# Patient Record
Sex: FEMALE | Race: WHITE | Employment: UNEMPLOYED | URBAN - METROPOLITAN AREA
[De-identification: names, ages, dates, MRNs, and addresses within clinical notes are randomized per-mention and may not be internally consistent; named-entity substitution may affect disease eponyms.]

---

## 2017-11-15 ENCOUNTER — HOSPITAL ENCOUNTER (OUTPATIENT)
Dept: GENERAL RADIOLOGY | Age: 57
Discharge: HOME | End: 2017-11-15
Payer: SUBSIDIZED

## 2017-11-15 DIAGNOSIS — M25.569 PAIN IN KNEE: ICD-10-CM

## 2017-11-15 DIAGNOSIS — M25.561 KNEE PAIN, RIGHT: ICD-10-CM

## 2017-11-15 PROCEDURE — 73562 X-RAY EXAM OF KNEE 3: CPT

## 2018-07-16 ENCOUNTER — HOSPITAL ENCOUNTER (OUTPATIENT)
Dept: LAB | Age: 58
Discharge: HOME | End: 2018-07-16

## 2018-07-16 PROCEDURE — 80053 COMPREHEN METABOLIC PANEL: CPT | Performed by: NURSE PRACTITIONER

## 2018-07-16 PROCEDURE — 80061 LIPID PANEL: CPT | Performed by: NURSE PRACTITIONER

## 2018-07-16 PROCEDURE — 85025 COMPLETE CBC W/AUTO DIFF WBC: CPT | Performed by: NURSE PRACTITIONER

## 2018-07-16 PROCEDURE — 83036 HEMOGLOBIN GLYCOSYLATED A1C: CPT | Performed by: NURSE PRACTITIONER

## 2018-07-17 LAB
ALBUMIN SERPL-MCNC: 3.3 G/DL (ref 3.5–5)
ALBUMIN/GLOB SERPL: 0.8 {RATIO} (ref 1.1–2.2)
ALP SERPL-CCNC: 82 U/L (ref 45–117)
ALT SERPL-CCNC: 38 U/L (ref 12–78)
ANION GAP SERPL CALC-SCNC: 10 MMOL/L (ref 5–15)
AST SERPL-CCNC: 60 U/L (ref 15–37)
BASOPHILS # BLD: 0 K/UL (ref 0–0.1)
BASOPHILS NFR BLD: 0 % (ref 0–1)
BILIRUB SERPL-MCNC: 0.6 MG/DL (ref 0.2–1)
BUN SERPL-MCNC: 15 MG/DL (ref 6–20)
BUN/CREAT SERPL: 15 (ref 12–20)
CALCIUM SERPL-MCNC: 9.2 MG/DL (ref 8.5–10.1)
CHLORIDE SERPL-SCNC: 103 MMOL/L (ref 97–108)
CHOLEST SERPL-MCNC: 186 MG/DL
CO2 SERPL-SCNC: 29 MMOL/L (ref 21–32)
CREAT SERPL-MCNC: 0.99 MG/DL (ref 0.55–1.02)
DIFFERENTIAL METHOD BLD: ABNORMAL
EOSINOPHIL # BLD: 0.2 K/UL (ref 0–0.4)
EOSINOPHIL NFR BLD: 3 % (ref 0–7)
ERYTHROCYTE [DISTWIDTH] IN BLOOD BY AUTOMATED COUNT: 13.5 % (ref 11.5–14.5)
EST. AVERAGE GLUCOSE BLD GHB EST-MCNC: 128 MG/DL
GLOBULIN SER CALC-MCNC: 4.1 G/DL (ref 2–4)
GLUCOSE SERPL-MCNC: 101 MG/DL (ref 65–100)
HBA1C MFR BLD: 6.1 % (ref 4.2–6.3)
HCT VFR BLD AUTO: 50.3 % (ref 35–47)
HDLC SERPL-MCNC: 37 MG/DL
HDLC SERPL: 5 {RATIO} (ref 0–5)
HGB BLD-MCNC: 15.3 G/DL (ref 11.5–16)
IMM GRANULOCYTES # BLD: 0 K/UL (ref 0–0.04)
IMM GRANULOCYTES NFR BLD AUTO: 0 % (ref 0–0.5)
LDLC SERPL CALC-MCNC: 100.4 MG/DL (ref 0–100)
LIPID PROFILE,FLP: ABNORMAL
LYMPHOCYTES # BLD: 1.7 K/UL (ref 0.8–3.5)
LYMPHOCYTES NFR BLD: 21 % (ref 12–49)
MCH RBC QN AUTO: 31.4 PG (ref 26–34)
MCHC RBC AUTO-ENTMCNC: 30.4 G/DL (ref 30–36.5)
MCV RBC AUTO: 103.1 FL (ref 80–99)
MONOCYTES # BLD: 0.4 K/UL (ref 0–1)
MONOCYTES NFR BLD: 6 % (ref 5–13)
NEUTS SEG # BLD: 5.6 K/UL (ref 1.8–8)
NEUTS SEG NFR BLD: 71 % (ref 32–75)
NRBC # BLD: 0 K/UL (ref 0–0.01)
NRBC BLD-RTO: 0 PER 100 WBC
PLATELET # BLD AUTO: 178 K/UL (ref 150–400)
POTASSIUM SERPL-SCNC: 5.1 MMOL/L (ref 3.5–5.1)
PROT SERPL-MCNC: 7.4 G/DL (ref 6.4–8.2)
RBC # BLD AUTO: 4.88 M/UL (ref 3.8–5.2)
SODIUM SERPL-SCNC: 142 MMOL/L (ref 136–145)
TRIGL SERPL-MCNC: 243 MG/DL (ref ?–150)
VLDLC SERPL CALC-MCNC: 48.6 MG/DL
WBC # BLD AUTO: 8 K/UL (ref 3.6–11)

## 2018-07-31 ENCOUNTER — HOSPITAL ENCOUNTER (OUTPATIENT)
Dept: LAB | Age: 58
Discharge: HOME | End: 2018-07-31

## 2018-07-31 PROCEDURE — 82607 VITAMIN B-12: CPT | Performed by: NURSE PRACTITIONER

## 2018-08-01 LAB
FOLATE SERPL-MCNC: 14.1 NG/ML (ref 5–21)
VIT B12 SERPL-MCNC: 294 PG/ML (ref 193–986)

## 2019-01-30 ENCOUNTER — HOSPITAL ENCOUNTER (EMERGENCY)
Age: 59
Discharge: HOME | End: 2019-01-30
Attending: EMERGENCY MEDICINE | Admitting: EMERGENCY MEDICINE
Payer: MEDICARE

## 2019-01-30 ENCOUNTER — APPOINTMENT (OUTPATIENT)
Dept: GENERAL RADIOLOGY | Age: 59
End: 2019-01-30
Attending: PHYSICIAN ASSISTANT
Payer: MEDICARE

## 2019-01-30 VITALS
WEIGHT: 293 LBS | RESPIRATION RATE: 18 BRPM | HEART RATE: 82 BPM | TEMPERATURE: 99.1 F | BODY MASS INDEX: 50.02 KG/M2 | OXYGEN SATURATION: 95 % | SYSTOLIC BLOOD PRESSURE: 155 MMHG | DIASTOLIC BLOOD PRESSURE: 82 MMHG | HEIGHT: 64 IN

## 2019-01-30 DIAGNOSIS — R03.0 ELEVATED BLOOD PRESSURE READING: ICD-10-CM

## 2019-01-30 DIAGNOSIS — S80.02XA CONTUSION OF LEFT KNEE, INITIAL ENCOUNTER: ICD-10-CM

## 2019-01-30 DIAGNOSIS — S80.212A ABRASION OF KNEE, BILATERAL: Primary | ICD-10-CM

## 2019-01-30 DIAGNOSIS — S80.211A ABRASION OF KNEE, BILATERAL: Primary | ICD-10-CM

## 2019-01-30 DIAGNOSIS — S80.01XA CONTUSION OF RIGHT KNEE, INITIAL ENCOUNTER: ICD-10-CM
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
## 2019-01-30 PROCEDURE — 73562 X-RAY EXAM OF KNEE 3: CPT

## 2019-01-30 PROCEDURE — 99284 EMERGENCY DEPT VISIT MOD MDM: CPT

## 2019-01-30 PROCEDURE — 74011250637 HC RX REV CODE- 250/637: Performed by: PHYSICIAN ASSISTANT

## 2019-01-30 RX ORDER — GUAIFENESIN 100 MG/5ML
81 LIQUID (ML) ORAL DAILY
Status: ON HOLD | COMMUNITY
End: 2020-07-27

## 2019-01-30 RX ORDER — NAPROXEN 250 MG/1
500 TABLET ORAL
Status: COMPLETED | OUTPATIENT
Start: 2019-01-30 | End: 2019-01-30

## 2019-01-30 RX ORDER — ATORVASTATIN CALCIUM 10 MG/1
10 TABLET, FILM COATED ORAL
Status: ON HOLD | COMMUNITY
End: 2022-05-17 | Stop reason: SDUPTHER

## 2019-01-30 RX ORDER — NAPROXEN 500 MG/1
500 TABLET ORAL
Qty: 20 TAB | Refills: 0 | Status: SHIPPED | OUTPATIENT
Start: 2019-01-30 | End: 2019-12-27

## 2019-01-30 RX ADMIN — NAPROXEN 500 MG: 250 TABLET ORAL at 09:45

## 2019-01-30 NOTE — DISCHARGE INSTRUCTIONS
Patient Education        Scrapes (Abrasions): Care Instructions  Your Care Instructions  Scrapes (abrasions) are wounds where your skin has been rubbed or torn off. Most scrapes do not go deep into the skin, but some may remove several layers of skin. Scrapes usually don't bleed much, but they may ooze pinkish fluid. Scrapes on the head or face may appear worse than they are. They may bleed a lot because of the good blood supply to this area. Most scrapes heal well and may not need a bandage. They usually heal within 3 to 7 days. A large, deep scrape may take 1 to 2 weeks or longer to heal. A scab may form on some scrapes. Follow-up care is a key part of your treatment and safety. Be sure to make and go to all appointments, and call your doctor if you are having problems. It's also a good idea to know your test results and keep a list of the medicines you take. How can you care for yourself at home? · If your doctor told you how to care for your wound, follow your doctor's instructions. If you did not get instructions, follow this general advice:  ? Wash the scrape with clean water 2 times a day. Don't use hydrogen peroxide or alcohol, which can slow healing. ? You may cover the scrape with a thin layer of petroleum jelly, such as Vaseline, and a nonstick bandage. ? Apply more petroleum jelly and replace the bandage as needed. · Prop up the injured area on a pillow anytime you sit or lie down during the next 3 days. Try to keep it above the level of your heart. This will help reduce swelling. · Be safe with medicines. Take pain medicines exactly as directed. ? If the doctor gave you a prescription medicine for pain, take it as prescribed. ? If you are not taking a prescription pain medicine, ask your doctor if you can take an over-the-counter medicine. When should you call for help?   Call your doctor now or seek immediate medical care if:    · You have signs of infection, such as:  ? Increased pain, swelling, warmth, or redness around the scrape. ? Red streaks leading from the scrape. ? Pus draining from the scrape. ? A fever.     · The scrape starts to bleed, and blood soaks through the bandage. Oozing small amounts of blood is normal.    Watch closely for changes in your health, and be sure to contact your doctor if the scrape is not getting better each day. Where can you learn more? Go to http://trina-jordan.info/. Enter A374 in the search box to learn more about \"Scrapes (Abrasions): Care Instructions. \"  Current as of: September 23, 2018  Content Version: 11.9  © 9944-2286 [employer]. Care instructions adapted under license by [employer] (which disclaims liability or warranty for this information). If you have questions about a medical condition or this instruction, always ask your healthcare professional. [place] [address] any warranty or liability for your use of this information. Patient Education        Bruises: Care Instructions  Your Care Instructions    Bruises occur when small blood vessels under the skin tear or rupture, most often from a twist, bump, or fall. Blood leaks into tissues under the skin and causes a black-and-blue spot that often turns colors, including purplish black, reddish blue, or yellowish green, as the bruise heals. Bruises hurt, but most are not serious and will go away on their own within 2 to 4 weeks. Sometimes, gravity causes them to spread down the body. A leg bruise usually will take longer to heal than a bruise on the face or arms. Follow-up care is a key part of your treatment and safety. Be sure to make and go to all appointments, and call your doctor if you are having problems. It's also a good idea to know your test results and keep a list of the medicines you take. How can you care for yourself at home? · Take pain medicines exactly as directed.   ? If the doctor gave you a prescription medicine for pain, take it as prescribed. ? If you are not taking a prescription pain medicine, ask your doctor if you can take an over-the-counter medicine. · Put ice or a cold pack on the area for 10 to 20 minutes at a time. Put a thin cloth between the ice and your skin. · If you can, prop up the bruised area on pillows as much as possible for the next few days. Try to keep the bruise above the level of your heart. When should you call for help? Call your doctor now or seek immediate medical care if:    · You have signs of infection, such as:  ? Increased pain, swelling, warmth, or redness. ? Red streaks leading from the bruise. ? Pus draining from the bruise. ? A fever.     · You have a bruise on your leg and signs of a blood clot, such as:  ? Increasing redness and swelling along with warmth, tenderness, and pain in the bruised area. ? Pain in your calf, back of the knee, thigh, or groin. ? Redness and swelling in your leg or groin.     · Your pain gets worse.    Watch closely for changes in your health, and be sure to contact your doctor if:    · You do not get better as expected. Where can you learn more? Go to http://trina-jordan.info/. Enter [phone] in the search box to learn more about \"Bruises: Care Instructions. \"  Current as of: September 23, 2018  Content Version: 11.9  © 2848-6009 [employer]. Care instructions adapted under license by [employer] (which disclaims liability or warranty for this information). If you have questions about a medical condition or this instruction, always ask your healthcare professional. [place] [address] any warranty or liability for your use of this information. Patient Education        Knee Pain or Injury: Care Instructions  Your Care Instructions    Injuries are a common cause of knee problems. Sudden (acute) injuries may be caused by a direct blow to the knee.  They can also be caused by abnormal twisting, bending, or falling on the knee. Pain, bruising, or swelling may be severe, and may start within minutes of the injury. Overuse is another cause of knee pain. Other causes are climbing stairs, kneeling, and other activities that use the knee. Everyday wear and tear, especially as you get older, also can cause knee pain. Rest, along with home treatment, often relieves pain and allows your knee to heal. If you have a serious knee injury, you may need tests and treatment. Follow-up care is a key part of your treatment and safety. Be sure to make and go to all appointments, and call your doctor if you are having problems. It's also a good idea to know your test results and keep a list of the medicines you take. How can you care for yourself at home? · Be safe with medicines. Read and follow all instructions on the label. ? If the doctor gave you a prescription medicine for pain, take it as prescribed. ? If you are not taking a prescription pain medicine, ask your doctor if you can take an over-the-counter medicine. · Rest and protect your knee. Take a break from any activity that may cause pain. · Put ice or a cold pack on your knee for 10 to 20 minutes at a time. Put a thin cloth between the ice and your skin. · Prop up a sore knee on a pillow when you ice it or anytime you sit or lie down for the next 3 days. Try to keep it above the level of your heart. This will help reduce swelling. · If your knee is not swollen, you can put moist heat, a heating pad, or a warm cloth on your knee. · If your doctor recommends an elastic bandage, sleeve, or other type of support for your knee, wear it as directed. · Follow your doctor's instructions about how much weight you can put on your leg. Use a cane, crutches, or a walker as instructed. · Follow your doctor's instructions about activity during your healing process. If you can do mild exercise, slowly increase your activity.   · Reach and stay at a healthy weight. Extra weight can strain the joints, especially the knees and hips, and make the pain worse. Losing even a few pounds may help. When should you call for help? Call 911 anytime you think you may need emergency care. For example, call if:    · You have symptoms of a blood clot in your lung (called a pulmonary embolism). These may include:  ? Sudden chest pain. ? Trouble breathing. ? Coughing up blood.    Call your doctor now or seek immediate medical care if:    · You have severe or increasing pain.     · Your leg or foot turns cold or changes color.     · You cannot stand or put weight on your knee.     · Your knee looks twisted or bent out of shape.     · You cannot move your knee.     · You have signs of infection, such as:  ? Increased pain, swelling, warmth, or redness. ? Red streaks leading from the knee. ? Pus draining from a place on your knee. ? A fever.     · You have signs of a blood clot in your leg (called a deep vein thrombosis), such as:  ? Pain in your calf, back of the knee, thigh, or groin. ? Redness and swelling in your leg or groin.    Watch closely for changes in your health, and be sure to contact your doctor if:    · You have tingling, weakness, or numbness in your knee.     · You have any new symptoms, such as swelling.     · You have bruises from a knee injury that last longer than 2 weeks.     · You do not get better as expected. Where can you learn more? Go to http://trina-jordan.info/. Enter K195 in the search box to learn more about \"Knee Pain or Injury: Care Instructions. \"  Current as of: September 23, 2018  Content Version: 11.9  © 6933-1483 [employer]. Care instructions adapted under license by [employer] (which disclaims liability or warranty for this information).  If you have questions about a medical condition or this instruction, always ask your healthcare professional. [place] any warranty or liability for your use of this information. Patient Education        Elevated Blood Pressure: Care Instructions  Your Care Instructions    Blood pressure is a measure of how hard the blood pushes against the walls of your arteries. It's normal for blood pressure to go up and down throughout the day. But if it stays up over time, you have high blood pressure. Two numbers tell you your blood pressure. The first number is the systolic pressure. It shows how hard the blood pushes when your heart is pumping. The second number is the diastolic pressure. It shows how hard the blood pushes between heartbeats, when your heart is relaxed and filling with blood. An ideal blood pressure in adults is less than 120/80 (say \"120 over 80\"). High blood pressure is 140/90 or higher. You have high blood pressure if your top number is 140 or higher or your bottom number is 90 or higher, or both. The main test for high blood pressure is simple, fast, and painless. To diagnose high blood pressure, your doctor will test your blood pressure at different times. After testing your blood pressure, your doctor may ask you to test it again when you are home. If you are diagnosed with high blood pressure, you can work with your doctor to make a long-term plan to manage it. Follow-up care is a key part of your treatment and safety. Be sure to make and go to all appointments, and call your doctor if you are having problems. It's also a good idea to know your test results and keep a list of the medicines you take. How can you care for yourself at home? · Do not smoke. Smoking increases your risk for heart attack and stroke. If you need help quitting, talk to your doctor about stop-smoking programs and medicines. These can increase your chances of quitting for good. · Stay at a healthy weight. · Try to limit how much sodium you eat to less than 2,300 milligrams (mg) a day.  Your doctor may ask you to try to eat less than 1,500 mg a day. · Be physically active. Get at least 30 minutes of exercise on most days of the week. Walking is a good choice. You also may want to do other activities, such as running, swimming, cycling, or playing tennis or team sports. · Avoid or limit alcohol. Talk to your doctor about whether you can drink any alcohol. · Eat plenty of fruits, vegetables, and low-fat dairy products. Eat less saturated and total fats. · Learn how to check your blood pressure at home. When should you call for help? Call your doctor now or seek immediate medical care if:  ? · Your blood pressure is much higher than normal (such as 180/110 or higher). ? · You think high blood pressure is causing symptoms such as:  ¨ Severe headache. ¨ Blurry vision. ? Watch closely for changes in your health, and be sure to contact your doctor if:  ? · You do not get better as expected. Where can you learn more? Go to http://trina-jordan.info/. Enter G255 in the search box to learn more about \"Elevated Blood Pressure: Care Instructions. \"  Current as of: September 21, 2016  Content Version: 11.4  © 6004-9907 [employer]. Care instructions adapted under license by [employer] (which disclaims liability or warranty for this information). If you have questions about a medical condition or this instruction, always ask your healthcare professional. [address] any warranty or liability for your use of this information.

## 2019-01-30 NOTE — ED PROVIDER NOTES
EMERGENCY DEPARTMENT HISTORY AND PHYSICAL EXAM 
 
 
Date: 1/30/2019 Patient Name: Ishan Lizarraga History of Presenting Illness Chief Complaint Patient presents with  Knee Pain  
  pt arrives by EMS with reports of bilateral knee pain, left worse than right, reports she fell this morning, reports she fell when toilet seat broke  Fall History Provided By: Patient HPI: Ishan Lizarraga, 60 y.o. female with PMHx significant for DM, HTN, high cholesterol, presents by EMS to the ED with cc of new onset moderate bilateral knee pain status post falling when a a toilet seat broke. The injury occurred this morning just prior to arrival in the ED. She notes having chronic L knee pain and that she is scheduled for an orthopedics appointment at [facility] tomorrow. Pt states that her pain is exacerbated with movement and palpation. She denies any head trauma or LOC during incident. Pt specifically denies any HA, SOB, CP, abdominal pain, nausea, vomiting, fevers, chills, cough, urinary sxs or diarrhea. There was no treatment PTA to the ED. There are no other complaints, changes, or physical findings at this time. Social Hx: Tobacco (-), EtOH (-), Illicit drug use (-) PCP: Mily Cadena NP Ortho: [address] No current facility-administered medications on file prior to encounter. Current Outpatient Medications on File Prior to Encounter Medication Sig Dispense Refill  aspirin 81 mg chewable tablet Take 81 mg by mouth daily.  atorvastatin (LIPITOR) 10 mg tablet Take  by mouth daily.  furosemide (LASIX) 20 mg tablet Take 20 mg by mouth daily.  metoprolol (LOPRESSOR) 25 mg tablet Take 25 mg by mouth two (2) times a day. Past History Past Medical History: 
Past Medical History:  
Diagnosis Date  Diabetes ([facility] [address]  Hypertension  Ill-defined condition   
 high cholesterol  Ill-defined condition   
 tachycardia  Other and unspecified symptoms and signs involving general sensations and perceptions   
 ruptured disc Past Surgical History: 
History reviewed. No pertinent surgical history. Family History: 
History reviewed. No pertinent family history. Social History: 
Social History Tobacco Use  Smoking status: Former Smoker Substance Use Topics  Alcohol use: No  
 Drug use: No  
 
 
Allergies: Allergies Allergen Reactions  Other Plant, Animal, Environmental Rash Ivory soap caused rash on hands. Review of Systems Review of Systems Constitutional: Negative for chills, diaphoresis and fever. HENT: Negative for congestion, ear pain, rhinorrhea and sore throat. Respiratory: Negative for cough and shortness of breath. Cardiovascular: Negative for chest pain. Gastrointestinal: Negative for abdominal pain, constipation, diarrhea, nausea and vomiting. Genitourinary: Negative for difficulty urinating, dysuria, frequency and hematuria. Musculoskeletal: Positive for arthralgias (BL knee). Negative for myalgias. Neurological: Negative for headaches. All other systems reviewed and are negative. Physical Exam  
Physical Exam  
Constitutional: She is oriented to person, place, and time. She appears well-developed and well-nourished. No distress. Obese 60 y.o.  female HENT:  
Head: Normocephalic and atraumatic. Eyes: Conjunctivae are normal. Right eye exhibits no discharge. Left eye exhibits no discharge. Neck: Normal range of motion. Neck supple. Cardiovascular: Normal rate, regular rhythm, normal heart sounds and intact distal pulses. No murmur heard. Pulmonary/Chest: Effort normal and breath sounds normal. No respiratory distress. Musculoskeletal:  
Bilateral knees with superficial abrasions present. Mild, diffuse tenderness to palpation of bilateral knees without point tenderness.  FROM without crepitus or laxity. Distal NV intact. Cap refill < 2 sec. Neurological: She is alert and oriented to person, place, and time. Skin: Skin is warm and dry. She is not diaphoretic. Psychiatric: She has a normal mood and affect. Her behavior is normal.  
Nursing note and vitals reviewed. Diagnostic Study Results Labs - None Radiologic Studies -  
XR KNEE RT 3 V Final Result IMPRESSION: Small effusion. Mild degenerative changes. No acute osseous  
abnormality. XR KNEE LT 3 V Final Result IMPRESSION: Small joint effusion. Significant degenerative changes. No acute  
osseous abnormality. Medical Decision Making I am the first provider for this patient. I reviewed the vital signs, available nursing notes, past medical history, past surgical history, family history and social history. Vital Signs-Reviewed the patient's vital signs. Patient Vitals for the past 12 hrs: 
 Temp Pulse Resp BP SpO2  
01/30/19 0913 99.1 °F (37.3 °C) 85 18 (!) 151/120 95 % Records Reviewed: Nursing Notes Provider Notes (Medical Decision Making): DDx: fracture, sprain, strain, contusion, abrasion, DJD 
 
ED Course:  
Initial assessment performed. The patients presenting problems have been discussed, and they are in agreement with the care plan formulated and outlined with them. I have encouraged them to ask questions as they arise throughout their visit. 10:27 AM 
Discussed with patient at length the need for blood pressure re-evaluation and management with primary care. Discussed the long term sequelae for elevated blood pressure including blindness, CVA, MI, and renal failure/ dialysis. Pt agrees to follow up as directed. [place], [place] Critical Care Time: 0 Disposition: 
DISCHARGE NOTE: 
10:28 AM 
The pt is ready for discharge.  The pt's signs, symptoms, diagnosis, and discharge instructions have been discussed and pt has conveyed their understanding. The pt is to follow up as recommended or return to ER should their symptoms worsen. Plan has been discussed and pt is in agreement. PLAN: 
1. Current Discharge Medication List  
  
START taking these medications Details  
naproxen (NAPROSYN) 500 mg tablet Take 1 Tab by mouth every twelve (12) hours as needed for Pain. Qty: 20 Tab, Refills: 0 CONTINUE these medications which have NOT CHANGED Details  
aspirin 81 mg chewable tablet Take 81 mg by mouth daily. atorvastatin (LIPITOR) 10 mg tablet Take  by mouth daily. furosemide (LASIX) 20 mg tablet Take 20 mg by mouth daily. metoprolol (LOPRESSOR) 25 mg tablet Take 25 mg by mouth two (2) times a day. 2.  
Follow-up Information Follow up With Specialties Details Why Contact Info Acacia Guillory NP Nurse Practitioner  As needed [address] [place] [facility] 
[phone] [address]  In 1 day as scheduled tomorrow Jagdish Avalos [address] [id number] [phone] Return to ED if worse Diagnosis Clinical Impression: 1. Abrasion of knee, bilateral   
2. Contusion of left knee, initial encounter 3. Contusion of right knee, initial encounter 4. Fall, initial encounter 5. Elevated blood pressure reading 8:06 AM 
I was personally available for consultation in the emergency department. I have reviewed the chart and agree with the documentation recorded by the [facility], including the assessment, treatment plan, and disposition. Caio Amos MD 
 
 
Attestations: This note is prepared by Yaya Urias, acting as a Scribe for Damon Payne PA-C. Damon Payne PA-C: The scribe's documentation has been prepared under my direction and personally reviewed by me in its entirety. I confirm that the notes above accurately reflects all work, treatment, procedures, and medical decision making performed by me. This note will not be viewable in [address]

## 2019-12-27 ENCOUNTER — HOSPITAL ENCOUNTER (EMERGENCY)
Age: 59
Discharge: HOME | End: 2019-12-27
Attending: EMERGENCY MEDICINE
Payer: MEDICARE

## 2019-12-27 VITALS
SYSTOLIC BLOOD PRESSURE: 125 MMHG | WEIGHT: 293 LBS | OXYGEN SATURATION: 99 % | HEIGHT: 63 IN | TEMPERATURE: 97.8 F | RESPIRATION RATE: 18 BRPM | HEART RATE: 90 BPM | BODY MASS INDEX: 51.91 KG/M2 | DIASTOLIC BLOOD PRESSURE: 60 MMHG

## 2019-12-27 DIAGNOSIS — R04.0 EPISTAXIS: Primary | ICD-10-CM

## 2019-12-27 PROCEDURE — 75810000284 HC CNTRL NASAL HEMORHRAGE SIMPLE

## 2019-12-27 PROCEDURE — 99284 EMERGENCY DEPT VISIT MOD MDM: CPT

## 2019-12-27 RX ORDER — LIDOCAINE HYDROCHLORIDE 20 MG/ML
10 SOLUTION OROPHARYNGEAL
Status: DISCONTINUED | OUTPATIENT
Start: 2019-12-27 | End: 2019-12-27

## 2019-12-27 RX ORDER — OXYMETAZOLINE HCL 0.05 %
2 SPRAY, NON-AEROSOL (ML) NASAL
Status: DISCONTINUED | OUTPATIENT
Start: 2019-12-27 | End: 2019-12-27

## 2019-12-28 NOTE — DISCHARGE INSTRUCTIONS
Patient Education        Nosebleeds: Care Instructions  Your Care Instructions    Nosebleeds are common, especially if you have colds or allergies. Many things can cause a nosebleed. Some nosebleeds stop on their own with pressure. Others need packing. Some get cauterized (sealed). If you have gauze or other packing materials in your nose, you will need to follow up with your doctor to have the packing removed. You may need more treatment if you get nosebleeds a lot. The doctor has checked you carefully, but problems can develop later. If you notice any problems or new symptoms, get medical treatment right away. Follow-up care is a key part of your treatment and safety. Be sure to make and go to all appointments, and call your doctor if you are having problems. It's also a good idea to know your test results and keep a list of the medicines you take. How can you care for yourself at home? · If you get another nosebleed:  ? Sit up and tilt your head slightly forward. This keeps blood from going down your throat. ? Use your thumb and index finger to pinch your nose shut for 10 minutes. Use a clock. Do not check to see if the bleeding has stopped before the 10 minutes are up. If the bleeding has not stopped, pinch your nose shut for another 10 minutes. ? When the bleeding has stopped, try not to pick, rub, or blow your nose for 12 hours. Avoiding these things helps keep your nose from bleeding again. · If your doctor prescribed antibiotics, take them as directed. Do not stop taking them just because you feel better. You need to take the full course of antibiotics. To prevent nosebleeds  · Do not blow your nose too hard. · Try not to lift or strain after a nosebleed. · Raise your head on a pillow while you sleep. · Put a thin layer of a saline- or water-based nasal gel, such as NasoGel, inside your nose. Put it on the septum, which divides your nostrils.  This will prevent dryness that can cause nosebleeds. · Use a vaporizer or humidifier to add moisture to your bedroom. Follow the directions for cleaning the machine. · Do not use aspirin, ibuprofen (Advil, Motrin), or naproxen (Aleve) for 36 to 48 hours after a nosebleed unless your doctor tells you to. You can use acetaminophen (Tylenol) for pain relief. · Talk to your doctor about stopping any other medicines you are taking. Some medicines may make you more likely to get a nosebleed. · Do not use cold medicines or nasal sprays without first talking to your doctor. They can make your nose dry. When should you call for help? Call 911 anytime you think you may need emergency care. For example, call if:    · You passed out (lost consciousness).    Call your doctor now or seek immediate medical care if:    · You get another nosebleed and your nose is still bleeding after you have applied pressure 3 times for 10 minutes each time (30 minutes total).     · There is a lot of blood running down the back of your throat even after you pinch your nose and tilt your head forward.     · You have a fever.     · You have sinus pain.    Watch closely for changes in your health, and be sure to contact your doctor if:    · You get nosebleeds often, even if they stop.     · You do not get better as expected. Where can you learn more? Go to http://trina-jordan.info/. Enter S156 in the search box to learn more about \"Nosebleeds: Care Instructions. \"  Current as of: June 26, 2019  Content Version: 12.2  © 2124-8170 [employer]. Care instructions adapted under license by [employer] (which disclaims liability or warranty for this information). If you have questions about a medical condition or this instruction, always ask your healthcare professional. [address] any warranty or liability for your use of this information.

## 2019-12-28 NOTE — ED PROVIDER NOTES
EMERGENCY DEPARTMENT HISTORY AND PHYSICAL EXAM      Date: 12/27/2019  Patient Name: Marcus Patel    History of Presenting Illness     Chief Complaint   Patient presents with    Epistaxis     pt arrived with EMS, having nosebleed x 20 mins       History Provided By: Patient    HPI: Marcus Patel, 60 y.o. female presents by EMS to the ED with cc of a nose bleed that started bleeding this evening. Notes that it is her left nare that is bleeding. She denies recent nasal trauma. She notes she has had recurring nose bleeds over that past month. She denies pain. There are no other complaints, changes, or physical findings at this time. Social Hx: Tobacco (denies), EtOH (denies), Illicit drug use (denies)     PCP: José Antonio Mistry NP    No current facility-administered medications on file prior to encounter. Current Outpatient Medications on File Prior to Encounter   Medication Sig Dispense Refill    aspirin 81 mg chewable tablet Take 81 mg by mouth daily.  atorvastatin (LIPITOR) 10 mg tablet Take  by mouth daily.  metoprolol (LOPRESSOR) 25 mg tablet Take 25 mg by mouth two (2) times a day.  [DISCONTINUED] naproxen (NAPROSYN) 500 mg tablet Take 1 Tab by mouth every twelve (12) hours as needed for Pain. 20 Tab 0    furosemide (LASIX) 20 mg tablet Take 20 mg by mouth daily. Past History     Past Medical History:  Past Medical History:   Diagnosis Date    Diabetes ([address]     Hypertension     Ill-defined condition     high cholesterol    Ill-defined condition     tachycardia    Other and unspecified symptoms and signs involving general sensations and perceptions     ruptured disc       Past Surgical History:  No past surgical history on file. Family History:  No family history on file. Social History:  Social History     Tobacco Use    Smoking status: Former Smoker   Substance Use Topics    Alcohol use: No    Drug use: No       Allergies:   Allergies   Allergen Reactions    Other Plant, Animal, Environmental Rash     Ivory soap caused rash on hands. Review of Systems   Review of Systems   Constitutional: Negative for chills, diaphoresis and fever. HENT: Positive for nosebleeds. Negative for congestion, ear pain, rhinorrhea and sore throat. Respiratory: Negative for cough and shortness of breath. Cardiovascular: Negative for chest pain. Gastrointestinal: Negative for abdominal pain, constipation, diarrhea, nausea and vomiting. Genitourinary: Negative for difficulty urinating, dysuria, frequency and hematuria. Musculoskeletal: Negative for arthralgias and myalgias. Neurological: Negative for headaches. All other systems reviewed and are negative. Physical Exam   Physical Exam  Vitals signs and nursing note reviewed. Constitutional:       General: She is not in acute distress. Appearance: She is well-developed. She is obese. She is not diaphoretic. Comments: 60 y.o.  female with elevated BMI   HENT:      Head: Normocephalic and atraumatic. Eyes:      General:         Right eye: No discharge. Left eye: No discharge. Conjunctiva/sclera: Conjunctivae normal.   Neck:      Musculoskeletal: Normal range of motion and neck supple. Cardiovascular:      Rate and Rhythm: Normal rate and regular rhythm. Heart sounds: Normal heart sounds. No murmur. Pulmonary:      Effort: Pulmonary effort is normal. No respiratory distress. Breath sounds: Normal breath sounds. Skin:     General: Skin is warm and dry. Neurological:      Mental Status: She is alert and oriented to person, place, and time. Psychiatric:         Behavior: Behavior normal.         Diagnostic Study Results     Labs - none    Radiologic Studies - none    Medical Decision Making   I am the first provider for this patient.     I reviewed the vital signs, available nursing notes, past medical history, past surgical history, family history and social history. Vital Signs-Reviewed the patient's vital signs. Patient Vitals for the past 12 hrs:   Temp Pulse Resp BP SpO2   12/27/19 2129  90 18 125/60 99 %   12/27/19 2034 97.8 °F (36.6 °C) 84 20 141/76 96 %     Records Reviewed: Nursing Notes and Old Medical Records    Provider Notes (Medical Decision Making):   Recurring nosebleeds without other complaint, pain, injury, or medical symptom. Will pack nose and have patient follow-up with ENT or PCP. ED Course:   Initial assessment performed. The patients presenting problems have been discussed, and they are in agreement with the care plan formulated and outlined with them. I have encouraged them to ask questions as they arise throughout their visit. Procedure Note - Epistaxis Management:   9:05 PM  Performed by: COREEN Nguyen . Complexity: Simple  The patient underwent nasal pack placement with RhinoRocket in the left nare(s). Hemostasis was achieved after placement. Estimated blood loss: < 5 mL  The procedure took 1-15 minutes, and pt tolerated well.     9:29 PM  The patient has been re-evaluated. She is no longer bleeding. Will leave merocel in place and have patient follow up with PCP or ENT on Monday for removal.          Critical Care Time: None    Disposition:  DISCHARGE NOTE:  9:29 PM  The pt is ready for discharge. The pt's signs, symptoms, diagnosis, and discharge instructions have been discussed and pt has conveyed their understanding. The pt is to follow up as recommended or return to ER should their symptoms worsen. Plan has been discussed and pt is in agreement. PLAN:  1. Current Discharge Medication List        2.    Follow-up Information     Follow up With Specialties Details Why Contact Info    Michi Suarez NP Nurse Practitioner In 3 days  [address] [facility]      Priscilla Murray MD Otolaryngology In 3 days  [address]  [facility]  [phone] Return to ED if worse     Diagnosis     Clinical Impression:   1. Epistaxis          Please note that this dictation was completed with Yasuu, the computer voice recognition software. Quite often unanticipated grammatical, syntax, homophones, and other interpretive errors are inadvertently transcribed by the computer software. Please disregards these errors. Please excuse any errors that have escaped final proofreading. This note will not be viewable in [address]

## 2020-07-23 ENCOUNTER — HOSPITAL ENCOUNTER (INPATIENT)
Age: 60
LOS: 14 days | Discharge: NURSING, REHAB OR LONG-TERM CARE | DRG: 207 | End: 2020-08-07
Attending: EMERGENCY MEDICINE | Admitting: INTERNAL MEDICINE
Payer: MEDICARE

## 2020-07-23 DIAGNOSIS — J96.01 ACUTE RESPIRATORY FAILURE WITH HYPOXIA (HCC): Primary | ICD-10-CM

## 2020-07-23 DIAGNOSIS — I50.33 ACUTE ON CHRONIC DIASTOLIC CHF (CONGESTIVE HEART FAILURE) (HCC): ICD-10-CM

## 2020-07-23 DIAGNOSIS — Z20.822 SUSPECTED COVID-19 VIRUS INFECTION: ICD-10-CM

## 2020-07-23 DIAGNOSIS — R06.03 RESPIRATORY DISTRESS: ICD-10-CM

## 2020-07-23 DIAGNOSIS — J81.0 ACUTE PULMONARY EDEMA (HCC): ICD-10-CM

## 2020-07-23 DIAGNOSIS — J18.9 ATYPICAL PNEUMONIA: ICD-10-CM

## 2020-07-23 PROCEDURE — 93005 ELECTROCARDIOGRAM TRACING: CPT

## 2020-07-23 PROCEDURE — 99285 EMERGENCY DEPT VISIT HI MDM: CPT

## 2020-07-24 ENCOUNTER — APPOINTMENT (OUTPATIENT)
Dept: GENERAL RADIOLOGY | Age: 60
DRG: 207 | End: 2020-07-24
Attending: ANESTHESIOLOGY
Payer: MEDICARE

## 2020-07-24 ENCOUNTER — APPOINTMENT (OUTPATIENT)
Dept: GENERAL RADIOLOGY | Age: 60
DRG: 207 | End: 2020-07-24
Attending: EMERGENCY MEDICINE
Payer: MEDICARE

## 2020-07-24 PROBLEM — Z20.822 COVID-19 RULED OUT: Status: ACTIVE | Noted: 2020-07-24

## 2020-07-24 LAB
ALBUMIN SERPL-MCNC: 3.1 G/DL (ref 3.5–5)
ALBUMIN/GLOB SERPL: 0.6 {RATIO} (ref 1.1–2.2)
ALP SERPL-CCNC: 88 U/L (ref 45–117)
ALT SERPL-CCNC: 25 U/L (ref 12–78)
ANION GAP SERPL CALC-SCNC: 2 MMOL/L (ref 5–15)
ARTERIAL PATENCY WRIST A: YES
ARTERIAL PATENCY WRIST A: YES
AST SERPL-CCNC: 25 U/L (ref 15–37)
B PERT DNA SPEC QL NAA+PROBE: NOT DETECTED
BASOPHILS # BLD: 0 K/UL (ref 0–0.1)
BASOPHILS NFR BLD: 0 % (ref 0–1)
BDY SITE: ABNORMAL
BDY SITE: ABNORMAL
BILIRUB SERPL-MCNC: 0.7 MG/DL (ref 0.2–1)
BNP SERPL-MCNC: 2987 PG/ML
BORDETELLA PARAPERTUSSIS PCR, BORPAR: NOT DETECTED
BUN SERPL-MCNC: 25 MG/DL (ref 6–20)
BUN/CREAT SERPL: 25 (ref 12–20)
C PNEUM DNA SPEC QL NAA+PROBE: NOT DETECTED
CA-I BLD-SCNC: 1.26 MMOL/L (ref 1.12–1.32)
CA-I BLD-SCNC: 1.34 MMOL/L (ref 1.12–1.32)
CALCIUM SERPL-MCNC: 8.8 MG/DL (ref 8.5–10.1)
CHLORIDE SERPL-SCNC: 103 MMOL/L (ref 97–108)
CK SERPL-CCNC: 39 U/L (ref 26–192)
CO2 SERPL-SCNC: 36 MMOL/L (ref 21–32)
CREAT SERPL-MCNC: 0.99 MG/DL (ref 0.55–1.02)
D DIMER PPP FEU-MCNC: 1.58 MG/L FEU (ref 0–0.65)
DIFFERENTIAL METHOD BLD: ABNORMAL
EOSINOPHIL # BLD: 0.2 K/UL (ref 0–0.4)
EOSINOPHIL NFR BLD: 1 % (ref 0–7)
ERYTHROCYTE [DISTWIDTH] IN BLOOD BY AUTOMATED COUNT: 15.2 % (ref 11.5–14.5)
FERRITIN SERPL-MCNC: 44 NG/ML (ref 26–388)
FIBRINOGEN PPP-MCNC: 414 MG/DL (ref 200–475)
FLUAV H1 2009 PAND RNA SPEC QL NAA+PROBE: NOT DETECTED
FLUAV H1 RNA SPEC QL NAA+PROBE: NOT DETECTED
FLUAV H3 RNA SPEC QL NAA+PROBE: NOT DETECTED
FLUAV SUBTYP SPEC NAA+PROBE: NOT DETECTED
FLUBV RNA SPEC QL NAA+PROBE: NOT DETECTED
GAS FLOW.O2 O2 DELIVERY SYS: ABNORMAL L/MIN
GAS FLOW.O2 O2 DELIVERY SYS: ABNORMAL L/MIN
GAS FLOW.O2 SETTING OXYMISER: 15 L/M
GLOBULIN SER CALC-MCNC: 4.8 G/DL (ref 2–4)
GLUCOSE BLD STRIP.AUTO-MCNC: 144 MG/DL (ref 65–100)
GLUCOSE SERPL-MCNC: 145 MG/DL (ref 65–100)
HADV DNA SPEC QL NAA+PROBE: NOT DETECTED
HCOV 229E RNA SPEC QL NAA+PROBE: NOT DETECTED
HCOV HKU1 RNA SPEC QL NAA+PROBE: NOT DETECTED
HCOV NL63 RNA SPEC QL NAA+PROBE: NOT DETECTED
HCOV OC43 RNA SPEC QL NAA+PROBE: NOT DETECTED
HCT VFR BLD AUTO: 51.3 % (ref 35–47)
HGB BLD-MCNC: 15 G/DL (ref 11.5–16)
HMPV RNA SPEC QL NAA+PROBE: NOT DETECTED
HPIV1 RNA SPEC QL NAA+PROBE: NOT DETECTED
HPIV2 RNA SPEC QL NAA+PROBE: NOT DETECTED
HPIV3 RNA SPEC QL NAA+PROBE: NOT DETECTED
HPIV4 RNA SPEC QL NAA+PROBE: NOT DETECTED
IMM GRANULOCYTES # BLD AUTO: 0.1 K/UL (ref 0–0.04)
IMM GRANULOCYTES NFR BLD AUTO: 1 % (ref 0–0.5)
LACTATE SERPL-SCNC: 1.5 MMOL/L (ref 0.4–2)
LDH SERPL L TO P-CCNC: 269 U/L (ref 81–246)
LYMPHOCYTES # BLD: 1.7 K/UL (ref 0.8–3.5)
LYMPHOCYTES NFR BLD: 14 % (ref 12–49)
M PNEUMO DNA SPEC QL NAA+PROBE: NOT DETECTED
MAGNESIUM SERPL-MCNC: 2.7 MG/DL (ref 1.6–2.4)
MCH RBC QN AUTO: 31.4 PG (ref 26–34)
MCHC RBC AUTO-ENTMCNC: 29.2 G/DL (ref 30–36.5)
MCV RBC AUTO: 107.5 FL (ref 80–99)
MONOCYTES # BLD: 0.8 K/UL (ref 0–1)
MONOCYTES NFR BLD: 6 % (ref 5–13)
NEUTS SEG # BLD: 9.3 K/UL (ref 1.8–8)
NEUTS SEG NFR BLD: 78 % (ref 32–75)
NRBC # BLD: 0 K/UL (ref 0–0.01)
NRBC BLD-RTO: 0 PER 100 WBC
O2/TOTAL GAS SETTING VFR VENT: 60 %
PCO2 BLD: >90 MMHG (ref 35–45)
PCO2 BLD: >90 MMHG (ref 35–45)
PEEP RESPIRATORY: 5 CMH2O
PH BLD: 7.17 [PH] (ref 7.35–7.45)
PH BLD: 7.2 [PH] (ref 7.35–7.45)
PIP ISTAT,IPIP: 26
PLATELET # BLD AUTO: 210 K/UL (ref 150–400)
PMV BLD AUTO: 12.6 FL (ref 8.9–12.9)
PO2 BLD: 83 MMHG (ref 80–100)
PO2 BLD: 88 MMHG (ref 80–100)
POTASSIUM SERPL-SCNC: 5.1 MMOL/L (ref 3.5–5.1)
PROCALCITONIN SERPL-MCNC: <0.05 NG/ML
PROT SERPL-MCNC: 7.9 G/DL (ref 6.4–8.2)
RBC # BLD AUTO: 4.77 M/UL (ref 3.8–5.2)
RSV RNA SPEC QL NAA+PROBE: NOT DETECTED
RV+EV RNA SPEC QL NAA+PROBE: NOT DETECTED
SARS-COV-2, COV2: NOT DETECTED
SERVICE CMNT-IMP: ABNORMAL
SODIUM SERPL-SCNC: 141 MMOL/L (ref 136–145)
SOURCE, COVRS: NORMAL
SPECIMEN SOURCE, FCOV2M: NORMAL
SPECIMEN TYPE: ABNORMAL
SPECIMEN TYPE: ABNORMAL
TOTAL RESP. RATE, ITRR: 14
TOTAL RESP. RATE, ITRR: 16
TROPONIN I SERPL-MCNC: <0.05 NG/ML
WBC # BLD AUTO: 12 K/UL (ref 3.6–11)

## 2020-07-24 PROCEDURE — 85025 COMPLETE CBC W/AUTO DIFF WBC: CPT

## 2020-07-24 PROCEDURE — 36556 INSERT NON-TUNNEL CV CATH: CPT

## 2020-07-24 PROCEDURE — 85384 FIBRINOGEN ACTIVITY: CPT

## 2020-07-24 PROCEDURE — 74011250636 HC RX REV CODE- 250/636

## 2020-07-24 PROCEDURE — 65610000006 HC RM INTENSIVE CARE

## 2020-07-24 PROCEDURE — 80053 COMPREHEN METABOLIC PANEL: CPT

## 2020-07-24 PROCEDURE — 82962 GLUCOSE BLOOD TEST: CPT

## 2020-07-24 PROCEDURE — 83880 ASSAY OF NATRIURETIC PEPTIDE: CPT

## 2020-07-24 PROCEDURE — 82550 ASSAY OF CK (CPK): CPT

## 2020-07-24 PROCEDURE — 5A1955Z RESPIRATORY VENTILATION, GREATER THAN 96 CONSECUTIVE HOURS: ICD-10-PCS | Performed by: ANESTHESIOLOGY

## 2020-07-24 PROCEDURE — 82728 ASSAY OF FERRITIN: CPT

## 2020-07-24 PROCEDURE — 74011250636 HC RX REV CODE- 250/636: Performed by: EMERGENCY MEDICINE

## 2020-07-24 PROCEDURE — 94660 CPAP INITIATION&MGMT: CPT

## 2020-07-24 PROCEDURE — 77030008683 HC TU ET CUF COVD -A

## 2020-07-24 PROCEDURE — 71045 X-RAY EXAM CHEST 1 VIEW: CPT

## 2020-07-24 PROCEDURE — 02HV33Z INSERTION OF INFUSION DEVICE INTO SUPERIOR VENA CAVA, PERCUTANEOUS APPROACH: ICD-10-PCS | Performed by: ANESTHESIOLOGY

## 2020-07-24 PROCEDURE — 84145 PROCALCITONIN (PCT): CPT

## 2020-07-24 PROCEDURE — 74011250637 HC RX REV CODE- 250/637: Performed by: INTERNAL MEDICINE

## 2020-07-24 PROCEDURE — 87899 AGENT NOS ASSAY W/OPTIC: CPT

## 2020-07-24 PROCEDURE — 83615 LACTATE (LD) (LDH) ENZYME: CPT

## 2020-07-24 PROCEDURE — 74011250636 HC RX REV CODE- 250/636: Performed by: INTERNAL MEDICINE

## 2020-07-24 PROCEDURE — 36415 COLL VENOUS BLD VENIPUNCTURE: CPT

## 2020-07-24 PROCEDURE — 31500 INSERT EMERGENCY AIRWAY: CPT

## 2020-07-24 PROCEDURE — 94002 VENT MGMT INPAT INIT DAY: CPT

## 2020-07-24 PROCEDURE — 0100U RESPIRATORY PANEL,PCR,NASOPHARYNGEAL: CPT

## 2020-07-24 PROCEDURE — 83605 ASSAY OF LACTIC ACID: CPT

## 2020-07-24 PROCEDURE — 77010033678 HC OXYGEN DAILY

## 2020-07-24 PROCEDURE — 84484 ASSAY OF TROPONIN QUANT: CPT

## 2020-07-24 PROCEDURE — 36600 WITHDRAWAL OF ARTERIAL BLOOD: CPT

## 2020-07-24 PROCEDURE — 0BH17EZ INSERTION OF ENDOTRACHEAL AIRWAY INTO TRACHEA, VIA NATURAL OR ARTIFICIAL OPENING: ICD-10-PCS | Performed by: ANESTHESIOLOGY

## 2020-07-24 PROCEDURE — 85379 FIBRIN DEGRADATION QUANT: CPT

## 2020-07-24 PROCEDURE — 83735 ASSAY OF MAGNESIUM: CPT

## 2020-07-24 PROCEDURE — C1751 CATH, INF, PER/CENT/MIDLINE: HCPCS

## 2020-07-24 PROCEDURE — 87635 SARS-COV-2 COVID-19 AMP PRB: CPT

## 2020-07-24 PROCEDURE — 87449 NOS EACH ORGANISM AG IA: CPT

## 2020-07-24 PROCEDURE — 82803 BLOOD GASES ANY COMBINATION: CPT

## 2020-07-24 RX ORDER — GUAIFENESIN 100 MG/5ML
81 LIQUID (ML) ORAL DAILY
Status: DISCONTINUED | OUTPATIENT
Start: 2020-07-24 | End: 2020-08-07 | Stop reason: HOSPADM

## 2020-07-24 RX ORDER — HYDRALAZINE HYDROCHLORIDE 20 MG/ML
20 INJECTION INTRAMUSCULAR; INTRAVENOUS
Status: DISCONTINUED | OUTPATIENT
Start: 2020-07-24 | End: 2020-08-07 | Stop reason: HOSPADM

## 2020-07-24 RX ORDER — NYSTATIN 100000 [USP'U]/G
POWDER TOPICAL 2 TIMES DAILY
Status: DISCONTINUED | OUTPATIENT
Start: 2020-07-24 | End: 2020-07-28 | Stop reason: ALTCHOICE

## 2020-07-24 RX ORDER — NOREPINEPHRINE BITARTRATE/D5W 8 MG/250ML
.5-16 PLASTIC BAG, INJECTION (ML) INTRAVENOUS
Status: DISCONTINUED | OUTPATIENT
Start: 2020-07-24 | End: 2020-07-28

## 2020-07-24 RX ORDER — PROPOFOL 10 MG/ML
200 INJECTION, EMULSION INTRAVENOUS
Status: COMPLETED | OUTPATIENT
Start: 2020-07-25 | End: 2020-07-24

## 2020-07-24 RX ORDER — FUROSEMIDE 10 MG/ML
40 INJECTION INTRAMUSCULAR; INTRAVENOUS ONCE
Status: COMPLETED | OUTPATIENT
Start: 2020-07-24 | End: 2020-07-24

## 2020-07-24 RX ORDER — ACETAMINOPHEN 650 MG/1
650 SUPPOSITORY RECTAL
Status: DISCONTINUED | OUTPATIENT
Start: 2020-07-24 | End: 2020-08-07 | Stop reason: HOSPADM

## 2020-07-24 RX ORDER — ONDANSETRON 2 MG/ML
4 INJECTION INTRAMUSCULAR; INTRAVENOUS
Status: DISCONTINUED | OUTPATIENT
Start: 2020-07-24 | End: 2020-08-07 | Stop reason: HOSPADM

## 2020-07-24 RX ORDER — SODIUM CHLORIDE 0.9 % (FLUSH) 0.9 %
5-40 SYRINGE (ML) INJECTION EVERY 8 HOURS
Status: DISCONTINUED | OUTPATIENT
Start: 2020-07-24 | End: 2020-08-07 | Stop reason: HOSPADM

## 2020-07-24 RX ORDER — PROPOFOL 10 MG/ML
0-50 VIAL (ML) INTRAVENOUS
Status: DISCONTINUED | OUTPATIENT
Start: 2020-07-25 | End: 2020-07-29

## 2020-07-24 RX ORDER — ACETAMINOPHEN 325 MG/1
650 TABLET ORAL
Status: DISCONTINUED | OUTPATIENT
Start: 2020-07-24 | End: 2020-08-07 | Stop reason: HOSPADM

## 2020-07-24 RX ORDER — FUROSEMIDE 10 MG/ML
40 INJECTION INTRAMUSCULAR; INTRAVENOUS 2 TIMES DAILY
Status: DISCONTINUED | OUTPATIENT
Start: 2020-07-24 | End: 2020-07-25

## 2020-07-24 RX ORDER — ENOXAPARIN SODIUM 100 MG/ML
40 INJECTION SUBCUTANEOUS DAILY
Status: DISCONTINUED | OUTPATIENT
Start: 2020-07-24 | End: 2020-08-01

## 2020-07-24 RX ORDER — PROPOFOL 10 MG/ML
INJECTION, EMULSION INTRAVENOUS
Status: COMPLETED
Start: 2020-07-24 | End: 2020-07-24

## 2020-07-24 RX ORDER — POLYETHYLENE GLYCOL 3350 17 G/17G
17 POWDER, FOR SOLUTION ORAL DAILY PRN
Status: DISCONTINUED | OUTPATIENT
Start: 2020-07-24 | End: 2020-08-07 | Stop reason: HOSPADM

## 2020-07-24 RX ORDER — METOPROLOL TARTRATE 25 MG/1
25 TABLET, FILM COATED ORAL 2 TIMES DAILY
Status: DISCONTINUED | OUTPATIENT
Start: 2020-07-24 | End: 2020-08-07 | Stop reason: HOSPADM

## 2020-07-24 RX ORDER — ATORVASTATIN CALCIUM 10 MG/1
10 TABLET, FILM COATED ORAL DAILY
Status: DISCONTINUED | OUTPATIENT
Start: 2020-07-24 | End: 2020-08-07 | Stop reason: HOSPADM

## 2020-07-24 RX ORDER — SODIUM CHLORIDE 0.9 % (FLUSH) 0.9 %
5-40 SYRINGE (ML) INJECTION AS NEEDED
Status: DISCONTINUED | OUTPATIENT
Start: 2020-07-24 | End: 2020-08-07 | Stop reason: HOSPADM

## 2020-07-24 RX ADMIN — ENOXAPARIN SODIUM 40 MG: 30 INJECTION SUBCUTANEOUS at 09:26

## 2020-07-24 RX ADMIN — ASPIRIN 81 MG CHEWABLE TABLET 81 MG: 81 TABLET CHEWABLE at 09:27

## 2020-07-24 RX ADMIN — Medication 10 ML: at 18:53

## 2020-07-24 RX ADMIN — PROPOFOL 100 MG: 10 INJECTION, EMULSION INTRAVENOUS at 23:00

## 2020-07-24 RX ADMIN — FUROSEMIDE 40 MG: 10 INJECTION, SOLUTION INTRAMUSCULAR; INTRAVENOUS at 09:27

## 2020-07-24 RX ADMIN — FUROSEMIDE 40 MG: 10 INJECTION, SOLUTION INTRAMUSCULAR; INTRAVENOUS at 18:53

## 2020-07-24 RX ADMIN — METOPROLOL TARTRATE 25 MG: 25 TABLET, FILM COATED ORAL at 09:27

## 2020-07-24 RX ADMIN — Medication 10 ML: at 14:00

## 2020-07-24 RX ADMIN — NYSTATIN: 100000 POWDER TOPICAL at 11:32

## 2020-07-24 RX ADMIN — Medication 40 ML: at 22:00

## 2020-07-24 RX ADMIN — Medication 10 ML: at 09:34

## 2020-07-24 RX ADMIN — ATORVASTATIN CALCIUM 10 MG: 20 TABLET, FILM COATED ORAL at 09:27

## 2020-07-24 RX ADMIN — FUROSEMIDE 40 MG: 10 INJECTION, SOLUTION INTRAMUSCULAR; INTRAVENOUS at 03:04

## 2020-07-24 NOTE — ED NOTES
TRANSFER - OUT REPORT:    Verbal report given to Rowena(name) on Angelo Mery  being transferred to PCU(unit) for routine progression of care       Report consisted of patients Situation, Background, Assessment and   Recommendations(SBAR). Information from the following report(s) SBAR, Kardex, ED Summary, [facility] and Recent Results was reviewed with the receiving nurse. Lines:   Peripheral IV 07/24/20 Left Hand (Active)   Site Assessment Clean, dry, & intact 07/24/20 0018   Phlebitis Assessment 0 07/24/20 0018   Infiltration Assessment 0 07/24/20 0018   Dressing Status Clean, dry, & intact 07/24/20 0018   Dressing Type 4 X 4;Transparent 07/24/20 0018   Hub Color/Line Status Green;Flushed;Patent 07/24/20 0018   Action Taken Blood drawn 07/24/20 0018   Alcohol Cap Used Yes 07/24/20 0018        Opportunity for questions and clarification was provided.       Patient transported with:   Monitor  O2 @ 4 liters  Registered Nurse

## 2020-07-24 NOTE — PROGRESS NOTES
RAPID RESPONSE TEAM    Rounded on patient due to rapid response - unresponsive. Discussed with primary RN, [address] Called for unresponsive pt. Upon arrival pt intermittently and minimally responsive to painful stimuli. Pt not speaking but intermittently following commands. Pt was mildly hypoxic. L pupil 3mm brisk responsiveness, R pupil 3mm sluggish response. Dr. Elsworth Peabody arrived at bedside, MD declined code S workup. Ordered ABG. CO2 >90. Orders received to txfr to CCU for bipap. Repeat ABG at 1900. If no improvement in LOC, CCU RN to notify hospitalist for futher evaluation and possible CT. Assisted in txfr to CCU. Berto Garnett RN updated on plan of care. Visit Vitals  /48   Pulse 82   Temp 98.6 °F (37 °C)   Resp 26   SpO2 93%          Please call with any questions or concerns.      Thony Peraza

## 2020-07-24 NOTE — H&P
[facility] tele[facility]     Called for admission   This is a 60year old female here for swelling of the legs , shortness pf breath , hypoxia   No clinical signs of pneumonia   R/O COVID 19     ADMIT to inpatient   DVT prohp  Oxygen as needed   Await testing

## 2020-07-24 NOTE — PROGRESS NOTES
0700: Verbal shift change report given to [facility] (oncoming nurse) by Deysi Lozano (offgoing nurse). Report included the following information SBAR, Kardex, ED Summary, Intake/Output, MAR, Recent Results, Med Rec Status and Cardiac Rhythm NSR with hx of tachy. Patient has skin breakdown on all of her skin folds and buttocks that has moisture. Patient denies pain. Messaged MD and nystatin has been ordered. Patient states she is scared to walk and \"doesn't really walk\" at home. Messaged MD for PT consult. PT consult ordered. At approximately 33 64 74, patient becoming unresponsive, slow to arouse, not focusing, and oxygen saturation dropping to 88 on 6L. Rapid Response called. 1730: TRANSFER - OUT REPORT:    Verbal report given to Hilary Sanford (name) on Thorntonlazarus Elise  being transferred to CCU (unit) for change in patient condition(hypercapnic)       Report consisted of patients Situation, Background, Assessment and   Recommendations(SBAR). Information from the following report(s) SBAR, Kardex, ED Summary, Procedure Summary, Intake/Output, MAR, Recent Results, Med Rec Status and Cardiac Rhythm NSR was reviewed with the receiving nurse. Lines:   Peripheral IV 07/24/20 Left Hand (Active)   Site Assessment Clean, dry, & intact 07/24/20 1200   Phlebitis Assessment 0 07/24/20 1200   Infiltration Assessment 0 07/24/20 1200   Dressing Status Clean, dry, & intact 07/24/20 1200   Dressing Type Transparent 07/24/20 1200   Hub Color/Line Status Green;Flushed 07/24/20 1200   Action Taken Blood drawn 07/24/20 0539   Alcohol Cap Used Yes 07/24/20 1200        Opportunity for questions and clarification was provided.       Patient transported with:   Monitor  O2 @ 15 liters

## 2020-07-24 NOTE — PROGRESS NOTES
26: TRANSFER - IN REPORT:    Verbal report received from Danette Cortes [facility] (name) on Vic Mckeon  being received from ED(unit) for routine progression of care      Report consisted of patients Situation, Background, Assessment and   Recommendations(SBAR). Information from the following report(s) SBAR, MAR and Cardiac Rhythm NSR was reviewed with the receiving nurse. Opportunity for questions and clarification was provided. Assessment completed upon patients arrival to unit and care assumed. 0445: Pt arrived on unit care assumed. Assessment: Pt had soiled the bed. Pt has what looks to be yeast and skin breakdown on :   Armpits   Under breast   Pannus    inner thighs   Bottom  AND  Inner butt cheeks   Behind knee in skin folds     Dead skin caked on feet , Unable to remove all, pt complained of soreness. Pt is weak and could not help us with moving her around in bed.  Pt states that she does not move around at home and mostly stays in bed.       0700: Shift report given to Mychal Mayfield [address] [place] [address]

## 2020-07-24 NOTE — PROGRESS NOTES
1730 TRANSFER - IN REPORT:    Verbal report received from Dianne(name) on Essence Alvarez  being received from PCU(unit) for routine progression of care      Report consisted of patients Situation, Background, Assessment and   Recommendations(SBAR). Information from the following report(s) SBAR, Kardex, ED Summary, Procedure Summary, Intake/Output, MAR, Recent Results and Cardiac Rhythm NSR was reviewed with the receiving nurse. Opportunity for questions and clarification was provided. Assessment completed upon patients arrival to unit and care assumed. 1735 pt received from [facility], transferred to bed with 6 person transfer. Pt placed on Bipap 20/10. Pt is responsive to voice, crackles bibasilar, pitting edema to BLE, non pitting BUE, gross yeast rash to all skin folds and feet. CHG bath complete,Primary Nurse Derrick Hatch RN and Arthur Celeste RN performed a dual skin assessment on this patient Impairment noted- see wound doc flow sheet. Gaston score is 12  1825 bipap changed to 26/5 by RT per Ludinla/Jordana recommendation. 1930 Bedside and Verbal shift change report given to [facility] (oncoming nurse) by Claude Gambler with [facility] (offgoing nurse). Report included the following information SBAR, Kardex, Procedure Summary, Intake/Output, MAR, Recent Results and Cardiac Rhythm NSR. RT and Kat aware of ABG's due and stat head CT if no improvement noted. Pt is slightly more alert, able to nod head and follow simple commands.

## 2020-07-24 NOTE — PROGRESS NOTES
Physical Therapy    Orders received, chart reviewed and patient is currently under investigation for COVID-19. In attempts to have only essential personnel enter the room and conserve PPE, we will confer with nursing and/or the referring provider to determine the most appropriate timing of our therapy intervention. Until this time, we will follow the patient peripherally to support nursing staff on an appropriate care plan. Discussed with RN, patient is non ambulatory at home and stand pivots to [facility]. Recommend use of bed pan for safety and will continue to follow for eval pending results  of Covid testing. Thank you for your assistance.

## 2020-07-24 NOTE — PROGRESS NOTES
Hospitalist    RAT call, less responsive in late afternoon  Very slow to arouse,falls asleep quickly, no longer verbal  Required increased O2  I ordered Stat ABG 7.17, pCO2 > 90, pO2 88s on 15 liters high flow  Transferred to ICU    Patient Vitals for the past 24 hrs:   Temp Pulse Resp BP SpO2   07/24/20 1800  80 22 94/69 91 %   07/24/20 1746  82 26 121/48 93 %   07/24/20 1734 98.6 °F (37 °C) 83 16 121/48 94 %   07/24/20 1715     93 %   07/24/20 1710  83 28 93/42 91 %   07/24/20 1705  83 (!) 37 (!) 97/37 93 %   07/24/20 1701  83 27 (!) 94/29 91 %   07/24/20 1700  81 24 110/59 91 %   07/24/20 1656  82 22 116/45 91 %   07/24/20 1648 98.2 °F (36.8 °C) 82 24 116/45 92 %   07/24/20 1600  77  112/60 (!) 89 %   07/24/20 1540  79   95 %   07/24/20 1500 98 °F (36.7 °C) 80 22 136/67 92 %   07/24/20 1200 98 °F (36.7 °C) 77 22 131/64 95 %   07/24/20 0926 98 °F (36.7 °C) 80 22 133/59 100 %     Lethargic, hard to arouse, then quickly falls asleep  Lungs crackles at bases  RRR, Nl S1/S2  1+ edema    Impression:  Acute respiratory failure with hypoxia and hypercarbia    Acute left CHF    Metabolic encephalopathy due to hypercarbia    SARS-CoV-2 rule out      Plan:  Ordered BIPAP, repeat ABG in 1 hour  Transfer to ICU, if does not improve quickly, will need intubation  Care d/w Dr Alyssa Ann by phone  Continued Diuresis  Hold antibiotics with procalcitonin < 0.05, no SIRS criteria  If MS not improved on BIPAP, will check head CT stat  Spoke with daughter about history, current events, ICU transfer and guarded prognosis  Full code    I have provided 35  minutes of critical care time. During this entire length of time I was immediately available to the patient.      The reason for providing this level of medical care was due to a critical illness that impaired one or more vital organ systems, such that there was a high probability of imminent or life threatening deterioration in the patient's condition.  This care involved high complexity decision making which includes reviewing the patient's past medical records, current laboratory results, and actual Xray films in order to assess, support vital system function, and to treat this degree of vital organ system failure, and to prevent further life threatening deterioration of the patients condition.      Tracy Duke MD

## 2020-07-24 NOTE — PROGRESS NOTES
Orders received, chart reviewed and patient is currently under investigation for COVID-19. In attempts to have only essential personnel enter the room and conserve PPE, we will confer with nursing and/or the referring provider to determine the most appropriate timing of our therapy intervention. Until this time, we will follow the patient peripherally to support nursing staff on an appropriate care plan. Thank you for your assistance.        Mya OT

## 2020-07-24 NOTE — ED PROVIDER NOTES
EMERGENCY DEPARTMENT HISTORY AND PHYSICAL EXAM      Please note that this dictation was completed with [employer], the computer voice recognition software. Quite often unanticipated grammatical, syntax, homophones, and other interpretive errors are inadvertently transcribed by the computer software. Please disregard these errors and any errors that have escaped final proofreading. Thank you. Date: 7/23/2020  Patient Name: Pili Andino  Patient Age and Sex: 60 y.o. female    History of Presenting Illness     Chief Complaint   Patient presents with    Shortness of Breath     patient states that she has been feeling short of breath for 2 days. She states that she was having chest pain earlier today but denies pain now. History Provided By: Patient and EMS    HPI: Pili Andino, 60 y.o. female with past medical history as documented below presents to the ED with c/o of 2 days of shortness of breath with associated lower extremity edema. Patient does have a history of hypertension, diabetes and remote history of asthma. Patient states that she has been quite short of breath even at rest for the past 2 days. She has also noticed a dry cough during this period. She denies any sick contacts or exposure to COVID-19. Per EMS, vital signs notable for profound hypoxia 80% on room air, placed on nonrebreather with improved oxygenations. Patient also noticed onset of diffuse chest tightness onset about 4 hours ago currently resolved without intervention. She denies any previous history of CHF, liver disease or kidney disease. She does not take any diuretics. Denies any prolonged travel, history of DVT or PE, although she admits that she doesn't move much due to her weight. Of note, does not wear oxygen at baseline. Pt denies any other alleviating or exacerbating factors.  Additionally, pt specifically denies any recent fever, chills, headache, nausea, vomiting, abdominal pain, lightheadedness, dizziness, numbness, weakness, heart palpitations, urinary sxs, diarrhea, constipation, melena, hematochezia. There are no other complaints, changes or physical findings at this time. PCP: Hernan Villafuerte NP    Past History   Past Medical History:  Past Medical History:   Diagnosis Date    Diabetes ([facility] [address]     Hypertension     Ill-defined condition     high cholesterol    Ill-defined condition     tachycardia    Other and unspecified symptoms and signs involving general sensations and perceptions     ruptured disc       Past Surgical History:  Denies    Family History:  Denies    Social History:  Social History     Tobacco Use    Smoking status: Former Smoker   Substance Use Topics    Alcohol use: No    Drug use: No       Allergies: Allergies   Allergen Reactions    Other Plant, Animal, Environmental Rash     Ivory soap caused rash on hands. Current Medications:  No current facility-administered medications on file prior to encounter. Current Outpatient Medications on File Prior to Encounter   Medication Sig Dispense Refill    aspirin 81 mg chewable tablet Take 81 mg by mouth daily.  atorvastatin (LIPITOR) 10 mg tablet Take  by mouth daily.  furosemide (LASIX) 20 mg tablet Take 20 mg by mouth daily.  metoprolol (LOPRESSOR) 25 mg tablet Take 25 mg by mouth two (2) times a day. Review of Systems   Review of Systems   Constitutional: Positive for activity change. Negative for chills and fever. HENT: Negative. Negative for congestion, facial swelling, rhinorrhea, sore throat, trouble swallowing and voice change. Eyes: Negative. Respiratory: Positive for cough, chest tightness and shortness of breath. Negative for apnea and wheezing. Cardiovascular: Positive for chest pain and leg swelling. Negative for palpitations. Gastrointestinal: Negative. Negative for abdominal distention, abdominal pain, blood in stool, constipation, diarrhea, nausea and vomiting. Endocrine: Negative. Negative for cold intolerance, heat intolerance and polyuria. Genitourinary: Negative. Negative for difficulty urinating, dysuria, flank pain, frequency, hematuria and urgency. Musculoskeletal: Negative. Negative for arthralgias, back pain, myalgias, neck pain and neck stiffness. Skin: Negative. Negative for color change and rash. Neurological: Negative. Negative for dizziness, syncope, facial asymmetry, speech difficulty, weakness, light-headedness, numbness and headaches. Hematological: Negative. Does not bruise/bleed easily. Psychiatric/Behavioral: Negative. Negative for confusion and self-injury. The patient is not nervous/anxious. Physical Exam   Physical Exam  Vitals signs and nursing note reviewed. Constitutional:       General: She is in acute distress. Appearance: She is well-developed. She is ill-appearing, toxic-appearing and diaphoretic. Comments: Obese  female sitting upright in bed in moderate respiratory distress   HENT:      Head: Normocephalic and atraumatic. Mouth/Throat:      Pharynx: No oropharyngeal exudate. Eyes:      Conjunctiva/sclera: Conjunctivae normal.      Pupils: Pupils are equal, round, and reactive to light. Neck:      Musculoskeletal: Normal range of motion. Vascular: JVD present. Cardiovascular:      Rate and Rhythm: Normal rate and regular rhythm. Heart sounds: Normal heart sounds. No murmur. No friction rub. No gallop. Pulmonary:      Effort: Tachypnea, accessory muscle usage and respiratory distress present. Breath sounds: Wheezing present. No rales. Comments: Patient initially on nonrebreather, weaned off to 4 L nasal cannula with saturations 90%. Chest:      Chest wall: No tenderness. Abdominal:      General: Bowel sounds are normal. There is no distension. Palpations: Abdomen is soft. There is no mass. Tenderness: There is no abdominal tenderness.  There is no guarding or rebound. Musculoskeletal: Normal range of motion. General: No tenderness or deformity. Right lower leg: Edema present. Left lower leg: Edema present. Skin:     General: Skin is warm. Findings: No rash. Neurological:      Mental Status: She is alert and oriented to person, place, and time. Cranial Nerves: No cranial nerve deficit. Motor: No abnormal muscle tone.       Coordination: Coordination normal.      Deep Tendon Reflexes: Reflexes normal.         Diagnostic Study Results     Labs -  Recent Results (from the past 24 hour(s))   EKG, 12 LEAD, INITIAL    Collection Time: 07/23/20 11:59 PM   Result Value Ref Range    Ventricular Rate 142 BPM    Atrial Rate 150 BPM    P-R Interval 156 ms    QRS Duration 68 ms    Q-T Interval 386 ms    QTC Calculation (Bezet) 593 ms    Calculated P Axis 68 degrees    Calculated R Axis 125 degrees    Calculated T Axis 83 degrees    Diagnosis       Undetermined rhythm  Left posterior fascicular block  Possible Inferior infarct , age undetermined  Cannot rule out Anterior infarct , age undetermined  When compared with ECG of 16-FEB-2010 09:11,  Current undetermined rhythm precludes rhythm comparison, needs review  Left posterior fascicular block is now present  Borderline criteria for Inferior infarct are now present  Nonspecific T wave abnormality now evident in Inferior leads     CBC WITH AUTOMATED DIFF    Collection Time: 07/24/20 12:16 AM   Result Value Ref Range    WBC 12.0 (H) 3.6 - 11.0 K/uL    RBC 4.77 3.80 - 5.20 M/uL    HGB 15.0 11.5 - 16.0 g/dL    HCT 51.3 (H) 35.0 - 47.0 %    .5 (H) 80.0 - 99.0 FL    MCH 31.4 26.0 - 34.0 PG    MCHC 29.2 (L) 30.0 - 36.5 g/dL    RDW 15.2 (H) 11.5 - 14.5 %    PLATELET 930 165 - 871 K/uL    MPV 12.6 8.9 - 12.9 FL    NRBC 0.0 0  WBC    ABSOLUTE NRBC 0.00 0.00 - 0.01 K/uL    NEUTROPHILS 78 (H) 32 - 75 %    LYMPHOCYTES 14 12 - 49 %    MONOCYTES 6 5 - 13 %    EOSINOPHILS 1 0 - 7 % BASOPHILS 0 0 - 1 %    IMMATURE GRANULOCYTES 1 (H) 0.0 - 0.5 %    ABS. NEUTROPHILS 9.3 (H) 1.8 - 8.0 K/UL    ABS. LYMPHOCYTES 1.7 0.8 - 3.5 K/UL    ABS. MONOCYTES 0.8 0.0 - 1.0 K/UL    ABS. EOSINOPHILS 0.2 0.0 - 0.4 K/UL    ABS. BASOPHILS 0.0 0.0 - 0.1 K/UL    ABS. IMM. GRANS. 0.1 (H) 0.00 - 0.04 K/UL    DF AUTOMATED     METABOLIC PANEL, COMPREHENSIVE    Collection Time: 07/24/20 12:16 AM   Result Value Ref Range    Sodium 141 136 - 145 mmol/L    Potassium 5.1 3.5 - 5.1 mmol/L    Chloride 103 97 - 108 mmol/L    CO2 36 (H) 21 - 32 mmol/L    Anion gap 2 (L) 5 - 15 mmol/L    Glucose 145 (H) 65 - 100 mg/dL    BUN 25 (H) 6 - 20 MG/DL    Creatinine 0.99 0.55 - 1.02 MG/DL    BUN/Creatinine ratio 25 (H) 12 - 20      GFR est AA >60 >60 ml/min/1.73m2    GFR est non-AA 57 (L) >60 ml/min/1.73m2    Calcium 8.8 8.5 - 10.1 MG/DL    Bilirubin, total 0.7 0.2 - 1.0 MG/DL    ALT (SGPT) 25 12 - 78 U/L    AST (SGOT) 25 15 - 37 U/L    Alk.  phosphatase 88 45 - 117 U/L    Protein, total 7.9 6.4 - 8.2 g/dL    Albumin 3.1 (L) 3.5 - 5.0 g/dL    Globulin 4.8 (H) 2.0 - 4.0 g/dL    A-G Ratio 0.6 (L) 1.1 - 2.2     NT-PRO BNP    Collection Time: 07/24/20 12:16 AM   Result Value Ref Range    NT pro-BNP 2,987 (H) <125 PG/ML   TROPONIN I    Collection Time: 07/24/20 12:16 AM   Result Value Ref Range    Troponin-I, Qt. <0.05 <0.05 ng/mL   CK W/ REFLX CKMB    Collection Time: 07/24/20 12:16 AM   Result Value Ref Range    CK 39 26 - 192 U/L   MAGNESIUM    Collection Time: 07/24/20 12:16 AM   Result Value Ref Range    Magnesium 2.7 (H) 1.6 - 2.4 mg/dL   LACTIC ACID    Collection Time: 07/24/20  2:56 AM   Result Value Ref Range    Lactic acid 1.5 0.4 - 2.0 MMOL/L   SARS-COV-2    Collection Time: 07/24/20  2:56 AM   Result Value Ref Range    Specimen source Nasopharyngeal      SARS-CoV-2 PENDING     SARS-CoV-2 PENDING     Specimen source Nasopharyngeal      COVID-19 rapid test PENDING     COVID-19 PENDING NEG   LD    Collection Time: 07/24/20  2:56 AM Result Value Ref Range     (H) 81 - 246 U/L   D DIMER    Collection Time: 07/24/20  2:56 AM   Result Value Ref Range    D-dimer 1.58 (H) 0.00 - 0.65 mg/L FEU   FIBRINOGEN    Collection Time: 07/24/20  2:56 AM   Result Value Ref Range    Fibrinogen 414 200 - 475 mg/dL   PROCALCITONIN    Collection Time: 07/24/20  2:56 AM   Result Value Ref Range    Procalcitonin <0.05 ng/mL       Radiologic Studies -   XR CHEST PORT   Final Result   IMPRESSION: Findings most compatible with diffuse increase in pulmonary edema   with pulmonary vascular congestion versus severe infectious infiltrate. Exam   compromised by poor penetration. CT Results  (Last 48 hours)    None        CXR Results  (Last 48 hours)               07/24/20 0048  XR CHEST PORT Final result    Impression:  IMPRESSION: Findings most compatible with diffuse increase in pulmonary edema   with pulmonary vascular congestion versus severe infectious infiltrate. Exam   compromised by poor penetration. Narrative:  EXAM: XR CHEST PORT       INDICATION: sob       COMPARISON: Chest x-ray 4/19/2009. FINDINGS: A portable AP radiograph of the chest was obtained at 00:37 hours. The   patient is on a cardiac monitor. The examination is compromised by poor   penetration of patient habitus. There is an appearance of airspace infiltrate in   the perihilar lungs with pulmonary vascular congestion. No pneumothorax or   pleural effusion. The cardiac and mediastinal contours and pulmonary vascularity   are normal.  The bones and soft tissues are grossly within normal limits. Medical Decision Making   I am the first provider for this patient. I reviewed the vital signs, available nursing notes, past medical history, past surgical history, family history and social history. Vital Signs-Reviewed the patient's vital signs.   Patient Vitals for the past 24 hrs:   Temp Pulse Resp BP SpO2   07/24/20 0307 97.8 °F (36.6 °C)       07/24/20 0304  81  149/67    07/24/20 0245  80 30 149/67 (!) 88 %   07/24/20 0230  82 26 154/81 91 %   07/24/20 0215  80 30 153/87 92 %   07/24/20 0200  85 28 141/71 90 %   07/24/20 0145  82 21 138/74 90 %   07/24/20 0130  79 (!) 34 145/72 91 %   07/24/20 0045  83 22 126/74 91 %   07/24/20 0040  80 29  (!) 89 %   07/24/20 0039  78 26  90 %   07/24/20 0038  80 22  90 %   07/24/20 0037  81 (!) 33  90 %   07/24/20 0004     97 %   07/23/20 2358 98.2 °F (36.8 °C) 79 24 167/90 97 %   07/23/20 2356 98.2 °F (36.8 °C) 79 26 167/90 96 %   07/23/20 2350     93 %   07/23/20 2349     (!) 89 %   07/23/20 2348     (!) 77 %       Pulse Oximetry Analysis - 77% on RA    Cardiac Monitor:   Rate: 79 bpm  Rhythm: Normal Sinus Rhythm      ED EKG interpretation:  Rhythm: sinus tachycardia; and regular . Rate (approx.): 142; Axis: normal; P wave: normal; QRS interval: normal ; ST/T wave: normal; Other findings: Left posterior fascicular blockLeft posterior fascicular block. This EKG was interpreted by Samuel Franco M.D. Records Reviewed: Nursing Notes, Old Medical Records, Previous electrocardiograms, Previous Radiology Studies and Previous Laboratory Studies    Provider Notes (Medical Decision Making):   Patient presents with acute dyspnea, significant hypoxia. DDx: asthma, copd, pna, pulmonary edema, acute bronchitis, ACS, ptx, pna. Will obtain EKG, labs, CXR, provide O2 as needed for hypoxia, treat symptomatically and reassess. Will continue to monitor closely in ED. ED Course:   Initial assessment performed. The patients presenting problems have been discussed, and they are in agreement with the care plan formulated and outlined with them. I have encouraged them to ask questions as they arise throughout their visit. HYPERTENSION COUNSELING  For 10 minutes, education was provided to the patient today regarding their hypertension.  Patient is made aware of their elevated blood pressure and is instructed to follow up this week with their Primary Care for a recheck. Patient is counseled regarding consequences of chronic, uncontrolled hypertension including kidney disease, heart disease, stroke or even death. Patient states their understanding and agrees to follow up this week. Additionally, during their visit, I discussed sodium restriction, maintaining ideal body weight and regular exercise program as physiologic means to achieve blood pressure control. The patient will strive towards this. I reviewed our electronic medical record system for any past medical records that were available that may contribute to the patient's current condition, the nursing notes and vital signs from today's visit. Zuri Brandt MD    ED Orders Placed :  Orders Placed This Encounter    MECHANICAL PROPHYLAXIS IS CONTRAINDICATED Other, please document Already on Anticoagulation    RESPIRATORY PANEL,PCR,NASOPHARYNGEAL    LEGIONELLA PNEUMOPHILA AG, URINE    S. PNEUMO AG, UR/CSF    XR CHEST PORT    CBC WITH AUTOMATED DIFF    METABOLIC PANEL, COMPREHENSIVE    NT-PRO BNP    TROPONIN I    CK W/REFLEX CKMB    MAGNESIUM    LACTIC ACID    SARS-COV-2    LD    D DIMER    FERRITIN    FIBRINOGEN    PROCALCITONIN    DIET CARDIAC Regular    NOTIFY PROVIDER: SPECIFY Notify provider on pt's arrival to floor ONE TIME STAT    BEDREST, COMPLETE    INTAKE AND OUTPUT    VITAL SIGNS PER UNIT ROUTINE    VITAL SIGNS    UP AD PRADIP    FULL CODE    DROPLET PLUS    EKG 12 LEAD INITIAL    INSERT PERIPHERAL IV ONE TIME STAT    furosemide (LASIX) injection 40 mg    sodium chloride (NS) flush 5-40 mL    sodium chloride (NS) flush 5-40 mL    OR Linked Order Group     acetaminophen (TYLENOL) tablet 650 mg     acetaminophen (TYLENOL) suppository 650 mg    polyethylene glycol (MIRALAX) packet 17 g    enoxaparin (LOVENOX) injection 40 mg    ondansetron (ZOFRAN) injection 4 mg    IP CONSULT TO HOSPITALIST    INITIAL PHYSICIAN ORDER: INPATIENT Medical; 9. Other (further clarification in H&P documentation)     ED Medications Administered:  Medications   sodium chloride (NS) flush 5-40 mL (20 mL IntraVENous Given 7/27/20 2117)   sodium chloride (NS) flush 5-40 mL (40 mL IntraVENous Given 7/27/20 0837)   acetaminophen (TYLENOL) tablet 650 mg (650 mg Oral Given 7/27/20 2038)     Or   acetaminophen (TYLENOL) suppository 650 mg ( Rectal See Alternative 7/27/20 2038)   polyethylene glycol (MIRALAX) packet 17 g (has no administration in time range)   enoxaparin (LOVENOX) injection 40 mg ( SubCUTAneous Automatically Held 8/7/20 0900)   ondansetron (ZOFRAN) injection 4 mg (has no administration in time range)   aspirin chewable tablet 81 mg (81 mg Oral Given 7/27/20 0905)   metoprolol tartrate (LOPRESSOR) tablet 25 mg ( Oral Automatically Held 8/7/20 1800)   atorvastatin (LIPITOR) tablet 10 mg ( Oral Automatically Held 8/7/20 0900)   nystatin (MYCOSTATIN) 100,000 unit/gram powder ( Topical Given 7/27/20 1800)   hydrALAZINE (APRESOLINE) 20 mg/mL injection 20 mg (has no administration in time range)   NOREPINephrine (LEVOPHED) 8 mg in 5% dextrose 250mL (32 mcg/mL) infusion (0 mcg/min IntraVENous Transfusion Held 7/24/20 2300)   propofol (DIPRIVAN) 10 mg/mL infusion (0 mcg/kg/min × 136.1 kg IntraVENous Stopped 7/27/20 0745)   fentaNYL (PF) 1,500 mcg/30 mL (50 mcg/mL) infusion (100 mcg/hr IntraVENous New Bag 7/27/20 1223)   insulin lispro (HUMALOG) injection (0 Units SubCUTAneous Held 7/28/20 0000)   glucose chewable tablet 16 g (has no administration in time range)   dextrose (D50W) injection syrg 12.5-25 g (25 g IntraVENous Given 7/25/20 1302)   glucagon (GLUCAGEN) injection 1 mg (has no administration in time range)   dexmedeTOMidine (PRECEDEX) 400 mcg in 0.9% sodium chloride 100 mL infusion (0.3 mcg/kg/hr × 150 kg IntraVENous Rate Verify 7/27/20 2000)   dextrose 5% infusion (50 mL/hr IntraVENous Rate Verify 7/27/20 2000)   alcohol 62% (NOZIN) nasal  1 Ampule (1 Ampule Topical Given 7/27/20 2031)   chlorhexidine (ORAL CARE KIT) 0.12 % mouthwash 15 mL (15 mL Oral Given 7/27/20 2031)   famotidine (PF) (PEPCID) 20 mg in 0.9% sodium chloride 10 mL injection (20 mg IntraVENous Given 7/27/20 2031)   cefepime (MAXIPIME) 2 g in 0.9% sodium chloride (MBP/ADV) 100 mL (2 g IntraVENous New Bag 7/27/20 2333)   balsam peru-castor oiL (VENELEX) ointment ( Topical Given 7/27/20 1759)   furosemide (LASIX) injection 40 mg (40 mg IntraVENous Given 7/24/20 0304)   propofoL (DIPRIVAN) 10 mg/mL injection 200 mg (100 mg IntraVENous Given 7/24/20 2300)   potassium bicarb-citric acid (EFFER-K) tablet 40 mEq (40 mEq Oral Given 7/26/20 1205)   perflutren lipid microspheres (DEFINITY) contrast injection 2 mL (2 mL IntraVENous Given 7/27/20 0931)   potassium bicarb-citric acid (EFFER-K) tablet 40 mEq (40 mEq Oral Given 7/27/20 1823)   acetaZOLAMIDE (DIAMOX) 500 mg in sterile water (preservative free) 5 mL injection (500 mg IntraVENous Given 7/27/20 1352)         Progress Note:  Given concerns for COVID-19 infection in this patient, I spent extra time to ensure proper and full PPE was used for the initial assessment and for subsequent patient encounters for updates and reassessments. This was done to help combat the transmission of the virus to myself and other patients and staff in the emergency department. Progress Note:  ABG reviewed, pH 7.17, PCO > 90, significant hypercarbia, may need BIPAP    Progress Note:  COVID-19 test sent, RR 33, sats 77% on RA, pt remains on 6L NC, continuing oxygen therapy    Progress Note:  BNP elevated at 2,987, will diurese with IV Lasix    Progress Note:  Procalcitonin < 0.05, less likely infectious, will need ECHO, diuresis and oxygen therapy    Progress Note:  Pt reassessed; reports some improvement of shortness of breath, remains on oxygen therapy, will admit    Progress Note:  I have just re-evaluated the patient.  Patient reports improvement of sx's. I have reviewed Her vital signs and determined there is currently no worsening in their condition or physical exam. Results have been reviewed with them and their questions have been answered. We will continue to review further results as they come available. Consult Note:  Clary Gongora MD spoke with Dr. Camron Pires  Specialty: Hospitalist  Discussed pt's hx, disposition, and available diagnostic and imaging results. Reviewed care plans. Agree with management and plan thus far. Consultant will evaluate pt for admission. CRITICAL CARE NOTE :  IMPENDING DETERIORATION -Airway, Respiratory, Cardiovascular, Metabolic and Renal  ASSOCIATED RISK FACTORS - Hypotension, Shock, Hypoxia, Dysrhythmia, Metabolic changes and Dehydration  MANAGEMENT- Bedside Assessment and Supervision of Care  INTERPRETATION -  Xrays, CT Scan, Blood Gases, ECG, Blood Pressure and Cardiac Output Measures   INTERVENTIONS - hemodynamic mngmt and Metobolic interventions, oxygen therapy, diuresis for acute CHF  CASE REVIEW - Hospitalist, Nursing and Family  TREATMENT RESPONSE -Improved  PERFORMED BY - Self    NOTES   :  I personally spent 75 minutes of critical care time. This is time spent at this critically ill patient's bedside actively involved in patient care as well as the coordination of care and discussions with the patient's family. This includes time involved in lab review, consultations with specialist, family decision-making, bedside attention and documentation. During this entire length of time I was immediately available to the patient. This does not include any procedural time which has been billed separately. Critical Care: The reason for providing this level of medical care for this critically-ill patient was due to a critical illness that impaired one or more vital organ systems, such that there was a high probability of imminent or life-threatening deterioration in the patient's condition.  This care involved the highest level of preparedness to intervene urgently. This care involved high complexity decision making to assess, manipulate, and support vital system functions, to treat this degree of vital organ system failure, and to prevent further life threatening deterioration of the patients condition requiring frequent assessments and interventions. Jason Greco MD    Disposition: Admit  Patient is being admitted to the hospital. The results of their tests and reasons for their admission have been discussed with them and/or available family. I have discussed the case with the admitting specialist and expressed my high concern for decompensation if patient is discharged from the ED. The patient and/or available family convey agreement and understanding for the need to be admitted and for their admission diagnosis. Consultation has been made with the inpatient physician specialist for hospitalization. Diagnosis   Clinical Impression:   1. Acute respiratory failure with hypoxia ([address]    2. Respiratory distress    3. Acute pulmonary edema (HCC)    4. Suspected COVID-19 virus infection    5. Acute on chronic diastolic CHF (congestive heart failure) ([address]    6. Atypical pneumonia      Attestation:  Bevelyn Boxer, MD, am the attending of record for this patient. I personally performed the services described in this documentation on this date, 7/23/2020 for patient, Red Flores. I have reviewed the chart and verified that the record is accurate and complete. This note will not be viewable in [address]

## 2020-07-24 NOTE — H&P
Hospitalist Admission Note    NAME:  Grayson Reddy   :   1960   MRN:   [id number]     Date of admit: 2020    PCP: Mina Delacruz NP    Assessment/Plan:     Acute respiratory failure with hypoxia POA RR 33, CHOPRA, O2 sats 77% room air  Probable acute on chronic diastolic CHF POA  IGPOR-43 rule out  Baseline does not wear home o2, admits to chronic CHOPRA  Past few days worsening CHOPRA and SOB at rest  Positive LE edema and orthopnea  No fevers, positive dry cough  CXR with bilateral interstitial edema   pBNP 2,987  procalcitonin < 0.05  Currently on 4 liters  Differential; Acute left CHF, atypical pneumonia   Bacterial pneumonia unlikely  SARS-CoV-2 testing is pending, isolation  IV lasix  ASA, B-blocker  Serial cardiac enzymes  Hold antibiotics  Echo  Wean o2 as tolerated    Essential HTN POA  Hyperlipidemia POA  Continue metoprolol  Continue lipitor  PRN labetalol  Check lipid panel    DM type 2 POA  Not currently on treatment  POC, SSI  Check HgBa1c    Remote tobacco use quit 40 years ago    Impaired mobility POA   Not walking much at home    Fungal rash in trunk skin folds POA  Topical nystatin    Given the patient's current clinical presentation, I have a high level of concern for decompensation if discharged from the emergency department. My assessment of this patient's clinical condition and my plan of care is as noted above.     DVT prophylaxis with lovenox    Code status: full code  NOK: daughter    History     CHIEF COMPLAINT: \"I couldn't breath\" past few days    HISTORY OF PRESENT ILLNESS:    77-year-old white female    Baseline does not wear home oxygen   Denies prior history of CHF, CAD or MI  Admits to chronic dyspnea on exertion at baseline  Does not walk much due to \"torn cartilage\" in my right knee   Family assists her ambulating around the house    Past several days increasing shortness of breath, any minor activity she gets very winded  Initially with exertion now even at rest  Shortness of breath worsens laying down, better sitting up  Associated dry cough  No fevers, sore throat, myalgias  Unknown if she has had any weight gain  Positive lower extremity edema  Mild substernal \"chest tightness\" when short of breath, radiates to left arm  Due to progressive CHOPRA, she came to ED    ED  RR to 33, O2 sats 77% RA  CXR with bilateral interstitial edema   pBNP 2,987  procalcitonin < 0.05  Currently on 4 liters  SARS-CoV-2 pending  IV lasix  We were called to admit the patient    Past Medical History:   Diagnosis Date    Diabetes ([facility] [address]     Hypertension     Ill-defined condition     high cholesterol    Ill-defined condition     tachycardia    Other and unspecified symptoms and signs involving general sensations and perceptions     ruptured disc        Social History     Tobacco Use    Smoking status: Former Smoker, quit 40 years ago   Substance Use Topics    Alcohol use: No    Lives with daughter, sister, GD    Family history:  1 daughter, low back problems  Father  with esophageal cancer  Mother  of CAD in early 66's    Allergies   Allergen Reactions    Other Plant, Animal, Environmental Rash     Ivory soap caused rash on hands. Prior to Admission medications    Medication Sig Start Date End Date Taking? Authorizing Provider   aspirin 81 mg chewable tablet Take 81 mg by mouth daily. Russ Cano MD   atorvastatin (LIPITOR) 10 mg tablet Take  by mouth daily. Russ Cano MD   furosemide (LASIX) 20 mg tablet Take 20 mg by mouth daily. Russ Cano MD   metoprolol (LOPRESSOR) 25 mg tablet Take 25 mg by mouth two (2) times a day.     Russ Cano MD       Review of symptoms:     POSITIVE= Bold  Negative = not bold  General:  fever, chills, sweats, generalized weakness, weight loss  Eyes:    blurred vision, eye pain, double vision  ENT:    Coryza, sore throat, trouble swallowing  Respiratory:   cough, sputum, SOB  Cardiology:   chest pain, orthopnea, PND, edema  Gastrointestinal:  abdominal pain , N/V, diarrhea, constipation, melena or BRBPR  Genitourinary:  Urgency, dysuria, hematuria  Muskuloskeletal :  Joint redness, swelling or acute joint pain, myalgias  Hematology:  easy bruising, nose or gum bleeding  Dermatological: rash, ulceration  Endocrine:   Polyuria or polydipsia, heat or hold intolerance  Neurological:  Headache, focal motor or sensory changes     Speech difficulties, memory loss  Psychological: depression, agitation      Objective:   VITALS:    Patient Vitals for the past 24 hrs:   Temp Pulse Resp BP SpO2   20 0539 97.6 °F (36.4 °C) 80 25 127/57 99 %   20 0307 97.8 °F (36.6 °C)       20 0304  81  149/67    20 0245  80 30 149/67 (!) 88 %   20 0230  82 26 154/81 91 %   20 0215  80 30 153/87 92 %   20 0200  85 28 141/71 90 %   20 0145  82 21 138/74 90 %   20 0130  79 (!) 34 145/72 91 %   20 0045  83 22 126/74 91 %   20 0040  80 29  (!) 89 %   20 0039  78 26  90 %   20 0038  80 22  90 %   20 0037  81 (!) 33  90 %   20 0004     97 %   20 2358 98.2 °F (36.8 °C) 79 24 167/90 97 %   20 2356 98.2 °F (36.8 °C) 79 26 167/90 96 %   20 2350     93 %   20 2349     (!) 89 %   20 2348     (!) 77 %     Temp (24hrs), Av °F (36.7 °C), Min:97.6 °F (36.4 °C), Max:98.2 °F (36.8 °C)    O2 Flow Rate (L/min): 4 l/min O2 Device: Nasal cannula    Wt Readings from Last 12 Encounters:   19 136.1 kg (300 lb)   19 136.1 kg (300 lb)   11/13/15 134.7 kg (297 lb)   03/17/15 140.1 kg (308 lb 13.8 oz)         PHYSICAL EXAM:   General:    Alert, cooperative in no distress     HEENT: Normocephalic, atraumatic    PERRL, Sclera no icterus    Nasal mucosa without masses or discharge  Hearing intact to voice    Oropharynx without erythema or exudate  Pink MM  Neck:  No meningismus, trachea midline, no carotid bruits Thyroid not enlarged, no nodules or tenderness  Lungs:   Few crackles at bases bilaterally. No wheezing    No accessory muscle use or retractions. Heart:   Regular rate and rhythm,  no murmur or gallop. 1+ LE edema    Decreased pedal pulses bilaterally  Abdomen:   Soft, non-tender. Not distended. Bowel sounds normal.     No masses, No Hepatosplenomegaly, No Rebound or guarding  Lymph nodes: No cervical or inguinal MONICA  Musculoskeletal:  No Joint swelling, erythema, warmth. No Cyanosis or clubbing  Skin:      Chronic venous stasis changes.       Not Jaundiced   No nodules or thickening    Capillary refill normal  Neurologic: Alert and oriented X 3, follows commands     Cranial nerves 2 to 12 intact    Symmetric motor strength bilaterally       LAB DATA REVIEWED:    Recent Results (from the past 12 hour(s))   EKG, 12 LEAD, INITIAL    Collection Time: 07/23/20 11:59 PM   Result Value Ref Range    Ventricular Rate 142 BPM    Atrial Rate 150 BPM    P-R Interval 156 ms    QRS Duration 68 ms    Q-T Interval 386 ms    QTC Calculation (Bezet) 593 ms    Calculated P Axis 68 degrees    Calculated R Axis 125 degrees    Calculated T Axis 83 degrees    Diagnosis       Undetermined rhythm  Left posterior fascicular block  Possible Inferior infarct , age undetermined  Cannot rule out Anterior infarct , age undetermined  When compared with ECG of 16-FEB-2010 09:11,  Current undetermined rhythm precludes rhythm comparison, needs review  Left posterior fascicular block is now present  Borderline criteria for Inferior infarct are now present  Nonspecific T wave abnormality now evident in Inferior leads     CBC WITH AUTOMATED DIFF    Collection Time: 07/24/20 12:16 AM   Result Value Ref Range    WBC 12.0 (H) 3.6 - 11.0 K/uL    RBC 4.77 3.80 - 5.20 M/uL    HGB 15.0 11.5 - 16.0 g/dL    HCT 51.3 (H) 35.0 - 47.0 %    .5 (H) 80.0 - 99.0 FL    MCH 31.4 26.0 - 34.0 PG    MCHC 29.2 (L) 30.0 - 36.5 g/dL    RDW 15.2 (H) 11.5 - 14.5 %    PLATELET 808 225 - 425 K/uL    MPV 12.6 8.9 - 12.9 FL    NRBC 0.0 0  WBC    ABSOLUTE NRBC 0.00 0.00 - 0.01 K/uL    NEUTROPHILS 78 (H) 32 - 75 %    LYMPHOCYTES 14 12 - 49 %    MONOCYTES 6 5 - 13 %    EOSINOPHILS 1 0 - 7 %    BASOPHILS 0 0 - 1 %    IMMATURE GRANULOCYTES 1 (H) 0.0 - 0.5 %    ABS. NEUTROPHILS 9.3 (H) 1.8 - 8.0 K/UL    ABS. LYMPHOCYTES 1.7 0.8 - 3.5 K/UL    ABS. MONOCYTES 0.8 0.0 - 1.0 K/UL    ABS. EOSINOPHILS 0.2 0.0 - 0.4 K/UL    ABS. BASOPHILS 0.0 0.0 - 0.1 K/UL    ABS. IMM. GRANS. 0.1 (H) 0.00 - 0.04 K/UL    DF AUTOMATED     METABOLIC PANEL, COMPREHENSIVE    Collection Time: 07/24/20 12:16 AM   Result Value Ref Range    Sodium 141 136 - 145 mmol/L    Potassium 5.1 3.5 - 5.1 mmol/L    Chloride 103 97 - 108 mmol/L    CO2 36 (H) 21 - 32 mmol/L    Anion gap 2 (L) 5 - 15 mmol/L    Glucose 145 (H) 65 - 100 mg/dL    BUN 25 (H) 6 - 20 MG/DL    Creatinine 0.99 0.55 - 1.02 MG/DL    BUN/Creatinine ratio 25 (H) 12 - 20      GFR est AA >60 >60 ml/min/1.73m2    GFR est non-AA 57 (L) >60 ml/min/1.73m2    Calcium 8.8 8.5 - 10.1 MG/DL    Bilirubin, total 0.7 0.2 - 1.0 MG/DL    ALT (SGPT) 25 12 - 78 U/L    AST (SGOT) 25 15 - 37 U/L    Alk.  phosphatase 88 45 - 117 U/L    Protein, total 7.9 6.4 - 8.2 g/dL    Albumin 3.1 (L) 3.5 - 5.0 g/dL    Globulin 4.8 (H) 2.0 - 4.0 g/dL    A-G Ratio 0.6 (L) 1.1 - 2.2     NT-PRO BNP    Collection Time: 07/24/20 12:16 AM   Result Value Ref Range    NT pro-BNP 2,987 (H) <125 PG/ML   TROPONIN I    Collection Time: 07/24/20 12:16 AM   Result Value Ref Range    Troponin-I, Qt. <0.05 <0.05 ng/mL   CK W/ REFLX CKMB    Collection Time: 07/24/20 12:16 AM   Result Value Ref Range    CK 39 26 - 192 U/L   MAGNESIUM    Collection Time: 07/24/20 12:16 AM   Result Value Ref Range    Magnesium 2.7 (H) 1.6 - 2.4 mg/dL   LACTIC ACID    Collection Time: 07/24/20  2:56 AM   Result Value Ref Range    Lactic acid 1.5 0.4 - 2.0 MMOL/L   SARS-COV-2    Collection Time: 07/24/20  2:56 AM   Result Value Ref Range    Specimen source Nasopharyngeal      SARS-CoV-2 PENDING     SARS-CoV-2 PENDING     Specimen source Nasopharyngeal      COVID-19 rapid test PENDING     COVID-19 PENDING NEG   LD    Collection Time: 07/24/20  2:56 AM   Result Value Ref Range     (H) 81 - 246 U/L   D DIMER    Collection Time: 07/24/20  2:56 AM   Result Value Ref Range    D-dimer 1.58 (H) 0.00 - 0.65 mg/L FEU   FERRITIN    Collection Time: 07/24/20  2:56 AM   Result Value Ref Range    Ferritin 44 26 - 388 NG/ML   FIBRINOGEN    Collection Time: 07/24/20  2:56 AM   Result Value Ref Range    Fibrinogen 414 200 - 475 mg/dL   PROCALCITONIN    Collection Time: 07/24/20  2:56 AM   Result Value Ref Range    Procalcitonin <0.05 ng/mL       CXR read by radiology and reviewed by myself results:  FINDINGS: A portable AP radiograph of the chest was obtained at 00:37 hours. The  patient is on a cardiac monitor. The examination is compromised by poor  penetration of patient habitus. There is an appearance of airspace infiltrate in  the perihilar lungs with pulmonary vascular congestion. No pneumothorax or  pleural effusion. The cardiac and mediastinal contours and pulmonary vascularity  are normal.  The bones and soft tissues are grossly within normal limits. IMPRESSION: Findings most compatible with diffuse increase in pulmonary edema  with pulmonary vascular congestion versus severe infectious infiltrate. Exam  compromised by poor penetration. I saw the patient personally, took a history and did a complete physical exam at the bedside. I performed complex decision making in coming up with a diagnostic and treatment plan for the patient. I reviewed the patient's past medical records, current laboratory and radiology results, and actual Xray films/EKG. I have also discussed this case with the involved ED physician.     Care Plan discussed with:    Patient, ED Doc    Risk of deterioration:  High    Total Time Coordinating Admission:  65 minutes    Total Critical Care Time:         Annabella Rosa MD

## 2020-07-25 ENCOUNTER — APPOINTMENT (OUTPATIENT)
Dept: GENERAL RADIOLOGY | Age: 60
DRG: 207 | End: 2020-07-25
Attending: INTERNAL MEDICINE
Payer: MEDICARE

## 2020-07-25 LAB
ANION GAP SERPL CALC-SCNC: 5 MMOL/L (ref 5–15)
ARTERIAL PATENCY WRIST A: YES
ATRIAL RATE: 150 BPM
BASE EXCESS BLD CALC-SCNC: 15 MMOL/L
BASOPHILS # BLD: 0 K/UL (ref 0–0.1)
BASOPHILS NFR BLD: 0 % (ref 0–1)
BDY SITE: ABNORMAL
BUN SERPL-MCNC: 27 MG/DL (ref 6–20)
BUN/CREAT SERPL: 26 (ref 12–20)
CA-I BLD-SCNC: 1.27 MMOL/L (ref 1.12–1.32)
CALCIUM SERPL-MCNC: 9.1 MG/DL (ref 8.5–10.1)
CALCULATED P AXIS, ECG09: 68 DEGREES
CALCULATED R AXIS, ECG10: 125 DEGREES
CALCULATED T AXIS, ECG11: 83 DEGREES
CHLORIDE SERPL-SCNC: 100 MMOL/L (ref 97–108)
CO2 SERPL-SCNC: 38 MMOL/L (ref 21–32)
CREAT SERPL-MCNC: 1.05 MG/DL (ref 0.55–1.02)
CRP SERPL-MCNC: 2.74 MG/DL (ref 0–0.6)
D DIMER PPP FEU-MCNC: 0.84 MG/L FEU (ref 0–0.65)
DIAGNOSIS, 93000: NORMAL
DIFFERENTIAL METHOD BLD: ABNORMAL
EOSINOPHIL # BLD: 0 K/UL (ref 0–0.4)
EOSINOPHIL NFR BLD: 0 % (ref 0–7)
ERYTHROCYTE [DISTWIDTH] IN BLOOD BY AUTOMATED COUNT: 15.1 % (ref 11.5–14.5)
FIBRINOGEN PPP-MCNC: 403 MG/DL (ref 200–475)
FLUID CULTURE, SPNG2: NORMAL
GAS FLOW.O2 O2 DELIVERY SYS: ABNORMAL L/MIN
GAS FLOW.O2 SETTING OXYMISER: 14 BPM
GLUCOSE BLD STRIP.AUTO-MCNC: 128 MG/DL (ref 65–100)
GLUCOSE BLD STRIP.AUTO-MCNC: 130 MG/DL (ref 65–100)
GLUCOSE BLD STRIP.AUTO-MCNC: 134 MG/DL (ref 65–100)
GLUCOSE BLD STRIP.AUTO-MCNC: 61 MG/DL (ref 65–100)
GLUCOSE SERPL-MCNC: 83 MG/DL (ref 65–100)
HCO3 BLD-SCNC: 38.9 MMOL/L (ref 22–26)
HCT VFR BLD AUTO: 45.2 % (ref 35–47)
HGB BLD-MCNC: 13.5 G/DL (ref 11.5–16)
IMM GRANULOCYTES # BLD AUTO: 0.1 K/UL (ref 0–0.04)
IMM GRANULOCYTES NFR BLD AUTO: 1 % (ref 0–0.5)
L PNEUMO1 AG UR QL IA: NEGATIVE
LYMPHOCYTES # BLD: 0.9 K/UL (ref 0.8–3.5)
LYMPHOCYTES NFR BLD: 9 % (ref 12–49)
MCH RBC QN AUTO: 31.3 PG (ref 26–34)
MCHC RBC AUTO-ENTMCNC: 29.9 G/DL (ref 30–36.5)
MCV RBC AUTO: 104.6 FL (ref 80–99)
MONOCYTES # BLD: 0.6 K/UL (ref 0–1)
MONOCYTES NFR BLD: 7 % (ref 5–13)
NEUTS SEG # BLD: 7.7 K/UL (ref 1.8–8)
NEUTS SEG NFR BLD: 83 % (ref 32–75)
NRBC # BLD: 0 K/UL (ref 0–0.01)
NRBC BLD-RTO: 0 PER 100 WBC
O2/TOTAL GAS SETTING VFR VENT: 60 %
ORGANISM ID, SPNG3: NORMAL
P-R INTERVAL, ECG05: 156 MS
PCO2 BLD: 55 MMHG (ref 35–45)
PEEP RESPIRATORY: 6 CMH2O
PH BLD: 7.46 [PH] (ref 7.35–7.45)
PLATELET # BLD AUTO: 142 K/UL (ref 150–400)
PLEASE NOTE, SPNG4: NORMAL
PMV BLD AUTO: 12.5 FL (ref 8.9–12.9)
PO2 BLD: 92 MMHG (ref 80–100)
POTASSIUM SERPL-SCNC: 3.9 MMOL/L (ref 3.5–5.1)
Q-T INTERVAL, ECG07: 386 MS
QRS DURATION, ECG06: 68 MS
QTC CALCULATION (BEZET), ECG08: 593 MS
RBC # BLD AUTO: 4.32 M/UL (ref 3.8–5.2)
S PNEUM AG SPEC QL LA: NEGATIVE
SAO2 % BLD: 97 % (ref 92–97)
SERVICE CMNT-IMP: ABNORMAL
SODIUM SERPL-SCNC: 143 MMOL/L (ref 136–145)
SPECIMEN SOURCE: NORMAL
SPECIMEN SOURCE: NORMAL
SPECIMEN TYPE: ABNORMAL
SPECIMEN, SPNG1: NORMAL
TOTAL RESP. RATE, ITRR: 14
TROPONIN I SERPL-MCNC: <0.05 NG/ML
VENTILATION MODE VENT: ABNORMAL
VENTRICULAR RATE, ECG03: 142 BPM
VT SETTING VENT: 480 ML
WBC # BLD AUTO: 9.3 K/UL (ref 3.6–11)

## 2020-07-25 PROCEDURE — 71045 X-RAY EXAM CHEST 1 VIEW: CPT

## 2020-07-25 PROCEDURE — 74018 RADEX ABDOMEN 1 VIEW: CPT

## 2020-07-25 PROCEDURE — 74011000258 HC RX REV CODE- 258: Performed by: INTERNAL MEDICINE

## 2020-07-25 PROCEDURE — 36415 COLL VENOUS BLD VENIPUNCTURE: CPT

## 2020-07-25 PROCEDURE — 87635 SARS-COV-2 COVID-19 AMP PRB: CPT

## 2020-07-25 PROCEDURE — 74011000250 HC RX REV CODE- 250: Performed by: INTERNAL MEDICINE

## 2020-07-25 PROCEDURE — 65610000006 HC RM INTENSIVE CARE

## 2020-07-25 PROCEDURE — 94003 VENT MGMT INPAT SUBQ DAY: CPT

## 2020-07-25 PROCEDURE — 85379 FIBRIN DEGRADATION QUANT: CPT

## 2020-07-25 PROCEDURE — 36600 WITHDRAWAL OF ARTERIAL BLOOD: CPT

## 2020-07-25 PROCEDURE — 74011250636 HC RX REV CODE- 250/636: Performed by: INTERNAL MEDICINE

## 2020-07-25 PROCEDURE — 85025 COMPLETE CBC W/AUTO DIFF WBC: CPT

## 2020-07-25 PROCEDURE — 74011250637 HC RX REV CODE- 250/637: Performed by: INTERNAL MEDICINE

## 2020-07-25 PROCEDURE — 74011250636 HC RX REV CODE- 250/636: Performed by: ANESTHESIOLOGY

## 2020-07-25 PROCEDURE — 82962 GLUCOSE BLOOD TEST: CPT

## 2020-07-25 PROCEDURE — 86140 C-REACTIVE PROTEIN: CPT

## 2020-07-25 PROCEDURE — 85384 FIBRINOGEN ACTIVITY: CPT

## 2020-07-25 PROCEDURE — 82803 BLOOD GASES ANY COMBINATION: CPT

## 2020-07-25 PROCEDURE — 84484 ASSAY OF TROPONIN QUANT: CPT

## 2020-07-25 PROCEDURE — 80048 BASIC METABOLIC PNL TOTAL CA: CPT

## 2020-07-25 RX ORDER — FUROSEMIDE 10 MG/ML
60 INJECTION INTRAMUSCULAR; INTRAVENOUS 2 TIMES DAILY
Status: DISCONTINUED | OUTPATIENT
Start: 2020-07-25 | End: 2020-07-26

## 2020-07-25 RX ORDER — DEXTROSE 50 % IN WATER (D50W) INTRAVENOUS SYRINGE
12.5-25 AS NEEDED
Status: DISCONTINUED | OUTPATIENT
Start: 2020-07-25 | End: 2020-08-07 | Stop reason: HOSPADM

## 2020-07-25 RX ORDER — DEXTROSE MONOHYDRATE 50 MG/ML
50 INJECTION, SOLUTION INTRAVENOUS CONTINUOUS
Status: DISCONTINUED | OUTPATIENT
Start: 2020-07-25 | End: 2020-07-29

## 2020-07-25 RX ORDER — MAGNESIUM SULFATE 100 %
4 CRYSTALS MISCELLANEOUS AS NEEDED
Status: DISCONTINUED | OUTPATIENT
Start: 2020-07-25 | End: 2020-08-07 | Stop reason: HOSPADM

## 2020-07-25 RX ORDER — INSULIN LISPRO 100 [IU]/ML
INJECTION, SOLUTION INTRAVENOUS; SUBCUTANEOUS EVERY 6 HOURS
Status: DISCONTINUED | OUTPATIENT
Start: 2020-07-25 | End: 2020-07-30

## 2020-07-25 RX ADMIN — PROPOFOL 50 MCG/KG/MIN: 10 INJECTION, EMULSION INTRAVENOUS at 00:51

## 2020-07-25 RX ADMIN — PROPOFOL 50 MCG/KG/MIN: 10 INJECTION, EMULSION INTRAVENOUS at 04:00

## 2020-07-25 RX ADMIN — Medication 200 MCG/HR: at 10:37

## 2020-07-25 RX ADMIN — Medication 200 MCG/HR: at 17:33

## 2020-07-25 RX ADMIN — DEXTROSE MONOHYDRATE 25 G: 25 INJECTION, SOLUTION INTRAVENOUS at 13:02

## 2020-07-25 RX ADMIN — FUROSEMIDE 60 MG: 10 INJECTION, SOLUTION INTRAMUSCULAR; INTRAVENOUS at 17:33

## 2020-07-25 RX ADMIN — PROPOFOL 50 MCG/KG/MIN: 10 INJECTION, EMULSION INTRAVENOUS at 09:23

## 2020-07-25 RX ADMIN — PROPOFOL 50 MCG/KG/MIN: 10 INJECTION, EMULSION INTRAVENOUS at 06:29

## 2020-07-25 RX ADMIN — Medication 50 MCG/HR: at 01:00

## 2020-07-25 RX ADMIN — ASPIRIN 81 MG CHEWABLE TABLET 81 MG: 81 TABLET CHEWABLE at 09:09

## 2020-07-25 RX ADMIN — Medication 10 ML: at 13:02

## 2020-07-25 RX ADMIN — SODIUM CHLORIDE 0.6 MCG/KG/HR: 9 INJECTION, SOLUTION INTRAVENOUS at 21:51

## 2020-07-25 RX ADMIN — SODIUM CHLORIDE 0.6 MCG/KG/HR: 9 INJECTION, SOLUTION INTRAVENOUS at 17:17

## 2020-07-25 RX ADMIN — ENOXAPARIN SODIUM 40 MG: 30 INJECTION SUBCUTANEOUS at 09:09

## 2020-07-25 RX ADMIN — NYSTATIN: 100000 POWDER TOPICAL at 09:23

## 2020-07-25 RX ADMIN — Medication 10 ML: at 21:52

## 2020-07-25 RX ADMIN — DEXTROSE MONOHYDRATE 50 ML/HR: 5 INJECTION, SOLUTION INTRAVENOUS at 14:09

## 2020-07-25 RX ADMIN — NYSTATIN: 100000 POWDER TOPICAL at 17:29

## 2020-07-25 RX ADMIN — SODIUM CHLORIDE 0.4 MCG/KG/HR: 9 INJECTION, SOLUTION INTRAVENOUS at 12:06

## 2020-07-25 NOTE — PROGRESS NOTES
Hospitalist Progress Note    NAME: Essence Alvarez   :  1960   MRN:  [id number]     Admit date: 2020    Today's date: 20    PCP: MOISE Knight M.D. Cell [phone]      Assessment / Plan:  Acute respiratory failure with hypoxia and hypercarbia POA  Probable acute on chronic left CHF POA  COVID-19 rule out, initial test negative  Metabolic encephalopathy due to hypercarbia   Underlying JOSLYN or OHS POA with Body mass index is 53.15 kg/m². Baseline does not wear home o2, admits to chronic CHOPRA  Past few days worsening CHOPRA and SOB at rest  Positive LE edema and orthopnea  No fevers, positive dry cough  HCO3 elevated at admit 36, suspect some chronic CO2 retention at baseline  CXR with bilateral interstitial edema   pBNP 2,987  procalcitonin < 0.05  Differential; Acute left CHF, atypical pneumonia              Bacterial pneumonia unlikely               ?  All superimposed on underlying JOSLYN or OHS  SARS-CoV-2 test negative, repeat pending  More lethargic evening , transferred to ICU  ABG 7.17, pCO2 > 90, pO2 88 on 15 liters  BIPAP then intubated last night  More awake after intubation, now sedated on propafol and fentanyl  IV lasix, - 1100 cc yesterday, increase to 60 mg  ASA, B-blocker  Serial cardiac enzymes negative for MI  No wheezing, held steroids and nebs  Hold antibiotics with no SIRS criteria, procalcitonin < 0.05  Check repeat SARS-CoV-2  Echo  Pulm consult     Essential HTN POA  Hyperlipidemia POA  Holding metoprolol and lipitor with intubation, borderline low BP  PRN labetalol  Check lipid panel     DM type 2 POA  Not currently on treatment  POC, SSI  Check HgBa1c     Remote tobacco use quit 40 years ago     Impaired mobility POA due to DJD knees  Not walking much at home, limited by knee pain     Fungal rash in trunk skin folds POA  Topical nystatin    Morbid obesity POA Body mass index is 53.15 kg/m².     Given the patient's current clinical presentation, I have a high level of concern for decompensation if discharged from the emergency department. My assessment of this patient's clinical condition and my plan of care is as noted above.     DVT prophylaxis with lovenox     Code status: full code  NOK: daughter     Subjective:     Chief Complaint / Reason for Physician Visit f/u acute respiratory failure  Intubated, sedated, not able to provide history  Progressive lethargy last PM, increased pCO2 and acidotic on ABG  Transferred to ICU, BIPAP started with no improvement, intubated last PM  Now sedated on propafol and fentanyl  NS reports she will arouse and follow commands when off sedation  Spoke with Daughter Arthur       She notes mom \"snores a bit\"      Increased daytime somnulence over the past month or so    Discussed with RN events overnight. Review of Systems:  Symptom Y/N Comments  Symptom Y/N Comments   Fever/Chills    Chest Pain     Poor Appetite    Edema     Cough    Abdominal Pain     Sputum    Joint Pain     SOB/CHOPRA    Headache     Nausea/vomit    Tolerating PT/OT     Diarrhea    Tolerating Diet     Constipation    Other       Could NOT obtain due to: Intubated, sedated     Objective:     VITALS:   Last 24hrs VS reviewed since prior progress note.  Most recent are:  Patient Vitals for the past 24 hrs:   Temp Pulse Resp BP SpO2   07/25/20 0700  64 14 (!) 85/43 97 %   07/25/20 0600 98 °F (36.7 °C) 65 14 91/42 98 %   07/25/20 0500  68 14 90/40 98 %   07/25/20 0405   14  96 %   07/25/20 0400  71 14 (!) 96/36 96 %   07/25/20 0300  70 14 122/48 96 %   07/25/20 0200 97.9 °F (36.6 °C) 67 14 93/49 97 %   07/25/20 0100  66 14 (!) 137/39 98 %   07/25/20 0000  69 17 (!) 134/97 95 %   07/24/20 2300  74 12 104/89 94 %   07/24/20 2233   20  100 %   07/24/20 2200  76 15 107/48 100 %   07/24/20 2100  73 16 115/61 100 %   07/24/20 2000 98.6 °F (37 °C) 71 16 103/51 100 %   07/24/20 1900  76 16 108/49 100 %   07/24/20 1830  79 16 100/46 93 %   07/24/20 1800  80 22 94/69 91 %   07/24/20 1746  82 26 121/48 93 %   07/24/20 1734 98.6 °F (37 °C) 83 16 121/48 94 %   07/24/20 1715     93 %   07/24/20 1710  83 28 93/42 91 %   07/24/20 1705  83 (!) 37 (!) 97/37 93 %   07/24/20 1701  83 27 (!) 94/29 91 %   07/24/20 1700  81 24 110/59 91 %   07/24/20 1656  82 22 116/45 91 %   07/24/20 1648 98.2 °F (36.8 °C) 82 24 116/45 92 %   07/24/20 1600  77  112/60 (!) 89 %   07/24/20 1540  79   95 %   07/24/20 1500 98 °F (36.7 °C) 80 22 136/67 92 %   07/24/20 1200 98 °F (36.7 °C) 77 22 131/64 95 %   07/24/20 0926 98 °F (36.7 °C) 80 22 133/59 100 %       Intake/Output Summary (Last 24 hours) at 7/25/2020 0801  Last data filed at 7/25/2020 0700  Gross per 24 hour   Intake 777.96 ml   Output 1845 ml   Net -1067.04 ml        Wt Readings from Last 12 Encounters:   07/24/20 136.1 kg (300 lb 0.7 oz)   12/27/19 136.1 kg (300 lb)   01/30/19 136.1 kg (300 lb)   11/13/15 134.7 kg (297 lb)   03/17/15 140.1 kg (308 lb 13.8 oz)       PHYSICAL EXAM:  General: WD, WN. Intubated, sedated  EENT:  PERRL. Anicteric sclerae. ETT in place  Resp:  Crackles at bases bilaterally, no wheezing. No accessory muscle use  CV:  Regular  rhythm,  1+ edema  GI:  Soft, Non distended, Non tender. +Bowel sounds, no rebound  LN:  No cervical or inguinal MONICA  Neurologic:  Intubate, sedated  Psych:    Not anxious nor agitated  Skin:  No rashes.   No jaundice    Reviewed most current lab test results and cultures  YES  Reviewed most current radiology test results   YES  Review and summation of old records today    NO  Reviewed patient's current orders and MAR    YES  PMH/SH reviewed - no change compared to H&P  ________________________________________________________________________  Care Plan discussed with:    Comments   Patient x    Family  x Daughter Arthur   RN x    Care Manager     Consultant                        Multidiciplinary team rounds were held today with , nursing, pharmacist and clinical coordinator. Patient's plan of care was discussed; medications were reviewed and discharge planning was addressed. ________________________________________________________________________      Comments   >50% of visit spent in counseling and coordination of care     ________________________________________________________________________  Kay Hidalgo MD     Procedures: see electronic medical records for all procedures/Xrays and details which were not copied into this note but were reviewed prior to creation of Plan. LABS:  I reviewed today's most current labs and imaging studies.   Pertinent labs include:  Recent Labs     07/25/20  0545 07/24/20  0016   WBC 9.3 12.0*   HGB 13.5 15.0   HCT 45.2 51.3*   * 210     Recent Labs     07/25/20  0545 07/24/20  0016    141   K 3.9 5.1    103   CO2 38* 36*   GLU 83 145*   BUN 27* 25*   CREA 1.05* 0.99   CA 9.1 8.8   MG  --  2.7*   ALB  --  3.1*   TBILI  --  0.7   ALT  --  25

## 2020-07-25 NOTE — PROGRESS NOTES
1900: Report received from Craig Padilla RN. Pt just arrived to the unit at 1700 HRS after a rapid response from [address]. Pt is on BiPAP currently. 2000: Shift assessment complete as noted. 2020: ABG drawn: pH: 7.204, PCO2: > 90, PO2: 83, iCa: 1.34.     2145: Paging [facility]. Dr. Armand Anthony.    2200: Dr Klaudia Ball advised to intubate her w/ [facility] settings 14/480/5/100% and repeat BG Advised to use Prop for sedation and can have Levo, keep MAP over 65.    2215: Dr Hallie Pennington at the bedside. 2222: 100 mg Propofol given    2223: Pt tubed, 24 at the lip. Good color change on ETCO2 and good breath sounds. Restraints initiated. 2236: Central line placed, R IJ. Ultrasound guided. Good blood return all 4 lumens. 2330: Petersen placed, immediately pt had output of 575cc, yellow urine. Pt had purewick earlier, output from that total was 300 cc.     2345: OGT dropped, air bolus heard. Gastric juices aspirated w/ piston. 0018: Paging [facility]. Dr Concepcion Seed: Dr Susie Louie called back advising order Fentanyl gtt for pt sedation. 0100: Fentanyl gtt started at 50 mcg    0145: RRT at bedside to do post intubation ABG. ABG was increasingly better.      0600: Labs drawn    0730: Report given to Diya Vásquez RN.       CVL:     White: NS [place] Cross: Clamped  Blue: Prop/Fent  Radha Grief: Clamped

## 2020-07-25 NOTE — CONSULTS
Pulmonology Consult - [facility]    Subjective:   Consult Note: 7/25/2020 12:07 PM    This patient has been seen and evaluated at the request of Dr. Belen Chen for respiratory failure and ventilator dependence. Patient is a 60 y.o. female   Presents with days history of severe progressive shortness of breath   Unable to give history intubated and sedated  Had elevated BNP and cxr suggesting pulmonary edema  Had progressive respiratory insufficiency and CO2 retention with hypersomnolence requiring intubation and transfer to ICU  Has history of tobacco -details unknown  Also history of asthma  CXR with cardiomegaly and pulmonary edema  Hypotensive on ventilator and sedation     Past Medical History:   Diagnosis Date    Diabetes ([facility] [address]     Hypertension     Ill-defined condition     high cholesterol    Ill-defined condition     tachycardia    Other and unspecified symptoms and signs involving general sensations and perceptions     ruptured disc    asthma   No past surgical history on file. Prior to Admission medications    Medication Sig Start Date End Date Taking? Authorizing Provider   aspirin 81 mg chewable tablet Take 81 mg by mouth daily. Russ Cano MD   atorvastatin (LIPITOR) 10 mg tablet Take  by mouth daily. Russ Cano MD   furosemide (LASIX) 20 mg tablet Take 20 mg by mouth daily. Russ Cano MD   metoprolol (LOPRESSOR) 25 mg tablet Take 25 mg by mouth two (2) times a day. Russ Cano MD     Allergies   Allergen Reactions    Other Plant, Animal, Environmental Rash     Ivory soap caused rash on hands. Social History     Tobacco Use    Smoking status: Former Smoker   Substance Use Topics    Alcohol use: No      No family history on file.      Current Facility-Administered Medications   Medication Dose Route Frequency    fentaNYL (PF) 1,500 mcg/30 mL (50 mcg/mL) infusion  0-200 mcg/hr IntraVENous CONTINUOUS    insulin lispro (HUMALOG) injection   SubCUTAneous Q6H  glucose chewable tablet 16 g  4 Tab Oral PRN    dextrose (D50W) injection syrg 12.5-25 g  12.5-25 g IntraVENous PRN    glucagon (GLUCAGEN) injection 1 mg  1 mg IntraMUSCular PRN    furosemide (LASIX) injection 60 mg  60 mg IntraVENous BID    dexmedeTOMidine (PRECEDEX) 400 mcg in 0.9% sodium chloride 100 mL infusion  0.2-1.4 mcg/kg/hr IntraVENous TITRATE    sodium chloride (NS) flush 5-40 mL  5-40 mL IntraVENous Q8H    sodium chloride (NS) flush 5-40 mL  5-40 mL IntraVENous PRN    acetaminophen (TYLENOL) tablet 650 mg  650 mg Oral Q6H PRN    Or    acetaminophen (TYLENOL) suppository 650 mg  650 mg Rectal Q6H PRN    polyethylene glycol (MIRALAX) packet 17 g  17 g Oral DAILY PRN    enoxaparin (LOVENOX) injection 40 mg  40 mg SubCUTAneous DAILY    ondansetron (ZOFRAN) injection 4 mg  4 mg IntraVENous Q6H PRN    aspirin chewable tablet 81 mg  81 mg Oral DAILY    [Held by provider] metoprolol tartrate (LOPRESSOR) tablet 25 mg  25 mg Oral BID    [Held by provider] atorvastatin (LIPITOR) tablet 10 mg  10 mg Oral DAILY    nystatin (MYCOSTATIN) 100,000 unit/gram powder   Topical BID    hydrALAZINE (APRESOLINE) 20 mg/mL injection 20 mg  20 mg IntraVENous Q6H PRN    NOREPINephrine (LEVOPHED) 8 mg in 5% dextrose 250mL (32 mcg/mL) infusion  0.5-16 mcg/min IntraVENous TITRATE    propofol (DIPRIVAN) 10 mg/mL infusion  0-50 mcg/kg/min IntraVENous TITRATE       Review of Systems:  Constitutional HEENT Cardiovascular Pulmonary Gastrointestinal Genitourinary Musculoskeletal Integument Neurologic Endocrinologic Hematologic ROS unobtainable    Objective:   Vital Signs:    Visit Vitals  BP (!) 88/44   Pulse 64   Temp 98.4 °F (36.9 °C)   Resp 14   Ht 5' 3\" (1.6 m)   Wt 150 kg (330 lb 11 oz)   SpO2 97%   BMI 58.58 kg/m²       O2 Device: Endotracheal tube, Ventilator   O2 Flow Rate (L/min): 15 l/min   Temp (24hrs), Av.2 °F (36.8 °C), Min:97.9 °F (36.6 °C), Max:98.6 °F (37 °C)       Intake/Output:   Last shift: 07/25 0701 - 07/25 1900  In: 147.2 [I.V.:147.2]  Out: -   Last 3 shifts: 07/23 1901 - 07/25 0700  In: 729 [P.O.:450; I.V.:328]  Out: 1845 [Urine:1845]    Intake/Output Summary (Last 24 hours) at 7/25/2020 1207  Last data filed at 7/25/2020 1030  Gross per 24 hour   Intake 475.19 ml   Output 1845 ml   Net -1369.81 ml       Physical Exam:   General:  Sedated in retraints   Head:  Normocephalic, atraumatic without obvious abnormality   Eyes:  Sclera non injected Conjunctivae. PERRL,    Nose: Nares normal. Septum midline. Mucosa normal. No drainage or sinus tenderness. Mouth: Moist mucus membranes ETT               Neck: Supple, symmetrical, trachea midline, no adenopathy   Back:   Not examined   Lungs:   No acessory muscle use, wheeze, rhonchi, rales       Heart:  Regular rate and rhythm, S1, S2 normal, no murmur, click, rub or gallop. Abdomen:   Soft, non-tender. obese No masses, tenderness, guarding rebound No organomegaly. Extremities: no cyanosis  ++ edema  -clubbing       Skin: Warm dry. Fungal type infection involving LE/toes   Lymph nodes: Cervical, supraclavicular, and axillary nodes normal.   Neurologic: Sedated on ventilator       Data Review     Recent Results (from the past 24 hour(s))   GLUCOSE, POC    Collection Time: 07/24/20  4:56 PM   Result Value Ref Range    Glucose (POC) 144 (H) 65 - 100 mg/dL    Performed by Ramón Gonzalez RN    POC EG7    Collection Time: 07/24/20  5:15 PM   Result Value Ref Range    Calcium, ionized (POC) 1.26 1.12 - 1.32 mmol/L    pH (POC) 7.17 (LL) 7.35 - 7.45      pCO2 (POC) >90.0 (H) 35.0 - 45.0 MMHG    pO2 (POC) 88 80 - 100 MMHG    Site LEFT RADIAL      Device: High Flow Nasal Cannula      Flow rate (POC) 15 L/M    Allens test (POC) YES      Specimen type (POC) ARTERIAL      Total resp.  rate 14     POC EG7    Collection Time: 07/24/20  8:18 PM   Result Value Ref Range    Calcium, ionized (POC) 1.34 (H) 1.12 - 1.32 mmol/L    FIO2 (POC) 60 %    pH (POC) 7.20 (LL) 7.35 - 7.45      pCO2 (POC) >90.0 (H) 35.0 - 45.0 MMHG    pO2 (POC) 83 80 - 100 MMHG    Site RIGHT RADIAL      Device: BIPAP      PEEP/CPAP (POC) 5 cmH2O    PIP (POC) 26      Allens test (POC) YES      Specimen type (POC) ARTERIAL      Total resp. rate 16     POC EG7    Collection Time: 07/25/20  1:51 AM   Result Value Ref Range    Calcium, ionized (POC) 1.27 1.12 - 1.32 mmol/L    FIO2 (POC) 60 %    pH (POC) 7.46 (H) 7.35 - 7.45      pCO2 (POC) 55.0 (H) 35.0 - 45.0 MMHG    pO2 (POC) 92 80 - 100 MMHG    HCO3 (POC) 38.9 (H) 22 - 26 MMOL/L    Base excess (POC) 15 mmol/L    sO2 (POC) 97 92 - 97 %    Site RIGHT RADIAL      Device: VENT      Mode ASSIST CONTROL      Tidal volume 480 ml    Set Rate 14 bpm    PEEP/CPAP (POC) 6 cmH2O    Allens test (POC) YES      Specimen type (POC) ARTERIAL      Total resp. rate 14     D DIMER    Collection Time: 07/25/20  5:45 AM   Result Value Ref Range    D-dimer 0.84 (H) 0.00 - 0.65 mg/L FEU   TROPONIN I    Collection Time: 07/25/20  5:45 AM   Result Value Ref Range    Troponin-I, Qt. <0.05 <0.05 ng/mL   CBC WITH AUTOMATED DIFF    Collection Time: 07/25/20  5:45 AM   Result Value Ref Range    WBC 9.3 3.6 - 11.0 K/uL    RBC 4.32 3.80 - 5.20 M/uL    HGB 13.5 11.5 - 16.0 g/dL    HCT 45.2 35.0 - 47.0 %    .6 (H) 80.0 - 99.0 FL    MCH 31.3 26.0 - 34.0 PG    MCHC 29.9 (L) 30.0 - 36.5 g/dL    RDW 15.1 (H) 11.5 - 14.5 %    PLATELET 243 (L) 153 - 400 K/uL    MPV 12.5 8.9 - 12.9 FL    NRBC 0.0 0  WBC    ABSOLUTE NRBC 0.00 0.00 - 0.01 K/uL    NEUTROPHILS 83 (H) 32 - 75 %    LYMPHOCYTES 9 (L) 12 - 49 %    MONOCYTES 7 5 - 13 %    EOSINOPHILS 0 0 - 7 %    BASOPHILS 0 0 - 1 %    IMMATURE GRANULOCYTES 1 (H) 0.0 - 0.5 %    ABS. NEUTROPHILS 7.7 1.8 - 8.0 K/UL    ABS. LYMPHOCYTES 0.9 0.8 - 3.5 K/UL    ABS. MONOCYTES 0.6 0.0 - 1.0 K/UL    ABS. EOSINOPHILS 0.0 0.0 - 0.4 K/UL    ABS. BASOPHILS 0.0 0.0 - 0.1 K/UL    ABS. IMM.  GRANS. 0.1 (H) 0.00 - 0.04 K/UL    DF AUTOMATED     METABOLIC PANEL, BASIC Collection Time: 07/25/20  5:45 AM   Result Value Ref Range    Sodium 143 136 - 145 mmol/L    Potassium 3.9 3.5 - 5.1 mmol/L    Chloride 100 97 - 108 mmol/L    CO2 38 (H) 21 - 32 mmol/L    Anion gap 5 5 - 15 mmol/L    Glucose 83 65 - 100 mg/dL    BUN 27 (H) 6 - 20 MG/DL    Creatinine 1.05 (H) 0.55 - 1.02 MG/DL    BUN/Creatinine ratio 26 (H) 12 - 20      GFR est AA >60 >60 ml/min/1.73m2    GFR est non-AA 53 (L) >60 ml/min/1.73m2    Calcium 9.1 8.5 - 10.1 MG/DL   C REACTIVE PROTEIN, QT    Collection Time: 07/25/20  5:45 AM   Result Value Ref Range    C-Reactive protein 2.74 (H) 0.00 - 0.60 mg/dL   FIBRINOGEN    Collection Time: 07/25/20  5:45 AM   Result Value Ref Range    Fibrinogen 403 200 - 475 mg/dL   SARS-COV-2    Collection Time: 07/25/20  9:43 AM   Result Value Ref Range    Specimen source Nasopharyngeal      SARS-CoV-2 PENDING     Specimen source Nasopharyngeal         Chest X-Ray:reviewed ETT CM pul,onary edema pattern    Assessment:   Active Problems:    COVID-19 ruled out (7/24/2020)        ·     Recommendations:     1. Acute respiratory failure  2. CHF ? Systolic/diastolic? 3. Hypotension ? Sedation related  4. Ventilator dependence  5. obesity  6. High suspicion JOSLYN  7. Hypercapnea  8. H/o tobacco   9.  Asthma hx        Full ventilator support  Diuretics as tolerated  Echo-may need definity if poor acoustic window  Follow up CXR  Vasopressors to MAP ~60-65  DVT ppx  ICU protocols  I/O  Outpatient PFT  Outpatient sleep study    Polina Leavitt MD

## 2020-07-25 NOTE — PROGRESS NOTES
Central Line Procedure Note    Indication: Inadequate venous access    . Patient positioned in Trendelenburg. 7-Step Sterility Protocol followed. (cap, mask sterile gown, sterile gloves, large sterile sheet, hand hygiene, 2% chlorhexidine for cutaneous antisepsis)  5 mL 1% Lidocaine placed at insertion site. Right internal jugular cannulated x 1 attempt(s) utilizing the Seldinger technique. Catheter secured & Biopatch applied. Sterile Tegaderm placed. CXR pending.     Care turned over to covering Attending MD.

## 2020-07-25 NOTE — PROGRESS NOTES
Received patient from night nurse. Patient vented and sedated, soft BP, sedation max'ed due to patient gumming at ETT and not pulling volumes and breathing over vent. Plan to wean sedation, titrate O2, hold lasix for until BP improves. Spoke with Dr. Bony Ling, ordered precedex for sedation.

## 2020-07-25 NOTE — PROGRESS NOTES
07/25/20 0543   ABCDEF Bundle   SBT Safety Screen Passed No   SBT Screen Reason for Failure FiO2 > 50%  (Pt intubated this PM)

## 2020-07-25 NOTE — PROGRESS NOTES
Intubation Note    Called to bedside secondary to  impending respiratory failure. Patient pre-oxygenated with 100% oxygen. Propofol 100 mg IV    glidescope x 1    7.5 ETT taped and secured at 22 cm at the teeth.    + Bilateral BS, + Chest rise, + ETCO2    CXR pending.     Care turned over to covering Attending MD.

## 2020-07-26 LAB
ALBUMIN SERPL-MCNC: 2.5 G/DL (ref 3.5–5)
ALBUMIN/GLOB SERPL: 0.6 {RATIO} (ref 1.1–2.2)
ALP SERPL-CCNC: 73 U/L (ref 45–117)
ALT SERPL-CCNC: 17 U/L (ref 12–78)
ANION GAP SERPL CALC-SCNC: 3 MMOL/L (ref 5–15)
APTT PPP: 27 SEC (ref 22.1–32)
AST SERPL-CCNC: 19 U/L (ref 15–37)
BASOPHILS # BLD: 0 K/UL (ref 0–0.1)
BASOPHILS NFR BLD: 0 % (ref 0–1)
BILIRUB SERPL-MCNC: 1.8 MG/DL (ref 0.2–1)
BUN SERPL-MCNC: 27 MG/DL (ref 6–20)
BUN/CREAT SERPL: 25 (ref 12–20)
CALCIUM SERPL-MCNC: 9.1 MG/DL (ref 8.5–10.1)
CHLORIDE SERPL-SCNC: 96 MMOL/L (ref 97–108)
CHOLEST SERPL-MCNC: 127 MG/DL
CO2 SERPL-SCNC: 42 MMOL/L (ref 21–32)
COMMENT, HOLDF: NORMAL
CREAT SERPL-MCNC: 1.1 MG/DL (ref 0.55–1.02)
D DIMER PPP FEU-MCNC: 0.84 MG/L FEU (ref 0–0.65)
DIFFERENTIAL METHOD BLD: ABNORMAL
EOSINOPHIL # BLD: 0.1 K/UL (ref 0–0.4)
EOSINOPHIL NFR BLD: 2 % (ref 0–7)
ERYTHROCYTE [DISTWIDTH] IN BLOOD BY AUTOMATED COUNT: 15.4 % (ref 11.5–14.5)
EST. AVERAGE GLUCOSE BLD GHB EST-MCNC: 126 MG/DL
FIBRINOGEN PPP-MCNC: 471 MG/DL (ref 200–475)
GLOBULIN SER CALC-MCNC: 4.5 G/DL (ref 2–4)
GLUCOSE BLD STRIP.AUTO-MCNC: 113 MG/DL (ref 65–100)
GLUCOSE BLD STRIP.AUTO-MCNC: 122 MG/DL (ref 65–100)
GLUCOSE BLD STRIP.AUTO-MCNC: 128 MG/DL (ref 65–100)
GLUCOSE BLD STRIP.AUTO-MCNC: 147 MG/DL (ref 65–100)
GLUCOSE BLD STRIP.AUTO-MCNC: 98 MG/DL (ref 65–100)
GLUCOSE SERPL-MCNC: 133 MG/DL (ref 65–100)
HBA1C MFR BLD: 6 % (ref 4–5.6)
HCT VFR BLD AUTO: 46.4 % (ref 35–47)
HDLC SERPL-MCNC: 41 MG/DL
HDLC SERPL: 3.1 {RATIO} (ref 0–5)
HGB BLD-MCNC: 14.1 G/DL (ref 11.5–16)
IMM GRANULOCYTES # BLD AUTO: 0 K/UL (ref 0–0.04)
IMM GRANULOCYTES NFR BLD AUTO: 1 % (ref 0–0.5)
INR PPP: 1.1 (ref 0.9–1.1)
LDLC SERPL CALC-MCNC: 55 MG/DL (ref 0–100)
LIPID PROFILE,FLP: ABNORMAL
LYMPHOCYTES # BLD: 0.9 K/UL (ref 0.8–3.5)
LYMPHOCYTES NFR BLD: 12 % (ref 12–49)
MCH RBC QN AUTO: 30.9 PG (ref 26–34)
MCHC RBC AUTO-ENTMCNC: 30.4 G/DL (ref 30–36.5)
MCV RBC AUTO: 101.5 FL (ref 80–99)
MONOCYTES # BLD: 0.4 K/UL (ref 0–1)
MONOCYTES NFR BLD: 5 % (ref 5–13)
NEUTS SEG # BLD: 6.5 K/UL (ref 1.8–8)
NEUTS SEG NFR BLD: 80 % (ref 32–75)
NRBC # BLD: 0 K/UL (ref 0–0.01)
NRBC BLD-RTO: 0 PER 100 WBC
PLATELET # BLD AUTO: 120 K/UL (ref 150–400)
PMV BLD AUTO: 11.8 FL (ref 8.9–12.9)
POTASSIUM SERPL-SCNC: 3.3 MMOL/L (ref 3.5–5.1)
PROT SERPL-MCNC: 7 G/DL (ref 6.4–8.2)
PROTHROMBIN TIME: 11.6 SEC (ref 9–11.1)
RBC # BLD AUTO: 4.57 M/UL (ref 3.8–5.2)
SAMPLES BEING HELD,HOLD: NORMAL
SARS-COV-2, COV2: NOT DETECTED
SERVICE CMNT-IMP: ABNORMAL
SERVICE CMNT-IMP: NORMAL
SODIUM SERPL-SCNC: 141 MMOL/L (ref 136–145)
SOURCE, COVRS: NORMAL
SPECIMEN SOURCE, FCOV2M: NORMAL
THERAPEUTIC RANGE,PTTT: NORMAL SECS (ref 58–77)
TRIGL SERPL-MCNC: 155 MG/DL (ref ?–150)
VLDLC SERPL CALC-MCNC: 31 MG/DL
WBC # BLD AUTO: 8 K/UL (ref 3.6–11)

## 2020-07-26 PROCEDURE — 74011250637 HC RX REV CODE- 250/637: Performed by: INTERNAL MEDICINE

## 2020-07-26 PROCEDURE — 83036 HEMOGLOBIN GLYCOSYLATED A1C: CPT

## 2020-07-26 PROCEDURE — 80053 COMPREHEN METABOLIC PANEL: CPT

## 2020-07-26 PROCEDURE — 85730 THROMBOPLASTIN TIME PARTIAL: CPT

## 2020-07-26 PROCEDURE — 85610 PROTHROMBIN TIME: CPT

## 2020-07-26 PROCEDURE — 74011000258 HC RX REV CODE- 258: Performed by: INTERNAL MEDICINE

## 2020-07-26 PROCEDURE — 65610000006 HC RM INTENSIVE CARE

## 2020-07-26 PROCEDURE — 80061 LIPID PANEL: CPT

## 2020-07-26 PROCEDURE — 85384 FIBRINOGEN ACTIVITY: CPT

## 2020-07-26 PROCEDURE — 74011636637 HC RX REV CODE- 636/637: Performed by: INTERNAL MEDICINE

## 2020-07-26 PROCEDURE — 94003 VENT MGMT INPAT SUBQ DAY: CPT

## 2020-07-26 PROCEDURE — 85379 FIBRIN DEGRADATION QUANT: CPT

## 2020-07-26 PROCEDURE — 36415 COLL VENOUS BLD VENIPUNCTURE: CPT

## 2020-07-26 PROCEDURE — 85025 COMPLETE CBC W/AUTO DIFF WBC: CPT

## 2020-07-26 PROCEDURE — 74011250636 HC RX REV CODE- 250/636: Performed by: INTERNAL MEDICINE

## 2020-07-26 PROCEDURE — 82962 GLUCOSE BLOOD TEST: CPT

## 2020-07-26 PROCEDURE — 74011000250 HC RX REV CODE- 250: Performed by: INTERNAL MEDICINE

## 2020-07-26 PROCEDURE — 74011250636 HC RX REV CODE- 250/636: Performed by: ANESTHESIOLOGY

## 2020-07-26 RX ORDER — FUROSEMIDE 10 MG/ML
60 INJECTION INTRAMUSCULAR; INTRAVENOUS DAILY
Status: DISCONTINUED | OUTPATIENT
Start: 2020-07-27 | End: 2020-07-27

## 2020-07-26 RX ADMIN — INSULIN LISPRO 2 UNITS: 100 INJECTION, SOLUTION INTRAVENOUS; SUBCUTANEOUS at 00:05

## 2020-07-26 RX ADMIN — SODIUM CHLORIDE 0.5 MCG/KG/HR: 9 INJECTION, SOLUTION INTRAVENOUS at 04:00

## 2020-07-26 RX ADMIN — Medication 10 ML: at 22:13

## 2020-07-26 RX ADMIN — ASPIRIN 81 MG CHEWABLE TABLET 81 MG: 81 TABLET CHEWABLE at 08:50

## 2020-07-26 RX ADMIN — PROPOFOL 30 MCG/KG/MIN: 10 INJECTION, EMULSION INTRAVENOUS at 04:00

## 2020-07-26 RX ADMIN — PROPOFOL 20 MCG/KG/MIN: 10 INJECTION, EMULSION INTRAVENOUS at 13:34

## 2020-07-26 RX ADMIN — Medication 10 ML: at 14:00

## 2020-07-26 RX ADMIN — Medication 10 ML: at 05:38

## 2020-07-26 RX ADMIN — POTASSIUM BICARBONATE 40 MEQ: 782 TABLET, EFFERVESCENT ORAL at 12:05

## 2020-07-26 RX ADMIN — Medication 100 MCG/HR: at 20:44

## 2020-07-26 RX ADMIN — FUROSEMIDE 60 MG: 10 INJECTION, SOLUTION INTRAMUSCULAR; INTRAVENOUS at 08:50

## 2020-07-26 RX ADMIN — ENOXAPARIN SODIUM 40 MG: 30 INJECTION SUBCUTANEOUS at 08:49

## 2020-07-26 RX ADMIN — NYSTATIN: 100000 POWDER TOPICAL at 08:50

## 2020-07-26 RX ADMIN — DEXTROSE MONOHYDRATE 50 ML/HR: 5 INJECTION, SOLUTION INTRAVENOUS at 08:49

## 2020-07-26 RX ADMIN — Medication 1 AMPULE: at 20:15

## 2020-07-26 RX ADMIN — Medication 10 ML: at 13:35

## 2020-07-26 RX ADMIN — PROPOFOL 20 MCG/KG/MIN: 10 INJECTION, EMULSION INTRAVENOUS at 20:10

## 2020-07-26 RX ADMIN — NYSTATIN: 100000 POWDER TOPICAL at 18:00

## 2020-07-26 RX ADMIN — SODIUM CHLORIDE 0.2 MCG/KG/HR: 9 INJECTION, SOLUTION INTRAVENOUS at 22:13

## 2020-07-26 RX ADMIN — Medication 100 MCG/HR: at 01:38

## 2020-07-26 NOTE — PROGRESS NOTES
07/26/20 0639   ABCDEF Bundle   SBT Safety Screen Passed Yes   SBT Trial Passed Yes   Weaning Parameters   Spontaneous Breathing Trial Complete Yes   Resp Rate Observed 25   Ve 5.9      RSBI 76

## 2020-07-26 NOTE — PROGRESS NOTES
Hospitalist Progress Note    NAME: Bryon Ayala   :  1960   MRN:  [id number]     Admit date: 2020    Today's date: 20    PCP: MOISE Montgomery M.D. Cell [phone]    Assessment / Plan:  Acute respiratory failure with hypoxia and hypercarbia POA  Probable acute on chronic left CHF POA  COVID-19 rule out POA initial test negative  Metabolic encephalopathy POA due to hypercarbia   Underlying JOSLYN or OHS POA with Body mass index is 58.58 kg/m². Baseline does not wear home o2, admits to chronic CHOPRA  Past few days worsening CHOPRA and SOB at rest  Positive LE edema and orthopnea  No fevers, positive dry cough  HCO3 elevated at admit 36, suspect some chronic CO2 retention at baseline  CXR with bilateral interstitial edema   pBNP 2,987  procalcitonin < 0.05  Differential; Acute left CHF, atypical pneumonia              Bacterial pneumonia unlikely               ?  All superimposed on underlying JOSLYN or OHS  SARS-CoV-2 test negative, repeat pending  More lethargic evening , transferred to ICU  ABG 7.17, pCO2 > 90, pO2 88 on 15 liters  BIPAP then intubated last night  More awake after intubation, now sedated on propafol and fentanyl  IV lasix, - 4000, HCO3 rising, reduce lasix to daily  ASA, B-blocker  Serial cardiac enzymes negative for MI  No wheezing, held steroids and nebs  Hold antibiotics with no SIRS criteria, procalcitonin < 0.05  Check repeat SARS-CoV-2  Echo  Pulm consult, failed SBT trial today    Thrombocytopenia  PLTs dropped from 210,000 to 120,000  Only after 2 doses of lovenox, unlikely POP  Recheck in AM  Normal coags, check fibrinogen     Essential HTN POA  Hyperlipidemia POA  Holding metoprolol and lipitor with intubation, borderline low BP  PRN labetalol  LDL 55,      DM type 2 POA  Not currently on treatment  POC, SSI  HgBa1c 6.0%     Remote tobacco use quit 40 years ago     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     Given the patient's current clinical presentation, I have a high level of concern for decompensation if discharged from the emergency department. My assessment of this patient's clinical condition and my plan of care is as noted above.     DVT prophylaxis with lovenox     Code status: full code  NOK: daughter     Subjective:     Chief Complaint / Reason for Physician Visit f/u acute respiratory failure  Intubated, sedated, not able to provide history  Failed SBT today  No other issues  Platelets dropping    Review of Systems:  Symptom Y/N Comments  Symptom Y/N Comments   Fever/Chills    Chest Pain     Poor Appetite    Edema     Cough    Abdominal Pain     Sputum    Joint Pain     SOB/CHOPRA    Headache     Nausea/vomit    Tolerating PT/OT     Diarrhea    Tolerating Diet     Constipation    Other       Could NOT obtain due to: Intubated, sedated     Objective:     VITALS:   Last 24hrs VS reviewed since prior progress note.  Most recent are:  Patient Vitals for the past 24 hrs:   Temp Pulse Resp BP SpO2   07/26/20 1140  68 12  96 %   07/26/20 1100  70 10 94/66 95 %   07/26/20 1000  77 12 97/42 94 %   07/26/20 0949  88 30  93 %   07/26/20 0900  87 26 122/65 93 %   07/26/20 0802  88 25  93 %   07/26/20 0800 99.2 °F (37.3 °C) 89 24 117/55 93 %   07/26/20 0700  89 15 110/48 92 %   07/26/20 0600  (!) 56 14 101/55 96 %   07/26/20 0500  (!) 59 14 94/49 96 %   07/26/20 0452  (!) 59 14  96 %   07/26/20 0400 98.1 °F (36.7 °C) (!) 57 14 117/53 95 %   07/26/20 0300  (!) 58 19 113/59 93 %   07/26/20 0200  (!) 57 16 113/59 95 %   07/26/20 0100  (!) 57 14 124/63 95 %   07/26/20 0000 98.2 °F (36.8 °C) (!) 57 18 123/57 95 %   07/25/20 2342  (!) 56 14  94 %   07/25/20 2300  (!) 57 14 124/60 94 %   07/25/20 2200  (!) 57 14 119/60 95 %   07/25/20 2100  (!) 56 14 118/62 95 %   07/25/20 2038  (!) 55 14  95 %   07/25/20 2000 98.8 °F (37.1 °C) (!) 57 17 112/56 95 %   07/25/20 1900  (!) 57 14 108/55 95 %   07/25/20 1800  (!) 56 14 111/59 95 %   07/25/20 1700  (!) 58 14 113/61 95 %   07/25/20 1600 97.6 °F (36.4 °C) (!) 58 15 112/56 95 %   07/25/20 1554  (!) 57 14  95 %       Intake/Output Summary (Last 24 hours) at 7/26/2020 1327  Last data filed at 7/26/2020 1205  Gross per 24 hour   Intake 1912.96 ml   Output 4795 ml   Net -2882.04 ml        Wt Readings from Last 12 Encounters:   07/25/20 150 kg (330 lb 11 oz)   12/27/19 136.1 kg (300 lb)   01/30/19 136.1 kg (300 lb)   11/13/15 134.7 kg (297 lb)   03/17/15 140.1 kg (308 lb 13.8 oz)       PHYSICAL EXAM:  General: WD, WN. Intubated, sedated  EENT:  PERRL. Anicteric sclerae. ETT in place  Resp:  Crackles at bases bilaterally, no wheezing. No accessory muscle use  CV:  Regular  rhythm,  1+ edema  GI:  Soft, Non distended, Non tender. +Bowel sounds, no rebound  LN:  No cervical or inguinal MONICA  Neurologic:  Intubate, sedated  Psych:    Not anxious nor agitated  Skin:  No rashes. No jaundice    Reviewed most current lab test results and cultures  YES  Reviewed most current radiology test results   YES  Review and summation of old records today    NO  Reviewed patient's current orders and MAR    YES  PMH/SH reviewed - no change compared to H&P  ________________________________________________________________________  Care Plan discussed with:    Comments   Patient x    Family      RN x    Care Manager     Consultant                        Multidiciplinary team rounds were held today with , nursing, pharmacist and clinical coordinator. Patient's plan of care was discussed; medications were reviewed and discharge planning was addressed.      ________________________________________________________________________      Comments   >50% of visit spent in counseling and coordination of care ________________________________________________________________________  Flower Stewart MD     Procedures: see electronic medical records for all procedures/Xrays and details which were not copied into this note but were reviewed prior to creation of Plan. LABS:  I reviewed today's most current labs and imaging studies.   Pertinent labs include:  Recent Labs     07/26/20  0534 07/25/20  0545 07/24/20  0016   WBC 8.0 9.3 12.0*   HGB 14.1 13.5 15.0   HCT 46.4 45.2 51.3*   * 142* 210     Recent Labs     07/26/20  0534 07/25/20  0545 07/24/20  0016    143 141   K 3.3* 3.9 5.1   CL 96* 100 103   CO2 42* 38* 36*   * 83 145*   BUN 27* 27* 25*   CREA 1.10* 1.05* 0.99   CA 9.1 9.1 8.8   MG  --   --  2.7*   ALB 2.5*  --  3.1*   TBILI 1.8*  --  0.7   ALT 17  --  25   INR 1.1  --   --

## 2020-07-26 NOTE — PROGRESS NOTES
PULMONARY/Critical Care/SLEEP MEDICINE  [facility]  Name: Sumanth Jaime   : 1960   MRN: [id number]   Date: 2020 9:33 AM       []      The patient is critically ill on      [x]      Mechanical ventilation []      Pressors   []      BiPAP []           Alert   Rapid shallow breathing on PSV 8/PP6                Vital Signs:    Visit Vitals  /55   Pulse 88   Temp 98.1 °F (36.7 °C)   Resp 25   Ht 5' 3\" (1.6 m)   Wt 150 kg (330 lb 11 oz)   SpO2 93%   BMI 58.58 kg/m²       O2 Device: Endotracheal tube, Ventilator   O2 Flow Rate (L/min): 15 l/min   Temp (24hrs), Av.1 °F (36.7 °C), Min:97.6 °F (36.4 °C), Max:98.8 °F (37.1 °C)       Intake/Output:   Last shift:      No intake/output data recorded.   Last 3 shifts: 1901 -  0700  In: 1951.6 [I.V.:1921.6]  Out: 5715 [Urine:5715]    Intake/Output Summary (Last 24 hours) at 2020 0933  Last data filed at 2020 0700  Gross per 24 hour   Intake 1534.05 ml   Output 3870 ml   Net -2335.95 ml     Hemodynamics:   PAP:     Wedge:     CVP:      CO:     CI:     SVR:     PVR:        Ventilator Settings:  Ventilator Mode: Spontaneous  Respiratory Rate  Resp Rate Observed: 25  Back-Up Rate: 14  Insp Flow (l/min): 60 l/min  I:E Ratio: 1:3.88  Ventilator Volumes  Vt Set (ml): 480 ml  Vt Exhaled (Machine Breath) (ml): 463 ml  Vt Spont (ml): 321 ml  Ve Observed (l/min): 8.03 l/min  Ventilator Pressures  Pressure Support (cm H2O): 6 cm H2O  PIP Observed (cm H2O): 14 cm H2O  Plateau Pressure (cm H2O): 27 cm H2O  MAP (cm H2O): 8.6  PEEP/VENT (cm H2O): 6 cm H20  Auto PEEP Observed (cm H2O): 0 cm H2O    Physical Exam:    General: [x]      Intubated/sedated []      No acute distress []          []      Ill appearing []       []            HEENT: [x]     ETT []      PERRL []     NGT     []      Anicteric Mucosa:[]      moist   []      dry []            Resp: [x]      Wheeze []      Clear to ascultation bilaterally []      Accessory muscle use    []      Rales []      Chest tube:  []      Air leak []          []      Ronchi []      Crepitus []         []      Coarse bilateral ventilator breath sounds      CV: [x]      Regular []      Tachycardia []          []      Irregular []      Bradycardic []          []      Murmur []      S1 []          []     RUB []    S2 []            GI: [x]      Soft  []      NG Tube Bowel sounds: []      present []      absent    []      Firm []      PEG []    Tender      []      Distended  []            : []      Petersen []      Hematuria []          []      Clear urine []       []            MSK:    []      SCDs []      Edema []   clubbing       []      No deformities []     Cyanosis  []            Skin: []      Warm [x]      Dry  []   Mottled       []      Cool []      Moist []          []     Lesions []      Diaphoretic []          []      Rash  []      Cyanosis []            N-psych: []      Sedated  []      Agitated []     Moves all extremities     [x]      Alert  Follows commands:   [x]      Yes  []      No []     oriented       Devices: []      PA Catheter []      Tracheostomy []          []      Central Line []        []        Chest tube:  Air leak? []        Y/[]        N    []          []      PICC []       []            DATA:   Current Facility-Administered Medications   Medication Dose Route Frequency    fentaNYL (PF) 1,500 mcg/30 mL (50 mcg/mL) infusion  0-200 mcg/hr IntraVENous CONTINUOUS    insulin lispro (HUMALOG) injection   SubCUTAneous Q6H    glucose chewable tablet 16 g  4 Tab Oral PRN    dextrose (D50W) injection syrg 12.5-25 g  12.5-25 g IntraVENous PRN    glucagon (GLUCAGEN) injection 1 mg  1 mg IntraMUSCular PRN    furosemide (LASIX) injection 60 mg  60 mg IntraVENous BID    dexmedeTOMidine (PRECEDEX) 400 mcg in 0.9% sodium chloride 100 mL infusion  0.2-1.4 mcg/kg/hr IntraVENous TITRATE    dextrose 5% infusion  50 mL/hr IntraVENous CONTINUOUS    sodium chloride (NS) flush 5-40 mL 5-40 mL IntraVENous Q8H    sodium chloride (NS) flush 5-40 mL  5-40 mL IntraVENous PRN    acetaminophen (TYLENOL) tablet 650 mg  650 mg Oral Q6H PRN    Or    acetaminophen (TYLENOL) suppository 650 mg  650 mg Rectal Q6H PRN    polyethylene glycol (MIRALAX) packet 17 g  17 g Oral DAILY PRN    enoxaparin (LOVENOX) injection 40 mg  40 mg SubCUTAneous DAILY    ondansetron (ZOFRAN) injection 4 mg  4 mg IntraVENous Q6H PRN    aspirin chewable tablet 81 mg  81 mg Oral DAILY    [Held by provider] metoprolol tartrate (LOPRESSOR) tablet 25 mg  25 mg Oral BID    [Held by provider] atorvastatin (LIPITOR) tablet 10 mg  10 mg Oral DAILY    nystatin (MYCOSTATIN) 100,000 unit/gram powder   Topical BID    hydrALAZINE (APRESOLINE) 20 mg/mL injection 20 mg  20 mg IntraVENous Q6H PRN    NOREPINephrine (LEVOPHED) 8 mg in 5% dextrose 250mL (32 mcg/mL) infusion  0.5-16 mcg/min IntraVENous TITRATE    propofol (DIPRIVAN) 10 mg/mL infusion  0-50 mcg/kg/min IntraVENous TITRATE       Telemetry: []        Sinus []        A-flutter []        Paced    []        A-fib []        Multiple PVCs                  Labs:  Recent Labs     07/26/20  0534 07/25/20  0545 07/24/20  0016   WBC 8.0 9.3 12.0*   HGB 14.1 13.5 15.0   HCT 46.4 45.2 51.3*   * 142* 210     Recent Labs     07/26/20  0534 07/25/20  0545 07/24/20  0016    143 141   K 3.3* 3.9 5.1   CL 96* 100 103   CO2 42* 38* 36*   * 83 145*   BUN 27* 27* 25*   CREA 1.10* 1.05* 0.99   CA 9.1 9.1 8.8   MG  --   --  2.7*   ALB 2.5*  --  3.1*   TBILI 1.8*  --  0.7   ALT 17  --  25   INR 1.1  --   --      No results for input(s): PH, PCO2, PO2, HCO3, FIO2 in the last 72 hours.     Imaging:  [x]      I have personally reviewed the patients radiographs     []      No change from prior, tubes and lines in adequate position  []      Improved   []      Worsening     IMPRESSION:     The patient is: []       acutely ill Risk of deterioration: []       moderate    [] critically ill  []       high     Active Problems:    COVID-19 ruled out (7/24/2020)      1. Acute respiratory failure  2. CHF ? Systolic/diastolic? 3. Hypotension ? Sedation related  4. Ventilator dependence  5. obesity  6. High suspicion JOSLYN  7. Hypercapnea  8. H/o tobacco   9.  Asthma hx           Full ventilator support-not ready to extubate  Diuretics as tolerated  Echo-may need definity if poor acoustic window  Follow up CXR  Vasopressors to MAP ~60-65  DVT ppx  ICU protocols  I/O  Outpatient PFT  Outpatient sleep study       ·   []      Total critical care time exclusive of procedures       minutes  Trace Ponce MD

## 2020-07-26 NOTE — PROGRESS NOTES
Problem: Falls - Risk of  Goal: *Absence of Falls  Description: [facility] and appropriate interventions in the flowsheet. Outcome: Progressing Towards Goal  Note: Fall Risk Interventions:  Mobility Interventions: Assess mobility with egress test, Communicate number of staff needed for ambulation/transfer, OT consult for ADLs, PT Consult for mobility concerns, PT Consult for assist device competence, Strengthening exercises (ROM-active/passive), Utilize walker, cane, or other assistive device, Utilize gait belt for transfers/ambulation    Mentation Interventions: Adequate sleep, hydration, pain control, Evaluate medications/consider consulting pharmacy, Increase mobility, More frequent rounding, Reorient patient, Room close to nurse's station, Toileting rounds, Update white board    Medication Interventions: Evaluate medications/consider consulting pharmacy    Elimination Interventions: Call light in reach, Toileting schedule/hourly rounds              Problem: Patient Education: Go to Patient Education Activity  Goal: Patient/Family Education  Outcome: Progressing Towards Goal     Problem: Pressure Injury - Risk of  Goal: *Prevention of pressure injury  Description: Document Gaston Scale and appropriate interventions in the flowsheet. Outcome: Progressing Towards Goal  Note: Pressure Injury Interventions:  Sensory Interventions: Assess changes in LOC, Check visual cues for pain, Float heels, Keep linens dry and wrinkle-free, Minimize linen layers, Pad between skin to skin, Pressure redistribution bed/mattress (bed type), Turn and reposition approx.  every two hours (pillows and wedges if needed)    Moisture Interventions: Absorbent underpads, Internal/External urinary devices, Minimize layers    Activity Interventions: Pressure redistribution bed/mattress(bed type)    Mobility Interventions: Float heels, HOB 30 degrees or less, Pressure redistribution bed/mattress (bed type), Turn and reposition approx.  every two hours(pillow and wedges)    Nutrition Interventions: Discuss nutritional consult with provider    Friction and Shear Interventions: Apply protective barrier, creams and emollients, HOB 30 degrees or less, Minimize layers, Transferring/repositioning devices                Problem: Patient Education: Go to Patient Education Activity  Goal: Patient/Family Education  Outcome: Progressing Towards Goal     Problem: Breathing Pattern - Ineffective  Goal: *Absence of hypoxia  Outcome: Progressing Towards Goal  Goal: *Use of effective breathing techniques  Outcome: Progressing Towards Goal     Problem: Ventilator Management  Goal: *Adequate oxygenation and ventilation  Outcome: Progressing Towards Goal  Goal: *Patient maintains clear airway/free of aspiration  Outcome: Progressing Towards Goal  Goal: *Absence of infection signs and symptoms  Outcome: Progressing Towards Goal  Goal: *Normal spontaneous ventilation  Outcome: Progressing Towards Goal     Problem: Patient Education: Go to Patient Education Activity  Goal: Patient/Family Education  Outcome: Progressing Towards Goal     Problem: Non-Violent Restraints  Goal: *Removal from restraints as soon as assessed to be safe  Outcome: Progressing Towards Goal  Goal: *No harm/injury to patient while restraints in use  Outcome: Progressing Towards Goal  Goal: *Patient's dignity will be maintained  Outcome: Progressing Towards Goal  Goal: *Patient Specific Goal (EDIT GOAL, INSERT TEXT)  Outcome: Progressing Towards Goal  Goal: Non-violent Restaints:Standard Interventions  Outcome: Progressing Towards Goal  Goal: Non-violent Restraints:Patient Interventions  Outcome: Progressing Towards Goal  Goal: Patient/Family Education  Outcome: Progressing Towards Goal

## 2020-07-26 NOTE — PROGRESS NOTES
0730: Report received from Jennifer Hdz, [facility]. Pt. currently on a SAT/SBT and doing well. Propofol, fentanyl, and precedx paused at this time. 0800: Pt. Assessed. Pt. Intubated and slightly sedated at this time. Pt. Moves all extremities. Follows all commands. Pt. Has a positive gag and cough. Pt. In restraints for patient safety. Pupils equal and reactive. Breath sounds coarse, wheezing present. Pt. Has large amounts of blood tinged sputum from inline suction. Lavage required to clear ETT. Vent setting unchanged from previous shifts. AC Rate 14, PEEP 6, Oxygen 50%. Bowel sounds hypoactive. Pt. NPO a this time. Pulses all palpable. Blood pressure stable. Slight Swelling to extremities. Flaky skin to legs and feet. Shaan BLE's. Pt.'s feet are red, swollen, and skin is flaking/sloughing off. Pt. Has a skin tear and purple area to her BL buttocks and gluteal crack. Pt. Also has massive discoloration and excoriation to all folds. Some folds open and weeping. Powder applied. Will turn patient Q2 hours and float feet and arms. Pt. Has an ETT, OGT, Petersen, Right IJ CVC, and PIV. D5% infusing  At 50 ml/hr. 1000: Pt. Placed back on a rate per Dr. Tomas Lemus. Pt. Failed her SBT for a RR in the 40's continuously. Per Dr. Tomas Lemus give patient 36 Meq of potassium chloride now PO. Propofol now restarted at 30 mcgs, Fentanyl restarted at 100 mcgs. Keeping precedex off for now. 1205: Pt. Reassessed. No real changes. Pt. Resting comfortably. Pt. Slightly hypotensive. Weaning propofol to 20 mcgs. Will start Levophed if needed. 1334: Pt. Is awake pulling on her restraints. She denies pain/dyspnea. Precedex now started at 0.2 mcgs for adequate sedation. BP 97/56.     1400: Pt.'s second COVID test came back negative. Spoke to Dr. Tuan Goodwin regarding this. Received verbal order to D/C all isolation. 1600: Pt. Reassessed. Pt. Now hypotensive again. Responds very easily to voice and touch.  Will start a very low dose Levophed for adequate BP for adequate sedation. No other changes to assessment. 1800: Pt. Resting comfortably. Levophed on standby for now. Hypotension improved, SBP greater than 90.     1900: Report given to Guru Lucio.

## 2020-07-27 ENCOUNTER — APPOINTMENT (OUTPATIENT)
Dept: GENERAL RADIOLOGY | Age: 60
DRG: 207 | End: 2020-07-27
Attending: INTERNAL MEDICINE
Payer: MEDICARE

## 2020-07-27 ENCOUNTER — APPOINTMENT (OUTPATIENT)
Dept: NON INVASIVE DIAGNOSTICS | Age: 60
DRG: 207 | End: 2020-07-27
Attending: INTERNAL MEDICINE
Payer: MEDICARE

## 2020-07-27 LAB
ALBUMIN SERPL-MCNC: 2.3 G/DL (ref 3.5–5)
ALBUMIN/GLOB SERPL: 0.5 {RATIO} (ref 1.1–2.2)
ALP SERPL-CCNC: 71 U/L (ref 45–117)
ALT SERPL-CCNC: 16 U/L (ref 12–78)
ANION GAP SERPL CALC-SCNC: 4 MMOL/L (ref 5–15)
AST SERPL-CCNC: 23 U/L (ref 15–37)
BASOPHILS # BLD: 0 K/UL (ref 0–0.1)
BASOPHILS NFR BLD: 0 % (ref 0–1)
BILIRUB SERPL-MCNC: 2.2 MG/DL (ref 0.2–1)
BUN SERPL-MCNC: 25 MG/DL (ref 6–20)
BUN/CREAT SERPL: 24 (ref 12–20)
CALCIUM SERPL-MCNC: 8.5 MG/DL (ref 8.5–10.1)
CHLORIDE SERPL-SCNC: 93 MMOL/L (ref 97–108)
CO2 SERPL-SCNC: 41 MMOL/L (ref 21–32)
CREAT SERPL-MCNC: 1.03 MG/DL (ref 0.55–1.02)
D DIMER PPP FEU-MCNC: 0.7 MG/L FEU (ref 0–0.65)
DIFFERENTIAL METHOD BLD: ABNORMAL
ECHO AO ROOT DIAM: 2.74 CM
ECHO AV CUSP MM: 1.95 CM
ECHO LA MAJOR AXIS: 3.45 CM
ECHO LA MINOR AXIS: 1.5 CM
ECHO LA TO AORTIC ROOT RATIO: 1.01
ECHO LA TO AORTIC ROOT RATIO: 1.01
ECHO LV E' LATERAL VELOCITY: 5.03 CM/S
ECHO LV E' SEPTAL VELOCITY: 4.37 CM/S
ECHO LV EDV A2C: 110.18 ML
ECHO LV EDV A4C: 74.89 ML
ECHO LV EDV BP: 90.25 ML (ref 56–104)
ECHO LV EDV INDEX A4C: 32.6 ML/M2
ECHO LV EDV INDEX BP: 39.3 ML/M2
ECHO LV EDV NDEX A2C: 48 ML/M2
ECHO LV EJECTION FRACTION A2C: 45 PERCENT
ECHO LV EJECTION FRACTION A4C: 35 PERCENT
ECHO LV EJECTION FRACTION BIPLANE: 37.9 PERCENT (ref 55–100)
ECHO LV ESV A2C: 60.82 ML
ECHO LV ESV A4C: 48.78 ML
ECHO LV ESV BP: 56.04 ML (ref 19–49)
ECHO LV ESV INDEX A2C: 26.5 ML/M2
ECHO LV ESV INDEX A4C: 21.2 ML/M2
ECHO LV ESV INDEX BP: 24.4 ML/M2
ECHO LV INTERNAL DIMENSION DIASTOLIC: 4.11 CM (ref 3.9–5.3)
ECHO LV INTERNAL DIMENSION SYSTOLIC: 2.86 CM
ECHO LV IVSD: 1.1 CM (ref 0.6–0.9)
ECHO LV IVSS: 1.66 CM
ECHO LV MASS 2D: 179.9 G (ref 67–162)
ECHO LV MASS INDEX 2D: 78.3 G/M2 (ref 43–95)
ECHO LV POSTERIOR WALL DIASTOLIC: 1.37 CM (ref 0.6–0.9)
ECHO LV POSTERIOR WALL SYSTOLIC: 1.6 CM
ECHO LVOT DIAM: 2.13 CM
ECHO MV A VELOCITY: 53.66 CM/S
ECHO MV E DECELERATION TIME (DT): 0.11 S
ECHO MV E VELOCITY: 52.81 CM/S
ECHO MV E/A RATIO: 0.98
ECHO MV E/E' LATERAL: 10.5
ECHO MV E/E' RATIO (AVERAGED): 11.29
ECHO MV E/E' SEPTAL: 12.08
ECHO PV MAX VELOCITY: 91 CM/S
ECHO PV PEAK INSTANTANEOUS GRADIENT SYSTOLIC: 3.31 MMHG
ECHO PV REGURGITANT END DIASTOLIC MAX VELOCITY: 94.05 CM/S
ECHO RV INTERNAL DIMENSION: 3.57 CM
EOSINOPHIL # BLD: 0.2 K/UL (ref 0–0.4)
EOSINOPHIL NFR BLD: 2 % (ref 0–7)
ERYTHROCYTE [DISTWIDTH] IN BLOOD BY AUTOMATED COUNT: 15.4 % (ref 11.5–14.5)
FIBRINOGEN PPP-MCNC: 513 MG/DL (ref 200–475)
GLOBULIN SER CALC-MCNC: 4.5 G/DL (ref 2–4)
GLUCOSE BLD STRIP.AUTO-MCNC: 116 MG/DL (ref 65–100)
GLUCOSE BLD STRIP.AUTO-MCNC: 119 MG/DL (ref 65–100)
GLUCOSE BLD STRIP.AUTO-MCNC: 119 MG/DL (ref 65–100)
GLUCOSE BLD STRIP.AUTO-MCNC: 131 MG/DL (ref 65–100)
GLUCOSE SERPL-MCNC: 93 MG/DL (ref 65–100)
HCT VFR BLD AUTO: 44.2 % (ref 35–47)
HGB BLD-MCNC: 13.6 G/DL (ref 11.5–16)
IMM GRANULOCYTES # BLD AUTO: 0 K/UL (ref 0–0.04)
IMM GRANULOCYTES NFR BLD AUTO: 0 % (ref 0–0.5)
LACTATE SERPL-SCNC: 1.5 MMOL/L (ref 0.4–2)
LYMPHOCYTES # BLD: 1.5 K/UL (ref 0.8–3.5)
LYMPHOCYTES NFR BLD: 17 % (ref 12–49)
MCH RBC QN AUTO: 30.8 PG (ref 26–34)
MCHC RBC AUTO-ENTMCNC: 30.8 G/DL (ref 30–36.5)
MCV RBC AUTO: 100.2 FL (ref 80–99)
MONOCYTES # BLD: 0.5 K/UL (ref 0–1)
MONOCYTES NFR BLD: 5 % (ref 5–13)
NEUTS SEG # BLD: 6.9 K/UL (ref 1.8–8)
NEUTS SEG NFR BLD: 76 % (ref 32–75)
NRBC # BLD: 0 K/UL (ref 0–0.01)
NRBC BLD-RTO: 0 PER 100 WBC
PLATELET # BLD AUTO: 128 K/UL (ref 150–400)
PMV BLD AUTO: 12 FL (ref 8.9–12.9)
POTASSIUM SERPL-SCNC: 3.2 MMOL/L (ref 3.5–5.1)
PROCALCITONIN SERPL-MCNC: 0.29 NG/ML
PROT SERPL-MCNC: 6.8 G/DL (ref 6.4–8.2)
RBC # BLD AUTO: 4.41 M/UL (ref 3.8–5.2)
SERVICE CMNT-IMP: ABNORMAL
SODIUM SERPL-SCNC: 138 MMOL/L (ref 136–145)
WBC # BLD AUTO: 9.2 K/UL (ref 3.6–11)

## 2020-07-27 PROCEDURE — 74011250637 HC RX REV CODE- 250/637: Performed by: INTERNAL MEDICINE

## 2020-07-27 PROCEDURE — 74011000250 HC RX REV CODE- 250: Performed by: INTERNAL MEDICINE

## 2020-07-27 PROCEDURE — 97530 THERAPEUTIC ACTIVITIES: CPT

## 2020-07-27 PROCEDURE — 97165 OT EVAL LOW COMPLEX 30 MIN: CPT

## 2020-07-27 PROCEDURE — 85379 FIBRIN DEGRADATION QUANT: CPT

## 2020-07-27 PROCEDURE — 83605 ASSAY OF LACTIC ACID: CPT

## 2020-07-27 PROCEDURE — C8929 TTE W OR WO FOL WCON,DOPPLER: HCPCS

## 2020-07-27 PROCEDURE — 77030013797 HC KT TRNSDUC PRSSR EDWD -A

## 2020-07-27 PROCEDURE — 85384 FIBRINOGEN ACTIVITY: CPT

## 2020-07-27 PROCEDURE — 87077 CULTURE AEROBIC IDENTIFY: CPT

## 2020-07-27 PROCEDURE — 74011000258 HC RX REV CODE- 258: Performed by: INTERNAL MEDICINE

## 2020-07-27 PROCEDURE — 84145 PROCALCITONIN (PCT): CPT

## 2020-07-27 PROCEDURE — 74011250636 HC RX REV CODE- 250/636: Performed by: INTERNAL MEDICINE

## 2020-07-27 PROCEDURE — 85025 COMPLETE CBC W/AUTO DIFF WBC: CPT

## 2020-07-27 PROCEDURE — 87040 BLOOD CULTURE FOR BACTERIA: CPT

## 2020-07-27 PROCEDURE — 71045 X-RAY EXAM CHEST 1 VIEW: CPT

## 2020-07-27 PROCEDURE — 82962 GLUCOSE BLOOD TEST: CPT

## 2020-07-27 PROCEDURE — 77030040361 HC SLV COMPR DVT MDII -B

## 2020-07-27 PROCEDURE — 97535 SELF CARE MNGMENT TRAINING: CPT

## 2020-07-27 PROCEDURE — 97162 PT EVAL MOD COMPLEX 30 MIN: CPT

## 2020-07-27 PROCEDURE — 87186 SC STD MICRODIL/AGAR DIL: CPT

## 2020-07-27 PROCEDURE — 94003 VENT MGMT INPAT SUBQ DAY: CPT

## 2020-07-27 PROCEDURE — 80053 COMPREHEN METABOLIC PANEL: CPT

## 2020-07-27 PROCEDURE — 36415 COLL VENOUS BLD VENIPUNCTURE: CPT

## 2020-07-27 PROCEDURE — 65610000006 HC RM INTENSIVE CARE

## 2020-07-27 PROCEDURE — 87070 CULTURE OTHR SPECIMN AEROBIC: CPT

## 2020-07-27 RX ORDER — CHLORHEXIDINE GLUCONATE 0.12 MG/ML
15 RINSE ORAL EVERY 12 HOURS
Status: DISCONTINUED | OUTPATIENT
Start: 2020-07-27 | End: 2020-08-01

## 2020-07-27 RX ORDER — BALSAM PERU/CASTOR OIL
OINTMENT (GRAM) TOPICAL EVERY 12 HOURS
Status: DISCONTINUED | OUTPATIENT
Start: 2020-07-27 | End: 2020-08-07 | Stop reason: HOSPADM

## 2020-07-27 RX ADMIN — DEXTROSE MONOHYDRATE 50 ML/HR: 5 INJECTION, SOLUTION INTRAVENOUS at 06:02

## 2020-07-27 RX ADMIN — Medication 100 MCG/HR: at 12:23

## 2020-07-27 RX ADMIN — CEFEPIME HYDROCHLORIDE 2 G: 2 INJECTION, POWDER, FOR SOLUTION INTRAVENOUS at 12:30

## 2020-07-27 RX ADMIN — FAMOTIDINE 20 MG: 10 INJECTION, SOLUTION INTRAVENOUS at 20:31

## 2020-07-27 RX ADMIN — POTASSIUM BICARBONATE 40 MEQ: 782 TABLET, EFFERVESCENT ORAL at 11:15

## 2020-07-27 RX ADMIN — Medication 1 AMPULE: at 20:31

## 2020-07-27 RX ADMIN — ACETAMINOPHEN 650 MG: 325 TABLET ORAL at 20:38

## 2020-07-27 RX ADMIN — Medication 20 ML: at 21:17

## 2020-07-27 RX ADMIN — CHLORHEXIDINE GLUCONATE 15 ML: 0.12 RINSE ORAL at 20:31

## 2020-07-27 RX ADMIN — PERFLUTREN 2 ML: 6.52 INJECTION, SUSPENSION INTRAVENOUS at 09:31

## 2020-07-27 RX ADMIN — Medication 10 ML: at 06:00

## 2020-07-27 RX ADMIN — WATER 500 MG: 1 INJECTION INTRAMUSCULAR; INTRAVENOUS; SUBCUTANEOUS at 13:52

## 2020-07-27 RX ADMIN — Medication 40 ML: at 08:37

## 2020-07-27 RX ADMIN — Medication 40 ML: at 13:52

## 2020-07-27 RX ADMIN — NYSTATIN: 100000 POWDER TOPICAL at 18:00

## 2020-07-27 RX ADMIN — SODIUM CHLORIDE 0.4 MCG/KG/HR: 9 INJECTION, SOLUTION INTRAVENOUS at 10:36

## 2020-07-27 RX ADMIN — POTASSIUM BICARBONATE 40 MEQ: 782 TABLET, EFFERVESCENT ORAL at 18:23

## 2020-07-27 RX ADMIN — Medication 1 AMPULE: at 09:05

## 2020-07-27 RX ADMIN — NYSTATIN: 100000 POWDER TOPICAL at 09:06

## 2020-07-27 RX ADMIN — FAMOTIDINE 20 MG: 10 INJECTION, SOLUTION INTRAVENOUS at 11:16

## 2020-07-27 RX ADMIN — CHLORHEXIDINE GLUCONATE 15 ML: 0.12 RINSE ORAL at 09:54

## 2020-07-27 RX ADMIN — ASPIRIN 81 MG CHEWABLE TABLET 81 MG: 81 TABLET CHEWABLE at 09:05

## 2020-07-27 RX ADMIN — SODIUM CHLORIDE 0.3 MCG/KG/HR: 9 INJECTION, SOLUTION INTRAVENOUS at 17:59

## 2020-07-27 RX ADMIN — CEFEPIME HYDROCHLORIDE 2 G: 2 INJECTION, POWDER, FOR SOLUTION INTRAVENOUS at 23:33

## 2020-07-27 RX ADMIN — CASTOR OIL AND BALSAM, PERU: 788; 87 OINTMENT TOPICAL at 17:59

## 2020-07-27 NOTE — PROGRESS NOTES
Problem: Self Care Deficits Care Plan (Adult)  Goal: *Acute Goals and Plan of Care (Insert Text)  Description:   FUNCTIONAL STATUS PRIOR TO ADMISSION: Mod I to Setup A with performing basic ADLs using SPC and leaning on furniture PRN. Sister sets up bucket for Pt to sponge bathe while seated. Mod I with all aspects of toileting. Mostly walks barefoot in her home and doesn't wear socks. Says she can don/doff underpants w/o assist. Does not drive. Admits to x1 fall in past year. HOME SUPPORT: Lives with daughter and sister who provide Setup A with some ADLs and manage all IADLs. Occupational Therapy Goals  Initiated 7/27/2020  1. Patient will perform sponge bathing seated with Setup Assist within 7 day(s). 2.  Patient will perform upper body dressing with independence within 7 day(s). 3.  Patient will perform lower body dressing with Min A within 7 day(s). 4.  Patient will perform toilet transfers with CGA using least restrictive device within 7 day(s). 5.  Patient will perform all aspects of toileting with CGA within 7 day(s). Outcome: Not Met   OCCUPATIONAL THERAPY EVALUATION  Patient: Grayson Reddy (11 y.o. female)  Date: 7/27/2020  Primary Diagnosis: COVID-19 ruled out [Z03.818]        Precautions:    Fall Risk    ASSESSMENT  Based on the objective data described below, the patient presents with poor endurance, GW, decreased functional mobility, and decreased static standing balance following episode of acute respiratory failure with hypoxia. O2 sats remained stable in upper 90s on 50% FiO2. Pt was cleared for EOB mobility by RN and MD. Pt tolerated sitting unsupported at EOB for approximately 5 minutes to wash her face and receive assist with washing backside of her UB. Pt completed x3 attempts to stand, then quickly fatigued and required Max Assist x2 to return supine d/t fatigue.  Pt will benefit from acute skilled acute OT to maximize her safety, endurance and independence with performing seated/standing aspects of her ADL routine at [facility]. Recommend SNF rehab, pending progress. Current Level of Function Impacting Discharge (ADLs/self-care): Mod Assist x2 taking a few steps to [facility]    Feeding: Total assistance(unable, vented)  Oral Facial Hygiene/Grooming: Minimum assistance(d/t multiple lines, yet otherwise Setup A)  Bathing: Maximum assistance  Upper Body Dressing: Moderate assistance(d/t multiple lines/leads; otherwise needs assist w/ backside)  Lower Body Dressing: Total assistance  Toileting: Total assistance    Functional Outcome Measure: The patient scored Total: 30/100 on the Barthel Index outcome measure which is indicative of 70% impaired ability to care for basic self needs/dependency on others; inferred 70% dependency on others for instrumental ADLs. Other factors to consider for discharge: Respiratory status     Patient will benefit from skilled therapy intervention to address the above noted impairments. PLAN :  Recommendations and Planned Interventions: self care training, functional mobility training, balance training, therapeutic activities, endurance activities, patient education and home safety training    Frequency/Duration: Patient will be followed by occupational therapy 4 times a week to address goals.     Recommendation for discharge: (in order for the patient to meet his/her long term goals)  Therapy up to 5 days/week in SNF setting    This discharge recommendation:  Has been made in collaboration with the attending provider and/or case management    IF patient discharges home will need the following DME: TBD based on progress       SUBJECTIVE:   Patient stated I'm alright\" (Pt mouthed)    OBJECTIVE DATA SUMMARY:   HISTORY:   Past Medical History:   Diagnosis Date    Diabetes ([facility] [address]     Hypertension     Ill-defined condition     high cholesterol    Ill-defined condition     tachycardia    Other and unspecified symptoms and signs involving general sensations and perceptions     ruptured disc   No past surgical history on file. Expanded or extensive additional review of patient history:     Home Situation  Home Environment: Private residence  # Steps to Enter: 4  Rails to Enter: Yes  Hand Rails : Bilateral  One/Two Story Residence: One story  Living Alone: No  Support Systems: Child(dejuan), Family member(s)  Patient Expects to be Discharged to[de-identified] Unknown  Current DME Used/Available at Home: Cane, straight, Walker, rolling  Tub or Shower Type: Tub/Shower combination(sponge bathes )    Hand dominance: Right    EXAMINATION OF PERFORMANCE DEFICITS:  Cognitive/Behavioral Status:                      Skin: Intact    Edema: Significant edema BLEs    Hearing: Auditory  Auditory Impairment: None    Vision/Perceptual:                           Acuity: Within Defined Limits         Range of Motion:  AROM: Generally decreased, functional  PROM: Within functional limits                      Strength:  Strength: Generally decreased, functional                Coordination:  Coordination: Within functional limits  Fine Motor Skills-Upper: Left Intact; Right Intact    Gross Motor Skills-Upper: Left Intact; Right Intact    Tone & Sensation:  Tone: Normal  Sensation: Intact                      Balance:  Sitting: Impaired; Without support  Sitting - Static: Good (unsupported)  Sitting - Dynamic: Fair (occasional)  Standing: Impaired; With support  Standing - Static: Constant support;Poor  Standing - Dynamic : Poor    Functional Mobility and Transfers for ADLs:  Bed Mobility:  Rolling: Contact guard assistance  Supine to Sit: Minimum assistance  Sit to Supine: Maximum assistance;Assist x2  Scooting: Maximum assistance;Assist x2    Transfers:  Sit to Stand: Moderate assistance;Assist x2  Stand to Sit: Moderate assistance;Assist x2  Bed to Chair: (unable, vented )    ADL Assessment:  Feeding:  Total assistance(unable, vented)    Oral Facial Hygiene/Grooming: Minimum assistance(d/t multiple lines, yet otherwise Setup A)    Bathing: Maximum assistance    Upper Body Dressing: Moderate assistance(d/t multiple lines/leads; otherwise needs assist w/ backside)    Lower Body Dressing: Total assistance    Toileting: Total assistance       ADL Intervention and task modifications:       Grooming  Washing Face: Set-up; Supervision(d/t multiple lines)    Lower Body Dressing Assistance  Socks: Total assistance (dependent)  Position Performed: Supine    Functional Measure:  Barthel Index:    Bathin  Bladder: 0(irwin)  Bowels: 10  Groomin  Dressin  Feedin  Mobility: 0  Stairs: 0  Toilet Use: 0  Transfer (Bed to Chair and Back): 5  Total: 30/100        The Barthel ADL Index: Guidelines  1. The index should be used as a record of what a patient does, not as a record of what a patient could do. 2. The main aim is to establish degree of independence from any help, physical or verbal, however minor and for whatever reason. 3. The need for supervision renders the patient not independent. 4. A patient's performance should be established using the best available evidence. Asking the patient, friends/relatives and nurses are the usual sources, but direct observation and common sense are also important. However direct testing is not needed. 5. Usually the patient's performance over the preceding 24-48 hours is important, but occasionally longer periods will be relevant. 6. Middle categories imply that the patient supplies over 50 per cent of the effort. 7. Use of aids to be independent is allowed. Yamila Perry, Barthel, D.W. (2730). Functional evaluation: the Barthel Index. [address] [facility] (14)2. MITCH Driscoll, rBisa Del Cid., Maxine Diez., Jeri, [address] [place] [address] (). Measuring the change indisability after inpatient rehabilitation; comparison of the responsiveness of the Barthel Index and Functional [place] Measure.  Journal of Neurology, Neurosurgery, and Psychiatry, 66(4), 238-883. ROMÁN Milton, JAME Figueroa, & Dianna Ruelas M.A. (2004.) Assessment of post-stroke quality of life in cost-effectiveness studies: The usefulness of the Barthel Index and the EuroQoL-5D. Quality of Life Research, 15, 485-18         Occupational Therapy Evaluation Charge Determination   History Examination Decision-Making   LOW Complexity : Brief history review  LOW Complexity : 1-3 performance deficits relating to physical, cognitive , or psychosocial skils that result in activity limitations and / or participation restrictions  LOW Complexity : No comorbidities that affect functional and no verbal or physical assistance needed to complete eval tasks       Based on the above components, the patient evaluation is determined to be of the following complexity level: LOW   Pain Rating:  None reported     Activity Tolerance:   Poor  Please refer to the flowsheet for vital signs taken during this treatment. After treatment patient left in no apparent distress:    Supine in bed, Heels elevated for pressure relief, Call bell within reach, Caregiver / family present and Side rails x 3    COMMUNICATION/EDUCATION:   The patients plan of care was discussed with: Physical therapist, Registered nurse and Patient. Home safety education was provided and the patient/caregiver indicated understanding., Patient/family have participated as able in goal setting and plan of care. and Patient/family agree to work toward stated goals and plan of care.     Thank you for this referral.  Prabha Solis OTR/L  Time Calculation: 34 mins

## 2020-07-27 NOTE — PROGRESS NOTES
Care Management:    RAMO: Rehab versus home with DTR and HH. Patient remains in ICU on vent support and not ready to liberate yet. PTA she lives with her DTR her takes care of her. She does not walk according to DTR and transfer from bed to wheelchair. She has a walker, BSC, Cane , wheelchair and walker     Chart reviewed and we will cont to follow . Once extubated PT and OT have been consulted to work with her.      Latisha Ken RN ACM [id number]

## 2020-07-27 NOTE — PROGRESS NOTES
Problem: Mobility Impaired (Adult and Pediatric)  Goal: *Acute Goals and Plan of Care (Insert Text)  Description: FUNCTIONAL STATUS PRIOR TO ADMISSION: Patient was modified independent using a single point cane for functional mobility. Patient reports she sponge bathes. Denies any falls. HOME SUPPORT PRIOR TO ADMISSION: The patient lived with her daughter and family but did not require assist.     Physical Therapy Goals  Initiated 7/27/2020  1. Patient will move from supine to sit and sit to supine , scoot up and down, and roll side to side in bed with modified independence within 7 day(s). 2.  Patient will transfer from bed to chair and chair to bed with minimal assistance/contact guard assist using the least restrictive device within 7 day(s). 3.  Patient will perform sit to stand with minimal assistance/contact guard assist within 7 day(s). 4.  Patient will ambulate with moderate assistance  for 10 feet with the least restrictive device within 7 day(s). Outcome: Not Met   PHYSICAL THERAPY EVALUATION  Patient: Yanna Lr (1 y.o. female)  Date: 7/27/2020  Primary Diagnosis: COVID-19 ruled out [Z03.818]        Precautions:        ASSESSMENT  Based on the objective data described below, the patient presents with decreased strength, decreased functional mobility, impaired balance, decreased endurance, and difficulty standing following admission for respiratory failure. Patient is functioning below her baseline. Patient is intubated on 50% FiO2 although cleared by nursing and intensivist for therapy. She was able to mouth around the ETT as well as answer yes/no questions appropriately. Patient required min A for bed mobility, max  A x 2 to return to supine. She tolerated sitting EOB x 10 minutes with supervision. She demonstrated difficulty standing from the bed, leaning posteriorly despite verbal and visual cues. Performed x 3 stands with mod A x 2 and HHA.  On third attempt, patient had increased difficulty, barely clearing bed surface. She was not safe to attempt getting to the chair this date due to weakness and vent. VSS on vent throughout session, O2 sats at 97%. Current Level of Function Impacting Discharge (mobility/balance): mod-max A x 2     Functional Outcome Measure: The patient scored 30/100 on the Barthel outcome measure which is indicative of high functional impairment. Other factors to consider for discharge: vent support, fall risk, decreased endurance     Patient will benefit from skilled therapy intervention to address the above noted impairments. PLAN :  Recommendations and Planned Interventions: bed mobility training, transfer training, therapeutic exercises, patient and family training/education, and therapeutic activities      Frequency/Duration: Patient will be followed by physical therapy:  4 times a week to address goals. Recommendation for discharge: (in order for the patient to meet his/her long term goals)  Therapy up to 5 days/week in SNF setting    This discharge recommendation:  Has not yet been discussed the attending provider and/or case management    IF patient discharges home will need the following DME: to be determined (TBD)         SUBJECTIVE:   Patient stated I'm okay.  (mouthing around the vent and giving thumbs up). OBJECTIVE DATA SUMMARY:   HISTORY:    Past Medical History:   Diagnosis Date    Diabetes ([facility] [address]     Hypertension     Ill-defined condition     high cholesterol    Ill-defined condition     tachycardia    Other and unspecified symptoms and signs involving general sensations and perceptions     ruptured disc   No past surgical history on file.     Personal factors and/or comorbidities impacting plan of care: vent support, fall risk, endurance    Home Situation  Home Environment: Private residence  # Steps to Enter: 4  Rails to Enter: Yes  Hand Rails : Bilateral  One/Two Story Residence: One story  Living Alone: No  Support Systems: Child(dejuan), Family member(s)  Patient Expects to be Discharged to[de-identified] Unknown  Current DME Used/Available at Home: Cane, straight, Walker, rolling  Tub or Shower Type: Tub/Shower combination(sponge bathes )    EXAMINATION/PRESENTATION/DECISION MAKING:   Critical Behavior:  Neurologic State: Eyes open to stimulus, Eyes open spontaneously, Eyes open to voice, Pharmacologically induced (comment)(Intubated and slightly sedated. )  Orientation Level: Unable to verbalize  Cognition: Follows commands, Decreased attention/concentration, Impaired decision making     Hearing: Auditory  Auditory Impairment: None  Skin:    Edema:   Range Of Motion:  AROM: Generally decreased, functional           PROM: Within functional limits           Strength:    Strength: Generally decreased, functional                    Tone & Sensation:   Tone: Normal              Sensation: Intact               Coordination:  Coordination: Within functional limits  Vision:      Functional Mobility:  Bed Mobility:  Rolling: Contact guard assistance  Supine to Sit: Minimum assistance  Sit to Supine: Maximum assistance;Assist x2  Scooting: Maximum assistance;Assist x2  Transfers:  Sit to Stand: Moderate assistance;Assist x2  Stand to Sit: Moderate assistance;Assist x2        Bed to Chair: (unable, vented )              Balance:   Sitting: Impaired; Without support  Sitting - Static: Good (unsupported)  Sitting - Dynamic: Fair (occasional)  Standing: Impaired; With support  Standing - Static: Constant support;Poor  Standing - Dynamic : Poor  Ambulation/Gait Training:                                          Functional Measure:  Barthel Index:    Bathin  Bladder: 0  Bowels: 10  Groomin  Dressin  Feedin  Mobility: 0  Stairs: 0  Toilet Use: 0  Transfer (Bed to Chair and Back): 5  Total: 30/100       The Barthel ADL Index: Guidelines  1.  The index should be used as a record of what a patient does, not as a record of what a patient could do.  2. The main aim is to establish degree of independence from any help, physical or verbal, however minor and for whatever reason. 3. The need for supervision renders the patient not independent. 4. A patient's performance should be established using the best available evidence. Asking the patient, friends/relatives and nurses are the usual sources, but direct observation and common sense are also important. However direct testing is not needed. 5. Usually the patient's performance over the preceding 24-48 hours is important, but occasionally longer periods will be relevant. 6. Middle categories imply that the patient supplies over 50 per cent of the effort. 7. Use of aids to be independent is allowed. Renford Brittle., Barthel, DEliazarW. (7002). Functional evaluation: the Barthel Index. [address] [facility] (14)2. Juan Ramon Walton maurice MITCH Pruett, Samia Bazan., Jacque Houser., [place], [address] (1999). Measuring the change indisability after inpatient rehabilitation; comparison of the responsiveness of the Barthel Index and Functional [place] Measure. Journal of Neurology, Neurosurgery, and Psychiatry, 66(4), 434-988. Svitlana Cruz, N.J.A, JAME Figueroa, & Jovanni Wills, MNATASHA. (2004.) Assessment of post-stroke quality of life in cost-effectiveness studies: The usefulness of the Barthel Index and the EuroQoL-5D.  Quality of Life Research, 15, 269-79            Physical Therapy Evaluation Charge Determination   History Examination Presentation Decision-Making   MEDIUM  Complexity : 1-2 comorbidities / personal factors will impact the outcome/ POC  MEDIUM Complexity : 3 Standardized tests and measures addressing body structure, function, activity limitation and / or participation in recreation  MEDIUM Complexity : Evolving with changing characteristics  Other outcome measures Barthel   MEDIUM      Based on the above components, the patient evaluation is determined to be of the following complexity level: MEDIUM      Activity Tolerance:   Fair  Please refer to the flowsheet for vital signs taken during this treatment. After treatment patient left in no apparent distress:   Supine in bed, Heels elevated for pressure relief, Call bell within reach, and Side rails x 3    COMMUNICATION/EDUCATION:   The patients plan of care was discussed with: Occupational therapist, Registered nurse, and Case management. Fall prevention education was provided and the patient/caregiver indicated understanding., Patient/family have participated as able in goal setting and plan of care. , and Patient/family agree to work toward stated goals and plan of care.     Thank you for this referral.  Warner James, PT, DPT   Time Calculation: 35 mins

## 2020-07-27 NOTE — PROGRESS NOTES
Pharmacy Automatic Renal Dosing Protocol - Antimicrobials    Indication for Antimicrobials: sepsis     Current Regimen of Each Antimicrobial:  Cefepime 2g IV q12h (Start Date ; Day # 1)    Previous Antimicrobial Therapy:    Significant Cultures:   COVID negative x 2   resp panel - negative (final)   resp panel (pending)   paired bcx (pending)    Radiology / Imaging results: (X-ray, CT scan or MRI):     Paralysis, amputations, malnutrition: none noted    Labs:  Recent Labs     20  0442 20  0534 20  0545   CREA 1.03* 1.10* 1.05*   BUN * * *   WBC 9.2 8.0 9.3     Temp (24hrs), Av.4 °F (37.4 °C), Min:99.1 °F (37.3 °C), Max:100 °F (37.8 °C)    Creatinine Clearance (mL/min) or Dialysis: 48    Impression/Plan:   Per protocol, will adjust cefepime 2g IV q8h to Cefepime 2g IV q12h due to CrCl  CMP ordered for AM  Antimicrobial stop date TBD     Pharmacy will follow daily and adjust medications as appropriate for renal function and/or serum levels. Thank you,  Christine Escobar, PHARMD    Recommended duration of therapy  [email or web address]/Pharmacy/Clinical%20Companion/[email or web address]. docx    Renal Dosing  [email or web address]/[place]/[facility]/Pharmacy/Clinical%20Companion/Renal%[id number]. pdf

## 2020-07-27 NOTE — PROGRESS NOTES
PULMONARY/Critical Care/SLEEP MEDICINE  [facility] [address] [facility]  Name: Deanna Suh   : 1960   MRN: [id number]   Date: 2020 9:33 AM       []      The patient is critically ill on      [x]      Mechanical ventilation []      Pressors   []      BiPAP []                   1. Acute respiratory failure  2. CHF ? Systolic/diastolic? 3. Hypotension ? Sedation related  4. Ventilator dependence  5. obesity  6. High suspicion JOSLYN  7. Hypercapnea  8. H/o tobacco   9. Asthma hx           Full ventilator support-not ready to extubate  Diuretics as tolerated  Daily SAt, SBt  brochial hygine  Culture  Sputum  Empiric abx  Echo-may need definity if poor acoustic window  Follow up CXR  Vasopressors to MAP ~60-65  DVT ppx  ICU protocols  I/O  Outpatient PFT  Outpatient sleep study        thick secretions. Alert on vent. Alkalotic. Failed SBT this weeekkend              Alert .  Rapid shallow breathing on PSV 8/PP6             Vital Signs:    Visit Vitals  /63   Pulse 65   Temp 99.2 °F (37.3 °C)   Resp 20   Ht 5' 3\" (1.6 m)   Wt 136.1 kg (300 lb 0.7 oz)   SpO2 95%   BMI 53.15 kg/m²       O2 Device: Ventilator   O2 Flow Rate (L/min): 15 l/min   Temp (24hrs), Av.2 °F (37.3 °C), Min:99.1 °F (37.3 °C), Max:99.2 °F (37.3 °C)       Intake/Output:   Last shift:       07 - 1900  In: 740.6 [I.V.:500.6]  Out: 580 [Urine:530]  Last 3 shifts:  190 -  0700  In: 3241.7 [I.V.:2691.7]  Out: 6492 [Urine:6225]    Intake/Output Summary (Last 24 hours) at 2020 1348  Last data filed at 2020 1300  Gross per 24 hour   Intake 2160.72 ml   Output 2035 ml   Net 125.72 ml     Hemodynamics:   PAP:     Wedge:     CVP:  CVP (mmHg): 6 mmHg (20 1300)   CO:     CI:     SVR:     PVR:        Ventilator Settings:  Ventilator Mode: Assist control, Volume control  Respiratory Rate  Resp Rate Observed: 35  Back-Up Rate: 12  Insp Flow (l/min): 60 l/min  I:E Ratio: 1:4.7  Ventilator Volumes  Vt Set (ml): 480 ml  Vt Exhaled (Machine Breath) (ml): 514 ml  Vt Spont (ml): 321 ml  Ve Observed (l/min): 10.3 l/min  Ventilator Pressures  PC Set: 480  Pressure Support (cm H2O): 8 cm H2O  PIP Observed (cm H2O): 44 cm H2O  Plateau Pressure (cm H2O): 29 cm H2O  MAP (cm H2O): 16  PEEP/VENT (cm H2O): 6 cm H20  Auto PEEP Observed (cm H2O): 0 cm H2O    Physical Exam:    General: [x]      Intubated/sedated []      No acute distress []          []      Ill appearing []       []            HEENT: [x]     ETT []      PERRL []     NGT     []      Anicteric Mucosa:[]      moist   []      dry []            Resp: [x]      Wheeze []      Clear to ascultation bilaterally []      Accessory muscle use    []      Rales []      Chest tube:  []      Air leak []          []      Ronchi []      Crepitus []         []      Coarse bilateral ventilator breath sounds      CV: [x]      Regular []      Tachycardia []          []      Irregular []      Bradycardic []          []      Murmur []      S1 []          []     RUB []    S2 []            GI: [x]      Soft  []      NG Tube Bowel sounds: []      present []      absent    []      Firm []      PEG []    Tender      []      Distended  []            : []      Petersen []      Hematuria []          []      Clear urine []       []            MSK:    []      SCDs []      Edema []   clubbing       []      No deformities []     Cyanosis  []            Skin: []      Warm [x]      Dry  []   Mottled       []      Cool []      Moist []          []     Lesions []      Diaphoretic []          []      Rash  []      Cyanosis []            N-psych: []      Sedated  []      Agitated []     Moves all extremities     [x]      Alert  Follows commands:   [x]      Yes  []      No []     oriented       Devices: []      PA Catheter []      Tracheostomy []          []      Central Line []        []        Chest tube:  Air leak? []        Y/[]        N    []          []      PICC []       [] DATA:   Current Facility-Administered Medications   Medication Dose Route Frequency    chlorhexidine (ORAL CARE KIT) 0.12 % mouthwash 15 mL  15 mL Oral Q12H    potassium bicarb-citric acid (EFFER-K) tablet 40 mEq  40 mEq Oral BID    famotidine (PF) (PEPCID) 20 mg in 0.9% sodium chloride 10 mL injection  20 mg IntraVENous Q12H    acetaZOLAMIDE (DIAMOX) 500 mg in sterile water (preservative free) 5 mL injection  500 mg IntraVENous ONCE    cefepime (MAXIPIME) 2 g in 0.9% sodium chloride (MBP/ADV) 100 mL  2 g IntraVENous Q12H    alcohol 62% (NOZIN) nasal  1 Ampule  1 Ampule Topical Q12H    fentaNYL (PF) 1,500 mcg/30 mL (50 mcg/mL) infusion  0-200 mcg/hr IntraVENous CONTINUOUS    insulin lispro (HUMALOG) injection   SubCUTAneous Q6H    glucose chewable tablet 16 g  4 Tab Oral PRN    dextrose (D50W) injection syrg 12.5-25 g  12.5-25 g IntraVENous PRN    glucagon (GLUCAGEN) injection 1 mg  1 mg IntraMUSCular PRN    dexmedeTOMidine (PRECEDEX) 400 mcg in 0.9% sodium chloride 100 mL infusion  0.2-1.4 mcg/kg/hr IntraVENous TITRATE    dextrose 5% infusion  50 mL/hr IntraVENous CONTINUOUS    sodium chloride (NS) flush 5-40 mL  5-40 mL IntraVENous Q8H    sodium chloride (NS) flush 5-40 mL  5-40 mL IntraVENous PRN    acetaminophen (TYLENOL) tablet 650 mg  650 mg Oral Q6H PRN    Or    acetaminophen (TYLENOL) suppository 650 mg  650 mg Rectal Q6H PRN    polyethylene glycol (MIRALAX) packet 17 g  17 g Oral DAILY PRN    [Held by provider] enoxaparin (LOVENOX) injection 40 mg  40 mg SubCUTAneous DAILY    ondansetron (ZOFRAN) injection 4 mg  4 mg IntraVENous Q6H PRN    aspirin chewable tablet 81 mg  81 mg Oral DAILY    [Held by provider] metoprolol tartrate (LOPRESSOR) tablet 25 mg  25 mg Oral BID    [Held by provider] atorvastatin (LIPITOR) tablet 10 mg  10 mg Oral DAILY    nystatin (MYCOSTATIN) 100,000 unit/gram powder   Topical BID    hydrALAZINE (APRESOLINE) 20 mg/mL injection 20 mg  20 mg IntraVENous Q6H PRN    NOREPINephrine (LEVOPHED) 8 mg in 5% dextrose 250mL (32 mcg/mL) infusion  0.5-16 mcg/min IntraVENous TITRATE    propofol (DIPRIVAN) 10 mg/mL infusion  0-50 mcg/kg/min IntraVENous TITRATE       Telemetry: []        Sinus []        A-flutter []        Paced    []        A-fib []        Multiple PVCs                  Labs:  Recent Labs     07/27/20 0442 07/26/20  0534 07/25/20  0545   WBC 9.2 8.0 9.3   HGB 13.6 14.1 13.5   HCT 44.2 46.4 45.2   * 120* 142*     Recent Labs     07/27/20 0442 07/26/20  0534 07/25/20  0545    141 143   K 3.2* 3.3* 3.9   CL 93* 96* 100   CO2 41* 42* 38*   GLU 93 133* 83   BUN 25* 27* 27*   CREA 1.03* 1.10* 1.05*   CA 8.5 9.1 9.1   ALB 2.3* 2.5*  --    TBILI 2.2* 1.8*  --    ALT 16 17  --    INR  --  1.1  --      No results for input(s): PH, PCO2, PO2, HCO3, FIO2 in the last 72 hours.     Imaging:  [x]      I have personally reviewed the patients radiographs     []      No change from prior, tubes and lines in adequate position  []      Improved   []      Worsening     IMPRESSION:     The patient is: []       acutely ill Risk of deterioration: []       moderate    []       critically ill  []       high     Active Problems:    COVID-19 ruled out (7/24/2020)          ·   []      Total critical care time exclusive of procedures   35    minutes  Skylar Shaw MD

## 2020-07-27 NOTE — PROGRESS NOTES
Interdisciplinary team rounds were held  7/27/2020 with the following team members:Care Management, Diabetes Treatment Specialist, Nursing, Nutrition, Pharmacy, Physical Therapy, Physician and Clinical Coordinator. Plan of care discussed. Goal: See MD orders and progress notes for further  interventions and desired outcomes.

## 2020-07-27 NOTE — PROGRESS NOTES
Pharmacy Medication Reconciliation     The patient was not interviewed about her medication history. The history was obtained from her daughter, Arthur. Allergy Update: Other plant, animal, environmental    Recommendations/Findings: The following amendments were made to the [facility] [address] [facility]:   1) Additions:     2) Deletions: aspirin    3) Changes:       Pertinent Findings: Patient has not been taking her Lasix for several months per daughter. Patient has difficulty getting up to use the restroom.    -Clarified PTA med list with Arthur, daughter. PTA medication list was corrected to the following:     Prior to Admission Medications   Prescriptions Last Dose Informant Taking?   atorvastatin (LIPITOR) 10 mg tablet  Child Yes   Sig: Take 10 mg by mouth daily. furosemide (LASIX) 20 mg tablet Not Taking at Unknown time Child No   Sig: Take 20 mg by mouth daily. metoprolol (LOPRESSOR) 25 mg tablet  Child Yes   Sig: Take 25 mg by mouth two (2) times a day.       Facility-Administered Medications: None          Thank you,  Forest Mack, PHARMD

## 2020-07-27 NOTE — PROGRESS NOTES
0745 - Bedside verbal report received from off going shift. 0800 - Assessment complete, see summary for details. Eyes open spontaneously, follows all commands. Nods head appropriately. Nods head yes to Verbal contract agreeing  to keep hands away from tubes/wires. Bilateral wrist restraints removed. Call bell within reach. Suctioned for large amount thick tan secretions. Alert and intermittently restless, increased Precedex gtt as needed to maintain RASS = 0. Dr. Isaac Oshea at bedside, updated on condition, orders noted. Re-positioned for comfort. Will continue to closely monitor. 4380 - Dr. Isaac Oshea aware of CVP = 6-9, verbal order noted to hold Lasix, further orders pending. 0411 - Dr. Marshall berumen at bedside for interdisciplinary rounds, updated on condition, orders noted. 1200 - Assessment complete, see summary for details. No changes noted from previous assessment. 1600 - Resting quietly,  Re-positioned for comfort with call bell within reach. Assessment complete, see summary for details, no changes noted. 1900 - Bedside verbal report given to oncoming shift.

## 2020-07-27 NOTE — PROGRESS NOTES
07/27/20 0551   ABCDEF Bundle   SBT Trial Reason for Failure RSBI>105; Low tidal volume; Oxygen saturation < 88%; Respiratory rate > 35

## 2020-07-28 LAB
ALBUMIN SERPL-MCNC: 2.1 G/DL (ref 3.5–5)
ALBUMIN/GLOB SERPL: 0.5 {RATIO} (ref 1.1–2.2)
ALP SERPL-CCNC: 73 U/L (ref 45–117)
ALT SERPL-CCNC: 17 U/L (ref 12–78)
ANION GAP SERPL CALC-SCNC: 6 MMOL/L (ref 5–15)
ARTERIAL PATENCY WRIST A: YES
ARTERIAL PATENCY WRIST A: YES
AST SERPL-CCNC: 25 U/L (ref 15–37)
BASE EXCESS BLD CALC-SCNC: 13 MMOL/L
BASE EXCESS BLD CALC-SCNC: 5 MMOL/L
BASOPHILS # BLD: 0 K/UL (ref 0–0.1)
BASOPHILS NFR BLD: 0 % (ref 0–1)
BDY SITE: ABNORMAL
BDY SITE: ABNORMAL
BILIRUB SERPL-MCNC: 2.4 MG/DL (ref 0.2–1)
BUN SERPL-MCNC: 22 MG/DL (ref 6–20)
BUN/CREAT SERPL: 24 (ref 12–20)
CA-I BLD-SCNC: 1.17 MMOL/L (ref 1.12–1.32)
CA-I BLD-SCNC: 1.23 MMOL/L (ref 1.12–1.32)
CALCIUM SERPL-MCNC: 8.7 MG/DL (ref 8.5–10.1)
CHLORIDE SERPL-SCNC: 95 MMOL/L (ref 97–108)
CO2 SERPL-SCNC: 33 MMOL/L (ref 21–32)
CREAT SERPL-MCNC: 0.93 MG/DL (ref 0.55–1.02)
D DIMER PPP FEU-MCNC: 1.1 MG/L FEU (ref 0–0.65)
DIFFERENTIAL METHOD BLD: ABNORMAL
EOSINOPHIL # BLD: 0.2 K/UL (ref 0–0.4)
EOSINOPHIL NFR BLD: 2 % (ref 0–7)
ERYTHROCYTE [DISTWIDTH] IN BLOOD BY AUTOMATED COUNT: 15.1 % (ref 11.5–14.5)
FIBRINOGEN PPP-MCNC: 659 MG/DL (ref 200–475)
GAS FLOW.O2 O2 DELIVERY SYS: ABNORMAL L/MIN
GAS FLOW.O2 O2 DELIVERY SYS: ABNORMAL L/MIN
GAS FLOW.O2 SETTING OXYMISER: 12 BPM
GLOBULIN SER CALC-MCNC: 4.5 G/DL (ref 2–4)
GLUCOSE BLD STRIP.AUTO-MCNC: 108 MG/DL (ref 65–100)
GLUCOSE BLD STRIP.AUTO-MCNC: 110 MG/DL (ref 65–100)
GLUCOSE BLD STRIP.AUTO-MCNC: 111 MG/DL (ref 65–100)
GLUCOSE BLD STRIP.AUTO-MCNC: 114 MG/DL (ref 65–100)
GLUCOSE SERPL-MCNC: 117 MG/DL (ref 65–100)
HCO3 BLD-SCNC: 31.3 MMOL/L (ref 22–26)
HCO3 BLD-SCNC: 37.1 MMOL/L (ref 22–26)
HCT VFR BLD AUTO: 43.1 % (ref 35–47)
HGB BLD-MCNC: 13.3 G/DL (ref 11.5–16)
IMM GRANULOCYTES # BLD AUTO: 0.1 K/UL (ref 0–0.04)
IMM GRANULOCYTES NFR BLD AUTO: 1 % (ref 0–0.5)
LYMPHOCYTES # BLD: 1.4 K/UL (ref 0.8–3.5)
LYMPHOCYTES NFR BLD: 15 % (ref 12–49)
MAGNESIUM SERPL-MCNC: 2.1 MG/DL (ref 1.6–2.4)
MCH RBC QN AUTO: 30.9 PG (ref 26–34)
MCHC RBC AUTO-ENTMCNC: 30.9 G/DL (ref 30–36.5)
MCV RBC AUTO: 100.2 FL (ref 80–99)
MONOCYTES # BLD: 0.6 K/UL (ref 0–1)
MONOCYTES NFR BLD: 6 % (ref 5–13)
NEUTS SEG # BLD: 7.2 K/UL (ref 1.8–8)
NEUTS SEG NFR BLD: 76 % (ref 32–75)
NRBC # BLD: 0 K/UL (ref 0–0.01)
NRBC BLD-RTO: 0 PER 100 WBC
O2/TOTAL GAS SETTING VFR VENT: 40 %
O2/TOTAL GAS SETTING VFR VENT: 50 %
PCO2 BLD: 57.2 MMHG (ref 35–45)
PCO2 BLD: 61.6 MMHG (ref 35–45)
PEEP RESPIRATORY: 6 CMH2O
PEEP RESPIRATORY: 6 CMH2O
PH BLD: 7.31 [PH] (ref 7.35–7.45)
PH BLD: 7.42 [PH] (ref 7.35–7.45)
PHOSPHATE SERPL-MCNC: 3.2 MG/DL (ref 2.6–4.7)
PLATELET # BLD AUTO: 130 K/UL (ref 150–400)
PMV BLD AUTO: 12.7 FL (ref 8.9–12.9)
PO2 BLD: 64 MMHG (ref 80–100)
PO2 BLD: 95 MMHG (ref 80–100)
POTASSIUM SERPL-SCNC: 3.3 MMOL/L (ref 3.5–5.1)
PRESSURE SUPPORT SETTING VENT: 6 CMH2O
PROT SERPL-MCNC: 6.6 G/DL (ref 6.4–8.2)
RBC # BLD AUTO: 4.3 M/UL (ref 3.8–5.2)
SAO2 % BLD: 89 % (ref 92–97)
SAO2 % BLD: 97 % (ref 92–97)
SERVICE CMNT-IMP: ABNORMAL
SODIUM SERPL-SCNC: 134 MMOL/L (ref 136–145)
SPECIMEN TYPE: ABNORMAL
SPECIMEN TYPE: ABNORMAL
TOTAL RESP. RATE, ITRR: 12
TOTAL RESP. RATE, ITRR: 32
VENTILATION MODE VENT: ABNORMAL
VENTILATION MODE VENT: ABNORMAL
VT SETTING VENT: 480 ML
WBC # BLD AUTO: 9.5 K/UL (ref 3.6–11)

## 2020-07-28 PROCEDURE — 74011000258 HC RX REV CODE- 258: Performed by: INTERNAL MEDICINE

## 2020-07-28 PROCEDURE — 74011250637 HC RX REV CODE- 250/637: Performed by: INTERNAL MEDICINE

## 2020-07-28 PROCEDURE — 74011250636 HC RX REV CODE- 250/636: Performed by: INTERNAL MEDICINE

## 2020-07-28 PROCEDURE — 85025 COMPLETE CBC W/AUTO DIFF WBC: CPT

## 2020-07-28 PROCEDURE — 80053 COMPREHEN METABOLIC PANEL: CPT

## 2020-07-28 PROCEDURE — 83735 ASSAY OF MAGNESIUM: CPT

## 2020-07-28 PROCEDURE — 94003 VENT MGMT INPAT SUBQ DAY: CPT

## 2020-07-28 PROCEDURE — 82803 BLOOD GASES ANY COMBINATION: CPT

## 2020-07-28 PROCEDURE — 74011000250 HC RX REV CODE- 250: Performed by: INTERNAL MEDICINE

## 2020-07-28 PROCEDURE — 85384 FIBRINOGEN ACTIVITY: CPT

## 2020-07-28 PROCEDURE — 85379 FIBRIN DEGRADATION QUANT: CPT

## 2020-07-28 PROCEDURE — 84100 ASSAY OF PHOSPHORUS: CPT

## 2020-07-28 PROCEDURE — 36415 COLL VENOUS BLD VENIPUNCTURE: CPT

## 2020-07-28 PROCEDURE — 65610000006 HC RM INTENSIVE CARE

## 2020-07-28 PROCEDURE — 82962 GLUCOSE BLOOD TEST: CPT

## 2020-07-28 PROCEDURE — 36600 WITHDRAWAL OF ARTERIAL BLOOD: CPT

## 2020-07-28 RX ORDER — MICONAZOLE NITRATE 20 MG/G
CREAM TOPICAL 2 TIMES DAILY
Status: DISCONTINUED | OUTPATIENT
Start: 2020-07-28 | End: 2020-08-05

## 2020-07-28 RX ORDER — CHLORPHENIRAMINE MALEATE 4 MG
TABLET ORAL 2 TIMES DAILY
Status: DISCONTINUED | OUTPATIENT
Start: 2020-07-28 | End: 2020-08-05

## 2020-07-28 RX ORDER — VANCOMYCIN 2 GRAM/500 ML IN 0.9 % SODIUM CHLORIDE INTRAVENOUS
2000 ONCE
Status: COMPLETED | OUTPATIENT
Start: 2020-07-28 | End: 2020-07-28

## 2020-07-28 RX ORDER — DEXMEDETOMIDINE HYDROCHLORIDE 4 UG/ML
.2-1.4 INJECTION, SOLUTION INTRAVENOUS
Status: DISCONTINUED | OUTPATIENT
Start: 2020-07-28 | End: 2020-07-28

## 2020-07-28 RX ORDER — VANCOMYCIN 1.75 GRAM/500 ML IN 0.9 % SODIUM CHLORIDE INTRAVENOUS
1750
Status: DISCONTINUED | OUTPATIENT
Start: 2020-07-29 | End: 2020-07-29

## 2020-07-28 RX ORDER — POTASSIUM CHLORIDE 29.8 MG/ML
20 INJECTION INTRAVENOUS
Status: COMPLETED | OUTPATIENT
Start: 2020-07-28 | End: 2020-07-28

## 2020-07-28 RX ORDER — DEXMEDETOMIDINE HYDROCHLORIDE 4 UG/ML
.2-1.4 INJECTION, SOLUTION INTRAVENOUS
Status: DISCONTINUED | OUTPATIENT
Start: 2020-07-28 | End: 2020-07-29

## 2020-07-28 RX ADMIN — CASTOR OIL AND BALSAM, PERU: 788; 87 OINTMENT TOPICAL at 06:28

## 2020-07-28 RX ADMIN — FAMOTIDINE 20 MG: 10 INJECTION, SOLUTION INTRAVENOUS at 09:20

## 2020-07-28 RX ADMIN — CEFEPIME HYDROCHLORIDE 2 G: 2 INJECTION, POWDER, FOR SOLUTION INTRAVENOUS at 23:30

## 2020-07-28 RX ADMIN — Medication 1 AMPULE: at 21:18

## 2020-07-28 RX ADMIN — MICONAZOLE NITRATE: 20 CREAM TOPICAL at 18:20

## 2020-07-28 RX ADMIN — DEXTROSE MONOHYDRATE 50 ML/HR: 5 INJECTION, SOLUTION INTRAVENOUS at 03:14

## 2020-07-28 RX ADMIN — POTASSIUM CHLORIDE 20 MEQ: 29.8 INJECTION, SOLUTION INTRAVENOUS at 11:55

## 2020-07-28 RX ADMIN — POTASSIUM CHLORIDE 20 MEQ: 29.8 INJECTION, SOLUTION INTRAVENOUS at 10:51

## 2020-07-28 RX ADMIN — Medication 10 ML: at 06:29

## 2020-07-28 RX ADMIN — Medication 40 ML: at 21:17

## 2020-07-28 RX ADMIN — Medication 40 ML: at 14:06

## 2020-07-28 RX ADMIN — VANCOMYCIN HYDROCHLORIDE 2000 MG: 10 INJECTION, POWDER, LYOPHILIZED, FOR SOLUTION INTRAVENOUS at 14:13

## 2020-07-28 RX ADMIN — CLOTRIMAZOLE: 10 CREAM TOPICAL at 18:20

## 2020-07-28 RX ADMIN — CHLORHEXIDINE GLUCONATE 15 ML: 0.12 RINSE ORAL at 21:18

## 2020-07-28 RX ADMIN — Medication 100 MCG/HR: at 04:00

## 2020-07-28 RX ADMIN — CHLORHEXIDINE GLUCONATE 15 ML: 0.12 RINSE ORAL at 09:20

## 2020-07-28 RX ADMIN — Medication 1 AMPULE: at 09:20

## 2020-07-28 RX ADMIN — FAMOTIDINE 20 MG: 10 INJECTION, SOLUTION INTRAVENOUS at 21:17

## 2020-07-28 RX ADMIN — PHENOL 1 SPRAY: 1.5 LIQUID ORAL at 19:35

## 2020-07-28 RX ADMIN — POTASSIUM CHLORIDE 20 MEQ: 29.8 INJECTION, SOLUTION INTRAVENOUS at 14:06

## 2020-07-28 RX ADMIN — DEXMEDETOMIDINE HYDROCHLORIDE 0.4 MCG/KG/HR: 100 INJECTION, SOLUTION, CONCENTRATE INTRAVENOUS at 19:05

## 2020-07-28 RX ADMIN — POTASSIUM CHLORIDE 20 MEQ: 29.8 INJECTION, SOLUTION INTRAVENOUS at 12:59

## 2020-07-28 RX ADMIN — ACETAMINOPHEN 650 MG: 325 TABLET ORAL at 03:12

## 2020-07-28 RX ADMIN — ASPIRIN 81 MG CHEWABLE TABLET 81 MG: 81 TABLET CHEWABLE at 09:21

## 2020-07-28 RX ADMIN — SODIUM CHLORIDE 0.4 MCG/KG/HR: 9 INJECTION, SOLUTION INTRAVENOUS at 12:36

## 2020-07-28 RX ADMIN — SODIUM CHLORIDE 0.4 MCG/KG/HR: 900 INJECTION, SOLUTION INTRAVENOUS at 14:05

## 2020-07-28 RX ADMIN — CASTOR OIL AND BALSAM, PERU: 788; 87 OINTMENT TOPICAL at 17:51

## 2020-07-28 RX ADMIN — ACETAZOLAMIDE SODIUM 500 MG: 500 INJECTION, POWDER, LYOPHILIZED, FOR SOLUTION INTRAVENOUS at 14:10

## 2020-07-28 RX ADMIN — CEFEPIME HYDROCHLORIDE 2 G: 2 INJECTION, POWDER, FOR SOLUTION INTRAVENOUS at 11:54

## 2020-07-28 RX ADMIN — SODIUM CHLORIDE 0.4 MCG/KG/HR: 900 INJECTION, SOLUTION INTRAVENOUS at 18:42

## 2020-07-28 RX ADMIN — SODIUM CHLORIDE 0.3 MCG/KG/HR: 9 INJECTION, SOLUTION INTRAVENOUS at 03:23

## 2020-07-28 RX ADMIN — NYSTATIN: 100000 POWDER TOPICAL at 09:21

## 2020-07-28 RX ADMIN — CLOTRIMAZOLE: 10 CREAM TOPICAL at 14:12

## 2020-07-28 NOTE — PROGRESS NOTES
0730 - Bedside verbal report received from off going shift. 0800 - Assessment complete, see summary for details. Eyes open spontaneously, watching TV, follows all commands. Denies discomfort/SOB/dizziness. Re-positioned for comfort, call bell within reach. Mouths words and nods head appropriately. Suctioned for small/moderate amount thick tan/cream color secretions. Sao2 94-98%, vent settings verified. No acute distress noted. Will continue to closely monitor. 8482 - Dr. Sadiq Judge at bedside, updated on condition, orders pending. 4568 - Dr. Radha berumen at bedside for interdisciplinary rounds, updated on condition, orders noted. 1200 - Assessment complete, no changes noted from previous assessment. Re-positioned for comfort with call bell within reach. Will continue to closely monitor. 1550 - RT at bedside, placed on Spontaneous trial per Dr. Sadiq Judge.    36 - Dr. Sadiq Judge at bedside, updated on condition, aware of condition and trial failure per RT. Will continue Spontaneous trial for now, obtain ABG at 2000 and rest on IMV setting with 10 PS overnight. RT aware of verbal orders. Will continue to closely monitor. 1805 - Sa02 sustained <90 with increasing lethargy noted, RT at bedside, updated on condition, will obtain ABG per Dr. Saucedo Nurse - Dr. Sadiq Judge at bedside, updated on condition, aware of ABG results, no new orders noted. Will place on IMV setting overnight and repeat SBT in AM..      1900 - Bedside verbal report given to oncoming shift.

## 2020-07-28 NOTE — CARDIO/PULMONARY
Cardiac Rehab Note: chart review Pt is a 59 yo admitted with acute respiratory failure, probable acute on chronic diastolic CHF. CHF BUNDLE   
 
EF 55%  on 7/27/20 per echo Smoking history assessed. Patient is a former smoker. Smoking Cessation Program link has not been added to the AVS. Current inpatient diet: DIET NPO \"Living with Heart Failure\" book with daily weight calendar and s/s of heart failure magnet will be provided to Yana Pugh when pt is awake, alert and medically stable. Patient was not available/able to meet at this time. Pt currently intubated. CP Rehab will  follow up during this admission when appropriate.

## 2020-07-28 NOTE — PROGRESS NOTES
Hospitalist Progress Note    NAME: Konstantin Reese   :  1960   MRN:  [id number]     Admit date: 2020    Today's date: 20    PCP: MOISE Gu M.D. Cell [phone]    Assessment / Plan:  Acute respiratory failure with hypoxia and hypercarbia POA  Probable acute on chronic diastolic CHF POA LVEF 44-93%  ? Acute bronchitis POA   Underlying JOSLYN or OHS POA with Body mass index is 58.58 kg/m².   Metabolic encephalopathy POA due to hypercarbia   COVID-19 rule out POA testing negative x 2  Baseline does not wear home o2, admits to chronic CHOPRA  Past few days worsening CHOPRA and SOB at rest  Positive LE edema and orthopnea  No fevers, positive dry cough  HCO3 elevated at admit 36, suspect some chronic CO2 retention at baseline  CXR with bilateral interstitial edema   pBNP 2,987  procalcitonin < 0.05  Differential; Acute left CHF, atypical pneumonia    Pulm consult, failed SBT trial today, awake and interactive, following commands on vent  No clear pneumonia, pulm added antibiotics, sputum culture showed heavy staph     Thrombocytopenia  PLTs dropped from 210,000 to 120,000, improved at [id number]   unlikely POP  Recheck in AM  Normal coags, fibrinogen     Essential HTN POA  Hyperlipidemia POA  Holding metoprolol and lipitor with intubation, borderline low BP  PRN labetalol  LDL 55,      DM type 2 POA  Not currently on treatment  POC, SSI  HgBa1c 6.0%     Remote tobacco use quit 40 years ago     Impaired mobility POA due to DJD knees  Not walking much at home, limited by knee pain     Fungal rash in trunk skin folds POA  Topical nystatin    Morbid obesity POA Body mass index is 53.15 kg/m².      DVT prophylaxis with lovenox     Code status: full code  NOK: daughter     Subjective:     Chief Complaint / Reason for Physician Visit f/u acute respiratory failure  Awake , still intubated   Review of Systems:  Symptom Y/N Comments  Symptom Y/N Comments Fever/Chills    Chest Pain     Poor Appetite    Edema     Cough    Abdominal Pain     Sputum    Joint Pain     SOB/CHOPRA    Headache     Nausea/vomit    Tolerating PT/OT     Diarrhea    Tolerating Diet     Constipation    Other       Could NOT obtain due to: Intubated     Objective:     VITALS:   Last 24hrs VS reviewed since prior progress note. Most recent are:  Patient Vitals for the past 24 hrs:   Temp Pulse Resp BP SpO2   07/28/20 1300  62 15 99/53 95 %   07/28/20 1200 98.4 °F (36.9 °C) 62 12 118/61 98 %   07/28/20 1139  61 12  98 %   07/28/20 1100  60 13 91/52 97 %   07/28/20 1000  60 12 94/46 98 %   07/28/20 0900  64 12 90/47 94 %   07/28/20 0800 98.9 °F (37.2 °C) 63 15 92/63 96 %   07/28/20 0755  62 12  97 %   07/28/20 0700  64 13 94/48 98 %   07/28/20 0600  63 13 96/48 96 %   07/28/20 0500  63 12 95/45 96 %   07/28/20 0400  64 17 91/44 95 %   07/28/20 0331  61 12  96 %   07/28/20 0300 100 °F (37.8 °C) 63 12 98/51 96 %   07/28/20 0200  63 10 99/48 95 %   07/28/20 0100  63 12 104/50 95 %   07/28/20 0000 (!) 100.5 °F (38.1 °C) 62 15 104/48 95 %   07/27/20 2330  62 16 99/46 95 %   07/27/20 2300  61 14 92/46 97 %   07/27/20 2200  63 12 90/44 96 %   07/27/20 2100  65 13 90/45 95 %   07/27/20 2000 100.2 °F (37.9 °C) 63 14 99/54 98 %   07/27/20 1923  64 12  98 %   07/27/20 1900  64 20 96/59 98 %   07/27/20 1800  63 14 95/53 98 %   07/27/20 1700  63 12 93/51 98 %   07/27/20 1600 99.1 °F (37.3 °C) 64 12 90/48 98 %   07/27/20 1532  64 12  98 %   07/27/20 1500  65 12 98/53 98 %       Intake/Output Summary (Last 24 hours) at 7/28/2020 1410  Last data filed at 7/28/2020 1300  Gross per 24 hour   Intake 1900.79 ml   Output 2350 ml   Net -449.21 ml        Wt Readings from Last 12 Encounters:   07/27/20 136.1 kg (300 lb 0.7 oz)   12/27/19 136.1 kg (300 lb)   01/30/19 136.1 kg (300 lb)   11/13/15 134.7 kg (297 lb)   03/17/15 140.1 kg (308 lb 13.8 oz)       PHYSICAL EXAM:  General: WD, WN. Intubated, alert  EENT:  PERRL. Anicteric sclerae. ETT in place  Resp:  Crackles at bases bilaterally, no wheezing. No accessory muscle use  CV:  Regular  rhythm,  1+ edema  GI:  Soft, Non distended, Non tender. +Bowel sounds, no rebound  LN:  No cervical or inguinal MONICA  Neurologic:  Intubated, alert and following commands  Psych:    Not anxious nor agitated  Skin:  No rashes. No jaundice    Reviewed most current lab test results and cultures  YES  Reviewed most current radiology test results   YES  Review and summation of old records today    NO  Reviewed patient's current orders and MAR    YES  PMH/SH reviewed - no change compared to H&P  ________________________________________________________________________  Care Plan discussed with:    Comments   Patient x    Family      RN x    Care Manager     Consultant                        Multidiciplinary team rounds were held today with , nursing, pharmacist and clinical coordinator. Patient's plan of care was discussed; medications were reviewed and discharge planning was addressed. ________________________________________________________________________      Comments   >50% of visit spent in counseling and coordination of care x    ________________________________________________________________________  Jori Roy MD     Procedures: see electronic medical records for all procedures/Xrays and details which were not copied into this note but were reviewed prior to creation of Plan. LABS:  I reviewed today's most current labs and imaging studies.   Pertinent labs include:  Recent Labs     07/28/20  0317 07/27/20  0442 07/26/20  0534   WBC 9.5 9.2 8.0   HGB 13.3 13.6 14.1   HCT 43.1 44.2 46.4   * 128* 120*     Recent Labs     07/28/20  0317 07/27/20  0442 07/26/20  0534   * 138 141   K 3.3* 3.2* 3.3*   CL 95* 93* 96*   CO2 33* 41* 42*   * 93 133*   BUN 22* 25* 27*   CREA 0.93 1.03* 1.10*   CA 8.7 8.5 9.1   MG 2.1  -- --    PHOS 3.2  --   --    ALB 2.1* 2.3* 2.5*   TBILI 2.4* 2.2* 1.8*   ALT 17 16 17   INR  --   --  1.1

## 2020-07-28 NOTE — PROGRESS NOTES
Comprehensive Nutrition Assessment    Type and Reason for Visit: Initial, NPO/clear liquid    Nutrition Recommendations/Plan:   If unable to extubate, recommend initiation of TF via OGT:    Would start TwoCal HN @ 10mL/h, advance rate 10mL q 8h as tolerated to Goal Rate of 30mL/h + Prosource TID + 50mL H2O flush q 4h (provides 1620kcals/105gPro/157gCHO/804mL)     Nutrition Assessment:   Pt admitted as COVID R/O.  PMH: HTN, DM. Chart reviewed, case discussed during CCU rounds. Pt is intubated and sedated with precedex. MST not filled out. Pt sleeping at time of attempted visit. No family in the room to speak with. NPO day 5. Discussed possibly starting TF today, Dr. Pari Juares would like to hold off as she is close to extubation. Provided TF recommendations above in case she remains intubated. She is being diuresed. TF at goal rate would meet 75% kcal and 100% protein needs, appropriate given BMI >50. Malnutrition Assessment:  Malnutrition Status:    UTD 2' lack of interview, but unlikely. Estimated Daily Nutrient Needs:  Energy (kcal):  2163 (MSJ 1900)  Protein (g):   (1.5-2gPro/kg IBW)       Fluid (ml/day):  1500    Nutrition Related Findings:  diamox, cefepime, pepcid, humalog, KCl; Drips: precedex. Edema: +2-generalized.   No BM yet      Wounds:    None       Current Nutrition Therapies:   DIET NPO Except Meds    Anthropometric Measures:  · Height:  5' 3\" (160 cm)  · Current Body Wt:  136.1 kg (300 lb 0.7 oz)   · Ideal Body Wt:  115:  260.9 %   · BMI Categories:  Obese class 3 (BMI 40.0 or greater)       Nutrition Diagnosis:   · Inadequate protein-energy intake related to other (specify)(intubation) as evidenced by NPO or clear liquid status due to medical condition      Nutrition Interventions:   Food and/or Nutrient Delivery: Start tube feeding  Nutrition Education and Counseling: No recommendations at this time  Coordination of Nutrition Care: No recommendation at this time    Goals:  Pt will advance to PO diet vs start on TF in 2-3 days. Nutrition Monitoring and Evaluation:   Physical Signs/Symptoms Outcomes: Biochemical data, Fluid status or edema, Weight, Hemodynamic status, GI status    Discharge Planning:     Too soon to determine     Electronically signed by Umu Haley RD, [address] on 7/28/2020 at 1:36 PM    Contact: [phone]

## 2020-07-28 NOTE — WOUND CARE
Wound Care Consult for this patient's skin folds and her feet / ankles where there is extensive fungal skin growth. Pt. Is quite unkept and lacks hygiene on her body and lower legs. Most likely she cannot reach those areas to bath and will need assistance with devices (OT). The folds in the pannus and under the breasts is red and with mild yeast dermatitis. Staff are currently using the Nystatin powder but the Secura Antifungal Cream would have better coverage on the skin. The feet need an antifungal cream like Lotrimin (Clotrimizole). Both of these were ordered today. The feet will have to be cleansed well prior to the application of the antifungal cream.  
The buttocks and gluteal cleft area have several issues with purple discoloration on the skin with excoriated areas on the buttocks. Right now the skin is blanchable and staff are turning her off of the back and only from right to left. Recommended orders written for care.   
Cammie Taylor RN, BSN, [employer]

## 2020-07-28 NOTE — PROGRESS NOTES
Spiritual Care Assessment/Progress Note  [facility]      NAME: Deanna Suh      MRN: [id number]  AGE: 60 y.o. SEX: female  [place] Affiliation: [place]   Language: English     7/28/2020     Total Time (in minutes): 11     Spiritual Assessment begun in [facility] CRITICAL CARE 2 through conversation with:         []Patient        [] Family    [] Friend(s)        Reason for Consult: Initial/Spiritual assessment, critical care     Spiritual beliefs: (Please include comment if needed)     [] Identifies with a rosalina tradition:         [] Supported by a rosalina community:            [] Claims no spiritual orientation:           [] Seeking spiritual identity:                [] Adheres to an individual form of spirituality:           [x] Not able to assess:                           Identified resources for coping:      [] Prayer                               [] Music                  [] Guided Imagery     [] Family/friends                 [] Pet visits     [] Devotional reading                         [x] Unknown     [] Other:                                                Interventions offered during this visit: (See comments for more details)    Patient Interventions: Initial visit           Plan of Care:     [x] Support spiritual and/or cultural needs    [] Support AMD and/or advance care planning process      [] Support grieving process   [] Coordinate Rites and/or Rituals    [] Coordination with community clergy   [] No spiritual needs identified at this time   [] Detailed Plan of Care below (See Comments)  [] Make referral to Music Therapy  [] Make referral to Pet Therapy     [] Make referral to Addiction services  [] Make referral to [facility]  [] Make referral to Spiritual Care Partner  [] No future visits requested        [x] Follow up visits as needed     Comments:   Attempted Initial Spiritual Assessment in CCU. Unable to assess patients needs.  Patient is currently intubated; appeared to be sleeping. No family present at this time. Chaplains will follow as able and/or as needed.          Corinne Parody, MPS, [facility], Staff [address] [place] [address] Paging Service  [phone]

## 2020-07-28 NOTE — INTERDISCIPLINARY ROUNDS
Interdisciplinary team rounds were held 7/28/2020 with the following team members:Care Management, Diabetes Treatment Specialist, Nursing, Nutrition, Pharmacy, Physical Therapy, Physician, Respiratory Therapy and Clinical Coordinator. Plan of care discussed. See clinical pathway and/or care plan for interventions and desired outcomes.

## 2020-07-28 NOTE — PROGRESS NOTES
PULMONARY/Critical Care/SLEEP MEDICINE  [facility]  Name: Popeye Rea   : 1960   MRN: [id number]   Date: 2020 9:33 AM       []      The patient is critically ill on      [x]      Mechanical ventilation []      Pressors   []      BiPAP []         Problems    1. Acute respiratory failure with hypercarbia, hypoxia  2. Alkalosis from diuretics  3. Staph bronchitis vs early bronchopneumonia  4. CHF ? Systolic/diastolic? 5. Hypotension ? Sedation related  6. Ventilator dependence  7. obesity  8. High suspicion JOSLYN  9. Hypercapnea  10. H/o tobacco   11. Asthma hx         Plan:    Correct alkalosis  Full ventilator support-close to extubation  Diuretics as tolerated  Daily SAt, SBt  brochial hygine  Culture  Sputum  Empiric abx  DVT ppx  ICU protocols  I/O  Outpatient PFT  Outpatient sleep study      failed SBT due to apnea. culture probable staph       thick secretions. Alert on vent. Alkalotic. Failed SBT this weeekkend           Alert .  Rapid shallow breathing on PSV PP6             Vital Signs:    Visit Vitals  BP 99/53   Pulse 62   Temp 98.4 °F (36.9 °C)   Resp 15   Ht 5' 3\" (1.6 m)   Wt 136.1 kg (300 lb 0.7 oz)   SpO2 95%   BMI 53.15 kg/m²       O2 Device: Endotracheal tube, Ventilator   O2 Flow Rate (L/min): 15 l/min   Temp (24hrs), Av.5 °F (37.5 °C), Min:98.4 °F (36.9 °C), Max:100.5 °F (38.1 °C)       Intake/Output:   Last shift:      701 - 1900  In: 490.9 [I.V.:460.9]  Out: 345 [Urine:345]  Last 3 shifts: 1901 -  07  In: 3127.1 [I.V.:2667.1]  Out: 1480 [Urine:3165]    Intake/Output Summary (Last 24 hours) at 2020 1306  Last data filed at 2020 1300  Gross per 24 hour   Intake 1967.79 ml   Output 2475 ml   Net -507.21 ml     Hemodynamics:   PAP:     Wedge:     CVP:  CVP (mmHg): 7 mmHg (20 1300)   CO:     CI:     SVR:     PVR:        Ventilator Settings:  Ventilator Mode: Assist control, Volume control  Respiratory Rate  Resp Rate Observed: 35  Back-Up Rate: 12  Insp Flow (l/min): 60 l/min  I:E Ratio: 1:4.68  Ventilator Volumes  Vt Set (ml): 480 ml  Vt Exhaled (Machine Breath) (ml): 507 ml  Vt Spont (ml): 321 ml  Ve Observed (l/min): 6.07 l/min  Ventilator Pressures  PC Set: 480  Pressure Support (cm H2O): 6 cm H2O  PIP Observed (cm H2O): 30 cm H2O  Plateau Pressure (cm H2O): 26 cm H2O  MAP (cm H2O): 11  PEEP/VENT (cm H2O): 6 cm H20  Auto PEEP Observed (cm H2O): 0 cm H2O    Physical Exam:    General: [x]      Intubated/sedated []      No acute distress []          []      Ill appearing []       []            HEENT: [x]     ETT []      PERRL []     NGT     []      Anicteric Mucosa:[]      moist   []      dry []            Resp: [x]      Wheeze []      Clear to ascultation bilaterally []      Accessory muscle use    []      Rales []      Chest tube:  []      Air leak []          []      Ronchi []      Crepitus []         []      Coarse bilateral ventilator breath sounds      CV: [x]      Regular []      Tachycardia []          []      Irregular []      Bradycardic []          []      Murmur []      S1 []          []     RUB []    S2 []            GI: [x]      Soft  []      NG Tube Bowel sounds: []      present []      absent    []      Firm []      PEG []    Tender      []      Distended  []            : []      Petersen []      Hematuria []          []      Clear urine []       []            MSK:    []      SCDs []      Edema []   clubbing       []      No deformities []     Cyanosis  []            Skin: []      Warm [x]      Dry  []   Mottled       []      Cool []      Moist []          []     Lesions []      Diaphoretic []          []      Rash  []      Cyanosis []            N-psych: []      Sedated  []      Agitated []     Moves all extremities     [x]      Alert  Follows commands:   [x]      Yes  []      No []     oriented       Devices: []      PA Catheter []      Tracheostomy []          []      Central Line []        [] Chest tube:  Air leak? []        Y/[]        N    []          []      PICC []       []            DATA:   Current Facility-Administered Medications   Medication Dose Route Frequency    potassium chloride 20 mEq in 50 ml IVPB  20 mEq IntraVENous Q1H    acetaZOLAMIDE (DIAMOX) 500 mg in sterile water (preservative free) 5 mL injection  500 mg IntraVENous ONCE    clotrimazole (LOTRIMIN) 1 % cream   Topical BID    miconazole (SECURA) 2 % extra thick cream   Topical BID    chlorhexidine (ORAL CARE KIT) 0.12 % mouthwash 15 mL  15 mL Oral Q12H    famotidine (PF) (PEPCID) 20 mg in 0.9% sodium chloride 10 mL injection  20 mg IntraVENous Q12H    cefepime (MAXIPIME) 2 g in 0.9% sodium chloride (MBP/ADV) 100 mL  2 g IntraVENous Q12H    balsam peru-castor oiL (VENELEX) ointment   Topical Q12H    alcohol 62% (NOZIN) nasal  1 Ampule  1 Ampule Topical Q12H    fentaNYL (PF) 1,500 mcg/30 mL (50 mcg/mL) infusion  0-200 mcg/hr IntraVENous CONTINUOUS    insulin lispro (HUMALOG) injection   SubCUTAneous Q6H    glucose chewable tablet 16 g  4 Tab Oral PRN    dextrose (D50W) injection syrg 12.5-25 g  12.5-25 g IntraVENous PRN    glucagon (GLUCAGEN) injection 1 mg  1 mg IntraMUSCular PRN    dexmedeTOMidine (PRECEDEX) 400 mcg in 0.9% sodium chloride 100 mL infusion  0.2-1.4 mcg/kg/hr IntraVENous TITRATE    dextrose 5% infusion  50 mL/hr IntraVENous CONTINUOUS    sodium chloride (NS) flush 5-40 mL  5-40 mL IntraVENous Q8H    sodium chloride (NS) flush 5-40 mL  5-40 mL IntraVENous PRN    acetaminophen (TYLENOL) tablet 650 mg  650 mg Oral Q6H PRN    Or    acetaminophen (TYLENOL) suppository 650 mg  650 mg Rectal Q6H PRN    polyethylene glycol (MIRALAX) packet 17 g  17 g Oral DAILY PRN    [Held by provider] enoxaparin (LOVENOX) injection 40 mg  40 mg SubCUTAneous DAILY    ondansetron (ZOFRAN) injection 4 mg  4 mg IntraVENous Q6H PRN    aspirin chewable tablet 81 mg  81 mg Oral DAILY    [Held by provider] metoprolol tartrate (LOPRESSOR) tablet 25 mg  25 mg Oral BID    [Held by provider] atorvastatin (LIPITOR) tablet 10 mg  10 mg Oral DAILY    hydrALAZINE (APRESOLINE) 20 mg/mL injection 20 mg  20 mg IntraVENous Q6H PRN    propofol (DIPRIVAN) 10 mg/mL infusion  0-50 mcg/kg/min IntraVENous TITRATE       Telemetry: []        Sinus []        A-flutter []        Paced    []        A-fib []        Multiple PVCs                  Labs:  Recent Labs     07/28/20 0317 07/27/20 0442 07/26/20  0534   WBC 9.5 9.2 8.0   HGB 13.3 13.6 14.1   HCT 43.1 44.2 46.4   * 128* 120*     Recent Labs     07/28/20 0317 07/27/20 0442 07/26/20  0534   * 138 141   K 3.3* 3.2* 3.3*   CL 95* 93* 96*   CO2 33* 41* 42*   * 93 133*   BUN 22* 25* 27*   CREA 0.93 1.03* 1.10*   CA 8.7 8.5 9.1   MG 2.1  --   --    PHOS 3.2  --   --    ALB 2.1* 2.3* 2.5*   TBILI 2.4* 2.2* 1.8*   ALT 17 16 17   INR  --   --  1.1     No results for input(s): PH, PCO2, PO2, HCO3, FIO2 in the last 72 hours.     Imaging:  [x]      I have personally reviewed the patients radiographs     []      No change from prior, tubes and lines in adequate position  []      Improved   []      Worsening     IMPRESSION:     The patient is: []       acutely ill Risk of deterioration: []       moderate    []       critically ill  []       high     Active Problems:    COVID-19 ruled out (7/24/2020)          ·   []      Total critical care time exclusive of procedures   35    minutes  Lam Nicole MD

## 2020-07-28 NOTE — PROGRESS NOTES
Pharmacy Automatic Renal Dosing Protocol - Antimicrobials    Indication for Antimicrobials: sepsis, CAP    Current Regimen of Each Antimicrobial:  Cefepime 2g IV q12h (Start Date ; Day # 2)  Vancomycin - pharmacy to dose; started ; day 1     Previous Antimicrobial Therapy:  None    Significant Cultures:   COVID negative x 2   resp panel - negative (final)   resp panel - staph aureus - pending   paired bcx (pending)    Radiology / Imaging results: (X-ray, CT scan or MRI):    chest XR  IMPRESSION: Mild pulmonary edema which is improved. Small bilateral pleural  effusions slightly increased in the right with some improvement on the left. Paralysis, amputations, malnutrition: none noted    Labs:  Recent Labs     20  0317 20  0442 20  0534   CREA 0.93 1.03* 1.10*   BUN 22* 25* 27*   WBC 9.5 9.2 8.0     Temp (24hrs), Av.5 °F (37.5 °C), Min:98.4 °F (36.9 °C), Max:100.5 °F (38.1 °C)    Creatinine Clearance (mL/min) or Dialysis: 53.2 mL/min (IBW)     Impression/Plan:   Scr improved  Vancomycin 2000 mg loading dose then 1750 mg q18h  Anticipated trough 15.2  Continue current dose of cefepime; appropriate for indication/renal function   Antimicrobial stop date TBD     Pharmacy will follow daily and adjust medications as appropriate for renal function and/or serum levels.     Thank you,  HIRAL Merrill

## 2020-07-29 LAB
ALBUMIN SERPL-MCNC: 2 G/DL (ref 3.5–5)
ALBUMIN/GLOB SERPL: 0.4 {RATIO} (ref 1.1–2.2)
ALP SERPL-CCNC: 68 U/L (ref 45–117)
ALT SERPL-CCNC: 18 U/L (ref 12–78)
ANION GAP SERPL CALC-SCNC: 3 MMOL/L (ref 5–15)
ARTERIAL PATENCY WRIST A: YES
ARTERIAL PATENCY WRIST A: YES
AST SERPL-CCNC: 22 U/L (ref 15–37)
BACTERIA SPEC CULT: ABNORMAL
BACTERIA SPEC CULT: ABNORMAL
BASE EXCESS BLD CALC-SCNC: 10 MMOL/L
BASE EXCESS BLD CALC-SCNC: 4 MMOL/L
BASOPHILS # BLD: 0 K/UL (ref 0–0.1)
BASOPHILS NFR BLD: 0 % (ref 0–1)
BDY SITE: ABNORMAL
BDY SITE: ABNORMAL
BILIRUB SERPL-MCNC: 1.7 MG/DL (ref 0.2–1)
BUN SERPL-MCNC: 22 MG/DL (ref 6–20)
BUN/CREAT SERPL: 28 (ref 12–20)
CA-I BLD-SCNC: 1.19 MMOL/L (ref 1.12–1.32)
CA-I BLD-SCNC: 1.26 MMOL/L (ref 1.12–1.32)
CALCIUM SERPL-MCNC: 8.5 MG/DL (ref 8.5–10.1)
CHLORIDE SERPL-SCNC: 98 MMOL/L (ref 97–108)
CO2 SERPL-SCNC: 32 MMOL/L (ref 21–32)
CREAT SERPL-MCNC: 0.8 MG/DL (ref 0.55–1.02)
D DIMER PPP FEU-MCNC: 1.31 MG/L FEU (ref 0–0.65)
DIFFERENTIAL METHOD BLD: ABNORMAL
EOSINOPHIL # BLD: 0.2 K/UL (ref 0–0.4)
EOSINOPHIL NFR BLD: 2 % (ref 0–7)
ERYTHROCYTE [DISTWIDTH] IN BLOOD BY AUTOMATED COUNT: 14.5 % (ref 11.5–14.5)
FIBRINOGEN PPP-MCNC: 769 MG/DL (ref 200–475)
GAS FLOW.O2 O2 DELIVERY SYS: ABNORMAL L/MIN
GAS FLOW.O2 O2 DELIVERY SYS: ABNORMAL L/MIN
GAS FLOW.O2 SETTING OXYMISER: 6 BPM
GLOBULIN SER CALC-MCNC: 4.8 G/DL (ref 2–4)
GLUCOSE BLD STRIP.AUTO-MCNC: 103 MG/DL (ref 65–100)
GLUCOSE BLD STRIP.AUTO-MCNC: 111 MG/DL (ref 65–100)
GLUCOSE BLD STRIP.AUTO-MCNC: 88 MG/DL (ref 65–100)
GLUCOSE BLD STRIP.AUTO-MCNC: 97 MG/DL (ref 65–100)
GLUCOSE SERPL-MCNC: 121 MG/DL (ref 65–100)
GRAM STN SPEC: ABNORMAL
HCO3 BLD-SCNC: 30.5 MMOL/L (ref 22–26)
HCO3 BLD-SCNC: 35.4 MMOL/L (ref 22–26)
HCT VFR BLD AUTO: 42.4 % (ref 35–47)
HGB BLD-MCNC: 12.8 G/DL (ref 11.5–16)
IMM GRANULOCYTES # BLD AUTO: 0.1 K/UL (ref 0–0.04)
IMM GRANULOCYTES NFR BLD AUTO: 1 % (ref 0–0.5)
LYMPHOCYTES # BLD: 0.8 K/UL (ref 0.8–3.5)
LYMPHOCYTES NFR BLD: 10 % (ref 12–49)
MCH RBC QN AUTO: 30.8 PG (ref 26–34)
MCHC RBC AUTO-ENTMCNC: 30.2 G/DL (ref 30–36.5)
MCV RBC AUTO: 102.2 FL (ref 80–99)
MONOCYTES # BLD: 0.7 K/UL (ref 0–1)
MONOCYTES NFR BLD: 8 % (ref 5–13)
NEUTS SEG # BLD: 6.9 K/UL (ref 1.8–8)
NEUTS SEG NFR BLD: 79 % (ref 32–75)
NRBC # BLD: 0 K/UL (ref 0–0.01)
NRBC BLD-RTO: 0 PER 100 WBC
O2/TOTAL GAS SETTING VFR VENT: 50 %
O2/TOTAL GAS SETTING VFR VENT: 50 %
PCO2 BLD: 63.3 MMHG (ref 35–45)
PCO2 BLD: 65.9 MMHG (ref 35–45)
PEEP RESPIRATORY: 6 CMH2O
PEEP RESPIRATORY: 6 CMH2O
PH BLD: 7.29 [PH] (ref 7.35–7.45)
PH BLD: 7.34 [PH] (ref 7.35–7.45)
PLATELET # BLD AUTO: 128 K/UL (ref 150–400)
PMV BLD AUTO: 12.6 FL (ref 8.9–12.9)
PO2 BLD: 136 MMHG (ref 80–100)
PO2 BLD: 77 MMHG (ref 80–100)
POTASSIUM SERPL-SCNC: 3.9 MMOL/L (ref 3.5–5.1)
PRESSURE SUPPORT SETTING VENT: 10 CMH2O
PRESSURE SUPPORT SETTING VENT: 6 CMH2O
PROT SERPL-MCNC: 6.8 G/DL (ref 6.4–8.2)
RBC # BLD AUTO: 4.15 M/UL (ref 3.8–5.2)
SAO2 % BLD: 94 % (ref 92–97)
SAO2 % BLD: 99 % (ref 92–97)
SERVICE CMNT-IMP: ABNORMAL
SERVICE CMNT-IMP: NORMAL
SERVICE CMNT-IMP: NORMAL
SODIUM SERPL-SCNC: 133 MMOL/L (ref 136–145)
SPECIMEN TYPE: ABNORMAL
SPECIMEN TYPE: ABNORMAL
TOTAL RESP. RATE, ITRR: 22
VENTILATION MODE VENT: ABNORMAL
VENTILATION MODE VENT: ABNORMAL
VT SETTING VENT: 480 ML
WBC # BLD AUTO: 8.7 K/UL (ref 3.6–11)

## 2020-07-29 PROCEDURE — 74011250636 HC RX REV CODE- 250/636: Performed by: INTERNAL MEDICINE

## 2020-07-29 PROCEDURE — 74011250637 HC RX REV CODE- 250/637: Performed by: INTERNAL MEDICINE

## 2020-07-29 PROCEDURE — 36600 WITHDRAWAL OF ARTERIAL BLOOD: CPT

## 2020-07-29 PROCEDURE — 94003 VENT MGMT INPAT SUBQ DAY: CPT

## 2020-07-29 PROCEDURE — 74011000250 HC RX REV CODE- 250: Performed by: INTERNAL MEDICINE

## 2020-07-29 PROCEDURE — 65610000006 HC RM INTENSIVE CARE

## 2020-07-29 PROCEDURE — 82962 GLUCOSE BLOOD TEST: CPT

## 2020-07-29 PROCEDURE — 36415 COLL VENOUS BLD VENIPUNCTURE: CPT

## 2020-07-29 PROCEDURE — 85384 FIBRINOGEN ACTIVITY: CPT

## 2020-07-29 PROCEDURE — 82803 BLOOD GASES ANY COMBINATION: CPT

## 2020-07-29 PROCEDURE — 85025 COMPLETE CBC W/AUTO DIFF WBC: CPT

## 2020-07-29 PROCEDURE — 74011000258 HC RX REV CODE- 258: Performed by: INTERNAL MEDICINE

## 2020-07-29 PROCEDURE — 80053 COMPREHEN METABOLIC PANEL: CPT

## 2020-07-29 PROCEDURE — 85379 FIBRIN DEGRADATION QUANT: CPT

## 2020-07-29 RX ORDER — FUROSEMIDE 10 MG/ML
40 INJECTION INTRAMUSCULAR; INTRAVENOUS ONCE
Status: COMPLETED | OUTPATIENT
Start: 2020-07-29 | End: 2020-07-29

## 2020-07-29 RX ORDER — POTASSIUM CHLORIDE 29.8 MG/ML
20 INJECTION INTRAVENOUS
Status: COMPLETED | OUTPATIENT
Start: 2020-07-29 | End: 2020-07-29

## 2020-07-29 RX ORDER — BUMETANIDE 0.25 MG/ML
1 INJECTION INTRAMUSCULAR; INTRAVENOUS ONCE
Status: COMPLETED | OUTPATIENT
Start: 2020-07-29 | End: 2020-07-29

## 2020-07-29 RX ORDER — DEXTROSE MONOHYDRATE AND SODIUM CHLORIDE 5; .45 G/100ML; G/100ML
25 INJECTION, SOLUTION INTRAVENOUS CONTINUOUS
Status: DISCONTINUED | OUTPATIENT
Start: 2020-07-29 | End: 2020-08-03

## 2020-07-29 RX ORDER — LANOLIN ALCOHOL/MO/W.PET/CERES
6 CREAM (GRAM) TOPICAL ONCE
Status: COMPLETED | OUTPATIENT
Start: 2020-07-29 | End: 2020-07-29

## 2020-07-29 RX ADMIN — Medication 10 ML: at 13:55

## 2020-07-29 RX ADMIN — CASTOR OIL AND BALSAM, PERU: 788; 87 OINTMENT TOPICAL at 04:05

## 2020-07-29 RX ADMIN — MICONAZOLE NITRATE: 20 CREAM TOPICAL at 17:26

## 2020-07-29 RX ADMIN — CLOTRIMAZOLE: 10 CREAM TOPICAL at 08:10

## 2020-07-29 RX ADMIN — Medication 1 AMPULE: at 08:03

## 2020-07-29 RX ADMIN — Medication 10 ML: at 07:05

## 2020-07-29 RX ADMIN — CASTOR OIL AND BALSAM, PERU: 788; 87 OINTMENT TOPICAL at 17:26

## 2020-07-29 RX ADMIN — VANCOMYCIN HYDROCHLORIDE 1750 MG: 10 INJECTION, POWDER, LYOPHILIZED, FOR SOLUTION INTRAVENOUS at 05:59

## 2020-07-29 RX ADMIN — DEXTROSE MONOHYDRATE 50 ML/HR: 5 INJECTION, SOLUTION INTRAVENOUS at 03:52

## 2020-07-29 RX ADMIN — CEFEPIME HYDROCHLORIDE 2 G: 2 INJECTION, POWDER, FOR SOLUTION INTRAVENOUS at 12:10

## 2020-07-29 RX ADMIN — FAMOTIDINE 20 MG: 10 INJECTION, SOLUTION INTRAVENOUS at 21:26

## 2020-07-29 RX ADMIN — POTASSIUM CHLORIDE 20 MEQ: 29.8 INJECTION, SOLUTION INTRAVENOUS at 10:20

## 2020-07-29 RX ADMIN — FUROSEMIDE 40 MG: 10 INJECTION, SOLUTION INTRAMUSCULAR; INTRAVENOUS at 17:27

## 2020-07-29 RX ADMIN — Medication 1 AMPULE: at 21:27

## 2020-07-29 RX ADMIN — POTASSIUM CHLORIDE 20 MEQ: 29.8 INJECTION, SOLUTION INTRAVENOUS at 09:19

## 2020-07-29 RX ADMIN — CEFEPIME HYDROCHLORIDE 2 G: 2 INJECTION, POWDER, FOR SOLUTION INTRAVENOUS at 23:29

## 2020-07-29 RX ADMIN — CHLORHEXIDINE GLUCONATE 15 ML: 0.12 RINSE ORAL at 21:27

## 2020-07-29 RX ADMIN — FAMOTIDINE 20 MG: 10 INJECTION, SOLUTION INTRAVENOUS at 08:06

## 2020-07-29 RX ADMIN — CLOTRIMAZOLE: 10 CREAM TOPICAL at 17:26

## 2020-07-29 RX ADMIN — Medication 40 ML: at 21:28

## 2020-07-29 RX ADMIN — DEXTROSE MONOHYDRATE AND SODIUM CHLORIDE 50 ML/HR: 5; .45 INJECTION, SOLUTION INTRAVENOUS at 10:23

## 2020-07-29 RX ADMIN — DEXMEDETOMIDINE HYDROCHLORIDE 0.4 MCG/KG/HR: 100 INJECTION, SOLUTION, CONCENTRATE INTRAVENOUS at 02:21

## 2020-07-29 RX ADMIN — ASPIRIN 81 MG CHEWABLE TABLET 81 MG: 81 TABLET CHEWABLE at 08:09

## 2020-07-29 RX ADMIN — ACETAMINOPHEN 650 MG: 325 TABLET ORAL at 19:43

## 2020-07-29 RX ADMIN — MICONAZOLE NITRATE: 20 CREAM TOPICAL at 08:10

## 2020-07-29 RX ADMIN — MELATONIN 6 MG: at 21:27

## 2020-07-29 RX ADMIN — BUMETANIDE 1 MG: 0.25 INJECTION INTRAMUSCULAR; INTRAVENOUS at 09:21

## 2020-07-29 RX ADMIN — ONDANSETRON 4 MG: 2 INJECTION INTRAMUSCULAR; INTRAVENOUS at 23:41

## 2020-07-29 RX ADMIN — CHLORHEXIDINE GLUCONATE 15 ML: 0.12 RINSE ORAL at 08:06

## 2020-07-29 NOTE — ROUTINE PROCESS
Failed SBT >  More acidotic > low tidal volume on SIMV PS of 10, Max Temp 100 axillary. Scant tan secretion at am, soft BP, adequate UOP, Bedside and Verbal shift change report given to [facility], RN (oncoming nurse) by Ting Huffman RN (offgoing nurse). Report included the following information SBAR, Kardex, Intake/Output, MAR, Accordion, Recent Results, Med Rec Status and Cardiac Rhythm NSR.

## 2020-07-29 NOTE — PROGRESS NOTES
Placed patient on Spont. Breathing Trial Per Dr Felicitas Otero verbal order. PS10, PEEP 6, Fio2 50. Patient RSBI >120. Placed Patient back on SIMV 6, , PS 10, PEEP 6, FiO2 50%.

## 2020-07-29 NOTE — PROGRESS NOTES
Placed patient back on Spontaneous breathing trial 6ps/6peep/50% Fio2 per Dr. Ling Castillo verbal order.  aware of RSBI >130.

## 2020-07-29 NOTE — PROGRESS NOTES
PULMONARY/Critical Care/SLEEP MEDICINE  [facility]  Name: Snow Mckeon   : 1960   MRN: [id number]   Date: 2020 9:33 AM       []      The patient is critically ill on      [x]      Mechanical ventilation []      Pressors   []      BiPAP []         Problems    1. Acute respiratory failure with hypercarbia, hypoxia  2. Alkalosis from diuretics  3. Staph bronchitis vs early bronchopneumonia  4. CHF ? Systolic/diastolic? 5. Hypotension ? Sedation related  6. Ventilator dependence  7. obesity  8. High suspicion JOSLYN  9. Hypercapnea  10. H/o tobacco   11. Asthma hx         Plan:      Try SBt again. Will consider extubation to BIPAP in AM  Correct alkalosis  Diuresis  Full ventilator support-close to extubation  Diuretics as tolerated  Daily SAt, SBt  brochial hygine  Culture  Sputum  Empiric abx  DVT ppx  ICU protocols  I/O  Outpatient PFT  Outpatient sleep study     tires easily and becomes sleepy. Failed SBt due to same behavior. High RSBI. Never had sleep study      failed SBT due to apnea. culture probable staph       thick secretions. Alert on vent. Alkalotic. Failed SBT this weeekkend           Alert .  Rapid shallow breathing on PSV PP             Vital Signs:    Visit Vitals  /63   Pulse 84   Temp 99.1 °F (37.3 °C)   Resp 22   Ht 5' 3\" (1.6 m)   Wt 150.1 kg (330 lb 14.6 oz)   SpO2 94%   BMI 58.62 kg/m²       O2 Device: Ventilator   O2 Flow Rate (L/min): 15 l/min   Temp (24hrs), Av.4 °F (37.4 °C), Min:99 °F (37.2 °C), Max:100 °F (37.8 °C)       Intake/Output:   Last shift:      701 - 1900  In: 645.8 [I.V.:595.8]  Out: 2760 [Urine:2635]  Last 3 shifts: 1901 -  0700  In: 3934.4 [I.V.:3554.4]  Out: 8647 [Urine:2505]    Intake/Output Summary (Last 24 hours) at 2020 1445  Last data filed at 2020 1400  Gross per 24 hour   Intake 2930.71 ml   Output 4155 ml   Net -1224.29 ml     Hemodynamics:   PAP:     Wedge:     CVP: CVP (mmHg): 15 mmHg (07/29/20 1400)   CO:     CI:     SVR:     PVR:        Ventilator Settings:  Ventilator Mode: Spontaneous  Respiratory Rate  Resp Rate Observed: 29  Back-Up Rate: 6  Insp Flow (l/min): 60 l/min  I:E Ratio: 1:4.8  Ventilator Volumes  Vt Set (ml): 480 ml  Vt Exhaled (Machine Breath) (ml): 409 ml  Vt Spont (ml): 457 ml  Ve Observed (l/min): 5.83 l/min  Ventilator Pressures  PC Set: 480  Pressure Support (cm H2O): 6 cm H2O  PIP Observed (cm H2O): 27 cm H2O  Plateau Pressure (cm H2O): 21 cm H2O  MAP (cm H2O): 13  PEEP/VENT (cm H2O): 6 cm H20  Auto PEEP Observed (cm H2O): 0 cm H2O    Physical Exam:    General: [x]      Intubated/sedated []      No acute distress []          []      Ill appearing []       []            HEENT: [x]     ETT []      PERRL []     NGT     []      Anicteric Mucosa:[]      moist   []      dry []            Resp: [x]      Wheeze []      Clear to ascultation bilaterally []      Accessory muscle use    []      Rales []      Chest tube:  []      Air leak []          []      Ronchi []      Crepitus []         []      Coarse bilateral ventilator breath sounds      CV: [x]      Regular []      Tachycardia []          []      Irregular []      Bradycardic []          []      Murmur []      S1 []          []     RUB []    S2 []            GI: [x]      Soft  []      NG Tube Bowel sounds: []      present []      absent    []      Firm []      PEG []    Tender      []      Distended  []            : []      Petersen []      Hematuria []          []      Clear urine []       []            MSK:    []      SCDs []      Edema []   clubbing       []      No deformities []     Cyanosis  []            Skin: []      Warm [x]      Dry  []   Mottled       []      Cool []      Moist []          []     Lesions []      Diaphoretic []          []      Rash  []      Cyanosis []            N-psych: []      Sedated  []      Agitated []     Moves all extremities     [x]      Alert  Follows commands:   [x] Yes  []      No []     oriented       Devices: []      PA Catheter []      Tracheostomy []          []      Central Line []        []        Chest tube:  Air leak? []        Y/[]        N    []          []      PICC []       []            DATA:   Current Facility-Administered Medications   Medication Dose Route Frequency    dextrose 5 % - 0.45% NaCl infusion  50 mL/hr IntraVENous CONTINUOUS    clotrimazole (LOTRIMIN) 1 % cream   Topical BID    miconazole (SECURA) 2 % extra thick cream   Topical BID    vancomycin (VANCOCIN) 1750 mg in  ml infusion  1,750 mg IntraVENous Q18H    phenol throat spray (CHLORASEPTIC) 1 Spray  1 Spray Oral PRN    chlorhexidine (ORAL CARE KIT) 0.12 % mouthwash 15 mL  15 mL Oral Q12H    famotidine (PF) (PEPCID) 20 mg in 0.9% sodium chloride 10 mL injection  20 mg IntraVENous Q12H    cefepime (MAXIPIME) 2 g in 0.9% sodium chloride (MBP/ADV) 100 mL  2 g IntraVENous Q12H    balsam peru-castor oiL (VENELEX) ointment   Topical Q12H    alcohol 62% (NOZIN) nasal  1 Ampule  1 Ampule Topical Q12H    insulin lispro (HUMALOG) injection   SubCUTAneous Q6H    glucose chewable tablet 16 g  4 Tab Oral PRN    dextrose (D50W) injection syrg 12.5-25 g  12.5-25 g IntraVENous PRN    glucagon (GLUCAGEN) injection 1 mg  1 mg IntraMUSCular PRN    sodium chloride (NS) flush 5-40 mL  5-40 mL IntraVENous Q8H    sodium chloride (NS) flush 5-40 mL  5-40 mL IntraVENous PRN    acetaminophen (TYLENOL) tablet 650 mg  650 mg Oral Q6H PRN    Or    acetaminophen (TYLENOL) suppository 650 mg  650 mg Rectal Q6H PRN    polyethylene glycol (MIRALAX) packet 17 g  17 g Oral DAILY PRN    [Held by provider] enoxaparin (LOVENOX) injection 40 mg  40 mg SubCUTAneous DAILY    ondansetron (ZOFRAN) injection 4 mg  4 mg IntraVENous Q6H PRN    aspirin chewable tablet 81 mg  81 mg Oral DAILY    [Held by provider] metoprolol tartrate (LOPRESSOR) tablet 25 mg  25 mg Oral BID    [Held by provider] atorvastatin (LIPITOR) tablet 10 mg  10 mg Oral DAILY    hydrALAZINE (APRESOLINE) 20 mg/mL injection 20 mg  20 mg IntraVENous Q6H PRN       Telemetry: []        Sinus []        A-flutter []        Paced    []        A-fib []        Multiple PVCs                  Labs:  Recent Labs     07/29/20 0358 07/28/20 0317 07/27/20  0442   WBC 8.7 9.5 9.2   HGB 12.8 13.3 13.6   HCT 42.4 43.1 44.2   * 130* 128*     Recent Labs     07/29/20 0358 07/28/20 0317 07/27/20  0442   * 134* 138   K 3.9 3.3* 3.2*   CL 98 95* 93*   CO2 32 33* 41*   * 117* 93   BUN 22* 22* 25*   CREA 0.80 0.93 1.03*   CA 8.5 8.7 8.5   MG  --  2.1  --    PHOS  --  3.2  --    ALB 2.0* 2.1* 2.3*   TBILI 1.7* 2.4* 2.2*   ALT 18 17 16     No results for input(s): PH, PCO2, PO2, HCO3, FIO2 in the last 72 hours.     Imaging:  [x]      I have personally reviewed the patients radiographs     []      No change from prior, tubes and lines in adequate position  []      Improved   []      Worsening     IMPRESSION:     The patient is: []       acutely ill Risk of deterioration: []       moderate    []       critically ill  []       high     Active Problems:    COVID-19 ruled out (7/24/2020)          ·   []      Total critical care time exclusive of procedures   35    minutes  Sindy Remy MD

## 2020-07-29 NOTE — PROGRESS NOTES
07/29/20 0547 07/29/20 0601   ABCDEF Bundle   SBT Safety Screen Passed Yes  --    SBT Trial Passed Yes  --    Weaning Parameters   Spontaneous Breathing Trial Complete  --  Yes   Resp Rate Observed 30 29   Ve 5.1 5.8    201   RSBI 89 90

## 2020-07-29 NOTE — PROGRESS NOTES
OT Note:    Chart reviewed and spoke with RN. Pt currently on SBT, therefore therapy will follow up with Pt for Occupational Therapy tomorrow.     [facility] [place], OTR/L

## 2020-07-29 NOTE — PROGRESS NOTES
0700: Bedside and Verbal shift change report given to Sophia (oncoming nurse) by Joya Cushing (offgoing nurse). Report included the following information SBAR, Kardex, Intake/Output, MAR, Recent Results, Cardiac Rhythm NSR, Alarm Parameters  and Quality Measures. 1000: Rouds completed, see orders. 1200: Pt reassessed, see flowsheets. 1500: Pt remains in CPAP mode on vent. Per MD Ramila Gomez pt is to remain on these settings until 8pm when a ABG will be recheked. If all goes well pt should be extubated in the AM    1600: Pt reassessed, see flowsheets    1700: Pt bathed    1900: Bedside and Verbal shift change report given to Neo (oncoming nurse) by Sophia (offgoing nurse). Report included the following information SBAR, Kardex, Intake/Output, MAR, Recent Results, Cardiac Rhythm NSR, Alarm Parameters  and Procedure Verification.

## 2020-07-29 NOTE — PROGRESS NOTES
Hospitalist Progress Note    NAME: Petey Ordonez   :  1960   MRN:  [id number]     Admit date: 2020    Today's date: 20    PCP: MOISE Small M.D. Cell [phone]    Assessment / Plan:  Acute respiratory failure with hypoxia and hypercarbia POA  Probable acute on chronic diastolic CHF POA LVEF 41-76%  ? Acute bronchitis POA   Underlying JOSLYN or OHS POA with Body mass index is 58.58 kg/m².   Metabolic encephalopathy POA due to hypercarbia   COVID-19 rule out POA testing negative x 2  Baseline does not wear home o2, admits to chronic CHOPRA  Past few days worsening CHOPRA and SOB at rest  Positive LE edema and orthopnea  No fevers, positive dry cough  HCO3 elevated at admit 36, suspect some chronic CO2 retention at baseline  CXR with bilateral interstitial edema   pBNP 2,987  procalcitonin < 0.05  Differential; Acute left CHF, atypical pneumonia    Pulm consult, on SBT trial , doing better   awake and interactive, following commands on vent  No clear pneumonia, pulm added antibiotics, sputum culture showed MSSA   Will DC vanco and c/w cefipime     Thrombocytopenia  PLTs dropped from 210,000 to 120,000, improved at [id number]   unlikely POP  Recheck in AM  Normal coags, fibrinogen     Essential HTN POA  Hyperlipidemia POA  Holding metoprolol and lipitor with intubation, borderline low BP  PRN labetalol  LDL 55,      DM type 2 POA  Not currently on treatment  POC, SSI  HgBa1c 6.0%     Remote tobacco use quit 40 years ago     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      DVT prophylaxis with lovenox     Code status: full code  NOK: daughter     Subjective:     Chief Complaint / Reason for Physician Visit f/u acute respiratory failure  Awake , still intubated   Review of Systems:  Symptom Y/N Comments  Symptom Y/N Comments   Fever/Chills    Chest Pain     Poor Appetite    Edema     Cough    Abdominal Pain     Sputum    Joint Pain     SOB/CHOPRA    Headache     Nausea/vomit    Tolerating PT/OT     Diarrhea    Tolerating Diet     Constipation    Other       Could NOT obtain due to: Intubated     Objective:     VITALS:   Last 24hrs VS reviewed since prior progress note.  Most recent are:  Patient Vitals for the past 24 hrs:   Temp Pulse Resp BP SpO2   07/29/20 1200 99.1 °F (37.3 °C) 77 29 103/56    07/29/20 1122  69 19  99 %   07/29/20 1118  70      07/29/20 1100  68 23 106/52 97 %   07/29/20 1000  64 20 108/55 97 %   07/29/20 0900  65 19 100/48 97 %   07/29/20 0800 99 °F (37.2 °C) 63 22 95/54 97 %   07/29/20 0750  64 (!) 33  97 %   07/29/20 0700  65 20 98/41 95 %   07/29/20 0600  66 26 101/49 94 %   07/29/20 0500  65 20 117/51 97 %   07/29/20 0400 99.9 °F (37.7 °C) 64 14 107/51 97 %   07/29/20 0336  65 16  97 %   07/29/20 0300  65 16 106/53 97 %   07/29/20 0230 99.6 °F (37.6 °C) 65 23  96 %   07/29/20 0200  65 26 103/52 97 %   07/29/20 0100  66 19 103/54 96 %   07/29/20 0000 100 °F (37.8 °C) 67 25 101/52 95 %   07/28/20 2330  67 24  96 %   07/28/20 2319  70 25  98 %   07/28/20 2300  70 18 95/45 95 %   07/28/20 2200  66 19 99/50 98 %   07/28/20 2100  67 18 101/50 98 %   07/28/20 2000  70 19 97/57 97 %   07/28/20 1949  69 20  97 %   07/28/20 1930 99.5 °F (37.5 °C) 69 22 94/49 97 %   07/28/20 1900  70 24 94/49 97 %   07/28/20 1800  72 20 (!) 108/35 96 %   07/28/20 1700  69 26 109/53 92 %   07/28/20 1600 99 °F (37.2 °C) 67 27 105/58 93 %   07/28/20 1546  63 29  95 %   07/28/20 1500  65 12 104/49 97 %   07/28/20 1400  65 14 (!) 121/92 96 %   07/28/20 1300  62 15 99/53 95 %       Intake/Output Summary (Last 24 hours) at 7/29/2020 1238  Last data filed at 7/29/2020 1200  Gross per 24 hour   Intake 2579.88 ml   Output 3795 ml   Net -1215.12 ml        Wt Readings from Last 12 Encounters:   07/28/20 150.1 kg (330 lb 14.6 oz)   12/27/19 136.1 kg (300 lb)   01/30/19 136.1 kg (300 lb)   11/13/15 134.7 kg (297 lb)   03/17/15 140.1 kg (308 lb 13.8 oz)       PHYSICAL EXAM:  General: WD, WN. Intubated, alert  EENT:  PERRL. Anicteric sclerae. ETT in place  Resp:  Crackles at bases bilaterally, no wheezing. No accessory muscle use  CV:  Regular  rhythm,  1+ edema  GI:  Soft, Non distended, Non tender. +Bowel sounds, no rebound  LN:  No cervical or inguinal MONICA  Neurologic:  Intubated, alert and following commands  Psych:    Not anxious nor agitated  Skin:  No rashes. No jaundice    Reviewed most current lab test results and cultures  YES  Reviewed most current radiology test results   YES  Review and summation of old records today    NO  Reviewed patient's current orders and MAR    YES  PMH/ reviewed - no change compared to H&P  ________________________________________________________________________  Care Plan discussed with:    Comments   Patient x    Family      RN x    Care Manager     Consultant                        Multidiciplinary team rounds were held today with , nursing, pharmacist and clinical coordinator. Patient's plan of care was discussed; medications were reviewed and discharge planning was addressed. ________________________________________________________________________      Comments   >50% of visit spent in counseling and coordination of care x    ________________________________________________________________________  Shayna Nichole MD     Procedures: see electronic medical records for all procedures/Xrays and details which were not copied into this note but were reviewed prior to creation of Plan. LABS:  I reviewed today's most current labs and imaging studies.   Pertinent labs include:  Recent Labs     07/29/20  0358 07/28/20  0317 07/27/20  0442   WBC 8.7 9.5 9.2   HGB 12.8 13.3 13.6   HCT 42.4 43.1 44.2   * 130* 128*     Recent Labs 07/29/20  0358 07/28/20  0317 07/27/20  0442   * 134* 138   K 3.9 3.3* 3.2*   CL 98 95* 93*   CO2 32 33* 41*   * 117* 93   BUN 22* 22* 25*   CREA 0.80 0.93 1.03*   CA 8.5 8.7 8.5   MG  --  2.1  --    PHOS  --  3.2  --    ALB 2.0* 2.1* 2.3*   TBILI 1.7* 2.4* 2.2*   ALT 18 17 16

## 2020-07-29 NOTE — PROGRESS NOTES
Chart reviewed and discussed with RN. Pt on SBT and will hold PT at this time for pt to focus on that and not over exert. Will follow back tomorrow.

## 2020-07-30 ENCOUNTER — APPOINTMENT (OUTPATIENT)
Dept: GENERAL RADIOLOGY | Age: 60
DRG: 207 | End: 2020-07-30
Attending: INTERNAL MEDICINE
Payer: MEDICARE

## 2020-07-30 LAB
ALBUMIN SERPL-MCNC: 2.3 G/DL (ref 3.5–5)
ALBUMIN/GLOB SERPL: 0.5 {RATIO} (ref 1.1–2.2)
ALP SERPL-CCNC: 80 U/L (ref 45–117)
ALT SERPL-CCNC: 22 U/L (ref 12–78)
ANION GAP SERPL CALC-SCNC: 7 MMOL/L (ref 5–15)
AST SERPL-CCNC: 28 U/L (ref 15–37)
ATRIAL RATE: 147 BPM
BASOPHILS # BLD: 0 K/UL (ref 0–0.1)
BASOPHILS NFR BLD: 0 % (ref 0–1)
BILIRUB SERPL-MCNC: 1.7 MG/DL (ref 0.2–1)
BUN SERPL-MCNC: 20 MG/DL (ref 6–20)
BUN/CREAT SERPL: 25 (ref 12–20)
CALCIUM SERPL-MCNC: 8.8 MG/DL (ref 8.5–10.1)
CALCULATED R AXIS, ECG10: 66 DEGREES
CALCULATED T AXIS, ECG11: -9 DEGREES
CHLORIDE SERPL-SCNC: 95 MMOL/L (ref 97–108)
CO2 SERPL-SCNC: 32 MMOL/L (ref 21–32)
CREAT SERPL-MCNC: 0.79 MG/DL (ref 0.55–1.02)
D DIMER PPP FEU-MCNC: 1.64 MG/L FEU (ref 0–0.65)
DIAGNOSIS, 93000: NORMAL
DIFFERENTIAL METHOD BLD: ABNORMAL
EOSINOPHIL # BLD: 0.1 K/UL (ref 0–0.4)
EOSINOPHIL NFR BLD: 1 % (ref 0–7)
ERYTHROCYTE [DISTWIDTH] IN BLOOD BY AUTOMATED COUNT: 14 % (ref 11.5–14.5)
FIBRINOGEN PPP-MCNC: 633 MG/DL (ref 200–475)
GLOBULIN SER CALC-MCNC: 4.9 G/DL (ref 2–4)
GLUCOSE BLD STRIP.AUTO-MCNC: 102 MG/DL (ref 65–100)
GLUCOSE BLD STRIP.AUTO-MCNC: 127 MG/DL (ref 65–100)
GLUCOSE BLD STRIP.AUTO-MCNC: 97 MG/DL (ref 65–100)
GLUCOSE SERPL-MCNC: 107 MG/DL (ref 65–100)
HCT VFR BLD AUTO: 43.8 % (ref 35–47)
HGB BLD-MCNC: 13.6 G/DL (ref 11.5–16)
IMM GRANULOCYTES # BLD AUTO: 0 K/UL (ref 0–0.04)
IMM GRANULOCYTES NFR BLD AUTO: 0 % (ref 0–0.5)
LYMPHOCYTES # BLD: 0.6 K/UL (ref 0.8–3.5)
LYMPHOCYTES NFR BLD: 6 % (ref 12–49)
MAGNESIUM SERPL-MCNC: 2 MG/DL (ref 1.6–2.4)
MCH RBC QN AUTO: 31.4 PG (ref 26–34)
MCHC RBC AUTO-ENTMCNC: 31.1 G/DL (ref 30–36.5)
MCV RBC AUTO: 101.2 FL (ref 80–99)
MONOCYTES # BLD: 0.8 K/UL (ref 0–1)
MONOCYTES NFR BLD: 8 % (ref 5–13)
NEUTS SEG # BLD: 8.7 K/UL (ref 1.8–8)
NEUTS SEG NFR BLD: 84 % (ref 32–75)
NRBC # BLD: 0 K/UL (ref 0–0.01)
NRBC BLD-RTO: 0 PER 100 WBC
PLATELET # BLD AUTO: 150 K/UL (ref 150–400)
PMV BLD AUTO: 13 FL (ref 8.9–12.9)
POTASSIUM SERPL-SCNC: 3.7 MMOL/L (ref 3.5–5.1)
PROT SERPL-MCNC: 7.2 G/DL (ref 6.4–8.2)
Q-T INTERVAL, ECG07: 272 MS
QRS DURATION, ECG06: 68 MS
QTC CALCULATION (BEZET), ECG08: 435 MS
RBC # BLD AUTO: 4.33 M/UL (ref 3.8–5.2)
SERVICE CMNT-IMP: ABNORMAL
SERVICE CMNT-IMP: ABNORMAL
SERVICE CMNT-IMP: NORMAL
SODIUM SERPL-SCNC: 134 MMOL/L (ref 136–145)
TROPONIN I SERPL-MCNC: <0.05 NG/ML
VENTRICULAR RATE, ECG03: 154 BPM
WBC # BLD AUTO: 10.3 K/UL (ref 3.6–11)

## 2020-07-30 PROCEDURE — 65610000006 HC RM INTENSIVE CARE

## 2020-07-30 PROCEDURE — 74011000258 HC RX REV CODE- 258: Performed by: INTERNAL MEDICINE

## 2020-07-30 PROCEDURE — 97530 THERAPEUTIC ACTIVITIES: CPT

## 2020-07-30 PROCEDURE — 83735 ASSAY OF MAGNESIUM: CPT

## 2020-07-30 PROCEDURE — 74011250637 HC RX REV CODE- 250/637: Performed by: INTERNAL MEDICINE

## 2020-07-30 PROCEDURE — 84484 ASSAY OF TROPONIN QUANT: CPT

## 2020-07-30 PROCEDURE — 74011000250 HC RX REV CODE- 250: Performed by: INTERNAL MEDICINE

## 2020-07-30 PROCEDURE — 85379 FIBRIN DEGRADATION QUANT: CPT

## 2020-07-30 PROCEDURE — 93005 ELECTROCARDIOGRAM TRACING: CPT

## 2020-07-30 PROCEDURE — 82962 GLUCOSE BLOOD TEST: CPT

## 2020-07-30 PROCEDURE — 86022 PLATELET ANTIBODIES: CPT

## 2020-07-30 PROCEDURE — 94660 CPAP INITIATION&MGMT: CPT

## 2020-07-30 PROCEDURE — 80053 COMPREHEN METABOLIC PANEL: CPT

## 2020-07-30 PROCEDURE — 85025 COMPLETE CBC W/AUTO DIFF WBC: CPT

## 2020-07-30 PROCEDURE — 74011250636 HC RX REV CODE- 250/636: Performed by: INTERNAL MEDICINE

## 2020-07-30 PROCEDURE — 94003 VENT MGMT INPAT SUBQ DAY: CPT

## 2020-07-30 PROCEDURE — 36415 COLL VENOUS BLD VENIPUNCTURE: CPT

## 2020-07-30 PROCEDURE — 74011250636 HC RX REV CODE- 250/636: Performed by: FAMILY MEDICINE

## 2020-07-30 PROCEDURE — 85384 FIBRINOGEN ACTIVITY: CPT

## 2020-07-30 PROCEDURE — 71045 X-RAY EXAM CHEST 1 VIEW: CPT

## 2020-07-30 PROCEDURE — 97535 SELF CARE MNGMENT TRAINING: CPT

## 2020-07-30 RX ORDER — AMIODARONE HCL/D5W 450 MG/250
0.5 PLASTIC BAG, INJECTION (ML) INTRAVENOUS
Status: DISCONTINUED | OUTPATIENT
Start: 2020-07-30 | End: 2020-07-31

## 2020-07-30 RX ORDER — POTASSIUM CHLORIDE 29.8 MG/ML
20 INJECTION INTRAVENOUS
Status: COMPLETED | OUTPATIENT
Start: 2020-07-30 | End: 2020-07-30

## 2020-07-30 RX ORDER — MORPHINE SULFATE 2 MG/ML
1 INJECTION, SOLUTION INTRAMUSCULAR; INTRAVENOUS ONCE
Status: COMPLETED | OUTPATIENT
Start: 2020-07-30 | End: 2020-07-30

## 2020-07-30 RX ORDER — INSULIN LISPRO 100 [IU]/ML
INJECTION, SOLUTION INTRAVENOUS; SUBCUTANEOUS
Status: DISCONTINUED | OUTPATIENT
Start: 2020-07-31 | End: 2020-08-07 | Stop reason: HOSPADM

## 2020-07-30 RX ADMIN — CHLORHEXIDINE GLUCONATE 15 ML: 0.12 RINSE ORAL at 21:25

## 2020-07-30 RX ADMIN — CASTOR OIL AND BALSAM, PERU: 788; 87 OINTMENT TOPICAL at 05:17

## 2020-07-30 RX ADMIN — ACETAMINOPHEN 650 MG: 325 TABLET ORAL at 00:40

## 2020-07-30 RX ADMIN — CASTOR OIL AND BALSAM, PERU: 788; 87 OINTMENT TOPICAL at 16:46

## 2020-07-30 RX ADMIN — MICONAZOLE NITRATE: 20 CREAM TOPICAL at 17:09

## 2020-07-30 RX ADMIN — POTASSIUM CHLORIDE 20 MEQ: 29.8 INJECTION, SOLUTION INTRAVENOUS at 09:48

## 2020-07-30 RX ADMIN — AMIODARONE HYDROCHLORIDE 0.5 MG/MIN: 50 INJECTION, SOLUTION INTRAVENOUS at 08:02

## 2020-07-30 RX ADMIN — MICONAZOLE NITRATE: 20 CREAM TOPICAL at 08:13

## 2020-07-30 RX ADMIN — FAMOTIDINE 20 MG: 10 INJECTION, SOLUTION INTRAVENOUS at 21:26

## 2020-07-30 RX ADMIN — Medication 10 ML: at 13:51

## 2020-07-30 RX ADMIN — CHLORHEXIDINE GLUCONATE 15 ML: 0.12 RINSE ORAL at 08:12

## 2020-07-30 RX ADMIN — MORPHINE SULFATE 1 MG: 2 INJECTION, SOLUTION INTRAMUSCULAR; INTRAVENOUS at 06:43

## 2020-07-30 RX ADMIN — CLOTRIMAZOLE: 10 CREAM TOPICAL at 17:09

## 2020-07-30 RX ADMIN — AMIODARONE HYDROCHLORIDE 1 MG/MIN: 50 INJECTION, SOLUTION INTRAVENOUS at 01:55

## 2020-07-30 RX ADMIN — PHENOL 1 SPRAY: 1.5 LIQUID ORAL at 08:11

## 2020-07-30 RX ADMIN — Medication 10 ML: at 05:17

## 2020-07-30 RX ADMIN — ASPIRIN 81 MG CHEWABLE TABLET 81 MG: 81 TABLET CHEWABLE at 08:17

## 2020-07-30 RX ADMIN — Medication 1 AMPULE: at 21:25

## 2020-07-30 RX ADMIN — SODIUM CHLORIDE 250 ML: 900 INJECTION, SOLUTION INTRAVENOUS at 00:12

## 2020-07-30 RX ADMIN — WATER 2 G: 1 INJECTION INTRAMUSCULAR; INTRAVENOUS; SUBCUTANEOUS at 13:52

## 2020-07-30 RX ADMIN — POTASSIUM CHLORIDE 20 MEQ: 29.8 INJECTION, SOLUTION INTRAVENOUS at 10:42

## 2020-07-30 RX ADMIN — WATER 2 G: 1 INJECTION INTRAMUSCULAR; INTRAVENOUS; SUBCUTANEOUS at 21:26

## 2020-07-30 RX ADMIN — FAMOTIDINE 20 MG: 10 INJECTION, SOLUTION INTRAVENOUS at 08:13

## 2020-07-30 RX ADMIN — Medication 10 ML: at 21:25

## 2020-07-30 RX ADMIN — Medication 1 AMPULE: at 08:19

## 2020-07-30 RX ADMIN — AMIODARONE HYDROCHLORIDE 150 MG: 1.5 INJECTION, SOLUTION INTRAVENOUS at 01:31

## 2020-07-30 RX ADMIN — CLOTRIMAZOLE: 10 CREAM TOPICAL at 08:13

## 2020-07-30 RX ADMIN — PHENOL 1 SPRAY: 1.5 LIQUID ORAL at 05:22

## 2020-07-30 NOTE — PROGRESS NOTES
[id number]  During turning patient, she started to gag and vomited small green- yellowish, OGT opened to suction, small amount came out, then has SVT, Sinus Tachycardia with PACs, slight soft BP > Zofran 4 mg IV PRN given, patient fee; better and denies CP, palpitation, BP  88/59 mmHg, on call updated,  ml Bolus, CBC, BMP and Mg ordered, to follow up. 0117  Still rhythm about 130-155 > patient denies pain N&V, EKG > Atrial fibrillation with rapid ventricular response Low voltage QRS, Nonspecific ST and T wave abnormality > on call paged. Lab back ok, on call advised to notified in house to assess the patient & check Trop I, in house Dr updated > ordered Amiodarone 150 mg IV bolus and start Amiodarone drip according to protocol,       0620  Patient still A Fib RVR > -150, BP stable, complained from hip pain sore 8/10, in house updated, ordered received to give morphine 1 mg IV once,     0700  Patient sleeping, still A Fib RVR, BP stable, No SBT for now,   Bedside and Verbal shift change report given to Darlyn Osborne RN (oncoming nurse) by Shanelle Huff RN (offgoing nurse). Report included the following information SBAR, Kardex, Intake/Output, MAR, Accordion, Recent Results, Med Rec Status and Cardiac Rhythm A Fib RVR.

## 2020-07-30 NOTE — PROGRESS NOTES
Comprehensive Nutrition Assessment    Type and Reason for Visit: Reassess    Nutrition Recommendations/Plan:   Advance to Diabetic diet as medically able  If unable to advance to PO diet in 24h, recommend placing NGT and initiation of TF:   Would start TwoCal HN @ 15mL/h, advance rate 10mL q 8h as tolerated to Goal Rate of 35mL/h + Prosource BID + 50mL H2O flush q 4h (provides 1800kcals/100gPro/184gCHO/888mL)     Nutrition Assessment:   Chart reviewed, case discussed during CCU rounds. Pt extubated earlier today, needs re-estimated. NPO day 7 today. Now on BiPAP. Will need a swallow eval prior to diet advancement. If unable to advance to PO diet, would recommend NGT placement and starting TF. Provided TF recommendations above. Still no BM, but pt has been NPO since admission. Will monitor need for bowel regimen. Malnutrition Assessment:  Malnutrition Status:    UTD, pt intubated at time of visit. Estimated Daily Nutrient Needs:  Energy (kcal):  1780 (MSJ 1900 x 1.2 -500)  Protein (g):   (1.5-2gPro/kg IBW)       Fluid (ml/day):  1500    Nutrition Related Findings:  Meds: pepcid, humalog, KCl; Drips: precedex. Edema: +2-generalized. No BM yet      Wounds:    None       Current Nutrition Therapies:   DIET NPO Except Meds    Anthropometric Measures:  · Height:  5' 3\" (160 cm)  · Current Body Wt:  141.8 kg (312 lb 9.8 oz)   · Ideal Body Wt:  115:  260.9 %   · BMI Categories:  Obese class 3 (BMI 40.0 or greater)       Nutrition Diagnosis:   · Inadequate protein-energy intake related to other (specify)(intubation) as evidenced by NPO or clear liquid status due to medical condition      Nutrition Interventions:   Food and/or Nutrient Delivery: Start oral diet, Start tube feeding  Nutrition Education and Counseling: No recommendations at this time  Coordination of Nutrition Care: No recommendation at this time    Goals:  Pt will advance to PO diet vs start on TF in 1-4 days.        Nutrition Monitoring and Evaluation:   Physical Signs/Symptoms Outcomes: Biochemical data, Fluid status or edema, Weight, Hemodynamic status, GI status    Discharge Planning:     Too soon to determine     Electronically signed by Anna Mireles RD, [address]  on 7/30/2020 at 1:56 PM    Contact: [phone]

## 2020-07-30 NOTE — PROGRESS NOTES
0700: Bedside and Verbal shift change report given to Sophia (oncoming nurse) by Jessenia Campo (offgoing nurse). Report included the following information SBAR, Kardex, Intake/Output, MAR, Recent Results, Cardiac Rhythm A fib, Alarm Parameters  and Quality Measures. 0820;MD Melinda Rosen to bedside. While MD at bedside, pt converted to NSR.    1000: Interdisciplinary rounds completed, see orders    [phone]: Pt extubated to 2L NC. Pt tolerated extubation well, sat 95% on 2L. Plan to draw ABG in 1hr.    1200: pt rreassessed, see flowsheets    1230: Pt transitioned to BiPap for a nap    1430: PT/OT at bedside, pt on NC to work with them    1600: Pt reassessed, see flowsheets    1700: Pt bathed with this RN and tech    1900: Bedside and Verbal shift change report given to Kalyn Fay (oncoming nurse) by Sophia (offgoing nurse). Report included the following information SBAR, Kardex, Procedure Summary, Intake/Output, Recent Results, Cardiac Rhythm ST, Alarm Parameters  and Quality Measures.

## 2020-07-30 NOTE — PROGRESS NOTES
Problem: Self Care Deficits Care Plan (Adult)  Goal: *Acute Goals and Plan of Care (Insert Text)  Description:   FUNCTIONAL STATUS PRIOR TO ADMISSION: Mod I to Setup A with performing basic ADLs using SPC and leaning on furniture PRN. Sister sets up bucket for Pt to sponge bathe while seated. Mod I with all aspects of toileting. Mostly walks barefoot in her home and doesn't wear socks. Says she can don/doff underpants w/o assist. Does not drive. Admits to x1 fall in past year. HOME SUPPORT: Lives with daughter and sister who provide Setup A with some ADLs and manage all IADLs. Occupational Therapy Goals  Initiated 7/27/2020  1. Patient will perform sponge bathing seated with Setup Assist within 7 day(s). 2.  Patient will perform upper body dressing with independence within 7 day(s). 3.  Patient will perform lower body dressing with Min A within 7 day(s). 4.  Patient will perform toilet transfers with CGA using least restrictive device within 7 day(s). 5.  Patient will perform all aspects of toileting with CGA within 7 day(s). Outcome: Progressing Towards Goal   OCCUPATIONAL THERAPY TREATMENT  Patient: Vic Mckeon (60 y.o. female)  Date: 7/30/2020  Diagnosis: COVID-19 ruled out [Z03.818]   <principal problem not specified>       Precautions:    Chart, occupational therapy assessment, plan of care, and goals were reviewed. ASSESSMENT  Patient continues with skilled OT services and is slowly progressing towards goals. Pt sat EOB for approximately 10 minutes while washing her face and during x3 attempts to stand at [facility]. O2 saturation level remained stable in 90s on 6 L/min via NC. Pt did not exhibit SOB throughout task performance or attempts to stand. Instructed Pt to perform BUE/BLE AROM exercises in bed and to assist with rolling side<>side to maximize strength and endurance in between therapy sessions given prolonged bedrest, with Pt verbalizing understanding.  Continue to recommend SNF rehab. Current Level of Function Impacting Discharge (ADLs): Setup to Total A with ADLs; Max Assist x2 with attempts to stand at [facility]    Other factors to consider for discharge: Respiratory status; Extubated this date         PLAN :  Patient continues to benefit from skilled intervention to address the above impairments. Continue treatment per established plan of care. to address goals. Recommend with staff: Regular position changes to minimize risk for skin breakdown; Bed in chair position if able for all meals     Recommend next OT session: Transfers; Seated grooming/UB bathing or dressing task    Recommendation for discharge: (in order for the patient to meet his/her long term goals)  Therapy up to 5 days/week in SNF setting    This discharge recommendation:  Has been made in collaboration with the attending provider and/or case management    IF patient discharges home will need the following DME: AE: long handled bathing and AE: long handled dressing       SUBJECTIVE:   Patient stated I'm just nosy! \"    OBJECTIVE DATA SUMMARY:   Cognitive/Behavioral Status:  Neurologic State: Alert  Orientation Level: Oriented to person;Oriented to place;Oriented to time  Cognition: Follows commands;Decreased attention/concentration; Impulsive             Functional Mobility and Transfers for ADLs:  Bed Mobility:  Rolling: Minimum assistance  Supine to Sit: Moderate assistance;Assist x2  Sit to Supine: Maximum assistance; Total assistance;Assist x2    Transfers:  Sit to Stand: Maximum assistance;Assist x2          Balance:  Sitting: Intact    ADL Intervention:     Grooming  Washing Face: Set-up(sitting EOB)    Lower Body Dressing Assistance  Socks: Total assistance (dependent)  Position Performed: Supine    Pain:  None reported    Activity Tolerance:   Fair  Please refer to the flowsheet for vital signs taken during this treatment.     After treatment patient left in no apparent distress:   Supine in bed and Call bell within reach    COMMUNICATION/COLLABORATION:   The patients plan of care was discussed with: Physical therapist, Registered nurse, and Patient .      Erika Rodarte, OTR/L  Time Calculation: 31 mins

## 2020-07-30 NOTE — PROGRESS NOTES
Problem: Mobility Impaired (Adult and Pediatric)  Goal: *Acute Goals and Plan of Care (Insert Text)  Description: FUNCTIONAL STATUS PRIOR TO ADMISSION: Patient was modified independent using a single point cane for functional mobility. Patient reports she sponge bathes. Denies any falls. HOME SUPPORT PRIOR TO ADMISSION: The patient lived with her daughter and family but did not require assist.     Physical Therapy Goals  Initiated 7/27/2020  1. Patient will move from supine to sit and sit to supine , scoot up and down, and roll side to side in bed with modified independence within 7 day(s). 2.  Patient will transfer from bed to chair and chair to bed with minimal assistance/contact guard assist using the least restrictive device within 7 day(s). 3.  Patient will perform sit to stand with minimal assistance/contact guard assist within 7 day(s). 4.  Patient will ambulate with moderate assistance  for 10 feet with the least restrictive device within 7 day(s). Outcome: Progressing Towards Goal   PHYSICAL THERAPY TREATMENT  Patient: Sumanth Jaime (22 y.o. female)  Date: 7/30/2020  Diagnosis: COVID-19 ruled out [Z03.818]   <principal problem not specified>       Precautions:    Chart, physical therapy assessment, plan of care and goals were reviewed. ASSESSMENT  Patient continues with skilled PT services and is progressing towards goals. Pt was received in supine on 6L and cleared by nursing to mobilize. She was able to come to the EOB and tolerated sitting for ~ 15 minutes. Attempted to stand 3x with RW, unable to clear bottom. She was returned to supine at the end of the session. Scooted up in the bed. VSS during session. HR increased to 120s. Current Level of Function Impacting Discharge (mobility/balance): max A x 2    Other factors to consider for discharge:          PLAN :  Patient continues to benefit from skilled intervention to address the above impairments.   Continue treatment per established plan of care. to address goals. Recommendation for discharge: (in order for the patient to meet his/her long term goals)  Therapy up to 5 days/week in SNF setting    This discharge recommendation:  Has not yet been discussed the attending provider and/or case management    IF patient discharges home will need the following DME: to be determined (TBD)       SUBJECTIVE:   Patient stated why can't I stand.     OBJECTIVE DATA SUMMARY:   Critical Behavior:  Neurologic State: Alert  Orientation Level: Unable to verbalize  Cognition: Appropriate for age attention/concentration, Follows commands     Functional Mobility Training:  Bed Mobility:  Rolling: Minimum assistance  Supine to Sit: Moderate assistance;Assist x2  Sit to Supine: Maximum assistance; Total assistance;Assist x2           Transfers:  Sit to Stand: Maximum assistance;Assist x2  Stand to Sit: Maximum assistance;Assist x2                             Balance:  Sitting: Intact    Activity Tolerance:   Fair  Please refer to the flowsheet for vital signs taken during this treatment. After treatment patient left in no apparent distress:   Supine in bed and Call bell within reach    COMMUNICATION/COLLABORATION:   The patients plan of care was discussed with: Occupational therapist and Registered nurse.      Fazal Llanos PT, DPT   Time Calculation: 31 mins

## 2020-07-30 NOTE — PROGRESS NOTES
Pharmacy Automatic Renal Dosing Protocol - Antimicrobials    Indication for Antimicrobials: sepsis, CAP    Current Regimen of Each Antimicrobial:  Cefazolin 2gm IV q8h; started ; day 1 (day 4 of therapy)    Previous Antimicrobial Therapy:  Cefepime 2g IV q12h (Start Date ; Day # 3)  Vancomycin 1750 mg q18h; started ; day 2    Significant Cultures:   COVID negative x 2   resp panel - negative (final)   resp panel - MSSA, final   paired blood cx: NGTD, pending     Radiology / Imaging results: (X-ray, CT scan or MRI):    chest XR  IMPRESSION: Mild pulmonary edema which is improved. Small bilateral pleural  effusions slightly increased in the right with some improvement on the left. Paralysis, amputations, malnutrition: none noted    Labs:  Recent Labs     20  0014 20  0358 20  0317   CREA 0.79 0.80 0.93   BUN 20 22* 22*   WBC 10.3 8.7 9.5     Temp (24hrs), Av.5 °F (37.5 °C), Min:98.5 °F (36.9 °C), Max:100.8 °F (38.2 °C)    Creatinine Clearance (mL/min) or Dialysis: 62.6 mL/min (IBW)     Impression/Plan:   Scr stable  Continue current dose of cefazolin for MSSA   Antimicrobial stop date      Pharmacy will follow daily and adjust medications as appropriate for renal function and/or serum levels.     Thank you,  HIRAL Massey

## 2020-07-30 NOTE — PROGRESS NOTES
PULMONARY/Critical Care/SLEEP MEDICINE  [facility]  Name: Doyle Forte   : 1960   MRN: [id number]   Date: 2020 9:33 AM       []      The patient is critically ill on      [x]      Mechanical ventilation []      Pressors   []      BiPAP []         Problems    1. Acute respiratory failure with hypercarbia, hypoxia  2. Afib with RVR now on IV amiodarone  3. Staph bronchitis vs early bronchopneumonia  4. CHF ? Systolic/diastolic? Good diuresis  5. Hypotension ? Sedation related  6. Ventilator dependence  7. obesity  8. High suspicion JOSLYN  9. Hypercapnea  10. H/o tobacco   11. Asthma hx         Plan: Will consider extubation to BIPAP   IV amiodarone  Prn Diuretics as tolerated  bronchial hygine  Culture  Sputum  Empiric abx  DVT ppx  ICU protocols  I/O  Outpatient PFT  Outpatient sleep study     AFRVR this AM. On vent. No chest pain. No fever. Good UOP after diuresis     tires easily and becomes sleepy. Failed SBt due to same behavior. High RSBI. Never had sleep study      failed SBT due to apnea. culture probable staph       thick secretions. Alert on vent. Alkalotic. Failed SBT this weeekkend           Alert .  Rapid shallow breathing on PSV              Vital Signs:    Visit Vitals  /56 (BP 1 Location: Left arm, BP Patient Position: At rest)   Pulse 88   Temp 98.8 °F (37.1 °C)   Resp 19   Ht 5' 3\" (1.6 m)   Wt 141.8 kg (312 lb 9.8 oz)   SpO2 100%   BMI 55.38 kg/m²       O2 Device: Ventilator   O2 Flow Rate (L/min): 15 l/min   Temp (24hrs), Av.6 °F (37.6 °C), Min:98.8 °F (37.1 °C), Max:100.8 °F (38.2 °C)       Intake/Output:   Last shift:      701 - 1900  In: 75 [I.V.:25]  Out: 145 [Urine:70]  Last 3 shifts: 1901 -  0700  In: 3425.7 [I.V.:3035.7]  Out: 7080 [Urine:6380]    Intake/Output Summary (Last 24 hours) at 2020 0903  Last data filed at 2020 0800  Gross per 24 hour   Intake 2005.83 ml   Output 6090 ml   Net -4084.17 ml     Hemodynamics:   PAP:     Wedge:     CVP:  CVP (mmHg): 12 mmHg (07/30/20 0823)   CO:     CI:     SVR:     PVR:        Ventilator Settings:  Ventilator Mode: SIMV  Respiratory Rate  Resp Rate Observed: 29  Back-Up Rate: 6  Insp Flow (l/min): 60 l/min  I:E Ratio: 1:5.5  Ventilator Volumes  Vt Set (ml): 480 ml  Vt Exhaled (Machine Breath) (ml): 367 ml  Vt Spont (ml): 321 ml  Ve Observed (l/min): 7.33 l/min  Ventilator Pressures  PC Set: 6  Pressure Support (cm H2O): 10 cm H2O  PIP Observed (cm H2O): 20 cm H2O  Plateau Pressure (cm H2O): 26 cm H2O  MAP (cm H2O): 11  PEEP/VENT (cm H2O): 6 cm H20  Auto PEEP Observed (cm H2O): 0 cm H2O    Physical Exam:    General: [x]      Intubated/sedated []      No acute distress []          []      Ill appearing []       []            HEENT: [x]     ETT []      PERRL []     NGT     []      Anicteric Mucosa:[]      moist   []      dry []            Resp: [x]      Wheeze []      Clear to ascultation bilaterally []      Accessory muscle use    []      Rales []      Chest tube:  []      Air leak []          []      Ronchi []      Crepitus []         []      Coarse bilateral ventilator breath sounds      CV: [x]      Regular []      Tachycardia []          []      Irregular []      Bradycardic []          []      Murmur []      S1 []          []     RUB []    S2 []            GI: [x]      Soft  []      NG Tube Bowel sounds: []      present []      absent    []      Firm []      PEG []    Tender      []      Distended  []            : []      Petersen []      Hematuria []          []      Clear urine []       []            MSK:    []      SCDs []      Edema []   clubbing       []      No deformities []     Cyanosis  []            Skin: []      Warm [x]      Dry  []   Mottled       []      Cool []      Moist []          []     Lesions []      Diaphoretic []          []      Rash  []      Cyanosis []            N-psych: []      Sedated  []      Agitated []     Moves all extremities     [x]      Alert  Follows commands:   [x]      Yes  []      No []     oriented       Devices: []      PA Catheter []      Tracheostomy []          []      Central Line []        []        Chest tube:  Air leak? []        Y/[]        N    []          []      PICC []       []            DATA:   Current Facility-Administered Medications   Medication Dose Route Frequency    amiodarone (CORDARONE) 900 mg in dextrose 5% 250 ml infusion  0.5 mg/min IntraVENous TITRATE    dextrose 5 % - 0.45% NaCl infusion  25 mL/hr IntraVENous CONTINUOUS    clotrimazole (LOTRIMIN) 1 % cream   Topical BID    miconazole (SECURA) 2 % extra thick cream   Topical BID    phenol throat spray (CHLORASEPTIC) 1 Spray  1 Spray Oral PRN    chlorhexidine (ORAL CARE KIT) 0.12 % mouthwash 15 mL  15 mL Oral Q12H    famotidine (PF) (PEPCID) 20 mg in 0.9% sodium chloride 10 mL injection  20 mg IntraVENous Q12H    cefepime (MAXIPIME) 2 g in 0.9% sodium chloride (MBP/ADV) 100 mL  2 g IntraVENous Q12H    balsam peru-castor oiL (VENELEX) ointment   Topical Q12H    alcohol 62% (NOZIN) nasal  1 Ampule  1 Ampule Topical Q12H    insulin lispro (HUMALOG) injection   SubCUTAneous Q6H    glucose chewable tablet 16 g  4 Tab Oral PRN    dextrose (D50W) injection syrg 12.5-25 g  12.5-25 g IntraVENous PRN    glucagon (GLUCAGEN) injection 1 mg  1 mg IntraMUSCular PRN    sodium chloride (NS) flush 5-40 mL  5-40 mL IntraVENous Q8H    sodium chloride (NS) flush 5-40 mL  5-40 mL IntraVENous PRN    acetaminophen (TYLENOL) tablet 650 mg  650 mg Oral Q6H PRN    Or    acetaminophen (TYLENOL) suppository 650 mg  650 mg Rectal Q6H PRN    polyethylene glycol (MIRALAX) packet 17 g  17 g Oral DAILY PRN    [Held by provider] enoxaparin (LOVENOX) injection 40 mg  40 mg SubCUTAneous DAILY    ondansetron (ZOFRAN) injection 4 mg  4 mg IntraVENous Q6H PRN    aspirin chewable tablet 81 mg  81 mg Oral DAILY    [Held by provider] metoprolol tartrate (LOPRESSOR) tablet 25 mg  25 mg Oral BID    [Held by provider] atorvastatin (LIPITOR) tablet 10 mg  10 mg Oral DAILY    hydrALAZINE (APRESOLINE) 20 mg/mL injection 20 mg  20 mg IntraVENous Q6H PRN       Telemetry: []        Sinus []        A-flutter []        Paced    []        A-fib []        Multiple PVCs                  Labs:  Recent Labs     07/30/20  0014 07/29/20 0358 07/28/20 0317   WBC 10.3 8.7 9.5   HGB 13.6 12.8 13.3   HCT 43.8 42.4 43.1    128* 130*     Recent Labs     07/30/20  0014 07/29/20 0358 07/28/20 0317   * 133* 134*   K 3.7 3.9 3.3*   CL 95* 98 95*   CO2 32 32 33*   * 121* 117*   BUN 20 22* 22*   CREA 0.79 0.80 0.93   CA 8.8 8.5 8.7   MG 2.0  --  2.1   PHOS  --   --  3.2   ALB 2.3* 2.0* 2.1*   TBILI 1.7* 1.7* 2.4*   ALT 22 18 17     No results for input(s): PH, PCO2, PO2, HCO3, FIO2 in the last 72 hours.     Imaging:  [x]      I have personally reviewed the patients radiographs     []      No change from prior, tubes and lines in adequate position  []      Improved   []      Worsening     IMPRESSION:     The patient is: []       acutely ill Risk of deterioration: []       moderate    []       critically ill  []       high     Active Problems:    COVID-19 ruled out (7/24/2020)          ·   []      Total critical care time exclusive of procedures   35    minutes  Shi Sena MD

## 2020-07-30 NOTE — PROGRESS NOTES
07/30/20 0429   ABCDEF Bundle   SBT Safety Screen Passed Yes   SBT Trial Passed No   SBT Trial Reason for Failure   (per rn )

## 2020-07-30 NOTE — PROGRESS NOTES
[phone]  CCU on call paged for below Vital signs & ABGs result, order received for rest her overnight back to PS of 10, IP 6, Rate 6 > to give Melatonin 6 mg PO now, if she did not sleep to order Benadryl 25 mg IV once, lab for tomorrow CXR, CBC, BMP, Mg, SBT at am ABGs then if ok extubate and BiPAP PRN, for VS Temp down to 99.8 & HR 90s after tylenol 2 tab PRN, Tidal volume 175 for 1-2 breathes then avarge of 225-350 ml, RR 17-26. Visit Vitals  /47   Pulse 100   Temp (!) 100.8 °F (38.2 °C)   Resp 18   Ht 5' 3\" (1.6 m)   Wt 150.1 kg (330 lb 14.6 oz)   SpO2 97%   BMI 58.62 kg/m²     Results for Shameka Farfan (MRN [id number]) as of 7/29/2020 20:24   Ref. Range 7/29/2020 20:12   pH (POC) Latest Ref Range: 7.35 - 7.45   7.34 (L)   pCO2 (POC) Latest Ref Range: 35.0 - 45.0 MMHG 65.9 (H)   pO2 (POC) Latest Ref Range: 80 - 100 MMHG 77 (L)   HCO3 (POC) Latest Ref Range: 22 - 26 MMOL/L 35.4 (H)   sO2 (POC) Latest Ref Range: 92 - 97 % 94   Base excess (POC) Latest Units: mmol/L 10   FIO2 (POC) Latest Units: % 50   Specimen type (POC) Latest Units:   ARTERIAL   Site Latest Units:   LEFT RADIAL   Device: Latest Units:   VENT   Total resp.  rate Latest Units:   22   Mode Latest Units:   CPAP/SPON   PEEP/CPAP (POC) Latest Units: cmH2O 6   Pressure support Latest Units: cmH2O 6   Allens test (POC) Latest Units:   YES

## 2020-07-30 NOTE — PROGRESS NOTES
Hospitalist Progress Note    NAME: Debbi Whitehead   :  1960   MRN:  [id number]     Admit date: 2020    Today's date: 20    PCP: MOISE Mckinnon M.D. Cell [phone]    Assessment / Plan:  Afib with RVR   Started on amio drip   Resume BB once cleared to take PO   Acute respiratory failure with hypoxia and hypercarbia POA s/p intubation   Probable acute on chronic diastolic CHF POA LVEF 29-89%  ? Acute bronchitis POA   Underlying JOSLYN or OHS POA with Body mass index is 58.58 kg/m². Metabolic encephalopathy POA due to hypercarbia   COVID-19 rule out POA testing negative x 2  Baseline does not wear home o2, admits to chronic CHOPRA  Past few days worsening CHOPRA and SOB at rest  Positive LE edema and orthopnea  No fevers, positive dry cough  HCO3 elevated at admit 36, suspect some chronic CO2 retention at baseline  CXR with bilateral interstitial edema   pBNP 2,987  procalcitonin < 0.05  Differential; Acute left CHF, atypical pneumonia    extubated today   No clear pneumonia, pulm added antibiotics, sputum culture showed MSSA   Will DC vanco and s/p cefipime , now on cefazolin     Thrombocytopenia resolved   PLTs dropped from 210,000 to 120,000, improved at 121513   unlikely POP  platelets 081  Normal coags, fibrinogen     Essential HTN POA  Hyperlipidemia POA  Resume  metoprolol and lipitor once cleared to take PO  PRN labetalol  LDL 55,      DM type 2 POA  Not currently on treatment  POC, SSI  HgBa1c 6.0%     Remote tobacco use quit 40 years ago     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      DVT prophylaxis with lovenox     Code status: full code  NOK: daughter     Subjective:     Chief Complaint / Reason for Physician Visit f/u acute respiratory failure  FU CHF/pneumonia .  Extubated today ,saturating well on NC   Review of Systems:  Symptom Y/N Comments  Symptom Y/N Comments   Fever/Chills n   Chest Pain n    Poor Appetite    Edema     Cough n   Abdominal Pain n    Sputum n   Joint Pain     SOB/CHOPRA y   Headache     Nausea/vomit n   Tolerating PT/OT     Diarrhea    Tolerating Diet y    Constipation    Other       Could NOT obtain due to:      Objective:     VITALS:   Last 24hrs VS reviewed since prior progress note.  Most recent are:  Patient Vitals for the past 24 hrs:   Temp Pulse Resp BP SpO2   07/30/20 0823  88 19  100 %   07/30/20 0809  (!) 120 19  98 %   07/30/20 0800 98.8 °F (37.1 °C) (!) 143 20 120/56 99 %   07/30/20 0700  (!) 140 23 108/51 99 %   07/30/20 0600  (!) 145 21 124/56 96 %   07/30/20 0500  (!) 138 17 119/63 97 %   07/30/20 0429  (!) 132 21  97 %   07/30/20 0400 99 °F (37.2 °C) (!) 138 21 113/71 96 %   07/30/20 0300  (!) 136 21 115/56 96 %   07/30/20 0200  (!) 130 21 101/47 96 %   07/30/20 0100 99.8 °F (37.7 °C) (!) 147 24 111/53 97 %   07/30/20 0007  (!) 145 23 100/51 95 %   07/30/20 0000  (!) 144 26 (!) 88/59 95 %   07/29/20 2344  (!) 144 27 (!) 126/33 94 %   07/29/20 2330 100.4 °F (38 °C) 85 22 112/48 96 %   07/29/20 2317  86 19  94 %   07/29/20 2300  74 17 107/49 95 %   07/29/20 2200  75 12 105/50 95 %   07/29/20 2100  98 17 117/48 98 %   07/29/20 2030 99.8 °F (37.7 °C) 100 18  97 %   07/29/20 2000  (!) 102 19 109/47 96 %   07/29/20 1930 (!) 100.8 °F (38.2 °C) 99 21 105/54 95 %   07/29/20 1920  (!) 101 17  96 %   07/29/20 1900  100 23 118/46 95 %   07/29/20 1800  95 19 131/90 97 %   07/29/20 1700  93 20 126/52 95 %   07/29/20 1600 98.8 °F (37.1 °C) 87 22 127/64 94 %   07/29/20 1554  87 28  94 %   07/29/20 1400  84 22 130/63 94 %   07/29/20 1300  77 20 104/56 97 %   07/29/20 1200 99.1 °F (37.3 °C) 77 29 103/56    07/29/20 1122  69 19  99 %   07/29/20 1118  70      07/29/20 1100  68 23 106/52 97 %   07/29/20 1000  64 20 108/55 97 % 07/29/20 0900  65 19 100/48 97 %       Intake/Output Summary (Last 24 hours) at 7/30/2020 0836  Last data filed at 7/30/2020 0800  Gross per 24 hour   Intake 2005.83 ml   Output 6090 ml   Net -4084.17 ml        Wt Readings from Last 12 Encounters:   07/29/20 141.8 kg (312 lb 9.8 oz)   12/27/19 136.1 kg (300 lb)   01/30/19 136.1 kg (300 lb)   11/13/15 134.7 kg (297 lb)   03/17/15 140.1 kg (308 lb 13.8 oz)       PHYSICAL EXAM:  General: WD, WN.extubated , alert  EENT:  PERRL. Anicteric sclerae. ETT in place  Resp:  Crackles at bases bilaterally, no wheezing. No accessory muscle use  CV:  Regular  rhythm,  1+ edema  GI:  Soft, Non distended, Non tender. +Bowel sounds, no rebound  LN:  No cervical or inguinal MONICA  Neurologic:  AAOx4 , no focal deficit   Psych:    Not anxious nor agitated  Skin:  No rashes. No jaundice    Reviewed most current lab test results and cultures  YES  Reviewed most current radiology test results   YES  Review and summation of old records today    NO  Reviewed patient's current orders and MAR    YES  PMH/SH reviewed - no change compared to H&P  ________________________________________________________________________  Care Plan discussed with:    Comments   Patient x    Family      RN x    Care Manager     Consultant                        Multidiciplinary team rounds were held today with , nursing, pharmacist and clinical coordinator. Patient's plan of care was discussed; medications were reviewed and discharge planning was addressed. ________________________________________________________________________      Comments   >50% of visit spent in counseling and coordination of care x    ________________________________________________________________________  Gray Muniz MD     Procedures: see electronic medical records for all procedures/Xrays and details which were not copied into this note but were reviewed prior to creation of Plan.       LABS:  I reviewed today's most current labs and imaging studies.   Pertinent labs include:  Recent Labs     07/30/20  0014 07/29/20 0358 07/28/20 0317   WBC 10.3 8.7 9.5   HGB 13.6 12.8 13.3   HCT 43.8 42.4 43.1    128* 130*     Recent Labs     07/30/20 0014 07/29/20 0358 07/28/20 0317   * 133* 134*   K 3.7 3.9 3.3*   CL 95* 98 95*   CO2 32 32 33*   * 121* 117*   BUN 20 22* 22*   CREA 0.79 0.80 0.93   CA 8.8 8.5 8.7   MG 2.0  --  2.1   PHOS  --   --  3.2   ALB 2.3* 2.0* 2.1*   TBILI 1.7* 1.7* 2.4*   ALT 22 18 17

## 2020-07-30 NOTE — INTERDISCIPLINARY ROUNDS
Critical care interdisciplinary rounds held on 07/30/2020. Following members present, Pharmacy, Diabetes Treatment, Case Management, Respiratory Therapy, Physical Therapy and Nutrition. Led by Trisha Alvarez. Vanessa Rodriguez RN and Dr. Holley Watkins. Plan of care discussed. See clinical pathway for plan of care and interventions and desired outcomes.

## 2020-07-31 LAB
ALBUMIN SERPL-MCNC: 2.5 G/DL (ref 3.5–5)
ALBUMIN/GLOB SERPL: 0.5 {RATIO} (ref 1.1–2.2)
ALP SERPL-CCNC: 88 U/L (ref 45–117)
ALT SERPL-CCNC: 23 U/L (ref 12–78)
ANION GAP SERPL CALC-SCNC: 3 MMOL/L (ref 5–15)
ARTERIAL PATENCY WRIST A: YES
AST SERPL-CCNC: 26 U/L (ref 15–37)
BASE EXCESS BLD CALC-SCNC: 9 MMOL/L
BDY SITE: ABNORMAL
BILIRUB SERPL-MCNC: 0.9 MG/DL (ref 0.2–1)
BUN SERPL-MCNC: 26 MG/DL (ref 6–20)
BUN/CREAT SERPL: 28 (ref 12–20)
CA-I BLD-SCNC: 1.3 MMOL/L (ref 1.12–1.32)
CALCIUM SERPL-MCNC: 9.5 MG/DL (ref 8.5–10.1)
CHLORIDE SERPL-SCNC: 95 MMOL/L (ref 97–108)
CO2 SERPL-SCNC: 36 MMOL/L (ref 21–32)
CREAT SERPL-MCNC: 0.93 MG/DL (ref 0.55–1.02)
D DIMER PPP FEU-MCNC: 1.25 MG/L FEU (ref 0–0.65)
FIBRINOGEN PPP-MCNC: 566 MG/DL (ref 200–475)
GAS FLOW.O2 O2 DELIVERY SYS: ABNORMAL L/MIN
GAS FLOW.O2 SETTING OXYMISER: 4 L/M
GLOBULIN SER CALC-MCNC: 5.5 G/DL (ref 2–4)
GLUCOSE BLD STRIP.AUTO-MCNC: 117 MG/DL (ref 65–100)
GLUCOSE BLD STRIP.AUTO-MCNC: 143 MG/DL (ref 65–100)
GLUCOSE BLD STRIP.AUTO-MCNC: 153 MG/DL (ref 65–100)
GLUCOSE SERPL-MCNC: 105 MG/DL (ref 65–100)
HCO3 BLD-SCNC: 36.7 MMOL/L (ref 22–26)
MAGNESIUM SERPL-MCNC: 2.4 MG/DL (ref 1.6–2.4)
PCO2 BLD: 85.7 MMHG (ref 35–45)
PF4 HEPARIN CMPLX AB SER-ACNC: 0.14 OD (ref 0–0.4)
PH BLD: 7.24 [PH] (ref 7.35–7.45)
PHOSPHATE SERPL-MCNC: 2.5 MG/DL (ref 2.6–4.7)
PO2 BLD: 65 MMHG (ref 80–100)
POTASSIUM SERPL-SCNC: 4.5 MMOL/L (ref 3.5–5.1)
PROT SERPL-MCNC: 8 G/DL (ref 6.4–8.2)
SAO2 % BLD: 87 % (ref 92–97)
SERVICE CMNT-IMP: ABNORMAL
SODIUM SERPL-SCNC: 134 MMOL/L (ref 136–145)
SPECIMEN TYPE: ABNORMAL
TOTAL RESP. RATE, ITRR: 14

## 2020-07-31 PROCEDURE — 83735 ASSAY OF MAGNESIUM: CPT

## 2020-07-31 PROCEDURE — 82962 GLUCOSE BLOOD TEST: CPT

## 2020-07-31 PROCEDURE — 36415 COLL VENOUS BLD VENIPUNCTURE: CPT

## 2020-07-31 PROCEDURE — 82803 BLOOD GASES ANY COMBINATION: CPT

## 2020-07-31 PROCEDURE — 80053 COMPREHEN METABOLIC PANEL: CPT

## 2020-07-31 PROCEDURE — 74011000250 HC RX REV CODE- 250: Performed by: INTERNAL MEDICINE

## 2020-07-31 PROCEDURE — 85384 FIBRINOGEN ACTIVITY: CPT

## 2020-07-31 PROCEDURE — 94660 CPAP INITIATION&MGMT: CPT

## 2020-07-31 PROCEDURE — 74011250637 HC RX REV CODE- 250/637: Performed by: INTERNAL MEDICINE

## 2020-07-31 PROCEDURE — 36600 WITHDRAWAL OF ARTERIAL BLOOD: CPT

## 2020-07-31 PROCEDURE — 65610000006 HC RM INTENSIVE CARE

## 2020-07-31 PROCEDURE — 74011250636 HC RX REV CODE- 250/636: Performed by: INTERNAL MEDICINE

## 2020-07-31 PROCEDURE — 74011000258 HC RX REV CODE- 258: Performed by: INTERNAL MEDICINE

## 2020-07-31 PROCEDURE — 74011636637 HC RX REV CODE- 636/637: Performed by: INTERNAL MEDICINE

## 2020-07-31 PROCEDURE — 85379 FIBRIN DEGRADATION QUANT: CPT

## 2020-07-31 PROCEDURE — 84100 ASSAY OF PHOSPHORUS: CPT

## 2020-07-31 RX ORDER — METOPROLOL TARTRATE 50 MG/1
50 TABLET ORAL 2 TIMES DAILY
Status: DISCONTINUED | OUTPATIENT
Start: 2020-07-31 | End: 2020-08-01

## 2020-07-31 RX ADMIN — WATER 2 G: 1 INJECTION INTRAMUSCULAR; INTRAVENOUS; SUBCUTANEOUS at 06:03

## 2020-07-31 RX ADMIN — SODIUM CHLORIDE 250 ML: 900 INJECTION, SOLUTION INTRAVENOUS at 22:28

## 2020-07-31 RX ADMIN — CASTOR OIL AND BALSAM, PERU: 788; 87 OINTMENT TOPICAL at 06:03

## 2020-07-31 RX ADMIN — CASTOR OIL AND BALSAM, PERU: 788; 87 OINTMENT TOPICAL at 17:27

## 2020-07-31 RX ADMIN — DEXTROSE MONOHYDRATE AND SODIUM CHLORIDE 25 ML/HR: 5; .45 INJECTION, SOLUTION INTRAVENOUS at 06:03

## 2020-07-31 RX ADMIN — Medication 10 ML: at 06:03

## 2020-07-31 RX ADMIN — CLOTRIMAZOLE: 10 CREAM TOPICAL at 17:27

## 2020-07-31 RX ADMIN — Medication 1 AMPULE: at 08:28

## 2020-07-31 RX ADMIN — CHLORHEXIDINE GLUCONATE 15 ML: 0.12 RINSE ORAL at 20:07

## 2020-07-31 RX ADMIN — MICONAZOLE NITRATE: 20 CREAM TOPICAL at 08:33

## 2020-07-31 RX ADMIN — Medication 10 ML: at 21:26

## 2020-07-31 RX ADMIN — WATER 2 G: 1 INJECTION INTRAMUSCULAR; INTRAVENOUS; SUBCUTANEOUS at 13:47

## 2020-07-31 RX ADMIN — METOPROLOL TARTRATE 50 MG: 50 TABLET, FILM COATED ORAL at 17:28

## 2020-07-31 RX ADMIN — CLOTRIMAZOLE: 10 CREAM TOPICAL at 08:33

## 2020-07-31 RX ADMIN — MICONAZOLE NITRATE: 20 CREAM TOPICAL at 17:27

## 2020-07-31 RX ADMIN — CHLORHEXIDINE GLUCONATE 15 ML: 0.12 RINSE ORAL at 08:34

## 2020-07-31 RX ADMIN — Medication 10 ML: at 13:47

## 2020-07-31 RX ADMIN — FAMOTIDINE 20 MG: 10 INJECTION, SOLUTION INTRAVENOUS at 08:30

## 2020-07-31 RX ADMIN — INSULIN LISPRO 2 UNITS: 100 INJECTION, SOLUTION INTRAVENOUS; SUBCUTANEOUS at 17:25

## 2020-07-31 RX ADMIN — ASPIRIN 81 MG CHEWABLE TABLET 81 MG: 81 TABLET CHEWABLE at 08:33

## 2020-07-31 RX ADMIN — WATER 2 G: 1 INJECTION INTRAMUSCULAR; INTRAVENOUS; SUBCUTANEOUS at 21:20

## 2020-07-31 RX ADMIN — METOPROLOL TARTRATE 50 MG: 50 TABLET, FILM COATED ORAL at 11:17

## 2020-07-31 RX ADMIN — Medication 1 AMPULE: at 20:07

## 2020-07-31 NOTE — PROGRESS NOTES
PULMONARY/Critical Care/SLEEP MEDICINE  [facility]  Name: Lorene Morris   : 1960   MRN: [id number]   Date: 2020 9:33 AM       [x]      The patient is critically ill on      []      Mechanical ventilation []      Pressors   [x]      BiPAP []         Problems    1. Acute respiratory failure with hypercarbia, hypoxia- she is NIV dependent and at high risk of deterioration if she stops using it; extubated  and wore BIPAP all night  2. Afib with RVR now on IV amiodarone  3. Staph bronchitis vs early bronchopneumonia  4. CHF ? Systolic/diastolic? Good diuresis  5. Hypotension ? Sedation related  6. Ventilator dependence  7. obesity  8. High suspicion JOSLYN  9. Hypercapnea  10. H/o tobacco   11. Asthma hx         Plan:    Keep in CCU for nowBIPAP most of day and all night to avoid reintubation  If reintubated will need trach  Will need NIV for home use and inpt Pulmonary rehab  might try her on AVAPS mode while here and follow ABG on the settings needed at home  Keep in CCU  Resume metoprolol from home then d.c IV amiodarone tonight  Prn Diuretics as tolerated  bronchial hygine  Culture  Sputum  Empiric abx  DVT ppx  Pt/ OT  Outpatient PFT  Outpatient sleep study      ·   [x]      Total critical care time exclusive of procedures   35    minutes     BIPAP all night after extubation yesterday; ABg off BIPAP- PCO2 now> 80. Sleepy all day and worse off BIPAP. Sinus tachycardia. Hungry     AFRVR this AM. On vent. No chest pain. No fever. Good UOP after diuresis     tires easily and becomes sleepy. Failed SBt due to same behavior. High RSBI. Never had sleep study      failed SBT due to apnea. culture probable staph       thick secretions. Alert on vent. Alkalotic. Failed SBT this weeekkend           Alert .  Rapid shallow breathing on PSV PP6             Vital Signs:    Visit Vitals  BP (!) 141/28   Pulse (!) 105   Temp 98.4 °F (36.9 °C)   Resp 21   Ht 5' 3\" (1.6 m)   Wt 143.5 kg (316 lb 5.8 oz)   SpO2 95%   BMI 56.04 kg/m²       O2 Device: BIPAP   O2 Flow Rate (L/min): 4 l/min   Temp (24hrs), Av.5 °F (36.9 °C), Min:97.6 °F (36.4 °C), Max:99.1 °F (37.3 °C)       Intake/Output:   Last shift:       07 - 1900  In: 203.3 [P.O.:120;  I.V.:83.3]  Out: 205 [Urine:205]  Last 3 shifts: 1901 -  0700  In: 2674 [P.O.:480; I.V.:1954]  Out: 2320 [Urine:2020]    Intake/Output Summary (Last 24 hours) at 2020 1153  Last data filed at 2020 1100  Gross per 24 hour   Intake 1508.92 ml   Output 950 ml   Net 558.92 ml     Hemodynamics:   PAP:     Wedge:     CVP:  CVP (mmHg): 59 mmHg (20 1000)   CO:     CI:     SVR:     PVR:        Ventilator Settings:  Ventilator Mode: Spontaneous  Respiratory Rate  Resp Rate Observed: 29  Back-Up Rate: 6  Insp Flow (l/min): 60 l/min  I:E Ratio: 1:5.5  Ventilator Volumes  Vt Set (ml): 480 ml  Vt Exhaled (Machine Breath) (ml): 367 ml  Vt Spont (ml): 358 ml  Ve Observed (l/min): 7.5 l/min  Ventilator Pressures  PC Set: 6  Pressure Support (cm H2O): 10 cm H2O  PIP Observed (cm H2O): 20 cm H2O  Plateau Pressure (cm H2O): 26 cm H2O  MAP (cm H2O): 11  PEEP/VENT (cm H2O): 6 cm H20  Auto PEEP Observed (cm H2O): 0 cm H2O    Physical Exam:    General: [x]      Intubated/sedated []      No acute distress []          []      Ill appearing []       []            HEENT: [x]     ETT []      PERRL []     NGT     []      Anicteric Mucosa:[]      moist   []      dry []            Resp: [x]      Wheeze []      Clear to ascultation bilaterally []      Accessory muscle use    []      Rales []      Chest tube:  []      Air leak []          []      Ronchi []      Crepitus []         []      Coarse bilateral ventilator breath sounds      CV: [x]      Regular []      Tachycardia []          []      Irregular []      Bradycardic []          []      Murmur []      S1 []          []     RUB []    S2 []            GI: [x]      Soft  []      NG Tube Bowel sounds: []      present []      absent    []      Firm []      PEG []    Tender      []      Distended  []            : []      Petersen []      Hematuria []          []      Clear urine []       []            MSK:    []      SCDs []      Edema []   clubbing       []      No deformities []     Cyanosis  []            Skin: []      Warm [x]      Dry  []   Mottled       []      Cool []      Moist []          []     Lesions []      Diaphoretic []          []      Rash  []      Cyanosis []            N-psych: []      Sedated  []      Agitated []     Moves all extremities     [x]      Alert  Follows commands:   [x]      Yes  []      No []     oriented       Devices: []      PA Catheter []      Tracheostomy []          []      Central Line []        []        Chest tube:  Air leak? []        Y/[]        N    []          []      PICC []       []            DATA:   Current Facility-Administered Medications   Medication Dose Route Frequency    metoprolol tartrate (LOPRESSOR) tablet 50 mg  50 mg Oral BID    amiodarone (CORDARONE) 900 mg in dextrose 5% 250 ml infusion  0.5 mg/min IntraVENous TITRATE    ceFAZolin (ANCEF) 2 g in sterile water (preservative free) 20 mL IV syringe  2 g IntraVENous Q8H    insulin lispro (HUMALOG) injection   SubCUTAneous AC&HS    dextrose 5 % - 0.45% NaCl infusion  25 mL/hr IntraVENous CONTINUOUS    clotrimazole (LOTRIMIN) 1 % cream   Topical BID    miconazole (SECURA) 2 % extra thick cream   Topical BID    phenol throat spray (CHLORASEPTIC) 1 Spray  1 Spray Oral PRN    chlorhexidine (ORAL CARE KIT) 0.12 % mouthwash 15 mL  15 mL Oral Q12H    balsam peru-castor oiL (VENELEX) ointment   Topical Q12H    alcohol 62% (NOZIN) nasal  1 Ampule  1 Ampule Topical Q12H    glucose chewable tablet 16 g  4 Tab Oral PRN    dextrose (D50W) injection syrg 12.5-25 g  12.5-25 g IntraVENous PRN    glucagon (GLUCAGEN) injection 1 mg  1 mg IntraMUSCular PRN    sodium chloride (NS) flush 5-40 mL  5-40 mL IntraVENous Q8H    sodium chloride (NS) flush 5-40 mL  5-40 mL IntraVENous PRN    acetaminophen (TYLENOL) tablet 650 mg  650 mg Oral Q6H PRN    Or    acetaminophen (TYLENOL) suppository 650 mg  650 mg Rectal Q6H PRN    polyethylene glycol (MIRALAX) packet 17 g  17 g Oral DAILY PRN    [Held by provider] enoxaparin (LOVENOX) injection 40 mg  40 mg SubCUTAneous DAILY    ondansetron (ZOFRAN) injection 4 mg  4 mg IntraVENous Q6H PRN    aspirin chewable tablet 81 mg  81 mg Oral DAILY    [Held by provider] metoprolol tartrate (LOPRESSOR) tablet 25 mg  25 mg Oral BID    [Held by provider] atorvastatin (LIPITOR) tablet 10 mg  10 mg Oral DAILY    hydrALAZINE (APRESOLINE) 20 mg/mL injection 20 mg  20 mg IntraVENous Q6H PRN       Telemetry: []        Sinus []        A-flutter []        Paced    []        A-fib []        Multiple PVCs                  Labs:  Recent Labs     07/30/20  0014 07/29/20  0358   WBC 10.3 8.7   HGB 13.6 12.8   HCT 43.8 42.4    128*     Recent Labs     07/31/20  0543 07/30/20  0014 07/29/20  0358   * 134* 133*   K 4.5 3.7 3.9   CL 95* 95* 98   CO2 36* 32 32   * 107* 121*   BUN 26* 20 22*   CREA 0.93 0.79 0.80   CA 9.5 8.8 8.5   MG 2.4 2.0  --    PHOS 2.5*  --   --    ALB 2.5* 2.3* 2.0*   TBILI 0.9 1.7* 1.7*   ALT 23 22 18     No results for input(s): PH, PCO2, PO2, HCO3, FIO2 in the last 72 hours.     Imaging:  [x]      I have personally reviewed the patients radiographs     []      No change from prior, tubes and lines in adequate position  []      Improved   []      Worsening     IMPRESSION:     The patient is: [x]       acutely ill Risk of deterioration: []       moderate    [x]       critically ill  [x]       high     Active Problems:    COVID-19 ruled out (7/24/2020)          Neela Sol MD

## 2020-07-31 NOTE — PROGRESS NOTES
OT Note:    Chart reviewed. Noted Pt on BIPAP all night following extubation yesterday and retaining CO2 per interdisciplinary rounds. Pt also tachycardic. Attempted to see Pt for gentle, bed-level TherEx and Pt declined feeling up to this today. Will defer and follow back on Monday.      Thank you,    Brodie Burch, OTR/L

## 2020-07-31 NOTE — INTERDISCIPLINARY ROUNDS
Interdisciplinary team rounds were held 7/31/2020 with the following team members:Care Management, Diabetes Treatment Specialist, Nursing, Nutrition, Pharmacy, Physical Therapy, Physician and Clinical Coordinator. Plan of care discussed. See clinical pathway and/or care plan for interventions and desired outcomes.

## 2020-07-31 NOTE — PROGRESS NOTES
0700: Bedside and Verbal shift change report [facility]) by Ghassan Kenny (offgoing nurse). Report included the following information SBAR, Kardex, Intake/Output, MAR, Recent Results, Cardiac Rhythm ST/NSR, Alarm Parameters  and Quality Measures. 0900: ABG drawn, CO elevated, pt to be placed back on Bipap    1000: Pt bathed with RN and PCT    1200: Pt reassessed, see flowsheets    1440: MD Mariscal Agent at bedside    1600: pt reassessed, see flowsheets. 1900: Verbal shift change report given to [facility] (oncoming nurse) by Sophia (offgoing nurse). Report included the following information SBAR, Kardex, Intake/Output, MAR, Recent Results, Cardiac Rhythm NSR, Alarm Parameters  and Quality Measures.

## 2020-07-31 NOTE — PROGRESS NOTES
Hospitalist Progress Note    NAME: Pili Andino   :  1960   MRN:  [id number]     Admit date: 2020    Today's date: 20    PCP: MOISE Mg M.D. Cell [phone]    Assessment / Plan:  Afib with RVR   Started on amio drip   Resume BB once cleared to take PO   Acute respiratory failure with hypoxia and hypercarbia POA s/p intubation   Probable acute on chronic diastolic CHF POA LVEF 09-03%  ? Acute bronchitis POA   Underlying JOSLYN or OHS POA with Body mass index is 58.58 kg/m².   Metabolic encephalopathy POA due to hypercarbia   COVID-19 rule out POA testing negative x 2  Baseline does not wear home o2, admits to chronic CHOPRA  Past few days worsening CHOPRA and SOB at rest  Positive LE edema and orthopnea  No fevers, positive dry cough  HCO3 elevated at admit 36, suspect some chronic CO2 retention at baseline  CXR with bilateral interstitial edema   pBNP 2,987  procalcitonin < 0.05  Differential; Acute left CHF, atypical pneumonia    extubated today   No clear pneumonia, pulm added antibiotics, sputum culture showed MSSA   Will DC vanco and s/p cefipime , now on cefazolin   Still requiring BIPAP    Thrombocytopenia resolved   PLTs dropped from 210,000 to 120,000, improved at [id number]   unlikely POP  platelets 199  Normal coags, fibrinogen     Essential HTN POA  Hyperlipidemia POA  Resume  metoprolol and lipitor once cleared to take PO  PRN labetalol  LDL 55,      DM type 2 POA  Not currently on treatment  POC, SSI  HgBa1c 6.0%     Remote tobacco use quit 40 years ago     Impaired mobility POA due to DJD knees  Not walking much at home, limited by knee pain     Fungal rash in trunk skin folds POA  Topical nystatin    Morbid obesity POA Body mass index is 56.04 kg/m².      DVT prophylaxis with lovenox     Code status: full code  NOK: daughter     Subjective:     Chief Complaint / Reason for Physician Visit f/u acute respiratory failure  FU CHF/pneumonia . on BIPAP   Review of Systems:  Symptom Y/N Comments  Symptom Y/N Comments   Fever/Chills    Chest Pain     Poor Appetite    Edema     Cough    Abdominal Pain     Sputum    Joint Pain     SOB/CHOPRA y   Headache     Nausea/vomit    Tolerating PT/OT     Diarrhea    Tolerating Diet  npo   Constipation    Other       Could NOT obtain due to: BIPAP      Objective:     VITALS:   Last 24hrs VS reviewed since prior progress note.  Most recent are:  Patient Vitals for the past 24 hrs:   Temp Pulse Resp BP SpO2   07/31/20 1300  93 25 132/48 96 %   07/31/20 1205     96 %   07/31/20 1200 99 °F (37.2 °C) 99 20 152/51 96 %   07/31/20 1100  (!) 102 24 98/71 96 %   07/31/20 1000  (!) 105 21 (!) 141/28    07/31/20 0905     95 %   07/31/20 0900  (!) 108 28 151/63 97 %   07/31/20 0800 98.4 °F (36.9 °C) (!) 105 24 132/61 96 %   07/31/20 0700  (!) 106 23 91/72 96 %   07/31/20 0600  (!) 101 21 131/52 100 %   07/31/20 0500  100 26 133/51 95 %   07/31/20 0400 98.3 °F (36.8 °C) 100 21 132/53 99 %   07/31/20 0300  98 21 150/45 97 %   07/31/20 0255     (!) 85 %   07/31/20 0200  99 23 126/53 99 %   07/31/20 0100  100 23 108/49 93 %   07/31/20 0000 97.6 °F (36.4 °C) (!) 102 21 110/62 95 %   07/30/20 2320     95 %   07/30/20 2300  (!) 103 17 (!) 115/92 97 %   07/30/20 2200  (!) 102 24 123/46 95 %   07/30/20 2100  (!) 104 19 134/44 94 %   07/30/20 2000 99.1 °F (37.3 °C) (!) 108 19 149/74 95 %   07/30/20 1943     93 %   07/30/20 1900  (!) 119 25  90 %   07/30/20 1830  (!) 122 21 152/49 90 %   07/30/20 1800  (!) 115 23  93 %   07/30/20 1700  (!) 112 26 134/42 91 %   07/30/20 1600 98.9 °F (37.2 °C) (!) 117 23 132/52 91 %   07/30/20 1500  (!) 114 24 137/59    07/30/20 1400  (!) 115 24 132/59 95 %       Intake/Output Summary (Last 24 hours) at 7/31/2020 1350  Last data filed at 7/31/2020 1300  Gross per 24 hour   Intake 1458.92 ml   Output 955 ml   Net 503.92 ml        Wt Readings from Last 12 Encounters:   07/31/20 143.5 kg (316 lb 5.8 oz)   12/27/19 136.1 kg (300 lb)   01/30/19 136.1 kg (300 lb)   11/13/15 134.7 kg (297 lb)   03/17/15 140.1 kg (308 lb 13.8 oz)       PHYSICAL EXAM:  General: WD, WN.extubated , alert  EENT:  PERRL. Anicteric sclerae. ETT in place  Resp:  Crackles at bases bilaterally, no wheezing. No accessory muscle use  CV:  Regular  rhythm,  1+ edema  GI:  Soft, Non distended, Non tender. +Bowel sounds, no rebound  LN:  No cervical or inguinal MONICA  Neurologic:  AAOx4 , no focal deficit   Psych:    Not anxious nor agitated  Skin:  No rashes. No jaundice    Reviewed most current lab test results and cultures  YES  Reviewed most current radiology test results   YES  Review and summation of old records today    NO  Reviewed patient's current orders and MAR    YES  PMH/ reviewed - no change compared to H&P  ________________________________________________________________________  Care Plan discussed with:    Comments   Patient x    Family      RN x    Care Manager     Consultant                        Multidiciplinary team rounds were held today with , nursing, pharmacist and clinical coordinator. Patient's plan of care was discussed; medications were reviewed and discharge planning was addressed. ________________________________________________________________________      Comments   >50% of visit spent in counseling and coordination of care x    ________________________________________________________________________  Chalmer Massing, MD     Procedures: see electronic medical records for all procedures/Xrays and details which were not copied into this note but were reviewed prior to creation of Plan. LABS:  I reviewed today's most current labs and imaging studies.   Pertinent labs include:  Recent Labs     07/30/20  0014 07/29/20  0358   WBC 10.3 8.7   HGB 13.6 12.8   HCT 43.8 42.4    128*     Recent Labs     07/31/20  0543 07/30/20  0014 07/29/20  0358 * 134* 133*   K 4.5 3.7 3.9   CL 95* 95* 98   CO2 36* 32 32   * 107* 121*   BUN 26* 20 22*   CREA 0.93 0.79 0.80   CA 9.5 8.8 8.5   MG 2.4 2.0  --    PHOS 2.5*  --   --    ALB 2.5* 2.3* 2.0*   TBILI 0.9 1.7* 1.7*   ALT 23 22 18

## 2020-07-31 NOTE — PROGRESS NOTES
Chart reviewed. Pt currently on BIPAP and PCO2 >80. Attempted to see pt for gentle exercise. Pt very drowsy and shaking her head no when asked if she wanted to work with therapy. Defer session and will continue to follow.

## 2020-07-31 NOTE — PROGRESS NOTES
Problem: Falls - Risk of  Goal: *Absence of Falls  Description: Document Benji White Fall Risk and appropriate interventions in the flowsheet. Outcome: Progressing Towards Goal  Note: Fall Risk Interventions:  Mobility Interventions: Assess mobility with egress test, OT consult for ADLs, Patient to call before getting OOB, PT Consult for mobility concerns, PT Consult for assist device competence, Strengthening exercises (ROM-active/passive), Utilize walker, cane, or other assistive device, Utilize gait belt for transfers/ambulation    Mentation Interventions: Adequate sleep, hydration, pain control, Evaluate medications/consider consulting pharmacy, Gait belt with transfers/ambulation, Increase mobility, More frequent rounding, Reorient patient, Toileting rounds, Update white board    Medication Interventions: Bed/chair exit alarm, Evaluate medications/consider consulting pharmacy, Patient to call before getting OOB, Teach patient to arise slowly, Utilize gait belt for transfers/ambulation    Elimination Interventions: Call light in reach, Patient to call for help with toileting needs, Toileting schedule/hourly rounds, Toilet paper/wipes in reach              Problem: Patient Education: Go to Patient Education Activity  Goal: Patient/Family Education  Outcome: Progressing Towards Goal     Problem: Pressure Injury - Risk of  Goal: *Prevention of pressure injury  Description: Document Gaston Scale and appropriate interventions in the flowsheet. Outcome: Progressing Towards Goal  Note: Pressure Injury Interventions:  Sensory Interventions: Assess changes in LOC, Avoid rigorous massage over bony prominences, Check visual cues for pain, Keep linens dry and wrinkle-free, Discuss PT/OT consult with provider, Float heels, Minimize linen layers, Maintain/enhance activity level, Monitor skin under medical devices, Pressure redistribution bed/mattress (bed type), Turn and reposition approx.  every two hours (pillows and wedges if needed)    Moisture Interventions: Absorbent underpads, Assess need for specialty bed, Check for incontinence Q2 hours and as needed, Internal/External urinary devices, Maintain skin hydration (lotion/cream), Minimize layers    Activity Interventions: Assess need for specialty bed, Pressure redistribution bed/mattress(bed type)    Mobility Interventions: Assess need for specialty bed, Float heels, Pressure redistribution bed/mattress (bed type), Turn and reposition approx.  every two hours(pillow and wedges)    Nutrition Interventions: Discuss nutritional consult with provider    Friction and Shear Interventions: Apply protective barrier, creams and emollients, Feet elevated on foot rest, Foam dressings/transparent film/skin sealants, Lift sheet, Lift team/patient mobility team, Minimize layers                Problem: Patient Education: Go to Patient Education Activity  Goal: Patient/Family Education  Outcome: Progressing Towards Goal     Problem: Breathing Pattern - Ineffective  Goal: *Absence of hypoxia  Outcome: Progressing Towards Goal  Goal: *Use of effective breathing techniques  Outcome: Progressing Towards Goal     Problem: Patient Education: Go to Patient Education Activity  Goal: Patient/Family Education  Outcome: Progressing Towards Goal     Problem: Non-Violent Restraints  Goal: *Removal from restraints as soon as assessed to be safe  Outcome: Progressing Towards Goal  Goal: *No harm/injury to patient while restraints in use  Outcome: Progressing Towards Goal  Goal: *Patient's dignity will be maintained  Outcome: Progressing Towards Goal  Goal: *Patient Specific Goal (EDIT GOAL, INSERT TEXT)  Outcome: Progressing Towards Goal  Goal: Non-violent Restaints:Standard Interventions  Outcome: Progressing Towards Goal  Goal: Non-violent Restraints:Patient Interventions  Outcome: Progressing Towards Goal  Goal: Patient/Family Education  Outcome: Progressing Towards Goal     Problem: Patient Education: Go to Patient Education Activity  Goal: Patient/Family Education  Outcome: Progressing Towards Goal     Problem: Patient Education: Go to Patient Education Activity  Goal: Patient/Family Education  Outcome: Progressing Towards Goal

## 2020-08-01 LAB
ARTERIAL PATENCY WRIST A: YES
BACTERIA SPEC CULT: NORMAL
BASE EXCESS BLD CALC-SCNC: 11 MMOL/L
BDY SITE: ABNORMAL
CA-I BLD-SCNC: 1.37 MMOL/L (ref 1.12–1.32)
D DIMER PPP FEU-MCNC: 1.28 MG/L FEU (ref 0–0.65)
FIBRINOGEN PPP-MCNC: 457 MG/DL (ref 200–475)
GAS FLOW.O2 O2 DELIVERY SYS: ABNORMAL L/MIN
GLUCOSE BLD STRIP.AUTO-MCNC: 119 MG/DL (ref 65–100)
GLUCOSE BLD STRIP.AUTO-MCNC: 141 MG/DL (ref 65–100)
GLUCOSE BLD STRIP.AUTO-MCNC: 144 MG/DL (ref 65–100)
GLUCOSE BLD STRIP.AUTO-MCNC: 161 MG/DL (ref 65–100)
HCO3 BLD-SCNC: 37.3 MMOL/L (ref 22–26)
O2/TOTAL GAS SETTING VFR VENT: 40 %
PCO2 BLD: 79 MMHG (ref 35–45)
PEEP RESPIRATORY: 8 CMH2O
PH BLD: 7.28 [PH] (ref 7.35–7.45)
PIP ISTAT,IPIP: 14
PO2 BLD: 82 MMHG (ref 80–100)
SAO2 % BLD: 94 % (ref 92–97)
SERVICE CMNT-IMP: ABNORMAL
SERVICE CMNT-IMP: NORMAL
SPECIMEN TYPE: ABNORMAL
TOTAL RESP. RATE, ITRR: 25

## 2020-08-01 PROCEDURE — 85379 FIBRIN DEGRADATION QUANT: CPT

## 2020-08-01 PROCEDURE — 74011250636 HC RX REV CODE- 250/636: Performed by: INTERNAL MEDICINE

## 2020-08-01 PROCEDURE — 82803 BLOOD GASES ANY COMBINATION: CPT

## 2020-08-01 PROCEDURE — 74011000258 HC RX REV CODE- 258: Performed by: INTERNAL MEDICINE

## 2020-08-01 PROCEDURE — 65610000006 HC RM INTENSIVE CARE

## 2020-08-01 PROCEDURE — 77010033678 HC OXYGEN DAILY

## 2020-08-01 PROCEDURE — 74011250637 HC RX REV CODE- 250/637: Performed by: INTERNAL MEDICINE

## 2020-08-01 PROCEDURE — 94660 CPAP INITIATION&MGMT: CPT

## 2020-08-01 PROCEDURE — 85384 FIBRINOGEN ACTIVITY: CPT

## 2020-08-01 PROCEDURE — 74011636637 HC RX REV CODE- 636/637: Performed by: INTERNAL MEDICINE

## 2020-08-01 PROCEDURE — 36600 WITHDRAWAL OF ARTERIAL BLOOD: CPT

## 2020-08-01 PROCEDURE — 74011000250 HC RX REV CODE- 250: Performed by: INTERNAL MEDICINE

## 2020-08-01 PROCEDURE — 82962 GLUCOSE BLOOD TEST: CPT

## 2020-08-01 PROCEDURE — 36415 COLL VENOUS BLD VENIPUNCTURE: CPT

## 2020-08-01 RX ORDER — BUMETANIDE 0.25 MG/ML
1 INJECTION INTRAMUSCULAR; INTRAVENOUS
Status: COMPLETED | OUTPATIENT
Start: 2020-08-01 | End: 2020-08-01

## 2020-08-01 RX ORDER — METOPROLOL TARTRATE 25 MG/1
25 TABLET, FILM COATED ORAL 2 TIMES DAILY
Status: DISCONTINUED | OUTPATIENT
Start: 2020-08-01 | End: 2020-08-01

## 2020-08-01 RX ORDER — ENOXAPARIN SODIUM 100 MG/ML
40 INJECTION SUBCUTANEOUS EVERY 12 HOURS
Status: DISCONTINUED | OUTPATIENT
Start: 2020-08-01 | End: 2020-08-07 | Stop reason: HOSPADM

## 2020-08-01 RX ORDER — LEVOFLOXACIN 5 MG/ML
750 INJECTION, SOLUTION INTRAVENOUS EVERY 24 HOURS
Status: COMPLETED | OUTPATIENT
Start: 2020-08-02 | End: 2020-08-03

## 2020-08-01 RX ADMIN — MICONAZOLE NITRATE: 20 CREAM TOPICAL at 09:00

## 2020-08-01 RX ADMIN — Medication 1 AMPULE: at 20:52

## 2020-08-01 RX ADMIN — CLOTRIMAZOLE: 10 CREAM TOPICAL at 09:00

## 2020-08-01 RX ADMIN — INSULIN LISPRO 2 UNITS: 100 INJECTION, SOLUTION INTRAVENOUS; SUBCUTANEOUS at 16:15

## 2020-08-01 RX ADMIN — Medication 10 ML: at 05:19

## 2020-08-01 RX ADMIN — ENOXAPARIN SODIUM 40 MG: 40 INJECTION, SOLUTION INTRAVENOUS; SUBCUTANEOUS at 11:46

## 2020-08-01 RX ADMIN — CASTOR OIL AND BALSAM, PERU: 788; 87 OINTMENT TOPICAL at 04:33

## 2020-08-01 RX ADMIN — METHYLPREDNISOLONE SODIUM SUCCINATE 20 MG: 40 INJECTION, POWDER, FOR SOLUTION INTRAMUSCULAR; INTRAVENOUS at 18:40

## 2020-08-01 RX ADMIN — CASTOR OIL AND BALSAM, PERU: 788; 87 OINTMENT TOPICAL at 17:35

## 2020-08-01 RX ADMIN — CHLORHEXIDINE GLUCONATE 15 ML: 0.12 RINSE ORAL at 09:01

## 2020-08-01 RX ADMIN — INSULIN LISPRO 2 UNITS: 100 INJECTION, SOLUTION INTRAVENOUS; SUBCUTANEOUS at 11:23

## 2020-08-01 RX ADMIN — METOPROLOL TARTRATE 25 MG: 25 TABLET, FILM COATED ORAL at 18:28

## 2020-08-01 RX ADMIN — DEXTROSE MONOHYDRATE AND SODIUM CHLORIDE 25 ML/HR: 5; .45 INJECTION, SOLUTION INTRAVENOUS at 16:20

## 2020-08-01 RX ADMIN — Medication 1 AMPULE: at 09:37

## 2020-08-01 RX ADMIN — Medication 10 ML: at 13:37

## 2020-08-01 RX ADMIN — MICONAZOLE NITRATE: 20 CREAM TOPICAL at 17:34

## 2020-08-01 RX ADMIN — Medication 10 ML: at 21:57

## 2020-08-01 RX ADMIN — ASPIRIN 81 MG CHEWABLE TABLET 81 MG: 81 TABLET CHEWABLE at 09:41

## 2020-08-01 RX ADMIN — CLOTRIMAZOLE: 10 CREAM TOPICAL at 17:36

## 2020-08-01 RX ADMIN — BUMETANIDE 1 MG: 0.25 INJECTION INTRAMUSCULAR; INTRAVENOUS at 13:34

## 2020-08-01 RX ADMIN — WATER 2 G: 1 INJECTION INTRAMUSCULAR; INTRAVENOUS; SUBCUTANEOUS at 05:19

## 2020-08-01 RX ADMIN — WATER 2 G: 1 INJECTION INTRAMUSCULAR; INTRAVENOUS; SUBCUTANEOUS at 13:36

## 2020-08-01 NOTE — PROGRESS NOTES
PULMONARY/Critical Care/SLEEP MEDICINE  [facility]  Name: Snow Mckeon   : 1960   MRN: [id number]   Date: 2020 9:33 AM       [x]      The patient is critically ill on      []      Mechanical ventilation []      Pressors   [x]      BiPAP []         Problems    1. Acute respiratory failure with hypercarbia, hypoxia- she is NIV dependent and at high risk of deterioration if she stops using it; extubated  and wore BIPAP all night,  No blood gas checked today, she is awake and alert  2. Afib with RVR now on IV amiodarone,  Was placed on Lopressor, mean arterial pressure has been a little low  3. Staph bronchitis vs early bronchopneumonia  4. CHF ? Systolic/diastolic? Good diuresis  5. Hypotension ? Sedation related  6. Ventilator dependence  7. obesity  8. High suspicion JOSLYN  9. Hypercapnea  10. H/o tobacco   11. Asthma hx         Plan:    Wean BiPAP during the day, continue at night  Wean oxygen as tolerated  Diuretics as tolerated  Decrease dose of metoprolol and hold for systolic blood pressure less than 90  Prn Diuretics as tolerated  bronchial hygine  Culture  Sputum  Empiric abx  DVT ppx  Pt/ OT  Outpatient PFT  Outpatient sleep study   critically ill at risk for further decline. Discussed with RN. See orders for details      ·   [x]      Total critical care time exclusive of procedures   35    minutes      Patient was seen and examined earlier today. Was noted to be off BiPAP when seen. Tolerating nasal cannula, reported shortness of breath was slightly better. Not acutely bronchospastic. RN as noticed lower maps on higher dose of metoprolol with the dose of which was adjusted. No blood gas has been done today but she is alert and awake and most likely has chronic respiratory acidosis related to obesity hypoventilation syndrome and underlying COPD     BIPAP all night after extubation yesterday; ABg off BIPAP- PCO2 now> 80. Sleepy all day and worse off BIPAP. Sinus tachycardia. Hungry     AFRVR this AM. On vent. No chest pain. No fever. Good UOP after diuresis     tires easily and becomes sleepy. Failed SBt due to same behavior. High RSBI. Never had sleep study      failed SBT due to apnea. culture probable staph       thick secretions. Alert on vent. Alkalotic. Failed SBT this weeekkend           Alert . Rapid shallow breathing on PSV              Vital Signs:    Visit Vitals  /54   Pulse 83   Temp 98.9 °F (37.2 °C)   Resp 24   Ht 5' 3\" (1.6 m)   Wt 143.5 kg (316 lb 5.8 oz)   SpO2 96%   BMI 56.04 kg/m²       O2 Device: Nasal cannula   O2 Flow Rate (L/min): 4 l/min   Temp (24hrs), Av.4 °F (36.9 °C), Min:97.6 °F (36.4 °C), Max:98.9 °F (37.2 °C)       Intake/Output:   Last shift:      701 - 1900  In: 598 [P.O.:260; I.V.:195]  Out: 225 [Urine:225]  Last 3 shifts: 1901 -  0700  In: 1997.7 [P.O.:600;  I.V.:1397.7]  Out: 1145 [Urine:1145]    Intake/Output Summary (Last 24 hours) at 2020 1410  Last data filed at 2020 1400  Gross per 24 hour   Intake 1220 ml   Output 615 ml   Net 605 ml     Hemodynamics:   PAP:     Wedge:     CVP:  CVP (mmHg): 26 mmHg (20 1300)   CO:     CI:     SVR:     PVR:        Ventilator Settings:  Ventilator Mode: Spontaneous  Respiratory Rate  Resp Rate Observed: 29  Back-Up Rate: 6  Insp Flow (l/min): 60 l/min  I:E Ratio: 1:5.5  Ventilator Volumes  Vt Set (ml): 480 ml  Vt Exhaled (Machine Breath) (ml): 367 ml  Vt Spont (ml): 285 ml  Ve Observed (l/min): 9.5 l/min  Ventilator Pressures  PC Set: 6  Pressure Support (cm H2O): 10 cm H2O  PIP Observed (cm H2O): 20 cm H2O  Plateau Pressure (cm H2O): 26 cm H2O  MAP (cm H2O): 11  PEEP/VENT (cm H2O): 6 cm H20  Auto PEEP Observed (cm H2O): 0 cm H2O    Physical Exam:    General: [x]      Intubated/sedated []      No acute distress []          []      Ill appearing []       []            HEENT: [x]     ETT []      PERRL []     NGT     [] Anicteric Mucosa:[]      moist   []      dry []            Resp: [x]      Wheeze []      Clear to ascultation bilaterally []      Accessory muscle use    []      Rales []      Chest tube:  []      Air leak []          []      Ronchi []      Crepitus []         []      Coarse bilateral ventilator breath sounds      CV: [x]      Regular []      Tachycardia []          []      Irregular []      Bradycardic []          []      Murmur []      S1 []          []     RUB []    S2 []            GI: [x]      Soft  []      NG Tube Bowel sounds: []      present []      absent    []      Firm []      PEG []    Tender      []      Distended  []            : []      Petersen []      Hematuria []          []      Clear urine []       []            MSK:    []      SCDs []      Edema []   clubbing       []      No deformities []     Cyanosis  []            Skin: []      Warm [x]      Dry  []   Mottled       []      Cool []      Moist []          []     Lesions []      Diaphoretic []          []      Rash  []      Cyanosis []            N-psych: []      Sedated  []      Agitated []     Moves all extremities     [x]      Alert  Follows commands:   [x]      Yes  []      No []     oriented       Devices: []      PA Catheter []      Tracheostomy []          []      Central Line []        []        Chest tube:  Air leak? []        Y/[]        N    []          []      PICC []       []            DATA:   Current Facility-Administered Medications   Medication Dose Route Frequency    metoprolol tartrate (LOPRESSOR) tablet 25 mg  25 mg Oral BID    enoxaparin (LOVENOX) injection 40 mg  40 mg SubCUTAneous Q12H    ceFAZolin (ANCEF) 2 g in sterile water (preservative free) 20 mL IV syringe  2 g IntraVENous Q8H    insulin lispro (HUMALOG) injection   SubCUTAneous AC&HS    dextrose 5 % - 0.45% NaCl infusion  25 mL/hr IntraVENous CONTINUOUS    clotrimazole (LOTRIMIN) 1 % cream   Topical BID    miconazole (SECURA) 2 % extra thick cream   Topical BID    phenol throat spray (CHLORASEPTIC) 1 Spray  1 Spray Oral PRN    chlorhexidine (ORAL CARE KIT) 0.12 % mouthwash 15 mL  15 mL Oral Q12H    balsam peru-castor oiL (VENELEX) ointment   Topical Q12H    alcohol 62% (NOZIN) nasal  1 Ampule  1 Ampule Topical Q12H    glucose chewable tablet 16 g  4 Tab Oral PRN    dextrose (D50W) injection syrg 12.5-25 g  12.5-25 g IntraVENous PRN    glucagon (GLUCAGEN) injection 1 mg  1 mg IntraMUSCular PRN    sodium chloride (NS) flush 5-40 mL  5-40 mL IntraVENous Q8H    sodium chloride (NS) flush 5-40 mL  5-40 mL IntraVENous PRN    acetaminophen (TYLENOL) tablet 650 mg  650 mg Oral Q6H PRN    Or    acetaminophen (TYLENOL) suppository 650 mg  650 mg Rectal Q6H PRN    polyethylene glycol (MIRALAX) packet 17 g  17 g Oral DAILY PRN    ondansetron (ZOFRAN) injection 4 mg  4 mg IntraVENous Q6H PRN    aspirin chewable tablet 81 mg  81 mg Oral DAILY    [Held by provider] metoprolol tartrate (LOPRESSOR) tablet 25 mg  25 mg Oral BID    [Held by provider] atorvastatin (LIPITOR) tablet 10 mg  10 mg Oral DAILY    hydrALAZINE (APRESOLINE) 20 mg/mL injection 20 mg  20 mg IntraVENous Q6H PRN       Telemetry: []        Sinus []        A-flutter []        Paced    []        A-fib []        Multiple PVCs                  Labs:  Recent Labs     07/30/20  0014   WBC 10.3   HGB 13.6   HCT 43.8        Recent Labs     07/31/20  0543 07/30/20  0014   * 134*   K 4.5 3.7   CL 95* 95*   CO2 36* 32   * 107*   BUN 26* 20   CREA 0.93 0.79   CA 9.5 8.8   MG 2.4 2.0   PHOS 2.5*  --    ALB 2.5* 2.3*   TBILI 0.9 1.7*   ALT 23 22     No results for input(s): PH, PCO2, PO2, HCO3, FIO2 in the last 72 hours.     Imaging:  [x]      I have personally reviewed the patients radiographs     []      No change from prior, tubes and lines in adequate position  []      Improved   []      Worsening     IMPRESSION:     The patient is: [x]       acutely ill Risk of deterioration: [] moderate    [x]       critically ill  [x]       high     Active Problems:    COVID-19 ruled out (7/24/2020)          Leigha Rojo MD

## 2020-08-01 NOTE — PROGRESS NOTES
Hospitalist Progress Note    NAME: Vic Mckeon   :  1960   MRN:  [id number]     Admit date: 2020    Today's date: 20    PCP: MOISE Traore M.D. Cell [phone]    Assessment / Plan:  Acute respiratory failure with hypoxia and hypercarbia POA s/p intubation   Probable acute on chronic diastolic CHF POA LVEF 09-31%  ? Acute bronchitis POA   Underlying JOSLYN or OHS POA with Body mass index is 58.58 kg/m².   Metabolic encephalopathy POA due to hypercarbia   COVID-19 rule out POA testing negative x 2  Baseline does not wear home o2, admits to chronic CHOPRA  Past few days worsening CHOPRA and SOB at rest  Positive LE edema and orthopnea  No fevers, positive dry cough  HCO3 elevated at admit 36, suspect some chronic CO2 retention at baseline  CXR with bilateral interstitial edema   pBNP 2,987  procalcitonin < 0.05  Differential; Acute left CHF, atypical pneumonia    extubated   No clear pneumonia, pulm added antibiotics, sputum culture showed MSSA   Will DC vanco and s/p cefipime , now on cefazolin   S/p BIPap, now on NC , prn BIPAP or at bedtime       Afib with RVR   Started on amio drip , off the amio drip   Now on BB     Thrombocytopenia resolved   PLTs dropped from 210,000 to 120,000, improved to normal   Resume lovenox    unlikely POP  platelets 159  Normal coags, fibrinogen     Essential HTN POA  Hyperlipidemia POA  Resume  metoprolol and lipitor PRN labetalol  LDL 55,      DM type 2 POA  Not currently on treatment  POC, SSI  HgBa1c 6.0%     Remote tobacco use quit 40 years ago     Impaired mobility POA due to DJD knees  Not walking much at home, limited by knee pain     Fungal rash in trunk skin folds POA  Topical nystatin    Morbid obesity POA Body mass index is 56.04 kg/m².      DVT prophylaxis with lovenox     Code status: full code  NOK: daughter     Subjective:     Chief Complaint / Reason for Physician Visit f/u acute respiratory failure  FU CHF/pneumonia  Comfortable on nasal canula , off BIPAP   Review of Systems:  Symptom Y/N Comments  Symptom Y/N Comments   Fever/Chills n   Chest Pain n    Poor Appetite    Edema     Cough n   Abdominal Pain n    Sputum    Joint Pain     SOB/CHOPRA y   Headache     Nausea/vomit n   Tolerating PT/OT     Diarrhea    Tolerating Diet y    Constipation    Other       Could NOT obtain due to:      Objective:     VITALS:   Last 24hrs VS reviewed since prior progress note.  Most recent are:  Patient Vitals for the past 24 hrs:   Temp Pulse Resp BP SpO2   08/01/20 1000  79 24 122/60 99 %   08/01/20 0900  80 19 108/88 100 %   08/01/20 0800 97.6 °F (36.4 °C) 74 23 110/66 99 %   08/01/20 0723     99 %   08/01/20 0700  73 21  100 %   08/01/20 0600  75  124/77 97 %   08/01/20 0500  71 24 114/53 100 %   08/01/20 0417     100 %   08/01/20 0400 98.2 °F (36.8 °C) 69 25 109/40 100 %   08/01/20 0300  71 17 131/62 98 %   08/01/20 0200  72 24 107/49 96 %   08/01/20 0100  71 23 (!) 106/91 96 %   08/01/20 0045     96 %   08/01/20 0000 98.4 °F (36.9 °C) 70 24 101/51 93 %   07/31/20 2300  70 25 105/53 92 %   07/31/20 2200  74 25 103/42 93 %   07/31/20 2108     96 %   07/31/20 2100  75 22 101/60 94 %   07/31/20 2000 98.5 °F (36.9 °C) 71 20 101/53 97 %   07/31/20 1800  80 25 114/58 97 %   07/31/20 1700  76 23 106/66 96 %   07/31/20 1600 98.7 °F (37.1 °C) 78 23 106/52 96 %   07/31/20 1507     95 %   07/31/20 1500  81 23 115/58 95 %   07/31/20 1400  87 24 133/53 96 %   07/31/20 1300  93 25 132/48 96 %   07/31/20 1205     96 %   07/31/20 1200 99 °F (37.2 °C) 99 20 152/51 96 %       Intake/Output Summary (Last 24 hours) at 8/1/2020 1112  Last data filed at 8/1/2020 1000  Gross per 24 hour   Intake 949.8 ml   Output 615 ml   Net 334.8 ml        Wt Readings from Last 12 Encounters:   07/31/20 143.5 kg (316 lb 5.8 oz)   12/27/19 136.1 kg (300 lb)   01/30/19 136.1 kg (300 lb)   11/13/15 134.7 kg (297 lb)   03/17/15 140.1 kg (308 lb 13.8 oz)       PHYSICAL EXAM:  General: WD, WN.extubated , alert, on nasal canula   EENT:  PERRL. Anicteric sclerae. ETT in place  Resp:  Crackles at bases bilaterally, no wheezing. No accessory muscle use  CV:  Regular  rhythm,  1+ edema  GI:  Soft, Non distended, Non tender. +Bowel sounds, no rebound  LN:  No cervical or inguinal MONICA  Neurologic:  AAOx4 , no focal deficit   Psych:    Not anxious nor agitated  Skin:  No rashes. No jaundice    Reviewed most current lab test results and cultures  YES  Reviewed most current radiology test results   YES  Review and summation of old records today    NO  Reviewed patient's current orders and MAR    YES  PMH/SH reviewed - no change compared to H&P  ________________________________________________________________________  Care Plan discussed with:    Comments   Patient x    Family      RN x    Care Manager     Consultant                        Multidiciplinary team rounds were held today with , nursing, pharmacist and clinical coordinator. Patient's plan of care was discussed; medications were reviewed and discharge planning was addressed. ________________________________________________________________________      Comments   >50% of visit spent in counseling and coordination of care x    ________________________________________________________________________  Troy Martinez MD     Procedures: see electronic medical records for all procedures/Xrays and details which were not copied into this note but were reviewed prior to creation of Plan. LABS:  I reviewed today's most current labs and imaging studies.   Pertinent labs include:  Recent Labs     07/30/20  0014   WBC 10.3   HGB 13.6   HCT 43.8        Recent Labs     07/31/20  0543 07/30/20  0014   * 134*   K 4.5 3.7   CL 95* 95*   CO2 36* 32   * 107*   BUN 26* 20   CREA 0.93 0.79   CA 9.5 8.8   MG 2.4 2.0   PHOS 2.5*  --    ALB 2.5* 2.3*   TBILI 0.9 1.7*   ALT 23 22

## 2020-08-01 NOTE — PROGRESS NOTES
1900 Bedside and Verbal shift change report given to MARIE Angulo (oncoming nurse) by Sonja Tucker (offgoing nurse). Report included the following information SBAR, Kardex, ED Summary, Intake/Output, MAR, Recent Results and Cardiac Rhythm NSR.     2000 shift assessment complete, see flowsheet for details. Pt is alert and oriented but takes a while to give the correct answer. Some gazing off.     2055 Pt placed on Bipap 14/8/4/40%    0000 reassessment complete, Pt refusing to talk will only mouth words but nodding correctly to questions. 0200 central line dressing changed, it was falling off due to patient moving and itching. 0430 reassessment complete, pt talking a little more. Tolerating Bipap. Labs drawn and sent. 0700 Bedside and Verbal shift change report given to MARIE Chamorro (oncoming nurse) by Terrance Lui (offgoing nurse). Report included the following information SBAR, Kardex, ED Summary, Intake/Output, MAR, Recent Results and Cardiac Rhythm NSR.

## 2020-08-01 NOTE — PROGRESS NOTES
Bipap removed and patient placed on 4 LPM nasal cannula. Tolerating nasal cannula well. O2 sat 98%. 9606 Dr. Mathieu Gastelum in to see patient. 1200 Patient urine output less than 30cc/hour over past 2 hours. Dr. Erin Del Rio notified. 1334 Bumex 1mg IV given. 1654 Patient with dark red bumpy rash on lateral side of left breast, back rash redder at this time, and rash noted on left and right thighs. Dr. Sang Anderson paged. 46 Dr. Sang Anderson called back and updated on patients rash. Order received for solumedrol 20mg IV and antibiotics changed. 1900 No changes. Report to Dion Padilla RN.

## 2020-08-02 LAB
ALBUMIN SERPL-MCNC: 2.7 G/DL (ref 3.5–5)
ALBUMIN/GLOB SERPL: 0.6 {RATIO} (ref 1.1–2.2)
ALP SERPL-CCNC: 81 U/L (ref 45–117)
ALT SERPL-CCNC: 16 U/L (ref 12–78)
ANION GAP SERPL CALC-SCNC: ABNORMAL MMOL/L (ref 5–15)
ARTERIAL PATENCY WRIST A: YES
AST SERPL-CCNC: 35 U/L (ref 15–37)
BASE EXCESS BLD CALC-SCNC: 13 MMOL/L
BASOPHILS # BLD: 0 K/UL (ref 0–0.1)
BASOPHILS NFR BLD: 0 % (ref 0–1)
BDY SITE: ABNORMAL
BILIRUB SERPL-MCNC: 0.6 MG/DL (ref 0.2–1)
BUN SERPL-MCNC: 35 MG/DL (ref 6–20)
BUN/CREAT SERPL: 39 (ref 12–20)
CA-I BLD-SCNC: 1.31 MMOL/L (ref 1.12–1.32)
CALCIUM SERPL-MCNC: 9.4 MG/DL (ref 8.5–10.1)
CHLORIDE SERPL-SCNC: 96 MMOL/L (ref 97–108)
CO2 SERPL-SCNC: 39 MMOL/L (ref 21–32)
CREAT SERPL-MCNC: 0.9 MG/DL (ref 0.55–1.02)
D DIMER PPP FEU-MCNC: 1.27 MG/L FEU (ref 0–0.65)
DIFFERENTIAL METHOD BLD: ABNORMAL
EOSINOPHIL # BLD: 0 K/UL (ref 0–0.4)
EOSINOPHIL NFR BLD: 1 % (ref 0–7)
ERYTHROCYTE [DISTWIDTH] IN BLOOD BY AUTOMATED COUNT: 13.9 % (ref 11.5–14.5)
FIBRINOGEN PPP-MCNC: 403 MG/DL (ref 200–475)
GAS FLOW.O2 O2 DELIVERY SYS: ABNORMAL L/MIN
GLOBULIN SER CALC-MCNC: 4.9 G/DL (ref 2–4)
GLUCOSE BLD STRIP.AUTO-MCNC: 109 MG/DL (ref 65–100)
GLUCOSE BLD STRIP.AUTO-MCNC: 128 MG/DL (ref 65–100)
GLUCOSE BLD STRIP.AUTO-MCNC: 141 MG/DL (ref 65–100)
GLUCOSE BLD STRIP.AUTO-MCNC: 141 MG/DL (ref 65–100)
GLUCOSE SERPL-MCNC: 159 MG/DL (ref 65–100)
HCO3 BLD-SCNC: 39.7 MMOL/L (ref 22–26)
HCT VFR BLD AUTO: 42.9 % (ref 35–47)
HGB BLD-MCNC: 12.6 G/DL (ref 11.5–16)
IMM GRANULOCYTES # BLD AUTO: 0.1 K/UL (ref 0–0.04)
IMM GRANULOCYTES NFR BLD AUTO: 1 % (ref 0–0.5)
LYMPHOCYTES # BLD: 0.5 K/UL (ref 0.8–3.5)
LYMPHOCYTES NFR BLD: 7 % (ref 12–49)
MAGNESIUM SERPL-MCNC: 2.6 MG/DL (ref 1.6–2.4)
MCH RBC QN AUTO: 31 PG (ref 26–34)
MCHC RBC AUTO-ENTMCNC: 29.4 G/DL (ref 30–36.5)
MCV RBC AUTO: 105.7 FL (ref 80–99)
MONOCYTES # BLD: 0.3 K/UL (ref 0–1)
MONOCYTES NFR BLD: 4 % (ref 5–13)
NEUTS SEG # BLD: 6.4 K/UL (ref 1.8–8)
NEUTS SEG NFR BLD: 87 % (ref 32–75)
NRBC # BLD: 0 K/UL (ref 0–0.01)
NRBC BLD-RTO: 0 PER 100 WBC
O2/TOTAL GAS SETTING VFR VENT: 40 %
PCO2 BLD: 83.6 MMHG (ref 35–45)
PEEP RESPIRATORY: 8 CMH2O
PH BLD: 7.29 [PH] (ref 7.35–7.45)
PHOSPHATE SERPL-MCNC: 1.6 MG/DL (ref 2.6–4.7)
PIP ISTAT,IPIP: 14
PLATELET # BLD AUTO: 152 K/UL (ref 150–400)
PMV BLD AUTO: 12.2 FL (ref 8.9–12.9)
PO2 BLD: 104 MMHG (ref 80–100)
POTASSIUM SERPL-SCNC: 4.9 MMOL/L (ref 3.5–5.1)
PROT SERPL-MCNC: 7.6 G/DL (ref 6.4–8.2)
RBC # BLD AUTO: 4.06 M/UL (ref 3.8–5.2)
SAO2 % BLD: 97 % (ref 92–97)
SERVICE CMNT-IMP: ABNORMAL
SODIUM SERPL-SCNC: 134 MMOL/L (ref 136–145)
SPECIMEN TYPE: ABNORMAL
TOTAL RESP. RATE, ITRR: 25
WBC # BLD AUTO: 7.4 K/UL (ref 3.6–11)

## 2020-08-02 PROCEDURE — 74011250636 HC RX REV CODE- 250/636: Performed by: INTERNAL MEDICINE

## 2020-08-02 PROCEDURE — 65610000006 HC RM INTENSIVE CARE

## 2020-08-02 PROCEDURE — 83735 ASSAY OF MAGNESIUM: CPT

## 2020-08-02 PROCEDURE — 74011250637 HC RX REV CODE- 250/637: Performed by: INTERNAL MEDICINE

## 2020-08-02 PROCEDURE — 36415 COLL VENOUS BLD VENIPUNCTURE: CPT

## 2020-08-02 PROCEDURE — 80053 COMPREHEN METABOLIC PANEL: CPT

## 2020-08-02 PROCEDURE — 77010033678 HC OXYGEN DAILY

## 2020-08-02 PROCEDURE — 82962 GLUCOSE BLOOD TEST: CPT

## 2020-08-02 PROCEDURE — 84100 ASSAY OF PHOSPHORUS: CPT

## 2020-08-02 PROCEDURE — 85025 COMPLETE CBC W/AUTO DIFF WBC: CPT

## 2020-08-02 PROCEDURE — 85384 FIBRINOGEN ACTIVITY: CPT

## 2020-08-02 PROCEDURE — 74011636637 HC RX REV CODE- 636/637: Performed by: INTERNAL MEDICINE

## 2020-08-02 PROCEDURE — 85379 FIBRIN DEGRADATION QUANT: CPT

## 2020-08-02 PROCEDURE — 94660 CPAP INITIATION&MGMT: CPT

## 2020-08-02 PROCEDURE — 36600 WITHDRAWAL OF ARTERIAL BLOOD: CPT

## 2020-08-02 PROCEDURE — 82803 BLOOD GASES ANY COMBINATION: CPT

## 2020-08-02 RX ORDER — DIPHENHYDRAMINE HCL 25 MG
25 CAPSULE ORAL
Status: DISCONTINUED | OUTPATIENT
Start: 2020-08-02 | End: 2020-08-07 | Stop reason: HOSPADM

## 2020-08-02 RX ORDER — SODIUM,POTASSIUM PHOSPHATES 280-250MG
2 POWDER IN PACKET (EA) ORAL
Status: COMPLETED | OUTPATIENT
Start: 2020-08-02 | End: 2020-08-02

## 2020-08-02 RX ADMIN — ENOXAPARIN SODIUM 40 MG: 40 INJECTION, SOLUTION INTRAVENOUS; SUBCUTANEOUS at 00:20

## 2020-08-02 RX ADMIN — DIPHENHYDRAMINE HYDROCHLORIDE, ZINC ACETATE: 2; .1 CREAM TOPICAL at 18:46

## 2020-08-02 RX ADMIN — Medication 1 AMPULE: at 09:37

## 2020-08-02 RX ADMIN — CASTOR OIL AND BALSAM, PERU: 788; 87 OINTMENT TOPICAL at 17:09

## 2020-08-02 RX ADMIN — METOPROLOL TARTRATE 25 MG: 25 TABLET, FILM COATED ORAL at 12:18

## 2020-08-02 RX ADMIN — LEVOFLOXACIN 750 MG: 5 INJECTION, SOLUTION INTRAVENOUS at 07:37

## 2020-08-02 RX ADMIN — POTASSIUM & SODIUM PHOSPHATES POWDER PACK 280-160-250 MG 2 PACKET: 280-160-250 PACK at 09:49

## 2020-08-02 RX ADMIN — ENOXAPARIN SODIUM 40 MG: 40 INJECTION, SOLUTION INTRAVENOUS; SUBCUTANEOUS at 12:28

## 2020-08-02 RX ADMIN — MICONAZOLE NITRATE: 20 CREAM TOPICAL at 17:00

## 2020-08-02 RX ADMIN — ATORVASTATIN CALCIUM 10 MG: 20 TABLET, FILM COATED ORAL at 09:54

## 2020-08-02 RX ADMIN — INSULIN LISPRO 2 UNITS: 100 INJECTION, SOLUTION INTRAVENOUS; SUBCUTANEOUS at 16:55

## 2020-08-02 RX ADMIN — METOPROLOL TARTRATE 25 MG: 25 TABLET, FILM COATED ORAL at 17:57

## 2020-08-02 RX ADMIN — ENOXAPARIN SODIUM 40 MG: 40 INJECTION, SOLUTION INTRAVENOUS; SUBCUTANEOUS at 23:42

## 2020-08-02 RX ADMIN — Medication 10 ML: at 15:29

## 2020-08-02 RX ADMIN — CASTOR OIL AND BALSAM, PERU: 788; 87 OINTMENT TOPICAL at 06:19

## 2020-08-02 RX ADMIN — DIPHENHYDRAMINE HYDROCHLORIDE, ZINC ACETATE: 2; .1 CREAM TOPICAL at 21:06

## 2020-08-02 RX ADMIN — ASPIRIN 81 MG CHEWABLE TABLET 81 MG: 81 TABLET CHEWABLE at 09:49

## 2020-08-02 RX ADMIN — CLOTRIMAZOLE: 10 CREAM TOPICAL at 18:48

## 2020-08-02 RX ADMIN — Medication 10 ML: at 21:06

## 2020-08-02 RX ADMIN — CLOTRIMAZOLE: 10 CREAM TOPICAL at 09:38

## 2020-08-02 RX ADMIN — Medication 1 AMPULE: at 20:53

## 2020-08-02 RX ADMIN — MICONAZOLE NITRATE: 20 CREAM TOPICAL at 09:39

## 2020-08-02 RX ADMIN — Medication 10 ML: at 06:22

## 2020-08-02 NOTE — PROGRESS NOTES
PULMONARY/Critical Care/SLEEP MEDICINE  [facility]  Name: Konstantin Reese   : 1960   MRN: [id number]   Date: 2020 9:33 AM       [x]      The patient is critically ill on      []      Mechanical ventilation []      Pressors   []      BiPAP []         Problems    1. Acute respiratory failure with hypercarbia, hypoxia- she is NIV dependent and at high risk of deterioration if she stops using it; extubated  and wore BIPAP all night,  No blood gas checked today, she is awake and alert  2. Afib with RVR now on IV amiodarone,  Was placed on Lopressor, mean arterial pressure has been a little low  3. Staph bronchitis vs early bronchopneumonia  4. CHF ? Systolic/diastolic? Good diuresis  5. Hypotension ? Sedation related  6. Ventilator dependence  7. obesity  8. High suspicion JOSLYN  9. Hypercapnea  10. H/o tobacco   11. Asthma hx         Plan:    Wean BiPAP during the day, continue at night, on NC oxygen at present. Will replete the Phos  Wean oxygen as tolerated  Diuretics as tolerated  Decrease dose of metoprolol and hold for systolic blood pressure less than 90  Prn Diuretics as tolerated  bronchial hygine  Culture  Sputum  Empiric abx  DVT ppx  Pt/ OT  Outpatient PFT  Outpatient sleep study   critically ill at risk for further decline. Discussed with RN. See orders for details      ·     20: NO new issues. Was eating breakfast when seen. ON NC. Conversant.   Patient was seen and examined earlier today. Was noted to be off BiPAP when seen. Tolerating nasal cannula, reported shortness of breath was slightly better. Not acutely bronchospastic. RN as noticed lower maps on higher dose of metoprolol with the dose of which was adjusted.  No blood gas has been done today but she is alert and awake and most likely has chronic respiratory acidosis related to obesity hypoventilation syndrome and underlying COPD     BIPAP all night after extubation yesterday; ABg off BIPAP- PCO2 now> 80. Sleepy all day and worse off BIPAP. Sinus tachycardia. Hungry     AFRVR this AM. On vent. No chest pain. No fever. Good UOP after diuresis     tires easily and becomes sleepy. Failed SBt due to same behavior. High RSBI. Never had sleep study      failed SBT due to apnea. culture probable staph       thick secretions. Alert on vent. Alkalotic. Failed SBT this weeekkend           Alert . Rapid shallow breathing on PSV              Vital Signs:    Visit Vitals  BP 99/41   Pulse 73   Temp 98.4 °F (36.9 °C)   Resp 25   Ht 5' 3\" (1.6 m)   Wt 147.1 kg (324 lb 4.8 oz)   SpO2 96%   BMI 57.45 kg/m²       O2 Device: BIPAP   O2 Flow Rate (L/min): 4 l/min   Temp (24hrs), Av.2 °F (36.8 °C), Min:97.6 °F (36.4 °C), Max:98.9 °F (37.2 °C)       Intake/Output:   Last shift:      No intake/output data recorded.   Last 3 shifts:  190 -  0700  In: 1495 [P.O.:260; I.V.:1235]  Out: 1900 [Urine:1900]    Intake/Output Summary (Last 24 hours) at 2020 0831  Last data filed at 2020 0700  Gross per 24 hour   Intake 855 ml   Output 1545 ml   Net -690 ml     Hemodynamics:   PAP:     Wedge:     CVP:  CVP (mmHg): 14 mmHg (20 0700)   CO:     CI:     SVR:     PVR:        Ventilator Settings:  Ventilator Mode: Spontaneous  Respiratory Rate  Resp Rate Observed: 29  Back-Up Rate: 6  Insp Flow (l/min): 60 l/min  I:E Ratio: 1:5.5  Ventilator Volumes  Vt Set (ml): 480 ml  Vt Exhaled (Machine Breath) (ml): 367 ml  Vt Spont (ml): 252 ml  Ve Observed (l/min): 6.4 l/min  Ventilator Pressures  PC Set: 6  Pressure Support (cm H2O): 10 cm H2O  PIP Observed (cm H2O): 20 cm H2O  Plateau Pressure (cm H2O): 26 cm H2O  MAP (cm H2O): 11  PEEP/VENT (cm H2O): 6 cm H20  Auto PEEP Observed (cm H2O): 0 cm H2O    Physical Exam:    General: []      Intubated/sedated [x]      No acute distress []          []      Ill appearing []       []            HEENT: []     ETT []      PERRL []     NGT     [] Anicteric Mucosa:[]      moist   []      dry []            Resp: []      Wheeze [x]      Clear to ascultation bilaterally []      Accessory muscle use    []      Rales []      Chest tube:  []      Air leak []          []      Ronchi []      Crepitus []         []      Coarse bilateral ventilator breath sounds      CV: [x]      Regular []      Tachycardia []          []      Irregular []      Bradycardic []          []      Murmur []      S1 []          []     RUB []    S2 []            GI: [x]      Soft  []      NG Tube Bowel sounds: []      present []      absent    []      Firm []      PEG []    Tender      []      Distended  []            : []      Petersen []      Hematuria []          []      Clear urine []       []            MSK:    []      SCDs []      Edema []   clubbing       []      No deformities []     Cyanosis  []            Skin: []      Warm [x]      Dry  []   Mottled       []      Cool []      Moist []          []     Lesions []      Diaphoretic []          []      Rash  []      Cyanosis []            N-psych: []      Sedated  []      Agitated []     Moves all extremities     [x]      Alert  Follows commands:   [x]      Yes  []      No []     oriented       Devices: []      PA Catheter []      Tracheostomy []          [x]      Central Line []        []        Chest tube:  Air leak? []        Y/[]        N    []          []      PICC []       []            DATA:   Current Facility-Administered Medications   Medication Dose Route Frequency    diphenhydrAMINE (BENADRYL) capsule 25 mg  25 mg Oral Q6H PRN    enoxaparin (LOVENOX) injection 40 mg  40 mg SubCUTAneous Q12H    levoFLOXacin (LEVAQUIN) 750 mg in D5W IVPB  750 mg IntraVENous Q24H    insulin lispro (HUMALOG) injection   SubCUTAneous AC&HS    dextrose 5 % - 0.45% NaCl infusion  25 mL/hr IntraVENous CONTINUOUS    clotrimazole (LOTRIMIN) 1 % cream   Topical BID    miconazole (SECURA) 2 % extra thick cream   Topical BID    phenol throat spray (CHLORASEPTIC) 1 Spray  1 Spray Oral PRN    balsam peru-castor oiL (VENELEX) ointment   Topical Q12H    alcohol 62% (NOZIN) nasal  1 Ampule  1 Ampule Topical Q12H    glucose chewable tablet 16 g  4 Tab Oral PRN    dextrose (D50W) injection syrg 12.5-25 g  12.5-25 g IntraVENous PRN    glucagon (GLUCAGEN) injection 1 mg  1 mg IntraMUSCular PRN    sodium chloride (NS) flush 5-40 mL  5-40 mL IntraVENous Q8H    sodium chloride (NS) flush 5-40 mL  5-40 mL IntraVENous PRN    acetaminophen (TYLENOL) tablet 650 mg  650 mg Oral Q6H PRN    Or    acetaminophen (TYLENOL) suppository 650 mg  650 mg Rectal Q6H PRN    polyethylene glycol (MIRALAX) packet 17 g  17 g Oral DAILY PRN    ondansetron (ZOFRAN) injection 4 mg  4 mg IntraVENous Q6H PRN    aspirin chewable tablet 81 mg  81 mg Oral DAILY    metoprolol tartrate (LOPRESSOR) tablet 25 mg  25 mg Oral BID    atorvastatin (LIPITOR) tablet 10 mg  10 mg Oral DAILY    hydrALAZINE (APRESOLINE) 20 mg/mL injection 20 mg  20 mg IntraVENous Q6H PRN       Telemetry: []        Sinus []        A-flutter []        Paced    []        A-fib []        Multiple PVCs                  Labs:  Recent Labs     08/02/20  0429   WBC 7.4   HGB 12.6   HCT 42.9        Recent Labs     08/02/20  0429 07/31/20  0543   * 134*   K 4.9 4.5   CL 96* 95*   CO2 39* 36*   * 105*   BUN 35* 26*   CREA 0.90 0.93   CA 9.4 9.5   MG 2.6* 2.4   PHOS 1.6* 2.5*   ALB 2.7* 2.5*   TBILI 0.6 0.9   ALT 16 23     No results for input(s): PH, PCO2, PO2, HCO3, FIO2 in the last 72 hours.     Imaging:  [x]      I have personally reviewed the patients radiographs     []      No change from prior, tubes and lines in adequate position  []      Improved   []      Worsening     IMPRESSION:     The patient is: [x]       acutely ill Risk of deterioration: []       moderate    []       critically ill  []       high     Active Problems:    COVID-19 ruled out (7/24/2020)          Haroon Rivera MD

## 2020-08-02 NOTE — PROGRESS NOTES
Hospitalist Progress Note    NAME: Vic Mckeon   :  1960   MRN:  [id number]     Admit date: 2020    Today's date: 20    PCP: MOISE Traore M.D. Cell [phone]    Assessment / Plan:  Acute respiratory failure with hypoxia and hypercarbia POA s/p intubation   Probable acute on chronic diastolic CHF POA LVEF 06-18%  ? Acute bronchitis POA   Underlying JOSLYN or OHS POA with Body mass index is 58.58 kg/m². Metabolic encephalopathy POA due to hypercarbia   COVID-19 rule out POA testing negative x 2  Baseline does not wear home o2, admits to chronic CHOPRA  Past few days worsening CHOPRA and SOB at rest  Positive LE edema and orthopnea  No fevers, positive dry cough  HCO3 elevated at admit 36, suspect some chronic CO2 retention at baseline  CXR with bilateral interstitial edema   pBNP 2,987  procalcitonin < 0.05  Differential; Acute left CHF, atypical pneumonia    extubated   No clear pneumonia, pulm added antibiotics, sputum culture showed MSSA   Will DC vanco and s/p cefipime , switched to cefazolin . Cefazolin DC as pt was noticed tohave urticaria . Pt had gotten 6days of IV abx so far , will  Continue one more day and stop  S/p BIPap, now on NC , prn BIPAP and  at bedtime        Urticarial rash , allergic reaction?   Diffuse itchy urticareal rash since abx switched to cefazolin   Will DC keflex , switch to levaquin   Prn benadryl   Will update allergy list     Afib with RVR   Started on amio drip , off the amio drip   Now on BB     Thrombocytopenia resolved   PLTs dropped from 210,000 to 120,000, improved to normal   Resume lovenox    unlikely POP  platelets 251  Normal coags, fibrinogen     Essential HTN POA  Hyperlipidemia POA  Resume  metoprolol and lipitor PRN labetalol  LDL 55,      DM type 2 POA  Not currently on treatment  POC, SSI  HgBa1c 6.0%     Remote tobacco use quit 40 years ago     Impaired mobility POA due to DJD knees  Not walking much at home, limited by knee pain     Fungal rash in trunk skin folds POA  Topical nystatin    Morbid obesity POA Body mass index is 57.45 kg/m².      DVT prophylaxis with lovenox     Code status: full code  NOK: daughter     Subjective:     Chief Complaint / Reason for Physician Visit f/u acute respiratory failure  FU CHF/pneumonia  , c/o urticareal rash     Review of Systems:  Symptom Y/N Comments  Symptom Y/N Comments   Fever/Chills n   Chest Pain n    Poor Appetite    Edema     Cough n   Abdominal Pain n    Sputum    Joint Pain     SOB/CHOPRA y   Headache     Nausea/vomit n   Tolerating PT/OT     Diarrhea    Tolerating Diet y    Constipation    Other       Could NOT obtain due to:      Objective:     VITALS:   Last 24hrs VS reviewed since prior progress note.  Most recent are:  Patient Vitals for the past 24 hrs:   Temp Pulse Resp BP SpO2   08/02/20 1400  75 22 133/64 99 %   08/02/20 1300  82 22 125/52 94 %   08/02/20 1200 98.6 °F (37 °C) 80 20 119/87 98 %   08/02/20 1100  81 20 122/52 98 %   08/02/20 1000  85 20  99 %   08/02/20 0900  80 21 116/67 100 %   08/02/20 0800 97.8 °F (36.6 °C) 72 21 105/48 97 %   08/02/20 0700  73 25 99/41 96 %   08/02/20 0600  75  121/54 96 %   08/02/20 0500  73 27 115/60 96 %   08/02/20 0430 98.4 °F (36.9 °C) 73 23  97 %   08/02/20 0400  74  100/77 97 %   08/02/20 0347     97 %   08/02/20 0300  72 23 133/65 97 %   08/02/20 0200  74 22 120/56 96 %   08/02/20 0139  73 21 115/40 97 %   08/02/20 0100  73 26 (!) 132/112    08/02/20 0035     98 %   08/02/20 0015 97.9 °F (36.6 °C) 76 29  99 %   08/02/20 0000  78 23 155/88 (!) 88 %   08/01/20 2300  72 24 136/70 97 %   08/01/20 2200  74 24 136/63 98 %   08/01/20 2125     96 %   08/01/20 2100  77 22 139/63 96 %   08/01/20 2017     97 %   08/01/20 2000 97.6 °F (36.4 °C) 76 21 143/87 96 %   08/01/20 1900  80 23 143/63 100 %   08/01/20 1828  83  138/56    08/01/20 1800  82 20 138/56 98 % 08/01/20 1700  83 22 133/55    08/01/20 1600 98.2 °F (36.8 °C) 90 25 147/73 96 %   08/01/20 1500  84 26 135/68 96 %       Intake/Output Summary (Last 24 hours) at 8/2/2020 1437  Last data filed at 8/2/2020 1200  Gross per 24 hour   Intake 625 ml   Output 1605 ml   Net -980 ml        Wt Readings from Last 12 Encounters:   08/02/20 147.1 kg (324 lb 4.8 oz)   12/27/19 136.1 kg (300 lb)   01/30/19 136.1 kg (300 lb)   11/13/15 134.7 kg (297 lb)   03/17/15 140.1 kg (308 lb 13.8 oz)       PHYSICAL EXAM:  General: WD, WN.extubated , alert, on nasal canula   EENT:  PERRL. Anicteric sclerae. ETT in place  Resp:  Crackles at bases bilaterally, no wheezing. No accessory muscle use  CV:  Regular  rhythm,  1+ edema  GI:  Soft, Non distended, Non tender. +Bowel sounds, no rebound  LN:  No cervical or inguinal MONICA  Neurologic:  AAOx4 , no focal deficit   Psych:    Not anxious nor agitated  Skin:  Rashes +++    Reviewed most current lab test results and cultures  YES  Reviewed most current radiology test results   YES  Review and summation of old records today    NO  Reviewed patient's current orders and MAR    YES  PMH/ reviewed - no change compared to H&P  ________________________________________________________________________  Care Plan discussed with:    Comments   Patient x    Family      RN x    Care Manager     Consultant                        Multidiciplinary team rounds were held today with , nursing, pharmacist and clinical coordinator. Patient's plan of care was discussed; medications were reviewed and discharge planning was addressed.      ________________________________________________________________________      Comments   >50% of visit spent in counseling and coordination of care x    ________________________________________________________________________  Janusz Davies MD     Procedures: see electronic medical records for all procedures/Xrays and details which were not copied into this note but were reviewed prior to creation of Plan. LABS:  I reviewed today's most current labs and imaging studies.   Pertinent labs include:  Recent Labs     08/02/20 0429   WBC 7.4   HGB 12.6   HCT 42.9        Recent Labs     08/02/20 0429 07/31/20  0543   * 134*   K 4.9 4.5   CL 96* 95*   CO2 39* 36*   * 105*   BUN 35* 26*   CREA 0.90 0.93   CA 9.4 9.5   MG 2.6* 2.4   PHOS 1.6* 2.5*   ALB 2.7* 2.5*   TBILI 0.6 0.9   ALT 16 23

## 2020-08-02 NOTE — PROGRESS NOTES
Dr. Milton Teixeira in to see patient. 0830 Patient off of bipap and placed on nasal cannula at 4 LPM.   1200 Patient tolerating nasal cannula. O2 sats 98%, VSS.  1500 Dr. Liliam Morales in to see patient. 1900 No changes. Report to [email or web address].

## 2020-08-03 ENCOUNTER — APPOINTMENT (OUTPATIENT)
Dept: CT IMAGING | Age: 60
DRG: 207 | End: 2020-08-03
Attending: HOSPITALIST
Payer: MEDICARE

## 2020-08-03 ENCOUNTER — APPOINTMENT (OUTPATIENT)
Dept: GENERAL RADIOLOGY | Age: 60
DRG: 207 | End: 2020-08-03
Attending: INTERNAL MEDICINE
Payer: MEDICARE

## 2020-08-03 LAB
ANION GAP SERPL CALC-SCNC: ABNORMAL MMOL/L (ref 5–15)
ARTERIAL PATENCY WRIST A: YES
BASE EXCESS BLD CALC-SCNC: 15 MMOL/L
BDY SITE: ABNORMAL
BUN SERPL-MCNC: 28 MG/DL (ref 6–20)
BUN/CREAT SERPL: 39 (ref 12–20)
CA-I BLD-SCNC: 1.32 MMOL/L (ref 1.12–1.32)
CALCIUM SERPL-MCNC: 9.1 MG/DL (ref 8.5–10.1)
CHLORIDE SERPL-SCNC: 96 MMOL/L (ref 97–108)
CO2 SERPL-SCNC: 42 MMOL/L (ref 21–32)
CREAT SERPL-MCNC: 0.72 MG/DL (ref 0.55–1.02)
D DIMER PPP FEU-MCNC: 1.14 MG/L FEU (ref 0–0.65)
ERYTHROCYTE [DISTWIDTH] IN BLOOD BY AUTOMATED COUNT: 13.6 % (ref 11.5–14.5)
FIBRINOGEN PPP-MCNC: 337 MG/DL (ref 200–475)
GAS FLOW.O2 O2 DELIVERY SYS: ABNORMAL L/MIN
GAS FLOW.O2 SETTING OXYMISER: 2 L/M
GLUCOSE BLD STRIP.AUTO-MCNC: 107 MG/DL (ref 65–100)
GLUCOSE BLD STRIP.AUTO-MCNC: 108 MG/DL (ref 65–100)
GLUCOSE BLD STRIP.AUTO-MCNC: 118 MG/DL (ref 65–100)
GLUCOSE BLD STRIP.AUTO-MCNC: 85 MG/DL (ref 65–100)
GLUCOSE SERPL-MCNC: 118 MG/DL (ref 65–100)
HCO3 BLD-SCNC: 41 MMOL/L (ref 22–26)
HCT VFR BLD AUTO: 43.4 % (ref 35–47)
HGB BLD-MCNC: 12.4 G/DL (ref 11.5–16)
MCH RBC QN AUTO: 30.7 PG (ref 26–34)
MCHC RBC AUTO-ENTMCNC: 28.6 G/DL (ref 30–36.5)
MCV RBC AUTO: 107.4 FL (ref 80–99)
NRBC # BLD: 0 K/UL (ref 0–0.01)
NRBC BLD-RTO: 0 PER 100 WBC
PCO2 BLD: 77.5 MMHG (ref 35–45)
PH BLD: 7.33 [PH] (ref 7.35–7.45)
PLATELET # BLD AUTO: 157 K/UL (ref 150–400)
PMV BLD AUTO: 12.4 FL (ref 8.9–12.9)
PO2 BLD: 62 MMHG (ref 80–100)
POTASSIUM SERPL-SCNC: 4.5 MMOL/L (ref 3.5–5.1)
RBC # BLD AUTO: 4.04 M/UL (ref 3.8–5.2)
SAO2 % BLD: 88 % (ref 92–97)
SERVICE CMNT-IMP: ABNORMAL
SERVICE CMNT-IMP: NORMAL
SODIUM SERPL-SCNC: 134 MMOL/L (ref 136–145)
SPECIMEN TYPE: ABNORMAL
TOTAL RESP. RATE, ITRR: 20
WBC # BLD AUTO: 8.8 K/UL (ref 3.6–11)

## 2020-08-03 PROCEDURE — 65660000000 HC RM CCU STEPDOWN

## 2020-08-03 PROCEDURE — 74011250637 HC RX REV CODE- 250/637: Performed by: INTERNAL MEDICINE

## 2020-08-03 PROCEDURE — 85379 FIBRIN DEGRADATION QUANT: CPT

## 2020-08-03 PROCEDURE — 97535 SELF CARE MNGMENT TRAINING: CPT

## 2020-08-03 PROCEDURE — 70496 CT ANGIOGRAPHY HEAD: CPT

## 2020-08-03 PROCEDURE — 74011000250 HC RX REV CODE- 250: Performed by: INTERNAL MEDICINE

## 2020-08-03 PROCEDURE — 70450 CT HEAD/BRAIN W/O DYE: CPT

## 2020-08-03 PROCEDURE — 71045 X-RAY EXAM CHEST 1 VIEW: CPT

## 2020-08-03 PROCEDURE — 82962 GLUCOSE BLOOD TEST: CPT

## 2020-08-03 PROCEDURE — 97530 THERAPEUTIC ACTIVITIES: CPT

## 2020-08-03 PROCEDURE — 74011250636 HC RX REV CODE- 250/636: Performed by: INTERNAL MEDICINE

## 2020-08-03 PROCEDURE — 85384 FIBRINOGEN ACTIVITY: CPT

## 2020-08-03 PROCEDURE — 82803 BLOOD GASES ANY COMBINATION: CPT

## 2020-08-03 PROCEDURE — 80048 BASIC METABOLIC PNL TOTAL CA: CPT

## 2020-08-03 PROCEDURE — 77010033678 HC OXYGEN DAILY

## 2020-08-03 PROCEDURE — 36600 WITHDRAWAL OF ARTERIAL BLOOD: CPT

## 2020-08-03 PROCEDURE — 85027 COMPLETE CBC AUTOMATED: CPT

## 2020-08-03 PROCEDURE — 74011636320 HC RX REV CODE- 636/320: Performed by: INTERNAL MEDICINE

## 2020-08-03 PROCEDURE — 94660 CPAP INITIATION&MGMT: CPT

## 2020-08-03 PROCEDURE — 36415 COLL VENOUS BLD VENIPUNCTURE: CPT

## 2020-08-03 RX ORDER — SODIUM CHLORIDE 0.9 % (FLUSH) 0.9 %
10 SYRINGE (ML) INJECTION
Status: COMPLETED | OUTPATIENT
Start: 2020-08-03 | End: 2020-08-03

## 2020-08-03 RX ADMIN — LEVOFLOXACIN 750 MG: 5 INJECTION, SOLUTION INTRAVENOUS at 08:43

## 2020-08-03 RX ADMIN — MICONAZOLE NITRATE: 20 CREAM TOPICAL at 17:08

## 2020-08-03 RX ADMIN — Medication 10 ML: at 05:09

## 2020-08-03 RX ADMIN — Medication 1 AMPULE: at 08:42

## 2020-08-03 RX ADMIN — DIPHENHYDRAMINE HYDROCHLORIDE, ZINC ACETATE: 2; .1 CREAM TOPICAL at 17:06

## 2020-08-03 RX ADMIN — ENOXAPARIN SODIUM 40 MG: 40 INJECTION, SOLUTION INTRAVENOUS; SUBCUTANEOUS at 14:10

## 2020-08-03 RX ADMIN — ATORVASTATIN CALCIUM 10 MG: 20 TABLET, FILM COATED ORAL at 08:42

## 2020-08-03 RX ADMIN — CLOTRIMAZOLE: 10 CREAM TOPICAL at 17:27

## 2020-08-03 RX ADMIN — Medication 10 ML: at 15:41

## 2020-08-03 RX ADMIN — ASPIRIN 81 MG CHEWABLE TABLET 81 MG: 81 TABLET CHEWABLE at 08:42

## 2020-08-03 RX ADMIN — CASTOR OIL AND BALSAM, PERU: 788; 87 OINTMENT TOPICAL at 05:09

## 2020-08-03 RX ADMIN — METOPROLOL TARTRATE 25 MG: 25 TABLET, FILM COATED ORAL at 17:31

## 2020-08-03 RX ADMIN — CLOTRIMAZOLE: 10 CREAM TOPICAL at 08:48

## 2020-08-03 RX ADMIN — IOPAMIDOL 100 ML: 755 INJECTION, SOLUTION INTRAVENOUS at 23:27

## 2020-08-03 RX ADMIN — Medication 10 ML: at 23:27

## 2020-08-03 RX ADMIN — ACETAZOLAMIDE SODIUM 500 MG: 500 INJECTION, POWDER, LYOPHILIZED, FOR SOLUTION INTRAVENOUS at 15:35

## 2020-08-03 RX ADMIN — DIPHENHYDRAMINE HYDROCHLORIDE, ZINC ACETATE: 2; .1 CREAM TOPICAL at 08:48

## 2020-08-03 RX ADMIN — MICONAZOLE NITRATE: 20 CREAM TOPICAL at 08:48

## 2020-08-03 RX ADMIN — METOPROLOL TARTRATE 25 MG: 25 TABLET, FILM COATED ORAL at 08:42

## 2020-08-03 RX ADMIN — CASTOR OIL AND BALSAM, PERU: 788; 87 OINTMENT TOPICAL at 17:05

## 2020-08-03 NOTE — PROGRESS NOTES
RAMO PLAN:  Home w/ vj & HH vs SNF when medically stable. Extubated on 7/30 - now using 2 lpm daytime/bipap at night. Need to watch for possible home 02 and/or NIV needed at dc. PT/OT attempting to eval on Fri - pt too drowsy so they will continue following. Pt lives w/ vj who does not work outside home. Pt was only transferring bed to chair pta. She has a RW, BSC,, cane, WC. Suspect SNF may be recommended unless vj feels she can handle 24/7 care w/ HH. Pt has Transfer orders to floor for routine progression of care. Will likely need transport at NC.   [id number] [facility] Porsche, MSW

## 2020-08-03 NOTE — PROGRESS NOTES
0700: Bedside shift change report received from Mary Sanchez, [address] [place] [address] (offgoing nurse). Report included the following information SBAR, Kardex, ED Summary, Procedure Summary, Intake/Output, MAR and Recent Results.     0800: Shift report completed, see flowsheets    1020: Transfer ordered received, new room assignment [id number]

## 2020-08-03 NOTE — PROGRESS NOTES
Pharmacy Automatic Renal Dosing Protocol - Antimicrobials    Indication for Antimicrobials: sepsis, CAP    Current Regimen of Each Antimicrobial:  Levofloxacin 750 mg IV q24h; started , day 3 (day 8 CAP tx)    Previous Antimicrobial Therapy:  Cefepime 2g IV q12h (Start Date -)  Vancomycin 1750 mg q18h; started -  Cefazolin 2gm IV q8h; started -; day 3 (day 6 of therapy)    Significant Cultures:   COVID negative x 2   resp panel - negative (final)   resp panel - MSSA, final   paired blood cx: NGTD, pending     Radiology / Imaging results: (X-ray, CT scan or MRI):    chest XR  IMPRESSION: Mild pulmonary edema which is improved. Small bilateral pleural  effusions slightly increased in the right with some improvement on the left. Paralysis, amputations, malnutrition: none noted    Labs:  Recent Labs     20  0441 20  0429   CREA 0.72 0.90   BUN 28* 35*   WBC 8.8 7.4     Temp (24hrs), Av.2 °F (36.8 °C), Min:97.4 °F (36.3 °C), Max:98.6 °F (37 °C)    Creatinine Clearance (mL/min) or Dialysis: 68.7 mL/min (IBW)     Impression/Plan:   Scr stable  Continue current dose of LVQ  Antimicrobial stop date      Pharmacy will follow daily and adjust medications as appropriate for renal function and/or serum levels.     Thank you,  HIRAL Boyce

## 2020-08-03 NOTE — PROGRESS NOTES
TRANSFER - IN REPORT:    Verbal report received from Lelo Strauss RN(name) on Josefina Cerrato  being received from CCU(unit) for routine progression of care      Report consisted of patients Situation, Background, Assessment and   Recommendations(SBAR). Information from the following report(s) SBAR, Kardex, ED Summary, Procedure Summary, Intake/Output, MAR, Accordion, Recent Results, Med Rec Status and Cardiac Rhythm NSR was reviewed with the receiving nurse. Opportunity for questions and clarification was provided. Assessment completed upon patients arrival to unit and care assumed.

## 2020-08-03 NOTE — PROGRESS NOTES
Problem: Self Care Deficits Care Plan (Adult)  Goal: *Acute Goals and Plan of Care (Insert Text)  Description:   FUNCTIONAL STATUS PRIOR TO ADMISSION: Mod I to Setup A with performing basic ADLs using SPC and leaning on furniture PRN. Sister sets up bucket for Pt to sponge bathe while seated. Mod I with all aspects of toileting. Mostly walks barefoot in her home and doesn't wear socks. Says she can don/doff underpants w/o assist. Does not drive. Admits to x1 fall in past year. HOME SUPPORT: Lives with daughter and sister who provide Setup A with some ADLs and manage all IADLs. Occupational Therapy Goals    Initiated 8/3/2020 for 7 Day Weekly Re-Assessment  1. Patient will perform grooming task seated with Setup Assist within 7 day(s). 2.  Patient will perform upper body bathing with Mod A within 7 day(s). 3.  Patient will perform upper body dressing with Mod A within 7 days. 4.  Patient will perform lower body dressing with Mod A within 7 day(s). 5.  Patient will perform BSC transfers with Min A using least restrictive device and 3 or fewer verbal cues within 7 day(s). 6.  Patient will perform front kimmy care with Min A for thoroughness within 7 day(s). Initiated 7/27/2020  1. Patient will perform sponge bathing seated with Setup Assist within 7 day(s). Not met; Modified  2. Patient will perform upper body dressing with independence within 7 day(s). Not met; Modified  3. Patient will perform lower body dressing with Min A within 7 day(s). Not met; Modified  4. Patient will perform toilet transfers with CGA using least restrictive device within 7 day(s). Not met; Modified  5. Patient will perform all aspects of toileting with CGA within 7 day(s). Not met;  Modified      Outcome: Not Progressing Towards Goal   OCCUPATIONAL THERAPY TREATMENT/WEEKLY RE-EVALUATION  Patient: Jeremías Godwin (86 y.o. female)  Date: 8/3/2020  Diagnosis: COVID-19 ruled out [Z03.818]   <principal problem not specified>       Precautions:    Chart, occupational therapy assessment, plan of care, and goals were reviewed. ASSESSMENT  Based on the objective data described below, Pt is not making progress toward the above listed OT goals. Pt exhibits increased deficits with impaired processing, decreased initiation, and significantly decreased functional mobility this date, now performing ADLs at a Mod to Total Assist level. Pt was received supine on 1 L/min O2 via NC on the phone, yet no one was on the other line. Pt exhibited flat affect and was minimally verbal throughout session, appearing childlike at times stating \"Don't want to\" and shaking head side<>side indicating she did not want to attempt mobilization. She washed her face sitting EOB with multiple verbal/visual/tactile cues for initiation. After several attempts to stand and readjust linens, Pt was returned supine with Total Assist x2. Pt will need SNF rehab at D/C. Pt would benefit from pressure relieving mattress d/t body habitus, decreased functional mobility, and impaired cognition. RN made aware. Current Level of Function Impacting Discharge (ADLs): Mod to Total Assist with ADLs; Total Assist x2 with bed mobility d/t impaired processing    Other factors to consider for discharge: Fall risk; Currently requires Total Assist x2 for bed mobility          PLAN :  Goals have been updated based on progression since last assessment. Patient continues to benefit from skilled intervention to address the above impairments. Continue to follow patient 4 times a week to address goals. Recommend with staff: Pt in need of pressure relieving mattress (RN made aware); Regular position changes to minimize risk for skin breakdown.      Recommend next OT session: Grooming task seated EOB targeting improved processing/initiation; C transfer trial    Recommendation for discharge: (in order for the patient to meet his/her long term goals)  Therapy up to 5 days/week in SNF setting    This discharge recommendation:  Has been made in collaboration with the attending provider and/or case management    Equipment recommendations for successful discharge (if) home: TBD pending further progress       SUBJECTIVE:   Patient stated I'm on the phone\" (no one on line, dial tone present) and \"Don't want to\" (re: attempt to stand at [address] [place] [address])    OBJECTIVE DATA SUMMARY:   Cognitive/Behavioral Status:  Neurologic State: Alert;Confused  Orientation Level: Oriented to person;Oriented to place; Disoriented to situation;Disoriented to time  Cognition: Impaired decision making;Decreased attention/concentration;Decreased command following     Perseveration: No perseveration noted  Safety/Judgement: Fall prevention    Functional Mobility and Transfers for ADLs:  Bed Mobility:  Rolling: Assist x2;Total assistance  Supine to Sit: Total assistance;Assist x2  Sit to Supine: Total assistance;Assist x2  Scooting: Total assistance;Assist x2    Transfers:  Sit to Stand: (did not occur)     Balance:  Sitting: Intact  Sitting - Static: Good (unsupported)  Sitting - Dynamic: Good (unsupported)  Standing: (pt refused and unable due to poor processing)    ADL Intervention:       Grooming  Position Performed: Seated edge of bed  Washing Face: Minimum assistance(sitting EOB; x3 verbal/visual cues for task initiation)  Lower Body Dressing Assistance  Socks: Total assistance (dependent)  Position Performed: Supine    Cognitive Retraining  Safety/Judgement: Fall prevention    Pain:  Pt with ongoing wincing yet did not verbalize or point to location of pain despite multiple attempts by therapist/RN. Activity Tolerance:   Poor  Please refer to the flowsheet for vital signs taken during this treatment.     After treatment patient left in no apparent distress:   Supine in bed, Patient positioned in L sidelying for pressure relief, Call bell within reach, and Side rails x 3    COMMUNICATION/COLLABORATION:   The patients plan of care was discussed with: Physical Therapist, Registered Nurse, and Patient    CHARLES Hoang/L  Time Calculation: 37 mins

## 2020-08-03 NOTE — PROGRESS NOTES
PULMONARY/Critical Care/SLEEP MEDICINE  [facility]  Name: Sumanth Jaime   : 1960   MRN: [id number]   Date: 8/3/2020 9:33 AM       []      The patient is on      []      Mechanical ventilation []      Pressors   [x]      BiPAP []         Problems      Assessment:    1. Acute respiratory failure with hypercarbia, hypoxia-   2. Afib with RVR now on Lopressor  3. Staph bronchitis vs early bronchopneumonia  4. CHF ? Systolic/diastolic? Good diuresis  5. Hypotension ? Sedation related  6. Ventilator dependence  7. obesity  8. High suspicion JOSLYN  9. Hypercapnea  10. H/o tobacco   11. Asthma hx         Plan:    BiPAP to continue at night, on NC oxygen at present. Wean oxygen as tolerated  Diuretics as tolerated  Prn Diuretics as tolerated  bronchial hygine  Empiric abx  DVT ppx  Pt/ OT  Outpatient PFT  Outpatient sleep study        No acute events overnight  No acute distress  On bipap at night only             Vital Signs:    Visit Vitals  /72   Pulse 72   Temp 98.3 °F (36.8 °C)   Resp 20   Ht 5' 3\" (1.6 m)   Wt 147.1 kg (324 lb 4.8 oz)   SpO2 99%   BMI 57.45 kg/m²       O2 Device: BIPAP   O2 Flow Rate (L/min): 2 l/min   Temp (24hrs), Av.3 °F (36.8 °C), Min:98.2 °F (36.8 °C), Max:98.6 °F (37 °C)       Intake/Output:   Last shift:      No intake/output data recorded.   Last 3 shifts:  1901 -  0700  In: 1390 [P.O.:340; I.V.:1050]  Out: 1580 [Urine:1580]    Intake/Output Summary (Last 24 hours) at 8/3/2020 0806  Last data filed at 8/3/2020 0700  Gross per 24 hour   Intake 915 ml   Output 1040 ml   Net -125 ml     Hemodynamics:   PAP:     Wedge:     CVP:  CVP (mmHg): 15 mmHg (20 1400)   CO:     CI:     SVR:     PVR:          Physical Exam:    General: []      Intubated/sedated [x]      No acute distress []          []      Ill appearing []       []            HEENT: []     ETT [x]      PERRL []     NGT     []      Anicteric Mucosa:[]      moist   []      dry [] Resp: []      Wheeze [x]      Clear to ascultation bilaterally []      Accessory muscle use    []      Rales []      Chest tube:  []      Air leak []          []      Ronchi []      Crepitus []         []      Coarse bilateral ventilator breath sounds      CV: [x]      Regular []      Tachycardia []          []      Irregular []      Bradycardic []          []      Murmur []      S1 []          []     RUB []    S2 []            GI: [x]      Soft  []      NG Tube Bowel sounds: []      present []      absent    []      Firm []      PEG []    Tender      []      Distended  []            : []      Petersen []      Hematuria []          [x]      Clear urine []       []            MSK:    []      SCDs []      Edema []   clubbing       [x]      No deformities []     Cyanosis  []            Skin: []      Warm [x]      Dry  []   Mottled       []      Cool []      Moist []          []     Lesions []      Diaphoretic []          []      Rash  []      Cyanosis []            N-psych: []      Sedated  []      Agitated []     Moves all extremities     [x]      Alert  Follows commands:   [x]      Yes  []      No []     oriented       Devices: []      PA Catheter []      Tracheostomy []          [x]      Central Line []        []        Chest tube:  Air leak? []        Y/[]        N    []          []      PICC []       []            DATA:   Current Facility-Administered Medications   Medication Dose Route Frequency    diphenhydrAMINE (BENADRYL) capsule 25 mg  25 mg Oral Q6H PRN    diphenhydrAMINE-zinc acetate 2%-0.1% (BENADRYL) cream   Topical TID    enoxaparin (LOVENOX) injection 40 mg  40 mg SubCUTAneous Q12H    levoFLOXacin (LEVAQUIN) 750 mg in D5W IVPB  750 mg IntraVENous Q24H    insulin lispro (HUMALOG) injection   SubCUTAneous AC&HS    dextrose 5 % - 0.45% NaCl infusion  25 mL/hr IntraVENous CONTINUOUS    clotrimazole (LOTRIMIN) 1 % cream   Topical BID    miconazole (SECURA) 2 % extra thick cream   Topical BID  phenol throat spray (CHLORASEPTIC) 1 Spray  1 Spray Oral PRN    balsam peru-castor oiL (VENELEX) ointment   Topical Q12H    alcohol 62% (NOZIN) nasal  1 Ampule  1 Ampule Topical Q12H    glucose chewable tablet 16 g  4 Tab Oral PRN    dextrose (D50W) injection syrg 12.5-25 g  12.5-25 g IntraVENous PRN    glucagon (GLUCAGEN) injection 1 mg  1 mg IntraMUSCular PRN    sodium chloride (NS) flush 5-40 mL  5-40 mL IntraVENous Q8H    sodium chloride (NS) flush 5-40 mL  5-40 mL IntraVENous PRN    acetaminophen (TYLENOL) tablet 650 mg  650 mg Oral Q6H PRN    Or    acetaminophen (TYLENOL) suppository 650 mg  650 mg Rectal Q6H PRN    polyethylene glycol (MIRALAX) packet 17 g  17 g Oral DAILY PRN    ondansetron (ZOFRAN) injection 4 mg  4 mg IntraVENous Q6H PRN    aspirin chewable tablet 81 mg  81 mg Oral DAILY    metoprolol tartrate (LOPRESSOR) tablet 25 mg  25 mg Oral BID    atorvastatin (LIPITOR) tablet 10 mg  10 mg Oral DAILY    hydrALAZINE (APRESOLINE) 20 mg/mL injection 20 mg  20 mg IntraVENous Q6H PRN       Telemetry: []        Sinus []        A-flutter []        Paced    []        A-fib []        Multiple PVCs                  Labs:  Recent Labs     08/03/20  0441 08/02/20  0429   WBC 8.8 7.4   HGB 12.4 12.6   HCT 43.4 42.9    152     Recent Labs     08/03/20  0441 08/02/20  0429   * 134*   K 4.5 4.9   CL 96* 96*   CO2 42* 39*   * 159*   BUN 28* 35*   CREA 0.72 0.90   CA 9.1 9.4   MG  --  2.6*   PHOS  --  1.6*   ALB  --  2.7*   TBILI  --  0.6   ALT  --  16     No results for input(s): PH, PCO2, PO2, HCO3, FIO2 in the last 72 hours.     Imaging:  []      I have personally reviewed the patients radiographs     []      No change from prior, tubes and lines in adequate position  []      Improved   []      Worsening     IMPRESSION:     The patient is: [x]       acutely ill Risk of deterioration: []       moderate    []       critically ill  []       high           [facility] German Riedel, MD

## 2020-08-03 NOTE — PROGRESS NOTES
OT reported concern about increase in pt AMS and is slower to respond to commands compared to yesterdays encounter. OT recommended ordering a specialty bed. [phone] seven served Dr. Louise Jackson for abg order on pt r/t their continuing confusion and slower responses to commands, pt alert to self and birthday. [address] w/ respiratory after abg collection with no critical results to report. Will continue to monitor pt and their AMS.      1630 Ordered [facility] bed confirmation [id number]

## 2020-08-03 NOTE — PROGRESS NOTES
MEWS 3 d/t increased RR. Patient transferred from CCU, BiPap at night - will continue to monitor closely. Will discuss with MD, charge nurse aware. 1400  Paged Dr. Eugenio Vazquez to discuss patients RR and LOC. Patient is A&O x 2 (name, , place - not situation, year,or president)  Patient has delayed responses and will stare off and not answer. Nurse asked patient if she understood what was being said but felt like she was unable to answer, she shook her head yes. RR ranging from 32-34 per min with shallow breaths. O2 saturations 94-97% on 2 L NC. Dr. Eugenio Vazquez instructed nurse to administer diamox and order ABG's and she would come and assess. Dr. Eugenio Vazquez assessed patient and discontinued ABG's d/t patient condition based on her assessment. Instructed to utilize PRN bipap if needed and adjust O2 as needed.

## 2020-08-03 NOTE — PROGRESS NOTES
Comprehensive Nutrition Assessment    Type and Reason for Visit: Reassess    Nutrition Recommendations/Plan:   Continue Diabetic diet as tolerated  Please give prn miralax, no BM since PTA    Nutrition Assessment:   Chart reviewed, case discussed during CCU rounds. Pt off the floor at time of attempted visit. Plans to move out of CCU today. She was advanced to solid diet last week. Appetite fairly good per RN flowsheet's. Labs reviewed, phos low yesterday. Likely needs to be rechecked. Noted still no BM, miralax prn available but not given yet. Patient Vitals for the past 72 hrs:   % Diet Eaten   08/02/20 1330 75 %   08/02/20 0900 50 %   08/01/20 0900 90 %        Malnutrition Assessment:  Malnutrition Status:     Not likely. Estimated Daily Nutrient Needs:  Energy (kcal):  1780 (MSJ 1900 x 1.2 -500)  Protein (g):   (1.5-2gPro/kg IBW)       Fluid (ml/day):  1500    Nutrition Related Findings:  Meds: humalog, levaquin, D5, Gera[employer] by Brooke, prn miralax. Edema: +2-generalized. BM PTA. Wounds:    None       Current Nutrition Therapies:   DIET DIABETIC CONSISTENT CARB Regular    Anthropometric Measures:  · Height:  5' 3\" (160 cm)  · Current Body Wt:  147.1 kg (324 lb 4.8 oz)    · Ideal Body Wt:   :  260.9 %   · BMI Category:  Obese class 3 (BMI 40.0 or greater)       Nutrition Diagnosis:   · Inadequate protein-energy intake related to other (specify)(intubation) as evidenced by NPO or clear liquid status due to medical condition  Previous dx resolved. Nutrition Interventions:   Food and/or Nutrient Delivery: Continue current diet  Nutrition Education and Counseling: No recommendations at this time  Coordination of Nutrition Care: No recommendation at this time    Goals:  Pt will consume >75% of meals with a BM in 3-5 days.        Nutrition Monitoring and Evaluation:   Food/Nutrient Intake Outcomes: Diet advancement/tolerance  Physical Signs/Symptoms Outcomes: Biochemical data, Fluid status or edema, Weight, GI status    Discharge Planning:    Continue current diet     Electronically signed by Victorino Engel RD, [address]  on 8/3/2020 at 1:57 PM    Contact: [phone]

## 2020-08-03 NOTE — PROGRESS NOTES
Problem: Mobility Impaired (Adult and Pediatric)  Goal: *Acute Goals and Plan of Care (Insert Text)  Description: FUNCTIONAL STATUS PRIOR TO ADMISSION: Patient was modified independent using a single point cane for functional mobility. Patient reports she sponge bathes. Denies any falls. HOME SUPPORT PRIOR TO ADMISSION: The patient lived with her daughter and family but did not require assist.     Physical Therapy Goals  Initiated 7/27/2020  1. Patient will move from supine to sit and sit to supine , scoot up and down, and roll side to side in bed with modified independence within 7 day(s). 2.  Patient will transfer from bed to chair and chair to bed with minimal assistance/contact guard assist using the least restrictive device within 7 day(s). 3.  Patient will perform sit to stand with minimal assistance/contact guard assist within 7 day(s). 4.  Patient will ambulate with moderate assistance  for 10 feet with the least restrictive device within 7 day(s). Revised 8/3/2020  1. Patient will move from supine to sit and sit to supine , scoot up and down, and roll side to side in bed with modA within 7 day(s). 2.  Patient will transfer from bed to chair and chair to bed with maxA using the least restrictive device within 7 day(s). 3.  Patient will perform sit to stand with maxA within 7 day(s). 4.  Patient will ambulate with maximum assistance  for 10 feet with the least restrictive device within 7 day(s). Outcome: Not Progressing Towards Goal  PHYSICAL THERAPY TREATMENT: WEEKLY REASSESSMENT  Patient: Essence Alvarez (54 y.o. female)  Date: 8/3/2020  Primary Diagnosis: [id number] ruled out [Z03.818]        Precautions: Fall         ASSESSMENT  Patient continues with skilled PT services and is not progressing towards goals. Pt was agreeable to PT services and received in supine on 1 L O2 in NAD. Pt presents with decreased strength, processing speed, and initiation of movement.  Transitioned supine > sit maxA x2 and c/o unspecified pain. Pt sat unsupported CGA on EOB for 20 min but refused to transition sit > stand w RW despite extensive verbal cueing. Pt was unable to initiate scooting on EOB or transfer sit > supine without extensive coaxing. Eventually Pt transferred sit > supine x2 and requested nurse to help with hand placement and cueing. Pt scooted to [facility] total assistance x4 with bed placed in Trendelenburg. While in Trendelenburg, noted discoloration of patient's face and O2 saturation decreased to 88% which required additional repositioning to recover back to 92%. Patient will continue to benefit from skilled therapy to improve activity tolerance and independence. Patient's progression toward goals since last assessment: No improvement noted and goals were adjusted as listed above. Current Level of Function Impacting Discharge (mobility/balance): Max/total assist    Functional Outcome Measure: The patient scored 5/100 on the Barthel outcome measure which is indicative of 95% impairment of functional status. Other factors to consider for discharge: Possible CO2 retention? PLAN :  Goals have been updated based on progression since last assessment. Patient continues to benefit from skilled intervention to address the above impairments. Recommendations and Planned Interventions: bed mobility training, transfer training, gait training, therapeutic exercises, patient and family training/education, and therapeutic activities      Frequency/Duration: Patient will be followed by physical therapy:  4 times a week to address goals.     Recommendation for discharge: (in order for the patient to meet his/her long term goals)  Therapy up to 5 days/week in SNF setting    This discharge recommendation:  Has not yet been discussed the attending provider and/or case management    IF patient discharges home will need the following DME: to be determined (TBD)         SUBJECTIVE:   Patient stated I'm talking to my daughter (no one on the other line).     OBJECTIVE DATA SUMMARY:   HISTORY:    Past Medical History:   Diagnosis Date    Diabetes ([facility] [place] [address]     Hypertension     Ill-defined condition     high cholesterol    Ill-defined condition     tachycardia    Other and unspecified symptoms and signs involving general sensations and perceptions     ruptured disc   No past surgical history on file. Personal factors and/or comorbidities impacting plan of care: HTN, DM, High cholesterol     Home Situation  Home Environment: Private residence  # Steps to Enter: 4  Rails to Enter: Yes  Hand Rails : Bilateral  One/Two Story Residence: One story  Living Alone: No  Support Systems: Child(dejuan), Family member(s)  Patient Expects to be Discharged to[de-identified] Unknown  Current DME Used/Available at Home: Cane, straight, Walker, rolling  Tub or Shower Type: Tub/Shower combination(sponge bathes )    EXAMINATION/PRESENTATION/DECISION MAKING:   Critical Behavior:  Neurologic State: Confused, Eyes open spontaneously  Orientation Level: Oriented to person, Disoriented to place, Disoriented to situation, Disoriented to time  Cognition: Follows commands     Hearing: Auditory  Auditory Impairment: None  Skin:  Skin breakdown under pannus and rash across back   Edema: Mild in feet   Range Of Motion:                          Strength: Tone & Sensation:                                  Coordination:     Vision:      Functional Mobility:  Bed Mobility:  Rolling: Assist x2;Total assistance  Supine to Sit: Total assistance;Assist x2  Sit to Supine: Total assistance;Assist x2  Scooting:  Total assistance;Assist x2  Transfers:                             Balance:   Sitting: Intact  Sitting - Static: Good (unsupported)  Sitting - Dynamic: Good (unsupported)  Standing: (pt refused and unable due to poor processing)        Functional Measure:  Barthel Index:    Bathin  Bladder: 0  Bowels: 0  Groomin  Dressing: 0  Feedin  Mobility: 0  Stairs: 0  Toilet Use: 0  Transfer (Bed to Chair and Back): 0  Total: 5/100       The Barthel ADL Index: Guidelines  1. The index should be used as a record of what a patient does, not as a record of what a patient could do. 2. The main aim is to establish degree of independence from any help, physical or verbal, however minor and for whatever reason. 3. The need for supervision renders the patient not independent. 4. A patient's performance should be established using the best available evidence. Asking the patient, friends/relatives and nurses are the usual sources, but direct observation and common sense are also important. However direct testing is not needed. 5. Usually the patient's performance over the preceding 24-48 hours is important, but occasionally longer periods will be relevant. 6. Middle categories imply that the patient supplies over 50 per cent of the effort. 7. Use of aids to be independent is allowed. Suzie Landis., Barthel, D.W. (7734). Functional evaluation: the Barthel Index. [facility] (14)2. Mark Culver maurice MITCH Pruett, Jailene Hernadez., [facility] [place], [address] [place] [address] (). Measuring the change indisability after inpatient rehabilitation; comparison of the responsiveness of the Barthel Index and Functional Garita Measure. Journal of Neurology, Neurosurgery, and Psychiatry, 66(4), 738-760. ROMÁN Jensen, JAME Figueroa, & Zac Emerson, M.A. (2004.) Assessment of post-stroke quality of life in cost-effectiveness studies: The usefulness of the Barthel Index and the EuroQoL-5D. Quality of Life Research, 13, 427-43          Pain Rating:  Pt grimaced frequently but could not verbalize where pain was located. Activity Tolerance:   Poor  Please refer to the flowsheet for vital signs taken during this treatment.     After treatment patient left in no apparent distress:   Supine in bed, Call bell within reach, and Side rails x 3    COMMUNICATION/EDUCATION:   The patients plan of care was discussed with: Occupational therapist. And RN    Patient is unable to participate in goal setting and plan of care.     Thank you for this referral.  Carole Cason, PT, DPT   Time Calculation: 38 mins

## 2020-08-03 NOTE — PROGRESS NOTES
Hospitalist Progress Note    NAME: Doyle Forte   :  1960   MRN:  [id number]     Admit date: 2020    Today's date: 20    PCP: MOISE Rodriguez M.D. Cell [phone]    Assessment / Plan:  Acute respiratory failure with hypoxia and hypercarbia POA s/p intubation   Probable acute on chronic diastolic CHF POA LVEF 87-95%  ? Acute bronchitis POA   Underlying JOSLYN or OHS POA with Body mass index is 58.58 kg/m². Metabolic encephalopathy POA due to hypercarbia   COVID-19 rule out POA testing negative x 2  metabolic alkalosis , maybe contraction alkalosis   Baseline does not wear home o2, admits to chronic CHOPRA  Past few days worsening CHOPRA and SOB at rest  Positive LE edema and orthopnea  No fevers, positive dry cough  HCO3 elevated at admit 36, suspect some chronic CO2 retention at baseline  CXR with bilateral interstitial edema   pBNP 2,987  procalcitonin < 0.05  Differential; Acute left CHF, atypical pneumonia    extubated   No clear pneumonia, pulm added antibiotics, sputum culture showed MSSA   Will DC vanco and s/p cefipime , switched to cefazolin . Cefazolin DC as pt was noticed to have urticaria .switched to lvq . Pt had gotten 6days of IV abx so far , will  Continue one more day of levaquin and stop  S/p continuous BIPap, now on NC , prn BIPAP and  at bedtime   Prn lasix . Repeat chest xray   Transferred out of icu    a dose of diamox ordered   Pt found confused , although awake   Will get abg stat: repeat ABG showed PH 7.33 PCO2 77 bicarb 41     Urticarial rash , allergic reaction?   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      DVT prophylaxis with lovenox     Code status: full code  NOK: daughter     Subjective:     Chief Complaint / Reason for Physician Visit f/u acute respiratory failure  FU CHF/pneumonia  ,sharon a little bit SOB     Review of Systems:  Symptom Y/N Comments  Symptom Y/N Comments   Fever/Chills n   Chest Pain n    Poor Appetite    Edema     Cough n   Abdominal Pain n    Sputum    Joint Pain     SOB/CHOPRA y   Headache     Nausea/vomit n   Tolerating PT/OT     Diarrhea    Tolerating Diet y    Constipation    Other       Could NOT obtain due to:      Objective:     VITALS:   Last 24hrs VS reviewed since prior progress note.  Most recent are:  Patient Vitals for the past 24 hrs:   Temp Pulse Resp BP SpO2   08/03/20 1238 98 °F (36.7 °C) 76 (!) 32 117/63 95 %   08/03/20 0800 97.4 °F (36.3 °C) 74 20 130/67 91 %   08/03/20 0700  72  151/72 99 %   08/03/20 0600  73  128/65 100 %   08/03/20 0500  71  125/65 100 %   08/03/20 0400 98.3 °F (36.8 °C) 73 20 131/66 100 %   08/03/20 0340     100 %   08/03/20 0300  72  137/68 98 %   08/03/20 0200  72  128/64 98 %   08/03/20 0100  72  123/66 99 %   08/03/20 0000  74 18 152/76 99 %   08/02/20 2345 98.3 °F (36.8 °C)       08/02/20 2300  74  150/90 98 %   08/02/20 2218     90 %   08/02/20 2200  77 20 130/62 95 %   08/02/20 2100  76  125/65 92 %   08/02/20 2000 98.2 °F (36.8 °C) 74 20 121/61 97 %   08/02/20 1944     100 %   08/02/20 1900  73 20 122/64 93 %   08/02/20 1800  78 20 135/63 99 %   08/02/20 1757  78  131/78  08/02/20 1700  78 22 117/64 91 %   08/02/20 1600 98.2 °F (36.8 °C) 77 22 98/63 98 %   08/02/20 1500  77 20 96/63 100 %       Intake/Output Summary (Last 24 hours) at 8/3/2020 1406  Last data filed at 8/3/2020 0800  Gross per 24 hour   Intake 450 ml   Output 855 ml   Net -405 ml        Wt Readings from Last 12 Encounters:   08/02/20 147.1 kg (324 lb 4.8 oz)   12/27/19 136.1 kg (300 lb)   01/30/19 136.1 kg (300 lb)   11/13/15 134.7 kg (297 lb)   03/17/15 140.1 kg (308 lb 13.8 oz)       PHYSICAL EXAM:  General: WD, WN.extubated , alert, on nasal canula   EENT:  PERRL. Anicteric sclerae. ETT in place  Resp:  Crackles at bases bilaterally, no wheezing. No accessory muscle use  CV:  Regular  rhythm,  1+ edema  GI:  Soft, Non distended, Non tender. +Bowel sounds, no rebound  LN:  No cervical or inguinal MONICA  Neurologic:  AAOx4 , no focal deficit   Psych:    Not anxious nor agitated  Skin:  Rashes +++    Reviewed most current lab test results and cultures  YES  Reviewed most current radiology test results   YES  Review and summation of old records today    NO  Reviewed patient's current orders and MAR    YES  PMH/ reviewed - no change compared to H&P  ________________________________________________________________________  Care Plan discussed with:    Comments   Patient x    Family      RN x    Care Manager     Consultant                        Multidiciplinary team rounds were held today with , nursing, pharmacist and clinical coordinator. Patient's plan of care was discussed; medications were reviewed and discharge planning was addressed.      ________________________________________________________________________      Comments   >50% of visit spent in counseling and coordination of care x    ________________________________________________________________________  Dorina Signs, MD     Procedures: see electronic medical records for all procedures/Xrays and details which were not copied into this note but were reviewed prior to creation of Plan. LABS:  I reviewed today's most current labs and imaging studies.   Pertinent labs include:  Recent Labs     08/03/20 0441 08/02/20 0429   WBC 8.8 7.4   HGB 12.4 12.6   HCT 43.4 42.9    152     Recent Labs     08/03/20 0441 08/02/20 0429   * 134*   K 4.5 4.9   CL 96* 96*   CO2 42* 39*   * 159*   BUN 28* 35*   CREA 0.72 0.90   CA 9.1 9.4   MG  --  2.6*   PHOS  --  1.6*   ALB  --  2.7*   TBILI  --  0.6   ALT  --  16

## 2020-08-03 NOTE — PROGRESS NOTES
Right IJ central line removed with patient in bed. Pressure held for 5 minutes without incidence. Sterile dressing applied. Patient tolerate well.

## 2020-08-04 LAB
ANION GAP SERPL CALC-SCNC: ABNORMAL MMOL/L (ref 5–15)
BUN SERPL-MCNC: 23 MG/DL (ref 6–20)
BUN/CREAT SERPL: 34 (ref 12–20)
CALCIUM SERPL-MCNC: 8.8 MG/DL (ref 8.5–10.1)
CHLORIDE SERPL-SCNC: 99 MMOL/L (ref 97–108)
CO2 SERPL-SCNC: 40 MMOL/L (ref 21–32)
CREAT SERPL-MCNC: 0.67 MG/DL (ref 0.55–1.02)
D DIMER PPP FEU-MCNC: 1.04 MG/L FEU (ref 0–0.65)
ERYTHROCYTE [DISTWIDTH] IN BLOOD BY AUTOMATED COUNT: 14 % (ref 11.5–14.5)
FIBRINOGEN PPP-MCNC: 310 MG/DL (ref 200–475)
GLUCOSE BLD STRIP.AUTO-MCNC: 122 MG/DL (ref 65–100)
GLUCOSE BLD STRIP.AUTO-MCNC: 129 MG/DL (ref 65–100)
GLUCOSE BLD STRIP.AUTO-MCNC: 136 MG/DL (ref 65–100)
GLUCOSE BLD STRIP.AUTO-MCNC: 91 MG/DL (ref 65–100)
GLUCOSE SERPL-MCNC: 107 MG/DL (ref 65–100)
HCT VFR BLD AUTO: 43.1 % (ref 35–47)
HGB BLD-MCNC: 12.6 G/DL (ref 11.5–16)
MCH RBC QN AUTO: 31.3 PG (ref 26–34)
MCHC RBC AUTO-ENTMCNC: 29.2 G/DL (ref 30–36.5)
MCV RBC AUTO: 107.2 FL (ref 80–99)
NRBC # BLD: 0 K/UL (ref 0–0.01)
NRBC BLD-RTO: 0 PER 100 WBC
PLATELET # BLD AUTO: 141 K/UL (ref 150–400)
PMV BLD AUTO: 12.2 FL (ref 8.9–12.9)
POTASSIUM SERPL-SCNC: 4.4 MMOL/L (ref 3.5–5.1)
RBC # BLD AUTO: 4.02 M/UL (ref 3.8–5.2)
SERVICE CMNT-IMP: ABNORMAL
SERVICE CMNT-IMP: NORMAL
SODIUM SERPL-SCNC: 137 MMOL/L (ref 136–145)
WBC # BLD AUTO: 7.5 K/UL (ref 3.6–11)

## 2020-08-04 PROCEDURE — 85384 FIBRINOGEN ACTIVITY: CPT

## 2020-08-04 PROCEDURE — 74011250636 HC RX REV CODE- 250/636: Performed by: INTERNAL MEDICINE

## 2020-08-04 PROCEDURE — 36415 COLL VENOUS BLD VENIPUNCTURE: CPT

## 2020-08-04 PROCEDURE — 74011250637 HC RX REV CODE- 250/637: Performed by: INTERNAL MEDICINE

## 2020-08-04 PROCEDURE — 97535 SELF CARE MNGMENT TRAINING: CPT | Performed by: OCCUPATIONAL THERAPIST

## 2020-08-04 PROCEDURE — 85027 COMPLETE CBC AUTOMATED: CPT

## 2020-08-04 PROCEDURE — 94660 CPAP INITIATION&MGMT: CPT

## 2020-08-04 PROCEDURE — 97530 THERAPEUTIC ACTIVITIES: CPT

## 2020-08-04 PROCEDURE — 82962 GLUCOSE BLOOD TEST: CPT

## 2020-08-04 PROCEDURE — 97530 THERAPEUTIC ACTIVITIES: CPT | Performed by: OCCUPATIONAL THERAPIST

## 2020-08-04 PROCEDURE — 97110 THERAPEUTIC EXERCISES: CPT

## 2020-08-04 PROCEDURE — 80048 BASIC METABOLIC PNL TOTAL CA: CPT

## 2020-08-04 PROCEDURE — 77010033678 HC OXYGEN DAILY

## 2020-08-04 PROCEDURE — 65660000000 HC RM CCU STEPDOWN

## 2020-08-04 PROCEDURE — 85379 FIBRIN DEGRADATION QUANT: CPT

## 2020-08-04 RX ORDER — FUROSEMIDE 40 MG/1
40 TABLET ORAL DAILY
Status: DISCONTINUED | OUTPATIENT
Start: 2020-08-05 | End: 2020-08-07 | Stop reason: HOSPADM

## 2020-08-04 RX ADMIN — Medication 10 ML: at 00:58

## 2020-08-04 RX ADMIN — Medication 10 ML: at 13:51

## 2020-08-04 RX ADMIN — Medication 1 AMPULE: at 21:00

## 2020-08-04 RX ADMIN — Medication 1 AMPULE: at 00:59

## 2020-08-04 RX ADMIN — Medication 10 ML: at 05:50

## 2020-08-04 RX ADMIN — ENOXAPARIN SODIUM 40 MG: 40 INJECTION, SOLUTION INTRAVENOUS; SUBCUTANEOUS at 00:57

## 2020-08-04 RX ADMIN — MICONAZOLE NITRATE: 20 CREAM TOPICAL at 17:27

## 2020-08-04 RX ADMIN — DIPHENHYDRAMINE HYDROCHLORIDE 25 MG: 25 CAPSULE ORAL at 21:08

## 2020-08-04 RX ADMIN — ATORVASTATIN CALCIUM 10 MG: 20 TABLET, FILM COATED ORAL at 08:57

## 2020-08-04 RX ADMIN — METOPROLOL TARTRATE 25 MG: 25 TABLET, FILM COATED ORAL at 17:24

## 2020-08-04 RX ADMIN — DIPHENHYDRAMINE HYDROCHLORIDE, ZINC ACETATE: 2; .1 CREAM TOPICAL at 01:00

## 2020-08-04 RX ADMIN — ACETAMINOPHEN 650 MG: 325 TABLET ORAL at 17:30

## 2020-08-04 RX ADMIN — CLOTRIMAZOLE: 10 CREAM TOPICAL at 17:28

## 2020-08-04 RX ADMIN — METOPROLOL TARTRATE 25 MG: 25 TABLET, FILM COATED ORAL at 08:57

## 2020-08-04 RX ADMIN — CASTOR OIL AND BALSAM, PERU: 788; 87 OINTMENT TOPICAL at 05:51

## 2020-08-04 RX ADMIN — Medication 10 ML: at 21:02

## 2020-08-04 RX ADMIN — ASPIRIN 81 MG CHEWABLE TABLET 81 MG: 81 TABLET CHEWABLE at 08:57

## 2020-08-04 RX ADMIN — CASTOR OIL AND BALSAM, PERU: 788; 87 OINTMENT TOPICAL at 17:26

## 2020-08-04 RX ADMIN — ENOXAPARIN SODIUM 40 MG: 40 INJECTION, SOLUTION INTRAVENOUS; SUBCUTANEOUS at 12:32

## 2020-08-04 NOTE — PROGRESS NOTES
Problem: Self Care Deficits Care Plan (Adult)  Goal: *Acute Goals and Plan of Care (Insert Text)  Description:   FUNCTIONAL STATUS PRIOR TO ADMISSION: Mod I to Setup A with performing basic ADLs using SPC and leaning on furniture PRN. Sister sets up bucket for Pt to sponge bathe while seated. Mod I with all aspects of toileting. Mostly walks barefoot in her home and doesn't wear socks. Says she can don/doff underpants w/o assist. Does not drive. Admits to x1 fall in past year. HOME SUPPORT: Lives with daughter and sister who provide Setup A with some ADLs and manage all IADLs. Occupational Therapy Goals    Initiated 8/3/2020 for 7 Day Weekly Re-Assessment  1. Patient will perform grooming task seated with Setup Assist within 7 day(s). 2.  Patient will perform upper body bathing with Mod A within 7 day(s). 3.  Patient will perform upper body dressing with Mod A within 7 days. 4.  Patient will perform lower body dressing with Mod A within 7 day(s). 5.  Patient will perform BSC transfers with Min A using least restrictive device and 3 or fewer verbal cues within 7 day(s). 6.  Patient will perform front kimmy care with Min A for thoroughness within 7 day(s). Initiated 7/27/2020  1. Patient will perform sponge bathing seated with Setup Assist within 7 day(s). Not met; Modified  2. Patient will perform upper body dressing with independence within 7 day(s). Not met; Modified  3. Patient will perform lower body dressing with Min A within 7 day(s). Not met; Modified  4. Patient will perform toilet transfers with CGA using least restrictive device within 7 day(s). Not met; Modified  5. Patient will perform all aspects of toileting with CGA within 7 day(s). Not met;  Modified           Outcome: Progressing Towards Goal   OCCUPATIONAL THERAPY TREATMENT  Patient: Konstantin Reese (60 y.o. female)  Date: 8/4/2020  Diagnosis: COVID-19 ruled out [Z03.818]   <principal problem not specified> Precautions:    Chart, occupational therapy assessment, plan of care, and goals were reviewed. ASSESSMENT  Patient continues with skilled OT services and is progressing towards goals. Pt performed bed mobility with max A X2, and was able to sit EOB. The air mattress bed is difficult for her to move on and sit EOB. Pt was generally alert, she performed seated grooming tasks as well as activities to increase trunk control. Pt will benefit from tristan lift to extra wide recliner chair where she will be safer in attempting to perform sit to stand. Pain continues in her L LE as well as significant weakness. Will need rehab at discharge     Current Level of Function Impacting Discharge (ADLs): maximal assistance X2    Other factors to consider for discharge:          PLAN :  Patient continues to benefit from skilled intervention to address the above impairments. Continue treatment per established plan of care. to address goals. Recommend with staff: encourage upright at bed level and participate in adls. Recommend next OT session: tristan to chair to practice standing for adls. Recommendation for discharge: (in order for the patient to meet his/her long term goals)  To be determined: will likely need rehab    This discharge recommendation:  Has not yet been discussed the attending provider and/or case management    IF patient discharges home will need the following DME: tbd       SUBJECTIVE:   Patient stated It's a mess.   (has matted hair when attempting to comb)    OBJECTIVE DATA SUMMARY:   Cognitive/Behavioral Status:  Neurologic State: Confused(periodic confusion)  stares off at times but easily redirected  Orientation Level: Oriented to person;Disoriented to time;Disoriented to situation;Disoriented to place  Cognition: Memory loss; Impaired decision making             Functional Mobility and Transfers for ADLs:  Bed Mobility:  Rolling: Maximum assistance;Assist x2  Supine to Sit: Maximum assistance;Assist x2  Sit to Supine: Total assistance  Scooting:  Total assistance    Transfers:             Balance:  Sitting: Intact  Sitting - Static: Good (unsupported)  Sitting - Dynamic: Good (unsupported)    ADL Intervention:     Pt was able to sit EOB and perform grooming tasks                                     Therapeutic Exercises:   Worked on BUE/trunk control seated EOB    Pain:  LLE > RLE    Activity Tolerance:   Fair  VSS  P    After treatment patient left in no apparent distress:   Supine in bed, Call bell within reach, Bed / chair alarm activated, Side rails x 3, and nursing notified    COMMUNICATION/COLLABORATION:   The patients plan of care was discussed with: Physical therapist.     CHARLES Ortega/L  Time Calculation: 46 mins

## 2020-08-04 NOTE — PROGRESS NOTES
Problem: Mobility Impaired (Adult and Pediatric)  Goal: *Acute Goals and Plan of Care (Insert Text)  Description: FUNCTIONAL STATUS PRIOR TO ADMISSION: Patient was modified independent using a single point cane for functional mobility. Patient reports she sponge bathes. Denies any falls. HOME SUPPORT PRIOR TO ADMISSION: The patient lived with her daughter and family but did not require assist.     Physical Therapy Goals  Initiated 7/27/2020  1. Patient will move from supine to sit and sit to supine , scoot up and down, and roll side to side in bed with modified independence within 7 day(s). 2.  Patient will transfer from bed to chair and chair to bed with minimal assistance/contact guard assist using the least restrictive device within 7 day(s). 3.  Patient will perform sit to stand with minimal assistance/contact guard assist within 7 day(s). 4.  Patient will ambulate with moderate assistance  for 10 feet with the least restrictive device within 7 day(s). Revised 8/3/2020  1. Patient will move from supine to sit and sit to supine , scoot up and down, and roll side to side in bed with modA within 7 day(s). 2.  Patient will transfer from bed to chair and chair to bed with maxA using the least restrictive device within 7 day(s). 3.  Patient will perform sit to stand with maxA within 7 day(s). 4.  Patient will ambulate with maximum assistance  for 10 feet with the least restrictive device within 7 day(s). Outcome: Not Progressing Towards Goal   PHYSICAL THERAPY TREATMENT  Patient: Doyle Forte (57 y.o. female)  Date: 8/4/2020  Diagnosis: COVID-19 ruled out [Z03.818]   <principal problem not specified>       Precautions:    Chart, physical therapy assessment, plan of care and goals were reviewed. ASSESSMENT  Patient continues with skilled PT services and is not progressing towards goals.  Patient received in bed and agreeable to participate, follows simple commands but was slow to respond at times during session. Patient continues to require max to total assist x 2 for bed mobility, but was able to sit EOB without support and worked on UE and core exercises, is able to move right LE antigravity but complains of pain in left. Patient unable to scoot and not safe to attempt transfer to stand today from airbed. Discussed with RN use of Soheila lift to transfer to chair and plan to work on standing from chair during tomorrow's session. Patient left in supine with call bell in reach and VSS. Patient would benefit from rehab in SNF post discharge. Current Level of Function Impacting Discharge (mobility/balance): max to total assist x 2    Other factors to consider for discharge: complicated hosp stay, high falls risk, poor activity tolerance         PLAN :  Patient continues to benefit from skilled intervention to address the above impairments. Continue treatment per established plan of care. to address goals. Recommendation for discharge: (in order for the patient to meet his/her long term goals)  Therapy up to 5 days/week in SNF setting    This discharge recommendation:  Has been made in collaboration with the attending provider and/or case management    IF patient discharges home will need the following DME: to be determined (TBD)       SUBJECTIVE:   Patient stated Sometimes I zone out.     OBJECTIVE DATA SUMMARY:   Critical Behavior:  Neurologic State: Alert, Confused(periodic)  Orientation Level: Disoriented to person, Disoriented to place, Disoriented to time  Cognition: Follows commands  Safety/Judgement: Fall prevention  Functional Mobility Training:  Bed Mobility:  Rolling: Maximum assistance;Assist x2  Supine to Sit: Maximum assistance;Assist x2  Sit to Supine: Total assistance  Scooting:  Total assistance     Interventions: Safety awareness training;Verbal cues  Transfers:                                   Balance:  Sitting: Intact  Sitting - Static: Good (unsupported)  Sitting - Dynamic: Good (unsupported)  Ambulation/Gait Training:                                                        Stairs: Therapeutic Exercises:   LAQ right only, ankle pumps, reaching, head turns, weightshifts, trunk rotations  Pain Rating:      Activity Tolerance:   Poor  Please refer to the flowsheet for vital signs taken during this treatment. After treatment patient left in no apparent distress:   Supine in bed, Call bell within reach, and Side rails x 3    COMMUNICATION/COLLABORATION:   The patients plan of care was discussed with: Occupational therapist and Registered nurse.      Stephenie Hannah PT   Time Calculation: 52 mins

## 2020-08-04 NOTE — PROGRESS NOTES
Problem: Falls - Risk of  Goal: *Absence of Falls  Description: Document Selina Cagles Fall Risk and appropriate interventions in the flowsheet.   Outcome: Progressing Towards Goal  Note: Fall Risk Interventions:  Mobility Interventions: Bed/chair exit alarm, Communicate number of staff needed for ambulation/transfer, OT consult for ADLs, Patient to call before getting OOB, PT Consult for mobility concerns, PT Consult for assist device competence, Strengthening exercises (ROM-active/passive), Utilize walker, cane, or other assistive device, Utilize gait belt for transfers/ambulation    Mentation Interventions: Bed/chair exit alarm, Adequate sleep, hydration, pain control, Door open when patient unattended, Evaluate medications/consider consulting pharmacy, Increase mobility, More frequent rounding, Reorient patient, Room close to nurse's station, Self-releasing belt, Toileting rounds, Update white board, Gait belt with transfers/ambulation    Medication Interventions: Bed/chair exit alarm, Evaluate medications/consider consulting pharmacy, Patient to call before getting OOB, Teach patient to arise slowly, Utilize gait belt for transfers/ambulation    Elimination Interventions: Bed/chair exit alarm, Call light in reach, Elevated toilet seat, Patient to call for help with toileting needs, Stay With Me (per policy), Toilet paper/wipes in reach, Toileting schedule/hourly rounds

## 2020-08-04 NOTE — PROGRESS NOTES
PULMONARY/Critical Care/SLEEP MEDICINE  [facility]  Name: Mabel Blood   : 1960   MRN: [id number]   Date: 2020 9:33 AM       []      The patient is on      []      Mechanical ventilation []      Pressors   []      BiPAP []         Problems  Pt is on 3 liters nc. Sat 97 % and sleeping comfortably     Assessment:    1. Acute respiratory failure with hypercarbia, hypoxia-   2. Afib with RVR now on Lopressor and rhythm today  Is nsr  3. Staph bronchitis vs early bronchopneumonia  4. CHF ? Systolic/diastolic? Good diuresis  5. Hypotension ? Sedation related-resolved   6. Ventilator dependence- extubated and on  Nc.    7. obesity  8. High suspicion JOSLYN  9. Hypercapnea  10. H/o tobacco   11. Asthma hx         Plan:    BiPAP to continue at night, on NC oxygen at present. Wean as able   Wean oxygen as tolerated  Diuretics as tolerated  Prn Diuretics as tolerated  bronchial hygine  Empiric abx  DVT ppx  Pt/ OT  Outpatient PFT  Outpatient sleep study  Will ask office to call her for fu   No acute distress  On bipap at night only- she cannot recall if  She wore it last pm .   Par will sign  Off at this time. Please call if needed. Was on lasix before admit at 20 mg a day - consider restart at 40 mg a day and follow lytes              Vital Signs:    Visit Vitals  /75 (BP 1 Location: Left arm, BP Patient Position: At rest)   Pulse 73   Temp 97.5 °F (36.4 °C)   Resp 18   Ht 5' 3\" (1.6 m)   Wt 146.7 kg (323 lb 8 oz)   SpO2 97%   BMI 57.31 kg/m²       O2 Device: Nasal cannula   O2 Flow Rate (L/min): 3 l/min   Temp (24hrs), Av.8 °F (36.6 °C), Min:97.1 °F (36.2 °C), Max:98.1 °F (36.7 °C)       Intake/Output:   Last shift:      701 - 1900  In: 480 [P.O.:480]  Out: -   Last 3 shifts: 1901 -  0700  In: 545 [P.O.:220;  I.V.:325]  Out: 965 [Urine:965]    Intake/Output Summary (Last 24 hours) at 2020 1326  Last data filed at 2020 0805  Gross per 24 hour Intake 700 ml   Output 500 ml   Net 200 ml     Hemodynamics:   PAP:     Wedge:     CVP:  CVP (mmHg): 15 mmHg (08/02/20 1400)   CO:     CI:     SVR:     PVR:          Physical Exam:    General: []      Intubated/sedated [x]      No acute distress []          []      Ill appearing []       obese            HEENT: []     ETT [x]      PERRL []     NGT     []      Anicteric Mucosa:[]      moist   [x]      dry [x]   Edentulous and m3         Resp: []      Wheeze [x]      Clear to ascultation bilaterally []      Accessory muscle use    []      Rales []      Chest tube:  []      Air leak []          []      Ronchi []      Crepitus []         []      Coarse bilateral ventilator breath sounds      CV: [x]      Regular []      Tachycardia []          []      Irregular []      Bradycardic []          []      Murmur []      S1 []          []     RUB []    S2 []            GI: [x]      Soft  []      NG Tube Bowel sounds: []      present []      absent    []      Firm []      PEG []    Tender      []      Distended  []            : []      Petersen []      Hematuria []          [x]      Clear urine []       []            MSK:    []      SCDs [x]      Edema []   clubbing       [x]      No deformities []     Cyanosis  []            Skin: []      Warm [x]      Dry  []   Mottled       []      Cool []      Moist []          []     Lesions []      Diaphoretic []          []      Rash  []      Cyanosis []            N-psych: []      Sedated  []      Agitated []     Moves all extremities     [x]      Alert  Follows commands:   [x]      Yes  []      No []     oriented       Devices: []      PA Catheter []      Tracheostomy []          []      Central Line []        []        Chest tube:  Air leak? []        Y/[]        N    []          []      PICC []       []            DATA:   Current Facility-Administered Medications   Medication Dose Route Frequency    diphenhydrAMINE (BENADRYL) capsule 25 mg  25 mg Oral Q6H PRN    enoxaparin (LOVENOX) injection 40 mg  40 mg SubCUTAneous Q12H    insulin lispro (HUMALOG) injection   SubCUTAneous AC&HS    clotrimazole (LOTRIMIN) 1 % cream   Topical BID    miconazole (SECURA) 2 % extra thick cream   Topical BID    phenol throat spray (CHLORASEPTIC) 1 Spray  1 Spray Oral PRN    balsam peru-castor oiL (VENELEX) ointment   Topical Q12H    alcohol 62% (NOZIN) nasal  1 Ampule  1 Ampule Topical Q12H    glucose chewable tablet 16 g  4 Tab Oral PRN    dextrose (D50W) injection syrg 12.5-25 g  12.5-25 g IntraVENous PRN    glucagon (GLUCAGEN) injection 1 mg  1 mg IntraMUSCular PRN    sodium chloride (NS) flush 5-40 mL  5-40 mL IntraVENous Q8H    sodium chloride (NS) flush 5-40 mL  5-40 mL IntraVENous PRN    acetaminophen (TYLENOL) tablet 650 mg  650 mg Oral Q6H PRN    Or    acetaminophen (TYLENOL) suppository 650 mg  650 mg Rectal Q6H PRN    polyethylene glycol (MIRALAX) packet 17 g  17 g Oral DAILY PRN    ondansetron (ZOFRAN) injection 4 mg  4 mg IntraVENous Q6H PRN    aspirin chewable tablet 81 mg  81 mg Oral DAILY    metoprolol tartrate (LOPRESSOR) tablet 25 mg  25 mg Oral BID    atorvastatin (LIPITOR) tablet 10 mg  10 mg Oral DAILY    hydrALAZINE (APRESOLINE) 20 mg/mL injection 20 mg  20 mg IntraVENous Q6H PRN       Telemetry: [x]        Sinus []        A-flutter []        Paced    []        A-fib []        Multiple PVCs                  Labs:  Recent Labs     08/04/20 0329 08/03/20 0441 08/02/20 0429   WBC 7.5 8.8 7.4   HGB 12.6 12.4 12.6   HCT 43.1 43.4 42.9   * 157 152     Recent Labs     08/04/20 0329 08/03/20 0441 08/02/20 0429    134* 134*   K 4.4 4.5 4.9   CL 99 96* 96*   CO2 40* 42* 39*   * 118* 159*   BUN 23* 28* 35*   CREA 0.67 0.72 0.90   CA 8.8 9.1 9.4   MG  --   --  2.6*   PHOS  --   --  1.6*   ALB  --   --  2.7*   TBILI  --   --  0.6   ALT  --   --  16     No results for input(s): PH, PCO2, PO2, HCO3, FIO2 in the last 72 hours.     Imaging:  [] I have personally reviewed the patients radiographs     []      No change from prior, tubes and lines in adequate position  []      Improved   []      Worsening     IMPRESSION:     The patient is: [x]       acutely ill Risk of deterioration: []       moderate    []       critically ill  []       high     cxr reports seen - unable to view image  Due to computer upgrade  And computers no longer have pacs option     Katie Crowder MD

## 2020-08-04 NOTE — PROGRESS NOTES
Problem: Falls - Risk of  Goal: *Absence of Falls  Description: Document Analilia Mcrae Fall Risk and appropriate interventions in the flowsheet. Outcome: Progressing Towards Goal  Note: Fall Risk Interventions:  Mobility Interventions: Bed/chair exit alarm, Communicate number of staff needed for ambulation/transfer    Mentation Interventions: Adequate sleep, hydration, pain control, Bed/chair exit alarm, Door open when patient unattended    Medication Interventions: Bed/chair exit alarm    Elimination Interventions: Bed/chair exit alarm, Call light in reach, Toileting schedule/hourly rounds              Problem: Pressure Injury - Risk of  Goal: *Prevention of pressure injury  Description: Document Gaston Scale and appropriate interventions in the flowsheet.   Outcome: Progressing Towards Goal  Note: Pressure Injury Interventions:  Sensory Interventions: Assess changes in LOC    Moisture Interventions: Absorbent underpads, Apply protective barrier, creams and emollients, Check for incontinence Q2 hours and as needed, Internal/External urinary devices    Activity Interventions: Pressure redistribution bed/mattress(bed type)    Mobility Interventions: HOB 30 degrees or less, Pressure redistribution bed/mattress (bed type)    Nutrition Interventions: Document food/fluid/supplement intake    Friction and Shear Interventions: Apply protective barrier, creams and emollients, HOB 30 degrees or less, Minimize layers                Problem: Breathing Pattern - Ineffective  Goal: *Absence of hypoxia  Outcome: Progressing Towards Goal

## 2020-08-04 NOTE — ROUTINE PROCESS
RAPID RESPONSE TEAM-follow up Rounded on patient due to transfer from CCU; initially admitted for hypoxic/hypercarbic respiratory failure requiring intubation, HF exacerbation, and metabolic encephalopathy. Had increasing AMS today and patient was slower to respond to commands; ABG subsequently ordered. 1602 Arterial Blood Gas result:  pO2 62; pCO2 77.5; pH 7.33;  HCO3 41. No CT of head ordered. Patient currently able to follow commands, but does not answer other questions. PRN Bipap order; patient to wear Bipap at night. 2225: Spoke to Marcela Kulkarni MD regarding patient's inability to verbalize/answer questions. CT/CTA with contrast ordered. López Gannon Rater Patient Vitals for the past 12 hrs: 
 Temp Pulse Resp BP SpO2  
08/03/20 1844 98.1 °F (36.7 °C) 77 20 101/51 92 % 08/03/20 1757 98.1 °F (36.7 °C) 82 20 116/60 93 % 08/03/20 1658  82 20 130/63 95 % 08/03/20 1500     94 % 08/03/20 1448 98 °F (36.7 °C) 81 20 120/63 94 % 08/03/20 1300     94 % 08/03/20 1238 98 °F (36.7 °C) 76 (!) 32 117/63 95 % CTA results: 
 
CT Results  (Last 48 hours) 08/03/20 2321  CTA HEAD NECK W CONT Final result Impression:  IMPRESSION:  
   
No acute intracranial process No major vessel occlusion. No aneurysm or dissection. Frank Kunal Narrative:  EXAM: CTA HEAD NECK W CONT Clinical history: Speech changes not answering questions INDICATION: aphasia/ not answering questions COMPARISON: None. CONTRAST: 100 mL of Isovue-370. TECHNIQUE:  Unenhanced  images were obtained to localize the volume for  
acquisition. Multislice helical axial CT angiography was performed from the  
aortic arch to the top of the head during uneventful rapid bolus intravenous  
contrast administration. Coronal and sagittal reformations and 3D post  
processing was performed.   CT dose reduction was achieved through use of a  
 standardized protocol tailored for this examination and automatic exposure  
control for dose modulation. FINDINGS:  
   
CTA NECK There is conventional three vessel arch anatomy. Atherosclerotic change of the  
thoracic aorta. Coronary mass nodule. No pneumothorax. .  
   
% of right carotid artery stenosis: 0  
% of left carotid artery stenosis: Less than 50 NASCET method was utilized for calculating stenosis. Left vertebral artery is larger than the right. .  The cervical soft tissues are  
unremarkable. There are degenerative changes of the cervical spine. CTA HEAD Left vertebral artery is larger than the right. A 2 segments are within normal  
limits. There are A1 segments bilaterally. M1 segments are patent. 2 segments  
demonstrate symmetric arborization. . The basilar artery and its branches are  
normal. The internal carotid, anterior cerebral, and middle cerebral arteries  
are patent. There is no flow-limiting intracranial stenosis. There is no  
aneurysm. There are no sizable posterior communicating arteries. 08/03/20 2321  CT HEAD WO CONT Final result Impression:  IMPRESSION:   
No acute intracranial process. Narrative:  EXAM: CT HEAD WO CONT INDICATION: aphasia/ not answering questions COMPARISON: None. CONTRAST: None. TECHNIQUE: Unenhanced CT of the head was performed using 5 mm images. Brain and  
bone windows were generated. Coronal and sagittal reformats. CT dose reduction  
was achieved through use of a standardized protocol tailored for this  
examination and automatic exposure control for dose modulation. FINDINGS:  
The ventricles and sulci are normal in size, shape and configuration. . There is  
no significant white matter disease. There is no intracranial hemorrhage,  
extra-axial collection, or mass effect. The basilar cisterns are open. No CT  
evidence of acute infarct. The bone windows demonstrate no abnormalities. The visualized portions of the  
paranasal sinuses and mastoid air cells are clear.

## 2020-08-04 NOTE — PROGRESS NOTES
Hospitalist Progress Note    NAME: Anton Benítez   :  1960   MRN:  [id number]     Admit date: 2020    Today's date: 20    PCP: Ja Shaw NP    Assessment / Plan:  Acute respiratory failure with hypoxia and hypercarbia POA s/p intubation   Probable acute on chronic diastolic CHF POA LVEF 70-61%  ? Acute bronchitis POA   Underlying JOSLYN or OHS POA with Body mass index is 58.58 kg/m². Metabolic encephalopathy POA due to hypercarbia   COVID-19 rule out POA testing negative x 2  metabolic alkalosis , maybe contraction alkalosis   Baseline does not wear home o2, admits to chronic CHOPRA  Past few days worsening CHOPRA and SOB at rest  Positive LE edema and orthopnea  No fevers, positive dry cough  HCO3 elevated at admit 36, suspect some chronic CO2 retention at baseline  CXR with bilateral interstitial edema   pBNP 2,987  procalcitonin < 0.05  Differential; Acute left CHF, atypical pneumonia    extubated   No clear pneumonia, pulm added antibiotics, sputum culture showed MSSA   Will DC vanco and s/p cefipime , switched to cefazolin . Cefazolin DC as pt was noticed to have urticaria .switched to lvq . Pt had gotten 6days of IV abx so far , will  Continue one more day of levaquin and stop  S/p continuous BIPap, now on NC , prn BIPAP and  at bedtime   Prn lasix . Repeat chest xray   Transferred out of icu    a dose of diamox ordered   Pt found confused , although awake   Will get abg stat: repeat ABG showed PH 7.33 PCO2 77 bicarb 41     8/4:  CTA Head and Head CT was ordered overnight for change in mental status. Change was likely related to pCO2 retention. Pt was on bipap overnight and seems to be doing well this morning. Currently on 3L NC. Pulmonary evals on going. Urticarial rash , allergic reaction?   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      DVT prophylaxis with lovenox     Code status: full code  NOK: daughter     Subjective:     Chief Complaint / Reason for Physician Visit f/u acute respiratory failure  Denies any issues this morning. Review of Systems:  Symptom Y/N Comments  Symptom Y/N Comments   Fever/Chills n   Chest Pain n    Poor Appetite    Edema     Cough n   Abdominal Pain n    Sputum    Joint Pain     SOB/CHOPRA y   Headache     Nausea/vomit n   Tolerating PT/OT     Diarrhea    Tolerating Diet y    Constipation    Other       Could NOT obtain due to:      Objective:     VITALS:   Last 24hrs VS reviewed since prior progress note.  Most recent are:  Patient Vitals for the past 24 hrs:   Temp Pulse Resp BP SpO2   08/04/20 0725 97.5 °F (36.4 °C) 73 18 108/75 97 %   08/04/20 0415     94 %   08/04/20 0235 97.1 °F (36.2 °C) 73 20 108/57 95 %   08/04/20 0116     97 %   08/04/20 0002 97.7 °F (36.5 °C) 76 18 98/49 93 %   08/03/20 1844 98.1 °F (36.7 °C) 77 20 101/51 92 %   08/03/20 1757 98.1 °F (36.7 °C) 82 20 116/60 93 %   08/03/20 1658  82 20 130/63 95 %   08/03/20 1500     94 %   08/03/20 1448 98 °F (36.7 °C) 81 20 120/63 94 %   08/03/20 1300     94 %   08/03/20 1238 98 °F (36.7 °C) 76 (!) 32 117/63 95 %       Intake/Output Summary (Last 24 hours) at 8/4/2020 0988  Last data filed at 8/4/2020 1712  Gross per 24 hour   Intake 220 ml   Output 500 ml   Net -280 ml        Wt Readings from Last 12 Encounters:   08/04/20 323 lb 8 oz (146.7 kg)   12/27/19 300 lb (136.1 kg)   01/30/19 300 lb (136.1 kg)   11/13/15 297 lb (134.7 kg)   03/17/15 308 lb 13.8 oz (140.1 kg)       PHYSICAL EXAM:  General: Alert, cooperative, NAD  EENT:  EOMI  Resp:  Crackles at bases bilaterally, no wheezing, on 3L NC  CV:  Regular  rhythm,  1+ edema  GI:  Soft, Non distended, Non tender. +Bowel sounds, no rebound  LN:  No cervical or inguinal MONICA  Neurologic:  AAOx4 , no focal deficit   Psych:    Not anxious nor agitated  Skin:  Rashes +++    Reviewed most current lab test results and cultures  YES  Reviewed most current radiology test results   YES  Review and summation of old records today    NO  Reviewed patient's current orders and MAR    YES  PMH/ reviewed - no change compared to H&P  ________________________________________________________________________  Care Plan discussed with:    Comments   Patient x    Family      RN x    Care Manager     Consultant                        Multidiciplinary team rounds were held today with , nursing, pharmacist and clinical coordinator. Patient's plan of care was discussed; medications were reviewed and discharge planning was addressed. ________________________________________________________________________  Total Time Spent: 25 mins    Comments   >50% of visit spent in counseling and coordination of care x    ________________________________________________________________________  Marco Archibald MD     Procedures: see electronic medical records for all procedures/Xrays and details which were not copied into this note but were reviewed prior to creation of Plan. LABS:  I reviewed today's most current labs and imaging studies.   Pertinent labs include:  Recent Labs     08/04/20  0329 08/03/20  0441 08/02/20  0429   WBC 7.5 8.8 7.4   HGB 12.6 12.4 12.6   HCT 43.1 43.4 42.9   PLT 141* 157 152     Recent Labs     08/04/20  0329 08/03/20  0441 08/02/20  0429    134* 134*   K 4.4 4.5 4.9   CL 99 96* 96*   CO2 40* 42* 39*   * 118* 159*   BUN 23* 28* 35*   CREA 0.67 0.72 0.90   CA 8.8 9.1 9.4   MG  --   --  2.6*   PHOS  --   --  1.6*   ALB  --   --  2.7*   TBILI  --   --  0.6   ALT  --   --  16

## 2020-08-04 NOTE — PROGRESS NOTES
RAPID RESPONSE TEAM - follow up    Rounded on patient due to recent transfer from CCU. Discussed with primary RN Stephanie Syed. No acute concerns, VSS, MEWS 1. Patient Vitals for the past 4 hrs:   Temp Pulse Resp BP SpO2   08/04/20 1429 98.4 °F (36.9 °C) 74 22 123/67 93 %         No RRT interventions indicated at this time. Please call with any questions or concerns.      Thony Peraza

## 2020-08-05 LAB
ANION GAP SERPL CALC-SCNC: ABNORMAL MMOL/L (ref 5–15)
BUN SERPL-MCNC: 23 MG/DL (ref 6–20)
BUN/CREAT SERPL: 28 (ref 12–20)
CALCIUM SERPL-MCNC: 8.8 MG/DL (ref 8.5–10.1)
CHLORIDE SERPL-SCNC: 98 MMOL/L (ref 97–108)
CO2 SERPL-SCNC: 40 MMOL/L (ref 21–32)
CREAT SERPL-MCNC: 0.83 MG/DL (ref 0.55–1.02)
D DIMER PPP FEU-MCNC: 1.37 MG/L FEU (ref 0–0.65)
FIBRINOGEN PPP-MCNC: 277 MG/DL (ref 200–475)
GLUCOSE BLD STRIP.AUTO-MCNC: 106 MG/DL (ref 65–100)
GLUCOSE BLD STRIP.AUTO-MCNC: 124 MG/DL (ref 65–100)
GLUCOSE BLD STRIP.AUTO-MCNC: 71 MG/DL (ref 65–100)
GLUCOSE BLD STRIP.AUTO-MCNC: 77 MG/DL (ref 65–100)
GLUCOSE SERPL-MCNC: 94 MG/DL (ref 65–100)
POTASSIUM SERPL-SCNC: 4.4 MMOL/L (ref 3.5–5.1)
SERVICE CMNT-IMP: ABNORMAL
SERVICE CMNT-IMP: ABNORMAL
SERVICE CMNT-IMP: NORMAL
SERVICE CMNT-IMP: NORMAL
SODIUM SERPL-SCNC: 136 MMOL/L (ref 136–145)

## 2020-08-05 PROCEDURE — 74011250636 HC RX REV CODE- 250/636: Performed by: INTERNAL MEDICINE

## 2020-08-05 PROCEDURE — 97110 THERAPEUTIC EXERCISES: CPT

## 2020-08-05 PROCEDURE — 87635 SARS-COV-2 COVID-19 AMP PRB: CPT

## 2020-08-05 PROCEDURE — 85379 FIBRIN DEGRADATION QUANT: CPT

## 2020-08-05 PROCEDURE — 65660000000 HC RM CCU STEPDOWN

## 2020-08-05 PROCEDURE — 94660 CPAP INITIATION&MGMT: CPT

## 2020-08-05 PROCEDURE — 36415 COLL VENOUS BLD VENIPUNCTURE: CPT

## 2020-08-05 PROCEDURE — 74011250637 HC RX REV CODE- 250/637: Performed by: INTERNAL MEDICINE

## 2020-08-05 PROCEDURE — 82962 GLUCOSE BLOOD TEST: CPT

## 2020-08-05 PROCEDURE — 85384 FIBRINOGEN ACTIVITY: CPT

## 2020-08-05 PROCEDURE — 80048 BASIC METABOLIC PNL TOTAL CA: CPT

## 2020-08-05 PROCEDURE — 97530 THERAPEUTIC ACTIVITIES: CPT

## 2020-08-05 PROCEDURE — 77010033678 HC OXYGEN DAILY

## 2020-08-05 RX ORDER — MICONAZOLE NITRATE 20 MG/G
CREAM TOPICAL 2 TIMES DAILY
Status: DISCONTINUED | OUTPATIENT
Start: 2020-08-05 | End: 2020-08-07 | Stop reason: HOSPADM

## 2020-08-05 RX ORDER — CHLORPHENIRAMINE MALEATE 4 MG
TABLET ORAL 2 TIMES DAILY
Status: DISCONTINUED | OUTPATIENT
Start: 2020-08-05 | End: 2020-08-07 | Stop reason: HOSPADM

## 2020-08-05 RX ADMIN — Medication: at 21:22

## 2020-08-05 RX ADMIN — ENOXAPARIN SODIUM 40 MG: 40 INJECTION, SOLUTION INTRAVENOUS; SUBCUTANEOUS at 12:01

## 2020-08-05 RX ADMIN — MICONAZOLE NITRATE: 20 CREAM TOPICAL at 21:23

## 2020-08-05 RX ADMIN — ENOXAPARIN SODIUM 40 MG: 40 INJECTION, SOLUTION INTRAVENOUS; SUBCUTANEOUS at 00:19

## 2020-08-05 RX ADMIN — Medication 10 ML: at 06:18

## 2020-08-05 RX ADMIN — Medication 10 ML: at 21:37

## 2020-08-05 RX ADMIN — CLOTRIMAZOLE: 10 CREAM TOPICAL at 09:23

## 2020-08-05 RX ADMIN — CASTOR OIL AND BALSAM, PERU: 788; 87 OINTMENT TOPICAL at 18:06

## 2020-08-05 RX ADMIN — Medication 10 ML: at 16:31

## 2020-08-05 RX ADMIN — ASPIRIN 81 MG CHEWABLE TABLET 81 MG: 81 TABLET CHEWABLE at 09:20

## 2020-08-05 RX ADMIN — CASTOR OIL AND BALSAM, PERU: 788; 87 OINTMENT TOPICAL at 05:00

## 2020-08-05 RX ADMIN — FUROSEMIDE 40 MG: 40 TABLET ORAL at 09:20

## 2020-08-05 RX ADMIN — ATORVASTATIN CALCIUM 10 MG: 20 TABLET, FILM COATED ORAL at 09:20

## 2020-08-05 RX ADMIN — MICONAZOLE NITRATE: 20 CREAM TOPICAL at 09:21

## 2020-08-05 RX ADMIN — METOPROLOL TARTRATE 25 MG: 25 TABLET, FILM COATED ORAL at 09:20

## 2020-08-05 RX ADMIN — METOPROLOL TARTRATE 25 MG: 25 TABLET, FILM COATED ORAL at 18:06

## 2020-08-05 NOTE — ROUTINE PROCESS
[facility] nurse) by Maria Ines Barbosa (offgoing nurse). Report included the following information SBAR, Procedure Summary, Recent Results and Cardiac Rhythm NSR.

## 2020-08-05 NOTE — PROGRESS NOTES
0700: Bedside shift change report given to MARIE Akhtar (oncoming nurse) by Temo Hines RN (offgoing nurse). Report included the following information SBAR, Intake/Output, MAR, Recent Results and Cardiac Rhythm NSR.     1030: Dr. Nidia Molina would like to reswab pt for COVID for placement at SNF.

## 2020-08-05 NOTE — PROGRESS NOTES
TELE-HOSPITALIST CROSS COVER NOTE:    Visit Vitals  BP 95/59 (BP 1 Location: Left arm, BP Patient Position: At rest)   Pulse 72   Temp 97.5 °F (36.4 °C)   Resp 18   Ht 5' 3\" (1.6 m)   Wt 148.3 kg (327 lb)   SpO2 100%   BMI 57.93 kg/m²         D/W RN; medical records reviewed; VS from this AM reviewed; MAP > 65; continue to monitor.       Dafne Bedoya

## 2020-08-05 NOTE — PROGRESS NOTES
Problem: Falls - Risk of  Goal: *Absence of Falls  Description: Document Shameka Dejesus Fall Risk and appropriate interventions in the flowsheet. Outcome: Progressing Towards Goal  Note: Fall Risk Interventions:  Mobility Interventions: Assess mobility with egress test, Bed/chair exit alarm, OT consult for ADLs, PT Consult for mobility concerns, PT Consult for assist device competence, Strengthening exercises (ROM-active/passive), Utilize walker, cane, or other assistive device, Patient to call before getting OOB    Mentation Interventions: Adequate sleep, hydration, pain control, Bed/chair exit alarm, Door open when patient unattended, Increase mobility, Reorient patient, More frequent rounding, Room close to nurse's station, Toileting rounds, Update white board    Medication Interventions: Bed/chair exit alarm, Patient to call before getting OOB, Utilize gait belt for transfers/ambulation, Teach patient to arise slowly    Elimination Interventions: Bed/chair exit alarm, Call light in reach, Patient to call for help with toileting needs, Toilet paper/wipes in reach, Toileting schedule/hourly rounds              Problem: Patient Education: Go to Patient Education Activity  Goal: Patient/Family Education  Outcome: Progressing Towards Goal     Problem: Pressure Injury - Risk of  Goal: *Prevention of pressure injury  Description: Document Gaston Scale and appropriate interventions in the flowsheet. Outcome: Progressing Towards Goal  Note: Pressure Injury Interventions:  Sensory Interventions: Assess changes in LOC, Keep linens dry and wrinkle-free, Maintain/enhance activity level, Minimize linen layers, Pressure redistribution bed/mattress (bed type), Sit a 90-degree angle/use footstool if needed, Turn and reposition approx.  every two hours (pillows and wedges if needed), Use 30-degree side-lying position    Moisture Interventions: Absorbent underpads, Apply protective barrier, creams and emollients, Check for incontinence Q2 hours and as needed, Internal/External urinary devices, Minimize layers, Offer toileting Q_hr, Moisture barrier    Activity Interventions: Pressure redistribution bed/mattress(bed type), Increase time out of bed, PT/OT evaluation    Mobility Interventions: HOB 30 degrees or less, PT/OT evaluation, Pressure redistribution bed/mattress (bed type), Turn and reposition approx.  every two hours(pillow and wedges)    Nutrition Interventions: Document food/fluid/supplement intake    Friction and Shear Interventions: Apply protective barrier, creams and emollients, Foam dressings/transparent film/skin sealants, HOB 30 degrees or less, Lift team/patient mobility team, Minimize layers, Sit at 90-degree angle                Problem: Patient Education: Go to Patient Education Activity  Goal: Patient/Family Education  Outcome: Progressing Towards Goal     Problem: Breathing Pattern - Ineffective  Goal: *Absence of hypoxia  Outcome: Progressing Towards Goal  Goal: *Use of effective breathing techniques  Outcome: Progressing Towards Goal

## 2020-08-05 NOTE — PROGRESS NOTES
Problem: Mobility Impaired (Adult and Pediatric)  Goal: *Acute Goals and Plan of Care (Insert Text)  Description: FUNCTIONAL STATUS PRIOR TO ADMISSION: Patient was modified independent using a single point cane for functional mobility. Patient reports she sponge bathes. Denies any falls. HOME SUPPORT PRIOR TO ADMISSION: The patient lived with her daughter and family but did not require assist.     Physical Therapy Goals  Initiated 7/27/2020  1. Patient will move from supine to sit and sit to supine , scoot up and down, and roll side to side in bed with modified independence within 7 day(s). 2.  Patient will transfer from bed to chair and chair to bed with minimal assistance/contact guard assist using the least restrictive device within 7 day(s). 3.  Patient will perform sit to stand with minimal assistance/contact guard assist within 7 day(s). 4.  Patient will ambulate with moderate assistance  for 10 feet with the least restrictive device within 7 day(s). Revised 8/3/2020  1. Patient will move from supine to sit and sit to supine , scoot up and down, and roll side to side in bed with modA within 7 day(s). 2.  Patient will transfer from bed to chair and chair to bed with maxA using the least restrictive device within 7 day(s). 3.  Patient will perform sit to stand with maxA within 7 day(s). 4.  Patient will ambulate with maximum assistance  for 10 feet with the least restrictive device within 7 day(s). Outcome: Progressing Towards Goal   PHYSICAL THERAPY TREATMENT  Patient: Josefina Cerrato (73 y.o. female)  Date: 8/5/2020  Diagnosis: COVID-19 ruled out [Z03.818]   <principal problem not specified>       Precautions:    Chart, physical therapy assessment, plan of care and goals were reviewed. ASSESSMENT  Patient continues with skilled PT services and is progressing towards goals. Pt presents with decreased strength and endurance.  Pt performed bed mobility at max A x2 with cueing for sequencing. Pt able to sit EOB unsupported and perform exercises. Will try to tristan pt to chair next session to work on sit to stand tranfers to improve strength. Current Level of Function Impacting Discharge (mobility/balance): bed mobility at max Ax2     Other factors to consider for discharge: decreased strength and endurance. PLAN :  Patient continues to benefit from skilled intervention to address the above impairments. Continue treatment per established plan of care. to address goals. Recommendation for discharge: (in order for the patient to meet his/her long term goals)  Therapy up to 5 days/week in SNF setting    This discharge recommendation:  Has been made in collaboration with the attending provider and/or case management    IF patient discharges home will need the following DME: to be determined (TBD)       SUBJECTIVE:   Patient stated I feel a little better today.     OBJECTIVE DATA SUMMARY:   Critical Behavior:  Neurologic State: Alert  Orientation Level: Oriented X4  Cognition: Decreased attention/concentration, Decreased command following, Memory loss  Safety/Judgement: Fall prevention  Functional Mobility Training:  Bed Mobility:  Rolling: Maximum assistance;Assist x2  Supine to Sit: Maximum assistance;Assist x2  Sit to Supine: Maximum assistance;Assist x2  Scooting: Maximum assistance;Assist x2        Balance:  Sitting: Intact  Sitting - Static: Good (unsupported)  Sitting - Dynamic: Good (unsupported)    Therapeutic Exercises:   Seated  LAQ x5  March x5  Shoulder flexion x10  Shoulder abduction x10  Shoulder retraction x5  Lateral trunk flexion x5  Pain Rating:  Pt reported pain in LLE     Activity Tolerance:   Fair and desaturates with exertion and requires oxygen  Please refer to the flowsheet for vital signs taken during this treatment.     After treatment patient left in no apparent distress:   Supine in bed, Call bell within reach and Side rails x 3    COMMUNICATION/COLLABORATION:   The patients plan of care was discussed with: Registered nurse.      Emeka Gonzalez PTA   Time Calculation: 29 mins

## 2020-08-05 NOTE — PROGRESS NOTES
Occupational Therapy  Medical record reviewed. Pt was sleeping upon arrival, but awoke to voice. Pt declines OT at this time reporting, \"I don't feel very good right now\" stating that she had the nurse increase her O2. Will defer at this time and continue to follow.

## 2020-08-05 NOTE — PROGRESS NOTES
RAMO: David Major    10:25am- CM met with pt at bedside to discuss d/c plan. CM informed pt that daughter selected  [facility] as first choice and [facility] as second choice. Medicare pt has received, reviewed, and signed 2nd IM letter informing them of their right to appeal the discharge. Signed copy has been placed on pt bedside chart. 10:15am-CM called pt's daughter Felisha Hatch to discuss d/c plan. CM informed pt's daughter that pt wanted CM to discuss SNF facilities with her. CM reviewed with pt's daughter the SNF list. Pt's daughter selected  [facility] as first choice and [facility] as second choice. Pt's daughter stated that pt's plan would be to return home after SNF. Pt's daughter provided verbal consent for [facility] document. [facility] document was placed in pt's bedside chart. Referral sent to [facility] and [facility] via [facility]    10:00am- CM met with pt at bedside to discuss d/c plan. CM discussed with pt about SNF. CM provided pt with SNF list. Pt instructed CM to call her daughter to discuss placement at SNF. CM will continue to follow patient for discharge planning needs and arrange for services as deemed necessary.     Melanie Rowley [address] [place], [place] [address] [facility]  [phone]

## 2020-08-05 NOTE — PROGRESS NOTES
Hospitalist Progress Note    NAME: Pili Andino   :  1960   MRN:  [id number]     Admit date: 2020    Today's date: 20    PCP: Desirae Munoz NP    Assessment / Plan:  Acute respiratory failure with hypoxia and hypercarbia POA s/p intubation   Probable acute on chronic diastolic CHF POA LVEF 27-53%  ? Acute bronchitis POA   Underlying JOSLYN or OHS POA with Body mass index is 58.58 kg/m². Metabolic encephalopathy POA due to hypercarbia   COVID-19 rule out POA testing negative x 2  metabolic alkalosis , maybe contraction alkalosis   Baseline does not wear home o2, admits to chronic CHOPRA  Past few days worsening CHOPRA and SOB at rest  Positive LE edema and orthopnea  No fevers, positive dry cough  HCO3 elevated at admit 36, suspect some chronic CO2 retention at baseline  CXR with bilateral interstitial edema   pBNP 2,987  procalcitonin < 0.05  Differential; Acute left CHF, atypical pneumonia    extubated   No clear pneumonia, pulm added antibiotics, sputum culture showed MSSA   Will DC vanco and s/p cefipime , switched to cefazolin . Cefazolin DC as pt was noticed to have urticaria .switched to lvq . Pt had gotten 6days of IV abx so far , will  Continue one more day of levaquin and stop  S/p continuous BIPap, now on NC , prn BIPAP and  at bedtime   Prn lasix . Repeat chest xray   Transferred out of icu    a dose of diamox ordered   Pt found confused , although awake   Will get abg stat: repeat ABG showed PH 7.33 PCO2 77 bicarb 41     :  CTA Head and Head CT was ordered overnight for change in mental status. Change was likely related to pCO2 retention. Pt was on bipap overnight and seems to be doing well this morning. Currently on 3L NC. Pulmonary evals on going. :  Plan for discharge to SNF later this week. Continue bipap at night, wean O2 as tolerated  Pulmonary eval appreciated  Lasix dose restarted    Urticarial rash , allergic reaction?   Diffuse itchy urticareal rash since abx switched to cefazolin   Will DC keflex , switch to levaquin   Prn benadryl   Will update allergy list     Afib with RVR - now rate controlled and SR  Started on amio drip , off the amio drip   Now on BB     Thrombocytopenia resolved   PLTs dropped from 210,000 to 120,000, improved to normal   Resume lovenox    unlikely POP  platelets 144  Normal coags, fibrinogen     Essential HTN POA  Hyperlipidemia POA  Resume  metoprolol and lipitor PRN labetalol  LDL 55,      DM type 2 POA  Not currently on treatment  POC, SSI  HgBa1c 6.0%     Remote tobacco use quit 40 years ago     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      DVT prophylaxis with lovenox     Code status: full code  NOK: daughter     Subjective:     Chief Complaint / Reason for Physician Visit f/u acute respiratory failure  Denies any issues this morning. Review of Systems:  Symptom Y/N Comments  Symptom Y/N Comments   Fever/Chills n   Chest Pain n    Poor Appetite    Edema     Cough n   Abdominal Pain n    Sputum    Joint Pain     SOB/CHOPRA y   Headache     Nausea/vomit n   Tolerating PT/OT     Diarrhea    Tolerating Diet y    Constipation    Other       Could NOT obtain due to:      Objective:     VITALS:   Last 24hrs VS reviewed since prior progress note.  Most recent are:  Patient Vitals for the past 24 hrs:   Temp Pulse Resp BP SpO2   08/05/20 0651 97.7 °F (36.5 °C) 72 18 114/54 99 %   08/05/20 0227 97.5 °F (36.4 °C) 72 18 95/59 100 %   08/04/20 2303 98.1 °F (36.7 °C) 69 18 101/60 96 %   08/04/20 1845 98.2 °F (36.8 °C) 72 18 106/49 97 %   08/04/20 1429 98.4 °F (36.9 °C) 74 22 123/67 93 %   08/04/20 1058 98 °F (36.7 °C) 74 (!) 32 109/60 98 %       Intake/Output Summary (Last 24 hours) at 8/5/2020 1002  Last data filed at 8/4/2020 2101  Gross per 24 hour   Intake 480 ml Output 350 ml   Net 130 ml        Wt Readings from Last 12 Encounters:   08/04/20 327 lb (148.3 kg)   12/27/19 300 lb (136.1 kg)   01/30/19 300 lb (136.1 kg)   11/13/15 297 lb (134.7 kg)   03/17/15 308 lb 13.8 oz (140.1 kg)       PHYSICAL EXAM:  General: Alert, cooperative, NAD  EENT:  EOMI  Resp:  Crackles at bases bilaterally, no wheezing, on 3L NC  CV:  Regular  rhythm,  1+ edema  GI:  Soft, Non distended, Non tender. +Bowel sounds, no rebound  LN:  No cervical or inguinal MONICA  Neurologic:  AAOx4 , no focal deficit   Psych:    Not anxious nor agitated  Skin:  Rashes +++    Reviewed most current lab test results and cultures  YES  Reviewed most current radiology test results   YES  Review and summation of old records today    NO  Reviewed patient's current orders and MAR    YES  PMH/SH reviewed - no change compared to H&P  ________________________________________________________________________  Care Plan discussed with:    Comments   Patient x    Family      RN x    Care Manager     Consultant                        Multidiciplinary team rounds were held today with , nursing, pharmacist and clinical coordinator. Patient's plan of care was discussed; medications were reviewed and discharge planning was addressed. ________________________________________________________________________  Total Time Spent: 25 mins    Comments   >50% of visit spent in counseling and coordination of care x    ________________________________________________________________________  Kendal Majano MD     Procedures: see electronic medical records for all procedures/Xrays and details which were not copied into this note but were reviewed prior to creation of Plan. LABS:  I reviewed today's most current labs and imaging studies.   Pertinent labs include:  Recent Labs     08/04/20  0329 08/03/20  0441   WBC 7.5 8.8   HGB 12.6 12.4   HCT 43.1 43.4   * 157     Recent Labs     08/05/20  0306 08/04/20  0329 08/03/20  0441    137 134*   K 4.4 4.4 4.5   CL 98 99 96*   CO2 40* 40* 42*   GLU 94 107* 118*   BUN 23* 23* 28*   CREA 0.83 0.67 0.72   CA 8.8 8.8 9.1

## 2020-08-06 LAB
ANION GAP SERPL CALC-SCNC: 1 MMOL/L (ref 5–15)
BUN SERPL-MCNC: 23 MG/DL (ref 6–20)
BUN/CREAT SERPL: 25 (ref 12–20)
CALCIUM SERPL-MCNC: 9.2 MG/DL (ref 8.5–10.1)
CHLORIDE SERPL-SCNC: 98 MMOL/L (ref 97–108)
CO2 SERPL-SCNC: 39 MMOL/L (ref 21–32)
CREAT SERPL-MCNC: 0.92 MG/DL (ref 0.55–1.02)
D DIMER PPP FEU-MCNC: 1.32 MG/L FEU (ref 0–0.65)
FIBRINOGEN PPP-MCNC: 345 MG/DL (ref 200–475)
GLUCOSE BLD STRIP.AUTO-MCNC: 109 MG/DL (ref 65–100)
GLUCOSE BLD STRIP.AUTO-MCNC: 158 MG/DL (ref 65–100)
GLUCOSE BLD STRIP.AUTO-MCNC: 159 MG/DL (ref 65–100)
GLUCOSE BLD STRIP.AUTO-MCNC: 71 MG/DL (ref 65–100)
GLUCOSE SERPL-MCNC: 88 MG/DL (ref 65–100)
HEALTH STATUS, XMCV2T: NORMAL
POTASSIUM SERPL-SCNC: 4.3 MMOL/L (ref 3.5–5.1)
SARS-COV-2, COV2: NOT DETECTED
SERVICE CMNT-IMP: ABNORMAL
SERVICE CMNT-IMP: NORMAL
SODIUM SERPL-SCNC: 138 MMOL/L (ref 136–145)
SOURCE, COVRS: NORMAL
SPECIMEN SOURCE, FCOV2M: NORMAL
SPECIMEN TYPE, XMCV1T: NORMAL

## 2020-08-06 PROCEDURE — 36415 COLL VENOUS BLD VENIPUNCTURE: CPT

## 2020-08-06 PROCEDURE — 77010033678 HC OXYGEN DAILY

## 2020-08-06 PROCEDURE — 74011250637 HC RX REV CODE- 250/637: Performed by: INTERNAL MEDICINE

## 2020-08-06 PROCEDURE — 80048 BASIC METABOLIC PNL TOTAL CA: CPT

## 2020-08-06 PROCEDURE — 97110 THERAPEUTIC EXERCISES: CPT

## 2020-08-06 PROCEDURE — 97530 THERAPEUTIC ACTIVITIES: CPT

## 2020-08-06 PROCEDURE — 94660 CPAP INITIATION&MGMT: CPT

## 2020-08-06 PROCEDURE — 65660000000 HC RM CCU STEPDOWN

## 2020-08-06 PROCEDURE — 74011250636 HC RX REV CODE- 250/636: Performed by: INTERNAL MEDICINE

## 2020-08-06 PROCEDURE — 85379 FIBRIN DEGRADATION QUANT: CPT

## 2020-08-06 PROCEDURE — 82962 GLUCOSE BLOOD TEST: CPT

## 2020-08-06 PROCEDURE — 97530 THERAPEUTIC ACTIVITIES: CPT | Performed by: OCCUPATIONAL THERAPIST

## 2020-08-06 PROCEDURE — 85384 FIBRINOGEN ACTIVITY: CPT

## 2020-08-06 RX ADMIN — CASTOR OIL AND BALSAM, PERU: 788; 87 OINTMENT TOPICAL at 05:00

## 2020-08-06 RX ADMIN — FUROSEMIDE 40 MG: 40 TABLET ORAL at 10:56

## 2020-08-06 RX ADMIN — MICONAZOLE NITRATE: 20 CREAM TOPICAL at 10:59

## 2020-08-06 RX ADMIN — ASPIRIN 81 MG CHEWABLE TABLET 81 MG: 81 TABLET CHEWABLE at 10:55

## 2020-08-06 RX ADMIN — Medication: at 10:59

## 2020-08-06 RX ADMIN — METOPROLOL TARTRATE 25 MG: 25 TABLET, FILM COATED ORAL at 18:53

## 2020-08-06 RX ADMIN — ATORVASTATIN CALCIUM 10 MG: 20 TABLET, FILM COATED ORAL at 10:56

## 2020-08-06 RX ADMIN — Medication 10 ML: at 05:13

## 2020-08-06 RX ADMIN — METOPROLOL TARTRATE 25 MG: 25 TABLET, FILM COATED ORAL at 10:56

## 2020-08-06 RX ADMIN — ENOXAPARIN SODIUM 40 MG: 40 INJECTION, SOLUTION INTRAVENOUS; SUBCUTANEOUS at 00:00

## 2020-08-06 RX ADMIN — Medication: at 22:31

## 2020-08-06 RX ADMIN — MICONAZOLE NITRATE: 20 CREAM TOPICAL at 22:32

## 2020-08-06 RX ADMIN — CASTOR OIL AND BALSAM, PERU: 788; 87 OINTMENT TOPICAL at 17:24

## 2020-08-06 RX ADMIN — ENOXAPARIN SODIUM 40 MG: 40 INJECTION, SOLUTION INTRAVENOUS; SUBCUTANEOUS at 12:58

## 2020-08-06 RX ADMIN — Medication 5 ML: at 14:00

## 2020-08-06 RX ADMIN — Medication 10 ML: at 22:30

## 2020-08-06 NOTE — PROGRESS NOTES
Hospitalist Progress Note    NAME: Maikel Flores   :  1960   MRN:  [id number]     Admit date: 2020    Today's date: 20    PCP: Noemi Frazier NP    Assessment / Plan:  Acute respiratory failure with hypoxia and hypercarbia POA s/p intubation   Probable acute on chronic diastolic CHF POA LVEF 88-54%  ? Acute bronchitis POA   Underlying JOSLYN or OHS POA with Body mass index is 58.58 kg/m². Metabolic encephalopathy POA due to hypercarbia   COVID-19 rule out POA testing negative x 2  metabolic alkalosis , maybe contraction alkalosis   Baseline does not wear home o2, admits to chronic CHOPRA  Past few days worsening CHORPA and SOB at rest  Positive LE edema and orthopnea  No fevers, positive dry cough  HCO3 elevated at admit 36, suspect some chronic CO2 retention at baseline  CXR with bilateral interstitial edema   pBNP 2,987  procalcitonin < 0.05  Differential; Acute left CHF, atypical pneumonia    extubated   No clear pneumonia, pulm added antibiotics, sputum culture showed MSSA   Will DC vanco and s/p cefipime , switched to cefazolin . Cefazolin DC as pt was noticed to have urticaria .switched to lvq . Pt had gotten 6days of IV abx so far , will  Continue one more day of levaquin and stop  S/p continuous BIPap, now on NC , prn BIPAP and  at bedtime   Prn lasix . Repeat chest xray   Transferred out of icu    a dose of diamox ordered   Pt found confused , although awake   Will get abg stat: repeat ABG showed PH 7.33 PCO2 77 bicarb 41     :  CTA Head and Head CT was ordered overnight for change in mental status. Change was likely related to pCO2 retention. Pt was on bipap overnight and seems to be doing well this morning. Currently on 3L NC. Pulmonary evals on going. :  Plan for discharge to SNF later this week.   Continue bipap at night, wean O2 as tolerated  Pulmonary eval appreciated  Lasix dose restarted    :  Continue bipap at night, wean O2 as tolerated  GYN follow up at discharge  Hopeful to DC to SNF tomorrow  Continue diuresis    Urticarial rash , allergic reaction? - improved  Diffuse itchy urticareal rash since abx switched to cefazolin   Will DC keflex , switch to levaquin   Prn benadryl   Will update allergy list     Afib with RVR - now rate controlled and SR  Started on amio drip , off the amio drip   Now on BB     Thrombocytopenia resolved   PLTs dropped from 210,000 to 120,000, improved to normal   Resume lovenox    unlikely POP  platelets 202  Normal coags, fibrinogen     Essential HTN POA  Hyperlipidemia POA  Resume  metoprolol and lipitor PRN labetalol  LDL 55,      DM type 2 POA  Not currently on treatment  POC, SSI  HgBa1c 6.0%     Remote tobacco use quit 40 years ago     Impaired mobility POA due to DJD knees  Not walking much at home, limited by knee pain     Fungal rash in trunk skin folds POA  Topical nystatin    Morbid obesity POA Body mass index is 58.53 kg/m².      DVT prophylaxis with lovenox     Code status: full code  NOK: daughter     Subjective:     Chief Complaint / Reason for Physician Visit f/u acute respiratory failure  Denies any issues this morning. Review of Systems:  Symptom Y/N Comments  Symptom Y/N Comments   Fever/Chills n   Chest Pain n    Poor Appetite    Edema     Cough n   Abdominal Pain n    Sputum    Joint Pain     SOB/CHOPRA y   Headache     Nausea/vomit n   Tolerating PT/OT     Diarrhea    Tolerating Diet y    Constipation    Other       Could NOT obtain due to:      Objective:     VITALS:   Last 24hrs VS reviewed since prior progress note.  Most recent are:  Patient Vitals for the past 24 hrs:   Temp Pulse Resp BP SpO2   08/06/20 0800     100 %   08/06/20 0737 98.4 °F (36.9 °C) 71 24 126/52 100 %   08/06/20 0331 98.5 °F (36.9 °C) 69 22 126/66 99 %   08/06/20 0247     96 %   08/05/20 2256 98.6 °F (37 °C) 70 23 109/64 98 %   08/05/20 2235     96 %   08/05/20 2018 98.5 °F (36.9 °C) 72 22 114/59 96 %   08/05/20 1433 98.7 °F (37.1 °C) 78 18 121/54 90 %   08/05/20 1116 97.8 °F (36.6 °C) 71 18 117/83 93 %       Intake/Output Summary (Last 24 hours) at 8/6/2020 1030  Last data filed at 8/5/2020 2018  Gross per 24 hour   Intake 598 ml   Output 1300 ml   Net -702 ml        Wt Readings from Last 12 Encounters:   08/06/20 330 lb 6.4 oz (149.9 kg)   12/27/19 300 lb (136.1 kg)   01/30/19 300 lb (136.1 kg)   11/13/15 297 lb (134.7 kg)   03/17/15 308 lb 13.8 oz (140.1 kg)       PHYSICAL EXAM:  General: Alert, cooperative, NAD  EENT:  EOMI  Resp:  Crackles at bases bilaterally, no wheezing, on 3L NC  CV:  Regular  rhythm,  1+ edema  GI:  Soft, Non distended, Non tender. +Bowel sounds, no rebound  LN:  No cervical or inguinal MONICA  Neurologic:  AAOx4 , no focal deficit   Psych:   Not anxious nor agitated  Skin:  Rashes improved    Reviewed most current lab test results and cultures  YES  Reviewed most current radiology test results   YES  Review and summation of old records today    NO  Reviewed patient's current orders and MAR    YES  PMH/ reviewed - no change compared to H&P  ________________________________________________________________________  Care Plan discussed with:    Comments   Patient x    Family      RN x    Care Manager     Consultant                        Multidiciplinary team rounds were held today with , nursing, pharmacist and clinical coordinator. Patient's plan of care was discussed; medications were reviewed and discharge planning was addressed. ________________________________________________________________________  Total Time Spent: 25 mins    Comments   >50% of visit spent in counseling and coordination of care x    ________________________________________________________________________  Lazaro Strauss MD     Procedures: see electronic medical records for all procedures/Xrays and details which were not copied into this note but were reviewed prior to creation of Plan. LABS:  I reviewed today's most current labs and imaging studies.   Pertinent labs include:  Recent Labs     08/04/20  0329   WBC 7.5   HGB 12.6   HCT 43.1   *     Recent Labs     08/06/20  0335 08/05/20  0306 08/04/20  0329    136 137   K 4.3 4.4 4.4   CL 98 98 99   CO2 39* 40* 40*   GLU 88 94 107*   BUN 23* 23* 23*   CREA 0.92 0.83 0.67   CA 9.2 8.8 8.8

## 2020-08-06 NOTE — PROGRESS NOTES
[address] END OF SHIFT REPORT  1930: Received report from [address] [facility] Tilly, RN. Assumed care of patient     Bedside shift change report GIVEN TO Fazal Walker RN. Report included the following information SBAR. SIGNIFICANT CHANGES DURING SHIFT:        CONCERNS TO ADDRESS WITH MD:          [address] NURSING NOTE   Admission Date 7/23/2020   Admission Diagnosis COVID-19 ruled out [Z03.818]   Consults IP CONSULT TO HOSPITALIST  IP CONSULT TO INTENSIVIST      Cardiac Monitoring [x] Yes [] No      Purposeful Hourly Rounding [x] Yes    Anna Score Total Score: 4   Anna score 3 or > [x] Bed Alarm [] Avasys [] 1:1 sitter [] Patient refused (Signed refusal form in chart)   Gaston Score Gaston Score: 13   Gaston score 14 or < [] PMT consult [] Wound Care consult    [x]  Specialty bed  [] Nutrition consult      Influenza Vaccine Received Flu Vaccine for Current Season (usually Sept-March): Not Flu Season           Oxygen needs? [] Room air Oxygen @  []1L    []2L    [x]3L   []4L    []5L   []6L via  NC   Chronic home O2 use?  [] Yes [x] No  Perform O2 challenge test and document in progress note using smart[employer]Kodak Alarise (.Homeoxygen)      Last bowel movement Last Bowel Movement Date: 08/03/20      Urinary Catheter [REMOVED] Urinary Catheter 07/24/20 Petersen-Indications for Use: Accurate measurement of urinary output     External Female Catheter 08/04/20-Urine Output (mL): 150 ml  [REMOVED] External Female Catheter 07/24/20-Urine Output (mL): 300 ml  [REMOVED] Urinary Catheter 07/24/20 Petersen-Urine Output (mL): 60 ml(bag emptied)     LDAs               Peripheral IV 08/03/20 Right Forearm (Active)   Site Assessment Clean, dry, & intact 08/05/20 2018   Phlebitis Assessment 0 08/05/20 2018   Infiltration Assessment 0 08/05/20 2018   Dressing Status Clean, dry, & intact 08/05/20 2018   Dressing Type Transparent;Tape 08/05/20 2018   Hub Color/Line Status Pink;Flushed 08/05/20 2018   Action Taken Open ports on tubing capped 08/05/20 0703   Alcohol Cap Used Yes 08/05/20 1433          External Female Catheter 08/04/20 (Active)   Site Assessment Clean, dry, & intact 08/05/20 2018   Repositioned Yes 08/05/20 2018   Perineal Care Yes 08/05/20 2018   Wick Changed No 08/05/20 2018   Suction Canister/Tubing Changed No 08/05/20 2018   Urine Output (mL) 150 ml 08/04/20 2101                   Readmission Risk Assessment Tool Score Low Risk            8       Total Score        3 Patient Length of Stay (>5 days = 3)    5 Pt. Coverage (Medicare=5 , Medicaid, or Self-Pay=4)        Criteria that do not apply:    Has Seen PCP in Last 6 Months (Yes=3, No=0)    . Living with Significant Other. Assisted Living. LTAC. SNF.  or   Rehab    IP Visits Last 12 Months (1-3=4, 4=9, >4=11)    Charlson Comorbidity Score (Age + Comorbid Conditions)       Expected Length of Stay 12d 0h   Actual Length of Stay 13

## 2020-08-06 NOTE — PROGRESS NOTES
Problem: Self Care Deficits Care Plan (Adult)  Goal: *Acute Goals and Plan of Care (Insert Text)  Description:   FUNCTIONAL STATUS PRIOR TO ADMISSION: Mod I to Setup A with performing basic ADLs using SPC and leaning on furniture PRN. Sister sets up bucket for Pt to sponge bathe while seated. Mod I with all aspects of toileting. Mostly walks barefoot in her home and doesn't wear socks. Says she can don/doff underpants w/o assist. Does not drive. Admits to x1 fall in past year. HOME SUPPORT: Lives with daughter and sister who provide Setup A with some ADLs and manage all IADLs. Occupational Therapy Goals    Initiated 8/3/2020 for 7 Day Weekly Re-Assessment  1. Patient will perform grooming task seated with Setup Assist within 7 day(s). 2.  Patient will perform upper body bathing with Mod A within 7 day(s). 3.  Patient will perform upper body dressing with Mod A within 7 days. 4.  Patient will perform lower body dressing with Mod A within 7 day(s). 5.  Patient will perform BSC transfers with Min A using least restrictive device and 3 or fewer verbal cues within 7 day(s). 6.  Patient will perform front kimmy care with Min A for thoroughness within 7 day(s). Initiated 7/27/2020  1. Patient will perform sponge bathing seated with Setup Assist within 7 day(s). Not met; Modified  2. Patient will perform upper body dressing with independence within 7 day(s). Not met; Modified  3. Patient will perform lower body dressing with Min A within 7 day(s). Not met; Modified  4. Patient will perform toilet transfers with CGA using least restrictive device within 7 day(s). Not met; Modified  5. Patient will perform all aspects of toileting with CGA within 7 day(s). Not met;  Modified           Outcome: Progressing Towards Goal   OCCUPATIONAL THERAPY TREATMENT  Patient: Aleksey Deleon (1 y.o. female)  Date: 8/6/2020  Diagnosis: COVID-19 ruled out [Z03.818]   <principal problem not specified> Precautions:  fall, pain  Chart, occupational therapy assessment, plan of care, and goals were reviewed. ASSESSMENT  Patient continues with skilled OT services and is slowly progressing towards goals. Pt participated in functional mobility. Pt mobility team used lift to transfer pt bed to chair and back. While seated in chair pt had her feet on the floor and able to put some weight into her feet. Practiced weight shifting , scooting and arm chair push ups (3) to her tolerance. Pt pulled up on RW to initiate standing, however, unable to raise her hips/clear her buttocks from the chair, but demonstrated good effort. Pt c/o pain in her back and around to her hips which limits her tolerance for mobility. Pt will need rehab at discharge. Current Level of Function Impacting Discharge (ADLs): maximal assistance / tristan lift for transfer    Other factors to consider for discharge: complex medical condition         PLAN :  Patient continues to benefit from skilled intervention to address the above impairments. Continue treatment per established plan of care. to address goals. Recommend with staff: tristan lift to chair throughout the day    Recommend next OT session: continue UE strengthening/standing trials    Recommendation for discharge: (in order for the patient to meet his/her long term goals)  Therapy up to 5 days/week in SNF setting    This discharge recommendation:  Has not yet been discussed the attending provider and/or case management    IF patient discharges home will need the following DME: tbd--would need tristan lift, hospital bed       SUBJECTIVE:   Patient stated it hurts right here.     OBJECTIVE DATA SUMMARY:   Cognitive/Behavioral Status:  Neurologic State: Alert;Drowsy  Orientation Level: Oriented to person;Oriented to place;Oriented to situation;Disoriented to time  Cognition: Decreased attention/concentration             Functional Mobility and Transfers for ADLs:  Bed Mobility: Total assistance    Transfers: Total assistance - tristan lift          Balance:   Sitting- good    ADL Intervention:     Pt set up for cmbing her hair in sitting using BUE to work on tangles at back of head                                     Therapeutic Exercises:   Weight shifting  Armchair push ups--3 attempts  Scooting individ hips forward and back    Pain:  C/o pain in R side/back--mainly with standing attempts    Activity Tolerance:   Poor, requires rest breaks  Please refer to the flowsheet for vital signs taken during this treatment.     After treatment patient left in no apparent distress:   Sitting in chair and Call bell within reach--mobility team called to use lift to return pt to bed at her request    COMMUNICATION/COLLABORATION:   The patients plan of care was discussed with: Physical therapist, Registered nurse, Certified nursing assistant/patient care technician and mobility team.     López Tello OTR/L  Time Calculation: 31 mins

## 2020-08-06 NOTE — PROGRESS NOTES
Problem: Falls - Risk of  Goal: *Absence of Falls  Description: Document [facility] Fall Risk and appropriate interventions in the flowsheet. Outcome: Progressing Towards Goal  Note: Fall Risk Interventions:  Mobility Interventions: Bed/chair exit alarm, OT consult for ADLs, Patient to call before getting OOB, PT Consult for mobility concerns, PT Consult for assist device competence    Mentation Interventions: Bed/chair exit alarm, Adequate sleep, hydration, pain control    Medication Interventions: Bed/chair exit alarm, Patient to call before getting OOB, Teach patient to arise slowly    Elimination Interventions: Bed/chair exit alarm, Call light in reach, Stay With Me (per policy), Toilet paper/wipes in reach, Toileting schedule/hourly rounds              Problem: Pressure Injury - Risk of  Goal: *Prevention of pressure injury  Description: Document Gaston Scale and appropriate interventions in the flowsheet.   Outcome: Progressing Towards Goal  Note: Pressure Injury Interventions:  Sensory Interventions: Assess changes in LOC, Assess need for specialty bed    Moisture Interventions: Absorbent underpads, Internal/External urinary devices, Check for incontinence Q2 hours and as needed, Maintain skin hydration (lotion/cream), Minimize layers, Assess need for specialty bed    Activity Interventions: Assess need for specialty bed, Chair cushion, Increase time out of bed, PT/OT evaluation, Trapeze to reposition, Pressure redistribution bed/mattress(bed type)    Mobility Interventions: Assess need for specialty bed, Float heels, Pressure redistribution bed/mattress (bed type), PT/OT evaluation    Nutrition Interventions: Document food/fluid/supplement intake    Friction and Shear Interventions: Apply protective barrier, creams and emollients, Minimize layers, Foam dressings/transparent film/skin sealants

## 2020-08-06 NOTE — PROGRESS NOTES
[facility] END OF SHIFT REPORT      Bedside shift change report GIVEN TO MARIE Chin. Report included the following information SBAR. SIGNIFICANT CHANGES DURING SHIFT:        CONCERNS TO ADDRESS WITH MD:          [facility] NURSING NOTE   Admission Date 7/23/2020   Admission Diagnosis COVID-19 ruled out [Z03.818]   Consults IP CONSULT TO HOSPITALIST  IP CONSULT TO INTENSIVIST      Cardiac Monitoring [x] Yes [] No      Purposeful Hourly Rounding [x] Yes    Anna Score Total Score: 4   Anna score 3 or > [] Bed Alarm [] Avasys [] 1:1 sitter [] Patient refused (Signed refusal form in chart)   Gaston Score Gaston Score: 13   Gaston score 14 or < [] PMT consult [] Wound Care consult    [x]  Specialty bed  [] Nutrition consult      Influenza Vaccine Received Flu Vaccine for Current Season (usually Sept-March): Not Flu Season           Oxygen needs? [] Room air Oxygen @  []1L    []2L    [x]3L   []4L    []5L   []6L via  NC   Chronic home O2 use?  [] Yes [x] No  Perform O2 challenge test and document in progress note using smartphrase (.Homeoxygen)      Last bowel movement Last Bowel Movement Date: 08/03/20      Urinary Catheter [REMOVED] Urinary Catheter 07/24/20 Petersen-Indications for Use: Accurate measurement of urinary output     External Female Catheter 08/04/20-Urine Output (mL): 100 ml  [REMOVED] External Female Catheter 07/24/20-Urine Output (mL): 300 ml  [REMOVED] Urinary Catheter 07/24/20 Petersen-Urine Output (mL): 60 ml(bag emptied)     LDAs               Peripheral IV 08/03/20 Right Forearm (Active)   Site Assessment Clean, dry, & intact 08/06/20 1400   Phlebitis Assessment 1 08/06/20 1400   Infiltration Assessment 0 08/06/20 1400   Dressing Status Clean, dry, & intact 08/06/20 1400   Dressing Type Tape;Transparent 08/06/20 1400   Hub Color/Line Status Pink;Flushed 08/06/20 1400   Action Taken Open ports on tubing capped 08/06/20 1400   Alcohol Cap Used Yes 08/06/20 1400          External Female Catheter 08/04/20 (Active) Site Assessment Clean, dry, & intact 08/06/20 1400   Repositioned Yes 08/06/20 1400   Perineal Care Yes 08/06/20 1400   Wick Changed No 08/06/20 1400   Suction Canister/Tubing Changed No 08/06/20 1400   Urine Output (mL) 100 ml 08/06/20 1400                   Readmission Risk Assessment Tool Score Low Risk            8       Total Score        3 Patient Length of Stay (>5 days = 3)    5 Pt. Coverage (Medicare=5 , Medicaid, or Self-Pay=4)        Criteria that do not apply:    Has Seen PCP in Last 6 Months (Yes=3, No=0)    . Living with Significant Other. Assisted Living. LTAC. SNF.  or   Rehab    IP Visits Last 12 Months (1-3=4, 4=9, >4=11)    Charlson Comorbidity Score (Age + Comorbid Conditions)       Expected Length of Stay 12d 0h   Actual Length of Stay 13

## 2020-08-06 NOTE — PROGRESS NOTES
Problem: Falls - Risk of  Goal: *Absence of Falls  Description: Document [facility] Fall Risk and appropriate interventions in the flowsheet. Outcome: Progressing Towards Goal  Note: Fall Risk Interventions:  Mobility Interventions: Bed/chair exit alarm    Mentation Interventions: Bed/chair exit alarm    Medication Interventions: Bed/chair exit alarm    Elimination Interventions: Bed/chair exit alarm              Problem: Patient Education: Go to Patient Education Activity  Goal: Patient/Family Education  Outcome: Progressing Towards Goal     Problem: Pressure Injury - Risk of  Goal: *Prevention of pressure injury  Description: Document Gaston Scale and appropriate interventions in the flowsheet.   Outcome: Progressing Towards Goal  Note: Pressure Injury Interventions:  Sensory Interventions: Assess changes in LOC    Moisture Interventions: Absorbent underpads    Activity Interventions: Assess need for specialty bed    Mobility Interventions: Assess need for specialty bed    Nutrition Interventions: Document food/fluid/supplement intake    Friction and Shear Interventions: Apply protective barrier, creams and emollients

## 2020-08-06 NOTE — PROGRESS NOTES
Problem: Mobility Impaired (Adult and Pediatric)  Goal: *Acute Goals and Plan of Care (Insert Text)  Description: FUNCTIONAL STATUS PRIOR TO ADMISSION: Patient was modified independent using a single point cane for functional mobility. Patient reports she sponge bathes. Denies any falls. HOME SUPPORT PRIOR TO ADMISSION: The patient lived with her daughter and family but did not require assist.     Physical Therapy Goals  Initiated 7/27/2020  1. Patient will move from supine to sit and sit to supine , scoot up and down, and roll side to side in bed with modified independence within 7 day(s). 2.  Patient will transfer from bed to chair and chair to bed with minimal assistance/contact guard assist using the least restrictive device within 7 day(s). 3.  Patient will perform sit to stand with minimal assistance/contact guard assist within 7 day(s). 4.  Patient will ambulate with moderate assistance  for 10 feet with the least restrictive device within 7 day(s). Revised 8/3/2020  1. Patient will move from supine to sit and sit to supine , scoot up and down, and roll side to side in bed with modA within 7 day(s). 2.  Patient will transfer from bed to chair and chair to bed with maxA using the least restrictive device within 7 day(s). 3.  Patient will perform sit to stand with maxA within 7 day(s). 4.  Patient will ambulate with maximum assistance  for 10 feet with the least restrictive device within 7 day(s). 8/6/2020 1522 by David Amin PT  Outcome: Not Progressing Towards Goal   PHYSICAL THERAPY TREATMENT  Patient: Josie Rock (65 y.o. female)  Date: 8/6/2020  Diagnosis: COVID-19 ruled out [Z03.818]   <principal problem not specified>       Precautions:    Chart, physical therapy assessment, plan of care and goals were reviewed. ASSESSMENT  Patient continues with skilled PT services and is not progressing towards goals.  Pt received sitting in bedside chair, working with OT.  Lift team transferred pt to bedside chair via tristan lift. Pt tolerated sitting in bedside chair approx 2 hours, before requesting to go back to bed. Session focused on sit>stand trials, LE exercises, and pt scooting in the bedside chair. Standing trials done with pt pulling on a stabilized RW  to initiate standing, however, unsuccessful with raising her hips/clearing her buttocks. Pt did demonstrate a good effort and noted weightbearing through BUE/BLE. Elieser Winchesternieves Pt c/o pain in her back and around to her hips which limits her tolerance for mobility. Pt with improved ability to scoot back in the chair, but required mod a x 2 at times     Current Level of Function Impacting Discharge (mobility/balance): total A    Other factors to consider for discharge: total A, unable to stand         PLAN :  Patient continues to benefit from skilled intervention to address the above impairments. Continue treatment per established plan of care. to address goals. Recommendation for discharge: (in order for the patient to meet his/her long term goals)  Therapy up to 5 days/week in SNF setting    This discharge recommendation:  Has been made in collaboration with the attending provider and/or case management    IF patient discharges home will need the following DME: bedside commode, hospital bed, mechanical lift, portable oxygen, and wheelchair       SUBJECTIVE:   Patient stated i'll try to pull up on the walker.     OBJECTIVE DATA SUMMARY:   Critical Behavior:  Neurologic State: Alert, Drowsy  Orientation Level: Oriented to person, Oriented to place, Oriented to situation, Disoriented to time  Cognition: Decreased attention/concentration  Safety/Judgement: Fall prevention  Functional Mobility Training:  Bed Mobility:   Deferred, pt seated in bedside chair on arrival                 Transfers:  Sit to Stand: (unable)                                Balance:  Sitting: Intact  Sitting - Static: Good (unsupported)    Therapeutic Exercises:   Seated: laq, toe raises, heel raises    Pain Rating:  Back, right lower trunk, right groin. Pt didn't quantify    Activity Tolerance:   Fair  Please refer to the flowsheet for vital signs taken during this treatment. After treatment patient left in no apparent distress:   Sitting in chair and Call bell within reach    COMMUNICATION/COLLABORATION:   The patients plan of care was discussed with: Occupational therapist and Registered nurse.      Chauncy Sandifer, PT   Time Calculation: 23 mins

## 2020-08-06 NOTE — PROGRESS NOTES
[address] is to go go [facility] for SNF possibly tomorrow, 8/7/2020  - SNF requires negative COVID test 48 hours prior to DC. COVID test was done yesterday 8/5/20 and is pending results at this time. -[facility] and [facility] will be able to take her tomorrow as long as COVID test comes back negative.   -Pt and family in agreement for transition to [facility] once stable. CM issued Second IM letter today. Medicare pt has received, reviewed, and signed 2nd IM letter informing them of their right to appeal the discharge. Signed copy has been placed on pt bedside chart. [facility] is on will call via [place]. CM notified to make Outpatient OB/GYN appt for Pt d/t Vaginal Bleeding. CM called [facility] [place]: [phone] with Dr. Wu Roberto on 8/10/20 at 2:45 PM.    CM called Francois PETERSON: [phone] and reviewed tentative DC plan and let her know about OB/GYN appt. CM noted that Pt has Medicare A and B only. CM asked Pt the Med Assist Questionaire and sent her an email with the response. Hoping that Med Assist can Screen her and let us know if UAI needs to be done for Pt. Pt may need LTC as she is tristan transfer or 2-3 person assist at this time. CM will continue to monitor discharge plan.      Mychal Martin

## 2020-08-06 NOTE — PROGRESS NOTES
1000: RN noted vaginal bleeding, bright red blood. Pt reports she is 10+ years post menopausal and that this is acute. Pt made no complaint of pain. MD notified. MD requested GYN referral for post-discharge.

## 2020-08-07 VITALS
TEMPERATURE: 98.2 F | HEIGHT: 63 IN | SYSTOLIC BLOOD PRESSURE: 131 MMHG | RESPIRATION RATE: 26 BRPM | OXYGEN SATURATION: 98 % | DIASTOLIC BLOOD PRESSURE: 61 MMHG | BODY MASS INDEX: 51.91 KG/M2 | HEART RATE: 80 BPM | WEIGHT: 293 LBS

## 2020-08-07 LAB
D DIMER PPP FEU-MCNC: 1.81 MG/L FEU (ref 0–0.65)
FIBRINOGEN PPP-MCNC: 414 MG/DL (ref 200–475)
GLUCOSE BLD STRIP.AUTO-MCNC: 140 MG/DL (ref 65–100)
GLUCOSE BLD STRIP.AUTO-MCNC: 74 MG/DL (ref 65–100)
SERVICE CMNT-IMP: ABNORMAL
SERVICE CMNT-IMP: NORMAL

## 2020-08-07 PROCEDURE — 82962 GLUCOSE BLOOD TEST: CPT

## 2020-08-07 PROCEDURE — 74011250637 HC RX REV CODE- 250/637: Performed by: INTERNAL MEDICINE

## 2020-08-07 PROCEDURE — 85384 FIBRINOGEN ACTIVITY: CPT

## 2020-08-07 PROCEDURE — 85379 FIBRIN DEGRADATION QUANT: CPT

## 2020-08-07 PROCEDURE — 74011250637 HC RX REV CODE- 250/637: Performed by: GENERAL ACUTE CARE HOSPITAL

## 2020-08-07 PROCEDURE — 36415 COLL VENOUS BLD VENIPUNCTURE: CPT

## 2020-08-07 PROCEDURE — 74011250636 HC RX REV CODE- 250/636: Performed by: INTERNAL MEDICINE

## 2020-08-07 PROCEDURE — 94660 CPAP INITIATION&MGMT: CPT

## 2020-08-07 RX ORDER — FUROSEMIDE 40 MG/1
40 TABLET ORAL DAILY
Qty: 30 TAB | Refills: 0 | Status: ON HOLD | OUTPATIENT
Start: 2020-08-07 | End: 2022-06-17 | Stop reason: SDUPTHER

## 2020-08-07 RX ADMIN — MICONAZOLE NITRATE: 20 CREAM TOPICAL at 08:13

## 2020-08-07 RX ADMIN — METOPROLOL TARTRATE 25 MG: 25 TABLET, FILM COATED ORAL at 08:13

## 2020-08-07 RX ADMIN — ASPIRIN 81 MG CHEWABLE TABLET 81 MG: 81 TABLET CHEWABLE at 08:13

## 2020-08-07 RX ADMIN — ATORVASTATIN CALCIUM 10 MG: 20 TABLET, FILM COATED ORAL at 08:13

## 2020-08-07 RX ADMIN — Medication: at 08:13

## 2020-08-07 RX ADMIN — CASTOR OIL AND BALSAM, PERU: 788; 87 OINTMENT TOPICAL at 05:00

## 2020-08-07 RX ADMIN — FUROSEMIDE 40 MG: 40 TABLET ORAL at 08:13

## 2020-08-07 RX ADMIN — Medication 10 ML: at 05:56

## 2020-08-07 RX ADMIN — ENOXAPARIN SODIUM 40 MG: 40 INJECTION, SOLUTION INTRAVENOUS; SUBCUTANEOUS at 00:29

## 2020-08-07 NOTE — PROGRESS NOTES
Transition of Care Plan to SNF/Rehab    SNF/Rehab Transition:  Patient has been accepted to [facility] and meets criteria for admission. Patient will transported by [facility] and expected to leave at 12:00P. Communication to Patient/Family:  Contacted  patient dtr via phone and they are agreeable to the transition plan. Communication to SNF/Rehab:  Bedside RN, Trey Rodriguez, has been notified to update the transition plan to the facility and call report (phone number [phone]). Discharge information has been updated on the AVS.     Discharge instructions to be fax'd to facility at [phone]. Nursing Please include all hard scripts for controlled substances, med rec and dc summary, and AVS in packet. Reviewed and confirmed with facility, Angelina Wynn regarding her follow up appt and Thao Simon regarding discharge time, can manage the patient care needs for the following:     Joie Jimenez with (X) only those applicable:    Medication:  []  Medications will be available at the facility  []  IV Antibiotics  []  Controlled Substance - hard copy to be sent with patient   []  Weekly Labs   Documents:  [] Hard RX  [] MAR  [] Kardex  [] AVS  []Transfer Summary  []Discharge   Equipment:  []  CPAP/BiPAP  []  Wound Vacuum  []  Petersen or Urinary Device  []  PICC/Central Line  []  Nebulizer  []  Ventilator   Treatment:  []Isolation (for MRSA, VRE, etc.)  []Surgical Drain Management  []Tracheostomy Care  []Dressing Changes  []Dialysis with transportation and chair time  []PEG Care  [x]Oxygen  []Daily Weights for Heart Failure   Dietary:  []Any diet limitations  []Tube Feedings   []Total Parenteral Management (TPN)   Eligible for Medicaid Long Term Services and Supports  Yes:  [] Eligible for medical assistance or will become eligible within 180 days and UAI completed. [x] Provider/Patient and/or support system has requested screening. MedAssist contacted to screen pt for Medicaid. Made aware of pt discharge.   [] UAI copy provided to patient or responsible party. [] UAI unavailable at discharge will send once processed to SNF provider. [] UAI unavailable at discharged mailed to patient  No:   [] Private pay and is not financially eligible for Medicaid within the next 180 days. [] Reside out-of-state. [] A residents of a state owned/operated facility that is licensed  by [address] and [facility] or [facility]  [] Enrollment in [facility] hospice services  []  Medical [facility]  [] Patient /Family declines to have screening completed or provide financial information for screening     Financial Resources:  Medicaid    [] Initiated and application pending   [] Full coverage     Advanced Care Plan:  []Surrogate Decision Maker of Care  []POA  []Communicated Code Status Full Code    Other     Care Management Interventions  PCP Verified by CM: Yes  Transition of Care Consult (CM Consult): Discharge Planning  Discharge Durable Medical Equipment: (Patient has bedside commode. She has walker , wheelchair and cane at home. )  Physical Therapy Consult: Yes  Occupational Therapy Consult: Yes  Current Support Network:  Other(Her daughter lives with her. )  Confirm Follow Up Transport: Family  Discharge Location  Discharge Placement: Skilled nursing facility    DOROTHY Castro  Ext [phone]

## 2020-08-07 NOTE — PROGRESS NOTES
Problem: Falls - Risk of  Goal: *Absence of Falls  Description: Document Ash Somers Fall Risk and appropriate interventions in the flowsheet. Outcome: Progressing Towards Goal  Note: Fall Risk Interventions:  Mobility Interventions: Bed/chair exit alarm, Assess mobility with egress test    Mentation Interventions: Adequate sleep, hydration, pain control, Bed/chair exit alarm    Medication Interventions: Bed/chair exit alarm, Patient to call before getting OOB, Teach patient to arise slowly    Elimination Interventions: Call light in reach, Bed/chair exit alarm              Problem: Pressure Injury - Risk of  Goal: *Prevention of pressure injury  Description: Document Gaston Scale and appropriate interventions in the flowsheet.   Outcome: Progressing Towards Goal  Note: Pressure Injury Interventions:  Sensory Interventions: Assess changes in LOC, Assess need for specialty bed    Moisture Interventions: Absorbent underpads, Apply protective barrier, creams and emollients    Activity Interventions: Increase time out of bed    Mobility Interventions: Assess need for specialty bed, Chair cushion    Nutrition Interventions: Document food/fluid/supplement intake, Offer support with meals,snacks and hydration    Friction and Shear Interventions: Feet elevated on foot rest                Problem: Breathing Pattern - Ineffective  Goal: *Absence of hypoxia  Outcome: Progressing Towards Goal  Goal: *Use of effective breathing techniques  Outcome: Progressing Towards Goal

## 2020-08-07 NOTE — PROGRESS NOTES
Problem: Falls - Risk of  Goal: *Absence of Falls  Description: Document Analilia Mcrae Fall Risk and appropriate interventions in the flowsheet.   Outcome: Progressing Towards Goal  Note: Fall Risk Interventions:  Mobility Interventions: Bed/chair exit alarm    Mentation Interventions: Adequate sleep, hydration, pain control    Medication Interventions: Bed/chair exit alarm    Elimination Interventions: Call light in reach              Problem: Patient Education: Go to Patient Education Activity  Goal: Patient/Family Education  Outcome: Progressing Towards Goal

## 2020-08-07 NOTE — PROGRESS NOTES
[address] END OF SHIFT REPORT      Bedside shift change report GIVEN TO Sleena Mancia RN. Report included the following information SBAR and Kardex. SIGNIFICANT CHANGES DURING SHIFT:  None. CONCERNS TO ADDRESS WITH MD:  None. [address] NURSING NOTE   Admission Date 7/23/2020   Admission Diagnosis COVID-19 ruled out [Z03.818]   Consults IP CONSULT TO HOSPITALIST  IP CONSULT TO INTENSIVIST      Cardiac Monitoring [x] Yes [] No      Purposeful Hourly Rounding [x] Yes    Anna Score Total Score: 4   Anna score 3 or > [x] Bed Alarm [] Avasys [] 1:1 sitter [] Patient refused (Signed refusal form in chart)   Gaston Score Gaston Score: 13   Gaston score 14 or < [] PMT consult [] Wound Care consult    []  Specialty bed  [] Nutrition consult      Influenza Vaccine Received Flu Vaccine for Current Season (usually Sept-March): Not Flu Season           Oxygen needs? [] Room air Oxygen @  []1L    []2L    [x]3L   []4L    []5L   []6L via  NC   Chronic home O2 use?  [] Yes [x] No  Perform O2 challenge test and document in progress note using smartphrase (.Homeoxygen)      Last bowel movement Last Bowel Movement Date: 08/03/20      Urinary Catheter [REMOVED] Urinary Catheter 07/24/20 Petersen-Indications for Use: Accurate measurement of urinary output     External Female Catheter 08/04/20-Urine Output (mL): 100 ml  [REMOVED] External Female Catheter 07/24/20-Urine Output (mL): 300 ml  [REMOVED] Urinary Catheter 07/24/20 Petersen-Urine Output (mL): 60 ml(bag emptied)     LDAs               Peripheral IV 08/03/20 Right Forearm (Active)   Site Assessment Clean, dry, & intact 08/07/20 0300   Phlebitis Assessment 0 08/07/20 0300   Infiltration Assessment 0 08/07/20 0300   Dressing Status Clean, dry, & intact 08/07/20 0300   Dressing Type Transparent;Tape 08/07/20 0300   Hub Color/Line Status Flushed;Pink 08/07/20 0300   Action Taken Open ports on tubing capped 08/06/20 1400   Alcohol Cap Used Yes 08/06/20 1400          External Female Catheter 08/04/20 (Active)   Site Assessment Clean, dry, & intact 08/07/20 0300   Repositioned Yes 08/07/20 0300   Perineal Care Yes 08/07/20 0300   Wick Changed Yes 08/07/20 0300   Suction Canister/Tubing Changed No 08/07/20 0300   Urine Output (mL) 100 ml 08/06/20 1400                   Readmission Risk Assessment Tool Score Low Risk            8       Total Score        3 Patient Length of Stay (>5 days = 3)    5 Pt. Coverage (Medicare=5 , Medicaid, or Self-Pay=4)        Criteria that do not apply:    Has Seen PCP in Last 6 Months (Yes=3, No=0)    . Living with Significant Other. Assisted Living. LTAC. SNF.  or   Rehab    IP Visits Last 12 Months (1-3=4, 4=9, >4=11)    Charlson Comorbidity Score (Age + Comorbid Conditions)       Expected Length of Stay 12d 0h   Actual Length of Stay 14

## 2020-08-07 NOTE — PROGRESS NOTES
DISCHARGE SUMMARY FROM NURSE    The patient is stable for discharge. I have reviewed the discharge instructions with the patient. The patient verbalized understanding. All questions were fully answered. The patient verbalized no complaints. Hard scripts and medication handouts were given and reviewed with the patient. Appropriate educational materials and medication side effects teaching were provided also provided. Cardiac monitor and IV line(s) were removed. The following personal items collected during your admission were returned to the patient/family  Home medications:   Dental Appliance: Dental Appliances: None  Vision: Visual Aid: Glasses  Hearing Aid:    Jewelry: Jewelry: None  Clothing: Clothing: None  Other Valuables: Other Valuables: None  Valuables sent to safe:      OR _________________________________________________________________________________________    There were no personal belongings, valuables or home medications left at patient's bedside,  or safe.

## 2020-08-07 NOTE — DISCHARGE SUMMARY
Hospitalist Discharge Summary     Patient ID:  Essence Alvarez  [id number]  60 y.o.  1960 7/23/2020    PCP on record: Huber Amaro NP    Admit date: 7/23/2020  Discharge date and time: 8/7/2020    DISCHARGE DIAGNOSIS:  See below    CONSULTATIONS:  IP CONSULT TO HOSPITALIST  IP CONSULT TO INTENSIVIST    Excerpted HPI from H&P of Niya Rodriguez MD:  Baseline does not wear home oxygen   Denies prior history of CHF, CAD or MI  Admits to chronic dyspnea on exertion at baseline  Does not walk much due to \"torn cartilage\" in my right knee              Family assists her ambulating around the house  ______________________________________________________________________  DISCHARGE SUMMARY/HOSPITAL COURSE:  for full details see H&P, daily progress notes, labs, consult notes. Acute respiratory failure with hypoxia and hypercarbia POA s/p intubation   Probable acute on chronic diastolic CHF POA LVEF 20-00%  ? Acute bronchitis POA   Underlying JOSLYN or OHS POA with Body mass index is 58.58 kg/m². Metabolic encephalopathy POA due to hypercarbia 7/24  COVID-19 rule out POA testing negative x 2  metabolic alkalosis , maybe contraction alkalosis   Baseline does not wear home o2, admits to chronic CHOPRA  Past few days worsening CHOPRA and SOB at rest  Positive LE edema and orthopnea  No fevers, positive dry cough  HCO3 elevated at admit 36, suspect some chronic CO2 retention at baseline  CXR with bilateral interstitial edema   pBNP 2,987  procalcitonin < 0.05  Differential; Acute left CHF, atypical pneumonia    extubated   No clear pneumonia, pulm added antibiotics, sputum culture showed MSSA   Will DC vanco and s/p cefipime , switched to cefazolin . Cefazolin DC as pt was noticed to have urticaria .switched to lvq . Pt had gotten 6days of IV abx so far , will  Continue one more day of levaquin and stop  S/p continuous BIPap, now on NC , prn BIPAP and  at bedtime   Prn lasix .  Repeat chest xray Transferred out of icu    a dose of diamox ordered   Pt found confused , although awake   Will get abg stat: repeat ABG showed PH 7.33 PCO2 77 bicarb 41      8/4:  CTA Head and Head CT was ordered overnight for change in mental status. Change was likely related to pCO2 retention. Pt was on bipap overnight and seems to be doing well this morning. Currently on 3L NC. Pulmonary evals on going.     8/5:  Plan for discharge to SNF later this week. Continue bipap at night, wean O2 as tolerated  Pulmonary eval appreciated  Lasix dose restarted     8/6:  Continue bipap at night, wean O2 as tolerated  GYN follow up at discharge  Hopeful to DC to SNF tomorrow  Continue diuresis     8/7:  Pt stable for discharge to SNF. Continue O2 - wean as per SNF protocol. Bipap overnight. GYN follow up on discharge    Urticarial rash , allergic reaction? - improved  Diffuse itchy urticareal rash since abx switched to cefazolin   Will DC keflex , switch to levaquin   Prn benadryl   Will update allergy list      Afib with RVR - now rate controlled and SR  Initially on Amio drip which was weaned off  Now on BB   Will discharge on Eliquis given elevated CHADSVASC score   Cardiology follow up    Thrombocytopenia resolved   Essential HTN POA  Hyperlipidemia POA  Resume metoprolol and lipitor  LDL 55,      DM type 2 POA  Not currently on treatment  POC, SSI  HgBa1c 6.0%     Remote tobacco use quit 40 years ago  Impaired mobility POA due to DJD knees  PT/OT per SNF routine     Fungal rash in trunk skin folds POA  Topical nystatin  _______________________________________________________________________  Patient seen and examined by me on discharge day. Pertinent Findings:  Gen:    Not in distress  Chest: Clear lungs, on 3L NC  CVS:   Regular rhythm.   No edema  Abd:  Soft, not distended, not tender  Neuro:  Alert, oriented x3  _______________________________________________________________________  DISCHARGE MEDICATIONS:   Current Discharge Medication List      START taking these medications    Details   apixaban (Eliquis) 5 mg tablet Take 1 Tab by mouth two (2) times a day. Qty: 60 Tab, Refills: 0         CONTINUE these medications which have CHANGED    Details   furosemide (LASIX) 40 mg tablet Take 1 Tab by mouth daily. Qty: 30 Tab, Refills: 0         CONTINUE these medications which have NOT CHANGED    Details   atorvastatin (LIPITOR) 10 mg tablet Take 10 mg by mouth daily. metoprolol (LOPRESSOR) 25 mg tablet Take 25 mg by mouth two (2) times a day. STOP taking these medications       aspirin 81 mg chewable tablet Comments:   Reason for Stopping:                 Patient Follow Up Instructions: Activity: Activity as tolerated  Diet: Resume previous diet  Wound Care: None needed    Follow-up Information     Follow up With Specialties Details Why Claudine DAVIDSON [facility]  this is your rehab provider. [address] [place] [address] [address]   [facility] [address] [address]  [phone]    Refugio Machado MD Obstetrics & Gynecology, Gynecology, Obstetrics Go on 8/10/2020 arrive at 2:30 PM for 2:45 PM appt with OB/GYN d/t vaginal bleeding.  Please bring insurance card and photo ID.   [address] [address]  [phone]      Antwon Tripathi NP Nurse Practitioner   [address]  [address]  [phone]          ________________________________________________________________    Risk of deterioration: Moderate    Condition at Discharge:  Stable  __________________________________________________________________    Disposition  SNF/LTC    ____________________________________________________________________    Code Status: Full Code  ___________________________________________________________________      Total time in minutes spent coordinating this discharge (includes going over instructions, follow-up, prescriptions, and preparing report for sign off to her PCP) :  35 minutes    Signed:  Maureen Saez MD

## 2020-08-09 ENCOUNTER — PATIENT OUTREACH (OUTPATIENT)
Dept: CASE MANAGEMENT | Age: 60
End: 2020-08-09

## 2020-08-09 NOTE — PROGRESS NOTES
Community Care Team medical review documentation for patient in [facility]     Patient was discharged from [facility] on 8/7/20 to [address] [facility] (SNF). Information included in this progress note has been provided to SNF. Discharge Diagnosis: COVID-19 Rule Out, Acute Respiratory Failure with intubation. CHF LVEF 50-55%, JOSLYN, Afib, DM  PCP : Raulito Poe NP  ACP:  Full Code   Medication Changes at Discharge:   Start: Elaquis 5 mg bid   Change: Lasix 40 mg daily   Stop: ASA 81 mg daily   Diet: DM/Cardiac   Follow up Appointment Recommendations:   OBGYN Juna Hamman on 8/10/2020 (arrive at 230 bring insurance card) - for vaginal bleeding  PCP - Remedios Linares NP - Needs appointment  Post SNF d/c for EDD[facility]TERFIELD follow up    Patient Active Problem List    Diagnosis Date Noted    COVID-19 ruled out 07/24/2020     PTA patient lives in [place] home iwth daughter who cooks and assist with ADL's. patient does not walk. Transfers from bed to wheelchair. Has BSC, cane, walker and wheelchair. Community Care Team will follow up weekly with David Pedrito until discharge. Medications were not reconciled and general patient assessment was not completed during this [facility] outreach.       Sarmad Saunders RN

## 2020-08-14 ENCOUNTER — APPOINTMENT (OUTPATIENT)
Dept: GENERAL RADIOLOGY | Age: 60
End: 2020-08-14
Attending: EMERGENCY MEDICINE
Payer: MEDICARE

## 2020-08-14 ENCOUNTER — APPOINTMENT (OUTPATIENT)
Dept: CT IMAGING | Age: 60
End: 2020-08-14
Attending: EMERGENCY MEDICINE
Payer: MEDICARE

## 2020-08-14 ENCOUNTER — HOSPITAL ENCOUNTER (EMERGENCY)
Age: 60
Discharge: NURSING, REHAB OR LONG-TERM CARE | End: 2020-08-14
Attending: EMERGENCY MEDICINE | Admitting: EMERGENCY MEDICINE
Payer: MEDICARE

## 2020-08-14 VITALS
HEART RATE: 81 BPM | SYSTOLIC BLOOD PRESSURE: 119 MMHG | DIASTOLIC BLOOD PRESSURE: 54 MMHG | BODY MASS INDEX: 51.91 KG/M2 | HEIGHT: 63 IN | WEIGHT: 293 LBS | OXYGEN SATURATION: 94 % | TEMPERATURE: 98.3 F | RESPIRATION RATE: 27 BRPM

## 2020-08-14 DIAGNOSIS — R06.02 SOB (SHORTNESS OF BREATH): ICD-10-CM

## 2020-08-14 DIAGNOSIS — J18.9 ATYPICAL PNEUMONIA: Primary | ICD-10-CM

## 2020-08-14 DIAGNOSIS — R07.89 ATYPICAL CHEST PAIN: ICD-10-CM

## 2020-08-14 LAB
ALBUMIN SERPL-MCNC: 2.4 G/DL (ref 3.5–5)
ALBUMIN/GLOB SERPL: 0.5 {RATIO} (ref 1.1–2.2)
ALP SERPL-CCNC: 103 U/L (ref 45–117)
ALT SERPL-CCNC: 30 U/L (ref 12–78)
ANION GAP SERPL CALC-SCNC: ABNORMAL MMOL/L (ref 5–15)
ARTERIAL PATENCY WRIST A: YES
AST SERPL-CCNC: 52 U/L (ref 15–37)
ATRIAL RATE: 77 BPM
BASE EXCESS BLD CALC-SCNC: 21 MMOL/L
BASOPHILS # BLD: 0 K/UL (ref 0–0.1)
BASOPHILS NFR BLD: 1 % (ref 0–1)
BDY SITE: ABNORMAL
BILIRUB SERPL-MCNC: 0.8 MG/DL (ref 0.2–1)
BUN SERPL-MCNC: 13 MG/DL (ref 6–20)
BUN/CREAT SERPL: 15 (ref 12–20)
CA-I BLD-SCNC: 1.24 MMOL/L (ref 1.12–1.32)
CALCIUM SERPL-MCNC: 9 MG/DL (ref 8.5–10.1)
CALCULATED R AXIS, ECG10: -32 DEGREES
CALCULATED T AXIS, ECG11: 9 DEGREES
CHLORIDE SERPL-SCNC: 98 MMOL/L (ref 97–108)
CO2 SERPL-SCNC: >45 MMOL/L (ref 21–32)
CREAT SERPL-MCNC: 0.87 MG/DL (ref 0.55–1.02)
DIAGNOSIS, 93000: NORMAL
DIFFERENTIAL METHOD BLD: ABNORMAL
EOSINOPHIL # BLD: 0.7 K/UL (ref 0–0.4)
EOSINOPHIL NFR BLD: 10 % (ref 0–7)
ERYTHROCYTE [DISTWIDTH] IN BLOOD BY AUTOMATED COUNT: 14.6 % (ref 11.5–14.5)
GAS FLOW.O2 O2 DELIVERY SYS: ABNORMAL L/MIN
GAS FLOW.O2 SETTING OXYMISER: 2 L/M
GLOBULIN SER CALC-MCNC: 4.7 G/DL (ref 2–4)
GLUCOSE SERPL-MCNC: 119 MG/DL (ref 65–100)
HCO3 BLD-SCNC: 46.6 MMOL/L (ref 22–26)
HCT VFR BLD AUTO: 44.4 % (ref 35–47)
HGB BLD-MCNC: 12.9 G/DL (ref 11.5–16)
IMM GRANULOCYTES # BLD AUTO: 0 K/UL (ref 0–0.04)
IMM GRANULOCYTES NFR BLD AUTO: 0 % (ref 0–0.5)
LYMPHOCYTES # BLD: 1.6 K/UL (ref 0.8–3.5)
LYMPHOCYTES NFR BLD: 25 % (ref 12–49)
MCH RBC QN AUTO: 30.7 PG (ref 26–34)
MCHC RBC AUTO-ENTMCNC: 29.1 G/DL (ref 30–36.5)
MCV RBC AUTO: 105.7 FL (ref 80–99)
MONOCYTES # BLD: 0.4 K/UL (ref 0–1)
MONOCYTES NFR BLD: 7 % (ref 5–13)
NEUTS SEG # BLD: 3.6 K/UL (ref 1.8–8)
NEUTS SEG NFR BLD: 58 % (ref 32–75)
NRBC # BLD: 0 K/UL (ref 0–0.01)
NRBC BLD-RTO: 0 PER 100 WBC
P-R INTERVAL, ECG05: 156 MS
PCO2 BLD: 88.5 MMHG (ref 35–45)
PH BLD: 7.33 [PH] (ref 7.35–7.45)
PLATELET # BLD AUTO: 126 K/UL (ref 150–400)
PO2 BLD: 108 MMHG (ref 80–100)
POTASSIUM SERPL-SCNC: 4.5 MMOL/L (ref 3.5–5.1)
PROT SERPL-MCNC: 7.1 G/DL (ref 6.4–8.2)
Q-T INTERVAL, ECG07: 400 MS
QRS DURATION, ECG06: 80 MS
QTC CALCULATION (BEZET), ECG08: 452 MS
RBC # BLD AUTO: 4.2 M/UL (ref 3.8–5.2)
SAO2 % BLD: 97 % (ref 92–97)
SODIUM SERPL-SCNC: 143 MMOL/L (ref 136–145)
SPECIMEN TYPE: ABNORMAL
TOTAL RESP. RATE, ITRR: 35
TROPONIN I SERPL-MCNC: <0.05 NG/ML
TROPONIN I SERPL-MCNC: <0.05 NG/ML
VENTRICULAR RATE, ECG03: 77 BPM
WBC # BLD AUTO: 6.3 K/UL (ref 3.6–11)

## 2020-08-14 PROCEDURE — 93005 ELECTROCARDIOGRAM TRACING: CPT

## 2020-08-14 PROCEDURE — 96374 THER/PROPH/DIAG INJ IV PUSH: CPT

## 2020-08-14 PROCEDURE — 71275 CT ANGIOGRAPHY CHEST: CPT

## 2020-08-14 PROCEDURE — 82803 BLOOD GASES ANY COMBINATION: CPT

## 2020-08-14 PROCEDURE — 74011636320 HC RX REV CODE- 636/320: Performed by: EMERGENCY MEDICINE

## 2020-08-14 PROCEDURE — 74011250636 HC RX REV CODE- 250/636: Performed by: EMERGENCY MEDICINE

## 2020-08-14 PROCEDURE — 36600 WITHDRAWAL OF ARTERIAL BLOOD: CPT

## 2020-08-14 PROCEDURE — 84484 ASSAY OF TROPONIN QUANT: CPT

## 2020-08-14 PROCEDURE — 80053 COMPREHEN METABOLIC PANEL: CPT

## 2020-08-14 PROCEDURE — 71045 X-RAY EXAM CHEST 1 VIEW: CPT

## 2020-08-14 PROCEDURE — 99285 EMERGENCY DEPT VISIT HI MDM: CPT

## 2020-08-14 PROCEDURE — 77010033678 HC OXYGEN DAILY

## 2020-08-14 PROCEDURE — 85025 COMPLETE CBC W/AUTO DIFF WBC: CPT

## 2020-08-14 PROCEDURE — 36415 COLL VENOUS BLD VENIPUNCTURE: CPT

## 2020-08-14 RX ORDER — NITROGLYCERIN 0.4 MG/1
0.4 TABLET SUBLINGUAL
COMMUNITY
End: 2020-11-06

## 2020-08-14 RX ORDER — NITROGLYCERIN 0.6 MG/1
0.6 TABLET SUBLINGUAL
COMMUNITY
End: 2020-08-14 | Stop reason: CLARIF

## 2020-08-14 RX ORDER — ACETAMINOPHEN 325 MG/1
650 TABLET ORAL
COMMUNITY

## 2020-08-14 RX ORDER — AZITHROMYCIN 250 MG/1
TABLET, FILM COATED ORAL
Qty: 6 TAB | Refills: 0 | Status: SHIPPED | OUTPATIENT
Start: 2020-08-14 | End: 2020-11-04

## 2020-08-14 RX ORDER — FUROSEMIDE 10 MG/ML
40 INJECTION INTRAMUSCULAR; INTRAVENOUS
Status: COMPLETED | OUTPATIENT
Start: 2020-08-14 | End: 2020-08-14

## 2020-08-14 RX ORDER — SODIUM CHLORIDE 0.9 % (FLUSH) 0.9 %
10 SYRINGE (ML) INJECTION
Status: COMPLETED | OUTPATIENT
Start: 2020-08-14 | End: 2020-08-14

## 2020-08-14 RX ADMIN — FUROSEMIDE 40 MG: 10 INJECTION, SOLUTION INTRAMUSCULAR; INTRAVENOUS at 18:24

## 2020-08-14 RX ADMIN — IOPAMIDOL 100 ML: 755 INJECTION, SOLUTION INTRAVENOUS at 15:42

## 2020-08-14 RX ADMIN — Medication 10 ML: at 15:42

## 2020-08-14 NOTE — DISCHARGE INSTRUCTIONS
Thank you for visiting our emergency department today. We all do hope that we were able to assist you in your emergent needs today. Please read over your discharge instructions as these contain pertinent information to help you in the healing process. These instructions include a list of prescriptions you were given today. Follow-up information is also noted on your discharge papers. There are attached instructions and information pertaining to the reason why you were seen in the emergency department today. These discharge instructions may not be for exactly why you were here, but may be the closest available instructions that we have. These include important advice for things that you can do at home to feel better, and reasons to return to the emergency department. The evaluation and treatment you received in the emergency department is not always definitive care. If follow-up with your primary care doctor or specialist was recommended, it is important that you make these appointments for follow-up care. You may need further testing, procedures, and/or medications to help you feel better. Further tests may be required that are not available in the emergency department. Failure to make these follow-up appointments may jeopardize your health. The emergency department is here for emergent stabilization and evaluation of life and limb threatening illness and/or injuries. Further care through a specialist or primary care doctor may be required to assist in your healing and complete your treatment and/or evaluation. We may not always be able to make a diagnosis in the emergency department, or things may change that will alter your diagnosis. Our primary goal is to ensure that nothing serious is occurring and that you are stable to continue your treatment and evaluation at home as an outpatient.   Of course, if things change, and you feel worse, you are always encouraged to return to the emergency department for re-evaluation. Lab Results Today:  Recent Results (from the past 8 hour(s))   CBC WITH AUTOMATED DIFF    Collection Time: 08/14/20 12:26 PM   Result Value Ref Range    WBC 6.3 3.6 - 11.0 K/uL    RBC 4.20 3.80 - 5.20 M/uL    HGB 12.9 11.5 - 16.0 g/dL    HCT 44.4 35.0 - 47.0 %    .7 (H) 80.0 - 99.0 FL    MCH 30.7 26.0 - 34.0 PG    MCHC 29.1 (L) 30.0 - 36.5 g/dL    RDW 14.6 (H) 11.5 - 14.5 %    PLATELET 931 (L) 737 - 400 K/uL    NRBC 0.0 0  WBC    ABSOLUTE NRBC 0.00 0.00 - 0.01 K/uL    NEUTROPHILS 58 32 - 75 %    LYMPHOCYTES 25 12 - 49 %    MONOCYTES 7 5 - 13 %    EOSINOPHILS 10 (H) 0 - 7 %    BASOPHILS 1 0 - 1 %    IMMATURE GRANULOCYTES 0 0.0 - 0.5 %    ABS. NEUTROPHILS 3.6 1.8 - 8.0 K/UL    ABS. LYMPHOCYTES 1.6 0.8 - 3.5 K/UL    ABS. MONOCYTES 0.4 0.0 - 1.0 K/UL    ABS. EOSINOPHILS 0.7 (H) 0.0 - 0.4 K/UL    ABS. BASOPHILS 0.0 0.0 - 0.1 K/UL    ABS. IMM. GRANS. 0.0 0.00 - 0.04 K/UL    DF AUTOMATED     METABOLIC PANEL, COMPREHENSIVE    Collection Time: 08/14/20 12:26 PM   Result Value Ref Range    Sodium 143 136 - 145 mmol/L    Potassium 4.5 3.5 - 5.1 mmol/L    Chloride 98 97 - 108 mmol/L    CO2 >45 (HH) 21 - 32 mmol/L    Anion gap Cannot be calculated 5 - 15 mmol/L    Glucose 119 (H) 65 - 100 mg/dL    BUN 13 6 - 20 MG/DL    Creatinine 0.87 0.55 - 1.02 MG/DL    BUN/Creatinine ratio 15 12 - 20      GFR est AA >60 >60 ml/min/1.73m2    GFR est non-AA >60 >60 ml/min/1.73m2    Calcium 9.0 8.5 - 10.1 MG/DL    Bilirubin, total 0.8 0.2 - 1.0 MG/DL    ALT (SGPT) 30 12 - 78 U/L    AST (SGOT) 52 (H) 15 - 37 U/L    Alk.  phosphatase 103 45 - 117 U/L    Protein, total 7.1 6.4 - 8.2 g/dL    Albumin 2.4 (L) 3.5 - 5.0 g/dL    Globulin 4.7 (H) 2.0 - 4.0 g/dL    A-G Ratio 0.5 (L) 1.1 - 2.2     TROPONIN I    Collection Time: 08/14/20 12:26 PM   Result Value Ref Range    Troponin-I, Qt. <0.05 <0.05 ng/mL   EKG, 12 LEAD, INITIAL    Collection Time: 08/14/20 12:34 PM   Result Value Ref Range    Ventricular Rate 77 BPM    Atrial Rate 77 BPM    P-R Interval 156 ms    QRS Duration 80 ms    Q-T Interval 400 ms    QTC Calculation (Bezet) 452 ms    Calculated R Axis -32 degrees    Calculated T Axis 9 degrees    Diagnosis       Sinus rhythm with premature atrial complexes  Left axis deviation    Confirmed by Jonathon Jones ([id number]) on 8/14/2020 4:53:19 PM     POC EG7    Collection Time: 08/14/20  2:07 PM   Result Value Ref Range    Calcium, ionized (POC) 1.24 1.12 - 1.32 mmol/L    pH (POC) 7.33 (L) 7.35 - 7.45      pCO2 (POC) 88.5 (H) 35.0 - 45.0 MMHG    pO2 (POC) 108 (H) 80 - 100 MMHG    HCO3 (POC) 46.6 (H) 22 - 26 MMOL/L    Base excess (POC) 21 mmol/L    sO2 (POC) 97 92 - 97 %    Site RIGHT RADIAL      Device: NASAL CANNULA      Flow rate (POC) 2 L/M    Allens test (POC) YES      Specimen type (POC) ARTERIAL      Total resp. rate 35     TROPONIN I    Collection Time: 08/14/20  2:24 PM   Result Value Ref Range    Troponin-I, Qt. <0.05 <0.05 ng/mL        Radiology Results Today:  Cta Chest W Or W Wo Cont    Result Date: 8/14/2020  IMPRESSION: 1. No definite pulmonary emboli identified. 2. There are mild areas of atelectasis/scarring in the lungs bilaterally with slight pleural thickening lung bases. 3. There are areas of groundglass opacity in the lungs most consistent with mosaic attenuation pattern which could represent small airways disease although atypical infection may have a similar appearance. [facility] Chest Port    Result Date: 8/14/2020  IMPRESSION: 1. There are vague scattered areas of opacity in the lungs right greater than left which are nonspecific and could atypical infection or atypical edema. Cardiomegaly appears stable.

## 2020-08-14 NOTE — ED TRIAGE NOTES
Pt in with ED from [address] [facility] c/o chest pain since 1000. Pt was given 3 nitros at facility and took pain level from 10/10 down to 2/10 according to EMS. Pt is 70% o2 on ra and tested negative for COVID on Wednesday but does have a history of respiratory failure. Pt monitored X3 and AOX4.

## 2020-08-14 NOTE — ED PROVIDER NOTES
EMERGENCY DEPARTMENT HISTORY AND PHYSICAL EXAM      Date: 8/14/2020  Patient Name: Valeri Gregory    History of Presenting Illness     No chief complaint on file. History Provided By: Patient and EMS    HPI: Valeri Gregory, 60 y.o. female  presents to the ED with cc of chest pain shortness of breath. Patient was sent from [facility]. Around 1030 this morning patient states she had an acute onset of chest pain. She also has shortness of breath. She denies any other symptoms such as a productive cough, nausea, vomiting, or diarrhea. No abdominal pain. She had a temperature of 99.3 prior to transfer. She has not had any subjective fevers or chills. No URI symptoms. She has chronic swelling in her legs but nothing worse than usual.  Patient is chronically on 3 L of oxygen. EMS states her oxygen saturations were in the 70s and this was on room air. She is currently on her 3 L without hypoxia in the emergency department. Patient was given 3 nitroglycerin prior to arrival which she states did help her chest pain. She however is still short of breath. Past History     Past Medical History:  Past Medical History:   Diagnosis Date    Diabetes ([address]     Hypertension     Ill-defined condition     high cholesterol    Ill-defined condition     tachycardia    Other and unspecified symptoms and signs involving general sensations and perceptions     ruptured disc       Past Surgical History:  No past surgical history on file. Medications:  No current facility-administered medications on file prior to encounter. Current Outpatient Medications on File Prior to Encounter   Medication Sig Dispense Refill    multivitamin, tx-iron-ca-min (THERA-M w/ IRON) 9 mg iron-400 mcg tab tablet Take 1 Tab by mouth daily.  acetaminophen (TYLENOL) 325 mg tablet Take 650 mg by mouth every six (6) hours as needed for Pain.       nitroglycerin (Nitrostat) 0.4 mg SL tablet 0.4 mg by SubLINGual route every five (5) minutes as needed for Chest Pain. Up to 3 doses.  furosemide (LASIX) 40 mg tablet Take 1 Tab by mouth daily. 30 Tab 0    apixaban (Eliquis) 5 mg tablet Take 1 Tab by mouth two (2) times a day. 60 Tab 0    atorvastatin (LIPITOR) 10 mg tablet Take 10 mg by mouth nightly.  metoprolol (LOPRESSOR) 25 mg tablet Take 25 mg by mouth two (2) times a day. Family History:  No family history on file. Social History:  Social History     Tobacco Use    Smoking status: Former Smoker   Substance Use Topics    Alcohol use: No    Drug use: No       Allergies: Allergies   Allergen Reactions    Cefazolin Rash    Other Plant, Animal, Environmental Rash     Ivory soap caused rash on hands. All the above components of the past  history are auto-populated from the electronic record. They have been reviewed and the patient has been interviewed for any pertinent past history that pertains to the patient's chief complaint and reason for visit. Not all pre-populated components may be accurate at the time this note was generated. Review of Systems   Review of Systems   Constitutional: Negative for chills and fever. HENT: Negative for congestion, ear pain, rhinorrhea, sore throat and trouble swallowing. Eyes: Negative for visual disturbance. Respiratory: Positive for shortness of breath. Negative for cough and chest tightness. Cardiovascular: Positive for chest pain. Negative for palpitations. Gastrointestinal: Negative for abdominal pain, blood in stool, constipation, diarrhea, nausea and vomiting. Genitourinary: Negative for decreased urine volume, difficulty urinating, dysuria and frequency. Musculoskeletal: Negative for back pain and neck pain. Skin: Negative for color change and rash. Neurological: Negative for dizziness, weakness, light-headedness and headaches. Physical Exam   Physical Exam  Vitals signs and nursing note reviewed.    Constitutional: General: She is not in acute distress. Appearance: She is well-developed. She is obese. She is not ill-appearing. Eyes:      Conjunctiva/sclera: Conjunctivae normal.   Neck:      Musculoskeletal: Neck supple. Cardiovascular:      Rate and Rhythm: Normal rate and regular rhythm. Pulmonary:      Effort: Pulmonary effort is normal. No accessory muscle usage or respiratory distress. Breath sounds: Normal breath sounds. Abdominal:      General: There is no distension. Palpations: Abdomen is soft. Tenderness: There is no abdominal tenderness. Musculoskeletal:      Right lower leg: Edema present. Left lower leg: Edema present. Lymphadenopathy:      Cervical: No cervical adenopathy. Skin:     General: Skin is warm and dry. Neurological:      Mental Status: She is alert and oriented to person, place, and time. Cranial Nerves: No cranial nerve deficit. Sensory: No sensory deficit. Diagnostic Study Results     Labs -     Recent Results (from the past 24 hour(s))   CBC WITH AUTOMATED DIFF    Collection Time: 08/14/20 12:26 PM   Result Value Ref Range    WBC 6.3 3.6 - 11.0 K/uL    RBC 4.20 3.80 - 5.20 M/uL    HGB 12.9 11.5 - 16.0 g/dL    HCT 44.4 35.0 - 47.0 %    .7 (H) 80.0 - 99.0 FL    MCH 30.7 26.0 - 34.0 PG    MCHC 29.1 (L) 30.0 - 36.5 g/dL    RDW 14.6 (H) 11.5 - 14.5 %    PLATELET 580 (L) 458 - 400 K/uL    NRBC 0.0 0  WBC    ABSOLUTE NRBC 0.00 0.00 - 0.01 K/uL    NEUTROPHILS 58 32 - 75 %    LYMPHOCYTES 25 12 - 49 %    MONOCYTES 7 5 - 13 %    EOSINOPHILS 10 (H) 0 - 7 %    BASOPHILS 1 0 - 1 %    IMMATURE GRANULOCYTES 0 0.0 - 0.5 %    ABS. NEUTROPHILS 3.6 1.8 - 8.0 K/UL    ABS. LYMPHOCYTES 1.6 0.8 - 3.5 K/UL    ABS. MONOCYTES 0.4 0.0 - 1.0 K/UL    ABS. EOSINOPHILS 0.7 (H) 0.0 - 0.4 K/UL    ABS. BASOPHILS 0.0 0.0 - 0.1 K/UL    ABS. IMM.  GRANS. 0.0 0.00 - 0.04 K/UL    DF AUTOMATED     METABOLIC PANEL, COMPREHENSIVE    Collection Time: 08/14/20 12:26 PM Result Value Ref Range    Sodium 143 136 - 145 mmol/L    Potassium 4.5 3.5 - 5.1 mmol/L    Chloride 98 97 - 108 mmol/L    CO2 >45 (HH) 21 - 32 mmol/L    Anion gap Cannot be calculated 5 - 15 mmol/L    Glucose 119 (H) 65 - 100 mg/dL    BUN 13 6 - 20 MG/DL    Creatinine 0.87 0.55 - 1.02 MG/DL    BUN/Creatinine ratio 15 12 - 20      GFR est AA >60 >60 ml/min/1.73m2    GFR est non-AA >60 >60 ml/min/1.73m2    Calcium 9.0 8.5 - 10.1 MG/DL    Bilirubin, total 0.8 0.2 - 1.0 MG/DL    ALT (SGPT) 30 12 - 78 U/L    AST (SGOT) 52 (H) 15 - 37 U/L    Alk. phosphatase 103 45 - 117 U/L    Protein, total 7.1 6.4 - 8.2 g/dL    Albumin 2.4 (L) 3.5 - 5.0 g/dL    Globulin 4.7 (H) 2.0 - 4.0 g/dL    A-G Ratio 0.5 (L) 1.1 - 2.2     TROPONIN I    Collection Time: 08/14/20 12:26 PM   Result Value Ref Range    Troponin-I, Qt. <0.05 <0.05 ng/mL   EKG, 12 LEAD, INITIAL    Collection Time: 08/14/20 12:34 PM   Result Value Ref Range    Ventricular Rate 77 BPM    Atrial Rate 77 BPM    P-R Interval 156 ms    QRS Duration 80 ms    Q-T Interval 400 ms    QTC Calculation (Bezet) 452 ms    Calculated R Axis -32 degrees    Calculated T Axis 9 degrees    Diagnosis       Sinus rhythm with premature atrial complexes  Left axis deviation    Confirmed by Rosita Sears ([id number]) on 8/14/2020 4:53:19 PM     POC EG7    Collection Time: 08/14/20  2:07 PM   Result Value Ref Range    Calcium, ionized (POC) 1.24 1.12 - 1.32 mmol/L    pH (POC) 7.33 (L) 7.35 - 7.45      pCO2 (POC) 88.5 (H) 35.0 - 45.0 MMHG    pO2 (POC) 108 (H) 80 - 100 MMHG    HCO3 (POC) 46.6 (H) 22 - 26 MMOL/L    Base excess (POC) 21 mmol/L    sO2 (POC) 97 92 - 97 %    Site RIGHT RADIAL      Device: NASAL CANNULA      Flow rate (POC) 2 L/M    Allens test (POC) YES      Specimen type (POC) ARTERIAL      Total resp.  rate 35     TROPONIN I    Collection Time: 08/14/20  2:24 PM   Result Value Ref Range    Troponin-I, Qt. <0.05 <0.05 ng/mL       Radiologic Studies -   XR CHEST PORT   Final Result IMPRESSION:    1. There are vague scattered areas of opacity in the lungs right greater than   left which are nonspecific and could atypical infection or atypical edema. Cardiomegaly appears stable. CTA CHEST W OR W WO CONT   Final Result   IMPRESSION:    1. No definite pulmonary emboli identified. 2. There are mild areas of atelectasis/scarring in the lungs bilaterally with   slight pleural thickening lung bases. 3. There are areas of groundglass opacity in the lungs most consistent with   mosaic attenuation pattern which could represent small airways disease although   atypical infection may have a similar appearance. .           CT Results  (Last 48 hours)               08/14/20 1541  CTA CHEST W OR W WO CONT Final result    Impression:  IMPRESSION:    1. No definite pulmonary emboli identified. 2. There are mild areas of atelectasis/scarring in the lungs bilaterally with   slight pleural thickening lung bases. 3. There are areas of groundglass opacity in the lungs most consistent with   mosaic attenuation pattern which could represent small airways disease although   atypical infection may have a similar appearance. .           Narrative:  INDICATION:   chest pain, dyspnea       COMPARISON: None. TECHNIQUE:    Precontrast  images were obtained to localize the volume for acquisition. Multislice helical CT arteriography was performed from the diaphragm to the   thoracic inlet during uneventful rapid bolus intravenous administration of 100   mL of Isovue-370. Lung and soft tissue windows were generated. Post processing   was performed and coronal reformatted images were also generated. 3 D imaging   was performed. CT dose reduction was achieved through use of a standardized   protocol tailored for this examination and automatic exposure control for dose   modulation. FINDINGS:   There are mild bilateral areas of atelectasis/scarring in the lungs.        There also areas of groundglass opacity with a mosaic attenuation pattern could   represent small airways disease versus infectious/inflammatory process. No definite pulmonary emboli are identified. There is mild cardiomegaly   There is no mediastinal or hilar adenopathy or mass. The aorta enhances normally   without evidence of aneurysm or dissection. The visualized portions of the upper abdominal organs are normal.               CXR Results  (Last 48 hours)               08/14/20 1640  XR CHEST PORT Final result    Impression:  IMPRESSION:    1. There are vague scattered areas of opacity in the lungs right greater than   left which are nonspecific and could atypical infection or atypical edema. Cardiomegaly appears stable. Narrative:  EXAM:  XR CHEST PORT       INDICATION:  dyspnea       COMPARISON:  None. FINDINGS: A portable AP radiograph of the chest was obtained at 1614 hours. The   patient is on a cardiac monitor. There are scattered areas of pulmonary opacity   right greater than left. There is mild left basilar atelectasis. .  Cardiomegaly   is stable. .  Bony structures are unchanged. Medical Decision Making     I reviewed the vital signs, available nursing notes, past medical history, past surgical history, family history and social history. Vital Signs-Reviewed the patient's vital signs. Patient Vitals for the past 24 hrs:   Temp Pulse Resp BP SpO2   08/14/20 1415  75 (!) 32 127/64 99 %   08/14/20 1348 98.3 °F (36.8 °C) 73      08/14/20 1345  75 21 135/56 97 %   08/14/20 1315  74 18 130/54 93 %   08/14/20 1245  74 (!) 36 124/65 98 %   08/14/20 1230  91 29 127/61 99 %   08/14/20 1217     (!) 72 %   08/14/20 1216 98.3 °F (36.8 °C) 73 20 135/56 96 %         Records Reviewed: Nursing notes for today's visit have been reviewed. I have also reviewed most recent medical records pertinent to today's complaints, if available in our medical record system.   I have also reviewed all labs and imaging results from previous results in comparison to results obtained today. If an EKG was obtained today, it has been compared to previous EKGs, if available. If arriving via EMS, the EMS report has been reviewed if made available to us within the patient's time in the emergency department. Provider Notes (Medical Decision Making):   Patient presents from nursing home for shortness of breath and acute onset chest pain. EKG is nonischemic and 2 sets of cardiac enzymes were negative. CTA imaging of the chest was obtained to rule out pulmonary embolism considering the patient's recent hospitalization. This did not show any pulmonary embolism but there is atypical pattern consistent with an atypical pneumonia or edema. Patient is not hypoxic on her home oxygen requirement. I will give her a dose of IV Lasix to diurese her. She did have a very low-grade fever prior to arrival.  Given recent admission to the hospital will also cover her for an atypical pneumonia. She did have COVID-19 testing performed yesterday that was negative reportedly. Currently I do believe it is okay for her to go back to her nursing facility. May return if worse. Follow-up with the facility primary care doctor in 2 days. ED Course:   Initial assessment performed. The patients presenting problems have been discussed, and they are in agreement with the care plan formulated and outlined with them. I have encouraged them to ask questions as they arise throughout their visit.          Orders Placed This Encounter    XR CHEST PORT    CTA CHEST W OR W WO CONT    CBC WITH AUTOMATED DIFF    METABOLIC PANEL, COMPREHENSIVE    TROPONIN I    BLOOD GAS, ARTERIAL    TROPONIN I    POC EG7    EKG NOTEWRITER(ASAP ONLY)    OXIMETRY, SPOT CHECK    OXYGEN CANNULA Liters per minute: 2; Indications for O2 therapy: HYPOXIA CONTINUOUS STAT    EKG 12 LEAD INITIAL    INSERT PERIPHERAL IV ONE TIME STAT    multivitamin, tx-iron-ca-min (THERA-M w/ IRON) 9 mg iron-400 mcg tab tablet    DISCONTD: nitroglycerin (NITROQUICK) 0.6 mg SL tablet    acetaminophen (TYLENOL) 325 mg tablet    nitroglycerin (Nitrostat) 0.4 mg SL tablet    iopamidoL (ISOVUE-370) 76 % injection 100 mL    sodium chloride (NS) flush 10 mL    azithromycin (Zithromax Z-Andrés) 250 mg tablet    furosemide (LASIX) injection 40 mg       EKG    Date/Time: 8/14/2020 12:34 PM  Performed by: Donte Cr MD  Authorized by: Donte Cr MD     ECG reviewed by ED Physician in the absence of a cardiologist: yes    Rate:     ECG rate:  77    ECG rate assessment: normal    Rhythm:     Rhythm: sinus rhythm    Ectopy:     Ectopy: PAC    QRS:     QRS axis:  Left  Conduction:     Conduction: normal    ST segments:     ST segments:  Normal  T waves:     T waves: non-specific            Critical Care Time:   0    Disposition:  Discharge    The patient's emergency department evaluation is now complete. I have reviewed all labs, imaging, and pertinent information. I have discussed all results with the patient and/or family. Based on our evaluation today I do believe that the patient is safe to be discharged home. The patient has been provided with at home instructions that are pertinent to their complaint today, although these may not be specific to the exact diagnosis. I have reviewed the patient's home medications and attempted to reconcile if not already done so by pharmacy or nursing staff. I have discussed all new prescriptions with the patient. The patient has been encouraged to follow-up with primary care doctor and/or specialist, and these have been discussed with the patient. The patient has been advised that they may return to the emergency department if they have any worsening symptoms and or new symptoms that are of concern to them.   Verbal discharge instructions may have also been provided to the patient that may not be specifically contained in the written discharge instructions. The patient has been given opportunity to ask questions prior to discharge. PLAN:  1. Current Discharge Medication List      START taking these medications    Details   azithromycin (Zithromax Z-Andrés) 250 mg tablet Z-Andrés use as directed  Qty: 6 Tab, Refills: 0    Associated Diagnoses: Atypical pneumonia           2. Follow-up Information     Follow up With Specialties Details Why Contact Info    Cassie Frances MD Internal Medicine Schedule an appointment as soon as possible for a visit in 2 days  [address] [place] [address]   [address] [address]  [place] [address]  [phone]          Return to ED if worse     Diagnosis     Clinical Impression:   1. Atypical pneumonia    2. Atypical chest pain    3. SOB (shortness of breath)            This note will not be viewable in MyChart.

## 2020-08-14 NOTE — PROGRESS NOTES
Pharmacy Clarification of Prior to Admission Medication Regimen     The patient was not interviewed regarding clarification of the prior to admission medication regimen. Patient came from [facility], obtained medication history through Order Summary Report from facility. Information Obtained From: Transfer Papers    Pertinent Pharmacy Findings:  Updated patients preferred outpatient pharmacy to: N/A, Patient currently at [facility]    PTA medication list was corrected to the following:     Prior to Admission Medications   Prescriptions Last Dose Informant Taking?   acetaminophen (TYLENOL) 325 mg tablet  Transfer Papers Yes   Sig: Take 650 mg by mouth every six (6) hours as needed for Pain. apixaban (Eliquis) 5 mg tablet 2020 at Unknown time Transfer Papers Yes   Sig: Take 1 Tab by mouth two (2) times a day. atorvastatin (LIPITOR) 10 mg tablet 2020 at Unknown time Transfer Papers Yes   Sig: Take 10 mg by mouth nightly. furosemide (LASIX) 40 mg tablet 2020 at Unknown time Transfer Papers Yes   Sig: Take 1 Tab by mouth daily. metoprolol (LOPRESSOR) 25 mg tablet 2020 at Unknown time Transfer Papers Yes   Sig: Take 25 mg by mouth two (2) times a day. multivitamin, tx-iron-ca-min (THERA-M w/ IRON) 9 mg iron-400 mcg tab tablet 2020 at Unknown time Transfer Papers Yes   Sig: Take 1 Tab by mouth daily. nitroglycerin (Nitrostat) 0.4 mg SL tablet  Transfer Papers Yes   Si.4 mg by SubLINGual route every five (5) minutes as needed for Chest Pain. Up to 3 doses.       Facility-Administered Medications: None          Thank you,  Rosita SCHMIDT Do, CPhT  Medication History Technician

## 2020-08-27 ENCOUNTER — PATIENT OUTREACH (OUTPATIENT)
Dept: CASE MANAGEMENT | Age: 60
End: 2020-08-27

## 2020-08-27 ENCOUNTER — HOME HEALTH ADMISSION (OUTPATIENT)
Dept: HOME HEALTH SERVICES | Facility: HOME HEALTH | Age: 60
End: 2020-08-27
Payer: MEDICARE

## 2020-08-27 NOTE — PROGRESS NOTES
Community Care Team Documentation for Patient in [facility]  Subsequent Follow up     Patient remains at [address] ([facility]). See previous [facility] Team notes. Spoke with SNF team. Bed mobility CG assist, Transfers min - mod assist with sliding board, unable to amb, UB min assist, LB max assist, toileting max assist     Medications were not reconciled and general patient assessment was not completed during this skilled nursing facility outreach.      Vinicius Harry RN  [phone]

## 2020-08-28 ENCOUNTER — HOME CARE VISIT (OUTPATIENT)
Dept: HOME HEALTH SERVICES | Facility: HOME HEALTH | Age: 60
End: 2020-08-28

## 2020-09-01 ENCOUNTER — HOME CARE VISIT (OUTPATIENT)
Dept: SCHEDULING | Facility: HOME HEALTH | Age: 60
End: 2020-09-01
Payer: MEDICARE

## 2020-09-01 VITALS
HEART RATE: 86 BPM | RESPIRATION RATE: 18 BRPM | DIASTOLIC BLOOD PRESSURE: 78 MMHG | WEIGHT: 293 LBS | BODY MASS INDEX: 51.91 KG/M2 | SYSTOLIC BLOOD PRESSURE: 142 MMHG | HEIGHT: 63 IN | TEMPERATURE: 98.2 F | OXYGEN SATURATION: 93 %

## 2020-09-01 PROCEDURE — 400013 HH SOC

## 2020-09-01 PROCEDURE — G0299 HHS/HOSPICE OF RN EA 15 MIN: HCPCS

## 2020-09-01 PROCEDURE — 3331090001 HH PPS REVENUE CREDIT

## 2020-09-01 PROCEDURE — 3331090002 HH PPS REVENUE DEBIT

## 2020-09-02 ENCOUNTER — HOME CARE VISIT (OUTPATIENT)
Dept: SCHEDULING | Facility: HOME HEALTH | Age: 60
End: 2020-09-02
Payer: MEDICARE

## 2020-09-02 ENCOUNTER — PATIENT OUTREACH (OUTPATIENT)
Dept: CASE MANAGEMENT | Age: 60
End: 2020-09-02

## 2020-09-02 PROCEDURE — 3331090002 HH PPS REVENUE DEBIT

## 2020-09-02 PROCEDURE — G0151 HHCP-SERV OF PT,EA 15 MIN: HCPCS

## 2020-09-02 PROCEDURE — 3331090001 HH PPS REVENUE CREDIT

## 2020-09-03 ENCOUNTER — HOME CARE VISIT (OUTPATIENT)
Dept: HOME HEALTH SERVICES | Facility: HOME HEALTH | Age: 60
End: 2020-09-03
Payer: MEDICARE

## 2020-09-03 ENCOUNTER — HOME CARE VISIT (OUTPATIENT)
Dept: SCHEDULING | Facility: HOME HEALTH | Age: 60
End: 2020-09-03
Payer: MEDICARE

## 2020-09-03 VITALS
SYSTOLIC BLOOD PRESSURE: 145 MMHG | HEART RATE: 77 BPM | DIASTOLIC BLOOD PRESSURE: 82 MMHG | TEMPERATURE: 98 F | OXYGEN SATURATION: 94 %

## 2020-09-03 PROCEDURE — 3331090001 HH PPS REVENUE CREDIT

## 2020-09-03 PROCEDURE — 3331090002 HH PPS REVENUE DEBIT

## 2020-09-03 PROCEDURE — G0152 HHCP-SERV OF OT,EA 15 MIN: HCPCS

## 2020-09-04 ENCOUNTER — HOME CARE VISIT (OUTPATIENT)
Dept: SCHEDULING | Facility: HOME HEALTH | Age: 60
End: 2020-09-04
Payer: MEDICARE

## 2020-09-04 PROCEDURE — 3331090002 HH PPS REVENUE DEBIT

## 2020-09-04 PROCEDURE — 3331090001 HH PPS REVENUE CREDIT

## 2020-09-04 PROCEDURE — G0299 HHS/HOSPICE OF RN EA 15 MIN: HCPCS

## 2020-09-05 PROCEDURE — 3331090002 HH PPS REVENUE DEBIT

## 2020-09-05 PROCEDURE — 3331090001 HH PPS REVENUE CREDIT

## 2020-09-06 VITALS
WEIGHT: 293 LBS | HEART RATE: 94 BPM | RESPIRATION RATE: 16 BRPM | OXYGEN SATURATION: 94 % | TEMPERATURE: 98.1 F | SYSTOLIC BLOOD PRESSURE: 158 MMHG | DIASTOLIC BLOOD PRESSURE: 82 MMHG | BODY MASS INDEX: 51.91 KG/M2 | HEIGHT: 63 IN

## 2020-09-06 PROCEDURE — 3331090002 HH PPS REVENUE DEBIT

## 2020-09-06 PROCEDURE — 3331090001 HH PPS REVENUE CREDIT

## 2020-09-07 PROCEDURE — 3331090002 HH PPS REVENUE DEBIT

## 2020-09-07 PROCEDURE — 3331090001 HH PPS REVENUE CREDIT

## 2020-09-08 ENCOUNTER — HOME CARE VISIT (OUTPATIENT)
Dept: SCHEDULING | Facility: HOME HEALTH | Age: 60
End: 2020-09-08
Payer: MEDICARE

## 2020-09-08 PROCEDURE — 3331090001 HH PPS REVENUE CREDIT

## 2020-09-08 PROCEDURE — G0300 HHS/HOSPICE OF LPN EA 15 MIN: HCPCS

## 2020-09-08 PROCEDURE — 3331090002 HH PPS REVENUE DEBIT

## 2020-09-09 ENCOUNTER — HOME CARE VISIT (OUTPATIENT)
Dept: SCHEDULING | Facility: HOME HEALTH | Age: 60
End: 2020-09-09
Payer: MEDICARE

## 2020-09-09 VITALS
DIASTOLIC BLOOD PRESSURE: 80 MMHG | HEART RATE: 102 BPM | TEMPERATURE: 97.1 F | OXYGEN SATURATION: 96 % | RESPIRATION RATE: 20 BRPM | SYSTOLIC BLOOD PRESSURE: 140 MMHG

## 2020-09-09 PROCEDURE — 3331090001 HH PPS REVENUE CREDIT

## 2020-09-09 PROCEDURE — 3331090002 HH PPS REVENUE DEBIT

## 2020-09-10 ENCOUNTER — HOME CARE VISIT (OUTPATIENT)
Dept: SCHEDULING | Facility: HOME HEALTH | Age: 60
End: 2020-09-10
Payer: MEDICARE

## 2020-09-10 VITALS
HEART RATE: 80 BPM | TEMPERATURE: 97 F | OXYGEN SATURATION: 95 % | RESPIRATION RATE: 14 BRPM | DIASTOLIC BLOOD PRESSURE: 64 MMHG | SYSTOLIC BLOOD PRESSURE: 128 MMHG

## 2020-09-10 PROCEDURE — G0300 HHS/HOSPICE OF LPN EA 15 MIN: HCPCS

## 2020-09-10 PROCEDURE — 3331090002 HH PPS REVENUE DEBIT

## 2020-09-10 PROCEDURE — 3331090001 HH PPS REVENUE CREDIT

## 2020-09-11 ENCOUNTER — HOME CARE VISIT (OUTPATIENT)
Dept: SCHEDULING | Facility: HOME HEALTH | Age: 60
End: 2020-09-11
Payer: MEDICARE

## 2020-09-11 PROCEDURE — 3331090001 HH PPS REVENUE CREDIT

## 2020-09-11 PROCEDURE — 3331090002 HH PPS REVENUE DEBIT

## 2020-09-11 NOTE — PROGRESS NOTES
Late chart  Community Care Team Documentation for Patient in [facility]  Discharge Note    Patient has been discharged from [address] ([facility]). See previous [facility] Team notes. PCP : Edson Mayers, NP      Spoke with SNF team. Patient discharged to home on 8/28 due to copay. Discharged with [facility]. Community Care Team will sign off at this time. Medications were not reconciled and general patient assessment was not completed during this skilled nursing facility outreach.      Jeronimo Naik RN

## 2020-09-12 PROCEDURE — 3331090002 HH PPS REVENUE DEBIT

## 2020-09-12 PROCEDURE — 3331090001 HH PPS REVENUE CREDIT

## 2020-09-13 PROCEDURE — 3331090001 HH PPS REVENUE CREDIT

## 2020-09-13 PROCEDURE — 3331090002 HH PPS REVENUE DEBIT

## 2020-09-14 ENCOUNTER — HOME CARE VISIT (OUTPATIENT)
Dept: SCHEDULING | Facility: HOME HEALTH | Age: 60
End: 2020-09-14
Payer: MEDICARE

## 2020-09-14 PROCEDURE — 3331090001 HH PPS REVENUE CREDIT

## 2020-09-14 PROCEDURE — 3331090002 HH PPS REVENUE DEBIT

## 2020-09-14 PROCEDURE — G0151 HHCP-SERV OF PT,EA 15 MIN: HCPCS

## 2020-09-15 ENCOUNTER — HOME CARE VISIT (OUTPATIENT)
Dept: SCHEDULING | Facility: HOME HEALTH | Age: 60
End: 2020-09-15
Payer: MEDICARE

## 2020-09-15 PROCEDURE — 3331090002 HH PPS REVENUE DEBIT

## 2020-09-15 PROCEDURE — G0300 HHS/HOSPICE OF LPN EA 15 MIN: HCPCS

## 2020-09-15 PROCEDURE — 3331090001 HH PPS REVENUE CREDIT

## 2020-09-16 VITALS
TEMPERATURE: 97.3 F | DIASTOLIC BLOOD PRESSURE: 78 MMHG | OXYGEN SATURATION: 96 % | SYSTOLIC BLOOD PRESSURE: 124 MMHG | HEART RATE: 84 BPM

## 2020-09-16 PROCEDURE — 3331090001 HH PPS REVENUE CREDIT

## 2020-09-16 PROCEDURE — 3331090002 HH PPS REVENUE DEBIT

## 2020-09-17 PROCEDURE — 3331090001 HH PPS REVENUE CREDIT

## 2020-09-17 PROCEDURE — 3331090002 HH PPS REVENUE DEBIT

## 2020-09-18 PROCEDURE — 3331090002 HH PPS REVENUE DEBIT

## 2020-09-18 PROCEDURE — 3331090001 HH PPS REVENUE CREDIT

## 2020-09-19 PROCEDURE — 3331090002 HH PPS REVENUE DEBIT

## 2020-09-19 PROCEDURE — 3331090001 HH PPS REVENUE CREDIT

## 2020-09-20 VITALS
SYSTOLIC BLOOD PRESSURE: 110 MMHG | TEMPERATURE: 97.9 F | WEIGHT: 293 LBS | HEART RATE: 68 BPM | BODY MASS INDEX: 54.74 KG/M2 | RESPIRATION RATE: 19 BRPM | OXYGEN SATURATION: 94 % | DIASTOLIC BLOOD PRESSURE: 68 MMHG

## 2020-09-20 PROCEDURE — 3331090002 HH PPS REVENUE DEBIT

## 2020-09-20 PROCEDURE — 3331090001 HH PPS REVENUE CREDIT

## 2020-09-21 ENCOUNTER — HOME CARE VISIT (OUTPATIENT)
Dept: SCHEDULING | Facility: HOME HEALTH | Age: 60
End: 2020-09-21
Payer: MEDICARE

## 2020-09-21 VITALS
DIASTOLIC BLOOD PRESSURE: 62 MMHG | SYSTOLIC BLOOD PRESSURE: 108 MMHG | HEART RATE: 84 BPM | OXYGEN SATURATION: 93 % | RESPIRATION RATE: 18 BRPM | TEMPERATURE: 97.6 F

## 2020-09-21 PROCEDURE — 3331090001 HH PPS REVENUE CREDIT

## 2020-09-21 PROCEDURE — 3331090002 HH PPS REVENUE DEBIT

## 2020-09-21 PROCEDURE — G0299 HHS/HOSPICE OF RN EA 15 MIN: HCPCS

## 2020-09-22 ENCOUNTER — HOME CARE VISIT (OUTPATIENT)
Dept: HOME HEALTH SERVICES | Facility: HOME HEALTH | Age: 60
End: 2020-09-22
Payer: MEDICARE

## 2020-09-22 PROCEDURE — 3331090002 HH PPS REVENUE DEBIT

## 2020-09-22 PROCEDURE — 3331090001 HH PPS REVENUE CREDIT

## 2020-09-23 PROCEDURE — 3331090001 HH PPS REVENUE CREDIT

## 2020-09-23 PROCEDURE — 3331090002 HH PPS REVENUE DEBIT

## 2020-09-24 PROCEDURE — 3331090002 HH PPS REVENUE DEBIT

## 2020-09-24 PROCEDURE — 3331090001 HH PPS REVENUE CREDIT

## 2020-09-25 PROCEDURE — 3331090001 HH PPS REVENUE CREDIT

## 2020-09-25 PROCEDURE — 3331090002 HH PPS REVENUE DEBIT

## 2020-09-26 PROCEDURE — 3331090002 HH PPS REVENUE DEBIT

## 2020-09-26 PROCEDURE — 3331090001 HH PPS REVENUE CREDIT

## 2020-09-27 PROCEDURE — 3331090001 HH PPS REVENUE CREDIT

## 2020-09-27 PROCEDURE — 3331090002 HH PPS REVENUE DEBIT

## 2020-09-28 PROCEDURE — 3331090002 HH PPS REVENUE DEBIT

## 2020-09-28 PROCEDURE — 3331090001 HH PPS REVENUE CREDIT

## 2020-09-29 ENCOUNTER — HOME CARE VISIT (OUTPATIENT)
Dept: SCHEDULING | Facility: HOME HEALTH | Age: 60
End: 2020-09-29
Payer: MEDICARE

## 2020-09-29 PROCEDURE — 3331090001 HH PPS REVENUE CREDIT

## 2020-09-29 PROCEDURE — 3331090002 HH PPS REVENUE DEBIT

## 2020-09-29 PROCEDURE — G0299 HHS/HOSPICE OF RN EA 15 MIN: HCPCS

## 2020-09-30 VITALS
SYSTOLIC BLOOD PRESSURE: 128 MMHG | HEART RATE: 86 BPM | OXYGEN SATURATION: 94 % | TEMPERATURE: 98 F | DIASTOLIC BLOOD PRESSURE: 80 MMHG | RESPIRATION RATE: 20 BRPM

## 2020-09-30 PROCEDURE — 3331090002 HH PPS REVENUE DEBIT

## 2020-09-30 PROCEDURE — 3331090001 HH PPS REVENUE CREDIT

## 2020-10-01 PROCEDURE — 3331090001 HH PPS REVENUE CREDIT

## 2020-10-01 PROCEDURE — 3331090002 HH PPS REVENUE DEBIT

## 2020-10-02 PROCEDURE — 3331090002 HH PPS REVENUE DEBIT

## 2020-10-02 PROCEDURE — 3331090001 HH PPS REVENUE CREDIT

## 2020-10-03 PROCEDURE — 3331090002 HH PPS REVENUE DEBIT

## 2020-10-03 PROCEDURE — 3331090001 HH PPS REVENUE CREDIT

## 2020-10-04 PROCEDURE — 3331090001 HH PPS REVENUE CREDIT

## 2020-10-04 PROCEDURE — 3331090002 HH PPS REVENUE DEBIT

## 2020-10-05 PROCEDURE — 3331090001 HH PPS REVENUE CREDIT

## 2020-10-05 PROCEDURE — 3331090002 HH PPS REVENUE DEBIT

## 2020-10-06 ENCOUNTER — HOME CARE VISIT (OUTPATIENT)
Dept: SCHEDULING | Facility: HOME HEALTH | Age: 60
End: 2020-10-06
Payer: MEDICARE

## 2020-10-06 VITALS
TEMPERATURE: 97.8 F | DIASTOLIC BLOOD PRESSURE: 80 MMHG | HEART RATE: 88 BPM | OXYGEN SATURATION: 98 % | SYSTOLIC BLOOD PRESSURE: 120 MMHG

## 2020-10-06 PROCEDURE — 3331090002 HH PPS REVENUE DEBIT

## 2020-10-06 PROCEDURE — 3331090001 HH PPS REVENUE CREDIT

## 2020-10-06 PROCEDURE — G0300 HHS/HOSPICE OF LPN EA 15 MIN: HCPCS

## 2020-10-06 PROCEDURE — 400013 HH SOC

## 2020-10-07 PROCEDURE — 3331090002 HH PPS REVENUE DEBIT

## 2020-10-07 PROCEDURE — 3331090001 HH PPS REVENUE CREDIT

## 2020-10-08 PROCEDURE — 3331090002 HH PPS REVENUE DEBIT

## 2020-10-08 PROCEDURE — 3331090001 HH PPS REVENUE CREDIT

## 2020-10-09 PROCEDURE — 3331090002 HH PPS REVENUE DEBIT

## 2020-10-09 PROCEDURE — 3331090001 HH PPS REVENUE CREDIT

## 2020-10-10 PROCEDURE — 3331090001 HH PPS REVENUE CREDIT

## 2020-10-10 PROCEDURE — 3331090002 HH PPS REVENUE DEBIT

## 2020-10-11 PROCEDURE — 3331090002 HH PPS REVENUE DEBIT

## 2020-10-11 PROCEDURE — 3331090001 HH PPS REVENUE CREDIT

## 2020-10-12 PROCEDURE — 3331090002 HH PPS REVENUE DEBIT

## 2020-10-12 PROCEDURE — 3331090001 HH PPS REVENUE CREDIT

## 2020-10-13 ENCOUNTER — HOME CARE VISIT (OUTPATIENT)
Dept: SCHEDULING | Facility: HOME HEALTH | Age: 60
End: 2020-10-13
Payer: MEDICARE

## 2020-10-13 VITALS
DIASTOLIC BLOOD PRESSURE: 80 MMHG | TEMPERATURE: 97.8 F | OXYGEN SATURATION: 95 % | SYSTOLIC BLOOD PRESSURE: 130 MMHG | HEART RATE: 81 BPM

## 2020-10-13 PROCEDURE — G0300 HHS/HOSPICE OF LPN EA 15 MIN: HCPCS

## 2020-10-13 PROCEDURE — 3331090002 HH PPS REVENUE DEBIT

## 2020-10-13 PROCEDURE — 3331090001 HH PPS REVENUE CREDIT

## 2020-10-14 PROCEDURE — 3331090001 HH PPS REVENUE CREDIT

## 2020-10-14 PROCEDURE — 3331090002 HH PPS REVENUE DEBIT

## 2020-10-15 PROCEDURE — 3331090001 HH PPS REVENUE CREDIT

## 2020-10-15 PROCEDURE — 3331090002 HH PPS REVENUE DEBIT

## 2020-10-16 PROCEDURE — 3331090002 HH PPS REVENUE DEBIT

## 2020-10-16 PROCEDURE — 3331090001 HH PPS REVENUE CREDIT

## 2020-10-17 PROCEDURE — 3331090002 HH PPS REVENUE DEBIT

## 2020-10-17 PROCEDURE — 3331090001 HH PPS REVENUE CREDIT

## 2020-10-18 PROCEDURE — 3331090001 HH PPS REVENUE CREDIT

## 2020-10-18 PROCEDURE — 3331090002 HH PPS REVENUE DEBIT

## 2020-10-19 PROCEDURE — 3331090001 HH PPS REVENUE CREDIT

## 2020-10-19 PROCEDURE — 3331090002 HH PPS REVENUE DEBIT

## 2020-10-20 ENCOUNTER — HOME CARE VISIT (OUTPATIENT)
Dept: SCHEDULING | Facility: HOME HEALTH | Age: 60
End: 2020-10-20
Payer: MEDICARE

## 2020-10-20 VITALS
OXYGEN SATURATION: 95 % | HEART RATE: 82 BPM | DIASTOLIC BLOOD PRESSURE: 68 MMHG | SYSTOLIC BLOOD PRESSURE: 118 MMHG | TEMPERATURE: 97.5 F | RESPIRATION RATE: 16 BRPM

## 2020-10-20 PROCEDURE — 3331090001 HH PPS REVENUE CREDIT

## 2020-10-20 PROCEDURE — 3331090002 HH PPS REVENUE DEBIT

## 2020-10-20 PROCEDURE — G0299 HHS/HOSPICE OF RN EA 15 MIN: HCPCS

## 2020-10-31 ENCOUNTER — APPOINTMENT (OUTPATIENT)
Dept: GENERAL RADIOLOGY | Age: 60
DRG: 189 | End: 2020-10-31
Attending: EMERGENCY MEDICINE
Payer: MEDICARE

## 2020-10-31 ENCOUNTER — HOSPITAL ENCOUNTER (INPATIENT)
Age: 60
LOS: 3 days | Discharge: HOME | DRG: 189 | End: 2020-11-04
Attending: EMERGENCY MEDICINE | Admitting: INTERNAL MEDICINE
Payer: MEDICARE

## 2020-10-31 DIAGNOSIS — R94.39 ABNORMAL STRESS TEST: ICD-10-CM

## 2020-10-31 DIAGNOSIS — J96.12 ACUTE ON CHRONIC RESPIRATORY ACIDOSIS (HCC): ICD-10-CM

## 2020-10-31 DIAGNOSIS — J96.02 ACUTE RESPIRATORY FAILURE WITH HYPERCAPNIA (HCC): ICD-10-CM

## 2020-10-31 DIAGNOSIS — R41.82 ALTERED MENTAL STATUS, UNSPECIFIED ALTERED MENTAL STATUS TYPE: Primary | ICD-10-CM

## 2020-10-31 DIAGNOSIS — J96.02 ACUTE ON CHRONIC RESPIRATORY ACIDOSIS (HCC): ICD-10-CM

## 2020-10-31 DIAGNOSIS — I48.0 PAROXYSMAL ATRIAL FIBRILLATION (HCC): ICD-10-CM

## 2020-10-31 LAB
ALBUMIN SERPL-MCNC: 2.7 G/DL (ref 3.5–5)
ALBUMIN/GLOB SERPL: 0.6 {RATIO} (ref 1.1–2.2)
ALP SERPL-CCNC: 90 U/L (ref 45–117)
ALT SERPL-CCNC: 27 U/L (ref 12–78)
ANION GAP SERPL CALC-SCNC: ABNORMAL MMOL/L (ref 5–15)
AST SERPL-CCNC: 43 U/L (ref 15–37)
BASOPHILS # BLD: 0 K/UL (ref 0–0.1)
BASOPHILS NFR BLD: 0 % (ref 0–1)
BILIRUB SERPL-MCNC: 0.7 MG/DL (ref 0.2–1)
BUN SERPL-MCNC: 20 MG/DL (ref 6–20)
BUN/CREAT SERPL: 38 (ref 12–20)
CALCIUM SERPL-MCNC: 9.1 MG/DL (ref 8.5–10.1)
CHLORIDE SERPL-SCNC: 92 MMOL/L (ref 97–108)
CO2 SERPL-SCNC: >45 MMOL/L (ref 21–32)
COMMENT, HOLDF: NORMAL
CREAT SERPL-MCNC: 0.52 MG/DL (ref 0.55–1.02)
DIFFERENTIAL METHOD BLD: ABNORMAL
EOSINOPHIL # BLD: 0.2 K/UL (ref 0–0.4)
EOSINOPHIL NFR BLD: 2 % (ref 0–7)
ERYTHROCYTE [DISTWIDTH] IN BLOOD BY AUTOMATED COUNT: 13.9 % (ref 11.5–14.5)
GLOBULIN SER CALC-MCNC: 4.7 G/DL (ref 2–4)
GLUCOSE SERPL-MCNC: 133 MG/DL (ref 65–100)
HCT VFR BLD AUTO: 45.6 % (ref 35–47)
HGB BLD-MCNC: 13 G/DL (ref 11.5–16)
IMM GRANULOCYTES # BLD AUTO: 0.1 K/UL (ref 0–0.04)
IMM GRANULOCYTES NFR BLD AUTO: 1 % (ref 0–0.5)
LYMPHOCYTES # BLD: 1.6 K/UL (ref 0.8–3.5)
LYMPHOCYTES NFR BLD: 17 % (ref 12–49)
MCH RBC QN AUTO: 31.4 PG (ref 26–34)
MCHC RBC AUTO-ENTMCNC: 28.5 G/DL (ref 30–36.5)
MCV RBC AUTO: 110.1 FL (ref 80–99)
MONOCYTES # BLD: 0.7 K/UL (ref 0–1)
MONOCYTES NFR BLD: 7 % (ref 5–13)
NEUTS SEG # BLD: 6.7 K/UL (ref 1.8–8)
NEUTS SEG NFR BLD: 73 % (ref 32–75)
NRBC # BLD: 0 K/UL (ref 0–0.01)
NRBC BLD-RTO: 0 PER 100 WBC
PLATELET # BLD AUTO: 139 K/UL (ref 150–400)
PMV BLD AUTO: 12.8 FL (ref 8.9–12.9)
POTASSIUM SERPL-SCNC: 4.3 MMOL/L (ref 3.5–5.1)
PROT SERPL-MCNC: 7.4 G/DL (ref 6.4–8.2)
RBC # BLD AUTO: 4.14 M/UL (ref 3.8–5.2)
RBC MORPH BLD: ABNORMAL
SAMPLES BEING HELD,HOLD: NORMAL
SODIUM SERPL-SCNC: 139 MMOL/L (ref 136–145)
WBC # BLD AUTO: 9.3 K/UL (ref 3.6–11)

## 2020-10-31 PROCEDURE — 83880 ASSAY OF NATRIURETIC PEPTIDE: CPT

## 2020-10-31 PROCEDURE — 71045 X-RAY EXAM CHEST 1 VIEW: CPT

## 2020-10-31 PROCEDURE — 83735 ASSAY OF MAGNESIUM: CPT

## 2020-10-31 PROCEDURE — 96374 THER/PROPH/DIAG INJ IV PUSH: CPT

## 2020-10-31 PROCEDURE — 36415 COLL VENOUS BLD VENIPUNCTURE: CPT

## 2020-10-31 PROCEDURE — 94660 CPAP INITIATION&MGMT: CPT

## 2020-10-31 PROCEDURE — 84484 ASSAY OF TROPONIN QUANT: CPT

## 2020-10-31 PROCEDURE — 80053 COMPREHEN METABOLIC PANEL: CPT

## 2020-10-31 PROCEDURE — 36600 WITHDRAWAL OF ARTERIAL BLOOD: CPT

## 2020-10-31 PROCEDURE — 99285 EMERGENCY DEPT VISIT HI MDM: CPT

## 2020-10-31 PROCEDURE — 94640 AIRWAY INHALATION TREATMENT: CPT

## 2020-10-31 PROCEDURE — 85025 COMPLETE CBC W/AUTO DIFF WBC: CPT

## 2020-10-31 PROCEDURE — 82803 BLOOD GASES ANY COMBINATION: CPT

## 2020-10-31 NOTE — Clinical Note
TRANSFER - OUT REPORT:     Verbal report given to: MARIE JEFFERS. Report consisted of patient's Situation, Background, Assessment and   Recommendations(SBAR). Opportunity for questions and clarification was provided. Patient transported with a Registered Nurse and [address] [place] [address] / [facility] [employer]. Patient transported to: [facility].    TO [facility] VIA [facility]

## 2020-10-31 NOTE — Clinical Note
Aspirin Registry Question:   Aspirin was discussed with physician prior to the procedure. Aspirin was not given prior to the procedure.  PT RECEIVED LOVENOX

## 2020-11-01 PROBLEM — J96.02 ACUTE RESPIRATORY FAILURE WITH HYPERCAPNIA (HCC): Status: ACTIVE | Noted: 2020-11-01

## 2020-11-01 LAB
ANION GAP SERPL CALC-SCNC: ABNORMAL MMOL/L (ref 5–15)
APPEARANCE UR: CLEAR
ARTERIAL PATENCY WRIST A: YES
ATRIAL RATE: 78 BPM
BACTERIA URNS QL MICRO: NEGATIVE /HPF
BASE EXCESS BLD CALC-SCNC: >30 MMOL/L
BDY SITE: ABNORMAL
BILIRUB UR QL: NEGATIVE
BNP SERPL-MCNC: 1082 PG/ML
BUN SERPL-MCNC: 18 MG/DL (ref 6–20)
BUN/CREAT SERPL: 39 (ref 12–20)
CA-I BLD-SCNC: 1.18 MMOL/L (ref 1.12–1.32)
CALCIUM SERPL-MCNC: 9.6 MG/DL (ref 8.5–10.1)
CALCULATED P AXIS, ECG09: 52 DEGREES
CALCULATED R AXIS, ECG10: 56 DEGREES
CALCULATED T AXIS, ECG11: 47 DEGREES
CHLORIDE SERPL-SCNC: 86 MMOL/L (ref 97–108)
CK MB CFR SERPL CALC: ABNORMAL % (ref 0–2.5)
CK MB SERPL-MCNC: <1 NG/ML (ref 5–25)
CK SERPL-CCNC: 24 U/L (ref 26–192)
CO2 SERPL-SCNC: >45 MMOL/L (ref 21–32)
COLOR UR: ABNORMAL
CREAT SERPL-MCNC: 0.46 MG/DL (ref 0.55–1.02)
DIAGNOSIS, 93000: NORMAL
EPITH CASTS URNS QL MICRO: ABNORMAL /LPF
ERYTHROCYTE [DISTWIDTH] IN BLOOD BY AUTOMATED COUNT: 13.5 % (ref 11.5–14.5)
GAS FLOW.O2 O2 DELIVERY SYS: ABNORMAL L/MIN
GLUCOSE SERPL-MCNC: 100 MG/DL (ref 65–100)
GLUCOSE UR STRIP.AUTO-MCNC: NEGATIVE MG/DL
HCO3 BLD-SCNC: 55.4 MMOL/L (ref 22–26)
HCT VFR BLD AUTO: 42 % (ref 35–47)
HGB BLD-MCNC: 11.9 G/DL (ref 11.5–16)
HGB UR QL STRIP: NEGATIVE
KETONES UR QL STRIP.AUTO: NEGATIVE MG/DL
LEUKOCYTE ESTERASE UR QL STRIP.AUTO: NEGATIVE
MAGNESIUM SERPL-MCNC: 2.2 MG/DL (ref 1.6–2.4)
MCH RBC QN AUTO: 31.3 PG (ref 26–34)
MCHC RBC AUTO-ENTMCNC: 28.3 G/DL (ref 30–36.5)
MCV RBC AUTO: 110.5 FL (ref 80–99)
NITRITE UR QL STRIP.AUTO: POSITIVE
NRBC # BLD: 0 K/UL (ref 0–0.01)
NRBC BLD-RTO: 0 PER 100 WBC
O2/TOTAL GAS SETTING VFR VENT: 40 %
P-R INTERVAL, ECG05: 148 MS
PCO2 BLD: 76 MMHG (ref 35–45)
PEEP RESPIRATORY: 6 CMH2O
PH BLD: 7.47 [PH] (ref 7.35–7.45)
PH UR STRIP: 5.5 [PH] (ref 5–8)
PIP ISTAT,IPIP: 16
PLATELET # BLD AUTO: 130 K/UL (ref 150–400)
PMV BLD AUTO: 12.6 FL (ref 8.9–12.9)
PO2 BLD: 75 MMHG (ref 80–100)
POTASSIUM SERPL-SCNC: 3.9 MMOL/L (ref 3.5–5.1)
PROT UR STRIP-MCNC: NEGATIVE MG/DL
Q-T INTERVAL, ECG07: 400 MS
QRS DURATION, ECG06: 82 MS
QTC CALCULATION (BEZET), ECG08: 456 MS
RBC # BLD AUTO: 3.8 M/UL (ref 3.8–5.2)
RBC #/AREA URNS HPF: ABNORMAL /HPF (ref 0–5)
SAO2 % BLD: 95 % (ref 92–97)
SODIUM SERPL-SCNC: 141 MMOL/L (ref 136–145)
SP GR UR REFRACTOMETRY: 1.02 (ref 1–1.03)
SPECIMEN TYPE: ABNORMAL
TOTAL RESP. RATE, ITRR: 28
TROPONIN I SERPL-MCNC: <0.05 NG/ML
TROPONIN I SERPL-MCNC: <0.05 NG/ML
UA: UC IF INDICATED,UAUC: ABNORMAL
UROBILINOGEN UR QL STRIP.AUTO: 1 EU/DL (ref 0.2–1)
VENTRICULAR RATE, ECG03: 78 BPM
WBC # BLD AUTO: 7.6 K/UL (ref 3.6–11)
WBC URNS QL MICRO: ABNORMAL /HPF (ref 0–4)

## 2020-11-01 PROCEDURE — 36415 COLL VENOUS BLD VENIPUNCTURE: CPT

## 2020-11-01 PROCEDURE — 74011250637 HC RX REV CODE- 250/637: Performed by: INTERNAL MEDICINE

## 2020-11-01 PROCEDURE — 77030029684 HC NEB SM VOL KT MONA -A

## 2020-11-01 PROCEDURE — 74011250636 HC RX REV CODE- 250/636: Performed by: INTERNAL MEDICINE

## 2020-11-01 PROCEDURE — 5A09557 ASSISTANCE WITH RESPIRATORY VENTILATION, GREATER THAN 96 CONSECUTIVE HOURS, CONTINUOUS POSITIVE AIRWAY PRESSURE: ICD-10-PCS | Performed by: INTERNAL MEDICINE

## 2020-11-01 PROCEDURE — 65660000001 HC RM ICU INTERMED STEPDOWN

## 2020-11-01 PROCEDURE — 74011000250 HC RX REV CODE- 250: Performed by: NURSE PRACTITIONER

## 2020-11-01 PROCEDURE — 82803 BLOOD GASES ANY COMBINATION: CPT

## 2020-11-01 PROCEDURE — 99222 1ST HOSP IP/OBS MODERATE 55: CPT | Performed by: INTERNAL MEDICINE

## 2020-11-01 PROCEDURE — 74011250636 HC RX REV CODE- 250/636: Performed by: EMERGENCY MEDICINE

## 2020-11-01 PROCEDURE — 77030038269 HC DRN EXT URIN PURWCK BARD -A

## 2020-11-01 PROCEDURE — 94640 AIRWAY INHALATION TREATMENT: CPT

## 2020-11-01 PROCEDURE — 93005 ELECTROCARDIOGRAM TRACING: CPT

## 2020-11-01 PROCEDURE — 81001 URINALYSIS AUTO W/SCOPE: CPT

## 2020-11-01 PROCEDURE — 74011000258 HC RX REV CODE- 258: Performed by: INTERNAL MEDICINE

## 2020-11-01 PROCEDURE — 77010033678 HC OXYGEN DAILY

## 2020-11-01 PROCEDURE — 94660 CPAP INITIATION&MGMT: CPT

## 2020-11-01 PROCEDURE — 80048 BASIC METABOLIC PNL TOTAL CA: CPT

## 2020-11-01 PROCEDURE — 85027 COMPLETE CBC AUTOMATED: CPT

## 2020-11-01 PROCEDURE — 2709999900 HC NON-CHARGEABLE SUPPLY

## 2020-11-01 PROCEDURE — 36600 WITHDRAWAL OF ARTERIAL BLOOD: CPT

## 2020-11-01 PROCEDURE — 74011000250 HC RX REV CODE- 250: Performed by: INTERNAL MEDICINE

## 2020-11-01 PROCEDURE — 82550 ASSAY OF CK (CPK): CPT

## 2020-11-01 RX ORDER — ENOXAPARIN SODIUM 150 MG/ML
1 INJECTION SUBCUTANEOUS EVERY 12 HOURS
Status: DISCONTINUED | OUTPATIENT
Start: 2020-11-01 | End: 2020-11-02

## 2020-11-01 RX ORDER — BALSAM PERU/CASTOR OIL
OINTMENT (GRAM) TOPICAL 2 TIMES DAILY
Status: DISCONTINUED | OUTPATIENT
Start: 2020-11-01 | End: 2020-11-04 | Stop reason: HOSPADM

## 2020-11-01 RX ORDER — DILTIAZEM HYDROCHLORIDE 5 MG/ML
5 INJECTION INTRAVENOUS ONCE
Status: DISCONTINUED | OUTPATIENT
Start: 2020-11-01 | End: 2020-11-01

## 2020-11-01 RX ORDER — PREDNISONE 20 MG/1
60 TABLET ORAL
Status: DISCONTINUED | OUTPATIENT
Start: 2020-11-01 | End: 2020-11-01

## 2020-11-01 RX ORDER — ACETAMINOPHEN 650 MG/1
650 SUPPOSITORY RECTAL
Status: DISCONTINUED | OUTPATIENT
Start: 2020-11-01 | End: 2020-11-04 | Stop reason: HOSPADM

## 2020-11-01 RX ORDER — LEVOFLOXACIN 5 MG/ML
500 INJECTION, SOLUTION INTRAVENOUS EVERY 24 HOURS
Status: DISCONTINUED | OUTPATIENT
Start: 2020-11-01 | End: 2020-11-01

## 2020-11-01 RX ORDER — POLYETHYLENE GLYCOL 3350 17 G/17G
17 POWDER, FOR SOLUTION ORAL DAILY PRN
Status: DISCONTINUED | OUTPATIENT
Start: 2020-11-01 | End: 2020-11-04 | Stop reason: HOSPADM

## 2020-11-01 RX ORDER — FUROSEMIDE 40 MG/1
40 TABLET ORAL DAILY
Status: DISCONTINUED | OUTPATIENT
Start: 2020-11-01 | End: 2020-11-01

## 2020-11-01 RX ORDER — ONDANSETRON 2 MG/ML
4 INJECTION INTRAMUSCULAR; INTRAVENOUS
Status: DISCONTINUED | OUTPATIENT
Start: 2020-11-01 | End: 2020-11-04 | Stop reason: HOSPADM

## 2020-11-01 RX ORDER — DILTIAZEM HYDROCHLORIDE 5 MG/ML
5 INJECTION INTRAVENOUS ONCE
Status: DISCONTINUED | OUTPATIENT
Start: 2020-11-01 | End: 2020-11-01 | Stop reason: SDUPTHER

## 2020-11-01 RX ORDER — ACETAMINOPHEN 325 MG/1
650 TABLET ORAL
Status: DISCONTINUED | OUTPATIENT
Start: 2020-11-01 | End: 2020-11-02 | Stop reason: SDUPTHER

## 2020-11-01 RX ORDER — DIGOXIN 0.25 MG/ML
125 INJECTION INTRAMUSCULAR; INTRAVENOUS
Status: COMPLETED | OUTPATIENT
Start: 2020-11-01 | End: 2020-11-01

## 2020-11-01 RX ORDER — ATORVASTATIN CALCIUM 10 MG/1
10 TABLET, FILM COATED ORAL
Status: DISCONTINUED | OUTPATIENT
Start: 2020-11-01 | End: 2020-11-04 | Stop reason: HOSPADM

## 2020-11-01 RX ORDER — ACETAMINOPHEN 325 MG/1
650 TABLET ORAL
Status: DISCONTINUED | OUTPATIENT
Start: 2020-11-01 | End: 2020-11-04 | Stop reason: HOSPADM

## 2020-11-01 RX ORDER — IPRATROPIUM BROMIDE AND ALBUTEROL SULFATE 2.5; .5 MG/3ML; MG/3ML
3 SOLUTION RESPIRATORY (INHALATION)
Status: DISCONTINUED | OUTPATIENT
Start: 2020-11-01 | End: 2020-11-04 | Stop reason: HOSPADM

## 2020-11-01 RX ORDER — FUROSEMIDE 10 MG/ML
40 INJECTION INTRAMUSCULAR; INTRAVENOUS 2 TIMES DAILY
Status: DISCONTINUED | OUTPATIENT
Start: 2020-11-01 | End: 2020-11-03

## 2020-11-01 RX ORDER — METOPROLOL TARTRATE 25 MG/1
25 TABLET, FILM COATED ORAL 2 TIMES DAILY
Status: DISCONTINUED | OUTPATIENT
Start: 2020-11-01 | End: 2020-11-04 | Stop reason: HOSPADM

## 2020-11-01 RX ORDER — BUDESONIDE 0.25 MG/2ML
250 INHALANT ORAL
Status: DISCONTINUED | OUTPATIENT
Start: 2020-11-01 | End: 2020-11-01

## 2020-11-01 RX ORDER — SODIUM CHLORIDE 0.9 % (FLUSH) 0.9 %
5-40 SYRINGE (ML) INJECTION AS NEEDED
Status: DISCONTINUED | OUTPATIENT
Start: 2020-11-01 | End: 2020-11-04 | Stop reason: HOSPADM

## 2020-11-01 RX ORDER — FUROSEMIDE 10 MG/ML
40 INJECTION INTRAMUSCULAR; INTRAVENOUS
Status: COMPLETED | OUTPATIENT
Start: 2020-11-01 | End: 2020-11-01

## 2020-11-01 RX ORDER — DILTIAZEM HYDROCHLORIDE 120 MG/1
120 CAPSULE, COATED, EXTENDED RELEASE ORAL DAILY
Status: DISCONTINUED | OUTPATIENT
Start: 2020-11-01 | End: 2020-11-04 | Stop reason: HOSPADM

## 2020-11-01 RX ORDER — DILTIAZEM HYDROCHLORIDE 5 MG/ML
5 INJECTION INTRAVENOUS ONCE
Status: COMPLETED | OUTPATIENT
Start: 2020-11-01 | End: 2020-11-01

## 2020-11-01 RX ORDER — NITROGLYCERIN 0.4 MG/1
0.4 TABLET SUBLINGUAL
Status: DISCONTINUED | OUTPATIENT
Start: 2020-11-01 | End: 2020-11-04 | Stop reason: HOSPADM

## 2020-11-01 RX ORDER — ARFORMOTEROL TARTRATE 15 UG/2ML
15 SOLUTION RESPIRATORY (INHALATION)
Status: DISCONTINUED | OUTPATIENT
Start: 2020-11-01 | End: 2020-11-01

## 2020-11-01 RX ORDER — SODIUM CHLORIDE 0.9 % (FLUSH) 0.9 %
5-40 SYRINGE (ML) INJECTION EVERY 8 HOURS
Status: DISCONTINUED | OUTPATIENT
Start: 2020-11-01 | End: 2020-11-04 | Stop reason: HOSPADM

## 2020-11-01 RX ORDER — MICONAZOLE NITRATE 20 MG/G
CREAM TOPICAL 2 TIMES DAILY
Status: DISCONTINUED | OUTPATIENT
Start: 2020-11-01 | End: 2020-11-04 | Stop reason: HOSPADM

## 2020-11-01 RX ORDER — PROMETHAZINE HYDROCHLORIDE 25 MG/1
12.5 TABLET ORAL
Status: DISCONTINUED | OUTPATIENT
Start: 2020-11-01 | End: 2020-11-04 | Stop reason: HOSPADM

## 2020-11-01 RX ADMIN — Medication 10 ML: at 05:06

## 2020-11-01 RX ADMIN — AMIODARONE HYDROCHLORIDE 150 MG: 1.5 INJECTION, SOLUTION INTRAVENOUS at 08:13

## 2020-11-01 RX ADMIN — FUROSEMIDE 40 MG: 10 INJECTION, SOLUTION INTRAMUSCULAR; INTRAVENOUS at 00:37

## 2020-11-01 RX ADMIN — Medication 10 ML: at 14:22

## 2020-11-01 RX ADMIN — DILTIAZEM HYDROCHLORIDE 5 MG: 5 INJECTION INTRAVENOUS at 06:45

## 2020-11-01 RX ADMIN — DIGOXIN 125 MCG: 0.25 INJECTION INTRAMUSCULAR; INTRAVENOUS at 07:18

## 2020-11-01 RX ADMIN — ARFORMOTEROL TARTRATE 15 MCG: 15 SOLUTION RESPIRATORY (INHALATION) at 07:45

## 2020-11-01 RX ADMIN — ENOXAPARIN SODIUM 140 MG: 150 INJECTION SUBCUTANEOUS at 10:49

## 2020-11-01 RX ADMIN — METOPROLOL TARTRATE 25 MG: 25 TABLET, FILM COATED ORAL at 10:49

## 2020-11-01 RX ADMIN — ENOXAPARIN SODIUM 140 MG: 150 INJECTION SUBCUTANEOUS at 21:06

## 2020-11-01 RX ADMIN — IPRATROPIUM BROMIDE AND ALBUTEROL SULFATE 3 ML: .5; 3 SOLUTION RESPIRATORY (INHALATION) at 19:48

## 2020-11-01 RX ADMIN — DILTIAZEM HYDROCHLORIDE 5 MG: 5 INJECTION INTRAVENOUS at 06:19

## 2020-11-01 RX ADMIN — Medication 10 ML: at 21:06

## 2020-11-01 RX ADMIN — DILTIAZEM HYDROCHLORIDE 120 MG: 120 CAPSULE, COATED, EXTENDED RELEASE ORAL at 14:18

## 2020-11-01 RX ADMIN — IPRATROPIUM BROMIDE AND ALBUTEROL SULFATE 3 ML: .5; 3 SOLUTION RESPIRATORY (INHALATION) at 11:38

## 2020-11-01 RX ADMIN — METOPROLOL TARTRATE 25 MG: 25 TABLET, FILM COATED ORAL at 18:13

## 2020-11-01 RX ADMIN — IPRATROPIUM BROMIDE AND ALBUTEROL SULFATE 3 ML: .5; 3 SOLUTION RESPIRATORY (INHALATION) at 03:14

## 2020-11-01 RX ADMIN — FUROSEMIDE 40 MG: 10 INJECTION, SOLUTION INTRAMUSCULAR; INTRAVENOUS at 10:49

## 2020-11-01 RX ADMIN — MICONAZOLE NITRATE: 20 CREAM TOPICAL at 19:53

## 2020-11-01 RX ADMIN — FUROSEMIDE 40 MG: 10 INJECTION, SOLUTION INTRAMUSCULAR; INTRAVENOUS at 18:13

## 2020-11-01 RX ADMIN — ATORVASTATIN CALCIUM 10 MG: 10 TABLET, FILM COATED ORAL at 21:06

## 2020-11-01 RX ADMIN — CASTOR OIL AND BALSAM, PERU: 788; 87 OINTMENT TOPICAL at 19:54

## 2020-11-01 RX ADMIN — BUDESONIDE 250 MCG: 0.25 INHALANT RESPIRATORY (INHALATION) at 07:45

## 2020-11-01 RX ADMIN — IPRATROPIUM BROMIDE AND ALBUTEROL SULFATE 3 ML: .5; 3 SOLUTION RESPIRATORY (INHALATION) at 23:46

## 2020-11-01 RX ADMIN — IPRATROPIUM BROMIDE AND ALBUTEROL SULFATE 3 ML: .5; 3 SOLUTION RESPIRATORY (INHALATION) at 15:06

## 2020-11-01 RX ADMIN — AMIODARONE HYDROCHLORIDE 1 MG/MIN: 50 INJECTION, SOLUTION INTRAVENOUS at 08:26

## 2020-11-01 NOTE — PROGRESS NOTES
ADULT PROTOCOL: JET AEROSOL ASSESSMENT    Patient  Norman Early     63 y.o.   female     11/1/2020  11:57 AM    Breath Sounds Pre Procedure: Right Breath Sounds: Diminished                               Left Breath Sounds: Diminished    Breath Sounds Post Procedure: Right Breath Sounds: Diminished                                 Left Breath Sounds: Diminished    Breathing pattern: Pre procedure Breathing Pattern: Regular          Post procedure Breathing Pattern: Regular    Heart Rate: Pre procedure Pulse: 81           Post procedure Pulse: 84    Resp Rate: Pre procedure Respirations: 16           Post procedure Respirations: 16            Cough: Pre procedure Cough: Non-productive               Post procedure Cough: Non-productive      Oxygen: O2 Device: Nasal cannula   Flow rate (L/min) 3 lpm, Home O2 3 lpm     Changed: NO    SpO2: Pre procedure SpO2: 94 %   with oxygen              Post procedure SpO2: 95 %  with oxygen    Nebulizer Therapy: Current medications Aerosolized Medications: DuoNeb      Changed: NO        Problem List:   Patient Active Problem List   Diagnosis Code    COVID-19 ruled out Z03.818    Acute respiratory failure with hypercapnia ([facility] [address] J96.02       Respiratory Therapist: Iman Lynch RT

## 2020-11-01 NOTE — PROGRESS NOTES
Hospitalist Progress Note    NAME: Dionne Cerrato   :  1960   MRN:  [id number]       Assessment / Plan:  Acute metabolic encephalopathy  Acute on chronic hypoxic and hypercapnic RF, POA  Acute COPD exacerbation  CXR with no acute process. Stable cardiac silhouette enlargement with central pulmonary vascular congestion. probnp 1K  Mentation improved after pt placed on bipap. PCO2 on admission 88.5  Start empiric IV abx, prednisone, nebs  Bipap, repeat ABG, wean as tolerated. Lasix as tolerated, parameters placed. Consult pulmonary to help with bipap    Atrial fib with RVR  HTN  RRT called for CP at 6:30AM  Pt received cardizem and dig. BP no win ~100. She remains in afib RVR with HR 150s  Will start amiodarone gtt  Hold BB/antihypertensives  Echo in 2020 showed EF 50-55%. Mild concentric hypertrophy  She was on eliquis outpt but unable to afford and took herself off  Will start lovenox for now  Cardiology consultation    Hyperlipidemia  Cont' statin    T2DM  Currently not on meds. SSI for now    Morbid obesity / Body mass index is 54.91 kg/m². Code status: Full  Prophylaxis: Lovenox  Recommended Disposition: TBD     Subjective:     Chief Complaint / Reason for Physician Visit  Pt seen, on bipap, follow commands, conversant. Discussed with RN events overnight. Review of Systems:  Symptom Y/N Comments  Symptom Y/N Comments   Fever/Chills n   Chest Pain n    Poor Appetite    Edema     Cough    Abdominal Pain     Sputum    Joint Pain     SOB/CHOPRA n   Pruritis/Rash     Nausea/vomit    Tolerating PT/OT     Diarrhea    Tolerating Diet     Constipation    Other       Could NOT obtain due to:      Objective:     VITALS:   Last 24hrs VS reviewed since prior progress note.  Most recent are:  Patient Vitals for the past 24 hrs:   Temp Pulse Resp BP SpO2   20 0746     96 %   20 0737  (!) 140  (!) 108/58    20 0718  (!) 148  123/69    20 0701  (!) 145  109/73 95 %   11/01/20 0700  (!) 142  109/73 93 %   11/01/20 0658  (!) 127   (!) 86 %   11/01/20 0656  (!) 125   93 %   11/01/20 0655    128/74    11/01/20 0653  (!) 136      11/01/20 0650  (!) 128  98/63    11/01/20 0645  (!) 138  (!) 129/59    11/01/20 0637  (!) 133  (!) 111/54    11/01/20 0635  (!) 139  (!) 111/54 94 %   11/01/20 0632  (!) 139  (!) 129/59    11/01/20 0630    (!) 129/59    11/01/20 0626 99.5 °F (37.5 °C) (!) 129 22 130/71 98 %   11/01/20 0624  (!) 163  (!) 128/112 98 %   11/01/20 0619  (!) 150  116/60    11/01/20 0610  (!) 166  116/82 100 %   11/01/20 0607    (!) 111/57    11/01/20 0600  (!) 159  121/66 98 %   11/01/20 0554  (!) 156  (!) 107/57 97 %   11/01/20 0552  (!) 154   100 %   11/01/20 0430 99.3 °F (37.4 °C) 83 24 (!) 115/50 100 %   11/01/20 0314     94 %   11/01/20 0215 98.4 °F (36.9 °C) 79 19 116/68 99 %   11/01/20 0200  82 26 (!) 118/98 93 %   11/01/20 0145  81 21 (!) 128/55 92 %   11/01/20 0100 98.2 °F (36.8 °C) 81 25 (!) 140/57 96 %   11/01/20 0045  79 19 (!) 136/56 97 %   11/01/20 0030  77 24 (!) 144/68 100 %   11/01/20 0015  75 29 (!) 131/53 99 %   10/31/20 2353     98 %   10/31/20 2330  87 22 (!) 149/64 100 %   10/31/20 2315 98 °F (36.7 °C) 83 27 (!) 148/66 100 %   10/31/20 2300  85 26 (!) 140/63 98 %   10/31/20 2245  88 24 (!) 144/68 100 %   10/31/20 2230  83 22 (!) 149/58 96 %   10/31/20 2215  83 20 (!) 165/60 100 %   10/31/20 2200  84 19 (!) 185/151 100 %   10/31/20 2145  84 22 (!) 141/65 92 %   10/31/20 2130  83 26 (!) 147/66 99 %   10/31/20 2115  84 26 (!) 151/62 98 %   10/31/20 2100  86 22 (!) 142/65 98 %   10/31/20 2035 98.3 °F (36.8 °C) 82 22 (!) 122/50 99 %     No intake or output data in the 24 hours ending 11/01/20 0751     PHYSICAL EXAM:  General: WD, WN. Alert, cooperative, no acute distress    EENT:  EOMI. Anicteric sclerae. MMM  Resp:  CTA bilaterally, no wheezing or rales.   No accessory muscle use  CV:  irregular  rhythm,  tachycardic. No edema  GI:  Soft, Non distended, Non tender.  +Bowel sounds  Neurologic:  Alert and oriented X 3, normal speech  Psych:   Not anxious nor agitated  Skin:  No rashes. No jaundice    Reviewed most current lab test results and cultures  YES  Reviewed most current radiology test results   YES  Review and summation of old records today    NO  Reviewed patient's current orders and MAR    YES  PMH/SH reviewed - no change compared to H&P  ________________________________________________________________________  Care Plan discussed with:    Comments   Patient x    Family      RN x    Care Manager     Consultant                        Multidiciplinary team rounds were held today with , nursing, pharmacist and clinical coordinator. Patient's plan of care was discussed; medications were reviewed and discharge planning was addressed. ________________________________________________________________________  Total NON critical care TIME:    Minutes    Total CRITICAL CARE TIME Spent: 39  Minutes non procedure based      Comments   >50% of visit spent in counseling and coordination of care     ________________________________________________________________________  Andre Camarillo MD     Procedures: see electronic medical records for all procedures/Xrays and details which were not copied into this note but were reviewed prior to creation of Plan. LABS:  I reviewed today's most current labs and imaging studies.   Pertinent labs include:  Recent Labs     11/01/20  0433 10/31/20  2138   WBC 7.6 9.3   HGB 11.9 13.0   HCT 42.0 45.6   * 139*     Recent Labs     11/01/20  0433 10/31/20  2240    139   K 3.9 4.3   CL 86* 92*   CO2 >45* >45*    133*   BUN 18 20   CREA 0.46* 0.52*   CA 9.6 9.1   MG  --  2.2   ALB  --  2.7*   TBILI  --  0.7   ALT  --  27       Signed: Andre Camarillo MD

## 2020-11-01 NOTE — CONSULTS
[facility]  Pulmonary, Critical Care, and Sleep Medicine    Initial Patient Consult    Name: Mis Hidalgo MRN: [id number]   : 1960 Hospital: [facility]   Date: 2020        IMPRESSION:   · Acute on chronic hypoxemic, hypercapnic respiratory failure - better after treatment with bipap   · Probable JOSLYN/OHV   · CHF exacerbation   · Afib   · History of tobacco abuse, possible COPD but unknown FEV1, no wheezing on exam   · Morbid obesity   · Noncompliance   · Debility       RECOMMENDATIONS:   · Bipap whenever she is asleep but okay to stay off it now   · Agree with diuresis   · Afib per cards   · I don't think she is having a COPD exacerbation so I have stopped and abx, prednisone, budesonide, and brovana. Okay to stay on duo-nebs for now. I think it would be helpful to get simple spirometry while she is as upright as possible while she is here which can be done later this week. · We can see her in outpatient follow up via telemedicine since she can't come and see us in person. We can arrange to see her in pulm and sleep clinics after d/c. Subjective: This patient has been seen and evaluated at the request of Dr. Sumaya Mansfield for hypercapnia. Patient is a 63 y.o. female with a history of chronic hypoxemic, hypercapnic respiratory failure, afib, CHF, DM2, and HTN who was admitted with AMS. She says she has had increased dyspnea and cough lately. She has no purulent sputum, fevers, chills, or sweats. She is supposed to be on Eliquis but stopped taking it because she could not afford it. She has bipap at home but doesn't use it because she says no one ever told her she had to use it. She is bedbound due to knee pain; her daughter takes care of her. She smoked for 40 years but doesn't carry the diagnosis of COPD. We have seen her here in the ICU but never in the office. Again, she is bedbound. She wears 3L O2 at baseline.      On admission ABG was 7.19/>90/114. She was placed on bipap and has had good improvement in her ABG and mentation. Past Medical History:   Diagnosis Date    [facility] [address] [facility]     Diabetes ([facility] [address]     Hypertension     Ill-defined condition     high cholesterol    Ill-defined condition     tachycardia    Other and unspecified symptoms and signs involving general sensations and perceptions     ruptured disc      History reviewed. No pertinent surgical history. Prior to Admission medications    Medication Sig Start Date End Date Taking? Authorizing Provider   OXYGEN-AIR DELIVERY SYSTEMS 3 L/min by Nasal route continuous. 8/31/20  Yes Adela Joaquin NP   furosemide (LASIX) 40 mg tablet Take 1 Tab by mouth daily. 8/7/20  Yes Luis Antonio Armstrong MD   atorvastatin (LIPITOR) 10 mg tablet Take 10 mg by mouth nightly. Yes Other, MD Russ   metoprolol (LOPRESSOR) 25 mg tablet Take 25 mg by mouth two (2) times a day. Yes Other, MD Russ   bismuth subsalicylate (PEPTO-BISMOL MAXIMUM STRENGTH) 525 mg/15 mL susp Take 30 mL by mouth as needed for Diarrhea. Provider, Historical   multivitamin, tx-iron-ca-min (THERA-M w/ IRON) 9 mg iron-400 mcg tab tablet Take 1 Tab by mouth daily. Provider, Historical   acetaminophen (TYLENOL) 325 mg tablet Take 650 mg by mouth every six (6) hours as needed for Pain. Provider, Historical   nitroglycerin (Nitrostat) 0.4 mg SL tablet 0.4 mg by SubLINGual route every five (5) minutes as needed for Chest Pain. Up to 3 doses. Provider, Historical   azithromycin (Zithromax Z-Andrés) 250 mg tablet Z-Andrés use as directed 8/14/20   Karely Hogue MD   apixaban (Eliquis) 5 mg tablet Take 1 Tab by mouth two (2) times a day. 8/7/20   Luis Antonio Armstrong MD     Allergies   Allergen Reactions    Cefazolin Rash    Other Plant, Animal, Environmental Rash     Ivory soap caused rash on hands.       Social History     Tobacco Use    Smoking status: Former Smoker    Smokeless tobacco: Never Used Substance Use Topics    Alcohol use: No      History reviewed. No pertinent family history. Current Facility-Administered Medications   Medication Dose Route Frequency    atorvastatin (LIPITOR) tablet 10 mg  10 mg Oral QHS    metoprolol tartrate (LOPRESSOR) tablet 25 mg  25 mg Oral BID    sodium chloride (NS) flush 5-40 mL  5-40 mL IntraVENous Q8H    albuterol-ipratropium (DUO-NEB) 2.5 MG-0.5 MG/3 ML  3 mL Nebulization Q4H RT    furosemide (LASIX) injection 40 mg  40 mg IntraVENous BID    amiodarone (CORDARONE) 375 mg/250 mL D5W infusion  0.5-1 mg/min IntraVENous TITRATE    enoxaparin (LOVENOX) injection ++ PARTIAL SYRINGE ++ 140 mg  1 mg/kg SubCUTAneous Q12H       Review of Systems:  Gen: +fatigue, -fevers/chills/sweats   HENT: -voice change   Eyes: -discharge   Resp: +dyspnea, +cough  CV: +CP, +dyspnea, +edema   GI: -abdominal pain/n/v/d  : -dysuria   MS: +joint pain   Skin: no changes   Neuro: +mental status changes   Psych: +AMS     Objective:   Vital Signs:    Visit Vitals  BP (!) 115/57   Pulse 83   Temp 99.5 °F (37.5 °C)   Resp 22   Ht 5' 3\" (1.6 m)   Wt 140.6 kg (310 lb)   SpO2 96%   BMI 54.91 kg/m²       O2 Device: BIPAP   O2 Flow Rate (L/min): 3 l/min(baseline)   Temp (24hrs), Av.6 °F (37 °C), Min:98 °F (36.7 °C), Max:99.5 °F (37.5 °C)       Intake/Output:   Last shift:      No intake/output data recorded. Last 3 shifts: No intake/output data recorded. No intake or output data in the 24 hours ending 20 1026   Physical Exam:   General:  Alert, cooperative, no distress, appears stated age. Obese. Head:  Normocephalic, without obvious abnormality, atraumatic. Eyes:  Conjunctivae/corneas clear. PERRL, EOMs intact. Nose: Nares normal. Septum midline. Mucosa normal. No drainage or sinus tenderness.    Throat: Lips, mucosa, and tongue normal. Teeth and gums normal.   Neck: Supple, symmetrical, trachea midline, no adenopathy, thyroid: no enlargment/tenderness/nodules, no carotid bruit and no JVD. Back:   Symmetric, no curvature. ROM normal.   Lungs:   Clear to auscultation bilaterally. Anterior exam only as patient cannot sit up. Chest wall:  No tenderness or deformity. Heart:  Irregular rate and rhythm     Abdomen:   Soft, non-tender. Bowel sounds normal. No masses,  No organomegaly. Extremities: Extremities atraumatic, no cyanosis. +Edema. Pulses: 2+ and symmetric all extremities. Skin: Skin color, texture, turgor normal. No rashes or lesions   Lymph nodes: Cervical, supraclavicular, and axillary nodes normal.   Neurologic: Grossly nonfocal     Data review:     Recent Results (from the past 24 hour(s))   CBC WITH AUTOMATED DIFF    Collection Time: 10/31/20  9:38 PM   Result Value Ref Range    WBC 9.3 3.6 - 11.0 K/uL    RBC 4.14 3.80 - 5.20 M/uL    HGB 13.0 11.5 - 16.0 g/dL    HCT 45.6 35.0 - 47.0 %    .1 (H) 80.0 - 99.0 FL    MCH 31.4 26.0 - 34.0 PG    MCHC 28.5 (L) 30.0 - 36.5 g/dL    RDW 13.9 11.5 - 14.5 %    PLATELET 983 (L) 487 - 400 K/uL    MPV 12.8 8.9 - 12.9 FL    NRBC 0.0 0  WBC    ABSOLUTE NRBC 0.00 0.00 - 0.01 K/uL    NEUTROPHILS 73 32 - 75 %    LYMPHOCYTES 17 12 - 49 %    MONOCYTES 7 5 - 13 %    EOSINOPHILS 2 0 - 7 %    BASOPHILS 0 0 - 1 %    IMMATURE GRANULOCYTES 1 (H) 0.0 - 0.5 %    ABS. NEUTROPHILS 6.7 1.8 - 8.0 K/UL    ABS. LYMPHOCYTES 1.6 0.8 - 3.5 K/UL    ABS. MONOCYTES 0.7 0.0 - 1.0 K/UL    ABS. EOSINOPHILS 0.2 0.0 - 0.4 K/UL    ABS. BASOPHILS 0.0 0.0 - 0.1 K/UL    ABS. IMM.  GRANS. 0.1 (H) 0.00 - 0.04 K/UL    DF SMEAR SCANNED      RBC COMMENTS MACROCYTOSIS  1+        RBC COMMENTS STOMATOCYTES  1+        RBC COMMENTS HYPOCHROMIA  PRESENT        RBC COMMENTS BASOPHILIC STIPPLING  PRESENT       SAMPLES BEING HELD    Collection Time: 10/31/20  9:38 PM   Result Value Ref Range    SAMPLES BEING HELD 1BLU     COMMENT        Add-on orders for these samples will be processed based on acceptable specimen integrity and analyte stability, which may vary by analyte. METABOLIC PANEL, COMPREHENSIVE    Collection Time: 10/31/20 10:40 PM   Result Value Ref Range    Sodium 139 136 - 145 mmol/L    Potassium 4.3 3.5 - 5.1 mmol/L    Chloride 92 (L) 97 - 108 mmol/L    CO2 >45 (HH) 21 - 32 mmol/L    Anion gap Cannot be calculated 5 - 15 mmol/L    Glucose 133 (H) 65 - 100 mg/dL    BUN 20 6 - 20 MG/DL    Creatinine 0.52 (L) 0.55 - 1.02 MG/DL    BUN/Creatinine ratio 38 (H) 12 - 20      GFR est AA >60 >60 ml/min/1.73m2    GFR est non-AA >60 >60 ml/min/1.73m2    Calcium 9.1 8.5 - 10.1 MG/DL    Bilirubin, total 0.7 0.2 - 1.0 MG/DL    ALT (SGPT) 27 12 - 78 U/L    AST (SGOT) 43 (H) 15 - 37 U/L    Alk.  phosphatase 90 45 - 117 U/L    Protein, total 7.4 6.4 - 8.2 g/dL    Albumin 2.7 (L) 3.5 - 5.0 g/dL    Globulin 4.7 (H) 2.0 - 4.0 g/dL    A-G Ratio 0.6 (L) 1.1 - 2.2     TROPONIN I    Collection Time: 10/31/20 10:40 PM   Result Value Ref Range    Troponin-I, Qt. <0.05 <0.05 ng/mL   NT-PRO BNP    Collection Time: 10/31/20 10:40 PM   Result Value Ref Range    NT pro-BNP 1,082 (H) <125 PG/ML   MAGNESIUM    Collection Time: 10/31/20 10:40 PM   Result Value Ref Range    Magnesium 2.2 1.6 - 2.4 mg/dL   EKG, 12 LEAD, INITIAL    Collection Time: 11/01/20 12:15 AM   Result Value Ref Range    Ventricular Rate 78 BPM    Atrial Rate 78 BPM    P-R Interval 148 ms    QRS Duration 82 ms    Q-T Interval 400 ms    QTC Calculation (Bezet) 456 ms    Calculated P Axis 52 degrees    Calculated R Axis 56 degrees    Calculated T Axis 47 degrees    Diagnosis       Normal sinus rhythm  Normal ECG  When compared with ECG of 14-AUG-2020 12:34,  premature atrial complexes are no longer present  T wave inversion no longer evident in Inferior leads     URINALYSIS W/ REFLEX CULTURE    Collection Time: 11/01/20 12:24 AM    Specimen: Urine   Result Value Ref Range    Color YELLOW/STRAW      Appearance CLEAR CLEAR      Specific gravity 1.016 1.003 - 1.030      pH (UA) 5.5 5.0 - 8.0      Protein Negative NEG mg/dL Glucose Negative NEG mg/dL    Ketone Negative NEG mg/dL    Bilirubin Negative NEG      Blood Negative NEG      Urobilinogen 1.0 0.2 - 1.0 EU/dL    Nitrites Positive (A) NEG      Leukocyte Esterase Negative NEG      WBC 0-4 0 - 4 /hpf    RBC 0-5 0 - 5 /hpf    Epithelial cells FEW FEW /lpf    Bacteria Negative NEG /hpf    UA:UC IF INDICATED CULTURE NOT INDICATED BY UA RESULT CNI     METABOLIC PANEL, BASIC    Collection Time: 11/01/20  4:33 AM   Result Value Ref Range    Sodium 141 136 - 145 mmol/L    Potassium 3.9 3.5 - 5.1 mmol/L    Chloride 86 (L) 97 - 108 mmol/L    CO2 >45 (HH) 21 - 32 mmol/L    Anion gap Cannot be calculated 5 - 15 mmol/L    Glucose 100 65 - 100 mg/dL    BUN 18 6 - 20 MG/DL    Creatinine 0.46 (L) 0.55 - 1.02 MG/DL    BUN/Creatinine ratio 39 (H) 12 - 20      GFR est AA >60 >60 ml/min/1.73m2    GFR est non-AA >60 >60 ml/min/1.73m2    Calcium 9.6 8.5 - 10.1 MG/DL   CBC W/O DIFF    Collection Time: 11/01/20  4:33 AM   Result Value Ref Range    WBC 7.6 3.6 - 11.0 K/uL    RBC 3.80 3.80 - 5.20 M/uL    HGB 11.9 11.5 - 16.0 g/dL    HCT 42.0 35.0 - 47.0 %    .5 (H) 80.0 - 99.0 FL    MCH 31.3 26.0 - 34.0 PG    MCHC 28.3 (L) 30.0 - 36.5 g/dL    RDW 13.5 11.5 - 14.5 %    PLATELET 820 (L) 305 - 400 K/uL    MPV 12.6 8.9 - 12.9 FL    NRBC 0.0 0  WBC    ABSOLUTE NRBC 0.00 0.00 - 0.01 K/uL   CK W/ CKMB & INDEX    Collection Time: 11/01/20  4:33 AM   Result Value Ref Range    CK - MB <1.0 <3.6 NG/ML    CK-MB Index Cannot be calculated 0.0 - 2.5      CK 24 (L) 26 - 192 U/L   TROPONIN I    Collection Time: 11/01/20  4:33 AM   Result Value Ref Range    Troponin-I, Qt. <0.05 <0.05 ng/mL   POC EG7    Collection Time: 11/01/20  9:57 AM   Result Value Ref Range    Calcium, ionized (POC) 1.18 1.12 - 1.32 mmol/L    FIO2 (POC) 40 %    pH (POC) 7.47 (H) 7.35 - 7.45      pCO2 (POC) 76.0 (H) 35.0 - 45.0 MMHG    pO2 (POC) 75 (L) 80 - 100 MMHG    HCO3 (POC) 55.4 (H) 22 - 26 MMOL/L    Base excess (POC) >30 mmol/L sO2 (POC) 95 92 - 97 %    Site RIGHT RADIAL      Device: BIPAP      PEEP/CPAP (POC) 6 cmH2O    PIP (POC) 16      Allens test (POC) YES      Specimen type (POC) ARTERIAL      Total resp.  rate 28         Imaging:  I have personally reviewed the patients radiographs and have reviewed the reports:  Central pulmonary vascular congestion         COREEN Douglas

## 2020-11-01 NOTE — ED PROVIDER NOTES
EMERGENCY DEPARTMENT HISTORY AND PHYSICAL EXAM      Date: 10/31/2020  Patient Name: Yareli Jones    History of Presenting Illness     Chief Complaint   Patient presents with    Leg Pain    Altered mental status       History Provided By: EMS    HPI: Yareli Jones, 63 y.o. female presents to the ED with cc of AMS. Per EMS they were called to the patient's home by the patient's daughter secondary to altered mental status. They had awoken the patient this evening to check on her and at that time the patient did not recognize her daughter or her granddaughter which concerned the family. Per EMS they also state the patient had been complaining of left knee pain. And patient has a history of chronic knee pain and at the time the patient arrived to the emergency department she was not having any pain or discomfort. Patient is bedridden secondary to chronic knee issues and is not a candidate for knee replacement. She does have a history of heart failure and it was noted the patient had generalized edema to entire body which she states is also at baseline for her. There are no reports of fever, cough, or cold symptoms. She denies any chest pain or shortness of breath time she arrived to the emergency department she is on 3 L by nasal cannula at home. She denies any abdominal pain, nausea, vomiting or diarrhea. He denies any urinary symptoms. There are no other complaints, changes, or physical findings at this time. PCP: Payton Barros NP    No current facility-administered medications on file prior to encounter. Current Outpatient Medications on File Prior to Encounter   Medication Sig Dispense Refill    OXYGEN-AIR DELIVERY SYSTEMS 3 L/min by Nasal route continuous.  bismuth subsalicylate (PEPTO-BISMOL MAXIMUM STRENGTH) 525 mg/15 mL susp Take 30 mL by mouth as needed for Diarrhea.       multivitamin, tx-iron-ca-min (THERA-M w/ IRON) 9 mg iron-400 mcg tab tablet Take 1 Tab by mouth daily.  acetaminophen (TYLENOL) 325 mg tablet Take 650 mg by mouth every six (6) hours as needed for Pain.  nitroglycerin (Nitrostat) 0.4 mg SL tablet 0.4 mg by SubLINGual route every five (5) minutes as needed for Chest Pain. Up to 3 doses.  azithromycin (Zithromax Z-Andrés) 250 mg tablet Z-Andrés use as directed 6 Tab 0    furosemide (LASIX) 40 mg tablet Take 1 Tab by mouth daily. 30 Tab 0    apixaban (Eliquis) 5 mg tablet Take 1 Tab by mouth two (2) times a day. 60 Tab 0    atorvastatin (LIPITOR) 10 mg tablet Take 10 mg by mouth nightly.  metoprolol (LOPRESSOR) 25 mg tablet Take 25 mg by mouth two (2) times a day. Past History     Past Medical History:  Past Medical History:   Diagnosis Date    [facility]     [facility]     Hypertension     Ill-defined condition     high cholesterol    Ill-defined condition     tachycardia    Other and unspecified symptoms and signs involving general sensations and perceptions     ruptured disc       Past Surgical History:  History reviewed. No pertinent surgical history. Family History:  History reviewed. No pertinent family history. Social History:  Social History     Tobacco Use    Smoking status: Former Smoker    Smokeless tobacco: Never Used   Substance Use Topics    Alcohol use: No    Drug use: No       Allergies: Allergies   Allergen Reactions    Cefazolin Rash    Other Plant, Animal, Environmental Rash     Ivory soap caused rash on hands. Review of Systems   Review of Systems   Constitutional: Negative. Negative for appetite change, chills, fatigue and fever. HENT: Negative. Negative for congestion, rhinorrhea, sinus pressure and sore throat. Eyes: Negative. Respiratory: Negative. Negative for cough, choking, chest tightness, shortness of breath and wheezing. Cardiovascular: Positive for leg swelling (Anasarca- chronic). Negative for chest pain and palpitations. Gastrointestinal: Negative for abdominal pain, constipation, diarrhea, nausea and vomiting. Endocrine: Negative. Genitourinary: Negative. Negative for difficulty urinating, dysuria, flank pain and urgency. Musculoskeletal:        Left knee pain- chronic, no pain currently      Skin: Negative. Neurological: Negative. Negative for dizziness, speech difficulty, weakness, light-headedness, numbness and headaches. Psychiatric/Behavioral: Positive for confusion. All other systems reviewed and are negative. Physical Exam   Physical Exam  Vitals signs and nursing note reviewed. Constitutional:       General: She is not in acute distress. Appearance: She is well-developed. She is not diaphoretic. Comments: Morbidly obese, somnolent, falling asleep often        HENT:      Head: Normocephalic and atraumatic. Mouth/Throat:      Mouth: Mucous membranes are moist.      Pharynx: No oropharyngeal exudate. Eyes:      Extraocular Movements: Extraocular movements intact. Conjunctiva/sclera: Conjunctivae normal.      Pupils: Pupils are equal, round, and reactive to light. Neck:      Musculoskeletal: Normal range of motion and neck supple. Vascular: No JVD. Trachea: No tracheal deviation. Cardiovascular:      Rate and Rhythm: Normal rate and regular rhythm. Heart sounds: Normal heart sounds. No murmur. Pulmonary:      Effort: Pulmonary effort is normal. No respiratory distress. Breath sounds: Normal breath sounds. No stridor. No wheezing or rales. Comments: Diminished in bases, limited by body habitus    Abdominal:      General: There is no distension. Palpations: Abdomen is soft. Tenderness: There is no abdominal tenderness. There is no guarding or rebound. Musculoskeletal: Normal range of motion. General: No tenderness. Comments: Bilateral upper and lower extremity     Skin:     General: Skin is warm and dry.       Capillary Refill: Capillary refill takes less than 2 seconds. Neurological:      Mental Status: She is alert and oriented to person, place, and time. Cranial Nerves: No cranial nerve deficit. Comments: No gross motor or sensory deficits    Psychiatric:         Behavior: Behavior normal.      Comments: Flat affect           Diagnostic Study Results     Labs -     Recent Results (from the past 12 hour(s))   CBC WITH AUTOMATED DIFF    Collection Time: 10/31/20  9:38 PM   Result Value Ref Range    WBC 9.3 3.6 - 11.0 K/uL    RBC 4.14 3.80 - 5.20 M/uL    HGB 13.0 11.5 - 16.0 g/dL    HCT 45.6 35.0 - 47.0 %    .1 (H) 80.0 - 99.0 FL    MCH 31.4 26.0 - 34.0 PG    MCHC 28.5 (L) 30.0 - 36.5 g/dL    RDW 13.9 11.5 - 14.5 %    PLATELET 731 (L) 337 - 400 K/uL    MPV 12.8 8.9 - 12.9 FL    NRBC 0.0 0  WBC    ABSOLUTE NRBC 0.00 0.00 - 0.01 K/uL    NEUTROPHILS 73 32 - 75 %    LYMPHOCYTES 17 12 - 49 %    MONOCYTES 7 5 - 13 %    EOSINOPHILS 2 0 - 7 %    BASOPHILS 0 0 - 1 %    IMMATURE GRANULOCYTES 1 (H) 0.0 - 0.5 %    ABS. NEUTROPHILS 6.7 1.8 - 8.0 K/UL    ABS. LYMPHOCYTES 1.6 0.8 - 3.5 K/UL    ABS. MONOCYTES 0.7 0.0 - 1.0 K/UL    ABS. EOSINOPHILS 0.2 0.0 - 0.4 K/UL    ABS. BASOPHILS 0.0 0.0 - 0.1 K/UL    ABS. IMM. GRANS. 0.1 (H) 0.00 - 0.04 K/UL    DF SMEAR SCANNED      RBC COMMENTS MACROCYTOSIS  1+        RBC COMMENTS STOMATOCYTES  1+        RBC COMMENTS HYPOCHROMIA  PRESENT        RBC COMMENTS BASOPHILIC STIPPLING  PRESENT       SAMPLES BEING HELD    Collection Time: 10/31/20  9:38 PM   Result Value Ref Range    SAMPLES BEING HELD 1BLU     COMMENT        Add-on orders for these samples will be processed based on acceptable specimen integrity and analyte stability, which may vary by analyte.    METABOLIC PANEL, COMPREHENSIVE    Collection Time: 10/31/20 10:40 PM   Result Value Ref Range    Sodium 139 136 - 145 mmol/L    Potassium 4.3 3.5 - 5.1 mmol/L    Chloride 92 (L) 97 - 108 mmol/L    CO2 >45 (HH) 21 - 32 mmol/L    Anion gap Cannot be calculated 5 - 15 mmol/L    Glucose 133 (H) 65 - 100 mg/dL    BUN 20 6 - 20 MG/DL    Creatinine 0.52 (L) 0.55 - 1.02 MG/DL    BUN/Creatinine ratio 38 (H) 12 - 20      GFR est AA >60 >60 ml/min/1.73m2    GFR est non-AA >60 >60 ml/min/1.73m2    Calcium 9.1 8.5 - 10.1 MG/DL    Bilirubin, total 0.7 0.2 - 1.0 MG/DL    ALT (SGPT) 27 12 - 78 U/L    AST (SGOT) 43 (H) 15 - 37 U/L    Alk. phosphatase 90 45 - 117 U/L    Protein, total 7.4 6.4 - 8.2 g/dL    Albumin 2.7 (L) 3.5 - 5.0 g/dL    Globulin 4.7 (H) 2.0 - 4.0 g/dL    A-G Ratio 0.6 (L) 1.1 - 2.2         Radiologic Studies -   XR CHEST PORT   Final Result   IMPRESSION:    No acute process. Stable cardiac silhouette enlargement with central pulmonary   vascular congestion. CT Results  (Last 48 hours)    None        CXR Results  (Last 48 hours)    None          Medical Decision Making   I am the first provider for this patient. I reviewed the vital signs, available nursing notes, past medical history, past surgical history, family history and social history. Vital Signs-Reviewed the patient's vital signs.   Patient Vitals for the past 12 hrs:   Temp Pulse Resp BP SpO2   10/31/20 2315 98 °F (36.7 °C) 83 27 (!) 148/66 100 %   10/31/20 2300  85 26 (!) 140/63 98 %   10/31/20 2245  88 24 (!) 144/68 100 %   10/31/20 2230  83 22 (!) 149/58 96 %   10/31/20 2215  83 20 (!) 165/60 100 %   10/31/20 2200  84 19 (!) 185/151 100 %   10/31/20 2145  84 22 (!) 141/65 92 %   10/31/20 2130  83 26 (!) 147/66 99 %   10/31/20 2115  84 26 (!) 151/62 98 %   10/31/20 2100  86 22 (!) 142/65 98 %   10/31/20 2035 98.3 °F (36.8 °C) 82 22 (!) 122/50 99 %       EKG interpretation: (Preliminary)  NSR, rate 76, normal axis/pr/qrs, no acute ST changes, Armando Glance, DO    Records Reviewed: Nursing Notes, Old Medical Records, Previous electrocardiograms, Ambulance Run Sheet, Previous Radiology Studies and Previous Laboratory Studies    Provider Notes (Medical Decision Making):   DDx- Transient alteration of awareness, electrolyte abnormality, UTI, pneumonia, Hypercarbia    ED Course:   Initial assessment performed. The patients presenting problems have been discussed, and they are in agreement with the care plan formulated and outlined with them. I have encouraged them to ask questions as they arise throughout their visit. 11:50 PM  Given confusion, metabolic panel initially ordered to evaluate for electrolyte abnormality. Pt CO2> 45, will order ABG to evaluate for Compensated or uncompensated. ABG shows resp acidosis pCO2> 90, will order BiPAP.       0100 AM  Case discussed with Dr. Yara Poole. He will evaluate and admit. 105 AM   Pt looking better, more awake and alert. Tolerating BiPAP well. Critical Care Time:   CRITICAL CARE NOTE :    11:49 PM    IMPENDING DETERIORATION -Respiratory and CNS  ASSOCIATED RISK FACTORS - Metabolic changes and CNS Decompensation  MANAGEMENT- Bedside Assessment and Supervision of Care  INTERPRETATION -  Xrays, Blood Gases, ECG, Blood Pressure, Cardiac Output Measures  and labs  INTERVENTIONS - BiPAP  CASE REVIEW - Hospitalist and Nursing  TREATMENT RESPONSE -Stable  PERFORMED BY - Self    NOTES   :  I have spent 40 minutes of critical care time involved in lab review, consultations with specialist, family decision- making, bedside attention and documentation. This time excludes time spent in any separate billed procedures. During this entire length of time I was immediately available to the patient . Cheryle Fisher DO      Disposition:  Admit      Diagnosis     Clinical Impression:   1. Altered mental status, unspecified altered mental status type    2. Acute respiratory failure with hypercapnia (HCC)    3. Acute on chronic respiratory acidosis        Attestations:    Cheryle Fisher DO    Please note that this dictation was completed with [employer], the computer voice recognition software.   Quite often unanticipated grammatical, syntax, homophones, and other interpretive errors are inadvertently transcribed by the computer software. Please disregard these errors. Please excuse any errors that have escaped final proofreading. Thank you.

## 2020-11-01 NOTE — PROGRESS NOTES
Received notification from bedside RN about patient with regards to: new onset SVT -160  VS: , /50, RR 24, O2 100% BIPAP  Intervention given: STAT EKG  Rapid Response Team Note    Called by RN at 6:43 AM to see patient for CP, sustained SVT stat. Upon entering the room the patient was complaining of chest discomfort, denies associated nausea, diaphoresis and feeling clammy. Events as described by RN caring for patient noted started to have HR elevation aroun 0552. Visit Vitals  BP (!) 111/54   Pulse (!) 133   Temp 99.5 °F (37.5 °C)   Resp 22   Ht 5' 3\" (1.6 m)   Wt 140.6 kg (310 lb)   SpO2 94%   BMI 54.91 kg/m²       Gen: responsive, in no acute distress  HEENT: WNL  Chest:  symmetrical  Abd: soft, non tender  Neuro: AAOx3  Ext: RUIZ    Pertinent Recent Labs and Xrays:  Recent Labs     11/01/20  0433 10/31/20  2138   WBC 7.6 9.3   HGB 11.9 13.0   HCT 42.0 45.6   * 139*     Recent Labs     11/01/20  0433 10/31/20  2240    139   K 3.9 4.3   CL 86* 92*   CO2 >45* >45*   BUN 18 20   CREA 0.46* 0.52*    133*   CA 9.6 9.1   MG  --  2.2     No results for input(s): INR, INREXT in the last 72 hours. No results for input(s): PH, PCO2, PO2 in the last 72 hours. No results for input(s): PHI, PO2I, PCO2I in the last 72 hours.   Recent Labs     10/31/20  2240   TROIQ <0.05     Lab Results   Component Value Date/Time    Glucose (POC) 140 (H) 08/07/2020 10:54 AM    Glucose (POC) 74 08/07/2020 06:33 AM    Glucose (POC) 158 (H) 08/06/2020 08:36 PM    Glucose (POC) 159 (H) 08/06/2020 04:32 PM    Glucose (POC) 109 (H) 08/06/2020 11:03 AM           A/P:  Chest pain  SVT  - EKG  - cardiac enzyme, troponin  - Cardizem 5 mg x2  - Nitro paste 1 inch x1 (held for low BP)  - Digoxin 125 mcg  - will defer to AM provider for need for cardiology consultation        Gisela Corona NP  Critical care time unrelated to prior notes: 40

## 2020-11-01 NOTE — PROGRESS NOTES
0730 Bedside shift change report given to [address] (oncoming nurse) by Marlon Radford (offgoing nurse). Report included the following information SBAR, Kardex, ED Summary, Intake/Output, MAR, Recent Results and Cardiac Rhythm Rapid A. Fib since 0600.     1218 Spoke with Pt's daughter Arthur and gave her an update on Pt's status.

## 2020-11-01 NOTE — PROGRESS NOTES
0: Primary Nurse Meena Talbert RN and Anayeli Jolley RN performed a dual skin assessment on this patient Impairment noted- see wound doc flow sheet  Gaston score is 11      *Fissure to gluteal cleft with surrounding skin bright blanchable redness and areas of purple in color that are non blanchable. Bilateral heels red and slow to mau. Excoriation underneath both breasts and abdominal folds. Dry/Flaky skin, bilateral elbows blanchable redness    0427: Wound care consult placed    0500: pt states she wants her daughter to be notified of her admission but states that her daughter and the pt. Both had their phones taken \"by the people who give us food stamps. \" and pt. Is unaware of daughter or sisters phone number    0515: Notified pts. Daughter Arthur of pts. Admission and room number. Daughter stated her phone number will be changing since her phone is being disconnected and will call nursing station with updated phone number. 0556: HR increased to 155-160bm and sustaining. Perfect serve Yovana, NP    0600: Orders for stat EKG. During EKG pt complaining of 7/10 \"burning in chest\". RRT Chest pain called. 1212: Orders for IV cardizem 5mg push stat from Zeeshan Mayer, NP.     9708: Verbal orders to give another 5mg IV Push cardizem    0645: 2nd dose of IV Push cardizem given. 0700: Bedside shift change report given to Dwayne Shukla [address] (oncoming nurse) by Radha Golden (offgoing nurse). Report included the following information SBAR, Kardex, Procedure Summary, Intake/Output, MAR, Recent Results and Cardiac Rhythm Afib RVR.

## 2020-11-01 NOTE — PROGRESS NOTES
0310: TRANSFER - IN REPORT:    Verbal report received from MARIE Giraldo(name) on Miriam Marie  being received from ED (unit) for routine progression of care      Report consisted of patients Situation, Background, Assessment and   Recommendations(SBAR). Information from the following report(s) SBAR, Kardex, Procedure Summary, Intake/Output, MAR, Recent Results and Cardiac Rhythm NSR was reviewed with the receiving nurse. Opportunity for questions and clarification was provided. Assessment completed upon patients arrival to unit and care assumed.

## 2020-11-01 NOTE — PROGRESS NOTES
Pt examined, not wheezing  Quit smoking 40 years ago  Needs diuresis  Needs out pt JOSLYN/[facility] w/u  Stephania Birmingham MD

## 2020-11-01 NOTE — CONSULTS
Cardiology Consult Note       Date of  Admission: 10/31/2020  8:21 PM     Admission type:Emergency     Subjective: The patient is a 63years old woman with past medical history of substance apnea, COPD, diastolic heart failure, obesity, diabetes mellitus type 2, paroxysmal A. Fib. She presented to the emergency department by EMS after they were called by the patient's daughter due to change in mentation. noted to be in afib with RVR. Placed on amiodarone drip. Now she is sinus rhythm. She denies any previous cardiac history. Echo 7/20 showed normal function. She denies any chest pain, palpitations. Was seen by pulmonary today, they do not feel she has COPD. Patient Active Problem List    Diagnosis Date Noted    Acute respiratory failure with hypercapnia ([address] 11/01/2020    COVID-19 ruled out 07/24/2020      Baljinder Brooks NP  Past Medical History:   Diagnosis Date    CHF (congestive heart failure) ([address]     Diabetes ([facility] [address]     Hypertension     Ill-defined condition     high cholesterol    Ill-defined condition     tachycardia    Other and unspecified symptoms and signs involving general sensations and perceptions     ruptured disc      History reviewed. No pertinent surgical history. Allergies   Allergen Reactions    Cefazolin Rash    Other Plant, Animal, Environmental Rash     Ivory soap caused rash on hands. History reviewed. No pertinent family history.    Current Facility-Administered Medications   Medication Dose Route Frequency    acetaminophen (TYLENOL) tablet 650 mg  650 mg Oral Q6H PRN    atorvastatin (LIPITOR) tablet 10 mg  10 mg Oral QHS    metoprolol tartrate (LOPRESSOR) tablet 25 mg  25 mg Oral BID    nitroglycerin (NITROSTAT) tablet 0.4 mg  0.4 mg SubLINGual Q5MIN PRN    sodium chloride (NS) flush 5-40 mL  5-40 mL IntraVENous Q8H    sodium chloride (NS) flush 5-40 mL  5-40 mL IntraVENous PRN    acetaminophen (TYLENOL) tablet 650 mg  650 mg Oral Q6H PRN    Or    acetaminophen (TYLENOL) suppository 650 mg  650 mg Rectal Q6H PRN    polyethylene glycol (MIRALAX) packet 17 g  17 g Oral DAILY PRN    promethazine (PHENERGAN) tablet 12.5 mg  12.5 mg Oral Q6H PRN    Or    ondansetron (ZOFRAN) injection 4 mg  4 mg IntraVENous Q6H PRN    albuterol-ipratropium (DUO-NEB) 2.5 MG-0.5 MG/3 ML  3 mL Nebulization Q4H RT    furosemide (LASIX) injection 40 mg  40 mg IntraVENous BID    amiodarone (CORDARONE) 375 mg/250 mL D5W infusion  0.5-1 mg/min IntraVENous TITRATE    enoxaparin (LOVENOX) injection ++ PARTIAL SYRINGE ++ 140 mg  1 mg/kg SubCUTAneous Q12H         Review of Symptoms:  A comprehensive review of systems was negative except for that written in the HPI. Physical Exam    Visit Vitals  BP (!) 119/43   Pulse 90   Temp 98.8 °F (37.1 °C)   Resp 20   Ht 5' 3\" (1.6 m)   Wt 310 lb (140.6 kg)   SpO2 94%   BMI 54.91 kg/m²     General Appearance: Morbidly obese, alert and oriented x 3, and individual in no acute distress. Ears/Nose/Mouth/Throat:   Hearing grossly normal.         Neck: Supple. Chest:   Lungs clear to auscultation bilaterally. Cardiovascular:  Regular rate and rhythm, S1, S2 normal, no murmur. Abdomen:   Soft, non-tender, bowel sounds are active. Extremities: Trace edema bilaterally. Skin: Warm and dry.                Cardiographics    Telemetry: normal sinus rhythm  ECG:  atrial fibrillation, rate 120  Echocardiogram: Not done    Labs:   Recent Results (from the past 24 hour(s))   CBC WITH AUTOMATED DIFF    Collection Time: 10/31/20  9:38 PM   Result Value Ref Range    WBC 9.3 3.6 - 11.0 K/uL    RBC 4.14 3.80 - 5.20 M/uL    HGB 13.0 11.5 - 16.0 g/dL    HCT 45.6 35.0 - 47.0 %    .1 (H) 80.0 - 99.0 FL    MCH 31.4 26.0 - 34.0 PG    MCHC 28.5 (L) 30.0 - 36.5 g/dL    RDW 13.9 11.5 - 14.5 %    PLATELET 851 (L) 283 - 400 K/uL    MPV 12.8 8.9 - 12.9 FL    NRBC 0.0 0  WBC    ABSOLUTE NRBC 0.00 0.00 - 0.01 K/uL    NEUTROPHILS 73 32 - 75 % LYMPHOCYTES 17 12 - 49 %    MONOCYTES 7 5 - 13 %    EOSINOPHILS 2 0 - 7 %    BASOPHILS 0 0 - 1 %    IMMATURE GRANULOCYTES 1 (H) 0.0 - 0.5 %    ABS. NEUTROPHILS 6.7 1.8 - 8.0 K/UL    ABS. LYMPHOCYTES 1.6 0.8 - 3.5 K/UL    ABS. MONOCYTES 0.7 0.0 - 1.0 K/UL    ABS. EOSINOPHILS 0.2 0.0 - 0.4 K/UL    ABS. BASOPHILS 0.0 0.0 - 0.1 K/UL    ABS. IMM. GRANS. 0.1 (H) 0.00 - 0.04 K/UL    DF SMEAR SCANNED      RBC COMMENTS MACROCYTOSIS  1+        RBC COMMENTS STOMATOCYTES  1+        RBC COMMENTS HYPOCHROMIA  PRESENT        RBC COMMENTS BASOPHILIC STIPPLING  PRESENT       SAMPLES BEING HELD    Collection Time: 10/31/20  9:38 PM   Result Value Ref Range    SAMPLES BEING HELD 1BLU     COMMENT        Add-on orders for these samples will be processed based on acceptable specimen integrity and analyte stability, which may vary by analyte. METABOLIC PANEL, COMPREHENSIVE    Collection Time: 10/31/20 10:40 PM   Result Value Ref Range    Sodium 139 136 - 145 mmol/L    Potassium 4.3 3.5 - 5.1 mmol/L    Chloride 92 (L) 97 - 108 mmol/L    CO2 >45 (HH) 21 - 32 mmol/L    Anion gap Cannot be calculated 5 - 15 mmol/L    Glucose 133 (H) 65 - 100 mg/dL    BUN 20 6 - 20 MG/DL    Creatinine 0.52 (L) 0.55 - 1.02 MG/DL    BUN/Creatinine ratio 38 (H) 12 - 20      GFR est AA >60 >60 ml/min/1.73m2    GFR est non-AA >60 >60 ml/min/1.73m2    Calcium 9.1 8.5 - 10.1 MG/DL    Bilirubin, total 0.7 0.2 - 1.0 MG/DL    ALT (SGPT) 27 12 - 78 U/L    AST (SGOT) 43 (H) 15 - 37 U/L    Alk.  phosphatase 90 45 - 117 U/L    Protein, total 7.4 6.4 - 8.2 g/dL    Albumin 2.7 (L) 3.5 - 5.0 g/dL    Globulin 4.7 (H) 2.0 - 4.0 g/dL    A-G Ratio 0.6 (L) 1.1 - 2.2     TROPONIN I    Collection Time: 10/31/20 10:40 PM   Result Value Ref Range    Troponin-I, Qt. <0.05 <0.05 ng/mL   NT-PRO BNP    Collection Time: 10/31/20 10:40 PM   Result Value Ref Range    NT pro-BNP 1,082 (H) <125 PG/ML   MAGNESIUM    Collection Time: 10/31/20 10:40 PM   Result Value Ref Range    Magnesium 2.2 1.6 - 2.4 mg/dL   EKG, 12 LEAD, INITIAL    Collection Time: 11/01/20 12:15 AM   Result Value Ref Range    Ventricular Rate 78 BPM    Atrial Rate 78 BPM    P-R Interval 148 ms    QRS Duration 82 ms    Q-T Interval 400 ms    QTC Calculation (Bezet) 456 ms    Calculated P Axis 52 degrees    Calculated R Axis 56 degrees    Calculated T Axis 47 degrees    Diagnosis       Normal sinus rhythm  Normal ECG  When compared with ECG of 14-AUG-2020 12:34,  premature atrial complexes are no longer present  T wave inversion no longer evident in Inferior leads     URINALYSIS W/ REFLEX CULTURE    Collection Time: 11/01/20 12:24 AM    Specimen: Urine   Result Value Ref Range    Color YELLOW/STRAW      Appearance CLEAR CLEAR      Specific gravity 1.016 1.003 - 1.030      pH (UA) 5.5 5.0 - 8.0      Protein Negative NEG mg/dL    Glucose Negative NEG mg/dL    Ketone Negative NEG mg/dL    Bilirubin Negative NEG      Blood Negative NEG      Urobilinogen 1.0 0.2 - 1.0 EU/dL    Nitrites Positive (A) NEG      Leukocyte Esterase Negative NEG      WBC 0-4 0 - 4 /hpf    RBC 0-5 0 - 5 /hpf    Epithelial cells FEW FEW /lpf    Bacteria Negative NEG /hpf    UA:UC IF INDICATED CULTURE NOT INDICATED BY UA RESULT CNI     METABOLIC PANEL, BASIC    Collection Time: 11/01/20  4:33 AM   Result Value Ref Range    Sodium 141 136 - 145 mmol/L    Potassium 3.9 3.5 - 5.1 mmol/L    Chloride 86 (L) 97 - 108 mmol/L    CO2 >45 (HH) 21 - 32 mmol/L    Anion gap Cannot be calculated 5 - 15 mmol/L    Glucose 100 65 - 100 mg/dL    BUN 18 6 - 20 MG/DL    Creatinine 0.46 (L) 0.55 - 1.02 MG/DL    BUN/Creatinine ratio 39 (H) 12 - 20      GFR est AA >60 >60 ml/min/1.73m2    GFR est non-AA >60 >60 ml/min/1.73m2    Calcium 9.6 8.5 - 10.1 MG/DL   CBC W/O DIFF    Collection Time: 11/01/20  4:33 AM   Result Value Ref Range    WBC 7.6 3.6 - 11.0 K/uL    RBC 3.80 3.80 - 5.20 M/uL    HGB 11.9 11.5 - 16.0 g/dL    HCT 42.0 35.0 - 47.0 %    .5 (H) 80.0 - 99.0 FL    MCH 31.3 26.0 - 34.0 PG    MCHC 28.3 (L) 30.0 - 36.5 g/dL    RDW 13.5 11.5 - 14.5 %    PLATELET 254 (L) 202 - 400 K/uL    MPV 12.6 8.9 - 12.9 FL    NRBC 0.0 0  WBC    ABSOLUTE NRBC 0.00 0.00 - 0.01 K/uL   CK W/ CKMB & INDEX    Collection Time: 11/01/20  4:33 AM   Result Value Ref Range    CK - MB <1.0 <3.6 NG/ML    CK-MB Index Cannot be calculated 0.0 - 2.5      CK 24 (L) 26 - 192 U/L   TROPONIN I    Collection Time: 11/01/20  4:33 AM   Result Value Ref Range    Troponin-I, Qt. <0.05 <0.05 ng/mL   POC EG7    Collection Time: 11/01/20  9:57 AM   Result Value Ref Range    Calcium, ionized (POC) 1.18 1.12 - 1.32 mmol/L    FIO2 (POC) 40 %    pH (POC) 7.47 (H) 7.35 - 7.45      pCO2 (POC) 76.0 (H) 35.0 - 45.0 MMHG    pO2 (POC) 75 (L) 80 - 100 MMHG    HCO3 (POC) 55.4 (H) 22 - 26 MMOL/L    Base excess (POC) >30 mmol/L    sO2 (POC) 95 92 - 97 %    Site RIGHT RADIAL      Device: BIPAP      PEEP/CPAP (POC) 6 cmH2O    PIP (POC) 16      Allens test (POC) YES      Specimen type (POC) ARTERIAL      Total resp. rate 28          Assessment:     Assessment:         Hospital Problems  Never Reviewed          Codes Class Noted POA    Acute respiratory failure with hypercapnia ([facility] [address] ICD-10-CM: J96.02  ICD-9-CM: 518.81  11/1/2020 Unknown               Plan:     Atrial fibrillation- paroxysmal. Probably precipitated by underlying respiratory failure. Discontinue IV amiodarone. Start her on oral cardizem. Continue metoprolol. Consider stress test tomorrow. Has significant risk factors for CAD. Thank you for the consult. Will follow.     Charity Jarquin MD

## 2020-11-01 NOTE — H&P
Hospitalist Admission Note    NAME: Kike Kinney   :  1960   MRN:  [id number]     Date/Time:  2020 1:55 AM    Patient PCP: Courtney German NP  ______________________________________________________________________  Given the patient's current clinical presentation, I have a high level of concern for decompensation if discharged from the emergency department. Complex decision making was performed, which includes reviewing the patient's available past medical records, laboratory results, and x-ray films. My assessment of this patient's clinical condition and my plan of care is as follows.     Assessment / Plan:    Acute encephalopathy secondary to hypercapnia, POA  Acute respiratory failure with hypercapnia, POA  Likely secondary to combination of COPD, diastolic heart failure, obstructive sleep apnea, obesity hypoventilation syndrome  Chronically on oxygen 3 to 4 L via nasal cannula at home  Required BiPAP here  Mentation improved after placed on BiPAP  DuoNeb, budesonidearformoterol, prednisone  Change Lasix to IV  Mirant  JAILENE's  Most recent echo 2020 EF with 48 to 55%  Chest x-ray with pulmonary vascular congestion but no infiltrates evident    Paroxysmal A. fib  Rate controlled  Continue Eliquis    Diabetes mellitus type 2  Most recent A1c 6%  Not on any medication  Lifestyle modification    Impaired mobility, POA  Secondary to chronic DJD of the knees and morbid obesity    Morbid obesity  Counseling was provided about weight loss    Code Status: Full code    DVT Prophylaxis: Anticoagulated with Eliquis  GI Prophylaxis: not indicated    Baseline: Chronically bedridden due to above      Subjective:   CHIEF COMPLAINT: Change in mentation    HISTORY OF PRESENT ILLNESS:       The patient is a 63years old woman with past medical history of substance apnea, COPD, diastolic heart failure, obesity, diabetes mellitus type 2, paroxysmal A. fib presented emergency department by EMS after they were called by the patient's daughter due to change in mentation. No family available at bedside, history was obtained from EMR and ED physician. Was reported that the family was trying to waking up the patient however she was not responding to them. They called the EMS. When I saw the patient she was alert oriented x4 reported that she can tomorrow happen but she has not been having any fevers, chills, cough, nausea, vomiting, diarrhea, chest pain, abdominal pain or any other symptoms. In the ED, patient was found to have CO2 more than 45 on BMP for which she was placed on BiPAP and her symptoms resolved. We were asked to admit for work up and evaluation of the above problems. Past Medical History:   Diagnosis Date    [facility]estive [facility] failure) ([facility] [address]     Diabetes ([facility] [address]     Hypertension     Ill-defined condition     high cholesterol    Ill-defined condition     tachycardia    Other and unspecified symptoms and signs involving general sensations and perceptions     ruptured disc        History reviewed. No pertinent surgical history. Social History     Tobacco Use    Smoking status: Former Smoker    Smokeless tobacco: Never Used   Substance Use Topics    Alcohol use: No        History reviewed. No pertinent family history. Allergies   Allergen Reactions    Cefazolin Rash    Other Plant, Animal, Environmental Rash     Ivory soap caused rash on hands. Prior to Admission medications    Medication Sig Start Date End Date Taking? Authorizing Provider   OXYGEN-AIR DELIVERY SYSTEMS 3 L/min by Nasal route continuous. 8/31/20   Tico Johnson, NP   bismuth subsalicylate (PEPTO-BISMOL MAXIMUM STRENGTH) 525 mg/15 mL susp Take 30 mL by mouth as needed for Diarrhea. Provider, Historical   multivitamin, tx-iron-ca-min (THERA-M w/ IRON) 9 mg iron-400 mcg tab tablet Take 1 Tab by mouth daily.     Provider, Historical   acetaminophen (TYLENOL) 325 mg tablet Take 650 mg by mouth every six (6) hours as needed for Pain. Provider, Historical   nitroglycerin (Nitrostat) 0.4 mg SL tablet 0.4 mg by SubLINGual route every five (5) minutes as needed for Chest Pain. Up to 3 doses. Provider, Historical   azithromycin (Zithromax Z-Andrés) 250 mg tablet Z-Andrés use as directed 8/14/20   Daphne Spring MD   furosemide (LASIX) 40 mg tablet Take 1 Tab by mouth daily. 8/7/20   Mikel Garrett MD   apixaban (Eliquis) 5 mg tablet Take 1 Tab by mouth two (2) times a day. 8/7/20   Mikel Garrett MD   atorvastatin (LIPITOR) 10 mg tablet Take 10 mg by mouth nightly. Russ Cano MD   metoprolol (LOPRESSOR) 25 mg tablet Take 25 mg by mouth two (2) times a day. Other, MD Russ       REVIEW OF SYSTEMS:     I am not able to complete the review of systems because:    The patient is intubated and sedated    The patient has altered mental status due to his acute medical problems    The patient has baseline aphasia from prior stroke(s)    The patient has baseline dementia and is not reliable historian    The patient is in acute medical distress and unable to provide information           Total of 12 systems reviewed as follows:       POSITIVE= underlined text  Negative = text not underlined  General:  fever, chills, sweats, generalized weakness, weight loss/gain,      loss of appetite   Eyes:    blurred vision, eye pain, loss of vision, double vision  ENT:    rhinorrhea, pharyngitis   Respiratory:   cough, sputum production, SOB, CHOPRA, wheezing, pleuritic pain   Cardiology:   chest pain, palpitations, orthopnea, PND, edema, syncope   Gastrointestinal:  abdominal pain , N/V, diarrhea, dysphagia, constipation, bleeding   Genitourinary:  frequency, urgency, dysuria, hematuria, incontinence   Muskuloskeletal :  arthralgia, myalgia, back pain  Hematology:  easy bruising, nose or gum bleeding, lymphadenopathy   Dermatological: rash, ulceration, pruritis, color change / jaundice  Endocrine:   hot flashes or polydipsia   Neurological:  headache, dizziness, confusion, focal weakness, paresthesia,     Speech difficulties, memory loss, gait difficulty  Psychological: Feelings of anxiety, depression, agitation    Objective:   VITALS:    Visit Vitals  BP (!) 140/57   Pulse 81   Temp 98.2 °F (36.8 °C)   Resp 25   Ht 5' 3\" (1.6 m)   Wt 140.6 kg (310 lb)   SpO2 96%   BMI 54.91 kg/m²       PHYSICAL EXAM:    General:    Alert, cooperative, no distress, appears stated age. HEENT: Atraumatic, anicteric sclerae, pink conjunctivae     No oral ulcers, mucosa moist, throat clear, dentition fair  Neck:  Supple, symmetrical,  thyroid: non tender  Lungs:   Clear to auscultation bilaterally. No Wheezing or Rhonchi. No rales. Chest wall:  No tenderness  No Accessory muscle use. Heart:   Regular  rhythm,  No  murmur   No edema  Abdomen:   Soft, non-tender. Not distended. Bowel sounds normal  Extremities: No cyanosis. No clubbing,      Skin turgor normal, Capillary refill normal, Radial dial pulse 2+  Skin:     Not pale. Not Jaundiced  No rashes   Psych:  Good insight. Not depressed. Not anxious or agitated. Neurologic: EOMs intact. No facial asymmetry. No aphasia or slurred speech. Symmetrical strength, Sensation grossly intact.  Alert and oriented X 4.     _______________________________________________________________________  Care Plan discussed with:    Comments   Patient x    Family      RN x    Care Manager                    Consultant:      _______________________________________________________________________  Expected  Disposition:   Home with Family    HH/PT/OT/RN    SNF/LTC x   JUAN M    ________________________________________________________________________  TOTAL TIME:  54 Minutes    Critical Care Provided     Minutes non procedure based      Comments    x Reviewed previous records   >50% of visit spent in counseling and coordination of care x Discussion with patient and/or family and questions answered       ________________________________________________________________________  Signed: Aria Arnold MD    Procedures: see electronic medical records for all procedures/Xrays and details which were not copied into this note but were reviewed prior to creation of Plan. LAB DATA REVIEWED:    Recent Results (from the past 24 hour(s))   CBC WITH AUTOMATED DIFF    Collection Time: 10/31/20  9:38 PM   Result Value Ref Range    WBC 9.3 3.6 - 11.0 K/uL    RBC 4.14 3.80 - 5.20 M/uL    HGB 13.0 11.5 - 16.0 g/dL    HCT 45.6 35.0 - 47.0 %    .1 (H) 80.0 - 99.0 FL    MCH 31.4 26.0 - 34.0 PG    MCHC 28.5 (L) 30.0 - 36.5 g/dL    RDW 13.9 11.5 - 14.5 %    PLATELET 781 (L) 091 - 400 K/uL    MPV 12.8 8.9 - 12.9 FL    NRBC 0.0 0  WBC    ABSOLUTE NRBC 0.00 0.00 - 0.01 K/uL    NEUTROPHILS 73 32 - 75 %    LYMPHOCYTES 17 12 - 49 %    MONOCYTES 7 5 - 13 %    EOSINOPHILS 2 0 - 7 %    BASOPHILS 0 0 - 1 %    IMMATURE GRANULOCYTES 1 (H) 0.0 - 0.5 %    ABS. NEUTROPHILS 6.7 1.8 - 8.0 K/UL    ABS. LYMPHOCYTES 1.6 0.8 - 3.5 K/UL    ABS. MONOCYTES 0.7 0.0 - 1.0 K/UL    ABS. EOSINOPHILS 0.2 0.0 - 0.4 K/UL    ABS. BASOPHILS 0.0 0.0 - 0.1 K/UL    ABS. IMM. GRANS. 0.1 (H) 0.00 - 0.04 K/UL    DF SMEAR SCANNED      RBC COMMENTS MACROCYTOSIS  1+        RBC COMMENTS STOMATOCYTES  1+        RBC COMMENTS HYPOCHROMIA  PRESENT        RBC COMMENTS BASOPHILIC STIPPLING  PRESENT       SAMPLES BEING HELD    Collection Time: 10/31/20  9:38 PM   Result Value Ref Range    SAMPLES BEING HELD 1BLU     COMMENT        Add-on orders for these samples will be processed based on acceptable specimen integrity and analyte stability, which may vary by analyte.    METABOLIC PANEL, COMPREHENSIVE    Collection Time: 10/31/20 10:40 PM   Result Value Ref Range    Sodium 139 136 - 145 mmol/L    Potassium 4.3 3.5 - 5.1 mmol/L    Chloride 92 (L) 97 - 108 mmol/L    CO2 >45 (HH) 21 - 32 mmol/L    Anion gap Cannot be calculated 5 - 15 mmol/L    Glucose 133 (H) 65 - 100 mg/dL    BUN 20 6 - 20 MG/DL    Creatinine 0.52 (L) 0.55 - 1.02 MG/DL    BUN/Creatinine ratio 38 (H) 12 - 20      GFR est AA >60 >60 ml/min/1.73m2    GFR est non-AA >60 >60 ml/min/1.73m2    Calcium 9.1 8.5 - 10.1 MG/DL    Bilirubin, total 0.7 0.2 - 1.0 MG/DL    ALT (SGPT) 27 12 - 78 U/L    AST (SGOT) 43 (H) 15 - 37 U/L    Alk.  phosphatase 90 45 - 117 U/L    Protein, total 7.4 6.4 - 8.2 g/dL    Albumin 2.7 (L) 3.5 - 5.0 g/dL    Globulin 4.7 (H) 2.0 - 4.0 g/dL    A-G Ratio 0.6 (L) 1.1 - 2.2     TROPONIN I    Collection Time: 10/31/20 10:40 PM   Result Value Ref Range    Troponin-I, Qt. <0.05 <0.05 ng/mL   NT-PRO BNP    Collection Time: 10/31/20 10:40 PM   Result Value Ref Range    NT pro-BNP 1,082 (H) <125 PG/ML   MAGNESIUM    Collection Time: 10/31/20 10:40 PM   Result Value Ref Range    Magnesium 2.2 1.6 - 2.4 mg/dL   EKG, 12 LEAD, INITIAL    Collection Time: 11/01/20 12:15 AM   Result Value Ref Range    Ventricular Rate 78 BPM    Atrial Rate 78 BPM    P-R Interval 148 ms    QRS Duration 82 ms    Q-T Interval 400 ms    QTC Calculation (Bezet) 456 ms    Calculated P Axis 52 degrees    Calculated R Axis 56 degrees    Calculated T Axis 47 degrees    Diagnosis       Normal sinus rhythm  Normal ECG  When compared with ECG of 14-AUG-2020 12:34,  premature atrial complexes are no longer present  T wave inversion no longer evident in Inferior leads     URINALYSIS W/ REFLEX CULTURE    Collection Time: 11/01/20  1:24 AM    Specimen: Urine   Result Value Ref Range    Color YELLOW/STRAW      Appearance CLEAR CLEAR      Specific gravity 1.016 1.003 - 1.030      pH (UA) 5.5 5.0 - 8.0      Protein Negative NEG mg/dL    Glucose Negative NEG mg/dL    Ketone Negative NEG mg/dL    Bilirubin Negative NEG      Blood Negative NEG      Urobilinogen 1.0 0.2 - 1.0 EU/dL    Nitrites Positive (A) NEG      Leukocyte Esterase Negative NEG      WBC PENDING /hpf    RBC PENDING /hpf    Epithelial cells PENDING /lpf    Bacteria PENDING /hpf    UA:UC IF INDICATED PENDING

## 2020-11-01 NOTE — ED NOTES
Assumed care of pt from EMS. Pt CC of left leg pain starting today. Pt is bedridden and can't move her legs since her last admission a few months ago. Pt daughter woke her up tonight and pt did not recognize her or her granddaughter. Confusion lasted about 5 mins per EMS. Pt wears 3L NC at baseline. Pt 99% on 3L. Pt on monitor x 3. VSS. Call bell in reach. Will continue to monitor.

## 2020-11-01 NOTE — ED NOTES
TRANSFER - OUT REPORT:    Verbal report given to Gerry(name) on Rashad Gearing  being transferred to [facility](unit) for routine progression of care       Report consisted of patients Situation, Background, Assessment and   Recommendations(SBAR). Information from the following report(s) SBAR, ED Summary, [facility] and Recent Results was reviewed with the receiving nurse. Lines:   Peripheral IV 10/31/20 Right;Mid;Anterior Forearm (Active)   Site Assessment Clean, dry, & intact 10/31/20 2139   Phlebitis Assessment 0 10/31/20 2139   Infiltration Assessment 0 10/31/20 2139   Dressing Status Clean, dry, & intact 10/31/20 2139   Dressing Type Transparent 10/31/20 2139   Hub Color/Line Status Pink;Flushed 10/31/20 2139   Action Taken Blood drawn 10/31/20 2139        Opportunity for questions and clarification was provided.       Patient transported with:   RN   RT

## 2020-11-01 NOTE — PROGRESS NOTES
Specialty bed ordered from [id number] [place] [address] Ave, DTI and fissure on buttocks and upper back  Ordered Envision on [facility] bed frame  Confirmation # [id number]

## 2020-11-02 ENCOUNTER — APPOINTMENT (OUTPATIENT)
Dept: NON INVASIVE DIAGNOSTICS | Age: 60
DRG: 189 | End: 2020-11-02
Attending: INTERNAL MEDICINE
Payer: MEDICARE

## 2020-11-02 ENCOUNTER — APPOINTMENT (OUTPATIENT)
Dept: NUCLEAR MEDICINE | Age: 60
DRG: 189 | End: 2020-11-02
Attending: INTERNAL MEDICINE
Payer: MEDICARE

## 2020-11-02 LAB
ANION GAP SERPL CALC-SCNC: ABNORMAL MMOL/L (ref 5–15)
ARTERIAL PATENCY WRIST A: YES
BASOPHILS # BLD: 0 K/UL (ref 0–0.1)
BASOPHILS NFR BLD: 0 % (ref 0–1)
BDY SITE: ABNORMAL
BUN SERPL-MCNC: 18 MG/DL (ref 6–20)
BUN/CREAT SERPL: 29 (ref 12–20)
CA-I BLD-SCNC: 1.28 MMOL/L (ref 1.12–1.32)
CALCIUM SERPL-MCNC: 9.3 MG/DL (ref 8.5–10.1)
CHLORIDE SERPL-SCNC: 86 MMOL/L (ref 97–108)
CO2 SERPL-SCNC: >45 MMOL/L (ref 21–32)
CREAT SERPL-MCNC: 0.62 MG/DL (ref 0.55–1.02)
DIFFERENTIAL METHOD BLD: ABNORMAL
EOSINOPHIL # BLD: 0.2 K/UL (ref 0–0.4)
EOSINOPHIL NFR BLD: 3 % (ref 0–7)
ERYTHROCYTE [DISTWIDTH] IN BLOOD BY AUTOMATED COUNT: 14.3 % (ref 11.5–14.5)
GAS FLOW.O2 O2 DELIVERY SYS: ABNORMAL L/MIN
GLUCOSE SERPL-MCNC: 102 MG/DL (ref 65–100)
HCT VFR BLD AUTO: 41.4 % (ref 35–47)
HGB BLD-MCNC: 12.3 G/DL (ref 11.5–16)
IMM GRANULOCYTES # BLD AUTO: 0 K/UL (ref 0–0.04)
IMM GRANULOCYTES NFR BLD AUTO: 0 % (ref 0–0.5)
LYMPHOCYTES # BLD: 1.7 K/UL (ref 0.8–3.5)
LYMPHOCYTES NFR BLD: 24 % (ref 12–49)
MCH RBC QN AUTO: 31.5 PG (ref 26–34)
MCHC RBC AUTO-ENTMCNC: 29.7 G/DL (ref 30–36.5)
MCV RBC AUTO: 105.9 FL (ref 80–99)
MONOCYTES # BLD: 0.4 K/UL (ref 0–1)
MONOCYTES NFR BLD: 6 % (ref 5–13)
NEUTS SEG # BLD: 4.9 K/UL (ref 1.8–8)
NEUTS SEG NFR BLD: 67 % (ref 32–75)
NRBC # BLD: 0 K/UL (ref 0–0.01)
NRBC BLD-RTO: 0 PER 100 WBC
PCO2 BLD: >90 MMHG (ref 35–45)
PH BLD: 7.19 [PH] (ref 7.35–7.45)
PLATELET # BLD AUTO: 131 K/UL (ref 150–400)
PMV BLD AUTO: 12.1 FL (ref 8.9–12.9)
PO2 BLD: 114 MMHG (ref 80–100)
POTASSIUM SERPL-SCNC: 3.5 MMOL/L (ref 3.5–5.1)
RBC # BLD AUTO: 3.91 M/UL (ref 3.8–5.2)
SODIUM SERPL-SCNC: 136 MMOL/L (ref 136–145)
SPECIMEN TYPE: ABNORMAL
WBC # BLD AUTO: 7.3 K/UL (ref 3.6–11)

## 2020-11-02 PROCEDURE — A9500 TC99M SESTAMIBI: HCPCS

## 2020-11-02 PROCEDURE — 94640 AIRWAY INHALATION TREATMENT: CPT

## 2020-11-02 PROCEDURE — 74011250637 HC RX REV CODE- 250/637: Performed by: INTERNAL MEDICINE

## 2020-11-02 PROCEDURE — 78452 HT MUSCLE IMAGE SPECT MULT: CPT

## 2020-11-02 PROCEDURE — 94660 CPAP INITIATION&MGMT: CPT

## 2020-11-02 PROCEDURE — 77010033678 HC OXYGEN DAILY

## 2020-11-02 PROCEDURE — 74011000250 HC RX REV CODE- 250: Performed by: INTERNAL MEDICINE

## 2020-11-02 PROCEDURE — 99232 SBSQ HOSP IP/OBS MODERATE 35: CPT | Performed by: INTERNAL MEDICINE

## 2020-11-02 PROCEDURE — 74011250636 HC RX REV CODE- 250/636: Performed by: INTERNAL MEDICINE

## 2020-11-02 PROCEDURE — 65660000001 HC RM ICU INTERMED STEPDOWN

## 2020-11-02 PROCEDURE — 85025 COMPLETE CBC W/AUTO DIFF WBC: CPT

## 2020-11-02 PROCEDURE — 36415 COLL VENOUS BLD VENIPUNCTURE: CPT

## 2020-11-02 PROCEDURE — 80048 BASIC METABOLIC PNL TOTAL CA: CPT

## 2020-11-02 PROCEDURE — 77030038269 HC DRN EXT URIN PURWCK BARD -A

## 2020-11-02 RX ORDER — SODIUM CHLORIDE 0.9 % (FLUSH) 0.9 %
10 SYRINGE (ML) INJECTION AS NEEDED
Status: DISCONTINUED | OUTPATIENT
Start: 2020-11-02 | End: 2020-11-04 | Stop reason: HOSPADM

## 2020-11-02 RX ORDER — ENOXAPARIN SODIUM 150 MG/ML
135 INJECTION SUBCUTANEOUS EVERY 12 HOURS
Status: DISCONTINUED | OUTPATIENT
Start: 2020-11-02 | End: 2020-11-04 | Stop reason: HOSPADM

## 2020-11-02 RX ADMIN — DILTIAZEM HYDROCHLORIDE 120 MG: 120 CAPSULE, COATED, EXTENDED RELEASE ORAL at 15:18

## 2020-11-02 RX ADMIN — Medication 10 ML: at 11:45

## 2020-11-02 RX ADMIN — MICONAZOLE NITRATE: 20 CREAM TOPICAL at 17:48

## 2020-11-02 RX ADMIN — FUROSEMIDE 40 MG: 10 INJECTION, SOLUTION INTRAMUSCULAR; INTRAVENOUS at 15:18

## 2020-11-02 RX ADMIN — METOPROLOL TARTRATE 25 MG: 25 TABLET, FILM COATED ORAL at 15:19

## 2020-11-02 RX ADMIN — IPRATROPIUM BROMIDE AND ALBUTEROL SULFATE 3 ML: .5; 3 SOLUTION RESPIRATORY (INHALATION) at 19:17

## 2020-11-02 RX ADMIN — CASTOR OIL AND BALSAM, PERU: 788; 87 OINTMENT TOPICAL at 17:48

## 2020-11-02 RX ADMIN — IPRATROPIUM BROMIDE AND ALBUTEROL SULFATE 3 ML: .5; 3 SOLUTION RESPIRATORY (INHALATION) at 07:19

## 2020-11-02 RX ADMIN — Medication 10 ML: at 05:44

## 2020-11-02 RX ADMIN — IPRATROPIUM BROMIDE AND ALBUTEROL SULFATE 3 ML: .5; 3 SOLUTION RESPIRATORY (INHALATION) at 04:12

## 2020-11-02 RX ADMIN — CASTOR OIL AND BALSAM, PERU: 788; 87 OINTMENT TOPICAL at 07:30

## 2020-11-02 RX ADMIN — Medication 10 ML: at 21:10

## 2020-11-02 RX ADMIN — ATORVASTATIN CALCIUM 10 MG: 10 TABLET, FILM COATED ORAL at 21:10

## 2020-11-02 RX ADMIN — IPRATROPIUM BROMIDE AND ALBUTEROL SULFATE 3 ML: .5; 3 SOLUTION RESPIRATORY (INHALATION) at 15:09

## 2020-11-02 RX ADMIN — Medication 10 ML: at 15:19

## 2020-11-02 RX ADMIN — REGADENOSON 0.4 MG: 0.08 INJECTION, SOLUTION INTRAVENOUS at 11:45

## 2020-11-02 RX ADMIN — MICONAZOLE NITRATE: 20 CREAM TOPICAL at 07:30

## 2020-11-02 RX ADMIN — IPRATROPIUM BROMIDE AND ALBUTEROL SULFATE 3 ML: .5; 3 SOLUTION RESPIRATORY (INHALATION) at 23:05

## 2020-11-02 RX ADMIN — ENOXAPARIN SODIUM 135 MG: 150 INJECTION SUBCUTANEOUS at 15:33

## 2020-11-02 NOTE — PROGRESS NOTES
Pt will have Resting cardiac study tomorrow morning to complete the Nuc Med cardiac test. There is no prep. Please consult ordering physician regarding diet.

## 2020-11-02 NOTE — PROGRESS NOTES
Pharmacy - Enoxaparin (Lovenox®) Monitoring      Indication: ACS     Current Dose: Enoxaparin 140 mg subcutaneously every 12 hours    Creatinine Clearance (mL/min): >30    Current Weight: 140.6 Kg    Labs:  Recent Labs     11/02/20  0126 11/01/20  0433 10/31/20  2240 10/31/20  2138   CREA 0.62 0.46* 0.52*  --    HGB 12.3 11.9  --  13.0   * 130*  --  139*     Wt Readings from Last 1 Encounters:   11/01/20 142 kg (313 lb 0.9 oz)     Ht Readings from Last 1 Encounters:   10/31/20 160 cm (63\")       Impression/Plan:   Will change to 135 mg every 12 hours for weight 128-142 kg per dosing protocol. Thanks,  Lyndsey Pruett, PHARMD      [email or web address]%[email or web address]. [email or web address]

## 2020-11-02 NOTE — PROGRESS NOTES
Pt stressed with Adal Rosas for [id number] cardiac study. Pt to have scan after 1:00pm today. Please consult ordering physician regarding diet.

## 2020-11-02 NOTE — WOUND CARE
Wound care consult for DTI and fissure int he gluteal cleft and medial buttocks area that was present on admission 10-. Patient also has chronic yeast dermatitis that she struggles with. Last time she was here the Secura Antifungal cream was used for her and we are using it again. Assessment of the gluteal cleft:  DTI on the left side of the medial buttocks and fissure in the gluteal cleft. The skin is open but needs more air. Patient is morbidly obese and she has limited mobility with knee pain that is also chronic and incontinence. See photo below:  
 
 
 
Treatment today: The Purewick was reapplied and the wet chux pulled out from under her. Patient turned to the right side for at least one hour off of the sacrum. The coccyx area / gluteal cleft was treated with the Venelex ointment. Plan: Off load the sacrum - no time on her back - only on the left or right side and the pure wick turned on to manage the incontinence. Venelex for the DTI and Secura antifungal cream for the yeast dermatitis in the skin folds.   
Carmel Snell RN, BSN, [employer]

## 2020-11-02 NOTE — PROGRESS NOTES
Reason for Admission:   AMS//respiratory failure                   RUR Score:          16           Plan for utilizing home health:      Was using [facility] in September    PCP: First and Last name:  Damion Bates   Name of Practice: [facility]   Are you a current patient: Yes/No: yes   Approximate date of last visit: unsure   Can you participate in a virtual visit with your PCP:   Not at present time                    Current Advanced Directive/Advance Care Plan: does not have one in [address] [place] [address]  Pt presently with AMS. Transition of Care Plan:                    -need to clarify most recent PCP sheridan't  -need to contact dtr  -need to clarify name of oxygen company  -may benefit from Palliative Care consult    Care Management Interventions  PCP Verified by CM:  Yes  Transition of Care Consult (CM Consult): Discharge Planning  MyChart Signup: No  Discharge Durable Medical Equipment: No  Physical Therapy Consult: No  Occupational Therapy Consult: No  Speech Therapy Consult: No  Current Support Network: Family Lives Nearby, Lives with Caregiver  Confirm Follow Up Transport: Family  Discharge Location  Discharge Placement: Home

## 2020-11-02 NOTE — PROGRESS NOTES
[address] END OF SHIFT REPORT      Bedside shift change report GIVEN TO MARIE Anderson. Report included the following information SBAR, Kardex, ED Summary, Intake/Output, MAR, Recent Results and Cardiac Rhythm NSR.       SIGNIFICANT CHANGES DURING SHIFT:  Pt was originally in A.Fib. Wound care consulted for tomorrow. CONCERNS TO ADDRESS WITH MD:          [address] NURSING NOTE   Admission Date 10/31/2020   Admission Diagnosis Acute respiratory failure with hypercapnia ([facility] [address] [J96.02]   Consults IP CONSULT TO HOSPITALIST  IP CONSULT TO CARDIOLOGY  IP CONSULT TO PULMONOLOGY      Cardiac Monitoring [x] Yes [] No      Purposeful Hourly Rounding [x] Yes    Anna Score Total Score: 2   Anna score 3 or > [x] Bed Alarm [] Avasys [] 1:1 sitter [] Patient refused (Signed refusal form in chart)   Gaston Score Gaston Score: 12   Gaston score 14 or < [] PMT consult [x] Wound Care consult    [x]  Specialty bed  [] Nutrition consult      Influenza Vaccine             Oxygen needs? [] Room air Oxygen @  []1L    []2L    []3L   []4L    []5L   []6L via  NC   Chronic home O2 use?  [] Yes [] No  Perform O2 challenge test and document in progress note using [employer] (.Homeoxygen)      Last bowel movement Last Bowel Movement Date: 10/31/20      Urinary Catheter       External Female Catheter 11/01/20-Urine Output (mL): 1000 ml     LDAs               Peripheral IV 11/01/20 Anterior;Proximal;Right Forearm (Active)   Site Assessment Clean, dry, & intact 11/01/20 1813   Phlebitis Assessment 0 11/01/20 1813   Infiltration Assessment 0 11/01/20 1813   Dressing Status Clean, dry, & intact 11/01/20 1813   Dressing Type Transparent 11/01/20 1813   Hub Color/Line Status Blue;Flushed 11/01/20 1813          External Female Catheter 11/01/20 (Active)   Site Assessment Clean, dry, & intact 11/01/20 1300   Repositioned Yes 11/01/20 1300   Perineal Care Yes 11/01/20 1300   Wick Changed Yes 11/01/20 1300   Suction Canister/Tubing Changed No 11/01/20 1300   Urine Output (mL) 1000 ml 11/01/20 1200                   Readmission Risk Assessment Tool Score Low Risk            9       Total Score        4 IP Visits Last 12 Months (1-3=4, 4=9, >4=11)    5 Pt. Coverage (Medicare=5 , Medicaid, or Self-Pay=4)        Criteria that do not apply:    Has Seen PCP in Last 6 Months (Yes=3, No=0)    . Living with Significant Other. Assisted Living. LTAC. SNF.  or   Rehab    Patient Length of Stay (>5 days = 3)    Charlson Comorbidity Score (Age + Comorbid Conditions)       Expected Length of Stay - - -   Actual Length of Stay 0

## 2020-11-02 NOTE — PROGRESS NOTES
Hospitalist Progress Note    NAME: Mauricio Krishna   :  1960   MRN:  [id number]       Assessment / Plan:  Acute metabolic encephalopathy  Acute on chronic hypoxic and hypercapnic RF, POA  Acute COPD exacerbation  CXR with no acute process. Stable cardiac silhouette enlargement with central pulmonary vascular congestion. probnp 1K  Mentation improved after pt placed on bipap. PCO2 on admission 88.5. She was weaned off bipap. She was empiric IV abx, prednisone but was discontinued by pulm. Will cont' nebs   IV Lasix, monitor lytes, cr  Appreciate pulmonary's recs     Atrial fib with RVR  HTN  RRT called for CP   Pt started on amiodarone gtt d/t borderline low bp. She converted to NSR. Cardiology evaluated, transitioned to PO cardizem and cont' PTA BB  Echo in 2020 showed EF 50-55%. Mild concentric hypertrophy  She was on eliquis outpt but unable to afford and took herself off  Cont'  lovenox for now  Cardiology considering stress test.  Appreciate cardiology's recs. Hyperlipidemia  Cont' statin    T2DM  Currently not on meds. SSI for now    Morbid obesity / Body mass index is 55.45 kg/m². Code status: Full  Prophylaxis: Lovenox  Recommended Disposition: TBD.  bedbound at baseline. Lives with daughter     Subjective:     Chief Complaint / Reason for Physician Visit  Pt seen, no new complaint. Discussed with RN events overnight. Review of Systems:  Symptom Y/N Comments  Symptom Y/N Comments   Fever/Chills n   Chest Pain n    Poor Appetite    Edema     Cough    Abdominal Pain     Sputum    Joint Pain     SOB/CHOPRA n   Pruritis/Rash     Nausea/vomit    Tolerating PT/OT     Diarrhea    Tolerating Diet     Constipation    Other       Could NOT obtain due to:      Objective:     VITALS:   Last 24hrs VS reviewed since prior progress note.  Most recent are:  Patient Vitals for the past 24 hrs:   Temp Pulse Resp BP SpO2   20 0638 98.7 °F (37.1 °C) 64 20 (!) 112/56 100 % 11/02/20 0414     99 %   11/02/20 0413     99 %   11/02/20 0254 98.1 °F (36.7 °C) 68 20 (!) 145/69 99 %   11/02/20 0250     100 %   11/01/20 2348     100 %   11/01/20 2347     100 %   11/01/20 2337    (!) 119/57    11/01/20 2327 98.9 °F (37.2 °C) 71 20 (!) 125/101 100 %   11/01/20 1949     94 %   11/01/20 1847 98.5 °F (36.9 °C) 89 20 (!) 121/55 91 %   11/01/20 1813  (!) 101  (!) 114/96    11/01/20 1506     93 %   11/01/20 1443 98.9 °F (37.2 °C) 80 20 (!) 120/54 93 %   11/01/20 1418  81  (!) 130/47    11/01/20 1145    (!) 113/58    11/01/20 1139     94 %   11/01/20 1130  87  (!) 96/55 94 %   11/01/20 1123  87  (!) 132/50 93 %   11/01/20 1049  90  (!) 119/43    11/01/20 1045    (!) 116/51    11/01/20 1034 98.8 °F (37.1 °C) 88 20 (!) 118/51 94 %   11/01/20 1015     95 %   11/01/20 1000  90  (!) 119/48 97 %   11/01/20 0945  86  (!) 109/45 98 %       Intake/Output Summary (Last 24 hours) at 11/2/2020 0454  Last data filed at 11/2/2020 0601  Gross per 24 hour   Intake 360 ml   Output 2650 ml   Net -2290 ml        PHYSICAL EXAM:  General: WD, WN. Alert, cooperative, no acute distress    EENT:  EOMI. Anicteric sclerae. MMM  Resp:  CTA bilaterally, no wheezing or rales. No accessory muscle use  CV:  irregular  rhythm,  tachycardic. No edema  GI:  Soft, Non distended, Non tender.  +Bowel sounds  Neurologic:  Alert and oriented X 3, normal speech  Psych:   Not anxious nor agitated  Skin:  No rashes.   No jaundice    Reviewed most current lab test results and cultures  YES  Reviewed most current radiology test results   YES  Review and summation of old records today    NO  Reviewed patient's current orders and MAR    YES  PMH/SH reviewed - no change compared to H&P  ________________________________________________________________________  Care Plan discussed with:    Comments   Patient x    Family      RN x    Care Manager     Consultant Multidiciplinary team rounds were held today with , nursing, pharmacist and clinical coordinator. Patient's plan of care was discussed; medications were reviewed and discharge planning was addressed. ________________________________________________________________________  Total NON critical care TIME:   35  Minutes    Total CRITICAL CARE TIME Spent:  Minutes non procedure based      Comments   >50% of visit spent in counseling and coordination of care     ________________________________________________________________________  Ting Chow MD     Procedures: see electronic medical records for all procedures/Xrays and details which were not copied into this note but were reviewed prior to creation of Plan. LABS:  I reviewed today's most current labs and imaging studies.   Pertinent labs include:  Recent Labs     11/02/20 0126 11/01/20  0433 10/31/20  2138   WBC 7.3 7.6 9.3   HGB 12.3 11.9 13.0   HCT 41.4 42.0 45.6   * 130* 139*     Recent Labs     11/02/20 0126 11/01/20  0433 10/31/20  2240    141 139   K 3.5 3.9 4.3   CL 86* 86* 92*   CO2 >45* >45* >45*   * 100 133*   BUN 18 18 20   CREA 0.62 0.46* 0.52*   CA 9.3 9.6 9.1   MG  --   --  2.2   ALB  --   --  2.7*   TBILI  --   --  0.7   ALT  --   --  27       Signed: Ting Chow MD

## 2020-11-02 NOTE — PROGRESS NOTES
[facility]  Pulmonary, Critical Care, and Sleep Medicine    Patient Consult    Name: Dionne Cerrato MRN: [id number]   : 1960 Hospital: [facility]   Date: 2020        IMPRESSION:   · Acute on chronic hypoxemic, hypercapnic respiratory failure - better after treatment with bipap   · Probable JOSLYN/OHV   · CHF exacerbation   · Afib   · History of tobacco abuse, possible COPD but unknown FEV1, no wheezing on exam   · Morbid obesity   · Noncompliance   · Debility       RECOMMENDATIONS:   · Bipap whenever she is asleep at night and during day as needed. · Agree with diuresis   · Afib per cards   · COPD seems near baseline. on duo-nebs for now. · We can see her in outpatient follow up via telemedicine since she can't come and see us in person. We can arrange to see her in pulm and sleep clinics after d/c. Subjective:     Pt seen this pm. Sleepy when seen. NO HPI or ROS is obtainable. No acute changes noted. ON NC oxygen when seen. This patient has been seen and evaluated at the request of Dr. Carmelita Fabry for hypercapnia. Patient is a 63 y.o. female with a history of chronic hypoxemic, hypercapnic respiratory failure, afib, CHF, DM2, and HTN who was admitted with AMS. She says she has had increased dyspnea and cough lately. She has no purulent sputum, fevers, chills, or sweats. She is supposed to be on Eliquis but stopped taking it because she could not afford it. She has bipap at home but doesn't use it because she says no one ever told her she had to use it. She is bedbound due to knee pain; her daughter takes care of her. She smoked for 40 years but doesn't carry the diagnosis of COPD. We have seen her here in the ICU but never in the office. Again, she is bedbound. She wears 3L O2 at baseline. On admission ABG was 7.19/>90/114. She was placed on bipap and has had good improvement in her ABG and mentation.       Past Medical History: Diagnosis Date    CHF (congestive heart failure) (HCC)     Diabetes ([facility] [address]     Hypertension     Ill-defined condition     high cholesterol    Ill-defined condition     tachycardia    Other and unspecified symptoms and signs involving general sensations and perceptions     ruptured disc      History reviewed. No pertinent surgical history. Prior to Admission medications    Medication Sig Start Date End Date Taking? Authorizing Provider   OXYGEN-AIR DELIVERY SYSTEMS 3 L/min by Nasal route continuous. 8/31/20  Yes Theresa Joaquin NP   furosemide (LASIX) 40 mg tablet Take 1 Tab by mouth daily. 8/7/20  Yes Miracle Hernández MD   atorvastatin (LIPITOR) 10 mg tablet Take 10 mg by mouth nightly. Yes Rachael, MD Russ   metoprolol (LOPRESSOR) 25 mg tablet Take 25 mg by mouth two (2) times a day. Yes Other, MD Russ   bismuth subsalicylate (PEPTO-BISMOL MAXIMUM STRENGTH) 525 mg/15 mL susp Take 30 mL by mouth as needed for Diarrhea. Provider, Historical   multivitamin, tx-iron-ca-min (THERA-M w/ IRON) 9 mg iron-400 mcg tab tablet Take 1 Tab by mouth daily. Provider, Historical   acetaminophen (TYLENOL) 325 mg tablet Take 650 mg by mouth every six (6) hours as needed for Pain. Provider, Historical   nitroglycerin (Nitrostat) 0.4 mg SL tablet 0.4 mg by SubLINGual route every five (5) minutes as needed for Chest Pain. Up to 3 doses. Provider, Historical   azithromycin (Zithromax Z-Andrés) 250 mg tablet Z-Andrés use as directed 8/14/20   Bryon Peters MD   apixaban (Eliquis) 5 mg tablet Take 1 Tab by mouth two (2) times a day. 8/7/20   Miracle Hernández MD     Allergies   Allergen Reactions    Cefazolin Rash    Other Plant, Animal, Environmental Rash     Ivory soap caused rash on hands. Social History     Tobacco Use    Smoking status: Former Smoker    Smokeless tobacco: Never Used   Substance Use Topics    Alcohol use: No      History reviewed. No pertinent family history.      Current Facility-Administered Medications   Medication Dose Route Frequency    enoxaparin (LOVENOX) injection 135 mg ++partial syringe++  135 mg SubCUTAneous Q12H    atorvastatin (LIPITOR) tablet 10 mg  10 mg Oral QHS    metoprolol tartrate (LOPRESSOR) tablet 25 mg  25 mg Oral BID    sodium chloride (NS) flush 5-40 mL  5-40 mL IntraVENous Q8H    albuterol-ipratropium (DUO-NEB) 2.5 MG-0.5 MG/3 ML  3 mL Nebulization Q4H RT    furosemide (LASIX) injection 40 mg  40 mg IntraVENous BID    dilTIAZem ER (CARDIZEM CD) capsule 120 mg  120 mg Oral DAILY    miconazole (SECURA) 2 % extra thick cream   Topical BID    balsam peru-castor oiL (VENELEX) ointment   Topical BID       Review of Systems:  Gen: +fatigue, -fevers/chills/sweats   HENT: -voice change   Eyes: -discharge   Resp: +dyspnea, +cough  CV: +CP, +dyspnea, +edema   GI: -abdominal pain/n/v/d  : -dysuria   MS: +joint pain   Skin: no changes   Neuro: +mental status changes   Psych: +AMS     Objective:   Vital Signs:    Visit Vitals  BP (!) 112/46   Pulse 84   Temp 98 °F (36.7 °C)   Resp 20   Ht 5' 3\" (1.6 m)   Wt 142 kg (313 lb 0.9 oz)   SpO2 93%   BMI 55.45 kg/m²       O2 Device: Nasal cannula   O2 Flow Rate (L/min): 3 l/min   Temp (24hrs), Av.4 °F (36.9 °C), Min:98 °F (36.7 °C), Max:98.9 °F (37.2 °C)       Intake/Output:   Last shift:      No intake/output data recorded. Last 3 shifts: 10/31 1901 -  0700  In: 360 [P.O.:360]  Out: 2650 [Urine:2650]    Intake/Output Summary (Last 24 hours) at 2020 1635  Last data filed at 2020 0601  Gross per 24 hour   Intake 120 ml   Output 1650 ml   Net -1530 ml      Physical Exam:   General:  Sleeping when seen. no distress, appears stated age. Obese. Head:  Normocephalic, without obvious abnormality, atraumatic. Eyes:  Conjunctivae/corneas clear. PERRL, EOMs intact. Nose: Nares normal. Septum midline. Mucosa normal. No drainage or sinus tenderness.    Throat: Lips, mucosa, and tongue normal. Teeth and gums normal.   Neck: Supple, symmetrical, trachea midline, no adenopathy, thyroid: no enlargment/tenderness/nodules, no carotid bruit and no JVD. Back:   Symmetric, no curvature. ROM normal.   Lungs:   Clear to auscultation bilaterally. Anterior exam only as patient cannot sit up. Chest wall:  No tenderness or deformity. Heart:  Irregular rate and rhythm     Abdomen:   Soft, non-tender. Bowel sounds normal. No masses,  No organomegaly. Obese. Extremities: Extremities atraumatic, no cyanosis. +Edema. Pulses: 2+ and symmetric all extremities. Skin: Skin color, texture, turgor normal. No rashes or lesions   Lymph nodes: Cervical, supraclavicular, and axillary nodes normal.   Neurologic: Grossly nonfocal, motor is intact. Psych: no overt anxiety or depression when seen.       Data review:     Recent Results (from the past 24 hour(s))   METABOLIC PANEL, BASIC    Collection Time: 11/02/20  1:26 AM   Result Value Ref Range    Sodium 136 136 - 145 mmol/L    Potassium 3.5 3.5 - 5.1 mmol/L    Chloride 86 (L) 97 - 108 mmol/L    CO2 >45 (HH) 21 - 32 mmol/L    Anion gap Cannot be calculated 5 - 15 mmol/L    Glucose 102 (H) 65 - 100 mg/dL    BUN 18 6 - 20 MG/DL    Creatinine 0.62 0.55 - 1.02 MG/DL    BUN/Creatinine ratio 29 (H) 12 - 20      GFR est AA >60 >60 ml/min/1.73m2    GFR est non-AA >60 >60 ml/min/1.73m2    Calcium 9.3 8.5 - 10.1 MG/DL   CBC WITH AUTOMATED DIFF    Collection Time: 11/02/20  1:26 AM   Result Value Ref Range    WBC 7.3 3.6 - 11.0 K/uL    RBC 3.91 3.80 - 5.20 M/uL    HGB 12.3 11.5 - 16.0 g/dL    HCT 41.4 35.0 - 47.0 %    .9 (H) 80.0 - 99.0 FL    MCH 31.5 26.0 - 34.0 PG    MCHC 29.7 (L) 30.0 - 36.5 g/dL    RDW 14.3 11.5 - 14.5 %    PLATELET 481 (L) 220 - 400 K/uL    MPV 12.1 8.9 - 12.9 FL    NRBC 0.0 0  WBC    ABSOLUTE NRBC 0.00 0.00 - 0.01 K/uL    NEUTROPHILS 67 32 - 75 %    LYMPHOCYTES 24 12 - 49 %    MONOCYTES 6 5 - 13 %    EOSINOPHILS 3 0 - 7 %    BASOPHILS 0 0 - 1 % IMMATURE GRANULOCYTES 0 0.0 - 0.5 %    ABS. NEUTROPHILS 4.9 1.8 - 8.0 K/UL    ABS. LYMPHOCYTES 1.7 0.8 - 3.5 K/UL    ABS. MONOCYTES 0.4 0.0 - 1.0 K/UL    ABS. EOSINOPHILS 0.2 0.0 - 0.4 K/UL    ABS. BASOPHILS 0.0 0.0 - 0.1 K/UL    ABS. IMM.  GRANS. 0.0 0.00 - 0.04 K/UL    DF AUTOMATED     NUCLEAR CARDIAC STRESS TEST    Collection Time: 11/02/20 11:42 AM   Result Value Ref Range    Target  bpm    Exercise duration time 00:01:57     Stress Base Systolic  mmHg    Stress Base Diastolic BP 53 mmHg    Post peak  BPM    Baseline HR 80 BPM    Estimated workload 1.0 METS    Baseline  mmHg    O2 sat rest 97 %    Percent HR 67 %    O2 sat peak 100 %    Stress Base Diastolic BP 53 mmHg    Stress Rate Pressure Product 14,628 BPM*mmHg       Imaging:  I have personally reviewed the patients radiographs and have reviewed the reports:  Central pulmonary vascular congestion         Char Live MD

## 2020-11-02 NOTE — PROGRESS NOTES
0700: [facility] END OF SHIFT REPORT      Bedside shift change report GIVEN TO Xavier Steiner RN. Report included the following information SBAR, Kardex, Intake/Output, MAR, Recent Results and Cardiac Rhythm NSR.       SIGNIFICANT CHANGES DURING SHIFT:        CONCERNS TO ADDRESS WITH MD:          [address] NURSING NOTE   Admission Date 10/31/2020   Admission Diagnosis Acute respiratory failure with hypercapnia ([address] [J96.02]   Consults IP CONSULT TO HOSPITALIST  IP CONSULT TO CARDIOLOGY  IP CONSULT TO PULMONOLOGY      Cardiac Monitoring [x] Yes [] No      Purposeful Hourly Rounding [x] Yes    Anna Score Total Score: 2   Anna score 3 or > [] Bed Alarm [] Avasys [] 1:1 sitter [] Patient refused (Signed refusal form in chart)   Gaston Score Gaston Score: 12   Gaston score 14 or < [] PMT consult [x] Wound Care consult    [x]  Specialty bed  [] Nutrition consult      Influenza Vaccine             Oxygen needs? [] Room air Oxygen @  []1L    []2L    []3L   []4L    []5L   []6L via  NC   Chronic home O2 use?  [] Yes [x] No  Perform O2 challenge test and document in progress note using [email or web address] (.Homeoxygen)      Last bowel movement Last Bowel Movement Date: 10/31/20      Urinary Catheter       External Female Catheter 11/01/20-Urine Output (mL): 300 ml     LDAs               Peripheral IV 11/01/20 Anterior;Proximal;Right Forearm (Active)   Site Assessment Clean, dry, & intact 11/02/20 0414   Phlebitis Assessment 0 11/02/20 0414   Infiltration Assessment 0 11/02/20 0414   Dressing Status Clean, dry, & intact 11/02/20 0414   Dressing Type Tape;Transparent 11/02/20 0414   Hub Color/Line Status Blue 11/02/20 0414          External Female Catheter 11/01/20 (Active)   Site Assessment Clean, dry, & intact 11/01/20 1300   Repositioned Yes 11/01/20 1300   Perineal Care Yes 11/01/20 1300   Wick Changed Yes 11/01/20 1300   Suction Canister/Tubing Changed No 11/01/20 1300   Urine Output (mL) 300 ml 11/02/20 0601                   Readmission Risk Assessment Tool Score Low Risk            9       Total Score        4 IP Visits Last 12 Months (1-3=4, 4=9, >4=11)    5 Pt. Coverage (Medicare=5 , Medicaid, or Self-Pay=4)        Criteria that do not apply:    Has Seen PCP in Last 6 Months (Yes=3, No=0)    . Living with Significant Other. Assisted Living. LTAC. SNF.  or   Rehab    Patient Length of Stay (>5 days = 3)    Charlson Comorbidity Score (Age + Comorbid Conditions)       Expected Length of Stay - - -   Actual Length of Stay 1

## 2020-11-02 NOTE — PROGRESS NOTES
0730 Bedside shift change report given to [address] (oncoming nurse) by Domingo Koehler RN (offgoing nurse).  Report included the following information SBAR, Kardex, ED Summary, Intake/Output, MAR, Recent Results and Cardiac Rhythm NSR.     1220 Pt returned to floor from first part of stress test.    1355  Pt back on floor form stress test.

## 2020-11-02 NOTE — PROGRESS NOTES
Problem: Falls - Risk of  Goal: *Absence of Falls  Description: Document Beny Mendoza Fall Risk and appropriate interventions in the flowsheet.   Outcome: Progressing Towards Goal  Note: Fall Risk Interventions:       Mentation Interventions: Adequate sleep, hydration, pain control, More frequent rounding    Medication Interventions: Bed/chair exit alarm, Evaluate medications/consider consulting pharmacy    Elimination Interventions: Call light in reach, Patient to call for help with toileting needs, Toileting schedule/hourly rounds

## 2020-11-03 ENCOUNTER — HOSPITAL ENCOUNTER (OUTPATIENT)
Dept: NUCLEAR MEDICINE | Age: 60
Discharge: HOME | DRG: 189 | End: 2020-11-03
Attending: INTERNAL MEDICINE
Payer: MEDICARE

## 2020-11-03 LAB
GLUCOSE BLD STRIP.AUTO-MCNC: 125 MG/DL (ref 65–100)
SERVICE CMNT-IMP: ABNORMAL
STRESS BASELINE DIAS BP: 53 MMHG
STRESS BASELINE HR: 80 BPM
STRESS BASELINE SYS BP: 138 MMHG
STRESS ESTIMATED WORKLOAD: 1 METS
STRESS EXERCISE DUR MIN: NORMAL
STRESS O2 SAT PEAK: 100 %
STRESS O2 SAT REST: 97 %
STRESS PEAK DIAS BP: 53 MMHG
STRESS PEAK SYS BP: 138 MMHG
STRESS PERCENT HR ACHIEVED: 67 %
STRESS POST PEAK HR: 106 BPM
STRESS RATE PRESSURE PRODUCT: NORMAL BPM*MMHG
STRESS TARGET HR: 159 BPM

## 2020-11-03 PROCEDURE — 93458 L HRT ARTERY/VENTRICLE ANGIO: CPT | Performed by: INTERNAL MEDICINE

## 2020-11-03 PROCEDURE — 94640 AIRWAY INHALATION TREATMENT: CPT

## 2020-11-03 PROCEDURE — 74011000250 HC RX REV CODE- 250: Performed by: INTERNAL MEDICINE

## 2020-11-03 PROCEDURE — 77030008543 HC TBNG MON PRSS MRTM -A: Performed by: INTERNAL MEDICINE

## 2020-11-03 PROCEDURE — 77030019569 HC BND COMPR RAD TERU -B: Performed by: INTERNAL MEDICINE

## 2020-11-03 PROCEDURE — 74011250637 HC RX REV CODE- 250/637: Performed by: NURSE PRACTITIONER

## 2020-11-03 PROCEDURE — 74011000636 HC RX REV CODE- 636: Performed by: INTERNAL MEDICINE

## 2020-11-03 PROCEDURE — 82962 GLUCOSE BLOOD TEST: CPT

## 2020-11-03 PROCEDURE — 65660000000 HC RM CCU STEPDOWN

## 2020-11-03 PROCEDURE — 74011250636 HC RX REV CODE- 250/636: Performed by: INTERNAL MEDICINE

## 2020-11-03 PROCEDURE — 77030038269 HC DRN EXT URIN PURWCK BARD -A

## 2020-11-03 PROCEDURE — 2709999900 HC NON-CHARGEABLE SUPPLY

## 2020-11-03 PROCEDURE — 77030004549 HC CATH ANGI DX PRF MRTM -A: Performed by: INTERNAL MEDICINE

## 2020-11-03 PROCEDURE — 74011250637 HC RX REV CODE- 250/637: Performed by: INTERNAL MEDICINE

## 2020-11-03 PROCEDURE — 74011250637 HC RX REV CODE- 250/637: Performed by: STUDENT IN AN ORGANIZED HEALTH CARE EDUCATION/TRAINING PROGRAM

## 2020-11-03 PROCEDURE — C1894 INTRO/SHEATH, NON-LASER: HCPCS | Performed by: INTERNAL MEDICINE

## 2020-11-03 PROCEDURE — B2111ZZ FLUOROSCOPY OF MULTIPLE CORONARY ARTERIES USING LOW OSMOLAR CONTRAST: ICD-10-PCS | Performed by: INTERNAL MEDICINE

## 2020-11-03 PROCEDURE — 99152 MOD SED SAME PHYS/QHP 5/>YRS: CPT | Performed by: INTERNAL MEDICINE

## 2020-11-03 PROCEDURE — 74011000250 HC RX REV CODE- 250: Performed by: STUDENT IN AN ORGANIZED HEALTH CARE EDUCATION/TRAINING PROGRAM

## 2020-11-03 PROCEDURE — 4A023N7 MEASUREMENT OF CARDIAC SAMPLING AND PRESSURE, LEFT HEART, PERCUTANEOUS APPROACH: ICD-10-PCS | Performed by: INTERNAL MEDICINE

## 2020-11-03 PROCEDURE — 99232 SBSQ HOSP IP/OBS MODERATE 35: CPT | Performed by: INTERNAL MEDICINE

## 2020-11-03 PROCEDURE — B2151ZZ FLUOROSCOPY OF LEFT HEART USING LOW OSMOLAR CONTRAST: ICD-10-PCS | Performed by: INTERNAL MEDICINE

## 2020-11-03 PROCEDURE — C1769 GUIDE WIRE: HCPCS | Performed by: INTERNAL MEDICINE

## 2020-11-03 PROCEDURE — 77030015766: Performed by: INTERNAL MEDICINE

## 2020-11-03 PROCEDURE — 77010033678 HC OXYGEN DAILY

## 2020-11-03 PROCEDURE — 94660 CPAP INITIATION&MGMT: CPT

## 2020-11-03 PROCEDURE — 77030010221 HC SPLNT WR POS TELE -B: Performed by: INTERNAL MEDICINE

## 2020-11-03 PROCEDURE — 99153 MOD SED SAME PHYS/QHP EA: CPT | Performed by: INTERNAL MEDICINE

## 2020-11-03 RX ORDER — VERAPAMIL HYDROCHLORIDE 2.5 MG/ML
INJECTION, SOLUTION INTRAVENOUS AS NEEDED
Status: DISCONTINUED | OUTPATIENT
Start: 2020-11-03 | End: 2020-11-03 | Stop reason: HOSPADM

## 2020-11-03 RX ORDER — HEPARIN SODIUM 1000 [USP'U]/ML
INJECTION, SOLUTION INTRAVENOUS; SUBCUTANEOUS AS NEEDED
Status: DISCONTINUED | OUTPATIENT
Start: 2020-11-03 | End: 2020-11-03 | Stop reason: HOSPADM

## 2020-11-03 RX ORDER — SODIUM CHLORIDE 0.9 % (FLUSH) 0.9 %
5-40 SYRINGE (ML) INJECTION AS NEEDED
Status: DISCONTINUED | OUTPATIENT
Start: 2020-11-03 | End: 2020-11-04 | Stop reason: HOSPADM

## 2020-11-03 RX ORDER — NALOXONE HYDROCHLORIDE 0.4 MG/ML
0.4 INJECTION, SOLUTION INTRAMUSCULAR; INTRAVENOUS; SUBCUTANEOUS AS NEEDED
Status: DISCONTINUED | OUTPATIENT
Start: 2020-11-03 | End: 2020-11-04 | Stop reason: HOSPADM

## 2020-11-03 RX ORDER — FUROSEMIDE 40 MG/1
40 TABLET ORAL DAILY
Status: DISCONTINUED | OUTPATIENT
Start: 2020-11-03 | End: 2020-11-04 | Stop reason: HOSPADM

## 2020-11-03 RX ORDER — ACETAMINOPHEN 325 MG/1
650 TABLET ORAL
Status: DISCONTINUED | OUTPATIENT
Start: 2020-11-03 | End: 2020-11-04 | Stop reason: HOSPADM

## 2020-11-03 RX ORDER — FUROSEMIDE 40 MG/1
40 TABLET ORAL DAILY
Status: DISCONTINUED | OUTPATIENT
Start: 2020-11-04 | End: 2020-11-03

## 2020-11-03 RX ORDER — LIDOCAINE HYDROCHLORIDE 10 MG/ML
INJECTION, SOLUTION EPIDURAL; INFILTRATION; INTRACAUDAL; PERINEURAL AS NEEDED
Status: DISCONTINUED | OUTPATIENT
Start: 2020-11-03 | End: 2020-11-03 | Stop reason: HOSPADM

## 2020-11-03 RX ORDER — BUDESONIDE 0.5 MG/2ML
500 INHALANT ORAL
Status: DISCONTINUED | OUTPATIENT
Start: 2020-11-03 | End: 2020-11-04 | Stop reason: HOSPADM

## 2020-11-03 RX ORDER — GUAIFENESIN 100 MG/5ML
81 LIQUID (ML) ORAL DAILY
Status: DISCONTINUED | OUTPATIENT
Start: 2020-11-04 | End: 2020-11-04 | Stop reason: HOSPADM

## 2020-11-03 RX ORDER — MIDAZOLAM HYDROCHLORIDE 1 MG/ML
INJECTION, SOLUTION INTRAMUSCULAR; INTRAVENOUS AS NEEDED
Status: DISCONTINUED | OUTPATIENT
Start: 2020-11-03 | End: 2020-11-03 | Stop reason: HOSPADM

## 2020-11-03 RX ORDER — HEPARIN SODIUM 200 [USP'U]/100ML
INJECTION, SOLUTION INTRAVENOUS
Status: COMPLETED | OUTPATIENT
Start: 2020-11-03 | End: 2020-11-03

## 2020-11-03 RX ORDER — GUAIFENESIN 100 MG/5ML
81 LIQUID (ML) ORAL DAILY
Status: DISCONTINUED | OUTPATIENT
Start: 2020-11-03 | End: 2020-11-03

## 2020-11-03 RX ORDER — FENTANYL CITRATE 50 UG/ML
INJECTION, SOLUTION INTRAMUSCULAR; INTRAVENOUS AS NEEDED
Status: DISCONTINUED | OUTPATIENT
Start: 2020-11-03 | End: 2020-11-03 | Stop reason: HOSPADM

## 2020-11-03 RX ORDER — SODIUM CHLORIDE 9 MG/ML
100 INJECTION, SOLUTION INTRAVENOUS CONTINUOUS
Status: DISCONTINUED | OUTPATIENT
Start: 2020-11-03 | End: 2020-11-04 | Stop reason: HOSPADM

## 2020-11-03 RX ORDER — MONTELUKAST SODIUM 10 MG/1
10 TABLET ORAL
Status: DISCONTINUED | OUTPATIENT
Start: 2020-11-03 | End: 2020-11-04 | Stop reason: HOSPADM

## 2020-11-03 RX ORDER — SODIUM CHLORIDE 0.9 % (FLUSH) 0.9 %
5-40 SYRINGE (ML) INJECTION EVERY 8 HOURS
Status: DISCONTINUED | OUTPATIENT
Start: 2020-11-03 | End: 2020-11-04 | Stop reason: HOSPADM

## 2020-11-03 RX ADMIN — METOPROLOL TARTRATE 25 MG: 25 TABLET, FILM COATED ORAL at 17:56

## 2020-11-03 RX ADMIN — IPRATROPIUM BROMIDE AND ALBUTEROL SULFATE 3 ML: .5; 3 SOLUTION RESPIRATORY (INHALATION) at 07:33

## 2020-11-03 RX ADMIN — Medication 10 ML: at 04:45

## 2020-11-03 RX ADMIN — FUROSEMIDE 40 MG: 40 TABLET ORAL at 10:28

## 2020-11-03 RX ADMIN — ATORVASTATIN CALCIUM 10 MG: 10 TABLET, FILM COATED ORAL at 21:42

## 2020-11-03 RX ADMIN — MICONAZOLE NITRATE: 20 CREAM TOPICAL at 10:35

## 2020-11-03 RX ADMIN — Medication 10 ML: at 18:01

## 2020-11-03 RX ADMIN — MONTELUKAST 10 MG: 10 TABLET, FILM COATED ORAL at 21:42

## 2020-11-03 RX ADMIN — IPRATROPIUM BROMIDE AND ALBUTEROL SULFATE 3 ML: .5; 3 SOLUTION RESPIRATORY (INHALATION) at 03:21

## 2020-11-03 RX ADMIN — CASTOR OIL AND BALSAM, PERU: 788; 87 OINTMENT TOPICAL at 18:00

## 2020-11-03 RX ADMIN — BUDESONIDE 500 MCG: 0.5 INHALANT RESPIRATORY (INHALATION) at 08:44

## 2020-11-03 RX ADMIN — METOPROLOL TARTRATE 25 MG: 25 TABLET, FILM COATED ORAL at 10:30

## 2020-11-03 RX ADMIN — ENOXAPARIN SODIUM 135 MG: 150 INJECTION SUBCUTANEOUS at 03:02

## 2020-11-03 RX ADMIN — SODIUM CHLORIDE 100 ML/HR: 900 INJECTION, SOLUTION INTRAVENOUS at 18:00

## 2020-11-03 RX ADMIN — ACETAMINOPHEN 650 MG: 325 TABLET ORAL at 23:13

## 2020-11-03 RX ADMIN — DILTIAZEM HYDROCHLORIDE 120 MG: 120 CAPSULE, COATED, EXTENDED RELEASE ORAL at 10:34

## 2020-11-03 RX ADMIN — Medication 10 ML: at 21:43

## 2020-11-03 RX ADMIN — IPRATROPIUM BROMIDE AND ALBUTEROL SULFATE 3 ML: .5; 3 SOLUTION RESPIRATORY (INHALATION) at 19:52

## 2020-11-03 RX ADMIN — CASTOR OIL AND BALSAM, PERU: 788; 87 OINTMENT TOPICAL at 10:36

## 2020-11-03 NOTE — PROGRESS NOTES
Hospitalist Progress Note    NAME: Jose Maria Barnes   :  1960   MRN:  [id number]   Room Number:  0331/87  @ [facility]       Interim Hospital Summary: 63 y.o. female whom presented on 10/31/2020 with      Assessment / Plan:  Anticipated discharge date : 11/3  Anticipated disposition : Home with [facility]   Barriers to discharge : Cath      Acute metabolic encephalopathy, POA, resolved  Acute on chronic hypoxic and hypercapnic RF, POA, improving  Acute COPD exacerbation POA, improving  CXR with no acute process. Stable cardiac silhouette enlargement with central pulmonary vascular congestion. probnp 1K  Encephalopathy resolved with treatment of acute hypercapnia. PCO2 on admission 88.5. She was weaned off bipap. She was empiric IV abx, prednisone but was discontinued by pulm    - continue nebs  - Switch to oral furosemide  - Continue supplemental oxygen     Atrial fib with RVR  HTN  RRT called for CP   Pt started on amiodarone gtt d/t borderline low bp. She converted to NSR. Cardiology evaluated, transitioned to PO cardizem and cont' PTA BB  Echo in 2020 showed EF 50-55%. Mild concentric hypertrophy      -Cont'  lovenox for now. Per CM, NOAC and enoxaparin will be cost prohibitive due to out of pocket expense, warfarin not ideal due to concern with compliance, frequent lab monitoring. Discussed with Dr. Yoandy Guerin. Although, AC would be ideal, it is  acceptable to continue with aspirin 81 mg daily given above mentioned issues with AC.   -Stress test  showing questionable inferior ischemia. Cardiac cath today.        Hyperlipidemia  Cont' statin     T2DM  Currently not on meds. SSI for now     Morbid obesity / Body mass index is 55.45 kg/m².     Code status: Full  Prophylaxis: Lovenox  Recommended Disposition: TBD.  bedbound at baseline. Lives with daughter     Subjective:     Chief Complaint / Reason for Physician Visit  \"I feel alright\".   Discussed with RN events overnight. Review of Systems:  No fevers, chills, appetite change, cough, sputum production, shortness of breath, dyspnea on exertion, nausea, vomitting, diarrhea, constipation, chest pain, leg edema, abdominal pain, joint pain, rash, itching. Tolerating PT/OT. Tolerating diet. Objective:     VITALS:   Last 24hrs VS reviewed since prior progress note. Most recent are:  Patient Vitals for the past 24 hrs:   Temp Pulse Resp BP SpO2   11/03/20 1224 98.2 °F (36.8 °C) 72 20 (!) 118/59 96 %   11/03/20 1030    (!) 116/47    11/03/20 0851     97 %   11/03/20 0733     98 %   11/03/20 0633     95 %   11/03/20 0632 98.3 °F (36.8 °C) 75 20 (!) 119/51 (!) 82 %   11/03/20 0321     96 %   11/03/20 0241 98.4 °F (36.9 °C) 68 20 (!) 114/47 97 %   11/02/20 2305     97 %   11/02/20 2241 99 °F (37.2 °C) 76 20 (!) 100/41    11/02/20 2222     95 %   11/02/20 1928 98.9 °F (37.2 °C) 82 20 (!) 109/53 93 %   11/02/20 1918     93 %   11/02/20 1745  82 20 109/64 93 %   11/02/20 1515  84  (!) 112/46    11/02/20 1352  84 20 (!) 110/53 93 %       Intake/Output Summary (Last 24 hours) at 11/3/2020 1327  Last data filed at 11/3/2020 5288  Gross per 24 hour   Intake 120 ml   Output 1850 ml   Net -1730 ml        PHYSICAL EXAM:  General:  Alert, cooperative, no acute distress    EENT:  EOMI. Anicteric sclerae. MMM  Resp:  CTA bilaterally, no wheezing or rales. No accessory muscle use  CV:  Regular  rhythm,  normal S1/S2, no murmurs rubs gallops, No edema  GI:  Soft, Non distended, Non tender. +Bowel sounds  Neurologic:  Alert and oriented X 3, normal speech,   Psych:   Good insight. Not anxious nor agitated  Skin:  No rashes.   No jaundice    Reviewed most current lab test results and cultures  YES  Reviewed most current radiology test results   YES  Review and summation of old records today    NO  Reviewed patient's current orders and MAR    YES  PMH/SH reviewed - no change compared to H&P  ________________________________________________________________________  Care Plan discussed with:    Comments   Patient x    Family      RN x    Care Manager x    Consultant  x                     x Multidiciplinary team rounds were held today with , nursing, pharmacist and clinical coordinator. Patient's plan of care was discussed; medications were reviewed and discharge planning was addressed. ________________________________________________________________________  Total NON critical care TIME:  35  Minutes    Total CRITICAL CARE TIME Spent:   Minutes non procedure based      Comments   >50% of visit spent in counseling and coordination of care x    ________________________________________________________________________  Cristiano You MD     Procedures: see electronic medical records for all procedures/Xrays and details which were not copied into this note but were reviewed prior to creation of Plan. LABS:  I reviewed today's most current labs and imaging studies.   Pertinent labs include:  Recent Labs     11/02/20  0126 11/01/20  0433 10/31/20  2138   WBC 7.3 7.6 9.3   HGB 12.3 11.9 13.0   HCT 41.4 42.0 45.6   * 130* 139*     Recent Labs     11/02/20  0126 11/01/20  0433 10/31/20  2240    141 139   K 3.5 3.9 4.3   CL 86* 86* 92*   CO2 >45* >45* >45*   * 100 133*   BUN 18 18 20   CREA 0.62 0.46* 0.52*   CA 9.3 9.6 9.1   MG  --   --  2.2   ALB  --   --  2.7*   TBILI  --   --  0.7   ALT  --   --  27       Signed: Cristiano You MD

## 2020-11-03 NOTE — PROGRESS NOTES
Stress test revealed possible, small inferior wall ischemia. Discussed with patient observation v/s cath. She notes chest tightness, pressure in chest at rest for the last month. Wants to proceed with cath/PCI. Discussed with staff, Dr. Lupe Huggins.

## 2020-11-03 NOTE — PROGRESS NOTES
0700: Bedside shift change report given to Buddy RN (oncoming nurse) by Tiff Manrique RN (offgoing nurse). Report included the following information SBAR and Kardex.

## 2020-11-03 NOTE — PROGRESS NOTES
Problem: Falls - Risk of  Goal: *Absence of Falls  Description: Document Parth Le Fall Risk and appropriate interventions in the flowsheet.   Outcome: Progressing Towards Goal  Note: Fall Risk Interventions:       Mentation Interventions: Adequate sleep, hydration, pain control, More frequent rounding    Medication Interventions: Evaluate medications/consider consulting pharmacy    Elimination Interventions: Call light in reach, Patient to call for help with toileting needs, Toileting schedule/hourly rounds

## 2020-11-03 NOTE — PROGRESS NOTES
Update called to Victoriano Reardon RN. Information given to include vital signs, site assessment, pain, and orientation. Opportunity given for questions and clarification. Pt transported to [place] [id number].

## 2020-11-03 NOTE — PROGRESS NOTES
Cardiology Progress Note      11/2/2020 9:10 PM    Admit Date: 10/31/2020    Admit Diagnosis: Acute respiratory failure with hypercapnia (HCC) [J96.02]      Subjective: Lincoln Crawford is maintaining sinus rhythm. Breathing better.     Visit Vitals  BP (!) 109/53 (BP 1 Location: Left arm, BP Patient Position: At rest)   Pulse 82   Temp 98.9 °F (37.2 °C)   Resp 20   Ht 5' 3\" (1.6 m)   Wt 313 lb 0.9 oz (142 kg)   SpO2 93%   BMI 55.45 kg/m²       Current Facility-Administered Medications   Medication Dose Route Frequency    enoxaparin (LOVENOX) injection 135 mg ++partial syringe++  135 mg SubCUTAneous Q12H    sodium chloride (NS) flush 10 mL  10 mL IntraVENous PRN    atorvastatin (LIPITOR) tablet 10 mg  10 mg Oral QHS    metoprolol tartrate (LOPRESSOR) tablet 25 mg  25 mg Oral BID    nitroglycerin (NITROSTAT) tablet 0.4 mg  0.4 mg SubLINGual Q5MIN PRN    sodium chloride (NS) flush 5-40 mL  5-40 mL IntraVENous Q8H    sodium chloride (NS) flush 5-40 mL  5-40 mL IntraVENous PRN    acetaminophen (TYLENOL) tablet 650 mg  650 mg Oral Q6H PRN    Or    acetaminophen (TYLENOL) suppository 650 mg  650 mg Rectal Q6H PRN    polyethylene glycol (MIRALAX) packet 17 g  17 g Oral DAILY PRN    promethazine (PHENERGAN) tablet 12.5 mg  12.5 mg Oral Q6H PRN    Or    ondansetron (ZOFRAN) injection 4 mg  4 mg IntraVENous Q6H PRN    albuterol-ipratropium (DUO-NEB) 2.5 MG-0.5 MG/3 ML  3 mL Nebulization Q4H RT    furosemide (LASIX) injection 40 mg  40 mg IntraVENous BID    dilTIAZem ER (CARDIZEM CD) capsule 120 mg  120 mg Oral DAILY    miconazole (SECURA) 2 % extra thick cream   Topical BID    balsam peru-castor oiL (VENELEX) ointment   Topical BID         Objective:      Physical Exam:  Visit Vitals  BP (!) 109/53 (BP 1 Location: Left arm, BP Patient Position: At rest)   Pulse 82   Temp 98.9 °F (37.2 °C)   Resp 20   Ht 5' 3\" (1.6 m)   Wt 313 lb 0.9 oz (142 kg)   SpO2 93%   BMI 55.45 kg/m²     General Appearance: Well developed, well nourished,alert and oriented x 3, and individual in no acute distress. Ears/Nose/Mouth/Throat:   Hearing grossly normal.         Neck: Supple. Chest:   Lungs clear to auscultation bilaterally. Cardiovascular:  Regular rate and rhythm, S1, S2 normal, no murmur. Abdomen:   Soft, non-tender, bowel sounds are active. Extremities: No edema bilaterally. Skin: Warm and dry. Data Review:   Labs:    Recent Results (from the past 24 hour(s))   METABOLIC PANEL, BASIC    Collection Time: 11/02/20  1:26 AM   Result Value Ref Range    Sodium 136 136 - 145 mmol/L    Potassium 3.5 3.5 - 5.1 mmol/L    Chloride 86 (L) 97 - 108 mmol/L    CO2 >45 (HH) 21 - 32 mmol/L    Anion gap Cannot be calculated 5 - 15 mmol/L    Glucose 102 (H) 65 - 100 mg/dL    BUN 18 6 - 20 MG/DL    Creatinine 0.62 0.55 - 1.02 MG/DL    BUN/Creatinine ratio 29 (H) 12 - 20      GFR est AA >60 >60 ml/min/1.73m2    GFR est non-AA >60 >60 ml/min/1.73m2    Calcium 9.3 8.5 - 10.1 MG/DL   CBC WITH AUTOMATED DIFF    Collection Time: 11/02/20  1:26 AM   Result Value Ref Range    WBC 7.3 3.6 - 11.0 K/uL    RBC 3.91 3.80 - 5.20 M/uL    HGB 12.3 11.5 - 16.0 g/dL    HCT 41.4 35.0 - 47.0 %    .9 (H) 80.0 - 99.0 FL    MCH 31.5 26.0 - 34.0 PG    MCHC 29.7 (L) 30.0 - 36.5 g/dL    RDW 14.3 11.5 - 14.5 %    PLATELET 645 (L) 640 - 400 K/uL    MPV 12.1 8.9 - 12.9 FL    NRBC 0.0 0  WBC    ABSOLUTE NRBC 0.00 0.00 - 0.01 K/uL    NEUTROPHILS 67 32 - 75 %    LYMPHOCYTES 24 12 - 49 %    MONOCYTES 6 5 - 13 %    EOSINOPHILS 3 0 - 7 %    BASOPHILS 0 0 - 1 %    IMMATURE GRANULOCYTES 0 0.0 - 0.5 %    ABS. NEUTROPHILS 4.9 1.8 - 8.0 K/UL    ABS. LYMPHOCYTES 1.7 0.8 - 3.5 K/UL    ABS. MONOCYTES 0.4 0.0 - 1.0 K/UL    ABS. EOSINOPHILS 0.2 0.0 - 0.4 K/UL    ABS. BASOPHILS 0.0 0.0 - 0.1 K/UL    ABS. IMM.  GRANS. 0.0 0.00 - 0.04 K/UL    DF AUTOMATED     NUCLEAR CARDIAC STRESS TEST    Collection Time: 11/02/20 11:42 AM   Result Value Ref Range Target  bpm    Exercise duration time 00:01:57     Stress Base Systolic  mmHg    Stress Base Diastolic BP 53 mmHg    Post peak  BPM    Baseline HR 80 BPM    Estimated workload 1.0 METS    Baseline  mmHg    O2 sat rest 97 %    Percent HR 67 %    O2 sat peak 100 %    Stress Base Diastolic BP 53 mmHg    Stress Rate Pressure Product 14,628 BPM*mmHg       Telemetry: normal sinus rhythm      Assessment:     Hospital Problems  Never Reviewed          Codes Class Noted POA    [facility] ICD-10-CM: J96.02  ICD-9-CM: 518.81  11/1/2020 Unknown              Plan:     Continue oral cardizem, beta blocker. She has discontinued DOAC in the past due to cost.    Stress test today.     Destiny Cummins MD

## 2020-11-03 NOTE — PROGRESS NOTES
Cardiology Progress Note      11/3/2020 9:25 AM    Admit Date: 10/31/2020    Admit Diagnosis: Acute respiratory failure with hypercapnia (HCC) [J96.02]      Subjective: Yareli Jones is maintaining sinus rhythm. Breathing better. Stress test pending, to be completed today.     Visit Vitals  BP (!) 119/51   Pulse 75   Temp 98.3 °F (36.8 °C)   Resp 20   Ht 5' 3\" (1.6 m)   Wt 313 lb (142 kg) Comment: Patient bed was not zeroed when switched to specialty bed   SpO2 97%   BMI 55.45 kg/m²       Current Facility-Administered Medications   Medication Dose Route Frequency    montelukast (SINGULAIR) tablet 10 mg  10 mg Oral QHS    budesonide (PULMICORT) 500 mcg/2 ml nebulizer suspension  500 mcg Nebulization BID RT    enoxaparin (LOVENOX) injection 135 mg ++partial syringe++  135 mg SubCUTAneous Q12H    sodium chloride (NS) flush 10 mL  10 mL IntraVENous PRN    atorvastatin (LIPITOR) tablet 10 mg  10 mg Oral QHS    metoprolol tartrate (LOPRESSOR) tablet 25 mg  25 mg Oral BID    nitroglycerin (NITROSTAT) tablet 0.4 mg  0.4 mg SubLINGual Q5MIN PRN    sodium chloride (NS) flush 5-40 mL  5-40 mL IntraVENous Q8H    sodium chloride (NS) flush 5-40 mL  5-40 mL IntraVENous PRN    acetaminophen (TYLENOL) tablet 650 mg  650 mg Oral Q6H PRN    Or    acetaminophen (TYLENOL) suppository 650 mg  650 mg Rectal Q6H PRN    polyethylene glycol (MIRALAX) packet 17 g  17 g Oral DAILY PRN    promethazine (PHENERGAN) tablet 12.5 mg  12.5 mg Oral Q6H PRN    Or    ondansetron (ZOFRAN) injection 4 mg  4 mg IntraVENous Q6H PRN    albuterol-ipratropium (DUO-NEB) 2.5 MG-0.5 MG/3 ML  3 mL Nebulization Q4H RT    furosemide (LASIX) injection 40 mg  40 mg IntraVENous BID    dilTIAZem ER (CARDIZEM CD) capsule 120 mg  120 mg Oral DAILY    miconazole (SECURA) 2 % extra thick cream   Topical BID    balsam peru-castor oiL (VENELEX) ointment   Topical BID         Objective:      Physical Exam:  Visit Vitals  BP (!) 119/51   Pulse 75   Temp 98.3 °F (36.8 °C)   Resp 20   Ht 5' 3\" (1.6 m)   Wt 313 lb (142 kg) Comment: Patient bed was not zeroed when switched to specialty bed   SpO2 97%   BMI 55.45 kg/m²     General Appearance:  Well developed, well nourished,alert and oriented x 3, and individual in no acute distress. Ears/Nose/Mouth/Throat:   Hearing grossly normal.         Neck: Supple. Chest:   Lungs clear to auscultation bilaterally. Cardiovascular:  Regular rate and rhythm, S1, S2 normal, no murmur. Abdomen:   Soft, non-tender, bowel sounds are active. Extremities: No edema bilaterally. Skin: Warm and dry. Data Review:   Labs:    Recent Results (from the past 24 hour(s))   NUCLEAR CARDIAC STRESS TEST    Collection Time: 11/02/20 11:42 AM   Result Value Ref Range    Target  bpm    Exercise duration time 00:01:57     Stress Base Systolic  mmHg    Stress Base Diastolic BP 53 mmHg    Post peak  BPM    Baseline HR 80 BPM    Estimated workload 1.0 METS    Baseline  mmHg    O2 sat rest 97 %    Percent HR 67 %    O2 sat peak 100 %    Stress Base Diastolic BP 53 mmHg    Stress Rate Pressure Product 14,628 BPM*mmHg       Telemetry: normal sinus rhythm      Assessment:     Hospital Problems  Never Reviewed          Codes Class Noted POA    [facility] ICD-10-CM: J96.02  ICD-9-CM: 518.81  11/1/2020 Unknown              Plan:     Continue current therapy. Can go home from cardiac stand point if stress test negative. Keep NPO for now.     Marcelo Bhandari MD

## 2020-11-03 NOTE — PROGRESS NOTES
0700: [facility] END OF SHIFT REPORT      Bedside shift change report GIVEN TO MARIE Goodwin. Report included the following information SBAR, Kardex, Intake/Output, MAR, Recent Results and Cardiac Rhythm NSR.       SIGNIFICANT CHANGES DURING SHIFT:        CONCERNS TO ADDRESS WITH MD:          [facility] NURSING NOTE   Admission Date 10/31/2020   Admission Diagnosis Acute respiratory failure with hypercapnia ([address] [J96.02]   Consults IP CONSULT TO HOSPITALIST  IP CONSULT TO CARDIOLOGY  IP CONSULT TO PULMONOLOGY      Cardiac Monitoring [x] Yes [] No      Purposeful Hourly Rounding [x] Yes    Anna Score Total Score: 2   Anna score 3 or > [] Bed Alarm [] Avasys [] 1:1 sitter [] Patient refused (Signed refusal form in chart)   Gaston Score Gaston Score: 12   Gaston score 14 or < [] PMT consult [] Wound Care consult    []  Specialty bed  [] Nutrition consult      Influenza Vaccine             Oxygen needs? [] Room air Oxygen @  []1L    []2L    [x]3L   []4L    []5L   []6L via  NC   Chronic home O2 use?  [x] Yes [] No  Perform O2 challenge test and document in progress note using [employer] (.Homeoxygen)      Last bowel movement Last Bowel Movement Date: 11/02/20      Urinary Catheter       External Female Catheter 11/01/20-Urine Output (mL): 450 ml     LDAs               Peripheral IV 11/01/20 Anterior;Proximal;Right Forearm (Active)   Site Assessment Clean, dry, & intact 11/03/20 0314   Phlebitis Assessment 0 11/03/20 0314   Infiltration Assessment 0 11/03/20 0314   Dressing Status Clean, dry, & intact 11/03/20 0314   Dressing Type Tape;Transparent 11/03/20 0314   Hub Color/Line Status Blue 11/03/20 0314          External Female Catheter 11/01/20 (Active)   Site Assessment Clean, dry, & intact 11/02/20 1745   Repositioned Yes 11/02/20 1745   Perineal Care Yes 11/02/20 1745   Wick Changed Yes 11/02/20 1745   Suction Canister/Tubing Changed No 11/02/20 1745   Urine Output (mL) 450 ml 11/02/20 2354                   Readmission Risk Assessment Tool Score Low Risk            9       Total Score        4 IP Visits Last 12 Months (1-3=4, 4=9, >4=11)    5 Pt. Coverage (Medicare=5 , Medicaid, or Self-Pay=4)        Criteria that do not apply:    Has Seen PCP in Last 6 Months (Yes=3, No=0)    . Living with Significant Other. Assisted Living. LTAC. SNF.  or   Rehab    Patient Length of Stay (>5 days = 3)    Charlson Comorbidity Score (Age + Comorbid Conditions)       Expected Length of Stay - - -   Actual Length of Stay 2

## 2020-11-03 NOTE — PROGRESS NOTES
[facility] END OF SHIFT REPORT      Bedside shift change report GIVEN TO MARIE Anderson. Report included the following information SBAR. SIGNIFICANT CHANGES DURING SHIFT:    CONCERNS TO ADDRESS WITH MD:          [facility] NURSING NOTE   Admission Date 10/31/2020   Admission Diagnosis Acute respiratory failure with hypercapnia ([facility] [J96.02]   Consults IP CONSULT TO HOSPITALIST  IP CONSULT TO CARDIOLOGY  IP CONSULT TO PULMONOLOGY      Cardiac Monitoring [x] Yes [] No      Purposeful Hourly Rounding [x] Yes    Anna Score Total Score: 2   Anna score 3 or > [x] Bed Alarm [] Avasys [] 1:1 sitter [] Patient refused (Signed refusal form in chart)   Gaston Score Gaston Score: 12   Gaston score 14 or < [] PMT consult [] Wound Care consult    []  Specialty bed  [] Nutrition consult      Influenza Vaccine             Oxygen needs? [] Room air Oxygen @  []1L    []2L    [x]3L   []4L    []5L   []6L via  NC   Chronic home O2 use?  [] Yes [] No  Perform O2 challenge test and document in progress note using [employer] (.Homeoxygen)      Last bowel movement Last Bowel Movement Date: 11/01/20      Urinary Catheter       External Female Catheter 11/01/20-Urine Output (mL): 300 ml     LDAs               Peripheral IV 11/01/20 Anterior;Proximal;Right Forearm (Active)   Site Assessment Clean, dry, & intact 11/02/20 0414   Phlebitis Assessment 0 11/02/20 0414   Infiltration Assessment 0 11/02/20 0414   Dressing Status Clean, dry, & intact 11/02/20 0414   Dressing Type Tape;Transparent 11/02/20 0414   Hub Color/Line Status Blue 11/02/20 0414          External Female Catheter 11/01/20 (Active)   Site Assessment Clean, dry, & intact 11/01/20 1300   Repositioned Yes 11/01/20 1300   Perineal Care Yes 11/01/20 1300   Wick Changed Yes 11/01/20 1300   Suction Canister/Tubing Changed No 11/01/20 1300   Urine Output (mL) 300 ml 11/02/20 0601                   Readmission Risk Assessment Tool Score Low Risk            9       Total Score        4 IP Visits Last 12 Months (1-3=4, 4=9, >4=11)    5 Pt. Coverage (Medicare=5 , Medicaid, or Self-Pay=4)        Criteria that do not apply:    Has Seen PCP in Last 6 Months (Yes=3, No=0)    . Living with Significant Other. Assisted Living. LTAC. SNF.  or   Rehab    Patient Length of Stay (>5 days = 3)    Charlson Comorbidity Score (Age + Comorbid Conditions)       Expected Length of Stay - - -   Actual Length of Stay 1

## 2020-11-04 ENCOUNTER — HOME HEALTH ADMISSION (OUTPATIENT)
Dept: HOME HEALTH SERVICES | Facility: HOME HEALTH | Age: 60
End: 2020-11-04
Payer: MEDICARE

## 2020-11-04 VITALS
OXYGEN SATURATION: 93 % | BODY MASS INDEX: 51.91 KG/M2 | RESPIRATION RATE: 18 BRPM | DIASTOLIC BLOOD PRESSURE: 47 MMHG | WEIGHT: 293 LBS | SYSTOLIC BLOOD PRESSURE: 100 MMHG | HEART RATE: 89 BPM | TEMPERATURE: 99.1 F | HEIGHT: 63 IN

## 2020-11-04 PROBLEM — I48.91 ATRIAL FIBRILLATION (HCC): Status: ACTIVE | Noted: 2020-11-04

## 2020-11-04 PROBLEM — Z98.890 S/P CARDIAC CATH: Status: ACTIVE | Noted: 2020-11-04

## 2020-11-04 PROBLEM — J96.02 ACUTE RESPIRATORY FAILURE WITH HYPERCAPNIA (HCC): Status: RESOLVED | Noted: 2020-11-01 | Resolved: 2020-11-04

## 2020-11-04 LAB
ALBUMIN SERPL-MCNC: 2.5 G/DL (ref 3.5–5)
ALBUMIN/GLOB SERPL: 0.6 {RATIO} (ref 1.1–2.2)
ALP SERPL-CCNC: 81 U/L (ref 45–117)
ALT SERPL-CCNC: 28 U/L (ref 12–78)
ANION GAP SERPL CALC-SCNC: 2 MMOL/L (ref 5–15)
AST SERPL-CCNC: 57 U/L (ref 15–37)
BASOPHILS # BLD: 0 K/UL (ref 0–0.1)
BASOPHILS NFR BLD: 0 % (ref 0–1)
BILIRUB DIRECT SERPL-MCNC: 0.2 MG/DL (ref 0–0.2)
BILIRUB SERPL-MCNC: 0.8 MG/DL (ref 0.2–1)
BUN SERPL-MCNC: 16 MG/DL (ref 6–20)
BUN/CREAT SERPL: 29 (ref 12–20)
CALCIUM SERPL-MCNC: 8.7 MG/DL (ref 8.5–10.1)
CHLORIDE SERPL-SCNC: 94 MMOL/L (ref 97–108)
CO2 SERPL-SCNC: 43 MMOL/L (ref 21–32)
CREAT SERPL-MCNC: 0.55 MG/DL (ref 0.55–1.02)
DIFFERENTIAL METHOD BLD: ABNORMAL
EOSINOPHIL # BLD: 0.3 K/UL (ref 0–0.4)
EOSINOPHIL NFR BLD: 4 % (ref 0–7)
ERYTHROCYTE [DISTWIDTH] IN BLOOD BY AUTOMATED COUNT: 14.3 % (ref 11.5–14.5)
GLOBULIN SER CALC-MCNC: 4.3 G/DL (ref 2–4)
GLUCOSE BLD STRIP.AUTO-MCNC: 120 MG/DL (ref 65–100)
GLUCOSE BLD STRIP.AUTO-MCNC: 89 MG/DL (ref 65–100)
GLUCOSE SERPL-MCNC: 104 MG/DL (ref 65–100)
HCT VFR BLD AUTO: 39.5 % (ref 35–47)
HGB BLD-MCNC: 11.7 G/DL (ref 11.5–16)
IMM GRANULOCYTES # BLD AUTO: 0 K/UL (ref 0–0.04)
IMM GRANULOCYTES NFR BLD AUTO: 0 % (ref 0–0.5)
LYMPHOCYTES # BLD: 1.1 K/UL (ref 0.8–3.5)
LYMPHOCYTES NFR BLD: 16 % (ref 12–49)
MAGNESIUM SERPL-MCNC: 2.1 MG/DL (ref 1.6–2.4)
MCH RBC QN AUTO: 31 PG (ref 26–34)
MCHC RBC AUTO-ENTMCNC: 29.6 G/DL (ref 30–36.5)
MCV RBC AUTO: 104.5 FL (ref 80–99)
MONOCYTES # BLD: 0.5 K/UL (ref 0–1)
MONOCYTES NFR BLD: 7 % (ref 5–13)
NEUTS SEG # BLD: 5.1 K/UL (ref 1.8–8)
NEUTS SEG NFR BLD: 73 % (ref 32–75)
NRBC # BLD: 0 K/UL (ref 0–0.01)
NRBC BLD-RTO: 0 PER 100 WBC
PHOSPHATE SERPL-MCNC: 3.7 MG/DL (ref 2.6–4.7)
PLATELET # BLD AUTO: 122 K/UL (ref 150–400)
PMV BLD AUTO: 13 FL (ref 8.9–12.9)
POTASSIUM SERPL-SCNC: 3.9 MMOL/L (ref 3.5–5.1)
PROT SERPL-MCNC: 6.8 G/DL (ref 6.4–8.2)
RBC # BLD AUTO: 3.78 M/UL (ref 3.8–5.2)
SERVICE CMNT-IMP: ABNORMAL
SERVICE CMNT-IMP: NORMAL
SODIUM SERPL-SCNC: 139 MMOL/L (ref 136–145)
WBC # BLD AUTO: 7.1 K/UL (ref 3.6–11)

## 2020-11-04 PROCEDURE — 94640 AIRWAY INHALATION TREATMENT: CPT

## 2020-11-04 PROCEDURE — 74011250637 HC RX REV CODE- 250/637: Performed by: INTERNAL MEDICINE

## 2020-11-04 PROCEDURE — 74011250637 HC RX REV CODE- 250/637: Performed by: STUDENT IN AN ORGANIZED HEALTH CARE EDUCATION/TRAINING PROGRAM

## 2020-11-04 PROCEDURE — 85025 COMPLETE CBC W/AUTO DIFF WBC: CPT

## 2020-11-04 PROCEDURE — 80076 HEPATIC FUNCTION PANEL: CPT

## 2020-11-04 PROCEDURE — 77010033678 HC OXYGEN DAILY

## 2020-11-04 PROCEDURE — 99232 SBSQ HOSP IP/OBS MODERATE 35: CPT | Performed by: INTERNAL MEDICINE

## 2020-11-04 PROCEDURE — 74011000250 HC RX REV CODE- 250: Performed by: STUDENT IN AN ORGANIZED HEALTH CARE EDUCATION/TRAINING PROGRAM

## 2020-11-04 PROCEDURE — 74011000250 HC RX REV CODE- 250: Performed by: INTERNAL MEDICINE

## 2020-11-04 PROCEDURE — 84100 ASSAY OF PHOSPHORUS: CPT

## 2020-11-04 PROCEDURE — 82962 GLUCOSE BLOOD TEST: CPT

## 2020-11-04 PROCEDURE — 74011250636 HC RX REV CODE- 250/636: Performed by: INTERNAL MEDICINE

## 2020-11-04 PROCEDURE — 83735 ASSAY OF MAGNESIUM: CPT

## 2020-11-04 PROCEDURE — 80048 BASIC METABOLIC PNL TOTAL CA: CPT

## 2020-11-04 PROCEDURE — 36415 COLL VENOUS BLD VENIPUNCTURE: CPT

## 2020-11-04 RX ORDER — GUAIFENESIN 100 MG/5ML
81 LIQUID (ML) ORAL DAILY
Qty: 30 TAB | Refills: 0 | Status: ON HOLD | OUTPATIENT
Start: 2020-11-05 | End: 2022-04-21

## 2020-11-04 RX ORDER — DILTIAZEM HYDROCHLORIDE 120 MG/1
120 CAPSULE, COATED, EXTENDED RELEASE ORAL DAILY
Qty: 30 CAP | Refills: 0 | Status: SHIPPED | OUTPATIENT
Start: 2020-11-05 | End: 2020-11-24

## 2020-11-04 RX ADMIN — IPRATROPIUM BROMIDE AND ALBUTEROL SULFATE 3 ML: .5; 3 SOLUTION RESPIRATORY (INHALATION) at 07:41

## 2020-11-04 RX ADMIN — IPRATROPIUM BROMIDE AND ALBUTEROL SULFATE 3 ML: .5; 3 SOLUTION RESPIRATORY (INHALATION) at 11:49

## 2020-11-04 RX ADMIN — DILTIAZEM HYDROCHLORIDE 120 MG: 120 CAPSULE, COATED, EXTENDED RELEASE ORAL at 09:17

## 2020-11-04 RX ADMIN — ASPIRIN 81 MG CHEWABLE TABLET 81 MG: 81 TABLET CHEWABLE at 09:16

## 2020-11-04 RX ADMIN — IPRATROPIUM BROMIDE AND ALBUTEROL SULFATE 3 ML: .5; 3 SOLUTION RESPIRATORY (INHALATION) at 05:09

## 2020-11-04 RX ADMIN — BUDESONIDE 500 MCG: 0.5 INHALANT RESPIRATORY (INHALATION) at 07:41

## 2020-11-04 RX ADMIN — ENOXAPARIN SODIUM 135 MG: 150 INJECTION SUBCUTANEOUS at 03:52

## 2020-11-04 RX ADMIN — Medication 10 ML: at 09:27

## 2020-11-04 RX ADMIN — Medication 10 ML: at 03:52

## 2020-11-04 RX ADMIN — IPRATROPIUM BROMIDE AND ALBUTEROL SULFATE 3 ML: .5; 3 SOLUTION RESPIRATORY (INHALATION) at 00:32

## 2020-11-04 RX ADMIN — MICONAZOLE NITRATE: 20 CREAM TOPICAL at 07:30

## 2020-11-04 RX ADMIN — CASTOR OIL AND BALSAM, PERU: 788; 87 OINTMENT TOPICAL at 07:30

## 2020-11-04 RX ADMIN — FUROSEMIDE 40 MG: 40 TABLET ORAL at 09:17

## 2020-11-04 NOTE — PROGRESS NOTES
ADULT PROTOCOL: JET AEROSOL ASSESSMENT    Patient  Pablito Rosales     63 y.o.   female     11/4/2020  5:14 AM    Breath Sounds Pre Procedure: Right Breath Sounds: Diminished                               Left Breath Sounds: Diminished    Breath Sounds Post Procedure: Right Breath Sounds: Diminished                                 Left Breath Sounds: Diminished    Breathing pattern: Pre procedure Breathing Pattern: Regular          Post procedure Breathing Pattern: Regular    Heart Rate: Pre procedure Pulse: 71           Post procedure Pulse: 80    Resp Rate: Pre procedure Respirations: 20           Post procedure Respirations: 20    Oxygen: O2 Device: Nasal cannula   Flow rate (L/min) 4     Changed: NO    SpO2: Pre procedure SpO2: 95 %   with oxygen              Post procedure SpO2: 95 %  with oxygen    Nebulizer Therapy: Current medications Aerosolized Medications: DuoNeb Q4, Pulmicort BID      Changed: NO    Smoking History:  Former Smoker  Problem List:   Patient Active Problem List   Diagnosis Code    COVID-19 ruled out Z03.818    [facility] J96.02       Respiratory Therapist: Jenifer Dawkins

## 2020-11-04 NOTE — PROGRESS NOTES
Problem: Falls - Risk of  Goal: *Absence of Falls  Description: Document Winston Watterson Fall Risk and appropriate interventions in the flowsheet.   Outcome: Progressing Towards Goal  Note: Fall Risk Interventions:       Mentation Interventions: Adequate sleep, hydration, pain control    Medication Interventions: Evaluate medications/consider consulting pharmacy    Elimination Interventions: Call light in reach

## 2020-11-04 NOTE — PROGRESS NOTES
Problem: Falls - Risk of  Goal: *Absence of Falls  Description: Document Nomi Mariscal Fall Risk and appropriate interventions in the flowsheet.   Outcome: Progressing Towards Goal  Note: Fall Risk Interventions:       Mentation Interventions: Adequate sleep, hydration, pain control    Medication Interventions: Evaluate medications/consider consulting pharmacy    Elimination Interventions: Call light in reach

## 2020-11-04 NOTE — ROUTINE PROCESS
Pt discharged per MD order. Pt has all personal belongings and discharge instructions. Discharge instructions gone over with pt. Pt aware of both her cardiology appointment and her follow-up tele-medicine appointment tomorrow. Pt does not have any further questions regarding discharge. Pt d/c'd via [facility]. Pt's IV discontinued as pt is being discharged. Catheter tip intact, no redness or swelling noted at insertion site. Band-aid applied.

## 2020-11-04 NOTE — PROGRESS NOTES
[address], [place], [address] [facility]  [phone]      Cardiology Progress Note      11/4/2020 9:42 AM    Admit Date: 10/31/2020    Admit Diagnosis:   Acute respiratory failure with hypercapnia (HCC) [J96.02]    Subjective: Kike Kinney has no c/o CP, SOB, remains in SR.   S/p normal cardiac cath 11/3/2020    Visit Vitals  BP (!) 114/45   Pulse 88   Temp 97.6 °F (36.4 °C)   Resp 20   Ht 5' 3\" (1.6 m)   Wt 313 lb (142 kg) Comment: Patient bed was not zeroed when switched to specialty bed   SpO2 100%   BMI 55.45 kg/m²       Current Facility-Administered Medications   Medication Dose Route Frequency    montelukast (SINGULAIR) tablet 10 mg  10 mg Oral QHS    budesonide (PULMICORT) 500 mcg/2 ml nebulizer suspension  500 mcg Nebulization BID RT    furosemide (LASIX) tablet 40 mg  40 mg Oral DAILY    0.9% sodium chloride infusion  100 mL/hr IntraVENous CONTINUOUS    aspirin chewable tablet 81 mg  81 mg Oral DAILY    sodium chloride (NS) flush 5-40 mL  5-40 mL IntraVENous Q8H    sodium chloride (NS) flush 5-40 mL  5-40 mL IntraVENous PRN    acetaminophen (TYLENOL) tablet 650 mg  650 mg Oral Q4H PRN    naloxone (NARCAN) injection 0.4 mg  0.4 mg IntraVENous PRN    enoxaparin (LOVENOX) injection 135 mg ++partial syringe++  135 mg SubCUTAneous Q12H    sodium chloride (NS) flush 10 mL  10 mL IntraVENous PRN    atorvastatin (LIPITOR) tablet 10 mg  10 mg Oral QHS    metoprolol tartrate (LOPRESSOR) tablet 25 mg  25 mg Oral BID    nitroglycerin (NITROSTAT) tablet 0.4 mg  0.4 mg SubLINGual Q5MIN PRN    sodium chloride (NS) flush 5-40 mL  5-40 mL IntraVENous Q8H    sodium chloride (NS) flush 5-40 mL  5-40 mL IntraVENous PRN    acetaminophen (TYLENOL) tablet 650 mg  650 mg Oral Q6H PRN    Or    acetaminophen (TYLENOL) suppository 650 mg  650 mg Rectal Q6H PRN    polyethylene glycol (MIRALAX) packet 17 g  17 g Oral DAILY PRN    promethazine (PHENERGAN) tablet 12.5 mg  12.5 mg Oral Q6H PRN    Or    ondansetron (ZOFRAN) injection 4 mg  4 mg IntraVENous Q6H PRN    albuterol-ipratropium (DUO-NEB) 2.5 MG-0.5 MG/3 ML  3 mL Nebulization Q4H RT    dilTIAZem ER (CARDIZEM CD) capsule 120 mg  120 mg Oral DAILY    miconazole (SECURA) 2 % extra thick cream   Topical BID    balsam peru-castor oiL (VENELEX) ointment   Topical BID       Objective:      Physical Exam:  General Appearance:  obese  female in no acute distress  Chest:   diminished  Cardiovascular:  Regular rate and rhythm, no murmur. Abdomen:   Soft, non-tender, bowel sounds are active. Extremities: trace edema; right radial site D/I  Skin:  Warm and dry. Data Review:   Recent Labs     11/04/20 0345 11/02/20 0126   WBC 7.1 7.3   HGB 11.7 12.3   HCT 39.5 41.4   * 131*     Recent Labs     11/04/20 0345 11/02/20 0126    136   K 3.9 3.5   CL 94* 86*   CO2 43* >45*   * 102*   BUN 16 18   CREA 0.55 0.62   CA 8.7 9.3   MG 2.1  --    PHOS 3.7  --    ALB 2.5*  --    TBILI 0.8  --    ALT 28  --        No results for input(s): TROIQ, CPK, CKMB in the last 72 hours. Intake/Output Summary (Last 24 hours) at 11/4/2020 0948  Last data filed at 11/4/2020 7911  Gross per 24 hour   Intake 1370 ml   Output 1750 ml   Net -380 ml        Telemetry: SR     Assessment:     Principal Problem:    Acute respiratory failure with hypercapnia ([address] (11/1/2020)    Active Problems:    Atrial fibrillation ([address] (11/4/2020)      S/P cardiac cath (11/4/2020)      Overview: 11/3/2020 nonobstructive disease        Plan:     Paroxysmal Atrial fibrillation:  Remains in SR  Likely r/t respiratory failure which is improving. Continue on Dilt 120mg SR and metoprolol 25mg BID. Continues on ASA alone as patient is not a candidate for NOAC or [address] [place] [address]. She cannot afford NOAC and with her noncompliance with medications and f/u, hesitant to start coumadin.    Cardiac cath normal, no CAD    Shira Leal [facility]  Cardiology      [facility]    11/4/2020         Patient seen, examined by me personally. Plan discussed as detailed. Agree with note as outlined by  NP. I confirm findings in history and physical exam. No additional findings noted. Agree with plan as outlined above. F/u as needed.     Enedelia Tapia MD

## 2020-11-04 NOTE — DISCHARGE SUMMARY
Hospitalist Discharge Summary     Patient ID:  Ed Velasquez  [id number]  63 y.o.  1960  10/31/2020    PCP on record: Elian Escamilla NP    Admit date: 10/31/2020  Discharge date and time: 11/4/2020    DISCHARGE DIAGNOSIS:  Acute metabolic encephalopathy, POA, resolved  Acute on chronic hypoxic and hypercapnic RF, POA, improving  Acute COPD exacerbation POA, improving     Atrial fib with RVR    CONSULTATIONS:  IP CONSULT TO HOSPITALIST  IP CONSULT TO CARDIOLOGY  IP CONSULT TO PULMONOLOGY    Excerpted HPI from H&P of Gildardo Lopez MD:    The patient is a 63years old woman with past medical history of substance apnea, COPD, diastolic heart failure, obesity, diabetes mellitus type 2, paroxysmal A. fib presented emergency department by EMS after they were called by the patient's daughter due to change in mentation. No family available at bedside, history was obtained from EMR and ED physician. Was reported that the family was trying to waking up the patient however she was not responding to them. They called the EMS. When I saw the patient she was alert oriented x4 reported that she can tomorrow happen but she has not been having any fevers, chills, cough, nausea, vomiting, diarrhea, chest pain, abdominal pain or any other symptoms. ______________________________________________________________________  DISCHARGE SUMMARY/HOSPITAL COURSE:  for full details see H&P, daily progress notes, labs, consult notes. Acute metabolic encephalopathy, POA, resolved  Acute on chronic hypoxic and hypercapnic RF, POA, improving  Acute COPD exacerbation POA, improving  CXR with no acute process. Stable cardiac silhouette enlargement with central pulmonary vascular congestion. probnp 1K  Encephalopathy resolved with treatment of acute hypercapnia.  PCO2 on admission 88. 5.  She was weaned off bipap.  She was empiric IV abx, prednisone but was discontinued by pulm     - continue nebs  - furosemide  - Continue supplemental oxygen     Atrial fib with RVR  HTN  RRT called for CP 11/1  Pt started on amiodarone gtt d/t borderline low bp. Georgena Courser converted to NSR.  Cardiology evaluated, transitioned to PO cardizem and cont' PTA BB  Echo in July 2020 showed EF 50-55%.  Mild concentric hypertrophy        -Per CM, NOAC and enoxaparin will be cost prohibitive due to out of pocket expense, warfarin not ideal due to concern with compliance, frequent lab monitoring. Dr Jose Enrique Alba Discussed with Dr. Alyce Pedro. \"Although, AC would be ideal, it is  acceptable to continue with aspirin 81 mg daily given above mentioned issues with AC\". Will dc on ASA 81 mg daily   -Stress test 11/2 showing questionable inferior ischemia. Cardiac cath 1/04 with no CAD.         _______________________________________________________________________  Patient seen and examined by me on discharge day. Pertinent Findings:  Gen:    Not in distress  Chest: Clear lungs  CVS:   Regular rhythm. No edema  Abd:  Soft, not distended, not tender  Neuro:  Alert,   _______________________________________________________________________  DISCHARGE MEDICATIONS:   Current Discharge Medication List      START taking these medications    Details   aspirin 81 mg chewable tablet Take 1 Tab by mouth daily. Qty: 30 Tab, Refills: 0      dilTIAZem ER (CARDIZEM CD) 120 mg capsule Take 1 Cap by mouth daily. Qty: 30 Cap, Refills: 0         CONTINUE these medications which have NOT CHANGED    Details   OXYGEN-AIR DELIVERY SYSTEMS 3 L/min by Nasal route continuous. furosemide (LASIX) 40 mg tablet Take 1 Tab by mouth daily. Qty: 30 Tab, Refills: 0      atorvastatin (LIPITOR) 10 mg tablet Take 10 mg by mouth nightly. metoprolol (LOPRESSOR) 25 mg tablet Take 25 mg by mouth two (2) times a day.       bismuth subsalicylate (PEPTO-BISMOL MAXIMUM STRENGTH) 525 mg/15 mL susp Take 30 mL by mouth as needed for Diarrhea.      multivitamin, tx-iron-ca-min (THERA-M w/ IRON) 9 mg iron-400 mcg tab tablet Take 1 Tab by mouth daily. acetaminophen (TYLENOL) 325 mg tablet Take 650 mg by mouth every six (6) hours as needed for Pain. nitroglycerin (Nitrostat) 0.4 mg SL tablet 0.4 mg by SubLINGual route every five (5) minutes as needed for Chest Pain. Up to 3 doses. STOP taking these medications       azithromycin (Zithromax Z-Andrés) 250 mg tablet Comments:   Reason for Stopping:         apixaban (Eliquis) 5 mg tablet Comments:   Reason for Stopping:                 Patient Follow Up Instructions:    Activity: Activity as tolerated  Diet: Resume previous diet  Wound Care: None needed      Follow-up Information     Follow up With Specialties Details Why Contact Info    Elian Escamilla NP Nurse Practitioner On 11/5/2020 For Telephone hospital appointment at 3:00PM [address] [place] [address]  [phone]      Ricardo Feng NP Nurse Practitioner On 11/18/2020 at [place] [phone]          ________________________________________________________________    Risk of deterioration: Low    Condition at Discharge:  Stable  __________________________________________________________________    Disposition  Home with family, no needs    ____________________________________________________________________    Code Status: Full Code  ___________________________________________________________________      Total time in minutes spent coordinating this discharge (includes going over instructions, follow-up, prescriptions, and preparing report for sign off to her PCP) :  >30 minutes    Signed:  Gildardo Lopez MD

## 2020-11-04 NOTE — PROGRESS NOTES
2:05PM:  Patient has not been picked up by [facility]; [facility] called by CM and according to [facility], transport was cancelled by [facility] Luna, unknown to CM. Nurses were asked and they were not aware of anyone cancelling the transport; transport still needed; rescheduled by [facility] for 5PM;  Nurse Fito Posadas, patient and daughter Lesley Cushing notified. Patient is ready for discharge from care manager viewpoint    [address] today with [facility]; referral made via [facility] and orders entered for [facility], PT, OT;  Awaiting acceptance by [facility]    Daughter Lesley Cushing [phone] advised of discharge and someone will be at the house when patient arrives;    Transportation will be provided by [facility] for 12:15-12:30 pm  today; explained transport needs to [place] [facility] at [facility]:  Patient is on oxygen; No ramp and there are 4 steps outside and 1 step inside for patient to be transported; patient is bed bound and her wheelchair does not fit between doorways to her bedroom; [facility] staff will use special stretcher and bring wheelchair with them to assist with transport. Preferred Rx is [facility] at [facility] and daughter stated that she will find ride to  patient's medications.     2nd IM letter was given, signed and placed on chart 11/3/20    Follow appointments on AVS.    Victorino Tucker, [address] [facility].  Ext [phone]

## 2020-11-04 NOTE — ROUTINE PROCESS
Pt had an uneventful night. Used Bi-PAP for a few hours while sleeping. O2@ 3 lpm/ nc used per home usage. Change of shift report given to Lonnie. MARIE. She will assume care of pt.

## 2020-11-06 ENCOUNTER — HOME CARE VISIT (OUTPATIENT)
Dept: SCHEDULING | Facility: HOME HEALTH | Age: 60
End: 2020-11-06
Payer: MEDICARE

## 2020-11-06 VITALS
TEMPERATURE: 98 F | OXYGEN SATURATION: 94 % | DIASTOLIC BLOOD PRESSURE: 64 MMHG | RESPIRATION RATE: 28 BRPM | SYSTOLIC BLOOD PRESSURE: 110 MMHG | HEART RATE: 78 BPM

## 2020-11-06 PROCEDURE — 3331090001 HH PPS REVENUE CREDIT

## 2020-11-06 PROCEDURE — G0151 HHCP-SERV OF PT,EA 15 MIN: HCPCS

## 2020-11-06 PROCEDURE — G0299 HHS/HOSPICE OF RN EA 15 MIN: HCPCS

## 2020-11-06 PROCEDURE — 400013 HH SOC

## 2020-11-06 PROCEDURE — A6252 ABSORPT DRG >16 <=48 W/O BDR: HCPCS

## 2020-11-06 PROCEDURE — 3331090002 HH PPS REVENUE DEBIT

## 2020-11-06 PROCEDURE — A6250 SKIN SEAL PROTECT MOISTURIZR: HCPCS

## 2020-11-07 VITALS
DIASTOLIC BLOOD PRESSURE: 76 MMHG | RESPIRATION RATE: 20 BRPM | SYSTOLIC BLOOD PRESSURE: 112 MMHG | OXYGEN SATURATION: 92 % | TEMPERATURE: 97.7 F | HEART RATE: 88 BPM

## 2020-11-07 PROCEDURE — 3331090002 HH PPS REVENUE DEBIT

## 2020-11-07 PROCEDURE — 3331090001 HH PPS REVENUE CREDIT

## 2020-11-08 PROCEDURE — 3331090001 HH PPS REVENUE CREDIT

## 2020-11-08 PROCEDURE — 3331090002 HH PPS REVENUE DEBIT

## 2020-11-09 ENCOUNTER — HOME CARE VISIT (OUTPATIENT)
Dept: SCHEDULING | Facility: HOME HEALTH | Age: 60
End: 2020-11-09
Payer: MEDICARE

## 2020-11-09 VITALS
HEART RATE: 74 BPM | DIASTOLIC BLOOD PRESSURE: 62 MMHG | SYSTOLIC BLOOD PRESSURE: 130 MMHG | OXYGEN SATURATION: 95 % | TEMPERATURE: 98.9 F

## 2020-11-09 PROCEDURE — G0300 HHS/HOSPICE OF LPN EA 15 MIN: HCPCS

## 2020-11-09 PROCEDURE — 3331090002 HH PPS REVENUE DEBIT

## 2020-11-09 PROCEDURE — 3331090001 HH PPS REVENUE CREDIT

## 2020-11-10 ENCOUNTER — HOME CARE VISIT (OUTPATIENT)
Dept: SCHEDULING | Facility: HOME HEALTH | Age: 60
End: 2020-11-10
Payer: MEDICARE

## 2020-11-10 VITALS
TEMPERATURE: 97.8 F | OXYGEN SATURATION: 95 % | SYSTOLIC BLOOD PRESSURE: 112 MMHG | HEART RATE: 74 BPM | DIASTOLIC BLOOD PRESSURE: 64 MMHG

## 2020-11-10 PROCEDURE — 3331090001 HH PPS REVENUE CREDIT

## 2020-11-10 PROCEDURE — G0152 HHCP-SERV OF OT,EA 15 MIN: HCPCS

## 2020-11-10 PROCEDURE — 3331090002 HH PPS REVENUE DEBIT

## 2020-11-11 ENCOUNTER — HOME CARE VISIT (OUTPATIENT)
Dept: SCHEDULING | Facility: HOME HEALTH | Age: 60
End: 2020-11-11
Payer: MEDICARE

## 2020-11-11 VITALS — OXYGEN SATURATION: 90 % | HEART RATE: 90 BPM | TEMPERATURE: 97.3 F

## 2020-11-11 VITALS
DIASTOLIC BLOOD PRESSURE: 72 MMHG | OXYGEN SATURATION: 93 % | TEMPERATURE: 97.2 F | HEART RATE: 82 BPM | RESPIRATION RATE: 18 BRPM | SYSTOLIC BLOOD PRESSURE: 130 MMHG

## 2020-11-11 PROCEDURE — 3331090002 HH PPS REVENUE DEBIT

## 2020-11-11 PROCEDURE — 3331090001 HH PPS REVENUE CREDIT

## 2020-11-11 PROCEDURE — G0299 HHS/HOSPICE OF RN EA 15 MIN: HCPCS

## 2020-11-11 PROCEDURE — G0151 HHCP-SERV OF PT,EA 15 MIN: HCPCS

## 2020-11-12 PROCEDURE — 3331090001 HH PPS REVENUE CREDIT

## 2020-11-12 PROCEDURE — 3331090002 HH PPS REVENUE DEBIT

## 2020-11-13 ENCOUNTER — HOME CARE VISIT (OUTPATIENT)
Dept: SCHEDULING | Facility: HOME HEALTH | Age: 60
End: 2020-11-13
Payer: MEDICARE

## 2020-11-13 ENCOUNTER — HOME CARE VISIT (OUTPATIENT)
Dept: HOME HEALTH SERVICES | Facility: HOME HEALTH | Age: 60
End: 2020-11-13
Payer: MEDICARE

## 2020-11-13 VITALS
SYSTOLIC BLOOD PRESSURE: 110 MMHG | OXYGEN SATURATION: 98 % | DIASTOLIC BLOOD PRESSURE: 50 MMHG | TEMPERATURE: 97.3 F | RESPIRATION RATE: 18 BRPM | HEART RATE: 87 BPM

## 2020-11-13 PROCEDURE — G0299 HHS/HOSPICE OF RN EA 15 MIN: HCPCS

## 2020-11-13 PROCEDURE — 3331090002 HH PPS REVENUE DEBIT

## 2020-11-13 PROCEDURE — 3331090001 HH PPS REVENUE CREDIT

## 2020-11-14 PROCEDURE — 3331090002 HH PPS REVENUE DEBIT

## 2020-11-14 PROCEDURE — 3331090001 HH PPS REVENUE CREDIT

## 2020-11-15 PROBLEM — E78.2 MIXED HYPERLIPIDEMIA: Status: ACTIVE | Noted: 2020-11-15

## 2020-11-15 PROBLEM — E66.01 MORBID OBESITY (HCC): Status: ACTIVE | Noted: 2020-11-15

## 2020-11-15 PROCEDURE — 3331090002 HH PPS REVENUE DEBIT

## 2020-11-15 PROCEDURE — 3331090001 HH PPS REVENUE CREDIT

## 2020-11-16 ENCOUNTER — HOME CARE VISIT (OUTPATIENT)
Dept: SCHEDULING | Facility: HOME HEALTH | Age: 60
End: 2020-11-16
Payer: MEDICARE

## 2020-11-16 ENCOUNTER — HOME CARE VISIT (OUTPATIENT)
Dept: HOME HEALTH SERVICES | Facility: HOME HEALTH | Age: 60
End: 2020-11-16
Payer: MEDICARE

## 2020-11-16 VITALS
DIASTOLIC BLOOD PRESSURE: 52 MMHG | TEMPERATURE: 97.3 F | HEART RATE: 86 BPM | OXYGEN SATURATION: 91 % | SYSTOLIC BLOOD PRESSURE: 100 MMHG | RESPIRATION RATE: 16 BRPM

## 2020-11-16 PROCEDURE — 3331090001 HH PPS REVENUE CREDIT

## 2020-11-16 PROCEDURE — 3331090002 HH PPS REVENUE DEBIT

## 2020-11-16 PROCEDURE — G0299 HHS/HOSPICE OF RN EA 15 MIN: HCPCS

## 2020-11-17 PROCEDURE — 3331090001 HH PPS REVENUE CREDIT

## 2020-11-17 PROCEDURE — 3331090002 HH PPS REVENUE DEBIT

## 2020-11-18 ENCOUNTER — APPOINTMENT (OUTPATIENT)
Dept: GENERAL RADIOLOGY | Age: 60
DRG: 273 | End: 2020-11-18
Attending: STUDENT IN AN ORGANIZED HEALTH CARE EDUCATION/TRAINING PROGRAM
Payer: MEDICARE

## 2020-11-18 ENCOUNTER — HOSPITAL ENCOUNTER (INPATIENT)
Age: 60
LOS: 6 days | Discharge: HOME | DRG: 273 | End: 2020-11-24
Attending: STUDENT IN AN ORGANIZED HEALTH CARE EDUCATION/TRAINING PROGRAM | Admitting: INTERNAL MEDICINE
Payer: MEDICARE

## 2020-11-18 DIAGNOSIS — I48.0 PAROXYSMAL ATRIAL FIBRILLATION (HCC): ICD-10-CM

## 2020-11-18 DIAGNOSIS — J96.22 ACUTE ON CHRONIC RESPIRATORY FAILURE WITH HYPERCAPNIA (HCC): Primary | ICD-10-CM

## 2020-11-18 DIAGNOSIS — J96.22 ACUTE ON CHRONIC RESPIRATORY FAILURE WITH HYPOXIA AND HYPERCAPNIA (HCC): ICD-10-CM

## 2020-11-18 DIAGNOSIS — J96.21 ACUTE ON CHRONIC RESPIRATORY FAILURE WITH HYPOXIA AND HYPERCAPNIA (HCC): ICD-10-CM

## 2020-11-18 DIAGNOSIS — G47.33 OSA (OBSTRUCTIVE SLEEP APNEA): ICD-10-CM

## 2020-11-18 DIAGNOSIS — I48.92 ATRIAL FLUTTER, UNSPECIFIED TYPE (HCC): ICD-10-CM

## 2020-11-18 DIAGNOSIS — E78.2 MIXED HYPERLIPIDEMIA: Chronic | ICD-10-CM

## 2020-11-18 DIAGNOSIS — I48.92 ATRIAL FLUTTER WITH RAPID VENTRICULAR RESPONSE (HCC): ICD-10-CM

## 2020-11-18 DIAGNOSIS — E66.01 MORBID OBESITY (HCC): ICD-10-CM

## 2020-11-18 PROBLEM — J96.00 ACUTE RESPIRATORY FAILURE (HCC): Status: ACTIVE | Noted: 2020-11-18

## 2020-11-18 LAB
ALBUMIN SERPL-MCNC: 2.9 G/DL (ref 3.5–5)
ALBUMIN/GLOB SERPL: 0.5 {RATIO} (ref 1.1–2.2)
ALP SERPL-CCNC: 141 U/L (ref 45–117)
ALT SERPL-CCNC: 40 U/L (ref 12–78)
ANION GAP SERPL CALC-SCNC: ABNORMAL MMOL/L (ref 5–15)
ARTERIAL PATENCY WRIST A: ABNORMAL
ARTERIAL PATENCY WRIST A: YES
AST SERPL-CCNC: 63 U/L (ref 15–37)
ATRIAL RATE: 69 BPM
B PERT DNA SPEC QL NAA+PROBE: NOT DETECTED
BASOPHILS # BLD: 0 K/UL (ref 0–0.1)
BASOPHILS NFR BLD: 0 % (ref 0–1)
BDY SITE: ABNORMAL
BDY SITE: ABNORMAL
BILIRUB SERPL-MCNC: 0.5 MG/DL (ref 0.2–1)
BNP SERPL-MCNC: 966 PG/ML
BORDETELLA PARAPERTUSSIS PCR, BORPAR: NOT DETECTED
BUN SERPL-MCNC: 22 MG/DL (ref 6–20)
BUN/CREAT SERPL: 39 (ref 12–20)
C PNEUM DNA SPEC QL NAA+PROBE: NOT DETECTED
CA-I BLD-SCNC: 1.25 MMOL/L (ref 1.12–1.32)
CA-I BLD-SCNC: 1.31 MMOL/L (ref 1.12–1.32)
CALCIUM SERPL-MCNC: 10 MG/DL (ref 8.5–10.1)
CALCULATED P AXIS, ECG09: 62 DEGREES
CALCULATED R AXIS, ECG10: 69 DEGREES
CALCULATED T AXIS, ECG11: 49 DEGREES
CHLORIDE SERPL-SCNC: 89 MMOL/L (ref 97–108)
CO2 SERPL-SCNC: >45 MMOL/L (ref 21–32)
COVID-19 RAPID TEST, COVR: NOT DETECTED
CREAT SERPL-MCNC: 0.57 MG/DL (ref 0.55–1.02)
CRP SERPL-MCNC: 0.43 MG/DL (ref 0–0.6)
D DIMER PPP FEU-MCNC: 1.48 MG/L FEU (ref 0–0.65)
DIAGNOSIS, 93000: NORMAL
DIFFERENTIAL METHOD BLD: ABNORMAL
EOSINOPHIL # BLD: 0.2 K/UL (ref 0–0.4)
EOSINOPHIL NFR BLD: 2 % (ref 0–7)
ERYTHROCYTE [DISTWIDTH] IN BLOOD BY AUTOMATED COUNT: 13.4 % (ref 11.5–14.5)
FERRITIN SERPL-MCNC: 159 NG/ML (ref 26–388)
FIBRINOGEN PPP-MCNC: 581 MG/DL (ref 200–475)
FLUAV AG NPH QL IA: NEGATIVE
FLUAV H1 2009 PAND RNA SPEC QL NAA+PROBE: NOT DETECTED
FLUAV H1 RNA SPEC QL NAA+PROBE: NOT DETECTED
FLUAV H3 RNA SPEC QL NAA+PROBE: NOT DETECTED
FLUAV SUBTYP SPEC NAA+PROBE: NOT DETECTED
FLUBV AG NOSE QL IA: NEGATIVE
FLUBV RNA SPEC QL NAA+PROBE: NOT DETECTED
GAS FLOW.O2 O2 DELIVERY SYS: ABNORMAL L/MIN
GAS FLOW.O2 O2 DELIVERY SYS: ABNORMAL L/MIN
GAS FLOW.O2 SETTING OXYMISER: 6 L/M
GLOBULIN SER CALC-MCNC: 5.8 G/DL (ref 2–4)
GLUCOSE BLD STRIP.AUTO-MCNC: 107 MG/DL (ref 65–100)
GLUCOSE SERPL-MCNC: 140 MG/DL (ref 65–100)
HADV DNA SPEC QL NAA+PROBE: NOT DETECTED
HCOV 229E RNA SPEC QL NAA+PROBE: NOT DETECTED
HCOV HKU1 RNA SPEC QL NAA+PROBE: NOT DETECTED
HCOV NL63 RNA SPEC QL NAA+PROBE: NOT DETECTED
HCOV OC43 RNA SPEC QL NAA+PROBE: NOT DETECTED
HCT VFR BLD AUTO: 49.3 % (ref 35–47)
HEALTH STATUS, XMCV2T: NORMAL
HGB BLD-MCNC: 13.9 G/DL (ref 11.5–16)
HMPV RNA SPEC QL NAA+PROBE: NOT DETECTED
HPIV1 RNA SPEC QL NAA+PROBE: NOT DETECTED
HPIV2 RNA SPEC QL NAA+PROBE: NOT DETECTED
HPIV3 RNA SPEC QL NAA+PROBE: NOT DETECTED
HPIV4 RNA SPEC QL NAA+PROBE: NOT DETECTED
IMM GRANULOCYTES # BLD AUTO: 0.1 K/UL (ref 0–0.04)
IMM GRANULOCYTES NFR BLD AUTO: 1 % (ref 0–0.5)
INR PPP: 1 (ref 0.9–1.1)
LACTATE SERPL-SCNC: 1.3 MMOL/L (ref 0.4–2)
LDH SERPL L TO P-CCNC: 200 U/L (ref 81–246)
LYMPHOCYTES # BLD: 0.9 K/UL (ref 0.8–3.5)
LYMPHOCYTES NFR BLD: 11 % (ref 12–49)
M PNEUMO DNA SPEC QL NAA+PROBE: NOT DETECTED
MAGNESIUM SERPL-MCNC: 2.4 MG/DL (ref 1.6–2.4)
MCH RBC QN AUTO: 31.7 PG (ref 26–34)
MCHC RBC AUTO-ENTMCNC: 28.2 G/DL (ref 30–36.5)
MCV RBC AUTO: 112.6 FL (ref 80–99)
MONOCYTES # BLD: 0.5 K/UL (ref 0–1)
MONOCYTES NFR BLD: 6 % (ref 5–13)
NEUTS SEG # BLD: 6.7 K/UL (ref 1.8–8)
NEUTS SEG NFR BLD: 80 % (ref 32–75)
NRBC # BLD: 0 K/UL (ref 0–0.01)
NRBC BLD-RTO: 0 PER 100 WBC
O2/TOTAL GAS SETTING VFR VENT: 50 %
P-R INTERVAL, ECG05: 152 MS
PCO2 BLD: >90 MMHG (ref 35–45)
PCO2 BLD: >90 MMHG (ref 35–45)
PEEP RESPIRATORY: 6 CMH2O
PH BLD: 7.21 [PH] (ref 7.35–7.45)
PH BLD: 7.23 [PH] (ref 7.35–7.45)
PIP ISTAT,IPIP: 20
PLATELET # BLD AUTO: 157 K/UL (ref 150–400)
PMV BLD AUTO: 12.7 FL (ref 8.9–12.9)
PO2 BLD: 40 MMHG (ref 80–100)
PO2 BLD: 74 MMHG (ref 80–100)
POTASSIUM SERPL-SCNC: 4.8 MMOL/L (ref 3.5–5.1)
PROCALCITONIN SERPL-MCNC: 0.06 NG/ML
PROT SERPL-MCNC: 8.7 G/DL (ref 6.4–8.2)
PROTHROMBIN TIME: 10.9 SEC (ref 9–11.1)
Q-T INTERVAL, ECG07: 412 MS
QRS DURATION, ECG06: 80 MS
QTC CALCULATION (BEZET), ECG08: 441 MS
RBC # BLD AUTO: 4.38 M/UL (ref 3.8–5.2)
RBC MORPH BLD: ABNORMAL
RBC MORPH BLD: ABNORMAL
RSV RNA SPEC QL NAA+PROBE: NOT DETECTED
RV+EV RNA SPEC QL NAA+PROBE: NOT DETECTED
SERVICE CMNT-IMP: ABNORMAL
SODIUM SERPL-SCNC: 141 MMOL/L (ref 136–145)
SOURCE, COVRS: NORMAL
SPECIMEN SOURCE, FCOV2M: NORMAL
SPECIMEN TYPE, XMCV1T: NORMAL
SPECIMEN TYPE: ABNORMAL
SPECIMEN TYPE: ABNORMAL
TOTAL RESP. RATE, ITRR: 20
TOTAL RESP. RATE, ITRR: 21
TROPONIN I SERPL-MCNC: <0.05 NG/ML
VENTRICULAR RATE, ECG03: 69 BPM
WBC # BLD AUTO: 8.4 K/UL (ref 3.6–11)

## 2020-11-18 PROCEDURE — 85025 COMPLETE CBC W/AUTO DIFF WBC: CPT

## 2020-11-18 PROCEDURE — 85384 FIBRINOGEN ACTIVITY: CPT

## 2020-11-18 PROCEDURE — 65660000000 HC RM CCU STEPDOWN

## 2020-11-18 PROCEDURE — 36600 WITHDRAWAL OF ARTERIAL BLOOD: CPT

## 2020-11-18 PROCEDURE — 74011636637 HC RX REV CODE- 636/637: Performed by: INTERNAL MEDICINE

## 2020-11-18 PROCEDURE — 93005 ELECTROCARDIOGRAM TRACING: CPT

## 2020-11-18 PROCEDURE — 96365 THER/PROPH/DIAG IV INF INIT: CPT

## 2020-11-18 PROCEDURE — 83735 ASSAY OF MAGNESIUM: CPT

## 2020-11-18 PROCEDURE — 99285 EMERGENCY DEPT VISIT HI MDM: CPT

## 2020-11-18 PROCEDURE — 96375 TX/PRO/DX INJ NEW DRUG ADDON: CPT

## 2020-11-18 PROCEDURE — 71045 X-RAY EXAM CHEST 1 VIEW: CPT

## 2020-11-18 PROCEDURE — 3331090002 HH PPS REVENUE DEBIT

## 2020-11-18 PROCEDURE — 3331090001 HH PPS REVENUE CREDIT

## 2020-11-18 PROCEDURE — 82803 BLOOD GASES ANY COMBINATION: CPT

## 2020-11-18 PROCEDURE — 87804 INFLUENZA ASSAY W/OPTIC: CPT

## 2020-11-18 PROCEDURE — 77030013038 HC MSK CPAP FISP -A

## 2020-11-18 PROCEDURE — 80053 COMPREHEN METABOLIC PANEL: CPT

## 2020-11-18 PROCEDURE — 74011000250 HC RX REV CODE- 250: Performed by: INTERNAL MEDICINE

## 2020-11-18 PROCEDURE — 87635 SARS-COV-2 COVID-19 AMP PRB: CPT

## 2020-11-18 PROCEDURE — 77030012793 HC CIRC VNTLTR FISP -B

## 2020-11-18 PROCEDURE — 83605 ASSAY OF LACTIC ACID: CPT

## 2020-11-18 PROCEDURE — 74011250637 HC RX REV CODE- 250/637: Performed by: INTERNAL MEDICINE

## 2020-11-18 PROCEDURE — 2709999900 HC NON-CHARGEABLE SUPPLY

## 2020-11-18 PROCEDURE — 86140 C-REACTIVE PROTEIN: CPT

## 2020-11-18 PROCEDURE — 85610 PROTHROMBIN TIME: CPT

## 2020-11-18 PROCEDURE — 83615 LACTATE (LD) (LDH) ENZYME: CPT

## 2020-11-18 PROCEDURE — 96366 THER/PROPH/DIAG IV INF ADDON: CPT

## 2020-11-18 PROCEDURE — 0100U RESPIRATORY PANEL,PCR,NASOPHARYNGEAL: CPT

## 2020-11-18 PROCEDURE — 74011250636 HC RX REV CODE- 250/636: Performed by: INTERNAL MEDICINE

## 2020-11-18 PROCEDURE — 83880 ASSAY OF NATRIURETIC PEPTIDE: CPT

## 2020-11-18 PROCEDURE — 84145 PROCALCITONIN (PCT): CPT

## 2020-11-18 PROCEDURE — 82728 ASSAY OF FERRITIN: CPT

## 2020-11-18 PROCEDURE — 85379 FIBRIN DEGRADATION QUANT: CPT

## 2020-11-18 PROCEDURE — 94660 CPAP INITIATION&MGMT: CPT

## 2020-11-18 PROCEDURE — 94640 AIRWAY INHALATION TREATMENT: CPT

## 2020-11-18 PROCEDURE — 84484 ASSAY OF TROPONIN QUANT: CPT

## 2020-11-18 PROCEDURE — 87040 BLOOD CULTURE FOR BACTERIA: CPT

## 2020-11-18 PROCEDURE — 36415 COLL VENOUS BLD VENIPUNCTURE: CPT

## 2020-11-18 PROCEDURE — 82962 GLUCOSE BLOOD TEST: CPT

## 2020-11-18 RX ORDER — ACETAMINOPHEN 325 MG/1
650 TABLET ORAL
Status: DISCONTINUED | OUTPATIENT
Start: 2020-11-18 | End: 2020-11-24 | Stop reason: HOSPADM

## 2020-11-18 RX ORDER — IPRATROPIUM BROMIDE AND ALBUTEROL SULFATE 2.5; .5 MG/3ML; MG/3ML
3 SOLUTION RESPIRATORY (INHALATION)
Status: DISCONTINUED | OUTPATIENT
Start: 2020-11-18 | End: 2020-11-18

## 2020-11-18 RX ORDER — ONDANSETRON 2 MG/ML
4 INJECTION INTRAMUSCULAR; INTRAVENOUS
Status: DISCONTINUED | OUTPATIENT
Start: 2020-11-18 | End: 2020-11-24 | Stop reason: HOSPADM

## 2020-11-18 RX ORDER — FUROSEMIDE 40 MG/1
40 TABLET ORAL DAILY
Status: DISCONTINUED | OUTPATIENT
Start: 2020-11-19 | End: 2020-11-24 | Stop reason: HOSPADM

## 2020-11-18 RX ORDER — FUROSEMIDE 10 MG/ML
40 INJECTION INTRAMUSCULAR; INTRAVENOUS DAILY
Status: DISCONTINUED | OUTPATIENT
Start: 2020-11-19 | End: 2020-11-21

## 2020-11-18 RX ORDER — DEXTROSE 50 % IN WATER (D50W) INTRAVENOUS SYRINGE
12.5-25 AS NEEDED
Status: DISCONTINUED | OUTPATIENT
Start: 2020-11-18 | End: 2020-11-24 | Stop reason: HOSPADM

## 2020-11-18 RX ORDER — GUAIFENESIN 100 MG/5ML
81 LIQUID (ML) ORAL DAILY
Status: DISCONTINUED | OUTPATIENT
Start: 2020-11-19 | End: 2020-11-24 | Stop reason: HOSPADM

## 2020-11-18 RX ORDER — MAGNESIUM SULFATE 100 %
4 CRYSTALS MISCELLANEOUS AS NEEDED
Status: DISCONTINUED | OUTPATIENT
Start: 2020-11-18 | End: 2020-11-24 | Stop reason: HOSPADM

## 2020-11-18 RX ORDER — METOPROLOL TARTRATE 25 MG/1
25 TABLET, FILM COATED ORAL 2 TIMES DAILY
Status: DISCONTINUED | OUTPATIENT
Start: 2020-11-18 | End: 2020-11-24 | Stop reason: HOSPADM

## 2020-11-18 RX ORDER — SODIUM CHLORIDE 0.9 % (FLUSH) 0.9 %
5-40 SYRINGE (ML) INJECTION EVERY 8 HOURS
Status: DISCONTINUED | OUTPATIENT
Start: 2020-11-18 | End: 2020-11-24 | Stop reason: HOSPADM

## 2020-11-18 RX ORDER — DILTIAZEM HYDROCHLORIDE 120 MG/1
120 CAPSULE, COATED, EXTENDED RELEASE ORAL DAILY
Status: DISCONTINUED | OUTPATIENT
Start: 2020-11-19 | End: 2020-11-19

## 2020-11-18 RX ORDER — ENOXAPARIN SODIUM 100 MG/ML
40 INJECTION SUBCUTANEOUS EVERY 12 HOURS
Status: DISCONTINUED | OUTPATIENT
Start: 2020-11-18 | End: 2020-11-20

## 2020-11-18 RX ORDER — PROMETHAZINE HYDROCHLORIDE 25 MG/1
12.5 TABLET ORAL
Status: DISCONTINUED | OUTPATIENT
Start: 2020-11-18 | End: 2020-11-24 | Stop reason: HOSPADM

## 2020-11-18 RX ORDER — SODIUM CHLORIDE 0.9 % (FLUSH) 0.9 %
5-40 SYRINGE (ML) INJECTION AS NEEDED
Status: DISCONTINUED | OUTPATIENT
Start: 2020-11-18 | End: 2020-11-24 | Stop reason: HOSPADM

## 2020-11-18 RX ORDER — ACETAMINOPHEN 650 MG/1
650 SUPPOSITORY RECTAL
Status: DISCONTINUED | OUTPATIENT
Start: 2020-11-18 | End: 2020-11-24 | Stop reason: HOSPADM

## 2020-11-18 RX ORDER — LEVOFLOXACIN 5 MG/ML
750 INJECTION, SOLUTION INTRAVENOUS EVERY 24 HOURS
Status: COMPLETED | OUTPATIENT
Start: 2020-11-18 | End: 2020-11-22

## 2020-11-18 RX ORDER — ATORVASTATIN CALCIUM 10 MG/1
10 TABLET, FILM COATED ORAL
Status: DISCONTINUED | OUTPATIENT
Start: 2020-11-18 | End: 2020-11-24 | Stop reason: HOSPADM

## 2020-11-18 RX ORDER — POLYETHYLENE GLYCOL 3350 17 G/17G
17 POWDER, FOR SOLUTION ORAL DAILY PRN
Status: DISCONTINUED | OUTPATIENT
Start: 2020-11-18 | End: 2020-11-24 | Stop reason: HOSPADM

## 2020-11-18 RX ORDER — METOPROLOL TARTRATE 5 MG/5ML
5 INJECTION INTRAVENOUS
Status: DISCONTINUED | OUTPATIENT
Start: 2020-11-18 | End: 2020-11-24 | Stop reason: HOSPADM

## 2020-11-18 RX ORDER — INSULIN LISPRO 100 [IU]/ML
INJECTION, SOLUTION INTRAVENOUS; SUBCUTANEOUS EVERY 6 HOURS
Status: DISCONTINUED | OUTPATIENT
Start: 2020-11-18 | End: 2020-11-20

## 2020-11-18 RX ORDER — IPRATROPIUM BROMIDE AND ALBUTEROL SULFATE 2.5; .5 MG/3ML; MG/3ML
3 SOLUTION RESPIRATORY (INHALATION)
Status: DISCONTINUED | OUTPATIENT
Start: 2020-11-18 | End: 2020-11-20

## 2020-11-18 RX ADMIN — METHYLPREDNISOLONE SODIUM SUCCINATE 40 MG: 40 INJECTION, POWDER, FOR SOLUTION INTRAMUSCULAR; INTRAVENOUS at 23:30

## 2020-11-18 RX ADMIN — METHYLPREDNISOLONE SODIUM SUCCINATE 40 MG: 40 INJECTION, POWDER, FOR SOLUTION INTRAMUSCULAR; INTRAVENOUS at 19:12

## 2020-11-18 RX ADMIN — Medication 1 G: at 23:33

## 2020-11-18 RX ADMIN — INSULIN LISPRO 2 UNITS: 100 INJECTION, SOLUTION INTRAVENOUS; SUBCUTANEOUS at 19:20

## 2020-11-18 RX ADMIN — Medication 10 ML: at 21:36

## 2020-11-18 RX ADMIN — IPRATROPIUM BROMIDE AND ALBUTEROL SULFATE 3 ML: .5; 3 SOLUTION RESPIRATORY (INHALATION) at 20:18

## 2020-11-18 RX ADMIN — LEVOFLOXACIN 750 MG: 5 INJECTION, SOLUTION INTRAVENOUS at 19:06

## 2020-11-18 RX ADMIN — ENOXAPARIN SODIUM 40 MG: 40 INJECTION SUBCUTANEOUS at 21:34

## 2020-11-18 RX ADMIN — ACETAZOLAMIDE SODIUM 500 MG: 500 INJECTION, POWDER, LYOPHILIZED, FOR SOLUTION INTRAVENOUS at 22:10

## 2020-11-18 NOTE — PROGRESS NOTES
Venous blood gas drawn at 1416 on Non rebreather  Ph 7.213  CO2 >90.0  PO2 40  Ionized calcium 1.25          Arterial blood gas drawn at 1639 on Bipap 20/10 rate 20 FiO2 50%  Ph 7.231  CO2 >90  PO2 74  Ionized calcium 1.31

## 2020-11-18 NOTE — Clinical Note
Sheath #1: sheath exchanged for INTRODUCER ANGELINA DENNISON 0.038 IN 3 MM 8 FRX60 CM DIL FAST-CATH. Hemostasis achieved.  Sheath aspirated and flushed

## 2020-11-18 NOTE — Clinical Note
TRANSFER - IN REPORT:     Verbal report received from: [facility] RN. Report consisted of patient's Situation, Background, Assessment and   Recommendations(SBAR). Opportunity for questions and clarification was provided. Assessment completed upon patient's arrival to unit and care assumed. Patient transported with a Registered Nurse.

## 2020-11-18 NOTE — H&P
Hospitalist Admission Note    NAME: Cassius Mullen   :  1960   MRN:  [id number]     Date/Time:  2020 4:29 PM    Patient PCP: Catarina Samson NP  ______________________________________________________________________  Given the patient's current clinical presentation, I have a high level of concern for decompensation if discharged from the emergency department. Complex decision making was performed, which includes reviewing the patient's available past medical records, laboratory results, and x-ray films. My assessment of this patient's clinical condition and my plan of care is as follows. Assessment / Plan:  Acute encephalopathy secondary to hypercapnia, POA  Acute respiratory failure with hypoxia and hypercapnia, POA  COPD exacerbation/obesity hypoventilationobstructive sleep apnea  Respiratory acidosis with metabolic alkalosis POA  Likely secondary to combination of COPD, diastolic heart failure, obstructive sleep apnea, obesity hypoventilation syndrome  -pH is 7.23, PCO2 is greater than 90  Chronically on oxygen 3 to 4 L via nasal cannula at home  Most recently discharged 2 weeks ago with similar complaints  Placed on BiPAP in the ED, continue with BiPAP  Started on DuoNeb, I will start IV Solu-Medrol 40 every 6  Hold Lasix, will add Diamox IV x2 due to metabolic alkalosis  Daily weights  JAILENE's  Most recent echo 2020 EF with 50 to 55%  Chest x-ray shows interstitial edema versus infiltrates  -Calcitonin level is low  -Rapid COVID-19 is negative, will check PCR  -Check respiratory panel and influenza  -Empirically start Levaquin     Paroxysmal A.  Fib  -Currently in sinus rhythm  -Hold metoprolol and Cardizem as patient is extremely lethargic  -As needed IV Lopressor for heart rate greater than 110  Not on anticoagulation as an outpatient due to compliance issues  -Continue aspirin     Diabetes mellitus type 2  Most recent A1c 6%  Not on any medication  Use sliding scale insulin while on steroids     Impaired mobility, POA  Secondary to chronic DJD of the knees and morbid obesity     Morbid obesity  Due to excess caloric intake     Code Status: Full code     DVT Prophylaxis: Anticoagulated with Eliquis  GI Prophylaxis: not indicated     Baseline: Chronically bedridden due to above          Subjective:   CHIEF COMPLAINT: Altered mental status    HISTORY OF PRESENT ILLNESS:     Suleiman Penny is a 63 y.o.  female with past medical history significant for morbid obesity, COPD, obstructive sleep apnea, paroxysmal atrial fibrillation and diet-controlled diabetes who was recently discharged 2 weeks ago when she was admitted for respiratory failure with hypercapnia and CO2 narcosis. Patient was noted to have rapid atrial fibrillation for which she was started on Cardizem. She has not started on anticoagulation due to noncompliance. She presents with the complaints of altered mental status. Patient herself is on BiPAP and is extremely lethargic, she responds to tactile stimulus. She is unable to provide any history. History is obtained from EMS records and ED physician. According to EMS records EMS was called as patient was noted to be confused and altered mental status. She was noticed to be hypoxic when EMS arrived with oxygen saturation 80% on room air and was placed on nonrebreather. She is brought in the ED where blood gas showed PCO2 greater than 90. Chest x-ray showed bibasilar infiltrates. We were asked to admit for work up and evaluation of the above problems.      Past Medical History:   Diagnosis Date    Atrial fibrillation ([facility] [address] 11/4/2020    CHF (congestive heart failure) (HCC)     Diabetes ([facility] [address]     Hypertension     Ill-defined condition     high cholesterol    Ill-defined condition     tachycardia    Other and unspecified symptoms and signs involving general sensations and perceptions     ruptured disc    S/P cardiac cath 11/4/2020    11/3/2020 nonobstructive disease        No past surgical history on file. Social History     Tobacco Use    Smoking status: Former Smoker    Smokeless tobacco: Never Used   Substance Use Topics    Alcohol use: No        No family history on file. Unable to obtain  Allergies   Allergen Reactions    Cefazolin Rash    Other Plant, Animal, Environmental Rash     Ivory soap caused rash on hands. Prior to Admission medications    Medication Sig Start Date End Date Taking? Authorizing Provider   menthol-zinc oxide (Moisture Barrier Ointment) 0.44-20.6 % oint Apply 1 Each to affected area three (3) times daily. thin layer moisture associated lesion    Provider, Historical   aspirin 81 mg chewable tablet Take 1 Tab by mouth daily. 11/5/20   Nile Good MD   dilTIAZem ER (CARDIZEM CD) 120 mg capsule Take 1 Cap by mouth daily. 11/5/20   Nile Good MD   OXYGEN-AIR DELIVERY SYSTEMS 3 L/min by Nasal route continuous. 8/31/20   Billy Christy NP   bismuth subsalicylate (PEPTO-BISMOL MAXIMUM STRENGTH) 525 mg/15 mL susp Take 30 mL by mouth daily as needed for Diarrhea or Nausea. Provider, Historical   acetaminophen (TYLENOL) 325 mg tablet Take 650 mg by mouth every six (6) hours as needed for Pain. Provider, Historical   furosemide (LASIX) 40 mg tablet Take 1 Tab by mouth daily. 8/7/20   Kermit Schneider MD   atorvastatin (LIPITOR) 10 mg tablet Take 10 mg by mouth nightly. Russ Cano MD   metoprolol (LOPRESSOR) 25 mg tablet Take 25 mg by mouth two (2) times a day. Russ Cano MD       REVIEW OF SYSTEMS:     I am not able to complete the review of systems because:    The patient is intubated and sedated   x The patient has altered mental status due to his acute medical problems    The patient has baseline aphasia from prior stroke(s)    The patient has baseline dementia and is not reliable historian    The patient is in acute medical distress and unable to provide information           Total of 12 systems reviewed as follows:       POSITIVE= underlined text  Negative = text not underlined  General:  fever, chills, sweats, generalized weakness, weight loss/gain,      loss of appetite   Eyes:    blurred vision, eye pain, loss of vision, double vision  ENT:    rhinorrhea, pharyngitis   Respiratory:   cough, sputum production, SOB, CHOPRA, wheezing, pleuritic pain   Cardiology:   chest pain, palpitations, orthopnea, PND, edema, syncope   Gastrointestinal:  abdominal pain , N/V, diarrhea, dysphagia, constipation, bleeding   Genitourinary:  frequency, urgency, dysuria, hematuria, incontinence   Muskuloskeletal :  arthralgia, myalgia, back pain  Hematology:  easy bruising, nose or gum bleeding, lymphadenopathy   Dermatological: rash, ulceration, pruritis, color change / jaundice  Endocrine:   hot flashes or polydipsia   Neurological:  headache, dizziness, confusion, focal weakness, paresthesia,     Speech difficulties, memory loss, gait difficulty  Psychological: Feelings of anxiety, depression, agitation    Objective:   VITALS:    Visit Vitals  /63   Pulse 69   Temp 97.9 °F (36.6 °C)   Resp 22   Ht 5' 3\" (1.6 m)   Wt 139.4 kg (307 lb 5.1 oz)   SpO2 92%   BMI 54.44 kg/m²       PHYSICAL EXAM:    General:    Patient is extremely lethargic, responds to repeated tactile stimulus by opening her eyes. Patient is on BiPAP     HEENT: Atraumatic, anicteric sclerae, pink conjunctivae     No oral ulcers, mucosa moist, throat clear, dentition fair  Neck:  Supple, symmetrical,  thyroid: non tender  Lungs:   Extremely poor air entry in all lung fields. No Wheezing or Rhonchi. No rales. Chest wall:  No tenderness  No Accessory muscle use. Heart:   Regular  rhythm,  No  murmur   No edema  Abdomen:   Soft, non-tender. Not distended. Bowel sounds normal  Extremities: No cyanosis. No clubbing,      Skin turgor normal, Capillary refill normal, Radial dial pulse 2+  Skin:     Not pale.   Not Jaundiced  No rashes   Neurologic: Reports are reactive to light, responds to repeated tactile stimulus by opening her eyes. No facial asymmetry noticed    _______________________________________________________________________  Care Plan discussed with:    Comments   Patient     Family      RN x    Care Manager                    Consultant:      _______________________________________________________________________  Expected  Disposition:   Home with Family x   HH/PT/OT/RN    SNF/LTC    JUAN M    ________________________________________________________________________  TOTAL TIME:  61 Minutes    Critical Care Provided     Minutes non procedure based      Comments    x Reviewed previous records   >50% of visit spent in counseling and coordination of care  Discussion with patient and/or family and questions answered       ________________________________________________________________________  Signed: Helena Alexander MD    Procedures: see electronic medical records for all procedures/Xrays and details which were not copied into this note but were reviewed prior to creation of Plan. LAB DATA REVIEWED:    Recent Results (from the past 24 hour(s))   CBC WITH AUTOMATED DIFF    Collection Time: 11/18/20 12:13 PM   Result Value Ref Range    WBC 8.4 3.6 - 11.0 K/uL    RBC 4.38 3.80 - 5.20 M/uL    HGB 13.9 11.5 - 16.0 g/dL    HCT 49.3 (H) 35.0 - 47.0 %    .6 (H) 80.0 - 99.0 FL    MCH 31.7 26.0 - 34.0 PG    MCHC 28.2 (L) 30.0 - 36.5 g/dL    RDW 13.4 11.5 - 14.5 %    PLATELET 505 931 - 347 K/uL    MPV 12.7 8.9 - 12.9 FL    NRBC 0.0 0  WBC    ABSOLUTE NRBC 0.00 0.00 - 0.01 K/uL    NEUTROPHILS 80 (H) 32 - 75 %    LYMPHOCYTES 11 (L) 12 - 49 %    MONOCYTES 6 5 - 13 %    EOSINOPHILS 2 0 - 7 %    BASOPHILS 0 0 - 1 %    IMMATURE GRANULOCYTES 1 (H) 0.0 - 0.5 %    ABS. NEUTROPHILS 6.7 1.8 - 8.0 K/UL    ABS. LYMPHOCYTES 0.9 0.8 - 3.5 K/UL    ABS. MONOCYTES 0.5 0.0 - 1.0 K/UL    ABS.  EOSINOPHILS 0.2 0.0 - 0.4 K/UL ABS. BASOPHILS 0.0 0.0 - 0.1 K/UL    ABS. IMM. GRANS. 0.1 (H) 0.00 - 0.04 K/UL    DF SMEAR SCANNED      RBC COMMENTS STOMATOCYTES  1+        RBC COMMENTS MACROCYTOSIS  1+       METABOLIC PANEL, COMPREHENSIVE    Collection Time: 11/18/20 12:13 PM   Result Value Ref Range    Sodium 141 136 - 145 mmol/L    Potassium 4.8 3.5 - 5.1 mmol/L    Chloride 89 (L) 97 - 108 mmol/L    CO2 >45 (HH) 21 - 32 mmol/L    Anion gap Cannot be calculated 5 - 15 mmol/L    Glucose 140 (H) 65 - 100 mg/dL    BUN 22 (H) 6 - 20 MG/DL    Creatinine 0.57 0.55 - 1.02 MG/DL    BUN/Creatinine ratio 39 (H) 12 - 20      GFR est AA >60 >60 ml/min/1.73m2    GFR est non-AA >60 >60 ml/min/1.73m2    Calcium 10.0 8.5 - 10.1 MG/DL    Bilirubin, total 0.5 0.2 - 1.0 MG/DL    ALT (SGPT) 40 12 - 78 U/L    AST (SGOT) 63 (H) 15 - 37 U/L    Alk.  phosphatase 141 (H) 45 - 117 U/L    Protein, total 8.7 (H) 6.4 - 8.2 g/dL    Albumin 2.9 (L) 3.5 - 5.0 g/dL    Globulin 5.8 (H) 2.0 - 4.0 g/dL    A-G Ratio 0.5 (L) 1.1 - 2.2     TROPONIN I    Collection Time: 11/18/20 12:13 PM   Result Value Ref Range    Troponin-I, Qt. <0.05 <0.05 ng/mL   EKG, 12 LEAD, INITIAL    Collection Time: 11/18/20 12:16 PM   Result Value Ref Range    Ventricular Rate 69 BPM    Atrial Rate 69 BPM    P-R Interval 152 ms    QRS Duration 80 ms    Q-T Interval 412 ms    QTC Calculation (Bezet) 441 ms    Calculated P Axis 62 degrees    Calculated R Axis 69 degrees    Calculated T Axis 49 degrees    Diagnosis       Normal sinus rhythm  Inferior infarct (cited on or before 18-NOV-2020)     Confirmed by Claretha Dubin M.D. ([id number]) on 11/18/2020 12:58:27 PM     MAGNESIUM    Collection Time: 11/18/20 12:36 PM   Result Value Ref Range    Magnesium 2.4 1.6 - 2.4 mg/dL   PROTHROMBIN TIME + INR    Collection Time: 11/18/20 12:36 PM   Result Value Ref Range    INR 1.0 0.9 - 1.1      Prothrombin time 10.9 9.0 - 11.1 sec   LACTIC ACID    Collection Time: 11/18/20 12:36 PM   Result Value Ref Range    Lactic acid 1.3 0.4 - 2.0 MMOL/L   NT-PRO BNP    Collection Time: 11/18/20 12:36 PM   Result Value Ref Range    NT pro- (H) <125 PG/ML   PROCALCITONIN    Collection Time: 11/18/20 12:36 PM   Result Value Ref Range    Procalcitonin 0.06 ng/mL   LD    Collection Time: 11/18/20 12:36 PM   Result Value Ref Range     81 - 246 U/L   D DIMER    Collection Time: 11/18/20 12:36 PM   Result Value Ref Range    D-dimer 1.48 (H) 0.00 - 0.65 mg/L FEU   FIBRINOGEN    Collection Time: 11/18/20 12:36 PM   Result Value Ref Range    Fibrinogen 581 (H) 200 - 475 mg/dL   SARS-COV-2    Collection Time: 11/18/20  3:04 PM   Result Value Ref Range    Specimen source Nasopharyngeal      Specimen source Nasopharyngeal      COVID-19 rapid test Not detected NOTD      Specimen type NP Swab      Health status Symptomatic Testing

## 2020-11-18 NOTE — ED TRIAGE NOTES
Pt came from home by EMS. Pts family called EMS due to breathing issues. EMS stated that PT was at 80% on room air. Pt placed on non-re breather mask at 10L. Pts complains of right knee pain.   PT  by EMS

## 2020-11-18 NOTE — ED PROVIDER NOTES
EMERGENCY DEPARTMENT HISTORY AND PHYSICAL EXAM      Date: 11/18/2020  Patient Name: Jose Maria Barnes    History of Presenting Illness     Chief Complaint   Patient presents with    Shortness of Breath    Altered mental status    Knee Pain     Pt states right knee pain         HPI: Jose Maria Barnes, 63 y.o. female presents to the ED with cc of altered mental status. Patient reports feeling short of breath. She otherwise denies any pain or complaints. Per her family, she has been more altered than usual the last several days. She denies any chest pain, denies cough or fever. Denies any abdominal pain, vomiting or diarrhea. Patient seems slightly confused, she is alert, however history is difficult to obtain as she only intermittently answers questions. There are no other complaints, changes, or physical findings at this time. PCP: Geneva Gill NP    No current facility-administered medications on file prior to encounter. Current Outpatient Medications on File Prior to Encounter   Medication Sig Dispense Refill    menthol-zinc oxide (Moisture Barrier Ointment) 0.44-20.6 % oint Apply 1 Each to affected area three (3) times daily. thin layer moisture associated lesion      aspirin 81 mg chewable tablet Take 1 Tab by mouth daily. 30 Tab 0    dilTIAZem ER (CARDIZEM CD) 120 mg capsule Take 1 Cap by mouth daily. 30 Cap 0    OXYGEN-AIR DELIVERY SYSTEMS 3 L/min by Nasal route continuous.  bismuth subsalicylate (PEPTO-BISMOL MAXIMUM STRENGTH) 525 mg/15 mL susp Take 30 mL by mouth daily as needed for Diarrhea or Nausea.  acetaminophen (TYLENOL) 325 mg tablet Take 650 mg by mouth every six (6) hours as needed for Pain.  furosemide (LASIX) 40 mg tablet Take 1 Tab by mouth daily. 30 Tab 0    atorvastatin (LIPITOR) 10 mg tablet Take 10 mg by mouth nightly.  metoprolol (LOPRESSOR) 25 mg tablet Take 25 mg by mouth two (2) times a day.          Past History     Past Medical History:  Past Medical History:   Diagnosis Date    Atrial fibrillation ([facility] [address] 11/4/2020    CHF (congestive heart failure) (HCC)     Diabetes ([facility] [address]     Hypertension     Ill-defined condition     high cholesterol    Ill-defined condition     tachycardia    Other and unspecified symptoms and signs involving general sensations and perceptions     ruptured disc    S/P cardiac cath 11/4/2020    11/3/2020 nonobstructive disease       Past Surgical History:  No past surgical history on file. Family History:  No family history on file. Social History:  Social History     Tobacco Use    Smoking status: Former Smoker    Smokeless tobacco: Never Used   Substance Use Topics    Alcohol use: No    Drug use: No       Allergies: Allergies   Allergen Reactions    Cefazolin Rash    Other Plant, Animal, Environmental Rash     Ivory soap caused rash on hands. Review of Systems   no fever  No eye pain  No ear pain  Reports shortness of breath  no chest pain  no abdominal pain  no dysuria  No arm pain  No rash  No lymphadenopathy  No weight loss    Physical Exam   Physical Exam  Constitutional:       Appearance: She is obese. Comments: Respirations nonlabored on nonrebreather   HENT:      Head: Normocephalic. Eyes:      Extraocular Movements: Extraocular movements intact. Neck:      Musculoskeletal: Neck supple. Cardiovascular:      Rate and Rhythm: Normal rate and regular rhythm. Pulmonary:      Comments: Breath sounds difficult to auscultate due to patient body habitus, she becomes mildly tachypneic on minimal exertion, no obvious wheezing  Abdominal:      Palpations: Abdomen is soft. Tenderness: There is no abdominal tenderness. Neurological:      Mental Status: She is alert.       Comments: Patient answers simple questions appropriately, however then becomes slightly sleepy, easily arousable, moves all extremities         Diagnostic Study Results     Labs -     Recent Results (from the past 24 hour(s))   CBC WITH AUTOMATED DIFF    Collection Time: 11/18/20 12:13 PM   Result Value Ref Range    WBC 8.4 3.6 - 11.0 K/uL    RBC 4.38 3.80 - 5.20 M/uL    HGB 13.9 11.5 - 16.0 g/dL    HCT 49.3 (H) 35.0 - 47.0 %    .6 (H) 80.0 - 99.0 FL    MCH 31.7 26.0 - 34.0 PG    MCHC 28.2 (L) 30.0 - 36.5 g/dL    RDW 13.4 11.5 - 14.5 %    PLATELET 353 899 - 911 K/uL    MPV 12.7 8.9 - 12.9 FL    NRBC 0.0 0  WBC    ABSOLUTE NRBC 0.00 0.00 - 0.01 K/uL    NEUTROPHILS 80 (H) 32 - 75 %    LYMPHOCYTES 11 (L) 12 - 49 %    MONOCYTES 6 5 - 13 %    EOSINOPHILS 2 0 - 7 %    BASOPHILS 0 0 - 1 %    IMMATURE GRANULOCYTES 1 (H) 0.0 - 0.5 %    ABS. NEUTROPHILS 6.7 1.8 - 8.0 K/UL    ABS. LYMPHOCYTES 0.9 0.8 - 3.5 K/UL    ABS. MONOCYTES 0.5 0.0 - 1.0 K/UL    ABS. EOSINOPHILS 0.2 0.0 - 0.4 K/UL    ABS. BASOPHILS 0.0 0.0 - 0.1 K/UL    ABS. IMM. GRANS. 0.1 (H) 0.00 - 0.04 K/UL    DF SMEAR SCANNED      RBC COMMENTS STOMATOCYTES  1+        RBC COMMENTS MACROCYTOSIS  1+       METABOLIC PANEL, COMPREHENSIVE    Collection Time: 11/18/20 12:13 PM   Result Value Ref Range    Sodium 141 136 - 145 mmol/L    Potassium 4.8 3.5 - 5.1 mmol/L    Chloride 89 (L) 97 - 108 mmol/L    CO2 >45 (HH) 21 - 32 mmol/L    Anion gap Cannot be calculated 5 - 15 mmol/L    Glucose 140 (H) 65 - 100 mg/dL    BUN 22 (H) 6 - 20 MG/DL    Creatinine 0.57 0.55 - 1.02 MG/DL    BUN/Creatinine ratio 39 (H) 12 - 20      GFR est AA >60 >60 ml/min/1.73m2    GFR est non-AA >60 >60 ml/min/1.73m2    Calcium 10.0 8.5 - 10.1 MG/DL    Bilirubin, total 0.5 0.2 - 1.0 MG/DL    ALT (SGPT) 40 12 - 78 U/L    AST (SGOT) 63 (H) 15 - 37 U/L    Alk.  phosphatase 141 (H) 45 - 117 U/L    Protein, total 8.7 (H) 6.4 - 8.2 g/dL    Albumin 2.9 (L) 3.5 - 5.0 g/dL    Globulin 5.8 (H) 2.0 - 4.0 g/dL    A-G Ratio 0.5 (L) 1.1 - 2.2     TROPONIN I    Collection Time: 11/18/20 12:13 PM   Result Value Ref Range    Troponin-I, Qt. <0.05 <0.05 ng/mL   EKG, 12 LEAD, INITIAL    Collection Time: 11/18/20 12:16 PM   Result Value Ref Range    Ventricular Rate 69 BPM    Atrial Rate 69 BPM    P-R Interval 152 ms    QRS Duration 80 ms    Q-T Interval 412 ms    QTC Calculation (Bezet) 441 ms    Calculated P Axis 62 degrees    Calculated R Axis 69 degrees    Calculated T Axis 49 degrees    Diagnosis       Normal sinus rhythm  Inferior infarct (cited on or before 18-NOV-2020)     Confirmed by Porter Condon M.D. ([id number]) on 11/18/2020 12:58:27 PM     MAGNESIUM    Collection Time: 11/18/20 12:36 PM   Result Value Ref Range    Magnesium 2.4 1.6 - 2.4 mg/dL   PROTHROMBIN TIME + INR    Collection Time: 11/18/20 12:36 PM   Result Value Ref Range    INR 1.0 0.9 - 1.1      Prothrombin time 10.9 9.0 - 11.1 sec   LACTIC ACID    Collection Time: 11/18/20 12:36 PM   Result Value Ref Range    Lactic acid 1.3 0.4 - 2.0 MMOL/L   NT-PRO BNP    Collection Time: 11/18/20 12:36 PM   Result Value Ref Range    NT pro- (H) <125 PG/ML   PROCALCITONIN    Collection Time: 11/18/20 12:36 PM   Result Value Ref Range    Procalcitonin 0.06 ng/mL   LD    Collection Time: 11/18/20 12:36 PM   Result Value Ref Range     81 - 246 U/L   D DIMER    Collection Time: 11/18/20 12:36 PM   Result Value Ref Range    D-dimer 1.48 (H) 0.00 - 0.65 mg/L FEU   FIBRINOGEN    Collection Time: 11/18/20 12:36 PM   Result Value Ref Range    Fibrinogen 581 (H) 200 - 475 mg/dL   SARS-COV-2    Collection Time: 11/18/20  3:04 PM   Result Value Ref Range    Specimen source Nasopharyngeal      Specimen source Nasopharyngeal      COVID-19 rapid test Not detected NOTD      Specimen type NP Swab      Health status Symptomatic Testing         Radiologic Studies -   XR CHEST PORT   Final Result   IMPRESSION: There is increasing interstitial and vague airspace opacities in the   lungs may represent edema versus atypical pneumonia. .. Cardiomegaly appears   stable allowing for differences in technique. .              CT Results  (Last 48 hours)    None        CXR Results (Last 48 hours)               11/18/20 1355  XR CHEST PORT Final result    Impression:  IMPRESSION: There is increasing interstitial and vague airspace opacities in the   lungs may represent edema versus atypical pneumonia. .. Cardiomegaly appears   stable allowing for differences in technique. .               Narrative:  EXAM:  XR CHEST PORT       INDICATION:  sob       COMPARISON:  2020       FINDINGS: A portable AP radiograph of the chest was obtained at 1345 hours. Patient is rotated to the left. The patient is on a cardiac monitor. There is   increasing interstitial and vague pulmonary opacities in the mid to lower lung   zones may represent edema but is nonspecific. .  Cardiomegaly is stable. .  There   are degenerative changes in the thoracic spine. Medical Decision Making   I am the first provider for this patient. I reviewed the vital signs, available nursing notes, past medical history, past surgical history, family history and social history. Vital Signs-Reviewed the patient's vital signs. Patient Vitals for the past 24 hrs:   Temp Pulse Resp BP SpO2   11/18/20 1500  69 22 134/63 92 %   11/18/20 1438     93 %   11/18/20 1430  73 23 139/67 91 %   11/18/20 1415  76   93 %   11/18/20 1400  74 24 (!) 144/64 96 %   11/18/20 1330  70 24 130/60 95 %   11/18/20 1300  72 (!) 32 (!) 139/56 99 %   11/18/20 1230  (!) 130 19 123/65 99 %   11/18/20 1212 97.9 °F (36.6 °C) 69 16 (!) 118/54 97 %         Provider Notes (Medical Decision Making):   69-year-old female presenting with cough and altered mental status. Concern for hypercapnia, pneumonia, CHF, Covid, electrolyte/metabolic abnormalities. She denies any chest pain currently, however is a poor historian, differential includes ACS. ED Course:     Initial assessment performed. The patients presenting problems have been discussed, and they are in agreement with the care plan formulated and outlined with them.   I have encouraged them to ask questions as they arise throughout their visit. EKG is performed at 12: 16, shows normal sinus rhythm at a rate of 69, , QRS 80, QTc 441, upright axis, no ST segment elevation or depression concerning for ACS, is interpreted as normal sinus rhythm. CBC is negative for leukocytosis or anemia, basic metabolic panel shows CO2 greater than 45, normal renal function, VBG shows significantly elevated CO2 as well as associated acidosis. She will be initiated on BiPAP. She tolerates this well. Saturations remain good. Troponin is negative. Chest x-ray shows bilateral infiltrates concerning for pneumonia versus pulmonary edema. Covid will be tested. She will be admitted. Critical Care Time:         Disposition:  Admit    PLAN:  1. Current Discharge Medication List        2.    Follow-up Information    None       Return to ED if worse     Diagnosis     Clinical Impression: Acute hypercapnic respiratory failure

## 2020-11-18 NOTE — Clinical Note
TRANSFER - OUT REPORT:     Verbal report given to: [facility]. Report consisted of patient's Situation, Background, Assessment and   Recommendations(SBAR). Opportunity for questions and clarification was provided. Patient transported with a Registered Nurse, Monitor and Oxygen. Oxygen used for patient = nasal cannula, @ 2 - 6 Liters. Patient transported to: [address]    transported with CRNA and EP Lab staff

## 2020-11-18 NOTE — Clinical Note
TRANSFER - OUT REPORT:     Verbal report given to: IVCU staff. Report consisted of patient's Situation, Background, Assessment and   Recommendations(SBAR). Opportunity for questions and clarification was provided. Patient transported to: IVCU bed 2162.

## 2020-11-19 LAB
BASOPHILS # BLD: 0 K/UL (ref 0–0.1)
BASOPHILS NFR BLD: 0 % (ref 0–1)
D DIMER PPP FEU-MCNC: 0.97 MG/L FEU (ref 0–0.65)
DIFFERENTIAL METHOD BLD: ABNORMAL
EOSINOPHIL # BLD: 0 K/UL (ref 0–0.4)
EOSINOPHIL NFR BLD: 0 % (ref 0–7)
ERYTHROCYTE [DISTWIDTH] IN BLOOD BY AUTOMATED COUNT: 13.2 % (ref 11.5–14.5)
FIBRINOGEN PPP-MCNC: 506 MG/DL (ref 200–475)
GLUCOSE BLD STRIP.AUTO-MCNC: 124 MG/DL (ref 65–100)
GLUCOSE BLD STRIP.AUTO-MCNC: 129 MG/DL (ref 65–100)
GLUCOSE BLD STRIP.AUTO-MCNC: 131 MG/DL (ref 65–100)
GLUCOSE BLD STRIP.AUTO-MCNC: 135 MG/DL (ref 65–100)
GLUCOSE BLD STRIP.AUTO-MCNC: 146 MG/DL (ref 65–100)
HCT VFR BLD AUTO: 44.2 % (ref 35–47)
HGB BLD-MCNC: 12.8 G/DL (ref 11.5–16)
IMM GRANULOCYTES # BLD AUTO: 0 K/UL (ref 0–0.04)
IMM GRANULOCYTES NFR BLD AUTO: 0 % (ref 0–0.5)
INR PPP: 1.1 (ref 0.9–1.1)
LYMPHOCYTES # BLD: 0.5 K/UL (ref 0.8–3.5)
LYMPHOCYTES NFR BLD: 6 % (ref 12–49)
MCH RBC QN AUTO: 31.8 PG (ref 26–34)
MCHC RBC AUTO-ENTMCNC: 29 G/DL (ref 30–36.5)
MCV RBC AUTO: 109.7 FL (ref 80–99)
MONOCYTES # BLD: 0.1 K/UL (ref 0–1)
MONOCYTES NFR BLD: 1 % (ref 5–13)
NEUTS SEG # BLD: 7 K/UL (ref 1.8–8)
NEUTS SEG NFR BLD: 93 % (ref 32–75)
NRBC # BLD: 0 K/UL (ref 0–0.01)
NRBC BLD-RTO: 0 PER 100 WBC
PLATELET # BLD AUTO: 116 K/UL (ref 150–400)
PMV BLD AUTO: 13.1 FL (ref 8.9–12.9)
PROTHROMBIN TIME: 11.7 SEC (ref 9–11.1)
RBC # BLD AUTO: 4.03 M/UL (ref 3.8–5.2)
RBC MORPH BLD: ABNORMAL
RBC MORPH BLD: ABNORMAL
SERVICE CMNT-IMP: ABNORMAL
WBC # BLD AUTO: 7.6 K/UL (ref 3.6–11)

## 2020-11-19 PROCEDURE — 94640 AIRWAY INHALATION TREATMENT: CPT

## 2020-11-19 PROCEDURE — 74011250636 HC RX REV CODE- 250/636: Performed by: NURSE PRACTITIONER

## 2020-11-19 PROCEDURE — 65660000000 HC RM CCU STEPDOWN

## 2020-11-19 PROCEDURE — 74011250637 HC RX REV CODE- 250/637: Performed by: NURSE PRACTITIONER

## 2020-11-19 PROCEDURE — 94660 CPAP INITIATION&MGMT: CPT

## 2020-11-19 PROCEDURE — 74011250637 HC RX REV CODE- 250/637: Performed by: INTERNAL MEDICINE

## 2020-11-19 PROCEDURE — 85379 FIBRIN DEGRADATION QUANT: CPT

## 2020-11-19 PROCEDURE — 74011000250 HC RX REV CODE- 250: Performed by: INTERNAL MEDICINE

## 2020-11-19 PROCEDURE — 3331090002 HH PPS REVENUE DEBIT

## 2020-11-19 PROCEDURE — 82962 GLUCOSE BLOOD TEST: CPT

## 2020-11-19 PROCEDURE — 3331090001 HH PPS REVENUE CREDIT

## 2020-11-19 PROCEDURE — 74011000250 HC RX REV CODE- 250: Performed by: NURSE PRACTITIONER

## 2020-11-19 PROCEDURE — 36415 COLL VENOUS BLD VENIPUNCTURE: CPT

## 2020-11-19 PROCEDURE — 85025 COMPLETE CBC W/AUTO DIFF WBC: CPT

## 2020-11-19 PROCEDURE — 85610 PROTHROMBIN TIME: CPT

## 2020-11-19 PROCEDURE — 85384 FIBRINOGEN ACTIVITY: CPT

## 2020-11-19 PROCEDURE — 74011250636 HC RX REV CODE- 250/636: Performed by: INTERNAL MEDICINE

## 2020-11-19 RX ORDER — MICONAZOLE NITRATE 20 MG/G
CREAM TOPICAL 2 TIMES DAILY
Status: DISCONTINUED | OUTPATIENT
Start: 2020-11-19 | End: 2020-11-24 | Stop reason: HOSPADM

## 2020-11-19 RX ORDER — METOPROLOL TARTRATE 5 MG/5ML
5 INJECTION INTRAVENOUS
Status: COMPLETED | OUTPATIENT
Start: 2020-11-19 | End: 2020-11-19

## 2020-11-19 RX ORDER — DILTIAZEM HYDROCHLORIDE 60 MG/1
60 TABLET, FILM COATED ORAL
Status: DISCONTINUED | OUTPATIENT
Start: 2020-11-19 | End: 2020-11-20

## 2020-11-19 RX ADMIN — METHYLPREDNISOLONE SODIUM SUCCINATE 40 MG: 40 INJECTION, POWDER, FOR SOLUTION INTRAMUSCULAR; INTRAVENOUS at 13:35

## 2020-11-19 RX ADMIN — ENOXAPARIN SODIUM 40 MG: 40 INJECTION SUBCUTANEOUS at 08:42

## 2020-11-19 RX ADMIN — Medication 10 ML: at 22:36

## 2020-11-19 RX ADMIN — MICONAZOLE NITRATE: 20 CREAM TOPICAL at 22:35

## 2020-11-19 RX ADMIN — METHYLPREDNISOLONE SODIUM SUCCINATE 40 MG: 40 INJECTION, POWDER, FOR SOLUTION INTRAMUSCULAR; INTRAVENOUS at 17:38

## 2020-11-19 RX ADMIN — Medication 1 G: at 22:36

## 2020-11-19 RX ADMIN — METHYLPREDNISOLONE SODIUM SUCCINATE 40 MG: 40 INJECTION, POWDER, FOR SOLUTION INTRAMUSCULAR; INTRAVENOUS at 05:35

## 2020-11-19 RX ADMIN — Medication 1 G: at 17:41

## 2020-11-19 RX ADMIN — SODIUM CHLORIDE 250 ML: 900 INJECTION, SOLUTION INTRAVENOUS at 23:22

## 2020-11-19 RX ADMIN — Medication 1 G: at 08:43

## 2020-11-19 RX ADMIN — AMIODARONE HYDROCHLORIDE 150 MG: 1.5 INJECTION, SOLUTION INTRAVENOUS at 23:40

## 2020-11-19 RX ADMIN — IPRATROPIUM BROMIDE AND ALBUTEROL SULFATE 3 ML: .5; 3 SOLUTION RESPIRATORY (INHALATION) at 08:17

## 2020-11-19 RX ADMIN — Medication 10 ML: at 05:46

## 2020-11-19 RX ADMIN — LEVOFLOXACIN 750 MG: 5 INJECTION, SOLUTION INTRAVENOUS at 18:53

## 2020-11-19 RX ADMIN — IPRATROPIUM BROMIDE AND ALBUTEROL SULFATE 3 ML: .5; 3 SOLUTION RESPIRATORY (INHALATION) at 01:53

## 2020-11-19 RX ADMIN — METOPROLOL TARTRATE 5 MG: 5 INJECTION INTRAVENOUS at 20:37

## 2020-11-19 RX ADMIN — METHYLPREDNISOLONE SODIUM SUCCINATE 40 MG: 40 INJECTION, POWDER, FOR SOLUTION INTRAMUSCULAR; INTRAVENOUS at 23:24

## 2020-11-19 RX ADMIN — METOPROLOL TARTRATE 5 MG: 5 INJECTION INTRAVENOUS at 20:29

## 2020-11-19 RX ADMIN — DILTIAZEM HYDROCHLORIDE 60 MG: 60 TABLET, FILM COATED ORAL at 22:34

## 2020-11-19 RX ADMIN — ENOXAPARIN SODIUM 40 MG: 40 INJECTION SUBCUTANEOUS at 20:39

## 2020-11-19 NOTE — INTERDISCIPLINARY ROUNDS
Interdisciplinary team rounds were held 11/19/2020 with the following team members:Care Management, Nursing, Pharmacy, Physician and Clinical Coordinator. Plan of care discussed. See clinical pathway and/or care plan for interventions and desired outcomes. Patient off BiPAP this AM and on 4L NC. Nursing to continue to wean O2 to patients baseline of 3L as tolerated. Covid 19 results pending. PT/OT consult for discharge disposition.

## 2020-11-19 NOTE — ED NOTES
TRANSFER - OUT REPORT:    Verbal report given to Ewa Rodriguez RN(name) on Mis Hidalgo  being transferred to 2249 PCU(unit) for routine progression of care       Report consisted of patients Situation, Background, Assessment and   Recommendations(SBAR). Information from the following report(s) ED Summary was reviewed with the receiving nurse. Opportunity for questions and clarification was provided.       Patient transported with:   Monitor  O2 @ 15 liters  Registered Nurse and RT

## 2020-11-19 NOTE — ED NOTES
Bedside and Verbal shift change report given to Shravan Hurd (oncoming nurse) by Gage Fried (offgoing nurse). Report included the following information SBAR, ED Summary and MAR.

## 2020-11-19 NOTE — WOUND CARE
Wound care Nurse Consult: consult for POA \"stage II pressure injury\". Patient is a 64 y/o CF admitted for acute respratory failure. Isolation for Covid 19 r/o. Past Medical History:  
Diagnosis Date  Atrial fibrillation ([facility] [address] 11/4/2020  CHF (congestive heart failure) ([facility] [address]  Diabetes ([facility] [address]  Hypertension  Ill-defined condition   
 high cholesterol  Ill-defined condition   
 tachycardia  Other and unspecified symptoms and signs involving general sensations and perceptions   
 ruptured disc  S/P cardiac cath 11/4/2020  
 11/3/2020 nonobstructive disease Patient is 5'3\", 307 lbs. Incontinent of urine - Purewick in place. Patient has a Unstageable PI to left buttock POA. It is small, aprox 1.5 x 1 cm's, 100 % wet eschar surrounded by blanchable erythema and some denuded skin. Skin folds have yeast type rash also. Recommend[de-identified] 
 
Left buttock wound and kimmy-wound skin: cleanse gently with soap and water, pat skin dry and apply Secura anti-fungal zinc cream to skin and wound.  
 
Jaylene Orozco RN, [employer]

## 2020-11-19 NOTE — PROGRESS NOTES
0800: assumed care of patient. Patient resting in bed comfortably. No IV access. 1015: PICC team called.

## 2020-11-19 NOTE — CONSULTS
[facility]  Pulmonary, Critical Care, and Sleep Medicine    Initial Patient Consult    Name: Rachel Hernandez MRN: [id number]   : 1960 Hospital: [facility]   Date: 2020           IMPRESSION:   · Abnormal CXR: may be pulmonary edema or atypical pneumonia. · Acute on chronic hypoxemic (3-4 l at baseline), hypercapnic respiratory failure - better after treatment with bipap. ABG on 20: 7.21/>90/40. on bipap last pm: 7.23/>90/74/50 on bipap. · Rapid Covid test was negative in ER. · Encephalopathy (for several days prior to admission. Now better. · Suspected COPD exacerbation. History of tobacco abuse, possible COPD but unknown FEV1,   · Probable JOSLYN/OHV   · CHF exacerbation   · Afib -paroxysmal. Not anticoagulated to to medical compliance issues. · Morbid obesity   · Noncompliance   · Hyperglycemia due to steroid use. · Debility -severe DJD of knees. · Daughter: Michael Christianson: [address]; [phone]. · Full Code.        RECOMMENDATIONS:   · Will check BNP and Procalcitonin in am.   · Bipap whenever she is asleep at night and during day as needed. · Can follow ABGs as needed and will recheck in am.   · Can try and get bedside PFTs prior to discharge. · Agree with diuresis, on diamox for now. · Afib per cards Dilt for rate control. · Duonebs  · Glycemic control. · On empiric levofloxacin  · ON Enoxaparin  · Will follow along with your for now. · We can see her in outpatient follow up via telemedicine since she can't come and see us in person. We can arrange to see her in pulm and sleep clinics after d/c. Subjective:     Seen about 8am.    This patient has been seen and evaluated at the request of Dr. Roark Klinefelter for above. Patient is a 63 y.o. female who presented to ER for evaluation of worsening dyspnea. She is well know to myself. When seen this am. She was off the bipap. Seems to be conversant and comfortable.  Had been discharged about 2 weeks ago with similar complaints. Denies chest pain, no back pain, no leg pain. NO headaches. Not able to get additional hx from pt. Does report ongoing right knee pain. Per EHR: 11/18: admitted through ER. Family called EMS due to pt have respiratory difficulty. Pt was on RA with pox of 80%. She was placed on 10L NRB and transported to ER. Was also complaining or right knee pain. Has really severe knee pain per her report at baseline. Past Medical History:   Diagnosis Date    Atrial fibrillation ([facility] [address] 11/4/2020    CHF (congestive heart failure) (HCC)     Diabetes ([facility] [address]     Hypertension     Ill-defined condition     high cholesterol    Ill-defined condition     tachycardia    Other and unspecified symptoms and signs involving general sensations and perceptions     ruptured disc    S/P cardiac cath 11/4/2020    11/3/2020 nonobstructive disease      No past surgical history on file. Prior to Admission medications    Medication Sig Start Date End Date Taking? Authorizing Provider   menthol-zinc oxide (Moisture Barrier Ointment) 0.44-20.6 % oint Apply 1 Each to affected area three (3) times daily. thin layer moisture associated lesion    Provider, Historical   aspirin 81 mg chewable tablet Take 1 Tab by mouth daily. 11/5/20   Edwin Rojas MD   dilTIAZem ER (CARDIZEM CD) 120 mg capsule Take 1 Cap by mouth daily. 11/5/20   Edwin Rojas MD   OXYGEN-AIR DELIVERY SYSTEMS 3 L/min by Nasal route continuous. 8/31/20   Hussain Bingham, NP   bismuth subsalicylate (PEPTO-BISMOL MAXIMUM STRENGTH) 525 mg/15 mL susp Take 30 mL by mouth daily as needed for Diarrhea or Nausea. Provider, Historical   acetaminophen (TYLENOL) 325 mg tablet Take 650 mg by mouth every six (6) hours as needed for Pain. Provider, Historical   furosemide (LASIX) 40 mg tablet Take 1 Tab by mouth daily. 8/7/20   Lacie Hernandez MD   atorvastatin (LIPITOR) 10 mg tablet Take 10 mg by mouth nightly. Russ Cano MD   metoprolol (LOPRESSOR) 25 mg tablet Take 25 mg by mouth two (2) times a day. Russ Cano MD     Allergies   Allergen Reactions    Cefazolin Rash    Other Plant, Animal, Environmental Rash     Ivory soap caused rash on hands. Social History     Tobacco Use    Smoking status: Former Smoker    Smokeless tobacco: Never Used   Substance Use Topics    Alcohol use: No      No family history on file. Current Facility-Administered Medications   Medication Dose Route Frequency    [Held by provider] aspirin chewable tablet 81 mg  81 mg Oral DAILY    [Held by provider] atorvastatin (LIPITOR) tablet 10 mg  10 mg Oral QHS    [Held by provider] dilTIAZem ER (CARDIZEM CD) capsule 120 mg  120 mg Oral DAILY    [Held by provider] furosemide (LASIX) tablet 40 mg  40 mg Oral DAILY    zinc oxide-cod liver oil (DESITIN) 40 % paste 1 g  1 g Topical TID    [Held by provider] metoprolol tartrate (LOPRESSOR) tablet 25 mg  25 mg Oral BID    sodium chloride (NS) flush 5-40 mL  5-40 mL IntraVENous Q8H    enoxaparin (LOVENOX) injection 40 mg  40 mg SubCUTAneous Q12H    methylPREDNISolone (PF) (SOLU-MEDROL) injection 40 mg  40 mg IntraVENous Q6H    [Held by provider] furosemide (LASIX) injection 40 mg  40 mg IntraVENous DAILY    levoFLOXacin (LEVAQUIN) 750 mg in D5W IVPB  750 mg IntraVENous Q24H    acetaZOLAMIDE (DIAMOX) 500 mg in sterile water (preservative free) 5 mL injection  500 mg IntraVENous Q24H    insulin lispro (HUMALOG) injection   SubCUTAneous Q6H    albuterol-ipratropium (DUO-NEB) 2.5 MG-0.5 MG/3 ML  3 mL Nebulization Q6H RT       Review of Systems:  A comprehensive review of systems was negative. Except as noted above in the HPI.      Objective:   Vital Signs:    Visit Vitals  BP (!) 96/37   Pulse 95   Temp 99 °F (37.2 °C)   Resp (!) 35   Ht 5' 3\" (1.6 m)   Wt 139.4 kg (307 lb 5.1 oz)   SpO2 91%   BMI 54.44 kg/m²       O2 Device: Nasal cannula   O2 Flow Rate (L/min): 4 l/min   Temp (24hrs), Av °F (37.2 °C), Min:98.7 °F (37.1 °C), Max:99.3 °F (37.4 °C)       Intake/Output:   Last shift:      701 - 1900  In: -   Out: 400 [Urine:400]  Last 3 shifts: 1901 - 700  In: -   Out: 1300 [Urine:1300]    Intake/Output Summary (Last 24 hours) at 2020 1430  Last data filed at 2020 1145  Gross per 24 hour   Intake    Output 1700 ml   Net -1700 ml      Physical Exam:   General:  Alert, cooperative, no distress, appears stated age. Seems unkempt. Dirty fingernails. Head:  Normocephalic, without obvious abnormality, atraumatic. Eyes:  Conjunctivae/corneas clear. PERRL, EOMs intact. Nose: Nares normal. Septum midline. Mucosa normal. No drainage or sinus tenderness. Throat: Lips, mucosa, and tongue normal. Teeth and gums normal.   Neck: Supple, symmetrical, trachea midline, no adenopathy, thyroid: no enlargment/tenderness/nodules, no carotid bruit and no JVD. Back:   Symmetric, no curvature. ROM normal.   Lungs:   Clear to auscultation bilaterally. Chest wall:  No tenderness or deformity. Heart:  Regular rate and rhythm, S1, S2 normal, no murmur, click, rub or gallop. Abdomen:   Soft, non-tender. Bowel sounds normal. No masses,  No organomegaly. Extremities: Extremities normal, atraumatic, no cyanosis. Mild redness on dorsum of right foot. Pulses: 2+ and symmetric all extremities. Skin: Skin color, texture, turgor normal. No rashes or lesions   Lymph nodes: Cervical, supraclavicular, and axillary nodes normal.   Neurologic: Grossly nonfocal, motor seems intact.       Data review:     Recent Results (from the past 24 hour(s))   SARS-COV-2    Collection Time: 20  3:04 PM   Result Value Ref Range    Specimen source Nasopharyngeal      Specimen source Nasopharyngeal      COVID-19 rapid test Not detected NOTD      Specimen type NP Swab      Health status Symptomatic Testing     SARS-COV-2    Collection Time: 20  4:28 PM   Result Value Ref Range    Specimen source Nasopharyngeal      SARS-CoV-2 PENDING     SARS-CoV-2 PENDING     Specimen source Nasopharyngeal      COVID-19 rapid test PENDING     Specimen type NP Swab      Health status PENDING     COVID-19 PENDING    POC EG7    Collection Time: 11/18/20  4:39 PM   Result Value Ref Range    Calcium, ionized (POC) 1.31 1.12 - 1.32 mmol/L    FIO2 (POC) 50 %    pH (POC) 7.23 (LL) 7.35 - 7.45      pCO2 (POC) >90.0 (H) 35.0 - 45.0 MMHG    pO2 (POC) 74 (L) 80 - 100 MMHG    Site RIGHT RADIAL      Device: BIPAP      PEEP/CPAP (POC) 6 cmH2O    PIP (POC) 20      Allens test (POC) YES      Specimen type (POC) ARTERIAL      Total resp.  rate 21     GLUCOSE, POC    Collection Time: 11/18/20  7:04 PM   Result Value Ref Range    Glucose (POC) 146 (H) 65 - 100 mg/dL    Performed by Cristiana Santiago RN ([facility])    CULTURE, BLOOD    Collection Time: 11/18/20  7:48 PM    Specimen: Blood   Result Value Ref Range    Special Requests: NO SPECIAL REQUESTS      Culture result: NO GROWTH AFTER 11 HOURS     CULTURE, BLOOD    Collection Time: 11/18/20  7:59 PM    Specimen: Blood   Result Value Ref Range    Special Requests: NO SPECIAL REQUESTS      Culture result: NO GROWTH AFTER 11 HOURS     INFLUENZA A+B VIRAL AGS    Collection Time: 11/18/20  8:37 PM   Result Value Ref Range    Influenza A Antigen Negative NEG      Influenza B Antigen Negative NEG     RESPIRATORY PANEL,PCR,NASOPHARYNGEAL    Collection Time: 11/18/20  8:38 PM    Specimen: Nasopharyngeal   Result Value Ref Range    Adenovirus Not detected NOTD      Coronavirus 229E Not detected NOTD      Coronavirus HKU1 Not detected NOTD      Coronavirus CVNL63 Not detected NOTD      Coronavirus OC43 Not detected NOTD      Metapneumovirus Not detected NOTD      Rhinovirus and Enterovirus Not detected NOTD      Influenza A Not detected NOTD      Influenza A, subtype H1 Not detected NOTD      Influenza A, subtype H3 Not detected NOTD      INFLUENZA A H1N1 PCR Not detected NOTD Influenza B Not detected NOTD      Parainfluenza 1 Not detected NOTD      Parainfluenza 2 Not detected NOTD      Parainfluenza 3 Not detected NOTD      Parainfluenza virus 4 Not detected NOTD      RSV by PCR Not detected NOTD      B. parapertussis, PCR Not detected NOTD      Bordetella pertussis - PCR Not detected NOTD      Chlamydophila pneumoniae DNA, QL, PCR Not detected NOTD      Mycoplasma pneumoniae DNA, QL, PCR Not detected NOTD     GLUCOSE, POC    Collection Time: 11/18/20 11:07 PM   Result Value Ref Range    Glucose (POC) 107 (H) 65 - 100 mg/dL    Performed by Deborha Cabot RN    PROTHROMBIN TIME + INR    Collection Time: 11/19/20  1:07 AM   Result Value Ref Range    INR 1.1 0.9 - 1.1      Prothrombin time 11.7 (H) 9.0 - 11.1 sec   D DIMER    Collection Time: 11/19/20  1:07 AM   Result Value Ref Range    D-dimer 0.97 (H) 0.00 - 0.65 mg/L FEU   CBC WITH AUTOMATED DIFF    Collection Time: 11/19/20  1:07 AM   Result Value Ref Range    WBC 7.6 3.6 - 11.0 K/uL    RBC 4.03 3.80 - 5.20 M/uL    HGB 12.8 11.5 - 16.0 g/dL    HCT 44.2 35.0 - 47.0 %    .7 (H) 80.0 - 99.0 FL    MCH 31.8 26.0 - 34.0 PG    MCHC 29.0 (L) 30.0 - 36.5 g/dL    RDW 13.2 11.5 - 14.5 %    PLATELET 969 (L) 003 - 400 K/uL    MPV 13.1 (H) 8.9 - 12.9 FL    NRBC 0.0 0  WBC    ABSOLUTE NRBC 0.00 0.00 - 0.01 K/uL    NEUTROPHILS 93 (H) 32 - 75 %    LYMPHOCYTES 6 (L) 12 - 49 %    MONOCYTES 1 (L) 5 - 13 %    EOSINOPHILS 0 0 - 7 %    BASOPHILS 0 0 - 1 %    IMMATURE GRANULOCYTES 0 0.0 - 0.5 %    ABS. NEUTROPHILS 7.0 1.8 - 8.0 K/UL    ABS. LYMPHOCYTES 0.5 (L) 0.8 - 3.5 K/UL    ABS. MONOCYTES 0.1 0.0 - 1.0 K/UL    ABS. EOSINOPHILS 0.0 0.0 - 0.4 K/UL    ABS. BASOPHILS 0.0 0.0 - 0.1 K/UL    ABS. IMM.  GRANS. 0.0 0.00 - 0.04 K/UL    DF SMEAR SCANNED      RBC COMMENTS MACROCYTOSIS  1+        RBC COMMENTS STOMATOCYTES  1+       FIBRINOGEN    Collection Time: 11/19/20  1:07 AM   Result Value Ref Range    Fibrinogen 506 (H) 200 - 475 mg/dL   GLUCOSE, POC    Collection Time: 11/19/20  7:05 AM   Result Value Ref Range    Glucose (POC) 129 (H) 65 - 100 mg/dL    Performed by Reji Monique RN    GLUCOSE, POC    Collection Time: 11/19/20 12:53 PM   Result Value Ref Range    Glucose (POC) 135 (H) 65 - 100 mg/dL    Performed by Damian Moe        Imaging:  I have personally reviewed the patients radiographs and have reviewed the reports:  11-18-20: CXR: IMPRESSION: There is increasing interstitial and vague airspace opacities in the  lungs may represent edema versus atypical pneumonia. .. Cardiomegaly appears  stable allowing for differences in technique. Roman Stein MD

## 2020-11-19 NOTE — PROGRESS NOTES
TRANSFER - IN REPORT:    Verbal report received from Frances Burton RN(name) on [facility]  being received from ED(unit) for routine progression of care      Report consisted of patients Situation, Background, Assessment and   Recommendations(SBAR). Information from the following report(s) SBAR, Kardex and Cardiac Rhythm NSR was reviewed with the receiving nurse. Opportunity for questions and clarification was provided. Assessment completed upon patients arrival to unit and care assumed.

## 2020-11-19 NOTE — PROGRESS NOTES
0630: Lab called to report that pt's sample for BMP hemolyzed again for the third time.  Pt is a difficult stick Will refer to PICC team.     0730: Report given to Bernard Ely

## 2020-11-19 NOTE — PROGRESS NOTES
Reason for Readmission:   Patient came in for sob and she was more altered than usual per family. She is a readmit, was here November 2nd for ams and respiratory failure. RUR Score/Risk Level:  22%        PCP: First and Last name:  Katelynn Flores     Name of Practice: [facility] [place]     Are you a current patient: Yes/No: Yes     Approximate date of last visit: Nov 5,2020     Can you participate in a virtual visit with your PCP:  Yes with help from daughter. Is a Care Conference indicated: Discussed with md.      Did you attend your follow up appointment (s): If not, why not: Patient had virtual visit on Nov 5,2020           Resources/supports as identified by patient/family:   Patient has supportive daughter. Top Challenges facing patient (as identified by patient/family and CM): Finances/Medication cost?  Patient does not have problems obtaining medication. Transportation   Per daughter states patient has not been going out to appointments because she is unable to ambulate. Informed daughter patient is showing she has medicaid as secondary and she will follow up with medicaid to see if she has transportation benefit in her policy. She has the number. Daughter did not know patient had medicaid. Support system or lack thereof? She has supportive daughter. Living arrangements? She lives with her daughter. Self-care/ADLs/Cognition? Patient is able to feed herself. The daughter takes care of her and does her adl's. Per daughter she does not work. Patient wears oxygen at home 3 liter continuously per daughter. The oxygen company is [employer]. Current Advanced Directive/Advance Care Plan:  Patient does not have ACP. Patient has altered mental status at this time.                Plan for utilizing home health:  She has used [facility] in the past.               Transition of Care Plan:    Based on readmission, the patient's previous Plan of Care   has been evaluated and/or modified. The current Transition of Care Plan is:  Patient will be followed by pt/ot consults and daughter is agreeable to services if needed. Amira Thao RN BSN CRM        [phone]

## 2020-11-19 NOTE — ED NOTES
Pt received resting on stretcher and has tremors and repeatedly stating she is sorry and that she is not usually like this. . Pt reassured that she has no need to be sorry about anything. Antibiotics infusing.

## 2020-11-19 NOTE — PROGRESS NOTES
Hospitalist Progress Note    NAME: Harvey Howard   :  1960   MRN:  [id number]     Interim Hospital Summary: 63 y.o. female whom presented on 2020 with      Assessment / Plan:    Acute on chronic hypoxic and hypercapnic respiratory failure  Acute metabolic encephalopathy, resolved  Suspect COPD exacerbation (unknown FEV1)  Hx Tobacco abuse  -CXR There is increasing interstitial and vague airspace opacities in the lungs may represent edema versus atypical pneumonia. .. Cardiomegaly appears stable allowing for differences in technique  -SARS COV2 rapid NEG, PCR pending  -baseline 3-4L oxgyen  -mental status improved with BIPAP support - use at HS prn  -continue antibiotics. PCT 0.06  -continue IV steroids  -diamox prn  -appreciate pulmonary input. Try get bedside PFTs prior to discharge. Ideally should get outpatient sleep study but patient does not walk or get out of there house. PAF  -not on anticoagulation as an outpatient due to compliance issues, cont ASA  -continue holding cardizem and metoprolol due to soft BP. Re eval in AM    DM2  -SSI prn    40 or above Morbid obesity / Body mass index is 54.44 kg/m². Code status: Full  Prophylaxis: Lovenox  Recommended Disposition: Home w/Family       Subjective:     Chief Complaint / Reason for Physician Visit  Follow up of AMS, COPD, OHS, Afib and encephalopathy  Chart reviewed in detail. Discussed with RN events overnight. Off BIPAP since this AM.   Alert    Review of Systems:  Symptom Y/N Comments  Symptom Y/N Comments   Fever/Chills    Chest Pain     Poor Appetite    Edema     Cough    Abdominal Pain     Sputum    Joint Pain     SOB/CHOPRA   improved  Pruritis/Rash     Nausea/vomit    Tolerating PT/OT     Diarrhea    Tolerating Diet     Constipation    Other       Could NOT obtain due to:      PO intake: No data found.   Objective:     VITALS:   Last 24hrs VS reviewed since prior progress note. Most recent are:  Patient Vitals for the past 24 hrs:   Temp Pulse Resp BP SpO2   11/19/20 1400  95 (!) 35  91 %   11/19/20 1300  88 (!) 32  91 %   11/19/20 1200  90 (!) 34 (!) 96/37 93 %   11/19/20 1100  88 27  92 %   11/19/20 1000  84 28  93 %   11/19/20 0900  92 21  93 %   11/19/20 0817     100 %   11/19/20 0800 99 °F (37.2 °C) 98 (!) 32 (!) 135/50 96 %   11/19/20 0400 98.7 °F (37.1 °C) 84 27 (!) 124/40 98 %   11/19/20 0313     96 %   11/19/20 0154     95 %   11/19/20 0100 99.3 °F (37.4 °C) 85 28 (!) 121/100 96 %   11/19/20 0000  89 23  (!) 87 %   11/18/20 2310     95 %   11/18/20 2114 99.1 °F (37.3 °C) 80 18 (!) 139/53 99 %   11/18/20 2019     98 %   11/18/20 2000  81 21 (!) 133/109 98 %   11/18/20 1900  77 22 (!) 121/91    11/18/20 1830  70 20 (!) 115/58 100 %   11/18/20 1800  70 19 122/60 99 %   11/18/20 1700  71 19 (!) 114/56 99 %   11/18/20 1630  67 30 (!) 151/73 90 %   11/18/20 1600  70 21 137/63 95 %   11/18/20 1530  71 23 (!) 148/76 96 %   11/18/20 1500  69 22 134/63 92 %       Intake/Output Summary (Last 24 hours) at 11/19/2020 1451  Last data filed at 11/19/2020 1145  Gross per 24 hour   Intake    Output 1700 ml   Net -1700 ml        I had a face to face encounter, and independently examined this patient on 11/19/2020, as outlined below:  PHYSICAL EXAM:  General: WD, WN. Alert, cooperative, no acute distress    EENT:  EOMI. Anicteric sclerae. MMM  Resp:  Diminished breath sounds. No accessory muscle use  CV:  Regular  rhythm,  No edema  GI:  Soft, Non distended, Non tender. +Bowel sounds  Neurologic:  Alert and oriented X 3, normal speech,   Psych:   Good insight. Not anxious nor agitated  Skin:  No rashes.   No jaundice    Reviewed most current lab test results and cultures  YES  Reviewed most current radiology test results   YES  Review and summation of old records today    NO  Reviewed patient's current orders and MAR    YES  PMH/SH reviewed - no change compared to H&P  ________________________________________________________________________  Care Plan discussed with:    Comments   Patient x    Family      RN x    Care Manager     Consultant                        Multidiciplinary team rounds were held today with , nursing, pharmacist and clinical coordinator. Patient's plan of care was discussed; medications were reviewed and discharge planning was addressed. ________________________________________________________________________  Total NON critical care TIME:  35   Minutes    Total CRITICAL CARE TIME Spent:   Minutes non procedure based      Comments   >50% of visit spent in counseling and coordination of care x     This includes time during multidisciplinary rounds if indicated above   ________________________________________________________________________  Jackson Celaya MD     Procedures: see electronic medical records for all procedures/Xrays and details which were not copied into this note but were reviewed prior to creation of Plan. LABS:  I reviewed today's most current labs and imaging studies.   Pertinent labs include:  Recent Labs     11/19/20 0107 11/18/20  1213   WBC 7.6 8.4   HGB 12.8 13.9   HCT 44.2 49.3*   * 157     Recent Labs     11/19/20 0107 11/18/20  1236 11/18/20  1213   NA  --   --  141   K  --   --  4.8   CL  --   --  89*   CO2  --   --  >45*   GLU  --   --  140*   BUN  --   --  22*   CREA  --   --  0.57   CA  --   --  10.0   MG  --  2.4  --    ALB  --   --  2.9*   TBILI  --   --  0.5   ALT  --   --  40   INR 1.1 1.0  --

## 2020-11-20 ENCOUNTER — APPOINTMENT (OUTPATIENT)
Dept: GENERAL RADIOLOGY | Age: 60
DRG: 273 | End: 2020-11-20
Attending: INTERNAL MEDICINE
Payer: MEDICARE

## 2020-11-20 ENCOUNTER — HOME CARE VISIT (OUTPATIENT)
Dept: HOME HEALTH SERVICES | Facility: HOME HEALTH | Age: 60
End: 2020-11-20
Payer: MEDICARE

## 2020-11-20 PROBLEM — I48.91 ATRIAL FIBRILLATION (HCC): Chronic | Status: ACTIVE | Noted: 2020-11-04

## 2020-11-20 PROBLEM — J96.21 ACUTE ON CHRONIC RESPIRATORY FAILURE WITH HYPOXIA AND HYPERCAPNIA (HCC): Status: ACTIVE | Noted: 2020-11-20

## 2020-11-20 PROBLEM — I48.3 TYPICAL ATRIAL FLUTTER (HCC): Status: ACTIVE | Noted: 2020-11-20

## 2020-11-20 PROBLEM — J96.22 ACUTE ON CHRONIC RESPIRATORY FAILURE WITH HYPOXIA AND HYPERCAPNIA (HCC): Status: ACTIVE | Noted: 2020-11-20

## 2020-11-20 PROBLEM — I48.92 ATRIAL FLUTTER WITH RAPID VENTRICULAR RESPONSE (HCC): Status: ACTIVE | Noted: 2020-11-20

## 2020-11-20 PROBLEM — E78.2 MIXED HYPERLIPIDEMIA: Chronic | Status: ACTIVE | Noted: 2020-11-15

## 2020-11-20 PROBLEM — I48.91 ATRIAL FIBRILLATION (HCC): Chronic | Status: RESOLVED | Noted: 2020-11-04 | Resolved: 2020-11-20

## 2020-11-20 PROBLEM — G47.33 OSA (OBSTRUCTIVE SLEEP APNEA): Status: ACTIVE | Noted: 2020-11-20

## 2020-11-20 LAB
ALBUMIN SERPL-MCNC: 2.6 G/DL (ref 3.5–5)
ALBUMIN/GLOB SERPL: 0.5 {RATIO} (ref 1.1–2.2)
ALP SERPL-CCNC: 121 U/L (ref 45–117)
ALT SERPL-CCNC: 33 U/L (ref 12–78)
ANION GAP SERPL CALC-SCNC: 2 MMOL/L (ref 5–15)
AST SERPL-CCNC: 54 U/L (ref 15–37)
BASOPHILS # BLD: 0 K/UL (ref 0–0.1)
BASOPHILS NFR BLD: 0 % (ref 0–1)
BILIRUB SERPL-MCNC: 0.6 MG/DL (ref 0.2–1)
BNP SERPL-MCNC: 586 PG/ML
BUN SERPL-MCNC: 30 MG/DL (ref 6–20)
BUN/CREAT SERPL: 44 (ref 12–20)
CALCIUM SERPL-MCNC: 9.8 MG/DL (ref 8.5–10.1)
CHLORIDE SERPL-SCNC: 94 MMOL/L (ref 97–108)
CO2 SERPL-SCNC: 40 MMOL/L (ref 21–32)
COMMENT, HOLDF: NORMAL
CREAT SERPL-MCNC: 0.68 MG/DL (ref 0.55–1.02)
D DIMER PPP FEU-MCNC: 0.66 MG/L FEU (ref 0–0.65)
DIFFERENTIAL METHOD BLD: ABNORMAL
EOSINOPHIL # BLD: 0 K/UL (ref 0–0.4)
EOSINOPHIL NFR BLD: 0 % (ref 0–7)
ERYTHROCYTE [DISTWIDTH] IN BLOOD BY AUTOMATED COUNT: 13.4 % (ref 11.5–14.5)
FIBRINOGEN PPP-MCNC: 429 MG/DL (ref 200–475)
GLOBULIN SER CALC-MCNC: 5.4 G/DL (ref 2–4)
GLUCOSE BLD STRIP.AUTO-MCNC: 173 MG/DL (ref 65–100)
GLUCOSE BLD STRIP.AUTO-MCNC: 177 MG/DL (ref 65–100)
GLUCOSE BLD STRIP.AUTO-MCNC: 177 MG/DL (ref 65–100)
GLUCOSE BLD STRIP.AUTO-MCNC: 212 MG/DL (ref 65–100)
GLUCOSE BLD STRIP.AUTO-MCNC: 222 MG/DL (ref 65–100)
GLUCOSE SERPL-MCNC: 136 MG/DL (ref 65–100)
HCT VFR BLD AUTO: 41.2 % (ref 35–47)
HEALTH STATUS, XMCV2T: NORMAL
HGB BLD-MCNC: 12.8 G/DL (ref 11.5–16)
IMM GRANULOCYTES # BLD AUTO: 0 K/UL (ref 0–0.04)
IMM GRANULOCYTES NFR BLD AUTO: 0 % (ref 0–0.5)
INR PPP: 1.2 (ref 0.9–1.1)
LYMPHOCYTES # BLD: 0.5 K/UL (ref 0.8–3.5)
LYMPHOCYTES NFR BLD: 8 % (ref 12–49)
MAGNESIUM SERPL-MCNC: 2.2 MG/DL (ref 1.6–2.4)
MCH RBC QN AUTO: 31.6 PG (ref 26–34)
MCHC RBC AUTO-ENTMCNC: 31.1 G/DL (ref 30–36.5)
MCV RBC AUTO: 101.7 FL (ref 80–99)
MONOCYTES # BLD: 0.3 K/UL (ref 0–1)
MONOCYTES NFR BLD: 4 % (ref 5–13)
NEUTS SEG # BLD: 5.7 K/UL (ref 1.8–8)
NEUTS SEG NFR BLD: 88 % (ref 32–75)
NRBC # BLD: 0 K/UL (ref 0–0.01)
NRBC BLD-RTO: 0 PER 100 WBC
PHOSPHATE SERPL-MCNC: 2.6 MG/DL (ref 2.6–4.7)
PLATELET # BLD AUTO: 130 K/UL (ref 150–400)
PLATELET COMMENTS,PCOM: ABNORMAL
PMV BLD AUTO: 12.5 FL (ref 8.9–12.9)
POTASSIUM SERPL-SCNC: 3.8 MMOL/L (ref 3.5–5.1)
PROCALCITONIN SERPL-MCNC: 0.05 NG/ML
PROT SERPL-MCNC: 8 G/DL (ref 6.4–8.2)
PROTHROMBIN TIME: 12.1 SEC (ref 9–11.1)
RBC # BLD AUTO: 4.05 M/UL (ref 3.8–5.2)
RBC MORPH BLD: ABNORMAL
SAMPLES BEING HELD,HOLD: NORMAL
SARS-COV-2, COV2: NOT DETECTED
SERVICE CMNT-IMP: ABNORMAL
SODIUM SERPL-SCNC: 136 MMOL/L (ref 136–145)
SOURCE, COVRS: NORMAL
SPECIMEN SOURCE, FCOV2M: NORMAL
SPECIMEN TYPE, XMCV1T: NORMAL
WBC # BLD AUTO: 6.5 K/UL (ref 3.6–11)

## 2020-11-20 PROCEDURE — 74011000250 HC RX REV CODE- 250: Performed by: INTERNAL MEDICINE

## 2020-11-20 PROCEDURE — 74011250636 HC RX REV CODE- 250/636: Performed by: NURSE PRACTITIONER

## 2020-11-20 PROCEDURE — 3331090002 HH PPS REVENUE DEBIT

## 2020-11-20 PROCEDURE — 82962 GLUCOSE BLOOD TEST: CPT

## 2020-11-20 PROCEDURE — 85379 FIBRIN DEGRADATION QUANT: CPT

## 2020-11-20 PROCEDURE — 85025 COMPLETE CBC W/AUTO DIFF WBC: CPT

## 2020-11-20 PROCEDURE — 74011000250 HC RX REV CODE- 250: Performed by: NURSE PRACTITIONER

## 2020-11-20 PROCEDURE — 74011250636 HC RX REV CODE- 250/636: Performed by: INTERNAL MEDICINE

## 2020-11-20 PROCEDURE — 83735 ASSAY OF MAGNESIUM: CPT

## 2020-11-20 PROCEDURE — 84100 ASSAY OF PHOSPHORUS: CPT

## 2020-11-20 PROCEDURE — 74011636637 HC RX REV CODE- 636/637: Performed by: INTERNAL MEDICINE

## 2020-11-20 PROCEDURE — 80053 COMPREHEN METABOLIC PANEL: CPT

## 2020-11-20 PROCEDURE — 71045 X-RAY EXAM CHEST 1 VIEW: CPT

## 2020-11-20 PROCEDURE — 3331090001 HH PPS REVENUE CREDIT

## 2020-11-20 PROCEDURE — 85384 FIBRINOGEN ACTIVITY: CPT

## 2020-11-20 PROCEDURE — 99223 1ST HOSP IP/OBS HIGH 75: CPT | Performed by: INTERNAL MEDICINE

## 2020-11-20 PROCEDURE — 74011250637 HC RX REV CODE- 250/637: Performed by: INTERNAL MEDICINE

## 2020-11-20 PROCEDURE — 85610 PROTHROMBIN TIME: CPT

## 2020-11-20 PROCEDURE — 83880 ASSAY OF NATRIURETIC PEPTIDE: CPT

## 2020-11-20 PROCEDURE — 74011000258 HC RX REV CODE- 258: Performed by: NURSE PRACTITIONER

## 2020-11-20 PROCEDURE — 84145 PROCALCITONIN (PCT): CPT

## 2020-11-20 PROCEDURE — 36415 COLL VENOUS BLD VENIPUNCTURE: CPT

## 2020-11-20 PROCEDURE — 94640 AIRWAY INHALATION TREATMENT: CPT

## 2020-11-20 PROCEDURE — 94660 CPAP INITIATION&MGMT: CPT

## 2020-11-20 PROCEDURE — 77010033678 HC OXYGEN DAILY

## 2020-11-20 PROCEDURE — 65660000000 HC RM CCU STEPDOWN

## 2020-11-20 RX ORDER — INSULIN LISPRO 100 [IU]/ML
INJECTION, SOLUTION INTRAVENOUS; SUBCUTANEOUS
Status: DISCONTINUED | OUTPATIENT
Start: 2020-11-20 | End: 2020-11-24 | Stop reason: HOSPADM

## 2020-11-20 RX ORDER — IPRATROPIUM BROMIDE AND ALBUTEROL SULFATE 2.5; .5 MG/3ML; MG/3ML
3 SOLUTION RESPIRATORY (INHALATION)
Status: DISCONTINUED | OUTPATIENT
Start: 2020-11-20 | End: 2020-11-24 | Stop reason: HOSPADM

## 2020-11-20 RX ORDER — DILTIAZEM HYDROCHLORIDE 5 MG/ML
5 INJECTION INTRAVENOUS ONCE
Status: COMPLETED | OUTPATIENT
Start: 2020-11-20 | End: 2020-11-20

## 2020-11-20 RX ORDER — METOPROLOL TARTRATE 5 MG/5ML
2.5 INJECTION INTRAVENOUS
Status: DISPENSED | OUTPATIENT
Start: 2020-11-20 | End: 2020-11-20

## 2020-11-20 RX ORDER — GUAIFENESIN 100 MG/5ML
81 LIQUID (ML) ORAL DAILY
Status: CANCELLED | OUTPATIENT
Start: 2020-11-21

## 2020-11-20 RX ADMIN — LEVOFLOXACIN 750 MG: 5 INJECTION, SOLUTION INTRAVENOUS at 18:30

## 2020-11-20 RX ADMIN — Medication 10 ML: at 05:25

## 2020-11-20 RX ADMIN — METHYLPREDNISOLONE SODIUM SUCCINATE 40 MG: 40 INJECTION, POWDER, FOR SOLUTION INTRAMUSCULAR; INTRAVENOUS at 23:56

## 2020-11-20 RX ADMIN — ENOXAPARIN SODIUM 40 MG: 40 INJECTION SUBCUTANEOUS at 11:28

## 2020-11-20 RX ADMIN — DEXTROSE MONOHYDRATE 5 MG/HR: 50 INJECTION, SOLUTION INTRAVENOUS at 05:04

## 2020-11-20 RX ADMIN — Medication 1 G: at 22:49

## 2020-11-20 RX ADMIN — METOPROLOL TARTRATE 5 MG: 5 INJECTION INTRAVENOUS at 23:59

## 2020-11-20 RX ADMIN — METHYLPREDNISOLONE SODIUM SUCCINATE 40 MG: 40 INJECTION, POWDER, FOR SOLUTION INTRAMUSCULAR; INTRAVENOUS at 11:33

## 2020-11-20 RX ADMIN — Medication 1 G: at 16:23

## 2020-11-20 RX ADMIN — INSULIN LISPRO 2 UNITS: 100 INJECTION, SOLUTION INTRAVENOUS; SUBCUTANEOUS at 18:30

## 2020-11-20 RX ADMIN — AMIODARONE HYDROCHLORIDE 150 MG: 1.5 INJECTION, SOLUTION INTRAVENOUS at 11:38

## 2020-11-20 RX ADMIN — DEXTROSE MONOHYDRATE 10 MG/HR: 50 INJECTION, SOLUTION INTRAVENOUS at 07:56

## 2020-11-20 RX ADMIN — Medication 10 ML: at 14:26

## 2020-11-20 RX ADMIN — AMIODARONE HYDROCHLORIDE 150 MG: 1.5 INJECTION, SOLUTION INTRAVENOUS at 01:10

## 2020-11-20 RX ADMIN — AMIODARONE HYDROCHLORIDE 1 MG/MIN: 50 INJECTION, SOLUTION INTRAVENOUS at 12:04

## 2020-11-20 RX ADMIN — MICONAZOLE NITRATE: 20 CREAM TOPICAL at 11:28

## 2020-11-20 RX ADMIN — DEXTROSE MONOHYDRATE 15 MG/HR: 50 INJECTION, SOLUTION INTRAVENOUS at 22:45

## 2020-11-20 RX ADMIN — IPRATROPIUM BROMIDE AND ALBUTEROL SULFATE 3 ML: .5; 3 SOLUTION RESPIRATORY (INHALATION) at 01:05

## 2020-11-20 RX ADMIN — METHYLPREDNISOLONE SODIUM SUCCINATE 40 MG: 40 INJECTION, POWDER, FOR SOLUTION INTRAMUSCULAR; INTRAVENOUS at 05:24

## 2020-11-20 RX ADMIN — METHYLPREDNISOLONE SODIUM SUCCINATE 40 MG: 40 INJECTION, POWDER, FOR SOLUTION INTRAMUSCULAR; INTRAVENOUS at 18:31

## 2020-11-20 RX ADMIN — INSULIN LISPRO 2 UNITS: 100 INJECTION, SOLUTION INTRAVENOUS; SUBCUTANEOUS at 22:48

## 2020-11-20 RX ADMIN — Medication 10 ML: at 21:31

## 2020-11-20 RX ADMIN — RIVAROXABAN 20 MG: 20 TABLET, FILM COATED ORAL at 18:31

## 2020-11-20 RX ADMIN — INSULIN LISPRO 2 UNITS: 100 INJECTION, SOLUTION INTRAVENOUS; SUBCUTANEOUS at 05:24

## 2020-11-20 RX ADMIN — METOPROLOL TARTRATE 2.5 MG: 5 INJECTION INTRAVENOUS at 14:26

## 2020-11-20 RX ADMIN — DILTIAZEM HYDROCHLORIDE 5 MG: 5 INJECTION INTRAVENOUS at 03:38

## 2020-11-20 RX ADMIN — METOPROLOL TARTRATE 2.5 MG: 5 INJECTION INTRAVENOUS at 11:21

## 2020-11-20 RX ADMIN — DEXTROSE MONOHYDRATE 10 MG/HR: 50 INJECTION, SOLUTION INTRAVENOUS at 16:23

## 2020-11-20 RX ADMIN — DILTIAZEM HYDROCHLORIDE 5 MG: 5 INJECTION INTRAVENOUS at 02:50

## 2020-11-20 RX ADMIN — Medication 1 G: at 11:28

## 2020-11-20 RX ADMIN — MICONAZOLE NITRATE: 20 CREAM TOPICAL at 18:33

## 2020-11-20 NOTE — PROGRESS NOTES
Bedside shift change report given to Mychal Morillo (oncoming nurse) by Servando Casey (offgoing nurse). Report included the following information SBAR, Kardex, ED Summary, Intake/Output, MAR, Recent Results and Cardiac Rhythm NSR/Sinus Tach. 2020: Pt's new onset of . RR 35. /60. No complaints of chest pain, just heart burn. EKG showing Aflutter. Will call Rapid Response. 2026: NP Puralesiaor in the room. Ordered Metoprolol 5mg IV once. 2029: Metoprolol IV given. 2035: Pt .     2036: NP Purveyor verbal ordered another dose of metoprolol 5mg IV once. 2037: Metoprolol IV given. 2040: Pt . Rhythm in afib.     2259: Metoprolol IV 10mg and Diltiazem PO given already but HR continues to be elevated at high 150s-160s. Current BP is 99/59 MAP 69. Will contact MD.     2307: NaCl 250ml bolus and Amiodorone bolus ordered. 2322: NaCl 250ml bolus given. 2340: Amiodarone bolus given. 0032: Updated MD on patient after giving amio bolus and NaCl bolus of 250ml. HR continues to be high. Current  /56 MAP 67    0052: Amiodarone bolus ordered. 0110: Amiodarone bolus given. 0231: Updated MD on patient after Amio bolus given. Bolus ended on 0122. Current . /72. MAP 81.    0237: Diltiazem IV ordered. 0250: Diltiazem IV given. 0335: NP Purveyor rounded on pt. Verbal order for diltiazem IV ordered. 7580: Diltiazem IV given. 2569: NP Purveyor ordered diltiazem infusion 5mg/hr. Awaiting pharmacy to send drug. 0451: [facility] on update where the dilt gtt is, but was sent on a different pyxis. Will send another. 7042: Dilt gtt started at 5mg/hr. 8234: NP Nancyor changed order Dilt gtt to titratable. Dilt gtt titrated to 7.5.     0700: Bedside shift change report given to [address] (oncoming nurse) by Shirline Los RN (offgoing nurse).  Report included the following information SBAR, Kardex, ED Summary, Intake/Output, MAR, Recent Results and Cardiac Rhythm SV Tach/Sinus Tach. End of Shift Note    Bedside shift change report given to [address] [place] [address] (oncoming nurse) by Shaun Hurley RN (offgoing nurse). Report included the following information SBAR, Kardex, ED Summary, Intake/Output, MAR, Recent Results and Cardiac Rhythm SV Tach/Sinus Tach    Shift worked:  Night   Shift summary and any significant changes:     Pt rapid Afib 150s. Medication intervention (see above) continues to be SV Tach 150s on Dilaudid gtt now. Covid - x2     Concerns for physician to address:  Cardio consult  HR controlled   Zone phone for oncoming shift:        Activity:  Activity Level: Bed Rest  Number times ambulated in hallways past shift: 0  Number of times OOB to chair past shift: 0    Cardiac:   Cardiac Monitoring: Yes      Cardiac Rhythm: Supraventricular tachycardia    Access:   Current line(s): PIV     Genitourinary:   Urinary status: incontinent and external catheter    Respiratory:   O2 Device: BIPAP  Chronic home O2 use?: YES  Incentive spirometer at bedside: N/A     GI:  Last Bowel Movement Date: (pta)  Current diet:  DIET DIABETIC CONSISTENT CARB Regular  Passing flatus: YES  Tolerating current diet: YES       Pain Management:   Patient states pain is manageable on current regimen: YES    Skin:  Gaston Score: 14  Interventions: speciality bed, increase time out of bed, PT/OT consult and internal/external urinary devices    Patient Safety:  Fall Score:  Total Score: 2  Interventions: bed/chair alarm, gripper socks and pt to call before getting OOB  High Fall Risk: Yes    Length of Stay:  Expected LOS: 3d 14h  Actual LOS: [facility] ([place], [place]), RN

## 2020-11-20 NOTE — PROGRESS NOTES
Received message from patient's nurse Jose Luisew me that patient has received a second bolus of amiodarone 150 mg which ended at 01 22. Current heart rate 140. /72. MAP 81. Asking if I would like to do any other intervention. Discussion / orders:    Ordered diltiazem 5 mg x 1 bolus  Will repeat x 1   will start cardizem drip to titrate if needed  Continue to monitor heart rate, rhythm, and blood pressure. Please note that this note was dictated using Dragon computer voice recognition software. Quite often unanticipated grammatical, syntax, homophones, and other interpretive errors are inadvertently transcribed by the computer software. Please disregard these errors. Please excuse any errors that have escaped final proofreading.

## 2020-11-20 NOTE — PROGRESS NOTES
Occupational Therapy  Orders received and performed extensive chart review. Pt has been bed bound and cared for by her family (ads and IADLs). She was recently admitted here and discharged home on 11/4/2020. Pt  received Home Health OT Evaluation and was at her baseline so no services were indicated. At that time, pt could feed herself and participated in anterior upper body bathing once set up by her family at bed level. Per medical record, pt had HH PT Evaluation/only a few  sessions then fired the [facility] PT. She was otherwise dependent for adls and IADLs and mobility. This date, pt has had increased HR prompting a rapid response and has HR currently in the 150s. Nursing reports that pt has been feeding herself and is able to reach for needed items. Pt appears to be at her baseline and no OT Evaluation is indicated at this time. Will sign off as pt is at her functional baseline.   Thank you

## 2020-11-20 NOTE — INTERDISCIPLINARY ROUNDS
Interdisciplinary team rounds were held 11/20/2020 with the following team members:Care Management, Nursing, Pharmacy, Physician and Clinical Coordinator. Plan of care discussed. See clinical pathway and/or care plan for interventions and desired outcomes. Covid 19 results are negative. Patient to transfer to step-down. Nursing to follow up with Dr. Charity Serrano regarding palliative consult.

## 2020-11-20 NOTE — PROGRESS NOTES
Comprehensive Nutrition Assessment    Type and Reason for Visit: Initial, Positive nutrition screen    Nutrition Recommendations/Plan:   · Continue Consistent Carb Diet for BG management. · Please document % meals and supplements consumed in flowsheet I/O's under intake. Nutrition Assessment:     11/20: Chart reviewed; med noted for acute resp failure, COPD; hx of JOSLYN, morbid obesity, DM. No acute nutrition needs identified at this time. Estimated Daily Nutrient Needs:  Energy (kcal): 1212 (BMR 1933 x 1. 2AF) - 500 kcals; Weight Used for Energy Requirements: Current  Protein (g): 111 (0.8 g/kg bw); Weight Used for Protein Requirements: Current  Fluid (ml/day): 1800 ml/day; Method Used for Fluid Requirements: 1 ml/kcal    Nutrition Related Findings:  Meds: prednisone, levaquin; -212; edema: 1+/2+      Wounds:      left buttocks, unstageable      Current Nutrition Therapies:  DIET DIABETIC CONSISTENT CARB Regular  DIET NPO    Anthropometric Measures:  · Height:  5' 3\" (160 cm)  · Current Body Wt:  139.9 kg (308 lb 6.8 oz)   · Ideal Body Wt:  115 lbs:  268.2 %   · BMI Category:  Obese class 3 (BMI 40.0 or greater)       Nutrition Diagnosis:   · Overweight/obesity related to (excessive energy intake) as evidenced by (morbid obesity 54.7)    Nutrition Interventions:   Food and/or Nutrient Delivery: Continue current diet  Nutrition Education and Counseling: No recommendations at this time  Coordination of Nutrition Care: No recommendation at this time    Goals:  PO intake >50% of meals next 5-7 days       Nutrition Monitoring and Evaluation:   Food/Nutrient Intake Outcomes: Food and nutrient intake  Physical Signs/Symptoms Outcomes: Biochemical data, Weight, Skin    Discharge Planning:    Continue current diet     Electronically signed by Leon Huffman RD on 11/20/2020 at 2:44 PM

## 2020-11-20 NOTE — PROGRESS NOTES
Rapid Response Team Note    Called by RN at 2026 to see patient for tachycardia stat. Upon entering the room the patient is noted to be tachy and in aflutter -160    Patient has no real complaints and denies chest pain however states she has more of an indigestion    EKG done stat and shows aflutter    Patient's nurse reports that patient had been on metoprolol and Cardizem however this had been stopped. Sara Zuniga is a 63 y.o. WHITE OR  female with past medical history significant for morbid obesity, atrial fibrillation, sleep apnea, COPD, diabetes was admitted for acute encephalopathy and acute respiratory failure with COPD exacerbation causing respiratory acidosis along with metabolic Alkalosis. She Had Been Started on BiPAP and Her Status Improved and This Was Changed to Use As Needed at Bedtime. It Is Noted That Cardizem and Metoprolol were held due to Soft BP. Allergies   Allergen Reactions    Cefazolin Rash    Other Plant, Animal, Environmental Rash     Ivory soap caused rash on hands.           Current Facility-Administered Medications:     miconazole (SECURA) 2 % extra thick cream, , Topical, BID, Edelmira Clark MD    [Held by provider] aspirin chewable tablet 81 mg, 81 mg, Oral, DAILY, Dami Simpson MD    [Held by provider] atorvastatin (LIPITOR) tablet 10 mg, 10 mg, Oral, QHS, Dami Simpson MD    [Held by provider] dilTIAZem ER (CARDIZEM CD) capsule 120 mg, 120 mg, Oral, DAILY, Dami Simpson MD    [Held by provider] furosemide (LASIX) tablet 40 mg, 40 mg, Oral, DAILY, Trace Cox MD    zinc oxide-cod liver oil (DESITIN) 40 % paste 1 g, 1 g, Topical, TID, Dami Simpson MD, 1 g at 11/19/20 1741    [Held by provider] metoprolol tartrate (LOPRESSOR) tablet 25 mg, 25 mg, Oral, BID, Dami Simpson MD    sodium chloride (NS) flush 5-40 mL, 5-40 mL, IntraVENous, Q8H, Trace Cox MD, 10 mL at 11/19/20 0546    sodium chloride (NS) flush 5-40 mL, 5-40 mL, IntraVENous, PRN, Magdiel Caballero MD    acetaminophen (TYLENOL) tablet 650 mg, 650 mg, Oral, Q6H PRN **OR** acetaminophen (TYLENOL) suppository 650 mg, 650 mg, Rectal, Q6H PRN, Magdiel Caballero MD    polyethylene glycol (MIRALAX) packet 17 g, 17 g, Oral, DAILY PRN, Magdiel Caballero MD    promethazine (PHENERGAN) tablet 12.5 mg, 12.5 mg, Oral, Q6H PRN **OR** ondansetron (ZOFRAN) injection 4 mg, 4 mg, IntraVENous, Q6H PRN, Magdiel Caballero MD    enoxaparin (LOVENOX) injection 40 mg, 40 mg, SubCUTAneous, Q12H, Magdiel Caballero MD, 40 mg at 11/19/20 2039    methylPREDNISolone (PF) (SOLU-MEDROL) injection 40 mg, 40 mg, IntraVENous, Q6H, Magdiel Caballero MD, 40 mg at 11/19/20 1738    [Held by provider] furosemide (LASIX) injection 40 mg, 40 mg, IntraVENous, DAILY, Magdiel Caballero MD    metoprolol (LOPRESSOR) injection 5 mg, 5 mg, IntraVENous, Q6H PRN, Magdiel Caballero MD, 5 mg at 11/19/20 2029    levoFLOXacin (LEVAQUIN) 750 mg in D5W IVPB, 750 mg, IntraVENous, Q24H, Magdiel Caballero MD, Last Rate: 100 mL/hr at 11/19/20 1853, 750 mg at 11/19/20 1853    acetaZOLAMIDE (DIAMOX) 500 mg in sterile water (preservative free) 5 mL injection, 500 mg, IntraVENous, Q24H, Karely BARRIENTOS MD, 500 mg at 11/18/20 2210    insulin lispro (HUMALOG) injection, , SubCUTAneous, Q6H, Magdiel Caballero MD, Stopped at 11/19/20 0000    glucose chewable tablet 16 g, 4 Tab, Oral, PRN, Magdiel Caballero MD    dextrose (D50W) injection syrg 12.5-25 g, 12.5-25 g, IntraVENous, PRN, Magdiel Caballero MD    glucagon (GLUCAGEN) injection 1 mg, 1 mg, IntraMUSCular, PRN, Magdiel Caballero MD    albuterol-ipratropium (DUO-NEB) 2.5 MG-0.5 MG/3 ML, 3 mL, Nebulization, Q6H RT, Magdiel Caballero MD, Stopped at 11/19/20 1400       Visit Vitals  BP (!) 118/51 (BP 1 Location: Right arm, BP Patient Position: At rest)   Pulse 95   Temp 99.2 °F (37.3 °C)   Resp 28   Ht 5' 3\" (1.6 m)   Wt 139.4 kg (307 lb 5.1 oz)   SpO2 97%   BMI 54.44 kg/m²          Review of Systems   Respiratory: Positive for shortness of breath (mild). Negative for chest tightness. Cardiovascular: Negative for chest pain and palpitations. Gastrointestinal: Negative for abdominal pain. C/o indigestion          Physical Exam  Cardiovascular:      Rate and Rhythm: Tachycardia present. Rhythm irregular. Pulmonary:      Effort: Pulmonary effort is normal.   Abdominal:      Palpations: Abdomen is soft. Neurological:      Mental Status: She is alert. Pertinent Recent Labs and Xrays:    Recent Labs     11/19/20  0107 11/18/20  1213   WBC 7.6 8.4   HGB 12.8 13.9   HCT 44.2 49.3*   * 157     Recent Labs     11/18/20  1236 11/18/20  1213   NA  --  141   K  --  4.8   CL  --  89*   CO2  --  >45*   BUN  --  22*   CREA  --  0.57   GLU  --  140*   CA  --  10.0   MG 2.4  --      Recent Labs     11/19/20  0107 11/18/20  1236   INR 1.1 1.0     No results for input(s): PH, PCO2, PO2 in the last 72 hours. Recent Labs     11/18/20  1639 11/18/20  1416   PHI 7.23* 7.21*   PO2I 74* 40*   PCO2I >90.0* >90.0*     Recent Labs     11/18/20  1213   TROIQ <0.05     Lab Results   Component Value Date/Time    Glucose (POC) 124 (H) 11/19/2020 05:37 PM    Glucose (POC) 135 (H) 11/19/2020 12:53 PM    Glucose (POC) 129 (H) 11/19/2020 07:05 AM    Glucose (POC) 107 (H) 11/18/2020 11:07 PM    Glucose (POC) 146 (H) 11/18/2020 07:04 PM          Recent Imaging studies(If Any)    CXR Results  (Last 48 hours)               11/18/20 1355  XR CHEST PORT Final result    Impression:  IMPRESSION: There is increasing interstitial and vague airspace opacities in the   lungs may represent edema versus atypical pneumonia. .. Cardiomegaly appears   stable allowing for differences in technique. .               Narrative:  EXAM:  XR CHEST PORT       INDICATION:  sob       COMPARISON:  2020       FINDINGS: A portable AP radiograph of the chest was obtained at 1345 hours. Patient is rotated to the left. The patient is on a cardiac monitor. There is   increasing interstitial and vague pulmonary opacities in the mid to lower lung   zones may represent edema but is nonspecific. .  Cardiomegaly is stable. .  There   are degenerative changes in the thoracic spine. Echo Results  (Last 48 hours)    None           CT Results  (Last 48 hours)    None           EKG RESULTS     Procedure Component Value Units Date/Time    EKG, 12 LEAD, INITIAL [[id number]]     Order Status:  Sent     EKG 12 LEAD INITIAL [[id number]] Collected:  11/18/20 1216    Order Status:  Completed Updated:  11/18/20 1258     Ventricular Rate 69 BPM      Atrial Rate 69 BPM      P-R Interval 152 ms      QRS Duration 80 ms      Q-T Interval 412 ms      QTC Calculation (Bezet) 441 ms      Calculated P Axis 62 degrees      Calculated R Axis 69 degrees      Calculated T Axis 49 degrees      Diagnosis --     Normal sinus rhythm  Inferior infarct (cited on or before 18-NOV-2020)     Confirmed by Quan Girard M.D. ([id number]) on 11/18/2020 12:58:27 PM      EKG, 12 LEAD, INITIAL [[id number]]     Order Status:  Sent     EKG, 12 LEAD, INITIAL [[id number]]     Order Status:  Sent     EKG, 12 LEAD, INITIAL [[id number]] Collected:  11/01/20 0015    Order Status:  Completed Updated:  11/01/20 1307     Ventricular Rate 78 BPM      Atrial Rate 78 BPM      P-R Interval 148 ms      QRS Duration 82 ms      Q-T Interval 400 ms      QTC Calculation (Bezet) 456 ms      Calculated P Axis 52 degrees      Calculated R Axis 56 degrees      Calculated T Axis 47 degrees      Diagnosis --     Normal sinus rhythm  Normal ECG  When compared with ECG of 14-AUG-2020 12:34,  premature atrial complexes are no longer present  T wave inversion no longer evident in Inferior leads  Confirmed by Celeste Dewitt ([id number]) on 11/1/2020 1:07:34 PM             07/23/20   ECHO ADULT COMPLETE 07/27/2020 7/27/2020    Narrative · LV: Normal cavity size and systolic function (ejection fraction normal).    Mild concentric hypertrophy. Estimated left ventricular ejection fraction   is 50 - 55%. Visually measured ejection fraction. Signed by: Cristian Heart, DO            Recent Microbiology Data(If Any)  .   All Micro Results     Procedure Component Value Units Date/Time    CULTURE, BLOOD #1 [[id number]] Collected:  11/1960    Order Status:  Completed Specimen:  Blood Updated:  11/19/20 0717     Special Requests: NO SPECIAL REQUESTS        Culture result: NO GROWTH AFTER 11 HOURS       CULTURE, BLOOD #2 [[id number]] Collected:  11/18/20 1948    Order Status:  Completed Specimen:  Blood Updated:  11/19/20 0717     Special Requests: NO SPECIAL REQUESTS        Culture result: NO GROWTH AFTER 11 HOURS       RESPIRATORY PANEL,PCR,NASOPHARYNGEAL [[id number]] Collected:  11/18/20 2038    Order Status:  Completed Specimen:  Nasopharyngeal Updated:  11/18/20 2342     Adenovirus Not detected        Coronavirus 229E Not detected        Coronavirus HKU1 Not detected        Coronavirus CVNL63 Not detected        Coronavirus OC43 Not detected        Metapneumovirus Not detected        Rhinovirus and Enterovirus Not detected        Influenza A Not detected        Influenza A, subtype H1 Not detected        Influenza A, subtype H3 Not detected        INFLUENZA A H1N1 PCR Not detected        Influenza B Not detected        Parainfluenza 1 Not detected        Parainfluenza 2 Not detected        Parainfluenza 3 Not detected        Parainfluenza virus 4 Not detected        RSV by PCR Not detected        B. parapertussis, PCR Not detected        Bordetella pertussis - PCR Not detected        Chlamydophila pneumoniae DNA, QL, PCR Not detected        Mycoplasma pneumoniae DNA, QL, PCR Not detected                LAB DATA REVIEWED:    Recent Results (from the past 24 hour(s))   GLUCOSE, POC    Collection Time: 11/18/20 11:07 PM   Result Value Ref Range    Glucose (POC) 107 (H) 65 - 100 mg/dL    Performed by Mitchel Steinberg RN    PROTHROMBIN TIME + INR    Collection Time: 11/19/20  1:07 AM   Result Value Ref Range    INR 1.1 0.9 - 1.1      Prothrombin time 11.7 (H) 9.0 - 11.1 sec   D DIMER    Collection Time: 11/19/20  1:07 AM   Result Value Ref Range    D-dimer 0.97 (H) 0.00 - 0.65 mg/L FEU   CBC WITH AUTOMATED DIFF    Collection Time: 11/19/20  1:07 AM   Result Value Ref Range    WBC 7.6 3.6 - 11.0 K/uL    RBC 4.03 3.80 - 5.20 M/uL    HGB 12.8 11.5 - 16.0 g/dL    HCT 44.2 35.0 - 47.0 %    .7 (H) 80.0 - 99.0 FL    MCH 31.8 26.0 - 34.0 PG    MCHC 29.0 (L) 30.0 - 36.5 g/dL    RDW 13.2 11.5 - 14.5 %    PLATELET 774 (L) 486 - 400 K/uL    MPV 13.1 (H) 8.9 - 12.9 FL    NRBC 0.0 0  WBC    ABSOLUTE NRBC 0.00 0.00 - 0.01 K/uL    NEUTROPHILS 93 (H) 32 - 75 %    LYMPHOCYTES 6 (L) 12 - 49 %    MONOCYTES 1 (L) 5 - 13 %    EOSINOPHILS 0 0 - 7 %    BASOPHILS 0 0 - 1 %    IMMATURE GRANULOCYTES 0 0.0 - 0.5 %    ABS. NEUTROPHILS 7.0 1.8 - 8.0 K/UL    ABS. LYMPHOCYTES 0.5 (L) 0.8 - 3.5 K/UL    ABS. MONOCYTES 0.1 0.0 - 1.0 K/UL    ABS. EOSINOPHILS 0.0 0.0 - 0.4 K/UL    ABS. BASOPHILS 0.0 0.0 - 0.1 K/UL    ABS. IMM.  GRANS. 0.0 0.00 - 0.04 K/UL    DF SMEAR SCANNED      RBC COMMENTS MACROCYTOSIS  1+        RBC COMMENTS STOMATOCYTES  1+       FIBRINOGEN    Collection Time: 11/19/20  1:07 AM   Result Value Ref Range    Fibrinogen 506 (H) 200 - 475 mg/dL   GLUCOSE, POC    Collection Time: 11/19/20  7:05 AM   Result Value Ref Range    Glucose (POC) 129 (H) 65 - 100 mg/dL    Performed by Mahendra Stewart RN    GLUCOSE, POC    Collection Time: 11/19/20 12:53 PM   Result Value Ref Range    Glucose (POC) 135 (H) 65 - 100 mg/dL    Performed by Dian Moody, POC    Collection Time: 11/19/20  5:37 PM   Result Value Ref Range    Glucose (POC) 124 (H) 65 - 100 mg/dL    Performed by Brayden Camacho             Assessment, action, and plan    A-flutter rate uncontrolled    · Received dose of metoprolol IV 5 mg which helped heart rate some however blood pressure too soft for a second dose. · Had been on Cardizem  mg daily which has been on hold  · Restarted Cardizem however IR form at 60 mg 3 times daily before meals. Patient received dose of Cardizem around 2200. Received message from patient's nurse around 2300 that patient is heart rate is now again back to 150s to 160s with blood pressure 99/59 MAP 69    I evaluated patient in person again and she is in no acute distress. · Ordered amiodarone 150 mg bolus  · Ordered 250 mL bolus normal saline  · Cardiology consult  · Echo           Signed by:  Wanda Martin, KILEY, ACNP-BC    Critical care time unrelated to prior notes: 50 minutes    Please note that this note was dictated using Dragon computer voice recognition software. Quite often unanticipated grammatical, syntax, homophones, and other interpretive errors are inadvertently transcribed by the computer software. Please disregard these errors. Please excuse any errors that have escaped final proofreading.

## 2020-11-20 NOTE — CONSULTS
[facility]  Pulmonary, Critical Care, and Sleep Medicine    Initial Patient Consult    Name: Jose Maria Barnes MRN: [id number]   : 1960 Hospital: [facility]   Date: 2020           IMPRESSION:   · SVT: heart rate of 150. ? Aflutter or fib. ON Dilt drip, but still with increased HR. · Abnormal CXR: may be pulmonary edema or atypical pneumonia. · Acute on chronic hypoxemic (3-4 l at baseline), hypercapnic respiratory failure - better after treatment with bipap. ABG on 20: 7.21/>90/40. on bipap last pm: 7.23/>90/74/50 on bipap. · Rapid Covid test was negative in ER. · Encephalopathy (for several days prior to admission. Now better. · Suspected COPD exacerbation. History of tobacco abuse, possible COPD but unknown FEV1,   · Probable JOSLYN/OHV   · CHF exacerbation   · Afib -paroxysmal. Not anticoagulated to to medical compliance issues. · Morbid obesity   · Noncompliance   · Hyperglycemia due to steroid use. · Debility -severe DJD of knees. · [facility]; [phone]. · Full Code.        RECOMMENDATIONS:   · For Cardiology eval for Afib with RVR. · Will check BNP and Procalcitonin in am.   · Bipap whenever she is asleep at night and during day as needed. · Can follow ABGs as needed and will recheck in am.   · Can try and get bedside PFTs prior to discharge. · Agree with diuresis, on diamox for now. · Afib per cards Dilt for rate control. · Duonebs  · Glycemic control. · On empiric levofloxacin  · ON Enoxaparin  · Will follow along with your for now. · We can see her in outpatient follow up via telemedicine since she can't come and see us in person. We can arrange to see her in pulm and sleep clinics after d/c. Subjective:     Seen this am.   She feels that she is more short of breath this am.  NO chest pain, no back pain, no GERD.    Has been with an increased Heart rate since last pm.     This patient has been seen and evaluated at the request of Dr. Oumou Hennessy for above. Patient is a 63 y.o. female who presented to ER for evaluation of worsening dyspnea. She is well know to myself. When seen this am. She was off the bipap. Seems to be conversant and comfortable. Had been discharged about 2 weeks ago with similar complaints. Denies chest pain, no back pain, no leg pain. NO headaches. Not able to get additional hx from pt. Does report ongoing right knee pain. Per EHR: 11/18: admitted through ER. Family called EMS due to pt have respiratory difficulty. Pt was on RA with pox of 80%. She was placed on 10L NRB and transported to ER. Was also complaining or right knee pain. Has really severe knee pain per her report at baseline. Past Medical History:   Diagnosis Date    Atrial fibrillation ([facility] 11/4/2020    CH[facility]congestive [facility] failure[facility] ([facility])     Diabetes ([facility]     Hypertension     Ill-defined condition     high cholesterol    Ill-defined condition     tachycardia    Other and unspecified symptoms and signs involving general sensations and perceptions     ruptured disc    S/P cardiac cath 11/4/2020    11/3/2020 nonobstructive disease      No past surgical history on file. Prior to Admission medications    Medication Sig Start Date End Date Taking? Authorizing Provider   menthol-zinc oxide (Moisture Barrier Ointment) 0.44-20.6 % oint Apply 1 Each to affected area three (3) times daily. thin layer moisture associated lesion    Provider, Historical   aspirin 81 mg chewable tablet Take 1 Tab by mouth daily. 11/5/20   Jocelyne Ibrahim MD   dilTIAZem ER (CARDIZEM CD) 120 mg capsule Take 1 Cap by mouth daily. 11/5/20   Jocelyne Ibrahim MD   OXYGEN-AIR DELIVERY SYSTEMS 3 L/min by Nasal route continuous. 8/31/20   Carmine Melvin NP   bismuth subsalicylate (PEPTO-BISMOL MAXIMUM STRENGTH) 525 mg/15 mL susp Take 30 mL by mouth daily as needed for Diarrhea or Nausea.     Provider, Historical acetaminophen (TYLENOL) 325 mg tablet Take 650 mg by mouth every six (6) hours as needed for Pain. Provider, Historical   furosemide (LASIX) 40 mg tablet Take 1 Tab by mouth daily. 8/7/20   Diamond Ford MD   atorvastatin (LIPITOR) 10 mg tablet Take 10 mg by mouth nightly. Russ Cano MD   metoprolol (LOPRESSOR) 25 mg tablet Take 25 mg by mouth two (2) times a day. Russ Cano MD     Allergies   Allergen Reactions    Cefazolin Rash    Other Plant, Animal, Environmental Rash     Ivory soap caused rash on hands. Social History     Tobacco Use    Smoking status: Former Smoker    Smokeless tobacco: Never Used   Substance Use Topics    Alcohol use: No      No family history on file.      Current Facility-Administered Medications   Medication Dose Route Frequency    dilTIAZem (CARDIZEM) 100 mg in dextrose 5% (MBP/ADV) 100 mL infusion  0-15 mg/hr IntraVENous TITRATE    metoprolol (LOPRESSOR) injection 2.5 mg  2.5 mg IntraVENous Q1H    amiodarone (CORDARONE) 375 mg/250 mL D5W infusion  1 mg/min IntraVENous CONTINUOUS    Followed by   [address] [place] [address] amiodarone (CORDARONE) 375 mg/250 mL D5W infusion  0.5 mg/min IntraVENous CONTINUOUS    miconazole (SECURA) 2 % extra thick cream   Topical BID    aspirin chewable tablet 81 mg  81 mg Oral DAILY    [Held by provider] atorvastatin (LIPITOR) tablet 10 mg  10 mg Oral QHS    [Held by provider] furosemide (LASIX) tablet 40 mg  40 mg Oral DAILY    zinc oxide-cod liver oil (DESITIN) 40 % paste 1 g  1 g Topical TID    [Held by provider] metoprolol tartrate (LOPRESSOR) tablet 25 mg  25 mg Oral BID    sodium chloride (NS) flush 5-40 mL  5-40 mL IntraVENous Q8H    enoxaparin (LOVENOX) injection 40 mg  40 mg SubCUTAneous Q12H    methylPREDNISolone (PF) (SOLU-MEDROL) injection 40 mg  40 mg IntraVENous Q6H    [Held by provider] furosemide (LASIX) injection 40 mg  40 mg IntraVENous DAILY    levoFLOXacin (LEVAQUIN) 750 mg in D5W IVPB  750 mg IntraVENous Q24H    acetaZOLAMIDE (DIAMOX) 500 mg in sterile water (preservative free) 5 mL injection  500 mg IntraVENous Q24H    insulin lispro (HUMALOG) injection   SubCUTAneous Q6H    albuterol-ipratropium (DUO-NEB) 2.5 MG-0.5 MG/3 ML  3 mL Nebulization Q6H RT       Review of Systems:  A comprehensive review of systems was negative. Except as noted above in the HPI. Objective:   Vital Signs:    Visit Vitals  /71 (BP 1 Location: Right arm)   Pulse (!) 160   Temp 98.6 °F (37 °C)   Resp (!) 38   Ht 5' 3\" (1.6 m)   Wt 139.9 kg (308 lb 8 oz)   SpO2 90%   BMI 54.65 kg/m²       O2 Device: Nasal cannula   O2 Flow Rate (L/min): 4 l/min   Temp (24hrs), Av.4 °F (36.9 °C), Min:97.5 °F (36.4 °C), Max:99.2 °F (37.3 °C)       Intake/Output:   Last shift:      No intake/output data recorded. Last 3 shifts:  1901 -  0700  In: 650 [I.V.:650]  Out: 2500 [Urine:2500]    Intake/Output Summary (Last 24 hours) at 2020 1139  Last data filed at 2020 0330  Gross per 24 hour   Intake 650 ml   Output 1200 ml   Net -550 ml      Physical Exam:   General:  Alert, cooperative, no distress, appears stated age. Seems unkempt. Dirty fingernails. HR in the 150s. Head:  Normocephalic, without obvious abnormality, atraumatic. Eyes:  Conjunctivae/corneas clear. PERRL, EOMs intact. Nose: Nares normal. Septum midline. Mucosa normal. No drainage or sinus tenderness. Throat: Lips, mucosa, and tongue normal. Teeth and gums normal.   Neck: Supple, symmetrical, trachea midline, no adenopathy, thyroid: no enlargment/tenderness/nodules, no carotid bruit and no JVD. Back:   Symmetric, no curvature. ROM normal.   Lungs:   Clear to auscultation bilaterally. Chest wall:  No tenderness or deformity. Heart:  Regular rate and rhythm, S1, S2 normal, no murmur, click, rub or gallop. Abdomen:   Soft, non-tender. Bowel sounds normal. No masses,  No organomegaly. Extremities: Extremities normal, atraumatic, no cyanosis.  Mild redness on dorsum of right foot. Pulses: 2+ and symmetric all extremities. Skin: Skin color, texture, turgor normal. No rashes or lesions   Lymph nodes: Cervical, supraclavicular, and axillary nodes normal.   Neurologic: Grossly nonfocal, motor seems intact. Data review:     Recent Results (from the past 24 hour(s))   GLUCOSE, POC    Collection Time: 11/19/20 12:53 PM   Result Value Ref Range    Glucose (POC) 135 (H) 65 - 100 mg/dL    Performed by [id number], POC    Collection Time: 11/19/20  5:37 PM   Result Value Ref Range    Glucose (POC) 124 (H) 65 - 100 mg/dL    Performed by [address], POC    Collection Time: 11/19/20 11:34 PM   Result Value Ref Range    Glucose (POC) 131 (H) 65 - 100 mg/dL    Performed by Glenn Trevizo \"Yaya\" (TRV RN)    PROTHROMBIN TIME + INR    Collection Time: 11/20/20 12:00 AM   Result Value Ref Range    INR 1.2 (H) 0.9 - 1.1      Prothrombin time 12.1 (H) 9.0 - 11.1 sec   D DIMER    Collection Time: 11/20/20 12:00 AM   Result Value Ref Range    D-dimer 0.66 (H) 0.00 - 0.65 mg/L FEU   CBC WITH AUTOMATED DIFF    Collection Time: 11/20/20 12:00 AM   Result Value Ref Range    WBC 6.5 3.6 - 11.0 K/uL    RBC 4.05 3.80 - 5.20 M/uL    HGB 12.8 11.5 - 16.0 g/dL    HCT 41.2 35.0 - 47.0 %    .7 (H) 80.0 - 99.0 FL    MCH 31.6 26.0 - 34.0 PG    MCHC 31.1 30.0 - 36.5 g/dL    RDW 13.4 11.5 - 14.5 %    PLATELET 423 (L) 133 - 400 K/uL    MPV 12.5 8.9 - 12.9 FL    NRBC 0.0 0  WBC    ABSOLUTE NRBC 0.00 0.00 - 0.01 K/uL    NEUTROPHILS 88 (H) 32 - 75 %    LYMPHOCYTES 8 (L) 12 - 49 %    MONOCYTES 4 (L) 5 - 13 %    EOSINOPHILS 0 0 - 7 %    BASOPHILS 0 0 - 1 %    IMMATURE GRANULOCYTES 0 0.0 - 0.5 %    ABS. NEUTROPHILS 5.7 1.8 - 8.0 K/UL    ABS. LYMPHOCYTES 0.5 (L) 0.8 - 3.5 K/UL    ABS. MONOCYTES 0.3 0.0 - 1.0 K/UL    ABS. EOSINOPHILS 0.0 0.0 - 0.4 K/UL    ABS. BASOPHILS 0.0 0.0 - 0.1 K/UL    ABS. IMM.  GRANS. 0.0 0.00 - 0.04 K/UL    DF SMEAR SCANNED PLATELET COMMENTS Large Platelets      RBC COMMENTS BASOPHILIC STIPPLING  PRESENT       METABOLIC PANEL, COMPREHENSIVE    Collection Time: 11/20/20 12:00 AM   Result Value Ref Range    Sodium 136 136 - 145 mmol/L    Potassium 3.8 3.5 - 5.1 mmol/L    Chloride 94 (L) 97 - 108 mmol/L    CO2 40 (H) 21 - 32 mmol/L    Anion gap 2 (L) 5 - 15 mmol/L    Glucose 136 (H) 65 - 100 mg/dL    BUN 30 (H) 6 - 20 MG/DL    Creatinine 0.68 0.55 - 1.02 MG/DL    BUN/Creatinine ratio 44 (H) 12 - 20      GFR est AA >60 >60 ml/min/1.73m2    GFR est non-AA >60 >60 ml/min/1.73m2    Calcium 9.8 8.5 - 10.1 MG/DL    Bilirubin, total 0.6 0.2 - 1.0 MG/DL    ALT (SGPT) 33 12 - 78 U/L    AST (SGOT) 54 (H) 15 - 37 U/L    Alk. phosphatase 121 (H) 45 - 117 U/L    Protein, total 8.0 6.4 - 8.2 g/dL    Albumin 2.6 (L) 3.5 - 5.0 g/dL    Globulin 5.4 (H) 2.0 - 4.0 g/dL    A-G Ratio 0.5 (L) 1.1 - 2.2     MAGNESIUM    Collection Time: 11/20/20 12:00 AM   Result Value Ref Range    Magnesium 2.2 1.6 - 2.4 mg/dL   PHOSPHORUS    Collection Time: 11/20/20 12:00 AM   Result Value Ref Range    Phosphorus 2.6 2.6 - 4.7 MG/DL   PROCALCITONIN    Collection Time: 11/20/20 12:00 AM   Result Value Ref Range    Procalcitonin 0.05 ng/mL   NT-PRO BNP    Collection Time: 11/20/20 12:00 AM   Result Value Ref Range    NT pro- (H) <125 PG/ML   FIBRINOGEN    Collection Time: 11/20/20 12:00 AM   Result Value Ref Range    Fibrinogen 429 200 - 475 mg/dL   SAMPLES BEING HELD    Collection Time: 11/20/20 12:00 AM   Result Value Ref Range    SAMPLES BEING HELD PST LV     COMMENT        Add-on orders for these samples will be processed based on acceptable specimen integrity and analyte stability, which may vary by analyte.    GLUCOSE, POC    Collection Time: 11/20/20  5:19 AM   Result Value Ref Range    Glucose (POC) 173 (H) 65 - 100 mg/dL    Performed by Jayesh Griffin \"Yaya\" (TIESHA RN)    GLUCOSE, POC    Collection Time: 11/20/20  8:52 AM   Result Value Ref Range    Glucose (POC) 177 (H) 65 - 100 mg/dL    Performed by Colette Renee (CON)        Imaging:  I have personally reviewed the patients radiographs and have reviewed the reports:  11-18-20: CXR: IMPRESSION: There is increasing interstitial and vague airspace opacities in the  lungs may represent edema versus atypical pneumonia. .. Cardiomegaly appears  stable allowing for differences in technique. Benjamin Chaudhary MD

## 2020-11-20 NOTE — PROGRESS NOTES
11/20/20 0745   Vital Signs   Temp 97.5 °F (36.4 °C)   Temp Source Oral   Pulse (Heart Rate) (!) 153   Heart Rate Source Monitor   Cardiac Rhythm SVT   Resp Rate 22   O2 Sat (%) 99 %   Level of Consciousness Alert   BP 99/61   MAP (Monitor) 71   BP 1 Method Automatic   BP 1 Location Right arm   BP Patient Position Head of bed elevated (Comment degrees)   MEWS Score 6   Alarms Set and Audible Cardiac alarms   Box Number hardwire   Electrodes Replaced No   Pain 1   Pain Scale 1 Numeric (0 - 10)   Pain Intensity 1 0   Patient Stated Pain Goal 0   Oxygen Therapy   O2 Device BIPAP   FIO2 (%) 40 %   Patient Observation   Special Appliances/Equipment Bi-pap   Repositioned Head of bed elevated (degrees)   MEW =6. Doc Anne notified awaiting new orders, Titrated up on Dilt drip, see MAR

## 2020-11-20 NOTE — PROGRESS NOTES
0747.Bedside and Verbal shift change report given to Magdaleno (oncoming nurse) by Sukhdev (offgoing nurse). Report included the following information SBAR, Kardex and MAR.    0756 Titrated dilt drip from 7.5mg/hr to 10mg/hr     0800 Dr.Tan recinos regarding patients HR remaining in the 150's. No orders placed. 0830 Consult to cardiology placed. 0845 STAT consult to cardiology placed. 1130 Orders placed cardiology NP regarding patients HR.     1138 Amiodarone 150mg bolus given. 1204 Amio drip 1mg/min started. 1833 Amio drip titrated to 0.5mg/min    1930 . TRANSFER - OUT REPORT:    Verbal report given to [facility] (name) on Natalie Braga  being transferred to PCU (unit) for routine progression of care       Report consisted of patients Situation, Background, Assessment and   Recommendations(SBAR). Information from the following report(s) SBAR, Kardex and MAR was reviewed with the receiving nurse. Lines:   Peripheral IV 11/19/20 Anterior; Left Hand (Active)   Site Assessment Clean, dry, & intact 11/20/20 0745   Phlebitis Assessment 0 11/20/20 0745   Infiltration Assessment 0 11/20/20 0745   Dressing Status Clean, dry, & intact 11/20/20 0745   Dressing Type Transparent 11/20/20 0745   Hub Color/Line Status Flushed;Blue 11/20/20 0745   Action Taken Open ports on tubing capped 11/19/20 1800   Alcohol Cap Used Yes 11/20/20 0745       Peripheral IV 11/20/20 Anterior;Right Forearm (Active)   Site Assessment Clean, dry, & intact 11/20/20 0745   Phlebitis Assessment 0 11/20/20 0745   Infiltration Assessment 0 11/20/20 0745   Dressing Status Clean, dry, & intact 11/20/20 0745   Dressing Type Transparent 11/20/20 0745   Hub Color/Line Status Pink; Infusing 11/20/20 0745   Alcohol Cap Used Yes 11/20/20 0330        Opportunity for questions and clarification was provided.       Patient transported with:   O2 @ 4 liters   1 RN  2 PCTs

## 2020-11-20 NOTE — PROGRESS NOTES
Physical Therapy    Order received and chart reviewed. Noted in chart that pt has been bedridden and unable to leave her house since her discharge from this hospital 8/7/2020. Pt discharged home and received only 2 HHPT visit and then declined further services. HHPT notes state pt is noncompliant and continues to be bedridden, requiring maximal assistance of 2 people to roll in bed. Pt recently discharged from [facility] 11/4/2020 and didn't receive acute PT services. Pt discharged home with HHPT services and again only received 2 HHPT visits and then declined further services. Pt remains bedridden and unable to sit or stand. Pt not appropriate for acute PT services at this time. Pt may be most appropriate in a LTC facility if family is not able to care for her at home. Will sign off at this time.

## 2020-11-20 NOTE — PROGRESS NOTES
11/20/20 1100   Vital Signs   Temp 98.6 °F (37 °C)   Temp Source Oral   Pulse (Heart Rate) (!) 160   Heart Rate Source Monitor   Resp Rate (!) 38   O2 Sat (%) 90 %   Level of Consciousness Alert   /71   MAP (Monitor) 78   BP 1 Method Automatic   BP 1 Location Right arm   MEWS Score 6   Alarms Set and Audible Cardiac alarms   Box Number hardwire   Electrodes Replaced No   Pain 1   Pain Scale 1 Numeric (0 - 10)   Pain Intensity 1 0   Patient Stated Pain Goal 0   Oxygen Therapy   Pulse via Oximetry 158 beats per minute   O2 Device Nasal cannula   O2 Flow Rate (L/min) 4 l/min   Patient Observation   Special Appliances/Equipment Bi-pap   mew =6. Due to continued afib RVR Charis Simpson NP at bedside

## 2020-11-20 NOTE — PROGRESS NOTES
Hospitalist Progress Note    NAME: Yamini Segura   :  1960   MRN:  [id number]     Interim Hospital Summary: 63 y.o. female whom presented on 2020 with      Assessment / Plan:    Acute on chronic hypoxic and hypercapnic respiratory failure  Acute metabolic encephalopathy, resolved  Suspect COPD exacerbation (unknown FEV1)  Hx Tobacco abuse  -CXR There is increasing interstitial and vague airspace opacities in the lungs may represent edema versus atypical pneumonia. .. Cardiomegaly appears stable allowing for differences in technique  -SARS COV2 rapid NEG, PCR NEG  -baseline 3-4L oxgyen  -mental status improved with BIPAP support - use at HS prn  -continue antibiotics. PCT 0.06  -continue IV steroids  -diamox prn. Serum C02 has come down  -appreciate pulmonary input. Try get bedside PFTs prior to discharge. Ideally should get outpatient sleep study but patient does not walk or get out of there house.    -can transfer out of covid unit    Afib RVR  -not on anticoagulation as an outpatient due to compliance issues, cont ASA  -now on cardizem infusion with addition of amiodarone   -cardiology planning YANNA/AFL ablation on Monday    DM2  -SSI prn    40 or above Morbid obesity / Body mass index is 54.65 kg/m². Code status: Full  Prophylaxis: Lovenox  Recommended Disposition: Home w/Family       Subjective:     Chief Complaint / Reason for Physician Visit  Follow up of AMS, COPD, OHS, Afib and encephalopathy  Chart reviewed in detail. Discussed with RN events overnight.    Off BIPAP since this AM.   Alert    Review of Systems:  Symptom Y/N Comments  Symptom Y/N Comments   Fever/Chills    Chest Pain     Poor Appetite    Edema     Cough    Abdominal Pain     Sputum    Joint Pain     SOB/CHOPRA   improved  Pruritis/Rash     Nausea/vomit    Tolerating PT/OT     Diarrhea    Tolerating Diet     Constipation    Other       Could NOT obtain due to:      PO intake: No data found. Objective:     VITALS:   Last 24hrs VS reviewed since prior progress note. Most recent are:  Patient Vitals for the past 24 hrs:   Temp Pulse Resp BP SpO2   11/20/20 1426 (P) 98.6 °F (37 °C) (!) 117 (!) (P) 37 129/68    11/20/20 1419  (!) 119 19 129/68    11/20/20 1330   28 107/61    11/20/20 1315  82 (!) 41 103/73    11/20/20 1200  96 23 (!) 129/94 93 %   11/20/20 1145  (!) 104 (!) 36 (!) 126/101 93 %   11/20/20 1100 98.6 °F (37 °C) (!) 160 (!) 38 110/71 90 %   11/20/20 0900     92 %   11/20/20 0830  (!) 152 24 (!) 108/53 91 %   11/20/20 0815  (!) 150 23 111/69 96 %   11/20/20 0745 97.5 °F (36.4 °C) (!) 153 22 99/61 99 %   11/20/20 0330 98.5 °F (36.9 °C) (!) 146 21 113/67 100 %   11/20/20 0327     98 %   11/20/20 0105     98 %   11/20/20 0015 97.9 °F (36.6 °C) (!) 146 27 101/61 100 %   11/19/20 2319     95 %   11/19/20 2234  (!) 155  (!) 108/57    11/19/20 1940 98.4 °F (36.9 °C) 100 (!) 32 (!) 123/58 98 %   11/19/20 1911     97 %   11/19/20 1800 99.2 °F (37.3 °C) 95 28 (!) 118/51 95 %   11/19/20 1600  88 29 114/61 95 %       Intake/Output Summary (Last 24 hours) at 11/20/2020 1453  Last data filed at 11/20/2020 0330  Gross per 24 hour   Intake 650 ml   Output 800 ml   Net -150 ml        I had a face to face encounter, and independently examined this patient on 11/20/2020, as outlined below:  PHYSICAL EXAM:  General: WD, WN. Alert, cooperative, no acute distress    EENT:  EOMI. Anicteric sclerae. MMM  Resp:  Diminished breath sounds. No accessory muscle use  CV:  Regular  rhythm,  No edema  GI:  Soft, Non distended, Non tender. +Bowel sounds  Neurologic:  Alert and oriented X 3, normal speech,   Psych:   Good insight. Not anxious nor agitated  Skin:  No rashes.   No jaundice    Reviewed most current lab test results and cultures  YES  Reviewed most current radiology test results   YES  Review and summation of old records today    NO  Reviewed patient's current orders and MAR    YES  PMH/SH reviewed - no change compared to H&P  ________________________________________________________________________  Care Plan discussed with:    Comments   Patient x    Family      RN x    Care Manager     Consultant                        Multidiciplinary team rounds were held today with , nursing, pharmacist and clinical coordinator. Patient's plan of care was discussed; medications were reviewed and discharge planning was addressed. ________________________________________________________________________  Total NON critical care TIME:  35   Minutes    Total CRITICAL CARE TIME Spent:   Minutes non procedure based      Comments   >50% of visit spent in counseling and coordination of care x     This includes time during multidisciplinary rounds if indicated above   ________________________________________________________________________  Geofm MD You     Procedures: see electronic medical records for all procedures/Xrays and details which were not copied into this note but were reviewed prior to creation of Plan. LABS:  I reviewed today's most current labs and imaging studies.   Pertinent labs include:  Recent Labs     11/20/20  0000 11/19/20  0107 11/18/20  1213   WBC 6.5 7.6 8.4   HGB 12.8 12.8 13.9   HCT 41.2 44.2 49.3*   * 116* 157     Recent Labs     11/20/20  0000 11/19/20  0107 11/18/20  1236 11/18/20  1213     --   --  141   K 3.8  --   --  4.8   CL 94*  --   --  89*   CO2 40*  --   --  >45*   *  --   --  140*   BUN 30*  --   --  22*   CREA 0.68  --   --  0.57   CA 9.8  --   --  10.0   MG 2.2  --  2.4  --    PHOS 2.6  --   --   --    ALB 2.6*  --   --  2.9*   TBILI 0.6  --   --  0.5   ALT 33  --   --  40   INR 1.2* 1.1 1.0  --

## 2020-11-20 NOTE — CONSULTS
[address] [facility]     Date of  Admission: 11/18/2020 12:00 PM     Admission type:Emergency    Consult for: aflutter with RVR  Consult by:hospitalist     Subjective: Arpita Ross is a 63 y.o. female admitted for Acute respiratory failure ([address] [J96.00]. Admitted with acute respiratory distress, hypoxia, hypercapnea. Recent cardiac cath on 11/3/2020 with nonobstructive disease. On admission in , last evening converted to aflutter with RVR, rapid response called. She received IV BB 10mg, amio bolus and started on IV dilt at 10mg. Minimal slowing. This AM rate up to 160sbpm.  Patient c/o only of mild SOB, can feel palpitations, denies CP, dizziness/lightheadedness. The patient is bed bound, does not get out of bed. PMH:  PAF, chronic hypercapneia, hypoxia, untreated JOSLYN, HTN,     Previous treatment/evaluation includes echocardiogram and cardiac catheterization .  Cardiac risk factors: smoking/ tobacco exposure, family history, dyslipidemia, obesity, sedentary life style, hypertension, stress, post-menopausal.      Patient Active Problem List    Diagnosis Date Noted    Atrial flutter with rapid ventricular response ([address] 11/20/2020    JOSLYN (obstructive sleep apnea) 11/20/2020    Acute on chronic respiratory failure with hypoxia and hypercapnia ([address] 11/20/2020    Acute respiratory failure ([address] 11/18/2020    Mixed hyperlipidemia 11/15/2020    Morbid obesity ([address] 11/15/2020    S/P cardiac cath 11/04/2020    COVID-19 ruled out 07/24/2020      Oxana Srivastava NP  Past Medical History:   Diagnosis Date    Acute on chronic respiratory failure with hypoxia and hypercapnia ([address] 11/20/2020    Atrial fibrillation ([address] 11/4/2020    CHF (congestive heart failure) ([address]     Diabetes ([address]     Hypertension     Ill-defined condition     high cholesterol    Ill-defined condition     tachycardia    JOSLYN (obstructive sleep apnea) 11/20/2020    Other and unspecified symptoms and signs involving general sensations and perceptions     ruptured disc    S/P cardiac cath 11/4/2020    11/3/2020 nonobstructive disease      Social History     Socioeconomic History    Marital status: SINGLE     Spouse name: Not on file    Number of children: Not on file    Years of education: Not on file    Highest education level: Not on file   Tobacco Use    Smoking status: Former Smoker    Smokeless tobacco: Never Used   Substance and Sexual Activity    Alcohol use: No    Drug use: No     Allergies   Allergen Reactions    Cefazolin Rash    Other Plant, Animal, Environmental Rash     Ivory soap caused rash on hands. No family history on file.    Current Facility-Administered Medications   Medication Dose Route Frequency    dilTIAZem (CARDIZEM) 100 mg in dextrose 5% (MBP/ADV) 100 mL infusion  0-15 mg/hr IntraVENous TITRATE    metoprolol (LOPRESSOR) injection 2.5 mg  2.5 mg IntraVENous Q1H    amiodarone (CORDARONE) 375 mg/250 mL D5W infusion  1 mg/min IntraVENous CONTINUOUS    Followed by   Mae amiodarone (CORDARONE) 375 mg/250 mL D5W infusion  0.5 mg/min IntraVENous CONTINUOUS    miconazole (SECURA) 2 % extra thick cream   Topical BID    aspirin chewable tablet 81 mg  81 mg Oral DAILY    [Held by provider] atorvastatin (LIPITOR) tablet 10 mg  10 mg Oral QHS    [Held by provider] furosemide (LASIX) tablet 40 mg  40 mg Oral DAILY    zinc oxide-cod liver oil (DESITIN) 40 % paste 1 g  1 g Topical TID    [Held by provider] metoprolol tartrate (LOPRESSOR) tablet 25 mg  25 mg Oral BID    sodium chloride (NS) flush 5-40 mL  5-40 mL IntraVENous Q8H    sodium chloride (NS) flush 5-40 mL  5-40 mL IntraVENous PRN    acetaminophen (TYLENOL) tablet 650 mg  650 mg Oral Q6H PRN    Or    acetaminophen (TYLENOL) suppository 650 mg  650 mg Rectal Q6H PRN    polyethylene glycol (MIRALAX) packet 17 g  17 g Oral DAILY PRN    promethazine (PHENERGAN) tablet 12.5 mg  12.5 mg Oral Q6H PRN    Or    ondansetron (ZOFRAN) injection 4 mg  4 mg IntraVENous Q6H PRN    enoxaparin (LOVENOX) injection 40 mg  40 mg SubCUTAneous Q12H    methylPREDNISolone (PF) (SOLU-MEDROL) injection 40 mg  40 mg IntraVENous Q6H    [Held by provider] furosemide (LASIX) injection 40 mg  40 mg IntraVENous DAILY    metoprolol (LOPRESSOR) injection 5 mg  5 mg IntraVENous Q6H PRN    levoFLOXacin (LEVAQUIN) 750 mg in D5W IVPB  750 mg IntraVENous Q24H    acetaZOLAMIDE (DIAMOX) 500 mg in sterile water (preservative free) 5 mL injection  500 mg IntraVENous Q24H    insulin lispro (HUMALOG) injection   SubCUTAneous Q6H    glucose chewable tablet 16 g  4 Tab Oral PRN    dextrose (D50W) injection syrg 12.5-25 g  12.5-25 g IntraVENous PRN    glucagon (GLUCAGEN) injection 1 mg  1 mg IntraMUSCular PRN    albuterol-ipratropium (DUO-NEB) 2.5 MG-0.5 MG/3 ML  3 mL Nebulization Q6H RT        Review of Symptoms: with patient and review of chart  11 systems reviewed, negative other than as stated in the HPI        Objective:      Visit Vitals  BP (!) 129/94   Pulse 96   Temp 98.6 °F (37 °C)   Resp 23   Ht 5' 3\" (1.6 m)   Wt 308 lb 8 oz (139.9 kg)   SpO2 93%   BMI 54.65 kg/m²       Physical:   General: morbidly obese, disheveled  female in no acute distress  Heart: irr, irr, +m no S3/JVD, no carotid bruits   Lungs: diminished  Abdomen: Soft, +BS, NTND   Extremities: LE rachele +DP/PT, no edema   Neurologic: Grossly normal    Data Review:   Recent Labs     11/20/20  0000 11/19/20  0107 11/18/20  1213   WBC 6.5 7.6 8.4   HGB 12.8 12.8 13.9   HCT 41.2 44.2 49.3*   * 116* 157     Recent Labs     11/20/20  0000 11/19/20  0107 11/18/20  1236 11/18/20  1213     --   --  141   K 3.8  --   --  4.8   CL 94*  --   --  89*   CO2 40*  --   --  >45*   *  --   --  140*   BUN 30*  --   --  22*   CREA 0.68  --   --  0.57   CA 9.8  --   --  10.0   MG 2.2  --  2.4  --    PHOS 2.6  --   --   --    ALB 2.6*  --   --  2.9*   TBILI 0.6  --   --  0.5   ALT 33  --   --  40   INR 1.2* 1.1 1.0  --        Recent Labs     20  1213   TROIQ <0.05     Results for Phi Hopkins (MRN [id number]) as of 2020 12:09   Ref. Range 2020 16:39   pH (POC) Latest Ref Range: 7.35 - 7.45   7.23 (LL)   pCO2 (POC) Latest Ref Range: 35.0 - 45.0 MMHG >90.0 (H)   pO2 (POC) Latest Ref Range: 80 - 100 MMHG 74 (L)   FIO2 (POC) Latest Units: % 50   Specimen type (POC) Latest Units:   ARTERIAL   Site Latest Units:   RIGHT RADIAL   Device: Latest Units:   BIPAP   Total resp. rate Latest Units:   21   PIP (POC) Latest Units:   20   PEEP/CPAP (POC) Latest Units: cmH2O 6   Allens test (POC) Latest Units:   YES         Intake/Output Summary (Last 24 hours) at 2020 1218  Last data filed at 2020 0330  Gross per 24 hour   Intake 650 ml   Output 800 ml   Net -150 ml        Cardiographics    Telemetry: aflutter with RVR  EC2020 SR    Echocardiogram:   · LV: Normal cavity size and systolic function (ejection fraction normal). Mild concentric hypertrophy. Estimated left ventricular ejection fraction is 50 - 55%. Visually measured ejection fraction. CXRAY: Improved aeration with decreasing interstitial and airspace opacities       Assessment:       Principal Problem:    Acute on chronic respiratory failure with hypoxia and hypercapnia (HCC) (2020)    Active Problems:    Mixed hyperlipidemia (11/15/2020)      Morbid obesity ([address] (11/15/2020)      Acute respiratory failure (HCC) (2020)      Atrial flutter with rapid ventricular response ([address] (2020)      JOSLYN (obstructive sleep apnea) (2020)         Plan:     Beauty Gearing with RVR and PAF:  Uncontrolled on IV Dilt 10, giving IV metoprolol 2.5mg Q1H x 3, start IV amio with bolus then infusion  Dr. Prachi Pérez consulted for aflutter ablation   Continue on Lovenox. CHADS2 vasc scores: 2. Patient was started on Eliquis in Aug 2020, but stopped as she could not afford.    Warfarin was considered, but did not feel patient was a good candidate for warfarin due to questionable compliance, and frequent monitoring. Continues on ASA. Acute on chronic hypoxia and hypercapneia  Noncompliance with CPAP, does not want and can not afford  Other options? May be contributing to her arrhythmias as well.      Thank you for consulting [facility]        had been on NOAC but stopped as she could not afford, and  Jessy Ganser, NP

## 2020-11-20 NOTE — CONSULTS
Full note to follow. A flutter with RVR despite IV dilt.   BP doesn't allow much additional.  Rec ablation; will discuss with EP

## 2020-11-20 NOTE — CONSULTS
[address], [address] [facility]  [phone]        Date of  Admission: 11/18/2020 12:00 PM     Admission type:Emergency    Consult for: Acute respiratory failure ([address] [J96.00]  Consult by: Dr Kiana Mcclendon NP     Subjective: Mauricio Krishna is a 63 y.o. female admitted for Acute respiratory failure ([address] [J96.00]. She has pmhx of JOSLYN, COPD, diastolic heart failure, obesity, diabetes mellitus type 2, paroxysmal A. Fib and normal coronaries per cath this month. Pt presented to ED with AMS in sinus, going in to AFL RVR. Her rate is currently improving with IV dilt. Patient complains of  dyspnea, syncope, lower extremity edema. Previous treatment/evaluation includes echocardiogram and cardiac catheterization .      Patient Active Problem List    Diagnosis Date Noted    Typical atrial flutter ([address] 11/20/2020    Acute respiratory failure ([address] 11/18/2020    Mixed hyperlipidemia 11/15/2020    Morbid obesity ([address] 11/15/2020    S/P cardiac cath 11/04/2020    COVID-19 ruled out 07/24/2020      Tico Johnson NP  Past Medical History:   Diagnosis Date    Atrial fibrillation ([address] 11/4/2020    CHF (congestive heart failure) ([address]     Diabetes ([facility]     Hypertension     Ill-defined condition     high cholesterol    Ill-defined condition     tachycardia    Other and unspecified symptoms and signs involving general sensations and perceptions     ruptured disc    S/P cardiac cath 11/4/2020    11/3/2020 nonobstructive disease      Social History     Socioeconomic History    Marital status: SINGLE     Spouse name: Not on file    Number of children: Not on file    Years of education: Not on file    Highest education level: Not on file   Tobacco Use    Smoking status: Former Smoker    Smokeless tobacco: Never Used   Substance and Sexual Activity    Alcohol use: No    Drug use: No     Allergies   Allergen Reactions    Cefazolin Rash    Other Plant, Animal, Environmental Rash     Ivory soap caused rash on hands. No family history on file.    Current Facility-Administered Medications   Medication Dose Route Frequency    dilTIAZem (CARDIZEM) 100 mg in dextrose 5% (MBP/ADV) 100 mL infusion  0-15 mg/hr IntraVENous TITRATE    metoprolol (LOPRESSOR) injection 2.5 mg  2.5 mg IntraVENous Q1H    amiodarone (CORDARONE) 375 mg/250 mL D5W infusion  1 mg/min IntraVENous CONTINUOUS    Followed by    [facility] amiodarone (CORDARONE) 375 mg/250 mL D5W infusion  0.5 mg/min IntraVENous CONTINUOUS    miconazole (SECURA) 2 % extra thick cream   Topical BID    aspirin chewable tablet 81 mg  81 mg Oral DAILY    [Held by provider] atorvastatin (LIPITOR) tablet 10 mg  10 mg Oral QHS    [Held by provider] furosemide (LASIX) tablet 40 mg  40 mg Oral DAILY    zinc oxide-cod liver oil (DESITIN) 40 % paste 1 g  1 g Topical TID    [Held by provider] metoprolol tartrate (LOPRESSOR) tablet 25 mg  25 mg Oral BID    sodium chloride (NS) flush 5-40 mL  5-40 mL IntraVENous Q8H    sodium chloride (NS) flush 5-40 mL  5-40 mL IntraVENous PRN    acetaminophen (TYLENOL) tablet 650 mg  650 mg Oral Q6H PRN    Or    acetaminophen (TYLENOL) suppository 650 mg  650 mg Rectal Q6H PRN    polyethylene glycol (MIRALAX) packet 17 g  17 g Oral DAILY PRN    promethazine (PHENERGAN) tablet 12.5 mg  12.5 mg Oral Q6H PRN    Or    ondansetron (ZOFRAN) injection 4 mg  4 mg IntraVENous Q6H PRN    enoxaparin (LOVENOX) injection 40 mg  40 mg SubCUTAneous Q12H    methylPREDNISolone (PF) (SOLU-MEDROL) injection 40 mg  40 mg IntraVENous Q6H    [Held by provider] furosemide (LASIX) injection 40 mg  40 mg IntraVENous DAILY    metoprolol (LOPRESSOR) injection 5 mg  5 mg IntraVENous Q6H PRN    levoFLOXacin (LEVAQUIN) 750 mg in D5W IVPB  750 mg IntraVENous Q24H    acetaZOLAMIDE (DIAMOX) 500 mg in sterile water (preservative free) 5 mL injection  500 mg IntraVENous Q24H    insulin lispro (HUMALOG) injection SubCUTAneous Q6H    glucose chewable tablet 16 g  4 Tab Oral PRN    dextrose (D50W) injection syrg 12.5-25 g  12.5-25 g IntraVENous PRN    glucagon (GLUCAGEN) injection 1 mg  1 mg IntraMUSCular PRN    albuterol-ipratropium (DUO-NEB) 2.5 MG-0.5 MG/3 ML  3 mL Nebulization Q6H RT         Review of Symptoms:  Constitutional: negative  Eyes: negative  Ears, nose, mouth, throat, and face: negative  Respiratory: dyspnea  Cardiovascular: edema    Genitourinary:negative  Musculoskeletal:negative  Neurological: negative  Endocrine: negative     Subjective:      Visit Vitals  BP (!) 129/94   Pulse 96   Temp 98.6 °F (37 °C)   Resp 23   Ht 5' 3\" (1.6 m)   Wt 308 lb 8 oz (139.9 kg)   SpO2 93%   BMI 54.65 kg/m²       Physical:    General: WD, WN. Alert, cooperative, obese  Heart: irreg irreg, S1 WNL and S2 WNL.  No S3 or S4, no m/S3/JVD, no carotid bruits   Lungs: clear   Abdomen: Soft, +BS, NTND   Extremities: LE rachele +DP/PT, + edema   Neurologic: Grossly normal    Data Review:   Recent Labs     11/20/20  0000 11/19/20  0107 11/18/20  1213   WBC 6.5 7.6 8.4   HGB 12.8 12.8 13.9   HCT 41.2 44.2 49.3*   * 116* 157     Recent Labs     11/20/20  0000 11/19/20  0107 11/18/20  1236 11/18/20  1213     --   --  141   K 3.8  --   --  4.8   CL 94*  --   --  89*   CO2 40*  --   --  >45*   *  --   --  140*   BUN 30*  --   --  22*   CREA 0.68  --   --  0.57   CA 9.8  --   --  10.0   MG 2.2  --  2.4  --    PHOS 2.6  --   --   --    ALB 2.6*  --   --  2.9*   TBILI 0.6  --   --  0.5   ALT 33  --   --  40   INR 1.2* 1.1 1.0  --        Recent Labs     11/18/20  1213   TROIQ <0.05         Intake/Output Summary (Last 24 hours) at 11/20/2020 1216  Last data filed at 11/20/2020 0330  Gross per 24 hour   Intake 650 ml   Output 800 ml   Net -150 ml        Cardiographics    Telemetry:   -130s, down to 90s at this time    ECG:   SR 69    Echocardiogram:   · LV: Normal cavity size and systolic function (ejection fraction normal). Mild concentric hypertrophy. Estimated left ventricular ejection fraction is 50 - 55%. Visually measured ejection fraction. [facility]: 11/3/2020  · Non obstructive CAD. · Normal LVEF. Assessment:          Active Problems:    Mixed hyperlipidemia (11/15/2020)      Acute respiratory failure ([facility] (11/18/2020)      Typical atrial flutter ([facility] (11/20/2020)         Plan:     Priya Lobato is a pleasant 63year old female with HTN, DM, AF/AFL with normal coronaries and Acute on chronic hypoxemic (3-4 l at baseline), hypercapnic respiratory failure. She is currently in rate controlled AFL. Continue with IV dilt x 24 hours. If rate controlled, may change to PO this weekend. Would benefit from maintenance of sinus rhythm. She is a candidate for TINA/AFL ablation. I discussed the risks/benefits/alternatives of the procedure with the patient. Risks include (but are not limited to) bleeding, infection, cva/mi/death. The patient understands and would like to proceed on  Monday. Thank you for this interesting consultation. Danielle Joseph ANP    Patient seen and examined by me with nurse practitioner. I personally performed all components of the history, physical, and medical decision making and agree with the assessment and plan with minor modifications as noted. Pt in afl. Rate is better controlled now. Cont dilt and oac. Candidate for tina/afl abl on Monday. I discussed the risks/benefits/alternatives of the procedure with the patient. Risks include (but are not limited to) bleeding, heart block, infection, cva/mi/tamponade/death. The patient understands and agrees to proceed. Thank you for this interesting consultation.       Desirae Thomas MD, [facility]

## 2020-11-21 LAB
ANION GAP SERPL CALC-SCNC: 2 MMOL/L (ref 5–15)
BASOPHILS # BLD: 0 K/UL (ref 0–0.1)
BASOPHILS NFR BLD: 0 % (ref 0–1)
BUN SERPL-MCNC: 39 MG/DL (ref 6–20)
BUN/CREAT SERPL: 65 (ref 12–20)
CALCIUM SERPL-MCNC: 9.7 MG/DL (ref 8.5–10.1)
CHLORIDE SERPL-SCNC: 95 MMOL/L (ref 97–108)
CO2 SERPL-SCNC: 38 MMOL/L (ref 21–32)
CREAT SERPL-MCNC: 0.6 MG/DL (ref 0.55–1.02)
D DIMER PPP FEU-MCNC: 0.63 MG/L FEU (ref 0–0.65)
DIFFERENTIAL METHOD BLD: ABNORMAL
EOSINOPHIL # BLD: 0 K/UL (ref 0–0.4)
EOSINOPHIL NFR BLD: 0 % (ref 0–7)
ERYTHROCYTE [DISTWIDTH] IN BLOOD BY AUTOMATED COUNT: 13.2 % (ref 11.5–14.5)
FIBRINOGEN PPP-MCNC: 368 MG/DL (ref 200–475)
GLUCOSE BLD STRIP.AUTO-MCNC: 117 MG/DL (ref 65–100)
GLUCOSE BLD STRIP.AUTO-MCNC: 181 MG/DL (ref 65–100)
GLUCOSE BLD STRIP.AUTO-MCNC: 214 MG/DL (ref 65–100)
GLUCOSE BLD STRIP.AUTO-MCNC: 228 MG/DL (ref 65–100)
GLUCOSE SERPL-MCNC: 163 MG/DL (ref 65–100)
HCT VFR BLD AUTO: 43.5 % (ref 35–47)
HGB BLD-MCNC: 13.4 G/DL (ref 11.5–16)
IMM GRANULOCYTES # BLD AUTO: 0.1 K/UL (ref 0–0.04)
IMM GRANULOCYTES NFR BLD AUTO: 1 % (ref 0–0.5)
INR PPP: 1.3 (ref 0.9–1.1)
LYMPHOCYTES # BLD: 0.6 K/UL (ref 0.8–3.5)
LYMPHOCYTES NFR BLD: 8 % (ref 12–49)
MCH RBC QN AUTO: 30.9 PG (ref 26–34)
MCHC RBC AUTO-ENTMCNC: 30.8 G/DL (ref 30–36.5)
MCV RBC AUTO: 100.5 FL (ref 80–99)
MONOCYTES # BLD: 0.3 K/UL (ref 0–1)
MONOCYTES NFR BLD: 4 % (ref 5–13)
NEUTS SEG # BLD: 6.7 K/UL (ref 1.8–8)
NEUTS SEG NFR BLD: 88 % (ref 32–75)
NRBC # BLD: 0 K/UL (ref 0–0.01)
NRBC BLD-RTO: 0 PER 100 WBC
PLATELET # BLD AUTO: 133 K/UL (ref 150–400)
PMV BLD AUTO: 12.6 FL (ref 8.9–12.9)
POTASSIUM SERPL-SCNC: 3.7 MMOL/L (ref 3.5–5.1)
PROTHROMBIN TIME: 13.5 SEC (ref 9–11.1)
RBC # BLD AUTO: 4.33 M/UL (ref 3.8–5.2)
SERVICE CMNT-IMP: ABNORMAL
SODIUM SERPL-SCNC: 135 MMOL/L (ref 136–145)
WBC # BLD AUTO: 7.6 K/UL (ref 3.6–11)

## 2020-11-21 PROCEDURE — 3331090002 HH PPS REVENUE DEBIT

## 2020-11-21 PROCEDURE — 74011250637 HC RX REV CODE- 250/637: Performed by: INTERNAL MEDICINE

## 2020-11-21 PROCEDURE — 74011250636 HC RX REV CODE- 250/636: Performed by: INTERNAL MEDICINE

## 2020-11-21 PROCEDURE — 65660000000 HC RM CCU STEPDOWN

## 2020-11-21 PROCEDURE — 85384 FIBRINOGEN ACTIVITY: CPT

## 2020-11-21 PROCEDURE — 77010033678 HC OXYGEN DAILY

## 2020-11-21 PROCEDURE — 3331090001 HH PPS REVENUE CREDIT

## 2020-11-21 PROCEDURE — 74011000258 HC RX REV CODE- 258: Performed by: NURSE PRACTITIONER

## 2020-11-21 PROCEDURE — 74011636637 HC RX REV CODE- 636/637: Performed by: INTERNAL MEDICINE

## 2020-11-21 PROCEDURE — 36415 COLL VENOUS BLD VENIPUNCTURE: CPT

## 2020-11-21 PROCEDURE — 85610 PROTHROMBIN TIME: CPT

## 2020-11-21 PROCEDURE — 74011000250 HC RX REV CODE- 250: Performed by: NURSE PRACTITIONER

## 2020-11-21 PROCEDURE — 85025 COMPLETE CBC W/AUTO DIFF WBC: CPT

## 2020-11-21 PROCEDURE — 85379 FIBRIN DEGRADATION QUANT: CPT

## 2020-11-21 PROCEDURE — 82962 GLUCOSE BLOOD TEST: CPT

## 2020-11-21 PROCEDURE — 80048 BASIC METABOLIC PNL TOTAL CA: CPT

## 2020-11-21 PROCEDURE — 74011250636 HC RX REV CODE- 250/636: Performed by: NURSE PRACTITIONER

## 2020-11-21 PROCEDURE — 99233 SBSQ HOSP IP/OBS HIGH 50: CPT | Performed by: INTERNAL MEDICINE

## 2020-11-21 RX ORDER — FUROSEMIDE 10 MG/ML
40 INJECTION INTRAMUSCULAR; INTRAVENOUS DAILY
Status: DISCONTINUED | OUTPATIENT
Start: 2020-11-21 | End: 2020-11-22

## 2020-11-21 RX ORDER — AMIODARONE HYDROCHLORIDE 200 MG/1
400 TABLET ORAL 3 TIMES DAILY
Status: DISPENSED | OUTPATIENT
Start: 2020-11-21 | End: 2020-11-24

## 2020-11-21 RX ORDER — DILTIAZEM HYDROCHLORIDE 30 MG/1
60 TABLET, FILM COATED ORAL
Status: DISCONTINUED | OUTPATIENT
Start: 2020-11-21 | End: 2020-11-24

## 2020-11-21 RX ADMIN — Medication 1 G: at 17:22

## 2020-11-21 RX ADMIN — MICONAZOLE NITRATE: 20 CREAM TOPICAL at 09:16

## 2020-11-21 RX ADMIN — DILTIAZEM HYDROCHLORIDE 60 MG: 60 TABLET, FILM COATED ORAL at 13:35

## 2020-11-21 RX ADMIN — DEXTROSE MONOHYDRATE 15 MG/HR: 50 INJECTION, SOLUTION INTRAVENOUS at 12:37

## 2020-11-21 RX ADMIN — INSULIN LISPRO 3 UNITS: 100 INJECTION, SOLUTION INTRAVENOUS; SUBCUTANEOUS at 17:22

## 2020-11-21 RX ADMIN — Medication 1 G: at 09:16

## 2020-11-21 RX ADMIN — DILTIAZEM HYDROCHLORIDE 60 MG: 60 TABLET, FILM COATED ORAL at 21:24

## 2020-11-21 RX ADMIN — AMIODARONE HYDROCHLORIDE 400 MG: 200 TABLET ORAL at 13:35

## 2020-11-21 RX ADMIN — MICONAZOLE NITRATE: 20 CREAM TOPICAL at 17:22

## 2020-11-21 RX ADMIN — INSULIN LISPRO 3 UNITS: 100 INJECTION, SOLUTION INTRAVENOUS; SUBCUTANEOUS at 13:29

## 2020-11-21 RX ADMIN — METHYLPREDNISOLONE SODIUM SUCCINATE 40 MG: 40 INJECTION, POWDER, FOR SOLUTION INTRAMUSCULAR; INTRAVENOUS at 21:24

## 2020-11-21 RX ADMIN — AMIODARONE HYDROCHLORIDE 400 MG: 200 TABLET ORAL at 17:22

## 2020-11-21 RX ADMIN — LEVOFLOXACIN 750 MG: 5 INJECTION, SOLUTION INTRAVENOUS at 17:19

## 2020-11-21 RX ADMIN — ATORVASTATIN CALCIUM 10 MG: 10 TABLET, FILM COATED ORAL at 21:24

## 2020-11-21 RX ADMIN — DEXTROSE MONOHYDRATE 15 MG/HR: 50 INJECTION, SOLUTION INTRAVENOUS at 05:19

## 2020-11-21 RX ADMIN — DILTIAZEM HYDROCHLORIDE 60 MG: 60 TABLET, FILM COATED ORAL at 17:22

## 2020-11-21 RX ADMIN — Medication 1 G: at 21:25

## 2020-11-21 RX ADMIN — AMIODARONE HYDROCHLORIDE 400 MG: 200 TABLET ORAL at 21:24

## 2020-11-21 RX ADMIN — RIVAROXABAN 20 MG: 20 TABLET, FILM COATED ORAL at 17:22

## 2020-11-21 RX ADMIN — AMIODARONE HYDROCHLORIDE 0.5 MG/MIN: 50 INJECTION, SOLUTION INTRAVENOUS at 07:22

## 2020-11-21 RX ADMIN — INSULIN LISPRO 2 UNITS: 100 INJECTION, SOLUTION INTRAVENOUS; SUBCUTANEOUS at 09:13

## 2020-11-21 RX ADMIN — Medication 10 ML: at 22:00

## 2020-11-21 RX ADMIN — FUROSEMIDE 40 MG: 10 INJECTION, SOLUTION INTRAMUSCULAR; INTRAVENOUS at 11:17

## 2020-11-21 RX ADMIN — ASPIRIN 81 MG CHEWABLE TABLET 81 MG: 81 TABLET CHEWABLE at 09:19

## 2020-11-21 RX ADMIN — Medication 10 ML: at 05:23

## 2020-11-21 RX ADMIN — METHYLPREDNISOLONE SODIUM SUCCINATE 40 MG: 40 INJECTION, POWDER, FOR SOLUTION INTRAMUSCULAR; INTRAVENOUS at 05:23

## 2020-11-21 NOTE — PROGRESS NOTES
Problem: Falls - Risk of  Goal: *Absence of Falls  Description: Document Rahel Sears Fall Risk and appropriate interventions in the flowsheet. Outcome: Progressing Towards Goal  Note: Fall Risk Interventions:       Mentation Interventions: Bed/chair exit alarm, Adequate sleep, hydration, pain control    Medication Interventions: Bed/chair exit alarm    Elimination Interventions: Call light in reach    History of Falls Interventions: Bed/chair exit alarm, Room close to nurse's station         Problem: Pressure Injury - Risk of  Goal: *Prevention of pressure injury  Description: Document Gaston Scale and appropriate interventions in the flowsheet.   Outcome: Progressing Towards Goal  Note: Pressure Injury Interventions:  Sensory Interventions: Assess changes in LOC, Assess need for specialty bed    Moisture Interventions: Absorbent underpads, Apply protective barrier, creams and emollients    Activity Interventions: Assess need for specialty bed, Increase time out of bed    Mobility Interventions: Assess need for specialty bed, HOB 30 degrees or less    Nutrition Interventions: Document food/fluid/supplement intake    Friction and Shear Interventions: Apply protective barrier, creams and emollients, HOB 30 degrees or less

## 2020-11-21 NOTE — ROUTINE PROCESS
0100: 
Bedside and verbal shift change report given to MARIE Crooks (oncoming nurse) by Zeenat Quinteros RN (offgoing nurse). Report included the following information SBAR, Kardex, Intake/Output, MAR, Recent Results, Med Rec Status, Alarm Parameters  and Quality Measures. 0219: 
Shift assessment and VS done. Patient is resting in bed with no sign of distress; continue to monitor. Bedside and verbal shift change report given to MARIE mena (oncoming nurse) by Alexandra Butler RN (offgoing nurse). Report included the following information SBAR, Kardex, Intake/Output, MAR, Recent Results, Med Rec Status, Alarm Parameters  and Quality Measures.

## 2020-11-21 NOTE — PROGRESS NOTES
11/21/2020 12:44 PM    Admit Date: 11/18/2020    Admit Diagnosis:   Acute respiratory failure ([facility] [J96.00]    Subjective: Biju Ramachandran denies chest pain or shortness of breath. Remains in NSR.     Current Facility-Administered Medications   Medication Dose Route Frequency    methylPREDNISolone (PF) (SOLU-MEDROL) injection 40 mg  40 mg IntraVENous Q12H    furosemide (LASIX) injection 40 mg  40 mg IntraVENous DAILY    dilTIAZem (CARDIZEM) 100 mg in dextrose 5% (MBP/ADV) 100 mL infusion  0-15 mg/hr IntraVENous TITRATE    amiodarone (CORDARONE) 375 mg/250 mL D5W infusion  0.5 mg/min IntraVENous CONTINUOUS    rivaroxaban (XARELTO) tablet 20 mg  20 mg Oral DAILY WITH DINNER    insulin lispro (HUMALOG) injection   SubCUTAneous AC&HS    albuterol-ipratropium (DUO-NEB) 2.5 MG-0.5 MG/3 ML  3 mL Nebulization Q6H PRN    miconazole (SECURA) 2 % extra thick cream   Topical BID    aspirin chewable tablet 81 mg  81 mg Oral DAILY    atorvastatin (LIPITOR) tablet 10 mg  10 mg Oral QHS    [Held by provider] furosemide (LASIX) tablet 40 mg  40 mg Oral DAILY    zinc oxide-cod liver oil (DESITIN) 40 % paste 1 g  1 g Topical TID    [Held by provider] metoprolol tartrate (LOPRESSOR) tablet 25 mg  25 mg Oral BID    sodium chloride (NS) flush 5-40 mL  5-40 mL IntraVENous Q8H    sodium chloride (NS) flush 5-40 mL  5-40 mL IntraVENous PRN    acetaminophen (TYLENOL) tablet 650 mg  650 mg Oral Q6H PRN    Or    acetaminophen (TYLENOL) suppository 650 mg  650 mg Rectal Q6H PRN    polyethylene glycol (MIRALAX) packet 17 g  17 g Oral DAILY PRN    promethazine (PHENERGAN) tablet 12.5 mg  12.5 mg Oral Q6H PRN    Or    ondansetron (ZOFRAN) injection 4 mg  4 mg IntraVENous Q6H PRN    metoprolol (LOPRESSOR) injection 5 mg  5 mg IntraVENous Q6H PRN    levoFLOXacin (LEVAQUIN) 750 mg in D5W IVPB  750 mg IntraVENous Q24H    glucose chewable tablet 16 g  4 Tab Oral PRN    dextrose (D50W) injection syrg 12.5-25 g 12.5-25 g IntraVENous PRN    glucagon (GLUCAGEN) injection 1 mg  1 mg IntraMUSCular PRN         Objective:      Physical Exam:    Visit Vitals  BP (!) 100/46   Pulse 80   Temp 98.3 °F (36.8 °C)   Resp 20   Ht 5' 3\" (1.6 m)   Wt 307 lb 4.8 oz (139.4 kg)   SpO2 96%   BMI 54.44 kg/m²     Gen:  NAD  Mental Status - Alert. General Appearance - Not in acute distress. Chest and Lung Exam   Inspection: Accessory muscles - No use of accessory muscles in breathing. Auscultation:   Breath sounds: - Normal.   Cardiovascular   Inspection: Jugular vein - Bilateral - Inspection Normal.   Palpation/Percussion:   Apical Impulse: - Normal.   Auscultation: Rhythm - Regular. Heart Sounds - S1 WNL and S2 WNL. No S3 or S4. Murmurs & Other Heart Sounds: Auscultation of the heart reveals - No Murmurs. Peripheral Vascular   Upper Extremity: Inspection - Bilateral - No Cyanotic nailbeds or Digital clubbing. Lower Extremity:   Palpation: Edema - Bilateral - No edema. Abdomen:   Soft, non-tender, bowel sounds are active. Neuro: A&O times 3, CN and motor grossly WNL    Data Review:   Recent Labs     11/21/20  0333 11/20/20  0000 11/19/20  0107   WBC 7.6 6.5 7.6   HGB 13.4 12.8 12.8   HCT 43.5 41.2 44.2   * 130* 116*     Recent Labs     11/21/20  0333 11/20/20  0000 11/19/20  0107   * 136  --    K 3.7 3.8  --    CL 95* 94*  --    CO2 38* 40*  --    * 136*  --    BUN 39* 30*  --    CREA 0.60 0.68  --    CA 9.7 9.8  --    MG  --  2.2  --    PHOS  --  2.6  --    ALB  --  2.6*  --    TBILI  --  0.6  --    ALT  --  33  --    INR 1.3* 1.2* 1.1       No results for input(s): TROIQ, CPK, CKMB in the last 72 hours.       Intake/Output Summary (Last 24 hours) at 11/21/2020 1247  Last data filed at 11/20/2020 1848  Gross per 24 hour   Intake 630 ml   Output 750 ml   Net -120 ml        Telemetry: NSR      Assessment:     Principal Problem:    Acute on chronic respiratory failure with hypoxia and hypercapnia (HCC) (11/20/2020)    Active Problems:    Mixed hyperlipidemia (11/15/2020)      Morbid obesity ([facility] (11/15/2020)      Acute respiratory failure ([facility] (11/18/2020)      Atrial flutter with rapid ventricular response [facility] (11/20/2020)      JOSLYN (obstructive sleep apnea) (11/20/2020)        Plan:     Orvil Salaam (likely some atrial fibrillation as well) with RVR and PAF:  Back in NSR on IV diltiazem and IV amiodarone  Convert both medications to p.o. today, 11/21/2020  Dr. [facility] consulted for aflutter ablationplan for Monday  Continue on Xarelto  CHADS2 vasc score: 2    Acute on chronic hypoxia and hypercapneia  Noncompliance with CPAP, does not want and can not afford  Other options? May be contributing to her arrhythmias as well.

## 2020-11-21 NOTE — PROGRESS NOTES
Bedside shift change report given to Hari Rizzo RN (oncoming nurse) by  Jose Miguel palacios. Report included the following information SBAR, Kardex, Intake/Output, MAR, Accordion, Recent Results, Med Rec Status, Cardiac Rhythm A-Flutter, Alarm Parameters , Pre Procedure Checklist, Procedure Verification, Quality Measures and Dual Neuro Assessment.

## 2020-11-21 NOTE — PROGRESS NOTES
Bedside shift change report given to Jordan Moran RN (oncoming nurse) by Marcela Ortiz (offgoing nurse). Report included the following information SBAR, Kardex, Intake/Output, MAR, Accordion, Recent Results, Med Rec Status, Cardiac Rhythm A-Flutter, Alarm Parameters , Pre Procedure Checklist, Procedure Verification, Quality Measures and Dual Neuro Assessment.

## 2020-11-21 NOTE — PROGRESS NOTES
Hospitalist Progress Note    NAME: Brenda Ng   :  1960   MRN:  [id number]     Interim Hospital Summary: 63 y.o. female whom presented on 2020 with      Assessment / Plan:    Acute on chronic hypoxic and hypercapnic respiratory failure  Acute metabolic encephalopathy, resolved  Suspect COPD exacerbation (unknown FEV1)  Hx Tobacco abuse  -CXR There is increasing interstitial and vague airspace opacities in the lungs may represent edema versus atypical pneumonia. .. Cardiomegaly appears stable allowing for differences in technique  -SARS COV2 rapid NEG, PCR NEG  -baseline 3-4L oxgyen  -mental status improved with BIPAP support - use at HS prn  -continue antibiotics. PCT 0.06  -taper steroids.  -diamox prn. Serum C02 has come down. Restart lasix daily  -appreciate pulmonary input. Try get bedside PFTs prior to discharge. Ideally should get outpatient sleep study but patient does not walk or get out of there house.    -can transfer out of covid unit    Afib RVR  -not on anticoagulation as an outpatient due to compliance issues, cont ASA  -now on cardizem infusion with addition of amiodarone   -cardiology planning YANNA/AFL ablation on Monday    DM2  -SSI prn    40 or above Morbid obesity / Body mass index is 54.44 kg/m². Code status: Full  Prophylaxis: Lovenox  Recommended Disposition: Home w/Family       Subjective:     Chief Complaint / Reason for Physician Visit  Follow up of AMS, COPD, OHS, Afib and encephalopathy  Chart reviewed in detail. Discussed with RN events overnight.    Leg swelling about same    Review of Systems:  Symptom Y/N Comments  Symptom Y/N Comments   Fever/Chills    Chest Pain     Poor Appetite    Edema y    Cough    Abdominal Pain     Sputum    Joint Pain     SOB/CHOPRA   improved  Pruritis/Rash     Nausea/vomit    Tolerating PT/OT     Diarrhea    Tolerating Diet     Constipation    Other       Could NOT obtain due to: PO intake:   Patient Vitals for the past 72 hrs:   % Diet Eaten   11/20/20 1445 100 %   11/20/20 1315 100 %   11/20/20 1100 100 %     Objective:     VITALS:   Last 24hrs VS reviewed since prior progress note. Most recent are:  Patient Vitals for the past 24 hrs:   Temp Pulse Resp BP SpO2   11/21/20 0400  98      11/21/20 0219 97.6 °F (36.4 °C) (!) 101 26 123/66 96 %   11/20/20 2359  (!) 120  (!) 136/59    11/20/20 2300  (!) 113  118/60 97 %   11/20/20 2245 98.2 °F (36.8 °C) (!) 109 26 132/67 97 %   11/20/20 2230  83  (!) 126/54 96 %   11/20/20 2224     97 %   11/20/20 2216  (!) 102  (!) 147/127 91 %   11/20/20 2202  (!) 128  116/79 95 %   11/20/20 2145  (!) 120  124/82 96 %   11/20/20 2130  (!) 102 (!) 31 (!) 108/58 94 %   11/20/20 2115  81 (!) 39 (!) 115/98 96 %   11/20/20 2100  81 (!) 36 112/78 96 %   11/20/20 2045  80 (!) 40 112/80 96 %   11/20/20 2030  (!) 127 24 (!) 126/56 95 %   11/20/20 2015  (!) 112 24 117/65 95 %   11/20/20 2006  (!) 157   93 %   11/20/20 2005    102/62 96 %   11/20/20 2004 98.1 °F (36.7 °C) (!) 106 25 102/62 96 %   11/20/20 1948  (!) 120 30     11/20/20 1449  81 21  95 %   11/20/20 1448  81 22  96 %   11/20/20 1445  82 (!) 36 (!) 118/97    11/20/20 1426 98.6 °F (37 °C) (!) 117 (!) 37 129/68    11/20/20 1419  (!) 119 19 129/68    11/20/20 1330   28 107/61    11/20/20 1315  82 (!) 41 103/73    11/20/20 1200  96 23 (!) 129/94 93 %   11/20/20 1145  (!) 104 (!) 36 (!) 126/101 93 %   11/20/20 1100 98.6 °F (37 °C) (!) 160 (!) 38 110/71 90 %       Intake/Output Summary (Last 24 hours) at 11/21/2020 5525  Last data filed at 11/20/2020 7425  Gross per 24 hour   Intake 870 ml   Output 750 ml   Net 120 ml        I had a face to face encounter, and independently examined this patient on 11/21/2020, as outlined below:  PHYSICAL EXAM:  General: WD, WN. Alert, cooperative, no acute distress    EENT:  EOMI. Anicteric sclerae.  MMM  Resp:  Diminished breath sounds. No accessory muscle use  CV:  Irregular  rhythm,  (+) edema  GI:  Soft, Non distended, Non tender. +Bowel sounds  Neurologic:  Alert and oriented X 3, normal speech,   Psych:   Good insight. Not anxious nor agitated  Skin:  No rashes. No jaundice    Reviewed most current lab test results and cultures  YES  Reviewed most current radiology test results   YES  Review and summation of old records today    NO  Reviewed patient's current orders and MAR    YES  PMH/SH reviewed - no change compared to H&P  ________________________________________________________________________  Care Plan discussed with:    Comments   Patient x    Family      RN x    Care Manager     Consultant                        Multidiciplinary team rounds were held today with , nursing, pharmacist and clinical coordinator. Patient's plan of care was discussed; medications were reviewed and discharge planning was addressed. ________________________________________________________________________  Total NON critical care TIME:  25   Minutes    Total CRITICAL CARE TIME Spent:   Minutes non procedure based      Comments   >50% of visit spent in counseling and coordination of care      This includes time during multidisciplinary rounds if indicated above   ________________________________________________________________________  Tee Pham MD     Procedures: see electronic medical records for all procedures/Xrays and details which were not copied into this note but were reviewed prior to creation of Plan. LABS:  I reviewed today's most current labs and imaging studies.   Pertinent labs include:  Recent Labs     11/21/20  0333 11/20/20  0000 11/19/20  0107   WBC 7.6 6.5 7.6   HGB 13.4 12.8 12.8   HCT 43.5 41.2 44.2   * 130* 116*     Recent Labs     11/21/20  0333 11/20/20  0000 11/19/20  0107 11/18/20  1236  11/18/20  1213   * 136  --   --   --  141   K 3.7 3.8  --   --   --  4.8   CL 95* 94*  --   -- --  89*   CO2 38* 40*  --   --   --  >45*   * 136*  --   --   --  140*   BUN 39* 30*  --   --   --  22*   CREA 0.60 0.68  --   --   --  0.57   CA 9.7 9.8  --   --   --  10.0   MG  --  2.2  --  2.4  --   --    PHOS  --  2.6  --   --   --   --    ALB  --  2.6*  --   --   --  2.9*   TBILI  --  0.6  --   --   --  0.5   ALT  --  33  --   --   --  40   INR 1.3* 1.2* 1.1 1.0   < >  --     < > = values in this interval not displayed.

## 2020-11-22 LAB
ANION GAP SERPL CALC-SCNC: 2 MMOL/L (ref 5–15)
BUN SERPL-MCNC: 48 MG/DL (ref 6–20)
BUN/CREAT SERPL: 72 (ref 12–20)
CALCIUM SERPL-MCNC: 9.7 MG/DL (ref 8.5–10.1)
CHLORIDE SERPL-SCNC: 94 MMOL/L (ref 97–108)
CO2 SERPL-SCNC: 42 MMOL/L (ref 21–32)
CREAT SERPL-MCNC: 0.67 MG/DL (ref 0.55–1.02)
D DIMER PPP FEU-MCNC: 0.64 MG/L FEU (ref 0–0.65)
FIBRINOGEN PPP-MCNC: 281 MG/DL (ref 200–475)
GLUCOSE BLD STRIP.AUTO-MCNC: 144 MG/DL (ref 65–100)
GLUCOSE BLD STRIP.AUTO-MCNC: 164 MG/DL (ref 65–100)
GLUCOSE BLD STRIP.AUTO-MCNC: 185 MG/DL (ref 65–100)
GLUCOSE BLD STRIP.AUTO-MCNC: 187 MG/DL (ref 65–100)
GLUCOSE SERPL-MCNC: 215 MG/DL (ref 65–100)
INR PPP: 1.4 (ref 0.9–1.1)
MAGNESIUM SERPL-MCNC: 2.5 MG/DL (ref 1.6–2.4)
POTASSIUM SERPL-SCNC: 3.8 MMOL/L (ref 3.5–5.1)
PROTHROMBIN TIME: 14.9 SEC (ref 9–11.1)
SERVICE CMNT-IMP: ABNORMAL
SODIUM SERPL-SCNC: 138 MMOL/L (ref 136–145)

## 2020-11-22 PROCEDURE — 2709999900 HC NON-CHARGEABLE SUPPLY

## 2020-11-22 PROCEDURE — 99233 SBSQ HOSP IP/OBS HIGH 50: CPT | Performed by: INTERNAL MEDICINE

## 2020-11-22 PROCEDURE — 74011250637 HC RX REV CODE- 250/637: Performed by: INTERNAL MEDICINE

## 2020-11-22 PROCEDURE — 82962 GLUCOSE BLOOD TEST: CPT

## 2020-11-22 PROCEDURE — 83735 ASSAY OF MAGNESIUM: CPT

## 2020-11-22 PROCEDURE — 3331090002 HH PPS REVENUE DEBIT

## 2020-11-22 PROCEDURE — 74011250636 HC RX REV CODE- 250/636: Performed by: INTERNAL MEDICINE

## 2020-11-22 PROCEDURE — 36415 COLL VENOUS BLD VENIPUNCTURE: CPT

## 2020-11-22 PROCEDURE — 85384 FIBRINOGEN ACTIVITY: CPT

## 2020-11-22 PROCEDURE — 94660 CPAP INITIATION&MGMT: CPT

## 2020-11-22 PROCEDURE — 65660000000 HC RM CCU STEPDOWN

## 2020-11-22 PROCEDURE — 85610 PROTHROMBIN TIME: CPT

## 2020-11-22 PROCEDURE — 3331090001 HH PPS REVENUE CREDIT

## 2020-11-22 PROCEDURE — 80048 BASIC METABOLIC PNL TOTAL CA: CPT

## 2020-11-22 PROCEDURE — 74011636637 HC RX REV CODE- 636/637: Performed by: INTERNAL MEDICINE

## 2020-11-22 PROCEDURE — 85379 FIBRIN DEGRADATION QUANT: CPT

## 2020-11-22 RX ORDER — ACETAZOLAMIDE 250 MG/1
250 TABLET ORAL ONCE
Status: COMPLETED | OUTPATIENT
Start: 2020-11-22 | End: 2020-11-22

## 2020-11-22 RX ADMIN — Medication 10 ML: at 06:09

## 2020-11-22 RX ADMIN — Medication 10 ML: at 13:04

## 2020-11-22 RX ADMIN — INSULIN LISPRO 2 UNITS: 100 INJECTION, SOLUTION INTRAVENOUS; SUBCUTANEOUS at 17:05

## 2020-11-22 RX ADMIN — ASPIRIN 81 MG CHEWABLE TABLET 81 MG: 81 TABLET CHEWABLE at 08:57

## 2020-11-22 RX ADMIN — ATORVASTATIN CALCIUM 10 MG: 10 TABLET, FILM COATED ORAL at 21:29

## 2020-11-22 RX ADMIN — MICONAZOLE NITRATE: 20 CREAM TOPICAL at 17:05

## 2020-11-22 RX ADMIN — INSULIN LISPRO 2 UNITS: 100 INJECTION, SOLUTION INTRAVENOUS; SUBCUTANEOUS at 12:18

## 2020-11-22 RX ADMIN — Medication 1 G: at 08:59

## 2020-11-22 RX ADMIN — Medication 1 G: at 21:30

## 2020-11-22 RX ADMIN — METHYLPREDNISOLONE SODIUM SUCCINATE 40 MG: 40 INJECTION, POWDER, FOR SOLUTION INTRAMUSCULAR; INTRAVENOUS at 08:58

## 2020-11-22 RX ADMIN — AMIODARONE HYDROCHLORIDE 400 MG: 200 TABLET ORAL at 21:30

## 2020-11-22 RX ADMIN — DILTIAZEM HYDROCHLORIDE 60 MG: 60 TABLET, FILM COATED ORAL at 17:05

## 2020-11-22 RX ADMIN — AMIODARONE HYDROCHLORIDE 400 MG: 200 TABLET ORAL at 17:04

## 2020-11-22 RX ADMIN — ACETAZOLAMIDE 250 MG: 250 TABLET ORAL at 12:18

## 2020-11-22 RX ADMIN — DILTIAZEM HYDROCHLORIDE 60 MG: 60 TABLET, FILM COATED ORAL at 12:18

## 2020-11-22 RX ADMIN — Medication 1 G: at 17:05

## 2020-11-22 RX ADMIN — Medication 10 ML: at 22:00

## 2020-11-22 RX ADMIN — FUROSEMIDE 40 MG: 10 INJECTION, SOLUTION INTRAMUSCULAR; INTRAVENOUS at 08:58

## 2020-11-22 RX ADMIN — DILTIAZEM HYDROCHLORIDE 60 MG: 60 TABLET, FILM COATED ORAL at 21:30

## 2020-11-22 RX ADMIN — AMIODARONE HYDROCHLORIDE 400 MG: 200 TABLET ORAL at 08:58

## 2020-11-22 RX ADMIN — MICONAZOLE NITRATE: 20 CREAM TOPICAL at 08:59

## 2020-11-22 RX ADMIN — INSULIN LISPRO 2 UNITS: 100 INJECTION, SOLUTION INTRAVENOUS; SUBCUTANEOUS at 08:58

## 2020-11-22 RX ADMIN — LEVOFLOXACIN 750 MG: 5 INJECTION, SOLUTION INTRAVENOUS at 17:05

## 2020-11-22 RX ADMIN — RIVAROXABAN 20 MG: 20 TABLET, FILM COATED ORAL at 17:04

## 2020-11-22 RX ADMIN — DILTIAZEM HYDROCHLORIDE 60 MG: 60 TABLET, FILM COATED ORAL at 08:57

## 2020-11-22 NOTE — PROGRESS NOTES
0700: Bedside shift change report given to Nichelle Andre RN (oncoming nurse) by Dustin Longoria RN (offgoing nurse). Report included the following information SBAR, Kardex, Intake/Output and MAR.    0700: Patient in bed, resting quietly. Call bell in reach, will monitor. 0700: Sallie Abebe will be documenting on patient. 0900: Dr. Vipin Field at bedside assessing patient, will continue current treatment, no new orders placed. 1330: TRANSFER - OUT REPORT:    Verbal report given to Gunjan Geetharamin Irish RN(name) on Dionne Cerrato  being transferred to IVCU(unit) for routine progression of care       Report consisted of patients Situation, Background, Assessment and   Recommendations(SBAR). Information from the following report(s) SBAR, Kardex, Intake/Output, MAR and Recent Results was reviewed with the receiving nurse. Lines:   Peripheral IV 11/19/20 Anterior; Left Hand (Active)   Site Assessment Clean, dry, & intact 11/22/20 1223   Phlebitis Assessment 0 11/22/20 1223   Infiltration Assessment 0 11/22/20 1223   Dressing Status Clean, dry, & intact 11/22/20 1223   Dressing Type Tape;Transparent 11/22/20 1223   Hub Color/Line Status Capped 11/22/20 0747   Action Taken Open ports on tubing capped 11/21/20 2343   Alcohol Cap Used Yes 11/22/20 1223       Peripheral IV 11/21/20 Anterior;Right Forearm (Active)   Site Assessment Clean, dry, & intact 11/22/20 1223   Phlebitis Assessment 0 11/22/20 1223   Infiltration Assessment 0 11/22/20 1223   Dressing Status Clean, dry, & intact 11/22/20 1223   Dressing Type Tape;Transparent 11/22/20 1223   Hub Color/Line Status Patent; Flushed 11/22/20 1223   Action Taken Open ports on tubing capped 11/21/20 2343   Alcohol Cap Used Yes 11/22/20 1223        Opportunity for questions and clarification was provided.       Patient transported with:   O2 @ 4 liters  Registered Nurse

## 2020-11-22 NOTE — PROGRESS NOTES
11/22/2020 12:44 PM    Admit Date: 11/18/2020    Admit Diagnosis:   Acute respiratory failure ([facility] [J96.00]    Subjective: Jose Maria Barnes denies chest pain or shortness of breath. Remains in NSR.     Current Facility-Administered Medications   Medication Dose Route Frequency    [START ON 11/23/2020] predniSONE (DELTASONE) tablet 30 mg  30 mg Oral DAILY WITH BREAKFAST    amiodarone (CORDARONE) tablet 400 mg  400 mg Oral TID    dilTIAZem IR (CARDIZEM) tablet 60 mg  60 mg Oral AC&HS    rivaroxaban (XARELTO) tablet 20 mg  20 mg Oral DAILY WITH DINNER    insulin lispro (HUMALOG) injection   SubCUTAneous AC&HS    albuterol-ipratropium (DUO-NEB) 2.5 MG-0.5 MG/3 ML  3 mL Nebulization Q6H PRN    miconazole (SECURA) 2 % extra thick cream   Topical BID    aspirin chewable tablet 81 mg  81 mg Oral DAILY    atorvastatin (LIPITOR) tablet 10 mg  10 mg Oral QHS    [Held by provider] furosemide (LASIX) tablet 40 mg  40 mg Oral DAILY    zinc oxide-cod liver oil (DESITIN) 40 % paste 1 g  1 g Topical TID    [Held by provider] metoprolol tartrate (LOPRESSOR) tablet 25 mg  25 mg Oral BID    sodium chloride (NS) flush 5-40 mL  5-40 mL IntraVENous Q8H    sodium chloride (NS) flush 5-40 mL  5-40 mL IntraVENous PRN    acetaminophen (TYLENOL) tablet 650 mg  650 mg Oral Q6H PRN    Or    acetaminophen (TYLENOL) suppository 650 mg  650 mg Rectal Q6H PRN    polyethylene glycol (MIRALAX) packet 17 g  17 g Oral DAILY PRN    promethazine (PHENERGAN) tablet 12.5 mg  12.5 mg Oral Q6H PRN    Or    ondansetron (ZOFRAN) injection 4 mg  4 mg IntraVENous Q6H PRN    metoprolol (LOPRESSOR) injection 5 mg  5 mg IntraVENous Q6H PRN    levoFLOXacin (LEVAQUIN) 750 mg in D5W IVPB  750 mg IntraVENous Q24H    glucose chewable tablet 16 g  4 Tab Oral PRN    dextrose (D50W) injection syrg 12.5-25 g  12.5-25 g IntraVENous PRN    glucagon (GLUCAGEN) injection 1 mg  1 mg IntraMUSCular PRN         Objective:      Physical Exam: NURSING DISCHARGE NOTE    Discharged Home via Wheelchair. Accompanied by Friend  Belongings Taken by patient/family. Reviewed all discharge instructions with patient and friend, they verbalized understanding. norco rx given to patient/friend.    Visit Vitals  /63 (BP 1 Location: Right arm, BP Patient Position: At rest)   Pulse 82   Temp 98.5 °F (36.9 °C)   Resp 18   Ht 5' 3\" (1.6 m)   Wt 308 lb 12.8 oz (140.1 kg)   SpO2 96%   BMI 54.70 kg/m²     Gen:  NAD  Mental Status - Alert. General Appearance - Not in acute distress. Chest and Lung Exam   Inspection: Accessory muscles - No use of accessory muscles in breathing. Auscultation:   Breath sounds: - Normal.   Cardiovascular   Inspection: Jugular vein - Bilateral - Inspection Normal.   Palpation/Percussion:   Apical Impulse: - Normal.   Auscultation: Rhythm - Regular. Heart Sounds - S1 WNL and S2 WNL. No S3 or S4. Murmurs & Other Heart Sounds: Auscultation of the heart reveals - No Murmurs. Peripheral Vascular   Upper Extremity: Inspection - Bilateral - No Cyanotic nailbeds or Digital clubbing. Lower Extremity:   Palpation: Edema - Bilateral - No edema. Abdomen:   Soft, non-tender, bowel sounds are active. Neuro: A&O times 3, CN and motor grossly WNL    Data Review:   Recent Labs     11/21/20  0333 11/20/20  0000   WBC 7.6 6.5   HGB 13.4 12.8   HCT 43.5 41.2   * 130*     Recent Labs     11/22/20  0438 11/21/20  0333 11/20/20  0000    135* 136   K 3.8 3.7 3.8   CL 94* 95* 94*   CO2 42* 38* 40*   * 163* 136*   BUN 48* 39* 30*   CREA 0.67 0.60 0.68   CA 9.7 9.7 9.8   MG 2.5*  --  2.2   PHOS  --   --  2.6   ALB  --   --  2.6*   TBILI  --   --  0.6   ALT  --   --  33   INR 1.4* 1.3* 1.2*       No results for input(s): TROIQ, CPK, CKMB in the last 72 hours.       Intake/Output Summary (Last 24 hours) at 11/22/2020 1352  Last data filed at 11/22/2020 1223  Gross per 24 hour   Intake 1927.82 ml   Output    Net 1927.82 ml        Telemetry: NSR      Assessment:     Principal Problem:    Acute on chronic respiratory failure with hypoxia and hypercapnia (HCC) (11/20/2020)    Active Problems:    Mixed hyperlipidemia (11/15/2020)      Morbid obesity ([address] (11/15/2020)      Acute respiratory failure ([facility] (11/18/2020)      Atrial flutter with rapid ventricular response ([facility] (11/20/2020)      JOSLYN (obstructive sleep apnea) (11/20/2020)        Plan:     Alaina Brandon (likely some atrial fibrillation as well) with RVR and PAF:  Back in NSR converted to PO diltiazem and amiodarone 11/21/2020  Dr. Aparna Machuca consulted for Yong Good for Monday  Continue on Xarelto  CHADS2 vasc score: 2    Acute on chronic hypoxia and hypercapneia  Noncompliance with CPAP, does not want and can not afford  Other options? May be contributing to her arrhythmias as well.

## 2020-11-22 NOTE — PROGRESS NOTES
2300: shift report given by Sidney Ramirez RN   Pt and VSS, no complaints at this time, will continue to monitor     0700: End of Shift Note    Bedside shift change report given to Lelo Quiñonez and Kennedi Gracia RN  (oncoming nurse) by Summer Deleon (offgoing nurse). Report included the following information SBAR, Kardex, MAR and Cardiac Rhythm NSR    Shift worked:  2048-3402   Shift summary and any significant changes:     No changes        Concerns for physician to address:  None at this time    Zone phone for oncoming shift:   [id number]     Activity:  Activity Level: Bed Rest  Number times ambulated in hallways past shift: 0  Number of times OOB to chair past shift: 0    Cardiac:   Cardiac Monitoring: Yes      Cardiac Rhythm: Normal sinus rhythm    Access:   Current line(s): PIV     Genitourinary:   Urinary status: voiding    Respiratory:   O2 Device: Room air  Chronic home O2 use?: YES  Incentive spirometer at bedside: NO     GI:  Last Bowel Movement Date: 11/17/20  Current diet:  DIET DIABETIC CONSISTENT CARB Regular  DIET NPO  Passing flatus: YES  Tolerating current diet: YES  % Diet Eaten: 100 %    Pain Management:   Patient states pain is manageable on current regimen: YES    Skin:  Gaston Score: 15  Interventions: turn team and PT/OT consult    Patient Safety:  Fall Score:  Total Score: 2  Interventions: bed/chair alarm and gripper socks  High Fall Risk: Yes    Length of Stay:  Expected LOS: 3d 14h  Actual LOS: [facility]

## 2020-11-22 NOTE — PROGRESS NOTES
Hospitalist Progress Note    NAME: Reny Perez   :  1960   MRN:  [id number]     Interim Hospital Summary: 63 y.o. female whom presented on 2020 with      Assessment / Plan:    Acute on chronic hypoxic and hypercapnic respiratory failure  Acute metabolic encephalopathy, resolved  Suspect COPD exacerbation (unknown FEV1)  Hx Tobacco abuse  -CXR There is increasing interstitial and vague airspace opacities in the lungs may represent edema versus atypical pneumonia. .. Cardiomegaly appears stable allowing for differences in technique  -SARS COV2 rapid NEG, PCR NEG  -baseline 3-4L oxgyen  -mental status improved with BIPAP support - use at HS prn  -completed course levaquin   -taper steroids.  -diamox prn. Serum C02 has come down.    -appreciate pulmonary input. Try get bedside PFTs prior to discharge. Ideally should get outpatient sleep study but patient does not walk or get out of there house. Afib RVR  -not on anticoagulation as an outpatient due to compliance issues, cont ASA  -now on cardizem infusion with addition of amiodarone   -cardiology planning YANNA/AFL ablation on Monday    DM2  -SSI prn    40 or above Morbid obesity / Body mass index is 54.7 kg/m². Code status: Full  Prophylaxis: Lovenox  Recommended Disposition: Home w/Family       Subjective:     Chief Complaint / Reason for Physician Visit  Follow up of AMS, COPD, OHS, Afib and encephalopathy  Chart reviewed in detail. Discussed with RN events overnight.    Leg swelling about same    Review of Systems:  Symptom Y/N Comments  Symptom Y/N Comments   Fever/Chills    Chest Pain     Poor Appetite    Edema y    Cough    Abdominal Pain     Sputum    Joint Pain     SOB/CHOPRA   improved  Pruritis/Rash     Nausea/vomit    Tolerating PT/OT     Diarrhea    Tolerating Diet     Constipation    Other       Could NOT obtain due to:      PO intake:   Patient Vitals for the past 72 hrs:   % Diet Eaten   11/22/20 0912 100 %   11/20/20 1445 100 %   11/20/20 1315 100 %   11/20/20 1100 100 %     Objective:     VITALS:   Last 24hrs VS reviewed since prior progress note. Most recent are:  Patient Vitals for the past 24 hrs:   Temp Pulse Resp BP SpO2   11/22/20 1029 98.6 °F (37 °C) 80 18 (!) 121/56 94 %   11/22/20 0707 98 °F (36.7 °C) 83  128/62 93 %   11/22/20 0425 98.6 °F (37 °C) 78 20 121/64 95 %   11/22/20 0200  74  111/61 90 %   11/21/20 2343 98.8 °F (37.1 °C) 83 20 116/63 94 %   11/21/20 1952 97.9 °F (36.6 °C) 83 20 (!) 123/45 95 %   11/21/20 1600  83      11/21/20 1556 98 °F (36.7 °C) 85 16 (!) 146/57 96 %   11/21/20 1400  79  (!) 135/49 96 %   11/21/20 1300  97  105/74    11/21/20 1254 97.9 °F (36.6 °C) 88 20 (!) 113/53 98 %   11/21/20 1200  79  (!) 161/84 100 %       Intake/Output Summary (Last 24 hours) at 11/22/2020 1125  Last data filed at 11/22/2020 2706  Gross per 24 hour   Intake 1727.82 ml   Output    Net 1727.82 ml        I had a face to face encounter, and independently examined this patient on 11/22/2020, as outlined below:  PHYSICAL EXAM:  General: WD, WN. Alert, cooperative, no acute distress    EENT:  EOMI. Anicteric sclerae. MMM  Resp:  Diminished breath sounds. No accessory muscle use  CV:  Irregular  rhythm,  (+) edema  GI:  Soft, Non distended, Non tender. +Bowel sounds  Neurologic:  Alert and oriented X 3, normal speech,   Psych:   Good insight. Not anxious nor agitated  Skin:  No rashes.   No jaundice    Reviewed most current lab test results and cultures  YES  Reviewed most current radiology test results   YES  Review and summation of old records today    NO  Reviewed patient's current orders and MAR    YES  PMH/SH reviewed - no change compared to H&P  ________________________________________________________________________  Care Plan discussed with:    Comments   Patient x    Family      RN x    Care Manager     Consultant                        Multidiciplinary team rounds were held today with , nursing, pharmacist and clinical coordinator. Patient's plan of care was discussed; medications were reviewed and discharge planning was addressed. ________________________________________________________________________  Total NON critical care TIME:  25   Minutes    Total CRITICAL CARE TIME Spent:   Minutes non procedure based      Comments   >50% of visit spent in counseling and coordination of care      This includes time during multidisciplinary rounds if indicated above   ________________________________________________________________________  Corby Grijalva MD     Procedures: see electronic medical records for all procedures/Xrays and details which were not copied into this note but were reviewed prior to creation of Plan. LABS:  I reviewed today's most current labs and imaging studies.   Pertinent labs include:  Recent Labs     11/21/20  0333 11/20/20  0000   WBC 7.6 6.5   HGB 13.4 12.8   HCT 43.5 41.2   * 130*     Recent Labs     11/22/20  0438 11/21/20  0333 11/20/20  0000    135* 136   K 3.8 3.7 3.8   CL 94* 95* 94*   CO2 42* 38* 40*   * 163* 136*   BUN 48* 39* 30*   CREA 0.67 0.60 0.68   CA 9.7 9.7 9.8   MG 2.5*  --  2.2   PHOS  --   --  2.6   ALB  --   --  2.6*   TBILI  --   --  0.6   ALT  --   --  33   INR 1.4* 1.3* 1.2*

## 2020-11-23 ENCOUNTER — ANESTHESIA (OUTPATIENT)
Dept: CARDIAC CATH/INVASIVE PROCEDURES | Age: 60
DRG: 273 | End: 2020-11-23
Payer: MEDICARE

## 2020-11-23 ENCOUNTER — ANESTHESIA EVENT (OUTPATIENT)
Dept: CARDIAC CATH/INVASIVE PROCEDURES | Age: 60
DRG: 273 | End: 2020-11-23
Payer: MEDICARE

## 2020-11-23 ENCOUNTER — APPOINTMENT (OUTPATIENT)
Dept: CARDIAC CATH/INVASIVE PROCEDURES | Age: 60
DRG: 273 | End: 2020-11-23
Attending: INTERNAL MEDICINE
Payer: MEDICARE

## 2020-11-23 LAB
ANION GAP SERPL CALC-SCNC: 1 MMOL/L (ref 5–15)
BUN SERPL-MCNC: 37 MG/DL (ref 6–20)
BUN/CREAT SERPL: 61 (ref 12–20)
CALCIUM SERPL-MCNC: 9.7 MG/DL (ref 8.5–10.1)
CHLORIDE SERPL-SCNC: 94 MMOL/L (ref 97–108)
CO2 SERPL-SCNC: 42 MMOL/L (ref 21–32)
COMMENT, HOLDF: NORMAL
CREAT SERPL-MCNC: 0.61 MG/DL (ref 0.55–1.02)
D DIMER PPP FEU-MCNC: 0.72 MG/L FEU (ref 0–0.65)
ERYTHROCYTE [DISTWIDTH] IN BLOOD BY AUTOMATED COUNT: 13.2 % (ref 11.5–14.5)
FIBRINOGEN PPP-MCNC: 260 MG/DL (ref 200–475)
GLUCOSE BLD STRIP.AUTO-MCNC: 116 MG/DL (ref 65–100)
GLUCOSE BLD STRIP.AUTO-MCNC: 131 MG/DL (ref 65–100)
GLUCOSE BLD STRIP.AUTO-MCNC: 137 MG/DL (ref 65–100)
GLUCOSE BLD STRIP.AUTO-MCNC: 200 MG/DL (ref 65–100)
GLUCOSE BLD STRIP.AUTO-MCNC: 96 MG/DL (ref 65–100)
GLUCOSE SERPL-MCNC: 155 MG/DL (ref 65–100)
HCT VFR BLD AUTO: 42.8 % (ref 35–47)
HGB BLD-MCNC: 13.3 G/DL (ref 11.5–16)
INR PPP: 1.4 (ref 0.9–1.1)
MCH RBC QN AUTO: 31.5 PG (ref 26–34)
MCHC RBC AUTO-ENTMCNC: 31.1 G/DL (ref 30–36.5)
MCV RBC AUTO: 101.4 FL (ref 80–99)
NRBC # BLD: 0 K/UL (ref 0–0.01)
NRBC BLD-RTO: 0 PER 100 WBC
PLATELET # BLD AUTO: 105 K/UL (ref 150–400)
POTASSIUM SERPL-SCNC: 3.6 MMOL/L (ref 3.5–5.1)
PROTHROMBIN TIME: 14.8 SEC (ref 9–11.1)
RBC # BLD AUTO: 4.22 M/UL (ref 3.8–5.2)
SAMPLES BEING HELD,HOLD: NORMAL
SERVICE CMNT-IMP: ABNORMAL
SERVICE CMNT-IMP: NORMAL
SODIUM SERPL-SCNC: 137 MMOL/L (ref 136–145)
WBC # BLD AUTO: 7.7 K/UL (ref 3.6–11)

## 2020-11-23 PROCEDURE — 74011250636 HC RX REV CODE- 250/636: Performed by: INTERNAL MEDICINE

## 2020-11-23 PROCEDURE — 74011250637 HC RX REV CODE- 250/637: Performed by: INTERNAL MEDICINE

## 2020-11-23 PROCEDURE — 74011250636 HC RX REV CODE- 250/636: Performed by: NURSE ANESTHETIST, CERTIFIED REGISTERED

## 2020-11-23 PROCEDURE — 36415 COLL VENOUS BLD VENIPUNCTURE: CPT

## 2020-11-23 PROCEDURE — 80048 BASIC METABOLIC PNL TOTAL CA: CPT

## 2020-11-23 PROCEDURE — C1894 INTRO/SHEATH, NON-LASER: HCPCS | Performed by: INTERNAL MEDICINE

## 2020-11-23 PROCEDURE — 74011000250 HC RX REV CODE- 250: Performed by: INTERNAL MEDICINE

## 2020-11-23 PROCEDURE — 77030029065 HC DRSG HEMO QCLOT ZMED -B: Performed by: INTERNAL MEDICINE

## 2020-11-23 PROCEDURE — 4A0234Z MEASUREMENT OF CARDIAC ELECTRICAL ACTIVITY, PERCUTANEOUS APPROACH: ICD-10-PCS | Performed by: INTERNAL MEDICINE

## 2020-11-23 PROCEDURE — 3331090001 HH PPS REVENUE CREDIT

## 2020-11-23 PROCEDURE — 77010033678 HC OXYGEN DAILY

## 2020-11-23 PROCEDURE — 93621 COMP EP EVL L PAC&REC C SINS: CPT | Performed by: INTERNAL MEDICINE

## 2020-11-23 PROCEDURE — 93613 INTRACARDIAC EPHYS 3D MAPG: CPT

## 2020-11-23 PROCEDURE — 74011000258 HC RX REV CODE- 258: Performed by: NURSE ANESTHETIST, CERTIFIED REGISTERED

## 2020-11-23 PROCEDURE — 85379 FIBRIN DEGRADATION QUANT: CPT

## 2020-11-23 PROCEDURE — 02583ZZ DESTRUCTION OF CONDUCTION MECHANISM, PERCUTANEOUS APPROACH: ICD-10-PCS | Performed by: INTERNAL MEDICINE

## 2020-11-23 PROCEDURE — 93655 ICAR CATH ABLTJ DSCRT ARRHYT: CPT | Performed by: INTERNAL MEDICINE

## 2020-11-23 PROCEDURE — 85384 FIBRINOGEN ACTIVITY: CPT

## 2020-11-23 PROCEDURE — 77030018729 HC ELECTRD DEFIB PAD CARD -B: Performed by: INTERNAL MEDICINE

## 2020-11-23 PROCEDURE — 74011250637 HC RX REV CODE- 250/637: Performed by: NURSE PRACTITIONER

## 2020-11-23 PROCEDURE — 99232 SBSQ HOSP IP/OBS MODERATE 35: CPT | Performed by: INTERNAL MEDICINE

## 2020-11-23 PROCEDURE — C1732 CATH, EP, DIAG/ABL, 3D/VECT: HCPCS | Performed by: INTERNAL MEDICINE

## 2020-11-23 PROCEDURE — 77030010880 HC CBL EP SUPRME STJU -C: Performed by: INTERNAL MEDICINE

## 2020-11-23 PROCEDURE — 93623 PRGRMD STIMJ&PACG IV RX NFS: CPT | Performed by: INTERNAL MEDICINE

## 2020-11-23 PROCEDURE — 93653 COMPRE EP EVAL TX SVT: CPT | Performed by: INTERNAL MEDICINE

## 2020-11-23 PROCEDURE — 77030027107 HC PTCH EXT REF CARTO3 J&J -F: Performed by: INTERNAL MEDICINE

## 2020-11-23 PROCEDURE — 4A023FZ MEASUREMENT OF CARDIAC RHYTHM, PERCUTANEOUS APPROACH: ICD-10-PCS | Performed by: INTERNAL MEDICINE

## 2020-11-23 PROCEDURE — 85610 PROTHROMBIN TIME: CPT

## 2020-11-23 PROCEDURE — 76060000034 HC ANESTHESIA 1.5 TO 2 HR: Performed by: INTERNAL MEDICINE

## 2020-11-23 PROCEDURE — 93613 INTRACARDIAC EPHYS 3D MAPG: CPT | Performed by: INTERNAL MEDICINE

## 2020-11-23 PROCEDURE — 85027 COMPLETE CBC AUTOMATED: CPT

## 2020-11-23 PROCEDURE — 82962 GLUCOSE BLOOD TEST: CPT

## 2020-11-23 PROCEDURE — C1730 CATH, EP, 19 OR FEW ELECT: HCPCS | Performed by: INTERNAL MEDICINE

## 2020-11-23 PROCEDURE — 94660 CPAP INITIATION&MGMT: CPT

## 2020-11-23 PROCEDURE — 02K83ZZ MAP CONDUCTION MECHANISM, PERCUTANEOUS APPROACH: ICD-10-PCS | Performed by: INTERNAL MEDICINE

## 2020-11-23 PROCEDURE — 74011636637 HC RX REV CODE- 636/637: Performed by: INTERNAL MEDICINE

## 2020-11-23 PROCEDURE — 5A09457 ASSISTANCE WITH RESPIRATORY VENTILATION, 24-96 CONSECUTIVE HOURS, CONTINUOUS POSITIVE AIRWAY PRESSURE: ICD-10-PCS | Performed by: INTERNAL MEDICINE

## 2020-11-23 PROCEDURE — 65660000000 HC RM CCU STEPDOWN

## 2020-11-23 PROCEDURE — 3331090002 HH PPS REVENUE DEBIT

## 2020-11-23 RX ORDER — SODIUM CHLORIDE 0.9 % (FLUSH) 0.9 %
5-40 SYRINGE (ML) INJECTION AS NEEDED
Status: CANCELLED | OUTPATIENT
Start: 2020-11-23

## 2020-11-23 RX ORDER — HEPARIN SODIUM 200 [USP'U]/100ML
INJECTION, SOLUTION INTRAVENOUS
Status: DISCONTINUED | OUTPATIENT
Start: 2020-11-23 | End: 2020-11-23 | Stop reason: HOSPADM

## 2020-11-23 RX ORDER — HYDROMORPHONE HYDROCHLORIDE 1 MG/ML
0.5 INJECTION, SOLUTION INTRAMUSCULAR; INTRAVENOUS; SUBCUTANEOUS
Status: DISCONTINUED | OUTPATIENT
Start: 2020-11-23 | End: 2020-11-23 | Stop reason: HOSPADM

## 2020-11-23 RX ORDER — ACETAMINOPHEN 325 MG/1
650 TABLET ORAL ONCE
Status: CANCELLED | OUTPATIENT
Start: 2020-11-23 | End: 2020-11-23

## 2020-11-23 RX ORDER — LIDOCAINE HYDROCHLORIDE 10 MG/ML
0.1 INJECTION, SOLUTION EPIDURAL; INFILTRATION; INTRACAUDAL; PERINEURAL AS NEEDED
Status: CANCELLED | OUTPATIENT
Start: 2020-11-23

## 2020-11-23 RX ORDER — DIPHENHYDRAMINE HYDROCHLORIDE 50 MG/ML
12.5 INJECTION, SOLUTION INTRAMUSCULAR; INTRAVENOUS AS NEEDED
Status: DISCONTINUED | OUTPATIENT
Start: 2020-11-23 | End: 2020-11-23 | Stop reason: HOSPADM

## 2020-11-23 RX ORDER — PROPOFOL 10 MG/ML
INJECTION, EMULSION INTRAVENOUS
Status: DISCONTINUED | OUTPATIENT
Start: 2020-11-23 | End: 2020-11-23 | Stop reason: HOSPADM

## 2020-11-23 RX ORDER — AMOXICILLIN 250 MG
1 CAPSULE ORAL 2 TIMES DAILY
Status: DISCONTINUED | OUTPATIENT
Start: 2020-11-23 | End: 2020-11-24 | Stop reason: HOSPADM

## 2020-11-23 RX ORDER — MIDAZOLAM HYDROCHLORIDE 1 MG/ML
INJECTION, SOLUTION INTRAMUSCULAR; INTRAVENOUS AS NEEDED
Status: DISCONTINUED | OUTPATIENT
Start: 2020-11-23 | End: 2020-11-23 | Stop reason: HOSPADM

## 2020-11-23 RX ORDER — SODIUM CHLORIDE, SODIUM LACTATE, POTASSIUM CHLORIDE, CALCIUM CHLORIDE 600; 310; 30; 20 MG/100ML; MG/100ML; MG/100ML; MG/100ML
100 INJECTION, SOLUTION INTRAVENOUS CONTINUOUS
Status: DISCONTINUED | OUTPATIENT
Start: 2020-11-23 | End: 2020-11-23 | Stop reason: HOSPADM

## 2020-11-23 RX ORDER — PROPOFOL 10 MG/ML
INJECTION, EMULSION INTRAVENOUS AS NEEDED
Status: DISCONTINUED | OUTPATIENT
Start: 2020-11-23 | End: 2020-11-23 | Stop reason: HOSPADM

## 2020-11-23 RX ORDER — FENTANYL CITRATE 50 UG/ML
25 INJECTION, SOLUTION INTRAMUSCULAR; INTRAVENOUS
Status: DISCONTINUED | OUTPATIENT
Start: 2020-11-23 | End: 2020-11-23 | Stop reason: HOSPADM

## 2020-11-23 RX ORDER — POLYETHYLENE GLYCOL 3350 17 G/17G
17 POWDER, FOR SOLUTION ORAL DAILY
Status: DISCONTINUED | OUTPATIENT
Start: 2020-11-23 | End: 2020-11-24 | Stop reason: HOSPADM

## 2020-11-23 RX ORDER — FENTANYL CITRATE 50 UG/ML
50 INJECTION, SOLUTION INTRAMUSCULAR; INTRAVENOUS AS NEEDED
Status: CANCELLED | OUTPATIENT
Start: 2020-11-23

## 2020-11-23 RX ORDER — SODIUM CHLORIDE 0.9 % (FLUSH) 0.9 %
5-40 SYRINGE (ML) INJECTION EVERY 8 HOURS
Status: CANCELLED | OUTPATIENT
Start: 2020-11-23

## 2020-11-23 RX ORDER — SODIUM CHLORIDE 9 MG/ML
25 INJECTION, SOLUTION INTRAVENOUS CONTINUOUS
Status: CANCELLED | OUTPATIENT
Start: 2020-11-23 | End: 2020-11-24

## 2020-11-23 RX ORDER — SODIUM CHLORIDE 0.9 % (FLUSH) 0.9 %
5-40 SYRINGE (ML) INJECTION AS NEEDED
Status: DISCONTINUED | OUTPATIENT
Start: 2020-11-23 | End: 2020-11-23 | Stop reason: HOSPADM

## 2020-11-23 RX ORDER — MIDAZOLAM HYDROCHLORIDE 1 MG/ML
1 INJECTION, SOLUTION INTRAMUSCULAR; INTRAVENOUS AS NEEDED
Status: CANCELLED | OUTPATIENT
Start: 2020-11-23

## 2020-11-23 RX ORDER — SODIUM CHLORIDE, SODIUM LACTATE, POTASSIUM CHLORIDE, CALCIUM CHLORIDE 600; 310; 30; 20 MG/100ML; MG/100ML; MG/100ML; MG/100ML
100 INJECTION, SOLUTION INTRAVENOUS CONTINUOUS
Status: CANCELLED | OUTPATIENT
Start: 2020-11-23 | End: 2020-11-24

## 2020-11-23 RX ORDER — SODIUM CHLORIDE 0.9 % (FLUSH) 0.9 %
5-40 SYRINGE (ML) INJECTION EVERY 8 HOURS
Status: DISCONTINUED | OUTPATIENT
Start: 2020-11-23 | End: 2020-11-23 | Stop reason: HOSPADM

## 2020-11-23 RX ORDER — SODIUM CHLORIDE 9 MG/ML
INJECTION, SOLUTION INTRAVENOUS
Status: DISCONTINUED | OUTPATIENT
Start: 2020-11-23 | End: 2020-11-23 | Stop reason: HOSPADM

## 2020-11-23 RX ORDER — LIDOCAINE HYDROCHLORIDE 10 MG/ML
INJECTION INFILTRATION; PERINEURAL AS NEEDED
Status: DISCONTINUED | OUTPATIENT
Start: 2020-11-23 | End: 2020-11-23 | Stop reason: HOSPADM

## 2020-11-23 RX ORDER — ROPIVACAINE HYDROCHLORIDE 5 MG/ML
30 INJECTION, SOLUTION EPIDURAL; INFILTRATION; PERINEURAL AS NEEDED
Status: CANCELLED | OUTPATIENT
Start: 2020-11-23

## 2020-11-23 RX ORDER — MIDAZOLAM HYDROCHLORIDE 1 MG/ML
0.5 INJECTION, SOLUTION INTRAMUSCULAR; INTRAVENOUS
Status: DISCONTINUED | OUTPATIENT
Start: 2020-11-23 | End: 2020-11-23 | Stop reason: HOSPADM

## 2020-11-23 RX ORDER — MORPHINE SULFATE 10 MG/ML
2 INJECTION, SOLUTION INTRAMUSCULAR; INTRAVENOUS
Status: DISCONTINUED | OUTPATIENT
Start: 2020-11-23 | End: 2020-11-23 | Stop reason: HOSPADM

## 2020-11-23 RX ORDER — ONDANSETRON 2 MG/ML
4 INJECTION INTRAMUSCULAR; INTRAVENOUS AS NEEDED
Status: DISCONTINUED | OUTPATIENT
Start: 2020-11-23 | End: 2020-11-23 | Stop reason: HOSPADM

## 2020-11-23 RX ADMIN — DILTIAZEM HYDROCHLORIDE 60 MG: 60 TABLET, FILM COATED ORAL at 15:09

## 2020-11-23 RX ADMIN — POLYETHYLENE GLYCOL 3350 17 G: 17 POWDER, FOR SOLUTION ORAL at 15:09

## 2020-11-23 RX ADMIN — MICONAZOLE NITRATE: 20 CREAM TOPICAL at 17:25

## 2020-11-23 RX ADMIN — AMIODARONE HYDROCHLORIDE 400 MG: 200 TABLET ORAL at 21:37

## 2020-11-23 RX ADMIN — MICONAZOLE NITRATE: 20 CREAM TOPICAL at 09:45

## 2020-11-23 RX ADMIN — Medication 1 G: at 17:25

## 2020-11-23 RX ADMIN — INSULIN LISPRO 2 UNITS: 100 INJECTION, SOLUTION INTRAVENOUS; SUBCUTANEOUS at 21:36

## 2020-11-23 RX ADMIN — DILTIAZEM HYDROCHLORIDE 60 MG: 60 TABLET, FILM COATED ORAL at 21:36

## 2020-11-23 RX ADMIN — Medication 10 ML: at 22:00

## 2020-11-23 RX ADMIN — PROPOFOL 40 MG: 10 INJECTION, EMULSION INTRAVENOUS at 13:13

## 2020-11-23 RX ADMIN — DOCUSATE SODIUM 50MG AND SENNOSIDES 8.6MG 1 TABLET: 8.6; 5 TABLET, FILM COATED ORAL at 15:09

## 2020-11-23 RX ADMIN — PREDNISONE 30 MG: 20 TABLET ORAL at 15:18

## 2020-11-23 RX ADMIN — Medication 10 ML: at 06:00

## 2020-11-23 RX ADMIN — Medication 1 G: at 09:46

## 2020-11-23 RX ADMIN — MIDAZOLAM 2 MG: 1 INJECTION INTRAMUSCULAR; INTRAVENOUS at 12:05

## 2020-11-23 RX ADMIN — Medication 1 G: at 22:00

## 2020-11-23 RX ADMIN — PROPOFOL 50 MG: 10 INJECTION, EMULSION INTRAVENOUS at 12:00

## 2020-11-23 RX ADMIN — RIVAROXABAN 20 MG: 20 TABLET, FILM COATED ORAL at 17:25

## 2020-11-23 RX ADMIN — SODIUM CHLORIDE: 9 INJECTION, SOLUTION INTRAVENOUS at 11:44

## 2020-11-23 RX ADMIN — AMIODARONE HYDROCHLORIDE 400 MG: 200 TABLET ORAL at 15:11

## 2020-11-23 RX ADMIN — PROPOFOL 75 MCG/KG/MIN: 10 INJECTION, EMULSION INTRAVENOUS at 12:00

## 2020-11-23 RX ADMIN — ATORVASTATIN CALCIUM 10 MG: 10 TABLET, FILM COATED ORAL at 21:36

## 2020-11-23 NOTE — PROGRESS NOTES
Problem: Falls - Risk of  Goal: *Absence of Falls  Description: Document Oleksandr Going Fall Risk and appropriate interventions in the flowsheet. Outcome: Progressing Towards Goal  Note: Fall Risk Interventions:       Mentation Interventions:  Toileting rounds    Medication Interventions: Utilize gait belt for transfers/ambulation    Elimination Interventions: Call light in reach    History of Falls Interventions: Utilize gait belt for transfer/ambulation

## 2020-11-23 NOTE — ANESTHESIA POSTPROCEDURE EVALUATION
Procedure(s):  ABLATION A-FLUTTER  Ablation Svt/Vt Add On  Ep 3d Mapping  Lt Atrial Pace & Record During Ep Study  Drug Stimulation. MAC    Anesthesia Post Evaluation        Patient location during evaluation: PACU  Note status: Adequate. Level of consciousness: responsive to verbal stimuli and sleepy but conscious  Pain management: satisfactory to patient  Airway patency: patent  Anesthetic complications: no  Cardiovascular status: acceptable  Respiratory status: acceptable  Hydration status: acceptable  Comments: +Post-Anesthesia Evaluation and Assessment    Patient: Siria Garcia MRN: [id number]  SSN: [id number]   YOB: 1960  Age: 63 y.o. Sex: female      Cardiovascular Function/Vital Signs    /66   Pulse 97   Temp 36.6 °C (97.9 °F)   Resp 20   Ht 5' 3\" (1.6 m)   Wt 138.1 kg (304 lb 8 oz)   SpO2 95%   BMI 53.94 kg/m²     Patient is status post Procedure(s):  ABLATION A-FLUTTER  Ablation Svt/Vt Add On  Ep 3d Mapping  Lt Atrial Pace & Record During Ep Study  Drug Stimulation. Nausea/Vomiting: Controlled. Postoperative hydration reviewed and adequate. Pain:  Pain Scale 1: Numeric (0 - 10) (11/23/20 1430)  Pain Intensity 1: 0 (11/23/20 1430)   Managed. Neurological Status: At baseline. Mental Status and Level of Consciousness: Arousable. Pulmonary Status:   O2 Device: Nasal cannula (11/23/20 1326)   Adequate oxygenation and airway patent. Complications related to anesthesia: None    Post-anesthesia assessment completed. No concerns. Signed By: Rach Chau MD    11/23/2020  Post anesthesia nausea and vomiting:  controlled      INITIAL Post-op Vital signs:   Vitals Value Taken Time   /66 11/23/2020  4:00 PM   Temp     Pulse 93 11/23/2020  4:06 PM   Resp     SpO2 92 % 11/23/2020  4:06 PM   Vitals shown include unvalidated device data.

## 2020-11-23 NOTE — DISCHARGE INSTRUCTIONS
[address] [place] [address]  [phone]        [address] [place] [address] DISCHARGE INSTRUCTIONS    Patient ID:  Reny Perez  [id number]  23 y.o.  1960    Admit Date: 11/18/2020    Discharge Date: 11/23/2020     Admitting Physician: Michael Napier MD     Discharge Physician: Michael Napier MD    Admission Diagnoses:   Acute respiratory failure [facility]) [J96.00]    Discharge Diagnoses:   Principal Problem:    Acute on chronic respiratory failure with hypoxia and hypercapnia ([facility] (11/20/2020)    Active Problems:    Mixed hyperlipidemia (11/15/2020)      Morbid obesity ([facility] (11/15/2020)      Acute respiratory failure ([facility] [address] (11/18/2020)      Atrial flutter with rapid ventricular response ([facility] [address] (11/20/2020)      JOSLYN (obstructive sleep apnea) (11/20/2020)        Discharge Condition: Good    Cardiology Procedures this Admission:  Atrial flutter. Disposition: home    Reference discharge instructions provided by nursing for diet and activity. Follow-up with Dr Karl Trinidad or his Nurse Practitioners in 1-2 weeks. Call [phone] to make an appointment. Signed:  RITA Harrell  11/23/2020  1:05 PM    S/P ATRIAL FLUTTER ABLATION DISCHARGE INSTRUCTIONS    It is normal to feel tired the first couple days. Take it easy and follow the physicians instructions. CHECK THE CATHETER INSERTION SITE DAILY:  You may shower 24 hours after the procedure, remove the bandage during showering. Wash with soap and water and pat dry. Gentle cleaning of the site with soap and water is sufficient, cover with a dry clean dressing or bandage. Do not apply creams or powders to the area. Do not sit in a bathtub or pool of water for 7 days or until wound has completely healed. Temporary bruising and discomfort is normal and may last a few weeks. You may have a  formation of a small lump at the site which may last up to 6 weeks.     CALL THE PHYSICIAN:  If the site becomes red, swollen or feels warm to the touch  If there is bleeding or drainage or if there is unusual pain at the groin or down the leg. If there is any bleeding, lie down, apply pressure or have someone apply pressure with a clean cloth until the bleeding stops. If the bleeding continues, call 911 to be transported to the hospital.  DO NOT DRIVE YOURSELF, [address]. Activity:      For the first 24-48 hours or as instructed by the physician:  No lifting, pushing or pulling over 5 pounds and no straining the insertion site. Do not life grocery bags or the garbage can, do not run the vacuum  or  for 7 days. Start with short walks as in the hospital and gradually increase as tolerated each day. It is recommended to walk 30 minutes 5-7 days per week. Follow your physicians instructions on activity. Avoid walking outside in extremes of heat or cold. Walk inside when it is cold and windy or hot and humid. Things to keep in mind:  No driving for at least 5 days or as designated by your physician. Limit the number of times you go up and down the stairs  Take rests and pace yourself with activity. Be careful and do not strain with bowel movements. Medications: Take all medications as prescribed  Call your physician if you have any questions  Keep an updated list of your medications with you at all times and give a list to your physician and pharmacist    Signs and Symptoms:  Be cautious of symptoms of angina or recurrent symptoms such as chest discomfort, unusual shortness of breath or fatigue, palpitations. After Care: Follow up with your physician as instructed. Follow a heart healthy diet with proper portion control, daily stress management, daily exercise, blood pressure and cholesterol control , and smoking cessation.

## 2020-11-23 NOTE — ANESTHESIA PREPROCEDURE EVALUATION
Relevant Problems   No relevant active problems       Anesthetic History   No history of anesthetic complications            Review of Systems / Medical History  Patient summary reviewed, nursing notes reviewed and pertinent labs reviewed    Pulmonary  Within defined limits      Sleep apnea           Neuro/Psych   Within defined limits           Cardiovascular  Within defined limits  Hypertension      CHF  Dysrhythmias       Exercise tolerance: <4 METS  Comments:  The left ventricular size is normal. The left ventricular systolic function is normal. LV systolic pressure is normal.    GI/Hepatic/Renal  Within defined limits              Endo/Other  Within defined limits  Diabetes    Morbid obesity and arthritis     Other Findings            Physical Exam    Airway  Mallampati: III  TM Distance: 4 - 6 cm  Neck ROM: normal range of motion, short neck   Mouth opening: Normal     Cardiovascular    Rhythm: irregular           Dental    Dentition: Edentulous     Pulmonary  Breath sounds clear to auscultation               Abdominal  GI exam deferred       Other Findings            Anesthetic Plan    ASA: 3  Anesthesia type: MAC          Induction: Intravenous  Anesthetic plan and risks discussed with: Patient

## 2020-11-23 NOTE — PROGRESS NOTES
Cardiac Electrophysiology Progress Note            [address] [facility], [facility] [address] [facility]  [phone]    11/23/2020 8:49 AM    Admit Date: 11/18/2020    Admit Diagnosis: Acute respiratory failure ([address] [J96.00]    Subjective: Yareli Jones   denies chest pain, chest pressure/discomfort, dyspnea. She is resting comfortably in bed awaiting her procedure.      Visit Vitals  BP (!) 124/56 (BP 1 Location: Right arm, BP Patient Position: At rest)   Pulse 77   Temp 98.2 °F (36.8 °C)   Resp 18   Ht 5' 3\" (1.6 m)   Wt 304 lb 8 oz (138.1 kg)   SpO2 (!) 87%   BMI 53.94 kg/m²     Current Facility-Administered Medications   Medication Dose Route Frequency    predniSONE (DELTASONE) tablet 30 mg  30 mg Oral DAILY WITH BREAKFAST    amiodarone (CORDARONE) tablet 400 mg  400 mg Oral TID    dilTIAZem IR (CARDIZEM) tablet 60 mg  60 mg Oral AC&HS    insulin lispro (HUMALOG) injection   SubCUTAneous AC&HS    albuterol-ipratropium (DUO-NEB) 2.5 MG-0.5 MG/3 ML  3 mL Nebulization Q6H PRN    miconazole (SECURA) 2 % extra thick cream   Topical BID    aspirin chewable tablet 81 mg  81 mg Oral DAILY    atorvastatin (LIPITOR) tablet 10 mg  10 mg Oral QHS    [Held by provider] furosemide (LASIX) tablet 40 mg  40 mg Oral DAILY    zinc oxide-cod liver oil (DESITIN) 40 % paste 1 g  1 g Topical TID    [Held by provider] metoprolol tartrate (LOPRESSOR) tablet 25 mg  25 mg Oral BID    sodium chloride (NS) flush 5-40 mL  5-40 mL IntraVENous Q8H    sodium chloride (NS) flush 5-40 mL  5-40 mL IntraVENous PRN    acetaminophen (TYLENOL) tablet 650 mg  650 mg Oral Q6H PRN    Or    acetaminophen (TYLENOL) suppository 650 mg  650 mg Rectal Q6H PRN    polyethylene glycol (MIRALAX) packet 17 g  17 g Oral DAILY PRN    promethazine (PHENERGAN) tablet 12.5 mg  12.5 mg Oral Q6H PRN    Or    ondansetron (ZOFRAN) injection 4 mg  4 mg IntraVENous Q6H PRN    metoprolol (LOPRESSOR) injection 5 mg  5 mg IntraVENous Q6H PRN    glucose chewable tablet 16 g  4 Tab Oral PRN    dextrose (D50W) injection syrg 12.5-25 g  12.5-25 g IntraVENous PRN    glucagon (GLUCAGEN) injection 1 mg  1 mg IntraMUSCular PRN         Objective:      Visit Vitals  BP (!) 124/56 (BP 1 Location: Right arm, BP Patient Position: At rest)   Pulse 77   Temp 98.2 °F (36.8 °C)   Resp 18   Ht 5' 3\" (1.6 m)   Wt 304 lb 8 oz (138.1 kg)   SpO2 (!) 87%   BMI 53.94 kg/m²       Physical Exam:  Abdomen: soft, non-tender  Extremities: extremities normal, + edema. Heart: regular rate and rhythm  Lungs: clear to auscultation bilaterally  Pulses: 2+ and symmetric    Data Review:   Labs:    Recent Labs     11/23/20 0325 11/21/20  0333   WBC 7.7 7.6   HGB 13.3 13.4   HCT 42.8 43.5   * 133*     Recent Labs     11/23/20  0447 11/23/20 0325 11/22/20  0438 11/21/20  0333     --  138 135*   K 3.6  --  3.8 3.7   CL 94*  --  94* 95*   CO2 42*  --  42* 38*   *  --  215* 163*   BUN 37*  --  48* 39*   CREA 0.61  --  0.67 0.60   CA 9.7  --  9.7 9.7   MG  --   --  2.5*  --    INR  --  1.4* 1.4* 1.3*       No results for input(s): TROIQ, CPK, CKMB in the last 72 hours. Intake/Output Summary (Last 24 hours) at 11/23/2020 0849  Last data filed at 11/22/2020 1223  Gross per 24 hour   Intake 440 ml   Output    Net 440 ml        Telemetry: Sinus 77, pacs    Assessment:     Principal Problem:    Acute on chronic respiratory failure with hypoxia and hypercapnia ([facility] [address] (11/20/2020)    Active Problems:    Mixed hyperlipidemia (11/15/2020)      Morbid obesity ([facility] [address] (11/15/2020)      Acute respiratory failure ([facility] [address] (11/18/2020)      Atrial flutter with rapid ventricular response ([facility] [address] (11/20/2020)      JOSLYN (obstructive sleep apnea) (11/20/2020)        Plan:     Jalyn Gong is currently in sinus on amiodarone and dilt, PO and OAC. CHADSVASC 2. We discussed ablation as planned for this morning. I discussed the risks/benefits/alternatives of the procedure with the patient. Risks include (but are not limited to) bleeding, heart block, infection, cva/mi/tamponade/death. The patient understands and agrees to proceed. Bakari Daniels, RITA Riddle MD, [facility]  11/23/2020  8:49 AM    Patient seen and examined by me with nurse practitioner. I personally performed all components of the history, physical, and medical decision making and agree with the assessment and plan with minor modifications as noted. In sinus on amio and dilt. Will proceed with afl ablation this am. Pt understands and agrees to proceed. Cont med rx for copd.     Marylin Riddle MD, Nay Aggarwal

## 2020-11-23 NOTE — PROGRESS NOTES
1900-bedside report received from off going [address] and care resumed for MRS DALAL,introductions made, whiteboard updated, pt appears to be resting in bed at this time, denies pain, pt cleaned and repositioned in bed, will continue to monitor and act accordingly  2300-CHG bath completed, linen changed  0330-CHG bath completed  0400-pt requested to come off bipap, placed on 4L NC  0730-bedside report given to oncoming shift RN Minor Hashimoto, all questions answered

## 2020-11-23 NOTE — PROGRESS NOTES
Cardiology Progress Note      11/23/2020 10:42 AM    Admit Date: 11/18/2020    Admit Diagnosis: Acute respiratory failure (HCC) [J96.00]      Subjective: Shruthi Keys has no complaints. Chart reviewed, EP recommendations noted. She is in sinus rhythm.     Visit Vitals  BP (!) 124/56 (BP 1 Location: Right arm, BP Patient Position: At rest)   Pulse 77   Temp 98.2 °F (36.8 °C)   Resp 18   Ht 5' 3\" (1.6 m)   Wt 304 lb 8 oz (138.1 kg)   SpO2 (!) 87%   BMI 53.94 kg/m²       Current Facility-Administered Medications   Medication Dose Route Frequency    polyethylene glycol (MIRALAX) packet 17 g  17 g Oral DAILY    senna-docusate (PERICOLACE) 8.6-50 mg per tablet 1 Tab  1 Tab Oral BID    predniSONE (DELTASONE) tablet 30 mg  30 mg Oral DAILY WITH BREAKFAST    amiodarone (CORDARONE) tablet 400 mg  400 mg Oral TID    dilTIAZem IR (CARDIZEM) tablet 60 mg  60 mg Oral AC&HS    insulin lispro (HUMALOG) injection   SubCUTAneous AC&HS    albuterol-ipratropium (DUO-NEB) 2.5 MG-0.5 MG/3 ML  3 mL Nebulization Q6H PRN    miconazole (SECURA) 2 % extra thick cream   Topical BID    aspirin chewable tablet 81 mg  81 mg Oral DAILY    atorvastatin (LIPITOR) tablet 10 mg  10 mg Oral QHS    [Held by provider] furosemide (LASIX) tablet 40 mg  40 mg Oral DAILY    zinc oxide-cod liver oil (DESITIN) 40 % paste 1 g  1 g Topical TID    [Held by provider] metoprolol tartrate (LOPRESSOR) tablet 25 mg  25 mg Oral BID    sodium chloride (NS) flush 5-40 mL  5-40 mL IntraVENous Q8H    sodium chloride (NS) flush 5-40 mL  5-40 mL IntraVENous PRN    acetaminophen (TYLENOL) tablet 650 mg  650 mg Oral Q6H PRN    Or    acetaminophen (TYLENOL) suppository 650 mg  650 mg Rectal Q6H PRN    polyethylene glycol (MIRALAX) packet 17 g  17 g Oral DAILY PRN    promethazine (PHENERGAN) tablet 12.5 mg  12.5 mg Oral Q6H PRN    Or    ondansetron (ZOFRAN) injection 4 mg  4 mg IntraVENous Q6H PRN    metoprolol (LOPRESSOR) injection 5 mg  5 mg IntraVENous Q6H PRN    glucose chewable tablet 16 g  4 Tab Oral PRN    dextrose (D50W) injection syrg 12.5-25 g  12.5-25 g IntraVENous PRN    glucagon (GLUCAGEN) injection 1 mg  1 mg IntraMUSCular PRN         Objective:      Physical Exam:  Visit Vitals  BP (!) 145/54   Pulse 75   Temp 98 °F (36.7 °C)   Resp 18   Ht 5' 3\" (1.6 m)   Wt 304 lb 8 oz (138.1 kg)   SpO2 95%   BMI 53.94 kg/m²     General Appearance:  Well developed, well nourished,alert and oriented x 3, and individual in no acute distress. Ears/Nose/Mouth/Throat:   Hearing grossly normal.         Neck: Supple. Chest:   Lungs clear to auscultation bilaterally. Cardiovascular:  Regular rate and rhythm, S1, S2 normal, no murmur. Abdomen:   Soft, non-tender, bowel sounds are active. Extremities: No edema bilaterally. Skin: Warm and dry.                Data Review:   Labs:    Recent Results (from the past 24 hour(s))   GLUCOSE, POC    Collection Time: 11/22/20 11:36 AM   Result Value Ref Range    Glucose (POC) 187 (H) 65 - 100 mg/dL    Performed by Dylon Moreira    GLUCOSE, POC    Collection Time: 11/22/20  4:40 PM   Result Value Ref Range    Glucose (POC) 164 (H) 65 - 100 mg/dL    Performed by Franco Nino RN    GLUCOSE, POC    Collection Time: 11/22/20  9:33 PM   Result Value Ref Range    Glucose (POC) 144 (H) 65 - 100 mg/dL    Performed by Surya Gomez RN    PROTHROMBIN TIME + INR    Collection Time: 11/23/20  3:25 AM   Result Value Ref Range    INR 1.4 (H) 0.9 - 1.1      Prothrombin time 14.8 (H) 9.0 - 11.1 sec   D DIMER    Collection Time: 11/23/20  3:25 AM   Result Value Ref Range    D-dimer 0.72 (H) 0.00 - 0.65 mg/L FEU   CBC W/O DIFF    Collection Time: 11/23/20  3:25 AM   Result Value Ref Range    WBC 7.7 3.6 - 11.0 K/uL    RBC 4.22 3.80 - 5.20 M/uL    HGB 13.3 11.5 - 16.0 g/dL    HCT 42.8 35.0 - 47.0 %    .4 (H) 80.0 - 99.0 FL    MCH 31.5 26.0 - 34.0 PG    MCHC 31.1 30.0 - 36.5 g/dL    RDW 13.2 11.5 - 14.5 %    PLATELET 105 (L) 150 - 400 K/uL    NRBC 0.0 0  WBC    ABSOLUTE NRBC 0.00 0.00 - 0.01 K/uL   FIBRINOGEN    Collection Time: 11/23/20  3:25 AM   Result Value Ref Range    Fibrinogen 260 200 - 475 mg/dL   SAMPLES BEING HELD    Collection Time: 11/23/20  3:25 AM   Result Value Ref Range    SAMPLES BEING HELD 1BLU     COMMENT        Add-on orders for these samples will be processed based on acceptable specimen integrity and analyte stability, which may vary by analyte.    METABOLIC PANEL, BASIC    Collection Time: 11/23/20  4:47 AM   Result Value Ref Range    Sodium 137 136 - 145 mmol/L    Potassium 3.6 3.5 - 5.1 mmol/L    Chloride 94 (L) 97 - 108 mmol/L    CO2 42 (HH) 21 - 32 mmol/L    Anion gap 1 (L) 5 - 15 mmol/L    Glucose 155 (H) 65 - 100 mg/dL    BUN 37 (H) 6 - 20 MG/DL    Creatinine 0.61 0.55 - 1.02 MG/DL    BUN/Creatinine ratio 61 (H) 12 - 20      GFR est AA >60 >60 ml/min/1.73m2    GFR est non-AA >60 >60 ml/min/1.73m2    Calcium 9.7 8.5 - 10.1 MG/DL   GLUCOSE, POC    Collection Time: 11/23/20  7:18 AM   Result Value Ref Range    Glucose (POC) 131 (H) 65 - 100 mg/dL    Performed by [place] RN    GLUCOSE, POC    Collection Time: 11/23/20  7:50 AM   Result Value Ref Range    Glucose (POC) 137 (H) 65 - 100 mg/dL    Performed by Beverley Reddy        Telemetry: normal sinus rhythm      Assessment:     Hospital Problems  Date Reviewed: 11/20/2020          Codes Class Noted POA    Atrial flutter with rapid ventricular response ([facility] [address] ICD-10-CM: I48.92  ICD-9-CM: 427.32  11/20/2020 Unknown        JOSLYN (obstructive sleep apnea) ICD-10-CM: L61.18  ICD-9-CM: 327.23  11/20/2020 Unknown        * (Principal) Acute on chronic respiratory failure with hypoxia and hypercapnia (HCC) ICD-10-CM: J96.21, J96.22  ICD-9-CM: 518.84, 786.09, 799.02  11/20/2020 Unknown        Acute respiratory failure (HCC) ICD-10-CM: J96.00  ICD-9-CM: 518.81  11/18/2020 Unknown        Mixed hyperlipidemia (Chronic) ICD-10-CM: L52.1  ICD-9-CM: 272.2  11/15/2020 Yes        Morbid obesity ([facility] [address] ICD-10-CM: E66.01  ICD-9-CM: 278.01  11/15/2020 Yes              Plan: For ablation today. Medical therapy based on procedure results.     Haresh Leyva MD

## 2020-11-23 NOTE — PROGRESS NOTES
RAMO Plan  -2nd IM letter needed before discharge  -BLS Transport needed at d/c      CM reviewed patient chart, Patient scheduled to have ablation procedure today. Patient will need BLS transport at discharge. Patient is on oxygen; No ramp at home and there are 4 steps outside and 1 step inside for patient to be transported; patient is bed bound and her wheelchair does not fit between doorways to her bedroom.     CINDY BurgessN, RN, [address]

## 2020-11-23 NOTE — PROGRESS NOTES
Hospitalist Progress Note    NAME: Rashad Fernandes   :  1960   MRN:  [id number]     Interim Hospital Summary: 63 y.o. female whom presented on 2020 with      Assessment / Plan:    Acute on chronic hypoxic and hypercapnic respiratory failure  Acute metabolic encephalopathy, resolved  Suspect COPD exacerbation (unknown FEV1)  Hx Tobacco abuse  -CXR There is increasing interstitial and vague airspace opacities in the lungs may represent edema versus atypical pneumonia. Cardiomegaly appears stable allowing for differences in technique  -SARS COV2 rapid NEG, PCR NEG  -baseline 3-4L oxgyen  -mental status improved with BIPAP support - use at HS prn  -completed course levaquin   -taper steroids.  -diamox prn. Serum C02 has come down.    -appreciate pulmonary input. Recommend PFT and try get outpatient sleep study but patient does not walk or get out of there house. Afib RVR  -not on anticoagulation as an outpatient due to compliance issues, cont ASA  -now on cardizem infusion with addition of amiodarone   -cardiology planning YANNA/AFL ablation today. DM2  -SSI prn    Constipation  -miralax and pericolace    40 or above Morbid obesity / Body mass index is 53.94 kg/m². Code status: Full  Prophylaxis: Lovenox  Recommended Disposition: Home w/Family       Subjective:     Chief Complaint / Reason for Physician Visit  Follow up of AMS, COPD, OHS, Afib and encephalopathy  Chart reviewed in detail. Discussed with RN events overnight.    Leg swelling about same    Review of Systems:  Symptom Y/N Comments  Symptom Y/N Comments   Fever/Chills    Chest Pain     Poor Appetite    Edema y    Cough    Abdominal Pain     Sputum    Joint Pain     SOB/CHOPRA   improved  Pruritis/Rash     Nausea/vomit    Tolerating PT/OT     Diarrhea    Tolerating Diet     Constipation    Other       Could NOT obtain due to:      PO intake:   Patient Vitals for the past 72 hrs: % Diet Eaten   11/22/20 1223 100 %   11/22/20 0912 100 %   11/20/20 1445 100 %   11/20/20 1315 100 %   11/20/20 1100 100 %     Objective:     VITALS:   Last 24hrs VS reviewed since prior progress note. Most recent are:  Patient Vitals for the past 24 hrs:   Temp Pulse Resp BP SpO2   11/23/20 0720 98.2 °F (36.8 °C) 77 18 (!) 124/56 (!) 87 %   11/23/20 0341 97.7 °F (36.5 °C) 67 18 (!) 120/45 94 %   11/23/20 0302     93 %   11/22/20 2312     96 %   11/22/20 2217 98.2 °F (36.8 °C) 72 20 (!) 112/53 90 %   11/22/20 1918 98.2 °F (36.8 °C) 82 18 101/84 98 %   11/22/20 1628 98.6 °F (37 °C) 76 18 123/87 94 %   11/22/20 1347 98.5 °F (36.9 °C) 82 18 133/63 96 %   11/22/20 1029 98.6 °F (37 °C) 80 18 (!) 121/56 94 %       Intake/Output Summary (Last 24 hours) at 11/23/2020 0931  Last data filed at 11/22/2020 1223  Gross per 24 hour   Intake 200 ml   Output    Net 200 ml        I had a face to face encounter, and independently examined this patient on 11/23/2020, as outlined below:  PHYSICAL EXAM:  General: WD, WN. Alert, cooperative, no acute distress    EENT:  EOMI. Anicteric sclerae. MMM  Resp:  Diminished breath sounds. No accessory muscle use  CV:  Irregular  rhythm,  (+) edema  GI:  Soft, Non distended, Non tender. +Bowel sounds  Neurologic:  Alert and oriented X 3, normal speech,   Psych:   Good insight. Not anxious nor agitated  Skin:  No rashes.   No jaundice    Reviewed most current lab test results and cultures  YES  Reviewed most current radiology test results   YES  Review and summation of old records today    NO  Reviewed patient's current orders and MAR    YES  PMH/SH reviewed - no change compared to H&P  ________________________________________________________________________  Care Plan discussed with:    Comments   Patient x    Family      RN x    Care Manager     Consultant                        Multidiciplinary team rounds were held today with , nursing, pharmacist and clinical coordinator. Patient's plan of care was discussed; medications were reviewed and discharge planning was addressed. ________________________________________________________________________  Total NON critical care TIME:  25   Minutes    Total CRITICAL CARE TIME Spent:   Minutes non procedure based      Comments   >50% of visit spent in counseling and coordination of care      This includes time during multidisciplinary rounds if indicated above   ________________________________________________________________________  Yusra Mcgrath MD     Procedures: see electronic medical records for all procedures/Xrays and details which were not copied into this note but were reviewed prior to creation of Plan. LABS:  I reviewed today's most current labs and imaging studies.   Pertinent labs include:  Recent Labs     11/23/20  0325 11/21/20  0333   WBC 7.7 7.6   HGB 13.3 13.4   HCT 42.8 43.5   * 133*     Recent Labs     11/23/20  0447 11/23/20  0325 11/22/20  0438 11/21/20  0333     --  138 135*   K 3.6  --  3.8 3.7   CL 94*  --  94* 95*   CO2 42*  --  42* 38*   *  --  215* 163*   BUN 37*  --  48* 39*   CREA 0.61  --  0.67 0.60   CA 9.7  --  9.7 9.7   MG  --   --  2.5*  --    INR  --  1.4* 1.4* 1.3* Mart-1 - Positive Histology Text: MART-1 staining demonstrates areas of higher density and clustering of melanocytes with Pagetoid spread upwards within the epidermis. The surgical margins are positive for tumor cells.

## 2020-11-23 NOTE — PROGRESS NOTES
Problem: Chronic Obstructive Pulmonary Disease (COPD)  Goal: *Oxygen saturation during activity within specified parameters  Outcome: Progressing Towards Goal  Goal: *Able to remain out of bed as prescribed  Outcome: Progressing Towards Goal  Goal: *Absence of hypoxia  Outcome: Progressing Towards Goal  Goal: *Optimize nutritional status  Outcome: Progressing Towards Goal     Problem: Patient Education: Go to Patient Education Activity  Goal: Patient/Family Education  Outcome: Progressing Towards Goal     Problem: Breathing Pattern - Ineffective  Goal: *Absence of hypoxia  Outcome: Progressing Towards Goal  Goal: *Use of effective breathing techniques  Outcome: Progressing Towards Goal     Problem: Patient Education: Go to Patient Education Activity  Goal: Patient/Family Education  Outcome: Progressing Towards Goal     Problem: Falls - Risk of  Goal: *Absence of Falls  Description: Document Anna Fall Risk and appropriate interventions in the flowsheet. Outcome: Progressing Towards Goal  Note: Fall Risk Interventions:       Mentation Interventions: Toileting rounds    Medication Interventions: Utilize gait belt for transfers/ambulation    Elimination Interventions: Call light in reach    History of Falls Interventions: Utilize gait belt for transfer/ambulation         Problem: Patient Education: Go to Patient Education Activity  Goal: Patient/Family Education  Outcome: Progressing Towards Goal     Problem: Pressure Injury - Risk of  Goal: *Prevention of pressure injury  Description: Document Gaston Scale and appropriate interventions in the flowsheet.   Outcome: Progressing Towards Goal  Note: Pressure Injury Interventions:  Sensory Interventions: Minimize linen layers    Moisture Interventions: Check for incontinence Q2 hours and as needed    Activity Interventions: Assess need for specialty bed, PT/OT evaluation    Mobility Interventions: HOB 30 degrees or less    Nutrition Interventions: Document food/fluid/supplement intake    Friction and Shear Interventions: HOB 30 degrees or less                Problem: Patient Education: Go to Patient Education Activity  Goal: Patient/Family Education  Outcome: Progressing Towards Goal

## 2020-11-23 NOTE — CONSULTS
[facility]  Pulmonary, Critical Care, and Sleep Medicine    Patient Consult    Name: Miriam Marie MRN: [id number]   : 1960 Hospital: [facility]   Date: 2020           IMPRESSION:   · SVT: heart rate of 150. ? Aflutter or fib. ON Dilt drip, but still with increased HR. For ablation today per Dr. Demetri Olson.   · Abnormal CXR: may be pulmonary edema or atypical pneumonia. · Acute on chronic hypoxemic (3-4 l at baseline), hypercapnic respiratory failure - better after treatment with bipap. Now seems to be back near baseline. · Rapid Covid test was negative in ER. · Encephalopathy (for several days prior to admission. Now better. · Suspected COPD exacerbation. History of tobacco abuse, possible COPD but unknown FEV1,   · Probable JOSLYN/OHV   · CHF exacerbation   · Afib -paroxysmal. Not anticoagulated to to medical compliance issues. · Morbid obesity   · Noncompliance   · Hyperglycemia due to steroid use. · Debility -severe DJD of knees. · Daughter: Trey Cheng: [facility]; [phone]. · Full Code.        RECOMMENDATIONS:   · For Cardiology eval for Afib with RVR. For afib ablation. · Bipap whenever she is asleep at night. · Can follow ABGs as needed and will recheck in am.   · Can try and get bedside PFTs prior to discharge. · Agree with diuresis, on diamox for now. · Afib per cards Dilt for rate control. · Duonebs  · Glycemic control. · On empiric levofloxacin  · ON Enoxaparin  · Will follow along with your for now. · We can see her in outpatient follow up via telemedicine since she can't come and see us in person. We can arrange to see her in pulm and sleep clinics after d/c. Subjective:     Seen this am.   Feels pretty good this am. Dry mouth. For afib ablation today. NO chest pain, no back pain, no GERD.    Has been with an increased Heart rate since last pm.     This patient has been seen and evaluated at the request of  Javid Moreno for above. Patient is a 63 y.o. female who presented to ER for evaluation of worsening dyspnea. She is well know to myself. When seen this am. She was off the bipap. Seems to be conversant and comfortable. Had been discharged about 2 weeks ago with similar complaints. Denies chest pain, no back pain, no leg pain. NO headaches. Not able to get additional hx from pt. Does report ongoing right knee pain. Per EHR: 11/18: admitted through ER. Family called EMS due to pt have respiratory difficulty. Pt was on RA with pox of 80%. She was placed on 10L NRB and transported to ER. Was also complaining or right knee pain. Has really severe knee pain per her report at baseline. Past Medical History:   Diagnosis Date    Acute on chronic respiratory failure with hypoxia and hypercapnia (HCC) 11/20/2020    Atrial fibrillation ([facility] 11/4/2020    [facility]congestive [facility] failure) [facility]     Diabetes ([facility]     Hypertension     Ill-defined condition     high cholesterol    Ill-defined condition     tachycardia    JOSLYN (obstructive sleep apnea) 11/20/2020    Other and unspecified symptoms and signs involving general sensations and perceptions     ruptured disc    S/P cardiac cath 11/4/2020    11/3/2020 nonobstructive disease      No past surgical history on file. Prior to Admission medications    Medication Sig Start Date End Date Taking? Authorizing Provider   aspirin 81 mg chewable tablet Take 1 Tab by mouth daily. 11/5/20  Yes Marlene Drake MD   OXYGEN-AIR DELIVERY SYSTEMS 3 L/min by Nasal route continuous. 8/31/20  Yes Velma Joaquin NP   atorvastatin (LIPITOR) 10 mg tablet Take 10 mg by mouth nightly. Yes Other, MD Russ   metoprolol (LOPRESSOR) 25 mg tablet Take 25 mg by mouth two (2) times a day. Yes Other, MD Russ   menthol-zinc oxide (Moisture Barrier Ointment) 0.44-20.6 % oint Apply 1 Each to affected area three (3) times daily.  thin layer moisture associated lesion Provider, Historical   dilTIAZem ER (CARDIZEM CD) 120 mg capsule Take 1 Cap by mouth daily. 11/5/20   Sherwin Henry MD   bismuth subsalicylate (PEPTO-BISMOL MAXIMUM STRENGTH) 525 mg/15 mL susp Take 30 mL by mouth daily as needed for Diarrhea or Nausea. Provider, Historical   acetaminophen (TYLENOL) 325 mg tablet Take 650 mg by mouth every six (6) hours as needed for Pain. Provider, Historical   furosemide (LASIX) 40 mg tablet Take 1 Tab by mouth daily. 8/7/20   Diamond Ford MD     Allergies   Allergen Reactions    Cefazolin Rash    Other Plant, Animal, Environmental Rash     Ivory soap caused rash on hands. Social History     Tobacco Use    Smoking status: Former Smoker    Smokeless tobacco: Never Used   Substance Use Topics    Alcohol use: No      No family history on file. Current Facility-Administered Medications   Medication Dose Route Frequency    predniSONE (DELTASONE) tablet 30 mg  30 mg Oral DAILY WITH BREAKFAST    amiodarone (CORDARONE) tablet 400 mg  400 mg Oral TID    dilTIAZem IR (CARDIZEM) tablet 60 mg  60 mg Oral AC&HS    insulin lispro (HUMALOG) injection   SubCUTAneous AC&HS    miconazole (SECURA) 2 % extra thick cream   Topical BID    aspirin chewable tablet 81 mg  81 mg Oral DAILY    atorvastatin (LIPITOR) tablet 10 mg  10 mg Oral QHS    [Held by provider] furosemide (LASIX) tablet 40 mg  40 mg Oral DAILY    zinc oxide-cod liver oil (DESITIN) 40 % paste 1 g  1 g Topical TID    [Held by provider] metoprolol tartrate (LOPRESSOR) tablet 25 mg  25 mg Oral BID    sodium chloride (NS) flush 5-40 mL  5-40 mL IntraVENous Q8H       Review of Systems:  A comprehensive review of systems was negative. Except as noted above in the HPI.      Objective:   Vital Signs:    Visit Vitals  BP (!) 124/56 (BP 1 Location: Right arm, BP Patient Position: At rest)   Pulse 77   Temp 98.2 °F (36.8 °C)   Resp 18   Ht 5' 3\" (1.6 m)   Wt 138.1 kg (304 lb 8 oz)   SpO2 (!) 87%   BMI 53.94 kg/m² O2 Device: Nasal cannula   O2 Flow Rate (L/min): 4 l/min   Temp (24hrs), Av.3 °F (36.8 °C), Min:97.7 °F (36.5 °C), Max:98.6 °F (37 °C)       Intake/Output:   Last shift:      No intake/output data recorded. Last 3 shifts:  1901 -  0700  In: 1927.8 [P.O.:440; I.V.:1487.8]  Out: -     Intake/Output Summary (Last 24 hours) at 2020 0840  Last data filed at 2020 1223  Gross per 24 hour   Intake 440 ml   Output    Net 440 ml      Physical Exam:   General:  Alert, cooperative, no distress, appears stated age. Seems unkempt. Dirty fingernails. ON NC oxygen. Head:  Normocephalic, without obvious abnormality, atraumatic. Eyes:  Conjunctivae/corneas clear. PERRL, EOMs intact. Nose: Nares normal. Septum midline. Mucosa normal. No drainage or sinus tenderness. Throat: Lips, mucosa, and tongue normal. Teeth and gums normal.   Neck: Supple, symmetrical, trachea midline, no adenopathy, thyroid: no enlargment/tenderness/nodules, no carotid bruit and no JVD. Back:   Symmetric, no curvature. ROM normal.   Lungs:   Clear to auscultation bilaterally. Comfortable. Chest wall:  No tenderness or deformity. Heart:  Regular rate and rhythm, S1, S2 normal, no murmur, click, rub or gallop. Abdomen:   Soft, non-tender. Bowel sounds normal. No masses,  No organomegaly. Extremities: Extremities normal, atraumatic, no cyanosis. Mild redness on dorsum of right foot. Pulses: 2+ and symmetric all extremities. Skin: Skin color, texture, turgor normal. No rashes or lesions   Lymph nodes: Cervical, supraclavicular, and axillary nodes normal.   Neurologic: Grossly nonfocal, motor seems intact.       Data review:     Recent Results (from the past 24 hour(s))   GLUCOSE, POC    Collection Time: 20 11:36 AM   Result Value Ref Range    Glucose (POC) 187 (H) 65 - 100 mg/dL    Performed by Taran Hewitt, POC    Collection Time: 20  4:40 PM   Result Value Ref Range    Glucose (POC) 164 (H) 65 - 100 mg/dL    Performed by Flakito Piedra RN    GLUCOSE, POC    Collection Time: 11/22/20  9:33 PM   Result Value Ref Range    Glucose (POC) 144 (H) 65 - 100 mg/dL    Performed by Edwin Suh RN    PROTHROMBIN TIME + INR    Collection Time: 11/23/20  3:25 AM   Result Value Ref Range    INR 1.4 (H) 0.9 - 1.1      Prothrombin time 14.8 (H) 9.0 - 11.1 sec   D DIMER    Collection Time: 11/23/20  3:25 AM   Result Value Ref Range    D-dimer 0.72 (H) 0.00 - 0.65 mg/L FEU   CBC W/O DIFF    Collection Time: 11/23/20  3:25 AM   Result Value Ref Range    WBC 7.7 3.6 - 11.0 K/uL    RBC 4.22 3.80 - 5.20 M/uL    HGB 13.3 11.5 - 16.0 g/dL    HCT 42.8 35.0 - 47.0 %    .4 (H) 80.0 - 99.0 FL    MCH 31.5 26.0 - 34.0 PG    MCHC 31.1 30.0 - 36.5 g/dL    RDW 13.2 11.5 - 14.5 %    PLATELET 837 (L) 790 - 400 K/uL    NRBC 0.0 0  WBC    ABSOLUTE NRBC 0.00 0.00 - 0.01 K/uL   FIBRINOGEN    Collection Time: 11/23/20  3:25 AM   Result Value Ref Range    Fibrinogen 260 200 - 475 mg/dL   SAMPLES BEING HELD    Collection Time: 11/23/20  3:25 AM   Result Value Ref Range    SAMPLES BEING HELD 1BLU     COMMENT        Add-on orders for these samples will be processed based on acceptable specimen integrity and analyte stability, which may vary by analyte.    METABOLIC PANEL, BASIC    Collection Time: 11/23/20  4:47 AM   Result Value Ref Range    Sodium 137 136 - 145 mmol/L    Potassium 3.6 3.5 - 5.1 mmol/L    Chloride 94 (L) 97 - 108 mmol/L    CO2 42 (HH) 21 - 32 mmol/L    Anion gap 1 (L) 5 - 15 mmol/L    Glucose 155 (H) 65 - 100 mg/dL    BUN 37 (H) 6 - 20 MG/DL    Creatinine 0.61 0.55 - 1.02 MG/DL    BUN/Creatinine ratio 61 (H) 12 - 20      GFR est AA >60 >60 ml/min/1.73m2    GFR est non-AA >60 >60 ml/min/1.73m2    Calcium 9.7 8.5 - 10.1 MG/DL   GLUCOSE, POC    Collection Time: 11/23/20  7:18 AM   Result Value Ref Range    Glucose (POC) 131 (H) 65 - 100 mg/dL    Performed by Edwin Suh RN    GLUCOSE, POC Collection Time: 11/23/20  7:50 AM   Result Value Ref Range    Glucose (POC) 137 (H) 65 - 100 mg/dL    Performed by Nolberto Galloway        Imaging:  I have personally reviewed the patients radiographs and have reviewed the reports:  11-18-20: CXR: IMPRESSION: There is increasing interstitial and vague airspace opacities in the  lungs may represent edema versus atypical pneumonia. .. Cardiomegaly appears  stable allowing for differences in technique. Deborah Agrawal MD

## 2020-11-23 NOTE — PROGRESS NOTES
Pt had AF during the case and failed cardioversion times 4. Finally, successfully cardiovert to sinus with 360J biphasic synchronized shock. Chances of AF in her are high - mod-sev LAE with supermorbid obesity and JOSLYN    Cont amio and oac. If she AF recurs, will need to consider av node/ppm if HR cannot be controlled    Thank you for allowing me to participate in this patients care.     Camilo Pena MD, Nay Aggarwal

## 2020-11-24 VITALS
WEIGHT: 293 LBS | DIASTOLIC BLOOD PRESSURE: 74 MMHG | SYSTOLIC BLOOD PRESSURE: 110 MMHG | HEART RATE: 93 BPM | TEMPERATURE: 98.4 F | OXYGEN SATURATION: 94 % | BODY MASS INDEX: 51.91 KG/M2 | HEIGHT: 63 IN | RESPIRATION RATE: 20 BRPM

## 2020-11-24 LAB
BACTERIA SPEC CULT: NORMAL
BACTERIA SPEC CULT: NORMAL
D DIMER PPP FEU-MCNC: 0.8 MG/L FEU (ref 0–0.65)
FIBRINOGEN PPP-MCNC: 215 MG/DL (ref 200–475)
GLUCOSE BLD STRIP.AUTO-MCNC: 107 MG/DL (ref 65–100)
GLUCOSE BLD STRIP.AUTO-MCNC: 137 MG/DL (ref 65–100)
INR PPP: 1.4 (ref 0.9–1.1)
PROTHROMBIN TIME: 14.6 SEC (ref 9–11.1)
SERVICE CMNT-IMP: ABNORMAL
SERVICE CMNT-IMP: ABNORMAL
SERVICE CMNT-IMP: NORMAL
SERVICE CMNT-IMP: NORMAL

## 2020-11-24 PROCEDURE — 3331090002 HH PPS REVENUE DEBIT

## 2020-11-24 PROCEDURE — 77010033678 HC OXYGEN DAILY

## 2020-11-24 PROCEDURE — 82962 GLUCOSE BLOOD TEST: CPT

## 2020-11-24 PROCEDURE — 99232 SBSQ HOSP IP/OBS MODERATE 35: CPT | Performed by: INTERNAL MEDICINE

## 2020-11-24 PROCEDURE — 94640 AIRWAY INHALATION TREATMENT: CPT

## 2020-11-24 PROCEDURE — 5A2204Z RESTORATION OF CARDIAC RHYTHM, SINGLE: ICD-10-PCS | Performed by: INTERNAL MEDICINE

## 2020-11-24 PROCEDURE — 74011250637 HC RX REV CODE- 250/637: Performed by: INTERNAL MEDICINE

## 2020-11-24 PROCEDURE — 85610 PROTHROMBIN TIME: CPT

## 2020-11-24 PROCEDURE — 3331090001 HH PPS REVENUE CREDIT

## 2020-11-24 PROCEDURE — 85384 FIBRINOGEN ACTIVITY: CPT

## 2020-11-24 PROCEDURE — 36415 COLL VENOUS BLD VENIPUNCTURE: CPT

## 2020-11-24 PROCEDURE — 74011636637 HC RX REV CODE- 636/637: Performed by: INTERNAL MEDICINE

## 2020-11-24 PROCEDURE — 85379 FIBRIN DEGRADATION QUANT: CPT

## 2020-11-24 PROCEDURE — 74011000250 HC RX REV CODE- 250: Performed by: INTERNAL MEDICINE

## 2020-11-24 RX ORDER — POLYETHYLENE GLYCOL 3350 17 G/17G
17 POWDER, FOR SOLUTION ORAL DAILY
Qty: 30 PACKET | Refills: 0 | Status: SHIPPED | OUTPATIENT
Start: 2020-11-25 | End: 2020-12-25

## 2020-11-24 RX ORDER — AMIODARONE HYDROCHLORIDE 200 MG/1
200 TABLET ORAL DAILY
Status: DISCONTINUED | OUTPATIENT
Start: 2020-11-24 | End: 2020-11-24 | Stop reason: HOSPADM

## 2020-11-24 RX ORDER — AMIODARONE HYDROCHLORIDE 200 MG/1
200 TABLET ORAL DAILY
Qty: 30 TAB | Refills: 1 | Status: SHIPPED | OUTPATIENT
Start: 2020-11-25 | End: 2021-01-24

## 2020-11-24 RX ORDER — MAG HYDROX/ALUMINUM HYD/SIMETH 200-200-20
30 SUSPENSION, ORAL (FINAL DOSE FORM) ORAL
Status: DISCONTINUED | OUTPATIENT
Start: 2020-11-24 | End: 2020-11-24 | Stop reason: HOSPADM

## 2020-11-24 RX ORDER — PREDNISONE 10 MG/1
TABLET ORAL
Qty: 20 TAB | Refills: 0 | Status: SHIPPED | OUTPATIENT
Start: 2020-11-24 | End: 2020-12-08

## 2020-11-24 RX ADMIN — ASPIRIN 81 MG CHEWABLE TABLET 81 MG: 81 TABLET CHEWABLE at 08:29

## 2020-11-24 RX ADMIN — IPRATROPIUM BROMIDE AND ALBUTEROL SULFATE 3 ML: .5; 3 SOLUTION RESPIRATORY (INHALATION) at 09:47

## 2020-11-24 RX ADMIN — ACETAMINOPHEN 650 MG: 325 TABLET ORAL at 11:09

## 2020-11-24 RX ADMIN — ALUMINUM HYDROXIDE, MAGNESIUM HYDROXIDE, AND SIMETHICONE 30 ML: 200; 200; 20 SUSPENSION ORAL at 12:32

## 2020-11-24 RX ADMIN — Medication 1 G: at 08:37

## 2020-11-24 RX ADMIN — POLYETHYLENE GLYCOL 3350 17 G: 17 POWDER, FOR SOLUTION ORAL at 08:29

## 2020-11-24 RX ADMIN — Medication 10 ML: at 06:00

## 2020-11-24 RX ADMIN — DOCUSATE SODIUM 50MG AND SENNOSIDES 8.6MG 1 TABLET: 8.6; 5 TABLET, FILM COATED ORAL at 08:29

## 2020-11-24 RX ADMIN — AMIODARONE HYDROCHLORIDE 200 MG: 200 TABLET ORAL at 11:09

## 2020-11-24 RX ADMIN — PREDNISONE 30 MG: 20 TABLET ORAL at 08:29

## 2020-11-24 RX ADMIN — MICONAZOLE NITRATE: 20 CREAM TOPICAL at 08:37

## 2020-11-24 RX ADMIN — DILTIAZEM HYDROCHLORIDE 60 MG: 60 TABLET, FILM COATED ORAL at 08:29

## 2020-11-24 NOTE — PROGRESS NOTES
1228 Pt in Afib 100-120s, asymptomatic. BP WNL. Murtaza Ann NP made aware. Instructed to watch pt closely.    1402 Converted to NSR 84, resting. 1530 Discussed discharge instructions with patient. All questions answered. R groin ablation site CDI, no hematoma. Pulses palpable. VSS, NSR. IV removed. [phone] Escorted out via stretcher, discharged home via [facility].

## 2020-11-24 NOTE — PROGRESS NOTES
Cardiac Electrophysiology Progress Note            [address] [facility], [place], [address] [facility]  [phone]    11/24/2020 8:46 AM    Admit Date: 11/18/2020    Admit Diagnosis: Acute respiratory failure ([address] [J96.00]    Subjective: Yareli Jones   denies chest pain, chest pressure/discomfort, dyspnea, palpitations.     Visit Vitals  /62   Pulse 85   Temp 97.8 °F (36.6 °C)   Resp 20   Ht 5' 3\" (1.6 m)   Wt 304 lb 8 oz (138.1 kg)   SpO2 100%   BMI 53.94 kg/m²     Current Facility-Administered Medications   Medication Dose Route Frequency    polyethylene glycol (MIRALAX) packet 17 g  17 g Oral DAILY    senna-docusate (PERICOLACE) 8.6-50 mg per tablet 1 Tab  1 Tab Oral BID    rivaroxaban (XARELTO) tablet 20 mg  20 mg Oral DAILY WITH DINNER    predniSONE (DELTASONE) tablet 30 mg  30 mg Oral DAILY WITH BREAKFAST    amiodarone (CORDARONE) tablet 400 mg  400 mg Oral TID    dilTIAZem IR (CARDIZEM) tablet 60 mg  60 mg Oral AC&HS    insulin lispro (HUMALOG) injection   SubCUTAneous AC&HS    albuterol-ipratropium (DUO-NEB) 2.5 MG-0.5 MG/3 ML  3 mL Nebulization Q6H PRN    miconazole (SECURA) 2 % extra thick cream   Topical BID    aspirin chewable tablet 81 mg  81 mg Oral DAILY    atorvastatin (LIPITOR) tablet 10 mg  10 mg Oral QHS    [Held by provider] furosemide (LASIX) tablet 40 mg  40 mg Oral DAILY    zinc oxide-cod liver oil (DESITIN) 40 % paste 1 g  1 g Topical TID    [Held by provider] metoprolol tartrate (LOPRESSOR) tablet 25 mg  25 mg Oral BID    sodium chloride (NS) flush 5-40 mL  5-40 mL IntraVENous Q8H    sodium chloride (NS) flush 5-40 mL  5-40 mL IntraVENous PRN    acetaminophen (TYLENOL) tablet 650 mg  650 mg Oral Q6H PRN    Or    acetaminophen (TYLENOL) suppository 650 mg  650 mg Rectal Q6H PRN    polyethylene glycol (MIRALAX) packet 17 g  17 g Oral DAILY PRN    promethazine (PHENERGAN) tablet 12.5 mg  12.5 mg Oral Q6H PRN    Or    ondansetron (ZOFRAN) injection 4 mg  4 mg IntraVENous Q6H PRN    metoprolol (LOPRESSOR) injection 5 mg  5 mg IntraVENous Q6H PRN    glucose chewable tablet 16 g  4 Tab Oral PRN    dextrose (D50W) injection syrg 12.5-25 g  12.5-25 g IntraVENous PRN    glucagon (GLUCAGEN) injection 1 mg  1 mg IntraMUSCular PRN         Objective:      Visit Vitals  /62   Pulse 85   Temp 97.8 °F (36.6 °C)   Resp 20   Ht 5' 3\" (1.6 m)   Wt 304 lb 8 oz (138.1 kg)   SpO2 100%   BMI 53.94 kg/m²       Physical Exam:  Abdomen: soft, non-tender  Extremities: extremities normal  Heart: regular rate and rhythm  Lungs: exp wheezing  Pulses: 2+ and symmetric    Data Review:   Labs:    Recent Labs     11/23/20  0325   WBC 7.7   HGB 13.3   HCT 42.8   *     Recent Labs     11/24/20  0259 11/23/20  0447 11/23/20  0325 11/22/20  0438   NA  --  137  --  138   K  --  3.6  --  3.8   CL  --  94*  --  94*   CO2  --  42*  --  42*   GLU  --  155*  --  215*   BUN  --  37*  --  48*   CREA  --  0.61  --  0.67   CA  --  9.7  --  9.7   MG  --   --   --  2.5*   INR 1.4*  --  1.4* 1.4*       No results for input(s): TROIQ, CPK, CKMB in the last 72 hours. Intake/Output Summary (Last 24 hours) at 11/24/2020 0846  Last data filed at 11/24/2020 0200  Gross per 24 hour   Intake 410 ml   Output 1100 ml   Net -690 ml        Telemetry: SR    Assessment:     Principal Problem:    Acute on chronic respiratory failure with hypoxia and hypercapnia ([facility] (11/20/2020)    Active Problems:    Mixed hyperlipidemia (11/15/2020)      Morbid obesity ([facility] (11/15/2020)      Acute respiratory failure ([facility] [facility] (11/18/2020)      Atrial flutter with rapid ventricular response ([facility] (11/20/2020)      JOSLYN (obstructive sleep apnea) (11/20/2020)        Plan:     Arpita Ross is s/p Counter clockwise isthmus dependent atrial flutter with successful RFA of the CTI with confirmed bi-directional isthmus block. Atrial pacing, on isopreterenol, induced AVNRT.  Successful RFA of the slow pathway, Refractory AF despite numerous cardioversion. 11/24/2020. She remains in sinus overnight, right groin dressing dry and intact. [id number] Kaylin Dia for d/c home on [address] from [facility], RITA Cardenas MD, [facility]  11/24/2020  8:46 AM      Patient seen and examined by me with nurse practitioner. I personally performed all components of the history, physical, and medical decision making and agree with the assessment and plan with minor modifications as noted. In sinus sp afl abl. Cont amio and oac for af. Cont med rx for dory. Will sign off.  Please call with ?s    Hawa Cardenas MD, [facility]

## 2020-11-24 NOTE — DISCHARGE SUMMARY
Hospitalist Discharge Summary     Patient ID:  Natalya Hardwick  [id number]  63 y.o.  1960    PCP on record: Carmine Melvin NP    Admit date: 11/18/2020  Discharge date and time: 11/24/2020      DISCHARGE DIAGNOSIS:    Acute on chronic hypoxic and hypercapnic respiratory failure, resolved  Acute metabolic encephalopathy, resolved  Suspect COPD exacerbation (unknown FEV1)  Hx Tobacco abuse  Refractory Afib with RVR  DM2  Constipation  40 or above Morbid obesity / Body mass index is 53.94 kg/m². CONSULTATIONS:  IP CONSULT TO HOSPITALIST  IP CONSULT TO PULMONOLOGY  IP CONSULT TO CARDIOLOGY    Excerpted HPI from H&P of Cristela Gregory MD:  CHIEF COMPLAINT: Altered mental status     HISTORY OF PRESENT ILLNESS:     Lexie Liz is a 63 y.o.  female with past medical history significant for morbid obesity, COPD, obstructive sleep apnea, paroxysmal atrial fibrillation and diet-controlled diabetes who was recently discharged 2 weeks ago when she was admitted for respiratory failure with hypercapnia and CO2 narcosis. Patient was noted to have rapid atrial fibrillation for which she was started on Cardizem. She has not started on anticoagulation due to noncompliance. She presents with the complaints of altered mental status. Patient herself is on BiPAP and is extremely lethargic, she responds to tactile stimulus. She is unable to provide any history. History is obtained from EMS records and ED physician. According to EMS records EMS was called as patient was noted to be confused and altered mental status. She was noticed to be hypoxic when EMS arrived with oxygen saturation 80% on room air and was placed on nonrebreather. She is brought in the ED where blood gas showed PCO2 greater than 90. Chest x-ray showed bibasilar infiltrates.     ______________________________________________________________________  DISCHARGE SUMMARY/HOSPITAL COURSE:  for full details see H&P, daily progress notes, labs, consult notes. Acute on chronic hypoxic and hypercapnic respiratory failure  Acute metabolic encephalopathy, resolved  Suspect COPD exacerbation (unknown FEV1)  Hx Tobacco abuse  -CXR There is increasing interstitial and vague airspace opacities in the lungs may represent edema versus atypical pneumonia. Cardiomegaly appears stable allowing for differences in technique  -SARS COV2 rapid NEG, PCR NEG  -baseline 3-4L oxgyen  -mental status improved with BIPAP support - tapered off  -completed course levaquin 11/22  -tapering steroids.  -diamox used prn. Serum C02 has come down.    -appreciate pulmonary input. Recommend PFT and try get outpatient sleep study but patient does not walk or get out of there house.       Refractory AF with RVR  -not on anticoagulation as an outpatient due to compliance issues, cont ASA  -now on cardizem infusion with addition of amiodarone   -s/p RFA of slow pathway   -started on xarelto and amiodarone.  -follow up with cardiology      _______________________________________________________________________  Patient seen and examined by me on discharge day.    _______________________________________________________________________  DISCHARGE MEDICATIONS:   Current Discharge Medication List      START taking these medications    Details   amiodarone (CORDARONE) 200 mg tablet Take 1 Tab by mouth daily for 60 days. Qty: 30 Tab, Refills: 1      polyethylene glycol (MIRALAX) 17 gram packet Take 1 Packet by mouth daily for 30 days. Qty: 30 Packet, Refills: 0      predniSONE (DELTASONE) 10 mg tablet 4 tabs daily for 2 days; 3 tabs daily for 2 days; 2 tabs daily for 2 days ;1 tab   daily for 2 days  Qty: 20 Tab, Refills: 0      rivaroxaban (XARELTO) 20 mg tab tablet Take 1 Tab by mouth daily (with dinner) for 60 days.   Qty: 30 Tab, Refills: 1         CONTINUE these medications which have NOT CHANGED    Details   aspirin 81 mg chewable tablet Take 1 Tab by mouth daily.  Qty: 30 Tab, Refills: 0      OXYGEN-AIR DELIVERY SYSTEMS 3 L/min by Nasal route continuous. atorvastatin (LIPITOR) 10 mg tablet Take 10 mg by mouth nightly. metoprolol (LOPRESSOR) 25 mg tablet Take 25 mg by mouth two (2) times a day. menthol-zinc oxide (Moisture Barrier Ointment) 0.44-20.6 % oint Apply 1 Each to affected area three (3) times daily. thin layer moisture associated lesion      acetaminophen (TYLENOL) 325 mg tablet Take 650 mg by mouth every six (6) hours as needed for Pain. furosemide (LASIX) 40 mg tablet Take 1 Tab by mouth daily. Qty: 30 Tab, Refills: 0         STOP taking these medications       dilTIAZem ER (CARDIZEM CD) 120 mg capsule Comments:   Reason for Stopping:         bismuth subsalicylate (PEPTO-BISMOL MAXIMUM STRENGTH) 525 mg/15 mL susp Comments:   Reason for Stopping:               My Recommended Diet, Activity, Wound Care, and follow-up labs are listed in the patient's Discharge Insturctions which I have personally completed and reviewed.   Risk of deterioration: Low    Condition at Discharge:  Stable  _____________________________________________________________________    Disposition  Home with family, no needs  ____________________________________________________________________    Care Plan discussed with:   Patient, Family, RN, Care Manager, Consultant    ____________________________________________________________________    Code Status: Full Code  ____________________________________________________________________      Condition at Discharge:  Stable  _____________________________________________________________________  Follow up with:   PCP : Ramez Washington NP  Follow-up Information     Follow up With Specialties Details Why Contact Info    Ramez Washington NP Nurse Practitioner In 1 week  [address] [facility]      Kaatlina Olivas MD Pulmonary Disease In 2 weeks for virtual visit  [address] [facility] [address]       Viktor Benitez MD Cardiology, Cardio Vascular Surgery, Internal Medicine In 2 weeks  [address] [facility]  [phone]                Total time in minutes spent coordinating this discharge (includes going over instructions, follow-up, prescriptions, and preparing report for sign off to her PCP) :  35 minutes    Signed:  Tara Weber MD

## 2020-11-24 NOTE — PROGRESS NOTES
Cardiology Progress Note      11/24/2020 8:26 AM    Admit Date: 11/18/2020    Admit Diagnosis: Acute respiratory failure ([facility] [address] [J96.00]      Subjective: Valentino Quail has no complaints. In sinus rhythm s/p DCC. Per EP cardioverted. afib ablation not done.     Visit Vitals  /62   Pulse 86   Temp 97.8 °F (36.6 °C)   Resp 20   Ht 5' 3\" (1.6 m)   Wt 304 lb 8 oz (138.1 kg)   SpO2 100%   BMI 53.94 kg/m²       Current Facility-Administered Medications   Medication Dose Route Frequency    polyethylene glycol (MIRALAX) packet 17 g  17 g Oral DAILY    senna-docusate (PERICOLACE) 8.6-50 mg per tablet 1 Tab  1 Tab Oral BID    rivaroxaban (XARELTO) tablet 20 mg  20 mg Oral DAILY WITH DINNER    predniSONE (DELTASONE) tablet 30 mg  30 mg Oral DAILY WITH BREAKFAST    amiodarone (CORDARONE) tablet 400 mg  400 mg Oral TID    dilTIAZem IR (CARDIZEM) tablet 60 mg  60 mg Oral AC&HS    insulin lispro (HUMALOG) injection   SubCUTAneous AC&HS    albuterol-ipratropium (DUO-NEB) 2.5 MG-0.5 MG/3 ML  3 mL Nebulization Q6H PRN    miconazole (SECURA) 2 % extra thick cream   Topical BID    aspirin chewable tablet 81 mg  81 mg Oral DAILY    atorvastatin (LIPITOR) tablet 10 mg  10 mg Oral QHS    [Held by provider] furosemide (LASIX) tablet 40 mg  40 mg Oral DAILY    zinc oxide-cod liver oil (DESITIN) 40 % paste 1 g  1 g Topical TID    [Held by provider] metoprolol tartrate (LOPRESSOR) tablet 25 mg  25 mg Oral BID    sodium chloride (NS) flush 5-40 mL  5-40 mL IntraVENous Q8H    sodium chloride (NS) flush 5-40 mL  5-40 mL IntraVENous PRN    acetaminophen (TYLENOL) tablet 650 mg  650 mg Oral Q6H PRN    Or    acetaminophen (TYLENOL) suppository 650 mg  650 mg Rectal Q6H PRN    polyethylene glycol (MIRALAX) packet 17 g  17 g Oral DAILY PRN    promethazine (PHENERGAN) tablet 12.5 mg  12.5 mg Oral Q6H PRN    Or    ondansetron (ZOFRAN) injection 4 mg  4 mg IntraVENous Q6H PRN    metoprolol (LOPRESSOR) injection 5 mg  5 mg IntraVENous Q6H PRN    glucose chewable tablet 16 g  4 Tab Oral PRN    dextrose (D50W) injection syrg 12.5-25 g  12.5-25 g IntraVENous PRN    glucagon (GLUCAGEN) injection 1 mg  1 mg IntraMUSCular PRN         Objective:      Physical Exam:  Visit Vitals  /62   Pulse 85   Temp 97.8 °F (36.6 °C)   Resp 20   Ht 5' 3\" (1.6 m)   Wt 304 lb 8 oz (138.1 kg)   SpO2 100%   BMI 53.94 kg/m²     General Appearance:  Well developed, well nourished,alert and oriented x 3, and individual in no acute distress. Ears/Nose/Mouth/Throat:   Hearing grossly normal.         Neck: Supple. Chest:   Lungs clear to auscultation bilaterally. Cardiovascular:  Regular rate and rhythm, S1, S2 normal, no murmur. Abdomen:   Soft, non-tender, bowel sounds are active. Extremities: No edema bilaterally. Skin: Warm and dry.                Data Review:   Labs:    Recent Results (from the past 24 hour(s))   GLUCOSE, POC    Collection Time: 11/23/20 11:28 AM   Result Value Ref Range    Glucose (POC) 116 (H) 65 - 100 mg/dL    Performed by Tamika Guo    GLUCOSE, POC    Collection Time: 11/23/20  3:13 PM   Result Value Ref Range    Glucose (POC) 96 65 - 100 mg/dL    Performed by [address] [place] [address], POC    Collection Time: 11/23/20  9:28 PM   Result Value Ref Range    Glucose (POC) 200 (H) 65 - 100 mg/dL    Performed by Angely Mota RN    PROTHROMBIN TIME + INR    Collection Time: 11/24/20  2:59 AM   Result Value Ref Range    INR 1.4 (H) 0.9 - 1.1      Prothrombin time 14.6 (H) 9.0 - 11.1 sec   FIBRINOGEN    Collection Time: 11/24/20  2:59 AM   Result Value Ref Range    Fibrinogen 215 200 - 475 mg/dL   D DIMER    Collection Time: 11/24/20  2:59 AM   Result Value Ref Range    D-dimer 0.80 (H) 0.00 - 0.65 mg/L FEU   GLUCOSE, POC    Collection Time: 11/24/20  7:30 AM   Result Value Ref Range    Glucose (POC) 107 (H) 65 - 100 mg/dL    Performed by Nidhi Lopez RN        Telemetry: normal sinus rhythm      Assessment:     Hospital Problems  Date Reviewed: 11/20/2020          Codes Class Noted POA    Atrial flutter with rapid ventricular response ([facility] ICD-10-CM: I48.92  ICD-9-CM: 427.32  11/20/2020 Unknown        JOSLYN (obstructive sleep apnea) ICD-10-CM: G47.33  ICD-9-CM: 327.23  11/20/2020 Unknown        * (Principal) Acute on chronic respiratory failure with hypoxia and hypercapnia (HCC) ICD-10-CM: J96.21, J96.22  ICD-9-CM: 518.84, 786.09, 799.02  11/20/2020 Unknown        Acute respiratory failure ([facility] [address] ICD-10-CM: J96.00  ICD-9-CM: 518.81  11/18/2020 Unknown        Mixed hyperlipidemia (Chronic) ICD-10-CM: E78.2  ICD-9-CM: 272.2  11/15/2020 Yes        Morbid obesity ([facility] ICD-10-CM: E66.01  ICD-9-CM: 278.01  11/15/2020 Yes              Plan:     Start amiodarone per EP recomendation. Continue OAC, metoprolol. D/c cardizem. F/u 2-3 weeks.     Ainsley Jewell MD

## 2020-11-24 NOTE — PROGRESS NOTES
1700 Bedside shift change report given to Dona Braga RN (oncoming nurse) by Samantha Head RN (offgoing nurse). Report included the following information SBAR, MAR, Recent Results and Cardiac Rhythm NSR. Patient resting in bed without any complaints. R groin ablation site CDI, no hematoma. Pulses palpable. VSS. BLE edematous. Denies pain, SOB, or dizziness. 1900 Bedside shift change report given to Anna Michael RN (oncoming nurse) by Dona Braga RN (offgoing nurse). Report included the following information SBAR, Kardex, Procedure Summary, Intake/Output, MAR, Recent Results and Cardiac Rhythm NSR.

## 2020-11-24 NOTE — PROGRESS NOTES
RAMO Plan  -BL[facility] transport [facility] set up for 4:00pm  Today  -Follow up appointments made and placed on AVS  -2nd IM Given    Patient cleared from CM standpoint. [facility] scheduled for 4:00 pm today. Patient daughter Bethany Casey informed of patient discharge. Follow up appointments made and placed on patient AVS    Medicare pt has received, reviewed, and signed 2nd IM letter informing them of their right to appeal the discharge. Signed copy has been placed on pt bedside chart.     Kevin Holland, CINDYN, RN, [facility]

## 2020-11-24 NOTE — PROGRESS NOTES
Problem: Chronic Obstructive Pulmonary Disease (COPD)  Goal: *Oxygen saturation during activity within specified parameters  Outcome: Progressing Towards Goal  Goal: *Able to remain out of bed as prescribed  Outcome: Progressing Towards Goal  Goal: *Absence of hypoxia  Outcome: Progressing Towards Goal  Goal: *Optimize nutritional status  Outcome: Progressing Towards Goal     Problem: Patient Education: Go to Patient Education Activity  Goal: Patient/Family Education  Outcome: Progressing Towards Goal     Problem: Breathing Pattern - Ineffective  Goal: *Absence of hypoxia  Outcome: Progressing Towards Goal  Goal: *Use of effective breathing techniques  Outcome: Progressing Towards Goal     Problem: Patient Education: Go to Patient Education Activity  Goal: Patient/Family Education  Outcome: Progressing Towards Goal     Problem: Falls - Risk of  Goal: *Absence of Falls  Description: Document Anna Fall Risk and appropriate interventions in the flowsheet. Outcome: Progressing Towards Goal  Note: Fall Risk Interventions:       Mentation Interventions:  Toileting rounds    Medication Interventions: Patient to call before getting OOB    Elimination Interventions: Call light in reach    History of Falls Interventions: Room close to nurse's station

## 2020-11-24 NOTE — PROGRESS NOTES
[facility]  Pulmonary, Critical Care, and Sleep Medicine    Name: Mauricio Krishna MRN: [id number]   : 1960 Hospital: [address]   Date: 2020           IMPRESSION:   · SVT s/p cardioversion by Dr. Irene Magallanes.   · Abnormal CXR: may be pulmonary edema or atypical pneumonia. · Acute on chronic hypoxemic (3-4 l at baseline), hypercapnic respiratory failure - better after treatment with bipap. Now seems to be back near baseline. · Rapid Covid test was negative in ER. · Encephalopathy (for several days prior to admission. Now better. · Suspected COPD exacerbation. History of tobacco abuse, possible COPD but unknown FEV1,   · Probable JOSLYN/OHV   · CHF exacerbation   · Afib -paroxysmal. Not anticoagulated to to medical compliance issues. · Morbid obesity   · Noncompliance   · Hyperglycemia due to steroid use. · Debility -severe DJD of knees. · Daughter: Lacho Lópezr: [facility]; [phone]. · Full Code.        RECOMMENDATIONS:   · O2 at baseline  · Bronchodilators  · On enoxaparin  · We can see her in outpatient follow up via telemedicine since she can't come and see us in person. We can arrange to see her in pulm and sleep clinics after discharge. Home JOSLYN therapy can not be arranged without a sleep study. Will have the office contact her to arrange. Subjective:     Cardioverted yesterday. In NSR. No new events. Resting comfortably. This patient has been seen and evaluated at the request of Dr. Javid Moreno for above. Patient is a 63 y.o. female who presented to ER for evaluation of worsening dyspnea. She is well know to myself. When seen this am. She was off the bipap. Seems to be conversant and comfortable. Had been discharged about 2 weeks ago with similar complaints. Denies chest pain, no back pain, no leg pain. NO headaches. Not able to get additional hx from pt. Does report ongoing right knee pain.          Per EHR: : admitted through ER. Family called EMS due to pt have respiratory difficulty. Pt was on RA with pox of 80%. She was placed on 10L NRB and transported to ER. Was also complaining or right knee pain. Has really severe knee pain per her report at baseline. Past Medical History:   Diagnosis Date    Acute on chronic respiratory failure with hypoxia and hypercapnia ([facility]) 11/20/2020    Atrial fibrillation ([facility] [address] 11/4/2020    CHF (congestive heart failure) ([facility])     Diabetes ([facility] [address]     Hypertension     Ill-defined condition     high cholesterol    Ill-defined condition     tachycardia    JOSLYN (obstructive sleep apnea) 11/20/2020    Other and unspecified symptoms and signs involving general sensations and perceptions     ruptured disc    S/P cardiac cath 11/4/2020    11/3/2020 nonobstructive disease      No past surgical history on file. Prior to Admission medications    Medication Sig Start Date End Date Taking? Authorizing Provider   aspirin 81 mg chewable tablet Take 1 Tab by mouth daily. 11/5/20  Yes Mj Pham MD   OXYGEN-AIR DELIVERY SYSTEMS 3 L/min by Nasal route continuous. 8/31/20  Yes Pasha Joaquin NP   atorvastatin (LIPITOR) 10 mg tablet Take 10 mg by mouth nightly. Yes Other, MD Russ   metoprolol (LOPRESSOR) 25 mg tablet Take 25 mg by mouth two (2) times a day. Yes Other, MD Russ   menthol-zinc oxide (Moisture Barrier Ointment) 0.44-20.6 % oint Apply 1 Each to affected area three (3) times daily. thin layer moisture associated lesion    Provider, Historical   dilTIAZem ER (CARDIZEM CD) 120 mg capsule Take 1 Cap by mouth daily. 11/5/20   Mj Pham MD   bismuth subsalicylate (PEPTO-BISMOL MAXIMUM STRENGTH) 525 mg/15 mL susp Take 30 mL by mouth daily as needed for Diarrhea or Nausea. Provider, Historical   acetaminophen (TYLENOL) 325 mg tablet Take 650 mg by mouth every six (6) hours as needed for Pain.     Provider, Historical   furosemide (LASIX) 40 mg tablet Take 1 Tab by mouth daily. 20   [place], MD     Allergies   Allergen Reactions    Cefazolin Rash    Other Plant, Animal, Environmental Rash     Ivory soap caused rash on hands. Social History     Tobacco Use    Smoking status: Former Smoker    Smokeless tobacco: Never Used   Substance Use Topics    Alcohol use: No      No family history on file. Current Facility-Administered Medications   Medication Dose Route Frequency    polyethylene glycol (MIRALAX) packet 17 g  17 g Oral DAILY    senna-docusate (PERICOLACE) 8.6-50 mg per tablet 1 Tab  1 Tab Oral BID    rivaroxaban (XARELTO) tablet 20 mg  20 mg Oral DAILY WITH DINNER    predniSONE (DELTASONE) tablet 30 mg  30 mg Oral DAILY WITH BREAKFAST    amiodarone (CORDARONE) tablet 400 mg  400 mg Oral TID    dilTIAZem IR (CARDIZEM) tablet 60 mg  60 mg Oral AC&HS    insulin lispro (HUMALOG) injection   SubCUTAneous AC&HS    miconazole (SECURA) 2 % extra thick cream   Topical BID    aspirin chewable tablet 81 mg  81 mg Oral DAILY    atorvastatin (LIPITOR) tablet 10 mg  10 mg Oral QHS    [Held by provider] furosemide (LASIX) tablet 40 mg  40 mg Oral DAILY    zinc oxide-cod liver oil (DESITIN) 40 % paste 1 g  1 g Topical TID    [Held by provider] metoprolol tartrate (LOPRESSOR) tablet 25 mg  25 mg Oral BID    sodium chloride (NS) flush 5-40 mL  5-40 mL IntraVENous Q8H       Review of Systems:  A comprehensive review of systems was negative. Except as noted above in the HPI. Objective:   Vital Signs:    Visit Vitals  /62   Pulse 85   Temp 97.8 °F (36.6 °C)   Resp 20   Ht 5' 3\" (1.6 m)   Wt 138.1 kg (304 lb 8 oz)   SpO2 100%   BMI 53.94 kg/m²       O2 Device: Nasal cannula, Humidifier   O2 Flow Rate (L/min): 3 l/min   Temp (24hrs), Av.9 °F (36.6 °C), Min:97 °F (36.1 °C), Max:98.8 °F (37.1 °C)       Intake/Output:   Last shift:      No intake/output data recorded.   Last 3 shifts:  1901 -  0700  In: 410 [P.O.:60; I.V.:350]  Out: 1100 [Urine:1100]    Intake/Output Summary (Last 24 hours) at 11/24/2020 0852  Last data filed at 11/24/2020 0200  Gross per 24 hour   Intake 410 ml   Output 1100 ml   Net -690 ml      Physical Exam:   General:  Alert, cooperative, no distress, appears stated age. Seems unkempt. Dirty fingernails. ON NC oxygen. Head:  Normocephalic, without obvious abnormality, atraumatic. Eyes:  Conjunctivae/corneas clear. PERRL, EOMs intact. Nose: Nares normal. Septum midline. Mucosa normal. No drainage or sinus tenderness. Throat: Lips, mucosa, and tongue normal. Teeth and gums normal.   Neck: Supple, symmetrical, trachea midline, no adenopathy, thyroid: no enlargment/tenderness/nodules, no carotid bruit and no JVD. Back:   Symmetric, no curvature. ROM normal.   Lungs:   Clear to auscultation bilaterally. Comfortable. Chest wall:  No tenderness or deformity. Heart:  Regular rate and rhythm, S1, S2 normal, no murmur, click, rub or gallop. Abdomen:   Soft, non-tender. Bowel sounds normal. No masses,  No organomegaly. Extremities: Extremities normal, atraumatic, no cyanosis. Mild redness on dorsum of right foot. Pulses: 2+ and symmetric all extremities. Skin: Skin color, texture, turgor normal. No rashes or lesions   Lymph nodes: Cervical, supraclavicular, and axillary nodes normal.   Neurologic: Grossly nonfocal, motor seems intact.       Data review:     Recent Results (from the past 24 hour(s))   GLUCOSE, POC    Collection Time: 11/23/20 11:28 AM   Result Value Ref Range    Glucose (POC) 116 (H) 65 - 100 mg/dL    Performed by Adilia Gould    GLUCOSE, POC    Collection Time: 11/23/20  3:13 PM   Result Value Ref Range    Glucose (POC) 96 65 - 100 mg/dL    Performed by Adilia Gould    GLUCOSE, POC    Collection Time: 11/23/20  9:28 PM   Result Value Ref Range    Glucose (POC) 200 (H) 65 - 100 mg/dL    Performed by Jeoffrey Blizzard RN    PROTHROMBIN TIME + INR    Collection Time: 11/24/20  2:59 AM Result Value Ref Range    INR 1.4 (H) 0.9 - 1.1      Prothrombin time 14.6 (H) 9.0 - 11.1 sec   FIBRINOGEN    Collection Time: 11/24/20  2:59 AM   Result Value Ref Range    Fibrinogen 215 200 - 475 mg/dL   D DIMER    Collection Time: 11/24/20  2:59 AM   Result Value Ref Range    D-dimer 0.80 (H) 0.00 - 0.65 mg/L FEU   GLUCOSE, POC    Collection Time: 11/24/20  7:30 AM   Result Value Ref Range    Glucose (POC) 107 (H) 65 - 100 mg/dL    Performed by Rosaura Bryant RN        Imaging:  I have personally reviewed the patients radiographs and have reviewed the reports:  11-18-20: CXR: IMPRESSION: There is increasing interstitial and vague airspace opacities in the  lungs may represent edema versus atypical pneumonia. .. Cardiomegaly appears  stable allowing for differences in technique. Roge Sterling MD

## 2020-11-24 NOTE — PROGRESS NOTES
1900-bedside report received from off going MARIE Gonzalez, introductions made, whiteboard updated, pt resting in bed at this time, denies pain, tolerating 4L NC, all needs met at this time, will continue to monitor and act accordingly  0000-pt continues to rest at this time, tolerating BI-PAP machine  0330-all needs met at this time, will monitor  0700-bedside report given to oncoming RN Servando Gonzalez, SBAR reviewed, all questios answered

## 2020-11-25 PROCEDURE — 3331090002 HH PPS REVENUE DEBIT

## 2020-11-25 PROCEDURE — 3331090001 HH PPS REVENUE CREDIT

## 2020-11-26 PROCEDURE — 3331090001 HH PPS REVENUE CREDIT

## 2020-11-26 PROCEDURE — 3331090002 HH PPS REVENUE DEBIT

## 2020-11-27 PROCEDURE — 3331090001 HH PPS REVENUE CREDIT

## 2020-11-27 PROCEDURE — 3331090002 HH PPS REVENUE DEBIT

## 2020-11-28 PROCEDURE — 3331090001 HH PPS REVENUE CREDIT

## 2020-11-28 PROCEDURE — 3331090002 HH PPS REVENUE DEBIT

## 2020-11-29 PROCEDURE — 3331090001 HH PPS REVENUE CREDIT

## 2020-11-29 PROCEDURE — 3331090002 HH PPS REVENUE DEBIT

## 2020-11-30 PROCEDURE — 3331090002 HH PPS REVENUE DEBIT

## 2020-11-30 PROCEDURE — 3331090001 HH PPS REVENUE CREDIT

## 2020-12-01 PROCEDURE — 3331090001 HH PPS REVENUE CREDIT

## 2020-12-01 PROCEDURE — 3331090002 HH PPS REVENUE DEBIT

## 2020-12-02 ENCOUNTER — HOSPITAL ENCOUNTER (INPATIENT)
Age: 60
LOS: 6 days | Discharge: HOME HEALTH | DRG: 190 | End: 2020-12-08
Attending: EMERGENCY MEDICINE | Admitting: INTERNAL MEDICINE
Payer: MEDICARE

## 2020-12-02 ENCOUNTER — APPOINTMENT (OUTPATIENT)
Dept: CT IMAGING | Age: 60
DRG: 190 | End: 2020-12-02
Attending: EMERGENCY MEDICINE
Payer: MEDICARE

## 2020-12-02 ENCOUNTER — APPOINTMENT (OUTPATIENT)
Dept: GENERAL RADIOLOGY | Age: 60
DRG: 190 | End: 2020-12-02
Attending: EMERGENCY MEDICINE
Payer: MEDICARE

## 2020-12-02 DIAGNOSIS — R06.02 SOB (SHORTNESS OF BREATH): ICD-10-CM

## 2020-12-02 DIAGNOSIS — R53.81 PHYSICAL DECONDITIONING: ICD-10-CM

## 2020-12-02 DIAGNOSIS — J96.21 ACUTE ON CHRONIC RESPIRATORY FAILURE WITH HYPOXIA AND HYPERCAPNIA (HCC): ICD-10-CM

## 2020-12-02 DIAGNOSIS — J96.01 ACUTE RESPIRATORY FAILURE WITH HYPOXIA (HCC): Primary | ICD-10-CM

## 2020-12-02 DIAGNOSIS — J96.12 CHRONIC HYPERCAPNIC RESPIRATORY FAILURE (HCC): ICD-10-CM

## 2020-12-02 DIAGNOSIS — Z71.89 COUNSELING REGARDING ADVANCE CARE PLANNING AND GOALS OF CARE: ICD-10-CM

## 2020-12-02 DIAGNOSIS — E66.2 PICKWICKIAN SYNDROME (HCC): ICD-10-CM

## 2020-12-02 DIAGNOSIS — J96.22 ACUTE ON CHRONIC RESPIRATORY FAILURE WITH HYPOXIA AND HYPERCAPNIA (HCC): ICD-10-CM

## 2020-12-02 LAB
ALBUMIN SERPL-MCNC: 2.8 G/DL (ref 3.5–5)
ALBUMIN/GLOB SERPL: 0.6 {RATIO} (ref 1.1–2.2)
ALP SERPL-CCNC: 139 U/L (ref 45–117)
ALT SERPL-CCNC: 61 U/L (ref 12–78)
ANION GAP SERPL CALC-SCNC: ABNORMAL MMOL/L (ref 5–15)
APPEARANCE UR: ABNORMAL
AST SERPL-CCNC: 59 U/L (ref 15–37)
ATRIAL RATE: 72 BPM
BACTERIA URNS QL MICRO: NEGATIVE /HPF
BASOPHILS # BLD: 0 K/UL (ref 0–0.1)
BASOPHILS NFR BLD: 0 % (ref 0–1)
BILIRUB SERPL-MCNC: 0.8 MG/DL (ref 0.2–1)
BILIRUB UR QL: NEGATIVE
BNP SERPL-MCNC: 846 PG/ML
BUN SERPL-MCNC: 24 MG/DL (ref 6–20)
BUN/CREAT SERPL: 36 (ref 12–20)
CALCIUM SERPL-MCNC: 9.2 MG/DL (ref 8.5–10.1)
CALCULATED P AXIS, ECG09: 48 DEGREES
CALCULATED R AXIS, ECG10: 83 DEGREES
CALCULATED T AXIS, ECG11: 52 DEGREES
CHLORIDE SERPL-SCNC: 89 MMOL/L (ref 97–108)
CO2 SERPL-SCNC: >45 MMOL/L (ref 21–32)
COLOR UR: ABNORMAL
COVID-19 RAPID TEST, COVR: NOT DETECTED
CREAT SERPL-MCNC: 0.66 MG/DL (ref 0.55–1.02)
DIAGNOSIS, 93000: NORMAL
DIFFERENTIAL METHOD BLD: ABNORMAL
EOSINOPHIL # BLD: 0.1 K/UL (ref 0–0.4)
EOSINOPHIL NFR BLD: 1 % (ref 0–7)
EPITH CASTS URNS QL MICRO: ABNORMAL /LPF
ERYTHROCYTE [DISTWIDTH] IN BLOOD BY AUTOMATED COUNT: 13.6 % (ref 11.5–14.5)
GLOBULIN SER CALC-MCNC: 4.5 G/DL (ref 2–4)
GLUCOSE SERPL-MCNC: 163 MG/DL (ref 65–100)
GLUCOSE UR STRIP.AUTO-MCNC: NEGATIVE MG/DL
HCT VFR BLD AUTO: 46.2 % (ref 35–47)
HEALTH STATUS, XMCV2T: NORMAL
HGB BLD-MCNC: 13 G/DL (ref 11.5–16)
HGB UR QL STRIP: NEGATIVE
HYALINE CASTS URNS QL MICRO: ABNORMAL /LPF (ref 0–5)
IMM GRANULOCYTES # BLD AUTO: 0.1 K/UL (ref 0–0.04)
IMM GRANULOCYTES NFR BLD AUTO: 1 % (ref 0–0.5)
KETONES UR QL STRIP.AUTO: NEGATIVE MG/DL
LEUKOCYTE ESTERASE UR QL STRIP.AUTO: NEGATIVE
LYMPHOCYTES # BLD: 0.6 K/UL (ref 0.8–3.5)
LYMPHOCYTES NFR BLD: 5 % (ref 12–49)
MCH RBC QN AUTO: 31.2 PG (ref 26–34)
MCHC RBC AUTO-ENTMCNC: 28.1 G/DL (ref 30–36.5)
MCV RBC AUTO: 110.8 FL (ref 80–99)
MONOCYTES # BLD: 0.4 K/UL (ref 0–1)
MONOCYTES NFR BLD: 3 % (ref 5–13)
NEUTS SEG # BLD: 11 K/UL (ref 1.8–8)
NEUTS SEG NFR BLD: 90 % (ref 32–75)
NITRITE UR QL STRIP.AUTO: NEGATIVE
NRBC # BLD: 0 K/UL (ref 0–0.01)
NRBC BLD-RTO: 0 PER 100 WBC
P-R INTERVAL, ECG05: 132 MS
PH UR STRIP: 7.5 [PH] (ref 5–8)
PLATELET # BLD AUTO: 140 K/UL (ref 150–400)
PMV BLD AUTO: 12.2 FL (ref 8.9–12.9)
POTASSIUM SERPL-SCNC: 4.9 MMOL/L (ref 3.5–5.1)
PROT SERPL-MCNC: 7.3 G/DL (ref 6.4–8.2)
PROT UR STRIP-MCNC: NEGATIVE MG/DL
Q-T INTERVAL, ECG07: 402 MS
QRS DURATION, ECG06: 78 MS
QTC CALCULATION (BEZET), ECG08: 440 MS
RBC # BLD AUTO: 4.17 M/UL (ref 3.8–5.2)
RBC #/AREA URNS HPF: ABNORMAL /HPF (ref 0–5)
RBC MORPH BLD: ABNORMAL
RBC MORPH BLD: ABNORMAL
SODIUM SERPL-SCNC: 139 MMOL/L (ref 136–145)
SOURCE, COVRS: NORMAL
SP GR UR REFRACTOMETRY: 1.01 (ref 1–1.03)
SPECIMEN SOURCE, FCOV2M: NORMAL
SPECIMEN TYPE, XMCV1T: NORMAL
TROPONIN I SERPL-MCNC: <0.05 NG/ML
UA: UC IF INDICATED,UAUC: ABNORMAL
UROBILINOGEN UR QL STRIP.AUTO: 1 EU/DL (ref 0.2–1)
VENTRICULAR RATE, ECG03: 72 BPM
WBC # BLD AUTO: 12.2 K/UL (ref 3.6–11)
WBC MORPH BLD: ABNORMAL
WBC URNS QL MICRO: ABNORMAL /HPF (ref 0–4)

## 2020-12-02 PROCEDURE — 85025 COMPLETE CBC W/AUTO DIFF WBC: CPT

## 2020-12-02 PROCEDURE — 99285 EMERGENCY DEPT VISIT HI MDM: CPT

## 2020-12-02 PROCEDURE — 2709999900 HC NON-CHARGEABLE SUPPLY

## 2020-12-02 PROCEDURE — 71275 CT ANGIOGRAPHY CHEST: CPT

## 2020-12-02 PROCEDURE — 94660 CPAP INITIATION&MGMT: CPT

## 2020-12-02 PROCEDURE — 36600 WITHDRAWAL OF ARTERIAL BLOOD: CPT

## 2020-12-02 PROCEDURE — 71045 X-RAY EXAM CHEST 1 VIEW: CPT

## 2020-12-02 PROCEDURE — 93005 ELECTROCARDIOGRAM TRACING: CPT

## 2020-12-02 PROCEDURE — 77010033678 HC OXYGEN DAILY

## 2020-12-02 PROCEDURE — 80053 COMPREHEN METABOLIC PANEL: CPT

## 2020-12-02 PROCEDURE — 5A09357 ASSISTANCE WITH RESPIRATORY VENTILATION, LESS THAN 24 CONSECUTIVE HOURS, CONTINUOUS POSITIVE AIRWAY PRESSURE: ICD-10-PCS | Performed by: EMERGENCY MEDICINE

## 2020-12-02 PROCEDURE — 74011250636 HC RX REV CODE- 250/636: Performed by: INTERNAL MEDICINE

## 2020-12-02 PROCEDURE — 83880 ASSAY OF NATRIURETIC PEPTIDE: CPT

## 2020-12-02 PROCEDURE — 81001 URINALYSIS AUTO W/SCOPE: CPT

## 2020-12-02 PROCEDURE — 84484 ASSAY OF TROPONIN QUANT: CPT

## 2020-12-02 PROCEDURE — 87635 SARS-COV-2 COVID-19 AMP PRB: CPT

## 2020-12-02 PROCEDURE — 77030013038 HC MSK CPAP FISP -A

## 2020-12-02 PROCEDURE — 3331090001 HH PPS REVENUE CREDIT

## 2020-12-02 PROCEDURE — 3331090002 HH PPS REVENUE DEBIT

## 2020-12-02 PROCEDURE — 74011000636 HC RX REV CODE- 636: Performed by: EMERGENCY MEDICINE

## 2020-12-02 PROCEDURE — 65660000000 HC RM CCU STEPDOWN

## 2020-12-02 PROCEDURE — 36415 COLL VENOUS BLD VENIPUNCTURE: CPT

## 2020-12-02 RX ORDER — SODIUM CHLORIDE 0.9 % (FLUSH) 0.9 %
5-40 SYRINGE (ML) INJECTION AS NEEDED
Status: DISCONTINUED | OUTPATIENT
Start: 2020-12-02 | End: 2020-12-03 | Stop reason: SDUPTHER

## 2020-12-02 RX ORDER — METOPROLOL TARTRATE 25 MG/1
25 TABLET, FILM COATED ORAL 2 TIMES DAILY
Status: DISCONTINUED | OUTPATIENT
Start: 2020-12-02 | End: 2020-12-08 | Stop reason: HOSPADM

## 2020-12-02 RX ORDER — SODIUM CHLORIDE 0.9 % (FLUSH) 0.9 %
5-40 SYRINGE (ML) INJECTION EVERY 8 HOURS
Status: DISCONTINUED | OUTPATIENT
Start: 2020-12-02 | End: 2020-12-03 | Stop reason: SDUPTHER

## 2020-12-02 RX ORDER — SODIUM CHLORIDE 0.9 % (FLUSH) 0.9 %
5-40 SYRINGE (ML) INJECTION EVERY 8 HOURS
Status: DISCONTINUED | OUTPATIENT
Start: 2020-12-02 | End: 2020-12-08 | Stop reason: HOSPADM

## 2020-12-02 RX ORDER — GUAIFENESIN 100 MG/5ML
81 LIQUID (ML) ORAL DAILY
Status: DISCONTINUED | OUTPATIENT
Start: 2020-12-03 | End: 2020-12-08 | Stop reason: HOSPADM

## 2020-12-02 RX ORDER — ATORVASTATIN CALCIUM 10 MG/1
10 TABLET, FILM COATED ORAL
Status: DISCONTINUED | OUTPATIENT
Start: 2020-12-02 | End: 2020-12-08 | Stop reason: HOSPADM

## 2020-12-02 RX ORDER — SODIUM CHLORIDE 0.9 % (FLUSH) 0.9 %
5-40 SYRINGE (ML) INJECTION AS NEEDED
Status: DISCONTINUED | OUTPATIENT
Start: 2020-12-02 | End: 2020-12-08 | Stop reason: HOSPADM

## 2020-12-02 RX ORDER — IPRATROPIUM BROMIDE AND ALBUTEROL SULFATE 2.5; .5 MG/3ML; MG/3ML
3 SOLUTION RESPIRATORY (INHALATION)
Status: DISCONTINUED | OUTPATIENT
Start: 2020-12-02 | End: 2020-12-08 | Stop reason: HOSPADM

## 2020-12-02 RX ORDER — AMIODARONE HYDROCHLORIDE 200 MG/1
200 TABLET ORAL DAILY
Status: DISCONTINUED | OUTPATIENT
Start: 2020-12-03 | End: 2020-12-08 | Stop reason: HOSPADM

## 2020-12-02 RX ORDER — FUROSEMIDE 40 MG/1
40 TABLET ORAL DAILY
Status: DISCONTINUED | OUTPATIENT
Start: 2020-12-03 | End: 2020-12-08 | Stop reason: HOSPADM

## 2020-12-02 RX ADMIN — IOPAMIDOL 100 ML: 755 INJECTION, SOLUTION INTRAVENOUS at 20:23

## 2020-12-02 RX ADMIN — METHYLPREDNISOLONE SODIUM SUCCINATE 40 MG: 40 INJECTION, POWDER, FOR SOLUTION INTRAMUSCULAR; INTRAVENOUS at 22:36

## 2020-12-02 NOTE — ED TRIAGE NOTES
Pt presents via EMS, EMS states family called d/t pt change in LOC. Pt currently A&O x4 w/ no complaints. Pt uses 3L NC at baseline, when pt was moved from stretcher to ED bed pt O2 dropped to 55. Pt put on 15L via non-rebreathe and set up. Doctor at bedside.  Pt was able to go back to 3L NC  `

## 2020-12-02 NOTE — ED NOTES
Patients o2 in the mid 70's on 5L NC. Dr. Homero Parr aware and April from [facility] is coming to put patient on bipap.

## 2020-12-02 NOTE — ED NOTES
EMS stated they reached out to there star program to contact the family about getting extra support and help in home. When pt arrived she stated she had been setting in a dirty breif for over 3 hour because she did not want to bother her family.  Pt peritoneal area upon cleaning pt pubic hair was crusted over with possibly urine and yeast. Provider made aware of pt condition

## 2020-12-02 NOTE — ED PROVIDER NOTES
EMERGENCY DEPARTMENT HISTORY AND PHYSICAL EXAM      Date: 12/2/2020  Patient Name: Rashad Fernandes  Patient Age and Sex: 63 y.o. female     History of Presenting Illness     Chief Complaint   Patient presents with    Altered mental status    Shortness of Breath       History Provided By: Patient, EMS    HPI: Rashad Fernandes is a 56-year-old female with a history of atrial fibrillation, COPD, congestive heart failure, presenting for confusion. According to EMS patient's family found patient to be very confused today and slightly altered from her baseline. When EMS got there, they noticed her laying flat in the bed, on her 3 L nasal cannula. They had a hard time getting a saturation on her. However she was alert, answering all questions appropriately and was oriented x4 for them. They did notice that she was covered in stool and there was concern that family was not taking good care of her. Patient here denies any chest pain, abdominal pain, nausea, vomiting, fevers. She does state that she has been having shortness of breath over the past 3 months but has had some worsening shortness of breath over the past 3 days. Is on BiPAP at night    There are no other complaints, changes, or physical findings at this time. PCP: Brandon Nevarez NP    No current facility-administered medications on file prior to encounter. Current Outpatient Medications on File Prior to Encounter   Medication Sig Dispense Refill    amiodarone (CORDARONE) 200 mg tablet Take 1 Tab by mouth daily for 60 days. 30 Tab 1    polyethylene glycol (MIRALAX) 17 gram packet Take 1 Packet by mouth daily for 30 days. 30 Packet 0    predniSONE (DELTASONE) 10 mg tablet 4 tabs daily for 2 days; 3 tabs daily for 2 days; 2 tabs daily for 2 days ;1 tab   daily for 2 days 20 Tab 0    rivaroxaban (XARELTO) 20 mg tab tablet Take 1 Tab by mouth daily (with dinner) for 60 days.  30 Tab 1    menthol-zinc oxide (Moisture Barrier Ointment) 0.44-20.6 % oint Apply 1 Each to affected area three (3) times daily. thin layer moisture associated lesion      aspirin 81 mg chewable tablet Take 1 Tab by mouth daily. 30 Tab 0    OXYGEN-AIR DELIVERY SYSTEMS 3 L/min by Nasal route continuous.  acetaminophen (TYLENOL) 325 mg tablet Take 650 mg by mouth every six (6) hours as needed for Pain.  furosemide (LASIX) 40 mg tablet Take 1 Tab by mouth daily. 30 Tab 0    atorvastatin (LIPITOR) 10 mg tablet Take 10 mg by mouth nightly.  metoprolol (LOPRESSOR) 25 mg tablet Take 25 mg by mouth two (2) times a day.          Past History     Past Medical History:  Past Medical History:   Diagnosis Date    Acute on chronic respiratory failure with hypoxia and hypercapnia ([facility] 11/20/2020    Atrial fibrillation ([facility] 11/4/2020    CHF (congestive heart failure) ([facility]     Diabetes ([facility]     Hypertension     Ill-defined condition     high cholesterol    Ill-defined condition     tachycardia    JOSLYN (obstructive sleep apnea) 11/20/2020    Other and unspecified symptoms and signs involving general sensations and perceptions     ruptured disc    S/P cardiac cath 11/4/2020    11/3/2020 nonobstructive disease       Past Surgical History:  Past Surgical History:   Procedure Laterality Date    CO COMPRE ELECTROPHYSIOL XM W/LEFT ATRIAL PACNG/REC N/A 11/23/2020    Lt Atrial Pace & Record During Ep Study performed by Eva Salazar MD at [facility] CARDIAC CATH LAB    CO EPHYS EVAL W/ABLATION SUPRAVENT ARRHYTHMIA N/A 11/23/2020    ABLATION A-FLUTTER performed by Eva Salazar MD at [facility] CARDIAC CATH LAB   [facility] ADD ON N/A 11/23/2020    Ablation Svt/Vt Add On performed by Eva Salazar MD at [facility] CARDIAC CATH LAB    CO INTRACARDIAC ELECTROPHYSIOLOGIC 3D MAPPING N/A 11/23/2020    Ep 3d Mapping performed by Eva Salazar MD at [facility] CARDIAC CATH LAB    STIM/PACING HEART POST IV DRUG INFU N/A 11/23/2020    Drug Stimulation performed by Nicholas Perales MD at [facility] CARDIAC CATH LAB       Family History:  No family history on file. Social History:  Social History     Tobacco Use    Smoking status: Former Smoker    Smokeless tobacco: Never Used   Substance Use Topics    Alcohol use: No    Drug use: No       Allergies: Allergies   Allergen Reactions    Cefazolin Rash    Other Plant, Animal, Environmental Rash     Ivory soap caused rash on hands. Review of Systems   Review of Systems   Constitutional: Negative for chills, fatigue and fever. Respiratory: Positive for shortness of breath. Negative for cough. Cardiovascular: Negative for chest pain and leg swelling. Gastrointestinal: Positive for diarrhea. Negative for abdominal pain, constipation, nausea and vomiting. Genitourinary: Negative for dysuria, frequency and hematuria. Neurological: Negative for weakness and numbness. Psychiatric/Behavioral: Positive for confusion. All other systems reviewed and are negative. Physical Exam   Physical Exam  Vitals signs and nursing note reviewed. Constitutional:       Appearance: She is well-developed. She is obese. Comments: Morbidly obese female   HENT:      Head: Normocephalic and atraumatic. Nose: Nose normal.      Mouth/Throat:      Mouth: Mucous membranes are moist.   Eyes:      Extraocular Movements: Extraocular movements intact. Conjunctiva/sclera: Conjunctivae normal.   Neck:      Musculoskeletal: Normal range of motion and neck supple. Cardiovascular:      Rate and Rhythm: Normal rate and regular rhythm. Pulmonary:      Effort: Pulmonary effort is normal. No respiratory distress. Breath sounds: Normal breath sounds. Comments: When I walked into the room, patient was at 30 degrees so I set her up to 90 degrees. Patient was placed on nonrebreather because saturations initially were reading 55% though there was not a great waveform.   Once there was a good waveform it slowly crept up to 88% and then jumped up to 100% on nonrebreather. After her being on nonrebreather for a while, she was transferred to 6 L nasal cannula, then 4 L and then 3 L nasal cannula. All the while, patient was alert and answering my questions appropriately. She did not look like she was in any respiratory distress. On lung exam she has decreased breath sounds bilaterally. Abdominal:      General: There is no distension. Palpations: Abdomen is soft. Tenderness: There is no abdominal tenderness. Musculoskeletal: Normal range of motion. Skin:     General: Skin is warm and dry. Neurological:      General: No focal deficit present. Mental Status: She is alert and oriented to person, place, and time. Mental status is at baseline. Comments: Patient is aware that she is at [facility], she thought it was November however it is the very beginning of December so I will not fault her for this, she also knows the current president and day.    Psychiatric:         Mood and Affect: Mood normal.          Diagnostic Study Results     Labs -     Recent Results (from the past 12 hour(s))   EKG, 12 LEAD, INITIAL    Collection Time: 12/02/20  4:16 PM   Result Value Ref Range    Ventricular Rate 72 BPM    Atrial Rate 72 BPM    P-R Interval 132 ms    QRS Duration 78 ms    Q-T Interval 402 ms    QTC Calculation (Bezet) 440 ms    Calculated P Axis 48 degrees    Calculated R Axis 83 degrees    Calculated T Axis 52 degrees    Diagnosis       Normal sinus rhythm  Inferior Q waves of questionable significance  When compared with ECG of 18-NOV-2020 12:16,  No significant change was found  Confirmed by Saray Reina ([id number]) on 12/2/2020 6:06:45 PM     CBC WITH AUTOMATED DIFF    Collection Time: 12/02/20  4:34 PM   Result Value Ref Range    WBC 12.2 (H) 3.6 - 11.0 K/uL    RBC 4.17 3.80 - 5.20 M/uL    HGB 13.0 11.5 - 16.0 g/dL    HCT 46.2 35.0 - 47.0 %    .8 (H) 80.0 - 99.0 FL    MCH 31.2 26.0 - 34.0 PG    MCHC 28.1 (L) 30.0 - 36.5 g/dL    RDW 13.6 11.5 - 14.5 %    PLATELET 753 (L) 319 - 400 K/uL    MPV 12.2 8.9 - 12.9 FL    NRBC 0.0 0  WBC    ABSOLUTE NRBC 0.00 0.00 - 0.01 K/uL    NEUTROPHILS 90 (H) 32 - 75 %    LYMPHOCYTES 5 (L) 12 - 49 %    MONOCYTES 3 (L) 5 - 13 %    EOSINOPHILS 1 0 - 7 %    BASOPHILS 0 0 - 1 %    IMMATURE GRANULOCYTES 1 (H) 0.0 - 0.5 %    ABS. NEUTROPHILS 11.0 (H) 1.8 - 8.0 K/UL    ABS. LYMPHOCYTES 0.6 (L) 0.8 - 3.5 K/UL    ABS. MONOCYTES 0.4 0.0 - 1.0 K/UL    ABS. EOSINOPHILS 0.1 0.0 - 0.4 K/UL    ABS. BASOPHILS 0.0 0.0 - 0.1 K/UL    ABS. IMM. GRANS. 0.1 (H) 0.00 - 0.04 K/UL    DF SMEAR SCANNED      RBC COMMENTS STOMATOCYTES  PRESENT        RBC COMMENTS BASOPHILIC STIPPLING  PRESENT        WBC COMMENTS VACUOLATED POLYS     METABOLIC PANEL, COMPREHENSIVE    Collection Time: 12/02/20  4:34 PM   Result Value Ref Range    Sodium 139 136 - 145 mmol/L    Potassium 4.9 3.5 - 5.1 mmol/L    Chloride 89 (L) 97 - 108 mmol/L    CO2 >45 (HH) 21 - 32 mmol/L    Anion gap Cannot be calculated 5 - 15 mmol/L    Glucose 163 (H) 65 - 100 mg/dL    BUN 24 (H) 6 - 20 MG/DL    Creatinine 0.66 0.55 - 1.02 MG/DL    BUN/Creatinine ratio 36 (H) 12 - 20      GFR est AA >60 >60 ml/min/1.73m2    GFR est non-AA >60 >60 ml/min/1.73m2    Calcium 9.2 8.5 - 10.1 MG/DL    Bilirubin, total 0.8 0.2 - 1.0 MG/DL    ALT (SGPT) 61 12 - 78 U/L    AST (SGOT) 59 (H) 15 - 37 U/L    Alk.  phosphatase 139 (H) 45 - 117 U/L    Protein, total 7.3 6.4 - 8.2 g/dL    Albumin 2.8 (L) 3.5 - 5.0 g/dL    Globulin 4.5 (H) 2.0 - 4.0 g/dL    A-G Ratio 0.6 (L) 1.1 - 2.2     TROPONIN I    Collection Time: 12/02/20  4:34 PM   Result Value Ref Range    Troponin-I, Qt. <0.05 <0.05 ng/mL   NT-PRO BNP    Collection Time: 12/02/20  4:34 PM   Result Value Ref Range    NT pro- (H) <125 PG/ML   URINALYSIS W/ REFLEX CULTURE    Collection Time: 12/02/20  5:24 PM    Specimen: Urine   Result Value Ref Range    Color YELLOW/STRAW      Appearance CLOUDY (A) CLEAR      Specific gravity 1.013 1.003 - 1.030      pH (UA) 7.5 5.0 - 8.0      Protein Negative NEG mg/dL    Glucose Negative NEG mg/dL    Ketone Negative NEG mg/dL    Bilirubin Negative NEG      Blood Negative NEG      Urobilinogen 1.0 0.2 - 1.0 EU/dL    Nitrites Negative NEG      Leukocyte Esterase Negative NEG      WBC 0-4 0 - 4 /hpf    RBC 0-5 0 - 5 /hpf    Epithelial cells FEW FEW /lpf    Bacteria Negative NEG /hpf    UA:UC IF INDICATED CULTURE NOT INDICATED BY UA RESULT CNI      Hyaline cast 2-5 0 - 5 /lpf       Radiologic Studies -   XR CHEST PORT   Final Result   IMPRESSION:   Perihilar interstitial prominence with stable mild parenchymal opacification   suggesting edema or small airways disease. Bibasilar atelectasis. .  . CTA CHEST W OR W WO CONT    (Results Pending)     CT Results  (Last 48 hours)    None        CXR Results  (Last 48 hours)               12/02/20 1738  XR CHEST PORT Final result    Impression:  IMPRESSION:   Perihilar interstitial prominence with stable mild parenchymal opacification   suggesting edema or small airways disease. Bibasilar atelectasis. .  . Narrative:  INDICATION:  SOB        EXAM: Chest single view. COMPARISON: 11/28/2020 chest x-ray, 8/14/2020 chest CT. FINDINGS: A single frontal view of the chest at 1725 hours shows significant   persistent perihilar interstitial opacities with possible small bilateral   pleural effusions, although effusions are difficult to confirm. . Bibasilar   atelectasis. .  The heart, mediastinum and pulmonary vasculature are stable with   mild enlargement . The bony thorax is unremarkable for age. .                   Medical Decision Making   I am the first provider for this patient. I reviewed the vital signs, available nursing notes, past medical history, past surgical history, family history and social history. Vital Signs-Reviewed the patient's vital signs.   Patient Vitals for the past 12 hrs: Temp Pulse Resp BP SpO2   12/02/20 1822     96 %   12/02/20 1812  74 24  100 %   12/02/20 1803  76 27  (!) 75 %   12/02/20 1745  73 27 (!) 119/48 90 %   12/02/20 1736     95 %   12/02/20 1730  68 27 113/62 94 %   12/02/20 1635  79 29  95 %   12/02/20 1612 98.1 °F (36.7 °C) 72 23 104/66 (!) 88 %       Records Reviewed: Nursing Notes and Old Medical Records    Provider Notes (Medical Decision Making):   Patient presenting confusion with some complaints of shortness of breath and cough. I have a high suspicion that patient had a hypoxic/hypercarbic event at home given that she was laying flat in bed, possibly napping without her BiPAP machine. Here she is alert and oriented and answering questions appropriately. Differential includes pneumonia, acute bronchitis, her COPD exacerbation, CHF exacerbation, less likely ACS or PE. Less likely UTI given no symptoms. Will get ABG, chest x-ray, EKG, lab work. ED Course:   Initial assessment performed. The patients presenting problems have been discussed, and they are in agreement with the care plan formulated and outlined with them. I have encouraged them to ask questions as they arise throughout their visit. ED Course as of Dec 02 1825   Wed Dec 02, 2020   1628 Had long discussion with RT. Patient is supposed to be on BiPAP at night but is not always compliant with it. We think that given that patient was found laying down she was likely sleeping and was not on BiPAP so likely had an hypoxic event due to this. Patient saturations right now on her 3 L nasal cannula is 94 to 95%. ABG shows a pH of 7.36 however her CO2 is elevated greater than 90 with a normal O2. Patient does have chronic hypercapnia. Given that patient is feeling better, is alert, answering all my questions appropriately, and has normal saturations right now on her home O2, will get a repeat ABG in an hour and a half to make sure no changes.     [JS]   8904 When patient was laid down to change her clothes as well as get a urine catheter, her saturations dropped from 90s to 72% with a good waveform. She continued to be alert and answering questions. It is likely her positioning with laying down that is causing her to desaturate, become hypoxic and hypercapnic and altered. I told her this and we will have to tell the family this. Waiting on urinalysis and repeat ABG. Chest x-ray pending. [JS]   9813 Patient was doing well with saturations in the 90s but then she got moved to another room and now sitting up, saturations are 75% on her 3 L. We will plan to put her on BiPAP, and bring her into the hospital.  Patient does state that she feels more short of breath now. [JS]      ED Course User Index  [JS] Sd Arciniega MD     Critical Care Time:   CRITICAL CARE NOTE :    4:29 PM    IMPENDING DETERIORATION -Airway, Respiratory and Cardiovascular  ASSOCIATED RISK FACTORS - Hypotension, Shock and Hypoxia  MANAGEMENT- Bedside Assessment and Supervision of Care  INTERPRETATION -  Xrays, Blood Gases, ECG, Blood Pressure and Cardiac Output Measures   INTERVENTIONS - hemodynamic mngmt and vent mngmt  CASE REVIEW - Hospitalist and Nursing  TREATMENT RESPONSE -Stable  PERFORMED BY - Self    NOTES   :    I have spent 50 minutes of critical care time involved in lab review, consultations with specialist, family decision- making, bedside attention and documentation. During this entire length of time I was immediately available to the patient . Disposition:    Admission Note:  Patient is being admitted to the hospital by Dr. Jenny Patton, Service: Hospitalist.  The results of their tests and reasons for their admission have been discussed with them and available family. They convey agreement and understanding for the need to be admitted and for their admission diagnosis. Diagnosis     Clinical Impression:   1. Acute respiratory failure with hypoxia ([address]    2.  Pickwickian syndrome ([address] 3. Chronic hypercapnic respiratory failure (HCC)        Attestations:  Jackie Issa M.D. Please note that this dictation was completed with [employer], the computer voice recognition software. Quite often unanticipated grammatical, syntax, homophones, and other interpretive errors are inadvertently transcribed by the computer software. Please disregard these errors. Please excuse any errors that have escaped final proofreading. Thank you.

## 2020-12-03 LAB
ANION GAP SERPL CALC-SCNC: ABNORMAL MMOL/L (ref 5–15)
ARTERIAL PATENCY WRIST A: YES
BASE EXCESS BLD CALC-SCNC: 29 MMOL/L
BDY SITE: ABNORMAL
BUN SERPL-MCNC: 24 MG/DL (ref 6–20)
BUN/CREAT SERPL: 50 (ref 12–20)
CA-I BLD-SCNC: 1.22 MMOL/L (ref 1.12–1.32)
CA-I BLD-SCNC: 1.23 MMOL/L (ref 1.12–1.32)
CA-I BLD-SCNC: 1.33 MMOL/L (ref 1.12–1.32)
CALCIUM SERPL-MCNC: 8.8 MG/DL (ref 8.5–10.1)
CHLORIDE SERPL-SCNC: 93 MMOL/L (ref 97–108)
CO2 SERPL-SCNC: >45 MMOL/L (ref 21–32)
CREAT SERPL-MCNC: 0.48 MG/DL (ref 0.55–1.02)
ERYTHROCYTE [DISTWIDTH] IN BLOOD BY AUTOMATED COUNT: 13.6 % (ref 11.5–14.5)
GAS FLOW.O2 O2 DELIVERY SYS: ABNORMAL L/MIN
GAS FLOW.O2 SETTING OXYMISER: 3 L/M
GAS FLOW.O2 SETTING OXYMISER: 3 L/M
GAS FLOW.O2 SETTING OXYMISER: 4 L/M
GLUCOSE BLD STRIP.AUTO-MCNC: 121 MG/DL (ref 65–100)
GLUCOSE BLD STRIP.AUTO-MCNC: 125 MG/DL (ref 65–100)
GLUCOSE BLD STRIP.AUTO-MCNC: 234 MG/DL (ref 65–100)
GLUCOSE SERPL-MCNC: 140 MG/DL (ref 65–100)
HCO3 BLD-SCNC: 53.6 MMOL/L (ref 22–26)
HCT VFR BLD AUTO: 42.4 % (ref 35–47)
HGB BLD-MCNC: 12.2 G/DL (ref 11.5–16)
MCH RBC QN AUTO: 31.2 PG (ref 26–34)
MCHC RBC AUTO-ENTMCNC: 28.8 G/DL (ref 30–36.5)
MCV RBC AUTO: 108.4 FL (ref 80–99)
NRBC # BLD: 0 K/UL (ref 0–0.01)
NRBC BLD-RTO: 0 PER 100 WBC
PCO2 BLD: 87.7 MMHG (ref 35–45)
PCO2 BLD: >90 MMHG (ref 35–45)
PCO2 BLD: >90 MMHG (ref 35–45)
PH BLD: 7.35 [PH] (ref 7.35–7.45)
PH BLD: 7.37 [PH] (ref 7.35–7.45)
PH BLD: 7.39 [PH] (ref 7.35–7.45)
PLATELET # BLD AUTO: 113 K/UL (ref 150–400)
PMV BLD AUTO: 12.1 FL (ref 8.9–12.9)
PO2 BLD: 43 MMHG (ref 80–100)
PO2 BLD: 43 MMHG (ref 80–100)
PO2 BLD: 68 MMHG (ref 80–100)
POTASSIUM SERPL-SCNC: 4.3 MMOL/L (ref 3.5–5.1)
RBC # BLD AUTO: 3.91 M/UL (ref 3.8–5.2)
SAO2 % BLD: 91 % (ref 92–97)
SERVICE CMNT-IMP: ABNORMAL
SODIUM SERPL-SCNC: 140 MMOL/L (ref 136–145)
SPECIMEN TYPE: ABNORMAL
TOTAL RESP. RATE, ITRR: 19
TOTAL RESP. RATE, ITRR: 22
TOTAL RESP. RATE, ITRR: 32
WBC # BLD AUTO: 9.1 K/UL (ref 3.6–11)

## 2020-12-03 PROCEDURE — 77010033678 HC OXYGEN DAILY

## 2020-12-03 PROCEDURE — 82962 GLUCOSE BLOOD TEST: CPT

## 2020-12-03 PROCEDURE — 36600 WITHDRAWAL OF ARTERIAL BLOOD: CPT

## 2020-12-03 PROCEDURE — 94640 AIRWAY INHALATION TREATMENT: CPT

## 2020-12-03 PROCEDURE — 94660 CPAP INITIATION&MGMT: CPT

## 2020-12-03 PROCEDURE — 80048 BASIC METABOLIC PNL TOTAL CA: CPT

## 2020-12-03 PROCEDURE — 3331090002 HH PPS REVENUE DEBIT

## 2020-12-03 PROCEDURE — 74011250637 HC RX REV CODE- 250/637: Performed by: NURSE PRACTITIONER

## 2020-12-03 PROCEDURE — 74011000250 HC RX REV CODE- 250: Performed by: INTERNAL MEDICINE

## 2020-12-03 PROCEDURE — 74011250637 HC RX REV CODE- 250/637: Performed by: HOSPITALIST

## 2020-12-03 PROCEDURE — 85027 COMPLETE CBC AUTOMATED: CPT

## 2020-12-03 PROCEDURE — 74011250637 HC RX REV CODE- 250/637: Performed by: INTERNAL MEDICINE

## 2020-12-03 PROCEDURE — 74011250636 HC RX REV CODE- 250/636: Performed by: INTERNAL MEDICINE

## 2020-12-03 PROCEDURE — 3331090001 HH PPS REVENUE CREDIT

## 2020-12-03 PROCEDURE — 82803 BLOOD GASES ANY COMBINATION: CPT

## 2020-12-03 PROCEDURE — 74011636637 HC RX REV CODE- 636/637: Performed by: INTERNAL MEDICINE

## 2020-12-03 PROCEDURE — 65660000000 HC RM CCU STEPDOWN

## 2020-12-03 PROCEDURE — 36415 COLL VENOUS BLD VENIPUNCTURE: CPT

## 2020-12-03 PROCEDURE — 74011000250 HC RX REV CODE- 250: Performed by: HOSPITALIST

## 2020-12-03 RX ORDER — IPRATROPIUM BROMIDE AND ALBUTEROL SULFATE 2.5; .5 MG/3ML; MG/3ML
3 SOLUTION RESPIRATORY (INHALATION)
Status: DISCONTINUED | OUTPATIENT
Start: 2020-12-03 | End: 2020-12-04

## 2020-12-03 RX ORDER — INSULIN LISPRO 100 [IU]/ML
INJECTION, SOLUTION INTRAVENOUS; SUBCUTANEOUS
Status: DISCONTINUED | OUTPATIENT
Start: 2020-12-03 | End: 2020-12-08 | Stop reason: HOSPADM

## 2020-12-03 RX ORDER — ACETAMINOPHEN 325 MG/1
650 TABLET ORAL
Status: DISCONTINUED | OUTPATIENT
Start: 2020-12-03 | End: 2020-12-08 | Stop reason: HOSPADM

## 2020-12-03 RX ORDER — MAGNESIUM SULFATE 100 %
4 CRYSTALS MISCELLANEOUS AS NEEDED
Status: DISCONTINUED | OUTPATIENT
Start: 2020-12-03 | End: 2020-12-08 | Stop reason: HOSPADM

## 2020-12-03 RX ORDER — BUDESONIDE 0.5 MG/2ML
500 INHALANT ORAL
Status: DISCONTINUED | OUTPATIENT
Start: 2020-12-03 | End: 2020-12-08 | Stop reason: HOSPADM

## 2020-12-03 RX ORDER — DEXTROSE 50 % IN WATER (D50W) INTRAVENOUS SYRINGE
25-50 AS NEEDED
Status: DISCONTINUED | OUTPATIENT
Start: 2020-12-03 | End: 2020-12-08 | Stop reason: HOSPADM

## 2020-12-03 RX ORDER — AZITHROMYCIN 250 MG/1
500 TABLET, FILM COATED ORAL DAILY
Status: COMPLETED | OUTPATIENT
Start: 2020-12-03 | End: 2020-12-07

## 2020-12-03 RX ADMIN — Medication 1 AMPULE: at 10:06

## 2020-12-03 RX ADMIN — Medication 10 ML: at 21:54

## 2020-12-03 RX ADMIN — FUROSEMIDE 40 MG: 40 TABLET ORAL at 10:05

## 2020-12-03 RX ADMIN — IPRATROPIUM BROMIDE AND ALBUTEROL SULFATE 3 ML: .5; 3 SOLUTION RESPIRATORY (INHALATION) at 11:57

## 2020-12-03 RX ADMIN — ATORVASTATIN CALCIUM 10 MG: 10 TABLET, FILM COATED ORAL at 21:54

## 2020-12-03 RX ADMIN — IPRATROPIUM BROMIDE AND ALBUTEROL SULFATE 3 ML: .5; 3 SOLUTION RESPIRATORY (INHALATION) at 19:15

## 2020-12-03 RX ADMIN — METHYLPREDNISOLONE SODIUM SUCCINATE 40 MG: 40 INJECTION, POWDER, FOR SOLUTION INTRAMUSCULAR; INTRAVENOUS at 06:04

## 2020-12-03 RX ADMIN — BUDESONIDE 500 MCG: 0.5 INHALANT RESPIRATORY (INHALATION) at 19:15

## 2020-12-03 RX ADMIN — METOPROLOL TARTRATE 25 MG: 25 TABLET, FILM COATED ORAL at 17:10

## 2020-12-03 RX ADMIN — AMIODARONE HYDROCHLORIDE 200 MG: 200 TABLET ORAL at 10:06

## 2020-12-03 RX ADMIN — ASPIRIN 81 MG CHEWABLE TABLET 81 MG: 81 TABLET CHEWABLE at 10:05

## 2020-12-03 RX ADMIN — INSULIN LISPRO 3 UNITS: 100 INJECTION, SOLUTION INTRAVENOUS; SUBCUTANEOUS at 12:11

## 2020-12-03 RX ADMIN — AZITHROMYCIN 500 MG: 250 TABLET, FILM COATED ORAL at 15:04

## 2020-12-03 RX ADMIN — Medication 1 AMPULE: at 21:54

## 2020-12-03 RX ADMIN — ACETAMINOPHEN 650 MG: 325 TABLET ORAL at 05:57

## 2020-12-03 RX ADMIN — Medication 10 ML: at 15:05

## 2020-12-03 RX ADMIN — Medication 10 ML: at 05:58

## 2020-12-03 RX ADMIN — METOPROLOL TARTRATE 25 MG: 25 TABLET, FILM COATED ORAL at 10:05

## 2020-12-03 RX ADMIN — IPRATROPIUM BROMIDE AND ALBUTEROL SULFATE 3 ML: .5; 3 SOLUTION RESPIRATORY (INHALATION) at 08:57

## 2020-12-03 RX ADMIN — RIVAROXABAN 20 MG: 20 TABLET, FILM COATED ORAL at 17:10

## 2020-12-03 RX ADMIN — IPRATROPIUM BROMIDE AND ALBUTEROL SULFATE 3 ML: .5; 3 SOLUTION RESPIRATORY (INHALATION) at 15:31

## 2020-12-03 RX ADMIN — ACETAMINOPHEN 650 MG: 325 TABLET ORAL at 12:11

## 2020-12-03 NOTE — PROGRESS NOTES
0700: Bedside shift change report received from [address] (offgoing nurse). Report included the following information SBAR, Kardex, ED Summary, Procedure Summary, Intake/Output, MAR and Recent Results. 0800: Shift assessment completed, see flowsheets     1200: Reassessment completed, see flowsheets    1600: Reassessment completed, see flowsheets     1900: Bedside shift change report given to Ginny Mckeon RN (oncoming nurse) by Pantera Torres RN (offgoing nurse). Report included the following information SBAR, Kardex, ED Summary, Procedure Summary, Intake/Output, MAR and Recent Results.

## 2020-12-03 NOTE — CONSULTS
Pulmonology Intensive Care Unit Initial Assessment    Subjective:        Subjective:     Critical Care Initial Evaluation Note: 12/3/2020 12:56 PM      Ms. Semaj Oliveira is 64 yo CF with morbid obesity, HTN, DM2, suspected JOSLYN and COPD. She is admitted with acute on chronic hypoxemic and hypercapnic respiratory failure. Pulmonary consult is requested for co management. She has about 60+ pack yrs of smoking history. She has been admitted twice in the last month with hypercapnic respiratory failure. She is not on any maintenance inhalers at home for COPD. She is on 3 lit of oxygen by NC. She has not had any formal diagnosis of COPD or PFTs. She has been following up on [facility] in the past. In first wk of November when she was admitted, she also had new A fib with RVR, AC was not prescribed due to affordability issues and non compliance. She has also been bed bound for the last couple of months due to her knee pains and also due to respiratory status. She was started on BiPAP support overnight due to hypercapnia and co2 narcosis. Her mental status has significantly improved, she is now alert and oriented x3, denies active chest pain, N, V, abd pain, recent URI symptoms, change in bowel or urinary habits.      Past Medical History:   Diagnosis Date    Acute on chronic respiratory failure with hypoxia and hypercapnia (HCC) 11/20/2020    Atrial fibrillation ([facility] [address] 11/4/2020    CHF (congestive heart failure) ([facility] [address]     Diabetes ([facility] [address]     Hypertension     Ill-defined condition     high cholesterol    Ill-defined condition     tachycardia    JOSLYN (obstructive sleep apnea) 11/20/2020    Other and unspecified symptoms and signs involving general sensations and perceptions     ruptured disc    S/P cardiac cath 11/4/2020    11/3/2020 nonobstructive disease      Past Surgical History:   Procedure Laterality Date    AK COMPRE ELECTROPHYSIOL XM W/LEFT ATRIAL PACNG/REC N/A 11/23/2020    Lt Atrial Pace & Record During Ep Study performed by Emery Pedro MD at [facility] C[facility]ATH [facility]    AR EPHYS EVAL W/ABLATION SUPRAVENT ARRHYTHMIA N/A 11/23/2020    ABLATION A-FLUTTER performed by Emery Pedro MD at [facility] CARDIAC CATH LAB    AR ICAR CATHETER ABLATION ARRHYTHMIA ADD ON N/A 11/23/2020    Ablation Svt/Vt Add On performed by Emery Pedro MD at [facility] C[facility] CATH [facility]    AR INTRACARDIAC ELECTROPHYSIOLOGIC 3D MAPPING N/A 11/23/2020    Ep 3d Mapping performed by Emery Pedro MD at [facility] CARDIAC CATH LAB    STIM/PACING HEART POST IV DRUG INFU N/A 11/23/2020    Drug Stimulation performed by Emery Pedro MD at [facility] CARDIAC CATH LAB      Prior to Admission medications    Medication Sig Start Date End Date Taking? Authorizing Provider   aspirin 81 mg chewable tablet Take 1 Tab by mouth daily. 11/5/20  Yes Zane Lazar MD   acetaminophen (TYLENOL) 325 mg tablet Take 650 mg by mouth every six (6) hours as needed for Pain. Yes Provider, Historical   amiodarone (CORDARONE) 200 mg tablet Take 1 Tab by mouth daily for 60 days. 11/25/20 1/24/21  Darvin Mederos MD   polyethylene glycol (MIRALAX) 17 gram packet Take 1 Packet by mouth daily for 30 days. 11/25/20 12/25/20  Darvin Mederos MD   predniSONE (DELTASONE) 10 mg tablet 4 tabs daily for 2 days; 3 tabs daily for 2 days; 2 tabs daily for 2 days ;1 tab   daily for 2 days 11/24/20   Darvin Mederos MD   rivaroxaban (XARELTO) 20 mg tab tablet Take 1 Tab by mouth daily (with dinner) for 60 days. 11/24/20 1/23/21  Darvin Mederos MD   menthol-zinc oxide (Moisture Barrier Ointment) 0.44-20.6 % oint Apply 1 Each to affected area three (3) times daily. thin layer moisture associated lesion    Provider, Historical   OXYGEN-AIR DELIVERY SYSTEMS 3 L/min by Nasal route continuous. 8/31/20   Glenn Gonzalez NP   furosemide (LASIX) 40 mg tablet Take 1 Tab by mouth daily. 8/7/20   Miracle Hernández MD   atorvastatin (LIPITOR) 10 mg tablet Take 10 mg by mouth nightly. Other, MD Russ   metoprolol (LOPRESSOR) 25 mg tablet Take 25 mg by mouth two (2) times a day. Rachael, MD Russ     Allergies   Allergen Reactions    Cefazolin Rash    Other Plant, Animal, Environmental Rash     Ivory soap caused rash on hands. Social History     Tobacco Use    Smoking status: Former Smoker    Smokeless tobacco: Never Used   Substance Use Topics    Alcohol use: No      No family history on file. Review of Systems   All other systems reviewed and are negative. Objective:     Vital signs reviewed. No intake/output data recorded. No intake/output data recorded. Physical Exam  Constitutional:       General: She is not in acute distress. HENT:      Nose: No congestion or rhinorrhea. Mouth/Throat:      Mouth: Mucous membranes are moist.   Eyes:      General:         Right eye: No discharge. Left eye: No discharge. Extraocular Movements: Extraocular movements intact. Conjunctiva/sclera: Conjunctivae normal.   Neck:      Musculoskeletal: Neck supple. No neck rigidity or muscular tenderness. Cardiovascular:      Rate and Rhythm: Normal rate and regular rhythm. Pulses: Normal pulses. Heart sounds: Normal heart sounds. No murmur. No friction rub. Pulmonary:      Effort: Pulmonary effort is normal. No respiratory distress. Breath sounds: No stridor. Wheezing present. Abdominal:      General: There is no distension. Palpations: Abdomen is soft. Tenderness: There is no abdominal tenderness. Musculoskeletal:      Comments: 1+edema   Skin:     General: Skin is warm. Coloration: Skin is not jaundiced or pale. Neurological:      Mental Status: She is alert.    Psychiatric:         Mood and Affect: Mood normal.         Behavior: Behavior normal.         Data Review:     Recent Results (from the past 24 hour(s))   EKG, 12 LEAD, INITIAL    Collection Time: 12/02/20  4:16 PM   Result Value Ref Range    Ventricular Rate 72 BPM Atrial Rate 72 BPM    P-R Interval 132 ms    QRS Duration 78 ms    Q-T Interval 402 ms    QTC Calculation (Bezet) 440 ms    Calculated P Axis 48 degrees    Calculated R Axis 83 degrees    Calculated T Axis 52 degrees    Diagnosis       Normal sinus rhythm  Inferior Q waves of questionable significance  When compared with ECG of 18-NOV-2020 12:16,  No significant change was found  Confirmed by Power Ibrahim ([id number]) on 12/2/2020 6:06:45 PM     POC EG7    Collection Time: 12/02/20  4:24 PM   Result Value Ref Range    Calcium, ionized (POC) 1.22 1.12 - 1.32 mmol/L    pH (POC) 7.37 7.35 - 7.45      pCO2 (POC) >90.0 (H) 35.0 - 45.0 MMHG    pO2 (POC) 43 (LL) 80 - 100 MMHG    Site RIGHT RADIAL      Device: NASAL CANNULA      Flow rate (POC) 3 L/M    Allens test (POC) YES      Specimen type (POC) ARTERIAL      Total resp. rate 32     CBC WITH AUTOMATED DIFF    Collection Time: 12/02/20  4:34 PM   Result Value Ref Range    WBC 12.2 (H) 3.6 - 11.0 K/uL    RBC 4.17 3.80 - 5.20 M/uL    HGB 13.0 11.5 - 16.0 g/dL    HCT 46.2 35.0 - 47.0 %    .8 (H) 80.0 - 99.0 FL    MCH 31.2 26.0 - 34.0 PG    MCHC 28.1 (L) 30.0 - 36.5 g/dL    RDW 13.6 11.5 - 14.5 %    PLATELET 787 (L) 498 - 400 K/uL    MPV 12.2 8.9 - 12.9 FL    NRBC 0.0 0  WBC    ABSOLUTE NRBC 0.00 0.00 - 0.01 K/uL    NEUTROPHILS 90 (H) 32 - 75 %    LYMPHOCYTES 5 (L) 12 - 49 %    MONOCYTES 3 (L) 5 - 13 %    EOSINOPHILS 1 0 - 7 %    BASOPHILS 0 0 - 1 %    IMMATURE GRANULOCYTES 1 (H) 0.0 - 0.5 %    ABS. NEUTROPHILS 11.0 (H) 1.8 - 8.0 K/UL    ABS. LYMPHOCYTES 0.6 (L) 0.8 - 3.5 K/UL    ABS. MONOCYTES 0.4 0.0 - 1.0 K/UL    ABS. EOSINOPHILS 0.1 0.0 - 0.4 K/UL    ABS. BASOPHILS 0.0 0.0 - 0.1 K/UL    ABS. IMM.  GRANS. 0.1 (H) 0.00 - 0.04 K/UL    DF SMEAR SCANNED      RBC COMMENTS STOMATOCYTES  PRESENT        RBC COMMENTS BASOPHILIC STIPPLING  PRESENT        WBC COMMENTS VACUOLATED POLYS     METABOLIC PANEL, COMPREHENSIVE    Collection Time: 12/02/20  4:34 PM   Result Value Ref Range    Sodium 139 136 - 145 mmol/L    Potassium 4.9 3.5 - 5.1 mmol/L    Chloride 89 (L) 97 - 108 mmol/L    CO2 >45 (HH) 21 - 32 mmol/L    Anion gap Cannot be calculated 5 - 15 mmol/L    Glucose 163 (H) 65 - 100 mg/dL    BUN 24 (H) 6 - 20 MG/DL    Creatinine 0.66 0.55 - 1.02 MG/DL    BUN/Creatinine ratio 36 (H) 12 - 20      GFR est AA >60 >60 ml/min/1.73m2    GFR est non-AA >60 >60 ml/min/1.73m2    Calcium 9.2 8.5 - 10.1 MG/DL    Bilirubin, total 0.8 0.2 - 1.0 MG/DL    ALT (SGPT) 61 12 - 78 U/L    AST (SGOT) 59 (H) 15 - 37 U/L    Alk.  phosphatase 139 (H) 45 - 117 U/L    Protein, total 7.3 6.4 - 8.2 g/dL    Albumin 2.8 (L) 3.5 - 5.0 g/dL    Globulin 4.5 (H) 2.0 - 4.0 g/dL    A-G Ratio 0.6 (L) 1.1 - 2.2     TROPONIN I    Collection Time: 12/02/20  4:34 PM   Result Value Ref Range    Troponin-I, Qt. <0.05 <0.05 ng/mL   NT-PRO BNP    Collection Time: 12/02/20  4:34 PM   Result Value Ref Range    NT pro- (H) <125 PG/ML   URINALYSIS W/ REFLEX CULTURE    Collection Time: 12/02/20  5:24 PM    Specimen: Urine   Result Value Ref Range    Color YELLOW/STRAW      Appearance CLOUDY (A) CLEAR      Specific gravity 1.013 1.003 - 1.030      pH (UA) 7.5 5.0 - 8.0      Protein Negative NEG mg/dL    Glucose Negative NEG mg/dL    Ketone Negative NEG mg/dL    Bilirubin Negative NEG      Blood Negative NEG      Urobilinogen 1.0 0.2 - 1.0 EU/dL    Nitrites Negative NEG      Leukocyte Esterase Negative NEG      WBC 0-4 0 - 4 /hpf    RBC 0-5 0 - 5 /hpf    Epithelial cells FEW FEW /lpf    Bacteria Negative NEG /hpf    UA:UC IF INDICATED CULTURE NOT INDICATED BY UA RESULT CNI      Hyaline cast 2-5 0 - 5 /lpf   POC EG7    Collection Time: 12/02/20  5:50 PM   Result Value Ref Range    Calcium, ionized (POC) 1.23 1.12 - 1.32 mmol/L    pH (POC) 7.35 7.35 - 7.45      pCO2 (POC) >90.0 (H) 35.0 - 45.0 MMHG    pO2 (POC) 43 (LL) 80 - 100 MMHG    Site RIGHT RADIAL      Device: NASAL CANNULA      Flow rate (POC) 3 L/M    Allens test (POC) YES Specimen type (POC) ARTERIAL      Total resp. rate 22     SARS-COV-2    Collection Time: 12/02/20  9:40 PM   Result Value Ref Range    Specimen source Nasopharyngeal      Specimen source Nasopharyngeal      COVID-19 rapid test Not detected NOTD      Specimen type NP Swab      Health status Symptomatic Testing     METABOLIC PANEL, BASIC    Collection Time: 12/03/20  5:59 AM   Result Value Ref Range    Sodium 140 136 - 145 mmol/L    Potassium 4.3 3.5 - 5.1 mmol/L    Chloride 93 (L) 97 - 108 mmol/L    CO2 >45 (HH) 21 - 32 mmol/L    Anion gap Cannot be calculated 5 - 15 mmol/L    Glucose 140 (H) 65 - 100 mg/dL    BUN 24 (H) 6 - 20 MG/DL    Creatinine 0.48 (L) 0.55 - 1.02 MG/DL    BUN/Creatinine ratio 50 (H) 12 - 20      GFR est AA >60 >60 ml/min/1.73m2    GFR est non-AA >60 >60 ml/min/1.73m2    Calcium 8.8 8.5 - 10.1 MG/DL   CBC W/O DIFF    Collection Time: 12/03/20  5:59 AM   Result Value Ref Range    WBC 9.1 3.6 - 11.0 K/uL    RBC 3.91 3.80 - 5.20 M/uL    HGB 12.2 11.5 - 16.0 g/dL    HCT 42.4 35.0 - 47.0 %    .4 (H) 80.0 - 99.0 FL    MCH 31.2 26.0 - 34.0 PG    MCHC 28.8 (L) 30.0 - 36.5 g/dL    RDW 13.6 11.5 - 14.5 %    PLATELET 582 (L) 162 - 400 K/uL    MPV 12.1 8.9 - 12.9 FL    NRBC 0.0 0  WBC    ABSOLUTE NRBC 0.00 0.00 - 0.01 K/uL   POC EG7    Collection Time: 12/03/20  9:07 AM   Result Value Ref Range    Calcium, ionized (POC) 1.33 (H) 1.12 - 1.32 mmol/L    pH (POC) 7.39 7.35 - 7.45      pCO2 (POC) 87.7 (H) 35.0 - 45.0 MMHG    pO2 (POC) 68 (L) 80 - 100 MMHG    HCO3 (POC) 53.6 (H) 22 - 26 MMOL/L    Base excess (POC) 29 mmol/L    sO2 (POC) 91 (L) 92 - 97 %    Site LEFT RADIAL      Device: NASAL CANNULA      Flow rate (POC) 4 L/M    Allens test (POC) YES      Specimen type (POC) ARTERIAL      Total resp.  rate 19     GLUCOSE, POC    Collection Time: 12/03/20 12:03 PM   Result Value Ref Range    Glucose (POC) 234 (H) 65 - 100 mg/dL    Performed by Vivienne Yip        Assessment:     -Acute on chronic hypoxemic and hypercapnic respiratory failure. Sec to COPD exacerbation due to lack of outpatient maintenance treatment and coexistence of suspected JOSLYN/OHS. -suspected JOSLYN/OHS. -suspected COPD with acute exacerbation (no formal PFTs on chart). -Abnormal CT chest. Bibasilar atelectasis and mosaic attenuation.  -Ex smoker.  -Morbid obesity.  -DM2.  -HTN. -Non compliance.  -Poor functional status. Plan:     -Continue scheduled duoneb Q6hrs.  -Continue BiPAP PRN during the day and continuous at night.  -Start azithromycin.  -start pulmicort.  -Agree with solumedrol but lower the dose to 60mg daily for total of 5-7 days. -start incentive spirometry and flutter valve therapy.  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  -She is not on any home inhaler. It is very important that she gets started on maintenance inhalers including LAMA/LABA and ICS. She may not be able to afford inhalers, will consult  to help with resources.  -consider pulmonary rehab.  -Continue xarelto for A fib.  -she can be transferred out of stepdown floor to tele monitoring floor. Prophylaxis:  DVT Protocol Active: On xarelto.

## 2020-12-03 NOTE — H&P
Hospitalist Admission Note    NAME: Paulette Garcia   :  1960   MRN:  [id number]     Date/Time:  2020 8:26 PM    Patient PCP: Jonathon Murrell NP  ______________________________________________________________________  Given the patient's current clinical presentation, I have a high level of concern for decompensation if discharged from the emergency department. Complex decision making was performed, which includes reviewing the patient's available past medical records, laboratory results, and x-ray films. My assessment of this patient's clinical condition and my plan of care is as follows. Assessment / Plan:  Acute on chronic hypoxic and hypercapnic respiratory failure  Acute metabolic encephalopathy  Suspected COPD exacerbation with acute on chronic respiratory failure with hypoxia and hypercapnia  Probable JOSLYN/OHS  Remote tobacco use  -Admit to stepdown  -Continue BiPAP. Patient on 2 L NC during the day at baseline. Given PCO2 greater than 90 on admission we will continue BiPAP this evening and tonight. In attempt to wean back to her NC O2 tomorrow  -IV methylprednisolone  -Has recent negative Covid testing. No known sick contacts but has been in hospital recently. Will repeat Covid swab  -pulmonary consult. Patient was planned for follow-up after last admission with virtual visit due to her difficulty transporting to medical appointments. Ultimately, need to find a way to set her up for PFTs and sleep study  -PRN nebs    CTA chest:  1. No CT evidence for central pulmonary embolus at this time . 2. Stable patchy diffuse mosaic attenuation of lung parenchyma with bibasilar  atelectasis since 4 months ago, probably more suggestive of chronic small  airways lung disease, although atypical infection may present in a similar  fashion. 3. Borderline size of the main pulmonary outflow tract. Correlate for pulmonary  hypertension    Refractory A.  Fib  -Continue amiodarone and Xarelto  -Multiple prior cardioversion procedures  -EKG NSR HR 72 on admission  -considered high risk for recurrent Afib given  LAE with supermorbid obesity    DM2  -does not appear to be on medications, A1C 6.0 2020  -since she will be on steroids, will add sliding scale, consider NPH if she becomes hyperglycemic    Morbid obesity  Impaired mobility  -reportedly mostly bedbound at home  -given her comorbidities, including suspected underlying COPD/OHS/supermorbid obesity rehab is not currently a realistic consideration. Will need to focus on optimizing her respiratory status for now    Code Status: Full  Palliative care consult to discuss code status, goals of care    Surrogate Decision Maker: Trey Cheng, daughter      DVT Prophylaxis: Xarelto  GI Prophylaxis: not indicated    Baseline: Lives at home, mostly bedbound recently per chart notes      Subjective:   CHIEF COMPLAINT: Confusion, SOB    HISTORY OF PRESENT ILLNESS:     Oralia Gonzalez is a 63 y.o.  female with past medical history of chronic hypoxic and hypercapnic respiratory failure with suspected COPD, probable JOSLYN and obesity hypoventilation syndrome, morbid obesity, refractory A. fib, CHF, DM2, HTN, and HLD who is brought to the ED by EMS after being found confused at home, reportedly speaking to  family members. Patient was found to be on 3 L NC, which is baseline, and maintaining saturations. Per chart notes, O2 saturations dropped into the 50s when patient was transferred from the EMS stretcher to hospital bed. Of note, patient was just discharged from [address] [place] [address] 2020 with slurred presentation. She did undergo cardioversion but no A. fib ablation during last hospitalization. Patient was also discharged from here 2020 with similar complaints and seen again in the ED 2020 also with SOB and altered mental status. She currently denies chest pain, nausea, vomiting, or diaphoresis.  She denies fevers or chills. We were asked to admit for work up and evaluation of the above problems. Past Medical History:   Diagnosis Date    Acute on chronic respiratory failure with hypoxia and hypercapnia ([facility]) 11/20/2020    Atrial fibrillation ([facility] 11/4/2020    CHF (congestive heart failure) ([facility])     [facility]     Hypertension     Ill-defined condition     high cholesterol    Ill-defined condition     tachycardia    JOSLYN (obstructive sleep apnea) 11/20/2020    Other and unspecified symptoms and signs involving general sensations and perceptions     ruptured disc    S/P cardiac cath 11/4/2020    11/3/2020 nonobstructive disease        Past Surgical History:   Procedure Laterality Date    HI COMPRE ELECTROPHYSIOL XM W/LEFT ATRIAL PACNG/REC N/A 11/23/2020    Lt Atrial Pace & Record During Ep Study performed by Mukul Olmedo MD at \[facility]"" CARDIAC CATH LAB    HI EPHYS EVAL W/ABLATION SUPRAVENT ARRHYTHMIA N/A 11/23/2020    ABLATION A-FLUTTER performed by Mukul Olmedo MD at \[facility]"" CARDIAC CATH LAB    HI ICAR CATHETER ABLATION ARRHYTHMIA ADD ON N/A 11/23/2020    Ablation Svt/Vt Add On performed by Mukul Olmedo MD at \[facility]\"" CARDIAC CATH LAB    HI INTRACARDIAC ELECTROPHYSIOLOGIC 3D MAPPING N/A 11/23/2020    Ep 3d Mapping performed by Mukul Olmedo MD at \[facility]\"" CARDIAC CATH LAB    STIM/PACING HEART POST IV DRUG INFU N/A 11/23/2020    Drug Stimulation performed by Mukul Olmedo MD at \[facility]\"" CARDIAC CATH LAB       Social History     Tobacco Use    Smoking status: Former Smoker    Smokeless tobacco: Never Used   Substance Use Topics    Alcohol use: No        No family history on file. Unable to obtain  Allergies   Allergen Reactions    Cefazolin Rash    Other Plant, Animal, Environmental Rash     Ivory soap caused rash on hands. Prior to Admission medications    Medication Sig Start Date End Date Taking?  Authorizing Provider   amiodarone (CORDARONE) 200 mg tablet Take 1 Tab by mouth daily for 60 days. 11/25/20 1/24/21  Omar Bahena MD   polyethylene glycol (MIRALAX) 17 gram packet Take 1 Packet by mouth daily for 30 days. 11/25/20 12/25/20  Omar Bahena MD   predniSONE (DELTASONE) 10 mg tablet 4 tabs daily for 2 days; 3 tabs daily for 2 days; 2 tabs daily for 2 days ;1 tab   daily for 2 days 11/24/20   Omar Bahena MD   rivaroxaban (XARELTO) 20 mg tab tablet Take 1 Tab by mouth daily (with dinner) for 60 days. 11/24/20 1/23/21  Omar Bahena MD   menthol-zinc oxide (Moisture Barrier Ointment) 0.44-20.6 % oint Apply 1 Each to affected area three (3) times daily. thin layer moisture associated lesion    Provider, Historical   aspirin 81 mg chewable tablet Take 1 Tab by mouth daily. 11/5/20   Krishna Mandel MD   OXYGEN-AIR DELIVERY SYSTEMS 3 L/min by Nasal route continuous. 8/31/20   Bobby Napier NP   acetaminophen (TYLENOL) 325 mg tablet Take 650 mg by mouth every six (6) hours as needed for Pain. Provider, Historical   furosemide (LASIX) 40 mg tablet Take 1 Tab by mouth daily. 8/7/20   Jose Raul Valladares MD   atorvastatin (LIPITOR) 10 mg tablet Take 10 mg by mouth nightly. Russ Cano MD   metoprolol (LOPRESSOR) 25 mg tablet Take 25 mg by mouth two (2) times a day. Russ Cano MD       REVIEW OF SYSTEMS:     I am not able to complete the review of systems because:    The patient is intubated and sedated    The patient has altered mental status due to his acute medical problems    The patient has baseline aphasia from prior stroke(s)    The patient has baseline dementia and is not reliable historian    The patient is in acute medical distress and unable to provide information           Total of 12 systems reviewed as follows:       POSITIVE= underlined text  Negative = text not underlined  General:  fever, chills, sweats, generalized weakness, weight loss/gain,      loss of appetite   Eyes:    blurred vision, eye pain, loss of vision, double vision  ENT:    rhinorrhea, pharyngitis   Respiratory:   cough, sputum production, SOB, CHOPRA, wheezing, pleuritic pain   Cardiology:   chest pain, palpitations, orthopnea, PND, edema, syncope   Gastrointestinal:  abdominal pain , N/V, diarrhea, dysphagia, constipation, bleeding   Genitourinary:  frequency, urgency, dysuria, hematuria, incontinence   Muskuloskeletal :  arthralgia, myalgia, back pain  Hematology:  easy bruising, nose or gum bleeding, lymphadenopathy   Dermatological: rash, ulceration, pruritis, color change / jaundice  Endocrine:   hot flashes or polydipsia   Neurological:  headache, dizziness, confusion, focal weakness, paresthesia,     Speech difficulties, memory loss, gait difficulty  Psychological: Feelings of anxiety, depression, agitation    Objective:   VITALS:    Visit Vitals  BP (!) 162/87   Pulse 78   Temp 98.3 °F (36.8 °C)   Resp 26   Ht 5' 3\" (1.6 m)   Wt 138 kg (304 lb 3.8 oz)   SpO2 97%   BMI 53.89 kg/m²       PHYSICAL EXAM:    General:    Somnolent, arousable, on bipap, no distress     HEENT: Atraumatic, anicteric sclerae, pink conjunctivae  Lungs:   Distant breath sounds, no rhonchi, rales appreciated. Chest wall:  No tenderness  No Accessory muscle use. Heart:   Regular  rhythm,  No  murmur   No edema  Abdomen:   Soft, non-tender. Not distended. Bowel sounds normal  Extremities: No cyanosis. No clubbing,      Skin turgor normal, Capillary refill normal, Radial dial pulse 2+  Skin:     Not pale. Not Jaundiced  No rashes   Psych:  deferred  Neurologic: EOMs intact. No facial asymmetry. No aphasia or slurred speech. Symmetrical strength, Sensation grossly intact.  Alert and oriented X 4.     _______________________________________________________________________  Care Plan discussed with:    Comments   Patient x    Family      RN x    Care Manager                    Consultant:  racquel COTA MD   _______________________________________________________________________  Expected  Disposition:   Home with Family HH/PT/OT/RN x   SNF/LTC x   JUAN M    ________________________________________________________________________  TOTAL TIME:  75 Minutes    Critical Care Provided     Minutes non procedure based      Comments     Reviewed previous records   >50% of visit spent in counseling and coordination of care  Discussion with patient and/or family and questions answered       ________________________________________________________________________  Signed: Alie Collins DO    Procedures: see electronic medical records for all procedures/Xrays and details which were not copied into this note but were reviewed prior to creation of Plan. LAB DATA REVIEWED:    Recent Results (from the past 24 hour(s))   EKG, 12 LEAD, INITIAL    Collection Time: 12/02/20  4:16 PM   Result Value Ref Range    Ventricular Rate 72 BPM    Atrial Rate 72 BPM    P-R Interval 132 ms    QRS Duration 78 ms    Q-T Interval 402 ms    QTC Calculation (Bezet) 440 ms    Calculated P Axis 48 degrees    Calculated R Axis 83 degrees    Calculated T Axis 52 degrees    Diagnosis       Normal sinus rhythm  Inferior Q waves of questionable significance  When compared with ECG of 18-NOV-2020 12:16,  No significant change was found  Confirmed by Shruthi Vides ([id number]) on 12/2/2020 6:06:45 PM     CBC WITH AUTOMATED DIFF    Collection Time: 12/02/20  4:34 PM   Result Value Ref Range    WBC 12.2 (H) 3.6 - 11.0 K/uL    RBC 4.17 3.80 - 5.20 M/uL    HGB 13.0 11.5 - 16.0 g/dL    HCT 46.2 35.0 - 47.0 %    .8 (H) 80.0 - 99.0 FL    MCH 31.2 26.0 - 34.0 PG    MCHC 28.1 (L) 30.0 - 36.5 g/dL    RDW 13.6 11.5 - 14.5 %    PLATELET 638 (L) 047 - 400 K/uL    MPV 12.2 8.9 - 12.9 FL    NRBC 0.0 0  WBC    ABSOLUTE NRBC 0.00 0.00 - 0.01 K/uL    NEUTROPHILS 90 (H) 32 - 75 %    LYMPHOCYTES 5 (L) 12 - 49 %    MONOCYTES 3 (L) 5 - 13 %    EOSINOPHILS 1 0 - 7 %    BASOPHILS 0 0 - 1 %    IMMATURE GRANULOCYTES 1 (H) 0.0 - 0.5 %    ABS.  NEUTROPHILS 11.0 (H) 1.8 - 8.0 K/UL ABS. LYMPHOCYTES 0.6 (L) 0.8 - 3.5 K/UL    ABS. MONOCYTES 0.4 0.0 - 1.0 K/UL    ABS. EOSINOPHILS 0.1 0.0 - 0.4 K/UL    ABS. BASOPHILS 0.0 0.0 - 0.1 K/UL    ABS. IMM. GRANS. 0.1 (H) 0.00 - 0.04 K/UL    DF SMEAR SCANNED      RBC COMMENTS STOMATOCYTES  PRESENT        RBC COMMENTS BASOPHILIC STIPPLING  PRESENT        WBC COMMENTS VACUOLATED POLYS     METABOLIC PANEL, COMPREHENSIVE    Collection Time: 12/02/20  4:34 PM   Result Value Ref Range    Sodium 139 136 - 145 mmol/L    Potassium 4.9 3.5 - 5.1 mmol/L    Chloride 89 (L) 97 - 108 mmol/L    CO2 >45 (HH) 21 - 32 mmol/L    Anion gap Cannot be calculated 5 - 15 mmol/L    Glucose 163 (H) 65 - 100 mg/dL    BUN 24 (H) 6 - 20 MG/DL    Creatinine 0.66 0.55 - 1.02 MG/DL    BUN/Creatinine ratio 36 (H) 12 - 20      GFR est AA >60 >60 ml/min/1.73m2    GFR est non-AA >60 >60 ml/min/1.73m2    Calcium 9.2 8.5 - 10.1 MG/DL    Bilirubin, total 0.8 0.2 - 1.0 MG/DL    ALT (SGPT) 61 12 - 78 U/L    AST (SGOT) 59 (H) 15 - 37 U/L    Alk.  phosphatase 139 (H) 45 - 117 U/L    Protein, total 7.3 6.4 - 8.2 g/dL    Albumin 2.8 (L) 3.5 - 5.0 g/dL    Globulin 4.5 (H) 2.0 - 4.0 g/dL    A-G Ratio 0.6 (L) 1.1 - 2.2     TROPONIN I    Collection Time: 12/02/20  4:34 PM   Result Value Ref Range    Troponin-I, Qt. <0.05 <0.05 ng/mL   NT-PRO BNP    Collection Time: 12/02/20  4:34 PM   Result Value Ref Range    NT pro- (H) <125 PG/ML   URINALYSIS W/ REFLEX CULTURE    Collection Time: 12/02/20  5:24 PM    Specimen: Urine   Result Value Ref Range    Color YELLOW/STRAW      Appearance CLOUDY (A) CLEAR      Specific gravity 1.013 1.003 - 1.030      pH (UA) 7.5 5.0 - 8.0      Protein Negative NEG mg/dL    Glucose Negative NEG mg/dL    Ketone Negative NEG mg/dL    Bilirubin Negative NEG      Blood Negative NEG      Urobilinogen 1.0 0.2 - 1.0 EU/dL    Nitrites Negative NEG      Leukocyte Esterase Negative NEG      WBC 0-4 0 - 4 /hpf    RBC 0-5 0 - 5 /hpf    Epithelial cells FEW FEW /lpf    Bacteria Negative NEG /hpf    UA:UC IF INDICATED CULTURE NOT INDICATED BY UA RESULT CNI      Hyaline cast 2-5 0 - 5 /lpf

## 2020-12-03 NOTE — PROGRESS NOTES
Spiritual Care Assessment/Progress Note  [facility]      NAME: Radha Arizmendi      MRN: [id number]  AGE: 63 y.o.  SEX: female  Christian Affiliation: Iqra Cagle   Language: English     12/3/2020     Total Time (in minutes): 25     Spiritual Assessment begun in [facility] CRITICAL CARE 3 through conversation with:         [x]Patient        [] Family    [] Friend(s)        Reason for Consult: Palliative Care, Initial/Spiritual Assessment     Spiritual beliefs: (Please include comment if needed)     [x] Identifies with a rosalina tradition:         [] Supported by a rosalina community:            [] Claims no spiritual orientation:           [] Seeking spiritual identity:                [] Adheres to an individual form of spirituality:           [] Not able to assess:                           Identified resources for coping:      [x] Prayer                               [] Music                  [] Guided Imagery     [x] Family/friends                 [] Pet visits     [] Devotional reading                         [] Unknown     [] Other:                                            Interventions offered during this visit: (See comments for more details)    Patient Interventions: Affirmation of emotions/emotional suffering, Affirmation of rosalina, Catharsis/review of pertinent events in supportive environment, Iconic (affirming the presence of God/Higher Power), Normalization of emotional/spiritual concerns, Prayer (actual)           Plan of Care:     [] Support spiritual and/or cultural needs    [] Support AMD and/or advance care planning process      [] Support grieving process   [] Coordinate Rites and/or Rituals    [] Coordination with community clergy   [] No spiritual needs identified at this time   [] Detailed Plan of Care below (See Comments)  [] Make referral to Music Therapy  [] Make referral to Pet Therapy     [] Make referral to Addiction services  [] Make referral to [facility]  [] Make referral to Spiritual Care Partner  [] No future visits requested        [x] Follow up visits as needed     Visited pt for initial spiritual assessment. Pt has seven siblings, three of whom have . She lives with her sister, daughter and granddaughter. She spoke of her ongoing concerns about her medical conditions and her health going forward. Offered presence and listened to her concerns. Prayer was given at her request. She expressed no additional spritual needs at this time. Advised nurse to contact [facility] for any further referrals.     Chaplain Gutierrez MDiv, MS, [facility]  [phone]

## 2020-12-03 NOTE — CONSULTS
PALLIATIVE MEDICINE      Consult received, will see pt tomorrow. Thank you for including Palliative Medicine in this patient's care.    KEVAN Rain

## 2020-12-03 NOTE — ED NOTES
Bedside and Verbal shift change report given to Shawnee (oncoming nurse) by Sakshi Kamara (offgoing nurse). Report included the following information SBAR, ED Summary, MAR and Recent Results.

## 2020-12-03 NOTE — INTERDISCIPLINARY ROUNDS
Critical care interdisciplinary rounds held on 12/3/2020. Following members present, Pharmacy, Diabetes Treatment, Case Management, Respiratory Therapy, Physical Therapy, Palliative Care and Nutrition. Led by Hollie Decker RN and Dr. Lexie Draper. Plan of care discussed. See clinical pathway for plan of care and interventions and desired outcomes.

## 2020-12-03 NOTE — PROGRESS NOTES
Hospitalist Progress Note    NAME: Kelsea Warner   :  1960   MRN:  [id number]     Admit date: 2020    Today's date: 20    PCP: MOISE Jackson M.D. Cell [phone]      Assessment / Plan:  Acute on chronic hypoxic and hypercapnic respiratory failure POA  Acute metabolic encephalopathy POA  Suspected COPD exacerbation POA  Probable JOSLYN/OHS POA  Remote tobacco use  -Currently stepdown hold in ICU  -BIPAP overnight, now on NC O2  - No ABG at admit, ICU team will check ABG now  -Not on CPAP at home, uses NC O2  - Recent COVID tests negative , repeat rapid rest negative  CTA chest:      1. No CT evidence for central pulmonary embolus at this time . 2. Stable patchy diffuse mosaic attenuation of lung parenchyma with bibasilar           atelectasis since 4 months ago, probably more suggestive of chronic small           airways lung disease, although atypical infection may present in a similar           fashion. 3. Borderline size of the main pulmonary outflow tract. Correlate for pulmonary              hypertension  - Scheduled and PRN nebs  - IV steroids, wean to PO  - Pulmonary consult  - PT consult as tolerated    Paroxysmal Atrial Fib POA  - Admit EKG with NSR  - Continue amiodarone and Xarelto  - Multiple prior cardioversion procedures  - Monitor on tele    DM type 2 POA ?  - Does not appear to be on medications, A1C 6.0 2020  - Added SSI and BS checks as on IV steroids  - Check Hgba1c  - Diabetic diet    Morbid obesity POA Body mass index is 52.37 kg/m². Impaired mobility  - Reportedly mostly bedbound at home  - PT consult    Code Status: Full  Palliative care consult to discuss code status, goals of care    Surrogate Decision Maker: Frankey Redbird, daughter    DVT Prophylaxis: Xarelto  GI Prophylaxis: not indicated    Baseline: Lives at home, mostly bedbound recently per chart notes    Body mass index is 52.37 kg/m². Subjective:     Chief Complaint / Reason for Physician Visit  \"my breathing is not good\". Discussed with RN events overnight. Seen in ICU, on 4 liters NC  Alert, talking, complains of SOB, Mild cough  No CP, abdominal pain, N/V, diarrhea, loss of taste or smell, HA , sore throat    Review of Systems:  Symptom Y/N Comments  Symptom Y/N Comments   Fever/Chills n   Chest Pain n    Poor Appetite    Edema     Cough y   Abdominal Pain n    Sputum    Joint Pain     SOB/CHOPRA y   Headache     Nausea/vomit n   Tolerating PT/OT     Diarrhea n   Tolerating Diet y    Constipation    Other       Could NOT obtain due to:      Objective:     VITALS:   Last 24hrs VS reviewed since prior progress note.  Most recent are:  Patient Vitals for the past 24 hrs:   Temp Pulse Resp BP SpO2   12/03/20 0800 97 °F (36.1 °C) 82 30 105/88 99 %   12/03/20 0730  79 30 (!) 125/48 96 %   12/03/20 0700  76 27 (!) 119/37 95 %   12/03/20 0600  72 19 (!) 133/47 100 %   12/03/20 0530  68 17 (!) 120/50 100 %   12/03/20 0500  70 18 (!) 115/45 100 %   12/03/20 0430  80 15 130/68 98 %   12/03/20 0400  76 18 122/84 98 %   12/03/20 0330  72 25 (!) 127/48 96 %   12/03/20 0313     96 %   12/03/20 0300  70 23 (!) 120/50 97 %   12/03/20 0230  73 17 (!) 134/47 100 %   12/03/20 0200  76 18 (!) 142/55 100 %   12/03/20 0130  73 22 (!) 140/53 96 %   12/03/20 0100  76 26 (!) 130/48 95 %   12/03/20 0030  75 22 (!) 125/46 95 %   12/03/20 0000  70 17 104/67 100 %   12/02/20 2345 97.9 °F (36.6 °C) 68 16 (!) 127/41 100 %   12/02/20 2330  72 16  100 %   12/02/20 2315  72 (!) 7  100 %   12/02/20 2303     98 %   12/02/20 2215  73 22 (!) 122/59 99 %   12/02/20 2145  78 20 (!) 126/56 100 %   12/02/20 2045  77 21 (!) 152/71 95 %   12/02/20 1930 98.3 °F (36.8 °C) 78 26 (!) 162/87 97 %   12/02/20 1919     96 %   12/02/20 1822     96 %   12/02/20 1812  74 24  100 %   12/02/20 1803  76 27  (!) 75 %   12/02/20 1745  73 27 (!) 119/48 90 % 12/02/20 1736     95 %   12/02/20 1730  68 27 113/62 94 %   12/02/20 1635  79 29  95 %   12/02/20 1612 98.1 °F (36.7 °C) 72 23 104/66 (!) 88 %     No intake or output data in the 24 hours ending 12/03/20 0814     Wt Readings from Last 12 Encounters:   12/02/20 134.1 kg (295 lb 10.2 oz)   11/23/20 138.1 kg (304 lb 8 oz)   11/03/20 142 kg (313 lb)   09/15/20 140.2 kg (309 lb)   09/04/20 140.2 kg (309 lb)   09/01/20 140.2 kg (309 lb)   08/14/20 142 kg (313 lb)   08/07/20 149.1 kg (328 lb 12.8 oz)   12/27/19 136.1 kg (300 lb)   01/30/19 136.1 kg (300 lb)   11/13/15 134.7 kg (297 lb)   03/17/15 140.1 kg (308 lb 13.8 oz)       PHYSICAL EXAM:    I had a face to face encounter and independently examined this patient on 12/03/20 as outlined below:    General: WD, WN. Alert, cooperative, no acute distress    EENT:  PERRL. Anicteric sclerae. MMM  Resp:  Decreased BS bilaterally, no wheezing. No accessory muscle use  CV:  Regular  rhythm,  No edema  GI:  Soft, Non distended, Non tender. +Bowel sounds, no rebound  LN:  No cervical or inguinal MONICA  Neurologic:  Alert, normal speech, non focal motor exam  Psych:   Not anxious nor agitated  Skin:  No rashes. No jaundice    Reviewed most current lab test results and cultures  YES  Reviewed most current radiology test results   YES  Review and summation of old records today    NO  Reviewed patient's current orders and MAR    YES  PMH/SH reviewed - no change compared to H&P  ________________________________________________________________________  Care Plan discussed with:    Comments   Patient x    Family      RN x    Care Manager     Consultant  x ICU team and Dr Dago Dutta team rounds were held today with , nursing, pharmacist and clinical coordinator. Patient's plan of care was discussed; medications were reviewed and discharge planning was addressed. ________________________________________________________________________      Comments   >50% of visit spent in counseling and coordination of care     ________________________________________________________________________  Wesly Singh MD     Procedures: see electronic medical records for all procedures/Xrays and details which were not copied into this note but were reviewed prior to creation of Plan. LABS:  I reviewed today's most current labs and imaging studies.   Pertinent labs include:  Recent Labs     12/03/20  0559 12/02/20  1634   WBC 9.1 12.2*   HGB 12.2 13.0   HCT 42.4 46.2   * 140*     Recent Labs     12/03/20  0559 12/02/20  1634    139   K 4.3 4.9   CL 93* 89*   CO2 >45* >45*   * 163*   BUN 24* 24*   CREA 0.48* 0.66   CA 8.8 9.2   ALB  --  2.8*   TBILI  --  0.8   ALT  --  61

## 2020-12-03 NOTE — ED NOTES
Bedside shift change report given to Jamari Chavira RN (oncoming nurse) by MARIE Santizo (offgoing nurse). Report included the following information SBAR, Kardex, ED Summary, MAR and Recent Results.       8:01 PM: Delay in completing CTA due to pt being difficult stick. 8:22 PM: Pt transported with RN and RT to CT at this time      8: 30: Hospitalist reports that pt is NPO and to hold PO medication at this time. He also reports he would like to do rapid covid testing but not PCR. 10:45 PM: TRANSFER - OUT REPORT:    Verbal report given to Sharee Mak RN(name) on Jose Maria Barnes  being transferred to CCU(unit) for routine progression of care       Report consisted of patients Situation, Background, Assessment and   Recommendations(SBAR). Information from the following report(s) SBAR, Kardex, ED Summary, [facility] and Recent Results was reviewed with the receiving nurse. Lines:   Peripheral IV 12/02/20 Left Antecubital (Active)   Site Assessment Clean, dry, & intact 12/02/20 1812   Phlebitis Assessment 0 12/02/20 1812   Infiltration Assessment 0 12/02/20 1812   Dressing Status Clean, dry, & intact 12/02/20 1812   Dressing Type Transparent 12/02/20 1812   Hub Color/Line Status Pink;Flushed 12/02/20 1812   Action Taken Blood drawn 12/02/20 1812        Opportunity for questions and clarification was provided.       Patient transported with:   Registered Nurse

## 2020-12-04 PROBLEM — R06.02 SOB (SHORTNESS OF BREATH): Status: ACTIVE | Noted: 2020-12-04

## 2020-12-04 PROBLEM — Z71.89 COUNSELING REGARDING ADVANCE CARE PLANNING AND GOALS OF CARE: Status: ACTIVE | Noted: 2020-12-04

## 2020-12-04 PROBLEM — R53.81 PHYSICAL DECONDITIONING: Status: ACTIVE | Noted: 2020-12-04

## 2020-12-04 LAB
EST. AVERAGE GLUCOSE BLD GHB EST-MCNC: 111 MG/DL
GLUCOSE BLD STRIP.AUTO-MCNC: 172 MG/DL (ref 65–100)
GLUCOSE BLD STRIP.AUTO-MCNC: 195 MG/DL (ref 65–100)
GLUCOSE BLD STRIP.AUTO-MCNC: 258 MG/DL (ref 65–100)
GLUCOSE BLD STRIP.AUTO-MCNC: 82 MG/DL (ref 65–100)
GLUCOSE BLD STRIP.AUTO-MCNC: 85 MG/DL (ref 65–100)
HBA1C MFR BLD: 5.5 % (ref 4–5.6)
SERVICE CMNT-IMP: ABNORMAL
SERVICE CMNT-IMP: NORMAL
SERVICE CMNT-IMP: NORMAL

## 2020-12-04 PROCEDURE — 94640 AIRWAY INHALATION TREATMENT: CPT

## 2020-12-04 PROCEDURE — 74011250637 HC RX REV CODE- 250/637: Performed by: NURSE PRACTITIONER

## 2020-12-04 PROCEDURE — 82962 GLUCOSE BLOOD TEST: CPT

## 2020-12-04 PROCEDURE — 99223 1ST HOSP IP/OBS HIGH 75: CPT | Performed by: NURSE PRACTITIONER

## 2020-12-04 PROCEDURE — 74011636637 HC RX REV CODE- 636/637: Performed by: INTERNAL MEDICINE

## 2020-12-04 PROCEDURE — 65660000000 HC RM CCU STEPDOWN

## 2020-12-04 PROCEDURE — 77010033678 HC OXYGEN DAILY

## 2020-12-04 PROCEDURE — 74011000250 HC RX REV CODE- 250: Performed by: INTERNAL MEDICINE

## 2020-12-04 PROCEDURE — 74011250637 HC RX REV CODE- 250/637: Performed by: HOSPITALIST

## 2020-12-04 PROCEDURE — 74011000250 HC RX REV CODE- 250: Performed by: HOSPITALIST

## 2020-12-04 PROCEDURE — 36415 COLL VENOUS BLD VENIPUNCTURE: CPT

## 2020-12-04 PROCEDURE — 74011250637 HC RX REV CODE- 250/637: Performed by: INTERNAL MEDICINE

## 2020-12-04 PROCEDURE — 3331090002 HH PPS REVENUE DEBIT

## 2020-12-04 PROCEDURE — 74011250636 HC RX REV CODE- 250/636: Performed by: HOSPITALIST

## 2020-12-04 PROCEDURE — 83036 HEMOGLOBIN GLYCOSYLATED A1C: CPT

## 2020-12-04 PROCEDURE — 3331090001 HH PPS REVENUE CREDIT

## 2020-12-04 RX ORDER — HYDRALAZINE HYDROCHLORIDE 20 MG/ML
10 INJECTION INTRAMUSCULAR; INTRAVENOUS
Status: DISCONTINUED | OUTPATIENT
Start: 2020-12-04 | End: 2020-12-08 | Stop reason: HOSPADM

## 2020-12-04 RX ORDER — IPRATROPIUM BROMIDE AND ALBUTEROL SULFATE 2.5; .5 MG/3ML; MG/3ML
3 SOLUTION RESPIRATORY (INHALATION)
Status: DISCONTINUED | OUTPATIENT
Start: 2020-12-04 | End: 2020-12-08 | Stop reason: HOSPADM

## 2020-12-04 RX ORDER — PREDNISONE 20 MG/1
40 TABLET ORAL
Status: DISCONTINUED | OUTPATIENT
Start: 2020-12-05 | End: 2020-12-07

## 2020-12-04 RX ORDER — MICONAZOLE NITRATE 20 MG/G
CREAM TOPICAL 2 TIMES DAILY
Status: DISCONTINUED | OUTPATIENT
Start: 2020-12-04 | End: 2020-12-08 | Stop reason: HOSPADM

## 2020-12-04 RX ORDER — HYDRALAZINE HYDROCHLORIDE 20 MG/ML
20 INJECTION INTRAMUSCULAR; INTRAVENOUS ONCE
Status: DISCONTINUED | OUTPATIENT
Start: 2020-12-04 | End: 2020-12-04

## 2020-12-04 RX ORDER — BALSAM PERU/CASTOR OIL
OINTMENT (GRAM) TOPICAL 2 TIMES DAILY
Status: DISCONTINUED | OUTPATIENT
Start: 2020-12-04 | End: 2020-12-08 | Stop reason: HOSPADM

## 2020-12-04 RX ADMIN — AZITHROMYCIN 500 MG: 250 TABLET, FILM COATED ORAL at 09:55

## 2020-12-04 RX ADMIN — ACETAMINOPHEN 650 MG: 325 TABLET ORAL at 09:55

## 2020-12-04 RX ADMIN — RIVAROXABAN 20 MG: 20 TABLET, FILM COATED ORAL at 16:55

## 2020-12-04 RX ADMIN — INSULIN LISPRO 2 UNITS: 100 INJECTION, SOLUTION INTRAVENOUS; SUBCUTANEOUS at 12:40

## 2020-12-04 RX ADMIN — METHYLPREDNISOLONE SODIUM SUCCINATE 60 MG: 125 INJECTION, POWDER, FOR SOLUTION INTRAMUSCULAR; INTRAVENOUS at 09:56

## 2020-12-04 RX ADMIN — AMIODARONE HYDROCHLORIDE 200 MG: 200 TABLET ORAL at 09:55

## 2020-12-04 RX ADMIN — INSULIN LISPRO 5 UNITS: 100 INJECTION, SOLUTION INTRAVENOUS; SUBCUTANEOUS at 16:55

## 2020-12-04 RX ADMIN — ASPIRIN 81 MG CHEWABLE TABLET 81 MG: 81 TABLET CHEWABLE at 09:55

## 2020-12-04 RX ADMIN — Medication 20 ML: at 14:00

## 2020-12-04 RX ADMIN — MICONAZOLE NITRATE: 20 CREAM TOPICAL at 14:00

## 2020-12-04 RX ADMIN — Medication 10 ML: at 09:56

## 2020-12-04 RX ADMIN — ACETAMINOPHEN 650 MG: 325 TABLET ORAL at 23:56

## 2020-12-04 RX ADMIN — METOPROLOL TARTRATE 25 MG: 25 TABLET, FILM COATED ORAL at 18:03

## 2020-12-04 RX ADMIN — IPRATROPIUM BROMIDE AND ALBUTEROL SULFATE 3 ML: .5; 3 SOLUTION RESPIRATORY (INHALATION) at 13:13

## 2020-12-04 RX ADMIN — IPRATROPIUM BROMIDE AND ALBUTEROL SULFATE 3 ML: .5; 3 SOLUTION RESPIRATORY (INHALATION) at 20:55

## 2020-12-04 RX ADMIN — ATORVASTATIN CALCIUM 10 MG: 10 TABLET, FILM COATED ORAL at 20:35

## 2020-12-04 RX ADMIN — CASTOR OIL AND BALSAM, PERU: 788; 87 OINTMENT TOPICAL at 18:05

## 2020-12-04 RX ADMIN — METOPROLOL TARTRATE 25 MG: 25 TABLET, FILM COATED ORAL at 09:55

## 2020-12-04 RX ADMIN — CASTOR OIL AND BALSAM, PERU: 788; 87 OINTMENT TOPICAL at 09:56

## 2020-12-04 RX ADMIN — IPRATROPIUM BROMIDE AND ALBUTEROL SULFATE 3 ML: .5; 3 SOLUTION RESPIRATORY (INHALATION) at 15:05

## 2020-12-04 RX ADMIN — MICONAZOLE NITRATE: 20 CREAM TOPICAL at 18:07

## 2020-12-04 RX ADMIN — BUDESONIDE 500 MCG: 0.5 INHALANT RESPIRATORY (INHALATION) at 08:29

## 2020-12-04 RX ADMIN — FUROSEMIDE 40 MG: 40 TABLET ORAL at 09:55

## 2020-12-04 RX ADMIN — Medication 1 AMPULE: at 09:56

## 2020-12-04 RX ADMIN — BUDESONIDE 500 MCG: 0.5 INHALANT RESPIRATORY (INHALATION) at 20:55

## 2020-12-04 RX ADMIN — Medication 20 ML: at 07:54

## 2020-12-04 RX ADMIN — IPRATROPIUM BROMIDE AND ALBUTEROL SULFATE 3 ML: .5; 3 SOLUTION RESPIRATORY (INHALATION) at 08:29

## 2020-12-04 NOTE — WOUND CARE
Wound Care consult for the gluteal cleft and sacrum / buttocks wounds that were present on admission. Patient was seen by me on 11-2-20 for the initial wound that was also POA at that time. Pt. Was discharged home 11-24-20 and was then readmitted for hypoxia on 12-2-20. Patient stated that her daughter was taking care of the wound and patient was staying off of it at home. She is being turned / off- loaded here in the CCU where she is currently admitted. Assessment of the sacrum / buttocks today: the DTI wound is   Resolving with some scarring. Now dry and closed but still  Discolored. Patient is still incontinent. Comparison photo from 11-2-20 below:     Media Information  11-2-20    Document Information     Mobile Media Capture    Gluteal cleft and medial buttocks DTI    11/02/2020 12:12    Attached To: Hospital Encounter on 10/31/20    Source Information     Plan / orders today are related to the skin condition as it presents now as a dry skin surface. We will treat with Venelex to keep the skin supple and protected from moisture with continued healing. Patient understands that she will need to move from side to side in order to prevent further skin injury. She also understands that her heels need to remain off of the bed with floating.    Patient also has some yeast dermatitis that we will treat with Secura Antifungal cream.   Carlie Huffman RN, BSN, [facility]

## 2020-12-04 NOTE — PROGRESS NOTES
0730 Bedside and Verbal shift change report given to Latha Peraza (oncoming nurse) by Sandra Edouard (offgoing nurse). Report included the following information SBAR, Kardex, Procedure Summary, Intake/Output, MAR, Recent Results and Cardiac Rhythm NSR.   0800 pt assessed per flowsheet. Able to reduce O2 from 5 l/m to 3 l/m via NC while maintaining sats of 97%. Pt c/o pain 8/10 bilat knees, chronic pain. Requesting Tylenol for pain when she gets her breakfast and is able to take her am meds. Pt repositioned as milagros for comfort. 1000 pain controled with Tylenol. 1100 IDR complete with Dr Sana Green and team, plan transfer to PCU today when bed available. 1200 pt reassessed per flowsheet. [address] from wound care in to assess pt. Recommends venelex to sacrum and heels, Secura to skin folds (under breasts, pannus and groin). This has been applied per request.  0180 TRANSFER - IN REPORT:    Verbal report received from Michelle(name) on Natalie Braga  being received from [facility](unit) for routine progression of care      Report consisted of patients Situation, Background, Assessment and   Recommendations(SBAR). Information from the following report(s) SBAR, Kardex, Procedure Summary, Intake/Output, MAR, Recent Results and Cardiac Rhythm NSR was reviewed with the receiving nurse. Opportunity for questions and clarification was provided. Assessment completed upon patients arrival to unit and care assumed. 88 Beatrice Felder NP in to speak with pt, updates provided to chart. Pt transferred to 2268 via bed.  Pt states that she would like to call her family and notify them of updates

## 2020-12-04 NOTE — PROGRESS NOTES
1553 .TRANSFER - IN REPORT:    Verbal report received from Jian Asp (name) on Paulette Garcia  being received from CCU(unit) for routine progression of care      Report consisted of patients Situation, Background, Assessment and   Recommendations(SBAR). Information from the following report(s) SBAR, Kardex and MAR was reviewed with the receiving nurse. Opportunity for questions and clarification was provided. Assessment completed upon patients arrival to unit and care assumed. Kamila Hensley .Primary Nurse Elizabeth Dela Cruz and Ramonita Slater RN performed a dual skin assessment on this patient Impairment noted- see wound doc flow sheet  Gaston score is 15    . Kamila Hensley End of Shift Note    Bedside shift change report given to Micheal Fan (oncoming nurse) by Elizabeth Dela Cruz (offgoing nurse). Report included the following information SBAR, Kardex and MAR    Shift worked:  [id number]     Shift summary and any significant changes:     None     Concerns for physician to address:  None     Zone phone for oncoming shift:   [phone]       Activity:  Activity Level: Bed Rest  Number times ambulated in hallways past shift: 0  Number of times OOB to chair past shift: 0    Cardiac:   Cardiac Monitoring: Yes      Cardiac Rhythm: Normal sinus rhythm    Access:   Current line(s): PIV     Genitourinary:   Urinary status: external catheter    Respiratory:   O2 Device: Nasal cannula  Chronic home O2 use?: YES  Incentive spirometer at bedside: NO     GI:     Current diet:  DIET DIABETIC CONSISTENT CARB Regular  Passing flatus: YES  Tolerating current diet: YES       Pain Management:   Patient states pain is manageable on current regimen: YES    Skin:  Gaston Score: 16  Interventions: turn team    Patient Safety:  Fall Score:  Total Score: 3  Interventions: bed/chair alarm  High Fall Risk: Yes    Length of Stay:  Expected LOS: 3d 14h  Actual LOS: 2      Elizabeth Dela Cruz

## 2020-12-04 NOTE — PROGRESS NOTES
Received message from patient's nurse Nadja Dennis stating : This is a PCU overflow patient, here for COPD exacerbation. The patient's SBP has been steadily increasing throughout the last few hours & she does not have anything ordered in the [facility]. Most recent /68. Is there anything you would want to give her, or should I just continue to monitor her pressure? Discussion / orders:    Hydralazine 10 mg iv q6h prn sbp>160 or dbp>100         Please note that this note was dictated using Dragon computer voice recognition software. Quite often unanticipated grammatical, syntax, homophones, and other interpretive errors are inadvertently transcribed by the computer software. Please disregard these errors. Please excuse any errors that have escaped final proofreading.

## 2020-12-04 NOTE — PROGRESS NOTES
1900- Bedside change of shift report received from [facility] (offgoing nurse). 2000- Assessment complete; see flowsheets for details. Patient is A&Ox4. Nickel size skin tear present on left buttock. Moisture related skin breakdown underneath right and left breast and under pannus. Redness & moisture related skin breakdown present on right & left groin. All areas of skin breakdown cleansed with soap & water and zinc paste applied. Will consult wound care. 0000- Patient's SBP has been steadily increasing over the last few hours, with the most recent /68. At this time, there are no PRN orders for hypertensive episodes. On call NP notified and new orders placed for PRN hydralazine.

## 2020-12-04 NOTE — INTERDISCIPLINARY ROUNDS
Interdisciplinary team rounds were held 12/4/2020 with the following team members:Care Management, Diabetes Treatment Specialist, Nursing, Nutrition, Pharmacy, Physical Therapy, Physician and Respiratory Therapy. Plan of care discussed. See clinical pathway and/or care plan for interventions and desired outcomes.

## 2020-12-04 NOTE — CONSULTS
Palliative Medicine Consult  Miguel A: [phone] ([phone])    Patient Name: Barron Goff  YOB: 1960    Date of Initial Consult: 12/4/2020  Reason for Consult: goals of care  Requesting Provider: Misha Del Real MD  Primary Care Physician: Juanita Frazier NP     SUMMARY:   Barron Goff is a 63 y.o. with a past history of atrial fibrillation, COPD, congestive heart failure, who was admitted on 12/2/2020 from home with a diagnosis of acute on chronic respiratory failure with hypoxia and hypercapnia. Current medical issues leading to Palliative Medicine involvement include: frequent admissions (monthly x3), bed bound. Psychosocial: lives at home with sister, daughter and granddaughter. 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 Has been bed bound x 1-2 months due to knee pain. SHE DOES NOT HAVE A BIPAP AT HOME. PALLIATIVE DIAGNOSES:   1. SOB  2. Confusion   3. Diarrhea   4. Morbid obesity   5. Pickwickian syndrome (obesity hypoventilation syndrome)  6. Goals of care  7. Physical debility/deconditioning      PLAN:   1. Prior to visit, I completed an extensive review of patient's medical records, including medical documentation, vital signs, MARs, and results of various labs and other diagnostics. I also spoke with patient's nurse Lilian Nicolas and pulmonologist/intensivist Dr. Trisha Cerrato. 2. Met with pt, obtained history, discussed her current medical condition and goals of care (see acp note). 3. Goals are clear, pt wants to continue to receive full restorative care, full code, intubation, feeding tube. She does not want to be kept alive on machines if no hopes of recovery. She is an organ donor. Will sign off. Please re-consult if Palliative can be of further service.    4. Initial consult note routed to primary continuity provider and/or primary health care team members  5. Communicated plan of care with: Palliative IDTMaribel 192 Team     GOALS OF CARE / TREATMENT PREFERENCES:     GOALS OF CARE:  Patient/Health Care Proxy Stated Goals: Prolong life    TREATMENT PREFERENCES:   Code Status: Full Code    Advance Care Planning:  [x] The [facility] Interdisciplinary Team has updated the ACP Navigator with [place] and Patient Capacity      Primary Decision Maker (Active): Lucy Bingham - Child - [phone]    Secondary Decision Maker: Samara Cristina - Sister - [phone]  Advance Care Planning 12/4/2020   Patient's Healthcare Decision Maker is: Named in scanned ACP document   Confirm Advance Directive Yes, on file   Does the patient have other document types MOST/MOLST/POST/POLST     Medical Interventions: Full interventions       Artificially Administered Nutrition: Feeding tube long-term, if indicated     Other:    As far as possible, the palliative care team has discussed with patient / health care proxy about goals of care / treatment preferences for patient. HISTORY:     History obtained from: chart, patient    CHIEF COMPLAINT: confusion    HPI/SUBJECTIVE:    The patient is:   [x] Verbal and participatory  [] Non-participatory due to:     12/2: BIBA for evaluation of confusion: family found pt very confused today and slightly altered from her baseline. Upon arrival EMS noticed pt lying flat in bed on her 3L NC. They had difficulty finding an 02 sat, however, pt was alert and answering all questions appropriately, oriented x 4. Pt was covered in stool. Pt reports feeling SOB. She is on Bipap at home. CXR: Perihilar interstitial prominence with stable mild parenchymal opacification suggesting edema or small airways disease. Bibasilar atelectasis. .  . Chest CT: 1. No CT evidence for central pulmonary embolus at this time.    2. Stable patchy diffuse mosaic attenuation of lung parenchyma with bibasilar atelectasis since 4 months ago, probably more suggestive of chronic small airways lung disease, although atypical infection may present in a similar fashion. 3. Borderline size of the main pulmonary outflow tract. Correlate for pulmonary hypertension. 12/4: pt reports mild SOB which she is at baseline. No pain now, however, if she tries to bear weight her knees hurt severely. Clinical Pain Assessment (nonverbal scale for severity on nonverbal patients):   Clinical Pain Assessment  Severity: 0     Activity (Movement): Lying quietly, normal position    Duration: for how long has pt been experiencing pain (e.g., 2 days, 1 month, years)  Frequency: how often pain is an issue (e.g., several times per day, once every few days, constant)     FUNCTIONAL ASSESSMENT:     Palliative Performance Scale (PPS):  PPS: 20       PSYCHOSOCIAL/SPIRITUAL SCREENING:     Palliative IDT has assessed this patient for cultural preferences / practices and a referral made as appropriate to needs (Cultural Services, Patient Advocacy, Ethics, etc.)    Any spiritual / Latter-day concerns:  [] Yes /  [x] No    Caregiver Burnout:  [] Yes /  [x] No /  [] No Caregiver Present      Anticipatory grief assessment:   [x] Normal  / [] Maladaptive       ESAS Anxiety: Anxiety: 0    ESAS Depression: Depression: 0        REVIEW OF SYSTEMS:     Positive and pertinent negative findings in ROS are noted above in HPI. The following systems were [x] reviewed / [] unable to be reviewed as noted in HPI  Other findings are noted below. Systems: constitutional, ears/nose/mouth/throat, respiratory, gastrointestinal, genitourinary, musculoskeletal, integumentary, neurologic, psychiatric, endocrine. Positive findings noted below.   Modified ESAS Completed by: provider   Fatigue: 7 Drowsiness: 0   Depression: 0 Pain: 0   Anxiety: 0 Nausea: 0   Anorexia: 0 Dyspnea: 2     Constipation: No              PHYSICAL EXAM:     From RN flowsheet:  Wt Readings from Last 3 Encounters:   12/04/20 292 lb 1.8 oz (132.5 kg)   11/23/20 304 lb 8 oz (138.1 kg)   11/03/20 313 lb (142 kg)     Blood pressure (!) 129/41, pulse 78, temperature 98.8 °F (37.1 °C), resp. rate 29, height 5' 3\" (1.6 m), weight 292 lb 1.8 oz (132.5 kg), SpO2 94 %, not currently breastfeeding.     Pain Scale 1: Numeric (0 - 10)  Pain Intensity 1: 0     Pain Location 1: Knee  Pain Orientation 1: Left, Right  Pain Description 1: Aching  Pain Intervention(s) 1: Medication (see MAR)  Last bowel movement, if known:     Constitutional: awake, alert, oriented, NAD, pleasant and cooperative  Eyes: pupils equal, anicteric  ENMT: no nasal discharge, moist mucous membranes  Cardiovascular: regular rhythm, distal pulses intact  Respiratory: breathing not labored, symmetric  Gastrointestinal: soft non-tender, +bowel sounds  Musculoskeletal: no deformity, no tenderness to palpation  Skin: warm, dry  Neurologic: following commands, moving all extremities  Psychiatric: full affect, no hallucinations          HISTORY:     Active Problems:    Acute on chronic respiratory failure with hypoxia and hypercapnia (HCC) (11/20/2020)      Past Medical History:   Diagnosis Date    Acute on chronic respiratory failure with hypoxia and hypercapnia (HCC) 11/20/2020    Atrial fibrillation ([facility] 11/4/2020    CHF (congestive heart failure) ([facility]     Diabetes ([facility]     Hypertension     Ill-defined condition     high cholesterol    Ill-defined condition     tachycardia    JOSLYN (obstructive sleep apnea) 11/20/2020    Other and unspecified symptoms and signs involving general sensations and perceptions     ruptured disc    S/P cardiac cath 11/4/2020    11/3/2020 nonobstructive disease      Past Surgical History:   Procedure Laterality Date    WA COMPRE ELECTROPHYSIOL XM W/LEFT ATRIAL PACNG/REC N/A 11/23/2020    Lt Atrial Pace & Record During Ep Study performed by Kianna Quiroz MD at [facility] CARD[facility]AC CATH LAB    WA AROLDO ALEGRE W/ABLATION SUPRAVENT ARRHYTHMIA N/A 11/23/2020    ABLATION A-FLUTTER performed by Toño Hilton MD at \[facility]"" CARDIAC CATH LAB    ME ICAR CATHETER ABLATION ARRHYTHMIA ADD ON N/A 11/23/2020    Ablation Svt/Vt Add On performed by Toño Hilton MD at [facility] CARDIAC CATH LAB    ME INTRACARDIAC ELECTROPHYSIOLOGIC 3D MAPPING N/A 11/23/2020    Ep 3d Mapping performed by Toño Hilton MD at [facility] CARDIAC CATH LAB    STIM/PACING HEART POST IV DRUG INFU N/A 11/23/2020    Drug Stimulation performed by Toño Hilton MD at [facility] CARDIAC CATH LAB      No family history on file. History reviewed, no pertinent family history. Social History     Tobacco Use    Smoking status: Former Smoker    Smokeless tobacco: Never Used   Substance Use Topics    Alcohol use: No     Allergies   Allergen Reactions    Cefazolin Rash    Other Plant, Animal, Environmental Rash     Ivory soap caused rash on hands.       Current Facility-Administered Medications   Medication Dose Route Frequency    hydrALAZINE (APRESOLINE) 20 mg/mL injection 10 mg  10 mg IntraVENous Q6H PRN    balsam peru-castor oiL (VENELEX) ointment   Topical BID    miconazole (SECURA) 2 % extra thick cream   Topical BID    acetaminophen (TYLENOL) tablet 650 mg  650 mg Oral Q6H PRN    albuterol-ipratropium (DUO-NEB) 2.5 MG-0.5 MG/3 ML  3 mL Nebulization QID RT    glucose chewable tablet 16 g  4 Tab Oral PRN    dextrose (D50W) injection syrg 12.5-25 g  25-50 mL IntraVENous PRN    glucagon (GLUCAGEN) injection 1 mg  1 mg IntraMUSCular PRN    insulin lispro (HUMALOG) injection   SubCUTAneous AC&HS    alcohol 62% (NOZIN) nasal  1 Ampule  1 Ampule Topical Q12H    azithromycin (ZITHROMAX) tablet 500 mg  500 mg Oral DAILY    methylPREDNISolone (PF) (SOLU-MEDROL) injection 60 mg  60 mg IntraVENous DAILY    budesonide (PULMICORT) 500 mcg/2 ml nebulizer suspension  500 mcg Nebulization BID RT    sodium chloride (NS) flush 5-40 mL  5-40 mL IntraVENous Q8H    sodium chloride (NS) flush 5-40 mL  5-40 mL IntraVENous PRN    amiodarone (CORDARONE) tablet 200 mg  200 mg Oral DAILY    aspirin chewable tablet 81 mg  81 mg Oral DAILY    atorvastatin (LIPITOR) tablet 10 mg  10 mg Oral QHS    furosemide (LASIX) tablet 40 mg  40 mg Oral DAILY    rivaroxaban (XARELTO) tablet 20 mg  20 mg Oral DAILY WITH DINNER    metoprolol tartrate (LOPRESSOR) tablet 25 mg  25 mg Oral BID    albuterol-ipratropium (DUO-NEB) 2.5 MG-0.5 MG/3 ML  3 mL Nebulization Q4H PRN          LAB AND IMAGING FINDINGS:     Lab Results   Component Value Date/Time    WBC 9.1 12/03/2020 05:59 AM    HGB 12.2 12/03/2020 05:59 AM    PLATELET 523 (L) 21/70/7899 05:59 AM     Lab Results   Component Value Date/Time    Sodium 140 12/03/2020 05:59 AM    Potassium 4.3 12/03/2020 05:59 AM    Chloride 93 (L) 12/03/2020 05:59 AM    CO2 >45 (HH) 12/03/2020 05:59 AM    BUN 24 (H) 12/03/2020 05:59 AM    Creatinine 0.48 (L) 12/03/2020 05:59 AM    Calcium 8.8 12/03/2020 05:59 AM    Magnesium 2.5 (H) 11/22/2020 04:38 AM    Phosphorus 2.6 11/20/2020 12:00 AM      Lab Results   Component Value Date/Time    Alk.  phosphatase 139 (H) 12/02/2020 04:34 PM    Protein, total 7.3 12/02/2020 04:34 PM    Albumin 2.8 (L) 12/02/2020 04:34 PM    Globulin 4.5 (H) 12/02/2020 04:34 PM     Lab Results   Component Value Date/Time    INR 1.4 (H) 11/24/2020 02:59 AM    Prothrombin time 14.6 (H) 11/24/2020 02:59 AM    aPTT 27.0 07/26/2020 05:34 AM      Lab Results   Component Value Date/Time    Ferritin 159 11/18/2020 12:36 PM      No results found for: PH, PCO2, PO2  No components found for: [place]   Lab Results   Component Value Date/Time    CK 24 (L) 11/01/2020 04:33 AM    CK - MB <1.0 11/01/2020 04:33 AM                Total time:   Counseling / coordination time, spent as noted above:   > 50% counseling / coordination?:     Prolonged service was provided for  []30 min   []75 min in face to face time in the presence of the patient, spent as noted above.  Time Start:   Time End:   Note: this can only be billed with 31218 (initial) or 78166 (follow up). If multiple start / stop times, list each separately.

## 2020-12-04 NOTE — ACP (ADVANCE CARE PLANNING)
Advance Care Planning     Advance Care Planning (ACP) Physician/NP/PA Conversation      Date of Conversation: 12/2/2020  Conducted with: Patient with Decision Making Capacity    Healthcare Decision Maker:     Primary Decision Maker (Active): Kianna Cadet - George - [phone]    Secondary Decision Maker: Devika Smith - Sister - [phone]      Today we documented Decision Maker(s) consistent with ACP documents on file. Care Preferences:    Hospitalization: \"If your health worsens and it becomes clear that your chance of recovery is unlikely, what would be your preference regarding hospitalization? \"  The patient would prefer hospitalization. Ventilation: \"If you were unable to breathe on your own and your chance of recovery was unlikely, what would be your preference about the use of a ventilator (breathing machine) if it was available to you? \"   The patient would desire the use of a ventilator. \"I do not want to be kept alive on machines if there is no hope of recovery. \"    Resuscitation: \"In the event your heart stopped as a result of an underlying serious health condition, would you want attempts to be made to restart your heart, or would you prefer a natural death? \"   Yes, attempt to resuscitate.     Additional topics discussed: benefit/burden of treatment options, artificial nutrition, ventilation preferences, hospitalization preferences, resuscitation preferences and end of life care preferences (vegetative state/imminent death)    Conversation Outcomes / Follow-Up Plan:   ACP complete - no further action today  Reviewed DNR/DNI and patient elects Full Code (Attempt Resuscitation) and is an organ donor    Length of Voluntary ACP Conversation in minutes:  30 minutes    Gil Sam NP

## 2020-12-05 LAB
ALBUMIN SERPL-MCNC: 2.7 G/DL (ref 3.5–5)
ALBUMIN/GLOB SERPL: 0.8 {RATIO} (ref 1.1–2.2)
ALP SERPL-CCNC: 106 U/L (ref 45–117)
ALT SERPL-CCNC: 52 U/L (ref 12–78)
ANION GAP SERPL CALC-SCNC: ABNORMAL MMOL/L (ref 5–15)
AST SERPL-CCNC: 51 U/L (ref 15–37)
BASOPHILS # BLD: 0 K/UL (ref 0–0.1)
BASOPHILS NFR BLD: 0 % (ref 0–1)
BILIRUB SERPL-MCNC: 1 MG/DL (ref 0.2–1)
BUN SERPL-MCNC: 30 MG/DL (ref 6–20)
BUN/CREAT SERPL: 60 (ref 12–20)
CALCIUM SERPL-MCNC: 8.8 MG/DL (ref 8.5–10.1)
CHLORIDE SERPL-SCNC: 94 MMOL/L (ref 97–108)
CO2 SERPL-SCNC: 45 MMOL/L (ref 21–32)
CREAT SERPL-MCNC: 0.5 MG/DL (ref 0.55–1.02)
DIFFERENTIAL METHOD BLD: ABNORMAL
EOSINOPHIL # BLD: 0 K/UL (ref 0–0.4)
EOSINOPHIL NFR BLD: 0 % (ref 0–7)
ERYTHROCYTE [DISTWIDTH] IN BLOOD BY AUTOMATED COUNT: 13.6 % (ref 11.5–14.5)
GLOBULIN SER CALC-MCNC: 3.2 G/DL (ref 2–4)
GLUCOSE BLD STRIP.AUTO-MCNC: 109 MG/DL (ref 65–100)
GLUCOSE BLD STRIP.AUTO-MCNC: 112 MG/DL (ref 65–100)
GLUCOSE BLD STRIP.AUTO-MCNC: 161 MG/DL (ref 65–100)
GLUCOSE BLD STRIP.AUTO-MCNC: 189 MG/DL (ref 65–100)
GLUCOSE SERPL-MCNC: 106 MG/DL (ref 65–100)
HCT VFR BLD AUTO: 36.4 % (ref 35–47)
HGB BLD-MCNC: 11 G/DL (ref 11.5–16)
IMM GRANULOCYTES # BLD AUTO: 0.1 K/UL (ref 0–0.04)
IMM GRANULOCYTES NFR BLD AUTO: 1 % (ref 0–0.5)
LYMPHOCYTES # BLD: 0.5 K/UL (ref 0.8–3.5)
LYMPHOCYTES NFR BLD: 6 % (ref 12–49)
MCH RBC QN AUTO: 31 PG (ref 26–34)
MCHC RBC AUTO-ENTMCNC: 30.2 G/DL (ref 30–36.5)
MCV RBC AUTO: 102.5 FL (ref 80–99)
MONOCYTES # BLD: 0.5 K/UL (ref 0–1)
MONOCYTES NFR BLD: 5 % (ref 5–13)
NEUTS SEG # BLD: 7.9 K/UL (ref 1.8–8)
NEUTS SEG NFR BLD: 88 % (ref 32–75)
NRBC # BLD: 0 K/UL (ref 0–0.01)
NRBC BLD-RTO: 0 PER 100 WBC
PLATELET # BLD AUTO: 102 K/UL (ref 150–400)
PMV BLD AUTO: 12.8 FL (ref 8.9–12.9)
POTASSIUM SERPL-SCNC: 4 MMOL/L (ref 3.5–5.1)
PROT SERPL-MCNC: 5.9 G/DL (ref 6.4–8.2)
RBC # BLD AUTO: 3.55 M/UL (ref 3.8–5.2)
RBC MORPH BLD: ABNORMAL
RBC MORPH BLD: ABNORMAL
SERVICE CMNT-IMP: ABNORMAL
SODIUM SERPL-SCNC: 138 MMOL/L (ref 136–145)
WBC # BLD AUTO: 9 K/UL (ref 3.6–11)

## 2020-12-05 PROCEDURE — 74011250637 HC RX REV CODE- 250/637: Performed by: INTERNAL MEDICINE

## 2020-12-05 PROCEDURE — 77010033678 HC OXYGEN DAILY

## 2020-12-05 PROCEDURE — 74011250637 HC RX REV CODE- 250/637: Performed by: NURSE PRACTITIONER

## 2020-12-05 PROCEDURE — 82962 GLUCOSE BLOOD TEST: CPT

## 2020-12-05 PROCEDURE — 94640 AIRWAY INHALATION TREATMENT: CPT

## 2020-12-05 PROCEDURE — 3331090002 HH PPS REVENUE DEBIT

## 2020-12-05 PROCEDURE — 36415 COLL VENOUS BLD VENIPUNCTURE: CPT

## 2020-12-05 PROCEDURE — 74011000250 HC RX REV CODE- 250: Performed by: INTERNAL MEDICINE

## 2020-12-05 PROCEDURE — 3331090001 HH PPS REVENUE CREDIT

## 2020-12-05 PROCEDURE — 74011636637 HC RX REV CODE- 636/637: Performed by: INTERNAL MEDICINE

## 2020-12-05 PROCEDURE — 65660000000 HC RM CCU STEPDOWN

## 2020-12-05 PROCEDURE — 80053 COMPREHEN METABOLIC PANEL: CPT

## 2020-12-05 PROCEDURE — 74011250637 HC RX REV CODE- 250/637: Performed by: HOSPITALIST

## 2020-12-05 PROCEDURE — 74011000250 HC RX REV CODE- 250: Performed by: HOSPITALIST

## 2020-12-05 PROCEDURE — 85025 COMPLETE CBC W/AUTO DIFF WBC: CPT

## 2020-12-05 RX ADMIN — AZITHROMYCIN 500 MG: 250 TABLET, FILM COATED ORAL at 09:33

## 2020-12-05 RX ADMIN — ATORVASTATIN CALCIUM 10 MG: 10 TABLET, FILM COATED ORAL at 21:28

## 2020-12-05 RX ADMIN — MICONAZOLE NITRATE: 20 CREAM TOPICAL at 18:00

## 2020-12-05 RX ADMIN — IPRATROPIUM BROMIDE AND ALBUTEROL SULFATE 3 ML: .5; 3 SOLUTION RESPIRATORY (INHALATION) at 20:02

## 2020-12-05 RX ADMIN — BUDESONIDE 500 MCG: 0.5 INHALANT RESPIRATORY (INHALATION) at 07:29

## 2020-12-05 RX ADMIN — METOPROLOL TARTRATE 25 MG: 25 TABLET, FILM COATED ORAL at 17:40

## 2020-12-05 RX ADMIN — HUMAN INSULIN 16 UNITS: 100 INJECTION, SUSPENSION SUBCUTANEOUS at 10:41

## 2020-12-05 RX ADMIN — IPRATROPIUM BROMIDE AND ALBUTEROL SULFATE 3 ML: .5; 3 SOLUTION RESPIRATORY (INHALATION) at 14:05

## 2020-12-05 RX ADMIN — BUDESONIDE 500 MCG: 0.5 INHALANT RESPIRATORY (INHALATION) at 20:02

## 2020-12-05 RX ADMIN — CASTOR OIL AND BALSAM, PERU: 788; 87 OINTMENT TOPICAL at 17:41

## 2020-12-05 RX ADMIN — AMIODARONE HYDROCHLORIDE 200 MG: 200 TABLET ORAL at 09:33

## 2020-12-05 RX ADMIN — MICONAZOLE NITRATE: 20 CREAM TOPICAL at 09:00

## 2020-12-05 RX ADMIN — ASPIRIN 81 MG CHEWABLE TABLET 81 MG: 81 TABLET CHEWABLE at 09:33

## 2020-12-05 RX ADMIN — METOPROLOL TARTRATE 25 MG: 25 TABLET, FILM COATED ORAL at 09:33

## 2020-12-05 RX ADMIN — RIVAROXABAN 20 MG: 20 TABLET, FILM COATED ORAL at 17:40

## 2020-12-05 RX ADMIN — FUROSEMIDE 40 MG: 40 TABLET ORAL at 09:34

## 2020-12-05 RX ADMIN — Medication 1 AMPULE: at 21:28

## 2020-12-05 RX ADMIN — CASTOR OIL AND BALSAM, PERU: 788; 87 OINTMENT TOPICAL at 09:34

## 2020-12-05 RX ADMIN — ACETAMINOPHEN 650 MG: 325 TABLET ORAL at 03:52

## 2020-12-05 RX ADMIN — Medication 10 ML: at 06:00

## 2020-12-05 RX ADMIN — IPRATROPIUM BROMIDE AND ALBUTEROL SULFATE 3 ML: .5; 3 SOLUTION RESPIRATORY (INHALATION) at 07:30

## 2020-12-05 RX ADMIN — PREDNISONE 40 MG: 20 TABLET ORAL at 09:33

## 2020-12-05 RX ADMIN — Medication 10 ML: at 21:37

## 2020-12-05 RX ADMIN — Medication 1 AMPULE: at 09:00

## 2020-12-05 RX ADMIN — Medication 10 ML: at 14:00

## 2020-12-05 NOTE — PROGRESS NOTES
Problem: Breathing Pattern - Ineffective  Goal: *Absence of hypoxia  Outcome: Progressing Towards Goal  Goal: *Use of effective breathing techniques  Outcome: Progressing Towards Goal     Problem: Pressure Injury - Risk of  Goal: *Prevention of pressure injury  Description: Document Gaston Scale and appropriate interventions in the flowsheet. Outcome: Progressing Towards Goal  Note: Pressure Injury Interventions:  Sensory Interventions: Assess changes in LOC    Moisture Interventions: Maintain skin hydration (lotion/cream), Internal/External urinary devices    Activity Interventions: Increase time out of bed    Mobility Interventions: Assess need for specialty bed, Float heels, HOB 30 degrees or less, Pressure redistribution bed/mattress (bed type), PT/OT evaluation    Nutrition Interventions: Offer support with meals,snacks and hydration    Friction and Shear Interventions: Apply protective barrier, creams and emollients                Problem: Falls - Risk of  Goal: *Absence of Falls  Description: Document Anna Fall Risk and appropriate interventions in the flowsheet.   Outcome: Progressing Towards Goal  Note: Fall Risk Interventions:  Mobility Interventions: Bed/chair exit alarm         Medication Interventions: Bed/chair exit alarm    Elimination Interventions: Bed/chair exit alarm, Call light in reach    History of Falls Interventions: Bed/chair exit alarm

## 2020-12-05 NOTE — PROGRESS NOTES
Problem: Breathing Pattern - Ineffective  Goal: *Absence of hypoxia  Outcome: Progressing Towards Goal     Problem: Pressure Injury - Risk of  Goal: *Prevention of pressure injury  Description: Document Gaston Scale and appropriate interventions in the flowsheet. Outcome: Progressing Towards Goal  Note: Pressure Injury Interventions:  Sensory Interventions: Assess changes in LOC    Moisture Interventions: Maintain skin hydration (lotion/cream), Internal/External urinary devices    Activity Interventions: Increase time out of bed    Mobility Interventions: Assess need for specialty bed, Float heels, HOB 30 degrees or less, Pressure redistribution bed/mattress (bed type), PT/OT evaluation    Nutrition Interventions: Offer support with meals,snacks and hydration    Friction and Shear Interventions: Apply protective barrier, creams and emollients                Problem: Falls - Risk of  Goal: *Absence of Falls  Description: Document Anna Fall Risk and appropriate interventions in the flowsheet.   Outcome: Progressing Towards Goal  Note: Fall Risk Interventions:  Mobility Interventions: Bed/chair exit alarm         Medication Interventions: Bed/chair exit alarm    Elimination Interventions: Bed/chair exit alarm, Call light in reach    History of Falls Interventions: Bed/chair exit alarm

## 2020-12-05 NOTE — PROGRESS NOTES
Physical Therapy  Referral received and chart reviewed. Patient is bedbound at home with daughter providing care/assistance. Patient reports her wheelchair cannot fit into her room so she is unable to transfer out of bed. She reports declining rehab placement and home health therapy as it will not do any good until after she has need surgery. She states that her daughter can provide all the needed care at home and no additional services are needed. Acute therapy is not indicated due to baseline dependency. PT will sign off.    Hilton Ross, PT, DPT

## 2020-12-05 NOTE — PROGRESS NOTES
Comprehensive Nutrition Assessment    Type and Reason for Visit: Initial, Wound    Nutrition Recommendations/Plan:  Continue CCD  Add gelatein daily    Nutrition Assessment:     Patient medically noted for acute on chronic respiratory failure, acute encephalopathy, and COPD. PMH AFIB, CHF, and DM. Referral received for PI. Patient reports a good appetite and eating well. Reports issues with chewing certain meats due to not having teeth. Offered to adjust her diet but patient declined; she is utilizing menu to order foods per her preference/tolerance. Will add gelatein for extra 20g protein daily. Encouraged intake of meals. Estimated Daily Nutrient Needs:  Energy (kcal): 2035 (BMR 1904 x 1. 2AF -250 kcal);  Weight Used for Energy Requirements: Current  Protein (g): 110g (0.8 g/kg bw); Weight Used for Protein Requirements: Current  Fluid (ml/day): 1800 mL; Method Used for Fluid Requirements: 1 ml/kcal    Nutrition Related Findings:       2+ edema  12/1 BM  Medications: Atorvastatin, Lasix, Humalog, NPH, Prednisone     Wounds:    Stage II (per flowsheets) vs Deep tissue injury (per wound care note)     Current Nutrition Therapies:  DIET DIABETIC CONSISTENT CARB Regular    Anthropometric Measures:  · Height:  5' 3\" (160 cm)  · Current Body Wt:  136.9 kg (301 lb 13 oz)    · BMI Category:  Obese class 3 (BMI 40.0 or greater)       Nutrition Diagnosis:   · Increased nutrient needs related to (wound healing, body habitus) as evidenced by (PI, BMI >50)    Nutrition Interventions:   Food and/or Nutrient Delivery: Continue current diet, Start oral nutrition supplement  Nutrition Education and Counseling: No recommendations at this time  Coordination of Nutrition Care: No recommendation at this time    Goals:  PO intake >75% of mary/supplement next 5-7 days       Nutrition Monitoring and Evaluation:   Behavioral-Environmental Outcomes: None identified  Food/Nutrient Intake Outcomes: Food and nutrient intake, Supplement intake  Physical Signs/Symptoms Outcomes: Biochemical data, Weight, Skin, Chewing or swallowing    Discharge Planning:    Continue current diet     Electronically signed by Denisse Umana RD on 12/5/2020 at 11:25 AM    Contact: ext [phone]

## 2020-12-05 NOTE — PROGRESS NOTES
Hospitalist Progress Note    NAME: Rachel Hernandez   :  1960   MRN:  [id number]     Admit date: 2020    Today's date: 20    PCP: MOISE Hua M.D. Cell [phone]      Assessment / Plan:  Acute on chronic hypoxic and hypercapnic respiratory failure POA  Acute metabolic encephalopathy POA  Suspected COPD exacerbation POA  Probable JOSLYN/OHS POA  Remote tobacco use  - now on NC O2, not wearing BIPAP much  - ABG 4 liters 7.39, pCO2 88, pO2 68  -Not on CPAP at home, uses NC O2  - Recent COVID tests negative , repeat rapid rest negative  CTA chest:      1. No CT evidence for central pulmonary embolus at this time . 2. Stable patchy diffuse mosaic attenuation of lung parenchyma with bibasilar           atelectasis since 4 months ago, probably more suggestive of chronic small           airways lung disease, although atypical infection may present in a similar           fashion. 3. Borderline size of the main pulmonary outflow tract. Correlate for pulmonary              hypertension  - Scheduled and PRN nebs  - IV steroids, wean to PO  - Pulmonary consult  - PT consult as tolerated    Paroxysmal Atrial Fib POA  - Admit EKG with NSR  - Continue amiodarone and Xarelto  - Multiple prior cardioversion procedures  - Monitor on tele    DM type 2 with hyperglycemia on steroids POA   - Does not appear to be on medications, A1C 5.5  - SSI and BS checks as on IV steroids  - NPH with prednisone doses  - Diabetic diet    Morbid obesity POA Body mass index is 51.74 kg/m². Impaired mobility  - Reportedly mostly bedbound at home  - PT consult    Code Status: Full  Palliative care consult to discuss code status, goals of care    Surrogate Decision Maker: Michael Christianson, daughter    DVT Prophylaxis: Xarelto  GI Prophylaxis: not indicated    Baseline: Lives at home, mostly bedbound recently per chart notes    Body mass index is 51.74 kg/m². Subjective:     Chief Complaint / Reason for Physician Visit  \"my breathing is not good\". Discussed with RN events overnight. Seen in ICU, on 4 liters NC, off BIPAP  Mildly SOB    No CP, abdominal pain, N/V, diarrhea, loss of taste or smell, HA , sore throat    Review of Systems:  Symptom Y/N Comments  Symptom Y/N Comments   Fever/Chills n   Chest Pain n    Poor Appetite    Edema     Cough y   Abdominal Pain n    Sputum    Joint Pain     SOB/CHOPRA y   Headache     Nausea/vomit n   Tolerating PT/OT     Diarrhea n   Tolerating Diet y    Constipation    Other       Could NOT obtain due to:      Objective:     VITALS:   Last 24hrs VS reviewed since prior progress note.  Most recent are:  Patient Vitals for the past 24 hrs:   Temp Pulse Resp BP SpO2   12/04/20 1553 98.8 °F (37.1 °C) 78 29 (!) 129/41 94 %   12/04/20 1505     92 %   12/04/20 1500  76 24 (!) 117/48 91 %   12/04/20 1430  77 22 (!) 113/43 90 %   12/04/20 1400  77 27 (!) 116/40 (!) 88 %   12/04/20 1330  75 27 116/89 90 %   12/04/20 1314     90 %   12/04/20 1300  78 27 (!) 111/49 (!) 89 %   12/04/20 1230  78 25 (!) 118/46 90 %   12/04/20 1200 98.2 °F (36.8 °C) 77 27 (!) 116/43 92 %   12/04/20 1100  84 30 (!) 119/42 (!) 89 %   12/04/20 1030  87 30 (!) 123/42 (!) 89 %   12/04/20 1000  85 25 (!) 114/37 91 %   12/04/20 0930  82 (!) 33 (!) 123/42 (!) 89 %   12/04/20 0900  83 22 (!) 135/16 90 %   12/04/20 0830  73 28 (!) 111/43 94 %   12/04/20 0829     96 %   12/04/20 0800 97.9 °F (36.6 °C) 72 23 (!) 108/54 96 %   12/04/20 0745  71 29  97 %   12/04/20 0730   18 (!) 113/46 98 %   12/04/20 0715  72 30  96 %   12/04/20 0700  70 23 (!) 117/48 96 %   12/04/20 0600  71 25 (!) 113/47 98 %   12/04/20 0500  73 19 (!) 105/50 (!) 86 %   12/04/20 0400 97.9 °F (36.6 °C) 69 26 (!) 116/46 99 %   12/04/20 0330  72 (!) 31 (!) 116/48 96 %   12/04/20 0300  75 21 (!) 127/48 96 %   12/04/20 0200  78 26 (!) 135/99 99 %   12/04/20 0049     98 % 12/03/20 2330  75 24 (!) 152/68 99 %   12/03/20 2300  67 26 (!) 147/63 99 %   12/03/20 2230  65 28 (!) 143/60 99 %   12/03/20 2130  71 30 (!) 138/58 100 %   12/03/20 2000 98.4 °F (36.9 °C) 70 30 (!) 130/59 97 %   12/03/20 1916     97 %       Intake/Output Summary (Last 24 hours) at 12/4/2020 1909  Last data filed at 12/4/2020 1400  Gross per 24 hour   Intake 240 ml   Output 1200 ml   Net -960 ml        Wt Readings from Last 12 Encounters:   12/04/20 132.5 kg (292 lb 1.8 oz)   11/23/20 138.1 kg (304 lb 8 oz)   11/03/20 142 kg (313 lb)   09/15/20 140.2 kg (309 lb)   09/04/20 140.2 kg (309 lb)   09/01/20 140.2 kg (309 lb)   08/14/20 142 kg (313 lb)   08/07/20 149.1 kg (328 lb 12.8 oz)   12/27/19 136.1 kg (300 lb)   01/30/19 136.1 kg (300 lb)   11/13/15 134.7 kg (297 lb)   03/17/15 140.1 kg (308 lb 13.8 oz)       PHYSICAL EXAM:    I had a face to face encounter and independently examined this patient on 12/04/20 as outlined below:    General: WD, WN. Alert, cooperative, no acute distress    EENT:  PERRL. Anicteric sclerae. MMM  Resp:  Decreased BS bilaterally, no wheezing. No accessory muscle use  CV:  Regular  rhythm,  No edema  GI:  Soft, Non distended, Non tender. +Bowel sounds, no rebound  LN:  No cervical or inguinal MONICA  Neurologic:  Alert, normal speech, non focal motor exam  Psych:   Not anxious nor agitated  Skin:  No rashes. No jaundice    Reviewed most current lab test results and cultures  YES  Reviewed most current radiology test results   YES  Review and summation of old records today    NO  Reviewed patient's current orders and MAR    YES  PMH/SH reviewed - no change compared to H&P  ________________________________________________________________________  Care Plan discussed with:    Comments   Patient x    Family      RN x    Care Manager     Consultant                        Multidiciplinary team rounds were held today with , nursing, pharmacist and clinical coordinator. Patient's plan of care was discussed; medications were reviewed and discharge planning was addressed. ________________________________________________________________________      Comments   >50% of visit spent in counseling and coordination of care     ________________________________________________________________________  Adilson Padron MD     Procedures: see electronic medical records for all procedures/Xrays and details which were not copied into this note but were reviewed prior to creation of Plan. LABS:  I reviewed today's most current labs and imaging studies.   Pertinent labs include:  Recent Labs     12/03/20  0559 12/02/20  1634   WBC 9.1 12.2*   HGB 12.2 13.0   HCT 42.4 46.2   * 140*     Recent Labs     12/03/20  0559 12/02/20  1634    139   K 4.3 4.9   CL 93* 89*   CO2 >45* >45*   * 163*   BUN 24* 24*   CREA 0.48* 0.66   CA 8.8 9.2   ALB  --  2.8*   TBILI  --  0.8   ALT  --  61

## 2020-12-05 NOTE — PROGRESS NOTES
Hospitalist Progress Note    NAME: Siria Garcia   :  1960   MRN:  [id number]     Admit date: 2020    Today's date: 20    PCP: MOISE Aleman M.D. Cell [phone]      Assessment / Plan:  Acute on chronic hypoxic and hypercapnic respiratory failure POA  Acute metabolic encephalopathy POA  Suspected COPD exacerbation POA  Probable JOSLYN/OHS POA  Remote tobacco use  - now on 4 NC O2, did not wear BIPAP  - ABG 4 liters 7.39, pCO2 88, pO2 68  -Not on CPAP at home, uses NC O2  - Recent COVID tests negative , repeat rapid rest negative  CTA chest:      1. No CT evidence for central pulmonary embolus at this time . 2. Stable patchy diffuse mosaic attenuation of lung parenchyma with bibasilar           atelectasis since 4 months ago, probably more suggestive of chronic small           airways lung disease, although atypical infection may present in a similar           fashion. 3. Borderline size of the main pulmonary outflow tract. Correlate for pulmonary hypertension  - Scheduled and PRN nebs  - IV steroids, wean to PO  - Pulmonary consult  - Baseline non ambulatory due to osteoarthritis    Paroxysmal Atrial Fib POA  - Admit EKG with NSR  - Continue amiodarone and Xarelto  - Multiple prior cardioversion procedures    DM type 2 with hyperglycemia on steroids POA   - Does not appear to be on medications, A1C 5.5  - SSI and BS checks as on IV steroids  - NPH with prednisone doses  - Diabetic diet    Morbid obesity POA Body mass index is 53.46 kg/m². Impaired mobility  - Reportedly mostly bedbound at home  - PT consult    Code Status: Full  Palliative care consult to discuss code status, goals of care    Surrogate Decision Maker: Millicent Dumont, daughter    DVT Prophylaxis: Xarelto  GI Prophylaxis: not indicated    Baseline: Lives at home, mostly bedbound recently per chart notes    Body mass index is 53.46 kg/m². Subjective:     Chief Complaint / Reason for Physician Visit  \"my breathing is not good\". Discussed with RN events overnight. Seen in ICU, on 4 liters NC, off BIPAP  Mildly SOB  No CP, abdominal pain, N/V, diarrhea, loss of taste or smell, HA , sore throat    Review of Systems:  Symptom Y/N Comments  Symptom Y/N Comments   Fever/Chills n   Chest Pain n    Poor Appetite    Edema     Cough y   Abdominal Pain n    Sputum    Joint Pain     SOB/CHOPRA y   Headache     Nausea/vomit n   Tolerating PT/OT     Diarrhea n   Tolerating Diet y    Constipation    Other       Could NOT obtain due to:      Objective:     VITALS:   Last 24hrs VS reviewed since prior progress note.  Most recent are:  Patient Vitals for the past 24 hrs:   Temp Pulse Resp BP SpO2   12/05/20 1025 97.9 °F (36.6 °C) 86 24 (!) 112/52 93 %   12/05/20 0939 98.1 °F (36.7 °C) 87 22 (!) 123/55 92 %   12/05/20 0730     95 %   12/05/20 0307 98.2 °F (36.8 °C) 73 19 (!) 108/47 95 %   12/04/20 2333 98.4 °F (36.9 °C)  21 (!) 101/46 95 %   12/04/20 2059     97 %   12/04/20 1953 98.6 °F (37 °C) 76 20 (!) 134/58 98 %   12/04/20 1553 98.8 °F (37.1 °C) 78 29 (!) 129/41 94 %   12/04/20 1505     92 %   12/04/20 1500  76 24 (!) 117/48 91 %   12/04/20 1430  77 22 (!) 113/43 90 %   12/04/20 1400  77 27 (!) 116/40 (!) 88 %   12/04/20 1330  75 27 116/89 90 %   12/04/20 1314     90 %   12/04/20 1300  78 27 (!) 111/49 (!) 89 %   12/04/20 1230  78 25 (!) 118/46 90 %   12/04/20 1200 98.2 °F (36.8 °C) 77 27 (!) 116/43 92 %       Intake/Output Summary (Last 24 hours) at 12/5/2020 1157  Last data filed at 12/4/2020 2350  Gross per 24 hour   Intake 240 ml   Output 1400 ml   Net -1160 ml        Wt Readings from Last 12 Encounters:   12/05/20 136.9 kg (301 lb 13 oz)   11/23/20 138.1 kg (304 lb 8 oz)   11/03/20 142 kg (313 lb)   09/15/20 140.2 kg (309 lb)   09/04/20 140.2 kg (309 lb)   09/01/20 140.2 kg (309 lb)   08/14/20 142 kg (313 lb)   08/07/20 149.1 kg (328 lb 12.8 oz)   12/27/19 136.1 kg (300 lb)   01/30/19 136.1 kg (300 lb)   11/13/15 134.7 kg (297 lb)   03/17/15 140.1 kg (308 lb 13.8 oz)       PHYSICAL EXAM:    I had a face to face encounter and independently examined this patient on 12/05/20 as outlined below:    General: WD, WN. Alert, cooperative, no acute distress    EENT:  PERRL. Anicteric sclerae. MMM  Resp:  Decreased BS bilaterally, no wheezing. No accessory muscle use  CV:  Regular  rhythm,  No edema  GI:  Soft, Non distended, Non tender. +Bowel sounds, no rebound  Neurologic:  Alert, normal speech, non focal motor exam  Psych:   Not anxious nor agitated  Skin:  No rashes. No jaundice    Reviewed most current lab test results and cultures  YES  Reviewed most current radiology test results   YES  Review and summation of old records today    NO  Reviewed patient's current orders and MAR    YES  PMH/ reviewed - no change compared to H&P  ________________________________________________________________________  Care Plan discussed with:    Comments   Patient x    Family      RN x    Care Manager     Consultant                        Multidiciplinary team rounds were held today with , nursing, pharmacist and clinical coordinator. Patient's plan of care was discussed; medications were reviewed and discharge planning was addressed. ________________________________________________________________________      Comments   >50% of visit spent in counseling and coordination of care     ________________________________________________________________________  Everlyn Bring, MD     Procedures: see electronic medical records for all procedures/Xrays and details which were not copied into this note but were reviewed prior to creation of Plan. LABS:  I reviewed today's most current labs and imaging studies.   Pertinent labs include:  Recent Labs     12/05/20  0310 12/03/20  0559 12/02/20  1634   WBC 9.0 9.1 12.2*   HGB 11.0* 12.2 13.0   HCT 36.4 42.4 46.2   * 113* 140*     Recent Labs     12/05/20  0310 12/03/20  0559 12/02/20  1634    140 139   K 4.0 4.3 4.9   CL 94* 93* 89*   CO2 45* >45* >45*   * 140* 163*   BUN 30* 24* 24*   CREA 0.50* 0.48* 0.66   CA 8.8 8.8 9.2   ALB 2.7*  --  2.8*   TBILI 1.0  --  0.8   ALT 52  --  61

## 2020-12-05 NOTE — CONSULTS
· Afib refractory  · SVT s/p recent cardioversion by Dr. Negro Chavez.   · Acute on chronic hypoxemic (3-4 l at baseline), hypercapnic respiratory failure - better after treatment with bipap. Now seems to be back near baseline. · Rapid Covid test was negative again  · Encephalopathy (for several days prior to admission. Now better. · Suspected COPD exacerbation. History of tobacco abuse, possible COPD but unknown FEV1,   · Probable JOSLYN/OHV   · CHF exacerbation   · Afib -paroxysmal. Not anticoagulated to to medical compliance issues. · Morbid obesity   · Noncompliance   · Hyperglycemia due to steroid use. · Debility -severe DJD of knees. · Daughter: Bethany Casey: [address] 30; [phone]. · Full Code. Patient well-known to service form recent admit with complete consult  Seen as recently as 11/24 in follow-up before d/c    Asked to see for consult today- has been in hospital since readmitted since 12/2. Spoke w nurse/ reviewed chart no urgent issues. Ongoing issues with chronic respiratory failure-- may be Tr[facility] candidate- ? Would she use?     Full consult and follow-up to follow tomorrow

## 2020-12-06 LAB
GLUCOSE BLD STRIP.AUTO-MCNC: 119 MG/DL (ref 65–100)
GLUCOSE BLD STRIP.AUTO-MCNC: 145 MG/DL (ref 65–100)
GLUCOSE BLD STRIP.AUTO-MCNC: 178 MG/DL (ref 65–100)
GLUCOSE BLD STRIP.AUTO-MCNC: 185 MG/DL (ref 65–100)
SERVICE CMNT-IMP: ABNORMAL

## 2020-12-06 PROCEDURE — 77010033678 HC OXYGEN DAILY

## 2020-12-06 PROCEDURE — 65660000000 HC RM CCU STEPDOWN

## 2020-12-06 PROCEDURE — 74011250637 HC RX REV CODE- 250/637: Performed by: INTERNAL MEDICINE

## 2020-12-06 PROCEDURE — 82962 GLUCOSE BLOOD TEST: CPT

## 2020-12-06 PROCEDURE — 74011000250 HC RX REV CODE- 250: Performed by: HOSPITALIST

## 2020-12-06 PROCEDURE — 74011636637 HC RX REV CODE- 636/637: Performed by: INTERNAL MEDICINE

## 2020-12-06 PROCEDURE — 5A09457 ASSISTANCE WITH RESPIRATORY VENTILATION, 24-96 CONSECUTIVE HOURS, CONTINUOUS POSITIVE AIRWAY PRESSURE: ICD-10-PCS | Performed by: INTERNAL MEDICINE

## 2020-12-06 PROCEDURE — 74011000250 HC RX REV CODE- 250: Performed by: INTERNAL MEDICINE

## 2020-12-06 PROCEDURE — 74011250637 HC RX REV CODE- 250/637: Performed by: NURSE PRACTITIONER

## 2020-12-06 PROCEDURE — 94640 AIRWAY INHALATION TREATMENT: CPT

## 2020-12-06 PROCEDURE — 3331090002 HH PPS REVENUE DEBIT

## 2020-12-06 PROCEDURE — 74011250637 HC RX REV CODE- 250/637: Performed by: HOSPITALIST

## 2020-12-06 PROCEDURE — 3331090001 HH PPS REVENUE CREDIT

## 2020-12-06 RX ORDER — CALCIUM CARBONATE 200(500)MG
400 TABLET,CHEWABLE ORAL
Status: DISCONTINUED | OUTPATIENT
Start: 2020-12-06 | End: 2020-12-06

## 2020-12-06 RX ORDER — ARFORMOTEROL TARTRATE 15 UG/2ML
15 SOLUTION RESPIRATORY (INHALATION)
Status: DISCONTINUED | OUTPATIENT
Start: 2020-12-06 | End: 2020-12-08 | Stop reason: HOSPADM

## 2020-12-06 RX ADMIN — FUROSEMIDE 40 MG: 40 TABLET ORAL at 09:27

## 2020-12-06 RX ADMIN — Medication 1 AMPULE: at 09:29

## 2020-12-06 RX ADMIN — AZITHROMYCIN 500 MG: 250 TABLET, FILM COATED ORAL at 09:27

## 2020-12-06 RX ADMIN — METOPROLOL TARTRATE 25 MG: 25 TABLET, FILM COATED ORAL at 09:28

## 2020-12-06 RX ADMIN — METOPROLOL TARTRATE 25 MG: 25 TABLET, FILM COATED ORAL at 18:22

## 2020-12-06 RX ADMIN — RIVAROXABAN 20 MG: 20 TABLET, FILM COATED ORAL at 16:53

## 2020-12-06 RX ADMIN — BUDESONIDE 500 MCG: 0.5 INHALANT RESPIRATORY (INHALATION) at 07:39

## 2020-12-06 RX ADMIN — Medication 10 ML: at 20:34

## 2020-12-06 RX ADMIN — PREDNISONE 40 MG: 20 TABLET ORAL at 09:28

## 2020-12-06 RX ADMIN — IPRATROPIUM BROMIDE AND ALBUTEROL SULFATE 3 ML: .5; 3 SOLUTION RESPIRATORY (INHALATION) at 07:39

## 2020-12-06 RX ADMIN — MICONAZOLE NITRATE: 20 CREAM TOPICAL at 09:32

## 2020-12-06 RX ADMIN — ASPIRIN 81 MG CHEWABLE TABLET 81 MG: 81 TABLET CHEWABLE at 09:28

## 2020-12-06 RX ADMIN — IPRATROPIUM BROMIDE AND ALBUTEROL SULFATE 3 ML: .5; 3 SOLUTION RESPIRATORY (INHALATION) at 20:56

## 2020-12-06 RX ADMIN — ATORVASTATIN CALCIUM 10 MG: 10 TABLET, FILM COATED ORAL at 20:31

## 2020-12-06 RX ADMIN — Medication 1 AMPULE: at 20:35

## 2020-12-06 RX ADMIN — Medication 10 ML: at 16:53

## 2020-12-06 RX ADMIN — CASTOR OIL AND BALSAM, PERU: 788; 87 OINTMENT TOPICAL at 18:22

## 2020-12-06 RX ADMIN — HUMAN INSULIN 16 UNITS: 100 INJECTION, SUSPENSION SUBCUTANEOUS at 09:27

## 2020-12-06 RX ADMIN — MICONAZOLE NITRATE: 20 CREAM TOPICAL at 18:23

## 2020-12-06 RX ADMIN — INSULIN LISPRO 2 UNITS: 100 INJECTION, SOLUTION INTRAVENOUS; SUBCUTANEOUS at 16:53

## 2020-12-06 RX ADMIN — ARFORMOTEROL TARTRATE 15 MCG: 15 SOLUTION RESPIRATORY (INHALATION) at 21:02

## 2020-12-06 RX ADMIN — CASTOR OIL AND BALSAM, PERU: 788; 87 OINTMENT TOPICAL at 09:32

## 2020-12-06 RX ADMIN — BUDESONIDE 500 MCG: 0.5 INHALANT RESPIRATORY (INHALATION) at 21:02

## 2020-12-06 RX ADMIN — ARFORMOTEROL TARTRATE 15 MCG: 15 SOLUTION RESPIRATORY (INHALATION) at 08:54

## 2020-12-06 RX ADMIN — ALUMINUM HYDROXIDE AND MAGNESIUM HYDROXIDE 15 ML: 200; 200 SUSPENSION ORAL at 20:31

## 2020-12-06 RX ADMIN — AMIODARONE HYDROCHLORIDE 200 MG: 200 TABLET ORAL at 09:28

## 2020-12-06 RX ADMIN — IPRATROPIUM BROMIDE AND ALBUTEROL SULFATE 3 ML: .5; 3 SOLUTION RESPIRATORY (INHALATION) at 14:39

## 2020-12-06 NOTE — PROGRESS NOTES
Problem: Breathing Pattern - Ineffective  Goal: *Absence of hypoxia  Outcome: Progressing Towards Goal  Goal: *Use of effective breathing techniques  Outcome: Progressing Towards Goal     Problem: Pressure Injury - Risk of  Goal: *Prevention of pressure injury  Description: Document Gaston Scale and appropriate interventions in the flowsheet. Outcome: Progressing Towards Goal  Note: Pressure Injury Interventions:  Sensory Interventions: Assess changes in LOC, Float heels, Minimize linen layers    Moisture Interventions: Minimize layers, Maintain skin hydration (lotion/cream)    Activity Interventions: Pressure redistribution bed/mattress(bed type), Increase time out of bed    Mobility Interventions: Assess need for specialty bed, Float heels, HOB 30 degrees or less    Nutrition Interventions: Document food/fluid/supplement intake    Friction and Shear Interventions: Apply protective barrier, creams and emollients, Minimize layers, HOB 30 degrees or less                Problem: Falls - Risk of  Goal: *Absence of Falls  Description: Document Anna Fall Risk and appropriate interventions in the flowsheet.   Outcome: Progressing Towards Goal  Note: Fall Risk Interventions:  Mobility Interventions: Bed/chair exit alarm         Medication Interventions: Bed/chair exit alarm, Teach patient to arise slowly    Elimination Interventions: Bed/chair exit alarm, Call light in reach    History of Falls Interventions: Bed/chair exit alarm

## 2020-12-06 NOTE — PROGRESS NOTES
ADULT PROTOCOL: JET AEROSOL  REASSESSMENT    Patient  Pablito Rosales     63 y.o.   female     12/6/2020  4:10 AM    Breath Sounds Pre Procedure: Right Breath Sounds: Diminished                               Left Breath Sounds: Diminished    Breath Sounds Post Procedure: Right Breath Sounds: Diminished                                 Left Breath Sounds: Diminished    Breathing pattern: Pre procedure Breathing Pattern: Regular          Post procedure Breathing Pattern: Regular    Heart Rate: Pre procedure Pulse: 71           Post procedure Pulse: 75    Resp Rate: Pre procedure Respirations: 20           Post procedure Respirations: 20    Peak Flow: Pre bronchodilator             Post bronchodilator       FVC/FEV1:      Incentive Spirometry:             Cough: Pre procedure Cough: Non-productive               Post procedure Cough: Non-productive    Suctioned: NO    Sputum: Pre procedure                   Post procedure      Oxygen: O2 Device: (P) Nasal cannula   Flow rate (L/min) 4     Changed: NO    SpO2: Pre procedure SpO2: 94 %   with oxygen              Post procedure SpO2: 95 %  with oxygen    Nebulizer Therapy: Current medications Aerosolized Medications: DuoNeb, Pulmicort      Changed: NO    Smoking History: former smoker    Problem List:   Patient Active Problem List   Diagnosis Code    COVID-19 ruled out Z03.818    S/P cardiac cath Z98.890    Mixed hyperlipidemia E78.2    Morbid obesity ([facility]) E66.01    Acute respiratory failure (C[facility] Doran) J96.00    Atrial flutter with rapid ventricular response ([facility]) I48.92    JOSLYN (obstructive sleep apnea) G47.33    Acute on chronic respiratory failure with hypoxia and hypercapnia (HCC) J96.21, J96.22    SOB (shortness of breath) R06.02    Physical deconditioning R53.81    Counseling regarding advance care planning and goals of care Z71.89       Respiratory Therapist: Ann Carney RT

## 2020-12-06 NOTE — PROGRESS NOTES
Reason for Readmission:  Acute on Chronic Respiratory Failure w/ Hypoxia & Hypercapnia            RUR Score/Risk Level:  29 - High       PCP: First and Last name:  Berto Juares NP   Name of Practice:    Are you a current patient: Yes/No: Yes   Approximate date of last visit: 11/5/20? - next appointment scheduled 12/9/20 @ 09:30   Can you participate in a virtual visit with your PCP: No - only via telephone (verbal). Pt does not have cell phone, have been doing telephone visits with DTR Miriam Cox) present with her cell phone (not a smart device). Is a Care Conference indicated:  Not scheduled yet at this time. Palliative Care Consult placed to discuss goals of care. CM to participate as needed. Did you attend your follow up appointment (s): If not, why not: Did not attend 12/9/20 PCP f/u appointment due to hospital readmission. Pulmonologist had also been working on scheduling virtual appointment, but unable to see prior to pt's readmission. Resources/supports as identified by patient/family: Supportive DTR, granddaughter       Top Challenges facing patient (as identified by patient/family and CM): Finances/Medication cost?  Medic[place] A&[place] and [place]. Denies any financial concerns at this time     Transportation  Pt denies utilizing transportation at this time d/t inability to get out of bed or out of room. For emergent medical or discharge transportation she needs medical transport. Support system or lack thereof? Supportive family, however may need increased support at home due to level of care needed     Living arrangements? One-story house ([address]) with sister, DTR, granddaughter. Stays in bed. Bariatric w/c too wide for doorways, thus unable to leave room/bed. Self-care/ADLs/Cognition? Alert & oriented x 4. Reports able to feed self, but DTR provides assistance with all other ADLs/IADLs - changing diapers for B&B, bed baths, medications, turning her in bed. Current Advanced Directive/Advance Care Plan:  No current ACP on file at this time. Pt is unmarried with one adult child. DTR [facility] \"Edda\" Isaac Suarez, [phone]) is legal NOK. Pt declined to complete ACP at this time with CM. Plan for utilizing home health:  Previously open with [facility], but DTR reports that since last discharge home on 11/24/20 nobody has seen pt. Pt/DTR agreeable to [facility] SN services at discharge, but no PT/OT. CM sent referral to [facility] for SN, awaiting acceptance. Transition of Care Plan:    Based on readmission, the patient's previous Plan of Care   has been evaluated and/or modified. The current Transition of Care Plan is:           64 yo  female admitted on 12/2/20 for Acute on Chronic Respiratory Failure w/ Hypoxia & Hypercapnia. Pt is a readmission from 11/18/20 - 11/24/20 for SOB and AMS. Pt discharged home with f/u appointments via BLS transport. Lives in [place] [facility] ([address], 1 interior step) with sister ([place]), granddaughter Arminda Shah), and DTR/CG [facility] \"Edda\"). Reports she is bedbound and remains in bed at all times at home. Pt is able to feed herself, but DTR turns her regularly in bed, changes her diapers for B&B, provides bed baths, manages medications, cooks/delivers meals, manages medical appointments. Pt's bariatric w/c does not fit through doorway, thus unable to leave room without medical transport. Hx of [facility]), SNF ([facility]). Denied hx of acute inpatient rehab. Has RW, BSC, Cane, W/C, and 3LPM continuous home O2 through [employer] [place] [employer] [place] at home. Reported because she is unable to get OOB, she cannot attend any f/u appointments in-person. Additionally, pt does not have a cell phone nor does the DTR have a smart phone for virtual appointments. Any appointments have been done via verbal phone calls through DTR's phone. Preferred Rx is [facility] ([facility]ia [employer]). [place] and [place].      Pt provided verbal permission for CM to contact pt's DTR/CG Christel Pool, [phone]). DTR reported that pt does not have any HHA's through Medicaid and that they have not looked into this. CM advised for DTR to get into contact with [facility] to get 's information (does not recall name) about trying to initiate HHA assistance in the home to provide her respite/relief and more care for pt. CM also discussed LTC NH placement. Pt/DTR declining NH placement at this time, would like for pt to return home. CM consulted by Pulmonologist to submit order/referral for home Triology machine through [employer] submitted referral via [employer] to [address] [place] [address], awaiting response at this time. CM submitted referral to [facility] for [facility] SN, awaiting response at this time. RAMO:  1) [facility] SN (referral pending)  2) HHA (need to follow-up with [facility], possible [facility])  3) DME - [facility] (referral pending)  4) Follow-up Care Appointments - possible referral to [facility] or [facility]? 5) [facility] BLS Transport   6) 2nd IM Letter    Care Management Interventions  PCP Verified by CM: Yes  Palliative Care Criteria Met (RRAT>21 & CHF Dx)?: Yes  Palliative Consult Recommended?: Yes  Mode of Transport at Discharge: BLS  Transition of Care Consult (CM Consult): Discharge Planning  Discharge Durable Medical Equipment: No(3LPM continuous O2 Maydari Bañuelos, RW, BSC, Cane, Wheelchair at home; Order to be placed for [facility])  Health Maintenance Reviewed: Yes  Physical Therapy Consult: Yes  Occupational Therapy Consult: No  Speech Therapy Consult: No  Current Support Network:  Other, Lives with Caregiver(1-story house ([address], 1 interior step) with sister, DTR, granddaughter)  Confirm Follow Up Transport: Other (see comment)(Does not leave bed at home; would need medical transport)  The Plan for Transition of Care is Related to the Following Treatment Goals : HH SN vs. Note, Home Health Aides?, Follow-up Appointments, DME, 2nd IM Letter  The Patient and/or Patient Representative was Provided with a Choice of Provider and Agrees with the Discharge Plan?: Yes  Name of the Patient Representative Who was Provided with a Choice of Provider and Agrees with the Discharge Plan: Clara Fuentes (pt), See Gale (DTR)  Discharge Location  Discharge Placement: Home with home health(HH SN vs. None)    Deonte Luis 29 Manager  [phone]

## 2020-12-06 NOTE — PROGRESS NOTES
End of Shift Note    Bedside shift change report given to Mallorie Barbosa (oncoming nurse) by Valdo Chamorro RN (offgoing nurse). Report included the following information SBAR, Kardex, Intake/Output, MAR, Recent Results and Cardiac Rhythm NSR    Shift worked:  7p-7a     Shift summary and any significant changes:     No significant changes. Titrated oxygen down to 3L NC. Hygiene care given. Patient requested medication for indigestion. Dr. Esteban Smith, NP notified and orders received, however patient stated indigestion improved. Concerns for physician to address:  None     Zone phone for oncoming shift:   [phone]       Activity:  Activity Level: Bed Rest  Number times ambulated in hallways past shift: 0  Number of times OOB to chair past shift: 0    Cardiac:   Cardiac Monitoring: Yes      Cardiac Rhythm: Normal sinus rhythm    Access:   Current line(s): PIV     Genitourinary:   Urinary status: external catheter    Respiratory:   O2 Device: Nasal cannula  Chronic home O2 use?: NO  Incentive spirometer at bedside: NO     GI:  Last Bowel Movement Date: 12/01/20  Current diet:  DIET DIABETIC CONSISTENT CARB Regular  DIET NUTRITIONAL SUPPLEMENTS Dinner; Gelatein 20  Passing flatus: YES  Tolerating current diet: YES  % Diet Eaten: 100 %    Pain Management:   Patient states pain is manageable on current regimen: N/A    Skin:  Gaston Score: 16  Interventions: turn team and float heels    Patient Safety:  Fall Score:  Total Score: 2  Interventions: bed/chair alarm  High Fall Risk: Yes    Length of Stay:  Expected LOS: 3d 14h  Actual LOS: 4      Valdo Chamorro, RN

## 2020-12-06 NOTE — CONSULTS
[facility]  Pulmonary, Critical Care, and Sleep Medicine    Initial Patient Consult    Name: Barron Goff MRN: [id number]   : 1960 Hospital: [place]    Date: 2020        IMPRESSION:   · Acute/chronic hypoxic/hypercapnic respiratory failure  · Likely COPD (vs asthma) with exacerbation, no PFTs available  · Likely JOSLYN/OHS  · Afib, refractory  · DM  · Morbid obesity  · Non-ambulatory at baseline due to arthritis      RECOMMENDATIONS:   · O2  · Bronchodilators - she should be prescribed Advair 250/50 bid, Spiriva Handihaler 1 inhalation daily, and albuterol nebulizer qid PRN at the time of discharge - these are all covered by Medicaid and should be affordable to a patient on Medicaid  · Due to her recurrent admissions with severe chronic hypercapnic respiratory failure, treatment with a home noninvasive ventilator is indicated. CPAP or BiPAP will not be adequate to correct her severe baseline hypercapnia. Will try to get this arranged tomorrow. · We tried to arrange a virtual pulmonary office visit with Dr. Armani Lyn after her last hospitalization, but she was not at home long enough for this to occur. · Will try to get PFTs while she is here  · DVT prophylaxis     Subjective: This patient has been seen and evaluated at the request of Dr. Marianne Schilder for respiratory failure. Patient is a 63 y.o. female with chronic hypoxic/hypercapnic respiratory failure, suspected COPD with multiple recent admissions for encephalopathy. She has not been able to have outpatient evaluation due to immobility. She was discharged and supposed to have virtual visit to try to get her a home sleep study, but she returned to the hospital before that occurred. She was not prescribed bronchodilators as recommended at the time of discharge last visit. She is now awake and alert on 2 LPM O2. She has mild dyspnea at this time.      Past Medical History:   Diagnosis Date    Acute on chronic respiratory failure with hypoxia and hypercapnia ([facility]) 11/20/2020    Atrial fibrillation ([facility] 11/4/2020    CHF (congestive heart failure) ([facility])     Diabetes ([facility]     Hypertension     Ill-defined condition     high cholesterol    Ill-defined condition     tachycardia    JOSLYN (obstructive sleep apnea) 11/20/2020    Other and unspecified symptoms and signs involving general sensations and perceptions     ruptured disc    S/P cardiac cath 11/4/2020    11/3/2020 nonobstructive disease      Past Surgical History:   Procedure Laterality Date    VT COMPRE ELECTROPHYSIOL XM W/LEFT ATRIAL PACNG/REC N/A 11/23/2020    Lt Atrial Pace & Record During Ep Study performed by Zach Rosenbaum MD at [facility] CARD[facility]AC CATH [facility]    VT EPHYS EVAL W/ABLATION SUPRAVENT ARRHYTHMIA N/A 11/23/2020    ABLATION A-FLUTTER performed by Zach Rosenbaum MD at [facility] CARDIAC CATH LAB   [address] ADD ON N/A 11/23/2020    Ablation Svt/Vt Add On performed by Zach Rosenbaum MD at [facility] CARD[facility]AC CATH LAB    VT INTRACARDIAC ELECTROPHYSIOLOGIC 3D MAPPING N/A 11/23/2020    Ep 3d Mapping performed by Zach Rosenbaum MD at [facility] CARDIAC CATH LAB    STIM/PACING HEART POST IV DRUG INFU N/A 11/23/2020    Drug Stimulation performed by Zach Rosenbaum MD at [facility] CARDIAC CATH LAB      Prior to Admission medications    Medication Sig Start Date End Date Taking? Authorizing Provider   aspirin 81 mg chewable tablet Take 1 Tab by mouth daily. 11/5/20  Yes Shavonne Rodriguez MD   acetaminophen (TYLENOL) 325 mg tablet Take 650 mg by mouth every six (6) hours as needed for Pain. Yes Provider, Historical   amiodarone (CORDARONE) 200 mg tablet Take 1 Tab by mouth daily for 60 days. 11/25/20 1/24/21  Edelmira Clark MD   polyethylene glycol (MIRALAX) 17 gram packet Take 1 Packet by mouth daily for 30 days.  11/25/20 12/25/20  Edelmira Clark MD   predniSONE (DELTASONE) 10 mg tablet 4 tabs daily for 2 days; 3 tabs daily for 2 days; 2 tabs daily for 2 days ;1 tab   daily for 2 days 11/24/20   Nomi Lawrence MD   rivaroxaban (XARELTO) 20 mg tab tablet Take 1 Tab by mouth daily (with dinner) for 60 days. 11/24/20 1/23/21  Nomi Lawrence MD   menthol-zinc oxide (Moisture Barrier Ointment) 0.44-20.6 % oint Apply 1 Each to affected area three (3) times daily. thin layer moisture associated lesion    Provider, Historical   OXYGEN-AIR DELIVERY SYSTEMS 3 L/min by Nasal route continuous. 8/31/20   Tye Rodas NP   furosemide (LASIX) 40 mg tablet Take 1 Tab by mouth daily. 8/7/20   Brian Reese MD   atorvastatin (LIPITOR) 10 mg tablet Take 10 mg by mouth nightly. Russ Cano MD   metoprolol (LOPRESSOR) 25 mg tablet Take 25 mg by mouth two (2) times a day. Other, MD Russ     Allergies   Allergen Reactions    Cefazolin Rash    Other Plant, Animal, Environmental Rash     Ivory soap caused rash on hands. Social History     Tobacco Use    Smoking status: Former Smoker    Smokeless tobacco: Never Used   Substance Use Topics    Alcohol use: No      No family history on file.      Current Facility-Administered Medications   Medication Dose Route Frequency    balsam peru-castor oiL (VENELEX) ointment   Topical BID    miconazole (SECURA) 2 % extra thick cream   Topical BID    albuterol-ipratropium (DUO-NEB) 2.5 MG-0.5 MG/3 ML  3 mL Nebulization TID RT    predniSONE (DELTASONE) tablet 40 mg  40 mg Oral DAILY WITH BREAKFAST    insulin NPH (NOVOLIN N, HUMULIN N) injection 16 Units  16 Units SubCUTAneous DAILY WITH BREAKFAST    insulin lispro (HUMALOG) injection   SubCUTAneous AC&HS    alcohol 62% (NOZIN) nasal  1 Ampule  1 Ampule Topical Q12H    azithromycin (ZITHROMAX) tablet 500 mg  500 mg Oral DAILY    budesonide (PULMICORT) 500 mcg/2 ml nebulizer suspension  500 mcg Nebulization BID RT    sodium chloride (NS) flush 5-40 mL  5-40 mL IntraVENous Q8H    amiodarone (CORDARONE) tablet 200 mg  200 mg Oral DAILY    aspirin chewable tablet 81 mg  81 mg Oral DAILY    atorvastatin (LIPITOR) tablet 10 mg  10 mg Oral QHS    furosemide (LASIX) tablet 40 mg  40 mg Oral DAILY    rivaroxaban (XARELTO) tablet 20 mg  20 mg Oral DAILY WITH DINNER    metoprolol tartrate (LOPRESSOR) tablet 25 mg  25 mg Oral BID       Review of Systems:  A comprehensive review of systems was negative except for that written in the HPI. Objective:   Vital Signs:    Visit Vitals  BP (!) 114/53 (BP 1 Location: Left arm, BP Patient Position: At rest)   Pulse 73   Temp 98.1 °F (36.7 °C)   Resp 19   Ht 5' 3\" (1.6 m)   Wt 136.9 kg (301 lb 13 oz)   SpO2 95%   Breastfeeding No   BMI 53.46 kg/m²       O2 Device: Nasal cannula   O2 Flow Rate (L/min): 3 l/min   Temp (24hrs), Av.2 °F (36.8 °C), Min:97.6 °F (36.4 °C), Max:99 °F (37.2 °C)       Intake/Output:   Last shift:      No intake/output data recorded. Last 3 shifts:  1901 -  0700  In: -   Out: 3600 [Urine:3600]    Intake/Output Summary (Last 24 hours) at 2020 0810  Last data filed at 2020 0458  Gross per 24 hour   Intake    Output 2600 ml   Net -2600 ml      Physical Exam:   General:  Alert, cooperative, no distress, obese   Head:  Normocephalic, without obvious abnormality, atraumatic. Eyes:  Conjunctivae/corneas clear. Nose: Nares normal. Septum midline. Mucosa normal.     Throat: Lips, mucosa, and tongue normal. Teeth and gums normal.   Neck: Supple, symmetrical, trachea midline    Back:   Symmetric, no curvature. ROM normal.   Lungs:   Poor bilateral air movement   Chest wall:  No tenderness or deformity. Heart:  Regular rate and rhythm    Abdomen:   Soft, non-tender. Bowel sounds normal.     Extremities: Extremities normal, atraumatic, no cyanosis or edema.    Skin: Skin color, texture, turgor normal. No rashes or lesions   Lymph nodes: Cervical, supraclavicular  nodes normal.   Neurologic: Grossly nonfocal     Data review:     Recent Results (from the past 24 hour(s)) GLUCOSE, POC    Collection Time: 12/05/20 11:29 AM   Result Value Ref Range    Glucose (POC) 109 (H) 65 - 100 mg/dL    Performed by Augustus Nobles    GLUCOSE, POC    Collection Time: 12/05/20  5:01 PM   Result Value Ref Range    Glucose (POC) 189 (H) 65 - 100 mg/dL    Performed by Andre Gonzalez RN    GLUCOSE, POC    Collection Time: 12/05/20  8:38 PM   Result Value Ref Range    Glucose (POC) 161 (H) 65 - 100 mg/dL    Performed by Kelli Pate RN        Imaging:  I have personally reviewed the patients radiographs and have reviewed the reports:  Chronic mosaic attenuation of lungs, suggestive of air trapping        Carmine Stauffer MD

## 2020-12-06 NOTE — PROGRESS NOTES
Hospitalist Progress Note    NAME: Siria Garcia   :  1960   MRN:  [id number]     Admit date: 2020    Today's date: 20    PCP: Jimmie Dahl, MOISE Mclain M.D. Cell [phone]      Assessment / Plan:  Acute on chronic hypoxic and hypercapnic respiratory failure POA  Acute metabolic encephalopathy POA  Suspected COPD exacerbation POA  Probable JOSLYN/OHS POA  Remote tobacco use  - now on 4 NC O2, did not wear BIPAP  - ABG 4 liters 7.39, pCO2 88, pO2 68  -Not on CPAP at home, uses NC O2  - Recent COVID tests negative , repeat rapid rest negative  CTA chest:      1. No CT evidence for central pulmonary embolus at this time . 2. Stable patchy diffuse mosaic attenuation of lung parenchyma with bibasilar           atelectasis since 4 months ago, probably more suggestive of chronic small           airways lung disease, although atypical infection may present in a similar           fashion. 3. Borderline size of the main pulmonary outflow tract. Correlate for pulmonary hypertension  - Scheduled and PRN nebs  - IV steroids, wean to PO  - Pulmonary consult appreciated  - Baseline non ambulatory due to osteoarthritis    Paroxysmal Atrial Fib POA  - Admit EKG with NSR  - Continue amiodarone and Xarelto  - Multiple prior cardioversion procedures    DM type 2 with hyperglycemia on steroids POA   - Does not appear to be on medications, A1C 5.5  - SSI and BS checks as on IV steroids  - , 189, 161, 145, 119  - NPH with prednisone doses  - Diabetic diet    Morbid obesity POA Body mass index is 53.46 kg/m².   Impaired mobility  - Reportedly mostly bedbound at home  - PT consult    Code Status: Full  Palliative care consult to discuss code status, goals of care    Surrogate Decision Maker: Millicent Dumont, daughter    DVT Prophylaxis: Xarelto  GI Prophylaxis: not indicated    Baseline: Lives at home, mostly bedbound recently per chart notes    Body mass index is 53.46 kg/m². Subjective:     Chief Complaint / Reason for Physician Visit f/u COPD  \"I just feel tired\". Discussed with RN events overnight. Seen in ICU, on 4 liters NC, off BIPAP  No CP, abdominal pain, N/V, diarrhea, loss of taste or smell, HA , sore throat    Review of Systems:  Symptom Y/N Comments  Symptom Y/N Comments   Fever/Chills n   Chest Pain n    Poor Appetite    Edema     Cough y   Abdominal Pain n    Sputum    Joint Pain     SOB/CHOPRA y   Headache     Nausea/vomit n   Tolerating PT/OT     Diarrhea n   Tolerating Diet y    Constipation    Other       Could NOT obtain due to:      Objective:     VITALS:   Last 24hrs VS reviewed since prior progress note. Most recent are:  Patient Vitals for the past 24 hrs:   Temp Pulse Resp BP SpO2   12/06/20 1439     94 %   12/06/20 1131 97.9 °F (36.6 °C) 73 23 (!) 104/47 97 %   12/06/20 0930  78  (!) 115/49    12/06/20 0740     95 %   12/06/20 0730 98.7 °F (37.1 °C) 63 21 (!) 100/43 94 %   12/06/20 0458 98.1 °F (36.7 °C) 73 19 (!) 114/53 98 %   12/05/20 2340 97.6 °F (36.4 °C) 75 22 (!) 111/92 96 %   12/05/20 2004 97.8 °F (36.6 °C) 71 29 (!) 128/59 94 %   12/05/20 1739 98.9 °F (37.2 °C) 78 (!) 33 (!) 115/42 92 %       Intake/Output Summary (Last 24 hours) at 12/6/2020 1644  Last data filed at 12/6/2020 0458  Gross per 24 hour   Intake    Output 2600 ml   Net -2600 ml        Wt Readings from Last 12 Encounters:   12/05/20 136.9 kg (301 lb 13 oz)   11/23/20 138.1 kg (304 lb 8 oz)   11/03/20 142 kg (313 lb)   09/15/20 140.2 kg (309 lb)   09/04/20 140.2 kg (309 lb)   09/01/20 140.2 kg (309 lb)   08/14/20 142 kg (313 lb)   08/07/20 149.1 kg (328 lb 12.8 oz)   12/27/19 136.1 kg (300 lb)   01/30/19 136.1 kg (300 lb)   11/13/15 134.7 kg (297 lb)   03/17/15 140.1 kg (308 lb 13.8 oz)       PHYSICAL EXAM:    I had a face to face encounter and independently examined this patient on 12/06/20 as outlined below:    General: WD, WN. Alert, cooperative, no acute distress    EENT:  PERRL. Anicteric sclerae. MMM  Resp:  Decreased BS bilaterally, no wheezing. No accessory muscle use  CV:  Regular  rhythm,  No edema  GI:  Soft, Non distended, Non tender. +Bowel sounds, no rebound  Neurologic:  Alert, normal speech, non focal motor exam  Psych:   Not anxious nor agitated  Skin:  No rashes. No jaundice    Reviewed most current lab test results and cultures  YES  Reviewed most current radiology test results   YES  Review and summation of old records today    NO  Reviewed patient's current orders and MAR    YES  PMH/SH reviewed - no change compared to H&P  ________________________________________________________________________  Care Plan discussed with:    Comments   Patient x    Family      RN x    Care Manager     Consultant                        Multidiciplinary team rounds were held today with , nursing, pharmacist and clinical coordinator. Patient's plan of care was discussed; medications were reviewed and discharge planning was addressed. ________________________________________________________________________      Comments   >50% of visit spent in counseling and coordination of care     ________________________________________________________________________  Lacho Sifuentes MD     Procedures: see electronic medical records for all procedures/Xrays and details which were not copied into this note but were reviewed prior to creation of Plan. LABS:  I reviewed today's most current labs and imaging studies.   Pertinent labs include:  Recent Labs     12/05/20 0310   WBC 9.0   HGB 11.0*   HCT 36.4   *     Recent Labs     12/05/20  0310      K 4.0   CL 94*   CO2 45*   *   BUN 30*   CREA 0.50*   CA 8.8   ALB 2.7*   TBILI 1.0   ALT 52

## 2020-12-07 ENCOUNTER — HOME HEALTH ADMISSION (OUTPATIENT)
Dept: HOME HEALTH SERVICES | Facility: HOME HEALTH | Age: 60
End: 2020-12-07

## 2020-12-07 LAB
ARTERIAL PATENCY WRIST A: YES
BASE EXCESS BLD CALC-SCNC: 25 MMOL/L
BDY SITE: ABNORMAL
CA-I BLD-SCNC: 1.17 MMOL/L (ref 1.12–1.32)
GAS FLOW.O2 O2 DELIVERY SYS: ABNORMAL L/MIN
GAS FLOW.O2 SETTING OXYMISER: 4 L/M
GLUCOSE BLD STRIP.AUTO-MCNC: 142 MG/DL (ref 65–100)
GLUCOSE BLD STRIP.AUTO-MCNC: 164 MG/DL (ref 65–100)
GLUCOSE BLD STRIP.AUTO-MCNC: 181 MG/DL (ref 65–100)
GLUCOSE BLD STRIP.AUTO-MCNC: 87 MG/DL (ref 65–100)
HCO3 BLD-SCNC: 49.1 MMOL/L (ref 22–26)
PCO2 BLD: 76.4 MMHG (ref 35–45)
PH BLD: 7.42 [PH] (ref 7.35–7.45)
PO2 BLD: 74 MMHG (ref 80–100)
SAO2 % BLD: 94 % (ref 92–97)
SERVICE CMNT-IMP: ABNORMAL
SERVICE CMNT-IMP: NORMAL
SPECIMEN TYPE: ABNORMAL
TOTAL RESP. RATE, ITRR: 24

## 2020-12-07 PROCEDURE — 74011000250 HC RX REV CODE- 250: Performed by: INTERNAL MEDICINE

## 2020-12-07 PROCEDURE — 82803 BLOOD GASES ANY COMBINATION: CPT

## 2020-12-07 PROCEDURE — 74011250637 HC RX REV CODE- 250/637: Performed by: HOSPITALIST

## 2020-12-07 PROCEDURE — 3331090001 HH PPS REVENUE CREDIT

## 2020-12-07 PROCEDURE — 36600 WITHDRAWAL OF ARTERIAL BLOOD: CPT

## 2020-12-07 PROCEDURE — 3331090002 HH PPS REVENUE DEBIT

## 2020-12-07 PROCEDURE — 65660000000 HC RM CCU STEPDOWN

## 2020-12-07 PROCEDURE — 94010 BREATHING CAPACITY TEST: CPT

## 2020-12-07 PROCEDURE — 82962 GLUCOSE BLOOD TEST: CPT

## 2020-12-07 PROCEDURE — 74011250637 HC RX REV CODE- 250/637: Performed by: INTERNAL MEDICINE

## 2020-12-07 PROCEDURE — 2709999900 HC NON-CHARGEABLE SUPPLY

## 2020-12-07 PROCEDURE — 74011636637 HC RX REV CODE- 636/637: Performed by: INTERNAL MEDICINE

## 2020-12-07 PROCEDURE — 94640 AIRWAY INHALATION TREATMENT: CPT

## 2020-12-07 PROCEDURE — 74011000250 HC RX REV CODE- 250: Performed by: HOSPITALIST

## 2020-12-07 RX ORDER — PREDNISONE 20 MG/1
20 TABLET ORAL
Status: DISCONTINUED | OUTPATIENT
Start: 2020-12-08 | End: 2020-12-08 | Stop reason: HOSPADM

## 2020-12-07 RX ADMIN — PREDNISONE 40 MG: 20 TABLET ORAL at 08:47

## 2020-12-07 RX ADMIN — INSULIN LISPRO 2 UNITS: 100 INJECTION, SOLUTION INTRAVENOUS; SUBCUTANEOUS at 17:06

## 2020-12-07 RX ADMIN — ASPIRIN 81 MG CHEWABLE TABLET 81 MG: 81 TABLET CHEWABLE at 08:46

## 2020-12-07 RX ADMIN — Medication 1 AMPULE: at 21:41

## 2020-12-07 RX ADMIN — FUROSEMIDE 40 MG: 40 TABLET ORAL at 08:47

## 2020-12-07 RX ADMIN — MICONAZOLE NITRATE: 20 CREAM TOPICAL at 17:08

## 2020-12-07 RX ADMIN — IPRATROPIUM BROMIDE AND ALBUTEROL SULFATE 3 ML: .5; 3 SOLUTION RESPIRATORY (INHALATION) at 09:27

## 2020-12-07 RX ADMIN — Medication 10 ML: at 21:41

## 2020-12-07 RX ADMIN — ARFORMOTEROL TARTRATE 15 MCG: 15 SOLUTION RESPIRATORY (INHALATION) at 21:40

## 2020-12-07 RX ADMIN — BUDESONIDE 500 MCG: 0.5 INHALANT RESPIRATORY (INHALATION) at 09:27

## 2020-12-07 RX ADMIN — METOPROLOL TARTRATE 25 MG: 25 TABLET, FILM COATED ORAL at 17:08

## 2020-12-07 RX ADMIN — INSULIN LISPRO 2 UNITS: 100 INJECTION, SOLUTION INTRAVENOUS; SUBCUTANEOUS at 12:29

## 2020-12-07 RX ADMIN — RIVAROXABAN 20 MG: 20 TABLET, FILM COATED ORAL at 17:07

## 2020-12-07 RX ADMIN — ATORVASTATIN CALCIUM 10 MG: 10 TABLET, FILM COATED ORAL at 21:41

## 2020-12-07 RX ADMIN — BUDESONIDE 500 MCG: 0.5 INHALANT RESPIRATORY (INHALATION) at 21:40

## 2020-12-07 RX ADMIN — Medication 1 AMPULE: at 08:47

## 2020-12-07 RX ADMIN — MICONAZOLE NITRATE: 20 CREAM TOPICAL at 10:30

## 2020-12-07 RX ADMIN — CASTOR OIL AND BALSAM, PERU: 788; 87 OINTMENT TOPICAL at 08:48

## 2020-12-07 RX ADMIN — AZITHROMYCIN 500 MG: 250 TABLET, FILM COATED ORAL at 08:47

## 2020-12-07 RX ADMIN — ARFORMOTEROL TARTRATE 15 MCG: 15 SOLUTION RESPIRATORY (INHALATION) at 09:27

## 2020-12-07 RX ADMIN — HUMAN INSULIN 12 UNITS: 100 INJECTION, SUSPENSION SUBCUTANEOUS at 10:00

## 2020-12-07 RX ADMIN — IPRATROPIUM BROMIDE AND ALBUTEROL SULFATE 3 ML: .5; 3 SOLUTION RESPIRATORY (INHALATION) at 13:35

## 2020-12-07 RX ADMIN — CASTOR OIL AND BALSAM, PERU: 788; 87 OINTMENT TOPICAL at 17:09

## 2020-12-07 RX ADMIN — METOPROLOL TARTRATE 25 MG: 25 TABLET, FILM COATED ORAL at 08:46

## 2020-12-07 RX ADMIN — IPRATROPIUM BROMIDE AND ALBUTEROL SULFATE 3 ML: .5; 3 SOLUTION RESPIRATORY (INHALATION) at 21:31

## 2020-12-07 RX ADMIN — AMIODARONE HYDROCHLORIDE 200 MG: 200 TABLET ORAL at 08:47

## 2020-12-07 RX ADMIN — Medication 10 ML: at 13:22

## 2020-12-07 NOTE — PROGRESS NOTES
0700: Bedside shift change report given to Cornelious Spurling (oncoming nurse) by Anand Pat, RN (offgoing nurse). Report included the following information SBAR, Kardex, Intake/Output and MAR.     0700: Patient in bed, resting quietly. Call bell in reach, will monitor. 0374: Dr. Meg Toure at bedside assessing patient. Will do PFTs and get ABG. Otherwise continue current treatment. He decreased her nasal cannula to 2L. 0845: Patient's blood sugar was only 87 this am. She has 16 units of NPH scheduled. Holli Rashid to see if he still wanted me to give full dose of NPH. He d/c'd the 16 units and changed it to 12 units. 0915: Patient's O2 saturation 84-85%, nasal cannula increased to 3L.     1300: Patient O2 saturation 83-84% on 3L nasal cannula. Patient increased to 4L nasal cannula. O2 saturation 91-92%.     1900: End of Shift Note    Bedside shift change report given to [facility], RN (oncoming nurse) by [place] (offgoing nurse). Report included the following information SBAR, Kardex, Intake/Output and MAR    Shift worked:  [id number]     Shift summary and any significant changes:     Patient stable.       Concerns for physician to address:  Discharge plan     [facility]       Activity:  Activity Level: Bed Rest  Number times ambulated in hallways past shift: 0  Number of times OOB to chair past shift: 0    Cardiac:   Cardiac Monitoring: Yes      Cardiac Rhythm: Normal sinus rhythm    Access:   Current line(s): PIV     Genitourinary:   Urinary status: external catheter    Respiratory:   O2 Device: Nasal cannula  Chronic home O2 use?: Yes  Incentive spirometer at bedside: YES     GI:  Last Bowel Movement Date: 12/01/20  Current diet:  DIET DIABETIC CONSISTENT CARB Regular  DIET NUTRITIONAL SUPPLEMENTS Dinner; Gelatein 20  Passing flatus: YES  Tolerating current diet: YES  % Diet Eaten: 100 %    Pain Management:   Patient states pain is manageable on current regimen: YES    Skin:  Gaston Score: 15  Interventions: turn team, speciality bed, float heels, increase time out of bed and PT/OT consult    Patient Safety:  Fall Score:  Total Score: 2  Interventions: bed/chair alarm, assistive device (walker, cane, etc), pt to call before getting OOB, stay with me (per policy) and sitter at bedside   High Fall Risk: Yes    Length of Stay:  Expected LOS: 3d 14h  Actual LOS: [address]

## 2020-12-07 NOTE — PROGRESS NOTES
End of Shift Note    Bedside shift change report given to Kirstie Coronel (oncoming nurse) by Mesfin Glass RN (offgoing nurse). Report included the following information SBAR, Kardex, Intake/Output, MAR, Recent Results and Cardiac Rhythm NSR    Shift worked:  7p-7a     Shift summary and any significant changes:     No significant changes. Patient rested with BIPAP on from 5760-2210 with no challenges. Concerns for physician to address: Patient has not had BM since prior to admission. Zone phone for oncoming shift:   [phone]       Activity:  Activity Level: Bed Rest  Number times ambulated in hallways past shift: 0  Number of times OOB to chair past shift: 0    Cardiac:   Cardiac Monitoring: Yes      Cardiac Rhythm: Normal sinus rhythm    Access:   Current line(s): PIV     Genitourinary:   Urinary status: external catheter    Respiratory:   O2 Device: Nasal cannula  Chronic home O2 use?: YES  Incentive spirometer at bedside: YES     GI:  Last Bowel Movement Date: 12/01/20  Current diet:  DIET DIABETIC CONSISTENT CARB Regular  DIET NUTRITIONAL SUPPLEMENTS Dinner; Gelatein 20  Passing flatus: YES  Tolerating current diet: YES  % Diet Eaten: 100 %    Pain Management:   Patient states pain is manageable on current regimen: YES    Skin:  Gaston Score: 15  Interventions: turn team, float heels and internal/external urinary devices    Patient Safety:  Fall Score:  Total Score: 2  Interventions: bed/chair alarm  High Fall Risk: Yes    Length of Stay:  Expected LOS: 3d 14h  Actual LOS: 5      Mesfin Glass RN

## 2020-12-07 NOTE — PROGRESS NOTES
Problem: Pressure Injury - Risk of  Goal: *Prevention of pressure injury  Description: Document Gaston Scale and appropriate interventions in the flowsheet.   Outcome: Progressing Towards Goal  Note: Pressure Injury Interventions:  Sensory Interventions: Assess changes in LOC, Check visual cues for pain, Keep linens dry and wrinkle-free    Moisture Interventions: Maintain skin hydration (lotion/cream), Internal/External urinary devices    Activity Interventions: Increase time out of bed    Mobility Interventions: Float heels, HOB 30 degrees or less, PT/OT evaluation    Nutrition Interventions: Offer support with meals,snacks and hydration    Friction and Shear Interventions: Apply protective barrier, creams and emollients, Lift sheet

## 2020-12-07 NOTE — PROGRESS NOTES
[facility]  Pulmonary, Critical Care, and Sleep Medicine    Initial Patient Consult    Name: Mauricio Krishna MRN: [id number]   : 1960 Hospital: [facility]   Date: 2020        IMPRESSION:   · Acute/chronic hypoxic/hypercapnic respiratory failure J96.91 and J 96.92; has home O2  · Likely COPD (vs asthma) with exacerbation, no PFTs available  · (COPD OHS Thoracic Restrictive disease ) Restrictive disease from obesity J84.4 and E 66.8  · Afib, refractory  · DM  · Morbid obesity  · Non-ambulatory at baseline due to arthritis      RECOMMENDATIONS:   · Adjust O2 to keep sats 90-95%  · Nutritional counseling done  · Fall precautions  · ABG  · Bronchodilators - she should be prescribed Advair 250/50 bid, Spiriva Handihaler 1 inhalation daily, and albuterol nebulizer qid PRN at the time of discharge - these are all covered by Medicaid and should be affordable to a patient on Medicaid  · Due to her recurrent admissions with severe chronic hypercapnic respiratory failure, treatment with a home noninvasive ventilator is indicated. CPAP or BiPAP will not be adequate to correct her severe baseline hypercapnia. Case management consult placed   · We tried to arrange a virtual pulmonary office visit with Dr. Ruben Posadas after her last hospitalization, but she was not at home long enough for this to occur. · Will try to get PFTs while she is here  · DVT prophylaxis     2020: has two cats. lIves with daughter. Has O2 from last admission. Bad knees. Cannot walk    Subjective: This patient has been seen and evaluated at the request of Dr. Siobhan Jordan for respiratory failure. Patient is a 63 y.o. female with chronic hypoxic/hypercapnic respiratory failure, suspected COPD with multiple recent admissions for encephalopathy. She has not been able to have outpatient evaluation due to immobility.   She was discharged and supposed to have virtual visit to try to get her a home sleep study, but she returned to the hospital before that occurred. She was not prescribed bronchodilators as recommended at the time of discharge last visit. She is now awake and alert on 2 LPM O2. She has mild dyspnea at this time. Past Medical History:   Diagnosis Date    Acute on chronic respiratory failure with hypoxia and hypercapnia ([facility]) 11/20/2020    Atrial fibrillation [facility] 11/4/2020    CHF (congestive heart failure) ([facility])     Diabetes ([facility]     Hypertension     Ill-defined condition     high cholesterol    Ill-defined condition     tachycardia    JOSLYN (obstructive sleep apnea) 11/20/2020    Other and unspecified symptoms and signs involving general sensations and perceptions     ruptured disc    S/P cardiac cath 11/4/2020    11/3/2020 nonobstructive disease      Past Surgical History:   Procedure Laterality Date    SC COMPRE ELECTROPHYSIOL XM W/LEFT ATRIAL PACNG/REC N/A 11/23/2020    Lt Atrial Pace & Record During Ep Study performed by Kianna Quiroz MD at [facility] CARDIAC CATH LAB    SC EPHYS EVAL W/ABLATION SUPRAVENT ARRHYTHMIA N/A 11/23/2020    ABLATION A-FLUTTER performed by Kianna Quiroz MD at [facility] CARDIAC CATH LAB   [facility] ADD ON N/A 11/23/2020    Ablation Svt/Vt Add On performed by Kianna Quiroz MD at [facility] CARDIAC CATH LAB    SC INTRACARDIAC ELECTROPHYSIOLOGIC 3D MAPPING N/A 11/23/2020    Ep 3d Mapping performed by Kianna Quiroz MD at [facility] CARDIAC CATH LAB    STIM/PACING HEART POST IV DRUG INFU N/A 11/23/2020    Drug Stimulation performed by Kianna Quiroz MD at [facility] CARD[facility]AC CATH LAB      Prior to Admission medications    Medication Sig Start Date End Date Taking? Authorizing Provider   aspirin 81 mg chewable tablet Take 1 Tab by mouth daily. 11/5/20  Yes Hue Manjarrez MD   acetaminophen (TYLENOL) 325 mg tablet Take 650 mg by mouth every six (6) hours as needed for Pain.    Yes Provider, Historical   amiodarone (CORDARONE) 200 mg tablet Take 1 Tab by mouth daily for 60 days. 11/25/20 1/24/21  Mary Leyva MD   polyethylene glycol (MIRALAX) 17 gram packet Take 1 Packet by mouth daily for 30 days. 11/25/20 12/25/20  Mary Leyva MD   predniSONE (DELTASONE) 10 mg tablet 4 tabs daily for 2 days; 3 tabs daily for 2 days; 2 tabs daily for 2 days ;1 tab   daily for 2 days 11/24/20   Mary Leyva MD   rivaroxaban (XARELTO) 20 mg tab tablet Take 1 Tab by mouth daily (with dinner) for 60 days. 11/24/20 1/23/21  Mary Leyva MD   menthol-zinc oxide (Moisture Barrier Ointment) 0.44-20.6 % oint Apply 1 Each to affected area three (3) times daily. thin layer moisture associated lesion    Provider, Chun   OXYGEN-AIR DELIVERY SYSTEMS 3 L/min by Nasal route continuous. 8/31/20   Sheree Cantu NP   furosemide (LASIX) 40 mg tablet Take 1 Tab by mouth daily. 8/7/20   Alaan Wilder MD   atorvastatin (LIPITOR) 10 mg tablet Take 10 mg by mouth nightly. Russ Cano MD   metoprolol (LOPRESSOR) 25 mg tablet Take 25 mg by mouth two (2) times a day. Russ Cano MD     Allergies   Allergen Reactions    Cefazolin Rash    Other Plant, Animal, Environmental Rash     Ivory soap caused rash on hands. Social History     Tobacco Use    Smoking status: Former Smoker    Smokeless tobacco: Never Used   Substance Use Topics    Alcohol use: No      No family history on file.      Current Facility-Administered Medications   Medication Dose Route Frequency    arformoteroL (BROVANA) neb solution 15 mcg  15 mcg Nebulization BID RT    balsam peru-castor oiL (VENELEX) ointment   Topical BID    miconazole (SECURA) 2 % extra thick cream   Topical BID    albuterol-ipratropium (DUO-NEB) 2.5 MG-0.5 MG/3 ML  3 mL Nebulization TID RT    predniSONE (DELTASONE) tablet 40 mg  40 mg Oral DAILY WITH BREAKFAST    insulin NPH (NOVOLIN N, HUMULIN N) injection 16 Units  16 Units SubCUTAneous DAILY WITH BREAKFAST    insulin lispro (HUMALOG) injection SubCUTAneous AC&HS    alcohol 62% (NOZIN) nasal  1 Ampule  1 Ampule Topical Q12H    azithromycin (ZITHROMAX) tablet 500 mg  500 mg Oral DAILY    budesonide (PULMICORT) 500 mcg/2 ml nebulizer suspension  500 mcg Nebulization BID RT    sodium chloride (NS) flush 5-40 mL  5-40 mL IntraVENous Q8H    amiodarone (CORDARONE) tablet 200 mg  200 mg Oral DAILY    aspirin chewable tablet 81 mg  81 mg Oral DAILY    atorvastatin (LIPITOR) tablet 10 mg  10 mg Oral QHS    furosemide (LASIX) tablet 40 mg  40 mg Oral DAILY    rivaroxaban (XARELTO) tablet 20 mg  20 mg Oral DAILY WITH DINNER    metoprolol tartrate (LOPRESSOR) tablet 25 mg  25 mg Oral BID       Review of Systems:  A comprehensive review of systems was negative except for that written in the HPI. Objective:   Vital Signs:    Visit Vitals  BP (!) 121/58 (BP 1 Location: Left arm, BP Patient Position: At rest)   Pulse 75   Temp 97.9 °F (36.6 °C)   Resp 22   Ht 5' 3\" (1.6 m)   Wt 136 kg (299 lb 13.2 oz)   SpO2 90%   Breastfeeding No   BMI 53.11 kg/m²       O2 Device: Nasal cannula   O2 Flow Rate (L/min): 4 l/min   Temp (24hrs), Av.1 °F (36.7 °C), Min:97.8 °F (36.6 °C), Max:98.5 °F (36.9 °C)       Intake/Output:   Last shift:      No intake/output data recorded. Last 3 shifts:  1901 -  0700  In: -   Out: 3550 [Urine:3550]    Intake/Output Summary (Last 24 hours) at 2020 0803  Last data filed at 2020 0532  Gross per 24 hour   Intake    Output 1900 ml   Net -1900 ml      Physical Exam:   General:  Alert, cooperative, no distress, obese   Head:  Normocephalic, without obvious abnormality, atraumatic. Eyes:  Conjunctivae/corneas clear. Nose: Nares normal. Septum midline. Mucosa normal.     Throat: Lips, mucosa, and tongue normal. Teeth and gums normal.   Neck: Supple, symmetrical, trachea midline    Back:   Symmetric, no curvature. ROM normal.   Lungs:   Poor bilateral air movement   Chest wall:  No tenderness or deformity. Heart:  Regular rate and rhythm    Abdomen:   Soft, non-tender. Bowel sounds normal.     Extremities: Extremities normal, atraumatic, no cyanosis or edema. Skin: Skin color, texture, turgor normal. No rashes or lesions   Lymph nodes: Cervical, supraclavicular  nodes normal.   Neurologic: Grossly nonfocal     Data review:             Labs:    Recent Labs     12/05/20  0310   WBC 9.0   HGB 11.0*   *     Recent Labs     12/05/20  0310      K 4.0   CL 94*   CO2 45*   *   BUN 30*   CREA 0.50*   CA 8.8   ALB 2.7*   ALT 52     No results for input(s): PH, PCO2, PO2, HCO3, FIO2 in the last 72 hours. No results for input(s): CPK, CKNDX, TROIQ in the last 72 hours.     No lab exists for component: CPKMB  Lab Results   Component Value Date/Time    BNP 14 04/19/2009 05:00 PM            Imaging:  I have personally reviewed the patients radiographs and have reviewed the reports:  Chronic mosaic attenuation of lungs, suggestive of air trapping        Seamus Mckeon MD

## 2020-12-07 NOTE — PROGRESS NOTES
Afshan Plan:    D/C Home with [address]   F/U appointments referral to [facility] or [facility]  2nd IM Letter at d/c. [facility] transportation at d/c  DME- [employer]-in progress)      [facility]    CM sent referral for [facility] for f/u at discharge. CM faxed prescription for non invasive ventiltor- [facility] to  to complete. CM faxed requested progress notes to [address] [place] [address] ([phone]).     [address]  Phone: [phone]

## 2020-12-07 NOTE — PROGRESS NOTES
Music Therapy Assessment  [address] [place] [address] [id number]     1960  63 y.o.  female    Patient Telephone Number: [phone] (home)   Mu-ism Affiliation: Mushtaq Alan   Language: English   Patient Active Problem List    Diagnosis Date Noted    SOB (shortness of breath) 2020    Physical deconditioning 2020    Counseling regarding advance care planning and goals of care 2020    Atrial flutter with rapid ventricular response ([address] 2020    JOSLYN (obstructive sleep apnea) 2020    Acute on chronic respiratory failure with hypoxia and hypercapnia ([facility] 2020    Acute respiratory failure ([facility] 2020    Mixed hyperlipidemia 11/15/2020    Morbid obesity ([facility] 11/15/2020    S/P cardiac cath 2020    COVID-19 ruled out 2020        Date: 2020            Total Time (in minutes): 38          MRM 2 PROGRESSIVE CARE    Mental Status:   [x] Alert [  ] Karenann Amend [  ]  Confused  [  ] Minimally responsive  [  ] Sleeping    Communication Status: [  ] Impaired Speech [  ] Nonverbal -N/A    Physical Status:   [  ] Oxygen in use  [  ] Hard of Hearing [  ] Vision Impaired  [  ] Ambulatory  [  ] Ambulatory with assistance [  ] Non-ambulatory -N/A    Music Preferences, Background: Country and classic rock. Likes artists like Farhad Rivera, and Pushpa.      Clinical Problem addressed: alleviate pain and positive social interaction    Goal(s) met in session:  Physical/Pain management (Scale of 1-10):    Pre-session ratin  Post-session rating: Pt didn't report on pain   [  ] Increased relaxation   [  ] Affected breathing patterns  [  ] Decreased muscle tension   [  ] Decreased agitation  [  ] Affected heart rate    [  ] Increased alertness     Emotional/Psychological:  [x] Increased self-expression   [  ] Decreased aggressive behavior   [  ] Decreased feelings of stress  [  ] Discussed healthy coping skills     [  ] Improved mood    [  ] Decreased withdrawn behavior     Social:  [  ] Decreased feelings of isolation/loneliness [x] Positive social interaction   [  ] Provided support and/or comfort for family/friends    Spiritual:  [  ] Spiritual support    [  ] Expressed peace  [x] Expressed rosalina    [x] Discussed beliefs    Techniques Utilized (Check all that apply):   [  ] Procedural support MT [  ] Music for relaxation [x] Patient preferred music  [  ] Rema analysis  [x] Song choice  [  ] Music for validation  [  ] Entrainment  [x] Movement to music [  ] Guided visualization  [  ] Seferino Huffman  [  ] Patient instrument playing [  ] [employer] writing  [  ] Shanda Castillo along   [  ] Skip Race  [  ] Sensory stimulation  [x] Active Listening  [  ] Music for spiritual support [  ] Making of CDs as gifts    Session Observations:  Referred by Evette Mcmillan, Palliative NP. Patient (pt) was alert, lying in her bed. This music therapy intern (MTI) asked how the pt was feeling and what her favorite kinds of music were. Pt responded to these and she shared feeling discomfort around her hip. MTI shared more about music therapy and offered music. Pt agreed to this and MTI sang and played [employer]' [employer] with [employer]. Pt increased self-expression in response to the music as evidenced by (AEB) moving her feet to the beat and smiling. Pt shared about her health and multiple hospitalizations. She also mentioned her family and seeing the artist Ghulam Johnson live in concert. MTI provided active listening. MTI sang and played Micaela Dalton with [employer]. She expressed enjoyment in the music and shared her late-mother's name was Marcelo. 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 MTI sang and played [place] Take the Wheel with [employer]. MTI asked about the pt's rosalina and she shared this. Pt expressed gratitude for the visit.  MTI asked if the pt needed anything further before exiting. Pt expressed wanting her nurse to adjust her in bed. MTI consulted with the pt's nurse and shared about the pt's needs. Kailee García, Music Therapy Intern  Spiritual Care Services Dept.    Referral-based service

## 2020-12-07 NOTE — PROGRESS NOTES
Bedside and Verbal shift change report given to Alfredo Uribe RN (oncoming nurse) by [address] [place] (offgoing nurse). Report given with SBAR, Kardex, Intake/Output, MAR and Recent Results.

## 2020-12-07 NOTE — PROGRESS NOTES
Hospitalist Progress Note    NAME: Reny Perez   :  1960   MRN:  [id number]     Admit date: 2020    Today's date: 20    PCP: OMISE Pruitt M.D. Cell [phone]      Assessment / Plan:  Acute on chronic hypoxic and hypercapnic respiratory failure POA  Acute metabolic encephalopathy POA  Suspected COPD exacerbation POA  Probable JOSLYN and OHS POA  Remote tobacco use  - 3rd admit in past 5 weeks for respiratory failure  - baseline on 2 liters NC  - now on 4 NC O2, using BIPAP at night here  - Admit ABG on 3 L NC with 7.37, pCO2 > 90, pO2 43  - BIPAP and COPD treatments at admit, now using BIPAP at night  - Recent COVID tests negative , repeat rapid rest negative this admit  CTA chest:      1. No CT evidence for central pulmonary embolus at this time . 2. Stable patchy diffuse mosaic attenuation of lung parenchyma with bibasilar           atelectasis since 4 months ago, probably more suggestive of chronic small           airways lung disease, although atypical infection may present in a similar           fashion. 3. Borderline size of the main pulmonary outflow tract. Correlate for pulmonary hypertension  - Scheduled and PRN nebs  - IV steroids, wean to PO, stop soon  - Pulmonary consult appreciated, trying to set up trelegy machine to prevent readmits  - Baseline non ambulatory due to osteoarthritis  - Spoke with daughter by phone    Paroxysmal Atrial Fib POA  - Admit EKG with NSR  - Continue amiodarone and Xarelto  - Multiple prior cardioversion procedures    DM type 2 with hyperglycemia on steroids POA   - Does not appear to be on medications, A1C 5.5  - SSI and BS checks as on IV steroids  - , 87, 142, 181  - NPH with prednisone doses, reduced a bit today  - Diabetic diet    Morbid obesity POA Body mass index is 53.11 kg/m².   Impaired mobility  - Reportedly mostly bedbound at home  - PT consult    Code Status: Full  Palliative care consult to discuss code status, goals of care    Surrogate Decision Maker: Sanaz Medrano, daughter    DVT Prophylaxis: Xarelto  GI Prophylaxis: not indicated    Baseline: Lives at home, mostly bedbound recently per chart notes    Body mass index is 53.11 kg/m². Subjective:     Chief Complaint / Reason for Physician Visit f/u COPD  \"Ibreathing is okay\". Discussed with RN events overnight. On stepdown, wearing BIPAP at night, currently on 4 liters NC  Pulmonary trying to det up Trelegy for home  No CP, abdominal pain, N/V, diarrhea, loss of taste or smell, HA , sore throat    Review of Systems:  Symptom Y/N Comments  Symptom Y/N Comments   Fever/Chills n   Chest Pain n    Poor Appetite    Edema     Cough y   Abdominal Pain n    Sputum    Joint Pain     SOB/CHOPRA y   Headache     Nausea/vomit n   Tolerating PT/OT     Diarrhea n   Tolerating Diet y    Constipation    Other       Could NOT obtain due to:      Objective:     VITALS:   Last 24hrs VS reviewed since prior progress note.  Most recent are:  Patient Vitals for the past 24 hrs:   Temp Pulse Resp BP SpO2   12/07/20 1512 98.2 °F (36.8 °C) 79 26 130/60 95 %   12/07/20 1336     92 %   12/07/20 1021 98.6 °F (37 °C) 79 19 (!) 105/52 90 %   12/07/20 0929     92 %   12/07/20 0746 97.9 °F (36.6 °C) 75 22 (!) 121/58 90 %   12/07/20 0532 97.8 °F (36.6 °C) 69 24 (!) 100/51 95 %   12/07/20 0319     97 %   12/07/20 0012 97.8 °F (36.6 °C) 70 22 (!) 104/49 92 %   12/06/20 2258     97 %   12/06/20 2056     96 %   12/06/20 1954 98.5 °F (36.9 °C) 75 23 (!) 115/50 98 %       Intake/Output Summary (Last 24 hours) at 12/7/2020 1815  Last data filed at 12/7/2020 1736  Gross per 24 hour   Intake 720 ml   Output 2400 ml   Net -1680 ml        Wt Readings from Last 12 Encounters:   12/07/20 136 kg (299 lb 13.2 oz)   11/23/20 138.1 kg (304 lb 8 oz)   11/03/20 142 kg (313 lb)   09/15/20 140.2 kg (309 lb)   09/04/20 140.2 kg (309 lb) 09/01/20 140.2 kg (309 lb)   08/14/20 142 kg (313 lb)   08/07/20 149.1 kg (328 lb 12.8 oz)   12/27/19 136.1 kg (300 lb)   01/30/19 136.1 kg (300 lb)   11/13/15 134.7 kg (297 lb)   03/17/15 140.1 kg (308 lb 13.8 oz)       PHYSICAL EXAM:    I had a face to face encounter and independently examined this patient on 12/07/20 as outlined below:    General: WD, WN. Alert, cooperative, no acute distress    EENT:  PERRL. Anicteric sclerae. MMM  Resp:  Decreased BS bilaterally, crackles at the bases, no wheezing. No accessory muscle use  CV:  Regular  rhythm,  No edema  GI:  Soft, Non distended, Non tender. +Bowel sounds, no rebound  Neurologic:  Alert, normal speech, non focal motor exam  Psych:   Not anxious nor agitated  Skin:  No rashes. No jaundice    Reviewed most current lab test results and cultures  YES  Reviewed most current radiology test results   YES  Review and summation of old records today    NO  Reviewed patient's current orders and MAR    YES  PMH/ reviewed - no change compared to H&P  ________________________________________________________________________  Care Plan discussed with:    Comments   Patient x    Family      RN x    Care Manager     Consultant                        Multidiciplinary team rounds were held today with , nursing, pharmacist and clinical coordinator. Patient's plan of care was discussed; medications were reviewed and discharge planning was addressed. ________________________________________________________________________      Comments   >50% of visit spent in counseling and coordination of care     ________________________________________________________________________  Tasia Real MD     Procedures: see electronic medical records for all procedures/Xrays and details which were not copied into this note but were reviewed prior to creation of Plan. LABS:  I reviewed today's most current labs and imaging studies.   Pertinent labs include:  Recent Labs     12/05/20  0310   WBC 9.0   HGB 11.0*   HCT 36.4   *     Recent Labs     12/05/20 0310      K 4.0   CL 94*   CO2 45*   *   BUN 30*   CREA 0.50*   CA 8.8   ALB 2.7*   TBILI 1.0   ALT 52

## 2020-12-07 NOTE — PROGRESS NOTES
Problem: Breathing Pattern - Ineffective  Goal: *Absence of hypoxia  Outcome: Progressing Towards Goal  Goal: *Use of effective breathing techniques  Outcome: Progressing Towards Goal     Problem: Pressure Injury - Risk of  Goal: *Prevention of pressure injury  Description: Document Gaston Scale and appropriate interventions in the flowsheet. Outcome: Progressing Towards Goal  Note: Pressure Injury Interventions:  Sensory Interventions: Assess changes in LOC, Check visual cues for pain, Keep linens dry and wrinkle-free    Moisture Interventions: Maintain skin hydration (lotion/cream), Internal/External urinary devices    Activity Interventions: Increase time out of bed    Mobility Interventions: Float heels, HOB 30 degrees or less, PT/OT evaluation    Nutrition Interventions: Offer support with meals,snacks and hydration    Friction and Shear Interventions: Apply protective barrier, creams and emollients, Lift sheet                Problem: Falls - Risk of  Goal: *Absence of Falls  Description: Document Anna Fall Risk and appropriate interventions in the flowsheet. Outcome: Progressing Towards Goal  Note: Fall Risk Interventions:  Mobility Interventions: Bed/chair exit alarm         Medication Interventions: Bed/chair exit alarm    Elimination Interventions:  Toileting schedule/hourly rounds    History of Falls Interventions: Bed/chair exit alarm         Problem: Chronic Obstructive Pulmonary Disease (COPD)  Goal: *Oxygen saturation during activity within specified parameters  Outcome: Progressing Towards Goal  Goal: *Able to remain out of bed as prescribed  Outcome: Progressing Towards Goal  Goal: *Absence of hypoxia  Outcome: Progressing Towards Goal  Goal: *Optimize nutritional status  Outcome: Progressing Towards Goal

## 2020-12-07 NOTE — PROGRESS NOTES
ADULT PROTOCOL: JET AEROSOL  REASSESSMENT    Patient  Valentino Quail     63 y.o.   female     12/7/2020  5:37 PM    Breath Sounds Pre Procedure: Right Breath Sounds: Diminished                               Left Breath Sounds: Diminished    Breath Sounds Post Procedure: Right Breath Sounds: Diminished                                 Left Breath Sounds: Diminished    Breathing pattern: Pre procedure Breathing Pattern: Regular          Post procedure Breathing Pattern: Regular    Heart Rate: Pre procedure Pulse: 72           Post procedure Pulse: 75    Resp Rate: Pre procedure Respirations: 18           Post procedure Respirations: 20    Oxygen: O2 Device: Nasal cannula   4L    SpO2: Pre procedure SpO2: 92 %                 Post procedure SpO2: 92 %      Nebulizer Therapy: Current medications Aerosolized Medications: DuoNeb       Problem List:   Patient Active Problem List   Diagnosis Code    COVID-19 ruled out Z03.818    S/P cardiac cath Z98.890    Mixed hyperlipidemia E78.2    Morbid obesity ([address] E66.01    Acute respiratory failure ([address] J96.00    Atrial flutter with rapid ventricular response ([address] I48.92    JOSLYN (obstructive sleep apnea) G47.33    Acute on chronic respiratory failure with hypoxia and hypercapnia (HCC) J96.21, J96.22    SOB (shortness of breath) R06.02    Physical deconditioning R53.81    Counseling regarding advance care planning and goals of care Z71.89       Respiratory Therapist: Junior Dunham

## 2020-12-07 NOTE — INTERDISCIPLINARY ROUNDS
Interdisciplinary team rounds were held 12/7/2020 with the following team members:Care Management, Nursing, Nutrition, Pharmacy, Physician and Clinical Coordinator and the patient. Plan of care discussed. See clinical pathway and/or care plan for interventions and desired outcomes. Patient on baseline O2 of 3L and continues to require BiPAP at HS. Palliative following.

## 2020-12-08 ENCOUNTER — TELEPHONE (OUTPATIENT)
Dept: FAMILY MEDICINE CLINIC | Age: 60
End: 2020-12-08

## 2020-12-08 VITALS
HEIGHT: 63 IN | HEART RATE: 71 BPM | WEIGHT: 293 LBS | BODY MASS INDEX: 51.91 KG/M2 | DIASTOLIC BLOOD PRESSURE: 45 MMHG | RESPIRATION RATE: 27 BRPM | SYSTOLIC BLOOD PRESSURE: 112 MMHG | OXYGEN SATURATION: 96 % | TEMPERATURE: 98 F

## 2020-12-08 LAB
ALBUMIN SERPL-MCNC: 2.7 G/DL (ref 3.5–5)
ALBUMIN/GLOB SERPL: 0.8 {RATIO} (ref 1.1–2.2)
ALP SERPL-CCNC: 95 U/L (ref 45–117)
ALT SERPL-CCNC: 68 U/L (ref 12–78)
ANION GAP SERPL CALC-SCNC: ABNORMAL MMOL/L (ref 5–15)
AST SERPL-CCNC: 71 U/L (ref 15–37)
BASOPHILS # BLD: 0 K/UL (ref 0–0.1)
BASOPHILS NFR BLD: 0 % (ref 0–1)
BILIRUB SERPL-MCNC: 0.7 MG/DL (ref 0.2–1)
BUN SERPL-MCNC: 30 MG/DL (ref 6–20)
BUN/CREAT SERPL: 56 (ref 12–20)
CALCIUM SERPL-MCNC: 9 MG/DL (ref 8.5–10.1)
CHLORIDE SERPL-SCNC: 95 MMOL/L (ref 97–108)
CO2 SERPL-SCNC: >45 MMOL/L (ref 21–32)
CREAT SERPL-MCNC: 0.54 MG/DL (ref 0.55–1.02)
DIFFERENTIAL METHOD BLD: ABNORMAL
EOSINOPHIL # BLD: 0.1 K/UL (ref 0–0.4)
EOSINOPHIL NFR BLD: 1 % (ref 0–7)
ERYTHROCYTE [DISTWIDTH] IN BLOOD BY AUTOMATED COUNT: 13.6 % (ref 11.5–14.5)
GLOBULIN SER CALC-MCNC: 3.5 G/DL (ref 2–4)
GLUCOSE BLD STRIP.AUTO-MCNC: 120 MG/DL (ref 65–100)
GLUCOSE BLD STRIP.AUTO-MCNC: 81 MG/DL (ref 65–100)
GLUCOSE SERPL-MCNC: 78 MG/DL (ref 65–100)
HCT VFR BLD AUTO: 38.9 % (ref 35–47)
HGB BLD-MCNC: 11.2 G/DL (ref 11.5–16)
IMM GRANULOCYTES # BLD AUTO: 0.1 K/UL (ref 0–0.04)
IMM GRANULOCYTES NFR BLD AUTO: 1 % (ref 0–0.5)
LYMPHOCYTES # BLD: 1.1 K/UL (ref 0.8–3.5)
LYMPHOCYTES NFR BLD: 15 % (ref 12–49)
MCH RBC QN AUTO: 30.7 PG (ref 26–34)
MCHC RBC AUTO-ENTMCNC: 28.8 G/DL (ref 30–36.5)
MCV RBC AUTO: 106.6 FL (ref 80–99)
MONOCYTES # BLD: 0.4 K/UL (ref 0–1)
MONOCYTES NFR BLD: 5 % (ref 5–13)
NEUTS SEG # BLD: 5.9 K/UL (ref 1.8–8)
NEUTS SEG NFR BLD: 78 % (ref 32–75)
NRBC # BLD: 0 K/UL (ref 0–0.01)
NRBC BLD-RTO: 0 PER 100 WBC
PLATELET # BLD AUTO: 84 K/UL (ref 150–400)
PMV BLD AUTO: 12.4 FL (ref 8.9–12.9)
POTASSIUM SERPL-SCNC: 4.1 MMOL/L (ref 3.5–5.1)
PROT SERPL-MCNC: 6.2 G/DL (ref 6.4–8.2)
RBC # BLD AUTO: 3.65 M/UL (ref 3.8–5.2)
RBC MORPH BLD: ABNORMAL
RBC MORPH BLD: ABNORMAL
SERVICE CMNT-IMP: ABNORMAL
SERVICE CMNT-IMP: NORMAL
SODIUM SERPL-SCNC: 140 MMOL/L (ref 136–145)
WBC # BLD AUTO: 7.6 K/UL (ref 3.6–11)

## 2020-12-08 PROCEDURE — 36415 COLL VENOUS BLD VENIPUNCTURE: CPT

## 2020-12-08 PROCEDURE — 74011000250 HC RX REV CODE- 250: Performed by: HOSPITALIST

## 2020-12-08 PROCEDURE — 94640 AIRWAY INHALATION TREATMENT: CPT

## 2020-12-08 PROCEDURE — 82962 GLUCOSE BLOOD TEST: CPT

## 2020-12-08 PROCEDURE — 74011250637 HC RX REV CODE- 250/637: Performed by: INTERNAL MEDICINE

## 2020-12-08 PROCEDURE — 74011000250 HC RX REV CODE- 250: Performed by: INTERNAL MEDICINE

## 2020-12-08 PROCEDURE — 97165 OT EVAL LOW COMPLEX 30 MIN: CPT

## 2020-12-08 PROCEDURE — 94660 CPAP INITIATION&MGMT: CPT

## 2020-12-08 PROCEDURE — 77010033678 HC OXYGEN DAILY

## 2020-12-08 PROCEDURE — 97110 THERAPEUTIC EXERCISES: CPT

## 2020-12-08 PROCEDURE — 3331090001 HH PPS REVENUE CREDIT

## 2020-12-08 PROCEDURE — 80053 COMPREHEN METABOLIC PANEL: CPT

## 2020-12-08 PROCEDURE — 74011636637 HC RX REV CODE- 636/637: Performed by: INTERNAL MEDICINE

## 2020-12-08 PROCEDURE — 85025 COMPLETE CBC W/AUTO DIFF WBC: CPT

## 2020-12-08 PROCEDURE — 3331090002 HH PPS REVENUE DEBIT

## 2020-12-08 RX ORDER — ACETAZOLAMIDE 250 MG/1
500 TABLET ORAL ONCE
Status: COMPLETED | OUTPATIENT
Start: 2020-12-08 | End: 2020-12-08

## 2020-12-08 RX ORDER — PREDNISONE 10 MG/1
10 TABLET ORAL
Qty: 2 TAB | Refills: 0 | Status: SHIPPED | OUTPATIENT
Start: 2020-12-09 | End: 2022-04-21 | Stop reason: ALTCHOICE

## 2020-12-08 RX ORDER — NEBULIZER AND COMPRESSOR
EACH MISCELLANEOUS
Qty: 1 EACH | Refills: 0 | Status: ON HOLD | OUTPATIENT
Start: 2020-12-08 | End: 2022-04-21

## 2020-12-08 RX ORDER — FLUTICASONE PROPIONATE AND SALMETEROL 250; 50 UG/1; UG/1
1 POWDER RESPIRATORY (INHALATION) EVERY 12 HOURS
Qty: 1 EACH | Refills: 0 | Status: ON HOLD | OUTPATIENT
Start: 2020-12-08 | End: 2022-04-21

## 2020-12-08 RX ORDER — IPRATROPIUM BROMIDE AND ALBUTEROL SULFATE 2.5; .5 MG/3ML; MG/3ML
3 SOLUTION RESPIRATORY (INHALATION)
Qty: 100 NEBULE | Refills: 0 | Status: SHIPPED | OUTPATIENT
Start: 2020-12-08

## 2020-12-08 RX ADMIN — IPRATROPIUM BROMIDE AND ALBUTEROL SULFATE 3 ML: .5; 3 SOLUTION RESPIRATORY (INHALATION) at 13:41

## 2020-12-08 RX ADMIN — PREDNISONE 20 MG: 20 TABLET ORAL at 08:31

## 2020-12-08 RX ADMIN — AMIODARONE HYDROCHLORIDE 200 MG: 200 TABLET ORAL at 08:31

## 2020-12-08 RX ADMIN — Medication 10 ML: at 06:30

## 2020-12-08 RX ADMIN — Medication 10 ML: at 13:28

## 2020-12-08 RX ADMIN — ASPIRIN 81 MG CHEWABLE TABLET 81 MG: 81 TABLET CHEWABLE at 08:31

## 2020-12-08 RX ADMIN — ACETAZOLAMIDE 500 MG: 250 TABLET ORAL at 08:31

## 2020-12-08 RX ADMIN — CASTOR OIL AND BALSAM, PERU: 788; 87 OINTMENT TOPICAL at 08:33

## 2020-12-08 RX ADMIN — IPRATROPIUM BROMIDE AND ALBUTEROL SULFATE 3 ML: .5; 3 SOLUTION RESPIRATORY (INHALATION) at 07:50

## 2020-12-08 RX ADMIN — BUDESONIDE 500 MCG: 0.5 INHALANT RESPIRATORY (INHALATION) at 07:50

## 2020-12-08 RX ADMIN — Medication 1 AMPULE: at 08:32

## 2020-12-08 RX ADMIN — METOPROLOL TARTRATE 25 MG: 25 TABLET, FILM COATED ORAL at 08:31

## 2020-12-08 RX ADMIN — HUMAN INSULIN 10 UNITS: 100 INJECTION, SUSPENSION SUBCUTANEOUS at 08:31

## 2020-12-08 RX ADMIN — MICONAZOLE NITRATE: 20 CREAM TOPICAL at 08:32

## 2020-12-08 RX ADMIN — FUROSEMIDE 40 MG: 40 TABLET ORAL at 08:31

## 2020-12-08 RX ADMIN — ARFORMOTEROL TARTRATE 15 MCG: 15 SOLUTION RESPIRATORY (INHALATION) at 07:49

## 2020-12-08 NOTE — PROGRESS NOTES
Afshan Plan:    D/C Home with [facility] - [address] [place] [address]  F/U appointment -  PCP- Kyleigh Torrez NP VV- 12/9/20 at 9:30am and Dr. Ashley Capone- VV 12/11/20 at 11:15.  2nd IM Letter given  [facility].    CM discussed d/c plan with pt. Pt agreed with d/c plan. No questions or concerns at this time. Home based physicians was not able to accept this pt at this time-could be up to 3-4 months VM was left for Daughter. CM scheduled VV visits for pt and provided information on [facility]. [employer] will deliver the trilogy when pt arrives home- pt is to call once she gets home. Medicare pt has received, reviewed, and signed 2nd IM letter informing them of their right to appeal the discharge. Signed copied has been placed on pt bedside chart. Pt cleared for d/c from CM standpoint. Care Management Interventions  PCP Verified by CM: Yes  Palliative Care Criteria Met (RRAT>21 & CHF Dx)?: Yes  Palliative Consult Recommended?: Yes  Mode of Transport at Discharge: BLS([facility] will transport pt.)  Hospital Transport Time of Discharge: 1561  OSEFBWRWCG of Care Consult (CM Consult): Home Health, Discharge [facility]: Yes  Discharge Durable Medical Equipment: Yes(Triology machine-[place])  Health Maintenance Reviewed: Yes  Physical Therapy Consult: Yes  Occupational Therapy Consult: No  Speech Therapy Consult: No  Current Support Network:  Other, Lives with Caregiver(1-story house (4 [address], 1 interior step) with sister, DTR, granddaughter)  Confirm Follow Up Transport: Other (see comment)(Does not leave bed at home; would need medical transport)  The Plan for Transition of Care is Related to the Following Treatment Goals : HH SN vs. Note, Home Health Aides?, Follow-up Appointments, DME, 2nd IM Letter  The Patient and/or Patient Representative was Provided with a Choice of Provider and Agrees with the Discharge Plan?: Yes  Name of the Patient Representative Who was Provided with a Choice of Provider and Agrees with the Discharge Plan: Anthony (pt)Yoko (DTR)  Discharge Location  Discharge Placement: Home with home health    [address] [place] [address] [address] [facility] [address]  Phone: [phone]

## 2020-12-08 NOTE — PROGRESS NOTES
1900- Bedside shift change report given to Al Walter RN (oncoming nurse) by Ofelia Stewart RN (offgoing nurse). Report included the following information SBAR, Kardex, ED Summary, Procedure Summary, Intake/Output, MAR, Recent Results, Med Rec Status and Cardiac Rhythm NSR.     0700- End of Shift Note    Bedside shift change report given to Angela Harrison (oncoming nurse) by Lexii Krishnamurthy (offgoing nurse). Report included the following information SBAR, Kardex, ED Summary, Procedure Summary, Intake/Output, MAR, Recent Results, Med Rec Status and Cardiac Rhythm NSR    Shift worked:  7p-7a     Shift summary and any significant changes:     CO2 >45     Concerns for physician to address:  see above     Zone phone for oncoming shift:   [phone]     Activity:  Activity Level: Bed Rest  Number times ambulated in hallways past shift: 0  Number of times OOB to chair past shift: 0    Cardiac:   Cardiac Monitoring: Yes      Cardiac Rhythm: Normal sinus rhythm    Access:   Current line(s): PIV     Genitourinary:   Urinary status: voiding and external catheter    Respiratory:   O2 Device: Nasal cannula  Chronic home O2 use?: YES  Incentive spirometer at bedside: NO     GI:  Last Bowel Movement Date: 12/01/20  Current diet:  DIET DIABETIC CONSISTENT CARB Regular  DIET NUTRITIONAL SUPPLEMENTS Dinner; Gelatein 20  Passing flatus: YES  Tolerating current diet: YES  % Diet Eaten: 100 %    Pain Management:   Patient states pain is manageable on current regimen: YES    Skin:  Gaston Score: 15  Interventions: internal/external urinary devices    Patient Safety:  Fall Score:  Total Score: 2  Interventions: bed/chair alarm, gripper socks and pt to call before getting OOB  High Fall Risk: Yes    Length of Stay:  Expected LOS: 3d 14h  Actual LOS: [address]

## 2020-12-08 NOTE — PROGRESS NOTES
[facility]  Pulmonary, Critical Care, and Sleep Medicine    Initial Patient Consult    Name: Lincoln Crawford MRN: [id number]   : 1960 Hospital: [facility]   Date: 2020        IMPRESSION:   · Acute/chronic hypoxic/hypercapnic respiratory failure J96.91 and J 96.92; has home O2  · Likely COPD (vs asthma) with exacerbation, no PFTs available  · (COPD OHS Thoracic Restrictive disease ) Restrictive disease from obesity J84.4 and E 66.8  · Afib, refractory  · DM  · Morbid obesity  · Non-ambulatory at baseline due to arthritis      RECOMMENDATIONS:   · Adjust O2 to keep sats 90-95%  · Nutritional counseling done  · Fall precautions  · PT, OT for home PT  · NIV adjusted AVAPS settings  · If pt will never get surgery until she loses 100 pounds, prognosis is horrible. Was told by home PT few visits ago that it would do no good to get her to stand. Left knee is the worst. Perhaps she can perform upper extremoty exercise to burn calories like paraplegic exercises? ?   · Bronchodilators - she should be prescribed Advair 250/50 bid, Spiriva Handihaler 1 inhalation daily, and albuterol nebulizer qid PRN at the time of discharge - these are all covered by Medicaid and should be affordable to a patient on Medicaid  · Due to her recurrent admissions with severe chronic hypercapnic respiratory failure, treatment with a home noninvasive ventilator is indicated. CPAP or BiPAP will not be adequate to correct her severe baseline hypercapnia. Case management consult placed   · We tried to arrange a virtual pulmonary office visit with Dr. Eliceo Dawson after her last hospitalization, but she was not at home long enough for this to occur. · Will try to get PFTs while she is here  · DVT prophylaxis     2020: has two cats. lIves with daughter. Has O2 from last admission. Bad knees. Cannot walk    Subjective:      This patient has been seen and evaluated at the request of Dr. Juan Duarte for respiratory failure. Patient is a 63 y.o. female with chronic hypoxic/hypercapnic respiratory failure, suspected COPD with multiple recent admissions for encephalopathy. She has not been able to have outpatient evaluation due to immobility. She was discharged and supposed to have virtual visit to try to get her a home sleep study, but she returned to the hospital before that occurred. She was not prescribed bronchodilators as recommended at the time of discharge last visit. She is now awake and alert on 2 LPM O2. She has mild dyspnea at this time.      Past Medical History:   Diagnosis Date    Acute on chronic respiratory failure with hypoxia and hypercapnia (HCC) 11/20/2020    Atrial fibrillation ([facility] 11/4/2020    CHF [facility]congestive heart failure) [facility])     Diabetes ([facility]     Hypertension     Ill-defined condition     high cholesterol    Ill-defined condition     tachycardia    JOSLYN (obstructive sleep apnea) 11/20/2020    Other and unspecified symptoms and signs involving general sensations and perceptions     ruptured disc    S/P cardiac cath 11/4/2020    11/3/2020 nonobstructive disease      Past Surgical History:   Procedure Laterality Date    KS COMPRE ELECTROPHYSIOL XM W/LEFT ATRIAL PACNG/REC N/A 11/23/2020    Lt Atrial Pace & Record During Ep Study performed by Valente Jensen MD at [facility] CARDIAC CATH [facility]    KS EPHYS EVAL W/ABLATION SUPRAVENT ARRHYTHMIA N/A 11/23/2020    ABLATION A-FLUTTER performed by Valente Jensen MD at [facility] CARDIAC CATH LAB   [facility] 1 ADD ON N/A 11/23/2020    Ablation Svt/Vt Add On performed by Valente Jensen MD at [facility] CARDIAC CATH LAB    KS INTRACARDIAC ELECTROPHYSIOLOGIC 3D MAPPING N/A 11/23/2020    Ep 3d Mapping performed by Valente Jensen MD at [facility] CARDIAC CATH LAB    STIM/PACING HEART POST IV DRUG INFU N/A 11/23/2020    Drug Stimulation performed by Valente Jensen MD at [facility] CARDIAC CATH LAB      Prior to Admission medications    Medication Sig Start Date End Date Taking? Authorizing Provider   amiodarone (CORDARONE) 200 mg tablet Take 1 Tab by mouth daily for 60 days. 11/25/20 1/24/21 Yes Edelmira Clark MD   aspirin 81 mg chewable tablet Take 1 Tab by mouth daily. 11/5/20  Yes Shavonne Rodriguez MD   OXYGEN-AIR DELIVERY SYSTEMS 3 L/min by Nasal route continuous. 8/31/20  Yes Baljinder Brooks NP   acetaminophen (TYLENOL) 325 mg tablet Take 650 mg by mouth every six (6) hours as needed for Pain. Yes Provider, Historical   furosemide (LASIX) 40 mg tablet Take 1 Tab by mouth daily. 8/7/20  Yes Lacho Heck MD   atorvastatin (LIPITOR) 10 mg tablet Take 10 mg by mouth nightly. Yes Russ Cano MD   metoprolol (LOPRESSOR) 25 mg tablet Take 25 mg by mouth two (2) times a day. Yes Rachael, MD Russ   polyethylene glycol (MIRALAX) 17 gram packet Take 1 Packet by mouth daily for 30 days. 11/25/20 12/25/20  Edelmira Clark MD   predniSONE (DELTASONE) 10 mg tablet 4 tabs daily for 2 days; 3 tabs daily for 2 days; 2 tabs daily for 2 days ;1 tab   daily for 2 days 11/24/20   Edelmira Clark MD   rivaroxaban (XARELTO) 20 mg tab tablet Take 1 Tab by mouth daily (with dinner) for 60 days. 11/24/20 1/23/21  Edelmira Clark MD   menthol-zinc oxide (Moisture Barrier Ointment) 0.44-20.6 % oint Apply 1 Each to affected area three (3) times daily. thin layer moisture associated lesion    Provider, Historical     Allergies   Allergen Reactions    Cefazolin Rash    Other Plant, Animal, Environmental Rash     Ivory soap caused rash on hands. Social History     Tobacco Use    Smoking status: Former Smoker    Smokeless tobacco: Never Used   Substance Use Topics    Alcohol use: No      No family history on file.      Current Facility-Administered Medications   Medication Dose Route Frequency    acetaZOLAMIDE (DIAMOX) tablet 500 mg  500 mg Oral ONCE    predniSONE (DELTASONE) tablet 20 mg  20 mg Oral DAILY WITH BREAKFAST    And    insulin NPH (NOVOLIN N, HUMULIN N) injection 10 Units  10 Units SubCUTAneous DAILY WITH BREAKFAST    arformoteroL (BROVANA) neb solution 15 mcg  15 mcg Nebulization BID RT    balsam peru-castor oiL (VENELEX) ointment   Topical BID    miconazole (SECURA) 2 % extra thick cream   Topical BID    albuterol-ipratropium (DUO-NEB) 2.5 MG-0.5 MG/3 ML  3 mL Nebulization TID RT    insulin lispro (HUMALOG) injection   SubCUTAneous AC&HS    alcohol 62% (NOZIN) nasal  1 Ampule  1 Ampule Topical Q12H    budesonide (PULMICORT) 500 mcg/2 ml nebulizer suspension  500 mcg Nebulization BID RT    sodium chloride (NS) flush 5-40 mL  5-40 mL IntraVENous Q8H    amiodarone (CORDARONE) tablet 200 mg  200 mg Oral DAILY    aspirin chewable tablet 81 mg  81 mg Oral DAILY    atorvastatin (LIPITOR) tablet 10 mg  10 mg Oral QHS    furosemide (LASIX) tablet 40 mg  40 mg Oral DAILY    rivaroxaban (XARELTO) tablet 20 mg  20 mg Oral DAILY WITH DINNER    metoprolol tartrate (LOPRESSOR) tablet 25 mg  25 mg Oral BID       Review of Systems:  A comprehensive review of systems was negative except for that written in the HPI. Objective:   Vital Signs:    Visit Vitals  BP (!) 107/47 (BP 1 Location: Left arm, BP Patient Position: At rest)   Pulse 69   Temp 98.4 °F (36.9 °C)   Resp 18   Ht 5' 3\" (1.6 m)   Wt 136 kg (299 lb 13.2 oz)   SpO2 95%   Breastfeeding No   BMI 53.11 kg/m²       O2 Device: Nasal cannula   O2 Flow Rate (L/min): 4 l/min   Temp (24hrs), Av.2 °F (36.8 °C), Min:97.9 °F (36.6 °C), Max:98.6 °F (37 °C)       Intake/Output:   Last shift:      No intake/output data recorded.   Last 3 shifts:  1901 -  0700  In: 720 [P.O.:720]  Out: 3500 [Urine:3500]    Intake/Output Summary (Last 24 hours) at 2020 0740  Last data filed at 2020 8149  Gross per 24 hour   Intake 480 ml   Output 2900 ml   Net -2420 ml      Physical Exam:   General:  Alert, cooperative, no distress, obese   Head:  Normocephalic, without obvious abnormality, atraumatic. Eyes:  Conjunctivae/corneas clear. Nose: Nares normal. Septum midline. Mucosa normal.     Throat: Lips, mucosa, and tongue normal. Teeth and gums normal.   Neck: Supple, symmetrical, trachea midline    Back:   Symmetric, no curvature. ROM normal.   Lungs:   Poor bilateral air movement   Chest wall:  No tenderness or deformity. Heart:  Regular rate and rhythm    Abdomen:   Soft, non-tender. Bowel sounds normal.     Extremities: Extremities normal, atraumatic, no cyanosis or edema. Skin: Skin color, texture, turgor normal. No rashes or lesions   Lymph nodes: Cervical, supraclavicular  nodes normal.   Neurologic: Grossly nonfocal     Data review:             Labs:    Recent Labs     12/08/20  0538   WBC 7.6   HGB 11.2*   PLT 84*     Recent Labs     12/08/20  0538      K 4.1   CL 95*   CO2 >45*   GLU 78   BUN 30*   CREA 0.54*   CA 9.0   ALB 2.7*   ALT 68     No results for input(s): PH, PCO2, PO2, HCO3, FIO2 in the last 72 hours. No results for input(s): CPK, CKNDX, TROIQ in the last 72 hours.     No lab exists for component: CPKMB  Lab Results   Component Value Date/Time    BNP 14 04/19/2009 05:00 PM            Imaging:  I have personally reviewed the patients radiographs and have reviewed the reports:  Chronic mosaic attenuation of lungs, suggestive of air trapping        Yohan Stevens MD

## 2020-12-08 NOTE — PROGRESS NOTES
Problem: Pressure Injury - Risk of  Goal: *Prevention of pressure injury  Description: Document Gaston Scale and appropriate interventions in the flowsheet. Outcome: Progressing Towards Goal  Note: Pressure Injury Interventions:  Sensory Interventions: Assess changes in LOC, Assess need for specialty bed, Avoid rigorous massage over bony prominences, Float heels, Minimize linen layers, Turn and reposition approx.  every two hours (pillows and wedges if needed), Pressure redistribution bed/mattress (bed type), Pad between skin to skin    Moisture Interventions: Absorbent underpads, Apply protective barrier, creams and emollients, Assess need for specialty bed, Internal/External urinary devices    Activity Interventions: Assess need for specialty bed, Increase time out of bed, Pressure redistribution bed/mattress(bed type), PT/OT evaluation    Mobility Interventions: Assess need for specialty bed, Float heels, HOB 30 degrees or less, PT/OT evaluation    Nutrition Interventions: Document food/fluid/supplement intake, Discuss nutritional consult with provider, Offer support with meals,snacks and hydration    Friction and Shear Interventions: Apply protective barrier, creams and emollients, Feet elevated on foot rest, HOB 30 degrees or less, Lift sheet, Transfer aides:transfer board/Soheila lift/ceiling lift

## 2020-12-08 NOTE — PROGRESS NOTES
Problem: Breathing Pattern - Ineffective  Goal: *Absence of hypoxia  Outcome: Progressing Towards Goal  Goal: *Use of effective breathing techniques  Outcome: Progressing Towards Goal     Problem: Patient Education: Go to Patient Education Activity  Goal: Patient/Family Education  Outcome: Progressing Towards Goal     Problem: Pressure Injury - Risk of  Goal: *Prevention of pressure injury  Description: Document Gaston Scale and appropriate interventions in the flowsheet. Outcome: Progressing Towards Goal  Note: Pressure Injury Interventions:  Sensory Interventions: Assess changes in LOC, Assess need for specialty bed, Avoid rigorous massage over bony prominences, Float heels, Minimize linen layers, Turn and reposition approx. every two hours (pillows and wedges if needed), Pressure redistribution bed/mattress (bed type), Pad between skin to skin    Moisture Interventions: Absorbent underpads, Apply protective barrier, creams and emollients, Assess need for specialty bed, Internal/External urinary devices    Activity Interventions: Assess need for specialty bed, Increase time out of bed, Pressure redistribution bed/mattress(bed type), PT/OT evaluation    Mobility Interventions: Assess need for specialty bed, Float heels, HOB 30 degrees or less, PT/OT evaluation    Nutrition Interventions: Document food/fluid/supplement intake, Discuss nutritional consult with provider, Offer support with meals,snacks and hydration    Friction and Shear Interventions: Apply protective barrier, creams and emollients, Feet elevated on foot rest, HOB 30 degrees or less, Lift sheet, Transfer aides:transfer board/Soheila lift/ceiling lift                Problem: Patient Education: Go to Patient Education Activity  Goal: Patient/Family Education  Outcome: Progressing Towards Goal     Problem: Falls - Risk of  Goal: *Absence of Falls  Description: Document Anna Fall Risk and appropriate interventions in the flowsheet.   Outcome: Progressing Towards Goal  Note: Fall Risk Interventions:  Mobility Interventions: Assess mobility with egress test, Bed/chair exit alarm, Patient to call before getting OOB, PT Consult for mobility concerns, PT Consult for assist device competence, Strengthening exercises (ROM-active/passive), Communicate number of staff needed for ambulation/transfer, Mechanical lift         Medication Interventions: Assess postural VS orthostatic hypotension, Bed/chair exit alarm, Patient to call before getting OOB, Teach patient to arise slowly    Elimination Interventions: Bed/chair exit alarm, Call light in reach, Patient to call for help with toileting needs, Toileting schedule/hourly rounds    History of Falls Interventions: Bed/chair exit alarm, Room close to nurse's station, Utilize gait belt for transfer/ambulation, Vital signs minimum Q4HRs X 24 hrs (comment for end date), Assess for delayed presentation/identification of injury for 48 hrs (comment for end date), Door open when patient unattended         Problem: Patient Education: Go to Patient Education Activity  Goal: Patient/Family Education  Outcome: Progressing Towards Goal     Problem: Chronic Obstructive Pulmonary Disease (COPD)  Goal: *Oxygen saturation during activity within specified parameters  Outcome: Progressing Towards Goal  Goal: *Able to remain out of bed as prescribed  Outcome: Progressing Towards Goal  Goal: *Absence of hypoxia  Outcome: Progressing Towards Goal  Goal: *Optimize nutritional status  Outcome: Progressing Towards Goal     Problem: Patient Education: Go to Patient Education Activity  Goal: Patient/Family Education  Outcome: Progressing Towards Goal

## 2020-12-08 NOTE — FACE TO FACE
Home Health Care Discharge Planning: [facility]  Face to Face Encounter      NAME: Barron Goff   :  1960   MRN:  [id number]     Primary Diagnosis: COPD, JOSLYN, morbid obesity hypoventilation syndrome    Date of Face to Face:  2020 3:19 PM                                  Face to Face Encounter findings are related to primary reason for home care:   YES    1. I certify that the patient needs intermittent skilled nursing care, physical therapy and/or speech therapy. I will not be following this patient in the Community and Dr. Juanita Frazier, NP will be responsible for signing the [address] of Care. 2. Initial Orders for Care: Skilled Nursing, Physical Therapy and Occupational Therapy    3. I certify that this patient is homebound because of illness or injury, need the aid of supportive devices such as crutches, canes, wheelchairs, and walkers; the use of special transportation; or the assistance of another person in order to leave their place of residence. There exists a normal inability to leave home and leaving home requires a considerable and taxing effort. 4. I certify that this patient is under my care and that I had a Face-to-Face Encounter that meets the physician Face-to-Face Encounter requirements. Document the physical findings from the Face-to-Face Encounter that support the need for skilled services: Has new diagnosis that requires skilled nursing teaching and intervention , Has new medications that requires skilled nursing teaching and monitoring for understanding and compliance , Needs skilled safety assessment and interventions  and Has new finding of weakness and altered mobility that requires skilled physical/occupational and/or speech therapy services for evaluation and interventions.      Sean Grijalva MD  Discharging Physician  Office: [phone]  Fax:   [phone]

## 2020-12-08 NOTE — PROGRESS NOTES
0700: Bedside shift change report given to Francisco RN (oncoming nurse) by Kaleigh Pantoja RN (offgoing nurse). Report included the following information SBAR, Kardex, Intake/Output and MAR.     0700: Patient in bed, resting quietly. Call bell in reach, will monitor. 0740: Dr. Malissa Padron at bedside. Will continue current treatment and hope for home Triology to be set up. Pt/OT consulted for discharge plan. 1140: PT/OT at bedside working with patient. Will give patient some upper body exercises to work on. Will follow up with patient on Thursday. 1320: Dr. Marilin Shen at bedside assessing patient. Agrees with treatment plan, no new orders placed. 1525:  was able to get trilogy set up today. Dr. Marilin Shen placed transfer order. [facility] set up for 1600.     1555: Discharge teaching done with patient, scripts placed with discharge paperwork and patient belongings. Will remove PIV and await for [facility] transport. 1700: [facility] transport is here to transport patient.

## 2020-12-08 NOTE — PROGRESS NOTES
Problem: Self Care Deficits Care Plan (Adult)  Goal: *Acute Goals and Plan of Care (Insert Text)  Description:   FUNCTIONAL STATUS PRIOR TO ADMISSION: Pt reports residing with daughter in single story home with 5 ADDISON, stating she had ramped entrance built by brother in law; however, \"it didn't last long,\" with pt reporting dependence for toileting and LE ADLs, with Max A for UB dressing and bathing and S/U for bed level grooming and self-feeding. Pt reports being bed bound at home. HOME SUPPORT: The patient lived with daughter who provides heavy ADL/IADL assist.    Occupational Therapy Goals  Initiated 12/8/2020  1. Patient will perform toilet transfers, rolling on/off bedpan, with moderate assistance within 7 day(s). 2.  Patient will inclined bilateral UE strengthening HEP with modified independence, 100% accuracy and use of provided handout within 7 day(s). Outcome: Not Met    OCCUPATIONAL THERAPY EVALUATION  Patient: Claudia Vazquez (73 y.o. female)  Date: 12/8/2020  Primary Diagnosis: Acute on chronic respiratory failure with hypoxia and hypercapnia (HCC) [J96.21, J96.22]        Precautions:  Skin, Fall    ASSESSMENT  Based on the objective data described below, the patient presents with decreased mobility/balance, decreased activity tolerance, decreased full body reaching and decreased strength/endurance; however, she is functioning near reported baseline, stating she has dependent assist for LE dressing and toileting (bedpan level), Max A for full body bathing and UB dressing at bed level and S/U for inclined self-feeding and grooming at baseline. Chart indicates pt needs to lose 100lbs to be candidate for knee surgery; therefore, OT involved in POC to establish bed level BUE strengthening HEP to assist with weight loss, with pt provided with dowel, red and green theraband and written/pictorial based handouts and demonstrating good/fair understanding.   Reporting therapist believes pt would benefit from 1 remaining skilled OT treatment to address pt's HEP independent technique, with reporting therapist believing pt would benefit from [facility] and total A bed level ADL assist upon discharge. Current Level of Function Impacting Discharge (ADLs/self-care): dependent assist for LE dressing and toileting (bedpan level), Max A for full body bathing and UB dressing at bed level and S/U for inclined self-feeding and grooming. Functional Outcome Measure: The patient scored 25/100 on the Barthel Index outcome measure. Other factors to consider for discharge: bed bound     Patient will benefit from skilled therapy intervention to address the above noted impairments. PLAN :  Recommendations and Planned Interventions: self care training, functional mobility training, and therapeutic exercise    Frequency/Duration: Patient will be followed by occupational therapy 2 times a week to address goals. Recommendation for discharge: (in order for the patient to meet his/her long term goals)  Occupational therapy at least 2 days/week in the home AND ensure assist and/or supervision for safety with ADL/IADLs    This discharge recommendation:  Has not yet been discussed the attending provider and/or case management    IF patient discharges home will need the following DME: patient owns DME required for discharge       SUBJECTIVE:   Patient stated my daughter helps me a lot at home.     OBJECTIVE DATA SUMMARY:   HISTORY:   Past Medical History:   Diagnosis Date    Acute on chronic respiratory failure with hypoxia and hypercapnia ([address] 11/20/2020    Atrial fibrillation ([address] 11/4/2020    CHF (congestive heart failure) (HCC)     Diabetes ([facility]     Hypertension     Ill-defined condition     high cholesterol    Ill-defined condition     tachycardia    JOSLYN (obstructive sleep apnea) 11/20/2020    Other and unspecified symptoms and signs involving general sensations and perceptions     ruptured disc    S/P cardiac cath 11/4/2020    11/3/2020 nonobstructive disease     Past Surgical History:   Procedure Laterality Date    OR COMPRE ELECTROPHYSIOL XM W/LEFT ATRIAL PACNG/REC N/A 11/23/2020    Lt Atrial Pace & Record During Ep Study performed by Carmelina Ramirez MD at [facility] CARDIAC CATH LAB    OR EPHYS EVAL W/ABLATION SUPRAVENT ARRHYTHMIA N/A 11/23/2020    ABLATION A-FLUTTER performed by Carmelina Ramirez MD at [facility] CARDIAC CATH LAB    OR ICAR CATHETER ABLATION ARRHYTHMIA ADD ON N/A 11/23/2020    Ablation Svt/Vt Add On performed by Carmelina Ramirez MD at [facility] CARDIAC CATH LAB    OR INTRACARDIAC ELECTROPHYSIOLOGIC 3D MAPPING N/A 11/23/2020    Ep 3d Mapping performed by Carmelina Ramirez MD at [facility] CARDIAC CATH LAB    STIM/PACING HEART POST IV DRUG INFU N/A 11/23/2020    Drug Stimulation performed by Carmelina Ramirez MD at [facility] LAB       Expanded or extensive additional review of patient history:     Home Situation  Home Environment: Private residence  # Steps to Enter: 5  Rails to Enter: Yes  Hand Rails : Right  Wheelchair Ramp: No  One/Two Story Residence: One story  Living Alone: No  Support Systems: Child(dejuan)  Patient Expects to be Discharged to[de-identified] Private residence  Current DME Used/Available at Home: Commode, bedside, Grab bars, Shower chair, Oxygen, portable, Hospital bed, Glucometer, Walker, rolling, Wheelchair  Tub or Shower Type: (bathes mat bed level)    Hand dominance: Right    EXAMINATION OF PERFORMANCE DEFICITS:  Cognitive/Behavioral Status:  Neurologic State: Alert  Orientation Level: Oriented X4  Cognition: Follows commands     Perseveration: No perseveration noted  Safety/Judgement: Awareness of environment; Insight into deficits    Hearing: Auditory  Auditory Impairment: None    Vision/Perceptual:    Corrective Lenses: Glasses    Range of Motion:  AROM: Within functional limits(BUE)  PROM: Within functional limits(BUE)    Strength:  Strength:  Within functional limits(BUE)    Coordination:  Coordination: Within functional limits(BUE)  Fine Motor Skills-Upper: Left Intact; Right Intact    Gross Motor Skills-Upper: Left Intact; Right Intact    Tone & Sensation:  Tone: Normal  Sensation: Intact    Balance:  Sitting: (not tested)  Standing: (not tested)    Functional Mobility and Transfers for ADLs:  Bed Mobility:  Rolling: Maximum assistance    ADL Assessment:  Feeding: Setup    Oral Facial Hygiene/Grooming: Setup    Bathing: Maximum assistance    Upper Body Dressing: Maximum assistance    Lower Body Dressing: Total assistance    Toileting: Total assistance    ADL Intervention and task modifications:  Cognitive Retraining  Safety/Judgement: Awareness of environment; Insight into deficits    Therapeutic Exercise:  BUE HEP implemented, with focus on shoulder flexion/extension/horizontal ab/adduction, elbow flexion/extension, shoulder IR/ER and general activity tolerance, with Supervision and Max verbal/visual cues for technique. Intermittent rest breaks PRN, no c/o pain. Functional Measure:  Barthel Index:    Bathin  Bladder: 10  Bowels: 5  Groomin  Dressin  Feedin  Mobility: 0  Stairs: 0  Toilet Use: 0  Transfer (Bed to Chair and Back): 0  Total: 25/100        The Barthel ADL Index: Guidelines  1. The index should be used as a record of what a patient does, not as a record of what a patient could do. 2. The main aim is to establish degree of independence from any help, physical or verbal, however minor and for whatever reason. 3. The need for supervision renders the patient not independent. 4. A patient's performance should be established using the best available evidence. Asking the patient, friends/relatives and nurses are the usual sources, but direct observation and common sense are also important. However direct testing is not needed.   5. Usually the patient's performance over the preceding 24-48 hours is important, but occasionally longer periods will be relevant. 6. Middle categories imply that the patient supplies over 50 per cent of the effort. 7. Use of aids to be independent is allowed. Cristian Ruelas., Barthel, DEliazarW. (7996). Functional evaluation: the Barthel Index. [address] [facility] (14)2. MITCH May, Kenny Mcdowell., Erika Vallejo., [place], [address] [facility] (1999). Measuring the change indisability after inpatient rehabilitation; comparison of the responsiveness of the Barthel Index and Functional [place] Measure. Journal of Neurology, Neurosurgery, and Psychiatry, 66(4), 287-753. Meredith Boss, [email or web address].A, JAME Figueroa, & Jero Benson MNATASHA. (2004.) Assessment of post-stroke quality of life in cost-effectiveness studies: The usefulness of the Barthel Index and the EuroQoL-5D. Quality of Life Research, 15, 070-26     Occupational Therapy Evaluation Charge Determination   History Examination Decision-Making   LOW Complexity : Brief history review  HIGH Complexity : 5 or more performance deficits relating to physical, cognitive , or psychosocial skils that result in activity limitations and / or participation restrictions LOW Complexity : No comorbidities that affect functional and no verbal or physical assistance needed to complete eval tasks       Based on the above components, the patient evaluation is determined to be of the following complexity level: LOW   Pain Rating:  No c/o pain    Activity Tolerance:   Fair and requires rest breaks    After treatment patient left in no apparent distress:    Supine in bed, Call bell within reach, Bed / chair alarm activated, and Side rails x 3    COMMUNICATION/EDUCATION:   The patients plan of care was discussed with: Registered nurse. Home safety education was provided and the patient/caregiver indicated understanding., Patient/family have participated as able in goal setting and plan of care. , and Patient/family agree to work toward stated goals and plan of care.     This patients plan of care is appropriate for delegation to ALEJANDRA.     Thank you for this referral.  Seretha Moritz, OT  Time Calculation: 36 mins

## 2020-12-08 NOTE — DISCHARGE INSTRUCTIONS
You were seen for confusion. Every time you lay down, your oxygen levels dropped significantly. When you do not get enough oxygen to your brain, this makes you confused. Your oxygen levels go back up when you sit up. Please make sure you never lay down flat as this significantly changes your oxygen level. If you have to lay down flat for changing, please make sure it is for a brief amount of time only. Please make sure to wear your BiPAP at night every time. HOSPITALIST DISCHARGE INSTRUCTIONS    NAME: Claudia Vazquez   :  1960   MRN:  [id number]     Date/Time:  2020 3:06 PM    ADMIT DATE: 2020     DISCHARGE DATE: 2020     DISCHARGE DIAGNOSIS:  Acute on chronic hypoxic and hypercapnic respiratory failure   Acute metabolic encephalopathy   Suspected COPD exacerbation   Probable JOSLYN and OHS   Paroxysmal Atrial Fib     MEDICATIONS:  · It is important that you take the medication exactly as they are prescribed. · Keep your medication in the bottles provided by the pharmacist and keep a list of the medication names, dosages, and times to be taken in your wallet. · Do not take other medications without consulting your doctor. Pain Management: per above medications    What to do at Home    Recommended diet:  Low fat, Low cholesterol    Recommended activity: Activity as tolerated    If you have questions regarding the hospital related prescriptions or hospital related issues please call [facility] . If you experience any of the following symptoms then please call your primary care physician or return to the emergency room if you cannot get hold of your doctor:  Fever, chills, nausea, vomiting, diarrhea, change in mentation, falling, bleeding, shortness of breath    Follow Up:  Dr. Anitha Sarabia NP  you are to call and set up an appointment to see them in 7-10 days.       Information obtained by :  I understand that if any problems occur once I am at home I am to contact my physician. I understand and acknowledge receipt of the instructions indicated above.                                                                                                                                            Physician's or R.N.'s Signature                                                                  Date/Time                                                                                                                                              Patient or Representative Signature                                                          Date/Time

## 2020-12-08 NOTE — TELEPHONE ENCOUNTER
[facility]   Phone:  [phone]      Fax:  [phone] [facility], [address] [place] [address], [address] [place] [address]    Name:  Jocelyn Sheikh  YOB: 1960    Received email from Mendez Gilman [place] who reports that Bhanu Kerns is bedbound in her home, not able to get to appointments, only phone visits not virtual visits. Ms. Bj Cheney requests that pt be evaluated for [facility] services. Lives with Daughter who is caregiver. This nurse called pt's daughter Phil Push, no answer. Left message that [facility] is currently not accepting new patients and it will likely be at least 3-4 months before the program opens up to new patients. Sent email to Mendez Gilman informing her of above.     Paris Tan, RN  Referral Navigator, [facility]

## 2020-12-08 NOTE — DISCHARGE SUMMARY
Hospitalist Discharge Summary     Patient ID:  Lisa Leiva  [id number]  63 y.o.  1960    PCP on record: Kyleigh Torrez NP    Admit date: 2020  Discharge date and time: 2020    DISCHARGE DIAGNOSIS:  Acute on chronic hypoxic and hypercapnic respiratory failure   Acute metabolic encephalopathy   Suspected COPD exacerbation   Probable JOSLYN and OHS   Remote tobacco use  Paroxysmal Atrial Fib   Morbid obesity   Impaired mobility    CONSULTATIONS:  IP CONSULT TO PULMONOLOGY  IP CONSULT TO PULMONOLOGY    Excerpted HPI from H&P of Chaitanya Hubbard, DO:  Mahnaz Stone is a 63 y.o.  female with past medical history of chronic hypoxic and hypercapnic respiratory failure with suspected COPD, probable JOSLYN and obesity hypoventilation syndrome, morbid obesity, refractory A. fib, CHF, DM2, HTN, and HLD who is brought to the ED by EMS after being found confused at home, reportedly speaking to  family members. Patient was found to be on 3 L NC, which is baseline, and maintaining saturations. Per chart notes, O2 saturations dropped into the 50s when patient was transferred from the EMS stretcher to hospital bed. Of note, patient was just discharged from [facility] 2020 with slurred presentation. She did undergo cardioversion but no A. fib ablation during last hospitalization. Patient was also discharged from here 2020 with similar complaints and seen again in the ED 2020 also with SOB and altered mental status. She currently denies chest pain, nausea, vomiting, or diaphoresis. She denies fevers or chills.     We were asked to admit for work up and evaluation of the above problems. ______________________________________________________________________  DISCHARGE SUMMARY/HOSPITAL COURSE:  for full details see H&P, daily progress notes, labs, consult notes.      Acute on chronic hypoxic and hypercapnic respiratory failure POA  Acute metabolic encephalopathy POA  Suspected COPD exacerbation POA  Probable JOSLYN and OHS POA  Remote tobacco use  3rd admit in past 5 weeks for respiratory failure  baseline on 2 liters NC, now on 4L NC and BIPAP at night here  Pulmonary consult appreciated, she will be discharge on Trilogy BiPAP to be used at night and 4L   Recent COVID tests negative 11/18, repeat rapid rest negative this admit  CTA chest:  No CT evidence for central pulmonary embolus at this time,  Stable patchy diffuse mosaic attenuation of lung parenchyma with bibasilar, atelectasis since 4 months ago, probably more suggestive of chronic small  airways lung disease, although atypical infection may present in a similar fashion. Borderline size of the main pulmonary outflow tract. Correlate for pulmonary hypertension  Started Px inhalers and PRN nebs  Taper steroids  Baseline non ambulatory due to osteoarthritis  Spoke with daughter by phone     Paroxysmal Atrial Fib POA  Admit EKG with NSR  Continue amiodarone and Xarelto  Multiple prior cardioversion procedures     Hyperglycemia on on steroids  Hb A1C 5.5  Now tapering off steroids     Morbid obesity POA Body mass index is 53.11 kg/m². Impaired mobility  Reportedly mostly bedbound at home     Code Status: Full  Palliative care consult to discuss code status, goals of care. Pt remain full code     Surrogate Decision Maker: Junior Cruz, daughter     _______________________________________________________________________  Patient seen and examined by me on discharge day. Pertinent Findings:  Gen:    Not in distress  Chest: Clear lungs  CVS:   Regular rhythm.   No edema  Abd:  Soft, not distended, not tender  Neuro:  Alert,   _______________________________________________________________________  DISCHARGE MEDICATIONS:   Current Discharge Medication List      START taking these medications    Details   albuterol-ipratropium (DUO-NEB) 2.5 mg-0.5 mg/3 ml nebu 3 mL by Nebulization route every four (4) hours as needed for Wheezing, Shortness of Breath or Respiratory Distress. Qty: 100 Nebule, Refills: 0      fluticasone propion-salmeteroL (Advair Diskus) 250-50 mcg/dose diskus inhaler Take 1 Puff by inhalation every twelve (12) hours. Qty: 1 Each, Refills: 0      tiotropium (Spiriva with HandiHaler) 18 mcg inhalation capsule Take 1 Cap by inhalation daily. Qty: 30 Cap, Refills: 0      Nebulizer & Compressor machine To use nebulizer as directed  Qty: 1 Each, Refills: 0         CONTINUE these medications which have CHANGED    Details   predniSONE (DELTASONE) 10 mg tablet Take 10 mg by mouth daily (with breakfast). Qty: 2 Tab, Refills: 0         CONTINUE these medications which have NOT CHANGED    Details   amiodarone (CORDARONE) 200 mg tablet Take 1 Tab by mouth daily for 60 days. Qty: 30 Tab, Refills: 1      aspirin 81 mg chewable tablet Take 1 Tab by mouth daily. Qty: 30 Tab, Refills: 0      OXYGEN-AIR DELIVERY SYSTEMS 3 L/min by Nasal route continuous. acetaminophen (TYLENOL) 325 mg tablet Take 650 mg by mouth every six (6) hours as needed for Pain. furosemide (LASIX) 40 mg tablet Take 1 Tab by mouth daily. Qty: 30 Tab, Refills: 0      atorvastatin (LIPITOR) 10 mg tablet Take 10 mg by mouth nightly. metoprolol (LOPRESSOR) 25 mg tablet Take 25 mg by mouth two (2) times a day. polyethylene glycol (MIRALAX) 17 gram packet Take 1 Packet by mouth daily for 30 days. Qty: 30 Packet, Refills: 0      rivaroxaban (XARELTO) 20 mg tab tablet Take 1 Tab by mouth daily (with dinner) for 60 days. Qty: 30 Tab, Refills: 1      menthol-zinc oxide (Moisture Barrier Ointment) 0.44-20.6 % oint Apply 1 Each to affected area three (3) times daily.  thin layer moisture associated lesion                 Follow-up Information     Follow up With Specialties Details Why Contact Info    Glenn Gonzalez NP Nurse Practitioner  As needed [address] [facility]      Shannan Carlisle MD Internal Medicine, Pulmonary Disease   [address] [facility] [address] [place] [address]  [phone]          ________________________________________________________________    Risk of deterioration: Moderate    Condition at Discharge:  Stable  __________________________________________________________________    Disposition  Home with family and home health services    ____________________________________________________________________    Code Status: Full Code  ___________________________________________________________________      Total time in minutes spent coordinating this discharge (includes going over instructions, follow-up, prescriptions, and preparing report for sign off to her PCP) :  35 minutes    Signed:  Shelbi Falcon MD

## 2020-12-09 ENCOUNTER — PATIENT OUTREACH (OUTPATIENT)
Dept: CASE MANAGEMENT | Age: 60
End: 2020-12-09

## 2020-12-09 PROCEDURE — 3331090002 HH PPS REVENUE DEBIT

## 2020-12-09 PROCEDURE — 3331090001 HH PPS REVENUE CREDIT

## 2020-12-09 NOTE — PROGRESS NOTES
No transition of care outreach indicated due to patient being treated by [address] [place] [address] or Home Based Palliative team.

## 2020-12-10 ENCOUNTER — HOME CARE VISIT (OUTPATIENT)
Dept: SCHEDULING | Facility: HOME HEALTH | Age: 60
End: 2020-12-10
Payer: MEDICARE

## 2020-12-10 PROCEDURE — G0299 HHS/HOSPICE OF RN EA 15 MIN: HCPCS

## 2020-12-10 PROCEDURE — 400013 HH SOC

## 2020-12-10 PROCEDURE — 3331090002 HH PPS REVENUE DEBIT

## 2020-12-10 PROCEDURE — 3331090001 HH PPS REVENUE CREDIT

## 2020-12-11 PROCEDURE — 3331090002 HH PPS REVENUE DEBIT

## 2020-12-11 PROCEDURE — 3331090001 HH PPS REVENUE CREDIT

## 2020-12-12 PROCEDURE — 3331090002 HH PPS REVENUE DEBIT

## 2020-12-12 PROCEDURE — 3331090001 HH PPS REVENUE CREDIT

## 2020-12-13 VITALS
RESPIRATION RATE: 18 BRPM | SYSTOLIC BLOOD PRESSURE: 120 MMHG | HEART RATE: 72 BPM | DIASTOLIC BLOOD PRESSURE: 60 MMHG | TEMPERATURE: 98.4 F | OXYGEN SATURATION: 97 %

## 2020-12-13 PROCEDURE — 3331090002 HH PPS REVENUE DEBIT

## 2020-12-13 PROCEDURE — 3331090001 HH PPS REVENUE CREDIT

## 2020-12-14 PROCEDURE — 3331090001 HH PPS REVENUE CREDIT

## 2020-12-14 PROCEDURE — 3331090002 HH PPS REVENUE DEBIT

## 2020-12-15 PROCEDURE — 3331090002 HH PPS REVENUE DEBIT

## 2020-12-15 PROCEDURE — 3331090001 HH PPS REVENUE CREDIT

## 2020-12-16 ENCOUNTER — HOME CARE VISIT (OUTPATIENT)
Dept: SCHEDULING | Facility: HOME HEALTH | Age: 60
End: 2020-12-16
Payer: MEDICARE

## 2020-12-16 VITALS
TEMPERATURE: 97 F | DIASTOLIC BLOOD PRESSURE: 64 MMHG | OXYGEN SATURATION: 97 % | HEART RATE: 76 BPM | SYSTOLIC BLOOD PRESSURE: 118 MMHG

## 2020-12-16 VITALS
SYSTOLIC BLOOD PRESSURE: 120 MMHG | TEMPERATURE: 97.5 F | BODY MASS INDEX: 51.37 KG/M2 | OXYGEN SATURATION: 96 % | WEIGHT: 290 LBS | HEART RATE: 88 BPM | DIASTOLIC BLOOD PRESSURE: 62 MMHG

## 2020-12-16 VITALS
SYSTOLIC BLOOD PRESSURE: 118 MMHG | HEART RATE: 76 BPM | TEMPERATURE: 97 F | OXYGEN SATURATION: 97 % | DIASTOLIC BLOOD PRESSURE: 64 MMHG | RESPIRATION RATE: 18 BRPM

## 2020-12-16 PROCEDURE — G0152 HHCP-SERV OF OT,EA 15 MIN: HCPCS

## 2020-12-16 PROCEDURE — G0151 HHCP-SERV OF PT,EA 15 MIN: HCPCS

## 2020-12-16 PROCEDURE — G0299 HHS/HOSPICE OF RN EA 15 MIN: HCPCS

## 2020-12-16 PROCEDURE — G0156 HHCP-SVS OF AIDE,EA 15 MIN: HCPCS

## 2020-12-16 PROCEDURE — 3331090002 HH PPS REVENUE DEBIT

## 2020-12-16 PROCEDURE — 3331090001 HH PPS REVENUE CREDIT

## 2020-12-16 NOTE — PROGRESS NOTES
PT evaluation performed. Pt/caregiver stating she is at baseline and concern about she will continue to have atrophy and deconditioning but does not appear to be good rehab candidate; on chart review noting she was discharged approx 1 month ago from PT due to non compliance and request for discharge and pt today stating she does not feel she can do exercises so unlikely to change participation. Additional concern about home safety with clutter, inabiliyt to leave bedroom in emergency, soiled bed linens/clothing that may be contributing to skin breakdown  and report filed with APS.  Anticipate some of these issues may improve if she is has a private careaide in home to assist with these and states she has medicaid but caregiver has not received return call from medicaidl following up on this and anticipate will be addressed with MSW referral.

## 2020-12-17 PROCEDURE — 3331090001 HH PPS REVENUE CREDIT

## 2020-12-17 PROCEDURE — 3331090002 HH PPS REVENUE DEBIT

## 2020-12-18 ENCOUNTER — HOME CARE VISIT (OUTPATIENT)
Dept: HOME HEALTH SERVICES | Facility: HOME HEALTH | Age: 60
End: 2020-12-18
Payer: MEDICARE

## 2020-12-18 PROCEDURE — 3331090002 HH PPS REVENUE DEBIT

## 2020-12-18 PROCEDURE — 3331090001 HH PPS REVENUE CREDIT

## 2020-12-19 PROCEDURE — 3331090002 HH PPS REVENUE DEBIT

## 2020-12-19 PROCEDURE — 3331090001 HH PPS REVENUE CREDIT

## 2020-12-20 PROCEDURE — 3331090001 HH PPS REVENUE CREDIT

## 2020-12-20 PROCEDURE — 3331090002 HH PPS REVENUE DEBIT

## 2020-12-21 ENCOUNTER — HOME CARE VISIT (OUTPATIENT)
Dept: SCHEDULING | Facility: HOME HEALTH | Age: 60
End: 2020-12-21
Payer: MEDICARE

## 2020-12-21 ENCOUNTER — HOME CARE VISIT (OUTPATIENT)
Dept: HOME HEALTH SERVICES | Facility: HOME HEALTH | Age: 60
End: 2020-12-21
Payer: MEDICARE

## 2020-12-21 PROCEDURE — 3331090001 HH PPS REVENUE CREDIT

## 2020-12-21 PROCEDURE — G0299 HHS/HOSPICE OF RN EA 15 MIN: HCPCS

## 2020-12-21 PROCEDURE — 3331090002 HH PPS REVENUE DEBIT

## 2020-12-22 PROCEDURE — 3331090001 HH PPS REVENUE CREDIT

## 2020-12-22 PROCEDURE — 3331090002 HH PPS REVENUE DEBIT

## 2020-12-23 ENCOUNTER — HOME CARE VISIT (OUTPATIENT)
Dept: SCHEDULING | Facility: HOME HEALTH | Age: 60
End: 2020-12-23
Payer: MEDICARE

## 2020-12-23 PROCEDURE — 3331090001 HH PPS REVENUE CREDIT

## 2020-12-23 PROCEDURE — G0155 HHCP-SVS OF CSW,EA 15 MIN: HCPCS

## 2020-12-23 PROCEDURE — 3331090002 HH PPS REVENUE DEBIT

## 2020-12-24 ENCOUNTER — HOME CARE VISIT (OUTPATIENT)
Dept: SCHEDULING | Facility: HOME HEALTH | Age: 60
End: 2020-12-24
Payer: MEDICARE

## 2020-12-24 PROCEDURE — 3331090001 HH PPS REVENUE CREDIT

## 2020-12-24 PROCEDURE — 3331090002 HH PPS REVENUE DEBIT

## 2020-12-24 PROCEDURE — G0156 HHCP-SVS OF AIDE,EA 15 MIN: HCPCS

## 2020-12-24 PROCEDURE — G0299 HHS/HOSPICE OF RN EA 15 MIN: HCPCS

## 2020-12-25 PROCEDURE — 3331090002 HH PPS REVENUE DEBIT

## 2020-12-25 PROCEDURE — 3331090001 HH PPS REVENUE CREDIT

## 2020-12-26 PROCEDURE — 3331090001 HH PPS REVENUE CREDIT

## 2020-12-26 PROCEDURE — 3331090002 HH PPS REVENUE DEBIT

## 2020-12-27 VITALS
OXYGEN SATURATION: 96 % | DIASTOLIC BLOOD PRESSURE: 62 MMHG | HEART RATE: 88 BPM | TEMPERATURE: 97.6 F | SYSTOLIC BLOOD PRESSURE: 120 MMHG

## 2020-12-27 PROCEDURE — 3331090001 HH PPS REVENUE CREDIT

## 2020-12-27 PROCEDURE — 3331090002 HH PPS REVENUE DEBIT

## 2020-12-28 VITALS
HEART RATE: 72 BPM | DIASTOLIC BLOOD PRESSURE: 60 MMHG | RESPIRATION RATE: 18 BRPM | TEMPERATURE: 98.4 F | OXYGEN SATURATION: 94 % | SYSTOLIC BLOOD PRESSURE: 120 MMHG

## 2020-12-28 PROCEDURE — 3331090002 HH PPS REVENUE DEBIT

## 2020-12-28 PROCEDURE — 3331090001 HH PPS REVENUE CREDIT

## 2020-12-29 ENCOUNTER — HOME CARE VISIT (OUTPATIENT)
Dept: SCHEDULING | Facility: HOME HEALTH | Age: 60
End: 2020-12-29
Payer: MEDICARE

## 2020-12-29 PROCEDURE — G0299 HHS/HOSPICE OF RN EA 15 MIN: HCPCS

## 2020-12-29 PROCEDURE — G0156 HHCP-SVS OF AIDE,EA 15 MIN: HCPCS

## 2020-12-29 PROCEDURE — 3331090001 HH PPS REVENUE CREDIT

## 2020-12-29 PROCEDURE — 3331090002 HH PPS REVENUE DEBIT

## 2020-12-30 PROCEDURE — 3331090002 HH PPS REVENUE DEBIT

## 2020-12-30 PROCEDURE — 3331090001 HH PPS REVENUE CREDIT

## 2020-12-31 ENCOUNTER — HOME CARE VISIT (OUTPATIENT)
Dept: HOME HEALTH SERVICES | Facility: HOME HEALTH | Age: 60
End: 2020-12-31
Payer: MEDICARE

## 2020-12-31 PROCEDURE — 3331090001 HH PPS REVENUE CREDIT

## 2020-12-31 PROCEDURE — 3331090002 HH PPS REVENUE DEBIT

## 2021-01-01 PROCEDURE — 3331090002 HH PPS REVENUE DEBIT

## 2021-01-01 PROCEDURE — 3331090001 HH PPS REVENUE CREDIT

## 2021-01-02 VITALS
HEART RATE: 83 BPM | SYSTOLIC BLOOD PRESSURE: 92 MMHG | TEMPERATURE: 98.6 F | RESPIRATION RATE: 18 BRPM | OXYGEN SATURATION: 97 % | DIASTOLIC BLOOD PRESSURE: 62 MMHG

## 2021-01-02 PROCEDURE — 3331090001 HH PPS REVENUE CREDIT

## 2021-01-02 PROCEDURE — 3331090002 HH PPS REVENUE DEBIT

## 2021-01-03 PROCEDURE — 3331090002 HH PPS REVENUE DEBIT

## 2021-01-03 PROCEDURE — 3331090001 HH PPS REVENUE CREDIT

## 2021-01-04 ENCOUNTER — HOME CARE VISIT (OUTPATIENT)
Dept: SCHEDULING | Facility: HOME HEALTH | Age: 61
End: 2021-01-04
Payer: MEDICARE

## 2021-01-04 ENCOUNTER — HOME CARE VISIT (OUTPATIENT)
Dept: HOME HEALTH SERVICES | Facility: HOME HEALTH | Age: 61
End: 2021-01-04
Payer: MEDICARE

## 2021-01-04 PROCEDURE — 3331090001 HH PPS REVENUE CREDIT

## 2021-01-04 PROCEDURE — G0299 HHS/HOSPICE OF RN EA 15 MIN: HCPCS

## 2021-01-04 PROCEDURE — G0156 HHCP-SVS OF AIDE,EA 15 MIN: HCPCS

## 2021-01-04 PROCEDURE — 3331090002 HH PPS REVENUE DEBIT

## 2021-01-04 PROCEDURE — 3331090003 HH PPS REVENUE ADJ

## 2021-01-05 VITALS
HEART RATE: 82 BPM | OXYGEN SATURATION: 98 % | RESPIRATION RATE: 22 BRPM | SYSTOLIC BLOOD PRESSURE: 132 MMHG | TEMPERATURE: 96.6 F | DIASTOLIC BLOOD PRESSURE: 70 MMHG

## 2021-01-21 ENCOUNTER — HOME CARE VISIT (OUTPATIENT)
Dept: HOME HEALTH SERVICES | Facility: HOME HEALTH | Age: 61
End: 2021-01-21

## 2021-10-25 ENCOUNTER — TELEPHONE (OUTPATIENT)
Dept: PALLATIVE CARE | Age: 61
End: 2021-10-25

## 2021-10-25 NOTE — TELEPHONE ENCOUNTER
[address] [facility],[address] w/ [employer] is calling for possible home based primary care. Please give her a call back [phone].

## 2022-04-07 NOTE — PROGRESS NOTES
Problem: Falls - Risk of  Goal: *Absence of Falls  Description: [facility] and appropriate interventions in the flowsheet. Outcome: Progressing Towards Goal  Note: Fall Risk Interventions:  Mobility Interventions: Assess mobility with egress test, Communicate number of staff needed for ambulation/transfer, OT consult for ADLs, Patient to call before getting OOB, PT Consult for mobility concerns, PT Consult for assist device competence, Strengthening exercises (ROM-active/passive), Utilize walker, cane, or other assistive device, Utilize gait belt for transfers/ambulation    Mentation Interventions: Adequate sleep, hydration, pain control, Door open when patient unattended, Evaluate medications/consider consulting pharmacy, Gait belt with transfers/ambulation, Increase mobility, More frequent rounding, Reorient patient, Room close to nurse's station, Toileting rounds, Update white board    Medication Interventions: Evaluate medications/consider consulting pharmacy, Patient to call before getting OOB, Teach patient to arise slowly, Utilize gait belt for transfers/ambulation    Elimination Interventions: Call light in reach, Toileting schedule/hourly rounds, Patient to call for help with toileting needs              Problem: Patient Education: Go to Patient Education Activity  Goal: Patient/Family Education  Outcome: Progressing Towards Goal     Problem: Pressure Injury - Risk of  Goal: *Prevention of pressure injury  Description: Document Gaston Scale and appropriate interventions in the flowsheet.   Outcome: Progressing Towards Goal  Note: Pressure Injury Interventions:  Sensory Interventions: Assess changes in LOC, Assess need for specialty bed, Avoid rigorous massage over bony prominences, Check visual cues for pain, Discuss PT/OT consult with provider, Float heels, Keep linens dry and wrinkle-free, Maintain/enhance activity level, Minimize linen layers, Monitor skin under medical devices, Pad Pt admitted with CVA. Alert, oriented x1 at first assessment, x3 at 1200 assessment. VSS on RA. Neuro with word finding difficulty, slight RUE weakness (4/5) with intermittent pronator drift, and slight R sided facial droop. CMS intact. Lung sounds clear. Voiding without difficulty. Ambulating with SBA and GB. Went for cerebral angiogram with possible stent placement at 1320. Spouse at the bedside, accompanied pt to IR and had pt's belongings with her.                       between skin to skin, Pressure redistribution bed/mattress (bed type), Sit a 90-degree angle/use footstool if needed, Turn and reposition approx. every two hours (pillows and wedges if needed)    Moisture Interventions: Absorbent underpads, Apply protective barrier, creams and emollients, Assess need for specialty bed, Check for incontinence Q2 hours and as needed, Internal/External urinary devices, Maintain skin hydration (lotion/cream), Minimize layers, Moisture barrier, Offer toileting Q_hr    Activity Interventions: Assess need for specialty bed, Increase time out of bed, Pressure redistribution bed/mattress(bed type), PT/OT evaluation    Mobility Interventions: Assess need for specialty bed, Float heels, HOB 30 degrees or less, Pressure redistribution bed/mattress (bed type), PT/OT evaluation, Turn and reposition approx.  every two hours(pillow and wedges)    Nutrition Interventions: Document food/fluid/supplement intake, Discuss nutritional consult with provider, Offer support with meals,snacks and hydration    Friction and Shear Interventions: Apply protective barrier, creams and emollients, Feet elevated on foot rest, Foam dressings/transparent film/skin sealants, Lift sheet, Lift team/patient mobility team, Minimize layers, Transfer aides:transfer board/Soheila lift/ceiling lift, Transferring/repositioning devices                Problem: Patient Education: Go to Patient Education Activity  Goal: Patient/Family Education  Outcome: Progressing Towards Goal     Problem: Breathing Pattern - Ineffective  Goal: *Absence of hypoxia  Outcome: Progressing Towards Goal  Goal: *Use of effective breathing techniques  Outcome: Progressing Towards Goal     Problem: Patient Education: Go to Patient Education Activity  Goal: Patient/Family Education  Outcome: Progressing Towards Goal     Problem: Patient Education: Go to Patient Education Activity  Goal: Patient/Family Education  Outcome: Progressing Towards Goal

## 2022-04-21 ENCOUNTER — APPOINTMENT (OUTPATIENT)
Dept: GENERAL RADIOLOGY | Age: 62
DRG: 291 | End: 2022-04-21
Attending: EMERGENCY MEDICINE
Payer: MEDICARE

## 2022-04-21 ENCOUNTER — HOSPITAL ENCOUNTER (INPATIENT)
Age: 62
LOS: 7 days | Discharge: HOME HEALTH | DRG: 291 | End: 2022-04-28
Attending: EMERGENCY MEDICINE | Admitting: HOSPITALIST
Payer: MEDICARE

## 2022-04-21 DIAGNOSIS — J44.1 COPD EXACERBATION (HCC): ICD-10-CM

## 2022-04-21 DIAGNOSIS — R06.02 SOB (SHORTNESS OF BREATH): ICD-10-CM

## 2022-04-21 DIAGNOSIS — R53.81 PHYSICAL DECONDITIONING: ICD-10-CM

## 2022-04-21 DIAGNOSIS — J81.0 ACUTE PULMONARY EDEMA (HCC): Primary | ICD-10-CM

## 2022-04-21 DIAGNOSIS — R09.02 HYPOXIA: ICD-10-CM

## 2022-04-21 DIAGNOSIS — J96.01 ACUTE RESPIRATORY FAILURE WITH HYPOXIA (HCC): ICD-10-CM

## 2022-04-21 PROBLEM — Z99.81 DEPENDENCE ON SUPPLEMENTAL OXYGEN: Status: ACTIVE | Noted: 2022-04-21

## 2022-04-21 PROBLEM — I50.9 HEART FAILURE (HCC): Status: ACTIVE | Noted: 2022-04-21

## 2022-04-21 PROBLEM — E78.6 LIPOPROTEIN DEFICIENCY DISORDER: Status: ACTIVE | Noted: 2022-04-21

## 2022-04-21 PROBLEM — G89.29 CHRONIC PAIN: Status: ACTIVE | Noted: 2022-04-21

## 2022-04-21 PROBLEM — I50.9 CHF EXACERBATION (HCC): Status: ACTIVE | Noted: 2022-01-01

## 2022-04-21 PROBLEM — M54.30 SCIATICA: Status: ACTIVE | Noted: 2022-04-21

## 2022-04-21 PROBLEM — I10 ESSENTIAL HYPERTENSION: Status: ACTIVE | Noted: 2022-04-21

## 2022-04-21 LAB
ALBUMIN SERPL-MCNC: 2.4 G/DL (ref 3.5–5)
ALBUMIN/GLOB SERPL: 0.4 {RATIO} (ref 1.1–2.2)
ALP SERPL-CCNC: 145 U/L (ref 45–117)
ALT SERPL-CCNC: 30 U/L (ref 12–78)
ANION GAP SERPL CALC-SCNC: 3 MMOL/L (ref 5–15)
APPEARANCE UR: ABNORMAL
ARTERIAL PATENCY WRIST A: YES
AST SERPL-CCNC: 85 U/L (ref 15–37)
BACTERIA URNS QL MICRO: ABNORMAL /HPF
BASE EXCESS BLDA CALC-SCNC: 7.8 MMOL/L
BASOPHILS # BLD: 0 K/UL (ref 0–0.1)
BASOPHILS NFR BLD: 0 % (ref 0–1)
BDY SITE: ABNORMAL
BILIRUB SERPL-MCNC: 0.4 MG/DL (ref 0.2–1)
BILIRUB UR QL: NEGATIVE
BNP SERPL-MCNC: 185 PG/ML
BUN SERPL-MCNC: 14 MG/DL (ref 6–20)
BUN/CREAT SERPL: 25 (ref 12–20)
CALCIUM SERPL-MCNC: 9.3 MG/DL (ref 8.5–10.1)
CHLORIDE SERPL-SCNC: 97 MMOL/L (ref 97–108)
CO2 SERPL-SCNC: 36 MMOL/L (ref 21–32)
COLOR UR: ABNORMAL
COMMENT, HOLDF: NORMAL
COMMENT, HOLDF: NORMAL
CREAT SERPL-MCNC: 0.57 MG/DL (ref 0.55–1.02)
DIFFERENTIAL METHOD BLD: ABNORMAL
EOSINOPHIL # BLD: 0.2 K/UL (ref 0–0.4)
EOSINOPHIL NFR BLD: 2 % (ref 0–7)
EPITH CASTS URNS QL MICRO: ABNORMAL /LPF
ERYTHROCYTE [DISTWIDTH] IN BLOOD BY AUTOMATED COUNT: 15.5 % (ref 11.5–14.5)
EST. AVERAGE GLUCOSE BLD GHB EST-MCNC: 108 MG/DL
GLOBULIN SER CALC-MCNC: 5.8 G/DL (ref 2–4)
GLUCOSE BLD STRIP.AUTO-MCNC: 120 MG/DL (ref 65–117)
GLUCOSE BLD STRIP.AUTO-MCNC: 137 MG/DL (ref 65–117)
GLUCOSE BLD STRIP.AUTO-MCNC: 244 MG/DL (ref 65–117)
GLUCOSE SERPL-MCNC: 187 MG/DL (ref 65–100)
GLUCOSE UR STRIP.AUTO-MCNC: NEGATIVE MG/DL
HBA1C MFR BLD: 5.4 % (ref 4–5.6)
HCO3 BLDA-SCNC: 36 MMOL/L (ref 22–26)
HCT VFR BLD AUTO: 46.2 % (ref 35–47)
HGB BLD-MCNC: 13.4 G/DL (ref 11.5–16)
HGB UR QL STRIP: NEGATIVE
IMM GRANULOCYTES # BLD AUTO: 0.2 K/UL (ref 0–0.04)
IMM GRANULOCYTES NFR BLD AUTO: 2 % (ref 0–0.5)
KETONES UR QL STRIP.AUTO: NEGATIVE MG/DL
LEUKOCYTE ESTERASE UR QL STRIP.AUTO: ABNORMAL
LYMPHOCYTES # BLD: 1.9 K/UL (ref 0.8–3.5)
LYMPHOCYTES NFR BLD: 17 % (ref 12–49)
MCH RBC QN AUTO: 30.7 PG (ref 26–34)
MCHC RBC AUTO-ENTMCNC: 29 G/DL (ref 30–36.5)
MCV RBC AUTO: 106 FL (ref 80–99)
MONOCYTES # BLD: 0.5 K/UL (ref 0–1)
MONOCYTES NFR BLD: 5 % (ref 5–13)
NEUTS SEG # BLD: 8.3 K/UL (ref 1.8–8)
NEUTS SEG NFR BLD: 74 % (ref 32–75)
NITRITE UR QL STRIP.AUTO: POSITIVE
NRBC # BLD: 0 K/UL (ref 0–0.01)
NRBC BLD-RTO: 0 PER 100 WBC
PCO2 BLDA: 64 MMHG (ref 35–45)
PH BLDA: 7.37 [PH] (ref 7.35–7.45)
PH UR STRIP: 6 [PH] (ref 5–8)
PLATELET # BLD AUTO: 214 K/UL (ref 150–400)
PMV BLD AUTO: 12 FL (ref 8.9–12.9)
PO2 BLDA: 79 MMHG (ref 80–100)
POTASSIUM SERPL-SCNC: 4.8 MMOL/L (ref 3.5–5.1)
PROT SERPL-MCNC: 8.2 G/DL (ref 6.4–8.2)
PROT UR STRIP-MCNC: NEGATIVE MG/DL
RBC # BLD AUTO: 4.36 M/UL (ref 3.8–5.2)
RBC #/AREA URNS HPF: ABNORMAL /HPF (ref 0–5)
SAMPLES BEING HELD,HOLD: NORMAL
SAMPLES BEING HELD,HOLD: NORMAL
SAO2 % BLD: 95 % (ref 92–97)
SAO2% DEVICE SAO2% SENSOR NAME: ABNORMAL
SERVICE CMNT-IMP: ABNORMAL
SODIUM SERPL-SCNC: 136 MMOL/L (ref 136–145)
SP GR UR REFRACTOMETRY: 1.02 (ref 1–1.03)
SPECIMEN SITE: ABNORMAL
TROPONIN-HIGH SENSITIVITY: 12 NG/L (ref 0–51)
TROPONIN-HIGH SENSITIVITY: 24 NG/L (ref 0–51)
UA: UC IF INDICATED,UAUC: ABNORMAL
UROBILINOGEN UR QL STRIP.AUTO: 0.2 EU/DL (ref 0.2–1)
WBC # BLD AUTO: 11.1 K/UL (ref 3.6–11)
WBC URNS QL MICRO: ABNORMAL /HPF (ref 0–4)

## 2022-04-21 PROCEDURE — 74011250637 HC RX REV CODE- 250/637: Performed by: INTERNAL MEDICINE

## 2022-04-21 PROCEDURE — 84484 ASSAY OF TROPONIN QUANT: CPT

## 2022-04-21 PROCEDURE — 82962 GLUCOSE BLOOD TEST: CPT

## 2022-04-21 PROCEDURE — 94640 AIRWAY INHALATION TREATMENT: CPT

## 2022-04-21 PROCEDURE — 5A09557 ASSISTANCE WITH RESPIRATORY VENTILATION, GREATER THAN 96 CONSECUTIVE HOURS, CONTINUOUS POSITIVE AIRWAY PRESSURE: ICD-10-PCS | Performed by: INTERNAL MEDICINE

## 2022-04-21 PROCEDURE — 74011000258 HC RX REV CODE- 258: Performed by: HOSPITALIST

## 2022-04-21 PROCEDURE — 2709999900 HC NON-CHARGEABLE SUPPLY

## 2022-04-21 PROCEDURE — 85025 COMPLETE CBC W/AUTO DIFF WBC: CPT

## 2022-04-21 PROCEDURE — 65270000029 HC RM PRIVATE

## 2022-04-21 PROCEDURE — 80053 COMPREHEN METABOLIC PANEL: CPT

## 2022-04-21 PROCEDURE — 83880 ASSAY OF NATRIURETIC PEPTIDE: CPT

## 2022-04-21 PROCEDURE — 93005 ELECTROCARDIOGRAM TRACING: CPT

## 2022-04-21 PROCEDURE — 87086 URINE CULTURE/COLONY COUNT: CPT

## 2022-04-21 PROCEDURE — 77010033678 HC OXYGEN DAILY

## 2022-04-21 PROCEDURE — 99285 EMERGENCY DEPT VISIT HI MDM: CPT

## 2022-04-21 PROCEDURE — 96374 THER/PROPH/DIAG INJ IV PUSH: CPT

## 2022-04-21 PROCEDURE — 36600 WITHDRAWAL OF ARTERIAL BLOOD: CPT

## 2022-04-21 PROCEDURE — 82803 BLOOD GASES ANY COMBINATION: CPT

## 2022-04-21 PROCEDURE — 99223 1ST HOSP IP/OBS HIGH 75: CPT | Performed by: INTERNAL MEDICINE

## 2022-04-21 PROCEDURE — 81001 URINALYSIS AUTO W/SCOPE: CPT

## 2022-04-21 PROCEDURE — 77030040392 HC DRSG OPTIFOAM MDII -A

## 2022-04-21 PROCEDURE — 74011000250 HC RX REV CODE- 250: Performed by: EMERGENCY MEDICINE

## 2022-04-21 PROCEDURE — 74011250636 HC RX REV CODE- 250/636: Performed by: HOSPITALIST

## 2022-04-21 PROCEDURE — 71045 X-RAY EXAM CHEST 1 VIEW: CPT

## 2022-04-21 PROCEDURE — 74011636637 HC RX REV CODE- 636/637: Performed by: HOSPITALIST

## 2022-04-21 PROCEDURE — 99284 EMERGENCY DEPT VISIT MOD MDM: CPT

## 2022-04-21 PROCEDURE — 74011250637 HC RX REV CODE- 250/637: Performed by: EMERGENCY MEDICINE

## 2022-04-21 PROCEDURE — 36415 COLL VENOUS BLD VENIPUNCTURE: CPT

## 2022-04-21 PROCEDURE — 74011000250 HC RX REV CODE- 250: Performed by: HOSPITALIST

## 2022-04-21 PROCEDURE — 94762 N-INVAS EAR/PLS OXIMTRY CONT: CPT

## 2022-04-21 PROCEDURE — 74011250637 HC RX REV CODE- 250/637: Performed by: HOSPITALIST

## 2022-04-21 PROCEDURE — 83036 HEMOGLOBIN GLYCOSYLATED A1C: CPT

## 2022-04-21 RX ORDER — ACETAMINOPHEN 325 MG/1
650 TABLET ORAL
Status: COMPLETED | OUTPATIENT
Start: 2022-04-21 | End: 2022-04-21

## 2022-04-21 RX ORDER — GUAIFENESIN 100 MG/5ML
81 LIQUID (ML) ORAL DAILY
Status: DISCONTINUED | OUTPATIENT
Start: 2022-04-21 | End: 2022-04-28 | Stop reason: HOSPADM

## 2022-04-21 RX ORDER — HYDRALAZINE HYDROCHLORIDE 20 MG/ML
20 INJECTION INTRAMUSCULAR; INTRAVENOUS
Status: DISCONTINUED | OUTPATIENT
Start: 2022-04-21 | End: 2022-04-28 | Stop reason: HOSPADM

## 2022-04-21 RX ORDER — POLYETHYLENE GLYCOL 3350 17 G/17G
17 POWDER, FOR SOLUTION ORAL DAILY PRN
Status: DISCONTINUED | OUTPATIENT
Start: 2022-04-21 | End: 2022-04-28 | Stop reason: HOSPADM

## 2022-04-21 RX ORDER — IPRATROPIUM BROMIDE AND ALBUTEROL SULFATE 2.5; .5 MG/3ML; MG/3ML
3 SOLUTION RESPIRATORY (INHALATION)
Status: DISCONTINUED | OUTPATIENT
Start: 2022-04-21 | End: 2022-04-23

## 2022-04-21 RX ORDER — SODIUM CHLORIDE 0.9 % (FLUSH) 0.9 %
5-40 SYRINGE (ML) INJECTION EVERY 8 HOURS
Status: DISCONTINUED | OUTPATIENT
Start: 2022-04-21 | End: 2022-04-21

## 2022-04-21 RX ORDER — ATORVASTATIN CALCIUM 10 MG/1
10 TABLET, FILM COATED ORAL
Status: DISCONTINUED | OUTPATIENT
Start: 2022-04-21 | End: 2022-04-28 | Stop reason: HOSPADM

## 2022-04-21 RX ORDER — OXYCODONE AND ACETAMINOPHEN 5; 325 MG/1; MG/1
1 TABLET ORAL
Status: DISCONTINUED | OUTPATIENT
Start: 2022-04-21 | End: 2022-04-22

## 2022-04-21 RX ORDER — SODIUM CHLORIDE 0.9 % (FLUSH) 0.9 %
5-40 SYRINGE (ML) INJECTION EVERY 8 HOURS
Status: DISCONTINUED | OUTPATIENT
Start: 2022-04-21 | End: 2022-04-28 | Stop reason: HOSPADM

## 2022-04-21 RX ORDER — ACETAMINOPHEN 325 MG/1
650 TABLET ORAL
Status: DISCONTINUED | OUTPATIENT
Start: 2022-04-21 | End: 2022-04-28 | Stop reason: HOSPADM

## 2022-04-21 RX ORDER — METOPROLOL TARTRATE 25 MG/1
25 TABLET, FILM COATED ORAL 2 TIMES DAILY
Status: DISCONTINUED | OUTPATIENT
Start: 2022-04-21 | End: 2022-04-27

## 2022-04-21 RX ORDER — ENOXAPARIN SODIUM 100 MG/ML
40 INJECTION SUBCUTANEOUS DAILY
Status: DISCONTINUED | OUTPATIENT
Start: 2022-04-21 | End: 2022-04-23

## 2022-04-21 RX ORDER — ALBUTEROL SULFATE 0.83 MG/ML
2.5 SOLUTION RESPIRATORY (INHALATION)
Status: DISCONTINUED | OUTPATIENT
Start: 2022-04-21 | End: 2022-04-28 | Stop reason: HOSPADM

## 2022-04-21 RX ORDER — ONDANSETRON 2 MG/ML
4 INJECTION INTRAMUSCULAR; INTRAVENOUS
Status: DISCONTINUED | OUTPATIENT
Start: 2022-04-21 | End: 2022-04-28 | Stop reason: HOSPADM

## 2022-04-21 RX ORDER — SODIUM CHLORIDE 0.9 % (FLUSH) 0.9 %
5-40 SYRINGE (ML) INJECTION AS NEEDED
Status: DISCONTINUED | OUTPATIENT
Start: 2022-04-21 | End: 2022-04-28 | Stop reason: HOSPADM

## 2022-04-21 RX ORDER — MAGNESIUM SULFATE 100 %
4 CRYSTALS MISCELLANEOUS AS NEEDED
Status: DISCONTINUED | OUTPATIENT
Start: 2022-04-21 | End: 2022-04-28 | Stop reason: HOSPADM

## 2022-04-21 RX ORDER — AMMONIUM LACTATE 12 G/100G
LOTION TOPICAL 2 TIMES DAILY
Status: DISCONTINUED | OUTPATIENT
Start: 2022-04-21 | End: 2022-04-27

## 2022-04-21 RX ORDER — MICONAZOLE NITRATE 20 MG/G
CREAM TOPICAL 2 TIMES DAILY
Status: DISCONTINUED | OUTPATIENT
Start: 2022-04-21 | End: 2022-04-28 | Stop reason: HOSPADM

## 2022-04-21 RX ORDER — BALSAM PERU/CASTOR OIL
OINTMENT (GRAM) TOPICAL 2 TIMES DAILY
Status: DISCONTINUED | OUTPATIENT
Start: 2022-04-21 | End: 2022-04-28 | Stop reason: HOSPADM

## 2022-04-21 RX ORDER — INSULIN LISPRO 100 [IU]/ML
INJECTION, SOLUTION INTRAVENOUS; SUBCUTANEOUS
Status: DISCONTINUED | OUTPATIENT
Start: 2022-04-21 | End: 2022-04-28 | Stop reason: HOSPADM

## 2022-04-21 RX ORDER — ONDANSETRON 4 MG/1
4 TABLET, ORALLY DISINTEGRATING ORAL
Status: DISCONTINUED | OUTPATIENT
Start: 2022-04-21 | End: 2022-04-28 | Stop reason: HOSPADM

## 2022-04-21 RX ORDER — SODIUM CHLORIDE 0.9 % (FLUSH) 0.9 %
5-40 SYRINGE (ML) INJECTION AS NEEDED
Status: DISCONTINUED | OUTPATIENT
Start: 2022-04-21 | End: 2022-04-21

## 2022-04-21 RX ORDER — DEXTROSE MONOHYDRATE 100 MG/ML
0-250 INJECTION, SOLUTION INTRAVENOUS AS NEEDED
Status: DISCONTINUED | OUTPATIENT
Start: 2022-04-21 | End: 2022-04-28 | Stop reason: HOSPADM

## 2022-04-21 RX ORDER — BUMETANIDE 0.25 MG/ML
1 INJECTION INTRAMUSCULAR; INTRAVENOUS ONCE
Status: COMPLETED | OUTPATIENT
Start: 2022-04-21 | End: 2022-04-21

## 2022-04-21 RX ORDER — FUROSEMIDE 10 MG/ML
40 INJECTION INTRAMUSCULAR; INTRAVENOUS 2 TIMES DAILY
Status: DISCONTINUED | OUTPATIENT
Start: 2022-04-21 | End: 2022-04-24

## 2022-04-21 RX ORDER — ACETAMINOPHEN 650 MG/1
650 SUPPOSITORY RECTAL
Status: DISCONTINUED | OUTPATIENT
Start: 2022-04-21 | End: 2022-04-28 | Stop reason: HOSPADM

## 2022-04-21 RX ADMIN — ASPIRIN 81 MG: 81 TABLET, CHEWABLE ORAL at 10:58

## 2022-04-21 RX ADMIN — FUROSEMIDE 40 MG: 10 INJECTION, SOLUTION INTRAMUSCULAR; INTRAVENOUS at 18:15

## 2022-04-21 RX ADMIN — SODIUM CHLORIDE, PRESERVATIVE FREE 10 ML: 5 INJECTION INTRAVENOUS at 01:10

## 2022-04-21 RX ADMIN — IPRATROPIUM BROMIDE AND ALBUTEROL SULFATE 3 ML: .5; 3 SOLUTION RESPIRATORY (INHALATION) at 07:35

## 2022-04-21 RX ADMIN — OXYCODONE HYDROCHLORIDE AND ACETAMINOPHEN 1 TABLET: 5; 325 TABLET ORAL at 10:59

## 2022-04-21 RX ADMIN — IPRATROPIUM BROMIDE AND ALBUTEROL SULFATE 3 ML: .5; 3 SOLUTION RESPIRATORY (INHALATION) at 15:37

## 2022-04-21 RX ADMIN — DOXYCYCLINE 100 MG: 100 INJECTION, POWDER, LYOPHILIZED, FOR SOLUTION INTRAVENOUS at 22:01

## 2022-04-21 RX ADMIN — SODIUM CHLORIDE, PRESERVATIVE FREE 10 ML: 5 INJECTION INTRAVENOUS at 22:15

## 2022-04-21 RX ADMIN — FUROSEMIDE 40 MG: 10 INJECTION, SOLUTION INTRAMUSCULAR; INTRAVENOUS at 10:59

## 2022-04-21 RX ADMIN — ARFORMOTEROL TARTRATE: 15 SOLUTION RESPIRATORY (INHALATION) at 20:39

## 2022-04-21 RX ADMIN — CASTOR OIL AND BALSAM, PERU: 788; 87 OINTMENT TOPICAL at 22:14

## 2022-04-21 RX ADMIN — IPRATROPIUM BROMIDE AND ALBUTEROL SULFATE 3 ML: .5; 3 SOLUTION RESPIRATORY (INHALATION) at 20:39

## 2022-04-21 RX ADMIN — ATORVASTATIN CALCIUM 10 MG: 10 TABLET, FILM COATED ORAL at 22:01

## 2022-04-21 RX ADMIN — OXYCODONE HYDROCHLORIDE AND ACETAMINOPHEN 1 TABLET: 5; 325 TABLET ORAL at 18:24

## 2022-04-21 RX ADMIN — MICONAZOLE NITRATE: 20 CREAM TOPICAL at 22:15

## 2022-04-21 RX ADMIN — METHYLPREDNISOLONE SODIUM SUCCINATE 40 MG: 40 INJECTION, POWDER, FOR SOLUTION INTRAMUSCULAR; INTRAVENOUS at 22:01

## 2022-04-21 RX ADMIN — ARFORMOTEROL TARTRATE: 15 SOLUTION RESPIRATORY (INHALATION) at 07:45

## 2022-04-21 RX ADMIN — METHYLPREDNISOLONE SODIUM SUCCINATE 40 MG: 40 INJECTION, POWDER, FOR SOLUTION INTRAMUSCULAR; INTRAVENOUS at 06:09

## 2022-04-21 RX ADMIN — ACETAMINOPHEN 325MG 650 MG: 325 TABLET ORAL at 04:15

## 2022-04-21 RX ADMIN — METOPROLOL TARTRATE 25 MG: 25 TABLET, FILM COATED ORAL at 10:58

## 2022-04-21 RX ADMIN — SODIUM CHLORIDE, PRESERVATIVE FREE 10 ML: 5 INJECTION INTRAVENOUS at 11:04

## 2022-04-21 RX ADMIN — IPRATROPIUM BROMIDE AND ALBUTEROL SULFATE 3 ML: .5; 3 SOLUTION RESPIRATORY (INHALATION) at 11:26

## 2022-04-21 RX ADMIN — BUMETANIDE 1 MG: 0.25 INJECTION, SOLUTION INTRAMUSCULAR; INTRAVENOUS at 04:15

## 2022-04-21 RX ADMIN — METOPROLOL TARTRATE 25 MG: 25 TABLET, FILM COATED ORAL at 18:15

## 2022-04-21 RX ADMIN — ENOXAPARIN SODIUM 40 MG: 40 INJECTION SUBCUTANEOUS at 10:59

## 2022-04-21 RX ADMIN — Medication 3 UNITS: at 12:11

## 2022-04-21 NOTE — PROGRESS NOTES
Orders received, chart reviewed and discussed PT whom attended rounds on the unit pt is currently on. Confirmed that pt is bedbound at baseline for over two years, is unable to turn herself and needs assist with ADLS. OT assessment is not needed as pt is at her baseline. Will sign off.

## 2022-04-21 NOTE — PROGRESS NOTES
Patient admitted to unit but was transferred to [facility] due to respiratory needs. Report given to accepting RN and my phone number provided for any additonal questions she may have.

## 2022-04-21 NOTE — ED NOTES
Pt informed RN that she does not walk at home due to her \"knees being bad\". She states that her daughter does \"all the care\" for her. She also states that she normally wears 4L O2 via NC at home and averages about 96% with that flow. She denies any other complaints at this time other being hungry. RN informed pt that breakfast should be arriving shortly. Call bell is within reach.

## 2022-04-21 NOTE — PROGRESS NOTES
This patient was admitted earlier this morning for acute respiratory distress due to CHF exacerbation and pulmonary edema.   Continue current management as per HPI

## 2022-04-21 NOTE — H&P
Hospitalist Admission Note    NAME: Shanti Childs   :  1960   MRN:  [id number]     Date/Time:  2022 4:39 AM    Patient PCP: Unknown, Provider, MD  _____________________________________________________________________  Given the patient's current clinical presentation, I have a high level of concern for decompensation if discharged from the emergency department. Complex decision making was performed, which includes reviewing the patient's available past medical records, laboratory results, and x-ray films. My assessment of this patient's clinical condition and my plan of care is as follows. Assessment / Plan:  Acute respiratory distress  Hypoxic respiratory failure saturating 87% on room air   currently requiring 3 L nasal cannula saturating 94%  CHF exacerbation  Pulmonary edema  Bilateral pleural effusion  Physical deconditioning  CXR Moderately severe pulmonary edema, right upper lobe atelectasis, and  small bilateral pleural effusions. Admit to hospitalist service, IV diuretic, CHF pathway, strict intake output record, daily weight, 2 g sodium diet, check 2D echocardiogram, oxygen supplementation as needed      History of diabetes mellitus  Hypertension  Hypercholesterolemia  A. Fib  COPD      Code Status:   Surrogate Decision Maker:    DVT Prophylaxis:   GI Prophylaxis: not indicated    Baseline:         Subjective:   CHIEF COMPLAINT: Respiratory distress    HISTORY OF PRESENT ILLNESS:      Shanti Childs is a 57 y.o. female, with significant pmhx of diabetes, hypertension, cholesterol, CHF, atrial fibrillation, COPD, who presents via  EMS to the ED with c/o shortness of breath. EMS reported patient was satting at 88% on her home oxygen (3 L.)  Reports having rapid progression of shortness of breath with chest tightness since earlier in the evening. Per report patient is bedbound for some time now.   Per EMS report patient home was in disarray and patient may need to PT OT and  consult to assess home situation. Patient denies fever chills productive cough. No COVID symptoms. Denies sick contact. We were asked to admit for work up and evaluation of the above problems. Vital Signs-Reviewed the patient's vital signs. Patient Vitals for the past 12 hrs:    Temp Pulse Resp BP SpO2   04/21/22 0035 99.5 °F (37.5 °C) (!) 110 (!) 44 (!) 186/88 95 %       Past Medical History:   Diagnosis Date    Acute on chronic respiratory failure with hypoxia and hypercapnia (HCC) 11/20/2020    Atrial fibrillation ([facility] 11/4/2020    CHF (congestive [facility] failure) [facility]     Diabetes ([facility]     Hypertension     Ill-defined condition     high cholesterol    Ill-defined condition     tachycardia    JOSLYN (obstructive sleep apnea) 11/20/2020    Other and unspecified symptoms and signs involving general sensations and perceptions     ruptured disc    S/P cardiac cath 11/4/2020    11/3/2020 nonobstructive disease        Past Surgical History:   Procedure Laterality Date    MI COMPRE ELECTROPHYSIOL XM W/LEFT ATRIAL PACNG/REC N/A 11/23/2020    Lt Atrial Pace & Record During Ep Study performed by Paola Mcarthur MD at \[facility]\"" CARDIAC CATH LAB    MI COMPRE EP EVAL ABLTJ 3D MAPG TX SVT N/A 11/23/2020    ABLATION A-FLUTTER performed by Paola Mcarthur MD at [place] [id number]. ADD ON N/A 11/23/2020    Ablation Svt/Vt Add On performed by Paola Mcarthur MD at \[facility]\"" CARDIAC CATH LAB    MI INTRACARDIAC ELECTROPHYSIOLOGIC 3D MAPPING N/A 11/23/2020    Ep 3d Mapping performed by Paola Mcarthur MD at \[facility]\"" CARDIAC CATH LAB    MI STIM/PACING HEART POST IV DRUG INFU N/A 11/23/2020    Drug Stimulation performed by Paola Mcarthur MD at \[facility]\"" CARDIAC CATH LAB       Social History     Tobacco Use    Smoking status: Former Smoker    Smokeless tobacco: Never Used   Substance Use Topics    Alcohol use: No        History reviewed.  No pertinent family history. Allergies   Allergen Reactions    Cefazolin Rash    Other Plant, Animal, Environmental Rash     Ivory soap caused rash on hands. Prior to Admission medications    Medication Sig Start Date End Date Taking? Authorizing Provider   predniSONE (DELTASONE) 10 mg tablet Take 10 mg by mouth daily (with breakfast). 12/9/20   Isabel Pathak MD   albuterol-ipratropium (DUO-NEB) 2.5 mg-0.5 mg/3 ml nebu 3 mL by Nebulization route every four (4) hours as needed for Wheezing, Shortness of Breath or Respiratory Distress. 12/8/20   Isabel Pathak MD   fluticasone propion-salmeteroL (Advair Diskus) 250-50 mcg/dose diskus inhaler Take 1 Puff by inhalation every twelve (12) hours. 12/8/20   Isabel Pathak MD   tiotropium (Spiriva with HandiHaler) 18 mcg inhalation capsule Take 1 Cap by inhalation daily. 12/8/20   Isabel Pathak MD   Nebulizer & Compressor machine To use nebulizer as directed 12/8/20   Isabel Pathak MD   menthol-zinc oxide (Moisture Barrier Ointment) 0.44-20.6 % oint Apply 1 Each to affected area three (3) times daily. thin layer moisture associated lesion    Provider, Historical   aspirin 81 mg chewable tablet Take 1 Tab by mouth daily. 11/5/20   Kemi Velez MD   OXYGEN-AIR DELIVERY SYSTEMS 3 L/min by Nasal route continuous. 8/31/20   Lexus Pandya NP   acetaminophen (TYLENOL) 325 mg tablet Take 650 mg by mouth every six (6) hours as needed for Pain. Provider, Historical   furosemide (LASIX) 40 mg tablet Take 1 Tab by mouth daily. Patient taking differently: Take 40 mg by mouth daily. Take 1/2 tablet by mouth daily 8/7/20   Fer Walls MD   atorvastatin (LIPITOR) 10 mg tablet Take 10 mg by mouth nightly. Russ Cano MD   metoprolol (LOPRESSOR) 25 mg tablet Take 25 mg by mouth two (2) times a day. Russ Cano MD       REVIEW OF SYSTEMS:     I am not able to complete the review of systems because:    The patient is intubated and sedated    The patient has altered mental status due to his acute medical problems    The patient has baseline aphasia from prior stroke(s)    The patient has baseline dementia and is not reliable historian    The patient is in acute medical distress and unable to provide information       Constitutional: Negative. Negative for fever. Eyes: Negative. Respiratory: Positive for chest tightness and shortness of breath. Cardiovascular: Negative for chest pain. Gastrointestinal: Negative for abdominal pain, nausea and vomiting. Endocrine: Negative. Genitourinary: Negative. Negative for difficulty urinating, dysuria and hematuria. Musculoskeletal: Negative. Skin: Negative. Neurological: Negative. Psychiatric/Behavioral: Negative for suicidal ideas. All other systems reviewed and are negative. Objective:   VITALS:    Visit Vitals  /60   Pulse 95   Temp 99.5 °F (37.5 °C)   Resp 28   Ht 5' 3\" (1.6 m)   Wt (!) 174.6 kg (385 lb)   SpO2 (!) 87%   BMI 68.20 kg/m²       PHYSICAL EXAM:  Constitutional:       General: She is in acute distress. Appearance: She is well-developed. She is obese. She is not ill-appearing or diaphoretic. HENT:      Head: Normocephalic and atraumatic. Nose: Nose normal.      Mouth/Throat:      Mouth: Mucous membranes are moist.   Eyes:      General: No scleral icterus. Conjunctiva/sclera: Conjunctivae normal.   Neck:      Trachea: No tracheal deviation. Cardiovascular:      Rate and Rhythm: Regular rhythm. Tachycardia present. Heart sounds: Normal heart sounds. No murmur heard. No friction rub. Pulmonary:      Effort: Tachypnea, accessory muscle usage and respiratory distress present. Breath sounds: Normal breath sounds. No stridor. No wheezing or rales. Abdominal:      General: Bowel sounds are normal. There is no distension. Palpations: Abdomen is soft. Tenderness: There is no abdominal tenderness. There is no rebound.    Musculoskeletal: General: No tenderness. Normal range of motion. Cervical back: Normal range of motion. Skin:     General: Skin is warm and dry. Findings: No rash. Neurological:      Mental Status: She is alert and oriented to person, place, and time. Cranial Nerves: No cranial nerve deficit. Psychiatric:         Speech: Speech normal.         Behavior: Behavior normal.         Thought Content: Thought content normal.         Judgment: Judgment normal.          _______________________________________________________________________  Care Plan discussed with:    Comments   Patient y    Family      RN y    Care Manager                    Consultant:  kevin Bower md   _______________________________________________________________________  Expected  Disposition:   Home with Family y   HH/PT/OT/RN    SNF/LTC    JUAN M    ________________________________________________________________________  TOTAL TIME:   72 Minutes    Critical Care Provided     Minutes non procedure based      Comments    x Reviewed previous records   >50% of visit spent in counseling and coordination of care x Discussion with patient and/or family and questions answered       Given the patient's current clinical presentation, I have a high level of concern for decompensation if discharged from the ED. Complex decision making was performed which includes reviewing the patient's available past medical records, laboratory results, and Xray films. I have also directly communicated my plan and discussed this case with the involved ED physician.     ____________________________________________________________________  Valerie Marte MD    Procedures: see electronic medical records for all procedures/Xrays and details which were not copied into this note but were reviewed prior to creation of Plan.     LAB DATA REVIEWED:    Recent Results (from the past 24 hour(s))   METABOLIC PANEL, COMPREHENSIVE    Collection Time: 04/21/22  1:10 AM   Result Value Ref Range Sodium 136 136 - 145 mmol/L    Potassium 4.8 3.5 - 5.1 mmol/L    Chloride 97 97 - 108 mmol/L    CO2 36 (H) 21 - 32 mmol/L    Anion gap 3 (L) 5 - 15 mmol/L    Glucose 187 (H) 65 - 100 mg/dL    BUN 14 6 - 20 MG/DL    Creatinine 0.57 0.55 - 1.02 MG/DL    BUN/Creatinine ratio 25 (H) 12 - 20      GFR est AA >60 >60 ml/min/1.73m2    GFR est non-AA >60 >60 ml/min/1.73m2    Calcium 9.3 8.5 - 10.1 MG/DL    Bilirubin, total 0.4 0.2 - 1.0 MG/DL    ALT (SGPT) 30 12 - 78 U/L    AST (SGOT) 85 (H) 15 - 37 U/L    Alk. phosphatase 145 (H) 45 - 117 U/L    Protein, total 8.2 6.4 - 8.2 g/dL    Albumin 2.4 (L) 3.5 - 5.0 g/dL    Globulin 5.8 (H) 2.0 - 4.0 g/dL    A-G Ratio 0.4 (L) 1.1 - 2.2     CBC WITH AUTOMATED DIFF    Collection Time: 04/21/22  1:10 AM   Result Value Ref Range    WBC 11.1 (H) 3.6 - 11.0 K/uL    RBC 4.36 3.80 - 5.20 M/uL    HGB 13.4 11.5 - 16.0 g/dL    HCT 46.2 35.0 - 47.0 %    .0 (H) 80.0 - 99.0 FL    MCH 30.7 26.0 - 34.0 PG    MCHC 29.0 (L) 30.0 - 36.5 g/dL    RDW 15.5 (H) 11.5 - 14.5 %    PLATELET 000 646 - 780 K/uL    MPV 12.0 8.9 - 12.9 FL    NRBC 0.0 0  WBC    ABSOLUTE NRBC 0.00 0.00 - 0.01 K/uL    NEUTROPHILS 74 32 - 75 %    LYMPHOCYTES 17 12 - 49 %    MONOCYTES 5 5 - 13 %    EOSINOPHILS 2 0 - 7 %    BASOPHILS 0 0 - 1 %    IMMATURE GRANULOCYTES 2 (H) 0.0 - 0.5 %    ABS. NEUTROPHILS 8.3 (H) 1.8 - 8.0 K/UL    ABS. LYMPHOCYTES 1.9 0.8 - 3.5 K/UL    ABS. MONOCYTES 0.5 0.0 - 1.0 K/UL    ABS. EOSINOPHILS 0.2 0.0 - 0.4 K/UL    ABS. BASOPHILS 0.0 0.0 - 0.1 K/UL    ABS. IMM. GRANS. 0.2 (H) 0.00 - 0.04 K/UL    DF AUTOMATED     SAMPLES BEING HELD    Collection Time: 04/21/22  1:10 AM   Result Value Ref Range    SAMPLES BEING HELD SST PST BL     COMMENT        Add-on orders for these samples will be processed based on acceptable specimen integrity and analyte stability, which may vary by analyte.    SAMPLES BEING HELD    Collection Time: 04/21/22  1:10 AM   Result Value Ref Range    SAMPLES BEING HELD PBC COMMENT        Add-on orders for these samples will be processed based on acceptable specimen integrity and analyte stability, which may vary by analyte.    TROPONIN-HIGH SENSITIVITY    Collection Time: 04/21/22  1:10 AM   Result Value Ref Range    Troponin-High Sensitivity 12 0 - 51 ng/L   NT-PRO BNP    Collection Time: 04/21/22  1:10 AM   Result Value Ref Range    NT pro- (H) <125 PG/ML

## 2022-04-21 NOTE — PROGRESS NOTES
Physical Therapy    Orders received, chart reviewed. Pt is noted in several places to be bedbound and not responding or continuing any therapy in past even when set up. Discussed with PT who attended rounds on the unit and confirmed that pt is bedbound at baseline for over two years, is unable to turn herself and needs assist with ADLS. PT assessment is not needed as pt is at her baseline. Will sign off.      Kristopher Loges, PT

## 2022-04-21 NOTE — WOUND CARE
Wound Care consult for the large Left Buttock wound and other significant skin issues:   57year old female with significant pmhx of diabetes, hypertension, cholesterol, CHF, A-Fib, COPD. Admitted for difficulty  breathing with chest tightness for 2 days. Pt. Is bed bound for about 2 years and she has a hospital bed at home. This is because of her knees. They are painful from bone on bone  She does not currently have home care. She lives at home with family and she stated that her daughter takes care of her. The ED physician reported that the pt. Has very poor hygiene. Details to follow. Assessment: Pt. Is alert and oriented and able to give most of the history. The right side of the body was examined and then the left and photos obtained and were shown to the patient by request. General surgery had to be called in order to take care of a bleeding left buttocks wound. The left buttocks wound appears to more from trauma but may have some pressure s/sx due to the rest of the skin on the left side being the same purple linear lesions (DTI) and some pinching skin areas. Unknown what kind of hospital bed the patient has but it may need to be exchanged for a newer one with an appropriate Baratric surface. Pt. Stated that she was pressed up against the left railing a lot. Pt. Did not know she had an open wound on her left buttock. Pt. Has chronic incontinence and chronic skin changes due to this. The skin of the buttocks and groin are a purple/ hyperpigmented hue (dyschromia).    See details below:   WOUND POA CONDITIONS    Wound Neck posterior Left;Right Fungal rash (Active)   Wound Etiology Other (Comment) 04/21/22 1655   Cleansed Soap and water 04/21/22 1655   Dressing/Treatment Open to air 04/21/22 1637   Wound Assessment Pink/red;Purple/maroon 04/21/22 1655   Drainage Amount Scant 04/21/22 1655   Drainage Description Thin 04/21/22 1655   Wound Odor Mild 04/21/22 1655   Wound Thickness Description Partial thickness 04/21/22 1655   Number of days: 0       Wound Spine Upper Healing pressure injury, unknown stage (Active)   Wound Image: Pt. Stated, \"I scratched myself because it itched\". Also has some pressure s/sx. 04/21/22 1655   Wound Etiology Traumatic 04/21/22 1655   Dressing Status New dressing applied 04/21/22 1655   Cleansed Soap and water 04/21/22 1655   Dressing/Treatment Pharmaceutical agent (see MAR) 04/21/22 1655   Wound Length (cm) 11 cm 04/21/22 1655   Wound Width (cm) 13 cm 04/21/22 1655   Wound Surface Area (cm^2) 143 cm^2 04/21/22 1655   Wound Assessment Dry;Epithelialization;Pink/red;Purple/maroon;Superficial;Other (Comment) 04/21/22 1655   Drainage Amount None 04/21/22 1655   Wound Odor None 04/21/22 1655   Bonnie-Wound/Incision Assessment Blanchable erythema 04/21/22 1655   Edges Defined edges 04/21/22 1655   Wound Thickness Description Partial thickness 04/21/22 1655   Number of days: 0       Wound Buttocks Left Bleeding wound left buttock, trauma v. pressure? (Active)   Wound Image: Close up of the wound with purple-maroon periwound skin. The wound appears to be ripped open and there was profuse bleeding when the wound was just cleansed. General surgery was called to suture this wound and we packed with Surgicel.    04/21/22 1655   Wound Etiology Traumatic 04/21/22 1655   Dressing Status New dressing applied 04/21/22 1655   Cleansed Wound cleanser 04/21/22 1655   Dressing/Treatment Packing; Foam 04/21/22 1655   Dressing Change Due 04/22/22 04/21/22 1655   Wound Length (cm) 7.5 cm 04/21/22 1655   Wound Width (cm) 3.8 cm 04/21/22 1655   Wound Depth (cm) 7 cm 04/21/22 1655   Wound Surface Area (cm^2) 28.5 cm^2 04/21/22 1655   Wound Volume (cm^3) 199.5 cm^3 04/21/22 1655   Wound Assessment Bleeding;Devitalized tissue;Dusky; Purple/maroon;Subcutaneous; Other (Comment) 04/21/22 1655   Drainage Amount Large 04/21/22 1655   Drainage Description Sanguineous 04/21/22 1655   Wound Odor None 04/21/22 1655 Bonnie-Wound/Incision Assessment Blanchable erythema;Denuded;Ecchymosis;Edematous;Fragile; Hyperpigmented 04/21/22 1655   Edges Defined edges 04/21/22 1655   Wound Thickness Description Full thickness 04/21/22 1655   Number of days: 0       Wound Back Left;Mid;Lower; Upper Scattered DTIs and pnching injuries (back/flank) (Active)   Wound Image   04/21/22 1655   Wound Etiology Deep Tissue/Injury 04/21/22 1655   Dressing Status New dressing applied 04/21/22 1655   Cleansed Soap and water 04/21/22 1655   Dressing/Treatment Pharmaceutical agent (see MAR) 04/21/22 1655   Dressing Change Due 04/21/22 04/21/22 1655   Wound Assessment Pink/red;Purple/maroon; Other (Comment) 04/21/22 1655   Drainage Amount None 04/21/22 1655   Wound Odor None 04/21/22 1655   Bonnie-Wound/Incision Assessment Edematous; Ecchymosis 04/21/22 1655   Number of days: 0       Wound Foot Left Very dry fungal growth on skin surface (good skin underneath) (Active)   Wound Image   04/21/22 1655   Wound Etiology Other (Comment) 04/21/22 1655   Cleansed Soap and water 04/21/22 1655   Wound Assessment Devitalized tissue;Dry;Superficial;Other (Comment) 04/21/22 1655   Drainage Amount None 04/21/22 1655   Wound Odor Mild 04/21/22 1655   Wound Thickness Description Partial thickness 04/21/22 1655   Number of days: 0       Wound Foot Right Very dry fungal growth (good skin underneath) (Active)   Wound Image   04/21/22 1655   Wound Etiology Other (Comment) 04/21/22 1655   Cleansed Soap and water 04/21/22 1655   Offloading for Diabetic Foot Ulcers Offloading ordered 04/21/22 1655   Wound Assessment Dry;Epithelialization; Other (Comment) 04/21/22 1655   Drainage Amount None 04/21/22 1655   Wound Odor Mild 04/21/22 1655   Wound Thickness Description Partial thickness 04/21/22 1655   Number of days: 0       Wound Sacrum Left;Mid;Right multiple DTIs lower back and scarum (Active)   Wound Image   04/21/22 1655   Wound Etiology Deep Tissue/Injury 04/21/22 1652   Cleansed Soap and water 04/21/22 1655   Dressing/Treatment Pharmaceutical agent (see MAR) 04/21/22 1655   Dressing Change Due 04/21/22 04/21/22 1655   Wound Assessment Purple/maroon; Other (Comment) 04/21/22 1655   Drainage Amount Scant 04/21/22 1655   Drainage Description Serosanguinous 04/21/22 1655   Wound Odor None 04/21/22 1655   Bonnie-Wound/Incision Assessment Edematous; Ecchymosis 04/21/22 1655   Edges Undefined edges 04/21/22 1655   Number of days: 0       Wound Abdomen Medial;Lower Bleeding fissures across pannus (Active)   Wound Etiology Other (Comment) 04/21/22 1655   Cleansed Soap and water 04/21/22 1655   Wound Assessment Bleeding;[place]/red;Superficial 04/21/22 1655   Drainage Amount Small 04/21/22 1655   Drainage Description Serosanguinous 04/21/22 1655   Wound Odor None 04/21/22 1655   Bonnie-Wound/Incision Assessment Maceration 04/21/22 1655   Wound Thickness Description Partial thickness 04/21/22 1655   Number of days: 0      Treatment: The patient was cleansed from neck to the toes with CHG wipes and the Dynahex soap and water. The wounds were treated as stated above. The feet  - dry skin was removed from some of the plantar surface in order to examine the skin underneath. The skin was pink under the dry cracked skin. The general surgeon, Dr. Floresita Diaz attempted to suture the skin to stop the bleeding in the left buttocks wound. We ended up having to use surgicel and packing to stop the bleeding. Covered with foam dressing. Plan: Wound care nurse will see this patient again tomorrow for the left buttocks wound again. Size [employer] Bed was ordered with a hercules lift sheet aparatus and 5 sheets. Float the heels and turn the patient every two hours.    Mary Duarte, RN, BSN, [employer]

## 2022-04-21 NOTE — PROGRESS NOTES
Pharmacy Medication Reconciliation     The patients daughter was interviewed regarding current PTA medication list, use and drug allergies. The patient daughter was questioned regarding use of any other inhalers, topical products, over the counter medications, herbal medications, vitamin products or ophthalmic/nasal/otic medication use. Allergy Update: Cefazolin and Other plant, animal, environmental    Recommendations/Findings: The following amendments were made to the patient's active medication list on file at [facility]:   1) Additions:  None     2) Deletions:   Aspirin   Fluticasone-Salmeterol  Menthol-zinc  Tiotropium      3) Changes:   None       Pertinent Findings:   Patients daughter stated that patient is not always compliant with her medications     Clarified PTA med list with patients daughter. PTA medication list was corrected to the following:     Prior to Admission Medications   Prescriptions Last Dose Informant Taking?   acetaminophen (TYLENOL) 325 mg tablet  Transfer Papers Yes   Sig: Take 650 mg by mouth every six (6) hours as needed for Pain. albuterol-ipratropium (DUO-NEB) 2.5 mg-0.5 mg/3 ml nebu   Yes   Sig: 3 mL by Nebulization route every four (4) hours as needed for Wheezing, Shortness of Breath or Respiratory Distress. atorvastatin (LIPITOR) 10 mg tablet  Transfer Papers Yes   Sig: Take 10 mg by mouth nightly. furosemide (LASIX) 40 mg tablet  Self Yes   Sig: Take 1 Tab by mouth daily. Patient taking differently: Take 20 mg by mouth daily. Takes prn when fluid overloaded   metoprolol (LOPRESSOR) 25 mg tablet  Transfer Papers Yes   Sig: Take 25 mg by mouth two (2) times a day.       Facility-Administered Medications: None        Thank you,  Kory Matt PharmD candidate class of 2022

## 2022-04-21 NOTE — H&P
Hospitalist Admission Note    NAME: Bravo Prasad   :  1960   MRN:  [id number]     Date/Time:  2022 4:39 AM    Patient PCP: Unknown, Provider, MD  _____________________________________________________________________  Given the patient's current clinical presentation, I have a high level of concern for decompensation if discharged from the emergency department. Complex decision making was performed, which includes reviewing the patient's available past medical records, laboratory results, and x-ray films. My assessment of this patient's clinical condition and my plan of care is as follows. Assessment / Plan:  Acute respiratory distress  Hypoxic respiratory failure saturating 87% on room air  currently on nonrebreather saturating 94%  CHF exacerbation  Pulmonary edema  Bilateral pleural effusion  Physical deconditioning  CXR Moderately severe pulmonary edema, right upper lobe atelectasis, and  small bilateral pleural effusions. Probable COPD exacerbation  Probable JOSLYN and OHS on CPAP at night  History of COPD on 4 L nasal cannula oxygen  at home at baseline  Morbid obesity   Impaired mobility          Admit to hospitalist service, IV diuretic, CHF pathway, strict intake output record, daily weight, 2 g sodium diet, check 2D echocardiogram, cardiology consulted, oxygen supplementation as needed, will start IV steroids for COPD and IV doxycycline for COPD exacerbation, continue Symbicort and DuoNeb 4 times daily and albuterol neb as needed, PT OT consulted,  consulted, will check ABG,        H/O paroxysmal A.  Fib  -Per patient she is not on any blood thinner and not taking aspirin because she getting nosebleeds with aspirin  -Multiple prior cardioversion procedures  -EKG pending       DM2  -does not appear to be on medications, A1C 6.0 2020  -since she will be on steroids, will add sliding scale, consider NPH if she becomes hyperglycemic  -We will check A1c level    Hypertension   uncontrolled  Hypercholesterolemia  Remote tobacco use     Morbid obesity  Impaired mobility  -reportedly mostly bedbound at home      Of note patient does not know her home medication list, pharmacy consulted, primary team to follow-up with pharmacy and family for medication reconciliation in the morning       Previous echocardiogram on July 2020  · LV: Normal cavity size and systolic function (ejection fraction normal). Mild concentric hypertrophy. Estimated left ventricular ejection fraction is 50 - 55%. Visually measured ejection fraction. Code Status: Full  Surrogate Decision Maker: Kim Villegas, daughter            Jud Trejo [phone]         DVT Prophylaxis:  Subcu Lovenox  GI Prophylaxis: not indicated     Baseline: Lives at home, mostly bedbound         Subjective:   CHIEF COMPLAINT: Respiratory distress    HISTORY OF PRESENT ILLNESS:      Rigo Villafuerte is a 57 y.o. female, with past medical history of chronic hypoxic and hypercapnic respiratory failure with suspected COPD on 4 L nasal cannula at baseline, probable JOSLYN and obesity hypoventilation syndrome on CPAP at night, morbid obesity, refractory A. fib, CHF with normal ejection fraction on Lasix at home 40 mg daily, DM2, HTN, and HLD  who presents via  EMS to the ED with c/o shortness of breath. EMS reported patient was satting at 88% on her home oxygen (3 L.)  Reports having rapid progression of shortness of breath with chest tightness since earlier in the evening. Per report patient is bedbound for some time now. Per EMS report patient home was in disarray and patient may need to PT OT and  consult to assess home situation. Patient denies fever chills productive cough. No COVID symptoms. Denies sick contact. We were asked to admit for work up and evaluation of the above problems. Vital Signs-Reviewed the patient's vital signs.   Patient Vitals for the past 12 hrs:    Temp Pulse Resp BP SpO2   04/21/22 0035 99.5 °F (37.5 °C) (!) 110 (!) 44 (!) 186/88 95 %       Admit date: 12/2/2020  Discharge date and time: 12/8/2020     DISCHARGE DIAGNOSIS:  Acute on chronic hypoxic and hypercapnic respiratory failure   Acute metabolic encephalopathy   Suspected COPD exacerbation       Past Medical History:   Diagnosis Date    Acute on chronic respiratory failure with hypoxia and hypercapnia (HCC) 11/20/2020    Atrial fibrillation ([facility] 11/4/2020    [facility]     Diabetes [facility]     Hypertension     Ill-defined condition     high cholesterol    Ill-defined condition     tachycardia    JOSLYN (obstructive sleep apnea) 11/20/2020    Other and unspecified symptoms and signs involving general sensations and perceptions     ruptured disc    S/P cardiac cath 11/4/2020    11/3/2020 nonobstructive disease        Past Surgical History:   Procedure Laterality Date    DC COMPRE ELECTROPHYSIOL XM W/LEFT ATRIAL PACNG/REC N/A 11/23/2020    Lt Atrial Pace & Record During Ep Study performed by Alyssa Amato MD at [facility] C[facility]    DC COMPRE EP EVAL ABLTJ 3D MAPG TX SVT N/A 11/23/2020    ABLATION A-FLUTTER performed by Alyssa Amato MD at [facility] CARDIAC CATH LAB    DC ICAR CATHETER ABLATION ARRHYTHMIA ADD ON N/A 11/23/2020    Ablation Svt/Vt Add On performed by Alyssa Amato MD at [facility] CARDIAC CATH [facility]    DC INTRACARDIAC ELECTROPHYSIOLOGIC 3D MAPPING N/A 11/23/2020    Ep 3d Mapping performed by Alyssa Amato MD at [facility] CARDIAC CATH LAB    DC STIM/PACING HEART POST IV DRUG INFU N/A 11/23/2020    Drug Stimulation performed by Alyssa Amato MD at [facility] CARDIAC CATH LAB       Social History     Tobacco Use    Smoking status: Former Smoker    Smokeless tobacco: Never Used   Substance Use Topics    Alcohol use: No        History reviewed. No pertinent family history.   Allergies   Allergen Reactions    Cefazolin Rash    Other Plant, Animal, Environmental Rash     Ivory soap caused rash on hands. Prior to Admission medications    Medication Sig Start Date End Date Taking? Authorizing Provider   predniSONE (DELTASONE) 10 mg tablet Take 10 mg by mouth daily (with breakfast). 12/9/20   Jayshree Patel MD   albuterol-ipratropium (DUO-NEB) 2.5 mg-0.5 mg/3 ml nebu 3 mL by Nebulization route every four (4) hours as needed for Wheezing, Shortness of Breath or Respiratory Distress. 12/8/20   Jayshree Patel MD   fluticasone propion-salmeteroL (Advair Diskus) 250-50 mcg/dose diskus inhaler Take 1 Puff by inhalation every twelve (12) hours. 12/8/20   Jayshree Patel MD   tiotropium (Spiriva with HandiHaler) 18 mcg inhalation capsule Take 1 Cap by inhalation daily. 12/8/20   Jayshree Patel MD   Nebulizer & Compressor machine To use nebulizer as directed 12/8/20   Jayshree Patel MD   menthol-zinc oxide (Moisture Barrier Ointment) 0.44-20.6 % oint Apply 1 Each to affected area three (3) times daily. thin layer moisture associated lesion    Provider, Historical   aspirin 81 mg chewable tablet Take 1 Tab by mouth daily. 11/5/20   Consuelo Dawson MD   OXYGEN-AIR DELIVERY SYSTEMS 3 L/min by Nasal route continuous. 8/31/20   Yves Segovia NP   acetaminophen (TYLENOL) 325 mg tablet Take 650 mg by mouth every six (6) hours as needed for Pain. Provider, Historical   furosemide (LASIX) 40 mg tablet Take 1 Tab by mouth daily. Patient taking differently: Take 40 mg by mouth daily. Take 1/2 tablet by mouth daily 8/7/20   John Quiroz MD   atorvastatin (LIPITOR) 10 mg tablet Take 10 mg by mouth nightly. Russ Cano MD   metoprolol (LOPRESSOR) 25 mg tablet Take 25 mg by mouth two (2) times a day. Russ Cano MD       REVIEW OF SYSTEMS:     I am not able to complete the review of systems because:    The patient is intubated and sedated    The patient has altered mental status due to his acute medical problems    The patient has baseline aphasia from prior stroke(s)    The patient has baseline dementia and is not reliable historian    The patient is in acute medical distress and unable to provide information       Constitutional: Negative. Negative for fever. Eyes: Negative. Respiratory: Positive for chest tightness and shortness of breath. Cardiovascular: Negative for chest pain. Gastrointestinal: Negative for abdominal pain, nausea and vomiting. Endocrine: Negative. Genitourinary: Negative. Negative for difficulty urinating, dysuria and hematuria. Musculoskeletal: Negative. Skin: Negative. Neurological: Negative. Psychiatric/Behavioral: Negative for suicidal ideas. All other systems reviewed and are negative. Objective:   VITALS:    Visit Vitals  /60   Pulse 95   Temp 99.5 °F (37.5 °C)   Resp 28   Ht 5' 3\" (1.6 m)   Wt (!) 174.6 kg (385 lb)   SpO2 (!) 87%   BMI 68.20 kg/m²       PHYSICAL EXAM:  Constitutional:       General: She is in acute distress. Appearance: She is well-developed. She is obese. She is not ill-appearing or diaphoretic. HENT:      Head: Normocephalic and atraumatic. Nose: Nose normal.      Mouth/Throat:      Mouth: Mucous membranes are moist.   Eyes:      General: No scleral icterus. Conjunctiva/sclera: Conjunctivae normal.   Neck:      Trachea: No tracheal deviation. Cardiovascular:      Rate and Rhythm: Regular rhythm. Tachycardia present. Heart sounds: Normal heart sounds. No murmur heard. No friction rub. Pulmonary:      Effort: Tachypnea, accessory muscle usage and respiratory distress present. Breath sounds: Normal breath sounds. No stridor. No wheezing or rales. Abdominal:      General: Bowel sounds are normal. There is no distension. Palpations: Abdomen is soft. Tenderness: There is no abdominal tenderness. There is no rebound. Musculoskeletal:         General: No tenderness. Normal range of motion.       Cervical back: Normal range of motion. Skin:     General: Skin is warm and dry. Findings: No rash. Neurological:      Mental Status: She is alert and oriented to person, place, and time. Cranial Nerves: No cranial nerve deficit. Psychiatric:         Speech: Speech normal.         Behavior: Behavior normal.         Thought Content: Thought content normal.         Judgment: Judgment normal.          _______________________________________________________________________  Care Plan discussed with:    Comments   Patient y    Family      RN y    Care Manager                    Consultant:  kevin Bower md   _______________________________________________________________________  Expected  Disposition:   Home with Family y   HH/PT/OT/RN    SNF/LTC    JUAN M    ________________________________________________________________________  TOTAL TIME:   72 Minutes    Critical Care Provided     Minutes non procedure based      Comments    x Reviewed previous records   >50% of visit spent in counseling and coordination of care x Discussion with patient and/or family and questions answered       Given the patient's current clinical presentation, I have a high level of concern for decompensation if discharged from the ED. Complex decision making was performed which includes reviewing the patient's available past medical records, laboratory results, and Xray films. I have also directly communicated my plan and discussed this case with the involved ED physician.     ____________________________________________________________________  Domitila Kael, MD    Procedures: see electronic medical records for all procedures/Xrays and details which were not copied into this note but were reviewed prior to creation of Plan.     LAB DATA REVIEWED:    Recent Results (from the past 24 hour(s))   METABOLIC PANEL, COMPREHENSIVE    Collection Time: 04/21/22  1:10 AM   Result Value Ref Range    Sodium 136 136 - 145 mmol/L    Potassium 4.8 3.5 - 5.1 mmol/L    Chloride 97 97 - 108 mmol/L    CO2 36 (H) 21 - 32 mmol/L    Anion gap 3 (L) 5 - 15 mmol/L    Glucose 187 (H) 65 - 100 mg/dL    BUN 14 6 - 20 MG/DL    Creatinine 0.57 0.55 - 1.02 MG/DL    BUN/Creatinine ratio 25 (H) 12 - 20      GFR est AA >60 >60 ml/min/1.73m2    GFR est non-AA >60 >60 ml/min/1.73m2    Calcium 9.3 8.5 - 10.1 MG/DL    Bilirubin, total 0.4 0.2 - 1.0 MG/DL    ALT (SGPT) 30 12 - 78 U/L    AST (SGOT) 85 (H) 15 - 37 U/L    Alk. phosphatase 145 (H) 45 - 117 U/L    Protein, total 8.2 6.4 - 8.2 g/dL    Albumin 2.4 (L) 3.5 - 5.0 g/dL    Globulin 5.8 (H) 2.0 - 4.0 g/dL    A-G Ratio 0.4 (L) 1.1 - 2.2     CBC WITH AUTOMATED DIFF    Collection Time: 04/21/22  1:10 AM   Result Value Ref Range    WBC 11.1 (H) 3.6 - 11.0 K/uL    RBC 4.36 3.80 - 5.20 M/uL    HGB 13.4 11.5 - 16.0 g/dL    HCT 46.2 35.0 - 47.0 %    .0 (H) 80.0 - 99.0 FL    MCH 30.7 26.0 - 34.0 PG    MCHC 29.0 (L) 30.0 - 36.5 g/dL    RDW 15.5 (H) 11.5 - 14.5 %    PLATELET 944 508 - 000 K/uL    MPV 12.0 8.9 - 12.9 FL    NRBC 0.0 0  WBC    ABSOLUTE NRBC 0.00 0.00 - 0.01 K/uL    NEUTROPHILS 74 32 - 75 %    LYMPHOCYTES 17 12 - 49 %    MONOCYTES 5 5 - 13 %    EOSINOPHILS 2 0 - 7 %    BASOPHILS 0 0 - 1 %    IMMATURE GRANULOCYTES 2 (H) 0.0 - 0.5 %    ABS. NEUTROPHILS 8.3 (H) 1.8 - 8.0 K/UL    ABS. LYMPHOCYTES 1.9 0.8 - 3.5 K/UL    ABS. MONOCYTES 0.5 0.0 - 1.0 K/UL    ABS. EOSINOPHILS 0.2 0.0 - 0.4 K/UL    ABS. BASOPHILS 0.0 0.0 - 0.1 K/UL    ABS. IMM. GRANS. 0.2 (H) 0.00 - 0.04 K/UL    DF AUTOMATED     SAMPLES BEING HELD    Collection Time: 04/21/22  1:10 AM   Result Value Ref Range    SAMPLES BEING HELD SST PST BL     COMMENT        Add-on orders for these samples will be processed based on acceptable specimen integrity and analyte stability, which may vary by analyte.    SAMPLES BEING HELD    Collection Time: 04/21/22  1:10 AM   Result Value Ref Range    SAMPLES BEING HELD PBC     COMMENT        Add-on orders for these samples will be processed based on acceptable specimen integrity and analyte stability, which may vary by analyte.    TROPONIN-HIGH SENSITIVITY    Collection Time: 04/21/22  1:10 AM   Result Value Ref Range    Troponin-High Sensitivity 12 0 - 51 ng/L   NT-PRO BNP    Collection Time: 04/21/22  1:10 AM   Result Value Ref Range    NT pro- (H) <125 PG/ML

## 2022-04-21 NOTE — PROGRESS NOTES
Transition of Care Plan:    RUR: 10%  Disposition: return home with daughter and Hermann Dewitt (declines placement)   Follow up appointments: PCP, specialists as indicated   DME needed: needs new mattress for hospital bed   Transportation at Discharge: [facility] medical transport   [address] or means to access home:  yes      IM Medicare Letter: to be reviewed prior to d/c   Is patient a BCPI-A Bundle: No         If yes, was Bundle Letter given?:    Is patient a [place] and connected with the [place]? No           If yes, was [employer] transfer form completed and [facility] notified? Caregiver Contact: Jude Fink (daughter) [phone]  Discharge Caregiver contacted prior to discharge? yes   Care Conference needed?: no    Reason for Admission:  Hypoxia, CHF exacerbation                      RUR Score:  10%                   Plan for utilizing home health:  Likely for wound care         PCP: First and Last name: Hetal Hollins   Name of Practice:    Are you a current patient: Yes/No: no   Approximate date of last visit: August 2021 (house calls)    Can you participate in a virtual visit with your PCP: yes                    Current Advanced Directive/Advance Care Plan: Full Code    Advance Care Planning   Healthcare Decision Maker:       Primary Decision Maker (Active): CadetKianna - Child - [phone]    Secondary Decision Maker: Jayesh Franki - Sister - [phone]      Today we documented Decision Maker(s) consistent with ACP documents on file. Transition of Care Plan:  Home with daughter who is caregiver, [facility]. Pt is not interested in placement at this time. Chart reviewed. Consult received. Met with pt at bedside to introduce self and role. Verified contact information and demographics. Pt resides in a one level home with 5 ADDISON with her daughter, Lizzy Collazo, and granddaughter, Huber Modi. Pt's sister, [place], lived there but recently passed away last month. Emotional support provided.  Pt reports her daughter, Lizzy Collazo, has been her primary caregiver for the past year. She quit her job to take care of her and her sister, [place], at the time. Pt is bed bound at baseline. She has not been able to ambulate for over a year due to knee pain. She reports having no cartilage in her knees making it very painful to move around. 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 Pt uses home O2 (4L) at baseline Brianne Santos) and has a home [employer]). She has a nebulizer, hospital bed, and wheelchair. Pt with hx of [facility] in 2020 and SNF stay at [facility] and [address] [place] [address] in 2020. ACP on file. Pt is FULL code. Inquired about PCP- pt last saw Shy Tammy in August 2021 for house calls. She is unable to leave her house. Preferred pharmacy is [facility]. Pt found to have multiple wounds. Pt reports that her hospital bed has a \"dip\" in it which makes it hard for her to turn. She needs a new mattress. Hospital bed from [facility] (2 years ago). Daughter has called and now that pt owns the bed, they cannot replace the mattress. Pt will need to purchase a new one which she cannot afford at this time. Per daughter, she has been unable to turn patient at all and is not aware of any wounds. Daughter performs ADLs from the bed and brings her food. Briefly discussed caregiver support- (Medicaid caregivers vs LTC placement). Pt states she is not interested in going to a nursing home. She wants to return home at d/c with daughter. Spoke with daughter to inquire about Medicaid caregiver services. Daughter reports she has not looked into this option, but will plan to do so. Daughter states that pt would rather her care for her at home rather than receive care from a stranger. Will continue to follow. Care Management Interventions  PCP Verified by CM:  Yes (Shy Munoz )  Palliative Care Criteria Met (RRAT>21 & CHF Dx)?: No  Mode of Transport at Discharge: \[facility] [place] [facility]\""  Transition of Care Consult (CM Consult): Discharge Planning  Discharge Durable Medical Equipment: No  Physical Therapy Consult: Yes  Occupational Therapy Consult: Yes  Speech Therapy Consult: No  Support Systems: Child(dejuan),Other Family Member(s)  Confirm Follow Up Transport: Family  The Patient and/or Patient Representative was Provided with a Choice of Provider and Agrees with the Discharge Plan?: Yes  Name of the Patient Representative Who was Provided with a Choice of Provider and Agrees with the Discharge Plan: linda Velazquez  Freedom of Choice List was Provided with Basic Dialogue that Supports the Patient's Individualized Plan of Care/Goals, Treatment Preferences and Shares the Quality Data Associated with the Providers?: No   Resource Information Provided?: No  Discharge Location  Patient Expects to be Discharged to[de-identified] Home with home health    Andree Shepard, [address] [place] [address] [place], [address] [facility]  [phone]
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
## 2022-04-21 NOTE — ED PROVIDER NOTES
EMERGENCY DEPARTMENT HISTORY AND PHYSICAL EXAM     ----------------------------------------------------------------------------  Please note that this dictation was completed with [employer], the computer voice recognition software. Quite often unanticipated grammatical, syntax, homophones, and other interpretive errors are inadvertently transcribed by the computer software. Please disregard these errors. Please excuse any errors that have escaped final proofreading  ----------------------------------------------------------------------------      Date: 4/21/2022  Patient Name: Letha Pineda    History of Presenting Illness     Chief Complaint   Patient presents with    Respiratory Distress       History Provided By:  Patient    HPI: Letha Pineda is a 57 y.o. female, with significant pmhx of diabetes, hypertension, cholesterol, CHF, atrial fibrillation, COPD, who presents via  EMS to the ED with c/o shortness of breath. EMS reported patient was satting at 88% on room air at home. Reports having rapid progression of shortness of breath with chest tightness since yesterday without improvement of sx with home nebulizer. EMS also reports that the patient has not been on the bed in the last 2 years and does all of her activities of daily living from her bed which is a hospital type. Patient reports that she lives at home with family and does not have any home health care. Patient extremely unkempt and noted to have wounds to her backside causing active bleeding when EMS moved her off of their reef. Patient also specifically denies any associated fevers, chills, CP, SOB, nausea, vomiting, diarrhea, abd pain, changes in BM, urinary sxs, or headache. Social Hx: denies tobacco  denies EtOH , denies recreational/Illicit Drugs    There are no other complaints, changes, or physical findings at this time.      PCP: Unknown, Provider, MD    Allergies   Allergen Reactions    Cefazolin Rash    Other Plant, Animal, Environmental Rash     Ivory soap caused rash on hands.        Current Facility-Administered Medications   Medication Dose Route Frequency Provider Last Rate Last Admin    sodium chloride (NS) flush 5-40 mL  5-40 mL IntraVENous Q8H Kellee Woodard MD   10 mL at 04/21/22 0110    sodium chloride (NS) flush 5-40 mL  5-40 mL IntraVENous PRN Kellee Woodard MD        albuterol-ipratropium (DUO-NEB) 2.5 MG-0.5 MG/3 ML  3 mL Nebulization QID RT Din, Rafaela Campos MD        aspirin chewable tablet 81 mg  81 mg Oral DAILY Din, Rafaela Campos MD        atorvastatin (LIPITOR) tablet 10 mg  10 mg Oral QHS Din, Rafaela Campos MD        arformoterol 15 mcg/budesonide 0.25 mg neb solution   Nebulization BID RT Din, Rafaela Campos MD        metoprolol tartrate (LOPRESSOR) tablet 25 mg  25 mg Oral BID Din, Rafaela Campos MD        methylPREDNISolone (PF) (SOLU-MEDROL) injection 40 mg  40 mg IntraVENous Q8H Din, Rafaela Campos MD        furosemide (LASIX) injection 40 mg  40 mg IntraVENous BID Din, Rafaela Campos MD        albuterol (PROVENTIL) 5 mg/mL nebulizer solution 2.5 mg  2.5 mg Inhalation Q4H PRN Din, Rafaela Campos MD        doxycycline (VIBRAMYCIN) 100 mg in 0.9% sodium chloride (MBP/ADV) 100 mL MBP  100 mg IntraVENous Q12H Din, Rafaela Campos MD        sodium chloride (NS) flush 5-40 mL  5-40 mL IntraVENous Q8H Din, Rafaela Campos MD        sodium chloride (NS) flush 5-40 mL  5-40 mL IntraVENous PRN Din, Rafaela Campos MD        acetaminophen (TYLENOL) tablet 650 mg  650 mg Oral Q6H PRN Din, Rafaela Campos MD        Or    acetaminophen (TYLENOL) suppository 650 mg  650 mg Rectal Q6H PRN Din, Rafaela Campos MD        polyethylene glycol (MIRALAX) packet 17 g  17 g Oral DAILY PRN Din, Rafaela Campos MD        ondansetron (ZOFRAN ODT) tablet 4 mg  4 mg Oral Q8H PRN Din, Rafaela Campos MD        Or    ondansetron (ZOFRAN) injection 4 mg  4 mg IntraVENous Q6H PRN Din, Rafaela Campos MD        enoxaparin (LOVENOX) injection 40 mg  40 mg SubCUTAneous DAILY Din, Rafaela Campos MD        insulin lispro (HUMALOG) injection   SubCUTAneous AC&HS Jhonny Powell MD        glucose chewable tablet 16 g  4 Tablet Oral PRN Jhonny Poewll MD        glucagon (GLUCAGEN) injection 1 mg  1 mg IntraMUSCular PRN Jhonny Powell MD        dextrose 10% infusion 0-250 mL  0-250 mL IntraVENous PRN Jhonny Powell MD         Current Outpatient Medications   Medication Sig Dispense Refill    albuterol-ipratropium (DUO-NEB) 2.5 mg-0.5 mg/3 ml nebu 3 mL by Nebulization route every four (4) hours as needed for Wheezing, Shortness of Breath or Respiratory Distress. 100 Nebule 0    fluticasone propion-salmeteroL (Advair Diskus) 250-50 mcg/dose diskus inhaler Take 1 Puff by inhalation every twelve (12) hours. 1 Each 0    tiotropium (Spiriva with HandiHaler) 18 mcg inhalation capsule Take 1 Cap by inhalation daily. 30 Cap 0    Nebulizer & Compressor machine To use nebulizer as directed 1 Each 0    menthol-zinc oxide (Moisture Barrier Ointment) 0.44-20.6 % oint Apply 1 Each to affected area three (3) times daily. thin layer moisture associated lesion      aspirin 81 mg chewable tablet Take 1 Tab by mouth daily. (Patient not taking: Reported on 4/21/2022) 30 Tab 0    OXYGEN-AIR DELIVERY SYSTEMS 3 L/min by Nasal route continuous.  acetaminophen (TYLENOL) 325 mg tablet Take 650 mg by mouth every six (6) hours as needed for Pain.  furosemide (LASIX) 40 mg tablet Take 1 Tab by mouth daily. (Patient taking differently: Take 40 mg by mouth daily. Take 1/2 tablet by mouth daily) 30 Tab 0    atorvastatin (LIPITOR) 10 mg tablet Take 10 mg by mouth nightly.  metoprolol (LOPRESSOR) 25 mg tablet Take 25 mg by mouth two (2) times a day.          Past History     Past Medical History:  Past Medical History:   Diagnosis Date    Acute on chronic respiratory failure with hypoxia and hypercapnia ([address] 11/20/2020    Atrial fibrillation ([facility] 11/4/2020    CHF (congestive heart failure) (HCC)     Diabetes ([facility] [facility]     Hypertension     Ill-defined condition     high cholesterol    Ill-defined condition     tachycardia    JOSLYN (obstructive sleep apnea) 11/20/2020    Other and unspecified symptoms and signs involving general sensations and perceptions     ruptured disc    S/P cardiac cath 11/4/2020    11/3/2020 nonobstructive disease       Past Surgical History:  Past Surgical History:   Procedure Laterality Date    MI COMPRE ELECTROPHYSIOL XM W/LEFT ATRIAL PACNG/REC N/A 11/23/2020    Lt Atrial Pace & Record During Ep Study performed by Tootie Zendejas MD at [facility]    MI COMPRE EP EVAL ABLTJ 3D MAPG TX SVT N/A 11/23/2020    ABLATION A-FLUTTER performed by Tootie Zendejas MD at [facility] CARDIAC CATH LAB   [address] ADD ON N/A 11/23/2020    Ablation Svt/Vt Add On performed by Tootie Zendejas MD at [facility] CARDIAC CATH LAB    MI INTRACARDIAC ELECTROPHYSIOLOGIC 3D MAPPING N/A 11/23/2020    Ep 3d Mapping performed by Tootie Zendejas MD at [facility] CARDIAC CATH LAB    MI STIM/PACING HEART POST IV DRUG INFU N/A 11/23/2020    Drug Stimulation performed by Tootie Zendejas MD at [facility] CARDIAC CATH LAB       Family History:  History reviewed. No pertinent family history. Social History:  Social History     Tobacco Use    Smoking status: Former Smoker    Smokeless tobacco: Never Used   Substance Use Topics    Alcohol use: No    Drug use: No       Allergies: Allergies   Allergen Reactions    Cefazolin Rash    Other Plant, Animal, Environmental Rash     Ivory soap caused rash on hands. Review of Systems   Review of Systems   Constitutional: Negative. Negative for fever. Eyes: Negative. Respiratory: Positive for chest tightness and shortness of breath. Cardiovascular: Negative for chest pain. Gastrointestinal: Negative for abdominal pain, nausea and vomiting. Endocrine: Negative. Genitourinary: Negative. Negative for difficulty urinating, dysuria and hematuria. Musculoskeletal: Negative. Skin: Negative. Neurological: Negative. Psychiatric/Behavioral: Negative for suicidal ideas. All other systems reviewed and are negative. Physical Exam   Physical Exam  Vitals and nursing note reviewed. Constitutional:       General: She is in acute distress. Appearance: She is well-developed. She is obese. She is not ill-appearing or diaphoretic. HENT:      Head: Normocephalic and atraumatic. Nose: Nose normal.      Mouth/Throat:      Mouth: Mucous membranes are moist.   Eyes:      General: No scleral icterus. Conjunctiva/sclera: Conjunctivae normal.   Neck:      Trachea: No tracheal deviation. Cardiovascular:      Rate and Rhythm: Regular rhythm. Tachycardia present. Heart sounds: Normal heart sounds. No murmur heard. No friction rub. Pulmonary:      Effort: Tachypnea, accessory muscle usage and respiratory distress present. Breath sounds: Normal breath sounds. No stridor. No wheezing or rales. Abdominal:      General: Bowel sounds are normal. There is no distension. Palpations: Abdomen is soft. Tenderness: There is no abdominal tenderness. There is no rebound. Musculoskeletal:         General: No tenderness. Normal range of motion. Cervical back: Normal range of motion. Skin:     General: Skin is warm and dry. Findings: Erythema (c/w yeast/fungal infection) and rash present. Rash is scaling (bilateral LEs). Neurological:      Mental Status: She is alert and oriented to person, place, and time. Cranial Nerves: No cranial nerve deficit. Psychiatric:         Speech: Speech normal.         Behavior: Behavior normal.         Thought Content:  Thought content normal.         Judgment: Judgment normal.           Diagnostic Study Results     Labs -     Recent Results (from the past 12 hour(s))   METABOLIC PANEL, COMPREHENSIVE    Collection Time: 04/21/22  1:10 AM   Result Value Ref Range    Sodium 136 136 - 145 mmol/L    Potassium 4.8 3.5 - 5.1 mmol/L    Chloride 97 97 - 108 mmol/L    CO2 36 (H) 21 - 32 mmol/L    Anion gap 3 (L) 5 - 15 mmol/L    Glucose 187 (H) 65 - 100 mg/dL    BUN 14 6 - 20 MG/DL    Creatinine 0.57 0.55 - 1.02 MG/DL    BUN/Creatinine ratio 25 (H) 12 - 20      GFR est AA >60 >60 ml/min/1.73m2    GFR est non-AA >60 >60 ml/min/1.73m2    Calcium 9.3 8.5 - 10.1 MG/DL    Bilirubin, total 0.4 0.2 - 1.0 MG/DL    ALT (SGPT) 30 12 - 78 U/L    AST (SGOT) 85 (H) 15 - 37 U/L    Alk. phosphatase 145 (H) 45 - 117 U/L    Protein, total 8.2 6.4 - 8.2 g/dL    Albumin 2.4 (L) 3.5 - 5.0 g/dL    Globulin 5.8 (H) 2.0 - 4.0 g/dL    A-G Ratio 0.4 (L) 1.1 - 2.2     CBC WITH AUTOMATED DIFF    Collection Time: 04/21/22  1:10 AM   Result Value Ref Range    WBC 11.1 (H) 3.6 - 11.0 K/uL    RBC 4.36 3.80 - 5.20 M/uL    HGB 13.4 11.5 - 16.0 g/dL    HCT 46.2 35.0 - 47.0 %    .0 (H) 80.0 - 99.0 FL    MCH 30.7 26.0 - 34.0 PG    MCHC 29.0 (L) 30.0 - 36.5 g/dL    RDW 15.5 (H) 11.5 - 14.5 %    PLATELET 517 004 - 304 K/uL    MPV 12.0 8.9 - 12.9 FL    NRBC 0.0 0  WBC    ABSOLUTE NRBC 0.00 0.00 - 0.01 K/uL    NEUTROPHILS 74 32 - 75 %    LYMPHOCYTES 17 12 - 49 %    MONOCYTES 5 5 - 13 %    EOSINOPHILS 2 0 - 7 %    BASOPHILS 0 0 - 1 %    IMMATURE GRANULOCYTES 2 (H) 0.0 - 0.5 %    ABS. NEUTROPHILS 8.3 (H) 1.8 - 8.0 K/UL    ABS. LYMPHOCYTES 1.9 0.8 - 3.5 K/UL    ABS. MONOCYTES 0.5 0.0 - 1.0 K/UL    ABS. EOSINOPHILS 0.2 0.0 - 0.4 K/UL    ABS. BASOPHILS 0.0 0.0 - 0.1 K/UL    ABS. IMM. GRANS. 0.2 (H) 0.00 - 0.04 K/UL    DF AUTOMATED     SAMPLES BEING HELD    Collection Time: 04/21/22  1:10 AM   Result Value Ref Range    SAMPLES BEING HELD SST PST BL     COMMENT        Add-on orders for these samples will be processed based on acceptable specimen integrity and analyte stability, which may vary by analyte.    SAMPLES BEING HELD    Collection Time: 04/21/22  1:10 AM   Result Value Ref Range    SAMPLES BEING HELD PBC     COMMENT Add-on orders for these samples will be processed based on acceptable specimen integrity and analyte stability, which may vary by analyte. TROPONIN-HIGH SENSITIVITY    Collection Time: 04/21/22  1:10 AM   Result Value Ref Range    Troponin-High Sensitivity 12 0 - 51 ng/L   NT-PRO BNP    Collection Time: 04/21/22  1:10 AM   Result Value Ref Range    NT pro- (H) <125 PG/ML       Radiologic Studies -   XR CHEST PORT   Final Result   Moderately severe pulmonary edema, right upper lobe atelectasis, and   small bilateral pleural effusions. CT Results  (Last 48 hours)    None        CXR Results  (Last 48 hours)               04/21/22 0242  XR CHEST PORT Final result    Impression:  Moderately severe pulmonary edema, right upper lobe atelectasis, and   small bilateral pleural effusions. Narrative:  INDICATION: Shortness of breath       COMPARISON: 12/2/2020       FINDINGS: AP portable imaging of the chest performed at 2:31 AM demonstrates a   stable cardiomediastinal silhouette. There is moderately severe pulmonary   interstitial edema with right upper lobe atelectasis. Small bilateral pleural   effusions are present. No significant osseous abnormalities are seen. Medical Decision Making   I am the first provider for this patient. I reviewed the vital signs, available nursing notes, past medical history, past surgical history, family history and social history. Vital Signs-Reviewed the patient's vital signs.   Patient Vitals for the past 12 hrs:   Temp Pulse Resp BP SpO2   04/21/22 0419 -- -- -- -- (!) 87 %   04/21/22 0415 -- 95 -- 116/60 98 %   04/21/22 0215 -- 98 28 (!) 146/42 98 %   04/21/22 0200 -- (!) 102 30 (!) 138/57 99 %   04/21/22 0145 -- (!) 103 26 (!) 117/54 100 %   04/21/22 0130 -- (!) 102 21 (!) 128/56 97 %   04/21/22 0115 -- (!) 105 24 (!) 143/61 100 %   04/21/22 0100 -- (!) 102 (!) 41 129/88 97 %   04/21/22 0045 -- (!) 110 (!) 34 (!) 168/143 (!) 87 %   04/21/22 0043 -- (!) 116 (!) 48 (!) 186/88 (!) 69 %   04/21/22 0035 99.5 °F (37.5 °C) (!) 110 (!) 44 (!) 186/88 (!) 88 %       Pulse Oximetry Analysis - 90% on 10L NRB, abnormal  Rate: 110 bpm  Rhythm: sinus tach      Provider Notes (Medical Decision Making):     DDX:  Pneumonia, pulmonary edema, CHF exacerbation, COPD exacerbation    Plan:  Labs, EKG, chest x-ray, transition down supplemental oxygen as tolerated    Impression:  Pulmonary edema, CHF exacerbation    ED Course:   Initial assessment performed. The patients presenting problems have been discussed, and they are in agreement with the care plan formulated and outlined with them. I have encouraged them to ask questions as they arise throughout their visit. I reviewed the nursing notes and and vital signs from today's visit, as well as the electronic medical record system for any past medical records that were available that may contribute to the patients current condition, including previous primary care visits for management of chronic medical concerns    Nursing notes will be reviewed as they become available in realtime while the pt has been in the ED. Brenden Lazaro MD    EKG interpretation 1042: Sinus tachycardia, nl Axis, rate 103; , QRS 94, QTc 448; NO STEMI; interpreted by Brenden Lazaro MD    I personally reviewed/interpreted pt's imaging. Agree with official read by radiology as noted above. Brenden Lazaro MD      CONSULT NOTE:   4:57 Dedrick Levy MD spoke with Dr. Kelli Mendoza,   Specialty: Bertha Mendoza due to hypoxic respiratory failure. Discussed pt's HPI and available diagnostic results thus far. Expressed concerns for needed admission. Consultant will evaluate for admission. Brenden Lazaro MD    ADMISSION NOTE:  4:57 AM  Patient is being admitted to the hospital by Dr. Kelli Mendoza. The results of their tests and reasons for their admission have been discussed with them and/or available family.   They convey agreement and understanding for the need to be admitted and for their admission diagnosis. Christ Arias MD           Critical Care Time:     none      Diagnosis     Clinical Impression:   1. Acute pulmonary edema (HCC)    2. SOB (shortness of breath)    3. Acute respiratory failure with hypoxia (HCC)        PLAN:  1.  Admit to hospitalist

## 2022-04-21 NOTE — PROGRESS NOTES
1450: TRANSFER - IN REPORT:    Verbal report received from DelorisRN(name) on [place]  being received from Neuro(unit) for change in patient condition([facility] pt)      Report consisted of patients Situation, Background, Assessment and   Recommendations(SBAR). Information from the following report(s) SBAR, Kardex and Cardiac Rhythm NSR was reviewed with the receiving nurse. Opportunity for questions and clarification was provided. Assessment completed upon patients arrival to unit and care assumed. 1520: Kelli Vail at bedside to help with dual skin d/t severe skin issues and poor hygiene. Pt has scattered DTIs, pinching marks, traumatic injury to buttock, and fungal rashes over various parts of the body (see LDA). MD Luisa and Jose Raul Keenan called to bedside d/t moderate bleeding from buttock wound ,unable to suture d/t friable skin (two sutures placed), Surgicell applied to try to stop bleeding. 1545: Pt given a CHG bath. 1620: Received phone call from Adult Protective Services requesting specific information regarding pt condition. HIPPA information verified over the phone and information provided to APS. 1700: Forensic nursing consult placed. Spoke with Forensic nurse over phone they are on their way. 1745: Forensic nurse at bedside.

## 2022-04-21 NOTE — CONSULTS
[facility]     Date of  Admission: 4/21/2022 12:31 AM     Admission type:Emergency    Referral for: CHF   Referral by: Attending      Subjective: Charan Lino is a 57 y.o. female with PMHx of  PAF, chronic hypercapneia, hypoxia, untreated JOSLYN, HTNadmitted for Hypoxia [R09.02]  CHF exacerbation (HCC) [I50.9]  COPD exacerbation ([facility] [address] [J44.1]  Physical deconditioning [R53.81]. Per admitting provider, the patient presented with complaints of respiratory distress, placed on non-rebreather for Sat's 84%. She has chronic hyoxia and hypercapnia with COPD on 4 L NC     Upon assessment, she endorses the above. She is a bed-bound chronically ill patient who is managed at home by her daughter. She reports she is compliant with her medications, takes \"BP, cholesterol, and fluid medication\". She reports she is unable to attend any outpatient follow up because \"I can't get to the appointments\". She reports a rapid progression of her shortness of breath and a pressure across her chest. She reports feeling \"sometimes better after fluid medicine\". She states that she could not catch her breath, even talking. Chronic leg swelling reported. Denies any smoking, ETOH, or illicit drug use.      She is an established patient of Dr. Yamilex Peraza and Dr. Yahir Brown, only seen inpatient because patient reports \"I cannot get to my appointments\". Previous treatment/evaluation includes echocardiogram 7/2020 showed EF 50-55% and Recent cardiac cath on 11/3/2020 with nonobstructive disease.      Cardiac risk factors: smoking/ tobacco exposure, family history, dyslipidemia, obesity, sedentary life style, hypertension, stress, post-menopausal.    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  Sciatica 04/21/2022    Hypoxia 04/21/2022    COPD exacerbation ([address] 04/21/2022    CHF exacerbation (HCC) 04/21/2022    SOB (shortness of breath) 12/04/2020    Physical deconditioning 12/04/2020    Counseling regarding advance care planning and goals of care 12/04/2020    Atrial flutter with rapid ventricular response ([address] 11/20/2020    JOSLYN (obstructive sleep apnea) 11/20/2020    Acute on chronic respiratory failure with hypoxia and hypercapnia ([address] 11/20/2020    Acute respiratory failure ([address] 11/18/2020    Mixed hyperlipidemia 11/15/2020    Morbid obesity ([address] 11/15/2020    S/P cardiac cath 11/04/2020    COVID-19 ruled out 07/24/2020      Unknown, Provider, MD  Past Medical History:   Diagnosis Date    Acute on chronic respiratory failure with hypoxia and hypercapnia ([address] 11/20/2020    Atrial fibrillation ([address] 11/4/2020    CHF (congestive heart failure) ([address]     Diabetes ([address]     Hypertension     Ill-defined condition     high cholesterol    Ill-defined condition     tachycardia    JOSLYN (obstructive sleep apnea) 11/20/2020    Other and unspecified symptoms and signs involving general sensations and perceptions     ruptured disc    S/P cardiac cath 11/4/2020    11/3/2020 nonobstructive disease      Social History     Socioeconomic History    Marital status: SINGLE   Tobacco Use    Smoking status: Former Smoker    Smokeless tobacco: Never Used   Substance and Sexual Activity    Alcohol use: No    Drug use: No     Allergies   Allergen Reactions    Cefazolin Rash    Other Plant, Animal, Environmental Rash     Ivory soap caused rash on hands. History reviewed. No pertinent family history.    Current Facility-Administered Medications   Medication Dose Route Frequency    sodium chloride (NS) flush 5-40 mL  5-40 mL IntraVENous Q8H    sodium chloride (NS) flush 5-40 mL  5-40 mL IntraVENous PRN    albuterol-ipratropium (DUO-NEB) 2.5 MG-0.5 MG/3 ML  3 mL Nebulization QID RT    aspirin chewable tablet 81 mg  81 mg Oral DAILY    atorvastatin (LIPITOR) tablet 10 mg  10 mg Oral QHS    arformoterol 15 mcg/budesonide 0.25 mg neb solution   Nebulization BID RT    metoprolol tartrate (LOPRESSOR) tablet 25 mg  25 mg Oral BID    methylPREDNISolone (PF) (SOLU-MEDROL) injection 40 mg  40 mg IntraVENous Q8H    furosemide (LASIX) injection 40 mg  40 mg IntraVENous BID    albuterol (PROVENTIL VENTOLIN) nebulizer solution 2.5 mg  2.5 mg Inhalation Q4H PRN    doxycycline (VIBRAMYCIN) 100 mg in 0.9% sodium chloride (MBP/ADV) 100 mL MBP  100 mg IntraVENous Q12H    sodium chloride (NS) flush 5-40 mL  5-40 mL IntraVENous Q8H    sodium chloride (NS) flush 5-40 mL  5-40 mL IntraVENous PRN    acetaminophen (TYLENOL) tablet 650 mg  650 mg Oral Q6H PRN    Or    acetaminophen (TYLENOL) suppository 650 mg  650 mg Rectal Q6H PRN    polyethylene glycol (MIRALAX) packet 17 g  17 g Oral DAILY PRN    ondansetron (ZOFRAN ODT) tablet 4 mg  4 mg Oral Q8H PRN    Or    ondansetron (ZOFRAN) injection 4 mg  4 mg IntraVENous Q6H PRN    enoxaparin (LOVENOX) injection 40 mg  40 mg SubCUTAneous DAILY    insulin lispro (HUMALOG) injection   SubCUTAneous AC&HS    glucose chewable tablet 16 g  4 Tablet Oral PRN    glucagon (GLUCAGEN) injection 1 mg  1 mg IntraMUSCular PRN    dextrose 10% infusion 0-250 mL  0-250 mL IntraVENous PRN    hydrALAZINE (APRESOLINE) 20 mg/mL injection 20 mg  20 mg IntraVENous Q6H PRN    oxyCODONE-acetaminophen (PERCOCET) 5-325 mg per tablet 1 Tablet  1 Tablet Oral Q6H PRN          Review of Symptoms:  Constitutional: negative  Eyes: negative  Ears, nose, mouth, throat, and face: negative  Respiratory: chest tightness and SOB, orthopnea, PND  Cardiovascular: negative  Gastrointestinal: negative  Genitourinary:negative  Musculoskeletal:negative  Neurological: negative  Behvioral/Psych: negative  Endocrine: negative            Objective:      Visit Vitals  /68   Pulse 98   Temp 98.5 °F (36.9 °C)   Resp 22   Ht 5' 3\" (1.6 m)   Wt (!) 174.6 kg (385 lb)   SpO2 95%   BMI 68.20 kg/m²       Physical Assessment:   General Appearance:  alert, cooperative, morbidly obese  female in NAD, appears odler than stated age   Eyes: sclera anicteric  Mouth/Throat: moist mucous membranes; oral pharynx clear  Neck: supple; no JVD or bruit  Pulmonary:  Diminished to auscultation bilaterally; good effort  Cardiovascular: rapid rate and regular rhythm; no murmur, click, rub, or gallop  Abdomen: soft, non-tender, non-distended; bowel sounds normal  Musculoskeletal: no swelling or deformity; moves all extremities  Extremities: 3+ edema; palpable distal pulses   Skin: thick, leathery skin to lower extremities, sores   Neuro: grossly normal  Psych: normal mood and affect given the setting      Data Review:   Recent Labs     04/21/22  0110   WBC 11.1*   HGB 13.4   HCT 46.2        Recent Labs     04/21/22  0110      K 4.8   CL 97   CO2 36*   *   BUN 14   CREA 0.57   CA 9.3   ALB 2.4*   TBILI 0.4   ALT 30       Recent Labs     04/21/22  0531 04/21/22  0110   TROPHS 24 12       No intake or output data in the 24 hours ending 04/21/22 1252     Cardiographics    Telemetry: SR/ST   Echocardiogram: prior as above, new pending   CXRAY: \"Moderately severe pulmonary edema, right upper lobe atelectasis, and  small bilateral pleural effusions. \"       Assessment:       Active Problems:    Physical deconditioning (12/4/2020)      Hypoxia (4/21/2022)      COPD exacerbation ([facility] [address] (4/21/2022)      CHF exacerbation ([facility] [address] (4/21/2022)         Plan:   Acute CHF exacerbation:   Pulmonary Edema seen on CXray:  Morbid Obesity:  COPD:  JOSLYN:  Continue BB, ASA, statin  IV Lasix 40 mg BID  Strict I/O, labs, daily weights   Cardiac cath 2020-no obstructive CAD  Nebs/steroids/tx and bipap as per internal medicine  Repeat ECHO  EKG pending  Back to baseline O2 needs, 4L/NC    Hx Afib/flutter:  Continue BB, currently NSR/ST  Hx prior cardioversions  On ASA inpatient-did not take PTA for reported nose bleeds     HLD:  Continue statin    HTN:   Controlled on BB     Thank you for allowing us to partner in the care of our established patient, will follow. Mary Franco NP   DNP,APRN,AG-ACNP-BC       Patient seen and examined by me with nurse practitioner. I personally performed all components of the history, physical, and medical decision making and agree with the assessment and plan as noted. Continue diuresis. F/u Echo.      Cathy Covington MD

## 2022-04-22 ENCOUNTER — APPOINTMENT (OUTPATIENT)
Dept: NON INVASIVE DIAGNOSTICS | Age: 62
DRG: 291 | End: 2022-04-22
Attending: NURSE PRACTITIONER
Payer: MEDICARE

## 2022-04-22 LAB
ALBUMIN SERPL-MCNC: 2.3 G/DL (ref 3.5–5)
ALBUMIN/GLOB SERPL: 0.4 {RATIO} (ref 1.1–2.2)
ALP SERPL-CCNC: 120 U/L (ref 45–117)
ALT SERPL-CCNC: 27 U/L (ref 12–78)
ANION GAP SERPL CALC-SCNC: 2 MMOL/L (ref 5–15)
AST SERPL-CCNC: 48 U/L (ref 15–37)
ATRIAL RATE: 103 BPM
BACTERIA SPEC CULT: NORMAL
BILIRUB SERPL-MCNC: 0.9 MG/DL (ref 0.2–1)
BUN SERPL-MCNC: 20 MG/DL (ref 6–20)
BUN/CREAT SERPL: 32 (ref 12–20)
CALCIUM SERPL-MCNC: 9.3 MG/DL (ref 8.5–10.1)
CALCULATED P AXIS, ECG09: 68 DEGREES
CALCULATED R AXIS, ECG10: 79 DEGREES
CALCULATED T AXIS, ECG11: 69 DEGREES
CC UR VC: NORMAL
CHLORIDE SERPL-SCNC: 94 MMOL/L (ref 97–108)
CO2 SERPL-SCNC: 36 MMOL/L (ref 21–32)
CREAT SERPL-MCNC: 0.63 MG/DL (ref 0.55–1.02)
DIAGNOSIS, 93000: NORMAL
ERYTHROCYTE [DISTWIDTH] IN BLOOD BY AUTOMATED COUNT: 15.5 % (ref 11.5–14.5)
GLOBULIN SER CALC-MCNC: 5.6 G/DL (ref 2–4)
GLUCOSE BLD STRIP.AUTO-MCNC: 170 MG/DL (ref 65–117)
GLUCOSE BLD STRIP.AUTO-MCNC: 176 MG/DL (ref 65–117)
GLUCOSE BLD STRIP.AUTO-MCNC: 221 MG/DL (ref 65–117)
GLUCOSE BLD STRIP.AUTO-MCNC: 258 MG/DL (ref 65–117)
GLUCOSE SERPL-MCNC: 184 MG/DL (ref 65–100)
HCT VFR BLD AUTO: 42.4 % (ref 35–47)
HGB BLD-MCNC: 12.7 G/DL (ref 11.5–16)
MAGNESIUM SERPL-MCNC: 2.2 MG/DL (ref 1.6–2.4)
MCH RBC QN AUTO: 30.9 PG (ref 26–34)
MCHC RBC AUTO-ENTMCNC: 30 G/DL (ref 30–36.5)
MCV RBC AUTO: 103.2 FL (ref 80–99)
NRBC # BLD: 0 K/UL (ref 0–0.01)
NRBC BLD-RTO: 0 PER 100 WBC
P-R INTERVAL, ECG05: 158 MS
PLATELET # BLD AUTO: 196 K/UL (ref 150–400)
PMV BLD AUTO: 12.2 FL (ref 8.9–12.9)
POTASSIUM SERPL-SCNC: 4.4 MMOL/L (ref 3.5–5.1)
PROT SERPL-MCNC: 7.9 G/DL (ref 6.4–8.2)
Q-T INTERVAL, ECG07: 342 MS
QRS DURATION, ECG06: 64 MS
QTC CALCULATION (BEZET), ECG08: 448 MS
RBC # BLD AUTO: 4.11 M/UL (ref 3.8–5.2)
SERVICE CMNT-IMP: ABNORMAL
SERVICE CMNT-IMP: NORMAL
SODIUM SERPL-SCNC: 132 MMOL/L (ref 136–145)
VENTRICULAR RATE, ECG03: 103 BPM
WBC # BLD AUTO: 16.4 K/UL (ref 3.6–11)

## 2022-04-22 PROCEDURE — 74011250637 HC RX REV CODE- 250/637: Performed by: HOSPITALIST

## 2022-04-22 PROCEDURE — 80053 COMPREHEN METABOLIC PANEL: CPT

## 2022-04-22 PROCEDURE — 74011000250 HC RX REV CODE- 250: Performed by: EMERGENCY MEDICINE

## 2022-04-22 PROCEDURE — 74011000250 HC RX REV CODE- 250: Performed by: HOSPITALIST

## 2022-04-22 PROCEDURE — 2709999900 HC NON-CHARGEABLE SUPPLY

## 2022-04-22 PROCEDURE — 74011250636 HC RX REV CODE- 250/636: Performed by: HOSPITALIST

## 2022-04-22 PROCEDURE — 77030038269 HC DRN EXT URIN PURWCK BARD -A

## 2022-04-22 PROCEDURE — 83735 ASSAY OF MAGNESIUM: CPT

## 2022-04-22 PROCEDURE — 74011250637 HC RX REV CODE- 250/637: Performed by: INTERNAL MEDICINE

## 2022-04-22 PROCEDURE — 85027 COMPLETE CBC AUTOMATED: CPT

## 2022-04-22 PROCEDURE — 82962 GLUCOSE BLOOD TEST: CPT

## 2022-04-22 PROCEDURE — 94660 CPAP INITIATION&MGMT: CPT

## 2022-04-22 PROCEDURE — 74011000258 HC RX REV CODE- 258: Performed by: HOSPITALIST

## 2022-04-22 PROCEDURE — 77030040392 HC DRSG OPTIFOAM MDII -A

## 2022-04-22 PROCEDURE — 74011636637 HC RX REV CODE- 636/637: Performed by: HOSPITALIST

## 2022-04-22 PROCEDURE — C8929 TTE W OR WO FOL WCON,DOPPLER: HCPCS

## 2022-04-22 PROCEDURE — 65270000029 HC RM PRIVATE

## 2022-04-22 PROCEDURE — 94640 AIRWAY INHALATION TREATMENT: CPT

## 2022-04-22 PROCEDURE — 36415 COLL VENOUS BLD VENIPUNCTURE: CPT

## 2022-04-22 PROCEDURE — 99233 SBSQ HOSP IP/OBS HIGH 50: CPT | Performed by: INTERNAL MEDICINE

## 2022-04-22 PROCEDURE — 77010033678 HC OXYGEN DAILY

## 2022-04-22 RX ORDER — OXYCODONE AND ACETAMINOPHEN 5; 325 MG/1; MG/1
2 TABLET ORAL
Status: DISCONTINUED | OUTPATIENT
Start: 2022-04-22 | End: 2022-04-28 | Stop reason: HOSPADM

## 2022-04-22 RX ADMIN — FUROSEMIDE 40 MG: 10 INJECTION, SOLUTION INTRAMUSCULAR; INTRAVENOUS at 09:57

## 2022-04-22 RX ADMIN — Medication: at 09:50

## 2022-04-22 RX ADMIN — ENOXAPARIN SODIUM 40 MG: 40 INJECTION SUBCUTANEOUS at 09:53

## 2022-04-22 RX ADMIN — Medication 2 UNITS: at 08:27

## 2022-04-22 RX ADMIN — CASTOR OIL AND BALSAM, PERU: 788; 87 OINTMENT TOPICAL at 09:51

## 2022-04-22 RX ADMIN — Medication 3 UNITS: at 21:49

## 2022-04-22 RX ADMIN — OXYCODONE HYDROCHLORIDE AND ACETAMINOPHEN 1 TABLET: 5; 325 TABLET ORAL at 04:41

## 2022-04-22 RX ADMIN — ARFORMOTEROL TARTRATE: 15 SOLUTION RESPIRATORY (INHALATION) at 20:00

## 2022-04-22 RX ADMIN — SODIUM CHLORIDE, PRESERVATIVE FREE 10 ML: 5 INJECTION INTRAVENOUS at 05:34

## 2022-04-22 RX ADMIN — PERFLUTREN 2 ML: 6.52 INJECTION, SUSPENSION INTRAVENOUS at 11:29

## 2022-04-22 RX ADMIN — ARFORMOTEROL TARTRATE: 15 SOLUTION RESPIRATORY (INHALATION) at 07:57

## 2022-04-22 RX ADMIN — OXYCODONE AND ACETAMINOPHEN 2 TABLET: 5; 325 TABLET ORAL at 21:29

## 2022-04-22 RX ADMIN — OXYCODONE AND ACETAMINOPHEN 2 TABLET: 5; 325 TABLET ORAL at 09:49

## 2022-04-22 RX ADMIN — METHYLPREDNISOLONE SODIUM SUCCINATE 40 MG: 40 INJECTION, POWDER, FOR SOLUTION INTRAMUSCULAR; INTRAVENOUS at 21:32

## 2022-04-22 RX ADMIN — IPRATROPIUM BROMIDE AND ALBUTEROL SULFATE 3 ML: .5; 3 SOLUTION RESPIRATORY (INHALATION) at 19:59

## 2022-04-22 RX ADMIN — MICONAZOLE NITRATE: 20 CREAM TOPICAL at 09:52

## 2022-04-22 RX ADMIN — Medication 2 UNITS: at 17:18

## 2022-04-22 RX ADMIN — METHYLPREDNISOLONE SODIUM SUCCINATE 40 MG: 40 INJECTION, POWDER, FOR SOLUTION INTRAMUSCULAR; INTRAVENOUS at 05:49

## 2022-04-22 RX ADMIN — CASTOR OIL AND BALSAM, PERU: 788; 87 OINTMENT TOPICAL at 21:35

## 2022-04-22 RX ADMIN — IPRATROPIUM BROMIDE AND ALBUTEROL SULFATE 3 ML: .5; 3 SOLUTION RESPIRATORY (INHALATION) at 07:56

## 2022-04-22 RX ADMIN — ACETAMINOPHEN 325MG 650 MG: 325 TABLET ORAL at 14:15

## 2022-04-22 RX ADMIN — Medication: at 17:19

## 2022-04-22 RX ADMIN — IPRATROPIUM BROMIDE AND ALBUTEROL SULFATE 3 ML: .5; 3 SOLUTION RESPIRATORY (INHALATION) at 11:24

## 2022-04-22 RX ADMIN — SODIUM CHLORIDE, PRESERVATIVE FREE 10 ML: 5 INJECTION INTRAVENOUS at 21:37

## 2022-04-22 RX ADMIN — METOPROLOL TARTRATE 25 MG: 25 TABLET, FILM COATED ORAL at 09:48

## 2022-04-22 RX ADMIN — DOXYCYCLINE 100 MG: 100 INJECTION, POWDER, LYOPHILIZED, FOR SOLUTION INTRAVENOUS at 21:32

## 2022-04-22 RX ADMIN — MICONAZOLE NITRATE: 20 CREAM TOPICAL at 21:36

## 2022-04-22 RX ADMIN — METHYLPREDNISOLONE SODIUM SUCCINATE 40 MG: 40 INJECTION, POWDER, FOR SOLUTION INTRAMUSCULAR; INTRAVENOUS at 14:13

## 2022-04-22 RX ADMIN — DOXYCYCLINE 100 MG: 100 INJECTION, POWDER, LYOPHILIZED, FOR SOLUTION INTRAVENOUS at 09:57

## 2022-04-22 RX ADMIN — FUROSEMIDE 40 MG: 10 INJECTION, SOLUTION INTRAMUSCULAR; INTRAVENOUS at 17:19

## 2022-04-22 RX ADMIN — Medication 3 UNITS: at 11:36

## 2022-04-22 RX ADMIN — SODIUM CHLORIDE, PRESERVATIVE FREE 20 ML: 5 INJECTION INTRAVENOUS at 14:13

## 2022-04-22 RX ADMIN — ATORVASTATIN CALCIUM 10 MG: 10 TABLET, FILM COATED ORAL at 21:32

## 2022-04-22 RX ADMIN — OXYCODONE AND ACETAMINOPHEN 2 TABLET: 5; 325 TABLET ORAL at 15:59

## 2022-04-22 RX ADMIN — METOPROLOL TARTRATE 25 MG: 25 TABLET, FILM COATED ORAL at 17:19

## 2022-04-22 RX ADMIN — IPRATROPIUM BROMIDE AND ALBUTEROL SULFATE 3 ML: .5; 3 SOLUTION RESPIRATORY (INHALATION) at 16:01

## 2022-04-22 RX ADMIN — ASPIRIN 81 MG: 81 TABLET, CHEWABLE ORAL at 09:47

## 2022-04-22 NOTE — WOUND CARE
Wound care follow up for the left buttock wound:  Chart reviewed. Nursing was in the room cleaning the wound. I just assisted and reassessed the wound. The bleeding is now controlled. Wound care changed to [facility] Blue packing and cover with a foam dressing - to be done daily. Pt. Is on the Size Wise Bed with low air loss and a hercules repositioning sheet. Heels are floated. Plan: Continue the wound care already written. Wound care nurse to reassess on Monday.    Sharri Marks RN, BSN, [facility]

## 2022-04-22 NOTE — PROGRESS NOTES
[address] [facility], [address] [facility]  [phone]      Cardiology Progress Note      4/22/2022 11:26 AM    Admit Date: 4/21/2022    Admit Diagnosis:   Hypoxia [R09.02]  CHF exacerbation (HCC) [I50.9]  COPD exacerbation ([address] [J44.1]  Physical deconditioning [R53.81]    Subjective: Malachi Swain is laying in bed, in process of getting her ECHO completed. She reports \"I'm not good, well I guess I'm ok\". Still slightly ST, Hypertensive. Labs steady.    I/O -2.2L     Visit Vitals  BP (!) 157/68   Pulse (!) 104   Temp 98.3 °F (36.8 °C)   Resp 30   Ht 5' 3\" (1.6 m)   Wt 137 kg (302 lb 0.5 oz)   SpO2 93%   BMI 53.50 kg/m²       Current Facility-Administered Medications   Medication Dose Route Frequency    oxyCODONE-acetaminophen (PERCOCET) 5-325 mg per tablet 2 Tablet  2 Tablet Oral Q6H PRN    perflutren lipid microspheres (DEFINITY) contrast injection 2 mL  2 mL IntraVENous ONCE    sodium chloride (NS) flush 5-40 mL  5-40 mL IntraVENous Q8H    sodium chloride (NS) flush 5-40 mL  5-40 mL IntraVENous PRN    albuterol-ipratropium (DUO-NEB) 2.5 MG-0.5 MG/3 ML  3 mL Nebulization QID RT    aspirin chewable tablet 81 mg  81 mg Oral DAILY    atorvastatin (LIPITOR) tablet 10 mg  10 mg Oral QHS    arformoterol 15 mcg/budesonide 0.25 mg neb solution   Nebulization BID RT    metoprolol tartrate (LOPRESSOR) tablet 25 mg  25 mg Oral BID    methylPREDNISolone (PF) (SOLU-MEDROL) injection 40 mg  40 mg IntraVENous Q8H    furosemide (LASIX) injection 40 mg  40 mg IntraVENous BID    albuterol (PROVENTIL VENTOLIN) nebulizer solution 2.5 mg  2.5 mg Inhalation Q4H PRN    doxycycline (VIBRAMYCIN) 100 mg in 0.9% sodium chloride (MBP/ADV) 100 mL MBP  100 mg IntraVENous Q12H    acetaminophen (TYLENOL) tablet 650 mg  650 mg Oral Q6H PRN    Or    acetaminophen (TYLENOL) suppository 650 mg  650 mg Rectal Q6H PRN    polyethylene glycol (MIRALAX) packet 17 g  17 g Oral DAILY PRN    ondansetron (ZOFRAN ODT) tablet 4 mg  4 mg Oral Q8H PRN    Or    ondansetron (ZOFRAN) injection 4 mg  4 mg IntraVENous Q6H PRN    enoxaparin (LOVENOX) injection 40 mg  40 mg SubCUTAneous DAILY    insulin lispro (HUMALOG) injection   SubCUTAneous AC&HS    glucose chewable tablet 16 g  4 Tablet Oral PRN    glucagon (GLUCAGEN) injection 1 mg  1 mg IntraMUSCular PRN    dextrose 10% infusion 0-250 mL  0-250 mL IntraVENous PRN    hydrALAZINE (APRESOLINE) 20 mg/mL injection 20 mg  20 mg IntraVENous Q6H PRN    ammonium lactate (LAC-HYDRIN) 12 % lotion   Topical BID    miconazole (SECURA) 2 % extra thick cream   Topical BID    balsam peru-castor oiL (VENELEX) ointment   Topical BID       Objective:      Physical Assessment:   General Appearance:  alert, cooperative, morbidly obese  female in NAD, appears older than stated age   Eyes: sclera anicteric  Mouth/Throat: moist mucous membranes; oral pharynx clear  Neck: supple; no JVD or bruit  Pulmonary:  Diminished to auscultation bilaterally; good effort  Cardiovascular: rapid rate and regular rhythm; no murmur, click, rub, or gallop  Abdomen: soft, non-tender, non-distended; bowel sounds normal  Musculoskeletal: no swelling or deformity; moves all extremities  Extremities: 3+ edema; palpable distal pulses   Skin: thick, leathery skin to lower extremities, sores   Neuro: grossly normal  Psych: normal mood and affect given the setting         Data Review:   Recent Labs     04/22/22  0131 04/21/22  0110   WBC 16.4* 11.1*   HGB 12.7 13.4   HCT 42.4 46.2    214     Recent Labs     04/22/22  0131 04/21/22  0110   * 136   K 4.4 4.8   CL 94* 97   CO2 36* 36*   * 187*   BUN 20 14   CREA 0.63 0.57   CA 9.3 9.3   MG 2.2  --    ALB 2.3* 2.4*   TBILI 0.9 0.4   ALT 27 30       Recent Labs     04/21/22  0531 04/21/22  0110   TROPHS 24 12         Intake/Output Summary (Last 24 hours) at 4/22/2022 1126  Last data filed at 4/21/2022 1924  Gross per 24 hour   Intake 50 ml   Output 2300 ml   Net -2250 ml      Cardiographics     Telemetry: SR/ST   Echocardiogram: new pending   CXRAY: \"Moderately severe pulmonary edema, right upper lobe atelectasis, and  small bilateral pleural effusions. \"    Assessment:     Active Problems:    Physical deconditioning (12/4/2020)      Hypoxia (4/21/2022)      COPD exacerbation ([address] (4/21/2022)      CHF exacerbation ([address] (4/21/2022)        Plan:   Acute CHF exacerbation:   Pulmonary Edema seen on CXray:  Morbid Obesity:  COPD:  JOSLYN:  Continue BB, ASA, statin  IV Lasix 40 mg BID  Strict I/O (-2.2L), labs, daily weights   Cardiac cath 2020-no obstructive CAD  Nebs/steroids/tx and bipap as per internal medicine  Repeat ECHO  EKG ST no acute changes   At baseline on 4L/NC O2 needs, currently on 5L/NC     Hx Afib/flutter:  Continue BB, currently NSR/ST  Hx prior cardioversions  On ASA inpatient-did not take PTA for reported nose bleeds   No acute nosebleeds while inpatient, monitor      HLD:  Continue statin     HTN:   Controlled on BB      Corey Beverly, MOISE   DNP,APRN,AG-ACNP-BC       Patient seen and examined by me with nurse practitioner. I personally performed all components of the history, physical, and medical decision making and agree with the assessment and plan as noted. F/u Echo. Morbid obesity major contributing factor.      Chrissy Vail MD

## 2022-04-22 NOTE — CONSULTS
Pulmonary, Critical Care, and Sleep Medicine      Name: Bravo Prasad MRN: [id number]   : 1960 Hospital: [facility]   Date: 2022  Admission date: 2022 Hospital Day: 2       History:   Pt is unstable and acutely ill. Medical records and data reviewed. Pt seen in Consultation        IMPRESSION:   1. Acute chronic respiratory failure with Hypoxia with chronic Hypercapnea; home O2, NIV  2. Likely Asthma (vs COPD) with exacerbation, no PFTs available; normal eosinophils, nl proBNP  3. Former smoker  4. Suspected JOSLYN and /or OHS  5. (COPD OHS Thoracic Restrictive disease ) Restrictive disease from obesity J84.4 and E 66.8  6. Afib, refractory  7. DM   8. Non-ambulatory at baseline due to arthritis, Obesity, SOB   9. Body mass index is 53.5 kg/m². 10. Prognosis guarded       RECOMMENDATIONS/PLAN:   1. Supplemental O2 to keep sats > 90%  2. If pt declines, would r/o COVID or viral infection  3. Continue NIV at bedtime and prn to prevent worsening respiratory acidosis  4. Will ask Case management to assign pt home visiting doc, PCP  5. Will ask Case management to ask [employer] to service her NIV at home  6. F/u  after discharge  7. Aspiration precautions  8. Bronchial hygiene with respiratory therapy techniques, bronchodilators  9. DVT prophylaxis   10. Prescription drug management with home med reconciliation reviewed  11. Will see her over weekend prn          [x] High complexity decision making was performed  [x] See my orders for details      Subjective/Initial History:     I was asked by Juan Francisco Frances MD to see Bravo Prasad  a 57 y.o.    female in consultation for a chief complaint of respiratory failure    Excerpts from admission 2022 or consult notes as follows:   \" rBavo Prasad is a 57 y.o. female, with past medical history of chronic hypoxic and hypercapnic respiratory failure with suspected COPD on 4 L nasal cannula at baseline, probable JOSLYN and obesity hypoventilation syndrome on CPAP at night, morbid obesity, refractory A. fib, CHF with normal ejection fraction on Lasix at home 40 mg daily, DM2, HTN, and HLD  who presents via  EMS to the ED with c/o shortness of breath. EMS reported patient was satting at 88% on her home oxygen (3 L.)  Reports having rapid progression of shortness of breath with chest tightness since earlier in the evening. Per report patient is bedbound for some time now. Per EMS report patient home was in disarray and patient may need to PT OT and  consult to assess home situation. \"    Patient has chronic hypoxic/hypercapnic respiratory failure on home O2, suspected COPD, afib, CHF, morbid Obesity who is bedbound if not housebound. She has not been able to have outpatient evaluation due to immobility. She was discharged home December 2020 with NIV Trilogy per  orders and supposed to have followup in office. Never able to get to office. Patient denies fever, chills,  productive cough. No COVID symptoms. Denies sick contact. Chart reviewed. Met with pt at bedside and then called daughter. She lives with daughter, granddaughter. Has no PCP. Dr. Leo Meth saw her once to renew meds over a year ago. Has not seen her since. O2 from [place]. NIV from [address] [place] [address] but we have no communication from [facility] since prescribed. Daughter has a smart phone but has never done a virtual visit. Daughter cannot drive and cannot bring in pt's Trilogy. Mortimer Nicks says mother wears mask most nights. Pt's sister, [place], lived there but recently passed away last month. She has not been able to ambulate for over a year due to knee pain. Also has a bad right wrist and bad knees. 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 She has a nebulizer, hospital bed, and wheelchair. Pt is FULL code.      Allergies   Allergen Reactions    Cefazolin Rash    Other Plant, Animal, Environmental Rash Ivory soap caused rash on hands. MAR reviewed and pertinent medications noted or modified as needed     Current Facility-Administered Medications   Medication    oxyCODONE-acetaminophen (PERCOCET) 5-325 mg per tablet 2 Tablet    sodium chloride (NS) flush 5-40 mL    sodium chloride (NS) flush 5-40 mL    albuterol-ipratropium (DUO-NEB) 2.5 MG-0.5 MG/3 ML    aspirin chewable tablet 81 mg    atorvastatin (LIPITOR) tablet 10 mg    arformoterol 15 mcg/budesonide 0.25 mg neb solution    metoprolol tartrate (LOPRESSOR) tablet 25 mg    methylPREDNISolone (PF) (SOLU-MEDROL) injection 40 mg    furosemide (LASIX) injection 40 mg    albuterol (PROVENTIL VENTOLIN) nebulizer solution 2.5 mg    doxycycline (VIBRAMYCIN) 100 mg in 0.9% sodium chloride (MBP/ADV) 100 mL MBP    acetaminophen (TYLENOL) tablet 650 mg    Or    acetaminophen (TYLENOL) suppository 650 mg    polyethylene glycol (MIRALAX) packet 17 g    ondansetron (ZOFRAN ODT) tablet 4 mg    Or    ondansetron (ZOFRAN) injection 4 mg    enoxaparin (LOVENOX) injection 40 mg    insulin lispro (HUMALOG) injection    glucose chewable tablet 16 g    glucagon (GLUCAGEN) injection 1 mg    dextrose 10% infusion 0-250 mL    hydrALAZINE (APRESOLINE) 20 mg/mL injection 20 mg    ammonium lactate (LAC-HYDRIN) 12 % lotion    miconazole (SECURA) 2 % extra thick cream    balsam peru-castor oiL (VENELEX) ointment      Patient PCP: Unknown, Provider, MD  PMH:  has a past medical history of Acute on chronic respiratory failure with hypoxia and hypercapnia ([address] (11/20/2020), Atrial fibrillation ([address] (11/4/2020), CHF (congestive heart failure) ([address] Diabetes ([address] Hypertension, Ill-defined condition, Ill-defined condition, JOSLYN (obstructive sleep apnea) (11/20/2020), Other and unspecified symptoms and signs involving general sensations and perceptions, and S/P cardiac cath (11/4/2020).   PSH:   has a past surgical history that includes pr patiencee barak vargas abltj 3d mapg tx svt (N/A, 2020); pr icar catheter ablation arrhythmia add on (N/A, 2020); pr intracardiac electrophysiologic 3d mapping (N/A, 2020); pr compre electrophysiol xm w/left atrial pacng/rec (N/A, 2020); and pr stim/pacing heart post iv drug infu (N/A, 2020). FHX: family history is not on file. SHX:  reports that she has quit smoking. She has never used smokeless tobacco. She reports that she does not drink alcohol and does not use drugs. ROS:A comprehensive review of systems was negative except for that written in the HPI. Objective:     Vital Signs: Telemetry:    normal sinus rhythm Intake/Output:   Visit Vitals  BP (!) 157/68 (BP 1 Location: Right upper arm, BP Patient Position: At rest;Supine)   Pulse (!) 104   Temp 98.3 °F (36.8 °C)   Resp 25   Ht 5' 3\" (1.6 m)   Wt 137 kg (302 lb)   SpO2 92%   BMI 53.50 kg/m²       Temp (24hrs), Av.4 °F (36.9 °C), Min:98 °F (36.7 °C), Max:98.8 °F (37.1 °C)        O2 Device: Nasal cannula O2 Flow Rate (L/min): 5 l/min       Wt Readings from Last 4 Encounters:   22 137 kg (302 lb)   20 131.5 kg (290 lb)   20 136 kg (299 lb 13.2 oz)   20 138.1 kg (304 lb 8 oz)          Intake/Output Summary (Last 24 hours) at 2022 1054  Last data filed at 2022 1924  Gross per 24 hour   Intake 50 ml   Output 2300 ml   Net -2250 ml       Last shift:      No intake/output data recorded. Last 3 shifts:  190 -  0700  In: 50 [P.O.:50]  Out: 2300 [Urine:2300]       Physical Exam:   General:  female; moderately ill;  NC;  HEENT: NCAT, poor dentition, lips and mucosa dry  Eyes: anicteric; conjunctiva clear  Neck: no nodes, no accessory MM use. Chest: no deformity,   Cardiac: regular rate, rhythm; no murmur  Lungs: distant breath sounds; no wheezes, no rales, no rhonchi  Abd: soft, NT, hypoactive BS, OBESE,  Ext: no edema; no joint swelling;  No clubbing  : No irwin,   Neuro: fluent,  follows commands; generalized weakness  Psych- no agitation, oriented to person; unable to assess  Skin: warm, dry, no cyanosis;   Pulses: 1-2+ Bilateral pedal, radial  Capillary: brisk; pale    Labs:    Recent Labs     04/22/22  0131 04/21/22  0110   WBC 16.4* 11.1*   HGB 12.7 13.4    214     Recent Labs     04/22/22  0131 04/21/22  0110   * 136   K 4.4 4.8   CL 94* 97   CO2 36* 36*   * 187*   BUN 20 14   CREA 0.63 0.57   CA 9.3 9.3   MG 2.2  --    ALB 2.3* 2.4*   ALT 27 30     Recent Labs     04/21/22  0750   PH 7.37   PCO2 64*   PO2 79*   HCO3 36*     No results for input(s): CPK, CKNDX, TROIQ in the last 72 hours. No lab exists for component: CPKMB  Lab Results   Component Value Date/Time    BNP 14 04/19/2009 05:00 PM      Lab Results   Component Value Date/Time    Culture result:  04/21/2022 05:31 AM     >2 ORGANISMS - CONTAMINATED SPECIMEN. SUGGEST RECOLLECTION    Culture result: NO GROWTH 6 DAYS 11/18/2020 07:59 PM    Culture result: NO GROWTH 6 DAYS 11/18/2020 07:48 PM     Lab Results   Component Value Date/Time    TSH 1.88 09/08/2014 02:20 PM       Imaging:    CXR Results  (Last 48 hours)               04/21/22 0242  XR CHEST PORT Final result    Impression:  Moderately severe pulmonary edema, right upper lobe atelectasis, and   small bilateral pleural effusions. Narrative:  INDICATION: Shortness of breath       COMPARISON: 12/2/2020       FINDINGS: AP portable imaging of the chest performed at 2:31 AM demonstrates a   stable cardiomediastinal silhouette. There is moderately severe pulmonary   interstitial edema with right upper lobe atelectasis. Small bilateral pleural   effusions are present. No significant osseous abnormalities are seen.                Results from Hospital Encounter encounter on 04/21/22    XR CHEST PORT    Narrative  INDICATION: Shortness of breath    COMPARISON: 12/2/2020    FINDINGS: AP portable imaging of the chest performed at 2:31 AM demonstrates a  stable cardiomediastinal silhouette. There is moderately severe pulmonary  interstitial edema with right upper lobe atelectasis. Small bilateral pleural  effusions are present. No significant osseous abnormalities are seen. Impression  Moderately severe pulmonary edema, right upper lobe atelectasis, and  small bilateral pleural effusions. Results from [place] encounter on 12/02/20    XR CHEST PORT    Narrative  INDICATION:  SOB    EXAM: Chest single view. COMPARISON: 11/28/2020 chest x-ray, 8/14/2020 chest CT. FINDINGS: A single frontal view of the chest at 1725 hours shows significant  persistent perihilar interstitial opacities with possible small bilateral  pleural effusions, although effusions are difficult to confirm. . Bibasilar  atelectasis. .  The heart, mediastinum and pulmonary vasculature are stable with  mild enlargement . The bony thorax is unremarkable for age. .    Impression  IMPRESSION:  Perihilar interstitial prominence with stable mild parenchymal opacification  suggesting edema or small airways disease. Bibasilar atelectasis. .  . Results from [place] encounter on 11/18/20    XR CHEST PORT    Narrative  EXAM: XR CHEST PORT    INDICATION: interval changes    COMPARISON: 11/18/2020    FINDINGS: A portable AP radiograph of the chest was obtained at 0550 hours. The  patient is on a cardiac monitor. There is no change in cardiomegaly. The lungs  demonstrate improved aeration with decrease in the interstitial and patchy  airspace opacities especially in the right midlung zone. No pleural effusions. Impression  IMPRESSION:  1. Improved aeration with decreasing interstitial and airspace opacities    Results from [place] encounter on 12/02/20    CTA CHEST W OR W WO CONT    Narrative  EXAM: CT ANGIOGRAPHY CHEST    INDICATION:  sob    COMPARISON: 8/14/2020. CONTRAST: 100 mL of Isovue-370.     TECHNIQUE:  Precontrast  images were obtained to localize the volume for acquisition. Multislice helical CT arteriography was performed from the diaphragm to the  thoracic inlet during uneventful rapid bolus intravenous contrast  administration. Lung and soft tissue windows were generated. Coronal and  sagittal  reformatted images were also generated. 3D post processing consisting  of coronal maximum intensity projection images was performed. CT dose reduction  was achieved through use of a standardized protocol tailored for this  examination and automatic exposure control for dose modulation. FINDINGS:  Patchy diffuse mosaic attenuation of lung parenchyma is stable since the prior  study 4 months ago, with bibasilar atelectasis or scar which is also stable. .    The pulmonary arteries are well enhanced and no pulmonary emboli are identified. Main pulmonary outflow tract measures 3.2 cm    There is no mediastinal or hilar adenopathy or mass. The aorta enhances normally  without evidence of aneurysm or dissection. The visualized portions of the upper abdominal organs are normal.    Impression  IMPRESSION:  1. No CT evidence for central pulmonary embolus at this time . 2. Stable patchy diffuse mosaic attenuation of lung parenchyma with bibasilar  atelectasis since 4 months ago, probably more suggestive of chronic small  airways lung disease, although atypical infection may present in a similar  fashion. 3. Borderline size of the main pulmonary outflow tract.  Correlate for pulmonary  hypertension      This care involved high complexity medical decision making: I personally:  · Reviewed the flowsheet and previous days notes  · Reviewed and summarized records or history from previous days note or discussions with staff, family  · High Risk Drug therapy requiring intensive monitoring for toxicity: eg steroids, antibiotics  · Reviewed and/or ordered Clinical lab tests  · Reviewed images and/or ordered Radiology tests  · Reviewed the patients / Telemetry  · Reviewed and/or adjusted NiPPV settings  · discussed my assessment/management with : Nursing, Daughter Remi Quiles for coordination of care    Monica Lomax MD

## 2022-04-22 NOTE — PROGRESS NOTES
Hospitalist Progress Note    NAME: Ashley Pena   :  1960   MRN:  [id number]     Subjective:   Daily Progress Note: 2022 7:53 AM      Chief complaint: Shortness of breath  Patient seen and examined, chart was reviewed. Patient having pain and asking for pain medications. Patient remains on 4 L nasal cannula. No other acute issues reported to me by patient or staff at this time.     Current Facility-Administered Medications   Medication Dose Route Frequency    sodium chloride (NS) flush 5-40 mL  5-40 mL IntraVENous Q8H    sodium chloride (NS) flush 5-40 mL  5-40 mL IntraVENous PRN    albuterol-ipratropium (DUO-NEB) 2.5 MG-0.5 MG/3 ML  3 mL Nebulization QID RT    aspirin chewable tablet 81 mg  81 mg Oral DAILY    atorvastatin (LIPITOR) tablet 10 mg  10 mg Oral QHS    arformoterol 15 mcg/budesonide 0.25 mg neb solution   Nebulization BID RT    metoprolol tartrate (LOPRESSOR) tablet 25 mg  25 mg Oral BID    methylPREDNISolone (PF) (SOLU-MEDROL) injection 40 mg  40 mg IntraVENous Q8H    furosemide (LASIX) injection 40 mg  40 mg IntraVENous BID    albuterol (PROVENTIL VENTOLIN) nebulizer solution 2.5 mg  2.5 mg Inhalation Q4H PRN    doxycycline (VIBRAMYCIN) 100 mg in 0.9% sodium chloride (MBP/ADV) 100 mL MBP  100 mg IntraVENous Q12H    acetaminophen (TYLENOL) tablet 650 mg  650 mg Oral Q6H PRN    Or    acetaminophen (TYLENOL) suppository 650 mg  650 mg Rectal Q6H PRN    polyethylene glycol (MIRALAX) packet 17 g  17 g Oral DAILY PRN    ondansetron (ZOFRAN ODT) tablet 4 mg  4 mg Oral Q8H PRN    Or    ondansetron (ZOFRAN) injection 4 mg  4 mg IntraVENous Q6H PRN    enoxaparin (LOVENOX) injection 40 mg  40 mg SubCUTAneous DAILY    insulin lispro (HUMALOG) injection   SubCUTAneous AC&HS    glucose chewable tablet 16 g  4 Tablet Oral PRN    glucagon (GLUCAGEN) injection 1 mg  1 mg IntraMUSCular PRN    dextrose 10% infusion 0-250 mL  0-250 mL IntraVENous PRN    hydrALAZINE (APRESOLINE) 20 mg/mL injection 20 mg  20 mg IntraVENous Q6H PRN    oxyCODONE-acetaminophen (PERCOCET) 5-325 mg per tablet 1 Tablet  1 Tablet Oral Q6H PRN    ammonium lactate (LAC-HYDRIN) 12 % lotion   Topical BID    miconazole (SECURA) 2 % extra thick cream   Topical BID    balsam peru-castor oiL (VENELEX) ointment   Topical BID          Objective:     Visit Vitals  /69 (BP 1 Location: Right upper arm, BP Patient Position: At rest)   Pulse (!) 106   Temp 98.3 °F (36.8 °C)   Resp 19   Ht 5' 3\" (1.6 m)   Wt 137 kg (302 lb)   SpO2 90%   BMI 53.50 kg/m²    O2 Flow Rate (L/min): 4 l/min O2 Device: Nasal cannula    Temp (24hrs), Av.4 °F (36.9 °C), Min:98 °F (36.7 °C), Max:98.8 °F (37.1 °C)        PHYSICAL EXAM:  General chronically ill looking obese  Neck Short  CVS tachycardic  Respiratory symmetric expansion  Abdomen morbidly obese, soft  Extremities 3+ edema  Neuro alert, normal speech  Psych flat affect  Skin rash in groin area        Data Review    Recent Results (from the past 24 hour(s))   GLUCOSE, POC    Collection Time: 22 11:11 AM   Result Value Ref Range    Glucose (POC) 244 (H) 65 - 117 mg/dL    Performed by Kailee Maloney    GLUCOSE, POC    Collection Time: 22  6:22 PM   Result Value Ref Range    Glucose (POC) 137 (H) 65 - 117 mg/dL    Performed by Adrianna Krishnamurthy PCT    GLUCOSE, POC    Collection Time: 22  9:57 PM   Result Value Ref Range    Glucose (POC) 120 (H) 65 - 117 mg/dL    Performed by Layne Chamorro RN    METABOLIC PANEL, COMPREHENSIVE    Collection Time: 22  1:31 AM   Result Value Ref Range    Sodium 132 (L) 136 - 145 mmol/L    Potassium 4.4 3.5 - 5.1 mmol/L    Chloride 94 (L) 97 - 108 mmol/L    CO2 36 (H) 21 - 32 mmol/L    Anion gap 2 (L) 5 - 15 mmol/L    Glucose 184 (H) 65 - 100 mg/dL    BUN 20 6 - 20 MG/DL    Creatinine 0.63 0.55 - 1.02 MG/DL    BUN/Creatinine ratio 32 (H) 12 - 20      GFR est AA >60 >60 ml/min/1.73m2    GFR est non-AA >60 >60 ml/min/1.73m2 Calcium 9.3 8.5 - 10.1 MG/DL    Bilirubin, total 0.9 0.2 - 1.0 MG/DL    ALT (SGPT) 27 12 - 78 U/L    AST (SGOT) 48 (H) 15 - 37 U/L    Alk. phosphatase 120 (H) 45 - 117 U/L    Protein, total 7.9 6.4 - 8.2 g/dL    Albumin 2.3 (L) 3.5 - 5.0 g/dL    Globulin 5.6 (H) 2.0 - 4.0 g/dL    A-G Ratio 0.4 (L) 1.1 - 2.2     MAGNESIUM    Collection Time: 04/22/22  1:31 AM   Result Value Ref Range    Magnesium 2.2 1.6 - 2.4 mg/dL   CBC W/O DIFF    Collection Time: 04/22/22  1:31 AM   Result Value Ref Range    WBC 16.4 (H) 3.6 - 11.0 K/uL    RBC 4.11 3.80 - 5.20 M/uL    HGB 12.7 11.5 - 16.0 g/dL    HCT 42.4 35.0 - 47.0 %    .2 (H) 80.0 - 99.0 FL    MCH 30.9 26.0 - 34.0 PG    MCHC 30.0 30.0 - 36.5 g/dL    RDW 15.5 (H) 11.5 - 14.5 %    PLATELET 423 140 - 378 K/uL    MPV 12.2 8.9 - 12.9 FL    NRBC 0.0 0  WBC    ABSOLUTE NRBC 0.00 0.00 - 0.01 K/uL     No results found for this visit on 04/21/22. All Micro Results     Procedure Component Value Units Date/Time    CULTURE, URINE [[id number]] Collected: 04/21/22 0531    Order Status: Completed Specimen: Urine Updated: 04/22/22 0719     Special Requests: --        NO SPECIAL REQUESTS  Reflexed from [id number]       Edwards Count --        >100,000  COLONIES/mL       Culture result:       >2 ORGANISMS - CONTAMINATED SPECIMEN. SUGGEST RECOLLECTION            Chest x-ray- IMPRESSION  Moderately severe pulmonary edema, right upper lobe atelectasis, and  small bilateral pleural effusions.       Assessment/Plan:   Acute respiratory distress now resolved  Hypoxic respiratory failure  now improved, off nonrebreather  CHF exacerbation-improving  Pulmonary edema-improving  Bilateral pleural effusion-suspect chronic from CHF  Physical deconditioning-would need aggressive PT OT  Probable COPD exacerbation-improving on iv steroids get Pulm eval  Probable JOSLYN/OHS and OHS on CPAP at night  History of COPD on 4 L nasal cannula oxygen 24/7 at home at baseline  Morbid obesity   Impaired mobility     Left buttock wounds-continue local wound care, general surgery evaluation requested by previous providers. Pain controlled with medications adjusted today.     H/O P A.  Fib/CHF last EF 60%  -Per patient she is not on any blood thinner and not taking aspirin because she getting nosebleeds with aspirin  -Multiple prior cardioversion procedures  - f/u Echo  -Cardiology on case follow recommendations        DM2 vs steroid induced hyperglycemia  -does not appear to be on medications, A1C 6.0 7/2020  -since she will be on steroids,  Cont sliding scale,       A1c level 5.4     Hypertension    controlled       Morbid obesity/Impaired mobility -reportedly mostly bedbound at home     Abnormal UA- suspect contaminant external recement, follow-up urine cultures, already on antibiotics      Code Status: Full  Surrogate Decision Jocelyn Russo, daughter                 Kianna Cadet Child [phone]       DVT Prophylaxis:  Subcu Lovenox  Dispo: TBD        Signed By: Froilan Alba MD     April 22, 2022

## 2022-04-22 NOTE — PROGRESS NOTES
Transition of Care Plan:     RUR: 12%  Disposition: return home with daughter and [facility] (declines placement)   Follow up appointments: PCP, specialists as indicated   DME needed: needs new mattress for hospital bed   Transportation at Discharge: [facility] medical transport   [address] or means to access home:  yes      IM Medicare Letter: to be reviewed prior to d/c   Is patient a BCPI-A Bundle: No                    If yes, was Bundle Letter given?:    Is patient a [place] and connected with the [place]? No           If yes, was [employer] transfer form completed and [facility] notified? Caregiver Contact: Mitchel Radford (daughter) [phone]  Discharge Caregiver contacted prior to discharge? yes   Care Conference needed?: no    11:57 a.m. CM spoke with Janie to inquire about a new mattress. Raysa Tye asked that I send referral so she can look and determine if pt will qualify for a low pressure mattress. CM reviewed chart and previous CM's note. Pt has an TOM of 4/24/22. CM called and left a message with [facility] at [phone] to schedule appt for pt at d/c. Forensics saw pt during stay; however, pt declined evaluation. CM met with pt to discuss [facility] at d/c and pt is in agreement. FOC signed and placed on chart. CM will send referral to [address] [place] [address] per pt request. CM will call [place] at [phone] to find out pricing for a new mattress for pt and possible payment plan.     Helder Greer MSW  Care Management, [phone]

## 2022-04-22 NOTE — PROGRESS NOTES
End of Shift Note    Bedside shift change report given to RN (oncoming nurse) by Tessa Malik (offgoing nurse). Report included the following information SBAR, Kardex, Procedure Summary, Intake/Output, MAR and Recent Results    Shift worked:  7-7pm     Shift summary and any significant changes:     No significant changes. Pt on 5 L O2 during the day and BiPAP order at bedtime. Complains of constant leg pain. PRN meds are adm.      Concerns for physician to address:      Zone phone for oncoming shift:          Tessa Malik

## 2022-04-22 NOTE — PROGRESS NOTES
Problem: Falls - Risk of  Goal: *Absence of Falls  Description: Document Rossy Reyes Fall Risk and appropriate interventions in the flowsheet. Outcome: Progressing Towards Goal  Note: Fall Risk Interventions:  Mobility Interventions: Assess mobility with egress test,Bed/chair exit alarm,Communicate number of staff needed for ambulation/transfer,Mechanical lift,Patient to call before getting OOB,PT Consult for mobility concerns,PT Consult for assist device competence,Strengthening exercises (ROM-active/passive),Utilize walker, cane, or other assistive device         Medication Interventions: Assess postural VS orthostatic hypotension,Bed/chair exit alarm,Evaluate medications/consider consulting pharmacy,Teach patient to arise slowly,Patient to call before getting OOB    Elimination Interventions: Bed/chair exit alarm,Call light in reach,Patient to call for help with toileting needs,Stay With Me (per policy),Toilet paper/wipes in reach,Toileting schedule/hourly rounds              Problem: Patient Education: Go to Patient Education Activity  Goal: Patient/Family Education  Outcome: Progressing Towards Goal     Problem: Activity Intolerance  Goal: *Oxygen saturation during activity within specified parameters  Outcome: Progressing Towards Goal  Goal: *Able to remain out of bed as prescribed  Outcome: Progressing Towards Goal     Problem: Patient Education: Go to Patient Education Activity  Goal: Patient/Family Education  Outcome: Progressing Towards Goal     Problem: Diabetes Self-Management  Goal: *Disease process and treatment process  Description: Define diabetes and identify own type of diabetes; list 3 options for treating diabetes. Outcome: Progressing Towards Goal  Goal: *Incorporating nutritional management into lifestyle  Description: Describe effect of type, amount and timing of food on blood glucose; list 3 methods for planning meals.   Outcome: Progressing Towards Goal  Goal: *Incorporating physical activity into lifestyle  Description: State effect of exercise on blood glucose levels. Outcome: Progressing Towards Goal  Goal: *Developing strategies to promote health/change behavior  Description: Define the ABC's of diabetes; identify appropriate screenings, schedule and personal plan for screenings. Outcome: Progressing Towards Goal  Goal: *Using medications safely  Description: State effect of diabetes medications on diabetes; name diabetes medication taking, action and side effects. Outcome: Progressing Towards Goal  Goal: *Monitoring blood glucose, interpreting and using results  Description: Identify recommended blood glucose targets  and personal targets. Outcome: Progressing Towards Goal  Goal: *Prevention, detection, treatment of acute complications  Description: List symptoms of hyper- and hypoglycemia; describe how to treat low blood sugar and actions for lowering  high blood glucose level. Outcome: Progressing Towards Goal  Goal: *Prevention, detection and treatment of chronic complications  Description: Define the natural course of diabetes and describe the relationship of blood glucose levels to long term complications of diabetes. Outcome: Progressing Towards Goal  Goal: *Developing strategies to address psychosocial issues  Description: Describe feelings about living with diabetes; identify support needed and support network  Outcome: Progressing Towards Goal  Goal: *Insulin pump training  Outcome: Progressing Towards Goal  Goal: *Sick day guidelines  Outcome: Progressing Towards Goal  Goal: *Patient Specific Goal (EDIT GOAL, INSERT TEXT)  Outcome: Progressing Towards Goal     Problem: Patient Education: Go to Patient Education Activity  Goal: Patient/Family Education  Outcome: Progressing Towards Goal     Problem: Pressure Injury - Risk of  Goal: *Prevention of pressure injury  Description: Document Gaston Scale and appropriate interventions in the flowsheet.   Outcome: Progressing Towards Goal  Note: Pressure Injury Interventions:  Sensory Interventions: Assess changes in LOC,Assess need for specialty bed,Chair cushion,Keep linens dry and wrinkle-free,Monitor skin under medical devices,Pressure redistribution bed/mattress (bed type),Use 30-degree side-lying position,Turn and reposition approx. every two hours (pillows and wedges if needed)    Moisture Interventions: Absorbent underpads,Apply protective barrier, creams and emollients,Assess need for specialty bed,Internal/External urinary devices,Limit adult briefs,Maintain skin hydration (lotion/cream),Moisture barrier,Check for incontinence Q2 hours and as needed    Activity Interventions: Assess need for specialty bed,Chair cushion,Increase time out of bed,Pressure redistribution bed/mattress(bed type),PT/OT evaluation    Mobility Interventions: Assess need for specialty bed,Chair cushion,Float heels,Pressure redistribution bed/mattress (bed type),PT/OT evaluation,Suspension boots,Trapeze to reposition,Turn and reposition approx. every two hours(pillow and wedges)    Nutrition Interventions: Document food/fluid/supplement intake,Discuss nutritional consult with provider,Offer support with meals,snacks and hydration    Friction and Shear Interventions: Apply protective barrier, creams and emollients,Lift sheet,Lift team/patient mobility team,Minimize layers,Transfer aides:transfer board/Soheila lift/ceiling lift,Transferring/repositioning devices,Trapeze to reposition,Foam dressings/transparent film/skin sealants                Problem: Patient Education: Go to Patient Education Activity  Goal: Patient/Family Education  Outcome: Progressing Towards Goal     Problem: Impaired Skin Integrity/Pressure Injury Treatment  Goal: *Improvement of Existing Pressure Injury  Outcome: Progressing Towards Goal  Goal: *Prevention of pressure injury  Description: Document Gaston Scale and appropriate interventions in the flowsheet.   Outcome: Progressing Towards Goal  Note: Pressure Injury Interventions:  Sensory Interventions: Assess changes in LOC,Assess need for specialty bed,Chair cushion,Keep linens dry and wrinkle-free,Monitor skin under medical devices,Pressure redistribution bed/mattress (bed type),Use 30-degree side-lying position,Turn and reposition approx. every two hours (pillows and wedges if needed)    Moisture Interventions: Absorbent underpads,Apply protective barrier, creams and emollients,Assess need for specialty bed,Internal/External urinary devices,Limit adult briefs,Maintain skin hydration (lotion/cream),Moisture barrier,Check for incontinence Q2 hours and as needed    Activity Interventions: Assess need for specialty bed,Chair cushion,Increase time out of bed,Pressure redistribution bed/mattress(bed type),PT/OT evaluation    Mobility Interventions: Assess need for specialty bed,Chair cushion,Float heels,Pressure redistribution bed/mattress (bed type),PT/OT evaluation,Suspension boots,Trapeze to reposition,Turn and reposition approx. every two hours(pillow and wedges)    Nutrition Interventions: Document food/fluid/supplement intake,Discuss nutritional consult with provider,Offer support with meals,snacks and hydration    Friction and Shear Interventions: Apply protective barrier, creams and emollients,Lift sheet,Lift team/patient mobility team,Minimize layers,Transfer aides:transfer board/Soheila lift/ceiling lift,Transferring/repositioning devices,Trapeze to reposition,Foam dressings/transparent film/skin sealants                Problem: Patient Education: Go to Patient Education Activity  Goal: Patient/Family Education  Outcome: Progressing Towards Goal     Problem: Falls - Risk of  Goal: *Absence of Falls  Description: Document Anna Fall Risk and appropriate interventions in the flowsheet.   Outcome: Progressing Towards Goal  Note: Fall Risk Interventions:  Mobility Interventions: Assess mobility with egress test,Bed/chair exit alarm,Communicate number of staff needed for ambulation/transfer,Mechanical lift,Patient to call before getting OOB,PT Consult for mobility concerns,PT Consult for assist device competence,Strengthening exercises (ROM-active/passive),Utilize walker, cane, or other assistive device         Medication Interventions: Assess postural VS orthostatic hypotension,Bed/chair exit alarm,Evaluate medications/consider consulting pharmacy,Teach patient to arise slowly,Patient to call before getting OOB    Elimination Interventions: Bed/chair exit alarm,Call light in reach,Patient to call for help with toileting needs,Stay With Me (per policy),Toilet paper/wipes in reach,Toileting schedule/hourly rounds              Problem: Patient Education: Go to Patient Education Activity  Goal: Patient/Family Education  Outcome: Progressing Towards Goal     Problem: Chronic Obstructive Pulmonary Disease (COPD)  Goal: *Oxygen saturation during activity within specified parameters  Outcome: Progressing Towards Goal  Goal: *Able to remain out of bed as prescribed  Outcome: Progressing Towards Goal  Goal: *Absence of hypoxia  Outcome: Progressing Towards Goal  Goal: *Optimize nutritional status  Outcome: Progressing Towards Goal     Problem: Patient Education: Go to Patient Education Activity  Goal: Patient/Family Education  Outcome: Progressing Towards Goal     Problem: Surgical Wound Care  Goal: *Non-infected Wound: Absence of infection signs and symptoms  Description: Infection control procedures (eg: clean dressings, clean gloves, hand washing, precautions to isolate wound from contamination, sterile instruments used for wound debridement) should be implemented. Outcome: Progressing Towards Goal  Goal: *Infected Wound: Prevention of further infection and promotion of healing  Description: Consider the use of systemic antibiotics in patients with cellulitis, osteomyelitis, bacteremia, or sepsis if there are no contraindications.   Outcome: Progressing Towards Goal  Goal: *Improvement of existing wound and maintenance of skin integrity  Outcome: Progressing Towards Goal

## 2022-04-22 NOTE — PROGRESS NOTES
1900:  Bedside shift change report given to Mary Alexandre, RN (oncoming nurse) by [facility], RN (offgoing nurse). Report included the following information SBAR, Kardex, ED Summary, Procedure Summary, Intake/Output, MAR, Recent Results and Cardiac Rhythm NSR.    0700: End of Shift Note    Bedside shift change report given to Massiel RN (oncoming nurse) by To Butler RN (offgoing nurse). Report included the following information SBAR, Kardex, ED Summary, Procedure Summary, Intake/Output, MAR, Recent Results and Cardiac Rhythm NSR    Shift worked:  [id number]     Shift summary and any significant changes:    -Antifungal cream/lotion arrived from pharmacy. Both applied to wounds.     -Pt refused to be turned several times throughout shift. Pt refused to have heels off-loaded as well. Stated that knees hurt too bad to elevate feet up on a pillow. - PRN Percocet given for 10/10 pain in bilateral knees. Pt says pain stems from being turned and moved so much.     Concerns for physician to address:       Zone phone for oncoming shift:

## 2022-04-22 NOTE — PROGRESS NOTES
Problem: Falls - Risk of  Goal: *Absence of Falls  Description: Document Carl Boyce Fall Risk and appropriate interventions in the flowsheet. Outcome: Progressing Towards Goal  Note: Fall Risk Interventions:  Mobility Interventions: Bed/chair exit alarm,Strengthening exercises (ROM-active/passive),Utilize walker, cane, or other assistive device         Medication Interventions: Bed/chair exit alarm,Utilize gait belt for transfers/ambulation    Elimination Interventions: Call light in reach,Bed/chair exit alarm,Toileting schedule/hourly rounds              Problem: Patient Education: Go to Patient Education Activity  Goal: Patient/Family Education  Outcome: Progressing Towards Goal     Problem: Pressure Injury - Risk of  Goal: *Prevention of pressure injury  Description: Document Gaston Scale and appropriate interventions in the flowsheet. Outcome: Progressing Towards Goal  Note: Pressure Injury Interventions:  Sensory Interventions: Float heels,Keep linens dry and wrinkle-free,Maintain/enhance activity level,Minimize linen layers,Monitor skin under medical devices,Pad between skin to skin,Turn and reposition approx. every two hours (pillows and wedges if needed)    Moisture Interventions: Apply protective barrier, creams and emollients,Absorbent underpads,Check for incontinence Q2 hours and as needed,Internal/External urinary devices,Limit adult briefs,Maintain skin hydration (lotion/cream),Minimize layers,Moisture barrier,Offer toileting Q_hr    Activity Interventions: Pressure redistribution bed/mattress(bed type)    Mobility Interventions: Float heels,Turn and reposition approx.  every two hours(pillow and wedges)    Nutrition Interventions: Document food/fluid/supplement intake,Discuss nutritional consult with provider,Offer support with meals,snacks and hydration    Friction and Shear Interventions: Lift sheet,Trapeze to reposition,Transferring/repositioning devices                Problem: Impaired Skin Integrity/Pressure Injury Treatment  Goal: *Prevention of pressure injury  Description: Document Gaston Scale and appropriate interventions in the flowsheet. Outcome: Progressing Towards Goal  Note: Pressure Injury Interventions:  Sensory Interventions: Float heels,Keep linens dry and wrinkle-free,Maintain/enhance activity level,Minimize linen layers,Monitor skin under medical devices,Pad between skin to skin,Turn and reposition approx. every two hours (pillows and wedges if needed)    Moisture Interventions: Apply protective barrier, creams and emollients,Absorbent underpads,Check for incontinence Q2 hours and as needed,Internal/External urinary devices,Limit adult briefs,Maintain skin hydration (lotion/cream),Minimize layers,Moisture barrier,Offer toileting Q_hr    Activity Interventions: Pressure redistribution bed/mattress(bed type)    Mobility Interventions: Float heels,Turn and reposition approx.  every two hours(pillow and wedges)    Nutrition Interventions: Document food/fluid/supplement intake,Discuss nutritional consult with provider,Offer support with meals,snacks and hydration    Friction and Shear Interventions: Lift sheet,Trapeze to reposition,Transferring/repositioning devices                Problem: Patient Education: Go to Patient Education Activity  Goal: Patient/Family Education  Outcome: Progressing Towards Goal     Problem: Patient Education: Go to Patient Education Activity  Goal: Patient/Family Education  Outcome: Progressing Towards Goal

## 2022-04-23 LAB
ALBUMIN SERPL-MCNC: 2.5 G/DL (ref 3.5–5)
ANION GAP SERPL CALC-SCNC: 2 MMOL/L (ref 5–15)
BASOPHILS # BLD: 0 K/UL (ref 0–0.1)
BASOPHILS NFR BLD: 0 % (ref 0–1)
BUN SERPL-MCNC: 30 MG/DL (ref 6–20)
BUN/CREAT SERPL: 54 (ref 12–20)
CALCIUM SERPL-MCNC: 9.5 MG/DL (ref 8.5–10.1)
CHLORIDE SERPL-SCNC: 92 MMOL/L (ref 97–108)
CO2 SERPL-SCNC: 38 MMOL/L (ref 21–32)
CREAT SERPL-MCNC: 0.56 MG/DL (ref 0.55–1.02)
DIFFERENTIAL METHOD BLD: ABNORMAL
ECHO AO ROOT DIAM: 2.8 CM
ECHO AO ROOT INDEX: 1.22 CM/M2
ECHO AV CUSP MM: 1.7 CM
ECHO AV MEAN GRADIENT: 2 MMHG
ECHO AV MEAN VELOCITY: 0.6 M/S
ECHO AV PEAK GRADIENT: 3 MMHG
ECHO AV PEAK GRADIENT: 3 MMHG
ECHO AV PEAK VELOCITY: 0.8 M/S
ECHO AV PEAK VELOCITY: 0.8 M/S
ECHO AV VTI: 11.5 CM
ECHO LA DIAMETER INDEX: 1.39 CM/M2
ECHO LA DIAMETER: 3.2 CM
ECHO LA TO AORTIC ROOT RATIO: 1.14
ECHO LV E' LATERAL VELOCITY: 12 CM/S
ECHO LV E' SEPTAL VELOCITY: 8 CM/S
ECHO LV FRACTIONAL SHORTENING: 28 % (ref 28–44)
ECHO LV INTERNAL DIMENSION DIASTOLE INDEX: 1.39 CM/M2
ECHO LV INTERNAL DIMENSION DIASTOLIC MMODE: 4.4 CM (ref 3.9–5.3)
ECHO LV INTERNAL DIMENSION DIASTOLIC: 3.2 CM (ref 3.9–5.3)
ECHO LV INTERNAL DIMENSION SYSTOLIC INDEX: 1 CM/M2
ECHO LV INTERNAL DIMENSION SYSTOLIC MMODE: 2 CM
ECHO LV INTERNAL DIMENSION SYSTOLIC: 2.3 CM
ECHO LV IVSD MMODE: 1.5 CM (ref 0.6–0.9)
ECHO LV IVSD: 1.1 CM (ref 0.6–0.9)
ECHO LV IVSS MMODE: 1.9 CM
ECHO LV MASS 2D: 104.3 G (ref 67–162)
ECHO LV MASS INDEX 2D: 45.4 G/M2 (ref 43–95)
ECHO LV POSTERIOR WALL DIASTOLIC MMODE: 1.7 CM (ref 0.6–0.9)
ECHO LV POSTERIOR WALL DIASTOLIC: 1.1 CM (ref 0.6–0.9)
ECHO LV RELATIVE WALL THICKNESS RATIO: 0.69
ECHO LVOT AREA: 3.5 CM2
ECHO LVOT DIAM: 2.1 CM
ECHO MV A VELOCITY: 0.95 M/S
ECHO MV E DECELERATION TIME (DT): 119.7 MS
ECHO MV E VELOCITY: 0.86 M/S
ECHO MV E/A RATIO: 0.91
ECHO MV E/E' LATERAL: 7.17
ECHO MV E/E' RATIO (AVERAGED): 8.96
ECHO MV E/E' SEPTAL: 10.75
ECHO PV MAX VELOCITY: 1.2 M/S
ECHO PV PEAK GRADIENT: 6 MMHG
ECHO PV REGURGITANT END DIASTOLIC MAX VELOCITY: 1.2 M/S
ECHO RV FREE WALL PEAK S': 19 CM/S
ECHO RV TAPSE: 1.7 CM (ref 1.7–?)
ECHO RVOT PEAK GRADIENT: 4 MMHG
ECHO RVOT PEAK VELOCITY: 1 M/S
EOSINOPHIL # BLD: 0 K/UL (ref 0–0.4)
EOSINOPHIL NFR BLD: 0 % (ref 0–7)
ERYTHROCYTE [DISTWIDTH] IN BLOOD BY AUTOMATED COUNT: 15.4 % (ref 11.5–14.5)
GLUCOSE BLD STRIP.AUTO-MCNC: 151 MG/DL (ref 65–117)
GLUCOSE BLD STRIP.AUTO-MCNC: 158 MG/DL (ref 65–117)
GLUCOSE BLD STRIP.AUTO-MCNC: 199 MG/DL (ref 65–117)
GLUCOSE BLD STRIP.AUTO-MCNC: 210 MG/DL (ref 65–117)
GLUCOSE SERPL-MCNC: 161 MG/DL (ref 65–100)
HCT VFR BLD AUTO: 42.3 % (ref 35–47)
HGB BLD-MCNC: 12.4 G/DL (ref 11.5–16)
IMM GRANULOCYTES # BLD AUTO: 0.2 K/UL (ref 0–0.04)
IMM GRANULOCYTES NFR BLD AUTO: 2 % (ref 0–0.5)
LYMPHOCYTES # BLD: 0.8 K/UL (ref 0.8–3.5)
LYMPHOCYTES NFR BLD: 7 % (ref 12–49)
MAGNESIUM SERPL-MCNC: 2.4 MG/DL (ref 1.6–2.4)
MCH RBC QN AUTO: 30.3 PG (ref 26–34)
MCHC RBC AUTO-ENTMCNC: 29.3 G/DL (ref 30–36.5)
MCV RBC AUTO: 103.4 FL (ref 80–99)
MONOCYTES # BLD: 0.4 K/UL (ref 0–1)
MONOCYTES NFR BLD: 3 % (ref 5–13)
NEUTS SEG # BLD: 11.4 K/UL (ref 1.8–8)
NEUTS SEG NFR BLD: 88 % (ref 32–75)
NRBC # BLD: 0 K/UL (ref 0–0.01)
NRBC BLD-RTO: 0 PER 100 WBC
PHOSPHATE SERPL-MCNC: 3.6 MG/DL (ref 2.6–4.7)
PLATELET # BLD AUTO: 137 K/UL (ref 150–400)
PMV BLD AUTO: 12.4 FL (ref 8.9–12.9)
POTASSIUM SERPL-SCNC: 4.5 MMOL/L (ref 3.5–5.1)
RBC # BLD AUTO: 4.09 M/UL (ref 3.8–5.2)
SERVICE CMNT-IMP: ABNORMAL
SODIUM SERPL-SCNC: 132 MMOL/L (ref 136–145)
WBC # BLD AUTO: 12.9 K/UL (ref 3.6–11)

## 2022-04-23 PROCEDURE — 74011250636 HC RX REV CODE- 250/636: Performed by: INTERNAL MEDICINE

## 2022-04-23 PROCEDURE — 74011000250 HC RX REV CODE- 250: Performed by: HOSPITALIST

## 2022-04-23 PROCEDURE — 99232 SBSQ HOSP IP/OBS MODERATE 35: CPT | Performed by: INTERNAL MEDICINE

## 2022-04-23 PROCEDURE — 36415 COLL VENOUS BLD VENIPUNCTURE: CPT

## 2022-04-23 PROCEDURE — 2709999900 HC NON-CHARGEABLE SUPPLY

## 2022-04-23 PROCEDURE — 94660 CPAP INITIATION&MGMT: CPT

## 2022-04-23 PROCEDURE — 74011250636 HC RX REV CODE- 250/636: Performed by: HOSPITALIST

## 2022-04-23 PROCEDURE — 83735 ASSAY OF MAGNESIUM: CPT

## 2022-04-23 PROCEDURE — 77030040392 HC DRSG OPTIFOAM MDII -A

## 2022-04-23 PROCEDURE — 65270000029 HC RM PRIVATE

## 2022-04-23 PROCEDURE — 93306 TTE W/DOPPLER COMPLETE: CPT | Performed by: INTERNAL MEDICINE

## 2022-04-23 PROCEDURE — 74011250637 HC RX REV CODE- 250/637: Performed by: HOSPITALIST

## 2022-04-23 PROCEDURE — 85025 COMPLETE CBC W/AUTO DIFF WBC: CPT

## 2022-04-23 PROCEDURE — 74011000250 HC RX REV CODE- 250: Performed by: EMERGENCY MEDICINE

## 2022-04-23 PROCEDURE — 74011636637 HC RX REV CODE- 636/637: Performed by: HOSPITALIST

## 2022-04-23 PROCEDURE — 82962 GLUCOSE BLOOD TEST: CPT

## 2022-04-23 PROCEDURE — 94640 AIRWAY INHALATION TREATMENT: CPT

## 2022-04-23 PROCEDURE — 77030038269 HC DRN EXT URIN PURWCK BARD -A

## 2022-04-23 PROCEDURE — 80069 RENAL FUNCTION PANEL: CPT

## 2022-04-23 PROCEDURE — 74011000258 HC RX REV CODE- 258: Performed by: HOSPITALIST

## 2022-04-23 PROCEDURE — 87086 URINE CULTURE/COLONY COUNT: CPT

## 2022-04-23 PROCEDURE — 77010033678 HC OXYGEN DAILY

## 2022-04-23 RX ORDER — ENOXAPARIN SODIUM 100 MG/ML
30 INJECTION SUBCUTANEOUS EVERY 12 HOURS
Status: DISCONTINUED | OUTPATIENT
Start: 2022-04-23 | End: 2022-04-28 | Stop reason: HOSPADM

## 2022-04-23 RX ORDER — IPRATROPIUM BROMIDE AND ALBUTEROL SULFATE 2.5; .5 MG/3ML; MG/3ML
3 SOLUTION RESPIRATORY (INHALATION)
Status: DISCONTINUED | OUTPATIENT
Start: 2022-04-23 | End: 2022-04-28 | Stop reason: HOSPADM

## 2022-04-23 RX ADMIN — Medication 2 UNITS: at 16:58

## 2022-04-23 RX ADMIN — ENOXAPARIN SODIUM 40 MG: 40 INJECTION SUBCUTANEOUS at 08:10

## 2022-04-23 RX ADMIN — SODIUM CHLORIDE, PRESERVATIVE FREE 10 ML: 5 INJECTION INTRAVENOUS at 05:11

## 2022-04-23 RX ADMIN — CASTOR OIL AND BALSAM, PERU: 788; 87 OINTMENT TOPICAL at 20:48

## 2022-04-23 RX ADMIN — OXYCODONE AND ACETAMINOPHEN 2 TABLET: 5; 325 TABLET ORAL at 20:43

## 2022-04-23 RX ADMIN — ARFORMOTEROL TARTRATE: 15 SOLUTION RESPIRATORY (INHALATION) at 20:03

## 2022-04-23 RX ADMIN — Medication 3 UNITS: at 13:01

## 2022-04-23 RX ADMIN — DOXYCYCLINE 100 MG: 100 INJECTION, POWDER, LYOPHILIZED, FOR SOLUTION INTRAVENOUS at 08:09

## 2022-04-23 RX ADMIN — FUROSEMIDE 40 MG: 10 INJECTION, SOLUTION INTRAMUSCULAR; INTRAVENOUS at 17:01

## 2022-04-23 RX ADMIN — METHYLPREDNISOLONE SODIUM SUCCINATE 40 MG: 40 INJECTION, POWDER, FOR SOLUTION INTRAMUSCULAR; INTRAVENOUS at 13:02

## 2022-04-23 RX ADMIN — Medication: at 08:19

## 2022-04-23 RX ADMIN — ATORVASTATIN CALCIUM 10 MG: 10 TABLET, FILM COATED ORAL at 20:47

## 2022-04-23 RX ADMIN — METOPROLOL TARTRATE 25 MG: 25 TABLET, FILM COATED ORAL at 17:01

## 2022-04-23 RX ADMIN — DOXYCYCLINE 100 MG: 100 INJECTION, POWDER, LYOPHILIZED, FOR SOLUTION INTRAVENOUS at 20:46

## 2022-04-23 RX ADMIN — Medication: at 17:02

## 2022-04-23 RX ADMIN — MICONAZOLE NITRATE: 20 CREAM TOPICAL at 20:48

## 2022-04-23 RX ADMIN — ARFORMOTEROL TARTRATE: 15 SOLUTION RESPIRATORY (INHALATION) at 07:21

## 2022-04-23 RX ADMIN — METHYLPREDNISOLONE SODIUM SUCCINATE 40 MG: 40 INJECTION, POWDER, FOR SOLUTION INTRAMUSCULAR; INTRAVENOUS at 05:11

## 2022-04-23 RX ADMIN — SODIUM CHLORIDE, PRESERVATIVE FREE 10 ML: 5 INJECTION INTRAVENOUS at 13:01

## 2022-04-23 RX ADMIN — Medication 2 UNITS: at 08:10

## 2022-04-23 RX ADMIN — MICONAZOLE NITRATE: 20 CREAM TOPICAL at 08:18

## 2022-04-23 RX ADMIN — FUROSEMIDE 40 MG: 10 INJECTION, SOLUTION INTRAMUSCULAR; INTRAVENOUS at 08:10

## 2022-04-23 RX ADMIN — SODIUM CHLORIDE, PRESERVATIVE FREE 10 ML: 5 INJECTION INTRAVENOUS at 20:49

## 2022-04-23 RX ADMIN — OXYCODONE AND ACETAMINOPHEN 2 TABLET: 5; 325 TABLET ORAL at 14:10

## 2022-04-23 RX ADMIN — METOPROLOL TARTRATE 25 MG: 25 TABLET, FILM COATED ORAL at 08:10

## 2022-04-23 RX ADMIN — ACETAMINOPHEN 325MG 650 MG: 325 TABLET ORAL at 16:59

## 2022-04-23 RX ADMIN — OXYCODONE AND ACETAMINOPHEN 2 TABLET: 5; 325 TABLET ORAL at 08:10

## 2022-04-23 RX ADMIN — CASTOR OIL AND BALSAM, PERU: 788; 87 OINTMENT TOPICAL at 08:19

## 2022-04-23 RX ADMIN — ASPIRIN 81 MG: 81 TABLET, CHEWABLE ORAL at 08:10

## 2022-04-23 RX ADMIN — ENOXAPARIN SODIUM 30 MG: 100 INJECTION SUBCUTANEOUS at 20:45

## 2022-04-23 RX ADMIN — METHYLPREDNISOLONE SODIUM SUCCINATE 40 MG: 40 INJECTION, POWDER, FOR SOLUTION INTRAMUSCULAR; INTRAVENOUS at 20:49

## 2022-04-23 RX ADMIN — ONDANSETRON 4 MG: 4 TABLET, ORALLY DISINTEGRATING ORAL at 20:45

## 2022-04-23 NOTE — PROGRESS NOTES
Problem: Falls - Risk of  Goal: *Absence of Falls  Description: Document Sterling Harden Fall Risk and appropriate interventions in the flowsheet. Outcome: Progressing Towards Goal  Note: Fall Risk Interventions:  Mobility Interventions: Bed/chair exit alarm,Utilize gait belt for transfers/ambulation         Medication Interventions: Bed/chair exit alarm,Utilize gait belt for transfers/ambulation    Elimination Interventions: Call light in reach,Bed/chair exit alarm,Toileting schedule/hourly rounds              Problem: Patient Education: Go to Patient Education Activity  Goal: Patient/Family Education  Outcome: Progressing Towards Goal     Problem: Diabetes Self-Management  Goal: *Incorporating nutritional management into lifestyle  Description: Describe effect of type, amount and timing of food on blood glucose; list 3 methods for planning meals. Outcome: Progressing Towards Goal     Problem: Pressure Injury - Risk of  Goal: *Prevention of pressure injury  Description: Document Gaston Scale and appropriate interventions in the flowsheet. Outcome: Progressing Towards Goal  Note: Pressure Injury Interventions:  Sensory Interventions: Discuss PT/OT consult with provider,Float heels,Keep linens dry and wrinkle-free,Maintain/enhance activity level,Minimize linen layers,Monitor skin under medical devices,Pad between skin to skin,Pressure redistribution bed/mattress (bed type),Turn and reposition approx. every two hours (pillows and wedges if needed)    Moisture Interventions: Apply protective barrier, creams and emollients,Absorbent underpads,Check for incontinence Q2 hours and as needed,Internal/External urinary devices,Limit adult briefs,Maintain skin hydration (lotion/cream),Minimize layers,Moisture barrier,Offer toileting Q_hr    Activity Interventions: Pressure redistribution bed/mattress(bed type)    Mobility Interventions: Float heels,Turn and reposition approx.  every two hours(pillow and wedges)    Nutrition Interventions: Document food/fluid/supplement intake,Discuss nutritional consult with provider    Friction and Shear Interventions: Feet elevated on foot rest,Apply protective barrier, creams and emollients,Lift team/patient mobility team,Transferring/repositioning devices                Problem: Impaired Skin Integrity/Pressure Injury Treatment  Goal: *Improvement of Existing Pressure Injury  Outcome: Progressing Towards Goal     Problem: Falls - Risk of  Goal: *Absence of Falls  Description: Document Anna Fall Risk and appropriate interventions in the flowsheet.   Outcome: Progressing Towards Goal  Note: Fall Risk Interventions:  Mobility Interventions: Bed/chair exit alarm,Utilize gait belt for transfers/ambulation         Medication Interventions: Bed/chair exit alarm,Utilize gait belt for transfers/ambulation    Elimination Interventions: Call light in reach,Bed/chair exit alarm,Toileting schedule/hourly rounds

## 2022-04-23 NOTE — PROGRESS NOTES
4/23/2022 9:28 AM    Admit Date: 4/21/2022    Admit Diagnosis: Hypoxia [R09.02];CHF exacerbation (HCC) [I50.9];COPD exacerbation ([address] [J44.1]; Physical deconditioning [R53.81]    Subjective:     No events.     Visit Vitals  BP (!) 175/82 (BP 1 Location: Right upper arm, BP Patient Position: At rest)   Pulse 98   Temp 97.8 °F (36.6 °C)   Resp 24   Ht 5' 3\" (1.6 m)   Wt 294 lb (133.4 kg)   SpO2 98%   BMI 52.08 kg/m²     Current Facility-Administered Medications   Medication Dose Route Frequency    albuterol-ipratropium (DUO-NEB) 2.5 MG-0.5 MG/3 ML  3 mL Nebulization Q4H PRN    oxyCODONE-acetaminophen (PERCOCET) 5-325 mg per tablet 2 Tablet  2 Tablet Oral Q6H PRN    sodium chloride (NS) flush 5-40 mL  5-40 mL IntraVENous Q8H    sodium chloride (NS) flush 5-40 mL  5-40 mL IntraVENous PRN    aspirin chewable tablet 81 mg  81 mg Oral DAILY    atorvastatin (LIPITOR) tablet 10 mg  10 mg Oral QHS    arformoterol 15 mcg/budesonide 0.25 mg neb solution   Nebulization BID RT    metoprolol tartrate (LOPRESSOR) tablet 25 mg  25 mg Oral BID    methylPREDNISolone (PF) (SOLU-MEDROL) injection 40 mg  40 mg IntraVENous Q8H    furosemide (LASIX) injection 40 mg  40 mg IntraVENous BID    albuterol (PROVENTIL VENTOLIN) nebulizer solution 2.5 mg  2.5 mg Inhalation Q4H PRN    doxycycline (VIBRAMYCIN) 100 mg in 0.9% sodium chloride (MBP/ADV) 100 mL MBP  100 mg IntraVENous Q12H    acetaminophen (TYLENOL) tablet 650 mg  650 mg Oral Q6H PRN    Or    acetaminophen (TYLENOL) suppository 650 mg  650 mg Rectal Q6H PRN    polyethylene glycol (MIRALAX) packet 17 g  17 g Oral DAILY PRN    ondansetron (ZOFRAN ODT) tablet 4 mg  4 mg Oral Q8H PRN    Or    ondansetron (ZOFRAN) injection 4 mg  4 mg IntraVENous Q6H PRN    enoxaparin (LOVENOX) injection 40 mg  40 mg SubCUTAneous DAILY    insulin lispro (HUMALOG) injection   SubCUTAneous AC&HS    glucose chewable tablet 16 g  4 Tablet Oral PRN    glucagon (GLUCAGEN) injection 1 mg  1 mg IntraMUSCular PRN    dextrose 10% infusion 0-250 mL  0-250 mL IntraVENous PRN    hydrALAZINE (APRESOLINE) 20 mg/mL injection 20 mg  20 mg IntraVENous Q6H PRN    ammonium lactate (LAC-HYDRIN) 12 % lotion   Topical BID    miconazole (SECURA) 2 % extra thick cream   Topical BID    balsam peru-castor oiL (VENELEX) ointment   Topical BID         Objective:      Visit Vitals  BP (!) 175/82 (BP 1 Location: Right upper arm, BP Patient Position: At rest)   Pulse 98   Temp 97.8 °F (36.6 °C)   Resp 24   Ht 5' 3\" (1.6 m)   Wt 294 lb (133.4 kg)   SpO2 98%   BMI 52.08 kg/m²       Physical Exam:  Resting comfortably. No resp distress. Abdomen: soft, non-tender.  Bowel sounds normal.   Heart: regular rate and rhythm, S1, S2 normal, no murmur, click, rub or gallop  Lungs: clear to auscultation bilaterally  Neurologic: Grossly normal    Data Review:   Labs:    Recent Results (from the past 24 hour(s))   GLUCOSE, POC    Collection Time: 04/22/22 10:41 AM   Result Value Ref Range    Glucose (POC) 221 (H) 65 - 117 mg/dL    Performed by Rosenda NERI    ECHO ADULT COMPLETE    Collection Time: 04/22/22 11:28 AM   Result Value Ref Range    IVSd 1.1 (A) 0.6 - 0.9 cm    LVIDd 3.2 (A) 3.9 - 5.3 cm    LVIDs 2.3 cm    LVOT Diameter 2.1 cm    LVPWd 1.1 (A) 0.6 - 0.9 cm    IVSd M-mode 1.5 (A) 0.6 - 0.9 cm    IVSs M-mode 1.9 cm    LVIDd M-mode 4.4 3.9 - 5.3 cm    LVIDs M-mode 2.0 cm    LVPWd M-mode 1.7 (A) 0.6 - 0.9 cm    RV Free Wall Peak S' 19 cm/s    RVOT Peak Gradient 4 mmHg    RVOT Peak Velocity 1.0 m/s    LA Diameter 3.2 cm    AV Cusp Mmode 1.7 cm    AV Peak Gradient 3 mmHg    AV Peak Gradient 3 mmHg    AV Mean Gradient 2 mmHg    AV Peak Velocity 0.8 m/s    AV Peak Velocity 0.8 m/s    AV Mean Velocity 0.6 m/s    AV VTI 11.5 cm    MV A Velocity 0.95 m/s    MV E Wave Deceleration Time 119.7 ms    MV E Velocity 0.86 m/s    LV E' Lateral Velocity 12 cm/s    LV E' Septal Velocity 8 cm/s    PV Peak Gradient 6 mmHg    PV Max Velocity 1.2 m/s    TAPSE 1.7 1.7 cm    Aortic Root 2.8 cm    Fractional Shortening 2D 28 28 - 44 %    LVIDd Index 1.39 cm/m2    LVIDs Index 1.00 cm/m2    LV RWT Ratio 0.69     LV Mass 2D 104.3 67 - 162 g    LV Mass 2D Index 45.4 43 - 95 g/m2    MV E/A 0.91     E/E' Ratio (Averaged) 8.96     E/E' Lateral 7.17     E/E' Septal 10.75     LVOT Area 3.5 cm2    LA Size Index 1.39 cm/m2    LA/AO Root Ratio 1.14     Ao Root Index 1.22 cm/m2    AZ End Diastolic Max Velocity 1.2 m/s   GLUCOSE, POC    Collection Time: 04/22/22  5:17 PM   Result Value Ref Range    Glucose (POC) 176 (H) 65 - 117 mg/dL    Performed by Ana Escobar    GLUCOSE, POC    Collection Time: 04/22/22  8:51 PM   Result Value Ref Range    Glucose (POC) 258 (H) 65 - 117 mg/dL    Performed by Liss Larose (PCT)    CBC WITH AUTOMATED DIFF    Collection Time: 04/23/22  2:55 AM   Result Value Ref Range    WBC 12.9 (H) 3.6 - 11.0 K/uL    RBC 4.09 3.80 - 5.20 M/uL    HGB 12.4 11.5 - 16.0 g/dL    HCT 42.3 35.0 - 47.0 %    .4 (H) 80.0 - 99.0 FL    MCH 30.3 26.0 - 34.0 PG    MCHC 29.3 (L) 30.0 - 36.5 g/dL    RDW 15.4 (H) 11.5 - 14.5 %    PLATELET 365 (L) 777 - 400 K/uL    MPV 12.4 8.9 - 12.9 FL    NRBC 0.0 0  WBC    ABSOLUTE NRBC 0.00 0.00 - 0.01 K/uL    NEUTROPHILS 88 (H) 32 - 75 %    LYMPHOCYTES 7 (L) 12 - 49 %    MONOCYTES 3 (L) 5 - 13 %    EOSINOPHILS 0 0 - 7 %    BASOPHILS 0 0 - 1 %    IMMATURE GRANULOCYTES 2 (H) 0.0 - 0.5 %    ABS. NEUTROPHILS 11.4 (H) 1.8 - 8.0 K/UL    ABS. LYMPHOCYTES 0.8 0.8 - 3.5 K/UL    ABS. MONOCYTES 0.4 0.0 - 1.0 K/UL    ABS. EOSINOPHILS 0.0 0.0 - 0.4 K/UL    ABS. BASOPHILS 0.0 0.0 - 0.1 K/UL    ABS. IMM.  GRANS. 0.2 (H) 0.00 - 0.04 K/UL    DF AUTOMATED     RENAL FUNCTION PANEL    Collection Time: 04/23/22  2:55 AM   Result Value Ref Range    Sodium 132 (L) 136 - 145 mmol/L    Potassium 4.5 3.5 - 5.1 mmol/L    Chloride 92 (L) 97 - 108 mmol/L    CO2 38 (H) 21 - 32 mmol/L    Anion gap 2 (L) 5 - 15 mmol/L    Glucose 161 (H) 65 - 100 mg/dL    BUN 30 (H) 6 - 20 MG/DL    Creatinine 0.56 0.55 - 1.02 MG/DL    BUN/Creatinine ratio 54 (H) 12 - 20      GFR est AA >60 >60 ml/min/1.73m2    GFR est non-AA >60 >60 ml/min/1.73m2    Calcium 9.5 8.5 - 10.1 MG/DL    Phosphorus 3.6 2.6 - 4.7 MG/DL    Albumin 2.5 (L) 3.5 - 5.0 g/dL   MAGNESIUM    Collection Time: 04/23/22  2:55 AM   Result Value Ref Range    Magnesium 2.4 1.6 - 2.4 mg/dL   GLUCOSE, POC    Collection Time: 04/23/22  7:09 AM   Result Value Ref Range    Glucose (POC) 158 (H) 65 - 117 mg/dL    Performed by Nikita Rushing        Telemetry: SR      Assessment:     Active Problems:    Physical deconditioning (12/4/2020)      Hypoxia (4/21/2022)      COPD exacerbation ([facility] (4/21/2022)      CHF exacerbation ([facility] (4/21/2022)        Plan: Morbid obesity, DHF:  Echo noted. Continue diuresis. No further cardiac work up. Remain in SR. Continue current antihypertensive.

## 2022-04-23 NOTE — PROGRESS NOTES
1900  Bedside shift change report given to Eleni Ku RN (oncoming nurse) by Priya Chandler RN (offgoing nurse). Report included the following information SBAR.     0700  End of Shift Note    Bedside shift change report given to MARIE Rankin (oncoming nurse) by Palak Johnson RN (offgoing nurse). Report included the following information SBAR    Shift worked:  7pm to [employer] summary and any significant changes:          Concerns for physician to address:       Zone phone for oncoming shift:          Activity:  Activity Level: Bed Rest  Number times ambulated in hallways past shift: 0  Number of times OOB to chair past shift: 0    Cardiac:   Cardiac Monitoring: Yes      Cardiac Rhythm: Sinus Rhythm    Access:   Current line(s): PIV     Genitourinary:   Urinary status: voiding and external catheter    Respiratory:   O2 Device: BIPAP  Chronic home O2 use?: YES  Incentive spirometer at bedside: NO       GI:  Last Bowel Movement Date: 04/21/22  Current diet:  ADULT DIET Regular; 3 carb choices (45 gm/meal)  Passing flatus: YES  Tolerating current diet: YES       Pain Management:   Patient states pain is manageable on current regimen: YES    Skin:  Gaston Score: 12  Interventions: turn team, speciality bed, float heels, foam dressing, limit briefs, internal/external urinary devices and nutritional support     Patient Safety:  Fall Score:  Total Score: 2  Interventions: bed/chair alarm  High Fall Risk: Yes    Length of Stay:  Expected LOS: 3d 14h  Actual LOS: 2      Palak Johnson RN

## 2022-04-23 NOTE — PROGRESS NOTES
Problem: Falls - Risk of  Goal: *Absence of Falls  Description: Document Fran Mandmau Fall Risk and appropriate interventions in the flowsheet. Outcome: Progressing Towards Goal  Note: Fall Risk Interventions:  Mobility Interventions: Bed/chair exit alarm,PT Consult for mobility concerns,OT consult for ADLs,Communicate number of staff needed for ambulation/transfer         Medication Interventions: Bed/chair exit alarm,Patient to call before getting OOB    Elimination Interventions: Call light in reach,Patient to call for help with toileting needs              Problem: Activity Intolerance  Goal: *Oxygen saturation during activity within specified parameters  Outcome: Progressing Towards Goal     Problem: Pressure Injury - Risk of  Goal: *Prevention of pressure injury  Description: Document Gaston Scale and appropriate interventions in the flowsheet. Outcome: Progressing Towards Goal  Note: Pressure Injury Interventions:  Sensory Interventions: Float heels,Keep linens dry and wrinkle-free,Minimize linen layers,Monitor skin under medical devices,Pressure redistribution bed/mattress (bed type),Turn and reposition approx. every two hours (pillows and wedges if needed),Assess changes in LOC,Assess need for specialty bed    Moisture Interventions: Apply protective barrier, creams and emollients,Absorbent underpads,Contain wound drainage,Internal/External urinary devices,Limit adult briefs,Maintain skin hydration (lotion/cream)    Activity Interventions: Pressure redistribution bed/mattress(bed type),PT/OT evaluation    Mobility Interventions: Float heels,HOB 30 degrees or less,Pressure redistribution bed/mattress (bed type),PT/OT evaluation,Assess need for specialty bed,Turn and reposition approx.  every two hours(pillow and wedges)    Nutrition Interventions: Document food/fluid/supplement intake    Friction and Shear Interventions: Lift sheet,Lift team/patient mobility team,Apply protective barrier, creams and emollients,Foam dressings/transparent film/skin sealants,HOB 30 degrees or less,Minimize layers

## 2022-04-23 NOTE — PROGRESS NOTES
ADULT PROTOCOL: JET AEROSOL ASSESSMENT    Patient  Nash Burnette     57 y.o.   female     4/23/2022  11:34 AM    Breath Sounds Pre Procedure: Right Breath Sounds: Expiratory wheezing                               Left Breath Sounds: Expiratory wheezing    Breath Sounds Post Procedure:                                        Breathing pattern: Pre procedure Breathing Pattern: Dyspnea at rest          Post procedure Breathing Pattern: Dyspnea at rest    Heart Rate: Pre procedure Pulse: 84           Post procedure Pulse: 96    Resp Rate: Pre procedure Respirations: 25           Post procedure Respirations: 24      Cough: Pre procedure Cough: Non-productive               Post procedure Cough: Non-productive      Oxygen: 6L/NC     Changed: NO    SpO2: Pre procedure SpO2: 98 %                Post procedure SpO2: 96 %      Nebulizer Therapy: Current medications Aerosolized Medications: Brovana,Pulmicort      Changed:NO        Problem List:   Patient Active Problem List   Diagnosis Code    COVID-19 ruled out Z20.822    S/P cardiac cath Z98.890    Mixed hyperlipidemia E78.2    Morbid obesity ([facility] E66.01    Acute respiratory failure ([facility] J96.00    Atrial flutter with rapid ventricular response (HCC) I48.92    JOSLYN (obstructive sleep apnea) G47.33    Acute on chronic respiratory failure with hypoxia and hypercapnia (HCC) J96.21, J96.22    SOB (shortness of breath) R06.02    Physical deconditioning R53.81    Counseling regarding advance care planning and goals of care Z71.89    Chronic pain G89.29    Dependence on supplemental oxygen Z99.81    Essential hypertension I10    Heart failure (HCC) I50.9    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

## 2022-04-23 NOTE — PROGRESS NOTES
End of Shift Note    Bedside shift change report given to RN (oncoming nurse) by Rod Altman (offgoing nurse). Report included the following information SBAR, Kardex, Procedure Summary, Intake/Output, MAR and Recent Results    Shift worked:  7-7pm     Shift summary and any significant changes:     No significant changes. Currently on 5 L O2.  Pain meds and wound care completed per order     Concerns for physician to address:       Zone phone for oncoming shift:              Rod Altman

## 2022-04-23 NOTE — PROGRESS NOTES
Transition of Care Plan:     RUR: 14%  Disposition: return home with daughter and Hermann Dewitt (declines placement)   Follow up appointments: PCP, specialists as indicated   DME needed: needs mattress for hospital bed   Transportation at [address] [place] [address] or means to access home:  yes      IM Medicare Letter: to be reviewed prior to d/c   Is patient a BCPI-A Bundle: No                    If yes, was Bundle Letter given?:    Is patient a  and connected with the [facility]?  No           If yes, was [facility] transfer form completed and [facility] notified? Caregiver Contact: Edda Resendiz (daughter) [phone]  Discharge Caregiver contacted prior to [address] needed?: no    11:35 a.m. [address] referral is pending. CM checked on referral sent to [place] about the mattress. They declined and referral pt to [address] CM called [employer] and new mattresses are $199.00. They do sell new covers for $10.00. CM asked about payment plans, which they do not offer. CM was advised they have used mattresses that are free if pt is interested.       Kendell Machuca MSW  Care Management, [facility]

## 2022-04-24 LAB
ANION GAP SERPL CALC-SCNC: 1 MMOL/L (ref 5–15)
BACTERIA SPEC CULT: NORMAL
BUN SERPL-MCNC: 38 MG/DL (ref 6–20)
BUN/CREAT SERPL: 62 (ref 12–20)
CALCIUM SERPL-MCNC: 9.2 MG/DL (ref 8.5–10.1)
CC UR VC: NORMAL
CHLORIDE SERPL-SCNC: 92 MMOL/L (ref 97–108)
CO2 SERPL-SCNC: 43 MMOL/L (ref 21–32)
CREAT SERPL-MCNC: 0.61 MG/DL (ref 0.55–1.02)
GLUCOSE BLD STRIP.AUTO-MCNC: 149 MG/DL (ref 65–117)
GLUCOSE BLD STRIP.AUTO-MCNC: 160 MG/DL (ref 65–117)
GLUCOSE BLD STRIP.AUTO-MCNC: 190 MG/DL (ref 65–117)
GLUCOSE BLD STRIP.AUTO-MCNC: 243 MG/DL (ref 65–117)
GLUCOSE SERPL-MCNC: 169 MG/DL (ref 65–100)
POTASSIUM SERPL-SCNC: 4.1 MMOL/L (ref 3.5–5.1)
SERVICE CMNT-IMP: ABNORMAL
SERVICE CMNT-IMP: NORMAL
SODIUM SERPL-SCNC: 136 MMOL/L (ref 136–145)

## 2022-04-24 PROCEDURE — 74011636637 HC RX REV CODE- 636/637: Performed by: HOSPITALIST

## 2022-04-24 PROCEDURE — 74011000258 HC RX REV CODE- 258: Performed by: HOSPITALIST

## 2022-04-24 PROCEDURE — 2709999900 HC NON-CHARGEABLE SUPPLY

## 2022-04-24 PROCEDURE — 36415 COLL VENOUS BLD VENIPUNCTURE: CPT

## 2022-04-24 PROCEDURE — 77030029684 HC NEB SM VOL KT MONA -A

## 2022-04-24 PROCEDURE — 77010033678 HC OXYGEN DAILY

## 2022-04-24 PROCEDURE — 82962 GLUCOSE BLOOD TEST: CPT

## 2022-04-24 PROCEDURE — 74011250636 HC RX REV CODE- 250/636: Performed by: HOSPITALIST

## 2022-04-24 PROCEDURE — 74011000250 HC RX REV CODE- 250: Performed by: EMERGENCY MEDICINE

## 2022-04-24 PROCEDURE — 80048 BASIC METABOLIC PNL TOTAL CA: CPT

## 2022-04-24 PROCEDURE — 94660 CPAP INITIATION&MGMT: CPT

## 2022-04-24 PROCEDURE — 74011000250 HC RX REV CODE- 250: Performed by: HOSPITALIST

## 2022-04-24 PROCEDURE — 74011250636 HC RX REV CODE- 250/636: Performed by: INTERNAL MEDICINE

## 2022-04-24 PROCEDURE — 74011250637 HC RX REV CODE- 250/637: Performed by: HOSPITALIST

## 2022-04-24 PROCEDURE — 65270000029 HC RM PRIVATE

## 2022-04-24 PROCEDURE — 94762 N-INVAS EAR/PLS OXIMTRY CONT: CPT

## 2022-04-24 PROCEDURE — 94640 AIRWAY INHALATION TREATMENT: CPT

## 2022-04-24 PROCEDURE — 74011250637 HC RX REV CODE- 250/637: Performed by: INTERNAL MEDICINE

## 2022-04-24 RX ORDER — FUROSEMIDE 40 MG/1
40 TABLET ORAL DAILY
Status: DISCONTINUED | OUTPATIENT
Start: 2022-04-24 | End: 2022-04-28 | Stop reason: HOSPADM

## 2022-04-24 RX ORDER — ACETAZOLAMIDE 250 MG/1
250 TABLET ORAL 2 TIMES DAILY
Status: DISPENSED | OUTPATIENT
Start: 2022-04-24 | End: 2022-04-25

## 2022-04-24 RX ADMIN — MICONAZOLE NITRATE: 20 CREAM TOPICAL at 21:47

## 2022-04-24 RX ADMIN — METHYLPREDNISOLONE SODIUM SUCCINATE 40 MG: 40 INJECTION, POWDER, FOR SOLUTION INTRAMUSCULAR; INTRAVENOUS at 06:35

## 2022-04-24 RX ADMIN — METHYLPREDNISOLONE SODIUM SUCCINATE 40 MG: 40 INJECTION, POWDER, FOR SOLUTION INTRAMUSCULAR; INTRAVENOUS at 14:21

## 2022-04-24 RX ADMIN — Medication 3 UNITS: at 12:04

## 2022-04-24 RX ADMIN — CASTOR OIL AND BALSAM, PERU: 788; 87 OINTMENT TOPICAL at 09:07

## 2022-04-24 RX ADMIN — ENOXAPARIN SODIUM 30 MG: 100 INJECTION SUBCUTANEOUS at 21:45

## 2022-04-24 RX ADMIN — ATORVASTATIN CALCIUM 10 MG: 10 TABLET, FILM COATED ORAL at 21:45

## 2022-04-24 RX ADMIN — Medication: at 09:07

## 2022-04-24 RX ADMIN — ASPIRIN 81 MG: 81 TABLET, CHEWABLE ORAL at 09:02

## 2022-04-24 RX ADMIN — ACETAMINOPHEN 325MG 650 MG: 325 TABLET ORAL at 14:19

## 2022-04-24 RX ADMIN — ARFORMOTEROL TARTRATE: 15 SOLUTION RESPIRATORY (INHALATION) at 21:51

## 2022-04-24 RX ADMIN — ARFORMOTEROL TARTRATE: 15 SOLUTION RESPIRATORY (INHALATION) at 08:24

## 2022-04-24 RX ADMIN — METHYLPREDNISOLONE SODIUM SUCCINATE 40 MG: 40 INJECTION, POWDER, FOR SOLUTION INTRAMUSCULAR; INTRAVENOUS at 21:45

## 2022-04-24 RX ADMIN — SODIUM CHLORIDE, PRESERVATIVE FREE 10 ML: 5 INJECTION INTRAVENOUS at 21:46

## 2022-04-24 RX ADMIN — ENOXAPARIN SODIUM 30 MG: 100 INJECTION SUBCUTANEOUS at 09:03

## 2022-04-24 RX ADMIN — MICONAZOLE NITRATE: 20 CREAM TOPICAL at 09:07

## 2022-04-24 RX ADMIN — FUROSEMIDE 40 MG: 10 INJECTION, SOLUTION INTRAMUSCULAR; INTRAVENOUS at 09:02

## 2022-04-24 RX ADMIN — CASTOR OIL AND BALSAM, PERU: 788; 87 OINTMENT TOPICAL at 21:47

## 2022-04-24 RX ADMIN — FUROSEMIDE 40 MG: 40 TABLET ORAL at 17:39

## 2022-04-24 RX ADMIN — Medication 2 UNITS: at 09:02

## 2022-04-24 RX ADMIN — Medication 2 UNITS: at 17:39

## 2022-04-24 RX ADMIN — SODIUM CHLORIDE, PRESERVATIVE FREE 10 ML: 5 INJECTION INTRAVENOUS at 06:35

## 2022-04-24 RX ADMIN — Medication: at 18:46

## 2022-04-24 RX ADMIN — ACETAZOLAMIDE 250 MG: 250 TABLET ORAL at 17:39

## 2022-04-24 RX ADMIN — SODIUM CHLORIDE, PRESERVATIVE FREE 10 ML: 5 INJECTION INTRAVENOUS at 14:21

## 2022-04-24 RX ADMIN — OXYCODONE AND ACETAMINOPHEN 2 TABLET: 5; 325 TABLET ORAL at 09:02

## 2022-04-24 RX ADMIN — METOPROLOL TARTRATE 25 MG: 25 TABLET, FILM COATED ORAL at 17:39

## 2022-04-24 RX ADMIN — DOXYCYCLINE 100 MG: 100 INJECTION, POWDER, LYOPHILIZED, FOR SOLUTION INTRAVENOUS at 21:45

## 2022-04-24 RX ADMIN — METOPROLOL TARTRATE 25 MG: 25 TABLET, FILM COATED ORAL at 09:03

## 2022-04-24 RX ADMIN — DOXYCYCLINE 100 MG: 100 INJECTION, POWDER, LYOPHILIZED, FOR SOLUTION INTRAVENOUS at 09:02

## 2022-04-24 NOTE — PROGRESS NOTES
Hospitalist Progress Note    NAME: Sakshi Willson   :  1960   MRN:  [id number]     Subjective:   Daily Progress Note: 2022 7:53 AM      Chief complaint: Shortness of breath  Shortness of breath is slightly better. He is on 6 L nasal cannula.     Current Facility-Administered Medications   Medication Dose Route Frequency    albuterol-ipratropium (DUO-NEB) 2.5 MG-0.5 MG/3 ML  3 mL Nebulization Q4H PRN    enoxaparin (LOVENOX) injection 30 mg  30 mg SubCUTAneous Q12H    oxyCODONE-acetaminophen (PERCOCET) 5-325 mg per tablet 2 Tablet  2 Tablet Oral Q6H PRN    sodium chloride (NS) flush 5-40 mL  5-40 mL IntraVENous Q8H    sodium chloride (NS) flush 5-40 mL  5-40 mL IntraVENous PRN    aspirin chewable tablet 81 mg  81 mg Oral DAILY    atorvastatin (LIPITOR) tablet 10 mg  10 mg Oral QHS    arformoterol 15 mcg/budesonide 0.25 mg neb solution   Nebulization BID RT    metoprolol tartrate (LOPRESSOR) tablet 25 mg  25 mg Oral BID    methylPREDNISolone (PF) (SOLU-MEDROL) injection 40 mg  40 mg IntraVENous Q8H    furosemide (LASIX) injection 40 mg  40 mg IntraVENous BID    albuterol (PROVENTIL VENTOLIN) nebulizer solution 2.5 mg  2.5 mg Inhalation Q4H PRN    doxycycline (VIBRAMYCIN) 100 mg in 0.9% sodium chloride (MBP/ADV) 100 mL MBP  100 mg IntraVENous Q12H    acetaminophen (TYLENOL) tablet 650 mg  650 mg Oral Q6H PRN    Or    acetaminophen (TYLENOL) suppository 650 mg  650 mg Rectal Q6H PRN    polyethylene glycol (MIRALAX) packet 17 g  17 g Oral DAILY PRN    ondansetron (ZOFRAN ODT) tablet 4 mg  4 mg Oral Q8H PRN    Or    ondansetron (ZOFRAN) injection 4 mg  4 mg IntraVENous Q6H PRN    insulin lispro (HUMALOG) injection   SubCUTAneous AC&HS    glucose chewable tablet 16 g  4 Tablet Oral PRN    glucagon (GLUCAGEN) injection 1 mg  1 mg IntraMUSCular PRN    dextrose 10% infusion 0-250 mL  0-250 mL IntraVENous PRN    hydrALAZINE (APRESOLINE) 20 mg/mL injection 20 mg  20 mg IntraVENous Q6H PRN    ammonium lactate (LAC-HYDRIN) 12 % lotion   Topical BID    miconazole (SECURA) 2 % extra thick cream   Topical BID    balsam peru-castor oiL (VENELEX) ointment   Topical BID          Objective:     Visit Vitals  BP (!) 144/67 (BP 1 Location: Right upper arm, BP Patient Position: At rest)   Pulse 100   Temp 98.3 °F (36.8 °C)   Resp 20   Ht 5' 3\" (1.6 m)   Wt 133.4 kg (294 lb)   SpO2 94%   BMI 52.08 kg/m²    O2 Flow Rate (L/min): 5 l/min O2 Device: Nasal cannula    Temp (24hrs), Av °F (36.7 °C), Min:97.7 °F (36.5 °C), Max:98.4 °F (36.9 °C)        PHYSICAL EXAM:  General chronically ill looking obese  Neck Short  CVS tachycardic  Respiratory symmetric expansion  Abdomen morbidly obese, soft  Extremities 3+ edema  Neuro alert, normal speech  Psych flat affect  Skin rash in groin area        Data Review    Recent Results (from the past 24 hour(s))   CBC WITH AUTOMATED DIFF    Collection Time: 22  2:55 AM   Result Value Ref Range    WBC 12.9 (H) 3.6 - 11.0 K/uL    RBC 4.09 3.80 - 5.20 M/uL    HGB 12.4 11.5 - 16.0 g/dL    HCT 42.3 35.0 - 47.0 %    .4 (H) 80.0 - 99.0 FL    MCH 30.3 26.0 - 34.0 PG    MCHC 29.3 (L) 30.0 - 36.5 g/dL    RDW 15.4 (H) 11.5 - 14.5 %    PLATELET 431 (L) 928 - 400 K/uL    MPV 12.4 8.9 - 12.9 FL    NRBC 0.0 0  WBC    ABSOLUTE NRBC 0.00 0.00 - 0.01 K/uL    NEUTROPHILS 88 (H) 32 - 75 %    LYMPHOCYTES 7 (L) 12 - 49 %    MONOCYTES 3 (L) 5 - 13 %    EOSINOPHILS 0 0 - 7 %    BASOPHILS 0 0 - 1 %    IMMATURE GRANULOCYTES 2 (H) 0.0 - 0.5 %    ABS. NEUTROPHILS 11.4 (H) 1.8 - 8.0 K/UL    ABS. LYMPHOCYTES 0.8 0.8 - 3.5 K/UL    ABS. MONOCYTES 0.4 0.0 - 1.0 K/UL    ABS. EOSINOPHILS 0.0 0.0 - 0.4 K/UL    ABS. BASOPHILS 0.0 0.0 - 0.1 K/UL    ABS. IMM.  GRANS. 0.2 (H) 0.00 - 0.04 K/UL    DF AUTOMATED     RENAL FUNCTION PANEL    Collection Time: 22  2:55 AM   Result Value Ref Range    Sodium 132 (L) 136 - 145 mmol/L    Potassium 4.5 3.5 - 5.1 mmol/L    Chloride 92 (L) 97 - 108 mmol/L    CO2 38 (H) 21 - 32 mmol/L    Anion gap 2 (L) 5 - 15 mmol/L    Glucose 161 (H) 65 - 100 mg/dL    BUN 30 (H) 6 - 20 MG/DL    Creatinine 0.56 0.55 - 1.02 MG/DL    BUN/Creatinine ratio 54 (H) 12 - 20      GFR est AA >60 >60 ml/min/1.73m2    GFR est non-AA >60 >60 ml/min/1.73m2    Calcium 9.5 8.5 - 10.1 MG/DL    Phosphorus 3.6 2.6 - 4.7 MG/DL    Albumin 2.5 (L) 3.5 - 5.0 g/dL   MAGNESIUM    Collection Time: 04/23/22  2:55 AM   Result Value Ref Range    Magnesium 2.4 1.6 - 2.4 mg/dL   GLUCOSE, POC    Collection Time: 04/23/22  7:09 AM   Result Value Ref Range    Glucose (POC) 158 (H) 65 - 117 mg/dL    Performed by Rajesh Caballero    GLUCOSE, POC    Collection Time: 04/23/22 12:27 PM   Result Value Ref Range    Glucose (POC) 210 (H) 65 - 117 mg/dL    Performed by Gerry Ortega PCT    GLUCOSE, POC    Collection Time: 04/23/22  4:19 PM   Result Value Ref Range    Glucose (POC) 151 (H) 65 - 117 mg/dL    Performed by Gerry Ortega PCT    GLUCOSE, POC    Collection Time: 04/23/22  9:02 PM   Result Value Ref Range    Glucose (POC) 199 (H) 65 - 117 mg/dL    Performed by Rajesh Caballero      No results found for this visit on 04/21/22. All Micro Results     Procedure Component Value Units Date/Time    CULTURE, URINE [[id number]] Collected: 04/23/22 1109    Order Status: Completed Specimen: Urine from Clean catch Updated: 04/23/22 1424    CULTURE, URINE [[id number]] Collected: 04/21/22 0531    Order Status: Completed Specimen: Urine Updated: 04/22/22 0719     Special Requests: --        NO SPECIAL REQUESTS  Reflexed from [id number]       Belt Count --        >100,000  COLONIES/mL       Culture result:       >2 ORGANISMS - CONTAMINATED SPECIMEN. SUGGEST RECOLLECTION            Chest x-ray- IMPRESSION  Moderately severe pulmonary edema, right upper lobe atelectasis, and  small bilateral pleural effusions.       Assessment/Plan:   Acute respiratory failure  Acute congestive heart failure exacerbation  Acute COPD exacerbation  -Continue oxygen by nasal cannula. Continue Lasix 40 mg IV twice daily. Continue Solu-Medrol, DuoNeb. Continue doxycycline. Wean oxygen as tolerated. -Cardiology is following.  -Check BMP in a.m. tomorrow    Abnormal UA  -UA is abnormal.  Urine culture showed mixed growth and recommended to recollect. We will recollect urine culture     Left buttock wounds-continue local wound care, general surgery evaluation requested by previous providers. Pain controlled with medications adjusted today.     H/O P A.  Fib/CHF last EF 60%  -Per patient she is not on any blood thinner and not taking aspirin because she getting nosebleeds with aspirin  -Multiple prior cardioversion procedures  - f/u Echo  -Cardiology on case follow recommendations        DM2 vs steroid induced hyperglycemia  -does not appear to be on medications, A1C 6.0 7/2020  -since she will be on steroids,  Cont sliding scale,       A1c level 5.4     Hypertension    controlled       Morbid obesity/Impaired mobility -reportedly mostly bedbound at home     Abnormal UA- suspect contaminant external recement, follow-up urine cultures, already on antibiotics      Code Status: Full  Surrogate Decision Keosawyer Zimmer, daughter                 Kianna Cadet [phone]       DVT Prophylaxis:  Subcu Lovenox  Dispo: TBD        Signed By: Nyasia Tran MD     April 23, 2022

## 2022-04-24 NOTE — PROGRESS NOTES
Critical results called to unit: Leonid paged and informed; patient was actively wearing Bipap at time of lab draw.         04/24/22 0539   Critical Result Types   Type of Critical Result Laboratory   Critical Lab Result Types   Critical Lab Value   (CO2 43)   Notification Information   Notified By (Name) Mary Barba RN   Verbal Readback Provided Yes   Provider Notification   Was Provider Notified Yes   Name of Provider Leonid   Time Provider Notified 2508   Date Provider Notified 04/24/22

## 2022-04-24 NOTE — PROGRESS NOTES
1900  Bedside shift change report given to Josephine Kolb RN (oncoming nurse) by Lilian Bonilla RN (offgoing nurse). Report included the following information SBAR.       2245 TRANSFER - OUT REPORT:    Verbal report given to Abebe Chen RN on Elva Diez  being transferred to 2110 (unit) for routine progression of care       Report consisted of patients Situation, Background, Assessment and   Recommendations(SBAR). Information from the following report(s) SBAR was reviewed with the receiving nurse. Lines:   Peripheral IV 04/21/22 Left;Posterior Forearm (Active)   Site Assessment Clean, dry, & intact 04/23/22 1930   Phlebitis Assessment 0 04/23/22 1930   Infiltration Assessment 0 04/23/22 1930   Dressing Status Clean, dry, & intact 04/23/22 1930   Dressing Type Transparent 04/23/22 1930   Hub Color/Line Status Pink;Capped;Flushed 04/23/22 1930   Action Taken Open ports on tubing capped 04/23/22 1930   Alcohol Cap Used Yes 04/23/22 1930        Opportunity for questions and clarification was provided.       Patient transported with:   Monitor  O2 @ 5 liters  Registered Nurse

## 2022-04-24 NOTE — PROGRESS NOTES
Problem: Falls - Risk of  Goal: *Absence of Falls  Description: Document Jessika Catarino Fall Risk and appropriate interventions in the flowsheet. Outcome: Progressing Towards Goal  Note: Fall Risk Interventions:  Mobility Interventions: Bed/chair exit alarm,Patient to call before getting OOB         Medication Interventions: Bed/chair exit alarm    Elimination Interventions: Bed/chair exit alarm              Problem: Patient Education: Go to Patient Education Activity  Goal: Patient/Family Education  Outcome: Progressing Towards Goal     Problem: Falls - Risk of  Goal: *Absence of Falls  Description: Document Jessika Catarino Fall Risk and appropriate interventions in the flowsheet. Outcome: Progressing Towards Goal  Note: Fall Risk Interventions:  Mobility Interventions: Bed/chair exit alarm,Patient to call before getting OOB         Medication Interventions: Bed/chair exit alarm    Elimination Interventions: Bed/chair exit alarm              Problem: Patient Education: Go to Patient Education Activity  Goal: Patient/Family Education  Outcome: Progressing Towards Goal     Problem: Activity Intolerance  Goal: *Oxygen saturation during activity within specified parameters  Outcome: Progressing Towards Goal  Goal: *Able to remain out of bed as prescribed  Outcome: Progressing Towards Goal     Problem: Patient Education: Go to Patient Education Activity  Goal: Patient/Family Education  Outcome: Progressing Towards Goal     Problem: Diabetes Self-Management  Goal: *Disease process and treatment process  Description: Define diabetes and identify own type of diabetes; list 3 options for treating diabetes. Outcome: Progressing Towards Goal  Goal: *Incorporating nutritional management into lifestyle  Description: Describe effect of type, amount and timing of food on blood glucose; list 3 methods for planning meals.   Outcome: Progressing Towards Goal  Goal: *Incorporating physical activity into lifestyle  Description: State effect of exercise on blood glucose levels. Outcome: Progressing Towards Goal  Goal: *Developing strategies to promote health/change behavior  Description: Define the ABC's of diabetes; identify appropriate screenings, schedule and personal plan for screenings. Outcome: Progressing Towards Goal  Goal: *Using medications safely  Description: State effect of diabetes medications on diabetes; name diabetes medication taking, action and side effects. Outcome: Progressing Towards Goal  Goal: *Monitoring blood glucose, interpreting and using results  Description: Identify recommended blood glucose targets  and personal targets. Outcome: Progressing Towards Goal  Goal: *Prevention, detection, treatment of acute complications  Description: List symptoms of hyper- and hypoglycemia; describe how to treat low blood sugar and actions for lowering  high blood glucose level. Outcome: Progressing Towards Goal  Goal: *Prevention, detection and treatment of chronic complications  Description: Define the natural course of diabetes and describe the relationship of blood glucose levels to long term complications of diabetes. Outcome: Progressing Towards Goal  Goal: *Developing strategies to address psychosocial issues  Description: Describe feelings about living with diabetes; identify support needed and support network  Outcome: Progressing Towards Goal  Goal: *Insulin pump training  Outcome: Progressing Towards Goal  Goal: *Sick day guidelines  Outcome: Progressing Towards Goal  Goal: *Patient Specific Goal (EDIT GOAL, INSERT TEXT)  Outcome: Progressing Towards Goal     Problem: Patient Education: Go to Patient Education Activity  Goal: Patient/Family Education  Outcome: Progressing Towards Goal     Problem: Pressure Injury - Risk of  Goal: *Prevention of pressure injury  Description: Document Gaston Scale and appropriate interventions in the flowsheet.   Outcome: Progressing Towards Goal     Problem: Patient Education: Go to Patient Education Activity  Goal: Patient/Family Education  Outcome: Progressing Towards Goal     Problem: Impaired Skin Integrity/Pressure Injury Treatment  Goal: *Improvement of Existing Pressure Injury  Outcome: Progressing Towards Goal  Goal: *Prevention of pressure injury  Description: Document Gaston Scale and appropriate interventions in the flowsheet.   Outcome: Progressing Towards Goal

## 2022-04-25 LAB
ANION GAP SERPL CALC-SCNC: 2 MMOL/L (ref 5–15)
ARTERIAL PATENCY WRIST A: YES
BASE EXCESS BLDA CALC-SCNC: 14 MMOL/L
BDY SITE: ABNORMAL
BREATHS.SPONTANEOUS ON VENT: 15
BUN SERPL-MCNC: 35 MG/DL (ref 6–20)
BUN/CREAT SERPL: 60 (ref 12–20)
CALCIUM SERPL-MCNC: 9.1 MG/DL (ref 8.5–10.1)
CHLORIDE SERPL-SCNC: 92 MMOL/L (ref 97–108)
CO2 SERPL-SCNC: 44 MMOL/L (ref 21–32)
CREAT SERPL-MCNC: 0.58 MG/DL (ref 0.55–1.02)
FIO2 ON VENT: 45 %
GAS FLOW.O2 SETTING OXYMISER: 8 L/MIN
GLUCOSE BLD STRIP.AUTO-MCNC: 193 MG/DL (ref 65–117)
GLUCOSE BLD STRIP.AUTO-MCNC: 196 MG/DL (ref 65–117)
GLUCOSE BLD STRIP.AUTO-MCNC: 198 MG/DL (ref 65–117)
GLUCOSE BLD STRIP.AUTO-MCNC: 210 MG/DL (ref 65–117)
GLUCOSE SERPL-MCNC: 185 MG/DL (ref 65–100)
HCO3 BLDA-SCNC: 43 MMOL/L (ref 22–26)
PCO2 BLDA: 76 MMHG (ref 35–45)
PEEP RESPIRATORY: 6 CM[H2O]
PH BLDA: 7.37 [PH] (ref 7.35–7.45)
PO2 BLDA: 104 MMHG (ref 80–100)
POTASSIUM SERPL-SCNC: 3.4 MMOL/L (ref 3.5–5.1)
SAO2 % BLD: 97 % (ref 92–97)
SAO2% DEVICE SAO2% SENSOR NAME: ABNORMAL
SERVICE CMNT-IMP: ABNORMAL
SODIUM SERPL-SCNC: 138 MMOL/L (ref 136–145)
SPECIMEN SITE: ABNORMAL
VENTILATION MODE VENT: ABNORMAL
VT SETTING VENT: 400 ML

## 2022-04-25 PROCEDURE — 74011250637 HC RX REV CODE- 250/637: Performed by: INTERNAL MEDICINE

## 2022-04-25 PROCEDURE — 74011000250 HC RX REV CODE- 250: Performed by: EMERGENCY MEDICINE

## 2022-04-25 PROCEDURE — 74011250636 HC RX REV CODE- 250/636: Performed by: HOSPITALIST

## 2022-04-25 PROCEDURE — 82803 BLOOD GASES ANY COMBINATION: CPT

## 2022-04-25 PROCEDURE — 74011250637 HC RX REV CODE- 250/637: Performed by: HOSPITALIST

## 2022-04-25 PROCEDURE — 94660 CPAP INITIATION&MGMT: CPT

## 2022-04-25 PROCEDURE — 80048 BASIC METABOLIC PNL TOTAL CA: CPT

## 2022-04-25 PROCEDURE — 94762 N-INVAS EAR/PLS OXIMTRY CONT: CPT

## 2022-04-25 PROCEDURE — 65270000029 HC RM PRIVATE

## 2022-04-25 PROCEDURE — 74011000258 HC RX REV CODE- 258: Performed by: HOSPITALIST

## 2022-04-25 PROCEDURE — 36600 WITHDRAWAL OF ARTERIAL BLOOD: CPT

## 2022-04-25 PROCEDURE — 82962 GLUCOSE BLOOD TEST: CPT

## 2022-04-25 PROCEDURE — 94640 AIRWAY INHALATION TREATMENT: CPT

## 2022-04-25 PROCEDURE — 74011636637 HC RX REV CODE- 636/637: Performed by: HOSPITALIST

## 2022-04-25 PROCEDURE — 74011000250 HC RX REV CODE- 250: Performed by: HOSPITALIST

## 2022-04-25 PROCEDURE — 36415 COLL VENOUS BLD VENIPUNCTURE: CPT

## 2022-04-25 PROCEDURE — 77010033678 HC OXYGEN DAILY

## 2022-04-25 PROCEDURE — 74011250636 HC RX REV CODE- 250/636: Performed by: INTERNAL MEDICINE

## 2022-04-25 RX ORDER — POTASSIUM CHLORIDE 750 MG/1
40 TABLET, FILM COATED, EXTENDED RELEASE ORAL
Status: DISCONTINUED | OUTPATIENT
Start: 2022-04-25 | End: 2022-04-25 | Stop reason: SDUPTHER

## 2022-04-25 RX ORDER — POTASSIUM CHLORIDE 750 MG/1
40 TABLET, FILM COATED, EXTENDED RELEASE ORAL
Status: COMPLETED | OUTPATIENT
Start: 2022-04-25 | End: 2022-04-25

## 2022-04-25 RX ADMIN — Medication: at 08:57

## 2022-04-25 RX ADMIN — CASTOR OIL AND BALSAM, PERU: 788; 87 OINTMENT TOPICAL at 08:57

## 2022-04-25 RX ADMIN — Medication 2 UNITS: at 21:06

## 2022-04-25 RX ADMIN — ARFORMOTEROL TARTRATE: 15 SOLUTION RESPIRATORY (INHALATION) at 07:32

## 2022-04-25 RX ADMIN — METHYLPREDNISOLONE SODIUM SUCCINATE 40 MG: 40 INJECTION, POWDER, FOR SOLUTION INTRAMUSCULAR; INTRAVENOUS at 05:24

## 2022-04-25 RX ADMIN — CASTOR OIL AND BALSAM, PERU: 788; 87 OINTMENT TOPICAL at 21:11

## 2022-04-25 RX ADMIN — OXYCODONE AND ACETAMINOPHEN 2 TABLET: 5; 325 TABLET ORAL at 10:41

## 2022-04-25 RX ADMIN — Medication 2 UNITS: at 13:08

## 2022-04-25 RX ADMIN — FUROSEMIDE 40 MG: 40 TABLET ORAL at 08:56

## 2022-04-25 RX ADMIN — ENOXAPARIN SODIUM 30 MG: 100 INJECTION SUBCUTANEOUS at 08:56

## 2022-04-25 RX ADMIN — ARFORMOTEROL TARTRATE: 15 SOLUTION RESPIRATORY (INHALATION) at 21:06

## 2022-04-25 RX ADMIN — SODIUM CHLORIDE, PRESERVATIVE FREE 10 ML: 5 INJECTION INTRAVENOUS at 05:24

## 2022-04-25 RX ADMIN — DOXYCYCLINE 100 MG: 100 INJECTION, POWDER, LYOPHILIZED, FOR SOLUTION INTRAVENOUS at 21:03

## 2022-04-25 RX ADMIN — ATORVASTATIN CALCIUM 10 MG: 10 TABLET, FILM COATED ORAL at 21:03

## 2022-04-25 RX ADMIN — OXYCODONE AND ACETAMINOPHEN 2 TABLET: 5; 325 TABLET ORAL at 21:02

## 2022-04-25 RX ADMIN — METOPROLOL TARTRATE 25 MG: 25 TABLET, FILM COATED ORAL at 16:57

## 2022-04-25 RX ADMIN — METHYLPREDNISOLONE SODIUM SUCCINATE 40 MG: 40 INJECTION, POWDER, FOR SOLUTION INTRAMUSCULAR; INTRAVENOUS at 13:08

## 2022-04-25 RX ADMIN — ENOXAPARIN SODIUM 30 MG: 100 INJECTION SUBCUTANEOUS at 21:03

## 2022-04-25 RX ADMIN — ASPIRIN 81 MG: 81 TABLET, CHEWABLE ORAL at 08:56

## 2022-04-25 RX ADMIN — SODIUM CHLORIDE, PRESERVATIVE FREE 10 ML: 5 INJECTION INTRAVENOUS at 13:09

## 2022-04-25 RX ADMIN — Medication: at 16:58

## 2022-04-25 RX ADMIN — METOPROLOL TARTRATE 25 MG: 25 TABLET, FILM COATED ORAL at 08:56

## 2022-04-25 RX ADMIN — DOXYCYCLINE 100 MG: 100 INJECTION, POWDER, LYOPHILIZED, FOR SOLUTION INTRAVENOUS at 08:56

## 2022-04-25 RX ADMIN — MICONAZOLE NITRATE: 20 CREAM TOPICAL at 21:11

## 2022-04-25 RX ADMIN — MICONAZOLE NITRATE: 20 CREAM TOPICAL at 08:57

## 2022-04-25 RX ADMIN — SODIUM CHLORIDE, PRESERVATIVE FREE 10 ML: 5 INJECTION INTRAVENOUS at 21:11

## 2022-04-25 RX ADMIN — Medication 2 UNITS: at 16:57

## 2022-04-25 RX ADMIN — METHYLPREDNISOLONE SODIUM SUCCINATE 40 MG: 40 INJECTION, POWDER, FOR SOLUTION INTRAMUSCULAR; INTRAVENOUS at 21:03

## 2022-04-25 RX ADMIN — POTASSIUM CHLORIDE 40 MEQ: 750 TABLET, EXTENDED RELEASE ORAL at 10:41

## 2022-04-25 RX ADMIN — Medication 2 UNITS: at 08:56

## 2022-04-25 NOTE — PROGRESS NOTES
Hospitalist Progress Note    NAME: Josie Schaefer   :  1960   MRN:  [id number]     Subjective:   Daily Progress Note: 2022 7:53 AM    Pelon Zamorano a 57 y. o. female, with past medical history of chronic hypoxic and hypercapnic respiratory failure with suspected COPD on 4 L nasal cannula at baseline, probable JOSLYN and obesity hypoventilation syndrome on CPAP at night, morbid obesity, refractory A. fib, CHF with normal ejection fraction on Lasix at home 40 mg daily, DM2, HTN, and HLD  who presents via  EMS to the ED with c/o shortness of breath      Chief complaint: Shortness of breath  Shortness of breath is improving. She is on 4 L nasal cannula. She is normally on 3 L at home. No chest pain. Mild cough. No phlegm.   No fever    Current Facility-Administered Medications   Medication Dose Route Frequency    furosemide (LASIX) tablet 40 mg  40 mg Oral DAILY    acetaZOLAMIDE (DIAMOX) tablet 250 mg  250 mg Oral BID    albuterol-ipratropium (DUO-NEB) 2.5 MG-0.5 MG/3 ML  3 mL Nebulization Q4H PRN    enoxaparin (LOVENOX) injection 30 mg  30 mg SubCUTAneous Q12H    oxyCODONE-acetaminophen (PERCOCET) 5-325 mg per tablet 2 Tablet  2 Tablet Oral Q6H PRN    sodium chloride (NS) flush 5-40 mL  5-40 mL IntraVENous Q8H    sodium chloride (NS) flush 5-40 mL  5-40 mL IntraVENous PRN    aspirin chewable tablet 81 mg  81 mg Oral DAILY    atorvastatin (LIPITOR) tablet 10 mg  10 mg Oral QHS    arformoterol 15 mcg/budesonide 0.25 mg neb solution   Nebulization BID RT    metoprolol tartrate (LOPRESSOR) tablet 25 mg  25 mg Oral BID    methylPREDNISolone (PF) (SOLU-MEDROL) injection 40 mg  40 mg IntraVENous Q8H    albuterol (PROVENTIL VENTOLIN) nebulizer solution 2.5 mg  2.5 mg Inhalation Q4H PRN    doxycycline (VIBRAMYCIN) 100 mg in 0.9% sodium chloride (MBP/ADV) 100 mL MBP  100 mg IntraVENous Q12H    acetaminophen (TYLENOL) tablet 650 mg  650 mg Oral Q6H PRN    Or    acetaminophen (TYLENOL) suppository 650 mg  650 mg Rectal Q6H PRN    polyethylene glycol (MIRALAX) packet 17 g  17 g Oral DAILY PRN    ondansetron (ZOFRAN ODT) tablet 4 mg  4 mg Oral Q8H PRN    Or    ondansetron (ZOFRAN) injection 4 mg  4 mg IntraVENous Q6H PRN    insulin lispro (HUMALOG) injection   SubCUTAneous AC&HS    glucose chewable tablet 16 g  4 Tablet Oral PRN    glucagon (GLUCAGEN) injection 1 mg  1 mg IntraMUSCular PRN    dextrose 10% infusion 0-250 mL  0-250 mL IntraVENous PRN    hydrALAZINE (APRESOLINE) 20 mg/mL injection 20 mg  20 mg IntraVENous Q6H PRN    ammonium lactate (LAC-HYDRIN) 12 % lotion   Topical BID    miconazole (SECURA) 2 % extra thick cream   Topical BID    balsam peru-castor oiL (VENELEX) ointment   Topical BID          Objective:     Visit Vitals  BP (!) 160/78 (BP 1 Location: Left lower arm, BP Patient Position: Supine)   Pulse 86   Temp 98 °F (36.7 °C)   Resp 16   Ht 5' 3\" (1.6 m)   Wt 130.2 kg (287 lb)   SpO2 94%   BMI 50.84 kg/m²    O2 Flow Rate (L/min): 4 l/min O2 Device: Nasal cannula    Temp (24hrs), Av.4 °F (36.9 °C), Min:98 °F (36.7 °C), Max:98.7 °F (37.1 °C)        PHYSICAL EXAM:  General: Comfortable, not in distress  Neck Short  CVS regular rate and rhythm, no murmurs  Respiratory bilateral air entry present, wheezing present, basal crackles present  Abdomen morbidly obese, soft, nontender  Extremities 3+ edema  Neuro alert, normal speech, moving all 4 extremities  Psych flat affect  Skin rash in groin area        Data Review    Recent Results (from the past 24 hour(s))   METABOLIC PANEL, BASIC    Collection Time: 22  4:29 AM   Result Value Ref Range    Sodium 136 136 - 145 mmol/L    Potassium 4.1 3.5 - 5.1 mmol/L    Chloride 92 (L) 97 - 108 mmol/L    CO2 43 (HH) 21 - 32 mmol/L    Anion gap 1 (L) 5 - 15 mmol/L    Glucose 169 (H) 65 - 100 mg/dL    BUN 38 (H) 6 - 20 MG/DL    Creatinine 0.61 0.55 - 1.02 MG/DL    BUN/Creatinine ratio 62 (H) 12 - 20      GFR est AA >60 >60 ml/min/1.73m2    GFR est non-AA >60 >60 ml/min/1.73m2    Calcium 9.2 8.5 - 10.1 MG/DL   GLUCOSE, POC    Collection Time: 04/24/22  7:36 AM   Result Value Ref Range    Glucose (POC) 160 (H) 65 - 117 mg/dL    Performed by [employer] PCT    GLUCOSE, POC    Collection Time: 04/24/22 10:43 AM   Result Value Ref Range    Glucose (POC) 243 (H) 65 - 117 mg/dL    Performed by [employer] PCT    GLUCOSE, POC    Collection Time: 04/24/22  4:25 PM   Result Value Ref Range    Glucose (POC) 149 (H) 65 - 117 mg/dL    Performed by Mychal Alejandra, POC    Collection Time: 04/24/22  9:35 PM   Result Value Ref Range    Glucose (POC) 190 (H) 65 - 117 mg/dL    Performed by Maxime Padron (TIESHA RN)      No results found for this visit on 04/21/22. All Micro Results     Procedure Component Value Units Date/Time    CULTURE, URINE [[id number]] Collected: 04/23/22 1109    Order Status: Completed Specimen: Urine from Clean catch Updated: 04/24/22 1140     Special Requests: NO SPECIAL REQUESTS        [place]urel Count --        [id number]  COLONIES/mL       Culture result:       >2 ORGANISMS - CONTAMINATED SPECIMEN. SUGGEST RECOLLECTION          CULTURE, URINE [[id number]] Collected: 04/21/22 0531    Order Status: Completed Specimen: Urine Updated: 04/22/22 0719     Special Requests: --        NO SPECIAL REQUESTS  Reflexed from [id number]       [place]urel Count --        >100,000  COLONIES/mL       Culture result:       >2 ORGANISMS - CONTAMINATED SPECIMEN. SUGGEST RECOLLECTION            Chest x-ray- IMPRESSION  Moderately severe pulmonary edema, right upper lobe atelectasis, and  small bilateral pleural effusions. Assessment/Plan:   Acute respiratory failure  Acute congestive heart failure exacerbation  Acute COPD exacerbation  -Continue oxygen by nasal cannula, currently on 4 L and is on 3 L at baseline  -Wean Solu-Medrol, continue DuoNeb.   Continue doxycycline.    -Check BMP in a.m. tomorrow    Metabolic alkalosis due to diuretics and also compensation from respiratory acidosis  -Bicarb is 44. We will add Diamox. Check BMP in a.m. tomorrow. Abnormal UA  -UA is abnormal.  Urine culture showed mixed growth and recommended to recollect. -Urine culture was sent twice but both times it was contaminated. Patient currently does not report any dysuria urgency     Left buttock wounds: Continue wound care.     H/O P A. Fib  -Per patient she is not on any blood thinner and not taking aspirin because she getting nosebleeds with aspirin  -Multiple prior cardioversion procedures  -Continue metoprolol.   Continue aspirin        DM2 vs steroid induced hyperglycemia  -does not appear to be on medications, A1C 6.0 7/2020  -since she will be on steroids,  Cont sliding scale,       A1c level 5.4     Hypertension controlled       Morbid obesity/Impaired mobility -reportedly mostly bedbound at home    PT OT eval  Up to chair    Code Status: Full  Surrogate Decision Sharene Brunner, daughter    TOM: 4/26                 Kianna Cadet [phone]       DVT Prophylaxis:  Subcu Lovenox  Dispo: TBD        Signed By: Kalli Resendez MD     April 24, 2022

## 2022-04-25 NOTE — PROGRESS NOTES
Pulmonary, Critical Care, and Sleep Medicine      Name: Nahs Burnette MRN: [id number]   : 1960 Hospital: [facility]   Date: 2022  Admission date: 2022 Hospital Day: 5       IMPRESSION:   1. Acute chronic respiratory failure with Hypoxia with chronic Hypercapnea; home O2, NIV  2. Likely Asthma (vs COPD) with exacerbation, no PFTs available; normal eosinophils, nl proBNP  3. Former smoker  4. Suspected JOSLYN and /or OHS  5. (COPD OHS Thoracic Restrictive disease ) Restrictive disease from obesity J84.4 and E 66.8  6. Afib, refractory  7. DM   8. Non-ambulatory at baseline due to arthritis, Obesity, SOB   Body mass index is 53.5 kg/m². 9. Prognosis guarded       RECOMMENDATIONS/PLAN:   1. Supplemental O2 to keep sats > 90%  2. Continue NIV at bedtime   3. Case management to assign pt home visiting doc, PCP  4. Case management to ask [employer] to service her NIV at home  5. F/u  after discharge, will set up. Not seen in our office since 2020!!!  6. Aspiration precautions  7. Bronchial hygiene with respiratory therapy techniques, bronchodilators  8. DVT prophylaxis   9. Prescription drug management with home med reconciliation reviewed  10.  Will follow prn, call if any questions          [x] High complexity decision making was performed  [x] See my orders for details      Subjective/Initial History:     No acute complaints  No acute distress  Tolerating NIV overnight         MAR reviewed and pertinent medications noted or modified as needed     Current Facility-Administered Medications   Medication    furosemide (LASIX) tablet 40 mg    acetaZOLAMIDE (DIAMOX) tablet 250 mg    albuterol-ipratropium (DUO-NEB) 2.5 MG-0.5 MG/3 ML    enoxaparin (LOVENOX) injection 30 mg    oxyCODONE-acetaminophen (PERCOCET) 5-325 mg per tablet 2 Tablet    sodium chloride (NS) flush 5-40 mL    sodium chloride (NS) flush 5-40 mL    aspirin chewable tablet 81 mg    atorvastatin (LIPITOR) tablet 10 mg    arformoterol 15 mcg/budesonide 0.25 mg neb solution    metoprolol tartrate (LOPRESSOR) tablet 25 mg    methylPREDNISolone (PF) (SOLU-MEDROL) injection 40 mg    albuterol (PROVENTIL VENTOLIN) nebulizer solution 2.5 mg    doxycycline (VIBRAMYCIN) 100 mg in 0.9% sodium chloride (MBP/ADV) 100 mL MBP    acetaminophen (TYLENOL) tablet 650 mg    Or    acetaminophen (TYLENOL) suppository 650 mg    polyethylene glycol (MIRALAX) packet 17 g    ondansetron (ZOFRAN ODT) tablet 4 mg    Or    ondansetron (ZOFRAN) injection 4 mg    insulin lispro (HUMALOG) injection    glucose chewable tablet 16 g    glucagon (GLUCAGEN) injection 1 mg    dextrose 10% infusion 0-250 mL    hydrALAZINE (APRESOLINE) 20 mg/mL injection 20 mg    ammonium lactate (LAC-HYDRIN) 12 % lotion    miconazole (SECURA) 2 % extra thick cream    balsam peru-castor oiL (VENELEX) ointment           ROS:A comprehensive review of systems was negative except for that written in the HPI. Objective:     Vital Signs: Telemetry:    normal sinus rhythm Intake/Output:   Visit Vitals  /76   Pulse 86   Temp 98.3 °F (36.8 °C)   Resp 18   Ht 5' 3\" (1.6 m)   Wt 137 kg (302 lb)   SpO2 92%   BMI 53.50 kg/m²       Temp (24hrs), Av.2 °F (36.8 °C), Min:97 °F (36.1 °C), Max:98.7 °F (37.1 °C)        O2 Device: Nasal cannula O2 Flow Rate (L/min): 4 l/min       Wt Readings from Last 4 Encounters:   22 137 kg (302 lb)   20 131.5 kg (290 lb)   20 136 kg (299 lb 13.2 oz)   20 138.1 kg (304 lb 8 oz)          Intake/Output Summary (Last 24 hours) at 2022 0854  Last data filed at 2022 0103  Gross per 24 hour   Intake --   Output 1200 ml   Net -1200 ml       Last shift:      No intake/output data recorded.   Last 3 shifts:  1901 -  0700  In: -   Out: 1200 [Urine:1200]       Physical Exam:   General:  female; moderately ill;  NC;  HEENT: NCAT, poor dentition, lips and mucosa dry  Eyes: anicteric; conjunctiva clear  Neck: no nodes, no accessory MM use. Chest: no deformity,   Cardiac: regular rate, rhythm; no murmur  Lungs: distant breath sounds; no wheezes, no rales, no rhonchi  Abd: soft, NT, hypoactive BS, OBESE,  Ext: no edema; no joint swelling; No clubbing  : No irwin,   Neuro: fluent,  follows commands; generalized weakness  Psych- no agitation, oriented to person; unable to assess  Skin: warm, dry, no cyanosis;   Pulses: 1-2+ Bilateral pedal, radial  Capillary: brisk; pale    Labs:    Recent Labs     04/23/22  0255   WBC 12.9*   HGB 12.4   *     Recent Labs     04/25/22  0452 04/24/22  0429 04/23/22  0255    136 132*   K 3.4* 4.1 4.5   CL 92* 92* 92*   CO2 44* 43* 38*   * 169* 161*   BUN 35* 38* 30*   CREA 0.58 0.61 0.56   CA 9.1 9.2 9.5   MG  --   --  2.4   PHOS  --   --  3.6   ALB  --   --  2.5*     Recent Labs     04/25/22  0615   PH 7.37   PCO2 76*   PO2 104*   HCO3 43*   FIO2 45     No results for input(s): CPK, CKNDX, TROIQ in the last 72 hours. No lab exists for component: CPKMB  Lab Results   Component Value Date/Time    BNP 14 04/19/2009 05:00 PM      Lab Results   Component Value Date/Time    Culture result:  04/23/2022 11:09 AM     >2 ORGANISMS - CONTAMINATED SPECIMEN. SUGGEST RECOLLECTION    Culture result:  04/21/2022 05:31 AM     >2 ORGANISMS - CONTAMINATED SPECIMEN.  SUGGEST RECOLLECTION    Culture result: NO GROWTH 6 DAYS 11/18/2020 07:59 PM     Lab Results   Component Value Date/Time    TSH 1.88 09/08/2014 02:20 PM       Imaging:    CXR Results  (Last 48 hours)    None            This care involved high complexity medical decision making: I personally:  · Reviewed the flowsheet and previous days notes  · Reviewed and summarized records or history from previous days note or discussions with staff, family  · High Risk Drug therapy requiring intensive monitoring for toxicity: eg steroids, antibiotics  · Reviewed and/or ordered Clinical lab tests  · Reviewed images and/or ordered Radiology tests  · Reviewed the patients / Telemetry  · Reviewed and/or adjusted NiPPV settings  · discussed my assessment/management with : Nursing, Daughter Sarah Leblanc for coordination of care    Angelique Alicea MD

## 2022-04-25 NOTE — PROGRESS NOTES
Occupational Therapy Note:    Re-consultation received. Note OT previously screened pt on 4/21/2022 and discharged secondary to pt being dependent at baseline, has been bedbound for two years and has family support for all care. Pt remains inappropriate for acute care OT services, will sign off. Thank you.     Tricia Kaur, OTR/L

## 2022-04-25 NOTE — PROGRESS NOTES
Problem: Falls - Risk of  Goal: *Absence of Falls  Description: Document Erica  Fall Risk and appropriate interventions in the flowsheet. Outcome: Progressing Towards Goal  Note: Fall Risk Interventions:  Mobility Interventions: Bed/chair exit alarm,Patient to call before getting OOB         Medication Interventions: Bed/chair exit alarm    Elimination Interventions: Call light in reach,Stay With Me (per policy),Toilet paper/wipes in reach,Toileting schedule/hourly rounds              Problem: Patient Education: Go to Patient Education Activity  Goal: Patient/Family Education  Outcome: Progressing Towards Goal     Problem: Falls - Risk of  Goal: *Absence of Falls  Description: Document Erica  Fall Risk and appropriate interventions in the flowsheet. Outcome: Progressing Towards Goal  Note: Fall Risk Interventions:  Mobility Interventions: Bed/chair exit alarm,Patient to call before getting OOB         Medication Interventions: Bed/chair exit alarm    Elimination Interventions: Call light in reach,Stay With Me (per policy),Toilet paper/wipes in reach,Toileting schedule/hourly rounds              Problem: Patient Education: Go to Patient Education Activity  Goal: Patient/Family Education  Outcome: Progressing Towards Goal     Problem: Activity Intolerance  Goal: *Oxygen saturation during activity within specified parameters  Outcome: Progressing Towards Goal  Goal: *Able to remain out of bed as prescribed  Outcome: Progressing Towards Goal     Problem: Patient Education: Go to Patient Education Activity  Goal: Patient/Family Education  Outcome: Progressing Towards Goal     Problem: Diabetes Self-Management  Goal: *Disease process and treatment process  Description: Define diabetes and identify own type of diabetes; list 3 options for treating diabetes.   Outcome: Progressing Towards Goal  Goal: *Incorporating nutritional management into lifestyle  Description: Describe effect of type, amount and timing of food on blood glucose; list 3 methods for planning meals. Outcome: Progressing Towards Goal  Goal: *Incorporating physical activity into lifestyle  Description: State effect of exercise on blood glucose levels. Outcome: Progressing Towards Goal  Goal: *Developing strategies to promote health/change behavior  Description: Define the ABC's of diabetes; identify appropriate screenings, schedule and personal plan for screenings. Outcome: Progressing Towards Goal  Goal: *Using medications safely  Description: State effect of diabetes medications on diabetes; name diabetes medication taking, action and side effects. Outcome: Progressing Towards Goal  Goal: *Monitoring blood glucose, interpreting and using results  Description: Identify recommended blood glucose targets  and personal targets. Outcome: Progressing Towards Goal  Goal: *Prevention, detection, treatment of acute complications  Description: List symptoms of hyper- and hypoglycemia; describe how to treat low blood sugar and actions for lowering  high blood glucose level. Outcome: Progressing Towards Goal  Goal: *Prevention, detection and treatment of chronic complications  Description: Define the natural course of diabetes and describe the relationship of blood glucose levels to long term complications of diabetes.   Outcome: Progressing Towards Goal  Goal: *Developing strategies to address psychosocial issues  Description: Describe feelings about living with diabetes; identify support needed and support network  Outcome: Progressing Towards Goal  Goal: *Insulin pump training  Outcome: Progressing Towards Goal  Goal: *Sick day guidelines  Outcome: Progressing Towards Goal  Goal: *Patient Specific Goal (EDIT GOAL, INSERT TEXT)  Outcome: Progressing Towards Goal     Problem: Patient Education: Go to Patient Education Activity  Goal: Patient/Family Education  Outcome: Progressing Towards Goal     Problem: Pressure Injury - Risk of  Goal: *Prevention of pressure injury  Description: Document Gaston Scale and appropriate interventions in the flowsheet. Outcome: Progressing Towards Goal     Problem: Patient Education: Go to Patient Education Activity  Goal: Patient/Family Education  Outcome: Progressing Towards Goal     Problem: Impaired Skin Integrity/Pressure Injury Treatment  Goal: *Improvement of Existing Pressure Injury  Outcome: Progressing Towards Goal  Goal: *Prevention of pressure injury  Description: Document Gaston Scale and appropriate interventions in the flowsheet. Outcome: Progressing Towards Goal     Problem: Patient Education: Go to Patient Education Activity  Goal: Patient/Family Education  Outcome: Progressing Towards Goal     Problem: Chronic Obstructive Pulmonary Disease (COPD)  Goal: *Oxygen saturation during activity within specified parameters  Outcome: Progressing Towards Goal  Goal: *Able to remain out of bed as prescribed  Outcome: Progressing Towards Goal  Goal: *Absence of hypoxia  Outcome: Progressing Towards Goal  Goal: *Optimize nutritional status  Outcome: Progressing Towards Goal     Problem: Patient Education: Go to Patient Education Activity  Goal: Patient/Family Education  Outcome: Progressing Towards Goal     Problem: Surgical Wound Care  Goal: *Non-infected Wound: Absence of infection signs and symptoms  Description: Infection control procedures (eg: clean dressings, clean gloves, hand washing, precautions to isolate wound from contamination, sterile instruments used for wound debridement) should be implemented. Outcome: Progressing Towards Goal  Goal: *Infected Wound: Prevention of further infection and promotion of healing  Description: Consider the use of systemic antibiotics in patients with cellulitis, osteomyelitis, bacteremia, or sepsis if there are no contraindications.   Outcome: Progressing Towards Goal  Goal: *Improvement of existing wound and maintenance of skin integrity  Outcome: Progressing Towards Goal

## 2022-04-25 NOTE — PROGRESS NOTES
Critical called to unit: CO2 44; patient was wearing bipap at time of lab draw.         04/25/22 0537   Critical Result Types   Type of Critical Result Laboratory   Critical Lab Result Types   Critical Lab Value   (CO2 44)   Notification Information   Notified By (Name) lab   Time of Critical Result Notification    Verbal Readback Provided Yes   Provider Notification   Was Provider Notified Yes   Name of Provider Cha Cantu   Time Provider Notified [id number]   Date Provider Notified 04/25/22

## 2022-04-25 NOTE — PROGRESS NOTES
Physical Therapy  PT re-consultation received. Noted PT previously screened patient on 4/21/22 and discharged secondary to patient being dependent at baseline. Patient has been bedbound for over two years and has family support for all care. Patient remains inappropriate for acute care therapy services and will again sign off.    China Hawk, PT, DPT

## 2022-04-25 NOTE — PROGRESS NOTES
Comprehensive Nutrition Assessment    Type and Reason for Visit: Initial    Nutrition Recommendations/Plan:   1. Easy to chew/4 carb choices     Nutrition Assessment:    Patient medically noted for respiratory failure, CHF, and COPD. PMH HTN and DM. Currently receiving an easy to chew/3 carb choice diet. Patient reports a good appetite and eating well. Trouble chewing some items due to missing teeth. Menu provided to allow patient to order meals per her preference. No nutrition questions or concerns reported by patient. Will increase carb allowance to better meet patient's needs. Encouraged intake of meals. Nutrition Related Findings:   K+ 3.4, -[phone], A1c 5.4  Atorvastatin, Lasix, Humalog, Solu-medrol   Wound Type: Deep tissue injury    Current Nutrition Intake & Therapies:  Average Meal Intake: %  Average Supplement Intake: None ordered  ADULT DIET Easy to Chew; 3 carb choices (45 gm/meal)    Anthropometric Measures:  Height: 5' 3\" (160 cm)  Current Body Wt:  137 kg (302 lb 0.5 oz), 262.6 % IBW. Current BMI (kg/m2): 53.5  BMI Category: Obese class 3 (BMI 40.0 or greater)    Estimated Daily Nutrient Needs:  Energy Requirements Based On: Formula  Weight Used for Energy Requirements: Current  Energy (kcal/day): 2029 kcal (BMR 1899 x 1. 2AF -250kcal)  Weight Used for Protein Requirements: Current  Protein (g/day): 110g (0.8 g/kg bw)  Method Used for Fluid Requirements: 1 ml/kcal  Fluid (ml/day): 1800 mL or per MD    Nutrition Diagnosis:   · No nutrition diagnosis at this time      Nutrition Interventions:   Food and/or Nutrient Delivery: Modify current diet  Nutrition Education/Counseling: No recommendations at this time  Coordination of Nutrition Care: Continue to monitor while inpatient    Goals:  Goals:  (PO intake >75% of meals next 5-7 days)    Nutrition Monitoring and Evaluation:   Behavioral-Environmental Outcomes: None identified  Food/Nutrient Intake Outcomes: Food and nutrient intake  Physical Signs/Symptoms Outcomes: Biochemical data,Chewing or swallowing,Weight    Discharge Planning:    Continue current diet    Juwan Rogers RD  Contact: ext [phone]

## 2022-04-26 LAB
ANION GAP SERPL CALC-SCNC: 1 MMOL/L (ref 5–15)
BUN SERPL-MCNC: 33 MG/DL (ref 6–20)
BUN/CREAT SERPL: 59 (ref 12–20)
CALCIUM SERPL-MCNC: 9.3 MG/DL (ref 8.5–10.1)
CHLORIDE SERPL-SCNC: 94 MMOL/L (ref 97–108)
CO2 SERPL-SCNC: 43 MMOL/L (ref 21–32)
CREAT SERPL-MCNC: 0.56 MG/DL (ref 0.55–1.02)
GLUCOSE BLD STRIP.AUTO-MCNC: 168 MG/DL (ref 65–117)
GLUCOSE BLD STRIP.AUTO-MCNC: 171 MG/DL (ref 65–117)
GLUCOSE BLD STRIP.AUTO-MCNC: 192 MG/DL (ref 65–117)
GLUCOSE BLD STRIP.AUTO-MCNC: 218 MG/DL (ref 65–117)
GLUCOSE SERPL-MCNC: 190 MG/DL (ref 65–100)
POTASSIUM SERPL-SCNC: 3.9 MMOL/L (ref 3.5–5.1)
SERVICE CMNT-IMP: ABNORMAL
SODIUM SERPL-SCNC: 138 MMOL/L (ref 136–145)

## 2022-04-26 PROCEDURE — 65270000029 HC RM PRIVATE

## 2022-04-26 PROCEDURE — 74011000250 HC RX REV CODE- 250: Performed by: EMERGENCY MEDICINE

## 2022-04-26 PROCEDURE — 74011250636 HC RX REV CODE- 250/636: Performed by: INTERNAL MEDICINE

## 2022-04-26 PROCEDURE — 94762 N-INVAS EAR/PLS OXIMTRY CONT: CPT

## 2022-04-26 PROCEDURE — 94660 CPAP INITIATION&MGMT: CPT

## 2022-04-26 PROCEDURE — 94640 AIRWAY INHALATION TREATMENT: CPT

## 2022-04-26 PROCEDURE — 74011000250 HC RX REV CODE- 250: Performed by: HOSPITALIST

## 2022-04-26 PROCEDURE — 74011250637 HC RX REV CODE- 250/637: Performed by: HOSPITALIST

## 2022-04-26 PROCEDURE — 94761 N-INVAS EAR/PLS OXIMETRY MLT: CPT

## 2022-04-26 PROCEDURE — 82962 GLUCOSE BLOOD TEST: CPT

## 2022-04-26 PROCEDURE — 74011636637 HC RX REV CODE- 636/637: Performed by: HOSPITALIST

## 2022-04-26 PROCEDURE — 77010033678 HC OXYGEN DAILY

## 2022-04-26 PROCEDURE — 36415 COLL VENOUS BLD VENIPUNCTURE: CPT

## 2022-04-26 PROCEDURE — 74011250637 HC RX REV CODE- 250/637: Performed by: INTERNAL MEDICINE

## 2022-04-26 PROCEDURE — 80048 BASIC METABOLIC PNL TOTAL CA: CPT

## 2022-04-26 RX ORDER — PREDNISONE 20 MG/1
40 TABLET ORAL
Status: DISCONTINUED | OUTPATIENT
Start: 2022-04-27 | End: 2022-04-28 | Stop reason: HOSPADM

## 2022-04-26 RX ORDER — ACETAZOLAMIDE 250 MG/1
500 TABLET ORAL 2 TIMES DAILY
Status: COMPLETED | OUTPATIENT
Start: 2022-04-26 | End: 2022-04-26

## 2022-04-26 RX ADMIN — METOPROLOL TARTRATE 25 MG: 25 TABLET, FILM COATED ORAL at 18:22

## 2022-04-26 RX ADMIN — Medication 2 UNITS: at 11:31

## 2022-04-26 RX ADMIN — SODIUM CHLORIDE, PRESERVATIVE FREE 10 ML: 5 INJECTION INTRAVENOUS at 13:53

## 2022-04-26 RX ADMIN — MICONAZOLE NITRATE: 20 CREAM TOPICAL at 22:55

## 2022-04-26 RX ADMIN — SODIUM CHLORIDE, PRESERVATIVE FREE 10 ML: 5 INJECTION INTRAVENOUS at 22:00

## 2022-04-26 RX ADMIN — MICONAZOLE NITRATE: 20 CREAM TOPICAL at 08:19

## 2022-04-26 RX ADMIN — ARFORMOTEROL TARTRATE: 15 SOLUTION RESPIRATORY (INHALATION) at 07:26

## 2022-04-26 RX ADMIN — ACETAZOLAMIDE 500 MG: 250 TABLET ORAL at 18:22

## 2022-04-26 RX ADMIN — Medication: at 18:22

## 2022-04-26 RX ADMIN — OXYCODONE AND ACETAMINOPHEN 2 TABLET: 5; 325 TABLET ORAL at 14:36

## 2022-04-26 RX ADMIN — ARFORMOTEROL TARTRATE: 15 SOLUTION RESPIRATORY (INHALATION) at 20:45

## 2022-04-26 RX ADMIN — Medication 3 UNITS: at 16:32

## 2022-04-26 RX ADMIN — CASTOR OIL AND BALSAM, PERU: 788; 87 OINTMENT TOPICAL at 22:56

## 2022-04-26 RX ADMIN — METOPROLOL TARTRATE 25 MG: 25 TABLET, FILM COATED ORAL at 08:17

## 2022-04-26 RX ADMIN — ATORVASTATIN CALCIUM 10 MG: 10 TABLET, FILM COATED ORAL at 22:45

## 2022-04-26 RX ADMIN — ASPIRIN 81 MG: 81 TABLET, CHEWABLE ORAL at 08:17

## 2022-04-26 RX ADMIN — METHYLPREDNISOLONE SODIUM SUCCINATE 40 MG: 40 INJECTION, POWDER, FOR SOLUTION INTRAMUSCULAR; INTRAVENOUS at 08:17

## 2022-04-26 RX ADMIN — Medication 2 UNITS: at 08:17

## 2022-04-26 RX ADMIN — OXYCODONE AND ACETAMINOPHEN 2 TABLET: 5; 325 TABLET ORAL at 08:17

## 2022-04-26 RX ADMIN — SODIUM CHLORIDE, PRESERVATIVE FREE 10 ML: 5 INJECTION INTRAVENOUS at 05:18

## 2022-04-26 RX ADMIN — CASTOR OIL AND BALSAM, PERU: 788; 87 OINTMENT TOPICAL at 09:00

## 2022-04-26 RX ADMIN — ENOXAPARIN SODIUM 30 MG: 100 INJECTION SUBCUTANEOUS at 08:17

## 2022-04-26 RX ADMIN — ENOXAPARIN SODIUM 30 MG: 100 INJECTION SUBCUTANEOUS at 22:45

## 2022-04-26 RX ADMIN — Medication: at 08:19

## 2022-04-26 RX ADMIN — ACETAZOLAMIDE 500 MG: 250 TABLET ORAL at 11:26

## 2022-04-26 RX ADMIN — FUROSEMIDE 40 MG: 40 TABLET ORAL at 08:17

## 2022-04-26 NOTE — PROGRESS NOTES
Spiritual Care Assessment/Progress Note  [facility]      NAME: German Azevedo      MRN: [id number]  AGE: 57 y.o.  SEX: female  Voodoo Affiliation: Susana Lantigua   Language: English     4/26/2022     Total Time (in minutes): 10     Spiritual Assessment begun in [facility] 2 MED TELE through conversation with:         [x]Patient        [] Family    [] Friend(s)        Reason for Consult: Initial/Spiritual assessment, patient floor     Spiritual beliefs: (Please include comment if needed)     [x] Identifies with a rosalina tradition:         [] Supported by a rosalina community:            [] Claims no spiritual orientation:           [] Seeking spiritual identity:                [] Adheres to an individual form of spirituality:           [] Not able to assess:                           Identified resources for coping:      [] Prayer                               [] Music                  [] Guided Imagery     [x] Family/friends                 [] Pet visits     [] Devotional reading                         [] Unknown     [] Other:                                               Interventions offered during this visit: (See comments for more details)    Patient Interventions: Affirmation of emotions/emotional suffering,Initial/Spiritual assessment, patient floor,Catharsis/review of pertinent events in supportive environment           Plan of Care:     [] Support spiritual and/or cultural needs    [] Support AMD and/or advance care planning process      [] Support grieving process   [] Coordinate Rites and/or Rituals    [] Coordination with community clergy   [] No spiritual needs identified at this time   [] Detailed Plan of Care below (See Comments)  [] Make referral to Music Therapy  [] Make referral to Pet Therapy     [] Make referral to Addiction services  [] Make referral to [facility]  [] Make referral to Spiritual Care Partner  [] No future visits requested        [x] Contact Spiritual Care for further referrals     Comments:     Initial Spiritual Care Assessment visit for Ms. Francisco J Perales in 2110. Reviewed the chart notes before visit. Patient was alert, no family was present during the visit. Patient was waiting for staff for searching her phone.   Helped her find the phone. She was thankful.  asked any further support could be provided, she denied any needs and wanted to call somebody on the phone.  respected her preference. Advised of  availability.       [id number] [facility] Ace Charlton,Esthela, Trina [address] Provider   Paging Service [phone])

## 2022-04-26 NOTE — PROGRESS NOTES
Bedside shift change report given to Alia Ayala (oncoming nurse) by Checo Ireland (offgoing nurse). Report included the following information SBAR, Kardex, MAR and Recent Results.

## 2022-04-26 NOTE — WOUND CARE
Wound care reassessment for overall skin :  Patient was awake and says that other than being in pain with her knees, she is doing better. The specialty bed is functioning well. Plan: Wound care nurse to see patient on Thursday for left buttocks reassessment and to make a longer term plan for healing.     Светлана Peck RN, BSN, [employer]

## 2022-04-26 NOTE — PROGRESS NOTES
Hospitalist Progress Note    NAME: Jose Almonte   :  1960   MRN:  [id number]     Subjective:   Daily Progress Note: 2022 7:53 AM    Dema Meckel a 57 y. o. female, with past medical history of chronic hypoxic and hypercapnic respiratory failure with suspected COPD on 4 L nasal cannula at baseline, probable JOSLYN and obesity hypoventilation syndrome on CPAP at night, morbid obesity, refractory A. fib, CHF with normal ejection fraction on Lasix at home 40 mg daily, DM2, HTN, and HLD  who presents via  EMS to the ED with c/o shortness of breath      Chief complaint: Shortness of breath  Shortness of breath is improving. She is on 4 L of oxygen by nasal cannula. Reports that she is bedbound.   She feels she is not ready for discharge today  No fever or diarrhea    Current Facility-Administered Medications   Medication Dose Route Frequency    acetaZOLAMIDE (DIAMOX) tablet 500 mg  500 mg Oral BID    [START ON 2022] predniSONE (DELTASONE) tablet 40 mg  40 mg Oral DAILY WITH BREAKFAST    furosemide (LASIX) tablet 40 mg  40 mg Oral DAILY    albuterol-ipratropium (DUO-NEB) 2.5 MG-0.5 MG/3 ML  3 mL Nebulization Q4H PRN    enoxaparin (LOVENOX) injection 30 mg  30 mg SubCUTAneous Q12H    oxyCODONE-acetaminophen (PERCOCET) 5-325 mg per tablet 2 Tablet  2 Tablet Oral Q6H PRN    sodium chloride (NS) flush 5-40 mL  5-40 mL IntraVENous Q8H    sodium chloride (NS) flush 5-40 mL  5-40 mL IntraVENous PRN    aspirin chewable tablet 81 mg  81 mg Oral DAILY    atorvastatin (LIPITOR) tablet 10 mg  10 mg Oral QHS    arformoterol 15 mcg/budesonide 0.25 mg neb solution   Nebulization BID RT    metoprolol tartrate (LOPRESSOR) tablet 25 mg  25 mg Oral BID    albuterol (PROVENTIL VENTOLIN) nebulizer solution 2.5 mg  2.5 mg Inhalation Q4H PRN    acetaminophen (TYLENOL) tablet 650 mg  650 mg Oral Q6H PRN    Or    acetaminophen (TYLENOL) suppository 650 mg  650 mg Rectal Q6H PRN    polyethylene glycol (MIRALAX) packet 17 g  17 g Oral DAILY PRN    ondansetron (ZOFRAN ODT) tablet 4 mg  4 mg Oral Q8H PRN    Or    ondansetron (ZOFRAN) injection 4 mg  4 mg IntraVENous Q6H PRN    insulin lispro (HUMALOG) injection   SubCUTAneous AC&HS    glucose chewable tablet 16 g  4 Tablet Oral PRN    glucagon (GLUCAGEN) injection 1 mg  1 mg IntraMUSCular PRN    dextrose 10% infusion 0-250 mL  0-250 mL IntraVENous PRN    hydrALAZINE (APRESOLINE) 20 mg/mL injection 20 mg  20 mg IntraVENous Q6H PRN    ammonium lactate (LAC-HYDRIN) 12 % lotion   Topical BID    miconazole (SECURA) 2 % extra thick cream   Topical BID    balsam peru-castor oiL (VENELEX) ointment   Topical BID          Objective:     Visit Vitals  BP (!) 173/68   Pulse 79   Temp 97.6 °F (36.4 °C)   Resp 19   Ht 5' 3\" (1.6 m)   Wt 134.7 kg (297 lb)   SpO2 94%   BMI 52.61 kg/m²    O2 Flow Rate (L/min): 4 l/min O2 Device: Nasal cannula    Temp (24hrs), Av.8 °F (36.6 °C), Min:97.3 °F (36.3 °C), Max:98.3 °F (36.8 °C)        PHYSICAL EXAM:  General: Comfortable, not in distress  Neck Short  CVS regular rate and rhythm, no murmurs  Respiratory bilateral air entry present, wheezing present, basal crackles present  Abdomen morbidly obese, soft, nontender  Extremities 3+ edema  Neuro alert, normal speech, moving all 4 extremities  Psych flat affect  Skin rash in groin area        Data Review    Recent Results (from the past 24 hour(s))   GLUCOSE, POC    Collection Time: 22  3:56 PM   Result Value Ref Range    Glucose (POC) 198 (H) 65 - 117 mg/dL    Performed by Rani Baires PCT    GLUCOSE, POC    Collection Time: 22  8:43 PM   Result Value Ref Range    Glucose (POC) 210 (H) 65 - 117 mg/dL    Performed by James Swanson (TIESHA RN)    METABOLIC PANEL, BASIC    Collection Time: 22  5:25 AM   Result Value Ref Range    Sodium 138 136 - 145 mmol/L    Potassium 3.9 3.5 - 5.1 mmol/L    Chloride 94 (L) 97 - 108 mmol/L    CO2 43 (HH) 21 - 32 mmol/L    Anion gap 1 (L) 5 - 15 mmol/L    Glucose 190 (H) 65 - 100 mg/dL    BUN 33 (H) 6 - 20 MG/DL    Creatinine 0.56 0.55 - 1.02 MG/DL    BUN/Creatinine ratio 59 (H) 12 - 20      GFR est AA >60 >60 ml/min/1.73m2    GFR est non-AA >60 >60 ml/min/1.73m2    Calcium 9.3 8.5 - 10.1 MG/DL   GLUCOSE, POC    Collection Time: 04/26/22  7:14 AM   Result Value Ref Range    Glucose (POC) 168 (H) 65 - 117 mg/dL    Performed by [employer], POC    Collection Time: 04/26/22 11:29 AM   Result Value Ref Range    Glucose (POC) 192 (H) 65 - 117 mg/dL    Performed by Lizzie Piña (TRV LPN)      No results found for this visit on 04/21/22. All Micro Results     Procedure Component Value Units Date/Time    CULTURE, URINE [[id number]] Collected: 04/23/22 1109    Order Status: Completed Specimen: Urine from Clean catch Updated: 04/24/22 1140     Special Requests: NO SPECIAL REQUESTS        [place] Count --        [id number]  COLONIES/mL       Culture result:       >2 ORGANISMS - CONTAMINATED SPECIMEN. SUGGEST RECOLLECTION          CULTURE, URINE [[id number]] Collected: 04/21/22 0531    Order Status: Completed Specimen: Urine Updated: 04/22/22 0719     Special Requests: --        NO SPECIAL REQUESTS  Reflexed from [id number]       [place] Count --        >100,000  COLONIES/mL       Culture result:       >2 ORGANISMS - CONTAMINATED SPECIMEN. SUGGEST RECOLLECTION            Chest x-ray- IMPRESSION  Moderately severe pulmonary edema, right upper lobe atelectasis, and  small bilateral pleural effusions. Assessment/Plan:   Acute respiratory failure  Acute congestive heart failure exacerbation  Acute COPD exacerbation  -Continue oxygen by nasal cannula, currently on 4 to 5 L and is on 3 L at baseline  -Wean Solu-Medrol, continue DuoNeb. S/p doxycycline  -Continue Lasix and Diamox due to metabolic alkalosis.   May have to hold the Lasix if bicarb is trending up  -Check BMP in a.m. tomorrow      Metabolic alkalosis due to diuretics and also compensation from respiratory acidosis  -Bicarb is 44. CONT Diamox. Check BMP in a.m. tomorrow. Abnormal UA  -UA is abnormal.  Urine culture showed mixed growth and recommended to recollect. -Urine culture was sent twice but both times it was contaminated. Patient currently does not report any dysuria urgency     Left buttock wounds: Continue wound care.     H/O P A. Fib  -Per patient she is not on any blood thinner and not taking aspirin because she getting nosebleeds with aspirin  -Multiple prior cardioversion procedures  -Continue metoprolol.   Continue aspirin     DM2 vs steroid induced hyperglycemia  -does not appear to be on medications, A1C 6.0 7/2020  -since she will be on steroids,  Cont sliding scale,       A1c level 5.4     Hypertension controlled       Morbid obesity/Impaired mobility -reportedly mostly bedbound at home    PT OT eval  Up to chair    Code Status: Full  Surrogate Decision Harley Miller, daughter    TOM: 4/27 to home with home health if bicarb is trending down  Barriers: Improvement of bicarb                 Kianna Cadet Child [phone]       DVT Prophylaxis:  Subcu Lovenox  Dispo: TBD        Signed By: Ambreen Bean MD     April 26, 2022

## 2022-04-26 NOTE — PROGRESS NOTES
Hospitalist Progress Note    NAME: Bravo Prasad   :  1960   MRN:  [id number]     Subjective:   Daily Progress Note: 2022 7:53 AM    Ronald Mancilla a 57 y. o. female, with past medical history of chronic hypoxic and hypercapnic respiratory failure with suspected COPD on 4 L nasal cannula at baseline, probable JOSLYN and obesity hypoventilation syndrome on CPAP at night, morbid obesity, refractory A. fib, CHF with normal ejection fraction on Lasix at home 40 mg daily, DM2, HTN, and HLD  who presents via  EMS to the ED with c/o shortness of breath      Chief complaint: Shortness of breath  Shortness of breath is improving. She is on 4 L nasal cannula. She is normally on 3 L at home. No chest pain. Mild cough. No phlegm.   No fever    Current Facility-Administered Medications   Medication Dose Route Frequency    methylPREDNISolone (PF) (SOLU-MEDROL) injection 40 mg  40 mg IntraVENous Q12H    furosemide (LASIX) tablet 40 mg  40 mg Oral DAILY    albuterol-ipratropium (DUO-NEB) 2.5 MG-0.5 MG/3 ML  3 mL Nebulization Q4H PRN    enoxaparin (LOVENOX) injection 30 mg  30 mg SubCUTAneous Q12H    oxyCODONE-acetaminophen (PERCOCET) 5-325 mg per tablet 2 Tablet  2 Tablet Oral Q6H PRN    sodium chloride (NS) flush 5-40 mL  5-40 mL IntraVENous Q8H    sodium chloride (NS) flush 5-40 mL  5-40 mL IntraVENous PRN    aspirin chewable tablet 81 mg  81 mg Oral DAILY    atorvastatin (LIPITOR) tablet 10 mg  10 mg Oral QHS    arformoterol 15 mcg/budesonide 0.25 mg neb solution   Nebulization BID RT    metoprolol tartrate (LOPRESSOR) tablet 25 mg  25 mg Oral BID    albuterol (PROVENTIL VENTOLIN) nebulizer solution 2.5 mg  2.5 mg Inhalation Q4H PRN    acetaminophen (TYLENOL) tablet 650 mg  650 mg Oral Q6H PRN    Or    acetaminophen (TYLENOL) suppository 650 mg  650 mg Rectal Q6H PRN    polyethylene glycol (MIRALAX) packet 17 g  17 g Oral DAILY PRN    ondansetron (ZOFRAN ODT) tablet 4 mg  4 mg Oral Q8H PRN    Or    ondansetron (ZOFRAN) injection 4 mg  4 mg IntraVENous Q6H PRN    insulin lispro (HUMALOG) injection   SubCUTAneous AC&HS    glucose chewable tablet 16 g  4 Tablet Oral PRN    glucagon (GLUCAGEN) injection 1 mg  1 mg IntraMUSCular PRN    dextrose 10% infusion 0-250 mL  0-250 mL IntraVENous PRN    hydrALAZINE (APRESOLINE) 20 mg/mL injection 20 mg  20 mg IntraVENous Q6H PRN    ammonium lactate (LAC-HYDRIN) 12 % lotion   Topical BID    miconazole (SECURA) 2 % extra thick cream   Topical BID    balsam peru-castor oiL (VENELEX) ointment   Topical BID          Objective:     Visit Vitals  BP (!) 147/98 (BP 1 Location: Right upper arm)   Pulse 70   Temp 97.8 °F (36.6 °C)   Resp 19   Ht 5' 3\" (1.6 m)   Wt 134.7 kg (297 lb)   SpO2 99%   BMI 52.61 kg/m²    O2 Flow Rate (L/min): 4 l/min O2 Device: Nasal cannula    Temp (24hrs), Av.9 °F (36.6 °C), Min:97.3 °F (36.3 °C), Max:98.3 °F (36.8 °C)        PHYSICAL EXAM:  General: Comfortable, not in distress  Neck Short  CVS regular rate and rhythm, no murmurs  Respiratory bilateral air entry present, wheezing present, basal crackles present  Abdomen morbidly obese, soft, nontender  Extremities 3+ edema  Neuro alert, normal speech, moving all 4 extremities  Psych flat affect  Skin rash in groin area        Data Review    Recent Results (from the past 24 hour(s))   GLUCOSE, POC    Collection Time: 22 11:18 AM   Result Value Ref Range    Glucose (POC) 196 (H) 65 - 117 mg/dL    Performed by Monie Valdez PCT    GLUCOSE, POC    Collection Time: 22  3:56 PM   Result Value Ref Range    Glucose (POC) 198 (H) 65 - 117 mg/dL    Performed by Monie Valdez PCT    GLUCOSE, POC    Collection Time: 22  8:43 PM   Result Value Ref Range    Glucose (POC) 210 (H) 65 - 117 mg/dL    Performed by Jass Gibson (TIESHA RN)    METABOLIC PANEL, BASIC    Collection Time: 04/26/22  5:25 AM   Result Value Ref Range    Sodium 138 136 - 145 mmol/L    Potassium 3.9 3.5 - 5.1 mmol/L    Chloride 94 (L) 97 - 108 mmol/L    CO2 43 (HH) 21 - 32 mmol/L    Anion gap 1 (L) 5 - 15 mmol/L    Glucose 190 (H) 65 - 100 mg/dL    BUN 33 (H) 6 - 20 MG/DL    Creatinine 0.56 0.55 - 1.02 MG/DL    BUN/Creatinine ratio 59 (H) 12 - 20      GFR est AA >60 >60 ml/min/1.73m2    GFR est non-AA >60 >60 ml/min/1.73m2    Calcium 9.3 8.5 - 10.1 MG/DL   GLUCOSE, POC    Collection Time: 04/26/22  7:14 AM   Result Value Ref Range    Glucose (POC) 168 (H) 65 - 117 mg/dL    Performed by Nomi NERI      No results found for this visit on 04/21/22. All Micro Results     Procedure Component Value Units Date/Time    CULTURE, URINE [[id number]] Collected: 04/23/22 1109    Order Status: Completed Specimen: Urine from Clean catch Updated: 04/24/22 1140     Special Requests: NO SPECIAL REQUESTS        [place] Count --        [id number]  COLONIES/mL       Culture result:       >2 ORGANISMS - CONTAMINATED SPECIMEN. SUGGEST RECOLLECTION          CULTURE, URINE [[id number]] Collected: 04/21/22 0531    Order Status: Completed Specimen: Urine Updated: 04/22/22 0719     Special Requests: --        NO SPECIAL REQUESTS  Reflexed from [id number]       [place] Count --        >100,000  COLONIES/mL       Culture result:       >2 ORGANISMS - CONTAMINATED SPECIMEN. SUGGEST RECOLLECTION            Chest x-ray- IMPRESSION  Moderately severe pulmonary edema, right upper lobe atelectasis, and  small bilateral pleural effusions. Assessment/Plan:   Acute respiratory failure  Acute congestive heart failure exacerbation  Acute COPD exacerbation  -Continue oxygen by nasal cannula, currently on 4 L and is on 3 L at baseline  -Wean Solu-Medrol, continue DuoNeb. Continue doxycycline.    -Check BMP in a.m. tomorrow    Metabolic alkalosis due to diuretics and also compensation from respiratory acidosis  -Bicarb is 44. CONT Diamox. Check BMP in a.m. tomorrow.     Abnormal UA  -UA is abnormal.  Urine culture showed mixed growth and recommended to recollect. -Urine culture was sent twice but both times it was contaminated. Patient currently does not report any dysuria urgency     Left buttock wounds: Continue wound care.     H/O P A. Fib  -Per patient she is not on any blood thinner and not taking aspirin because she getting nosebleeds with aspirin  -Multiple prior cardioversion procedures  -Continue metoprolol.   Continue aspirin        DM2 vs steroid induced hyperglycemia  -does not appear to be on medications, A1C 6.0 7/2020  -since she will be on steroids,  Cont sliding scale,       A1c level 5.4     Hypertension controlled       Morbid obesity/Impaired mobility -reportedly mostly bedbound at home    PT OT eval  Up to chair    Code Status: Full  Surrogate Decision Luis Portillo, daughter    TOM: 4/26                 Kianna Cadet Child [phone]       DVT Prophylaxis:  Subcu Lovenox  Dispo: TBD        Signed By: Shanna Fair MD     April 26, 2022

## 2022-04-26 NOTE — PROGRESS NOTES
04/26/22 0630   Critical Result Types   Type of Critical Result Laboratory   Critical Lab Result Types   Critical Lab Value   (CO2 42)   Notification Information   Notified By (Name) lB   Provider Notification   Was Provider Notified Yes   Name of Provider Anthony Ballard   Time Provider Notified [id number]   Date Provider Notified 04/26/22

## 2022-04-27 LAB
ANION GAP SERPL CALC-SCNC: 1 MMOL/L (ref 5–15)
BUN SERPL-MCNC: 39 MG/DL (ref 6–20)
BUN/CREAT SERPL: 67 (ref 12–20)
CALCIUM SERPL-MCNC: 9.3 MG/DL (ref 8.5–10.1)
CHLORIDE SERPL-SCNC: 96 MMOL/L (ref 97–108)
CO2 SERPL-SCNC: 41 MMOL/L (ref 21–32)
CREAT SERPL-MCNC: 0.58 MG/DL (ref 0.55–1.02)
ERYTHROCYTE [DISTWIDTH] IN BLOOD BY AUTOMATED COUNT: 15.1 % (ref 11.5–14.5)
GLUCOSE BLD STRIP.AUTO-MCNC: 121 MG/DL (ref 65–117)
GLUCOSE BLD STRIP.AUTO-MCNC: 142 MG/DL (ref 65–117)
GLUCOSE BLD STRIP.AUTO-MCNC: 149 MG/DL (ref 65–117)
GLUCOSE BLD STRIP.AUTO-MCNC: 258 MG/DL (ref 65–117)
GLUCOSE SERPL-MCNC: 142 MG/DL (ref 65–100)
HCT VFR BLD AUTO: 40 % (ref 35–47)
HGB BLD-MCNC: 11.6 G/DL (ref 11.5–16)
MCH RBC QN AUTO: 30.1 PG (ref 26–34)
MCHC RBC AUTO-ENTMCNC: 29 G/DL (ref 30–36.5)
MCV RBC AUTO: 103.6 FL (ref 80–99)
NRBC # BLD: 0.02 K/UL (ref 0–0.01)
NRBC BLD-RTO: 0.1 PER 100 WBC
PLATELET # BLD AUTO: 150 K/UL (ref 150–400)
PMV BLD AUTO: 12.1 FL (ref 8.9–12.9)
POTASSIUM SERPL-SCNC: 3.8 MMOL/L (ref 3.5–5.1)
RBC # BLD AUTO: 3.86 M/UL (ref 3.8–5.2)
SERVICE CMNT-IMP: ABNORMAL
SODIUM SERPL-SCNC: 138 MMOL/L (ref 136–145)
WBC # BLD AUTO: 17.2 K/UL (ref 3.6–11)

## 2022-04-27 PROCEDURE — 74011250637 HC RX REV CODE- 250/637: Performed by: HOSPITALIST

## 2022-04-27 PROCEDURE — 74011250636 HC RX REV CODE- 250/636: Performed by: INTERNAL MEDICINE

## 2022-04-27 PROCEDURE — 74011000250 HC RX REV CODE- 250: Performed by: EMERGENCY MEDICINE

## 2022-04-27 PROCEDURE — 80048 BASIC METABOLIC PNL TOTAL CA: CPT

## 2022-04-27 PROCEDURE — 74011636637 HC RX REV CODE- 636/637: Performed by: INTERNAL MEDICINE

## 2022-04-27 PROCEDURE — 94640 AIRWAY INHALATION TREATMENT: CPT

## 2022-04-27 PROCEDURE — 77010033678 HC OXYGEN DAILY

## 2022-04-27 PROCEDURE — 74011250637 HC RX REV CODE- 250/637: Performed by: INTERNAL MEDICINE

## 2022-04-27 PROCEDURE — 82962 GLUCOSE BLOOD TEST: CPT

## 2022-04-27 PROCEDURE — 94660 CPAP INITIATION&MGMT: CPT

## 2022-04-27 PROCEDURE — 36415 COLL VENOUS BLD VENIPUNCTURE: CPT

## 2022-04-27 PROCEDURE — 65270000029 HC RM PRIVATE

## 2022-04-27 PROCEDURE — 74011636637 HC RX REV CODE- 636/637: Performed by: HOSPITALIST

## 2022-04-27 PROCEDURE — 74011000250 HC RX REV CODE- 250: Performed by: HOSPITALIST

## 2022-04-27 PROCEDURE — 94761 N-INVAS EAR/PLS OXIMETRY MLT: CPT

## 2022-04-27 PROCEDURE — 85027 COMPLETE CBC AUTOMATED: CPT

## 2022-04-27 RX ORDER — PREDNISONE 10 MG/1
TABLET ORAL
Qty: 20 TABLET | Refills: 0 | Status: SHIPPED | OUTPATIENT
Start: 2022-04-27 | End: 2022-05-17

## 2022-04-27 RX ORDER — AMMONIUM LACTATE 12 G/100G
LOTION TOPICAL 2 TIMES DAILY
Status: DISCONTINUED | OUTPATIENT
Start: 2022-04-27 | End: 2022-04-28 | Stop reason: HOSPADM

## 2022-04-27 RX ORDER — ACETAZOLAMIDE 250 MG/1
500 TABLET ORAL ONCE
Status: COMPLETED | OUTPATIENT
Start: 2022-04-27 | End: 2022-04-27

## 2022-04-27 RX ORDER — METOPROLOL TARTRATE 25 MG/1
25 TABLET, FILM COATED ORAL 2 TIMES DAILY
Status: DISCONTINUED | OUTPATIENT
Start: 2022-04-27 | End: 2022-04-28 | Stop reason: HOSPADM

## 2022-04-27 RX ADMIN — OXYCODONE AND ACETAMINOPHEN 2 TABLET: 5; 325 TABLET ORAL at 04:30

## 2022-04-27 RX ADMIN — Medication 5 UNITS: at 16:04

## 2022-04-27 RX ADMIN — METOPROLOL TARTRATE 25 MG: 25 TABLET, FILM COATED ORAL at 08:46

## 2022-04-27 RX ADMIN — Medication: at 21:00

## 2022-04-27 RX ADMIN — ARFORMOTEROL TARTRATE: 15 SOLUTION RESPIRATORY (INHALATION) at 08:54

## 2022-04-27 RX ADMIN — CASTOR OIL AND BALSAM, PERU: 788; 87 OINTMENT TOPICAL at 08:45

## 2022-04-27 RX ADMIN — ASPIRIN 81 MG: 81 TABLET, CHEWABLE ORAL at 08:46

## 2022-04-27 RX ADMIN — Medication: at 08:44

## 2022-04-27 RX ADMIN — SODIUM CHLORIDE, PRESERVATIVE FREE 10 ML: 5 INJECTION INTRAVENOUS at 12:25

## 2022-04-27 RX ADMIN — FUROSEMIDE 40 MG: 40 TABLET ORAL at 08:46

## 2022-04-27 RX ADMIN — MICONAZOLE NITRATE: 20 CREAM TOPICAL at 21:00

## 2022-04-27 RX ADMIN — MICONAZOLE NITRATE: 20 CREAM TOPICAL at 08:46

## 2022-04-27 RX ADMIN — Medication 2 UNITS: at 12:25

## 2022-04-27 RX ADMIN — CASTOR OIL AND BALSAM, PERU: 788; 87 OINTMENT TOPICAL at 21:00

## 2022-04-27 RX ADMIN — SODIUM CHLORIDE, PRESERVATIVE FREE 10 ML: 5 INJECTION INTRAVENOUS at 05:01

## 2022-04-27 RX ADMIN — PREDNISONE 40 MG: 20 TABLET ORAL at 08:46

## 2022-04-27 RX ADMIN — ENOXAPARIN SODIUM 30 MG: 100 INJECTION SUBCUTANEOUS at 08:45

## 2022-04-27 RX ADMIN — ACETAZOLAMIDE 500 MG: 250 TABLET ORAL at 15:00

## 2022-04-27 NOTE — PROGRESS NOTES
Attempted to schedule hospital follow up PCP appointment. Unable to reach anyone, left voicemail. Pending patient discharge.  Charan Kaufman, Care Management Assistant

## 2022-04-27 NOTE — DISCHARGE SUMMARY
Discharge Summary      Name: Ashley Pena  [id number]  YOB: 1960 (Age: 57 y.o.)   Date of Admission: 4/21/2022  Date of Discharge: 4/27/2022  Attending Physician: Esdras Leone MD    Discharge Diagnosis:   Acute on chronic hypoxic respiratory failure  Acute congestive heart failure exacerbation  Acute COPD exacerbation   Metabolic alkalosis due to diuretics and also compensation from respiratory acidosis   Abnormal UA  Left buttock wounds   H/O P A. Fib  Steroid induced hyperglycemia  Hypertension    Morbid obesity/Impaired mobility    Consultations:  IP CONSULT TO CARDIOLOGY  IP CONSULT TO HOSPITALIST  IP CONSULT TO GENERAL SURGERY  IP CONSULT TO PULMONOLOGY      Brief Admission History/Reason for Admission Per Dirk Younger MD:   Kandi Dyer a 57 y. o. female, with past medical history of chronic hypoxic and hypercapnic respiratory failure with suspected COPD on 4 L nasal cannula at baseline, probable JOSLYN and obesity hypoventilation syndrome on CPAP at night, morbid obesity, refractory A. fib, CHF with normal ejection fraction on Lasix at home 40 mg daily, DM2, HTN, and HLD  who presents via  EMS to the ED with c/o shortness of breath.  EMS reported patient was satting at 88% on her home oxygen (3 L.)  Reports having rapid progression of shortness of breath with chest tightness since earlier in the evening. Per report patient is bedbound for some time now. Per EMS report patient home was in disarray and patient may need to PT OT and  consult to assess home situation. Patient denies fever chills productive cough. No COVID symptoms. Denies sick contact. Brief Hospital Course by Main Problems:   Acute on chronic hypoxic respiratory failure  Acute congestive heart failure exacerbation  Acute COPD exacerbation  Pt is currently stable on 3-4LNC which is her baseline requirement.   She was treated with diuretics, IV solumedrol and doxycycline which sh ecompleted inpt. Pt will need to cont' PO lasix and tapered dose prednisone at discharge.      Metabolic alkalosis due to diuretics and also compensation from respiratory acidosis  S/p Diamox. Repeat labs outpt. Alkalosis improve      Abnormal UA  UA is abnormal.  Urine culture showed mixed growth and recommended to recollect. Urine culture was sent twice but both times it was contaminated. Patient currently does not report any dysuria urgency.     Left buttock wounds: Continue wound care.     H/O P A. Fib  Per patient she is not on any blood thinner and not taking aspirin because she getting nosebleeds with aspirin. Multiple prior cardioversion procedures  Continue metoprolol. Continue aspirin     Steroid induced hyperglycemia  Does not appear to be on medication at home. A1c level 5.4 on 4/21     Hypertension controlled. Cont' PTA meds.       Morbid obesity/Impaired mobility, reportedly mostly bedbound at home       Discharge Exam:  Patient seen and examined by me on discharge day. Pertinent Findings:  Visit Vitals  BP (!) 131/54   Pulse 73   Temp 97.2 °F (36.2 °C)   Resp 20   Ht 5' 3\" (1.6 m)   Wt 134.7 kg (297 lb)   SpO2 98%   BMI 52.61 kg/m²     Gen:    Not in distress  Chest: Clear lungs  CVS:   Regular rhythm.   No edema  Abd:  Soft, not distended, not tender    Discharge/Recent Laboratory Results:  Recent Labs     04/27/22 0422      K 3.8   CL 96*   CO2 41*   BUN 39*   *   CA 9.3     Recent Labs     04/27/22 0422   HGB 11.6   HCT 40.0   WBC 17.2*          Discharge Medications:  Current Discharge Medication List      START taking these medications    Details   predniSONE (DELTASONE) 10 mg tablet 4 tabs daily for 2 days, then 3 tabs daily for 2 days , then 2 tabs daily for 2 days, then 1 tab daily for 2 days  Qty: 20 Tablet, Refills: 0  Start date: 4/27/2022         CONTINUE these medications which have NOT CHANGED    Details   albuterol-ipratropium (DUO-NEB) 2.5 mg-0.5 mg/3 ml nebu 3 mL by Nebulization route every four (4) hours as needed for Wheezing, Shortness of Breath or Respiratory Distress. Qty: 100 Nebule, Refills: 0      acetaminophen (TYLENOL) 325 mg tablet Take 650 mg by mouth every six (6) hours as needed for Pain. furosemide (LASIX) 40 mg tablet Take 1 Tab by mouth daily. Qty: 30 Tab, Refills: 0      atorvastatin (LIPITOR) 10 mg tablet Take 10 mg by mouth nightly. metoprolol (LOPRESSOR) 25 mg tablet Take 25 mg by mouth two (2) times a day.                  DISPOSITION:    Home with Family: x   Home with HH/PT/OT/RN:    SNF/LTC:    JUAN M:    OTHER:          Follow up with:   PCP : Unknown, Provider, MD  Follow-up Information     Follow up With Specialties Details Why Contact Info    Unknown, Provider, MD Internal Medicine In 3 days  Patient not available to ask              Total time in minutes spent coordinating this discharge (includes going over instructions, follow-up, prescriptions, and preparing report for sign off to her PCP) :  35 minutes

## 2022-04-27 NOTE — PROGRESS NOTES
No further needs identified at this time. Patient is ready to d/c on a CM standpoint. RN aware. [facility] at 3:15 PM    Transition of Care Plan:     RUR: 14%  Disposition: Home with family assistance and HH (SN)  Follow up appointments: PCP, specialists as indicated   DME needed: needs mattress for hospital bed   Transportation at Discharge: BL[facility] via [facility] at 3:15 PM  Keys or means to access home:  yes       Medicare Letter: Received on 4/27/22  Is patient a BCPI-A Bundle: No                    If yes, was Bundle Letter given?:    Is patient a  and connected with the [employer]   If yes, was [employer] transfer form completed and [facility] notified? Caregiver Contact: Edda Root (daughter) [phone]  Discharge Caregiver contacted prior to [address] needed?: no    2:54 PM  [facility]'s [facility] careHH referral sent to [place] [id number] for SN wound care. 11:28 AM  CM acknowledged d/c orders. Pt will d/c home with family assistance. CM contacted pt's daughter and she is in agreement with d/c plan. She declined HH (PT/OT) set as she acknowledges that pt's baseline is bed bound. [facility] transport set for 3:15 PM. 2nd IMM was delivered, see scanned copy. PCS form placed in bedside chart. Care Management Interventions  PCP Verified by CM:  Yes  Palliative Care Criteria Met (RRAT>21 & CHF Dx)?: No  Mode of Transport at Discharge: [facility] and Home Transport Time of Discharge: [address] (CM Consult): Discharge Planning  Discharge Durable Medical Equipment: No  Physical Therapy Consult: Yes  Occupational Therapy Consult: Yes  Speech Therapy Consult: No  Support Systems: Child(dejuan)  Confirm Follow Up Transport: Family  The Patient and/or Patient Representative was Provided with a Choice of Provider and Agrees with the Discharge Plan?: Yes  Name of the Patient Representative Who was Provided with a Choice of Provider and Agrees with the Discharge Plan: linda Simmons)  Freedom of Choice List was Provided with Basic Dialogue that Supports the Patient's Individualized Plan of Care/Goals, Treatment Preferences and Shares the Quality Data Associated with the Providers?: No  [place] Resource Information Provided?: No  Discharge Location  Patient Expects to be Discharged to[de-identified] Home with family assistance    DOROTHY Cedillo  Care Manager [facility]  [phone]

## 2022-04-27 NOTE — PROGRESS NOTES
[facility] came to  patient. Informed writer that patient will not fit on their stretcher. And that a bariatric team will come get patient at 0400.   Bedside report given to [facility], RN Notified patient's sister and she voiced that 0400 is not a good time and to reschedule for AM .

## 2022-04-28 VITALS
WEIGHT: 293 LBS | BODY MASS INDEX: 51.91 KG/M2 | TEMPERATURE: 98.5 F | SYSTOLIC BLOOD PRESSURE: 107 MMHG | HEIGHT: 63 IN | DIASTOLIC BLOOD PRESSURE: 52 MMHG | RESPIRATION RATE: 20 BRPM | HEART RATE: 81 BPM | OXYGEN SATURATION: 94 %

## 2022-04-28 LAB
GLUCOSE BLD STRIP.AUTO-MCNC: 137 MG/DL (ref 65–117)
GLUCOSE BLD STRIP.AUTO-MCNC: 150 MG/DL (ref 65–117)
SERVICE CMNT-IMP: ABNORMAL
SERVICE CMNT-IMP: ABNORMAL

## 2022-04-28 PROCEDURE — 94640 AIRWAY INHALATION TREATMENT: CPT

## 2022-04-28 PROCEDURE — 74011250637 HC RX REV CODE- 250/637: Performed by: INTERNAL MEDICINE

## 2022-04-28 PROCEDURE — 74011000250 HC RX REV CODE- 250: Performed by: HOSPITALIST

## 2022-04-28 PROCEDURE — 77010033678 HC OXYGEN DAILY

## 2022-04-28 PROCEDURE — 94660 CPAP INITIATION&MGMT: CPT

## 2022-04-28 PROCEDURE — 74011636637 HC RX REV CODE- 636/637: Performed by: INTERNAL MEDICINE

## 2022-04-28 PROCEDURE — 2709999900 HC NON-CHARGEABLE SUPPLY

## 2022-04-28 PROCEDURE — 74011250636 HC RX REV CODE- 250/636: Performed by: INTERNAL MEDICINE

## 2022-04-28 PROCEDURE — 74011250637 HC RX REV CODE- 250/637: Performed by: HOSPITALIST

## 2022-04-28 PROCEDURE — 82962 GLUCOSE BLOOD TEST: CPT

## 2022-04-28 PROCEDURE — 77030013575 HC DRSG HYDROFERA HOLL -B

## 2022-04-28 RX ADMIN — FUROSEMIDE 40 MG: 40 TABLET ORAL at 09:56

## 2022-04-28 RX ADMIN — MICONAZOLE NITRATE: 20 CREAM TOPICAL at 09:58

## 2022-04-28 RX ADMIN — ARFORMOTEROL TARTRATE: 15 SOLUTION RESPIRATORY (INHALATION) at 08:55

## 2022-04-28 RX ADMIN — OXYCODONE AND ACETAMINOPHEN 2 TABLET: 5; 325 TABLET ORAL at 11:37

## 2022-04-28 RX ADMIN — METOPROLOL TARTRATE 25 MG: 25 TABLET, FILM COATED ORAL at 00:05

## 2022-04-28 RX ADMIN — ASPIRIN 81 MG: 81 TABLET, CHEWABLE ORAL at 09:56

## 2022-04-28 RX ADMIN — ENOXAPARIN SODIUM 30 MG: 100 INJECTION SUBCUTANEOUS at 09:56

## 2022-04-28 RX ADMIN — Medication: at 09:58

## 2022-04-28 RX ADMIN — ATORVASTATIN CALCIUM 10 MG: 10 TABLET, FILM COATED ORAL at 00:04

## 2022-04-28 RX ADMIN — PREDNISONE 40 MG: 20 TABLET ORAL at 09:56

## 2022-04-28 RX ADMIN — OXYCODONE AND ACETAMINOPHEN 2 TABLET: 5; 325 TABLET ORAL at 00:05

## 2022-04-28 RX ADMIN — ENOXAPARIN SODIUM 30 MG: 100 INJECTION SUBCUTANEOUS at 00:04

## 2022-04-28 RX ADMIN — METOPROLOL TARTRATE 25 MG: 25 TABLET, FILM COATED ORAL at 09:56

## 2022-04-28 NOTE — PROGRESS NOTES
Spoke with patients linda Gibson she awaits patients arrival.  Verbally reviewed discharge with linda Gibson and patient  Wound care completed with Wound care  Nurse,  Pt en route home  No personal belongings remain in the patients room

## 2022-04-28 NOTE — PROGRESS NOTES
[facility] here to pick patient up, called daughter multiple times to ensure daughter will be home but no response.  rescheduled for tomorrow morning.  Will follow up with in the am.

## 2022-04-28 NOTE — WOUND CARE
Wound care follow up for the Left Buttocks wound and sacrum:  Chart reviewed and patient assessed. Pt. Has been in a [facility] Bariatric Bed with a [place] sheet. Apparently the patient has a new hospital bed at home and home care will be taking care of her wounds. Assessment: the pt. Was turned and the dressing was saturated with serosanguinous drainage. The documentation was updated in the EMR. The left buttock wound bed is granulating but also bleeding. The skin around the wound looks better than upon admission - much less inflamed, red. The sacrum wound is now starting to break open / Evolve. Treatment: the wounds were cleansed with NS and wiped with gauze and direct pressure applied to stop the bleeding. Packed with Hydrofera Blue and then a small sacrum foam dressing. Plan: Home Care to be provided by [facility]. Pt.'s daughter, Jon Doshi will be the main caregiver for the patient. Several days of wound supplied given to the patient.    Steve Simon RN, BSN, [facility]

## 2022-04-28 NOTE — PROGRESS NOTES
No further needs identified at this time. Patient is ready to d/c on a CM standpoint. RN aware.     [facility] at 11:15 AM     Transition of Care Plan:     RUR: 14%  Disposition: Home with family assistance and HH (SN) [facility]  Follow up appointments: PCP, specialists as indicated   DME needed: needs mattress for hospital bed   Transportation at Discharge: BLS via [facility] at 3:15 PM  Keys or means to access home:  yes       Medicare Letter: Received on 4/27/22  Is patient a BCPI-A Bundle: No                    If yes, was Bundle Letter given?:    Is patient a [place] and connected with the [employer]  If yes, was [employer] transfer form completed and [facility] notified? Caregiver Contact: Edda Bowles (daughter) [phone]  Discharge Caregiver contacted prior to [address] needed?: no    CM reviewed pt's chart. [facility] was unable to transport pt yesterday evening.  New transport time is 11:15 AM.    DOROTHY Melissa  Care Manager [facility]  [phone]

## 2022-05-03 ENCOUNTER — APPOINTMENT (OUTPATIENT)
Dept: MRI IMAGING | Age: 62
DRG: 477 | End: 2022-05-03
Attending: STUDENT IN AN ORGANIZED HEALTH CARE EDUCATION/TRAINING PROGRAM
Payer: MEDICARE

## 2022-05-03 ENCOUNTER — HOSPITAL ENCOUNTER (INPATIENT)
Age: 62
LOS: 13 days | Discharge: NURSING, REHAB OR LONG-TERM CARE | DRG: 477 | End: 2022-05-17
Attending: STUDENT IN AN ORGANIZED HEALTH CARE EDUCATION/TRAINING PROGRAM | Admitting: INTERNAL MEDICINE
Payer: MEDICARE

## 2022-05-03 ENCOUNTER — APPOINTMENT (OUTPATIENT)
Dept: CT IMAGING | Age: 62
DRG: 477 | End: 2022-05-03
Attending: STUDENT IN AN ORGANIZED HEALTH CARE EDUCATION/TRAINING PROGRAM
Payer: MEDICARE

## 2022-05-03 ENCOUNTER — APPOINTMENT (OUTPATIENT)
Dept: GENERAL RADIOLOGY | Age: 62
DRG: 477 | End: 2022-05-03
Attending: STUDENT IN AN ORGANIZED HEALTH CARE EDUCATION/TRAINING PROGRAM
Payer: MEDICARE

## 2022-05-03 DIAGNOSIS — Z71.89 COUNSELING REGARDING ADVANCE CARE PLANNING AND GOALS OF CARE: ICD-10-CM

## 2022-05-03 DIAGNOSIS — S72.002K CLOSED FRACTURE OF LEFT HIP WITH NONUNION, SUBSEQUENT ENCOUNTER: ICD-10-CM

## 2022-05-03 DIAGNOSIS — A41.9 SEVERE SEPSIS (HCC): Primary | ICD-10-CM

## 2022-05-03 DIAGNOSIS — K52.9 ENTERITIS: ICD-10-CM

## 2022-05-03 DIAGNOSIS — R19.7 DIARRHEA OF PRESUMED INFECTIOUS ORIGIN: ICD-10-CM

## 2022-05-03 DIAGNOSIS — M86.9: ICD-10-CM

## 2022-05-03 DIAGNOSIS — L89.329 PRESSURE INJURY OF SKIN OF LEFT BUTTOCK, UNSPECIFIED INJURY STAGE: ICD-10-CM

## 2022-05-03 DIAGNOSIS — G89.4 CHRONIC PAIN SYNDROME: ICD-10-CM

## 2022-05-03 DIAGNOSIS — M25.552 PAIN IN LEFT HIP: ICD-10-CM

## 2022-05-03 DIAGNOSIS — R65.20 SEVERE SEPSIS (HCC): Primary | ICD-10-CM

## 2022-05-03 DIAGNOSIS — Z74.01 BEDBOUND: ICD-10-CM

## 2022-05-03 DIAGNOSIS — E66.01 MORBID OBESITY WITH BMI OF 50.0-59.9, ADULT (HCC): ICD-10-CM

## 2022-05-03 DIAGNOSIS — E66.01 MORBID OBESITY WITH BMI OF 45.0-49.9, ADULT (HCC): ICD-10-CM

## 2022-05-03 DIAGNOSIS — N30.00 ACUTE CYSTITIS WITHOUT HEMATURIA: ICD-10-CM

## 2022-05-03 DIAGNOSIS — M86.10 ACUTE OSTEOMYELITIS (HCC): ICD-10-CM

## 2022-05-03 DIAGNOSIS — M86.252 SUBACUTE OSTEOMYELITIS OF LEFT FEMUR (HCC): ICD-10-CM

## 2022-05-03 DIAGNOSIS — M54.30 SCIATICA, UNSPECIFIED LATERALITY: ICD-10-CM

## 2022-05-03 DIAGNOSIS — I48.0 PAROXYSMAL ATRIAL FIBRILLATION (HCC): ICD-10-CM

## 2022-05-03 DIAGNOSIS — S72.002A CLOSED HIP FRACTURE, LEFT, INITIAL ENCOUNTER (HCC): ICD-10-CM

## 2022-05-03 LAB
ALBUMIN SERPL-MCNC: 2.5 G/DL (ref 3.5–5)
ALBUMIN/GLOB SERPL: 0.6 {RATIO} (ref 1.1–2.2)
ALP SERPL-CCNC: 268 U/L (ref 45–117)
ALT SERPL-CCNC: 52 U/L (ref 12–78)
ANION GAP SERPL CALC-SCNC: 1 MMOL/L (ref 5–15)
APPEARANCE UR: ABNORMAL
AST SERPL-CCNC: 68 U/L (ref 15–37)
BACTERIA URNS QL MICRO: ABNORMAL /HPF
BASOPHILS # BLD: 0 K/UL (ref 0–0.1)
BASOPHILS NFR BLD: 0 % (ref 0–1)
BILIRUB SERPL-MCNC: 1 MG/DL (ref 0.2–1)
BILIRUB UR QL: NEGATIVE
BUN SERPL-MCNC: 22 MG/DL (ref 6–20)
BUN/CREAT SERPL: 32 (ref 12–20)
CALCIUM SERPL-MCNC: 8.8 MG/DL (ref 8.5–10.1)
CAOX CRY URNS QL MICRO: ABNORMAL
CHLORIDE SERPL-SCNC: 97 MMOL/L (ref 97–108)
CO2 SERPL-SCNC: 38 MMOL/L (ref 21–32)
COLOR UR: ABNORMAL
CREAT SERPL-MCNC: 0.69 MG/DL (ref 0.55–1.02)
CRP SERPL HS-MCNC: >9.5 MG/L
DIFFERENTIAL METHOD BLD: ABNORMAL
EOSINOPHIL # BLD: 0.1 K/UL (ref 0–0.4)
EOSINOPHIL NFR BLD: 1 % (ref 0–7)
EPITH CASTS URNS QL MICRO: ABNORMAL /LPF
ERYTHROCYTE [DISTWIDTH] IN BLOOD BY AUTOMATED COUNT: 16.3 % (ref 11.5–14.5)
ERYTHROCYTE [SEDIMENTATION RATE] IN BLOOD: 26 MM/HR (ref 0–30)
GLOBULIN SER CALC-MCNC: 3.9 G/DL (ref 2–4)
GLUCOSE SERPL-MCNC: 234 MG/DL (ref 65–100)
GLUCOSE UR STRIP.AUTO-MCNC: NEGATIVE MG/DL
HCT VFR BLD AUTO: 39.8 % (ref 35–47)
HGB BLD-MCNC: 12.2 G/DL (ref 11.5–16)
HGB UR QL STRIP: ABNORMAL
IMM GRANULOCYTES # BLD AUTO: 0.1 K/UL (ref 0–0.04)
IMM GRANULOCYTES NFR BLD AUTO: 1 % (ref 0–0.5)
KETONES UR QL STRIP.AUTO: NEGATIVE MG/DL
LACTATE BLD-SCNC: 1.52 MMOL/L (ref 0.4–2)
LACTATE BLD-SCNC: 3.69 MMOL/L (ref 0.4–2)
LEUKOCYTE ESTERASE UR QL STRIP.AUTO: ABNORMAL
LYMPHOCYTES # BLD: 0.9 K/UL (ref 0.8–3.5)
LYMPHOCYTES NFR BLD: 6 % (ref 12–49)
MCH RBC QN AUTO: 31.4 PG (ref 26–34)
MCHC RBC AUTO-ENTMCNC: 30.7 G/DL (ref 30–36.5)
MCV RBC AUTO: 102.6 FL (ref 80–99)
MONOCYTES # BLD: 0.9 K/UL (ref 0–1)
MONOCYTES NFR BLD: 5 % (ref 5–13)
NEUTS SEG # BLD: 14.3 K/UL (ref 1.8–8)
NEUTS SEG NFR BLD: 87 % (ref 32–75)
NITRITE UR QL STRIP.AUTO: POSITIVE
NRBC # BLD: 0 K/UL (ref 0–0.01)
NRBC BLD-RTO: 0 PER 100 WBC
PH UR STRIP: 7 [PH] (ref 5–8)
PLATELET # BLD AUTO: 138 K/UL (ref 150–400)
PMV BLD AUTO: 12.3 FL (ref 8.9–12.9)
POTASSIUM SERPL-SCNC: 3.6 MMOL/L (ref 3.5–5.1)
PROT SERPL-MCNC: 6.4 G/DL (ref 6.4–8.2)
PROT UR STRIP-MCNC: 30 MG/DL
RBC # BLD AUTO: 3.88 M/UL (ref 3.8–5.2)
RBC #/AREA URNS HPF: ABNORMAL /HPF (ref 0–5)
SODIUM SERPL-SCNC: 136 MMOL/L (ref 136–145)
SP GR UR REFRACTOMETRY: 1.01 (ref 1–1.03)
UA: UC IF INDICATED,UAUC: ABNORMAL
UROBILINOGEN UR QL STRIP.AUTO: 1 EU/DL (ref 0.2–1)
WBC # BLD AUTO: 16.4 K/UL (ref 3.6–11)
WBC URNS QL MICRO: ABNORMAL /HPF (ref 0–4)

## 2022-05-03 PROCEDURE — 72197 MRI PELVIS W/O & W/DYE: CPT

## 2022-05-03 PROCEDURE — 96367 TX/PROPH/DG ADDL SEQ IV INF: CPT

## 2022-05-03 PROCEDURE — 74011250637 HC RX REV CODE- 250/637: Performed by: STUDENT IN AN ORGANIZED HEALTH CARE EDUCATION/TRAINING PROGRAM

## 2022-05-03 PROCEDURE — 96365 THER/PROPH/DIAG IV INF INIT: CPT

## 2022-05-03 PROCEDURE — 80053 COMPREHEN METABOLIC PANEL: CPT

## 2022-05-03 PROCEDURE — 96375 TX/PRO/DX INJ NEW DRUG ADDON: CPT

## 2022-05-03 PROCEDURE — 85652 RBC SED RATE AUTOMATED: CPT

## 2022-05-03 PROCEDURE — 81001 URINALYSIS AUTO W/SCOPE: CPT

## 2022-05-03 PROCEDURE — A9576 INJ PROHANCE MULTIPACK: HCPCS | Performed by: STUDENT IN AN ORGANIZED HEALTH CARE EDUCATION/TRAINING PROGRAM

## 2022-05-03 PROCEDURE — 96366 THER/PROPH/DIAG IV INF ADDON: CPT

## 2022-05-03 PROCEDURE — 74011250636 HC RX REV CODE- 250/636: Performed by: STUDENT IN AN ORGANIZED HEALTH CARE EDUCATION/TRAINING PROGRAM

## 2022-05-03 PROCEDURE — 36415 COLL VENOUS BLD VENIPUNCTURE: CPT

## 2022-05-03 PROCEDURE — 87040 BLOOD CULTURE FOR BACTERIA: CPT

## 2022-05-03 PROCEDURE — 83605 ASSAY OF LACTIC ACID: CPT

## 2022-05-03 PROCEDURE — 74011000258 HC RX REV CODE- 258: Performed by: STUDENT IN AN ORGANIZED HEALTH CARE EDUCATION/TRAINING PROGRAM

## 2022-05-03 PROCEDURE — 85025 COMPLETE CBC W/AUTO DIFF WBC: CPT

## 2022-05-03 PROCEDURE — 71045 X-RAY EXAM CHEST 1 VIEW: CPT

## 2022-05-03 PROCEDURE — 74177 CT ABD & PELVIS W/CONTRAST: CPT

## 2022-05-03 PROCEDURE — 86141 C-REACTIVE PROTEIN HS: CPT

## 2022-05-03 PROCEDURE — 99285 EMERGENCY DEPT VISIT HI MDM: CPT

## 2022-05-03 PROCEDURE — 74011000636 HC RX REV CODE- 636: Performed by: STUDENT IN AN ORGANIZED HEALTH CARE EDUCATION/TRAINING PROGRAM

## 2022-05-03 PROCEDURE — 87086 URINE CULTURE/COLONY COUNT: CPT

## 2022-05-03 PROCEDURE — 93005 ELECTROCARDIOGRAM TRACING: CPT

## 2022-05-03 RX ORDER — ONDANSETRON 2 MG/ML
4 INJECTION INTRAMUSCULAR; INTRAVENOUS
Status: DISCONTINUED | OUTPATIENT
Start: 2022-05-03 | End: 2022-05-04

## 2022-05-03 RX ORDER — FENTANYL CITRATE 50 UG/ML
50 INJECTION, SOLUTION INTRAMUSCULAR; INTRAVENOUS ONCE
Status: COMPLETED | OUTPATIENT
Start: 2022-05-03 | End: 2022-05-03

## 2022-05-03 RX ORDER — ACETAMINOPHEN 325 MG/1
650 TABLET ORAL
Status: DISCONTINUED | OUTPATIENT
Start: 2022-05-03 | End: 2022-05-04 | Stop reason: SDUPTHER

## 2022-05-03 RX ORDER — TRAMADOL HYDROCHLORIDE 50 MG/1
50 TABLET ORAL
Status: COMPLETED | OUTPATIENT
Start: 2022-05-03 | End: 2022-05-03

## 2022-05-03 RX ADMIN — GADOTERIDOL 20 ML: 279.3 INJECTION, SOLUTION INTRAVENOUS at 21:39

## 2022-05-03 RX ADMIN — TRAMADOL HYDROCHLORIDE 50 MG: 50 TABLET, COATED ORAL at 17:11

## 2022-05-03 RX ADMIN — SODIUM CHLORIDE 500 ML: 9 INJECTION, SOLUTION INTRAVENOUS at 18:43

## 2022-05-03 RX ADMIN — PIPERACILLIN AND TAZOBACTAM 3.38 G: 3; .375 INJECTION, POWDER, LYOPHILIZED, FOR SOLUTION INTRAVENOUS at 19:36

## 2022-05-03 RX ADMIN — VANCOMYCIN HYDROCHLORIDE 2500 MG: 10 INJECTION, POWDER, LYOPHILIZED, FOR SOLUTION INTRAVENOUS at 20:11

## 2022-05-03 RX ADMIN — FENTANYL CITRATE 50 MCG: 50 INJECTION, SOLUTION INTRAMUSCULAR; INTRAVENOUS at 19:29

## 2022-05-03 RX ADMIN — IOPAMIDOL 100 ML: 755 INJECTION, SOLUTION INTRAVENOUS at 17:32

## 2022-05-03 NOTE — ED PROVIDER NOTES
EMERGENCY DEPARTMENT HISTORY AND PHYSICAL EXAM      Date: 5/3/2022  Patient Name: Rigo Villafuerte    History of Presenting Illness     Chief Complaint   Patient presents with    Wound Check     Per EMS pt presents from home with bed sores, home health nurse advising her to be sent over. Per EMS APS will be involved and family is no longer able to care for her at home. History Provided By: Patient, EMS    HPI: Rigo Villafuerte, 57 y.o. female with past medical history of type, chronic respiratory failure on 4 L of O2, COPD, CHF, morbid obesity, chronic pain, nonambulatory at baseline, presents to the ED with cc of wellness check, decubitus wounds, concern for safety of care at home. Patient was recently discharged from the hospital on April 27 after treatment for CHF exacerbation. Patient has history of known decubitus ulcers, that has been followed up by home health nurse/wound care. Today, home health nurse came to her residence to check on her, and noted several more superficial decubitus ulcers that require further evaluation. Home health nurse was also extremely concerned about her living conditions. EMS reports patient's residence had massive amounts of clutter, difficult for them to maneuver through the house/room. Reportedly there were dead animals in the house? Patient reports she is normally cared for by her daughter, but admits she no longer thinks her daughter can manage her care- \"it's too much for her. \" Patient reports since discharge from the hospital, she has been having daily watery diarrhea. Denies any associated abdominal pain. No N/V. Patient denies any F/C. No urinary complaints. Reports due to ongoing watery diarrhea, she has had wounds over her back that keeps opening up from needing to be wiped after diarrhea episodes. Patient denies prior history of C.diff. She has been on several courses of antibiotics recently.      PCP: Unknown, Provider, MD    No current facility-administered medications on file prior to encounter. Current Outpatient Medications on File Prior to Encounter   Medication Sig Dispense Refill    predniSONE (DELTASONE) 10 mg tablet 4 tabs daily for 2 days, then 3 tabs daily for 2 days , then 2 tabs daily for 2 days, then 1 tab daily for 2 days 20 Tablet 0    albuterol-ipratropium (DUO-NEB) 2.5 mg-0.5 mg/3 ml nebu 3 mL by Nebulization route every four (4) hours as needed for Wheezing, Shortness of Breath or Respiratory Distress. 100 Nebule 0    acetaminophen (TYLENOL) 325 mg tablet Take 650 mg by mouth every six (6) hours as needed for Pain.  furosemide (LASIX) 40 mg tablet Take 1 Tab by mouth daily. (Patient taking differently: Take 20 mg by mouth daily. Takes prn when fluid overloaded) 30 Tab 0    atorvastatin (LIPITOR) 10 mg tablet Take 10 mg by mouth nightly.  metoprolol (LOPRESSOR) 25 mg tablet Take 25 mg by mouth two (2) times a day.          Past History     Past Medical History:  Past Medical History:   Diagnosis Date    Acute on chronic respiratory failure with hypoxia and hypercapnia ([address] 11/20/2020    Atrial fibrillation ([facility] 11/4/2020    CHF (congestive heart failure) (HCC)     Diabetes ([facility]     Hypertension     Ill-defined condition     high cholesterol    Ill-defined condition     tachycardia    JOSLYN (obstructive sleep apnea) 11/20/2020    Other and unspecified symptoms and signs involving general sensations and perceptions     ruptured disc    S/P cardiac cath 11/4/2020    11/3/2020 nonobstructive disease       Past Surgical History:  Past Surgical History:   Procedure Laterality Date    MA COMPRE ELECTROPHYSIOL XM W/LEFT ATRIAL PACNG/REC N/A 11/23/2020    Lt Atrial Pace & Record During Ep Study performed by Anahi Spangler MD at hospitals CARDIAC CATH LAB    MA COMPRE EP EVAL ABLTJ 3D MAPG TX SVT N/A 11/23/2020    ABLATION A-FLUTTER performed by Anahi Spangler MD at [facility] CARDIAC CATH LAB    MA ICAR CATHETER ABLATION ARRHYTHMIA ADD ON N/A 11/23/2020    Ablation Svt/Vt Add On performed by Kayla Villalta MD at [facility] CARDIAC CATH LAB    MD INTRACARDIAC ELECTROPHYSIOLOGIC 3D MAPPING N/A 11/23/2020    Ep 3d Mapping performed by Kayla Villalta MD at [facility] CARDIAC CATH LAB    MD STIM/PACING HEART POST IV DRUG INFU N/A 11/23/2020    Drug Stimulation performed by Kayla Villalta MD at [facility] CARDIAC CATH LAB       Family History:  No family history on file. Social History:  Social History     Tobacco Use    Smoking status: Former Smoker    Smokeless tobacco: Never Used   Substance Use Topics    Alcohol use: No    Drug use: No       Allergies: Allergies   Allergen Reactions    Cefazolin Rash    Other Plant, Animal, Environmental Rash     Ivory soap caused rash on hands. Review of Systems   Review of Systems   Constitutional: Negative for chills and fever. HENT: Negative for congestion and rhinorrhea. Eyes: Negative for visual disturbance. Respiratory: Negative for chest tightness and shortness of breath. Cardiovascular: Negative for chest pain and palpitations. Gastrointestinal: Positive for diarrhea. Negative for abdominal pain, nausea and vomiting. Genitourinary: Negative for dysuria, flank pain and hematuria. Musculoskeletal: Negative for back pain and neck pain. Skin: Positive for wound. Negative for rash. Allergic/Immunologic: Negative for immunocompromised state. Neurological: Negative for dizziness, speech difficulty, weakness and headaches. Hematological: Negative for adenopathy. Psychiatric/Behavioral: Negative for dysphoric mood and suicidal ideas. Physical Exam   Physical Exam  Vitals and nursing note reviewed. Constitutional:       General: She is not in acute distress. Appearance: She is morbidly obese. She is not ill-appearing or toxic-appearing. Comments: Chronically ill in appearance   HENT:      Head: Normocephalic and atraumatic.       Nose: Nose normal.      Mouth/Throat:      Mouth: Mucous membranes are moist.   Eyes:      Extraocular Movements: Extraocular movements intact. Pupils: Pupils are equal, round, and reactive to light. Cardiovascular:      Rate and Rhythm: Normal rate and regular rhythm. Pulses: Normal pulses. Pulmonary:      Effort: Pulmonary effort is normal.      Breath sounds: No stridor. No wheezing or rhonchi. Abdominal:      General: Abdomen is flat. There is no distension. Tenderness: There is no abdominal tenderness. Musculoskeletal:      Cervical back: Normal range of motion and neck supple. Left hip: Tenderness and bony tenderness present. Decreased range of motion. Skin:     General: Skin is warm and dry. Neurological:      General: No focal deficit present. Mental Status: She is alert and oriented to person, place, and time. Psychiatric:         Judgment: Judgment normal.       Diagnostic Study Results     Labs -     Recent Results (from the past 24 hour(s))   CBC WITH AUTOMATED DIFF    Collection Time: 05/03/22  4:00 PM   Result Value Ref Range    WBC 16.4 (H) 3.6 - 11.0 K/uL    RBC 3.88 3.80 - 5.20 M/uL    HGB 12.2 11.5 - 16.0 g/dL    HCT 39.8 35.0 - 47.0 %    .6 (H) 80.0 - 99.0 FL    MCH 31.4 26.0 - 34.0 PG    MCHC 30.7 30.0 - 36.5 g/dL    RDW 16.3 (H) 11.5 - 14.5 %    PLATELET 504 (L) 695 - 400 K/uL    MPV 12.3 8.9 - 12.9 FL    NRBC 0.0 0  WBC    ABSOLUTE NRBC 0.00 0.00 - 0.01 K/uL    NEUTROPHILS 87 (H) 32 - 75 %    LYMPHOCYTES 6 (L) 12 - 49 %    MONOCYTES 5 5 - 13 %    EOSINOPHILS 1 0 - 7 %    BASOPHILS 0 0 - 1 %    IMMATURE GRANULOCYTES 1 (H) 0.0 - 0.5 %    ABS. NEUTROPHILS 14.3 (H) 1.8 - 8.0 K/UL    ABS. LYMPHOCYTES 0.9 0.8 - 3.5 K/UL    ABS. MONOCYTES 0.9 0.0 - 1.0 K/UL    ABS. EOSINOPHILS 0.1 0.0 - 0.4 K/UL    ABS. BASOPHILS 0.0 0.0 - 0.1 K/UL    ABS. IMM.  GRANS. 0.1 (H) 0.00 - 0.04 K/UL    DF AUTOMATED     METABOLIC PANEL, COMPREHENSIVE    Collection Time: 05/03/22  4:00 PM   Result Value Ref Range    Sodium 136 136 - 145 mmol/L    Potassium 3.6 3.5 - 5.1 mmol/L    Chloride 97 97 - 108 mmol/L    CO2 38 (H) 21 - 32 mmol/L    Anion gap 1 (L) 5 - 15 mmol/L    Glucose 234 (H) 65 - 100 mg/dL    BUN 22 (H) 6 - 20 MG/DL    Creatinine 0.69 0.55 - 1.02 MG/DL    BUN/Creatinine ratio 32 (H) 12 - 20      GFR est AA >60 >60 ml/min/1.73m2    GFR est non-AA >60 >60 ml/min/1.73m2    Calcium 8.8 8.5 - 10.1 MG/DL    Bilirubin, total 1.0 0.2 - 1.0 MG/DL    ALT (SGPT) 52 12 - 78 U/L    AST (SGOT) 68 (H) 15 - 37 U/L    Alk.  phosphatase 268 (H) 45 - 117 U/L    Protein, total 6.4 6.4 - 8.2 g/dL    Albumin 2.5 (L) 3.5 - 5.0 g/dL    Globulin 3.9 2.0 - 4.0 g/dL    A-G Ratio 0.6 (L) 1.1 - 2.2     EKG, 12 LEAD, INITIAL    Collection Time: 05/03/22  5:09 PM   Result Value Ref Range    Ventricular Rate 85 BPM    Atrial Rate 78 BPM    P-R Interval 152 ms    QRS Duration 82 ms    Q-T Interval 400 ms    QTC Calculation (Bezet) 476 ms    Calculated P Axis 62 degrees    Calculated R Axis 66 degrees    Calculated T Axis 88 degrees    Diagnosis       Sinus rhythm with marked sinus arrhythmia  Nonspecific ST abnormality  When compared with ECG of 21-APR-2022 01:37,  No significant change was found     POC LACTIC ACID    Collection Time: 05/03/22  6:00 PM   Result Value Ref Range    Lactic Acid (POC) 3.69 (HH) 0.40 - 2.00 mmol/L   SED RATE (ESR)    Collection Time: 05/03/22  6:41 PM   Result Value Ref Range    Sed rate, automated 26 0 - 30 mm/hr   CRP, HIGH SENSITIVITY    Collection Time: 05/03/22  6:41 PM   Result Value Ref Range    CRP, High sensitivity >9.5 mg/L   URINALYSIS W/ REFLEX CULTURE    Collection Time: 05/03/22  6:41 PM    Specimen: Urine   Result Value Ref Range    Color DARK YELLOW      Appearance TURBID (A) CLEAR      Specific gravity 1.010 1.003 - 1.030      pH (UA) 7.0 5.0 - 8.0      Protein 30 (A) NEG mg/dL    Glucose Negative NEG mg/dL    Ketone Negative NEG mg/dL    Bilirubin Negative NEG      Blood MODERATE (A) NEG      Urobilinogen 1.0 0.2 - 1.0 EU/dL    Nitrites Positive (A) NEG      Leukocyte Esterase LARGE (A) NEG      WBC 20-50 0 - 4 /hpf    RBC 0-5 0 - 5 /hpf    Epithelial cells FEW FEW /lpf    Bacteria 1+ (A) NEG /hpf    UA:UC IF INDICATED URINE CULTURE ORDERED (A) CNI      CA Oxalate crystals 2+ (A) NEG   POC LACTIC ACID    Collection Time: 05/03/22 10:21 PM   Result Value Ref Range    Lactic Acid (POC) 1.52 0.40 - 2.00 mmol/L       Radiologic Studies -   XR CHEST PORT   Final Result   Improved congestion and pulmonary edema with possible left pleural   effusion. MRI PELV W WO CONT         CT ABD PELV W CONT   Final Result   1. Displaced left femoral neck fracture deformity with aggressive erosive   changes and sclerosis along the fracture margin (879-27). Consider acute   osteomyelitis and/or septic arthritis given the clinical presentation. 2.  Bilateral nonobstructing nephrolithiasis. 3.  Anusha mesentery, nonspecific finding which can be seen with enteritis   etiologies (e.g., mesenteric panniculitis, enteritis, pancreatitis, etc.)           CT Results  (Last 48 hours)               05/03/22 1732  CT ABD PELV W CONT Final result    Impression:  1. Displaced left femoral neck fracture deformity with aggressive erosive   changes and sclerosis along the fracture margin (598-98). Consider acute   osteomyelitis and/or septic arthritis given the clinical presentation. 2.  Bilateral nonobstructing nephrolithiasis. 3.  Anusha mesentery, nonspecific finding which can be seen with enteritis   etiologies (e.g., mesenteric panniculitis, enteritis, pancreatitis, etc.)           Narrative:  EXAM: CT ABD PELV W CONT       INDICATION: multiple decubitus ulcers, eval for associated abscess/osteo        COMPARISON: Abdominal radiograph 6/25/2020        CONTRAST: 100 mL of Isovue-370.        ORAL CONTRAST: None       TECHNIQUE:    Following the uneventful intravenous administration of contrast, thin axial   images were obtained through the abdomen and pelvis. Coronal and sagittal   reconstructions were generated. CT dose reduction was achieved through use of a   standardized protocol tailored for this examination and automatic exposure   control for dose modulation. FINDINGS:    LOWER THORAX: Cardiomegaly. Patchy atelectasis/scarring in the lung bases   LIVER: No mass. BILIARY TREE: Gallbladder is within normal limits. CBD is not dilated. SPLEEN: within normal limits. PANCREAS: Atrophic. No mass or ductal dilatation. ADRENALS: Unremarkable. KIDNEYS:  Several nonobstructing bilateral calculi measuring up to 10 mm in the   right upper pole. No hydronephrosis. No mass. Subcentimeter left upper pole   hypodensity which is too small to accurately characterize; no dedicated   follow-up recommended   STOMACH: Unremarkable. SMALL BOWEL: No dilatation or wall thickening. COLON: No dilatation or wall thickening. APPENDIX: ? Partial appendectomy   PERITONEUM: No ascites or pneumoperitoneum. RETROPERITONEUM: No lymphadenopathy or aortic aneurysm. Atherosclerosis. REPRODUCTIVE ORGANS: Uterus and adnexa are within normal limits   URINARY BLADDER: No mass or calculus. BONES: Displaced left femoral neck fracture deformity with aggressive erosive   changes and sclerosis along the fracture margin (993-39)   ABDOMINAL WALL: No mass or hernia. ADDITIONAL COMMENTS: Anusha mesentery (437-40). CXR Results  (Last 48 hours)               05/03/22 2207  XR CHEST PORT Final result    Impression:  Improved congestion and pulmonary edema with possible left pleural   effusion. Narrative:  EXAM: XR CHEST PORT       INDICATION: eval for edema, infiltrate       COMPARISON: Chest x-ray 4/21/2022 and CT thorax 12/2/2020. FINDINGS: A portable AP radiograph of the chest was obtained at 21:57 hours. The   patient is on a cardiac monitor.  The lungs show interval improvement in congestion and interstitial edema with some persistent hazy opacity of the   bases, though exam is overall compromised by poor penetration of patient   habitus. The cardiac and mediastinal contours and pulmonary vascularity are   normal.  The chest wall structures and visualized upper abdomen show no acute   findings with incidental note of degenerative spine and shoulder changes as well   as diffuse osteopenia. Medical Decision Making   I, Essence Matthews MD am the first provider for this patient, and I am the attending of record for this patient encounter. I reviewed the vital signs, available nursing notes, past medical history, past surgical history, family history and social history. Vital Signs-Reviewed the patient's vital signs. Patient Vitals for the past 24 hrs:   Temp Pulse Resp BP SpO2   05/03/22 2317 -- -- -- (!) 143/64 99 %   05/03/22 2302 -- -- -- (!) 133/43 97 %   05/03/22 2258 -- -- -- (!) 154/61 98 %   05/03/22 1700 -- -- -- (!) 138/49 98 %   05/03/22 1645 -- 87 -- (!) 136/59 99 %   05/03/22 1630 -- -- -- 127/63 97 %   05/03/22 1527 98.2 °F (36.8 °C) 89 20 (!) 151/67 100 %       EKG interpretation: (Preliminary)  Sinus rhythm with pacs. No significant acute ST changes when compared to previous EKG. QTc normal. EKG interpretation by Essence Matthews MD    Records Reviewed: Nursing Notes and Old Medical Records    Provider Notes (Medical Decision Making):   DDx: gastroenteritis, c.diff, dehydration, dean, electrolyte derangements, acute abdominal process, uti, sepsis, infected decubitus wound, osteomyelitis, septic joint, deep tissue abscess etc.    Plan: EKG, CBC, CMP, point-of-care lactic acid, C. difficile, chest x-ray, UA, CT abdomen pelvis. Labs remarkable for leukocytosis of 16.4, however, patient has been on steroids recently-and recent WBC counts have all been elevated above normal.  Lactic acid elevated at 3.69. We will check blood cultures x2.   Hydrate with 500 cc of IVF as patient does have a known history of CHF, and chest x-ray is yet to be completed. CT abdomen pelvis showed displaced left femoral neck fracture deformity with aggressive erosive changes and sclerosis along the fracture margin. Possibly 2/2 to osteomyelitis and/or septic arthritis given the clinical presentation. Anusha mesentery, nonspecific finding that can be seen with enteritis etiologies. Results discussed with the patient- who now tells me she has been having left hip pain for \"months. \" States that she has not been able to straighten her left lower extremity for \"a while. \"  Reports she did have a fall prior to onset of left hip pain, but was never evaluated for this. She admits that the pain in this joint has progressively worsened over over the past several months. Will check CRP, ESR, empirically treat patient with IV vancomycin and Zosyn. Will consult orthopedic surgery and plan on admission. Patient's case reviewed with Dr. Darryle How, who recommended obtaining MRI pelvis w/ contrast to definitively r/o for pelvis involvement as orthopedic surgeons at our facility do not have the capability to take care of infections that also involve the pelvis. In the case of pelvic involvement, patient will require transfer to [facility] for further management. If infection is only localized to the hip joint, however, patient can be managed at our facility. Preliminary pelvis MRI read as fluid in the left femur fracture site and surrounding enhancement and edematous changes within adjacent bone as well as edema within the surrounding hip musculature. Pattern not typical for osteomyelitis, however, it cannot be entirely excluded. No definitive evidence of pelvic bony infection. Patient should be able to be cared for at our facility. Case was discussed with the hospitalist, who has agreed to evaluate for admission. Patient's repeat lactic acid now improved to 1.52 from initial 3.69.     ED Course as of 05/03/22 2324   Tue May 03, 2022   [address] [facility] ED SEPSIS NOTE:     7:34 PM The patient now meets criteria for: Severe Sepsis    Fluid resuscitation with: Patient does not require a 30cc/kg bolus because they do not meet criteria for septic shock (SBP<90 or decreased by >40 mmHG from baseline, MAP<65, Lactate 4 or greater). Due to concern for rapidly advancing infection and deterioration of patient's condition, antibiotics are started STAT and cultures ordered. []   2045 I've performed a sepsis reassessment of the patient's clinical volume status and tissue perfusion at time 8:45 PM    [JM]      ED Course User Index  [JM] Georges Diehl MD     CRITICAL CARE NOTE:    Authorized and Performed by: Joe Steinberg MD    IMPENDING DETERIORATION -Metabolic  ASSOCIATED RISK FACTORS - Hypotension, Shock and Metabolic changes  MANAGEMENT- Bedside Assessment  INTERPRETATION -  Xrays, CT Scan, ECG, Blood Pressure, Cardiac Output Measures, pulse ox, and all labs  INTERVENTIONS - hemodynamic mngmt and Metobolic interventions  CASE REVIEW - Hospitalist/Intensivist, Medical Sub-Specialist and Nursing  TREATMENT RESPONSE -Stable  PERFORMED BY - Self    I have spent 130 minutes of critical care time involved in obtaining a history, examining the patient, lab review, arranging urgent treatment with development of a management plan, consultations with other providers, family decision- making, bedside attention and documentation. During this entire length of time I was immediately available to the patient . Critical Care: The reason for providing this level of medical care for this critically ill patient was due to a critical illness that impaired one or more vital organ systems, such that there was a high probability of imminent or life threatening deterioration in the patient's condition.  This care involved high complexity decision making to assess, manipulate, and support vital system functions, to treat this degree of vital organ system failure, and to prevent further life threatening deterioration of the patients condition. Critical care time was exclusive of separately billable procedures. ED Course:   Initial assessment performed. The patient's presenting problems have been discussed, and they are in agreement with the care plan formulated and outlined with them. I have encouraged them to ask questions as they arise throughout their visit. Vanesa Lopez MD      Disposition:  Admit      Diagnosis     Clinical Impression:   1. Severe sepsis (HCC)    2. Subacute osteomyelitis of left femur ([facility]    3. Closed hip fracture, left, initial encounter ([facility] [facility]    4. Enteritis    5. Acute cystitis without hematuria    6. Bedbound    7. Pressure injury of skin of left buttock, unspecified injury stage        Attestations:    Vanesa Lopez MD    Please note that this dictation was completed with [employer], the computer voice recognition software. Quite often unanticipated grammatical, syntax, homophones, and other interpretive errors are inadvertently transcribed by the computer software. Please disregard these errors. Please excuse any errors that have escaped final proofreading. Thank you.

## 2022-05-04 PROBLEM — M86.9: Status: ACTIVE | Noted: 2022-05-04

## 2022-05-04 LAB
ALBUMIN SERPL-MCNC: 2.4 G/DL (ref 3.5–5)
ALBUMIN/GLOB SERPL: 0.6 {RATIO} (ref 1.1–2.2)
ALP SERPL-CCNC: 290 U/L (ref 45–117)
ALT SERPL-CCNC: 44 U/L (ref 12–78)
ANION GAP SERPL CALC-SCNC: 4 MMOL/L (ref 5–15)
AST SERPL-CCNC: 45 U/L (ref 15–37)
ATRIAL RATE: 151 BPM
ATRIAL RATE: 78 BPM
BILIRUB SERPL-MCNC: 1.2 MG/DL (ref 0.2–1)
BUN SERPL-MCNC: 17 MG/DL (ref 6–20)
BUN/CREAT SERPL: 39 (ref 12–20)
CALCIUM SERPL-MCNC: 8.8 MG/DL (ref 8.5–10.1)
CALCULATED P AXIS, ECG09: 55 DEGREES
CALCULATED P AXIS, ECG09: 62 DEGREES
CALCULATED R AXIS, ECG10: 66 DEGREES
CALCULATED R AXIS, ECG10: 95 DEGREES
CALCULATED T AXIS, ECG11: -172 DEGREES
CALCULATED T AXIS, ECG11: 88 DEGREES
CHLORIDE SERPL-SCNC: 99 MMOL/L (ref 97–108)
CK SERPL-CCNC: 16 U/L (ref 26–192)
CO2 SERPL-SCNC: 35 MMOL/L (ref 21–32)
CREAT SERPL-MCNC: 0.44 MG/DL (ref 0.55–1.02)
CRP SERPL-MCNC: 8.89 MG/DL (ref 0–0.6)
DIAGNOSIS, 93000: NORMAL
DIAGNOSIS, 93000: NORMAL
GLOBULIN SER CALC-MCNC: 3.8 G/DL (ref 2–4)
GLUCOSE SERPL-MCNC: 152 MG/DL (ref 65–100)
LIPASE SERPL-CCNC: 123 U/L (ref 73–393)
MAGNESIUM SERPL-MCNC: 2.2 MG/DL (ref 1.6–2.4)
P-R INTERVAL, ECG05: 152 MS
P-R INTERVAL, ECG05: 206 MS
POTASSIUM SERPL-SCNC: 3.3 MMOL/L (ref 3.5–5.1)
PROCALCITONIN SERPL-MCNC: 1.41 NG/ML
PROT SERPL-MCNC: 6.2 G/DL (ref 6.4–8.2)
Q-T INTERVAL, ECG07: 242 MS
Q-T INTERVAL, ECG07: 400 MS
QRS DURATION, ECG06: 62 MS
QRS DURATION, ECG06: 82 MS
QTC CALCULATION (BEZET), ECG08: 383 MS
QTC CALCULATION (BEZET), ECG08: 476 MS
SODIUM SERPL-SCNC: 138 MMOL/L (ref 136–145)
TROPONIN-HIGH SENSITIVITY: 32 NG/L (ref 0–51)
VENTRICULAR RATE, ECG03: 151 BPM
VENTRICULAR RATE, ECG03: 85 BPM

## 2022-05-04 PROCEDURE — 65270000046 HC RM TELEMETRY

## 2022-05-04 PROCEDURE — 94640 AIRWAY INHALATION TREATMENT: CPT

## 2022-05-04 PROCEDURE — 86140 C-REACTIVE PROTEIN: CPT

## 2022-05-04 PROCEDURE — 83735 ASSAY OF MAGNESIUM: CPT

## 2022-05-04 PROCEDURE — 74011250636 HC RX REV CODE- 250/636: Performed by: INTERNAL MEDICINE

## 2022-05-04 PROCEDURE — 36415 COLL VENOUS BLD VENIPUNCTURE: CPT

## 2022-05-04 PROCEDURE — 77030040392 HC DRSG OPTIFOAM MDII -A

## 2022-05-04 PROCEDURE — 80053 COMPREHEN METABOLIC PANEL: CPT

## 2022-05-04 PROCEDURE — 74011250637 HC RX REV CODE- 250/637: Performed by: INTERNAL MEDICINE

## 2022-05-04 PROCEDURE — 77030038269 HC DRN EXT URIN PURWCK BARD -A

## 2022-05-04 PROCEDURE — 74011000258 HC RX REV CODE- 258: Performed by: NURSE PRACTITIONER

## 2022-05-04 PROCEDURE — 74011250636 HC RX REV CODE- 250/636: Performed by: NURSE PRACTITIONER

## 2022-05-04 PROCEDURE — 82550 ASSAY OF CK (CPK): CPT

## 2022-05-04 PROCEDURE — 84145 PROCALCITONIN (PCT): CPT

## 2022-05-04 PROCEDURE — 84484 ASSAY OF TROPONIN QUANT: CPT

## 2022-05-04 PROCEDURE — 83690 ASSAY OF LIPASE: CPT

## 2022-05-04 PROCEDURE — 74011000250 HC RX REV CODE- 250: Performed by: INTERNAL MEDICINE

## 2022-05-04 PROCEDURE — 93005 ELECTROCARDIOGRAM TRACING: CPT

## 2022-05-04 RX ORDER — BISACODYL 5 MG
5 TABLET, DELAYED RELEASE (ENTERIC COATED) ORAL DAILY PRN
Status: DISCONTINUED | OUTPATIENT
Start: 2022-05-04 | End: 2022-05-17 | Stop reason: HOSPADM

## 2022-05-04 RX ORDER — FUROSEMIDE 40 MG/1
40 TABLET ORAL DAILY
Status: DISCONTINUED | OUTPATIENT
Start: 2022-05-04 | End: 2022-05-17 | Stop reason: HOSPADM

## 2022-05-04 RX ORDER — PROMETHAZINE HYDROCHLORIDE 25 MG/1
12.5 TABLET ORAL
Status: DISCONTINUED | OUTPATIENT
Start: 2022-05-04 | End: 2022-05-17 | Stop reason: HOSPADM

## 2022-05-04 RX ORDER — ATORVASTATIN CALCIUM 10 MG/1
10 TABLET, FILM COATED ORAL
Status: DISCONTINUED | OUTPATIENT
Start: 2022-05-04 | End: 2022-05-17 | Stop reason: HOSPADM

## 2022-05-04 RX ORDER — ACETAMINOPHEN 325 MG/1
650 TABLET ORAL
Status: DISCONTINUED | OUTPATIENT
Start: 2022-05-04 | End: 2022-05-17 | Stop reason: HOSPADM

## 2022-05-04 RX ORDER — ENOXAPARIN SODIUM 100 MG/ML
30 INJECTION SUBCUTANEOUS EVERY 12 HOURS
Status: DISCONTINUED | OUTPATIENT
Start: 2022-05-04 | End: 2022-05-04

## 2022-05-04 RX ORDER — POLYETHYLENE GLYCOL 3350 17 G/17G
17 POWDER, FOR SOLUTION ORAL DAILY
Status: DISCONTINUED | OUTPATIENT
Start: 2022-05-04 | End: 2022-05-17 | Stop reason: HOSPADM

## 2022-05-04 RX ORDER — OXYCODONE HYDROCHLORIDE 5 MG/1
5 TABLET ORAL
Status: DISCONTINUED | OUTPATIENT
Start: 2022-05-04 | End: 2022-05-04

## 2022-05-04 RX ORDER — SODIUM CHLORIDE 0.9 % (FLUSH) 0.9 %
5-40 SYRINGE (ML) INJECTION AS NEEDED
Status: DISCONTINUED | OUTPATIENT
Start: 2022-05-04 | End: 2022-05-17 | Stop reason: HOSPADM

## 2022-05-04 RX ORDER — ENOXAPARIN SODIUM 100 MG/ML
40 INJECTION SUBCUTANEOUS EVERY 24 HOURS
Status: DISCONTINUED | OUTPATIENT
Start: 2022-05-04 | End: 2022-05-04

## 2022-05-04 RX ORDER — ONDANSETRON 2 MG/ML
4 INJECTION INTRAMUSCULAR; INTRAVENOUS
Status: DISCONTINUED | OUTPATIENT
Start: 2022-05-04 | End: 2022-05-17 | Stop reason: HOSPADM

## 2022-05-04 RX ORDER — FENTANYL CITRATE 50 UG/ML
25 INJECTION, SOLUTION INTRAMUSCULAR; INTRAVENOUS ONCE
Status: COMPLETED | OUTPATIENT
Start: 2022-05-04 | End: 2022-05-04

## 2022-05-04 RX ORDER — AMMONIUM LACTATE 12 G/100G
LOTION TOPICAL 2 TIMES DAILY
Status: DISCONTINUED | OUTPATIENT
Start: 2022-05-04 | End: 2022-05-17 | Stop reason: HOSPADM

## 2022-05-04 RX ORDER — OXYCODONE HYDROCHLORIDE 5 MG/1
5-10 TABLET ORAL
Status: DISCONTINUED | OUTPATIENT
Start: 2022-05-04 | End: 2022-05-07

## 2022-05-04 RX ORDER — MAGNESIUM SULFATE 1 G/100ML
1 INJECTION INTRAVENOUS ONCE
Status: COMPLETED | OUTPATIENT
Start: 2022-05-04 | End: 2022-05-04

## 2022-05-04 RX ORDER — SODIUM CHLORIDE 9 MG/ML
100 INJECTION, SOLUTION INTRAVENOUS CONTINUOUS
Status: DISCONTINUED | OUTPATIENT
Start: 2022-05-04 | End: 2022-05-10

## 2022-05-04 RX ORDER — LEVOFLOXACIN 5 MG/ML
750 INJECTION, SOLUTION INTRAVENOUS EVERY 24 HOURS
Status: DISCONTINUED | OUTPATIENT
Start: 2022-05-04 | End: 2022-05-06

## 2022-05-04 RX ORDER — POTASSIUM CHLORIDE 750 MG/1
20 TABLET, FILM COATED, EXTENDED RELEASE ORAL
Status: COMPLETED | OUTPATIENT
Start: 2022-05-04 | End: 2022-05-04

## 2022-05-04 RX ORDER — MICONAZOLE NITRATE 20 MG/G
CREAM TOPICAL 2 TIMES DAILY
Status: DISCONTINUED | OUTPATIENT
Start: 2022-05-04 | End: 2022-05-17 | Stop reason: HOSPADM

## 2022-05-04 RX ORDER — ENOXAPARIN SODIUM 150 MG/ML
135 INJECTION SUBCUTANEOUS EVERY 12 HOURS
Status: DISCONTINUED | OUTPATIENT
Start: 2022-05-04 | End: 2022-05-06

## 2022-05-04 RX ORDER — METOPROLOL TARTRATE 5 MG/5ML
5 INJECTION INTRAVENOUS ONCE
Status: COMPLETED | OUTPATIENT
Start: 2022-05-04 | End: 2022-05-04

## 2022-05-04 RX ORDER — IPRATROPIUM BROMIDE AND ALBUTEROL SULFATE 2.5; .5 MG/3ML; MG/3ML
3 SOLUTION RESPIRATORY (INHALATION)
Status: DISCONTINUED | OUTPATIENT
Start: 2022-05-04 | End: 2022-05-07

## 2022-05-04 RX ORDER — SODIUM CHLORIDE 0.9 % (FLUSH) 0.9 %
5-40 SYRINGE (ML) INJECTION EVERY 8 HOURS
Status: DISCONTINUED | OUTPATIENT
Start: 2022-05-04 | End: 2022-05-17 | Stop reason: HOSPADM

## 2022-05-04 RX ORDER — ACETAMINOPHEN 650 MG/1
650 SUPPOSITORY RECTAL
Status: DISCONTINUED | OUTPATIENT
Start: 2022-05-04 | End: 2022-05-17 | Stop reason: HOSPADM

## 2022-05-04 RX ORDER — METOPROLOL TARTRATE 25 MG/1
25 TABLET, FILM COATED ORAL 2 TIMES DAILY
Status: DISCONTINUED | OUTPATIENT
Start: 2022-05-04 | End: 2022-05-17 | Stop reason: HOSPADM

## 2022-05-04 RX ADMIN — AMIODARONE HYDROCHLORIDE 1 MG/MIN: 50 INJECTION, SOLUTION INTRAVENOUS at 17:16

## 2022-05-04 RX ADMIN — ENOXAPARIN SODIUM 135 MG: 150 INJECTION SUBCUTANEOUS at 21:46

## 2022-05-04 RX ADMIN — OXYCODONE 5 MG: 5 TABLET ORAL at 03:26

## 2022-05-04 RX ADMIN — OXYCODONE 10 MG: 5 TABLET ORAL at 21:25

## 2022-05-04 RX ADMIN — AMIODARONE HYDROCHLORIDE 150 MG: 1.5 INJECTION, SOLUTION INTRAVENOUS at 17:19

## 2022-05-04 RX ADMIN — SODIUM CHLORIDE, PRESERVATIVE FREE 10 ML: 5 INJECTION INTRAVENOUS at 21:19

## 2022-05-04 RX ADMIN — IPRATROPIUM BROMIDE AND ALBUTEROL SULFATE 3 ML: .5; 3 SOLUTION RESPIRATORY (INHALATION) at 20:02

## 2022-05-04 RX ADMIN — IPRATROPIUM BROMIDE AND ALBUTEROL SULFATE 3 ML: .5; 3 SOLUTION RESPIRATORY (INHALATION) at 10:51

## 2022-05-04 RX ADMIN — POTASSIUM CHLORIDE 20 MEQ: 750 TABLET, EXTENDED RELEASE ORAL at 09:12

## 2022-05-04 RX ADMIN — SODIUM CHLORIDE 500 ML: 9 INJECTION, SOLUTION INTRAVENOUS at 11:45

## 2022-05-04 RX ADMIN — LEVOFLOXACIN 750 MG: 5 INJECTION, SOLUTION INTRAVENOUS at 09:50

## 2022-05-04 RX ADMIN — ATORVASTATIN CALCIUM 10 MG: 10 TABLET, FILM COATED ORAL at 21:19

## 2022-05-04 RX ADMIN — SODIUM CHLORIDE 100 ML/HR: 9 INJECTION, SOLUTION INTRAVENOUS at 11:45

## 2022-05-04 RX ADMIN — ENOXAPARIN SODIUM 30 MG: 100 INJECTION SUBCUTANEOUS at 11:42

## 2022-05-04 RX ADMIN — METOPROLOL TARTRATE 25 MG: 25 TABLET, FILM COATED ORAL at 09:34

## 2022-05-04 RX ADMIN — FUROSEMIDE 40 MG: 40 TABLET ORAL at 09:34

## 2022-05-04 RX ADMIN — METOPROLOL TARTRATE 25 MG: 25 TABLET, FILM COATED ORAL at 19:11

## 2022-05-04 RX ADMIN — SODIUM CHLORIDE 100 ML/HR: 9 INJECTION, SOLUTION INTRAVENOUS at 17:22

## 2022-05-04 RX ADMIN — SODIUM CHLORIDE, PRESERVATIVE FREE 10 ML: 5 INJECTION INTRAVENOUS at 00:06

## 2022-05-04 RX ADMIN — OXYCODONE 10 MG: 5 TABLET ORAL at 09:37

## 2022-05-04 RX ADMIN — OXYCODONE 5 MG: 5 TABLET ORAL at 05:52

## 2022-05-04 RX ADMIN — MICONAZOLE NITRATE: 20 CREAM TOPICAL at 21:20

## 2022-05-04 RX ADMIN — METOPROLOL TARTRATE 5 MG: 5 INJECTION INTRAVENOUS at 11:41

## 2022-05-04 RX ADMIN — FENTANYL CITRATE 25 MCG: 50 INJECTION, SOLUTION INTRAMUSCULAR; INTRAVENOUS at 15:01

## 2022-05-04 RX ADMIN — MAGNESIUM SULFATE HEPTAHYDRATE 1 G: 1 INJECTION, SOLUTION INTRAVENOUS at 11:49

## 2022-05-04 NOTE — WOUND CARE
Wound care will eventually see this patient today. She has a left buttocks wound that bleeds easily. This was a pressure injury / traumatic shear wound that occurred at home prior to her last admission. She was just discharged last week. Plan:  IF she is admitted this time she will need a [address] bed from [address] [place] [address] due to her inability to move properly and she always weighs a lot more than she states.   Eh Moran RN, BSN, [facility]

## 2022-05-04 NOTE — ED NOTES
Bedside shift change report given to Ashlie RN (oncoming nurse) by Mayuir Nam RN (offgoing nurse). Report included the following information SBAR, ED Summary, Intake/Output and MAR.

## 2022-05-04 NOTE — PROGRESS NOTES
Pt -170's, pt states feeling her HR racing and tingly in her arms. Renny notified, orders received for bolus and IVF.

## 2022-05-04 NOTE — PROGRESS NOTES
Transition of Care Plan:    RUR:  14%  Disposition:  Likely to SNF  Follow up appointments:  PCP; ?  DME needed:  None if goes to SNF  Transportation at Discharge:  [address] or means to access home:  chetna      IM Medicare Letter: To be given closer to dc  Is patient a BCPI-A Bundle:    n/a       If yes, was Bundle Letter given?:    Is patient a [place] and connected with the [place]?     n/a           If yes, was [place] transfer form completed and VA notified? Caregiver Contact:  Ivy [phone]  Discharge Caregiver contacted prior to discharge? Yes  Care Conference needed? TBD    Reason for Readmission:   L hip fx and possible osteomyelitis. Pt had recent admission from 4/21 - 4/28 for resp fx, exacerbation of COPD. Was discharged home w/ chetna after declining SNF. [facility] was doing Nursing visits for respir status and wound care. [facility] staff report home to be very cluttered and caregiver burden very high due to pt being bedbound and unable to transfer. RUR Score/Risk Level:     14%    PCP: First and Last name:  Dr. Sameera Varner   Name of Practice: [facility]   Are you a current patient: Yes/No:  Yes   Approximate date of last visit:   4/30/22   Can you participate in a virtual visit with your PCP:  If chetna is present    Is a Care Conference indicated:   No      Did you attend your follow up appointment (s): If not, why not:  Yes         Resources/supports as identified by patient/family:   Asia Mckeon is a willing caregiver but is not able to turn/lift pt in her current condition. Top Challenges facing patient (as identified by patient/family and CM): Finances/Medication cost?   Medicare/Medicaid    Transportation    Can use Medicaid stretcher    Support system or lack thereof? Chetna  Living arrangements? 1-story home       Self-care/ADLs/Cognition?      Total care - bedbound, obese - pt is A & O       Current Advanced Directive/Advance Care Plan: .    Advance Care Planning     General Advance Care Planning (ACP) Conversation      Date of Conversation:  5/4/22  Conducted with: Patient with Decision Making Capacity    Healthcare Decision Maker:     Primary Decision Maker (Active): Kianna Cadet - George - [phone]    Secondary Decision Maker: Barbra Merchant - Sister - [phone]  Click here to complete [employer] including selection of the Healthcare Decision Maker Relationship (ie \"Primary\")      Content/Action Overview: Has ACP document(s) on file - reflects the patient's care preferences  Reviewed DNR/DNI and patient elects Full Code (Attempt Resuscitation)    Length of Voluntary ACP Conversation in minutes:  5 mins    DOROTHY Noriega                      Plan for utilizing home health:   Currently open to [facility]             Transition of Care Plan:    Based on readmission, the patient's previous Plan of Care   has been evaluated and/or modified. The current Transition of Care Plan is:    Pt is to be admitted as IP - awaiting floor bed. Consults for Ortho ; ID; Cardiology; Hospitalist      CM spoke w/ pt and chetna Haynes by phone to obtain assessment info. [facility] RN advised pt to go to ED when wounds not healing/worse. Chetna reported that she is unable to lift/turn pt for complete hygiene - she and pt agree that pt will need to go to SNF at dc. Chetna stated that process has been started to screen pt for Medicaid aide. CM provided [address] for SNF facilities - 1 st choice is [facility]; chetna wants to do more research to come up with 2nd choice. Ref sent to [facility] via [facility]. SNF list emailed to chetna. Pt is rooming in ED - awaiting floor bed. Once assigned to unit, CM will follow for continued dc planning.     Readmission Assessment  Number of days since last admission?: 1-7 days  Previous disposition: Home with Home Health  Who is being interviewed?: Patient,Caregiver  What was the patient's/caregiver's perception as to why they think they needed to return back to the hospital?: Did not realize care needs would be so extensive,Not enough help at home  Did you visit your Primary Care Physician after you left the hospital, before you returned this time?: Yes  Did you see a specialist, such as Cardiac, Pulmonary, Orthopedic Physician, etc. after you left the hospital?: No  Who advised the patient to return to the hospital?: [address] [place] [address] Staff  Does the patient report anything that got in the way of taking their medications?: No  In our efforts to provide the best possible care to you and others like you, can you think of anything that we could have done to help you after you left the hospital the first time, so that you might not have needed to return so soon?: Additional Community resources available for illness support                 DOROTHY Duke

## 2022-05-04 NOTE — H&P
Hospitalist Admission Note    NAME:  Shanti Childs   :   1960   MRN:   [id number]     Date of admit: 5/3/2022    PCP: Unknown, Provider, MD    Assessment/Plan:     Left hip fracture displaced POA age undetermined  Question left hip osteomyelitis POA  Bilateral buttock and sacral wounds POA  She reports bilateral severe leg pain when moved, especially left hip  She only reports trauma years ago, denies any recently  She is very vague in terms of how long this has been going on  Found on CT scan done in the emergency room rule out osteomyelitis   Original reason she was sent to the ED was because of worsening buttock/sacral wounds  MRI was done which is more suggestive of fracture and osteomyelitis  [address] [place] [address] was contacted await their input  As needed oxycodone  Did not examine the wounds given the pain on motion  Will consult wound care    Diarrhea at home  Severe diarrhea at discharge, led to worsening skin irritation in her sacral and buttock area  Concerned about these wounds prompted transfer  Did receive IV antibiotics last admission  She reports the diarrhea is a bit better, will check stool studies and C. Difficile  Stop antibiotics if urine culture negative    Possible UTI POA  UA 30 mg percent protein, nitrate positive, moderate blood, 0-5 RBCs, 20-50 WBCs, 1+ bacteria   Check CPK to rule out rhabdo given the UA profile  Urine culture in lab  IV ceftriaxone, stop if urine culture negative    Chronic hypoxic respiratory failure POA Baseline on 3 to 4 L nasal cannula oxygen  Chronic diastolic congestive heart failure POA  COPD POA  Pt is currently stable on 3-4LNC, her baseline requirement. Recent admission  to 2022 with shortness of breath and hypoxia  Treated with a course of steroids, doxycycline, diuresis  Discharged home at baseline  Continue diuresis IV  Currently off prednisone \"it was causing diarrhea\"  Continue nebs and oxygen      Paroxysmal A.  Fib  Not on any blood thinner and not taking aspirin because she getting nosebleeds with aspirin. Multiple prior cardioversion procedures  Continue metoprolol. Currently in normal sinus rhythm    Steroid induced hyperglycemia  Glycemic last admit on steroids   A1c level 5.4 on 4/21  Currently taking diabetes medicines at homeHypertension controlled. Cont' PTA meds.      Morbid obesity/Impaired mobility/functional quadriplegia  Does not ambulate for over 2 years  With her daughter, having increasing difficulty caring for the patient     Morbid Obesity POA Body mass index is 52.06 kg/m². Given the patient's current clinical presentation, I have a high level of concern for decompensation if discharged from the emergency department. My assessment of this patient's clinical condition and my plan of care is as noted above. DVT prophylaxis with lovenox    Code status:full code  NOK: Daughter    History     CHIEF COMPLAINT: Sent in by home health with worsening buttock wounds since last discharge 4/27/2022, found to have a new left hip fracture    HISTORY OF PRESENT ILLNESS:     57 y. o. female    Lives with her daughter who helps care for her  Prior notes not a good social situation for her to be living, house very cluttered    Past medical history of chronic hypoxic and hypercapnic respiratory failure  Baseline on 4 L nasal cannula at baseline  Suspected COPD   Chronic diastolic congestive heart failure  Probable JOSLYN and obesity hypoventilation syndrome on CPAP at night    Essentially bedbound for 2 years, due to arthritis in her respiratory issues  baseline reports severe pain in both her legs due to bad arthritis in her knees    Multiple prior admissions due to respiratory issues in the past    Recently admitted 4/21 to 4/27/2022 with increasing shortness of breath and hypoxia on her home O2  Found to have acute on chronic diastolic congestive heart failure and COPD  Treated with a course of steroids, p.o. doxycycline for 5 days, nebs, IV diuresis  Found to have buttock wounds that were managed by wound care  Eventually discharged home with home health    After discharge developed severe diarrhea \"terrible\" for the first couple days now improving  She required a lot of cleaning which seem to exacerbate some of her buttock wounds  Home health was following with concerns for new developing ulcers  This prompted her transfer back to the emergency room    Emergency room  WBC elevated to 16.4, recently received steroids  CT scan pelvis to look for osteomyelitis showed a displaced femoral neck fracture   Showed erosive changes and sclerosis   Also possible evidence of acute osteo or septic arthritis at the hip  MRI left hip prelim showed left femoral neck fracture with surrounding enhancement and edema within the hip musculature   Felt to be less consistent with osteomyelitis or infection    She says her breathing is \"okay\"  No chest pain or abdominal pain, headaches, sore throat  Severe pain in her legs especially when moved  She denies any trauma in the past few years  She is very vague about when the pain worsened  She said it recently became more constant but cannot pin down timeframe    she said maybe weeks to months  [facility] was contacted about the hip findings  abnormal UA noted --> due to IV antibiotics  We were called to admit the patient      Past Medical History:   Diagnosis Date    Acute on chronic respiratory failure with hypoxia and hypercapnia ([address] 11/20/2020    Atrial fibrillation ([address] 11/4/2020    CHF (congestive heart failure) ([address]     Diabetes ([address]     Hypertension     Ill-defined condition     high cholesterol    Ill-defined condition     tachycardia    JOSLYN (obstructive sleep apnea) 11/20/2020    Other and unspecified symptoms and signs involving general sensations and perceptions     ruptured disc    S/P cardiac cath 11/4/2020    11/3/2020 nonobstructive disease        Past Surgical History: Procedure Laterality Date    CO COMPRE ELECTROPHYSIOL XM W/LEFT ATRIAL PACNG/REC N/A 2020    Lt Atrial Pace & Record During Ep Study performed by Edward Chavez MD at [facility] CARDIAC CATH LAB    CO COMPRE EP EVAL ABLTJ 3D MAPG TX SVT N/A 2020    ABLATION A-FLUTTER performed by Edward Chavez MD at [facility] CARDIAC CATH LAB    CO ICAR CATHETER ABLATION ARRHYTHMIA ADD ON N/A 2020    Ablation Svt/Vt Add On performed by Edward Chavez MD at [facility] CARDIAC CATH LAB    CO INTRACARDIAC ELECTROPHYSIOLOGIC 3D MAPPING N/A 2020    Ep 3d Mapping performed by Edward Chavez MD at [facility] CARDIAC CATH LAB    CO STIM/PACING HEART POST IV DRUG INFU N/A 2020    Drug Stimulation performed by Edward Chavez MD at [facility] CARDIAC CATH LAB       Social History     Tobacco Use    Smoking status: Former Smoker    Smokeless tobacco: Never Used   Substance Use Topics    Alcohol use: No        Family history:  Daughter healthy  Multiple family members have anxiety depression  Mother had coronary artery disease  Sister was diabetic and  of complications from this    Allergies   Allergen Reactions    Cefazolin Rash    Other Plant, Animal, Environmental Rash     Ivory soap caused rash on hands. Prior to Admission medications    Medication Sig Start Date End Date Taking? Authorizing Provider   predniSONE (DELTASONE) 10 mg tablet 4 tabs daily for 2 days, then 3 tabs daily for 2 days , then 2 tabs daily for 2 days, then 1 tab daily for 2 days 22   Vinnie Barba MD   albuterol-ipratropium (DUO-NEB) 2.5 mg-0.5 mg/3 ml nebu 3 mL by Nebulization route every four (4) hours as needed for Wheezing, Shortness of Breath or Respiratory Distress. 20   Debbi Steiner MD   acetaminophen (TYLENOL) 325 mg tablet Take 650 mg by mouth every six (6) hours as needed for Pain. Provider, Historical   furosemide (LASIX) 40 mg tablet Take 1 Tab by mouth daily.   Patient taking differently: Take 20 mg by mouth daily. Takes prn when fluid overloaded 20   Anna Watson MD   atorvastatin (LIPITOR) 10 mg tablet Take 10 mg by mouth nightly. Russ Cano MD   metoprolol (LOPRESSOR) 25 mg tablet Take 25 mg by mouth two (2) times a day.     Russ Cano MD       Review of symptoms:     POSITIVE= Bold  Negative = not bold  General:  fever, chills, sweats  Eyes:    blurred vision, eye pain, double vision  ENT:    Coryza, sore throat, trouble swallowing  Respiratory:   cough, sputum, SOB  Cardiology:   chest pain, orthopnea, PND, edema  Gastrointestinal:  abdominal pain , N/V, diarrhea, constipation, melena or BRBPR  Genitourinary:  Urgency, dysuria, hematuria  Muskuloskeletal :  Joint redness, swelling or acute joint pain, myalgias, severe leg pain bilaterally  Hematology:  easy bruising, nose or gum bleeding  Dermatological: rash, ulceration  Endocrine:   Polyuria or polydipsia, heat or hold intolerance  Neurological:  Headache, focal motor or sensory changes     Speech difficulties, memory loss  Psychological: depression, agitation      Objective:   VITALS:    Patient Vitals for the past 24 hrs:   Temp Pulse Resp BP SpO2   22 2317 -- -- -- (!) 143/64 99 %   22 2302 -- -- -- (!) 133/43 97 %   22 2258 -- -- -- (!) 154/61 98 %   22 1700 -- -- -- (!) 138/49 98 %   22 1645 -- 87 -- (!) 136/59 99 %   22 1630 -- -- -- 127/63 97 %   22 1527 98.2 °F (36.8 °C) 89 20 (!) 151/67 100 %     Temp (24hrs), Av.2 °F (36.8 °C), Min:98.2 °F (36.8 °C), Max:98.2 °F (36.8 °C)    O2 Flow Rate (L/min): 4 l/min O2 Device: None (Room air)    Wt Readings from Last 12 Encounters:   22 133.3 kg (293 lb 14 oz)   22 134.7 kg (297 lb)   20 131.5 kg (290 lb)   20 136 kg (299 lb 13.2 oz)   20 138.1 kg (304 lb 8 oz)   20 142 kg (313 lb)   09/15/20 140.2 kg (309 lb)   20 140.2 kg (309 lb)   20 140.2 kg (309 lb)   20 142 kg (313 lb)   20 149.1 kg (328 lb 12.8 oz)   12/27/19 136.1 kg (300 lb)         PHYSICAL EXAM:     I had a face to face encounter and independently examined this patient on 05/03/22  as outlined below:    General:    Alert, cooperative in no distress     HEENT: Normocephalic, atraumatic    PERRL,  Sclera no icterus    Nasal mucosa without masses or discharge  Hearing intact to voice    Oropharynx without erythema or exudate   Neck:  No meningismus, trachea midline, no carotid bruits     Thyroid not enlarged, no nodules or tenderness  Lungs:   Clear to auscultation bilaterally. No wheezing or rales    No accessory muscle use or retractions. Heart:   Regular rate and rhythm,  no murmur or gallop. 2+ LE edema  Abdomen:   Soft, non-tender. Not distended. Bowel sounds normal.     No masses, No Hepatosplenomegaly, No Rebound or guarding  Lymph nodes: No cervical or inguinal MONICA  Musculoskeletal:  No Joint swelling, erythema, warmth. No Cyanosis or clubbing  Skin:      No rashes     Not Jaundiced   No nodules or thickening  Neurologic: Alert and oriented X 3, follows commands     Cranial nerves 2 to 12 intact       LAB DATA REVIEWED:    Recent Results (from the past 12 hour(s))   CBC WITH AUTOMATED DIFF    Collection Time: 05/03/22  4:00 PM   Result Value Ref Range    WBC 16.4 (H) 3.6 - 11.0 K/uL    RBC 3.88 3.80 - 5.20 M/uL    HGB 12.2 11.5 - 16.0 g/dL    HCT 39.8 35.0 - 47.0 %    .6 (H) 80.0 - 99.0 FL    MCH 31.4 26.0 - 34.0 PG    MCHC 30.7 30.0 - 36.5 g/dL    RDW 16.3 (H) 11.5 - 14.5 %    PLATELET 060 (L) 837 - 400 K/uL    MPV 12.3 8.9 - 12.9 FL    NRBC 0.0 0  WBC    ABSOLUTE NRBC 0.00 0.00 - 0.01 K/uL    NEUTROPHILS 87 (H) 32 - 75 %    LYMPHOCYTES 6 (L) 12 - 49 %    MONOCYTES 5 5 - 13 %    EOSINOPHILS 1 0 - 7 %    BASOPHILS 0 0 - 1 %    IMMATURE GRANULOCYTES 1 (H) 0.0 - 0.5 %    ABS. NEUTROPHILS 14.3 (H) 1.8 - 8.0 K/UL    ABS. LYMPHOCYTES 0.9 0.8 - 3.5 K/UL    ABS. MONOCYTES 0.9 0.0 - 1.0 K/UL    ABS.  EOSINOPHILS 0.1 0.0 - 0.4 K/UL    ABS. BASOPHILS 0.0 0.0 - 0.1 K/UL    ABS. IMM. GRANS. 0.1 (H) 0.00 - 0.04 K/UL    DF AUTOMATED     METABOLIC PANEL, COMPREHENSIVE    Collection Time: 05/03/22  4:00 PM   Result Value Ref Range    Sodium 136 136 - 145 mmol/L    Potassium 3.6 3.5 - 5.1 mmol/L    Chloride 97 97 - 108 mmol/L    CO2 38 (H) 21 - 32 mmol/L    Anion gap 1 (L) 5 - 15 mmol/L    Glucose 234 (H) 65 - 100 mg/dL    BUN 22 (H) 6 - 20 MG/DL    Creatinine 0.69 0.55 - 1.02 MG/DL    BUN/Creatinine ratio 32 (H) 12 - 20      GFR est AA >60 >60 ml/min/1.73m2    GFR est non-AA >60 >60 ml/min/1.73m2    Calcium 8.8 8.5 - 10.1 MG/DL    Bilirubin, total 1.0 0.2 - 1.0 MG/DL    ALT (SGPT) 52 12 - 78 U/L    AST (SGOT) 68 (H) 15 - 37 U/L    Alk.  phosphatase 268 (H) 45 - 117 U/L    Protein, total 6.4 6.4 - 8.2 g/dL    Albumin 2.5 (L) 3.5 - 5.0 g/dL    Globulin 3.9 2.0 - 4.0 g/dL    A-G Ratio 0.6 (L) 1.1 - 2.2     EKG, 12 LEAD, INITIAL    Collection Time: 05/03/22  5:09 PM   Result Value Ref Range    Ventricular Rate 85 BPM    Atrial Rate 78 BPM    P-R Interval 152 ms    QRS Duration 82 ms    Q-T Interval 400 ms    QTC Calculation (Bezet) 476 ms    Calculated P Axis 62 degrees    Calculated R Axis 66 degrees    Calculated T Axis 88 degrees    Diagnosis       Sinus rhythm with marked sinus arrhythmia  Nonspecific ST abnormality  When compared with ECG of 21-APR-2022 01:37,  No significant change was found     POC LACTIC ACID    Collection Time: 05/03/22  6:00 PM   Result Value Ref Range    Lactic Acid (POC) 3.69 (HH) 0.40 - 2.00 mmol/L   SED RATE (ESR)    Collection Time: 05/03/22  6:41 PM   Result Value Ref Range    Sed rate, automated 26 0 - 30 mm/hr   CRP, HIGH SENSITIVITY    Collection Time: 05/03/22  6:41 PM   Result Value Ref Range    CRP, High sensitivity >9.5 mg/L   URINALYSIS W/ REFLEX CULTURE    Collection Time: 05/03/22  6:41 PM    Specimen: Urine   Result Value Ref Range    Color DARK YELLOW      Appearance TURBID (A) CLEAR      Specific gravity 1.010 1.003 - 1.030      pH (UA) 7.0 5.0 - 8.0      Protein 30 (A) NEG mg/dL    Glucose Negative NEG mg/dL    Ketone Negative NEG mg/dL    Bilirubin Negative NEG      Blood MODERATE (A) NEG      Urobilinogen 1.0 0.2 - 1.0 EU/dL    Nitrites Positive (A) NEG      Leukocyte Esterase LARGE (A) NEG      WBC 20-50 0 - 4 /hpf    RBC 0-5 0 - 5 /hpf    Epithelial cells FEW FEW /lpf    Bacteria 1+ (A) NEG /hpf    UA:UC IF INDICATED URINE CULTURE ORDERED (A) CNI      CA Oxalate crystals 2+ (A) NEG   POC LACTIC ACID    Collection Time: 05/03/22 10:21 PM   Result Value Ref Range    Lactic Acid (POC) 1.52 0.40 - 2.00 mmol/L       EKG as read by me shows normal sinus rhythm rate 85, normal axis, no hypertrophy, normal intervals  no ischemic ST-T wave changes      CT Results  (Last 48 hours)               05/03/22 1732  CT ABD PELV W CONT Final result    Impression:  1. Displaced left femoral neck fracture deformity with aggressive erosive   changes and sclerosis along the fracture margin (838-98). Consider acute   osteomyelitis and/or septic arthritis given the clinical presentation. 2.  Bilateral nonobstructing nephrolithiasis. 3.  Anusha mesentery, nonspecific finding which can be seen with enteritis   etiologies (e.g., mesenteric panniculitis, enteritis, pancreatitis, etc.)           Narrative:  EXAM: CT ABD PELV W CONT       INDICATION: multiple decubitus ulcers, eval for associated abscess/osteo        COMPARISON: Abdominal radiograph 6/25/2020        CONTRAST: 100 mL of Isovue-370. ORAL CONTRAST: None       TECHNIQUE:    Following the uneventful intravenous administration of contrast, thin axial   images were obtained through the abdomen and pelvis. Coronal and sagittal   reconstructions were generated. CT dose reduction was achieved through use of a   standardized protocol tailored for this examination and automatic exposure   control for dose modulation. FINDINGS:    LOWER THORAX: Cardiomegaly. Patchy atelectasis/scarring in the lung bases   LIVER: No mass. BILIARY TREE: Gallbladder is within normal limits. CBD is not dilated. SPLEEN: within normal limits. PANCREAS: Atrophic. No mass or ductal dilatation. ADRENALS: Unremarkable. KIDNEYS:  Several nonobstructing bilateral calculi measuring up to 10 mm in the   right upper pole. No hydronephrosis. No mass. Subcentimeter left upper pole   hypodensity which is too small to accurately characterize; no dedicated   follow-up recommended   STOMACH: Unremarkable. SMALL BOWEL: No dilatation or wall thickening. COLON: No dilatation or wall thickening. APPENDIX: ? Partial appendectomy   PERITONEUM: No ascites or pneumoperitoneum. RETROPERITONEUM: No lymphadenopathy or aortic aneurysm. Atherosclerosis. REPRODUCTIVE ORGANS: Uterus and adnexa are within normal limits   URINARY BLADDER: No mass or calculus. BONES: Displaced left femoral neck fracture deformity with aggressive erosive   changes and sclerosis along the fracture margin (632-15)   ABDOMINAL WALL: No mass or hernia. ADDITIONAL COMMENTS: Anusha mesentery (929-23). CT ABD PELV W CONT    Result Date: 5/3/2022  1. Displaced left femoral neck fracture deformity with aggressive erosive changes and sclerosis along the fracture margin (771-90). Consider acute osteomyelitis and/or septic arthritis given the clinical presentation. 2.  Bilateral nonobstructing nephrolithiasis. 3.  Anusha mesentery, nonspecific finding which can be seen with enteritis etiologies (e.g., mesenteric panniculitis, enteritis, pancreatitis, etc.)     XR CHEST PORT    Result Date: 5/3/2022  Improved congestion and pulmonary edema with possible left pleural effusion. I saw the patient personally, took a history and did a complete physical exam at the bedside. I performed complex decision making in coming up with a diagnostic and treatment plan for the patient.  I reviewed the patient's past medical records, current laboratory and radiology results, and actual Xray films/EKG. I have also discussed this case with the involved ED physician.     Care Plan discussed with:    Patient,  ED Doc    Risk of deterioration:  High    Total Time Coordinating Admission:  65   minutes    Total Critical Care Time:         Linh Mariscal MD

## 2022-05-04 NOTE — PROGRESS NOTES
10:00: HR up to 160s, Attending physician notified  10:08: EKG obtained, Afib w/ RVR  14:29: spoke w/ Dr. Diane Ashton, he will be down to assess pt shortly, requests that we call Cardiology & ask them to \"see pt early\"  15:27: Dr. Victor Hugo Velasquez to bedside for Cardiology consult  15:59: pt has ID consult, notified Dr. Kaz Márquezew via [employer] that Dr. Estrada Nick had requested new ID consult, Reason for Consult: \"L femoral neck osteomyelitis? \"   16:28: Dr. Marii Peres replies to [facility] for new consult, \"ok\"  16:46: attempted to call report on pt going to PCU room #[id number], Melissa states that Alyse Templeton RN is unavailable to take report at this time  16:56: pt has Palliative Care consult, called Palliative [phone] to get pt assigned, Dr. Linh Varner to see pt  17:19: Amio bolus + gtt started  17:56: report called to Aylse Templeton RN on PCU    18:21: pt transferred off unit by Darrian Martinez RN

## 2022-05-04 NOTE — CONSULTS
Pulmonary and Critical Care  Consult Note    Requesting Provider: Dr. Gavin Bowers    Reason for Consult: Tachycardia    HPI: The patient is a 61/F with medical history as below who we are seeing in consultation at the request of Dr. aGvin Bowers for evaluation of sinus tachycardia. Briefly, the patient has multiple medical problems as below who has been admitted with left hip fracture, osteomyelitis and sacral wounds. She is being treated with IVF and antibiotics. Patient has tachycardia for which EKG was done, EKG showed sinus tachycardia. We are consulted for further evaluation. Patient, of note received lopressor IV 5 mg x 1 but without any help. On my arrival, patient is lying comfortably on the bed. She is on 2 L/min supplemental oxygen saturating 99%. Normal work of breathing. Able to give history, she is complaining of back pain and left hip pain and requesting pain medications. Review of Systems: All other systems have been reviewed and are negative except per HPI    Past Medical History:  Past Medical History:   Diagnosis Date    Acute on chronic respiratory failure with hypoxia and hypercapnia ([address] 11/20/2020    Atrial fibrillation ([address] 11/4/2020    CHF (congestive heart failure) (HCC)     Diabetes ([facility]     Hypertension     Ill-defined condition     high cholesterol    Ill-defined condition     tachycardia    JOSLYN (obstructive sleep apnea) 11/20/2020    Other and unspecified symptoms and signs involving general sensations and perceptions     ruptured disc    S/P cardiac cath 11/4/2020    11/3/2020 nonobstructive disease       Social History:   reports that she has quit smoking. She has never used smokeless tobacco. She reports that she does not drink alcohol and does not use drugs. Family History:  No family history on file. Allergies: Allergies   Allergen Reactions    Cefazolin Rash    Other Plant, Animal, Environmental Rash     Ivory soap caused rash on hands. Medications:  Current Facility-Administered Medications   Medication Dose Route Frequency Provider Last Rate Last Admin    sodium chloride (NS) flush 5-40 mL  5-40 mL IntraVENous Q8H Latha Choe MD   10 mL at 05/04/22 0006    sodium chloride (NS) flush 5-40 mL  5-40 mL IntraVENous PRN Derrick Luna MD        acetaminophen (TYLENOL) tablet 650 mg  650 mg Oral Q6H PRN Derrick Luna MD        Or    acetaminophen (TYLENOL) suppository 650 mg  650 mg Rectal Q6H PRN Derrick Luna MD        polyethylene glycol (MIRALAX) packet 17 g  17 g Oral DAILY Latha Choe MD        bisacodyL (DULCOLAX) tablet 5 mg  5 mg Oral DAILY PRN Derrick Luna MD        promethazine (PHENERGAN) tablet 12.5 mg  12.5 mg Oral Q6H PRN Derrick Luna MD        Or    ondansetron [facility]) injection 4 mg  4 mg IntraVENous Q6H PRN Derrick Luna MD        oxyCODONE IR (ROXICODONE) tablet 5-10 mg  5-10 mg Oral Q6H PRN Derrick Luna MD   10 mg at 05/04/22 2152    albuterol-ipratropium (DUO-NEB) 2.5 MG-0.5 MG/3 ML  3 mL Nebulization QID RT Derrick Luna MD   3 mL at 05/04/22 1051    atorvastatin (LIPITOR) tablet 10 mg  10 mg Oral QHS Derrick Luna MD        metoprolol tartrate (LOPRESSOR) tablet 25 mg  25 mg Oral BID Derrick Luna MD   25 mg at 05/04/22 0934    [Held by provider] furosemide (LASIX) tablet 40 mg  40 mg Oral DAILY Derrick Luna MD   40 mg at 05/04/22 0934    levoFLOXacin (LEVAQUIN) 750 mg in D5W IVPB  750 mg IntraVENous Q24H Derrick Luna  mL/hr at 05/04/22 0950 750 mg at 05/04/22 0950    0.9% sodium chloride infusion  100 mL/hr IntraVENous CONTINUOUS Mya PEREZ  mL/hr at 05/04/22 1145 100 mL/hr at 05/04/22 1145    miconazole (SECURA) 2 % extra thick cream   Topical BID Shellia Gu[place], MD        ammonium lactate (LAC-HYDRIN) 12 % lotion   Topical BID MD Felipe Cruz enoxaparin (LOVENOX) injection 135 mg  135 mg SubCUTAneous Q12H Corky Flair, NP        amiodarone (NEXTERONE) 150 mg in dextrose 5% 100 ml bolus premix 150 mg  150 mg IntraVENous ONCE Corky Flair, NP         Current Outpatient Medications   Medication Sig Dispense Refill    predniSONE (DELTASONE) 10 mg tablet 4 tabs daily for 2 days, then 3 tabs daily for 2 days , then 2 tabs daily for 2 days, then 1 tab daily for 2 days 20 Tablet 0    albuterol-ipratropium (DUO-NEB) 2.5 mg-0.5 mg/3 ml nebu 3 mL by Nebulization route every four (4) hours as needed for Wheezing, Shortness of Breath or Respiratory Distress. 100 Nebule 0    acetaminophen (TYLENOL) 325 mg tablet Take 650 mg by mouth every six (6) hours as needed for Pain.  furosemide (LASIX) 40 mg tablet Take 1 Tab by mouth daily. (Patient taking differently: Take 20 mg by mouth daily. Takes prn when fluid overloaded) 30 Tab 0    atorvastatin (LIPITOR) 10 mg tablet Take 10 mg by mouth nightly.  metoprolol (LOPRESSOR) 25 mg tablet Take 25 mg by mouth two (2) times a day. Vital Signs:  Visit Vitals  /66   Pulse (!) 101   Temp 98.9 °F (37.2 °C)   Resp 23   Ht 5' 3\" (1.6 m)   Wt 133.3 kg (293 lb 14 oz)   SpO2 97%   BMI 52.06 kg/m²         Intake and Output:     Intake/Output Summary (Last 24 hours) at 5/4/2022 1538  Last data filed at 5/3/2022 2241  Gross per 24 hour   Intake 1000 ml   Output --   Net 1000 ml       Physical exam:   GEN: Sitting comfortably on the bed, not in acute distress. HEENT: anicteric sclerae, pink conj. NECK: Supple, -LAD, no neck mass  CV: no murmurs noted. LUNGS: Coarse BS at bases, otherwise no wheezes, rhonchi, rales  ABD: soft, non-tender, no masses  EXT: No cyanosis, distal pulses palpable, exfoliation of the skin noted in the legs bilaterally. Left hip is abducted. SKIN: warm, dry and intact. NEURO: Alert, awake and oriented x 3. No focal deficits.        Lab/Diagnostic Studies:    Recent Results (from the past 24 hour(s))   CBC WITH AUTOMATED DIFF    Collection Time: 05/03/22  4:00 PM   Result Value Ref Range    WBC 16.4 (H) 3.6 - 11.0 K/uL    RBC 3.88 3.80 - 5.20 M/uL    HGB 12.2 11.5 - 16.0 g/dL    HCT 39.8 35.0 - 47.0 %    .6 (H) 80.0 - 99.0 FL    MCH 31.4 26.0 - 34.0 PG    MCHC 30.7 30.0 - 36.5 g/dL    RDW 16.3 (H) 11.5 - 14.5 %    PLATELET 755 (L) 021 - 400 K/uL    MPV 12.3 8.9 - 12.9 FL    NRBC 0.0 0  WBC    ABSOLUTE NRBC 0.00 0.00 - 0.01 K/uL    NEUTROPHILS 87 (H) 32 - 75 %    LYMPHOCYTES 6 (L) 12 - 49 %    MONOCYTES 5 5 - 13 %    EOSINOPHILS 1 0 - 7 %    BASOPHILS 0 0 - 1 %    IMMATURE GRANULOCYTES 1 (H) 0.0 - 0.5 %    ABS. NEUTROPHILS 14.3 (H) 1.8 - 8.0 K/UL    ABS. LYMPHOCYTES 0.9 0.8 - 3.5 K/UL    ABS. MONOCYTES 0.9 0.0 - 1.0 K/UL    ABS. EOSINOPHILS 0.1 0.0 - 0.4 K/UL    ABS. BASOPHILS 0.0 0.0 - 0.1 K/UL    ABS. IMM. GRANS. 0.1 (H) 0.00 - 0.04 K/UL    DF AUTOMATED     METABOLIC PANEL, COMPREHENSIVE    Collection Time: 05/03/22  4:00 PM   Result Value Ref Range    Sodium 136 136 - 145 mmol/L    Potassium 3.6 3.5 - 5.1 mmol/L    Chloride 97 97 - 108 mmol/L    CO2 38 (H) 21 - 32 mmol/L    Anion gap 1 (L) 5 - 15 mmol/L    Glucose 234 (H) 65 - 100 mg/dL    BUN 22 (H) 6 - 20 MG/DL    Creatinine 0.69 0.55 - 1.02 MG/DL    BUN/Creatinine ratio 32 (H) 12 - 20      GFR est AA >60 >60 ml/min/1.73m2    GFR est non-AA >60 >60 ml/min/1.73m2    Calcium 8.8 8.5 - 10.1 MG/DL    Bilirubin, total 1.0 0.2 - 1.0 MG/DL    ALT (SGPT) 52 12 - 78 U/L    AST (SGOT) 68 (H) 15 - 37 U/L    Alk.  phosphatase 268 (H) 45 - 117 U/L    Protein, total 6.4 6.4 - 8.2 g/dL    Albumin 2.5 (L) 3.5 - 5.0 g/dL    Globulin 3.9 2.0 - 4.0 g/dL    A-G Ratio 0.6 (L) 1.1 - 2.2     EKG, 12 LEAD, INITIAL    Collection Time: 05/03/22  5:09 PM   Result Value Ref Range    Ventricular Rate 85 BPM    Atrial Rate 78 BPM    P-R Interval 152 ms    QRS Duration 82 ms    Q-T Interval 400 ms    QTC Calculation (Bezet) 476 ms Calculated P Axis 62 degrees    Calculated R Axis 66 degrees    Calculated T Axis 88 degrees    Diagnosis       Sinus rhythm with marked sinus arrhythmia  Nonspecific ST abnormality  When compared with ECG of 21-APR-2022 01:37,  No significant change was found  Confirmed by Stacie James ([id number]) on 5/4/2022 10:29:38 AM     POC LACTIC ACID    Collection Time: 05/03/22  6:00 PM   Result Value Ref Range    Lactic Acid (POC) 3.69 (HH) 0.40 - 2.00 mmol/L   CULTURE, BLOOD, PAIRED    Collection Time: 05/03/22  6:41 PM    Specimen: Blood   Result Value Ref Range    Special Requests: NO SPECIAL REQUESTS      Culture result: NO GROWTH AFTER 12 HOURS     SED RATE (ESR)    Collection Time: 05/03/22  6:41 PM   Result Value Ref Range    Sed rate, automated 26 0 - 30 mm/hr   CRP, HIGH SENSITIVITY    Collection Time: 05/03/22  6:41 PM   Result Value Ref Range    CRP, High sensitivity >9.5 mg/L   URINALYSIS W/ REFLEX CULTURE    Collection Time: 05/03/22  6:41 PM    Specimen: Urine   Result Value Ref Range    Color DARK YELLOW      Appearance TURBID (A) CLEAR      Specific gravity 1.010 1.003 - 1.030      pH (UA) 7.0 5.0 - 8.0      Protein 30 (A) NEG mg/dL    Glucose Negative NEG mg/dL    Ketone Negative NEG mg/dL    Bilirubin Negative NEG      Blood MODERATE (A) NEG      Urobilinogen 1.0 0.2 - 1.0 EU/dL    Nitrites Positive (A) NEG      Leukocyte Esterase LARGE (A) NEG      WBC 20-50 0 - 4 /hpf    RBC 0-5 0 - 5 /hpf    Epithelial cells FEW FEW /lpf    Bacteria 1+ (A) NEG /hpf    UA:UC IF INDICATED URINE CULTURE ORDERED (A) CNI      CA Oxalate crystals 2+ (A) NEG   CULTURE, URINE    Collection Time: 05/03/22  6:41 PM    Specimen: Urine   Result Value Ref Range    Special Requests: NO SPECIAL REQUESTS  Reflexed from [id number]        [place] Count >100,000  COLONIES/mL        Culture result: CHECKING FOR POSSIBLE MIXED CULTURE     POC LACTIC ACID    Collection Time: 05/03/22 10:21 PM   Result Value Ref Range    Lactic Acid (POC) 1. 52 0.40 - 2.14 mmol/L   METABOLIC PANEL, COMPREHENSIVE    Collection Time: 05/04/22  4:53 AM   Result Value Ref Range    Sodium 138 136 - 145 mmol/L    Potassium 3.3 (L) 3.5 - 5.1 mmol/L    Chloride 99 97 - 108 mmol/L    CO2 35 (H) 21 - 32 mmol/L    Anion gap 4 (L) 5 - 15 mmol/L    Glucose 152 (H) 65 - 100 mg/dL    BUN 17 6 - 20 MG/DL    Creatinine 0.44 (L) 0.55 - 1.02 MG/DL    BUN/Creatinine ratio 39 (H) 12 - 20      GFR est AA >60 >60 ml/min/1.73m2    GFR est non-AA >60 >60 ml/min/1.73m2    Calcium 8.8 8.5 - 10.1 MG/DL    Bilirubin, total 1.2 (H) 0.2 - 1.0 MG/DL    ALT (SGPT) 44 12 - 78 U/L    AST (SGOT) 45 (H) 15 - 37 U/L    Alk.  phosphatase 290 (H) 45 - 117 U/L    Protein, total 6.2 (L) 6.4 - 8.2 g/dL    Albumin 2.4 (L) 3.5 - 5.0 g/dL    Globulin 3.8 2.0 - 4.0 g/dL    A-G Ratio 0.6 (L) 1.1 - 2.2     PROCALCITONIN    Collection Time: 05/04/22  4:53 AM   Result Value Ref Range    Procalcitonin 1.41 ng/mL   C REACTIVE PROTEIN, QT    Collection Time: 05/04/22  4:53 AM   Result Value Ref Range    C-Reactive protein 8.89 (H) 0.00 - 0.60 mg/dL   LIPASE    Collection Time: 05/04/22  4:53 AM   Result Value Ref Range    Lipase 123 73 - 393 U/L   CK    Collection Time: 05/04/22  4:53 AM   Result Value Ref Range    CK 16 (L) 26 - 192 U/L   TROPONIN-HIGH SENSITIVITY    Collection Time: 05/04/22  4:53 AM   Result Value Ref Range    Troponin-High Sensitivity 32 0 - 51 ng/L   MAGNESIUM    Collection Time: 05/04/22  5:14 AM   Result Value Ref Range    Magnesium 2.2 1.6 - 2.4 mg/dL   EKG, 12 LEAD, INITIAL    Collection Time: 05/04/22 10:08 AM   Result Value Ref Range    Ventricular Rate 151 BPM    Atrial Rate 151 BPM    P-R Interval 206 ms    QRS Duration 62 ms    Q-T Interval 242 ms    QTC Calculation (Bezet) 383 ms    Calculated P Axis 55 degrees    Calculated R Axis 95 degrees    Calculated T Axis -172 degrees    Diagnosis       Atrial fibrillation with rapid ventricular response  Confirmed by Jase Gibbs ([id number]) on 5/4/2022 2:25:53 PM               Imaging:  CXR Results  (Last 48 hours)               05/03/22 2207  XR CHEST PORT Final result    Impression:  Improved congestion and pulmonary edema with possible left pleural   effusion. Narrative:  EXAM: XR CHEST PORT       INDICATION: eval for edema, infiltrate       COMPARISON: Chest x-ray 4/21/2022 and CT thorax 12/2/2020. FINDINGS: A portable AP radiograph of the chest was obtained at 21:57 hours. The   patient is on a cardiac monitor. The lungs show interval improvement in   congestion and interstitial edema with some persistent hazy opacity of the   bases, though exam is overall compromised by poor penetration of patient   habitus. The cardiac and mediastinal contours and pulmonary vascularity are   normal.  The chest wall structures and visualized upper abdomen show no acute   findings with incidental note of degenerative spine and shoulder changes as well   as diffuse osteopenia. CT Results  (Last 48 hours)               05/03/22 1732  CT ABD PELV W CONT Final result    Impression:  1. Displaced left femoral neck fracture deformity with aggressive erosive   changes and sclerosis along the fracture margin (475-40). Consider acute   osteomyelitis and/or septic arthritis given the clinical presentation. 2.  Bilateral nonobstructing nephrolithiasis. 3.  Anusha mesentery, nonspecific finding which can be seen with enteritis   etiologies (e.g., mesenteric panniculitis, enteritis, pancreatitis, etc.)           Narrative:  EXAM: CT ABD PELV W CONT       INDICATION: multiple decubitus ulcers, eval for associated abscess/osteo        COMPARISON: Abdominal radiograph 6/25/2020        CONTRAST: 100 mL of Isovue-370. ORAL CONTRAST: None       TECHNIQUE:    Following the uneventful intravenous administration of contrast, thin axial   images were obtained through the abdomen and pelvis. Coronal and sagittal   reconstructions were generated.  CT dose reduction was achieved through use of a   standardized protocol tailored for this examination and automatic exposure   control for dose modulation. FINDINGS:    LOWER THORAX: Cardiomegaly. Patchy atelectasis/scarring in the lung bases   LIVER: No mass. BILIARY TREE: Gallbladder is within normal limits. CBD is not dilated. SPLEEN: within normal limits. PANCREAS: Atrophic. No mass or ductal dilatation. ADRENALS: Unremarkable. KIDNEYS:  Several nonobstructing bilateral calculi measuring up to 10 mm in the   right upper pole. No hydronephrosis. No mass. Subcentimeter left upper pole   hypodensity which is too small to accurately characterize; no dedicated   follow-up recommended   STOMACH: Unremarkable. SMALL BOWEL: No dilatation or wall thickening. COLON: No dilatation or wall thickening. APPENDIX: ? Partial appendectomy   PERITONEUM: No ascites or pneumoperitoneum. RETROPERITONEUM: No lymphadenopathy or aortic aneurysm. Atherosclerosis. REPRODUCTIVE ORGANS: Uterus and adnexa are within normal limits   URINARY BLADDER: No mass or calculus. BONES: Displaced left femoral neck fracture deformity with aggressive erosive   changes and sclerosis along the fracture margin ([id number])   ABDOMINAL WALL: No mass or hernia. ADDITIONAL COMMENTS: Anusha mesentery ([phone]). Assessment      The patient is a 61/F with medical history as below who we are seeing in consultation at the request of Dr. Smith Jackson for evaluation of sinus tachycardia. Tachycardia - EKG showed it is sinus in origin. Physiological response, likely due to pain. I requested RN to give Fentanyl 25 mcg x 1. Plan  - Cont. To adequately control pain (she has sacral decubitus and left hip pain). Cont. IVF. - Her MAP is in 80's with normal mentation and lactic acid. I would recommend against treating sinus tachycardia per se with beta blockers or calcium channel blocker. Cont. To treat underlying condition. - Please check her TSH, T3 and T4  - Cardiology already consulted by primary team.  - No indication for ICU admission at this time.        Meghan Lozano MD, FCCP, FCCM, ATSF, FACP, [place]  Interventional Pulmonology/[address]

## 2022-05-04 NOTE — PROGRESS NOTES
1845: pt arrived to room 2248 from ER, on amio gtt, HR 150s. Patient cleaned from incont episode of urine. Skin noted to be in poor condition, multiple wounds noted. Primary Nurse Essence Alvares, MARIE and Megan Sanches RN performed a dual skin assessment on this patient Impairment noted- open wound on left lower abdomen, left hip, right and left buttocks (upper and lower buttocks), bilateral gluteal folds/upper thigh area abdominal pannus, groin and bilat breasts. Redness noted on L elbow, scattered red spots on R arm and elbow. Large purple/dark area with scaly skin noted on upper back/shoulders as well as mid back. . Scaly bilateral feet/fungus noted. 1925: Bedside, Verbal and Written shift change report given to Mychal Morillo (oncoming nurse) by Capo Gramajo RN (offgoing nurse). Report included the following information SBAR, Kardex, ED Summary, Intake/Output, MAR, Recent Results and Cardiac Rhythm afib.

## 2022-05-04 NOTE — CONSULTS
[facility]  [address]  [phone]  WWW. [employer]       CARDIOLOGY INITIAL EVALUATION:        Date of  Admission: 5/3/2022  3:22 PM     Admission type:Emergency   Primary Care Physician:Unknown, Blossom, MD     Attending Provider: Berny Lara MD  Cardiology Provider: Dr. Negro Johnson: Jeanna RVR      Subjective: Jenny Franco is a 57 y.o. female with PMHx of PAF, chronic hypercapneia, hypoxia, untreated JOSLYN, HTN admitted for Hip osteomyelitis ([facility] [address] [M86.9]     Per admitting provider, the patient was sent by [facility] with worsening decubitus wound since last DC 4/27/2022. Also found to have hip fracture.      Upon assessment, she endorses the above. She is a bed-bound chronically ill patient who is managed at home by her daughter. She reports no recent falls, states \"maybe it's been broken since 2 years ago when I fell out of my bed, but I haven't felt anything\". She does state she can feel her heart is racing, palpitations, shortness of breath. She is on chronic O2 3-4L, currently at baseline. She reports she takes medications as prescribed. Most recently she has had diarrhea, no fever, chills, n/v. She reports \"my daughter is having trouble taking care of me, maybe I can go to [facility] or something\". She denies any chest pain/tightness/discomfort, diaphoresis. She also denies any smoking, ETOH, or illicit drug use. She reports she has not taken any covid vaccines, is willing, but has not had any available to her.      She is an established patient of Dr. Avtar Grady and Dr. Chris Velazco, only seen inpatient because patient reports \"I cannot get to my appointments\". Previous treatment/evaluation includes echocardiogram 7/2020 showed EF 50-55% and Recent cardiac cath on 11/3/2020 with nonobstructive disease. She also had an AVNRT ablation back in 11/2020.  ECHO 4/2022 EF 60-65%, no significant valvular pathology.      Cardiac risk factors: smoking/ tobacco exposure, family history, dyslipidemia, obesity, sedentary life style, hypertension, stress, post-menopausal.    Patient Active Problem List    Diagnosis Date Noted    Hip osteomyelitis (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 11/20/2020    JOSLYN (obstructive sleep apnea) 11/20/2020    Acute on chronic respiratory failure with hypoxia and hypercapnia ([facility] 11/20/2020    Acute respiratory failure ([address] 11/18/2020    Mixed hyperlipidemia 11/15/2020    Morbid obesity ([address] 11/15/2020    A-fib ([address] 11/04/2020    S/P cardiac cath 11/04/2020    COVID-19 ruled out 07/24/2020      Unknown, Provider, MD  Past Medical History:   Diagnosis Date    Acute on chronic respiratory failure with hypoxia and hypercapnia ([facility] 11/20/2020    Atrial fibrillation ([facility] 11/4/2020    CHF (congestive heart failure) ([address]     Diabetes ([facility]     Hypertension     Ill-defined condition     high cholesterol    Ill-defined condition     tachycardia    JOSLYN (obstructive sleep apnea) 11/20/2020    Other and unspecified symptoms and signs involving general sensations and perceptions     ruptured disc    S/P cardiac cath 11/4/2020    11/3/2020 nonobstructive disease      Social History     Socioeconomic History    Marital status: SINGLE   Tobacco Use    Smoking status: Former Smoker    Smokeless tobacco: Never Used   Substance and Sexual Activity    Alcohol use: No    Drug use: No     Allergies   Allergen Reactions    Cefazolin Rash    Other Plant, Animal, Environmental Rash     Ivory soap caused rash on hands. No family history on file.    Current Facility-Administered Medications   Medication Dose Route Frequency    sodium chloride (NS) flush 5-40 mL  5-40 mL IntraVENous Q8H    sodium chloride (NS) flush 5-40 mL  5-40 mL IntraVENous PRN    acetaminophen (TYLENOL) tablet 650 mg  650 mg Oral Q6H PRN    Or    acetaminophen (TYLENOL) suppository 650 mg  650 mg Rectal Q6H PRN    polyethylene glycol (MIRALAX) packet 17 g  17 g Oral DAILY    bisacodyL (DULCOLAX) tablet 5 mg  5 mg Oral DAILY PRN    promethazine (PHENERGAN) tablet 12.5 mg  12.5 mg Oral Q6H PRN    Or    ondansetron (ZOFRAN) injection 4 mg  4 mg IntraVENous Q6H PRN    oxyCODONE IR (ROXICODONE) tablet 5-10 mg  5-10 mg Oral Q6H PRN    albuterol-ipratropium (DUO-NEB) 2.5 MG-0.5 MG/3 ML  3 mL Nebulization QID RT    atorvastatin (LIPITOR) tablet 10 mg  10 mg Oral QHS    metoprolol tartrate (LOPRESSOR) tablet 25 mg  25 mg Oral BID    [Held by provider] furosemide (LASIX) tablet 40 mg  40 mg Oral DAILY    levoFLOXacin (LEVAQUIN) 750 mg in D5W IVPB  750 mg IntraVENous Q24H    0.9% sodium chloride infusion  100 mL/hr IntraVENous CONTINUOUS    miconazole (SECURA) 2 % extra thick cream   Topical BID    ammonium lactate (LAC-HYDRIN) 12 % lotion   Topical BID    enoxaparin (LOVENOX) injection 135 mg  135 mg SubCUTAneous Q12H    amiodarone (NEXTERONE) 150 mg in dextrose 5% 100 ml bolus premix 150 mg  150 mg IntraVENous ONCE    amiodarone (CORDARONE) 375 mg/250 mL D5W infusion  1 mg/min IntraVENous CONTINUOUS    Followed by   Breanne Current amiodarone (CORDARONE) 375 mg/250 mL D5W infusion  0.5 mg/min IntraVENous CONTINUOUS     Current Outpatient Medications   Medication Sig    predniSONE (DELTASONE) 10 mg tablet 4 tabs daily for 2 days, then 3 tabs daily for 2 days , then 2 tabs daily for 2 days, then 1 tab daily for 2 days  albuterol-ipratropium (DUO-NEB) 2.5 mg-0.5 mg/3 ml nebu 3 mL by Nebulization route every four (4) hours as needed for Wheezing, Shortness of Breath or Respiratory Distress.  acetaminophen (TYLENOL) 325 mg tablet Take 650 mg by mouth every six (6) hours as needed for Pain.  furosemide (LASIX) 40 mg tablet Take 1 Tab by mouth daily. (Patient taking differently: Take 20 mg by mouth daily. Takes prn when fluid overloaded)    atorvastatin (LIPITOR) 10 mg tablet Take 10 mg by mouth nightly.  metoprolol (LOPRESSOR) 25 mg tablet Take 25 mg by mouth two (2) times a day.         Review of Symptoms:   Constitutional: negative  Eyes: negative  Ears, nose, mouth, throat, and face: negative  Respiratory: chest tightness and SOB, orthopnea, PND  Cardiovascular: negative  Gastrointestinal: negative  Genitourinary:negative  Musculoskeletal:negative  Neurological: negative  Behvioral/Psych: negative  Endocrine: negative        Objective:      Visit Vitals  /66   Pulse (!) 101   Temp 98.9 °F (37.2 °C)   Resp 23   Ht 5' 3\" (1.6 m)   Wt 133.3 kg (293 lb 14 oz)   SpO2 97%   BMI 52.06 kg/m²       Physical Exam   General Appearance:  alert, cooperative, morbidly obese  female in NAD, appears older than stated age   Eyes: sclera anicteric  Mouth/Throat: moist mucous membranes; oral pharynx clear  Neck: supple; no JVD or bruit  Pulmonary:  Diminished to auscultation bilaterally; good effort  Cardiovascular: rapid rate and irregular rhythm; no murmur, click, rub, or gallop  Abdomen: soft, non-tender, non-distended; bowel sounds normal  Musculoskeletal: no swelling or deformity; moves all extremities  Extremities: 3+ edema; palpable distal pulses   Skin: thick, leathery skin to lower extremities, sores, flaky    Neuro: grossly normal  Psych: normal mood and affect given the setting       Data Review:   Recent Labs     05/03/22  1600   WBC 16.4*   HGB 12.2   HCT 39.8   *     Recent Labs     05/04/22  0514 05/04/22  0453 05/03/22  1600   NA  --  138 136   K  --  3.3* 3.6   CL  --  99 97   CO2  --  35* 38*   GLU  --  152* 234*   BUN  --  17 22*   CREA  --  0.44* 0.69   CA  --  8.8 8.8   MG 2.2  --   --    ALB  --  2.4* 2.5*   TBILI  --  1.2* 1.0   ALT  --  44 52       Recent Labs     05/04/22  0453   CPK 16*         Intake/Output Summary (Last 24 hours) at 5/4/2022 1604  Last data filed at 5/3/2022 2241  Gross per 24 hour   Intake 1000 ml   Output --   Net 1000 ml        Cardiographics    Telemetry: Afib RVR  ECG: Afib RVR  Echocardiogram: 4/22/2022    Left Ventricle: Normal left ventricular systolic function with a visually estimated EF of 60 - 65%. Left ventricle size is normal. Normal wall thickness. Normal wall motion. Normal diastolic function.   Technical qualifiers: Echo study was technically difficult with poor endocardial visualization and technically difficult due to patient's body habitus. CXRAY: \"Improved congestion and pulmonary edema with possible left pleural  Effusion. \"     Assessment:       Principal Problem:    Hip osteomyelitis ([facility] (5/4/2022)    Active Problems:    A-fib ([facility] (11/4/2020)      Mixed hyperlipidemia (11/15/2020)         Plan:   Sakshi Willson is a 57 y.o. female with PMHx of PAF, chronic hypercapneia, hypoxia, untreated JOSLYN, HTN admitted for Hip osteomyelitis ([facility] [M86.9]     Multiple sacral wounds:   Possible UTI:  Diarrhea; Cdiff w/u:  Abx and treatment as per internal medicine     Recurrent Afib RVR:  AVNRT 11/2020 with Hank  EKG and Tele shows Afib RVR  Received one time dose metoprolol in ED 5 mg IV-rate untouched  Give amiodarone bolus and start gtt  Recent ECHO reviewed EF WNL  Admit to hospitalist on tele unit (PCU)  Continue full dose lovenox-was on xeralto will need [address]  Continue BB  Hx prior cardioversions  Patient not taking ASA d/t nosebleeds    Morbid Obesity:  COPD:  JOSLYN:  Chronic hypoxia:  Nebs and treatment as per internal medicine   On baseline O2 4L/NC    HLD:  Continue statin     HTN:   Controlled on BB     Hypokalemia:  Replace K, monitor BMP, keep at 4     Would recommend palliative consult, patient states she would like to remain full code at this time, but also states difficult for daughter to manage her at home. Some care decisions need made.      Thank you for allowing us to partner in the care of our established patient, will follow. Anibal Ugarte NP  CC:Unknown, Provider, MD       Patient seen, examined by me personally. Plan discussed as detailed. Agree with note as outlined by NP with modifications as noted. My independent physical exam reveals : Physical Exam  Vitals and nursing note reviewed. Cardiovascular:      Rate and Rhythm: Irregular rate and regular rhythm. Heart sounds: Normal heart sounds. Pulmonary:      Breath sounds: Normal breath sounds. Musculoskeletal:      B/l chronic venous stasis. Neurological:      Mental Status: He is alert and oriented to person, place, and time. No additional findings noted. Agree with plan as outlined above with modifications as noted. IV BB for rate control. Add IV amio if not controlled. Recommend AC. Morbid obesity is main issue. Recent Echo noted.      Rona Salgado MD

## 2022-05-04 NOTE — PROGRESS NOTES
Received order for OT services. As noted on last admission and previous admits, pt is not appropriate for OT services. She is bedbound at baseline, total care and has hx of limited acceptance of previous attempts at rehab. She is now on bedrest and found to have new hip fx, questionable osteomyelitis.  Will sign off at this time

## 2022-05-04 NOTE — PROGRESS NOTES
Pt seen and examined during my rounds in ER 40  No new complains per pt. Vitals stable, on RA  Awaiting Ortho input on the MRI Findings-- ? Fracture line verus Infection/OM  Will get ID to comment on it as pt has chronic Sacral wound (far from the L hip though)  Cont Current IV Abx Levaquin per admitting MD for UTI  Pt likely needing Placement/Short term rehab-- discussed with pt and pt willing to consider.   K+ 3.3 noted-- replenish K+ PO x 1 now  Follow Blood Cx , Urine Cx  BMP/CBC in AM    DO NOT BILL

## 2022-05-04 NOTE — PROGRESS NOTES
Physical Therapy    Received order for PT. As noted on last admission, pt is not appropriate for PT. She is bedbound at baseline, total care and has hx of limited acceptance of previous attempts at rehab. She is now on bedrest and found to have new hip fx, questionable osteomyelitis. Will sign off at this time.     Sunday Age, PT

## 2022-05-04 NOTE — CONSULTS
ORTHOPAEDIC CONSULT NOTE    Subjective:     Date of Consultation:  May 4, 2022      Urbano Felder is a 57 y.o. female who is being seen for left hip fracture and possible osteomyelitis/infection. Patient has been nonambulatory for greater than 2 years now. Patient presented to the emergency department last night for complaints of diarrhea. Upon work-up with CT of the abdomen and pelvis, the patient was found to have a left displaced femoral neck fracture which appear to be chronic in nature and potential osteomyelitis/infection. According to the patient she states she has been having pain in her left hip for several months now but did not think she needed to pursue it as she does not ambulate at baseline. She complains of pain in her bilateral lower extremity. She denies any recent injuries. She denies any other symptoms at this time. 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    Sciatica 04/21/2022    Hypoxia 04/21/2022    COPD exacerbation ([address] 04/21/2022    CHF exacerbation ([address] 04/21/2022    SOB (shortness of breath) 12/04/2020    Physical deconditioning 12/04/2020    Counseling regarding advance care planning and goals of care 12/04/2020    Atrial flutter with rapid ventricular response ([address] 11/20/2020    JOSLYN (obstructive sleep apnea) 11/20/2020    Acute on chronic respiratory failure with hypoxia and hypercapnia ([address] 11/20/2020    Acute respiratory failure ([address] 11/18/2020    Mixed hyperlipidemia 11/15/2020    Morbid obesity ([address] 11/15/2020    S/P cardiac cath 11/04/2020    COVID-19 ruled out 07/24/2020     No family history on file.    Social History     Tobacco Use    Smoking status: Former Smoker    Smokeless tobacco: Never Used Substance Use Topics    Alcohol use: No     Past Medical History:   Diagnosis Date    Acute on chronic respiratory failure with hypoxia and hypercapnia ([facility]) 11/20/2020    Atrial fibrillation ([facility] 11/4/2020    CHF (congestive heart failure) ([facility])     Diabetes ([facility] [address]     Hypertension     Ill-defined condition     high cholesterol    Ill-defined condition     tachycardia    JOSLYN (obstructive sleep apnea) 11/20/2020    Other and unspecified symptoms and signs involving general sensations and perceptions     ruptured disc    S/P cardiac cath 11/4/2020    11/3/2020 nonobstructive disease      Past Surgical History:   Procedure Laterality Date    GA COMPRE ELECTROPHYSIOL XM W/LEFT ATRIAL PACNG/REC N/A 11/23/2020    Lt Atrial Pace & Record During Ep Study performed by Mallory Patterson MD at [facility] CARDIAC CATH LAB    GA COMPRE EP EVAL ABLTJ 3D MAPG TX SVT N/A 11/23/2020    ABLATION A-FLUTTER performed by Mallory Patterson MD at [facility] CARDIAC CATH LAB   [facility] 1 ADD ON N/A 11/23/2020    Ablation Svt/Vt Add On performed by Mallory Patterson MD at [facility] CARDIAC CATH LAB    GA INTRACARDIAC ELECTROPHYSIOLOGIC 3D MAPPING N/A 11/23/2020    Ep 3d Mapping performed by Mallory Patterson MD at [facility] CARDIAC CATH LAB    GA STIM/PACING HEART POST IV DRUG INFU N/A 11/23/2020    Drug Stimulation performed by Mallory Patterson MD at [facility]      Prior to Admission medications    Medication Sig Start Date End Date Taking? Authorizing Provider   predniSONE (DELTASONE) 10 mg tablet 4 tabs daily for 2 days, then 3 tabs daily for 2 days , then 2 tabs daily for 2 days, then 1 tab daily for 2 days 4/27/22   Kiran Patton MD   albuterol-ipratropium (DUO-NEB) 2.5 mg-0.5 mg/3 ml nebu 3 mL by Nebulization route every four (4) hours as needed for Wheezing, Shortness of Breath or Respiratory Distress.  12/8/20   Delroy Calero MD   acetaminophen (TYLENOL) 325 mg tablet Take 650 mg by mouth every six (6) hours as needed for Pain. Provider, Chun   furosemide (LASIX) 40 mg tablet Take 1 Tab by mouth daily. Patient taking differently: Take 20 mg by mouth daily. Takes prn when fluid overloaded 8/7/20   Juan Jose Mclain MD   atorvastatin (LIPITOR) 10 mg tablet Take 10 mg by mouth nightly. Russ Cano MD   metoprolol (LOPRESSOR) 25 mg tablet Take 25 mg by mouth two (2) times a day.     Russ Cano MD     Current Facility-Administered Medications   Medication Dose Route Frequency    sodium chloride (NS) flush 5-40 mL  5-40 mL IntraVENous Q8H    sodium chloride (NS) flush 5-40 mL  5-40 mL IntraVENous PRN    acetaminophen (TYLENOL) tablet 650 mg  650 mg Oral Q6H PRN    Or    acetaminophen (TYLENOL) suppository 650 mg  650 mg Rectal Q6H PRN    polyethylene glycol (MIRALAX) packet 17 g  17 g Oral DAILY    bisacodyL (DULCOLAX) tablet 5 mg  5 mg Oral DAILY PRN    promethazine (PHENERGAN) tablet 12.5 mg  12.5 mg Oral Q6H PRN    Or    ondansetron (ZOFRAN) injection 4 mg  4 mg IntraVENous Q6H PRN    oxyCODONE IR (ROXICODONE) tablet 5-10 mg  5-10 mg Oral Q6H PRN    albuterol-ipratropium (DUO-NEB) 2.5 MG-0.5 MG/3 ML  3 mL Nebulization QID RT    atorvastatin (LIPITOR) tablet 10 mg  10 mg Oral QHS    metoprolol tartrate (LOPRESSOR) tablet 25 mg  25 mg Oral BID    [Held by provider] furosemide (LASIX) tablet 40 mg  40 mg Oral DAILY    levoFLOXacin (LEVAQUIN) 750 mg in D5W IVPB  750 mg IntraVENous Q24H    sodium chloride 0.9 % bolus infusion 500 mL  500 mL IntraVENous ONCE    0.9% sodium chloride infusion  100 mL/hr IntraVENous CONTINUOUS    enoxaparin (LOVENOX) injection 30 mg  30 mg SubCUTAneous Q12H    magnesium sulfate 1 g/100 ml IVPB (premix or compounded)  1 g IntraVENous ONCE    acetaminophen (TYLENOL) tablet 650 mg  650 mg Oral Q6H PRN     Current Outpatient Medications   Medication Sig    predniSONE (DELTASONE) 10 mg tablet 4 tabs daily for 2 days, then 3 tabs daily for 2 days , then 2 tabs daily for 2 days, then 1 tab daily for 2 days    albuterol-ipratropium (DUO-NEB) 2.5 mg-0.5 mg/3 ml nebu 3 mL by Nebulization route every four (4) hours as needed for Wheezing, Shortness of Breath or Respiratory Distress.  acetaminophen (TYLENOL) 325 mg tablet Take 650 mg by mouth every six (6) hours as needed for Pain.  furosemide (LASIX) 40 mg tablet Take 1 Tab by mouth daily. (Patient taking differently: Take 20 mg by mouth daily. Takes prn when fluid overloaded)    atorvastatin (LIPITOR) 10 mg tablet Take 10 mg by mouth nightly.  metoprolol (LOPRESSOR) 25 mg tablet Take 25 mg by mouth two (2) times a day. Allergies   Allergen Reactions    Cefazolin Rash    Other Plant, Animal, Environmental Rash     Ivory soap caused rash on hands. Review of Systems:  A comprehensive review of systems was negative except for that written in the HPI. Mental Status: Alert and oriented     Objective:     Patient Vitals for the past 8 hrs:   BP Temp Pulse Resp SpO2   22 1051 -- -- -- -- 97 %   22 0959 107/66 98.9 °F (37.2 °C) (!) 101 23 95 %   22 0945 (!) 138/56 98.8 °F (37.1 °C) -- -- 95 %     Temp (24hrs), Av.6 °F (37 °C), Min:98.2 °F (36.8 °C), Max:98.9 °F (37.2 °C)      Gen: Morbidly obese,  in no acute distress   HEENT: Pink conjunctivae, hearing intact to voice, moist mucous membranes   Neck: Supple  Resp: No respiratory distress   Card: palpable distal pulse-equal bilaterally, brisk cap refill all distal digits   Abd: non-distended  Musc: Inspection of the bilateral lower extremity reveals externally rotated left lower extremity and flexed at the knee. Palpation elicits diffuse pain throughout the bilateral lower extremity for the patient, but all compartments are soft to the touch. Increased pain to palpation at the area of the left left hip. Patient is able to perform active range of motion of the bilateral ankles and feet.   Any active range of motion of the bilateral knees or hips elicits pain for the patient as well as with passive range of motion. 5/5 strength bilateral ankles. 5/5 strength of the bilateral upper extremity. Neuro: Cranial nerves are grossly intact, no focal motor weakness, follows commands appropriately. Sensation to light touch intact bilateral lower extremity. Psych: Good insight, oriented to person, place and time, alert    Imaging Review: EXAM: CT ABD PELV W CONT     INDICATION: multiple decubitus ulcers, eval for associated abscess/osteo      COMPARISON: Abdominal radiograph 6/25/2020      CONTRAST: 100 mL of Isovue-370.     ORAL CONTRAST: None     TECHNIQUE:   Following the uneventful intravenous administration of contrast, thin axial  images were obtained through the abdomen and pelvis. Coronal and sagittal  reconstructions were generated. CT dose reduction was achieved through use of a  standardized protocol tailored for this examination and automatic exposure  control for dose modulation.     FINDINGS:   LOWER THORAX: Cardiomegaly. Patchy atelectasis/scarring in the lung bases  LIVER: No mass. BILIARY TREE: Gallbladder is within normal limits. CBD is not dilated. SPLEEN: within normal limits. PANCREAS: Atrophic. No mass or ductal dilatation. ADRENALS: Unremarkable. KIDNEYS:  Several nonobstructing bilateral calculi measuring up to 10 mm in the  right upper pole. No hydronephrosis. No mass. Subcentimeter left upper pole  hypodensity which is too small to accurately characterize; no dedicated  follow-up recommended  STOMACH: Unremarkable. SMALL BOWEL: No dilatation or wall thickening. COLON: No dilatation or wall thickening. APPENDIX: ? Partial appendectomy  PERITONEUM: No ascites or pneumoperitoneum. RETROPERITONEUM: No lymphadenopathy or aortic aneurysm. Atherosclerosis. REPRODUCTIVE ORGANS: Uterus and adnexa are within normal limits  URINARY BLADDER: No mass or calculus.   BONES: Displaced left femoral neck fracture deformity with aggressive erosive  changes and sclerosis along the fracture margin (052-53)  ABDOMINAL WALL: No mass or hernia. ADDITIONAL COMMENTS: Anusha mesentery (060-33).    IMPRESSION  1. Displaced left femoral neck fracture deformity with aggressive erosive  changes and sclerosis along the fracture margin (230-54). Consider acute  osteomyelitis and/or septic arthritis given the clinical presentation. 2.  Bilateral nonobstructing nephrolithiasis. 3.  Anusha mesentery, nonspecific finding which can be seen with enteritis  etiologies (e.g., mesenteric panniculitis, enteritis, pancreatitis, etc.)       Labs:   Recent Results (from the past 24 hour(s))   CBC WITH AUTOMATED DIFF    Collection Time: 05/03/22  4:00 PM   Result Value Ref Range    WBC 16.4 (H) 3.6 - 11.0 K/uL    RBC 3.88 3.80 - 5.20 M/uL    HGB 12.2 11.5 - 16.0 g/dL    HCT 39.8 35.0 - 47.0 %    .6 (H) 80.0 - 99.0 FL    MCH 31.4 26.0 - 34.0 PG    MCHC 30.7 30.0 - 36.5 g/dL    RDW 16.3 (H) 11.5 - 14.5 %    PLATELET 532 (L) 648 - 400 K/uL    MPV 12.3 8.9 - 12.9 FL    NRBC 0.0 0  WBC    ABSOLUTE NRBC 0.00 0.00 - 0.01 K/uL    NEUTROPHILS 87 (H) 32 - 75 %    LYMPHOCYTES 6 (L) 12 - 49 %    MONOCYTES 5 5 - 13 %    EOSINOPHILS 1 0 - 7 %    BASOPHILS 0 0 - 1 %    IMMATURE GRANULOCYTES 1 (H) 0.0 - 0.5 %    ABS. NEUTROPHILS 14.3 (H) 1.8 - 8.0 K/UL    ABS. LYMPHOCYTES 0.9 0.8 - 3.5 K/UL    ABS. MONOCYTES 0.9 0.0 - 1.0 K/UL    ABS. EOSINOPHILS 0.1 0.0 - 0.4 K/UL    ABS. BASOPHILS 0.0 0.0 - 0.1 K/UL    ABS. IMM.  GRANS. 0.1 (H) 0.00 - 0.04 K/UL    DF AUTOMATED     METABOLIC PANEL, COMPREHENSIVE    Collection Time: 05/03/22  4:00 PM   Result Value Ref Range    Sodium 136 136 - 145 mmol/L    Potassium 3.6 3.5 - 5.1 mmol/L    Chloride 97 97 - 108 mmol/L    CO2 38 (H) 21 - 32 mmol/L    Anion gap 1 (L) 5 - 15 mmol/L    Glucose 234 (H) 65 - 100 mg/dL    BUN 22 (H) 6 - 20 MG/DL    Creatinine 0.69 0.55 - 1.02 MG/DL    BUN/Creatinine ratio 32 (H) 12 - 20 GFR est AA >60 >60 ml/min/1.73m2    GFR est non-AA >60 >60 ml/min/1.73m2    Calcium 8.8 8.5 - 10.1 MG/DL    Bilirubin, total 1.0 0.2 - 1.0 MG/DL    ALT (SGPT) 52 12 - 78 U/L    AST (SGOT) 68 (H) 15 - 37 U/L    Alk.  phosphatase 268 (H) 45 - 117 U/L    Protein, total 6.4 6.4 - 8.2 g/dL    Albumin 2.5 (L) 3.5 - 5.0 g/dL    Globulin 3.9 2.0 - 4.0 g/dL    A-G Ratio 0.6 (L) 1.1 - 2.2     EKG, 12 LEAD, INITIAL    Collection Time: 05/03/22  5:09 PM   Result Value Ref Range    Ventricular Rate 85 BPM    Atrial Rate 78 BPM    P-R Interval 152 ms    QRS Duration 82 ms    Q-T Interval 400 ms    QTC Calculation (Bezet) 476 ms    Calculated P Axis 62 degrees    Calculated R Axis 66 degrees    Calculated T Axis 88 degrees    Diagnosis       Sinus rhythm with marked sinus arrhythmia  Nonspecific ST abnormality  When compared with ECG of 21-APR-2022 01:37,  No significant change was found  Confirmed by [facility] ([id number]) on 5/4/2022 10:29:38 AM     POC LACTIC ACID    Collection Time: 05/03/22  6:00 PM   Result Value Ref Range    Lactic Acid (POC) 3.69 (HH) 0.40 - 2.00 mmol/L   CULTURE, BLOOD, PAIRED    Collection Time: 05/03/22  6:41 PM    Specimen: Blood   Result Value Ref Range    Special Requests: NO SPECIAL REQUESTS      Culture result: NO GROWTH AFTER 12 HOURS     SED RATE (ESR)    Collection Time: 05/03/22  6:41 PM   Result Value Ref Range    Sed rate, automated 26 0 - 30 mm/hr   CRP, HIGH SENSITIVITY    Collection Time: 05/03/22  6:41 PM   Result Value Ref Range    CRP, High sensitivity >9.5 mg/L   URINALYSIS W/ REFLEX CULTURE    Collection Time: 05/03/22  6:41 PM    Specimen: Urine   Result Value Ref Range    Color DARK YELLOW      Appearance TURBID (A) CLEAR      Specific gravity 1.010 1.003 - 1.030      pH (UA) 7.0 5.0 - 8.0      Protein 30 (A) NEG mg/dL    Glucose Negative NEG mg/dL    Ketone Negative NEG mg/dL    Bilirubin Negative NEG      Blood MODERATE (A) NEG      Urobilinogen 1.0 0.2 - 1.0 EU/dL    Nitrites Positive (A) NEG      Leukocyte Esterase LARGE (A) NEG      WBC 20-50 0 - 4 /hpf    RBC 0-5 0 - 5 /hpf    Epithelial cells FEW FEW /lpf    Bacteria 1+ (A) NEG /hpf    UA:UC IF INDICATED URINE CULTURE ORDERED (A) CNI      CA Oxalate crystals 2+ (A) NEG   POC LACTIC ACID    Collection Time: 05/03/22 10:21 PM   Result Value Ref Range    Lactic Acid (POC) 1.52 0.40 - 6.79 mmol/L   METABOLIC PANEL, COMPREHENSIVE    Collection Time: 05/04/22  4:53 AM   Result Value Ref Range    Sodium 138 136 - 145 mmol/L    Potassium 3.3 (L) 3.5 - 5.1 mmol/L    Chloride 99 97 - 108 mmol/L    CO2 35 (H) 21 - 32 mmol/L    Anion gap 4 (L) 5 - 15 mmol/L    Glucose 152 (H) 65 - 100 mg/dL    BUN 17 6 - 20 MG/DL    Creatinine 0.44 (L) 0.55 - 1.02 MG/DL    BUN/Creatinine ratio 39 (H) 12 - 20      GFR est AA >60 >60 ml/min/1.73m2    GFR est non-AA >60 >60 ml/min/1.73m2    Calcium 8.8 8.5 - 10.1 MG/DL    Bilirubin, total 1.2 (H) 0.2 - 1.0 MG/DL    ALT (SGPT) 44 12 - 78 U/L    AST (SGOT) 45 (H) 15 - 37 U/L    Alk.  phosphatase 290 (H) 45 - 117 U/L    Protein, total 6.2 (L) 6.4 - 8.2 g/dL    Albumin 2.4 (L) 3.5 - 5.0 g/dL    Globulin 3.8 2.0 - 4.0 g/dL    A-G Ratio 0.6 (L) 1.1 - 2.2     PROCALCITONIN    Collection Time: 05/04/22  4:53 AM   Result Value Ref Range    Procalcitonin 1.41 ng/mL   C REACTIVE PROTEIN, QT    Collection Time: 05/04/22  4:53 AM   Result Value Ref Range    C-Reactive protein 8.89 (H) 0.00 - 0.60 mg/dL   LIPASE    Collection Time: 05/04/22  4:53 AM   Result Value Ref Range    Lipase 123 73 - 393 U/L   CK    Collection Time: 05/04/22  4:53 AM   Result Value Ref Range    CK 16 (L) 26 - 192 U/L   TROPONIN-HIGH SENSITIVITY    Collection Time: 05/04/22  4:53 AM   Result Value Ref Range    Troponin-High Sensitivity 32 0 - 51 ng/L   MAGNESIUM    Collection Time: 05/04/22  5:14 AM   Result Value Ref Range    Magnesium 2.2 1.6 - 2.4 mg/dL         Impression:     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    Sciatica 04/21/2022    Hypoxia 04/21/2022    COPD exacerbation ([address] 04/21/2022    CHF exacerbation ([address] 04/21/2022    SOB (shortness of breath) 12/04/2020    Physical deconditioning 12/04/2020    Counseling regarding advance care planning and goals of care 12/04/2020    Atrial flutter with rapid ventricular response ([address] 11/20/2020    JOSLYN (obstructive sleep apnea) 11/20/2020    Acute on chronic respiratory failure with hypoxia and hypercapnia ([address] 11/20/2020    Acute respiratory failure ([address] 11/18/2020    Mixed hyperlipidemia 11/15/2020    Morbid obesity ([address] 11/15/2020    S/P cardiac cath 11/04/2020    COVID-19 ruled out 07/24/2020     Active Problems:    Hip osteomyelitis ([address] (5/4/2022)        Plan:   Chronic displaced femoral neck fracture of the left hip  Possible Osteomyelitis of the left hip  Nonambulatory for greater than 2 years    -No plans for surgical intervention for patient's left hip chronic fracture. -Recommend IV antibiotics for potential osteomyelitis/infection. Potentially result of the patient's left decubital ulcer.  -If beneficial from infectious disease standpoint, interventional radiology could potentially biopsy/aspirate this area for guided treatment. Dr. Yovana Fitzpatrick aware and agrees with plan as above.         COREEN Peterson  [employer]

## 2022-05-05 ENCOUNTER — APPOINTMENT (OUTPATIENT)
Dept: CT IMAGING | Age: 62
DRG: 477 | End: 2022-05-05
Attending: GENERAL ACUTE CARE HOSPITAL
Payer: MEDICARE

## 2022-05-05 LAB
ANION GAP SERPL CALC-SCNC: 4 MMOL/L (ref 5–15)
BACTERIA SPEC CULT: NORMAL
BUN SERPL-MCNC: 13 MG/DL (ref 6–20)
BUN/CREAT SERPL: 28 (ref 12–20)
CALCIUM SERPL-MCNC: 8 MG/DL (ref 8.5–10.1)
CC UR VC: NORMAL
CHLORIDE SERPL-SCNC: 91 MMOL/L (ref 97–108)
CO2 SERPL-SCNC: 33 MMOL/L (ref 21–32)
COMMENT, HOLDF: NORMAL
CREAT SERPL-MCNC: 0.47 MG/DL (ref 0.55–1.02)
ERYTHROCYTE [DISTWIDTH] IN BLOOD BY AUTOMATED COUNT: 16.6 % (ref 11.5–14.5)
GLUCOSE SERPL-MCNC: 422 MG/DL (ref 65–100)
HCT VFR BLD AUTO: 32.8 % (ref 35–47)
HGB BLD-MCNC: 9.8 G/DL (ref 11.5–16)
MCH RBC QN AUTO: 31.5 PG (ref 26–34)
MCHC RBC AUTO-ENTMCNC: 29.9 G/DL (ref 30–36.5)
MCV RBC AUTO: 105.5 FL (ref 80–99)
NRBC # BLD: 0.02 K/UL (ref 0–0.01)
NRBC BLD-RTO: 0.2 PER 100 WBC
PLATELET # BLD AUTO: 125 K/UL (ref 150–400)
PMV BLD AUTO: 12.2 FL (ref 8.9–12.9)
POTASSIUM SERPL-SCNC: 3.3 MMOL/L (ref 3.5–5.1)
RBC # BLD AUTO: 3.11 M/UL (ref 3.8–5.2)
SAMPLES BEING HELD,HOLD: NORMAL
SERVICE CMNT-IMP: NORMAL
SODIUM SERPL-SCNC: 128 MMOL/L (ref 136–145)
WBC # BLD AUTO: 10.8 K/UL (ref 3.6–11)

## 2022-05-05 PROCEDURE — 99152 MOD SED SAME PHYS/QHP 5/>YRS: CPT

## 2022-05-05 PROCEDURE — 87205 SMEAR GRAM STAIN: CPT

## 2022-05-05 PROCEDURE — 74011000250 HC RX REV CODE- 250: Performed by: INTERNAL MEDICINE

## 2022-05-05 PROCEDURE — 74011250636 HC RX REV CODE- 250/636: Performed by: NURSE PRACTITIONER

## 2022-05-05 PROCEDURE — 0Q973ZX DRAINAGE OF LEFT UPPER FEMUR, PERCUTANEOUS APPROACH, DIAGNOSTIC: ICD-10-PCS | Performed by: STUDENT IN AN ORGANIZED HEALTH CARE EDUCATION/TRAINING PROGRAM

## 2022-05-05 PROCEDURE — 2709999900 HC NON-CHARGEABLE SUPPLY

## 2022-05-05 PROCEDURE — 74011250637 HC RX REV CODE- 250/637: Performed by: GENERAL ACUTE CARE HOSPITAL

## 2022-05-05 PROCEDURE — 74011250636 HC RX REV CODE- 250/636: Performed by: INTERNAL MEDICINE

## 2022-05-05 PROCEDURE — 65270000046 HC RM TELEMETRY

## 2022-05-05 PROCEDURE — 87075 CULTR BACTERIA EXCEPT BLOOD: CPT

## 2022-05-05 PROCEDURE — 74011250636 HC RX REV CODE- 250/636: Performed by: STUDENT IN AN ORGANIZED HEALTH CARE EDUCATION/TRAINING PROGRAM

## 2022-05-05 PROCEDURE — 36415 COLL VENOUS BLD VENIPUNCTURE: CPT

## 2022-05-05 PROCEDURE — 74011250637 HC RX REV CODE- 250/637: Performed by: INTERNAL MEDICINE

## 2022-05-05 PROCEDURE — 85027 COMPLETE CBC AUTOMATED: CPT

## 2022-05-05 PROCEDURE — 94640 AIRWAY INHALATION TREATMENT: CPT

## 2022-05-05 PROCEDURE — 77010033678 HC OXYGEN DAILY

## 2022-05-05 PROCEDURE — 80048 BASIC METABOLIC PNL TOTAL CA: CPT

## 2022-05-05 PROCEDURE — 74011000258 HC RX REV CODE- 258: Performed by: NURSE PRACTITIONER

## 2022-05-05 RX ORDER — BALSAM PERU/CASTOR OIL
OINTMENT (GRAM) TOPICAL 2 TIMES DAILY
Status: DISCONTINUED | OUTPATIENT
Start: 2022-05-05 | End: 2022-05-17 | Stop reason: HOSPADM

## 2022-05-05 RX ORDER — POTASSIUM CHLORIDE 750 MG/1
40 TABLET, FILM COATED, EXTENDED RELEASE ORAL
Status: COMPLETED | OUTPATIENT
Start: 2022-05-05 | End: 2022-05-05

## 2022-05-05 RX ORDER — MIDAZOLAM HYDROCHLORIDE 1 MG/ML
5 INJECTION, SOLUTION INTRAMUSCULAR; INTRAVENOUS
Status: DISCONTINUED | OUTPATIENT
Start: 2022-05-05 | End: 2022-05-05

## 2022-05-05 RX ORDER — FENTANYL CITRATE 50 UG/ML
100 INJECTION, SOLUTION INTRAMUSCULAR; INTRAVENOUS
Status: DISCONTINUED | OUTPATIENT
Start: 2022-05-05 | End: 2022-05-05

## 2022-05-05 RX ADMIN — AMIODARONE HYDROCHLORIDE 0.5 MG/MIN: 50 INJECTION, SOLUTION INTRAVENOUS at 02:04

## 2022-05-05 RX ADMIN — IPRATROPIUM BROMIDE AND ALBUTEROL SULFATE 3 ML: .5; 3 SOLUTION RESPIRATORY (INHALATION) at 07:18

## 2022-05-05 RX ADMIN — FENTANYL CITRATE 50 MCG: 50 INJECTION, SOLUTION INTRAMUSCULAR; INTRAVENOUS at 15:25

## 2022-05-05 RX ADMIN — MIDAZOLAM HYDROCHLORIDE 1 MG: 1 INJECTION, SOLUTION INTRAMUSCULAR; INTRAVENOUS at 15:48

## 2022-05-05 RX ADMIN — ACETAMINOPHEN 325MG 650 MG: 325 TABLET ORAL at 21:34

## 2022-05-05 RX ADMIN — IPRATROPIUM BROMIDE AND ALBUTEROL SULFATE 3 ML: .5; 3 SOLUTION RESPIRATORY (INHALATION) at 12:31

## 2022-05-05 RX ADMIN — Medication: at 17:41

## 2022-05-05 RX ADMIN — FENTANYL CITRATE 25 MCG: 50 INJECTION, SOLUTION INTRAMUSCULAR; INTRAVENOUS at 15:45

## 2022-05-05 RX ADMIN — SODIUM CHLORIDE, PRESERVATIVE FREE 10 ML: 5 INJECTION INTRAVENOUS at 21:35

## 2022-05-05 RX ADMIN — MIDAZOLAM HYDROCHLORIDE 1 MG: 1 INJECTION, SOLUTION INTRAMUSCULAR; INTRAVENOUS at 15:40

## 2022-05-05 RX ADMIN — MICONAZOLE NITRATE: 20 CREAM TOPICAL at 08:33

## 2022-05-05 RX ADMIN — OXYCODONE 10 MG: 5 TABLET ORAL at 17:44

## 2022-05-05 RX ADMIN — MIDAZOLAM HYDROCHLORIDE 2 MG: 1 INJECTION, SOLUTION INTRAMUSCULAR; INTRAVENOUS at 15:30

## 2022-05-05 RX ADMIN — POTASSIUM CHLORIDE 40 MEQ: 750 TABLET, EXTENDED RELEASE ORAL at 10:34

## 2022-05-05 RX ADMIN — FENTANYL CITRATE 25 MCG: 50 INJECTION, SOLUTION INTRAMUSCULAR; INTRAVENOUS at 15:50

## 2022-05-05 RX ADMIN — Medication: at 21:35

## 2022-05-05 RX ADMIN — SODIUM CHLORIDE, PRESERVATIVE FREE 10 ML: 5 INJECTION INTRAVENOUS at 06:38

## 2022-05-05 RX ADMIN — Medication: at 08:33

## 2022-05-05 RX ADMIN — OXYCODONE 10 MG: 5 TABLET ORAL at 10:34

## 2022-05-05 RX ADMIN — IPRATROPIUM BROMIDE AND ALBUTEROL SULFATE 3 ML: .5; 3 SOLUTION RESPIRATORY (INHALATION) at 19:21

## 2022-05-05 RX ADMIN — LEVOFLOXACIN 750 MG: 5 INJECTION, SOLUTION INTRAVENOUS at 06:37

## 2022-05-05 RX ADMIN — ACETAMINOPHEN 325MG 650 MG: 325 TABLET ORAL at 08:32

## 2022-05-05 RX ADMIN — OXYCODONE 10 MG: 5 TABLET ORAL at 03:27

## 2022-05-05 RX ADMIN — METOPROLOL TARTRATE 25 MG: 25 TABLET, FILM COATED ORAL at 17:41

## 2022-05-05 RX ADMIN — SODIUM CHLORIDE 100 ML/HR: 9 INJECTION, SOLUTION INTRAVENOUS at 03:25

## 2022-05-05 RX ADMIN — MICONAZOLE NITRATE: 20 CREAM TOPICAL at 21:35

## 2022-05-05 RX ADMIN — METOPROLOL TARTRATE 25 MG: 25 TABLET, FILM COATED ORAL at 08:32

## 2022-05-05 RX ADMIN — ATORVASTATIN CALCIUM 10 MG: 10 TABLET, FILM COATED ORAL at 21:34

## 2022-05-05 RX ADMIN — AMIODARONE HYDROCHLORIDE 0.5 MG/MIN: 50 INJECTION, SOLUTION INTRAVENOUS at 14:35

## 2022-05-05 NOTE — PROGRESS NOTES
Transition of Care Plan:     RUR:  17% - moderate risk  Disposition:  [facility]  - will need rapid COVID prior to admission. Follow up appointments:  PCP; Specialist  DME needed:  SNF will provide. Transportation at Discharge:  CM to arrange. Keys or means to access home:  vj      IM Medicare Letter: To be given closer to dc  Is patient a BCPI-A Bundle:  No                  If yes, was Bundle Letter given?: N/A  Is patient a [place] and connected with the [address]? No         If yes, was [place] transfer form completed and [facility] notified? Caregiver Contact:  Enma Caballero, daughter, [phone]  Discharge Caregiver contacted prior to discharge? Yes  Care Conference needed? Not at this time. Updated Note 3:55 pm: CM received phone call from Dexter Martinez at [facility]. States that SNF is a good choice for disposition. States that patient and caregivers / family could not care for her. At one time, they had 3 staff members having to turn her and it was difficult. The [facility] RN had contacted [facility] and filed a report because pt needs more assistance. Initial Note 3:10 pm: [facility] has accepted. CM had received a phone call from Mariano at [facility] earlier. CM called back and left VM. In review of chart, pt is not medically stable for discharge. CM contacted Enma Caballero dtr, to update. Unit CM will continue to follow.      Silas Price, RN, BSN, [id number] [facility] Care Manager  [phone]

## 2022-05-05 NOTE — WOUND CARE
Wound Care consult for \" Wounds\" that were present on admission. I took care of this patient only a week ago and she was just discharged. There were initial concerns about 10 days ago of safe discharge to her home and forensics was called but patient refused to have them do any work up for it. Now home nursing is saying the same thing about her \"living conditions\"   Pt.'s daughter is not working and patient designates her as her caregiver. She no longer thinks that her daughter can handle her care. Pt. Often stays on her sides and cannot move herself at all for many hours. Pt. Has severe knee pain due to bone on bone knee joints - this is the reason that she is bed bound. Pt. Stated, \"I am 100 lbs overweight in order for them to do a knee surgery\", she has been told. Now with left hip pain POA -CT scan showed displaced femoral neck Fracture. Orthopedics weighing in on the patient's care. Assessment: Overall patient condition appears improved. Her breathing is better and her skin, wounds are improving some but she continues to have slow healing due to her weight / re-injury of the skin And anticoagulants that cause constant bleeding. The Left buttocks wound is much improved from her last visit here. Granulation tissue throughout the wound bed but it bleeds when cleansed and needs to have pressure held on it for about a minute. The skin on the sacrum (the DTI's from the last admission) are now breaking down / evolving into what is now \"Unstageable Pressure injuries covered with black Eschar. The upper and mid back wounds / scarring / Friction wounds are about the same. The skin is dark but smooth and non-blanchable (Dyschromia with chronic skin changes). Patient is incontinent of urine and stool. Her buttocks and upper posterior thighs have bleeding abrasions on the skin. No dressing will stay on it due to the wet skin.    Two views of the buttocks and posterior thigh abrasions:        Left Buttocks wound:        Sacrum wound:         Upper back:        Treatment: Pt. Is on a [employer] [facility] Bed rental.  Discussed the wound care as we did it today with nursing and the left buttocks wound care was done. Venelex ordered for some wounds - upper back and linear hip and thigh wounds. Other wound care ordered with Secura for the skin under the breasts and in the abdominal folds to treat and protect the skin from Yeast.   Foot care and leg care to be done with bathing and then the Lac-Hydrin lotion.    Serenity Turner, RN, BSN, [facility]

## 2022-05-05 NOTE — PROGRESS NOTES
[facility]  [address]  [address] [facility] [address], [address] [facility]  [phone]  [facility] [employer]  CARDIOLOGY PROGRESS NOTE    5/5/2022 11:40 AM    Admit Date: 5/3/2022    Admit Diagnosis:   Hip osteomyelitis ([address] [M86.9]    Subjective: Avelino Hendricks is back in NSR. No cardiac complaints    VSS.  Now NSR   Replace K, 3.3    Visit Vitals  BP (!) 131/51 (BP 1 Location: Left lower arm, BP Patient Position: At rest)   Pulse 80   Temp 97.8 °F (36.6 °C)   Resp 23   Ht 5' 3\" (1.6 m)   Wt 133.3 kg (293 lb 14 oz)   SpO2 99%   BMI 52.06 kg/m²       Current Facility-Administered Medications   Medication Dose Route Frequency    sodium chloride (NS) flush 5-40 mL  5-40 mL IntraVENous Q8H    sodium chloride (NS) flush 5-40 mL  5-40 mL IntraVENous PRN    acetaminophen (TYLENOL) tablet 650 mg  650 mg Oral Q6H PRN    Or    acetaminophen (TYLENOL) suppository 650 mg  650 mg Rectal Q6H PRN    polyethylene glycol (MIRALAX) packet 17 g  17 g Oral DAILY    bisacodyL (DULCOLAX) tablet 5 mg  5 mg Oral DAILY PRN    promethazine (PHENERGAN) tablet 12.5 mg  12.5 mg Oral Q6H PRN    Or    ondansetron (ZOFRAN) injection 4 mg  4 mg IntraVENous Q6H PRN    oxyCODONE IR (ROXICODONE) tablet 5-10 mg  5-10 mg Oral Q6H PRN    albuterol-ipratropium (DUO-NEB) 2.5 MG-0.5 MG/3 ML  3 mL Nebulization QID RT    atorvastatin (LIPITOR) tablet 10 mg  10 mg Oral QHS    metoprolol tartrate (LOPRESSOR) tablet 25 mg  25 mg Oral BID    [Held by provider] furosemide (LASIX) tablet 40 mg  40 mg Oral DAILY    [Held by provider] levoFLOXacin (LEVAQUIN) 750 mg in D5W IVPB  750 mg IntraVENous Q24H    [Held by provider] 0.9% sodium chloride infusion  100 mL/hr IntraVENous CONTINUOUS    miconazole (SECURA) 2 % extra thick cream   Topical BID    ammonium lactate (LAC-HYDRIN) 12 % lotion   Topical BID    [Held by provider] enoxaparin (LOVENOX) injection 135 mg  135 mg SubCUTAneous Q12H    amiodarone (CORDARONE) 375 mg/250 mL D5W infusion  0.5 mg/min IntraVENous CONTINUOUS         Objective:      Physical Exam  General Appearance:  alert, cooperative, morbidly obese  female in NAD, appears older than stated age   Eyes: sclera anicteric  Mouth/Throat: moist mucous membranes; oral pharynx clear  Neck: supple; no JVD or bruit  Pulmonary:  Diminished to auscultation bilaterally; good effort  Cardiovascular: regular rhythm and rate; no murmur, click, rub, or gallop  Abdomen: soft, non-tender, non-distended; bowel sounds normal  Musculoskeletal: no swelling or deformity; moves all extremities  Extremities: 3+ edema; palpable distal pulses   Skin: thick, leathery skin to lower extremities, sores, flaky    Neuro: grossly normal  Psych: normal mood and affect given the setting      Data Review:   Recent Labs     05/05/22 0341 05/03/22  1600   WBC 10.8 16.4*   HGB 9.8* 12.2   HCT 32.8* 39.8   * 138*     Recent Labs     05/05/22  0341 05/04/22  0514 05/04/22  0453 05/03/22  1600   *  --  138 136   K 3.3*  --  3.3* 3.6   CL 91*  --  99 97   CO2 33*  --  35* 38*   *  --  152* 234*   BUN 13  --  17 22*   CREA 0.47*  --  0.44* 0.69   CA 8.0*  --  8.8 8.8   MG  --  2.2  --   --    ALB  --   --  2.4* 2.5*   TBILI  --   --  1.2* 1.0   ALT  --   --  44 52       Recent Labs     05/04/22  0453   CPK 16*         Intake/Output Summary (Last 24 hours) at 5/5/2022 1140  Last data filed at 5/5/2022 0400  Gross per 24 hour   Intake 3126 ml   Output 150 ml   Net 2976 ml      Cardiographics     Telemetry: NSR   ECG: Afib RVR  Echocardiogram: 4/22/2022    Left Ventricle: Normal left ventricular systolic function with a visually estimated EF of 60 - 65%. Left ventricle size is normal. Normal wall thickness. Normal wall motion. Normal diastolic function.     Technical qualifiers: Echo study was technically difficult with poor endocardial visualization and technically difficult due to patient's body habitus.     CXRAY: \"Improved congestion and pulmonary edema with possible left pleural  Effusion. \"    Assessment:     Principal Problem:    Hip osteomyelitis ([facility] (5/4/2022)    Active Problems:    A-fib ([facility] (11/4/2020)      Mixed hyperlipidemia (11/15/2020)        Plan:   Lincoln Mcpherson is a 57 y.o. female with PMHx of PAF, chronic hypercapneia, hypoxia, untreated JOSLYN, HTN admitted for Hip osteomyelitis ([facility] [M86.9]      Multiple sacral wounds:   Possible UTI:  Diarrhea; Cdiff w/u:  Abx and treatment as per internal medicine      Recurrent Afib RVR:  AVNRT 11/2020 with Cande Lindsay  Now NSR  Received one time dose metoprolol in ED 5 mg IV-rate untouched  Given amiodarone bolus and started gtt  Continue amio gtt for now   Recent ECHO reviewed EF WNL  Admitted to hospitalist on tele unit (PCU)  Continue full dose lovenox-was on xeralto will need OAC-currently on hold according to patient she is for biopsy today? Continue BB  Hx prior cardioversions  Patient not taking ASA d/t nosebleeds     Morbid Obesity:  COPD:  JOSLYN:  Chronic hypoxia:  Nebs and treatment as per internal medicine   On baseline O2 4L/NC     HLD:  Continue statin     HTN:   Controlled on BB      Hypokalemia:  Replace K, monitor BMP, keep at 4      Would recommend palliative consult, patient states she would like to remain full code at this time, but also states difficult for daughter to manage her at home.  Some care decisions need made.     Rylee Clements NP

## 2022-05-05 NOTE — PROCEDURES
Interventional Radiology  Procedure Note        5/5/2022 4:24 PM    Patient: Josie Schaefer     Informed consent obtained    Diagnosis: Osteomyelitis    Procedure(s): CT guided aspiration with lavage of space between fracture fragments of left hip    Specimens removed:  5cc blood-tinged fluid.      Complications: None    Primary Physician: Erick Izquierdo MD    Recommendations: N/A    Discharge Disposition: return to floor    Full dictated report to follow    Erick Izquierdo MD  Interventional Radiology  [facility], [facility].  4:24 PM, 5/5/2022

## 2022-05-05 NOTE — PROGRESS NOTES
1900: Bedside shift change report given to Mychal Morillo (oncoming nurse) by Dalton SALAZAR (offgoing nurse). Report included the following information SBAR, Kardex, ED Summary, Intake/Output, MAR, Recent Results and Cardiac Rhythm Afib. End of Shift Note    Bedside shift change report given to [address] [place] [address] (oncoming nurse) by Carol Hebert RN (offgoing nurse). Report included the following information SBAR, Kardex, ED Summary, Intake/Output, MAR, Recent Results and Cardiac Rhythm NSR    Shift worked:  1640-2210     Shift summary and any significant changes:     patient converted to NSR overnight, amio at 0.5mg. Constantly complains of pain, oxycodone given per order. SEE MAR. Concerns for physician to address:  change diet order     [facility]          Activity:  Activity Level: Bed Rest  Number times ambulated in hallways past shift: 0  Number of times OOB to chair past shift: 0    Cardiac:   Cardiac Monitoring: Yes      Cardiac Rhythm: Atrial Fib    Access:   Current line(s): PIV     Genitourinary:   Urinary status: incontinent and external catheter    Respiratory:   O2 Device: Nasal cannula  Chronic home O2 use?: YES  Incentive spirometer at bedside: NO       GI:  Last Bowel Movement Date:  (PTA)  Current diet:  DIET NPO Sips of Water with Meds, Ice Chips  Passing flatus: YES  Tolerating current diet: YES       Pain Management:   Patient states pain is manageable on current regimen: YES    Skin:  Gaston Score: 12  Interventions: turn team, speciality bed, float heels, increase time out of bed, foam dressing, PT/OT consult and internal/external urinary devices    Patient Safety:  Fall Score:  Total Score: 2  Interventions: bed/chair alarm, assistive device (walker, cane, etc), gripper socks, pt to call before getting OOB and stay with me (per policy)       Length of Stay:  Expected LOS: - - -  Actual LOS: [address], RN

## 2022-05-05 NOTE — PROGRESS NOTES
Name of Procedure: Image guided left femoral neck bone biopsy with moderate sedation    Sedation medications given: yes    Versed: 4 mg    Fentanyl: 100 mcg    Sedation Tolerated: well    Total Sedation Time: 30 minutes     Sedation Start: 15:30 p.m. Sedation End: 16:00 p.m. Vital Signs: stable    Fluids Removed: Approximately 50 mL of blood tinged bone aspirate     Samples sent to lab: yes    Any complications related to procedure: none    Postprocedure Care Needed/order sets have been placed in [address] [place] [address]      Patient is A&OX4, on 4L of oxygen via NC, and is in NAD after procedure. Bedside report given to primary RN Mily Majano at this time. Patient has no restrictions from IR at this time.

## 2022-05-05 NOTE — PROGRESS NOTES
Spiritual Care Assessment/Progress Note  [facility]      NAME: Rigo Villafuerte      MRN: [id number]  AGE: 57 y.o. SEX: female  Jewish Affiliation: Robel Mireles   Language: English     5/5/2022     Total Time (in minutes): 10     Spiritual Assessment begun in [facility] PRO[facility]ARE through conversation with:         [x]Patient        [] Family    [] Friend(s)        Reason for Consult: Palliative Care, Initial/Spiritual Assessment     Spiritual beliefs: (Please include comment if needed)     [] Identifies with a rosalina tradition:         [] Supported by a rosalina community:            [] Claims no spiritual orientation:           [] Seeking spiritual identity:                [] Adheres to an individual form of spirituality:           [x] Not able to assess:                           Identified resources for coping:      [] Prayer                               [] Music                  [] Guided Imagery     [] Family/friends                 [] Pet visits     [] Devotional reading                         [x] Unknown     [] Other:                                              Interventions offered during this visit: (See comments for more details)                Plan of Care:     [] Support spiritual and/or cultural needs    [] Support AMD and/or advance care planning process      [] Support grieving process   [] Coordinate Rites and/or Rituals    [] Coordination with community clergy   [] No spiritual needs identified at this time   [] Detailed Plan of Care below (See Comments)  [] Make referral to Music Therapy  [] Make referral to Pet Therapy     [] Make referral to Addiction services  [] Make referral to [facility]  [] Make referral to Spiritual Care Partner  [] No future visits requested        [x] Contact Spiritual Care for further referrals     Comments:   reviewed patient's chat and consulted with nurse for palliative care initial spiritual assessment.  She went down for a procedure, per nurse. Please contact spiritual care for any further referrals. Visited by: Idalia Christensen.    Paging Service: [phone])

## 2022-05-05 NOTE — H&P
Radiology History and Physical    Patient: Jeffrey Hardwick 57 y.o. female       Chief Complaint: Wound Check (Per EMS pt presents from home with bed sores, home health nurse advising her to be sent over. Per EMS APS will be involved and family is no longer able to care for her at home. )      History of Present Illness: 57 Year old woman with left hip fracture, suspected osteomyelitis at the fracture site. History:    Past Medical History:   Diagnosis Date    Acute on chronic respiratory failure with hypoxia and hypercapnia ([facility]) 11/20/2020    Atrial fibrillation ([facility] 11/4/2020    CHF (congestive heart failure) ([facility])     Diabetes ([facility]     Hypertension     Ill-defined condition     high cholesterol    Ill-defined condition     tachycardia    JOSLYN (obstructive sleep apnea) 11/20/2020    Other and unspecified symptoms and signs involving general sensations and perceptions     ruptured disc    S/P cardiac cath 11/4/2020    11/3/2020 nonobstructive disease     No family history on file. Social History     Socioeconomic History    Marital status: SINGLE     Spouse name: Not on file    Number of children: Not on file    Years of education: Not on file    Highest education level: Not on file   Occupational History    Not on file   Tobacco Use    Smoking status: Former Smoker    Smokeless tobacco: Never Used   Substance and Sexual Activity    Alcohol use: No    Drug use: No    Sexual activity: Not on file   Other Topics Concern    Not on file   Social History Narrative    Not on file     Social Determinants of Health     Financial Resource Strain:     Difficulty of Paying Living Expenses: Not on file   Food Insecurity:     Worried About [address] in the Last Year: Not on file    Quita of Food in the Last Year: Not on file   Transportation Needs:     Lack of Transportation (Medical): Not on file    Lack of Transportation (Non-Medical):  Not on file   Physical Activity:     Days of Exercise per Week: Not on file    Minutes of Exercise per Session: Not on file   Stress:     Feeling of Stress : Not on file   Social Connections:     Frequency of Communication with Friends and Family: Not on file    Frequency of Social Gatherings with Friends and Family: Not on file    Attends Sabianist Services: Not on file    Active Member of Clubs or Organizations: Not on file    Attends Club or Organization Meetings: Not on file    Marital Status: Not on file   Intimate Partner Violence:     Fear of Current or Ex-Partner: Not on file    Emotionally Abused: Not on file    Physically Abused: Not on file    Sexually Abused: Not on file   Housing Stability:     Unable to Pay for Housing in the Last Year: Not on file    Number of [place] in the Last Year: Not on file    Unstable Housing in the Last Year: Not on file       Allergies: Allergies   Allergen Reactions    Cefazolin Rash    Other Plant, Animal, Environmental Rash     Ivory soap caused rash on hands.        Current Medications:  Current Facility-Administered Medications   Medication Dose Route Frequency    balsam peru-castor oiL (VENELEX) ointment   Topical BID    sodium chloride (NS) flush 5-40 mL  5-40 mL IntraVENous Q8H    sodium chloride (NS) flush 5-40 mL  5-40 mL IntraVENous PRN    acetaminophen (TYLENOL) tablet 650 mg  650 mg Oral Q6H PRN    Or    acetaminophen (TYLENOL) suppository 650 mg  650 mg Rectal Q6H PRN    polyethylene glycol (MIRALAX) packet 17 g  17 g Oral DAILY    bisacodyL (DULCOLAX) tablet 5 mg  5 mg Oral DAILY PRN    promethazine (PHENERGAN) tablet 12.5 mg  12.5 mg Oral Q6H PRN    Or    ondansetron (ZOFRAN) injection 4 mg  4 mg IntraVENous Q6H PRN    oxyCODONE IR (ROXICODONE) tablet 5-10 mg  5-10 mg Oral Q6H PRN    albuterol-ipratropium (DUO-NEB) 2.5 MG-0.5 MG/3 ML  3 mL Nebulization QID RT    atorvastatin (LIPITOR) tablet 10 mg  10 mg Oral QHS    metoprolol tartrate (LOPRESSOR) tablet 25 mg  25 mg Oral BID    [Held by provider] furosemide (LASIX) tablet 40 mg  40 mg Oral DAILY    [Held by provider] levoFLOXacin (LEVAQUIN) 750 mg in D5W IVPB  750 mg IntraVENous Q24H    [Held by provider] 0.9% sodium chloride infusion  100 mL/hr IntraVENous CONTINUOUS    miconazole (SECURA) 2 % extra thick cream   Topical BID    ammonium lactate (LAC-HYDRIN) 12 % lotion   Topical BID    [Held by provider] enoxaparin (LOVENOX) injection 135 mg  135 mg SubCUTAneous Q12H    amiodarone (CORDARONE) 375 mg/250 mL D5W infusion  0.5 mg/min IntraVENous CONTINUOUS        Physical Exam:  Blood pressure (!) 121/37, pulse 90, temperature 98.3 °F (36.8 °C), resp. rate 19, height 5' 3\" (1.6 m), weight 128 kg (282 lb 3 oz), SpO2 100 %. GENERAL: alert, cooperative, no distress, appears stated age,   LUNG: Nonlabored respiration on room air  HEART: regular rate and rhythm    Alerts:    Hospital Problems  Date Reviewed: 11/20/2020          Codes Class Noted POA    * (Principal) Hip osteomyelitis ([facility] ICD-10-CM: M86.9  ICD-9-CM: 730.25  5/4/2022 Unknown        Mixed hyperlipidemia (Chronic) ICD-10-CM: E78.2  ICD-9-CM: 272.2  11/15/2020 Yes        A-fib ([facility] ICD-10-CM: I48.91  ICD-9-CM: 427.31  11/4/2020 Unknown              Laboratory:      Recent Labs     05/05/22  0341   HGB 9.8*   HCT 32.8*   WBC 10.8   *   BUN 13   CREA 0.47*   K 3.3*         Plan of Care/Planned Procedure:  Risks, benefits, and alternatives reviewed with patient and she agrees to proceed with the procedure. CT guided aspiration of kimmy-fracture fluid of lef thip    Deemed appropriate for moderate sedation with versed and fentanyl.     Puja Jones MD  Interventional Radiology  [facility]  4:23 PM, 5/5/2022

## 2022-05-05 NOTE — PROGRESS NOTES
Spoke with Estefany ANGELA in regards to REINA Vincent's plan to do a bone biopsy versus an aspiration. PA Slaven ok with bone biopsy plan. Primary RN confirmed pt has been NPO. Pt sent for and will complete biopsy today.

## 2022-05-05 NOTE — PROGRESS NOTES
ADULT PROTOCOL: JET AEROSOL  REASSESSMENT    Patient  Malachi Swain     57 y.o.   female     5/5/2022  4:32 PM    Breath Sounds Pre Procedure: Right Breath Sounds: Diminished                               Left Breath Sounds: Diminished    Breath Sounds Post Procedure: Right Breath Sounds: Clear,Diminished                                 Left Breath Sounds: Clear,Diminished    Breathing pattern: Pre procedure Breathing Pattern: Regular          Post procedure Breathing Pattern: Retractions, intercostal    Heart Rate: Pre procedure Pulse: 79           Post procedure Pulse: 140    Resp Rate: Pre procedure Respirations: 20           Post procedure Respirations: 30          Cough: Pre procedure Cough: Non-productive               Post procedure Cough: Non-productive    Oxygen: O2 Device: Nasal cannula    SpO2: Pre procedure SpO2: 96 %                 Post procedure SpO2: 94 %      Nebulizer Therapy: Current medications Aerosolized Medications: DuoNeb      Problem List:   Patient Active Problem List   Diagnosis Code    COVID-19 ruled out Z20.822    A-fib ([facility] I48.91    S/P cardiac cath Z98.890    Mixed hyperlipidemia E78.2    Morbid obesity ([facility] E66.01    Acute respiratory failure (HCC) J96.00    Atrial flutter with rapid ventricular response (HCC) I48.92    JOSLYN (obstructive sleep apnea) G47.33    Acute on chronic respiratory failure with hypoxia and hypercapnia (HCC) J96.21, J96.22    SOB (shortness of breath) R06.02    Physical deconditioning R53.81    Counseling regarding advance care planning and goals of care Z71.89    Chronic pain G89.29    Dependence on supplemental oxygen Z99.81    Essential hypertension I10    Heart failure (HCC) I50.9    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

## 2022-05-05 NOTE — PROGRESS NOTES
Hospitalist Progress Note    NAME: Sergey Kim   :  1960   MRN:  [id number]       Assessment / Plan:    Left hip fracture displaced POA age undetermined  Question left hip osteomyelitis POA  Bilateral buttock and sacral wounds POA  She reports bilateral severe leg pain when moved, especially left hip  She only reports trauma years ago, denies any recently  She is very vague in terms of how long this has been going on  Found on CT scan done in the emergency room rule out osteomyelitis              Original reason she was sent to the ED was because of worsening buttock/sacral wounds  MRI was done which is more suggestive of fracture and osteomyelitis  [facility] was contacted await their input  As needed oxycodone  Did not examine the wounds given the pain on motion  Will consult wound care      Radiology consult for aspiration/Bx. Of note, pt given one dose of Vanco/zosyn in ED and then started on Levaquin. Hold Abx till Bx tone, pt is not toxic. Hold lovenox for the procedure  No surgical interventions from Ortho stand point, pt is bedbound  D/w ID  Hold IVF given HF/edema   On Amio as per cardio,  ? AC on DC, pt is not on AC at home d/t nose bleed, she agreed on trying [facility]   Resume lasix  Wound care on board    Diarrhea at home  Severe diarrhea at discharge, led to worsening skin irritation in her sacral and buttock area  Concerned about these wounds prompted transfer  Did receive IV antibiotics last admission  She reports the diarrhea is a bit better, will check stool studies and C.  Difficile  Stop antibiotics if urine culture negative     Possible UTI POA  UA 30 mg percent protein, nitrate positive, moderate blood, 0-5 RBCs, 20-50 WBCs, 1+ bacteria              Check CPK to rule out rhabdo given the UA profile  Urine culture in lab  IV ceftriaxone, stop if urine culture negative     Chronic hypoxic respiratory failure POA Baseline on 3 to 4 L nasal cannula oxygen  Chronic diastolic congestive heart failure POA  COPD POA  Pt is currently stable on 3-4LNC, her baseline requirement.    Recent admission 4/21 to 4/27/2022 with shortness of breath and hypoxia  Treated with a course of steroids, doxycycline, diuresis  Discharged home at baseline  Continue diuresis IV  Currently off prednisone \"it was causing diarrhea\"  Continue nebs and oxygen      Paroxysmal A. Fib  Not on any blood thinner and not taking aspirin because she getting nosebleeds with aspirin.                Multiple prior cardioversion procedures  Continue metoprolol. Currently in normal sinus rhythm     Steroid induced hyperglycemia  Glycemic last admit on steroids   A1c level 5.4 on 4/21  Currently taking diabetes medicines at homeHypertension controlled. [facility]' PTA meds.      Morbid obesity/Impaired mobility/functional quadriplegia  Does not ambulate for over 2 years  With her daughter, having increasing difficulty caring for the patient      Morbid Obesity POA Body mass index is 52.06 kg/m².     Given the patient's current clinical presentation, I have a high level of concern for decompensation if discharged from the emergency department. My assessment of this patient's clinical condition and my plan of care is as noted above.     DVT prophylaxis with lovenox     Code status:full code  NOK: Daughter  Recommended Disposition: TBD   Anticipated Discharge Date:  >48 hours        Subjective:     Discussed with RN events overnight. Hip pain  Afebrile  No SOB/CP    Review of Systems:  Symptom Y/N Comments  Symptom Y/N Comments   Fever/Chills    Chest Pain     Poor Appetite    Edema     Cough    Abdominal Pain     Sputum    Joint Pain     SOB/CHOPRA    Pruritis/Rash     Nausea/vomit    Tolerating PT/OT     Diarrhea    Tolerating Diet     Constipation    Other       PO intake: No data found.     Wt Readings from Last 10 Encounters:   05/03/22 133.3 kg (293 lb 14 oz)   04/26/22 134.7 kg (297 lb)   12/16/20 131.5 kg (290 lb)   12/07/20 136 kg (299 lb 13.2 oz)   11/23/20 138.1 kg (304 lb 8 oz)   11/03/20 142 kg (313 lb)   09/15/20 140.2 kg (309 lb)   09/04/20 140.2 kg (309 lb)   09/01/20 140.2 kg (309 lb)   08/14/20 142 kg (313 lb)       Objective:     VITALS:   Last 24hrs VS reviewed since prior progress note.  Most recent are:  Patient Vitals for the past 24 hrs:   Temp Pulse Resp BP SpO2   05/05/22 0727 97.8 °F (36.6 °C) 80 23 (!) 131/51 99 %   05/05/22 0718 -- -- -- -- 97 %   05/05/22 0400 98 °F (36.7 °C) 80 23 (!) 141/58 97 %   05/05/22 0000 98.5 °F (36.9 °C) 78 21 (!) 113/51 96 %   05/04/22 2015 -- (!) 128 21 (!) 87/69 98 %   05/04/22 2002 -- -- -- -- 96 %   05/04/22 2000 -- (!) 117 24 (!) 85/62 97 %   05/04/22 1945 -- (!) 135 22 (!) 119/104 96 %   05/04/22 1930 98.4 °F (36.9 °C) (!) 148 24 110/85 97 %   05/04/22 1915 -- (!) 147 22 97/73 96 %   05/04/22 1900 -- (!) 159 19 129/62 96 %   05/04/22 1859 98.3 °F (36.8 °C) (!) 146 22 129/62 96 %   05/04/22 1646 -- (!) 144 24 115/75 95 %   05/04/22 1500 -- (!) 147 28 123/81 98 %   05/04/22 1430 -- (!) 155 19 91/61 96 %   05/04/22 1330 -- (!) 162 26 (!) 122/90 96 %   05/04/22 1130 -- (!) 165 27 (!) 107/95 93 %   05/04/22 1100 -- (!) 159 27 119/63 97 %   05/04/22 1051 -- -- -- -- 97 %   05/04/22 1030 -- (!) 143 (!) 32 (!) 118/90 90 %   05/04/22 1000 -- (!) 149 28 107/66 95 %   05/04/22 0959 98.9 °F (37.2 °C) (!) 101 23 107/66 95 %       Intake/Output Summary (Last 24 hours) at 5/5/2022 0951  Last data filed at 5/5/2022 0400  Gross per 24 hour   Intake 3126 ml   Output 150 ml   Net 2976 ml        I had a face to face encounter, and independently examined this patient on 5/5/2022, as outlined below:    PHYSICAL EXAM:  General:          Alert, cooperative in no distress     HEENT:           Normocephalic, atraumatic    PERRL,  Sclera no icterus                    Neck:               No meningismus, trachea midline, no carotid bruits                           Thyroid not enlarged, no nodules or tenderness  Lungs:             Clear to auscultation bilaterally. No wheezing or rales                          No accessory muscle use or retractions. Heart:              Regular rate and rhythm,  no murmur or gallop. 2+ LE edema  Abdomen:        Soft, non-tender. Not distended. Bowel sounds normal.                           No masses, No Hepatosplenomegaly, No Rebound or guarding  Musculoskeletal:  No Joint swelling, erythema, warmth. No Cyanosis or clubbing  Skin:                 No rashes                           Not Jaundiced   No nodules or thickening  Neurologic:      Alert and oriented X 3, follows commands                           Cranial nerves 2 to 12 intact      Labs     I reviewed today's most current labs and imaging studies. Pertinent labs include:  Recent Labs     05/05/22 0341 05/03/22  1600   WBC 10.8 16.4*   HGB 9.8* 12.2   HCT 32.8* 39.8   * 138*     Recent Labs     05/05/22 0341 05/04/22  0514 05/04/22  0453 05/03/22  1600   *  --  138 136   K 3.3*  --  3.3* 3.6   CL 91*  --  99 97   CO2 33*  --  35* 38*   *  --  152* 234*   BUN 13  --  17 22*   CREA 0.47*  --  0.44* 0.69   CA 8.0*  --  8.8 8.8   MG  --  2.2  --   --    ALB  --   --  2.4* 2.5*   TBILI  --   --  1.2* 1.0   ALT  --   --  44 52     MRI PELV W WO CONT    Result Date: 5/4/2022  1. Redemonstrated left femoral neck displaced fracture with fluid in the fracture gap and edema/enhancement and surrounding marrow and soft tissues. Findings may reflect a healing fracture with possible superimposed osteomyelitis/osteonecrosis. 2.  Right sacral and bilateral parasymphyseal pubic insufficiency fractures. 3.  RIGHT femoral head and LEFT greater trochanter nondisplaced fractures. CT ABD PELV W CONT    Result Date: 5/3/2022  1.   Displaced left femoral neck fracture deformity with aggressive erosive changes and sclerosis along the fracture margin ([id number]). Consider acute osteomyelitis and/or septic arthritis given the clinical presentation. 2.  Bilateral nonobstructing nephrolithiasis. 3.  Anusha mesentery, nonspecific finding which can be seen with enteritis etiologies (e.g., mesenteric panniculitis, enteritis, pancreatitis, etc.)     XR CHEST PORT    Result Date: 5/3/2022  Improved congestion and pulmonary edema with possible left pleural effusion. No results found. 04/21/22    ECHO ADULT COMPLETE 04/23/2022 4/23/2022    Interpretation Summary    Left Ventricle: Normal left ventricular systolic function with a visually estimated EF of 60 - 65%. Left ventricle size is normal. Normal wall thickness. Normal wall motion. Normal diastolic function.   Technical qualifiers: Echo study was technically difficult with poor endocardial visualization and technically difficult due to patient's body habitus.     Signed by: Samuel Ibarra MD on 4/23/2022  6:39 AM       Current Medications:     Current Facility-Administered Medications:     sodium chloride (NS) flush 5-40 mL, 5-40 mL, IntraVENous, Q8H, Andrew Choe MD, 10 mL at 05/05/22 3021    sodium chloride (NS) flush 5-40 mL, 5-40 mL, IntraVENous, PRN, Andrew Choe MD    acetaminophen (TYLENOL) tablet 650 mg, 650 mg, Oral, Q6H PRN, 650 mg at 05/05/22 7345 **OR** acetaminophen (TYLENOL) suppository 650 mg, 650 mg, Rectal, Q6H PRN, Andrew Choe MD    polyethylene glycol (MIRALAX) packet 17 g, 17 g, Oral, DAILY, Andrew Choe MD    bisacodyL (DULCOLAX) tablet 5 mg, 5 mg, Oral, DAILY PRN, Andrew Choe MD    promethazine (PHENERGAN) tablet 12.5 mg, 12.5 mg, Oral, Q6H PRN **OR** ondansetron (ZOFRAN) injection 4 mg, 4 mg, IntraVENous, Q6H PRN, Andrew Choe MD    oxyCODONE IR (ROXICODONE) tablet 5-10 mg, 5-10 mg, Oral, Q6H PRN, Jazmyn Orona MD, 10 mg at 05/05/22 6479   albuterol-ipratropium (DUO-NEB) 2.5 MG-0.5 MG/3 ML, 3 mL, Nebulization, QID RT, Ernst Choe MD, 3 mL at 22 0478    atorvastatin (LIPITOR) tablet 10 mg, 10 mg, Oral, QHS, Ernst Choe MD, 10 mg at 22    metoprolol tartrate (LOPRESSOR) tablet 25 mg, 25 mg, Oral, BID, Ernst Choe MD, 25 mg at 22 8290    [Held by provider] furosemide (LASIX) tablet 40 mg, 40 mg, Oral, DAILY, Ernst Choe MD, 40 mg at 22 0934    [Held by provider] levoFLOXacin (LEVAQUIN) 750 mg in D5W IVPB, 750 mg, IntraVENous, Q24H, Ernst Choe MD, Last Rate: 100 mL/hr at 22 0637, 750 mg at 22 0637    [Held by provider] 0.9% sodium chloride infusion, 100 mL/hr, IntraVENous, CONTINUOUS, Randi Lawson MD, Last Rate: 100 mL/hr at 22 0325, 100 mL/hr at 22 0325    miconazole (SECURA) 2 % extra thick cream, , Topical, BID, Jackie Garcia MD, Given at 22 8895    ammonium lactate (LAC-HYDRIN) 12 % lotion, , Topical, BID, Ian PEREZ MD, Given at 22 2501    [Held by provider] enoxaparin (LOVENOX) injection 135 mg, 135 mg, SubCUTAneous, Q12H, Kayleighaniel Franc L, NP, 135 mg at 22    [] amiodarone (CORDARONE) 375 mg/250 mL D5W infusion, 1 mg/min, IntraVENous, CONTINUOUS, Transfusion Completed at 22 **FOLLOWED BY** amiodarone (CORDARONE) 375 mg/250 mL D5W infusion, 0.5 mg/min, IntraVENous, CONTINUOUS, Kayleighanibals Franc L, NP, Last Rate: 20 mL/hr at 22, 0.5 mg/min at 22     Procedures: see electronic medical records for all procedures/Xrays and details which were not copied into this note but were reviewed prior to creation of Plan.     Reviewed most current lab test results and cultures  YES  Reviewed most current radiology test results   YES  Review and summation of old records today    NO  Reviewed patient's current orders and MAR    YES  PMH/SH reviewed - no change compared to H&P  ________________________________________________________________________  Care Plan discussed with:    Comments   Patient x    Family      RN x    Care Manager     Consultant  x Radiology/ID                     Multidiciplinary team rounds were held today with , nursing, pharmacist and clinical coordinator. Patient's plan of care was discussed; medications were reviewed and discharge planning was addressed.      ________________________________________________________________________  Total NON critical care TIME:   40  Minutes    Total CRITICAL CARE TIME Spent:   Minutes non procedure based      Comments   >50% of visit spent in counseling and coordination of care x     This includes time during multidisciplinary rounds if indicated above   ________________________________________________________________________  Jamarcus Marques MD

## 2022-05-06 PROBLEM — M25.552 PAIN IN LEFT HIP: Status: ACTIVE | Noted: 2022-05-06

## 2022-05-06 LAB
ANION GAP SERPL CALC-SCNC: 3 MMOL/L (ref 5–15)
BUN SERPL-MCNC: 11 MG/DL (ref 6–20)
BUN/CREAT SERPL: 23 (ref 12–20)
CALCIUM SERPL-MCNC: 9.1 MG/DL (ref 8.5–10.1)
CHLORIDE SERPL-SCNC: 94 MMOL/L (ref 97–108)
CO2 SERPL-SCNC: 37 MMOL/L (ref 21–32)
COMMENT, HOLDF: NORMAL
CREAT SERPL-MCNC: 0.47 MG/DL (ref 0.55–1.02)
ERYTHROCYTE [DISTWIDTH] IN BLOOD BY AUTOMATED COUNT: 16.3 % (ref 11.5–14.5)
GLUCOSE SERPL-MCNC: 118 MG/DL (ref 65–100)
HCT VFR BLD AUTO: 32.4 % (ref 35–47)
HGB BLD-MCNC: 9.5 G/DL (ref 11.5–16)
MCH RBC QN AUTO: 30.4 PG (ref 26–34)
MCHC RBC AUTO-ENTMCNC: 29.3 G/DL (ref 30–36.5)
MCV RBC AUTO: 103.5 FL (ref 80–99)
NRBC # BLD: 0 K/UL (ref 0–0.01)
NRBC BLD-RTO: 0 PER 100 WBC
PLATELET # BLD AUTO: 120 K/UL (ref 150–400)
PMV BLD AUTO: 12 FL (ref 8.9–12.9)
POTASSIUM SERPL-SCNC: 3.9 MMOL/L (ref 3.5–5.1)
RBC # BLD AUTO: 3.13 M/UL (ref 3.8–5.2)
SAMPLES BEING HELD,HOLD: NORMAL
SODIUM SERPL-SCNC: 134 MMOL/L (ref 136–145)
WBC # BLD AUTO: 12.1 K/UL (ref 3.6–11)

## 2022-05-06 PROCEDURE — 74011000258 HC RX REV CODE- 258: Performed by: NURSE PRACTITIONER

## 2022-05-06 PROCEDURE — 99223 1ST HOSP IP/OBS HIGH 75: CPT | Performed by: NURSE PRACTITIONER

## 2022-05-06 PROCEDURE — 80048 BASIC METABOLIC PNL TOTAL CA: CPT

## 2022-05-06 PROCEDURE — 36415 COLL VENOUS BLD VENIPUNCTURE: CPT

## 2022-05-06 PROCEDURE — 74011000250 HC RX REV CODE- 250: Performed by: INTERNAL MEDICINE

## 2022-05-06 PROCEDURE — 74011250636 HC RX REV CODE- 250/636: Performed by: HOSPITALIST

## 2022-05-06 PROCEDURE — 74011250637 HC RX REV CODE- 250/637: Performed by: INTERNAL MEDICINE

## 2022-05-06 PROCEDURE — 74011000258 HC RX REV CODE- 258: Performed by: INTERNAL MEDICINE

## 2022-05-06 PROCEDURE — 99223 1ST HOSP IP/OBS HIGH 75: CPT | Performed by: INTERNAL MEDICINE

## 2022-05-06 PROCEDURE — 74011250637 HC RX REV CODE- 250/637: Performed by: GENERAL ACUTE CARE HOSPITAL

## 2022-05-06 PROCEDURE — 85027 COMPLETE CBC AUTOMATED: CPT

## 2022-05-06 PROCEDURE — 94640 AIRWAY INHALATION TREATMENT: CPT

## 2022-05-06 PROCEDURE — 74011250636 HC RX REV CODE- 250/636: Performed by: INTERNAL MEDICINE

## 2022-05-06 PROCEDURE — 65270000046 HC RM TELEMETRY

## 2022-05-06 PROCEDURE — 74011250637 HC RX REV CODE- 250/637: Performed by: NURSE PRACTITIONER

## 2022-05-06 PROCEDURE — 74011250636 HC RX REV CODE- 250/636: Performed by: NURSE PRACTITIONER

## 2022-05-06 RX ORDER — AMIODARONE HYDROCHLORIDE 200 MG/1
400 TABLET ORAL DAILY
Status: DISCONTINUED | OUTPATIENT
Start: 2022-05-11 | End: 2022-05-17 | Stop reason: HOSPADM

## 2022-05-06 RX ORDER — VANCOMYCIN 2 GRAM/500 ML IN 0.9 % SODIUM CHLORIDE INTRAVENOUS
2000 ONCE
Status: COMPLETED | OUTPATIENT
Start: 2022-05-06 | End: 2022-05-06

## 2022-05-06 RX ORDER — MORPHINE SULFATE 2 MG/ML
1 INJECTION, SOLUTION INTRAMUSCULAR; INTRAVENOUS
Status: DISCONTINUED | OUTPATIENT
Start: 2022-05-06 | End: 2022-05-07

## 2022-05-06 RX ORDER — VANCOMYCIN HYDROCHLORIDE
1250 EVERY 12 HOURS
Status: DISCONTINUED | OUTPATIENT
Start: 2022-05-07 | End: 2022-05-08

## 2022-05-06 RX ORDER — MORPHINE SULFATE 2 MG/ML
2 INJECTION, SOLUTION INTRAMUSCULAR; INTRAVENOUS
Status: COMPLETED | OUTPATIENT
Start: 2022-05-06 | End: 2022-05-06

## 2022-05-06 RX ORDER — AMIODARONE HYDROCHLORIDE 200 MG/1
400 TABLET ORAL 2 TIMES DAILY
Status: COMPLETED | OUTPATIENT
Start: 2022-05-06 | End: 2022-05-10

## 2022-05-06 RX ADMIN — METOPROLOL TARTRATE 25 MG: 25 TABLET, FILM COATED ORAL at 17:57

## 2022-05-06 RX ADMIN — SODIUM CHLORIDE, PRESERVATIVE FREE 10 ML: 5 INJECTION INTRAVENOUS at 21:42

## 2022-05-06 RX ADMIN — OXYCODONE 10 MG: 5 TABLET ORAL at 00:35

## 2022-05-06 RX ADMIN — IPRATROPIUM BROMIDE AND ALBUTEROL SULFATE 3 ML: .5; 3 SOLUTION RESPIRATORY (INHALATION) at 07:42

## 2022-05-06 RX ADMIN — AMIODARONE HYDROCHLORIDE 400 MG: 200 TABLET ORAL at 09:41

## 2022-05-06 RX ADMIN — CASTOR OIL AND BALSAM, PERU: 788; 87 OINTMENT TOPICAL at 08:26

## 2022-05-06 RX ADMIN — LEVOFLOXACIN 750 MG: 5 INJECTION, SOLUTION INTRAVENOUS at 06:54

## 2022-05-06 RX ADMIN — CEFEPIME HYDROCHLORIDE 2 G: 2 INJECTION, POWDER, FOR SOLUTION INTRAVENOUS at 20:49

## 2022-05-06 RX ADMIN — OXYCODONE 10 MG: 5 TABLET ORAL at 06:54

## 2022-05-06 RX ADMIN — IPRATROPIUM BROMIDE AND ALBUTEROL SULFATE 3 ML: .5; 3 SOLUTION RESPIRATORY (INHALATION) at 15:15

## 2022-05-06 RX ADMIN — OXYCODONE 10 MG: 5 TABLET ORAL at 20:47

## 2022-05-06 RX ADMIN — RIVAROXABAN 20 MG: 20 TABLET, FILM COATED ORAL at 11:43

## 2022-05-06 RX ADMIN — MORPHINE SULFATE 1 MG: 2 INJECTION, SOLUTION INTRAMUSCULAR; INTRAVENOUS at 15:20

## 2022-05-06 RX ADMIN — CASTOR OIL AND BALSAM, PERU: 788; 87 OINTMENT TOPICAL at 20:49

## 2022-05-06 RX ADMIN — Medication: at 18:00

## 2022-05-06 RX ADMIN — CASTOR OIL AND BALSAM, PERU: 788; 87 OINTMENT TOPICAL at 00:37

## 2022-05-06 RX ADMIN — AMIODARONE HYDROCHLORIDE 400 MG: 200 TABLET ORAL at 17:56

## 2022-05-06 RX ADMIN — CEFEPIME HYDROCHLORIDE 2 G: 2 INJECTION, POWDER, FOR SOLUTION INTRAVENOUS at 14:05

## 2022-05-06 RX ADMIN — MICONAZOLE NITRATE: 20 CREAM TOPICAL at 08:41

## 2022-05-06 RX ADMIN — FUROSEMIDE 40 MG: 40 TABLET ORAL at 08:24

## 2022-05-06 RX ADMIN — MORPHINE SULFATE 1 MG: 2 INJECTION, SOLUTION INTRAMUSCULAR; INTRAVENOUS at 23:30

## 2022-05-06 RX ADMIN — Medication: at 08:26

## 2022-05-06 RX ADMIN — AMIODARONE HYDROCHLORIDE 0.5 MG/MIN: 50 INJECTION, SOLUTION INTRAVENOUS at 04:23

## 2022-05-06 RX ADMIN — MORPHINE SULFATE 1 MG: 2 INJECTION, SOLUTION INTRAMUSCULAR; INTRAVENOUS at 08:38

## 2022-05-06 RX ADMIN — MICONAZOLE NITRATE: 20 CREAM TOPICAL at 20:57

## 2022-05-06 RX ADMIN — SODIUM CHLORIDE, PRESERVATIVE FREE 10 ML: 5 INJECTION INTRAVENOUS at 06:54

## 2022-05-06 RX ADMIN — IPRATROPIUM BROMIDE AND ALBUTEROL SULFATE 3 ML: .5; 3 SOLUTION RESPIRATORY (INHALATION) at 11:48

## 2022-05-06 RX ADMIN — MORPHINE SULFATE 2 MG: 2 INJECTION, SOLUTION INTRAMUSCULAR; INTRAVENOUS at 18:06

## 2022-05-06 RX ADMIN — OXYCODONE 10 MG: 5 TABLET ORAL at 14:09

## 2022-05-06 RX ADMIN — VANCOMYCIN HYDROCHLORIDE 2000 MG: 10 INJECTION, POWDER, LYOPHILIZED, FOR SOLUTION INTRAVENOUS at 15:16

## 2022-05-06 RX ADMIN — ATORVASTATIN CALCIUM 10 MG: 10 TABLET, FILM COATED ORAL at 21:41

## 2022-05-06 RX ADMIN — SODIUM CHLORIDE, PRESERVATIVE FREE 10 ML: 5 INJECTION INTRAVENOUS at 15:22

## 2022-05-06 RX ADMIN — METOPROLOL TARTRATE 25 MG: 25 TABLET, FILM COATED ORAL at 08:24

## 2022-05-06 RX ADMIN — IPRATROPIUM BROMIDE AND ALBUTEROL SULFATE 3 ML: .5; 3 SOLUTION RESPIRATORY (INHALATION) at 19:46

## 2022-05-06 NOTE — PROGRESS NOTES
Bedside shift change report given to Craig Fitzpatrick, RN (oncoming nurse) by Halima Huston, MARIE (offgoing nurse). Report included the following information SBAR, Kardex, ED Summary, Intake/Output, MAR and Cardiac Rhythm sinus. 5459: Messaged Dr. Carmen Virk- patient having 9/10 pain despite silvia given 0654. MD states he will look into it. 1800: Patient complaining of pain, states only morphine works for her pain and only works for a short time. - Informed MD. MD gave verbal order for one time dose of morphine    Bedside shift change report given to Jessica Quezada, MARIE (oncoming nurse) by Craig Fitzpatrick, MARIE (offgoing nurse). Report included the following information SBAR, Kardex, ED Summary, Intake/Output, MAR and Cardiac Rhythm sinus arrythmia.

## 2022-05-06 NOTE — PROGRESS NOTES
1900: Bedside shift change report given to Mychal Morillo (oncoming nurse) by Tillie Bamberger (offgoing nurse). Report included the following information SBAR, Kardex, ED Summary, Procedure Summary, Intake/Output, MAR, Recent Results and Cardiac Rhythm NSR. End of Shift Note    Bedside shift change report given to Juanita (oncoming nurse) by Melody Gallardo RN (offgoing nurse). Report included the following information SBAR, Kardex, ED Summary, Intake/Output, MAR, Recent Results and Cardiac Rhythm NSR    Shift worked:  [id number]     Shift summary and any significant changes:     Patient complaining of generalized pain, oxycodone and tylenol given. SEE MAR Amio drip but continues to be on sinus overnight with occasional sinus tach because of pain. Refuse wound care and turning, educated patient about the importance of turning for better wound management but insist on refusal.      Concerns for physician to address:  Better pain control     Zone phone for oncoming shift:          Activity:  Activity Level: Bed Rest  Number times ambulated in hallways past shift: 0  Number of times OOB to chair past shift: 0    Cardiac:   Cardiac Monitoring: Yes      Cardiac Rhythm: Sinus Rhythm    Access:   Current line(s): PIV     Genitourinary:   Urinary status: incontinent and external catheter    Respiratory:   O2 Device: Nasal cannula  Chronic home O2 use?: NO  Incentive spirometer at bedside: NO       GI:  Last Bowel Movement Date:  (PTA)  Current diet:  ADULT DIET Easy to Chew  Passing flatus: YES  Tolerating current diet: YES       Pain Management:   Patient states pain is manageable on current regimen: YES    Skin:  Gaston Score: 12  Interventions: turn team, speciality bed, float heels, increase time out of bed, foam dressing, PT/OT consult, limit briefs and internal/external urinary devices    Patient Safety:  Fall Score:  Total Score: 2  Interventions: bed/chair alarm, assistive device (walker, cane, etc), gripper socks, pt to call before getting OOB and stay with me (per policy)       Length of Stay:  Expected LOS: - - -  Actual LOS: [address] [facility], RN

## 2022-05-06 NOTE — CONSULTS
Palliative Medicine Consult  Miguel A: [phone] ([phone])    Patient Name: Naila Davis  YOB: 1960    Date of Initial Consult: 5/6/22  Reason for Consult:   Requesting Provider: Bethany Lacy NP   Primary Care Physician: Unknown, Provider, MD     SUMMARY:   Naila Davis is a 57 y.o. with a past history of morbid obesity, chronic respiratory failure on 4 L of O2, COPD, CHF, morbid obesity, p. Afib, chronic pain, nonambulatory at baseline, who was admitted on 5/3/2022 from home with a diagnosis of osteomyelitis of left hip, closed left hip fracture, severe sepsis, diarrhea, acute cystitis without hematuria, pressure injuries. HPI: 5/3: sent to ED by home health RN for wellness check, decubitus wounds, safety concerns at home. Pt recently discharged from hospital on 4/27 following CHF exacerbation. Home RN visited today, noting several more pressure injuries. In addition she is concerned about her living conditions; EMS report pt's home has massive amounts of clutter making it difficult to maneuver through the home and pt's room and getting pt out of room safely. ED note indicates there were dead animals in the house (?)  Per EMS report, pt has refused offer for more assistance in her home. In addition to PIs, pt c/o diarrhea and left hip and leg pain (left hip and leg pain ongoing for months, following a fall). Admission course:  5/3: in ED, wbc 16.4, plt ct 138, glu 234, LA 3.69, CRP >9.5, UA sent for UC. CXR showed improved congestion and pulmonary edema with possible left pleural effusion. CT of abd and pelvis:  1. Displaced left femoral neck fracture deformity with aggressive erosive changes and sclerosis along the fracture margin ([id number]). Consider acute osteomyelitis and/or septic arthritis given the clinical presentation. 2.  Bilateral nonobstructing nephrolithiasis.   3.  Anusha mesentery, nonspecific finding which can be seen with enteritis etiologies (e.g., mesenteric panniculitis, enteritis, pancreatitis, etc.)    5/3: MRI of pelvis:  1. Redemonstrated left femoral neck displaced fracture with fluid in the fracture gap and edema/enhancement and surrounding marrow and soft tissues. Findings may reflect a healing fracture with possible superimposed osteomyelitis/osteonecrosis. 2.  Right sacral and bilateral parasymphyseal pubic insufficiency fractures. 3.  RIGHT femoral head and LEFT greater trochanter nondisplaced fractures. 5/4: eval by ortho, no plans for surgery. eval by cards for tachycardia, palpitations, SOB, afib with RVR. Converted to NSR overnight.   5/5: underwent CT guided aspiration of per-fracture fluid of left hip. Current medical issues leading to Palliative Medicine involvement include: goals of care. Psychosocial: bed bound for 2 years. Home 02 3-4L. Lives with dtr who assists with ADLs/IADLs   PALLIATIVE DIAGNOSES:   1. Pain left hip due to osteo and fracture  2. Sepsis/osteomyelitis: IVF and abx  3. Diarrhea   4. Pressure injuries  5. UTI  6. Chronic hypoxic respiratory failure (baseline 3-4LNC)  7. Steroid induced hyperglycemia  8. afib with RVR: recent CHO with EF WNL, amio gtt  9. JOSLYN  (patient states she has a bipap machine she uses at night at home)  10. Goals of care   PLAN:   11. Prior to visit, I completed an extensive review of patient's medical records, including medical documentation, vital signs, MARs, and results of various labs and other diagnostics. I also spoke with patient's nurse Jyotsna Patton. 12. Met with pt, reviewed her AMD, goals and code status. (see ACP note dated today). 13. Pain: pt reports both her legs hurt, left greater than right, that her LOP is 15/10, aggravated by movement. IV morphine helps. 1. Initial consult note routed to primary continuity provider and/or primary health care team members  2.  Communicated plan of care with: Palliative Maribel WHALEN 192 Team     GOALS OF CARE / TREATMENT PREFERENCES:     GOALS OF CARE:  Patient/Health Care Proxy Stated Goals: Prolong life    TREATMENT PREFERENCES:   Code Status: Full Code      Advance Care Planning:  [x] [facility] Interdisciplinary Team has updated the ACP Navigator with Health Care Decision Maker and Patient Capacity      Primary Decision Maker: Batsheva Magdaleno Child - [phone]    Secondary Decision Maker: Flaco Franz - Sister - [phone]  Advance Care Planning 5/6/2022   Patient's Healthcare Decision Maker is: Named in scanned ACP document   Confirm Advance Directive Yes, on file   Does the patient have other document types -       Medical Interventions: Full interventions       Other:    As far as possible, the palliative care team has discussed with patient / health care proxy about goals of care / treatment preferences for patient.      HISTORY:     History obtained from: chart, patient     The patient is:   [x] Verbal and participatory  [] Non-participatory due to:        Clinical Pain Assessment (nonverbal scale for severity on nonverbal patients):   Clinical Pain Assessment  Severity: 7  Location: both legs, left hip  Character: sharp, dull achy  Duration: months  Effect: hurts to move  Factors: movement  Frequency: constant     Activity (Movement): Restless, excessive activity and/or withdrawal reflexes    Duration: for how long has pt been experiencing pain (e.g., 2 days, 1 month, years)  Frequency: how often pain is an issue (e.g., several times per day, once every few days, constant)     FUNCTIONAL ASSESSMENT:     Palliative Performance Scale (PPS):  PPS: 20       PSYCHOSOCIAL/SPIRITUAL SCREENING:     Palliative IDT has assessed this patient for cultural preferences / practices and a referral made as appropriate to needs (Cultural Services, Patient Advocacy, Ethics, etc.)    Any spiritual / Synagogue concerns:  [] Yes /  [x] No   If \"Yes\" to discuss with pastoral care during IDT     Does caregiver feel burdened by caring for their loved one:   [] Yes /  [x] No /  [] No Caregiver Present    If \"Yes\" to discuss with social work during IDT    Anticipatory grief assessment:   [x] Normal  / [] Maladaptive     If \"Maladaptive\" to discuss with social work during IDT    ESAS Anxiety: Anxiety: 0    ESAS Depression: Depression: 0        REVIEW OF SYSTEMS:     Positive and pertinent negative findings in ROS are noted above in HPI. The following systems were [x] reviewed / [] unable to be reviewed as noted in HPI  Other findings are noted below. Systems: constitutional, ears/nose/mouth/throat, respiratory, gastrointestinal, genitourinary, musculoskeletal, integumentary, neurologic, psychiatric, endocrine. Positive findings noted below. Modified ESAS Completed by: provider   Fatigue: 3 Drowsiness: 0   Depression: 0 Pain: 7   Anxiety: 0 Nausea: 0   Anorexia: 0 Dyspnea: 0     Constipation: No              PHYSICAL EXAM:     From RN flowsheet:  Wt Readings from Last 3 Encounters:   05/05/22 282 lb 3 oz (128 kg)   04/26/22 297 lb (134.7 kg)   12/16/20 290 lb (131.5 kg)     Blood pressure (!) 148/51, pulse (!) 116, temperature 98.8 °F (37.1 °C), resp. rate 30, height 5' 3\" (1.6 m), weight 282 lb 3 oz (128 kg), SpO2 92 %.     Pain Scale 1: Numeric (0 - 10)  Pain Intensity 1: 0     Pain Location 1: Hip  Pain Orientation 1: Left  Pain Description 1: Aching,Constant  Pain Intervention(s) 1: Medication (see MAR)  Last bowel movement, if known:     Constitutional: super morbidly obese, AAOx3, pleasant and cooperative  Eyes: pupils equal, anicteric  ENMT: no nasal discharge, moist mucous membranes  Cardiovascular: tachy, regular rhythm, distal pulses intact  Respiratory: breathing not labored, symmetric  Gastrointestinal: soft non-tender, +bowel sounds  Musculoskeletal: left leg abducted position, cannot move either leg without significant pain  Skin: warm, dry  Neurologic: following commands, moving all extremities  Psychiatric: full affect, no hallucinations          HISTORY:     Principal Problem:    Hip osteomyelitis ([facility] (5/4/2022)    Active Problems:    A-fib ([facility] (11/4/2020)      Mixed hyperlipidemia (11/15/2020)      Past Medical History:   Diagnosis Date    Acute on chronic respiratory failure with hypoxia and hypercapnia ([facility]) 11/20/2020    Atrial fibrillation ([facility] 11/4/2020    CHF (congestive heart failure) ([facility])     Diabetes ([facility]     Hypertension     Ill-defined condition     high cholesterol    Ill-defined condition     tachycardia    JOSLYN (obstructive sleep apnea) 11/20/2020    Other and unspecified symptoms and signs involving general sensations and perceptions     ruptured disc    S/P cardiac cath 11/4/2020    11/3/2020 nonobstructive disease      Past Surgical History:   Procedure Laterality Date    MS COMPRE ELECTROPHYSIOL XM W/LEFT ATRIAL PACNG/REC N/A 11/23/2020    Lt Atrial Pace & Record During Ep Study performed by Anahi Spangler MD at [facility]    MS COMPRE EP EVAL ABLTJ 3D MAPG TX SVT N/A 11/23/2020    ABLATION A-FLUTTER performed by Anahi Spangler MD at [facility] C[facility]ATH [facility]   [facility] ADD ON N/A 11/23/2020    Ablation Svt/Vt Add On performed by Anahi Spangler MD at [facility] C[facility]    MS INTRACARDIAC ELECTROPHYSIOLOGIC 3D MAPPING N/A 11/23/2020    Ep 3d Mapping performed by Anahi Spangler MD at [facility] CARD[facility] CATH LAB    MS STIM/PACING HEART POST IV DRUG INFU N/A 11/23/2020    Drug Stimulation performed by Anahi Spangler MD at [facility] LAB      No family history on file. History reviewed, no pertinent family history. Social History     Tobacco Use    Smoking status: Former Smoker    Smokeless tobacco: Never Used   Substance Use Topics    Alcohol use: No     Allergies   Allergen Reactions    Cefazolin Rash    Other Plant, Animal, Environmental Rash     Ivory soap caused rash on hands.       Current Facility-Administered Medications Medication Dose Route Frequency    morphine injection 1 mg  1 mg IntraVENous Q4H PRN    amiodarone (CORDARONE) tablet 400 mg  400 mg Oral BID    [START ON 5/11/2022] amiodarone (CORDARONE) tablet 400 mg  400 mg Oral DAILY    rivaroxaban (XARELTO) tablet 20 mg  20 mg Oral DAILY    cefepime (MAXIPIME) 2 g in 0.9% sodium chloride (MBP/ADV) 100 mL MBP  2 g IntraVENous Q8H    [START ON 5/7/2022] L.acidophilus-paracasei-S.thermophil-bifidobacter (RISAQUAD) 8 billion cell capsule  1 Capsule Oral DAILY    vancomycin (VANCOCIN) 2000 mg in  ml infusion  2,000 mg IntraVENous ONCE    [START ON 5/7/2022] vancomycin (VANCOCIN) 1250 mg in  ml infusion  1,250 mg IntraVENous Q12H    balsam peru-castor oiL (VENELEX) ointment   Topical BID    sodium chloride (NS) flush 5-40 mL  5-40 mL IntraVENous Q8H    sodium chloride (NS) flush 5-40 mL  5-40 mL IntraVENous PRN    acetaminophen (TYLENOL) tablet 650 mg  650 mg Oral Q6H PRN    Or    acetaminophen (TYLENOL) suppository 650 mg  650 mg Rectal Q6H PRN    polyethylene glycol (MIRALAX) packet 17 g  17 g Oral DAILY    bisacodyL (DULCOLAX) tablet 5 mg  5 mg Oral DAILY PRN    promethazine (PHENERGAN) tablet 12.5 mg  12.5 mg Oral Q6H PRN    Or    ondansetron (ZOFRAN) injection 4 mg  4 mg IntraVENous Q6H PRN    oxyCODONE IR (ROXICODONE) tablet 5-10 mg  5-10 mg Oral Q6H PRN    albuterol-ipratropium (DUO-NEB) 2.5 MG-0.5 MG/3 ML  3 mL Nebulization QID RT    atorvastatin (LIPITOR) tablet 10 mg  10 mg Oral QHS    metoprolol tartrate (LOPRESSOR) tablet 25 mg  25 mg Oral BID    furosemide (LASIX) tablet 40 mg  40 mg Oral DAILY    [Held by provider] 0.9% sodium chloride infusion  100 mL/hr IntraVENous CONTINUOUS    miconazole (SECURA) 2 % extra thick cream   Topical BID    ammonium lactate (LAC-HYDRIN) 12 % lotion   Topical BID          LAB AND IMAGING FINDINGS:     Lab Results   Component Value Date/Time    WBC 12.1 (H) 05/06/2022 03:53 AM    HGB 9.5 (L) 05/06/2022 03:53 AM    PLATELET 252 (L) 03/34/9987 03:53 AM     Lab Results   Component Value Date/Time    Sodium 134 (L) 05/06/2022 09:31 AM    Potassium 3.9 05/06/2022 09:31 AM    Chloride 94 (L) 05/06/2022 09:31 AM    CO2 37 (H) 05/06/2022 09:31 AM    BUN 11 05/06/2022 09:31 AM    Creatinine 0.47 (L) 05/06/2022 09:31 AM    Calcium 9.1 05/06/2022 09:31 AM    Magnesium 2.2 05/04/2022 05:14 AM    Phosphorus 3.6 04/23/2022 02:55 AM      Lab Results   Component Value Date/Time    Alk. phosphatase 290 (H) 05/04/2022 04:53 AM    Protein, total 6.2 (L) 05/04/2022 04:53 AM    Albumin 2.4 (L) 05/04/2022 04:53 AM    Globulin 3.8 05/04/2022 04:53 AM     Lab Results   Component Value Date/Time    INR 1.4 (H) 11/24/2020 02:59 AM    Prothrombin time 14.6 (H) 11/24/2020 02:59 AM    aPTT 27.0 07/26/2020 05:34 AM      Lab Results   Component Value Date/Time    Ferritin 159 11/18/2020 12:36 PM      Lab Results   Component Value Date/Time    pH 7.37 04/25/2022 06:15 AM    PCO2 76 (H) 04/25/2022 06:15 AM    PO2 104 (H) 04/25/2022 06:15 AM     No components found for: [place]   Lab Results   Component Value Date/Time    CK 16 (L) 05/04/2022 04:53 AM    CK - MB <1.0 11/01/2020 04:33 AM                Total time: 80  Counseling / coordination time, spent as noted above: 55  > 50% counseling / coordination?: yes    Prolonged service was provided for  []30 min   []75 min in face to face time in the presence of the patient, spent as noted above. Time Start:   Time End:   Note: this can only be billed with 39889 (initial) or 53076 (follow up). If multiple start / stop times, list each separately.

## 2022-05-06 NOTE — PROGRESS NOTES
05/06/22 1527   Vitals   Temp 98.8 °F (37.1 °C)   Temp Source Oral   Pulse (Heart Rate) (!) 116   Resp Rate 30   O2 Sat (%) 92 %   Level of Consciousness Alert (0)   BP (!) 148/51   MAP (Monitor) 77   MAP (Calculated) 83   MEWS Score 5   Oxygen Therapy   Pulse via Oximetry 86 beats per minute     HR elevated, sinus tach

## 2022-05-06 NOTE — ACP (ADVANCE CARE PLANNING)
Advance Care Planning     Advance Care Planning (ACP) Physician/NP/PA Conversation      Date of Conversation: 5/3/2022  Conducted with: Patient with Decision Making Capacity    Healthcare Decision Maker:     Primary Decision Maker: Kianna Cadet - Goerge - [phone]    Secondary Decision Maker: Giuliana Manrique - Sister - [phone]  Click here to complete [address] including selection of the Healthcare Decision Maker Relationship (ie \"Primary\")          Care Preferences:    Hospitalization: \"If your health worsens and it becomes clear that your chance of recovery is unlikely, what would be your preference regarding hospitalization? \"  The patient would prefer hospitalization. Ventilation: \"If you were unable to breathe on your own and your chance of recovery was unlikely, what would be your preference about the use of a ventilator (breathing machine) if it was available to you? \"   The patient would desire the use of a ventilator. Resuscitation: \"In the event your heart stopped as a result of an underlying serious health condition, would you want attempts to be made to restart your heart, or would you prefer a natural death? \"   Yes, attempt to resuscitate. Additional topics discussed: treatment goals, benefit/burden of treatment options, ventilation preferences, hospitalization preferences and resuscitation preferences    Conversation Outcomes / Follow-Up Plan:   ACP complete - no further action today  Reviewed DNR/DNI and patient elects Full Code (Attempt Resuscitation)     Length of Voluntary ACP Conversation in minutes:  25 minutes    Anibal Carbajal NP       Patient confirms her AMD and POST remain consistent with her wishes. She wants full code, intubation and all restorative medical care to keep her alive as long as possible. She does not want to be kept alive on machines if there is no hope of recovery.

## 2022-05-06 NOTE — PROGRESS NOTES
[facility]  [address]  [phone]  [facility]  CARDIOLOGY PROGRESS NOTE    5/6/2022 7:59 AM    Admit Date: 5/3/2022    Admit Diagnosis:   Hip osteomyelitis ([address] [M86.9]  Atrial fibrillation   Subjective: Didi Lance Pt reports feeling fatigued this Am and having right hip pain, no chest pain or shortness of breath. Denies fluttering or palpitations.      Visit Vitals  BP (!) 136/49   Pulse 91   Temp 98.1 °F (36.7 °C)   Resp 21   Ht 5' 3\" (1.6 m)   Wt 282 lb 3 oz (128 kg)   SpO2 98%   BMI 49.99 kg/m²       Current Facility-Administered Medications   Medication Dose Route Frequency    balsam peru-castor oiL (VENELEX) ointment   Topical BID    sodium chloride (NS) flush 5-40 mL  5-40 mL IntraVENous Q8H    sodium chloride (NS) flush 5-40 mL  5-40 mL IntraVENous PRN    acetaminophen (TYLENOL) tablet 650 mg  650 mg Oral Q6H PRN    Or    acetaminophen (TYLENOL) suppository 650 mg  650 mg Rectal Q6H PRN    polyethylene glycol (MIRALAX) packet 17 g  17 g Oral DAILY    bisacodyL (DULCOLAX) tablet 5 mg  5 mg Oral DAILY PRN    promethazine (PHENERGAN) tablet 12.5 mg  12.5 mg Oral Q6H PRN    Or    ondansetron (ZOFRAN) injection 4 mg  4 mg IntraVENous Q6H PRN    oxyCODONE IR (ROXICODONE) tablet 5-10 mg  5-10 mg Oral Q6H PRN    albuterol-ipratropium (DUO-NEB) 2.5 MG-0.5 MG/3 ML  3 mL Nebulization QID RT    atorvastatin (LIPITOR) tablet 10 mg  10 mg Oral QHS    metoprolol tartrate (LOPRESSOR) tablet 25 mg  25 mg Oral BID    furosemide (LASIX) tablet 40 mg  40 mg Oral DAILY    levoFLOXacin (LEVAQUIN) 750 mg in D5W IVPB  750 mg IntraVENous Q24H    [Held by provider] 0.9% sodium chloride infusion  100 mL/hr IntraVENous CONTINUOUS    miconazole (SECURA) 2 % extra thick cream   Topical BID    ammonium lactate (LAC-HYDRIN) 12 % lotion   Topical BID    enoxaparin (LOVENOX) injection 135 mg  135 mg SubCUTAneous Q12H    amiodarone (CORDARONE) 375 mg/250 mL D5W infusion  0.5 mg/min IntraVENous CONTINUOUS         Objective:      Physical Exam    General: Morbid obesity , in no acute distress, cooperative and alert  HEENT: No carotid bruits, no JVD, trach is midline. Neck Supple, PERRL, EOM intact. Heart:  Normal S1/S2 negative S3 or S4. Regular, no murmur, gallop or rub. Respiratory: Diminished bilaterally at the bases no wheezing. Abdomen:   Soft, non-tender, no masses, bowel sounds are active. Extremities:  + lower extremity edema , normal cap refill, no cyanosis,  Neuro: A&Ox3, speech clear,  Skin: Skin color is normal. . Non diaphoretic  Vascular: 2+ pulses symmetric in bilateral radial      Data Review:   Recent Labs     05/06/22  0353 05/05/22 0341 05/03/22  1600   WBC 12.1* 10.8 16.4*   HGB 9.5* 9.8* 12.2   HCT 32.4* 32.8* 39.8   * 125* 138*     Recent Labs     05/05/22  0341 05/04/22  0514 05/04/22  0453 05/03/22  1600   *  --  138 136   K 3.3*  --  3.3* 3.6   CL 91*  --  99 97   CO2 33*  --  35* 38*   *  --  152* 234*   BUN 13  --  17 22*   CREA 0.47*  --  0.44* 0.69   CA 8.0*  --  8.8 8.8   MG  --  2.2  --   --    ALB  --   --  2.4* 2.5*   TBILI  --   --  1.2* 1.0   ALT  --   --  44 52       Recent Labs     05/04/22  0453   CPK 16*         Intake/Output Summary (Last 24 hours) at 5/6/2022 0759  Last data filed at 5/6/2022 0400  Gross per 24 hour   Intake 480 ml   Output 500 ml   Net -20 ml        Telemetry: NSR   EKG:  Cxray:    ECHO 4/22  Interpretation Summary         Left Ventricle: Normal left ventricular systolic function with a visually estimated EF of 60 - 65%. Left ventricle size is normal. Normal wall thickness. Normal wall motion. Normal diastolic function.     Technical qualifiers: Echo study was technically difficult with poor endocardial visualization and technically difficult due to patient's body habitus    Assessment:     Principal Problem:    Hip osteomyelitis ([address] (5/4/2022)    Active Problems:    A-fib ([facility] (11/4/2020)      Mixed hyperlipidemia (11/15/2020)        Plan:   Leena Anderson a 57 y. o. female with PMHx of PAF, chronic hypercapneia, hypoxia, untreated JOSLYN, HTN admitted for Hip osteomyelitis ([facility] [M86.9]      Multiple sacral wounds:    UTI:  Diarrhea; Cdiff w/u:  Abx and treatment as per internal medicine. Noting patient on Amiodarone and Levaquin. QT 384ms this AM.       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 Stop Lovenox resume Xarelto  Wean off Amiodarone drip PO load Amiodarone   Recent ECHO reviewed EF WNL  Admitted to hospitalist on tele unit (PCU)  Continue BB  Hx prior cardioversions  Patient not taking ASA d/t nosebleeds   Atrial Fibrillation CHADSVASC2 Score Stroke Risk:   57 y.o. <65        + 0    female Female +1   CHF HX: No    + 0   HTN HX: Yes    +1   Stroke/TIA/Thromboembolism No    +0   Vascular Disease HX: No    + 0   Diabetes Mellitus No    + 0   CHADSVASC 2 Score 2      Annual Stroke Risk 2.2%- moderate-high        Morbid Obesity:  COPD:  JOSLYN:  Chronic hypoxia:  Nebs and treatment as per internal medicine   On baseline O2 4L/NC     HLD:  Continue statin     HTN:   Controlled on BB      Hypokalemia:  Replace K, monitor BMP, keep at 4       Carolyn Posada NP     Patient seen and examined by me with nurse practitioner. I personally performed all components of the history, physical, and medical decision making and agree with the assessment and plan with minor modifications as noted. Transition to oral amio and oral oac. Keep K greater than 4 and correct hyponatremia. Will sign off.  Please call with ?bam Sow MD, Tomeka Gardenia

## 2022-05-06 NOTE — PROGRESS NOTES
Transition of Care Plan:     RUR:  17% - moderate risk  Disposition:  [facility]  - will need rapid COVID prior to admission. Follow up appointments:  PCP; Specialist  DME needed:  SNF will provide. Transportation at [address] [facility] to arrange. [place] or means to access home:  vj      IM Medicare Letter:  To be given closer to dc  Is patient a BCPI-A Bundle:  No                  If yes, was Bundle Letter given?: N/A  Is patient a  and connected with the [facility] [address] [place]  If yes, was [employer] transfer form completed and [facility] notified? Caregiver Contact:  Ivy Cuadra, daughter, [phone]  Discharge Caregiver contacted prior to [address] needed? Not at this time. Initial Note 09:35 am: In review of chart, pt is not medically stable for discharge. Unit CM will continue to follow.      Juan Pascal RN, BSN, [facility] [facility] Care Manager  [phone]

## 2022-05-06 NOTE — PROGRESS NOTES
Pharmacy Antimicrobial Kinetic Dosing    Indication for Antimicrobials: bone and joint infection     Current Regimen of Each Antimicrobial:  Vancomycin consult started 5/6 day 1  Cefepime 2gm IV q8h - started 5 day 1    Previous Antimicrobial Therapy:    Goal Level: AUC: 400-600 mg/hr/Liter/day    Date Dose & Interval Measured (mcg/mL) Predicted AUC/SUDHA                         Dosing calculator used: [email or web address] calculator    Significant Positive Cultures:   -    Conditions for Dosing Consideration: obese    Labs:  Recent Labs     22  0931 22  0341 22  0453   CREA 0.47* 0.47* 0.44*   BUN 11 13 17   PCT  --   --  1.41     Recent Labs     22  0353 22  0341 22  1600   WBC 12.1* 10.8 16.4*     Temp (24hrs), Av.3 °F (36.8 °C), Min:98.1 °F (36.7 °C), Max:98.7 °F (37.1 °C)        Creatinine Clearance (mL/min):   CrCl (Ideal Body Weight): 68.9  If actual weight < IBW: CrCl (Actual Body Weight) 168.4    Impression/Plan:   ID consulted/following  Vancomycin 2000mg IV loading dose then 1250mg IV q12h  For AUC at Cxx of 420  Plan to order vanc tr 5/7 pm  Continue Cefepime  Antimicrobial stop date - pending     Pharmacy will follow daily and adjust medications as appropriate for renal function and/or serum levels.     Thank you,  Shemar Ramirez, [facility]

## 2022-05-06 NOTE — PROGRESS NOTES
11:25 AM Spoke with Dr. Tabatha Sutherland. She is providing ID coverage today and will see patient.

## 2022-05-06 NOTE — PROGRESS NOTES
ADULT PROTOCOL: JET AEROSOL  REASSESSMENT    Patient  Shanti Childs     57 y.o.   female     5/6/2022  12:43 PM    Breath Sounds Pre Procedure: Right Breath Sounds: Diminished                               Left Breath Sounds: Diminished    Breath Sounds Post Procedure: Right Breath Sounds: Clear,Diminished                                 Left Breath Sounds: Clear,Diminished    Breathing pattern: Pre procedure Breathing Pattern: Regular          Post procedure Breathing Pattern: Retractions, intercostal    Heart Rate: Pre procedure Pulse: 75           Post procedure Pulse: 140    Resp Rate: Pre procedure Respirations: 21           Post procedure Respirations: 30        Cough: Pre procedure Cough: Non-productive               Post procedure Cough: Non-productive    Oxygen: O2 Device: Nasal cannula       SpO2: Pre procedure SpO2: 97 %                 Post procedure SpO2: 94 %      Nebulizer Therapy: Current medications Aerosolized Medications: DuoNeb      Problem List:   Patient Active Problem List   Diagnosis Code    COVID-19 ruled out Z20.822    A-fib ([facility] I48.91    S/P cardiac cath Z98.890    Mixed hyperlipidemia E78.2    Morbid obesity ([facility] E66.01    Acute respiratory failure (HCC) J96.00    Atrial flutter with rapid ventricular response (HCC) I48.92    JOSLYN (obstructive sleep apnea) G47.33    Acute on chronic respiratory failure with hypoxia and hypercapnia (HCC) J96.21, J96.22    SOB (shortness of breath) R06.02    Physical deconditioning R53.81    Counseling regarding advance care planning and goals of care Z71.89    Chronic pain G89.29    Dependence on supplemental oxygen Z99.81    Essential hypertension I10    Heart failure (HCC) I50.9    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

## 2022-05-06 NOTE — CONSULTS
Infectious Disease Consult    Date of Consultation:  5/6/22  Reason for Consultation:L femoral neck Osteomyelitis  Referring Physician: Dr Susi Espino  Date of Admission:5/4/22    Impression  Chronic displaced femoral neck fracture of the left hip  Possible Osteomyelitis of the left hip  Nonambulatory for greater than 2 years  MRI - pelvis-L femoral neck displaced fracture with fluid in the  fracture gap and edema/enhancement & surrounding marrow , soft tissues. Findings may reflect a healing fracture with possible superimposed  osteomyelitis/osteonecrosis. Right sacral and bilateral parasymphyseal pubic insufficiency fractures. RIGHT femoral head and LEFT greater trochanter nondisplaced fractures. S/p evaluation by Ortho  No plans for Surgery per Ortho  Recommendations for antibiotics. S/p Fluid aspiration by IR on 5/5  Cultures- pending . No organisms on GS  ( pt has been on Levaquin since 5/4 , may affect final culture results)    Chronic sacral & buttock wounds  Stable, D/w Wound Care    Diarrhea resolved    Morbid obesity   BMI 49.99    Plan  - Change to empiric Vancomyc, Cefepime IV  - Await intra op cultures  - Antibiotics for DC would be dependent on fluid culture results  -Septic arthritis/ acute OM would need to be treated for 6 weeks. Tea Vu is a 57 y.o. female with past medical history significant for chronic respiratory failure on 4 L of O2, COPD, CHF, morbid obesity, chronic pain, nonambulatory at baseline who presented to the ED for check of  decubitus wounds, concern for safety of care at home. Patient was recently discharged from the hospital on April 27 after treatment for CHF exacerbation. Patient has history of known decubitus ulcers, that has been followed up by home health nurse/wound care. Home health nurse had gone to  her residence to check on her, and noted several more superficial decubitus ulcers that required further evaluation.   Home health nurse was also extremely concerned about her living conditions. EMS reports patient's residence had massive amounts of clutter, difficult for them to maneuver through the house/room. Reportedly there were dead animals in the house? Patient had reported she is normally cared for by her daughter, but admits she no longer thinks her daughter can manage her care- \"it's too much for her. \"   Patient seen for concern of osteomyelitis of left hip as seen on imaging. CT abdomen pelvis report as follows  IMPRESSION  1. Displaced left femoral neck fracture deformity with aggressive erosive  changes and sclerosis along the fracture margin . Consider acute  osteomyelitis and/or septic arthritis given the clinical presentation. 2.  Bilateral nonobstructing nephrolithiasis. 3.  Anusha mesentery, nonspecific finding which can be seen with enteritis  etiologies (e.g., mesenteric panniculitis, enteritis, pancreatitis, etc.)     MRI as follows  FINDINGS:      Bone marrow: Left femoral neck displaced fracture. Small amount of complex fluid  signal within the fracture gap. Surrounding marrow and enhancement, with  abnormal marrow signal within the distal fragment margin, may reflect acute  osteomyelitis. Right sacral and bilateral parasymphyseal pubic insufficiency  fractures. Nondisplaced fracture in the posterior aspect of the left greater  trochanter (4-15). Curvilinear sclerosis in the right femoral head (4-19), may  reflect an age indeterminate subchondral fracture. Moderate bilateral pubic  symphysis arthropathy.     Joint fluid: Physiologic.     Tendons: Grossly intact.     Muscles: Diffuse marked atrophy, fatty infiltration, and patchy edema signal.     Neurovascular bundles: Within normal limits.     Articular cartilage: Full-thickness chondral loss and bony productive change in  the bilateral hips.  Moderate pubic symphysis arthropathy.     Soft tissues: Edema signal and enhancement surrounding the left femoral neck  fracture site.     IMPRESSION  1. Redemonstrated left femoral neck displaced fracture with fluid in the  fracture gap and edema/enhancement and surrounding marrow and soft tissues. Findings may reflect a healing fracture with possible superimposed  osteomyelitis/osteonecrosis. 2.  Right sacral and bilateral parasymphyseal pubic insufficiency fractures. 3.  RIGHT femoral head and LEFT greater trochanter nondisplaced fractures. Patient was seen by Ortho on consultation. No plans for surgery. Recommendation is for antibiotics  S/p Fluid aspiration by IR on 5/5  Cultures- pending . No organisms on GS  ( pt has been on Levaquin since 5/4 , may affect final culture results)  Patient seen today. States she feels better. Denies recent fall. She recalls falling over 2 years ago. She has been bedbound for the past 2 years.       Past Medical History:   Diagnosis Date    Acute on chronic respiratory failure with hypoxia and hypercapnia ([facility]) 11/20/2020    Atrial fibrillation ([facility] 11/4/2020    CHF (congestive heart failure) ([facility])     Diabetes ([facility]     Hypertension     Ill-defined condition     high cholesterol    Ill-defined condition     tachycardia    JOSLYN (obstructive sleep apnea) 11/20/2020    Other and unspecified symptoms and signs involving general sensations and perceptions     ruptured disc    S/P cardiac cath 11/4/2020    11/3/2020 nonobstructive disease     Past Surgical History:   Procedure Laterality Date    [place] COMPRE ELECTROPHYSIOL XM W/LEFT ATRIAL PACNG/REC N/A 11/23/2020    Lt Atrial Pace & Record During Ep Study performed by Alana Bonner MD at [facility] CARDIAC CATH [facility]    [place] COMPRE EP EVAL ABLTJ 3D MAPG TX SVT N/A 11/23/2020    ABLATION A-FLUTTER performed by Alana Bonner MD at [facility] CARDIAC CATH LAB     [address] ADD ON N/A 11/23/2020    Ablation Svt/Vt Add On performed by Alana Bonner MD at [facility] CARDIAC CATH LAB    [place] INTRACARDIAC ELECTROPHYSIOLOGIC 3D MAPPING N/A 11/23/2020    Ep 3d Mapping performed by Lazaro Hawkins MD at [facility] CARDIAC CATH LAB    [place] STIM/PACING HEART POST IV DRUG INFU N/A 11/23/2020    Drug Stimulation performed by Lazaro Hawkins MD at [facility] CARDIAC CATH LAB     Allergies   Allergen Reactions    Cefazolin Rash    Other Plant, Animal, Environmental Rash     Ivory soap caused rash on hands. Social History     Socioeconomic History    Marital status: SINGLE     Spouse name: Not on file    Number of children: Not on file    Years of education: Not on file    Highest education level: Not on file   Occupational History    Not on file   Tobacco Use    Smoking status: Former Smoker    Smokeless tobacco: Never Used   Substance and Sexual Activity    Alcohol use: No    Drug use: No    Sexual activity: Not on file   Other Topics Concern    Not on file   Social History Narrative    Not on file     Social Determinants of Health     Financial Resource Strain:     Difficulty of Paying Living Expenses: Not on file   Food Insecurity:     Worried About [address] in the Last Year: Not on file    Quita of Food in the Last Year: Not on file   Transportation Needs:     Lack of Transportation (Medical): Not on file    Lack of Transportation (Non-Medical):  Not on file   Physical Activity:     Days of Exercise per Week: Not on file    Minutes of Exercise per Session: Not on file   Stress:     Feeling of Stress : Not on file   Social Connections:     Frequency of Communication with Friends and Family: Not on file    Frequency of Social Gatherings with Friends and Family: Not on file    Attends Scientologist Services: Not on file    Active Member of Clubs or Organizations: Not on file    Attends Club or Organization Meetings: Not on file    Marital Status: Not on file   Intimate Partner Violence:     Fear of Current or Ex-Partner: Not on file    Emotionally Abused: Not on file    Physically Abused: Not on file   Tu Sexually Abused: Not on file   Housing Stability:     Unable to Pay for Housing in the Last Year: Not on file    Number of Places Lived in the Last Year: Not on file    Unstable Housing in the Last Year: Not on file     No family status information on file. Medication Documentation Review Audit    **Prior to Admission medications have not yet been reviewed for this encounter**         Review of Systems - Negative except those mentioned in H&P  PHYSICAL EXAM:  General:          Awake, cooperative, no acute distress    EENT:              EOMI. Anicteric sclerae. MMM  Resp:               CTA bilaterally, no wheezing or rales. No accessory muscle use  CV:                  Regular  rhythm,  No edema  GI:                   Soft, Non distended, Non tender. +Bowel sounds  Neurologic:      Alert and oriented X 3, normal speech,   Psych:             Good insight. Not anxious nor agitated  Skin:                No rashes. No jaundice.       Daneil Bence, MD Ellender Daring

## 2022-05-06 NOTE — PROGRESS NOTES
Hospitalist Progress Note    NAME: Ashley Pena   :  1960   MRN:  [id number]       Assessment / Plan:    Left hip fracture displaced POA age undetermined  Question left hip osteomyelitis POA  Bilateral buttock and sacral wounds POA  She reports bilateral severe leg pain when moved, especially left hip  She only reports trauma years ago, denies any recently  She is very vague in terms of how long this has been going on  Found on CT scan done in the emergency room rule out osteomyelitis  Original reason sent to the ED was because of worsening buttock/sacral wounds  MRI was done which is more suggestive of fracture and osteomyelitis  [facility] evaluated no surgical intervention planned  As needed oxycodone  wound care on case  S/p  aspiration/Bx. Of note by IR, F/u Bx results  Got  Vanco/zosyn in ED and then started on Levaquin. ID on case       Diarrhea at home-C Difficile not send     Possible UTI POA- IV Levaquin      Chronic hypoxic respiratory failure POA Baseline on 3 to 4 L nasal cannula oxygen  Chronic diastolic congestive heart failure POA  COPD POA  Pt is currently stable on 3-4LNC, her baseline requirement.    Recent admission  to 2022 with shortness of breath and hypoxia  Treated with a course of steroids, doxycycline, diuresis  Discharged home at baseline  Continue diuresis  With lasix  Currently off prednisone  Continue nebs and oxygen      Paroxysmal A.  Fib  Not on  AC as nosebleeds with aspirin.    Multiple prior cardioversion procedures  Continue metoprolol/amio as per cards  On xarelto per cards  Currently in normal sinus rhythm     Steroid induced hyperglycemia  Glycemic last admit on steroids   A1c level 5.4 on   Currently taking diabetes medicines at home    Hypertension controlled. Katerina Lr' PTA meds.      Morbid obesity/Impaired mobility/functional quadriplegia  Does not ambulate for over 2 years  With her daughter, having increasing difficulty caring for the patient    Hyponatremia-sodium 80 yesterday labs pending from today          DVT prophylaxis with xarelto     Code status:full code  NOK: Daughter  Recommended Disposition:SNF  Anticipated Discharge Date:  >48 hours        Subjective: Follow-up visit for hip pain   Patient seen and examined, chart was reviewed. Labs pending from today. She appears to be in pain needing  Increasing narcotic pain medications. She had biopsy/fluid aspiration from her hip joint yesterday  Cardiology started her on Xarelto and amiodarone today. Objective:     VITALS:   Last 24hrs VS reviewed since prior progress note.  Most recent are:  Patient Vitals for the past 24 hrs:   Temp Pulse Resp BP SpO2   05/06/22 0742 -- -- -- -- 98 %   05/06/22 0700 98.1 °F (36.7 °C) 91 21 (!) 136/49 94 %   05/06/22 0400 98.5 °F (36.9 °C) 81 23 (!) 122/51 94 %   05/06/22 0000 98.1 °F (36.7 °C) 76 21 (!) 119/52 95 %   05/05/22 2000 98.3 °F (36.8 °C) 76 22 (!) 114/45 97 %   05/05/22 1921 -- -- -- -- (!) 88 %   05/05/22 1600 -- 90 19 (!) 121/37 100 %   05/05/22 1555 -- 83 27 (!) 103/55 100 %   05/05/22 1550 -- 81 18 (!) 100/50 100 %   05/05/22 1545 -- 81 21 (!) 110/52 100 %   05/05/22 1540 -- 83 18 (!) 111/50 100 %   05/05/22 1535 -- 84 21 (!) 126/43 100 %   05/05/22 1530 -- 84 24 (!) 113/40 100 %   05/05/22 1520 -- 83 20 133/71 100 %   05/05/22 1450 -- 82 20 (!) 140/69 100 %   05/05/22 1231 -- -- -- -- 98 %   05/05/22 1200 -- 86 -- -- --   05/05/22 1137 98.3 °F (36.8 °C) 72 25 128/60 96 %       Intake/Output Summary (Last 24 hours) at 5/6/2022 0317  Last data filed at 5/6/2022 0400  Gross per 24 hour   Intake 480 ml   Output 500 ml   Net -20 ml        I had a face to face encounter, and independently examined this patient on 5/6/2022, as outlined below:    PHYSICAL EXAM:  General:           Chronic ill looking  HEENT:           Normocephalic Neck:                Short  Lungs:             No accessory muscle use or retractions. Heart:              Regular rate and rhythm 3+ LE edema  Abdomen:        Soft, non-tender. Obese    Skin:                bilateral feet scaly skin   neurologic:      Alert and oriented X 3, follows commands   Psych             normal affect                                Labs     I reviewed today's most current labs and imaging studies. Pertinent labs include:  Recent Labs     05/06/22  0353 05/05/22  0341 05/03/22  1600   WBC 12.1* 10.8 16.4*   HGB 9.5* 9.8* 12.2   HCT 32.4* 32.8* 39.8   * 125* 138*     Recent Labs     05/05/22  0341 05/04/22  0514 05/04/22  0453 05/03/22  1600   *  --  138 136   K 3.3*  --  3.3* 3.6   CL 91*  --  99 97   CO2 33*  --  35* 38*   *  --  152* 234*   BUN 13  --  17 22*   CREA 0.47*  --  0.44* 0.69   CA 8.0*  --  8.8 8.8   MG  --  2.2  --   --    ALB  --   --  2.4* 2.5*   TBILI  --   --  1.2* 1.0   ALT  --   --  44 52     MRI PELV W WO CONT    Result Date: 5/4/2022  1. Redemonstrated left femoral neck displaced fracture with fluid in the fracture gap and edema/enhancement and surrounding marrow and soft tissues. Findings may reflect a healing fracture with possible superimposed osteomyelitis/osteonecrosis. 2.  Right sacral and bilateral parasymphyseal pubic insufficiency fractures. 3.  RIGHT femoral head and LEFT greater trochanter nondisplaced fractures. CT BX BONE DP NDL/TROCAR    Result Date: 5/5/2022  Successful CT-guided lavage and aspiration of fluid from space between fracture fragments of the left hip. CT ABD PELV W CONT    Result Date: 5/3/2022  1. Displaced left femoral neck fracture deformity with aggressive erosive changes and sclerosis along the fracture margin (771-17). Consider acute osteomyelitis and/or septic arthritis given the clinical presentation. 2.  Bilateral nonobstructing nephrolithiasis.  3.  Anusha mesentery, nonspecific finding which can be seen with enteritis etiologies (e.g., mesenteric panniculitis, enteritis, pancreatitis, etc.)     XR CHEST PORT    Result Date: 5/3/2022  Improved congestion and pulmonary edema with possible left pleural effusion. CT BX BONE DP NDL/TROCAR    Result Date: 5/5/2022  Successful CT-guided lavage and aspiration of fluid from space between fracture fragments of the left hip.    04/21/22    ECHO ADULT COMPLETE 04/23/2022 4/23/2022    Interpretation Summary    Left Ventricle: Normal left ventricular systolic function with a visually estimated EF of 60 - 65%. Left ventricle size is normal. Normal wall thickness. Normal wall motion. Normal diastolic function.   Technical qualifiers: Echo study was technically difficult with poor endocardial visualization and technically difficult due to patient's body habitus.     Signed by: Jun Cross MD on 4/23/2022  6:39 AM       Current Medications:     Current Facility-Administered Medications:     morphine injection 1 mg, 1 mg, IntraVENous, Q4H PRN, SarahMichael MD, 1 mg at 05/06/22 0128    amiodarone (CORDARONE) tablet 400 mg, 400 mg, Oral, BID, MOISE Posada  [START ON 5/11/2022] amiodarone (CORDARONE) tablet 400 mg, 400 mg, Oral, DAILY, Pranay Stock NP    rivaroxaban (XARELTO) tablet 20 mg, 20 mg, Oral, DAILY, Pranay Stock NP    balsam peru-castor oiL (VENELEX) ointment, , Topical, BID, Vanesa Willams MD, Given at 05/06/22 0826    sodium chloride (NS) flush 5-40 mL, 5-40 mL, IntraVENous, Q8H, Albert Choe MD, 10 mL at 05/06/22 0654    sodium chloride (NS) flush 5-40 mL, 5-40 mL, IntraVENous, PRN, Albert Choe MD    acetaminophen (TYLENOL) tablet 650 mg, 650 mg, Oral, Q6H PRN, 650 mg at 05/05/22 2134 **OR** acetaminophen (TYLENOL) suppository 650 mg, 650 mg, Rectal, Q6H PRN, Albert Choe MD    polyethylene glycol (MIRALAX) packet 17 g, 17 g, Oral, DAILY, Albert Choe MD    bisacodyL (DULCOLAX) tablet 5 mg, 5 mg, Oral, DAILY PRN, Fran Choe MD    promethazine (PHENERGAN) tablet 12.5 mg, 12.5 mg, Oral, Q6H PRN **OR** ondansetron (ZOFRAN) injection 4 mg, 4 mg, IntraVENous, Q6H PRN, Fran Choe MD    oxyCODONE IR (ROXICODONE) tablet 5-10 mg, 5-10 mg, Oral, Q6H PRN, Tristan Mcgrath MD, 10 mg at 05/06/22 0654    albuterol-ipratropium (DUO-NEB) 2.5 MG-0.5 MG/3 ML, 3 mL, Nebulization, QID RT, Fran Choe MD, 3 mL at 05/06/22 2227    atorvastatin (LIPITOR) tablet 10 mg, 10 mg, Oral, QHS, Fran Choe MD, 10 mg at 05/05/22 2134    metoprolol tartrate (LOPRESSOR) tablet 25 mg, 25 mg, Oral, BID, Fran Choe MD, 25 mg at 05/06/22 9341    furosemide (LASIX) tablet 40 mg, 40 mg, Oral, DAILY, Fran Choe MD, 40 mg at 05/06/22 0824    levoFLOXacin (LEVAQUIN) 750 mg in D5W IVPB, 750 mg, IntraVENous, Q24H, Fran Choe MD, Last Rate: 100 mL/hr at 05/06/22 0654, 750 mg at 05/06/22 0654    [Held by provider] 0.9% sodium chloride infusion, 100 mL/hr, IntraVENous, CONTINUOUS, Randi Lawson MD, Last Rate: 100 mL/hr at 05/05/22 0325, 100 mL/hr at 05/05/22 0325    miconazole (SECURA) 2 % extra thick cream, , Topical, BID, Berny Lara MD, Given at 05/06/22 0841    ammonium lactate (LAC-HYDRIN) 12 % lotion, , Topical, BID, Berny Lara MD, Given at 05/06/22 8368       ________________________________________________________________________  Ara Root MD

## 2022-05-07 LAB
ALBUMIN SERPL-MCNC: 2.1 G/DL (ref 3.5–5)
ANION GAP SERPL CALC-SCNC: 5 MMOL/L (ref 5–15)
BASOPHILS # BLD: 0 K/UL (ref 0–0.1)
BASOPHILS NFR BLD: 0 % (ref 0–1)
BNP SERPL-MCNC: 4220 PG/ML
BUN SERPL-MCNC: 9 MG/DL (ref 6–20)
BUN/CREAT SERPL: 26 (ref 12–20)
CALCIUM SERPL-MCNC: 8.5 MG/DL (ref 8.5–10.1)
CHLORIDE SERPL-SCNC: 98 MMOL/L (ref 97–108)
CO2 SERPL-SCNC: 32 MMOL/L (ref 21–32)
CREAT SERPL-MCNC: 0.35 MG/DL (ref 0.55–1.02)
CRP SERPL-MCNC: 9.86 MG/DL (ref 0–0.6)
DIFFERENTIAL METHOD BLD: ABNORMAL
EOSINOPHIL # BLD: 0.2 K/UL (ref 0–0.4)
EOSINOPHIL NFR BLD: 1 % (ref 0–7)
ERYTHROCYTE [DISTWIDTH] IN BLOOD BY AUTOMATED COUNT: 16.1 % (ref 11.5–14.5)
ERYTHROCYTE [SEDIMENTATION RATE] IN BLOOD: 60 MM/HR (ref 0–30)
GLUCOSE SERPL-MCNC: 116 MG/DL (ref 65–100)
HCT VFR BLD AUTO: 35.8 % (ref 35–47)
HGB BLD-MCNC: 10.7 G/DL (ref 11.5–16)
IMM GRANULOCYTES # BLD AUTO: 0.1 K/UL (ref 0–0.04)
IMM GRANULOCYTES NFR BLD AUTO: 1 % (ref 0–0.5)
LYMPHOCYTES # BLD: 1.1 K/UL (ref 0.8–3.5)
LYMPHOCYTES NFR BLD: 9 % (ref 12–49)
MAGNESIUM SERPL-MCNC: 2.2 MG/DL (ref 1.6–2.4)
MCH RBC QN AUTO: 31.4 PG (ref 26–34)
MCHC RBC AUTO-ENTMCNC: 29.9 G/DL (ref 30–36.5)
MCV RBC AUTO: 105 FL (ref 80–99)
MONOCYTES # BLD: 0.7 K/UL (ref 0–1)
MONOCYTES NFR BLD: 6 % (ref 5–13)
NEUTS SEG # BLD: 10.1 K/UL (ref 1.8–8)
NEUTS SEG NFR BLD: 83 % (ref 32–75)
NRBC # BLD: 0 K/UL (ref 0–0.01)
NRBC BLD-RTO: 0 PER 100 WBC
PHOSPHATE SERPL-MCNC: 2.7 MG/DL (ref 2.6–4.7)
PLATELET # BLD AUTO: 148 K/UL (ref 150–400)
PMV BLD AUTO: 11.1 FL (ref 8.9–12.9)
POTASSIUM SERPL-SCNC: 3.7 MMOL/L (ref 3.5–5.1)
RBC # BLD AUTO: 3.41 M/UL (ref 3.8–5.2)
SODIUM SERPL-SCNC: 135 MMOL/L (ref 136–145)
WBC # BLD AUTO: 12.2 K/UL (ref 3.6–11)

## 2022-05-07 PROCEDURE — 85025 COMPLETE CBC W/AUTO DIFF WBC: CPT

## 2022-05-07 PROCEDURE — 74011250637 HC RX REV CODE- 250/637: Performed by: NURSE PRACTITIONER

## 2022-05-07 PROCEDURE — 94640 AIRWAY INHALATION TREATMENT: CPT

## 2022-05-07 PROCEDURE — 85652 RBC SED RATE AUTOMATED: CPT

## 2022-05-07 PROCEDURE — 74011250636 HC RX REV CODE- 250/636: Performed by: HOSPITALIST

## 2022-05-07 PROCEDURE — 94760 N-INVAS EAR/PLS OXIMETRY 1: CPT

## 2022-05-07 PROCEDURE — 86140 C-REACTIVE PROTEIN: CPT

## 2022-05-07 PROCEDURE — 65270000046 HC RM TELEMETRY

## 2022-05-07 PROCEDURE — 74011250636 HC RX REV CODE- 250/636: Performed by: INTERNAL MEDICINE

## 2022-05-07 PROCEDURE — 80069 RENAL FUNCTION PANEL: CPT

## 2022-05-07 PROCEDURE — 83735 ASSAY OF MAGNESIUM: CPT

## 2022-05-07 PROCEDURE — 74011000258 HC RX REV CODE- 258: Performed by: INTERNAL MEDICINE

## 2022-05-07 PROCEDURE — 74011250637 HC RX REV CODE- 250/637: Performed by: HOSPITALIST

## 2022-05-07 PROCEDURE — 74011000250 HC RX REV CODE- 250: Performed by: INTERNAL MEDICINE

## 2022-05-07 PROCEDURE — 74011250637 HC RX REV CODE- 250/637: Performed by: INTERNAL MEDICINE

## 2022-05-07 PROCEDURE — 36415 COLL VENOUS BLD VENIPUNCTURE: CPT

## 2022-05-07 PROCEDURE — 83880 ASSAY OF NATRIURETIC PEPTIDE: CPT

## 2022-05-07 PROCEDURE — 77010033678 HC OXYGEN DAILY

## 2022-05-07 RX ORDER — OXYCODONE HYDROCHLORIDE 5 MG/1
5-10 TABLET ORAL
Status: DISCONTINUED | OUTPATIENT
Start: 2022-05-07 | End: 2022-05-17 | Stop reason: HOSPADM

## 2022-05-07 RX ORDER — MORPHINE SULFATE 2 MG/ML
2 INJECTION, SOLUTION INTRAMUSCULAR; INTRAVENOUS ONCE
Status: COMPLETED | OUTPATIENT
Start: 2022-05-07 | End: 2022-05-07

## 2022-05-07 RX ORDER — MORPHINE SULFATE 2 MG/ML
2 INJECTION, SOLUTION INTRAMUSCULAR; INTRAVENOUS
Status: DISPENSED | OUTPATIENT
Start: 2022-05-07 | End: 2022-05-09

## 2022-05-07 RX ORDER — IPRATROPIUM BROMIDE AND ALBUTEROL SULFATE 2.5; .5 MG/3ML; MG/3ML
3 SOLUTION RESPIRATORY (INHALATION)
Status: DISCONTINUED | OUTPATIENT
Start: 2022-05-07 | End: 2022-05-17 | Stop reason: HOSPADM

## 2022-05-07 RX ADMIN — METOPROLOL TARTRATE 25 MG: 25 TABLET, FILM COATED ORAL at 08:53

## 2022-05-07 RX ADMIN — CASTOR OIL AND BALSAM, PERU: 788; 87 OINTMENT TOPICAL at 09:00

## 2022-05-07 RX ADMIN — SODIUM CHLORIDE, PRESERVATIVE FREE 10 ML: 5 INJECTION INTRAVENOUS at 05:19

## 2022-05-07 RX ADMIN — VANCOMYCIN HYDROCHLORIDE 1250 MG: 10 INJECTION, POWDER, LYOPHILIZED, FOR SOLUTION INTRAVENOUS at 14:55

## 2022-05-07 RX ADMIN — FUROSEMIDE 40 MG: 40 TABLET ORAL at 08:53

## 2022-05-07 RX ADMIN — Medication: at 09:00

## 2022-05-07 RX ADMIN — RIVAROXABAN 20 MG: 20 TABLET, FILM COATED ORAL at 08:53

## 2022-05-07 RX ADMIN — ACETAMINOPHEN 325MG 650 MG: 325 TABLET ORAL at 01:40

## 2022-05-07 RX ADMIN — MORPHINE SULFATE 2 MG: 2 INJECTION, SOLUTION INTRAMUSCULAR; INTRAVENOUS at 21:42

## 2022-05-07 RX ADMIN — OXYCODONE 10 MG: 5 TABLET ORAL at 11:21

## 2022-05-07 RX ADMIN — MICONAZOLE NITRATE: 20 CREAM TOPICAL at 21:38

## 2022-05-07 RX ADMIN — SODIUM CHLORIDE, PRESERVATIVE FREE 10 ML: 5 INJECTION INTRAVENOUS at 21:39

## 2022-05-07 RX ADMIN — MORPHINE SULFATE 1 MG: 2 INJECTION, SOLUTION INTRAMUSCULAR; INTRAVENOUS at 04:31

## 2022-05-07 RX ADMIN — SODIUM CHLORIDE, PRESERVATIVE FREE 10 ML: 5 INJECTION INTRAVENOUS at 14:00

## 2022-05-07 RX ADMIN — AMIODARONE HYDROCHLORIDE 400 MG: 200 TABLET ORAL at 17:48

## 2022-05-07 RX ADMIN — MICONAZOLE NITRATE: 20 CREAM TOPICAL at 09:00

## 2022-05-07 RX ADMIN — MORPHINE SULFATE 1 MG: 2 INJECTION, SOLUTION INTRAMUSCULAR; INTRAVENOUS at 08:52

## 2022-05-07 RX ADMIN — CEFEPIME HYDROCHLORIDE 2 G: 2 INJECTION, POWDER, FOR SOLUTION INTRAVENOUS at 21:34

## 2022-05-07 RX ADMIN — VANCOMYCIN HYDROCHLORIDE 1250 MG: 10 INJECTION, POWDER, LYOPHILIZED, FOR SOLUTION INTRAVENOUS at 01:44

## 2022-05-07 RX ADMIN — AMIODARONE HYDROCHLORIDE 400 MG: 200 TABLET ORAL at 08:53

## 2022-05-07 RX ADMIN — METOPROLOL TARTRATE 25 MG: 25 TABLET, FILM COATED ORAL at 17:48

## 2022-05-07 RX ADMIN — IPRATROPIUM BROMIDE AND ALBUTEROL SULFATE 3 ML: .5; 3 SOLUTION RESPIRATORY (INHALATION) at 07:27

## 2022-05-07 RX ADMIN — CASTOR OIL AND BALSAM, PERU: 788; 87 OINTMENT TOPICAL at 21:35

## 2022-05-07 RX ADMIN — MORPHINE SULFATE 2 MG: 2 INJECTION, SOLUTION INTRAMUSCULAR; INTRAVENOUS at 12:37

## 2022-05-07 RX ADMIN — ATORVASTATIN CALCIUM 10 MG: 10 TABLET, FILM COATED ORAL at 21:34

## 2022-05-07 RX ADMIN — MORPHINE SULFATE 2 MG: 2 INJECTION, SOLUTION INTRAMUSCULAR; INTRAVENOUS at 16:08

## 2022-05-07 RX ADMIN — CEFEPIME HYDROCHLORIDE 2 G: 2 INJECTION, POWDER, FOR SOLUTION INTRAVENOUS at 12:37

## 2022-05-07 RX ADMIN — CEFEPIME HYDROCHLORIDE 2 G: 2 INJECTION, POWDER, FOR SOLUTION INTRAVENOUS at 04:10

## 2022-05-07 RX ADMIN — Medication 1 CAPSULE: at 08:53

## 2022-05-07 RX ADMIN — OXYCODONE 10 MG: 5 TABLET ORAL at 14:43

## 2022-05-07 RX ADMIN — Medication: at 21:34

## 2022-05-07 RX ADMIN — IPRATROPIUM BROMIDE AND ALBUTEROL SULFATE 3 ML: .5; 3 SOLUTION RESPIRATORY (INHALATION) at 11:48

## 2022-05-07 RX ADMIN — MORPHINE SULFATE 2 MG: 2 INJECTION, SOLUTION INTRAMUSCULAR; INTRAVENOUS at 18:25

## 2022-05-07 NOTE — PROGRESS NOTES
Bedside shift change report given to Sera Aguiar RN (oncoming nurse) by Freya Colvin RN (offgoing nurse). Report included the following information SBAR, Kardex, ED Summary, Intake/Output, MAR and Cardiac Rhythm SA. Patient continues to complain of pain despite treatment with current pain medications. Patient states Katlyn does not work and morphine works but only for a short period of time. Messaged MD UTE modified orders. Wound care completed per orders. Patient still complaining of pain, messaged MD for additional medication. MD order 2 mg morphine x 1. This RN entered order. Bedside shift change report given to Yandy Rodas RN (oncoming nurse) by Sera Aguiar RN (offgoing nurse). Report included the following information SBAR, Kardex, ED Summary, Intake/Output, MAR and Cardiac Rhythm Sinus.

## 2022-05-07 NOTE — PROGRESS NOTES
Problem: Risk for Spread of Infection  Goal: Prevent transmission of infectious organism to others  Description: Prevent the transmission of infectious organisms to other patients, staff members, and visitors.   Outcome: Progressing Towards Goal     Problem: Patient Education:  Go to Education Activity  Goal: Patient/Family Education  Outcome: Progressing Towards Goal     Problem: Breathing Pattern - Ineffective  Goal: *Absence of hypoxia  Outcome: Progressing Towards Goal  Goal: *Use of effective breathing techniques  Outcome: Progressing Towards Goal

## 2022-05-07 NOTE — PROGRESS NOTES
End of Shift Note    Bedside shift change report given to Zoila Gaines RN (oncoming nurse) by Yanely Wilson RN (offgoing nurse). Report included the following information SBAR    Shift worked:  7p-7a     Shift summary and any significant changes:    2047: Roxicodone given for pain    2330: IV morphine given before moving patient, Patient cleaned/wound care to sacrum/buttocks patient turned to right side    0030: 22g IV placed right forearm    0140: Tylenol given for pain    0430: IV morphine given for pain in legs and hip. PBNP: 4,220     Concerns for physician to address:       Zone phone for oncoming shift:          Activity:  Activity Level: Bed Rest  Number times ambulated in hallways past shift: 0  Number of times OOB to chair past shift: 0    Cardiac:   Cardiac Monitoring: Yes      Cardiac Rhythm: Sinus Rhythm    Access:   Current line(s): PIV     Genitourinary:   Urinary status: voiding, incontinent and external catheter    Respiratory:   O2 Device: Nasal cannula  Chronic home O2 use?: YES  Incentive spirometer at bedside: YES       GI:  Last Bowel Movement Date:  (PTA)  Current diet:  ADULT DIET Easy to Chew  Passing flatus: YES  Tolerating current diet: YES       Pain Management:   Patient states pain is manageable on current regimen: NO    Skin:  Gaston Score: 12  Interventions: turn team, speciality bed, float heels, increase time out of bed, foam dressing, PT/OT consult, internal/external urinary devices and nutritional support     Patient Safety:  Fall Score:  Total Score: 2  Interventions: bed/chair alarm, gripper socks and stay with me (per policy)       Length of Stay:  Expected LOS: - - -  Actual LOS: 3      Yanely Wilson RN

## 2022-05-07 NOTE — PROGRESS NOTES
Hospitalist Progress Note    NAME: Jenny Franco   :  1960   MRN:  [id number]       Assessment / Plan:    Left hip fracture displaced POA age undetermined  ? left hip osteomyelitis POA  Bilateral buttock and sacral wounds POA  She reports bilateral severe leg pain when moved, especially left hip  She only reports trauma years ago, denies any recently  She is very vague in terms of how long this has been going on  Found on CT scan done in the emergency room rule out osteomyelitis  Original reason sent to the ED was because of worsening buttock/sacral wounds  MRI was done which is more suggestive of fracture and osteomyelitis  [facility] evaluated no surgical intervention planned  As needed oxycodone  wound care on case  S/p  aspiration/Bx. Of note by IR, F/u Bx results pending  Got  Vanco/zosyn in ED and then started on Levaquin. ID on case  Changed abx to vanc/cefepime      Diarrhea at home-C Difficile not send here     Possible UTI POA- IV abx per ID     Chronic hypoxic respiratory failure POA Baseline on 3 to 4 L nasal cannula oxygen  Chronic diastolic congestive heart failure POA  COPD POA  Pt is currently stable on 3-4LNC, her baseline requirement.    Recent admission  to 2022 with shortness of breath and hypoxia  Treated with a course of steroids, doxycycline, diuresis  Discharged home at baseline  Continue diuresis  With lasix  Currently off prednisone  Continue nebs and oxygen      Paroxysmal A.  Fib  Not on  AC as nosebleeds with aspirin.    Multiple prior cardioversion procedures  Continue metoprolol/amio as per cards  On xarelto per cards  Currently in normal sinus rhythm     Steroid induced hyperglycemia  Glycemic last admit on steroids   A1c level 5.4 on        Hypertension controlled. Georgette Juarez PTA meds.      Morbid obesity/Impaired mobility/functional quadriplegia  Does not ambulate for over 2 years  With her daughter, having increasing difficulty caring for the patient    Hyponatremia-resolved          DVT prophylaxis with xarelto     Code status:full code  NOK: Daughter  Recommended Disposition:SNF  Anticipated Discharge Date:  >48 hours        Subjective: Follow-up visit for hip pain   Patient seen and examined, chart was reviewed. Patient without any acute complaints at this time denies pain nausea vomiting diarrhea. Objective:     VITALS:   Last 24hrs VS reviewed since prior progress note. Most recent are:  Patient Vitals for the past 24 hrs:   Temp Pulse Resp BP SpO2   05/07/22 1110 97.9 °F (36.6 °C) 90 18 (!) 144/52 98 %   05/07/22 0728 -- -- -- -- 98 %   05/07/22 0700 97.6 °F (36.4 °C) 80 19 (!) 122/49 94 %   05/07/22 0328 98.4 °F (36.9 °C) 81 18 (!) 114/59 94 %   05/07/22 0202 -- -- -- -- 94 %   05/06/22 2320 98.3 °F (36.8 °C) 81 21 (!) 165/63 94 %   05/06/22 1957 98.2 °F (36.8 °C) 76 25 (!) 131/54 98 %   05/06/22 1948 -- -- -- -- 97 %   05/06/22 1527 98.8 °F (37.1 °C) (!) 116 30 (!) 148/51 92 %   05/06/22 1515 -- -- -- -- 98 %   05/06/22 1148 -- -- -- -- 97 %       Intake/Output Summary (Last 24 hours) at 5/7/2022 1113  Last data filed at 5/7/2022 7944  Gross per 24 hour   Intake --   Output 1900 ml   Net -1900 ml        I had a face to face encounter, and independently examined this patient on 5/7/2022, as outlined below:    PHYSICAL EXAM:  General:           Chronic ill looking  HEENT:           Normocephalic                  Neck:                Short  Lungs:             No accessory muscle use or retractions. Heart:              Regular rate and rhythm 3+ LE edema  Abdomen:        Soft, non-tender. Obese    Skin:                bilateral feet scaly skin   neurologic:      Alert and oriented X 3, follows commands   Psych             normal affect                                Labs     I reviewed today's most current labs and imaging studies.   Pertinent labs include:  Recent Labs     05/07/22  0428 05/06/22  0353 05/05/22  0341   WBC 12.2* 12.1* 10.8   HGB 10.7* 9.5* 9.8*   HCT 35.8 32.4* 32.8*   * 120* 125*     Recent Labs     05/07/22  0428 05/06/22  0931 05/05/22  0341   * 134* 128*   K 3.7 3.9 3.3*   CL 98 94* 91*   CO2 32 37* 33*   * 118* 422*   BUN 9 11 13   CREA 0.35* 0.47* 0.47*   CA 8.5 9.1 8.0*   MG 2.2  --   --    PHOS 2.7  --   --    ALB 2.1*  --   --      MRI PELV W WO CONT    Result Date: 5/4/2022  1. Redemonstrated left femoral neck displaced fracture with fluid in the fracture gap and edema/enhancement and surrounding marrow and soft tissues. Findings may reflect a healing fracture with possible superimposed osteomyelitis/osteonecrosis. 2.  Right sacral and bilateral parasymphyseal pubic insufficiency fractures. 3.  RIGHT femoral head and LEFT greater trochanter nondisplaced fractures. CT BX BONE DP NDL/TROCAR    Result Date: 5/5/2022  Successful CT-guided lavage and aspiration of fluid from space between fracture fragments of the left hip. CT ABD PELV W CONT    Result Date: 5/3/2022  1. Displaced left femoral neck fracture deformity with aggressive erosive changes and sclerosis along the fracture margin (258-51). Consider acute osteomyelitis and/or septic arthritis given the clinical presentation. 2.  Bilateral nonobstructing nephrolithiasis. 3.  Anusha mesentery, nonspecific finding which can be seen with enteritis etiologies (e.g., mesenteric panniculitis, enteritis, pancreatitis, etc.)     XR CHEST PORT    Result Date: 5/3/2022  Improved congestion and pulmonary edema with possible left pleural effusion. No results found. 04/21/22    ECHO ADULT COMPLETE 04/23/2022 4/23/2022    Interpretation Summary    Left Ventricle: Normal left ventricular systolic function with a visually estimated EF of 60 - 65%. Left ventricle size is normal. Normal wall thickness. Normal wall motion. Normal diastolic function.     Technical qualifiers: Echo study was technically difficult with poor endocardial visualization and technically difficult due to patient's body habitus.     Signed by: Elizabeth aBrnes MD on 4/23/2022  6:39 AM       Current Medications:     Current Facility-Administered Medications:     morphine injection 1 mg, 1 mg, IntraVENous, Q4H PRN, Sarah, Michael BUTCHER MD, 1 mg at 05/07/22 0518    amiodarone (CORDARONE) tablet 400 mg, 400 mg, Oral, BID, Henry Dill, NP, 400 mg at 05/07/22 0853    [START ON 5/11/2022] amiodarone (CORDARONE) tablet 400 mg, 400 mg, Oral, DAILY, Henry Dill, NP    rivaroxaban (XARELTO) tablet 20 mg, 20 mg, Oral, DAILY, Henry Dill, NP, 20 mg at 05/07/22 0853    [COMPLETED] cefepime (MAXIPIME) 2 g in 0.9% sodium chloride 10 mL IV syringe, 2 g, IntraVENous, NOW, 2 g at 05/06/22 1405 **FOLLOWED BY** cefepime (MAXIPIME) 2 g in 0.9% sodium chloride (MBP/ADV) 100 mL MBP, 2 g, IntraVENous, Q8H, Sam Mae MD, Last Rate: 25 mL/hr at 05/07/22 0410, 2 g at 05/07/22 0410    L.acidophilus-paracasei-S.thermophil-bifidobacter (RISAQUAD) 8 billion cell capsule, 1 Capsule, Oral, DAILY, Sam Mae MD, 1 Capsule at 05/07/22 0853    vancomycin (VANCOCIN) 1250 mg in  ml infusion, 1,250 mg, IntraVENous, Q12H, Sam Mae MD, Last Rate: 125 mL/hr at 05/07/22 0144, 1,250 mg at 05/07/22 0144    balsam peru-castor oiL (VENELEX) ointment, , Topical, BID, Neeru Blake MD, Given at 05/07/22 0900    sodium chloride (NS) flush 5-40 mL, 5-40 mL, IntraVENous, Q8H, Marissa Choe MD, 10 mL at 05/07/22 0519    sodium chloride (NS) flush 5-40 mL, 5-40 mL, IntraVENous, PRN, Marissa Choe MD    acetaminophen (TYLENOL) tablet 650 mg, 650 mg, Oral, Q6H PRN, 650 mg at 05/07/22 0140 **OR** acetaminophen (TYLENOL) suppository 650 mg, 650 mg, Rectal, Q6H PRN, Marissa Choe MD    polyethylene glycol (MIRALAX) packet 17 g, 17 g, Oral, DAILY, Marissa Choe MD [place] bisacodyL (DULCOLAX) tablet 5 mg, 5 mg, Oral, DAILY PRN, Ernst Choe MD    promethazine (PHENERGAN) tablet 12.5 mg, 12.5 mg, Oral, Q6H PRN **OR** ondansetron (ZOFRAN) injection 4 mg, 4 mg, IntraVENous, Q6H PRN, Ernst Choe MD    oxyCODONE IR (ROXICODONE) tablet 5-10 mg, 5-10 mg, Oral, Q6H PRN, Samuel Davis MD, 10 mg at 05/06/22 2047    albuterol-ipratropium (DUO-NEB) 2.5 MG-0.5 MG/3 ML, 3 mL, Nebulization, QID RT, Ernst Choe MD, 3 mL at 05/07/22 3595    atorvastatin (LIPITOR) tablet 10 mg, 10 mg, Oral, QHS, Ernst Choe MD, 10 mg at 05/06/22 2141    metoprolol tartrate (LOPRESSOR) tablet 25 mg, 25 mg, Oral, BID, Ernst Choe MD, 25 mg at 05/07/22 0853    furosemide (LASIX) tablet 40 mg, 40 mg, Oral, DAILY, Ernst Choe MD, 40 mg at 05/07/22 0853    [Held by provider] 0.9% sodium chloride infusion, 100 mL/hr, IntraVENous, CONTINUOUS, Randi Lawson MD, Last Rate: 100 mL/hr at 05/05/22 0325, 100 mL/hr at 05/05/22 0325    miconazole (SECURA) 2 % extra thick cream, , Topical, BID, Ian PEREZ MD, Given at 05/07/22 0900    ammonium lactate (LAC-HYDRIN) 12 % lotion, , Topical, BID, Jackie Garcia MD, Given at 05/07/22 0900       ________________________________________________________________________  Ashlee Davis MD

## 2022-05-08 LAB
BACTERIA SPEC CULT: NORMAL
DATE LAST DOSE: ABNORMAL
REPORTED DOSE,DOSE: ABNORMAL UNITS
REPORTED DOSE/TIME,TMG: ABNORMAL
SERVICE CMNT-IMP: NORMAL
VANCOMYCIN TROUGH SERPL-MCNC: 18.2 UG/ML (ref 5–10)

## 2022-05-08 PROCEDURE — 74011250637 HC RX REV CODE- 250/637: Performed by: INTERNAL MEDICINE

## 2022-05-08 PROCEDURE — 80202 ASSAY OF VANCOMYCIN: CPT

## 2022-05-08 PROCEDURE — 74011000250 HC RX REV CODE- 250: Performed by: INTERNAL MEDICINE

## 2022-05-08 PROCEDURE — 74011250637 HC RX REV CODE- 250/637: Performed by: NURSE PRACTITIONER

## 2022-05-08 PROCEDURE — 74011250636 HC RX REV CODE- 250/636: Performed by: HOSPITALIST

## 2022-05-08 PROCEDURE — 74011250636 HC RX REV CODE- 250/636: Performed by: INTERNAL MEDICINE

## 2022-05-08 PROCEDURE — 65270000046 HC RM TELEMETRY

## 2022-05-08 PROCEDURE — 74011000258 HC RX REV CODE- 258: Performed by: INTERNAL MEDICINE

## 2022-05-08 PROCEDURE — 36415 COLL VENOUS BLD VENIPUNCTURE: CPT

## 2022-05-08 PROCEDURE — 74011250637 HC RX REV CODE- 250/637: Performed by: HOSPITALIST

## 2022-05-08 PROCEDURE — 77010033678 HC OXYGEN DAILY

## 2022-05-08 RX ORDER — CYCLOBENZAPRINE HCL 10 MG
10 TABLET ORAL
Status: DISCONTINUED | OUTPATIENT
Start: 2022-05-08 | End: 2022-05-17 | Stop reason: HOSPADM

## 2022-05-08 RX ADMIN — RIVAROXABAN 20 MG: 20 TABLET, FILM COATED ORAL at 08:28

## 2022-05-08 RX ADMIN — MICONAZOLE NITRATE: 20 CREAM TOPICAL at 08:30

## 2022-05-08 RX ADMIN — OXYCODONE 10 MG: 5 TABLET ORAL at 23:52

## 2022-05-08 RX ADMIN — MORPHINE SULFATE 2 MG: 2 INJECTION, SOLUTION INTRAMUSCULAR; INTRAVENOUS at 05:12

## 2022-05-08 RX ADMIN — VANCOMYCIN HYDROCHLORIDE 1000 MG: 1 INJECTION, POWDER, LYOPHILIZED, FOR SOLUTION INTRAVENOUS at 14:20

## 2022-05-08 RX ADMIN — SODIUM CHLORIDE, PRESERVATIVE FREE 10 ML: 5 INJECTION INTRAVENOUS at 14:00

## 2022-05-08 RX ADMIN — SODIUM CHLORIDE, PRESERVATIVE FREE 10 ML: 5 INJECTION INTRAVENOUS at 22:47

## 2022-05-08 RX ADMIN — CEFEPIME HYDROCHLORIDE 2 G: 2 INJECTION, POWDER, FOR SOLUTION INTRAVENOUS at 05:12

## 2022-05-08 RX ADMIN — CYCLOBENZAPRINE 10 MG: 10 TABLET, FILM COATED ORAL at 14:20

## 2022-05-08 RX ADMIN — CEFEPIME HYDROCHLORIDE 2 G: 2 INJECTION, POWDER, FOR SOLUTION INTRAVENOUS at 22:43

## 2022-05-08 RX ADMIN — SODIUM CHLORIDE, PRESERVATIVE FREE 10 ML: 5 INJECTION INTRAVENOUS at 05:14

## 2022-05-08 RX ADMIN — MORPHINE SULFATE 2 MG: 2 INJECTION, SOLUTION INTRAMUSCULAR; INTRAVENOUS at 22:41

## 2022-05-08 RX ADMIN — CASTOR OIL AND BALSAM, PERU: 788; 87 OINTMENT TOPICAL at 22:46

## 2022-05-08 RX ADMIN — MICONAZOLE NITRATE: 20 CREAM TOPICAL at 22:47

## 2022-05-08 RX ADMIN — AMIODARONE HYDROCHLORIDE 400 MG: 200 TABLET ORAL at 08:27

## 2022-05-08 RX ADMIN — VANCOMYCIN HYDROCHLORIDE 1250 MG: 10 INJECTION, POWDER, LYOPHILIZED, FOR SOLUTION INTRAVENOUS at 02:52

## 2022-05-08 RX ADMIN — MORPHINE SULFATE 2 MG: 2 INJECTION, SOLUTION INTRAMUSCULAR; INTRAVENOUS at 18:47

## 2022-05-08 RX ADMIN — MORPHINE SULFATE 2 MG: 2 INJECTION, SOLUTION INTRAMUSCULAR; INTRAVENOUS at 12:57

## 2022-05-08 RX ADMIN — Medication: at 08:29

## 2022-05-08 RX ADMIN — ATORVASTATIN CALCIUM 10 MG: 10 TABLET, FILM COATED ORAL at 22:57

## 2022-05-08 RX ADMIN — CEFEPIME HYDROCHLORIDE 2 G: 2 INJECTION, POWDER, FOR SOLUTION INTRAVENOUS at 12:57

## 2022-05-08 RX ADMIN — FUROSEMIDE 40 MG: 40 TABLET ORAL at 08:28

## 2022-05-08 RX ADMIN — OXYCODONE 10 MG: 5 TABLET ORAL at 15:29

## 2022-05-08 RX ADMIN — METOPROLOL TARTRATE 25 MG: 25 TABLET, FILM COATED ORAL at 17:27

## 2022-05-08 RX ADMIN — AMIODARONE HYDROCHLORIDE 400 MG: 200 TABLET ORAL at 17:28

## 2022-05-08 RX ADMIN — METOPROLOL TARTRATE 25 MG: 25 TABLET, FILM COATED ORAL at 08:28

## 2022-05-08 RX ADMIN — CASTOR OIL AND BALSAM, PERU: 788; 87 OINTMENT TOPICAL at 08:30

## 2022-05-08 RX ADMIN — Medication 1 CAPSULE: at 08:28

## 2022-05-08 RX ADMIN — MORPHINE SULFATE 2 MG: 2 INJECTION, SOLUTION INTRAMUSCULAR; INTRAVENOUS at 08:27

## 2022-05-08 NOTE — PROGRESS NOTES
Hospitalist Progress Note    NAME: Jenny Franco   :  1960   MRN:  [id number]       Assessment / Plan:    Left hip fracture displaced POA age undetermined  ? left hip osteomyelitis POA  Bilateral buttock and sacral wounds POA  She reports bilateral severe leg pain when moved, especially left hip  She only reports trauma years ago, denies any recently  She is very vague in terms of how long this has been going on  Found on CT scan done in the emergency room rule out osteomyelitis  Original reason sent to the ED was because of worsening buttock/sacral wounds  MRI was done which is more suggestive of fracture and osteomyelitis  [facility] evaluated no surgical intervention planned  As needed oxycodone  wound care on case  S/p  aspiration/Bx. Of note by IR, F/u Bx results pending  Got  Vanco/zosyn in ED and then started on Levaquin. ID on case  Changed abx to vanc/cefepime      Diarrhea at home-C Difficile not send here     Possible UTI POA- IV abx per ID. NGTD on URx     Chronic hypoxic respiratory failure POA Baseline on 3 to 4 L nasal cannula oxygen  Chronic diastolic congestive heart failure POA  COPD POA  Pt is currently stable on 3-4LNC, her baseline requirement.    Recent admission  to 2022 with shortness of breath and hypoxia  Treated with a course of steroids, doxycycline, diuresis  Discharged home at baseline  Continue diuresis  With lasix  Currently off prednisone  Continue nebs and oxygen      Paroxysmal A.  Fib  Not on  AC as nosebleeds with aspirin.    Multiple prior cardioversion procedures  Continue metoprolol/amio as per cards  On xarelto per cards  Currently in normal sinus rhythm     Steroid induced hyperglycemia  Glycemic last admit on steroids   A1c level 5.4 on        Hypertension controlled. Georgette Black' PTA meds.      Morbid obesity/Impaired mobility/functional quadriplegia  Does not ambulate for over 2 years  With her daughter, having increasing difficulty caring for the patient        DVT prophylaxis with xarelto     Code status:full code  NOK: Daughter  Recommended Disposition:SNF  Anticipated Discharge Date:  >48 hours        Subjective: Follow-up visit for hip pain   Patient seen and examined, chart was reviewed. Patient continues to have pain issues but uncontrolled with p.o. oxycodone regarding morphine IV. No other acute issues reported to me by patient or staff at this time. Objective:     VITALS:   Last 24hrs VS reviewed since prior progress note. Most recent are:  Patient Vitals for the past 24 hrs:   Temp Pulse Resp BP SpO2   05/08/22 0702 98.5 °F (36.9 °C) 88 26 (!) 163/51 96 %   05/08/22 0300 98.7 °F (37.1 °C) 77 17 (!) 121/46 99 %   05/08/22 0000 98 °F (36.7 °C) 78 23 (!) 133/43 97 %   05/07/22 2300 -- 78 28 (!) 134/49 --   05/07/22 1900 98.3 °F (36.8 °C) 81 22 (!) 112/52 (!) 89 %   05/07/22 1505 98.8 °F (37.1 °C) 83 29 129/64 100 %   05/07/22 1149 -- -- -- -- 97 %   05/07/22 1110 97.9 °F (36.6 °C) 90 18 (!) 144/52 98 %       Intake/Output Summary (Last 24 hours) at 5/8/2022 1048  Last data filed at 5/8/2022 0518  Gross per 24 hour   Intake 300 ml   Output 850 ml   Net -550 ml        I had a face to face encounter, and independently examined this patient on 5/8/2022, as outlined below:    PHYSICAL EXAM:  General:           Chronic ill looking  HEENT:           Normocephalic                  Neck:                Short  Lungs:             No accessory muscle use or retractions. Heart:              Regular rate and rhythm 3+ LE edema  Abdomen:        Soft, non-tender. Obese    Skin:                bilateral feet scaly skin   neurologic:      Alert and oriented X 3, follows commands   Psych             normal affect                                Labs     I reviewed today's most current labs and imaging studies.   Pertinent labs include:  Recent Labs 05/07/22  0428 05/06/22  0353   WBC 12.2* 12.1*   HGB 10.7* 9.5*   HCT 35.8 32.4*   * 120*     Recent Labs     05/07/22  0428 05/06/22  0931   * 134*   K 3.7 3.9   CL 98 94*   CO2 32 37*   * 118*   BUN 9 11   CREA 0.35* 0.47*   CA 8.5 9.1   MG 2.2  --    PHOS 2.7  --    ALB 2.1*  --      MRI PELV W WO CONT    Result Date: 5/4/2022  1. Redemonstrated left femoral neck displaced fracture with fluid in the fracture gap and edema/enhancement and surrounding marrow and soft tissues. Findings may reflect a healing fracture with possible superimposed osteomyelitis/osteonecrosis. 2.  Right sacral and bilateral parasymphyseal pubic insufficiency fractures. 3.  RIGHT femoral head and LEFT greater trochanter nondisplaced fractures. CT BX BONE DP NDL/TROCAR    Result Date: 5/5/2022  Successful CT-guided lavage and aspiration of fluid from space between fracture fragments of the left hip. CT ABD PELV W CONT    Result Date: 5/3/2022  1. Displaced left femoral neck fracture deformity with aggressive erosive changes and sclerosis along the fracture margin (432-57). Consider acute osteomyelitis and/or septic arthritis given the clinical presentation. 2.  Bilateral nonobstructing nephrolithiasis. 3.  Anusha mesentery, nonspecific finding which can be seen with enteritis etiologies (e.g., mesenteric panniculitis, enteritis, pancreatitis, etc.)     XR CHEST PORT    Result Date: 5/3/2022  Improved congestion and pulmonary edema with possible left pleural effusion. No results found. 04/21/22    ECHO ADULT COMPLETE 04/23/2022 4/23/2022    Interpretation Summary    Left Ventricle: Normal left ventricular systolic function with a visually estimated EF of 60 - 65%. Left ventricle size is normal. Normal wall thickness. Normal wall motion. Normal diastolic function.     Technical qualifiers: Echo study was technically difficult with poor endocardial visualization and technically difficult due to patient's body habitus.     Signed by: Britt Ann MD on 4/23/2022  6:39 AM       Current Medications:     Current Facility-Administered Medications:     vancomycin (VANCOCIN) 1,000 mg in 0.9% sodium chloride 250 mL (VIAL-MATE), 1,000 mg, IntraVENous, Q12H, Michael Smith MD    oxyCODONE IR (ROXICODONE) tablet 5-10 mg, 5-10 mg, Oral, Q4H PRN, Michael Smith MD, 10 mg at 05/07/22 1443    morphine injection 2 mg, 2 mg, IntraVENous, Q4H PRN, SarahMichael lui MD, 2 mg at 05/08/22 0827    albuterol-ipratropium (DUO-NEB) 2.5 MG-0.5 MG/3 ML, 3 mL, Nebulization, Q4H PRN, Michael Smith MD    amiodarone (CORDARONE) tablet 400 mg, 400 mg, Oral, BID, Carter Peralta NP, 400 mg at 05/08/22 0827    [START ON 5/11/2022] amiodarone (CORDARONE) tablet 400 mg, 400 mg, Oral, DAILY, Carter Peralta NP    rivaroxaban (XARELTO) tablet 20 mg, 20 mg, Oral, DAILY, Carter Peralta NP, 20 mg at 05/08/22 0828    [COMPLETED] cefepime (MAXIPIME) 2 g in 0.9% sodium chloride 10 mL IV syringe, 2 g, IntraVENous, NOW, 2 g at 05/06/22 1405 **FOLLOWED BY** cefepime (MAXIPIME) 2 g in 0.9% sodium chloride (MBP/ADV) 100 mL MBP, 2 g, IntraVENous, Q8H, Mario Mae MD, Last Rate: 25 mL/hr at 05/08/22 0512, 2 g at 05/08/22 0512    L.acidophilus-paracasei-S.thermophil-bifidobacter (RISAQUAD) 8 billion cell capsule, 1 Capsule, Oral, DAILY, Mario Mae MD, 1 Capsule at 05/08/22 6415    balsam peru-castor oiL (VENELEX) ointment, , Topical, BID, Neeru Blake MD, Given at 05/08/22 0830    sodium chloride (NS) flush 5-40 mL, 5-40 mL, IntraVENous, Q8H, Vicenta Choe MD, 10 mL at 05/08/22 0514    sodium chloride (NS) flush 5-40 mL, 5-40 mL, IntraVENous, PRN, Vicenta Choe MD    acetaminophen (TYLENOL) tablet 650 mg, 650 mg, Oral, Q6H PRN, 650 mg at 05/07/22 0140 **OR** acetaminophen (TYLENOL) suppository 650 mg, 650 mg, Rectal, Q6H PRN, Vicenta Choe MD    polyethylene glycol (MIRALAX) packet 17 g, 17 g, Oral, DAILY, Aimee Gomez MD    bisacodyL (DULCOLAX) tablet 5 mg, 5 mg, Oral, DAILY PRN, Patricia Choe MD    promethazine (PHENERGAN) tablet 12.5 mg, 12.5 mg, Oral, Q6H PRN **OR** ondansetron (ZOFRAN) injection 4 mg, 4 mg, IntraVENous, Q6H PRN, Patricia Choe MD    atorvastatin (LIPITOR) tablet 10 mg, 10 mg, Oral, QHS, Patricia Choe MD, 10 mg at 05/07/22 2134    metoprolol tartrate (LOPRESSOR) tablet 25 mg, 25 mg, Oral, BID, Patricia Choe MD, 25 mg at 05/08/22 2537    furosemide (LASIX) tablet 40 mg, 40 mg, Oral, DAILY, Patricia Choe MD, 40 mg at 05/08/22 0964    [Held by provider] 0.9% sodium chloride infusion, 100 mL/hr, IntraVENous, CONTINUOUS, Tricia PEREZ MD, Last Rate: 100 mL/hr at 05/05/22 0325, 100 mL/hr at 05/05/22 0325    miconazole (SECURA) 2 % extra thick cream, , Topical, BID, Tricia PEREZ MD, Given at 05/08/22 0830    ammonium lactate (LAC-HYDRIN) 12 % lotion, , Topical, BID, Jose Lui MD, Given at 05/08/22 1163       ________________________________________________________________________  Yves Brooks MD

## 2022-05-08 NOTE — PROGRESS NOTES
Pharmacy Antimicrobial Kinetic Dosing    Indication for Antimicrobials: bone and joint infection     Current Regimen of Each Antimicrobial:  Vancomycin 1000mg IV q12h - started 5/6 day 3  Cefepime 2gm IV q8h - started 5/6 day 3    Previous Antimicrobial Therapy:  LQ + Zosyn IV x1 56    Vancomycin Goal Level: AUC: 400-600 mg/hr/Liter/day    Date Dose & Interval Measured (mcg/mL) Predicted AUC/SUDHA    1250mg IV q12h 18.2 601                 Dosing calculator used: [employer] calcLoopd Via    Significant Positive Cultures:   -    Conditions for Dosing Consideration: obese    Labs:  Recent Labs     22  0428 22  0931   CREA 0.35* 0.47*   BUN 9 11     Recent Labs     22  0428 22  0353   WBC 12.2* 12.1*     Temp (24hrs), Av.4 °F (36.9 °C), Min:97.9 °F (36.6 °C), Max:98.8 °F (37.1 °C)    Creatinine Clearance (mL/min):   CrCl (Ideal Body Weight): 68.9  If actual weight < IBW: CrCl (Actual Body Weight) 169.8    Impression/Plan:   ID consulted/following  WBC still slightly elevated, afebrile, SCr stable, CX ng pending  Vancomycin trough of 18.2 slightly supratherapeutic. Decrease vancomycin to 1000 mg q12h for projected AUC of 481 and trough of 14.3  Continue Cefepime  Antimicrobial stop date - pending     Pharmacy will follow daily and adjust medications as appropriate for renal function and/or serum levels.     Thank you,  Cydney Caldwell, PHARMD

## 2022-05-08 NOTE — PROGRESS NOTES
Bedside shift change report given to MARIE CHILDS (oncoming nurse) by Samuel Archuleta RN (offgoing nurse). Report included the following information SBAR, Kardex, ED Summary, Intake/Output, MAR and Cardiac Rhythm sinus. Patient complaining of leg spasms, messaged MD for flexeril, MD messaged back for 10 mg q8 prn. This RN entered order      Bedside shift change report given to MARIE Galvan (oncoming nurse) by MARIE CHILDS (offgoing nurse). Report included the following information SBAR, Kardex, ED Summary, Intake/Output, MAR and Cardiac Rhythm sinus.

## 2022-05-09 ENCOUNTER — TELEPHONE (OUTPATIENT)
Dept: INFECTIOUS DISEASES | Age: 62
End: 2022-05-09

## 2022-05-09 LAB
ALBUMIN SERPL-MCNC: 1.9 G/DL (ref 3.5–5)
ANION GAP SERPL CALC-SCNC: 2 MMOL/L (ref 5–15)
BACTERIA SPEC CULT: NORMAL
BACTERIA SPEC CULT: NORMAL
BASOPHILS # BLD: 0 K/UL (ref 0–0.1)
BASOPHILS NFR BLD: 0 % (ref 0–1)
BUN SERPL-MCNC: 8 MG/DL (ref 6–20)
BUN/CREAT SERPL: 22 (ref 12–20)
CALCIUM SERPL-MCNC: 8.6 MG/DL (ref 8.5–10.1)
CHLORIDE SERPL-SCNC: 99 MMOL/L (ref 97–108)
CO2 SERPL-SCNC: 37 MMOL/L (ref 21–32)
CREAT SERPL-MCNC: 0.36 MG/DL (ref 0.55–1.02)
CRP SERPL-MCNC: 8.65 MG/DL (ref 0–0.6)
DIFFERENTIAL METHOD BLD: ABNORMAL
EOSINOPHIL # BLD: 0.3 K/UL (ref 0–0.4)
EOSINOPHIL NFR BLD: 3 % (ref 0–7)
ERYTHROCYTE [DISTWIDTH] IN BLOOD BY AUTOMATED COUNT: 16.2 % (ref 11.5–14.5)
GLUCOSE SERPL-MCNC: 116 MG/DL (ref 65–100)
GRAM STN SPEC: NORMAL
GRAM STN SPEC: NORMAL
HCT VFR BLD AUTO: 34.9 % (ref 35–47)
HGB BLD-MCNC: 10.2 G/DL (ref 11.5–16)
IMM GRANULOCYTES # BLD AUTO: 0.1 K/UL (ref 0–0.04)
IMM GRANULOCYTES NFR BLD AUTO: 0 % (ref 0–0.5)
LYMPHOCYTES # BLD: 1.2 K/UL (ref 0.8–3.5)
LYMPHOCYTES NFR BLD: 10 % (ref 12–49)
MAGNESIUM SERPL-MCNC: 2.2 MG/DL (ref 1.6–2.4)
MCH RBC QN AUTO: 31.2 PG (ref 26–34)
MCHC RBC AUTO-ENTMCNC: 29.2 G/DL (ref 30–36.5)
MCV RBC AUTO: 106.7 FL (ref 80–99)
MONOCYTES # BLD: 0.5 K/UL (ref 0–1)
MONOCYTES NFR BLD: 4 % (ref 5–13)
NEUTS SEG # BLD: 9.5 K/UL (ref 1.8–8)
NEUTS SEG NFR BLD: 82 % (ref 32–75)
NRBC # BLD: 0 K/UL (ref 0–0.01)
NRBC BLD-RTO: 0 PER 100 WBC
PHOSPHATE SERPL-MCNC: 2.4 MG/DL (ref 2.6–4.7)
PLATELET # BLD AUTO: 146 K/UL (ref 150–400)
PMV BLD AUTO: 11.2 FL (ref 8.9–12.9)
POTASSIUM SERPL-SCNC: 3.6 MMOL/L (ref 3.5–5.1)
RBC # BLD AUTO: 3.27 M/UL (ref 3.8–5.2)
SERVICE CMNT-IMP: NORMAL
SERVICE CMNT-IMP: NORMAL
SODIUM SERPL-SCNC: 138 MMOL/L (ref 136–145)
WBC # BLD AUTO: 11.5 K/UL (ref 3.6–11)

## 2022-05-09 PROCEDURE — 80069 RENAL FUNCTION PANEL: CPT

## 2022-05-09 PROCEDURE — 77010033678 HC OXYGEN DAILY

## 2022-05-09 PROCEDURE — 99233 SBSQ HOSP IP/OBS HIGH 50: CPT | Performed by: INTERNAL MEDICINE

## 2022-05-09 PROCEDURE — 85025 COMPLETE CBC W/AUTO DIFF WBC: CPT

## 2022-05-09 PROCEDURE — 74011250637 HC RX REV CODE- 250/637: Performed by: INTERNAL MEDICINE

## 2022-05-09 PROCEDURE — 74011250637 HC RX REV CODE- 250/637: Performed by: NURSE PRACTITIONER

## 2022-05-09 PROCEDURE — 74011250636 HC RX REV CODE- 250/636: Performed by: HOSPITALIST

## 2022-05-09 PROCEDURE — 74011250637 HC RX REV CODE- 250/637: Performed by: HOSPITALIST

## 2022-05-09 PROCEDURE — 86140 C-REACTIVE PROTEIN: CPT

## 2022-05-09 PROCEDURE — 36415 COLL VENOUS BLD VENIPUNCTURE: CPT

## 2022-05-09 PROCEDURE — 74011000258 HC RX REV CODE- 258: Performed by: INTERNAL MEDICINE

## 2022-05-09 PROCEDURE — 74011250636 HC RX REV CODE- 250/636: Performed by: INTERNAL MEDICINE

## 2022-05-09 PROCEDURE — 65270000046 HC RM TELEMETRY

## 2022-05-09 PROCEDURE — 74011000250 HC RX REV CODE- 250: Performed by: INTERNAL MEDICINE

## 2022-05-09 PROCEDURE — 83735 ASSAY OF MAGNESIUM: CPT

## 2022-05-09 RX ADMIN — Medication: at 09:03

## 2022-05-09 RX ADMIN — Medication 1 CAPSULE: at 09:01

## 2022-05-09 RX ADMIN — MORPHINE SULFATE 2 MG: 2 INJECTION, SOLUTION INTRAMUSCULAR; INTRAVENOUS at 05:00

## 2022-05-09 RX ADMIN — MICONAZOLE NITRATE: 20 CREAM TOPICAL at 20:46

## 2022-05-09 RX ADMIN — ACETAMINOPHEN 325MG 650 MG: 325 TABLET ORAL at 22:52

## 2022-05-09 RX ADMIN — OXYCODONE 5 MG: 5 TABLET ORAL at 20:45

## 2022-05-09 RX ADMIN — CEFEPIME HYDROCHLORIDE 2 G: 2 INJECTION, POWDER, FOR SOLUTION INTRAVENOUS at 05:00

## 2022-05-09 RX ADMIN — METOPROLOL TARTRATE 25 MG: 25 TABLET, FILM COATED ORAL at 17:46

## 2022-05-09 RX ADMIN — RIVAROXABAN 20 MG: 20 TABLET, FILM COATED ORAL at 09:02

## 2022-05-09 RX ADMIN — CEFEPIME HYDROCHLORIDE 2 G: 2 INJECTION, POWDER, FOR SOLUTION INTRAVENOUS at 13:42

## 2022-05-09 RX ADMIN — AMIODARONE HYDROCHLORIDE 400 MG: 200 TABLET ORAL at 17:46

## 2022-05-09 RX ADMIN — VANCOMYCIN HYDROCHLORIDE 1000 MG: 1 INJECTION, POWDER, LYOPHILIZED, FOR SOLUTION INTRAVENOUS at 13:42

## 2022-05-09 RX ADMIN — OXYCODONE 10 MG: 5 TABLET ORAL at 06:45

## 2022-05-09 RX ADMIN — MICONAZOLE NITRATE: 20 CREAM TOPICAL at 09:03

## 2022-05-09 RX ADMIN — SODIUM CHLORIDE, PRESERVATIVE FREE 10 ML: 5 INJECTION INTRAVENOUS at 05:01

## 2022-05-09 RX ADMIN — AMIODARONE HYDROCHLORIDE 400 MG: 200 TABLET ORAL at 09:01

## 2022-05-09 RX ADMIN — SODIUM CHLORIDE, PRESERVATIVE FREE 10 ML: 5 INJECTION INTRAVENOUS at 21:07

## 2022-05-09 RX ADMIN — Medication: at 17:46

## 2022-05-09 RX ADMIN — METOPROLOL TARTRATE 25 MG: 25 TABLET, FILM COATED ORAL at 09:02

## 2022-05-09 RX ADMIN — OXYCODONE 10 MG: 5 TABLET ORAL at 11:35

## 2022-05-09 RX ADMIN — CEFEPIME HYDROCHLORIDE 2 G: 2 INJECTION, POWDER, FOR SOLUTION INTRAVENOUS at 20:40

## 2022-05-09 RX ADMIN — VANCOMYCIN HYDROCHLORIDE 1000 MG: 1 INJECTION, POWDER, LYOPHILIZED, FOR SOLUTION INTRAVENOUS at 01:40

## 2022-05-09 RX ADMIN — CASTOR OIL AND BALSAM, PERU: 788; 87 OINTMENT TOPICAL at 20:46

## 2022-05-09 RX ADMIN — CYCLOBENZAPRINE 10 MG: 10 TABLET, FILM COATED ORAL at 05:00

## 2022-05-09 RX ADMIN — ATORVASTATIN CALCIUM 10 MG: 10 TABLET, FILM COATED ORAL at 21:06

## 2022-05-09 RX ADMIN — FUROSEMIDE 40 MG: 40 TABLET ORAL at 09:02

## 2022-05-09 RX ADMIN — CASTOR OIL AND BALSAM, PERU: 788; 87 OINTMENT TOPICAL at 09:03

## 2022-05-09 NOTE — PROGRESS NOTES
End of Shift Note    Bedside shift change report given to Juan Francisco Contreras RN (oncoming nurse) by Nate Alicea RN (offgoing nurse). Report included the following information SBAR, Kardex, MAR, Recent Results and Med Rec Status    Shift worked:  7pm-7a     Shift summary and any significant changes:     wound care completed at 4530   Concerns for physician to address: none     Zone phone for oncoming shift:         Activity:  Activity Level: Bed Rest  Number times ambulated in hallways past shift: 0  Number of times OOB to chair past shift: 0    Cardiac:   Cardiac Monitoring: Yes      Cardiac Rhythm: Sinus Rhythm    Access:   Current line(s): PIV     Genitourinary:   Urinary status: external catheter    Respiratory:   O2 Device: Nasal cannula  Chronic home O2 use?: YES  Incentive spirometer at bedside: NO       GI:  Last Bowel Movement Date:  (PTA)  Current diet:  ADULT DIET Easy to Chew  Passing flatus: YES  Tolerating current diet: YES       Pain Management:   Patient states pain is manageable on current regimen: YES    Skin:  Gaston Score: 13  Interventions: turn team, speciality bed, float heels, PT/OT consult and limit briefs    Patient Safety:  Fall Score:  Total Score: 2  Interventions: bed/chair alarm, gripper socks and pt to call before getting OOB       Length of Stay:  Expected LOS: - - -  Actual LOS: 5      Nate Alicea RN

## 2022-05-09 NOTE — PROGRESS NOTES
Kimberly Osorio RN completed patient buttocks wound care with assistance from turn team. Patient tolerated wound care well.

## 2022-05-09 NOTE — PROGRESS NOTES
Music Therapy Assessment  [address] [place] [address] [id number]     1960  57 y.o.  female    Patient Telephone Number: [phone] (home)   Restorationist Affiliation: [facility]   Language: English   Patient Active Problem List    Diagnosis Date Noted    Pain in left hip 05/06/2022    Hip osteomyelitis ([facility] 05/04/2022    Chronic pain 04/21/2022    Dependence on supplemental oxygen 04/21/2022    Essential hypertension 04/21/2022    Heart failure ([address] 04/21/2022    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    Sciatica 04/21/2022    Hypoxia 04/21/2022    COPD exacerbation ([address] 04/21/2022    CHF exacerbation ([address] 04/21/2022    SOB (shortness of breath) 12/04/2020    Physical deconditioning 12/04/2020    Counseling regarding advance care planning and goals of care 12/04/2020    Atrial flutter with rapid ventricular response ([address] 11/20/2020    JOSLYN (obstructive sleep apnea) 11/20/2020    Acute on chronic respiratory failure with hypoxia and hypercapnia ([address] 11/20/2020    Acute respiratory failure ([address] 11/18/2020    Mixed hyperlipidemia 11/15/2020    Morbid obesity ([address] 11/15/2020    A-fib ([address] 11/04/2020    S/P cardiac cath 11/04/2020    COVID-19 ruled out 07/24/2020        Date: 5/9/2022            Total Time (in minutes): 40          MRM 2 PROGRESSIVE CARE    Mental Status:   [x] Alert [  ] Anahi Coby [  ]  Confused  [  ] Minimally responsive  [  ] Sleeping    Communication Status: [  ] Impaired Speech [  ] Nonverbal -N/A    Physical Status:   [x] Oxygen in use  [  ] Hard of Hearing [  ] Vision Impaired  [  ] Ambulatory  [  ] Ambulatory with assistance [  ] Non-ambulatory     Music Preferences, Background: Country and classic rock. Likes artists like Farhad Gallegos, and Pushpa. Clinical Problem addressed: Emotional support, positive social interaction.     Goal(s) met in session:  Physical/Pain management (Scale of 1-10):    Pre-session rating: Pt denied pain except for in her ankle and toe. She rated this pain at a 15 out of 20. Post-session rating: Pt said the music lessened her pain, but didn't rate this. [  ] Increased relaxation   [  ] Affected breathing patterns  [  ] Decreased muscle tension   [  ] Decreased agitation  [  ] Affected heart rate    [  ] Increased alertness     Emotional/Psychological:  [x] Increased self-expression   [  ] Decreased aggressive behavior   [  ] Decreased feelings of stress  [  ] Discussed healthy coping skills     [  ] Improved mood    [  ] Decreased withdrawn behavior     Social:  [  ] Decreased feelings of isolation/loneliness [x] Positive social interaction   [  ] Provided support and/or comfort for family/friends    Spiritual:  [  ] Spiritual support    [  ] Expressed peace  [  ] Expressed rosalina    [  ] Discussed beliefs    Techniques Utilized (Check all that apply):   [  ] Procedural support MT [  ] Music for relaxation [x] Patient preferred music  [  ] Rema analysis  [  ] Song choice  [  ] Music for validation  [  ] Entrainment  [  ] Movement to music [  ] Guided visualization  [  ] Margene Alert  [  ] Patient instrument playing [  ] Marta Slot writing  [  ] Chong Gerard along   [  ] Maribell Lower  [  ] Sensory stimulation  [  ] Active Listening  [  ] Music for spiritual support [  ] Making of CDs as gifts    Session Observations:  Referral from Ludwig Alvarez, Palliative NP. Patient (pt) was alert lying in bed. This music therapist (MT) asked the pt how she was feeling and pt shared. MT then asked pt about her music preferences and music background and pt shared about these. MT sang and played the [employer] song The Greatest with naylagilbertopeggy. Pt actively listened, smiled and nodded her head in response to the storytelling lyrics. She increased self-expression, as evidenced by (AEB) sharing about her family. MT provided active listening and words of support. Pt chose to hear an [employer] next. MT sang and played [address] with guitar. Pt expressed enjoyment in the music. MT suggested songs by several different artists within the pt's stated music preferences. Pt chose to hear a Dominik [employer]. MT sang and played [place] with olga. Pt's affect brightened in response to the music and she expressed gratitude for the session.     ANDRES SalgadoBC (Music Therapist-Board Certified)  Spiritual Care Department  Referral-based service

## 2022-05-09 NOTE — PROGRESS NOTES
Bedside shift change report given to Ash Gibbs RN (oncoming nurse) by Fahad Nash RN (offgoing nurse). Report included the following information SBAR, Kardex, ED Summary, Intake/Output, MAR and Cardiac Rhythm sinus. ]    Bedside shift change report given to Lynne Villareal RN (oncoming nurse) by Ash Gibbs RN (offgoing nurse). Report included the following information SBAR, Kardex, ED Summary, Intake/Output, MAR and Cardiac Rhythm sinus.

## 2022-05-09 NOTE — PROGRESS NOTES
Infectious Disease progress      Impression  Chronic displaced femoral neck fracture of the left hip  Possible Osteomyelitis of the left hip  Nonambulatory for greater than 2 years  MRI - pelvis-L femoral neck displaced fracture with fluid in the  fracture gap and edema/enhancement & surrounding marrow , soft tissues. Findings may reflect a healing fracture with possible superimposed  osteomyelitis/osteonecrosis. Right sacral and bilateral parasymphyseal pubic insufficiency fractures. RIGHT femoral head and LEFT greater trochanter nondisplaced fractures. S/p evaluation by Ortho  No plans for Surgery per Ortho  Recommendations for antibiotics. S/p Fluid aspiration by IR on 5/5  Cultures-NG. No organisms on GS  ( pt has been on Levaquin since 5/4 , may affect final culture results)    Chronic sacral & buttock wounds  Stable, D/w Wound Care    Diarrhea resolved    Morbid obesity   BMI 49.99    Plan  - Change to empiric Vancomyc, Cefepime IV. -Recommend Daptomycin to minimize nephrotoxicity  -If approved give 1 dose prior to discharge  - Antibiotics for DC would be dependent on fluid culture results  -Septic arthritis/ acute OM would need to be treated for 6 weeks. Antibiotic orders for discharge  Daptomycin 8 mg/kg IV every 24 hours & cefepime 2 g IV every 8 hourly end date 6/16  Weekly CBC, CMP, CK & ESR/CRP every other week fax reports to [phone], call with critical labs at Counts include 234 beds at the [facility] at end of therapy on 6/16  No statin while on Daptomycin  Adequate fluids, daily probiotic/yogurt  ID [id number]-ic-[id number] on 6/30 at 330      Possible adverse effects of long term antibiotics are inclusive of but not limited to following  BM suppression, neutropenia , cytopenias , aplastic anemia hemorrhage liver & renal dysfunction/ liver , renal failure  , GI dysfunction- N, V  Diarrhea,C.difficile disease, rash , allergy , anaphylaxis. toxic epidermal necrolysis  Neuro toxicity , seizure disorder  Side effects tend to be more pronounced in the elderly. D/w pt. Pt seen today. Afebrile. Denies complaints. Loan Sutton is a 57 y.o. female with past medical history significant for chronic respiratory failure on 4 L of O2, COPD, CHF, morbid obesity, chronic pain, nonambulatory at baseline who presented to the ED for check of  decubitus wounds, concern for safety of care at home. Patient was recently discharged from the hospital on April 27 after treatment for CHF exacerbation. Patient has history of known decubitus ulcers, that has been followed up by home health nurse/wound care. Home health nurse had gone to  her residence to check on her, and noted several more superficial decubitus ulcers that required further evaluation. Home health nurse was also extremely concerned about her living conditions. EMS reports patient's residence had massive amounts of clutter, difficult for them to maneuver through the house/room. Reportedly there were dead animals in the house? Patient had reported she is normally cared for by her daughter, but admits she no longer thinks her daughter can manage her care- \"it's too much for her. \"   Patient seen for concern of osteomyelitis of left hip as seen on imaging. CT abdomen pelvis report as follows  IMPRESSION  1. Displaced left femoral neck fracture deformity with aggressive erosive  changes and sclerosis along the fracture margin . Consider acute  osteomyelitis and/or septic arthritis given the clinical presentation. 2.  Bilateral nonobstructing nephrolithiasis. 3.  Anusha mesentery, nonspecific finding which can be seen with enteritis  etiologies (e.g., mesenteric panniculitis, enteritis, pancreatitis, etc.)     MRI as follows  FINDINGS:      Bone marrow: Left femoral neck displaced fracture. Small amount of complex fluid  signal within the fracture gap.  Surrounding marrow and enhancement, with  abnormal marrow signal within the distal fragment margin, may reflect acute  osteomyelitis. Right sacral and bilateral parasymphyseal pubic insufficiency  fractures. Nondisplaced fracture in the posterior aspect of the left greater  trochanter (4-15). Curvilinear sclerosis in the right femoral head (4-19), may  reflect an age indeterminate subchondral fracture. Moderate bilateral pubic  symphysis arthropathy.     Joint fluid: Physiologic.     Tendons: Grossly intact.     Muscles: Diffuse marked atrophy, fatty infiltration, and patchy edema signal.     Neurovascular bundles: Within normal limits.     Articular cartilage: Full-thickness chondral loss and bony productive change in  the bilateral hips. Moderate pubic symphysis arthropathy.     Soft tissues: Edema signal and enhancement surrounding the left femoral neck  fracture site.     IMPRESSION  1. Redemonstrated left femoral neck displaced fracture with fluid in the  fracture gap and edema/enhancement and surrounding marrow and soft tissues. Findings may reflect a healing fracture with possible superimposed  osteomyelitis/osteonecrosis. 2.  Right sacral and bilateral parasymphyseal pubic insufficiency fractures. 3.  RIGHT femoral head and LEFT greater trochanter nondisplaced fractures. Patient was seen by Ortho on consultation. No plans for surgery. Recommendation is for antibiotics  S/p Fluid aspiration by IR on 5/5  Cultures- pending . No organisms on GS  ( pt has been on Levaquin since 5/4 , may affect final culture results)  Patient seen today. States she feels better. Denies recent fall. She recalls falling over 2 years ago. She has been bedbound for the past 2 years.       Past Medical History:   Diagnosis Date    Acute on chronic respiratory failure with hypoxia and hypercapnia (HCC) 11/20/2020    Atrial fibrillation ([address] 11/4/2020    CHF (congestive heart failure) ([address]     Diabetes ([address]     Hypertension     Ill-defined condition     high cholesterol    Ill-defined condition     tachycardia    JOSLYN (obstructive sleep apnea) 11/20/2020    Other and unspecified symptoms and signs involving general sensations and perceptions     ruptured disc    S/P cardiac cath 11/4/2020    11/3/2020 nonobstructive disease     Past Surgical History:   Procedure Laterality Date    CT COMPRE ELECTROPHYSIOL XM W/LEFT ATRIAL PACNG/REC N/A 11/23/2020    Lt Atrial Pace & Record During Ep Study performed by Carmen Fritz MD at [facility] CARDIAC CATH LAB    CT COMPRE EP EVAL ABLTJ 3D MAPG TX SVT N/A 11/23/2020    ABLATION A-FLUTTER performed by Carmen Fritz MD at [facility] CARDIAC CATH LAB   [facility] ADD ON N/A 11/23/2020    Ablation Svt/Vt Add On performed by Carmen Fritz MD at [facility] CARDIAC CATH LAB    CT INTRACARDIAC ELECTROPHYSIOLOGIC 3D MAPPING N/A 11/23/2020    Ep 3d Mapping performed by Carmen Fritz MD at [facility] CARDIAC CATH LAB    CT STIM/PACING HEART POST IV DRUG INFU N/A 11/23/2020    Drug Stimulation performed by Carmen Fritz MD at [facility] CARDIAC CATH LAB     Allergies   Allergen Reactions    Cefazolin Rash    Other Plant, Animal, Environmental Rash     Ivory soap caused rash on hands.      Social History     Socioeconomic History    Marital status: SINGLE     Spouse name: Not on file    Number of children: Not on file    Years of education: Not on file    Highest education level: Not on file   Occupational History    Not on file   Tobacco Use    Smoking status: Former Smoker    Smokeless tobacco: Never Used   Substance and Sexual Activity    Alcohol use: No    Drug use: No    Sexual activity: Not on file   Other Topics Concern    Not on file   Social History Narrative    Not on file     Social Determinants of Health     Financial Resource Strain:     Difficulty of Paying Living Expenses: Not on file   Food Insecurity:     Worried About [address] in the Last Year: Not on file    Quita of Food in the Last Year: Not on file   Transportation Needs:  Lack of Transportation (Medical): Not on file    Lack of Transportation (Non-Medical): Not on file   Physical Activity:     Days of Exercise per Week: Not on file    Minutes of Exercise per Session: Not on file   Stress:     Feeling of Stress : Not on file   Social Connections:     Frequency of Communication with Friends and Family: Not on file    Frequency of Social Gatherings with Friends and Family: Not on file    Attends Catholic Services: Not on file    Active Member of [address] [place] [address] or Organizations: Not on file    Attends Club or Organization Meetings: Not on file    Marital Status: Not on file   Intimate Partner Violence:     Fear of Current or Ex-Partner: Not on file    Emotionally Abused: Not on file    Physically Abused: Not on file    Sexually Abused: Not on file   Housing Stability:     Unable to Pay for Housing in the Last Year: Not on file    Number of [place] in the Last Year: Not on file    Unstable Housing in the Last Year: Not on file     No family status information on file. Medication Documentation Review Audit    **Prior to Admission medications have not yet been reviewed for this encounter**         Review of Systems - Negative except those mentioned in H&P  PHYSICAL EXAM:  General:          Awake, cooperative, no acute distress    EENT:              EOMI. Anicteric sclerae. MMM  Resp:               CTA bilaterally, no wheezing or rales. No accessory muscle use  CV:                  Regular  rhythm,  No edema  GI:                   Soft, Non distended, Non tender. +Bowel sounds  Neurologic:      Alert and oriented X 3, normal speech,   Psych:             Good insight. Not anxious nor agitated  Skin:                No rashes. No jaundice.       Chantelle Nam MD [address]

## 2022-05-09 NOTE — PROGRESS NOTES
Hospitalist Progress Note    NAME: Ashley Pena   :  1960   MRN:  [id number]       Assessment / Plan:    Left hip fracture displaced POA age undetermined  ? left hip osteomyelitis POA  Bilateral buttock and sacral wounds POA  She reports  left hip  She  reports trauma years ago, denies any recently  She is very vague in terms of how long this has been going on  Found on CT scan done in the emergency room rule out osteomyelitis  Original reason sent to the ED was because of worsening buttock/sacral wounds  MRI was done which is more suggestive of fracture and osteomyelitis  [facility] evaluated no surgical intervention planned  As needed oxycodone  wound care on case  S/p  Aspiration/Bx by IR, F/u Bx results pending  Got  Vanco/zosyn in ED and then started on Levaquin. ID on case  Changed abx to vanc/cefepime      Diarrhea at home-C Difficile not send here, resolved     Possible UTI POA- IV abx per ID. NGTD on URx     Chronic hypoxic respiratory failure POA Baseline on 3 to 4 L NC O2 DHF  POA  COPD POA  stable on 3-4LNC, her baseline requirement.    Recent admission  to 2022 with shortness of breath and hypoxia  Treated with a course of steroids, doxycycline, diuresis  Discharged home at baseline  Continue diuresis  With lasix  Currently off prednisone  Continue nebs and oxygen      Paroxysmal A.  Fib  Not on  AC as nosebleeds with aspirin.    Multiple prior cardioversion procedures  Continue metoprolol/amio as per cards  On xarelto per cards  Currently in normal sinus rhythm     Steroid induced hyperglycemia  Glycemic last admit on steroids   A1c level 5.4 on        Hypertension controlled. Katerina Lr' PTA meds.      Morbid obesity/Impaired mobility/functional quadriplegia  Does not ambulate for over 2 years  With her daughter, having increasing difficulty caring for the patient        DVT prophylaxis with xarelto     Code status:full code  NOK: Daughter  Recommended Disposition:SNF  Anticipated Discharge Date:  >48 hours        Subjective: Follow-up visit for hip pain   Patient seen and examined, chart was reviewed. Patient feels better without any acute complaints at this time. Objective:     VITALS:   Last 24hrs VS reviewed since prior progress note. Most recent are:  Patient Vitals for the past 24 hrs:   Temp Pulse Resp BP SpO2   05/09/22 0700 -- 77 25 (!) 145/49 100 %   05/09/22 0506 98.2 °F (36.8 °C) 86 26 (!) 136/48 100 %   05/09/22 0102 98.4 °F (36.9 °C) 78 19 (!) 129/53 100 %   05/08/22 2004 98 °F (36.7 °C) 72 20 (!) 118/101 95 %   05/08/22 1500 98.5 °F (36.9 °C) 79 24 (!) 141/50 99 %   05/08/22 1100 98.6 °F (37 °C) 78 26 (!) 131/51 95 %       Intake/Output Summary (Last 24 hours) at 5/9/2022 0913  Last data filed at 5/8/2022 1252  Gross per 24 hour   Intake --   Output 600 ml   Net -600 ml        I had a face to face encounter, and independently examined this patient on 5/9/2022, as outlined below:    PHYSICAL EXAM:  General:           Chronic ill looking  HEENT:           Normocephalic                  Neck:                Short  Lungs:             No accessory muscle use  . Heart:              RRR 3+ LE edema  Abdomen:        Soft, non-tender. Obese    Skin:                bilateral feet scaly skin   neurologic:      Alert and oriented X 3, follows commands   Psych             normal affect                                Labs       Pertinent labs include:  Recent Labs     05/09/22 0441 05/07/22 0428   WBC 11.5* 12.2*   HGB 10.2* 10.7*   HCT 34.9* 35.8   * 148*     Recent Labs     05/09/22 0441 05/07/22  0428 05/06/22  0931    135* 134*   K 3.6 3.7 3.9   CL 99 98 94*   CO2 37* 32 37*   * 116* 118*   BUN 8 9 11   CREA 0.36* 0.35* 0.47*   CA 8.6 8.5 9.1   MG 2.2 2.2  --    PHOS 2.4* 2.7  --    ALB 1.9* 2.1*  --      MRI PELV W WO CONT    Result Date: 5/4/2022  1. Redemonstrated left femoral neck displaced fracture with fluid in the fracture gap and edema/enhancement and surrounding marrow and soft tissues. Findings may reflect a healing fracture with possible superimposed osteomyelitis/osteonecrosis. 2.  Right sacral and bilateral parasymphyseal pubic insufficiency fractures. 3.  RIGHT femoral head and LEFT greater trochanter nondisplaced fractures. CT BX BONE DP NDL/TROCAR    Result Date: 5/5/2022  Successful CT-guided lavage and aspiration of fluid from space between fracture fragments of the left hip. CT ABD PELV W CONT    Result Date: 5/3/2022  1. Displaced left femoral neck fracture deformity with aggressive erosive changes and sclerosis along the fracture margin (020-58). Consider acute osteomyelitis and/or septic arthritis given the clinical presentation. 2.  Bilateral nonobstructing nephrolithiasis. 3.  Anusha mesentery, nonspecific finding which can be seen with enteritis etiologies (e.g., mesenteric panniculitis, enteritis, pancreatitis, etc.)     XR CHEST PORT    Result Date: 5/3/2022  Improved congestion and pulmonary edema with possible left pleural effusion. No results found. 04/21/22    ECHO ADULT COMPLETE 04/23/2022 4/23/2022    Interpretation Summary    Left Ventricle: Normal left ventricular systolic function with a visually estimated EF of 60 - 65%. Left ventricle size is normal. Normal wall thickness. Normal wall motion. Normal diastolic function.   Technical qualifiers: Echo study was technically difficult with poor endocardial visualization and technically difficult due to patient's body habitus.     Signed by: Ladene Fothergill, MD on 4/23/2022  6:39 AM       Current Medications:     Current Facility-Administered Medications:     vancomycin (VANCOCIN) 1,000 mg in 0.9% sodium chloride 250 mL (VIAL-MATE), 1,000 mg, IntraVENous, Q12H, Sarah, Michael BUTCHER MD, Last Rate: 250 mL/hr at 05/09/22 0140, 1,000 mg at 05/09/22 0140   cyclobenzaprine (FLEXERIL) tablet 10 mg, 10 mg, Oral, TID PRN, Sarah, Michael BUTCHER MD, 10 mg at 05/09/22 0500    oxyCODONE IR (ROXICODONE) tablet 5-10 mg, 5-10 mg, Oral, Q4H PRN, Sarah, Michael BUTCHER MD, 10 mg at 05/09/22 0645    albuterol-ipratropium (DUO-NEB) 2.5 MG-0.5 MG/3 ML, 3 mL, Nebulization, Q4H PRN, Sarah, Michael BUTCHER MD    amiodarone (CORDARONE) tablet 400 mg, 400 mg, Oral, BID, Becky Hellier, NP, 400 mg at 05/09/22 0901    [START ON 5/11/2022] amiodarone (CORDARONE) tablet 400 mg, 400 mg, Oral, DAILY, Becky Hellier, NP    rivaroxaban (XARELTO) tablet 20 mg, 20 mg, Oral, DAILY, Becky Hellier, NP, 20 mg at 05/09/22 0902    [COMPLETED] cefepime (MAXIPIME) 2 g in 0.9% sodium chloride 10 mL IV syringe, 2 g, IntraVENous, NOW, 2 g at 05/06/22 1405 **FOLLOWED BY** cefepime (MAXIPIME) 2 g in 0.9% sodium chloride (MBP/ADV) 100 mL MBP, 2 g, IntraVENous, Q8H, Vani Mae MD, Last Rate: 25 mL/hr at 05/09/22 0500, 2 g at 05/09/22 0500    L.acidophilus-paracasei-S.thermophil-bifidobacter (RISAQUAD) 8 billion cell capsule, 1 Capsule, Oral, DAILY, Vani Mae MD, 1 Capsule at 05/09/22 0901    balsam peru-castor oiL (VENELEX) ointment, , Topical, BID, Bud Blake MD, Given at 05/09/22 0903    sodium chloride (NS) flush 5-40 mL, 5-40 mL, IntraVENous, Q8H, Luis Choe MD, 10 mL at 05/09/22 0501    sodium chloride (NS) flush 5-40 mL, 5-40 mL, IntraVENous, PRN, Luis Choe MD    acetaminophen (TYLENOL) tablet 650 mg, 650 mg, Oral, Q6H PRN, 650 mg at 05/07/22 0140 **OR** acetaminophen (TYLENOL) suppository 650 mg, 650 mg, Rectal, Q6H PRN, Luis Choe MD    polyethylene glycol (MIRALAX) packet 17 g, 17 g, Oral, DAILY, Luis Choe MD    bisacodyL (DULCOLAX) tablet 5 mg, 5 mg, Oral, DAILY PRN, Luis Choe MD    promethazine (PHENERGAN) tablet 12.5 mg, 12.5 mg, Oral, Q6H PRN **OR** ondansetron (ZOFRAN) injection 4 mg, 4 mg, IntraVENous, Q6H PRN, Lolis Pelletier MD NOHEMI    atorvastatin (LIPITOR) tablet 10 mg, 10 mg, Oral, QHS, Luis Choe MD, 10 mg at 05/08/22 2257    metoprolol tartrate (LOPRESSOR) tablet 25 mg, 25 mg, Oral, BID, Luis Choe MD, 25 mg at 05/09/22 0902    furosemide (LASIX) tablet 40 mg, 40 mg, Oral, DAILY, Luis Choe MD, 40 mg at 05/09/22 0902    [Held by provider] 0.9% sodium chloride infusion, 100 mL/hr, IntraVENous, CONTINUOUS, Randi Lawson MD, Last Rate: 100 mL/hr at 05/05/22 0325, 100 mL/hr at 05/05/22 0325    miconazole (SECURA) 2 % extra thick cream, , Topical, BID, Cristiano Zavala MD, Given at 05/09/22 4912    ammonium lactate (LAC-HYDRIN) 12 % lotion, , Topical, BID, Cristiano Zavala MD, Given at 05/09/22 8173       ________________________________________________________________________  Jeremías Clarke MD

## 2022-05-09 NOTE — PROGRESS NOTES
Transition of Care Plan:     RUR:  18% - moderate risk  Disposition:  [facility]  - will need rapid COVID prior to admission. Follow up appointments:  PCP; Specialist  DME needed:  SNF will provide.   Transportation at [address] [facility] to arrange.   [place] or means to access home:  vj      IM Medicare Letter:  To be given closer to dc  Is patient a BCPI-A Bundle:  No                  If yes, was Bundle Letter given?: N/A  Is patient a  and connected with the [address],[facility]  If yes, was [employer] transfer form completed and VA notified? Caregiver Contact:  Ivy DAVIS, LIQZEMOG, [phone]  Discharge Caregiver contacted prior to [address] needed? Not at this time. Updated Note 3:30 pm: CM spoke to admissions at [facility]. They saw the abx orders with labs. They can provide. Updated Note 11:48 pm: CM attempted to call [facility] to updated on IV abx and lab orders. VM left.      Initial Note 11:35 am: In review of chart, pt is not medically stable for discharge. CM contacted [facility] to update. Unit CM will continue to follow.      Tessa English RN, BSN, [address] Care Manager  [phone]

## 2022-05-10 LAB
CK SERPL-CCNC: 10 U/L (ref 26–192)
CRP SERPL-MCNC: 7.4 MG/DL (ref 0–0.6)
ERYTHROCYTE [SEDIMENTATION RATE] IN BLOOD: 58 MM/HR (ref 0–30)

## 2022-05-10 PROCEDURE — 77010033678 HC OXYGEN DAILY

## 2022-05-10 PROCEDURE — 74011250637 HC RX REV CODE- 250/637: Performed by: INTERNAL MEDICINE

## 2022-05-10 PROCEDURE — 36415 COLL VENOUS BLD VENIPUNCTURE: CPT

## 2022-05-10 PROCEDURE — 85652 RBC SED RATE AUTOMATED: CPT

## 2022-05-10 PROCEDURE — 65270000046 HC RM TELEMETRY

## 2022-05-10 PROCEDURE — 74011000258 HC RX REV CODE- 258: Performed by: INTERNAL MEDICINE

## 2022-05-10 PROCEDURE — 74011250636 HC RX REV CODE- 250/636: Performed by: HOSPITALIST

## 2022-05-10 PROCEDURE — 74011250637 HC RX REV CODE- 250/637: Performed by: HOSPITALIST

## 2022-05-10 PROCEDURE — 82550 ASSAY OF CK (CPK): CPT

## 2022-05-10 PROCEDURE — 74011250637 HC RX REV CODE- 250/637: Performed by: NURSE PRACTITIONER

## 2022-05-10 PROCEDURE — 74011250636 HC RX REV CODE- 250/636: Performed by: INTERNAL MEDICINE

## 2022-05-10 PROCEDURE — 74011000250 HC RX REV CODE- 250: Performed by: INTERNAL MEDICINE

## 2022-05-10 PROCEDURE — 86140 C-REACTIVE PROTEIN: CPT

## 2022-05-10 RX ADMIN — CASTOR OIL AND BALSAM, PERU: 788; 87 OINTMENT TOPICAL at 09:15

## 2022-05-10 RX ADMIN — CASTOR OIL AND BALSAM, PERU: 788; 87 OINTMENT TOPICAL at 21:38

## 2022-05-10 RX ADMIN — CEFEPIME HYDROCHLORIDE 2 G: 2 INJECTION, POWDER, FOR SOLUTION INTRAVENOUS at 05:42

## 2022-05-10 RX ADMIN — MICONAZOLE NITRATE: 20 CREAM TOPICAL at 21:38

## 2022-05-10 RX ADMIN — OXYCODONE 5 MG: 5 TABLET ORAL at 13:24

## 2022-05-10 RX ADMIN — Medication: at 19:05

## 2022-05-10 RX ADMIN — OXYCODONE 5 MG: 5 TABLET ORAL at 06:52

## 2022-05-10 RX ADMIN — FUROSEMIDE 40 MG: 40 TABLET ORAL at 09:14

## 2022-05-10 RX ADMIN — Medication 1 CAPSULE: at 09:14

## 2022-05-10 RX ADMIN — SODIUM CHLORIDE, PRESERVATIVE FREE 10 ML: 5 INJECTION INTRAVENOUS at 13:20

## 2022-05-10 RX ADMIN — AMIODARONE HYDROCHLORIDE 400 MG: 200 TABLET ORAL at 19:05

## 2022-05-10 RX ADMIN — OXYCODONE 10 MG: 5 TABLET ORAL at 19:51

## 2022-05-10 RX ADMIN — VANCOMYCIN HYDROCHLORIDE 1000 MG: 1 INJECTION, POWDER, LYOPHILIZED, FOR SOLUTION INTRAVENOUS at 01:28

## 2022-05-10 RX ADMIN — MICONAZOLE NITRATE: 20 CREAM TOPICAL at 09:16

## 2022-05-10 RX ADMIN — CYCLOBENZAPRINE 10 MG: 10 TABLET, FILM COATED ORAL at 09:14

## 2022-05-10 RX ADMIN — METOPROLOL TARTRATE 25 MG: 25 TABLET, FILM COATED ORAL at 09:14

## 2022-05-10 RX ADMIN — CEFEPIME HYDROCHLORIDE 2 G: 2 INJECTION, POWDER, FOR SOLUTION INTRAVENOUS at 21:37

## 2022-05-10 RX ADMIN — POLYETHYLENE GLYCOL 3350 17 G: 17 POWDER, FOR SOLUTION ORAL at 09:15

## 2022-05-10 RX ADMIN — OXYCODONE 10 MG: 5 TABLET ORAL at 01:28

## 2022-05-10 RX ADMIN — ATORVASTATIN CALCIUM 10 MG: 10 TABLET, FILM COATED ORAL at 21:38

## 2022-05-10 RX ADMIN — SODIUM CHLORIDE, PRESERVATIVE FREE 10 ML: 5 INJECTION INTRAVENOUS at 05:46

## 2022-05-10 RX ADMIN — RIVAROXABAN 20 MG: 20 TABLET, FILM COATED ORAL at 09:14

## 2022-05-10 RX ADMIN — VANCOMYCIN HYDROCHLORIDE 1000 MG: 1 INJECTION, POWDER, LYOPHILIZED, FOR SOLUTION INTRAVENOUS at 13:19

## 2022-05-10 RX ADMIN — METOPROLOL TARTRATE 25 MG: 25 TABLET, FILM COATED ORAL at 19:05

## 2022-05-10 RX ADMIN — Medication: at 09:15

## 2022-05-10 RX ADMIN — AMIODARONE HYDROCHLORIDE 400 MG: 200 TABLET ORAL at 09:14

## 2022-05-10 RX ADMIN — SODIUM CHLORIDE, PRESERVATIVE FREE 10 ML: 5 INJECTION INTRAVENOUS at 21:38

## 2022-05-10 NOTE — PROGRESS NOTES
End of Shift Note    Bedside shift change report given to Gonzalez Marcos (oncoming nurse) by Afia Rosen RN (offgoing nurse). Report included the following information SBAR, Kardex, ED Summary, Procedure Summary, Intake/Output, MAR, Recent Results and Cardiac Rhythm . Shift worked:  Night     Shift summary and any significant changes:    Pt refusing to be turned as states its too painful, and does not like the bed to rotate, states the pain meds only help a short while     Concerns for physician to address:  refusing to turn     Zone phone for oncoming shift:        Activity:  Activity Level: Bed Rest  Number times ambulated in hallways past shift: 0  Number of times OOB to chair past shift: 0    Cardiac:   Cardiac Monitoring: Yes      Cardiac Rhythm: Sinus Rhythm    Access:   Current line(s): PIV     Genitourinary:   Urinary status: voiding and external catheter    Respiratory:   O2 Device: Nasal cannula  Chronic home O2 use?: YES  Incentive spirometer at bedside: NO       GI:  Last Bowel Movement Date:  (PTA)  Current diet:  ADULT DIET Easy to Chew  Passing flatus: YES  Tolerating current diet: YES       Pain Management:   Patient states pain is manageable on current regimen: YES    Skin:  Gaston Score: 13  Interventions: turn team, speciality bed, float heels, foam dressing, PT/OT consult and internal/external urinary devices    Patient Safety:  Fall Score:  Total Score: 2  Interventions: bed/chair alarm, assistive device (walker, cane, etc), gripper socks and pt to call before getting OOB       Length of Stay:  Expected LOS: - - -  Actual LOS: Davey Mari RN

## 2022-05-11 PROCEDURE — 74011000250 HC RX REV CODE- 250: Performed by: INTERNAL MEDICINE

## 2022-05-11 PROCEDURE — 77010033678 HC OXYGEN DAILY

## 2022-05-11 PROCEDURE — 74011250637 HC RX REV CODE- 250/637: Performed by: INTERNAL MEDICINE

## 2022-05-11 PROCEDURE — 74011250636 HC RX REV CODE- 250/636: Performed by: INTERNAL MEDICINE

## 2022-05-11 PROCEDURE — 74011000258 HC RX REV CODE- 258: Performed by: INTERNAL MEDICINE

## 2022-05-11 PROCEDURE — 74011250636 HC RX REV CODE- 250/636: Performed by: HOSPITALIST

## 2022-05-11 PROCEDURE — 74011250637 HC RX REV CODE- 250/637: Performed by: HOSPITALIST

## 2022-05-11 PROCEDURE — 74011250637 HC RX REV CODE- 250/637: Performed by: NURSE PRACTITIONER

## 2022-05-11 PROCEDURE — 65270000046 HC RM TELEMETRY

## 2022-05-11 RX ADMIN — VANCOMYCIN HYDROCHLORIDE 1000 MG: 1 INJECTION, POWDER, LYOPHILIZED, FOR SOLUTION INTRAVENOUS at 02:43

## 2022-05-11 RX ADMIN — METOPROLOL TARTRATE 25 MG: 25 TABLET, FILM COATED ORAL at 17:09

## 2022-05-11 RX ADMIN — ATORVASTATIN CALCIUM 10 MG: 10 TABLET, FILM COATED ORAL at 21:33

## 2022-05-11 RX ADMIN — OXYCODONE 10 MG: 5 TABLET ORAL at 08:49

## 2022-05-11 RX ADMIN — SODIUM CHLORIDE, PRESERVATIVE FREE 10 ML: 5 INJECTION INTRAVENOUS at 14:34

## 2022-05-11 RX ADMIN — SODIUM CHLORIDE, PRESERVATIVE FREE 10 ML: 5 INJECTION INTRAVENOUS at 05:02

## 2022-05-11 RX ADMIN — SODIUM CHLORIDE, PRESERVATIVE FREE 10 ML: 5 INJECTION INTRAVENOUS at 21:36

## 2022-05-11 RX ADMIN — CEFEPIME HYDROCHLORIDE 2 G: 2 INJECTION, POWDER, FOR SOLUTION INTRAVENOUS at 05:02

## 2022-05-11 RX ADMIN — CEFEPIME HYDROCHLORIDE 2 G: 2 INJECTION, POWDER, FOR SOLUTION INTRAVENOUS at 21:35

## 2022-05-11 RX ADMIN — RIVAROXABAN 20 MG: 20 TABLET, FILM COATED ORAL at 08:49

## 2022-05-11 RX ADMIN — FUROSEMIDE 40 MG: 40 TABLET ORAL at 08:50

## 2022-05-11 RX ADMIN — METOPROLOL TARTRATE 25 MG: 25 TABLET, FILM COATED ORAL at 08:49

## 2022-05-11 RX ADMIN — AMIODARONE HYDROCHLORIDE 400 MG: 200 TABLET ORAL at 08:49

## 2022-05-11 RX ADMIN — CASTOR OIL AND BALSAM, PERU: 788; 87 OINTMENT TOPICAL at 08:56

## 2022-05-11 RX ADMIN — OXYCODONE 10 MG: 5 TABLET ORAL at 21:34

## 2022-05-11 RX ADMIN — Medication: at 08:55

## 2022-05-11 RX ADMIN — CASTOR OIL AND BALSAM, PERU: 788; 87 OINTMENT TOPICAL at 21:34

## 2022-05-11 RX ADMIN — Medication: at 17:11

## 2022-05-11 RX ADMIN — MICONAZOLE NITRATE: 20 CREAM TOPICAL at 08:56

## 2022-05-11 RX ADMIN — VANCOMYCIN HYDROCHLORIDE 1000 MG: 1 INJECTION, POWDER, LYOPHILIZED, FOR SOLUTION INTRAVENOUS at 15:58

## 2022-05-11 RX ADMIN — Medication 1 CAPSULE: at 08:49

## 2022-05-11 RX ADMIN — MICONAZOLE NITRATE: 20 CREAM TOPICAL at 21:35

## 2022-05-11 RX ADMIN — POLYETHYLENE GLYCOL 3350 17 G: 17 POWDER, FOR SOLUTION ORAL at 08:49

## 2022-05-11 RX ADMIN — CEFEPIME HYDROCHLORIDE 2 G: 2 INJECTION, POWDER, FOR SOLUTION INTRAVENOUS at 14:31

## 2022-05-11 NOTE — PROGRESS NOTES
Spiritual Care Assessment/Progress Note  [facility]      NAME: Letha Pineda      MRN: [id number]  AGE: 57 y.o.  SEX: female  Taoism Affiliation: [place]   Language: English     5/11/2022     Total Time (in minutes): 10     Spiritual Assessment begun in [facility] PRO[facility]ESS[facility] through conversation with:         [x]Patient        [] Family    [] Friend(s)        Reason for Consult: Palliative Care, Initial/Spiritual Assessment     Spiritual beliefs: (Please include comment if needed)     [x] Identifies with a rosalina tradition:  Rastafarian        [] Supported by a rosalina community:            [] Claims no spiritual orientation:           [] Seeking spiritual identity:                [] Adheres to an individual form of spirituality:           [] Not able to assess:                           Identified resources for coping:      [x] Prayer                               [] Music                  [] Guided Imagery     [] Family/friends                 [] Pet visits     [] Devotional reading                         [] Unknown     [] Other:                                        Interventions offered during this visit: (See comments for more details)    Patient Interventions: Affirmation of emotions/emotional suffering,Affirmation of rosalina,Iconic (affirming the presence of God/Higher Power),Initial/Spiritual assessment, patient floor,Prayer (assurance of),Prayer (actual)           Plan of Care:     [] Support spiritual and/or cultural needs    [] Support AMD and/or advance care planning process      [] Support grieving process   [] Coordinate Rites and/or Rituals    [] Coordination with community clergy   [] No spiritual needs identified at this time   [] Detailed Plan of Care below (See Comments)  [] Make referral to Music Therapy  [] Make referral to Pet Therapy     [] Make referral to Addiction services  [] Make referral to [facility]  [] Make referral to Spiritual Care Partner  [] No future visits requested        [x] Contact Spiritual Care for further referrals     Comments: Initial spiritual assessment in Progressive Care. Reviewed chart prior to visit. Miss Mykel Ireland shared she was a bit discourage. She is feeling better than when she came in though she has not progressed like she hoped. She has a daughter and granddaughter who she talks to on the phone often. They are unable to drive to come for a visit. She shared she has God in her heart and requested prayer. Provided supportive listening presence, breathing meditation,  In the the [place]. .. Out with the Rest...  and prayer. Contact Spiritual Care for any further referrals.   David Livingston., MS., [address] [place] ([phone])

## 2022-05-11 NOTE — PROGRESS NOTES
Transition of Care Plan:     RUR:  18% - moderate risk    TOM: 5/13? Waiting on Final rec and Lab/Biopsy results. Will need PICC line. Disposition:  [facility]   [facility] is aware of current IV ABX order. Karlos Kim if plan changes please let CM know so we can reverify IV plan.    - will need rapid COVID prior to admission. Follow up appointments:  PCP; Specialist  DME needed:  SNF will provide.   Transportation at [id number] [place] [address] to arrange.   [place] or means to access home:  vj       Medicare Letter:  To be given closer to dc  Is patient a BCPI-A Bundle:  No                  If yes, was Bundle Letter given?: N/A  Is patient a [place] and connected with the [address],[facility]  If yes, was [place] transfer form completed and VA notified? Caregiver Contact:  SMITH [place], [phone]  Discharge Caregiver contacted prior to [address] needed? Not at this time. CM will continue to monitor discharge plan.      Marco Harry [place]  Ext [phone]

## 2022-05-11 NOTE — PROGRESS NOTES
1900: Bedside shift change report given to Mychal Morillo (oncoming nurse) by Cande Bhandari RN (offgoing nurse). Report included the following information SBAR, Kardex, ED Summary, Intake/Output, MAR, Recent Results and Cardiac Rhythm NSR. End of Shift Note    Bedside shift change report given to [address] [place] [address] (oncoming nurse) by Carol Hebert RN (offgoing nurse). Report included the following information SBAR, Kardex, ED Summary, Intake/Output, MAR, Recent Results and Cardiac Rhythm NSR    Shift worked:  [id number]     Shift summary and any significant changes:    Pain control with pain meds. SEE MAR. Concerns for physician to address:  none at this time      [facility]          Activity:  Activity Level: Bed Rest  Number times ambulated in hallways past shift: 0  Number of times OOB to chair past shift: 0    Cardiac:   Cardiac Monitoring: Yes      Cardiac Rhythm: Sinus Rhythm    Access:   Current line(s): PIV     Genitourinary:   Urinary status: voiding and external catheter    Respiratory:   O2 Device: Nasal cannula  Chronic home O2 use?: YES  Incentive spirometer at bedside: NO       GI:  Last Bowel Movement Date:  (PTA)  Current diet:  ADULT DIET Easy to Chew  Passing flatus: YES  Tolerating current diet: YES       Pain Management:   Patient states pain is manageable on current regimen: YES    Skin:  Gaston Score: 13  Interventions: turn team, speciality bed, float heels, increase time out of bed, foam dressing, PT/OT consult and internal/external urinary devices    Patient Safety:  Fall Score:  Total Score: 2  Interventions: bed/chair alarm, assistive device (walker, cane, etc), gripper socks, pt to call before getting OOB and stay with me (per policy)       Length of Stay:  Expected LOS: - - -  Actual LOS: [place], RN

## 2022-05-11 NOTE — PROGRESS NOTES
Comprehensive Nutrition Assessment    Type and Reason for Visit: Initial,RD nutrition re-screen/LOS    Nutrition Recommendations/Plan:   1. Continue easy to chew diet  2. Add ensure high protein BID     Nutrition Assessment:   Patient medically noted for left hip fracture and osteomyelitis. PMH Chronic respiratory failure, COPD, and HTN. Chart reviewed for length of stay. Patient reports a fair appetite but doesn't like a lot of the food items she receives. Menu provided to help with meal selections. Wound care note mentions unstageable PI but this isn't documented in flowsheets correctly so no auto-referral sent to nutrition. Patient agreeable to trial of ensure high protein. Encouraged intake of meals. Nutrition Related Findings:  Labs reviewed  Atorvastatin, Lopressor, Miralax, Risaquad      Wound Type: Unstageable    Current Nutrition Intake & Therapies:  Average Meal Intake: 51-75%  Average Supplement Intake: None ordered  ADULT DIET Easy to Chew    Anthropometric Measures:  Height: 5' 3\" (160 cm)  Ideal Body Weight (IBW): 115 lbs (52 kg)     Current Body Wt:  130.4 kg (287 lb 7.7 oz), 250 % IBW. Current BMI (kg/m2): 50.9                          BMI Category: Obese class 3 (BMI 40.0 or greater)    Estimated Daily Nutrient Needs:  Energy Requirements Based On: Formula  Weight Used for Energy Requirements: Current  Energy (kcal/day): 1995 kcal (BMR 1829 x 1. 2AF -200kcal)  Weight Used for Protein Requirements: Current  Protein (g/day): 104-130g (0.8-1.0 g/kg bw)  Method Used for Fluid Requirements: 1 ml/kcal  Fluid (ml/day): 1800 mL or per MD    Nutrition Diagnosis:   · Increased nutrient needs related to  (wound healing) as evidenced by  (PI)    Nutrition Interventions:   Food and/or Nutrient Delivery: Continue current diet,Start oral nutrition supplement  Nutrition Education/Counseling: No recommendations at this time  Coordination of Nutrition Care: Continue to monitor while inpatient Goals:     Goals:  (PO intake >70% of meals/ONS next 3-5 days)       Nutrition Monitoring and Evaluation:   Behavioral-Environmental Outcomes: None identified  Food/Nutrient Intake Outcomes: Food and nutrient intake,Supplement intake  Physical Signs/Symptoms Outcomes: Biochemical data,Skin,Weight    Discharge Planning:    Continue current diet    Sharri Munoz RD  Contact: ext [phone]

## 2022-05-11 NOTE — PROGRESS NOTES
0700- report received from Shola Whitehead 468- reviewed and agree with assessment completed by Duong Penny RN    9673- report given to Vero Guillaume RN

## 2022-05-11 NOTE — PROGRESS NOTES
Hospitalist Progress Note    NAME: Juana Ayala   :  1960   MRN:  [id number]       Assessment / Plan:    Left hip fracture displaced POA age undetermined  ? left hip osteomyelitis POA  Bilateral buttock and sacral wounds POA  She  reports trauma years ago, denies any recently  She is very vague in terms of how long this has been going on  Found on CT scan done in the emergency room rule out osteomyelitis  Original reason sent to the ED was because of worsening buttock/sacral wounds  MRI was done which is more suggestive of fracture and osteomyelitis  [facility] evaluated no surgical intervention planned  As needed oxycodone  wound care on case  S/p  Aspiration/Bx by IR, F/u Bx results NGTD  Got  Vanco/zosyn in ED and then started on Levaquin. ID on case  Changed abx to vanc/cefepime  Per ID may need 6 weeks IV abx with Dapto/cefepime at D/C    Diarrhea at home-C Difficile not send here, resolved     Possible UTI POA-   NGTD on URx, already got Abx     Chronic hypoxic respiratory failure/DHF   Baseline on 3 to 4 L NC O2    COPD   stable on 3-4LNC, her baseline requirement.    Recent admission  to 2022 with shortness of breath and hypoxia  Treated with a course of steroids, doxycycline, diuresis  Discharged home at baseline  Continue diuresis  With lasix  Currently off prednisone  Continue nebs and oxygen      Paroxysmal A.  Fib  Not on  AC as nosebleeds with aspirin.    Multiple prior cardioversion procedures  Continue metoprolol/amio as per cards  On xarelto per cards  Currently in normal sinus rhythm     Steroid induced hyperglycemia  Glycemic last admit on steroids   A1c level 5.4 on        HTN controlled.  Cont' PTA meds.      Morbid obesity/Impaired mobility/functional quadriplegia  Does not ambulate for over 2 years  With her daughter, having increasing difficulty caring for the patient        DVT prophylaxis with xarelto     Code status:full code  NOK: Daughter  Recommended Disposition:SNF  Anticipated Discharge Date:  Pending Final L hip aspiration Cx report on IV abx as per ID        Subjective: Follow-up visit for hip pain   Patient seen and examined, chart was reviewed. Patient wants IV morphine for pain control while in the hospital.  Denies any nausea vomiting diarrhea. Objective:     VITALS:   Last 24hrs VS reviewed since prior progress note. Most recent are:  Patient Vitals for the past 24 hrs:   Temp Pulse Resp BP SpO2   05/11/22 0720 -- 72 23 133/60 98 %   05/11/22 0400 98.2 °F (36.8 °C) 69 24 (!) 117/56 98 %   05/11/22 0000 98.3 °F (36.8 °C) 71 24 (!) 119/59 98 %   05/10/22 1950 98.1 °F (36.7 °C) 80 20 (!) 141/68 99 %   05/10/22 1532 98.2 °F (36.8 °C) 76 24 (!) 131/58 96 %   05/10/22 1102 98.2 °F (36.8 °C) 78 18 105/80 98 %       Intake/Output Summary (Last 24 hours) at 5/11/2022 0943  Last data filed at 5/11/2022 0400  Gross per 24 hour   Intake 2700 ml   Output 1500 ml   Net 1200 ml        I had a face to face encounter, and independently examined this patient on 5/11/2022, as outlined below:    PHYSICAL EXAM:  General:           Chronic ill looking  HEENT:           Normocephalic                  Neck:                Short  Lungs:             No accessory muscle use  . Heart:              RRR,  LE edema  Abdomen:        Soft, non-tender. Obese    Skin:                bilateral feet scaly skin   neurologic:      Alert and oriented X 3, follows commands   Psych             normal affect                                Labs       Pertinent labs include:  Recent Labs     05/09/22  0441   WBC 11.5*   HGB 10.2*   HCT 34.9*   *     Recent Labs     05/09/22  0441      K 3.6   CL 99   CO2 37*   *   BUN 8   CREA 0.36*   CA 8.6   MG 2.2   PHOS 2.4*   ALB 1.9*     MRI PELV W WO CONT    Result Date: 5/4/2022  1.   Redemonstrated left femoral neck displaced fracture with fluid in the fracture gap and edema/enhancement and surrounding marrow and soft tissues. Findings may reflect a healing fracture with possible superimposed osteomyelitis/osteonecrosis. 2.  Right sacral and bilateral parasymphyseal pubic insufficiency fractures. 3.  RIGHT femoral head and LEFT greater trochanter nondisplaced fractures. CT BX BONE DP NDL/TROCAR    Result Date: 5/5/2022  Successful CT-guided lavage and aspiration of fluid from space between fracture fragments of the left hip. CT ABD PELV W CONT    Result Date: 5/3/2022  1. Displaced left femoral neck fracture deformity with aggressive erosive changes and sclerosis along the fracture margin (175-51). Consider acute osteomyelitis and/or septic arthritis given the clinical presentation. 2.  Bilateral nonobstructing nephrolithiasis. 3.  Anusha mesentery, nonspecific finding which can be seen with enteritis etiologies (e.g., mesenteric panniculitis, enteritis, pancreatitis, etc.)     XR CHEST PORT    Result Date: 5/3/2022  Improved congestion and pulmonary edema with possible left pleural effusion. No results found. 04/21/22    ECHO ADULT COMPLETE 04/23/2022 4/23/2022    Interpretation Summary    Left Ventricle: Normal left ventricular systolic function with a visually estimated EF of 60 - 65%. Left ventricle size is normal. Normal wall thickness. Normal wall motion. Normal diastolic function.   Technical qualifiers: Echo study was technically difficult with poor endocardial visualization and technically difficult due to patient's body habitus.     Signed by: Tavia Quezada MD on 4/23/2022  6:39 AM       Current Medications:     Current Facility-Administered Medications:     vancomycin (VANCOCIN) 1,000 mg in 0.9% sodium chloride 250 mL (VIAL-MATE), 1,000 mg, IntraVENous, Q12H, Sarah, Michael BUTCHER MD, Last Rate: 250 mL/hr at 05/11/22 0243, 1,000 mg at 05/11/22 0243    cyclobenzaprine (FLEXERIL) tablet 10 mg, 10 mg, Oral, TID PRN, Michael Smith MD, 10 mg at 05/10/22 3757    oxyCODONE IR (ROXICODONE) tablet 5-10 mg, 5-10 mg, Oral, Q4H PRN, Michael Smith MD, 10 mg at 05/11/22 0849    albuterol-ipratropium (DUO-NEB) 2.5 MG-0.5 MG/3 ML, 3 mL, Nebulization, Q4H PRN, Michael Smith MD    amiodarone (CORDARONE) tablet 400 mg, 400 mg, Oral, DAILY, Felicitas Sarah NP, 400 mg at 05/11/22 0849    rivaroxaban (XARELTO) tablet 20 mg, 20 mg, Oral, DAILY, Felicitas Sarah NP, 20 mg at 05/11/22 0849    [COMPLETED] cefepime (MAXIPIME) 2 g in 0.9% sodium chloride 10 mL IV syringe, 2 g, IntraVENous, NOW, 2 g at 05/06/22 1405 **FOLLOWED BY** cefepime (MAXIPIME) 2 g in 0.9% sodium chloride (MBP/ADV) 100 mL MBP, 2 g, IntraVENous, Q8H, Ac Mae MD, Last Rate: 25 mL/hr at 05/11/22 0502, 2 g at 05/11/22 0502    L.acidophilus-paracasei-S.thermophil-bifidobacter (RISAQUAD) 8 billion cell capsule, 1 Capsule, Oral, DAILY, Ac Mae MD, 1 Capsule at 05/11/22 0849    balsam peru-castor oiL (VENELEX) ointment, , Topical, BID, Palma Blake MD, Given at 05/11/22 0856    sodium chloride (NS) flush 5-40 mL, 5-40 mL, IntraVENous, Q8H, Emma Choe MD, 10 mL at 05/11/22 0502    sodium chloride (NS) flush 5-40 mL, 5-40 mL, IntraVENous, PRN, Emma Choe MD    acetaminophen (TYLENOL) tablet 650 mg, 650 mg, Oral, Q6H PRN, 650 mg at 05/09/22 2252 **OR** acetaminophen (TYLENOL) suppository 650 mg, 650 mg, Rectal, Q6H PRN, Emma Choe MD    polyethylene glycol (MIRALAX) packet 17 g, 17 g, Oral, DAILY, Emma Choe MD, 17 g at 05/11/22 0849    bisacodyL (DULCOLAX) tablet 5 mg, 5 mg, Oral, DAILY PRN, Emma Choe MD    promethazine (PHENERGAN) tablet 12.5 mg, 12.5 mg, Oral, Q6H PRN **OR** ondansetron (ZOFRAN) injection 4 mg, 4 mg, IntraVENous, Q6H PRN, Emma Choe MD    atorvastatin (LIPITOR) tablet 10 mg, 10 mg, Oral, QHS, Emma Choe MD, 10 mg at 05/10/22 7426    metoprolol tartrate (LOPRESSOR) tablet 25 mg, 25 mg, Oral, BID, Cristy Choe MD, 25 mg at 05/11/22 0849    furosemide (LASIX) tablet 40 mg, 40 mg, Oral, DAILY, Cristy Choe MD, 40 mg at 05/11/22 0850    miconazole (SECURA) 2 % extra thick cream, , Topical, BID, Tara Falcon MD, Given at 05/11/22 0856    ammonium lactate (LAC-HYDRIN) 12 % lotion, , Topical, BID, Tara Falcon MD, Given at 05/11/22 9910       ________________________________________________________________________  Stephanie Marie MD

## 2022-05-12 LAB
DATE LAST DOSE: ABNORMAL
REPORTED DOSE,DOSE: ABNORMAL UNITS
REPORTED DOSE/TIME,TMG: ABNORMAL
VANCOMYCIN TROUGH SERPL-MCNC: 18.7 UG/ML (ref 5–10)

## 2022-05-12 PROCEDURE — 80202 ASSAY OF VANCOMYCIN: CPT

## 2022-05-12 PROCEDURE — C1751 CATH, INF, PER/CENT/MIDLINE: HCPCS

## 2022-05-12 PROCEDURE — 77030018786 HC NDL GD F/USND BARD -B

## 2022-05-12 PROCEDURE — 74011000250 HC RX REV CODE- 250: Performed by: INTERNAL MEDICINE

## 2022-05-12 PROCEDURE — 74011250637 HC RX REV CODE- 250/637: Performed by: NURSE PRACTITIONER

## 2022-05-12 PROCEDURE — 74011000258 HC RX REV CODE- 258: Performed by: INTERNAL MEDICINE

## 2022-05-12 PROCEDURE — 74011250636 HC RX REV CODE- 250/636: Performed by: HOSPITALIST

## 2022-05-12 PROCEDURE — 36415 COLL VENOUS BLD VENIPUNCTURE: CPT

## 2022-05-12 PROCEDURE — 74011250637 HC RX REV CODE- 250/637: Performed by: INTERNAL MEDICINE

## 2022-05-12 PROCEDURE — 76937 US GUIDE VASCULAR ACCESS: CPT

## 2022-05-12 PROCEDURE — 65270000046 HC RM TELEMETRY

## 2022-05-12 PROCEDURE — 74011250637 HC RX REV CODE- 250/637: Performed by: HOSPITALIST

## 2022-05-12 PROCEDURE — 36573 INSJ PICC RS&I 5 YR+: CPT | Performed by: HOSPITALIST

## 2022-05-12 PROCEDURE — 74011250636 HC RX REV CODE- 250/636: Performed by: INTERNAL MEDICINE

## 2022-05-12 RX ORDER — MORPHINE SULFATE 2 MG/ML
2 INJECTION, SOLUTION INTRAMUSCULAR; INTRAVENOUS
Status: ACTIVE | OUTPATIENT
Start: 2022-05-12 | End: 2022-05-14

## 2022-05-12 RX ORDER — HEPARIN 100 UNIT/ML
300 SYRINGE INTRAVENOUS AS NEEDED
Status: CANCELLED | OUTPATIENT
Start: 2022-05-12

## 2022-05-12 RX ADMIN — Medication: at 17:43

## 2022-05-12 RX ADMIN — METOPROLOL TARTRATE 25 MG: 25 TABLET, FILM COATED ORAL at 17:43

## 2022-05-12 RX ADMIN — SODIUM CHLORIDE, PRESERVATIVE FREE 10 ML: 5 INJECTION INTRAVENOUS at 20:05

## 2022-05-12 RX ADMIN — Medication: at 08:43

## 2022-05-12 RX ADMIN — CEFEPIME HYDROCHLORIDE 2 G: 2 INJECTION, POWDER, FOR SOLUTION INTRAVENOUS at 05:07

## 2022-05-12 RX ADMIN — CASTOR OIL AND BALSAM, PERU: 788; 87 OINTMENT TOPICAL at 08:44

## 2022-05-12 RX ADMIN — ATORVASTATIN CALCIUM 10 MG: 10 TABLET, FILM COATED ORAL at 20:10

## 2022-05-12 RX ADMIN — VANCOMYCIN HYDROCHLORIDE 1000 MG: 1 INJECTION, POWDER, LYOPHILIZED, FOR SOLUTION INTRAVENOUS at 14:39

## 2022-05-12 RX ADMIN — VANCOMYCIN HYDROCHLORIDE 1000 MG: 1 INJECTION, POWDER, LYOPHILIZED, FOR SOLUTION INTRAVENOUS at 01:35

## 2022-05-12 RX ADMIN — AMIODARONE HYDROCHLORIDE 400 MG: 200 TABLET ORAL at 08:43

## 2022-05-12 RX ADMIN — Medication 1 CAPSULE: at 08:43

## 2022-05-12 RX ADMIN — METOPROLOL TARTRATE 25 MG: 25 TABLET, FILM COATED ORAL at 08:42

## 2022-05-12 RX ADMIN — RIVAROXABAN 20 MG: 20 TABLET, FILM COATED ORAL at 08:43

## 2022-05-12 RX ADMIN — OXYCODONE 10 MG: 5 TABLET ORAL at 18:59

## 2022-05-12 RX ADMIN — OXYCODONE 10 MG: 5 TABLET ORAL at 11:50

## 2022-05-12 RX ADMIN — OXYCODONE 10 MG: 5 TABLET ORAL at 23:04

## 2022-05-12 RX ADMIN — FUROSEMIDE 40 MG: 40 TABLET ORAL at 08:43

## 2022-05-12 RX ADMIN — SODIUM CHLORIDE, PRESERVATIVE FREE 10 ML: 5 INJECTION INTRAVENOUS at 05:08

## 2022-05-12 RX ADMIN — SODIUM CHLORIDE, PRESERVATIVE FREE 10 ML: 5 INJECTION INTRAVENOUS at 13:42

## 2022-05-12 RX ADMIN — CEFEPIME HYDROCHLORIDE 2 G: 2 INJECTION, POWDER, FOR SOLUTION INTRAVENOUS at 13:42

## 2022-05-12 RX ADMIN — MICONAZOLE NITRATE: 20 CREAM TOPICAL at 08:44

## 2022-05-12 RX ADMIN — CEFEPIME HYDROCHLORIDE 2 G: 2 INJECTION, POWDER, FOR SOLUTION INTRAVENOUS at 20:08

## 2022-05-12 NOTE — PROGRESS NOTES
Transition of Care Plan:     RUR:  18% - moderate risk     TOM: 5/13?      Disposition:  [facility]    - will need rapid COVID prior to DC    2:48 PM   CM talked to RN and they want Pt to have Daptomycin at SNF. Pt is currently on Vancomycin and they want to switch if it is not to expensive. If they will not accept Daptomycin than they will continue current treatment. -CM talked to [facility] Admissions today and they are having a uptick in [place] cases. Dmitriy Punt if they have further increase in cases they may need to hold admissions until under control. CM let Pt know this information. . we tried to call DTR in room but she did not answer. I provided choice list for SNF in case we need to switch SNF. Abdias Geovany at [facility] is going to check with other MFA to see if they could offer her a bed. - Pt reported she is not vaccinated against COVID. Pt would like to get COVID Vaccine.      Follow up appointments:  PCP; Specialist  DME needed:  SNF will provide.   Transportation at [address] [place] [address] to arrange.   [employer] or means to access home:  vj       Medicare Letter:  To be given closer to dc  Is patient a BCPI-A Bundle:  No                  If yes, was Bundle Letter given?: N/A  Is patient a [place] and connected with the [address] [place] [address] Drive,[address]  If yes, was [employer] transfer form completed and [facility] notified? Caregiver Contact:  Ivy RUSSELL, QIXXBQAL, [phone]  Discharge Caregiver contacted prior to discharge?   Yes  -2:49 PM CM tried to call Pt DTR in room with Pt.   No answer  Care Conference needed? Not at this time.     CM will continue to monitor discharge plan.      Roman Justin, [address]

## 2022-05-12 NOTE — PROGRESS NOTES
0700- report received from [place] [address] [address]- End of Shift Note    Bedside shift change report given to MARIE Llamas (oncoming nurse) by Sherwin Cruz RN (offgoing nurse). Report included the following information SBAR, Kardex and MAR    Shift worked:  7a-7p     Shift summary and any significant changes:     PICC placed. Pending placement.  Wound care     Concerns for physician to address:       Zone phone for oncoming shift:              Sherwin Cruz, RN

## 2022-05-12 NOTE — PROGRESS NOTES
PICC Education: Explained reason and rationale for PICC placement along with providing education in order to make an informed consent including nature, risks, benefits, potential complications, care and maintenance of PICC line. The opportunity for questions or concerns was given. Patient  gave written consent for PICC procedure to be done at the bedside. Patient  verbalizes understanding at this time. Procedure:  Time out completed. Pre procedure assessment done. Maximum sterile barrier precautions observed throughout procedure. Lidocaine 1% 3.0 ml sc given prior to cannulation  Cannulated Basilic vein using ultrasound guidance and modified seldinger technique. Inserted SINGLE lumen 4 fr PICC in  LEFT arm using, [email or web address] Tip Location System and 3CG   Patient has sinus rhythm. Tip Positioning System indicating tall P wave and no negative deflection before P wave which would indicate the PICC tip is properly placed in the distal SVC or the CAJ. PICC tip was confirmed by 2 PICC nurses and 3CG printout was placed on patients chart. Blood return verified and flushed with 20ml NS in each port. Sterile dressing applied with Biopatch, Stat loc, occlusive dressing per protocol. Curios caps applied to each port. Reason for access (Long Term Antibiotics for 6 weeks). Complications related to insertion: Initially attempted Right basilic and cephalic, however unable to pass wire. Patient tolerated procedure well with minimal blood loss. PICC procedure performed by: Dania Nino, RN, BSN, [facility]-BC/ Vascular Access Nurse  Assisted by: Kaci Chamorro RN, BSN, CRNI / Vascular Access Nurse    LEFT  arm circumference: 42 cm. Catheter internal length:  46 cm  Catheter external length: 0 cm  TOTAL Length:46 cm  PICC catheter occupies 15% of vein    Brand of catheter:  Starlett Jess  Lot #[id number]   REF# [id number]    EXP 04/30/2023.     Primary nurse Jordan Carrera, notified PICC line may be used and to hang new infusion tubing prior to use.         Nicholas Briones, RN, BSN, [facility]  Vascular Access Team

## 2022-05-12 NOTE — PROGRESS NOTES
Hospitalist Progress Note    NAME: Joe Grossman   :  1960   MRN:  [id number]       Assessment / Plan:    Left hip fracture displaced POA age undetermined  ? left hip osteomyelitis POA  Bilateral buttock and sacral wounds POA  She  reports trauma years ago, denies any recently  She is very vague in terms of how long this has been going on  Found on CT scan done in the emergency room rule out osteomyelitis  Original reason sent to the ED was because of worsening buttock/sacral wounds  MRI was done which is more suggestive of fracture and osteomyelitis  [facility] evaluated no surgical intervention planned  As needed oxycodone  wound care on case  S/p  Aspiration/Bx by IR, F/u Cx results NGTD  Got  Vanco/zosyn in ED and then started on Levaquin. ID on case  Changed abx to vanc/cefepime while here  D/w  ID 22 ok to discharge  need 6 weeks IV abx with Dapto/cefepime at D/C till  per ID note from 22  She has ID virtual f/u  at 330pm  No statins while on Daptomycin  PICC placed today  Waiting for SNF acceptance and approval for IV ABx    Diarrhea at home-C Difficile not send here, resolved     Possible UTI POA-   NGTD on URx, already got Abx     Chronic hypoxic respiratory failure/DHF   Baseline on 3 to 4 L NC O2    COPD   stable on 3-4LNC, her baseline requirement.    Recent admission  to 2022 with shortness of breath and hypoxia  Treated with a course of steroids, doxycycline, diuresis  Discharged home at baseline  Continue diuresis  With lasix  Currently off prednisone  Continue nebs and oxygen      Paroxysmal A.  Fib  Not on  AC as nosebleeds with aspirin.    Multiple prior cardioversion procedures  Continue metoprolol/amio as per cards  On xarelto per cards  Currently in normal sinus rhythm     Steroid induced hyperglycemia  Glycemic last admit on steroids   A1c level 5.4 on 4/21       HTN controlled. Shanegonzaleztoy Crowell' PTA meds.      Morbid obesity/Impaired mobility/functional quadriplegia  Does not ambulate for over 2 years  With her daughter, having increasing difficulty caring for the patient        DVT prophylaxis with xarelto     Code status:full code  NOK: Daughter  Recommended Disposition:SNF  Anticipated Discharge: 5/13/22 pending SNF acceptance on IV abx as per ID note 5/9/22. PICC line ordered for  today and needs approval for IV abx at SNF per CM. Subjective: Follow-up visit for hip pain   Patient seen and examined, chart was reviewed. Patient was comfortable in bed without any acute issues reported to me by staff at this time. Pain reasonably controlled with IV morphine. D/ W ID on PS Hip Joint Cx NGTD and ok to d/c to SNF on IV abx as outlined in note from 5/9. Objective:     VITALS:   Last 24hrs VS reviewed since prior progress note. Most recent are:  Patient Vitals for the past 24 hrs:   Temp Pulse Resp BP SpO2   05/12/22 1029 98.2 °F (36.8 °C) 72 26 (!) 131/55 94 %   05/12/22 0713 97.9 °F (36.6 °C) 82 22 (!) 151/63 94 %   05/12/22 0332 98.3 °F (36.8 °C) 72 23 116/62 97 %   05/11/22 2315 98.3 °F (36.8 °C) 73 21 (!) 128/53 97 %   05/11/22 2000 98.3 °F (36.8 °C) 80 20 (!) 152/56 90 %   05/11/22 1531 98.5 °F (36.9 °C) 77 22 (!) 148/58 100 %       Intake/Output Summary (Last 24 hours) at 5/12/2022 1129  Last data filed at 5/12/2022 1103  Gross per 24 hour   Intake 900 ml   Output 2350 ml   Net -1450 ml        I had a face to face encounter, and independently examined this patient on 5/12/2022, as outlined below:    PHYSICAL EXAM:  General:           Chronic ill looking  HEENT:           Normocephalic                  Neck:                Short  Lungs:             No accessory muscle use  . Heart:              RRR,  LE edema  Abdomen:        Soft, non-tender.  Obese    Skin:                bilateral feet scaly skin   neurologic:      Alert and oriented X 3, follows commands   Psych             normal affect                                Labs       Pertinent labs include:  No results for input(s): WBC, HGB, HCT, PLT, HGBEXT, HCTEXT, PLTEXT, HGBEXT, HCTEXT, PLTEXT in the last 72 hours. No results for input(s): NA, K, CL, CO2, GLU, BUN, CREA, CA, MG, PHOS, ALB, TBIL, TBILI, ALT, INR, INREXT, INREXT in the last 72 hours. No lab exists for component: SGOT  MRI PELV W WO CONT    Result Date: 5/4/2022  1. Redemonstrated left femoral neck displaced fracture with fluid in the fracture gap and edema/enhancement and surrounding marrow and soft tissues. Findings may reflect a healing fracture with possible superimposed osteomyelitis/osteonecrosis. 2.  Right sacral and bilateral parasymphyseal pubic insufficiency fractures. 3.  RIGHT femoral head and LEFT greater trochanter nondisplaced fractures. CT BX BONE DP NDL/TROCAR    Result Date: 5/5/2022  Successful CT-guided lavage and aspiration of fluid from space between fracture fragments of the left hip. CT ABD PELV W CONT    Result Date: 5/3/2022  1. Displaced left femoral neck fracture deformity with aggressive erosive changes and sclerosis along the fracture margin (570-21). Consider acute osteomyelitis and/or septic arthritis given the clinical presentation. 2.  Bilateral nonobstructing nephrolithiasis. 3.  Anusha mesentery, nonspecific finding which can be seen with enteritis etiologies (e.g., mesenteric panniculitis, enteritis, pancreatitis, etc.)     XR CHEST PORT    Result Date: 5/3/2022  Improved congestion and pulmonary edema with possible left pleural effusion. No results found. 04/21/22    ECHO ADULT COMPLETE 04/23/2022 4/23/2022    Interpretation Summary    Left Ventricle: Normal left ventricular systolic function with a visually estimated EF of 60 - 65%. Left ventricle size is normal. Normal wall thickness. Normal wall motion. Normal diastolic function.     Technical qualifiers: Echo study was technically difficult with poor endocardial visualization and technically difficult due to patient's body habitus.     Signed by: Berlin Armas MD on 4/23/2022  6:39 AM       Current Medications:     Current Facility-Administered Medications:     VANCOMYCIN INFORMATION NOTE, , Other, ONCE, Michael Smith MD    vancomycin (VANCOCIN) 1,000 mg in 0.9% sodium chloride 250 mL (VIAL-MATE), 1,000 mg, IntraVENous, Q12H, Michael Smith MD, Last Rate: 250 mL/hr at 05/12/22 0135, 1,000 mg at 05/12/22 0135    cyclobenzaprine (FLEXERIL) tablet 10 mg, 10 mg, Oral, TID PRN, Michael Smith MD, 10 mg at 05/10/22 0914    oxyCODONE IR (ROXICODONE) tablet 5-10 mg, 5-10 mg, Oral, Q4H PRN, Michael Smith MD, 10 mg at 05/11/22 2134    albuterol-ipratropium (DUO-NEB) 2.5 MG-0.5 MG/3 ML, 3 mL, Nebulization, Q4H PRN, Michael Smith MD    amiodarone (CORDARONE) tablet 400 mg, 400 mg, Oral, DAILY, Peggy Walters NP, 400 mg at 05/12/22 6511    rivaroxaban (XARELTO) tablet 20 mg, 20 mg, Oral, DAILY, Peggy Walters NP, 20 mg at 05/12/22 0843    [COMPLETED] cefepime (MAXIPIME) 2 g in 0.9% sodium chloride 10 mL IV syringe, 2 g, IntraVENous, NOW, 2 g at 05/06/22 1405 **FOLLOWED BY** cefepime (MAXIPIME) 2 g in 0.9% sodium chloride (MBP/ADV) 100 mL MBP, 2 g, IntraVENous, Q8H, Adán Mae MD, Last Rate: 25 mL/hr at 05/12/22 0507, 2 g at 05/12/22 0507    L.acidophilus-paracasei-S.thermophil-bifidobacter (RISAQUAD) 8 billion cell capsule, 1 Capsule, Oral, DAILY, Adán Mae MD, 1 Capsule at 05/12/22 0843    balsam peru-castor oiL (VENELEX) ointment, , Topical, BID, Phuc Blake MD, Given at 05/12/22 0844    sodium chloride (NS) flush 5-40 mL, 5-40 mL, IntraVENous, Q8H, Nils Choe MD, 10 mL at 05/12/22 0508    sodium chloride (NS) flush 5-40 mL, 5-40 mL, IntraVENous, PRN, Nils Choe MD    acetaminophen (TYLENOL) tablet 650 mg, 650 mg, Oral, Q6H PRN, 650 mg at 05/09/22 3432 **OR** acetaminophen (TYLENOL) suppository 650 mg, 650 mg, Rectal, Q6H PRN, Patricia Choe MD    polyethylene glycol (MIRALAX) packet 17 g, 17 g, Oral, DAILY, Patricia Choe MD, 17 g at 05/11/22 0849    bisacodyL (DULCOLAX) tablet 5 mg, 5 mg, Oral, DAILY PRN, Patricia Choe MD    promethazine (PHENERGAN) tablet 12.5 mg, 12.5 mg, Oral, Q6H PRN **OR** ondansetron (ZOFRAN) injection 4 mg, 4 mg, IntraVENous, Q6H PRN, Patricia Choe MD    atorvastatin (LIPITOR) tablet 10 mg, 10 mg, Oral, QHS, Patricia Choe MD, 10 mg at 05/11/22 2133    metoprolol tartrate (LOPRESSOR) tablet 25 mg, 25 mg, Oral, BID, Patricia Choe MD, 25 mg at 05/12/22 9350    furosemide (LASIX) tablet 40 mg, 40 mg, Oral, DAILY, Patricia Choe MD, 40 mg at 05/12/22 0843    miconazole (SECURA) 2 % extra thick cream, , Topical, BID, Jose Lui MD, Given at 05/12/22 0844    ammonium lactate (LAC-HYDRIN) 12 % lotion, , Topical, BID, Jose Lui MD, Given at 05/12/22 8324       ________________________________________________________________________  Yves Brooks MD

## 2022-05-13 LAB
ANION GAP SERPL CALC-SCNC: 0 MMOL/L (ref 5–15)
ARTERIAL PATENCY WRIST A: ABNORMAL
BASE EXCESS BLDV CALC-SCNC: 11.9 MMOL/L
BDY SITE: ABNORMAL
BUN SERPL-MCNC: 13 MG/DL (ref 6–20)
BUN/CREAT SERPL: 32 (ref 12–20)
CALCIUM SERPL-MCNC: 8.5 MG/DL (ref 8.5–10.1)
CHLORIDE SERPL-SCNC: 95 MMOL/L (ref 97–108)
CO2 SERPL-SCNC: 41 MMOL/L (ref 21–32)
COVID-19 RAPID TEST, COVR: NOT DETECTED
CREAT SERPL-MCNC: 0.41 MG/DL (ref 0.55–1.02)
ERYTHROCYTE [DISTWIDTH] IN BLOOD BY AUTOMATED COUNT: 15.9 % (ref 11.5–14.5)
GAS FLOW.O2 O2 DELIVERY SYS: ABNORMAL L/MIN
GLUCOSE SERPL-MCNC: 129 MG/DL (ref 65–100)
HCO3 BLDV-SCNC: 38.8 MMOL/L (ref 23–28)
HCT VFR BLD AUTO: 35.6 % (ref 35–47)
HGB BLD-MCNC: 10.7 G/DL (ref 11.5–16)
MCH RBC QN AUTO: 30.7 PG (ref 26–34)
MCHC RBC AUTO-ENTMCNC: 30.1 G/DL (ref 30–36.5)
MCV RBC AUTO: 102 FL (ref 80–99)
NRBC # BLD: 0 K/UL (ref 0–0.01)
NRBC BLD-RTO: 0 PER 100 WBC
O2/TOTAL GAS SETTING VFR VENT: 2 %
PCO2 BLDV: 61.4 MMHG (ref 41–51)
PH BLDV: 7.41 [PH] (ref 7.32–7.42)
PLATELET # BLD AUTO: 140 K/UL (ref 150–400)
PMV BLD AUTO: 10.4 FL (ref 8.9–12.9)
PO2 BLDV: 39 MMHG (ref 25–40)
POTASSIUM SERPL-SCNC: 4.1 MMOL/L (ref 3.5–5.1)
RBC # BLD AUTO: 3.49 M/UL (ref 3.8–5.2)
SAO2 % BLDV: 72 % (ref 65–88)
SERVICE CMNT-IMP: ABNORMAL
SODIUM SERPL-SCNC: 136 MMOL/L (ref 136–145)
SOURCE, COVRS: NORMAL
SPECIMEN TYPE: ABNORMAL
WBC # BLD AUTO: 9 K/UL (ref 3.6–11)

## 2022-05-13 PROCEDURE — 85027 COMPLETE CBC AUTOMATED: CPT

## 2022-05-13 PROCEDURE — 65270000046 HC RM TELEMETRY

## 2022-05-13 PROCEDURE — 74011250637 HC RX REV CODE- 250/637: Performed by: INTERNAL MEDICINE

## 2022-05-13 PROCEDURE — 74011250637 HC RX REV CODE- 250/637: Performed by: NURSE PRACTITIONER

## 2022-05-13 PROCEDURE — 74011250637 HC RX REV CODE- 250/637: Performed by: STUDENT IN AN ORGANIZED HEALTH CARE EDUCATION/TRAINING PROGRAM

## 2022-05-13 PROCEDURE — 74011250637 HC RX REV CODE- 250/637: Performed by: HOSPITALIST

## 2022-05-13 PROCEDURE — 74011000250 HC RX REV CODE- 250: Performed by: INTERNAL MEDICINE

## 2022-05-13 PROCEDURE — 36415 COLL VENOUS BLD VENIPUNCTURE: CPT

## 2022-05-13 PROCEDURE — 74011000258 HC RX REV CODE- 258: Performed by: INTERNAL MEDICINE

## 2022-05-13 PROCEDURE — 87635 SARS-COV-2 COVID-19 AMP PRB: CPT

## 2022-05-13 PROCEDURE — 74011250636 HC RX REV CODE- 250/636: Performed by: HOSPITALIST

## 2022-05-13 PROCEDURE — 74011250636 HC RX REV CODE- 250/636: Performed by: INTERNAL MEDICINE

## 2022-05-13 PROCEDURE — 80048 BASIC METABOLIC PNL TOTAL CA: CPT

## 2022-05-13 PROCEDURE — 82803 BLOOD GASES ANY COMBINATION: CPT

## 2022-05-13 RX ORDER — GABAPENTIN 100 MG/1
100 CAPSULE ORAL 3 TIMES DAILY
Status: DISCONTINUED | OUTPATIENT
Start: 2022-05-13 | End: 2022-05-17 | Stop reason: HOSPADM

## 2022-05-13 RX ADMIN — RIVAROXABAN 20 MG: 20 TABLET, FILM COATED ORAL at 09:55

## 2022-05-13 RX ADMIN — CEFEPIME HYDROCHLORIDE 2 G: 2 INJECTION, POWDER, FOR SOLUTION INTRAVENOUS at 05:25

## 2022-05-13 RX ADMIN — CEFEPIME HYDROCHLORIDE 2 G: 2 INJECTION, POWDER, FOR SOLUTION INTRAVENOUS at 13:32

## 2022-05-13 RX ADMIN — VANCOMYCIN HYDROCHLORIDE 1000 MG: 1 INJECTION, POWDER, LYOPHILIZED, FOR SOLUTION INTRAVENOUS at 02:32

## 2022-05-13 RX ADMIN — CEFEPIME HYDROCHLORIDE 2 G: 2 INJECTION, POWDER, FOR SOLUTION INTRAVENOUS at 21:26

## 2022-05-13 RX ADMIN — CASTOR OIL AND BALSAM, PERU: 788; 87 OINTMENT TOPICAL at 09:56

## 2022-05-13 RX ADMIN — Medication 1 CAPSULE: at 09:55

## 2022-05-13 RX ADMIN — SODIUM CHLORIDE, PRESERVATIVE FREE 10 ML: 5 INJECTION INTRAVENOUS at 21:27

## 2022-05-13 RX ADMIN — OXYCODONE 10 MG: 5 TABLET ORAL at 10:29

## 2022-05-13 RX ADMIN — MICONAZOLE NITRATE: 20 CREAM TOPICAL at 21:27

## 2022-05-13 RX ADMIN — CASTOR OIL AND BALSAM, PERU: 788; 87 OINTMENT TOPICAL at 21:27

## 2022-05-13 RX ADMIN — VANCOMYCIN HYDROCHLORIDE 1000 MG: 1 INJECTION, POWDER, LYOPHILIZED, FOR SOLUTION INTRAVENOUS at 13:32

## 2022-05-13 RX ADMIN — SODIUM CHLORIDE, PRESERVATIVE FREE 10 ML: 5 INJECTION INTRAVENOUS at 14:00

## 2022-05-13 RX ADMIN — GABAPENTIN 100 MG: 100 CAPSULE ORAL at 21:26

## 2022-05-13 RX ADMIN — MICONAZOLE NITRATE: 20 CREAM TOPICAL at 09:57

## 2022-05-13 RX ADMIN — Medication: at 09:57

## 2022-05-13 RX ADMIN — MICONAZOLE NITRATE: 20 CREAM TOPICAL at 00:27

## 2022-05-13 RX ADMIN — CASTOR OIL AND BALSAM, PERU: 788; 87 OINTMENT TOPICAL at 00:26

## 2022-05-13 RX ADMIN — SODIUM CHLORIDE, PRESERVATIVE FREE 10 ML: 5 INJECTION INTRAVENOUS at 00:30

## 2022-05-13 RX ADMIN — OXYCODONE 10 MG: 5 TABLET ORAL at 18:27

## 2022-05-13 RX ADMIN — Medication: at 18:31

## 2022-05-13 RX ADMIN — METOPROLOL TARTRATE 25 MG: 25 TABLET, FILM COATED ORAL at 18:27

## 2022-05-13 RX ADMIN — METOPROLOL TARTRATE 25 MG: 25 TABLET, FILM COATED ORAL at 09:55

## 2022-05-13 RX ADMIN — AMIODARONE HYDROCHLORIDE 400 MG: 200 TABLET ORAL at 09:55

## 2022-05-13 NOTE — PROGRESS NOTES
Problem: Risk for Spread of Infection  Goal: Prevent transmission of infectious organism to others  Description: Prevent the transmission of infectious organisms to other patients, staff members, and visitors. Outcome: Progressing Towards Goal     Problem: Falls - Risk of  Goal: *Absence of Falls  Description: Document Mililani McRae Helena Fall Risk and appropriate interventions in the flowsheet.   Outcome: Progressing Towards Goal  Note: Fall Risk Interventions:       Mentation Interventions: Adequate sleep, hydration, pain control,Bed/chair exit alarm,Increase mobility,More frequent rounding,Toileting rounds,Update white board    Medication Interventions: Bed/chair exit alarm,Patient to call before getting OOB,Teach patient to arise slowly    Elimination Interventions: Bed/chair exit alarm,Call light in reach,Patient to call for help with toileting needs,Stay With Me (per policy),Toilet paper/wipes in reach,Toileting schedule/hourly rounds

## 2022-05-13 NOTE — PROGRESS NOTES
Hospitalist Progress Note    NAME: Shanti Childs   :  1960   MRN:  [id number]       Assessment / Plan:    Left hip fracture displaced POA age undetermined  ? left hip osteomyelitis POA  Bilateral buttock and sacral wounds POA  She  reports trauma years ago, denies any recently  She is very vague in terms of how long this has been going on  Found on CT scan done in the emergency room rule out osteomyelitis  Original reason sent to the ED was because of worsening buttock/sacral wounds  MRI was done which is more suggestive of fracture and osteomyelitis  [facility] evaluated no surgical intervention planned  As needed oxycodone and morphine  wound care on case  S/p  Aspiration/Bx by IR, culture NGTD. Pathology results ? Pending  ID on board. On vancomycin and cefepime currently. ID recommends daptomycin and cefepime on discharge. End date . No statin while on daptomycin. ID follow-up on   Complaints of bilateral lower extremity pain. Has some numbness. We will start on low-dose gabapentin    Diarrhea at home-resolved     Possible UTI POA-   NGTD on URx, already got Abx     Chronic hypoxic respiratory failure/DHF   Baseline on 3 to 4 L NC O2    COPD   stable on 3-4LNC, her baseline requirement.    Recent admission  to 2022 with shortness of breath and hypoxia  Treated with a course of steroids, doxycycline, diuresis  Discharged home at baseline  Holding Lasix today due to contraction alkalosis. Follow BMP. Currently off prednisone  Continue nebs and oxygen      Paroxysmal A.  Fib  Not on  AC as nosebleeds with aspirin.    Multiple prior cardioversion procedures  Continue metoprolol/amio as per cards  On xarelto per cards  Currently in normal sinus rhythm     Steroid induced hyperglycemia  Glycemic last admit on steroids   A1c level 5.4 on        HTN controlled.  Cont' PTA meds.      Morbid obesity/Impaired mobility/functional quadriplegia  Does not ambulate for over 2 years  With her daughter, having increasing difficulty caring for the patient        DVT prophylaxis with xarelto     Code status:full code  NOK: Daughter  Recommended Disposition:SNF  Anticipated Discharge Date: Medically stable for discharge     Subjective: Follow-up visit for hip pain   Patient seen and examined, chart was reviewed. Complains of bilateral leg pain. Asks if she can take both the morphine and oxycodone at the same time    Objective:     VITALS:   Last 24hrs VS reviewed since prior progress note. Most recent are:  Patient Vitals for the past 24 hrs:   Temp Pulse Resp BP SpO2   05/13/22 0805 98.6 °F (37 °C) 92 23 114/61 96 %   05/13/22 0023 98.5 °F (36.9 °C) 85 20 (!) 143/54 96 %   05/12/22 2200 -- 76 -- (!) 125/59 93 %   05/12/22 2000 98.4 °F (36.9 °C) 78 23 (!) 159/58 94 %   05/12/22 1510 -- 78 26 (!) 148/58 92 %   05/12/22 1029 98.2 °F (36.8 °C) 72 26 (!) 131/55 94 %       Intake/Output Summary (Last 24 hours) at 5/13/2022 0839  Last data filed at 5/13/2022 0022  Gross per 24 hour   Intake --   Output 1900 ml   Net -1900 ml        I had a face to face encounter, and independently examined this patient on 5/13/2022, as outlined below:    PHYSICAL EXAM:  General:           Chronic ill looking  HEENT:           Normocephalic                  Neck:                Short  Lungs:             No accessory muscle use  . Heart:              RRR,  LE edema  Abdomen:        Soft, non-tender.  Obese    Skin:                bilateral feet scaly skin   neurologic:      Alert and oriented X 3, follows commands   Psych             normal affect                                Labs       Pertinent labs include:  Recent Labs     05/13/22 0028   WBC 9.0   HGB 10.7*   HCT 35.6   *     Recent Labs     05/13/22 0028      K 4.1   CL 95*   CO2 41*   *   BUN 13   CREA 0.41*   CA 8.5     MRI PELV W WO CONT    Result Date: 5/4/2022  1. Redemonstrated left femoral neck displaced fracture with fluid in the fracture gap and edema/enhancement and surrounding marrow and soft tissues. Findings may reflect a healing fracture with possible superimposed osteomyelitis/osteonecrosis. 2.  Right sacral and bilateral parasymphyseal pubic insufficiency fractures. 3.  RIGHT femoral head and LEFT greater trochanter nondisplaced fractures. CT BX BONE DP NDL/TROCAR    Result Date: 5/5/2022  Successful CT-guided lavage and aspiration of fluid from space between fracture fragments of the left hip. CT ABD PELV W CONT    Result Date: 5/3/2022  1. Displaced left femoral neck fracture deformity with aggressive erosive changes and sclerosis along the fracture margin (733-43). Consider acute osteomyelitis and/or septic arthritis given the clinical presentation. 2.  Bilateral nonobstructing nephrolithiasis. 3.  Anusha mesentery, nonspecific finding which can be seen with enteritis etiologies (e.g., mesenteric panniculitis, enteritis, pancreatitis, etc.)     XR CHEST PORT    Result Date: 5/3/2022  Improved congestion and pulmonary edema with possible left pleural effusion. No results found. 04/21/22    ECHO ADULT COMPLETE 04/23/2022 4/23/2022    Interpretation Summary    Left Ventricle: Normal left ventricular systolic function with a visually estimated EF of 60 - 65%. Left ventricle size is normal. Normal wall thickness. Normal wall motion. Normal diastolic function.   Technical qualifiers: Echo study was technically difficult with poor endocardial visualization and technically difficult due to patient's body habitus.     Signed by: Ladene Fothergill, MD on 4/23/2022  6:39 AM       Current Medications:     Current Facility-Administered Medications:     morphine injection 2 mg, 2 mg, IntraVENous, Q4H PRN, SarahMichael lui MD    vancomycin (VANCOCIN) 1,000 mg in 0.9% sodium chloride 250 mL (VIAL-MATE), 1,000 mg, IntraVENous, Q12H, SarahMichael lui MD, Last Rate: 250 mL/hr at 05/13/22 0232, 1,000 mg at 05/13/22 0232    cyclobenzaprine (FLEXERIL) tablet 10 mg, 10 mg, Oral, TID PRN, Michael Smith MD, 10 mg at 05/10/22 0914    oxyCODONE IR (ROXICODONE) tablet 5-10 mg, 5-10 mg, Oral, Q4H PRN, SarahMichael lui MD, 10 mg at 05/12/22 2304    albuterol-ipratropium (DUO-NEB) 2.5 MG-0.5 MG/3 ML, 3 mL, Nebulization, Q4H PRN, Michael Smith MD    amiodarone (CORDARONE) tablet 400 mg, 400 mg, Oral, DAILY, [place], NP, 400 mg at 05/12/22 9205    rivaroxaban (XARELTO) tablet 20 mg, 20 mg, Oral, DAILY, [place], NP, 20 mg at 05/12/22 0843    [COMPLETED] cefepime (MAXIPIME) 2 g in 0.9% sodium chloride 10 mL IV syringe, 2 g, IntraVENous, NOW, 2 g at 05/06/22 1405 **FOLLOWED BY** cefepime (MAXIPIME) 2 g in 0.9% sodium chloride (MBP/ADV) 100 mL MBP, 2 g, IntraVENous, Q8H, Nj Mae MD, Last Rate: 25 mL/hr at 05/13/22 0525, 2 g at 05/13/22 0525    L.acidophilus-paracasei-S.thermophil-bifidobacter (RISAQUAD) 8 billion cell capsule, 1 Capsule, Oral, DAILY, Nj Mae MD, 1 Capsule at 05/12/22 0843    balsam peru-castor oiL (VENELEX) ointment, , Topical, BID, Mil Blake MD, Given at 05/13/22 0026    sodium chloride (NS) flush 5-40 mL, 5-40 mL, IntraVENous, Q8H, Wing Loree Choe MD, 10 mL at 05/13/22 0030    sodium chloride (NS) flush 5-40 mL, 5-40 mL, IntraVENous, PRN, Wing Loree Choe MD    acetaminophen (TYLENOL) tablet 650 mg, 650 mg, Oral, Q6H PRN, 650 mg at 05/09/22 2252 **OR** acetaminophen (TYLENOL) suppository 650 mg, 650 mg, Rectal, Q6H PRN, Wing Loree Choe MD    polyethylene glycol (MIRALAX) packet 17 g, 17 g, Oral, DAILY, Wing Loree Choe MD, 17 g at 05/11/22 0849    bisacodyL (DULCOLAX) tablet 5 mg, 5 mg, Oral, DAILY PRN, Wing Loree Choe MD    promethazine (PHENERGAN) tablet 12.5 mg, 12.5 mg, Oral, Q6H PRN **OR** ondansetron (ZOFRAN) injection 4 mg, 4 mg, IntraVENous, Q6H PRN, Jennifer Villanueva MD    atorvastatin (LIPITOR) tablet 10 mg, 10 mg, Oral, QHS, Luis Choe MD, 10 mg at 05/12/22 2010    metoprolol tartrate (LOPRESSOR) tablet 25 mg, 25 mg, Oral, BID, Luis Choe MD, 25 mg at 05/12/22 1743    furosemide (LASIX) tablet 40 mg, 40 mg, Oral, DAILY, Luis Choe MD, 40 mg at 05/12/22 0843    miconazole (SECURA) 2 % extra thick cream, , Topical, BID, Cristiano Zavala MD, Given at 05/13/22 0027    ammonium lactate (LAC-HYDRIN) 12 % lotion, , Topical, BID, Cristiano Zavala MD, Given at 05/12/22 1743       ________________________________________________________________________  Rani Johns MD

## 2022-05-13 NOTE — PROGRESS NOTES
Transition of Care Plan:     RUR:  17% - moderate risk  Disposition:  [facility]    - will need rapid COVID prior to DC     -CM talked to RN and they want Pt to have Daptomycin at SNF. Pt is currently on Vancomycin and they want to switch if it is not to expensive. If they will not accept Daptomycin than they will continue current treatment.       - Pt reported she is not vaccinated against COVID. Pt would like to get COVID Vaccine.      Follow up appointments:  PCP; Specialist  DME needed:  SNF will provide.   Transportation at [address] [place] [address] to arrange.   [place] or means to access home:  vj      IM Medicare Letter:  To be given closer to dc  Is patient a BCPI-A Bundle:  No                  If yes, was Bundle Letter given?: N/A  Is patient a  and connected with the [employer] [facility],[facility] [address] [place]  If yes, was [place] transfer form completed and [facility] notified? Caregiver Contact:  Ivytoy SILVA, HGJHLTDO, [phone]  Discharge Caregiver contacted prior to discharge?   Yes  -2:49 PM CM tried to call Pt DTR in room with Pt. No answer  Care Conference needed? Not at this time. Updated Note 3:00 pm: CM at bedside to discuss discharge planning with pt. She is aware that [facility] cannot accept her this weekend. Pt wants to try: [facility] and [facility]. She will discuss with her daughter the other SNFs (just in case). CM placed referrals via [facility]. Initial Note 12:00 pm: CM informed by liaison that [address] [place] [address] would not be able to accept pt this weekend. CM to follow up. Unit CM will continue to follow.      Ekta Dean, RN, BSN, [address] [place] [address] Care Manager  [phone]

## 2022-05-13 NOTE — PROGRESS NOTES
0805 Bedside and Verbal shift change report given to Fransisco Weinsteintoy Di (oncoming nurse) by Afia Boyle RN (offgoing nurse). Report included the following information SBAR, Kardex, ED Summary, Procedure Summary, Intake/Output, MAR, Recent Results and Cardiac Rhythm NSR.     0815 morning wound care completed - see associated flowsheet for more information     1820 Covid rapid swab sent to lab    1830 Evening wound care completed - see associated flowsheet for more information     End of Shift Note    Bedside shift change report given to  (oncoming nurse) by Marissa Peck RN (offgoing nurse). Report included the following information SBAR, Kardex, ED Summary, Procedure Summary, Intake/Output, MAR, Recent Results and Cardiac Rhythm NSR    Shift worked:  7am -7pm     Shift summary and any significant changes:     covid test sent and wound care completed      Concerns for physician to address: n/a     [facility]          Activity:  Activity Level: Bed Rest  Number times ambulated in hallways past shift: 0  Number of times OOB to chair past shift: 0    Cardiac:   Cardiac Monitoring: Yes      Cardiac Rhythm: Sinus Rhythm    Access:   Current line(s): PIV and PICC     Genitourinary:   Urinary status: voiding and external catheter    Respiratory:   O2 Device: Nasal cannula  Chronic home O2 use?: YES  Incentive spirometer at bedside: NO       GI:  Last Bowel Movement Date: 05/12/22  Current diet:  ADULT DIET Easy to Chew  ADULT ORAL NUTRITION SUPPLEMENT Breakfast, Dinner; Low Calorie/High Protein  Passing flatus: YES  Tolerating current diet: YES       Pain Management:   Patient states pain is manageable on current regimen: YES    Skin:  Gaston Score: 13  Interventions: turn team, speciality bed, float heels, increase time out of bed, foam dressing, PT/OT consult, limit briefs, internal/external urinary devices and nutritional support     Patient Safety:  Fall Score:  Total Score: 2  Interventions: bed/chair alarm, assistive device (walker, cane, etc), gripper socks, pt to call before getting OOB and stay with me (per policy)       Length of Stay:  Expected LOS: 5d 21h  Actual LOS: 9      Chad Brown RN

## 2022-05-14 LAB
ANION GAP SERPL CALC-SCNC: ABNORMAL MMOL/L (ref 5–15)
BASOPHILS # BLD: 0 K/UL (ref 0–0.1)
BASOPHILS NFR BLD: 0 % (ref 0–1)
BUN SERPL-MCNC: 13 MG/DL (ref 6–20)
BUN/CREAT SERPL: 33 (ref 12–20)
CALCIUM SERPL-MCNC: 8.5 MG/DL (ref 8.5–10.1)
CHLORIDE SERPL-SCNC: 99 MMOL/L (ref 97–108)
CO2 SERPL-SCNC: 41 MMOL/L (ref 21–32)
CREAT SERPL-MCNC: 0.39 MG/DL (ref 0.55–1.02)
DIFFERENTIAL METHOD BLD: ABNORMAL
EOSINOPHIL # BLD: 0.3 K/UL (ref 0–0.4)
EOSINOPHIL NFR BLD: 5 % (ref 0–7)
ERYTHROCYTE [DISTWIDTH] IN BLOOD BY AUTOMATED COUNT: 15.7 % (ref 11.5–14.5)
GLUCOSE BLD STRIP.AUTO-MCNC: 148 MG/DL (ref 65–117)
GLUCOSE SERPL-MCNC: 100 MG/DL (ref 65–100)
HCT VFR BLD AUTO: 35.4 % (ref 35–47)
HGB BLD-MCNC: 10.2 G/DL (ref 11.5–16)
IMM GRANULOCYTES # BLD AUTO: 0.1 K/UL (ref 0–0.04)
IMM GRANULOCYTES NFR BLD AUTO: 1 % (ref 0–0.5)
LYMPHOCYTES # BLD: 1.1 K/UL (ref 0.8–3.5)
LYMPHOCYTES NFR BLD: 18 % (ref 12–49)
MCH RBC QN AUTO: 31.1 PG (ref 26–34)
MCHC RBC AUTO-ENTMCNC: 28.8 G/DL (ref 30–36.5)
MCV RBC AUTO: 107.9 FL (ref 80–99)
MONOCYTES # BLD: 0.5 K/UL (ref 0–1)
MONOCYTES NFR BLD: 8 % (ref 5–13)
NEUTS SEG # BLD: 4.3 K/UL (ref 1.8–8)
NEUTS SEG NFR BLD: 68 % (ref 32–75)
NRBC # BLD: 0 K/UL (ref 0–0.01)
NRBC BLD-RTO: 0 PER 100 WBC
PLATELET # BLD AUTO: 134 K/UL (ref 150–400)
PMV BLD AUTO: 10.8 FL (ref 8.9–12.9)
POTASSIUM SERPL-SCNC: 4.1 MMOL/L (ref 3.5–5.1)
RBC # BLD AUTO: 3.28 M/UL (ref 3.8–5.2)
RBC MORPH BLD: ABNORMAL
SERVICE CMNT-IMP: ABNORMAL
SODIUM SERPL-SCNC: 138 MMOL/L (ref 136–145)
WBC # BLD AUTO: 6.3 K/UL (ref 3.6–11)

## 2022-05-14 PROCEDURE — 74011250637 HC RX REV CODE- 250/637: Performed by: INTERNAL MEDICINE

## 2022-05-14 PROCEDURE — 74011250637 HC RX REV CODE- 250/637: Performed by: NURSE PRACTITIONER

## 2022-05-14 PROCEDURE — 65270000046 HC RM TELEMETRY

## 2022-05-14 PROCEDURE — 80048 BASIC METABOLIC PNL TOTAL CA: CPT

## 2022-05-14 PROCEDURE — 82962 GLUCOSE BLOOD TEST: CPT

## 2022-05-14 PROCEDURE — 36415 COLL VENOUS BLD VENIPUNCTURE: CPT

## 2022-05-14 PROCEDURE — 74011000250 HC RX REV CODE- 250: Performed by: INTERNAL MEDICINE

## 2022-05-14 PROCEDURE — 74011250636 HC RX REV CODE- 250/636: Performed by: INTERNAL MEDICINE

## 2022-05-14 PROCEDURE — 74011000258 HC RX REV CODE- 258: Performed by: INTERNAL MEDICINE

## 2022-05-14 PROCEDURE — 85025 COMPLETE CBC W/AUTO DIFF WBC: CPT

## 2022-05-14 PROCEDURE — 74011250637 HC RX REV CODE- 250/637: Performed by: HOSPITALIST

## 2022-05-14 PROCEDURE — 74011250637 HC RX REV CODE- 250/637: Performed by: STUDENT IN AN ORGANIZED HEALTH CARE EDUCATION/TRAINING PROGRAM

## 2022-05-14 PROCEDURE — 74011250636 HC RX REV CODE- 250/636: Performed by: HOSPITALIST

## 2022-05-14 RX ADMIN — SODIUM CHLORIDE, PRESERVATIVE FREE 10 ML: 5 INJECTION INTRAVENOUS at 16:31

## 2022-05-14 RX ADMIN — RIVAROXABAN 20 MG: 20 TABLET, FILM COATED ORAL at 09:43

## 2022-05-14 RX ADMIN — METOPROLOL TARTRATE 25 MG: 25 TABLET, FILM COATED ORAL at 18:09

## 2022-05-14 RX ADMIN — GABAPENTIN 100 MG: 100 CAPSULE ORAL at 09:43

## 2022-05-14 RX ADMIN — Medication 1 CAPSULE: at 09:42

## 2022-05-14 RX ADMIN — AMIODARONE HYDROCHLORIDE 400 MG: 200 TABLET ORAL at 09:42

## 2022-05-14 RX ADMIN — VANCOMYCIN HYDROCHLORIDE 1000 MG: 1 INJECTION, POWDER, LYOPHILIZED, FOR SOLUTION INTRAVENOUS at 01:54

## 2022-05-14 RX ADMIN — CEFEPIME HYDROCHLORIDE 2 G: 2 INJECTION, POWDER, FOR SOLUTION INTRAVENOUS at 13:41

## 2022-05-14 RX ADMIN — VANCOMYCIN HYDROCHLORIDE 1000 MG: 1 INJECTION, POWDER, LYOPHILIZED, FOR SOLUTION INTRAVENOUS at 13:42

## 2022-05-14 RX ADMIN — OXYCODONE 10 MG: 5 TABLET ORAL at 18:09

## 2022-05-14 RX ADMIN — CASTOR OIL AND BALSAM, PERU: 788; 87 OINTMENT TOPICAL at 09:00

## 2022-05-14 RX ADMIN — SODIUM CHLORIDE, PRESERVATIVE FREE 10 ML: 5 INJECTION INTRAVENOUS at 21:18

## 2022-05-14 RX ADMIN — Medication: at 09:47

## 2022-05-14 RX ADMIN — CASTOR OIL AND BALSAM, PERU: 788; 87 OINTMENT TOPICAL at 21:18

## 2022-05-14 RX ADMIN — CEFEPIME HYDROCHLORIDE 2 G: 2 INJECTION, POWDER, FOR SOLUTION INTRAVENOUS at 04:22

## 2022-05-14 RX ADMIN — METOPROLOL TARTRATE 25 MG: 25 TABLET, FILM COATED ORAL at 09:43

## 2022-05-14 RX ADMIN — GABAPENTIN 100 MG: 100 CAPSULE ORAL at 18:09

## 2022-05-14 RX ADMIN — MICONAZOLE NITRATE: 20 CREAM TOPICAL at 09:48

## 2022-05-14 RX ADMIN — Medication: at 18:39

## 2022-05-14 RX ADMIN — OXYCODONE 10 MG: 5 TABLET ORAL at 04:22

## 2022-05-14 RX ADMIN — CEFEPIME HYDROCHLORIDE 2 G: 2 INJECTION, POWDER, FOR SOLUTION INTRAVENOUS at 21:14

## 2022-05-14 RX ADMIN — MICONAZOLE NITRATE: 20 CREAM TOPICAL at 21:18

## 2022-05-14 RX ADMIN — SODIUM CHLORIDE, PRESERVATIVE FREE 10 ML: 5 INJECTION INTRAVENOUS at 05:26

## 2022-05-14 RX ADMIN — GABAPENTIN 100 MG: 100 CAPSULE ORAL at 21:14

## 2022-05-14 NOTE — PROGRESS NOTES
Problem: Risk for Spread of Infection  Goal: Prevent transmission of infectious organism to others  Description: Prevent the transmission of infectious organisms to other patients, staff members, and visitors. Outcome: Progressing Towards Goal     Problem: Patient Education:  Go to Education Activity  Goal: Patient/Family Education  Outcome: Progressing Towards Goal     Problem: Falls - Risk of  Goal: *Absence of Falls  Description: Document Marciana Distance Fall Risk and appropriate interventions in the flowsheet.   Outcome: Progressing Towards Goal  Note: Fall Risk Interventions:       Mentation Interventions: Adequate sleep, hydration, pain control,Bed/chair exit alarm,Increase mobility,Reorient patient    Medication Interventions: Patient to call before getting OOB,Teach patient to arise slowly    Elimination Interventions: Call light in reach,Patient to call for help with toileting needs,Stay With Me (per policy),Toilet paper/wipes in reach,Toileting schedule/hourly rounds

## 2022-05-14 NOTE — PROGRESS NOTES
Hospitalist Progress Note    NAME: Birgit Bolivar   :  1960   MRN:  [id number]       Assessment / Plan:    Left hip fracture displaced POA age undetermined  ? left hip osteomyelitis POA  Bilateral buttock and sacral wounds POA  She  reports trauma years ago, denies any recently  She is very vague in terms of how long this has been going on  Found on CT scan done in the emergency room rule out osteomyelitis  Original reason sent to the ED was because of worsening buttock/sacral wounds  MRI was done which is more suggestive of fracture and osteomyelitis  [facility] evaluated no surgical intervention planned  As needed oxycodone and morphine  wound care on case  S/p  Aspiration?/Bx by IR, culture NGTD. Pathology results ? Pending  ID on board. On vancomycin and cefepime currently. ID recommends daptomycin and cefepime on discharge. End date . No statin while on daptomycin. ID follow-up on   Complaints of bilateral lower extremity pain. Has some numbness. Started on low-dose gabapentin  Medically stable for discharge awaiting placement    Diarrhea at home-resolved     Possible UTI POA-   NGTD on URx, already got Abx     Chronic hypoxic respiratory failure/DHF   Baseline on 3 to 4 L NC O2    COPD   stable on 3-4LNC, her baseline requirement.    Recent admission  to 2022 with shortness of breath and hypoxia  Treated with a course of steroids, doxycycline, diuresis  Discharged home at baseline  Holding Lasix due to contraction alkalosis. Follow BMP. Currently off prednisone  Continue nebs and oxygen      Paroxysmal A.  Fib  Not on  AC as nosebleeds with aspirin.    Multiple prior cardioversion procedures  Continue metoprolol/amio as per cards  On xarelto per cards  Currently in normal sinus rhythm     Steroid induced hyperglycemia  Glycemic last admit on steroids   A1c level 5.4 on 4/21       HTN controlled. Martina Daviscarlos manuel' PTA meds.      Morbid obesity/Impaired mobility/functional quadriplegia  Does not ambulate for over 2 years  With her daughter, having increasing difficulty caring for the patient        DVT prophylaxis with xarelto     Code status:full code  NOK: Daughter  Recommended Disposition:SNF  Anticipated Discharge Date: Medically stable for discharge     Subjective: Follow-up visit for hip pain   Patient seen and examined, chart was reviewed. Resting in bed   No complaints    Objective:     VITALS:   Last 24hrs VS reviewed since prior progress note. Most recent are:  Patient Vitals for the past 24 hrs:   Temp Pulse Resp BP SpO2   05/14/22 0710 98.1 °F (36.7 °C) 82 22 (!) 125/53 99 %   05/14/22 0419 98.1 °F (36.7 °C) 79 23 (!) 132/57 98 %   05/13/22 2314 98.5 °F (36.9 °C) 83 23 131/63 96 %   05/13/22 2000 99.1 °F (37.3 °C) 82 28 (!) 118/57 100 %   05/13/22 1827 -- 84 -- (!) 151/61 --   05/13/22 1513 98 °F (36.7 °C) 79 24 138/66 94 %   05/13/22 1113 98.4 °F (36.9 °C) 86 27 114/83 93 %       Intake/Output Summary (Last 24 hours) at 5/14/2022 0853  Last data filed at 5/14/2022 0419  Gross per 24 hour   Intake 1290 ml   Output 2700 ml   Net -1410 ml        I had a face to face encounter, and independently examined this patient on 5/14/2022, as outlined below:    PHYSICAL EXAM:  General:           Chronic ill looking  HEENT:           Normocephalic                  Neck:                Short  Lungs:             No accessory muscle use  . Heart:              RRR,  LE edema  Abdomen:        Soft, non-tender.  Obese    Skin:                bilateral feet scaly skin   neurologic:      Alert and oriented X 3, follows commands   Psych             normal affect                                Labs       Pertinent labs include:  Recent Labs     05/14/22  0152 05/13/22  0028   WBC 6.3 9.0   HGB 10.2* 10.7*   HCT 35.4 35.6   * 140*     Recent Labs     05/14/22  0152 05/13/22  0028    136   K 4.1 4.1   CL 99 95*   CO2 41* 41*    129*   BUN 13 13   CREA 0.39* 0.41*   CA 8.5 8.5     MRI PELV W WO CONT    Result Date: 5/4/2022  1. Redemonstrated left femoral neck displaced fracture with fluid in the fracture gap and edema/enhancement and surrounding marrow and soft tissues. Findings may reflect a healing fracture with possible superimposed osteomyelitis/osteonecrosis. 2.  Right sacral and bilateral parasymphyseal pubic insufficiency fractures. 3.  RIGHT femoral head and LEFT greater trochanter nondisplaced fractures. CT BX BONE DP NDL/TROCAR    Result Date: 5/5/2022  Successful CT-guided lavage and aspiration of fluid from space between fracture fragments of the left hip. CT ABD PELV W CONT    Result Date: 5/3/2022  1. Displaced left femoral neck fracture deformity with aggressive erosive changes and sclerosis along the fracture margin (851-35). Consider acute osteomyelitis and/or septic arthritis given the clinical presentation. 2.  Bilateral nonobstructing nephrolithiasis. 3.  Anusha mesentery, nonspecific finding which can be seen with enteritis etiologies (e.g., mesenteric panniculitis, enteritis, pancreatitis, etc.)     XR CHEST PORT    Result Date: 5/3/2022  Improved congestion and pulmonary edema with possible left pleural effusion. No results found. 04/21/22    ECHO ADULT COMPLETE 04/23/2022 4/23/2022    Interpretation Summary    Left Ventricle: Normal left ventricular systolic function with a visually estimated EF of 60 - 65%. Left ventricle size is normal. Normal wall thickness. Normal wall motion. Normal diastolic function.   Technical qualifiers: Echo study was technically difficult with poor endocardial visualization and technically difficult due to patient's body habitus.     Signed by: Enoch Thao MD on 4/23/2022  6:39 AM       Current Medications:     Current Facility-Administered Medications:     DAPTOmycin (CUBICIN) 1,000 mg in 0.9% sodium chloride 50 mL IVPB, 1,000 mg, IntraVENous, ONCE PRN, Tara Mae MD    gabapentin (NEURONTIN) capsule 100 mg, 100 mg, Oral, TID, Johan Jones MD, 100 mg at 05/13/22 2126    morphine injection 2 mg, 2 mg, IntraVENous, Q4H PRN, Michael Smith MD    vancomycin (VANCOCIN) 1,000 mg in 0.9% sodium chloride 250 mL (VIAL-MATE), 1,000 mg, IntraVENous, Q12H, Michael Smith MD, Last Rate: 250 mL/hr at 05/14/22 0154, 1,000 mg at 05/14/22 0154    cyclobenzaprine (FLEXERIL) tablet 10 mg, 10 mg, Oral, TID PRN, Michael Smith MD, 10 mg at 05/10/22 0914    oxyCODONE IR (ROXICODONE) tablet 5-10 mg, 5-10 mg, Oral, Q4H PRN, Michael Smith MD, 10 mg at 05/14/22 0422    albuterol-ipratropium (DUO-NEB) 2.5 MG-0.5 MG/3 ML, 3 mL, Nebulization, Q4H PRN, Michael Smith MD    amiodarone (CORDARONE) tablet 400 mg, 400 mg, Oral, DAILY, Lincoln Mckay NP, 400 mg at 05/13/22 1461    rivaroxaban (XARELTO) tablet 20 mg, 20 mg, Oral, DAILY, Lincoln Mckay NP, 20 mg at 05/13/22 0955    [COMPLETED] cefepime (MAXIPIME) 2 g in 0.9% sodium chloride 10 mL IV syringe, 2 g, IntraVENous, NOW, 2 g at 05/06/22 1405 **FOLLOWED BY** cefepime (MAXIPIME) 2 g in 0.9% sodium chloride (MBP/ADV) 100 mL MBP, 2 g, IntraVENous, Q8H, Tara Mae MD, Last Rate: 25 mL/hr at 05/14/22 0422, 2 g at 05/14/22 0422    L.acidophilus-paracasei-S.thermophil-bifidobacter (RISAQUAD) 8 billion cell capsule, 1 Capsule, Oral, DAILY, Tara Mae MD, 1 Capsule at 05/13/22 0955    balsam peru-castor oiL (VENELEX) ointment, , Topical, BID, Hien Blake MD, Given at 05/13/22 2127    sodium chloride (NS) flush 5-40 mL, 5-40 mL, IntraVENous, Q8H, Elder Choe MD, 10 mL at 05/14/22 0526    sodium chloride (NS) flush 5-40 mL, 5-40 mL, IntraVENous, PRN, Elder Choe MD    acetaminophen (TYLENOL) tablet 650 mg, 650 mg, Oral, Q6H PRN, 650 mg at 05/09/22 2252 **OR** acetaminophen (TYLENOL) suppository 650 mg, 650 mg, Rectal, Q6H PRN, Jennifer Villanueva MD    polyethylene glycol [facility]) packet 17 g, 17 g, Oral, DAILY, Luis Choe MD, 17 g at 05/11/22 0849    bisacodyL (DULCOLAX) tablet 5 mg, 5 mg, Oral, DAILY PRN, Luis Choe MD    promethazine (PHENERGAN) tablet 12.5 mg, 12.5 mg, Oral, Q6H PRN **OR** ondansetron (ZOFRAN) injection 4 mg, 4 mg, IntraVENous, Q6H PRN, Luis Choe MD    [Held by provider] atorvastatin (LIPITOR) tablet 10 mg, 10 mg, Oral, QHS, Luis Choe MD, 10 mg at 05/12/22 2010    metoprolol tartrate (LOPRESSOR) tablet 25 mg, 25 mg, Oral, BID, Luis Choe MD, 25 mg at 05/13/22 1827    [Held by provider] furosemide (LASIX) tablet 40 mg, 40 mg, Oral, DAILY, Luis Choe MD, 40 mg at 05/12/22 0843    miconazole (SECURA) 2 % extra thick cream, , Topical, BID, Cristiano Zavala MD, Given at 05/13/22 2127    ammonium lactate (LAC-HYDRIN) 12 % lotion, , Topical, BID, Cristiano Zavala MD, Given at 05/13/22 1831       ________________________________________________________________________  Rani Johns MD

## 2022-05-14 NOTE — PROGRESS NOTES
1900: Bedside shift change report given to [address] [facility] and Jose Prajapati RN (oncoming nurse) by Micheal Hogue (offgoing nurse). Report included the following information SBAR, Kardex, Intake/Output, MAR, Recent Results and Cardiac Rhythm NSR.     0422: PRN oxycodone given for pain    I have reviewed and agree with Jose Prajapati RN's charting and assessments. 0700: End of Shift Note    Bedside shift change report given to Micheal Hogue (oncoming nurse) by Valeriano Washington RN (offgoing nurse). Report included the following information SBAR, Kardex, Intake/Output, MAR, Recent Results and Cardiac Rhythm NSR    Shift worked:  [id number]     Shift summary and any significant changes:     PRN oxycodone given 1x     Concerns for physician to address:       Zone phone for oncoming shift:   [id number]       Activity:  Activity Level: Bed Rest  Number times ambulated in hallways past shift: 0  Number of times OOB to chair past shift: 0    Cardiac:   Cardiac Monitoring: Yes      Cardiac Rhythm: Sinus Rhythm    Access:   Current line(s): PIV and PICC     Genitourinary:   Urinary status: voiding and external catheter    Respiratory:   O2 Device: Nasal cannula  Chronic home O2 use?: YES  Incentive spirometer at bedside: YES       GI:  Last Bowel Movement Date: 05/12/22  Current diet:  ADULT DIET Easy to [address], Dinner; Low Calorie/High Protein  Passing flatus: YES  Tolerating current diet: YES       Pain Management:   Patient states pain is manageable on current regimen: YES    Skin:  Gaston Score: 13  Interventions: turn team, speciality bed and float heels    Patient Safety:  Fall Score:  Total Score: 2  Interventions: bed/chair alarm, assistive device (walker, cane, etc), gripper socks and pt to call before getting OOB       Length of Stay:  Expected LOS: 5d 21h  Actual LOS: 1901 Nino Au, RN

## 2022-05-14 NOTE — PROGRESS NOTES
0710 Bedside and Verbal shift change report given to Mj Blackmon / Chrissy Kang  (oncoming nurse) by Dedrick Yan RN (offgoing nurse). Report included the following information SBAR, Kardex, ED Summary, OR Summary, Procedure Summary, Intake/Output, MAR, Recent Results and Cardiac Rhythm NSR. End of Shift Note    Bedside shift change report given to MARIE Fritz (oncoming nurse) by Julio C Vasquez RN (offgoing nurse). Report included the following information SBAR, Kardex, ED Summary, Procedure Summary, Intake/Output, MAR, Recent Results and Cardiac Rhythm NSR    Shift worked:  7 am - 7 pm      Shift summary and any significant changes:     patient received scheduled antibiotics and is still waiting for placement d/c      Concerns for physician to address:  n/a     Zone phone for oncoming shift:          Activity:  Activity Level: Bed Rest  Number times ambulated in hallways past shift: 0  Number of times OOB to chair past shift: 0    Cardiac:   Cardiac Monitoring: Yes      Cardiac Rhythm: Sinus Rhythm    Access:   Current line(s): PIV and PICC     Genitourinary:   Urinary status: voiding and external catheter    Respiratory:   O2 Device: Nasal cannula  Chronic home O2 use?: YES  Incentive spirometer at bedside: YES       GI:  Last Bowel Movement Date: 05/12/22  Current diet:  ADULT DIET Easy to [address], Dinner; Low Calorie/High Protein  Passing flatus: YES  Tolerating current diet: YES       Pain Management:   Patient states pain is manageable on current regimen: YES    Skin:  Gaston Score: 13  Interventions: float heels, foam dressing, PT/OT consult, limit briefs, internal/external urinary devices and nutritional support     Patient Safety:  Fall Score:  Total Score: 2  Interventions: bed/chair alarm, assistive device (walker, cane, etc), gripper socks, pt to call before getting OOB and stay with me (per policy)       Length of Stay:  Expected LOS: 5d 21h  Actual LOS: Pt carried to ED room by mother. Agree with triage RN note. Mother reports pt has had cough x3 weeks and diagnosed with possible PNA yesterday. Pt has had 2 dose of prescribed abox. Pt has had 3 days of fevers. One occurrence of vomiting now resolved. Mother presenting today d/t pt loss of appetite and decreased fluid intake and urine output. Mother reports pt has drank approx 8 oz in past 48 hrs and has had 2 wet diaper in past 48 hrs. Mother denies diarrhea. Assessment as charted. Pt age appropriate, alert, interactive, and resting comfortably on cart in no acute distress. Pt placed on SpO2 monitor.  Mother at bedside. Call light within reach. Med student at bs. ERP to evaluate.   [id number] Светлана Galindo RN

## 2022-05-15 LAB
ANION GAP SERPL CALC-SCNC: ABNORMAL MMOL/L (ref 5–15)
BUN SERPL-MCNC: 14 MG/DL (ref 6–20)
BUN/CREAT SERPL: 37 (ref 12–20)
CALCIUM SERPL-MCNC: 8.5 MG/DL (ref 8.5–10.1)
CHLORIDE SERPL-SCNC: 100 MMOL/L (ref 97–108)
CO2 SERPL-SCNC: 40 MMOL/L (ref 21–32)
CREAT SERPL-MCNC: 0.38 MG/DL (ref 0.55–1.02)
FOLATE SERPL-MCNC: 2.4 NG/ML (ref 5–21)
GLUCOSE SERPL-MCNC: 94 MG/DL (ref 65–100)
POTASSIUM SERPL-SCNC: 4.5 MMOL/L (ref 3.5–5.1)
SODIUM SERPL-SCNC: 139 MMOL/L (ref 136–145)
VIT B12 SERPL-MCNC: 435 PG/ML (ref 193–986)

## 2022-05-15 PROCEDURE — 74011250637 HC RX REV CODE- 250/637: Performed by: STUDENT IN AN ORGANIZED HEALTH CARE EDUCATION/TRAINING PROGRAM

## 2022-05-15 PROCEDURE — 36415 COLL VENOUS BLD VENIPUNCTURE: CPT

## 2022-05-15 PROCEDURE — 74011250637 HC RX REV CODE- 250/637: Performed by: NURSE PRACTITIONER

## 2022-05-15 PROCEDURE — 74011250637 HC RX REV CODE- 250/637: Performed by: INTERNAL MEDICINE

## 2022-05-15 PROCEDURE — 74011000250 HC RX REV CODE- 250: Performed by: INTERNAL MEDICINE

## 2022-05-15 PROCEDURE — 74011250636 HC RX REV CODE- 250/636: Performed by: HOSPITALIST

## 2022-05-15 PROCEDURE — 82607 VITAMIN B-12: CPT

## 2022-05-15 PROCEDURE — 65270000046 HC RM TELEMETRY

## 2022-05-15 PROCEDURE — 74011000258 HC RX REV CODE- 258: Performed by: INTERNAL MEDICINE

## 2022-05-15 PROCEDURE — 74011250636 HC RX REV CODE- 250/636: Performed by: INTERNAL MEDICINE

## 2022-05-15 PROCEDURE — 80048 BASIC METABOLIC PNL TOTAL CA: CPT

## 2022-05-15 PROCEDURE — 82746 ASSAY OF FOLIC ACID SERUM: CPT

## 2022-05-15 PROCEDURE — 74011250637 HC RX REV CODE- 250/637: Performed by: HOSPITALIST

## 2022-05-15 RX ADMIN — SODIUM CHLORIDE, PRESERVATIVE FREE 10 ML: 5 INJECTION INTRAVENOUS at 13:20

## 2022-05-15 RX ADMIN — SODIUM CHLORIDE, PRESERVATIVE FREE 10 ML: 5 INJECTION INTRAVENOUS at 21:16

## 2022-05-15 RX ADMIN — GABAPENTIN 100 MG: 100 CAPSULE ORAL at 17:23

## 2022-05-15 RX ADMIN — GABAPENTIN 100 MG: 100 CAPSULE ORAL at 21:16

## 2022-05-15 RX ADMIN — CEFEPIME HYDROCHLORIDE 2 G: 2 INJECTION, POWDER, FOR SOLUTION INTRAVENOUS at 13:19

## 2022-05-15 RX ADMIN — SODIUM CHLORIDE, PRESERVATIVE FREE 10 ML: 5 INJECTION INTRAVENOUS at 21:20

## 2022-05-15 RX ADMIN — RIVAROXABAN 20 MG: 20 TABLET, FILM COATED ORAL at 09:52

## 2022-05-15 RX ADMIN — MICONAZOLE NITRATE: 20 CREAM TOPICAL at 21:00

## 2022-05-15 RX ADMIN — VANCOMYCIN HYDROCHLORIDE 1000 MG: 1 INJECTION, POWDER, LYOPHILIZED, FOR SOLUTION INTRAVENOUS at 15:35

## 2022-05-15 RX ADMIN — AMIODARONE HYDROCHLORIDE 400 MG: 200 TABLET ORAL at 09:52

## 2022-05-15 RX ADMIN — Medication: at 17:23

## 2022-05-15 RX ADMIN — MICONAZOLE NITRATE: 20 CREAM TOPICAL at 09:54

## 2022-05-15 RX ADMIN — CASTOR OIL AND BALSAM, PERU: 788; 87 OINTMENT TOPICAL at 21:16

## 2022-05-15 RX ADMIN — GABAPENTIN 100 MG: 100 CAPSULE ORAL at 09:52

## 2022-05-15 RX ADMIN — Medication: at 09:53

## 2022-05-15 RX ADMIN — VANCOMYCIN HYDROCHLORIDE 1000 MG: 1 INJECTION, POWDER, LYOPHILIZED, FOR SOLUTION INTRAVENOUS at 02:09

## 2022-05-15 RX ADMIN — OXYCODONE 10 MG: 5 TABLET ORAL at 11:38

## 2022-05-15 RX ADMIN — CEFEPIME HYDROCHLORIDE 2 G: 2 INJECTION, POWDER, FOR SOLUTION INTRAVENOUS at 04:48

## 2022-05-15 RX ADMIN — CEFEPIME HYDROCHLORIDE 2 G: 2 INJECTION, POWDER, FOR SOLUTION INTRAVENOUS at 21:16

## 2022-05-15 RX ADMIN — CASTOR OIL AND BALSAM, PERU: 788; 87 OINTMENT TOPICAL at 09:54

## 2022-05-15 RX ADMIN — Medication 1 CAPSULE: at 09:52

## 2022-05-15 RX ADMIN — METOPROLOL TARTRATE 25 MG: 25 TABLET, FILM COATED ORAL at 09:52

## 2022-05-15 NOTE — PROGRESS NOTES
Physician Progress Note      Balbir Hdz  CSN #:                  [id number]  :                       1960  ADMIT DATE:       5/3/2022 3:22 PM  [address] [place] DATE:  RESPONDING  PROVIDER #:        Raisa Monte MD          QUERY TEXT:    Dr Mellissa Joseph  Pt admitted with question left hip osteomyelitis. Pt noted to have LA 3.6, PCT 1.41, WBC 16. If possible, please document in the progress notes and discharge summary if you are evaluating and /or treating any of the following: The medical record reflects the following:  Risk Factors: presents with L hip fracture, rachele buttocks and sacral wounds, obesity, functional quadriplegia. Clinical Indicators: LA 3.6, PCT 1.41, WBC 16, resp rates 27-32,BP 95/43---91/61  Treatment: Vancomyicin, Amiodarone, Zosyn, 500cc bolus; [email or web address], Levaquin,  Options provided:  -- Sepsis, present on admission  -- Sepsis, present on admission now resolved  -- Question left hip osteomyelitis without Sepsis  -- Sepsis was ruled out  -- Other - I will add my own diagnosis  -- Disagree - Not applicable / Not valid  -- Disagree - Clinically unable to determine / Unknown  -- Refer to Clinical Documentation Reviewer    PROVIDER RESPONSE TEXT:    This patient has Question left hip osteomyelitis without Sepsis. Query created by: Mari Woodard on 2022 12:03 PM      QUERY TEXT:    Dr Mellissa Joseph  Pt admitted with questionable osteomyelitis of hip. Pt noted to have xray with pulmonary edema and left pleural effusion. If possible, please document in the progress notes and discharge summary if you are evaluating and/or treating any of the following: The medical record reflects the following:  Risk Factors: presents with possible osteomyelitis, sacral wounds, functional quadriplegia  Clinical Indicators: 5/3 XRAY; Improved congestion and pulmonary edema with possible left pleural  effusion.   Treatment: Lasix po Maxipime, Lopressor bid; Vancomyicin, Amiodarone, Zosyn, kcl; 500cc bolus; [email or web address], Anisha,  Options provided:  -- Acute pulmonary edema due to heart disease  -- Acute pulmonary edema due to heart failure  -- Chronic pulmonary edema due to heart disease  -- Chronic pulmonary edema due to heart failure  -- Noncardiogenic acute pulmonary edema due to ***  -- Other - I will add my own diagnosis  -- Disagree - Not applicable / Not valid  -- Disagree - Clinically unable to determine / Unknown  -- Refer to Clinical Documentation Reviewer    PROVIDER RESPONSE TEXT:    This patient has chronic pulmonary edema due to heart disease. Query created by:  Brian Walker on 5/13/2022 12:07 PM      Electronically signed by:  Karin Collazo MD 5/15/2022 12:22 PM

## 2022-05-15 NOTE — PROGRESS NOTES
Hospitalist Progress Note    NAME: Sally Draper   :  1960   MRN:  [id number]       Assessment / Plan:    Left hip fracture displaced POA age undetermined  ? left hip osteomyelitis POA  Bilateral buttock and sacral wounds POA  -She  reports trauma years ago, denies any recently  -She is very vague in terms of how long this has been going on  -Found on CT scan done in the emergency room rule out osteomyelitis  -Original reason sent to the ED was because of worsening buttock/sacral wounds  -MRI was done which is more suggestive of fracture and osteomyelitis  -[facility] evaluated no surgical intervention planned  -As needed oxycodone and morphine  -wound care on case  -S/p  Aspiration?/Bx by IR, culture NGTD. Pathology results ? Pending  -ID on board. On vancomycin and cefepime currently. ID recommends daptomycin and cefepime on discharge. End date . No statin while on daptomycin. ID follow-up on   -Complaints of bilateral lower extremity pain. Has some numbness. Started on low-dose gabapentin  -Remains medically stable for discharge. Awaiting SNF placement. Diarrhea at home-resolved     Possible UTI POA-   NGTD on URx, already got Abx     Chronic hypoxic respiratory failure/DHF   Baseline on 3 to 4 L NC O2    COPD   stable on 3-4LNC, her baseline requirement.    Recent admission  to 2022 with shortness of breath and hypoxia  Treated with a course of steroids, doxycycline, diuresis  Discharged home at baseline  Holding Lasix due to contraction alkalosis. Could also be from met compensation for respiratory acidosis. Follow BMP. Currently off prednisone  Continue nebs and oxygen      Paroxysmal A. Fib  Was not on  AC as nosebleeds with aspirin.    Multiple prior cardioversion procedures  Continue metoprolol/amio as per cards  On xarelto per cards.  Tolerating so far  Currently in normal sinus rhythm     Steroid induced hyperglycemia  Glycemic last admit on steroids   A1c level 5.4 on 4/21  BG wnl for most part       HTN controlled.  Cont' PTA meds.      Morbid obesity/Impaired mobility/functional quadriplegia  Does not ambulate for over 2 years  With her daughter, having increasing difficulty caring for the patient        DVT prophylaxis with xarelto     Code status:full code  NOK: Daughter  Recommended Disposition:SNF  Anticipated Discharge Date: Medically stable for discharge     Subjective: Follow-up visit for hip pain   Patient seen and examined, chart was reviewed. States she does not feel too well but unable to specify further. Denies pain anywhere. Intermittent shortness of breath. No chest pain. \" I just want to sleep\"    Objective:     VITALS:   Last 24hrs VS reviewed since prior progress note. Most recent are:  Patient Vitals for the past 24 hrs:   Temp Pulse Resp BP SpO2   05/15/22 1141 98.4 °F (36.9 °C) 80 22 (!) 126/52 96 %   05/15/22 0737 98.4 °F (36.9 °C) 81 26 (!) 128/41 98 %   05/14/22 2357 98.6 °F (37 °C) 80 19 (!) 124/99 99 %   05/14/22 2312 98.8 °F (37.1 °C) 77 27 125/65 100 %   05/14/22 1938 99 °F (37.2 °C) 77 15 (!) 138/57 97 %   05/14/22 1510 98.5 °F (36.9 °C) 78 26 (!) 124/59 97 %       Intake/Output Summary (Last 24 hours) at 5/15/2022 1220  Last data filed at 5/14/2022 2114  Gross per 24 hour   Intake 550 ml   Output 350 ml   Net 200 ml        I had a face to face encounter, and independently examined this patient on 5/15/2022, as outlined below:    PHYSICAL EXAM:  General:           Chronic ill looking  HEENT:           Normocephalic                  Neck:                Short  Lungs:             No accessory muscle use  . Heart:              RRR,  LE edema  Abdomen:        Soft, non-tender.  Obese    Skin:                bilateral feet scaly skin   neurologic:      Alert and oriented X 3, follows commands   Psych             normal affect Labs       Pertinent labs include:  Recent Labs     05/14/22  0152 05/13/22  0028   WBC 6.3 9.0   HGB 10.2* 10.7*   HCT 35.4 35.6   * 140*     Recent Labs     05/15/22  0437 05/14/22  0152 05/13/22  0028    138 136   K 4.5 4.1 4.1    99 95*   CO2 40* 41* 41*   GLU 94 100 129*   BUN 14 13 13   CREA 0.38* 0.39* 0.41*   CA 8.5 8.5 8.5     MRI PELV W WO CONT    Result Date: 5/4/2022  1. Redemonstrated left femoral neck displaced fracture with fluid in the fracture gap and edema/enhancement and surrounding marrow and soft tissues. Findings may reflect a healing fracture with possible superimposed osteomyelitis/osteonecrosis. 2.  Right sacral and bilateral parasymphyseal pubic insufficiency fractures. 3.  RIGHT femoral head and LEFT greater trochanter nondisplaced fractures. CT BX BONE DP NDL/TROCAR    Result Date: 5/5/2022  Successful CT-guided lavage and aspiration of fluid from space between fracture fragments of the left hip. CT ABD PELV W CONT    Result Date: 5/3/2022  1. Displaced left femoral neck fracture deformity with aggressive erosive changes and sclerosis along the fracture margin (222-12). Consider acute osteomyelitis and/or septic arthritis given the clinical presentation. 2.  Bilateral nonobstructing nephrolithiasis. 3.  Anusha mesentery, nonspecific finding which can be seen with enteritis etiologies (e.g., mesenteric panniculitis, enteritis, pancreatitis, etc.)     XR CHEST PORT    Result Date: 5/3/2022  Improved congestion and pulmonary edema with possible left pleural effusion. No results found. 04/21/22    ECHO ADULT COMPLETE 04/23/2022 4/23/2022    Interpretation Summary    Left Ventricle: Normal left ventricular systolic function with a visually estimated EF of 60 - 65%. Left ventricle size is normal. Normal wall thickness. Normal wall motion. Normal diastolic function.     Technical qualifiers: Echo study was technically difficult with poor endocardial visualization and technically difficult due to patient's body habitus.     Signed by: Tee Sanchez MD on 4/23/2022  6:39 AM       Current Medications:     Current Facility-Administered Medications:     gabapentin (NEURONTIN) capsule 100 mg, 100 mg, Oral, TID, Johan Jones MD, 100 mg at 05/15/22 0952    vancomycin (VANCOCIN) 1,000 mg in 0.9% sodium chloride 250 mL (VIAL-MATE), 1,000 mg, IntraVENous, Q12H, SarahMichael lui MD, Last Rate: 250 mL/hr at 05/15/22 0209, 1,000 mg at 05/15/22 0209    cyclobenzaprine (FLEXERIL) tablet 10 mg, 10 mg, Oral, TID PRN, Michael Smith MD, 10 mg at 05/10/22 0914    oxyCODONE IR (ROXICODONE) tablet 5-10 mg, 5-10 mg, Oral, Q4H PRN, Michael Smith MD, 10 mg at 05/15/22 1138    albuterol-ipratropium (DUO-NEB) 2.5 MG-0.5 MG/3 ML, 3 mL, Nebulization, Q4H PRN, Michael Smith MD    amiodarone (CORDARONE) tablet 400 mg, 400 mg, Oral, DAILY, Dc Garay NP, 400 mg at 05/15/22 5886    rivaroxaban (XARELTO) tablet 20 mg, 20 mg, Oral, DAILY, Dc Garay NP, 20 mg at 05/15/22 0952    [COMPLETED] cefepime (MAXIPIME) 2 g in 0.9% sodium chloride 10 mL IV syringe, 2 g, IntraVENous, NOW, 2 g at 05/06/22 1405 **FOLLOWED BY** cefepime (MAXIPIME) 2 g in 0.9% sodium chloride (MBP/ADV) 100 mL MBP, 2 g, IntraVENous, Q8H, Bronwyn Mae MD, Last Rate: 25 mL/hr at 05/15/22 0448, 2 g at 05/15/22 0448    L.acidophilus-paracasei-S.thermophil-bifidobacter (RISAQUAD) 8 billion cell capsule, 1 Capsule, Oral, DAILY, Bronwyn Mae MD, 1 Capsule at 05/15/22 5000    balsam peru-castor oiL (VENELEX) ointment, , Topical, BID, Sejal Blake MD, Given at 05/15/22 0954    sodium chloride (NS) flush 5-40 mL, 5-40 mL, IntraVENous, Q8H, Derrell Choe MD, 10 mL at 05/14/22 2118    sodium chloride (NS) flush 5-40 mL, 5-40 mL, IntraVENous, PRN, Derrell Choe MD    acetaminophen (TYLENOL) tablet 650 mg, 650 mg, Oral, Q6H PRN, 650 mg at 05/09/22 2252 **OR** acetaminophen (TYLENOL) suppository 650 mg, 650 mg, Rectal, Q6H PRN, Jerald Choe MD    polyethylene glycol (MIRALAX) packet 17 g, 17 g, Oral, DAILY, Jerald Choe MD, 17 g at 05/11/22 0849    bisacodyL (DULCOLAX) tablet 5 mg, 5 mg, Oral, DAILY PRN, Jerald Choe MD    promethazine (PHENERGAN) tablet 12.5 mg, 12.5 mg, Oral, Q6H PRN **OR** ondansetron (ZOFRAN) injection 4 mg, 4 mg, IntraVENous, Q6H PRN, Jerald Choe MD    [Held by provider] atorvastatin (LIPITOR) tablet 10 mg, 10 mg, Oral, QHS, Jerald Choe MD, 10 mg at 05/12/22 2010    metoprolol tartrate (LOPRESSOR) tablet 25 mg, 25 mg, Oral, BID, Jerald Choe MD, 25 mg at 05/15/22 3600    [Held by provider] furosemide (LASIX) tablet 40 mg, 40 mg, Oral, DAILY, Jerald Choe MD, 40 mg at 05/12/22 0843    miconazole (SECURA) 2 % extra thick cream, , Topical, BID, Carmen Davis MD, Given at 05/15/22 0954    ammonium lactate (LAC-HYDRIN) 12 % lotion, , Topical, BID, Carmen Davis MD, Given at 05/15/22 1096       ________________________________________________________________________  Yimi Moncada MD

## 2022-05-15 NOTE — PROGRESS NOTES
1900: Bedside shift change report given to [facility] and Jose Prajapati RN (oncoming nurse) by Micheal Hogue and Thu Mascorro RN (offgoing nurse). Report included the following information SBAR, Kardex, Intake/Output, MAR, Recent Results and Cardiac Rhythm NSR. I have reviewed and agree with Jose Prajapati Rn's assessment and charting. 2300: TRANSFER - OUT REPORT:    Verbal report given to Amorjuliet Kierra (name) elizabeth Hendricks  being transferred to  [facility] (unit) for routine progression of care       Report consisted of patients Situation, Background, Assessment and   Recommendations(SBAR). Information from the following report(s) SBAR, Kardex, Intake/Output, MAR, Recent Results and Cardiac Rhythm NSR was reviewed with the receiving nurse. Lines:   PICC Single Lumen 55/57/42 Left;Basilic (Active)   Central Line Being Utilized Yes 05/14/22 2347   Criteria for Appropriate Use Long term IV/antibiotic administration 05/14/22 2347   Site Assessment Clean, dry, & intact 05/14/22 2347   Phlebitis Assessment 0 05/14/22 2347   Infiltration Assessment 0 05/14/22 2347   Arm Circumference (cm) 42 cm 05/12/22 1314   Date of Last Dressing Change 05/12/22 05/14/22 1938   Dressing Status Clean, dry, & intact 05/14/22 2347   External Catheter Length (cm) 0 centimeters 05/14/22 2347   Dressing Type Disk with Chlorhexadine gluconate (CHG); Transparent 05/14/22 2347   Hub Color/Line Status Purple; Infusing 05/14/22 2347   Action Taken Open ports on tubing capped 05/14/22 2347   Positive Blood Return (Site #1) Yes 05/14/22 2347   Alcohol Cap Used Yes 05/14/22 2347       Peripheral IV 05/11/22 Right Forearm (Active)   Site Assessment Clean, dry, & intact 05/14/22 2347   Phlebitis Assessment 0 05/14/22 2347   Infiltration Assessment 0 05/14/22 2347   Dressing Status Clean, dry, & intact 05/14/22 2347   Dressing Type Transparent;Tape 05/14/22 2347   Hub Color/Line Status Pink;Capped 05/14/22 2347   Action Taken Open ports on tubing capped 05/14/22 2347   Alcohol Cap Used Yes 05/14/22 2347        Opportunity for questions and clarification was provided.       Patient transported with:   Monitor  O2 @ 3 liters  Registered Nurse

## 2022-05-16 LAB
ANION GAP SERPL CALC-SCNC: 0 MMOL/L (ref 5–15)
BUN SERPL-MCNC: 13 MG/DL (ref 6–20)
BUN/CREAT SERPL: 37 (ref 12–20)
CALCIUM SERPL-MCNC: 8.7 MG/DL (ref 8.5–10.1)
CHLORIDE SERPL-SCNC: 99 MMOL/L (ref 97–108)
CO2 SERPL-SCNC: 41 MMOL/L (ref 21–32)
CREAT SERPL-MCNC: 0.35 MG/DL (ref 0.55–1.02)
GLUCOSE SERPL-MCNC: 106 MG/DL (ref 65–100)
POTASSIUM SERPL-SCNC: 4.6 MMOL/L (ref 3.5–5.1)
SODIUM SERPL-SCNC: 140 MMOL/L (ref 136–145)
VANCOMYCIN SERPL-MCNC: 18.2 UG/ML

## 2022-05-16 PROCEDURE — 80202 ASSAY OF VANCOMYCIN: CPT

## 2022-05-16 PROCEDURE — 74011250637 HC RX REV CODE- 250/637: Performed by: INTERNAL MEDICINE

## 2022-05-16 PROCEDURE — 80048 BASIC METABOLIC PNL TOTAL CA: CPT

## 2022-05-16 PROCEDURE — 74011250637 HC RX REV CODE- 250/637: Performed by: HOSPITALIST

## 2022-05-16 PROCEDURE — 65270000046 HC RM TELEMETRY

## 2022-05-16 PROCEDURE — 94760 N-INVAS EAR/PLS OXIMETRY 1: CPT

## 2022-05-16 PROCEDURE — 36415 COLL VENOUS BLD VENIPUNCTURE: CPT

## 2022-05-16 PROCEDURE — 74011250636 HC RX REV CODE- 250/636: Performed by: HOSPITALIST

## 2022-05-16 PROCEDURE — 74011250637 HC RX REV CODE- 250/637: Performed by: NURSE PRACTITIONER

## 2022-05-16 PROCEDURE — 74011000250 HC RX REV CODE- 250: Performed by: INTERNAL MEDICINE

## 2022-05-16 PROCEDURE — 74011000258 HC RX REV CODE- 258: Performed by: INTERNAL MEDICINE

## 2022-05-16 PROCEDURE — 74011250636 HC RX REV CODE- 250/636: Performed by: INTERNAL MEDICINE

## 2022-05-16 PROCEDURE — 74011250637 HC RX REV CODE- 250/637: Performed by: STUDENT IN AN ORGANIZED HEALTH CARE EDUCATION/TRAINING PROGRAM

## 2022-05-16 RX ORDER — FOLIC ACID 1 MG/1
1 TABLET ORAL DAILY
Status: DISCONTINUED | OUTPATIENT
Start: 2022-05-16 | End: 2022-05-17 | Stop reason: HOSPADM

## 2022-05-16 RX ADMIN — AMIODARONE HYDROCHLORIDE 400 MG: 200 TABLET ORAL at 09:04

## 2022-05-16 RX ADMIN — GABAPENTIN 100 MG: 100 CAPSULE ORAL at 21:10

## 2022-05-16 RX ADMIN — Medication: at 09:07

## 2022-05-16 RX ADMIN — OXYCODONE 10 MG: 5 TABLET ORAL at 15:37

## 2022-05-16 RX ADMIN — GABAPENTIN 100 MG: 100 CAPSULE ORAL at 09:05

## 2022-05-16 RX ADMIN — SODIUM CHLORIDE, PRESERVATIVE FREE 10 ML: 5 INJECTION INTRAVENOUS at 15:24

## 2022-05-16 RX ADMIN — METOPROLOL TARTRATE 25 MG: 25 TABLET, FILM COATED ORAL at 18:21

## 2022-05-16 RX ADMIN — SODIUM CHLORIDE, PRESERVATIVE FREE 10 ML: 5 INJECTION INTRAVENOUS at 06:40

## 2022-05-16 RX ADMIN — Medication: at 18:22

## 2022-05-16 RX ADMIN — CASTOR OIL AND BALSAM, PERU: 788; 87 OINTMENT TOPICAL at 21:00

## 2022-05-16 RX ADMIN — CEFEPIME HYDROCHLORIDE 2 G: 2 INJECTION, POWDER, FOR SOLUTION INTRAVENOUS at 06:40

## 2022-05-16 RX ADMIN — RIVAROXABAN 20 MG: 20 TABLET, FILM COATED ORAL at 09:04

## 2022-05-16 RX ADMIN — SODIUM CHLORIDE, PRESERVATIVE FREE 10 ML: 5 INJECTION INTRAVENOUS at 21:11

## 2022-05-16 RX ADMIN — MICONAZOLE NITRATE: 20 CREAM TOPICAL at 20:17

## 2022-05-16 RX ADMIN — OXYCODONE 10 MG: 5 TABLET ORAL at 20:11

## 2022-05-16 RX ADMIN — POLYETHYLENE GLYCOL 3350 17 G: 17 POWDER, FOR SOLUTION ORAL at 09:04

## 2022-05-16 RX ADMIN — VANCOMYCIN HYDROCHLORIDE 1000 MG: 1 INJECTION, POWDER, LYOPHILIZED, FOR SOLUTION INTRAVENOUS at 02:55

## 2022-05-16 RX ADMIN — METOPROLOL TARTRATE 25 MG: 25 TABLET, FILM COATED ORAL at 09:04

## 2022-05-16 RX ADMIN — CASTOR OIL AND BALSAM, PERU: 788; 87 OINTMENT TOPICAL at 09:08

## 2022-05-16 RX ADMIN — CYCLOBENZAPRINE 10 MG: 10 TABLET, FILM COATED ORAL at 20:11

## 2022-05-16 RX ADMIN — GABAPENTIN 100 MG: 100 CAPSULE ORAL at 15:37

## 2022-05-16 RX ADMIN — Medication 1 CAPSULE: at 09:04

## 2022-05-16 RX ADMIN — VANCOMYCIN HYDROCHLORIDE 1000 MG: 1 INJECTION, POWDER, LYOPHILIZED, FOR SOLUTION INTRAVENOUS at 15:18

## 2022-05-16 RX ADMIN — CEFEPIME HYDROCHLORIDE 2 G: 2 INJECTION, POWDER, FOR SOLUTION INTRAVENOUS at 16:47

## 2022-05-16 RX ADMIN — CEFEPIME HYDROCHLORIDE 2 G: 2 INJECTION, POWDER, FOR SOLUTION INTRAVENOUS at 20:13

## 2022-05-16 RX ADMIN — FUROSEMIDE 40 MG: 40 TABLET ORAL at 10:30

## 2022-05-16 RX ADMIN — MICONAZOLE NITRATE: 20 CREAM TOPICAL at 09:10

## 2022-05-16 RX ADMIN — FOLIC ACID 1 MG: 1 TABLET ORAL at 15:18

## 2022-05-16 NOTE — PROGRESS NOTES
End of Shift Note    Bedside shift change report given to MARIE Toscano (oncoming nurse) by Dileep Munoz (offgoing nurse). Report included the following information SBAR, Kardex, Intake/Output and MAR    Shift worked:  7am-7pm     Shift summary and any significant changes:     Pt did not leave the bed during the shift. Pt is tolerating the current diet. Pt complained of pain during the shift and oxycodone was given. LPN performed wound care on the pt. PT reeducated on turning ever 2 hurs    Pt remained normal sinus on the monitor. Pt had an uneventful shift. Concerns for physician to address: none     Zone phone for oncoming shift:  None      Activity:  Activity Level: Bed Rest  Number times ambulated in hallways past shift: 0  Number of times OOB to chair past shift: 0    Cardiac:   Cardiac Monitoring: Yes      Cardiac Rhythm: Sinus Rhythm    Access:   Current line(s): PICC     Genitourinary:   Urinary status: external catheter    Respiratory:   O2 Device: Nasal cannula  Chronic home O2 use?: NO  Incentive spirometer at bedside: N/A       GI:  Last Bowel Movement Date: 05/12/22  Current diet:  ADULT DIET Easy to Chew  ADULT ORAL NUTRITION SUPPLEMENT Breakfast, Dinner; Low Calorie/High Protein  Passing flatus: YES  Tolerating current diet: YES       Pain Management:   Patient states pain is manageable on current regimen: YES    Skin:  Gaston Score: 13  Interventions: turn team, speciality bed, float heels, increase time out of bed, foam dressing and internal/external urinary devices    Patient Safety:  Fall Score:  Total Score: 2  Interventions: bed/chair alarm, assistive device (walker, cane, etc), gripper socks and pt to call before getting OOB       Length of Stay:  Expected LOS: 5d 21h  Actual LOS:   [address]

## 2022-05-16 NOTE — PROGRESS NOTES
Hospitalist Progress Note    NAME: Urbano Felder   :  1960   MRN:  [id number]       Assessment / Plan:    Left hip fracture displaced POA age undetermined  ? left hip osteomyelitis POA  Bilateral buttock and sacral wounds POA  -She  reports trauma years ago, denies any recently  -She is very vague in terms of how long this has been going on  -Found on CT scan done in the emergency room rule out osteomyelitis  -Original reason sent to the ED was because of worsening buttock/sacral wounds  -MRI was done which is more suggestive of fracture and osteomyelitis  -[facility] evaluated no surgical intervention planned  -As needed oxycodone and morphine  -wound care on case  -S/p  Aspiration?/Bx by IR, culture negative. Pathology results ? Pending  -ID on board. On vancomycin and cefepime currently. ID recommends daptomycin and cefepime on discharge. End date . No statin while on daptomycin. ID follow-up on . Pharmacy consulted to give a dose of daptomycin prior to discharge. Patient for discharge tomorrow  -Complaints of bilateral lower extremity pain. Has some numbness. Started on low-dose gabapentin  -Remains medically stable for discharge. Awaiting placement. Likely tmrw    Diarrhea at home-resolved     Possible UTI POA-   NGTD on URx, already got Abx    Macrocytic anemia  -Folate level low. B12 wnl  -We will start folic acid supplement    Chronic hypoxic respiratory failure/DHF   Baseline on 3 to 4 L NC O2    COPD   stable on 3-4LNC, her baseline requirement.    Recent admission  to 2022 with shortness of breath and hypoxia  Treated with a course of steroids, doxycycline, diuresis  Discharged home at baseline  Resume Lasix. Increased serum bicarb likely metabolic compensation for respiratory acidosis. Follow BMP  Continue nebs and oxygen      Paroxysmal A.  Fib  Was not on AC as nosebleeds with aspirin.    Multiple prior cardioversion procedures  Continue metoprolol/amio as per cards  On xarelto per cards. Tolerating so far  Currently in normal sinus rhythm     Steroid induced hyperglycemia  Glycemic last admit on steroids   A1c level 5.4 on 4/21  BG wnl for most part       HTN controlled.  Cont' PTA meds.      Morbid obesity/Impaired mobility/functional quadriplegia  Does not ambulate for over 2 years  With her daughter, having increasing difficulty caring for the patient        DVT prophylaxis with xarelto     Code status:full code  NOK: Daughter  Recommended Disposition:SNF  Anticipated Discharge Date: Medically stable for discharge     Subjective: Follow-up visit for hip pain   Patient seen and examined, chart was reviewed. No acute events overnight  \"I feel fine\"    Objective:     VITALS:   Last 24hrs VS reviewed since prior progress note. Most recent are:  Patient Vitals for the past 24 hrs:   Temp Pulse Resp BP SpO2   05/16/22 1133 97.9 °F (36.6 °C) 74 18 (!) 131/56 95 %   05/16/22 0756 97.8 °F (36.6 °C) 85 18 (!) 163/56 100 %   05/16/22 0444 98.2 °F (36.8 °C) 79 18 (!) 154/59 98 %   05/15/22 2352 98 °F (36.7 °C) 81 19 (!) 144/54 96 %   05/15/22 2003 98.3 °F (36.8 °C) 80 20 130/64 98 %   05/15/22 1718 98.5 °F (36.9 °C) 77 20 104/77 98 %       Intake/Output Summary (Last 24 hours) at 5/16/2022 1407  Last data filed at 5/16/2022 0756  Gross per 24 hour   Intake 1390 ml   Output 1750 ml   Net -360 ml        I had a face to face encounter, and independently examined this patient on 5/16/2022, as outlined below:    PHYSICAL EXAM:  General:           Chronic ill looking  HEENT:           Normocephalic                  Neck:                Short  Lungs:             No accessory muscle use  . Heart:              RRR,  LE edema  Abdomen:        Soft, non-tender.  Obese    Skin:                bilateral feet scaly skin   neurologic:      Alert and oriented X 3, follows commands   Psych normal affect                                Labs       Pertinent labs include:  Recent Labs     05/14/22  0152   WBC 6.3   HGB 10.2*   HCT 35.4   *     Recent Labs     05/16/22  0248 05/15/22  0437 05/14/22  0152    139 138   K 4.6 4.5 4.1   CL 99 100 99   CO2 41* 40* 41*   * 94 100   BUN 13 14 13   CREA 0.35* 0.38* 0.39*   CA 8.7 8.5 8.5     MRI PELV W WO CONT    Result Date: 5/4/2022  1. Redemonstrated left femoral neck displaced fracture with fluid in the fracture gap and edema/enhancement and surrounding marrow and soft tissues. Findings may reflect a healing fracture with possible superimposed osteomyelitis/osteonecrosis. 2.  Right sacral and bilateral parasymphyseal pubic insufficiency fractures. 3.  RIGHT femoral head and LEFT greater trochanter nondisplaced fractures. CT BX BONE DP NDL/TROCAR    Result Date: 5/5/2022  Successful CT-guided lavage and aspiration of fluid from space between fracture fragments of the left hip. CT ABD PELV W CONT    Result Date: 5/3/2022  1. Displaced left femoral neck fracture deformity with aggressive erosive changes and sclerosis along the fracture margin (070-20). Consider acute osteomyelitis and/or septic arthritis given the clinical presentation. 2.  Bilateral nonobstructing nephrolithiasis. 3.  Anusha mesentery, nonspecific finding which can be seen with enteritis etiologies (e.g., mesenteric panniculitis, enteritis, pancreatitis, etc.)     XR CHEST PORT    Result Date: 5/3/2022  Improved congestion and pulmonary edema with possible left pleural effusion. No results found. 04/21/22    ECHO ADULT COMPLETE 04/23/2022 4/23/2022    Interpretation Summary    Left Ventricle: Normal left ventricular systolic function with a visually estimated EF of 60 - 65%. Left ventricle size is normal. Normal wall thickness. Normal wall motion. Normal diastolic function.     Technical qualifiers: Echo study was technically difficult with poor endocardial visualization and technically difficult due to patient's body habitus.     Signed by: Britt Ann MD on 4/23/2022  6:39 AM       Current Medications:     Current Facility-Administered Medications:     gabapentin (NEURONTIN) capsule 100 mg, 100 mg, Oral, TID, Johan Jones MD, 100 mg at 05/16/22 0905    vancomycin (VANCOCIN) 1,000 mg in 0.9% sodium chloride 250 mL (VIAL-MATE), 1,000 mg, IntraVENous, Q12H, Michael Smith MD, Last Rate: 250 mL/hr at 05/16/22 0255, 1,000 mg at 05/16/22 0255    cyclobenzaprine (FLEXERIL) tablet 10 mg, 10 mg, Oral, TID PRN, Michael Smith MD, 10 mg at 05/10/22 0914    oxyCODONE IR (ROXICODONE) tablet 5-10 mg, 5-10 mg, Oral, Q4H PRN, Michael Smith MD, 10 mg at 05/15/22 1138    albuterol-ipratropium (DUO-NEB) 2.5 MG-0.5 MG/3 ML, 3 mL, Nebulization, Q4H PRN, Michael Smith MD    amiodarone (CORDARONE) tablet 400 mg, 400 mg, Oral, DAILY, Carter Peralta NP, 400 mg at 05/16/22 3832    rivaroxaban (XARELTO) tablet 20 mg, 20 mg, Oral, DAILY, Carter Peralta NP, 20 mg at 05/16/22 0904    [COMPLETED] cefepime (MAXIPIME) 2 g in 0.9% sodium chloride 10 mL IV syringe, 2 g, IntraVENous, NOW, 2 g at 05/06/22 1405 **FOLLOWED BY** cefepime (MAXIPIME) 2 g in 0.9% sodium chloride (MBP/ADV) 100 mL MBP, 2 g, IntraVENous, Q8H, Mario Mae MD, Last Rate: 25 mL/hr at 05/16/22 0640, 2 g at 05/16/22 0640    L.acidophilus-paracasei-S.thermophil-bifidobacter (RISAQUAD) 8 billion cell capsule, 1 Capsule, Oral, DAILY, Mario Mae MD, 1 Capsule at 05/16/22 0904    balsam peru-castor oiL (VENELEX) ointment, , Topical, BID, Avtar Blake MD, Given at 05/16/22 0908    sodium chloride (NS) flush 5-40 mL, 5-40 mL, IntraVENous, Q8H, Vicenta Choe MD, 10 mL at 05/16/22 0640    sodium chloride (NS) flush 5-40 mL, 5-40 mL, IntraVENous, PRN, Vicenta Choe MD    acetaminophen (TYLENOL) tablet 650 mg, 650 mg, Oral, Q6H PRN, 650 mg at 05/09/22 7786 **OR** acetaminophen (TYLENOL) suppository 650 mg, 650 mg, Rectal, Q6H PRN, Vicenta Choe MD    polyethylene glycol (MIRALAX) packet 17 g, 17 g, Oral, DAILY, Vicenta Choe MD, 17 g at 05/16/22 3550    bisacodyL (DULCOLAX) tablet 5 mg, 5 mg, Oral, DAILY PRN, Vicenta Choe MD    promethazine (PHENERGAN) tablet 12.5 mg, 12.5 mg, Oral, Q6H PRN **OR** ondansetron (ZOFRAN) injection 4 mg, 4 mg, IntraVENous, Q6H PRN, Vicenta Choe MD    [Held by provider] atorvastatin (LIPITOR) tablet 10 mg, 10 mg, Oral, QHS, Vicenta Choe MD, 10 mg at 05/12/22 2010    metoprolol tartrate (LOPRESSOR) tablet 25 mg, 25 mg, Oral, BID, Vicenta Choe MD, 25 mg at 05/16/22 4544    furosemide (LASIX) tablet 40 mg, 40 mg, Oral, DAILY, Vicenta Choe MD, 40 mg at 05/16/22 1030    miconazole (SECURA) 2 % extra thick cream, , Topical, BID, Abdelrahman Mujica MD, Given at 05/16/22 0910    ammonium lactate (LAC-HYDRIN) 12 % lotion, , Topical, BID, Abdelrahman Mujica MD, Given at 05/16/22 1867       ________________________________________________________________________  Bishnu Ramos MD

## 2022-05-16 NOTE — PROGRESS NOTES
End of Shift Note    Bedside shift change report given to Damian Quiñones RN (oncoming nurse) by Ilah Frankel, RN (offgoing nurse). Report included the following information SBAR, Kardex, Intake/Output and MAR    Shift worked:  7am-7pm     Shift summary and any significant changes:     Pt did not leave the bed during the shift. Pt is tolerating the current diet. Pt complained of pain during the shift and oxycodone was given. This seemed to slightly alleviate the pain. Rn performed partial wound care on the pt. PT refused to be turned enough to perform wound care. Pt remained normal sinus on the monitor. RN obtained labs from the pt. Pt had an uneventful shift. Concerns for physician to address:       Zone phone for oncoming shift:   [phone]       Activity:  Activity Level: Bed Rest  Number times ambulated in hallways past shift: 0  Number of times OOB to chair past shift: 0    Cardiac:   Cardiac Monitoring: Yes      Cardiac Rhythm: Sinus Rhythm    Access:   Current line(s): PICC     Genitourinary:   Urinary status: external catheter    Respiratory:   O2 Device: Nasal cannula  Chronic home O2 use?: NO  Incentive spirometer at bedside: N/A       GI:  Last Bowel Movement Date: 05/12/22  Current diet:  ADULT DIET Easy to [place] [address] Breakfast, Dinner; Low Calorie/High Protein  Passing flatus: YES  Tolerating current diet: YES       Pain Management:   Patient states pain is manageable on current regimen: YES    Skin:  Gaston Score: 13  Interventions: turn team, speciality bed, float heels, increase time out of bed, foam dressing and internal/external urinary devices    Patient Safety:  Fall Score:  Total Score: 2  Interventions: bed/chair alarm, assistive device (walker, cane, etc), gripper socks and pt to call before getting OOB       Length of Stay:  Expected LOS: 5d 21h  Actual LOS: 1451 Casey Dalal RN

## 2022-05-16 NOTE — PROGRESS NOTES
Problem: Falls - Risk of  Goal: *Absence of Falls  Description: Document Ricci Johnson Fall Risk and appropriate interventions in the flowsheet. Outcome: Progressing Towards Goal  Note: Fall Risk Interventions:       Mentation Interventions: Adequate sleep, hydration, pain control    Medication Interventions: Teach patient to arise slowly,Patient to call before getting OOB    Elimination Interventions: Call light in reach,Patient to call for help with toileting needs              Problem: Patient Education: Go to Patient Education Activity  Goal: Patient/Family Education  Outcome: Progressing Towards Goal     Problem: Breathing Pattern - Ineffective  Goal: *Absence of hypoxia  Outcome: Progressing Towards Goal  Goal: *Use of effective breathing techniques  Outcome: Progressing Towards Goal     Problem: Pressure Injury - Risk of  Goal: *Prevention of pressure injury  Description: Document Gaston Scale and appropriate interventions in the flowsheet. Outcome: Progressing Towards Goal  Note: Pressure Injury Interventions:  Sensory Interventions: Assess changes in LOC,Minimize linen layers,Float heels,Keep linens dry and wrinkle-free    Moisture Interventions: Absorbent underpads,Internal/External urinary devices,Minimize layers,Check for incontinence Q2 hours and as needed    Activity Interventions: Pressure redistribution bed/mattress(bed type)    Mobility Interventions: Turn and reposition approx.  every two hours(pillow and wedges),Float heels    Nutrition Interventions: Document food/fluid/supplement intake    Friction and Shear Interventions: Minimize layers,HOB 30 degrees or less                Problem: Patient Education: Go to Patient Education Activity  Goal: Patient/Family Education  Outcome: Progressing Towards Goal

## 2022-05-16 NOTE — PROGRESS NOTES
End of Shift Note    Bedside shift change report given to Norma Funez RN (oncoming nurse) by Alissa Hamilton (offgoing nurse). Report included the following information SBAR, Kardex, Intake/Output and MAR    Shift worked:  7am-7pm     Shift summary and any significant changes:     Pt did not leave the bed during the shift. Pt is tolerating the current diet. Rn and LPN with turn team assist performed wound care on the pt. PT turned every 2 hours and during wound care. Pt remained normal sinus on the monitor. Pt had an uneventful shift. Changed linen x 4 today   Wound care x 4 today due to incontinent      Concerns for physician to address:  none   Zone phone for oncoming shift:  none     Activity:  Activity Level: Bed Rest  Number times ambulated in hallways past shift: 0  Number of times OOB to chair past shift: 0    Cardiac:   Cardiac Monitoring: Yes      Cardiac Rhythm: Sinus Rhythm    Access:   Current line(s): PICC     Genitourinary:   Urinary status: external catheter    Respiratory:   O2 Device: Nasal cannula  Chronic home O2 use?: NO  Incentive spirometer at bedside: N/A       GI:  Last Bowel Movement Date: 05/16/22  Current diet:  ADULT DIET Easy to [address] Breakfast, Dinner; Low Calorie/High Protein  Passing flatus: YES  Tolerating current diet: YES       Pain Management:   Patient states pain is manageable on current regimen: YES    Skin:  Gaston Score: 13  Interventions: turn team, speciality bed, float heels, increase time out of bed, foam dressing and internal/external urinary devices    Patient Safety:  Fall Score:  Total Score: 2  Interventions: bed/chair alarm, assistive device (walker, cane, etc), gripper socks and pt to call before getting OOB       Length of Stay:  Expected LOS: 5d 21h  Actual LOS:   [address]

## 2022-05-16 NOTE — PROGRESS NOTES
Transition of Care Plan:     RUR:  17% - moderate risk  Disposition:  [facility]    -CM talked to RN and they want Pt to have Daptomycin at SNF.  Pt is currently on Vancomycin and they want to switch if it is not to expensive.  If they will not accept Daptomycin than they will continue current treatment. - 5/16: [facility] is reviewing.       - Pt reported she is not vaccinated against COVID.  Pt would like to get COVID Vaccine.      Follow up appointments:  PCP; Specialist  DME needed:  SNF will provide.   [facility] 2:00 pm at 5/17. [place] or means to access home:  daughter has access      IM Medicare Letter:  2nd IM Medicare letter - received and signed 5/16. Is patient a BCPI-A Bundle:  No                  If yes, was Bundle Letter given?: N/A  Is patient a [place] and connected with the [address] [place] [address] [employer],[address]  If yes, was [employer] transfer form completed and [facility] notified? Caregiver Contact:  Ivy CARRANZA, [facility], [phone]  Discharge Caregiver contacted prior to discharge?   Yes  5/16 at 2:20 pm: CM attempted to contact dtr. VM left. Care Conference needed? Not at this time. Updated Note 2:10 pm: [facility] set up for 2:00 pm on 5/17. CM called and discussed IV antibiotics (Daptomycin and Cefepime)  to Tamanna, liaison. She is seeing if they can provide. Also, they asked if Care Management can begin the [facility]. CM contacted  to request. Also, information placed in book (with requests) in CM office. CM at bedside to update pt. Pt verbalized understanding. Pt states she will contact her dtr but CM can call too. CM attempted to contact daughter, Barbara Lockhart. VM left. CM sent message to Dr. Reginaldo Cramer to update via perfect serve. 2nd IM Medicare letter - CM gave pt letter and explained. Pt verbalized understanding and signed letter (in chart). Pt has copy of letter. Updated Note 12:40 pm: CM received phone call from Bel[facility] Stiles and Chantelle Morales can both accept.  CM asked pt and she said she wants to go to [facility]. They are ordering a bariatric bed. Asked to set up transport around 2:00 pm tomorrow. Pt had a rapid COVID on 5/13. It does not need to be repeated. They will be coming to evaluate pt in am and will be able to tell CM room #, along with contact information at that time. Initial Note 12:00 pm: CM contacted farzad Nolen ([phone]) for Lenora Johnson. She is going to follow up on referrals and call CM back. Unit CM will continue to follow.      Pricila Wick, RN, BSN, [address] [place] [address] Care Manager  [phone]

## 2022-05-16 NOTE — PROGRESS NOTES
Comprehensive Nutrition Assessment    Type and Reason for Visit: Reassess    Nutrition Recommendations/Plan:   1. Continue diet and supplements as ordered        Nutrition Assessment:  Chart reviewed, provider in room at time of attempted visit. Noted plans to likely discharge tomorrow. Pt's appetite is good per RN flowsheet's. Unsure if she is consuming supplements as this have not been recorded. Labs reviewed, WNL. Pt with macrocytic anemia and folate deficiency (level 2.4) being repleted. Patient Vitals for the past 168 hrs:   % Diet Eaten   05/16/22 0756 76 - 100%   05/14/22 1108 76 - 100%   05/13/22 1113 76 - 100%            Nutrition Related Findings:    Meds: cefepime, folvite, lasix, risaquad, miralax, vancomycin. Edema: nonpitting-generalized, +1-BUE. RLE, +2-LLE. BM 5/16   Wound Type: Multiple,Unstageable(sacrum),Skin tears (friction wound-upper/mid back)    Current Nutrition Intake & Therapies:  Average Meal Intake: %  Average Supplement Intake: None ordered  ADULT DIET Easy to Chew  ADULT ORAL NUTRITION SUPPLEMENT Breakfast, Dinner; Low Calorie/High Protein    Anthropometric Measures:  Height: 5' 3\" (160 cm)  Ideal Body Weight (IBW): 115 lbs (52 kg)     Current Body Wt:  135.5 kg (298 lb 11.6 oz), 250 % IBW. Bed scale  Current BMI (kg/m2): 52.9                          BMI Category: Obese class 3 (BMI 40.0 or greater)    Estimated Daily Nutrient Needs:  Energy Requirements Based On: Formula  Weight Used for Energy Requirements: Current  Energy (kcal/day): 1995 kcal (BMR 1829 x 1. 2AF -200kcal)  Weight Used for Protein Requirements: Current  Protein (g/day): 104-130g (0.8-1.0 g/kg bw)  Method Used for Fluid Requirements: 1 ml/kcal  Fluid (ml/day): 1800 mL or per MD    Nutrition Diagnosis:   · Increased nutrient needs related to  (wound healing) as evidenced by  (PI)  Previous dx continues.      Nutrition Interventions:   Food and/or Nutrient Delivery: Continue current diet,Continue oral nutrition supplement  Nutrition Education/Counseling: No recommendations at this time  Coordination of Nutrition Care: Continue to monitor while inpatient       Goals:  Previous Goal Met: Goal(s) achieved  Goals: PO intake 75% or greater,by next RD assessment (in 4-6 days)       Nutrition Monitoring and Evaluation:   Behavioral-Environmental Outcomes: None identified  Food/Nutrient Intake Outcomes: Food and nutrient intake,Supplement intake  Physical Signs/Symptoms Outcomes: Biochemical data,Skin,Weight    Discharge Planning:    Continue current diet    Adam Arriaga RD, CNSC  Contact: ext [phone]

## 2022-05-16 NOTE — PROGRESS NOTES
Pharmacy Antimicrobial Kinetic Dosing    Indication for Antimicrobials: bone and joint infection   \"1. Redemonstrated left femoral neck displaced fracture with fluid in the fracture gap and edema/enhancement and surrounding marrow and soft tissues. Findings may reflect a healing fracture with possible superimposed osteomyelitis/osteonecrosis. \"    Current Regimen of Each Antimicrobial:  Vancomycin 1000mg IV q12h - started 6 day 10  Cefepime 2gm IV q8h - started  day 10    Previous Antimicrobial Therapy:  LQ + Zosyn IV x1     Vancomycin Goal Level: AUC: 400-600 mg/hr/Liter/day    Date Dose & Interval Measured (mcg/mL) Predicted AUC/SUDHA    0132 1250mg IV q12h 18.2 601    1343 1g q 12 H 18.7 479    0248 1g q12h 18.2 441                 Dosing calculator used: [employer] calculator    Significant Positive Cultures:   5/3: Blood cx: NG - Final  5/3: Urine cx: NG - Final  : Body fluid: NG - Final  : Anaerobic cx: NG - Final    Conditions for Dosing Consideration: obese    Labs:  Recent Labs     22  0248 05/15/22  0437 22  0152   CREA 0.35* 0.38* 0.39*   BUN 13 14 13       Recent Labs     22  0152   WBC 6.3       Temp (24hrs), Av.2 °F (36.8 °C), Min:97.8 °F (36.6 °C), Max:98.5 °F (36.9 °C)    Creatinine Clearance (mL/min):   CrCl (Ideal Body Weight): 68.9  If actual weight < IBW: CrCl (Actual Body Weight) 178.2    Impression/Plan:   ID consulted/following  Continue Vanc and Cefepime. Vancomycin level therapeutic, continue regimen   to check if the nursing home provides daptomycin   Daptomycin consult to give X1 before discharge. Estimated date of discharge is this week and will give a one time daptomycin 8 mg/kg. Will also hold the atorvastatin    CK on 5/10 -->  10  Antimicrobial stop date - pending     Pharmacy will follow daily and adjust medications as appropriate for renal function and/or serum levels.     Thank you,  Lauren Otero, PHARMD

## 2022-05-16 NOTE — PROGRESS NOTES
End of Shift Note    Bedside shift change report given to Kp Carr RN (oncoming nurse) by Sergio James RN (offgoing nurse). Report included the following information SBAR, Kardex, Intake/Output and MAR    Shift worked:  7p-7a     Shift summary and any significant changes:     Pt did not leave the bed during the shift. RN obtained labs from the pt. Pt had an uneventful shift. Concerns for physician to address:       Zone phone for oncoming shift:   [phone]       Activity:  Activity Level: Bed Rest  Number times ambulated in hallways past shift: 0  Number of times OOB to chair past shift: 0    Cardiac:   Cardiac Monitoring: Yes      Cardiac Rhythm: Sinus Rhythm    Access:   Current line(s): PICC     Genitourinary:   Urinary status: external catheter    Respiratory:   O2 Device: Nasal cannula  Chronic home O2 use?: NO  Incentive spirometer at bedside: N/A       GI:  Last Bowel Movement Date: 05/12/22  Current diet:  ADULT DIET Easy to [address] Breakfast, Dinner; Low Calorie/High Protein  Passing flatus: YES  Tolerating current diet: YES       Pain Management:   Patient states pain is manageable on current regimen: YES    Skin:  Gaston Score: 13  Interventions: turn team, speciality bed, float heels, increase time out of bed, foam dressing and internal/external urinary devices    Patient Safety:  Fall Score:  Total Score: 2  Interventions: bed/chair alarm, assistive device (walker, cane, etc), gripper socks and pt to call before getting OOB       Length of Stay:  Expected LOS: 5d 21h  Actual LOS: Puneet Gutiérrez RN

## 2022-05-16 NOTE — PROGRESS NOTES
Problem: Falls - Risk of  Goal: *Absence of Falls  Description: Document Erica Kaye Fall Risk and appropriate interventions in the flowsheet. 5/16/2022 1301 by Bj Castro  Outcome: Progressing Towards Goal  Note: Fall Risk Interventions:       Mentation Interventions: Adequate sleep, hydration, pain control    Medication Interventions: Teach patient to arise slowly,Patient to call before getting OOB    Elimination Interventions: Call light in reach,Patient to call for help with toileting needs           5/16/2022 1259 by Bj Castro  Outcome: Progressing Towards Goal  Note: Fall Risk Interventions:       Mentation Interventions: Adequate sleep, hydration, pain control    Medication Interventions: Teach patient to arise slowly,Patient to call before getting OOB    Elimination Interventions: Call light in reach,Patient to call for help with toileting needs              Problem: Patient Education: Go to Patient Education Activity  Goal: Patient/Family Education  5/16/2022 1301 by Jessica GILLETTE  Outcome: Progressing Towards Goal  5/16/2022 1259 by Bj Castro  Outcome: Progressing Towards Goal     Problem: Breathing Pattern - Ineffective  Goal: *Absence of hypoxia  5/16/2022 1301 by Jessica GILLETTE  Outcome: Progressing Towards Goal  5/16/2022 1259 by Bj Castro  Outcome: Progressing Towards Goal  Goal: *Use of effective breathing techniques  5/16/2022 1301 by Jessica GILLETTE  Outcome: Progressing Towards Goal  5/16/2022 1259 by Bj Castro  Outcome: Progressing Towards Goal     Problem: Pressure Injury - Risk of  Goal: *Prevention of pressure injury  Description: Document Gaston Scale and appropriate interventions in the flowsheet.   5/16/2022 1301 by Bj Castro  Outcome: Progressing Towards Goal  Note: Pressure Injury Interventions:  Sensory Interventions: Assess changes in LOC,Minimize linen layers,Float heels,Keep linens dry and wrinkle-free    Moisture Interventions: Absorbent underpads,Internal/External urinary devices,Minimize layers,Check for incontinence Q2 hours and as needed    Activity Interventions: Pressure redistribution bed/mattress(bed type)    Mobility Interventions: Turn and reposition approx. every two hours(pillow and wedges),Float heels    Nutrition Interventions: Document food/fluid/supplement intake    Friction and Shear Interventions: Minimize layers,HOB 30 degrees or less             5/16/2022 1259 by Dylan Longoria  Outcome: Progressing Towards Goal  Note: Pressure Injury Interventions:  Sensory Interventions: Assess changes in LOC,Minimize linen layers,Float heels,Keep linens dry and wrinkle-free    Moisture Interventions: Absorbent underpads,Internal/External urinary devices,Minimize layers,Check for incontinence Q2 hours and as needed    Activity Interventions: Pressure redistribution bed/mattress(bed type)    Mobility Interventions: Turn and reposition approx.  every two hours(pillow and wedges),Float heels    Nutrition Interventions: Document food/fluid/supplement intake    Friction and Shear Interventions: Minimize layers,HOB 30 degrees or less                Problem: Patient Education: Go to Patient Education Activity  Goal: Patient/Family Education  5/16/2022 1301 by Nikkie GILLETTE  Outcome: Progressing Towards Goal  5/16/2022 1259 by Dylan Longoria  Outcome: Progressing Towards Goal

## 2022-05-17 VITALS
HEART RATE: 80 BPM | SYSTOLIC BLOOD PRESSURE: 131 MMHG | OXYGEN SATURATION: 96 % | DIASTOLIC BLOOD PRESSURE: 47 MMHG | BODY MASS INDEX: 51.91 KG/M2 | HEIGHT: 63 IN | RESPIRATION RATE: 17 BRPM | WEIGHT: 293 LBS | TEMPERATURE: 97.2 F

## 2022-05-17 PROCEDURE — 94760 N-INVAS EAR/PLS OXIMETRY 1: CPT

## 2022-05-17 PROCEDURE — 74011250637 HC RX REV CODE- 250/637: Performed by: INTERNAL MEDICINE

## 2022-05-17 PROCEDURE — 74011250637 HC RX REV CODE- 250/637: Performed by: STUDENT IN AN ORGANIZED HEALTH CARE EDUCATION/TRAINING PROGRAM

## 2022-05-17 PROCEDURE — 77010033678 HC OXYGEN DAILY

## 2022-05-17 PROCEDURE — 74011250636 HC RX REV CODE- 250/636: Performed by: INTERNAL MEDICINE

## 2022-05-17 PROCEDURE — 74011250636 HC RX REV CODE- 250/636: Performed by: HOSPITALIST

## 2022-05-17 PROCEDURE — 74011000258 HC RX REV CODE- 258: Performed by: INTERNAL MEDICINE

## 2022-05-17 PROCEDURE — 74011250637 HC RX REV CODE- 250/637: Performed by: NURSE PRACTITIONER

## 2022-05-17 PROCEDURE — 74011000250 HC RX REV CODE- 250: Performed by: INTERNAL MEDICINE

## 2022-05-17 RX ORDER — GABAPENTIN 100 MG/1
100 CAPSULE ORAL 3 TIMES DAILY
Qty: 90 CAPSULE | Refills: 1 | Status: SHIPPED | OUTPATIENT
Start: 2022-05-17 | End: 2022-06-17

## 2022-05-17 RX ORDER — AMIODARONE HYDROCHLORIDE 200 MG/1
200 TABLET ORAL DAILY
Qty: 30 TABLET | Refills: 0 | Status: SHIPPED | OUTPATIENT
Start: 2022-05-18 | End: 2022-06-17

## 2022-05-17 RX ORDER — FOLIC ACID 1 MG/1
1 TABLET ORAL DAILY
Qty: 30 TABLET | Refills: 1 | Status: SHIPPED | OUTPATIENT
Start: 2022-05-18 | End: 2022-06-17

## 2022-05-17 RX ORDER — ATORVASTATIN CALCIUM 10 MG/1
10 TABLET, FILM COATED ORAL
Qty: 30 TABLET | Refills: 0 | Status: SHIPPED | OUTPATIENT
Start: 2022-06-17

## 2022-05-17 RX ADMIN — VANCOMYCIN HYDROCHLORIDE 1000 MG: 1 INJECTION, POWDER, LYOPHILIZED, FOR SOLUTION INTRAVENOUS at 03:00

## 2022-05-17 RX ADMIN — SODIUM CHLORIDE, PRESERVATIVE FREE 10 ML: 5 INJECTION INTRAVENOUS at 06:03

## 2022-05-17 RX ADMIN — FOLIC ACID 1 MG: 1 TABLET ORAL at 09:26

## 2022-05-17 RX ADMIN — CEFEPIME HYDROCHLORIDE 2 G: 2 INJECTION, POWDER, FOR SOLUTION INTRAVENOUS at 13:11

## 2022-05-17 RX ADMIN — DAPTOMYCIN 700 MG: 500 INJECTION, POWDER, LYOPHILIZED, FOR SOLUTION INTRAVENOUS at 12:23

## 2022-05-17 RX ADMIN — Medication 1 CAPSULE: at 09:27

## 2022-05-17 RX ADMIN — GABAPENTIN 100 MG: 100 CAPSULE ORAL at 09:27

## 2022-05-17 RX ADMIN — SODIUM CHLORIDE, PRESERVATIVE FREE 10 ML: 5 INJECTION INTRAVENOUS at 14:00

## 2022-05-17 RX ADMIN — CEFEPIME HYDROCHLORIDE 2 G: 2 INJECTION, POWDER, FOR SOLUTION INTRAVENOUS at 06:03

## 2022-05-17 RX ADMIN — FUROSEMIDE 40 MG: 40 TABLET ORAL at 09:26

## 2022-05-17 RX ADMIN — METOPROLOL TARTRATE 25 MG: 25 TABLET, FILM COATED ORAL at 09:26

## 2022-05-17 RX ADMIN — RIVAROXABAN 20 MG: 20 TABLET, FILM COATED ORAL at 09:26

## 2022-05-17 RX ADMIN — MICONAZOLE NITRATE: 20 CREAM TOPICAL at 09:27

## 2022-05-17 RX ADMIN — CASTOR OIL AND BALSAM, PERU: 788; 87 OINTMENT TOPICAL at 09:30

## 2022-05-17 RX ADMIN — AMIODARONE HYDROCHLORIDE 400 MG: 200 TABLET ORAL at 09:25

## 2022-05-17 RX ADMIN — Medication: at 09:00

## 2022-05-17 RX ADMIN — POLYETHYLENE GLYCOL 3350 17 G: 17 POWDER, FOR SOLUTION ORAL at 09:27

## 2022-05-17 NOTE — PROGRESS NOTES
[facility] to [facility]                                                                        57 y.o.   female    [address] [facility]   Room: [address] [place] [address]    MRM 3 SURG TELE  Unit Phone# :  3rd Medical Surgical Unit [phone]      [address]  \[facility]\"" [address] [address]  Dept: [phone]  Loc: [phone]                    SITUATION     Admitted:  5/3/2022         Attending Provider:  Mariana Hollins MD       Consultations:  IP CONSULT TO HOSPITALIST  IP CONSULT TO ORTHOPEDIC SURGERY  IP CONSULT TO INFECTIOUS DISEASES  IP CONSULT TO INTENSIVIST  IP CONSULT TO INTERVENTIONAL RADIOLOGY  IP CONSULT TO CARDIOLOGY    PCP:  Unknown, Provider, MD   None    Treatment Team: Attending Provider: Mariana Hollins MD; Consulting Provider: Mirtha Wallis MD; Utilization Review: Abraham Menchaca RN; Consulting Provider: Juan Hirsch MD; Consulting Provider: Alissa Franz NP; Care Manager: Alyx Schwartz RN; Staff Nurse: Denney Bumpers    Admitting Dx: Hip osteomyelitis ([address] [M86.9]       Principal Problem: Hip osteomyelitis ([address]    * No surgery found * of      BY: * Surgery not found *             ON: * No surgery found *                  Code Status: Full Code                Advance Directives:   Advance Care Planning 5/6/2022   Patient's Healthcare Decision Maker is: Named in scanned ACP document   Confirm Advance Directive Yes, on file   Does the patient have other document types -    (Send w/patient)   Received       Isolation:  There are currently no Active Isolations       MDRO: No current active infections    Pain Medications given:  Oxycodone  Last dose: 5-16-22  at  2011    Special Equipment needed: bed and or Hoya lift Type of equipment: Bariatric bed         BACKGROUND     Allergies:   Allergies   Allergen Reactions    Cefazolin Rash    Other Plant, Animal, Environmental Rash     Ivory soap caused [Dear  ___] : Dear  [unfilled], [Please see my note below.] : Please see my note below. rash on hands. Past Medical History:   Diagnosis Date    Acute on chronic respiratory failure with hypoxia and hypercapnia (HCC) 11/20/2020    Atrial fibrillation ([facility] 11/4/2020    CHF (congestive heart failure) ([facility]     Diabetes ([facility]     Hypertension     Ill-defined condition     high cholesterol    Ill-defined condition     tachycardia    JOSLYN (obstructive sleep apnea) 11/20/2020    Other and unspecified symptoms and signs involving general sensations and perceptions     ruptured disc    S/P cardiac cath 11/4/2020    11/3/2020 nonobstructive disease       Past Surgical History:   Procedure Laterality Date    MA COMPRE ELECTROPHYSIOL XM W/LEFT ATRIAL PACNG/REC N/A 11/23/2020    Lt Atrial Pace & Record During Ep Study performed by Antoine Salinas MD at [facility] CARDIAC CATH LAB    MA COMPRE EP EVAL ABLTJ 3D MAPG TX SVT N/A 11/23/2020    ABLATION A-FLUTTER performed by Antoine Salinas MD at [facility] CARDIAC CATH LAB    MA ICAR CATHETER ABLATION ARRHYTHMIA ADD ON N/A 11/23/2020    Ablation Svt/Vt Add On performed by Antoine Salinas MD at [facility] CARDIAC CATH LAB    MA INTRACARDIAC ELECTROPHYSIOLOGIC 3D MAPPING N/A 11/23/2020    Ep 3d Mapping performed by Antoine Salinas MD at [facility] CARDIAC CATH LAB    MA STIM/PACING HEART POST IV DRUG INFU N/A 11/23/2020    Drug Stimulation performed by Antoine Salinas MD at [facility] CARDIAC CATH LAB       Medications Prior to Admission   Medication Sig    predniSONE (DELTASONE) 10 mg tablet 4 tabs daily for 2 days, then 3 tabs daily for 2 days , then 2 tabs daily for 2 days, then 1 tab daily for 2 days    albuterol-ipratropium (DUO-NEB) 2.5 mg-0.5 mg/3 ml nebu 3 mL by Nebulization route every four (4) hours as needed for Wheezing, Shortness of Breath or Respiratory Distress.  acetaminophen (TYLENOL) 325 mg tablet Take 650 mg by mouth every six (6) hours as needed for Pain.  furosemide (LASIX) 40 mg tablet Take 1 Tab by mouth daily.  (Patient taking [Consult Closing:] : Thank you very much for allowing me to participate in the care of this patient.  If you have any questions, please do not hesitate to contact me. differently: Take 20 mg by mouth daily. Takes prn when fluid overloaded)    [DISCONTINUED] atorvastatin (LIPITOR) 10 mg tablet Take 10 mg by mouth nightly.  metoprolol (LOPRESSOR) 25 mg tablet Take 25 mg by mouth two (2) times a day. Hard scripts included in transfer packet yes    Vaccinations: There is no immunization history on file for this patient. Readmission Risks:    Known Risks: cubits ulcers due to non ambulation, fall risk. The Charlson CoMorbitiy Index tool is an evidenced based tool that has more automatic generated information. The tool looks at many different items such as the age of the patient, how many times they were admitted in the last calendar year, current length of stay in the hospital and their diagnosis. All of these items are pulled automatically from information documented in the chart from various places and will generate a score that predicts whether a patient is at low (less than 13), medium (13-20) or high (21 or greater) risk of being readmitted.         ASSESSMENT                Temp: 98 °F (36.7 °C) (05/17/22 1115) Pulse (Heart Rate): 81 (05/17/22 1115)     Resp Rate: 16 (05/17/22 1115)           BP: (!) 172/67 (05/17/22 1115)     O2 Sat (%): 99 % (05/17/22 1115)     Weight: 135.5 kg (298 lb 11.6 oz)    Height: 5' 3\" (160 cm) (05/11/22 1409)       If above not within 1 hour of discharge:    BP:_____  P:____  R:____ T:_____ O2 Sat: ___%  O2: ______    Active Orders   Diet    ADULT DIET Easy to Chew         Orientation: oriented to time, place, person and situation     Active Behaviors: None                                   Active Lines/Drains:  PICC Line     Urinary Status: External catheter     Last BM: Last Bowel Movement Date: 05/17/22     Skin Integrity: Wound (add Wound LDA) (buttucks, upper legs, thighs)             Mobility: Very limited   Weight Bearing Status: NWB (Non Weight Bearing)                Lab Results   Component Value Date/Time    Glucose 106 (H) 05/16/2022 02:48 AM    Hemoglobin A1c 5.4 04/21/2022 01:10 AM    INR 1.4 (H) 11/24/2020 02:59 AM    INR 1.4 (H) 11/23/2020 03:25 AM    HGB 10.2 (L) 05/14/2022 01:52 AM    HGB 10.7 (L) 05/13/2022 12:28 AM        RECOMMENDATION     See After Visit Summary (AVS) for:  · Discharge instructions  · After [address]   · Special equipment needed (entered pre-discharge by Care Management)  · Medication Reconciliation    · Follow up Appointment(s)         Report given/sent by:  Shelley Section                    Verbal report given to:   [facility] nurse   FAXED to:           Estimated discharge time:  5/17/2022 at [Sincerely,] : Sincerely, [Courtesy Letter:] : I had the pleasure of seeing your patient, [unfilled], in my office today. [FreeTextEntry2] : Dr. Jaclyn Blackmon MD [FreeTextEntry3] : Colby Vitale MD, PhD\par Chief, Division of Laryngology\par Department of Otolaryngology\par [facility]\par Pediatric Otolaryngology, [facility]\par  of Otolaryngology\par [employer] [facility] [employer]\par

## 2022-05-17 NOTE — PROGRESS NOTES
End of Shift Note    Bedside shift change report given to SATURNINO Paredes (oncoming nurse) by Marcelino Pulido RN (offgoing nurse). Report included the following information SBAR, Kardex and MAR    Shift worked:  7PM-7AM     Shift summary and any significant changes:    Patient given prn oxycodone and flexeril for pain management. Dressings changed this am during incontinent episode. Continues on IV abt therapy. Plan to discharge today to SNF today.      Concerns for physician to address: discharge     Zone phone for oncoming shift:             Marcelino Pulido RN

## 2022-05-17 NOTE — DISCHARGE SUMMARY
Hospitalist Discharge Summary     Patient ID:  Josie Schaefer  [id number]  57 y.o.  1960  5/3/2022    PCP on record: Unknown, Provider, MD    Admit date: 5/3/2022  Discharge date and time: 5/17/2022    DISCHARGE DIAGNOSIS:  As below     CONSULTATIONS:  IP CONSULT TO HOSPITALIST  IP CONSULT TO ORTHOPEDIC SURGERY  IP CONSULT TO INFECTIOUS DISEASES  IP CONSULT TO INTENSIVIST  IP CONSULT TO INTERVENTIONAL RADIOLOGY  IP CONSULT TO CARDIOLOGY    Excerpted HPI from H&P of Atiya Mckinney MD:  History      CHIEF COMPLAINT: Sent in by home health with worsening buttock wounds since last discharge 4/27/2022, found to have a new left hip fracture     HISTORY OF PRESENT ILLNESS:      57 y. o. female     Lives with her daughter who helps care for her  Prior notes not a good social situation for her to be living, house very cluttered     Past medical history of chronic hypoxic and hypercapnic respiratory failure  Baseline on 4 L nasal cannula at baseline  Suspected COPD   Chronic diastolic congestive heart failure  Probable JOSLYN and obesity hypoventilation syndrome on CPAP at night     Essentially bedbound for 2 years, due to arthritis in her respiratory issues  baseline reports severe pain in both her legs due to bad arthritis in her knees     Multiple prior admissions due to respiratory issues in the past     Recently admitted 4/21 to 4/27/2022 with increasing shortness of breath and hypoxia on her home O2  Found to have acute on chronic diastolic congestive heart failure and COPD  Treated with a course of steroids, p.o. doxycycline for 5 days, nebs, IV diuresis  Found to have buttock wounds that were managed by wound care  Eventually discharged home with home health     After discharge developed severe diarrhea \"terrible\" for the first couple days now improving  She required a lot of cleaning which seem to exacerbate some of her buttock wounds  Home health was following with concerns for new developing ulcers  This prompted her transfer back to the emergency room     Emergency room  WBC elevated to 16.4, recently received steroids  CT scan pelvis to look for osteomyelitis showed a displaced femoral neck fracture              Showed erosive changes and sclerosis              Also possible evidence of acute osteo or septic arthritis at the hip  MRI left hip prelim showed left femoral neck fracture with surrounding enhancement and edema within the hip musculature              Felt to be less consistent with osteomyelitis or infection     She says her breathing is \"okay\"  No chest pain or abdominal pain, headaches, sore throat  Severe pain in her legs especially when moved  She denies any trauma in the past few years  She is very vague about when the pain worsened  She said it recently became more constant but cannot pin down timeframe               she said maybe weeks to months  Carol Mcclellan was contacted about the hip findings  abnormal UA noted --> due to IV antibiotics  We were called to admit the patient       ____________________________________________________________________  DISCHARGE SUMMARY/HOSPITAL COURSE:  for full details see H&P, daily progress notes, labs, consult notes. Left hip fracture displaced POA age undetermined  ? left hip osteomyelitis/septic arthritis POA  Bilateral buttock and sacral wounds POA  -She  reports trauma years ago, denies any recently  -She is very vague in terms of how long this has been going on  -Found on CT scan done in the emergency room rule out osteomyelitis  -Original reason sent to the ED was because of worsening buttock/sacral wounds  -MRI was done which is more suggestive of fracture and osteomyelitis  -[facility] evaluated no surgical intervention planned  -As needed oxycodone and morphine  -wound care on case  -S/p  Aspiration?/Bx by IR, culture negative. Pt has been on multiple ABx since 5/4 , may affect final culture results. -ID on board. On vancomycin and cefepime currently. ID recommends daptomycin and cefepime on discharge. End date 6/16. No statin while on daptomycin. ID follow-up on 6/30. Weekly CBC, CMP, CK & ESR/CRP every other week. Resume Lipitor after completing ABx.  -Pharmacy consulted to give a dose of daptomycin prior to discharge.   -Complaints of bilateral lower extremity pain. Has some numbness. Started on low-dose gabapentin  -Remains medically stable for discharge.     Diarrhea at home  -resolved     Possible UTI POA-    -NGTD on URx, already got Abx     Macrocytic anemia  Thrombocytopenia   -Folate level low. B12 wnl  -We will start folic acid supplement     Chronic hypoxic respiratory failure/DHF   Baseline on 3 to 4 L NC O2    COPD   Suspected JOSLYN and/or OHS  stable on 3-4LNC, her baseline requirement.    Recent admission 4/21 to 4/27/2022 with shortness of breath and hypoxia, treated with a course of steroids, doxycycline, diuresis  Resume Lasix. Increased serum bicarb likely metabolic compensation for respiratory acidosis, chronic. Continue nebs and oxygen  CPAP HS and PRN during napping       Paroxysmal A. Fib  Was not on AC as nosebleeds with aspirin.    Multiple prior cardioversion procedures  Continue metoprolol/amio as per cards  On xarelto per cards. Tolerating so far  Currently in normal sinus rhythm     Steroid induced hyperglycemia  Hyperglycemic last admit on steroids  A1c level 5.4 on 4/21  BG wnl for most part     HTN   controlled.  Cont' PTA meds.      Morbid obesity/Impaired mobility/functional quadriplegia  Does not ambulate for over 2 years  With her daughter, having increasing difficulty caring for the patient      _______________________________________________________________________  Patient seen and examined by me on discharge day. Pertinent Findings:  Gen:    Not in distress  Chest: Clear lungs  CVS:   Regular rhythm.   No edema  Abd:  Soft, not distended, not tender  Neuro:  Alert, oriented _______________________________________________________________________  DISCHARGE MEDICATIONS:   Current Discharge Medication List      START taking these medications    Details   rivaroxaban (XARELTO) 20 mg tab tablet Take 1 Tablet by mouth daily for 30 days. Qty: 30 Tablet, Refills: 1  Start date: 5/18/2022, End date: 6/17/2022      L.acid,para-B. bifidum-S.therm (RISAQUAD) 8 billion cell cap cap Take 1 Capsule by mouth daily for 36 days. Qty: 30 Capsule, Refills: 1  Start date: 5/18/2022, End date: 6/23/2022      amiodarone (CORDARONE) 200 mg tablet Take 1 Tablet by mouth daily for 30 days. Qty: 30 Tablet, Refills: 0  Start date: 5/18/2022, End date: 6/17/2022      cefepime 2 gram 2 g IVPB 2 g by IntraVENous route every eight (8) hours for 30 days. Qty: 90 Dose, Refills: 0  Start date: 5/17/2022, End date: 6/16/2022      DAPTOmycin (CUBICIN) IVPB 8 mg/kg daily  Qty: 30 Dose, Refills: 0  Start date: 5/17/2022, End date: 4/33/5273      folic acid (FOLVITE) 1 mg tablet Take 1 Tablet by mouth daily for 30 days. Qty: 30 Tablet, Refills: 1  Start date: 5/18/2022, End date: 6/17/2022      gabapentin (NEURONTIN) 100 mg capsule Take 1 Capsule by mouth three (3) times daily for 30 days. Max Daily Amount: 300 mg. Qty: 90 Capsule, Refills: 1  Start date: 5/17/2022, End date: 6/16/2022    Associated Diagnoses: Sciatica, unspecified laterality         CONTINUE these medications which have CHANGED    Details   atorvastatin (LIPITOR) 10 mg tablet Take 1 Tablet by mouth nightly. START AFTER COMPLETING ANTIBIOTICS TREATMENT ON 6/17  Qty: 30 Tablet, Refills: 0  Start date: 6/17/2022         CONTINUE these medications which have NOT CHANGED    Details   albuterol-ipratropium (DUO-NEB) 2.5 mg-0.5 mg/3 ml nebu 3 mL by Nebulization route every four (4) hours as needed for Wheezing, Shortness of Breath or Respiratory Distress.   Qty: 100 Nebule, Refills: 0      acetaminophen (TYLENOL) 325 mg tablet Take 650 mg by mouth every six (6) hours as needed for Pain. furosemide (LASIX) 40 mg tablet Take 1 Tab by mouth daily. Qty: 30 Tab, Refills: 0      metoprolol (LOPRESSOR) 25 mg tablet Take 25 mg by mouth two (2) times a day. STOP taking these medications       predniSONE (DELTASONE) 10 mg tablet Comments:   Reason for Stopping:                 Patient Follow Up Instructions: Activity: Activity as tolerated  Diet: ADULT DIET Easy to Chew  ADULT ORAL NUTRITION SUPPLEMENT Breakfast, Dinner; Low Calorie/High Protein  Wound Care: As directed  Follow-up with PCP in  1 week. ID recommends daptomycin and cefepime on discharge. End date 6/16. No statin while on daptomycin. ID follow-up on 6/30. Weekly CBC, CMP, CK & ESR/CRP every other week. Resume Lipitor after completing ABx.       Follow-up Information     Follow up With Specialties Details Why Contact Info    Zhang Bains MD Infectious Disease Physician, Internal Medicine Physician On 6/30/2022 Virtual visit  6/30 at 3:30 pm. [address] [facility]  [phone]      [address] [place] [address]   [address]    Marsha Lynn MD Clinical Cardiac Electrophysiology Physician, [address] [place] [address] Vascular Surgery, Cardiovascular Disease Physician  Cardiology [address]  [phone]      Lady Pito MD General Practice Schedule an appointment as soon as possible for a visit in 1 week Visiting Physicians PCP.  [address] [address]  [phone]      Unknown, Provider, MD Internal Medicine Physician   Patient not available to ask          ________________________________________________________________    Risk of deterioration: Moderate    Condition at Discharge:  Stable  __________________________________________________________________    Disposition  IP Rehab    ____________________________________________________________________    Code Status: Full Code  ___________________________________________________________________      Total time in minutes spent coordinating this discharge (includes going over instructions, follow-up, prescriptions, and preparing report for sign off to her PCP) :  >30 minutes    Signed:  Gloria Leyva MD

## 2022-05-17 NOTE — PROGRESS NOTES
Problem: Falls - Risk of  Goal: *Absence of Falls  Description: Document Marciana Distance Fall Risk and appropriate interventions in the flowsheet.   Outcome: Progressing Towards Goal  Note: Fall Risk Interventions:       Mentation Interventions: Adequate sleep, hydration, pain control    Medication Interventions: Patient to call before getting OOB,Teach patient to arise slowly    Elimination Interventions: Call light in reach              Problem: Breathing Pattern - Ineffective  Goal: *Absence of hypoxia  Outcome: Progressing Towards Goal  Goal: *Use of effective breathing techniques  Outcome: Progressing Towards Goal

## 2022-05-17 NOTE — PROGRESS NOTES
Pt ready for discharge from a CM standpoint. [facility] to transport patient to [facility] for SNF level rehab. Nurse to call report to [phone]. Pt going to room 316A    Transition of Care Plan:    RUR: 16% moderate risk  Disposition:  SNF ([place] room 316A)  Nursing to call report to [phone]  Follow up appointments: ID follow up on 6/30/22  DME needed: going to SNF and they will provide  Transportation at Discharge: [facility] at [facility] or [place] to access home: going to SNF        Medicare Letter:  Given on 5/16/22  Is patient a BCPI-A Bundle:  n/a         If yes, was Bundle Letter given?:    Is patient a  and connected with the [facility]? n/a               If yes, was [place] transfer form completed and VA notified? Caregiver Contact: Augustina Weeks ([phone])   Discharge Caregiver contacted prior to discharge? Daughter to be contacted by patient  Care Conference needed?:     Not needed    Transition of Care Plan to SNF/Rehab    SNF/Rehab Transition:  Patient has been accepted to [facility] and meets criteria for admission. Patient will transported by [facility] and expected to leave at 2:00pm.    Communication to Patient/Family:  Met with patient and daughter (identified care giver) and they are agreeable to the transition plan. Communication to SNF/Rehab:  Bedside RN, Melly Michelle, has been notified to update the transition plan to the facility and call report (phone number [phone]). Discharge information has been updated on the AVS.     Discharge instructions to be fax'd to facility through [facility]. Nursing Please include all hard scripts for controlled substances, med rec and dc summary, and AVS in packet.      Reviewed and confirmed with facility, Tamanna at [facility], can manage the patient care needs for the following:     Kary Soto with (X) only those applicable:    Medication:  [x]  Medications will be available at the facility  [x]  IV Antibiotics Daptomycin and Cefapime until 6/16/22  [x] Controlled Substance - hard copy to be sent with patient   [x]  Weekly Labs   Documents:  [x] Hard RX  [x] MAR  [x] Kardex  [x] AVS  [x]Transfer Summary  [x]Discharge   Equipment:  []  CPAP/BiPAP  []  Wound Vacuum  []  Petersen or Urinary Device  [x]  PICC/Central Line  []  Nebulizer  []  Ventilator   Treatment:  []Isolation (for MRSA, VRE, etc.)  []Surgical Drain Management  []Tracheostomy Care  []Dressing Changes  []Dialysis with transportation and chair time. []PEG Care  [x]Oxygen - baseline 3-4L  []Daily Weights for Heart Failure   Dietary:  []Any diet limitations  []Tube Feedings   []Total Parenteral Management (TPN)   Eligible for Medicaid Long Term Services and Supports  Yes:  [] Eligible for medical assistance or will become eligible within 180 days and UAI completed. [x] Provider/Patient and/or support system has requested screening. [] UAI copy provided to patient or responsible party. [] UAI unavailable at discharge will send once processed to SNF provider. [] UAI unavailable at discharged mailed to patient  No:   [] Private pay and is not financially eligible for Medicaid within the next 180 days. [] Reside out-of-state. [] A residents of a state owned/operated facility that is licensed  by [address] [place] [facility] or [facility]  [] Enrollment in [facility] hospice services  [] [address] [place] [address] [facility]  [] Patient /Family declines to have screening completed or provide financial information for screening     Financial Resources:  Medicaid    [] Initiated and application pending   [x] Full coverage     Advanced Care Plan:  []Surrogate Decision Maker of Care  []POA  []Communicated Code Status: FULL   Other    Has virtual ID appointment on 6/30/2022 at 3:30pm.  IV antibiotics until 6/16/22, PICC to be removed at this time         Care Management Interventions  PCP Verified by CM:  Yes  Palliative Care Criteria Met (RRAT>21 & CHF Dx)?: Yes  Palliative Consult Recommended?: Yes  Mode of Transport at Discharge: BLS  Transition of Care Consult (CM Consult): Discharge Planning  Discharge Durable Medical Equipment: No  Physical Therapy Consult: No  Occupational Therapy Consult: No  Speech Therapy Consult: No  Support Systems: Child(dejuan)  Confirm Follow Up Transport: Family  Discharge Location  Patient Expects to be Discharged to[de-identified] Skilled nursing facility    Sd.  Anitha Saenz, [address] [place] [facility]  Advanced [employer] ACP Facilitator  Zone Phone: [phone]

## 2022-05-17 NOTE — PROGRESS NOTES
Problem: Falls - Risk of  Goal: *Absence of Falls  Description: Document Ginger Nunez Fall Risk and appropriate interventions in the flowsheet. Outcome: Progressing Towards Goal  Note: Fall Risk Interventions:       Mentation Interventions: Adequate sleep, hydration, pain control    Medication Interventions: Teach patient to arise slowly,Patient to call before getting OOB    Elimination Interventions: Call light in reach              Problem: Patient Education: Go to Patient Education Activity  Goal: Patient/Family Education  Outcome: Progressing Towards Goal     Problem: Breathing Pattern - Ineffective  Goal: *Absence of hypoxia  Outcome: Progressing Towards Goal  Goal: *Use of effective breathing techniques  Outcome: Progressing Towards Goal     Problem: Pressure Injury - Risk of  Goal: *Prevention of pressure injury  Description: Document Gaston Scale and appropriate interventions in the flowsheet.   Outcome: Progressing Towards Goal  Note: Pressure Injury Interventions:  Sensory Interventions: Keep linens dry and wrinkle-free,Minimize linen layers    Moisture Interventions: Minimize layers,Internal/External urinary devices,Assess need for specialty bed,Apply protective barrier, creams and emollients    Activity Interventions: Assess need for specialty bed,PT/OT evaluation    Mobility Interventions: HOB 30 degrees or less,PT/OT evaluation,Assess need for specialty bed    Nutrition Interventions: Document food/fluid/supplement intake    Friction and Shear Interventions: HOB 30 degrees or less,Minimize layers,Lift team/patient mobility team                Problem: Patient Education: Go to Patient Education Activity  Goal: Patient/Family Education  Outcome: Progressing Towards Goal

## 2022-05-17 NOTE — DISCHARGE INSTRUCTIONS
Patient Follow Up Instructions: Activity: Activity as tolerated  Diet: ADULT DIET Easy to Chew  ADULT ORAL NUTRITION SUPPLEMENT Breakfast, Dinner; Low Calorie/High Protein  Wound Care: As directed  Follow-up with PCP in  1 week. ID recommends daptomycin and cefepime on discharge.  End date 6/16.  No statin while on daptomycin.  ID follow-up on 6/30. Weekly CBC, CMP, CK & ESR/CRP every other week. Resume Lipitor after completing ABx.

## 2022-05-17 NOTE — PROGRESS NOTES
Problem: Falls - Risk of  Goal: *Absence of Falls  Description: Document Harris Carter Fall Risk and appropriate interventions in the flowsheet. 5/17/2022 1122 by Jannice Aase S  Outcome: Resolved/Not Met  5/17/2022 0931 by Bridget Miramontes  Outcome: Progressing Towards Goal  Note: Fall Risk Interventions:       Mentation Interventions: Adequate sleep, hydration, pain control    Medication Interventions: Teach patient to arise slowly,Patient to call before getting OOB    Elimination Interventions: Call light in reach              Problem: Patient Education: Go to Patient Education Activity  Goal: Patient/Family Education  5/17/2022 1122 by Jannice Aase S  Outcome: Resolved/Not Met  5/17/2022 0931 by Bridget Miramontes  Outcome: Progressing Towards Goal     Problem: Breathing Pattern - Ineffective  Goal: *Absence of hypoxia  5/17/2022 1122 by Bridget Miramontes  Outcome: Resolved/Not Met  5/17/2022 0931 by Bridget Miramontes  Outcome: Progressing Towards Goal  Goal: *Use of effective breathing techniques  5/17/2022 1122 by Jannice Aase S  Outcome: Resolved/Not Met  5/17/2022 0931 by Bridget Miramontes  Outcome: Progressing Towards Goal     Problem: Pressure Injury - Risk of  Goal: *Prevention of pressure injury  Description: Document Gaston Scale and appropriate interventions in the flowsheet.   5/17/2022 1122 by Jannice Aase S  Outcome: Resolved/Not Met  5/17/2022 0931 by Bridget Miramontes  Outcome: Progressing Towards Goal  Note: Pressure Injury Interventions:  Sensory Interventions: Keep linens dry and wrinkle-free,Minimize linen layers    Moisture Interventions: Minimize layers,Internal/External urinary devices,Assess need for specialty bed,Apply protective barrier, creams and emollients    Activity Interventions: Assess need for specialty bed,PT/OT evaluation    Mobility Interventions: HOB 30 degrees or less,PT/OT evaluation,Assess need for specialty bed    Nutrition Interventions: Document food/fluid/supplement intake    Friction and Shear Interventions: HOB 30 degrees or less,Minimize layers,Lift team/patient mobility team                Problem: Patient Education: Go to Patient Education Activity  Goal: Patient/Family Education  5/17/2022 1122 by Belem GILLETTE  Outcome: Resolved/Not Met  5/17/2022 0931 by Denise Coleman  Outcome: Progressing Towards Goal

## 2022-05-17 NOTE — PROGRESS NOTES
Problem: Falls - Risk of  Goal: *Absence of Falls  Description: Document Jessika Toribio Fall Risk and appropriate interventions in the flowsheet. 5/17/2022 1122 by Shahla GILLETTE  Outcome: Resolved/Not Met  5/17/2022 0931 by Pedrito Cross  Outcome: Progressing Towards Goal  Note: Fall Risk Interventions:       Mentation Interventions: Adequate sleep, hydration, pain control    Medication Interventions: Teach patient to arise slowly,Patient to call before getting OOB    Elimination Interventions: Call light in reach              Problem: Patient Education: Go to Patient Education Activity  Goal: Patient/Family Education  5/17/2022 1122 by Shahla GILLETTE  Outcome: Resolved/Not Met  5/17/2022 0931 by Pedrito Cross  Outcome: Progressing Towards Goal     Problem: Breathing Pattern - Ineffective  Goal: *Absence of hypoxia  5/17/2022 1122 by Pedrito Cross  Outcome: Resolved/Not Met  5/17/2022 0931 by Pedrito Cross  Outcome: Progressing Towards Goal  Goal: *Use of effective breathing techniques  5/17/2022 1122 by Shahla GILLETTE  Outcome: Resolved/Not Met  5/17/2022 0931 by Pedrito Cross  Outcome: Progressing Towards Goal     Problem: Pressure Injury - Risk of  Goal: *Prevention of pressure injury  Description: Document Gaston Scale and appropriate interventions in the flowsheet.   5/17/2022 1122 by Shahla GILLETTE  Outcome: Resolved/Not Met  5/17/2022 0931 by Pedrito Cross  Outcome: Progressing Towards Goal  Note: Pressure Injury Interventions:  Sensory Interventions: Keep linens dry and wrinkle-free,Minimize linen layers    Moisture Interventions: Minimize layers,Internal/External urinary devices,Assess need for specialty bed,Apply protective barrier, creams and emollients    Activity Interventions: Assess need for specialty bed,PT/OT evaluation    Mobility Interventions: HOB 30 degrees or less,PT/OT evaluation,Assess need for specialty bed    Nutrition Interventions: Document food/fluid/supplement intake    Friction and Shear Interventions: HOB 30 degrees or less,Minimize layers,Lift team/patient mobility team                Problem: Patient Education: Go to Patient Education Activity  Goal: Patient/Family Education  5/17/2022 1122 by Luan GILLETTE  Outcome: Resolved/Not Met  5/17/2022 0931 by Aditi Hamm  Outcome: Progressing Towards Goal     Problem: Falls - Risk of  Goal: *Absence of Falls  Description: Document Sterling Ralphs Fall Risk and appropriate interventions in the flowsheet.   Outcome: Resolved/Not Met     Problem: Patient Education: Go to Patient Education Activity  Goal: Patient/Family Education  Outcome: Resolved/Not Met

## 2022-05-17 NOTE — PROGRESS NOTES
End of Shift Note    Bedside shift change report given to SATURNINO Paredes (oncoming nurse) by Rachael Cohn RN (offgoing nurse). Report included the following information SBAR, Kardex and MAR    Shift worked:  7PM-7AM     Shift summary and any significant changes:    Patient given prn oxycodone and flexeril for pain management. Dressings clean, dry and intact. Continues on IV abt therapy. Plan to discharge today to SNF today.      Concerns for physician to address: discharge     Zone phone for oncoming shift:             Rachael Cohn RN

## 2022-05-17 NOTE — PROGRESS NOTES
[facility]                                                                        57 y.o.   female    [facility]   Room: [address] [place] [address]    MRM 3 SURG TELE  Unit Phone# :  [phone]    [address]  [facility] [address] [facility] [address]  Dept: [phone]  Loc: [phone]                    SITUATION     Admitted:  5/3/2022         Attending Provider:  Virginia Stanton MD       Consultations:  IP CONSULT TO HOSPITALIST  IP CONSULT TO ORTHOPEDIC SURGERY  IP CONSULT TO INFECTIOUS DISEASES  IP CONSULT TO INTENSIVIST  IP CONSULT TO INTERVENTIONAL RADIOLOGY  IP CONSULT TO CARDIOLOGY    PCP:  Unknown, Provider, MD   None    Treatment Team: Attending Provider: Virginia Stanton MD; Consulting Provider: Nehemiah Pope MD; Utilization Review: Jose Raul Lopez RN; Consulting Provider: Damian Sosa MD; Consulting Provider: Palak Hankins NP; Care Manager: Theador Essex, RN; Staff Nurse: Britt Kitchen    Admitting Dx: Hip osteomyelitis ([facility] [address] [M86.9]       Principal Problem: Hip osteomyelitis ([facility] [address]    * No surgery found * of      BY: * Surgery not found *             ON: * No surgery found *                  Code Status: Full Code                Advance Directives:   Advance Care Planning 5/6/2022   Patient's Healthcare Decision Maker is: Named in scanned ACP document   Confirm Advance Directive Yes, on file   Does the patient have other document types -    (Send w/patient)   Received       Isolation:  There are currently no Active Isolations       MDRO: No current active infections    Pain Medications given:  Oxycodone    Last dose:  5/17/2020     Special Equipment needed:  Type of equipment:          BACKGROUND     Allergies: Allergies   Allergen Reactions    Cefazolin Rash    Other Plant, Animal, Environmental Rash     Ivory soap caused rash on hands.        Past Medical History:   Diagnosis Date    Acute on chronic respiratory failure with hypoxia and hypercapnia (HCC) 11/20/2020    Atrial fibrillation ([facility] [address] 11/4/2020    CHF (congestive heart failure) ([facility] [address]     Diabetes ([facility]     Hypertension     Ill-defined condition     high cholesterol    Ill-defined condition     tachycardia    JOSLYN (obstructive sleep apnea) 11/20/2020    Other and unspecified symptoms and signs involving general sensations and perceptions     ruptured disc    S/P cardiac cath 11/4/2020    11/3/2020 nonobstructive disease       Past Surgical History:   Procedure Laterality Date    KS COMPRE ELECTROPHYSIOL XM W/LEFT ATRIAL PACNG/REC N/A 11/23/2020    Lt Atrial Pace & Record During Ep Study performed by Antoine Salinas MD at [facility] CARDIAC CATH LAB    KS COMPRE EP EVAL ABLTJ 3D MAPG TX SVT N/A 11/23/2020    ABLATION A-FLUTTER performed by Antoine Salinas MD at [facility] CARDIAC CATH LAB    KS ICAR CATHETER ABLATION ARRHYTHMIA ADD ON N/A 11/23/2020    Ablation Svt/Vt Add On performed by Antoine Salinas MD at [facility] CARDIAC CATH LAB    KS INTRACARDIAC ELECTROPHYSIOLOGIC 3D MAPPING N/A 11/23/2020    Ep 3d Mapping performed by Antoine Salinas MD at [facility] CARDIAC CATH LAB    KS STIM/PACING HEART POST IV DRUG INFU N/A 11/23/2020    Drug Stimulation performed by Antoine Salinas MD at [facility] CARDIAC CATH LAB       Medications Prior to Admission   Medication Sig    predniSONE (DELTASONE) 10 mg tablet 4 tabs daily for 2 days, then 3 tabs daily for 2 days , then 2 tabs daily for 2 days, then 1 tab daily for 2 days    albuterol-ipratropium (DUO-NEB) 2.5 mg-0.5 mg/3 ml nebu 3 mL by Nebulization route every four (4) hours as needed for Wheezing, Shortness of Breath or Respiratory Distress.  acetaminophen (TYLENOL) 325 mg tablet Take 650 mg by mouth every six (6) hours as needed for Pain.  furosemide (LASIX) 40 mg tablet Take 1 Tab by mouth daily. (Patient taking differently: Take 20 mg by mouth daily.  Takes prn when fluid overloaded)    [DISCONTINUED] atorvastatin (LIPITOR) 10 mg tablet Take 10 mg by mouth nightly.  metoprolol (LOPRESSOR) 25 mg tablet Take 25 mg by mouth two (2) times a day. Hard scripts included in transfer packet no    Vaccinations: There is no immunization history on file for this patient. Readmission Risks:    Known Risks: fall risk, cubitus ulcer         The [employer] Index tool is an evidenced based tool that has more automatic generated information. The tool looks at many different items such as the age of the patient, how many times they were admitted in the last calendar year, current length of stay in the hospital and their diagnosis. All of these items are pulled automatically from information documented in the chart from various places and will generate a score that predicts whether a patient is at low (less than 13), medium (13-20) or high (21 or greater) risk of being readmitted.         ASSESSMENT                Temp: 97.2 °F (36.2 °C) (05/17/22 1356) Pulse (Heart Rate): 80 (05/17/22 1356)     Resp Rate: 17 (05/17/22 1356)           BP: (!) 131/47 (05/17/22 1356)     O2 Sat (%): 96 % (05/17/22 1356)     Weight: 135.5 kg (298 lb 11.6 oz)    Height: 5' 3\" (160 cm) (05/11/22 1409)       If above not within 1 hour of discharge:    BP:_____  P:____  R:____ T:_____ O2 Sat: ___%  O2: ______    Active Orders   Diet    ADULT DIET Easy to Chew         Orientation: oriented to time, place, person and situation     Active Behaviors: None                                   Active Lines/Drains:  (Peg Tube / Petersen / CL or S/L?): yes    Urinary Status: External catheter     Last BM: Last Bowel Movement Date: 05/17/22     Skin Integrity: Wound (add Wound LDA) (buttucks, upper legs, thighs)             Mobility: Very limited   Weight Bearing Status: NWB (Non Weight Bearing)                Lab Results   Component Value Date/Time    Glucose 106 (H) 05/16/2022 02:48 AM    Hemoglobin A1c 5.4 04/21/2022 01:10 AM    INR 1.4 (H) 11/24/2020 02:59 AM    INR 1.4 (H) 11/23/2020 03:25 AM    HGB 10.2 (L) 05/14/2022 01:52 AM    HGB 10.7 (L) 05/13/2022 12:28 AM        RECOMMENDATION     See After Visit Summary (AVS) for:  · Discharge instructions  · After [address]   · Special equipment needed (entered pre-discharge by Care Management)  · Medication Reconciliation    · Follow up Appointment(s)         Report given/sent by:  James Kamara                    Verbal report given to:  [facility] LPN FAXED to:          Estimated discharge time:  5/17/2022 at 1700

## 2022-05-20 ENCOUNTER — TELEPHONE (OUTPATIENT)
Dept: FAMILY MEDICINE CLINIC | Age: 62
End: 2022-05-20

## 2022-05-20 NOTE — TELEPHONE ENCOUNTER
YOVANI cefepime  Will list cefepime as an allergy  New antibiotic orders as follows    Daptomycin 8 mg/kg IV every 24 hours  end date 6/16  Weekly CBC, CMP, CK & ESR/CRP every other week fax reports to [phone], call with critical labs at [place] [address] at end of therapy on 6/16  If there is PICC malfunction and PICC nurse cannot reposition please send  Patient to ED right away  No statin while on Daptomycin  Adequate fluids, daily probiotic/yogurt  ID [id number] on 6/30 at 330

## 2022-05-20 NOTE — TELEPHONE ENCOUNTER
Spoke w/ Matthew Damon at [facility]; two patient identifiers confirmed. Matthew Damon states that patient arrived to their facility on 5/17/2022 with order from hospital d/c for cefepime and daptomycin IV antibiotics. Patient started with visible rash on 5/18/2022 and patient stated that she has a hx of rashes r/t her anxiety. Rash was noted to worsen daily and after 0600 dose of cefepime on 5/20/2022 it was noted to be markedly worsened and had spread throughout the patient's body and was itching. Licha Mays (physician at facility) ordered that cefepime be held until ID notified and could advise with how to proceed. Benadryl has been ordered for patient at four times daily scheduled dosing (started on 5/19/2022 to continue through 5/23/2022) and daptomycin has continued to be given as prescribed.     Rena Prieto LPN

## 2022-05-20 NOTE — TELEPHONE ENCOUNTER
Spoke w/ Abi at [facility]; two patient identifiers confirmed. Informed per Dr. Car Shown cefepime  Will list cefepime as an allergy  New antibiotic orders as follows     Daptomycin 8 mg/kg IV every 24 hours  end date 6/16  Weekly CBC, CMP, CK & ESR/CRP every other week fax reports to [phone] with critical labs at [place] [address] at end of therapy on 6/16  If there is PICC malfunction and PICC nurse cannot reposition please send  Patient to ED right away  No statin while on Daptomycin  Adequate fluids, daily probiotic/yogurt  ID [id number] on 6/30 at [facility] verbalized understanding and confirmed with verbal readback.     Marychuy Bradley LPN

## 2022-05-20 NOTE — TELEPHONE ENCOUNTER
Pls call Reji Chavis at [address] [place] [address] patient has had a rash for about 2 days and the doctor there thinks it is a reaction to antibiotic     Pls advise [phone]

## 2022-05-23 ENCOUNTER — APPOINTMENT (OUTPATIENT)
Dept: GENERAL RADIOLOGY | Age: 62
DRG: 189 | End: 2022-05-23
Attending: EMERGENCY MEDICINE
Payer: MEDICARE

## 2022-05-23 ENCOUNTER — APPOINTMENT (OUTPATIENT)
Dept: CT IMAGING | Age: 62
DRG: 189 | End: 2022-05-23
Attending: EMERGENCY MEDICINE
Payer: MEDICARE

## 2022-05-23 ENCOUNTER — HOSPITAL ENCOUNTER (INPATIENT)
Age: 62
LOS: 25 days | Discharge: NURSING, REHAB OR LONG-TERM CARE | DRG: 189 | End: 2022-06-17
Attending: EMERGENCY MEDICINE | Admitting: HOSPITALIST
Payer: MEDICARE

## 2022-05-23 DIAGNOSIS — L27.0 DRUG RASH: ICD-10-CM

## 2022-05-23 DIAGNOSIS — M00.9 PYOGENIC ARTHRITIS OF LEFT HIP, DUE TO UNSPECIFIED ORGANISM (HCC): ICD-10-CM

## 2022-05-23 DIAGNOSIS — I48.0 PAROXYSMAL ATRIAL FIBRILLATION (HCC): ICD-10-CM

## 2022-05-23 DIAGNOSIS — J44.1 COPD EXACERBATION (HCC): ICD-10-CM

## 2022-05-23 DIAGNOSIS — M86.9: ICD-10-CM

## 2022-05-23 DIAGNOSIS — M86.10 ACUTE OSTEOMYELITIS (HCC): ICD-10-CM

## 2022-05-23 DIAGNOSIS — J96.01 ACUTE RESPIRATORY FAILURE WITH HYPOXIA AND HYPERCAPNIA (HCC): Primary | ICD-10-CM

## 2022-05-23 DIAGNOSIS — I48.92 ATRIAL FLUTTER WITH RAPID VENTRICULAR RESPONSE (HCC): ICD-10-CM

## 2022-05-23 DIAGNOSIS — J96.02 ACUTE RESPIRATORY FAILURE WITH HYPOXIA AND HYPERCAPNIA (HCC): Primary | ICD-10-CM

## 2022-05-23 DIAGNOSIS — S72.002K CLOSED FRACTURE OF LEFT HIP WITH NONUNION, SUBSEQUENT ENCOUNTER: ICD-10-CM

## 2022-05-23 DIAGNOSIS — G47.33 OSA (OBSTRUCTIVE SLEEP APNEA): ICD-10-CM

## 2022-05-23 DIAGNOSIS — J96.01 ACUTE RESPIRATORY FAILURE WITH HYPOXIA (HCC): ICD-10-CM

## 2022-05-23 DIAGNOSIS — L53.8 MACULAR ERYTHEMATOUS RASH: ICD-10-CM

## 2022-05-23 DIAGNOSIS — J96.21 ACUTE ON CHRONIC RESPIRATORY FAILURE WITH HYPOXIA AND HYPERCAPNIA (HCC): ICD-10-CM

## 2022-05-23 DIAGNOSIS — J96.22 ACUTE ON CHRONIC RESPIRATORY FAILURE WITH HYPOXIA AND HYPERCAPNIA (HCC): ICD-10-CM

## 2022-05-23 DIAGNOSIS — B37.2 CANDIDAL DERMATITIS: ICD-10-CM

## 2022-05-23 DIAGNOSIS — T14.8XXA DISPLACED FRACTURE: ICD-10-CM

## 2022-05-23 DIAGNOSIS — I50.43 ACUTE ON CHRONIC COMBINED SYSTOLIC AND DIASTOLIC HEART FAILURE (HCC): ICD-10-CM

## 2022-05-23 DIAGNOSIS — M25.552 PAIN IN LEFT HIP: ICD-10-CM

## 2022-05-23 LAB
ALBUMIN SERPL-MCNC: 2.2 G/DL (ref 3.5–5)
ALBUMIN SERPL-MCNC: 2.3 G/DL (ref 3.5–5)
ALBUMIN/GLOB SERPL: 0.4 {RATIO} (ref 1.1–2.2)
ALBUMIN/GLOB SERPL: 0.4 {RATIO} (ref 1.1–2.2)
ALP SERPL-CCNC: 752 U/L (ref 45–117)
ALP SERPL-CCNC: 814 U/L (ref 45–117)
ALT SERPL-CCNC: 27 U/L (ref 12–78)
ALT SERPL-CCNC: 30 U/L (ref 12–78)
ANION GAP SERPL CALC-SCNC: ABNORMAL MMOL/L (ref 5–15)
ANION GAP SERPL CALC-SCNC: ABNORMAL MMOL/L (ref 5–15)
ARTERIAL PATENCY WRIST A: YES
ARTERIAL PATENCY WRIST A: YES
AST SERPL-CCNC: 45 U/L (ref 15–37)
AST SERPL-CCNC: 53 U/L (ref 15–37)
ATRIAL RATE: 97 BPM
BASE EXCESS BLDA CALC-SCNC: 20.2 MMOL/L
BASE EXCESS BLDA CALC-SCNC: 23.2 MMOL/L
BASOPHILS # BLD: 0.1 K/UL (ref 0–0.1)
BASOPHILS NFR BLD: 1 % (ref 0–1)
BDY SITE: ABNORMAL
BDY SITE: ABNORMAL
BILIRUB SERPL-MCNC: 0.5 MG/DL (ref 0.2–1)
BILIRUB SERPL-MCNC: 0.6 MG/DL (ref 0.2–1)
BNP SERPL-MCNC: 927 PG/ML
BUN SERPL-MCNC: 11 MG/DL (ref 6–20)
BUN SERPL-MCNC: 11 MG/DL (ref 6–20)
BUN/CREAT SERPL: 19 (ref 12–20)
BUN/CREAT SERPL: 20 (ref 12–20)
CA-I BLD-MCNC: 1.26 MMOL/L (ref 1.12–1.32)
CALCIUM SERPL-MCNC: 8.5 MG/DL (ref 8.5–10.1)
CALCIUM SERPL-MCNC: 8.9 MG/DL (ref 8.5–10.1)
CALCULATED P AXIS, ECG09: 56 DEGREES
CALCULATED R AXIS, ECG10: 56 DEGREES
CALCULATED T AXIS, ECG11: 85 DEGREES
CHLORIDE BLD-SCNC: 86 MMOL/L (ref 100–108)
CHLORIDE SERPL-SCNC: 89 MMOL/L (ref 97–108)
CHLORIDE SERPL-SCNC: 90 MMOL/L (ref 97–108)
CO2 SERPL-SCNC: >45 MMOL/L (ref 21–32)
CO2 SERPL-SCNC: >45 MMOL/L (ref 21–32)
COVID-19 RAPID TEST, COVR: NOT DETECTED
CREAT SERPL-MCNC: 0.55 MG/DL (ref 0.55–1.02)
CREAT SERPL-MCNC: 0.59 MG/DL (ref 0.55–1.02)
CREAT UR-MCNC: 0.8 MG/DL (ref 0.6–1.3)
DIAGNOSIS, 93000: NORMAL
DIFFERENTIAL METHOD BLD: ABNORMAL
EOSINOPHIL # BLD: 0.3 K/UL (ref 0–0.4)
EOSINOPHIL NFR BLD: 3 % (ref 0–7)
ERYTHROCYTE [DISTWIDTH] IN BLOOD BY AUTOMATED COUNT: 15.3 % (ref 11.5–14.5)
ERYTHROCYTE [DISTWIDTH] IN BLOOD BY AUTOMATED COUNT: 15.6 % (ref 11.5–14.5)
FIO2 ON VENT: 100 %
FIO2 ON VENT: 50 %
FLUAV AG NPH QL IA: NEGATIVE
FLUBV AG NOSE QL IA: NEGATIVE
GLOBULIN SER CALC-MCNC: 5 G/DL (ref 2–4)
GLOBULIN SER CALC-MCNC: 5.4 G/DL (ref 2–4)
GLUCOSE BLD STRIP.AUTO-MCNC: 173 MG/DL (ref 74–106)
GLUCOSE SERPL-MCNC: 162 MG/DL (ref 65–100)
GLUCOSE SERPL-MCNC: 162 MG/DL (ref 65–100)
HCO3 BLDA-SCNC: 56 MMOL/L (ref 22–26)
HCO3 BLDA-SCNC: 60 MMOL/L (ref 22–26)
HCT VFR BLD AUTO: 37.5 % (ref 35–47)
HCT VFR BLD AUTO: 42.4 % (ref 35–47)
HGB BLD-MCNC: 10.4 G/DL (ref 11.5–16)
HGB BLD-MCNC: 11.4 G/DL (ref 11.5–16)
IMM GRANULOCYTES # BLD AUTO: 0.2 K/UL (ref 0–0.04)
IMM GRANULOCYTES NFR BLD AUTO: 2 % (ref 0–0.5)
IPAP/PIP, IPAPIP: 20
LACTATE BLD-SCNC: 1.47 MMOL/L (ref 0.4–2)
LACTATE SERPL-SCNC: 1.3 MMOL/L (ref 0.4–2)
LYMPHOCYTES # BLD: 1.8 K/UL (ref 0.8–3.5)
LYMPHOCYTES NFR BLD: 18 % (ref 12–49)
MAGNESIUM SERPL-MCNC: 2 MG/DL (ref 1.6–2.4)
MCH RBC QN AUTO: 30.2 PG (ref 26–34)
MCH RBC QN AUTO: 30.5 PG (ref 26–34)
MCHC RBC AUTO-ENTMCNC: 26.9 G/DL (ref 30–36.5)
MCHC RBC AUTO-ENTMCNC: 27.7 G/DL (ref 30–36.5)
MCV RBC AUTO: 110 FL (ref 80–99)
MCV RBC AUTO: 112.2 FL (ref 80–99)
MONOCYTES # BLD: 0.8 K/UL (ref 0–1)
MONOCYTES NFR BLD: 8 % (ref 5–13)
NEUTS SEG # BLD: 6.6 K/UL (ref 1.8–8)
NEUTS SEG NFR BLD: 68 % (ref 32–75)
NRBC # BLD: 0 K/UL (ref 0–0.01)
NRBC # BLD: 0.02 K/UL (ref 0–0.01)
NRBC BLD-RTO: 0 PER 100 WBC
NRBC BLD-RTO: 0.2 PER 100 WBC
P-R INTERVAL, ECG05: 162 MS
PCO2 BLDA: 146 MMHG (ref 35–45)
PCO2 BLDA: 150 MMHG (ref 35–45)
PCO2 BLDV: >110 MMHG (ref 41–51)
PEEP RESPIRATORY: 6 CM[H2O]
PH BLDA: 7.2 [PH] (ref 7.35–7.45)
PH BLDA: 7.22 [PH] (ref 7.35–7.45)
PH BLDV: 7.2 [PH] (ref 7.32–7.42)
PHOSPHATE SERPL-MCNC: 2.9 MG/DL (ref 2.6–4.7)
PLATELET # BLD AUTO: 184 K/UL (ref 150–400)
PLATELET # BLD AUTO: 233 K/UL (ref 150–400)
PMV BLD AUTO: 11 FL (ref 8.9–12.9)
PMV BLD AUTO: 11.5 FL (ref 8.9–12.9)
PO2 BLDA: 126 MMHG (ref 80–100)
PO2 BLDA: 68 MMHG (ref 80–100)
PO2 BLDV: 38 MMHG (ref 25–40)
POTASSIUM BLD-SCNC: 4.4 MMOL/L (ref 3.5–5.5)
POTASSIUM SERPL-SCNC: 3.8 MMOL/L (ref 3.5–5.1)
POTASSIUM SERPL-SCNC: 3.8 MMOL/L (ref 3.5–5.1)
PRESSURE SUPPORT SETTING VENT: 20 CM[H2O]
PROT SERPL-MCNC: 7.2 G/DL (ref 6.4–8.2)
PROT SERPL-MCNC: 7.7 G/DL (ref 6.4–8.2)
Q-T INTERVAL, ECG07: 368 MS
QRS DURATION, ECG06: 88 MS
QTC CALCULATION (BEZET), ECG08: 467 MS
RBC # BLD AUTO: 3.41 M/UL (ref 3.8–5.2)
RBC # BLD AUTO: 3.78 M/UL (ref 3.8–5.2)
RBC MORPH BLD: ABNORMAL
RBC MORPH BLD: ABNORMAL
SAO2 % BLD: 87 % (ref 92–97)
SAO2 % BLD: 97 % (ref 92–97)
SAO2% DEVICE SAO2% SENSOR NAME: ABNORMAL
SAO2% DEVICE SAO2% SENSOR NAME: ABNORMAL
SERVICE CMNT-IMP: ABNORMAL
SERVICE CMNT-IMP: ABNORMAL
SODIUM BLD-SCNC: 143 MMOL/L (ref 136–145)
SODIUM SERPL-SCNC: 138 MMOL/L (ref 136–145)
SODIUM SERPL-SCNC: 138 MMOL/L (ref 136–145)
SOURCE, COVRS: NORMAL
SPECIMEN SITE: ABNORMAL
TROPONIN-HIGH SENSITIVITY: 42 NG/L (ref 0–51)
VENTRICULAR RATE, ECG03: 97 BPM
WBC # BLD AUTO: 7.3 K/UL (ref 3.6–11)
WBC # BLD AUTO: 9.8 K/UL (ref 3.6–11)

## 2022-05-23 PROCEDURE — 74011000258 HC RX REV CODE- 258: Performed by: HOSPITALIST

## 2022-05-23 PROCEDURE — 85027 COMPLETE CBC AUTOMATED: CPT

## 2022-05-23 PROCEDURE — 74011000250 HC RX REV CODE- 250: Performed by: HOSPITALIST

## 2022-05-23 PROCEDURE — 74011250636 HC RX REV CODE- 250/636: Performed by: NURSE PRACTITIONER

## 2022-05-23 PROCEDURE — 77030005513 HC CATH URETH FOL11 MDII -B

## 2022-05-23 PROCEDURE — 82803 BLOOD GASES ANY COMBINATION: CPT

## 2022-05-23 PROCEDURE — 83880 ASSAY OF NATRIURETIC PEPTIDE: CPT

## 2022-05-23 PROCEDURE — 99285 EMERGENCY DEPT VISIT HI MDM: CPT

## 2022-05-23 PROCEDURE — 96374 THER/PROPH/DIAG INJ IV PUSH: CPT

## 2022-05-23 PROCEDURE — 87804 INFLUENZA ASSAY W/OPTIC: CPT

## 2022-05-23 PROCEDURE — 74011000250 HC RX REV CODE- 250: Performed by: EMERGENCY MEDICINE

## 2022-05-23 PROCEDURE — 74011000250 HC RX REV CODE- 250: Performed by: NURSE PRACTITIONER

## 2022-05-23 PROCEDURE — 87040 BLOOD CULTURE FOR BACTERIA: CPT

## 2022-05-23 PROCEDURE — 94002 VENT MGMT INPAT INIT DAY: CPT

## 2022-05-23 PROCEDURE — 93005 ELECTROCARDIOGRAM TRACING: CPT

## 2022-05-23 PROCEDURE — 85025 COMPLETE CBC W/AUTO DIFF WBC: CPT

## 2022-05-23 PROCEDURE — 36415 COLL VENOUS BLD VENIPUNCTURE: CPT

## 2022-05-23 PROCEDURE — 83735 ASSAY OF MAGNESIUM: CPT

## 2022-05-23 PROCEDURE — 74011250636 HC RX REV CODE- 250/636: Performed by: EMERGENCY MEDICINE

## 2022-05-23 PROCEDURE — 36600 WITHDRAWAL OF ARTERIAL BLOOD: CPT

## 2022-05-23 PROCEDURE — 82947 ASSAY GLUCOSE BLOOD QUANT: CPT

## 2022-05-23 PROCEDURE — 70450 CT HEAD/BRAIN W/O DYE: CPT

## 2022-05-23 PROCEDURE — 87635 SARS-COV-2 COVID-19 AMP PRB: CPT

## 2022-05-23 PROCEDURE — 94003 VENT MGMT INPAT SUBQ DAY: CPT

## 2022-05-23 PROCEDURE — 83605 ASSAY OF LACTIC ACID: CPT

## 2022-05-23 PROCEDURE — 71045 X-RAY EXAM CHEST 1 VIEW: CPT

## 2022-05-23 PROCEDURE — 84484 ASSAY OF TROPONIN QUANT: CPT

## 2022-05-23 PROCEDURE — 80053 COMPREHEN METABOLIC PANEL: CPT

## 2022-05-23 PROCEDURE — 84100 ASSAY OF PHOSPHORUS: CPT

## 2022-05-23 PROCEDURE — 94640 AIRWAY INHALATION TREATMENT: CPT

## 2022-05-23 PROCEDURE — 65610000006 HC RM INTENSIVE CARE

## 2022-05-23 PROCEDURE — 74011250637 HC RX REV CODE- 250/637: Performed by: NURSE PRACTITIONER

## 2022-05-23 PROCEDURE — 74011250636 HC RX REV CODE- 250/636: Performed by: HOSPITALIST

## 2022-05-23 RX ORDER — FUROSEMIDE 10 MG/ML
40 INJECTION INTRAMUSCULAR; INTRAVENOUS ONCE
Status: COMPLETED | OUTPATIENT
Start: 2022-05-23 | End: 2022-05-23

## 2022-05-23 RX ORDER — ACETAMINOPHEN 325 MG/1
650 TABLET ORAL
Status: DISCONTINUED | OUTPATIENT
Start: 2022-05-23 | End: 2022-06-17 | Stop reason: HOSPADM

## 2022-05-23 RX ORDER — NYSTATIN 100000 [USP'U]/G
POWDER TOPICAL 2 TIMES DAILY
Status: DISCONTINUED | OUTPATIENT
Start: 2022-05-23 | End: 2022-05-26 | Stop reason: ALTCHOICE

## 2022-05-23 RX ORDER — IPRATROPIUM BROMIDE AND ALBUTEROL SULFATE 2.5; .5 MG/3ML; MG/3ML
3 SOLUTION RESPIRATORY (INHALATION)
Status: DISCONTINUED | OUTPATIENT
Start: 2022-05-23 | End: 2022-05-27

## 2022-05-23 RX ORDER — IPRATROPIUM BROMIDE AND ALBUTEROL SULFATE 2.5; .5 MG/3ML; MG/3ML
3 SOLUTION RESPIRATORY (INHALATION)
Status: DISCONTINUED | OUTPATIENT
Start: 2022-05-23 | End: 2022-06-01 | Stop reason: SDUPTHER

## 2022-05-23 RX ORDER — SODIUM CHLORIDE 0.9 % (FLUSH) 0.9 %
5-40 SYRINGE (ML) INJECTION EVERY 8 HOURS
Status: DISCONTINUED | OUTPATIENT
Start: 2022-05-23 | End: 2022-06-03

## 2022-05-23 RX ORDER — ACETAMINOPHEN 650 MG/1
650 SUPPOSITORY RECTAL
Status: DISCONTINUED | OUTPATIENT
Start: 2022-05-23 | End: 2022-06-17 | Stop reason: HOSPADM

## 2022-05-23 RX ORDER — FUROSEMIDE 10 MG/ML
40 INJECTION INTRAMUSCULAR; INTRAVENOUS EVERY 12 HOURS
Status: COMPLETED | OUTPATIENT
Start: 2022-05-24 | End: 2022-05-25

## 2022-05-23 RX ORDER — ALBUTEROL SULFATE 2.5 MG/.5ML
10 SOLUTION RESPIRATORY (INHALATION)
Status: COMPLETED | OUTPATIENT
Start: 2022-05-23 | End: 2022-05-23

## 2022-05-23 RX ORDER — POLYETHYLENE GLYCOL 3350 17 G/17G
17 POWDER, FOR SOLUTION ORAL DAILY PRN
Status: DISCONTINUED | OUTPATIENT
Start: 2022-05-23 | End: 2022-06-17 | Stop reason: HOSPADM

## 2022-05-23 RX ORDER — SODIUM CHLORIDE 0.9 % (FLUSH) 0.9 %
5-40 SYRINGE (ML) INJECTION AS NEEDED
Status: DISCONTINUED | OUTPATIENT
Start: 2022-05-23 | End: 2022-06-03

## 2022-05-23 RX ORDER — AMIODARONE HYDROCHLORIDE 200 MG/1
200 TABLET ORAL DAILY
Status: DISCONTINUED | OUTPATIENT
Start: 2022-05-24 | End: 2022-06-05

## 2022-05-23 RX ORDER — GABAPENTIN 100 MG/1
100 CAPSULE ORAL 3 TIMES DAILY
Status: DISCONTINUED | OUTPATIENT
Start: 2022-05-23 | End: 2022-06-17 | Stop reason: HOSPADM

## 2022-05-23 RX ORDER — ONDANSETRON 2 MG/ML
4 INJECTION INTRAMUSCULAR; INTRAVENOUS
Status: DISCONTINUED | OUTPATIENT
Start: 2022-05-23 | End: 2022-06-17 | Stop reason: HOSPADM

## 2022-05-23 RX ORDER — IPRATROPIUM BROMIDE 0.5 MG/2.5ML
0.5 SOLUTION RESPIRATORY (INHALATION)
Status: COMPLETED | OUTPATIENT
Start: 2022-05-23 | End: 2022-05-23

## 2022-05-23 RX ORDER — ONDANSETRON 4 MG/1
4 TABLET, ORALLY DISINTEGRATING ORAL
Status: DISCONTINUED | OUTPATIENT
Start: 2022-05-23 | End: 2022-06-17 | Stop reason: HOSPADM

## 2022-05-23 RX ADMIN — ALBUTEROL SULFATE 10 MG: 2.5 SOLUTION RESPIRATORY (INHALATION) at 14:41

## 2022-05-23 RX ADMIN — IPRATROPIUM BROMIDE AND ALBUTEROL SULFATE 3 ML: .5; 3 SOLUTION RESPIRATORY (INHALATION) at 23:31

## 2022-05-23 RX ADMIN — FUROSEMIDE 40 MG: 10 INJECTION, SOLUTION INTRAMUSCULAR; INTRAVENOUS at 21:04

## 2022-05-23 RX ADMIN — METHYLPREDNISOLONE SODIUM SUCCINATE 60 MG: 125 INJECTION, POWDER, FOR SOLUTION INTRAMUSCULAR; INTRAVENOUS at 22:14

## 2022-05-23 RX ADMIN — NYSTATIN: 100000 POWDER TOPICAL at 22:14

## 2022-05-23 RX ADMIN — SODIUM CHLORIDE, PRESERVATIVE FREE 10 ML: 5 INJECTION INTRAVENOUS at 22:14

## 2022-05-23 RX ADMIN — METHYLPREDNISOLONE SODIUM SUCCINATE 125 MG: 125 INJECTION, POWDER, FOR SOLUTION INTRAMUSCULAR; INTRAVENOUS at 14:37

## 2022-05-23 RX ADMIN — IPRATROPIUM BROMIDE 0.5 MG: 0.5 SOLUTION RESPIRATORY (INHALATION) at 14:41

## 2022-05-23 RX ADMIN — PIPERACILLIN AND TAZOBACTAM 3.38 G: 3; .375 INJECTION, POWDER, LYOPHILIZED, FOR SOLUTION INTRAVENOUS at 23:21

## 2022-05-23 NOTE — ED PROVIDER NOTES
EMERGENCY DEPARTMENT HISTORY AND PHYSICAL EXAM      Date: 5/23/2022  Patient Name: Philip Holm    History of Presenting Illness     Chief Complaint   Patient presents with    Altered mental status     hypoxic, recent osteo       History Provided By: Patient and EMS    HPI: Philip Holm, 57 y.o. female with history of hypertension, diabetes, CHF, COPD, atrial fibrillation on chronic Xarelto anticoagulation, CAD, presents to the ED with cc of hypoxia. Patient arrives from skilled nursing facility by EMS. She noted to be hypoxic by SNF staff this morning in the low 80s on her chronic 3 L nasal cannula, they bumped up to 5 L and noted no improvement so they called EMS. EMS bumped up to 6 L without improvement in her hypoxia and started her on a nonrebreather. Patient has no complaints of shortness of breath or cough but she does appear encephalopathic. Unclear what her baseline mental status is. Patient has no complaints upon arrival except for bilateral lower extremity pain. No reports from facility of fever or recent illness. Patient is not oriented to place, month, or setting and is not a reliable historian at this time. Chart review demonstrates multiple recent admissions including most recently for left hip osteomyelitis and sepsis. She has an indwelling PICC line and medication sheet reflects both daptomycin and cefepime currently being administered as well as Xarelto at nursing facility. There are no other complaints, changes, or physical findings at this time. PCP: Unknown, Provider, MD    No current facility-administered medications on file prior to encounter. Current Outpatient Medications on File Prior to Encounter   Medication Sig Dispense Refill    rivaroxaban (XARELTO) 20 mg tab tablet Take 1 Tablet by mouth daily for 30 days. 30 Tablet 1    L.acid,para-B. bifidum-S.therm (RISAQUAD) 8 billion cell cap cap Take 1 Capsule by mouth daily for 36 days.  30 Capsule 1    [START ON 6/17/2022] atorvastatin (LIPITOR) 10 mg tablet Take 1 Tablet by mouth nightly. START AFTER COMPLETING ANTIBIOTICS TREATMENT ON 6/17 30 Tablet 0    amiodarone (CORDARONE) 200 mg tablet Take 1 Tablet by mouth daily for 30 days. 30 Tablet 0    cefepime 2 gram 2 g IVPB 2 g by IntraVENous route every eight (8) hours for 30 days. 90 Dose 0    DAPTOmycin (CUBICIN) IVPB 8 mg/kg daily 30 Dose 0    folic acid (FOLVITE) 1 mg tablet Take 1 Tablet by mouth daily for 30 days. 30 Tablet 1    gabapentin (NEURONTIN) 100 mg capsule Take 1 Capsule by mouth three (3) times daily for 30 days. Max Daily Amount: 300 mg. 90 Capsule 1    albuterol-ipratropium (DUO-NEB) 2.5 mg-0.5 mg/3 ml nebu 3 mL by Nebulization route every four (4) hours as needed for Wheezing, Shortness of Breath or Respiratory Distress. 100 Nebule 0    acetaminophen (TYLENOL) 325 mg tablet Take 650 mg by mouth every six (6) hours as needed for Pain.  furosemide (LASIX) 40 mg tablet Take 1 Tab by mouth daily. (Patient taking differently: Take 20 mg by mouth daily. Takes prn when fluid overloaded) 30 Tab 0    metoprolol (LOPRESSOR) 25 mg tablet Take 25 mg by mouth two (2) times a day.          Past History     Past Medical History:  Past Medical History:   Diagnosis Date    Acute on chronic respiratory failure with hypoxia and hypercapnia ([address] 11/20/2020    Atrial fibrillation ([address] [address] 11/4/2020    CH[facility]congestive [facility] failure) [facility]     Diabetes ([address]     Hypertension     Ill-defined condition     high cholesterol    Ill-defined condition     tachycardia    JOSLYN (obstructive sleep apnea) 11/20/2020    Other and unspecified symptoms and signs involving general sensations and perceptions     ruptured disc    S/P cardiac cath 11/4/2020    11/3/2020 nonobstructive disease       Past Surgical History:  Past Surgical History:   Procedure Laterality Date    PA COMPRE ELECTROPHYSIOL XM W/LEFT ATRIAL PACNG/REC N/A 11/23/2020    Lt Atrial Pace & Record During Ep Study performed by Deena Clinton MD at [facility] CARDIAC CATH LAB    MO COMPRE EP EVAL ABLTJ 3D MAPG TX SVT N/A 11/23/2020    ABLATION A-FLUTTER performed by Deena Clinton MD at [facility] CARDIAC CATH LAB    MO ICAR CATHETER ABLATION ARRHYTHMIA ADD ON N/A 11/23/2020    Ablation Svt/Vt Add On performed by Deena Clinton MD at [facility] CARDIAC CATH LAB    MO INTRACARDIAC ELECTROPHYSIOLOGIC 3D MAPPING N/A 11/23/2020    Ep 3d Mapping performed by Deena Clinton MD at [facility] CARDIAC CATH LAB    MO STIM/PACING HEART POST IV DRUG INFU N/A 11/23/2020    Drug Stimulation performed by Deena Clinton MD at [facility] CARDIAC CATH LAB       Family History:  No family history on file. Social History:  Social History     Tobacco Use    Smoking status: Former Smoker    Smokeless tobacco: Never Used   Substance Use Topics    Alcohol use: No    Drug use: No       Allergies: Allergies   Allergen Reactions    Cefepime Rash    Cefazolin Rash    Other Plant, Animal, Environmental Rash     Ivory soap caused rash on hands. Review of Systems   Review of Systems   Unable to perform ROS: Mental status change       Physical Exam   Physical Exam  Vitals and nursing note reviewed. Constitutional:       General: She is not in acute distress. Appearance: Normal appearance. She is obese. She is ill-appearing. Comments: Appears chronically ill, lying in bed in no acute distress. HENT:      Head: Normocephalic and atraumatic. Nose: Nose normal.      Mouth/Throat:      Mouth: Mucous membranes are moist.   Eyes:      Extraocular Movements: Extraocular movements intact. Pupils: Pupils are equal, round, and reactive to light. Cardiovascular:      Rate and Rhythm: Normal rate and regular rhythm. Heart sounds: No murmur heard. Pulmonary:      Effort: Pulmonary effort is normal. No respiratory distress.       Comments: Diminished throughout  Abdominal:      General: There is no distension. Palpations: Abdomen is soft. Tenderness: There is no abdominal tenderness. There is no guarding or rebound. Musculoskeletal:         General: No swelling or tenderness. Normal range of motion. Cervical back: Normal range of motion and neck supple. Right lower leg: No edema. Left lower leg: No edema. Comments: Left upper extremity PICC line noted. Bilateral lower extremities with chronic lymphedema. There is warmth and erythema with significant blanching throughout entirety of the bilateral lower extremities. Skin:     General: Skin is warm and dry. Coloration: Skin is not pale. Findings: Erythema present. Neurological:      General: No focal deficit present. Mental Status: She is alert. She is disoriented. Diagnostic Study Results     Labs -     Recent Results (from the past 12 hour(s))   EKG, 12 LEAD, INITIAL    Collection Time: 05/23/22  2:10 PM   Result Value Ref Range    Ventricular Rate 97 BPM    Atrial Rate 97 BPM    P-R Interval 162 ms    QRS Duration 88 ms    Q-T Interval 368 ms    QTC Calculation (Bezet) 467 ms    Calculated P Axis 56 degrees    Calculated R Axis 56 degrees    Calculated T Axis 85 degrees    Diagnosis       Normal sinus rhythm  Inferior infarct , age undetermined     Confirmed by Georgie Osborn M.D. ([id number]) on 5/23/2022 3:30:55 PM     BLOOD GAS, ARTERIAL    Collection Time: 05/23/22  2:13 PM   Result Value Ref Range    pH 7.20 (LL) 7.35 - 7.45      PCO2 146 (H) 35 - 45 mmHg    PO2 126 (H) 80 - 100 mmHg    O2 SAT 97 92 - 97 %    BICARBONATE 56 (H) 22 - 26 mmol/L    BASE EXCESS 20.2 mmol/L    O2 METHOD NONREBREATHER MASK      FIO2 100 %    Sample source ARTERIAL      SITE LEFT RADIAL      ELIA'S TEST YES      Critical value read back Called to MISTI Cisneros MD on 05/23/2022 at 14:15    BLOOD GAS,CHEM8,LACTIC ACID POC    Collection Time: 05/23/22  2:42 PM   Result Value Ref Range    Calcium, ionized (POC) 1.26 1.12 - 1.32 mmol/L    Sample source VENOUS BLOOD      Sodium,  136 - 145 MMOL/L    Potassium, POC 4.4 3.5 - 5.5 MMOL/L    Chloride, POC 86 (L) 100 - 108 MMOL/L    Glucose,  (H) 74 - 106 MG/DL    Creatinine, POC 0.8 0.6 - 1.3 MG/DL    Lactic Acid (POC) 1.47 0.40 - 2.00 mmol/L    pH, venous (POC) 7.20 (L) 7.32 - 7.42      pCO2, venous (POC) >110.0 (H) 41 - 51 MMHG    pO2, venous (POC) 38 25 - 40 mmHg   CBC WITH AUTOMATED DIFF    Collection Time: 05/23/22  2:47 PM   Result Value Ref Range    WBC 9.8 3.6 - 11.0 K/uL    RBC 3.78 (L) 3.80 - 5.20 M/uL    HGB 11.4 (L) 11.5 - 16.0 g/dL    HCT 42.4 35.0 - 47.0 %    .2 (H) 80.0 - 99.0 FL    MCH 30.2 26.0 - 34.0 PG    MCHC 26.9 (L) 30.0 - 36.5 g/dL    RDW 15.6 (H) 11.5 - 14.5 %    PLATELET 061 294 - 713 K/uL    MPV 11.5 8.9 - 12.9 FL    NRBC 0.2 (H) 0  WBC    ABSOLUTE NRBC 0.02 (H) 0.00 - 0.01 K/uL    NEUTROPHILS 68 32 - 75 %    LYMPHOCYTES 18 12 - 49 %    MONOCYTES 8 5 - 13 %    EOSINOPHILS 3 0 - 7 %    BASOPHILS 1 0 - 1 %    IMMATURE GRANULOCYTES 2 (H) 0.0 - 0.5 %    ABS. NEUTROPHILS 6.6 1.8 - 8.0 K/UL    ABS. LYMPHOCYTES 1.8 0.8 - 3.5 K/UL    ABS. MONOCYTES 0.8 0.0 - 1.0 K/UL    ABS. EOSINOPHILS 0.3 0.0 - 0.4 K/UL    ABS. BASOPHILS 0.1 0.0 - 0.1 K/UL    ABS. IMM.  GRANS. 0.2 (H) 0.00 - 0.04 K/UL    DF SMEAR SCANNED      RBC COMMENTS MACROCYTOSIS  2+        RBC COMMENTS HYPOCHROMIA  1+       METABOLIC PANEL, COMPREHENSIVE    Collection Time: 05/23/22  2:47 PM   Result Value Ref Range    Sodium 138 136 - 145 mmol/L    Potassium 3.8 3.5 - 5.1 mmol/L    Chloride 90 (L) 97 - 108 mmol/L    CO2 >45 (HH) 21 - 32 mmol/L    Anion gap Cannot be calculated 5 - 15 mmol/L    Glucose 162 (H) 65 - 100 mg/dL    BUN 11 6 - 20 MG/DL    Creatinine 0.59 0.55 - 1.02 MG/DL    BUN/Creatinine ratio 19 12 - 20      GFR est AA >60 >60 ml/min/1.73m2    GFR est non-AA >60 >60 ml/min/1.73m2    Calcium 8.9 8.5 - 10.1 MG/DL    Bilirubin, total 0.6 0.2 - 1.0 MG/DL    ALT (SGPT) 30 12 - 78 U/L AST (SGOT) 53 (H) 15 - 37 U/L    Alk. phosphatase 814 (H) 45 - 117 U/L    Protein, total 7.7 6.4 - 8.2 g/dL    Albumin 2.3 (L) 3.5 - 5.0 g/dL    Globulin 5.4 (H) 2.0 - 4.0 g/dL    A-G Ratio 0.4 (L) 1.1 - 2.2     NT-PRO BNP    Collection Time: 05/23/22  2:47 PM   Result Value Ref Range    NT pro- (H) <125 PG/ML   TROPONIN-HIGH SENSITIVITY    Collection Time: 05/23/22  2:47 PM   Result Value Ref Range    Troponin-High Sensitivity 42 0 - 51 ng/L   COVID-19 RAPID TEST    Collection Time: 05/23/22  2:47 PM   Result Value Ref Range    Specimen source Nasopharyngeal      COVID-19 rapid test Not detected NOTD     INFLUENZA A+B VIRAL AGS    Collection Time: 05/23/22  2:47 PM   Result Value Ref Range    Influenza A Antigen Negative NEG      Influenza B Antigen Negative NEG     BLOOD GAS, ARTERIAL    Collection Time: 05/23/22  4:50 PM   Result Value Ref Range    pH 7.22 (LL) 7.35 - 7.45      PCO2 150 (H) 35 - 45 mmHg    PO2 68 (L) 80 - 100 mmHg    O2 SAT 87 (L) 92 - 97 %    BICARBONATE 60 (H) 22 - 26 mmol/L    BASE EXCESS 23.2 mmol/L    O2 METHOD VENT      FIO2 50 %    PRESSURE SUPPORT 20      IPAP/PIP 20      PEEP/CPAP 6.0      Sample source ARTERIAL      SITE LEFT RADIAL      ELIA'S TEST YES      Critical value read back Called to MISTI Armenta MD on 05/23/2022 at 16:54    LACTIC ACID    Collection Time: 05/23/22  7:34 PM   Result Value Ref Range    Lactic acid 1.3 0.4 - 2.0 MMOL/L   CBC W/O DIFF    Collection Time: 05/23/22  7:34 PM   Result Value Ref Range    WBC 7.3 3.6 - 11.0 K/uL    RBC 3.41 (L) 3.80 - 5.20 M/uL    HGB 10.4 (L) 11.5 - 16.0 g/dL    HCT 37.5 35.0 - 47.0 %    .0 (H) 80.0 - 99.0 FL    MCH 30.5 26.0 - 34.0 PG    MCHC 27.7 (L) 30.0 - 36.5 g/dL    RDW 15.3 (H) 11.5 - 14.5 %    PLATELET 069 571 - 642 K/uL    MPV 11.0 8.9 - 12.9 FL    NRBC 0.0 0  WBC    ABSOLUTE NRBC 0.00 0.00 - 5.89 K/uL   METABOLIC PANEL, COMPREHENSIVE    Collection Time: 05/23/22  7:34 PM   Result Value Ref Range    Sodium 138 136 - 145 mmol/L    Potassium 3.8 3.5 - 5.1 mmol/L    Chloride 89 (L) 97 - 108 mmol/L    CO2 >45 (HH) 21 - 32 mmol/L    Anion gap Cannot be calculated 5 - 15 mmol/L    Glucose 162 (H) 65 - 100 mg/dL    BUN 11 6 - 20 MG/DL    Creatinine 0.55 0.55 - 1.02 MG/DL    BUN/Creatinine ratio 20 12 - 20      GFR est AA >60 >60 ml/min/1.73m2    GFR est non-AA >60 >60 ml/min/1.73m2    Calcium 8.5 8.5 - 10.1 MG/DL    Bilirubin, total 0.5 0.2 - 1.0 MG/DL    ALT (SGPT) 27 12 - 78 U/L    AST (SGOT) 45 (H) 15 - 37 U/L    Alk. phosphatase 752 (H) 45 - 117 U/L    Protein, total 7.2 6.4 - 8.2 g/dL    Albumin 2.2 (L) 3.5 - 5.0 g/dL    Globulin 5.0 (H) 2.0 - 4.0 g/dL    A-G Ratio 0.4 (L) 1.1 - 2.2     MAGNESIUM    Collection Time: 05/23/22  7:34 PM   Result Value Ref Range    Magnesium 2.0 1.6 - 2.4 mg/dL   PHOSPHORUS    Collection Time: 05/23/22  7:34 PM   Result Value Ref Range    Phosphorus 2.9 2.6 - 4.7 MG/DL       Radiologic Studies -   CT HEAD WO CONT   Final Result   No evidence of acute process. XR CHEST PORT   Final Result   Unchanged cardiomegaly and pulmonary vasculature engorgement. IR THORACENTESIS CATH W IMAGE    (Results Pending)     CT Results  (Last 48 hours)               05/23/22 1609  CT HEAD WO CONT Final result    Impression:  No evidence of acute process. Narrative:  EXAM: CT HEAD WO CONT       INDICATION: 8/3/2020       COMPARISON: None. CONTRAST: None. TECHNIQUE: Unenhanced CT of the head was performed using 5 mm images. Brain and   bone windows were generated. Coronal and sagittal reformats. CT dose reduction   was achieved through use of a standardized protocol tailored for this   examination and automatic exposure control for dose modulation. FINDINGS:   The ventricles and sulci are normal in size, shape and configuration. . There is   moderate white matter disease likely to chronic small vessel ischemic disease. .   There is no intracranial hemorrhage, extra-axial collection, or mass effect. The   basilar cisterns are open. No CT evidence of acute infarct. The bone windows demonstrate no abnormalities. The visualized portions of the   paranasal sinuses and mastoid air cells are clear. CXR Results  (Last 48 hours)               05/23/22 1540  XR CHEST PORT Final result    Impression:  Unchanged cardiomegaly and pulmonary vasculature engorgement. Narrative:  EXAM: XR CHEST PORT       HISTORY: sob. COMPARISON: 5/3/2022       FINDINGS: Single view(s) of the chest. Areas of scarring are seen in the mid   right chest. No focal consolidation, pleural effusion, or pneumothorax. The   heart is enlarged, but unchanged. There is unchanged pulmonary vasculature   engorgement. . The visualized osseous structures are unremarkable. Medical Decision Making   I am the first provider for this patient. I reviewed the vital signs, available nursing notes, past medical history, past surgical history, family history and social history. Vital Signs-Reviewed the patient's vital signs. Patient Vitals for the past 12 hrs:   Temp Pulse Resp BP SpO2   05/23/22 2145 -- 86 25 -- 97 %   05/23/22 2130 -- 75 23 (!) 112/59 97 %   05/23/22 2104 -- 76 -- 139/74 --   05/23/22 2100 -- 83 19 (!) 174/128 97 %   05/23/22 2045 97.4 °F (36.3 °C) 77 21 -- 94 %   05/23/22 1927 -- 77 24 (!) 115/55 96 %   05/23/22 1924 97.1 °F (36.2 °C) 87 24 139/74 91 %   05/23/22 1917 -- 81 24 134/62 97 %   05/23/22 1913 -- -- -- -- 94 %   05/23/22 1902 -- 85 23 132/68 97 %   05/23/22 1857 -- 88 22 (!) 153/138 (!) 71 %   05/23/22 1707 -- 67 (!) 32 -- 93 %   05/23/22 1442 98.7 °F (37.1 °C) 91 14 132/72 97 %       Records Reviewed: Nursing Notes, Old Medical Records, Previous Radiology Studies and Previous Laboratory Studies  I personally reviewed discharge summary records from 5/30/2022.     Provider Notes (Medical Decision Making):   69-year-old female with above history presenting from skilled nursing facility for hypoxia. She is in no acute respiratory distress and has no complaints. She arrives on nonrebreather facemask administered by EMS for persistent hypoxia despite 6 L nasal cannula. She has no complaints but she appears encephalopathic and is not awake alert and oriented completely. Unclear what her baseline mental status is. Consider infection, hypercapnia, pulmonary edema, pleural effusion, pneumothorax, ACS, PE less likely due to chronic anticoagulation use. Patient severely acidotic and severely hypercapnic. BiPAP initiated. I will repeat ABG, treat with bronchodilators and steroids, and discuss with ICU. ED Course:   Initial assessment performed. The patients presenting problems have been discussed, and they are in agreement with the care plan formulated and outlined with them. I have encouraged them to ask questions as they arise throughout their visit. ED Course as of 05/23/22 2314   Mon May 23, 2022   1802 Intensivist at bedside has evaluated patient, they will admit to the ICU. Agrees with plan to keep on BiPAP and not to electively intubate patient at this time. [AK]      ED Course User Index  [AK] Aristides Perea MD         Cardiac Monitoring: The cardiac monitor revealed the following rhythm as interpreted by me: Normal Sinus Rhythm, rate 85 bpm  The cardiac monitor was ordered secondary to the patient's reported complaint of shortness of breath and to monitor the patient for dysrhythmia. Lucero Nielsen MD      Admission Note:  Patient is being admitted to the hospital by Dr. Donell Steele, Service: [facility].  The results of their tests and reasons for their admission have been discussed with them and available family. They convey agreement and understanding for the need to be admitted and for their admission diagnosis.        Critical Care Documentation  I have spent 89 minutes of critical care time in evaluating and treating this patient. This includes time spent at bedside, time with family and decision makers, documentation, review of labs and imaging, and/or consultation with specialists. It does not include time spent on separately billed procedures. This patient presents with a critical illness or injury that acutely impairs one or more vital organ systems such that there is a high probability of imminent or life threatening deterioration in the patient's condition. This case involved decision making of high complexity to assess, manipulate, and support vital organ system failure and/or to prevent further life threatening deterioration of the patient's condition. Failure to initiate these interventions on an urgent basis would likely result in sudden, clinically significant or life threatening deterioration in the patient's condition. This case required my direct attention, intervention, and personal management for the time documented above. This time does not include separately billed interventions/procedures which are separately documented. Abnormal findings supporting critical care: Acute hypoxic and hypercapnic respiratory failure resulting in encephalopathy and acidosis  Interventions to support critical care: Initiation of BiPAP and titration of BiPAP settings, interpretation of ABGs, consideration of intubation, frequent respiratory reassessment and airway reassessment, consultation with ICU specialist  Failure to intervene may result in: Worsening hypoxia, worsening hypercapnia, CNS depression, respiratory failure, loss of airway, death  Naima Johansen MD      Disposition:  Admit to ICU    Diagnosis     Clinical Impression:   1. Acute respiratory failure with hypoxia and hypercapnia (HCC)        Attestations:  I am the first and primary provider of record for this patient's ED encounter. I personally performed the services described above in this documentation.   Naima Johansen MD    Please note that this dictation was completed with Dragon, the computer voice recognition software. Quite often unanticipated grammatical, syntax, homophones, and other interpretive errors are inadvertently transcribed by the computer software. Please disregard these errors. Please excuse any errors that have escaped final proofreading. Thank you.

## 2022-05-23 NOTE — ED NOTES
Pharmacy called asking about last dose of daptomycin and cefepime. Called [facility] and spoke to [place], [place] who states that the last dose of dapto was at 1130 today and that the cefepime was discontinued due to a rash. Called pharmacy an informed them.

## 2022-05-23 NOTE — ED NOTES
Patient accidentally disconnected bipap and dropped to 73%- bipap hooked back up and patient sats at 96%

## 2022-05-23 NOTE — ED NOTES
Attempted straight cath on patient with only one drop of urine out. Patient was incontinent of urine prior to trying. Patient has several wounds on her bottom.

## 2022-05-23 NOTE — H&P
[facility] INITIAL ASSESSMENT. Name: Ashley Pena   : 1960   MRN: [id number]   Date: 2022        Chief Complaint   Patient presents with    Altered mental status     hypoxic, recent osteo       HPI:     Ms. Luc Funk is 58 yo morbidly obese CF with frequent admissions for respiratory failure, just discharged last wk after treatment of left buttock wound and also respiratory failure. Now sent from SNF after She was noted to be hypoxic by SNF staff this morning in the low 80s on her chronic 3 L nasal cannula, they bumped up to 5 L and noted no improvement so they called EMS. EMS bumped up to 6 L without improvement in her hypoxia and started her on a nonrebreather. She started on bipap support in ED, her ABG showed severed hypercapnia and respiratory acidosis. She is going to be admitted to ICU for further care and monitoring. Prbolem list:     -Acute on chronic hypoxemic and hypercapnic respiratory failure.  -Left sided pleural effusion. -JOSLYN/OHS. -Acute on chronic respiratory acidosis. -Super morbid obesity/Impaired mobility/functional quadriplegia. -Left hip fracture on prior admissions. -? Left hip osteomyelitis and sacral wounds on last admission.  -Paroxysmal A fib.  -HTN. Assessment/Plan:     Acute on chronic hypoxemic and hypercapnic respiratory failure.  -Sec to JOSLYN/OHS and left sided pleural effusion with pulmonary edema.  -Continue BiPAP PRN. -Diuresis as tolerated. -IR consult for left thoracenesis. ? left hip osteomyelitis/septic arthritis POA  -Found on CT scan done in the emergency room rule out osteomyelitis, MRI was done which is more suggestive of fracture and osteomyelitis  -[facility] evaluated no surgical intervention planned  -Consult wound care. -S/p  Aspiration?/Bx by IR, culture negative. Pt has been on multiple ABx since  , may affect final culture results. -ID recommends daptomycin and cefepime on discharge.  End date .  No statin while on daptomycin. ( ID follow-up was planned on  as OP. Weekly CBC, CMP, CK & ESR/CRP every other week. Resume Lipitor after completing ABx.)      Left sided pleural effusion.  -Consult IR for possible left thoracentesis. PAF:  -Continue amiodarone and xarelto. DVT prophylaxis. On xarelto. Code status. Full code. I personally spent 60 minutes of critical care time. This is time spent at this critically ill patient's bedside actively involved in patient care as well as the coordination of care and discussions with the patient's family. This does not include any procedural time which has been billed separately. Review of Systems:     Pertinent items are noted in HPI. Objective:   Vital Signs:  Visit Vitals  /72 (BP 1 Location: Right upper arm, BP Patient Position: At rest)   Pulse 67   Temp 98.7 °F (37.1 °C)   Resp (!) 32   SpO2 93%      O2 Device: None (Room air) Temp (24hrs), Av.7 °F (37.1 °C), Min:98.7 °F (37.1 °C), Max:98.7 °F (37.1 °C)           Intake/Output:   No intake or output data in the 24 hours ending 22 [id number]    Physical Exam:  Physical Exam  Constitutional:       Comments: Alert and oriented. HENT:      Head: Normocephalic and atraumatic. Mouth/Throat:      Mouth: Mucous membranes are moist.   Eyes:      Extraocular Movements: Extraocular movements intact. Conjunctiva/sclera: Conjunctivae normal.   Cardiovascular:      Rate and Rhythm: Normal rate and regular rhythm. Pulmonary:      Effort: Pulmonary effort is normal. No respiratory distress. Breath sounds: Normal breath sounds. Abdominal:      General: Abdomen is flat. There is no distension. Palpations: Abdomen is soft. Tenderness: There is no abdominal tenderness. There is no guarding. Musculoskeletal:      Cervical back: Normal range of motion and neck supple. Neurological:      Mental Status: She is oriented to person, place, and time.          Past Medical History:      has a past medical history of Acute on chronic respiratory failure with hypoxia and hypercapnia ([facility] [address] (11/20/2020), Atrial fibrillation ([facility] [address] (11/4/2020), CHF (congestive heart failure) ([facility] [address] [facility] Hypertension, Ill-defined condition, Ill-defined condition, JOSLYN (obstructive sleep apnea) (11/20/2020), Other and unspecified symptoms and signs involving general sensations and perceptions, and S/P cardiac cath (11/4/2020). Past Surgical History:      has a past surgical history that includes pr compre ep eval abltj 3d mapg tx svt (N/A, 11/23/2020); pr icar catheter ablation arrhythmia add on (N/A, 11/23/2020); pr intracardiac electrophysiologic 3d mapping (N/A, 11/23/2020); pr compre electrophysiol xm w/left atrial pacng/rec (N/A, 11/23/2020); and pr stim/pacing heart post iv drug infu (N/A, 11/23/2020). Home Medications:     Prior to Admission medications    Medication Sig Start Date End Date Taking? Authorizing Provider   rivaroxaban (XARELTO) 20 mg tab tablet Take 1 Tablet by mouth daily for 30 days. 5/18/22 6/17/22  Cherri Bernard MD   L.acid,para-B. bifidum-S.therm (RISAQUAD) 8 billion cell cap cap Take 1 Capsule by mouth daily for 36 days. 5/18/22 6/23/22  Cherri Bernard MD   atorvastatin (LIPITOR) 10 mg tablet Take 1 Tablet by mouth nightly. START AFTER COMPLETING ANTIBIOTICS TREATMENT ON 6/17 6/17/22   Cherri Bernard MD   amiodarone (CORDARONE) 200 mg tablet Take 1 Tablet by mouth daily for 30 days. 5/18/22 6/17/22  Cherri Bernard MD   cefepime 2 gram 2 g IVPB 2 g by IntraVENous route every eight (8) hours for 30 days. 5/17/22 6/16/22  Cherri Bernard MD   DAPTOmycin (CUBICIN) IVPB 8 mg/kg daily 5/17/22 6/16/22  Maged Blake MD   folic acid (FOLVITE) 1 mg tablet Take 1 Tablet by mouth daily for 30 days.  5/18/22 6/17/22  Cherri Bernard MD   gabapentin (NEURONTIN) 100 mg capsule Take 1 Capsule by mouth three (3) times daily for 30 days. Max Daily Amount: 300 mg. 5/17/22 6/16/22  Elizabeth Simmons MD   albuterol-ipratropium (DUO-NEB) 2.5 mg-0.5 mg/3 ml nebu 3 mL by Nebulization route every four (4) hours as needed for Wheezing, Shortness of Breath or Respiratory Distress. 12/8/20   Primus Child, MD   acetaminophen (TYLENOL) 325 mg tablet Take 650 mg by mouth every six (6) hours as needed for Pain. ProviderChun   furosemide (LASIX) 40 mg tablet Take 1 Tab by mouth daily. Patient taking differently: Take 20 mg by mouth daily. Takes prn when fluid overloaded 8/7/20   Jacob Collazo MD   metoprolol (LOPRESSOR) 25 mg tablet Take 25 mg by mouth two (2) times a day. Other, MD Russ       Allergies/Social/Family History: Allergies   Allergen Reactions    Cefepime Rash    Cefazolin Rash    Other Plant, Animal, Environmental Rash     Ivory soap caused rash on hands. Social History     Tobacco Use    Smoking status: Former Smoker    Smokeless tobacco: Never Used   Substance Use Topics    Alcohol use: No      No family history on file. LABS AND  DATA:   Personally reviewed      Peak airway pressure: 22 cm H2O    Minute ventilation: 12.4 l/min      CRITICAL CARE CONSULTANT NOTE  I had a face to face encounter with the patient, reviewed and interpreted patient data including clinical events, labs, images, vital signs, I/O's, and examined patient. I have discussed the case and the plan and management of the patient's care with the consulting services, the bedside nurses and the respiratory therapist.      NOTE OF PERSONAL INVOLVEMENT IN CARE   This patient has a high probability of imminent, clinically significant deterioration, which requires the highest level of preparedness to intervene urgently.  I participated in the decision-making and personally managed or directed the management of the following life and organ supporting interventions that required my frequent assessment to treat or prevent imminent deterioration.         Patricia Bains MD  Pulmonary and critical care  5/23/2022

## 2022-05-24 ENCOUNTER — APPOINTMENT (OUTPATIENT)
Dept: ULTRASOUND IMAGING | Age: 62
DRG: 189 | End: 2022-05-24
Attending: HOSPITALIST
Payer: MEDICARE

## 2022-05-24 LAB
ALBUMIN SERPL-MCNC: 2.3 G/DL (ref 3.5–5)
ALBUMIN/GLOB SERPL: 0.5 {RATIO} (ref 1.1–2.2)
ALP SERPL-CCNC: 737 U/L (ref 45–117)
ALT SERPL-CCNC: 28 U/L (ref 12–78)
AMORPH CRY URNS QL MICRO: ABNORMAL
ANION GAP SERPL CALC-SCNC: ABNORMAL MMOL/L (ref 5–15)
APPEARANCE UR: ABNORMAL
ARTERIAL PATENCY WRIST A: YES
AST SERPL-CCNC: 41 U/L (ref 15–37)
BACTERIA URNS QL MICRO: NEGATIVE /HPF
BASE EXCESS BLDA CALC-SCNC: 20.2 MMOL/L
BDY SITE: ABNORMAL
BILIRUB SERPL-MCNC: 0.8 MG/DL (ref 0.2–1)
BILIRUB UR QL: NEGATIVE
BUN SERPL-MCNC: 13 MG/DL (ref 6–20)
BUN/CREAT SERPL: 19 (ref 12–20)
CALCIUM SERPL-MCNC: 8.9 MG/DL (ref 8.5–10.1)
CAOX CRY URNS QL MICRO: ABNORMAL
CHLORIDE SERPL-SCNC: 89 MMOL/L (ref 97–108)
CO2 SERPL-SCNC: >45 MMOL/L (ref 21–32)
COLOR UR: ABNORMAL
CREAT SERPL-MCNC: 0.68 MG/DL (ref 0.55–1.02)
EPITH CASTS URNS QL MICRO: ABNORMAL /LPF
ERYTHROCYTE [DISTWIDTH] IN BLOOD BY AUTOMATED COUNT: 15 % (ref 11.5–14.5)
GLOBULIN SER CALC-MCNC: 5 G/DL (ref 2–4)
GLUCOSE SERPL-MCNC: 144 MG/DL (ref 65–100)
GLUCOSE UR STRIP.AUTO-MCNC: NEGATIVE MG/DL
HCO3 BLDA-SCNC: 44 MMOL/L (ref 22–26)
HCT VFR BLD AUTO: 36.8 % (ref 35–47)
HGB BLD-MCNC: 10.4 G/DL (ref 11.5–16)
HGB UR QL STRIP: ABNORMAL
KETONES UR QL STRIP.AUTO: ABNORMAL MG/DL
LEUKOCYTE ESTERASE UR QL STRIP.AUTO: ABNORMAL
MAGNESIUM SERPL-MCNC: 2 MG/DL (ref 1.6–2.4)
MCH RBC QN AUTO: 30.4 PG (ref 26–34)
MCHC RBC AUTO-ENTMCNC: 28.3 G/DL (ref 30–36.5)
MCV RBC AUTO: 107.6 FL (ref 80–99)
NITRITE UR QL STRIP.AUTO: NEGATIVE
NRBC # BLD: 0 K/UL (ref 0–0.01)
NRBC BLD-RTO: 0 PER 100 WBC
PCO2 BLDA: 46 MMHG (ref 35–45)
PH BLDA: 7.61 [PH] (ref 7.35–7.45)
PH UR STRIP: 5.5 [PH] (ref 5–8)
PHOSPHATE SERPL-MCNC: 1.7 MG/DL (ref 2.6–4.7)
PLATELET # BLD AUTO: 190 K/UL (ref 150–400)
PMV BLD AUTO: 11.3 FL (ref 8.9–12.9)
PO2 BLDA: 51 MMHG (ref 80–100)
POTASSIUM SERPL-SCNC: 3.6 MMOL/L (ref 3.5–5.1)
PROT SERPL-MCNC: 7.3 G/DL (ref 6.4–8.2)
PROT UR STRIP-MCNC: 30 MG/DL
RBC # BLD AUTO: 3.42 M/UL (ref 3.8–5.2)
RBC #/AREA URNS HPF: ABNORMAL /HPF (ref 0–5)
SAO2 % BLD: 91 % (ref 92–97)
SAO2% DEVICE SAO2% SENSOR NAME: ABNORMAL
SERVICE CMNT-IMP: ABNORMAL
SODIUM SERPL-SCNC: 138 MMOL/L (ref 136–145)
SP GR UR REFRACTOMETRY: 1.02
SPECIMEN SITE: ABNORMAL
UA: UC IF INDICATED,UAUC: ABNORMAL
UROBILINOGEN UR QL STRIP.AUTO: 0.2 EU/DL (ref 0.2–1)
WBC # BLD AUTO: 6.8 K/UL (ref 3.6–11)
WBC URNS QL MICRO: ABNORMAL /HPF (ref 0–4)

## 2022-05-24 PROCEDURE — 85027 COMPLETE CBC AUTOMATED: CPT

## 2022-05-24 PROCEDURE — 74011250636 HC RX REV CODE- 250/636: Performed by: NURSE PRACTITIONER

## 2022-05-24 PROCEDURE — 74011000250 HC RX REV CODE- 250: Performed by: HOSPITALIST

## 2022-05-24 PROCEDURE — 94640 AIRWAY INHALATION TREATMENT: CPT

## 2022-05-24 PROCEDURE — 94660 CPAP INITIATION&MGMT: CPT

## 2022-05-24 PROCEDURE — 74011250637 HC RX REV CODE- 250/637: Performed by: HOSPITALIST

## 2022-05-24 PROCEDURE — 5A09557 ASSISTANCE WITH RESPIRATORY VENTILATION, GREATER THAN 96 CONSECUTIVE HOURS, CONTINUOUS POSITIVE AIRWAY PRESSURE: ICD-10-PCS | Performed by: INTERNAL MEDICINE

## 2022-05-24 PROCEDURE — 76604 US EXAM CHEST: CPT

## 2022-05-24 PROCEDURE — 82803 BLOOD GASES ANY COMBINATION: CPT

## 2022-05-24 PROCEDURE — 36600 WITHDRAWAL OF ARTERIAL BLOOD: CPT

## 2022-05-24 PROCEDURE — 74011000250 HC RX REV CODE- 250: Performed by: NURSE PRACTITIONER

## 2022-05-24 PROCEDURE — 80053 COMPREHEN METABOLIC PANEL: CPT

## 2022-05-24 PROCEDURE — 74011250636 HC RX REV CODE- 250/636: Performed by: HOSPITALIST

## 2022-05-24 PROCEDURE — 83735 ASSAY OF MAGNESIUM: CPT

## 2022-05-24 PROCEDURE — 74011250637 HC RX REV CODE- 250/637: Performed by: NURSE PRACTITIONER

## 2022-05-24 PROCEDURE — 74011000258 HC RX REV CODE- 258: Performed by: HOSPITALIST

## 2022-05-24 PROCEDURE — 81001 URINALYSIS AUTO W/SCOPE: CPT

## 2022-05-24 PROCEDURE — 84100 ASSAY OF PHOSPHORUS: CPT

## 2022-05-24 PROCEDURE — 36415 COLL VENOUS BLD VENIPUNCTURE: CPT

## 2022-05-24 PROCEDURE — 65270000046 HC RM TELEMETRY

## 2022-05-24 PROCEDURE — 77010033678 HC OXYGEN DAILY

## 2022-05-24 RX ORDER — DIPHENHYDRAMINE HCL 25 MG
25 CAPSULE ORAL
Status: DISCONTINUED | OUTPATIENT
Start: 2022-05-24 | End: 2022-06-17 | Stop reason: HOSPADM

## 2022-05-24 RX ORDER — POTASSIUM CHLORIDE 750 MG/1
20 TABLET, FILM COATED, EXTENDED RELEASE ORAL
Status: COMPLETED | OUTPATIENT
Start: 2022-05-24 | End: 2022-05-24

## 2022-05-24 RX ORDER — DIPHENHYDRAMINE HYDROCHLORIDE 50 MG/ML
25 INJECTION, SOLUTION INTRAMUSCULAR; INTRAVENOUS ONCE
Status: COMPLETED | OUTPATIENT
Start: 2022-05-24 | End: 2022-05-24

## 2022-05-24 RX ORDER — LIDOCAINE 4 G/100G
1 PATCH TOPICAL EVERY 24 HOURS
Status: DISCONTINUED | OUTPATIENT
Start: 2022-05-24 | End: 2022-05-25

## 2022-05-24 RX ADMIN — IPRATROPIUM BROMIDE AND ALBUTEROL SULFATE 3 ML: .5; 3 SOLUTION RESPIRATORY (INHALATION) at 11:31

## 2022-05-24 RX ADMIN — METHYLPREDNISOLONE SODIUM SUCCINATE 60 MG: 125 INJECTION, POWDER, FOR SOLUTION INTRAMUSCULAR; INTRAVENOUS at 22:03

## 2022-05-24 RX ADMIN — RIVAROXABAN 20 MG: 20 TABLET, FILM COATED ORAL at 08:40

## 2022-05-24 RX ADMIN — IPRATROPIUM BROMIDE AND ALBUTEROL SULFATE 3 ML: .5; 3 SOLUTION RESPIRATORY (INHALATION) at 16:24

## 2022-05-24 RX ADMIN — FUROSEMIDE 40 MG: 10 INJECTION, SOLUTION INTRAMUSCULAR; INTRAVENOUS at 22:03

## 2022-05-24 RX ADMIN — FUROSEMIDE 40 MG: 10 INJECTION, SOLUTION INTRAMUSCULAR; INTRAVENOUS at 08:40

## 2022-05-24 RX ADMIN — ACETAMINOPHEN 650 MG: 325 TABLET ORAL at 08:39

## 2022-05-24 RX ADMIN — METHYLPREDNISOLONE SODIUM SUCCINATE 60 MG: 125 INJECTION, POWDER, FOR SOLUTION INTRAMUSCULAR; INTRAVENOUS at 13:07

## 2022-05-24 RX ADMIN — ONDANSETRON 4 MG: 2 INJECTION INTRAMUSCULAR; INTRAVENOUS at 02:07

## 2022-05-24 RX ADMIN — GABAPENTIN 100 MG: 100 CAPSULE ORAL at 22:03

## 2022-05-24 RX ADMIN — METHYLPREDNISOLONE SODIUM SUCCINATE 60 MG: 125 INJECTION, POWDER, FOR SOLUTION INTRAMUSCULAR; INTRAVENOUS at 07:04

## 2022-05-24 RX ADMIN — PIPERACILLIN AND TAZOBACTAM 3.38 G: 3; .375 INJECTION, POWDER, LYOPHILIZED, FOR SOLUTION INTRAVENOUS at 13:07

## 2022-05-24 RX ADMIN — NYSTATIN: 100000 POWDER TOPICAL at 08:40

## 2022-05-24 RX ADMIN — NYSTATIN: 100000 POWDER TOPICAL at 17:11

## 2022-05-24 RX ADMIN — PIPERACILLIN AND TAZOBACTAM 3.38 G: 3; .375 INJECTION, POWDER, LYOPHILIZED, FOR SOLUTION INTRAVENOUS at 22:03

## 2022-05-24 RX ADMIN — GABAPENTIN 100 MG: 100 CAPSULE ORAL at 17:11

## 2022-05-24 RX ADMIN — DAPTOMYCIN 700 MG: 500 INJECTION, POWDER, LYOPHILIZED, FOR SOLUTION INTRAVENOUS at 11:22

## 2022-05-24 RX ADMIN — SODIUM CHLORIDE, PRESERVATIVE FREE 10 ML: 5 INJECTION INTRAVENOUS at 13:03

## 2022-05-24 RX ADMIN — DIPHENHYDRAMINE HYDROCHLORIDE 25 MG: 50 INJECTION, SOLUTION INTRAMUSCULAR; INTRAVENOUS at 22:25

## 2022-05-24 RX ADMIN — PIPERACILLIN AND TAZOBACTAM 3.38 G: 3; .375 INJECTION, POWDER, LYOPHILIZED, FOR SOLUTION INTRAVENOUS at 07:05

## 2022-05-24 RX ADMIN — POTASSIUM CHLORIDE 20 MEQ: 750 TABLET, EXTENDED RELEASE ORAL at 08:40

## 2022-05-24 RX ADMIN — POTASSIUM PHOSPHATE, MONOBASIC AND POTASSIUM PHOSPHATE, DIBASIC: 224; 236 INJECTION, SOLUTION, CONCENTRATE INTRAVENOUS at 07:05

## 2022-05-24 RX ADMIN — IPRATROPIUM BROMIDE AND ALBUTEROL SULFATE 3 ML: .5; 3 SOLUTION RESPIRATORY (INHALATION) at 19:29

## 2022-05-24 RX ADMIN — SODIUM CHLORIDE, PRESERVATIVE FREE 10 ML: 5 INJECTION INTRAVENOUS at 07:10

## 2022-05-24 RX ADMIN — SODIUM CHLORIDE, PRESERVATIVE FREE 10 ML: 5 INJECTION INTRAVENOUS at 22:00

## 2022-05-24 RX ADMIN — IPRATROPIUM BROMIDE AND ALBUTEROL SULFATE 3 ML: .5; 3 SOLUTION RESPIRATORY (INHALATION) at 07:59

## 2022-05-24 RX ADMIN — AMIODARONE HYDROCHLORIDE 200 MG: 200 TABLET ORAL at 08:40

## 2022-05-24 RX ADMIN — GABAPENTIN 100 MG: 100 CAPSULE ORAL at 08:40

## 2022-05-24 NOTE — PROGRESS NOTES
Patient determined to not be a good candidate for thoracentesis today due to lack of sufficient fluid collection per Melody Brandon [phone] Greg Fallon RN informed no thoracentesis to be performed today.

## 2022-05-24 NOTE — WOUND CARE
Wound care consulted to see this patient today but are unable to see her at this time. Pt. Was here recently and wound care nurse is familiar with the patient's wound. Plan: For now the wound can be dressed with a NS wet to dry dressing. Orders place in [facility] for today. Wound care nurse to see patient tomorrow morning.    Michael Trinh RN, BSN, [employer]

## 2022-05-24 NOTE — PROGRESS NOTES
Cefepime changed to Zosyn 2/2 documented allergy to cefepime and presence of generalized red non raised rash to arms/ legs bilaterally. Patient previously discharged on Cefepime, unknown if rash 2/2 to antibiotic or other contributing factors. Patient previously received Zosyn 05/03/22 per chart review.     JUANJOSE Carreon-BC

## 2022-05-24 NOTE — PROGRESS NOTES
Critical Care Progress Note  Milderd Severe, MD          Date of Service:  2022  NAME:  Silvano Quintanilla  :  1960  MRN:  [id number]      Subjective/Hospital course:      : Patient used BiPAP overnight, now down to 3 lit oxygen. No acute events overnight. Problem list:   -Acute on chronic hypoxemic and hypercapnic respiratory failure.  -Left sided pleural effusion. -JOSLYN/OHS. -Acute on chronic respiratory acidosis. -Super morbid obesity/Impaired mobility/functional quadriplegia. -Left hip fracture on prior admissions. -? Left hip osteomyelitis and sacral wounds on last admission.  -Paroxysmal A fib.  -HTN. Assessment/Plan:     Acute on chronic hypoxemic and hypercapnic respiratory failure.  -Sec to JOSLYN/OHS and left sided pleural effusion with pulmonary edema.  -Continue BiPAP PRN. -Diuresis as tolerated. -IR consulted for left thoracenesis but not enough fluid to tap.     ?left hip osteomyelitis/septic arthritis POA  -Found on CT scan done in the emergency room rule out osteomyelitis, MRI was done which is more suggestive of fracture and osteomyelitis  -[facility] evaluated no surgical intervention planned  -Consult wound care. -S/p  Aspiration?/Bx by IR, culture negative. Pt has been on multiple ABx since  , may affect final culture results. -ID recommends daptomycin and cefepime on discharge.  End date .  No statin while on daptomycin. ( ID follow-up was planned on  as OP. Weekly CBC, CMP, CK & ESR/CRP every other week. Resume Lipitor after completing ABx. )        Left sided pleural effusion.  -Consult IR for possible left thoracentesis but not enough fluid to tap.     PAF:  -Continue amiodarone and xarelto.     DVT prophylaxis. On xarelto. Code status. Full code. May transfer out of ICU. I personally spent --- minutes of critical care time.   This is time spent at this critically ill patient's bedside actively involved in patient care as well as the coordination of care and discussions with the patient's family. This does not include any procedural time which has been billed separately. Review of Systems:   Pertinent items are noted in HPI. Vital Signs:     Patient Vitals for the past 4 hrs:   BP Temp Pulse Resp SpO2   05/24/22 0808 -- -- -- -- 93 %   05/24/22 0804 -- -- -- -- 93 %   05/24/22 0800 (!) 126/101 98.2 °F (36.8 °C) 85 18 95 %   05/24/22 0701 135/76 -- 83 18 93 %        Intake/Output Summary (Last 24 hours) at 5/24/2022 1028  Last data filed at 5/24/2022 0900  Gross per 24 hour   Intake 200 ml   Output 700 ml   Net -500 ml        Physical Examination:    Physical Exam  Constitutional:       Appearance: Normal appearance. HENT:      Head: Normocephalic and atraumatic. Mouth/Throat:      Mouth: Mucous membranes are moist.   Eyes:      Extraocular Movements: Extraocular movements intact. Conjunctiva/sclera: Conjunctivae normal.   Cardiovascular:      Rate and Rhythm: Normal rate and regular rhythm. Pulmonary:      Effort: Pulmonary effort is normal. No respiratory distress. Breath sounds: Normal breath sounds. Abdominal:      General: Abdomen is flat. There is no distension. Palpations: Abdomen is soft. Tenderness: There is no abdominal tenderness. There is no guarding. Musculoskeletal:      Cervical back: Normal range of motion and neck supple. Neurological:      Mental Status: She is alert and oriented to person, place, and time. Labs:   Labs and imaging reviewed.       Medications:     Current Facility-Administered Medications   Medication Dose Route Frequency    potassium phosphate 20 mmol in 0.9% sodium chloride 500 mL infusion   IntraVENous ONCE    sodium chloride (NS) flush 5-40 mL  5-40 mL IntraVENous Q8H    sodium chloride (NS) flush 5-40 mL  5-40 mL IntraVENous PRN    acetaminophen (TYLENOL) tablet 650 mg  650 mg Oral Q6H PRN    Or    acetaminophen (TYLENOL) suppository 650 mg  650 mg Rectal Q6H PRN    polyethylene glycol (MIRALAX) packet 17 g  17 g Oral DAILY PRN    ondansetron (ZOFRAN ODT) tablet 4 mg  4 mg Oral Q8H PRN    Or    ondansetron (ZOFRAN) injection 4 mg  4 mg IntraVENous Q6H PRN    albuterol-ipratropium (DUO-NEB) 2.5 MG-0.5 MG/3 ML  3 mL Nebulization Q4H PRN    amiodarone (CORDARONE) tablet 200 mg  200 mg Oral DAILY    gabapentin (NEURONTIN) capsule 100 mg  100 mg Oral TID    rivaroxaban (XARELTO) tablet 20 mg  20 mg Oral DAILY WITH BREAKFAST    albuterol-ipratropium (DUO-NEB) 2.5 MG-0.5 MG/3 ML  3 mL Nebulization QID RT    methylPREDNISolone (PF) (Solu-MEDROL) injection 60 mg  60 mg IntraVENous Q8H    DAPTOmycin (CUBICIN) 700 mg in 0.9% sodium chloride 50 mL IVPB  8 mg/kg (Adjusted) IntraVENous Q24H    nystatin (MYCOSTATIN) 100,000 unit/gram powder   Topical BID    piperacillin-tazobactam (ZOSYN) 3.375 g in 0.9% sodium chloride (MBP/ADV) 100 mL MBP  3.375 g IntraVENous Q8H    furosemide (LASIX) injection 40 mg  40 mg IntraVENous Q12H     ______________________________________________________________________  EXPECTED LENGTH OF STAY: - - -  ACTUAL LENGTH OF STAY:          [address] Riya Dia MD   Pulmonary/CCM  [address]  [phone]  5/24/2022

## 2022-05-24 NOTE — ED NOTES
Patient report given to ICU, RN. VSS. No other complaints at this time. All questions answered and orders reviewed.

## 2022-05-24 NOTE — PROGRESS NOTES
2011: Received patient from the ER on BiPAP. Multiple skin issues including excoriation under breast bilaterally, excoriation to groin and under panus, left external labial tear, dressing to wound on left buttocks changed, redness and discoloration noted to sacrum/buttocks area, and rash noted to upper and lower extremities bilaterally. Jaky Fried at bedside and aware. 2100: Attempted to place irwin twice with 3 RNs at bedside. Unable to place irwin. Pure wick was placed. Pictures of wounds taken and placed on chart. 0715: Report given to oncAlbertina trejo RN.

## 2022-05-24 NOTE — PROGRESS NOTES
Bedside shift change report given to [facility] R (oncoming nurse) by GRANT Clark (offgoing nurse). Report included the following information SBAR.     0800 - Full assessment complete. 0830 - Pt switched from BIPAP to NC 3L by RT. Oxygen saturation 89-90%. Pt removed NC when RN left room and desat to 68% on RA. Oxygen placed back on patient and education provided. Recovered to 91%. 0793 - IR at bedside with ultrasound. No thoracentesis needed today. 1009 - Updated patient's daughter over the phone. 1050 - Interdisciplinary team rounds were held 5/24/2022 with the following team members:Care Management, Nursing, Pharmacy, Physician and Respiratory Therapy. Plan of care discussed. See clinical pathway and/or care plan for interventions and desired outcomes. Goals of the Day: MD assessed the redness, flaking, and rash on patient's chest/legs/arms. MD OK with all morning meds, aware of QTC 0.49-0.5    1400 - Notified Dr. John Shane of L knee pain, given orders for lidocaine patch. 1430 - Per radiologist, Dr. Fuad Chawla, PICC line is in proper placement. OK to be used. 1600 - Reassessment complete. No changes to generalized rash. 2053 - Attempted to call patient's daughter with update on transfer. No answer. Will let PCU RN know. TRANSFER - OUT REPORT:    Verbal report given to Aleksander Veloz RN (name) on Jeffrey Hardwick  being transferred to PCU (unit) for routine progression of care       Report consisted of patients Situation, Background, Assessment and   Recommendations(SBAR). Information from the following report(s) SBAR was reviewed with the receiving nurse.     Lines:   PICC Single Lumen 23/26/42 Left;Basilic (Active)   Central Line Being Utilized Yes 05/24/22 1600   Criteria for Appropriate Use Long term IV/antibiotic administration 05/24/22 1600   Site Assessment Clean, dry, & intact 05/24/22 1600   Phlebitis Assessment 0 05/24/22 1600   Infiltration Assessment 0 05/24/22 1600   Date of Last Dressing Change 05/24/22 05/24/22 1600   Dressing Status Clean, dry, & intact; Occlusive 05/24/22 1600   External Catheter Length (cm) 0 centimeters 05/24/22 1118   Dressing Type Disk with Chlorhexadine gluconate (CHG); Transparent;Stabilization/securement device 05/24/22 1600   Hub Color/Line Status Purple;Flushed;Patent; Infusing 05/24/22 1600   Action Taken Open ports on tubing capped 05/24/22 1600   Positive Blood Return (Site #1) Yes 05/24/22 1600   Alcohol Cap Used Yes 05/24/22 1600       Peripheral IV 34/40/95 Right Cephalic (Active)   Site Assessment Clean, dry, & intact 05/24/22 1600   Phlebitis Assessment 0 05/24/22 1600   Infiltration Assessment 0 05/24/22 1600   Dressing Status Clean, dry, & intact 05/24/22 1600   Dressing Type Transparent;Tape 05/24/22 1600   Hub Color/Line Status Green;Flushed; Infusing 05/24/22 1600   Action Taken Open ports on tubing capped 05/24/22 1600   Alcohol Cap Used Yes 05/24/22 1600        Opportunity for questions and clarification was provided.       Patient transported with:   Monitor

## 2022-05-25 LAB
ALBUMIN SERPL-MCNC: 2.3 G/DL (ref 3.5–5)
ALBUMIN/GLOB SERPL: 0.5 {RATIO} (ref 1.1–2.2)
ALP SERPL-CCNC: 659 U/L (ref 45–117)
ALT SERPL-CCNC: 23 U/L (ref 12–78)
ANION GAP SERPL CALC-SCNC: ABNORMAL MMOL/L (ref 5–15)
AST SERPL-CCNC: 33 U/L (ref 15–37)
BILIRUB SERPL-MCNC: 0.6 MG/DL (ref 0.2–1)
BUN SERPL-MCNC: 20 MG/DL (ref 6–20)
BUN/CREAT SERPL: 26 (ref 12–20)
CALCIUM SERPL-MCNC: 8.7 MG/DL (ref 8.5–10.1)
CHLORIDE SERPL-SCNC: 88 MMOL/L (ref 97–108)
CO2 SERPL-SCNC: >45 MMOL/L (ref 21–32)
CREAT SERPL-MCNC: 0.77 MG/DL (ref 0.55–1.02)
ERYTHROCYTE [DISTWIDTH] IN BLOOD BY AUTOMATED COUNT: 15.6 % (ref 11.5–14.5)
GLOBULIN SER CALC-MCNC: 5 G/DL (ref 2–4)
GLUCOSE SERPL-MCNC: 181 MG/DL (ref 65–100)
HCT VFR BLD AUTO: 34.4 % (ref 35–47)
HGB BLD-MCNC: 9.9 G/DL (ref 11.5–16)
MAGNESIUM SERPL-MCNC: 2.1 MG/DL (ref 1.6–2.4)
MCH RBC QN AUTO: 30.2 PG (ref 26–34)
MCHC RBC AUTO-ENTMCNC: 28.8 G/DL (ref 30–36.5)
MCV RBC AUTO: 104.9 FL (ref 80–99)
NRBC # BLD: 0.02 K/UL (ref 0–0.01)
NRBC BLD-RTO: 0.2 PER 100 WBC
PHOSPHATE SERPL-MCNC: 2.8 MG/DL (ref 2.6–4.7)
PLATELET # BLD AUTO: 161 K/UL (ref 150–400)
PMV BLD AUTO: 11 FL (ref 8.9–12.9)
POTASSIUM SERPL-SCNC: 4 MMOL/L (ref 3.5–5.1)
PROT SERPL-MCNC: 7.3 G/DL (ref 6.4–8.2)
RBC # BLD AUTO: 3.28 M/UL (ref 3.8–5.2)
SODIUM SERPL-SCNC: 136 MMOL/L (ref 136–145)
WBC # BLD AUTO: 10.7 K/UL (ref 3.6–11)

## 2022-05-25 PROCEDURE — 74011250636 HC RX REV CODE- 250/636: Performed by: HOSPITALIST

## 2022-05-25 PROCEDURE — 65270000046 HC RM TELEMETRY

## 2022-05-25 PROCEDURE — 83735 ASSAY OF MAGNESIUM: CPT

## 2022-05-25 PROCEDURE — 74011250637 HC RX REV CODE- 250/637: Performed by: NURSE PRACTITIONER

## 2022-05-25 PROCEDURE — 74011250637 HC RX REV CODE- 250/637: Performed by: HOSPITALIST

## 2022-05-25 PROCEDURE — 94660 CPAP INITIATION&MGMT: CPT

## 2022-05-25 PROCEDURE — 77010033678 HC OXYGEN DAILY

## 2022-05-25 PROCEDURE — 74011000258 HC RX REV CODE- 258: Performed by: HOSPITALIST

## 2022-05-25 PROCEDURE — 94640 AIRWAY INHALATION TREATMENT: CPT

## 2022-05-25 PROCEDURE — 36415 COLL VENOUS BLD VENIPUNCTURE: CPT

## 2022-05-25 PROCEDURE — 74011000250 HC RX REV CODE- 250: Performed by: NURSE PRACTITIONER

## 2022-05-25 PROCEDURE — 84100 ASSAY OF PHOSPHORUS: CPT

## 2022-05-25 PROCEDURE — 85027 COMPLETE CBC AUTOMATED: CPT

## 2022-05-25 PROCEDURE — 80053 COMPREHEN METABOLIC PANEL: CPT

## 2022-05-25 PROCEDURE — 74011250636 HC RX REV CODE- 250/636: Performed by: NURSE PRACTITIONER

## 2022-05-25 PROCEDURE — 74011000250 HC RX REV CODE- 250: Performed by: HOSPITALIST

## 2022-05-25 RX ORDER — LIDOCAINE 4 G/100G
2 PATCH TOPICAL EVERY 24 HOURS
Status: DISCONTINUED | OUTPATIENT
Start: 2022-05-26 | End: 2022-06-13

## 2022-05-25 RX ADMIN — AMIODARONE HYDROCHLORIDE 200 MG: 200 TABLET ORAL at 08:09

## 2022-05-25 RX ADMIN — ACETAMINOPHEN 650 MG: 325 TABLET ORAL at 00:45

## 2022-05-25 RX ADMIN — NYSTATIN: 100000 POWDER TOPICAL at 17:00

## 2022-05-25 RX ADMIN — METHYLPREDNISOLONE SODIUM SUCCINATE 50 MG: 125 INJECTION, POWDER, FOR SOLUTION INTRAMUSCULAR; INTRAVENOUS at 13:21

## 2022-05-25 RX ADMIN — RIVAROXABAN 20 MG: 20 TABLET, FILM COATED ORAL at 08:09

## 2022-05-25 RX ADMIN — SODIUM CHLORIDE, PRESERVATIVE FREE 10 ML: 5 INJECTION INTRAVENOUS at 23:02

## 2022-05-25 RX ADMIN — DAPTOMYCIN 700 MG: 500 INJECTION, POWDER, LYOPHILIZED, FOR SOLUTION INTRAVENOUS at 15:23

## 2022-05-25 RX ADMIN — FUROSEMIDE 40 MG: 10 INJECTION, SOLUTION INTRAMUSCULAR; INTRAVENOUS at 08:09

## 2022-05-25 RX ADMIN — NYSTATIN: 100000 POWDER TOPICAL at 08:14

## 2022-05-25 RX ADMIN — GABAPENTIN 100 MG: 100 CAPSULE ORAL at 23:02

## 2022-05-25 RX ADMIN — GABAPENTIN 100 MG: 100 CAPSULE ORAL at 15:23

## 2022-05-25 RX ADMIN — PIPERACILLIN AND TAZOBACTAM 3.38 G: 3; .375 INJECTION, POWDER, LYOPHILIZED, FOR SOLUTION INTRAVENOUS at 06:00

## 2022-05-25 RX ADMIN — SODIUM CHLORIDE, PRESERVATIVE FREE 10 ML: 5 INJECTION INTRAVENOUS at 13:23

## 2022-05-25 RX ADMIN — SODIUM CHLORIDE, PRESERVATIVE FREE 10 ML: 5 INJECTION INTRAVENOUS at 06:03

## 2022-05-25 RX ADMIN — IPRATROPIUM BROMIDE AND ALBUTEROL SULFATE 3 ML: .5; 3 SOLUTION RESPIRATORY (INHALATION) at 19:16

## 2022-05-25 RX ADMIN — IPRATROPIUM BROMIDE AND ALBUTEROL SULFATE 3 ML: .5; 3 SOLUTION RESPIRATORY (INHALATION) at 09:35

## 2022-05-25 RX ADMIN — PIPERACILLIN AND TAZOBACTAM 3.38 G: 3; .375 INJECTION, POWDER, LYOPHILIZED, FOR SOLUTION INTRAVENOUS at 13:21

## 2022-05-25 RX ADMIN — DIPHENHYDRAMINE HYDROCHLORIDE, ZINC ACETATE: 2; .1 CREAM TOPICAL at 13:19

## 2022-05-25 RX ADMIN — GABAPENTIN 100 MG: 100 CAPSULE ORAL at 08:09

## 2022-05-25 RX ADMIN — IPRATROPIUM BROMIDE AND ALBUTEROL SULFATE 3 ML: .5; 3 SOLUTION RESPIRATORY (INHALATION) at 16:52

## 2022-05-25 RX ADMIN — METHYLPREDNISOLONE SODIUM SUCCINATE 60 MG: 125 INJECTION, POWDER, FOR SOLUTION INTRAMUSCULAR; INTRAVENOUS at 06:00

## 2022-05-25 RX ADMIN — METHYLPREDNISOLONE SODIUM SUCCINATE 50 MG: 125 INJECTION, POWDER, FOR SOLUTION INTRAMUSCULAR; INTRAVENOUS at 23:02

## 2022-05-25 RX ADMIN — DIPHENHYDRAMINE HYDROCHLORIDE, ZINC ACETATE: 2; .1 CREAM TOPICAL at 17:01

## 2022-05-25 RX ADMIN — ACETAMINOPHEN 650 MG: 325 TABLET ORAL at 16:59

## 2022-05-25 RX ADMIN — PIPERACILLIN AND TAZOBACTAM 3.38 G: 3; .375 INJECTION, POWDER, LYOPHILIZED, FOR SOLUTION INTRAVENOUS at 23:02

## 2022-05-25 NOTE — PROGRESS NOTES
Talked to Dr. [employer] Shepard, pt does wear a Trilogy at home, so if we were able to get home settings we could set her up on those. Daughter provided Trilogy Settings of AVAPS-AE, Rate 5, Vt 525, Max Pressure Support of 25, Min PS 5, Epap Max of 8, Min of 5. Changed her settings on the VPAP to IVAPS, Rate of 8, Target VA 3.4, EPAP 8, Ps MAx 20, PS min 8, Ti Max 0.9, Ti min 0.3 seconds, Rise 300ms. Placed patient on these settings and Spo2 99%, HR 89 and tolerating well.

## 2022-05-25 NOTE — CONSULTS
Pulmonary, Critical Care, and Sleep Medicine    Initial Patient Consult    Name: Jm Pickett MRN: [id number]   : 1960 Hospital: [facility]   Date: 2022        IMPRESSION:   · Acute on chronic respiratory failure  · JOSLYN/OHS      RECOMMENDATIONS:   · O2 during the day  · NIV home settings at night  · ABG much improved  · Taper steroids  · Jet nebs  · Diurese  · IV abx per ID  · F/u as out pt with Dr Mariposa Kc  · Will follow PRN     Subjective: This patient has been seen and evaluated at the request of Dr. Vlad Lackey for hypercapnia.  Patient is a 57 y.o. female admitted with hypercapnic respiratory failure after not being on NIV at her SNF  Pt responded to BIPAP  Today pt awake, alert, in no distress      Past Medical History:   Diagnosis Date    Acute on chronic respiratory failure with hypoxia and hypercapnia (HCC) 2020    Atrial fibrillation ([facility] 2020    CHF (congestive heart failure) [facility]     Diabetes ([facility]     Hypertension     Ill-defined condition     high cholesterol    Ill-defined condition     tachycardia    JOSLYN (obstructive sleep apnea) 2020    Other and unspecified symptoms and signs involving general sensations and perceptions     ruptured disc    S/P cardiac cath 2020    11/3/2020 nonobstructive disease      Past Surgical History:   Procedure Laterality Date    HI COMPRE ELECTROPHYSIOL XM W/LEFT ATRIAL PACNG/REC N/A 2020    Lt Atrial Pace & Record During Ep Study performed by Beverley Cr MD at [facility] CARDIAC CATH LAB    HI COMPRE EP EVAL ABLTJ 3D MAPG TX SVT N/A 2020    ABLATION A-FLUTTER performed by Beverley Cr MD at [facility] CARDIAC CATH LAB     [address] ADD ON N/A 2020    Ablation Svt/Vt Add On performed by Beverley Cr MD at [facility] CARDIAC CATH LAB    HI INTRACARDIAC ELECTROPHYSIOLOGIC 3D MAPPING N/A 2020    Ep 3d Mapping performed by Beverley Cr MD at [facility] CARDIAC CATH LAB    NC STIM/PACING HEART POST IV DRUG INFU N/A 11/23/2020    Drug Stimulation performed by Deyanne Saint, MD at [facility] CARDIAC CATH LAB      Prior to Admission medications    Medication Sig Start Date End Date Taking? Authorizing Provider   rivaroxaban (XARELTO) 20 mg tab tablet Take 1 Tablet by mouth daily for 30 days. 5/18/22 6/17/22  Evelin Lay MD   L.acid,para-B. bifidum-S.therm (RISAQUAD) 8 billion cell cap cap Take 1 Capsule by mouth daily for 36 days. 5/18/22 6/23/22  Evelin Lay MD   atorvastatin (LIPITOR) 10 mg tablet Take 1 Tablet by mouth nightly. START AFTER COMPLETING ANTIBIOTICS TREATMENT ON 6/17 6/17/22   Evelin Lay MD   amiodarone (CORDARONE) 200 mg tablet Take 1 Tablet by mouth daily for 30 days. 5/18/22 6/17/22  Evelin Lay MD   cefepime 2 gram 2 g IVPB 2 g by IntraVENous route every eight (8) hours for 30 days. 5/17/22 6/16/22  Evelin Lay MD   DAPTOmycin (CUBICIN) IVPB 8 mg/kg daily 5/17/22 6/16/22  Demetri Blake MD   folic acid (FOLVITE) 1 mg tablet Take 1 Tablet by mouth daily for 30 days. 5/18/22 6/17/22  Evelin Lay MD   gabapentin (NEURONTIN) 100 mg capsule Take 1 Capsule by mouth three (3) times daily for 30 days. Max Daily Amount: 300 mg. 5/17/22 6/16/22  Evelin Lay MD   albuterol-ipratropium (DUO-NEB) 2.5 mg-0.5 mg/3 ml nebu 3 mL by Nebulization route every four (4) hours as needed for Wheezing, Shortness of Breath or Respiratory Distress. 12/8/20   Jori Mir MD   acetaminophen (TYLENOL) 325 mg tablet Take 650 mg by mouth every six (6) hours as needed for Pain. Provider, Historical   furosemide (LASIX) 40 mg tablet Take 1 Tab by mouth daily. Patient taking differently: Take 20 mg by mouth daily. Takes prn when fluid overloaded 8/7/20   David Palmer MD   metoprolol (LOPRESSOR) 25 mg tablet Take 25 mg by mouth two (2) times a day.     Rachael, MD Russ     Allergies   Allergen Reactions    Cefepime Rash    Cefazolin Rash    Other Plant, Animal, Environmental Rash     Ivory soap caused rash on hands. Social History     Tobacco Use    Smoking status: Former Smoker    Smokeless tobacco: Never Used   Substance Use Topics    Alcohol use: No      No family history on file. Current Facility-Administered Medications   Medication Dose Route Frequency    lidocaine 4 % patch 1 Patch  1 Patch TransDERmal Q24H    sodium chloride (NS) flush 5-40 mL  5-40 mL IntraVENous Q8H    amiodarone (CORDARONE) tablet 200 mg  200 mg Oral DAILY    gabapentin (NEURONTIN) capsule 100 mg  100 mg Oral TID    rivaroxaban (XARELTO) tablet 20 mg  20 mg Oral DAILY WITH BREAKFAST    albuterol-ipratropium (DUO-NEB) 2.5 MG-0.5 MG/3 ML  3 mL Nebulization QID RT    methylPREDNISolone (PF) (Solu-MEDROL) injection 60 mg  60 mg IntraVENous Q8H    DAPTOmycin (CUBICIN) 700 mg in 0.9% sodium chloride 50 mL IVPB  8 mg/kg (Adjusted) IntraVENous Q24H    nystatin (MYCOSTATIN) 100,000 unit/gram powder   Topical BID    piperacillin-tazobactam (ZOSYN) 3.375 g in 0.9% sodium chloride (MBP/ADV) 100 mL MBP  3.375 g IntraVENous Q8H       Review of Systems:  A comprehensive review of systems was negative except for: Respiratory: positive for dyspnea on exertion    Objective:   Vital Signs:    Visit Vitals  BP (!) 127/55 (BP 1 Location: Right lower arm, BP Patient Position: At rest;Lying)   Pulse 96   Temp 98.6 °F (37 °C)   Resp 17   Wt 135 kg (297 lb 9.9 oz)   SpO2 97%   BMI 52.72 kg/m²       O2 Device: Nasal cannula   O2 Flow Rate (L/min): 4 l/min   Temp (24hrs), Av °F (37.2 °C), Min:98.3 °F (36.8 °C), Max:99.4 °F (37.4 °C)       Intake/Output:   Last shift:      No intake/output data recorded. Last 3 shifts:  1901 -  0700  In: 1450 [P.O.:600;  I.V.:850]  Out: 1350 [Urine:1350]    Intake/Output Summary (Last 24 hours) at 2022 1130  Last data filed at 2022 1744  Gross per 24 hour   Intake 1250 ml Output 650 ml   Net 600 ml      Physical Exam:   General:  Alert, cooperative, no distress, appears stated age. Head:  Normocephalic, without obvious abnormality, atraumatic. Eyes:  Conjunctivae/corneas clear. PERRL, EOMs intact. Nose: Nares normal. Septum midline. Mucosa normal. No drainage or sinus tenderness. Throat: Lips, mucosa, and tongue normal. Teeth and gums normal.   Neck: Supple, symmetrical, trachea midline, no adenopathy, thyroid: no enlargment/tenderness/nodules, no carotid bruit and no JVD. Back:   Symmetric, no curvature. ROM normal.   Lungs:   Clear to auscultation bilaterally. Chest wall:  No tenderness or deformity. Heart:  Regular rate and rhythm, S1, S2 normal, no murmur, click, rub or gallop. Abdomen:   Soft, non-tender. Bowel sounds normal. No masses,  No organomegaly. Extremities: Extremities normal, atraumatic, no cyanosis, ++ edema. Pulses: 2+ and symmetric all extremities.    Skin: Skin color, texture, turgor normal. No rashes or lesions   Lymph nodes: Cervical, supraclavicular, and axillary nodes normal.   Neurologic: Grossly nonfocal     Data review:     Recent Results (from the past 24 hour(s))   MAGNESIUM    Collection Time: 05/25/22  3:27 AM   Result Value Ref Range    Magnesium 2.1 1.6 - 2.4 mg/dL   PHOSPHORUS    Collection Time: 05/25/22  3:27 AM   Result Value Ref Range    Phosphorus 2.8 2.6 - 4.7 MG/DL   CBC W/O DIFF    Collection Time: 05/25/22  3:27 AM   Result Value Ref Range    WBC 10.7 3.6 - 11.0 K/uL    RBC 3.28 (L) 3.80 - 5.20 M/uL    HGB 9.9 (L) 11.5 - 16.0 g/dL    HCT 34.4 (L) 35.0 - 47.0 %    .9 (H) 80.0 - 99.0 FL    MCH 30.2 26.0 - 34.0 PG    MCHC 28.8 (L) 30.0 - 36.5 g/dL    RDW 15.6 (H) 11.5 - 14.5 %    PLATELET 680 440 - 373 K/uL    MPV 11.0 8.9 - 12.9 FL    NRBC 0.2 (H) 0  WBC    ABSOLUTE NRBC 0.02 (H) 0.00 - 8.57 K/uL   METABOLIC PANEL, COMPREHENSIVE    Collection Time: 05/25/22  3:27 AM   Result Value Ref Range    Sodium 136 136 - 145 mmol/L    Potassium 4.0 3.5 - 5.1 mmol/L    Chloride 88 (L) 97 - 108 mmol/L    CO2 >45 (HH) 21 - 32 mmol/L    Anion gap Cannot be calculated 5 - 15 mmol/L    Glucose 181 (H) 65 - 100 mg/dL    BUN 20 6 - 20 MG/DL    Creatinine 0.77 0.55 - 1.02 MG/DL    BUN/Creatinine ratio 26 (H) 12 - 20      GFR est AA >60 >60 ml/min/1.73m2    GFR est non-AA >60 >60 ml/min/1.73m2    Calcium 8.7 8.5 - 10.1 MG/DL    Bilirubin, total 0.6 0.2 - 1.0 MG/DL    ALT (SGPT) 23 12 - 78 U/L    AST (SGOT) 33 15 - 37 U/L    Alk.  phosphatase 659 (H) 45 - 117 U/L    Protein, total 7.3 6.4 - 8.2 g/dL    Albumin 2.3 (L) 3.5 - 5.0 g/dL    Globulin 5.0 (H) 2.0 - 4.0 g/dL    A-G Ratio 0.5 (L) 1.1 - 2.2         Imaging:  I have personally reviewed the patients radiographs and have reviewed the reports:  CXR reviewed        Salvador Madrigal MD

## 2022-05-25 NOTE — PROGRESS NOTES
Hospitalist Progress Note    NAME: Didi Lance   :  1960   MRN:  [id number]     Subjective:   Daily Progress Note: 2022 12:06 PM      Chief complaint: Transferred out of ICU  with after treated for respiratory failure  Patient seen and examined, chart was reviewed. Patient currently on nasal cannula oxygen. Pulmonary consulted to help with trilogy settings. She developed a rash last night but improved with Benadryl.     Current Facility-Administered Medications   Medication Dose Route Frequency    diphenhydrAMINE-zinc acetate 2%-0.1% (BENADRYL) cream   Topical QID    lidocaine 4 % patch 1 Patch  1 Patch TransDERmal Q24H    diphenhydrAMINE (BENADRYL) capsule 25 mg  25 mg Oral Q6H PRN    sodium chloride (NS) flush 5-40 mL  5-40 mL IntraVENous Q8H    sodium chloride (NS) flush 5-40 mL  5-40 mL IntraVENous PRN    acetaminophen (TYLENOL) tablet 650 mg  650 mg Oral Q6H PRN    Or    acetaminophen (TYLENOL) suppository 650 mg  650 mg Rectal Q6H PRN    polyethylene glycol (MIRALAX) packet 17 g  17 g Oral DAILY PRN    ondansetron (ZOFRAN ODT) tablet 4 mg  4 mg Oral Q8H PRN    Or    ondansetron (ZOFRAN) injection 4 mg  4 mg IntraVENous Q6H PRN    albuterol-ipratropium (DUO-NEB) 2.5 MG-0.5 MG/3 ML  3 mL Nebulization Q4H PRN    amiodarone (CORDARONE) tablet 200 mg  200 mg Oral DAILY    gabapentin (NEURONTIN) capsule 100 mg  100 mg Oral TID    rivaroxaban (XARELTO) tablet 20 mg  20 mg Oral DAILY WITH BREAKFAST    albuterol-ipratropium (DUO-NEB) 2.5 MG-0.5 MG/3 ML  3 mL Nebulization QID RT    methylPREDNISolone (PF) (Solu-MEDROL) injection 60 mg  60 mg IntraVENous Q8H    DAPTOmycin (CUBICIN) 700 mg in 0.9% sodium chloride 50 mL IVPB  8 mg/kg (Adjusted) IntraVENous Q24H    nystatin (MYCOSTATIN) 100,000 unit/gram powder   Topical BID    piperacillin-tazobactam (ZOSYN) 3.375 g in 0.9% sodium chloride (MBP/ADV) 100 mL MBP  3.375 g IntraVENous Q8H          Objective:     Visit Vitals  BP (!) 127/55 (BP 1 Location: Right lower arm, BP Patient Position: At rest;Lying)   Pulse 96   Temp 98.6 °F (37 °C)   Resp 17   Wt 135 kg (297 lb 9.9 oz)   SpO2 97%   BMI 52.72 kg/m²    O2 Flow Rate (L/min): 4 l/min O2 Device: Nasal cannula    Temp (24hrs), Av °F (37.2 °C), Min:98.3 °F (36.8 °C), Max:99.4 °F (37.4 °C)        PHYSICAL EXAM:  General obese  Neck Short  CVS RRR  Respiratory symmetric expansion  Abdomen morbidly obese, soft  Extremities nonpitting edema  Neuro AAOx3  Psych anxious  Skin erythematous rash on chest and upper extremities      Data Review    Recent Results (from the past 24 hour(s))   MAGNESIUM    Collection Time: 22  3:27 AM   Result Value Ref Range    Magnesium 2.1 1.6 - 2.4 mg/dL   PHOSPHORUS    Collection Time: 22  3:27 AM   Result Value Ref Range    Phosphorus 2.8 2.6 - 4.7 MG/DL   CBC W/O DIFF    Collection Time: 22  3:27 AM   Result Value Ref Range    WBC 10.7 3.6 - 11.0 K/uL    RBC 3.28 (L) 3.80 - 5.20 M/uL    HGB 9.9 (L) 11.5 - 16.0 g/dL    HCT 34.4 (L) 35.0 - 47.0 %    .9 (H) 80.0 - 99.0 FL    MCH 30.2 26.0 - 34.0 PG    MCHC 28.8 (L) 30.0 - 36.5 g/dL    RDW 15.6 (H) 11.5 - 14.5 %    PLATELET 162 928 - 253 K/uL    MPV 11.0 8.9 - 12.9 FL    NRBC 0.2 (H) 0  WBC    ABSOLUTE NRBC 0.02 (H) 0.00 - 1.06 K/uL   METABOLIC PANEL, COMPREHENSIVE    Collection Time: 22  3:27 AM   Result Value Ref Range    Sodium 136 136 - 145 mmol/L    Potassium 4.0 3.5 - 5.1 mmol/L    Chloride 88 (L) 97 - 108 mmol/L    CO2 >45 (HH) 21 - 32 mmol/L    Anion gap Cannot be calculated 5 - 15 mmol/L    Glucose 181 (H) 65 - 100 mg/dL    BUN 20 6 - 20 MG/DL    Creatinine 0.77 0.55 - 1.02 MG/DL    BUN/Creatinine ratio 26 (H) 12 - 20      GFR est AA >60 >60 ml/min/1.73m2    GFR est non-AA >60 >60 ml/min/1.73m2    Calcium 8.7 8.5 - 10.1 MG/DL    Bilirubin, total 0.6 0.2 - 1.0 MG/DL    ALT (SGPT) 23 12 - 78 U/L    AST (SGOT) 33 15 - 37 U/L    Alk.  phosphatase 659 (H) 45 - 117 U/L    Protein, total 7.3 6.4 - 8.2 g/dL    Albumin 2.3 (L) 3.5 - 5.0 g/dL    Globulin 5.0 (H) 2.0 - 4.0 g/dL    A-G Ratio 0.5 (L) 1.1 - 2.2       No results found for this visit on 05/23/22. All Micro Results     Procedure Component Value Units Date/Time    CULTURE, BLOOD, PAIRED [[id number]] Collected: 05/23/22 1447    Order Status: Completed Specimen: Blood Updated: 05/25/22 0728     Special Requests: NO SPECIAL REQUESTS        Culture result: NO GROWTH 2 DAYS       COVID-19 RAPID TEST [[id number]] Collected: 05/23/22 1447    Order Status: Completed Specimen: Nasopharyngeal Updated: 05/23/22 1549     Specimen source Nasopharyngeal        COVID-19 rapid test Not detected        Comment: Rapid Abbott ID Now       Rapid NAAT:  The specimen is NEGATIVE for SARS-CoV-2, the novel coronavirus associated with COVID-19. Negative results should be treated as presumptive and, if inconsistent with clinical signs and symptoms or necessary for patient management, should be tested with an alternative molecular assay. Negative results do not preclude SARS-CoV-2 infection and should not be used as the sole basis for patient management decisions. This test has been authorized by the FDA under an Emergency Use Authorization (EUA) for use by authorized laboratories. Fact sheet for Healthcare Providers: ConventionUpdate.co.nz  Fact sheet for Patients: ConventionUpdate.co.nz       Methodology: Isothermal Nucleic Acid Amplification                Radiology reports and films for the last 24 hours have been reviewed.     Assessment/Plan:   Acute on chronic hypoxic/hypercapnic respiratory failure-resolved  Left-sided pleural effusion- not enough fluid for ultrasound-guided thoracentesis  JOSLYN/OHS on trilogy  COPD on O2  Clinically symptoms improved  Continue oxygen taper  Continue IV steroid taper  Continue trilogy as per pulmonary  Follow clinical course    Left hip fracture displaced  age undetermined  ? left hip osteomyelitis/septic arthritis   Bilateral buttock and sacral wounds    -[facility] evaluated no surgical intervention planned  -S/p  Aspiration?/Bx by IR, culture negative. -ID recommends daptomycin and cefepime on discharge last admit.  End date 6/16.  No statin while on daptomycin. ( ID follow-up was planned on 6/30 as OP. Weekly CBC, CMP, CK & ESR/CRP every other week. Resume Lipitor after completing ABx.)  *Cefepime was changed to IV Zosyn secondary to skin rash        Chr DHF/PAF: -Continue lasix,amiodarone and xarelto. Chronic microcytic anemia-monitor CBC periodically, was on folic acid  Steroid induced hyperglycemia-A1c level 5.4 on 4/21, ISS  HTN controlled.  Cont' PTA meds. Morbid obesity/Impaired mobility/functional quadriplegia-supportive care  Drug rash- suspected from cefepime now changed to Zosyn.  Cont steroids    ADFC  DVT PRx on Xarelto  NOK   Dispo: Back to SNF once medically stable 48hrs       Signed By: Leonel Mccurdy MD     May 25, 2022

## 2022-05-25 NOTE — PROGRESS NOTES
TRANSFER - IN REPORT:    Verbal report received from Deandre abraham RN(name) on [place]  being received from CCU(unit) for routine progression of care      Report consisted of patients Situation, Background, Assessment and   Recommendations(SBAR). Information from the following report(s) SBAR, Kardex, ED Summary, Intake/Output and MAR was reviewed with the receiving nurse. Opportunity for questions and clarification was provided. Assessment completed upon patients arrival to unit and care assumed. Off-going nurse informed this RN patient had rash on admission, but patient was not complaining of itching or pain. During assessment patient reports itching. Messaged NP for Benadryl. NP entered orders. 1903: Lab called for critical CO2: >45. Notified NP. Reached out to PICC team, unable to obtain AM labs, patient has ultrasound guided IV.

## 2022-05-25 NOTE — PROGRESS NOTES
Received notification from bedside RN about patient with regards to: generalized body rash complaining of itching, requesting medication for relief  VS: /64, HR 96, RR 25, O2 sat 94% on NC 3 L    Intervention given: Benadryl 25 mg IV x 1 dose, Benadryl PO PRN ordered

## 2022-05-25 NOTE — PROGRESS NOTES
Care Management Interventions  PCP Verified by CM: Yes ([facility] saw patient 10/21)  Palliative Care Criteria Met (RRAT>21 & CHF Dx)?: No  Mode of Transport at Discharge: BLS  Transition of Care Consult (CM Consult): Discharge Planning,SNF  Partner SNF: No  Reason Why Partner SNF Not Chosen:  (At [facility] prior to this admission)  MyChart Signup: No  Discharge Durable Medical Equipment: No  Physical Therapy Consult: No  Occupational Therapy Consult: No  Speech Therapy Consult: No  Support Systems: Child(dejuan),Inpatient [address]  Confirm Follow Up Transport: Other (see comment) (Squad)   Resource Information Provided?: No  Discharge Location  Patient Expects to be Discharged to[de-identified] Skilled nursing facility     Readmission Assessment  Number of days since last admission?: 1-7 days  Previous disposition: SNF  Who is being interviewed?: Caregiver  What was the patient's/caregiver's perception as to why they think they needed to return back to the hospital?: Other (Comment) (None of the reasons cited/different diagnosis)  Did you visit your Primary Care Physician after you left the hospital, before you returned this time?: No  Why weren't you able to visit your PCP?: Other (Comment) (In a facility - [place] [address] and [address] [employer])  Did you see a specialist, such as Cardiac, Pulmonary, Orthopedic Physician, etc. after you left the hospital?: Other (Comment) (Unknown)  Who advised the patient to return to the hospital?: Skilled Unit  Does the patient report anything that got in the way of taking their medications?: No  In our efforts to provide the best possible care to you and others like you, can you think of anything that we could have done to help you after you left the hospital the first time, so that you might not have needed to return so soon?: Other (Comment) (N/A)     Ms. Colton Beck was readmitted to [address] [place] [address] under Inpatient Status 5/23/22 with Respiratory Failure.  She had been discharged from [facility] last week (5/17/22) also as an Inpatient with Hip Osteomyelitis. Upon discharge from this prior admission Ms. Kimberlee Carcamo was transferred to [facility] and [address] [place] [address]. This Care Management intake was done off-site by DOROTHY Meadows, Care Management, [address] [place] [address] in [place] [address]. I tried calling Ms. Kimberlee Carcamo in her hospital room but was not able to reach her. I called her daughter Dimple Mojica - [phone]. Ms. Kimberlee Carcamo has an Advance Directive on file and this daughter is her primary health care decision maker. Ms. Kimberlee Carcamo lives with her daughter, Dangelo Lazo and granddaughter in [place]. She is bedridden and her daughter is the primary caregiver. She uses oxygen and has a CPAP at home. Ms. Kmiberlee Carcamo has Medicare and Medicaid. She is not receiving Medicaid personal care/community based care. I advised daughter that she could likely get paid as the Medicaid caregiver through the consumer directed services option, the other option being care through an agency. Plan is that daughter will call her Medicaid , [facility], to discern if a UAI has been done. If not I advised her to contact me as the CM department at [facility] is able to complete the UAI documentation. As Ms. Kimberlee Carcamo resides at home and is bedridden, she uses the [facility] service. Her daughter cannot remember the name of the physician kimberly her saw her in October of last year. She was due to have another appointment, but this was cancelled due to the most recent last hospitalization. [facility] Care Management advised IM notification needed.      Advance Care Planning     General Advance Care Planning (ACP) Conversation      Date of Conversation: 05/25/22  Conducted with: Patient with Decision Making Capacity    Healthcare Decision Maker:     Primary Decision Maker: Gery Osler - Child - [phone]    Secondary Decision Maker: Marisabel Loving - Sister - [phone]  Click here to complete [address] including selection of the Healthcare Decision Maker Relationship (ie \"Primary\")          Content/Action Overview: Has ACP document(s) on file - reflects the patient's care preferences  Reviewed DNR/DNI and patient elects Full Code (Attempt Resuscitation)      Length of Voluntary ACP Conversation in minutes:  <16 minutes (Non-Billable)    Donny Haas     Reason for Readmission:   Acute Respiratory Failure           RUR Score/Risk Level: 20%        PCP: First and Last name:  Unknown   Name of Practice: [facility], Patient homebound   Are you a current patient: Yes/No: YES   Approximate date of last visit: 10/21   Can you participate in a virtual visit with your PCP:   N/A Physician makes home visits      Is a Care Conference indicated: NO      Did you attend your follow up appointment (s): If not, why not: N/A         Resources/supports as identified by patient/family:   N/A       Top Challenges facing patient (as identified by patient/family and CM): Finances/Medication cost?    N/A   Transportation      N/A  Support system or lack thereof? N/A  Living arrangements? N/A      Self-care/ADLs/Cognition? N/A         Current Advanced Directive/Advance Care Plan:  YES           Plan for utilizing home health:   NO. Plan is to return to [facility]             Transition of Care Plan:    Based on readmission, the patient's previous Plan of Care   has been evaluated and/or modified.  The current Transition of Care Plan is:  [facility]      Reason for Admission:  Acute Respiratory Failure                     RUR Score: 20% High                    Plan for utilizing home health:  NO        PCP: First and Last name:  [facility]   Name of Practice:    Are you a current patient: Yes/No: YES   Approximate date of last visit: 10/22   Can you participate in a virtual visit with your PCP:          N/A       Current Advanced Directive/Advance Care Plan: Full Code      Healthcare Decision Maker:   Click here to complete HealthCare Decision Makers including selection of the Healthcare Decision Maker Relationship (ie \"Primary\")             Primary Decision MakerAgómez Greyson Vazquez - [phone]    Secondary Decision Maker: [facility] - [phone]                  Transition of Care Plan:  Zaid Leal5

## 2022-05-26 LAB
ALBUMIN SERPL-MCNC: 2.2 G/DL (ref 3.5–5)
ALBUMIN/GLOB SERPL: 0.5 {RATIO} (ref 1.1–2.2)
ALP SERPL-CCNC: 543 U/L (ref 45–117)
ALT SERPL-CCNC: 29 U/L (ref 12–78)
ANION GAP SERPL CALC-SCNC: 4 MMOL/L (ref 5–15)
AST SERPL-CCNC: 48 U/L (ref 15–37)
BILIRUB SERPL-MCNC: 0.5 MG/DL (ref 0.2–1)
BUN SERPL-MCNC: 24 MG/DL (ref 6–20)
BUN/CREAT SERPL: 37 (ref 12–20)
CALCIUM SERPL-MCNC: 8.3 MG/DL (ref 8.5–10.1)
CHLORIDE SERPL-SCNC: 88 MMOL/L (ref 97–108)
CO2 SERPL-SCNC: 45 MMOL/L (ref 21–32)
CREAT SERPL-MCNC: 0.65 MG/DL (ref 0.55–1.02)
ERYTHROCYTE [DISTWIDTH] IN BLOOD BY AUTOMATED COUNT: 15.4 % (ref 11.5–14.5)
GLOBULIN SER CALC-MCNC: 4.4 G/DL (ref 2–4)
GLUCOSE SERPL-MCNC: 180 MG/DL (ref 65–100)
HCT VFR BLD AUTO: 32.2 % (ref 35–47)
HGB BLD-MCNC: 9.4 G/DL (ref 11.5–16)
MAGNESIUM SERPL-MCNC: 2.4 MG/DL (ref 1.6–2.4)
MCH RBC QN AUTO: 30.7 PG (ref 26–34)
MCHC RBC AUTO-ENTMCNC: 29.2 G/DL (ref 30–36.5)
MCV RBC AUTO: 105.2 FL (ref 80–99)
NRBC # BLD: 0.02 K/UL (ref 0–0.01)
NRBC BLD-RTO: 0.2 PER 100 WBC
PHOSPHATE SERPL-MCNC: 2.9 MG/DL (ref 2.6–4.7)
PLATELET # BLD AUTO: 157 K/UL (ref 150–400)
PMV BLD AUTO: 11.6 FL (ref 8.9–12.9)
POTASSIUM SERPL-SCNC: 3.6 MMOL/L (ref 3.5–5.1)
PROT SERPL-MCNC: 6.6 G/DL (ref 6.4–8.2)
RBC # BLD AUTO: 3.06 M/UL (ref 3.8–5.2)
SODIUM SERPL-SCNC: 137 MMOL/L (ref 136–145)
WBC # BLD AUTO: 9.3 K/UL (ref 3.6–11)

## 2022-05-26 PROCEDURE — 85027 COMPLETE CBC AUTOMATED: CPT

## 2022-05-26 PROCEDURE — 83735 ASSAY OF MAGNESIUM: CPT

## 2022-05-26 PROCEDURE — 74011000250 HC RX REV CODE- 250: Performed by: HOSPITALIST

## 2022-05-26 PROCEDURE — 74011250636 HC RX REV CODE- 250/636: Performed by: HOSPITALIST

## 2022-05-26 PROCEDURE — 94640 AIRWAY INHALATION TREATMENT: CPT

## 2022-05-26 PROCEDURE — 74011000258 HC RX REV CODE- 258: Performed by: HOSPITALIST

## 2022-05-26 PROCEDURE — 94660 CPAP INITIATION&MGMT: CPT

## 2022-05-26 PROCEDURE — 77030041074 HC DRSG AG OPTIFRM MDII -A

## 2022-05-26 PROCEDURE — 84100 ASSAY OF PHOSPHORUS: CPT

## 2022-05-26 PROCEDURE — 77010033678 HC OXYGEN DAILY

## 2022-05-26 PROCEDURE — 74011250637 HC RX REV CODE- 250/637: Performed by: HOSPITALIST

## 2022-05-26 PROCEDURE — 74011000250 HC RX REV CODE- 250: Performed by: NURSE PRACTITIONER

## 2022-05-26 PROCEDURE — 80053 COMPREHEN METABOLIC PANEL: CPT

## 2022-05-26 PROCEDURE — 36415 COLL VENOUS BLD VENIPUNCTURE: CPT

## 2022-05-26 PROCEDURE — 65270000046 HC RM TELEMETRY

## 2022-05-26 RX ORDER — AMMONIUM LACTATE 12 G/100G
LOTION TOPICAL 2 TIMES DAILY
Status: DISCONTINUED | OUTPATIENT
Start: 2022-05-26 | End: 2022-06-17 | Stop reason: HOSPADM

## 2022-05-26 RX ORDER — MICONAZOLE NITRATE 20 MG/G
CREAM TOPICAL 2 TIMES DAILY
Status: DISCONTINUED | OUTPATIENT
Start: 2022-05-26 | End: 2022-06-14

## 2022-05-26 RX ORDER — METOPROLOL TARTRATE 5 MG/5ML
5 INJECTION INTRAVENOUS ONCE
Status: COMPLETED | OUTPATIENT
Start: 2022-05-26 | End: 2022-05-26

## 2022-05-26 RX ADMIN — DIPHENHYDRAMINE HYDROCHLORIDE, ZINC ACETATE: 2; .1 CREAM TOPICAL at 13:26

## 2022-05-26 RX ADMIN — ACETAZOLAMIDE SODIUM 500 MG: 500 INJECTION, POWDER, LYOPHILIZED, FOR SOLUTION INTRAVENOUS at 18:24

## 2022-05-26 RX ADMIN — METHYLPREDNISOLONE SODIUM SUCCINATE 50 MG: 125 INJECTION, POWDER, FOR SOLUTION INTRAMUSCULAR; INTRAVENOUS at 13:24

## 2022-05-26 RX ADMIN — ACETAZOLAMIDE SODIUM 500 MG: 500 INJECTION, POWDER, LYOPHILIZED, FOR SOLUTION INTRAVENOUS at 11:38

## 2022-05-26 RX ADMIN — PIPERACILLIN AND TAZOBACTAM 3.38 G: 3; .375 INJECTION, POWDER, LYOPHILIZED, FOR SOLUTION INTRAVENOUS at 05:42

## 2022-05-26 RX ADMIN — PIPERACILLIN AND TAZOBACTAM 3.38 G: 3; .375 INJECTION, POWDER, LYOPHILIZED, FOR SOLUTION INTRAVENOUS at 13:24

## 2022-05-26 RX ADMIN — GABAPENTIN 100 MG: 100 CAPSULE ORAL at 16:21

## 2022-05-26 RX ADMIN — SODIUM CHLORIDE, PRESERVATIVE FREE 10 ML: 5 INJECTION INTRAVENOUS at 05:43

## 2022-05-26 RX ADMIN — DAPTOMYCIN 700 MG: 500 INJECTION, POWDER, LYOPHILIZED, FOR SOLUTION INTRAVENOUS at 11:37

## 2022-05-26 RX ADMIN — IPRATROPIUM BROMIDE AND ALBUTEROL SULFATE 3 ML: .5; 3 SOLUTION RESPIRATORY (INHALATION) at 07:52

## 2022-05-26 RX ADMIN — SODIUM CHLORIDE, PRESERVATIVE FREE 10 ML: 5 INJECTION INTRAVENOUS at 21:54

## 2022-05-26 RX ADMIN — GABAPENTIN 100 MG: 100 CAPSULE ORAL at 21:54

## 2022-05-26 RX ADMIN — IPRATROPIUM BROMIDE AND ALBUTEROL SULFATE 3 ML: .5; 3 SOLUTION RESPIRATORY (INHALATION) at 15:59

## 2022-05-26 RX ADMIN — METHYLPREDNISOLONE SODIUM SUCCINATE 50 MG: 125 INJECTION, POWDER, FOR SOLUTION INTRAMUSCULAR; INTRAVENOUS at 05:42

## 2022-05-26 RX ADMIN — MICONAZOLE NITRATE: 20 CREAM TOPICAL at 21:00

## 2022-05-26 RX ADMIN — NYSTATIN: 100000 POWDER TOPICAL at 09:05

## 2022-05-26 RX ADMIN — Medication: at 18:24

## 2022-05-26 RX ADMIN — METHYLPREDNISOLONE SODIUM SUCCINATE 50 MG: 125 INJECTION, POWDER, FOR SOLUTION INTRAMUSCULAR; INTRAVENOUS at 21:54

## 2022-05-26 RX ADMIN — ACETAMINOPHEN 650 MG: 325 TABLET ORAL at 13:24

## 2022-05-26 RX ADMIN — SODIUM CHLORIDE, PRESERVATIVE FREE 10 ML: 5 INJECTION INTRAVENOUS at 13:25

## 2022-05-26 RX ADMIN — RIVAROXABAN 20 MG: 20 TABLET, FILM COATED ORAL at 09:03

## 2022-05-26 RX ADMIN — DIPHENHYDRAMINE HYDROCHLORIDE, ZINC ACETATE: 2; .1 CREAM TOPICAL at 09:04

## 2022-05-26 RX ADMIN — GABAPENTIN 100 MG: 100 CAPSULE ORAL at 09:03

## 2022-05-26 RX ADMIN — IPRATROPIUM BROMIDE AND ALBUTEROL SULFATE 3 ML: .5; 3 SOLUTION RESPIRATORY (INHALATION) at 19:36

## 2022-05-26 RX ADMIN — DIPHENHYDRAMINE HYDROCHLORIDE, ZINC ACETATE: 2; .1 CREAM TOPICAL at 21:55

## 2022-05-26 RX ADMIN — AMIODARONE HYDROCHLORIDE 200 MG: 200 TABLET ORAL at 09:03

## 2022-05-26 RX ADMIN — METOPROLOL TARTRATE 5 MG: 5 INJECTION INTRAVENOUS at 16:29

## 2022-05-26 RX ADMIN — DIPHENHYDRAMINE HYDROCHLORIDE, ZINC ACETATE: 2; .1 CREAM TOPICAL at 18:24

## 2022-05-26 RX ADMIN — IPRATROPIUM BROMIDE AND ALBUTEROL SULFATE 3 ML: .5; 3 SOLUTION RESPIRATORY (INHALATION) at 11:32

## 2022-05-26 RX ADMIN — PIPERACILLIN AND TAZOBACTAM 3.38 G: 3; .375 INJECTION, POWDER, LYOPHILIZED, FOR SOLUTION INTRAVENOUS at 21:54

## 2022-05-26 NOTE — WOUND CARE
Wound care consult for the pressure injury that has been receiving on-going treatment Rx's by our wound care team since she was first admitted her a few weeks ago. Pt. Was in a bad bed surface at home and developed skin issues with pressure injuries to the buttocks, coccyx and upper back. The skin on the Pressure injury is pink and moist and now healing well. Still needs moist wound care. This will be done with Xeroform gauze. The mid sacrum / coccyx wound is open and bleeding and patient has several skin tears from friction / shear, including her upper back which is calloused skin with dyschromia. Treatment / plan: Moist wound care with Xeroform and foam dressing to the sacrum and left buttocks. Orders written for this today. Float the heels and keep the patient turned with a adequate turn (this may take two pillows to accomplish).    Giovana Castro RN, BSN, [employer]

## 2022-05-26 NOTE — RT PROTOCOL NOTE
ADULT PROTOCOL: JET AEROSOL ASSESSMENT    Patient  Josie Schaefer     57 y.o.   female     5/26/2022  1:09 AM    Breath Sounds Pre Procedure: Right Breath Sounds: Diminished                               Left Breath Sounds: Diminished    Breath Sounds Post Procedure: Right Breath Sounds: Diminished                                 Left Breath Sounds: Diminished    Breathing pattern: Pre procedure Breathing Pattern: Regular          Post procedure Breathing Pattern: Regular    Heart Rate: Pre procedure Pulse: 96           Post procedure Pulse: 96    Resp Rate: Pre procedure Respirations: 18           Post procedure Respirations: 17      Oxygen: O2 Device: CPAP mask   Flow rate (L/min) 4     Changed: NO    SpO2: Pre procedure SpO2: 98 %   with oxygen              Post procedure SpO2: 95 %  with oxygen    Nebulizer Therapy: Current medications Aerosolized Medications: DuoNeb      Changed: NO    Smoking History: former smoker    Problem List:   Patient Active Problem List   Diagnosis Code    COVID-19 ruled out Z20.822    A-fib ([facility] I48.91    S/P cardiac cath Z98.890    Mixed hyperlipidemia E78.2    Morbid obesity (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

## 2022-05-26 NOTE — PROGRESS NOTES
Report received from Arthur Luciano sent to Dr. Kilo Pinto in regards to patient having multiple loose stools through the night and today. Asked if there was anything we could give her    1141- orders received to continue to monitor and hold stool softners    1900- End of Shift Note    Bedside shift change report given to Silva Ramirez RN (oncoming nurse) by Velma Cavazos RN (offgoing nurse).   Report included the following information SBAR, Kardex and MAR    Shift worked:  7a-7p     Shift summary and any significant changes:     frequent loose stool, MD aware     Concerns for physician to address:       Zone phone for oncoming shift:              Velma Cavazos RN

## 2022-05-26 NOTE — PROGRESS NOTES
Transition of Care Plan:    RUR: 21% - high risk  Disposition: Return to [place]  Follow up appointments: PCP, Specialist  DME needed: depends on disposition. Transportation at Discharge: CM to arrange BLS  [place] or means to access home:   Dtr has access. IM Medicare Letter: 2nd  Medicare letter to receive prior to discharge. Is patient a BCPI-A Bundle:  No         If yes, was Bundle Letter given?:  N/A  Is patient a [place] and connected with the [place]? No             If yes, was [facility] transfer form completed and [facility] notified? Caregiver Contact: Byron Coates, daughter, [phone]  Discharge Caregiver contacted prior to discharge? CM to contact prior to discharge. Care Conference needed?:  No    Initial Note 09:35 am: In review of chart, pt is not medically stable for discharge. Unit CM will continue to follow.      Indu Monroe RN, BSN, [address] [facility] Care Manager  [phone]

## 2022-05-26 NOTE — PROGRESS NOTES
End of Shift Note    Bedside shift change report given to [address] [place] [address] (oncoming nurse) by Dante Kate RN (offgoing nurse). Report included the following information SBAR, Kardex and MAR    Shift worked:  [id number]     Shift summary and any significant changes:          Concerns for physician to address:     Zone phone for oncoming shift:          Activity:  Activity Level: Bed Rest  Number times ambulated in hallways past shift: 0  Number of times OOB to chair past shift: 0    Cardiac:   Cardiac Monitoring: Yes      Cardiac Rhythm: Sinus Rhythm    Access:   Current line(s): PIV and PICC     Genitourinary:   Urinary status: voiding, incontinent and external catheter    Respiratory:   O2 Device: CPAP mask  Chronic home O2 use?: YES  Incentive spirometer at bedside: YES       GI:  Last Bowel Movement Date: 05/23/22  Current diet:  ADULT DIET Easy to Chew; 4 carb choices (60 gm/meal); No Salt Added (3-4 gm)  Passing flatus: YES  Tolerating current diet: YES       Pain Management:   Patient states pain is manageable on current regimen: YES    Skin:  Gaston Score: 12  Interventions: turn team, speciality bed, foam dressing and internal/external urinary devices    Patient Safety:  Fall Score:  Total Score: 2  Interventions: bed/chair alarm       Length of Stay:  Expected LOS: 3d 14h  Actual LOS: 3      Dante Kate RN

## 2022-05-26 NOTE — PROGRESS NOTES
Problem: Pressure Injury - Risk of  Goal: *Prevention of pressure injury  Description: Document Gaston Scale and appropriate interventions in the flowsheet. Outcome: Progressing Towards Goal  Note: Pressure Injury Interventions:  Sensory Interventions: Assess changes in LOC,Maintain/enhance activity level,Minimize linen layers,Turn and reposition approx. every two hours (pillows and wedges if needed),Pressure redistribution bed/mattress (bed type)    Moisture Interventions: Absorbent underpads,Check for incontinence Q2 hours and as needed,Internal/External urinary devices,Maintain skin hydration (lotion/cream),Minimize layers,Apply protective barrier, creams and emollients    Activity Interventions: Pressure redistribution bed/mattress(bed type),PT/OT evaluation    Mobility Interventions: Pressure redistribution bed/mattress (bed type),Turn and reposition approx. every two hours(pillow and wedges)    Nutrition Interventions: Document food/fluid/supplement intake,Discuss nutritional consult with provider    Friction and Shear Interventions: Apply protective barrier, creams and emollients,Foam dressings/transparent film/skin sealants,Minimize layers,Transferring/repositioning devices                Problem: Patient Education: Go to Patient Education Activity  Goal: Patient/Family Education  Outcome: Progressing Towards Goal     Problem: Falls - Risk of  Goal: *Absence of Falls  Description: Document Anna Fall Risk and appropriate interventions in the flowsheet.   Outcome: Progressing Towards Goal  Note: Fall Risk Interventions:            Medication Interventions: Bed/chair exit alarm,Teach patient to arise slowly,Patient to call before getting OOB    Elimination Interventions: Bed/chair exit alarm,Call light in reach,Patient to call for help with toileting needs              Problem: Patient Education: Go to Patient Education Activity  Goal: Patient/Family Education  Outcome: Progressing Towards Goal

## 2022-05-26 NOTE — PROGRESS NOTES
Hospitalist Progress Note    NAME: Didi Lance   :  1960   MRN:  [id number]     Subjective:   Daily Progress Note: 2022 12:06 PM      Chief complaint: Transferred out of ICU  with after treated for respiratory failure  Patient seen and examined, chart was reviewed. Patient remains on high-dose IV steroids, patient improving respiratory wise. Denies any pain, nausea, vomiting at this time. Rash is improving.     Current Facility-Administered Medications   Medication Dose Route Frequency    diphenhydrAMINE-zinc acetate 2%-0.1% (BENADRYL) cream   Topical QID    methylPREDNISolone (PF) (Solu-MEDROL) injection 50 mg  50 mg IntraVENous Q8H    lidocaine 4 % patch 2 Patch  2 Patch TransDERmal Q24H    diphenhydrAMINE (BENADRYL) capsule 25 mg  25 mg Oral Q6H PRN    sodium chloride (NS) flush 5-40 mL  5-40 mL IntraVENous Q8H    sodium chloride (NS) flush 5-40 mL  5-40 mL IntraVENous PRN    acetaminophen (TYLENOL) tablet 650 mg  650 mg Oral Q6H PRN    Or    acetaminophen (TYLENOL) suppository 650 mg  650 mg Rectal Q6H PRN    polyethylene glycol (MIRALAX) packet 17 g  17 g Oral DAILY PRN    ondansetron (ZOFRAN ODT) tablet 4 mg  4 mg Oral Q8H PRN    Or    ondansetron (ZOFRAN) injection 4 mg  4 mg IntraVENous Q6H PRN    albuterol-ipratropium (DUO-NEB) 2.5 MG-0.5 MG/3 ML  3 mL Nebulization Q4H PRN    amiodarone (CORDARONE) tablet 200 mg  200 mg Oral DAILY    gabapentin (NEURONTIN) capsule 100 mg  100 mg Oral TID    rivaroxaban (XARELTO) tablet 20 mg  20 mg Oral DAILY WITH BREAKFAST    albuterol-ipratropium (DUO-NEB) 2.5 MG-0.5 MG/3 ML  3 mL Nebulization QID RT    DAPTOmycin (CUBICIN) 700 mg in 0.9% sodium chloride 50 mL IVPB  8 mg/kg (Adjusted) IntraVENous Q24H    nystatin (MYCOSTATIN) 100,000 unit/gram powder   Topical BID    piperacillin-tazobactam (ZOSYN) 3.375 g in 0.9% sodium chloride (MBP/ADV) 100 mL MBP  3.375 g IntraVENous Q8H          Objective:     Visit Vitals  BP (!) 146/68 (BP 1 Location: Right lower arm, BP Patient Position: At rest)   Pulse 83   Temp 98.1 °F (36.7 °C)   Resp 20   Wt 135 kg (297 lb 9.9 oz)   SpO2 98%   BMI 52.72 kg/m²    O2 Flow Rate (L/min): 4 l/min O2 Device: Nasal cannula    Temp (24hrs), Av.1 °F (36.7 °C), Min:97.7 °F (36.5 °C), Max:98.7 °F (37.1 °C)        PHYSICAL EXAM:  General obese  Neck Short  CVS RRR  Respiratory symmetric expansion  Abdomen morbidly obese, soft  Extremities nonpitting edema  Neuro AAOx3  Psych anxious  Skin erythematous rash on chest and upper extremities improving      Data Review    Recent Results (from the past 24 hour(s))   MAGNESIUM    Collection Time: 22  2:25 AM   Result Value Ref Range    Magnesium 2.4 1.6 - 2.4 mg/dL   PHOSPHORUS    Collection Time: 22  2:25 AM   Result Value Ref Range    Phosphorus 2.9 2.6 - 4.7 MG/DL   CBC W/O DIFF    Collection Time: 22  2:25 AM   Result Value Ref Range    WBC 9.3 3.6 - 11.0 K/uL    RBC 3.06 (L) 3.80 - 5.20 M/uL    HGB 9.4 (L) 11.5 - 16.0 g/dL    HCT 32.2 (L) 35.0 - 47.0 %    .2 (H) 80.0 - 99.0 FL    MCH 30.7 26.0 - 34.0 PG    MCHC 29.2 (L) 30.0 - 36.5 g/dL    RDW 15.4 (H) 11.5 - 14.5 %    PLATELET 702 960 - 358 K/uL    MPV 11.6 8.9 - 12.9 FL    NRBC 0.2 (H) 0  WBC    ABSOLUTE NRBC 0.02 (H) 0.00 - 4.92 K/uL   METABOLIC PANEL, COMPREHENSIVE    Collection Time: 22  2:25 AM   Result Value Ref Range    Sodium 137 136 - 145 mmol/L    Potassium 3.6 3.5 - 5.1 mmol/L    Chloride 88 (L) 97 - 108 mmol/L    CO2 45 (HH) 21 - 32 mmol/L    Anion gap 4 (L) 5 - 15 mmol/L    Glucose 180 (H) 65 - 100 mg/dL    BUN 24 (H) 6 - 20 MG/DL    Creatinine 0.65 0.55 - 1.02 MG/DL    BUN/Creatinine ratio 37 (H) 12 - 20      GFR est AA >60 >60 ml/min/1.73m2    GFR est non-AA >60 >60 ml/min/1.73m2    Calcium 8.3 (L) 8.5 - 10.1 MG/DL    Bilirubin, total 0.5 0.2 - 1.0 MG/DL    ALT (SGPT) 29 12 - 78 U/L    AST (SGOT) 48 (H) 15 - 37 U/L    Alk.  phosphatase 543 (H) 45 - 117 U/L Protein, total 6.6 6.4 - 8.2 g/dL    Albumin 2.2 (L) 3.5 - 5.0 g/dL    Globulin 4.4 (H) 2.0 - 4.0 g/dL    A-G Ratio 0.5 (L) 1.1 - 2.2       No results found for this visit on 05/23/22. All Micro Results     Procedure Component Value Units Date/Time    CULTURE, BLOOD, PAIRED [[id number]] Collected: 05/23/22 1447    Order Status: Completed Specimen: Blood Updated: 05/26/22 0734     Special Requests: NO SPECIAL REQUESTS        Culture result: NO GROWTH 3 DAYS       COVID-19 RAPID TEST [[id number]] Collected: 05/23/22 1447    Order Status: Completed Specimen: Nasopharyngeal Updated: 05/23/22 1549     Specimen source Nasopharyngeal        COVID-19 rapid test Not detected        Comment: Rapid Abbott ID Now       Rapid NAAT:  The specimen is NEGATIVE for SARS-CoV-2, the novel coronavirus associated with COVID-19. Negative results should be treated as presumptive and, if inconsistent with clinical signs and symptoms or necessary for patient management, should be tested with an alternative molecular assay. Negative results do not preclude SARS-CoV-2 infection and should not be used as the sole basis for patient management decisions. This test has been authorized by the FDA under an Emergency Use Authorization (EUA) for use by authorized laboratories. Fact sheet for Healthcare Providers: ConventionUpdate.co.nz  Fact sheet for Patients: ConventionUpdate.co.nz       Methodology: Isothermal Nucleic Acid Amplification                Radiology reports and films for the last 24 hours have been reviewed.     Assessment/Plan:   Acute on chronic hypoxic/hypercapnic respiratory failure-resolved  Left-sided pleural effusion- not enough fluid for ultrasound-guided thoracentesis  JOSLYN/OHS on trilogy  COPD on O2  Clinically symptoms improved  Continue oxygen taper  Continue IV steroid taper slowly  Continue trilogy as per pulmonary  Follow clinical course  Seen by pulmonary continue current treatment    Left hip fracture displaced  age undetermined  ? left hip osteomyelitis/septic arthritis   Bilateral buttock and sacral wounds    -[facility] evaluated no surgical intervention planned  -S/p  Aspiration?/Bx by IR, culture negative. -ID recommends daptomycin and cefepime on discharge last admit.  End date 6/16.  No statin while on daptomycin. ( ID follow-up was planned on 6/30 as OP. Weekly CBC, CMP, CK & ESR/CRP every other week. Resume Lipitor after completing ABx.)  *Cefepime was changed to IV Zosyn secondary to skin rash suspected from IV cefepime        Metabolic alkalosis- holding Lasix, Diamox 500 IV twice daily for 2 days. Monitor BMP  Chr DHF/PAF: -Continue  amiodarone and xarelto. Lasix held as Met alkalosis  Chronic microcytic anemia-monitor CBC periodically, was on folic acid  Steroid induced hyperglycemia-A1c level 5.4 on 4/21, ISS  HTN controlled.  Cont' PTA meds. Morbid obesity/Impaired mobility/functional quadriplegia-supportive care  Drug rash- suspected from cefepime now changed to Zosyn.  Cont steroids    ADFC  DVT PRx on Xarelto  NOK   Dispo: Back to SNF once medically  48hrs       Signed By: Geraldine Hughes MD     May 26, 2022

## 2022-05-27 LAB
ALBUMIN SERPL-MCNC: 2.2 G/DL (ref 3.5–5)
ALBUMIN/GLOB SERPL: 0.5 {RATIO} (ref 1.1–2.2)
ALP SERPL-CCNC: 472 U/L (ref 45–117)
ALT SERPL-CCNC: 40 U/L (ref 12–78)
ANION GAP SERPL CALC-SCNC: 3 MMOL/L (ref 5–15)
AST SERPL-CCNC: 66 U/L (ref 15–37)
BILIRUB SERPL-MCNC: 0.4 MG/DL (ref 0.2–1)
BNP SERPL-MCNC: 1087 PG/ML
BUN SERPL-MCNC: 20 MG/DL (ref 6–20)
BUN/CREAT SERPL: 30 (ref 12–20)
CALCIUM SERPL-MCNC: 8.6 MG/DL (ref 8.5–10.1)
CHLORIDE SERPL-SCNC: 92 MMOL/L (ref 97–108)
CO2 SERPL-SCNC: 43 MMOL/L (ref 21–32)
CREAT SERPL-MCNC: 0.66 MG/DL (ref 0.55–1.02)
ERYTHROCYTE [DISTWIDTH] IN BLOOD BY AUTOMATED COUNT: 15.2 % (ref 11.5–14.5)
GLOBULIN SER CALC-MCNC: 4.3 G/DL (ref 2–4)
GLUCOSE SERPL-MCNC: 202 MG/DL (ref 65–100)
HCT VFR BLD AUTO: 32.3 % (ref 35–47)
HGB BLD-MCNC: 9.3 G/DL (ref 11.5–16)
MAGNESIUM SERPL-MCNC: 2.6 MG/DL (ref 1.6–2.4)
MCH RBC QN AUTO: 30.5 PG (ref 26–34)
MCHC RBC AUTO-ENTMCNC: 28.8 G/DL (ref 30–36.5)
MCV RBC AUTO: 105.9 FL (ref 80–99)
NRBC # BLD: 0.04 K/UL (ref 0–0.01)
NRBC BLD-RTO: 0.4 PER 100 WBC
PHOSPHATE SERPL-MCNC: 2.7 MG/DL (ref 2.6–4.7)
PLATELET # BLD AUTO: 147 K/UL (ref 150–400)
PMV BLD AUTO: 11.7 FL (ref 8.9–12.9)
POTASSIUM SERPL-SCNC: 2.9 MMOL/L (ref 3.5–5.1)
PROT SERPL-MCNC: 6.5 G/DL (ref 6.4–8.2)
RBC # BLD AUTO: 3.05 M/UL (ref 3.8–5.2)
SODIUM SERPL-SCNC: 138 MMOL/L (ref 136–145)
WBC # BLD AUTO: 9.3 K/UL (ref 3.6–11)

## 2022-05-27 PROCEDURE — 74011250636 HC RX REV CODE- 250/636: Performed by: HOSPITALIST

## 2022-05-27 PROCEDURE — 83880 ASSAY OF NATRIURETIC PEPTIDE: CPT

## 2022-05-27 PROCEDURE — 74011000258 HC RX REV CODE- 258: Performed by: HOSPITALIST

## 2022-05-27 PROCEDURE — 74011000250 HC RX REV CODE- 250: Performed by: HOSPITALIST

## 2022-05-27 PROCEDURE — 85027 COMPLETE CBC AUTOMATED: CPT

## 2022-05-27 PROCEDURE — 97530 THERAPEUTIC ACTIVITIES: CPT

## 2022-05-27 PROCEDURE — 74011250637 HC RX REV CODE- 250/637: Performed by: HOSPITALIST

## 2022-05-27 PROCEDURE — 94640 AIRWAY INHALATION TREATMENT: CPT

## 2022-05-27 PROCEDURE — 97162 PT EVAL MOD COMPLEX 30 MIN: CPT

## 2022-05-27 PROCEDURE — 77010033678 HC OXYGEN DAILY

## 2022-05-27 PROCEDURE — 36415 COLL VENOUS BLD VENIPUNCTURE: CPT

## 2022-05-27 PROCEDURE — 65270000046 HC RM TELEMETRY

## 2022-05-27 PROCEDURE — 94660 CPAP INITIATION&MGMT: CPT

## 2022-05-27 PROCEDURE — 80053 COMPREHEN METABOLIC PANEL: CPT

## 2022-05-27 PROCEDURE — 83735 ASSAY OF MAGNESIUM: CPT

## 2022-05-27 PROCEDURE — 84100 ASSAY OF PHOSPHORUS: CPT

## 2022-05-27 PROCEDURE — 74011000250 HC RX REV CODE- 250: Performed by: NURSE PRACTITIONER

## 2022-05-27 RX ORDER — POTASSIUM CHLORIDE 7.45 MG/ML
10 INJECTION INTRAVENOUS
Status: COMPLETED | OUTPATIENT
Start: 2022-05-27 | End: 2022-05-28

## 2022-05-27 RX ORDER — POTASSIUM CHLORIDE 750 MG/1
40 TABLET, FILM COATED, EXTENDED RELEASE ORAL EVERY 4 HOURS
Status: COMPLETED | OUTPATIENT
Start: 2022-05-27 | End: 2022-05-27

## 2022-05-27 RX ORDER — IPRATROPIUM BROMIDE AND ALBUTEROL SULFATE 2.5; .5 MG/3ML; MG/3ML
3 SOLUTION RESPIRATORY (INHALATION)
Status: DISCONTINUED | OUTPATIENT
Start: 2022-05-27 | End: 2022-05-27

## 2022-05-27 RX ORDER — IPRATROPIUM BROMIDE AND ALBUTEROL SULFATE 2.5; .5 MG/3ML; MG/3ML
3 SOLUTION RESPIRATORY (INHALATION) 3 TIMES DAILY
Status: DISCONTINUED | OUTPATIENT
Start: 2022-05-28 | End: 2022-05-31

## 2022-05-27 RX ADMIN — DIPHENHYDRAMINE HYDROCHLORIDE, ZINC ACETATE: 2; .1 CREAM TOPICAL at 08:21

## 2022-05-27 RX ADMIN — SODIUM CHLORIDE, PRESERVATIVE FREE 10 ML: 5 INJECTION INTRAVENOUS at 21:12

## 2022-05-27 RX ADMIN — GABAPENTIN 100 MG: 100 CAPSULE ORAL at 08:20

## 2022-05-27 RX ADMIN — IPRATROPIUM BROMIDE AND ALBUTEROL SULFATE 3 ML: .5; 3 SOLUTION RESPIRATORY (INHALATION) at 19:47

## 2022-05-27 RX ADMIN — IPRATROPIUM BROMIDE AND ALBUTEROL SULFATE 3 ML: .5; 3 SOLUTION RESPIRATORY (INHALATION) at 14:28

## 2022-05-27 RX ADMIN — DIPHENHYDRAMINE HYDROCHLORIDE, ZINC ACETATE: 2; .1 CREAM TOPICAL at 17:57

## 2022-05-27 RX ADMIN — SODIUM CHLORIDE, PRESERVATIVE FREE 10 ML: 5 INJECTION INTRAVENOUS at 13:34

## 2022-05-27 RX ADMIN — DIPHENHYDRAMINE HYDROCHLORIDE, ZINC ACETATE: 2; .1 CREAM TOPICAL at 21:27

## 2022-05-27 RX ADMIN — ACETAZOLAMIDE SODIUM 500 MG: 500 INJECTION, POWDER, LYOPHILIZED, FOR SOLUTION INTRAVENOUS at 17:57

## 2022-05-27 RX ADMIN — IPRATROPIUM BROMIDE AND ALBUTEROL SULFATE 3 ML: .5; 3 SOLUTION RESPIRATORY (INHALATION) at 09:24

## 2022-05-27 RX ADMIN — DIPHENHYDRAMINE HYDROCHLORIDE, ZINC ACETATE: 2; .1 CREAM TOPICAL at 13:32

## 2022-05-27 RX ADMIN — GABAPENTIN 100 MG: 100 CAPSULE ORAL at 16:22

## 2022-05-27 RX ADMIN — RIVAROXABAN 20 MG: 20 TABLET, FILM COATED ORAL at 08:20

## 2022-05-27 RX ADMIN — PIPERACILLIN AND TAZOBACTAM 3.38 G: 3; .375 INJECTION, POWDER, LYOPHILIZED, FOR SOLUTION INTRAVENOUS at 21:13

## 2022-05-27 RX ADMIN — PIPERACILLIN AND TAZOBACTAM 3.38 G: 3; .375 INJECTION, POWDER, LYOPHILIZED, FOR SOLUTION INTRAVENOUS at 13:01

## 2022-05-27 RX ADMIN — POTASSIUM CHLORIDE 10 MEQ: 7.46 INJECTION, SOLUTION INTRAVENOUS at 12:46

## 2022-05-27 RX ADMIN — PIPERACILLIN AND TAZOBACTAM 3.38 G: 3; .375 INJECTION, POWDER, LYOPHILIZED, FOR SOLUTION INTRAVENOUS at 06:04

## 2022-05-27 RX ADMIN — SODIUM CHLORIDE, PRESERVATIVE FREE 10 ML: 5 INJECTION INTRAVENOUS at 06:04

## 2022-05-27 RX ADMIN — POTASSIUM CHLORIDE 40 MEQ: 750 TABLET, FILM COATED, EXTENDED RELEASE ORAL at 09:30

## 2022-05-27 RX ADMIN — AMIODARONE HYDROCHLORIDE 200 MG: 200 TABLET ORAL at 08:20

## 2022-05-27 RX ADMIN — MICONAZOLE NITRATE: 20 CREAM TOPICAL at 09:00

## 2022-05-27 RX ADMIN — METHYLPREDNISOLONE SODIUM SUCCINATE 50 MG: 125 INJECTION, POWDER, FOR SOLUTION INTRAMUSCULAR; INTRAVENOUS at 21:13

## 2022-05-27 RX ADMIN — Medication: at 18:03

## 2022-05-27 RX ADMIN — POTASSIUM CHLORIDE 10 MEQ: 7.46 INJECTION, SOLUTION INTRAVENOUS at 13:31

## 2022-05-27 RX ADMIN — MICONAZOLE NITRATE: 20 CREAM TOPICAL at 21:26

## 2022-05-27 RX ADMIN — Medication: at 08:20

## 2022-05-27 RX ADMIN — DAPTOMYCIN 700 MG: 500 INJECTION, POWDER, LYOPHILIZED, FOR SOLUTION INTRAVENOUS at 11:28

## 2022-05-27 RX ADMIN — METHYLPREDNISOLONE SODIUM SUCCINATE 50 MG: 125 INJECTION, POWDER, FOR SOLUTION INTRAMUSCULAR; INTRAVENOUS at 06:04

## 2022-05-27 RX ADMIN — POTASSIUM CHLORIDE 40 MEQ: 750 TABLET, FILM COATED, EXTENDED RELEASE ORAL at 13:32

## 2022-05-27 RX ADMIN — GABAPENTIN 100 MG: 100 CAPSULE ORAL at 21:13

## 2022-05-27 RX ADMIN — ACETAZOLAMIDE SODIUM 500 MG: 500 INJECTION, POWDER, LYOPHILIZED, FOR SOLUTION INTRAVENOUS at 08:30

## 2022-05-27 NOTE — PROGRESS NOTES
ADULT PROTOCOL: JET AEROSOL ASSESSMENT    Patient  Sally Draper     57 y.o.   female     5/27/2022  12:15 AM    Breath Sounds Pre Procedure: Right Breath Sounds: Diminished                               Left Breath Sounds: Diminished    Breath Sounds Post Procedure: Right Breath Sounds: Diminished                                 Left Breath Sounds: Diminished    Breathing pattern: Pre procedure Breathing Pattern: Regular          Post procedure Breathing Pattern: Regular    Heart Rate: Pre procedure Pulse: 89           Post procedure Pulse: 84    Resp Rate: Pre procedure Respirations: 22           Post procedure Respirations: 22        Oxygen: O2 Device: CPAP mask   Flow rate (L/min) 3     Changed: NO    SpO2: Pre procedure SpO2: 99 %   with oxygen              Post procedure SpO2: 97 %  with oxygen    Nebulizer Therapy: Current medications Aerosolized Medications: DuoNeb      Changed: NO    Smoking History: former smoker    Problem List:   Patient Active Problem List   Diagnosis Code    COVID-19 ruled out Z20.822    A-fib ([facility] [address] I48.91    S/P cardiac cath Z98.890    Mixed hyperlipidemia E78.2    [facility] E[facility]    Acute respiratory failure (HCC) J96.00    Atrial flutter with rapid ventricular response (HCC) I48.92    JOSLYN (obstructive sleep apnea) G47.33    Acute on chronic respiratory failure with hypoxia and hypercapnia (HCC) J96.21, J96.22    SOB (shortness of breath) R06.02    Physical deconditioning R53.81    Counseling regarding advance care planning and goals of care Z71.89    Chronic pain G89.29    Dependence on supplemental oxygen Z99.81    Essential hypertension I10    Heart failure (HCC) I50.9    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

## 2022-05-27 NOTE — PROGRESS NOTES
PHYSICAL THERAPY EVALUATION/DISCHARGE  Patient: Celsa Ralph (19 y.o. female)  Date: 5/27/2022  Primary Diagnosis: Acute respiratory failure (HCC) [J96.00]       Precautions:  Skin,Fall,NWB (NWB LLE)      ASSESSMENT  Based on the objective data described below, the patient presents with non-functional bilateral LE due to weakness and contractures. Per the last Orthopedic consult she had a chronic displaced L femoral neck contracture. An MRI on 5/3/22 showed in addition to the L femoral neck fracture were R femoral head and L greater trochanter non-displaced fx as well as R sacral and bilateral parasymphyseal pubic insufficiency fractures. She has been bed bound for over 2 years prior to the 5/3/22 and is normally cared for by her family. Her bilateral UE are functional but weak - she is able to feed herself, clean parts of her upper body and groom her hair. Her trunk strength is poor. For rolling in bed for repositioning she needs max a x 2 to total assistance. She is unable to tolerate coming to sitting due to severe pain in bilateral knees and hips. She appears to the most part to be near baseline. She may benefit from SNF rehab to further strengthen her UEs and improve bed mobility to decreased risk for skin breakdown and to make cleaning the patient easier for patient and caregivers. She is unlikely to be able to use her LEs to allow transfers out of bed to chair and likely to need placement in LTC in the future. Further skilled acute physical therapy is not indicated at this time. Functional Outcome Measure: The patient scored 10/100 on the Barthel outcome measure which is indicative of being totally dependent for ADLs and mobility skills.       Other factors to consider for discharge: bilateral buttocks and sacral wounds; morbid obesity; high risk for falls and further skin breakdown          PLAN :  Recommendation for discharge: (in order for the patient to meet his/her long term goals)  Therapy up to 5 days/week in SNF setting vs LTC placement    This discharge recommendation:  Has not yet been discussed the attending provider and/or case management    IF patient discharges home will need the following DME: bedside commode, hospital bed, mechanical lift and wheelchair       SUBJECTIVE:   Patient stated  I want to get better.     OBJECTIVE DATA SUMMARY:   HISTORY:    Past Medical History:   Diagnosis Date    Acute on chronic respiratory failure with hypoxia and hypercapnia ([facility] 11/20/2020    Atrial fibrillation ([facility] 11/4/2020    CHF (congestive heart failure) (HCC)     Diabetes ([facility]     Hypertension     Ill-defined condition     high cholesterol    Ill-defined condition     tachycardia    JOSLYN (obstructive sleep apnea) 11/20/2020    Other and unspecified symptoms and signs involving general sensations and perceptions     ruptured disc    S/P cardiac cath 11/4/2020    11/3/2020 nonobstructive disease     Past Surgical History:   Procedure Laterality Date    UT COMPRE ELECTROPHYSIOL XM W/LEFT ATRIAL PACNG/REC N/A 11/23/2020    Lt Atrial Pace & Record During Ep Study performed by Anahi Spangler MD at [facility] CARDIAC CATH LAB    UT COMPRE EP EVAL ABLTJ 3D MAPG TX SVT N/A 11/23/2020    ABLATION A-FLUTTER performed by Anahi Spangler MD at [facility] CARDIAC CATH LAB   [facility] 1 ADD ON N/A 11/23/2020    Ablation Svt/Vt Add On performed by Anahi Spangler MD at [facility] CARDIAC CATH LAB    UT INTRACARDIAC ELECTROPHYSIOLOGIC 3D MAPPING N/A 11/23/2020    Ep 3d Mapping performed by Anahi Spangler MD at [facility] CARDIAC CATH LAB    UT STIM/PACING HEART POST IV DRUG INFU N/A 11/23/2020    Drug Stimulation performed by Anahi Spangler MD at [facility] CARDIAC CATH LAB       Prior level of function: bed bound over 2 years  Personal factors and/or comorbidities impacting plan of care: non-functional BLEs, dm, chf, chronic respiratory failure    Home Situation  Home Environment: Private residence  # Steps to Enter: 5  Rails to Enter: Yes  Hand Rails : Bilateral  Wheelchair Ramp: No  One/Two Story Residence: One story  Living Alone: No  Support Systems: Child(dejuan) (daughter and granddtr)  Patient Expects to be Discharged to[de-identified] Skilled nursing facility  Current DME Used/Available at Home: None  Tub or Shower Type: Other (comment) (sponge bathed w/assistance)    EXAMINATION/PRESENTATION/DECISION MAKING:   Critical Behavior:  Neurologic State: Alert  Orientation Level: Oriented X4  Cognition: Appropriate decision making,Appropriate for age attention/concentration,Appropriate safety awareness,Follows commands  Safety/Judgement: Awareness of environment,Fall prevention,Decreased insight into deficits,Good awareness of safety precautions  Hearing:   good  Skin:  Sacral and bilateral buttocks wounds  Edema: moderate in bilateral feet  Range Of Motion:  AROM: Grossly decreased, non-functional (non-functional BLE; 3+/5 BUE)           PROM: Grossly decreased, non-functional (L knee flexion contracture with L hip contracted in frog leg)           Strength:    Strength: Grossly decreased, non-functional (non-functional BLE; 3+/5 BUE)                    Tone & Sensation:   Tone: Normal              Sensation: Intact               Coordination:  Coordination: Grossly decreased, non-functional    Functional Mobility:  Bed Mobility:  Rolling: Maximum assistance;Assist x2  Supine to Sit: Other (comment) (unable to tolerate coming to sitting due to pain BLE)        Balance:   Sitting:  (unable to come to sitting on side of bed)        Therapeutic Exercises: Ankle stretches, hip abd, hip ir, knee flexion on R and knee extension on L. Functional Measure:  Barthel Index:    Bathin  Bladder: 0  Bowels: 0  Groomin  Dressin  Feedin  Mobility: 0  Stairs: 0  Toilet Use: 0  Transfer (Bed to Chair and Back): 0  Total: 10/100       The Barthel ADL Index: Guidelines  1.  The index should be used as a record of what a patient does, not as a record of what a patient could do. 2. The main aim is to establish degree of independence from any help, physical or verbal, however minor and for whatever reason. 3. The need for supervision renders the patient not independent. 4. A patient's performance should be established using the best available evidence. Asking the patient, friends/relatives and nurses are the usual sources, but direct observation and common sense are also important. However direct testing is not needed. 5. Usually the patient's performance over the preceding 24-48 hours is important, but occasionally longer periods will be relevant. 6. Middle categories imply that the patient supplies over 50 per cent of the effort. 7. Use of aids to be independent is allowed. Score Interpretation (from [facility])    Independent   60-79 Minimally independent   40-59 Partially dependent   20-39 Very dependent   <20 Totally dependent     -Iqra Gtz., Barthel, D.W. (1965). Functional evaluation: the Barthel Index. [facility] ([address] (1997). The Barthel activities of daily living index: self-reporting versus actual performance in the old (> or = 75 years). Journal of [address] [place] [address] 457), [facility], [facility], Rohith Massey., Tree Monahan. (1999). Measuring the change in disability after inpatient rehabilitation; comparison of the responsiveness of the Barthel Index and Functional [place] Measure. Journal of Neurology, Neurosurgery, and Psychiatry, 66(4), 632-419. Gabino Contreras, N.J.A, JAME Figueroa, & Evelyne Mtz M.A. (2004) Assessment of post-stroke quality of life in cost-effectiveness studies: The usefulness of the Barthel Index and the EuroQoL-5D.  Quality of Life Research, 15, 935-78        Physical Therapy Evaluation Charge Determination   History Examination Presentation Decision-Making   HIGH Complexity :3+ comorbidities / personal factors will impact the outcome/ POC  HIGH Complexity : 4+ Standardized tests and measures addressing body structure, function, activity limitation and / or participation in recreation  LOW Complexity : Stable, uncomplicated  Other outcome measures Barthel  HIGH       Based on the above components, the patient evaluation is determined to be of the following complexity level: MEDIUM    Pain Rating:  Severe pain with movement of R ankle and R knee, severe pain with movement of L knee and L hip; no pain at rest    Activity Tolerance:   Poor and limited by LLE pain with movement during rolling and attempted supine to sit. After treatment patient left in no apparent distress:   Supine in bed, Heels elevated for pressure relief, Call bell within reach, Bed / chair alarm activated and Side rails x 3    COMMUNICATION/EDUCATION:   The patients plan of care was discussed with: Registered nurse. Patient understands intent and goals of therapy, but is neutral about his/her participation.     Thank you for this referral.  Gerard Dunne, PT   Time Calculation: 20 mins

## 2022-05-27 NOTE — PROGRESS NOTES
End of Shift Note    Bedside shift change report given to Ti Rn  (oncoming nurse) by Gerard Gonzalez RN (offgoing nurse). Report included the following information SBAR, Kardex and MAR    Shift worked:  [id number]     Shift summary and any significant changes:        Concerns for physician to address: Text Hospitalist with am critical results from lab Co2 is 43   Zone phone for oncoming shift:       Activity:  Activity Level: Bed Rest  Number times ambulated in hallways past shift: 0  Number of times OOB to chair past shift: 0    Cardiac:   Cardiac Monitoring: Yes      Cardiac Rhythm: Sinus Rhythm    Access:   Current line(s): PIV and PICC     Genitourinary:   Urinary status: voiding, incontinent and external catheter    Respiratory:   O2 Device: CPAP mask  Chronic home O2 use?: YES  Incentive spirometer at bedside: NO       GI:  Last Bowel Movement Date: 05/26/22  Current diet:  ADULT DIET Easy to Chew; 4 carb choices (60 gm/meal); No Salt Added (3-4 gm)  Passing flatus: YES  Tolerating current diet: YES       Pain Management:   Patient states pain is manageable on current regimen: YES    Skin:  Gaston Score: 12  Interventions: turn team, speciality bed, float heels, foam dressing and internal/external urinary devices    Patient Safety:  Fall Score:  Total Score: 2  Interventions: bed/chair alarm       Length of Stay:  Expected LOS: 3d 14h  Actual LOS: 4      Gerard Gonzalez RN

## 2022-05-27 NOTE — PROGRESS NOTES
ADULT PROTOCOL: JET AEROSOL  REASSESSMENT    Patient  Bravo Prasad     57 y.o.   female     5/27/2022  12:38 PM    Breath Sounds Pre Procedure: Right Breath Sounds: Diminished                               Left Breath Sounds: Diminished    Breath Sounds Post Procedure: Right Breath Sounds: Diminished                                 Left Breath Sounds: Diminished    Breathing pattern: Pre procedure Breathing Pattern: Regular          Post procedure Breathing Pattern: Regular    Heart Rate: Pre procedure Pulse: 79           Post procedure Pulse: 83    Resp Rate: Pre procedure Respirations: 20           Post procedure Respirations: 29    Cough: Pre procedure Cough: Non-productive               Post procedure Cough: Non-productive      Oxygen: O2 Device: Nasal cannula   Flow rate (L/min) 4     Changed: NO    SpO2: Pre procedure SpO2: 100 %   with oxygen              Post procedure SpO2: 96 %  with oxygen    Nebulizer Therapy: Current medications Aerosolized Medications: DuoNeb      Changed: YES Q6        Problem List:   Patient Active Problem List   Diagnosis Code    COVID-19 ruled out Z20.822    [facility] I48.91    S/P cardiac cath Z98.890    Mixed hyperlipidemia E78.2    Morbid obesity ([facility] E66.01    Acute respiratory failure (HCC) J96.00    Atrial flutter with rapid ventricular response (HCC) I48.92    JOSLYN (obstructive sleep apnea) G47.33    Acute on chronic respiratory failure with hypoxia and hypercapnia (HCC) J96.21, J96.22    SOB (shortness of breath) R06.02    Physical deconditioning R53.81    Counseling regarding advance care planning and goals of care Z71.89    Chronic pain G89.29    Dependence on supplemental oxygen Z99.81    Essential hypertension I10    Heart failure (HCC) I50.9    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

## 2022-05-27 NOTE — PROGRESS NOTES
Problem: Pressure Injury - Risk of  Goal: *Prevention of pressure injury  Description: Document Gaston Scale and appropriate interventions in the flowsheet.   5/27/2022 1729 by Addison Nettles RN  Outcome: Progressing Towards Goal  Note: Pressure Injury Interventions:  Sensory Interventions: Assess changes in LOC,Assess need for specialty bed    Moisture Interventions: Absorbent underpads,Apply protective barrier, creams and emollients    Activity Interventions: Assess need for specialty bed,Chair cushion,PT/OT evaluation    Mobility Interventions: Assess need for specialty bed,Chair cushion    Nutrition Interventions: Document food/fluid/supplement intake    Friction and Shear Interventions: Apply protective barrier, creams and emollients             5/27/2022 1517 by Addison Nettles, RN  Outcome: Progressing Towards Goal  Note: Pressure Injury Interventions:  Sensory Interventions: Assess changes in LOC,Assess need for specialty bed    Moisture Interventions: Absorbent underpads,Apply protective barrier, creams and emollients    Activity Interventions: Assess need for specialty bed,Chair cushion,PT/OT evaluation    Mobility Interventions: Assess need for specialty bed,Chair cushion    Nutrition Interventions: Document food/fluid/supplement intake    Friction and Shear Interventions: Apply protective barrier, creams and emollients                Problem: Patient Education: Go to Patient Education Activity  Goal: Patient/Family Education  Outcome: Progressing Towards Goal

## 2022-05-27 NOTE — PROGRESS NOTES
Hospitalist Progress Note    NAME: Jose Almonte   :  1960   MRN:  [id number]     Subjective:   Daily Progress Note: 2022 12:06 PM      Chief complaint: Transferred out of ICU  with after treated for respiratory failure  Patient seen and examined, chart was reviewed.       Current Facility-Administered Medications   Medication Dose Route Frequency    potassium chloride SR (KLOR-CON 10) tablet 40 mEq  40 mEq Oral Q4H    ammonium lactate (LAC-HYDRIN) 12 % lotion   Topical BID    miconazole (SECURA) 2 % extra thick cream   Topical BID    diphenhydrAMINE-zinc acetate 2%-0.1% (BENADRYL) cream   Topical QID    methylPREDNISolone (PF) (Solu-MEDROL) injection 50 mg  50 mg IntraVENous Q8H    lidocaine 4 % patch 2 Patch  2 Patch TransDERmal Q24H    diphenhydrAMINE (BENADRYL) capsule 25 mg  25 mg Oral Q6H PRN    sodium chloride (NS) flush 5-40 mL  5-40 mL IntraVENous Q8H    sodium chloride (NS) flush 5-40 mL  5-40 mL IntraVENous PRN    acetaminophen (TYLENOL) tablet 650 mg  650 mg Oral Q6H PRN    Or    acetaminophen (TYLENOL) suppository 650 mg  650 mg Rectal Q6H PRN    polyethylene glycol (MIRALAX) packet 17 g  17 g Oral DAILY PRN    ondansetron (ZOFRAN ODT) tablet 4 mg  4 mg Oral Q8H PRN    Or    ondansetron (ZOFRAN) injection 4 mg  4 mg IntraVENous Q6H PRN    albuterol-ipratropium (DUO-NEB) 2.5 MG-0.5 MG/3 ML  3 mL Nebulization Q4H PRN    amiodarone (CORDARONE) tablet 200 mg  200 mg Oral DAILY    gabapentin (NEURONTIN) capsule 100 mg  100 mg Oral TID    rivaroxaban (XARELTO) tablet 20 mg  20 mg Oral DAILY WITH BREAKFAST    albuterol-ipratropium (DUO-NEB) 2.5 MG-0.5 MG/3 ML  3 mL Nebulization QID RT    DAPTOmycin (CUBICIN) 700 mg in 0.9% sodium chloride 50 mL IVPB  8 mg/kg (Adjusted) IntraVENous Q24H    piperacillin-tazobactam (ZOSYN) 3.375 g in 0.9% sodium chloride (MBP/ADV) 100 mL MBP  3.375 g IntraVENous Q8H          Objective:     Visit Vitals  BP (!) 134/57 (BP 1 Location: Right lower arm)   Pulse 85   Temp 98.3 °F (36.8 °C)   Resp 19   Wt 135 kg (297 lb 9.9 oz)   SpO2 96%   BMI 52.72 kg/m²    O2 Flow Rate (L/min): 4 l/min O2 Device: Nasal cannula    Temp (24hrs), Av.2 °F (36.8 °C), Min:98 °F (36.7 °C), Max:98.5 °F (36.9 °C)        PHYSICAL EXAM:  General obese  Neck Short  CVS RRR  Respiratory symmetric expansion  Abdomen morbidly obese, soft  Extremities nonpitting edema  Neuro AAOx3  Psych anxious  Skin erythematous rash on chest and upper extremities improving      Data Review    Recent Results (from the past 24 hour(s))   MAGNESIUM    Collection Time: 22  6:12 AM   Result Value Ref Range    Magnesium 2.6 (H) 1.6 - 2.4 mg/dL   PHOSPHORUS    Collection Time: 22  6:12 AM   Result Value Ref Range    Phosphorus 2.7 2.6 - 4.7 MG/DL   CBC W/O DIFF    Collection Time: 22  6:12 AM   Result Value Ref Range    WBC 9.3 3.6 - 11.0 K/uL    RBC 3.05 (L) 3.80 - 5.20 M/uL    HGB 9.3 (L) 11.5 - 16.0 g/dL    HCT 32.3 (L) 35.0 - 47.0 %    .9 (H) 80.0 - 99.0 FL    MCH 30.5 26.0 - 34.0 PG    MCHC 28.8 (L) 30.0 - 36.5 g/dL    RDW 15.2 (H) 11.5 - 14.5 %    PLATELET 426 (L) 878 - 400 K/uL    MPV 11.7 8.9 - 12.9 FL    NRBC 0.4 (H) 0  WBC    ABSOLUTE NRBC 0.04 (H) 0.00 - 0.24 K/uL   METABOLIC PANEL, COMPREHENSIVE    Collection Time: 22  6:12 AM   Result Value Ref Range    Sodium 138 136 - 145 mmol/L    Potassium 2.9 (L) 3.5 - 5.1 mmol/L    Chloride 92 (L) 97 - 108 mmol/L    CO2 43 (HH) 21 - 32 mmol/L    Anion gap 3 (L) 5 - 15 mmol/L    Glucose 202 (H) 65 - 100 mg/dL    BUN 20 6 - 20 MG/DL    Creatinine 0.66 0.55 - 1.02 MG/DL    BUN/Creatinine ratio 30 (H) 12 - 20      GFR est AA >60 >60 ml/min/1.73m2    GFR est non-AA >60 >60 ml/min/1.73m2    Calcium 8.6 8.5 - 10.1 MG/DL    Bilirubin, total 0.4 0.2 - 1.0 MG/DL    ALT (SGPT) 40 12 - 78 U/L    AST (SGOT) 66 (H) 15 - 37 U/L    Alk.  phosphatase 472 (H) 45 - 117 U/L    Protein, total 6.5 6.4 - 8.2 g/dL Albumin 2.2 (L) 3.5 - 5.0 g/dL    Globulin 4.3 (H) 2.0 - 4.0 g/dL    A-G Ratio 0.5 (L) 1.1 - 2.2     NT-PRO BNP    Collection Time: 05/27/22  6:12 AM   Result Value Ref Range    NT pro-BNP 1,087 (H) <125 PG/ML     No results found for this visit on 05/23/22. All Micro Results     Procedure Component Value Units Date/Time    CULTURE, BLOOD, PAIRED [[id number]] Collected: 05/23/22 1447    Order Status: Completed Specimen: Blood Updated: 05/27/22 0810     Special Requests: NO SPECIAL REQUESTS        Culture result: NO GROWTH 4 DAYS       COVID-19 RAPID TEST [[id number]] Collected: 05/23/22 1447    Order Status: Completed Specimen: Nasopharyngeal Updated: 05/23/22 1549     Specimen source Nasopharyngeal        COVID-19 rapid test Not detected        Comment: Rapid Abbott ID Now       Rapid NAAT:  The specimen is NEGATIVE for SARS-CoV-2, the novel coronavirus associated with COVID-19. Negative results should be treated as presumptive and, if inconsistent with clinical signs and symptoms or necessary for patient management, should be tested with an alternative molecular assay. Negative results do not preclude SARS-CoV-2 infection and should not be used as the sole basis for patient management decisions. This test has been authorized by the FDA under an Emergency Use Authorization (EUA) for use by authorized laboratories. Fact sheet for Healthcare Providers: ConventionUpdate.co.nz  Fact sheet for Patients: ConventionUpdate.co.nz       Methodology: Isothermal Nucleic Acid Amplification                Radiology reports and films for the last 24 hours have been reviewed.     Assessment/Plan:   Acute on chronic hypoxic/hypercapnic respiratory failure-resolved  Left-sided pleural effusion- not enough fluid for ultrasound-guided thoracentesis  JOSLYN/OHS on trilogy  COPD on O2  Clinically symptoms improved  Continue oxygen taper  Continue IV steroid taper slowly  Continue trilogy as per pulmonary  Follow clinical course  Seen by pulmonary continue current treatment  On 4 liters of oxygen which is baseline    Left hip fracture displaced  age undetermined  ? left hip osteomyelitis/septic arthritis   Bilateral buttock and sacral wounds    -[facility] evaluated no surgical intervention planned  -S/p  Aspiration?/Bx by IR, culture negative. -ID recommends daptomycin and cefepime on discharge last admit.  End date 6/16.  No statin while on daptomycin. ( ID follow-up was planned on 6/30 as OP. Weekly CBC, CMP, CK & ESR/CRP every other week. Resume Lipitor after completing ABx.)  *Cefepime was changed to IV Zosyn secondary to skin rash suspected from IV cefepime        Metabolic alkalosis- holding Lasix, Diamox 500 IV twice daily x 2 doses  We will continue diamox 500m g I.V x 2 more days  Chr DHF/PAF: -Continue  amiodarone and xarelto. Lasix held as Met alkalosis  Chronic microcytic anemia-monitor CBC periodically, was on folic acid  Steroid induced hyperglycemia-A1c level 5.4 on 4/21, ISS  HTN controlled.  Cont' PTA meds. Morbid obesity/Impaired mobility/functional quadriplegia-supportive care  Drug rash- suspected from cefepime now changed to Zosyn. Cont steroids      Hypokalemia:  We will replete and monitor     Non ambulatory state: Consult PT/OT    ADFC  DVT PRx on Xarelto  NOK   Dispo: Back to SNF on Monday 05/30       Signed By: Clark Mccormick MD     May 27, 2022

## 2022-05-27 NOTE — PROGRESS NOTES
End of Shift Note    Bedside shift change report given to [address] [place] [address] & [address] (oncoming nurse) by Ranjith Sweeney RN (offgoing nurse). Report included the following information SBAR    Shift worked:  7am-7pm     Shift summary and any significant changes:     Pt working with pt      Concerns for physician to address:  none     Zone phone for oncoming shift:       Activity:  Activity Level: Bed Rest  Number times ambulated in hallways past shift: 0  Number of times OOB to chair past shift: 0    Cardiac:   Cardiac Monitoring: Yes      Cardiac Rhythm: Sinus Rhythm    Access:   Current line(s): PIV     Genitourinary:   Urinary status: external catheter    Respiratory:   O2 Device: Nasal cannula  Chronic home O2 use?: YES  Incentive spirometer at bedside: YES       GI:  Last Bowel Movement Date: 05/26/22  Current diet:  ADULT DIET Easy to Chew; 4 carb choices (60 gm/meal); No Salt Added (3-4 gm)  Passing flatus: YES  Tolerating current diet: YES       Pain Management:   Patient states pain is manageable on current regimen: YES    Skin:  Gaston Score: 14  Interventions: turn team, speciality bed, float heels and PT/OT consult    Patient Safety:  Fall Score:  Total Score: 2  Interventions: bed/chair alarm and gripper socks       Length of Stay:  Expected LOS: 3d 14h  Actual LOS: 4      Ti D MARIE Nettles

## 2022-05-27 NOTE — PROGRESS NOTES
Transition of Care Plan:     RUR: 20% - high risk  Disposition: Return to [place]  Follow up appointments: PCP, Specialist  DME needed: depends on disposition. Transportation at Discharge: CM to arrange BLS  [place] or means to access home:   Dtr has access. IM Medicare Letter: 2nd  Medicare letter to receive prior to discharge. Is patient a BCPI-A Bundle:  No                    If yes, was Bundle Letter given?:  N/A  Is patient a [place] and connected with the [place]? No             If yes, was [employer] transfer form completed and [facility] notified? Caregiver Contact: Augustina De La Torre, daughter, [phone]  Discharge Caregiver contacted prior to discharge? CM to contact prior to discharge. Care Conference needed?:  No    Updated Note 2;25 pm: CM spoke to [facility] regarding pt needing a trilogy at SNF. Was informed of the followin. They have CPAPs and BiPAPs already at [facility]. 2. If pt needs a trilogy, they will need to have an order with settings with explanation of why pt needs trilogy vs CPAP / BiPAP along with supporting documentation. (They have a company that they work with). 3. All of this needs to be taken care of prior to pt coming to facility. Initial Note 2:20 pm: In review of chart, pt is not medically stable for discharge. Unit CM will continue to follow.      Ortega Lucio RN, BSN, [address] [place] [address] Care Manager  [phone]

## 2022-05-28 LAB
ANION GAP SERPL CALC-SCNC: 3 MMOL/L (ref 5–15)
BACTERIA SPEC CULT: NORMAL
BUN SERPL-MCNC: 20 MG/DL (ref 6–20)
BUN/CREAT SERPL: 36 (ref 12–20)
CALCIUM SERPL-MCNC: 8.8 MG/DL (ref 8.5–10.1)
CHLORIDE SERPL-SCNC: 99 MMOL/L (ref 97–108)
CO2 SERPL-SCNC: 38 MMOL/L (ref 21–32)
CREAT SERPL-MCNC: 0.55 MG/DL (ref 0.55–1.02)
GLUCOSE SERPL-MCNC: 235 MG/DL (ref 65–100)
MAGNESIUM SERPL-MCNC: 2.8 MG/DL (ref 1.6–2.4)
PHOSPHATE SERPL-MCNC: 2.4 MG/DL (ref 2.6–4.7)
POTASSIUM SERPL-SCNC: 3.9 MMOL/L (ref 3.5–5.1)
SERVICE CMNT-IMP: NORMAL
SODIUM SERPL-SCNC: 140 MMOL/L (ref 136–145)

## 2022-05-28 PROCEDURE — 94640 AIRWAY INHALATION TREATMENT: CPT

## 2022-05-28 PROCEDURE — 74011000258 HC RX REV CODE- 258: Performed by: HOSPITALIST

## 2022-05-28 PROCEDURE — 74011000250 HC RX REV CODE- 250: Performed by: HOSPITALIST

## 2022-05-28 PROCEDURE — 74011250637 HC RX REV CODE- 250/637: Performed by: INTERNAL MEDICINE

## 2022-05-28 PROCEDURE — 84100 ASSAY OF PHOSPHORUS: CPT

## 2022-05-28 PROCEDURE — 94660 CPAP INITIATION&MGMT: CPT

## 2022-05-28 PROCEDURE — 65270000046 HC RM TELEMETRY

## 2022-05-28 PROCEDURE — 74011250636 HC RX REV CODE- 250/636: Performed by: HOSPITALIST

## 2022-05-28 PROCEDURE — 77010033678 HC OXYGEN DAILY

## 2022-05-28 PROCEDURE — 83735 ASSAY OF MAGNESIUM: CPT

## 2022-05-28 PROCEDURE — 80048 BASIC METABOLIC PNL TOTAL CA: CPT

## 2022-05-28 PROCEDURE — 74011250636 HC RX REV CODE- 250/636: Performed by: INTERNAL MEDICINE

## 2022-05-28 PROCEDURE — 74011250637 HC RX REV CODE- 250/637: Performed by: HOSPITALIST

## 2022-05-28 PROCEDURE — 36415 COLL VENOUS BLD VENIPUNCTURE: CPT

## 2022-05-28 PROCEDURE — 74011000258 HC RX REV CODE- 258: Performed by: INTERNAL MEDICINE

## 2022-05-28 RX ORDER — SODIUM,POTASSIUM PHOSPHATES 280-250MG
1 POWDER IN PACKET (EA) ORAL ONCE
Status: COMPLETED | OUTPATIENT
Start: 2022-05-28 | End: 2022-05-28

## 2022-05-28 RX ADMIN — SODIUM CHLORIDE, PRESERVATIVE FREE 10 ML: 5 INJECTION INTRAVENOUS at 13:59

## 2022-05-28 RX ADMIN — GABAPENTIN 100 MG: 100 CAPSULE ORAL at 08:16

## 2022-05-28 RX ADMIN — ACETAMINOPHEN 650 MG: 325 TABLET ORAL at 13:53

## 2022-05-28 RX ADMIN — GABAPENTIN 100 MG: 100 CAPSULE ORAL at 16:16

## 2022-05-28 RX ADMIN — METHYLPREDNISOLONE SODIUM SUCCINATE 50 MG: 125 INJECTION, POWDER, FOR SOLUTION INTRAMUSCULAR; INTRAVENOUS at 08:16

## 2022-05-28 RX ADMIN — MICONAZOLE NITRATE: 20 CREAM TOPICAL at 21:49

## 2022-05-28 RX ADMIN — DIPHENHYDRAMINE HYDROCHLORIDE, ZINC ACETATE: 2; .1 CREAM TOPICAL at 08:17

## 2022-05-28 RX ADMIN — AMIODARONE HYDROCHLORIDE 200 MG: 200 TABLET ORAL at 08:16

## 2022-05-28 RX ADMIN — POTASSIUM & SODIUM PHOSPHATES POWDER PACK 280-160-250 MG 1 PACKET: 280-160-250 PACK at 11:02

## 2022-05-28 RX ADMIN — PIPERACILLIN AND TAZOBACTAM 3.38 G: 3; .375 INJECTION, POWDER, LYOPHILIZED, FOR SOLUTION INTRAVENOUS at 21:51

## 2022-05-28 RX ADMIN — METHYLPREDNISOLONE SODIUM SUCCINATE 50 MG: 125 INJECTION, POWDER, FOR SOLUTION INTRAMUSCULAR; INTRAVENOUS at 21:50

## 2022-05-28 RX ADMIN — DAPTOMYCIN 700 MG: 500 INJECTION, POWDER, LYOPHILIZED, FOR SOLUTION INTRAVENOUS at 10:41

## 2022-05-28 RX ADMIN — PIPERACILLIN AND TAZOBACTAM 3.38 G: 3; .375 INJECTION, POWDER, LYOPHILIZED, FOR SOLUTION INTRAVENOUS at 05:00

## 2022-05-28 RX ADMIN — GABAPENTIN 100 MG: 100 CAPSULE ORAL at 21:50

## 2022-05-28 RX ADMIN — MICONAZOLE NITRATE: 20 CREAM TOPICAL at 08:16

## 2022-05-28 RX ADMIN — DIPHENHYDRAMINE HYDROCHLORIDE, ZINC ACETATE: 2; .1 CREAM TOPICAL at 17:55

## 2022-05-28 RX ADMIN — ACETAZOLAMIDE SODIUM 500 MG: 500 INJECTION, POWDER, LYOPHILIZED, FOR SOLUTION INTRAVENOUS at 08:17

## 2022-05-28 RX ADMIN — Medication: at 17:55

## 2022-05-28 RX ADMIN — PIPERACILLIN AND TAZOBACTAM 3.38 G: 3; .375 INJECTION, POWDER, LYOPHILIZED, FOR SOLUTION INTRAVENOUS at 13:52

## 2022-05-28 RX ADMIN — DIPHENHYDRAMINE HYDROCHLORIDE, ZINC ACETATE: 2; .1 CREAM TOPICAL at 13:52

## 2022-05-28 RX ADMIN — DIPHENHYDRAMINE HYDROCHLORIDE, ZINC ACETATE: 2; .1 CREAM TOPICAL at 21:48

## 2022-05-28 RX ADMIN — SODIUM CHLORIDE, PRESERVATIVE FREE 10 ML: 5 INJECTION INTRAVENOUS at 21:56

## 2022-05-28 RX ADMIN — IPRATROPIUM BROMIDE AND ALBUTEROL SULFATE 3 ML: .5; 3 SOLUTION RESPIRATORY (INHALATION) at 13:49

## 2022-05-28 RX ADMIN — IPRATROPIUM BROMIDE AND ALBUTEROL SULFATE 3 ML: .5; 3 SOLUTION RESPIRATORY (INHALATION) at 08:00

## 2022-05-28 RX ADMIN — SODIUM CHLORIDE, PRESERVATIVE FREE 10 ML: 5 INJECTION INTRAVENOUS at 05:00

## 2022-05-28 RX ADMIN — Medication: at 08:16

## 2022-05-28 RX ADMIN — RIVAROXABAN 20 MG: 20 TABLET, FILM COATED ORAL at 08:16

## 2022-05-28 RX ADMIN — IPRATROPIUM BROMIDE AND ALBUTEROL SULFATE 3 ML: .5; 3 SOLUTION RESPIRATORY (INHALATION) at 20:42

## 2022-05-28 NOTE — PROGRESS NOTES
Bedside and Verbal shift change report given to Rupa Katz (oncoming nurse) by Diogo Nicole (offgoing nurse). Report included the following information SBAR, Kardex, Procedure Summary, Intake/Output, MAR and Recent Results. 1135: Wound care performed per orders, patient tolerated ok    1355: Patient with complaints of pain in bilateral knees and back, lidoderm patches applied and Tylenol given.  Patient also repositioned in the bed

## 2022-05-28 NOTE — PROGRESS NOTES
ADULT PROTOCOL: JET AEROSOL ASSESSMENT    Patient  Rigo Villafuerte     57 y.o.   female     5/28/2022  1:08 AM    Breath Sounds Pre Procedure: Right Breath Sounds: Diminished                               Left Breath Sounds: Diminished    Breath Sounds Post Procedure: Right Breath Sounds: Diminished                                 Left Breath Sounds: Diminished    Breathing pattern: Pre procedure Breathing Pattern: Regular          Post procedure Breathing Pattern: Regular    Heart Rate: Pre procedure Pulse: 89           Post procedure Pulse: 83    Resp Rate: Pre procedure Respirations: 22           Post procedure Respirations: 29      Oxygen: O2 Device: CPAP mask   Flow rate (L/min) 4     Changed: NO    SpO2: Pre procedure SpO2: 98 %   with oxygen              Post procedure SpO2: 96 %  with oxygen    Nebulizer Therapy: Current medications Aerosolized Medications: DuoNeb      Changed: YES    Smoking History: former smoker     Problem List:   Patient Active Problem List   Diagnosis Code    COVID-19 ruled out Z20.822    A-fib ([facility] [address] I48.91    S/P cardiac cath Z98.890    Mixed hyperlipidemia E78.2    [facility] E66.01    Acute respiratory failure (HCC) J96.00    Atrial flutter with rapid ventricular response (HCC) I48.92    JOSLYN (obstructive sleep apnea) G47.33    Acute on chronic respiratory failure with hypoxia and hypercapnia (HCC) J96.21, J96.22    SOB (shortness of breath) R06.02    Physical deconditioning R53.81    Counseling regarding advance care planning and goals of care Z71.89    Chronic pain G89.29    Dependence on supplemental oxygen Z99.81    Essential hypertension I10    Heart failure (HCC) I50.9    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

## 2022-05-28 NOTE — PROGRESS NOTES
Spiritual Care Assessment/Progress Note  [facility]      NAME: Jose Almonte      MRN: [id number]  AGE: 57 y.o.  SEX: female  Hindu Affiliation: [place]   Language: English     5/28/2022     Total Time (in minutes): 26     Spiritual Assessment begun in [facility] PROGRESSIVE CARE through conversation with:         [x]Patient        [] Family    [] Friend(s)        Reason for Consult: Initial/Spiritual assessment, patient floor     Spiritual beliefs: (Please include comment if needed)     [x] Identifies with a rosalina tradition:  Islam         [] Supported by a rosalina community:            [] Claims no spiritual orientation:           [] Seeking spiritual identity:                [] Adheres to an individual form of spirituality:           [] Not able to assess:                           Identified resources for coping:      [x] Prayer                               [] Music                  [] Guided Imagery     [x] Family/friends                 [] Pet visits     [] Devotional reading                         [] Unknown     [] Other:                                             Interventions offered during this visit: (See comments for more details)    Patient Interventions: Affirmation of emotions/emotional suffering,Initial/Spiritual assessment, patient floor,Coping skills reviewed/reinforced,Life review/legacy,Normalization of emotional/spiritual concerns,Prayer (actual),Integration of medical assessment with existing values and beliefs,Affirmation of rosalina,Catharsis/review of pertinent events in supportive environment,Iconic (affirming the presence of God/Higher Power)           Plan of Care:     [] Support spiritual and/or cultural needs    [] Support AMD and/or advance care planning process      [] Support grieving process   [] Coordinate Rites and/or Rituals    [] Coordination with community clergy   [] No spiritual needs identified at this time   [] Detailed Plan of Care below (See Comments)  [] Make referral to Music Therapy  [] Make referral to Pet Therapy     [] Make referral to Addiction services  [] Make referral to [facility]  [] Make referral to Spiritual Care Partner  [] No future visits requested        [x] Contact Spiritual Care for further referrals     Comments:  Patient, Miss Gracia Mcgowan, was in bed, a nurse attending to her when  knocked and entered. She indicated she was ordering dinner on the phone but was happy to talk to . She shared about her medical issues, expressing concern that she keeps returning to the hospital after a short at home. She finds herself back in the hospital bed, most times, not being aware of when she was picked up by the EMS. She indicated that it was distressing struggling with the same ailment over and over. Her rosalina helps her cope, and her family also is very supportive. She has a daughter and a grand daughter who calls about three times a day to check on her.  offered supportive listening presence and empathy. She requested for prayer when asked how she would like to be supported. Requested prayer was offered, patient thanked  for the visit and prayer. Visited by: Kylah Harding.    Paging Service: [phone])

## 2022-05-28 NOTE — PROGRESS NOTES
Occupational Therapy note:    Orders acknowledged and chart reviewed. Patient is bed bound at baseline and requires total care for all ADLs. Patient is not appropriate for OT services. Will sign off.     Robyn Elizabeth OTR/L

## 2022-05-28 NOTE — PROGRESS NOTES
Bedside shift change report given to Negin Strauss (oncoming nurse) by Addison (offgoing nurse). Report included the following information SBAR, Kardex, ED Summary, Intake/Output, MAR, Recent Results and Cardiac Rhythm NSR.

## 2022-05-29 LAB
ANION GAP SERPL CALC-SCNC: 2 MMOL/L (ref 5–15)
BUN SERPL-MCNC: 23 MG/DL (ref 6–20)
BUN/CREAT SERPL: 39 (ref 12–20)
CALCIUM SERPL-MCNC: 8.7 MG/DL (ref 8.5–10.1)
CHLORIDE SERPL-SCNC: 100 MMOL/L (ref 97–108)
CO2 SERPL-SCNC: 36 MMOL/L (ref 21–32)
CREAT SERPL-MCNC: 0.59 MG/DL (ref 0.55–1.02)
ERYTHROCYTE [DISTWIDTH] IN BLOOD BY AUTOMATED COUNT: 14.8 % (ref 11.5–14.5)
GLUCOSE SERPL-MCNC: 248 MG/DL (ref 65–100)
HCT VFR BLD AUTO: 30.3 % (ref 35–47)
HGB BLD-MCNC: 8.8 G/DL (ref 11.5–16)
MAGNESIUM SERPL-MCNC: 2.8 MG/DL (ref 1.6–2.4)
MCH RBC QN AUTO: 30.2 PG (ref 26–34)
MCHC RBC AUTO-ENTMCNC: 29 G/DL (ref 30–36.5)
MCV RBC AUTO: 104.1 FL (ref 80–99)
NRBC # BLD: 0.03 K/UL (ref 0–0.01)
NRBC BLD-RTO: 0.3 PER 100 WBC
PHOSPHATE SERPL-MCNC: 2.7 MG/DL (ref 2.6–4.7)
PLATELET # BLD AUTO: 114 K/UL (ref 150–400)
PMV BLD AUTO: 12.1 FL (ref 8.9–12.9)
POTASSIUM SERPL-SCNC: 3.8 MMOL/L (ref 3.5–5.1)
RBC # BLD AUTO: 2.91 M/UL (ref 3.8–5.2)
SODIUM SERPL-SCNC: 138 MMOL/L (ref 136–145)
WBC # BLD AUTO: 10.4 K/UL (ref 3.6–11)

## 2022-05-29 PROCEDURE — 74011000250 HC RX REV CODE- 250: Performed by: HOSPITALIST

## 2022-05-29 PROCEDURE — 74011250636 HC RX REV CODE- 250/636: Performed by: HOSPITALIST

## 2022-05-29 PROCEDURE — 83735 ASSAY OF MAGNESIUM: CPT

## 2022-05-29 PROCEDURE — 74011000258 HC RX REV CODE- 258: Performed by: INTERNAL MEDICINE

## 2022-05-29 PROCEDURE — 94640 AIRWAY INHALATION TREATMENT: CPT

## 2022-05-29 PROCEDURE — 65270000046 HC RM TELEMETRY

## 2022-05-29 PROCEDURE — 77010033678 HC OXYGEN DAILY

## 2022-05-29 PROCEDURE — 74011250637 HC RX REV CODE- 250/637: Performed by: HOSPITALIST

## 2022-05-29 PROCEDURE — 74011250636 HC RX REV CODE- 250/636: Performed by: INTERNAL MEDICINE

## 2022-05-29 PROCEDURE — 94660 CPAP INITIATION&MGMT: CPT

## 2022-05-29 PROCEDURE — 80048 BASIC METABOLIC PNL TOTAL CA: CPT

## 2022-05-29 PROCEDURE — 74011250637 HC RX REV CODE- 250/637: Performed by: NURSE PRACTITIONER

## 2022-05-29 PROCEDURE — 84100 ASSAY OF PHOSPHORUS: CPT

## 2022-05-29 PROCEDURE — 74011000258 HC RX REV CODE- 258: Performed by: HOSPITALIST

## 2022-05-29 PROCEDURE — 85027 COMPLETE CBC AUTOMATED: CPT

## 2022-05-29 PROCEDURE — 36415 COLL VENOUS BLD VENIPUNCTURE: CPT

## 2022-05-29 RX ORDER — PREDNISONE 20 MG/1
40 TABLET ORAL
Status: DISCONTINUED | OUTPATIENT
Start: 2022-05-30 | End: 2022-06-02

## 2022-05-29 RX ORDER — FUROSEMIDE 40 MG/1
40 TABLET ORAL DAILY
Status: DISCONTINUED | OUTPATIENT
Start: 2022-05-30 | End: 2022-06-05

## 2022-05-29 RX ORDER — TRAMADOL HYDROCHLORIDE 50 MG/1
50 TABLET ORAL ONCE
Status: COMPLETED | OUTPATIENT
Start: 2022-05-29 | End: 2022-05-29

## 2022-05-29 RX ORDER — FUROSEMIDE 10 MG/ML
40 INJECTION INTRAMUSCULAR; INTRAVENOUS DAILY
Status: DISCONTINUED | OUTPATIENT
Start: 2022-05-30 | End: 2022-05-29

## 2022-05-29 RX ADMIN — AMIODARONE HYDROCHLORIDE 200 MG: 200 TABLET ORAL at 12:33

## 2022-05-29 RX ADMIN — PIPERACILLIN AND TAZOBACTAM 3.38 G: 3; .375 INJECTION, POWDER, LYOPHILIZED, FOR SOLUTION INTRAVENOUS at 15:49

## 2022-05-29 RX ADMIN — METHYLPREDNISOLONE SODIUM SUCCINATE 50 MG: 125 INJECTION, POWDER, FOR SOLUTION INTRAMUSCULAR; INTRAVENOUS at 09:00

## 2022-05-29 RX ADMIN — DIPHENHYDRAMINE HYDROCHLORIDE, ZINC ACETATE: 2; .1 CREAM TOPICAL at 12:34

## 2022-05-29 RX ADMIN — MICONAZOLE NITRATE: 20 CREAM TOPICAL at 12:34

## 2022-05-29 RX ADMIN — PIPERACILLIN AND TAZOBACTAM 3.38 G: 3; .375 INJECTION, POWDER, LYOPHILIZED, FOR SOLUTION INTRAVENOUS at 06:43

## 2022-05-29 RX ADMIN — Medication: at 18:55

## 2022-05-29 RX ADMIN — RIVAROXABAN 20 MG: 20 TABLET, FILM COATED ORAL at 12:33

## 2022-05-29 RX ADMIN — SODIUM CHLORIDE, PRESERVATIVE FREE 10 ML: 5 INJECTION INTRAVENOUS at 12:42

## 2022-05-29 RX ADMIN — DIPHENHYDRAMINE HYDROCHLORIDE, ZINC ACETATE: 2; .1 CREAM TOPICAL at 18:55

## 2022-05-29 RX ADMIN — PIPERACILLIN AND TAZOBACTAM 3.38 G: 3; .375 INJECTION, POWDER, LYOPHILIZED, FOR SOLUTION INTRAVENOUS at 21:50

## 2022-05-29 RX ADMIN — GABAPENTIN 100 MG: 100 CAPSULE ORAL at 12:33

## 2022-05-29 RX ADMIN — MICONAZOLE NITRATE: 20 CREAM TOPICAL at 21:51

## 2022-05-29 RX ADMIN — DIPHENHYDRAMINE HYDROCHLORIDE, ZINC ACETATE: 2; .1 CREAM TOPICAL at 21:51

## 2022-05-29 RX ADMIN — DAPTOMYCIN 700 MG: 500 INJECTION, POWDER, LYOPHILIZED, FOR SOLUTION INTRAVENOUS at 12:49

## 2022-05-29 RX ADMIN — GABAPENTIN 100 MG: 100 CAPSULE ORAL at 21:50

## 2022-05-29 RX ADMIN — DIPHENHYDRAMINE HYDROCHLORIDE, ZINC ACETATE: 2; .1 CREAM TOPICAL at 12:36

## 2022-05-29 RX ADMIN — IPRATROPIUM BROMIDE AND ALBUTEROL SULFATE 3 ML: .5; 3 SOLUTION RESPIRATORY (INHALATION) at 07:00

## 2022-05-29 RX ADMIN — GABAPENTIN 100 MG: 100 CAPSULE ORAL at 15:48

## 2022-05-29 RX ADMIN — IPRATROPIUM BROMIDE AND ALBUTEROL SULFATE 3 ML: .5; 3 SOLUTION RESPIRATORY (INHALATION) at 13:47

## 2022-05-29 RX ADMIN — SODIUM CHLORIDE, PRESERVATIVE FREE 10 ML: 5 INJECTION INTRAVENOUS at 21:51

## 2022-05-29 RX ADMIN — TRAMADOL HYDROCHLORIDE 50 MG: 50 TABLET ORAL at 19:47

## 2022-05-29 RX ADMIN — Medication: at 12:35

## 2022-05-29 RX ADMIN — SODIUM CHLORIDE, PRESERVATIVE FREE 10 ML: 5 INJECTION INTRAVENOUS at 06:46

## 2022-05-29 RX ADMIN — IPRATROPIUM BROMIDE AND ALBUTEROL SULFATE 3 ML: .5; 3 SOLUTION RESPIRATORY (INHALATION) at 20:08

## 2022-05-29 NOTE — PROGRESS NOTES
ADULT PROTOCOL: JET AEROSOL ASSESSMENT    Patient  Rigo Villafuerte     57 y.o.   female     5/29/2022  9:33 AM    Breath Sounds Pre Procedure: Right Breath Sounds: Diminished                               Left Breath Sounds: Diminished    Breath Sounds Post Procedure: Right Breath Sounds: Diminished                                 Left Breath Sounds: Diminished    Breathing pattern: Pre procedure Breathing Pattern: Regular          Post procedure Breathing Pattern: Tachypneic    Heart Rate: Pre procedure Pulse: 77           Post procedure Pulse: 81    Resp Rate: Pre procedure Respirations: 18           Post procedure Respirations: 31           Cough: Pre procedure Cough: Non-productive               Post procedure Cough: Non-productive                Oxygen: O2 Device: Nasal cannula   4L     Changed: no    SpO2: Pre procedure SpO2: 98 %  with oxygen              Post procedure SpO2: 98 % with oxygen    Nebulizer Therapy: Current medications Aerosolized Medications: DuoNeb      Changedno         Problem List:   Patient Active Problem List   Diagnosis Code    COVID-19 ruled out Z20.822    A-fib ([email or web address] I48.91    S/P cardiac cath Z98.890    Mixed hyperlipidemia E78.2    Morbid obesity ([email or web address] E66.01    Acute respiratory failure (HCC) J96.00    Atrial flutter with rapid ventricular response (HCC) I48.92    JOSLYN (obstructive sleep apnea) G47.33    Acute on chronic respiratory failure with hypoxia and hypercapnia (HCC) J96.21, J96.22    SOB (shortness of breath) R06.02    Physical deconditioning R53.81    Counseling regarding advance care planning and goals of care Z71.89    Chronic pain G89.29    Dependence on supplemental oxygen Z99.81    Essential hypertension I10    Heart failure (HCC) I50.9    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

## 2022-05-29 NOTE — PROGRESS NOTES
Received notification from bedside RN about patient with regards to: leg discomfort described as burning, requesting medication for relief other than Tylenol  VS: /54, HR 93, RR 27, O2 sat 94% on NC 4 L    Intervention given: Tramadol 50 mg PO x 1 dose ordered

## 2022-05-29 NOTE — PROGRESS NOTES
End of Shift Note    Bedside shift change report given to Harriet Cooper RN (oncoming nurse) by Raj Sotomayor RN (offgoing nurse). Report included the following information SBAR, Kardex, OR Summary, Recent Results and Cardiac Rhythm SR    Shift worked:  7p-7a     Shift summary and any significant changes:     No events overnight     Concerns for physician to address: Follow up with discharge back to rehab      Zone phone for oncoming shift:          Activity:  Activity Level: Bed Rest  Number times ambulated in hallways past shift: 0  Number of times OOB to chair past shift: 0    Cardiac:   Cardiac Monitoring: Yes      Cardiac Rhythm: Sinus Rhythm    Access:   Current line(s): PIV and PICC     Genitourinary:   Urinary status: voiding and external catheter    Respiratory:   O2 Device: Nasal cannula  Chronic home O2 use?: YES  Incentive spirometer at bedside: NO       GI:  Last Bowel Movement Date: 05/28/22  Current diet:  ADULT DIET Easy to Chew; 4 carb choices (60 gm/meal); No Salt Added (3-4 gm)  Passing flatus: YES  Tolerating current diet: YES       Pain Management:   Patient states pain is manageable on current regimen: YES    Skin:  Gaston Score: 14  Interventions: turn team, speciality bed and internal/external urinary devices    Patient Safety:  Fall Score:  Total Score: 2  Interventions: gripper socks       Length of Stay:  Expected LOS: 3d 14h  [facility]

## 2022-05-29 NOTE — PROGRESS NOTES
Hospitalist Progress Note    NAME: Jm Pickett   :  1960   MRN:  [id number]     Subjective:   Daily Progress Note: 2022 12:06 PM    Sob is improving. Mild cough. No phelgm. Chief complaint: Transferred out of ICU  with after treated for respiratory failure  Patient seen and examined, chart was reviewed.       Current Facility-Administered Medications   Medication Dose Route Frequency    methylPREDNISolone (PF) (Solu-MEDROL) injection 50 mg  50 mg IntraVENous Q12H    albuterol-ipratropium (DUO-NEB) 2.5 MG-0.5 MG/3 ML  3 mL Nebulization TID    ammonium lactate (LAC-HYDRIN) 12 % lotion   Topical BID    miconazole (SECURA) 2 % extra thick cream   Topical BID    diphenhydrAMINE-zinc acetate 2%-0.1% (BENADRYL) cream   Topical QID    lidocaine 4 % patch 2 Patch  2 Patch TransDERmal Q24H    diphenhydrAMINE (BENADRYL) capsule 25 mg  25 mg Oral Q6H PRN    sodium chloride (NS) flush 5-40 mL  5-40 mL IntraVENous Q8H    sodium chloride (NS) flush 5-40 mL  5-40 mL IntraVENous PRN    acetaminophen (TYLENOL) tablet 650 mg  650 mg Oral Q6H PRN    Or    acetaminophen (TYLENOL) suppository 650 mg  650 mg Rectal Q6H PRN    polyethylene glycol (MIRALAX) packet 17 g  17 g Oral DAILY PRN    ondansetron (ZOFRAN ODT) tablet 4 mg  4 mg Oral Q8H PRN    Or    ondansetron (ZOFRAN) injection 4 mg  4 mg IntraVENous Q6H PRN    albuterol-ipratropium (DUO-NEB) 2.5 MG-0.5 MG/3 ML  3 mL Nebulization Q4H PRN    amiodarone (CORDARONE) tablet 200 mg  200 mg Oral DAILY    gabapentin (NEURONTIN) capsule 100 mg  100 mg Oral TID    rivaroxaban (XARELTO) tablet 20 mg  20 mg Oral DAILY WITH BREAKFAST    DAPTOmycin (CUBICIN) 700 mg in 0.9% sodium chloride 50 mL IVPB  8 mg/kg (Adjusted) IntraVENous Q24H    piperacillin-tazobactam (ZOSYN) 3.375 g in 0.9% sodium chloride (MBP/ADV) 100 mL MBP  3.375 g IntraVENous Q8H          Objective:     Visit Vitals  BP (!) 157/62   Pulse 92   Temp 98.6 °F (37 °C)   Resp 24   Wt 135 kg (297 lb 9.9 oz)   SpO2 96%   BMI 52.72 kg/m²    O2 Flow Rate (L/min): 4 l/min O2 Device: Nasal cannula    Temp (24hrs), Av.4 °F (36.9 °C), Min:98.2 °F (36.8 °C), Max:98.6 °F (37 °C)        PHYSICAL EXAM:  General obese  Neck Short  CVS RRR  Respiratory symmetric expansion  Abdomen morbidly obese, soft  Extremities nonpitting edema  Neuro AAOx3  Psych anxious  Skin erythematous rash on chest and upper extremities improving      Data Review    Recent Results (from the past 24 hour(s))   MAGNESIUM    Collection Time: 22  2:53 AM   Result Value Ref Range    Magnesium 2.8 (H) 1.6 - 2.4 mg/dL   PHOSPHORUS    Collection Time: 22  2:53 AM   Result Value Ref Range    Phosphorus 2.4 (L) 2.6 - 4.7 MG/DL   METABOLIC PANEL, BASIC    Collection Time: 22  2:53 AM   Result Value Ref Range    Sodium 140 136 - 145 mmol/L    Potassium 3.9 3.5 - 5.1 mmol/L    Chloride 99 97 - 108 mmol/L    CO2 38 (H) 21 - 32 mmol/L    Anion gap 3 (L) 5 - 15 mmol/L    Glucose 235 (H) 65 - 100 mg/dL    BUN 20 6 - 20 MG/DL    Creatinine 0.55 0.55 - 1.02 MG/DL    BUN/Creatinine ratio 36 (H) 12 - 20      GFR est AA >60 >60 ml/min/1.73m2    GFR est non-AA >60 >60 ml/min/1.73m2    Calcium 8.8 8.5 - 10.1 MG/DL     No results found for this visit on 22. All Micro Results     Procedure Component Value Units Date/Time    CULTURE, BLOOD, PAIRED [[id number]] Collected: 22    Order Status: Completed Specimen: Blood Updated: 22 0848     Special Requests: NO SPECIAL REQUESTS        Culture result: NO GROWTH 5 DAYS       COVID-19 RAPID TEST [[id number]] Collected: 227    Order Status: Completed Specimen: Nasopharyngeal Updated: 22 1545     Specimen source Nasopharyngeal        COVID-19 rapid test Not detected        Comment: Rapid Abbott ID Now       Rapid NAAT:  The specimen is NEGATIVE for SARS-CoV-2, the novel coronavirus associated with COVID-19.        Negative results should be treated as presumptive and, if inconsistent with clinical signs and symptoms or necessary for patient management, should be tested with an alternative molecular assay. Negative results do not preclude SARS-CoV-2 infection and should not be used as the sole basis for patient management decisions. This test has been authorized by the FDA under an Emergency Use Authorization (EUA) for use by authorized laboratories. Fact sheet for Healthcare Providers: ConventionUpdate.co.nz  Fact sheet for Patients: ConventionUpdate.co.nz       Methodology: Isothermal Nucleic Acid Amplification                Radiology reports and films for the last 24 hours have been reviewed. Assessment/Plan:   Acute on chronic hypoxic/hypercapnic respiratory failure-resolved  Left-sided pleural effusion- not enough fluid for ultrasound-guided thoracentesis  JSOLYN/OHS on trilogy  COPD on O2  Clinically symptoms improved  Continue oxygen taper  Continue IV steroid taper slowly  Continue trilogy as per pulmonary  Follow clinical course  Seen by pulmonary continue current treatment  On 4 liters of oxygen which is baseline    Left hip fracture displaced  age undetermined  ? left hip osteomyelitis/septic arthritis   Bilateral buttock and sacral wounds    -[facility] evaluated no surgical intervention planned  -S/p  Aspiration?/Bx by IR, culture negative. -ID recommends daptomycin and cefepime on discharge last admit.  End date 6/16.  No statin while on daptomycin. ( ID follow-up was planned on 6/30 as OP. Weekly CBC, CMP, CK & ESR/CRP every other week. Resume Lipitor after completing ABx.)  *Cefepime was changed to IV Zosyn secondary to skin rash suspected from IV cefepime        Metabolic alkalosis- holding Lasix, Diamox 500 IV twice daily x 2 doses  We will continue diamox 500m g I.V x 2 more days. Bicarb is improving. Check bmp in am tomorrow. Chr DHF/PAF: -Continue  amiodarone and xarelto.  Lasix held as Met alkalosis  Chronic microcytic anemia-monitor CBC periodically, was on folic acid  Steroid induced hyperglycemia-A1c level 5.4 on 4/21, ISS  HTN controlled.  Cont' PTA meds. Morbid obesity/Impaired mobility/functional quadriplegia-supportive care  Drug rash- suspected from cefepime now changed to Zosyn. Cont steroids      Hypokalemia:  We will replete and monitor     Non ambulatory state: Consult PT/OT    ADFC  DVT PRx on Xarelto  NOK   Dispo: Back to SNF on Monday 05/30       Signed By: Cassidy Kim MD     May 28, 2022

## 2022-05-29 NOTE — PROGRESS NOTES
3 to asset and complete wound care done, cleaned for stool   skin care and pure wick changed and turned to right

## 2022-05-30 LAB
ALBUMIN SERPL-MCNC: 2.3 G/DL (ref 3.5–5)
ALBUMIN/GLOB SERPL: 0.7 {RATIO} (ref 1.1–2.2)
ALP SERPL-CCNC: 350 U/L (ref 45–117)
ALT SERPL-CCNC: 67 U/L (ref 12–78)
ANION GAP SERPL CALC-SCNC: 4 MMOL/L (ref 5–15)
AST SERPL-CCNC: 77 U/L (ref 15–37)
BILIRUB SERPL-MCNC: 0.6 MG/DL (ref 0.2–1)
BUN SERPL-MCNC: 27 MG/DL (ref 6–20)
BUN/CREAT SERPL: 47 (ref 12–20)
CALCIUM SERPL-MCNC: 8.7 MG/DL (ref 8.5–10.1)
CHLORIDE SERPL-SCNC: 101 MMOL/L (ref 97–108)
CO2 SERPL-SCNC: 35 MMOL/L (ref 21–32)
CREAT SERPL-MCNC: 0.57 MG/DL (ref 0.55–1.02)
ERYTHROCYTE [DISTWIDTH] IN BLOOD BY AUTOMATED COUNT: 14.8 % (ref 11.5–14.5)
GLOBULIN SER CALC-MCNC: 3.5 G/DL (ref 2–4)
GLUCOSE BLD STRIP.AUTO-MCNC: 182 MG/DL (ref 65–117)
GLUCOSE BLD STRIP.AUTO-MCNC: 205 MG/DL (ref 65–117)
GLUCOSE BLD STRIP.AUTO-MCNC: 251 MG/DL (ref 65–117)
GLUCOSE BLD STRIP.AUTO-MCNC: 266 MG/DL (ref 65–117)
GLUCOSE SERPL-MCNC: 247 MG/DL (ref 65–100)
HCT VFR BLD AUTO: 30.4 % (ref 35–47)
HGB BLD-MCNC: 8.9 G/DL (ref 11.5–16)
MAGNESIUM SERPL-MCNC: 2.8 MG/DL (ref 1.6–2.4)
MCH RBC QN AUTO: 30.3 PG (ref 26–34)
MCHC RBC AUTO-ENTMCNC: 29.3 G/DL (ref 30–36.5)
MCV RBC AUTO: 103.4 FL (ref 80–99)
NRBC # BLD: 0.03 K/UL (ref 0–0.01)
NRBC BLD-RTO: 0.2 PER 100 WBC
PHOSPHATE SERPL-MCNC: 2.5 MG/DL (ref 2.6–4.7)
PLATELET # BLD AUTO: 125 K/UL (ref 150–400)
PMV BLD AUTO: 12.7 FL (ref 8.9–12.9)
POTASSIUM SERPL-SCNC: 4 MMOL/L (ref 3.5–5.1)
PROT SERPL-MCNC: 5.8 G/DL (ref 6.4–8.2)
RBC # BLD AUTO: 2.94 M/UL (ref 3.8–5.2)
SERVICE CMNT-IMP: ABNORMAL
SODIUM SERPL-SCNC: 140 MMOL/L (ref 136–145)
WBC # BLD AUTO: 13.4 K/UL (ref 3.6–11)

## 2022-05-30 PROCEDURE — 74011250637 HC RX REV CODE- 250/637: Performed by: HOSPITALIST

## 2022-05-30 PROCEDURE — 82962 GLUCOSE BLOOD TEST: CPT

## 2022-05-30 PROCEDURE — 84100 ASSAY OF PHOSPHORUS: CPT

## 2022-05-30 PROCEDURE — 74011000250 HC RX REV CODE- 250: Performed by: HOSPITALIST

## 2022-05-30 PROCEDURE — 85027 COMPLETE CBC AUTOMATED: CPT

## 2022-05-30 PROCEDURE — 77010033678 HC OXYGEN DAILY

## 2022-05-30 PROCEDURE — 74011250637 HC RX REV CODE- 250/637: Performed by: INTERNAL MEDICINE

## 2022-05-30 PROCEDURE — 94640 AIRWAY INHALATION TREATMENT: CPT

## 2022-05-30 PROCEDURE — 65270000046 HC RM TELEMETRY

## 2022-05-30 PROCEDURE — 74011636637 HC RX REV CODE- 636/637: Performed by: INTERNAL MEDICINE

## 2022-05-30 PROCEDURE — 74011250636 HC RX REV CODE- 250/636: Performed by: INTERNAL MEDICINE

## 2022-05-30 PROCEDURE — 74011636637 HC RX REV CODE- 636/637: Performed by: HOSPITALIST

## 2022-05-30 PROCEDURE — 74011250637 HC RX REV CODE- 250/637

## 2022-05-30 PROCEDURE — 74011250636 HC RX REV CODE- 250/636: Performed by: HOSPITALIST

## 2022-05-30 PROCEDURE — 80053 COMPREHEN METABOLIC PANEL: CPT

## 2022-05-30 PROCEDURE — 74011000258 HC RX REV CODE- 258: Performed by: INTERNAL MEDICINE

## 2022-05-30 PROCEDURE — 94761 N-INVAS EAR/PLS OXIMETRY MLT: CPT

## 2022-05-30 PROCEDURE — 74011000258 HC RX REV CODE- 258: Performed by: HOSPITALIST

## 2022-05-30 PROCEDURE — 36415 COLL VENOUS BLD VENIPUNCTURE: CPT

## 2022-05-30 PROCEDURE — 83735 ASSAY OF MAGNESIUM: CPT

## 2022-05-30 RX ORDER — DEXTROSE MONOHYDRATE 100 MG/ML
0-250 INJECTION, SOLUTION INTRAVENOUS AS NEEDED
Status: DISCONTINUED | OUTPATIENT
Start: 2022-05-30 | End: 2022-06-17 | Stop reason: HOSPADM

## 2022-05-30 RX ORDER — TRAMADOL HYDROCHLORIDE 50 MG/1
50 TABLET ORAL
Status: COMPLETED | OUTPATIENT
Start: 2022-05-30 | End: 2022-05-31

## 2022-05-30 RX ORDER — INSULIN LISPRO 100 [IU]/ML
INJECTION, SOLUTION INTRAVENOUS; SUBCUTANEOUS
Status: DISCONTINUED | OUTPATIENT
Start: 2022-05-30 | End: 2022-06-17 | Stop reason: HOSPADM

## 2022-05-30 RX ORDER — MAGNESIUM SULFATE 100 %
4 CRYSTALS MISCELLANEOUS AS NEEDED
Status: DISCONTINUED | OUTPATIENT
Start: 2022-05-30 | End: 2022-06-17 | Stop reason: HOSPADM

## 2022-05-30 RX ADMIN — ACETAMINOPHEN 650 MG: 325 TABLET ORAL at 20:03

## 2022-05-30 RX ADMIN — Medication: at 08:37

## 2022-05-30 RX ADMIN — RIVAROXABAN 20 MG: 20 TABLET, FILM COATED ORAL at 08:33

## 2022-05-30 RX ADMIN — Medication: at 17:26

## 2022-05-30 RX ADMIN — Medication 3 UNITS: at 10:33

## 2022-05-30 RX ADMIN — MICONAZOLE NITRATE: 20 CREAM TOPICAL at 21:03

## 2022-05-30 RX ADMIN — IPRATROPIUM BROMIDE AND ALBUTEROL SULFATE 3 ML: .5; 3 SOLUTION RESPIRATORY (INHALATION) at 20:02

## 2022-05-30 RX ADMIN — SODIUM CHLORIDE, PRESERVATIVE FREE 10 ML: 5 INJECTION INTRAVENOUS at 17:25

## 2022-05-30 RX ADMIN — DAPTOMYCIN 700 MG: 500 INJECTION, POWDER, LYOPHILIZED, FOR SOLUTION INTRAVENOUS at 10:33

## 2022-05-30 RX ADMIN — GABAPENTIN 100 MG: 100 CAPSULE ORAL at 08:34

## 2022-05-30 RX ADMIN — AMIODARONE HYDROCHLORIDE 200 MG: 200 TABLET ORAL at 08:34

## 2022-05-30 RX ADMIN — SODIUM CHLORIDE, PRESERVATIVE FREE 10 ML: 5 INJECTION INTRAVENOUS at 21:04

## 2022-05-30 RX ADMIN — IPRATROPIUM BROMIDE AND ALBUTEROL SULFATE 3 ML: .5; 3 SOLUTION RESPIRATORY (INHALATION) at 07:51

## 2022-05-30 RX ADMIN — GABAPENTIN 100 MG: 100 CAPSULE ORAL at 15:39

## 2022-05-30 RX ADMIN — TRAMADOL HYDROCHLORIDE 50 MG: 50 TABLET ORAL at 21:03

## 2022-05-30 RX ADMIN — Medication 2 UNITS: at 12:52

## 2022-05-30 RX ADMIN — Medication 3 UNITS: at 21:03

## 2022-05-30 RX ADMIN — Medication 5 UNITS: at 17:25

## 2022-05-30 RX ADMIN — SODIUM CHLORIDE, PRESERVATIVE FREE 10 ML: 5 INJECTION INTRAVENOUS at 06:10

## 2022-05-30 RX ADMIN — MICONAZOLE NITRATE: 20 CREAM TOPICAL at 08:36

## 2022-05-30 RX ADMIN — PREDNISONE 40 MG: 20 TABLET ORAL at 08:34

## 2022-05-30 RX ADMIN — FUROSEMIDE 40 MG: 40 TABLET ORAL at 08:34

## 2022-05-30 RX ADMIN — PIPERACILLIN AND TAZOBACTAM 3.38 G: 3; .375 INJECTION, POWDER, LYOPHILIZED, FOR SOLUTION INTRAVENOUS at 21:03

## 2022-05-30 RX ADMIN — IPRATROPIUM BROMIDE AND ALBUTEROL SULFATE 3 ML: .5; 3 SOLUTION RESPIRATORY (INHALATION) at 13:16

## 2022-05-30 RX ADMIN — GABAPENTIN 100 MG: 100 CAPSULE ORAL at 21:03

## 2022-05-30 RX ADMIN — PIPERACILLIN AND TAZOBACTAM 3.38 G: 3; .375 INJECTION, POWDER, LYOPHILIZED, FOR SOLUTION INTRAVENOUS at 15:39

## 2022-05-30 RX ADMIN — DIPHENHYDRAMINE HYDROCHLORIDE, ZINC ACETATE: 2; .1 CREAM TOPICAL at 08:36

## 2022-05-30 RX ADMIN — PIPERACILLIN AND TAZOBACTAM 3.38 G: 3; .375 INJECTION, POWDER, LYOPHILIZED, FOR SOLUTION INTRAVENOUS at 06:10

## 2022-05-30 NOTE — PROGRESS NOTES
ADULT PROTOCOL: JET AEROSOL ASSESSMENT    Patient  Dustin Nugent     57 y.o.   female     5/30/2022  8:32 AM    Breath Sounds Pre Procedure: Right Breath Sounds: Diminished                               Left Breath Sounds: Diminished    Breath Sounds Post Procedure: Right Breath Sounds: Diminished                                 Left Breath Sounds: Diminished    Breathing pattern: Pre procedure Breathing Pattern: Regular          Post procedure Breathing Pattern: Regular    Heart Rate: Pre procedure Pulse: 82           Post procedure Pulse: 84    Resp Rate: Pre procedure Respirations: 24           Post procedure Respirations: 22    Oxygen: O2 Device: Nasal cannula  4lpm         SpO2: Pre procedure SpO2: 93 %              Post procedure SpO2: 94 %      Nebulizer Therapy: Current medications Aerosolized Medications: DuoNeb          Problem List:   Patient Active Problem List   Diagnosis Code    COVID-19 ruled out Z20.822    A-fib ([address] I48.91    S/P cardiac cath Z98.890    Mixed hyperlipidemia E78.2    Morbid obesity (HCC) E66.01    Acute respiratory failure (HCC) J96.00    Atrial flutter with rapid ventricular response (HCC) I48.92    JOSLYN (obstructive sleep apnea) G47.33    Acute on chronic respiratory failure with hypoxia and hypercapnia (HCC) J96.21, J96.22    SOB (shortness of breath) R06.02    Physical deconditioning R53.81    Counseling regarding advance care planning and goals of care Z71.89    Chronic pain G89.29    Dependence on supplemental oxygen Z99.81    Essential hypertension I10    Heart failure (HCC) I50.9    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

## 2022-05-30 NOTE — PROGRESS NOTES
Hospitalist Progress Note    NAME: Bravo Prasad   :  1960   MRN:  [id number]     Subjective:   Daily Progress Note: 2022 12:06 PM    She reports feeling better. Shortness of breath is better today. Mild cough. No phlegm. No diarrhea.           Current Facility-Administered Medications   Medication Dose Route Frequency    [START ON 2022] predniSONE (DELTASONE) tablet 40 mg  40 mg Oral DAILY WITH BREAKFAST    [START ON 2022] furosemide (LASIX) tablet 40 mg  40 mg Oral DAILY    albuterol-ipratropium (DUO-NEB) 2.5 MG-0.5 MG/3 ML  3 mL Nebulization TID    ammonium lactate (LAC-HYDRIN) 12 % lotion   Topical BID    miconazole (SECURA) 2 % extra thick cream   Topical BID    diphenhydrAMINE-zinc acetate 2%-0.1% (BENADRYL) cream   Topical QID    lidocaine 4 % patch 2 Patch  2 Patch TransDERmal Q24H    diphenhydrAMINE (BENADRYL) capsule 25 mg  25 mg Oral Q6H PRN    sodium chloride (NS) flush 5-40 mL  5-40 mL IntraVENous Q8H    sodium chloride (NS) flush 5-40 mL  5-40 mL IntraVENous PRN    acetaminophen (TYLENOL) tablet 650 mg  650 mg Oral Q6H PRN    Or    acetaminophen (TYLENOL) suppository 650 mg  650 mg Rectal Q6H PRN    polyethylene glycol (MIRALAX) packet 17 g  17 g Oral DAILY PRN    ondansetron (ZOFRAN ODT) tablet 4 mg  4 mg Oral Q8H PRN    Or    ondansetron (ZOFRAN) injection 4 mg  4 mg IntraVENous Q6H PRN    albuterol-ipratropium (DUO-NEB) 2.5 MG-0.5 MG/3 ML  3 mL Nebulization Q4H PRN    amiodarone (CORDARONE) tablet 200 mg  200 mg Oral DAILY    gabapentin (NEURONTIN) capsule 100 mg  100 mg Oral TID    rivaroxaban (XARELTO) tablet 20 mg  20 mg Oral DAILY WITH BREAKFAST    DAPTOmycin (CUBICIN) 700 mg in 0.9% sodium chloride 50 mL IVPB  8 mg/kg (Adjusted) IntraVENous Q24H    piperacillin-tazobactam (ZOSYN) 3.375 g in 0.9% sodium chloride (MBP/ADV) 100 mL MBP  3.375 g IntraVENous Q8H          Objective:     Visit Vitals  BP (!) 149/58 (BP 1 Location: Right lower arm, BP Patient Position: At rest)   Pulse 94   Temp 98.4 °F (36.9 °C)   Resp 25   Wt 140.6 kg (310 lb)   SpO2 97%   BMI 54.91 kg/m²    O2 Flow Rate (L/min): 6 l/min O2 Device: BIPAP    Temp (24hrs), Av.3 °F (36.8 °C), Min:97.8 °F (36.6 °C), Max:98.8 °F (37.1 °C)        PHYSICAL EXAM:  General obese  Neck Short  CVS RRR  Respiratory symmetric expansion, crackles at bases, no wheezing  Abdomen morbidly obese, soft, nontender  Extremities nonpitting edema  Neuro AAOx3  Psych anxious  Skin erythematous rash on chest and upper extremities improving      Data Review    Recent Results (from the past 24 hour(s))   CBC W/O DIFF    Collection Time: 22  3:38 AM   Result Value Ref Range    WBC 10.4 3.6 - 11.0 K/uL    RBC 2.91 (L) 3.80 - 5.20 M/uL    HGB 8.8 (L) 11.5 - 16.0 g/dL    HCT 30.3 (L) 35.0 - 47.0 %    .1 (H) 80.0 - 99.0 FL    MCH 30.2 26.0 - 34.0 PG    MCHC 29.0 (L) 30.0 - 36.5 g/dL    RDW 14.8 (H) 11.5 - 14.5 %    PLATELET 719 (L) 924 - 400 K/uL    MPV 12.1 8.9 - 12.9 FL    NRBC 0.3 (H) 0  WBC    ABSOLUTE NRBC 0.03 (H) 0.00 - 9.99 K/uL   METABOLIC PANEL, BASIC    Collection Time: 22  3:38 AM   Result Value Ref Range    Sodium 138 136 - 145 mmol/L    Potassium 3.8 3.5 - 5.1 mmol/L    Chloride 100 97 - 108 mmol/L    CO2 36 (H) 21 - 32 mmol/L    Anion gap 2 (L) 5 - 15 mmol/L    Glucose 248 (H) 65 - 100 mg/dL    BUN 23 (H) 6 - 20 MG/DL    Creatinine 0.59 0.55 - 1.02 MG/DL    BUN/Creatinine ratio 39 (H) 12 - 20      GFR est AA >60 >60 ml/min/1.73m2    GFR est non-AA >60 >60 ml/min/1.73m2    Calcium 8.7 8.5 - 10.1 MG/DL   MAGNESIUM    Collection Time: 22  3:38 AM   Result Value Ref Range    Magnesium 2.8 (H) 1.6 - 2.4 mg/dL   PHOSPHORUS    Collection Time: 22  3:38 AM   Result Value Ref Range    Phosphorus 2.7 2.6 - 4.7 MG/DL     No results found for this visit on 22.   All Micro Results     Procedure Component Value Units Date/Time    CULTURE, BLOOD, PAIRED [[id number]] Collected: 05/23/22 1447    Order Status: Completed Specimen: Blood Updated: 05/28/22 0848     Special Requests: NO SPECIAL REQUESTS        Culture result: NO GROWTH 5 DAYS       COVID-19 RAPID TEST [[id number]] Collected: 05/23/22 1447    Order Status: Completed Specimen: Nasopharyngeal Updated: 05/23/22 1549     Specimen source Nasopharyngeal        COVID-19 rapid test Not detected        Comment: Rapid Abbott ID Now       Rapid NAAT:  The specimen is NEGATIVE for SARS-CoV-2, the novel coronavirus associated with COVID-19. Negative results should be treated as presumptive and, if inconsistent with clinical signs and symptoms or necessary for patient management, should be tested with an alternative molecular assay. Negative results do not preclude SARS-CoV-2 infection and should not be used as the sole basis for patient management decisions. This test has been authorized by the FDA under an Emergency Use Authorization (EUA) for use by authorized laboratories. Fact sheet for Healthcare Providers: ConventionVantosdate.co.nz  Fact sheet for Patients: ConventionVantosdate.co.nz       Methodology: Isothermal Nucleic Acid Amplification                Radiology reports and films for the last 24 hours have been reviewed. Assessment/Plan:   5/29  Plan for today  -Continue daptomycin plus Zosyn per ID recommendations. Continue antibiotics until 6/16.  -Change Solu-Medrol to prednisone. Continue DuoNeb. -Bicarb is improved. Resume Lasix from tomorrow. Check BMP in a.m. tomorrow  -Case management consulted for SNF placement.   Possible discharge to SNF on Tuesday once okay with all consultants      Acute on chronic hypoxic/hypercapnic respiratory failure-resolved  Left-sided pleural effusion- not enough fluid for ultrasound-guided thoracentesis  JOSLYN/OHS on trilogy  COPD on O2  Clinically symptoms improved  Continue oxygen taper  Continue IV steroid taper slowly  Continue trilogy as per pulmonary  Follow clinical course  Seen by pulmonary continue current treatment  On 4 liters of oxygen which is baseline    Left hip fracture displaced  age undetermined  ? left hip osteomyelitis/septic arthritis   Bilateral buttock and sacral wounds    -[facility] evaluated no surgical intervention planned  -S/p  Aspiration?/Bx by IR, culture negative. -ID recommends daptomycin and cefepime on discharge last admit.  End date 6/16.  No statin while on daptomycin. ( ID follow-up was planned on 6/30 as OP. Weekly CBC, CMP, CK & ESR/CRP every other week. Resume Lipitor after completing ABx.)  *Cefepime was changed to IV Zosyn secondary to skin rash suspected from IV cefepime        Metabolic alkalosis- holding Lasix, Diamox 500 IV twice daily x 2 doses  We will continue diamox 500m g I.V x 2 more days. Bicarb is improving. Check bmp in am tomorrow. Chr DHF/PAF: -Continue  amiodarone and xarelto. Lasix held as Met alkalosis  Chronic microcytic anemia-monitor CBC periodically, was on folic acid  Steroid induced hyperglycemia-A1c level 5.4 on 4/21, ISS  HTN controlled.  Cont' PTA meds. Morbid obesity/Impaired mobility/functional quadriplegia-supportive care  Drug rash- suspected from cefepime now changed to Zosyn. Cont steroids      Hypokalemia:  We will replete and monitor     Non ambulatory state: Consult PT/OT    ADFC  DVT PRx on Xarelto  NOK   Dispo: Back to SNF on Monday 05/30       Signed By: Garrett Lugo MD     May 29, 2022

## 2022-05-30 NOTE — PROGRESS NOTES
Hospitalist Progress Note    NAME: Sally Draper   :  1960   MRN:  [id number]     Subjective:   Daily Progress Note: 2022 12:06 PM    She reports feeling better.    No new complaints  On baseline oxygen    Current Facility-Administered Medications   Medication Dose Route Frequency    glucose chewable tablet 16 g  4 Tablet Oral PRN    dextrose 10% infusion 0-250 mL  0-250 mL IntraVENous PRN    glucagon (GLUCAGEN) injection 1 mg  1 mg IntraMUSCular PRN    insulin lispro (HUMALOG) injection   SubCUTAneous AC&HS    predniSONE (DELTASONE) tablet 40 mg  40 mg Oral DAILY WITH BREAKFAST    furosemide (LASIX) tablet 40 mg  40 mg Oral DAILY    albuterol-ipratropium (DUO-NEB) 2.5 MG-0.5 MG/3 ML  3 mL Nebulization TID    ammonium lactate (LAC-HYDRIN) 12 % lotion   Topical BID    miconazole (SECURA) 2 % extra thick cream   Topical BID    diphenhydrAMINE-zinc acetate 2%-0.1% (BENADRYL) cream   Topical QID    lidocaine 4 % patch 2 Patch  2 Patch TransDERmal Q24H    diphenhydrAMINE (BENADRYL) capsule 25 mg  25 mg Oral Q6H PRN    sodium chloride (NS) flush 5-40 mL  5-40 mL IntraVENous Q8H    sodium chloride (NS) flush 5-40 mL  5-40 mL IntraVENous PRN    acetaminophen (TYLENOL) tablet 650 mg  650 mg Oral Q6H PRN    Or    acetaminophen (TYLENOL) suppository 650 mg  650 mg Rectal Q6H PRN    polyethylene glycol (MIRALAX) packet 17 g  17 g Oral DAILY PRN    ondansetron (ZOFRAN ODT) tablet 4 mg  4 mg Oral Q8H PRN    Or    ondansetron (ZOFRAN) injection 4 mg  4 mg IntraVENous Q6H PRN    albuterol-ipratropium (DUO-NEB) 2.5 MG-0.5 MG/3 ML  3 mL Nebulization Q4H PRN    amiodarone (CORDARONE) tablet 200 mg  200 mg Oral DAILY    gabapentin (NEURONTIN) capsule 100 mg  100 mg Oral TID    rivaroxaban (XARELTO) tablet 20 mg  20 mg Oral DAILY WITH BREAKFAST    DAPTOmycin (CUBICIN) 700 mg in 0.9% sodium chloride 50 mL IVPB  8 mg/kg (Adjusted) IntraVENous Q24H    piperacillin-tazobactam (ZOSYN) 3.375 g in 0.9% sodium chloride (MBP/ADV) 100 mL MBP  3.375 g IntraVENous Q8H          Objective:     Visit Vitals  BP (!) 154/76   Pulse 86   Temp 98.2 °F (36.8 °C)   Resp 30   Wt 140.6 kg (310 lb)   SpO2 93%   BMI 54.91 kg/m²    O2 Flow Rate (L/min): 4 l/min O2 Device: Nasal cannula    Temp (24hrs), Av.3 °F (36.8 °C), Min:98 °F (36.7 °C), Max:98.8 °F (37.1 °C)        PHYSICAL EXAM:  General obese  Neck Short  CVS RRR  Respiratory symmetric expansion, crackles at bases, no wheezing  Abdomen morbidly obese, soft, nontender  Extremities nonpitting edema  Neuro AAOx3  Psych anxious  Skin erythematous rash on chest and upper extremities improving      Data Review    Recent Results (from the past 24 hour(s))   CBC W/O DIFF    Collection Time: 22  4:31 AM   Result Value Ref Range    WBC 13.4 (H) 3.6 - 11.0 K/uL    RBC 2.94 (L) 3.80 - 5.20 M/uL    HGB 8.9 (L) 11.5 - 16.0 g/dL    HCT 30.4 (L) 35.0 - 47.0 %    .4 (H) 80.0 - 99.0 FL    MCH 30.3 26.0 - 34.0 PG    MCHC 29.3 (L) 30.0 - 36.5 g/dL    RDW 14.8 (H) 11.5 - 14.5 %    PLATELET 313 (L) 037 - 400 K/uL    MPV 12.7 8.9 - 12.9 FL    NRBC 0.2 (H) 0  WBC    ABSOLUTE NRBC 0.03 (H) 0.00 - 9.28 K/uL   METABOLIC PANEL, COMPREHENSIVE    Collection Time: 22  4:31 AM   Result Value Ref Range    Sodium 140 136 - 145 mmol/L    Potassium 4.0 3.5 - 5.1 mmol/L    Chloride 101 97 - 108 mmol/L    CO2 35 (H) 21 - 32 mmol/L    Anion gap 4 (L) 5 - 15 mmol/L    Glucose 247 (H) 65 - 100 mg/dL    BUN 27 (H) 6 - 20 MG/DL    Creatinine 0.57 0.55 - 1.02 MG/DL    BUN/Creatinine ratio 47 (H) 12 - 20      GFR est AA >60 >60 ml/min/1.73m2    GFR est non-AA >60 >60 ml/min/1.73m2    Calcium 8.7 8.5 - 10.1 MG/DL    Bilirubin, total 0.6 0.2 - 1.0 MG/DL    ALT (SGPT) 67 12 - 78 U/L    AST (SGOT) 77 (H) 15 - 37 U/L    Alk.  phosphatase 350 (H) 45 - 117 U/L    Protein, total 5.8 (L) 6.4 - 8.2 g/dL    Albumin 2.3 (L) 3.5 - 5.0 g/dL    Globulin 3.5 2.0 - 4.0 g/dL    A-G Ratio 0.7 (L) 1.1 - 2.2     PHOSPHORUS    Collection Time: 05/30/22  4:31 AM   Result Value Ref Range    Phosphorus 2.5 (L) 2.6 - 4.7 MG/DL   MAGNESIUM    Collection Time: 05/30/22  4:31 AM   Result Value Ref Range    Magnesium 2.8 (H) 1.6 - 2.4 mg/dL   GLUCOSE, POC    Collection Time: 05/30/22  7:26 AM   Result Value Ref Range    Glucose (POC) 205 (H) 65 - 117 mg/dL    Performed by Shauna LAU      No results found for this visit on 05/23/22. All Micro Results     Procedure Component Value Units Date/Time    CULTURE, BLOOD, PAIRED [[id number]] Collected: 05/23/22 1447    Order Status: Completed Specimen: Blood Updated: 05/28/22 0848     Special Requests: NO SPECIAL REQUESTS        Culture result: NO GROWTH 5 DAYS       COVID-19 RAPID TEST [[id number]] Collected: 05/23/22 1447    Order Status: Completed Specimen: Nasopharyngeal Updated: 05/23/22 1549     Specimen source Nasopharyngeal        COVID-19 rapid test Not detected        Comment: Rapid Abbott ID Now       Rapid NAAT:  The specimen is NEGATIVE for SARS-CoV-2, the novel coronavirus associated with COVID-19. Negative results should be treated as presumptive and, if inconsistent with clinical signs and symptoms or necessary for patient management, should be tested with an alternative molecular assay. Negative results do not preclude SARS-CoV-2 infection and should not be used as the sole basis for patient management decisions. This test has been authorized by the FDA under an Emergency Use Authorization (EUA) for use by authorized laboratories. Fact sheet for Healthcare Providers: ConventionUpdate.co.nz  Fact sheet for Patients: ConventionUpdate.co.nz       Methodology: Isothermal Nucleic Acid Amplification                Radiology reports and films for the last 24 hours have been reviewed. Assessment/Plan:   5/29  Plan for today  -Continue daptomycin plus Zosyn per ID recommendations.   Continue antibiotics until 6/16.  -Continue prednisone 40 mg daily and then taper. Continue DuoNeb. -Bicarb is improved after diamox. Resume Lasix 40 mg PO daily . Monitor BMP  -Case management consulted for SNF placement. Possible discharge to SNF on Tuesday once okay with all consultants      Acute on chronic hypoxic/hypercapnic respiratory failure-resolved  Left-sided pleural effusion- not enough fluid for ultrasound-guided thoracentesis  JOSLYN/OHS on trilogy  COPD on O2  Clinically symptoms improved  Continue oxygen taper  Continue IV steroid taper slowly  Continue trilogy as per pulmonary  Follow clinical course  Seen by pulmonary continue current treatment  On 4 liters of oxygen which is baseline    Left hip fracture displaced  age undetermined  ? left hip osteomyelitis/septic arthritis   Bilateral buttock and sacral wounds    -[facility] evaluated no surgical intervention planned  -S/p  Aspiration?/Bx by IR, culture negative. -ID recommends daptomycin and cefepime on discharge last admit.  End date 6/16.  No statin while on daptomycin. ( ID follow-up was planned on 6/30 as OP. Weekly CBC, CMP, CK & ESR/CRP every other week. Resume Lipitor after completing ABx.)  *Cefepime was changed to IV Zosyn secondary to skin rash suspected from IV cefepime        Metabolic alkalosis- holding Lasix, Diamox 500 IV twice daily x 2 doses  We will continue diamox 500m g I.V x 2 more days. Bicarb is improving. Check bmp in am tomorrow. Chr DHF/PAF: -Continue  amiodarone and xarelto. Lasix held as Met alkalosis  Chronic microcytic anemia-monitor CBC periodically, was on folic acid  Steroid induced hyperglycemia-A1c level 5.4 on 4/21, ISS  HTN controlled.  Cont' PTA meds. Morbid obesity/Impaired mobility/functional quadriplegia-supportive care  Drug rash- suspected from cefepime now changed to Zosyn. Cont steroids      Hypokalemia:  We will replete and monitor     Non ambulatory state: Consult PT/OT    ADFC  DVT PRx on Xarelto  NOK   Dispo: Back to [facility] tomorrow       Signed By: Mattie Sanches MD     May 30, 2022

## 2022-05-31 LAB
ANION GAP SERPL CALC-SCNC: 3 MMOL/L (ref 5–15)
BUN SERPL-MCNC: 26 MG/DL (ref 6–20)
BUN/CREAT SERPL: 54 (ref 12–20)
CALCIUM SERPL-MCNC: 8.3 MG/DL (ref 8.5–10.1)
CHLORIDE SERPL-SCNC: 102 MMOL/L (ref 97–108)
CO2 SERPL-SCNC: 37 MMOL/L (ref 21–32)
CREAT SERPL-MCNC: 0.48 MG/DL (ref 0.55–1.02)
GLUCOSE BLD STRIP.AUTO-MCNC: 132 MG/DL (ref 65–117)
GLUCOSE BLD STRIP.AUTO-MCNC: 169 MG/DL (ref 65–117)
GLUCOSE BLD STRIP.AUTO-MCNC: 267 MG/DL (ref 65–117)
GLUCOSE BLD STRIP.AUTO-MCNC: 304 MG/DL (ref 65–117)
GLUCOSE SERPL-MCNC: 152 MG/DL (ref 65–100)
POTASSIUM SERPL-SCNC: 4.5 MMOL/L (ref 3.5–5.1)
SERVICE CMNT-IMP: ABNORMAL
SODIUM SERPL-SCNC: 142 MMOL/L (ref 136–145)

## 2022-05-31 PROCEDURE — 74011000250 HC RX REV CODE- 250: Performed by: HOSPITALIST

## 2022-05-31 PROCEDURE — 74011250636 HC RX REV CODE- 250/636: Performed by: INTERNAL MEDICINE

## 2022-05-31 PROCEDURE — 77010033678 HC OXYGEN DAILY

## 2022-05-31 PROCEDURE — 74011250637 HC RX REV CODE- 250/637: Performed by: INTERNAL MEDICINE

## 2022-05-31 PROCEDURE — 74011636637 HC RX REV CODE- 636/637: Performed by: INTERNAL MEDICINE

## 2022-05-31 PROCEDURE — 74011000250 HC RX REV CODE- 250: Performed by: INTERNAL MEDICINE

## 2022-05-31 PROCEDURE — 74011636637 HC RX REV CODE- 636/637: Performed by: HOSPITALIST

## 2022-05-31 PROCEDURE — 74011250637 HC RX REV CODE- 250/637

## 2022-05-31 PROCEDURE — 80048 BASIC METABOLIC PNL TOTAL CA: CPT

## 2022-05-31 PROCEDURE — 36415 COLL VENOUS BLD VENIPUNCTURE: CPT

## 2022-05-31 PROCEDURE — 65270000046 HC RM TELEMETRY

## 2022-05-31 PROCEDURE — 94640 AIRWAY INHALATION TREATMENT: CPT

## 2022-05-31 PROCEDURE — 74011250637 HC RX REV CODE- 250/637: Performed by: HOSPITALIST

## 2022-05-31 PROCEDURE — 82962 GLUCOSE BLOOD TEST: CPT

## 2022-05-31 PROCEDURE — 74011000258 HC RX REV CODE- 258: Performed by: HOSPITALIST

## 2022-05-31 PROCEDURE — 74011250636 HC RX REV CODE- 250/636: Performed by: HOSPITALIST

## 2022-05-31 PROCEDURE — 74011000258 HC RX REV CODE- 258: Performed by: INTERNAL MEDICINE

## 2022-05-31 RX ORDER — IPRATROPIUM BROMIDE AND ALBUTEROL SULFATE 2.5; .5 MG/3ML; MG/3ML
3 SOLUTION RESPIRATORY (INHALATION) 2 TIMES DAILY
Status: DISCONTINUED | OUTPATIENT
Start: 2022-05-31 | End: 2022-06-17 | Stop reason: HOSPADM

## 2022-05-31 RX ORDER — METOPROLOL TARTRATE 25 MG/1
25 TABLET, FILM COATED ORAL 2 TIMES DAILY
Status: DISCONTINUED | OUTPATIENT
Start: 2022-05-31 | End: 2022-06-17 | Stop reason: HOSPADM

## 2022-05-31 RX ADMIN — SODIUM CHLORIDE, PRESERVATIVE FREE 30 ML: 5 INJECTION INTRAVENOUS at 03:22

## 2022-05-31 RX ADMIN — IPRATROPIUM BROMIDE AND ALBUTEROL SULFATE 3 ML: .5; 3 SOLUTION RESPIRATORY (INHALATION) at 08:06

## 2022-05-31 RX ADMIN — Medication 5 UNITS: at 22:25

## 2022-05-31 RX ADMIN — DAPTOMYCIN 700 MG: 500 INJECTION, POWDER, LYOPHILIZED, FOR SOLUTION INTRAVENOUS at 10:42

## 2022-05-31 RX ADMIN — GABAPENTIN 100 MG: 100 CAPSULE ORAL at 09:08

## 2022-05-31 RX ADMIN — SODIUM CHLORIDE, PRESERVATIVE FREE 10 ML: 5 INJECTION INTRAVENOUS at 22:26

## 2022-05-31 RX ADMIN — Medication: at 18:00

## 2022-05-31 RX ADMIN — Medication 7 UNITS: at 18:53

## 2022-05-31 RX ADMIN — PIPERACILLIN AND TAZOBACTAM 3.38 G: 3; .375 INJECTION, POWDER, LYOPHILIZED, FOR SOLUTION INTRAVENOUS at 05:07

## 2022-05-31 RX ADMIN — TRAMADOL HYDROCHLORIDE 50 MG: 50 TABLET ORAL at 12:33

## 2022-05-31 RX ADMIN — PREDNISONE 40 MG: 20 TABLET ORAL at 09:08

## 2022-05-31 RX ADMIN — GABAPENTIN 100 MG: 100 CAPSULE ORAL at 15:22

## 2022-05-31 RX ADMIN — Medication: at 09:09

## 2022-05-31 RX ADMIN — IPRATROPIUM BROMIDE AND ALBUTEROL SULFATE 3 ML: .5; 3 SOLUTION RESPIRATORY (INHALATION) at 19:56

## 2022-05-31 RX ADMIN — RIVAROXABAN 20 MG: 20 TABLET, FILM COATED ORAL at 09:07

## 2022-05-31 RX ADMIN — FUROSEMIDE 40 MG: 40 TABLET ORAL at 09:07

## 2022-05-31 RX ADMIN — PIPERACILLIN AND TAZOBACTAM 3.38 G: 3; .375 INJECTION, POWDER, LYOPHILIZED, FOR SOLUTION INTRAVENOUS at 22:25

## 2022-05-31 RX ADMIN — AMIODARONE HYDROCHLORIDE 200 MG: 200 TABLET ORAL at 09:08

## 2022-05-31 RX ADMIN — PIPERACILLIN AND TAZOBACTAM 3.38 G: 3; .375 INJECTION, POWDER, LYOPHILIZED, FOR SOLUTION INTRAVENOUS at 14:55

## 2022-05-31 RX ADMIN — MICONAZOLE NITRATE: 20 CREAM TOPICAL at 09:08

## 2022-05-31 RX ADMIN — METOPROLOL TARTRATE 25 MG: 25 TABLET, FILM COATED ORAL at 18:53

## 2022-05-31 RX ADMIN — MICONAZOLE NITRATE: 20 CREAM TOPICAL at 22:28

## 2022-05-31 RX ADMIN — GABAPENTIN 100 MG: 100 CAPSULE ORAL at 22:27

## 2022-05-31 NOTE — PROGRESS NOTES
ADULT PROTOCOL: JET AEROSOL ASSESSMENT    Patient  Burr Carrel     57 y.o.   female     5/31/2022  2:13 PM    Breath Sounds Pre Procedure: Right Breath Sounds: Diminished                               Left Breath Sounds: Diminished    Breath Sounds Post Procedure: Right Breath Sounds: Diminished                                 Left Breath Sounds: Diminished    Breathing pattern: Pre procedure Breathing Pattern: Regular          Post procedure Breathing Pattern: Regular    Heart Rate: Pre procedure Pulse: 91           Post procedure Pulse: 90    Resp Rate: Pre procedure Respirations: 24           Post procedure Respirations: 16    Oxygen: O2 Device: Nasal cannula  3lpm         SpO2: Pre procedure SpO2: 94 %                Post procedure SpO2: 94 %     Nebulizer Therapy: Current medications Aerosolized Medications: DuoNeb           Problem List:   Patient Active Problem List   Diagnosis Code    COVID-19 ruled out Z20.822    [facility] I48.91    S/P cardiac cath Z98.890    Mixed hyperlipidemia E78.2    Morbid obesity ([facility] E66.01    Acute respiratory failure (HCC) J96.00    Atrial flutter with rapid ventricular response (HCC) I48.92    JOSLYN (obstructive sleep apnea) G47.33    Acute on chronic respiratory failure with hypoxia and hypercapnia (HCC) J96.21, J96.22    SOB (shortness of breath) R06.02    Physical deconditioning R53.81    Counseling regarding advance care planning and goals of care Z71.89    Chronic pain G89.29    Dependence on supplemental oxygen Z99.81    Essential hypertension I10    Heart failure (HCC) I50.9    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

## 2022-05-31 NOTE — PROGRESS NOTES
Transition of Care Plan:    RUR:  22% high risk for readmission  Disposition:  Return to SNF at [facility]  Follow up appointments: To be arranged by SNF  DME needed:  Trilogy machine (being ordered by SNF, settings faxed on 5/31/2022)  Transportation at Discharge: [facility]  Keys or means to access home:  Family has access      IM Medicare Letter:  2nd IMM letter needed prior to discharge  Is patient a BCPI-A Bundle: n/a         If yes, was Bundle Letter given?:    Is patient a [place] and connected with the [place]? n/a               If yes, was [employer] transfer form completed and [facility] notified? Caregiver Contact: Sister Vlad Jane [phone]/ Daughter Angela Gusman ([phone])  Discharge Caregiver contacted prior to discharge? Caregiver to be contacted prior to discharge   Care Conference needed?: not needed at this time       CM contacted Jaqueline Beck (RN liaison for [facility]) to inquire about Trilogy machine. CM faxed trilogy settings to [facility] to work towards ordering trilogy for discharge. CM will inform treatment team when [facility] has trilogy ordered and delivered. Care Management Interventions  PCP Verified by CM: Yes (Visiting Physicians saw patient 10/21)  Palliative Care Criteria Met (RRAT>21 & CHF Dx)?: No  Mode of Transport at Discharge: BLS  Transition of Care Consult (CM Consult): Discharge Planning,SNF  Partner SNF: No  Reason Why Partner SNF Not Chosen:  (At [facility] prior to this admission)  MyChart Signup: No  Discharge Durable Medical Equipment: No  Physical Therapy Consult: No  Occupational Therapy Consult: No  Speech Therapy Consult: No  Support Systems: Child(dejuan) (daughter and granddtr)  Confirm Follow Up Transport: Other (see comment) (Squad)  [employer] Information Provided?: No  Discharge Location  Patient Expects to be Discharged to[de-identified] Skilled nursing facility    Khalif Wayne, [address] - ED [facility]  [employer] ACP Facilitator  Zone Phone: [phone]

## 2022-05-31 NOTE — PROGRESS NOTES
Nocturnist NP Note         2014- Notified by nursing the patient is complaining of leg pain with burning. Patient received tramadol last night and it seemed to give her some relief. Orders placed. Please note that this note was dictated using Dragon computer voice recognition software. Quite often unanticipated grammatical, syntax, homophones, and other interpretive errors are inadvertently transcribed by the computer software. Please disregard these errors. Please excuse any errors that have escaped final proofreading.

## 2022-05-31 NOTE — PROGRESS NOTES
Hospitalist Progress Note    NAME: German Azevedo   :  1960   MRN:  [id number]     Subjective:   Patient was seen and examined. No acute events overnight. Discussed with RN overnight events. All patient's questions were answered.     \"Doing okay\"    Current Facility-Administered Medications   Medication Dose Route Frequency    glucose chewable tablet 16 g  4 Tablet Oral PRN    dextrose 10% infusion 0-250 mL  0-250 mL IntraVENous PRN    glucagon (GLUCAGEN) injection 1 mg  1 mg IntraMUSCular PRN    insulin lispro (HUMALOG) injection   SubCUTAneous AC&HS    predniSONE (DELTASONE) tablet 40 mg  40 mg Oral DAILY WITH BREAKFAST    furosemide (LASIX) tablet 40 mg  40 mg Oral DAILY    albuterol-ipratropium (DUO-NEB) 2.5 MG-0.5 MG/3 ML  3 mL Nebulization TID    ammonium lactate (LAC-HYDRIN) 12 % lotion   Topical BID    miconazole (SECURA) 2 % extra thick cream   Topical BID    lidocaine 4 % patch 2 Patch  2 Patch TransDERmal Q24H    diphenhydrAMINE (BENADRYL) capsule 25 mg  25 mg Oral Q6H PRN    sodium chloride (NS) flush 5-40 mL  5-40 mL IntraVENous Q8H    sodium chloride (NS) flush 5-40 mL  5-40 mL IntraVENous PRN    acetaminophen (TYLENOL) tablet 650 mg  650 mg Oral Q6H PRN    Or    acetaminophen (TYLENOL) suppository 650 mg  650 mg Rectal Q6H PRN    polyethylene glycol (MIRALAX) packet 17 g  17 g Oral DAILY PRN    ondansetron (ZOFRAN ODT) tablet 4 mg  4 mg Oral Q8H PRN    Or    ondansetron (ZOFRAN) injection 4 mg  4 mg IntraVENous Q6H PRN    albuterol-ipratropium (DUO-NEB) 2.5 MG-0.5 MG/3 ML  3 mL Nebulization Q4H PRN    amiodarone (CORDARONE) tablet 200 mg  200 mg Oral DAILY    gabapentin (NEURONTIN) capsule 100 mg  100 mg Oral TID    rivaroxaban (XARELTO) tablet 20 mg  20 mg Oral DAILY WITH BREAKFAST    DAPTOmycin (CUBICIN) 700 mg in 0.9% sodium chloride 50 mL IVPB  8 mg/kg (Adjusted) IntraVENous Q24H    piperacillin-tazobactam (ZOSYN) 3.375 g in 0.9% sodium chloride (MBP/ADV) 100 mL MBP  3.375 g IntraVENous Q8H          Objective:     Visit Vitals  BP (!) 131/49 (BP 1 Location: Right lower arm, BP Patient Position: At rest;Supine)   Pulse (!) 104   Temp 98 °F (36.7 °C)   Resp (!) 31   Wt 137.8 kg (303 lb 12.8 oz)   SpO2 92%   BMI 53.82 kg/m²    O2 Flow Rate (L/min): 3 l/min O2 Device: Nasal cannula    Temp (24hrs), Av.1 °F (36.7 °C), Min:97.9 °F (36.6 °C), Max:98.3 °F (36.8 °C)        PHYSICAL EXAM:  General obese  Neck Short  CVS RRR  Respiratory symmetric expansion, crackles at bases, no wheezing  Abdomen morbidly obese, soft, nontender  Extremities nonpitting edema bilateral lower extremities  Neuro EOMs intact. No facial asymmetry. No aphasia or slurred speech. Symmetrical strength, Sensation grossly intact.  Alert and oriented X 4.     Psych not in acute distress, good insight  Skin erythematous rash on chest and upper extremities improving      Data Review    Recent Results (from the past 24 hour(s))   GLUCOSE, POC    Collection Time: 22  4:55 PM   Result Value Ref Range    Glucose (POC) 266 (H) 65 - 117 mg/dL    Performed by Sebastián Sam PCT    GLUCOSE, POC    Collection Time: 22  8:38 PM   Result Value Ref Range    Glucose (POC) 251 (H) 65 - 117 mg/dL    Performed by Kike Evans (PCT)    METABOLIC PANEL, BASIC    Collection Time: 22  5:17 AM   Result Value Ref Range    Sodium 142 136 - 145 mmol/L    Potassium 4.5 3.5 - 5.1 mmol/L    Chloride 102 97 - 108 mmol/L    CO2 37 (H) 21 - 32 mmol/L    Anion gap 3 (L) 5 - 15 mmol/L    Glucose 152 (H) 65 - 100 mg/dL    BUN 26 (H) 6 - 20 MG/DL    Creatinine 0.48 (L) 0.55 - 1.02 MG/DL    BUN/Creatinine ratio 54 (H) 12 - 20      GFR est AA >60 >60 ml/min/1.73m2    GFR est non-AA >60 >60 ml/min/1.73m2    Calcium 8.3 (L) 8.5 - 10.1 MG/DL   GLUCOSE, POC    Collection Time: 22  7:22 AM   Result Value Ref Range    Glucose (POC) 132 (H) 65 - 117 mg/dL    Performed by Dwain LAU    GLUCOSE, POC Collection Time: 05/31/22 10:55 AM   Result Value Ref Range    Glucose (POC) 169 (H) 65 - 117 mg/dL    Performed by Ivone Arroyo PCT      No results found for this visit on 05/23/22. All Micro Results     Procedure Component Value Units Date/Time    CULTURE, BLOOD, PAIRED [[id number]] Collected: 05/23/22 1447    Order Status: Completed Specimen: Blood Updated: 05/28/22 0848     Special Requests: NO SPECIAL REQUESTS        Culture result: NO GROWTH 5 DAYS       COVID-19 RAPID TEST [[id number]] Collected: 05/23/22 1447    Order Status: Completed Specimen: Nasopharyngeal Updated: 05/23/22 1549     Specimen source Nasopharyngeal        COVID-19 rapid test Not detected        Comment: Rapid Abbott ID Now       Rapid NAAT:  The specimen is NEGATIVE for SARS-CoV-2, the novel coronavirus associated with COVID-19. Negative results should be treated as presumptive and, if inconsistent with clinical signs and symptoms or necessary for patient management, should be tested with an alternative molecular assay. Negative results do not preclude SARS-CoV-2 infection and should not be used as the sole basis for patient management decisions. This test has been authorized by the FDA under an Emergency Use Authorization (EUA) for use by authorized laboratories. Fact sheet for Healthcare Providers: ConventionUpdate.co.nz  Fact sheet for Patients: ConventionUpdate.co.nz       Methodology: Isothermal Nucleic Acid Amplification                Radiology reports and films for the last 24 hours have been reviewed. Assessment/Plan:         Acute on chronic hypoxic/hypercapnic respiratory failure-resolved  Left-sided pleural effusion- not enough fluid for ultrasound-guided thoracentesis  JOSLYN/OHS on trilogy  COPD on O2  Clinically symptoms improved  Continue oxygen taper  IV steroids switched to prednisone, taper  Continue trilogy as per pulmonary.   Discussed with  and the work is ongoing to find the Trelegy after discharge, input is appreciated  Seen by pulmonary continue current treatment  On3 liters of oxygen which is baseline    Left hip fracture displaced  age undetermined  ? left hip osteomyelitis/septic arthritis   Bilateral buttock and sacral wounds    -[facility] evaluated no surgical intervention planned  -S/p  Aspiration?/Bx by IR, culture negative. -ID recommends daptomycin and cefepime on discharge last admit.  End date 6/16.  No statin while on daptomycin. ( ID follow-up was planned on 6/30 as OP. Weekly CBC, CMP, CK & ESR/CRP every other week. Resume Lipitor after completing ABx.)  *Cefepime was changed to IV Zosyn secondary to skin rash suspected from IV cefepime        Metabolic alkalosis-  Diamox 500 IV twice daily x 2 doses were given  We will continue diamox 500m g I.V x 2 more days. Bicarb is improving. Check bmp in am tomorrow. Chronic diastolic heart failure/PAF: -Continue  amiodarone and xarelto. Lasix held as Met alkalosis, then it was resumed again on May 30  Chronic microcytic anemia-monitor CBC periodically, was on folic acid. Hemoglobin stable  Steroid induced hyperglycemia-A1c level 5.4 on 4/21, ISS  HTN controlled.  Cont' PTA meds. Morbid obesity/Impaired mobility/functional quadriplegia-supportive care  Drug rash- suspected from cefepime now changed to Zosyn. Cont steroids      Hypokalemia: Resolved    Non ambulatory state: Consult PT/OT    ADFC  DVT PRx on Xarelto  NOK   disposition to SNF once available.          Signed By: Ivone Banegas MD     May 31, 2022

## 2022-05-31 NOTE — PROGRESS NOTES
Bedside and Verbal shift change report given to Kiersten Staton (oncoming nurse) by Chen Johnson (offgoing nurse). Report included the following information SBAR, Kardex, Procedure Summary, Intake/Output, MAR and Recent Results. 2000: Patient with complaints of pain in legs and burning at hip and knee, Tylenol given. Jeny UNGER for further pain medication, Tramadol ordered PRN for bedtime    2125: Tramadol given with evening medications for patient's pain in bilateral legs.     2230: Patient placed on home CPAP machine to sleep

## 2022-05-31 NOTE — PROGRESS NOTES
Comprehensive Nutrition Assessment    Type and Reason for Visit: Initial,RD nutrition re-screen/LOS    Nutrition Recommendations/Plan:   1. Continue current diet     Nutrition Assessment:    Patient medically noted for respiratory failure. PMH HTN, CHF, and COPD. Chart reviewed for length of stay. Receiving an easy to chew/Consistent carb/NAKIA diet. Good PO intake and appetite noted per flowsheets. Stable for discharge per discussion on PCU rounds. Continue current nutrition plan of care. Patient Vitals for the past 168 hrs:   % Diet Eaten   05/29/22 1600 76 - 100%   05/29/22 1115 76 - 100%   05/29/22 0800 76 - 100%   05/28/22 1826 51 - 75%   05/28/22 1349 76 - 100%   05/28/22 0830 76 - 100%   05/27/22 1320 51 - 75%      Nutrition Related Findings:     [phone]  Lasix, Humalog, Prednisone     Wound Type: Skin tears (wound care note: The skin on the Pressure injury is pink and moist and now healing well. Still needs moist wound care. The mid sacrum / coccyx wound is open and bleeding and patient has several skin tears from friction / shear, including her upper back which is calloused skin with dyschromia)    Current Nutrition Intake & Therapies:  Average Meal Intake: %     ADULT DIET Easy to Chew; 4 carb choices (60 gm/meal); No Salt Added (3-4 gm)    Anthropometric Measures:  Height: 5' 3\" (160 cm)  Ideal Body Weight (IBW): 115 lbs (52 kg)     Current Body Wt:  137.8 kg (303 lb 12.7 oz), 264.2 % IBW. Current BMI (kg/m2): 53.8                          BMI Category: Obese class 3 (BMI 40.0 or greater)    Estimated Daily Nutrient Needs:  Energy Requirements Based On: Formula  Weight Used for Energy Requirements: Current  Energy (kcal/day): 1991 kcal (BMR 1909 x 1. 2AF -300kcal)  Weight Used for Protein Requirements: Current  Protein (g/day): 110g (0.8 g/kg bw)  Method Used for Fluid Requirements: 1 ml/kcal  Fluid (ml/day): 1800 mL or per MD    Nutrition Diagnosis:   No nutrition diagnosis at this time     Nutrition Interventions:   Food and/or Nutrient Delivery: Continue current diet  Nutrition Education/Counseling: No recommendations at this time  Coordination of Nutrition Care: Continue to monitor while inpatient       Goals:     Goals:  (PO intake >70% of meals next 5-7 days)       Nutrition Monitoring and Evaluation:   Behavioral-Environmental Outcomes: None identified  Food/Nutrient Intake Outcomes: Food and nutrient intake  Physical Signs/Symptoms Outcomes: Biochemical data,Skin,Weight    Discharge Planning:    Continue current diet    Lex Abreu RD  Contact: ext [phone]

## 2022-05-31 NOTE — PROGRESS NOTES
End of Shift Note    Bedside shift change report given to laila (oncoming nurse) by Tanya Morel RN (offgoing nurse). Report included the following information SBAR, Kardex and MAR    Shift worked:  7a-7p     Shift summary and any significant changes:          Concerns for physician to address:       Zone phone for oncoming shift:          Activity:  Activity Level: Bed Rest  Number times ambulated in hallways past shift: 0  Number of times OOB to chair past shift: 0    Cardiac:   Cardiac Monitoring: Yes      Cardiac Rhythm: Sinus Rhythm    Access:   Current line(s): PIV /picc    Genitourinary:   Urinary status: external catheter    Respiratory:   O2 Device: Nasal cannula  Chronic home O2 use?: NO  Incentive spirometer at bedside: NO       GI:  Last Bowel Movement Date: 05/30/22  Current diet:  ADULT DIET Easy to Chew; 4 carb choices (60 gm/meal); No Salt Added (3-4 gm)  Passing flatus: YES  Tolerating current diet: YES       Pain Management:   Patient states pain is manageable on current regimen: YES    Skin:  Gaston Score: 15  Interventions: turn team, float heels, PT/OT consult and internal/external urinary devices    Patient Safety:  Fall Score:  Total Score: 2  Interventions: call bell within reach       Length of Stay:  Expected LOS: 3d 14h  Actual LOS: [address], RN

## 2022-06-01 LAB
ANION GAP SERPL CALC-SCNC: 1 MMOL/L (ref 5–15)
BUN SERPL-MCNC: 22 MG/DL (ref 6–20)
BUN/CREAT SERPL: 49 (ref 12–20)
CALCIUM SERPL-MCNC: 8.3 MG/DL (ref 8.5–10.1)
CHLORIDE SERPL-SCNC: 100 MMOL/L (ref 97–108)
CO2 SERPL-SCNC: 42 MMOL/L (ref 21–32)
CREAT SERPL-MCNC: 0.45 MG/DL (ref 0.55–1.02)
ERYTHROCYTE [DISTWIDTH] IN BLOOD BY AUTOMATED COUNT: 15.4 % (ref 11.5–14.5)
GLUCOSE BLD STRIP.AUTO-MCNC: 160 MG/DL (ref 65–117)
GLUCOSE BLD STRIP.AUTO-MCNC: 221 MG/DL (ref 65–117)
GLUCOSE BLD STRIP.AUTO-MCNC: 258 MG/DL (ref 65–117)
GLUCOSE BLD STRIP.AUTO-MCNC: 88 MG/DL (ref 65–117)
GLUCOSE SERPL-MCNC: 111 MG/DL (ref 65–100)
HCT VFR BLD AUTO: 25.7 % (ref 35–47)
HGB BLD-MCNC: 7.5 G/DL (ref 11.5–16)
MCH RBC QN AUTO: 30.5 PG (ref 26–34)
MCHC RBC AUTO-ENTMCNC: 29.2 G/DL (ref 30–36.5)
MCV RBC AUTO: 104.5 FL (ref 80–99)
NRBC # BLD: 0.03 K/UL (ref 0–0.01)
NRBC BLD-RTO: 0.2 PER 100 WBC
PLATELET # BLD AUTO: 122 K/UL (ref 150–400)
PMV BLD AUTO: 12.4 FL (ref 8.9–12.9)
POTASSIUM SERPL-SCNC: 3.8 MMOL/L (ref 3.5–5.1)
RBC # BLD AUTO: 2.46 M/UL (ref 3.8–5.2)
SERVICE CMNT-IMP: ABNORMAL
SERVICE CMNT-IMP: NORMAL
SODIUM SERPL-SCNC: 143 MMOL/L (ref 136–145)
WBC # BLD AUTO: 13.6 K/UL (ref 3.6–11)

## 2022-06-01 PROCEDURE — 80048 BASIC METABOLIC PNL TOTAL CA: CPT

## 2022-06-01 PROCEDURE — 74011250637 HC RX REV CODE- 250/637: Performed by: STUDENT IN AN ORGANIZED HEALTH CARE EDUCATION/TRAINING PROGRAM

## 2022-06-01 PROCEDURE — 94660 CPAP INITIATION&MGMT: CPT

## 2022-06-01 PROCEDURE — 85027 COMPLETE CBC AUTOMATED: CPT

## 2022-06-01 PROCEDURE — 74011250636 HC RX REV CODE- 250/636: Performed by: INTERNAL MEDICINE

## 2022-06-01 PROCEDURE — 74011250636 HC RX REV CODE- 250/636: Performed by: STUDENT IN AN ORGANIZED HEALTH CARE EDUCATION/TRAINING PROGRAM

## 2022-06-01 PROCEDURE — 74011250637 HC RX REV CODE- 250/637: Performed by: HOSPITALIST

## 2022-06-01 PROCEDURE — 74011250637 HC RX REV CODE- 250/637: Performed by: INTERNAL MEDICINE

## 2022-06-01 PROCEDURE — 74011000250 HC RX REV CODE- 250: Performed by: HOSPITALIST

## 2022-06-01 PROCEDURE — 65270000046 HC RM TELEMETRY

## 2022-06-01 PROCEDURE — 74011636637 HC RX REV CODE- 636/637: Performed by: INTERNAL MEDICINE

## 2022-06-01 PROCEDURE — 94640 AIRWAY INHALATION TREATMENT: CPT

## 2022-06-01 PROCEDURE — 74011250636 HC RX REV CODE- 250/636: Performed by: HOSPITALIST

## 2022-06-01 PROCEDURE — 74011000250 HC RX REV CODE- 250: Performed by: INTERNAL MEDICINE

## 2022-06-01 PROCEDURE — 74011000258 HC RX REV CODE- 258: Performed by: HOSPITALIST

## 2022-06-01 PROCEDURE — 36415 COLL VENOUS BLD VENIPUNCTURE: CPT

## 2022-06-01 PROCEDURE — 74011636637 HC RX REV CODE- 636/637: Performed by: HOSPITALIST

## 2022-06-01 PROCEDURE — 74011000258 HC RX REV CODE- 258: Performed by: INTERNAL MEDICINE

## 2022-06-01 PROCEDURE — 82962 GLUCOSE BLOOD TEST: CPT

## 2022-06-01 RX ORDER — VANCOMYCIN/0.9 % SOD CHLORIDE 1.5G/250ML
1500 PLASTIC BAG, INJECTION (ML) INTRAVENOUS EVERY 12 HOURS
Status: DISCONTINUED | OUTPATIENT
Start: 2022-06-02 | End: 2022-06-03

## 2022-06-01 RX ORDER — ACETAZOLAMIDE 500 MG/1
500 CAPSULE, EXTENDED RELEASE ORAL EVERY 12 HOURS
Status: DISCONTINUED | OUTPATIENT
Start: 2022-06-01 | End: 2022-06-04

## 2022-06-01 RX ADMIN — PREDNISONE 40 MG: 20 TABLET ORAL at 09:02

## 2022-06-01 RX ADMIN — Medication 2 UNITS: at 12:16

## 2022-06-01 RX ADMIN — VANCOMYCIN HYDROCHLORIDE 1500 MG: 10 INJECTION, POWDER, LYOPHILIZED, FOR SOLUTION INTRAVENOUS at 23:21

## 2022-06-01 RX ADMIN — Medication 5 UNITS: at 17:08

## 2022-06-01 RX ADMIN — SODIUM CHLORIDE, PRESERVATIVE FREE 10 ML: 5 INJECTION INTRAVENOUS at 05:59

## 2022-06-01 RX ADMIN — Medication: at 18:00

## 2022-06-01 RX ADMIN — GABAPENTIN 100 MG: 100 CAPSULE ORAL at 09:02

## 2022-06-01 RX ADMIN — IPRATROPIUM BROMIDE AND ALBUTEROL SULFATE 3 ML: .5; 3 SOLUTION RESPIRATORY (INHALATION) at 07:59

## 2022-06-01 RX ADMIN — SODIUM CHLORIDE, PRESERVATIVE FREE 10 ML: 5 INJECTION INTRAVENOUS at 15:20

## 2022-06-01 RX ADMIN — PIPERACILLIN AND TAZOBACTAM 3.38 G: 3; .375 INJECTION, POWDER, LYOPHILIZED, FOR SOLUTION INTRAVENOUS at 05:58

## 2022-06-01 RX ADMIN — PIPERACILLIN AND TAZOBACTAM 3.38 G: 3; .375 INJECTION, POWDER, LYOPHILIZED, FOR SOLUTION INTRAVENOUS at 15:20

## 2022-06-01 RX ADMIN — ACETAZOLAMIDE 500 MG: 500 CAPSULE, EXTENDED RELEASE ORAL at 11:28

## 2022-06-01 RX ADMIN — ACETAZOLAMIDE 500 MG: 500 CAPSULE, EXTENDED RELEASE ORAL at 21:27

## 2022-06-01 RX ADMIN — RIVAROXABAN 20 MG: 20 TABLET, FILM COATED ORAL at 09:02

## 2022-06-01 RX ADMIN — IPRATROPIUM BROMIDE AND ALBUTEROL SULFATE 3 ML: .5; 3 SOLUTION RESPIRATORY (INHALATION) at 19:42

## 2022-06-01 RX ADMIN — MICONAZOLE NITRATE: 20 CREAM TOPICAL at 21:26

## 2022-06-01 RX ADMIN — VANCOMYCIN HYDROCHLORIDE 2500 MG: 10 INJECTION, POWDER, LYOPHILIZED, FOR SOLUTION INTRAVENOUS at 12:16

## 2022-06-01 RX ADMIN — GABAPENTIN 100 MG: 100 CAPSULE ORAL at 21:27

## 2022-06-01 RX ADMIN — Medication: at 09:02

## 2022-06-01 RX ADMIN — Medication 2 UNITS: at 22:18

## 2022-06-01 RX ADMIN — GABAPENTIN 100 MG: 100 CAPSULE ORAL at 17:08

## 2022-06-01 RX ADMIN — DAPTOMYCIN 700 MG: 500 INJECTION, POWDER, LYOPHILIZED, FOR SOLUTION INTRAVENOUS at 11:28

## 2022-06-01 RX ADMIN — METOPROLOL TARTRATE 25 MG: 25 TABLET, FILM COATED ORAL at 09:02

## 2022-06-01 RX ADMIN — AMIODARONE HYDROCHLORIDE 200 MG: 200 TABLET ORAL at 09:02

## 2022-06-01 RX ADMIN — PIPERACILLIN AND TAZOBACTAM 3.38 G: 3; .375 INJECTION, POWDER, LYOPHILIZED, FOR SOLUTION INTRAVENOUS at 21:27

## 2022-06-01 RX ADMIN — METOPROLOL TARTRATE 25 MG: 25 TABLET, FILM COATED ORAL at 17:08

## 2022-06-01 RX ADMIN — MICONAZOLE NITRATE: 20 CREAM TOPICAL at 09:04

## 2022-06-01 RX ADMIN — SODIUM CHLORIDE, PRESERVATIVE FREE 10 ML: 5 INJECTION INTRAVENOUS at 21:28

## 2022-06-01 NOTE — PROGRESS NOTES
Hospitalist Progress Note    NAME: Tea Vu   :  1960   MRN:  [id number]       Assessment / Plan:     Acute on chronic hypoxic/hypercapnic respiratory failure-resolved  Left-sided pleural effusion- not enough fluid for ultrasound-guided thoracentesis  JOSLYN/OHS on trilogy  COPD on O2    Clinically symptoms improved  Tapering oral steroids  Continue trilogy as per pulmonary. Discussed with  and the work is ongoing to find the Trelegy after discharge, input is appreciated  Seen by pulmonary continue current treatment  On 3 liters of oxygen which is baseline     Left hip fracture displaced  age undetermined  ? left hip osteomyelitis/septic arthritis   Bilateral buttock and sacral wounds    -[facility] evaluated no surgical intervention planned  -S/p  Aspiration?/Bx by IR, culture negative. -ID recommends daptomycin and cefepime on discharge last admit.  End date . Renzo Antonio go to SNF with Daptomycin, switched to vancomycin today, ID agrees with this per pharmacy note.     Metabolic alkalosis  Lasix held and started on Diamox    Chronic diastolic heart failure/PAF:   Continue  amiodarone and xarelto. Lasix held as Met alkalosis, then it was resumed again on May 30  Will hold again. Chronic microcytic anemia-  monitor CBC periodically, was on folic acid. Hemoglobin stable    Steroid induced hyperglycemia-A1c level 5.4 on , ISS    HTN controlled.  Cont' PTA meds. Morbid obesity/Impaired mobility/functional quadriplegia-supportive care  Drug rash- suspected from cefepime now changed to Zosyn.  Cont steroids     Hypokalemia: Resolved     Non ambulatory state: Consult PT/OT     ADFC  DVT PRx on Xarelto  NOK  TOM:      Subjective:     Chief Complaint / Reason for Physician Visit  She remains on 3 liters oxygen  Feels better    Review of Systems:  Symptom Y/N Comments  Symptom Y/N Comments   Fever/Chills    Chest Pain     Poor Appetite    Edema     Cough    Abdominal Pain Sputum    Joint Pain     SOB/CHOPRA    Pruritis/Rash     Nausea/vomit    Tolerating PT/OT     Diarrhea    Tolerating Diet     Constipation    Other       Could NOT obtain due to:      Objective:     VITALS:   Last 24hrs VS reviewed since prior progress note. Most recent are:  Patient Vitals for the past 24 hrs:   Temp Pulse Resp BP SpO2   06/01/22 1211 -- 75 (!) 38 (!) 138/57 92 %   06/01/22 1041 98.7 °F (37.1 °C) 78 26 (!) 130/54 93 %   06/01/22 0800 -- -- -- -- 96 %   06/01/22 0730 98.2 °F (36.8 °C) 69 20 (!) 137/52 100 %   06/01/22 0359 -- -- -- -- 100 %   06/01/22 0315 98 °F (36.7 °C) 64 22 (!) 108/50 100 %   06/01/22 0214 -- -- -- -- 96 %   06/01/22 0100 98 °F (36.7 °C) 68 22 (!) 115/57 95 %   05/31/22 2030 98 °F (36.7 °C) 75 29 139/69 96 %   05/31/22 1957 -- -- -- -- 97 %       Intake/Output Summary (Last 24 hours) at 6/1/2022 1440  Last data filed at 6/1/2022 0100  Gross per 24 hour   Intake 780 ml   Output 1400 ml   Net -620 ml        I had a face to face encounter and independently examined this patient on 6/1/2022, as outlined below:  PHYSICAL EXAM:  General: Awake, No acute distress  EENT:  EOMI. Anicteric sclerae. MMM  Resp:  CTA bilaterally, no wheezing or rales. No accessory muscle use  CV:  Regular  rhythm,  No edema  GI:  Soft, Non distended, Non tender. +Bowel sounds  Neurologic:  Alert and oriented X 3, normal speech,   Psych:   Not anxious nor agitated  Skin:  No rashes.   No jaundice    Reviewed most current lab test results and cultures  YES  Reviewed most current radiology test results   YES  Review and summation of old records today    NO  Reviewed patient's current orders and MAR    YES  PMH/SH reviewed - no change compared to H&P  ________________________________________________________________________  Care Plan discussed with:    Comments   Patient y    Family      RN y    Care Manager     Consultant                        Multidiciplinary team rounds were held today with , nursing, pharmacist and clinical coordinator. Patient's plan of care was discussed; medications were reviewed and discharge planning was addressed. ________________________________________________________________________  Total NON critical care TIME: 38   Minutes    Total CRITICAL CARE TIME Spent:   Minutes non procedure based      Comments   >50% of visit spent in counseling and coordination of care     ________________________________________________________________________  Grace Heart MD     Procedures: see electronic medical records for all procedures/Xrays and details which were not copied into this note but were reviewed prior to creation of Plan. LABS:  I reviewed today's most current labs and imaging studies.   Pertinent labs include:  Recent Labs     06/01/22 0312 05/30/22  0431   WBC 13.6* 13.4*   HGB 7.5* 8.9*   HCT 25.7* 30.4*   * 125*     Recent Labs     06/01/22 0312 05/31/22  0517 05/30/22  0431    142 140   K 3.8 4.5 4.0    102 101   CO2 42* 37* 35*   * 152* 247*   BUN 22* 26* 27*   CREA 0.45* 0.48* 0.57   CA 8.3* 8.3* 8.7   MG  --   --  2.8*   PHOS  --   --  2.5*   ALB  --   --  2.3*   TBILI  --   --  0.6   ALT  --   --  67       Signed: Grace Heart MD

## 2022-06-01 NOTE — PROGRESS NOTES
ID Note:     Notified that daptomycin not covered by nursing home/SNF. New abx orders:    IV vancomycin 1500mg q12h, pharmacy to adjust dose to maintain trough between 15-20 .   End date 6/16  Once Weekly CBC, CMP,  & ESR/CRP, vancomycin trough,  fax reports to [phone], call with critical labs at [place] [id number] at end of therapy on 6/16  If there is PICC malfunction and PICC nurse cannot reposition please send  Patient to ED right away  Adequate fluids, daily probiotic/yogurt  ID [id number] on 6/30 at 01257 Kamryn Calderón MD  Infectious Diseases

## 2022-06-01 NOTE — PROGRESS NOTES
Pharmacy Antimicrobial Kinetic Dosing    Indication for Antimicrobials: Bone & Joint Infection    Current Regimen of Each Antimicrobial:  Vancomycin Pharmacy to Dose (Start Date ; Day # 1)  Zosyn 3.375 g IV q8h     Previous Antimicrobial Therapy:  Daptomycin     Goal Level: AUC: 400-600 mg/hr/Liter/day    Date Dose & Interval Measured (mcg/mL) Predicted AUC/SUDHA                       Date & time of next level: pending    Dosing calculator used: Vendor Registry calculator    Significant Positive Cultures:    blood: ng, final    Conditions for Dosing Consideration: None    Labs:  Recent Labs     22   CREA 0.45* 0.48* 0.57   BUN 22* 26* 27*     Recent Labs     22  043   WBC 13.6* 13.4*     Temp (24hrs), Av.2 °F (36.8 °C), Min:98 °F (36.7 °C), Max:98.7 °F (37.1 °C)      Creatinine Clearance (mL/min):   CrCl (Ideal Body Weight): 68.9   If actual weight < IBW: CrCl (Actual Body Weight) 178.4    Impression/Plan:   Vancomycin 2,500 mg IV loading dose order   Ordered vancomycin 1,500 mg IV every 12hr for a projected AUC/SUDHA ratio of 467   Antimicrobial stop date:      Pharmacy will follow daily and adjust medications as appropriate for renal function and/or serum levels.     Thank you,  Tejas Rose, PHARMD

## 2022-06-02 ENCOUNTER — APPOINTMENT (OUTPATIENT)
Dept: GENERAL RADIOLOGY | Age: 62
DRG: 189 | End: 2022-06-02
Attending: STUDENT IN AN ORGANIZED HEALTH CARE EDUCATION/TRAINING PROGRAM
Payer: MEDICARE

## 2022-06-02 PROBLEM — D64.9 ANEMIA: Status: ACTIVE | Noted: 2022-06-02

## 2022-06-02 LAB
ANION GAP SERPL CALC-SCNC: 3 MMOL/L (ref 5–15)
BUN SERPL-MCNC: 21 MG/DL (ref 6–20)
BUN/CREAT SERPL: 43 (ref 12–20)
CALCIUM SERPL-MCNC: 8.2 MG/DL (ref 8.5–10.1)
CHLORIDE SERPL-SCNC: 102 MMOL/L (ref 97–108)
CO2 SERPL-SCNC: 37 MMOL/L (ref 21–32)
CREAT SERPL-MCNC: 0.49 MG/DL (ref 0.55–1.02)
ERYTHROCYTE [DISTWIDTH] IN BLOOD BY AUTOMATED COUNT: 15.7 % (ref 11.5–14.5)
GLUCOSE BLD STRIP.AUTO-MCNC: 190 MG/DL (ref 65–117)
GLUCOSE BLD STRIP.AUTO-MCNC: 217 MG/DL (ref 65–117)
GLUCOSE BLD STRIP.AUTO-MCNC: 279 MG/DL (ref 65–117)
GLUCOSE BLD STRIP.AUTO-MCNC: 98 MG/DL (ref 65–117)
GLUCOSE SERPL-MCNC: 165 MG/DL (ref 65–100)
HCT VFR BLD AUTO: 25.7 % (ref 35–47)
HEMOCCULT STL QL: POSITIVE
HGB BLD-MCNC: 7.3 G/DL (ref 11.5–16)
HISTORY CHECKED?,CKHIST: NORMAL
MCH RBC QN AUTO: 30.4 PG (ref 26–34)
MCHC RBC AUTO-ENTMCNC: 28.4 G/DL (ref 30–36.5)
MCV RBC AUTO: 107.1 FL (ref 80–99)
NRBC # BLD: 0.04 K/UL (ref 0–0.01)
NRBC BLD-RTO: 0.3 PER 100 WBC
PLATELET # BLD AUTO: 133 K/UL (ref 150–400)
PMV BLD AUTO: 12.7 FL (ref 8.9–12.9)
POTASSIUM SERPL-SCNC: 3.8 MMOL/L (ref 3.5–5.1)
RBC # BLD AUTO: 2.4 M/UL (ref 3.8–5.2)
SERVICE CMNT-IMP: ABNORMAL
SERVICE CMNT-IMP: NORMAL
SODIUM SERPL-SCNC: 142 MMOL/L (ref 136–145)
VIT B12 SERPL-MCNC: 491 PG/ML (ref 193–986)
WBC # BLD AUTO: 14.9 K/UL (ref 3.6–11)

## 2022-06-02 PROCEDURE — 30233N1 TRANSFUSION OF NONAUTOLOGOUS RED BLOOD CELLS INTO PERIPHERAL VEIN, PERCUTANEOUS APPROACH: ICD-10-PCS | Performed by: INTERNAL MEDICINE

## 2022-06-02 PROCEDURE — 36430 TRANSFUSION BLD/BLD COMPNT: CPT

## 2022-06-02 PROCEDURE — 74011250637 HC RX REV CODE- 250/637: Performed by: STUDENT IN AN ORGANIZED HEALTH CARE EDUCATION/TRAINING PROGRAM

## 2022-06-02 PROCEDURE — 86923 COMPATIBILITY TEST ELECTRIC: CPT

## 2022-06-02 PROCEDURE — C9113 INJ PANTOPRAZOLE SODIUM, VIA: HCPCS | Performed by: STUDENT IN AN ORGANIZED HEALTH CARE EDUCATION/TRAINING PROGRAM

## 2022-06-02 PROCEDURE — 83921 ORGANIC ACID SINGLE QUANT: CPT

## 2022-06-02 PROCEDURE — 82607 VITAMIN B-12: CPT

## 2022-06-02 PROCEDURE — 85027 COMPLETE CBC AUTOMATED: CPT

## 2022-06-02 PROCEDURE — 74011000258 HC RX REV CODE- 258: Performed by: HOSPITALIST

## 2022-06-02 PROCEDURE — 94640 AIRWAY INHALATION TREATMENT: CPT

## 2022-06-02 PROCEDURE — 74011250637 HC RX REV CODE- 250/637: Performed by: INTERNAL MEDICINE

## 2022-06-02 PROCEDURE — 74011250636 HC RX REV CODE- 250/636: Performed by: STUDENT IN AN ORGANIZED HEALTH CARE EDUCATION/TRAINING PROGRAM

## 2022-06-02 PROCEDURE — 74011250637 HC RX REV CODE- 250/637: Performed by: HOSPITALIST

## 2022-06-02 PROCEDURE — 74011000250 HC RX REV CODE- 250: Performed by: INTERNAL MEDICINE

## 2022-06-02 PROCEDURE — 86900 BLOOD TYPING SEROLOGIC ABO: CPT

## 2022-06-02 PROCEDURE — 74011636637 HC RX REV CODE- 636/637: Performed by: INTERNAL MEDICINE

## 2022-06-02 PROCEDURE — 36415 COLL VENOUS BLD VENIPUNCTURE: CPT

## 2022-06-02 PROCEDURE — 65270000046 HC RM TELEMETRY

## 2022-06-02 PROCEDURE — 74011000250 HC RX REV CODE- 250: Performed by: STUDENT IN AN ORGANIZED HEALTH CARE EDUCATION/TRAINING PROGRAM

## 2022-06-02 PROCEDURE — 71045 X-RAY EXAM CHEST 1 VIEW: CPT

## 2022-06-02 PROCEDURE — 82272 OCCULT BLD FECES 1-3 TESTS: CPT

## 2022-06-02 PROCEDURE — 74011000250 HC RX REV CODE- 250: Performed by: HOSPITALIST

## 2022-06-02 PROCEDURE — 74011250636 HC RX REV CODE- 250/636: Performed by: HOSPITALIST

## 2022-06-02 PROCEDURE — 82962 GLUCOSE BLOOD TEST: CPT

## 2022-06-02 PROCEDURE — 74011000258 HC RX REV CODE- 258: Performed by: STUDENT IN AN ORGANIZED HEALTH CARE EDUCATION/TRAINING PROGRAM

## 2022-06-02 PROCEDURE — 80048 BASIC METABOLIC PNL TOTAL CA: CPT

## 2022-06-02 PROCEDURE — P9016 RBC LEUKOCYTES REDUCED: HCPCS

## 2022-06-02 PROCEDURE — 74011636637 HC RX REV CODE- 636/637: Performed by: HOSPITALIST

## 2022-06-02 RX ORDER — SODIUM CHLORIDE 9 MG/ML
250 INJECTION, SOLUTION INTRAVENOUS AS NEEDED
Status: DISCONTINUED | OUTPATIENT
Start: 2022-06-02 | End: 2022-06-09 | Stop reason: ALTCHOICE

## 2022-06-02 RX ORDER — PREDNISONE 20 MG/1
20 TABLET ORAL
Status: DISCONTINUED | OUTPATIENT
Start: 2022-06-03 | End: 2022-06-04

## 2022-06-02 RX ADMIN — AMIODARONE HYDROCHLORIDE 200 MG: 200 TABLET ORAL at 08:18

## 2022-06-02 RX ADMIN — MICONAZOLE NITRATE: 20 CREAM TOPICAL at 08:20

## 2022-06-02 RX ADMIN — SODIUM CHLORIDE 40 MG: 9 INJECTION INTRAMUSCULAR; INTRAVENOUS; SUBCUTANEOUS at 21:56

## 2022-06-02 RX ADMIN — METOPROLOL TARTRATE 25 MG: 25 TABLET, FILM COATED ORAL at 08:20

## 2022-06-02 RX ADMIN — PIPERACILLIN AND TAZOBACTAM 3.38 G: 3; .375 INJECTION, POWDER, LYOPHILIZED, FOR SOLUTION INTRAVENOUS at 22:05

## 2022-06-02 RX ADMIN — IPRATROPIUM BROMIDE AND ALBUTEROL SULFATE 3 ML: .5; 3 SOLUTION RESPIRATORY (INHALATION) at 21:34

## 2022-06-02 RX ADMIN — IPRATROPIUM BROMIDE AND ALBUTEROL SULFATE 3 ML: .5; 3 SOLUTION RESPIRATORY (INHALATION) at 09:37

## 2022-06-02 RX ADMIN — RIVAROXABAN 20 MG: 20 TABLET, FILM COATED ORAL at 08:19

## 2022-06-02 RX ADMIN — GABAPENTIN 100 MG: 100 CAPSULE ORAL at 17:14

## 2022-06-02 RX ADMIN — Medication 2 UNITS: at 21:57

## 2022-06-02 RX ADMIN — Medication 5 UNITS: at 17:14

## 2022-06-02 RX ADMIN — SODIUM CHLORIDE, PRESERVATIVE FREE 10 ML: 5 INJECTION INTRAVENOUS at 06:03

## 2022-06-02 RX ADMIN — VANCOMYCIN HYDROCHLORIDE 1500 MG: 10 INJECTION, POWDER, LYOPHILIZED, FOR SOLUTION INTRAVENOUS at 14:37

## 2022-06-02 RX ADMIN — SODIUM CHLORIDE, PRESERVATIVE FREE 10 ML: 5 INJECTION INTRAVENOUS at 14:34

## 2022-06-02 RX ADMIN — Medication 2 UNITS: at 12:30

## 2022-06-02 RX ADMIN — SODIUM CHLORIDE 40 MG: 9 INJECTION INTRAMUSCULAR; INTRAVENOUS; SUBCUTANEOUS at 10:58

## 2022-06-02 RX ADMIN — PIPERACILLIN AND TAZOBACTAM 3.38 G: 3; .375 INJECTION, POWDER, LYOPHILIZED, FOR SOLUTION INTRAVENOUS at 06:01

## 2022-06-02 RX ADMIN — GABAPENTIN 100 MG: 100 CAPSULE ORAL at 21:56

## 2022-06-02 RX ADMIN — METOPROLOL TARTRATE 25 MG: 25 TABLET, FILM COATED ORAL at 17:14

## 2022-06-02 RX ADMIN — PREDNISONE 40 MG: 20 TABLET ORAL at 08:19

## 2022-06-02 RX ADMIN — ACETAZOLAMIDE 500 MG: 500 CAPSULE, EXTENDED RELEASE ORAL at 08:18

## 2022-06-02 RX ADMIN — ACETAZOLAMIDE 500 MG: 500 CAPSULE, EXTENDED RELEASE ORAL at 21:56

## 2022-06-02 RX ADMIN — MICONAZOLE NITRATE: 20 CREAM TOPICAL at 21:57

## 2022-06-02 RX ADMIN — Medication: at 17:14

## 2022-06-02 RX ADMIN — Medication: at 08:19

## 2022-06-02 RX ADMIN — SODIUM CHLORIDE, PRESERVATIVE FREE 10 ML: 5 INJECTION INTRAVENOUS at 21:57

## 2022-06-02 RX ADMIN — PIPERACILLIN AND TAZOBACTAM 3.38 G: 3; .375 INJECTION, POWDER, LYOPHILIZED, FOR SOLUTION INTRAVENOUS at 14:34

## 2022-06-02 RX ADMIN — GABAPENTIN 100 MG: 100 CAPSULE ORAL at 08:19

## 2022-06-02 NOTE — CONSULTS
Gastroenterology Consultation Note  Jacky Krabbe, PA   for Dr. Jose Manuel Narayanan    NAME: Jm Pickett : 1960 MRN: [id number]   ATTG: Dr. Riri Alston PCP: Unknown, Provider, MD  Date/Time:  2022 8:54 AM  Subjective:   REASON FOR CONSULT:      Jm Pickett is a 57 y.o.  female who I was asked to see for anemia of unknown origin. 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 Presented with hypoxia noted at SNF. According to admission notes has a hx of recurrent admissions for the same. Initially sent to the ICU and eventually stabilized and moved to the floor. Also with left hip fracture that is displaced with possible left hip septic arthritis/ostomyelitis. Has bilateral buttock and sacral wounds as well. Hgb has been trending down since admission. She reports tech or RN noticed some BRB in stool 1-2 days ago. Question of if blood was coming from wound vs her stool. Denies any melena. No abdominal pain, N/V. She reports chronic dysphagia following choking on a burrito in the '80s where she almost  in front of her daughter. She also thinks that she has a HH seen incidentally when she had an MRI of her spine years ago. She denies any previous EGD or colonoscopy in the past.  No prior hx of anemia of blood transfusions. Results for Blanka Nolasco (MRN [id number]) as of 2022 08:53   Ref.  Range 2022 14:47 2022 19:34 2022 03:40 2022 03:27 2022 02:25 2022 06:12 2022 03:38 2022 04:31 2022 03:12 2022 01:08   HGB Latest Ref Range: 11.5 - 16.0 g/dL 11.4 (L) 10.4 (L) 10.4 (L) 9.9 (L) 9.4 (L) 9.3 (L) 8.8 (L) 8.9 (L) 7.5 (L) 7.3 (L)   HCT Latest Ref Range: 35.0 - 47.0 % 42.4 37.5 36.8 34.4 (L) 32.2 (L) 32.3 (L) 30.3 (L) 30.4 (L) 25.7 (L) 25.7 (L)   MCV Latest Ref Range: 80.0 - 99.0 .2 (H) 110.0 (H) 107.6 (H) 104.9 (H) 105.2 (H) 105.9 (H) 104.1 (H) 103.4 (H) 104.5 (H) 107.1 (H)     Today she is feeing increased SOB more so than prior. No chest pain, F/C. Awaiting iron and ferritin. She reports two sisters with colon polyps. No other family hx of colon, stomach or esophageal cancer. No tobacco or EtOH use. Past Medical History:   Diagnosis Date    Acute on chronic respiratory failure with hypoxia and hypercapnia (HCC) 11/20/2020    Atrial fibrillation ([facility] [address] 11/4/2020    CH[facility]estive [facility] failure[facility])     Diabetes ([facility] [address]     Hypertension     Ill-defined condition     high cholesterol    Ill-defined condition     tachycardia    JOSLYN (obstructive sleep apnea) 11/20/2020    Other and unspecified symptoms and signs involving general sensations and perceptions     ruptured disc    S/P cardiac cath 11/4/2020    11/3/2020 nonobstructive disease      Past Surgical History:   Procedure Laterality Date    [facility] COMPRE ELECTROPHYSIOL XM W/LEFT ATRIAL PACNG/REC N/A 11/23/2020    Lt Atrial Pace & Record During Ep Study performed by Mulu Menjivar MD at hospitals CARDIAC CATH LAB    [facility] COMPRE EP EVAL ABLTJ 3D MAPG TX SVT N/A 11/23/2020    ABLATION A-FLUTTER performed by Mulu Menjivar MD at hospitals CARDIAC CATH LAB    [facility] ICAR CATHETER ABLATION ARRHYTHMIA ADD ON N/A 11/23/2020    Ablation Svt/Vt Add On performed by Mulu Menjivar MD at hospitals CARDIAC CATH LAB    WV INTRACARDIAC ELECTROPHYSIOLOGIC 3D MAPPING N/A 11/23/2020    Ep 3d Mapping performed by Mulu Menjivar MD at hospitals CARDIAC CATH LAB    [facility] STIM/PACING HEART POST IV DRUG INFU N/A 11/23/2020    Drug Stimulation performed by Mulu Menjivar MD at hospitals CARDIAC CATH LAB     Social History     Tobacco Use    Smoking status: Former Smoker    Smokeless tobacco: Never Used   Substance Use Topics    Alcohol use: No      No family history on file.    Allergies   Allergen Reactions    Cefepime Rash    Cefazolin Rash    Other Plant, Animal, Environmental Rash Ivory soap caused rash on hands. Home Medications:  Prior to Admission Medications   Prescriptions Last Dose Informant Patient Reported? Taking? DAPTOmycin (CUBICIN) IVPB   No No   Si mg/kg daily   L.acid,para-B. bifidum-S.therm (RISAQUAD) 8 billion cell cap cap   No No   Sig: Take 1 Capsule by mouth daily for 36 days. acetaminophen (TYLENOL) 325 mg tablet  Transfer Papers Yes No   Sig: Take 650 mg by mouth every six (6) hours as needed for Pain. albuterol-ipratropium (DUO-NEB) 2.5 mg-0.5 mg/3 ml nebu   No No   Sig: 3 mL by Nebulization route every four (4) hours as needed for Wheezing, Shortness of Breath or Respiratory Distress. amiodarone (CORDARONE) 200 mg tablet   No No   Sig: Take 1 Tablet by mouth daily for 30 days. atorvastatin (LIPITOR) 10 mg tablet   No No   Sig: Take 1 Tablet by mouth nightly. START AFTER COMPLETING ANTIBIOTICS TREATMENT ON    cefepime 2 gram 2 g IVPB   No No   Si g by IntraVENous route every eight (8) hours for 30 days. folic acid (FOLVITE) 1 mg tablet   No No   Sig: Take 1 Tablet by mouth daily for 30 days. furosemide (LASIX) 40 mg tablet  Self No No   Sig: Take 1 Tab by mouth daily. Patient taking differently: Take 20 mg by mouth daily. Takes prn when fluid overloaded   gabapentin (NEURONTIN) 100 mg capsule   No No   Sig: Take 1 Capsule by mouth three (3) times daily for 30 days. Max Daily Amount: 300 mg.   metoprolol (LOPRESSOR) 25 mg tablet  Transfer Papers Yes No   Sig: Take 25 mg by mouth two (2) times a day. rivaroxaban (XARELTO) 20 mg tab tablet   No No   Sig: Take 1 Tablet by mouth daily for 30 days.       Facility-Administered Medications: None     Hospital medications:  Current Facility-Administered Medications   Medication Dose Route Frequency    pantoprazole (PROTONIX) 40 mg in 0.9% sodium chloride 10 mL injection  40 mg IntraVENous Q12H    acetaZOLAMIDE SR (DIAMOX) capsule 500 mg  500 mg Oral Q12H    vancomycin (VANCOCIN) 1500 mg in NS 500 ml infusion  1,500 mg IntraVENous Q12H    albuterol-ipratropium (DUO-NEB) 2.5 MG-0.5 MG/3 ML  3 mL Nebulization BID    metoprolol tartrate (LOPRESSOR) tablet 25 mg  25 mg Oral BID    glucose chewable tablet 16 g  4 Tablet Oral PRN    dextrose 10% infusion 0-250 mL  0-250 mL IntraVENous PRN    glucagon (GLUCAGEN) injection 1 mg  1 mg IntraMUSCular PRN    insulin lispro (HUMALOG) injection   SubCUTAneous AC&HS    predniSONE (DELTASONE) tablet 40 mg  40 mg Oral DAILY WITH BREAKFAST    [Held by provider] furosemide (LASIX) tablet 40 mg  40 mg Oral DAILY    ammonium lactate (LAC-HYDRIN) 12 % lotion   Topical BID    miconazole (SECURA) 2 % extra thick cream   Topical BID    lidocaine 4 % patch 2 Patch  2 Patch TransDERmal Q24H    diphenhydrAMINE (BENADRYL) capsule 25 mg  25 mg Oral Q6H PRN    sodium chloride (NS) flush 5-40 mL  5-40 mL IntraVENous Q8H    sodium chloride (NS) flush 5-40 mL  5-40 mL IntraVENous PRN    acetaminophen (TYLENOL) tablet 650 mg  650 mg Oral Q6H PRN    Or    acetaminophen (TYLENOL) suppository 650 mg  650 mg Rectal Q6H PRN    polyethylene glycol (MIRALAX) packet 17 g  17 g Oral DAILY PRN    ondansetron (ZOFRAN ODT) tablet 4 mg  4 mg Oral Q8H PRN    Or    ondansetron (ZOFRAN) injection 4 mg  4 mg IntraVENous Q6H PRN    amiodarone (CORDARONE) tablet 200 mg  200 mg Oral DAILY    gabapentin (NEURONTIN) capsule 100 mg  100 mg Oral TID    [Held by provider] rivaroxaban (XARELTO) tablet 20 mg  20 mg Oral DAILY WITH BREAKFAST    piperacillin-tazobactam (ZOSYN) 3.375 g in 0.9% sodium chloride (MBP/ADV) 100 mL MBP  3.375 g IntraVENous Q8H     REVIEW OF SYSTEMS:     []     Unable to obtain  ROS due to  []    mental status change  []    sedated   []    intubated  Review of Systems -   History obtained from   General ROS: negative for - chills, fever or weight loss  Psychological ROS: negative for - anxiety or depression  Hematological and Lymphatic ROS: negative for - bleeding problems  Respiratory ROS: positive for - shortness of breath and wheezing  Cardiovascular ROS: negative for - chest pain  Gastrointestinal ROS: See HPI  Genito-Urinary ROS: no dysuria, trouble voiding, or hematuria  Musculoskeletal ROS: positive for - joint pain and joint swelling  Neurological ROS: no TIA or stroke symptoms  Dermatological ROS: positive for - multiple pressure sores    Objective:   VITALS:    Visit Vitals  BP (!) 116/55 (BP 1 Location: Right lower arm)   Pulse 63   Temp 98.2 °F (36.8 °C)   Resp 20   Ht 5' 3\" (1.6 m)   Wt 135.6 kg (299 lb)   SpO2 92%   BMI 52.97 kg/m²     Temp (24hrs), Av.3 °F (36.8 °C), Min:98 °F (36.7 °C), Max:98.7 °F (37.1 °C)    PHYSICAL EXAM:   General:    Alert, cooperative, morbidly obese WF, in mild distress, appears older than stated age. Head:   Normocephalic, without obvious abnormality, atraumatic. Eyes:   Conjunctivae clear, anicteric sclerae. Pupils are equal  Nose:  Nares normal. No drainage or sinus tenderness. Throat:    Lips, mucosa, and tongue normal.  No Thrush  Lungs:   Decreased breath sounds with expiratory wheezes  Heart:   Regular rate and rhythm,  no murmur, rub or gallop. Abdomen:   Soft, non-tender. Not distended. Bowel sounds normal. No masses. No                            hepatosplenomegaly. Rectal:  Deferred  Extremities: No cyanosis. Pitting edema BLE. No clubbing  Skin:     Texture, turgor normal. No rashes/lesions/jaundice  Lymph: Cervical, supraclavicular normal.  Psych:  Good insight. Not depressed. Not anxious or agitated. Neurologic: EOMs intact. No facial asymmetry. No aphasia or slurred speech normal strength, A/O X 3.      LAB DATA REVIEWED:    Lab Results   Component Value Date/Time    WBC 14.9 (H) 2022 01:08 AM    HGB 7.3 (L) 2022 01:08 AM    HCT 25.7 (L) 2022 01:08 AM    PLATELET 429 (L)  01:08 AM    .1 (H) 2022 01:08 AM     Lab Results   Component Value Date/Time    ALT (SGPT) 67 05/30/2022 04:31 AM    Alk. phosphatase 350 (H) 05/30/2022 04:31 AM    Bilirubin, direct 0.2 11/04/2020 03:45 AM    Bilirubin, total 0.6 05/30/2022 04:31 AM     Lab Results   Component Value Date/Time    Sodium 142 06/02/2022 01:08 AM    Potassium 3.8 06/02/2022 01:08 AM    Chloride 102 06/02/2022 01:08 AM    CO2 37 (H) 06/02/2022 01:08 AM    Anion gap 3 (L) 06/02/2022 01:08 AM    Glucose 165 (H) 06/02/2022 01:08 AM    BUN 21 (H) 06/02/2022 01:08 AM    Creatinine 0.49 (L) 06/02/2022 01:08 AM    BUN/Creatinine ratio 43 (H) 06/02/2022 01:08 AM    GFR est AA >60 06/02/2022 01:08 AM    GFR est non-AA >60 06/02/2022 01:08 AM    Calcium 8.2 (L) 06/02/2022 01:08 AM     Lab Results   Component Value Date/Time    Lipase 123 05/04/2022 04:53 AM     Lab Results   Component Value Date/Time    INR 1.4 (H) 11/24/2020 02:59 AM    INR 1.4 (H) 11/23/2020 03:25 AM    INR 1.4 (H) 11/22/2020 04:38 AM    Prothrombin time 14.6 (H) 11/24/2020 02:59 AM    Prothrombin time 14.8 (H) 11/23/2020 03:25 AM    Prothrombin time 14.9 (H) 11/22/2020 04:38 AM       IMAGING RESULTS:   []      I have personally reviewed the actual   []    CXR  []    CT  []     US    Impression:  Active Problems:    Acute respiratory failure ([address] (11/18/2020)      Anemia (6/2/2022)    Plan:  Patient with slow downtrend of hgb from 11 range on admission to 7.3 today. Anemia is macrocytic and awaiting iron, ferritin, B12, and folate labs. She has never had any endoscopic eval in the past and reports dysphagia with HH. I think it is reasonable to at least start with an EGD once her respiratory status improves and she has been off Xarelto for over 24 hours. Procedure reviewed with patient including risks, benefits and alternatives and she is in agreement with proceeding. A colonoscopy would be challenging given morbid obesity, pressure ulcers on buttock/back, and bed bound state. If EGD is non revealing and above labs are consistent with ESTELA then we can revisit. RN aware of worsening SOB, she is getting respiratory to given breathing treatment. Repeat CXR ordered. If no fluid overload then would benefit from transfusion today. Further eval per primary care team.         ___________________________________________________  Care Plan discussed with:    [x]    Patient   []    Family   [x]    Nursing   []    Attending  Total Time : 30   minutes   ___________________________________________________  GI: Toula Cooks, PA       The patient was seen and examined independently by me. I have discussed the case with the mid-level provider in detail. I have reviewed the patient's chart and the note. I personally performed all components of the history, physical, and medical decision making and agree with the assessment and plan, with minor modifications as noted. Please see APPs note for full details. Physical exam:  General:AAO x 3, morbidly obese, lying in bed, mild shortness of breath, gives a good history. HEENT:  EOMI,   Chest:  CTA,Heart: S1, S2, RRR  GI: Soft, NT, ND + bowel sounds  Extremities: No edema    Data Review:  reviewed     Impression:   Macrocytic anemia  Respiratory failure  Obesity  Sleep apnea    Plan and discussion:  · We are asked to see this 48-year-old  lady who has been in the hospital for about 10 days also with slowly drifting lower hemoglobin with macrocytosis. Questionable blood seen by RN but this was not confirmed if of GI origin. Patient has never had an EGD and colonoscopy. Still requiring oxygen and is SOB. ·  Agree with ruling out causes of anemia prior to pursuing an upper endoscopy/colonoscopy as she has very high ASA score and would not be an ideal sedation candidate. ·  Would hope that primary care team and/or pulmonologist can help optimize her breathing status in case an EGD needs to performed. Pt not interested in a colonoscopy. ·   She is also on Xarelto which makes procedure timing tricky. Hold am dose.   · I will make her NPO after MN  for a possible EGD tomorrow afternoon, if she is cleared by IM/Pulm/anesthesia. She is high risk for any kind of sedation. · Plan d/w her RN in person. Signed By: Shruti Ricardo.  Felicia Sanders MD    6/2/2022  2:35 PM

## 2022-06-02 NOTE — PROGRESS NOTES
Hospitalist Progress Note    NAME: Celsa Ralph   :  1960   MRN:  [id number]       Assessment / Plan:   Acute on chronic hypoxic/hypercapnic respiratory failure-resolved  Left-sided pleural effusion- not enough fluid for ultrasound-guided thoracentesis  JOSLYN/OHS on trilogy  COPD on O2    Clinically symptoms improved  Tapering oral steroids  Continue trilogy device as per pulmonary. Discussed with  and the work is ongoing to find the Trelegy after discharge, input is appreciated  Seen by pulmonary continue current treatment  On 3 liters of oxygen which is baseline    Acute on chronic anemia:  Hb downtrending to 7.3, gradually every day  ?some blood in stool last night. Protonix 40 q12 hours  Hold xarelto  GI consulted  Will transfuse a unit today given hx of CHF      Left hip fracture displaced  age undetermined  Left hip osteomyelitis/septic arthritis   Bilateral buttock and sacral wounds      -[facility] evaluated no surgical intervention planned  -S/p  Aspiration?/Bx by IR, culture negative. -ID recommends daptomycin and cefepime on discharge last admit.  End date . Deysi Banerjee go to SNF with Daptomycin, switched to vancomycin today, ID agrees with this      Metabolic alkalosis  Improving, continue with diamox  Hold lasix for now    Chronic diastolic heart failure/PAF:   Continue  amiodarone . Lasix held as Met alkalosis, then it was resumed again on May 30  Will hold again. Hold xarelto    Steroid induced hyperglycemia-A1c level 5.4 on , ISS    HTN controlled.  Cont' PTA meds.     Morbid obesity/Impaired mobility/functional quadriplegia-supportive care  Drug rash- suspected from cefepime now resolved.     Hypokalemia: Resolved     Non ambulatory state: Consult PT/OT     ADFC  DVT PRx on Xarelto  NOK  TOM:      Subjective:     Chief Complaint / Reason for Physician Visit  She remains on 3 liters oxygen  Denies any chest pain, mentions she was told that she had some blood in stool    Review of Systems:  Symptom Y/N Comments  Symptom Y/N Comments   Fever/Chills    Chest Pain     Poor Appetite    Edema     Cough    Abdominal Pain     Sputum    Joint Pain     SOB/CHOPRA    Pruritis/Rash     Nausea/vomit    Tolerating PT/OT     Diarrhea    Tolerating Diet     Constipation    Other       Could NOT obtain due to:      Objective:     VITALS:   Last 24hrs VS reviewed since prior progress note. Most recent are:  Patient Vitals for the past 24 hrs:   Temp Pulse Resp BP SpO2   06/02/22 1040 98 °F (36.7 °C) 69 19 (!) 124/54 90 %   06/02/22 0937 -- -- -- -- 95 %   06/02/22 0725 98.2 °F (36.8 °C) 63 20 (!) 116/55 92 %   06/02/22 0400 98 °F (36.7 °C) 62 20 (!) 111/51 97 %   06/02/22 0320 -- -- -- -- 96 %   06/01/22 2330 98.1 °F (36.7 °C) 70 24 132/62 92 %   06/01/22 2250 -- -- -- -- 96 %   06/01/22 1941 -- -- -- -- 98 %   06/01/22 1930 98.1 °F (36.7 °C) 72 23 (!) 127/57 94 %   06/01/22 1509 98.5 °F (36.9 °C) 83 24 (!) 129/59 97 %       Intake/Output Summary (Last 24 hours) at 6/2/2022 1347  Last data filed at 6/2/2022 0601  Gross per 24 hour   Intake 1080 ml   Output 600 ml   Net 480 ml        I had a face to face encounter and independently examined this patient on 6/2/2022, as outlined below:  PHYSICAL EXAM:  General: Awake, No acute distress  EENT:  EOMI. Anicteric sclerae. MMM  Resp:  CTA bilaterally, no wheezing or rales. No accessory muscle use  CV:  Regular  rhythm,  No edema  GI:  Soft, Non distended, Non tender. +Bowel sounds  Neurologic:  Alert and oriented X 3, normal speech,   Psych:   Not anxious nor agitated  Skin:  No rashes.   No jaundice    Reviewed most current lab test results and cultures  YES  Reviewed most current radiology test results   YES  Review and summation of old records today    NO  Reviewed patient's current orders and MAR    YES  PMH/SH reviewed - no change compared to H&P  ________________________________________________________________________  Care Plan discussed with:    Comments   Patient y    Family      RN y    Care Manager     Consultant                        Multidiciplinary team rounds were held today with , nursing, pharmacist and clinical coordinator. Patient's plan of care was discussed; medications were reviewed and discharge planning was addressed. ________________________________________________________________________  Total NON critical care TIME: 38   Minutes    Total CRITICAL CARE TIME Spent:   Minutes non procedure based      Comments   >50% of visit spent in counseling and coordination of care     ________________________________________________________________________  Tiff Dominguez MD     Procedures: see electronic medical records for all procedures/Xrays and details which were not copied into this note but were reviewed prior to creation of Plan. LABS:  I reviewed today's most current labs and imaging studies.   Pertinent labs include:  Recent Labs     06/02/22 0108 06/01/22 0312   WBC 14.9* 13.6*   HGB 7.3* 7.5*   HCT 25.7* 25.7*   * 122*     Recent Labs     06/02/22 0108 06/01/22 0312 05/31/22  0517    143 142   K 3.8 3.8 4.5    100 102   CO2 37* 42* 37*   * 111* 152*   BUN 21* 22* 26*   CREA 0.49* 0.45* 0.48*   CA 8.2* 8.3* 8.3*       Signed: Tiff Dominguez MD

## 2022-06-03 ENCOUNTER — ANESTHESIA (OUTPATIENT)
Dept: SURGERY | Age: 62
DRG: 189 | End: 2022-06-03
Payer: MEDICARE

## 2022-06-03 ENCOUNTER — ANESTHESIA EVENT (OUTPATIENT)
Dept: SURGERY | Age: 62
DRG: 189 | End: 2022-06-03
Payer: MEDICARE

## 2022-06-03 LAB
ABO + RH BLD: NORMAL
ANION GAP SERPL CALC-SCNC: 3 MMOL/L (ref 5–15)
ARTERIAL PATENCY WRIST A: POSITIVE
BASE EXCESS BLD CALC-SCNC: 3.4 MMOL/L
BDY SITE: ABNORMAL
BLD PROD TYP BPU: NORMAL
BLOOD GROUP ANTIBODIES SERPL: NORMAL
BPU ID: NORMAL
BUN SERPL-MCNC: 19 MG/DL (ref 6–20)
BUN/CREAT SERPL: 40 (ref 12–20)
CALCIUM SERPL-MCNC: 8.2 MG/DL (ref 8.5–10.1)
CHLORIDE SERPL-SCNC: 105 MMOL/L (ref 97–108)
CK SERPL-CCNC: 12 U/L (ref 26–192)
CO2 SERPL-SCNC: 35 MMOL/L (ref 21–32)
COVID-19 RAPID TEST, COVR: NOT DETECTED
CREAT SERPL-MCNC: 0.47 MG/DL (ref 0.55–1.02)
CROSSMATCH RESULT,%XM: NORMAL
DATE LAST DOSE: ABNORMAL
ERYTHROCYTE [DISTWIDTH] IN BLOOD BY AUTOMATED COUNT: 18.5 % (ref 11.5–14.5)
FERRITIN SERPL-MCNC: 93 NG/ML (ref 26–388)
FOLATE SERPL-MCNC: 5.9 NG/ML (ref 5–21)
GAS FLOW.O2 O2 DELIVERY SYS: ABNORMAL L/MIN
GLUCOSE BLD STRIP.AUTO-MCNC: 160 MG/DL (ref 65–117)
GLUCOSE BLD STRIP.AUTO-MCNC: 191 MG/DL (ref 65–117)
GLUCOSE BLD STRIP.AUTO-MCNC: 263 MG/DL (ref 65–117)
GLUCOSE BLD STRIP.AUTO-MCNC: 89 MG/DL (ref 65–117)
GLUCOSE SERPL-MCNC: 105 MG/DL (ref 65–100)
H PYLORI FROM TISSUE: NEGATIVE
HCO3 BLD-SCNC: 30.6 MMOL/L (ref 22–26)
HCT VFR BLD AUTO: 29.1 % (ref 35–47)
HGB BLD-MCNC: 8.3 G/DL (ref 11.5–16)
IRON SATN MFR SERPL: 33 % (ref 20–50)
IRON SERPL-MCNC: 84 UG/DL (ref 35–150)
KIT LOT NO., HCLOLOT: NORMAL
MCH RBC QN AUTO: 30 PG (ref 26–34)
MCHC RBC AUTO-ENTMCNC: 28.5 G/DL (ref 30–36.5)
MCV RBC AUTO: 105.1 FL (ref 80–99)
NEGATIVE CONTROL: NEGATIVE
NRBC # BLD: 0.03 K/UL (ref 0–0.01)
NRBC BLD-RTO: 0.2 PER 100 WBC
O2/TOTAL GAS SETTING VFR VENT: 4 %
PCO2 BLD: 61.4 MMHG (ref 35–45)
PH BLD: 7.31 [PH] (ref 7.35–7.45)
PLATELET # BLD AUTO: 135 K/UL (ref 150–400)
PMV BLD AUTO: 12.6 FL (ref 8.9–12.9)
PO2 BLD: 68 MMHG (ref 80–100)
POSITIVE CONTROL: POSITIVE
POTASSIUM SERPL-SCNC: 3.7 MMOL/L (ref 3.5–5.1)
RBC # BLD AUTO: 2.77 M/UL (ref 3.8–5.2)
REPORTED DOSE,DOSE: ABNORMAL UNITS
REPORTED DOSE/TIME,TMG: ABNORMAL
SAO2 % BLD: 90.6 % (ref 92–97)
SERVICE CMNT-IMP: ABNORMAL
SERVICE CMNT-IMP: NORMAL
SODIUM SERPL-SCNC: 143 MMOL/L (ref 136–145)
SOURCE, COVRS: NORMAL
SPECIMEN EXP DATE BLD: NORMAL
SPECIMEN TYPE: ABNORMAL
STATUS OF UNIT,%ST: NORMAL
TIBC SERPL-MCNC: 256 UG/DL (ref 250–450)
UNIT DIVISION, %UDIV: 0
VANCOMYCIN SERPL-MCNC: 45.8 UG/ML
VANCOMYCIN TROUGH SERPL-MCNC: 37.1 UG/ML (ref 5–10)
WBC # BLD AUTO: 16.3 K/UL (ref 3.6–11)

## 2022-06-03 PROCEDURE — 36600 WITHDRAWAL OF ARTERIAL BLOOD: CPT

## 2022-06-03 PROCEDURE — C1726 CATH, BAL DIL, NON-VASCULAR: HCPCS | Performed by: INTERNAL MEDICINE

## 2022-06-03 PROCEDURE — 82803 BLOOD GASES ANY COMBINATION: CPT

## 2022-06-03 PROCEDURE — 77010033678 HC OXYGEN DAILY

## 2022-06-03 PROCEDURE — 2709999900 HC NON-CHARGEABLE SUPPLY: Performed by: INTERNAL MEDICINE

## 2022-06-03 PROCEDURE — 74011000250 HC RX REV CODE- 250: Performed by: INTERNAL MEDICINE

## 2022-06-03 PROCEDURE — 88305 TISSUE EXAM BY PATHOLOGIST: CPT

## 2022-06-03 PROCEDURE — 0D758ZZ DILATION OF ESOPHAGUS, VIA NATURAL OR ARTIFICIAL OPENING ENDOSCOPIC: ICD-10-PCS | Performed by: INTERNAL MEDICINE

## 2022-06-03 PROCEDURE — 0DB68ZX EXCISION OF STOMACH, VIA NATURAL OR ARTIFICIAL OPENING ENDOSCOPIC, DIAGNOSTIC: ICD-10-PCS | Performed by: INTERNAL MEDICINE

## 2022-06-03 PROCEDURE — 74011636637 HC RX REV CODE- 636/637: Performed by: HOSPITALIST

## 2022-06-03 PROCEDURE — 94640 AIRWAY INHALATION TREATMENT: CPT

## 2022-06-03 PROCEDURE — 36415 COLL VENOUS BLD VENIPUNCTURE: CPT

## 2022-06-03 PROCEDURE — 80048 BASIC METABOLIC PNL TOTAL CA: CPT

## 2022-06-03 PROCEDURE — 76210000063 HC OR PH I REC FIRST 0.5 HR: Performed by: INTERNAL MEDICINE

## 2022-06-03 PROCEDURE — 80202 ASSAY OF VANCOMYCIN: CPT

## 2022-06-03 PROCEDURE — 76010000154 HC OR TIME FIRST 0.5 HR: Performed by: INTERNAL MEDICINE

## 2022-06-03 PROCEDURE — 82962 GLUCOSE BLOOD TEST: CPT

## 2022-06-03 PROCEDURE — 74011000250 HC RX REV CODE- 250: Performed by: HOSPITALIST

## 2022-06-03 PROCEDURE — 82746 ASSAY OF FOLIC ACID SERUM: CPT

## 2022-06-03 PROCEDURE — 0DB98ZX EXCISION OF DUODENUM, VIA NATURAL OR ARTIFICIAL OPENING ENDOSCOPIC, DIAGNOSTIC: ICD-10-PCS | Performed by: INTERNAL MEDICINE

## 2022-06-03 PROCEDURE — 74011000250 HC RX REV CODE- 250: Performed by: STUDENT IN AN ORGANIZED HEALTH CARE EDUCATION/TRAINING PROGRAM

## 2022-06-03 PROCEDURE — 74011250637 HC RX REV CODE- 250/637: Performed by: INTERNAL MEDICINE

## 2022-06-03 PROCEDURE — 85027 COMPLETE CBC AUTOMATED: CPT

## 2022-06-03 PROCEDURE — 83540 ASSAY OF IRON: CPT

## 2022-06-03 PROCEDURE — 74011250637 HC RX REV CODE- 250/637: Performed by: HOSPITALIST

## 2022-06-03 PROCEDURE — 94660 CPAP INITIATION&MGMT: CPT

## 2022-06-03 PROCEDURE — 74011250636 HC RX REV CODE- 250/636: Performed by: ANESTHESIOLOGY

## 2022-06-03 PROCEDURE — 65270000046 HC RM TELEMETRY

## 2022-06-03 PROCEDURE — 76060000031 HC ANESTHESIA FIRST 0.5 HR: Performed by: INTERNAL MEDICINE

## 2022-06-03 PROCEDURE — C9113 INJ PANTOPRAZOLE SODIUM, VIA: HCPCS | Performed by: STUDENT IN AN ORGANIZED HEALTH CARE EDUCATION/TRAINING PROGRAM

## 2022-06-03 PROCEDURE — 82728 ASSAY OF FERRITIN: CPT

## 2022-06-03 PROCEDURE — 74011000258 HC RX REV CODE- 258: Performed by: STUDENT IN AN ORGANIZED HEALTH CARE EDUCATION/TRAINING PROGRAM

## 2022-06-03 PROCEDURE — 74011250636 HC RX REV CODE- 250/636: Performed by: STUDENT IN AN ORGANIZED HEALTH CARE EDUCATION/TRAINING PROGRAM

## 2022-06-03 PROCEDURE — 74011636637 HC RX REV CODE- 636/637: Performed by: STUDENT IN AN ORGANIZED HEALTH CARE EDUCATION/TRAINING PROGRAM

## 2022-06-03 PROCEDURE — 87635 SARS-COV-2 COVID-19 AMP PRB: CPT

## 2022-06-03 PROCEDURE — 77030010936 HC CLP LIG BSC -C: Performed by: INTERNAL MEDICINE

## 2022-06-03 PROCEDURE — 77030039825 HC MSK NSL PAP SUPERNO2VA VYRM -B: Performed by: ANESTHESIOLOGY

## 2022-06-03 PROCEDURE — 74011250637 HC RX REV CODE- 250/637: Performed by: STUDENT IN AN ORGANIZED HEALTH CARE EDUCATION/TRAINING PROGRAM

## 2022-06-03 PROCEDURE — 87077 CULTURE AEROBIC IDENTIFY: CPT | Performed by: INTERNAL MEDICINE

## 2022-06-03 PROCEDURE — 0DB78ZX EXCISION OF STOMACH, PYLORUS, VIA NATURAL OR ARTIFICIAL OPENING ENDOSCOPIC, DIAGNOSTIC: ICD-10-PCS | Performed by: INTERNAL MEDICINE

## 2022-06-03 PROCEDURE — 82550 ASSAY OF CK (CPK): CPT

## 2022-06-03 DEVICE — WORKING LENGTH 235CM, WORKING CHANNEL 2.8MM
Type: IMPLANTABLE DEVICE | Site: DUODENUM | Status: FUNCTIONAL
Brand: RESOLUTION 360 CLIP

## 2022-06-03 RX ORDER — SODIUM CHLORIDE 9 MG/ML
75 INJECTION, SOLUTION INTRAVENOUS CONTINUOUS
Status: DISPENSED | OUTPATIENT
Start: 2022-06-03 | End: 2022-06-03

## 2022-06-03 RX ORDER — SODIUM CHLORIDE 0.9 % (FLUSH) 0.9 %
5-40 SYRINGE (ML) INJECTION EVERY 8 HOURS
Status: DISCONTINUED | OUTPATIENT
Start: 2022-06-03 | End: 2022-06-17 | Stop reason: HOSPADM

## 2022-06-03 RX ORDER — DIPHENHYDRAMINE HYDROCHLORIDE 50 MG/ML
12.5 INJECTION, SOLUTION INTRAMUSCULAR; INTRAVENOUS AS NEEDED
Status: CANCELLED | OUTPATIENT
Start: 2022-06-03 | End: 2022-06-03

## 2022-06-03 RX ORDER — SODIUM CHLORIDE 0.9 % (FLUSH) 0.9 %
5-40 SYRINGE (ML) INJECTION AS NEEDED
Status: DISCONTINUED | OUTPATIENT
Start: 2022-06-03 | End: 2022-06-17 | Stop reason: HOSPADM

## 2022-06-03 RX ORDER — SODIUM CHLORIDE 0.9 % (FLUSH) 0.9 %
5-40 SYRINGE (ML) INJECTION AS NEEDED
Status: CANCELLED | OUTPATIENT
Start: 2022-06-03

## 2022-06-03 RX ORDER — FENTANYL CITRATE 50 UG/ML
25 INJECTION, SOLUTION INTRAMUSCULAR; INTRAVENOUS
Status: CANCELLED | OUTPATIENT
Start: 2022-06-03

## 2022-06-03 RX ORDER — SODIUM CHLORIDE, SODIUM LACTATE, POTASSIUM CHLORIDE, CALCIUM CHLORIDE 600; 310; 30; 20 MG/100ML; MG/100ML; MG/100ML; MG/100ML
25 INJECTION, SOLUTION INTRAVENOUS CONTINUOUS
Status: CANCELLED | OUTPATIENT
Start: 2022-06-03

## 2022-06-03 RX ORDER — SUCRALFATE 1 G/1
1 TABLET ORAL
Status: DISPENSED | OUTPATIENT
Start: 2022-06-03 | End: 2022-06-17

## 2022-06-03 RX ORDER — SODIUM CHLORIDE, SODIUM LACTATE, POTASSIUM CHLORIDE, CALCIUM CHLORIDE 600; 310; 30; 20 MG/100ML; MG/100ML; MG/100ML; MG/100ML
INJECTION, SOLUTION INTRAVENOUS
Status: DISCONTINUED | OUTPATIENT
Start: 2022-06-03 | End: 2022-06-03 | Stop reason: HOSPADM

## 2022-06-03 RX ORDER — PROPOFOL 10 MG/ML
INJECTION, EMULSION INTRAVENOUS AS NEEDED
Status: DISCONTINUED | OUTPATIENT
Start: 2022-06-03 | End: 2022-06-03 | Stop reason: HOSPADM

## 2022-06-03 RX ORDER — HYDROMORPHONE HYDROCHLORIDE 1 MG/ML
0.2 INJECTION, SOLUTION INTRAMUSCULAR; INTRAVENOUS; SUBCUTANEOUS
Status: CANCELLED | OUTPATIENT
Start: 2022-06-03

## 2022-06-03 RX ORDER — SODIUM CHLORIDE 0.9 % (FLUSH) 0.9 %
5-40 SYRINGE (ML) INJECTION EVERY 8 HOURS
Status: CANCELLED | OUTPATIENT
Start: 2022-06-03

## 2022-06-03 RX ORDER — SUCRALFATE 1 G/10ML
1 SUSPENSION ORAL
Status: DISCONTINUED | OUTPATIENT
Start: 2022-06-03 | End: 2022-06-03 | Stop reason: CLARIF

## 2022-06-03 RX ADMIN — GABAPENTIN 100 MG: 100 CAPSULE ORAL at 21:46

## 2022-06-03 RX ADMIN — SODIUM CHLORIDE, PRESERVATIVE FREE 10 ML: 5 INJECTION INTRAVENOUS at 21:47

## 2022-06-03 RX ADMIN — SUCRALFATE 1 G: 1 TABLET ORAL at 13:40

## 2022-06-03 RX ADMIN — Medication: at 17:19

## 2022-06-03 RX ADMIN — SUCRALFATE 1 G: 1 TABLET ORAL at 17:17

## 2022-06-03 RX ADMIN — PIPERACILLIN AND TAZOBACTAM 3.38 G: 3; .375 INJECTION, POWDER, LYOPHILIZED, FOR SOLUTION INTRAVENOUS at 05:52

## 2022-06-03 RX ADMIN — SODIUM CHLORIDE, PRESERVATIVE FREE 10 ML: 5 INJECTION INTRAVENOUS at 05:52

## 2022-06-03 RX ADMIN — METOPROLOL TARTRATE 25 MG: 25 TABLET, FILM COATED ORAL at 09:32

## 2022-06-03 RX ADMIN — Medication: at 09:33

## 2022-06-03 RX ADMIN — PROPOFOL 80 MG: 10 INJECTION, EMULSION INTRAVENOUS at 12:21

## 2022-06-03 RX ADMIN — GABAPENTIN 100 MG: 100 CAPSULE ORAL at 09:32

## 2022-06-03 RX ADMIN — PIPERACILLIN AND TAZOBACTAM 3.38 G: 3; .375 INJECTION, POWDER, LYOPHILIZED, FOR SOLUTION INTRAVENOUS at 13:41

## 2022-06-03 RX ADMIN — AMIODARONE HYDROCHLORIDE 200 MG: 200 TABLET ORAL at 09:32

## 2022-06-03 RX ADMIN — SODIUM CHLORIDE, POTASSIUM CHLORIDE, SODIUM LACTATE AND CALCIUM CHLORIDE: 600; 310; 30; 20 INJECTION, SOLUTION INTRAVENOUS at 12:10

## 2022-06-03 RX ADMIN — SODIUM CHLORIDE 40 MG: 9 INJECTION INTRAMUSCULAR; INTRAVENOUS; SUBCUTANEOUS at 09:32

## 2022-06-03 RX ADMIN — PIPERACILLIN AND TAZOBACTAM 3.38 G: 3; .375 INJECTION, POWDER, LYOPHILIZED, FOR SOLUTION INTRAVENOUS at 21:47

## 2022-06-03 RX ADMIN — SODIUM CHLORIDE 40 MG: 9 INJECTION INTRAMUSCULAR; INTRAVENOUS; SUBCUTANEOUS at 21:47

## 2022-06-03 RX ADMIN — GABAPENTIN 100 MG: 100 CAPSULE ORAL at 17:18

## 2022-06-03 RX ADMIN — MICONAZOLE NITRATE: 20 CREAM TOPICAL at 21:46

## 2022-06-03 RX ADMIN — Medication 5 UNITS: at 17:15

## 2022-06-03 RX ADMIN — ACETAZOLAMIDE 500 MG: 500 CAPSULE, EXTENDED RELEASE ORAL at 21:46

## 2022-06-03 RX ADMIN — IPRATROPIUM BROMIDE AND ALBUTEROL SULFATE 3 ML: .5; 3 SOLUTION RESPIRATORY (INHALATION) at 07:42

## 2022-06-03 RX ADMIN — SODIUM CHLORIDE, PRESERVATIVE FREE 10 ML: 5 INJECTION INTRAVENOUS at 13:34

## 2022-06-03 RX ADMIN — PREDNISONE 20 MG: 20 TABLET ORAL at 09:32

## 2022-06-03 RX ADMIN — VANCOMYCIN HYDROCHLORIDE 1500 MG: 10 INJECTION, POWDER, LYOPHILIZED, FOR SOLUTION INTRAVENOUS at 10:49

## 2022-06-03 RX ADMIN — IPRATROPIUM BROMIDE AND ALBUTEROL SULFATE 3 ML: .5; 3 SOLUTION RESPIRATORY (INHALATION) at 21:25

## 2022-06-03 RX ADMIN — VANCOMYCIN HYDROCHLORIDE 1500 MG: 10 INJECTION, POWDER, LYOPHILIZED, FOR SOLUTION INTRAVENOUS at 00:14

## 2022-06-03 RX ADMIN — METOPROLOL TARTRATE 25 MG: 25 TABLET, FILM COATED ORAL at 17:17

## 2022-06-03 RX ADMIN — MICONAZOLE NITRATE: 20 CREAM TOPICAL at 09:34

## 2022-06-03 RX ADMIN — ACETAZOLAMIDE 500 MG: 500 CAPSULE, EXTENDED RELEASE ORAL at 09:32

## 2022-06-03 NOTE — PROGRESS NOTES
End of Shift Note    Bedside shift change report given to [address] [place] [address] (oncoming nurse) by Lazarus Huge, RN (offgoing nurse). Report included the following information SBAR    Shift worked:  7am-7pm       Shift summary and any significant changes:     EGD done      Concerns for physician to address:  none     Zone phone for oncoming shift:          Activity:  Activity Level: Bed Rest  Number times ambulated in hallways past shift: 0  Number of times OOB to chair past shift: 0    Cardiac:   Cardiac Monitoring: Yes      Cardiac Rhythm: Sinus Rhythm    Access:   Current line(s): PIV and PICC     Genitourinary:   Urinary status: external catheter    Respiratory:   O2 Device: Nasal cannula  Chronic home O2 use?: YES  Incentive spirometer at bedside: YES       GI:  Last Bowel Movement Date: 06/02/22  Current diet:  ADULT DIET Dysphagia - Soft & Bite Sized  Passing flatus: YES  Tolerating current diet: YES       Pain Management:   Patient states pain is manageable on current regimen: YES    Skin:  Gaston Score: 17  Interventions: turn team    Patient Safety:  Fall Score:  Total Score: 2  Interventions: bed/chair alarm       Length of Stay:  Expected LOS: 3d 14h  Actual LOS: Braydon Carlson RN

## 2022-06-03 NOTE — PROGRESS NOTES
Gastroenterology Progress Note  Toula Cooks, PA   for Dr. Baljinder Schmitt    6/3/2022    Admit Date: 5/23/2022    Subjective: Follow up for:  1) Worsening macrocytic anemia    2) Morbid obesity    3) Heme positive stool     4) dysphagia     Patient is on NPO in prep for possible EGD today. Reports no SOB today. Pain: Patient complains of abdominal pain no.       Bowel Movements: BM yesterday with some BRB per pt    Given one unit of blood yesterday with improvement in hgb from 7.3 to 8.3      Current Facility-Administered Medications   Medication Dose Route Frequency    0.9% sodium chloride infusion  75 mL/hr IntraVENous CONTINUOUS    sodium chloride (NS) flush 5-40 mL  5-40 mL IntraVENous Q8H    sodium chloride (NS) flush 5-40 mL  5-40 mL IntraVENous PRN    pantoprazole (PROTONIX) 40 mg in 0.9% sodium chloride 10 mL injection  40 mg IntraVENous Q12H    piperacillin-tazobactam (ZOSYN) 3.375 g in 0.9% sodium chloride (MBP/ADV) 100 mL MBP  3.375 g IntraVENous Q8H    predniSONE (DELTASONE) tablet 20 mg  20 mg Oral DAILY WITH BREAKFAST    0.9% sodium chloride infusion 250 mL  250 mL IntraVENous PRN    acetaZOLAMIDE SR (DIAMOX) capsule 500 mg  500 mg Oral Q12H    vancomycin (VANCOCIN) 1500 mg in  ml infusion  1,500 mg IntraVENous Q12H    albuterol-ipratropium (DUO-NEB) 2.5 MG-0.5 MG/3 ML  3 mL Nebulization BID    metoprolol tartrate (LOPRESSOR) tablet 25 mg  25 mg Oral BID    glucose chewable tablet 16 g  4 Tablet Oral PRN    dextrose 10% infusion 0-250 mL  0-250 mL IntraVENous PRN    glucagon (GLUCAGEN) injection 1 mg  1 mg IntraMUSCular PRN    insulin lispro (HUMALOG) injection   SubCUTAneous AC&HS    [Held by provider] furosemide (LASIX) tablet 40 mg  40 mg Oral DAILY    ammonium lactate (LAC-HYDRIN) 12 % lotion   Topical BID    miconazole (SECURA) 2 % extra thick cream   Topical BID    lidocaine 4 % patch 2 Patch  2 Patch TransDERmal Q24H    diphenhydrAMINE (BENADRYL) capsule 25 mg  25 mg Oral Q6H PRN    sodium chloride (NS) flush 5-40 mL  5-40 mL IntraVENous Q8H    sodium chloride (NS) flush 5-40 mL  5-40 mL IntraVENous PRN    acetaminophen (TYLENOL) tablet 650 mg  650 mg Oral Q6H PRN    Or    acetaminophen (TYLENOL) suppository 650 mg  650 mg Rectal Q6H PRN    polyethylene glycol (MIRALAX) packet 17 g  17 g Oral DAILY PRN    ondansetron (ZOFRAN ODT) tablet 4 mg  4 mg Oral Q8H PRN    Or    ondansetron (ZOFRAN) injection 4 mg  4 mg IntraVENous Q6H PRN    amiodarone (CORDARONE) tablet 200 mg  200 mg Oral DAILY    gabapentin (NEURONTIN) capsule 100 mg  100 mg Oral TID    [Held by provider] rivaroxaban (XARELTO) tablet 20 mg  20 mg Oral DAILY WITH BREAKFAST        Objective:     Blood pressure (!) 122/56, pulse 68, temperature 97.3 °F (36.3 °C), resp. rate 26, height 5' 3\" (1.6 m), weight 138.3 kg (304 lb 14.4 oz), SpO2 96 %. No intake/output data recorded. 06/01 1901 - 06/03 0700  In: 1411.3 [P.O.:480; I.V.:600]  Out: 56 [Urine:1150]    EXAM:  GEN: Morbidly obese WF, NAD  HEENT: NCAT  Heart: RRR  Lungs: Normal WOB, CTA  Abdomen: Soft, NT, ND, normal BS  Ext: BLE edema  Data Review    Recent Results (from the past 24 hour(s))   VITAMIN B12    Collection Time: 06/02/22 10:56 AM   Result Value Ref Range    Vitamin B12 491 193 - 986 pg/mL   GLUCOSE, POC    Collection Time: 06/02/22 11:26 AM   Result Value Ref Range    Glucose (POC) 190 (H) 65 - 117 mg/dL    Performed by Markell Olson PCT    RBC, ALLOCATE    Collection Time: 06/02/22  2:00 PM   Result Value Ref Range    HISTORY CHECKED?  Historical check performed    TYPE & SCREEN    Collection Time: 06/02/22  4:09 PM   Result Value Ref Range    Crossmatch Expiration 06/05/2022,2359     ABO/Rh(D) Tilly Shoulder POSITIVE     Antibody screen NEG     Unit number [id number]     Blood component type RC LR,1     Unit division 00     Status of unit TRANSFUSED     Crossmatch result Compatible    GLUCOSE, POC    Collection Time: 06/02/22  4:13 PM Result Value Ref Range    Glucose (POC) 279 (H) 65 - 117 mg/dL    Performed by Merle Wood PCT    OCCULT BLOOD, STOOL    Collection Time: 06/02/22  6:00 PM   Result Value Ref Range    Occult blood, stool Positive (A) NEG     GLUCOSE, POC    Collection Time: 06/02/22  9:21 PM   Result Value Ref Range    Glucose (POC) 217 (H) 65 - 117 mg/dL    Performed by Francois Mcgowan    CK    Collection Time: 06/03/22  3:57 AM   Result Value Ref Range    CK 12 (L) 26 - 192 U/L   CBC W/O DIFF    Collection Time: 06/03/22  3:57 AM   Result Value Ref Range    WBC 16.3 (H) 3.6 - 11.0 K/uL    RBC 2.77 (L) 3.80 - 5.20 M/uL    HGB 8.3 (L) 11.5 - 16.0 g/dL    HCT 29.1 (L) 35.0 - 47.0 %    .1 (H) 80.0 - 99.0 FL    MCH 30.0 26.0 - 34.0 PG    MCHC 28.5 (L) 30.0 - 36.5 g/dL    RDW 18.5 (H) 11.5 - 14.5 %    PLATELET 926 (L) 060 - 400 K/uL    MPV 12.6 8.9 - 12.9 FL    NRBC 0.2 (H) 0  WBC    ABSOLUTE NRBC 0.03 (H) 0.00 - 9.48 K/uL   METABOLIC PANEL, BASIC    Collection Time: 06/03/22  3:57 AM   Result Value Ref Range    Sodium 143 136 - 145 mmol/L    Potassium 3.7 3.5 - 5.1 mmol/L    Chloride 105 97 - 108 mmol/L    CO2 35 (H) 21 - 32 mmol/L    Anion gap 3 (L) 5 - 15 mmol/L    Glucose 105 (H) 65 - 100 mg/dL    BUN 19 6 - 20 MG/DL    Creatinine 0.47 (L) 0.55 - 1.02 MG/DL    BUN/Creatinine ratio 40 (H) 12 - 20      GFR est AA >60 >60 ml/min/1.73m2    GFR est non-AA >60 >60 ml/min/1.73m2    Calcium 8.2 (L) 8.5 - 10.1 MG/DL   GLUCOSE, POC    Collection Time: 06/03/22  7:14 AM   Result Value Ref Range    Glucose (POC) 89 65 - 117 mg/dL    Performed by Shauna Mondragon PCT      Recent Labs     06/03/22 0357 06/02/22 0108   WBC 16.3* 14.9*   HGB 8.3* 7.3*   HCT 29.1* 25.7*   * 133*     Recent Labs     06/03/22 0357 06/02/22 0108 06/01/22  0312    142 143   K 3.7 3.8 3.8    102 100   CO2 35* 37* 42*   BUN 19 21* 22*   CREA 0.47* 0.49* 0.45*   * 165* 111*   CA 8.2* 8.2* 8.3*     No results for input(s): ALT, AP, TBIL, TBILI, TP, ALB, GLOB, GGT, AML, LPSE in the last 72 hours. No lab exists for component: SGOT, GPT, AMYP, HLPSE  No results for input(s): INR, PTP, APTT, INREXT in the last 72 hours. No results for input(s): FE, TIBC, PSAT, FERR in the last 72 hours. Lab Results   Component Value Date/Time    Folate 2.4 (L) 05/15/2022 01:26 PM      No results for input(s): PH, PCO2, PO2 in the last 72 hours. Recent Labs     06/03/22  0357   CPK 12*     Lab Results   Component Value Date/Time    Cholesterol, total 127 07/26/2020 05:34 AM    HDL Cholesterol 41 07/26/2020 05:34 AM    LDL, calculated 55 07/26/2020 05:34 AM    Triglyceride 155 (H) 07/26/2020 05:34 AM    CHOL/HDL Ratio 3.1 07/26/2020 05:34 AM     Lab Results   Component Value Date/Time    Glucose (POC) 89 06/03/2022 07:14 AM    Glucose (POC) 217 (H) 06/02/2022 09:21 PM    Glucose (POC) 279 (H) 06/02/2022 04:13 PM    Glucose (POC) 190 (H) 06/02/2022 11:26 AM    Glucose (POC) 98 06/02/2022 08:17 AM     Lab Results   Component Value Date/Time    Color DARK YELLOW 05/24/2022 09:40 AM    Appearance TURBID (A) 05/24/2022 09:40 AM    Specific gravity 1.022 05/24/2022 09:40 AM    Specific gravity 1.010 05/03/2022 06:41 PM    pH (UA) 5.5 05/24/2022 09:40 AM    Protein 30 (A) 05/24/2022 09:40 AM    Glucose Negative 05/24/2022 09:40 AM    Ketone TRACE (A) 05/24/2022 09:40 AM    Bilirubin Negative 05/24/2022 09:40 AM    Urobilinogen 0.2 05/24/2022 09:40 AM    Nitrites Negative 05/24/2022 09:40 AM    Leukocyte Esterase TRACE (A) 05/24/2022 09:40 AM    Epithelial cells MODERATE (A) 05/24/2022 09:40 AM    Bacteria Negative 05/24/2022 09:40 AM    WBC 0-4 05/24/2022 09:40 AM    RBC 0-5 05/24/2022 09:40 AM           Assessment:     Active Problems:    Acute respiratory failure ([address] (11/18/2020)      Anemia (6/2/2022)        Plan:   Patient with improvement in hgb from 7.3>8.3 after first unit of blood. Stool was found to be heme positive.   Awaiting iron studies, B12 normal.  Plan was for possible EGD today pending pulmonary clearance for anesthesia. Patient in agreement with proceeding and all questions answered. Continue on BID PPI and follow daily hgb, seems to do better when maintained >8. Reeda Kocher, PA    06/03/22  8:33 AM  [address] [facility], [address] [place] [address].. [place] [phone] [address] [place]: [phone]    I have examined the patient. I have reviewed the chart and agree with the documentation recorded by the ALE[facility], including the assessment, treatment plan, and disposition. Patient seen and examined by me. I personally performed all components of the history, physical, and medical decision making and agree with the assessment and plan with minor modifications as noted. Exam:    Physical exam:  General:AAO x 3, morbidly obese, lying in bed, mild shortness of breath, gives a good history. HEENT:  EOMI,   Chest:  CTA,Heart: S1, S2, RRR  GI: Soft, NT, ND + bowel sounds  Extremities: No edema     Data Review:  reviewed             Impression:   Macrocytic anemia  Ling term anticoagulant use  Respiratory failure  Obesity    Plan:    Cleared by pulmonary medicine for EGD but anesthesia wants case done in the OR. We will proceed with the EGD today. She is NPO,  Xarelto is on hold,  Hgb is better. I discussed with the patient the objectives, risks, consequences and alternatives to upper endoscopy with possible biopsy and treatment of bleeding. Petey Harris MD  [facility](A)

## 2022-06-03 NOTE — PROCEDURES
[place] Office: [phone]      Esophagogastroduodenoscopy Procedure Note      Ashley Pena  1960  [id number]    Indication:  Anemia,dysphagia     : Iliana Hanson MD    Referring Provider:  Unknown, Provider, MD    Sedation:  MAC anesthesia Propofol    Procedure Details:  After detailed informed consent was obtained for the procedure, with all risks and benefits of procedure explained the patient was taken to the endoscopy suite and placed in the left lateral decubitus position. Following sequential administration of sedation as per above, the endoscope was inserted into the mouth and advanced under direct vision to second portion of the duodenum. A careful inspection was made as the gastroscope was withdrawn, including a retroflexed view of the proximal stomach; findings and interventions are described below. Findings:     Esophagus: The esophageal mucosa in the proximal, mid and distal esophagus is normal.   A few prominent distal veinous blebs are noted. The squamo-columnar junction is at 40 cm where the Z-line was noted. A small possible hiatal hernia is noted. TTS CRE 15 mm balloon dilation was performed    Stomach:   Multiple,small, shallow antral ulcers/erosions are noted with no active bleeding. Biopsies taken for VANESSA and for pathology. The gastric mucosa has erythema in the body. The fundus was found to be normal with no lesions noted on retroflexion. Endoscopic grading of gastroesophageal flap valve (GEFV)/Hill stgstrstastdstest:st st1st The angularis is normal    Duodenum:   Edema and erosions noted at D1/D2 junction. Biopsies taken which resulted in oozing. A single  Resolution Hemoclip was applied with control of oozing. The bulb  is normal in appearance.    The duodenal folds are normal.     Therapies:    Esophageal dilation with 15 mm sized balloon  biopsy of stomach body, antrum, and for VANESSA testing  biopsy of duodenal second portion  endoclip was placed as above    Specimen:  Specimens were collected as described and send to the laboratory. Complications:   None were encountered during the procedure. EBL:  minimal.          Recommendations:     -Acid suppression with a proton pump inhibitor., PO carafate added.  -Await pathology. ,   -Await VANESSA test result and treat for Helicobacter pylori if positive. ,   -Start Xarelto in 72 hrs. Paz Rizzo MD  6/3/2022  12:33 PM

## 2022-06-03 NOTE — PERIOP NOTES
TRANSFER - IN REPORT:    Verbal report received from [facility], [facility] Cost  being received from [phone] for ordered procedure      Report consisted of patients Situation, Background, Assessment and   Recommendations(SBAR). Information from the following report(s) SBAR, Kardex, Intake/Output, MAR, Recent Results and Cardiac Rhythm NSR  to SB was reviewed with the receiving nurse. Opportunity for questions and clarification was provided. Assessment completed upon patients arrival to unit and care assumed.

## 2022-06-03 NOTE — ANESTHESIA POSTPROCEDURE EVALUATION
Procedure(s):  ESOPHAGOGASTRODUODENOSCOPY (EGD). MAC    Anesthesia Post Evaluation        Patient location during evaluation: PACU  Note status: Adequate. Level of consciousness: responsive to verbal stimuli and sleepy but conscious  Pain management: satisfactory to patient  Airway patency: patent  Anesthetic complications: no  Cardiovascular status: acceptable  Respiratory status: acceptable  Hydration status: acceptable  Comments: +Post-Anesthesia Evaluation and Assessment    Patient: Bhargav Oneal MRN: [id number]  SSN: [id number]   YOB: 1960  Age: 57 y.o. Sex: female      Cardiovascular Function/Vital Signs    BP (!) 114/43   Pulse 68   Temp 36.8 °C (98.2 °F)   Resp 23   Ht 5' 3\" (1.6 m)   Wt 138.3 kg (304 lb 14.4 oz)   SpO2 99%   BMI 54.01 kg/m²     Patient is status post Procedure(s):  ESOPHAGOGASTRODUODENOSCOPY (EGD). Nausea/Vomiting: Controlled. Postoperative hydration reviewed and adequate. Pain:  Pain Scale 1: Numeric (0 - 10) (06/03/22 1135)  Pain Intensity 1: 0 (06/03/22 1135)   Managed. Neurological Status: At baseline. Mental Status and Level of Consciousness: Arousable. Pulmonary Status:   O2 Device: Other (comment) (Supernova) (06/03/22 1238)   Adequate oxygenation and airway patent. Complications related to anesthesia: None    Post-anesthesia assessment completed. No concerns. Signed By: Rigoberto Tipton MD    6/3/2022  Post anesthesia nausea and vomiting:  controlled      INITIAL Post-op Vital signs:   Vitals Value Taken Time   /40 06/03/22 1240   Temp 36.8 °C (98.2 °F) 06/03/22 1238   Pulse 68 06/03/22 1242   Resp 31 06/03/22 1242   SpO2 99 % 06/03/22 1242   Vitals shown include unvalidated device data.

## 2022-06-03 NOTE — PERIOP NOTES
Handoff Report from Operating Room to PACU    Report received from MISTI White RN and Veronica Gudino MD regarding Nash Burnette. Surgeon(s):  Noni Pham MD  And Procedure(s) (LRB):  ESOPHAGOGASTRODUODENOSCOPY (EGD) (N/A)  confirmed   with allergies discussed. Anesthesia type, drugs, patient history, complications, estimated blood loss, vital signs, intake and output, and lines and temperature were reviewed. TRANSFER - OUT REPORT:    Verbal report given to Ti RN(name) on Nash Burnette  being transferred to [facility](unit) for routine post - op       Report consisted of patients Situation, Background, Assessment and   Recommendations(SBAR). Information from the following report(s) SBAR, OR Summary, MAR, Recent Results and Cardiac Rhythm NSR was reviewed with the receiving nurse. Opportunity for questions and clarification was provided.       Patient transported with:   Monitor  O2 @ 4 liters  Registered Nurse

## 2022-06-03 NOTE — PROGRESS NOTES
Pharmacy Antimicrobial Kinetic Dosing    Indication for Antimicrobials: Bone & Joint Infection    Current Regimen of Each Antimicrobial:  Vancomycin Pharmacy to Dose (Start Date ; Day # 1)  Zosyn 3.375 g IV q8h     Previous Antimicrobial Therapy:  Daptomycin     Goal Level: AUC: 400-600 mg/hr/Liter/day    Date Dose & Interval Measured (mcg/mL) Predicted AUC/SUDHA   6/3/22 1500 mg Q12H 37.1, 45.8 (random) > 600                 Date & time of next level: pending    Dosing calculator used: BumpTop calculator    Significant Positive Cultures:    blood: ng, final    Conditions for Dosing Consideration: None    Labs:  Recent Labs     22  0357 22  0108 22  031   CREA 0.47* 0.49* 0.45*   BUN 19 21* 22*     Recent Labs     22  0357 228 22  0312   WBC 16.3* 14.9* 13.6*     Temp (24hrs), Av.9 °F (36.6 °C), Min:97.2 °F (36.2 °C), Max:99.1 °F (37.3 °C)      Creatinine Clearance (mL/min):   CrCl (Ideal Body Weight): 68.9   If actual weight < IBW: CrCl (Actual Body Weight) 181.9    Impression/Plan:   Clearance is ~ half of anticipated for calculated creatinine clearance. Vancomycin 1250 mg Q24H has anticipate trough of 13-16 mcg/ml with an AUC ~ 500. Level expected to be 15 mcg/ml on  6:00. Will order vancomycin level with am labs on . Restart vancomycin once level is therapeutic. Antimicrobial stop date:      Pharmacy will follow daily and adjust medications as appropriate for renal function and/or serum levels.     Thank you,  Braulio Mccann, [facility]

## 2022-06-03 NOTE — PROGRESS NOTES
1900 Bedside and Verbal shift change report given to [address] (oncoming nurse) by Sejal Ovalle RN  (offgoing nurse). Report included the following information SBAR, Kardex, Intake/Output, MAR, Recent Results and Cardiac Rhythm NSR.     1943 Critical vanc of 45.8 reported to pharmacist.    End of Shift Note    Bedside shift change report given to Faby Rodriguez RN (oncoming nurse) by Harsha Reinoso RN (offgoing nurse). Report included the following information SBAR, Kardex, Intake/Output, MAR, Recent Results and Cardiac Rhythm NSR    Shift worked:  1900 - 0700     Shift summary and any significant changes:    See above. Home CPAP worn overnight; 4-5L NC while awake. CHG bath and wound care completed. Concerns for physician to address:      Zone phone for oncoming shift:          Activity:  Activity Level: Bed Rest  Number times ambulated in hallways past shift: 0  Number of times OOB to chair past shift: 0    Cardiac:   Cardiac Monitoring: Yes      Cardiac Rhythm: Sinus Rhythm    Access:   Current line(s): PIV and PICC     Genitourinary:   Urinary status: voiding and external catheter    Respiratory:   O2 Device: CPAP mask  Chronic home O2 use?: YES  Incentive spirometer at bedside: NO       GI:  Last Bowel Movement Date: 06/02/22  Current diet:  ADULT DIET Dysphagia - Soft & Bite Sized  Passing flatus: YES  Tolerating current diet: YES       Pain Management:   Patient states pain is manageable on current regimen: YES    Skin:  Gaston Score: 15  Interventions: turn team, speciality bed, float heels, foam dressing, internal/external urinary devices and nutritional support     Patient Safety:  Fall Score:  Total Score: 2  Interventions: bed/chair alarm, gripper socks and stay with me (per policy)       Length of Stay:  Expected LOS: 3d 14h  Actual LOS: [address], RN

## 2022-06-03 NOTE — PROGRESS NOTES
Problem: Pressure Injury - Risk of  Goal: *Prevention of pressure injury  Description: Document Gaston Scale and appropriate interventions in the flowsheet. Outcome: Progressing Towards Goal  Note: Pressure Injury Interventions:  Sensory Interventions: Assess changes in LOC,Assess need for specialty bed    Moisture Interventions: Absorbent underpads,Apply protective barrier, creams and emollients    Activity Interventions: Assess need for specialty bed,Chair cushion    Mobility Interventions: Assess need for specialty bed,Float heels    Nutrition Interventions: Document food/fluid/supplement intake    Friction and Shear Interventions: Apply protective barrier, creams and emollients,Feet elevated on foot rest                Problem: Falls - Risk of  Goal: *Absence of Falls  Description: Document Anna Fall Risk and appropriate interventions in the flowsheet.   Outcome: Progressing Towards Goal  Note: Fall Risk Interventions:            Medication Interventions: Assess postural VS orthostatic hypotension,Bed/chair exit alarm    Elimination Interventions: Bed/chair exit alarm,Call light in reach

## 2022-06-03 NOTE — CONSULTS
Pulmonary, Critical Care, and Sleep Medicine    Patient Consult    Name: Chinmay Jarvis MRN: [id number]   : 1960 Hospital: [facility]   Date: 6/3/2022        IMPRESSION:   · Agree to proceed with Endoscopy. Pt has increased risk of complications due to underlying conditions, but seems optimized with no modifiable risk at this point. · Acute on chronic respiratory failure-seems to be at her baseline. ON 4L NC.   · JOSLYN/OHS-compliant with CPAP  · Morbid Obesity. · Discussed with DR. Dhara Martell and nurse. RECOMMENDATIONS:   · Agree to proceed with procedure as scheduled. · May take a while longer to recover after anesthesia. · O2 during the day, on baseline of 4L NC.   · NIV home settings at night  · Jet nebs  · Will see again as needed. Please reconsult if needed. Thanks. Subjective:   PT reconsulted for risk assessment for endoscopy. Pt appears to be optimized. Has been on baseline 4L NC. Using NIPPV at night. Had mild cough last pm.   NO chest pain, no back pain, no leg pain.        Past Medical History:   Diagnosis Date    Acute on chronic respiratory failure with hypoxia and hypercapnia (HCC) 2020    Atrial fibrillation ([address] 2020    CH[facility]congestive heart failure) ([facility])     Diabetes ([address]     Hypertension     Ill-defined condition     high cholesterol    Ill-defined condition     tachycardia    JOSLYN (obstructive sleep apnea) 2020    Other and unspecified symptoms and signs involving general sensations and perceptions     ruptured disc    S/P cardiac cath 2020    11/3/2020 nonobstructive disease      Past Surgical History:   Procedure Laterality Date    NC COMPRE ELECTROPHYSIOL XM W/LEFT ATRIAL PACNG/REC N/A 2020    Lt Atrial Pace & Record During Ep Study performed by Mallory Patterson MD at [facility] CARDIAC [facility]    NC COMPRE EP EVAL ABLTJ 3D MAPG TX SVT N/A 2020    ABLATION A-FLUTTER performed by Colin Ahmadi MD LETA at [facility] CARDIAC CATH LAB    MT ICAR CATHETER ABLATION ARRHYTHMIA ADD ON N/A 11/23/2020    Ablation Svt/Vt Add On performed by Veronique Sanchez MD at [facility] CARDIAC CATH LAB    MT INTRACARDIAC ELECTROPHYSIOLOGIC 3D MAPPING N/A 11/23/2020    Ep 3d Mapping performed by Veronique Sanchez MD at [facility] CARDIAC CATH LAB    MT STIM/PACING HEART POST IV DRUG INFU N/A 11/23/2020    Drug Stimulation performed by Veronique Sanchez MD at [facility] CARDIAC CATH LAB      Prior to Admission medications    Medication Sig Start Date End Date Taking? Authorizing Provider   rivaroxaban (XARELTO) 20 mg tab tablet Take 1 Tablet by mouth daily for 30 days. 5/18/22 6/17/22  Selena Gonzalez MD   L.acid,para-B. bifidum-S.therm (RISAQUAD) 8 billion cell cap cap Take 1 Capsule by mouth daily for 36 days. 5/18/22 6/23/22  Selena Gonzalez MD   atorvastatin (LIPITOR) 10 mg tablet Take 1 Tablet by mouth nightly. START AFTER COMPLETING ANTIBIOTICS TREATMENT ON 6/17 6/17/22   Selena Gonzalez MD   amiodarone (CORDARONE) 200 mg tablet Take 1 Tablet by mouth daily for 30 days. 5/18/22 6/17/22  Selena Gonzalez MD   cefepime 2 gram 2 g IVPB 2 g by IntraVENous route every eight (8) hours for 30 days. 5/17/22 6/16/22  Selena Gonzalez MD   DAPTOmycin (CUBICIN) IVPB 8 mg/kg daily 5/17/22 6/16/22  Mt Blake MD   folic acid (FOLVITE) 1 mg tablet Take 1 Tablet by mouth daily for 30 days. 5/18/22 6/17/22  Selena Gonzalez MD   gabapentin (NEURONTIN) 100 mg capsule Take 1 Capsule by mouth three (3) times daily for 30 days. Max Daily Amount: 300 mg. 5/17/22 6/16/22  Selena Gonzalez MD   albuterol-ipratropium (DUO-NEB) 2.5 mg-0.5 mg/3 ml nebu 3 mL by Nebulization route every four (4) hours as needed for Wheezing, Shortness of Breath or Respiratory Distress. 12/8/20   Loy Hanson MD   acetaminophen (TYLENOL) 325 mg tablet Take 650 mg by mouth every six (6) hours as needed for Pain.     Provider, Chun furosemide (LASIX) 40 mg tablet Take 1 Tab by mouth daily. Patient taking differently: Take 20 mg by mouth daily. Takes prn when fluid overloaded 8/7/20   Jennifer Hanson MD   metoprolol (LOPRESSOR) 25 mg tablet Take 25 mg by mouth two (2) times a day. Rachael, MD Russ     Allergies   Allergen Reactions    Cefepime Rash    Cefazolin Rash    Other Plant, Animal, Environmental Rash     Ivory soap caused rash on hands. Social History     Tobacco Use    Smoking status: Former Smoker    Smokeless tobacco: Never Used   Substance Use Topics    Alcohol use: No      No family history on file.      Current Facility-Administered Medications   Medication Dose Route Frequency    0.9% sodium chloride infusion  75 mL/hr IntraVENous CONTINUOUS    sodium chloride (NS) flush 5-40 mL  5-40 mL IntraVENous Q8H    pantoprazole (PROTONIX) 40 mg in 0.9% sodium chloride 10 mL injection  40 mg IntraVENous Q12H    piperacillin-tazobactam (ZOSYN) 3.375 g in 0.9% sodium chloride (MBP/ADV) 100 mL MBP  3.375 g IntraVENous Q8H    predniSONE (DELTASONE) tablet 20 mg  20 mg Oral DAILY WITH BREAKFAST    acetaZOLAMIDE SR (DIAMOX) capsule 500 mg  500 mg Oral Q12H    vancomycin (VANCOCIN) 1500 mg in  ml infusion  1,500 mg IntraVENous Q12H    albuterol-ipratropium (DUO-NEB) 2.5 MG-0.5 MG/3 ML  3 mL Nebulization BID    metoprolol tartrate (LOPRESSOR) tablet 25 mg  25 mg Oral BID    insulin lispro (HUMALOG) injection   SubCUTAneous AC&HS    [Held by provider] furosemide (LASIX) tablet 40 mg  40 mg Oral DAILY    ammonium lactate (LAC-HYDRIN) 12 % lotion   Topical BID    miconazole (SECURA) 2 % extra thick cream   Topical BID    lidocaine 4 % patch 2 Patch  2 Patch TransDERmal Q24H    sodium chloride (NS) flush 5-40 mL  5-40 mL IntraVENous Q8H    amiodarone (CORDARONE) tablet 200 mg  200 mg Oral DAILY    gabapentin (NEURONTIN) capsule 100 mg  100 mg Oral TID    [Held by provider] rivaroxaban (XARELTO) tablet 20 mg  20 mg Oral DAILY WITH BREAKFAST       Review of Systems:  A comprehensive review of systems was negative except for: Respiratory: positive for dyspnea on exertion    Objective:   Vital Signs:    Visit Vitals  BP (!) 122/56 (BP 1 Location: Right lower arm, BP Patient Position: At rest)   Pulse 68   Temp 97.3 °F (36.3 °C)   Resp 26   Ht 5' 3\" (1.6 m)   Wt 138.3 kg (304 lb 14.4 oz)   SpO2 96%   BMI 54.01 kg/m²       O2 Device: Nasal cannula   O2 Flow Rate (L/min): 4 l/min   Temp (24hrs), Av.8 °F (36.6 °C), Min:97.3 °F (36.3 °C), Max:98.2 °F (36.8 °C)       Intake/Output:   Last shift:      No intake/output data recorded. Last 3 shifts:  1901 -  0700  In: 1411.3 [P.O.:480; I.V.:600]  Out: 1150 [Urine:1150]    Intake/Output Summary (Last 24 hours) at 6/3/2022 0859  Last data filed at 6/3/2022 0356  Gross per 24 hour   Intake 331.3 ml   Output 550 ml   Net -218.7 ml      Physical Exam:   General:  Alert, cooperative, no distress, appears stated age. Conversant, on 4L NC pox of 95%   Head:  Normocephalic, without obvious abnormality, atraumatic. Eyes:  Conjunctivae/corneas clear. PERRL, EOMs intact. Nose: Nares normal. Septum midline. Mucosa normal. No drainage or sinus tenderness. Throat: Lips, mucosa, and tongue normal. Teeth and gums normal.   Neck: Supple, symmetrical, trachea midline, no adenopathy, thyroid: no enlargment/tenderness/nodules, no carotid bruit and no JVD. Back:   Symmetric, no curvature. ROM normal.   Lungs:   Clear to auscultation bilaterally. Good air movement. Chest wall:  No tenderness or deformity. Heart:  Regular rate and rhythm, S1, S2 normal, no murmur, click, rub or gallop. Abdomen:   Soft, non-tender. Bowel sounds normal. No masses,  No organomegaly. Obese. Extremities: Extremities normal, atraumatic, no cyanosis, ++ edema. Pulses: 2+ and symmetric all extremities.    Skin: Skin color, texture, turgor normal. No rashes or lesions   Lymph nodes: Cervical, supraclavicular, and axillary nodes normal.   Neurologic: Grossly nonfocal, motor intact. Data review:     Recent Results (from the past 24 hour(s))   VITAMIN B12    Collection Time: 06/02/22 10:56 AM   Result Value Ref Range    Vitamin B12 491 193 - 986 pg/mL   GLUCOSE, POC    Collection Time: 06/02/22 11:26 AM   Result Value Ref Range    Glucose (POC) 190 (H) 65 - 117 mg/dL    Performed by Read Stalls PCT    RBC, ALLOCATE    Collection Time: 06/02/22  2:00 PM   Result Value Ref Range    HISTORY CHECKED?  Historical check performed    TYPE & SCREEN    Collection Time: 06/02/22  4:09 PM   Result Value Ref Range    Crossmatch Expiration 06/05/2022,2359     ABO/Rh(D) Jhonny Gimenez POSITIVE     Antibody screen NEG     Unit number [id number]     Blood component type RC LR,1     Unit division 00     Status of unit TRANSFUSED     Crossmatch result Compatible    GLUCOSE, POC    Collection Time: 06/02/22  4:13 PM   Result Value Ref Range    Glucose (POC) 279 (H) 65 - 117 mg/dL    Performed by Read Stalls PCT    OCCULT BLOOD, STOOL    Collection Time: 06/02/22  6:00 PM   Result Value Ref Range    Occult blood, stool Positive (A) NEG     GLUCOSE, POC    Collection Time: 06/02/22  9:21 PM   Result Value Ref Range    Glucose (POC) 217 (H) 65 - 117 mg/dL    Performed by [facility]    CK    Collection Time: 06/03/22  3:57 AM   Result Value Ref Range    CK 12 (L) 26 - 192 U/L   FOLATE    Collection Time: 06/03/22  3:57 AM   Result Value Ref Range    Folate 5.9 5.0 - 21.0 ng/mL   FERRITIN    Collection Time: 06/03/22  3:57 AM   Result Value Ref Range    Ferritin 93 26 - 388 NG/ML   IRON PROFILE    Collection Time: 06/03/22  3:57 AM   Result Value Ref Range    Iron 84 35 - 150 ug/dL    TIBC 256 250 - 450 ug/dL    Iron % saturation 33 20 - 50 %   CBC W/O DIFF    Collection Time: 06/03/22  3:57 AM   Result Value Ref Range    WBC 16.3 (H) 3.6 - 11.0 K/uL    RBC 2.77 (L) 3.80 - 5.20 M/uL    HGB 8.3 (L) 11.5 - 16.0 g/dL    HCT 29.1 (L) 35.0 - 47.0 %    .1 (H) 80.0 - 99.0 FL    MCH 30.0 26.0 - 34.0 PG    MCHC 28.5 (L) 30.0 - 36.5 g/dL    RDW 18.5 (H) 11.5 - 14.5 %    PLATELET 945 (L) 280 - 400 K/uL    MPV 12.6 8.9 - 12.9 FL    NRBC 0.2 (H) 0  WBC    ABSOLUTE NRBC 0.03 (H) 0.00 - 7.51 K/uL   METABOLIC PANEL, BASIC    Collection Time: 06/03/22  3:57 AM   Result Value Ref Range    Sodium 143 136 - 145 mmol/L    Potassium 3.7 3.5 - 5.1 mmol/L    Chloride 105 97 - 108 mmol/L    CO2 35 (H) 21 - 32 mmol/L    Anion gap 3 (L) 5 - 15 mmol/L    Glucose 105 (H) 65 - 100 mg/dL    BUN 19 6 - 20 MG/DL    Creatinine 0.47 (L) 0.55 - 1.02 MG/DL    BUN/Creatinine ratio 40 (H) 12 - 20      GFR est AA >60 >60 ml/min/1.73m2    GFR est non-AA >60 >60 ml/min/1.73m2    Calcium 8.2 (L) 8.5 - 10.1 MG/DL   GLUCOSE, POC    Collection Time: 06/03/22  7:14 AM   Result Value Ref Range    Glucose (POC) 89 65 - 117 mg/dL    Performed by [place] PCT        Imaging:  I have personally reviewed the patients radiographs and have reviewed the reports:  CXR reviewed  COMPARISON: 5/23/2022       INDICATION: short of breath     FINDINGS:  Lung volumes remain low. Bilateral nonspecific airspace disease/edema is  unchanged. Heart is borderline large. Left arm PICC line is stable. There is no  pneumothorax or midline shift.     IMPRESSION  Stable disease.         Marilia Linton MD

## 2022-06-03 NOTE — PROGRESS NOTES
1900 Bedside and Verbal shift change report given to [address] (oncoming nurse) by Geovanna Rhodes RN (offgoing nurse). Report included the following information SBAR, Kardex, Intake/Output, MAR, Recent Results and Cardiac Rhythm NSR/ST. End of Shift Note    Bedside shift change report given to MARIE Jaime (oncoming nurse) by Elisa Short RN (offgoing nurse). Report included the following information SBAR, Kardex, Intake/Output, MAR, Recent Results and Cardiac Rhythm NSR    Shift worked:  1900 - 0700     Shift summary and any significant changes:    1 unit PRBC's given; HGB 8.3. NPO for EGD today. CHG bath and wound care completed. Concerns for physician to address:      Zone phone for oncoming shift:         Activity:  Activity Level: Bed Rest  Number times ambulated in hallways past shift: 0  Number of times OOB to chair past shift: 0    Cardiac:   Cardiac Monitoring: Yes      Cardiac Rhythm: Sinus Rhythm    Access:   Current line(s): PIV and PICC     Genitourinary:   Urinary status: voiding and external catheter    Respiratory:   O2 Device: Nasal cannula  Chronic home O2 use?: YES  Incentive spirometer at bedside: NO       GI:  Last Bowel Movement Date: 06/01/22  Current diet:  DIET NPO  Passing flatus: YES  Tolerating current diet: YES       Pain Management:   Patient states pain is manageable on current regimen: YES    Skin:  Gaston Score: 15  Interventions: speciality bed, float heels, foam dressing, PT/OT consult, limit briefs, internal/external urinary devices and nutritional support     Patient Safety:  Fall Score:  Total Score: 2  Interventions: bed/chair alarm, gripper socks, pt to call before getting OOB and stay with me (per policy)       Length of Stay:  Expected LOS: 3d 14h  Actual LOS: [id number] Floyd Morrell, RN

## 2022-06-03 NOTE — ANESTHESIA PREPROCEDURE EVALUATION
Relevant Problems   RESPIRATORY SYSTEM   (+) COPD exacerbation (HCC)   (+) JOSLYN (obstructive sleep apnea)   (+) SOB (shortness of breath)      CARDIOVASCULAR   (+) A-fib (HCC)   (+) Atrial flutter with rapid ventricular response ([facility])   (+) CHF exacerbation (HCC)   (+) Essential hypertension      ENDOCRINE   (+) Morbid obesity (HCC)      HEMATOLOGY   (+) Anemia   (+) Hip osteomyelitis (HCC)       Anesthetic History   No history of anesthetic complications            Review of Systems / Medical History  Patient summary reviewed, nursing notes reviewed and pertinent labs reviewed    Pulmonary    COPD    Sleep apnea: CPAP  Shortness of breath      Comments: Former smoker  Acute on chronic respiratory failure - resolved  Dependence on supplemental O2 - 4L NC   Neuro/Psych             Comments: Sciatica Cardiovascular    Hypertension      CHF  Dysrhythmias : atrial fibrillation and atrial flutter  Hyperlipidemia    Exercise tolerance: <4 METS  Comments: S/P A-flutter ablation (11/23/20)    Patient taking Xarelto - held    ECG (5/23/22): Normal sinus rhythm   Inferior infarct , age undetermined    TTE (4/22/22):   Left Ventricle: Normal left ventricular systolic function with a visually estimated EF of 60 - 65%. Left ventricle size is normal. Normal wall thickness. Normal wall motion. Normal diastolic function.   Technical qualifiers: Echo study was technically difficult with poor endocardial visualization and technically difficult due to patient's body habitus. Cath (11/3/20):  Diagnostic:  Dominance: Right  Left Main  Dist LM lesion, 30% stenosed. Left Anterior Descending  The vessel was visualized by angiography. The vessel is angiographically normal.  First Septal Branch  1st Sept lesion, 50% stenosed. Left Circumflex  The vessel was visualized by angiography. The vessel is angiographically normal.  Right Coronary Artery  The vessel was visualized by angiography.  The vessel is angiographically normal.  Intervention:  No interventions have been documented. GI/Hepatic/Renal               Comments: Dysphagia  Heme positive stool Endo/Other    Diabetes: type 2    Morbid obesity, arthritis and anemia (Hgb = 8.3 on 6/3/22)    Comments:  Thrombocytopenia (Platelets = 225Q on 4/9/32)    Metabolic alkalosis Other Findings   Comments: Left displaced hip fracture  Left hip osteomyelitis/septic arthritis  Bilateral buttock and sacral wounds         Physical Exam    Airway  Mallampati: II  TM Distance: 4 - 6 cm  Neck ROM: normal range of motion, short neck   Mouth opening: Normal     Cardiovascular    Rhythm: irregular           Dental    Dentition: Edentulous     Pulmonary  Breath sounds clear to auscultation               Abdominal  GI exam deferred       Other Findings            Anesthetic Plan    ASA: 3  Anesthesia type: MAC          Induction: Intravenous  Anesthetic plan and risks discussed with: Patient      Peter

## 2022-06-03 NOTE — PROGRESS NOTES
GI Progress note:    See EGD note and recommendations,please. Of note colonoscopy, if needed/pt agrees, will be very difficult to accomplish 2/2 pt factors (High ASA score patient, morbid obesity, prep issue as she cannot 'move' and lies on her back all day long, case has to be in the OR only). Call partners to see on request this weekend.     SMA Steven MD

## 2022-06-03 NOTE — PROGRESS NOTES
Hospitalist Progress Note    NAME: Juana Ayala   :  1960   MRN:  [id number]       Assessment / Plan:  Acute on chronic hypoxic/hypercapnic respiratory failure-resolved  Left-sided pleural effusion thoracentesis  JOSLYN/OHS on trilogy  COPD on O2    Clinically symptoms improved  Tapering oral steroids, on 20 mg daily for 3 days and stop  Continue trilogy device as per pulmonary. Discussed with  and the work is ongoing to find the Trelegy after discharge, input is appreciated  Seen by pulmonary continue current treatment  On 3 liters of oxygen which is baseline    Acute on chronic anemia:  Hb dropping gradually, 7.3 yesterday, s/p 1 unit, improved to 8.3 today  Stool occult positive  Protonix 40 q12 hours  Holding xarelto  Cleared by pulmonary for Endoscopy, EGD today     Left hip fracture displaced  age undetermined  Left hip osteomyelitis/septic arthritis   Bilateral buttock and sacral wounds      -[facility] evaluated no surgical intervention planned  -S/p  Aspiration?/Bx by IR, culture negative. -ID recommends daptomycin and cefepime on discharge last admit.  End date . Jacinto Aschoff go to SNF with Daptomycin, switched to vancomycin today, ID agrees with this      Metabolic alkalosis  Improving, continue with diamox  Holding lasix for now    Chronic diastolic heart failure/PAF:   Continue  amiodarone . Lasix held as Met alkalosis, then it was resumed again on May 30  Will hold again. Hold xarelto    Steroid induced hyperglycemia-A1c level 5.4 on , ISS    HTN controlled.  Cont' PTA meds. Morbid obesity/Impaired mobility/functional quadriplegia-supportive care  Drug rash- suspected from cefepime now resolved.     Hypokalemia: Resolved     Non ambulatory state: Consult PT/OT     ADFC  DVT PRx on Xarelto  NOK  TOM:      Subjective:     Chief Complaint / Reason for Physician Visit  She remains on 3 liters oxygen  Mentions having non bloody bm last night.     Review of Systems:  Symptom Y/N Comments  Symptom Y/N Comments   Fever/Chills    Chest Pain     Poor Appetite    Edema     Cough    Abdominal Pain     Sputum    Joint Pain     SOB/CHOPRA    Pruritis/Rash     Nausea/vomit    Tolerating PT/OT     Diarrhea    Tolerating Diet     Constipation    Other       Could NOT obtain due to:      Objective:     VITALS:   Last 24hrs VS reviewed since prior progress note. Most recent are:  Patient Vitals for the past 24 hrs:   Temp Pulse Resp BP SpO2   06/03/22 1309 97.2 °F (36.2 °C) 69 30 (!) 122/42 95 %   06/03/22 1250 -- 69 28 (!) 118/44 91 %   06/03/22 1245 -- 69 28 (!) 119/46 94 %   06/03/22 1240 -- 68 27 (!) 109/40 99 %   06/03/22 1238 98.2 °F (36.8 °C) 68 23 (!) 114/43 99 %   06/03/22 1135 98.3 °F (36.8 °C) 67 25 (!) 116/52 98 %   06/03/22 0803 97.3 °F (36.3 °C) 68 26 (!) 122/56 96 %   06/03/22 0744 -- -- -- -- 94 %   06/03/22 0446 -- 68 20 -- 90 %   06/03/22 0356 97.7 °F (36.5 °C) 68 24 (!) 135/56 94 %   06/03/22 0155 -- 66 22 -- 92 %   06/03/22 0146 -- -- -- -- 93 %   06/03/22 0000 97.5 °F (36.4 °C) 66 22 (!) 115/51 94 %   06/02/22 2330 -- 68 26 (!) 112/48 94 %   06/02/22 2300 -- 64 22 (!) 106/53 95 %   06/02/22 2245 -- 65 25 (!) 106/55 94 %   06/02/22 2230 -- 69 23 (!) 102/43 (!) 89 %   06/02/22 2215 97.4 °F (36.3 °C) 69 28 (!) 111/42 97 %   06/02/22 2200 97.7 °F (36.5 °C) 68 20 (!) 119/54 98 %   06/02/22 2145 98.1 °F (36.7 °C) 68 25 (!) 129/55 100 %   06/02/22 2135 -- -- -- -- 95 %   06/02/22 2026 98.1 °F (36.7 °C) 72 24 (!) 122/53 97 %   06/02/22 1450 98.2 °F (36.8 °C) 71 30 (!) 125/50 93 %       Intake/Output Summary (Last 24 hours) at 6/3/2022 1337  Last data filed at 6/3/2022 1232  Gross per 24 hour   Intake 431.3 ml   Output 550 ml   Net -118.7 ml        I had a face to face encounter and independently examined this patient on 6/3/2022, as outlined below:  PHYSICAL EXAM:  General: Awake, No acute distress  EENT:  EOMI. Anicteric sclerae.  MMM  Resp:  CTA bilaterally, no wheezing or rales. No accessory muscle use  CV:  Regular  rhythm,  No edema  GI:  Soft, Non distended, Non tender. +Bowel sounds  Neurologic:  Alert and oriented X 3, normal speech,   Psych:   Not anxious nor agitated  Skin:  No rashes. No jaundice    Reviewed most current lab test results and cultures  YES  Reviewed most current radiology test results   YES  Review and summation of old records today    NO  Reviewed patient's current orders and MAR    YES  PMH/SH reviewed - no change compared to H&P  ________________________________________________________________________  Care Plan discussed with:    Comments   Patient y    Family      RN y    Care Manager     Consultant                        Multidiciplinary team rounds were held today with , nursing, pharmacist and clinical coordinator. Patient's plan of care was discussed; medications were reviewed and discharge planning was addressed. ________________________________________________________________________  Total NON critical care TIME: 38   Minutes    Total CRITICAL CARE TIME Spent:   Minutes non procedure based      Comments   >50% of visit spent in counseling and coordination of care     ________________________________________________________________________  Shira Caldwell MD     Procedures: see electronic medical records for all procedures/Xrays and details which were not copied into this note but were reviewed prior to creation of Plan. LABS:  I reviewed today's most current labs and imaging studies.   Pertinent labs include:  Recent Labs     06/03/22  0357 06/02/22  0108 06/01/22  0312   WBC 16.3* 14.9* 13.6*   HGB 8.3* 7.3* 7.5*   HCT 29.1* 25.7* 25.7*   * 133* 122*     Recent Labs     06/03/22  0357 06/02/22  0108 06/01/22  0312    142 143   K 3.7 3.8 3.8    102 100   CO2 35* 37* 42*   * 165* 111*   BUN 19 21* 22*   CREA 0.47* 0.49* 0.45*   CA 8.2* 8.2* 8.3*       Signed: Shira Caldwell MD

## 2022-06-04 ENCOUNTER — APPOINTMENT (OUTPATIENT)
Dept: GENERAL RADIOLOGY | Age: 62
DRG: 189 | End: 2022-06-04
Attending: STUDENT IN AN ORGANIZED HEALTH CARE EDUCATION/TRAINING PROGRAM
Payer: MEDICARE

## 2022-06-04 LAB
ANION GAP SERPL CALC-SCNC: 3 MMOL/L (ref 5–15)
BUN SERPL-MCNC: 19 MG/DL (ref 6–20)
BUN/CREAT SERPL: 28 (ref 12–20)
CALCIUM SERPL-MCNC: 8.3 MG/DL (ref 8.5–10.1)
CHLORIDE SERPL-SCNC: 106 MMOL/L (ref 97–108)
CO2 SERPL-SCNC: 35 MMOL/L (ref 21–32)
COVID-19 RAPID TEST, COVR: NOT DETECTED
CREAT SERPL-MCNC: 0.68 MG/DL (ref 0.55–1.02)
ERYTHROCYTE [DISTWIDTH] IN BLOOD BY AUTOMATED COUNT: 18.1 % (ref 11.5–14.5)
GLUCOSE BLD STRIP.AUTO-MCNC: 101 MG/DL (ref 65–117)
GLUCOSE BLD STRIP.AUTO-MCNC: 189 MG/DL (ref 65–117)
GLUCOSE BLD STRIP.AUTO-MCNC: 217 MG/DL (ref 65–117)
GLUCOSE BLD STRIP.AUTO-MCNC: 221 MG/DL (ref 65–117)
GLUCOSE SERPL-MCNC: 106 MG/DL (ref 65–100)
HCT VFR BLD AUTO: 30.5 % (ref 35–47)
HGB BLD-MCNC: 8.7 G/DL (ref 11.5–16)
MCH RBC QN AUTO: 30.9 PG (ref 26–34)
MCHC RBC AUTO-ENTMCNC: 28.5 G/DL (ref 30–36.5)
MCV RBC AUTO: 108.2 FL (ref 80–99)
NRBC # BLD: 0.05 K/UL (ref 0–0.01)
NRBC BLD-RTO: 0.2 PER 100 WBC
PLATELET # BLD AUTO: 154 K/UL (ref 150–400)
PMV BLD AUTO: 12.4 FL (ref 8.9–12.9)
POTASSIUM SERPL-SCNC: 3.6 MMOL/L (ref 3.5–5.1)
RBC # BLD AUTO: 2.82 M/UL (ref 3.8–5.2)
SERVICE CMNT-IMP: ABNORMAL
SERVICE CMNT-IMP: NORMAL
SODIUM SERPL-SCNC: 144 MMOL/L (ref 136–145)
SOURCE, COVRS: NORMAL
TROPONIN-HIGH SENSITIVITY: 12 NG/L (ref 0–51)
WBC # BLD AUTO: 21.9 K/UL (ref 3.6–11)

## 2022-06-04 PROCEDURE — 65270000046 HC RM TELEMETRY

## 2022-06-04 PROCEDURE — 74011000250 HC RX REV CODE- 250: Performed by: STUDENT IN AN ORGANIZED HEALTH CARE EDUCATION/TRAINING PROGRAM

## 2022-06-04 PROCEDURE — 74011250636 HC RX REV CODE- 250/636: Performed by: STUDENT IN AN ORGANIZED HEALTH CARE EDUCATION/TRAINING PROGRAM

## 2022-06-04 PROCEDURE — C9113 INJ PANTOPRAZOLE SODIUM, VIA: HCPCS | Performed by: STUDENT IN AN ORGANIZED HEALTH CARE EDUCATION/TRAINING PROGRAM

## 2022-06-04 PROCEDURE — 74011636637 HC RX REV CODE- 636/637: Performed by: HOSPITALIST

## 2022-06-04 PROCEDURE — 87635 SARS-COV-2 COVID-19 AMP PRB: CPT

## 2022-06-04 PROCEDURE — 93005 ELECTROCARDIOGRAM TRACING: CPT

## 2022-06-04 PROCEDURE — 85027 COMPLETE CBC AUTOMATED: CPT

## 2022-06-04 PROCEDURE — 71045 X-RAY EXAM CHEST 1 VIEW: CPT

## 2022-06-04 PROCEDURE — 74011636637 HC RX REV CODE- 636/637: Performed by: STUDENT IN AN ORGANIZED HEALTH CARE EDUCATION/TRAINING PROGRAM

## 2022-06-04 PROCEDURE — 94640 AIRWAY INHALATION TREATMENT: CPT

## 2022-06-04 PROCEDURE — 74011250637 HC RX REV CODE- 250/637: Performed by: INTERNAL MEDICINE

## 2022-06-04 PROCEDURE — 74011250637 HC RX REV CODE- 250/637: Performed by: STUDENT IN AN ORGANIZED HEALTH CARE EDUCATION/TRAINING PROGRAM

## 2022-06-04 PROCEDURE — 80048 BASIC METABOLIC PNL TOTAL CA: CPT

## 2022-06-04 PROCEDURE — 84484 ASSAY OF TROPONIN QUANT: CPT

## 2022-06-04 PROCEDURE — 74011000250 HC RX REV CODE- 250: Performed by: INTERNAL MEDICINE

## 2022-06-04 PROCEDURE — 94660 CPAP INITIATION&MGMT: CPT

## 2022-06-04 PROCEDURE — 74011250637 HC RX REV CODE- 250/637: Performed by: HOSPITALIST

## 2022-06-04 PROCEDURE — 74011250636 HC RX REV CODE- 250/636: Performed by: HOSPITALIST

## 2022-06-04 PROCEDURE — 82803 BLOOD GASES ANY COMBINATION: CPT

## 2022-06-04 PROCEDURE — 74011000250 HC RX REV CODE- 250: Performed by: HOSPITALIST

## 2022-06-04 PROCEDURE — 36415 COLL VENOUS BLD VENIPUNCTURE: CPT

## 2022-06-04 PROCEDURE — 36600 WITHDRAWAL OF ARTERIAL BLOOD: CPT

## 2022-06-04 PROCEDURE — 82962 GLUCOSE BLOOD TEST: CPT

## 2022-06-04 PROCEDURE — 74011000258 HC RX REV CODE- 258: Performed by: STUDENT IN AN ORGANIZED HEALTH CARE EDUCATION/TRAINING PROGRAM

## 2022-06-04 PROCEDURE — 74011000250 HC RX REV CODE- 250: Performed by: ANESTHESIOLOGY

## 2022-06-04 RX ORDER — SODIUM CHLORIDE 0.9 % (FLUSH) 0.9 %
5-40 SYRINGE (ML) INJECTION EVERY 8 HOURS
Status: DISCONTINUED | OUTPATIENT
Start: 2022-06-04 | End: 2022-06-09 | Stop reason: ALTCHOICE

## 2022-06-04 RX ORDER — SODIUM CHLORIDE 0.9 % (FLUSH) 0.9 %
5-40 SYRINGE (ML) INJECTION AS NEEDED
Status: DISCONTINUED | OUTPATIENT
Start: 2022-06-04 | End: 2022-06-09 | Stop reason: ALTCHOICE

## 2022-06-04 RX ORDER — LIDOCAINE HYDROCHLORIDE 10 MG/ML
0.1 INJECTION, SOLUTION EPIDURAL; INFILTRATION; INTRACAUDAL; PERINEURAL AS NEEDED
Status: DISCONTINUED | OUTPATIENT
Start: 2022-06-04 | End: 2022-06-17 | Stop reason: HOSPADM

## 2022-06-04 RX ORDER — FUROSEMIDE 10 MG/ML
20 INJECTION INTRAMUSCULAR; INTRAVENOUS ONCE
Status: COMPLETED | OUTPATIENT
Start: 2022-06-04 | End: 2022-06-04

## 2022-06-04 RX ORDER — TRAMADOL HYDROCHLORIDE 50 MG/1
50 TABLET ORAL
Status: DISCONTINUED | OUTPATIENT
Start: 2022-06-04 | End: 2022-06-17 | Stop reason: HOSPADM

## 2022-06-04 RX ADMIN — SODIUM CHLORIDE 40 MG: 9 INJECTION INTRAMUSCULAR; INTRAVENOUS; SUBCUTANEOUS at 21:44

## 2022-06-04 RX ADMIN — PIPERACILLIN AND TAZOBACTAM 3.38 G: 3; .375 INJECTION, POWDER, LYOPHILIZED, FOR SOLUTION INTRAVENOUS at 13:00

## 2022-06-04 RX ADMIN — SODIUM CHLORIDE, PRESERVATIVE FREE 10 ML: 5 INJECTION INTRAVENOUS at 06:22

## 2022-06-04 RX ADMIN — Medication: at 18:01

## 2022-06-04 RX ADMIN — ONDANSETRON 4 MG: 2 INJECTION INTRAMUSCULAR; INTRAVENOUS at 09:33

## 2022-06-04 RX ADMIN — AMIODARONE HYDROCHLORIDE 200 MG: 200 TABLET ORAL at 09:22

## 2022-06-04 RX ADMIN — MICONAZOLE NITRATE: 20 CREAM TOPICAL at 09:23

## 2022-06-04 RX ADMIN — SODIUM CHLORIDE, PRESERVATIVE FREE 10 ML: 5 INJECTION INTRAVENOUS at 21:47

## 2022-06-04 RX ADMIN — ACETAZOLAMIDE 500 MG: 500 CAPSULE, EXTENDED RELEASE ORAL at 09:21

## 2022-06-04 RX ADMIN — PREDNISONE 20 MG: 20 TABLET ORAL at 09:22

## 2022-06-04 RX ADMIN — Medication 3 UNITS: at 18:01

## 2022-06-04 RX ADMIN — ACETAMINOPHEN 650 MG: 325 TABLET ORAL at 10:38

## 2022-06-04 RX ADMIN — IPRATROPIUM BROMIDE AND ALBUTEROL SULFATE 3 ML: .5; 3 SOLUTION RESPIRATORY (INHALATION) at 08:36

## 2022-06-04 RX ADMIN — SUCRALFATE 1 G: 1 TABLET ORAL at 09:22

## 2022-06-04 RX ADMIN — SODIUM CHLORIDE, PRESERVATIVE FREE 10 ML: 5 INJECTION INTRAVENOUS at 21:46

## 2022-06-04 RX ADMIN — SODIUM CHLORIDE, PRESERVATIVE FREE 10 ML: 5 INJECTION INTRAVENOUS at 13:00

## 2022-06-04 RX ADMIN — MICONAZOLE NITRATE: 20 CREAM TOPICAL at 21:45

## 2022-06-04 RX ADMIN — METHYLPREDNISOLONE SODIUM SUCCINATE 60 MG: 125 INJECTION, POWDER, FOR SOLUTION INTRAMUSCULAR; INTRAVENOUS at 12:56

## 2022-06-04 RX ADMIN — METOPROLOL TARTRATE 25 MG: 25 TABLET, FILM COATED ORAL at 09:22

## 2022-06-04 RX ADMIN — GABAPENTIN 100 MG: 100 CAPSULE ORAL at 09:22

## 2022-06-04 RX ADMIN — TRAMADOL HYDROCHLORIDE 50 MG: 50 TABLET, COATED ORAL at 12:51

## 2022-06-04 RX ADMIN — Medication 2 UNITS: at 21:54

## 2022-06-04 RX ADMIN — PIPERACILLIN AND TAZOBACTAM 3.38 G: 3; .375 INJECTION, POWDER, LYOPHILIZED, FOR SOLUTION INTRAVENOUS at 06:21

## 2022-06-04 RX ADMIN — SUCRALFATE 1 G: 1 TABLET ORAL at 11:51

## 2022-06-04 RX ADMIN — SODIUM CHLORIDE 40 MG: 9 INJECTION INTRAMUSCULAR; INTRAVENOUS; SUBCUTANEOUS at 09:22

## 2022-06-04 RX ADMIN — Medication: at 09:22

## 2022-06-04 RX ADMIN — Medication 2 UNITS: at 11:50

## 2022-06-04 RX ADMIN — PIPERACILLIN AND TAZOBACTAM 3.38 G: 3; .375 INJECTION, POWDER, LYOPHILIZED, FOR SOLUTION INTRAVENOUS at 21:50

## 2022-06-04 RX ADMIN — FUROSEMIDE 20 MG: 10 INJECTION, SOLUTION INTRAMUSCULAR; INTRAVENOUS at 11:50

## 2022-06-04 RX ADMIN — IPRATROPIUM BROMIDE AND ALBUTEROL SULFATE 3 ML: .5; 3 SOLUTION RESPIRATORY (INHALATION) at 19:46

## 2022-06-04 NOTE — PROGRESS NOTES
Patient was seen for worsening respiratory status. Desaturated to 80s, increased to 15 liters by nursing. I evaluated her at the bedside. Complains of some chest pain, shortness of breath and b/l leg pain    General examination: Ao x 2, non focal examination  Chest: decreased air entry b/l lungs  Heart: normal sound  Abdomen: soft non tender  B/l LE: non pitting edema. Ordered stat ABG shows pH of 7.2, pCO2: 74  CXR; pending  EKG shows: no acute ischemia  Troponin pending. Intensivist consulted for possible need of bipap, can remain in step down unit as per them for now  Continue with her Triology device. Check ABG in Am.   Monitor mental status closely  Started on lasix, diamox stopped earlier today  Given iv steroid, may decrease it quickly    Critical care time spent 40 min

## 2022-06-04 NOTE — PROGRESS NOTES
Writer accessed chart for impending admission to [facility] 2391. Transfer has since been canceled as pt. will remain as stepdown status.

## 2022-06-04 NOTE — PROGRESS NOTES
Pharmacy Antimicrobial Kinetic Dosing    Indication for Antimicrobials: Bone & Joint Infection    Current Regimen of Each Antimicrobial:  Vancomycin Pharmacy to Dose (Start Date ; Day 4  Zosyn 3.375 g IV q8h - through     Previous Antimicrobial Therapy:  Daptomycin     Goal Level: AUC: 400-600 mg/hr/Liter/day    Date Dose & Interval Measured (mcg/mL) Predicted AUC/SUDHA   6/3/22 1500 mg Q12H 37.1, 45.8 (random) > 600                   Dosing calculator used: [employer] calculator    Significant Positive Cultures:    blood: ng, final    Conditions for Dosing Consideration: None    Labs:  Recent Labs     22  0431 22  0357 22  0108   CREA 0.68 0.47* 0.49*   BUN 19 19 21*     Recent Labs     22  0431 22  0357 22  0108   WBC 21.9* 16.3* 14.9*     Temp (24hrs), Av.5 °F (36.9 °C), Min:97.2 °F (36.2 °C), Max:99.3 °F (37.4 °C)      Creatinine Clearance (mL/min):   CrCl (Ideal Body Weight): 68.9      Impression/Plan:   Clearance is ~ half of anticipated for calculated creatinine clearance. Vancomycin 1250 mg Q24H has anticipate trough of 13-16 mcg/ml with an AUC ~ 500. Level expected to be 15 mcg/ml on  6:00. Will order vancomycin level with am labs on . Restart vancomycin once level is therapeutic. Antimicrobial stop date:      Pharmacy will follow daily and adjust medications as appropriate for renal function and/or serum levels.     Thank you,  HIRAL Flores

## 2022-06-04 NOTE — PROGRESS NOTES
06/04/22 1120   Oxygen Therapy   O2 Sat (%) 91 %   Pulse via Oximetry 78 beats per minute   O2 Device Hi flow nasal cannula  (RT: Placed on 15L mid flow )   Skin Assessment Clean, dry, & intact   Skin Protection for O2 Device N/A   O2 Flow Rate (L/min) 15 l/min     Upon arrival, patient Sp02 in the mid 80's on 4L NC. Placed on 15L mid flow and will titrate oxygen as tolerated.

## 2022-06-04 NOTE — PROGRESS NOTES
Hospitalist Progress Note    NAME: Avelino Hendricks   :  1960   MRN:  [id number]       Assessment / Plan:  Acute on chronic hypoxic/hypercapnic respiratory failure-resolved  Left-sided pleural effusion thoracentesis  JOSLYN/OHS on trilogy  COPD on O2    Clinically symptoms improved  Tapering oral steroids, on 20 mg, last dose tomorrow  Continue trilogy device as per pulmonary. CM working for [employer] at [facility]  Seen by pulmonary continue current treatment  On 3 liters of oxygen which is baseline  Leukocytosis likely d/t steroids    Acute on chronic anemia:  Hb dropped gradually, received 1 unit on , hb stable since then  Stool occult positive  Protonix 40 q12 hours  Holding xarelto  S/p EGD on 6/3, no active bleeding, biopsy sent, f/u results. Can resume xarelto  (72 hours after EGD)  Possible D/c to snf tomorrow if hb stable     Left hip fracture displaced  age undetermined  Left hip osteomyelitis/septic arthritis   Bilateral buttock and sacral wounds      -[facility] evaluated no surgical intervention planned  -S/p  Aspiration?/Bx by IR, culture negative. -ID recommends daptomycin and cefepime on discharge last admit.  End date . Go [facility]ada[facility] go to SNF with Daptomycin, switched to vancomycin , ID agrees with this      Metabolic alkalosis  Improving, stopped diamox  Resume oral lasix    Chronic diastolic heart failure/PAF:   Continue  amiodarone . Lasix resumed  Holding xarelto    Steroid induced hyperglycemia-  A1c level 5.4 on , ISS    HTN   controlled.  Cont' PTA meds.     Morbid obesity/Impaired mobility/functional quadriplegia-supportive care  Drug rash- suspected from cefepime now resolved.     Hypokalemia: Resolved     DVT PRx  on Xarelto  NOK    TOM:      Subjective:     Chief Complaint / Reason for Physician Visit  She remains on 3 liters oxygen  No bloody bm yesterday or today    Review of Systems:  Symptom Y/N Comments  Symptom Y/N Comments   Fever/Chills    Chest Pain     Poor Appetite    Edema     Cough    Abdominal Pain     Sputum    Joint Pain     SOB/CHOPRA    Pruritis/Rash     Nausea/vomit    Tolerating PT/OT     Diarrhea    Tolerating Diet     Constipation    Other       Could NOT obtain due to:      Objective:     VITALS:   Last 24hrs VS reviewed since prior progress note. Most recent are:  Patient Vitals for the past 24 hrs:   Temp Pulse Resp BP SpO2   06/04/22 0839 -- -- -- -- 94 %   06/04/22 0745 99.3 °F (37.4 °C) 76 (!) 35 (!) 119/41 93 %   06/04/22 0425 -- -- -- -- 96 %   06/04/22 0405 97.8 °F (36.6 °C) 70 22 (!) 125/56 96 %   06/04/22 0025 -- -- -- -- 96 %   06/03/22 2315 98.9 °F (37.2 °C) 71 21 (!) 111/47 95 %   06/03/22 2127 -- -- -- -- 92 %   06/03/22 1942 98.6 °F (37 °C) 84 26 (!) 151/63 90 %   06/03/22 1520 99.1 °F (37.3 °C) 75 25 103/86 (!) 87 %   06/03/22 1309 97.2 °F (36.2 °C) 69 30 (!) 122/42 95 %   06/03/22 1250 -- 69 28 (!) 118/44 91 %   06/03/22 1245 -- 69 28 (!) 119/46 94 %   06/03/22 1240 -- 68 27 (!) 109/40 99 %   06/03/22 1238 98.2 °F (36.8 °C) 68 23 (!) 114/43 99 %   06/03/22 1135 98.3 °F (36.8 °C) 67 25 (!) 116/52 98 %       Intake/Output Summary (Last 24 hours) at 6/4/2022 1107  Last data filed at 6/4/2022 0919  Gross per 24 hour   Intake 920 ml   Output 1000 ml   Net -80 ml        I had a face to face encounter and independently examined this patient on 6/4/2022, as outlined below:  PHYSICAL EXAM:  General: Awake, No acute distress  EENT:  EOMI. Anicteric sclerae. MMM  Resp:  CTA bilaterally, no wheezing or rales. No accessory muscle use  CV:  Regular  rhythm,  No edema  GI:  Soft, Non distended, Non tender. +Bowel sounds  Neurologic:  Alert and oriented X 3, normal speech,   Psych:   Not anxious nor agitated  Skin:  No rashes.   No jaundice    Reviewed most current lab test results and cultures  YES  Reviewed most current radiology test results   YES  Review and summation of old records today    NO  Reviewed patient's current orders and MAR    YES  PMH/SH reviewed - no change compared to H&P  ________________________________________________________________________  Care Plan discussed with:    Comments   Patient y    Family      RN y    Care Manager     Consultant                        Multidiciplinary team rounds were held today with , nursing, pharmacist and clinical coordinator. Patient's plan of care was discussed; medications were reviewed and discharge planning was addressed. ________________________________________________________________________  Total NON critical care TIME: 38   Minutes    Total CRITICAL CARE TIME Spent:   Minutes non procedure based      Comments   >50% of visit spent in counseling and coordination of care     ________________________________________________________________________  Trini Sagastume MD     Procedures: see electronic medical records for all procedures/Xrays and details which were not copied into this note but were reviewed prior to creation of Plan. LABS:  I reviewed today's most current labs and imaging studies.   Pertinent labs include:  Recent Labs     06/04/22  0431 06/03/22  0357 06/02/22  0108   WBC 21.9* 16.3* 14.9*   HGB 8.7* 8.3* 7.3*   HCT 30.5* 29.1* 25.7*    135* 133*     Recent Labs     06/04/22  0431 06/03/22  0357 06/02/22  0108    143 142   K 3.6 3.7 3.8    105 102   CO2 35* 35* 37*   * 105* 165*   BUN 19 19 21*   CREA 0.68 0.47* 0.49*   CA 8.3* 8.2* 8.2*       Signed: Trini Sagastume MD

## 2022-06-04 NOTE — PROGRESS NOTES
6/4/22 10:32 AM  RN sent perfect serve to MD Rene to inform that pt was currently on 6 liters of O2 and sats are currently 81-82%. Pt denies feeling short of breath. 6/4/22 10:38 AM  RN applied new pulse ox to ensure accuracy. Pt is currenlty satting at 84%. RN informed respiratory therapist as well. RN placed pt on nonrebreather per RT. RT will come by to evaluate pt.    6/4/22 10:41 AM  MD stated to give pt 20 mg IV lasix     6/4/22 11:50 AM   RN gave lasix dose. MD present at bedside to evaluate pt. Pt transitioned to 15 L HF by respiratory therapist; oxygen sats 91-92%. Pt complained of chest pain. MD ordered ABG, CXR, troponin, EKG. MD spoke to intensivist regarding pt's status. Per intensivist, pt can remain on step down for now. Pt is to continue with trilogy device on BIPAP with 10 L oxygen. 6/4/22 3:51 PM  Sent perfect serve to inform that the pt is now more drowsy with decreased command following while on new oxygen settings. Tramadol was given around 12:51 pm.     6/4/22 3:53 PM  MD stated to continue to monitor for now.

## 2022-06-04 NOTE — PROGRESS NOTES
ICU Progress Note        Subjective: Sitting comfortably on the bed, denies any complaints. Vital Signs:    Visit Vitals  BP (!) 111/48 (BP 1 Location: Right lower arm, BP Patient Position: At rest)   Pulse 77   Temp 98.9 °F (37.2 °C)   Resp 20   Ht 5' 3\" (1.6 m)   Wt 140.5 kg (309 lb 12.8 oz)   SpO2 92%   BMI 54.88 kg/m²       O2 Device: (S) Hi flow nasal cannula (RT: Placed on 15L mid flow )   O2 Flow Rate (L/min): 15 l/min   Temp (24hrs), Av.8 °F (37.1 °C), Min:97.8 °F (36.6 °C), Max:99.3 °F (37.4 °C)       Intake/Output:   Last shift:       07 - 1900  In: 100 [P.O.:100]  Out: -   Last 3 shifts:  190 -  0700  In: 1151.3 [P.O.:120; I.V.:700]  Out: 1550 [Urine:1550]    Intake/Output Summary (Last 24 hours) at 2022 1358  Last data filed at 2022 0919  Gross per 24 hour   Intake 820 ml   Output 1000 ml   Net -180 ml       Ventilator Settings:  Ventilator Mode: PRVC (avaps)  Respiratory Rate  Back-Up Rate: 4  Insp Time (sec): 0.6 sec  I:E Ratio: 1:5.2  Ventilator Volumes  Vt Spont (ml): 288 ml  Ve Observed (l/min): 7.3 l/min  Ventilator Pressures  PC Set: 13  Pressure Support (cm H2O): 12 cm H2O  PIP Observed (cm H2O): 6.5 cm H2O  MAP (cm H2O): 10  PEEP/VENT (cm H2O): 8 cm H20    Physical Exam:    General: Alert, awake and oriented.  Not in acute distress  HEENT:  Anicteric sclerae; pink palpebral conjunctivae; mucosa moist  Resp:  Bilateral air entry +, no crackles or wheeze  CV:  S1, S2 present  GI:  Abdomen soft, non-tender; (+) active bowel sounds  Extremities:  +2 pulses on all extremities; no edema/ cyanosis/ clubbing noted  Skin:  Warm; no rashes/ lesions noted  Neurologic:  Non-focal    DATA:     Current Facility-Administered Medications   Medication Dose Route Frequency    sodium chloride (NS) flush 5-40 mL  5-40 mL IntraVENous Q8H    sodium chloride (NS) flush 5-40 mL  5-40 mL IntraVENous PRN    lidocaine (PF) (XYLOCAINE) 10 mg/mL (1 %) injection 0.1 mL  0.1 mL SubCUTAneous PRN    traMADoL (ULTRAM) tablet 50 mg  50 mg Oral Q6H PRN    VANCOMYCIN INFORMATION NOTE   Other Rx Dosing/Monitoring    [START ON 6/5/2022] methylPREDNISolone (PF) (Solu-MEDROL) injection 60 mg  60 mg IntraVENous DAILY    sodium chloride (NS) flush 5-40 mL  5-40 mL IntraVENous Q8H    sodium chloride (NS) flush 5-40 mL  5-40 mL IntraVENous PRN    sucralfate (CARAFATE) tablet 1 g  1 g Oral TIDAC    [START ON 6/5/2022] Vancomycin Level 4 am 6/5   Other ONCE    pantoprazole (PROTONIX) 40 mg in 0.9% sodium chloride 10 mL injection  40 mg IntraVENous Q12H    piperacillin-tazobactam (ZOSYN) 3.375 g in 0.9% sodium chloride (MBP/ADV) 100 mL MBP  3.375 g IntraVENous Q8H    0.9% sodium chloride infusion 250 mL  250 mL IntraVENous PRN    albuterol-ipratropium (DUO-NEB) 2.5 MG-0.5 MG/3 ML  3 mL Nebulization BID    metoprolol tartrate (LOPRESSOR) tablet 25 mg  25 mg Oral BID    glucose chewable tablet 16 g  4 Tablet Oral PRN    dextrose 10% infusion 0-250 mL  0-250 mL IntraVENous PRN    glucagon (GLUCAGEN) injection 1 mg  1 mg IntraMUSCular PRN    insulin lispro (HUMALOG) injection   SubCUTAneous AC&HS    furosemide (LASIX) tablet 40 mg  40 mg Oral DAILY    ammonium lactate (LAC-HYDRIN) 12 % lotion   Topical BID    miconazole (SECURA) 2 % extra thick cream   Topical BID    lidocaine 4 % patch 2 Patch  2 Patch TransDERmal Q24H    diphenhydrAMINE (BENADRYL) capsule 25 mg  25 mg Oral Q6H PRN    acetaminophen (TYLENOL) tablet 650 mg  650 mg Oral Q6H PRN    Or    acetaminophen (TYLENOL) suppository 650 mg  650 mg Rectal Q6H PRN    polyethylene glycol (MIRALAX) packet 17 g  17 g Oral DAILY PRN    ondansetron (ZOFRAN ODT) tablet 4 mg  4 mg Oral Q8H PRN    Or    ondansetron (ZOFRAN) injection 4 mg  4 mg IntraVENous Q6H PRN    amiodarone (CORDARONE) tablet 200 mg  200 mg Oral DAILY    gabapentin (NEURONTIN) capsule 100 mg  100 mg Oral TID    [Held by provider] rivaroxaban (XARELTO) tablet 20 mg 20 mg Oral DAILY WITH BREAKFAST         Labs: Results:       Chemistry Recent Labs     06/04/22  0431 06/03/22  0357 06/02/22  0108   * 105* 165*    143 142   K 3.6 3.7 3.8    105 102   CO2 35* 35* 37*   BUN 19 19 21*   CREA 0.68 0.47* 0.49*   CA 8.3* 8.2* 8.2*   AGAP 3* 3* 3*   BUCR 28* 40* 43*      CBC w/Diff Recent Labs     06/04/22  0431 06/03/22  0357 06/02/22  0108   WBC 21.9* 16.3* 14.9*   RBC 2.82* 2.77* 2.40*   HGB 8.7* 8.3* 7.3*   HCT 30.5* 29.1* 25.7*    135* 133*      Coagulation No results for input(s): PTP, INR, APTT, INREXT in the last 72 hours. Liver Enzymes No results for input(s): TP, ALB, TBIL, AP in the last 72 hours. No lab exists for component: SGOT, GPT, DBIL   ABG Lab Results   Component Value Date/Time    PH 7.20 (LL) 06/04/2022 12:29 PM    PCO2 74 (H) 06/04/2022 12:29 PM    PO2 63 (L) 06/04/2022 12:29 PM    HCO3 28 (H) 06/04/2022 12:29 PM      Microbiology No results for input(s): CULT in the last 72 hours. maging:  CXR Results  (Last 48 hours)    None          CT Results  (Last 48 hours)    None               Assessment and Plan:    The patient is a 61/F with medical history as below who was admitted on 05/23 for acute on chronic hypercapnic respiratory failure. Initially admitted to ICU and then transferred to step down on 05/24 after treating with BiPAP. We have been re consulted for hypercapnia. Chronic hypercapnic respiratory failure - patient is currently mentating well and denies any SOB or wheezing/cough. I reviewed her ABG which shows pH of 7.2, pCO2 is 74 and bicarb of 28. It's worth noting that she has been ventilated pretty nicely and pCO2 dropped from 150 seen on admission. The reason for her low pH is not hypercapnia per se but due to decrease in bicarb which is due to diamox use. Plan  - I would continue current BiPAP settings. Do not hyperventilate patient as she would loose her bicarb compensation.  She lives around pCO2 of 70-80 mmHg. As her bicarb improves her pH will improve. - DO NOT use Diamox. - Chest x-ray shows volume overload, agree with restarting her lasix (I would recommend changing it to IV for few days). - De-escalate steroids, she is not in exacerbation. She has very good bilateral air entry. - Saturation goal is 88-92%  - The reason for her chronic hypercapnic respiratory failure is likely obesity hypoventilation and sleep apnea. - Cont. The care in stepdown unit. CCM time - 40 minutes.      Sigrid Mejia MD, FCCP, ATSF, FACP, DAABIP  Interventional Pulmonology/Critical [address] [place] [address]

## 2022-06-04 NOTE — PROGRESS NOTES
06/04/22 1403   CPAP/BIPAP   CPAP/BIPAP Start/Stop On   Device Mode S/T  (ResMed)   $$ Bipap Daily Yes   AVAP Set Resp Rate 10   Bio-Med ID # [id number]   Mask Type and Size Full face; Medium   Skin Condition   (CDI)   IPAP (cm H2O) 16 cm H2O   EPAP (cm H2O) 10 cm H2O   Vt Spont (ml) 284 ml   Backup Rate 10   Total RR (Spontaneous) 26 breaths per minute   Leak (Estimated) 0 L/min   Pt's Home Machine No   Biomedical Check Performed Yes   Settings Verified Yes     Spoke to MD about recent ABG and CXR. Patient uses home Trilogy and has been using the ResMed at night while she is in the hospital. Placed patient on ResMed 16/10 with backup RR 10 and 10 L bled into machine. Will continue to monitor patient.

## 2022-06-05 LAB
ANION GAP SERPL CALC-SCNC: 2 MMOL/L (ref 5–15)
APTT PPP: 105.6 SEC (ref 22.1–31)
APTT PPP: 63.5 SEC (ref 22.1–31)
ARTERIAL PATENCY WRIST A: YES
ATRIAL RATE: 74 BPM
BASE EXCESS BLDA CALC-SCNC: 1.7 MMOL/L
BASOPHILS # BLD: 0 K/UL (ref 0–0.1)
BASOPHILS NFR BLD: 0 % (ref 0–1)
BDY SITE: ABNORMAL
BUN SERPL-MCNC: 26 MG/DL (ref 6–20)
BUN/CREAT SERPL: 31 (ref 12–20)
CALCIUM SERPL-MCNC: 8.2 MG/DL (ref 8.5–10.1)
CALCULATED P AXIS, ECG09: 67 DEGREES
CALCULATED R AXIS, ECG10: 23 DEGREES
CALCULATED T AXIS, ECG11: 58 DEGREES
CHLORIDE SERPL-SCNC: 106 MMOL/L (ref 97–108)
CO2 SERPL-SCNC: 35 MMOL/L (ref 21–32)
CREAT SERPL-MCNC: 0.83 MG/DL (ref 0.55–1.02)
DIAGNOSIS, 93000: NORMAL
DIFFERENTIAL METHOD BLD: ABNORMAL
EOSINOPHIL # BLD: 0 K/UL (ref 0–0.4)
EOSINOPHIL NFR BLD: 0 % (ref 0–7)
ERYTHROCYTE [DISTWIDTH] IN BLOOD BY AUTOMATED COUNT: 17.7 % (ref 11.5–14.5)
ERYTHROCYTE [DISTWIDTH] IN BLOOD BY AUTOMATED COUNT: 17.7 % (ref 11.5–14.5)
GAS FLOW.O2 O2 DELIVERY SYS: 8 L/MIN
GLUCOSE BLD STRIP.AUTO-MCNC: 178 MG/DL (ref 65–117)
GLUCOSE SERPL-MCNC: 179 MG/DL (ref 65–100)
HCO3 BLDA-SCNC: 33 MMOL/L (ref 22–26)
HCT VFR BLD AUTO: 28.6 % (ref 35–47)
HCT VFR BLD AUTO: 31.4 % (ref 35–47)
HGB BLD-MCNC: 8.1 G/DL (ref 11.5–16)
HGB BLD-MCNC: 8.8 G/DL (ref 11.5–16)
IMM GRANULOCYTES # BLD AUTO: 0.4 K/UL (ref 0–0.04)
IMM GRANULOCYTES NFR BLD AUTO: 2 % (ref 0–0.5)
IPAP/PIP, IPAPIP: 16
LYMPHOCYTES # BLD: 0.6 K/UL (ref 0.8–3.5)
LYMPHOCYTES NFR BLD: 3 % (ref 12–49)
MCH RBC QN AUTO: 30.3 PG (ref 26–34)
MCH RBC QN AUTO: 31 PG (ref 26–34)
MCHC RBC AUTO-ENTMCNC: 28 G/DL (ref 30–36.5)
MCHC RBC AUTO-ENTMCNC: 28.3 G/DL (ref 30–36.5)
MCV RBC AUTO: 107.1 FL (ref 80–99)
MCV RBC AUTO: 110.6 FL (ref 80–99)
MONOCYTES # BLD: 1 K/UL (ref 0–1)
MONOCYTES NFR BLD: 5 % (ref 5–13)
NEUTS SEG # BLD: 18.1 K/UL (ref 1.8–8)
NEUTS SEG NFR BLD: 90 % (ref 32–75)
NRBC # BLD: 0.03 K/UL (ref 0–0.01)
NRBC # BLD: 0.03 K/UL (ref 0–0.01)
NRBC BLD-RTO: 0.1 PER 100 WBC
NRBC BLD-RTO: 0.1 PER 100 WBC
P-R INTERVAL, ECG05: 160 MS
PCO2 BLDA: 83 MMHG (ref 35–45)
PEEP RESPIRATORY: 10 CM[H2O]
PH BLDA: 7.21 [PH] (ref 7.35–7.45)
PLATELET # BLD AUTO: 103 K/UL (ref 150–400)
PLATELET # BLD AUTO: 168 K/UL (ref 150–400)
PLATELET COMMENTS,PCOM: ABNORMAL
PMV BLD AUTO: 12.3 FL (ref 8.9–12.9)
PMV BLD AUTO: 13 FL (ref 8.9–12.9)
PO2 BLDA: 52 MMHG (ref 80–100)
POTASSIUM SERPL-SCNC: 4 MMOL/L (ref 3.5–5.1)
Q-T INTERVAL, ECG07: 394 MS
QRS DURATION, ECG06: 94 MS
QTC CALCULATION (BEZET), ECG08: 437 MS
RBC # BLD AUTO: 2.67 M/UL (ref 3.8–5.2)
RBC # BLD AUTO: 2.84 M/UL (ref 3.8–5.2)
RBC MORPH BLD: ABNORMAL
SAO2 % BLD: 77 % (ref 92–97)
SAO2% DEVICE SAO2% SENSOR NAME: ABNORMAL
SERVICE CMNT-IMP: ABNORMAL
SERVICE CMNT-IMP: ABNORMAL
SODIUM SERPL-SCNC: 143 MMOL/L (ref 136–145)
SPECIMEN SITE: ABNORMAL
THERAPEUTIC RANGE,PTTT: ABNORMAL SECS (ref 58–77)
THERAPEUTIC RANGE,PTTT: ABNORMAL SECS (ref 58–77)
VANCOMYCIN SERPL-MCNC: 21.6 UG/ML
VENTRICULAR RATE, ECG03: 74 BPM
WBC # BLD AUTO: 20.1 K/UL (ref 3.6–11)
WBC # BLD AUTO: 25.5 K/UL (ref 3.6–11)
WBC MORPH BLD: ABNORMAL

## 2022-06-05 PROCEDURE — 36600 WITHDRAWAL OF ARTERIAL BLOOD: CPT

## 2022-06-05 PROCEDURE — 94640 AIRWAY INHALATION TREATMENT: CPT

## 2022-06-05 PROCEDURE — 93005 ELECTROCARDIOGRAM TRACING: CPT

## 2022-06-05 PROCEDURE — 85730 THROMBOPLASTIN TIME PARTIAL: CPT

## 2022-06-05 PROCEDURE — 74011250636 HC RX REV CODE- 250/636: Performed by: INTERNAL MEDICINE

## 2022-06-05 PROCEDURE — 74011250636 HC RX REV CODE- 250/636: Performed by: STUDENT IN AN ORGANIZED HEALTH CARE EDUCATION/TRAINING PROGRAM

## 2022-06-05 PROCEDURE — 82803 BLOOD GASES ANY COMBINATION: CPT

## 2022-06-05 PROCEDURE — 80048 BASIC METABOLIC PNL TOTAL CA: CPT

## 2022-06-05 PROCEDURE — 74011000258 HC RX REV CODE- 258: Performed by: INTERNAL MEDICINE

## 2022-06-05 PROCEDURE — 74011000250 HC RX REV CODE- 250: Performed by: ANESTHESIOLOGY

## 2022-06-05 PROCEDURE — 85025 COMPLETE CBC W/AUTO DIFF WBC: CPT

## 2022-06-05 PROCEDURE — 74011000258 HC RX REV CODE- 258: Performed by: STUDENT IN AN ORGANIZED HEALTH CARE EDUCATION/TRAINING PROGRAM

## 2022-06-05 PROCEDURE — 74011000250 HC RX REV CODE- 250: Performed by: HOSPITALIST

## 2022-06-05 PROCEDURE — C9113 INJ PANTOPRAZOLE SODIUM, VIA: HCPCS | Performed by: STUDENT IN AN ORGANIZED HEALTH CARE EDUCATION/TRAINING PROGRAM

## 2022-06-05 PROCEDURE — 77030022017 HC DRSG HEMO QCLOT ZMED -A

## 2022-06-05 PROCEDURE — 82962 GLUCOSE BLOOD TEST: CPT

## 2022-06-05 PROCEDURE — 80202 ASSAY OF VANCOMYCIN: CPT

## 2022-06-05 PROCEDURE — 74011000250 HC RX REV CODE- 250: Performed by: INTERNAL MEDICINE

## 2022-06-05 PROCEDURE — 36415 COLL VENOUS BLD VENIPUNCTURE: CPT

## 2022-06-05 PROCEDURE — 94660 CPAP INITIATION&MGMT: CPT

## 2022-06-05 PROCEDURE — 65610000006 HC RM INTENSIVE CARE

## 2022-06-05 PROCEDURE — 74011000250 HC RX REV CODE- 250: Performed by: STUDENT IN AN ORGANIZED HEALTH CARE EDUCATION/TRAINING PROGRAM

## 2022-06-05 PROCEDURE — 85027 COMPLETE CBC AUTOMATED: CPT

## 2022-06-05 RX ORDER — HEPARIN SODIUM 10000 [USP'U]/100ML
7.1-25 INJECTION, SOLUTION INTRAVENOUS
Status: DISCONTINUED | OUTPATIENT
Start: 2022-06-05 | End: 2022-06-06

## 2022-06-05 RX ORDER — HEPARIN SODIUM 1000 [USP'U]/ML
4000 INJECTION, SOLUTION INTRAVENOUS; SUBCUTANEOUS AS NEEDED
Status: DISCONTINUED | OUTPATIENT
Start: 2022-06-05 | End: 2022-06-06

## 2022-06-05 RX ORDER — HEPARIN SODIUM 1000 [USP'U]/ML
2000 INJECTION, SOLUTION INTRAVENOUS; SUBCUTANEOUS AS NEEDED
Status: DISCONTINUED | OUTPATIENT
Start: 2022-06-05 | End: 2022-06-06

## 2022-06-05 RX ORDER — HEPARIN SODIUM 1000 [USP'U]/ML
4000 INJECTION, SOLUTION INTRAVENOUS; SUBCUTANEOUS ONCE
Status: COMPLETED | OUTPATIENT
Start: 2022-06-05 | End: 2022-06-05

## 2022-06-05 RX ORDER — FUROSEMIDE 10 MG/ML
40 INJECTION INTRAMUSCULAR; INTRAVENOUS 2 TIMES DAILY
Status: DISCONTINUED | OUTPATIENT
Start: 2022-06-05 | End: 2022-06-11

## 2022-06-05 RX ADMIN — PIPERACILLIN AND TAZOBACTAM 3.38 G: 3; .375 INJECTION, POWDER, LYOPHILIZED, FOR SOLUTION INTRAVENOUS at 06:46

## 2022-06-05 RX ADMIN — AMIODARONE HYDROCHLORIDE 150 MG: 1.5 INJECTION, SOLUTION INTRAVENOUS at 10:48

## 2022-06-05 RX ADMIN — SODIUM CHLORIDE, PRESERVATIVE FREE 10 ML: 5 INJECTION INTRAVENOUS at 23:16

## 2022-06-05 RX ADMIN — SODIUM CHLORIDE, PRESERVATIVE FREE 10 ML: 5 INJECTION INTRAVENOUS at 14:54

## 2022-06-05 RX ADMIN — FUROSEMIDE 40 MG: 10 INJECTION, SOLUTION INTRAMUSCULAR; INTRAVENOUS at 11:46

## 2022-06-05 RX ADMIN — SODIUM CHLORIDE 40 MG: 9 INJECTION INTRAMUSCULAR; INTRAVENOUS; SUBCUTANEOUS at 21:53

## 2022-06-05 RX ADMIN — MICONAZOLE NITRATE: 20 CREAM TOPICAL at 21:43

## 2022-06-05 RX ADMIN — SODIUM CHLORIDE, PRESERVATIVE FREE 10 ML: 5 INJECTION INTRAVENOUS at 06:31

## 2022-06-05 RX ADMIN — PIPERACILLIN AND TAZOBACTAM 3.38 G: 3; .375 INJECTION, POWDER, LYOPHILIZED, FOR SOLUTION INTRAVENOUS at 14:49

## 2022-06-05 RX ADMIN — SODIUM CHLORIDE, PRESERVATIVE FREE 10 ML: 5 INJECTION INTRAVENOUS at 06:32

## 2022-06-05 RX ADMIN — AMIODARONE HYDROCHLORIDE 1 MG/MIN: 50 INJECTION, SOLUTION INTRAVENOUS at 10:40

## 2022-06-05 RX ADMIN — MICONAZOLE NITRATE: 20 CREAM TOPICAL at 09:00

## 2022-06-05 RX ADMIN — IPRATROPIUM BROMIDE AND ALBUTEROL SULFATE 3 ML: .5; 3 SOLUTION RESPIRATORY (INHALATION) at 20:17

## 2022-06-05 RX ADMIN — SODIUM CHLORIDE 40 MG: 9 INJECTION INTRAMUSCULAR; INTRAVENOUS; SUBCUTANEOUS at 10:20

## 2022-06-05 RX ADMIN — IPRATROPIUM BROMIDE AND ALBUTEROL SULFATE 3 ML: .5; 3 SOLUTION RESPIRATORY (INHALATION) at 07:52

## 2022-06-05 RX ADMIN — PIPERACILLIN AND TAZOBACTAM 3.38 G: 3; .375 INJECTION, POWDER, LYOPHILIZED, FOR SOLUTION INTRAVENOUS at 21:53

## 2022-06-05 RX ADMIN — METHYLPREDNISOLONE SODIUM SUCCINATE 60 MG: 125 INJECTION, POWDER, FOR SOLUTION INTRAMUSCULAR; INTRAVENOUS at 10:20

## 2022-06-05 RX ADMIN — Medication 0.5 MG/MIN: at 17:33

## 2022-06-05 RX ADMIN — Medication: at 14:51

## 2022-06-05 RX ADMIN — HEPARIN SODIUM 7.1 UNITS/KG/HR: 10000 INJECTION, SOLUTION INTRAVENOUS at 15:35

## 2022-06-05 RX ADMIN — Medication: at 21:43

## 2022-06-05 RX ADMIN — HEPARIN SODIUM 4000 UNITS: 1000 INJECTION INTRAVENOUS; SUBCUTANEOUS at 11:38

## 2022-06-05 RX ADMIN — FUROSEMIDE 40 MG: 10 INJECTION, SOLUTION INTRAMUSCULAR; INTRAVENOUS at 17:30

## 2022-06-05 NOTE — PROGRESS NOTES
ICU Progress Note        Subjective: Sitting comfortably on the bed, denies any complaints. Vital Signs:    Visit Vitals  BP (!) 104/58 (BP 1 Location: Right lower arm, BP Patient Position: Supine)   Pulse 62   Temp 97.9 °F (36.6 °C)   Resp 21   Ht 5' 3\" (1.6 m)   Wt 140.5 kg (309 lb 12.8 oz)   SpO2 100%   BMI 54.88 kg/m²       O2 Device: BIPAP   O2 Flow Rate (L/min): 8 l/min   Temp (24hrs), Av.9 °F (36.6 °C), Min:97.3 °F (36.3 °C), Max:98.9 °F (37.2 °C)       Intake/Output:   Last shift:      No intake/output data recorded. Last 3 shifts:  1901 -  0700  In: 900 [P.O.:100; I.V.:800]  Out: 1020 [Urine:1020]    Intake/Output Summary (Last 24 hours) at 2022 1013  Last data filed at 2022 0649  Gross per 24 hour   Intake 200 ml   Output 470 ml   Net -270 ml       Ventilator Settings:  Ventilator Mode: NIPPV  Respiratory Rate  Back-Up Rate: 20  Insp Time (sec): 0.74 sec  I:E Ratio: 1:5.2  Ventilator Volumes  Vt Spont (ml): 772 ml  Ve Observed (l/min): 14.5 l/min  Ventilator Pressures  PC Set: 22  Pressure Support (cm H2O): 12 cm H2O  PIP Observed (cm H2O): 32 cm H2O  Plateau Pressure (cm H2O): 13 cm H2O  MAP (cm H2O): 14  PEEP/VENT (cm H2O): 6 cm H20  Auto PEEP Observed (cm H2O): 8.1 cm H2O    Physical Exam:    General: Alert, awake and oriented.  Not in acute distress  HEENT:  Anicteric sclerae; pink palpebral conjunctivae; mucosa moist. BiPAP  Resp:  Bilateral air entry +, no crackles or wheeze  CV:  S1, S2 present  GI:  Abdomen soft, non-tender; (+) active bowel sounds  Extremities:  +2 pulses on all extremities; no edema/ cyanosis/ clubbing noted  Skin:  Warm; no rashes/ lesions noted  Neurologic:  Non-focal    DATA:     Current Facility-Administered Medications   Medication Dose Route Frequency    sodium chloride (NS) flush 5-40 mL  5-40 mL IntraVENous Q8H    sodium chloride (NS) flush 5-40 mL  5-40 mL IntraVENous PRN    lidocaine (PF) (XYLOCAINE) 10 mg/mL (1 %) injection 0.1 mL  0.1 mL SubCUTAneous PRN    traMADoL (ULTRAM) tablet 50 mg  50 mg Oral Q6H PRN    VANCOMYCIN INFORMATION NOTE   Other Rx Dosing/Monitoring    methylPREDNISolone (PF) (Solu-MEDROL) injection 60 mg  60 mg IntraVENous DAILY    sodium chloride (NS) flush 5-40 mL  5-40 mL IntraVENous Q8H    sodium chloride (NS) flush 5-40 mL  5-40 mL IntraVENous PRN    sucralfate (CARAFATE) tablet 1 g  1 g Oral TIDAC    pantoprazole (PROTONIX) 40 mg in 0.9% sodium chloride 10 mL injection  40 mg IntraVENous Q12H    piperacillin-tazobactam (ZOSYN) 3.375 g in 0.9% sodium chloride (MBP/ADV) 100 mL MBP  3.375 g IntraVENous Q8H    0.9% sodium chloride infusion 250 mL  250 mL IntraVENous PRN    albuterol-ipratropium (DUO-NEB) 2.5 MG-0.5 MG/3 ML  3 mL Nebulization BID    metoprolol tartrate (LOPRESSOR) tablet 25 mg  25 mg Oral BID    glucose chewable tablet 16 g  4 Tablet Oral PRN    dextrose 10% infusion 0-250 mL  0-250 mL IntraVENous PRN    glucagon (GLUCAGEN) injection 1 mg  1 mg IntraMUSCular PRN    insulin lispro (HUMALOG) injection   SubCUTAneous AC&HS    furosemide (LASIX) tablet 40 mg  40 mg Oral DAILY    ammonium lactate (LAC-HYDRIN) 12 % lotion   Topical BID    miconazole (SECURA) 2 % extra thick cream   Topical BID    lidocaine 4 % patch 2 Patch  2 Patch TransDERmal Q24H    diphenhydrAMINE (BENADRYL) capsule 25 mg  25 mg Oral Q6H PRN    acetaminophen (TYLENOL) tablet 650 mg  650 mg Oral Q6H PRN    Or    acetaminophen (TYLENOL) suppository 650 mg  650 mg Rectal Q6H PRN    polyethylene glycol (MIRALAX) packet 17 g  17 g Oral DAILY PRN    ondansetron (ZOFRAN ODT) tablet 4 mg  4 mg Oral Q8H PRN    Or    ondansetron (ZOFRAN) injection 4 mg  4 mg IntraVENous Q6H PRN    amiodarone (CORDARONE) tablet 200 mg  200 mg Oral DAILY    gabapentin (NEURONTIN) capsule 100 mg  100 mg Oral TID    [Held by provider] rivaroxaban (XARELTO) tablet 20 mg  20 mg Oral DAILY WITH BREAKFAST         Labs: Results:       Chemistry Recent Labs     06/05/22  0259 06/04/22  0431 06/03/22  0357   * 106* 105*    144 143   K 4.0 3.6 3.7    106 105   CO2 35* 35* 35*   BUN 26* 19 19   CREA 0.83 0.68 0.47*   CA 8.2* 8.3* 8.2*   AGAP 2* 3* 3*   BUCR 31* 28* 40*      CBC w/Diff Recent Labs     06/05/22 0259 06/04/22 0431 06/03/22  0357   WBC 25.5* 21.9* 16.3*   RBC 2.84* 2.82* 2.77*   HGB 8.8* 8.7* 8.3*   HCT 31.4* 30.5* 29.1*    154 135*      Coagulation No results for input(s): PTP, INR, APTT, INREXT, INREXT in the last 72 hours. Liver Enzymes No results for input(s): TP, ALB, TBIL, AP in the last 72 hours. No lab exists for component: SGOT, GPT, DBIL   ABG Lab Results   Component Value Date/Time    PH 7.21 (LL) 06/05/2022 04:54 AM    PCO2 83 (H) 06/05/2022 04:54 AM    PO2 52 (L) 06/05/2022 04:54 AM    HCO3 33 (H) 06/05/2022 04:54 AM      Microbiology No results for input(s): CULT in the last 72 hours. maging:  CXR Results  (Last 48 hours)               06/04/22 1340  XR CHEST PORT Final result    Impression:  1. Left PICC crosses midline and terminates in the right brachiocephalic or IJV. 2. Cardiomegaly, pulmonary vascular congestion, probable interstitial edema. Narrative:  PORTABLE CHEST RADIOGRAPH/S: 6/4/2022 1:40 PM       INDICATION: Heart failure. COMPARISON: 6/2/2022, 5/23/2022, 5/3/2022. TECHNIQUE: Portable frontal semiupright radiograph/s of the chest.       FINDINGS:    A left PICC crosses midline and terminates in the right brachiocephalic or   internal jugular vein. There is pulmonary vascular congestion and probable   interstitial edema. The heart is enlarged, even given portable technique. The   central airways are patent. No pneumothorax. CT Results  (Last 48 hours)    None               Assessment and Plan:    The patient is a 61/F with medical history as below who was admitted on 05/23 for acute on chronic hypercapnic respiratory failure.  Initially admitted to ICU and then transferred to step down on 05/24 after treating with BiPAP. We have been re consulted for hypercapnia. Acute on chronic hypoxic and  hypercapnic respiratory failure - in the setting of obesity hypoventilation syndrome and likely sleep apnea. Acute decompensation (from hypercapnia perspective) due to loss of bicarb support (she was treated with diamox), acute hypoxia due to volume overload in the setting of a.fib (lasix was stopped). Cont. BiPAP, supplemental oxygen and diuresis. A.fib - On amiodarone and metoprolol which she has not received due to continuous BiPAP. I will change to IV amiodarone, hold BB. She was on Xarelto which was stopped due to Hb drop which has been now stable, I will start heparin gtt. Elevated WBC - no fever. On vancomycin and zosyn. Other medical problems  - Left hip fracture  - Left hip osteomyelitis  - Bilateral buttock wounds  - Steroid induced hyperglycemia  - Hypertension    CCM time - 40 minutes.      Luca Becerra MD, FCCP, ATSF, FACP, DAABIP  Interventional Pulmonology/Critical [address] [place] [address]

## 2022-06-05 NOTE — PROGRESS NOTES
Worsening ABG's this am compared to yesterday. Pt lethargic, pale, and clammy. Pt seen by intensivist and accepted to ICU. Pt transferred to ICU.  Bedside report given to Alexander, [address] [facility]

## 2022-06-05 NOTE — PROGRESS NOTES
Problem: Pressure Injury - Risk of  Goal: *Prevention of pressure injury  Description: Document Gaston Scale and appropriate interventions in the flowsheet. Outcome: Progressing Towards Goal  Note: Pressure Injury Interventions:  Sensory Interventions: Assess changes in LOC,Check visual cues for pain,Float heels,Keep linens dry and wrinkle-free,Maintain/enhance activity level,Minimize linen layers,Monitor skin under medical devices,Pad between skin to skin,Pressure redistribution bed/mattress (bed type),Turn and reposition approx. every two hours (pillows and wedges if needed)    Moisture Interventions: Absorbent underpads,Internal/External urinary devices,Limit adult briefs,Maintain skin hydration (lotion/cream),Minimize layers,Moisture barrier,Check for incontinence Q2 hours and as needed    Activity Interventions: Pressure redistribution bed/mattress(bed type),PT/OT evaluation,Chair cushion    Mobility Interventions: HOB 30 degrees or less,Pressure redistribution bed/mattress (bed type),Turn and reposition approx. every two hours(pillow and wedges),Float heels    Nutrition Interventions: Document food/fluid/supplement intake,Offer support with meals,snacks and hydration,Discuss nutritional consult with provider    Friction and Shear Interventions: HOB 30 degrees or less,Lift sheet,Minimize layers,Foam dressings/transparent film/skin sealants,Apply protective barrier, creams and emollients                Problem: Falls - Risk of  Goal: *Absence of Falls  Description: Document Anna Fall Risk and appropriate interventions in the flowsheet.   Outcome: Progressing Towards Goal  Note: Fall Risk Interventions:  Mobility Interventions: Bed/chair exit alarm,Communicate number of staff needed for ambulation/transfer    Mentation Interventions: Door open when patient unattended,Bed/chair exit alarm,Evaluate medications/consider consulting pharmacy,Increase mobility,More frequent rounding,Reorient patient,Toileting rounds,Update white board    Medication Interventions: Bed/chair exit alarm,Evaluate medications/consider consulting pharmacy,Patient to call before getting OOB    Elimination Interventions: Bed/chair exit alarm,Call light in reach,Toileting schedule/hourly rounds              Problem: Infection - Risk of, Central Venous Catheter-Associated Bloodstream Infection  Goal: *Absence of infection signs and symptoms  Outcome: Progressing Towards Goal

## 2022-06-05 NOTE — PROGRESS NOTES
ADULT PROTOCOL: JET AEROSOL  REASSESSMENT    Patient  Avelino Hendricks     57 y.o.   female     6/4/2022  10:48 PM    Breath Sounds Pre Procedure: Right Breath Sounds: Diminished                               Left Breath Sounds: Diminished    Breath Sounds Post Procedure: Right Breath Sounds: Clear,Diminished                                 Left Breath Sounds: Diminished    Breathing pattern: Pre procedure Breathing Pattern: Regular          Post procedure Breathing Pattern: Regular    Heart Rate: Pre procedure Pulse: 69           Post procedure Pulse: 69    Resp Rate: Pre procedure Respirations: 21           Post procedure Respirations: 20    Peak Flow: Pre bronchodilator             Post bronchodilator       FVC/FEV1:  n/a    Incentive Spirometry:             Cough: Pre procedure Cough: Non-productive               Post procedure Cough: Non-productive    Suctioned: NO    Sputum: Pre procedure                   Post procedure      Oxygen: O2 Device: BIPAP (Resmed)   Flow rate (L/min) 10LPM     Changed: NO    SpO2: Pre procedure SpO2: 94 %   with oxygen              Post procedure SpO2: 96 %  with oxygen    Nebulizer Therapy: Current medications Aerosolized Medications: DuoNeb      Changed: NO    Smoking History: n/a      Problem List:   Patient Active Problem List   Diagnosis Code    COVID-19 ruled out Z20.822    A-fib ([address] I48.91    S/P cardiac cath Z98.890    Mixed hyperlipidemia E78.2    Morbid obesity ([address] E66.01    Acute respiratory failure ([address] J96.00    Atrial flutter with rapid ventricular response ([address] I48.92    JOSLYN (obstructive sleep apnea) G47.33    Acute on chronic respiratory failure with hypoxia and hypercapnia (HCC) J96.21, J96.22    SOB (shortness of breath) R06.02    Physical deconditioning R53.81    Counseling regarding advance care planning and goals of care Z71.89    Chronic pain G89.29    Dependence on supplemental oxygen Z99.81    Essential hypertension I10    Heart failure (HCC) I50.9    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

## 2022-06-05 NOTE — PROGRESS NOTES
Confirmed with Nursing Supervisor Jamir Castillo that pt is able to stay on PCU despite being on a full face mask with BIPAP settings. Pt is using the new ResMed BIPAP system which has been cleared to use on PCU.

## 2022-06-05 NOTE — PROGRESS NOTES
End of Shift Note    Bedside shift change report given to Mery (oncoming nurse) by Nafisa Kim (offgoing nurse). Report included the following information SBAR, Kardex, Procedure Summary, Intake/Output, MAR, Recent Results and Cardiac Rhythm NSR    Shift worked:  7 am to 7 pm     Shift summary and any significant changes:    Pt complained of bilateral leg pain; pain meds given. Nausea meds given earlier in shift. Increase in oxygen needs. Decline in mentation during shift; MD informed. ABG, CXR, troponin, and EKG completed. Pt changed to BIPAP on trilogy. (see previous progress notes). Rapid COVID sent. Wound care completed. Concerns for physician to address:  none     Zone phone for oncoming shift:   [phone]       Activity:  Activity Level: Bed Rest  Number times ambulated in hallways past shift: 0  Number of times OOB to chair past shift: 0    Cardiac:   Cardiac Monitoring: Yes      Cardiac Rhythm: Sinus Rhythm    Access:   Current line(s): PIV and PICC     Genitourinary:   Urinary status: voiding and external catheter    Respiratory:   O2 Device: BIPAP (Resmed)  Chronic home O2 use?: YES  Incentive spirometer at bedside:        GI:  Last Bowel Movement Date: 06/04/22  Current diet:  ADULT DIET Dysphagia - Soft & Bite Sized  Passing flatus: YES  Tolerating current diet: pt complained of nausea; hadn't had much of appetite       Pain Management:   Patient states pain is manageable on current regimen: YES    Skin:  Gaston Score: 13  Interventions: turn team, speciality bed, float heels, limit briefs and internal/external urinary devices    Patient Safety:  Fall Score:  Total Score: 4  Interventions: bed/chair alarm and pt to call before getting OOB  High Fall Risk: Yes    Length of Stay:  Expected LOS: 3d 14h  Actual LOS: [address] [facility]

## 2022-06-05 NOTE — PROGRESS NOTES
7746- Verbal shift change report given to Sonido Patel RN  (oncoming nurse) by Zach Cramer RN (offgoing nurse). Report included the following information SBAR, Procedure Summary, Intake/Output, MAR, Recent Results, Med Rec Status, Cardiac Rhythm NSR and Alarm Parameters      0900- Change in Rhythm to Afib RVR. New orders obtained. 1100- Resting in bed with changes noted. 1300- IV line drsg change. QuickClot applied for bleeding at site. Flushes well with good blood return. 1500- Alert follows commands. PureWick intact working well. 1750- Removed BiPap to try and eat. SPO2 dropped to 73%. Placed directly back on and will remain NPO for now. 1910- Verbal shift change report given to [employer] [facility] (oncoming nurse) by Sonido Patel RN (offgoing nurse). Report included the following information SBAR, Recent Results, Med Rec Status, Cardiac Rhythm Afib  and Alarm Parameters .

## 2022-06-05 NOTE — PROGRESS NOTES
Pharmacy Antimicrobial Kinetic Dosing    Indication for Antimicrobials: Bone & Joint Infection    Current Regimen of Each Antimicrobial:  Vancomycin Pharmacy to Dose (Start Date ; Day 5 (through )  Zosyn 3.375 g IV q8h - through     Previous Antimicrobial Therapy:  Daptomycin     Goal Level: AUC: 400-600 mg/hr/Liter/day    Date Dose & Interval Measured (mcg/mL) Predicted AUC/SUDHA   6/3/22 1500 mg Q12H 37.1, 45.8 (random) > 600    held 21.6 N/a             Dosing calculator used: [employer] calculator    Significant Positive Cultures:    blood: ng, final    Conditions for Dosing Consideration: None    Labs:  Recent Labs     22  0259 22  0431 22  0357   CREA 0.83 0.68 0.47*   BUN 26* 19 19     Recent Labs     22  0259 22  0431 22  0357   WBC 25.5* 21.9* 16.3*     Temp (24hrs), Av.9 °F (36.6 °C), Min:97.3 °F (36.3 °C), Max:98.9 °F (37.2 °C)      Creatinine Clearance (mL/min):   CrCl (Ideal Body Weight): 58.1      Impression/Plan:   Scr rising, almost doubled over last 2 days  Vancomycin level above goal range, continue to hold  Recheck level tomorrow AM   Antimicrobial stop date:      Pharmacy will follow daily and adjust medications as appropriate for renal function and/or serum levels.     Thank you,  HIRAL oMreno

## 2022-06-06 LAB
ALBUMIN SERPL-MCNC: 2.2 G/DL (ref 3.5–5)
ALBUMIN/GLOB SERPL: 0.7 {RATIO} (ref 1.1–2.2)
ALP SERPL-CCNC: 224 U/L (ref 45–117)
ALT SERPL-CCNC: 40 U/L (ref 12–78)
ANION GAP SERPL CALC-SCNC: 9 MMOL/L (ref 5–15)
APTT PPP: 48 SEC (ref 22.1–31)
ARTERIAL PATENCY WRIST A: YES
AST SERPL-CCNC: 24 U/L (ref 15–37)
ATRIAL RATE: 102 BPM
BASE EXCESS BLDA CALC-SCNC: 8.1 MMOL/L
BDY SITE: ABNORMAL
BILIRUB SERPL-MCNC: 0.7 MG/DL (ref 0.2–1)
BUN SERPL-MCNC: 29 MG/DL (ref 6–20)
BUN/CREAT SERPL: 35 (ref 12–20)
CALCIUM SERPL-MCNC: 8.3 MG/DL (ref 8.5–10.1)
CALCULATED R AXIS, ECG10: 56 DEGREES
CALCULATED T AXIS, ECG11: 74 DEGREES
CHLORIDE SERPL-SCNC: 104 MMOL/L (ref 97–108)
CO2 SERPL-SCNC: 31 MMOL/L (ref 21–32)
CREAT SERPL-MCNC: 0.82 MG/DL (ref 0.55–1.02)
DIAGNOSIS, 93000: NORMAL
GAS FLOW.O2 O2 DELIVERY SYS: 4 L/MIN
GLOBULIN SER CALC-MCNC: 3.1 G/DL (ref 2–4)
GLUCOSE BLD STRIP.AUTO-MCNC: 140 MG/DL (ref 65–117)
GLUCOSE BLD STRIP.AUTO-MCNC: 151 MG/DL (ref 65–117)
GLUCOSE BLD STRIP.AUTO-MCNC: 197 MG/DL (ref 65–117)
GLUCOSE BLD STRIP.AUTO-MCNC: 212 MG/DL (ref 65–117)
GLUCOSE SERPL-MCNC: 165 MG/DL (ref 65–100)
HCO3 BLDA-SCNC: 36 MMOL/L (ref 22–26)
MAGNESIUM SERPL-MCNC: 2.3 MG/DL (ref 1.6–2.4)
PCO2 BLDA: 62 MMHG (ref 35–45)
PH BLDA: 7.38 [PH] (ref 7.35–7.45)
PHOSPHATE SERPL-MCNC: 2.9 MG/DL (ref 2.6–4.7)
PO2 BLDA: 55 MMHG (ref 80–100)
POTASSIUM SERPL-SCNC: 3.3 MMOL/L (ref 3.5–5.1)
PROT SERPL-MCNC: 5.3 G/DL (ref 6.4–8.2)
Q-T INTERVAL, ECG07: 330 MS
QRS DURATION, ECG06: 74 MS
QTC CALCULATION (BEZET), ECG08: 487 MS
SAO2 % BLD: 87 % (ref 92–97)
SAO2% DEVICE SAO2% SENSOR NAME: ABNORMAL
SERVICE CMNT-IMP: ABNORMAL
SODIUM SERPL-SCNC: 144 MMOL/L (ref 136–145)
SPECIMEN SITE: ABNORMAL
THERAPEUTIC RANGE,PTTT: ABNORMAL SECS (ref 58–77)
VANCOMYCIN SERPL-MCNC: 17.5 UG/ML
VENTRICULAR RATE, ECG03: 131 BPM

## 2022-06-06 PROCEDURE — C9113 INJ PANTOPRAZOLE SODIUM, VIA: HCPCS | Performed by: STUDENT IN AN ORGANIZED HEALTH CARE EDUCATION/TRAINING PROGRAM

## 2022-06-06 PROCEDURE — 74011250636 HC RX REV CODE- 250/636: Performed by: INTERNAL MEDICINE

## 2022-06-06 PROCEDURE — 36600 WITHDRAWAL OF ARTERIAL BLOOD: CPT

## 2022-06-06 PROCEDURE — 74011250637 HC RX REV CODE- 250/637: Performed by: NURSE PRACTITIONER

## 2022-06-06 PROCEDURE — 84100 ASSAY OF PHOSPHORUS: CPT

## 2022-06-06 PROCEDURE — 74011000250 HC RX REV CODE- 250: Performed by: INTERNAL MEDICINE

## 2022-06-06 PROCEDURE — 74011000258 HC RX REV CODE- 258: Performed by: INTERNAL MEDICINE

## 2022-06-06 PROCEDURE — 74011000250 HC RX REV CODE- 250: Performed by: HOSPITALIST

## 2022-06-06 PROCEDURE — 94640 AIRWAY INHALATION TREATMENT: CPT

## 2022-06-06 PROCEDURE — 74011000258 HC RX REV CODE- 258: Performed by: STUDENT IN AN ORGANIZED HEALTH CARE EDUCATION/TRAINING PROGRAM

## 2022-06-06 PROCEDURE — 74011250636 HC RX REV CODE- 250/636: Performed by: STUDENT IN AN ORGANIZED HEALTH CARE EDUCATION/TRAINING PROGRAM

## 2022-06-06 PROCEDURE — 74011250637 HC RX REV CODE- 250/637: Performed by: INTERNAL MEDICINE

## 2022-06-06 PROCEDURE — 36415 COLL VENOUS BLD VENIPUNCTURE: CPT

## 2022-06-06 PROCEDURE — 74011636637 HC RX REV CODE- 636/637: Performed by: HOSPITALIST

## 2022-06-06 PROCEDURE — 82962 GLUCOSE BLOOD TEST: CPT

## 2022-06-06 PROCEDURE — 99232 SBSQ HOSP IP/OBS MODERATE 35: CPT | Performed by: STUDENT IN AN ORGANIZED HEALTH CARE EDUCATION/TRAINING PROGRAM

## 2022-06-06 PROCEDURE — 74011000250 HC RX REV CODE- 250: Performed by: STUDENT IN AN ORGANIZED HEALTH CARE EDUCATION/TRAINING PROGRAM

## 2022-06-06 PROCEDURE — 80202 ASSAY OF VANCOMYCIN: CPT

## 2022-06-06 PROCEDURE — 74011000250 HC RX REV CODE- 250: Performed by: ANESTHESIOLOGY

## 2022-06-06 PROCEDURE — 74011250637 HC RX REV CODE- 250/637: Performed by: HOSPITALIST

## 2022-06-06 PROCEDURE — 94660 CPAP INITIATION&MGMT: CPT

## 2022-06-06 PROCEDURE — 77010033678 HC OXYGEN DAILY

## 2022-06-06 PROCEDURE — 82803 BLOOD GASES ANY COMBINATION: CPT

## 2022-06-06 PROCEDURE — 83735 ASSAY OF MAGNESIUM: CPT

## 2022-06-06 PROCEDURE — 85730 THROMBOPLASTIN TIME PARTIAL: CPT

## 2022-06-06 PROCEDURE — 80053 COMPREHEN METABOLIC PANEL: CPT

## 2022-06-06 PROCEDURE — 65270000046 HC RM TELEMETRY

## 2022-06-06 RX ORDER — PANTOPRAZOLE SODIUM 40 MG/1
40 TABLET, DELAYED RELEASE ORAL
Status: DISCONTINUED | OUTPATIENT
Start: 2022-06-07 | End: 2022-06-17 | Stop reason: HOSPADM

## 2022-06-06 RX ORDER — POTASSIUM CHLORIDE 20 MEQ/1
40 TABLET, EXTENDED RELEASE ORAL 2 TIMES DAILY
Status: COMPLETED | OUTPATIENT
Start: 2022-06-06 | End: 2022-06-06

## 2022-06-06 RX ORDER — AMIODARONE HYDROCHLORIDE 200 MG/1
200 TABLET ORAL DAILY
Status: DISCONTINUED | OUTPATIENT
Start: 2022-06-06 | End: 2022-06-17 | Stop reason: HOSPADM

## 2022-06-06 RX ORDER — TRAMADOL HYDROCHLORIDE 50 MG/1
50 TABLET ORAL ONCE
Status: COMPLETED | OUTPATIENT
Start: 2022-06-06 | End: 2022-06-06

## 2022-06-06 RX ADMIN — MICONAZOLE NITRATE: 20 CREAM TOPICAL at 08:51

## 2022-06-06 RX ADMIN — Medication 3 UNITS: at 12:48

## 2022-06-06 RX ADMIN — RIVAROXABAN 20 MG: 20 TABLET, FILM COATED ORAL at 12:23

## 2022-06-06 RX ADMIN — GABAPENTIN 100 MG: 100 CAPSULE ORAL at 21:35

## 2022-06-06 RX ADMIN — HEPARIN SODIUM 2000 UNITS: 1000 INJECTION INTRAVENOUS; SUBCUTANEOUS at 06:08

## 2022-06-06 RX ADMIN — IPRATROPIUM BROMIDE AND ALBUTEROL SULFATE 3 ML: .5; 3 SOLUTION RESPIRATORY (INHALATION) at 07:56

## 2022-06-06 RX ADMIN — SUCRALFATE 1 G: 1 TABLET ORAL at 12:23

## 2022-06-06 RX ADMIN — PIPERACILLIN AND TAZOBACTAM 3.38 G: 3; .375 INJECTION, POWDER, LYOPHILIZED, FOR SOLUTION INTRAVENOUS at 14:42

## 2022-06-06 RX ADMIN — SUCRALFATE 1 G: 1 TABLET ORAL at 08:40

## 2022-06-06 RX ADMIN — SODIUM CHLORIDE, PRESERVATIVE FREE 10 ML: 5 INJECTION INTRAVENOUS at 21:35

## 2022-06-06 RX ADMIN — GABAPENTIN 100 MG: 100 CAPSULE ORAL at 08:40

## 2022-06-06 RX ADMIN — POTASSIUM CHLORIDE 40 MEQ: 20 TABLET, EXTENDED RELEASE ORAL at 12:23

## 2022-06-06 RX ADMIN — Medication 2 UNITS: at 08:51

## 2022-06-06 RX ADMIN — IPRATROPIUM BROMIDE AND ALBUTEROL SULFATE 3 ML: .5; 3 SOLUTION RESPIRATORY (INHALATION) at 20:32

## 2022-06-06 RX ADMIN — SUCRALFATE 1 G: 1 TABLET ORAL at 17:02

## 2022-06-06 RX ADMIN — Medication 2 UNITS: at 17:02

## 2022-06-06 RX ADMIN — FUROSEMIDE 40 MG: 10 INJECTION, SOLUTION INTRAMUSCULAR; INTRAVENOUS at 17:45

## 2022-06-06 RX ADMIN — MICONAZOLE NITRATE: 20 CREAM TOPICAL at 21:00

## 2022-06-06 RX ADMIN — VANCOMYCIN HYDROCHLORIDE 750 MG: 750 INJECTION, POWDER, LYOPHILIZED, FOR SOLUTION INTRAVENOUS at 12:38

## 2022-06-06 RX ADMIN — PIPERACILLIN AND TAZOBACTAM 3.38 G: 3; .375 INJECTION, POWDER, LYOPHILIZED, FOR SOLUTION INTRAVENOUS at 21:35

## 2022-06-06 RX ADMIN — SODIUM CHLORIDE, PRESERVATIVE FREE 10 ML: 5 INJECTION INTRAVENOUS at 16:52

## 2022-06-06 RX ADMIN — FUROSEMIDE 40 MG: 10 INJECTION, SOLUTION INTRAMUSCULAR; INTRAVENOUS at 08:49

## 2022-06-06 RX ADMIN — SODIUM CHLORIDE, PRESERVATIVE FREE 10 ML: 5 INJECTION INTRAVENOUS at 14:42

## 2022-06-06 RX ADMIN — SODIUM CHLORIDE, PRESERVATIVE FREE 10 ML: 5 INJECTION INTRAVENOUS at 06:00

## 2022-06-06 RX ADMIN — AMIODARONE HYDROCHLORIDE 200 MG: 200 TABLET ORAL at 12:23

## 2022-06-06 RX ADMIN — GABAPENTIN 100 MG: 100 CAPSULE ORAL at 17:02

## 2022-06-06 RX ADMIN — Medication 0.5 MG/MIN: at 06:23

## 2022-06-06 RX ADMIN — PIPERACILLIN AND TAZOBACTAM 3.38 G: 3; .375 INJECTION, POWDER, LYOPHILIZED, FOR SOLUTION INTRAVENOUS at 05:59

## 2022-06-06 RX ADMIN — SODIUM CHLORIDE 40 MG: 9 INJECTION INTRAMUSCULAR; INTRAVENOUS; SUBCUTANEOUS at 08:43

## 2022-06-06 RX ADMIN — SODIUM CHLORIDE, PRESERVATIVE FREE 10 ML: 5 INJECTION INTRAVENOUS at 21:36

## 2022-06-06 RX ADMIN — POTASSIUM CHLORIDE 40 MEQ: 20 TABLET, EXTENDED RELEASE ORAL at 17:45

## 2022-06-06 RX ADMIN — Medication: at 17:14

## 2022-06-06 RX ADMIN — TRAMADOL HYDROCHLORIDE 50 MG: 50 TABLET ORAL at 21:35

## 2022-06-06 RX ADMIN — Medication: at 08:51

## 2022-06-06 NOTE — PROGRESS NOTES
Pharmacy Antimicrobial Kinetic Dosing    Indication for Antimicrobials: Bone & Joint Infection    Current Regimen of Each Antimicrobial:  Vancomycin Pharmacy to Dose (Start Date ; Day 5 (through )  Zosyn 3.375 g IV q8h - through     Previous Antimicrobial Therapy:  Daptomycin     Goal Level: AUC: 400-600 mg/hr/Liter/day    Date Dose & Interval Measured (mcg/mL) Predicted AUC/SUDHA   6/3/22 1500 mg Q12H 37.1, 45.8 (random) > 600    held 21.6 N/a    held 17.5 N/a       Dosing calculator used: [employer] calculator    Significant Positive Cultures:    blood: ng, final    Conditions for Dosing Consideration: None    Labs:  Recent Labs     22  0353 22  0259 22  0431   CREA 0.82 0.83 0.68   BUN 29* 26* 19     Recent Labs     22  1148 22  0259 22  0431   WBC 20.1* 25.5* 21.9*     Temp (24hrs), Av.1 °F (36.7 °C), Min:97.9 °F (36.6 °C), Max:98.4 °F (36.9 °C)      Creatinine Clearance (mL/min):   CrCl (Ideal Body Weight): 58.8      Impression/Plan:   Scr elevated, almost doubled from baseline, but stable from yesterday  Vancomycin level within goal range today, but very slow clearance  Ke 0.008, t1/2 ~82 hr  Will give 750 mg vancomycin today  Recheck level 6/8 AM  Check cystatin C  May need to adjust zosyn, xarelto doses  Antimicrobial stop date:      Pharmacy will follow daily and adjust medications as appropriate for renal function and/or serum levels.     Thank you,  HIRAL Moore

## 2022-06-06 NOTE — PROGRESS NOTES
Music Therapy Assessment  [address] [facility] [id number]     1960  57 y.o.  female    Patient Telephone Number: [phone] (home)   Spiritism Affiliation: [facility]   Language: English   Patient Active Problem List    Diagnosis Date Noted    Anemia 06/02/2022    Pain in left hip 05/06/2022    Hip osteomyelitis ([address] 05/04/2022    Chronic pain 04/21/2022    Dependence on supplemental oxygen 04/21/2022    Essential hypertension 04/21/2022    Heart failure ([address] 04/21/2022    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    Sciatica 04/21/2022    Hypoxia 04/21/2022    COPD exacerbation ([address] 04/21/2022    CHF exacerbation ([address] 04/21/2022    SOB (shortness of breath) 12/04/2020    Physical deconditioning 12/04/2020    Counseling regarding advance care planning and goals of care 12/04/2020    Atrial flutter with rapid ventricular response ([address] 11/20/2020    JOSLYN (obstructive sleep apnea) 11/20/2020    Acute on chronic respiratory failure with hypoxia and hypercapnia ([address] 11/20/2020    Acute respiratory failure ([address] 11/18/2020    Mixed hyperlipidemia 11/15/2020    Morbid obesity ([address] 11/15/2020    A-fib ([address] 11/04/2020    S/P cardiac cath 11/04/2020    COVID-19 ruled out 07/24/2020        Date: 6/6/2022            Total Time (in minutes): 5          MRM 2 CRITICAL CARE 2    Mental Status:   [x] Alert [  ] Nicholos Loomis [  ]  Confused  [  ] Minimally responsive  [  ] Sleeping    Communication Status: [  ] Impaired Speech [  ] Nonverbal -N/A    Physical Status:   [x] Oxygen in use  [  ] Hard of Hearing [  ] Vision Impaired  [  ] Ambulatory  [  ] Ambulatory with assistance [  ] Non-ambulatory     Music Preferences, Background: Pt enjoys Country and [place].  Pt enjoys artists like "[employer]", [employer], Sagrario Byrd, and [employer]     Clinical Problem to be addressed: Support relaxation    Goal(s) met in session: N/A: Please See Session Observations Below   Physical/Pain management (Scale of 1-10):    Pre-session rating ___________    Post-session rating __________  [  ] Increased relaxation   [  ] Affected breathing patterns  [  ] Decreased muscle tension   [  ] Decreased agitation  [  ] Affected heart rate    [  ] Increased alertness     Emotional/Psychological:  [  ] Increased self-expression   [  ] Decreased aggressive behavior   [  ] Decreased feelings of stress  [  ] Discussed healthy coping skills     [  ] Improved mood    [  ] Decreased withdrawn behavior     Social:  [  ] Decreased feelings of isolation/loneliness [  ] Positive social interaction   [  ] Provided support and/or comfort for family/friends    Spiritual:  [  ] Spiritual support    [  ] Expressed peace  [  ] Expressed rosalina    [  ] Discussed beliefs    Techniques Utilized (Check all that apply): N/A: Please See Session Observations Below   [  ] Procedural support MT [  ] Music for relaxation [  ] Patient preferred music  [  ] Rema analysis  [  ] Song choice  [  ] Music for validation  [  ] Entrainment  [  ] Movement to music [  ] Guided visualization  [  ] Darlean Simmonds  [  ] Patient instrument playing [  ] Monda Degree writing  [  ] Garth barker   [  ] Kelly Pope  [  ] Sensory stimulation  [  ] Active Listening  [  ] Music for spiritual support [  ] Making of CDs as gifts    Session Observations:  F/up visit; Patient (pt) was lying in bed with a respiratory therapist charting at bedside. This music therapist (MT) consulted with the respiratory therapist, asking if it was an appropriate time to offer music therapy services. She confirmed this, entered the room, and asked the pt if she would like music at this time. Pt declined this, and MT expressed understanding. MT exited.     LISANDRA Brennan (Music Therapist, Board Certified)  [id number] [facility]

## 2022-06-06 NOTE — PROGRESS NOTES
ICU Progress Note        Subjective: Sitting comfortably on the bed, denies any complaints. Vital Signs:    Visit Vitals  BP 94/81   Pulse 99   Temp 98.4 °F (36.9 °C)   Resp 15   Ht 5' 3\" (1.6 m)   Wt 140 kg (308 lb 10.3 oz)   SpO2 (!) 86%   BMI 54.67 kg/m²       O2 Device: Nasal cannula   O2 Flow Rate (L/min): 4 l/min   Temp (24hrs), Av.1 °F (36.7 °C), Min:97.9 °F (36.6 °C), Max:98.4 °F (36.9 °C)       Intake/Output:   Last shift:       07 -  1900  In: 755.3 [I.V.:755.3]  Out: -   Last 3 shifts:  190 -  0700  In: 419.7 [I.V.:419.7]  Out:  [Urine:]    Intake/Output Summary (Last 24 hours) at 2022 1027  Last data filed at 2022 0830  Gross per 24 hour   Intake 974.94 ml   Output 1600 ml   Net -625.06 ml       Ventilator Settings:  Ventilator Mode: NIPPV  Respiratory Rate  Back-Up Rate: 20  Insp Time (sec): 0.74 sec  I:E Ratio: 1:5.2  Ventilator Volumes  Vt Spont (ml): 330 ml  Ve Observed (l/min): 7.5 l/min  Ventilator Pressures  PC Set: 15  Pressure Support (cm H2O): 12 cm H2O  PIP Observed (cm H2O): 20 cm H2O  Plateau Pressure (cm H2O): 13 cm H2O  MAP (cm H2O): 9  PEEP/VENT (cm H2O): 5 cm H20  Auto PEEP Observed (cm H2O): 6.9 cm H2O    Physical Exam:    General: Alert, awake and oriented.  Not in acute distress  HEENT:  Anicteric sclerae; pink palpebral conjunctivae; mucosa moist.   Resp:  Bilateral air entry +, no crackles or wheeze  CV:  S1, S2 present  GI:  Abdomen soft, non-tender; (+) active bowel sounds  Extremities:  +2 pulses on all extremities; no edema/ cyanosis/ clubbing noted  Skin:  Warm; no rashes/ lesions noted  Neurologic:  Non-focal    DATA:     Current Facility-Administered Medications   Medication Dose Route Frequency    amiodarone (CORDARONE) 375 mg/250 mL D5W infusion  0.5 mg/min IntraVENous CONTINUOUS    furosemide (LASIX) injection 40 mg  40 mg IntraVENous BID    heparin (porcine) 1,000 unit/mL injection 2,000 Units  2,000 Units IntraVENous PRN    Or    heparin (porcine) 1,000 unit/mL injection 4,000 Units  4,000 Units IntraVENous PRN    heparin 25,000 units in D5W 250 ml infusion  7.1-25 Units/kg/hr IntraVENous TITRATE    sodium chloride (NS) flush 5-40 mL  5-40 mL IntraVENous Q8H    sodium chloride (NS) flush 5-40 mL  5-40 mL IntraVENous PRN    lidocaine (PF) (XYLOCAINE) 10 mg/mL (1 %) injection 0.1 mL  0.1 mL SubCUTAneous PRN    traMADoL (ULTRAM) tablet 50 mg  50 mg Oral Q6H PRN    VANCOMYCIN INFORMATION NOTE   Other Rx Dosing/Monitoring    sodium chloride (NS) flush 5-40 mL  5-40 mL IntraVENous Q8H    sodium chloride (NS) flush 5-40 mL  5-40 mL IntraVENous PRN    sucralfate (CARAFATE) tablet 1 g  1 g Oral TIDAC    pantoprazole (PROTONIX) 40 mg in 0.9% sodium chloride 10 mL injection  40 mg IntraVENous Q12H    piperacillin-tazobactam (ZOSYN) 3.375 g in 0.9% sodium chloride (MBP/ADV) 100 mL MBP  3.375 g IntraVENous Q8H    0.9% sodium chloride infusion 250 mL  250 mL IntraVENous PRN    albuterol-ipratropium (DUO-NEB) 2.5 MG-0.5 MG/3 ML  3 mL Nebulization BID    [Held by provider] metoprolol tartrate (LOPRESSOR) tablet 25 mg  25 mg Oral BID    glucose chewable tablet 16 g  4 Tablet Oral PRN    dextrose 10% infusion 0-250 mL  0-250 mL IntraVENous PRN    glucagon (GLUCAGEN) injection 1 mg  1 mg IntraMUSCular PRN    insulin lispro (HUMALOG) injection   SubCUTAneous AC&HS    ammonium lactate (LAC-HYDRIN) 12 % lotion   Topical BID    miconazole (SECURA) 2 % extra thick cream   Topical BID    lidocaine 4 % patch 2 Patch  2 Patch TransDERmal Q24H    diphenhydrAMINE (BENADRYL) capsule 25 mg  25 mg Oral Q6H PRN    acetaminophen (TYLENOL) tablet 650 mg  650 mg Oral Q6H PRN    Or    acetaminophen (TYLENOL) suppository 650 mg  650 mg Rectal Q6H PRN    polyethylene glycol (MIRALAX) packet 17 g  17 g Oral DAILY PRN    ondansetron (ZOFRAN ODT) tablet 4 mg  4 mg Oral Q8H PRN    Or    ondansetron (ZOFRAN) injection 4 mg  4 mg IntraVENous Q6H PRN    gabapentin (NEURONTIN) capsule 100 mg  100 mg Oral TID    [Held by provider] rivaroxaban (XARELTO) tablet 20 mg  20 mg Oral DAILY WITH BREAKFAST         Labs: Results:       Chemistry Recent Labs     06/06/22  0353 06/05/22  0259 06/04/22  0431   * 179* 106*    143 144   K 3.3* 4.0 3.6    106 106   CO2 31 35* 35*   BUN 29* 26* 19   CREA 0.82 0.83 0.68   CA 8.3* 8.2* 8.3*   AGAP 9 2* 3*   BUCR 35* 31* 28*   *  --   --    TP 5.3*  --   --    ALB 2.2*  --   --    GLOB 3.1  --   --    AGRAT 0.7*  --   --       CBC w/Diff Recent Labs     06/05/22  1148 06/05/22  0259 06/04/22  0431   WBC 20.1* 25.5* 21.9*   RBC 2.67* 2.84* 2.82*   HGB 8.1* 8.8* 8.7*   HCT 28.6* 31.4* 30.5*   * 168 154   GRANS 90*  --   --    LYMPH 3*  --   --    EOS 0  --   --       Coagulation Recent Labs     06/06/22  0353 06/05/22  1857   APTT 48.0* 63.5*       Liver Enzymes Recent Labs     06/06/22  0353   TP 5.3*   ALB 2.2*   *      ABG No results found for: PH, PHI, PCO2, PCO2I, PO2, PO2I, HCO3, HCO3I, FIO2, FIO2I   Microbiology No results for input(s): CULT in the last 72 hours. maging:  CXR Results  (Last 48 hours)               06/04/22 1340  XR CHEST PORT Final result    Impression:  1. Left PICC crosses midline and terminates in the right brachiocephalic or IJV. 2. Cardiomegaly, pulmonary vascular congestion, probable interstitial edema. Narrative:  PORTABLE CHEST RADIOGRAPH/S: 6/4/2022 1:40 PM       INDICATION: Heart failure. COMPARISON: 6/2/2022, 5/23/2022, 5/3/2022. TECHNIQUE: Portable frontal semiupright radiograph/s of the chest.       FINDINGS:    A left PICC crosses midline and terminates in the right brachiocephalic or   internal jugular vein. There is pulmonary vascular congestion and probable   interstitial edema. The heart is enlarged, even given portable technique. The   central airways are patent. No pneumothorax.                   CT Results  (Last 48 hours) None               Assessment and Plan:    The patient is a 61/F with medical history as below who was admitted on 05/23 for acute on chronic hypercapnic respiratory failure. Initially admitted to ICU and then transferred to step down on 05/24 after treating with BiPAP. We have been re consulted for hypercapnia. Acute on chronic hypoxic and  hypercapnic respiratory failure - in the setting of obesity hypoventilation syndrome and likely sleep apnea. Acute decompensation (from hypercapnia perspective) due to loss of bicarb support (she was treated with diamox), acute hypoxia due to volume overload in the setting of a.fib (lasix was stopped). Cont. BiPAP, supplemental oxygen and diuresis (lasix 40 mg IV bid). A.fib - Rate controlled, change amiodarone to PO. Change heparin to Xarelto. Hypokalemia - Replace K.     Elevated WBC - no fever. On vancomycin and zosyn. ID consult requested. Other medical problems  - Left hip fracture  - Left hip osteomyelitis  - Bilateral buttock wounds  - Steroid induced hyperglycemia  - Hypertension    CCM time - 40 minutes.      Minerva Hawk MD, FCCP, ATSF, FACP, DAABIP  Interventional Pulmonology/Critical [address]

## 2022-06-06 NOTE — ROUTINE PROCESS
0700-Bedside shift change report given to Brittaney Bhat (oncoming nurse) by Kin Seay (offgoing nurse). Report included the following information SBAR, Kardex and MAR.     0830- Patient assessed. See flowsheet. 1130- Patient bathed. Linens changed. [id number] [phone]- Reassessed. No changes. 1555- Reassessed. No changes. 1750- Attempted to call report to PCU nurse. 1800- Attempted to call report again. The accepting nurse is busy with a critical patient. She will call back when able. 200- Spoke to PCU charge nurse, Salazar Wise. They are unable to take the patient at this time. She would like me to call back for night shift at 7 pm.    1920- Report given to MARIE Palafox.

## 2022-06-06 NOTE — PROGRESS NOTES
Infectious Disease Progress Note         Interval:  In ICU for hypercapnic RF    Subjective:   Patient reports feeling well. On bipap. Objective:    Vitals:   Reviewed in chart.     Physical Exam:  Gen: No apparent distress  HEENT:  Normocephalic, atraumatic, no scleral icterus, no thrush  CV: HDS  Lungs: bipap  Abdomen: soft, non tender, non distended  Genitourinary:  no irwin catheter   Skin: no rash   Psych: good affect, good eye contact, non tearful  Neuro: alert, oriented to time,  place, and situation, moves all extremities to commands, verbal  Musculoskeletal:  No joint edema, erythema or tenderness noted   Lines: PIVs        Labs:  Recent Results (from the past 24 hour(s))   GLUCOSE, POC    Collection Time: 06/05/22  9:58 PM   Result Value Ref Range    Glucose (POC) 178 (H) 65 - 117 mg/dL    Performed by Ozzie Goyal (TIESHA RN)    VANCOMYCIN, RANDOM    Collection Time: 06/06/22  3:53 AM   Result Value Ref Range    Vancomycin, random 17.5 UG/ML   PTT    Collection Time: 06/06/22  3:53 AM   Result Value Ref Range    aPTT 48.0 (H) 22.1 - 31.0 sec    aPTT, therapeutic range     58.0 - 77.0 SECS   MAGNESIUM    Collection Time: 06/06/22  3:53 AM   Result Value Ref Range    Magnesium 2.3 1.6 - 2.4 mg/dL   PHOSPHORUS    Collection Time: 06/06/22  3:53 AM   Result Value Ref Range    Phosphorus 2.9 2.6 - 4.7 MG/DL   METABOLIC PANEL, COMPREHENSIVE    Collection Time: 06/06/22  3:53 AM   Result Value Ref Range    Sodium 144 136 - 145 mmol/L    Potassium 3.3 (L) 3.5 - 5.1 mmol/L    Chloride 104 97 - 108 mmol/L    CO2 31 21 - 32 mmol/L    Anion gap 9 5 - 15 mmol/L    Glucose 165 (H) 65 - 100 mg/dL    BUN 29 (H) 6 - 20 MG/DL    Creatinine 0.82 0.55 - 1.02 MG/DL    BUN/Creatinine ratio 35 (H) 12 - 20      GFR est AA >60 >60 ml/min/1.73m2    GFR est non-AA >60 >60 ml/min/1.73m2    Calcium 8.3 (L) 8.5 - 10.1 MG/DL    Bilirubin, total 0.7 0.2 - 1.0 MG/DL    ALT (SGPT) 40 12 - 78 U/L    AST (SGOT) 24 15 - 37 U/L Alk. phosphatase 224 (H) 45 - 117 U/L    Protein, total 5.3 (L) 6.4 - 8.2 g/dL    Albumin 2.2 (L) 3.5 - 5.0 g/dL    Globulin 3.1 2.0 - 4.0 g/dL    A-G Ratio 0.7 (L) 1.1 - 2.2     GLUCOSE, POC    Collection Time: 06/06/22  7:57 AM   Result Value Ref Range    Glucose (POC) 151 (H) 65 - 117 mg/dL    Performed by Asha Mendoza PCT    BLOOD GAS, ARTERIAL    Collection Time: 06/06/22 10:57 AM   Result Value Ref Range    pH 7.38 7.35 - 7.45      PCO2 62 (H) 35 - 45 mmHg    PO2 55 (L) 80 - 100 mmHg    O2 SAT 87 (L) 92 - 97 %    BICARBONATE 36 (H) 22 - 26 mmol/L    BASE EXCESS 8.1 mmol/L    O2 METHOD NASAL CANNULA      O2 FLOW RATE 4.00 L/min    Sample source ARTERIAL      SITE RIGHT RADIAL      ELIA'S TEST YES     GLUCOSE, POC    Collection Time: 06/06/22 11:40 AM   Result Value Ref Range    Glucose (POC) 212 (H) 65 - 117 mg/dL    Performed by Asha Mendoza PCT    GLUCOSE, POC    Collection Time: 06/06/22  4:51 PM   Result Value Ref Range    Glucose (POC) 140 (H) 65 - 117 mg/dL    Performed by Vera Vasquez. Assessment:  Known to ID service. In ICU now for hypercarbic RF. No signs of new infection. Recommendations:  Continue vancomycin and zosyn. Per Dr. Contreras Section recommendations from 5/9, patient to be on cefepime but listed to have rash as an allergy. Will defer to Dr. Contreras Section who resumes care on 6/7/22. Abx to continue till 6/16/22. Thank you for the opportunity to participate in the care of this patient. Please contact with questions or concerns.       Caroline Schrader MD  Infectious Diseases

## 2022-06-06 NOTE — PROGRESS NOTES
ICU transfer out acceptance note    Patient seen and examined briefly during my rounds in ICU 9192  Patient lying comfortably in bed with trilogy machine/BiPAP for suspected obesity hypoventilation syndrome.   Patient appropriately diuresed with Lasix 40 mg twice daily per intensivist team, taper oxygen as able  Patient currently rate controlled A. fib on amnio and IV heparin-being changed to p.o. alternatives today  Agree with current plan of care per intensivist  Awaiting transfer out to PCU bed          Do not bill

## 2022-06-07 LAB
APTT PPP: 46.6 SEC (ref 22.1–31)
ARTERIAL PATENCY WRIST A: YES
BASE DEFICIT BLDA-SCNC: 2.1 MMOL/L
BDY SITE: ABNORMAL
ERYTHROCYTE [DISTWIDTH] IN BLOOD BY AUTOMATED COUNT: 18.3 % (ref 11.5–14.5)
GLUCOSE BLD STRIP.AUTO-MCNC: 143 MG/DL (ref 65–117)
GLUCOSE BLD STRIP.AUTO-MCNC: 146 MG/DL (ref 65–117)
GLUCOSE BLD STRIP.AUTO-MCNC: 186 MG/DL (ref 65–117)
GLUCOSE BLD STRIP.AUTO-MCNC: 187 MG/DL (ref 65–117)
HCO3 BLDA-SCNC: 28 MMOL/L (ref 22–26)
HCT VFR BLD AUTO: 25 % (ref 35–47)
HGB BLD-MCNC: 7.2 G/DL (ref 11.5–16)
MCH RBC QN AUTO: 30.1 PG (ref 26–34)
MCHC RBC AUTO-ENTMCNC: 28.8 G/DL (ref 30–36.5)
MCV RBC AUTO: 104.6 FL (ref 80–99)
METHYLMALONATE SERPL-SCNC: 390 NMOL/L (ref 0–378)
NRBC # BLD: 0 K/UL (ref 0–0.01)
NRBC BLD-RTO: 0 PER 100 WBC
PCO2 BLDA: 74 MMHG (ref 35–45)
PH BLDA: 7.2 [PH] (ref 7.35–7.45)
PLATELET # BLD AUTO: 121 K/UL (ref 150–400)
PMV BLD AUTO: 12.3 FL (ref 8.9–12.9)
PO2 BLDA: 63 MMHG (ref 80–100)
RBC # BLD AUTO: 2.39 M/UL (ref 3.8–5.2)
SAO2 % BLD: 86 % (ref 92–97)
SAO2% DEVICE SAO2% SENSOR NAME: ABNORMAL
SERVICE CMNT-IMP: ABNORMAL
SPECIMEN SITE: ABNORMAL
THERAPEUTIC RANGE,PTTT: ABNORMAL SECS (ref 58–77)
WBC # BLD AUTO: 13.3 K/UL (ref 3.6–11)

## 2022-06-07 PROCEDURE — 94640 AIRWAY INHALATION TREATMENT: CPT

## 2022-06-07 PROCEDURE — 74011000250 HC RX REV CODE- 250: Performed by: INTERNAL MEDICINE

## 2022-06-07 PROCEDURE — 99233 SBSQ HOSP IP/OBS HIGH 50: CPT | Performed by: INTERNAL MEDICINE

## 2022-06-07 PROCEDURE — 85730 THROMBOPLASTIN TIME PARTIAL: CPT

## 2022-06-07 PROCEDURE — 74011250637 HC RX REV CODE- 250/637: Performed by: HOSPITALIST

## 2022-06-07 PROCEDURE — 77030040392 HC DRSG OPTIFOAM MDII -A

## 2022-06-07 PROCEDURE — 74011000250 HC RX REV CODE- 250: Performed by: ANESTHESIOLOGY

## 2022-06-07 PROCEDURE — 74011000258 HC RX REV CODE- 258: Performed by: STUDENT IN AN ORGANIZED HEALTH CARE EDUCATION/TRAINING PROGRAM

## 2022-06-07 PROCEDURE — 77030038269 HC DRN EXT URIN PURWCK BARD -A

## 2022-06-07 PROCEDURE — 65270000046 HC RM TELEMETRY

## 2022-06-07 PROCEDURE — 77010033678 HC OXYGEN DAILY

## 2022-06-07 PROCEDURE — 74011250636 HC RX REV CODE- 250/636: Performed by: STUDENT IN AN ORGANIZED HEALTH CARE EDUCATION/TRAINING PROGRAM

## 2022-06-07 PROCEDURE — 85027 COMPLETE CBC AUTOMATED: CPT

## 2022-06-07 PROCEDURE — 74011250637 HC RX REV CODE- 250/637: Performed by: INTERNAL MEDICINE

## 2022-06-07 PROCEDURE — 82962 GLUCOSE BLOOD TEST: CPT

## 2022-06-07 PROCEDURE — 77030020847 HC STATLOK BARD -A

## 2022-06-07 PROCEDURE — 94660 CPAP INITIATION&MGMT: CPT

## 2022-06-07 PROCEDURE — 74011636637 HC RX REV CODE- 636/637: Performed by: HOSPITALIST

## 2022-06-07 PROCEDURE — 74011000250 HC RX REV CODE- 250: Performed by: HOSPITALIST

## 2022-06-07 PROCEDURE — 36415 COLL VENOUS BLD VENIPUNCTURE: CPT

## 2022-06-07 PROCEDURE — 74011250636 HC RX REV CODE- 250/636: Performed by: INTERNAL MEDICINE

## 2022-06-07 RX ADMIN — SUCRALFATE 1 G: 1 TABLET ORAL at 18:32

## 2022-06-07 RX ADMIN — Medication: at 09:22

## 2022-06-07 RX ADMIN — MICONAZOLE NITRATE: 20 CREAM TOPICAL at 09:21

## 2022-06-07 RX ADMIN — SUCRALFATE 1 G: 1 TABLET ORAL at 06:42

## 2022-06-07 RX ADMIN — Medication 2 UNITS: at 09:23

## 2022-06-07 RX ADMIN — SODIUM CHLORIDE, PRESERVATIVE FREE 10 ML: 5 INJECTION INTRAVENOUS at 06:00

## 2022-06-07 RX ADMIN — GABAPENTIN 100 MG: 100 CAPSULE ORAL at 09:20

## 2022-06-07 RX ADMIN — PIPERACILLIN AND TAZOBACTAM 3.38 G: 3; .375 INJECTION, POWDER, LYOPHILIZED, FOR SOLUTION INTRAVENOUS at 14:14

## 2022-06-07 RX ADMIN — FUROSEMIDE 40 MG: 10 INJECTION, SOLUTION INTRAMUSCULAR; INTRAVENOUS at 09:20

## 2022-06-07 RX ADMIN — SUCRALFATE 1 G: 1 TABLET ORAL at 12:06

## 2022-06-07 RX ADMIN — Medication 2 UNITS: at 12:05

## 2022-06-07 RX ADMIN — SODIUM CHLORIDE, PRESERVATIVE FREE 10 ML: 5 INJECTION INTRAVENOUS at 14:14

## 2022-06-07 RX ADMIN — SODIUM CHLORIDE, PRESERVATIVE FREE 10 ML: 5 INJECTION INTRAVENOUS at 22:14

## 2022-06-07 RX ADMIN — PIPERACILLIN AND TAZOBACTAM 3.38 G: 3; .375 INJECTION, POWDER, LYOPHILIZED, FOR SOLUTION INTRAVENOUS at 22:14

## 2022-06-07 RX ADMIN — GABAPENTIN 100 MG: 100 CAPSULE ORAL at 15:45

## 2022-06-07 RX ADMIN — PIPERACILLIN AND TAZOBACTAM 3.38 G: 3; .375 INJECTION, POWDER, LYOPHILIZED, FOR SOLUTION INTRAVENOUS at 05:46

## 2022-06-07 RX ADMIN — SODIUM CHLORIDE, PRESERVATIVE FREE 10 ML: 5 INJECTION INTRAVENOUS at 14:16

## 2022-06-07 RX ADMIN — GABAPENTIN 100 MG: 100 CAPSULE ORAL at 22:13

## 2022-06-07 RX ADMIN — AMIODARONE HYDROCHLORIDE 200 MG: 200 TABLET ORAL at 09:20

## 2022-06-07 RX ADMIN — FUROSEMIDE 40 MG: 10 INJECTION, SOLUTION INTRAMUSCULAR; INTRAVENOUS at 18:32

## 2022-06-07 RX ADMIN — MICONAZOLE NITRATE: 20 CREAM TOPICAL at 22:13

## 2022-06-07 RX ADMIN — IPRATROPIUM BROMIDE AND ALBUTEROL SULFATE 3 ML: .5; 3 SOLUTION RESPIRATORY (INHALATION) at 07:57

## 2022-06-07 RX ADMIN — IPRATROPIUM BROMIDE AND ALBUTEROL SULFATE 3 ML: .5; 3 SOLUTION RESPIRATORY (INHALATION) at 21:07

## 2022-06-07 RX ADMIN — Medication 2 UNITS: at 18:33

## 2022-06-07 RX ADMIN — PANTOPRAZOLE SODIUM 40 MG: 40 TABLET, DELAYED RELEASE ORAL at 06:42

## 2022-06-07 NOTE — PROGRESS NOTES
Comprehensive Nutrition Assessment    Type and Reason for Visit: Reassess    Nutrition Recommendations/Plan:   1. Continue current diet      Nutrition Assessment:  Chart reviewed; medically noted for acute respiratory failure, COPD, HTN, and CHF. Patient continues on a soft and bite sized diet. Sleepy at time of visit but reports a good appetite and eating well. Declines ONS at this time. Encouraged PO intake of meals. Nutrition Related Findings:    K+ 3.3, BG [id number]  1-3+ edema  BM 6/7  Lasix, Humalog, Protonix, Carafate     Wound Type: Skin tears    Current Nutrition Intake & Therapies:  Average Meal Intake: %     ADULT DIET Dysphagia - Soft & Bite Sized    Anthropometric Measures:  Height: 5' 3\" (160 cm)  Ideal Body Weight (IBW): 115 lbs (52 kg)     Current Body Wt:  140 kg (308 lb 10.3 oz), 264.2 % IBW. Current BMI (kg/m2): 54.7                          BMI Category: Obese class 3 (BMI 40.0 or greater)    Estimated Daily Nutrient Needs:  Energy Requirements Based On: Formula  Weight Used for Energy Requirements: Current  Energy (kcal/day): 2015 kcal (BMR 1929 x 1. 2AF -300kcal)  Weight Used for Protein Requirements: Ideal  Protein (g/day): 1015-131g (2.0-2.5 g/kg bw)  Method Used for Fluid Requirements: Other (comment)  Fluid (ml/day): 1800 mL or per MD    Nutrition Diagnosis:   No nutrition diagnosis at this time     Nutrition Interventions:   Food and/or Nutrient Delivery: Continue current diet  Nutrition Education/Counseling: No recommendations at this time  Coordination of Nutrition Care: Continue to monitor while inpatient,Interdisciplinary rounds       Goals:  Previous Goal Met: Goal(s) achieved  Goals:  (PO intake >70% of meals next 5-7 days)       Nutrition Monitoring and Evaluation:   Behavioral-Environmental Outcomes: None identified  Food/Nutrient Intake Outcomes: Food and nutrient intake  Physical Signs/Symptoms Outcomes: Biochemical data,Weight,Skin    Discharge Planning:    Continue current diet    Lex Abreu RD  Contact: ext [phone]

## 2022-06-07 NOTE — PROGRESS NOTES
Hospitalist Progress Note    NAME: Burr Carrel   :  1960   MRN:  [id number]       Assessment / Plan:  Acute on chronic hypoxic/hypercapnic respiratory failure-resolved  Left-sided pleural effusion thoracentesis (too small to tap)  JOSLYN/OHS on trilogy  COPD on O2    Was transferred out of ICU and went back d/t acute hypercapnic respiratory failure, improved with bipap, now on 3 liters, using Trilogy at night. Tapering oral steroids, on 20 mg, last dose tomorrow  Continue trilogy device as per pulmonary. CM working for [employer] at [facility]  Seen by pulmonary continue current treatment  On 3 liters of oxygen which is baseline  Leukocytosis likely d/t steroids    Acute on chronic anemia:  Hb dropped gradually, received 1 unit on , was improving, some drop noted today to 7.2  Stool occult positive  Protonix 40 q12 hours  S/p EGD on 6/3, no active bleeding, biopsy sent, f/u results. Heparin drip was started in unit, stopped to start xarelto today, however as hb dropped to 7.2 today, will hold continue to hold xarelto for now  Repeat hb in am, if further worsen, reconsult GI, if better consider resuming Xarelto. If again bleed may have to stop Xarelto altogether     Left hip fracture displaced  age undetermined  Left hip osteomyelitis/septic arthritis   Bilateral buttock and sacral wounds      -[facility] [place] evaluated no surgical intervention planned  -S/p  Aspiration?/Bx by IR, culture negative. -ID recommends daptomycin and cefepime on discharge last admit.  End date . Anna Doty go to SNF with Daptomycin, switched to vancomycin , ID agrees with this      Metabolic alkalosis  Improved, has chronic alkalosis for compensation  Would avoid diamox, as intensivist thinks this caused her to go into respiratory failure second time    Chronic diastolic heart failure/PAF:   Continue  amiodarone . Lasix resumed  Holding xarelto    Steroid induced hyperglycemia-  A1c level 5.4 on , ISS    HTN   controlled.  Cont' PTA meds. Morbid obesity/Impaired mobility/functional quadriplegia-supportive care  Drug rash- suspected from cefepime now resolved.     Hypokalemia: Resolved     DVT PRx: SCDs for now  RJ HOOD [facility]    TOM: 6/9     Subjective:     Chief Complaint / Reason for Physician Visit  She remains on 3 liters oxygen  She denies any bloody bm    Review of Systems:  Symptom Y/N Comments  Symptom Y/N Comments   Fever/Chills    Chest Pain     Poor Appetite    Edema     Cough    Abdominal Pain     Sputum    Joint Pain     SOB/CHOPRA    Pruritis/Rash     Nausea/vomit    Tolerating PT/OT     Diarrhea    Tolerating Diet     Constipation    Other       Could NOT obtain due to:      Objective:     VITALS:   Last 24hrs VS reviewed since prior progress note.  Most recent are:  Patient Vitals for the past 24 hrs:   Temp Pulse Resp BP SpO2   06/07/22 1445 98.8 °F (37.1 °C) (!) 107 27 (!) 119/45 (!) 86 %   06/07/22 1109 98.7 °F (37.1 °C) (!) 108 27 (!) 135/46 90 %   06/07/22 0800 -- -- -- -- 92 %   06/07/22 0728 98.9 °F (37.2 °C) 84 22 (!) 133/48 95 %   06/07/22 0600 -- 95 25 -- 90 %   06/07/22 0500 -- 83 24 -- 94 %   06/07/22 0457 -- -- -- -- 95 %   06/07/22 0400 98.3 °F (36.8 °C) 88 21 -- 91 %   06/07/22 0300 -- 89 21 -- 92 %   06/07/22 0200 -- 96 24 -- 92 %   06/07/22 0100 -- 91 22 -- 92 %   06/07/22 0000 98.5 °F (36.9 °C) 88 23 -- 92 %   06/06/22 2300 98.5 °F (36.9 °C) 95 26 (!) 121/40 95 %   06/06/22 2217 -- -- -- -- 96 %   06/06/22 2202 -- (!) 103 (!) 33 -- (!) 89 %   06/06/22 2102 -- (!) 102 (!) 31 -- 91 %   06/06/22 2036 -- -- -- -- 90 %   06/06/22 2002 -- 99 (!) 31 -- --   06/06/22 2000 98.3 °F (36.8 °C) 99 29 (!) 122/49 96 %   06/06/22 1902 -- 97 29 (!) 122/49 (!) 88 %   06/06/22 1802 -- (!) 102 24 (!) 104/54 91 %   06/06/22 1655 -- 82 21 (!) 120/45 92 %       Intake/Output Summary (Last 24 hours) at 6/7/2022 1632  Last data filed at 6/7/2022 1445  Gross per 24 hour   Intake 920 ml   Output 2450 ml   Net -1530 ml        I had a face to face encounter and independently examined this patient on 6/7/2022, as outlined below:  PHYSICAL EXAM:  General: Awake, No acute distress  EENT:  EOMI. Anicteric sclerae. MMM  Resp:  CTA bilaterally, no wheezing or rales. No accessory muscle use  CV:  Regular  rhythm,  No edema  GI:  Soft, Non distended, Non tender. +Bowel sounds  Neurologic:  Alert and oriented X 3, normal speech,   Psych:   Not anxious nor agitated  Skin:  No rashes. No jaundice    Reviewed most current lab test results and cultures  YES  Reviewed most current radiology test results   YES  Review and summation of old records today    NO  Reviewed patient's current orders and MAR    YES  PMH/SH reviewed - no change compared to H&P  ________________________________________________________________________  Care Plan discussed with:    Comments   Patient y    Family      RN y    Care Manager     Consultant                        Multidiciplinary team rounds were held today with , nursing, pharmacist and clinical coordinator. Patient's plan of care was discussed; medications were reviewed and discharge planning was addressed. ________________________________________________________________________  Total NON critical care TIME: 38   Minutes    Total CRITICAL CARE TIME Spent:   Minutes non procedure based      Comments   >50% of visit spent in counseling and coordination of care     ________________________________________________________________________  Cande Fox MD     Procedures: see electronic medical records for all procedures/Xrays and details which were not copied into this note but were reviewed prior to creation of Plan. LABS:  I reviewed today's most current labs and imaging studies.   Pertinent labs include:  Recent Labs     06/07/22  0925 06/05/22  1148 06/05/22  0259   WBC 13.3* 20.1* 25.5*   HGB 7.2* 8.1* 8.8*   HCT 25.0* 28.6* 31.4*   * 103* 168     Recent Labs     06/06/22  0353 06/05/22  0259  143   K 3.3* 4.0    106   CO2 31 35*   * 179*   BUN 29* 26*   CREA 0.82 0.83   CA 8.3* 8.2*   MG 2.3  --    PHOS 2.9  --    ALB 2.2*  --    TBILI 0.7  --    ALT 40  --        Signed: Grace Heart MD

## 2022-06-07 NOTE — PROGRESS NOTES
End of Shift Note    Bedside shift change report given to Thea Denver, RN (oncoming nurse) by Cierra Marte RN (offgoing nurse). Report included the following information SBAR, MAR, Recent Results and Cardiac Rhythm Sinus Tach    Shift worked:  [id number]     Shift summary and any significant changes:     Hgb 7.2, Xarelto held, wound care completed   Concerns for physician to address:  none     Zone phone for oncoming shift:   n/a       Activity:  Activity Level: Bed Rest  Number times ambulated in hallways past shift: 0  Number of times OOB to chair past shift: 0    Cardiac:   Cardiac Monitoring: Yes      Cardiac Rhythm: Sinus Tachy    Access:   Current line(s): PICC     Genitourinary:   Urinary status: voiding and external catheter    Respiratory:   O2 Device: BIPAP  Chronic home O2 use?: YES  Incentive spirometer at bedside: N/A       GI:  Last Bowel Movement Date: 06/07/22  Current diet:  ADULT DIET Dysphagia - Soft & Bite Sized  Passing flatus: YES  Tolerating current diet: NO       Pain Management:   Patient states pain is manageable on current regimen: YES    Skin:  Gaston Score: 12  Interventions: turn team, speciality bed, float heels, foam dressing, PT/OT consult and internal/external urinary devices    Patient Safety:  Fall Score:  Total Score: 2  Interventions: bed/chair alarm and pt to call before getting OOB  High Fall Risk: Yes    Length of Stay:  Expected LOS: 3d 14h  Actual LOS: [address], RN

## 2022-06-07 NOTE — PROGRESS NOTES
Problem: Pressure Injury - Risk of  Goal: *Prevention of pressure injury  Description: Document Gaston Scale and appropriate interventions in the flowsheet. Outcome: Progressing Towards Goal  Note: Pressure Injury Interventions:  Sensory Interventions: Float heels,Keep linens dry and wrinkle-free,Minimize linen layers,Monitor skin under medical devices    Moisture Interventions: Apply protective barrier, creams and emollients,Absorbent underpads,Internal/External urinary devices,Minimize layers    Activity Interventions: Pressure redistribution bed/mattress(bed type)    Mobility Interventions: Float heels,HOB 30 degrees or less,PT/OT evaluation,Turn and reposition approx. every two hours(pillow and wedges)    Nutrition Interventions: Document food/fluid/supplement intake,Offer support with meals,snacks and hydration    Friction and Shear Interventions: Apply protective barrier, creams and emollients,Minimize layers,Transferring/repositioning devices                Problem: Falls - Risk of  Goal: *Absence of Falls  Description: Document Anna Fall Risk and appropriate interventions in the flowsheet.   Outcome: Progressing Towards Goal  Note: Fall Risk Interventions:  Mobility Interventions: Bed/chair exit alarm,Communicate number of staff needed for ambulation/transfer    Mentation Interventions: Adequate sleep, hydration, pain control,Bed/chair exit alarm    Medication Interventions: Bed/chair exit alarm,Evaluate medications/consider consulting pharmacy    Elimination Interventions: Bed/chair exit alarm,Call light in reach              Problem: Infection - Risk of, Central Venous Catheter-Associated Bloodstream Infection  Goal: *Absence of infection signs and symptoms  Outcome: Progressing Towards Goal

## 2022-06-07 NOTE — PROGRESS NOTES
Infectious Disease progress      Impression  Chronic displaced femoral neck fracture of the left hip  Possible Osteomyelitis of the left hip  Nonambulatory for greater than 2 years  MRI - pelvis- 5/3- L femoral neck displaced fracture with fluid in the  fracture gap and edema/enhancement & surrounding marrow , soft tissues. Findings may reflect a healing fracture with possible superimposed  osteomyelitis/osteonecrosis. Right sacral and bilateral parasymphyseal pubic insufficiency fractures. RIGHT femoral head and LEFT greater trochanter nondisplaced fractures. S/p evaluation by Ortho  No plans for Surgery per Ortho  Recommendations for antibiotics. Patient continuing with antibiotic therapy  Currently on Vancomycin, Zosyn antibiotic end date 6/16    S/p Fluid aspiration by IR on 5/5  Cultures-NG. No organisms on GS  ( pt has been on Levaquin since 5/4 , may affect final culture results)    Chronic sacral & buttock wounds    Acute on chronic hypoxic, hypercapnic respiratory failure  Clinically improving on 5L O2 by NC, s/p BiPAP      Morbid obesity   BMI 54    Plan  -Patient is currently being treated for septic arthritis/ acute OM x 6 weeks antimicrobials end date 6/16. She is currently on Vancomycin and Zosyn which are potentially nephrotoxic antibiotics. Patient is also being actively diuresed. Currently on Lasix 40 mg IV twice daily. This combination of medications places her at increased risk of nephrotoxicity.  -For MRSA nares culture now, if negative  for DC vancomycin and continue Zosyn IV alone. (Patient has anyway been on MRSA coverage close to 4 weeks.)        Possible adverse effects of long term antibiotics are inclusive of but not limited to following  BM suppression, neutropenia , cytopenias , aplastic anemia hemorrhage liver & renal dysfunction/ liver , renal failure  , GI dysfunction- N, V  Diarrhea,C.difficile disease, rash , allergy , anaphylaxis. toxic epidermal necrolysis  Neuro toxicity , seizure disorder  Side effects tend to be more pronounced in the elderly. D/w pt. Pt seen today. resting, arousable comfortable. Denies complaints.  Tigist Rodriguez  is a 61/F with medical history significant for acute on chronic respiratory failure , atrial fibrillation chronic fracture left hip , currently being treated as osteomyelitis/septic arthritis of left hip based on MRI findings. Patient was  admitted on 05/23 for acute on chronic hypercapnic respiratory failure. Initially admitted to ICU and then transferred to step down on 05/24 after treating with BiPAP. Thereafter transferred back to ICU. Patient seen today on stepdown. ID service has been reconsulted for antibiotic recommendations. Plan from previous hospitalzation was for vancomycin/cefepime IV x6 weeks end date 6/16, to be switched to daptomycin on discharge. Patient had developed adverse effects to cefepime. Daptomycin was not covered by SNF.   Patient is currently on Vancomycin and Zosyn IV     Past Medical History:   Diagnosis Date    Acute on chronic respiratory failure with hypoxia and hypercapnia (HCC) 11/20/2020    Atrial fibrillation ([facility] [address] 11/4/2020    CHF (congestive heart failure) ([facility])     Diabetes ([facility] [address]     Hypertension     Ill-defined condition     high cholesterol    Ill-defined condition     tachycardia    JOSLYN (obstructive sleep apnea) 11/20/2020    Other and unspecified symptoms and signs involving general sensations and perceptions     ruptured disc    S/P cardiac cath 11/4/2020    11/3/2020 nonobstructive disease     Past Surgical History:   Procedure Laterality Date    SD ROBLESE ELECTROPHYSIOL XM W/LEFT ATRIAL PACNG/REC N/A 11/23/2020    Lt Atrial Pace & Record During Ep Study performed by Lazaro Hawkins MD at [facility] CARDIAC CATH LAB    SD COMPRE EP EVAL ABLTJ 3D MAPG TX SVT N/A 11/23/2020    ABLATION A-FLUTTER performed by Lazaro Hawkins MD at [facility] CATHETER ABLATION ARRHYTHMIA ADD ON N/A 11/23/2020    Ablation Svt/Vt Add On performed by Celeste Bartholomew MD at [facility] CARDIAC CATH LAB    GA INTRACARDIAC ELECTROPHYSIOLOGIC 3D MAPPING N/A 11/23/2020    Ep 3d Mapping performed by Celeste Bartholomew MD at [facility] CARDIAC CATH LAB    GA STIM/PACING HEART POST IV DRUG INFU N/A 11/23/2020    Drug Stimulation performed by Celeste Bartholomew MD at [facility] CARDIAC CATH LAB     Allergies   Allergen Reactions    Cefepime Rash    Cefazolin Rash    Other Plant, Animal, Environmental Rash     Ivory soap caused rash on hands. Social History     Socioeconomic History    Marital status: SINGLE     Spouse name: Not on file    Number of children: Not on file    Years of education: Not on file    Highest education level: Not on file   Occupational History    Not on file   Tobacco Use    Smoking status: Former Smoker    Smokeless tobacco: Never Used   Substance and Sexual Activity    Alcohol use: No    Drug use: No    Sexual activity: Not on file   Other Topics Concern    Not on file   Social History Narrative    Not on file     Social Determinants of Health     Financial Resource Strain:     Difficulty of Paying Living Expenses: Not on file   Food Insecurity:     Worried About [facility] in the Last Year: Not on file    Quita of Food in the Last Year: Not on file   Transportation Needs:     Lack of Transportation (Medical): Not on file    Lack of Transportation (Non-Medical):  Not on file   Physical Activity:     Days of Exercise per Week: Not on file    Minutes of Exercise per Session: Not on file   Stress:     Feeling of Stress : Not on file   Social Connections:     Frequency of Communication with Friends and Family: Not on file    Frequency of Social Gatherings with Friends and Family: Not on file    Attends [employer] Services: Not on file    Active Member of Clubs or Organizations: Not on file    Attends Club or Organization Meetings: Not on file    Marital Status: Not on file   Intimate Partner Violence:     Fear of Current or Ex-Partner: Not on file    Emotionally Abused: Not on file    Physically Abused: Not on file    Sexually Abused: Not on file   Housing Stability:     Unable to Pay for Housing in the Last Year: Not on file    Number of [place] in the Last Year: Not on file    Unstable Housing in the Last Year: Not on file     No family status information on file. Medication Documentation Review Audit    **Prior to Admission medications have not yet been reviewed for this encounter**         Review of Systems - Negative except those mentioned in H&P  PHYSICAL EXAM:  General:          Awake, cooperative, no acute distress    EENT:              EOMI. Anicteric sclerae. MMM  Resp:               CTA bilaterally, no wheezing or rales. No accessory muscle use  CV:                  Regular  rhythm,  No edema  GI:                   Soft, Non distended, Non tender. +Bowel sounds  Neurologic:      Alert and oriented X 3, normal speech,   Psych:             Good insight. Not anxious nor agitated  Skin:                No rashes. No jaundice.       Kim Colon MD [place]

## 2022-06-07 NOTE — PROGRESS NOTES
Pt on 4L NC during day and placed back on Bipap overnight to sleep. Resting comfortably. Gave Tramadol at 2200 for leg pain. Lidocaine patches on knees changed out. Pt had BM and was given fresh pads and partial CHG bath and new purewick. Wounds around anus bleeding, cleaned, and put on barrier cream and new foam dressing.

## 2022-06-07 NOTE — PROGRESS NOTES
Transition of Care Plan:     RUR:  24% high risk for readmission  Disposition:  Return to SNF at [facility]  Follow up appointments: To be arranged by SNF  DME needed:  Trilogy machine (being ordered by SNF, settings faxed on 5/31/2022)  Transportation at Discharge: [facility]  Keys or means to access home:  Family has access      IM Medicare Letter:  2nd IMM letter needed prior to discharge  Is patient a BCPI-A Bundle: n/a                    If yes, was Bundle Letter given?:    Is patient a [place] and connected with the [place]? n/a               If yes, was [place] transfer form completed and [facility] notified? Caregiver Contact: Sister Rashi Coombs [phone]/ Daughter Scott Mchugh ([phone])  Discharge Caregiver contacted prior to discharge? Caregiver to be contacted prior to discharge   Care Conference needed?: not needed at this time        CM contacted Aura Sharp (RN liaison for [facility]) to inquire about Trilogy machine. CM faxed trilogy settings to [facility] to work towards ordering trilogy for discharge. CM will inform treatment team when [facility] has trilogy ordered and delivered.     Pt transferred to [facility]. DC plans as above. IV abx orders left on Kardex (to be completed at SNF-current end date 6/16).     DOROTHY Gaming

## 2022-06-07 NOTE — PROGRESS NOTES
Received notification from bedside RN about patient with regards to: leg pain and burning sensation, responded well to Tramadol last night and requesting it again  VS: /49, HR 97, RR 29, O2 sat 91% on BIPAP    Intervention given: Tramadol 50 mg PO x 1 dose ordered

## 2022-06-08 LAB
ANION GAP SERPL CALC-SCNC: 5 MMOL/L (ref 5–15)
APTT PPP: 29.6 SEC (ref 22.1–31)
BACTERIA SPEC CULT: NORMAL
BACTERIA SPEC CULT: NORMAL
BUN SERPL-MCNC: 24 MG/DL (ref 6–20)
BUN/CREAT SERPL: 32 (ref 12–20)
CALCIUM SERPL-MCNC: 8.3 MG/DL (ref 8.5–10.1)
CHLORIDE SERPL-SCNC: 100 MMOL/L (ref 97–108)
CO2 SERPL-SCNC: 39 MMOL/L (ref 21–32)
CREAT SERPL-MCNC: 0.76 MG/DL (ref 0.55–1.02)
CRP SERPL-MCNC: 6.2 MG/DL (ref 0–0.6)
ERYTHROCYTE [DISTWIDTH] IN BLOOD BY AUTOMATED COUNT: 18.1 % (ref 11.5–14.5)
ERYTHROCYTE [SEDIMENTATION RATE] IN BLOOD: 64 MM/HR (ref 0–30)
GLUCOSE BLD STRIP.AUTO-MCNC: 129 MG/DL (ref 65–117)
GLUCOSE BLD STRIP.AUTO-MCNC: 132 MG/DL (ref 65–117)
GLUCOSE BLD STRIP.AUTO-MCNC: 160 MG/DL (ref 65–117)
GLUCOSE BLD STRIP.AUTO-MCNC: 180 MG/DL (ref 65–117)
GLUCOSE SERPL-MCNC: 119 MG/DL (ref 65–100)
HCT VFR BLD AUTO: 25.5 % (ref 35–47)
HGB BLD-MCNC: 7.4 G/DL (ref 11.5–16)
MCH RBC QN AUTO: 31 PG (ref 26–34)
MCHC RBC AUTO-ENTMCNC: 29 G/DL (ref 30–36.5)
MCV RBC AUTO: 106.7 FL (ref 80–99)
NRBC # BLD: 0.04 K/UL (ref 0–0.01)
NRBC BLD-RTO: 0.3 PER 100 WBC
PLATELET # BLD AUTO: 103 K/UL (ref 150–400)
PMV BLD AUTO: 12.1 FL (ref 8.9–12.9)
POTASSIUM SERPL-SCNC: 3.3 MMOL/L (ref 3.5–5.1)
RBC # BLD AUTO: 2.39 M/UL (ref 3.8–5.2)
SERVICE CMNT-IMP: ABNORMAL
SERVICE CMNT-IMP: NORMAL
SODIUM SERPL-SCNC: 144 MMOL/L (ref 136–145)
THERAPEUTIC RANGE,PTTT: NORMAL SECS (ref 58–77)
VANCOMYCIN SERPL-MCNC: 10.8 UG/ML
WBC # BLD AUTO: 12.5 K/UL (ref 3.6–11)

## 2022-06-08 PROCEDURE — 74011250637 HC RX REV CODE- 250/637: Performed by: INTERNAL MEDICINE

## 2022-06-08 PROCEDURE — 74011000258 HC RX REV CODE- 258: Performed by: STUDENT IN AN ORGANIZED HEALTH CARE EDUCATION/TRAINING PROGRAM

## 2022-06-08 PROCEDURE — 77010033678 HC OXYGEN DAILY

## 2022-06-08 PROCEDURE — 94660 CPAP INITIATION&MGMT: CPT

## 2022-06-08 PROCEDURE — 80202 ASSAY OF VANCOMYCIN: CPT

## 2022-06-08 PROCEDURE — 74011000250 HC RX REV CODE- 250: Performed by: INTERNAL MEDICINE

## 2022-06-08 PROCEDURE — 94640 AIRWAY INHALATION TREATMENT: CPT

## 2022-06-08 PROCEDURE — 99233 SBSQ HOSP IP/OBS HIGH 50: CPT | Performed by: INTERNAL MEDICINE

## 2022-06-08 PROCEDURE — 74011636637 HC RX REV CODE- 636/637: Performed by: HOSPITALIST

## 2022-06-08 PROCEDURE — 74011000250 HC RX REV CODE- 250: Performed by: HOSPITALIST

## 2022-06-08 PROCEDURE — 74011250637 HC RX REV CODE- 250/637: Performed by: HOSPITALIST

## 2022-06-08 PROCEDURE — 74011250637 HC RX REV CODE- 250/637: Performed by: GENERAL ACUTE CARE HOSPITAL

## 2022-06-08 PROCEDURE — 74011000250 HC RX REV CODE- 250: Performed by: ANESTHESIOLOGY

## 2022-06-08 PROCEDURE — 65270000046 HC RM TELEMETRY

## 2022-06-08 PROCEDURE — 74011250636 HC RX REV CODE- 250/636: Performed by: INTERNAL MEDICINE

## 2022-06-08 PROCEDURE — 74011250636 HC RX REV CODE- 250/636: Performed by: STUDENT IN AN ORGANIZED HEALTH CARE EDUCATION/TRAINING PROGRAM

## 2022-06-08 PROCEDURE — 36415 COLL VENOUS BLD VENIPUNCTURE: CPT

## 2022-06-08 PROCEDURE — 82962 GLUCOSE BLOOD TEST: CPT

## 2022-06-08 PROCEDURE — 82610 CYSTATIN C: CPT

## 2022-06-08 PROCEDURE — 74011250636 HC RX REV CODE- 250/636: Performed by: GENERAL ACUTE CARE HOSPITAL

## 2022-06-08 PROCEDURE — 86140 C-REACTIVE PROTEIN: CPT

## 2022-06-08 PROCEDURE — 80048 BASIC METABOLIC PNL TOTAL CA: CPT

## 2022-06-08 PROCEDURE — 85027 COMPLETE CBC AUTOMATED: CPT

## 2022-06-08 PROCEDURE — 85730 THROMBOPLASTIN TIME PARTIAL: CPT

## 2022-06-08 PROCEDURE — 85652 RBC SED RATE AUTOMATED: CPT

## 2022-06-08 RX ORDER — POTASSIUM CHLORIDE 20 MEQ/1
40 TABLET, EXTENDED RELEASE ORAL
Status: COMPLETED | OUTPATIENT
Start: 2022-06-08 | End: 2022-06-08

## 2022-06-08 RX ORDER — POTASSIUM CHLORIDE 20 MEQ/1
20 TABLET, EXTENDED RELEASE ORAL DAILY
Status: DISCONTINUED | OUTPATIENT
Start: 2022-06-09 | End: 2022-06-09

## 2022-06-08 RX ORDER — BALSAM PERU/CASTOR OIL
OINTMENT (GRAM) TOPICAL 2 TIMES DAILY
Status: DISCONTINUED | OUTPATIENT
Start: 2022-06-08 | End: 2022-06-17 | Stop reason: HOSPADM

## 2022-06-08 RX ADMIN — MICONAZOLE NITRATE: 20 CREAM TOPICAL at 21:39

## 2022-06-08 RX ADMIN — PIPERACILLIN AND TAZOBACTAM 3.38 G: 3; .375 INJECTION, POWDER, LYOPHILIZED, FOR SOLUTION INTRAVENOUS at 13:44

## 2022-06-08 RX ADMIN — Medication: at 18:37

## 2022-06-08 RX ADMIN — GABAPENTIN 100 MG: 100 CAPSULE ORAL at 18:26

## 2022-06-08 RX ADMIN — SUCRALFATE 1 G: 1 TABLET ORAL at 18:26

## 2022-06-08 RX ADMIN — Medication 1 CAPSULE: at 08:59

## 2022-06-08 RX ADMIN — Medication 2 UNITS: at 09:07

## 2022-06-08 RX ADMIN — SODIUM CHLORIDE, PRESERVATIVE FREE 10 ML: 5 INJECTION INTRAVENOUS at 05:18

## 2022-06-08 RX ADMIN — PIPERACILLIN AND TAZOBACTAM 3.38 G: 3; .375 INJECTION, POWDER, LYOPHILIZED, FOR SOLUTION INTRAVENOUS at 05:45

## 2022-06-08 RX ADMIN — Medication: at 09:01

## 2022-06-08 RX ADMIN — Medication 2 UNITS: at 12:25

## 2022-06-08 RX ADMIN — PANTOPRAZOLE SODIUM 40 MG: 40 TABLET, DELAYED RELEASE ORAL at 09:07

## 2022-06-08 RX ADMIN — SODIUM CHLORIDE, PRESERVATIVE FREE 10 ML: 5 INJECTION INTRAVENOUS at 13:45

## 2022-06-08 RX ADMIN — GABAPENTIN 100 MG: 100 CAPSULE ORAL at 08:59

## 2022-06-08 RX ADMIN — FUROSEMIDE 40 MG: 10 INJECTION, SOLUTION INTRAMUSCULAR; INTRAVENOUS at 18:26

## 2022-06-08 RX ADMIN — FUROSEMIDE 40 MG: 10 INJECTION, SOLUTION INTRAMUSCULAR; INTRAVENOUS at 08:59

## 2022-06-08 RX ADMIN — SODIUM CHLORIDE, PRESERVATIVE FREE 10 ML: 5 INJECTION INTRAVENOUS at 21:21

## 2022-06-08 RX ADMIN — PIPERACILLIN AND TAZOBACTAM 3.38 G: 3; .375 INJECTION, POWDER, LYOPHILIZED, FOR SOLUTION INTRAVENOUS at 21:24

## 2022-06-08 RX ADMIN — IPRATROPIUM BROMIDE AND ALBUTEROL SULFATE 3 ML: .5; 3 SOLUTION RESPIRATORY (INHALATION) at 08:17

## 2022-06-08 RX ADMIN — MICONAZOLE NITRATE: 20 CREAM TOPICAL at 09:01

## 2022-06-08 RX ADMIN — AMIODARONE HYDROCHLORIDE 200 MG: 200 TABLET ORAL at 08:59

## 2022-06-08 RX ADMIN — CASTOR OIL AND BALSAM, PERU: 788; 87 OINTMENT TOPICAL at 21:39

## 2022-06-08 RX ADMIN — POTASSIUM CHLORIDE 40 MEQ: 1500 TABLET, EXTENDED RELEASE ORAL at 12:25

## 2022-06-08 RX ADMIN — IPRATROPIUM BROMIDE AND ALBUTEROL SULFATE 3 ML: .5; 3 SOLUTION RESPIRATORY (INHALATION) at 19:22

## 2022-06-08 RX ADMIN — SODIUM CHLORIDE, PRESERVATIVE FREE 10 ML: 5 INJECTION INTRAVENOUS at 18:26

## 2022-06-08 RX ADMIN — GABAPENTIN 100 MG: 100 CAPSULE ORAL at 21:24

## 2022-06-08 RX ADMIN — SUCRALFATE 1 G: 1 TABLET ORAL at 09:07

## 2022-06-08 RX ADMIN — VANCOMYCIN HYDROCHLORIDE 750 MG: 750 INJECTION, POWDER, LYOPHILIZED, FOR SOLUTION INTRAVENOUS at 12:25

## 2022-06-08 RX ADMIN — SUCRALFATE 1 G: 1 TABLET ORAL at 12:25

## 2022-06-08 NOTE — PROGRESS NOTES
Transition of Care Plan:     RUR:  24% high risk for readmission  Disposition:  Return to SNF at [facility]  Follow up appointments:  To be arranged by SNF  DME needed:  Trilogy machine (being ordered by SNF, settings faxed on 5/31/2022) Spoke with lialoraine on 6.7.22 and she states they will be getting the trilogy machine from the daughter's home. [facility]:  Family has access       Medicare Letter:  2nd [facility] letter needed prior to discharge  Is patient a BCPI-A Bundle: n/a                    If yes, was Bundle Letter given?:    Is patient a  and connected with the [facility]? n/a               If yes, was [place] transfer form completed and VA notified? Caregiver Contact: Sister Sarabjit Salinas [phone]/ Daughter Jack Avila ([phone])  Discharge Caregiver contacted prior to discharge? [facility]?: not needed at this time         Patient continues to be monitored in the pcu. Per nurse in [facility] today states patient was placed on 10 liters oxygen today. Will continue to monitor. Amira Thao RN BSN CRM        [phone]

## 2022-06-08 NOTE — RT PROTOCOL NOTE
ADULT PROTOCOL: JET AEROSOL ASSESSMENT    Patient  Joe Grossman     57 y.o.   female     6/8/2022  2:25 PM    Breath Sounds Pre Procedure: Right Breath Sounds: Diminished                               Left Breath Sounds: Diminished    Breath Sounds Post Procedure: Right Breath Sounds: Diminished                                 Left Breath Sounds: Diminished    Breathing pattern: Pre procedure Breathing Pattern: Tachypneic          Post procedure Breathing Pattern: Tachypneic    Heart Rate: Pre procedure Pulse: 93           Post procedure Pulse: 90    Resp Rate: Pre procedure Respirations: 29           Post procedure Respirations: 25            Cough: Pre procedure Cough: Non-productive               Post procedure Cough: Non-productive      Oxygen: O2 Device: BIPAP   20/5 with 6L bleed in        SpO2: Pre procedure SpO2: 92 %                 Post procedure SpO2: 93 %      Nebulizer Therapy: Current medications Aerosolized Medications: DuoNeb           Problem List:   Patient Active Problem List   Diagnosis Code    COVID-19 ruled out Z20.822    [facility]-fib ([facility] I48.91    S/P cardiac cath Z98.890    Mixed hyperlipidemia E78.2    Morbid obesity ([facility] E66.01    Acute respiratory failure (HCC) J96.00    Atrial flutter with rapid ventricular response (HCC) I48.92    JOSLYN (obstructive sleep apnea) G47.33    Acute on chronic respiratory failure with hypoxia and hypercapnia (HCC) J96.21, J96.22    SOB (shortness of breath) R06.02    Physical deconditioning R53.81    Counseling regarding advance care planning and goals of care Z71.89    Chronic pain G89.29    Dependence on supplemental oxygen Z99.81    Essential hypertension I10    Heart failure (HCC) I50.9    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

## 2022-06-08 NOTE — PROGRESS NOTES
1500 - Bedside shift change report given to [facility] (oncoming nurse) by Ronaldo Kim (offgoing nurse). Report included the following information SBAR, Kardex, Intake/Output and MAR.    1836 - Removed bipap mask for skin check. Pt's left cheek is red. Ordered venelex     1930 - End of Shift Note    Bedside shift change report given to [facility] (oncoming nurse) by Teresa Romero RN (offgoing nurse). Report included the following information SBAR, Kardex, Intake/Output and MAR    Shift worked:  3p-7p     Shift summary and any significant changes:     Desats quickly. Unable to take in PO. Concerns for physician to address:    Zone phone for oncoming shift:          Activity:  Activity Level: Bed Rest  Number times ambulated in hallways past shift: 0  Number of times OOB to chair past shift: 0    Cardiac:   Cardiac Monitoring: Yes      Cardiac Rhythm: Sinus Tachy    Access:   Current line(s): PICC     Genitourinary:   Urinary status: external catheter    Respiratory:   O2 Device: BIPAP  Chronic home O2 use?: YES  Incentive spirometer at bedside: NO       GI:  Last Bowel Movement Date: 06/08/22  Current diet:  ADULT DIET Dysphagia - Soft & Bite Sized  Passing flatus: YES  Tolerating current diet: NO       Pain Management:   Patient states pain is manageable on current regimen: YES    Skin:  Gaston Score: 12  Interventions: turn team, speciality bed, increase time out of bed, foam dressing, PT/OT consult and internal/external urinary devices    Patient Safety:  Fall Score:  Total Score: 2  Interventions: bed/chair alarm  High Fall Risk: Yes    Length of Stay:  Expected LOS: 3d 14h  Actual LOS: [address] , RN

## 2022-06-08 NOTE — PROGRESS NOTES
Problem: Pressure Injury - Risk of  Goal: *Prevention of pressure injury  Description: Document Gaston Scale and appropriate interventions in the flowsheet. Outcome: Progressing Towards Goal  Note: Pressure Injury Interventions:  Sensory Interventions: Assess changes in LOC,Float heels,Keep linens dry and wrinkle-free,Discuss PT/OT consult with provider,Check visual cues for pain,Minimize linen layers,Turn and reposition approx. every two hours (pillows and wedges if needed),Pressure redistribution bed/mattress (bed type)    Moisture Interventions: Absorbent underpads,Internal/External urinary devices,Apply protective barrier, creams and emollients,Check for incontinence Q2 hours and as needed,Minimize layers    Activity Interventions: Increase time out of bed,Pressure redistribution bed/mattress(bed type),PT/OT evaluation    Mobility Interventions: Assess need for specialty bed,Float heels,Pressure redistribution bed/mattress (bed type),HOB 30 degrees or less,PT/OT evaluation,Turn and reposition approx. every two hours(pillow and wedges)    Nutrition Interventions: Document food/fluid/supplement intake,Offer support with meals,snacks and hydration    Friction and Shear Interventions: Apply protective barrier, creams and emollients,HOB 30 degrees or less,Lift team/patient mobility team,Lift sheet,Minimize layers                Problem: Falls - Risk of  Goal: *Absence of Falls  Description: Document Anna Fall Risk and appropriate interventions in the flowsheet.   Outcome: Progressing Towards Goal  Note: Fall Risk Interventions:  Mobility Interventions: Bed/chair exit alarm,OT consult for ADLs,Patient to call before getting OOB,PT Consult for mobility concerns,PT Consult for assist device competence    Mentation Interventions: Bed/chair exit alarm,Adequate sleep, hydration, pain control    Medication Interventions: Bed/chair exit alarm,Patient to call before getting OOB,Teach patient to arise slowly    Elimination Interventions: Call light in reach,Patient to call for help with toileting needs,Stay With Me (per policy),Bed/chair exit alarm              Problem: Infection - Risk of, Central Venous Catheter-Associated Bloodstream Infection  Goal: *Absence of infection signs and symptoms  Outcome: Progressing Towards Goal

## 2022-06-08 NOTE — PROGRESS NOTES
End of Shift Note    Bedside shift change report given to Aracely Smith RN (oncoming nurse) by Nate Alicea RN (offgoing nurse). Report included the following information SBAR, MAR, Recent results    Shift worked:  7a-3p     Shift summary and any significant changes:     Patient on Bipap, Respiratory distress,      Concerns for physician to address:  Respiratory distress     Zone phone for oncoming shift:   n/a       Activity:  Activity Level: Bed Rest  Number times ambulated in hallways past shift: 0  Number of times OOB to chair past shift: 0    Cardiac:   Cardiac Monitoring: Yes      Cardiac Rhythm: Sinus Tachy    Access:   Current line(s): PIV     Genitourinary:   Urinary status: voiding    Respiratory:   O2 Device: BIPAP  Chronic home O2 use?: YES  Incentive spirometer at bedside: YES       GI:  Last Bowel Movement Date: 06/08/22  Current diet:  ADULT DIET Dysphagia - Soft & Bite Sized  Passing flatus: YES  Tolerating current diet: YES       Pain Management:   Patient states pain is manageable on current regimen: YES    Skin:  Gaston Score: 12  Interventions: float heels, PT/OT consult and limit briefs    Patient Safety:  Fall Score:  Total Score: 2  Interventions: bed/chair alarm and pt to call before getting OOB  High Fall Risk: Yes    Length of Stay:  Expected LOS: 3d 14h  Actual LOS: MARIE [facility]

## 2022-06-08 NOTE — PROGRESS NOTES
Infectious Disease progress      Impression  Chronic displaced femoral neck fracture of the left hip  Possible Osteomyelitis of the left hip  Nonambulatory for greater than 2 years  MRI - pelvis- 5/3- L femoral neck displaced fracture with fluid in the  fracture gap and edema/enhancement & surrounding marrow , soft tissues. Findings may reflect a healing fracture with possible superimposed  osteomyelitis/osteonecrosis. Right sacral and bilateral parasymphyseal pubic insufficiency fractures. RIGHT femoral head and LEFT greater trochanter nondisplaced fractures. S/p evaluation by Ortho  No plans for Surgery per Ortho  Recommendations for antibiotics. Patient continuing with antibiotic therapy  Currently on Vancomycin, Zosyn antibiotic end date 6/16    S/p Fluid aspiration by IR on 5/5  Cultures-NG. No organisms on GS  ( pt has been on Levaquin since 5/4 , may affect final culture results)    Chronic sacral & buttock wounds    Acute on chronic hypoxic, hypercapnic respiratory failure  Back on BiPAP      Morbid obesity   BMI 54    ESR 64( was 58, 60)   CRP 6.2( was 7.4, 8.65)    Plan  -Patient is  being treated for septic arthritis/ acute OM x 6 weeks antimicrobials end date 6/16. She is currently on Vancomycin and Zosyn which are potentially nephrotoxic antibiotics. Patient is also being actively diuresed. Currently on Lasix 40 mg IV twice daily. This combination of medications places her at increased risk of nephrotoxicity. -MRSA nares culture pending, if negative  DC vancomycin, continue Zosyn IV alone. (Patient has anyway been on MRSA coverage close to 4 weeks.)        Possible adverse effects of long term antibiotics are inclusive of but not limited to following  BM suppression, neutropenia , cytopenias , aplastic anemia hemorrhage liver & renal dysfunction/ liver , renal failure  , GI dysfunction- N, V  Diarrhea,C.difficile disease, rash , allergy , anaphylaxis. toxic epidermal necrolysis  Neuro toxicity , seizure disorder  Side effects tend to be more pronounced in the elderly. D/w pt. Pt seen today. awake, on BiPAP  Denies new complaints. D/w RN events of the morning.    Farnaz Powell  is a 61/F with medical history significant for acute on chronic respiratory failure , atrial fibrillation chronic fracture left hip , currently being treated as osteomyelitis/septic arthritis of left hip based on MRI findings. Patient was  admitted on 05/23 for acute on chronic hypercapnic respiratory failure. Initially admitted to ICU and then transferred to step down on 05/24 after treating with BiPAP. Thereafter transferred back to ICU. Patient seen today on stepdown. ID service has been reconsulted for antibiotic recommendations. Plan from previous hospitalzation was for vancomycin/cefepime IV x6 weeks end date 6/16, to be switched to daptomycin on discharge. Patient had developed adverse effects to cefepime. Daptomycin was not covered by SNF.   Patient is currently on Vancomycin and Zosyn IV     Past Medical History:   Diagnosis Date    Acute on chronic respiratory failure with hypoxia and hypercapnia (HCC) 11/20/2020    Atrial fibrillation ([facility] 11/4/2020    CHF (congestive heart failure) ([facility])     Diabetes ([facility]     Hypertension     Ill-defined condition     high cholesterol    Ill-defined condition     tachycardia    JOSLYN (obstructive sleep apnea) 11/20/2020    Other and unspecified symptoms and signs involving general sensations and perceptions     ruptured disc    S/P cardiac cath 11/4/2020    11/3/2020 nonobstructive disease     Past Surgical History:   Procedure Laterality Date    RI COMPRE ELECTROPHYSIOL XM W/LEFT ATRIAL PACNG/REC N/A 11/23/2020    Lt Atrial Pace & Record During Ep Study performed by Lauren Cavazos MD at hospitals CARDIAC CATH LAB    RI COMPRE EP EVAL ABLTJ 3D MAPG TX SVT N/A 11/23/2020    ABLATION A-FLUTTER performed by Lauren Cavazos MD at [place] [address] LAB    PA ICAR CATHETER ABLATION ARRHYTHMIA ADD ON N/A 11/23/2020    Ablation Svt/Vt Add On performed by Edward Chavez MD at [facility] CARDIAC CATH LAB    PA INTRACARDIAC ELECTROPHYSIOLOGIC 3D MAPPING N/A 11/23/2020    Ep 3d Mapping performed by Edward Chavez MD at [facility] CARDIAC CATH LAB    PA STIM/PACING HEART POST IV DRUG INFU N/A 11/23/2020    Drug Stimulation performed by Edward Chavez MD at [facility] CARDIAC CATH LAB     Allergies   Allergen Reactions    Cefepime Rash    Cefazolin Rash    Other Plant, Animal, Environmental Rash     Ivory soap caused rash on hands. Social History     Socioeconomic History    Marital status: SINGLE     Spouse name: Not on file    Number of children: Not on file    Years of education: Not on file    Highest education level: Not on file   Occupational History    Not on file   Tobacco Use    Smoking status: Former Smoker    Smokeless tobacco: Never Used   Substance and Sexual Activity    Alcohol use: No    Drug use: No    Sexual activity: Not on file   Other Topics Concern    Not on file   Social History Narrative    Not on file     Social Determinants of Health     Financial Resource Strain:     Difficulty of Paying Living Expenses: Not on file   Food Insecurity:     Worried About [facility] in the Last Year: Not on file    Quita of Food in the Last Year: Not on file   Transportation Needs:     Lack of Transportation (Medical): Not on file    Lack of Transportation (Non-Medical):  Not on file   Physical Activity:     Days of Exercise per Week: Not on file    Minutes of Exercise per Session: Not on file   Stress:     Feeling of Stress : Not on file   Social Connections:     Frequency of Communication with Friends and Family: Not on file    Frequency of Social Gatherings with Friends and Family: Not on file    Attends Orthodox Services: Not on file    Active Member of Clubs or Organizations: Not on file    Attends Club or Organization Meetings: Not on file    Marital Status: Not on file   Intimate Partner Violence:     Fear of Current or Ex-Partner: Not on file    Emotionally Abused: Not on file    Physically Abused: Not on file    Sexually Abused: Not on file   Housing Stability:     Unable to Pay for Housing in the Last Year: Not on file    Number of [place] in the Last Year: Not on file    Unstable Housing in the Last Year: Not on file     No family status information on file. Medication Documentation Review Audit    **Prior to Admission medications have not yet been reviewed for this encounter**         Review of Systems - Negative except those mentioned in H&P  PHYSICAL EXAM:  General:          Awake, cooperative, no acute distress    EENT:              EOMI. Anicteric sclerae. MMM  Resp:               CTA bilaterally, no wheezing or rales. No accessory muscle use  CV:                  Regular  rhythm,  No edema  GI:                   Soft, Non distended, Non tender. +Bowel sounds  Neurologic:      Alert and oriented X 3, normal speech,   Psych:             Good insight. Not anxious nor agitated  Skin:                No rashes. No jaundice.       Jacque Pulse, MD [address]

## 2022-06-09 LAB
ANION GAP SERPL CALC-SCNC: 5 MMOL/L (ref 5–15)
BASOPHILS # BLD: 0 K/UL (ref 0–0.1)
BASOPHILS NFR BLD: 0 % (ref 0–1)
BUN SERPL-MCNC: 19 MG/DL (ref 6–20)
BUN/CREAT SERPL: 26 (ref 12–20)
CALCIUM SERPL-MCNC: 8.5 MG/DL (ref 8.5–10.1)
CHLORIDE SERPL-SCNC: 100 MMOL/L (ref 97–108)
CO2 SERPL-SCNC: 39 MMOL/L (ref 21–32)
CREAT SERPL-MCNC: 0.74 MG/DL (ref 0.55–1.02)
DIFFERENTIAL METHOD BLD: ABNORMAL
EOSINOPHIL # BLD: 0.7 K/UL (ref 0–0.4)
EOSINOPHIL NFR BLD: 7 % (ref 0–7)
ERYTHROCYTE [DISTWIDTH] IN BLOOD BY AUTOMATED COUNT: 18.3 % (ref 11.5–14.5)
GLUCOSE BLD STRIP.AUTO-MCNC: 137 MG/DL (ref 65–117)
GLUCOSE BLD STRIP.AUTO-MCNC: 226 MG/DL (ref 65–117)
GLUCOSE BLD STRIP.AUTO-MCNC: 239 MG/DL (ref 65–117)
GLUCOSE BLD STRIP.AUTO-MCNC: 246 MG/DL (ref 65–117)
GLUCOSE SERPL-MCNC: 130 MG/DL (ref 65–100)
HCT VFR BLD AUTO: 25.6 % (ref 35–47)
HGB BLD-MCNC: 7.3 G/DL (ref 11.5–16)
IMM GRANULOCYTES # BLD AUTO: 0.1 K/UL (ref 0–0.04)
IMM GRANULOCYTES NFR BLD AUTO: 1 % (ref 0–0.5)
LYMPHOCYTES # BLD: 1.1 K/UL (ref 0.8–3.5)
LYMPHOCYTES NFR BLD: 11 % (ref 12–49)
MCH RBC QN AUTO: 29.9 PG (ref 26–34)
MCHC RBC AUTO-ENTMCNC: 28.5 G/DL (ref 30–36.5)
MCV RBC AUTO: 104.9 FL (ref 80–99)
MONOCYTES # BLD: 0.5 K/UL (ref 0–1)
MONOCYTES NFR BLD: 5 % (ref 5–13)
NEUTS SEG # BLD: 7.4 K/UL (ref 1.8–8)
NEUTS SEG NFR BLD: 76 % (ref 32–75)
NRBC # BLD: 0.02 K/UL (ref 0–0.01)
NRBC BLD-RTO: 0.2 PER 100 WBC
PLATELET # BLD AUTO: 86 K/UL (ref 150–400)
PMV BLD AUTO: 12.6 FL (ref 8.9–12.9)
POTASSIUM SERPL-SCNC: 2.9 MMOL/L (ref 3.5–5.1)
RBC # BLD AUTO: 2.44 M/UL (ref 3.8–5.2)
RBC MORPH BLD: ABNORMAL
RBC MORPH BLD: ABNORMAL
SERVICE CMNT-IMP: ABNORMAL
SODIUM SERPL-SCNC: 144 MMOL/L (ref 136–145)
WBC # BLD AUTO: 9.8 K/UL (ref 3.6–11)

## 2022-06-09 PROCEDURE — 74011250636 HC RX REV CODE- 250/636: Performed by: STUDENT IN AN ORGANIZED HEALTH CARE EDUCATION/TRAINING PROGRAM

## 2022-06-09 PROCEDURE — 74011250637 HC RX REV CODE- 250/637: Performed by: INTERNAL MEDICINE

## 2022-06-09 PROCEDURE — 77010033678 HC OXYGEN DAILY

## 2022-06-09 PROCEDURE — 36415 COLL VENOUS BLD VENIPUNCTURE: CPT

## 2022-06-09 PROCEDURE — 74011000258 HC RX REV CODE- 258: Performed by: STUDENT IN AN ORGANIZED HEALTH CARE EDUCATION/TRAINING PROGRAM

## 2022-06-09 PROCEDURE — 74011250636 HC RX REV CODE- 250/636: Performed by: INTERNAL MEDICINE

## 2022-06-09 PROCEDURE — 74011250637 HC RX REV CODE- 250/637: Performed by: STUDENT IN AN ORGANIZED HEALTH CARE EDUCATION/TRAINING PROGRAM

## 2022-06-09 PROCEDURE — 74011000250 HC RX REV CODE- 250: Performed by: INTERNAL MEDICINE

## 2022-06-09 PROCEDURE — 74011250637 HC RX REV CODE- 250/637: Performed by: GENERAL ACUTE CARE HOSPITAL

## 2022-06-09 PROCEDURE — 74011250637 HC RX REV CODE- 250/637: Performed by: HOSPITALIST

## 2022-06-09 PROCEDURE — 65270000046 HC RM TELEMETRY

## 2022-06-09 PROCEDURE — 94640 AIRWAY INHALATION TREATMENT: CPT

## 2022-06-09 PROCEDURE — 85025 COMPLETE CBC W/AUTO DIFF WBC: CPT

## 2022-06-09 PROCEDURE — 94660 CPAP INITIATION&MGMT: CPT

## 2022-06-09 PROCEDURE — 74011000250 HC RX REV CODE- 250: Performed by: HOSPITALIST

## 2022-06-09 PROCEDURE — 82962 GLUCOSE BLOOD TEST: CPT

## 2022-06-09 PROCEDURE — 74011636637 HC RX REV CODE- 636/637: Performed by: HOSPITALIST

## 2022-06-09 PROCEDURE — 74011000250 HC RX REV CODE- 250: Performed by: ANESTHESIOLOGY

## 2022-06-09 PROCEDURE — 80048 BASIC METABOLIC PNL TOTAL CA: CPT

## 2022-06-09 RX ORDER — POTASSIUM CHLORIDE 20 MEQ/1
40 TABLET, EXTENDED RELEASE ORAL 3 TIMES DAILY
Status: COMPLETED | OUTPATIENT
Start: 2022-06-09 | End: 2022-06-09

## 2022-06-09 RX ORDER — POTASSIUM CHLORIDE 20 MEQ/1
40 TABLET, EXTENDED RELEASE ORAL DAILY
Status: DISCONTINUED | OUTPATIENT
Start: 2022-06-10 | End: 2022-06-17 | Stop reason: HOSPADM

## 2022-06-09 RX ADMIN — SUCRALFATE 1 G: 1 TABLET ORAL at 11:14

## 2022-06-09 RX ADMIN — IPRATROPIUM BROMIDE AND ALBUTEROL SULFATE 3 ML: .5; 3 SOLUTION RESPIRATORY (INHALATION) at 08:21

## 2022-06-09 RX ADMIN — Medication 2 UNITS: at 22:28

## 2022-06-09 RX ADMIN — GABAPENTIN 100 MG: 100 CAPSULE ORAL at 08:52

## 2022-06-09 RX ADMIN — PIPERACILLIN AND TAZOBACTAM 3.38 G: 3; .375 INJECTION, POWDER, LYOPHILIZED, FOR SOLUTION INTRAVENOUS at 13:41

## 2022-06-09 RX ADMIN — CASTOR OIL AND BALSAM, PERU: 788; 87 OINTMENT TOPICAL at 08:44

## 2022-06-09 RX ADMIN — TRAMADOL HYDROCHLORIDE 50 MG: 50 TABLET, COATED ORAL at 22:30

## 2022-06-09 RX ADMIN — SUCRALFATE 1 G: 1 TABLET ORAL at 08:52

## 2022-06-09 RX ADMIN — PIPERACILLIN AND TAZOBACTAM 3.38 G: 3; .375 INJECTION, POWDER, LYOPHILIZED, FOR SOLUTION INTRAVENOUS at 22:26

## 2022-06-09 RX ADMIN — POTASSIUM CHLORIDE 20 MEQ: 1500 TABLET, EXTENDED RELEASE ORAL at 08:52

## 2022-06-09 RX ADMIN — Medication 1 CAPSULE: at 08:52

## 2022-06-09 RX ADMIN — Medication: at 08:44

## 2022-06-09 RX ADMIN — TRAMADOL HYDROCHLORIDE 50 MG: 50 TABLET, COATED ORAL at 11:39

## 2022-06-09 RX ADMIN — RIVAROXABAN 20 MG: 20 TABLET, FILM COATED ORAL at 11:14

## 2022-06-09 RX ADMIN — PIPERACILLIN AND TAZOBACTAM 3.38 G: 3; .375 INJECTION, POWDER, LYOPHILIZED, FOR SOLUTION INTRAVENOUS at 06:38

## 2022-06-09 RX ADMIN — Medication 3 UNITS: at 11:40

## 2022-06-09 RX ADMIN — PANTOPRAZOLE SODIUM 40 MG: 40 TABLET, DELAYED RELEASE ORAL at 08:52

## 2022-06-09 RX ADMIN — SODIUM CHLORIDE, PRESERVATIVE FREE 10 ML: 5 INJECTION INTRAVENOUS at 13:41

## 2022-06-09 RX ADMIN — MICONAZOLE NITRATE: 20 CREAM TOPICAL at 22:32

## 2022-06-09 RX ADMIN — AMIODARONE HYDROCHLORIDE 200 MG: 200 TABLET ORAL at 08:52

## 2022-06-09 RX ADMIN — SODIUM CHLORIDE, PRESERVATIVE FREE 10 ML: 5 INJECTION INTRAVENOUS at 06:39

## 2022-06-09 RX ADMIN — SUCRALFATE 1 G: 1 TABLET ORAL at 17:21

## 2022-06-09 RX ADMIN — GABAPENTIN 100 MG: 100 CAPSULE ORAL at 17:22

## 2022-06-09 RX ADMIN — FUROSEMIDE 40 MG: 10 INJECTION, SOLUTION INTRAMUSCULAR; INTRAVENOUS at 08:52

## 2022-06-09 RX ADMIN — MICONAZOLE NITRATE: 20 CREAM TOPICAL at 08:43

## 2022-06-09 RX ADMIN — FUROSEMIDE 40 MG: 10 INJECTION, SOLUTION INTRAMUSCULAR; INTRAVENOUS at 17:21

## 2022-06-09 RX ADMIN — Medication 3 UNITS: at 17:22

## 2022-06-09 RX ADMIN — SODIUM CHLORIDE, PRESERVATIVE FREE 10 ML: 5 INJECTION INTRAVENOUS at 06:38

## 2022-06-09 RX ADMIN — POTASSIUM CHLORIDE 40 MEQ: 1500 TABLET, EXTENDED RELEASE ORAL at 17:22

## 2022-06-09 RX ADMIN — GABAPENTIN 100 MG: 100 CAPSULE ORAL at 22:27

## 2022-06-09 RX ADMIN — IPRATROPIUM BROMIDE AND ALBUTEROL SULFATE 3 ML: .5; 3 SOLUTION RESPIRATORY (INHALATION) at 20:07

## 2022-06-09 RX ADMIN — POTASSIUM CHLORIDE 40 MEQ: 1500 TABLET, EXTENDED RELEASE ORAL at 11:18

## 2022-06-09 RX ADMIN — SODIUM CHLORIDE, PRESERVATIVE FREE 10 ML: 5 INJECTION INTRAVENOUS at 22:33

## 2022-06-09 RX ADMIN — CASTOR OIL AND BALSAM, PERU: 788; 87 OINTMENT TOPICAL at 22:28

## 2022-06-09 NOTE — PROGRESS NOTES
Rupesh Rosen RN inform hositalist Dr. Marylene Mariner about patient having three soft stools today and complaining that her stools are becoming more frequent. Per md monitor patient stools and temperature. IF patient has fever we may need to test stool for c.diff due to antibiotic therapy. Jm Shaan

## 2022-06-09 NOTE — PROGRESS NOTES
Hospitalist Progress Note    NAME: Shanti Childs   :  1960   MRN:  [id number]       Assessment / Plan:  Acute on chronic hypoxic/hypercapnic respiratory failure-resolved  Left-sided pleural effusion thoracentesis (too small to tap)  JOSLYN/OHS on trilogy  COPD on O2  Was transferred out of ICU and went back d/t acute hypercapnic respiratory failure, improved with bipap, now on 3 liters, using Trilogy at night. Tapering oral steroids, on 20 mg, last dose tomorrow  Continue trilogy device as per pulmonary. CM working for [facility] at [facility]  Seen by pulmonary continue current treatment  On 3 liters of oxygen at baseline  Leukocytosis likely d/t steroids    Acute on chronic anemia:  Hb dropped gradually, received 1 unit on , was improving, some drop noted today to 7.2  Stool occult positive  Protonix 40 q12 hours  S/p EGD on 6/3, no active bleeding, biopsy sent, f/u results. Heparin drip was started in unit, stopped to start xarelto today, however as hb dropped will hold continue to hold xarelto for now  Repeat hb in am, if further worsen, reconsult GI, if better consider resuming Xarelto. If again bleed may have to stop Xarelto altogether     Left hip fracture displaced  age undetermined  Left hip osteomyelitis/septic arthritis   Bilateral buttock and sacral wounds    -[facility] [place] evaluated no surgical intervention planned  -S/p  Aspiration?/Bx by IR, culture negative. -ID recommends daptomycin and cefepime on discharge last admit.  End date . Kristina Kaye go to SNF with Daptomycin, switched to vancomycin. ID recs to check MRSA screen and if neg then DC Vanco and cont Zosyn      Metabolic alkalosis  Improved, has chronic alkalosis for compensation  Would avoid diamox, as intensivist thinks this caused her to go into respiratory failure second time    Chronic diastolic heart failure/PAF:   Continue  amiodarone .    IV Lasix   Holding xarelto    Steroid induced hyperglycemia-  A1c level 5.4 on , ISS    HTN   controlled.  Cont' PTA meds. Morbid obesity/Impaired mobility/functional quadriplegia-supportive care  Drug rash- suspected from cefepime now resolved.     Hypokalemia: Resolved     DVT PRx: SCDs for now  [facility]    TOM: 6/10     Subjective:     Chief Complaint / Reason for Physician Visit  She remains on 6 liters oxygen, desated while taking her meds  She denies any bloody bm    Review of Systems:  Symptom Y/N Comments  Symptom Y/N Comments   Fever/Chills    Chest Pain     Poor Appetite    Edema     Cough    Abdominal Pain     Sputum    Joint Pain     SOB/CHOPRA    Pruritis/Rash     Nausea/vomit    Tolerating PT/OT     Diarrhea    Tolerating Diet     Constipation    Other       Could NOT obtain due to:      Objective:     VITALS:   Last 24hrs VS reviewed since prior progress note. Most recent are:  Patient Vitals for the past 24 hrs:   Temp Pulse Resp BP SpO2   06/08/22 1928 98.2 °F (36.8 °C) 95 25 (!) 145/53 92 %   06/08/22 1923 -- -- -- -- 96 %   06/08/22 1825 -- 94 27 -- 97 %   06/08/22 1526 -- 96 (!) 31 (!) 136/49 96 %   06/08/22 1454 -- -- -- -- 96 %   06/08/22 1102 98.1 °F (36.7 °C) 97 (!) 38 (!) 129/42 92 %   06/08/22 0929 98.7 °F (37.1 °C) (!) 107 24 (!) 131/45 93 %   06/08/22 0817 -- -- -- -- 92 %   06/08/22 0428 -- -- -- -- 93 %   06/08/22 0347 98.1 °F (36.7 °C) 95 27 (!) 132/46 93 %   06/08/22 0046 -- -- -- -- 95 %   06/07/22 2309 98 °F (36.7 °C) 98 27 (!) 114/40 92 %       Intake/Output Summary (Last 24 hours) at 6/8/2022 2223  Last data filed at 6/8/2022 1817  Gross per 24 hour   Intake 0 ml   Output 1700 ml   Net -1700 ml        I had a face to face encounter and independently examined this patient on 6/8/2022, as outlined below:  PHYSICAL EXAM:  General: Awake, No acute distress  EENT:  EOMI. Anicteric sclerae. MMM  Resp:  CTA bilaterally, no wheezing or rales. No accessory muscle use  CV:  Regular  rhythm,  edema  GI:  Soft, Non distended, Non tender.   +Bowel sounds  Neurologic:  Alert and oriented X 3, normal speech,   Psych:   Not anxious nor agitated  Skin:  No rashes. No jaundice    Reviewed most current lab test results and cultures  YES  Reviewed most current radiology test results   YES  Review and summation of old records today    NO  Reviewed patient's current orders and MAR    YES  PMH/SH reviewed - no change compared to H&P  ________________________________________________________________________  Care Plan discussed with:    Comments   Patient y    Family      RN y    Care Manager     Consultant                        Multidiciplinary team rounds were held today with , nursing, pharmacist and clinical coordinator. Patient's plan of care was discussed; medications were reviewed and discharge planning was addressed. ________________________________________________________________________  Total NON critical care TIME: 38   Minutes    Total CRITICAL CARE TIME Spent:   Minutes non procedure based      Comments   >50% of visit spent in counseling and coordination of care     ________________________________________________________________________  Mayte Araya MD     Procedures: see electronic medical records for all procedures/Xrays and details which were not copied into this note but were reviewed prior to creation of Plan. LABS:  I reviewed today's most current labs and imaging studies.   Pertinent labs include:  Recent Labs     06/08/22 0615 06/07/22  0925   WBC 12.5* 13.3*   HGB 7.4* 7.2*   HCT 25.5* 25.0*   * 121*     Recent Labs     06/08/22 0615 06/06/22  0353    144   K 3.3* 3.3*    104   CO2 39* 31   * 165*   BUN 24* 29*   CREA 0.76 0.82   CA 8.3* 8.3*   MG  --  2.3   PHOS  --  2.9   ALB  --  2.2*   TBILI  --  0.7   ALT  --  40       Signed: Mayte Araya MD

## 2022-06-09 NOTE — PROGRESS NOTES
1915: Bedside and Verbal shift change report given to zachary (oncoming nurse) by estelita (offgoing nurse). Report included the following information SBAR, Kardex, Intake/Output, MAR, Recent Results and Cardiac Rhythm nsr-st w/ pvcs. 2150: administering pt PO meds, placed on 6L NC, tolerating well o2 sats maintaining above 90, this rn will monitor pt on nasal o2        End of Shift Note    Bedside shift change report given to doni (oncoming nurse) by Temo Patel (offgoing nurse). Report included the following information SBAR, Kardex, Intake/Output, MAR, Recent Results and Cardiac Rhythm nsr/st    Shift worked:  night     Shift summary and any significant changes:     tolerated 6L NC during trial      Concerns for physician to address:  o2     Zone phone for oncoming shift:                       Tomy, RN

## 2022-06-09 NOTE — PROGRESS NOTES
End of Shift Note    Bedside shift change report given to Charley Barnse RN (oncoming nurse) by Zaria Lua RN (offgoing nurse). Report included the following information SBAR, Kardex, MAR and Recent Results    Shift worked:  7a-7p     Shift summary and any significant changes:     Frequent soft bowel movements. Monitor pt for fever may need to rule out c.diff     Concerns for physician to address:  n/a     Zone phone for oncoming shift:          Activity:  Activity Level: Bed Rest  Number times ambulated in hallways past shift: 0  Number of times OOB to chair past shift: 0    Cardiac:   Cardiac Monitoring: Yes      Cardiac Rhythm: Sinus Tachy    Access:   Current line(s): PIV     Genitourinary:   Urinary status: voiding    Respiratory:   O2 Device: Nasal cannula  Chronic home O2 use?: YES  Incentive spirometer at bedside: NO       GI:  Last Bowel Movement Date: 06/09/22  Current diet:  ADULT DIET Dysphagia - Soft & Bite Sized  ADULT ORAL NUTRITION SUPPLEMENT Breakfast, Dinner; Renal Supplement  Passing flatus: YES  Tolerating current diet: YES       Pain Management:   Patient states pain is manageable on current regimen: YES    Skin:  Gaston Score: 12  Interventions: turn team, speciality bed, float heels, foam dressing, PT/OT consult, limit briefs, internal/external urinary devices and nutritional support     Patient Safety:  Fall Score:  Total Score: 2  Interventions: bed/chair alarm, gripper socks and pt to call before getting OOB  High Fall Risk: Yes    Length of Stay:  Expected LOS: 3d 14h  Actual LOS: [address] [facility], RN

## 2022-06-09 NOTE — PROGRESS NOTES
Infectious Disease progress      Impression  Chronic displaced femoral neck fracture of the left hip  Possible Osteomyelitis of the left hip  Nonambulatory for greater than 2 years  MRI - pelvis- 5/3- L femoral neck displaced fracture with fluid in the  fracture gap and edema/enhancement & surrounding marrow , soft tissues. Findings may reflect a healing fracture with possible superimposed  osteomyelitis/osteonecrosis. Right sacral and bilateral parasymphyseal pubic insufficiency fractures. RIGHT femoral head and LEFT greater trochanter nondisplaced fractures. S/p evaluation by Ortho  No plans for Surgery per Ortho  Recommendations for antibiotics. Patient continuing with antibiotic therapy  Currently on Vancomycin, Zosyn antibiotic end date 6/16    S/p Fluid aspiration by IR on 5/5  Cultures-NG. No organisms on GS  ( pt has been on Levaquin since 5/4 , may affect final culture results)    Chronic sacral & buttock wounds    Acute on chronic hypoxic, hypercapnic respiratory failure  Back on BiPAP      Morbid obesity   BMI 54    ESR 64( was 58, 60)   CRP 6.2( was 7.4, 8.65)    Plan  -Patient is  being treated for septic arthritis/ acute OM x 6 weeks antimicrobials end date 6/16. She was on Vancomycin and Zosyn which are potentially nephrotoxic antibiotics. Patient is also being actively diuresed. Currently on Lasix 40 mg IV twice daily. This combination of medications places her at increased risk of nephrotoxicity. -MRSA nares culture is  negative  DC vancomycin, continue Zosyn IV end date 6/16. (Patient has been on MRSA coverage x 4 weeks. )  - D/w Dr Ann Wolfe. Possible adverse effects of long term antibiotics are inclusive of but not limited to following  BM suppression, neutropenia , cytopenias , aplastic anemia hemorrhage liver & renal dysfunction/ liver , renal failure  , GI dysfunction- N, V  Diarrhea,C.difficile disease, rash , allergy , anaphylaxis. toxic epidermal necrolysis  Neuro toxicity , seizure disorder  Side effects tend to be more pronounced in the elderly. D/w pt. Pt seen today. awake, on 4+ L O2  D/w RN events of the day.    Tima Teresa  is a 61/F with medical history significant for acute on chronic respiratory failure , atrial fibrillation chronic fracture left hip , currently being treated as osteomyelitis/septic arthritis of left hip based on MRI findings. Patient was  admitted on 05/23 for acute on chronic hypercapnic respiratory failure. Initially admitted to ICU and then transferred to step down on 05/24 after treating with BiPAP. Thereafter transferred back to ICU. Patient seen today on stepdown. ID service has been reconsulted for antibiotic recommendations. Plan from previous hospitalzation was for vancomycin/cefepime IV x6 weeks end date 6/16, to be switched to daptomycin on discharge. Patient had developed adverse effects to cefepime. Daptomycin was not covered by SNF.   Patient is currently on Vancomycin and Zosyn IV     Past Medical History:   Diagnosis Date    Acute on chronic respiratory failure with hypoxia and hypercapnia (HCC) 11/20/2020    Atrial fibrillation ([facility] [address] 11/4/2020    [facility]     Diabetes ([facility] [address]     Hypertension     Ill-defined condition     high cholesterol    Ill-defined condition     tachycardia    JOSLYN (obstructive sleep apnea) 11/20/2020    Other and unspecified symptoms and signs involving general sensations and perceptions     ruptured disc    S/P cardiac cath 11/4/2020    11/3/2020 nonobstructive disease     Past Surgical History:   Procedure Laterality Date    FL COMPRE ELECTROPHYSIOL XM W/LEFT ATRIAL PACNG/REC N/A 11/23/2020    Lt Atrial Pace & Record During Ep Study performed by Alana Bonner MD at [facility] CARDIAC CATH LAB    FL ROBLESE EP EVAL ABLTJ 3D MAPG TX SVT N/A 11/23/2020    ABLATION A-FLUTTER performed by Alana Bonner MD at [facility] CARDIAC CATH LAB    FL ICAR CATHETER ABLATION ARRHYTHMIA ADD ON N/A 11/23/2020    Ablation Svt/Vt Add On performed by Natalio Lackey MD at [facility] CARDIAC CATH LAB    VA INTRACARDIAC ELECTROPHYSIOLOGIC 3D MAPPING N/A 11/23/2020    Ep 3d Mapping performed by Natalio Lackey MD at [facility] CARDIAC CATH LAB    VA STIM/PACING HEART POST IV DRUG INFU N/A 11/23/2020    Drug Stimulation performed by Natalio Lackey MD at [facility] CARDIAC CATH LAB     Allergies   Allergen Reactions    Cefepime Rash    Cefazolin Rash    Other Plant, Animal, Environmental Rash     Ivory soap caused rash on hands. Social History     Socioeconomic History    Marital status: SINGLE     Spouse name: Not on file    Number of children: Not on file    Years of education: Not on file    Highest education level: Not on file   Occupational History    Not on file   Tobacco Use    Smoking status: Former Smoker    Smokeless tobacco: Never Used   Substance and Sexual Activity    Alcohol use: No    Drug use: No    Sexual activity: Not on file   Other Topics Concern    Not on file   Social History Narrative    Not on file     Social Determinants of Health     Financial Resource Strain:     Difficulty of Paying Living Expenses: Not on file   Food Insecurity:     Worried About [facility] in the Last Year: Not on file    Quita of Food in the Last Year: Not on file   Transportation Needs:     Lack of Transportation (Medical): Not on file    Lack of Transportation (Non-Medical):  Not on file   Physical Activity:     Days of Exercise per Week: Not on file    Minutes of Exercise per Session: Not on file   Stress:     Feeling of Stress : Not on file   Social Connections:     Frequency of Communication with Friends and Family: Not on file    Frequency of Social Gatherings with Friends and Family: Not on file    Attends Tenriism Services: Not on file    Active Member of Clubs or Organizations: Not on file    Attends Club or Organization Meetings: Not on file  Marital Status: Not on file   Intimate Partner Violence:     Fear of Current or Ex-Partner: Not on file    Emotionally Abused: Not on file    Physically Abused: Not on file    Sexually Abused: Not on file   Housing Stability:     Unable to Pay for Housing in the Last Year: Not on file    Number of Places Lived in the Last Year: Not on file    Unstable Housing in the Last Year: Not on file     No family status information on file. Medication Documentation Review Audit    **Prior to Admission medications have not yet been reviewed for this encounter**         Review of Systems - Negative except those mentioned in H&P  PHYSICAL EXAM:  General:          Awake, cooperative, no acute distress    EENT:              EOMI. Anicteric sclerae. MMM  Resp:               CTA bilaterally, no wheezing or rales. No accessory muscle use  CV:                  Regular  rhythm,  No edema  GI:                   Soft, Non distended, Non tender. +Bowel sounds  Neurologic:      Alert and oriented X 3, normal speech,   Psych:             Good insight. Not anxious nor agitated  Skin:                No rashes. No jaundice.       Mccomb Pulse, MD Wolfe

## 2022-06-09 NOTE — PROGRESS NOTES
Problem: Pressure Injury - Risk of  Goal: *Prevention of pressure injury  Description: Document Gaston Scale and appropriate interventions in the flowsheet. Outcome: Progressing Towards Goal  Note: Pressure Injury Interventions:  Sensory Interventions: Assess changes in LOC,Check visual cues for pain,Discuss PT/OT consult with provider,Keep linens dry and wrinkle-free,Maintain/enhance activity level,Float heels,Minimize linen layers,Pressure redistribution bed/mattress (bed type),Turn and reposition approx. every two hours (pillows and wedges if needed)    Moisture Interventions: Absorbent underpads,Internal/External urinary devices,Apply protective barrier, creams and emollients,Check for incontinence Q2 hours and as needed,Minimize layers    Activity Interventions: Pressure redistribution bed/mattress(bed type),PT/OT evaluation,Increase time out of bed,Assess need for specialty bed    Mobility Interventions: Float heels,Pressure redistribution bed/mattress (bed type),PT/OT evaluation,Turn and reposition approx. every two hours(pillow and wedges),Assess need for specialty bed    Nutrition Interventions: Document food/fluid/supplement intake,Discuss nutritional consult with provider    Friction and Shear Interventions: Lift sheet,Minimize layers,Foam dressings/transparent film/skin sealants,Apply protective barrier, creams and emollients,Feet elevated on foot rest                Problem: Falls - Risk of  Goal: *Absence of Falls  Description: Document Anna Fall Risk and appropriate interventions in the flowsheet.   Outcome: Progressing Towards Goal  Note: Fall Risk Interventions:  Mobility Interventions: Bed/chair exit alarm,OT consult for ADLs,Patient to call before getting OOB,PT Consult for mobility concerns,PT Consult for assist device competence    Mentation Interventions: Bed/chair exit alarm    Medication Interventions: Bed/chair exit alarm    Elimination Interventions: Call light in reach,Bed/chair exit alarm,Patient to call for help with toileting needs,Stay With Me (per policy)              Problem: Infection - Risk of, Central Venous Catheter-Associated Bloodstream Infection  Goal: *Absence of infection signs and symptoms  Outcome: Progressing Towards Goal

## 2022-06-09 NOTE — PROGRESS NOTES
Problem: Pressure Injury - Risk of  Goal: *Prevention of pressure injury  Description: Document Gaston Scale and appropriate interventions in the flowsheet.   Outcome: Progressing Towards Goal  Note: Pressure Injury Interventions:  Sensory Interventions: Assess changes in LOC    Moisture Interventions: Absorbent underpads    Activity Interventions: Pressure redistribution bed/mattress(bed type)    Mobility Interventions: Float heels    Nutrition Interventions: Document food/fluid/supplement intake    Friction and Shear Interventions: Lift sheet

## 2022-06-09 NOTE — PROGRESS NOTES
Transition of Care Plan:     RUR:  24% high risk for readmission  Disposition:  Return to SNF at [facility]  Follow up appointments:  To be arranged by SNF  DME needed:  Trilogy machine (being ordered by SNF, settings faxed on 5/31/2022) Spoke with bharat on 6.7.22 and she states they will be getting the trilogy machine from the daughter's home. [facility] or means to access home:  Family has access      IM Medicare Letter:  2nd [facility] [facility] letter needed prior to discharge  Is patient a BCPI-A Bundle: n/a                    If yes, was Bundle Letter given?:    Is patient a  and connected with the [facility]? n/a               If yes, was [employer] transfer form completed and [facility] notified? Caregiver Contact: Sister Sherlean Klinefelter [phone]/ Daughter Rosalee Remy ([phone])  Discharge Caregiver contacted prior to discharge? Caregiver to be contacted prior to [facility] needed?: not needed at this time [address] with bharat at [facility] and she states she went to get the trilogy from the daughter on yesterday and it is at the facility in working order. Amira Thao  RN BSN CRM        [phone]

## 2022-06-09 NOTE — PROGRESS NOTES
Problem: Pressure Injury - Risk of  Goal: *Prevention of pressure injury  Description: Document Gaston Scale and appropriate interventions in the flowsheet.   6/9/2022 1425 by Gigi Almendarez RN  Outcome: Progressing Towards Goal  Note: Pressure Injury Interventions:  Sensory Interventions: Assess changes in LOC    Moisture Interventions: Absorbent underpads    Activity Interventions: Pressure redistribution bed/mattress(bed type)    Mobility Interventions: Float heels    Nutrition Interventions: Document food/fluid/supplement intake    Friction and Shear Interventions: Lift sheet             6/9/2022 0839 by Gigi Almendarez RN  Outcome: Progressing Towards Goal  Note: Pressure Injury Interventions:  Sensory Interventions: Assess changes in LOC    Moisture Interventions: Absorbent underpads    Activity Interventions: Pressure redistribution bed/mattress(bed type)    Mobility Interventions: Float heels    Nutrition Interventions: Document food/fluid/supplement intake    Friction and Shear Interventions: Lift sheet

## 2022-06-10 LAB
ANION GAP SERPL CALC-SCNC: 3 MMOL/L (ref 5–15)
BUN SERPL-MCNC: 16 MG/DL (ref 6–20)
BUN/CREAT SERPL: 24 (ref 12–20)
CALCIUM SERPL-MCNC: 8.2 MG/DL (ref 8.5–10.1)
CHLORIDE SERPL-SCNC: 99 MMOL/L (ref 97–108)
CK SERPL-CCNC: <7 U/L (ref 26–192)
CO2 SERPL-SCNC: 40 MMOL/L (ref 21–32)
CREAT SERPL-MCNC: 0.67 MG/DL (ref 0.55–1.02)
CYSTATIN C SERPL-MCNC: 1.82 MG/L (ref 0.72–1.16)
ERYTHROCYTE [DISTWIDTH] IN BLOOD BY AUTOMATED COUNT: 18.1 % (ref 11.5–14.5)
GLUCOSE BLD STRIP.AUTO-MCNC: 132 MG/DL (ref 65–117)
GLUCOSE BLD STRIP.AUTO-MCNC: 161 MG/DL (ref 65–117)
GLUCOSE BLD STRIP.AUTO-MCNC: 192 MG/DL (ref 65–117)
GLUCOSE BLD STRIP.AUTO-MCNC: 199 MG/DL (ref 65–117)
GLUCOSE SERPL-MCNC: 112 MG/DL (ref 65–100)
HCT VFR BLD AUTO: 24.8 % (ref 35–47)
HGB BLD-MCNC: 7.2 G/DL (ref 11.5–16)
MCH RBC QN AUTO: 29.9 PG (ref 26–34)
MCHC RBC AUTO-ENTMCNC: 29 G/DL (ref 30–36.5)
MCV RBC AUTO: 102.9 FL (ref 80–99)
NRBC # BLD: 0.02 K/UL (ref 0–0.01)
NRBC BLD-RTO: 0.2 PER 100 WBC
PLATELET # BLD AUTO: 84 K/UL (ref 150–400)
PMV BLD AUTO: 11.9 FL (ref 8.9–12.9)
POTASSIUM SERPL-SCNC: 3.5 MMOL/L (ref 3.5–5.1)
RBC # BLD AUTO: 2.41 M/UL (ref 3.8–5.2)
SERVICE CMNT-IMP: ABNORMAL
SODIUM SERPL-SCNC: 142 MMOL/L (ref 136–145)
WBC # BLD AUTO: 9.2 K/UL (ref 3.6–11)

## 2022-06-10 PROCEDURE — 80048 BASIC METABOLIC PNL TOTAL CA: CPT

## 2022-06-10 PROCEDURE — 99233 SBSQ HOSP IP/OBS HIGH 50: CPT | Performed by: INTERNAL MEDICINE

## 2022-06-10 PROCEDURE — 74011250636 HC RX REV CODE- 250/636: Performed by: INTERNAL MEDICINE

## 2022-06-10 PROCEDURE — 74011000250 HC RX REV CODE- 250: Performed by: HOSPITALIST

## 2022-06-10 PROCEDURE — 74011250636 HC RX REV CODE- 250/636: Performed by: STUDENT IN AN ORGANIZED HEALTH CARE EDUCATION/TRAINING PROGRAM

## 2022-06-10 PROCEDURE — 85027 COMPLETE CBC AUTOMATED: CPT

## 2022-06-10 PROCEDURE — 65270000046 HC RM TELEMETRY

## 2022-06-10 PROCEDURE — 74011000258 HC RX REV CODE- 258: Performed by: STUDENT IN AN ORGANIZED HEALTH CARE EDUCATION/TRAINING PROGRAM

## 2022-06-10 PROCEDURE — 77010033678 HC OXYGEN DAILY

## 2022-06-10 PROCEDURE — 74011636637 HC RX REV CODE- 636/637: Performed by: HOSPITALIST

## 2022-06-10 PROCEDURE — 94660 CPAP INITIATION&MGMT: CPT

## 2022-06-10 PROCEDURE — 74011250637 HC RX REV CODE- 250/637: Performed by: INTERNAL MEDICINE

## 2022-06-10 PROCEDURE — 74011000250 HC RX REV CODE- 250: Performed by: INTERNAL MEDICINE

## 2022-06-10 PROCEDURE — 82550 ASSAY OF CK (CPK): CPT

## 2022-06-10 PROCEDURE — 36415 COLL VENOUS BLD VENIPUNCTURE: CPT

## 2022-06-10 PROCEDURE — 74011250637 HC RX REV CODE- 250/637: Performed by: GENERAL ACUTE CARE HOSPITAL

## 2022-06-10 PROCEDURE — 82962 GLUCOSE BLOOD TEST: CPT

## 2022-06-10 PROCEDURE — 94640 AIRWAY INHALATION TREATMENT: CPT

## 2022-06-10 PROCEDURE — 74011250637 HC RX REV CODE- 250/637: Performed by: HOSPITALIST

## 2022-06-10 RX ORDER — DEXTROMETHORPHAN/PSEUDOEPHED 2.5-7.5/.8
1.2 DROPS ORAL
Status: DISCONTINUED | OUTPATIENT
Start: 2022-06-10 | End: 2022-06-17 | Stop reason: HOSPADM

## 2022-06-10 RX ORDER — FLUMAZENIL 0.1 MG/ML
0.2 INJECTION INTRAVENOUS
Status: ACTIVE | OUTPATIENT
Start: 2022-06-10 | End: 2022-06-10

## 2022-06-10 RX ORDER — NALOXONE HYDROCHLORIDE 0.4 MG/ML
0.4 INJECTION, SOLUTION INTRAMUSCULAR; INTRAVENOUS; SUBCUTANEOUS
Status: ACTIVE | OUTPATIENT
Start: 2022-06-10 | End: 2022-06-10

## 2022-06-10 RX ORDER — MIDAZOLAM HYDROCHLORIDE 1 MG/ML
.25-5 INJECTION, SOLUTION INTRAMUSCULAR; INTRAVENOUS
Status: ACTIVE | OUTPATIENT
Start: 2022-06-10 | End: 2022-06-10

## 2022-06-10 RX ORDER — ATROPINE SULFATE 0.1 MG/ML
0.5 INJECTION INTRAVENOUS
Status: ACTIVE | OUTPATIENT
Start: 2022-06-10 | End: 2022-06-11

## 2022-06-10 RX ORDER — EPINEPHRINE 0.1 MG/ML
1 INJECTION INTRACARDIAC; INTRAVENOUS
Status: ACTIVE | OUTPATIENT
Start: 2022-06-10 | End: 2022-06-11

## 2022-06-10 RX ADMIN — GABAPENTIN 100 MG: 100 CAPSULE ORAL at 21:22

## 2022-06-10 RX ADMIN — IPRATROPIUM BROMIDE AND ALBUTEROL SULFATE 3 ML: .5; 3 SOLUTION RESPIRATORY (INHALATION) at 19:53

## 2022-06-10 RX ADMIN — IPRATROPIUM BROMIDE AND ALBUTEROL SULFATE 3 ML: .5; 3 SOLUTION RESPIRATORY (INHALATION) at 07:22

## 2022-06-10 RX ADMIN — SODIUM CHLORIDE, PRESERVATIVE FREE 10 ML: 5 INJECTION INTRAVENOUS at 06:40

## 2022-06-10 RX ADMIN — PIPERACILLIN AND TAZOBACTAM 3.38 G: 3; .375 INJECTION, POWDER, LYOPHILIZED, FOR SOLUTION INTRAVENOUS at 17:11

## 2022-06-10 RX ADMIN — AMIODARONE HYDROCHLORIDE 200 MG: 200 TABLET ORAL at 08:29

## 2022-06-10 RX ADMIN — Medication 1 CAPSULE: at 08:28

## 2022-06-10 RX ADMIN — SODIUM CHLORIDE, PRESERVATIVE FREE 10 ML: 5 INJECTION INTRAVENOUS at 17:11

## 2022-06-10 RX ADMIN — SUCRALFATE 1 G: 1 TABLET ORAL at 11:41

## 2022-06-10 RX ADMIN — SUCRALFATE 1 G: 1 TABLET ORAL at 08:29

## 2022-06-10 RX ADMIN — CASTOR OIL AND BALSAM, PERU: 788; 87 OINTMENT TOPICAL at 21:21

## 2022-06-10 RX ADMIN — GABAPENTIN 100 MG: 100 CAPSULE ORAL at 17:11

## 2022-06-10 RX ADMIN — POTASSIUM CHLORIDE 40 MEQ: 20 TABLET, EXTENDED RELEASE ORAL at 08:29

## 2022-06-10 RX ADMIN — PIPERACILLIN AND TAZOBACTAM 3.38 G: 3; .375 INJECTION, POWDER, LYOPHILIZED, FOR SOLUTION INTRAVENOUS at 21:22

## 2022-06-10 RX ADMIN — Medication: at 08:29

## 2022-06-10 RX ADMIN — MICONAZOLE NITRATE: 20 CREAM TOPICAL at 08:29

## 2022-06-10 RX ADMIN — FUROSEMIDE 40 MG: 10 INJECTION, SOLUTION INTRAMUSCULAR; INTRAVENOUS at 17:11

## 2022-06-10 RX ADMIN — Medication 2 UNITS: at 17:20

## 2022-06-10 RX ADMIN — RIVAROXABAN 20 MG: 20 TABLET, FILM COATED ORAL at 08:29

## 2022-06-10 RX ADMIN — SUCRALFATE 1 G: 1 TABLET ORAL at 17:11

## 2022-06-10 RX ADMIN — Medication 2 UNITS: at 11:41

## 2022-06-10 RX ADMIN — FUROSEMIDE 40 MG: 10 INJECTION, SOLUTION INTRAMUSCULAR; INTRAVENOUS at 08:29

## 2022-06-10 RX ADMIN — GABAPENTIN 100 MG: 100 CAPSULE ORAL at 08:28

## 2022-06-10 RX ADMIN — PANTOPRAZOLE SODIUM 40 MG: 40 TABLET, DELAYED RELEASE ORAL at 08:29

## 2022-06-10 RX ADMIN — PIPERACILLIN AND TAZOBACTAM 3.38 G: 3; .375 INJECTION, POWDER, LYOPHILIZED, FOR SOLUTION INTRAVENOUS at 06:41

## 2022-06-10 RX ADMIN — CASTOR OIL AND BALSAM, PERU: 788; 87 OINTMENT TOPICAL at 08:29

## 2022-06-10 RX ADMIN — MICONAZOLE NITRATE: 20 CREAM TOPICAL at 21:22

## 2022-06-10 RX ADMIN — SODIUM CHLORIDE, PRESERVATIVE FREE 10 ML: 5 INJECTION INTRAVENOUS at 21:23

## 2022-06-10 NOTE — PROGRESS NOTES
Infectious Disease progress      Impression  Chronic displaced femoral neck fracture of the left hip  Possible Osteomyelitis of the left hip  Nonambulatory for greater than 2 years  MRI - pelvis- 5/3- L femoral neck displaced fracture with fluid in the  fracture gap and edema/enhancement & surrounding marrow , soft tissues. Findings may reflect a healing fracture with possible superimposed  osteomyelitis/osteonecrosis. Right sacral and bilateral parasymphyseal pubic insufficiency fractures. RIGHT femoral head and LEFT greater trochanter nondisplaced fractures. S/p evaluation by Ortho  No plans for Surgery per Ortho  Recommendations for antibiotics. Patient continuing with antibiotic therapy  Currently on Vancomycin, Zosyn antibiotic end date 6/16    S/p Fluid aspiration by IR on 5/5  Cultures-NG. No organisms on GS  ( pt has been on Levaquin since 5/4 , may affect final culture results)    Chronic sacral & buttock wounds    Acute on chronic hypoxic, hypercapnic respiratory failure  Back on BiPAP      Morbid obesity   BMI 54    ESR 64( was 58, 60)   CRP 6.2( was 7.4, 8.65)    Plan  -Continue Zosyn IV end date 6/16.  -Continue probiotic  -Please contact ID on call with questions, concerns over the weekend. Possible adverse effects of long term antibiotics are inclusive of but not limited to following  BM suppression, neutropenia , cytopenias , aplastic anemia hemorrhage liver & renal dysfunction/ liver , renal failure  , GI dysfunction- N, V  Diarrhea,C.difficile disease, rash , allergy , anaphylaxis. toxic epidermal necrolysis  Neuro toxicity , seizure disorder  Side effects tend to be more pronounced in the elderly. D/w pt. Pt seen today. Awake, on 4+ L O2. Eating by herself. Denies new complaints  L/hip pain less.  Diarrhea yesterday, none today    Malou Snyder  is a 61/F with medical history significant for acute on chronic respiratory failure , atrial fibrillation chronic fracture left hip , currently being treated as osteomyelitis/septic arthritis of left hip based on MRI findings. Patient was  admitted on 05/23 for acute on chronic hypercapnic respiratory failure. Initially admitted to ICU and then transferred to step down on 05/24 after treating with BiPAP. Thereafter transferred back to ICU. Patient seen today on stepdown. ID service has been reconsulted for antibiotic recommendations. Plan from previous hospitalzation was for vancomycin/cefepime IV x6 weeks end date 6/16, to be switched to daptomycin on discharge. Patient had developed adverse effects to cefepime. Daptomycin was not covered by SNF.   Patient is currently on Vancomycin and Zosyn IV     Past Medical History:   Diagnosis Date    Acute on chronic respiratory failure with hypoxia and hypercapnia ([facility]) 11/20/2020    Atrial fibrillation ([facility] [address] 11/4/2020    CHF (congestive heart failure) ([facility])     Diabetes ([facility] [address]     Hypertension     Ill-defined condition     high cholesterol    Ill-defined condition     tachycardia    JOSLYN (obstructive sleep apnea) 11/20/2020    Other and unspecified symptoms and signs involving general sensations and perceptions     ruptured disc    S/P cardiac cath 11/4/2020    11/3/2020 nonobstructive disease     Past Surgical History:   Procedure Laterality Date    TN COMPRE ELECTROPHYSIOL XM W/LEFT ATRIAL PACNG/REC N/A 11/23/2020    Lt Atrial Pace & Record During Ep Study performed by Raúl John MD at [facility] CARDIAC CATH LAB    TN COMPRE EP EVAL ABLTJ 3D MAPG TX SVT N/A 11/23/2020    ABLATION A-FLUTTER performed by Raúl John MD at [facility] CARDIAC CATH LAB     [address] ADD ON N/A 11/23/2020    Ablation Svt/Vt Add On performed by Raúl John MD at [facility] CARDIAC CATH LAB    TN INTRACARDIAC ELECTROPHYSIOLOGIC 3D MAPPING N/A 11/23/2020    Ep 3d Mapping performed by Raúl John MD at [facility] CARDIAC CATH LAB    TN STIM/PACING HEART POST IV DRUG INFU N/A 11/23/2020    Drug Stimulation performed by Lauren Cavazos MD at [facility] CARDIAC CATH LAB     Allergies   Allergen Reactions    Cefepime Rash    Cefazolin Rash    Other Plant, Animal, Environmental Rash     Ivory soap caused rash on hands. Social History     Socioeconomic History    Marital status: SINGLE     Spouse name: Not on file    Number of children: Not on file    Years of education: Not on file    Highest education level: Not on file   Occupational History    Not on file   Tobacco Use    Smoking status: Former Smoker    Smokeless tobacco: Never Used   Substance and Sexual Activity    Alcohol use: No    Drug use: No    Sexual activity: Not on file   Other Topics Concern    Not on file   Social History Narrative    Not on file     Social Determinants of Health     Financial Resource Strain:     Difficulty of Paying Living Expenses: Not on file   Food Insecurity:     Worried About [address] in the Last Year: Not on file    Quita of Food in the Last Year: Not on file   Transportation Needs:     Lack of Transportation (Medical): Not on file    Lack of Transportation (Non-Medical):  Not on file   Physical Activity:     Days of Exercise per Week: Not on file    Minutes of Exercise per Session: Not on file   Stress:     Feeling of Stress : Not on file   Social Connections:     Frequency of Communication with Friends and Family: Not on file    Frequency of Social Gatherings with Friends and Family: Not on file    Attends Orthodox Services: Not on file    Active Member of Clubs or Organizations: Not on file    Attends Club or Organization Meetings: Not on file    Marital Status: Not on file   Intimate Partner Violence:     Fear of Current or Ex-Partner: Not on file    Emotionally Abused: Not on file    Physically Abused: Not on file    Sexually Abused: Not on file   Housing Stability:     Unable to Pay for Housing in the Last Year: Not on file    Number of Places Lived in the Last Year: Not on file    Unstable Housing in the Last Year: Not on file     No family status information on file. Medication Documentation Review Audit    **Prior to Admission medications have not yet been reviewed for this encounter**         Review of Systems - Negative except those mentioned in H&P  PHYSICAL EXAM:  General:          Awake, cooperative, no acute distress    EENT:              EOMI. Anicteric sclerae. MMM  Resp:               CTA bilaterally, no wheezing or rales. No accessory muscle use  CV:                  Regular  rhythm,  No edema  GI:                   Soft, Non distended, Non tender. +Bowel sounds  Neurologic:      Alert and oriented X 3, normal speech,   Psych:             Good insight. Not anxious nor agitated  Skin:                No rashes. No jaundice.       Yu Mack MD [address]

## 2022-06-10 NOTE — PROGRESS NOTES
Hospitalist Progress Note    NAME: Didi Lance   :  1960   MRN:  [id number]       Assessment / Plan:  Acute on chronic hypoxic/hypercapnic respiratory failure-resolved  Left-sided pleural effusion thoracentesis (too small to tap)  JOSLYN/OHS on trilogy  COPD on O2  Was transferred out of ICU and went back d/t acute hypercapnic respiratory failure, improved with bipap, now on 3 liters, using Trilogy at night. Tapering oral steroids, on 20 mg, last dose tomorrow  Continue trilogy device as per pulmonary. CM working for [employer] at [facility]  Seen by pulmonary continue current treatment  On 3 liters of oxygen at baseline  Leukocytosis likely d/t steroids    Acute on chronic anemia:  Hb dropped gradually, received 1 unit on , was improving, some drop noted today to 7.2  Stool occult positive  Protonix 40 q12 hours  S/p EGD on 6/3, no active bleeding, biopsy sent, f/u results. Resume Xarelto today   Repeat hb in am, if further worsen, reconsult GI, if better consider resuming Xarelto. If again bleed may have to stop Xarelto altogether     Left hip fracture displaced  age undetermined  Left hip osteomyelitis/septic arthritis   Bilateral buttock and sacral wounds    -[facility] [place] evaluated no surgical intervention planned  -S/p  Aspiration?/Bx by IR, culture negative. -ID recommends daptomycin and cefepime on discharge last admit.  End date . Chayito Wells go to SNF with Daptomycin, switched to vancomycin. ID recs to check MRSA screen neg -->DC Vanco and cont Zosyn      Metabolic alkalosis  Improved, has chronic alkalosis for compensation  Would avoid diamox, as intensivist thinks this caused her to go into respiratory failure second time    Chronic diastolic heart failure/PAF:   Continue  amiodarone . IV Lasix   Holding xarelto    Steroid induced hyperglycemia-  A1c level 5.4 on , ISS    HTN   controlled.  Cont' PTA meds.     Morbid obesity/Impaired mobility/functional quadriplegia-supportive care  Drug rash- suspected from cefepime now resolved.     Hypokalemia: Resolved     DVT PRx: SCDs for now  [facility]    TOM: 6/10     Subjective:     Chief Complaint / Reason for Physician Visit  On 4 liters oxygen, desated while taking her meds  She denies any bloody bm  2 loose BMs  Afebrile  No SOB    Review of Systems:  Symptom Y/N Comments  Symptom Y/N Comments   Fever/Chills    Chest Pain     Poor Appetite    Edema     Cough    Abdominal Pain     Sputum    Joint Pain     SOB/CHOPRA    Pruritis/Rash     Nausea/vomit    Tolerating PT/OT     Diarrhea    Tolerating Diet     Constipation    Other       Could NOT obtain due to:      Objective:     VITALS:   Last 24hrs VS reviewed since prior progress note. Most recent are:  Patient Vitals for the past 24 hrs:   Temp Pulse Resp BP SpO2   06/09/22 2007 -- -- -- -- 96 %   06/09/22 1925 97.7 °F (36.5 °C) (!) 112 (!) 36 (!) 167/66 92 %   06/09/22 1608 98.7 °F (37.1 °C) (!) 108 28 116/61 93 %   06/09/22 1123 98.6 °F (37 °C) (!) 107 27 -- 92 %   06/09/22 1033 98.9 °F (37.2 °C) (!) 109 28 (!) 132/59 (!) 86 %   06/09/22 0853 -- -- -- -- 94 %   06/09/22 0824 99.2 °F (37.3 °C) 96 27 (!) 120/48 100 %   06/09/22 0821 -- -- -- -- 92 %   06/09/22 0348 -- -- -- -- 98 %   06/09/22 0304 97.9 °F (36.6 °C) 87 20 (!) 114/49 96 %   06/09/22 0000 -- -- -- -- 93 %       Intake/Output Summary (Last 24 hours) at 6/9/2022 2321  Last data filed at 6/9/2022 1139  Gross per 24 hour   Intake --   Output 2000 ml   Net -2000 ml        I had a face to face encounter and independently examined this patient on 6/9/2022, as outlined below:  PHYSICAL EXAM:  General: Awake, No acute distress  EENT:  EOMI. Anicteric sclerae. MMM  Resp:  CTA bilaterally, no wheezing or rales. No accessory muscle use  CV:  Regular  rhythm,  edema  GI:  Soft, Non distended, Non tender. +Bowel sounds  Neurologic:  Alert and oriented X 3, normal speech,   Psych:   Not anxious nor agitated  Skin:  No rashes.   No jaundice    Reviewed most current lab test results and cultures  YES  Reviewed most current radiology test results   YES  Review and summation of old records today    NO  Reviewed patient's current orders and MAR    YES  PMH/SH reviewed - no change compared to H&P  ________________________________________________________________________  Care Plan discussed with:    Comments   Patient y    Family      RN y    Care Manager     Consultant                        Multidiciplinary team rounds were held today with , nursing, pharmacist and clinical coordinator. Patient's plan of care was discussed; medications were reviewed and discharge planning was addressed. ________________________________________________________________________  Total NON critical care TIME: 30   Minutes    Total CRITICAL CARE TIME Spent:   Minutes non procedure based      Comments   >50% of visit spent in counseling and coordination of care     ________________________________________________________________________  Nan Caputo MD     Procedures: see electronic medical records for all procedures/Xrays and details which were not copied into this note but were reviewed prior to creation of Plan. LABS:  I reviewed today's most current labs and imaging studies.   Pertinent labs include:  Recent Labs     06/09/22 0635 06/08/22  0615 06/07/22  0925   WBC 9.8 12.5* 13.3*   HGB 7.3* 7.4* 7.2*   HCT 25.6* 25.5* 25.0*   PLT 86* 103* 121*     Recent Labs     06/09/22 0635 06/08/22  0615    144   K 2.9* 3.3*    100   CO2 39* 39*   * 119*   BUN 19 24*   CREA 0.74 0.76   CA 8.5 8.3*       Signed: Nan Caputo MD

## 2022-06-10 NOTE — PROGRESS NOTES
ADULT PROTOCOL: JET AEROSOL  REASSESSMENT    Patient  Blevins Channel     57 y.o.   female     6/10/2022  9:46 AM    Breath Sounds Pre Procedure: Right Breath Sounds: Diminished                               Left Breath Sounds: Diminished    Breath Sounds Post Procedure: Right Breath Sounds: Diminished                                 Left Breath Sounds: Diminished    Breathing pattern: Pre procedure Breathing Pattern: Regular          Post procedure Breathing Pattern: Regular    Heart Rate: Pre procedure Pulse: 96           Post procedure Pulse: 90    Resp Rate: Pre procedure Respirations: 22           Post procedure Respirations: 22    Cough: Pre procedure Cough: Non-productive               Post procedure Cough: Non-productive    Oxygen: 4L/NC     Changed: NO    SpO2: Pre procedure SpO2: 100 %                 Post procedure SpO2: 100 %      Nebulizer Therapy: Current medications Aerosolized Medications: DuoNeb      Changed:NO    Smoking Histor    Problem List:   Patient Active Problem List   Diagnosis Code    COVID-19 ruled out Z20.822    A-fib ([address] I48.91    S/P cardiac cath Z98.890    Mixed hyperlipidemia E78.2    Morbid obesity ([facility]) E66.01    Acute respiratory failure ([facility]) J96.00    Atrial flutter with rapid ventricular response ([facility]) I48.92    JOSLYN (obstructive sleep apnea) G47.33    Acute on chronic respiratory failure with hypoxia and hypercapnia ([facility]) J96.21, J96.22    SOB (shortness of breath) R06.02    Physical deconditioning R53.81    Counseling regarding advance care planning and goals of care Z71.89    Chronic pain G89.29    Dependence on supplemental oxygen Z99.81    Essential hypertension I10    Heart failure (HCC) I50.9    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

## 2022-06-10 NOTE — PROGRESS NOTES
End of Shift Note    Bedside shift change report given to Marzena Ramirez RN  (oncoming nurse) by Aracely Caceres RN (offgoing nurse). Report included the following information SBAR, Kardex and Cardiac Rhythm NSR    Shift worked: 7p-7a     Shift summary and any significant changes:     Pt placed on CPAP/Trilogy machine overnight. Occasionally pt would have a SaO2 fall to mid 80s to high 70s, and pt would recover within 30 seconds to 96% or above. Concerns for physician to address:  Verify appropriate settings for CPAP. Pt with orange colored uirne--medication source not identified on med review. Zone phone for oncoming shift:          Activity:  Activity Level: Bed Rest  Number times ambulated in hallways past shift: 0  Number of times OOB to chair past shift: 0    Cardiac:   Cardiac Monitoring: Yes      Cardiac Rhythm: Sinus Tachy    Access:   Current line(s): PICC     Genitourinary:   Urinary status: voiding and external catheter    Respiratory:   O2 Device: BIPAP  Chronic home O2 use?: YES  Incentive spirometer at bedside: NO       GI:  Last Bowel Movement Date: 06/09/22  Current diet:  ADULT DIET Dysphagia - Soft & Bite Sized  ADULT ORAL NUTRITION SUPPLEMENT Breakfast, Dinner; Renal Supplement  Passing flatus: YES  Tolerating current diet: YES       Pain Management:   Patient states pain is manageable on current regimen: YES    Skin:  Gaston Score: 12  Interventions: turn team, speciality bed and internal/external urinary devices    Patient Safety:  Fall Score:  Total Score: 2  Interventions: assistive device (walker, cane, etc) and gripper socks  High Fall Risk: Yes    Length of Stay:  Expected LOS: 3d 14h  Actual LOS: [address] RN

## 2022-06-11 LAB
ANION GAP SERPL CALC-SCNC: 3 MMOL/L (ref 5–15)
BUN SERPL-MCNC: 15 MG/DL (ref 6–20)
BUN/CREAT SERPL: 22 (ref 12–20)
CALCIUM SERPL-MCNC: 8.4 MG/DL (ref 8.5–10.1)
CHLORIDE SERPL-SCNC: 96 MMOL/L (ref 97–108)
CO2 SERPL-SCNC: 41 MMOL/L (ref 21–32)
COVID-19 RAPID TEST, COVR: NOT DETECTED
CREAT SERPL-MCNC: 0.68 MG/DL (ref 0.55–1.02)
GLUCOSE BLD STRIP.AUTO-MCNC: 118 MG/DL (ref 65–117)
GLUCOSE BLD STRIP.AUTO-MCNC: 171 MG/DL (ref 65–117)
GLUCOSE BLD STRIP.AUTO-MCNC: 179 MG/DL (ref 65–117)
GLUCOSE BLD STRIP.AUTO-MCNC: 192 MG/DL (ref 65–117)
GLUCOSE SERPL-MCNC: 118 MG/DL (ref 65–100)
POTASSIUM SERPL-SCNC: 3.7 MMOL/L (ref 3.5–5.1)
SERVICE CMNT-IMP: ABNORMAL
SODIUM SERPL-SCNC: 140 MMOL/L (ref 136–145)
SOURCE, COVRS: NORMAL

## 2022-06-11 PROCEDURE — 74011250637 HC RX REV CODE- 250/637: Performed by: HOSPITALIST

## 2022-06-11 PROCEDURE — 94640 AIRWAY INHALATION TREATMENT: CPT

## 2022-06-11 PROCEDURE — 87635 SARS-COV-2 COVID-19 AMP PRB: CPT

## 2022-06-11 PROCEDURE — 74011250637 HC RX REV CODE- 250/637: Performed by: INTERNAL MEDICINE

## 2022-06-11 PROCEDURE — 94660 CPAP INITIATION&MGMT: CPT

## 2022-06-11 PROCEDURE — 74011000250 HC RX REV CODE- 250: Performed by: INTERNAL MEDICINE

## 2022-06-11 PROCEDURE — 65270000046 HC RM TELEMETRY

## 2022-06-11 PROCEDURE — 74011250636 HC RX REV CODE- 250/636: Performed by: INTERNAL MEDICINE

## 2022-06-11 PROCEDURE — 36415 COLL VENOUS BLD VENIPUNCTURE: CPT

## 2022-06-11 PROCEDURE — 74011636637 HC RX REV CODE- 636/637: Performed by: HOSPITALIST

## 2022-06-11 PROCEDURE — 82962 GLUCOSE BLOOD TEST: CPT

## 2022-06-11 PROCEDURE — 80048 BASIC METABOLIC PNL TOTAL CA: CPT

## 2022-06-11 PROCEDURE — 74011250636 HC RX REV CODE- 250/636: Performed by: STUDENT IN AN ORGANIZED HEALTH CARE EDUCATION/TRAINING PROGRAM

## 2022-06-11 PROCEDURE — 74011250637 HC RX REV CODE- 250/637: Performed by: NURSE PRACTITIONER

## 2022-06-11 PROCEDURE — 74011000250 HC RX REV CODE- 250: Performed by: HOSPITALIST

## 2022-06-11 PROCEDURE — 74011000258 HC RX REV CODE- 258: Performed by: STUDENT IN AN ORGANIZED HEALTH CARE EDUCATION/TRAINING PROGRAM

## 2022-06-11 PROCEDURE — 77010033678 HC OXYGEN DAILY

## 2022-06-11 PROCEDURE — 74011250637 HC RX REV CODE- 250/637: Performed by: GENERAL ACUTE CARE HOSPITAL

## 2022-06-11 RX ORDER — FUROSEMIDE 40 MG/1
40 TABLET ORAL
Status: DISCONTINUED | OUTPATIENT
Start: 2022-06-12 | End: 2022-06-17 | Stop reason: HOSPADM

## 2022-06-11 RX ADMIN — FUROSEMIDE 40 MG: 10 INJECTION, SOLUTION INTRAMUSCULAR; INTRAVENOUS at 17:20

## 2022-06-11 RX ADMIN — Medication 2 UNITS: at 17:20

## 2022-06-11 RX ADMIN — PIPERACILLIN AND TAZOBACTAM 3.38 G: 3; .375 INJECTION, POWDER, LYOPHILIZED, FOR SOLUTION INTRAVENOUS at 06:03

## 2022-06-11 RX ADMIN — DIPHENHYDRAMINE HYDROCHLORIDE 25 MG: 25 CAPSULE ORAL at 12:00

## 2022-06-11 RX ADMIN — Medication: at 17:21

## 2022-06-11 RX ADMIN — PIPERACILLIN AND TAZOBACTAM 3.38 G: 3; .375 INJECTION, POWDER, LYOPHILIZED, FOR SOLUTION INTRAVENOUS at 21:00

## 2022-06-11 RX ADMIN — PIPERACILLIN AND TAZOBACTAM 3.38 G: 3; .375 INJECTION, POWDER, LYOPHILIZED, FOR SOLUTION INTRAVENOUS at 15:49

## 2022-06-11 RX ADMIN — SUCRALFATE 1 G: 1 TABLET ORAL at 12:00

## 2022-06-11 RX ADMIN — SUCRALFATE 1 G: 1 TABLET ORAL at 17:20

## 2022-06-11 RX ADMIN — POTASSIUM CHLORIDE 40 MEQ: 20 TABLET, EXTENDED RELEASE ORAL at 08:21

## 2022-06-11 RX ADMIN — IPRATROPIUM BROMIDE AND ALBUTEROL SULFATE 3 ML: .5; 3 SOLUTION RESPIRATORY (INHALATION) at 20:17

## 2022-06-11 RX ADMIN — Medication 1 CAPSULE: at 08:21

## 2022-06-11 RX ADMIN — GABAPENTIN 100 MG: 100 CAPSULE ORAL at 08:21

## 2022-06-11 RX ADMIN — RIVAROXABAN 20 MG: 20 TABLET, FILM COATED ORAL at 08:21

## 2022-06-11 RX ADMIN — DIPHENHYDRAMINE HYDROCHLORIDE 25 MG: 25 CAPSULE ORAL at 21:00

## 2022-06-11 RX ADMIN — DIPHENHYDRAMINE HYDROCHLORIDE, ZINC ACETATE: 2; .1 CREAM TOPICAL at 20:48

## 2022-06-11 RX ADMIN — GABAPENTIN 100 MG: 100 CAPSULE ORAL at 21:00

## 2022-06-11 RX ADMIN — CASTOR OIL AND BALSAM, PERU: 788; 87 OINTMENT TOPICAL at 20:49

## 2022-06-11 RX ADMIN — SODIUM CHLORIDE, PRESERVATIVE FREE 10 ML: 5 INJECTION INTRAVENOUS at 21:00

## 2022-06-11 RX ADMIN — Medication: at 08:21

## 2022-06-11 RX ADMIN — AMIODARONE HYDROCHLORIDE 200 MG: 200 TABLET ORAL at 08:21

## 2022-06-11 RX ADMIN — CASTOR OIL AND BALSAM, PERU: 788; 87 OINTMENT TOPICAL at 08:24

## 2022-06-11 RX ADMIN — MICONAZOLE NITRATE: 20 CREAM TOPICAL at 20:49

## 2022-06-11 RX ADMIN — IPRATROPIUM BROMIDE AND ALBUTEROL SULFATE 3 ML: .5; 3 SOLUTION RESPIRATORY (INHALATION) at 07:51

## 2022-06-11 RX ADMIN — PANTOPRAZOLE SODIUM 40 MG: 40 TABLET, DELAYED RELEASE ORAL at 08:21

## 2022-06-11 RX ADMIN — SUCRALFATE 1 G: 1 TABLET ORAL at 08:21

## 2022-06-11 RX ADMIN — FUROSEMIDE 40 MG: 10 INJECTION, SOLUTION INTRAMUSCULAR; INTRAVENOUS at 08:21

## 2022-06-11 RX ADMIN — SODIUM CHLORIDE, PRESERVATIVE FREE 10 ML: 5 INJECTION INTRAVENOUS at 15:49

## 2022-06-11 RX ADMIN — MICONAZOLE NITRATE: 20 CREAM TOPICAL at 08:22

## 2022-06-11 RX ADMIN — SODIUM CHLORIDE, PRESERVATIVE FREE 10 ML: 5 INJECTION INTRAVENOUS at 06:03

## 2022-06-11 RX ADMIN — Medication 2 UNITS: at 11:59

## 2022-06-11 RX ADMIN — GABAPENTIN 100 MG: 100 CAPSULE ORAL at 17:21

## 2022-06-11 NOTE — PROGRESS NOTES
Hospitalist Progress Note    NAME: Tea Vu   :  1960   MRN:  [id number]       Assessment / Plan:  Acute on chronic hypoxic/hypercapnic respiratory failure-resolved  Left-sided pleural effusion thoracentesis (too small to tap)  JOSLYN/OHS on trilogy  COPD on O2  Was transferred out of ICU and went back d/t acute hypercapnic respiratory failure, improved with bipap, now on 3 liters, using Trilogy at night. Tapering oral steroids, on 20 mg, last dose tomorrow  Continue trilogy device as per pulmonary. CM working for [employer] at [facility]  Seen by pulmonary continue current treatment  On 3 liters of oxygen at baseline  Leukocytosis likely d/t steroids    Acute on chronic anemia:  Hb dropped gradually, received 1 unit on , was improving, some drop noted today to 7.2  Stool occult positive  Protonix 40 q12 hours  S/p EGD on 6/3, no active bleeding, biopsy sent, f/u results. Resume Xarelto   Repeat hb in am, if further worsen, reconsult GI, if better consider resuming Xarelto. If again bleed may have to stop Xarelto altogether     Left hip fracture displaced  age undetermined  Left hip osteomyelitis/septic arthritis   Bilateral buttock and sacral wounds    -[facility] [place] evaluated no surgical intervention planned  -S/p  Aspiration?/Bx by IR, culture negative. -ID recommends daptomycin and cefepime on discharge last admit.  End date . [facility] go to SNF with Daptomycin, switched to vancomycin. ID recs to check MRSA screen neg -->DC Vanco and cont Zosyn   Noted today to have skin rash, not sure if from ABx, will try Benadryl and monitor for next 24 h rs     Metabolic alkalosis  Improved, has chronic alkalosis for compensation  Would avoid diamox, as intensivist thinks this caused her to go into respiratory failure second time    Chronic diastolic heart failure/PAF:   Continue  amiodarone . IV Lasix   Holding xarelto    Steroid induced hyperglycemia-  A1c level 5.4 on , ISS    HTN   controlled.  Cont' PTA meds. Morbid obesity/Impaired mobility/functional quadriplegia-supportive care  Drug rash- suspected from cefepime now resolved.     Hypokalemia: Resolved     DVT PRx: SCDs for now  [facility]    TOM: 6/10     Subjective:     Chief Complaint / Reason for Physician Visit  On 2-3 liters oxygen   She denies any bloody bm  Developed skin rash and itching this AM, benadryl helping. No SOB. No swelling   Afebrile  No SOB    Review of Systems:  Symptom Y/N Comments  Symptom Y/N Comments   Fever/Chills    Chest Pain     Poor Appetite    Edema     Cough    Abdominal Pain     Sputum    Joint Pain     SOB/CHOPRA    Pruritis/Rash     Nausea/vomit    Tolerating PT/OT     Diarrhea    Tolerating Diet     Constipation    Other       Could NOT obtain due to:      Objective:     VITALS:   Last 24hrs VS reviewed since prior progress note. Most recent are:  Patient Vitals for the past 24 hrs:   Temp Pulse Resp BP SpO2   06/11/22 0751 -- -- -- -- 99 %   06/11/22 0715 98.2 °F (36.8 °C) 94 23 (!) 157/51 97 %   06/11/22 0438 -- -- -- -- 99 %   06/11/22 0256 98.2 °F (36.8 °C) 89 25 (!) 140/58 98 %   06/10/22 2345 -- -- -- -- 98 %   06/10/22 2244 98.4 °F (36.9 °C) 100 29 (!) 123/47 97 %   06/10/22 1954 -- -- -- -- 99 %   06/10/22 1922 98.2 °F (36.8 °C) (!) 102 25 (!) 140/59 98 %   06/10/22 1511 -- (!) 104 (!) 31 (!) 127/51 93 %   06/10/22 1035 98.4 °F (36.9 °C) (!) 106 26 (!) 134/55 96 %       Intake/Output Summary (Last 24 hours) at 6/11/2022 1018  Last data filed at 6/10/2022 2244  Gross per 24 hour   Intake 300 ml   Output 1550 ml   Net -1250 ml        I had a face to face encounter and independently examined this patient on 6/11/2022, as outlined below:  PHYSICAL EXAM:  General: Awake, No acute distress  EENT:  EOMI. Anicteric sclerae. MMM  Resp:  CTA bilaterally, no wheezing or rales. No accessory muscle use  CV:  Regular  rhythm, trace edema  GI:  Soft, Non distended, Non tender.   +Bowel sounds  Neurologic:  Alert and oriented X 3, normal speech,   Psych:   Not anxious nor agitated  Skin:  Diffuse LE and abd wasll pinkish rashes. No jaundice    Reviewed most current lab test results and cultures  YES  Reviewed most current radiology test results   YES  Review and summation of old records today    NO  Reviewed patient's current orders and MAR    YES  PMH/SH reviewed - no change compared to H&P  ________________________________________________________________________  Care Plan discussed with:    Comments   Patient y    Family      RN y    Care Manager     Consultant                        Multidiciplinary team rounds were held today with , nursing, pharmacist and clinical coordinator. Patient's plan of care was discussed; medications were reviewed and discharge planning was addressed. ________________________________________________________________________  Total NON critical care TIME: 30   Minutes    Total CRITICAL CARE TIME Spent:   Minutes non procedure based      Comments   >50% of visit spent in counseling and coordination of care     ________________________________________________________________________  Jenny Moulton MD     Procedures: see electronic medical records for all procedures/Xrays and details which were not copied into this note but were reviewed prior to creation of Plan. LABS:  I reviewed today's most current labs and imaging studies.   Pertinent labs include:  Recent Labs     06/10/22  0340 06/09/22  0635   WBC 9.2 9.8   HGB 7.2* 7.3*   HCT 24.8* 25.6*   PLT 84* 86*     Recent Labs     06/11/22  0413 06/10/22  0340 06/09/22  0635    142 144   K 3.7 3.5 2.9*   CL 96* 99 100   CO2 41* 40* 39*   * 112* 130*   BUN 15 16 19   CREA 0.68 0.67 0.74   CA 8.4* 8.2* 8.5       Signed: Jenny Moulton MD

## 2022-06-11 NOTE — PROGRESS NOTES
Problem: Pressure Injury - Risk of  Goal: *Prevention of pressure injury  Description: Document Gaston Scale and appropriate interventions in the flowsheet. Outcome: Progressing Towards Goal  Note: Pressure Injury Interventions:  Sensory Interventions: Assess changes in LOC,Assess need for specialty bed,Keep linens dry and wrinkle-free,Float heels    Moisture Interventions: Absorbent underpads,Apply protective barrier, creams and emollients,Internal/External urinary devices    Activity Interventions: Assess need for specialty bed,Increase time out of bed    Mobility Interventions: Assess need for specialty bed,Float heels,Pressure redistribution bed/mattress (bed type)    Nutrition Interventions: Document food/fluid/supplement intake    Friction and Shear Interventions: Feet elevated on foot rest,Apply protective barrier, creams and emollients,Lift sheet     Problem: Falls - Risk of  Goal: *Absence of Falls  Description: Document Anna Fall Risk and appropriate interventions in the flowsheet.   Outcome: Progressing Towards Goal  Note: Fall Risk Interventions:  Mobility Interventions: Bed/chair exit alarm,Assess mobility with egress test,Patient to call before getting OOB    Mentation Interventions: Adequate sleep, hydration, pain control,Bed/chair exit alarm,Evaluate medications/consider consulting pharmacy    Medication Interventions: Assess postural VS orthostatic hypotension,Bed/chair exit alarm,Evaluate medications/consider consulting pharmacy    Elimination Interventions: Call light in reach,Bed/chair exit alarm,Patient to call for help with toileting needs    History of Falls Interventions: Bed/chair exit alarm,Evaluate medications/consider consulting pharmacy,Room close to nurse's station

## 2022-06-11 NOTE — PROGRESS NOTES
Hospitalist Progress Note    NAME: Sally Draper   :  1960   MRN:  [id number]       Assessment / Plan:  Acute on chronic hypoxic/hypercapnic respiratory failure-resolved  Left-sided pleural effusion thoracentesis (too small to tap)  JOSLYN/OHS on trilogy  COPD on O2  Was transferred out of ICU and went back d/t acute hypercapnic respiratory failure, improved with bipap, now on 3 liters, using Trilogy at night. Tapering oral steroids, on 20 mg, last dose tomorrow  Continue trilogy device as per pulmonary. CM working for [employer] at [facility]  Seen by pulmonary continue current treatment  On 3 liters of oxygen at baseline  Leukocytosis likely d/t steroids    Acute on chronic anemia:  Hb dropped gradually, received 1 unit on , was improving, some drop noted today to 7.2  Stool occult positive  Protonix 40 q12 hours  S/p EGD on 6/3, no active bleeding, biopsy sent, f/u results. Resume Xarelto   Repeat hb in am, if further worsen, reconsult GI, if better consider resuming Xarelto. If again bleed may have to stop Xarelto altogether     Left hip fracture displaced  age undetermined  Left hip osteomyelitis/septic arthritis   Bilateral buttock and sacral wounds    -[facility] [place] evaluated no surgical intervention planned  -S/p  Aspiration?/Bx by IR, culture negative. -ID recommends daptomycin and cefepime on discharge last admit.  End date . [facility] to [facility]F with Daptomycin, switched to vancomycin. ID recs to check MRSA screen neg -->DC Vanco and cont Zosyn      Metabolic alkalosis  Improved, has chronic alkalosis for compensation  Would avoid diamox, as intensivist thinks this caused her to go into respiratory failure second time    Chronic diastolic heart failure/PAF:   Continue  amiodarone . IV Lasix   Holding xarelto    Steroid induced hyperglycemia-  A1c level 5.4 on , ISS    HTN   controlled.  Cont' PTA meds.     Morbid obesity/Impaired mobility/functional quadriplegia-supportive care  Drug rash- suspected from cefepime now resolved.     Hypokalemia: Resolved     DVT PRx: SCDs for now  [facility]    TOM: 6/10     Subjective:     Chief Complaint / Reason for Physician Visit  On 4 liters oxygen   She denies any bloody bm  4 loose BMs yesterday   Afebrile  No SOB    Review of Systems:  Symptom Y/N Comments  Symptom Y/N Comments   Fever/Chills    Chest Pain     Poor Appetite    Edema     Cough    Abdominal Pain     Sputum    Joint Pain     SOB/CHOPRA    Pruritis/Rash     Nausea/vomit    Tolerating PT/OT     Diarrhea    Tolerating Diet     Constipation    Other       Could NOT obtain due to:      Objective:     VITALS:   Last 24hrs VS reviewed since prior progress note. Most recent are:  Patient Vitals for the past 24 hrs:   Temp Pulse Resp BP SpO2   06/11/22 1016 98.4 °F (36.9 °C) (!) 105 24 (!) 148/51 99 %   06/11/22 0751 -- -- -- -- 99 %   06/11/22 0715 98.2 °F (36.8 °C) 94 23 (!) 157/51 97 %   06/11/22 0438 -- -- -- -- 99 %   06/11/22 0256 98.2 °F (36.8 °C) 89 25 (!) 140/58 98 %   06/10/22 2345 -- -- -- -- 98 %   06/10/22 2244 98.4 °F (36.9 °C) 100 29 (!) 123/47 97 %   06/10/22 1954 -- -- -- -- 99 %   06/10/22 1922 98.2 °F (36.8 °C) (!) 102 25 (!) 140/59 98 %   06/10/22 1511 -- (!) 104 (!) 31 (!) 127/51 93 %   06/10/22 1035 98.4 °F (36.9 °C) (!) 106 26 (!) 134/55 96 %       Intake/Output Summary (Last 24 hours) at 6/11/2022 1019  Last data filed at 6/10/2022 2244  Gross per 24 hour   Intake 300 ml   Output 1550 ml   Net -1250 ml        I had a face to face encounter and independently examined this patient as outlined below:  PHYSICAL EXAM:  General: Awake, No acute distress  EENT:  EOMI. Anicteric sclerae. MMM  Resp:  CTA bilaterally, no wheezing or rales. No accessory muscle use  CV:  Regular  rhythm, trace edema  GI:  Soft, Non distended, Non tender. +Bowel sounds  Neurologic:  Alert and oriented X 3, normal speech,   Psych:   Not anxious nor agitated  Skin:  No rashes.   No jaundice    Reviewed most current lab test results and cultures  YES  Reviewed most current radiology test results   YES  Review and summation of old records today    NO  Reviewed patient's current orders and MAR    YES  PMH/SH reviewed - no change compared to H&P  ________________________________________________________________________  Care Plan discussed with:    Comments   Patient y    Family      RN y    Care Manager     Consultant                        Multidiciplinary team rounds were held today with , nursing, pharmacist and clinical coordinator. Patient's plan of care was discussed; medications were reviewed and discharge planning was addressed. ________________________________________________________________________  Total NON critical care TIME: 30   Minutes    Total CRITICAL CARE TIME Spent:   Minutes non procedure based      Comments   >50% of visit spent in counseling and coordination of care     ________________________________________________________________________  Jose Rafael Zapata MD     Procedures: see electronic medical records for all procedures/Xrays and details which were not copied into this note but were reviewed prior to creation of Plan. LABS:  I reviewed today's most current labs and imaging studies.   Pertinent labs include:  Recent Labs     06/10/22  0340 06/09/22  0635   WBC 9.2 9.8   HGB 7.2* 7.3*   HCT 24.8* 25.6*   PLT 84* 86*     Recent Labs     06/11/22  0413 06/10/22  0340 06/09/22  0635    142 144   K 3.7 3.5 2.9*   CL 96* 99 100   CO2 41* 40* 39*   * 112* 130*   BUN 15 16 19   CREA 0.68 0.67 0.74   CA 8.4* 8.2* 8.5       Signed: Jose Rafael Zapata MD

## 2022-06-11 NOTE — PROGRESS NOTES
06/11/22 1016   Vitals   Temp 98.4 °F (36.9 °C)   Temp Source Oral   Pulse (Heart Rate) (!) 105   Heart Rate Source Monitor   Resp Rate 24   O2 Sat (%) 99 %   Level of Consciousness Alert (0)   BP (!) 148/51   MAP (Monitor) 74   MAP (Calculated) 83   BP 1 Location Right lower arm   BP 1 Method Automatic   BP Patient Position Lying   MEWS Score 3   Patient Observation   Activity In bed;Resting quietly; Watching t.v.   Oxygen Therapy   O2 Device Nasal cannula   O2 Flow Rate (L/min) 2 l/min   MD aware of BP and HR, will continue to monitor.

## 2022-06-12 LAB
ALBUMIN SERPL-MCNC: 2 G/DL (ref 3.5–5)
ALBUMIN/GLOB SERPL: 0.5 {RATIO} (ref 1.1–2.2)
ALP SERPL-CCNC: 211 U/L (ref 45–117)
ALT SERPL-CCNC: 27 U/L (ref 12–78)
ANION GAP SERPL CALC-SCNC: 5 MMOL/L (ref 5–15)
AST SERPL-CCNC: 39 U/L (ref 15–37)
BASOPHILS # BLD: 0 K/UL (ref 0–0.1)
BASOPHILS NFR BLD: 0 % (ref 0–1)
BILIRUB SERPL-MCNC: 1.2 MG/DL (ref 0.2–1)
BUN SERPL-MCNC: 14 MG/DL (ref 6–20)
BUN/CREAT SERPL: 18 (ref 12–20)
CALCIUM SERPL-MCNC: 8.8 MG/DL (ref 8.5–10.1)
CHLORIDE SERPL-SCNC: 94 MMOL/L (ref 97–108)
CO2 SERPL-SCNC: 40 MMOL/L (ref 21–32)
CREAT SERPL-MCNC: 0.76 MG/DL (ref 0.55–1.02)
DIFFERENTIAL METHOD BLD: ABNORMAL
EOSINOPHIL # BLD: 0.5 K/UL (ref 0–0.4)
EOSINOPHIL NFR BLD: 7 % (ref 0–7)
ERYTHROCYTE [DISTWIDTH] IN BLOOD BY AUTOMATED COUNT: 18 % (ref 11.5–14.5)
GLOBULIN SER CALC-MCNC: 4 G/DL (ref 2–4)
GLUCOSE BLD STRIP.AUTO-MCNC: 150 MG/DL (ref 65–117)
GLUCOSE BLD STRIP.AUTO-MCNC: 163 MG/DL (ref 65–117)
GLUCOSE BLD STRIP.AUTO-MCNC: 173 MG/DL (ref 65–117)
GLUCOSE BLD STRIP.AUTO-MCNC: 175 MG/DL (ref 65–117)
GLUCOSE SERPL-MCNC: 123 MG/DL (ref 65–100)
HCT VFR BLD AUTO: 28 % (ref 35–47)
HGB BLD-MCNC: 8.1 G/DL (ref 11.5–16)
IMM GRANULOCYTES # BLD AUTO: 0.1 K/UL (ref 0–0.04)
IMM GRANULOCYTES NFR BLD AUTO: 1 % (ref 0–0.5)
LYMPHOCYTES # BLD: 0.9 K/UL (ref 0.8–3.5)
LYMPHOCYTES NFR BLD: 12 % (ref 12–49)
MCH RBC QN AUTO: 30.5 PG (ref 26–34)
MCHC RBC AUTO-ENTMCNC: 28.9 G/DL (ref 30–36.5)
MCV RBC AUTO: 105.3 FL (ref 80–99)
MONOCYTES # BLD: 0.3 K/UL (ref 0–1)
MONOCYTES NFR BLD: 4 % (ref 5–13)
NEUTS SEG # BLD: 5.9 K/UL (ref 1.8–8)
NEUTS SEG NFR BLD: 76 % (ref 32–75)
NRBC # BLD: 0 K/UL (ref 0–0.01)
NRBC BLD-RTO: 0 PER 100 WBC
PLATELET # BLD AUTO: 100 K/UL (ref 150–400)
PMV BLD AUTO: 12.3 FL (ref 8.9–12.9)
POTASSIUM SERPL-SCNC: 4 MMOL/L (ref 3.5–5.1)
PROT SERPL-MCNC: 6 G/DL (ref 6.4–8.2)
RBC # BLD AUTO: 2.66 M/UL (ref 3.8–5.2)
RBC MORPH BLD: ABNORMAL
SERVICE CMNT-IMP: ABNORMAL
SODIUM SERPL-SCNC: 139 MMOL/L (ref 136–145)
WBC # BLD AUTO: 7.7 K/UL (ref 3.6–11)

## 2022-06-12 PROCEDURE — 85025 COMPLETE CBC W/AUTO DIFF WBC: CPT

## 2022-06-12 PROCEDURE — 74011250637 HC RX REV CODE- 250/637: Performed by: INTERNAL MEDICINE

## 2022-06-12 PROCEDURE — 94640 AIRWAY INHALATION TREATMENT: CPT

## 2022-06-12 PROCEDURE — 74011000250 HC RX REV CODE- 250: Performed by: HOSPITALIST

## 2022-06-12 PROCEDURE — 82962 GLUCOSE BLOOD TEST: CPT

## 2022-06-12 PROCEDURE — 74011250636 HC RX REV CODE- 250/636: Performed by: STUDENT IN AN ORGANIZED HEALTH CARE EDUCATION/TRAINING PROGRAM

## 2022-06-12 PROCEDURE — 74011000250 HC RX REV CODE- 250: Performed by: INTERNAL MEDICINE

## 2022-06-12 PROCEDURE — 74011250637 HC RX REV CODE- 250/637: Performed by: GENERAL ACUTE CARE HOSPITAL

## 2022-06-12 PROCEDURE — 80053 COMPREHEN METABOLIC PANEL: CPT

## 2022-06-12 PROCEDURE — 36415 COLL VENOUS BLD VENIPUNCTURE: CPT

## 2022-06-12 PROCEDURE — 74011636637 HC RX REV CODE- 636/637: Performed by: HOSPITALIST

## 2022-06-12 PROCEDURE — 65270000046 HC RM TELEMETRY

## 2022-06-12 PROCEDURE — 74011250637 HC RX REV CODE- 250/637: Performed by: HOSPITALIST

## 2022-06-12 PROCEDURE — 94660 CPAP INITIATION&MGMT: CPT

## 2022-06-12 PROCEDURE — 74011000258 HC RX REV CODE- 258: Performed by: STUDENT IN AN ORGANIZED HEALTH CARE EDUCATION/TRAINING PROGRAM

## 2022-06-12 RX ADMIN — SUCRALFATE 1 G: 1 TABLET ORAL at 16:50

## 2022-06-12 RX ADMIN — PIPERACILLIN AND TAZOBACTAM 3.38 G: 3; .375 INJECTION, POWDER, LYOPHILIZED, FOR SOLUTION INTRAVENOUS at 05:08

## 2022-06-12 RX ADMIN — GABAPENTIN 100 MG: 100 CAPSULE ORAL at 08:26

## 2022-06-12 RX ADMIN — IPRATROPIUM BROMIDE AND ALBUTEROL SULFATE 3 ML: .5; 3 SOLUTION RESPIRATORY (INHALATION) at 09:21

## 2022-06-12 RX ADMIN — CASTOR OIL AND BALSAM, PERU: 788; 87 OINTMENT TOPICAL at 20:19

## 2022-06-12 RX ADMIN — PANTOPRAZOLE SODIUM 40 MG: 40 TABLET, DELAYED RELEASE ORAL at 08:26

## 2022-06-12 RX ADMIN — MICONAZOLE NITRATE: 20 CREAM TOPICAL at 08:25

## 2022-06-12 RX ADMIN — Medication 2 UNITS: at 08:25

## 2022-06-12 RX ADMIN — GABAPENTIN 100 MG: 100 CAPSULE ORAL at 21:20

## 2022-06-12 RX ADMIN — Medication: at 08:24

## 2022-06-12 RX ADMIN — CASTOR OIL AND BALSAM, PERU: 788; 87 OINTMENT TOPICAL at 08:25

## 2022-06-12 RX ADMIN — Medication 1 CAPSULE: at 08:25

## 2022-06-12 RX ADMIN — SODIUM CHLORIDE, PRESERVATIVE FREE 10 ML: 5 INJECTION INTRAVENOUS at 05:08

## 2022-06-12 RX ADMIN — IPRATROPIUM BROMIDE AND ALBUTEROL SULFATE 3 ML: .5; 3 SOLUTION RESPIRATORY (INHALATION) at 21:39

## 2022-06-12 RX ADMIN — FUROSEMIDE 40 MG: 40 TABLET ORAL at 16:50

## 2022-06-12 RX ADMIN — SUCRALFATE 1 G: 1 TABLET ORAL at 11:37

## 2022-06-12 RX ADMIN — FUROSEMIDE 40 MG: 40 TABLET ORAL at 08:26

## 2022-06-12 RX ADMIN — SODIUM CHLORIDE, PRESERVATIVE FREE 10 ML: 5 INJECTION INTRAVENOUS at 20:19

## 2022-06-12 RX ADMIN — Medication 2 UNITS: at 11:37

## 2022-06-12 RX ADMIN — Medication 2 UNITS: at 16:50

## 2022-06-12 RX ADMIN — RIVAROXABAN 20 MG: 20 TABLET, FILM COATED ORAL at 08:25

## 2022-06-12 RX ADMIN — MICONAZOLE NITRATE: 20 CREAM TOPICAL at 20:18

## 2022-06-12 RX ADMIN — SUCRALFATE 1 G: 1 TABLET ORAL at 08:26

## 2022-06-12 RX ADMIN — ACETAMINOPHEN 650 MG: 325 TABLET ORAL at 16:54

## 2022-06-12 RX ADMIN — AMIODARONE HYDROCHLORIDE 200 MG: 200 TABLET ORAL at 08:25

## 2022-06-12 RX ADMIN — GABAPENTIN 100 MG: 100 CAPSULE ORAL at 16:50

## 2022-06-12 RX ADMIN — DIPHENHYDRAMINE HYDROCHLORIDE, ZINC ACETATE: 2; .1 CREAM TOPICAL at 20:18

## 2022-06-12 RX ADMIN — POTASSIUM CHLORIDE 40 MEQ: 20 TABLET, EXTENDED RELEASE ORAL at 08:25

## 2022-06-12 RX ADMIN — Medication: at 16:50

## 2022-06-12 NOTE — PROGRESS NOTES
Pharmacy Antimicrobial Kinetic Dosing    Indication for Antimicrobials: Bone & Joint Infection    Current Regimen of Each Antimicrobial:  Vancomycin Pharmacy to Dose (- )  Zosyn 3.375 g IV q8h - through     Previous Antimicrobial Therapy:  Daptomycin     Goal Level: AUC: 400-600 mg/hr/Liter/day    Date Dose & Interval Measured (mcg/mL) Predicted AUC/SUDHA   6/3/22 1500 mg Q12H 37.1, 45.8 (random) > 600    held 21.6 N/a    held 17.5 N/a    750 mg x 1 dose N/A N/a    -- 10.8 N/a                 Dosing calculator used: [employer] calculator    Significant Positive Cultures:    blood: ng, final   mrsa, nares: pending    Conditions for Dosing Consideration: None    Labs:  Recent Labs     22  0443 22  0413 06/10/22  0340   CREA 0.76 0.68 0.67   BUN 14 15 16     Recent Labs     22  0443 06/10/22  0340   WBC 7.7 9.2     Temp (24hrs), Av.5 °F (36.9 °C), Min:98.3 °F (36.8 °C), Max:98.9 °F (37.2 °C)      Creatinine Clearance (mL/min):   CrCl (Ideal Body Weight): 63.5      Impression/Plan:   Scr stable    Vancomycin discontinued on  since her nares MRSA is negative as per ID MD note, but on , pharmacy re-consulted to re-start the vancomycin. Will resume with the previous dose of vancomycin 750 mg q 24 hours and will recheck trough after 2-3 maintenance doses  Scr stable  WBC stable  Afebrile  Antimicrobial stop date:  for zosyn; pending for vancomycin      Pharmacy will follow daily and adjust medications as appropriate for renal function and/or serum levels.     Thank you,  Anushka Lentz, PHARMD

## 2022-06-12 NOTE — PROGRESS NOTES
Hospitalist Progress Note    NAME: Jose Almonte   :  1960   MRN:  [id number]       Assessment / Plan:  Acute on chronic hypoxic/hypercapnic respiratory failure-resolved  Left-sided pleural effusion thoracentesis (too small to tap)  JOSLYN/OHS on trilogy  COPD on O2  Was transferred out of ICU and went back d/t acute hypercapnic respiratory failure, improved with bipap, now on 3 liters, using Trilogy at night. Tapered off oral steroids  Continue trilogy device as per pulmonary. CM working for [employer] at [facility]  Seen by pulmonary  On 3 liters of oxygen at baseline  Leukocytosis likely d/t steroids    Acute on chronic anemia:  Hb dropped gradually, received 1 unit on   Stool occult positive  Protonix 40 q12 hours  S/p EGD on 6/3, no active bleeding, biopsy sent, results not that remarkable   Resumed Xarelto      Left hip fracture displaced  age undetermined  Left hip osteomyelitis/septic arthritis   Bilateral buttock and sacral wounds    -[facility] [place] evaluated no surgical intervention planned  -S/p Aspiration?/Bx by IR, culture negative. -ID recommends daptomycin and cefepime on discharge last admit.  End date . Calista Patience go to SNF with Daptomycin, so switched to vancomycin. MRSA screen neg -->DC Vanco and cont Zosyn   Noted on  to have skin rash, not sure if from ABx, getting worse while on Benadryl. No SOB/airway swelling. Eosinophils mildly elevated. Will re consult ID and Pharmacy. Hold zosyn and start Vanco again. Since admission pt started on Steroid that may helped with preventing this rash, last dose of steroid was   Off note on , pt thought to have new rash from cefepime and was DC     Metabolic alkalosis  Improved, has chronic alkalosis for compensation  Would avoid diamox, as intensivist thinks this caused her to go into respiratory failure second time    Chronic diastolic heart failure:  PAF  HTN   Continue amiodarone .    Cont Lasix   Holding xarelto    Steroid induced hyperglycemia-  A1c level 5.4 on 4/21, ISS    Morbid obesity/Impaired mobility/functional quadriplegia-supportive care     Hypokalemia: Resolved     DVT PRx: SCDs for now  [facility]    TOM: 6/14     Subjective:     Chief Complaint / Reason for Physician Visit  On 2-3 liters oxygen   She denies any bloody bm  Diffuse skin rash worse, itching, benadryl not helping much. No SOB. No swelling   Afebrile  No SOB    Review of Systems:  Symptom Y/N Comments  Symptom Y/N Comments   Fever/Chills    Chest Pain     Poor Appetite    Edema     Cough    Abdominal Pain     Sputum    Joint Pain     SOB/CHOPRA    Pruritis/Rash     Nausea/vomit    Tolerating PT/OT     Diarrhea    Tolerating Diet     Constipation    Other       Could NOT obtain due to:      Objective:     VITALS:   Last 24hrs VS reviewed since prior progress note. Most recent are:  Patient Vitals for the past 24 hrs:   Temp Pulse Resp BP SpO2   06/12/22 0709 -- 98 25 (!) 150/49 94 %   06/12/22 0312 98.4 °F (36.9 °C) 90 20 (!) 119/44 96 %   06/11/22 2309 98.4 °F (36.9 °C) 98 20 (!) 130/48 94 %   06/11/22 2227 -- -- -- -- 94 %   06/11/22 2018 -- -- -- -- 96 %   06/11/22 1923 98.6 °F (37 °C) (!) 104 24 (!) 156/56 96 %   06/11/22 1530 98.3 °F (36.8 °C) (!) 104 27 (!) 138/52 95 %   06/11/22 1016 98.4 °F (36.9 °C) (!) 105 24 (!) 148/51 99 %       Intake/Output Summary (Last 24 hours) at 6/12/2022 7066  Last data filed at 6/11/2022 2101  Gross per 24 hour   Intake 200 ml   Output 1800 ml   Net -1600 ml        I had a face to face encounter and independently examined this patient on 6/12/2022, as outlined below:  PHYSICAL EXAM:  General: Awake, No acute distress  EENT:  EOMI. Anicteric sclerae. MMM  Resp:  CTA bilaterally, no wheezing or rales. No accessory muscle use  CV:  Regular  rhythm, trace edema  GI:  Soft, Non distended, Non tender.   +Bowel sounds  Neurologic:  Alert and oriented X 3, normal speech,   Psych:   Not anxious nor agitated  Skin:  Diffuse LE and abd wasll pinkish tigre. No jaundice    Reviewed most current lab test results and cultures  YES  Reviewed most current radiology test results   YES  Review and summation of old records today    NO  Reviewed patient's current orders and MAR    YES  PMH/SH reviewed - no change compared to H&P  ________________________________________________________________________  Care Plan discussed with:    Comments   Patient y    Family      RN y    Care Manager     Consultant                        Multidiciplinary team rounds were held today with , nursing, pharmacist and clinical coordinator. Patient's plan of care was discussed; medications were reviewed and discharge planning was addressed. ________________________________________________________________________  Total NON critical care TIME: 30   Minutes    Total CRITICAL CARE TIME Spent:   Minutes non procedure based      Comments   >50% of visit spent in counseling and coordination of care     ________________________________________________________________________  Gloria Leyva MD     Procedures: see electronic medical records for all procedures/Xrays and details which were not copied into this note but were reviewed prior to creation of Plan. LABS:  I reviewed today's most current labs and imaging studies.   Pertinent labs include:  Recent Labs     06/12/22  0443 06/10/22  0340   WBC 7.7 9.2   HGB 8.1* 7.2*   HCT 28.0* 24.8*   * 84*     Recent Labs     06/12/22  0443 06/11/22  0413 06/10/22  0340    140 142   K 4.0 3.7 3.5   CL 94* 96* 99   CO2 40* 41* 40*   * 118* 112*   BUN 14 15 16   CREA 0.76 0.68 0.67   CA 8.8 8.4* 8.2*   ALB 2.0*  --   --    TBILI 1.2*  --   --    ALT 27  --   --        Signed: Gloria Leyva MD

## 2022-06-12 NOTE — PROGRESS NOTES
Problem: Pressure Injury - Risk of  Goal: *Prevention of pressure injury  Description: Document Gaston Scale and appropriate interventions in the flowsheet. Outcome: Not Progressing Towards Goal  Note: Pressure Injury Interventions:  Sensory Interventions: Assess changes in LOC,Assess need for specialty bed,Float heels,Pressure redistribution bed/mattress (bed type)    Moisture Interventions: Absorbent underpads,Apply protective barrier, creams and emollients,Internal/External urinary devices    Activity Interventions: Assess need for specialty bed,Pressure redistribution bed/mattress(bed type)    Mobility Interventions: Assess need for specialty bed,Float heels,Pressure redistribution bed/mattress (bed type)    Nutrition Interventions: Document food/fluid/supplement intake    Friction and Shear Interventions: Apply protective barrier, creams and emollients       Problem: Falls - Risk of  Goal: *Absence of Falls  Description: Document Anna Fall Risk and appropriate interventions in the flowsheet.   Outcome: Not Progressing Towards Goal  Note: Fall Risk Interventions:  Mobility Interventions: Bed/chair exit alarm,Assess mobility with egress test,Patient to call before getting OOB    Mentation Interventions: Adequate sleep, hydration, pain control,Bed/chair exit alarm,Evaluate medications/consider consulting pharmacy    Medication Interventions: Assess postural VS orthostatic hypotension,Bed/chair exit alarm,Evaluate medications/consider consulting pharmacy,Patient to call before getting OOB    Elimination Interventions: Call light in reach,Bed/chair exit alarm,Patient to call for help with toileting needs    History of Falls Interventions: Bed/chair exit alarm,Evaluate medications/consider consulting pharmacy

## 2022-06-13 LAB
ALBUMIN SERPL-MCNC: 1.9 G/DL (ref 3.5–5)
ALBUMIN/GLOB SERPL: 0.6 {RATIO} (ref 1.1–2.2)
ALP SERPL-CCNC: 209 U/L (ref 45–117)
ALT SERPL-CCNC: 26 U/L (ref 12–78)
ANION GAP SERPL CALC-SCNC: 4 MMOL/L (ref 5–15)
AST SERPL-CCNC: 40 U/L (ref 15–37)
BASOPHILS # BLD: 0 K/UL (ref 0–0.1)
BASOPHILS NFR BLD: 0 % (ref 0–1)
BILIRUB SERPL-MCNC: 1.1 MG/DL (ref 0.2–1)
BUN SERPL-MCNC: 13 MG/DL (ref 6–20)
BUN/CREAT SERPL: 19 (ref 12–20)
CALCIUM SERPL-MCNC: 8.1 MG/DL (ref 8.5–10.1)
CHLORIDE SERPL-SCNC: 93 MMOL/L (ref 97–108)
CO2 SERPL-SCNC: 42 MMOL/L (ref 21–32)
CREAT SERPL-MCNC: 0.68 MG/DL (ref 0.55–1.02)
DIFFERENTIAL METHOD BLD: ABNORMAL
EOSINOPHIL # BLD: 0.5 K/UL (ref 0–0.4)
EOSINOPHIL NFR BLD: 7 % (ref 0–7)
ERYTHROCYTE [DISTWIDTH] IN BLOOD BY AUTOMATED COUNT: 18.1 % (ref 11.5–14.5)
GLOBULIN SER CALC-MCNC: 3.4 G/DL (ref 2–4)
GLUCOSE BLD STRIP.AUTO-MCNC: 108 MG/DL (ref 65–117)
GLUCOSE BLD STRIP.AUTO-MCNC: 156 MG/DL (ref 65–117)
GLUCOSE BLD STRIP.AUTO-MCNC: 165 MG/DL (ref 65–117)
GLUCOSE BLD STRIP.AUTO-MCNC: 177 MG/DL (ref 65–117)
GLUCOSE SERPL-MCNC: 108 MG/DL (ref 65–100)
HCT VFR BLD AUTO: 24.5 % (ref 35–47)
HGB BLD-MCNC: 7 G/DL (ref 11.5–16)
IMM GRANULOCYTES # BLD AUTO: 0.1 K/UL (ref 0–0.04)
IMM GRANULOCYTES NFR BLD AUTO: 1 % (ref 0–0.5)
LYMPHOCYTES # BLD: 1.1 K/UL (ref 0.8–3.5)
LYMPHOCYTES NFR BLD: 15 % (ref 12–49)
MCH RBC QN AUTO: 29.7 PG (ref 26–34)
MCHC RBC AUTO-ENTMCNC: 28.6 G/DL (ref 30–36.5)
MCV RBC AUTO: 103.8 FL (ref 80–99)
MONOCYTES # BLD: 0.4 K/UL (ref 0–1)
MONOCYTES NFR BLD: 5 % (ref 5–13)
NEUTS SEG # BLD: 5.2 K/UL (ref 1.8–8)
NEUTS SEG NFR BLD: 72 % (ref 32–75)
NRBC # BLD: 0.02 K/UL (ref 0–0.01)
NRBC BLD-RTO: 0.3 PER 100 WBC
PLATELET # BLD AUTO: 108 K/UL (ref 150–400)
PMV BLD AUTO: 12.7 FL (ref 8.9–12.9)
POTASSIUM SERPL-SCNC: 3.7 MMOL/L (ref 3.5–5.1)
PROT SERPL-MCNC: 5.3 G/DL (ref 6.4–8.2)
RBC # BLD AUTO: 2.36 M/UL (ref 3.8–5.2)
SERVICE CMNT-IMP: ABNORMAL
SERVICE CMNT-IMP: NORMAL
SODIUM SERPL-SCNC: 139 MMOL/L (ref 136–145)
WBC # BLD AUTO: 7.2 K/UL (ref 3.6–11)

## 2022-06-13 PROCEDURE — 82962 GLUCOSE BLOOD TEST: CPT

## 2022-06-13 PROCEDURE — 94660 CPAP INITIATION&MGMT: CPT

## 2022-06-13 PROCEDURE — 85025 COMPLETE CBC W/AUTO DIFF WBC: CPT

## 2022-06-13 PROCEDURE — 74011250637 HC RX REV CODE- 250/637: Performed by: INTERNAL MEDICINE

## 2022-06-13 PROCEDURE — 36415 COLL VENOUS BLD VENIPUNCTURE: CPT

## 2022-06-13 PROCEDURE — 74011000250 HC RX REV CODE- 250: Performed by: INTERNAL MEDICINE

## 2022-06-13 PROCEDURE — 74011250637 HC RX REV CODE- 250/637: Performed by: HOSPITALIST

## 2022-06-13 PROCEDURE — 74011250636 HC RX REV CODE- 250/636: Performed by: GENERAL ACUTE CARE HOSPITAL

## 2022-06-13 PROCEDURE — 99233 SBSQ HOSP IP/OBS HIGH 50: CPT | Performed by: INTERNAL MEDICINE

## 2022-06-13 PROCEDURE — 80053 COMPREHEN METABOLIC PANEL: CPT

## 2022-06-13 PROCEDURE — 74011250637 HC RX REV CODE- 250/637: Performed by: GENERAL ACUTE CARE HOSPITAL

## 2022-06-13 PROCEDURE — 94640 AIRWAY INHALATION TREATMENT: CPT

## 2022-06-13 PROCEDURE — 65270000046 HC RM TELEMETRY

## 2022-06-13 PROCEDURE — 74011636637 HC RX REV CODE- 636/637: Performed by: HOSPITALIST

## 2022-06-13 PROCEDURE — 77010033678 HC OXYGEN DAILY

## 2022-06-13 RX ADMIN — VANCOMYCIN HYDROCHLORIDE 750 MG: 750 INJECTION, POWDER, LYOPHILIZED, FOR SOLUTION INTRAVENOUS at 16:27

## 2022-06-13 RX ADMIN — FUROSEMIDE 40 MG: 40 TABLET ORAL at 09:03

## 2022-06-13 RX ADMIN — FUROSEMIDE 40 MG: 40 TABLET ORAL at 16:27

## 2022-06-13 RX ADMIN — RIVAROXABAN 20 MG: 20 TABLET, FILM COATED ORAL at 09:04

## 2022-06-13 RX ADMIN — MICONAZOLE NITRATE: 20 CREAM TOPICAL at 21:29

## 2022-06-13 RX ADMIN — Medication 2 UNITS: at 11:38

## 2022-06-13 RX ADMIN — IPRATROPIUM BROMIDE AND ALBUTEROL SULFATE 3 ML: .5; 3 SOLUTION RESPIRATORY (INHALATION) at 19:50

## 2022-06-13 RX ADMIN — SUCRALFATE 1 G: 1 TABLET ORAL at 16:27

## 2022-06-13 RX ADMIN — SUCRALFATE 1 G: 1 TABLET ORAL at 11:38

## 2022-06-13 RX ADMIN — IPRATROPIUM BROMIDE AND ALBUTEROL SULFATE 3 ML: .5; 3 SOLUTION RESPIRATORY (INHALATION) at 08:21

## 2022-06-13 RX ADMIN — GABAPENTIN 100 MG: 100 CAPSULE ORAL at 09:03

## 2022-06-13 RX ADMIN — CASTOR OIL AND BALSAM, PERU: 788; 87 OINTMENT TOPICAL at 09:00

## 2022-06-13 RX ADMIN — SODIUM CHLORIDE, PRESERVATIVE FREE 30 ML: 5 INJECTION INTRAVENOUS at 03:10

## 2022-06-13 RX ADMIN — SODIUM CHLORIDE, PRESERVATIVE FREE 10 ML: 5 INJECTION INTRAVENOUS at 21:30

## 2022-06-13 RX ADMIN — CASTOR OIL AND BALSAM, PERU: 788; 87 OINTMENT TOPICAL at 21:29

## 2022-06-13 RX ADMIN — METOPROLOL TARTRATE 25 MG: 25 TABLET, FILM COATED ORAL at 18:40

## 2022-06-13 RX ADMIN — MICONAZOLE NITRATE: 20 CREAM TOPICAL at 11:35

## 2022-06-13 RX ADMIN — AMIODARONE HYDROCHLORIDE 200 MG: 200 TABLET ORAL at 09:03

## 2022-06-13 RX ADMIN — PANTOPRAZOLE SODIUM 40 MG: 40 TABLET, DELAYED RELEASE ORAL at 09:04

## 2022-06-13 RX ADMIN — Medication: at 11:34

## 2022-06-13 RX ADMIN — Medication: at 18:39

## 2022-06-13 RX ADMIN — SUCRALFATE 1 G: 1 TABLET ORAL at 09:03

## 2022-06-13 RX ADMIN — POTASSIUM CHLORIDE 40 MEQ: 20 TABLET, EXTENDED RELEASE ORAL at 09:03

## 2022-06-13 RX ADMIN — ACETAMINOPHEN 650 MG: 325 TABLET ORAL at 09:18

## 2022-06-13 RX ADMIN — SODIUM CHLORIDE, PRESERVATIVE FREE 10 ML: 5 INJECTION INTRAVENOUS at 13:33

## 2022-06-13 RX ADMIN — Medication 1 CAPSULE: at 09:03

## 2022-06-13 RX ADMIN — Medication 2 UNITS: at 16:27

## 2022-06-13 NOTE — PROGRESS NOTES
Bedside and Verbal shift change report given to Yoandy Cyr (oncoming nurse) by Dimitry Merrill (offgoing nurse). Report included the following information SBAR, Kardex, Procedure Summary, Intake/Output, MAR and Recent Results. 2000: Patient bed linens changed, pericare performed. Anti-cream applied to rash on arm and abdomen. Patient with incontinent, soft and formed bowel movement. Wound care performed to sacrum and buttocks, some areas noted to be bleeding. Repositioned patient in bed on right side. 2315: Patient placed on CPAP machine by respiratory therapist, resting comfortably    [phone]: CO2 on lab draw at 42, patient is chronically high.  Nurse practitioner made aware

## 2022-06-13 NOTE — PROGRESS NOTES
1900- Bedside shift change report given to Soila Chawla RN (oncoming nurse) by Ely Morel RN (offgoing nurse). Report included the following information SBAR, Kardex, ED Summary, Intake/Output, MAR, Recent Results, Med Rec Status and Cardiac Rhythm NSR/ST.     0700- End of Shift Note    Bedside shift change report given to Elver Ivan (oncoming nurse) by Ariana Wells RN (offgoing nurse). Report included the following information SBAR, Kardex, ED Summary, Intake/Output, MAR, Recent Results, Med Rec Status and Cardiac Rhythm NSR    Shift worked:  7p-7a     Shift summary and any significant changes:     No change overnight. Concerns for physician to address:      Zone phone for oncoming shift:          Activity:  Activity Level: Bed Rest  Number times ambulated in hallways past shift: 0  Number of times OOB to chair past shift: 0    Cardiac:   Cardiac Monitoring: Yes      Cardiac Rhythm: Sinus Rhythm    Access:   Current line(s): PICC     Genitourinary:   Urinary status: voiding and external catheter    Respiratory:   O2 Device: BIPAP  Chronic home O2 use?: YES  Incentive spirometer at bedside: YES       GI:  Last Bowel Movement Date: 06/10/22  Current diet:  ADULT DIET Dysphagia - Soft & Bite Sized  ADULT ORAL NUTRITION SUPPLEMENT Breakfast, Dinner; Renal Supplement  Passing flatus: YES  Tolerating current diet: YES       Pain Management:   Patient states pain is manageable on current regimen: YES    Skin:  Gaston Score: 14  Interventions: turn team, increase time out of bed, PT/OT consult and internal/external urinary devices    Patient Safety:  Fall Score:  Total Score: 2  Interventions: bed/chair alarm, assistive device (walker, cane, etc), gripper socks, pt to call before getting OOB and stay with me (per policy)  High Fall Risk: Yes    Length of Stay:  Expected LOS: 3d 14h  Actual LOS: 4295  Zachary Bacon RN

## 2022-06-13 NOTE — PROGRESS NOTES
Infectious Disease progress      Impression  Chronic displaced femoral neck fracture of the left hip  Possible Osteomyelitis of the left hip  Nonambulatory for greater than 2 years  MRI - pelvis- 5/3- L femoral neck displaced fracture with fluid in the  fracture gap and edema/enhancement & surrounding marrow , soft tissues. Findings may reflect a healing fracture with possible superimposed  osteomyelitis/osteonecrosis. Right sacral and bilateral parasymphyseal pubic insufficiency fractures. RIGHT femoral head and LEFT greater trochanter nondisplaced fractures. S/p evaluation by Ortho  No plans for Surgery per Ortho  Recommendations for antibiotics. Patient continuing with antibiotic therapy  S/p Fluid aspiration by IR on 5/5  Cultures-NG. No organisms on GS  ( pt has been on Levaquin since 5/4 , may affect final culture results). S/p DC of Zosyn due to concern for drug-induced rash. Macular & erythematous rash on face, neck  UE. ? Drug rash vs fungal rash. Sudden onset and rapid spread favors drug rash. Okay to DC antibiotics    Acute on chronic hypoxic, hypercapnic respiratory failure  Back on BiPAP      Morbid obesity   BMI 54    ESR 64( was 58, 60)   CRP 6.2( was 7.4, 8.65)    Plan  -DC Zosyn. Patient has been on close to 6 weeks of antibiotic therapy planned end date was 6/16. Patient was being treated empirically for possible septic arthritis based on MRI report that also indicated nonunion of  Displaced femoral fracture. Cultures were negative, Patient did not have fever, chills. --Continue probiotic  -Continue to monitor rash. -D/w Dr Fransisco Nava. Possible adverse effects of long term antibiotics are inclusive of but not limited to following  BM suppression, neutropenia , cytopenias , aplastic anemia hemorrhage liver & renal dysfunction/ liver , renal failure  , GI dysfunction- N, V  Diarrhea,C.difficile disease, rash , allergy , anaphylaxis. toxic epidermal necrolysis  Neuro toxicity , seizure disorder  Side effects tend to be more pronounced in the elderly. D/w pt. Pt seen today. Awake  L/hip pain less. Diarrhea yesterday, none today. Rash noted, macular on erythematous base    Enrique Hernandez  is a 61/F with medical history significant for acute on chronic respiratory failure , atrial fibrillation chronic fracture left hip , currently being treated as osteomyelitis/septic arthritis of left hip based on MRI findings. Patient was  admitted on 05/23 for acute on chronic hypercapnic respiratory failure. Initially admitted to ICU and then transferred to step down on 05/24 after treating with BiPAP. Thereafter transferred back to ICU. Patient seen today on stepdown. ID service has been reconsulted for antibiotic recommendations. Plan from previous hospitalzation was for vancomycin/cefepime IV x6 weeks end date 6/16, to be switched to daptomycin on discharge. Patient had developed adverse effects to cefepime. Daptomycin was not covered by SNF.   Patient is currently on Vancomycin and Zosyn IV     Past Medical History:   Diagnosis Date    Acute on chronic respiratory failure with hypoxia and hypercapnia (HCC) 11/20/2020    Atrial fibrillation ([address] 11/4/2020    [facility]     Diabetes [facility]     Hypertension     Ill-defined condition     high cholesterol    Ill-defined condition     tachycardia    JOSLYN (obstructive sleep apnea) 11/20/2020    Other and unspecified symptoms and signs involving general sensations and perceptions     ruptured disc    S/P cardiac cath 11/4/2020    11/3/2020 nonobstructive disease     Past Surgical History:   Procedure Laterality Date    ME COMPRE ELECTROPHYSIOL XM W/LEFT ATRIAL PACNG/REC N/A 11/23/2020    Lt Atrial Pace & Record During Ep Study performed by Gamaliel Morales MD at [facility] CARDIAC CATH [facility]    ME COMPRE EP EVAL ABLTJ 3D MAPG TX SVT N/A 11/23/2020    ABLATION A-FLUTTER performed by Gamaliel Morales MD at [facility] CARDIAC CATH LAB    [place] ICAR CATHETER ABLATION ARRHYTHMIA ADD ON N/A 11/23/2020    Ablation Svt/Vt Add On performed by Lidia Pozo MD at [facility] CARDIAC CATH LAB    [place] INTRACARDIAC ELECTROPHYSIOLOGIC 3D MAPPING N/A 11/23/2020    Ep 3d Mapping performed by Lidia Pozo MD at [facility] CARDIAC CATH LAB    [place] STIM/PACING HEART POST IV DRUG INFU N/A 11/23/2020    Drug Stimulation performed by Lidia Pozo MD at [facility] CARDIAC CATH LAB     Allergies   Allergen Reactions    Cefepime Rash    Cefazolin Rash    Other Plant, Animal, Environmental Rash     Ivory soap caused rash on hands. Social History     Socioeconomic History    Marital status: SINGLE     Spouse name: Not on file    Number of children: Not on file    Years of education: Not on file    Highest education level: Not on file   Occupational History    Not on file   Tobacco Use    Smoking status: Former Smoker    Smokeless tobacco: Never Used   Substance and Sexual Activity    Alcohol use: No    Drug use: No    Sexual activity: Not on file   Other Topics Concern    Not on file   Social History Narrative    Not on file     Social Determinants of Health     Financial Resource Strain:     Difficulty of Paying Living Expenses: Not on file   Food Insecurity:     Worried About [facility] in the Last Year: Not on file    Quita of Food in the Last Year: Not on file   Transportation Needs:     Lack of Transportation (Medical): Not on file    Lack of Transportation (Non-Medical):  Not on file   Physical Activity:     Days of Exercise per Week: Not on file    Minutes of Exercise per Session: Not on file   Stress:     Feeling of Stress : Not on file   Social Connections:     Frequency of Communication with Friends and Family: Not on file    Frequency of Social Gatherings with Friends and Family: Not on file    Attends Anabaptist Services: Not on file    Active Member of Clubs or Organizations: Not on file    Attends Club or Organization Meetings: Not on file    Marital Status: Not on file   Intimate Partner Violence:     Fear of Current or Ex-Partner: Not on file    Emotionally Abused: Not on file    Physically Abused: Not on file    Sexually Abused: Not on file   Housing Stability:     Unable to Pay for Housing in the Last Year: Not on file    Number of [place] in the Last Year: Not on file    Unstable Housing in the Last Year: Not on file     No family status information on file. Medication Documentation Review Audit    **Prior to Admission medications have not yet been reviewed for this encounter**         Review of Systems - Negative except those mentioned in H&P  PHYSICAL EXAM:  General:          Awake, cooperative, no acute distress    EENT:              EOMI. Anicteric sclerae. MMM  Resp:               CTA bilaterally, no wheezing or rales. No accessory muscle use  CV:                  Regular  rhythm,  No edema  GI:                   Soft, Non distended, Non tender. +Bowel sounds  Neurologic:      Alert and oriented X 3, normal speech,   Psych:             Good insight. Not anxious nor agitated  Skin:                No rashes. No jaundice.       Ruben Phan MD [address]

## 2022-06-13 NOTE — PROGRESS NOTES
Hospitalist Progress Note    NAME: Shanti Childs   :  1960   MRN:  [id number]       Assessment / Plan:  Acute on chronic hypoxic/hypercapnic respiratory failure-resolved  Left-sided pleural effusion thoracentesis (too small to tap)  JOSLYN/OHS on trilogy  COPD on O2  Was transferred out of ICU and went back d/t acute hypercapnic respiratory failure, improved with bipap, now on 3 liters, using Trilogy at night. Tapered off oral steroids  Continue trilogy device as per pulmonary. CM working for [employer] at [facility]  Seen by pulmonary  On 3 liters of oxygen at baseline  Leukocytosis likely d/t steroids    Acute on chronic anemia:  Hb dropped gradually, received 1 unit on   Stool occult positive  Protonix 40 q12 hours  S/p EGD on 6/3, no active bleeding, biopsy sent, results not that remarkable   Resumed Xarelto      Left hip fracture displaced  age undetermined  Left hip osteomyelitis/septic arthritis   Bilateral buttock and sacral wounds    -[facility] [place] evaluated no surgical intervention planned  -S/p Aspiration?/Bx by IR, culture negative. -ID recommends daptomycin and cefepime on discharge last admit.  End date . Kristina Kaye go to SNF with Daptomycin, so switched to vancomycin. MRSA screen neg -->DC Vanco and cont Zosyn   Hold zosyn and start Vanco again. Off note on , pt thought to have new rash from cefepime and was DC    Skin rash, getting worse  Noted on  to have skin rash, not sure if from ABx, getting worse while on Benadryl. No SOB/airway swelling. Eosinophils mildly elevated. Will re consult ID and Pharmacy. Hold zosyn and start Vanco again.  Since admission pt started on Steroid that may helped with preventing this rash, last dose of steroid was   Off note on , pt thought to have new rash from cefepime and was DC, and rash resolved, pt does not know if she got steroids at the rehab  Last admission was started on Amio, Gabapentin, probiotics, folic acid, and Xarelto  Will hold the above meds except zarelto and re assess in AM  D/w Pharmacy  ID consult pending  D/w ID will hold off Vanco and all other ABx     Metabolic alkalosis  Improved, has chronic alkalosis for compensation  Would avoid diamox, as intensivist thinks this caused her to go into respiratory failure second time    Chronic diastolic heart failure:  PAF  HTN   Continue amiodarone . Cont Lasix   Holding xarelto    Steroid induced hyperglycemia-  A1c level 5.4 on 4/21, ISS    Morbid obesity/Impaired mobility/functional quadriplegia-supportive care     Hypokalemia: Resolved     DVT PRx: SCDs for now  [facility]    TOM: 6/14     Subjective:     Chief Complaint / Reason for Physician Visit  On 2-3 liters oxygen   She denies any bloody bm  Diffuse skin rash worse, itching, benadryl not helping much. More on shoulders, new R arm rash and swelling   No SOB. No swelling   Afebrile  No SOB    Review of Systems:  Symptom Y/N Comments  Symptom Y/N Comments   Fever/Chills    Chest Pain     Poor Appetite    Edema     Cough    Abdominal Pain     Sputum    Joint Pain     SOB/CHOPRA    Pruritis/Rash     Nausea/vomit    Tolerating PT/OT     Diarrhea    Tolerating Diet     Constipation    Other       Could NOT obtain due to:      Objective:     VITALS:   Last 24hrs VS reviewed since prior progress note.  Most recent are:  Patient Vitals for the past 24 hrs:   Temp Pulse Resp BP SpO2   06/13/22 1047 99 °F (37.2 °C) (!) 103 27 (!) 121/44 97 %   06/13/22 0900 -- (!) 104 26 -- 91 %   06/13/22 0830 -- (!) 106 (!) 34 -- 100 %   06/13/22 0800 -- 89 15 -- 100 %   06/13/22 0730 -- 89 21 -- 98 %   06/13/22 0700 -- 90 20 -- 96 %   06/13/22 0326 -- -- -- -- 99 %   06/13/22 0310 98.2 °F (36.8 °C) 94 18 (!) 152/55 100 %   06/12/22 2332 -- -- -- -- 93 %   06/12/22 2250 98.5 °F (36.9 °C) (!) 104 29 (!) 155/49 93 %   06/12/22 2139 -- -- -- -- 94 %   06/12/22 1940 98.6 °F (37 °C) 99 24 (!) 158/60 97 %   06/12/22 1654 98.2 °F (36.8 °C) 100 29 (!) 139/53 94 % Intake/Output Summary (Last 24 hours) at 6/13/2022 1213  Last data filed at 6/13/2022 0334  Gross per 24 hour   Intake 420 ml   Output 1200 ml   Net -780 ml        I had a face to face encounter and independently examined this patient on 6/13/2022, as outlined below:  PHYSICAL EXAM:  General: Awake, No acute distress  EENT:  EOMI. Anicteric sclerae. MMM  Resp:  CTA bilaterally, no wheezing or rales. No accessory muscle use  CV:  Regular  rhythm, trace edema  GI:  Soft, Non distended, Non tender. +Bowel sounds  Neurologic:  Alert and oriented X 3, normal speech,   Psych:   Not anxious nor agitated  Skin:  Diffuse LE and abd wasll pinkish rashes. Now on shoulders, RUE edema No jaundice    Reviewed most current lab test results and cultures  YES  Reviewed most current radiology test results   YES  Review and summation of old records today    NO  Reviewed patient's current orders and MAR    YES  PMH/SH reviewed - no change compared to H&P  ________________________________________________________________________  Care Plan discussed with:    Comments   Patient y    Family      RN y    Care Manager y    Consultant  y Pharm                     Multidiciplinary team rounds were held today with , nursing, pharmacist and clinical coordinator. Patient's plan of care was discussed; medications were reviewed and discharge planning was addressed. ________________________________________________________________________  Total NON critical care TIME: 36   Minutes    Total CRITICAL CARE TIME Spent:   Minutes non procedure based      Comments   >50% of visit spent in counseling and coordination of care     ________________________________________________________________________  Yong Sellers MD     Procedures: see electronic medical records for all procedures/Xrays and details which were not copied into this note but were reviewed prior to creation of Plan.       LABS:  I reviewed today's most current labs and imaging studies.   Pertinent labs include:  Recent Labs     06/13/22 0308 06/12/22 0443   WBC 7.2 7.7   HGB 7.0* 8.1*   HCT 24.5* 28.0*   * 100*     Recent Labs     06/13/22 0308 06/12/22 0443 06/11/22  0413    139 140   K 3.7 4.0 3.7   CL 93* 94* 96*   CO2 42* 40* 41*   * 123* 118*   BUN 13 14 15   CREA 0.68 0.76 0.68   CA 8.1* 8.8 8.4*   ALB 1.9* 2.0*  --    TBILI 1.1* 1.2*  --    ALT 26 27  --        Signed: Nan Caputo MD

## 2022-06-13 NOTE — PROGRESS NOTES
Problem: Pressure Injury - Risk of  Goal: *Prevention of pressure injury  Description: Document Gaston Scale and appropriate interventions in the flowsheet.   Outcome: Progressing Towards Goal  Note: Pressure Injury Interventions:  Sensory Interventions: Assess changes in LOC,Assess need for specialty bed,Discuss PT/OT consult with provider    Moisture Interventions: Absorbent underpads,Apply protective barrier, creams and emollients,Assess need for specialty bed,Check for incontinence Q2 hours and as needed,Internal/External urinary devices    Activity Interventions: Assess need for specialty bed,Chair cushion,Increase time out of bed,PT/OT evaluation    Mobility Interventions: Assess need for specialty bed,Float heels,HOB 30 degrees or less,PT/OT evaluation    Nutrition Interventions: Document food/fluid/supplement intake,Discuss nutritional consult with provider,Offer support with meals,snacks and hydration    Friction and Shear Interventions: Apply protective barrier, creams and emollients,Feet elevated on foot rest,Foam dressings/transparent film/skin sealants,HOB 30 degrees or less,Lift sheet,Minimize layers

## 2022-06-14 ENCOUNTER — APPOINTMENT (OUTPATIENT)
Dept: VASCULAR SURGERY | Age: 62
DRG: 189 | End: 2022-06-14
Attending: GENERAL ACUTE CARE HOSPITAL
Payer: MEDICARE

## 2022-06-14 LAB
ANION GAP SERPL CALC-SCNC: 5 MMOL/L (ref 5–15)
BASOPHILS # BLD: 0 K/UL (ref 0–0.1)
BASOPHILS NFR BLD: 0 % (ref 0–1)
BUN SERPL-MCNC: 12 MG/DL (ref 6–20)
BUN/CREAT SERPL: 17 (ref 12–20)
CALCIUM SERPL-MCNC: 8.3 MG/DL (ref 8.5–10.1)
CHLORIDE SERPL-SCNC: 93 MMOL/L (ref 97–108)
CO2 SERPL-SCNC: 42 MMOL/L (ref 21–32)
CREAT SERPL-MCNC: 0.7 MG/DL (ref 0.55–1.02)
DIFFERENTIAL METHOD BLD: ABNORMAL
EOSINOPHIL # BLD: 0.5 K/UL (ref 0–0.4)
EOSINOPHIL NFR BLD: 6 % (ref 0–7)
ERYTHROCYTE [DISTWIDTH] IN BLOOD BY AUTOMATED COUNT: 18.2 % (ref 11.5–14.5)
GLUCOSE BLD STRIP.AUTO-MCNC: 123 MG/DL (ref 65–117)
GLUCOSE BLD STRIP.AUTO-MCNC: 149 MG/DL (ref 65–117)
GLUCOSE BLD STRIP.AUTO-MCNC: 220 MG/DL (ref 65–117)
GLUCOSE BLD STRIP.AUTO-MCNC: 299 MG/DL (ref 65–117)
GLUCOSE SERPL-MCNC: 122 MG/DL (ref 65–100)
HCT VFR BLD AUTO: 25.6 % (ref 35–47)
HGB BLD-MCNC: 7.3 G/DL (ref 11.5–16)
IMM GRANULOCYTES # BLD AUTO: 0.1 K/UL (ref 0–0.04)
IMM GRANULOCYTES NFR BLD AUTO: 1 % (ref 0–0.5)
LYMPHOCYTES # BLD: 1.1 K/UL (ref 0.8–3.5)
LYMPHOCYTES NFR BLD: 15 % (ref 12–49)
MCH RBC QN AUTO: 30 PG (ref 26–34)
MCHC RBC AUTO-ENTMCNC: 28.5 G/DL (ref 30–36.5)
MCV RBC AUTO: 105.3 FL (ref 80–99)
MONOCYTES # BLD: 0.4 K/UL (ref 0–1)
MONOCYTES NFR BLD: 5 % (ref 5–13)
NEUTS SEG # BLD: 5.4 K/UL (ref 1.8–8)
NEUTS SEG NFR BLD: 73 % (ref 32–75)
NRBC # BLD: 0.04 K/UL (ref 0–0.01)
NRBC BLD-RTO: 0.5 PER 100 WBC
PLATELET # BLD AUTO: 130 K/UL (ref 150–400)
PMV BLD AUTO: 12.3 FL (ref 8.9–12.9)
POTASSIUM SERPL-SCNC: 3.6 MMOL/L (ref 3.5–5.1)
RBC # BLD AUTO: 2.43 M/UL (ref 3.8–5.2)
RBC MORPH BLD: ABNORMAL
SERVICE CMNT-IMP: ABNORMAL
SODIUM SERPL-SCNC: 140 MMOL/L (ref 136–145)
WBC # BLD AUTO: 7.5 K/UL (ref 3.6–11)

## 2022-06-14 PROCEDURE — 74011636637 HC RX REV CODE- 636/637: Performed by: HOSPITALIST

## 2022-06-14 PROCEDURE — 94640 AIRWAY INHALATION TREATMENT: CPT

## 2022-06-14 PROCEDURE — 77010033678 HC OXYGEN DAILY

## 2022-06-14 PROCEDURE — 74011250637 HC RX REV CODE- 250/637: Performed by: INTERNAL MEDICINE

## 2022-06-14 PROCEDURE — 94660 CPAP INITIATION&MGMT: CPT

## 2022-06-14 PROCEDURE — 36415 COLL VENOUS BLD VENIPUNCTURE: CPT

## 2022-06-14 PROCEDURE — 85025 COMPLETE CBC W/AUTO DIFF WBC: CPT

## 2022-06-14 PROCEDURE — 74011000250 HC RX REV CODE- 250: Performed by: INTERNAL MEDICINE

## 2022-06-14 PROCEDURE — 65270000046 HC RM TELEMETRY

## 2022-06-14 PROCEDURE — 82962 GLUCOSE BLOOD TEST: CPT

## 2022-06-14 PROCEDURE — 80048 BASIC METABOLIC PNL TOTAL CA: CPT

## 2022-06-14 PROCEDURE — 74011250637 HC RX REV CODE- 250/637: Performed by: GENERAL ACUTE CARE HOSPITAL

## 2022-06-14 PROCEDURE — 74011250637 HC RX REV CODE- 250/637: Performed by: HOSPITALIST

## 2022-06-14 PROCEDURE — 93971 EXTREMITY STUDY: CPT

## 2022-06-14 PROCEDURE — 99233 SBSQ HOSP IP/OBS HIGH 50: CPT | Performed by: INTERNAL MEDICINE

## 2022-06-14 PROCEDURE — 74011636637 HC RX REV CODE- 636/637: Performed by: GENERAL ACUTE CARE HOSPITAL

## 2022-06-14 RX ORDER — PREDNISONE 20 MG/1
40 TABLET ORAL
Status: DISCONTINUED | OUTPATIENT
Start: 2022-06-14 | End: 2022-06-17 | Stop reason: HOSPADM

## 2022-06-14 RX ORDER — DIPHENHYDRAMINE HCL 25 MG
25 CAPSULE ORAL EVERY 6 HOURS
Status: DISCONTINUED | OUTPATIENT
Start: 2022-06-14 | End: 2022-06-17 | Stop reason: HOSPADM

## 2022-06-14 RX ORDER — CLOTRIMAZOLE AND BETAMETHASONE DIPROPIONATE 10; .64 MG/G; MG/G
CREAM TOPICAL 2 TIMES DAILY
Status: DISCONTINUED | OUTPATIENT
Start: 2022-06-14 | End: 2022-06-17 | Stop reason: HOSPADM

## 2022-06-14 RX ADMIN — Medication 3 UNITS: at 16:52

## 2022-06-14 RX ADMIN — SODIUM CHLORIDE, PRESERVATIVE FREE 10 ML: 5 INJECTION INTRAVENOUS at 21:57

## 2022-06-14 RX ADMIN — SODIUM CHLORIDE, PRESERVATIVE FREE 10 ML: 5 INJECTION INTRAVENOUS at 14:10

## 2022-06-14 RX ADMIN — CASTOR OIL AND BALSAM, PERU: 788; 87 OINTMENT TOPICAL at 21:55

## 2022-06-14 RX ADMIN — MICONAZOLE NITRATE: 20 CREAM TOPICAL at 08:48

## 2022-06-14 RX ADMIN — DIPHENHYDRAMINE HYDROCHLORIDE 25 MG: 25 CAPSULE ORAL at 12:27

## 2022-06-14 RX ADMIN — METOPROLOL TARTRATE 25 MG: 25 TABLET, FILM COATED ORAL at 08:45

## 2022-06-14 RX ADMIN — SODIUM CHLORIDE, PRESERVATIVE FREE 10 ML: 5 INJECTION INTRAVENOUS at 05:26

## 2022-06-14 RX ADMIN — ACETAMINOPHEN 650 MG: 325 TABLET ORAL at 15:26

## 2022-06-14 RX ADMIN — Medication 3 UNITS: at 21:59

## 2022-06-14 RX ADMIN — DIPHENHYDRAMINE HYDROCHLORIDE 25 MG: 25 CAPSULE ORAL at 22:17

## 2022-06-14 RX ADMIN — Medication 2 UNITS: at 12:27

## 2022-06-14 RX ADMIN — CLOTRIMAZOLE AND BETAMETHASONE DIPROPIONATE: 10; .5 CREAM TOPICAL at 22:13

## 2022-06-14 RX ADMIN — IPRATROPIUM BROMIDE AND ALBUTEROL SULFATE 3 ML: .5; 3 SOLUTION RESPIRATORY (INHALATION) at 07:40

## 2022-06-14 RX ADMIN — POTASSIUM CHLORIDE 40 MEQ: 20 TABLET, EXTENDED RELEASE ORAL at 08:45

## 2022-06-14 RX ADMIN — SUCRALFATE 1 G: 1 TABLET ORAL at 16:49

## 2022-06-14 RX ADMIN — FUROSEMIDE 40 MG: 40 TABLET ORAL at 08:45

## 2022-06-14 RX ADMIN — Medication: at 08:46

## 2022-06-14 RX ADMIN — IPRATROPIUM BROMIDE AND ALBUTEROL SULFATE 3 ML: .5; 3 SOLUTION RESPIRATORY (INHALATION) at 19:41

## 2022-06-14 RX ADMIN — DIPHENHYDRAMINE HYDROCHLORIDE 25 MG: 25 CAPSULE ORAL at 17:54

## 2022-06-14 RX ADMIN — DIPHENHYDRAMINE HYDROCHLORIDE, ZINC ACETATE: 2; .1 CREAM TOPICAL at 21:57

## 2022-06-14 RX ADMIN — FUROSEMIDE 40 MG: 40 TABLET ORAL at 16:49

## 2022-06-14 RX ADMIN — METOPROLOL TARTRATE 25 MG: 25 TABLET, FILM COATED ORAL at 17:54

## 2022-06-14 RX ADMIN — RIVAROXABAN 20 MG: 20 TABLET, FILM COATED ORAL at 08:45

## 2022-06-14 RX ADMIN — SUCRALFATE 1 G: 1 TABLET ORAL at 08:45

## 2022-06-14 RX ADMIN — SUCRALFATE 1 G: 1 TABLET ORAL at 11:35

## 2022-06-14 RX ADMIN — PREDNISONE 40 MG: 20 TABLET ORAL at 12:27

## 2022-06-14 RX ADMIN — CASTOR OIL AND BALSAM, PERU: 788; 87 OINTMENT TOPICAL at 09:24

## 2022-06-14 RX ADMIN — Medication: at 17:54

## 2022-06-14 RX ADMIN — PANTOPRAZOLE SODIUM 40 MG: 40 TABLET, DELAYED RELEASE ORAL at 08:45

## 2022-06-14 NOTE — PROGRESS NOTES
Comprehensive Nutrition Assessment    Type and Reason for Visit: Reassess    Nutrition Recommendations/Plan:   1. Continue current diet  2. Discontinue nepro, try Ensure high protein TID     Nutrition Assessment:    Chart reviewed; patient reports an okay appetite. She had eaten all of her breakfast this morning but did not drink the Nepro. States she does not like this. Offered to try other supplements and patient initially declined. Patient reports not really eating a lot of meat but does like roast beef, chicken salad, and tuna salad. Discussed the importance of protein/nutrition and encouraged patient to try a different supplement to ensure her protein needs are being met. Will send ensure high protein. Encouraged intake of meals. Menu at bedside. Patient Vitals for the past 168 hrs:   % Diet Eaten   06/10/22 0852 76 - 100%        Nutrition Related Findings:    BM 6/12  Lasix, Humalog, protonix, KCl  [phone]    Wound Type: Unstageable,Deep tissue injury,Skin tears    Current Nutrition Intake & Therapies:  Average Meal Intake: %  Average Supplement Intake: 1-25%  ADULT DIET Dysphagia - Soft & Bite Sized  ADULT ORAL NUTRITION SUPPLEMENT Breakfast, Dinner; Renal Supplement    Anthropometric Measures:  Height: 5' 3\" (160 cm)  Ideal Body Weight (IBW): 115 lbs (52 kg)     Current Body Wt:  136.9 kg (301 lb 13 oz), 264.2 % IBW. Current BMI (kg/m2): 53.5                          BMI Category: Obese class 3 (BMI 40.0 or greater)    Estimated Daily Nutrient Needs:  Energy Requirements Based On: Formula  Weight Used for Energy Requirements: Current  Energy (kcal/day): 2029 kcal (BMR 1899 x 1. 2AF -250kcal)  Weight Used for Protein Requirements: Ideal  Protein (g/day): 105-131g (2.0-2.5 g/kg IBW)  Method Used for Fluid Requirements: Other (comment)  Fluid (ml/day): 1800 mL or per MD    Nutrition Diagnosis:   · Increased nutrient needs related to  (wound healing, body habitus) as evidenced by  (PI per flowsheets, BMI  >50)    Nutrition Interventions:   Food and/or Nutrient Delivery: Continue current diet,Modify oral nutrition supplement  Nutrition Education/Counseling: No recommendations at this time  Coordination of Nutrition Care: Continue to monitor while inpatient,Interdisciplinary rounds       Goals:  Previous Goal Met: Goal(s) achieved  Goals:  (PO intake >70% of meals/ONS next 5-7 days)       Nutrition Monitoring and Evaluation:   Behavioral-Environmental Outcomes: None identified  Food/Nutrient Intake Outcomes: Food and nutrient intake,Supplement intake  Physical Signs/Symptoms Outcomes: Biochemical data,Skin,Weight,Fluid status or edema,Hemodynamic status    Discharge Planning:    Continue current diet,Continue oral nutrition supplement    Melody Turk RD  Contact: ext [phone]

## 2022-06-14 NOTE — PROGRESS NOTES
Infectious Disease progress      Impression  Chronic displaced femoral neck fracture of the left hip  Possible Osteomyelitis of the left hip  Nonambulatory for greater than 2 years  MRI - pelvis- 5/3- L femoral neck displaced fracture with fluid in the  fracture gap and edema/enhancement & surrounding marrow , soft tissues. Findings may reflect a healing fracture with possible superimposed  osteomyelitis/osteonecrosis. Right sacral and bilateral parasymphyseal pubic insufficiency fractures. RIGHT femoral head and LEFT greater trochanter nondisplaced fractures. S/p evaluation by Ortho  No plans for Surgery per Ortho  Recommendations for antibiotics. Patient continuing with antibiotic therapy  S/p Fluid aspiration by IR on 5/5  Cultures-NG. No organisms on GS  ( pt has been on Levaquin since 5/4 , may affect final culture results). S/p DC of Zosyn due to concern for drug-induced rash. Macular & erythematous rash on face, neck  Mostly on skin folds. ?    Drug rash vs fungal rash. Sudden onset and rapid spread favors drug rash. Off antibiotics    Acute on chronic hypoxic, hypercapnic respiratory failure  Back on BiPAP      Morbid obesity   BMI 54    ESR 64( was 58, 60)   CRP 6.2( was 7.4, 8.65)    Plan  -S/p DC Zosyn. Patient has been on close to 6 weeks of antibiotic therapy planned end date was 6/16. Patient was being treated empirically for possible septic arthritis based on MRI report that also indicated nonunion of  Displaced femoral fracture. Cultures were negative, Patient did not have fever, chills. --Continue probiotic  -Add Lotrisone follow ducal application on areas of rash. -Continue to monitor rash. -D/w Dr Bernie Broderick.     Possible adverse effects of long term antibiotics are inclusive of but not limited to following  BM suppression, neutropenia , cytopenias , aplastic anemia hemorrhage liver & renal dysfunction/ liver , renal failure  , GI dysfunction- N, V  Diarrhea,C.difficile disease, rash , allergy , anaphylaxis. toxic epidermal necrolysis  Neuro toxicity , seizure disorder  Side effects tend to be more pronounced in the elderly. D/w pt. Pt seen today. Awake  L/hip pain less than before. Rash noted, macular on erythematous base. ET per patient  Cayetano Martinez  is a 61/F with medical history significant for acute on chronic respiratory failure , atrial fibrillation chronic fracture left hip , currently being treated as osteomyelitis/septic arthritis of left hip based on MRI findings. Patient was  admitted on 05/23 for acute on chronic hypercapnic respiratory failure. Initially admitted to ICU and then transferred to step down on 05/24 after treating with BiPAP. Thereafter transferred back to ICU. Patient seen today on stepdown. ID service has been reconsulted for antibiotic recommendations. Plan from previous hospitalzation was for vancomycin/cefepime IV x6 weeks end date 6/16, to be switched to daptomycin on discharge. Patient had developed adverse effects to cefepime. Daptomycin was not covered by SNF.   Patient is currently on Vancomycin and Zosyn IV     Past Medical History:   Diagnosis Date    Acute on chronic respiratory failure with hypoxia and hypercapnia (HCC) 11/20/2020    Atrial fibrillation ([address] 11/4/2020    CHF (congestive heart failure) (HCC)     Diabetes ([facility]     Hypertension     Ill-defined condition     high cholesterol    Ill-defined condition     tachycardia    JOSLYN (obstructive sleep apnea) 11/20/2020    Other and unspecified symptoms and signs involving general sensations and perceptions     ruptured disc    S/P cardiac cath 11/4/2020    11/3/2020 nonobstructive disease     Past Surgical History:   Procedure Laterality Date    IN ROBLESE ELECTROPHYSIOL XM W/LEFT ATRIAL PACNG/REC N/A 11/23/2020    Lt Atrial Pace & Record During Ep Study performed by Nick Bates MD at [facility] CARDIAC CATH [facility]    IN COMPRE EP EVAL ABLTJ 3D MAPG TX SVT N/A 11/23/2020    ABLATION A-FLUTTER performed by Natalio Lackey MD at [facility] CARDIAC CATH LAB    DC ICAR CATHETER ABLATION ARRHYTHMIA ADD ON N/A 11/23/2020    Ablation Svt/Vt Add On performed by Natalio Lackey MD at [facility] CARDIAC CATH LAB    DC INTRACARDIAC ELECTROPHYSIOLOGIC 3D MAPPING N/A 11/23/2020    Ep 3d Mapping performed by Natalio Lackey MD at [facility] CARDIAC CATH LAB    DC STIM/PACING HEART POST IV DRUG INFU N/A 11/23/2020    Drug Stimulation performed by Natalio Lackey MD at [facility] CARDIAC CATH LAB     Allergies   Allergen Reactions    Cefepime Rash    Cefazolin Rash    Other Plant, Animal, Environmental Rash     Ivory soap caused rash on hands. Social History     Socioeconomic History    Marital status: SINGLE     Spouse name: Not on file    Number of children: Not on file    Years of education: Not on file    Highest education level: Not on file   Occupational History    Not on file   Tobacco Use    Smoking status: Former Smoker    Smokeless tobacco: Never Used   Substance and Sexual Activity    Alcohol use: No    Drug use: No    Sexual activity: Not on file   Other Topics Concern    Not on file   Social History Narrative    Not on file     Social Determinants of Health     Financial Resource Strain:     Difficulty of Paying Living Expenses: Not on file   Food Insecurity:     Worried About [address] [employer] in the Last Year: Not on file    Quita of Food in the Last Year: Not on file   Transportation Needs:     Lack of Transportation (Medical): Not on file    Lack of Transportation (Non-Medical):  Not on file   Physical Activity:     Days of Exercise per Week: Not on file    Minutes of Exercise per Session: Not on file   Stress:     Feeling of Stress : Not on file   Social Connections:     Frequency of Communication with Friends and Family: Not on file    Frequency of Social Gatherings with Friends and Family: Not on file    Attends [employer] Services: Not on file  Active Member of Clubs or Organizations: Not on file    Attends Club or Organization Meetings: Not on file    Marital Status: Not on file   Intimate Partner Violence:     Fear of Current or Ex-Partner: Not on file    Emotionally Abused: Not on file    Physically Abused: Not on file    Sexually Abused: Not on file   Housing Stability:     Unable to Pay for Housing in the Last Year: Not on file    Number of [place] in the Last Year: Not on file    Unstable Housing in the Last Year: Not on file     No family status information on file. Medication Documentation Review Audit    **Prior to Admission medications have not yet been reviewed for this encounter**         Review of Systems - Negative except those mentioned in H&P  PHYSICAL EXAM:  General:          Awake, cooperative, no acute distress    EENT:              EOMI. Anicteric sclerae. MMM  Resp:               CTA bilaterally, no wheezing or rales. No accessory muscle use  CV:                  Regular  rhythm,  No edema  GI:                   Soft, Non distended, Non tender. +Bowel sounds  Neurologic:      Alert and oriented X 3, normal speech,   Psych:             Good insight. Not anxious nor agitated  Skin:                No rashes. No jaundice.       MD Edel Contreras

## 2022-06-14 NOTE — PROGRESS NOTES
Problem: Pressure Injury - Risk of  Goal: *Prevention of pressure injury  Description: Document Gaston Scale and appropriate interventions in the flowsheet. Outcome: Progressing Towards Goal  Note: Pressure Injury Interventions:  Sensory Interventions: Assess changes in LOC,Assess need for specialty bed    Moisture Interventions: Absorbent underpads,Apply protective barrier, creams and emollients    Activity Interventions: Assess need for specialty bed,Increase time out of bed    Mobility Interventions: Assess need for specialty bed,Float heels    Nutrition Interventions: Document food/fluid/supplement intake    Friction and Shear Interventions: Apply protective barrier, creams and emollients,Feet elevated on foot rest                Problem: Pressure Injury - Risk of  Goal: *Prevention of pressure injury  Description: Document Gaston Scale and appropriate interventions in the flowsheet. Outcome: Progressing Towards Goal  Note: Pressure Injury Interventions:  Sensory Interventions: Assess changes in LOC,Assess need for specialty bed    Moisture Interventions: Absorbent underpads,Apply protective barrier, creams and emollients    Activity Interventions: Assess need for specialty bed,Increase time out of bed    Mobility Interventions: Assess need for specialty bed,Float heels    Nutrition Interventions: Document food/fluid/supplement intake    Friction and Shear Interventions: Apply protective barrier, creams and emollients,Feet elevated on foot rest                Problem: Falls - Risk of  Goal: *Absence of Falls  Description: Document Kandice Tucker Fall Risk and appropriate interventions in the flowsheet.   Outcome: Progressing Towards Goal  Note: Fall Risk Interventions:  Mobility Interventions: Assess mobility with egress test,Bed/chair exit alarm    Mentation Interventions: Adequate sleep, hydration, pain control,Bed/chair exit alarm    Medication Interventions: Assess postural VS orthostatic hypotension,Bed/chair exit alarm    Elimination Interventions: Bed/chair exit alarm,Call light in reach    History of Falls Interventions: Bed/chair exit alarm,Consult care management for discharge planning

## 2022-06-14 NOTE — PROGRESS NOTES
End of Shift Note    Bedside shift change report given to Mery SALAZAR   (oncoming nurse) by Subhash Weathers RN (offgoing nurse). Report included the following information SBAR    Shift worked:  7am-7pm     Shift summary and any significant changes:     no significant change from the privies shift   More rush upper body benadryl has been given as order   Concerns for physician to address:  none      Zone phone for oncoming shift:          Activity:  Activity Level: Bed Rest  Number times ambulated in hallways past shift: 0  Number of times OOB to chair past shift: 0    Cardiac:   Cardiac Monitoring: Yes      Cardiac Rhythm: Sinus Rhythm    Access:   Current line(s): PICC     Genitourinary:   Urinary status: incontinent    Respiratory:   O2 Device: Nasal cannula  Chronic home O2 use?: YES  Incentive spirometer at bedside: YES       GI:  Last Bowel Movement Date: 06/10/22  Current diet:  ADULT DIET Dysphagia - Soft & Bite Sized  ADULT ORAL NUTRITION SUPPLEMENT Breakfast, Dinner, Lunch; Low Calorie/High Protein  Passing flatus: YES  Tolerating current diet: YES       Pain Management:   Patient states pain is manageable on current regimen: YES    Skin:  Gaston Score: 17  Interventions: turn team, speciality bed and float heels    Patient Safety:  Fall Score:  Total Score: 2  Interventions: bed/chair alarm  High Fall Risk: Yes    Length of Stay:  Expected LOS: 3d 14h  Actual LOS: Cayla Dempsey RN

## 2022-06-15 LAB
BASOPHILS # BLD: 0 K/UL (ref 0–0.1)
BASOPHILS NFR BLD: 0 % (ref 0–1)
DIFFERENTIAL METHOD BLD: ABNORMAL
EOSINOPHIL # BLD: 0 K/UL (ref 0–0.4)
EOSINOPHIL NFR BLD: 0 % (ref 0–7)
ERYTHROCYTE [DISTWIDTH] IN BLOOD BY AUTOMATED COUNT: 17.5 % (ref 11.5–14.5)
GLUCOSE BLD STRIP.AUTO-MCNC: 173 MG/DL (ref 65–117)
GLUCOSE BLD STRIP.AUTO-MCNC: 184 MG/DL (ref 65–117)
GLUCOSE BLD STRIP.AUTO-MCNC: 260 MG/DL (ref 65–117)
GLUCOSE BLD STRIP.AUTO-MCNC: 271 MG/DL (ref 65–117)
HCT VFR BLD AUTO: 25.6 % (ref 35–47)
HGB BLD-MCNC: 7.3 G/DL (ref 11.5–16)
IMM GRANULOCYTES # BLD AUTO: 0.1 K/UL (ref 0–0.04)
IMM GRANULOCYTES NFR BLD AUTO: 1 % (ref 0–0.5)
LYMPHOCYTES # BLD: 0.7 K/UL (ref 0.8–3.5)
LYMPHOCYTES NFR BLD: 12 % (ref 12–49)
MCH RBC QN AUTO: 29.7 PG (ref 26–34)
MCHC RBC AUTO-ENTMCNC: 28.5 G/DL (ref 30–36.5)
MCV RBC AUTO: 104.1 FL (ref 80–99)
MONOCYTES # BLD: 0.2 K/UL (ref 0–1)
MONOCYTES NFR BLD: 4 % (ref 5–13)
NEUTS SEG # BLD: 4.9 K/UL (ref 1.8–8)
NEUTS SEG NFR BLD: 83 % (ref 32–75)
NRBC # BLD: 0.02 K/UL (ref 0–0.01)
NRBC BLD-RTO: 0.3 PER 100 WBC
PLATELET # BLD AUTO: 142 K/UL (ref 150–400)
PMV BLD AUTO: 12.1 FL (ref 8.9–12.9)
RBC # BLD AUTO: 2.46 M/UL (ref 3.8–5.2)
RBC MORPH BLD: ABNORMAL
SERVICE CMNT-IMP: ABNORMAL
WBC # BLD AUTO: 5.9 K/UL (ref 3.6–11)

## 2022-06-15 PROCEDURE — 82962 GLUCOSE BLOOD TEST: CPT

## 2022-06-15 PROCEDURE — 74011636637 HC RX REV CODE- 636/637: Performed by: GENERAL ACUTE CARE HOSPITAL

## 2022-06-15 PROCEDURE — 65270000046 HC RM TELEMETRY

## 2022-06-15 PROCEDURE — 85025 COMPLETE CBC W/AUTO DIFF WBC: CPT

## 2022-06-15 PROCEDURE — 74011636637 HC RX REV CODE- 636/637: Performed by: HOSPITALIST

## 2022-06-15 PROCEDURE — 74011250637 HC RX REV CODE- 250/637: Performed by: GENERAL ACUTE CARE HOSPITAL

## 2022-06-15 PROCEDURE — 74011250637 HC RX REV CODE- 250/637: Performed by: INTERNAL MEDICINE

## 2022-06-15 PROCEDURE — 36415 COLL VENOUS BLD VENIPUNCTURE: CPT

## 2022-06-15 PROCEDURE — 94640 AIRWAY INHALATION TREATMENT: CPT

## 2022-06-15 PROCEDURE — 77010033678 HC OXYGEN DAILY

## 2022-06-15 PROCEDURE — 99233 SBSQ HOSP IP/OBS HIGH 50: CPT | Performed by: INTERNAL MEDICINE

## 2022-06-15 PROCEDURE — 74011000250 HC RX REV CODE- 250: Performed by: INTERNAL MEDICINE

## 2022-06-15 PROCEDURE — 94660 CPAP INITIATION&MGMT: CPT

## 2022-06-15 RX ADMIN — CLOTRIMAZOLE AND BETAMETHASONE DIPROPIONATE: 10; .5 CREAM TOPICAL at 08:42

## 2022-06-15 RX ADMIN — PREDNISONE 40 MG: 20 TABLET ORAL at 08:42

## 2022-06-15 RX ADMIN — SODIUM CHLORIDE, PRESERVATIVE FREE 10 ML: 5 INJECTION INTRAVENOUS at 05:42

## 2022-06-15 RX ADMIN — POTASSIUM CHLORIDE 40 MEQ: 20 TABLET, EXTENDED RELEASE ORAL at 08:42

## 2022-06-15 RX ADMIN — METOPROLOL TARTRATE 25 MG: 25 TABLET, FILM COATED ORAL at 08:41

## 2022-06-15 RX ADMIN — Medication 3 UNITS: at 21:33

## 2022-06-15 RX ADMIN — FUROSEMIDE 40 MG: 40 TABLET ORAL at 17:29

## 2022-06-15 RX ADMIN — IPRATROPIUM BROMIDE AND ALBUTEROL SULFATE 3 ML: .5; 3 SOLUTION RESPIRATORY (INHALATION) at 19:34

## 2022-06-15 RX ADMIN — SODIUM CHLORIDE, PRESERVATIVE FREE 10 ML: 5 INJECTION INTRAVENOUS at 17:29

## 2022-06-15 RX ADMIN — Medication 2 UNITS: at 08:41

## 2022-06-15 RX ADMIN — SODIUM CHLORIDE, PRESERVATIVE FREE 10 ML: 5 INJECTION INTRAVENOUS at 21:34

## 2022-06-15 RX ADMIN — DIPHENHYDRAMINE HYDROCHLORIDE 25 MG: 25 CAPSULE ORAL at 17:30

## 2022-06-15 RX ADMIN — PANTOPRAZOLE SODIUM 40 MG: 40 TABLET, DELAYED RELEASE ORAL at 08:41

## 2022-06-15 RX ADMIN — RIVAROXABAN 20 MG: 20 TABLET, FILM COATED ORAL at 08:42

## 2022-06-15 RX ADMIN — Medication: at 08:42

## 2022-06-15 RX ADMIN — CASTOR OIL AND BALSAM, PERU: 788; 87 OINTMENT TOPICAL at 21:33

## 2022-06-15 RX ADMIN — SUCRALFATE 1 G: 1 TABLET ORAL at 12:16

## 2022-06-15 RX ADMIN — Medication 5 UNITS: at 17:29

## 2022-06-15 RX ADMIN — IPRATROPIUM BROMIDE AND ALBUTEROL SULFATE 3 ML: .5; 3 SOLUTION RESPIRATORY (INHALATION) at 08:31

## 2022-06-15 RX ADMIN — SUCRALFATE 1 G: 1 TABLET ORAL at 17:29

## 2022-06-15 RX ADMIN — Medication: at 17:29

## 2022-06-15 RX ADMIN — Medication 2 UNITS: at 12:16

## 2022-06-15 RX ADMIN — SUCRALFATE 1 G: 1 TABLET ORAL at 08:41

## 2022-06-15 RX ADMIN — CASTOR OIL AND BALSAM, PERU: 788; 87 OINTMENT TOPICAL at 08:42

## 2022-06-15 RX ADMIN — FUROSEMIDE 40 MG: 40 TABLET ORAL at 08:42

## 2022-06-15 RX ADMIN — METOPROLOL TARTRATE 25 MG: 25 TABLET, FILM COATED ORAL at 17:29

## 2022-06-15 RX ADMIN — DIPHENHYDRAMINE HYDROCHLORIDE 25 MG: 25 CAPSULE ORAL at 05:42

## 2022-06-15 RX ADMIN — DIPHENHYDRAMINE HYDROCHLORIDE 25 MG: 25 CAPSULE ORAL at 12:16

## 2022-06-15 RX ADMIN — CLOTRIMAZOLE AND BETAMETHASONE DIPROPIONATE: 10; .5 CREAM TOPICAL at 17:30

## 2022-06-15 NOTE — PROGRESS NOTES
Hospitalist Progress Note    NAME: Jose Almonte   :  1960   MRN:  [id number]       Assessment / Plan:  Acute on chronic hypoxic/hypercapnic respiratory failure-resolved  Left-sided pleural effusion thoracentesis (too small to tap)  JOSLYN/OHS on trilogy  COPD on O2  Was transferred out of ICU and went back d/t acute hypercapnic respiratory failure, improved with bipap, now on 3 liters, using Trilogy at night. Tapered off oral steroids  Continue trilogy device as per pulmonary. CM working for [employer] at [facility]  Seen by pulmonary  On 3 liters of oxygen at baseline  Leukocytosis likely d/t steroids    Acute on chronic anemia:  Hb dropped gradually, received 1 unit on   Stool occult positive  Protonix 40 q12 hours  S/p EGD on 6/3, no active bleeding, biopsy sent, results not that remarkable   Resumed Xarelto      Left hip fracture displaced  age undetermined  Left hip osteomyelitis/septic arthritis   Bilateral buttock and sacral wounds    -[facility] [place] evaluated no surgical intervention planned  -S/p Aspiration?/Bx by IR, culture negative. -ID recommends daptomycin and cefepime on discharge last admit.  End date . Calista Patience go to SNF with Daptomycin, so switched to vancomycin. MRSA screen neg -->Vanco DCed and cont Zosyn. On  zosyn held and start Vanco again. On  Vanco DC, monitor off ABx    Maculopapular rash, suspected drug rash  -Centrifugal rash, nonblanching, no mucosal involvement, no bulla or vesicle.  -Possibly secondary to Zosyn, DC'd  -DC'd vancomycin  -Avoid CHG bath  -Appreciate IDs help  -On Lasix          Metabolic alkalosis  Improved, has chronic alkalosis for compensation  Would avoid diamox, as intensivist thinks this caused her to go into respiratory failure second time    Chronic diastolic heart failure:  PAF  HTN   Continue amiodarone .    Cont Lasix   Holding xarelto    Steroid induced hyperglycemia-  A1c level 5.4 on , ISS    Morbid obesity/Impaired mobility/functional quadriplegia-supportive care     Hypokalemia: Resolved     DVT PRx: SCDs for now  [facility]    TOM: 6/16     Subjective:     Chief Complaint / Reason for Physician Visit  Continues to significant rash. Review of Systems:  Symptom Y/N Comments  Symptom Y/N Comments   Fever/Chills    Chest Pain     Poor Appetite    Edema     Cough    Abdominal Pain     Sputum    Joint Pain     SOB/CHOPRA    Pruritis/Rash     Nausea/vomit    Tolerating PT/OT     Diarrhea    Tolerating Diet     Constipation    Other       Could NOT obtain due to:      Objective:     VITALS:   Last 24hrs VS reviewed since prior progress note. Most recent are:  Patient Vitals for the past 24 hrs:   Temp Pulse Resp BP SpO2   06/15/22 1535 98.2 °F (36.8 °C) 74 25 (!) 112/50 96 %   06/15/22 1047 98.1 °F (36.7 °C) 77 21 (!) 133/48 100 %   06/15/22 0831 -- -- -- -- 94 %   06/15/22 0723 98 °F (36.7 °C) 81 20 (!) 119/53 97 %   06/15/22 0524 -- -- -- -- 95 %   06/15/22 0330 98.3 °F (36.8 °C) 69 23 (!) 115/52 91 %   06/14/22 2248 -- -- -- -- 94 %   06/14/22 2245 98.3 °F (36.8 °C) 74 24 (!) 120/51 100 %   06/14/22 1941 -- -- -- -- 97 %   06/14/22 1915 98.3 °F (36.8 °C) 76 24 (!) 137/51 99 %       Intake/Output Summary (Last 24 hours) at 6/15/2022 1636  Last data filed at 6/15/2022 1626  Gross per 24 hour   Intake 240 ml   Output 2050 ml   Net -1810 ml        I had a face to face encounter and independently examined this patient on 6/15/2022, as outlined below:  PHYSICAL EXAM:  General: Awake, No acute distress  EENT:  EOMI. Anicteric sclerae. MMM  Resp:  CTA bilaterally, no wheezing or rales. No accessory muscle use  CV:  Regular  rhythm, trace edema  GI:  Soft, Non distended, Non tender. +Bowel sounds  Neurologic:  Alert and oriented X 3, normal speech,   Psych:   Not anxious nor agitated  Skin:  Maculopapular rash involving trunk, abdomen, bilateral thigh, bilateral upper extremity.     Reviewed most current lab test results and cultures YES  Reviewed most current radiology test results   YES  Review and summation of old records today    NO  Reviewed patient's current orders and MAR    YES  PMH/SH reviewed - no change compared to H&P  ________________________________________________________________________  Care Plan discussed with:    Comments   Patient y    Family      RN y    Care Manager y    Consultant                        Multidiciplinary team rounds were held today with , nursing, pharmacist and clinical coordinator. Patient's plan of care was discussed; medications were reviewed and discharge planning was addressed. ________________________________________________________________________  Total NON critical care TIME: 36   Minutes    Total CRITICAL CARE TIME Spent:   Minutes non procedure based      Comments   >50% of visit spent in counseling and coordination of care     ________________________________________________________________________  Nicolas Chavez MD     Procedures: see electronic medical records for all procedures/Xrays and details which were not copied into this note but were reviewed prior to creation of Plan. LABS:  I reviewed today's most current labs and imaging studies.   Pertinent labs include:  Recent Labs     06/15/22  0256 06/14/22 0528 06/13/22  0308   WBC 5.9 7.5 7.2   HGB 7.3* 7.3* 7.0*   HCT 25.6* 25.6* 24.5*   * 130* 108*     Recent Labs     06/14/22  0528 06/13/22  0308    139   K 3.6 3.7   CL 93* 93*   CO2 42* 42*   * 108*   BUN 12 13   CREA 0.70 0.68   CA 8.3* 8.1*   ALB  --  1.9*   TBILI  --  1.1*   ALT  --  26       Signed: Nicolas Chavez MD

## 2022-06-15 NOTE — RT PROTOCOL NOTE
ADULT PROTOCOL: JET AEROSOL  REASSESSMENT    Patient  Shanti Childs     57 y.o.   female     6/15/2022  9:57 AM    Breath Sounds Pre Procedure: Right Breath Sounds: Diminished                               Left Breath Sounds: Diminished    Breath Sounds Post Procedure: Right Breath Sounds: Diminished                                 Left Breath Sounds: Diminished    Breathing pattern: Pre procedure Breathing Pattern: Tachypneic          Post procedure Breathing Pattern: Regular    Heart Rate: Pre procedure Pulse: 75           Post procedure Pulse: 70    Resp Rate: Pre procedure Respirations: 24           Post procedure Respirations: 20            Cough: Pre procedure Cough: Non-productive               Post procedure Cough: Non-productive         Oxygen: O2 Device: Nasal cannula   4L        SpO2: Pre procedure SpO2: 94 %                 Post procedure SpO2: 93 %      Nebulizer Therapy: Current medications Aerosolized Medications: DuoNeb            Problem List:   Patient Active Problem List   Diagnosis Code    COVID-19 ruled out Z20.822    A-fib ([facility] [address] I48.91    S/P cardiac cath Z98.890    Mixed hyperlipidemia E78.2    [facility] E66.[facility]    Acute respiratory failure (HCC) J96.00    Atrial flutter with rapid ventricular response (HCC) I48.92    JOSLYN (obstructive sleep apnea) G47.33    Acute on chronic respiratory failure with hypoxia and hypercapnia (HCC) J96.21, J96.22    SOB (shortness of breath) R06.02    Physical deconditioning R53.81    Counseling regarding advance care planning and goals of care Z71.89    Chronic pain G89.29    Dependence on supplemental oxygen Z99.81    Essential hypertension I10    Heart failure (HCC) I50.9    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

## 2022-06-15 NOTE — PROGRESS NOTES
Infectious Disease progress      Impression  Chronic displaced femoral neck fracture of the left hip  Possible Osteomyelitis of the left hip  Nonambulatory for greater than 2 years  MRI - pelvis- 5/3- L femoral neck displaced fracture with fluid in the  fracture gap and edema/enhancement & surrounding marrow , soft tissues. Findings may reflect a healing fracture with possible superimposed  osteomyelitis/osteonecrosis. Right sacral and bilateral parasymphyseal pubic insufficiency fractures. RIGHT femoral head and LEFT greater trochanter nondisplaced fractures. S/p evaluation by Ortho  No plans for Surgery per Ortho  Recommendations for antibiotics. Patient continuing with antibiotic therapy  S/p Fluid aspiration by IR on 5/5  Cultures-NG. No organisms on GS  ( pt has been on Levaquin since 5/4 , may affect final culture results). S/p DC of Zosyn due to concern for drug-induced rash. Macular & erythematous rash on face, neck  Mostly on skin folds. ?    Drug rash vs fungal rash. Sudden onset and rapid spread favors drug rash. Off antibiotics, no significant improvement. Other causes? CHG? Acute on chronic hypoxic, hypercapnic respiratory failure  Improved      Morbid obesity   BMI 54    ESR 64( was 58, 60)   CRP 6.2( was 7.4, 8.65)    Plan  -  Patient has been on close to 6 weeks of antibiotic therapy planned end date was 6/16. S/p DC of Zosyn over the weekend due to concerns about drug rash . --Continue to monitor rash. -We will sign off please call if needed. Possible adverse effects of long term antibiotics are inclusive of but not limited to following  BM suppression, neutropenia , cytopenias , aplastic anemia hemorrhage liver & renal dysfunction/ liver , renal failure  , GI dysfunction- N, V  Diarrhea,C.difficile disease, rash , allergy , anaphylaxis. toxic epidermal necrolysis  Neuro toxicity , seizure disorder  Side effects tend to be more pronounced in the elderly. D/w pt.       Pt seen today. Awake  L/hip pain less than before. Rash still present. Rash itching  per patient.     Past Medical History:   Diagnosis Date    Acute on chronic respiratory failure with hypoxia and hypercapnia ([facility]) 11/20/2020    Atrial fibrillation ([facility] 11/4/2020    CHF (congestive heart failure) ([facility])     Diabetes ([facility] [address]     Hypertension     Ill-defined condition     high cholesterol    Ill-defined condition     tachycardia    JOSLYN (obstructive sleep apnea) 11/20/2020    Other and unspecified symptoms and signs involving general sensations and perceptions     ruptured disc    S/P cardiac cath 11/4/2020    11/3/2020 nonobstructive disease     Past Surgical History:   Procedure Laterality Date    MS COMPRE ELECTROPHYSIOL XM W/LEFT ATRIAL PACNG/REC N/A 11/23/2020    Lt Atrial Pace & Record During Ep Study performed by Geneva Aldrich MD at [facility] CARD[facility]AC CATH LAB    MS COMPRE EP EVAL ABLTJ 3D MAPG TX SVT N/A 11/23/2020    ABLATION A-FLUTTER performed by Geneva Aldrich MD at [facility] CARDIAC CATH LAB    MS ICAR CATHETER ABLATION ARRHYTHMIA ADD ON N/A 11/23/2020    Ablation Svt/Vt Add On performed by Geneva Aldrich MD at [facility] CARDIAC CATH LAB    MS INTRACARDIAC ELECTROPHYSIOLOGIC 3D MAPPING N/A 11/23/2020    Ep 3d Mapping performed by Geneva Aldrich MD at [facility] CARDIAC CATH LAB    MS STIM/PACING HEART POST IV DRUG INFU N/A 11/23/2020    Drug Stimulation performed by Geneva Aldrich MD at [facility] CARDIAC CATH LAB     Allergies   Allergen Reactions    Cefepime Rash    Cefazolin Rash    Other Plant, Animal, Environmental Rash     Ivory soap caused rash on hands.      Social History     Socioeconomic History    Marital status: SINGLE     Spouse name: Not on file    Number of children: Not on file    Years of education: Not on file    Highest education level: Not on file   Occupational History    Not on file   Tobacco Use    Smoking status: Former Smoker    Smokeless tobacco: Never Used Substance and Sexual Activity    Alcohol use: No    Drug use: No    Sexual activity: Not on file   Other Topics Concern    Not on file   Social History Narrative    Not on file     Social Determinants of Health     Financial Resource Strain:     Difficulty of Paying Living Expenses: Not on file   Food Insecurity:     Worried About Running Out of Food in the Last Year: Not on file    Quita of Food in the Last Year: Not on file   Transportation Needs:     Lack of Transportation (Medical): Not on file    Lack of Transportation (Non-Medical): Not on file   Physical Activity:     Days of Exercise per Week: Not on file    Minutes of Exercise per Session: Not on file   Stress:     Feeling of Stress : Not on file   Social Connections:     Frequency of Communication with Friends and Family: Not on file    Frequency of Social Gatherings with Friends and Family: Not on file    Attends Rastafari Services: Not on file    Active Member of [address] [place] [address] or Organizations: Not on file    Attends Club or Organization Meetings: Not on file    Marital Status: Not on file   Intimate Partner Violence:     Fear of Current or Ex-Partner: Not on file    Emotionally Abused: Not on file    Physically Abused: Not on file    Sexually Abused: Not on file   Housing Stability:     Unable to Pay for Housing in the Last Year: Not on file    Number of [place] in the Last Year: Not on file    Unstable Housing in the Last Year: Not on file     No family status information on file. Medication Documentation Review Audit    **Prior to Admission medications have not yet been reviewed for this encounter**         Review of Systems - Negative except those mentioned in H&P  PHYSICAL EXAM:  General:          Awake, cooperative, no acute distress    EENT:              EOMI. Anicteric sclerae. MMM  Resp:               CTA bilaterally, no wheezing or rales.   No accessory muscle use  CV:                  Regular  rhythm,  No edema  GI:                   Soft, Non distended, Non tender. +Bowel sounds  Neurologic:      Alert and oriented X 3, normal speech,   Psych:             Good insight. Not anxious nor agitated  Skin:                No rashes. No jaundice.       Goldie Cote MD [address]

## 2022-06-16 LAB
ANION GAP SERPL CALC-SCNC: 4 MMOL/L (ref 5–15)
BASOPHILS # BLD: 0 K/UL (ref 0–0.1)
BASOPHILS NFR BLD: 0 % (ref 0–1)
BUN SERPL-MCNC: 21 MG/DL (ref 6–20)
BUN/CREAT SERPL: 25 (ref 12–20)
CALCIUM SERPL-MCNC: 8.7 MG/DL (ref 8.5–10.1)
CHLORIDE SERPL-SCNC: 94 MMOL/L (ref 97–108)
CO2 SERPL-SCNC: 39 MMOL/L (ref 21–32)
CREAT SERPL-MCNC: 0.84 MG/DL (ref 0.55–1.02)
DIFFERENTIAL METHOD BLD: ABNORMAL
EOSINOPHIL # BLD: 0.2 K/UL (ref 0–0.4)
EOSINOPHIL NFR BLD: 3 % (ref 0–7)
ERYTHROCYTE [DISTWIDTH] IN BLOOD BY AUTOMATED COUNT: 17.4 % (ref 11.5–14.5)
GLUCOSE BLD STRIP.AUTO-MCNC: 116 MG/DL (ref 65–117)
GLUCOSE BLD STRIP.AUTO-MCNC: 123 MG/DL (ref 65–117)
GLUCOSE BLD STRIP.AUTO-MCNC: 182 MG/DL (ref 65–117)
GLUCOSE BLD STRIP.AUTO-MCNC: 249 MG/DL (ref 65–117)
GLUCOSE SERPL-MCNC: 107 MG/DL (ref 65–100)
HCT VFR BLD AUTO: 25.5 % (ref 35–47)
HGB BLD-MCNC: 7.5 G/DL (ref 11.5–16)
IMM GRANULOCYTES # BLD AUTO: 0.1 K/UL (ref 0–0.04)
IMM GRANULOCYTES NFR BLD AUTO: 1 % (ref 0–0.5)
LYMPHOCYTES # BLD: 1.2 K/UL (ref 0.8–3.5)
LYMPHOCYTES NFR BLD: 18 % (ref 12–49)
MCH RBC QN AUTO: 30.2 PG (ref 26–34)
MCHC RBC AUTO-ENTMCNC: 29.4 G/DL (ref 30–36.5)
MCV RBC AUTO: 102.8 FL (ref 80–99)
MONOCYTES # BLD: 0.4 K/UL (ref 0–1)
MONOCYTES NFR BLD: 6 % (ref 5–13)
NEUTS SEG # BLD: 5 K/UL (ref 1.8–8)
NEUTS SEG NFR BLD: 72 % (ref 32–75)
NRBC # BLD: 0 K/UL (ref 0–0.01)
NRBC BLD-RTO: 0 PER 100 WBC
PLATELET # BLD AUTO: 190 K/UL (ref 150–400)
PMV BLD AUTO: 11.3 FL (ref 8.9–12.9)
POTASSIUM SERPL-SCNC: 4 MMOL/L (ref 3.5–5.1)
RBC # BLD AUTO: 2.48 M/UL (ref 3.8–5.2)
SERVICE CMNT-IMP: ABNORMAL
SERVICE CMNT-IMP: NORMAL
SODIUM SERPL-SCNC: 137 MMOL/L (ref 136–145)
WBC # BLD AUTO: 6.8 K/UL (ref 3.6–11)

## 2022-06-16 PROCEDURE — 94660 CPAP INITIATION&MGMT: CPT

## 2022-06-16 PROCEDURE — 77010033678 HC OXYGEN DAILY

## 2022-06-16 PROCEDURE — 65270000046 HC RM TELEMETRY

## 2022-06-16 PROCEDURE — 36415 COLL VENOUS BLD VENIPUNCTURE: CPT

## 2022-06-16 PROCEDURE — 74011636637 HC RX REV CODE- 636/637: Performed by: GENERAL ACUTE CARE HOSPITAL

## 2022-06-16 PROCEDURE — 74011250637 HC RX REV CODE- 250/637: Performed by: INTERNAL MEDICINE

## 2022-06-16 PROCEDURE — 85025 COMPLETE CBC W/AUTO DIFF WBC: CPT

## 2022-06-16 PROCEDURE — 74011000250 HC RX REV CODE- 250: Performed by: INTERNAL MEDICINE

## 2022-06-16 PROCEDURE — 82962 GLUCOSE BLOOD TEST: CPT

## 2022-06-16 PROCEDURE — 94640 AIRWAY INHALATION TREATMENT: CPT

## 2022-06-16 PROCEDURE — 74011636637 HC RX REV CODE- 636/637: Performed by: HOSPITALIST

## 2022-06-16 PROCEDURE — 74011250637 HC RX REV CODE- 250/637: Performed by: GENERAL ACUTE CARE HOSPITAL

## 2022-06-16 PROCEDURE — 80048 BASIC METABOLIC PNL TOTAL CA: CPT

## 2022-06-16 RX ADMIN — SODIUM CHLORIDE, PRESERVATIVE FREE 10 ML: 5 INJECTION INTRAVENOUS at 13:54

## 2022-06-16 RX ADMIN — Medication 2 UNITS: at 21:15

## 2022-06-16 RX ADMIN — POTASSIUM CHLORIDE 40 MEQ: 20 TABLET, EXTENDED RELEASE ORAL at 09:40

## 2022-06-16 RX ADMIN — CLOTRIMAZOLE AND BETAMETHASONE DIPROPIONATE: 10; .5 CREAM TOPICAL at 13:34

## 2022-06-16 RX ADMIN — Medication 2 UNITS: at 17:53

## 2022-06-16 RX ADMIN — METOPROLOL TARTRATE 25 MG: 25 TABLET, FILM COATED ORAL at 09:40

## 2022-06-16 RX ADMIN — DIPHENHYDRAMINE HYDROCHLORIDE 25 MG: 25 CAPSULE ORAL at 06:04

## 2022-06-16 RX ADMIN — SUCRALFATE 1 G: 1 TABLET ORAL at 09:40

## 2022-06-16 RX ADMIN — CASTOR OIL AND BALSAM, PERU: 788; 87 OINTMENT TOPICAL at 09:43

## 2022-06-16 RX ADMIN — PREDNISONE 40 MG: 20 TABLET ORAL at 09:40

## 2022-06-16 RX ADMIN — DIPHENHYDRAMINE HYDROCHLORIDE 25 MG: 25 CAPSULE ORAL at 13:33

## 2022-06-16 RX ADMIN — Medication: at 18:02

## 2022-06-16 RX ADMIN — FUROSEMIDE 40 MG: 40 TABLET ORAL at 17:54

## 2022-06-16 RX ADMIN — DIPHENHYDRAMINE HYDROCHLORIDE 25 MG: 25 CAPSULE ORAL at 17:54

## 2022-06-16 RX ADMIN — RIVAROXABAN 20 MG: 20 TABLET, FILM COATED ORAL at 09:40

## 2022-06-16 RX ADMIN — METOPROLOL TARTRATE 25 MG: 25 TABLET, FILM COATED ORAL at 17:54

## 2022-06-16 RX ADMIN — SODIUM CHLORIDE, PRESERVATIVE FREE 10 ML: 5 INJECTION INTRAVENOUS at 06:04

## 2022-06-16 RX ADMIN — DIPHENHYDRAMINE HYDROCHLORIDE, ZINC ACETATE: 2; .1 CREAM TOPICAL at 09:44

## 2022-06-16 RX ADMIN — SODIUM CHLORIDE, PRESERVATIVE FREE 10 ML: 5 INJECTION INTRAVENOUS at 21:15

## 2022-06-16 RX ADMIN — CASTOR OIL AND BALSAM, PERU: 788; 87 OINTMENT TOPICAL at 21:15

## 2022-06-16 RX ADMIN — CLOTRIMAZOLE AND BETAMETHASONE DIPROPIONATE: 10; .5 CREAM TOPICAL at 18:03

## 2022-06-16 RX ADMIN — PANTOPRAZOLE SODIUM 40 MG: 40 TABLET, DELAYED RELEASE ORAL at 09:40

## 2022-06-16 RX ADMIN — IPRATROPIUM BROMIDE AND ALBUTEROL SULFATE 3 ML: .5; 3 SOLUTION RESPIRATORY (INHALATION) at 19:27

## 2022-06-16 RX ADMIN — FUROSEMIDE 40 MG: 40 TABLET ORAL at 09:40

## 2022-06-16 RX ADMIN — SUCRALFATE 1 G: 1 TABLET ORAL at 17:54

## 2022-06-16 RX ADMIN — SUCRALFATE 1 G: 1 TABLET ORAL at 13:33

## 2022-06-16 RX ADMIN — IPRATROPIUM BROMIDE AND ALBUTEROL SULFATE 3 ML: .5; 3 SOLUTION RESPIRATORY (INHALATION) at 08:16

## 2022-06-16 RX ADMIN — Medication: at 09:41

## 2022-06-16 NOTE — PROGRESS NOTES
ADULT PROTOCOL: JET AEROSOL ASSESSMENT    Patient  Philip Holm     57 y.o.   female     6/16/2022  2:43 AM    Breath Sounds Pre Procedure: Right Breath Sounds: Diminished                               Left Breath Sounds: Diminished    Breath Sounds Post Procedure: Right Breath Sounds: Diminished                                 Left Breath Sounds: Diminished    Breathing pattern: Pre procedure Breathing Pattern: Tachypneic          Post procedure Breathing Pattern: Regular    Heart Rate: Pre procedure Pulse: 73           Post procedure Pulse: 70    Resp Rate: Pre procedure Respirations: 25           Post procedure Respirations: 20        Oxygen: O2 Device: BIPAP (resmed)   Flow rate (L/min) 3     Changed: NO    SpO2: Pre procedure SpO2: 99 %   with oxygen              Post procedure SpO2: 93 %  with oxygen    Nebulizer Therapy: Current medications Aerosolized Medications: DuoNeb bid      Problem List:   Patient Active Problem List   Diagnosis Code    COVID-19 ruled out Z20.822    A-fib ([facility] [address] I48.91    S/P cardiac cath Z98.890    Mixed hyperlipidemia E78.2    Morbid obesity (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

## 2022-06-16 NOTE — PROGRESS NOTES
End of Shift Note    Bedside shift change report given to Saskia Gorman RN (oncoming nurse) by Kade Alston (offgoing nurse). Report included the following information SBAR, Kardex, ED Summary, Intake/Output, MAR and Recent Results    Shift worked:  [id number]     Shift summary and any significant changes:    PICC line cleansed with CHG but no CHG bath given to let skin rest from rash. Concerns for physician to address:       Zone phone for oncoming shift:          Activity:  Activity Level: Bed Rest  Number times ambulated in hallways past shift: 0  Number of times OOB to chair past shift: 0    Cardiac:   Cardiac Monitoring: Yes      Cardiac Rhythm: Sinus Rhythm    Access:   Current line(s): PICC     Genitourinary:   Urinary status: voiding and external catheter    Respiratory:   O2 Device: BIPAP (Trilogy)  Chronic home O2 use?: YES  Incentive spirometer at bedside: NO       GI:  Last Bowel Movement Date: 06/10/22  Current diet:  ADULT DIET Dysphagia - Soft & Bite Sized  ADULT ORAL NUTRITION SUPPLEMENT Breakfast, Dinner, Lunch; Low Calorie/High Protein  Passing flatus: YES  Tolerating current diet: YES       Pain Management:   Patient states pain is manageable on current regimen: YES    Skin:  Gaston Score: 14  Interventions: turn team, speciality bed, float heels, increase time out of bed, foam dressing, PT/OT consult and internal/external urinary devices    Patient Safety:  Fall Score:  Total Score: 2  Interventions: bed/chair alarm, gripper socks, pt to call before getting OOB and stay with me (per policy)  High Fall Risk: Yes    Length of Stay:  Expected LOS: 3d 14h  Actual LOS: [address]

## 2022-06-16 NOTE — PROGRESS NOTES
ADULT PROTOCOL: JET AEROSOL ASSESSMENT    Patient  Didi Lance     57 y.o.   female     6/16/2022  5:14 PM    Breath Sounds Pre Procedure: Right Breath Sounds: Diminished                               Left Breath Sounds: Diminished    Breath Sounds Post Procedure: Right Breath Sounds: Diminished                                 Left Breath Sounds: Diminished    Breathing pattern: Pre procedure Breathing Pattern: Regular          Post procedure Breathing Pattern: Regular    Heart Rate: Pre procedure Pulse: 73           Post procedure Pulse: 70    Resp Rate: Pre procedure Respirations: 21           Post procedure Respirations: 20    Cough: Pre procedure Cough: Non-productive               Post procedure Cough: Non-productive    Oxygen: O2 Device: Nasal cannula    SpO2: Pre procedure SpO2: 99 %                Post procedure SpO2: 93 %      Nebulizer Therapy: Current medications Aerosolized Medications: DuoNeb    Problem List:   Patient Active Problem List   Diagnosis Code    COVID-19 ruled out Z20.822    A-fib ([facility]) I48.91    S/P cardiac cath Z98.890    Mixed hyperlipidemia E78.2    Morbid obesity ([facility]) E66.01    Acute respiratory failure ([facility]) J96.00    Atrial flutter with rapid ventricular response ([facility]) I48.92    JOSLYN (obstructive sleep apnea) G47.33    Acute on chronic respiratory failure with hypoxia and hypercapnia ([facility]) J96.21, J96.22    SOB (shortness of breath) R06.02    Physical deconditioning R53.81    Counseling regarding advance care planning and goals of care Z71.89    Chronic pain G89.29    Dependence on supplemental oxygen Z99.81    Essential hypertension I10    Heart failure ([facility]) I50.9    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

## 2022-06-16 NOTE — PROGRESS NOTES
Hospitalist Progress Note    NAME: Shanti Childs   :  1960   MRN:  [id number]       Assessment / Plan:  Acute on chronic hypoxic/hypercapnic respiratory failure-resolved  Left-sided pleural effusion thoracentesis (too small to tap)  JOSLYN/OHS on trilogy  COPD on O2  Was transferred out of ICU and went back d/t acute hypercapnic respiratory failure, improved with bipap, now on 3 liters, using Trilogy at night. Tapered off oral steroids  Continue trilogy device as per pulmonary. CM working for [employer] at [place]  Seen by pulmonary  On 3 liters of oxygen at baseline  Leukocytosis likely d/t steroids    Acute on chronic anemia:  Hb dropped gradually, received 1 unit on   Stool occult positive  Protonix 40 q12 hours  S/p EGD on 6/3, no active bleeding, biopsy sent, results not that remarkable   Resumed Xarelto      Left hip fracture displaced  age undetermined  Left hip osteomyelitis/septic arthritis   Bilateral buttock and sacral wounds    -[facility] evaluated no surgical intervention planned  -S/p Aspiration?/Bx by IR, culture negative. -ID recommends daptomycin and cefepime on discharge last admit.  End date . Kristina Kaye go to SNF with Daptomycin, so switched to vancomycin. MRSA screen neg -->Vanco DCed and cont Zosyn. On  zosyn held and start Vanco again. On  Vanco DC, monitor off ABx    Maculopapular rash, suspected drug rash minimally improved  -Centrifugal rash, nonblanching, no mucosal involvement, no bulla or vesicle.  -Possibly secondary to Zosyn, DC'd  -DC'd vancomycin  -Avoid CHG bath  -Appreciate IDs help  -On Lasix          Metabolic alkalosis  Improved, has chronic alkalosis for compensation  Would avoid diamox, as intensivist thinks this caused her to go into respiratory failure second time    Chronic diastolic heart failure:  PAF  HTN   Continue amiodarone .    Cont Lasix   Continue Xarelto    Steroid induced hyperglycemia-  A1c level 5.4 on , ISS    Morbid obesity/Impaired mobility/functional quadriplegia-supportive care     Hypokalemia: Resolved     DVT PRx:   NOK    TOM: 6/17  Discharge tomorrow to [facility]. Subjective:     Chief Complaint / Reason for Physician Visit  Continues to significant rash. Minimally improved    Review of Systems:  Symptom Y/N Comments  Symptom Y/N Comments   Fever/Chills    Chest Pain     Poor Appetite    Edema     Cough    Abdominal Pain     Sputum    Joint Pain     SOB/CHOPRA    Pruritis/Rash     Nausea/vomit    Tolerating PT/OT     Diarrhea    Tolerating Diet     Constipation    Other       Could NOT obtain due to:      Objective:     VITALS:   Last 24hrs VS reviewed since prior progress note. Most recent are:  Patient Vitals for the past 24 hrs:   Temp Pulse Resp BP SpO2   06/16/22 1052 97.9 °F (36.6 °C) 72 25 (!) 122/51 92 %   06/16/22 0816 -- -- -- -- 98 %   06/16/22 0758 -- 84 22 -- 99 %   06/16/22 0739 98 °F (36.7 °C) 77 26 (!) 141/51 99 %   06/16/22 0352 98.1 °F (36.7 °C) 68 20 (!) 111/54 99 %   06/16/22 0347 -- -- -- -- (!) 84 %   06/16/22 0233 -- -- -- -- 100 %   06/15/22 2325 98.4 °F (36.9 °C) 72 20 (!) 116/46 97 %   06/15/22 2216 -- -- -- -- 99 %   06/15/22 1950 98.1 °F (36.7 °C) 90 25 (!) 127/48 99 %   06/15/22 1934 -- -- -- -- 99 %   06/15/22 1535 98.2 °F (36.8 °C) 74 25 (!) 112/50 96 %       Intake/Output Summary (Last 24 hours) at 6/16/2022 1255  Last data filed at 6/15/2022 1626  Gross per 24 hour   Intake --   Output 650 ml   Net -650 ml        I had a face to face encounter and independently examined this patient on 6/16/2022, as outlined below:  PHYSICAL EXAM:  General: Awake, No acute distress  EENT:  EOMI. Anicteric sclerae. MMM  Resp:  CTA bilaterally, no wheezing or rales. No accessory muscle use  CV:  Regular  rhythm, trace edema  GI:  Soft, Non distended, Non tender.   +Bowel sounds  Neurologic:  Alert and oriented X 3, normal speech,   Psych:   Not anxious nor agitated  Skin:  Maculopapular rash involving trunk, abdomen, bilateral thigh, bilateral upper extremity. More confluent rash over left upper chest.    Reviewed most current lab test results and cultures  YES  Reviewed most current radiology test results   YES  Review and summation of old records today    NO  Reviewed patient's current orders and MAR    YES  PMH/SH reviewed - no change compared to H&P  ________________________________________________________________________  Care Plan discussed with:    Comments   Patient y    Family      RN y    Care Manager y    Consultant                        Multidiciplinary team rounds were held today with , nursing, pharmacist and clinical coordinator. Patient's plan of care was discussed; medications were reviewed and discharge planning was addressed. ________________________________________________________________________  Total NON critical care TIME: 36   Minutes    Total CRITICAL CARE TIME Spent:   Minutes non procedure based      Comments   >50% of visit spent in counseling and coordination of care     ________________________________________________________________________  Adi Hector MD     Procedures: see electronic medical records for all procedures/Xrays and details which were not copied into this note but were reviewed prior to creation of Plan. LABS:  I reviewed today's most current labs and imaging studies.   Pertinent labs include:  Recent Labs     06/16/22  0609 06/15/22  0256 06/14/22  0528   WBC 6.8 5.9 7.5   HGB 7.5* 7.3* 7.3*   HCT 25.5* 25.6* 25.6*    142* 130*     Recent Labs     06/16/22  0609 06/14/22  0528    140   K 4.0 3.6   CL 94* 93*   CO2 39* 42*   * 122*   BUN 21* 12   CREA 0.84 0.70   CA 8.7 8.3*       Signed: Adi Hector MD

## 2022-06-17 VITALS
SYSTOLIC BLOOD PRESSURE: 123 MMHG | HEIGHT: 63 IN | WEIGHT: 293 LBS | TEMPERATURE: 98.3 F | RESPIRATION RATE: 18 BRPM | DIASTOLIC BLOOD PRESSURE: 52 MMHG | BODY MASS INDEX: 51.91 KG/M2 | OXYGEN SATURATION: 98 % | HEART RATE: 73 BPM

## 2022-06-17 LAB
CK SERPL-CCNC: 8 U/L (ref 26–192)
COVID-19 RAPID TEST, COVR: NOT DETECTED
GLUCOSE BLD STRIP.AUTO-MCNC: 160 MG/DL (ref 65–117)
GLUCOSE BLD STRIP.AUTO-MCNC: 197 MG/DL (ref 65–117)
GLUCOSE BLD STRIP.AUTO-MCNC: 260 MG/DL (ref 65–117)
SERVICE CMNT-IMP: ABNORMAL
SOURCE, COVRS: NORMAL

## 2022-06-17 PROCEDURE — 74011000250 HC RX REV CODE- 250: Performed by: INTERNAL MEDICINE

## 2022-06-17 PROCEDURE — 82962 GLUCOSE BLOOD TEST: CPT

## 2022-06-17 PROCEDURE — 94640 AIRWAY INHALATION TREATMENT: CPT

## 2022-06-17 PROCEDURE — 87635 SARS-COV-2 COVID-19 AMP PRB: CPT

## 2022-06-17 PROCEDURE — 74011636637 HC RX REV CODE- 636/637: Performed by: GENERAL ACUTE CARE HOSPITAL

## 2022-06-17 PROCEDURE — 74011636637 HC RX REV CODE- 636/637: Performed by: HOSPITALIST

## 2022-06-17 PROCEDURE — 94660 CPAP INITIATION&MGMT: CPT

## 2022-06-17 PROCEDURE — 74011250637 HC RX REV CODE- 250/637: Performed by: INTERNAL MEDICINE

## 2022-06-17 PROCEDURE — 74011250637 HC RX REV CODE- 250/637: Performed by: GENERAL ACUTE CARE HOSPITAL

## 2022-06-17 PROCEDURE — 77010033678 HC OXYGEN DAILY

## 2022-06-17 PROCEDURE — 36415 COLL VENOUS BLD VENIPUNCTURE: CPT

## 2022-06-17 PROCEDURE — 82550 ASSAY OF CK (CPK): CPT

## 2022-06-17 RX ORDER — PANTOPRAZOLE SODIUM 40 MG/1
40 TABLET, DELAYED RELEASE ORAL
Qty: 30 TABLET | Refills: 0 | Status: SHIPPED | OUTPATIENT
Start: 2022-06-18

## 2022-06-17 RX ORDER — CLOTRIMAZOLE AND BETAMETHASONE DIPROPIONATE 10; .64 MG/G; MG/G
CREAM TOPICAL
Qty: 15 G | Refills: 0 | Status: SHIPPED | OUTPATIENT
Start: 2022-06-17

## 2022-06-17 RX ORDER — POTASSIUM CHLORIDE 20 MEQ/1
40 TABLET, EXTENDED RELEASE ORAL DAILY
Qty: 30 TABLET | Refills: 0 | Status: SHIPPED | OUTPATIENT
Start: 2022-06-18 | End: 2022-07-03

## 2022-06-17 RX ORDER — TRAMADOL HYDROCHLORIDE 50 MG/1
50 TABLET ORAL
Qty: 5 TABLET | Refills: 0 | Status: SHIPPED | OUTPATIENT
Start: 2022-06-17 | End: 2022-06-20

## 2022-06-17 RX ORDER — PREDNISONE 20 MG/1
20 TABLET ORAL
Qty: 5 TABLET | Refills: 0 | Status: SHIPPED | OUTPATIENT
Start: 2022-06-18 | End: 2022-06-23

## 2022-06-17 RX ORDER — FUROSEMIDE 40 MG/1
40 TABLET ORAL 2 TIMES DAILY
Qty: 60 TABLET | Refills: 0 | Status: SHIPPED
Start: 2022-06-17 | End: 2022-07-17

## 2022-06-17 RX ORDER — DIPHENHYDRAMINE HCL 25 MG
25 CAPSULE ORAL
Qty: 12 CAPSULE | Refills: 0 | Status: SHIPPED | OUTPATIENT
Start: 2022-06-17 | End: 2022-06-27

## 2022-06-17 RX ADMIN — POTASSIUM CHLORIDE 40 MEQ: 20 TABLET, EXTENDED RELEASE ORAL at 08:23

## 2022-06-17 RX ADMIN — DIPHENHYDRAMINE HYDROCHLORIDE 25 MG: 25 CAPSULE ORAL at 17:53

## 2022-06-17 RX ADMIN — DIPHENHYDRAMINE HYDROCHLORIDE 25 MG: 25 CAPSULE ORAL at 12:30

## 2022-06-17 RX ADMIN — Medication: at 09:15

## 2022-06-17 RX ADMIN — Medication 2 UNITS: at 12:30

## 2022-06-17 RX ADMIN — FUROSEMIDE 40 MG: 40 TABLET ORAL at 08:23

## 2022-06-17 RX ADMIN — Medication 5 UNITS: at 17:54

## 2022-06-17 RX ADMIN — METOPROLOL TARTRATE 25 MG: 25 TABLET, FILM COATED ORAL at 08:23

## 2022-06-17 RX ADMIN — CLOTRIMAZOLE AND BETAMETHASONE DIPROPIONATE: 10; .5 CREAM TOPICAL at 09:00

## 2022-06-17 RX ADMIN — DIPHENHYDRAMINE HYDROCHLORIDE 25 MG: 25 CAPSULE ORAL at 05:31

## 2022-06-17 RX ADMIN — METOPROLOL TARTRATE 25 MG: 25 TABLET, FILM COATED ORAL at 17:54

## 2022-06-17 RX ADMIN — SODIUM CHLORIDE, PRESERVATIVE FREE 10 ML: 5 INJECTION INTRAVENOUS at 05:31

## 2022-06-17 RX ADMIN — FUROSEMIDE 40 MG: 40 TABLET ORAL at 16:28

## 2022-06-17 RX ADMIN — CLOTRIMAZOLE AND BETAMETHASONE DIPROPIONATE: 10; .5 CREAM TOPICAL at 16:06

## 2022-06-17 RX ADMIN — Medication 2 UNITS: at 08:23

## 2022-06-17 RX ADMIN — SODIUM CHLORIDE, PRESERVATIVE FREE 10 ML: 5 INJECTION INTRAVENOUS at 16:06

## 2022-06-17 RX ADMIN — PREDNISONE 40 MG: 20 TABLET ORAL at 08:23

## 2022-06-17 RX ADMIN — PANTOPRAZOLE SODIUM 40 MG: 40 TABLET, DELAYED RELEASE ORAL at 08:23

## 2022-06-17 RX ADMIN — IPRATROPIUM BROMIDE AND ALBUTEROL SULFATE 3 ML: .5; 3 SOLUTION RESPIRATORY (INHALATION) at 07:50

## 2022-06-17 RX ADMIN — RIVAROXABAN 20 MG: 20 TABLET, FILM COATED ORAL at 08:23

## 2022-06-17 RX ADMIN — SUCRALFATE 1 G: 1 TABLET ORAL at 08:23

## 2022-06-17 NOTE — PROGRESS NOTES
Report to Pat Nascimento with updates. No real changes with pt. Dr thinks pt's rash looks better. Pt extremely excoriated on sacrum. Heavy Zinc cream placed  Skin tear noted  After posterior  tip of pur wick.

## 2022-06-17 NOTE — PROGRESS NOTES
End of Shift Note    Bedside shift change report given to Gavino Quiroz RN (oncoming nurse) by Kade Alston (offgoing nurse). Report included the following information SBAR, Kardex, Intake/Output, MAR and Recent Results    Shift worked:  [id number]     Shift summary and any significant changes:     Nothing overnight     Concerns for physician to address:       Zone phone for oncoming shift:          Activity:  Activity Level: Bed Rest  Number times ambulated in hallways past shift: 0  Number of times OOB to chair past shift: 0    Cardiac:   Cardiac Monitoring: Yes      Cardiac Rhythm: Sinus Rhythm    Access:   Current line(s): PICC     Genitourinary:   Urinary status: external catheter    Respiratory:   O2 Device: BIPAP (with our resmed)  Chronic home O2 use?: YES  Incentive spirometer at bedside: NO       GI:  Last Bowel Movement Date: 06/10/22  Current diet:  ADULT DIET Dysphagia - Soft & Bite Sized  ADULT ORAL NUTRITION SUPPLEMENT Breakfast, Dinner, Lunch; Low Calorie/High Protein  Passing flatus: YES  Tolerating current diet: YES       Pain Management:   Patient states pain is manageable on current regimen: YES    Skin:  Gaston Score: 15  Interventions: float heels, increase time out of bed, foam dressing, PT/OT consult and internal/external urinary devices    Patient Safety:  Fall Score:  Total Score: 2  Interventions: bed/chair alarm, gripper socks, pt to call before getting OOB and stay with me (per policy)  High Fall Risk: Yes    Length of Stay:  Expected LOS: 3d 14h  Actual LOS: [facility]

## 2022-06-17 NOTE — PROGRESS NOTES
Spoke with representative from [facility] and informed her of patient's height and weight and she will provide bariatric. She states the transport time will remain the same. Amira Thao RN BSN CRM        [phone]

## 2022-06-17 NOTE — DISCHARGE SUMMARY
Hospitalist Discharge Summary     Patient ID:  Avelino Hendricks  [id number]  57 y.o.  1960 5/23/2022    PCP on record: Unknown, Provider, PA    Admit date: 5/23/2022  Discharge date and time: 6/17/2022    DISCHARGE DIAGNOSIS:    Acute on chronic hypoxic/hypercapnic respiratory failure-resolved  Left-sided pleural effusion thoracentesis (too small to tap)  JOSLYN/OHS on trilogy  COPD on O2  Acute on chronic anemia:  Left hip fracture displaced  age undetermined  Left hip osteomyelitis/septic arthritis   Bilateral buttock and sacral wounds    Maculopapular rash, suspected drug rash from Zosyn? Chronic diastolic heart failure:  PAF  HTN            CONSULTATIONS:  IP CONSULT TO PULMONOLOGY  IP CONSULT TO PULMONOLOGY  IP CONSULT TO GASTROENTEROLOGY  IP CONSULT TO INFECTIOUS DISEASES  IP CONSULT TO INFECTIOUS DISEASES    Excerpted HPI from H&P of Clifford Mcintosh MD:  Ms. Lida Cleaning is 56 yo morbidly obese CF with frequent admissions for respiratory failure, just discharged last wk after treatment of left buttock wound and also respiratory failure. Now sent from SNF after She was noted to be hypoxic by SNF staff this morning in the low 80s on her chronic 3 L nasal cannula, they bumped up to 5 L and noted no improvement so they called EMS.  EMS bumped up to 6 L without improvement in her hypoxia and started her on a nonrebreather. She started on bipap support in ED, her ABG showed severed hypercapnia and respiratory acidosis. She is going to be admitted to ICU for further care and monitoring.     ______________________________________________________________________  DISCHARGE SUMMARY/HOSPITAL COURSE:  for full details see H&P, daily progress notes, labs, consult notes.        Avelino Hendricks  is a 61/F with medical history significant for acute on chronic respiratory failure , atrial fibrillation chronic fracture left hip , currently being treated as osteomyelitis/septic arthritis of left hip based on MRI findings. Patient was  admitted on 05/23 for acute on chronic hypercapnic respiratory failure. Initially admitted to ICU and then transferred to step down on 05/24 after treating with BiPAP. Thereafter transferred back to ICU. Which she eventually improved and transferred to stepdown, she has been stable for past few days with her oxygen requirement. She needs trilogy at night. Acute on chronic hypoxic/hypercapnic respiratory failure-resolved  Left-sided pleural effusion thoracentesis (too small to tap)  JOSLYN/OHS on trilogy  COPD on O2  Was transferred out of ICU and went back d/t acute hypercapnic respiratory failure, improved with bipap, now on 3 liters, using Trilogy at night. Tapered off oral steroids  Continue trilogy device as per pulmonary.  CM working for [employer] at [facility]  Seen by pulmonary  On 3 liters of oxygen at baseline  Leukocytosis likely d/t steroids     Acute on chronic anemia:  Hb dropped gradually, received 1 unit on 6/2  Stool occult positive  Protonix 40 q12 hours  S/p EGD on 6/3, no active bleeding, biopsy sent, results not that remarkable   Resumed Xarelto, tolerating     Left hip fracture displaced  age undetermined  Left hip osteomyelitis/septic arthritis   Bilateral buttock and sacral wounds    -[facility] evaluated no surgical intervention planned  -S/p Aspiration?/Bx by IR, culture negative. -ID recommends daptomycin and cefepime on discharge last admit.  End date 6/16.    -Finished antibiotics  -DC PICC line before discharge     Maculopapular rash, suspected drug rash, improving  -Centrifugal rash, nonblanching, no mucosal involvement, no bulla or vesicle.  -Possibly secondary to Zosyn, DC'd  -DC'd vancomycin    -Her rash is clearing centrally, no bullous or no vesicles forming. No fever. Advised not to scratch.   -Appreciate IDs help                Metabolic alkalosis  Improved, has chronic alkalosis for compensation  Would avoid diamox, as intensivist thinks this caused her to go into respiratory failure second time     Chronic diastolic heart failure:  PAF  HTN   Continue amiodarone . Cont Lasix   Continue Xarelto     Steroid induced hyperglycemia-       Morbid obesity/Impaired mobility/functional quadriplegia-supportive care     Hypokalemia: Resolved      _______________________________________________________________________  Patient seen and examined by me on discharge day. Pertinent Findings:  Gen:    Not in distress  Chest: Clear lungs  CVS:   Regular rhythm. No edema  Abd:  Soft, not distended, not tender  Neuro:  Alert,   _______________________________________________________________________  DISCHARGE MEDICATIONS:   Current Discharge Medication List            Patient Follow Up Instructions:    Activity: Activity as tolerated  Diet: Cardiac Diet  Wound Care: As directed    Check BMP within 1 week    Follow-up Information     Follow up With Specialties Details Why Contact [place] [address]  [phone]        ________________________________________________________________    Risk of deterioration: Moderate    Condition at Discharge:  Stable  __________________________________________________________________    Disposition  [facility]/LTC    ____________________________________________________________________    Code Status: Full Code  ___________________________________________________________________      Total time in minutes spent coordinating this discharge (includes going over instructions, follow-up, prescriptions, and preparing report for sign off to her PCP) :  >30 minutes    Signed:  Theron Dubin, MD

## 2022-06-17 NOTE — PROGRESS NOTES
Transition of Care Plan: [facility] will transport patient today at 1700pm     RUR:  24% high risk for readmission    Disposition:  Return to SNF at [facility]    Follow up appointments:  To be arranged by SNF    DME needed:  Trilogy machine (being ordered by SNF, settings faxed on 5/31/2022) Spoke with lialoraine on 6.7.22 and she states they will be getting the trilogy machine from the daughter's home. Transportation at [address] or means to access home:  Family has access       IM Medicare Letter:  2nd IMM letter given 6/17/22    Is patient a BCPI-A Bundle: n/a                      If yes, was Bundle Letter given?:n/a      Is patient a  and connected with the [facility]? n/a                If yes, was [employer] transfer form completed and [facility] notified? Caregiver Contact: Sister Elsa Greenberg [phone]/ Daughter Rashad Hutson ([phone])    Discharge Caregiver contacted prior to discharge? Daughter informed by patient.     Care Conference needed?: not needed at this time. [facility]. Spoke with bharat and they have a bed for the patient this pm after 1600. She is returning to [facility]. Patient and daughter in agreement. PCS along with medicals given to nursing. Referral sent to [facility] and they will transport patient today at 1700pm. Informed patient, liason at [address] of transport time. Informed md and nursing patient will need rapid covid prior to discharge to [facility] today. Per [facility] at [facility] ([phone] have the trilogy machine in her office. Amira Thao  RN BSN CRM        [phone]    Transition of Care Plan to SNF/Rehab    SNF/Rehab Transition:  Patient has been accepted to return to [facility] and meets criteria for admission.    Patient will transported by [facility] and expected to leave at 1700pm    Communication to Patient/Family:  Met with patient and daughter and they are agreeable to the transition plan.    Communication to SNF/Rehab:  Bedside RNStephania has been notified to update the transition plan to the facility and call report to [phone] and ask for [facility]  Discharge information has been updated on the AVS.     Discharge instructions to be fax'd to facility at   Nursing Please include all hard scripts for controlled substances, med rec and dc summary, and AVS in packet. Reviewed and confirmed with facility, nursing , can manage the patient care needs for the following:     Media Cantu with (X) only those applicable:    Medication:  [x]  Medications will be available at the facility  []  IV Antibiotics   [x]  Controlled Substance - hard copy to be sent with patient   []  Weekly Labs   Documents:  [x] Hard RX  [x] MAR  [] Kardex  [x] AVS  []Transfer Summary  [x]Discharge   Equipment:  []  CPAP/BiPAP  []  Wound Vacuum  []  Petersen or Urinary Device  []  PICC/Central Line  []  Nebulizer  []  Ventilator   Treatment:  []Isolation (for MRSA, VRE, etc.)  []Surgical Drain Management  []Tracheostomy Care  []Dressing Changes  []Dialysis with transportation and chair time   []PEG Care  [x]Oxygen  []Daily Weights for Heart Failure   Dietary:  []Any diet limitations  []Tube Feedings   []Total Parenteral Management (TPN)   Eligible for Medicaid Long Term Services and Supports  Yes:  [] Eligible for medical assistance or will become eligible within 180 days and UAI completed. [] Provider/Patient and/or support system has requested screening. [] UAI copy provided to patient or responsible party, .  [] UAI unavailable at discharge will send once processed to SNF provider. [] UAI unavailable at discharged mailed to patient  No:   [] Private pay and is not financially eligible for Medicaid within the next 180 days. [] Reside out-of-state.   [] A residents of a state owned/operated facility that is licensed  by [place] [address] [facility] or [facility]  [] Enrollment in Medicaid hospice services  [] Non [place] citizen  [x] Patient /Family declines to have screening completed or provide financial information for screening     Financial Resources:  Medicaid    [] Initiated and application pending   [] Full coverage     Advanced Care Plan:  []Surrogate Decision Maker of Care  []POA  [x]Communicated Code Status -- full   Other    Per liason the patient's trilogy machine is at the facility.

## 2022-06-17 NOTE — PROGRESS NOTES
1800 Discharge paperwork reviewed with patient. Medication and follow up directions reviewed. No questions form patient at this time. Patient verbalized understanding of the information. Patient safely transported with [facility] to [facility]. Attempted to call report to receiving nurse 3 times. Left message on phone. No return call.

## 2022-06-17 NOTE — DISCHARGE INSTRUCTIONS
HOSPITALIST DISCHARGE INSTRUCTIONS    NAME: Jeffrey Hardwick   :  1960   MRN:  [id number]     Date/Time:  2022 11:08 AM    ADMIT DATE: 2022   DISCHARGE DATE: 2022     Attending Physician: [place] Perdomo MD    DISCHARGE DIAGNOSIS:    Acute on chronic hypoxic/hypercapnic respiratory failure-resolved  Left-sided pleural effusion thoracentesis (too small to tap)  JOSLYN/OHS on trilogy  COPD on O2  Acute on chronic anemia:  Left hip fracture displaced  age undetermined  Left hip osteomyelitis/septic arthritis   Bilateral buttock and sacral wounds    Maculopapular rash, suspected drug rash from Zosyn? Chronic diastolic heart failure:  PAF  HTN       Medications: Per above medication reconciliation. Pain Management: per above medications    Recommended diet: Resume previous diet    Recommended activity: Activity as tolerated    Wound care: None    Indwelling devices:  None    Supplemental Oxygen: Chronic Oxygen,  3  LNC at baseline    Required Lab work: Per SNF routine    Glucose management:  None    Code status: Full        Outside physician follow up: Follow-up Information     Follow up With Specialties Details Why Contact Info    [address] [place] [address]  [phone]               Skilled nursing facility/ SNF MD responsible for above on discharge. Information obtained by :  I understand that if any problems occur once I am at home I am to contact my physician. I understand and acknowledge receipt of the instructions indicated above.                                                                                                                                            Physician's or R.N.'s Signature                                                                  Date/Time Patient or Repres

## 2022-06-30 ENCOUNTER — DOCUMENTATION ONLY (OUTPATIENT)
Dept: INFECTIOUS DISEASES | Age: 62
End: 2022-06-30

## 2022-06-30 NOTE — PROGRESS NOTES
Spoke with Amrita Benavides ,patient's daughter [phone] and she would like to cancel appointment for today . Patient no longer has IV antibiotics and is currently in [facility].

## 2022-09-30 NOTE — PROGRESS NOTES
"[facility]                HPI   Here for follow up today after vulvar biopsy confirmed lichen sclerosis.  She has been using clobetasol ointment bid for 14 days with resolution of her sx.  Denies any pain at biopsy site.  Full teaching on diagnosis provided and questions answered.             Medications:     Current Outpatient Medications Ordered in Epic   Medication     atorvastatin (LIPITOR) 10 MG tablet     blood glucose (ACCU-CHEK SMARTVIEW) test strip     blood glucose calibration (ACCU-CHEK SMARTVIEW CONTROL) solution     blood glucose monitoring (ACCU-CHEK FASTCLIX) lancets     blood glucose monitoring (ACCU-CHEK DESTINI SMARTVIEW) meter device kit     clobetasol (TEMOVATE) 0.05 % external ointment     Lactobacillus-Inulin (CULTURELLE DIGESTIVE DAILY) CAPS     levothyroxine (SYNTHROID/LEVOTHROID) 100 MCG tablet     metFORMIN (GLUCOPHAGE XR) 500 MG 24 hr tablet     polyethylene glycol (MIRALAX) 17 GM/Dose powder     triamcinolone (KENALOG) 0.1 % ointment     vitamin D3 (CHOLECALCIFEROL) 1000 units (25 mcg) tablet     No current Epic-ordered facility-administered medications on file.                Allergies:   Hydrochlorothiazide, Atenolol, and Lisinopril         Review of Systems:   The 5 point Review of Systems is negative other than noted in the HPI                     Physical Exam:   Blood pressure 125/75, pulse 90, resp. rate 16, height 1.651 m (5' 5\"), weight 83.5 kg (184 lb), SpO2 98 %, not currently breastfeeding.  Constitutional:   awake, alert, cooperative, no apparent distress, and appears stated age     Genitounirinary:   External Genitalia:  Biopsy site healed.  Tissues thin and pale without inflammation or fissuring.               Data:   Pathology results reviewed.            Assessment and Plan:   Lichen sclerosis - full teaching provided on the new diagnosis and chronicity of disease.  She will start weaning down on the clobetasol to once daily for 5 days and then every other " P&T-Approved DVT Prophylaxis Dosing    Per P&T Committee-approved protocol enoxaparin 40 mg daily has been adjusted to enoxaparin 30 mg BID based on weight and renal function as shown in the table below.          HIRAL [place] day.  She understands that occasional flare ups are possible and in that case she should resume twice daily application for 14 days followed by the tapering again.  She is to follow up annually and as needed.      Madeleine Ramirez NP, NP  9/30/2022  8:29 AM

## 2022-12-30 NOTE — PROGRESS NOTES
Problem: Falls - Risk of  Goal: *Absence of Falls  Description: Document Veatrice Marker Fall Risk and appropriate interventions in the flowsheet.   Outcome: Progressing Towards Goal  Note: Fall Risk Interventions:       Mentation Interventions: Bed/chair exit alarm    Medication Interventions: Bed/chair exit alarm    Elimination Interventions: Bed/chair exit alarm    History of Falls Interventions: Bed/chair exit alarm no

## 2023-01-01 ENCOUNTER — HOSPITAL ENCOUNTER (INPATIENT)
Age: 63
LOS: 3 days | DRG: 951 | End: 2023-02-13
Attending: FAMILY MEDICINE | Admitting: FAMILY MEDICINE
Payer: OTHER MISCELLANEOUS

## 2023-01-01 ENCOUNTER — HOSPICE ADMISSION (OUTPATIENT)
Dept: HOSPICE | Facility: HOSPICE | Age: 63
End: 2023-01-01
Payer: MEDICARE

## 2023-01-01 VITALS
DIASTOLIC BLOOD PRESSURE: 68 MMHG | RESPIRATION RATE: 23 BRPM | BODY MASS INDEX: 43.22 KG/M2 | WEIGHT: 244 LBS | OXYGEN SATURATION: 90 % | TEMPERATURE: 97.3 F | SYSTOLIC BLOOD PRESSURE: 88 MMHG | HEART RATE: 74 BPM

## 2023-01-01 DIAGNOSIS — R06.02 SHORTNESS OF BREATH: ICD-10-CM

## 2023-01-01 DIAGNOSIS — F41.8 ANXIETY ABOUT HEALTH: ICD-10-CM

## 2023-01-01 DIAGNOSIS — J96.00 ACUTE RESPIRATORY FAILURE, UNSPECIFIED WHETHER WITH HYPOXIA OR HYPERCAPNIA (HCC): Primary | ICD-10-CM

## 2023-01-01 PROCEDURE — 0656 HSPC GENERAL INPATIENT

## 2023-01-01 PROCEDURE — 94660 CPAP INITIATION&MGMT: CPT

## 2023-01-01 PROCEDURE — 65270000015 HC RM PRIVATE ONCOLOGY

## 2023-01-01 PROCEDURE — 74011250636 HC RX REV CODE- 250/636: Performed by: NURSE PRACTITIONER

## 2023-01-01 PROCEDURE — 99233 SBSQ HOSP IP/OBS HIGH 50: CPT | Performed by: PHYSICAL MEDICINE & REHABILITATION

## 2023-01-01 PROCEDURE — 5A09457 ASSISTANCE WITH RESPIRATORY VENTILATION, 24-96 CONSECUTIVE HOURS, CONTINUOUS POSITIVE AIRWAY PRESSURE: ICD-10-PCS | Performed by: FAMILY MEDICINE

## 2023-01-01 PROCEDURE — 74011250636 HC RX REV CODE- 250/636: Performed by: PHYSICAL MEDICINE & REHABILITATION

## 2023-01-01 RX ORDER — HYDROMORPHONE HYDROCHLORIDE 1 MG/ML
1-2 INJECTION, SOLUTION INTRAMUSCULAR; INTRAVENOUS; SUBCUTANEOUS
Status: DISCONTINUED | OUTPATIENT
Start: 2023-01-01 | End: 2023-01-01

## 2023-01-01 RX ORDER — SODIUM CHLORIDE 0.9 % (FLUSH) 0.9 %
5 SYRINGE (ML) INJECTION AS NEEDED
Status: DISCONTINUED | OUTPATIENT
Start: 2023-01-01 | End: 2023-01-01 | Stop reason: HOSPADM

## 2023-01-01 RX ORDER — FACIAL-BODY WIPES
10 EACH TOPICAL DAILY PRN
Status: DISCONTINUED | OUTPATIENT
Start: 2023-01-01 | End: 2023-01-01 | Stop reason: HOSPADM

## 2023-01-01 RX ORDER — HYDROMORPHONE HYDROCHLORIDE 1 MG/ML
1 INJECTION, SOLUTION INTRAMUSCULAR; INTRAVENOUS; SUBCUTANEOUS EVERY 4 HOURS
Status: DISCONTINUED | OUTPATIENT
Start: 2023-01-01 | End: 2023-01-01 | Stop reason: HOSPADM

## 2023-01-01 RX ORDER — KETOROLAC TROMETHAMINE 30 MG/ML
30 INJECTION, SOLUTION INTRAMUSCULAR; INTRAVENOUS
Status: DISCONTINUED | OUTPATIENT
Start: 2023-01-01 | End: 2023-01-01 | Stop reason: HOSPADM

## 2023-01-01 RX ORDER — LORAZEPAM 2 MG/ML
1 INJECTION INTRAMUSCULAR EVERY 4 HOURS
Status: DISCONTINUED | OUTPATIENT
Start: 2023-01-01 | End: 2023-01-01 | Stop reason: HOSPADM

## 2023-01-01 RX ORDER — HYDROMORPHONE HYDROCHLORIDE 1 MG/ML
0.5 INJECTION, SOLUTION INTRAMUSCULAR; INTRAVENOUS; SUBCUTANEOUS
Status: DISCONTINUED | OUTPATIENT
Start: 2023-01-01 | End: 2023-01-01

## 2023-01-01 RX ORDER — GLYCOPYRROLATE 0.2 MG/ML
0.2 INJECTION INTRAMUSCULAR; INTRAVENOUS
Status: DISCONTINUED | OUTPATIENT
Start: 2023-01-01 | End: 2023-01-01 | Stop reason: HOSPADM

## 2023-01-01 RX ORDER — LORAZEPAM 2 MG/ML
2 INJECTION INTRAMUSCULAR
Status: DISCONTINUED | OUTPATIENT
Start: 2023-01-01 | End: 2023-01-01

## 2023-01-01 RX ORDER — LORAZEPAM 2 MG/ML
0.5 INJECTION INTRAMUSCULAR
Status: DISCONTINUED | OUTPATIENT
Start: 2023-01-01 | End: 2023-01-01

## 2023-01-01 RX ORDER — LORAZEPAM 2 MG/ML
1 INJECTION INTRAMUSCULAR
Status: DISCONTINUED | OUTPATIENT
Start: 2023-01-01 | End: 2023-01-01 | Stop reason: HOSPADM

## 2023-01-01 RX ORDER — KETOROLAC TROMETHAMINE 30 MG/ML
30 INJECTION, SOLUTION INTRAMUSCULAR; INTRAVENOUS
Status: ACTIVE | OUTPATIENT
Start: 2023-01-01 | End: 2023-01-01

## 2023-01-01 RX ORDER — HYDROMORPHONE HYDROCHLORIDE 1 MG/ML
1 INJECTION, SOLUTION INTRAMUSCULAR; INTRAVENOUS; SUBCUTANEOUS
Status: DISCONTINUED | OUTPATIENT
Start: 2023-01-01 | End: 2023-01-01 | Stop reason: HOSPADM

## 2023-01-01 RX ADMIN — LORAZEPAM 0.5 MG: 2 INJECTION INTRAMUSCULAR; INTRAVENOUS at 03:34

## 2023-01-01 RX ADMIN — LORAZEPAM 0.5 MG: 2 INJECTION INTRAMUSCULAR; INTRAVENOUS at 10:09

## 2023-01-01 RX ADMIN — LORAZEPAM 0.5 MG: 2 INJECTION INTRAMUSCULAR; INTRAVENOUS at 00:49

## 2023-01-01 RX ADMIN — HYDROMORPHONE HYDROCHLORIDE 0.5 MG: 1 INJECTION, SOLUTION INTRAMUSCULAR; INTRAVENOUS; SUBCUTANEOUS at 18:37

## 2023-01-01 RX ADMIN — HYDROMORPHONE HYDROCHLORIDE 0.5 MG: 1 INJECTION, SOLUTION INTRAMUSCULAR; INTRAVENOUS; SUBCUTANEOUS at 09:59

## 2023-01-01 RX ADMIN — LORAZEPAM 0.5 MG: 2 INJECTION INTRAMUSCULAR; INTRAVENOUS at 22:35

## 2023-01-01 RX ADMIN — HYDROMORPHONE HYDROCHLORIDE 1 MG: 1 INJECTION, SOLUTION INTRAMUSCULAR; INTRAVENOUS; SUBCUTANEOUS at 12:24

## 2023-01-01 RX ADMIN — HYDROMORPHONE HYDROCHLORIDE 0.5 MG: 1 INJECTION, SOLUTION INTRAMUSCULAR; INTRAVENOUS; SUBCUTANEOUS at 22:37

## 2023-01-01 RX ADMIN — HYDROMORPHONE HYDROCHLORIDE 0.5 MG: 1 INJECTION, SOLUTION INTRAMUSCULAR; INTRAVENOUS; SUBCUTANEOUS at 10:19

## 2023-01-01 RX ADMIN — HYDROMORPHONE HYDROCHLORIDE 0.5 MG: 1 INJECTION, SOLUTION INTRAMUSCULAR; INTRAVENOUS; SUBCUTANEOUS at 00:46

## 2023-01-01 RX ADMIN — LORAZEPAM 0.5 MG: 2 INJECTION INTRAMUSCULAR; INTRAVENOUS at 18:37

## 2023-01-01 RX ADMIN — HYDROMORPHONE HYDROCHLORIDE 1 MG: 1 INJECTION, SOLUTION INTRAMUSCULAR; INTRAVENOUS; SUBCUTANEOUS at 12:01

## 2023-01-01 RX ADMIN — LORAZEPAM 0.5 MG: 2 INJECTION INTRAMUSCULAR; INTRAVENOUS at 20:00

## 2023-01-01 RX ADMIN — HYDROMORPHONE HYDROCHLORIDE 0.5 MG: 1 INJECTION, SOLUTION INTRAMUSCULAR; INTRAVENOUS; SUBCUTANEOUS at 12:04

## 2023-01-01 RX ADMIN — HYDROMORPHONE HYDROCHLORIDE 0.5 MG: 1 INJECTION, SOLUTION INTRAMUSCULAR; INTRAVENOUS; SUBCUTANEOUS at 10:07

## 2023-01-01 RX ADMIN — HYDROMORPHONE HYDROCHLORIDE 0.5 MG: 1 INJECTION, SOLUTION INTRAMUSCULAR; INTRAVENOUS; SUBCUTANEOUS at 20:30

## 2023-01-01 RX ADMIN — LORAZEPAM 2 MG: 2 INJECTION INTRAMUSCULAR; INTRAVENOUS at 12:01

## 2023-01-01 RX ADMIN — HYDROMORPHONE HYDROCHLORIDE 0.5 MG: 1 INJECTION, SOLUTION INTRAMUSCULAR; INTRAVENOUS; SUBCUTANEOUS at 03:34

## 2023-01-01 RX ADMIN — HYDROMORPHONE HYDROCHLORIDE 0.5 MG: 1 INJECTION, SOLUTION INTRAMUSCULAR; INTRAVENOUS; SUBCUTANEOUS at 10:10

## 2023-01-01 RX ADMIN — HYDROMORPHONE HYDROCHLORIDE 0.5 MG: 1 INJECTION, SOLUTION INTRAMUSCULAR; INTRAVENOUS; SUBCUTANEOUS at 20:45

## 2023-01-21 ENCOUNTER — APPOINTMENT (OUTPATIENT)
Dept: GENERAL RADIOLOGY | Age: 63
End: 2023-01-21
Attending: STUDENT IN AN ORGANIZED HEALTH CARE EDUCATION/TRAINING PROGRAM
Payer: MEDICARE

## 2023-01-21 ENCOUNTER — HOSPITAL ENCOUNTER (INPATIENT)
Age: 63
LOS: 19 days | Discharge: HOSPICE | End: 2023-02-10
Attending: STUDENT IN AN ORGANIZED HEALTH CARE EDUCATION/TRAINING PROGRAM | Admitting: STUDENT IN AN ORGANIZED HEALTH CARE EDUCATION/TRAINING PROGRAM
Payer: MEDICARE

## 2023-01-21 DIAGNOSIS — R78.81 PERSISTENT BACTEREMIA: ICD-10-CM

## 2023-01-21 DIAGNOSIS — M25.552 LEFT HIP PAIN: ICD-10-CM

## 2023-01-21 DIAGNOSIS — D64.9 SYMPTOMATIC ANEMIA: ICD-10-CM

## 2023-01-21 DIAGNOSIS — I10 ESSENTIAL HYPERTENSION: ICD-10-CM

## 2023-01-21 DIAGNOSIS — B95.61 MSSA BACTEREMIA: ICD-10-CM

## 2023-01-21 DIAGNOSIS — M25.452 EFFUSION OF LEFT HIP: ICD-10-CM

## 2023-01-21 DIAGNOSIS — M86.9: ICD-10-CM

## 2023-01-21 DIAGNOSIS — G47.33 OSA (OBSTRUCTIVE SLEEP APNEA): ICD-10-CM

## 2023-01-21 DIAGNOSIS — D50.9 IRON DEFICIENCY ANEMIA, UNSPECIFIED IRON DEFICIENCY ANEMIA TYPE: Primary | ICD-10-CM

## 2023-01-21 DIAGNOSIS — R78.81 MSSA BACTEREMIA: ICD-10-CM

## 2023-01-21 DIAGNOSIS — J96.21 ACUTE ON CHRONIC RESPIRATORY FAILURE WITH HYPOXIA AND HYPERCAPNIA (HCC): ICD-10-CM

## 2023-01-21 DIAGNOSIS — E66.01 MORBID OBESITY (HCC): ICD-10-CM

## 2023-01-21 DIAGNOSIS — I47.20 V-TACH: ICD-10-CM

## 2023-01-21 DIAGNOSIS — Z51.5 PALLIATIVE CARE BY SPECIALIST: ICD-10-CM

## 2023-01-21 DIAGNOSIS — R21 MACULAR RASH: ICD-10-CM

## 2023-01-21 DIAGNOSIS — J96.01 ACUTE RESPIRATORY FAILURE WITH HYPOXIA (HCC): ICD-10-CM

## 2023-01-21 DIAGNOSIS — J44.1 COPD EXACERBATION (HCC): ICD-10-CM

## 2023-01-21 DIAGNOSIS — Z71.89 ACP (ADVANCE CARE PLANNING): ICD-10-CM

## 2023-01-21 DIAGNOSIS — J96.22 ACUTE ON CHRONIC RESPIRATORY FAILURE WITH HYPOXIA AND HYPERCAPNIA (HCC): ICD-10-CM

## 2023-01-21 DIAGNOSIS — R06.02 SOB (SHORTNESS OF BREATH): ICD-10-CM

## 2023-01-21 LAB
ALBUMIN SERPL-MCNC: 2 G/DL (ref 3.5–5)
ALBUMIN/GLOB SERPL: 0.4 (ref 1.1–2.2)
ALP SERPL-CCNC: 97 U/L (ref 45–117)
ALT SERPL-CCNC: 27 U/L (ref 12–78)
ANION GAP SERPL CALC-SCNC: 3 MMOL/L (ref 5–15)
AST SERPL-CCNC: 52 U/L (ref 15–37)
BASOPHILS # BLD: 0 K/UL (ref 0–0.1)
BASOPHILS NFR BLD: 0 % (ref 0–1)
BILIRUB SERPL-MCNC: 0.3 MG/DL (ref 0.2–1)
BUN SERPL-MCNC: 17 MG/DL (ref 6–20)
BUN/CREAT SERPL: 19 (ref 12–20)
CALCIUM SERPL-MCNC: 8.3 MG/DL (ref 8.5–10.1)
CHLORIDE SERPL-SCNC: 95 MMOL/L (ref 97–108)
CO2 SERPL-SCNC: 42 MMOL/L (ref 21–32)
CREAT SERPL-MCNC: 0.89 MG/DL (ref 0.55–1.02)
DIFFERENTIAL METHOD BLD: ABNORMAL
EOSINOPHIL # BLD: 0.1 K/UL (ref 0–0.4)
EOSINOPHIL NFR BLD: 1 % (ref 0–7)
ERYTHROCYTE [DISTWIDTH] IN BLOOD BY AUTOMATED COUNT: 18.4 % (ref 11.5–14.5)
GLOBULIN SER CALC-MCNC: 5.2 G/DL (ref 2–4)
GLUCOSE SERPL-MCNC: 152 MG/DL (ref 65–100)
HCT VFR BLD AUTO: 19.8 % (ref 35–47)
HEMOCCULT STL QL: NEGATIVE
HGB BLD-MCNC: 5.3 G/DL (ref 11.5–16)
HISTORY CHECKED?,CKHIST: NORMAL
IMM GRANULOCYTES # BLD AUTO: 0.1 K/UL (ref 0–0.04)
IMM GRANULOCYTES NFR BLD AUTO: 1 % (ref 0–0.5)
LACTATE BLD-SCNC: 3.31 MMOL/L (ref 0.4–2)
LACTATE BLD-SCNC: 3.86 MMOL/L (ref 0.4–2)
LYMPHOCYTES # BLD: 2.1 K/UL (ref 0.8–3.5)
LYMPHOCYTES NFR BLD: 23 % (ref 12–49)
MCH RBC QN AUTO: 26.8 PG (ref 26–34)
MCHC RBC AUTO-ENTMCNC: 26.8 G/DL (ref 30–36.5)
MCV RBC AUTO: 100 FL (ref 80–99)
MONOCYTES # BLD: 0.4 K/UL (ref 0–1)
MONOCYTES NFR BLD: 4 % (ref 5–13)
NEUTS SEG # BLD: 6.6 K/UL (ref 1.8–8)
NEUTS SEG NFR BLD: 71 % (ref 32–75)
NRBC # BLD: 0.06 K/UL (ref 0–0.01)
NRBC BLD-RTO: 0.6 PER 100 WBC
PLATELET # BLD AUTO: 346 K/UL (ref 150–400)
PLATELET COMMENTS,PCOM: ABNORMAL
PMV BLD AUTO: 11.2 FL (ref 8.9–12.9)
POTASSIUM SERPL-SCNC: 4.4 MMOL/L (ref 3.5–5.1)
PROT SERPL-MCNC: 7.2 G/DL (ref 6.4–8.2)
RBC # BLD AUTO: 1.98 M/UL (ref 3.8–5.2)
RBC MORPH BLD: ABNORMAL
SODIUM SERPL-SCNC: 140 MMOL/L (ref 136–145)
WBC # BLD AUTO: 9.3 K/UL (ref 3.6–11)

## 2023-01-21 PROCEDURE — P9016 RBC LEUKOCYTES REDUCED: HCPCS

## 2023-01-21 PROCEDURE — 80053 COMPREHEN METABOLIC PANEL: CPT

## 2023-01-21 PROCEDURE — 96361 HYDRATE IV INFUSION ADD-ON: CPT

## 2023-01-21 PROCEDURE — 30233N1 TRANSFUSION OF NONAUTOLOGOUS RED BLOOD CELLS INTO PERIPHERAL VEIN, PERCUTANEOUS APPROACH: ICD-10-PCS | Performed by: STUDENT IN AN ORGANIZED HEALTH CARE EDUCATION/TRAINING PROGRAM

## 2023-01-21 PROCEDURE — 86923 COMPATIBILITY TEST ELECTRIC: CPT

## 2023-01-21 PROCEDURE — 87077 CULTURE AEROBIC IDENTIFY: CPT

## 2023-01-21 PROCEDURE — 36430 TRANSFUSION BLD/BLD COMPNT: CPT

## 2023-01-21 PROCEDURE — 77010033678 HC OXYGEN DAILY

## 2023-01-21 PROCEDURE — 96360 HYDRATION IV INFUSION INIT: CPT

## 2023-01-21 PROCEDURE — 36415 COLL VENOUS BLD VENIPUNCTURE: CPT

## 2023-01-21 PROCEDURE — 99285 EMERGENCY DEPT VISIT HI MDM: CPT

## 2023-01-21 PROCEDURE — 85025 COMPLETE CBC W/AUTO DIFF WBC: CPT

## 2023-01-21 PROCEDURE — 83605 ASSAY OF LACTIC ACID: CPT

## 2023-01-21 PROCEDURE — 82272 OCCULT BLD FECES 1-3 TESTS: CPT

## 2023-01-21 PROCEDURE — 71045 X-RAY EXAM CHEST 1 VIEW: CPT

## 2023-01-21 PROCEDURE — 86900 BLOOD TYPING SEROLOGIC ABO: CPT

## 2023-01-21 PROCEDURE — 87040 BLOOD CULTURE FOR BACTERIA: CPT

## 2023-01-21 PROCEDURE — 74011250636 HC RX REV CODE- 250/636: Performed by: STUDENT IN AN ORGANIZED HEALTH CARE EDUCATION/TRAINING PROGRAM

## 2023-01-21 PROCEDURE — 87150 DNA/RNA AMPLIFIED PROBE: CPT

## 2023-01-21 PROCEDURE — 87186 SC STD MICRODIL/AGAR DIL: CPT

## 2023-01-21 RX ORDER — SODIUM CHLORIDE 9 MG/ML
250 INJECTION, SOLUTION INTRAVENOUS AS NEEDED
Status: DISCONTINUED | OUTPATIENT
Start: 2023-01-21 | End: 2023-01-23

## 2023-01-21 RX ADMIN — SODIUM CHLORIDE 250 ML: 9 INJECTION, SOLUTION INTRAVENOUS at 21:00

## 2023-01-22 PROBLEM — D64.9 SYMPTOMATIC ANEMIA: Status: ACTIVE | Noted: 2023-01-22

## 2023-01-22 LAB
ACC. NO. FROM MICRO ORDER, ACCP: ABNORMAL
ACINETOBACTER CALCOACETICUS-BAUMANII COMPLEX, ACBCX: NOT DETECTED
AMORPH CRY URNS QL MICRO: ABNORMAL
ANION GAP SERPL CALC-SCNC: 1 MMOL/L (ref 5–15)
APPEARANCE UR: ABNORMAL
BACTERIA URNS QL MICRO: ABNORMAL /HPF
BACTEROIDES FRAGILIS, BFRA: NOT DETECTED
BASOPHILS # BLD: 0 K/UL (ref 0–0.1)
BASOPHILS NFR BLD: 0 % (ref 0–1)
BILIRUB UR QL: NEGATIVE
BIOFIRE COMMENT, BCIDPF: ABNORMAL
BNP SERPL-MCNC: 288 PG/ML
BUN SERPL-MCNC: 17 MG/DL (ref 6–20)
BUN/CREAT SERPL: 20 (ref 12–20)
C GLABRATA DNA VAG QL NAA+PROBE: NOT DETECTED
CALCIUM SERPL-MCNC: 7.9 MG/DL (ref 8.5–10.1)
CANDIDA ALBICANS: NOT DETECTED
CANDIDA AURIS, CAAU: NOT DETECTED
CANDIDA KRUSEI, CKRP: NOT DETECTED
CANDIDA PARAPSILOSIS, CPAUP: NOT DETECTED
CANDIDA TROPICALIS, CTROP: NOT DETECTED
CHLORIDE SERPL-SCNC: 96 MMOL/L (ref 97–108)
CO2 SERPL-SCNC: 42 MMOL/L (ref 21–32)
COLOR UR: ABNORMAL
COMMENT, HOLDF: NORMAL
CREAT SERPL-MCNC: 0.87 MG/DL (ref 0.55–1.02)
CRYPTO NEOFORMANS/GATTII, CRYNEG: NOT DETECTED
DIFFERENTIAL METHOD BLD: ABNORMAL
ENTEROBACTER CLOACAE COMPLEX, ECCP: NOT DETECTED
ENTEROBACTERALES SP. , ENBLS: NOT DETECTED
ENTEROCOCCUS FAECALIS, ENFA: NOT DETECTED
ENTEROCOCCUS FAECIUM, ENFAM: NOT DETECTED
EOSINOPHIL # BLD: 0 K/UL (ref 0–0.4)
EOSINOPHIL NFR BLD: 0 % (ref 0–7)
EPITH CASTS URNS QL MICRO: ABNORMAL /LPF
ERYTHROCYTE [DISTWIDTH] IN BLOOD BY AUTOMATED COUNT: 17.5 % (ref 11.5–14.5)
ESCHERICHIA COLI: NOT DETECTED
EST. AVERAGE GLUCOSE BLD GHB EST-MCNC: ABNORMAL MG/DL
GLUCOSE BLD STRIP.AUTO-MCNC: 116 MG/DL (ref 65–117)
GLUCOSE BLD STRIP.AUTO-MCNC: 118 MG/DL (ref 65–117)
GLUCOSE BLD STRIP.AUTO-MCNC: 118 MG/DL (ref 65–117)
GLUCOSE BLD STRIP.AUTO-MCNC: 129 MG/DL (ref 65–117)
GLUCOSE SERPL-MCNC: 129 MG/DL (ref 65–100)
GLUCOSE UR STRIP.AUTO-MCNC: NEGATIVE MG/DL
HAEMOPHILUS INFLUENZAE, HMI: NOT DETECTED
HBA1C MFR BLD: <3.8 % (ref 4–5.6)
HCT VFR BLD AUTO: 20.7 % (ref 35–47)
HCT VFR BLD AUTO: 23.8 % (ref 35–47)
HCT VFR BLD AUTO: 28.2 % (ref 35–47)
HGB BLD-MCNC: 5.8 G/DL (ref 11.5–16)
HGB BLD-MCNC: 7 G/DL (ref 11.5–16)
HGB BLD-MCNC: 8.1 G/DL (ref 11.5–16)
HGB UR QL STRIP: NEGATIVE
HISTORY CHECKED?,CKHIST: NORMAL
HISTORY CHECKED?,CKHIST: NORMAL
IMM GRANULOCYTES # BLD AUTO: 0.1 K/UL (ref 0–0.04)
IMM GRANULOCYTES NFR BLD AUTO: 1 % (ref 0–0.5)
KETONES UR QL STRIP.AUTO: NEGATIVE MG/DL
KLEBSIELLA AEROGENES, KLAE: NOT DETECTED
KLEBSIELLA OXYTOCA: NOT DETECTED
KLEBSIELLA PNEUMONIAE GROUP, KPPG: NOT DETECTED
LACTATE SERPL-SCNC: 1.9 MMOL/L (ref 0.4–2)
LACTATE SERPL-SCNC: 2.4 MMOL/L (ref 0.4–2)
LEUKOCYTE ESTERASE UR QL STRIP.AUTO: ABNORMAL
LISTERIA MONOCYTOGENES, LMONP: NOT DETECTED
LYMPHOCYTES # BLD: 2.1 K/UL (ref 0.8–3.5)
LYMPHOCYTES NFR BLD: 20 % (ref 12–49)
MCH RBC QN AUTO: 27.1 PG (ref 26–34)
MCHC RBC AUTO-ENTMCNC: 28 G/DL (ref 30–36.5)
MCV RBC AUTO: 96.7 FL (ref 80–99)
MECA/C AND MREJ (METHICILLIN RESISTANT GENE), MECAC: NOT DETECTED
MONOCYTES # BLD: 0.7 K/UL (ref 0–1)
MONOCYTES NFR BLD: 7 % (ref 5–13)
NEISSERIA MENINGITIDIS, NMNI: NOT DETECTED
NEUTS SEG # BLD: 7.5 K/UL (ref 1.8–8)
NEUTS SEG NFR BLD: 72 % (ref 32–75)
NITRITE UR QL STRIP.AUTO: POSITIVE
NRBC # BLD: 0.04 K/UL (ref 0–0.01)
NRBC BLD-RTO: 0.4 PER 100 WBC
PH UR STRIP: 5.5 (ref 5–8)
PLATELET # BLD AUTO: 274 K/UL (ref 150–400)
PMV BLD AUTO: 10.9 FL (ref 8.9–12.9)
POTASSIUM SERPL-SCNC: 4.6 MMOL/L (ref 3.5–5.1)
PROT UR STRIP-MCNC: ABNORMAL MG/DL
PROTEUS, PRP: NOT DETECTED
PSEUDOMONAS AERUGINOSA: NOT DETECTED
RBC # BLD AUTO: 2.14 M/UL (ref 3.8–5.2)
RBC #/AREA URNS HPF: ABNORMAL /HPF (ref 0–5)
RBC MORPH BLD: ABNORMAL
RESISTANT GENE SPACE, REGENE: ABNORMAL
SALMONELLA, SALMO: NOT DETECTED
SAMPLES BEING HELD,HOLD: NORMAL
SERRATIA MARCESCENS: NOT DETECTED
SERVICE CMNT-IMP: ABNORMAL
SERVICE CMNT-IMP: NORMAL
SODIUM SERPL-SCNC: 139 MMOL/L (ref 136–145)
SP GR UR REFRACTOMETRY: 1.03
STAPH EPIDERMIDIS, STEP: NOT DETECTED
STAPH LUGDUNENSIS, STALUG: NOT DETECTED
STAPHYLOCOCCUS AUREUS: DETECTED
STAPHYLOCOCCUS, STAPP: DETECTED
STENO MALTOPHILIA, STMA: NOT DETECTED
STREPTOCOCCUS , STPSP: NOT DETECTED
STREPTOCOCCUS AGALACTIAE (GROUP B): NOT DETECTED
STREPTOCOCCUS PNEUMONIAE , SPNP: NOT DETECTED
STREPTOCOCCUS PYOGENES (GROUP A), SPYOP: NOT DETECTED
UA: UC IF INDICATED,UAUC: ABNORMAL
UROBILINOGEN UR QL STRIP.AUTO: 1 EU/DL (ref 0.2–1)
WBC # BLD AUTO: 10.4 K/UL (ref 3.6–11)
WBC URNS QL MICRO: ABNORMAL /HPF (ref 0–4)

## 2023-01-22 PROCEDURE — 0HDRXZZ EXTRACTION OF TOE NAIL, EXTERNAL APPROACH: ICD-10-PCS | Performed by: PODIATRIST

## 2023-01-22 PROCEDURE — P9016 RBC LEUKOCYTES REDUCED: HCPCS

## 2023-01-22 PROCEDURE — 81001 URINALYSIS AUTO W/SCOPE: CPT

## 2023-01-22 PROCEDURE — 83605 ASSAY OF LACTIC ACID: CPT

## 2023-01-22 PROCEDURE — 94664 DEMO&/EVAL PT USE INHALER: CPT

## 2023-01-22 PROCEDURE — 83036 HEMOGLOBIN GLYCOSYLATED A1C: CPT

## 2023-01-22 PROCEDURE — 77010033678 HC OXYGEN DAILY

## 2023-01-22 PROCEDURE — 85025 COMPLETE CBC W/AUTO DIFF WBC: CPT

## 2023-01-22 PROCEDURE — 85018 HEMOGLOBIN: CPT

## 2023-01-22 PROCEDURE — 94640 AIRWAY INHALATION TREATMENT: CPT

## 2023-01-22 PROCEDURE — 5A09357 ASSISTANCE WITH RESPIRATORY VENTILATION, LESS THAN 24 CONSECUTIVE HOURS, CONTINUOUS POSITIVE AIRWAY PRESSURE: ICD-10-PCS | Performed by: STUDENT IN AN ORGANIZED HEALTH CARE EDUCATION/TRAINING PROGRAM

## 2023-01-22 PROCEDURE — 82962 GLUCOSE BLOOD TEST: CPT

## 2023-01-22 PROCEDURE — 74011250637 HC RX REV CODE- 250/637: Performed by: STUDENT IN AN ORGANIZED HEALTH CARE EDUCATION/TRAINING PROGRAM

## 2023-01-22 PROCEDURE — 80048 BASIC METABOLIC PNL TOTAL CA: CPT

## 2023-01-22 PROCEDURE — 36430 TRANSFUSION BLD/BLD COMPNT: CPT

## 2023-01-22 PROCEDURE — 83880 ASSAY OF NATRIURETIC PEPTIDE: CPT

## 2023-01-22 PROCEDURE — 36415 COLL VENOUS BLD VENIPUNCTURE: CPT

## 2023-01-22 PROCEDURE — 65270000046 HC RM TELEMETRY

## 2023-01-22 PROCEDURE — 51798 US URINE CAPACITY MEASURE: CPT

## 2023-01-22 PROCEDURE — 74011000250 HC RX REV CODE- 250: Performed by: STUDENT IN AN ORGANIZED HEALTH CARE EDUCATION/TRAINING PROGRAM

## 2023-01-22 PROCEDURE — 94660 CPAP INITIATION&MGMT: CPT

## 2023-01-22 PROCEDURE — 74011250636 HC RX REV CODE- 250/636: Performed by: STUDENT IN AN ORGANIZED HEALTH CARE EDUCATION/TRAINING PROGRAM

## 2023-01-22 RX ORDER — BUMETANIDE 0.25 MG/ML
1 INJECTION INTRAMUSCULAR; INTRAVENOUS ONCE
Status: COMPLETED | OUTPATIENT
Start: 2023-01-22 | End: 2023-01-22

## 2023-01-22 RX ORDER — SODIUM CHLORIDE 0.9 % (FLUSH) 0.9 %
5-40 SYRINGE (ML) INJECTION EVERY 8 HOURS
Status: DISCONTINUED | OUTPATIENT
Start: 2023-01-22 | End: 2023-02-10 | Stop reason: HOSPADM

## 2023-01-22 RX ORDER — ACETAMINOPHEN 325 MG/1
650 TABLET ORAL
Status: DISCONTINUED | OUTPATIENT
Start: 2023-01-22 | End: 2023-02-10 | Stop reason: HOSPADM

## 2023-01-22 RX ORDER — ONDANSETRON 4 MG/1
4 TABLET, ORALLY DISINTEGRATING ORAL
Status: DISCONTINUED | OUTPATIENT
Start: 2023-01-22 | End: 2023-02-10 | Stop reason: HOSPADM

## 2023-01-22 RX ORDER — INSULIN LISPRO 100 [IU]/ML
INJECTION, SOLUTION INTRAVENOUS; SUBCUTANEOUS
Status: DISCONTINUED | OUTPATIENT
Start: 2023-01-22 | End: 2023-01-25

## 2023-01-22 RX ORDER — ACETAMINOPHEN 650 MG/1
650 SUPPOSITORY RECTAL
Status: DISCONTINUED | OUTPATIENT
Start: 2023-01-22 | End: 2023-02-10 | Stop reason: HOSPADM

## 2023-01-22 RX ORDER — AMIODARONE HYDROCHLORIDE 200 MG/1
TABLET ORAL
COMMUNITY
End: 2023-02-13

## 2023-01-22 RX ORDER — ATORVASTATIN CALCIUM 10 MG/1
10 TABLET, FILM COATED ORAL
Status: DISCONTINUED | OUTPATIENT
Start: 2023-01-22 | End: 2023-02-10 | Stop reason: HOSPADM

## 2023-01-22 RX ORDER — POLYETHYLENE GLYCOL 3350 17 G/17G
17 POWDER, FOR SOLUTION ORAL DAILY PRN
Status: DISCONTINUED | OUTPATIENT
Start: 2023-01-22 | End: 2023-02-10 | Stop reason: HOSPADM

## 2023-01-22 RX ORDER — DEXTROSE MONOHYDRATE 100 MG/ML
0-250 INJECTION, SOLUTION INTRAVENOUS AS NEEDED
Status: DISCONTINUED | OUTPATIENT
Start: 2023-01-22 | End: 2023-02-10 | Stop reason: HOSPADM

## 2023-01-22 RX ORDER — PANTOPRAZOLE SODIUM 40 MG/1
40 TABLET, DELAYED RELEASE ORAL
Status: DISCONTINUED | OUTPATIENT
Start: 2023-01-22 | End: 2023-02-10 | Stop reason: HOSPADM

## 2023-01-22 RX ORDER — IPRATROPIUM BROMIDE AND ALBUTEROL SULFATE 2.5; .5 MG/3ML; MG/3ML
3 SOLUTION RESPIRATORY (INHALATION)
Status: DISCONTINUED | OUTPATIENT
Start: 2023-01-22 | End: 2023-02-01

## 2023-01-22 RX ORDER — SODIUM CHLORIDE 9 MG/ML
250 INJECTION, SOLUTION INTRAVENOUS AS NEEDED
Status: DISCONTINUED | OUTPATIENT
Start: 2023-01-22 | End: 2023-01-24 | Stop reason: SDUPTHER

## 2023-01-22 RX ORDER — BALSAM PERU/CASTOR OIL
OINTMENT (GRAM) TOPICAL 2 TIMES DAILY
Status: DISCONTINUED | OUTPATIENT
Start: 2023-01-22 | End: 2023-02-10 | Stop reason: HOSPADM

## 2023-01-22 RX ORDER — METOPROLOL TARTRATE 25 MG/1
25 TABLET, FILM COATED ORAL 2 TIMES DAILY
Status: DISCONTINUED | OUTPATIENT
Start: 2023-01-22 | End: 2023-01-25

## 2023-01-22 RX ORDER — IBUPROFEN 200 MG
4 TABLET ORAL AS NEEDED
Status: DISCONTINUED | OUTPATIENT
Start: 2023-01-22 | End: 2023-01-25

## 2023-01-22 RX ORDER — IPRATROPIUM BROMIDE AND ALBUTEROL SULFATE 2.5; .5 MG/3ML; MG/3ML
3 SOLUTION RESPIRATORY (INHALATION)
Status: DISCONTINUED | OUTPATIENT
Start: 2023-01-22 | End: 2023-01-24

## 2023-01-22 RX ORDER — SODIUM CHLORIDE 9 MG/ML
250 INJECTION, SOLUTION INTRAVENOUS AS NEEDED
Status: DISCONTINUED | OUTPATIENT
Start: 2023-01-22 | End: 2023-01-23

## 2023-01-22 RX ORDER — ONDANSETRON 2 MG/ML
4 INJECTION INTRAMUSCULAR; INTRAVENOUS
Status: DISCONTINUED | OUTPATIENT
Start: 2023-01-22 | End: 2023-02-10 | Stop reason: HOSPADM

## 2023-01-22 RX ORDER — SODIUM CHLORIDE 0.9 % (FLUSH) 0.9 %
5-40 SYRINGE (ML) INJECTION AS NEEDED
Status: DISCONTINUED | OUTPATIENT
Start: 2023-01-22 | End: 2023-02-10 | Stop reason: HOSPADM

## 2023-01-22 RX ORDER — FUROSEMIDE 20 MG/1
TABLET ORAL DAILY
COMMUNITY
End: 2023-02-13

## 2023-01-22 RX ADMIN — IPRATROPIUM BROMIDE AND ALBUTEROL SULFATE 3 ML: 2.5; .5 SOLUTION RESPIRATORY (INHALATION) at 20:21

## 2023-01-22 RX ADMIN — PANTOPRAZOLE SODIUM 40 MG: 40 TABLET, DELAYED RELEASE ORAL at 16:45

## 2023-01-22 RX ADMIN — IPRATROPIUM BROMIDE AND ALBUTEROL SULFATE 3 ML: 2.5; .5 SOLUTION RESPIRATORY (INHALATION) at 15:09

## 2023-01-22 RX ADMIN — CASTOR OIL AND BALSAM, PERU: 788; 87 OINTMENT TOPICAL at 22:37

## 2023-01-22 RX ADMIN — SODIUM CHLORIDE, PRESERVATIVE FREE 10 ML: 5 INJECTION INTRAVENOUS at 22:33

## 2023-01-22 RX ADMIN — SODIUM CHLORIDE, PRESERVATIVE FREE 10 ML: 5 INJECTION INTRAVENOUS at 14:00

## 2023-01-22 RX ADMIN — SODIUM CHLORIDE, PRESERVATIVE FREE 10 ML: 5 INJECTION INTRAVENOUS at 07:36

## 2023-01-22 RX ADMIN — IPRATROPIUM BROMIDE AND ALBUTEROL SULFATE 3 ML: 2.5; .5 SOLUTION RESPIRATORY (INHALATION) at 09:06

## 2023-01-22 RX ADMIN — PANTOPRAZOLE SODIUM 40 MG: 40 TABLET, DELAYED RELEASE ORAL at 07:30

## 2023-01-22 RX ADMIN — IPRATROPIUM BROMIDE AND ALBUTEROL SULFATE 3 ML: 2.5; .5 SOLUTION RESPIRATORY (INHALATION) at 01:28

## 2023-01-22 RX ADMIN — ONDANSETRON 4 MG: 4 TABLET, ORALLY DISINTEGRATING ORAL at 16:45

## 2023-01-22 RX ADMIN — IPRATROPIUM BROMIDE AND ALBUTEROL SULFATE 3 ML: 2.5; .5 SOLUTION RESPIRATORY (INHALATION) at 11:10

## 2023-01-22 RX ADMIN — SODIUM CHLORIDE, PRESERVATIVE FREE 10 ML: 5 INJECTION INTRAVENOUS at 00:43

## 2023-01-22 RX ADMIN — BUMETANIDE 1 MG: 0.25 INJECTION, SOLUTION INTRAMUSCULAR; INTRAVENOUS at 05:41

## 2023-01-22 RX ADMIN — ATORVASTATIN CALCIUM 10 MG: 10 TABLET, FILM COATED ORAL at 22:35

## 2023-01-22 NOTE — CONSULTS
GASTROENTEROLOGY CONSULTATION NOTE                            MICHELET Craft MD  [facility], [address], [address]  [address]  [phone]  [email or web address]        NAME:  Shira Dc   :   1960   MRN:   [id number]   PCP: Unknown, Provider  Date/Time:  2023 1:16 PM    Referring Physician: Hood Brownlee NP    Primary Gastroenterologist; Cassandra Garduno MD    Consult Date: 2023 1:16 PM    Reason for consult: anemia                   Assessment:   Acute anemia---but no gross gi bleeding. Prior iron studie in  were normal as was B12 and folate  Stool negative for occult blood    Hx of PUD on EGD 2022  Morbid obesity       Active Problems:    Symptomatic anemia (2023)        Plan:   Serial H and H  Has received 1 unit pRBC  Possible EGD Monday by Dr Chevy Brewer   Consider hematology evaluation         History of Present Illness:  Patient is a 60 y.o. who is seen in consultation at the request of MOISE Vidal for anemia. Checo Chakraborty is a morbidly obese 60year old woman with past medical history of chronic hypoxemic respiratory failure on 4 L/min home oxygen therapy secondary to COPD, diabetes mellitus, essential hypertension, hyperlipidemia, JOSLYN compliant with CPAP, history duodenal ulcers, chronic anemia of indeterminate etiology who presents after routine lab work performed yesterday demonstrated hemoglobin of 5.3. Stool tested at [facility] negative for occult blood. She denies passage of either melena or BRBPR. No abdominal pain. Thinks she might be on a blood thinner but unclear which one    Prior EGD .6/3/2022 by Dr Cassandra Garduno  Findings:      Esophagus: The esophageal mucosa in the proximal, mid and distal esophagus is normal.   A few prominent distal veinous blebs are noted. The squamo-columnar junction is at 40 cm where the Z-line was noted. A small possible hiatal hernia is noted.   TTS CRE 15 mm balloon dilation was performed  Stomach:   Multiple,small, shallow antral ulcers/erosions are noted with no active bleeding. Biopsies taken for VANESSA and for pathology. The gastric mucosa has erythema in the body. The fundus was found to be normal with no lesions noted on retroflexion. Endoscopic grading of gastroesophageal flap valve (GEFV)/Hill rdgrdrrdarddrderd:rd rd3rd The angularis is normal  Duodenum:   Edema and erosions noted at D1/D2 junction. Biopsies taken which resulted in oozing. A single  Resolution Hemoclip was applied with control of oozing. The bulb  is normal in appearance.    The duodenal folds are normal.        PMH:  Past Medical History:   Diagnosis Date    Acute on chronic respiratory failure with hypoxia and hypercapnia (HCC) 11/20/2020    Atrial fibrillation ([facility] 11/4/2020    CHF (congestive heart failure) ([facility])     Diabetes ([facility])     Hypertension     Ill-defined condition     high cholesterol    Ill-defined condition     tachycardia    JOSLYN (obstructive sleep apnea) 11/20/2020    Other and unspecified symptoms and signs involving general sensations and perceptions     ruptured disc    S/P cardiac cath 11/4/2020    11/3/2020 nonobstructive disease       PSH:  Past Surgical History:   Procedure Laterality Date    SC COMPRE ELECTROPHYSIOL XM W/LEFT ATRIAL PACNG/REC N/A 11/23/2020    Lt Atrial Pace & Record During Ep Study performed by Chela Patrick MD at [facility] CARDIAC CATH LAB    SC COMPRE EP EVAL ABLTJ 3D MAPG TX SVT N/A 11/23/2020    ABLATION A-FLUTTER performed by Chela Patrick MD at [facility]. ADD ON N/A 11/23/2020    Ablation Svt/Vt Add On performed by Chela Patrick MD at [facility] CARDIAC CATH LAB    SC INTRACARDIAC ELECTROPHYSIOLOGIC 3D MAPPING N/A 11/23/2020    Ep 3d Mapping performed by Chela Patrick MD at [facility] CARDIAC CATH LAB    SC STIM/PACING HEART POST IV DRUG INFU N/A 11/23/2020    Drug Stimulation performed by Chela Patrick MD at [facility] CARDIAC CATH LAB       Allergies: Allergies   Allergen Reactions    Cefepime Rash    Cefazolin Rash    Other Plant, Animal, Environmental Rash     Ivory soap caused rash on hands. Hospital Medications:  Current Facility-Administered Medications   Medication Dose Route Frequency    albuterol-ipratropium (DUO-NEB) 2.5 MG-0.5 MG/3 ML  3 mL Nebulization Q4H PRN    atorvastatin (LIPITOR) tablet 10 mg  10 mg Oral QHS    metoprolol tartrate (LOPRESSOR) tablet 25 mg  25 mg Oral BID    pantoprazole (PROTONIX) tablet 40 mg  40 mg Oral ACB&D    sodium chloride (NS) flush 5-40 mL  5-40 mL IntraVENous Q8H    sodium chloride (NS) flush 5-40 mL  5-40 mL IntraVENous PRN    acetaminophen (TYLENOL) tablet 650 mg  650 mg Oral Q6H PRN    Or    acetaminophen (TYLENOL) suppository 650 mg  650 mg Rectal Q6H PRN    polyethylene glycol (MIRALAX) packet 17 g  17 g Oral DAILY PRN    ondansetron (ZOFRAN ODT) tablet 4 mg  4 mg Oral Q8H PRN    Or    ondansetron (ZOFRAN) injection 4 mg  4 mg IntraVENous Q6H PRN    0.9% sodium chloride infusion 250 mL  250 mL IntraVENous PRN    insulin lispro (HUMALOG) injection   SubCUTAneous AC&HS    glucose chewable tablet 16 g  4 Tablet Oral PRN    glucagon (GLUCAGEN) injection 1 mg  1 mg IntraMUSCular PRN    dextrose 10% infusion 0-250 mL  0-250 mL IntraVENous PRN    albuterol-ipratropium (DUO-NEB) 2.5 MG-0.5 MG/3 ML  3 mL Nebulization QID RT    balsam peru-castor oiL (VENELEX) ointment   Topical BID    0.9% sodium chloride infusion 250 mL  250 mL IntraVENous PRN    0.9% sodium chloride infusion 250 mL  250 mL IntraVENous PRN       Home Medications:  Prior to Admission Medications   Prescriptions Last Dose Informant Patient Reported? Taking?   acetaminophen (TYLENOL) 325 mg tablet  Transfer Papers Yes No   Sig: Take 650 mg by mouth every six (6) hours as needed for Pain.    albuterol-ipratropium (DUO-NEB) 2.5 mg-0.5 mg/3 ml nebu   No No   Sig: 3 mL by Nebulization route every four (4) hours as needed for Wheezing, Shortness of Breath or Respiratory Distress. amiodarone (CORDARONE) 200 mg tablet 1/21/2023  Yes Yes   Sig: Take  by mouth. atorvastatin (LIPITOR) 10 mg tablet 1/21/2023  No Yes   Sig: Take 1 Tablet by mouth nightly. START AFTER COMPLETING ANTIBIOTICS TREATMENT ON 6/17   clotrimazole-betamethasone (LOTRISONE) topical cream   No No   Sig: Apply at affected area   furosemide (LASIX) 20 mg tablet 1/21/2023  Yes Yes   Sig: Take  by mouth daily. metoprolol (LOPRESSOR) 25 mg tablet 1/21/2023 Transfer Papers Yes Yes   Sig: Take 25 mg by mouth two (2) times a day. pantoprazole (PROTONIX) 40 mg tablet Not Taking  No No   Sig: Take 1 Tablet by mouth Daily (before breakfast). Patient not taking: Reported on 1/22/2023   rivaroxaban (Xarelto) 20 mg tab tablet 1/21/2023  Yes Yes   Sig: Take  by mouth daily. Facility-Administered Medications: None       Social History:  Social History     Tobacco Use    Smoking status: Former    Smokeless tobacco: Never   Substance Use Topics    Alcohol use: No       Family History:  No family history on file. Objective:   Patient Vitals for the past 8 hrs:   BP Temp Pulse Resp SpO2   01/22/23 1131 (!) 118/51 97.5 °F (36.4 °C) 67 18 92 %   01/22/23 1110 -- -- -- -- 96 %   01/22/23 1023 (!) 112/39 97.9 °F (36.6 °C) 68 19 100 %   01/22/23 0906 -- -- -- -- 99 %   01/22/23 0811 (!) 115/45 -- 65 18 100 %   01/22/23 0800 -- 97.4 °F (36.3 °C) -- -- --   01/22/23 0741 (!) 115/45 97.4 °F (36.3 °C) 66 27 99 %   01/22/23 0600 (!) 122/45 -- 64 25 100 %   01/22/23 0541 (!) 122/45 -- -- -- --     No intake/output data recorded.   01/20 1901 - 01/22 0700  In: 659 [I.V.:39]  Out: 100 [Urine:100]    EXAM:  Morbid obesity   NEURO-a&o   HEENT-wnl   LUNGS-clear    COR-regular rate and rhythym     ABD-soft , no tenderness, no rebound, good bs        Data Review     Recent Labs     01/22/23  0611 01/22/23  0033 01/21/23 1953   WBC  --  10.4 9.3   HGB 7.0* 5.8* 5.3*   HCT 23.8* 20.7* 19.8*   PLT  --  274 346     Recent Labs     01/22/23  0033 01/21/23 1953    140   K 4.6 4.4   CL 96* 95*   CO2 42* 42*   BUN 17 17   CREA 0.87 0.89   * 152*   CA 7.9* 8.3*     Recent Labs     01/21/23 1953   AP 97   TBILI 0.3   TP 7.2   ALB 2.0*   GLOB 5.2*   ALT 27     Recent Labs     01/21/23 1953   AP 97   TP 7.2   ALB 2.0*   GLOB 5.2*     No results for input(s): INR, PTP, APTT, INREXT in the last 72 hours.      IMAGING RESULTS:   []      I have personally reviewed the actual   []    CXR  []    CT  []     [address] discussed with:    []    Patient   []    Family   []    Nursing   []    Attending    Antonietta Keller MD

## 2023-01-22 NOTE — PROGRESS NOTES
Postoperative Note    Ritika Piña  YOB: 1960  [id number]    Surgeons: Venkat Govea. TOMEKA    Assistants: [unfilled]     Pre-operative Diagnosis: infected right big toe nail - Paronychia    Post-operative Diagnosis: Same    Procedure: Non Permanent nail avulsion    Anesthesia: YES Local with 0.5% Marcaine w/o epi    Hemostasis: Digital Tourniquet with Penrose Drain     Estimated Blood Loss: minimal    Complications:  none    Specimens: none    Implants: none    Graft: none    Procedure Detail: after local block the tourniquet was placed at the base of the hallux and using the spatula/freer nail was loosened from the proximal nail fold and removed entirely in a single piece. The medial and lateral nail folds hypertrophic granular tissue was debrided using english anvil and removed from the operative site.    Mild compressive bandage applied after covering the nail base with Xeroform and gauze dressing  Cap refill was checked at this point to be intact  Patient tolerated the anesthesia and procedure well with out any complications     Advised the patient to follow up with her own Podiatrist after discharge         Electronically signed by [unfilled] on 1/22/2023 at 2:26 PM

## 2023-01-22 NOTE — ED PROVIDER NOTES
\[facility]\"" EMERGENCY DEPT  EMERGENCY DEPARTMENT ENCOUNTER       Pt Name: Nba Goyal  MRN: [id number]  [place] 1960  Date of evaluation: 1/21/2023  Provider: Lorraine Ca MD   PCP: Unknown, Provider  Note Started: 9:00 PM 1/21/23     CHIEF COMPLAINT       Chief Complaint   Patient presents with    Other     Patient had blood work drawn yesterday, doctor called back today to say hemoglobin was low, patient does not know what the hemoglobin was        HISTORY OF PRESENT ILLNESS: 1 or more elements    History From: Patient  HPI Limitations : None     Nba Goyal is a 60 y.o. female with Pmhx listed below     Patient is a 78-year-old female with history of diabetes, hypertension, CHF, COPD on 4 L chronically, A. fib not on anticoagulation presenting with concern of low blood count. Patient states that she was told that she had low hemoglobin yesterday by her primary care doctor, patient did not know how low it was, states that she has been fatigued but denies any chest pain, shortness of breath, states that she has had no syncopal episodes, denies any noted bleeding or blood in her stool, no dark stools, states that she has no history of this in the past, has never had a blood transfusion in the past, no other complaints today. Nursing Notes were all reviewed and agreed with or any disagreements were addressed in the HPI. REVIEW OF SYSTEMS      Positives and Pertinent negatives as per HPI.     PAST HISTORY     Past Medical History:  Past Medical History:   Diagnosis Date    Acute on chronic respiratory failure with hypoxia and hypercapnia ([address] 11/20/2020    Atrial fibrillation ([address] 11/4/2020    CHF (congestive heart failure) ([address]     Diabetes ([address]     Hypertension     Ill-defined condition     high cholesterol    Ill-defined condition     tachycardia    JOSLYN (obstructive sleep apnea) 11/20/2020    Other and unspecified symptoms and signs involving general sensations and perceptions ruptured disc    S/P cardiac cath 11/4/2020    11/3/2020 nonobstructive disease     Previous Medications    ACETAMINOPHEN (TYLENOL) 325 MG TABLET    Take 650 mg by mouth every six (6) hours as needed for Pain. ALBUTEROL-IPRATROPIUM (DUO-NEB) 2.5 MG-0.5 MG/3 ML NEBU    3 mL by Nebulization route every four (4) hours as needed for Wheezing, Shortness of Breath or Respiratory Distress. ATORVASTATIN (LIPITOR) 10 MG TABLET    Take 1 Tablet by mouth nightly. START AFTER COMPLETING ANTIBIOTICS TREATMENT ON 6/17    CLOTRIMAZOLE-BETAMETHASONE (LOTRISONE) TOPICAL CREAM    Apply at affected area    METOPROLOL (LOPRESSOR) 25 MG TABLET    Take 25 mg by mouth two (2) times a day. PANTOPRAZOLE (PROTONIX) 40 MG TABLET    Take 1 Tablet by mouth Daily (before breakfast). Past Surgical History:  Past Surgical History:   Procedure Laterality Date    GA COMPRE ELECTROPHYSIOL XM W/LEFT ATRIAL PACNG/REC N/A 11/23/2020    Lt Atrial Pace & Record During Ep Study performed by Adilia Nair MD at [facility] CARDIAC CATH LAB    GA COMPRE EP EVAL ABLTJ 3D MAPG TX SVT N/A 11/23/2020    ABLATION A-FLUTTER performed by Adilia Nair MD at [facility] CARDIAC CATH LAB    GA ICAR CATHETER ABLATION ARRHYTHMIA ADD ON N/A 11/23/2020    Ablation Svt/Vt Add On performed by Adilia Nair MD at [address] [place] [address] CARDIAC CATH LAB    GA INTRACARDIAC ELECTROPHYSIOLOGIC 3D MAPPING N/A 11/23/2020    Ep 3d Mapping performed by Adilia Nair MD at [facility] CARDIAC CATH LAB    GA STIM/PACING HEART POST IV DRUG INFU N/A 11/23/2020    Drug Stimulation performed by Adilia Nair MD at [facility] LAB       Family History:  No family history on file. Social History:  Social History     Tobacco Use    Smoking status: Former    Smokeless tobacco: Never   Substance Use Topics    Alcohol use: No    Drug use: No       Allergies:   Allergies   Allergen Reactions    Cefepime Rash    Cefazolin Rash    Other Plant, Animal, Environmental Rash     [facility] soap caused rash on hands. PHYSICAL EXAM      ED Triage Vitals [01/21/23 1941]   ED Encounter Vitals Group      BP (!) 124/47      Pulse (Heart Rate) 74      Resp Rate 18      Temp 98.1 °F (36.7 °C)      Temp src       O2 Sat (%) 100 %      Weight 282 lb 3 oz      Height 5' 3\"        Physical Exam  Vitals reviewed. Constitutional:       General: She is not in acute distress. Appearance: Normal appearance. She is not ill-appearing, toxic-appearing or diaphoretic. HENT:      Head: Normocephalic. Mouth/Throat:      Mouth: Mucous membranes are dry. Eyes:      Conjunctiva/sclera: Conjunctivae normal.   Cardiovascular:      Rate and Rhythm: Normal rate. Pulmonary:      Effort: Pulmonary effort is normal. No respiratory distress. Abdominal:      General: Abdomen is flat. Tenderness: There is no abdominal tenderness. There is no guarding or rebound. Genitourinary:     Rectum: Normal.   Musculoskeletal:         General: No deformity. Cervical back: Neck supple. Skin:     General: Skin is warm and dry. Neurological:      General: No focal deficit present. Mental Status: She is alert. Psychiatric:         Mood and Affect: Mood normal.        EMERGENCY DEPARTMENT COURSE and DIFFERENTIAL DIAGNOSIS/MDM   CC/HPI Summary, DDx, ED Course, and Reassessment:     MDM  Number of Diagnoses or Management Options  Diagnosis management comments: Patient is 25-year-old female presenting with concern of low hemoglobin, found to be 5.2 today, told that it was low yesterday and clinic but unsure what the result was. Patient states that she has no history of this in the past, denies any known bleeding per rectum, patient states that she has had no chest pain shortness of breath but has had fatigue, denies any syncopal episodes.   Patient overall well-appearing on arrival, blood pressure initially stable but then trended down with maps in the 50s, patient states that she has history of low blood pressure unsure what her baseline is. Patient is warm well-perfused, no apparent distress, responding to questions appropriately, will continue to monitor blood pressure closely, do not feel that further fluids is indicated at this time, received 500 as well as blood, lactate initially elevated, possibly secondary to perfusion challenges with anemia. Lower suspicion for infection without any focal symptoms but obtaining blood cultures, x-ray and urine to evaluate this further. We will plan on transfusion of 1 unit of blood, will send Hemoccult although did not see any significant bleeding per rectum on rectal exam.  Consideration of iron deficiency anemia with likely microscopic blood loss, will reevaluate blood pressure throughout stay and if remained stable and lab work unremarkable will plan on admission to medicine for further work-up. Amount and/or Complexity of Data Reviewed  Clinical lab tests: reviewed  Tests in the radiology section of CPT®: reviewed  Decide to obtain previous medical records or to obtain history from someone other than the patient: yes        ED Course as of 01/21/23 2257   Sat Jan 21, 2023 2100 Patient presents with hyppotension and lactate elevated, does not have any source of infection at this time for so lower suspicion for sepsis, higher suspicion for acute anemia with hemoglobin of 5.3, 2 g decreased from baseline, denies any bleeding, will provide patient with blood at this time but will also obtain blood cultures, chest x-ray and urine to evaluate for possible sources of infection, if positive will consider antibiotics but otherwise hold off for now. [RN]   2243 Lactate improving after fluid and blood going, lower suspicion for infection at this time without any known source or complaints, will continue to monitor closely and plan for admission with anemia and labile blood pressure.  [RN]      ED Course User Index  [RN] Mychal Olivas MD       Vitals:    01/21/23 2145 01/21/23 2216 01/21/23 2230 01/21/23 2245   BP: (!) 100/41 (!) 105/45 (!) 109/48 (!) 106/45   Pulse: 70 68 69 68   Resp: 28 30     Temp:       SpO2: 99% 99% 97% 97%   Weight:       Height:            Patient was given the following medications:  Medications   0.9% sodium chloride infusion 250 mL (has no administration in time range)       CONSULTS:  None    Social Determinants affecting Dx or Tx: None    Records Reviewed (source and summary of external notes): Nursing Notes  DIAGNOSTIC RESULTS   LABS:     Recent Results (from the past 12 hour(s))   CBC WITH AUTOMATED DIFF    Collection Time: 01/21/23  7:53 PM   Result Value Ref Range    WBC 9.3 3.6 - 11.0 K/uL    RBC 1.98 (L) 3.80 - 5.20 M/uL    HGB 5.3 (LL) 11.5 - 16.0 g/dL    HCT 19.8 (L) 35.0 - 47.0 %    .0 (H) 80.0 - 99.0 FL    MCH 26.8 26.0 - 34.0 PG    MCHC 26.8 (L) 30.0 - 36.5 g/dL    RDW 18.4 (H) 11.5 - 14.5 %    PLATELET 057 498 - 651 K/uL    MPV 11.2 8.9 - 12.9 FL    NRBC 0.6 (H) 0  WBC    ABSOLUTE NRBC 0.06 (H) 0.00 - 0.01 K/uL    NEUTROPHILS 71 32 - 75 %    LYMPHOCYTES 23 12 - 49 %    MONOCYTES 4 (L) 5 - 13 %    EOSINOPHILS 1 0 - 7 %    BASOPHILS 0 0 - 1 %    IMMATURE GRANULOCYTES 1 (H) 0.0 - 0.5 %    ABS. NEUTROPHILS 6.6 1.8 - 8.0 K/UL    ABS. LYMPHOCYTES 2.1 0.8 - 3.5 K/UL    ABS. MONOCYTES 0.4 0.0 - 1.0 K/UL    ABS. EOSINOPHILS 0.1 0.0 - 0.4 K/UL    ABS. BASOPHILS 0.0 0.0 - 0.1 K/UL    ABS. IMM.  GRANS. 0.1 (H) 0.00 - 0.04 K/UL    DF SMEAR SCANNED      PLATELET COMMENTS Large Platelets      RBC COMMENTS ANISOCYTOSIS  1+        RBC COMMENTS MACROCYTOSIS  1+        RBC COMMENTS HYPOCHROMIA  1+        RBC COMMENTS POLYCHROMASIA  PRESENT        RBC COMMENTS STOMATOCYTES     METABOLIC PANEL, COMPREHENSIVE    Collection Time: 01/21/23  7:53 PM   Result Value Ref Range    Sodium 140 136 - 145 mmol/L    Potassium 4.4 3.5 - 5.1 mmol/L    Chloride 95 (L) 97 - 108 mmol/L    CO2 42 (HH) 21 - 32 mmol/L    Anion gap 3 (L) 5 - 15 mmol/L    Glucose 152 (H) 65 - 100 mg/dL    BUN 17 6 - 20 MG/DL    Creatinine 0.89 0.55 - 1.02 MG/DL    BUN/Creatinine ratio 19 12 - 20      eGFR >60 >60 ml/min/1.73m2    Calcium 8.3 (L) 8.5 - 10.1 MG/DL    Bilirubin, total 0.3 0.2 - 1.0 MG/DL    ALT (SGPT) 27 12 - 78 U/L    AST (SGOT) 52 (H) 15 - 37 U/L    Alk. phosphatase 97 45 - 117 U/L    Protein, total 7.2 6.4 - 8.2 g/dL    Albumin 2.0 (L) 3.5 - 5.0 g/dL    Globulin 5.2 (H) 2.0 - 4.0 g/dL    A-G Ratio 0.4 (L) 1.1 - 2.2     TYPE & SCREEN    Collection Time: 01/21/23  7:53 PM   Result Value Ref Range    Crossmatch Expiration 01/24/2023,2359     ABO/Rh(D) O POSITIVE     Antibody screen NEG     Unit number [id number]     Blood component type RC LR     Unit division 00     Status of unit ISSUED     Crossmatch result Compatible    POC LACTIC ACID    Collection Time: 01/21/23  8:01 PM   Result Value Ref Range    Lactic Acid (POC) 3.86 (HH) 0.40 - 2.00 mmol/L   RBC, ALLOCATE    Collection Time: 01/21/23  8:30 PM   Result Value Ref Range    HISTORY CHECKED? Historical check performed    OCCULT BLOOD, STOOL    Collection Time: 01/21/23 10:30 PM   Result Value Ref Range    Occult blood, stool Negative NEG     POC LACTIC ACID    Collection Time: 01/21/23 10:32 PM   Result Value Ref Range    Lactic Acid (POC) 3.31 (HH) 0.40 - 2.00 mmol/L        EKG:      RADIOLOGY:  Non-plain film images such as CT, Ultrasound and MRI are read by the radiologist.   Plain radiographic images are often visualized and preliminarily interpreted by the ED, if an interpretation was completed the findings will be listed below:        Interpretation per the Radiologist below, if available at the time of this note:     XR CHEST PORT    Result Date: 1/21/2023  EXAM:  XR CHEST PORT INDICATION: Eval for pneumonia COMPARISON: Chest radiograph 6/4/2022 TECHNIQUE: Supine portable chest AP view FINDINGS: Cardiomediastinal silhouette is stably enlarged. Diffuse hazy interstitial opacities suggestive of edema.  Bandlike right upper lobe atelectasis. No definite pleural effusion or pneumothorax. Diffuse hazy interstitial opacities suggestive of edema. Bandlike right upper lobe atelectasis. PROCEDURES   Unless otherwise noted below, none  Procedures     CRITICAL CARE TIME       FINAL IMPRESSION     1. Iron deficiency anemia, unspecified iron deficiency anemia type          DISPOSITION/PLAN   Admitted    Admit     PATIENT REFERRED TO:  Follow-up Information       Follow up With Specialties Details Why Contact Info    Unknown, Provider    Patient not available to ask                DISCHARGE MEDICATIONS:  Current Discharge Medication List            DISCONTINUED MEDICATIONS:  Current Discharge Medication List          I am the Primary Clinician of Record. Signed By: Jeronimo Nguyen MD     January 21, 2023      (Please note that parts of this dictation were completed with voice recognition software. Quite often unanticipated grammatical, syntax, homophones, and other interpretive errors are inadvertently transcribed by the computer software. Please disregards these errors.  Please excuse any errors that have escaped final proofreading.)

## 2023-01-22 NOTE — ED NOTES
Notified Aditi Ayala MD about patient's diastolic blood pressure consistently in 40's, per MD order to report if diastolic below 50.

## 2023-01-22 NOTE — ED NOTES
Verbal shift change report given to Loring Litten, RN (oncoming nurse) by Garth Wang RN (offgoing nurse). Report included the following information SBAR and ED Summary.

## 2023-01-22 NOTE — CONSULTS
Seen patient in the room for infected right big toe    Verbally responsive and complaints of painful toe \"cat scratched the toe\"    Will need avulsion of the toe nail can be done at bedside under local     Will try to do the procedure asap    Thank you for the consult     Full note to follow

## 2023-01-22 NOTE — H&P
This note is compiled to communicate with the medical care team. It may contain sensitive and protected information. It is not intended to serve as a source of communication with patients/families/friends; that communication occurs at the bedside or via alternative direct communications means (secure messaging, letters, video/telephone, etc.). Hospitalist Admission Note    NAME: Jeff Hurtado   :  1960   MRN:  [id number]     Date/Time:  2023 1:41 AM    Patient PCP: Unknown, Provider  ______________________________________________________________________  Given the patient's current clinical presentation, I have a high level of concern for decompensation if discharged from the emergency department. Complex decision making was performed, which includes reviewing the patient's available past medical records, laboratory results, and x-ray films. My assessment of this patient's clinical condition and my plan of care is as follows. Subjective:   CHIEF COMPLAINT: Symptomatic anemia    HISTORY OF PRESENT ILLNESS:     Corky Gibbs is a 60 y.o.  female with pertinent past medical history of chronic hypoxemic respiratory failure on 4 L/min home oxygen therapy secondary to COPD, diabetes mellitus, essential hypertension, hyperlipidemia, JOSLYN compliant with CPAP, history duodenal ulcers, chronic anemia of indeterminate etiology who presents after routine lab work performed yesterday demonstrated hemoglobin of 5.3. Patient denies any sources of blood loss including hematochezia, melena, hematuria, hematemesis, epistaxis, bruising/bleeding. She reports she has had lower than normal blood pressures at home with systolics in the 61Y when her baseline is in the 130s, but denies any other associated symptoms. Medication reconciliation does not list any anticoagulation, but patient reports she is taking a blood thinner-unsure which one. ROS otherwise negative.   Patient denies tobacco, alcohol, or illicit drug use. In the ED, patient afebrile and hemodynamically stable saturating upper 90s on baseline 4 L/min home oxygen therapy. Labs demonstrate: WBC 9.3, hemoglobin 5.3, platelets 795, sodium 140, potassium 4.4, chloride 95, bicarb 42 (chronic), glucose 152, BUN 17, creatinine 0.89, lactic acid 3.86 improved to 2.4 with IV fluids and 1 unit packed red blood cells. Fecal occult blood negative. UA not reflexed to culture. CXR demonstrates diffuse hazy interstitial opacities suggestive of edema with bandlike right upper lobe atelectasis. We were asked to admit for work up and evaluation of the above problems.      Past Medical History:   Diagnosis Date    Acute on chronic respiratory failure with hypoxia and hypercapnia (HCC) 11/20/2020    Atrial fibrillation ([facility] 11/4/2020    CHF (congestive heart failure) ([facility]     Diabetes ([facility]     Hypertension     Ill-defined condition     high cholesterol    Ill-defined condition     tachycardia    JOSLYN (obstructive sleep apnea) 11/20/2020    Other and unspecified symptoms and signs involving general sensations and perceptions     ruptured disc    S/P cardiac cath 11/4/2020    11/3/2020 nonobstructive disease        Past Surgical History:   Procedure Laterality Date    TN COMPRE ELECTROPHYSIOL XM W/LEFT ATRIAL PACNG/REC N/A 11/23/2020    Lt Atrial Pace & Record During Ep Study performed by Kary Baptiste MD at [facility] CARDIAC CATH LAB    TN COMPRE EP EVAL ABLTJ 3D MAPG TX SVT N/A 11/23/2020    ABLATION A-FLUTTER performed by Kary Baptiste MD at [facility]. ADD ON N/A 11/23/2020    Ablation Svt/Vt Add On performed by Kary Baptiste MD at [facility] CARDIAC CATH LAB    TN INTRACARDIAC ELECTROPHYSIOLOGIC 3D MAPPING N/A 11/23/2020    Ep 3d Mapping performed by Kary Baptiste MD at [facility] CARDIAC CATH LAB    TN STIM/PACING HEART POST IV DRUG INFU N/A 11/23/2020    Drug Stimulation performed by Nellie Frey Cinthia Machuca MD at [facility] CARDIAC CATH LAB       Social History     Tobacco Use    Smoking status: Former    Smokeless tobacco: Never   Substance Use Topics    Alcohol use: No        No family history on file. Allergies   Allergen Reactions    Cefepime Rash    Cefazolin Rash    Other Plant, Animal, Environmental Rash     Ivory soap caused rash on hands. Prior to Admission medications    Medication Sig Start Date End Date Taking? Authorizing Provider   clotrimazole-betamethasone (LOTRISONE) topical cream Apply at affected area 6/17/22   Manuela Jang MD   pantoprazole (PROTONIX) 40 mg tablet Take 1 Tablet by mouth Daily (before breakfast). 6/18/22   Manuela Jang MD   atorvastatin (LIPITOR) 10 mg tablet Take 1 Tablet by mouth nightly. START AFTER COMPLETING ANTIBIOTICS TREATMENT ON 6/17 6/17/22   Sameera Bal MD   albuterol-ipratropium (DUO-NEB) 2.5 mg-0.5 mg/3 ml nebu 3 mL by Nebulization route every four (4) hours as needed for Wheezing, Shortness of Breath or Respiratory Distress. 12/8/20   Alie Villarreal MD   acetaminophen (TYLENOL) 325 mg tablet Take 650 mg by mouth every six (6) hours as needed for Pain. Provider, Chun   metoprolol (LOPRESSOR) 25 mg tablet Take 25 mg by mouth two (2) times a day. Other, MD Russ       REVIEW OF SYSTEMS:       Total of 12 systems reviewed with pertinent positives and negatives noted in HPI      Objective:   VITALS:    Visit Vitals  BP (!) 121/44   Pulse 69   Temp 98.2 °F (36.8 °C)   Resp 28   Ht 5' 3\" (1.6 m)   Wt 128 kg (282 lb 3 oz)   SpO2 100%   BMI 49.99 kg/m²       PHYSICAL EXAM:    General:   Alert, cooperative, no distress, chronically ill-appearing morbidly obese  female        HEENT:  Atraumatic, anicteric sclerae, pale conjunctiva, mucosa moist, dentition fair     Neck:  Supple, symmetrical, trachea midline, no abnormalities on palpation     Lungs:   CTA B, diminished in bases. Symmetric expansion. Good aeration.  Normal respiratory effort. Chest wall:  No tenderness. No Accessory muscle use. Cardiovascular:   RRR, No murmur/rub/gallop. No JVD. Radial/AT/DP pulse 2+     GI/:   Obese/protuberant soft, non-tender. Not distended. Bowel sounds normal. No HSM     Extremities Moderate/severe BLE pitting edema no cyanosis. Capillary refill normal     Skin: No significant lesions noted. Not Jaundiced. No rashes      Neurologic: PERRL. EOMI. No facial asymmetry. No aphasia or slurred speech. Moves all extremities. Sensation to light touch grossly intact BUE/BLE. No overt focal deficits appreciated     Psych:  Alert and oriented X 4. Normal affect. Good insight     Other:   N/A         LAB DATA REVIEWED:    Recent Results (from the past 24 hour(s))   CBC WITH AUTOMATED DIFF    Collection Time: 01/21/23  7:53 PM   Result Value Ref Range    WBC 9.3 3.6 - 11.0 K/uL    RBC 1.98 (L) 3.80 - 5.20 M/uL    HGB 5.3 (LL) 11.5 - 16.0 g/dL    HCT 19.8 (L) 35.0 - 47.0 %    .0 (H) 80.0 - 99.0 FL    MCH 26.8 26.0 - 34.0 PG    MCHC 26.8 (L) 30.0 - 36.5 g/dL    RDW 18.4 (H) 11.5 - 14.5 %    PLATELET 533 837 - 915 K/uL    MPV 11.2 8.9 - 12.9 FL    NRBC 0.6 (H) 0  WBC    ABSOLUTE NRBC 0.06 (H) 0.00 - 0.01 K/uL    NEUTROPHILS 71 32 - 75 %    LYMPHOCYTES 23 12 - 49 %    MONOCYTES 4 (L) 5 - 13 %    EOSINOPHILS 1 0 - 7 %    BASOPHILS 0 0 - 1 %    IMMATURE GRANULOCYTES 1 (H) 0.0 - 0.5 %    ABS. NEUTROPHILS 6.6 1.8 - 8.0 K/UL    ABS. LYMPHOCYTES 2.1 0.8 - 3.5 K/UL    ABS. MONOCYTES 0.4 0.0 - 1.0 K/UL    ABS. EOSINOPHILS 0.1 0.0 - 0.4 K/UL    ABS. BASOPHILS 0.0 0.0 - 0.1 K/UL    ABS. IMM.  GRANS. 0.1 (H) 0.00 - 0.04 K/UL    DF SMEAR SCANNED      PLATELET COMMENTS Large Platelets      RBC COMMENTS ANISOCYTOSIS  1+        RBC COMMENTS MACROCYTOSIS  1+        RBC COMMENTS HYPOCHROMIA  1+        RBC COMMENTS POLYCHROMASIA  PRESENT        RBC COMMENTS STOMATOCYTES     METABOLIC PANEL, COMPREHENSIVE    Collection Time: 01/21/23  7:53 PM   Result Value Ref Range    Sodium 140 136 - 145 mmol/L    Potassium 4.4 3.5 - 5.1 mmol/L    Chloride 95 (L) 97 - 108 mmol/L    CO2 42 (HH) 21 - 32 mmol/L    Anion gap 3 (L) 5 - 15 mmol/L    Glucose 152 (H) 65 - 100 mg/dL    BUN 17 6 - 20 MG/DL    Creatinine 0.89 0.55 - 1.02 MG/DL    BUN/Creatinine ratio 19 12 - 20      eGFR >60 >60 ml/min/1.73m2    Calcium 8.3 (L) 8.5 - 10.1 MG/DL    Bilirubin, total 0.3 0.2 - 1.0 MG/DL    ALT (SGPT) 27 12 - 78 U/L    AST (SGOT) 52 (H) 15 - 37 U/L    Alk. phosphatase 97 45 - 117 U/L    Protein, total 7.2 6.4 - 8.2 g/dL    Albumin 2.0 (L) 3.5 - 5.0 g/dL    Globulin 5.2 (H) 2.0 - 4.0 g/dL    A-G Ratio 0.4 (L) 1.1 - 2.2     TYPE & SCREEN    Collection Time: 01/21/23  7:53 PM   Result Value Ref Range    Crossmatch Expiration 01/24/2023,2359     ABO/Rh(D) O POSITIVE     Antibody screen NEG     Unit number [id number]     Blood component type [facility]     Unit division 00     Status of unit ISSUED     Crossmatch result Compatible     Unit number [id number]     Blood component type [facility]     Unit division 00     Status of unit ALLOCATED     Crossmatch result Compatible    POC LACTIC ACID    Collection Time: 01/21/23  8:01 PM   Result Value Ref Range    Lactic Acid (POC) 3.86 (HH) 0.40 - 2.00 mmol/L   RBC, ALLOCATE    Collection Time: 01/21/23  8:30 PM   Result Value Ref Range    HISTORY CHECKED?  Historical check performed    OCCULT BLOOD, STOOL    Collection Time: 01/21/23 10:30 PM   Result Value Ref Range    Occult blood, stool Negative NEG     POC LACTIC ACID    Collection Time: 01/21/23 10:32 PM   Result Value Ref Range    Lactic Acid (POC) 3.31 (HH) 0.40 - 2.00 mmol/L   LACTIC ACID    Collection Time: 01/22/23 12:33 AM   Result Value Ref Range    Lactic acid 2.4 (HH) 0.4 - 2.0 MMOL/L   METABOLIC PANEL, BASIC    Collection Time: 01/22/23 12:33 AM   Result Value Ref Range    Sodium 139 136 - 145 mmol/L    Potassium 4.6 3.5 - 5.1 mmol/L    Chloride 96 (L) 97 - 108 mmol/L    CO2 42 (HH) 21 - 32 mmol/L    Anion gap 1 (L) 5 - 15 mmol/L    Glucose 129 (H) 65 - 100 mg/dL    BUN 17 6 - 20 MG/DL    Creatinine 0.87 0.55 - 1.02 MG/DL    BUN/Creatinine ratio 20 12 - 20      eGFR >60 >60 ml/min/1.73m2    Calcium 7.9 (L) 8.5 - 10.1 MG/DL   CBC WITH AUTOMATED DIFF    Collection Time: 01/22/23 12:33 AM   Result Value Ref Range    WBC 10.4 3.6 - 11.0 K/uL    RBC 2.14 (L) 3.80 - 5.20 M/uL    HGB 5.8 (LL) 11.5 - 16.0 g/dL    HCT 20.7 (L) 35.0 - 47.0 %    MCV 96.7 80.0 - 99.0 FL    MCH 27.1 26.0 - 34.0 PG    MCHC 28.0 (L) 30.0 - 36.5 g/dL    RDW 17.5 (H) 11.5 - 14.5 %    PLATELET 806 278 - 851 K/uL    MPV 10.9 8.9 - 12.9 FL    NRBC 0.4 (H) 0  WBC    ABSOLUTE NRBC 0.04 (H) 0.00 - 0.01 K/uL    NEUTROPHILS PENDING %    LYMPHOCYTES PENDING %    MONOCYTES PENDING %    EOSINOPHILS PENDING %    BASOPHILS PENDING %    IMMATURE GRANULOCYTES PENDING %    ABS. NEUTROPHILS PENDING K/UL    ABS. LYMPHOCYTES PENDING K/UL    ABS. MONOCYTES PENDING K/UL    ABS. EOSINOPHILS PENDING K/UL    ABS. BASOPHILS PENDING K/UL    ABS. IMM. GRANS. PENDING K/UL    DF PENDING    SAMPLES BEING HELD    Collection Time: 01/22/23 12:33 AM   Result Value Ref Range    SAMPLES BEING HELD LV     COMMENT        Add-on orders for these samples will be processed based on acceptable specimen integrity and analyte stability, which may vary by analyte.    URINALYSIS W/ REFLEX CULTURE    Collection Time: 01/22/23 12:36 AM    Specimen: Urine   Result Value Ref Range    Color YELLOW/STRAW      Appearance TURBID (A) CLEAR      Specific gravity 1.027      pH (UA) 5.5 5.0 - 8.0      Protein TRACE (A) NEG mg/dL    Glucose Negative NEG mg/dL    Ketone Negative NEG mg/dL    Bilirubin Negative NEG      Blood Negative NEG      Urobilinogen 1.0 0.2 - 1.0 EU/dL    Nitrites Positive (A) NEG      Leukocyte Esterase SMALL (A) NEG      WBC 5-10 0 - 4 /hpf    RBC 0-5 0 - 5 /hpf    Epithelial cells MODERATE (A) FEW /lpf    Bacteria 2+ (A) NEG /hpf    UA:UC IF INDICATED CULTURE NOT INDICATED BY UA RESULT CNI      Amorphous Crystals 3+ (A) NEG   RBC, ALLOCATE    Collection Time: 01/22/23  1:30 AM   Result Value Ref Range    HISTORY CHECKED? Historical check performed        IMAGING:  CXR-Diffuse hazy interstitial opacities suggestive of edema.  Bandlike right upper lobe atelectasis  ______________________________________________________________________    Assessment / Plan:  Acute on chronic anemia-suspect GI bleeding  History bleeding duodenal ulcers/gastritis  Chronic hypoxemic respiratory failure  COPD  JOSLYN compliant with CPAP  Paroxysmal atrial fibrillation reportedly on anticoagulation-medication unknown  CAD  Diastolic heart failure  Hyperglycemia without diagnosis of diabetes mellitus  Obesity class III by [id number]      PLAN:    Acute on chronic anemia-suspect GI bleeding  History bleeding duodenal ulcers/gastritis  Admit to telemetry monitoring  Gastroenterology consulted, greatly appreciate their expertise  -Suspect she is experiencing intermittent GI bleeding particularly in setting of known lesions EGD from 6/2022 demonstrating: Antral ulcers, gastritis, duodenal erosions D1/D2 junction  Start twice daily PPI  Trend hemoglobin  Agree with 1 unit packed red blood cell transfusion-we will repeat until hemoglobin greater than 7.0  Monitor volume status closely May require diuresis, but blood pressures are on the softer side so we will only pursue if oxygen requirement increases or BP improves  N.p.o. for potential endoscopy    Chronic hypoxemic respiratory failure  COPD  Continuous pulse oximetry and supplemental oxygen to maintain saturations greater than 90%  DuoNebs every 4 hours as needed    JOSLYN compliant with CPAP  Nocturnal CPAP ordered per patient request    Paroxysmal atrial fibrillation reportedly on anticoagulation-medication unknown  CAD  Diastolic heart failure  Continue PTA atorvastatin, metoprolol    Hyperglycemia without diagnosis of diabetes mellitus  Sliding scale corrective coverage insulin  Obtain hemoglobin A1c    Obesity class III by BMI-49.99  Recommend medically assisted weight loss in outpatient setting    Code Status: Full    DVT Prophylaxis: Held  GI Prophylaxis: Protonix  Diet: N.p.o.       Care Plan discussed with:    Comments   Patient x    Family      RN     Care Manager                    Consultant:      _______________________________________________________________________  Baseline Level of Function: Debilitated    Expected  Disposition:   Home with Family    HH/PT/OT/RN x   SNF/LTC    JUAN M    ________________________________________________________________________  TOTAL TIME:  75 Minutes   MEDICAL COMPLEXITY: high  TOBACCO CESSATION COUNSELING: NO        Comments    x Reviewed previous records   >50% of visit spent in counseling and coordination of care x Discussion with patient and/or family and questions answered       ________________________________________________________________________  Signed: Esau Caldera DO  1/22/2023 1:41 AM    Please note that this documentation was completed in part with Dragon dictation software. Occasionally unanticipated grammatical, syntax, homophones, and other interpretive errors are inadvertently transcribed by the computer software. Please excuse and disregard any errors that have escaped final proofreading. If in doubt, please do not hesitate to reach out to myself or the assigned hospitalist via [facility] paging system for clarification.

## 2023-01-22 NOTE — PROGRESS NOTES
Problem: Pressure Injury - Risk of  Goal: *Prevention of pressure injury  Description: Document Gaston Scale and appropriate interventions in the flowsheet. 1/22/2023 1809 by Ryanne Sher RN  Outcome: Progressing Towards Goal  Note: Pressure Injury Interventions:       Moisture Interventions: Absorbent underpads, Check for incontinence Q2 hours and as needed, Internal/External urinary devices, Maintain skin hydration (lotion/cream)    Activity Interventions: Chair cushion, Increase time out of bed, Pressure redistribution bed/mattress(bed type)    Mobility Interventions: Float heels, Pressure redistribution bed/mattress (bed type), Turn and reposition approx. every two hours(pillow and wedges)    Nutrition Interventions: Document food/fluid/supplement intake    Friction and Shear Interventions: Lift sheet             1/22/2023 1807 by Ryanne Sher RN  Outcome: Progressing Towards Goal  Note: Pressure Injury Interventions:       Moisture Interventions: Absorbent underpads, Check for incontinence Q2 hours and as needed, Internal/External urinary devices, Maintain skin hydration (lotion/cream)    Activity Interventions: Chair cushion, Increase time out of bed, Pressure redistribution bed/mattress(bed type)    Mobility Interventions: Float heels, Pressure redistribution bed/mattress (bed type), Turn and reposition approx. every two hours(pillow and wedges)    Nutrition Interventions: Document food/fluid/supplement intake    Friction and Shear Interventions: Lift sheet                Problem: Falls - Risk of  Goal: *Absence of Falls  Description: Document Anna Fall Risk and appropriate interventions in the flowsheet.   1/22/2023 1809 by Ryanne Sher RN  Outcome: Progressing Towards Goal  Note: Fall Risk Interventions:                             1/22/2023 1807 by Ryanne Sher RN  Outcome: Progressing Towards Goal  Note: Fall Risk Interventions:                                Problem: Heart Failure: Day 2  Goal: Off Pathway (Use only if patient is Off Pathway)  Outcome: Progressing Towards Goal  Goal: Activity/Safety  Outcome: Progressing Towards Goal  Goal: Consults, if ordered  Outcome: Progressing Towards Goal  Goal: Diagnostic Test/Procedures  Outcome: Progressing Towards Goal  Goal: Nutrition/Diet  Outcome: Progressing Towards Goal  Goal: Discharge Planning  Outcome: Progressing Towards Goal  Goal: Medications  Outcome: Progressing Towards Goal  Goal: Respiratory  Outcome: Progressing Towards Goal  Goal: Treatments/Interventions/Procedures  Outcome: Progressing Towards Goal  Goal: Psychosocial  Outcome: Progressing Towards Goal  Goal: *Oxygen saturation within defined limits  Outcome: Progressing Towards Goal  Goal: *Hemodynamically stable  Outcome: Progressing Towards Goal  Goal: *Optimal pain control at patient's stated goal  Outcome: Progressing Towards Goal  Goal: *Anxiety reduced or absent  Outcome: Progressing Towards Goal  Goal: *Demonstrates progressive activity  Outcome: Progressing Towards Goal

## 2023-01-22 NOTE — PROGRESS NOTES
End of Shift Note    Bedside shift change report given to [facility], RN (oncoming nurse) by Lacho Steinberg RN (offgoing nurse). Report included the following information SBAR, Kardex, and Recent Results    Shift worked: 7a-7p   Shift summary and any significant changes:    New admission. I unit of blood given-ended 1755. Podiatrist removed R great toenail. Dressing may remain for a few days. Capillary flow is satisfactory.        Concerns for physician to address: none   Zone phone for oncoming shift:  [phone]     Patient Information  Susie Mcgowan  60 y.o.  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    Sciatica 04/21/2022    Hypoxia 04/21/2022    COPD exacerbation ([address] 04/21/2022    CHF exacerbation ([address] 04/21/2022    SOB (shortness of breath) 12/04/2020    Physical deconditioning 12/04/2020    Counseling regarding advance care planning and goals of care 12/04/2020    Atrial flutter with rapid ventricular response ([address] 11/20/2020    JOSLYN (obstructive sleep apnea) 11/20/2020    Acute on chronic respiratory failure with hypoxia and hypercapnia ([address] 11/20/2020    Acute respiratory failure ([address] 11/18/2020    Mixed hyperlipidemia 11/15/2020    Morbid obesity ([address] 11/15/2020    A-fib ([address] 11/04/2020    S/P cardiac cath 11/04/2020    COVID-19 ruled out 07/24/2020     Past Medical History:   Diagnosis Date    Acute on chronic respiratory failure with hypoxia and hypercapnia ([address] 11/20/2020    Atrial fibrillation ([address] 11/4/2020    CHF (congestive heart failure) ([facility]     Diabetes ([facility] Hypertension     Ill-defined condition     high cholesterol    Ill-defined condition     tachycardia    JOSLYN (obstructive sleep apnea) 2020    Other and unspecified symptoms and signs involving general sensations and perceptions     ruptured disc    S/P cardiac cath 2020    11/3/2020 nonobstructive disease       Core Measures:  CVA: No No  CHF:Yes Yes  PNA:No No    Activity:  Activity Level: Bed Rest  Number times ambulated in hallways past shift: 0  Number of times OOB to chair past shift: 0    Cardiac:   Cardiac Monitoring: Yes      Cardiac Rhythm: Sinus Christyne Bevel    Access:   Current line(s): PIV   Central Line? No    Genitourinary:   Urinary status:   Urinary Catheter?  No    Respiratory:   O2 Device: Nasal cannula  Chronic home O2 use?: YES  Incentive spirometer at bedside: NO       GI:  Last Bowel Movement Date: 23  Current diet:  ADULT DIET Dysphagia - Minced & Moist; 5 carb choices (75 gm/meal); meat  DIET ONE TIME MESSAGE  Passing flatus: YES  Tolerating current diet: YES       Pain Management:   Patient states pain is manageable on current regimen: YES    Skin:  Gaston Score: 11  Interventions: speciality bed, float heels, increase time out of bed, limit briefs, and nutritional support     Patient Safety:  Fall Score:    Interventions: bed/chair alarm, assistive device (walker, cane, etc), gripper socks, pt to call before getting OOB, and stay with me (per policy)       DVT prophylaxis:  DVT prophylaxis Med-   DVT prophylaxis SCD or SHANNON-      Wounds: (If Applicable)  Wounds-   Location ***    Active Consults:  IP CONSULT TO GASTROENTEROLOGY  IP CONSULT TO PODIATRY    Length of Stay:  Expected LOS: - - -  Actual LOS: 0  Discharge Plan: {Discharge Plannin} ***      Doug Cobb RN

## 2023-01-22 NOTE — ED NOTES
Change of shift report received from ED RN at this time. Received 2 units of blood this admission. Last hemoglobin 7.0. Resting in room with eyes open. Remains on cardiac monitoring. /47 at this time. 100 % oxygen saturation on oxygen per nasal cannula. Afrebrile. Will review orders and return for shift assessment. [id number]  Bedside shift report completed except unable to visualize posterior due to ED stretcher. Patient bedbound patient at baseline per patient report. Denies falls in the past 12 months. 7170  GI consult called. ADMISSION SBAR NOTE    IP UNIT CALLED NOTE IS READY: Yes Spoke to Sima Lozano  IF there are questions Call Jose Guadalupe Tineo  at phone # [phone]    SITUATION/BACKGROUND:    Patient is being transferred to [address], Room# 134    Patient's Chief Complaint was low hemoglobin result called by her doctor and told to go to the Emergency department. Complaints of feelings of fatigue and is admitted for hemoglobin on arrival was 5.3 given one unit of packed red blood cells and repeat hemoglobin 5.8. Given 2nd unit of blood and repeat hemoglobin then 7.0. Lactic at 0033 was noted to be 2.4. Repeat lactic acid sent at 0741 was 1.9 no repeat lactic needed. CODE STATUS: Full Code    ISOLATION/PRECAUTIONS: Yes   ISOLATION TYPE: none    Called outstanding consults: YES; called Gastroenterology consult     Are there still sign and held orders that need to be released? No     STAT labs collected: No   REPEAT LACTIC ACID DUE? NO TIME DUE: repeat lactic now 1.9    All STAT orders are complete: Yes    The following personal items will be sent with the patient during transfer to the floor:   All valuables:   ITEM:    ITEM: Visual Aid: None  ITEM:    ITEM:    ITEM:         ASSESSMENT:    CIWA Assessment: No  Last Score: not indicated    NEURO:     NIH SCORE:    MANN SCREENING:      NEURO ASSESSMENT: Neuro  Orientation Level: Oriented X4 (01/22/23 0741)  Cognition: Follows commands (01/22/23 2269)  Speech: Clear (01/22/23 0741)  Assessment L Pupil: Brisk (01/22/23 0741)  Size L Pupil (mm): 3 (01/22/23 0741)  Assessment R Pupil: Brisk (01/22/23 0741)  Size R Pupil (mm): 3 (01/22/23 0741)  LUE Motor Response: Purposeful (01/22/23 0741)  LLE Motor Response: Purposeful (01/22/23 0741)  RUE Motor Response: Purposeful (01/22/23 0741)  RLE Motor Response: Purposeful (01/22/23 0741)    Is patient impulsive? No   Is patient oriented? Yes   Do they follow commands? Yes  Is the patient ambulatory? No  Device need bedbound patient at baseline complete care patient    FALL RISK? No    INTERVENTIONS: bed alarm    RESPIRATORY: Is patient on Oxygen? Yes ; reports baseline 4 liters  OXYGEN: Oxygen Therapy  O2 Device: Nasal cannula (01/22/23 0741)  O2 Flow Rate (L/min): 3 l/min (01/22/23 0741)  FIO2 (%): 32 % (01/22/23 0132)    CARDIAC: Is cardiac monitoring ordered? Yes Last Rhythm: normal sinus rhythm  Patient to transfer with tele box on? Yes  Is patient using a LIFE VEST? No     LINE ACCESS:   Peripheral IV 01/21/23 Right Antecubital (Active)   Site Assessment Clean, dry, & intact 01/21/23 2020   Phlebitis Assessment 0 01/21/23 2020   Infiltration Assessment 0 01/21/23 2020   Dressing Status Clean, dry, & intact 01/21/23 2020   Dressing Type Transparent 01/21/23 2020   Hub Color/Line Status Pink;Flushed;Patent 01/21/23 2020   Action Taken Blood drawn 01/21/23 2020   Alcohol Cap Used Yes 01/21/23 2020        /GI: CONTINENT BOWEL/BLADDER? Patient aware of when she needs to urinate or stool. External urinary device in place at this time. URINARY OUTPUT: external catheter  CHRONIC OR ACUTE? Not applicable   If CHRONIC, is it 1days old, was it changed prior to specimen collection?  not  applicable  WAS UA WITH REFLEX SENT TO LAB? Not applicable  IF NO, COLLECT AND SEND PRIOR TO TRANSPORT TO INPATIENT AREA    INTEGUMENTARY:   IS THE PATIENT UNDRESSED? Yes  ARE THERE WOUNDS PRESENT?  Yes  right great toe red swelling and drainage  ARE THE WOUNDS DOCUMENTED? Yes    RESTRAINTS IN USE: No      IS DOCUMENTATION COMPLETE: Yes  Is there a current Order? No  When does it ? Not applicable      Vital Signs  Level of Consciousness: Alert (0) (23)  Temp: 97.4 °F (36.3 °C) (23)  Temp Source: Oral (23)  Pulse (Heart Rate): 65 (23)  Heart Rate Source: Monitor (23)  Cardiac Rhythm: Sinus Rhythm (23)  Resp Rate: 18 (23)  BP: (!) 115/45 (23)  MAP (Monitor): (!) 61 (23)  MAP (Calculated): 68 (23)  BP 1 Location: Left lower arm (23)  BP 1 Method: Automatic (23)  BP Patient Position: Supine (23)  MEWS Score: 2 (23)  Pain 1  Pain Scale 1: Numeric (0 - 10) (23)  Pain Intensity 1: 0 (23)  Patient Stated Pain Goal: 0 (23)  Pain Reassessment 1: Patient resting w/respiratory rate greater than 10 (23)      REVIEW:  Please call me if you have any questions.

## 2023-01-22 NOTE — PROGRESS NOTES
Hospitalist Progress Note    Subjective:   Daily Progress Note: 1/22/2023 11:17 AM    Hospital Course: 60 y.o.  female with pertinent past medical history of chronic hypoxemic respiratory failure on 4 L/min home oxygen therapy secondary to COPD, diabetes mellitus, essential hypertension, hyperlipidemia, JOSLYN compliant with CPAP, history duodenal ulcers, chronic anemia of indeterminate etiology who presents after routine lab work performed yesterday demonstrated hemoglobin of 5.3. Patient denies any sources of blood loss including hematochezia, melena, hematuria, hematemesis, epistaxis, bruising/bleeding. Subjective:pt seen in bed , discussed events related to admission .  Nurse requests diet order ,    Current Facility-Administered Medications   Medication Dose Route Frequency    albuterol-ipratropium (DUO-NEB) 2.5 MG-0.5 MG/3 ML  3 mL Nebulization Q4H PRN    atorvastatin (LIPITOR) tablet 10 mg  10 mg Oral QHS    metoprolol tartrate (LOPRESSOR) tablet 25 mg  25 mg Oral BID    pantoprazole (PROTONIX) tablet 40 mg  40 mg Oral ACB&D    sodium chloride (NS) flush 5-40 mL  5-40 mL IntraVENous Q8H    sodium chloride (NS) flush 5-40 mL  5-40 mL IntraVENous PRN    acetaminophen (TYLENOL) tablet 650 mg  650 mg Oral Q6H PRN    Or    acetaminophen (TYLENOL) suppository 650 mg  650 mg Rectal Q6H PRN    polyethylene glycol (MIRALAX) packet 17 g  17 g Oral DAILY PRN    ondansetron (ZOFRAN ODT) tablet 4 mg  4 mg Oral Q8H PRN    Or    ondansetron (ZOFRAN) injection 4 mg  4 mg IntraVENous Q6H PRN    0.9% sodium chloride infusion 250 mL  250 mL IntraVENous PRN    insulin lispro (HUMALOG) injection   SubCUTAneous AC&HS    glucose chewable tablet 16 g  4 Tablet Oral PRN    glucagon (GLUCAGEN) injection 1 mg  1 mg IntraMUSCular PRN    dextrose 10% infusion 0-250 mL  0-250 mL IntraVENous PRN    albuterol-ipratropium (DUO-NEB) 2.5 MG-0.5 MG/3 ML  3 mL Nebulization QID RT    balsam peru-castor oiL (VENELEX) ointment   Topical BID 0.9% sodium chloride infusion 250 mL  250 mL IntraVENous PRN        Review of Systems:    ROS     Objective:     Visit Vitals  BP (!) 112/39   Pulse 68   Temp 97.9 °F (36.6 °C)   Resp 19   Ht 5' 3\" (1.6 m)   Wt 128 kg (282 lb 3 oz)   SpO2 96%   Breastfeeding No   BMI 49.99 kg/m²    O2 Flow Rate (L/min): (S) 4 l/min (baseline) O2 Device: Nasal cannula    Temp (24hrs), Av °F (36.7 °C), Min:97.4 °F (36.3 °C), Max:98.2 °F (36.8 °C)      No intake/output data recorded.  1901 -  0700  In: 659 [I.V.:39]  Out: 100 [Urine:100]    PHYSICAL EXAM:    Physical Exam  Constitutional:       Appearance: She is obese. HENT:      Head: Normocephalic. Nose: Nose normal.      Mouth/Throat:      Mouth: Mucous membranes are moist.   Eyes:      Pupils: Pupils are equal, round, and reactive to light. Cardiovascular:      Rate and Rhythm: Normal rate and regular rhythm. Pulmonary:      Effort: Pulmonary effort is normal.   Abdominal:      General: Abdomen is flat. Tenderness: There is no abdominal tenderness. There is no guarding. Musculoskeletal:      Cervical back: Normal range of motion. Right lower leg: Edema present. Left lower leg: Edema present. Skin:     General: Skin is warm. Findings: Erythema present. Comments: See photo    Neurological:      Mental Status: She is alert.                 Data Review    Recent Results (from the past 24 hour(s))   CBC WITH AUTOMATED DIFF    Collection Time: 23  7:53 PM   Result Value Ref Range    WBC 9.3 3.6 - 11.0 K/uL    RBC 1.98 (L) 3.80 - 5.20 M/uL    HGB 5.3 (LL) 11.5 - 16.0 g/dL    HCT 19.8 (L) 35.0 - 47.0 %    .0 (H) 80.0 - 99.0 FL    MCH 26.8 26.0 - 34.0 PG    MCHC 26.8 (L) 30.0 - 36.5 g/dL    RDW 18.4 (H) 11.5 - 14.5 %    PLATELET 666 281 - 140 K/uL    MPV 11.2 8.9 - 12.9 FL    NRBC 0.6 (H) 0  WBC    ABSOLUTE NRBC 0.06 (H) 0.00 - 0.01 K/uL    NEUTROPHILS 71 32 - 75 %    LYMPHOCYTES 23 12 - 49 %    MONOCYTES 4 (L) 5 - 13 %    EOSINOPHILS 1 0 - 7 %    BASOPHILS 0 0 - 1 %    IMMATURE GRANULOCYTES 1 (H) 0.0 - 0.5 %    ABS. NEUTROPHILS 6.6 1.8 - 8.0 K/UL    ABS. LYMPHOCYTES 2.1 0.8 - 3.5 K/UL    ABS. MONOCYTES 0.4 0.0 - 1.0 K/UL    ABS. EOSINOPHILS 0.1 0.0 - 0.4 K/UL    ABS. BASOPHILS 0.0 0.0 - 0.1 K/UL    ABS. IMM. GRANS. 0.1 (H) 0.00 - 0.04 K/UL    DF SMEAR SCANNED      PLATELET COMMENTS Large Platelets      RBC COMMENTS ANISOCYTOSIS  1+        RBC COMMENTS MACROCYTOSIS  1+        RBC COMMENTS HYPOCHROMIA  1+        RBC COMMENTS POLYCHROMASIA  PRESENT        RBC COMMENTS STOMATOCYTES     METABOLIC PANEL, COMPREHENSIVE    Collection Time: 01/21/23  7:53 PM   Result Value Ref Range    Sodium 140 136 - 145 mmol/L    Potassium 4.4 3.5 - 5.1 mmol/L    Chloride 95 (L) 97 - 108 mmol/L    CO2 42 (HH) 21 - 32 mmol/L    Anion gap 3 (L) 5 - 15 mmol/L    Glucose 152 (H) 65 - 100 mg/dL    BUN 17 6 - 20 MG/DL    Creatinine 0.89 0.55 - 1.02 MG/DL    BUN/Creatinine ratio 19 12 - 20      eGFR >60 >60 ml/min/1.73m2    Calcium 8.3 (L) 8.5 - 10.1 MG/DL    Bilirubin, total 0.3 0.2 - 1.0 MG/DL    ALT (SGPT) 27 12 - 78 U/L    AST (SGOT) 52 (H) 15 - 37 U/L    Alk.  phosphatase 97 45 - 117 U/L    Protein, total 7.2 6.4 - 8.2 g/dL    Albumin 2.0 (L) 3.5 - 5.0 g/dL    Globulin 5.2 (H) 2.0 - 4.0 g/dL    A-G Ratio 0.4 (L) 1.1 - 2.2     TYPE & SCREEN    Collection Time: 01/21/23  7:53 PM   Result Value Ref Range    Crossmatch Expiration 01/24/2023,2359     ABO/Rh(D) O POSITIVE     Antibody screen NEG     Unit number [id number]     Blood component type RC LR     Unit division 00     Status of unit TRANSFUSED     Crossmatch result Compatible     Unit number [id number]     Blood component type RC LR     Unit division 00     Status of unit ISSUED     Crossmatch result Compatible    POC LACTIC ACID    Collection Time: 01/21/23  8:01 PM   Result Value Ref Range    Lactic Acid (POC) 3.86 (HH) 0.40 - 2.00 mmol/L   RBC, ALLOCATE    Collection Time: 01/21/23  8:30 PM   Result Value Ref Range    HISTORY CHECKED? Historical check performed    OCCULT BLOOD, STOOL    Collection Time: 01/21/23 10:30 PM   Result Value Ref Range    Occult blood, stool Negative NEG     POC LACTIC ACID    Collection Time: 01/21/23 10:32 PM   Result Value Ref Range    Lactic Acid (POC) 3.31 (HH) 0.40 - 2.00 mmol/L   LACTIC ACID    Collection Time: 01/22/23 12:33 AM   Result Value Ref Range    Lactic acid 2.4 (HH) 0.4 - 2.0 MMOL/L   METABOLIC PANEL, BASIC    Collection Time: 01/22/23 12:33 AM   Result Value Ref Range    Sodium 139 136 - 145 mmol/L    Potassium 4.6 3.5 - 5.1 mmol/L    Chloride 96 (L) 97 - 108 mmol/L    CO2 42 (HH) 21 - 32 mmol/L    Anion gap 1 (L) 5 - 15 mmol/L    Glucose 129 (H) 65 - 100 mg/dL    BUN 17 6 - 20 MG/DL    Creatinine 0.87 0.55 - 1.02 MG/DL    BUN/Creatinine ratio 20 12 - 20      eGFR >60 >60 ml/min/1.73m2    Calcium 7.9 (L) 8.5 - 10.1 MG/DL   CBC WITH AUTOMATED DIFF    Collection Time: 01/22/23 12:33 AM   Result Value Ref Range    WBC 10.4 3.6 - 11.0 K/uL    RBC 2.14 (L) 3.80 - 5.20 M/uL    HGB 5.8 (LL) 11.5 - 16.0 g/dL    HCT 20.7 (L) 35.0 - 47.0 %    MCV 96.7 80.0 - 99.0 FL    MCH 27.1 26.0 - 34.0 PG    MCHC 28.0 (L) 30.0 - 36.5 g/dL    RDW 17.5 (H) 11.5 - 14.5 %    PLATELET 183 485 - 103 K/uL    MPV 10.9 8.9 - 12.9 FL    NRBC 0.4 (H) 0  WBC    ABSOLUTE NRBC 0.04 (H) 0.00 - 0.01 K/uL    NEUTROPHILS 72 32 - 75 %    LYMPHOCYTES 20 12 - 49 %    MONOCYTES 7 5 - 13 %    EOSINOPHILS 0 0 - 7 %    BASOPHILS 0 0 - 1 %    IMMATURE GRANULOCYTES 1 (H) 0.0 - 0.5 %    ABS. NEUTROPHILS 7.5 1.8 - 8.0 K/UL    ABS. LYMPHOCYTES 2.1 0.8 - 3.5 K/UL    ABS. MONOCYTES 0.7 0.0 - 1.0 K/UL    ABS. EOSINOPHILS 0.0 0.0 - 0.4 K/UL    ABS. BASOPHILS 0.0 0.0 - 0.1 K/UL    ABS. IMM.  GRANS. 0.1 (H) 0.00 - 0.04 K/UL    DF AUTOMATED      RBC COMMENTS ANISOCYTOSIS  1+        RBC COMMENTS HYPOCHROMIA  3+        RBC COMMENTS ROULEAUX  PRESENT        RBC COMMENTS STOMATOCYTES  PRESENT        RBC COMMENTS BASOPHILIC STIPPLING  PRESENT        RBC COMMENTS POLYCHROMASIA  1+       SAMPLES BEING HELD    Collection Time: 01/22/23 12:33 AM   Result Value Ref Range    SAMPLES BEING HELD LV     COMMENT        Add-on orders for these samples will be processed based on acceptable specimen integrity and analyte stability, which may vary by analyte. URINALYSIS W/ REFLEX CULTURE    Collection Time: 01/22/23 12:36 AM    Specimen: Urine   Result Value Ref Range    Color YELLOW/STRAW      Appearance TURBID (A) CLEAR      Specific gravity 1.027      pH (UA) 5.5 5.0 - 8.0      Protein TRACE (A) NEG mg/dL    Glucose Negative NEG mg/dL    Ketone Negative NEG mg/dL    Bilirubin Negative NEG      Blood Negative NEG      Urobilinogen 1.0 0.2 - 1.0 EU/dL    Nitrites Positive (A) NEG      Leukocyte Esterase SMALL (A) NEG      WBC 5-10 0 - 4 /hpf    RBC 0-5 0 - 5 /hpf    Epithelial cells MODERATE (A) FEW /lpf    Bacteria 2+ (A) NEG /hpf    UA:UC IF INDICATED CULTURE NOT INDICATED BY UA RESULT CNI      Amorphous Crystals 3+ (A) NEG   RBC, ALLOCATE    Collection Time: 01/22/23  1:30 AM   Result Value Ref Range    HISTORY CHECKED? Historical check performed    HGB & HCT    Collection Time: 01/22/23  6:11 AM   Result Value Ref Range    HGB 7.0 (L) 11.5 - 16.0 g/dL    HCT 23.8 (L) 35.0 - 47.0 %   GLUCOSE, POC    Collection Time: 01/22/23  7:37 AM   Result Value Ref Range    Glucose (POC) 118 (H) 65 - 117 mg/dL    Performed by Abelardo 40 ACID    Collection Time: 01/22/23  7:41 AM   Result Value Ref Range    Lactic acid 1.9 0.4 - 2.0 MMOL/L       XR CHEST PORT   Final Result      Diffuse hazy interstitial opacities suggestive of edema. Bandlike right upper   lobe atelectasis.                Active Problems:    Symptomatic anemia (1/22/2023)        Assessment/Plan:   Acute on chronic anemia-suspect GI bleeding  History bleeding duodenal ulcers/gastritis  EGD 6/2022 demonstrating: Antral ulcers, gastritis, duodenal erosions D1/D2 junction  Start twice daily PPI  Hgb 5.3 on admission s/p 2 units PRBC  now at 7.0   Will give additional 1 unit for potential procedure   Appreciate GI input no procedure today     Chronic hypoxemic respiratory failure  COPD  Continuous pulse oximetry and supplemental oxygen to maintain saturations greater than 90%  DuoNebs every 4 hours as needed  CXR shows diffuse hazy interstitial opacities suggestive of edema   On 4 L NC baseline 3L at home  concern for hypotension with diuresis   3+ pedal edema     JOSLYN compliant with CPAP  Nocturnal CPAP ordered per patient request     Paroxysmal atrial fibrillation reportedly on anticoagulation-medication unknown  CAD  Diastolic heart failure  Continue PTA atorvastatin, metoprolol  Will check Pro BNP in am   on Lasix at home holding for soft pressures right now     Hyperglycemia without diagnosis of diabetes mellitus  Sliding scale corrective coverage insulin   hemoglobin A1c < 3.8     Wounds   - skin folds friable and oozing   - wound care consult placed   - weeping Infected R great toe   - appreciate Podiatry input - will perform Avulsion       Obesity class III by BMI-49.99  Recommend medically assisted weight loss in outpatient setting      Disposition:   DVT Prophylaxis: SCDs  Code Status:  Full Code  POA:    Care Plan discussed with: patient and nursing staff   _______________________________________________________________    Faye Castillo.  Luis Alberto Rose NP

## 2023-01-23 LAB
ANION GAP SERPL CALC-SCNC: ABNORMAL MMOL/L (ref 5–15)
BASOPHILS # BLD: 0 K/UL (ref 0–0.1)
BASOPHILS NFR BLD: 0 % (ref 0–1)
BUN SERPL-MCNC: 19 MG/DL (ref 6–20)
BUN/CREAT SERPL: 23 (ref 12–20)
CALCIUM SERPL-MCNC: 8.5 MG/DL (ref 8.5–10.1)
CHLORIDE SERPL-SCNC: 92 MMOL/L (ref 97–108)
CO2 SERPL-SCNC: >45 MMOL/L (ref 21–32)
CREAT SERPL-MCNC: 0.84 MG/DL (ref 0.55–1.02)
DIFFERENTIAL METHOD BLD: ABNORMAL
EOSINOPHIL # BLD: 0.1 K/UL (ref 0–0.4)
EOSINOPHIL NFR BLD: 1 % (ref 0–7)
ERYTHROCYTE [DISTWIDTH] IN BLOOD BY AUTOMATED COUNT: 16.5 % (ref 11.5–14.5)
FERRITIN SERPL-MCNC: 32 NG/ML (ref 8–252)
FOLATE SERPL-MCNC: 3.8 NG/ML (ref 5–21)
GLUCOSE BLD STRIP.AUTO-MCNC: 105 MG/DL (ref 65–117)
GLUCOSE BLD STRIP.AUTO-MCNC: 124 MG/DL (ref 65–117)
GLUCOSE BLD STRIP.AUTO-MCNC: 126 MG/DL (ref 65–117)
GLUCOSE BLD STRIP.AUTO-MCNC: 131 MG/DL (ref 65–117)
GLUCOSE SERPL-MCNC: 111 MG/DL (ref 65–100)
HCT VFR BLD AUTO: 26.8 % (ref 35–47)
HGB BLD-MCNC: 7.6 G/DL (ref 11.5–16)
IMM GRANULOCYTES # BLD AUTO: 0.1 K/UL (ref 0–0.04)
IMM GRANULOCYTES NFR BLD AUTO: 1 % (ref 0–0.5)
IRON SATN MFR SERPL: 9 % (ref 20–50)
IRON SERPL-MCNC: 31 UG/DL (ref 35–150)
LDH SERPL L TO P-CCNC: 378 U/L (ref 81–246)
LYMPHOCYTES # BLD: 1.6 K/UL (ref 0.8–3.5)
LYMPHOCYTES NFR BLD: 15 % (ref 12–49)
MCH RBC QN AUTO: 28.3 PG (ref 26–34)
MCHC RBC AUTO-ENTMCNC: 28.4 G/DL (ref 30–36.5)
MCV RBC AUTO: 99.6 FL (ref 80–99)
MONOCYTES # BLD: 0.5 K/UL (ref 0–1)
MONOCYTES NFR BLD: 5 % (ref 5–13)
NEUTS SEG # BLD: 8.6 K/UL (ref 1.8–8)
NEUTS SEG NFR BLD: 78 % (ref 32–75)
NRBC # BLD: 0.04 K/UL (ref 0–0.01)
NRBC BLD-RTO: 0.4 PER 100 WBC
PLATELET # BLD AUTO: 244 K/UL (ref 150–400)
PMV BLD AUTO: 10.9 FL (ref 8.9–12.9)
POTASSIUM SERPL-SCNC: 4 MMOL/L (ref 3.5–5.1)
RBC # BLD AUTO: 2.69 M/UL (ref 3.8–5.2)
RBC MORPH BLD: ABNORMAL
RETICS # AUTO: 0.16 M/UL (ref 0.02–0.08)
RETICS/RBC NFR AUTO: 5.8 % (ref 0.7–2.1)
SERVICE CMNT-IMP: ABNORMAL
SERVICE CMNT-IMP: NORMAL
SODIUM SERPL-SCNC: 138 MMOL/L (ref 136–145)
TIBC SERPL-MCNC: 328 UG/DL (ref 250–450)
VIT B12 SERPL-MCNC: 501 PG/ML (ref 193–986)
WBC # BLD AUTO: 10.9 K/UL (ref 3.6–11)

## 2023-01-23 PROCEDURE — 85045 AUTOMATED RETICULOCYTE COUNT: CPT

## 2023-01-23 PROCEDURE — 83615 LACTATE (LD) (LDH) ENZYME: CPT

## 2023-01-23 PROCEDURE — 84630 ASSAY OF ZINC: CPT

## 2023-01-23 PROCEDURE — 74011250636 HC RX REV CODE- 250/636: Performed by: INTERNAL MEDICINE

## 2023-01-23 PROCEDURE — 65270000046 HC RM TELEMETRY

## 2023-01-23 PROCEDURE — 94640 AIRWAY INHALATION TREATMENT: CPT

## 2023-01-23 PROCEDURE — 82962 GLUCOSE BLOOD TEST: CPT

## 2023-01-23 PROCEDURE — 80048 BASIC METABOLIC PNL TOTAL CA: CPT

## 2023-01-23 PROCEDURE — 74011250637 HC RX REV CODE- 250/637: Performed by: STUDENT IN AN ORGANIZED HEALTH CARE EDUCATION/TRAINING PROGRAM

## 2023-01-23 PROCEDURE — 74011000250 HC RX REV CODE- 250: Performed by: STUDENT IN AN ORGANIZED HEALTH CARE EDUCATION/TRAINING PROGRAM

## 2023-01-23 PROCEDURE — 94761 N-INVAS EAR/PLS OXIMETRY MLT: CPT

## 2023-01-23 PROCEDURE — 77010033678 HC OXYGEN DAILY

## 2023-01-23 PROCEDURE — 83540 ASSAY OF IRON: CPT

## 2023-01-23 PROCEDURE — 76937 US GUIDE VASCULAR ACCESS: CPT

## 2023-01-23 PROCEDURE — 82607 VITAMIN B-12: CPT

## 2023-01-23 PROCEDURE — 82784 ASSAY IGA/IGD/IGG/IGM EACH: CPT

## 2023-01-23 PROCEDURE — 74011250637 HC RX REV CODE- 250/637: Performed by: INTERNAL MEDICINE

## 2023-01-23 PROCEDURE — 94660 CPAP INITIATION&MGMT: CPT

## 2023-01-23 PROCEDURE — 36415 COLL VENOUS BLD VENIPUNCTURE: CPT

## 2023-01-23 PROCEDURE — 85025 COMPLETE CBC W/AUTO DIFF WBC: CPT

## 2023-01-23 PROCEDURE — 82728 ASSAY OF FERRITIN: CPT

## 2023-01-23 PROCEDURE — 82525 ASSAY OF COPPER: CPT

## 2023-01-23 PROCEDURE — 82746 ASSAY OF FOLIC ACID SERUM: CPT

## 2023-01-23 RX ORDER — ACETAZOLAMIDE 250 MG/1
250 TABLET ORAL ONCE
Status: COMPLETED | OUTPATIENT
Start: 2023-01-23 | End: 2023-01-23

## 2023-01-23 RX ORDER — FUROSEMIDE 10 MG/ML
40 INJECTION INTRAMUSCULAR; INTRAVENOUS ONCE
Status: COMPLETED | OUTPATIENT
Start: 2023-01-23 | End: 2023-01-23

## 2023-01-23 RX ADMIN — PANTOPRAZOLE SODIUM 40 MG: 40 TABLET, DELAYED RELEASE ORAL at 17:33

## 2023-01-23 RX ADMIN — CASTOR OIL AND BALSAM, PERU: 788; 87 OINTMENT TOPICAL at 08:03

## 2023-01-23 RX ADMIN — FUROSEMIDE 40 MG: 10 INJECTION, SOLUTION INTRAMUSCULAR; INTRAVENOUS at 11:37

## 2023-01-23 RX ADMIN — IPRATROPIUM BROMIDE AND ALBUTEROL SULFATE 3 ML: 2.5; .5 SOLUTION RESPIRATORY (INHALATION) at 16:10

## 2023-01-23 RX ADMIN — CASTOR OIL AND BALSAM, PERU: 788; 87 OINTMENT TOPICAL at 22:14

## 2023-01-23 RX ADMIN — ATORVASTATIN CALCIUM 10 MG: 10 TABLET, FILM COATED ORAL at 22:13

## 2023-01-23 RX ADMIN — IPRATROPIUM BROMIDE AND ALBUTEROL SULFATE 3 ML: 2.5; .5 SOLUTION RESPIRATORY (INHALATION) at 09:03

## 2023-01-23 RX ADMIN — ACETAMINOPHEN 650 MG: 325 TABLET ORAL at 01:36

## 2023-01-23 RX ADMIN — IPRATROPIUM BROMIDE AND ALBUTEROL SULFATE 3 ML: 2.5; .5 SOLUTION RESPIRATORY (INHALATION) at 20:48

## 2023-01-23 RX ADMIN — SODIUM CHLORIDE, PRESERVATIVE FREE 10 ML: 5 INJECTION INTRAVENOUS at 22:13

## 2023-01-23 RX ADMIN — ACETAZOLAMIDE 250 MG: 250 TABLET ORAL at 17:33

## 2023-01-23 RX ADMIN — SODIUM CHLORIDE, PRESERVATIVE FREE 10 ML: 5 INJECTION INTRAVENOUS at 05:58

## 2023-01-23 RX ADMIN — PANTOPRAZOLE SODIUM 40 MG: 40 TABLET, DELAYED RELEASE ORAL at 08:01

## 2023-01-23 RX ADMIN — SODIUM CHLORIDE, PRESERVATIVE FREE 10 ML: 5 INJECTION INTRAVENOUS at 17:45

## 2023-01-23 NOTE — PROGRESS NOTES
Gastroenterology Daily Progress Note   COREEN Middleton for Dr. Edita Sewell)   [facility]    Admit Date: 1/21/2023     Follow up of anemia    Subjective:       No melena, hematochezia or hematemesis/coffee ground emesis  Stool heme negative  Was on Xarelto PTA  No NSAID use, was not on PPI PTA  Presented with macrocytic anemia, mcv 100 on admission  Iron, b12, folate not done prior to transfusion but normal back in June  Has never had a colonoscopy, No FH of CRC  CO2 42, has sleep apnea, BMI 49.99, CXR shows pulmonary edema  Is bedbound  Recalls about 1-2 years ago was having a procedure with anesthesia and it had to be stopped because her \"heart stopped\"  On 4L nasal cannula, gets SOB with talking  O2 sat 75-87's on CPAP    Current Facility-Administered Medications   Medication Dose Route Frequency    albuterol-ipratropium (DUO-NEB) 2.5 MG-0.5 MG/3 ML  3 mL Nebulization Q4H PRN    atorvastatin (LIPITOR) tablet 10 mg  10 mg Oral QHS    metoprolol tartrate (LOPRESSOR) tablet 25 mg  25 mg Oral BID    pantoprazole (PROTONIX) tablet 40 mg  40 mg Oral ACB&D    sodium chloride (NS) flush 5-40 mL  5-40 mL IntraVENous Q8H    sodium chloride (NS) flush 5-40 mL  5-40 mL IntraVENous PRN    acetaminophen (TYLENOL) tablet 650 mg  650 mg Oral Q6H PRN    Or    acetaminophen (TYLENOL) suppository 650 mg  650 mg Rectal Q6H PRN    polyethylene glycol (MIRALAX) packet 17 g  17 g Oral DAILY PRN    ondansetron (ZOFRAN ODT) tablet 4 mg  4 mg Oral Q8H PRN    Or    ondansetron (ZOFRAN) injection 4 mg  4 mg IntraVENous Q6H PRN    0.9% sodium chloride infusion 250 mL  250 mL IntraVENous PRN    insulin lispro (HUMALOG) injection   SubCUTAneous AC&HS    glucose chewable tablet 16 g  4 Tablet Oral PRN    glucagon (GLUCAGEN) injection 1 mg  1 mg IntraMUSCular PRN    dextrose 10% infusion 0-250 mL  0-250 mL IntraVENous PRN    albuterol-ipratropium (DUO-NEB) 2.5 MG-0.5 MG/3 ML  3 mL Nebulization QID RT    balsam peru-castor oiL (VENELEX) ointment   Topical BID    0.9% sodium chloride infusion 250 mL  250 mL IntraVENous PRN    0.9% sodium chloride infusion 250 mL  250 mL IntraVENous PRN        Objective:     Visit Vitals  BP (!) 123/56 (BP 1 Location: Left lower arm, BP Patient Position: Supine)   Pulse 83   Temp 97.7 °F (36.5 °C)   Resp 18   Ht 5' 3\" (1.6 m)   Wt 128 kg (282 lb 3 oz)   SpO2 (!) 87%   Breastfeeding No   BMI 49.99 kg/m²   Blood pressure (!) 123/56, pulse 83, temperature 97.7 °F (36.5 °C), resp. rate 18, height 5' 3\" (1.6 m), weight 128 kg (282 lb 3 oz), SpO2 (!) 87 %, not currently breastfeeding. No intake/output data recorded. 01/21 1901 - 01/23 0700  In: 1550.6 [P.O.:242; I.V.:39]  Out: 550 [Urine:550]      Intake/Output Summary (Last 24 hours) at 1/23/2023 0859  Last data filed at 1/22/2023 1954  Gross per 24 hour   Intake 891.58 ml   Output 450 ml   Net 441.58 ml         Physical Exam:       General: morbidly obese white female with thick neck laying in bed in nad  Chest:  CTA, No rhonchi, rales or rubs. No use of accessory muscles. Heart: S1, S2, RRR  GI: Morbidly obese, Soft, NT, ND + bowel sounds  Extremities: 3+ pitting edema bilaterally  CNS: CN II-XII normal.      Labs:       Recent Results (from the past 24 hour(s))   GLUCOSE, POC    Collection Time: 01/22/23 12:17 PM   Result Value Ref Range    Glucose (POC) 118 (H) 65 - 117 mg/dL    Performed by Agus LAU    RBC, ALLOCATE    Collection Time: 01/22/23  1:15 PM   Result Value Ref Range    HISTORY CHECKED?  Historical check performed    GLUCOSE, POC    Collection Time: 01/22/23  4:42 PM   Result Value Ref Range    Glucose (POC) 116 65 - 117 mg/dL    Performed by Chika Isabel RN    NT-PRO BNP    Collection Time: 01/22/23  6:58 PM   Result Value Ref Range    NT pro- (H) <125 PG/ML   HGB & HCT    Collection Time: 01/22/23  6:58 PM   Result Value Ref Range    HGB 8.1 (L) 11.5 - 16.0 g/dL    HCT 28.2 (L) 35.0 - 47.0 %   GLUCOSE, POC Collection Time: 01/22/23 10:20 PM   Result Value Ref Range    Glucose (POC) 129 (H) 65 - 117 mg/dL    Performed by Leticia Stanford PCT    GLUCOSE, POC    Collection Time: 01/23/23  6:14 AM   Result Value Ref Range    Glucose (POC) 124 (H) 65 - 117 mg/dL    Performed by Leticia Stanford PCT    LABRCNT(wbc:2,hgb:2,hct:2,plt:2,)  Recent Labs     01/22/23  0033 01/21/23 1953    140   K 4.6 4.4   CL 96* 95*   CO2 42* 42*   BUN 17 17   CREA 0.87 0.89   * 152*   CA 7.9* 8.3*   LABRCNT(sgot:3,gpt:3,ap:3,tbiL:3,TP:3,ALB:3,GLOB:3,ggt:3,aml:3,amyp:3,lpse:3,hlpse:3)No results for input(s): INR, PTP, APTT, INREXT in the last 72 hours. Recent Labs     01/21/23 1953   AP 97   TP 7.2   ALB 2.0*   GLOB 5.2*   BRIEFLAB(B12,FOL,FOLAT,RBCF)  Lab Results   Component Value Date/Time    Folate 5.9 06/03/2022 03:57 AM   LABRCNT(CPK:3,CpKMB:3,ckndx:3,troiq:3)No components found for: GLPOCBRIEFLAB(CHOL,CHOLX,CHOLP,CHLST,CHOLV,HDL,HDLC,HDLP,LDL,DLDL,LDLC,DLDLP,TGL,TGLX,TRIGL,TRIGP,CHHD,CHHDX)No results for input(s): PH, PCO2, PO2 in the last 72 hours. LABRCNT(CPK:3,CpKMB:3,ckndx:3,troiq:3)COREEN Garcia  No results for input(s): CPK, CKNDX, TROIQ in the last 72 hours. 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 However, she is extremely high risk for anesthesia complications due to her morbid obesity,  CO2 retention, JOSLYN, COPD, pulmonary edema, chronic respiratory failure/hypoxemia.   She may have undiagnosed pulmonary HTN as well as her last echo was a poor study due to body habitus. Her anemia is macrocytic which suggests against ESTELA/GIB loss and her stool was heme negative (which is very sensitive) despite being on anticoagulation. Therefore, rather than subjecting patient to risky procedures which may be low yield, we recommend evaluation from hematology.           COREEN Ozuna    1/23/2023 [address] [facility], [address] [place] [address] [id number]  [address] [place] [address] South: [phone]

## 2023-01-23 NOTE — PROGRESS NOTES
End of Shift Note    Bedside shift change report given to Keitha Spatz, RN (oncoming nurse) by Niecy Bassett RN (offgoing nurse). Report included the following information SBAR, Kardex, and Recent Results    Shift worked: night   Shift summary and any significant changes:    Uneventful night. Patient received blood transfusion in the ED and then received 1 unit on the floor yesterday, H&H and ProBNP drawn. Call bell and phone within reach of patient, able to make needs known.        Concerns for physician to address: none   Zone phone for oncoming shift:  [phone]     Patient Information  Merlyn Yeh  60 y.o.  1/21/2023  7:27 PM by Micaela Smith was admitted from Home    Problem List  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    Sciatica 04/21/2022    Hypoxia 04/21/2022    COPD exacerbation ([address] 04/21/2022    CHF exacerbation ([address] 04/21/2022    SOB (shortness of breath) 12/04/2020    Physical deconditioning 12/04/2020    Counseling regarding advance care planning and goals of care 12/04/2020    Atrial flutter with rapid ventricular response ([address] 11/20/2020    JOSLYN (obstructive sleep apnea) 11/20/2020    Acute on chronic respiratory failure with hypoxia and hypercapnia ([address] 11/20/2020    Acute respiratory failure ([address] 11/18/2020    Mixed hyperlipidemia 11/15/2020    Morbid obesity ([address] 11/15/2020    A-fib ([address] 11/04/2020    S/P cardiac cath 11/04/2020    COVID-19 ruled out 07/24/2020     Past Medical History:   Diagnosis Date    Acute on chronic respiratory failure with hypoxia and hypercapnia ([address] 11/20/2020    Atrial fibrillation ([address] 11/4/2020    CHF (congestive heart failure) (HCC)     Diabetes ([facility]     Hypertension     Ill-defined condition     high cholesterol    Ill-defined condition     tachycardia    JOSLYN (obstructive sleep apnea) 11/20/2020    Other and unspecified symptoms and signs involving general sensations and perceptions     ruptured disc    S/P cardiac cath 11/4/2020    11/3/2020 nonobstructive disease       Core Measures:  CVA: No No  CHF:Yes Yes  PNA:No No    Activity:  Activity Level: Bed Rest  Number times ambulated in hallways past shift: 0  Number of times OOB to chair past shift: 0    Cardiac:   Cardiac Monitoring: Yes      Cardiac Rhythm: Sinus Rhythm    Access:   Current line(s): PIV   Central Line? No    Genitourinary:   Urinary status: voiding and incontinent  Urinary Catheter? No     Respiratory:   O2 Device: CPAP mask  Chronic home O2 use?: YES  Incentive spirometer at bedside: NO       GI:  Last Bowel Movement Date: 01/20/23  Current diet:  ADULT DIET Dysphagia - Minced & Moist; 5 carb choices (75 gm/meal); meat  DIET ONE TIME MESSAGE  Passing flatus: YES  Tolerating current diet: YES       Pain Management:   Patient states pain is manageable on current regimen: YES    Skin:  Gaston Score: 12  Interventions: float heels, increase time out of bed, PT/OT consult, and internal/external urinary devices    Patient Safety:  Fall Score: Total Score: 2  Interventions: bed/chair alarm, assistive device (walker, cane, etc), gripper socks, and pt to call before getting OOB      DVT prophylaxis:  DVT prophylaxis Med- Yes  DVT prophylaxis SCD or SHANNON- No     Wounds: (If Applicable)  Wounds- Yes  Location sacrum and R large toe    Active Consults:  IP CONSULT TO GASTROENTEROLOGY  IP CONSULT TO PODIATRY    Length of Stay:  Expected LOS: - - -  Actual LOS: 2  Discharge Plan: Yes When medically stable.       Carlos Sands RN

## 2023-01-23 NOTE — CONSULTS
Pulmonary, Critical Care, and Sleep Medicine      Name: Melissa Coleman MRN: [id number]   : 1960 Hospital: [facility]   Date: 2023  Admission date: 2023 Hospital Day: 3       History:   Pt is unstable and acutely ill. Medical records and data reviewed. Pt seen in Consultation    Hospital Problems  Date Reviewed: 6/3/2022            Codes Class Noted POA    Symptomatic anemia ICD-10-CM: D64.9  ICD-9-CM: 285.9  2023 Unknown           IMPRESSION:   Acute chronic respiratory failure with Hypoxia and Hypercapnea; home O2, NIV  Volume overload and fluid retention in the context of morbid obesity. Asthma (vs COPD) not acutely exacerbated; normal eosinophils, nl proBNP  Former smoker  Suspected JOSLYN and /or OHS  (COPD OHS Thoracic Restrictive disease ) Restrictive disease from obesity J84.4 and E 66.8  Afib, refractory  DM   Non-ambulatory at baseline due to arthritis, Obesity, SOB ; bedbound; wear adult diapers  Prognosis poor   MOrbid Obesity Body mass index is 49.99 kg/m². RECOMMENDATIONS/PLAN:   Continue noninvasive ventilation or BiPAP while here   Agree with diuresis  Patient education   bronchial hygiene with respiratory therapy techniques, bronchodilators  DVT prophylaxis  Prescription drug management with home med reconciliation reviewed          [x] High complexity decision making was performed  [x] See my orders for details      Subjective/Initial History:     I was asked by Cornelius Ryan DO to see Melissa Coleman  a 60 y.o.    female in consultation for a chief complaint of respiratory failure    Excerpts from admission 2023 or consult notes as follows:     \"   Carl Neighbor is a 60 y.o.  female with pertinent past medical history of chronic hypoxemic respiratory failure on 4 L/min home oxygen therapy secondary to COPD, diabetes mellitus, essential hypertension, hyperlipidemia, JOSLYN compliant with CPAP, history duodenal ulcers, chronic anemia of indeterminate etiology who presents after routine lab work performed yesterday demonstrated hemoglobin of 5.3. Patient denies any sources of blood loss including hematochezia, melena, hematuria, hematemesis, epistaxis, bruising/bleeding. She reports she has had lower than normal blood pressures at home with systolics in the 83H when her baseline is in the 130s, but denies any other associated symptoms. Medication reconciliation does not list any anticoagulation, but patient reports she is taking a blood thinner-unsure which one. ROS otherwise negative. Patient denies tobacco, alcohol, or illicit drug use. In the ED, patient afebrile and hemodynamically stable saturating upper 90s on baseline 4 L/min home oxygen therapy. Labs demonstrate: WBC 9.3, hemoglobin 5.3, platelets 000, sodium 140, potassium 4.4, chloride 95, bicarb 42 (chronic), glucose 152, BUN 17, creatinine 0.89, lactic acid 3.86 improved to 2.4 with IV fluids and 1 unit packed red blood cells. Fecal occult blood negative. UA not reflexed to culture. CXR demonstrates diffuse hazy interstitial opacities suggestive of edema with bandlike right upper lobe atelectasis. \"    Patient poor historian. Patient sent home April 2022 with noninvasive ventilation. Patient thinks she is on CPAP. Now on BiPAP. She has never made it back to the office for clinic follow-up. [employer]. She does not recall having had a sleep study. At 1 point she weighed 310 pounds. She states she lost 50 pounds. Weight on admission 282 pounds. BMI 50 kg/m². Patient says she is bedbound. Daughter helps her at home. Daughter has back problems. Patient without any stomach pain. She is thirsty all the time. She has no teeth. Last saw Dr. William Wright in the office December 2020.     Echocardiogram April 2022 left ejection fraction 50 to 65%     PAST MEDICAL HISTORY:  Significant for AFib, CHF, diabetes, hypertension, obstructive sleep apnea, COPD    Allergies   Allergen Reactions    Cefepime Rash    Cefazolin Rash    Other Plant, Animal, Environmental Rash     Ivory soap caused rash on hands. MAR reviewed and pertinent medications noted or modified as needed       Current Facility-Administered Medications   Medication    albuterol-ipratropium (DUO-NEB) 2.5 MG-0.5 MG/3 ML    atorvastatin (LIPITOR) tablet 10 mg    metoprolol tartrate (LOPRESSOR) tablet 25 mg    pantoprazole (PROTONIX) tablet 40 mg    sodium chloride (NS) flush 5-40 mL    sodium chloride (NS) flush 5-40 mL    acetaminophen (TYLENOL) tablet 650 mg    Or    acetaminophen (TYLENOL) suppository 650 mg    polyethylene glycol (MIRALAX) packet 17 g    ondansetron (ZOFRAN ODT) tablet 4 mg    Or    ondansetron (ZOFRAN) injection 4 mg    insulin lispro (HUMALOG) injection    glucose chewable tablet 16 g    glucagon (GLUCAGEN) injection 1 mg    dextrose 10% infusion 0-250 mL    albuterol-ipratropium (DUO-NEB) 2.5 MG-0.5 MG/3 ML    balsam peru-castor oiL (VENELEX) ointment    0.9% sodium chloride infusion 250 mL      Patient PCP: Unknown, Provider  PMH:  has a past medical history of Acute on chronic respiratory failure with hypoxia and hypercapnia ([facility] (11/20/2020), Atrial fibrillation ([facility] (11/4/2020), CHF (congestive heart failure) ([facility] Diabetes ([facility] Hypertension, Ill-defined condition, Ill-defined condition, JOSLYN (obstructive sleep apnea) (11/20/2020), Other and unspecified symptoms and signs involving general sensations and perceptions, and S/P cardiac cath (11/4/2020). PSH:   has a past surgical history that includes pr compre ep eval abltj 3d mapg tx svt (N/A, 11/23/2020); pr icar catheter ablation arrhythmia add on (N/A, 11/23/2020); pr intracardiac electrophysiologic 3d mapping (N/A, 11/23/2020); pr compre electrophysiol xm w/left atrial pacng/rec (N/A, 11/23/2020); and pr programmed stimj & pacg after iv drug nfs (N/A, 11/23/2020). FHX: family history is not on file. SHX:  reports that she has quit smoking. She has never used smokeless tobacco. She reports that she does not drink alcohol and does not use drugs. ROS:A comprehensive review of systems was negative except for that written in the HPI. Objective:     Vital Signs: Telemetry:     Intake/Output:   Visit Vitals  BP (!) 122/46   Pulse 81   Temp 97.9 °F (36.6 °C)   Resp 20   Ht 5' 3\" (1.6 m)   Wt 128 kg (282 lb 3 oz)   SpO2 92%   Breastfeeding No   BMI 49.99 kg/m²       Temp (24hrs), Av.9 °F (36.6 °C), Min:97.7 °F (36.5 °C), Max:98.1 °F (36.7 °C)        O2 Device: Nasal cannula O2 Flow Rate (L/min): 4 l/min       Wt Readings from Last 4 Encounters:   23 128 kg (282 lb 3 oz)   06/15/22 136.8 kg (301 lb 9.6 oz)   22 135.5 kg (298 lb 11.6 oz)   22 134.7 kg (297 lb)          Intake/Output Summary (Last 24 hours) at 2023 1800  Last data filed at 2023 1342  Gross per 24 hour   Intake 2 ml   Output 1150 ml   Net -1148 ml       Last shift:      701 - 1900  In: -   Out: 1150 [WMWCM:0510]  Last 3 shifts: 1901 -  0700  In: 1550.6 [P.O.:242; I.V.:39]  Out: 550 [Urine:550]       Physical Exam:   General:  female; cooperative;  standby BIPAP/NIV; NC;  HEENT: NCAT, no dentition, lips and mucosa dry  Eyes: anicteric; conjunctiva clear  Neck: no nodes, no accessory MM use. Chest: no deformity, thick  Cardiac: regular rate, rhythm; no murmur  Lungs: distant breath sounds; no nwheezes, deisy,  no rhonchi  Abd: soft, NT, hypoactive BS, OBESE,  Ext: 2-3+ edema; no joint swelling;  No clubbing  : No riwin,   Neuro: fluent,  follows commands; generalized weakness  Psych- no agitation, oriented to person;   Skin: warm, dry, no cyanosis;   Pulses: 1-2+ Bilateral pedal, radial  Capillary: brisk; pale    Labs:    Recent Labs     23  1409 23  1858 23  0611 23  0033 23   WBC 10.9  --   --  10.4 9.3   HGB 7.6* 8.1* 7.0* 5.8* 5.3*     -- --  274 346     Recent Labs     01/23/23  1409 01/22/23  0741 01/22/23  0033 01/21/23 1953     --  139 140   K 4.0  --  4.6 4.4   CL 92*  --  96* 95*   CO2 >45*  --  42* 42*   *  --  129* 152*   BUN 19  --  17 17   CREA 0.84  --  0.87 0.89   CA 8.5  --  7.9* 8.3*   LAC  --  1.9 2.4*  --    ALB  --   --   --  2.0*   ALT  --   --   --  27     No results for input(s): PH, PCO2, PO2, HCO3, FIO2 in the last 72 hours. No results for input(s): CPK, CKNDX, TROIQ in the last 72 hours. No lab exists for component: CPKMB  Lab Results   Component Value Date/Time    BNP 14 04/19/2009 05:00 PM      Lab Results   Component Value Date/Time    Culture result: (A) 01/21/2023 07:45 PM     PROBABLE Staphylococcus aureus SENSITIVITY TO FOLLOW GROWING IN 1 OF 1 BOTTLES DRAWN SITE = RA    Culture result: MRSA NOT PRESENT 06/07/2022 03:44 PM    Culture result:  06/07/2022 03:44 PM     Screening of patient nares for MRSA is for surveillance purposes and, if positive, to facilitate isolation considerations in high risk settings. It is not intended for automatic decolonization interventions per se as regimens are not sufficiently effective to warrant routine use. Lab Results   Component Value Date/Time    TSH 1.88 09/08/2014 02:20 PM       Imaging:    CXR Results  (Last 48 hours)                 01/21/23 2031  XR CHEST PORT Final result    Impression:      Diffuse hazy interstitial opacities suggestive of edema. Bandlike right upper   lobe atelectasis. Narrative:  EXAM:  XR CHEST PORT       INDICATION: Eval for pneumonia       COMPARISON: Chest radiograph 6/4/2022       TECHNIQUE: Supine portable chest AP view       FINDINGS:        Cardiomediastinal silhouette is stably enlarged. Diffuse hazy interstitial   opacities suggestive of edema. Bandlike right upper lobe atelectasis. No   definite pleural effusion or pneumothorax.                  Results from Hospital Encounter encounter on 01/21/23    XR CHEST PORT    Narrative  EXAM:  XR CHEST PORT    INDICATION: Eval for pneumonia    COMPARISON: Chest radiograph 6/4/2022    TECHNIQUE: Supine portable chest AP view    FINDINGS:    Cardiomediastinal silhouette is stably enlarged. Diffuse hazy interstitial  opacities suggestive of edema. Bandlike right upper lobe atelectasis. No  definite pleural effusion or pneumothorax. Impression  Diffuse hazy interstitial opacities suggestive of edema. Bandlike right upper  lobe atelectasis. Results from Hospital Encounter encounter on 05/23/22    XR CHEST PORT    Narrative  PORTABLE CHEST RADIOGRAPH/S: 6/4/2022 1:40 PM    INDICATION: Heart failure. COMPARISON: 6/2/2022, 5/23/2022, 5/3/2022. TECHNIQUE: Portable frontal semiupright radiograph/s of the chest.    FINDINGS:  A left PICC crosses midline and terminates in the right brachiocephalic or  internal jugular vein. There is pulmonary vascular congestion and probable  interstitial edema. The heart is enlarged, even given portable technique. The  central airways are patent. No pneumothorax. Impression  1. Left PICC crosses midline and terminates in the right brachiocephalic or IJV. 2. Cardiomegaly, pulmonary vascular congestion, probable interstitial edema. XR CHEST PORT    Narrative  EXAM: Portable CXR. 0949 hours. COMPARISON: 5/23/2022    INDICATION: short of breath    FINDINGS:  Lung volumes remain low. Bilateral nonspecific airspace disease/edema is  unchanged. Heart is borderline large. Left arm PICC line is stable. There is no  pneumothorax or midline shift. Impression  Stable disease. Results from [place] encounter on 05/23/22    CT HEAD WO CONT    Narrative  EXAM: CT HEAD WO CONT    INDICATION: 8/3/2020    COMPARISON: None. CONTRAST: None. TECHNIQUE: Unenhanced CT of the head was performed using 5 mm images. Brain and  bone windows were generated. Coronal and sagittal reformats.  CT dose reduction  was achieved through use of a standardized protocol tailored for this  examination and automatic exposure control for dose modulation. FINDINGS:  The ventricles and sulci are normal in size, shape and configuration. . There is  moderate white matter disease likely to chronic small vessel ischemic disease. .  There is no intracranial hemorrhage, extra-axial collection, or mass effect. The  basilar cisterns are open. No CT evidence of acute infarct. The bone windows demonstrate no abnormalities. The visualized portions of the  paranasal sinuses and mastoid air cells are clear. Impression  No evidence of acute process. Please note that this dictation was completed with [employer] ([employer]), the computer voice recognition software. Quite often unanticipated grammatical, syntax, homophones, and other interpretive errors are inadvertently transcribed by the computer software. Please disregard these errors. Please excuse any errors that have escaped final proofreading  ______________________________________________________________________      Thank you for allowing us to participate in the care of this patient.       This care involved high complexity medical decision making: I personally:  Reviewed the flowsheet and previous days notes  Reviewed and summarized records or history from previous days note or discussions with staff, family  High Risk Drug therapy requiring intensive monitoring for toxicity: eg steroids, antibiotics  Reviewed and/or ordered Clinical lab tests  Reviewed images and/or ordered Radiology tests  Reviewed and/or adjusted NiPPV settings  discussed my assessment/management with : Nursing, for coordination of care      Taunya Krabbe, MD

## 2023-01-23 NOTE — PROGRESS NOTES
Hospitalist Progress Note    Subjective:   Daily Progress Note: 1/23/2023 11:10 AM    Hospital Course: Rachell Ruffin is a 60 y.o.  female with pertinent past medical history of chronic hypoxemic respiratory failure on 4 L/min home oxygen therapy secondary to COPD, diabetes mellitus, essential hypertension, hyperlipidemia, JOSLYN compliant with CPAP, history duodenal ulcers, chronic anemia of indeterminate etiology who presents after routine lab work performed yesterday demonstrated hemoglobin of 5.3. Patient denies any sources of blood loss including hematochezia, melena, hematuria, hematemesis, epistaxis, bruising/bleeding. In the ED, patient afebrile and hemodynamically stable saturating upper 90s on baseline 4 L/min home oxygen therapy. Labs demonstrate: WBC 9.3, hemoglobin 5.3, platelets 799, sodium 140, potassium 4.4, chloride 95, bicarb 42 (chronic), glucose 152, BUN 17, creatinine 0.89, lactic acid 3.86 improved to 2.4 with IV fluids and 1 unit packed red blood cells. Fecal occult blood negative. UA not reflexed to culture. CXR demonstrates diffuse hazy interstitial opacities suggestive of edema with bandlike right upper lobe atelectasis. Patient was transfused with PRBC. GI consulted. GI recommends hematology evaluation. Deferred EGD at this time as patient doesn't have signs of overt bleeding and fecal occult blood test was negative. Subjective:  Patient reports shortness of breath. Assessment / Plan:  Acute on chronic anemia:  Low suspicion for GI bleed  Will monitor hb  Appreciate GI consult  Will consult hematology    Acute on chronic hypoxic respiratory failure  COPD  JOSLYN compliant with CPAP  Diastolic heart failure  Patient reports increasing shortness of breath and is dasaturating on 4L of supplemental oxygen which is her baseline.   Will give lasix 40 mg iv now and re evaluate since patient received iv fluids and blood transfusion  Continue supplemental oxygen and titrate as needed  Continue CPAP at night time  Outpatient pulmonary follow up. Type II DM:  Continue lispro sliding scale    Paroxysmal afib  CAD:  Continue statin, metoprolol  Aspirin held. Obesity:  Complicates all aspect of care    40 or above Morbid obesity / Body mass index is 49.99 kg/m².     Code status: Full  Prophylaxis: SCD's  Recommended Disposition: Home w/Family       Current Facility-Administered Medications   Medication Dose Route Frequency    furosemide (LASIX) injection 40 mg  40 mg IntraVENous ONCE    albuterol-ipratropium (DUO-NEB) 2.5 MG-0.5 MG/3 ML  3 mL Nebulization Q4H PRN    atorvastatin (LIPITOR) tablet 10 mg  10 mg Oral QHS    metoprolol tartrate (LOPRESSOR) tablet 25 mg  25 mg Oral BID    pantoprazole (PROTONIX) tablet 40 mg  40 mg Oral ACB&D    sodium chloride (NS) flush 5-40 mL  5-40 mL IntraVENous Q8H    sodium chloride (NS) flush 5-40 mL  5-40 mL IntraVENous PRN    acetaminophen (TYLENOL) tablet 650 mg  650 mg Oral Q6H PRN    Or    acetaminophen (TYLENOL) suppository 650 mg  650 mg Rectal Q6H PRN    polyethylene glycol (MIRALAX) packet 17 g  17 g Oral DAILY PRN    ondansetron (ZOFRAN ODT) tablet 4 mg  4 mg Oral Q8H PRN    Or    ondansetron (ZOFRAN) injection 4 mg  4 mg IntraVENous Q6H PRN    insulin lispro (HUMALOG) injection   SubCUTAneous AC&HS    glucose chewable tablet 16 g  4 Tablet Oral PRN    glucagon (GLUCAGEN) injection 1 mg  1 mg IntraMUSCular PRN    dextrose 10% infusion 0-250 mL  0-250 mL IntraVENous PRN    albuterol-ipratropium (DUO-NEB) 2.5 MG-0.5 MG/3 ML  3 mL Nebulization QID RT    balsam peru-castor oiL (VENELEX) ointment   Topical BID    0.9% sodium chloride infusion 250 mL  250 mL IntraVENous PRN        Review of Systems  Constitutional: No fevers, No chills, No sweats, No fatigue, No Weakness  Eyes: No redness  Ears, nose, mouth, throat, and face: No nasal congestion, No sore throat, No voice change  Respiratory: has  Shortness of Breath, No cough, No wheezing  Cardiovascular: No chest pain, No palpitations, No extremity edema  Gastrointestinal: No nausea, No vomiting, No diarrhea, No abdominal pain  Genitourinary: No frequency, No dysuria, No hematuria  Integument/breast: No skin lesion(s)   Neurological: No Confusion, No headaches, No dizziness      Objective:     Visit Vitals  BP (!) 123/56 (BP 1 Location: Left lower arm, BP Patient Position: Supine)   Pulse 83   Temp 97.7 °F (36.5 °C)   Resp 18   Ht 5' 3\" (1.6 m)   Wt 128 kg (282 lb 3 oz)   SpO2 (!) 89%   Breastfeeding No   BMI 49.99 kg/m²    O2 Flow Rate (L/min): 4 l/min O2 Device: Nasal cannula    Temp (24hrs), Av °F (36.7 °C), Min:97.5 °F (36.4 °C), Max:98.4 °F (36.9 °C)      No intake/output data recorded.  1901 -  0700  In: 1550.6 [P.O.:242; I.V.:39]  Out: 550 [Urine:550]    PHYSICAL EXAM:  Constitutional: No acute distress, morbidly obese. Skin: Extremities and face reveal no rashes. HEENT: Sclerae anicteric. Extra-occular muscles are intact. No oral ulcers. The neck is supple and no masses. Cardiovascular: Regular rate and rhythm. Respiratory: B/L decrease air entry  GI: Abdomen nondistended, soft, and nontender. Normal active bowel sounds. Musculoskeletal: No pitting edema of the lower legs. Able to move all ext. Dressing present on right big toe. B/L pitting edema  Neurological:  Patient is alert and oriented. Cranial nerves II-XII grossly intact  Psychiatric: Mood appears appropriate       Data Review    Recent Results (from the past 24 hour(s))   GLUCOSE, POC    Collection Time: 23 12:17 PM   Result Value Ref Range    Glucose (POC) 118 (H) 65 - 117 mg/dL    Performed by Sotero Chambers, [employer]    Collection Time: 23  1:15 PM   Result Value Ref Range    HISTORY CHECKED?  Historical check performed    GLUCOSE, POC    Collection Time: 23  4:42 PM   Result Value Ref Range    Glucose (POC) 116 65 - 117 mg/dL    Performed by Rufina Robison RN    NT-PRO BNP Collection Time: 01/22/23  6:58 PM   Result Value Ref Range    NT pro- (H) <125 PG/ML   HGB & HCT    Collection Time: 01/22/23  6:58 PM   Result Value Ref Range    HGB 8.1 (L) 11.5 - 16.0 g/dL    HCT 28.2 (L) 35.0 - 47.0 %   GLUCOSE, POC    Collection Time: 01/22/23 10:20 PM   Result Value Ref Range    Glucose (POC) 129 (H) 65 - 117 mg/dL    Performed by Adán Roy PCT    GLUCOSE, POC    Collection Time: 01/23/23  6:14 AM   Result Value Ref Range    Glucose (POC) 124 (H) 65 - 117 mg/dL    Performed by [facility] Roy PCT                  Time spent 35 minutes involving direct patient care as well as reviewing patient's labs and coordination of care with nursing staff     Care Plan discussed with: Patient/Family/RN/Case Management        Total time spent with patient: 35 minutes.

## 2023-01-23 NOTE — CONSULTS
[facility]    Name:  Reymundo Ely  MR#:  [id number]  :  1960  ACCOUNT #:  [de-identified]  DATE OF SERVICE:  2023    REFERRING PHYSICIAN:  Dr. Joel Salas:  Anemia. HISTORY OF PRESENT ILLNESS:  The patient is a 48-year-old white female who was admitted to the hospital with symptomatic anemia. She had had lab work done that showed her hemoglobin of 5.3. She came to the hospital.  She denied any bleeding. She was transfused. We are asked to see her for further evaluation. She is morbidly obese, has hard time getting around. We are asked to see her for further evaluation. PAST MEDICAL HISTORY:  Significant for AFib, CHF, diabetes, hypertension, obstructive sleep apnea, COPD. CURRENT MEDICATIONS:  She is on,  1. DuoNebs. 2.  Atorvastatin. 3.  Lasix. 4.  Humalog insulin. 5.  Lopressor. 6.  Protonix. ALLERGIES:  CEFEPIME, CEFAZOLIN. SOCIAL HISTORY:  She is a former smoker. She does not drink. FAMILY HISTORY:  Father had throat cancer. REVIEW OF SYSTEMS:  A 12-point systems done and negative except for as above. PHYSICAL EXAMINATION:  GENERAL:  Lying in bed in no acute distress. Morbidly obese. VITAL SIGNS:  Temperature 97.7, pulse 83, blood pressure 123/56, respirations 18, saturating 89% on 4 liters nasal cannula. NECK:  No cervical, supraclavicular or axillary lymphadenopathy appreciated. RESPIRATORY:  Clear to auscultation bilaterally. CARDIOVASCULAR:  Regular. ABDOMEN:  Soft, nontender. EXTREMITIES:  No edema. PSYCH:  Normal.  NEURO:  Nonfocal.    LABORATORY VALUES:  White blood cell count 10.4, platelets 631 on labs done yesterday. Hemoglobin this morning is 8.1. Chemistry, sodium 139. BUN 17, creatinine 0.87. Calcium 7.9. Total bili 0.3, ALT 27, AST 52, alk phos 97. IMPRESSION AND PLAN:  The patient is a 48-year-old white female who we are asked to see for anemia.   She is slightly macrocytic. I am going to check the lab work including iron studies, B12, folate, copper, zinc, LDH, and retic count, .  I would transfuse if her hemoglobin is less than 7. She responded appropriately to her transfusion. We will continue to follow along with you and make further recommendations based on the labs that return.         Marisela Bamberger, MD [id number]  D:  01/23/2023 10:39  T:  01/23/2023 12:57  JOB #:  [id number]

## 2023-01-23 NOTE — PROGRESS NOTES
Transition of Care Plan  RUR: 18%  DISPOSITION: The disposition plan is pending medical progression  F/U with PCP/Specialist    Transport: daughter vs TBD        Reason for Admission:   symptomatic anemia                     RUR Score:     18%             PCP: First and Last name:   Unable to recall      Name of Practice:    Are you a current patient: Yes/No:    Approximate date of last visit:    Can you participate in a virtual visit if needed:     Do you (patient/family) have any concerns for transition/discharge? None                 Plan for utilizing home health:   TBD    Current Advanced Directive/Advance Care Plan:  Full Code      Healthcare Decision Maker:   Click here to complete [address] including selection of the Healthcare Decision Maker Relationship (ie \"Primary\")            Primary Decision MakerFlissy Vazquez - [phone]    Secondary Decision Maker: Raphael Rodriguez -  - [phone]    Transition of Care Plan:          Patient lives with her daughter and requires full assistance with her ADLs/IADLs. Patient has been in the hospital a few times in May 2022 which resulted in patient going home with HHPT/OT [facility] and to [facility]. Care Management Interventions  PCP Verified by CM: Yes  Palliative Care Criteria Met (RRAT>21 & CHF Dx)?: No  Mode of Transport at Discharge:  Other (see comment) (daughter vs TBD )  Transition of Care Consult (CM Consult): Discharge Planning  MyChart Signup: No  Discharge Durable Medical Equipment: No  Health Maintenance Reviewed: Yes  Physical Therapy Consult: No  Occupational Therapy Consult: No  Speech Therapy Consult: No  Support Systems: Child(dejuan)  Confirm Follow Up Transport: Family  [employer] Information Provided?: No  Discharge Location  Patient Expects to be Discharged to[de-identified] Home with family assistance    2:30 PM  ZOILA Mccullough

## 2023-01-23 NOTE — PROGRESS NOTES
ADULT PROTOCOL: JET AEROSOL ASSESSMENT    Patient  Ara Sumner     60 y.o.   female     1/23/2023  9:04 AM    Breath Sounds Pre Procedure: Right Breath Sounds: Diminished                               Left Breath Sounds: Diminished    Breath Sounds Post Procedure: Right Breath Sounds: Clear                                 Left Breath Sounds: Clear    Breathing pattern: Pre procedure Breathing Pattern: Regular          Post procedure Breathing Pattern: Regular    Heart Rate: Pre procedure Pulse: 70           Post procedure Pulse: 75    Resp Rate: Pre procedure Respirations: 18           Post procedure Respirations: 18    Peak Flow: Pre bronchodilator             Post bronchodilator             Oxygen: O2 Device: Nasal cannula   Flow rate (L/min) 4     Changed: NO    SpO2: Pre procedure SpO2: 91 %   with oxygen              Post procedure SpO2: 92 %  with oxygen    Nebulizer Therapy: Current medications Aerosolized Medications: DuoNeb      Changed: NO  Problem List:   Patient Active Problem List   Diagnosis Code    COVID-19 ruled out Z20.822    [facility]) I48.91    S/P cardiac cath Z98.890    Mixed hyperlipidemia E78.2    Morbid obesity ([facility]) E66.01    Acute respiratory failure ([facility]) J96.00    Atrial flutter with rapid ventricular response ([facility]) I48.92    JOSLYN (obstructive sleep apnea) G47.33    Acute on chronic respiratory failure with hypoxia and hypercapnia ([facility]) J96.21, J96.22    SOB (shortness of breath) R06.02    Physical deconditioning R53.81    Counseling regarding advance care planning and goals of care Z71.89    Chronic pain G89.29    Dependence on supplemental oxygen Z99.81    Essential hypertension I10    Heart failure ([facility]) I50.9    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

## 2023-01-23 NOTE — WOUND CARE
Wound care consult for \"Wound recommendations\" which was changed to \"Patient has bleeding and weeping under pannus fold. She also has small possible DTI to her sacral area, a lot of hyperpigmentation to buttocks/hips. Stage 2 to L hip. \"    Pt. Is 60years old and admitted for low Hemoglobin at 5.3. she received 2 units of blood while in the ED. Her repeat Hemoglobin was 7.0. Pt. Was seen by podiatry yesterday and the Right great toenail was removed due to infected toenail from a cat scratch or bite to the toe. This is most likely not a permanent removal. The nail will grow back but the wound bed needs moist wound care for a few days.    Pt. Has incontinence Dermatitis on the buttocks and hips with ecchymosis and a DTI on the       Past Medical History:   Diagnosis Date    Acute on chronic respiratory failure with hypoxia and hypercapnia ([facility]) 11/20/2020    Atrial fibrillation ([facility]) 11/4/2020    CHF (congestive heart failure) ([facility])     Diabetes ([facility])     Hypertension     Ill-defined condition     high cholesterol    Ill-defined condition     tachycardia    JOSLYN (obstructive sleep apnea) 11/20/2020    Other and unspecified symptoms and signs involving general sensations and perceptions     ruptured disc    S/P cardiac cath 11/4/2020    11/3/2020 nonobstructive disease     Past Surgical History:   Procedure Laterality Date    NJ COMPRE ELECTROPHYSIOL XM W/LEFT ATRIAL PACNG/REC N/A 11/23/2020    Lt Atrial Pace & Record During Ep Study performed by Miguel A Cronin MD at [facility] CARDIAC CATH LAB    NJ COMPRE EP EVAL ABLTJ 3D MAPG TX SVT N/A 11/23/2020    ABLATION A-FLUTTER performed by Miguel A Cronin MD at [facility]. ADD ON N/A 11/23/2020    Ablation Svt/Vt Add On performed by Miguel A Cronin MD at [facility] CARDIAC CATH LAB    NJ INTRACARDIAC ELECTROPHYSIOLOGIC 3D MAPPING N/A 11/23/2020    Ep 3d Mapping performed by Miguel A Cronin MD at [facility] CARDIAC CATH LAB    NJ STIM/PACING HEART POST IV DRUG INFU N/A 11/23/2020    Drug Stimulation performed by Michaela Schwartz MD at [facility] CARDIAC CATH LAB     Assessment: The Pt. Prefers to lie flat on her back due to her fractured left Hip. She does need to be turned regularly.      WOUND POA CONDITIONS    Wound Buttocks Left Left buttocks DTI   surrounded by chronic skin changes: (Active)   Wound Image   01/23/23 1551   Wound Etiology Deep Tissue/Injury 01/23/23 1551   Cleansed Soap and water 01/23/23 1551   Dressing/Treatment Zinc paste 01/23/23 1551   Wound Length (cm) 2.5 cm 01/23/23 1551   Wound Width (cm) 1.5 cm 01/23/23 1551   Wound Depth (cm) 0 cm 01/23/23 1551   Wound Surface Area (cm^2) 3.75 cm^2 01/23/23 1551   Wound Volume (cm^3) 0 cm^3 01/23/23 1551   Wound Assessment Erythema;Purple/maroon 01/23/23 1551   Drainage Amount None 01/23/23 1551   Wound Odor None 01/23/23 1551   Edges Undefined edges 01/23/23 1551   Wound Thickness Description Partial thickness 01/23/23 1551   Number of days: 0       Wound Pelvis Anterior moisture skin damage under the pannus of the abdomen (Active)   Wound Image   01/23/23 1553   Wound Etiology Other (Comment) 01/23/23 1553   Wound Assessment Bleeding 01/23/23 1553   Drainage Amount Small 01/23/23 1553   Drainage Description Serosanguinous 01/23/23 1553   Wound Odor None 01/23/23 1553   Bonnie-Wound/Incision Assessment Blanchable erythema;Denuded 01/23/23 1553   Edges Undefined edges 01/23/23 1553   Wound Thickness Description Partial thickness 01/23/23 1553   Number of days: 0       Wound Toe (Comment  which one) Anterior;Right Toenail removed by Podiatry in the ED: (Active)   Wound Image    01/23/23 1555   Wound Etiology Surgical 01/23/23 1555   Dressing Status Clean 01/23/23 1555   Cleansed Cleansed with saline 01/23/23 1555   Wound Assessment Bleeding;Erythema;Granulation tissue;Pink/red 01/23/23 1555   Drainage Amount Small 01/23/23 1555   Drainage Description Serosanguinous 01/23/23 9107 Wound Odor None 01/23/23 1551   Bonnie-Wound/Incision Assessment Maceration 01/23/23 1557   Wound Thickness Description Full thickness 01/23/23 1554

## 2023-01-23 NOTE — PROGRESS NOTES
End of Shift Note    Bedside shift change report given to Gem Reynolds RN (oncoming nurse) by Brody Serrano RN (offgoing nurse). Report included the following information SBAR, Kardex, and Recent Results    Shift worked: 7a-7p   Shift summary and any significant changes:    New admission. Patient received blood transfusion in the ED and then received 1 unit on the floor. H&H and ProBNP drawn.        Concerns for physician to address: H&H results when available   Zone phone for oncoming shift:  [phone]     Patient Information  Ara Presser  60 y.o.  1/21/2023  7:27 PM by Ezra Grey was admitted from Home    Problem List  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    Sciatica 04/21/2022    Hypoxia 04/21/2022    COPD exacerbation ([address] 04/21/2022    CHF exacerbation ([address] 04/21/2022    SOB (shortness of breath) 12/04/2020    Physical deconditioning 12/04/2020    Counseling regarding advance care planning and goals of care 12/04/2020    Atrial flutter with rapid ventricular response ([address] 11/20/2020    JOSLYN (obstructive sleep apnea) 11/20/2020    Acute on chronic respiratory failure with hypoxia and hypercapnia ([address] 11/20/2020    Acute respiratory failure ([address] 11/18/2020    Mixed hyperlipidemia 11/15/2020    Morbid obesity ([address] 11/15/2020    A-fib ([address] 11/04/2020    S/P cardiac cath 11/04/2020    COVID-19 ruled out 07/24/2020     Past Medical History:   Diagnosis Date    Acute on chronic respiratory failure with hypoxia and hypercapnia ([address] 11/20/2020    Atrial fibrillation ([address] 11/4/2020    CHF (congestive heart failure) ([address]     Diabetes ([facility] Hypertension     Ill-defined condition     high cholesterol    Ill-defined condition     tachycardia    JOSLYN (obstructive sleep apnea) 11/20/2020    Other and unspecified symptoms and signs involving general sensations and perceptions     ruptured disc    S/P cardiac cath 11/4/2020    11/3/2020 nonobstructive disease       Core Measures:  CVA: No No  CHF:Yes Yes  PNA:No No    Activity:  Activity Level: Bed Rest  Number times ambulated in hallways past shift: 0  Number of times OOB to chair past shift: 0    Cardiac:   Cardiac Monitoring: Yes      Cardiac Rhythm: Sinus Courtney Carton    Access:   Current line(s): PIV   Central Line? No    Genitourinary:   Urinary status: voiding and incontinent  Urinary Catheter? No     Respiratory:   O2 Device: Nasal cannula  Chronic home O2 use?: YES  Incentive spirometer at bedside: NO       GI:  Last Bowel Movement Date: 01/20/23  Current diet:  ADULT DIET Dysphagia - Minced & Moist; 5 carb choices (75 gm/meal); meat  DIET ONE TIME MESSAGE  Passing flatus: YES  Tolerating current diet: YES       Pain Management:   Patient states pain is manageable on current regimen: YES    Skin:  Gaston Score: 11  Interventions: float heels, increase time out of bed, PT/OT consult, and internal/external urinary devices    Patient Safety:  Fall Score:    Interventions: bed/chair alarm, assistive device (walker, cane, etc), gripper socks, and pt to call before getting OOB      DVT prophylaxis:  DVT prophylaxis Med- Yes  DVT prophylaxis SCD or SHANNON- No     Wounds: (If Applicable)  Wounds- Yes  Location sacrum and R large toe    Active Consults:  IP CONSULT TO GASTROENTEROLOGY  IP CONSULT TO PODIATRY    Length of Stay:  Expected LOS: - - -  Actual LOS: 1  Discharge Plan: Yes When medically stable.       Dorothy St RN

## 2023-01-24 LAB
ANION GAP SERPL CALC-SCNC: ABNORMAL MMOL/L (ref 5–15)
BACTERIA SPEC CULT: ABNORMAL
BASOPHILS # BLD: 0 K/UL (ref 0–0.1)
BASOPHILS NFR BLD: 0 % (ref 0–1)
BUN SERPL-MCNC: 20 MG/DL (ref 6–20)
BUN/CREAT SERPL: 27 (ref 12–20)
CALCIUM SERPL-MCNC: 8.3 MG/DL (ref 8.5–10.1)
CHLORIDE SERPL-SCNC: 91 MMOL/L (ref 97–108)
CO2 SERPL-SCNC: >45 MMOL/L (ref 21–32)
COMMENT, HOLDF: NORMAL
CREAT SERPL-MCNC: 0.74 MG/DL (ref 0.55–1.02)
DIFFERENTIAL METHOD BLD: ABNORMAL
EOSINOPHIL # BLD: 0.1 K/UL (ref 0–0.4)
EOSINOPHIL NFR BLD: 1 % (ref 0–7)
ERYTHROCYTE [DISTWIDTH] IN BLOOD BY AUTOMATED COUNT: 16.2 % (ref 11.5–14.5)
GLUCOSE BLD STRIP.AUTO-MCNC: 109 MG/DL (ref 65–117)
GLUCOSE BLD STRIP.AUTO-MCNC: 153 MG/DL (ref 65–117)
GLUCOSE BLD STRIP.AUTO-MCNC: 94 MG/DL (ref 65–117)
GLUCOSE BLD STRIP.AUTO-MCNC: 96 MG/DL (ref 65–117)
GLUCOSE SERPL-MCNC: 105 MG/DL (ref 65–100)
HAPTOGLOB SERPL-MCNC: 197 MG/DL (ref 30–200)
HCT VFR BLD AUTO: 24.8 % (ref 35–47)
HCT VFR BLD AUTO: 31.3 % (ref 35–47)
HGB BLD-MCNC: 6.8 G/DL (ref 11.5–16)
HGB BLD-MCNC: 8.7 G/DL (ref 11.5–16)
HISTORY CHECKED?,CKHIST: NORMAL
IMM GRANULOCYTES # BLD AUTO: 0.2 K/UL (ref 0–0.04)
IMM GRANULOCYTES NFR BLD AUTO: 1 % (ref 0–0.5)
LYMPHOCYTES # BLD: 1.9 K/UL (ref 0.8–3.5)
LYMPHOCYTES NFR BLD: 16 % (ref 12–49)
MCH RBC QN AUTO: 28 PG (ref 26–34)
MCHC RBC AUTO-ENTMCNC: 27.4 G/DL (ref 30–36.5)
MCV RBC AUTO: 102.1 FL (ref 80–99)
MONOCYTES # BLD: 0.7 K/UL (ref 0–1)
MONOCYTES NFR BLD: 6 % (ref 5–13)
NEUTS SEG # BLD: 8.8 K/UL (ref 1.8–8)
NEUTS SEG NFR BLD: 76 % (ref 32–75)
NRBC # BLD: 0.04 K/UL (ref 0–0.01)
NRBC BLD-RTO: 0.3 PER 100 WBC
PLATELET # BLD AUTO: 229 K/UL (ref 150–400)
PMV BLD AUTO: 11.1 FL (ref 8.9–12.9)
POTASSIUM SERPL-SCNC: 3.8 MMOL/L (ref 3.5–5.1)
RBC # BLD AUTO: 2.43 M/UL (ref 3.8–5.2)
SAMPLES BEING HELD,HOLD: NORMAL
SERVICE CMNT-IMP: ABNORMAL
SERVICE CMNT-IMP: ABNORMAL
SERVICE CMNT-IMP: NORMAL
SODIUM SERPL-SCNC: 139 MMOL/L (ref 136–145)
WBC # BLD AUTO: 11.6 K/UL (ref 3.6–11)

## 2023-01-24 PROCEDURE — 74011250636 HC RX REV CODE- 250/636: Performed by: INTERNAL MEDICINE

## 2023-01-24 PROCEDURE — 74011000250 HC RX REV CODE- 250: Performed by: STUDENT IN AN ORGANIZED HEALTH CARE EDUCATION/TRAINING PROGRAM

## 2023-01-24 PROCEDURE — 82962 GLUCOSE BLOOD TEST: CPT

## 2023-01-24 PROCEDURE — 94640 AIRWAY INHALATION TREATMENT: CPT

## 2023-01-24 PROCEDURE — 36415 COLL VENOUS BLD VENIPUNCTURE: CPT

## 2023-01-24 PROCEDURE — P9016 RBC LEUKOCYTES REDUCED: HCPCS

## 2023-01-24 PROCEDURE — 74011250637 HC RX REV CODE- 250/637: Performed by: STUDENT IN AN ORGANIZED HEALTH CARE EDUCATION/TRAINING PROGRAM

## 2023-01-24 PROCEDURE — 85018 HEMOGLOBIN: CPT

## 2023-01-24 PROCEDURE — 80048 BASIC METABOLIC PNL TOTAL CA: CPT

## 2023-01-24 PROCEDURE — 85025 COMPLETE CBC W/AUTO DIFF WBC: CPT

## 2023-01-24 PROCEDURE — 83010 ASSAY OF HAPTOGLOBIN QUANT: CPT

## 2023-01-24 PROCEDURE — 36430 TRANSFUSION BLD/BLD COMPNT: CPT

## 2023-01-24 PROCEDURE — 65270000046 HC RM TELEMETRY

## 2023-01-24 PROCEDURE — 94660 CPAP INITIATION&MGMT: CPT

## 2023-01-24 PROCEDURE — 94761 N-INVAS EAR/PLS OXIMETRY MLT: CPT

## 2023-01-24 PROCEDURE — 77010033678 HC OXYGEN DAILY

## 2023-01-24 PROCEDURE — 74011250637 HC RX REV CODE- 250/637: Performed by: INTERNAL MEDICINE

## 2023-01-24 RX ORDER — FOLIC ACID 1 MG/1
1 TABLET ORAL DAILY
Status: DISCONTINUED | OUTPATIENT
Start: 2023-01-24 | End: 2023-01-31

## 2023-01-24 RX ORDER — SODIUM CHLORIDE 9 MG/ML
250 INJECTION, SOLUTION INTRAVENOUS AS NEEDED
Status: DISPENSED | OUTPATIENT
Start: 2023-01-24 | End: 2023-01-25

## 2023-01-24 RX ORDER — ACETAZOLAMIDE 250 MG/1
250 TABLET ORAL 2 TIMES DAILY
Status: COMPLETED | OUTPATIENT
Start: 2023-01-24 | End: 2023-01-25

## 2023-01-24 RX ADMIN — SODIUM CHLORIDE, PRESERVATIVE FREE 10 ML: 5 INJECTION INTRAVENOUS at 16:50

## 2023-01-24 RX ADMIN — PANTOPRAZOLE SODIUM 40 MG: 40 TABLET, DELAYED RELEASE ORAL at 16:50

## 2023-01-24 RX ADMIN — IPRATROPIUM BROMIDE AND ALBUTEROL SULFATE 3 ML: 2.5; .5 SOLUTION RESPIRATORY (INHALATION) at 08:31

## 2023-01-24 RX ADMIN — SODIUM CHLORIDE, PRESERVATIVE FREE 10 ML: 5 INJECTION INTRAVENOUS at 22:02

## 2023-01-24 RX ADMIN — IRON SUCROSE 100 MG: 20 INJECTION, SOLUTION INTRAVENOUS at 16:50

## 2023-01-24 RX ADMIN — CASTOR OIL AND BALSAM, PERU: 788; 87 OINTMENT TOPICAL at 09:05

## 2023-01-24 RX ADMIN — METOPROLOL TARTRATE 25 MG: 25 TABLET, FILM COATED ORAL at 09:16

## 2023-01-24 RX ADMIN — SODIUM CHLORIDE, PRESERVATIVE FREE 5 ML: 5 INJECTION INTRAVENOUS at 05:06

## 2023-01-24 RX ADMIN — METOPROLOL TARTRATE 25 MG: 25 TABLET, FILM COATED ORAL at 16:55

## 2023-01-24 RX ADMIN — FOLIC ACID 1 MG: 1 TABLET ORAL at 09:07

## 2023-01-24 RX ADMIN — ATORVASTATIN CALCIUM 10 MG: 10 TABLET, FILM COATED ORAL at 22:01

## 2023-01-24 RX ADMIN — ACETAZOLAMIDE 250 MG: 250 TABLET ORAL at 16:54

## 2023-01-24 RX ADMIN — IPRATROPIUM BROMIDE AND ALBUTEROL SULFATE 3 ML: 2.5; .5 SOLUTION RESPIRATORY (INHALATION) at 21:18

## 2023-01-24 RX ADMIN — CASTOR OIL AND BALSAM, PERU: 788; 87 OINTMENT TOPICAL at 22:01

## 2023-01-24 RX ADMIN — PANTOPRAZOLE SODIUM 40 MG: 40 TABLET, DELAYED RELEASE ORAL at 06:49

## 2023-01-24 NOTE — PROGRESS NOTES
Hematology Oncology Progress Note    Follow up for: anemia    Chart notes reviewed since last visit. Case discussed with following: . Patient complains of the following: tired    Additional concerns noted by the staff:     Patient Vitals for the past 24 hrs:   BP Temp Pulse Resp SpO2   01/24/23 0833 -- -- -- -- 93 %   01/24/23 0525 (!) 110/50 98.6 °F (37 °C) 80 18 95 %   01/24/23 0508 (!) 112/50 98.2 °F (36.8 °C) 82 18 95 %   01/24/23 0256 (!) 111/55 98.2 °F (36.8 °C) 83 20 95 %   01/23/23 2300 (!) 133/54 98.1 °F (36.7 °C) 83 18 100 %   01/23/23 2250 -- -- -- -- 93 %   01/23/23 1740 (!) 122/46 -- 81 -- 92 %   01/23/23 1613 -- -- -- -- 90 %   01/23/23 1435 (!) 118/47 97.9 °F (36.6 °C) 81 20 (!) 89 %   01/23/23 1119 (!) 119/57 97.9 °F (36.6 °C) 83 20 93 %       Review of Systems:  12 point ROS done and negative except as above    Physical Examination:  Gen NAD  Resp no distress  Psych normal    Labs:  Recent Results (from the past 24 hour(s))   GLUCOSE, POC    Collection Time: 01/23/23 11:24 AM   Result Value Ref Range    Glucose (POC) 126 (H) 65 - 117 mg/dL    Performed by Marimar Philip PCT    CBC WITH AUTOMATED DIFF    Collection Time: 01/23/23  2:09 PM   Result Value Ref Range    WBC 10.9 3.6 - 11.0 K/uL    RBC 2.69 (L) 3.80 - 5.20 M/uL    HGB 7.6 (L) 11.5 - 16.0 g/dL    HCT 26.8 (L) 35.0 - 47.0 %    MCV 99.6 (H) 80.0 - 99.0 FL    MCH 28.3 26.0 - 34.0 PG    MCHC 28.4 (L) 30.0 - 36.5 g/dL    RDW 16.5 (H) 11.5 - 14.5 %    PLATELET 696 844 - 275 K/uL    MPV 10.9 8.9 - 12.9 FL    NRBC 0.4 (H) 0  WBC    ABSOLUTE NRBC 0.04 (H) 0.00 - 0.01 K/uL    NEUTROPHILS 78 (H) 32 - 75 %    LYMPHOCYTES 15 12 - 49 %    MONOCYTES 5 5 - 13 %    EOSINOPHILS 1 0 - 7 %    BASOPHILS 0 0 - 1 %    IMMATURE GRANULOCYTES 1 (H) 0.0 - 0.5 %    ABS. NEUTROPHILS 8.6 (H) 1.8 - 8.0 K/UL    ABS. LYMPHOCYTES 1.6 0.8 - 3.5 K/UL    ABS. MONOCYTES 0.5 0.0 - 1.0 K/UL    ABS. EOSINOPHILS 0.1 0.0 - 0.4 K/UL    ABS.  BASOPHILS 0.0 0.0 - 0.1 K/UL ABS. IMM.  GRANS. 0.1 (H) 0.00 - 0.04 K/UL    DF SMEAR SCANNED      RBC COMMENTS ANISOCYTOSIS  1+        RBC COMMENTS HYPOCHROMIA  1+        RBC COMMENTS STOMATOCYTES  PRESENT       METABOLIC PANEL, BASIC    Collection Time: 01/23/23  2:09 PM   Result Value Ref Range    Sodium 138 136 - 145 mmol/L    Potassium 4.0 3.5 - 5.1 mmol/L    Chloride 92 (L) 97 - 108 mmol/L    CO2 >45 (HH) 21 - 32 mmol/L    Anion gap Cannot be calculated 5 - 15 mmol/L    Glucose 111 (H) 65 - 100 mg/dL    BUN 19 6 - 20 MG/DL    Creatinine 0.84 0.55 - 1.02 MG/DL    BUN/Creatinine ratio 23 (H) 12 - 20      eGFR >60 >60 ml/min/1.73m2    Calcium 8.5 8.5 - 10.1 MG/DL   VITAMIN B12    Collection Time: 01/23/23  2:09 PM   Result Value Ref Range    Vitamin B12 501 193 - 986 pg/mL   FOLATE    Collection Time: 01/23/23  2:09 PM   Result Value Ref Range    Folate 3.8 (L) 5.0 - 21.0 ng/mL   IRON PROFILE    Collection Time: 01/23/23  2:09 PM   Result Value Ref Range    Iron 31 (L) 35 - 150 ug/dL    TIBC 328 250 - 450 ug/dL    Iron % saturation 9 (L) 20 - 50 %   FERRITIN    Collection Time: 01/23/23  2:09 PM   Result Value Ref Range    Ferritin 32 8 - 252 NG/ML   LD    Collection Time: 01/23/23  2:09 PM   Result Value Ref Range     (H) 81 - 246 U/L   RETICULOCYTE COUNT    Collection Time: 01/23/23  2:09 PM   Result Value Ref Range    Reticulocyte count 5.8 (H) 0.7 - 2.1 %    Absolute Retic Cnt. 0.1553 (H) 0.0164 - 0.0776 M/ul   GLUCOSE, POC    Collection Time: 01/23/23  4:37 PM   Result Value Ref Range    Glucose (POC) 105 65 - 117 mg/dL    Performed by Lorelei Cho    GLUCOSE, POC    Collection Time: 01/23/23  8:54 PM   Result Value Ref Range    Glucose (POC) 131 (H) 65 - 117 mg/dL    Performed by Elver Chavez PCT    CBC WITH AUTOMATED DIFF    Collection Time: 01/24/23 12:27 AM   Result Value Ref Range    WBC 11.6 (H) 3.6 - 11.0 K/uL    RBC 2.43 (L) 3.80 - 5.20 M/uL    HGB 6.8 (L) 11.5 - 16.0 g/dL    HCT 24.8 (L) 35.0 - 47.0 %    .1 (H) 80.0 - 99.0 FL    MCH 28.0 26.0 - 34.0 PG    MCHC 27.4 (L) 30.0 - 36.5 g/dL    RDW 16.2 (H) 11.5 - 14.5 %    PLATELET 351 991 - 506 K/uL    MPV 11.1 8.9 - 12.9 FL    NRBC 0.3 (H) 0  WBC    ABSOLUTE NRBC 0.04 (H) 0.00 - 0.01 K/uL    NEUTROPHILS 76 (H) 32 - 75 %    LYMPHOCYTES 16 12 - 49 %    MONOCYTES 6 5 - 13 %    EOSINOPHILS 1 0 - 7 %    BASOPHILS 0 0 - 1 %    IMMATURE GRANULOCYTES 1 (H) 0.0 - 0.5 %    ABS. NEUTROPHILS 8.8 (H) 1.8 - 8.0 K/UL    ABS. LYMPHOCYTES 1.9 0.8 - 3.5 K/UL    ABS. MONOCYTES 0.7 0.0 - 1.0 K/UL    ABS. EOSINOPHILS 0.1 0.0 - 0.4 K/UL    ABS. BASOPHILS 0.0 0.0 - 0.1 K/UL    ABS. IMM. GRANS. 0.2 (H) 0.00 - 0.04 K/UL    DF AUTOMATED     METABOLIC PANEL, BASIC    Collection Time: 01/24/23 12:27 AM   Result Value Ref Range    Sodium 139 136 - 145 mmol/L    Potassium 3.8 3.5 - 5.1 mmol/L    Chloride 91 (L) 97 - 108 mmol/L    CO2 >45 (HH) 21 - 32 mmol/L    Anion gap Cannot be calculated 5 - 15 mmol/L    Glucose 105 (H) 65 - 100 mg/dL    BUN 20 6 - 20 MG/DL    Creatinine 0.74 0.55 - 1.02 MG/DL    BUN/Creatinine ratio 27 (H) 12 - 20      eGFR >60 >60 ml/min/1.73m2    Calcium 8.3 (L) 8.5 - 10.1 MG/DL   RBC, ALLOCATE    Collection Time: 01/24/23  3:30 AM   Result Value Ref Range    HISTORY CHECKED?  Historical check performed    GLUCOSE, POC    Collection Time: 01/24/23  6:01 AM   Result Value Ref Range    Glucose (POC) 94 65 - 117 mg/dL    Performed by Darlin Bejarano PCT        Assessment and Plan:   Anemia  -folate and iron deficiency, started on folic acid and IV iron  -got 1 more unit of PRBCs for HBG < 7  -check haptoglobin for any sign of hemolysis since LDH and retic up slightly  -GI following, needing to optimise resp status before any attempt at EGD/colonoscopy

## 2023-01-24 NOTE — PROGRESS NOTES
Hospitalist Progress Note    Subjective:   Daily Progress Note: 1/24/2023 11:10 AM    Hospital Course: Diego Brown is a 60 y.o.  female with pertinent past medical history of chronic hypoxemic respiratory failure on 4 L/min home oxygen therapy secondary to COPD, diabetes mellitus, essential hypertension, hyperlipidemia, JOSLYN compliant with CPAP, history duodenal ulcers, chronic anemia of indeterminate etiology who presents after routine lab work performed yesterday demonstrated hemoglobin of 5.3. Patient denies any sources of blood loss including hematochezia, melena, hematuria, hematemesis, epistaxis, bruising/bleeding. In the ED, patient afebrile and hemodynamically stable saturating upper 90s on baseline 4 L/min home oxygen therapy. Labs demonstrate: WBC 9.3, hemoglobin 5.3, platelets 946, sodium 140, potassium 4.4, chloride 95, bicarb 42 (chronic), glucose 152, BUN 17, creatinine 0.89, lactic acid 3.86 improved to 2.4 with IV fluids and 1 unit packed red blood cells. Fecal occult blood negative. UA not reflexed to culture. CXR demonstrates diffuse hazy interstitial opacities suggestive of edema with bandlike right upper lobe atelectasis. Patient was transfused with PRBC. GI consulted. GI recommends hematology evaluation. Deferred EGD at this time as patient doesn't have signs of overt bleeding and fecal occult blood test was negative. Subjective:  Patient reports improving shortness of breath. Assessment / Plan:  Acute on chronic anemia:  Low suspicion for GI bleed  Will monitor hb  Appreciate GI consult  Appreciate hematology consult  Folic acid, iron, ferritin low  Start folic acid and iv iron. Hb low this morning, was transfused with additional 1  unit of PRBC.   EGD/colonoscopy deferred at this time as patient is extremely high risk for anesthesia. GI recommends repeat ECHO to assess for pulmonary hypertension prior to EGD/colon.      Acute on chronic hypoxic respiratory failure  COPD  JOSLYN compliant with CPAP  Diastolic heart failure  Patient reported increasing shortness of breath and is dasaturating on 4L of supplemental oxygen which is her baseline. Will give acetazolamide as CO2 is high  Continue supplemental oxygen and titrate as needed  Continue CPAP at night time  Outpatient pulmonary follow up. Type II DM:  Continue lispro sliding scale    Paroxysmal afib  CAD:  Continue statin, metoprolol  Aspirin held. Obesity:  Complicates all aspect of care    40 or above Morbid obesity / Body mass index is 49.99 kg/m². Code status: Full  Prophylaxis: SCD's  Recommended Disposition: Home w/Family  Estimated discharge: 1/25/23- 1/26/23  Barrier: low Hb, Hb stabilization, ECHO, GI and hematology clearance.         Current Facility-Administered Medications   Medication Dose Route Frequency    0.9% sodium chloride infusion 250 mL  250 mL IntraVENous PRN    folic acid (FOLVITE) tablet 1 mg  1 mg Oral DAILY    iron sucrose (VENOFER) injection 100 mg  100 mg IntraVENous DAILY    acetaZOLAMIDE (DIAMOX) tablet 250 mg  250 mg Oral BID    albuterol-ipratropium (DUO-NEB) 2.5 MG-0.5 MG/3 ML  3 mL Nebulization Q4H PRN    atorvastatin (LIPITOR) tablet 10 mg  10 mg Oral QHS    metoprolol tartrate (LOPRESSOR) tablet 25 mg  25 mg Oral BID    pantoprazole (PROTONIX) tablet 40 mg  40 mg Oral ACB&D    sodium chloride (NS) flush 5-40 mL  5-40 mL IntraVENous Q8H    sodium chloride (NS) flush 5-40 mL  5-40 mL IntraVENous PRN    acetaminophen (TYLENOL) tablet 650 mg  650 mg Oral Q6H PRN    Or    acetaminophen (TYLENOL) suppository 650 mg  650 mg Rectal Q6H PRN    polyethylene glycol (MIRALAX) packet 17 g  17 g Oral DAILY PRN    ondansetron (ZOFRAN ODT) tablet 4 mg  4 mg Oral Q8H PRN    Or    ondansetron (ZOFRAN) injection 4 mg  4 mg IntraVENous Q6H PRN    insulin lispro (HUMALOG) injection   SubCUTAneous AC&HS    glucose chewable tablet 16 g  4 Tablet Oral PRN    glucagon (GLUCAGEN) injection 1 mg 1 mg IntraMUSCular PRN    dextrose 10% infusion 0-250 mL  0-250 mL IntraVENous PRN    balsam peru-castor oiL (VENELEX) ointment   Topical BID        Review of Systems  Constitutional: No fevers, No chills, No sweats, No fatigue, No Weakness  Eyes: No redness  Ears, nose, mouth, throat, and face: No nasal congestion, No sore throat, No voice change  Respiratory: improving  Shortness of Breath, No cough, No wheezing  Cardiovascular: No chest pain, No palpitations, No extremity edema  Gastrointestinal: No nausea, No vomiting, No diarrhea, No abdominal pain  Genitourinary: No frequency, No dysuria, No hematuria  Integument/breast: No skin lesion(s)   Neurological: No Confusion, No headaches, No dizziness      Objective:     Visit Vitals  BP (!) 120/52   Pulse 69   Temp 98.2 °F (36.8 °C)   Resp 24   Ht 5' 3\" (1.6 m)   Wt 128 kg (282 lb 3 oz)   SpO2 90%   Breastfeeding No   BMI 49.99 kg/m²    O2 Flow Rate (L/min): 4 l/min O2 Device: Nasal cannula    Temp (24hrs), Av.3 °F (36.8 °C), Min:98.1 °F (36.7 °C), Max:99 °F (37.2 °C)       07 -  1900  In: 406.7   Out: -    190 -  0700  In: 2 [P.O.:2]  Out: 2350 [Urine:2350]    PHYSICAL EXAM:  Constitutional: No acute distress, morbidly obese. Skin: Extremities and face reveal no rashes. HEENT: Sclerae anicteric. Extra-occular muscles are intact. No oral ulcers. The neck is supple and no masses. Cardiovascular: Regular rate and rhythm. Respiratory: B/L decrease air entry  GI: Abdomen nondistended, soft, and nontender. Normal active bowel sounds. Musculoskeletal: No pitting edema of the lower legs. Able to move all ext. Dressing present on right big toe. B/L pitting edema  Neurological:  Patient is alert and oriented.  Cranial nerves II-XII grossly intact  Psychiatric: Mood appears appropriate       Data Review    Recent Results (from the past 24 hour(s))   GLUCOSE, POC    Collection Time: 23  4:37 PM   Result Value Ref Range    Glucose (POC) 105 65 - 117 mg/dL    Performed by Serenity Cheng    GLUCOSE, POC    Collection Time: 01/23/23  8:54 PM   Result Value Ref Range    Glucose (POC) 131 (H) 65 - 117 mg/dL    Performed by Anna Villeda PCT    CBC WITH AUTOMATED DIFF    Collection Time: 01/24/23 12:27 AM   Result Value Ref Range    WBC 11.6 (H) 3.6 - 11.0 K/uL    RBC 2.43 (L) 3.80 - 5.20 M/uL    HGB 6.8 (L) 11.5 - 16.0 g/dL    HCT 24.8 (L) 35.0 - 47.0 %    .1 (H) 80.0 - 99.0 FL    MCH 28.0 26.0 - 34.0 PG    MCHC 27.4 (L) 30.0 - 36.5 g/dL    RDW 16.2 (H) 11.5 - 14.5 %    PLATELET 186 857 - 784 K/uL    MPV 11.1 8.9 - 12.9 FL    NRBC 0.3 (H) 0  WBC    ABSOLUTE NRBC 0.04 (H) 0.00 - 0.01 K/uL    NEUTROPHILS 76 (H) 32 - 75 %    LYMPHOCYTES 16 12 - 49 %    MONOCYTES 6 5 - 13 %    EOSINOPHILS 1 0 - 7 %    BASOPHILS 0 0 - 1 %    IMMATURE GRANULOCYTES 1 (H) 0.0 - 0.5 %    ABS. NEUTROPHILS 8.8 (H) 1.8 - 8.0 K/UL    ABS. LYMPHOCYTES 1.9 0.8 - 3.5 K/UL    ABS. MONOCYTES 0.7 0.0 - 1.0 K/UL    ABS. EOSINOPHILS 0.1 0.0 - 0.4 K/UL    ABS. BASOPHILS 0.0 0.0 - 0.1 K/UL    ABS. IMM. GRANS. 0.2 (H) 0.00 - 0.04 K/UL    DF AUTOMATED     METABOLIC PANEL, BASIC    Collection Time: 01/24/23 12:27 AM   Result Value Ref Range    Sodium 139 136 - 145 mmol/L    Potassium 3.8 3.5 - 5.1 mmol/L    Chloride 91 (L) 97 - 108 mmol/L    CO2 >45 (HH) 21 - 32 mmol/L    Anion gap Cannot be calculated 5 - 15 mmol/L    Glucose 105 (H) 65 - 100 mg/dL    BUN 20 6 - 20 MG/DL    Creatinine 0.74 0.55 - 1.02 MG/DL    BUN/Creatinine ratio 27 (H) 12 - 20      eGFR >60 >60 ml/min/1.73m2    Calcium 8.3 (L) 8.5 - 10.1 MG/DL   RBC, ALLOCATE    Collection Time: 01/24/23  3:30 AM   Result Value Ref Range    HISTORY CHECKED?  Historical check performed    GLUCOSE, POC    Collection Time: 01/24/23  6:01 AM   Result Value Ref Range    Glucose (POC) 94 65 - 117 mg/dL    Performed by Anna Villeda PCT    GLUCOSE, POC    Collection Time: 01/24/23 11:51 AM   Result Value Ref Range    Glucose (POC) 96 65 - 117 mg/dL    Performed by Meka Parish    HAPTOGLOBIN    Collection Time: 01/24/23 12:30 PM   Result Value Ref Range    Haptoglobin 197 30 - 200 mg/dL   SAMPLES BEING HELD    Collection Time: 01/24/23 12:30 PM   Result Value Ref Range    SAMPLES BEING HELD RED     COMMENT        Add-on orders for these samples will be processed based on acceptable specimen integrity and analyte stability, which may vary by analyte. Time spent 35 minutes involving direct patient care as well as reviewing patient's labs and coordination of care with nursing staff     Care Plan discussed with: Patient/Family/RN/Case Management        Total time spent with patient: 35 minutes.

## 2023-01-24 NOTE — PROGRESS NOTES
Gastroenterology Daily Progress Note   COREEN Marrero for Dr. Rodriguez Case)   [facility] [facility] [address] Date: 1/21/2023     Follow up of anemia    Subjective:       She has not had a BM or any vomiting but hgb dropped from 7.6 to 6.8 and is getting 1 unit of PRBC's  She feels fine but is mildly confused this AM.  \"Is it morning? \" When I asked her if she slept last night she said \"not sure. \"  CO2 >45  Folate low at 3.8  Iron low at 31 and iron sat low at 9%  No melena, hematochezia or hematemesis/coffee ground emesis  Stool heme negative  Was on Xarelto PTA  No NSAID use, was not on PPI PTA  Presented with macrocytic anemia, mcv 100 on admission  Has never had a colonoscopy, No FH of CRC  has sleep apnea, BMI 49.99, CXR shows pulmonary edema  Is bedbound  Recalls about 1-2 years ago was having a procedure with anesthesia and it had to be stopped because her \"heart stopped\"  On 4L nasal cannula, gets SOB with talking  Wears CPAP at night, pulm following    Current Facility-Administered Medications   Medication Dose Route Frequency    0.9% sodium chloride infusion 250 mL  250 mL IntraVENous PRN    folic acid (FOLVITE) tablet 1 mg  1 mg Oral DAILY    iron sucrose (VENOFER) injection 100 mg  100 mg IntraVENous DAILY    albuterol-ipratropium (DUO-NEB) 2.5 MG-0.5 MG/3 ML  3 mL Nebulization Q4H PRN    atorvastatin (LIPITOR) tablet 10 mg  10 mg Oral QHS    metoprolol tartrate (LOPRESSOR) tablet 25 mg  25 mg Oral BID    pantoprazole (PROTONIX) tablet 40 mg  40 mg Oral ACB&D    sodium chloride (NS) flush 5-40 mL  5-40 mL IntraVENous Q8H    sodium chloride (NS) flush 5-40 mL  5-40 mL IntraVENous PRN    acetaminophen (TYLENOL) tablet 650 mg  650 mg Oral Q6H PRN    Or    acetaminophen (TYLENOL) suppository 650 mg  650 mg Rectal Q6H PRN    polyethylene glycol (MIRALAX) packet 17 g  17 g Oral DAILY PRN    ondansetron (ZOFRAN ODT) tablet 4 mg  4 mg Oral Q8H PRN    Or    ondansetron (ZOFRAN) injection 4 mg  4 mg IntraVENous Q6H PRN    insulin lispro (HUMALOG) injection   SubCUTAneous AC&HS    glucose chewable tablet 16 g  4 Tablet Oral PRN    glucagon (GLUCAGEN) injection 1 mg  1 mg IntraMUSCular PRN    dextrose 10% infusion 0-250 mL  0-250 mL IntraVENous PRN    albuterol-ipratropium (DUO-NEB) 2.5 MG-0.5 MG/3 ML  3 mL Nebulization QID RT    balsam peru-castor oiL (VENELEX) ointment   Topical BID    0.9% sodium chloride infusion 250 mL  250 mL IntraVENous PRN        Objective:     Visit Vitals  BP (!) 110/50   Pulse 80   Temp 98.6 °F (37 °C)   Resp 18   Ht 5' 3\" (1.6 m)   Wt 128 kg (282 lb 3 oz)   SpO2 95%   Breastfeeding No   BMI 49.99 kg/m²   Blood pressure (!) 110/50, pulse 80, temperature 98.6 °F (37 °C), resp. rate 18, height 5' 3\" (1.6 m), weight 128 kg (282 lb 3 oz), SpO2 95 %, not currently breastfeeding. No intake/output data recorded. 01/22 1901 - 01/24 0700  In: 2 [P.O.:2]  Out: 2350 [Urine:2350]      Intake/Output Summary (Last 24 hours) at 1/24/2023 6116  Last data filed at 1/24/2023 8864  Gross per 24 hour   Intake --   Output 2350 ml   Net -2350 ml           Physical Exam:       General: morbidly obese white female with thick neck laying in bed in nad  Chest:  CTA, No rhonchi, rales or rubs. No use of accessory muscles.     Heart: S1, S2, RRR  GI: Morbidly obese, Soft, NT, ND + bowel sounds  Extremities: 2+ pitting edema bilaterally  CNS: mildly confused this AM      Labs:       Recent Results (from the past 24 hour(s))   GLUCOSE, POC    Collection Time: 01/23/23 11:24 AM   Result Value Ref Range    Glucose (POC) 126 (H) 65 - 117 mg/dL    Performed by [facility] PCT    CBC WITH AUTOMATED DIFF    Collection Time: 01/23/23  2:09 PM   Result Value Ref Range    WBC 10.9 3.6 - 11.0 K/uL    RBC 2.69 (L) 3.80 - 5.20 M/uL    HGB 7.6 (L) 11.5 - 16.0 g/dL    HCT 26.8 (L) 35.0 - 47.0 %    MCV 99.6 (H) 80.0 - 99.0 FL    MCH 28.3 26.0 - 34.0 PG    MCHC 28.4 (L) 30.0 - 36.5 g/dL    RDW 16.5 (H) 11.5 - 14.5 % PLATELET 805 099 - 280 K/uL    MPV 10.9 8.9 - 12.9 FL    NRBC 0.4 (H) 0  WBC    ABSOLUTE NRBC 0.04 (H) 0.00 - 0.01 K/uL    NEUTROPHILS 78 (H) 32 - 75 %    LYMPHOCYTES 15 12 - 49 %    MONOCYTES 5 5 - 13 %    EOSINOPHILS 1 0 - 7 %    BASOPHILS 0 0 - 1 %    IMMATURE GRANULOCYTES 1 (H) 0.0 - 0.5 %    ABS. NEUTROPHILS 8.6 (H) 1.8 - 8.0 K/UL    ABS. LYMPHOCYTES 1.6 0.8 - 3.5 K/UL    ABS. MONOCYTES 0.5 0.0 - 1.0 K/UL    ABS. EOSINOPHILS 0.1 0.0 - 0.4 K/UL    ABS. BASOPHILS 0.0 0.0 - 0.1 K/UL    ABS. IMM.  GRANS. 0.1 (H) 0.00 - 0.04 K/UL    DF SMEAR SCANNED      RBC COMMENTS ANISOCYTOSIS  1+        RBC COMMENTS HYPOCHROMIA  1+        RBC COMMENTS STOMATOCYTES  PRESENT       METABOLIC PANEL, BASIC    Collection Time: 01/23/23  2:09 PM   Result Value Ref Range    Sodium 138 136 - 145 mmol/L    Potassium 4.0 3.5 - 5.1 mmol/L    Chloride 92 (L) 97 - 108 mmol/L    CO2 >45 (HH) 21 - 32 mmol/L    Anion gap Cannot be calculated 5 - 15 mmol/L    Glucose 111 (H) 65 - 100 mg/dL    BUN 19 6 - 20 MG/DL    Creatinine 0.84 0.55 - 1.02 MG/DL    BUN/Creatinine ratio 23 (H) 12 - 20      eGFR >60 >60 ml/min/1.73m2    Calcium 8.5 8.5 - 10.1 MG/DL   VITAMIN B12    Collection Time: 01/23/23  2:09 PM   Result Value Ref Range    Vitamin B12 501 193 - 986 pg/mL   FOLATE    Collection Time: 01/23/23  2:09 PM   Result Value Ref Range    Folate 3.8 (L) 5.0 - 21.0 ng/mL   IRON PROFILE    Collection Time: 01/23/23  2:09 PM   Result Value Ref Range    Iron 31 (L) 35 - 150 ug/dL    TIBC 328 250 - 450 ug/dL    Iron % saturation 9 (L) 20 - 50 %   FERRITIN    Collection Time: 01/23/23  2:09 PM   Result Value Ref Range    Ferritin 32 8 - 252 NG/ML   LD    Collection Time: 01/23/23  2:09 PM   Result Value Ref Range     (H) 81 - 246 U/L   RETICULOCYTE COUNT    Collection Time: 01/23/23  2:09 PM   Result Value Ref Range    Reticulocyte count 5.8 (H) 0.7 - 2.1 %    Absolute Retic Cnt. 0.1553 (H) 0.0164 - 0.0776 M/ul   GLUCOSE, POC    Collection Time: 01/23/23  4:37 PM   Result Value Ref Range    Glucose (POC) 105 65 - 117 mg/dL    Performed by Serenity Cheng    GLUCOSE, POC    Collection Time: 01/23/23  8:54 PM   Result Value Ref Range    Glucose (POC) 131 (H) 65 - 117 mg/dL    Performed by Anna Villeda PCT    CBC WITH AUTOMATED DIFF    Collection Time: 01/24/23 12:27 AM   Result Value Ref Range    WBC 11.6 (H) 3.6 - 11.0 K/uL    RBC 2.43 (L) 3.80 - 5.20 M/uL    HGB 6.8 (L) 11.5 - 16.0 g/dL    HCT 24.8 (L) 35.0 - 47.0 %    .1 (H) 80.0 - 99.0 FL    MCH 28.0 26.0 - 34.0 PG    MCHC 27.4 (L) 30.0 - 36.5 g/dL    RDW 16.2 (H) 11.5 - 14.5 %    PLATELET 270 586 - 865 K/uL    MPV 11.1 8.9 - 12.9 FL    NRBC 0.3 (H) 0  WBC    ABSOLUTE NRBC 0.04 (H) 0.00 - 0.01 K/uL    NEUTROPHILS 76 (H) 32 - 75 %    LYMPHOCYTES 16 12 - 49 %    MONOCYTES 6 5 - 13 %    EOSINOPHILS 1 0 - 7 %    BASOPHILS 0 0 - 1 %    IMMATURE GRANULOCYTES 1 (H) 0.0 - 0.5 %    ABS. NEUTROPHILS 8.8 (H) 1.8 - 8.0 K/UL    ABS. LYMPHOCYTES 1.9 0.8 - 3.5 K/UL    ABS. MONOCYTES 0.7 0.0 - 1.0 K/UL    ABS. EOSINOPHILS 0.1 0.0 - 0.4 K/UL    ABS. BASOPHILS 0.0 0.0 - 0.1 K/UL    ABS. IMM. GRANS. 0.2 (H) 0.00 - 0.04 K/UL    DF AUTOMATED     METABOLIC PANEL, BASIC    Collection Time: 01/24/23 12:27 AM   Result Value Ref Range    Sodium 139 136 - 145 mmol/L    Potassium 3.8 3.5 - 5.1 mmol/L    Chloride 91 (L) 97 - 108 mmol/L    CO2 >45 (HH) 21 - 32 mmol/L    Anion gap Cannot be calculated 5 - 15 mmol/L    Glucose 105 (H) 65 - 100 mg/dL    BUN 20 6 - 20 MG/DL    Creatinine 0.74 0.55 - 1.02 MG/DL    BUN/Creatinine ratio 27 (H) 12 - 20      eGFR >60 >60 ml/min/1.73m2    Calcium 8.3 (L) 8.5 - 10.1 MG/DL   RBC, ALLOCATE    Collection Time: 01/24/23  3:30 AM   Result Value Ref Range    HISTORY CHECKED?  Historical check performed    GLUCOSE, POC    Collection Time: 01/24/23  6:01 AM   Result Value Ref Range    Glucose (POC) 94 65 - 117 mg/dL    Performed by Anna Villeda PCT    LABRCNT(wbc:2,hgb:2,hct:2,plt:2,)  Recent Labs     01/24/23  0027 01/23/23  1409 01/22/23  0033    138 139   K 3.8 4.0 4.6   CL 91* 92* 96*   CO2 >45* >45* 42*   BUN 20 19 17   CREA 0.74 0.84 0.87   * 111* 129*   CA 8.3* 8.5 7.9*     LABRCNT(sgot:3,gpt:3,ap:3,tbiL:3,TP:3,ALB:3,GLOB:3,ggt:3,aml:3,amyp:3,lpse:3,hlpse:3)No results for input(s): INR, PTP, APTT, INREXT, INREXT in the last 72 hours. Recent Labs     01/21/23 1953   AP 97   TP 7.2   ALB 2.0*   GLOB 5.2*     BRIEFLAB(B12,FOL,FOLAT,RBCF)  Lab Results   Component Value Date/Time    Folate 3.8 (L) 01/23/2023 02:09 PM     LABRCNT(CPK:3,CpKMB:3,ckndx:3,troiq:3)No components found for: GLPOCBRIEFLAB(CHOL,CHOLX,CHOLP,CHLST,CHOLV,HDL,HDLC,HDLP,LDL,DLDL,LDLC,DLDLP,TGL,TGLX,TRIGL,TRIGP,CHHD,CHHDX)No results for input(s): PH, PCO2, PO2 in the last 72 hours. LABRCNT(CPK:3,CpKMB:3,ckndx:3,troiq:3)COREEN Cardenas  No results for input(s): CPK, CKNDX, TROIQ in the last 72 hours. 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 However, she is extremely high risk for anesthesia complications due to her morbid obesity,  severe CO2 retention, JOSLYN, COPD, pulmonary edema, chronic respiratory failure/hypoxemia. She may have undiagnosed pulmonary HTN as well as her last echo was a poor study due to body habitus. I would recommend a repeat echocardiogram. She has evidence of both iron def and folate deficiency. She is getting folic acid, IV iron and PRBC transfusion.   No overt GIB and her stool was heme negative (which is very sensitive for blood) despite being on anticoagulation. She is still very high risk for anesthesia complications and would recommend optimizing her respiratory status, hypercapnia and repeating echocardiogram to assess for pulmonary HTN prior to EGD/Colon. Transfuse as needed to keep hgb >7.            COREEN Hyde    1/24/2023 [address] [facility], [address] [place] [address]  [address] [address]  [address] [place] [address] [place]: [phone]

## 2023-01-24 NOTE — PROGRESS NOTES
End of Shift Note     Bedside shift change report given to MARIE Mallory (oncoming nurse) bY Kumar Thrasher RN (offgoing nurse). Report included the following information SBAR, Kardex, and Recent Results     Shift worked: 0700- 1900   Shift summary and any significant changes:     Labs drawn. CO2 > 45, MD aware. Patient refused to get the heels elevated. Call bell and phone within reach of patient, able to make needs known.          Concerns for physician to address: none   Zone phone for oncoming shift:  [phone]      Patient Information  Bjorn Almonte  60 y.o.  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    Sciatica 04/21/2022    Hypoxia 04/21/2022    COPD exacerbation ([address] 04/21/2022    CHF exacerbation ([address] 04/21/2022    SOB (shortness of breath) 12/04/2020    Physical deconditioning 12/04/2020    Counseling regarding advance care planning and goals of care 12/04/2020    Atrial flutter with rapid ventricular response ([address] 11/20/2020    JOSLYN (obstructive sleep apnea) 11/20/2020    Acute on chronic respiratory failure with hypoxia and hypercapnia ([address] 11/20/2020    Acute respiratory failure ([address] 11/18/2020    Mixed hyperlipidemia 11/15/2020    Morbid obesity ([address] 11/15/2020    A-fib ([address] 11/04/2020    S/P cardiac cath 11/04/2020    COVID-19 ruled out 07/24/2020           Past Medical History:   Diagnosis Date    Acute on chronic respiratory failure with hypoxia and hypercapnia ([address] 11/20/2020    Atrial fibrillation ([address] 11/4/2020    [facility]est[facility]) [facility] Diabetes ([facility]      Hypertension      Ill-defined condition       high cholesterol    Ill-defined condition       tachycardia    JOSLYN (obstructive sleep apnea) 11/20/2020    Other and unspecified symptoms and signs involving general sensations and perceptions       ruptured disc    S/P cardiac cath 11/4/2020     11/3/2020 nonobstructive disease         Core Measures:  CVA: No No  CHF:Yes Yes  PNA:No No     Activity:  Activity Level: Bed Rest  Number times ambulated in hallways past shift: 0  Number of times OOB to chair past shift: 0     Cardiac:   Cardiac Monitoring: Yes      Cardiac Rhythm: Sinus Rhythm     Access:   Current line(s): PIV   Central Line? No     Genitourinary:   Urinary status: voiding and incontinent  Urinary Catheter? No      Respiratory:   O2 Device: CPAP mask  Chronic home O2 use?: YES  Incentive spirometer at bedside: NO     GI:  Last Bowel Movement Date: 01/20/23  Current diet:  ADULT DIET Dysphagia - Minced & Moist; 5 carb choices (75 gm/meal); meat  DIET ONE TIME MESSAGE  Passing flatus: YES  Tolerating current diet: YES     Pain Management:   Patient states pain is manageable on current regimen: YES     Skin:  Gaston Score: 12  Interventions: float heels, increase time out of bed, PT/OT consult, and internal/external urinary devices    Patient Safety:  Fall Score: Total Score: 2  Interventions: bed/chair alarm, assistive device (walker, cane, etc), gripper socks, and pt to call before getting OOB        DVT prophylaxis:  DVT prophylaxis Med- Yes  DVT prophylaxis SCD or SHANNON- No      Wounds: (If Applicable)  Wounds- Yes  Location sacrum and R large toe     Active Consults:  IP CONSULT TO GASTROENTEROLOGY  IP CONSULT TO PODIATRY     Length of Stay:  Expected LOS: - - -  Actual LOS: 2  Discharge Plan: Yes When medically stable.       Leida Maria RN

## 2023-01-24 NOTE — CONSULTS
Pulmonary, Critical Care, and Sleep Medicine      Name: Bjorn Almonte MRN: [id number]   : 1960 Hospital: [facility]   Date: 2023  Admission date: 2023 Hospital Day: 4       History:     23: Medical records and data reviewed. Pt no longer able to go to physician offices. Followed by home visiting physicians? Had Trilogy machine at home. Likes CPAP machine here better. Found to be eating when fully supine. Pt strongly advised to sit up for meals. High risk of aspiration. IMPRESSION:   Acute chronic respiratory failure with Hypoxia and Hypercapnea; home O2, NIV  Volume overload and fluid retention in the context of morbid obesity. Asthma (vs COPD) not acutely exacerbated; normal eosinophils, nl proBNP  Former smoker  Suspected JOSLYN and /or OHS  (COPD OHS Thoracic Restrictive disease ) Restrictive disease from obesity J84.4 and E 66.8  Afib, refractory  DM   Non-ambulatory at baseline due to arthritis, Obesity, SOB ; bedbound; wear adult diapers  Prognosis poor   Morbid Obesity Body mass index is 49.99 kg/m². RECOMMENDATIONS/PLAN:   Continue noninvasive ventilation or CPAP while here   If pt here for prolonged period, would ask her daughter to bring in NIV  Follow up with home visiting physician group. Will not be able to recertify O2 or NIV since she no longer comes to our office. Agree with diuresis  Patient education   Aspiration precautions  bronchial hygiene with respiratory therapy techniques, bronchodilators  DVT prophylaxis  Prescription drug management with home med reconciliation reviewed          [x] High complexity decision making was performed  [x] See my orders for details      Subjective/Initial History:     I was asked by Edwar Vick DO to see Bjorn Almonte  a 60 y.o.    female in consultation for a chief complaint of respiratory failure    Excerpts from admission 2023 or consult notes as follows:     \"   Maria L Antonio Warner Barba is a 60 y.o.  female with pertinent past medical history of chronic hypoxemic respiratory failure on 4 L/min home oxygen therapy secondary to COPD, diabetes mellitus, essential hypertension, hyperlipidemia, JOSLYN compliant with CPAP, history duodenal ulcers, chronic anemia of indeterminate etiology who presents after routine lab work performed yesterday demonstrated hemoglobin of 5.3. Patient denies any sources of blood loss including hematochezia, melena, hematuria, hematemesis, epistaxis, bruising/bleeding. She reports she has had lower than normal blood pressures at home with systolics in the 44Z when her baseline is in the 130s, but denies any other associated symptoms. Medication reconciliation does not list any anticoagulation, but patient reports she is taking a blood thinner-unsure which one. ROS otherwise negative. Patient denies tobacco, alcohol, or illicit drug use. In the ED, patient afebrile and hemodynamically stable saturating upper 90s on baseline 4 L/min home oxygen therapy. Labs demonstrate: WBC 9.3, hemoglobin 5.3, platelets 126, sodium 140, potassium 4.4, chloride 95, bicarb 42 (chronic), glucose 152, BUN 17, creatinine 0.89, lactic acid 3.86 improved to 2.4 with IV fluids and 1 unit packed red blood cells. Fecal occult blood negative. UA not reflexed to culture. CXR demonstrates diffuse hazy interstitial opacities suggestive of edema with bandlike right upper lobe atelectasis. \"    Patient poor historian. Patient sent home April 2022 with noninvasive ventilation. Patient thinks she is on CPAP. Now on BiPAP. She has never made it back to the office for clinic follow-up. [employer] company is [employer]. She does not recall having had a sleep study. At 1 point she weighed 310 pounds. She states she lost 50 pounds. Weight on admission 282 pounds. BMI 50 kg/m². Patient says she is bedbound. Daughter helps her at home. Daughter has back problems.   Patient without any stomach pain.  She is thirsty all the time. She has no teeth. Last saw Dr. Alda St in the office December 2020. Echocardiogram April 2022 left ejection fraction 50 to 65%     PAST MEDICAL HISTORY:  Significant for AFib, CHF, diabetes, hypertension, obstructive sleep apnea, COPD    Allergies   Allergen Reactions    Cefepime Rash    Cefazolin Rash    Other Plant, Animal, Environmental Rash     Ivory soap caused rash on hands. MAR reviewed and pertinent medications noted or modified as needed       Current Facility-Administered Medications   Medication    0.9% sodium chloride infusion 417 mL    folic acid (FOLVITE) tablet 1 mg    iron sucrose (VENOFER) injection 100 mg    albuterol-ipratropium (DUO-NEB) 2.5 MG-0.5 MG/3 ML    atorvastatin (LIPITOR) tablet 10 mg    metoprolol tartrate (LOPRESSOR) tablet 25 mg    pantoprazole (PROTONIX) tablet 40 mg    sodium chloride (NS) flush 5-40 mL    sodium chloride (NS) flush 5-40 mL    acetaminophen (TYLENOL) tablet 650 mg    Or    acetaminophen (TYLENOL) suppository 650 mg    polyethylene glycol (MIRALAX) packet 17 g    ondansetron (ZOFRAN ODT) tablet 4 mg    Or    ondansetron (ZOFRAN) injection 4 mg    insulin lispro (HUMALOG) injection    glucose chewable tablet 16 g    glucagon (GLUCAGEN) injection 1 mg    dextrose 10% infusion 0-250 mL    balsam peru-castor oiL (VENELEX) ointment      Patient PCP: Unknown, Provider  PMH:  has a past medical history of Acute on chronic respiratory failure with hypoxia and hypercapnia ([address] (11/20/2020), Atrial fibrillation ([address] (11/4/2020), CHF (congestive heart failure) ([address] Diabetes ([address] Hypertension, Ill-defined condition, Ill-defined condition, JOSLYN (obstructive sleep apnea) (11/20/2020), Other and unspecified symptoms and signs involving general sensations and perceptions, and S/P cardiac cath (11/4/2020).   PSH:   has a past surgical history that includes pr compre ep eval abltj 3d mapg tx svt (N/A, 11/23/2020); pr icar catheter ablation arrhythmia add on (N/A, 2020); pr intracardiac electrophysiologic 3d mapping (N/A, 2020); pr compre electrophysiol xm w/left atrial pacng/rec (N/A, 2020); and pr programmed stimj & pacg after iv drug nfs (N/A, 2020). FHX: family history is not on file. SHX:  reports that she has quit smoking. She has never used smokeless tobacco. She reports that she does not drink alcohol and does not use drugs. ROS:A comprehensive review of systems was negative except for that written in the HPI. Objective:     Vital Signs: Telemetry:     Intake/Output:   Visit Vitals  BP (!) 109/52 (BP 1 Location: Left lower arm, BP Patient Position: Supine)   Pulse 70   Temp 99 °F (37.2 °C)   Resp 18   Ht 5' 3\" (1.6 m)   Wt 128 kg (282 lb 3 oz)   SpO2 90%   Breastfeeding No   BMI 49.99 kg/m²       Temp (24hrs), Av.4 °F (36.9 °C), Min:98.1 °F (36.7 °C), Max:99 °F (37.2 °C)        O2 Device: Nasal cannula O2 Flow Rate (L/min): 4 l/min       Wt Readings from Last 4 Encounters:   23 128 kg (282 lb 3 oz)   06/15/22 136.8 kg (301 lb 9.6 oz)   22 135.5 kg (298 lb 11.6 oz)   22 134.7 kg (297 lb)          Intake/Output Summary (Last 24 hours) at 2023 1440  Last data filed at 2023 2101  Gross per 24 hour   Intake 406.67 ml   Output 1200 ml   Net -793.33 ml         Last shift:       07 -  1900  In: 406.7   Out: -   Last 3 shifts: 1901 -  0700  In: 2 [P.O.:2]  Out: 2350 [Urine:2350]       Physical Exam:   General:  female; cooperative;  standby BIPAP/NIV; NC;  HEENT: NCAT, no dentition, lips and mucosa dry  Eyes: anicteric; conjunctiva clear  Neck: no nodes, no accessory MM use. Chest: no deformity, thick  Cardiac: regular rate, rhythm; no murmur  Lungs: distant breath sounds; no nwheezes, deisy,  no rhonchi  Abd: soft, NT, hypoactive BS, OBESE,  Ext: 2-3+ edema; no joint swelling;  No clubbing  : No irwin,   Neuro: fluent,  follows commands; generalized weakness  Psych- no agitation, oriented to person;   Skin: warm, dry, no cyanosis;   Pulses: 1-2+ Bilateral pedal, radial  Capillary: brisk; pale    Labs:    Recent Labs     01/24/23  0027 01/23/23  1409 01/22/23  1858 01/22/23  0611 01/22/23  0033   WBC 11.6* 10.9  --   --  10.4   HGB 6.8* 7.6* 8.1*   < > 5.8*    244  --   --  274    < > = values in this interval not displayed. Recent Labs     01/24/23  0027 01/23/23  1409 01/22/23  0741 01/22/23  0033 01/21/23 1953    138  --  139 140   K 3.8 4.0  --  4.6 4.4   CL 91* 92*  --  96* 95*   CO2 >45* >45*  --  42* 42*   * 111*  --  129* 152*   BUN 20 19  --  17 17   CREA 0.74 0.84  --  0.87 0.89   CA 8.3* 8.5  --  7.9* 8.3*   LAC  --   --  1.9 2.4*  --    ALB  --   --   --   --  2.0*   ALT  --   --   --   --  27       No results for input(s): PH, PCO2, PO2, HCO3, FIO2 in the last 72 hours. No results for input(s): CPK, CKNDX, TROIQ in the last 72 hours. No lab exists for component: CPKMB  Lab Results   Component Value Date/Time    BNP 14 04/19/2009 05:00 PM        Lab Results   Component Value Date/Time    Culture result: (A) 01/21/2023 07:45 PM     PROBABLE Staphylococcus aureus SENSITIVITY TO FOLLOW GROWING IN 1 OF 1 BOTTLES DRAWN SITE = RA    Culture result: MRSA NOT PRESENT 06/07/2022 03:44 PM    Culture result:  06/07/2022 03:44 PM     Screening of patient nares for MRSA is for surveillance purposes and, if positive, to facilitate isolation considerations in high risk settings. It is not intended for automatic decolonization interventions per se as regimens are not sufficiently effective to warrant routine use.      Lab Results   Component Value Date/Time    TSH 1.88 09/08/2014 02:20 PM       Imaging:    CXR Results  (Last 48 hours)      None          Results from Hospital Encounter encounter on 01/21/23    XR CHEST PORT    Narrative  EXAM:  XR CHEST PORT    INDICATION: Eval for pneumonia    COMPARISON: Chest radiograph 6/4/2022    TECHNIQUE: Supine portable chest AP view    FINDINGS:    Cardiomediastinal silhouette is stably enlarged. Diffuse hazy interstitial  opacities suggestive of edema. Bandlike right upper lobe atelectasis. No  definite pleural effusion or pneumothorax. Impression  Diffuse hazy interstitial opacities suggestive of edema. Bandlike right upper  lobe atelectasis. Results from Hospital Encounter encounter on 05/23/22    XR CHEST PORT    Narrative  PORTABLE CHEST RADIOGRAPH/S: 6/4/2022 1:40 PM    INDICATION: Heart failure. COMPARISON: 6/2/2022, 5/23/2022, 5/3/2022. TECHNIQUE: Portable frontal semiupright radiograph/s of the chest.    FINDINGS:  A left PICC crosses midline and terminates in the right brachiocephalic or  internal jugular vein. There is pulmonary vascular congestion and probable  interstitial edema. The heart is enlarged, even given portable technique. The  central airways are patent. No pneumothorax. Impression  1. Left PICC crosses midline and terminates in the right brachiocephalic or IJV. 2. Cardiomegaly, pulmonary vascular congestion, probable interstitial edema. XR CHEST PORT    Narrative  EXAM: Portable CXR. 0949 hours. COMPARISON: 5/23/2022    INDICATION: short of breath    FINDINGS:  Lung volumes remain low. Bilateral nonspecific airspace disease/edema is  unchanged. Heart is borderline large. Left arm PICC line is stable. There is no  pneumothorax or midline shift. Impression  Stable disease. Results from [place] encounter on 05/23/22    CT HEAD WO CONT    Narrative  EXAM: CT HEAD WO CONT    INDICATION: 8/3/2020    COMPARISON: None. CONTRAST: None. TECHNIQUE: Unenhanced CT of the head was performed using 5 mm images. Brain and  bone windows were generated. Coronal and sagittal reformats.  CT dose reduction  was achieved through use of a standardized protocol tailored for this  examination and automatic exposure control for dose modulation. FINDINGS:  The ventricles and sulci are normal in size, shape and configuration. . There is  moderate white matter disease likely to chronic small vessel ischemic disease. .  There is no intracranial hemorrhage, extra-axial collection, or mass effect. The  basilar cisterns are open. No CT evidence of acute infarct. The bone windows demonstrate no abnormalities. The visualized portions of the  paranasal sinuses and mastoid air cells are clear. Impression  No evidence of acute process. Please note that this dictation was completed with [employer], the computer voice recognition software. Quite often unanticipated grammatical, syntax, homophones, and other interpretive errors are inadvertently transcribed by the computer software. Please disregard these errors. Please excuse any errors that have escaped final proofreading  ______________________________________________________________________      Thank you for allowing us to participate in the care of this patient.       This care involved high complexity medical decision making: I personally:  Reviewed the flowsheet and previous days notes  Reviewed and summarized records or history from previous days note or discussions with staff, family  High Risk Drug therapy requiring intensive monitoring for toxicity: eg steroids, antibiotics  Reviewed and/or ordered Clinical lab tests  Reviewed images and/or ordered Radiology tests  Reviewed and/or adjusted NiPPV settings  discussed my assessment/management with : Nursing, for coordination of care      Heri Carranza MD

## 2023-01-24 NOTE — PROGRESS NOTES
Transition of Care Plan:    RUR:19    TOM: 1/25-1/26  Barrier: low Hb, Hb stabilization, ECHO, GI and hematology clearance. Arturo Driscoll 53 with [facility] for [facility]. Visiting MD: Dr. Rod Rios. Last visit 1/14/23.   5:22 PM   CM sent updates to [facility] via [employer]. CM called DTR, [place]: [phone] and she would like MD to provide update tomorrow. If SNF or IPR: Date FOC offered:  Date FOC received:  Date authorization started with reference number:  Date authorization received and expires:  Accepting facility:    Follow up appointments: PCP: Rod Rios  DME needed: Pt has Hospital Bed and is bed bound  Transportation at Discharge: Stretcher transport will need to be arranged  Keys or means to access home:      DTR  IM Medicare Letter: 2nd  1/24  Is patient a  and connected with the [place]?   no             If yes, was [employer] transfer form completed and [facility] notified? Caregiver Contact:DTR, [place]: [phone]  Discharge Caregiver contacted prior to discharge? yes  Care Conference needed?:          no    CM will continue to monitor discharge plan.      Angel Luis Camarena, Neuro CM  Ext [phone]

## 2023-01-24 NOTE — PROGRESS NOTES
Received notification from bedside RN about patient with regards to: H/H 6.8/24.8  VS: /55, HR 83, RR 18, O2 sat 95% on CPAP    Intervention given: Transfuse 1 unit PRBC ordered.  Has orders for daily CBC

## 2023-01-24 NOTE — PROGRESS NOTES
ADULT PROTOCOL: JET AEROSOL ASSESSMENT    Patient  David Prajapati     60 y.o.   female     1/24/2023  10:18 AM    Breath Sounds Pre Procedure: Right Breath Sounds: Diminished                               Left Breath Sounds: Diminished    Breath Sounds Post Procedure: Right Breath Sounds: Diminished                                 Left Breath Sounds: Diminished    Breathing pattern: Pre procedure Breathing Pattern: Regular          Post procedure Breathing Pattern: Regular    Heart Rate: Pre procedure Pulse: 82           Post procedure Pulse: 82    Resp Rate: Pre procedure Respirations: 18           Post procedure Respirations: 18        Oxygen: O2 Device: Nasal cannula   O2 Flow Rate (L/min): 4 l/min         SpO2: Pre procedure SpO2: 93 %   with oxygen              Post procedure SpO2: 91 %  with oxygen    Nebulizer Therapy: Current medications Aerosolized Medications: DuoNeb      Changed: YES    Smoking History:   Social History     Tobacco Use   Smoking Status Former   Smokeless Tobacco Never       Problem List:   Patient Active Problem List   Diagnosis Code    COVID-19 ruled out Z20.822    A-fib ([facility] I48.91    S/P cardiac cath Z98.890    Mixed hyperlipidemia E78.2    Morbid obesity (HCC) E66.01    Acute respiratory failure ([facility]) J96.00    Atrial flutter with rapid ventricular response (HCC) I48.92    JOSLYN (obstructive sleep apnea) G47.33    Acute on chronic respiratory failure with hypoxia and hypercapnia ([facility]) J96.21, J96.22    SOB (shortness of breath) R06.02    Physical deconditioning R53.81    Counseling regarding advance care planning and goals of care Z71.89    Chronic pain G89.29    Dependence on supplemental oxygen Z99.81    Essential hypertension I10    Heart failure (HCC) I50.9    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

## 2023-01-24 NOTE — PROGRESS NOTES
End of Shift Note     Bedside shift change report given to Frannie Mendez RN (oncoming nurse) aCt Merchant RN (offgoing nurse). Report included the following information SBAR, Kardex, and Recent Results     Shift worked: [id number]   Shift summary and any significant changes:     Labs drawn. CO2 > 45, MD aware. Patient refused to get the heels elevated. Call bell and phone within reach of patient, able to make needs known.          Concerns for physician to address: none   Zone phone for oncoming shift:  [phone]      Patient Information  Sara Haji  60 y.o.  1/21/2023  7:27 PM by Aster Butterfield was admitted from Home     Problem List       Patient Active Problem List     Diagnosis Date Noted    Symptomatic anemia 01/22/2023    Anemia 06/02/2022    Pain in left hip 05/06/2022    Hip osteomyelitis ([address] 05/04/2022    Chronic pain 04/21/2022    Dependence on supplemental oxygen 04/21/2022    Essential hypertension 04/21/2022    Heart failure ([address] 04/21/2022    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    Sciatica 04/21/2022    Hypoxia 04/21/2022    COPD exacerbation ([address] 04/21/2022    CHF exacerbation ([address] 04/21/2022    SOB (shortness of breath) 12/04/2020    Physical deconditioning 12/04/2020    Counseling regarding advance care planning and goals of care 12/04/2020    Atrial flutter with rapid ventricular response ([address] 11/20/2020    JOSLYN (obstructive sleep apnea) 11/20/2020    Acute on chronic respiratory failure with hypoxia and hypercapnia ([address] 11/20/2020    Acute respiratory failure ([address] 11/18/2020    Mixed hyperlipidemia 11/15/2020    Morbid obesity ([address] 11/15/2020    A-fib ([address] 11/04/2020    S/P cardiac cath 11/04/2020    COVID-19 ruled out 07/24/2020           Past Medical History:   Diagnosis Date    Acute on chronic respiratory failure with hypoxia and hypercapnia ([address] 11/20/2020    Atrial fibrillation ([address] 11/4/2020    CHF (congestive heart failure) ([facility] Diabetes ([facility]      Hypertension      Ill-defined condition       high cholesterol    Ill-defined condition       tachycardia    JOSLYN (obstructive sleep apnea) 11/20/2020    Other and unspecified symptoms and signs involving general sensations and perceptions       ruptured disc    S/P cardiac cath 11/4/2020     11/3/2020 nonobstructive disease         Core Measures:  CVA: No No  CHF:Yes Yes  PNA:No No     Activity:  Activity Level: Bed Rest  Number times ambulated in hallways past shift: 0  Number of times OOB to chair past shift: 0     Cardiac:   Cardiac Monitoring: Yes      Cardiac Rhythm: Sinus Rhythm     Access:   Current line(s): PIV   Central Line? No     Genitourinary:   Urinary status: voiding and incontinent  Urinary Catheter? No      Respiratory:   O2 Device: CPAP mask  Chronic home O2 use?: YES  Incentive spirometer at bedside: NO     GI:  Last Bowel Movement Date: 01/20/23  Current diet:  ADULT DIET Dysphagia - Minced & Moist; 5 carb choices (75 gm/meal); meat  DIET ONE TIME MESSAGE  Passing flatus: YES  Tolerating current diet: YES     Pain Management:   Patient states pain is manageable on current regimen: YES     Skin:  Gaston Score: 12  Interventions: float heels, increase time out of bed, PT/OT consult, and internal/external urinary devices    Patient Safety:  Fall Score: Total Score: 2  Interventions: bed/chair alarm, assistive device (walker, cane, etc), gripper socks, and pt to call before getting OOB        DVT prophylaxis:  DVT prophylaxis Med- Yes  DVT prophylaxis SCD or SHANNON- No      Wounds: (If Applicable)  Wounds- Yes  Location sacrum and R large toe     Active Consults:  IP CONSULT TO GASTROENTEROLOGY  IP CONSULT TO PODIATRY     Length of Stay:  Expected LOS: - - -  Actual LOS: 2  Discharge Plan: Yes When medically stable.       Belinda Pritchett RN

## 2023-01-25 ENCOUNTER — APPOINTMENT (OUTPATIENT)
Dept: GENERAL RADIOLOGY | Age: 63
End: 2023-01-25
Attending: INTERNAL MEDICINE
Payer: MEDICARE

## 2023-01-25 ENCOUNTER — APPOINTMENT (OUTPATIENT)
Dept: NON INVASIVE DIAGNOSTICS | Age: 63
End: 2023-01-25
Attending: INTERNAL MEDICINE
Payer: MEDICARE

## 2023-01-25 LAB
ABO + RH BLD: NORMAL
ANION GAP SERPL CALC-SCNC: 1 MMOL/L (ref 5–15)
ARTERIAL PATENCY WRIST A: ABNORMAL
ARTERIAL PATENCY WRIST A: YES
ATRIAL RATE: 54 BPM
BASE EXCESS BLDA CALC-SCNC: 12.9 MMOL/L
BASE EXCESS BLDA CALC-SCNC: 14.1 MMOL/L
BASE EXCESS BLDV CALC-SCNC: 18.7 MMOL/L
BASOPHILS # BLD: 0 K/UL (ref 0–0.1)
BASOPHILS NFR BLD: 0 % (ref 0–1)
BDY SITE: ABNORMAL
BLD PROD TYP BPU: NORMAL
BLOOD GROUP ANTIBODIES SERPL: NORMAL
BPU ID: NORMAL
BUN SERPL-MCNC: 24 MG/DL (ref 6–20)
BUN/CREAT SERPL: 28 (ref 12–20)
CALCIUM SERPL-MCNC: 7.5 MG/DL (ref 8.5–10.1)
CALCULATED R AXIS, ECG10: 19 DEGREES
CALCULATED T AXIS, ECG11: 127 DEGREES
CHLORIDE SERPL-SCNC: 97 MMOL/L (ref 97–108)
CO2 SERPL-SCNC: 41 MMOL/L (ref 21–32)
COPPER SERPL-MCNC: 145 UG/DL (ref 80–158)
COVID-19 RAPID TEST, COVR: NOT DETECTED
CREAT SERPL-MCNC: 0.85 MG/DL (ref 0.55–1.02)
CROSSMATCH RESULT,%XM: NORMAL
DIAGNOSIS, 93000: NORMAL
DIFFERENTIAL METHOD BLD: ABNORMAL
ECHO AV AREA PEAK VELOCITY: 1.9 CM2
ECHO AV AREA PEAK VELOCITY: 1.9 CM2
ECHO AV AREA VTI: 1.9 CM2
ECHO AV AREA/BSA VTI: 0.8 CM2/M2
ECHO AV MEAN GRADIENT: 5 MMHG
ECHO AV MEAN VELOCITY: 1 M/S
ECHO AV PEAK GRADIENT: 11 MMHG
ECHO AV PEAK GRADIENT: 11 MMHG
ECHO AV PEAK VELOCITY: 1.6 M/S
ECHO AV PEAK VELOCITY: 1.7 M/S
ECHO AV VTI: 36.8 CM
ECHO LV EDV A2C: 43 ML
ECHO LV EDV A4C: 62 ML
ECHO LV EDV BP: 54 ML (ref 56–104)
ECHO LV EDV INDEX A4C: 28 ML/M2
ECHO LV EDV INDEX BP: 24 ML/M2
ECHO LV EDV NDEX A2C: 19 ML/M2
ECHO LV EJECTION FRACTION A2C: 36 %
ECHO LV EJECTION FRACTION A4C: 84 %
ECHO LV EJECTION FRACTION BIPLANE: 69 % (ref 55–100)
ECHO LV ESV A2C: 27 ML
ECHO LV ESV A4C: 10 ML
ECHO LV ESV BP: 17 ML (ref 19–49)
ECHO LV ESV INDEX A2C: 12 ML/M2
ECHO LV ESV INDEX A4C: 4 ML/M2
ECHO LV ESV INDEX BP: 8 ML/M2
ECHO LV FRACTIONAL SHORTENING: 43 % (ref 28–44)
ECHO LV INTERNAL DIMENSION DIASTOLE INDEX: 1.79 CM/M2
ECHO LV INTERNAL DIMENSION DIASTOLIC: 4 CM (ref 3.9–5.3)
ECHO LV INTERNAL DIMENSION SYSTOLIC INDEX: 1.03 CM/M2
ECHO LV INTERNAL DIMENSION SYSTOLIC: 2.3 CM
ECHO LV IVSD: 0.9 CM (ref 0.6–0.9)
ECHO LV MASS 2D: 101.4 G (ref 67–162)
ECHO LV MASS INDEX 2D: 45.3 G/M2 (ref 43–95)
ECHO LV POSTERIOR WALL DIASTOLIC: 0.8 CM (ref 0.6–0.9)
ECHO LV RELATIVE WALL THICKNESS RATIO: 0.4
ECHO LVOT AREA: 2.8 CM2
ECHO LVOT AV VTI INDEX: 0.67
ECHO LVOT DIAM: 1.9 CM
ECHO LVOT MEAN GRADIENT: 2 MMHG
ECHO LVOT PEAK GRADIENT: 5 MMHG
ECHO LVOT PEAK VELOCITY: 1.1 M/S
ECHO LVOT STROKE VOLUME INDEX: 31.2 ML/M2
ECHO LVOT SV: 70 ML
ECHO LVOT VTI: 24.7 CM
ECHO MV A VELOCITY: 0.63 M/S
ECHO MV AREA VTI: 2.2 CM2
ECHO MV E DECELERATION TIME (DT): 233.2 MS
ECHO MV E VELOCITY: 0.63 M/S
ECHO MV E/A RATIO: 1
ECHO MV LVOT VTI INDEX: 1.3
ECHO MV MAX VELOCITY: 0.9 M/S
ECHO MV MEAN GRADIENT: 1 MMHG
ECHO MV MEAN VELOCITY: 0.5 M/S
ECHO MV PEAK GRADIENT: 3 MMHG
ECHO MV VTI: 32.2 CM
ECHO PULMONARY ARTERY END DIASTOLIC PRESSURE: 8 MMHG
ECHO PV MAX VELOCITY: 1.1 M/S
ECHO PV PEAK GRADIENT: 5 MMHG
ECHO PV REGURGITANT MAX VELOCITY: 1.4 M/S
ECHO RV INTERNAL DIMENSION: 3.1 CM
EOSINOPHIL # BLD: 0 K/UL (ref 0–0.4)
EOSINOPHIL NFR BLD: 0 % (ref 0–7)
ERYTHROCYTE [DISTWIDTH] IN BLOOD BY AUTOMATED COUNT: 16.2 % (ref 11.5–14.5)
FIO2 ON VENT: 60 %
FLUAV AG NPH QL IA: NEGATIVE
FLUBV AG NOSE QL IA: NEGATIVE
GAS FLOW.O2 O2 DELIVERY SYS: 15 L/MIN
GAS FLOW.O2 SETTING OXYMISER: 16
GAS FLOW.O2 SETTING OXYMISER: 16
GLUCOSE BLD STRIP.AUTO-MCNC: 102 MG/DL (ref 65–117)
GLUCOSE BLD STRIP.AUTO-MCNC: 106 MG/DL (ref 65–117)
GLUCOSE BLD STRIP.AUTO-MCNC: 109 MG/DL (ref 65–117)
GLUCOSE BLD STRIP.AUTO-MCNC: 119 MG/DL (ref 65–117)
GLUCOSE BLD STRIP.AUTO-MCNC: 121 MG/DL (ref 65–117)
GLUCOSE SERPL-MCNC: 104 MG/DL (ref 65–100)
HCO3 BLDA-SCNC: 45 MMOL/L (ref 22–26)
HCO3 BLDA-SCNC: 47 MMOL/L (ref 22–26)
HCO3 BLDV-SCNC: 53 MMOL/L (ref 23–28)
HCT VFR BLD AUTO: 28.5 % (ref 35–47)
HGB BLD-MCNC: 8 G/DL (ref 11.5–16)
IMM GRANULOCYTES # BLD AUTO: 0.3 K/UL (ref 0–0.04)
IMM GRANULOCYTES NFR BLD AUTO: 2 % (ref 0–0.5)
IPAP/PIP, IPAPIP: 10
IPAP/PIP, IPAPIP: 10
LACTATE SERPL-SCNC: 1.4 MMOL/L (ref 0.4–2)
LYMPHOCYTES # BLD: 2 K/UL (ref 0.8–3.5)
LYMPHOCYTES NFR BLD: 12 % (ref 12–49)
MCH RBC QN AUTO: 28.9 PG (ref 26–34)
MCHC RBC AUTO-ENTMCNC: 28.1 G/DL (ref 30–36.5)
MCV RBC AUTO: 102.9 FL (ref 80–99)
MONOCYTES # BLD: 0.7 K/UL (ref 0–1)
MONOCYTES NFR BLD: 4 % (ref 5–13)
NEUTS SEG # BLD: 13.3 K/UL (ref 1.8–8)
NEUTS SEG NFR BLD: 82 % (ref 32–75)
NRBC # BLD: 0.09 K/UL (ref 0–0.01)
NRBC BLD-RTO: 0.6 PER 100 WBC
PCO2 BLDA: 104 MMHG (ref 35–45)
PCO2 BLDA: 105 MMHG (ref 35–45)
PCO2 BLDV: 122 MMHG (ref 41–51)
PEEP RESPIRATORY: 6
PEEP RESPIRATORY: 6
PH BLDA: 7.25 (ref 7.35–7.45)
PH BLDA: 7.27 (ref 7.35–7.45)
PH BLDV: 7.25 (ref 7.32–7.42)
PLATELET # BLD AUTO: 234 K/UL (ref 150–400)
PMV BLD AUTO: 11.6 FL (ref 8.9–12.9)
PO2 BLDA: 68 MMHG (ref 80–100)
PO2 BLDA: 74 MMHG (ref 80–100)
PO2 BLDV: 17 MMHG (ref 25–40)
POTASSIUM SERPL-SCNC: 4.1 MMOL/L (ref 3.5–5.1)
PROCALCITONIN SERPL-MCNC: 0.91 NG/ML
Q-T INTERVAL, ECG07: 356 MS
QRS DURATION, ECG06: 62 MS
QTC CALCULATION (BEZET), ECG08: 361 MS
RBC # BLD AUTO: 2.77 M/UL (ref 3.8–5.2)
RBC MORPH BLD: ABNORMAL
SAO2 % BLD: 89 % (ref 92–97)
SAO2 % BLD: 91 % (ref 92–97)
SAO2 % BLDV: 17 % (ref 65–88)
SAO2% DEVICE SAO2% SENSOR NAME: ABNORMAL
SERVICE CMNT-IMP: ABNORMAL
SERVICE CMNT-IMP: ABNORMAL
SERVICE CMNT-IMP: NORMAL
SODIUM SERPL-SCNC: 139 MMOL/L (ref 136–145)
SOURCE, COVRS: NORMAL
SPECIMEN EXP DATE BLD: NORMAL
SPECIMEN SITE: ABNORMAL
STATUS OF UNIT,%ST: NORMAL
UNIT DIVISION, %UDIV: 0
VENTRICULAR RATE, ECG03: 62 BPM
VT SETTING VENT: 350
VT SETTING VENT: 375
WBC # BLD AUTO: 16.3 K/UL (ref 3.6–11)
ZINC SERPL-MCNC: 52 UG/DL (ref 44–115)

## 2023-01-25 PROCEDURE — 87077 CULTURE AEROBIC IDENTIFY: CPT

## 2023-01-25 PROCEDURE — 93005 ELECTROCARDIOGRAM TRACING: CPT

## 2023-01-25 PROCEDURE — 74011250636 HC RX REV CODE- 250/636: Performed by: INTERNAL MEDICINE

## 2023-01-25 PROCEDURE — 94660 CPAP INITIATION&MGMT: CPT

## 2023-01-25 PROCEDURE — 87040 BLOOD CULTURE FOR BACTERIA: CPT

## 2023-01-25 PROCEDURE — 87635 SARS-COV-2 COVID-19 AMP PRB: CPT

## 2023-01-25 PROCEDURE — 77010033711 HC HIGH FLOW OXYGEN

## 2023-01-25 PROCEDURE — 99223 1ST HOSP IP/OBS HIGH 75: CPT | Performed by: NURSE PRACTITIONER

## 2023-01-25 PROCEDURE — 74011250637 HC RX REV CODE- 250/637: Performed by: INTERNAL MEDICINE

## 2023-01-25 PROCEDURE — 74011250637 HC RX REV CODE- 250/637: Performed by: STUDENT IN AN ORGANIZED HEALTH CARE EDUCATION/TRAINING PROGRAM

## 2023-01-25 PROCEDURE — 82803 BLOOD GASES ANY COMBINATION: CPT

## 2023-01-25 PROCEDURE — 74011000250 HC RX REV CODE- 250: Performed by: STUDENT IN AN ORGANIZED HEALTH CARE EDUCATION/TRAINING PROGRAM

## 2023-01-25 PROCEDURE — 94761 N-INVAS EAR/PLS OXIMETRY MLT: CPT

## 2023-01-25 PROCEDURE — 80048 BASIC METABOLIC PNL TOTAL CA: CPT

## 2023-01-25 PROCEDURE — 74011000250 HC RX REV CODE- 250: Performed by: INTERNAL MEDICINE

## 2023-01-25 PROCEDURE — 94640 AIRWAY INHALATION TREATMENT: CPT

## 2023-01-25 PROCEDURE — 93325 DOPPLER ECHO COLOR FLOW MAPG: CPT

## 2023-01-25 PROCEDURE — 86677 HELICOBACTER PYLORI ANTIBODY: CPT

## 2023-01-25 PROCEDURE — 65270000046 HC RM TELEMETRY

## 2023-01-25 PROCEDURE — 87804 INFLUENZA ASSAY W/OPTIC: CPT

## 2023-01-25 PROCEDURE — 84145 PROCALCITONIN (PCT): CPT

## 2023-01-25 PROCEDURE — 71045 X-RAY EXAM CHEST 1 VIEW: CPT

## 2023-01-25 PROCEDURE — 83605 ASSAY OF LACTIC ACID: CPT

## 2023-01-25 PROCEDURE — 74011000258 HC RX REV CODE- 258: Performed by: INTERNAL MEDICINE

## 2023-01-25 PROCEDURE — 87186 SC STD MICRODIL/AGAR DIL: CPT

## 2023-01-25 PROCEDURE — 82962 GLUCOSE BLOOD TEST: CPT

## 2023-01-25 PROCEDURE — C9399 UNCLASSIFIED DRUGS OR BIOLOG: HCPCS | Performed by: INTERNAL MEDICINE

## 2023-01-25 PROCEDURE — 86364 TISS TRNSGLTMNASE EA IG CLAS: CPT

## 2023-01-25 PROCEDURE — 36415 COLL VENOUS BLD VENIPUNCTURE: CPT

## 2023-01-25 PROCEDURE — 85025 COMPLETE CBC W/AUTO DIFF WBC: CPT

## 2023-01-25 PROCEDURE — 36600 WITHDRAWAL OF ARTERIAL BLOOD: CPT

## 2023-01-25 RX ORDER — DEXTROSE MONOHYDRATE 100 MG/ML
0-250 INJECTION, SOLUTION INTRAVENOUS AS NEEDED
Status: DISCONTINUED | OUTPATIENT
Start: 2023-01-25 | End: 2023-01-29 | Stop reason: SDUPTHER

## 2023-01-25 RX ORDER — IBUPROFEN 200 MG
4 TABLET ORAL AS NEEDED
Status: DISCONTINUED | OUTPATIENT
Start: 2023-01-25 | End: 2023-02-10 | Stop reason: HOSPADM

## 2023-01-25 RX ORDER — BUMETANIDE 0.25 MG/ML
1 INJECTION INTRAMUSCULAR; INTRAVENOUS
Status: COMPLETED | OUTPATIENT
Start: 2023-01-25 | End: 2023-01-25

## 2023-01-25 RX ORDER — NOREPINEPHRINE BITARTRATE/D5W 8 MG/250ML
.5-2 PLASTIC BAG, INJECTION (ML) INTRAVENOUS
Status: DISPENSED | OUTPATIENT
Start: 2023-01-25 | End: 2023-01-26

## 2023-01-25 RX ORDER — METOPROLOL TARTRATE 25 MG/1
12.5 TABLET, FILM COATED ORAL 2 TIMES DAILY
Status: DISCONTINUED | OUTPATIENT
Start: 2023-01-25 | End: 2023-02-10 | Stop reason: HOSPADM

## 2023-01-25 RX ORDER — FUROSEMIDE 10 MG/ML
20 INJECTION INTRAMUSCULAR; INTRAVENOUS 2 TIMES DAILY
Status: DISCONTINUED | OUTPATIENT
Start: 2023-01-25 | End: 2023-01-31

## 2023-01-25 RX ORDER — ALBUMIN HUMAN 250 G/1000ML
12.5 SOLUTION INTRAVENOUS ONCE
Status: DISCONTINUED | OUTPATIENT
Start: 2023-01-25 | End: 2023-01-25

## 2023-01-25 RX ORDER — INSULIN LISPRO 100 [IU]/ML
INJECTION, SOLUTION INTRAVENOUS; SUBCUTANEOUS EVERY 6 HOURS
Status: DISCONTINUED | OUTPATIENT
Start: 2023-01-25 | End: 2023-02-10 | Stop reason: HOSPADM

## 2023-01-25 RX ADMIN — Medication 1 AMPULE: at 21:21

## 2023-01-25 RX ADMIN — ALBUMIN HUMAN 12.5 G: 0.25 SOLUTION INTRAVENOUS at 16:13

## 2023-01-25 RX ADMIN — PANTOPRAZOLE SODIUM 40 MG: 40 TABLET, DELAYED RELEASE ORAL at 16:19

## 2023-01-25 RX ADMIN — SODIUM CHLORIDE, PRESERVATIVE FREE 10 ML: 5 INJECTION INTRAVENOUS at 13:12

## 2023-01-25 RX ADMIN — SODIUM CHLORIDE, PRESERVATIVE FREE 10 ML: 5 INJECTION INTRAVENOUS at 05:35

## 2023-01-25 RX ADMIN — ATORVASTATIN CALCIUM 10 MG: 10 TABLET, FILM COATED ORAL at 21:15

## 2023-01-25 RX ADMIN — METOPROLOL TARTRATE 25 MG: 25 TABLET, FILM COATED ORAL at 07:43

## 2023-01-25 RX ADMIN — NOREPINEPHRINE BITARTRATE 4 MCG/MIN: 1 INJECTION, SOLUTION, CONCENTRATE INTRAVENOUS at 17:30

## 2023-01-25 RX ADMIN — IPRATROPIUM BROMIDE AND ALBUTEROL SULFATE 3 ML: 2.5; .5 SOLUTION RESPIRATORY (INHALATION) at 00:04

## 2023-01-25 RX ADMIN — IRON SUCROSE 100 MG: 20 INJECTION, SOLUTION INTRAVENOUS at 07:43

## 2023-01-25 RX ADMIN — Medication 1 AMPULE: at 11:58

## 2023-01-25 RX ADMIN — FUROSEMIDE 20 MG: 10 INJECTION, SOLUTION INTRAMUSCULAR; INTRAVENOUS at 13:12

## 2023-01-25 RX ADMIN — CASTOR OIL AND BALSAM, PERU: 788; 87 OINTMENT TOPICAL at 21:16

## 2023-01-25 RX ADMIN — PANTOPRAZOLE SODIUM 40 MG: 40 TABLET, DELAYED RELEASE ORAL at 07:43

## 2023-01-25 RX ADMIN — ACETAZOLAMIDE 250 MG: 250 TABLET ORAL at 07:42

## 2023-01-25 RX ADMIN — CASTOR OIL AND BALSAM, PERU: 788; 87 OINTMENT TOPICAL at 07:44

## 2023-01-25 RX ADMIN — NAFCILLIN SODIUM 2 G: 2 INJECTION, POWDER, LYOPHILIZED, FOR SOLUTION INTRAMUSCULAR; INTRAVENOUS at 23:46

## 2023-01-25 RX ADMIN — ACETAMINOPHEN 650 MG: 325 TABLET ORAL at 00:18

## 2023-01-25 RX ADMIN — FOLIC ACID 1 MG: 1 TABLET ORAL at 07:42

## 2023-01-25 RX ADMIN — NAFCILLIN SODIUM 2 G: 2 INJECTION, POWDER, LYOPHILIZED, FOR SOLUTION INTRAMUSCULAR; INTRAVENOUS at 12:30

## 2023-01-25 RX ADMIN — NAFCILLIN SODIUM 2 G: 2 INJECTION, POWDER, LYOPHILIZED, FOR SOLUTION INTRAMUSCULAR; INTRAVENOUS at 21:15

## 2023-01-25 RX ADMIN — BUMETANIDE 1 MG: 0.25 INJECTION INTRAMUSCULAR; INTRAVENOUS at 07:43

## 2023-01-25 RX ADMIN — SODIUM CHLORIDE, PRESERVATIVE FREE 10 ML: 5 INJECTION INTRAVENOUS at 21:15

## 2023-01-25 RX ADMIN — NAFCILLIN SODIUM 2 G: 2 INJECTION, POWDER, LYOPHILIZED, FOR SOLUTION INTRAMUSCULAR; INTRAVENOUS at 16:18

## 2023-01-25 NOTE — PROGRESS NOTES
Hospitalist Progress Note    Subjective:   Daily Progress Note: 1/25/2023 11:10 AM    Hospital Course: Mae Keys is a 60 y.o.  female with pertinent past medical history of chronic hypoxemic respiratory failure on 4 L/min home oxygen therapy secondary to COPD, diabetes mellitus, essential hypertension, hyperlipidemia, JOSLYN compliant with CPAP, history duodenal ulcers, chronic anemia of indeterminate etiology who presents after routine lab work performed yesterday demonstrated hemoglobin of 5.3. Patient denies any sources of blood loss including hematochezia, melena, hematuria, hematemesis, epistaxis, bruising/bleeding. In the ED, patient afebrile and hemodynamically stable saturating upper 90s on baseline 4 L/min home oxygen therapy. Labs demonstrate: WBC 9.3, hemoglobin 5.3, platelets 448, sodium 140, potassium 4.4, chloride 95, bicarb 42 (chronic), glucose 152, BUN 17, creatinine 0.89, lactic acid 3.86 improved to 2.4 with IV fluids and 1 unit packed red blood cells. Fecal occult blood negative. UA not reflexed to culture. CXR demonstrates diffuse hazy interstitial opacities suggestive of edema with bandlike right upper lobe atelectasis. Patient was transfused with PRBC. GI consulted. GI recommends hematology evaluation. Deferred EGD at this time as patient doesn't have signs of overt bleeding and fecal occult blood test was negative. Subjective:  Patient was desaturating, she was on CPAP now changed to 15 L. Assessment / Plan:  Acute on chronic hypoxic respiratory failure  COPD  JOSLYN compliant with CPAP  Diastolic heart failure    -Worsening oxygen requirement 1/25, initially placed on 15 L, ABG reviewed hypercapnic, placed on BiPAP. Transferred to ICU.   Appreciate intensivist help  -Given Bumex IV once, further diuretics as per ICU team  -Follow chest x-ray  -Advised family to bring patient's Trelegy from home      Acute on chronic anemia:  Low suspicion for GI bleed  Will monitor hb  Appreciate GI consult  Appreciate hematology consult  Folic acid, iron, ferritin low  Start folic acid and iv iron. Hb low twas transfused with additional 1  unit of PRBC.   EGD/colonoscopy deferred at this time as patient is extremely high risk for anesthesia. GI recommends repeat ECHO to assess for pulmonary hypertension prior to EGD/colon. MSSA bacteremia-placed on nafcillin. Repeat cultures ordered by ICU  -Check echo to rule out endocarditis      Type II DM:  Continue lispro sliding scale    Paroxysmal afib  CAD:  Continue statin, metoprolol  Aspirin held. Obesity:  Complicates all aspect of care    40 or above Morbid obesity / Body mass index is 49.99 kg/m².     Code status: Full  Prophylaxis: SCD's  Recommended Disposition: Home w/Family         Current Facility-Administered Medications   Medication Dose Route Frequency    nafcillin (NALLPEN) 2 g in 0.9% sodium chloride (MBP/ADV) 100 mL MBP  2 g IntraVENous Q4H    alcohol 62% (NOZIN) nasal  1 Ampule  1 Ampule Topical Q12H    insulin lispro (HUMALOG) injection   SubCUTAneous Q6H    glucose chewable tablet 16 g  4 Tablet Oral PRN    glucagon (GLUCAGEN) injection 1 mg  1 mg IntraMUSCular PRN    dextrose 10% infusion 0-250 mL  0-250 mL IntraVENous PRN    furosemide (LASIX) injection 20 mg  20 mg IntraVENous BID    metoprolol tartrate (LOPRESSOR) tablet 12.5 mg  12.5 mg Oral BID    folic acid (FOLVITE) tablet 1 mg  1 mg Oral DAILY    iron sucrose (VENOFER) injection 100 mg  100 mg IntraVENous DAILY    albuterol-ipratropium (DUO-NEB) 2.5 MG-0.5 MG/3 ML  3 mL Nebulization Q4H PRN    atorvastatin (LIPITOR) tablet 10 mg  10 mg Oral QHS    pantoprazole (PROTONIX) tablet 40 mg  40 mg Oral ACB&D    sodium chloride (NS) flush 5-40 mL  5-40 mL IntraVENous Q8H    sodium chloride (NS) flush 5-40 mL  5-40 mL IntraVENous PRN    acetaminophen (TYLENOL) tablet 650 mg  650 mg Oral Q6H PRN    Or    acetaminophen (TYLENOL) suppository 650 mg  650 mg Rectal Q6H PRN    polyethylene glycol (MIRALAX) packet 17 g  17 g Oral DAILY PRN    ondansetron (ZOFRAN ODT) tablet 4 mg  4 mg Oral Q8H PRN    Or    ondansetron (ZOFRAN) injection 4 mg  4 mg IntraVENous Q6H PRN    dextrose 10% infusion 0-250 mL  0-250 mL IntraVENous PRN    balsam peru-castor oiL (VENELEX) ointment   Topical BID        Review of Systems  Constitutional: No fevers, No chills, No sweats, No fatigue, No Weakness  Eyes: No redness  Ears, nose, mouth, throat, and face: No nasal congestion, No sore throat, No voice change  Respiratory: improving  Shortness of Breath, No cough, No wheezing  Cardiovascular: No chest pain, No palpitations, No extremity edema  Gastrointestinal: No nausea, No vomiting, No diarrhea, No abdominal pain  Genitourinary: No frequency, No dysuria, No hematuria  Integument/breast: No skin lesion(s)   Neurological: No Confusion, No headaches, No dizziness      Objective:     Visit Vitals  /60   Pulse 65   Temp 97.8 °F (36.6 °C)   Resp 22   Ht 5' 3\" (1.6 m)   Wt 128 kg (282 lb 3 oz)   SpO2 91%   Breastfeeding No   BMI 49.99 kg/m²    O2 Flow Rate (L/min): 15 l/min O2 Device: BIPAP    Temp (24hrs), Av.2 °F (36.8 °C), Min:97.5 °F (36.4 °C), Max:99 °F (37.2 °C)      No intake/output data recorded.  1901 -  0700  In: 406.7   Out: 1200 [Urine:1200]    PHYSICAL EXAM:  Constitutional: No acute distress, morbidly obese. Skin: Extremities and face reveal no rashes. HEENT: Sclerae anicteric. Extra-occular muscles are intact. No oral ulcers. The neck is supple and no masses. Cardiovascular: Regular rate and rhythm. Respiratory: B/L decrease air entry  GI: Abdomen nondistended, soft, and nontender. Normal active bowel sounds. Musculoskeletal: No pitting edema of the lower legs. Able to move all ext. Dressing present on right big toe. B/L pitting edema  Neurological:  Patient is alert and oriented.  Cranial nerves II-XII grossly intact  Psychiatric: Mood appears appropriate Data Review    Recent Results (from the past 24 hour(s))   HGB & HCT    Collection Time: 01/24/23  4:17 PM   Result Value Ref Range    HGB 8.7 (L) 11.5 - 16.0 g/dL    HCT 31.3 (L) 35.0 - 47.0 %   GLUCOSE, POC    Collection Time: 01/24/23  4:25 PM   Result Value Ref Range    Glucose (POC) 109 65 - 117 mg/dL    Performed by Aneudy Lake    GLUCOSE, POC    Collection Time: 01/24/23 10:40 PM   Result Value Ref Range    Glucose (POC) 153 (H) 65 - 117 mg/dL    Performed by Lori Saliva (PCT)    CBC WITH AUTOMATED DIFF    Collection Time: 01/25/23  5:56 AM   Result Value Ref Range    WBC 16.3 (H) 3.6 - 11.0 K/uL    RBC 2.77 (L) 3.80 - 5.20 M/uL    HGB 8.0 (L) 11.5 - 16.0 g/dL    HCT 28.5 (L) 35.0 - 47.0 %    .9 (H) 80.0 - 99.0 FL    MCH 28.9 26.0 - 34.0 PG    MCHC 28.1 (L) 30.0 - 36.5 g/dL    RDW 16.2 (H) 11.5 - 14.5 %    PLATELET 714 000 - 076 K/uL    MPV 11.6 8.9 - 12.9 FL    NRBC 0.6 (H) 0  WBC    ABSOLUTE NRBC 0.09 (H) 0.00 - 0.01 K/uL    NEUTROPHILS 82 (H) 32 - 75 %    LYMPHOCYTES 12 12 - 49 %    MONOCYTES 4 (L) 5 - 13 %    EOSINOPHILS 0 0 - 7 %    BASOPHILS 0 0 - 1 %    IMMATURE GRANULOCYTES 2 (H) 0.0 - 0.5 %    ABS. NEUTROPHILS 13.3 (H) 1.8 - 8.0 K/UL    ABS. LYMPHOCYTES 2.0 0.8 - 3.5 K/UL    ABS. MONOCYTES 0.7 0.0 - 1.0 K/UL    ABS. EOSINOPHILS 0.0 0.0 - 0.4 K/UL    ABS. BASOPHILS 0.0 0.0 - 0.1 K/UL    ABS. IMM.  GRANS. 0.3 (H) 0.00 - 0.04 K/UL    DF SMEAR SCANNED      RBC COMMENTS ANISOCYTOSIS  1+        RBC COMMENTS MACROCYTOSIS  1+        RBC COMMENTS STOMATOCYTES  PRESENT       METABOLIC PANEL, BASIC    Collection Time: 01/25/23  5:56 AM   Result Value Ref Range    Sodium 139 136 - 145 mmol/L    Potassium 4.1 3.5 - 5.1 mmol/L    Chloride 97 97 - 108 mmol/L    CO2 41 (HH) 21 - 32 mmol/L    Anion gap 1 (L) 5 - 15 mmol/L    Glucose 104 (H) 65 - 100 mg/dL    BUN 24 (H) 6 - 20 MG/DL    Creatinine 0.85 0.55 - 1.02 MG/DL    BUN/Creatinine ratio 28 (H) 12 - 20      eGFR >60 >60 ml/min/1.73m2 Calcium 7.5 (L) 8.5 - 10.1 MG/DL   GLUCOSE, POC    Collection Time: 01/25/23  6:13 AM   Result Value Ref Range    Glucose (POC) 102 65 - 117 mg/dL    Performed by Arlette Burrows (PCT)    GLUCOSE, POC    Collection Time: 01/25/23  8:05 AM   Result Value Ref Range    Glucose (POC) 109 65 - 117 mg/dL    Performed by James Washington PCT    BLOOD GAS, ARTERIAL    Collection Time: 01/25/23  8:08 AM   Result Value Ref Range    pH 7.25 (L) 7.35 - 7.45      PCO2 105 (H) 35 - 45 mmHg    PO2 74 (L) 80 - 100 mmHg    O2 SAT 91 (L) 92 - 97 %    BICARBONATE 45 (H) 22 - 26 mmol/L    BASE EXCESS 12.9 mmol/L    O2 METHOD NASAL CANNULA      O2 FLOW RATE 15.00 L/min    Sample source ARTERIAL      SITE LEFT RADIAL      ELIA'S TEST NOT APPLICABLE     GLUCOSE, POC    Collection Time: 01/25/23 12:02 PM   Result Value Ref Range    Glucose (POC) 121 (H) 65 - 117 mg/dL    Performed by Leonel Acevedo RN    BLOOD GAS, ARTERIAL    Collection Time: 01/25/23 12:07 PM   Result Value Ref Range    pH 7.27 (L) 7.35 - 7.45      PCO2 104 (H) 35 - 45 mmHg    PO2 68 (L) 80 - 100 mmHg    O2 SAT 89 (L) 92 - 97 %    BICARBONATE 47 (H) 22 - 26 mmol/L    BASE EXCESS 14.1 mmol/L    O2 METHOD BLEED IN      Tidal volume 350.0      SET RATE 16      IPAP/PIP 10      PEEP/CPAP 6.0      Sample source ARTERIAL      SITE RIGHT RADIAL      ELIA'S TEST YES                   Time spent 35 minutes involving direct patient care as well as reviewing patient's labs and coordination of care with nursing staff     Care Plan discussed with: Patient/Family/RN/Case Management        Total time spent with patient: 35 minutes.

## 2023-01-25 NOTE — PROGRESS NOTES
Hematology Oncology Progress Note    Follow up for: anemia    Chart notes reviewed since last visit. Case discussed with following: . Patient complains of the following: Events overnight noted, now in ICU with Resp distress     Additional concerns noted by the staff:     Patient Vitals for the past 24 hrs:   BP Temp Pulse Resp SpO2   01/25/23 0829 -- -- -- -- 100 %   01/25/23 0800 110/60 97.8 °F (36.6 °C) 65 22 99 %   01/25/23 0421 119/60 97.5 °F (36.4 °C) 65 22 95 %   01/25/23 0031 -- -- -- -- 98 %   01/25/23 0028 (!) 111/49 98.1 °F (36.7 °C) 66 -- 99 %   01/25/23 0004 -- -- -- -- 95 %   01/25/23 0003 (!) 118/53 98.1 °F (36.7 °C) 67 14 (!) 89 %   01/24/23 2329 -- -- 67 -- 92 %   01/24/23 2327 (!) 108/49 98.8 °F (37.1 °C) 63 18 90 %   01/24/23 2118 -- -- -- -- 100 %   01/24/23 2116 (!) 117/52 97.9 °F (36.6 °C) 76 14 100 %   01/24/23 2033 (!) 119/54 98.1 °F (36.7 °C) 79 22 (!) 78 %   01/24/23 1623 (!) 112/48 98.8 °F (37.1 °C) 69 20 (!) 89 %   01/24/23 1500 (!) 120/52 98.2 °F (36.8 °C) 69 24 90 %   01/24/23 1321 (!) 109/52 99 °F (37.2 °C) 70 18 90 %         Review of Systems:  Unable to be obtained due to patient's condition    Physical Examination:  Gen some distress  Resp some distress on BIPAP      Labs:  Recent Results (from the past 24 hour(s))   GLUCOSE, POC    Collection Time: 01/24/23 11:51 AM   Result Value Ref Range    Glucose (POC) 96 65 - 117 mg/dL    Performed by Jose Addison    HAPTOGLOBIN    Collection Time: 01/24/23 12:30 PM   Result Value Ref Range    Haptoglobin 197 30 - 200 mg/dL   SAMPLES BEING HELD    Collection Time: 01/24/23 12:30 PM   Result Value Ref Range    SAMPLES BEING HELD RED     COMMENT        Add-on orders for these samples will be processed based on acceptable specimen integrity and analyte stability, which may vary by analyte.    HGB & HCT    Collection Time: 01/24/23  4:17 PM   Result Value Ref Range    HGB 8.7 (L) 11.5 - 16.0 g/dL    HCT 31.3 (L) 35.0 - 47.0 %   GLUCOSE, POC Collection Time: 01/24/23  4:25 PM   Result Value Ref Range    Glucose (POC) 109 65 - 117 mg/dL    Performed by Sky Stinson    GLUCOSE, POC    Collection Time: 01/24/23 10:40 PM   Result Value Ref Range    Glucose (POC) 153 (H) 65 - 117 mg/dL    Performed by Charis Vargas (PCT)    CBC WITH AUTOMATED DIFF    Collection Time: 01/25/23  5:56 AM   Result Value Ref Range    WBC 16.3 (H) 3.6 - 11.0 K/uL    RBC 2.77 (L) 3.80 - 5.20 M/uL    HGB 8.0 (L) 11.5 - 16.0 g/dL    HCT 28.5 (L) 35.0 - 47.0 %    .9 (H) 80.0 - 99.0 FL    MCH 28.9 26.0 - 34.0 PG    MCHC 28.1 (L) 30.0 - 36.5 g/dL    RDW 16.2 (H) 11.5 - 14.5 %    PLATELET 100 970 - 922 K/uL    MPV 11.6 8.9 - 12.9 FL    NRBC 0.6 (H) 0  WBC    ABSOLUTE NRBC 0.09 (H) 0.00 - 0.01 K/uL    NEUTROPHILS 82 (H) 32 - 75 %    LYMPHOCYTES 12 12 - 49 %    MONOCYTES 4 (L) 5 - 13 %    EOSINOPHILS 0 0 - 7 %    BASOPHILS 0 0 - 1 %    IMMATURE GRANULOCYTES 2 (H) 0.0 - 0.5 %    ABS. NEUTROPHILS 13.3 (H) 1.8 - 8.0 K/UL    ABS. LYMPHOCYTES 2.0 0.8 - 3.5 K/UL    ABS. MONOCYTES 0.7 0.0 - 1.0 K/UL    ABS. EOSINOPHILS 0.0 0.0 - 0.4 K/UL    ABS. BASOPHILS 0.0 0.0 - 0.1 K/UL    ABS. IMM.  GRANS. 0.3 (H) 0.00 - 0.04 K/UL    DF SMEAR SCANNED      RBC COMMENTS ANISOCYTOSIS  1+        RBC COMMENTS MACROCYTOSIS  1+        RBC COMMENTS STOMATOCYTES  PRESENT       METABOLIC PANEL, BASIC    Collection Time: 01/25/23  5:56 AM   Result Value Ref Range    Sodium 139 136 - 145 mmol/L    Potassium 4.1 3.5 - 5.1 mmol/L    Chloride 97 97 - 108 mmol/L    CO2 41 (HH) 21 - 32 mmol/L    Anion gap 1 (L) 5 - 15 mmol/L    Glucose 104 (H) 65 - 100 mg/dL    BUN 24 (H) 6 - 20 MG/DL    Creatinine 0.85 0.55 - 1.02 MG/DL    BUN/Creatinine ratio 28 (H) 12 - 20      eGFR >60 >60 ml/min/1.73m2    Calcium 7.5 (L) 8.5 - 10.1 MG/DL   GLUCOSE, POC    Collection Time: 01/25/23  6:13 AM   Result Value Ref Range    Glucose (POC) 102 65 - 117 mg/dL    Performed by Charis Vargas (PCT)    GLUCOSE, POC    Collection Time: 01/25/23  8:05 AM   Result Value Ref Range    Glucose (POC) 109 65 - 117 mg/dL    Performed by [facility]    BLOOD GAS, ARTERIAL    Collection Time: 01/25/23  8:08 AM   Result Value Ref Range    pH 7.25 (L) 7.35 - 7.45      PCO2 105 (H) 35 - 45 mmHg    PO2 74 (L) 80 - 100 mmHg    O2 SAT 91 (L) 92 - 97 %    BICARBONATE 45 (H) 22 - 26 mmol/L    BASE EXCESS 12.9 mmol/L    O2 METHOD NASAL CANNULA      O2 FLOW RATE 15.00 L/min    Sample source ARTERIAL      SITE LEFT RADIAL      ELIA'S TEST NOT APPLICABLE         Assessment and Plan:   Anemia  -folate and iron deficiency, started on folic acid and IV iron  -transfuse for HBG < 7  -check haptoglobin for any sign of hemolysis since LDH and retic up slightly  -GI following, needing to optimise resp status before any attempt at EGD/colonoscopy    Resp distress  -now in ICU on BIPAP  -plan per ICU doc

## 2023-01-25 NOTE — PROGRESS NOTES
End of Shift Note     Bedside shift change report given to MARIE Casillas (oncoming nurse) by Leena Kenyon RN (offgoing nurse). Report included the following information SBAR, Kardex, and Recent Results     Shift worked: night   Shift summary and any significant changes:     Very eventful night, patient kept desaturating, RT and Rapid Nurse informed, both were on the unit to assess patient. Patient is now on high flow and saturating above 94%. Patient refused to get the heels elevated. Call bell and phone within reach of patient, able to make needs known. CHG bathed, turned regularly, excoriation on groin, abdominal folds, crack in the gluteal folds, ointment applied.          Concerns for physician to address: none   Zone phone for oncoming shift:  [phone]      Patient Information  Elda Luna  60 y.o.  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    Sciatica 04/21/2022    Hypoxia 04/21/2022    COPD exacerbation ([address] 04/21/2022    CHF exacerbation ([address] 04/21/2022    SOB (shortness of breath) 12/04/2020    Physical deconditioning 12/04/2020    Counseling regarding advance care planning and goals of care 12/04/2020    Atrial flutter with rapid ventricular response ([address] 11/20/2020    JOSLYN (obstructive sleep apnea) 11/20/2020    Acute on chronic respiratory failure with hypoxia and hypercapnia ([address] 11/20/2020    Acute respiratory failure ([address] 11/18/2020    Mixed hyperlipidemia 11/15/2020    Morbid obesity ([address] 11/15/2020    A-fib ([address] 11/04/2020    S/P cardiac cath 11/04/2020 COVID-19 ruled out 07/24/2020           Past Medical History:   Diagnosis Date    Acute on chronic respiratory failure with hypoxia and hypercapnia (HCC) 11/20/2020    Atrial fibrillation ([facility] 11/4/2020    CHF (congestive heart failure) ([facility]      Diabetes ([facility]      Hypertension      Ill-defined condition       high cholesterol    Ill-defined condition       tachycardia    JOSLYN (obstructive sleep apnea) 11/20/2020    Other and unspecified symptoms and signs involving general sensations and perceptions       ruptured disc    S/P cardiac cath 11/4/2020     11/3/2020 nonobstructive disease         Core Measures:  CVA: No No  CHF:Yes Yes  PNA:No No     Activity:  Activity Level: Bed Rest  Number times ambulated in hallways past shift: 0  Number of times OOB to chair past shift: 0     Cardiac:   Cardiac Monitoring: Yes      Cardiac Rhythm: Sinus Rhythm     Access:   Current line(s): PIV   Central Line? No     Genitourinary:   Urinary status: voiding and incontinent  Urinary Catheter? No      Respiratory:   O2 Device: CPAP mask  Chronic home O2 use?: YES  Incentive spirometer at bedside: NO     GI:  Last Bowel Movement Date: 01/20/23  Current diet:  ADULT DIET Dysphagia - Minced & Moist; 5 carb choices (75 gm/meal); meat  DIET ONE TIME MESSAGE  Passing flatus: YES  Tolerating current diet: YES     Pain Management:   Patient states pain is manageable on current regimen: YES     Skin:  Gaston Score: 12  Interventions: float heels, increase time out of bed, PT/OT consult, and internal/external urinary devices    Patient Safety:  Fall Score:  Total Score: 2  Interventions: bed/chair alarm, assistive device (walker, cane, etc), gripper socks, and pt to call before getting OOB        DVT prophylaxis:  DVT prophylaxis Med- Yes  DVT prophylaxis SCD or SHANNON- No      Wounds: (If Applicable)  Wounds- Yes  Location sacrum and R large toe     Active Consults:  IP CONSULT TO GASTROENTEROLOGY  IP CONSULT TO PODIATRY     Length of Stay:  Expected LOS: - - -  Actual LOS: 4  Discharge Plan: Yes When medically stable.       Zia Lopez RN

## 2023-01-25 NOTE — PROGRESS NOTES
Spiritual Care Assessment/Progress Note  [facility]      NAME: Hunter Lazo      MRN: [id number]  AGE: 60 y.o. SEX: female  Jehovah's witness Affiliation: Carolyni   Language: English     1/25/2023     Total Time (in minutes): 7     Spiritual Assessment begun in [facility] 3 through conversation with:         []Patient        [] Family    [] Friend(s)        Reason for Consult: Palliative Care, Initial/Spiritual Assessment     Spiritual beliefs: (Please include comment if needed)     [] Identifies with a rosalina tradition:         [] Supported by a rosalina community:            [] Claims no spiritual orientation:           [] Seeking spiritual identity:                [] Adheres to an individual form of spirituality:           [x] Not able to assess:                           Identified resources for coping:      [] Prayer                               [] Music                  [] Guided Imagery     [] Family/friends                 [] Pet visits     [] Devotional reading                         [] Unknown     [] Other:                                                Interventions offered during this visit: (See comments for more details)    Patient Interventions: Other (comment) (Attempt)           Plan of Care:     [] Support spiritual and/or cultural needs    [] Support AMD and/or advance care planning process      [] Support grieving process   [] Coordinate Rites and/or Rituals    [] Coordination with community clergy   [] No spiritual needs identified at this time   [] Detailed Plan of Care below (See Comments)  [] Make referral to Music Therapy  [] Make referral to Pet Therapy     [] Make referral to Addiction services  [] Make referral to [facility]  [] Make referral to Spiritual Care Partner  [] No future visits requested        [x] Contact Spiritual Care for further referrals     Comments:   attemppted palliative care initial spiritual assessment in  CCU.  Patient appeared to be sleeping peacefully, no family present. Please contact spiritual care for any further referrals.        Visited by: Madie Carbajal  To page : [phone]  [phone]

## 2023-01-25 NOTE — PROGRESS NOTES
0930 patient arrived to unit, transfer from [facility] with primary nurse and RRT nurse Mulu Hernandez. Bedside report completed with primary RN. Patient arrived on Bipap , bp soft, bradycardic. Pt is arousable but drowsy. 1030 EKG completed, see chart. MD reviewed. 200 Dr Martinez Running at bedside, [facility] settings adjusted, ABG in 30 min. 1200 ABG obtained. MD aware of results. La and procal sent. 1400 pt is more alert, answers orientation questions appropriately . Unable to complete chest pt due to bed malfunctions, needs new bed, none available at this time. 1545 VBG collected and sent to RT, MD aware of results. Continue to monitor. 1800 EVS called for new bed, no icu beds available at this time. 1900 bedside report given to [facility].

## 2023-01-25 NOTE — PROGRESS NOTES
Problem: Pressure Injury - Risk of  Goal: *Prevention of pressure injury  Description: Document Gaston Scale and appropriate interventions in the flowsheet. Outcome: Progressing Towards Goal  Note: Pressure Injury Interventions:  Sensory Interventions: Assess need for specialty bed, Float heels, Keep linens dry and wrinkle-free, Minimize linen layers, Turn and reposition approx. every two hours (pillows and wedges if needed)    Moisture Interventions: Apply protective barrier, creams and emollients, Absorbent underpads, Minimize layers    Activity Interventions: PT/OT evaluation, Pressure redistribution bed/mattress(bed type)    Mobility Interventions: HOB 30 degrees or less, PT/OT evaluation, Turn and reposition approx. every two hours(pillow and wedges)    Nutrition Interventions: Document food/fluid/supplement intake, Discuss nutritional consult with provider    Friction and Shear Interventions: HOB 30 degrees or less, Lift team/patient mobility team, Minimize layers                Problem: Falls - Risk of  Goal: *Absence of Falls  Description: Document Anna Fall Risk and appropriate interventions in the flowsheet.   Outcome: Progressing Towards Goal  Note: Fall Risk Interventions:  Mobility Interventions: OT consult for ADLs, PT Consult for mobility concerns, PT Consult for assist device competence         Medication Interventions: Bed/chair exit alarm, Patient to call before getting OOB    Elimination Interventions: Call light in reach

## 2023-01-25 NOTE — PROGRESS NOTES
Problem: Pressure Injury - Risk of  Goal: *Prevention of pressure injury  Description: Document Gaston Scale and appropriate interventions in the flowsheet. Outcome: Progressing Towards Goal  Note: Pressure Injury Interventions:  Sensory Interventions: Assess changes in LOC    Moisture Interventions: Absorbent underpads    Activity Interventions: PT/OT evaluation    Mobility Interventions: HOB 30 degrees or less    Nutrition Interventions: Document food/fluid/supplement intake    Friction and Shear Interventions: HOB 30 degrees or less                Problem: Patient Education: Go to Patient Education Activity  Goal: Patient/Family Education  Outcome: Progressing Towards Goal     Problem: Falls - Risk of  Goal: *Absence of Falls  Description: Document Anna Fall Risk and appropriate interventions in the flowsheet.   Outcome: Progressing Towards Goal  Note: Fall Risk Interventions:  Mobility Interventions: Bed/chair exit alarm         Medication Interventions: Bed/chair exit alarm    Elimination Interventions: Bed/chair exit alarm, Call light in reach              Problem: Patient Education: Go to Patient Education Activity  Goal: Patient/Family Education  Outcome: Progressing Towards Goal     Problem: Heart Failure: Day 1  Goal: Off Pathway (Use only if patient is Off Pathway)  Outcome: Progressing Towards Goal  Goal: Activity/Safety  Outcome: Progressing Towards Goal  Goal: Consults, if ordered  Outcome: Progressing Towards Goal  Goal: Diagnostic Test/Procedures  Outcome: Progressing Towards Goal  Goal: Nutrition/Diet  Outcome: Progressing Towards Goal  Goal: Discharge Planning  Outcome: Progressing Towards Goal  Goal: Medications  Outcome: Progressing Towards Goal  Goal: Respiratory  Outcome: Progressing Towards Goal  Goal: Treatments/Interventions/Procedures  Outcome: Progressing Towards Goal  Goal: Psychosocial  Outcome: Progressing Towards Goal  Goal: *Oxygen saturation within defined limits  Outcome: Progressing Towards Goal  Goal: *Hemodynamically stable  Outcome: Progressing Towards Goal  Goal: *Optimal pain control at patient's stated goal  Outcome: Progressing Towards Goal  Goal: *Anxiety reduced or absent  Outcome: Progressing Towards Goal     Problem: Heart Failure: Day 2  Goal: Off Pathway (Use only if patient is Off Pathway)  Outcome: Progressing Towards Goal  Goal: Activity/Safety  Outcome: Progressing Towards Goal  Goal: Consults, if ordered  Outcome: Progressing Towards Goal  Goal: Diagnostic Test/Procedures  Outcome: Progressing Towards Goal  Goal: Nutrition/Diet  Outcome: Progressing Towards Goal  Goal: Discharge Planning  Outcome: Progressing Towards Goal  Goal: Medications  Outcome: Progressing Towards Goal  Goal: Respiratory  Outcome: Progressing Towards Goal  Goal: Treatments/Interventions/Procedures  Outcome: Progressing Towards Goal  Goal: Psychosocial  Outcome: Progressing Towards Goal  Goal: *Oxygen saturation within defined limits  Outcome: Progressing Towards Goal  Goal: *Hemodynamically stable  Outcome: Progressing Towards Goal  Goal: *Optimal pain control at patient's stated goal  Outcome: Progressing Towards Goal  Goal: *Anxiety reduced or absent  Outcome: Progressing Towards Goal  Goal: *Demonstrates progressive activity  Outcome: Progressing Towards Goal

## 2023-01-25 NOTE — PROGRESS NOTES
Rapid Response Report    0980 (See flow sheet). Called by nursing supervisor to check on pt since a RRT was called overnight. Possibly need to tx to higher level of care. Pt appears calm, cooperative and in no distress. HFL NC @15L, SPO2 94-99%. Temp 97.9, 65, 22, 100/60. (MEWS 2)  Resp therapy at [facility]. ABG drawn, waiting for results. [id number]  Call received from resp therapist with ABG results. CO2 >130, PaO2 70's, SPO2 91%. Nursing supervisor informed of BiPAP needs. Tx to CCU 2514 started    0840  Primary nurse attempt to call report. Pt placed on portable monitor to transport. Nurse unable to give report at this time. [facility] spoke to receiving nurse. Transported pt to CCU 2514. Settled into bed and assisted with bedside care. Verbal BS report by primary to receiving nurse.        4189  Closing call:  VS 97.9,71,19,110/66, 95%% Bi-PAP  MEWS score 2

## 2023-01-25 NOTE — CONSULTS
Palliative Medicine Consult  Miguel A: [phone] ([phone])    Patient Name: Yannick Leon  YOB: 1960    Today's date:  1/25/23  Date of Initial Consult: 1/25/23  Reason for Consult: End Stage Disease  Requesting Provider: Corine Ganser, MD   Primary Care Physician: Unknown, Provider     SUMMARY:   Yannick Leon is a 60 y.o. with a past history of morbid obesity, DM2, CHF,  bed bound for 2 years, on 4 L of O2, COPD, CHF, morbid obesity, p. Afib, chronic pain, nonambulatory at baseline, JOSLYN compliatn with CPAP, h/o duodenal ulcers, who was admitted on 1/21/2023 from home with a diagnosis of symptomatic anemia. HPI:  pt was sent to ED by her PCP for low h/h. Pt reports feeling fatigued but denies CP, SOB, no other symptoms, no bleeding in stool or urine. Admission course: hgb was 5.2 on admission that improved to 7.0 following 1 unit prbcs, hypotensive. 1/22: underwent avulsion of toenail off right great toe. Current medical issues leading to Palliative Medicine involvement include: poor prognosis. 1/24: continues no bleeding and negative fecal occult blood test, ASA-4 very high risk, if she has cancer, not a candidate for aggressive measures. 1/25: RRT called this am for respiratory failure, on HFNC02 at 15 liters, due to C02>130, Pa02 70s, Sp02 91%, bipap started, transferred to ICU. Blood cultures positive for staph aureus, abx will be started today. 3 units prbcs given during this admission so far. Psychosocial: bed bound for 3 years. Home 02 3-4L.   Lives with dtr and requires assists with ADLs/IADLs   PALLIATIVE DIAGNOSES:   Acute on chronic Anemia, suspect UGIB (stool neg for occult blood), history of PUD on EGD June 2022  Hypotension  Chronic hypoxic respiratory failure, COPD, JOSLYN on CPAP  Paroxysmal afib  Severe Diastolic HF  Edema   Wounds  Right great toenail falling off due to a cat scratch  Care decisions     PLAN:   Prior to visit, I completed an extensive review of patient's medical records, including medical documentation, vital signs, MARs, and results of various labs and other diagnostics. I also spoke with patient's nurse, [place] RN and pulmonologist/intensivist Dr. Gustavo Peck. Spoke with dtr Arthur (primary mPOA/daughter): discussed pt's current condition, concerns of high risk of respiratory decompensation; Arthur is honoring her mother's wishes to continue aggressive care including intubation but IF intubation is not going to help her mother or her mother becomes dependent on machines then no intubation. Initial consult note routed to primary continuity provider and/or primary health care team members  Communicated plan of care with: Palliative IDT, [address] Team     GOALS OF CARE / TREATMENT PREFERENCES:     GOALS OF CARE:  Patient/Health Care Proxy Stated Goals: Prolong life    TREATMENT PREFERENCES:   Code Status: Full Code      Advance Care Planning:  [x] [facility] Interdisciplinary Team has updated the ACP Navigator with [address] and Patient Capacity      Primary Decision Maker (Active): Tenisha Khan - Child - [phone]    Secondary Decision Maker: Ruddy Sanchez  Sister - [phone]  Advance Care Planning 1/25/2023   Patient's Healthcare Decision Maker is: Named in scanned ACP document   Confirm Advance Directive Yes, on file   Does the patient have other document types MOST/MOLST/POST/POLST       Medical Interventions: Full interventions       Other:    As far as possible, the palliative care team has discussed with patient / health care proxy about goals of care / treatment preferences for patient.      HISTORY:     History obtained from: chart     The patient is:   [] Verbal and participatory  [x] Non-participatory due to:   pt's condition     Clinical Pain Assessment (nonverbal scale for severity on nonverbal patients):   Clinical Pain Assessment  Severity: 0     Activity (Movement): Lying quietly, normal position    Duration: for how long has pt been experiencing pain (e.g., 2 days, 1 month, years)  Frequency: how often pain is an issue (e.g., several times per day, once every few days, constant)     FUNCTIONAL ASSESSMENT:     Palliative Performance Scale (PPS):  PPS: 20       PSYCHOSOCIAL/SPIRITUAL SCREENING:     Palliative IDT has assessed this patient for cultural preferences / practices and a referral made as appropriate to needs (Cultural Services, Patient Advocacy, Ethics, etc.)    Any spiritual / Rastafari concerns:  [] Yes /  [x] No   If \"Yes\" to discuss with pastoral care during IDT     Does caregiver feel burdened by caring for their loved one:   [] Yes /  [x] No /  [] No Caregiver Present    If \"Yes\" to discuss with social work during IDT    Anticipatory grief assessment:   [x] Normal  / [] Maladaptive     If \"Maladaptive\" to discuss with social work during IDT    ESAS Anxiety: Anxiety: 0    ESAS Depression:   unable to assess due to pt factors       REVIEW OF SYSTEMS:     Positive and pertinent negative findings in ROS are noted above in HPI. The following systems were [x] reviewed / [] unable to be reviewed as noted in HPI  Other findings are noted below. Systems: constitutional, ears/nose/mouth/throat, respiratory, gastrointestinal, genitourinary, musculoskeletal, integumentary, neurologic, psychiatric, endocrine. Positive findings noted below. Modified ESAS Completed by: provider           Pain: 0   Anxiety: 0       Dyspnea: 0                    PHYSICAL EXAM:     From RN flowsheet:  Wt Readings from Last 3 Encounters:   01/21/23 282 lb 3 oz (128 kg)   06/15/22 301 lb 9.6 oz (136.8 kg)   05/16/22 298 lb 11.6 oz (135.5 kg)     Blood pressure (!) 86/46, pulse (!) 56, temperature 97.8 °F (36.6 °C), resp. rate 17, height 5' 3\" (1.6 m), weight 282 lb 3 oz (128 kg), SpO2 98 %, not currently breastfeeding.     Pain Scale 1: Numeric (0 - 10)  Pain Intensity 1: 2     Pain Location 1: Back  Pain Orientation 1: Lower  Pain Description 1: Aching  Pain Intervention(s) 1: Medication (see MAR)  Last bowel movement, if known:     Constitutional: on bipap, fatigued, sedated  Eyes: pupils equal, anicteric  ENMT: no nasal discharge, dry mucous membranes, bipap mask on  Cardiovascular: regular rhythm, distal pulses intact  Respiratory: breathing not labored, symmetric  Gastrointestinal: soft non-tender, +bowel sounds  Musculoskeletal: no deformity, no tenderness to palpation  Skin: warm, dry, dressing around right forefoot  Neurologic: following simple commands, moving all extremities  Psychiatric: unable to assess due to pt factors       HISTORY:     Active Problems:    Symptomatic anemia (1/22/2023)    Past Medical History:   Diagnosis Date    Acute on chronic respiratory failure with hypoxia and hypercapnia ([facility]) 11/20/2020    Atrial fibrillation ([address] 11/4/2020    CHF (congestive heart failure) ([facility])     Diabetes ([facility])     Hypertension     Ill-defined condition     high cholesterol    Ill-defined condition     tachycardia    JOSLYN (obstructive sleep apnea) 11/20/2020    Other and unspecified symptoms and signs involving general sensations and perceptions     ruptured disc    S/P cardiac cath 11/4/2020    11/3/2020 nonobstructive disease      Past Surgical History:   Procedure Laterality Date    IN COMPRE ELECTROPHYSIOL XM W/LEFT ATRIAL PACNG/REC N/A 11/23/2020    Lt Atrial Pace & Record During Ep Study performed by Kike Xiao MD at [facility] CARDIAC CATH LAB    IN COMPRE EP EVAL ABLTJ 3D MAPG TX SVT N/A 11/23/2020    ABLATION A-FLUTTER performed by Kike Xiao MD at [facility] CARDIAC CATH LAB    IN ICAR CATHETER ABLATION ARRHYTHMIA ADD ON N/A 11/23/2020    Ablation Svt/Vt Add On performed by Kike Xaio MD at [facility] CARDIAC CATH LAB    IN INTRACARDIAC ELECTROPHYSIOLOGIC 3D MAPPING N/A 11/23/2020    Ep 3d Mapping performed by Kike Xiao MD at [facility] CARDIAC CATH LAB    IN STIM/PACING HEART POST IV DRUG INFU N/A 11/23/2020    Drug Stimulation performed by Levar Anderson MD at [facility] CARDIAC CATH LAB      No family history on file. History reviewed, no pertinent family history. Social History     Tobacco Use    Smoking status: Former    Smokeless tobacco: Never   Substance Use Topics    Alcohol use: No     Allergies   Allergen Reactions    Cefepime Rash     Tolerated zosyn 6/2022    Cefazolin Rash    Other Plant, Animal, Environmental Rash     Ivory soap caused rash on hands.       Current Facility-Administered Medications   Medication Dose Route Frequency    nafcillin (NALLPEN) 2 g in 0.9% sodium chloride (MBP/ADV) 100 mL MBP  2 g IntraVENous Q4H    alcohol 62% (NOZIN) nasal  1 Ampule  1 Ampule Topical Q12H    insulin lispro (HUMALOG) injection   SubCUTAneous Q6H    glucose chewable tablet 16 g  4 Tablet Oral PRN    glucagon (GLUCAGEN) injection 1 mg  1 mg IntraMUSCular PRN    dextrose 10% infusion 0-250 mL  0-250 mL IntraVENous PRN    furosemide (LASIX) injection 20 mg  20 mg IntraVENous BID    metoprolol tartrate (LOPRESSOR) tablet 12.5 mg  12.5 mg Oral BID    folic acid (FOLVITE) tablet 1 mg  1 mg Oral DAILY    iron sucrose (VENOFER) injection 100 mg  100 mg IntraVENous DAILY    albuterol-ipratropium (DUO-NEB) 2.5 MG-0.5 MG/3 ML  3 mL Nebulization Q4H PRN    atorvastatin (LIPITOR) tablet 10 mg  10 mg Oral QHS    pantoprazole (PROTONIX) tablet 40 mg  40 mg Oral ACB&D    sodium chloride (NS) flush 5-40 mL  5-40 mL IntraVENous Q8H    sodium chloride (NS) flush 5-40 mL  5-40 mL IntraVENous PRN    acetaminophen (TYLENOL) tablet 650 mg  650 mg Oral Q6H PRN    Or    acetaminophen (TYLENOL) suppository 650 mg  650 mg Rectal Q6H PRN    polyethylene glycol (MIRALAX) packet 17 g  17 g Oral DAILY PRN    ondansetron (ZOFRAN ODT) tablet 4 mg  4 mg Oral Q8H PRN    Or    ondansetron (ZOFRAN) injection 4 mg  4 mg IntraVENous Q6H PRN    dextrose 10% infusion 0-250 mL  0-250 mL IntraVENous PRN    balsam peru-castor oiL (VENELEX) ointment   Topical BID          LAB AND IMAGING FINDINGS:     Lab Results   Component Value Date/Time    WBC 16.3 (H) 01/25/2023 05:56 AM    HGB 8.0 (L) 01/25/2023 05:56 AM    PLATELET 031 28/09/9888 05:56 AM     Lab Results   Component Value Date/Time    Sodium 139 01/25/2023 05:56 AM    Potassium 4.1 01/25/2023 05:56 AM    Chloride 97 01/25/2023 05:56 AM    CO2 41 (HH) 01/25/2023 05:56 AM    BUN 24 (H) 01/25/2023 05:56 AM    Creatinine 0.85 01/25/2023 05:56 AM    Calcium 7.5 (L) 01/25/2023 05:56 AM    Magnesium 2.3 06/06/2022 03:53 AM    Phosphorus 2.9 06/06/2022 03:53 AM      Lab Results   Component Value Date/Time    Alk. phosphatase 97 01/21/2023 07:53 PM    Protein, total 7.2 01/21/2023 07:53 PM    Albumin 2.0 (L) 01/21/2023 07:53 PM    Globulin 5.2 (H) 01/21/2023 07:53 PM     Lab Results   Component Value Date/Time    INR 1.4 (H) 11/24/2020 02:59 AM    Prothrombin time 14.6 (H) 11/24/2020 02:59 AM    aPTT 29.6 06/08/2022 06:15 AM      Lab Results   Component Value Date/Time    Iron 31 (L) 01/23/2023 02:09 PM    TIBC 328 01/23/2023 02:09 PM    Iron % saturation 9 (L) 01/23/2023 02:09 PM    Ferritin 32 01/23/2023 02:09 PM      Lab Results   Component Value Date/Time    pH 7.27 (L) 01/25/2023 12:07 PM    PCO2 104 (H) 01/25/2023 12:07 PM    PO2 68 (L) 01/25/2023 12:07 PM     No components found for: [place]   Lab Results   Component Value Date/Time    CK 8 (L) 06/17/2022 05:28 AM    CK - MB <1.0 11/01/2020 04:33 AM                Total time: 70  Counseling / coordination time, spent as noted above: 35  > 50% counseling / coordination?: y    Prolonged service was provided for  []30 min   []75 min in face to face time in the presence of the patient, spent as noted above. Time Start:   Time End:   Note: this can only be billed with 04714 (initial) or 27412 (follow up). If multiple start / stop times, list each separately.

## 2023-01-25 NOTE — CONSULTS
Critical Care Progress Note  Nicholas Falcon MD          Date of Service:  2023  NAME:  Kajal Geronimo  :  1960  MRN:  [id number]      Subjective/Hospital course: Concepcion Headley is a 60 y.o.  female with pertinent past medical history of chronic hypoxemic respiratory failure on 4 L/min home oxygen therapy secondary to COPD, diabetes mellitus, essential hypertension, hyperlipidemia, JOSLYN compliant with CPAP, history duodenal ulcers, chronic anemia of indeterminate etiology who was admitted  with hemoglobin of 5.3. At the time, CXR demonstrates diffuse hazy interstitial opacities suggestive of edema with bandlike right upper lobe atelectasis. Patient was transfused with PRBC. GI consulted. GI recommends hematology evaluation. Deferred EGD at this time as patient doesn't have signs of overt bleeding and fecal occult blood test was negative. Per notes patient began complaining of increased shortness of breath on  and had desaturation with exertion (baseline 4LNC). She was given Lasix and CPAP. , Diamox was added for concern for \"CO2 is high\" . She was increased to [facility]. Overnight, she was increased to 10L then 15L early morning . ABG revealed 7.25/105/24 on 15L. She was placed on bipap and transferred to CCU as stepdown overflow. Of note, she has a recent hospitalization for respiratory failure in  with pCO2 of 150. At discharge, she was sent to  SNF on [facility]. On admit, notes say home CPAP. Will clarify with family. Subjective:  Patient was desaturating, she was on CPAP now changed to 15 L.        Problem list:         Assessment/Plan:     PULMONARY:  Acute on chronic hypercapnic hypoxic respiratory failure  Appears she was getting CPAP on the floor not home Trilogy   Needs ventilatory support with Bipap or trilogy  Chronic metabolic alkalosis secondary to chronic respiratory acidosis  Base bicarb calc should be approx 40, with CO2 likely 70-80 per abg and metabolic panel       Plan  - send flu / covid (worsening hypoxia, shortness of breath)  - avoid diamox   - Bipap adjusted to AVAPs with 7cc/kg  - repeat gas as needed  - close monitoring of mental status   - Continue nebulized bronchodilators  - Supplemental O2 to maintain SpO2 90-92   Avoid hyperoxia -> diminishes drive to breath        CARDIOVASCULAR/HEMODYNAMIC:  Mild hypotension with bipap   Volume overload       Current vasopressors: low dose NE if needed     Plan  - check lactate  - ICU hemodynamic/cardiac monitoring  - MAP goal > 65 mmHg  Continue lasix as BP allows         RENAL:  No acute issues     Plan  - Monitor I/Os and Uo  - Monitor renal function panel intermittently  - Correct electrolytes as needed  - Avoid nephrotoxic meds    GI/NUTRITION:  Obesity   Risk for malnutrition     Plan  - npo due to bipap   Will allow sips and PO meds if mental status improves       INFECTIOUS DISEASE  Bacteremia  Blood culture 1/1 + MSSA 1/21    Plan  - start nafcillin  - repeat blood cultures  - send procal / lactate       HEME  Chronic anemia  Stable hgb   Hematology / GI consulted on floor       Plan  - appreciate heme recs   - Daily CBC  - Transfuse as needed to maintain Hgb > 7.0 gm/dL or for hemodynamically significant bleeding    NEURO  CO2 narcosis     Plan  - continue bipap with close observation  - frequent mental status checks   - ABCDEF mobility bundle  - PT/OT involvement when appropriate  RASS goal:  Current sedatives:  Current analgesics      ENDO  No acute issues    Plan  - accuchecks while NPO        Code status: Full. Palliative care consulted on the floor  Will follow along. Care Plan discussed with: Patient/Family and Nurse    I personally spent 120 minutes of critical care time. This is time spent at this critically ill patient's bedside actively involved in patient care as well as the coordination of care and discussions with the patient's family.   This does not include any procedural time which has been billed separately. Review of Systems:   ROS   Unable to obtain due to mental status bipap     Vital Signs:   Patient Vitals for the past 4 hrs:   BP Temp Pulse Resp SpO2   01/25/23 0829 -- -- -- -- 100 %   01/25/23 0800 110/60 97.8 °F (36.6 °C) 65 22 99 %      No intake or output data in the 24 hours ending 01/25/23 1138     Physical Examination:    Physical Exam  Lethargic,  Responds to tactile stimulis, follows simple commands  Diminished bilateral BS   RRR no murmur    Labs and Imaging:   Reviewed.       Medications:     Current Facility-Administered Medications   Medication Dose Route Frequency    nafcillin (NALLPEN) 2 g in 0.9% sodium chloride (MBP/ADV) 100 mL MBP  2 g IntraVENous Q4H    alcohol 62% (NOZIN) nasal  1 Ampule  1 Ampule Topical Q12H    insulin lispro (HUMALOG) injection   SubCUTAneous Q6H    glucose chewable tablet 16 g  4 Tablet Oral PRN    glucagon (GLUCAGEN) injection 1 mg  1 mg IntraMUSCular PRN    dextrose 10% infusion 0-250 mL  0-250 mL IntraVENous PRN    furosemide (LASIX) injection 20 mg  20 mg IntraVENous BID    metoprolol tartrate (LOPRESSOR) tablet 12.5 mg  12.5 mg Oral BID    folic acid (FOLVITE) tablet 1 mg  1 mg Oral DAILY    iron sucrose (VENOFER) injection 100 mg  100 mg IntraVENous DAILY    albuterol-ipratropium (DUO-NEB) 2.5 MG-0.5 MG/3 ML  3 mL Nebulization Q4H PRN    atorvastatin (LIPITOR) tablet 10 mg  10 mg Oral QHS    pantoprazole (PROTONIX) tablet 40 mg  40 mg Oral ACB&D    sodium chloride (NS) flush 5-40 mL  5-40 mL IntraVENous Q8H    sodium chloride (NS) flush 5-40 mL  5-40 mL IntraVENous PRN    acetaminophen (TYLENOL) tablet 650 mg  650 mg Oral Q6H PRN    Or    acetaminophen (TYLENOL) suppository 650 mg  650 mg Rectal Q6H PRN    polyethylene glycol (MIRALAX) packet 17 g  17 g Oral DAILY PRN    ondansetron (ZOFRAN ODT) tablet 4 mg  4 mg Oral Q8H PRN    Or    ondansetron (ZOFRAN) injection 4 mg  4 mg IntraVENous Q6H PRN dextrose 10% infusion 0-250 mL  0-250 mL IntraVENous PRN    balsam peru-castor oiL (VENELEX) ointment   Topical BID     ______________________________________________________________________  EXPECTED LENGTH OF STAY: 2d 16h  ACTUAL LENGTH OF STAY:          3                 Jackelin Juarez MD   Pulmonary/[facility]  [phone]

## 2023-01-25 NOTE — PROGRESS NOTES
CO2 of 41 reported by lab. MD informed, patient seen by MD, order for patient tranfers to higher level of care for BiPAP treatment. Report given to [address] [place] [address] RN. AbG done showed co2 of 140. Patient transferred to care unit via stretcher, nursing assessment documented in flow sheet.       Sonja SALAZAR

## 2023-01-26 ENCOUNTER — APPOINTMENT (OUTPATIENT)
Dept: GENERAL RADIOLOGY | Age: 63
End: 2023-01-26
Attending: INTERNAL MEDICINE
Payer: MEDICARE

## 2023-01-26 LAB
ANION GAP SERPL CALC-SCNC: 7 MMOL/L (ref 5–15)
ATRIAL RATE: 55 BPM
BASOPHILS # BLD: 0 K/UL (ref 0–0.1)
BASOPHILS NFR BLD: 0 % (ref 0–1)
BUN SERPL-MCNC: 29 MG/DL (ref 6–20)
BUN/CREAT SERPL: 29 (ref 12–20)
CALCIUM SERPL-MCNC: 8.4 MG/DL (ref 8.5–10.1)
CALCULATED P AXIS, ECG09: 99 DEGREES
CALCULATED R AXIS, ECG10: 17 DEGREES
CALCULATED T AXIS, ECG11: 118 DEGREES
CHLORIDE SERPL-SCNC: 90 MMOL/L (ref 97–108)
CO2 SERPL-SCNC: 42 MMOL/L (ref 21–32)
CREAT SERPL-MCNC: 0.99 MG/DL (ref 0.55–1.02)
DIAGNOSIS, 93000: NORMAL
DIFFERENTIAL METHOD BLD: ABNORMAL
EOSINOPHIL # BLD: 0 K/UL (ref 0–0.4)
EOSINOPHIL NFR BLD: 0 % (ref 0–7)
ERYTHROCYTE [DISTWIDTH] IN BLOOD BY AUTOMATED COUNT: 16.2 % (ref 11.5–14.5)
GLIADIN PEPTIDE IGA SER-ACNC: 4 UNITS (ref 0–19)
GLIADIN PEPTIDE IGG SER-ACNC: 2 UNITS (ref 0–19)
GLUCOSE BLD STRIP.AUTO-MCNC: 100 MG/DL (ref 65–117)
GLUCOSE BLD STRIP.AUTO-MCNC: 112 MG/DL (ref 65–117)
GLUCOSE BLD STRIP.AUTO-MCNC: 117 MG/DL (ref 65–117)
GLUCOSE SERPL-MCNC: 121 MG/DL (ref 65–100)
HCT VFR BLD AUTO: 28.8 % (ref 35–47)
HGB BLD-MCNC: 8.4 G/DL (ref 11.5–16)
IGA SERPL-MCNC: 868 MG/DL (ref 87–352)
IMM GRANULOCYTES # BLD AUTO: 0.2 K/UL (ref 0–0.04)
IMM GRANULOCYTES NFR BLD AUTO: 1 % (ref 0–0.5)
LYMPHOCYTES # BLD: 1.5 K/UL (ref 0.8–3.5)
LYMPHOCYTES NFR BLD: 9 % (ref 12–49)
MCH RBC QN AUTO: 29.1 PG (ref 26–34)
MCHC RBC AUTO-ENTMCNC: 29.2 G/DL (ref 30–36.5)
MCV RBC AUTO: 99.7 FL (ref 80–99)
MONOCYTES # BLD: 0.6 K/UL (ref 0–1)
MONOCYTES NFR BLD: 4 % (ref 5–13)
NEUTS SEG # BLD: 14.3 K/UL (ref 1.8–8)
NEUTS SEG NFR BLD: 86 % (ref 32–75)
NRBC # BLD: 0.12 K/UL (ref 0–0.01)
NRBC BLD-RTO: 0.7 PER 100 WBC
P-R INTERVAL, ECG05: 188 MS
PLATELET # BLD AUTO: 286 K/UL (ref 150–400)
PMV BLD AUTO: 10.9 FL (ref 8.9–12.9)
POTASSIUM SERPL-SCNC: 3.6 MMOL/L (ref 3.5–5.1)
Q-T INTERVAL, ECG07: 482 MS
QRS DURATION, ECG06: 74 MS
QTC CALCULATION (BEZET), ECG08: 461 MS
RBC # BLD AUTO: 2.89 M/UL (ref 3.8–5.2)
SERVICE CMNT-IMP: NORMAL
SODIUM SERPL-SCNC: 139 MMOL/L (ref 136–145)
TTG IGA SER-ACNC: <2 U/ML (ref 0–3)
TTG IGG SER-ACNC: <2 U/ML (ref 0–5)
VENTRICULAR RATE, ECG03: 55 BPM
WBC # BLD AUTO: 16.7 K/UL (ref 3.6–11)

## 2023-01-26 PROCEDURE — 74011000258 HC RX REV CODE- 258: Performed by: INTERNAL MEDICINE

## 2023-01-26 PROCEDURE — 85025 COMPLETE CBC W/AUTO DIFF WBC: CPT

## 2023-01-26 PROCEDURE — 74011250636 HC RX REV CODE- 250/636: Performed by: INTERNAL MEDICINE

## 2023-01-26 PROCEDURE — 77010033678 HC OXYGEN DAILY

## 2023-01-26 PROCEDURE — 73502 X-RAY EXAM HIP UNI 2-3 VIEWS: CPT

## 2023-01-26 PROCEDURE — 71045 X-RAY EXAM CHEST 1 VIEW: CPT

## 2023-01-26 PROCEDURE — 74011000250 HC RX REV CODE- 250: Performed by: STUDENT IN AN ORGANIZED HEALTH CARE EDUCATION/TRAINING PROGRAM

## 2023-01-26 PROCEDURE — 93005 ELECTROCARDIOGRAM TRACING: CPT

## 2023-01-26 PROCEDURE — 74011250637 HC RX REV CODE- 250/637: Performed by: INTERNAL MEDICINE

## 2023-01-26 PROCEDURE — 87077 CULTURE AEROBIC IDENTIFY: CPT

## 2023-01-26 PROCEDURE — 87040 BLOOD CULTURE FOR BACTERIA: CPT

## 2023-01-26 PROCEDURE — 94660 CPAP INITIATION&MGMT: CPT

## 2023-01-26 PROCEDURE — 74011250637 HC RX REV CODE- 250/637: Performed by: STUDENT IN AN ORGANIZED HEALTH CARE EDUCATION/TRAINING PROGRAM

## 2023-01-26 PROCEDURE — 65610000006 HC RM INTENSIVE CARE

## 2023-01-26 PROCEDURE — 36415 COLL VENOUS BLD VENIPUNCTURE: CPT

## 2023-01-26 PROCEDURE — 74011000250 HC RX REV CODE- 250: Performed by: INTERNAL MEDICINE

## 2023-01-26 PROCEDURE — 82962 GLUCOSE BLOOD TEST: CPT

## 2023-01-26 PROCEDURE — 80048 BASIC METABOLIC PNL TOTAL CA: CPT

## 2023-01-26 PROCEDURE — 87186 SC STD MICRODIL/AGAR DIL: CPT

## 2023-01-26 RX ORDER — ENOXAPARIN SODIUM 100 MG/ML
30 INJECTION SUBCUTANEOUS EVERY 12 HOURS
Status: DISCONTINUED | OUTPATIENT
Start: 2023-01-26 | End: 2023-02-10 | Stop reason: HOSPADM

## 2023-01-26 RX ADMIN — CASTOR OIL AND BALSAM, PERU: 788; 87 OINTMENT TOPICAL at 08:51

## 2023-01-26 RX ADMIN — NAFCILLIN SODIUM 2 G: 2 INJECTION, POWDER, LYOPHILIZED, FOR SOLUTION INTRAMUSCULAR; INTRAVENOUS at 20:00

## 2023-01-26 RX ADMIN — NAFCILLIN SODIUM 2 G: 2 INJECTION, POWDER, LYOPHILIZED, FOR SOLUTION INTRAMUSCULAR; INTRAVENOUS at 08:48

## 2023-01-26 RX ADMIN — NAFCILLIN SODIUM 2 G: 2 INJECTION, POWDER, LYOPHILIZED, FOR SOLUTION INTRAMUSCULAR; INTRAVENOUS at 04:15

## 2023-01-26 RX ADMIN — ENOXAPARIN SODIUM 30 MG: 100 INJECTION SUBCUTANEOUS at 22:11

## 2023-01-26 RX ADMIN — Medication 1 AMPULE: at 21:00

## 2023-01-26 RX ADMIN — FOLIC ACID 1 MG: 1 TABLET ORAL at 09:38

## 2023-01-26 RX ADMIN — NAFCILLIN SODIUM 2 G: 2 INJECTION, POWDER, LYOPHILIZED, FOR SOLUTION INTRAMUSCULAR; INTRAVENOUS at 12:39

## 2023-01-26 RX ADMIN — PANTOPRAZOLE SODIUM 40 MG: 40 TABLET, DELAYED RELEASE ORAL at 06:41

## 2023-01-26 RX ADMIN — Medication 1 AMPULE: at 09:12

## 2023-01-26 RX ADMIN — PANTOPRAZOLE SODIUM 40 MG: 40 TABLET, DELAYED RELEASE ORAL at 16:45

## 2023-01-26 RX ADMIN — IRON SUCROSE 100 MG: 20 INJECTION, SOLUTION INTRAVENOUS at 09:39

## 2023-01-26 RX ADMIN — CASTOR OIL AND BALSAM, PERU: 788; 87 OINTMENT TOPICAL at 21:00

## 2023-01-26 RX ADMIN — ENOXAPARIN SODIUM 30 MG: 100 INJECTION SUBCUTANEOUS at 12:34

## 2023-01-26 RX ADMIN — ATORVASTATIN CALCIUM 10 MG: 10 TABLET, FILM COATED ORAL at 22:10

## 2023-01-26 RX ADMIN — SODIUM CHLORIDE, PRESERVATIVE FREE 10 ML: 5 INJECTION INTRAVENOUS at 16:52

## 2023-01-26 RX ADMIN — SODIUM CHLORIDE, PRESERVATIVE FREE 10 ML: 5 INJECTION INTRAVENOUS at 06:41

## 2023-01-26 RX ADMIN — NAFCILLIN SODIUM 2 G: 2 INJECTION, POWDER, LYOPHILIZED, FOR SOLUTION INTRAMUSCULAR; INTRAVENOUS at 16:54

## 2023-01-26 RX ADMIN — SODIUM CHLORIDE, PRESERVATIVE FREE 10 ML: 5 INJECTION INTRAVENOUS at 22:10

## 2023-01-26 NOTE — PROGRESS NOTES
Physical therapy:    Orders received and chart reviewed. Noted in chart pt has been bedridden and unable  to leave her house since her discharge from this hospital back in 8/7/2020. Pt has had multiple admissions and remains bed bound/dependent at baseline. Acute PT services are not indicated. Recommend repositioning and turning every 2 hours. Will complete current PT orders.  Thank you    Taj Ladd, PT, DPT

## 2023-01-26 NOTE — PROGRESS NOTES
Hematology Oncology Progress Note    Follow up for: anemia    Chart notes reviewed since last visit. Case discussed with following: .     Patient complains of the following: still in ICU on BIPAP, no complaints    Additional concerns noted by the staff:     Patient Vitals for the past 24 hrs:   BP Temp Pulse Resp SpO2 Height Weight   01/26/23 0913 (!) 105/48 -- 64 21 98 % -- --   01/26/23 0900 (!) 109/40 -- 67 23 95 % -- --   01/26/23 0843 -- -- -- -- 94 % -- --   01/26/23 0800 (!) 103/44 98.6 °F (37 °C) 64 29 95 % -- --   01/26/23 0745 96/64 -- 76 19 97 % -- --   01/26/23 0730 (!) 94/57 -- (!) 57 26 98 % -- --   01/26/23 0715 100/63 -- (!) 57 19 94 % -- --   01/26/23 0645 (!) 114/53 -- (!) 57 19 96 % -- --   01/26/23 0630 (!) 110/55 -- (!) 57 17 98 % -- --   01/26/23 0600 (!) 106/47 -- (!) 57 20 94 % -- --   01/26/23 0545 (!) 114/51 -- (!) 57 21 96 % -- --   01/26/23 0515 (!) 101/52 -- (!) 56 19 96 % -- --   01/26/23 0500 (!) 103/57 -- (!) 56 18 96 % -- --   01/26/23 0445 (!) 96/53 -- 62 13 93 % -- --   01/26/23 0430 115/71 -- (!) 56 21 100 % -- --   01/26/23 0417 -- -- -- -- 94 % -- --   01/26/23 0415 (!) 107/46 -- (!) 57 17 94 % -- --   01/26/23 0400 (!) 122/97 98.2 °F (36.8 °C) 61 17 91 % -- --   01/26/23 0315 94/65 -- (!) 57 18 95 % -- --   01/26/23 0245 104/65 -- (!) 53 19 99 % -- --   01/26/23 0230 (!) 98/46 -- (!) 54 17 94 % -- --   01/26/23 0215 108/77 -- (!) 55 18 95 % -- --   01/26/23 0200 (!) 93/45 -- (!) 53 18 96 % -- --   01/26/23 0145 (!) 100/48 -- (!) 55 19 96 % -- --   01/26/23 0130 (!) 103/48 -- (!) 55 23 96 % -- --   01/26/23 0101 -- -- -- -- 99 % -- --   01/26/23 0100 (!) 99/48 -- (!) 55 18 96 % -- --   01/26/23 0045 (!) 107/43 -- (!) 56 16 96 % -- --   01/26/23 0030 (!) 94/54 -- (!) 54 18 97 % -- --   01/26/23 0015 (!) 90/53 -- (!) 54 19 98 % -- --   01/26/23 0000 (!) 93/42 98.4 °F (36.9 °C) (!) 54 18 97 % -- --   01/25/23 2345 104/61 -- (!) 54 23 98 % -- --   01/25/23 2330 (!) 91/46 -- (!) 54 17 96 % -- --   01/25/23 2315 (!) 99/57 -- (!) 53 27 100 % -- --   01/25/23 2300 (!) 99/44 -- (!) 53 19 99 % -- --   01/25/23 2245 (!) 104/48 -- (!) 54 17 95 % -- --   01/25/23 2230 (!) 60/43 -- (!) 56 20 92 % -- --   01/25/23 2215 (!) 101/50 -- (!) 54 24 98 % -- --   01/25/23 2137 -- -- -- -- 100 % -- --   01/25/23 2130 (!) 101/41 -- (!) 56 17 99 % -- --   01/25/23 2115 (!) 86/47 -- (!) 54 17 100 % -- --   01/25/23 2045 91/72 -- (!) 54 20 99 % -- --   01/25/23 2015 97/61 -- (!) 55 19 100 % -- --   01/25/23 2000 (!) 105/54 98.2 °F (36.8 °C) (!) 56 18 100 % -- --   01/25/23 1800 (!) 101/47 -- (!) 56 22 97 % -- --   01/25/23 1730 (!) 91/43 -- (!) 56 20 98 % -- --   01/25/23 1715 (!) 88/42 -- (!) 56 20 97 % -- --   01/25/23 1645 (!) 89/45 -- (!) 54 16 92 % -- --   01/25/23 1634 119/60 -- -- -- -- 5' 3\" (1.6 m) 127.9 kg (282 lb)   01/25/23 1600 (!) 86/46 97.8 °F (36.6 °C) (!) 56 17 98 % -- --   01/25/23 1545 (!) 85/46 -- (!) 57 22 95 % -- --   01/25/23 1541 -- -- -- -- 98 % -- --   01/25/23 1515 (!) 63/54 -- (!) 56 18 100 % -- --   01/25/23 1500 111/66 -- (!) 56 19 100 % -- --   01/25/23 1400 (!) 98/53 -- (!) 55 18 94 % -- --   01/25/23 1330 (!) 94/47 97.6 °F (36.4 °C) (!) 55 18 96 % -- --   01/25/23 1245 (!) 95/45 -- (!) 54 17 94 % -- --   01/25/23 1230 (!) 85/40 -- (!) 54 16 92 % -- --   01/25/23 1154 -- -- -- -- 91 % -- --         Review of Systems:  No specific complaints    Physical Examination:  Gen some distress  Resp some distress on BIPAP      Labs:  Recent Results (from the past 24 hour(s))   EKG, 12 LEAD, SUBSEQUENT    Collection Time: 01/25/23 10:35 AM   Result Value Ref Range    Ventricular Rate 62 BPM    Atrial Rate 54 BPM    QRS Duration 62 ms    Q-T Interval 356 ms    QTC Calculation (Bezet) 361 ms    Calculated R Axis 19 degrees    Calculated T Axis 127 degrees    Diagnosis       Normal sinus rhythm  Low voltage QRS  Possible   Inferior infarct , age undetermined     Confirmed by Kinjal Pena M.D. ([id number]) on 1/25/2023 3:37:40 PM     GLUCOSE, POC    Collection Time: 01/25/23 12:02 PM   Result Value Ref Range    Glucose (POC) 121 (H) 65 - 117 mg/dL    Performed by [facility] RN    CULTURE, BLOOD, PERIPHERAL    Collection Time: 01/25/23 12:04 PM    Specimen: Blood   Result Value Ref Range    Special Requests: NO SPECIAL REQUESTS      Culture result:        ONE OF TWO BOTTLES HAS BEEN FLAGGED POSITIVE BY INSTRUMENT. BOTTLE HAS BEEN SENT TO [address] [place] [address] LABORATORY TO ASSESS FOR POSSIBLE GROWTH.     Culture result: REMAINING BOTTLE(S) HAS/HAVE NO GROWTH SO FAR     LACTIC ACID    Collection Time: 01/25/23 12:04 PM   Result Value Ref Range    Lactic acid 1.4 0.4 - 2.0 MMOL/L   PROCALCITONIN    Collection Time: 01/25/23 12:04 PM   Result Value Ref Range    Procalcitonin 0.91 ng/mL   BLOOD GAS, ARTERIAL    Collection Time: 01/25/23 12:07 PM   Result Value Ref Range    pH 7.27 (L) 7.35 - 7.45      PCO2 104 (H) 35 - 45 mmHg    PO2 68 (L) 80 - 100 mmHg    O2 SAT 89 (L) 92 - 97 %    BICARBONATE 47 (H) 22 - 26 mmol/L    BASE EXCESS 14.1 mmol/L    O2 METHOD BLEED IN      Tidal volume 350.0      SET RATE 16      IPAP/PIP 10      PEEP/CPAP 6.0      Sample source ARTERIAL      SITE RIGHT RADIAL      ELIA'S TEST YES     ECHO ADULT FOLLOW-UP OR LIMITED    Collection Time: 01/25/23  2:49 PM   Result Value Ref Range    IVSd 0.9 0.6 - 0.9 cm    LVIDd 4.0 3.9 - 5.3 cm    LVIDs 2.3 cm    LVOT Diameter 1.9 cm    LVPWd 0.8 0.6 - 0.9 cm    EF BP 69 55 - 100 %    LV Ejection Fraction A2C 36 %    LV Ejection Fraction A4C 84 %    LV EDV A2C 43 mL    LV EDV A4C 62 mL    LV EDV BP 54 (A) 56 - 104 mL    LV ESV A2C 27 mL    LV ESV A4C 10 mL    LV ESV BP 17 (A) 19 - 49 mL    LVOT Peak Gradient 5 mmHg    LVOT Mean Gradient 2 mmHg    LVOT SV 70.0 ml    LVOT Peak Velocity 1.1 m/s    LVOT VTI 24.7 cm    RVIDd 3.1 cm    AV Area by Peak Velocity 1.9 cm2    AV Area by Peak Velocity 1.9 cm2    AV Area by VTI 1.9 cm2    AV Peak Gradient 11 mmHg    AV Peak Gradient 11 mmHg    AV Mean Gradient 5 mmHg    AV Peak Velocity 1.6 m/s    AV Peak Velocity 1.7 m/s    AV Mean Velocity 1.0 m/s    AV VTI 36.8 cm    MV A Velocity 0.63 m/s    MV E Wave Deceleration Time 233.2 ms    MV E Velocity 0.63 m/s    MV Area by VTI 2.2 cm2    MV Peak Gradient 3 mmHg    MV Mean Gradient 1 mmHg    MV Max Velocity 0.9 m/s    MV Mean Velocity 0.5 m/s    MV VTI 32.2 cm    Pulmonary Artery EDP 8 mmHg    IA Max Velocity 1.4 m/s    PV Peak Gradient 5 mmHg    PV Max Velocity 1.1 m/s    Fractional Shortening 2D 43 28 - 44 %    LV ESV Index BP 8 mL/m2    LV EDV Index BP 24 mL/m2    LV ESV Index A4C 4 mL/m2    LV EDV Index A4C 28 mL/m2    LV ESV Index A2C 12 mL/m2    LV EDV Index A2C 19 mL/m2    LVIDd Index 1.79 cm/m2    LVIDs Index 1.03 cm/m2    LV RWT Ratio 0.40     LV Mass 2D 101.4 67 - 162 g    LV Mass 2D Index 45.3 43 - 95 g/m2    MV E/A 1.00     LVOT Stroke Volume Index 31.2 mL/m2    LVOT Area 2.8 cm2    LVOT:AV VTI Index 0.67     ZACH/BSA VTI 0.8 cm2/m2    MV:LVOT VTI Index 1.30    COVID-19 RAPID TEST    Collection Time: 01/25/23  3:54 PM   Result Value Ref Range    Specimen source Nasopharyngeal      COVID-19 rapid test Not detected NOTD     BLOOD GAS, VENOUS    Collection Time: 01/25/23  3:55 PM   Result Value Ref Range    VENOUS PH 7.25 (L) 7.32 - 7.42      VENOUS PCO2 122.0 (H) 41 - 51 mmHg    VENOUS PO2 17 (L) 25 - 40 mmHg    VENOUS BICARBONATE 53 (H) 23 - 28 mmol/L    VENOUS BASE EXCESS 18.7 mmol/L    VENOUS O2 SATURATION 17 (L) 65 - 88 %    O2 METHOD BIPAP      FIO2 60 %    Tidal volume 375.0      SET RATE 16      IPAP/PIP 10      PEEP/CPAP 6.0      Sample source VENOUS BLOOD      SITE OTHER     GLUCOSE, POC    Collection Time: 01/25/23  4:31 PM   Result Value Ref Range    Glucose (POC) 106 65 - 117 mg/dL    Performed by Kamla Chavira RN    INFLUENZA A+B VIRAL AGS    Collection Time: 01/25/23  9:23 PM   Result Value Ref Range    Influenza A Antigen Negative NEG      Influenza B Antigen Negative NEG     GLUCOSE, POC    Collection Time: 01/25/23 11:45 PM   Result Value Ref Range    Glucose (POC) 119 (H) 65 - 117 mg/dL    Performed by Nellie Newell RN    CBC WITH AUTOMATED DIFF    Collection Time: 01/26/23  4:50 AM   Result Value Ref Range    WBC 16.7 (H) 3.6 - 11.0 K/uL    RBC 2.89 (L) 3.80 - 5.20 M/uL    HGB 8.4 (L) 11.5 - 16.0 g/dL    HCT 28.8 (L) 35.0 - 47.0 %    MCV 99.7 (H) 80.0 - 99.0 FL    MCH 29.1 26.0 - 34.0 PG    MCHC 29.2 (L) 30.0 - 36.5 g/dL    RDW 16.2 (H) 11.5 - 14.5 %    PLATELET 127 566 - 158 K/uL    MPV 10.9 8.9 - 12.9 FL    NRBC 0.7 (H) 0  WBC    ABSOLUTE NRBC 0.12 (H) 0.00 - 0.01 K/uL    NEUTROPHILS 86 (H) 32 - 75 %    LYMPHOCYTES 9 (L) 12 - 49 %    MONOCYTES 4 (L) 5 - 13 %    EOSINOPHILS 0 0 - 7 %    BASOPHILS 0 0 - 1 %    IMMATURE GRANULOCYTES 1 (H) 0.0 - 0.5 %    ABS. NEUTROPHILS 14.3 (H) 1.8 - 8.0 K/UL    ABS. LYMPHOCYTES 1.5 0.8 - 3.5 K/UL    ABS. MONOCYTES 0.6 0.0 - 1.0 K/UL    ABS. EOSINOPHILS 0.0 0.0 - 0.4 K/UL    ABS. BASOPHILS 0.0 0.0 - 0.1 K/UL    ABS. IMM.  GRANS. 0.2 (H) 0.00 - 0.04 K/UL    DF AUTOMATED     METABOLIC PANEL, BASIC    Collection Time: 01/26/23  4:50 AM   Result Value Ref Range    Sodium 139 136 - 145 mmol/L    Potassium 3.6 3.5 - 5.1 mmol/L    Chloride 90 (L) 97 - 108 mmol/L    CO2 42 (HH) 21 - 32 mmol/L    Anion gap 7 5 - 15 mmol/L    Glucose 121 (H) 65 - 100 mg/dL    BUN 29 (H) 6 - 20 MG/DL    Creatinine 0.99 0.55 - 1.02 MG/DL    BUN/Creatinine ratio 29 (H) 12 - 20      eGFR >60 >60 ml/min/1.73m2    Calcium 8.4 (L) 8.5 - 10.1 MG/DL   GLUCOSE, POC    Collection Time: 01/26/23  6:14 AM   Result Value Ref Range    Glucose (POC) 100 65 - 117 mg/dL    Performed by Nellie Newell RN        Assessment and Plan:   Anemia  -folate and iron deficiency, started on folic acid and IV iron  -transfuse for HBG < 7  -no sign of hemolysis  -GI following, needing to optimise resp status before any attempt at EGD/colonoscopy  -HBG stable    Resp distress  -now in ICU on BIPAP  -plan per ICU doc

## 2023-01-26 NOTE — PROGRESS NOTES
Received report from [facility] [place].  9612 Dr. Desi Tong in to see patient. [address] removed and patient placed on 4 LPM nasal cannula. 1000 Patient continues to tolerate nasal cannula O2.   1028 Interdisciplinary team rounds were held 1/26/2023 with the following team members:Diabetes Treatment Specialist, Nursing, Nutrition, Pharmacy, and Physician. Plan of care discussed. See clinical pathway and/or care plan for interventions and desired outcomes. Goals of the Day: XRay of hip, monitor oxygenation, DVT prophylaxis. 1230 Wound care called and discussed wound care to R toe which had toenail removed. Per wound care, dress with xeroform gauze and small amount of zack every other day. 1456 Patient to xray at this time for xray of left hip.  1555 Patient back to room from xray. 1 Dr. Jerald Hitchcock in to see patient. Patient states that she is sleepy. BIpap mask placed on patient at this time so that she can sleep. 1905 End of Shift Note    Bedside shift change report given to [address] [place] [address] (oncoming nurse) by Oriana Hines RN (offgoing nurse).   Report included the following information SBAR, Kardex, Intake/Output, MAR, and Recent Results    Shift worked:  7am to [facility]     Shift summary and any significant changes:    See above note     Concerns for physician to address: See above note     Zone phone for oncoming shift:  N/a       Activity:  Activity Level: Bed Rest  Number times ambulated in hallways past shift: 0  Number of times OOB to chair past shift: 0    Cardiac:   Cardiac Monitoring: Yes      Cardiac Rhythm: Sinus Jerod    Access:  Current line(s): endurance catheter    Genitourinary:   Urinary status: incontinent and external catheter    Respiratory:   O2 Device: BIPAP  Chronic home O2 use?: YES  Incentive spirometer at bedside: NO       GI:  Last Bowel Movement Date: 01/21/23  Current diet:  ADULT DIET Full Liquid; 4 carb choices (60 gm/meal)  Passing flatus: NO  Tolerating current diet: has not eaten, resting with bipap on       Pain Management:   Patient states pain is manageable on current regimen: YES    Skin:  Gaston Score: 11  Interventions: turn team and float heels    Patient Safety:  Fall Score:  Total Score: 1  Interventions: bed/chair alarm       Length of Stay:  Expected LOS: 2d 16h  Actual LOS: 8080 DIANNA Whittington RN

## 2023-01-26 NOTE — PROGRESS NOTES
Gastroenterology Daily Progress Note   COREEN Cano for Dr. Kellee Glass)   [address] [place] [address] Dr Shannon Date: 1/21/2023     Follow up of anemia    Subjective:       Patient was transferred to the ICU for acute respiratory failure. She was desating on 15L oxygen. ABG revealed severe hypercapnia. Changed to BIPAP. She has not had a BM or any vomiting and hgb is stable at 8.4  She is slow to answer questions but oriented.   Currently on 4L nasal cannula oxygen  Folate low at 3.8  Iron low at 31 and iron sat low at 9%  No melena, hematochezia or hematemesis/coffee ground emesis  Stool heme negative  Was on Xarelto PTA  No NSAID use, was not on PPI PTA  Presented with macrocytic anemia, mcv 100 on admission  Has never had a colonoscopy, No FH of CRC  has sleep apnea, BMI 49.99, CXR shows pulmonary edema  Is bedbound  Recalls about 1-2 years ago was having a procedure with anesthesia and it had to be stopped because her \"heart stopped\"  Wears CPAP at night, pulm following    Current Facility-Administered Medications   Medication Dose Route Frequency    nafcillin (NALLPEN) 2 g in 0.9% sodium chloride (MBP/ADV) 100 mL MBP  2 g IntraVENous Q4H    alcohol 62% (NOZIN) nasal  1 Ampule  1 Ampule Topical Q12H    insulin lispro (HUMALOG) injection   SubCUTAneous Q6H    glucose chewable tablet 16 g  4 Tablet Oral PRN    glucagon (GLUCAGEN) injection 1 mg  1 mg IntraMUSCular PRN    dextrose 10% infusion 0-250 mL  0-250 mL IntraVENous PRN    [Held by provider] furosemide (LASIX) injection 20 mg  20 mg IntraVENous BID    [Held by provider] metoprolol tartrate (LOPRESSOR) tablet 12.5 mg  12.5 mg Oral BID    NOREPINephrine (LEVOPHED) 8 mg in 5% dextrose 250mL (32 mcg/mL) infusion  0.5-20 mcg/min IntraVENous TITRATE    folic acid (FOLVITE) tablet 1 mg  1 mg Oral DAILY    albuterol-ipratropium (DUO-NEB) 2.5 MG-0.5 MG/3 ML  3 mL Nebulization Q4H PRN    atorvastatin (LIPITOR) tablet 10 mg  10 mg Oral QHS    pantoprazole (PROTONIX) tablet 40 mg  40 mg Oral ACB&D    sodium chloride (NS) flush 5-40 mL  5-40 mL IntraVENous Q8H    sodium chloride (NS) flush 5-40 mL  5-40 mL IntraVENous PRN    acetaminophen (TYLENOL) tablet 650 mg  650 mg Oral Q6H PRN    Or    acetaminophen (TYLENOL) suppository 650 mg  650 mg Rectal Q6H PRN    polyethylene glycol (MIRALAX) packet 17 g  17 g Oral DAILY PRN    ondansetron (ZOFRAN ODT) tablet 4 mg  4 mg Oral Q8H PRN    Or    ondansetron (ZOFRAN) injection 4 mg  4 mg IntraVENous Q6H PRN    dextrose 10% infusion 0-250 mL  0-250 mL IntraVENous PRN    balsam peru-castor oiL (VENELEX) ointment   Topical BID        Objective:     Visit Vitals  BP (!) 91/46   Pulse 60   Temp 98.6 °F (37 °C)   Resp 23   Ht 5' 3\" (1.6 m)   Wt 127.9 kg (282 lb)   SpO2 93%   Breastfeeding No   BMI 49.95 kg/m²   Blood pressure (!) 91/46, pulse 60, temperature 98.6 °F (37 °C), resp. rate 23, height 5' 3\" (1.6 m), weight 127.9 kg (282 lb), SpO2 93 %, not currently breastfeeding. 01/26 0701 - 01/26 1900  In: 15 [I.V.:15]  Out: 125 [Urine:125]    01/24 1901 - 01/26 0700  In: 393.8 [I.V.:393.8]  Out: 1250 [Urine:1250]      Intake/Output Summary (Last 24 hours) at 1/26/2023 1028  Last data filed at 1/26/2023 0800  Gross per 24 hour   Intake 408.75 ml   Output 1375 ml   Net -966.25 ml           Physical Exam:       General: morbidly obese white female with thick neck laying in bed in nad  Chest:  CTA, No rhonchi, rales or rubs. No use of accessory muscles.     Heart: S1, S2, RRR  GI: Morbidly obese, Soft, NT, ND + bowel sounds  Extremities: 2+ pitting edema bilaterally  CNS: oriented x 3 but slow to answer questions      Labs:       Recent Results (from the past 24 hour(s))   EKG, 12 LEAD, SUBSEQUENT    Collection Time: 01/25/23 10:35 AM   Result Value Ref Range    Ventricular Rate 62 BPM    Atrial Rate 54 BPM    QRS Duration 62 ms    Q-T Interval 356 ms    QTC Calculation (Bezet) 361 ms    Calculated R Axis 19 degrees    Calculated T Axis 127 degrees    Diagnosis       Normal sinus rhythm  Low voltage QRS  Possible   Inferior infarct , age undetermined     Confirmed by Brie Myers M.D. ([id number]) on 1/25/2023 3:37:40 PM     GLUCOSE, POC    Collection Time: 01/25/23 12:02 PM   Result Value Ref Range    Glucose (POC) 121 (H) 65 - 117 mg/dL    Performed by Scott Pichardo RN    CULTURE, BLOOD, PERIPHERAL    Collection Time: 01/25/23 12:04 PM    Specimen: Blood   Result Value Ref Range    Special Requests: NO SPECIAL REQUESTS      Culture result:        ONE OF TWO BOTTLES HAS BEEN FLAGGED POSITIVE BY INSTRUMENT. BOTTLE HAS BEEN SENT TO [facility]OR[facility] TO ASSESS FOR POSSIBLE GROWTH.     Culture result: REMAINING BOTTLE(S) HAS/HAVE NO GROWTH SO FAR     LACTIC ACID    Collection Time: 01/25/23 12:04 PM   Result Value Ref Range    Lactic acid 1.4 0.4 - 2.0 MMOL/L   PROCALCITONIN    Collection Time: 01/25/23 12:04 PM   Result Value Ref Range    Procalcitonin 0.91 ng/mL   BLOOD GAS, ARTERIAL    Collection Time: 01/25/23 12:07 PM   Result Value Ref Range    pH 7.27 (L) 7.35 - 7.45      PCO2 104 (H) 35 - 45 mmHg    PO2 68 (L) 80 - 100 mmHg    O2 SAT 89 (L) 92 - 97 %    BICARBONATE 47 (H) 22 - 26 mmol/L    BASE EXCESS 14.1 mmol/L    O2 METHOD BLEED IN      Tidal volume 350.0      SET RATE 16      IPAP/PIP 10      PEEP/CPAP 6.0      Sample source ARTERIAL      SITE RIGHT RADIAL      ELIA'S TEST YES     ECHO ADULT FOLLOW-UP OR LIMITED    Collection Time: 01/25/23  2:49 PM   Result Value Ref Range    IVSd 0.9 0.6 - 0.9 cm    LVIDd 4.0 3.9 - 5.3 cm    LVIDs 2.3 cm    LVOT Diameter 1.9 cm    LVPWd 0.8 0.6 - 0.9 cm    EF BP 69 55 - 100 %    LV Ejection Fraction A2C 36 %    LV Ejection Fraction A4C 84 %    LV EDV A2C 43 mL    LV EDV A4C 62 mL    LV EDV BP 54 (A) 56 - 104 mL    LV ESV A2C 27 mL    LV ESV A4C 10 mL    LV ESV BP 17 (A) 19 - 49 mL    LVOT Peak Gradient 5 mmHg    LVOT Mean Gradient 2 mmHg    LVOT SV 70.0 ml    LVOT Peak Velocity 1.1 m/s    LVOT VTI 24.7 cm    RVIDd 3.1 cm    AV Area by Peak Velocity 1.9 cm2    AV Area by Peak Velocity 1.9 cm2    AV Area by VTI 1.9 cm2    AV Peak Gradient 11 mmHg    AV Peak Gradient 11 mmHg    AV Mean Gradient 5 mmHg    AV Peak Velocity 1.6 m/s    AV Peak Velocity 1.7 m/s    AV Mean Velocity 1.0 m/s    AV VTI 36.8 cm    MV A Velocity 0.63 m/s    MV E Wave Deceleration Time 233.2 ms    MV E Velocity 0.63 m/s    MV Area by VTI 2.2 cm2    MV Peak Gradient 3 mmHg    MV Mean Gradient 1 mmHg    MV Max Velocity 0.9 m/s    MV Mean Velocity 0.5 m/s    MV VTI 32.2 cm    Pulmonary Artery EDP 8 mmHg    TN Max Velocity 1.4 m/s    PV Peak Gradient 5 mmHg    PV Max Velocity 1.1 m/s    Fractional Shortening 2D 43 28 - 44 %    LV ESV Index BP 8 mL/m2    LV EDV Index BP 24 mL/m2    LV ESV Index A4C 4 mL/m2    LV EDV Index A4C 28 mL/m2    LV ESV Index A2C 12 mL/m2    LV EDV Index A2C 19 mL/m2    LVIDd Index 1.79 cm/m2    LVIDs Index 1.03 cm/m2    LV RWT Ratio 0.40     LV Mass 2D 101.4 67 - 162 g    LV Mass 2D Index 45.3 43 - 95 g/m2    MV E/A 1.00     LVOT Stroke Volume Index 31.2 mL/m2    LVOT Area 2.8 cm2    LVOT:AV VTI Index 0.67     ZACH/BSA VTI 0.8 cm2/m2    MV:LVOT VTI Index 1.30    COVID-19 RAPID TEST    Collection Time: 01/25/23  3:54 PM   Result Value Ref Range    Specimen source Nasopharyngeal      COVID-19 rapid test Not detected NOTD     BLOOD GAS, VENOUS    Collection Time: 01/25/23  3:55 PM   Result Value Ref Range    VENOUS PH 7.25 (L) 7.32 - 7.42      VENOUS PCO2 122.0 (H) 41 - 51 mmHg    VENOUS PO2 17 (L) 25 - 40 mmHg    VENOUS BICARBONATE 53 (H) 23 - 28 mmol/L    VENOUS BASE EXCESS 18.7 mmol/L    VENOUS O2 SATURATION 17 (L) 65 - 88 %    O2 METHOD BIPAP      FIO2 60 %    Tidal volume 375.0      SET RATE 16      IPAP/PIP 10      PEEP/CPAP 6.0      Sample source VENOUS BLOOD      SITE OTHER     GLUCOSE, POC    Collection Time: 01/25/23  4:31 PM   Result Value Ref Range    Glucose (POC) 106 65 - 117 mg/dL Performed by Kelli Horta RN    INFLUENZA A+B VIRAL AGS    Collection Time: 01/25/23  9:23 PM   Result Value Ref Range    Influenza A Antigen Negative NEG      Influenza B Antigen Negative NEG     GLUCOSE, POC    Collection Time: 01/25/23 11:45 PM   Result Value Ref Range    Glucose (POC) 119 (H) 65 - 117 mg/dL    Performed by Jessika Barkley RN    CBC WITH AUTOMATED DIFF    Collection Time: 01/26/23  4:50 AM   Result Value Ref Range    WBC 16.7 (H) 3.6 - 11.0 K/uL    RBC 2.89 (L) 3.80 - 5.20 M/uL    HGB 8.4 (L) 11.5 - 16.0 g/dL    HCT 28.8 (L) 35.0 - 47.0 %    MCV 99.7 (H) 80.0 - 99.0 FL    MCH 29.1 26.0 - 34.0 PG    MCHC 29.2 (L) 30.0 - 36.5 g/dL    RDW 16.2 (H) 11.5 - 14.5 %    PLATELET 593 476 - 754 K/uL    MPV 10.9 8.9 - 12.9 FL    NRBC 0.7 (H) 0  WBC    ABSOLUTE NRBC 0.12 (H) 0.00 - 0.01 K/uL    NEUTROPHILS 86 (H) 32 - 75 %    LYMPHOCYTES 9 (L) 12 - 49 %    MONOCYTES 4 (L) 5 - 13 %    EOSINOPHILS 0 0 - 7 %    BASOPHILS 0 0 - 1 %    IMMATURE GRANULOCYTES 1 (H) 0.0 - 0.5 %    ABS. NEUTROPHILS 14.3 (H) 1.8 - 8.0 K/UL    ABS. LYMPHOCYTES 1.5 0.8 - 3.5 K/UL    ABS. MONOCYTES 0.6 0.0 - 1.0 K/UL    ABS. EOSINOPHILS 0.0 0.0 - 0.4 K/UL    ABS. BASOPHILS 0.0 0.0 - 0.1 K/UL    ABS. IMM.  GRANS. 0.2 (H) 0.00 - 0.04 K/UL    DF AUTOMATED     METABOLIC PANEL, BASIC    Collection Time: 01/26/23  4:50 AM   Result Value Ref Range    Sodium 139 136 - 145 mmol/L    Potassium 3.6 3.5 - 5.1 mmol/L    Chloride 90 (L) 97 - 108 mmol/L    CO2 42 (HH) 21 - 32 mmol/L    Anion gap 7 5 - 15 mmol/L    Glucose 121 (H) 65 - 100 mg/dL    BUN 29 (H) 6 - 20 MG/DL    Creatinine 0.99 0.55 - 1.02 MG/DL    BUN/Creatinine ratio 29 (H) 12 - 20      eGFR >60 >60 ml/min/1.73m2    Calcium 8.4 (L) 8.5 - 10.1 MG/DL   GLUCOSE, POC    Collection Time: 01/26/23  6:14 AM   Result Value Ref Range    Glucose (POC) 100 65 - 117 mg/dL    Performed by Jessika Barkley RN    LABRCNT(wbc:2,hgb:2,hct:2,plt:2,)  Recent Labs     01/26/23  9101 01/25/23  0556 01/24/23  0027    139 139   K 3.6 4.1 3.8   CL 90* 97 91*   CO2 42* 41* >45*   BUN 29* 24* 20   CREA 0.99 0.85 0.74   * 104* 105*   CA 8.4* 7.5* 8.3*     LABRCNT(sgot:3,gpt:3,ap:3,tbiL:3,TP:3,ALB:3,GLOB:3,ggt:3,aml:3,amyp:3,lpse:3,hlpse:3)No results for input(s): INR, PTP, APTT, INREXT, INREXT in the last 72 hours. No results for input(s): AP, TBIL, TP, ALB, GLOB, GGT, AML, LPSE in the last 72 hours. No lab exists for component: SGOT, GPT, AMYP, HLPSE  BRIEFLAB(B12,FOL,FOLAT,RBCF)  Lab Results   Component Value Date/Time    Folate 3.8 (L) 01/23/2023 02:09 PM     LABRCNT(CPK:3,CpKMB:3,ckndx:3,troiq:3)No components found for: GLPOCBRIEFLAB(CHOL,CHOLX,CHOLP,CHLST,CHOLV,HDL,HDLC,HDLP,LDL,DLDL,LDLC,DLDLP,TGL,TGLX,TRIGL,TRIGP,CHHD,CHHDX)  Recent Labs     01/25/23  1207 01/25/23  0808   PH 7.27* 7.25*   PCO2 104* 105*   PO2 68* 74*     LABRCNT(CPK:3,CpKMB:3,ckndx:3,troiq:3)COREEN Garnett  No results for input(s): CPK, CKNDX, TROIQ in the last 72 hours. 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 She is extremely high risk for anesthesia complications and we do not recommend an EGD/Colon unless she is acutely hemorrhaging. Patient voices good understanding.    We will sign off an be available to see again on request.            Jolanta Huerta, [id number]    1/26/2023  [address] [place] [address], [address] [place] [address] [address] [place] [address]: [phone]

## 2023-01-26 NOTE — PROGRESS NOTES
Occupational Therapy     Per chart review, including note OT notes from previous admits, the patient is dependent and bed bound at baseline. Patient is not appropriate for therapy services due to her dependent baseline status. Will complete OT orders.      Isaak Calderón, OTR/L

## 2023-01-27 LAB
ALBUMIN SERPL-MCNC: 2.1 G/DL (ref 3.5–5)
ALBUMIN/GLOB SERPL: 0.4 (ref 1.1–2.2)
ALP SERPL-CCNC: 83 U/L (ref 45–117)
ALT SERPL-CCNC: 26 U/L (ref 12–78)
ANION GAP SERPL CALC-SCNC: 5 MMOL/L (ref 5–15)
AST SERPL-CCNC: 54 U/L (ref 15–37)
ATRIAL RATE: 65 BPM
BASOPHILS # BLD: 0 K/UL (ref 0–0.1)
BASOPHILS NFR BLD: 0 % (ref 0–1)
BILIRUB DIRECT SERPL-MCNC: 2.8 MG/DL (ref 0–0.2)
BILIRUB SERPL-MCNC: 4.2 MG/DL (ref 0.2–1)
BUN SERPL-MCNC: 37 MG/DL (ref 6–20)
BUN/CREAT SERPL: 30 (ref 12–20)
CALCIUM SERPL-MCNC: 8.2 MG/DL (ref 8.5–10.1)
CALCULATED P AXIS, ECG09: 66 DEGREES
CALCULATED R AXIS, ECG10: 38 DEGREES
CALCULATED T AXIS, ECG11: 96 DEGREES
CHLORIDE SERPL-SCNC: 91 MMOL/L (ref 97–108)
CO2 SERPL-SCNC: 42 MMOL/L (ref 21–32)
CREAT SERPL-MCNC: 1.22 MG/DL (ref 0.55–1.02)
DIAGNOSIS, 93000: NORMAL
DIFFERENTIAL METHOD BLD: ABNORMAL
EOSINOPHIL # BLD: 0 K/UL (ref 0–0.4)
EOSINOPHIL NFR BLD: 0 % (ref 0–7)
ERYTHROCYTE [DISTWIDTH] IN BLOOD BY AUTOMATED COUNT: 16.9 % (ref 11.5–14.5)
GLOBULIN SER CALC-MCNC: 4.8 G/DL (ref 2–4)
GLUCOSE BLD STRIP.AUTO-MCNC: 101 MG/DL (ref 65–117)
GLUCOSE BLD STRIP.AUTO-MCNC: 120 MG/DL (ref 65–117)
GLUCOSE BLD STRIP.AUTO-MCNC: 124 MG/DL (ref 65–117)
GLUCOSE BLD STRIP.AUTO-MCNC: 146 MG/DL (ref 65–117)
GLUCOSE SERPL-MCNC: 123 MG/DL (ref 65–100)
HCT VFR BLD AUTO: 27.6 % (ref 35–47)
HGB BLD-MCNC: 8.1 G/DL (ref 11.5–16)
IMM GRANULOCYTES # BLD AUTO: 0.2 K/UL (ref 0–0.04)
IMM GRANULOCYTES NFR BLD AUTO: 1 % (ref 0–0.5)
LYMPHOCYTES # BLD: 1.7 K/UL (ref 0.8–3.5)
LYMPHOCYTES NFR BLD: 10 % (ref 12–49)
MCH RBC QN AUTO: 28.7 PG (ref 26–34)
MCHC RBC AUTO-ENTMCNC: 29.3 G/DL (ref 30–36.5)
MCV RBC AUTO: 97.9 FL (ref 80–99)
MONOCYTES # BLD: 0.7 K/UL (ref 0–1)
MONOCYTES NFR BLD: 4 % (ref 5–13)
NEUTS SEG # BLD: 13.8 K/UL (ref 1.8–8)
NEUTS SEG NFR BLD: 85 % (ref 32–75)
NRBC # BLD: 0.08 K/UL (ref 0–0.01)
NRBC BLD-RTO: 0.5 PER 100 WBC
P-R INTERVAL, ECG05: 192 MS
PHOSPHATE SERPL-MCNC: 2.4 MG/DL (ref 2.6–4.7)
PLATELET # BLD AUTO: 245 K/UL (ref 150–400)
PMV BLD AUTO: 10.9 FL (ref 8.9–12.9)
POTASSIUM SERPL-SCNC: 3.3 MMOL/L (ref 3.5–5.1)
PROCALCITONIN SERPL-MCNC: 0.44 NG/ML
PROT SERPL-MCNC: 6.9 G/DL (ref 6.4–8.2)
Q-T INTERVAL, ECG07: 452 MS
QRS DURATION, ECG06: 82 MS
QTC CALCULATION (BEZET), ECG08: 470 MS
RBC # BLD AUTO: 2.82 M/UL (ref 3.8–5.2)
SERVICE CMNT-IMP: ABNORMAL
SERVICE CMNT-IMP: NORMAL
SODIUM SERPL-SCNC: 138 MMOL/L (ref 136–145)
VENTRICULAR RATE, ECG03: 65 BPM
WBC # BLD AUTO: 16.3 K/UL (ref 3.6–11)

## 2023-01-27 PROCEDURE — 93005 ELECTROCARDIOGRAM TRACING: CPT

## 2023-01-27 PROCEDURE — 84100 ASSAY OF PHOSPHORUS: CPT

## 2023-01-27 PROCEDURE — 87040 BLOOD CULTURE FOR BACTERIA: CPT

## 2023-01-27 PROCEDURE — 74011250637 HC RX REV CODE- 250/637: Performed by: STUDENT IN AN ORGANIZED HEALTH CARE EDUCATION/TRAINING PROGRAM

## 2023-01-27 PROCEDURE — 36415 COLL VENOUS BLD VENIPUNCTURE: CPT

## 2023-01-27 PROCEDURE — 82272 OCCULT BLD FECES 1-3 TESTS: CPT

## 2023-01-27 PROCEDURE — 84145 PROCALCITONIN (PCT): CPT

## 2023-01-27 PROCEDURE — 80076 HEPATIC FUNCTION PANEL: CPT

## 2023-01-27 PROCEDURE — 80048 BASIC METABOLIC PNL TOTAL CA: CPT

## 2023-01-27 PROCEDURE — 74011000258 HC RX REV CODE- 258: Performed by: NURSE PRACTITIONER

## 2023-01-27 PROCEDURE — 85025 COMPLETE CBC W/AUTO DIFF WBC: CPT

## 2023-01-27 PROCEDURE — 74011000250 HC RX REV CODE- 250: Performed by: STUDENT IN AN ORGANIZED HEALTH CARE EDUCATION/TRAINING PROGRAM

## 2023-01-27 PROCEDURE — 99221 1ST HOSP IP/OBS SF/LOW 40: CPT | Performed by: ORTHOPAEDIC SURGERY

## 2023-01-27 PROCEDURE — 94660 CPAP INITIATION&MGMT: CPT

## 2023-01-27 PROCEDURE — 82962 GLUCOSE BLOOD TEST: CPT

## 2023-01-27 PROCEDURE — 74011250637 HC RX REV CODE- 250/637: Performed by: INTERNAL MEDICINE

## 2023-01-27 PROCEDURE — 74011000250 HC RX REV CODE- 250: Performed by: NURSE PRACTITIONER

## 2023-01-27 PROCEDURE — 74011250636 HC RX REV CODE- 250/636: Performed by: INTERNAL MEDICINE

## 2023-01-27 PROCEDURE — 74011000258 HC RX REV CODE- 258: Performed by: INTERNAL MEDICINE

## 2023-01-27 PROCEDURE — 27230 TREAT THIGH FRACTURE: CPT | Performed by: ORTHOPAEDIC SURGERY

## 2023-01-27 PROCEDURE — 65610000006 HC RM INTENSIVE CARE

## 2023-01-27 PROCEDURE — 74011000250 HC RX REV CODE- 250: Performed by: INTERNAL MEDICINE

## 2023-01-27 PROCEDURE — 74011250637 HC RX REV CODE- 250/637: Performed by: NURSE PRACTITIONER

## 2023-01-27 RX ORDER — NOREPINEPHRINE BITARTRATE/D5W 8 MG/250ML
.5-2 PLASTIC BAG, INJECTION (ML) INTRAVENOUS
Status: DISPENSED | OUTPATIENT
Start: 2023-01-27 | End: 2023-01-28

## 2023-01-27 RX ADMIN — NAFCILLIN SODIUM 2 G: 2 INJECTION, POWDER, LYOPHILIZED, FOR SOLUTION INTRAMUSCULAR; INTRAVENOUS at 11:56

## 2023-01-27 RX ADMIN — NOREPINEPHRINE BITARTRATE 4 MCG/MIN: 1 INJECTION, SOLUTION, CONCENTRATE INTRAVENOUS at 00:51

## 2023-01-27 RX ADMIN — NAFCILLIN SODIUM 2 G: 2 INJECTION, POWDER, LYOPHILIZED, FOR SOLUTION INTRAMUSCULAR; INTRAVENOUS at 00:43

## 2023-01-27 RX ADMIN — NAFCILLIN SODIUM 2 G: 2 INJECTION, POWDER, LYOPHILIZED, FOR SOLUTION INTRAMUSCULAR; INTRAVENOUS at 16:00

## 2023-01-27 RX ADMIN — SODIUM CHLORIDE, PRESERVATIVE FREE 10 ML: 5 INJECTION INTRAVENOUS at 14:00

## 2023-01-27 RX ADMIN — CASTOR OIL AND BALSAM, PERU: 788; 87 OINTMENT TOPICAL at 10:02

## 2023-01-27 RX ADMIN — ENOXAPARIN SODIUM 30 MG: 100 INJECTION SUBCUTANEOUS at 11:48

## 2023-01-27 RX ADMIN — ATORVASTATIN CALCIUM 10 MG: 10 TABLET, FILM COATED ORAL at 21:10

## 2023-01-27 RX ADMIN — Medication 1 AMPULE: at 21:11

## 2023-01-27 RX ADMIN — SODIUM CHLORIDE, PRESERVATIVE FREE 10 ML: 5 INJECTION INTRAVENOUS at 06:46

## 2023-01-27 RX ADMIN — NAFCILLIN SODIUM 2 G: 2 INJECTION, POWDER, LYOPHILIZED, FOR SOLUTION INTRAMUSCULAR; INTRAVENOUS at 10:00

## 2023-01-27 RX ADMIN — PANTOPRAZOLE SODIUM 40 MG: 40 TABLET, DELAYED RELEASE ORAL at 06:46

## 2023-01-27 RX ADMIN — NAFCILLIN SODIUM 2 G: 2 INJECTION, POWDER, LYOPHILIZED, FOR SOLUTION INTRAMUSCULAR; INTRAVENOUS at 21:09

## 2023-01-27 RX ADMIN — CASTOR OIL AND BALSAM, PERU: 788; 87 OINTMENT TOPICAL at 21:11

## 2023-01-27 RX ADMIN — ENOXAPARIN SODIUM 30 MG: 100 INJECTION SUBCUTANEOUS at 23:08

## 2023-01-27 RX ADMIN — NAFCILLIN SODIUM 2 G: 2 INJECTION, POWDER, LYOPHILIZED, FOR SOLUTION INTRAMUSCULAR; INTRAVENOUS at 05:03

## 2023-01-27 RX ADMIN — ACETAMINOPHEN 650 MG: 325 TABLET ORAL at 12:11

## 2023-01-27 RX ADMIN — PANTOPRAZOLE SODIUM 40 MG: 40 TABLET, DELAYED RELEASE ORAL at 17:43

## 2023-01-27 RX ADMIN — SODIUM CHLORIDE, PRESERVATIVE FREE 10 ML: 5 INJECTION INTRAVENOUS at 21:12

## 2023-01-27 RX ADMIN — POTASSIUM BICARBONATE 40 MEQ: 782 TABLET, EFFERVESCENT ORAL at 06:46

## 2023-01-27 NOTE — INTERDISCIPLINARY ROUNDS
Interdisciplinary team rounds were held 1/27/2023 with the following team members:Care Management, Diabetes Treatment Specialist, Nursing, Nutrition, Pharmacy, and Physician. Plan of care discussed. See clinical pathway and/or care plan for interventions and desired outcomes. Goals of the Day: To be awake during daytime more, try to improve night time cycle. Noted pressor needs.

## 2023-01-27 NOTE — PROGRESS NOTES
9699:  Bedside SBAR from [address]. 1048: Interdisciplinary rounds conducted. 1050:  Patient woken up, NC/5LPM applied. 1135:  Orthopedic PA rounded.   1943:  Bedside SBAR to [facility] RN

## 2023-01-27 NOTE — CONSULTS
ORTHO  CONSULT    Subjective:     Date of Consultation:  January 27, 2023    Referring Physician:  Cheryle Urias is a 60 y.o.  female who is being seen for left hip pain. Workup has revealed left femoral neck fracture. She is unsure of a specific injury. She does describe some left hip pain with movement. She is bedbound at baseline. 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    Sciatica 04/21/2022    Hypoxia 04/21/2022    COPD exacerbation ([address] 04/21/2022    CHF exacerbation ([address] 04/21/2022    SOB (shortness of breath) 12/04/2020    Physical deconditioning 12/04/2020    Counseling regarding advance care planning and goals of care 12/04/2020    Atrial flutter with rapid ventricular response ([address] 11/20/2020    JOSLYN (obstructive sleep apnea) 11/20/2020    Acute on chronic respiratory failure with hypoxia and hypercapnia ([address] 11/20/2020    Acute respiratory failure ([address] 11/18/2020    Mixed hyperlipidemia 11/15/2020    Morbid obesity ([address] 11/15/2020    A-fib ([address] 11/04/2020    S/P cardiac cath 11/04/2020    COVID-19 ruled out 07/24/2020     No family history on file.    Social History     Tobacco Use    Smoking status: Former    Smokeless tobacco: Never   Substance Use Topics    Alcohol use: No     Past Medical History:   Diagnosis Date    Acute on chronic respiratory failure with hypoxia and hypercapnia ([address] 11/20/2020    Atrial fibrillation ([address] 11/4/2020    CHF (congestive heart failure) ([address]     Diabetes ([address]     Hypertension     Ill-defined condition     high cholesterol    Ill-defined condition     tachycardia    JOSLYN (obstructive sleep apnea) 11/20/2020 Other and unspecified symptoms and signs involving general sensations and perceptions     ruptured disc    S/P cardiac cath 11/4/2020    11/3/2020 nonobstructive disease      Past Surgical History:   Procedure Laterality Date    PA COMPRE ELECTROPHYSIOL XM W/LEFT ATRIAL PACNG/REC N/A 11/23/2020    Lt Atrial Pace & Record During Ep Study performed by Wicho Tavares MD at [facility] CARDIAC CATH LAB    PA COMPRE EP EVAL ABLTJ 3D MAPG TX SVT N/A 11/23/2020    ABLATION A-FLUTTER performed by Wicho Tavares MD at [facility]. ADD ON N/A 11/23/2020    Ablation Svt/Vt Add On performed by Wicho Tavares MD at [facility] CARDIAC CATH LAB    PA INTRACARDIAC ELECTROPHYSIOLOGIC 3D MAPPING N/A 11/23/2020    Ep 3d Mapping performed by Wicho Tavares MD at [facility] CARDIAC CATH LAB    PA STIM/PACING HEART POST IV DRUG INFU N/A 11/23/2020    Drug Stimulation performed by Wicho Tavares MD at [facility] CARDIAC CATH LAB      Prior to Admission medications    Medication Sig Start Date End Date Taking? Authorizing Provider   rivaroxaban (Xarelto) 20 mg tab tablet Take  by mouth daily. Yes Provider, Historical   amiodarone (CORDARONE) 200 mg tablet Take  by mouth. Yes Provider, Historical   furosemide (LASIX) 20 mg tablet Take  by mouth daily. Yes Provider, Historical   atorvastatin (LIPITOR) 10 mg tablet Take 1 Tablet by mouth nightly. START AFTER COMPLETING ANTIBIOTICS TREATMENT ON 6/17 6/17/22  Yes Hussein Blake MD   metoprolol (LOPRESSOR) 25 mg tablet Take 25 mg by mouth two (2) times a day. Yes Rachael, MD Russ   clotrimazole-betamethasone (LOTRISONE) topical cream Apply at affected area 6/17/22   Kaylie Simpson MD   pantoprazole (PROTONIX) 40 mg tablet Take 1 Tablet by mouth Daily (before breakfast).   Patient not taking: Reported on 1/22/2023 6/18/22   Kaylie Simpson MD   albuterol-ipratropium (DUO-NEB) 2.5 mg-0.5 mg/3 ml nebu 3 mL by Nebulization route every four (4) hours as needed for Wheezing, Shortness of Breath or Respiratory Distress. 12/8/20   Shad Oliveira MD   acetaminophen (TYLENOL) 325 mg tablet Take 650 mg by mouth every six (6) hours as needed for Pain.     Provider, Historical     Current Facility-Administered Medications   Medication Dose Route Frequency    NOREPINephrine (LEVOPHED) 8 mg in 5% dextrose 250mL (32 mcg/mL) infusion  0.5-20 mcg/min IntraVENous TITRATE    enoxaparin (LOVENOX) injection 30 mg  30 mg SubCUTAneous Q12H    nafcillin (NALLPEN) 2 g in 0.9% sodium chloride (MBP/ADV) 100 mL MBP  2 g IntraVENous Q4H    alcohol 62% (NOZIN) nasal  1 Ampule  1 Ampule Topical Q12H    insulin lispro (HUMALOG) injection   SubCUTAneous Q6H    glucose chewable tablet 16 g  4 Tablet Oral PRN    glucagon (GLUCAGEN) injection 1 mg  1 mg IntraMUSCular PRN    dextrose 10% infusion 0-250 mL  0-250 mL IntraVENous PRN    [Held by provider] furosemide (LASIX) injection 20 mg  20 mg IntraVENous BID    [Held by provider] metoprolol tartrate (LOPRESSOR) tablet 12.5 mg  12.5 mg Oral BID    folic acid (FOLVITE) tablet 1 mg  1 mg Oral DAILY    albuterol-ipratropium (DUO-NEB) 2.5 MG-0.5 MG/3 ML  3 mL Nebulization Q4H PRN    atorvastatin (LIPITOR) tablet 10 mg  10 mg Oral QHS    pantoprazole (PROTONIX) tablet 40 mg  40 mg Oral ACB&D    sodium chloride (NS) flush 5-40 mL  5-40 mL IntraVENous Q8H    sodium chloride (NS) flush 5-40 mL  5-40 mL IntraVENous PRN    acetaminophen (TYLENOL) tablet 650 mg  650 mg Oral Q6H PRN    Or    acetaminophen (TYLENOL) suppository 650 mg  650 mg Rectal Q6H PRN    polyethylene glycol (MIRALAX) packet 17 g  17 g Oral DAILY PRN    ondansetron (ZOFRAN ODT) tablet 4 mg  4 mg Oral Q8H PRN    Or    ondansetron (ZOFRAN) injection 4 mg  4 mg IntraVENous Q6H PRN    dextrose 10% infusion 0-250 mL  0-250 mL IntraVENous PRN    balsam peru-castor oiL (VENELEX) ointment   Topical BID     Allergies   Allergen Reactions    Cefepime Rash     Tolerated zosyn 2022; tolerated nafcillin 2023    Cefazolin Rash    Other Plant, Animal, Environmental Rash     Ivory soap caused rash on hands. Review of Systems: She denies any fever or chills. She does note shortness of breath. Left hip pain noted    Objective:     Patient Vitals for the past 8 hrs:   BP Temp Pulse Resp SpO2 Weight   23 1200 -- 98.1 °F (36.7 °C) -- -- -- --   23 1100 (!) 115/49 -- 68 23 96 % --   23 1000 (!) 129/54 -- 63 20 99 % --   23 0959 -- -- -- -- -- 281 lb 12 oz (127.8 kg)   23 0900 (!) 119/57 97.1 °F (36.2 °C) 62 15 100 % --   23 0825 -- -- -- -- 96 % --   23 0800 (!) 105/55 -- 63 18 96 % --   23 0700 124/67 -- 65 18 97 % --   23 0506 -- -- -- -- 98 % --   23 0500 (!) 158/60 -- 74 23 99 % --     Temp (24hrs), Av °F (36.7 °C), Min:97.1 °F (36.2 °C), Max:98.4 °F (36.9 °C)    Physical exam:    General exam: Patient is awake, alert. Well-appearing. No acute distress. Bedbound at presentation    Heart/Lungs:  no respiratory distress, palpable pulses    Left hip: Pain with attempted passive range of motion. Mild swelling    Data Review   Recent Results (from the past 24 hour(s))   CULTURE, BLOOD, PAIRED    Collection Time: 23  5:29 PM    Specimen: Blood   Result Value Ref Range    Special Requests: NO SPECIAL REQUESTS      Culture result:        ONE OF FOUR BOTTLES HAS BEEN FLAGGED POSITIVE BY INSTRUMENT. BOTTLE HAS BEEN SENT TO [facility] TO ASSESS FOR POSSIBLE GROWTH.  REMAINING BOTTLE(S) HAS/HAVE NO GROWTH SO FAR   GLUCOSE, POC    Collection Time: 23  5:37 PM   Result Value Ref Range    Glucose (POC) 117 65 - 117 mg/dL    Performed by Deena Whiting RN    GLUCOSE, POC    Collection Time: 23 12:49 AM   Result Value Ref Range    Glucose (POC) 101 65 - 117 mg/dL    Performed by [facility] RN    CBC WITH AUTOMATED DIFF    Collection Time: 23  4:00 AM   Result Value Ref Range    WBC 16.3 (H) 3.6 - 11.0 K/uL    RBC 2.82 (L) 3.80 - 5.20 M/uL    HGB 8.1 (L) 11.5 - 16.0 g/dL    HCT 27.6 (L) 35.0 - 47.0 %    MCV 97.9 80.0 - 99.0 FL    MCH 28.7 26.0 - 34.0 PG    MCHC 29.3 (L) 30.0 - 36.5 g/dL    RDW 16.9 (H) 11.5 - 14.5 %    PLATELET 505 489 - 247 K/uL    MPV 10.9 8.9 - 12.9 FL    NRBC 0.5 (H) 0  WBC    ABSOLUTE NRBC 0.08 (H) 0.00 - 0.01 K/uL    NEUTROPHILS 85 (H) 32 - 75 %    LYMPHOCYTES 10 (L) 12 - 49 %    MONOCYTES 4 (L) 5 - 13 %    EOSINOPHILS 0 0 - 7 %    BASOPHILS 0 0 - 1 %    IMMATURE GRANULOCYTES 1 (H) 0.0 - 0.5 %    ABS. NEUTROPHILS 13.8 (H) 1.8 - 8.0 K/UL    ABS. LYMPHOCYTES 1.7 0.8 - 3.5 K/UL    ABS. MONOCYTES 0.7 0.0 - 1.0 K/UL    ABS. EOSINOPHILS 0.0 0.0 - 0.4 K/UL    ABS. BASOPHILS 0.0 0.0 - 0.1 K/UL    ABS. IMM. GRANS. 0.2 (H) 0.00 - 0.04 K/UL    DF AUTOMATED     METABOLIC PANEL, BASIC    Collection Time: 01/27/23  4:00 AM   Result Value Ref Range    Sodium 138 136 - 145 mmol/L    Potassium 3.3 (L) 3.5 - 5.1 mmol/L    Chloride 91 (L) 97 - 108 mmol/L    CO2 42 (HH) 21 - 32 mmol/L    Anion gap 5 5 - 15 mmol/L    Glucose 123 (H) 65 - 100 mg/dL    BUN 37 (H) 6 - 20 MG/DL    Creatinine 1.22 (H) 0.55 - 1.02 MG/DL    BUN/Creatinine ratio 30 (H) 12 - 20      eGFR 50 (L) >60 ml/min/1.73m2    Calcium 8.2 (L) 8.5 - 10.1 MG/DL   HEPATIC FUNCTION PANEL    Collection Time: 01/27/23  4:00 AM   Result Value Ref Range    Protein, total 6.9 6.4 - 8.2 g/dL    Albumin 2.1 (L) 3.5 - 5.0 g/dL    Globulin 4.8 (H) 2.0 - 4.0 g/dL    A-G Ratio 0.4 (L) 1.1 - 2.2      Bilirubin, total 4.2 (H) 0.2 - 1.0 MG/DL    Bilirubin, direct 2.8 (H) 0.0 - 0.2 MG/DL    Alk.  phosphatase 83 45 - 117 U/L    AST (SGOT) 54 (H) 15 - 37 U/L    ALT (SGPT) 26 12 - 78 U/L   PROCALCITONIN    Collection Time: 01/27/23  4:00 AM   Result Value Ref Range    Procalcitonin 0.44 ng/mL   PHOSPHORUS    Collection Time: 01/27/23  4:00 AM   Result Value Ref Range    Phosphorus 2.4 (L) 2.6 - 4.7 MG/DL   CULTURE, BLOOD, PAIRED    Collection Time: 01/27/23 4:45 AM    Specimen: Blood   Result Value Ref Range    Special Requests: NO SPECIAL REQUESTS      Culture result: NO GROWTH AFTER 2 HOURS     GLUCOSE, POC    Collection Time: 01/27/23 11:52 AM   Result Value Ref Range    Glucose (POC) 120 (H) 65 - 117 mg/dL    Performed by Joan Reynaga        XR Results (maximum last 3): Results from Hospital Encounter encounter on 01/21/23    XR HIP LT W OR WO PELV 2-3 VWS    Narrative  EXAM: XR HIP LT W OR WO PELV 2-3 VWS    INDICATION: hip pain. COMPARISON: 10/27/2014. FINDINGS: AP view of the pelvis and a frogleg lateral view of the left hip  demonstrate a superiorly displaced, rotated, left femoral neck fracture. End-stage right hip osteoarthritis and osteoporosis have developed since 2014. Impression  1. Displaced, rotated, left femoral neck fracture. 2. Osteoporosis, new from 2014. 3. End-stage right hip osteoarthritis, new from 2014. .      XR CHEST PORT    Narrative  EXAM:  XR CHEST PORT    INDICATION: Respiratory failure    COMPARISON: 1/25/2023    TECHNIQUE: Supine portable chest AP view    FINDINGS: Cardiac monitoring leads. Cardiomegaly. The pulmonary vasculature is  within normal limits. Probable small left pleural effusion. Airspace opacity in the right lung base  unchanged. Linear atelectasis in the right midlung zone. The visualized bones  and upper abdomen are age-appropriate. Impression  No significant change      XR CHEST PORT    Narrative  EXAM:  XR CHEST PORT    INDICATION: Desaturation    COMPARISON: 1/21/2023    TECHNIQUE: Semiupright portable chest AP view    FINDINGS: Cardiac monitoring leads. Cardiac megaly. The pulmonary vasculature is  within normal limits. Linear atelectasis right upper lobe. Probable small left pleural effusion  unchanged. The visualized bones and upper abdomen are age-appropriate. Impression  No significant change      CT Results (maximum last 3):   Results from [place] encounter on 05/23/22    CT HEAD WO CONT    Narrative  EXAM: CT HEAD WO CONT    INDICATION: 8/3/2020    COMPARISON: None. CONTRAST: None. TECHNIQUE: Unenhanced CT of the head was performed using 5 mm images. Brain and  bone windows were generated. Coronal and sagittal reformats. CT dose reduction  was achieved through use of a standardized protocol tailored for this  examination and automatic exposure control for dose modulation. FINDINGS:  The ventricles and sulci are normal in size, shape and configuration. . There is  moderate white matter disease likely to chronic small vessel ischemic disease. .  There is no intracranial hemorrhage, extra-axial collection, or mass effect. The  basilar cisterns are open. No CT evidence of acute infarct. The bone windows demonstrate no abnormalities. The visualized portions of the  paranasal sinuses and mastoid air cells are clear. Impression  No evidence of acute process. Results from [place] encounter on 05/03/22    CT BX BONE DP NDL/TROCAR    Narrative  PROCEDURE: CT-guided biopsy of left hip fracture fluid    ESTIMATED BLOOD LOSS: Less than 5mL    OPERATING PHYSICIAN: DENA Correa Dose: 5 cc blood-tinged fluid    INDICATION:  Left hip fracture, suspected osteomyelitis    Procedure and findings: CT dose reduction was achieved through use of a  standardized protocol tailored for this examination and automatic exposure  control for dose modulation. The risks and benefits of the procedure were  discussed with the patient. Written consent was obtained. Preliminary CT imaging  of the pelvis again demonstrated left hip fracture with a small amount of fluid  between the fracture fragments . An appropriate site for biopsy was marked on  the skin. The patient was prepped and draped in maximum sterile barrier  technique. 1% lidocaine was injected locally. A small dermatotomy was made.  A  20-gauge needle was advanced into the fluid space between the fracture fragments  under CT guidance. No fluid was aspirated initially. Approximately 10 cc of  saline were instilled for lavage, 5 cc of blood-tinged fluid were then obtained  under aspiration. The needle was then removed. The fluid was provided directly  to the pathology department for culturing. The patient tolerated the procedure  well. There were no immediate complications. Moderate intravenous conscious sedation was supervised by Dr. Lulu Ventura. The patient  was independently monitored by a registered nurse assigned to the Department of  Radiology using automated blood pressure, EKG, and pulse oximetry. The detail  conscious sedation record is stored in the hospital information system. Medication:  Versed: 4 mg  Fentanyl: 100 mcg  Intraprocedure time: 30 minutes    Impression  Successful CT-guided lavage and aspiration of fluid from space between fracture  fragments of the left hip. CT ABD PELV W CONT    Narrative  EXAM: CT ABD PELV W CONT    INDICATION: multiple decubitus ulcers, eval for associated abscess/osteo    COMPARISON: Abdominal radiograph 6/25/2020    CONTRAST: 100 mL of Isovue-[id number]. ORAL CONTRAST: None    TECHNIQUE:  Following the uneventful intravenous administration of contrast, thin axial  images were obtained through the abdomen and pelvis. Coronal and sagittal  reconstructions were generated. CT dose reduction was achieved through use of a  standardized protocol tailored for this examination and automatic exposure  control for dose modulation. FINDINGS:  LOWER THORAX: Cardiomegaly. Patchy atelectasis/scarring in the lung bases  LIVER: No mass. BILIARY TREE: Gallbladder is within normal limits. CBD is not dilated. SPLEEN: within normal limits. PANCREAS: Atrophic. No mass or ductal dilatation. ADRENALS: Unremarkable. KIDNEYS:  Several nonobstructing bilateral calculi measuring up to 10 mm in the  right upper pole. No hydronephrosis. No mass.  Subcentimeter left upper pole  hypodensity which is too small to accurately characterize; no dedicated  follow-up recommended  STOMACH: Unremarkable. SMALL BOWEL: No dilatation or wall thickening. COLON: No dilatation or wall thickening. APPENDIX: ? Partial appendectomy  PERITONEUM: No ascites or pneumoperitoneum. RETROPERITONEUM: No lymphadenopathy or aortic aneurysm. Atherosclerosis. REPRODUCTIVE ORGANS: Uterus and adnexa are within normal limits  URINARY BLADDER: No mass or calculus. BONES: Displaced left femoral neck fracture deformity with aggressive erosive  changes and sclerosis along the fracture margin ([id number])  ABDOMINAL WALL: No mass or hernia. ADDITIONAL COMMENTS: Anusha mesentery ([id number]). Impression  1. Displaced left femoral neck fracture deformity with aggressive erosive  changes and sclerosis along the fracture margin (177-64). Consider acute  osteomyelitis and/or septic arthritis given the clinical presentation. 2.  Bilateral nonobstructing nephrolithiasis. 3.  Anusha mesentery, nonspecific finding which can be seen with enteritis  etiologies (e.g., mesenteric panniculitis, enteritis, pancreatitis, etc.)      MRI Results (maximum last 3): Results from Hospital Encounter encounter on 05/03/22    MRI PELV W WO CONT    Narrative       INDICATION: r/o for pelvic osteo    COMPARISON: CT abdomen and pelvis 5/3/2022    TECHNIQUE: Axial, coronal, and sagittal pre and postcontrast MRI of the pelvis  in the T1, T2, and inversion-recovery pulse sequences with and without fat  saturation . CONTRAST: 20 mL ProHance. FINDINGS:    Bone marrow: Left femoral neck displaced fracture. Small amount of complex fluid  signal within the fracture gap. Surrounding marrow and enhancement, with  abnormal marrow signal within the distal fragment margin, may reflect acute  osteomyelitis. Right sacral and bilateral parasymphyseal pubic insufficiency  fractures. Nondisplaced fracture in the posterior aspect of the left greater  trochanter (4-15).  Curvilinear sclerosis in the right femoral head (4-19), may  reflect an age indeterminate subchondral fracture. Moderate bilateral pubic  symphysis arthropathy. Joint fluid: Physiologic. Tendons: Grossly intact. Muscles: Diffuse marked atrophy, fatty infiltration, and patchy edema signal.    Neurovascular bundles: Within normal limits. Articular cartilage: Full-thickness chondral loss and bony productive change in  the bilateral hips. Moderate pubic symphysis arthropathy. Soft tissues: Edema signal and enhancement surrounding the left femoral neck  fracture site. Impression  1. Redemonstrated left femoral neck displaced fracture with fluid in the  fracture gap and edema/enhancement and surrounding marrow and soft tissues. Findings may reflect a healing fracture with possible superimposed  osteomyelitis/osteonecrosis. 2.  Right sacral and bilateral parasymphyseal pubic insufficiency fractures. 3.  RIGHT femoral head and LEFT greater trochanter nondisplaced fractures. Results from [place] encounter on 11/28/14    MRI LUMB SPINE WO CONT    Narrative  **Final Report**      ICD Codes / Adm. Diagnosis: 722.93  719.45 / Other and unspecified disc dis  Examination:  MR L SPINE WO CON  - [id number] - Nov 28 2014  2:21PM  Accession No:  [id number]  Reason:  lumbar disc disease      REPORT:  EXAM:  MR L SPINE WO CON    INDICATION:  lumbar disc disease . Low back pain, left hip pain to foot. COMPARISON: None    TECHNIQUE: MR imaging of the lumbar spine was performed with sagittal T1,  T2, STIR;  axial T1, T2.    CONTRAST:  None. FINDINGS:    There is normal alignment of the lumbar spine. Vertebral body height is  mildly diminished at T12, nonacute. Marrow signal is normal.    The conus medullaris terminates at L1. Signal and caliber of the distal  spinal cord are intrinsically within normal limits. The paraspinal soft tissues are within normal limits.     T12-L1: Left posterior lateral disc osteophyte complex/herniation with left  lateral recess stenosis and compression of the conus medullaris. Left  foraminal narrowing, mild to moderate. Mild overall central stenosis. L1-L2:  Mild facet arthropathy. No herniation or stenosis. L2-L3:  No herniation or stenosis. L3-L4:  No herniation or stenosis. Mild facet arthropathy, right greater  than left. L4-L5:  No herniation or stenosis. Mild facet arthropathy, right greater  than left. L5-S1:  No herniation or stenosis. Mild facet arthropathy, right greater  than left. Impression  :    1. T12-L1: Mild central stenosis with significant left posterolateral disc  osteophyte complex/herniation, left lateral recess stenosis, left foraminal  stenosis and compression of the conus medullaris. 23X          Signing/Reading Doctor: Laisha Chinchilla ([id number])  Approved: Laisha Chinchilla ([id number])  Nov 28 2014  3:27PM      Nuclear Medicine Results (maximum last 3): No results found for this or any previous visit. US Results (maximum last 3): Results from [place] encounter on 05/23/22    US CHEST    Narrative  *[facility]**    EXAM:  US CHEST LTD    INDICATION: Dyspnea; pleural effusion    COMPARISON: None    TECHNIQUE: Grayscale ultrasound images of the left posterior thorax    FINDINGS: Ultrasound images of the left posterior thorax fail to demonstrate  sufficient fluid for diagnostic or therapeutic thoracentesis. Impression  Insufficient pleural fluid collection for diagnostic or therapeutic  thoracentesis. Results from [place] encounter on 09/22/16    US TRANSVAGINAL    Narrative  EXAM:  US PELV NON OBS, US TRANSVAGINAL    INDICATION:  Postmenopausal spotting. COMPARISON:  CT 3/26/2015    TECHNIQUE: Transabdominal and transvaginal ultrasound of the female pelvis. FINDINGS: Transabdominal ultrasound:  Urinary bladder: within normal limits.     Uterus: measures 8.8 x 4.1 x 4.5 cm, which is within normal limits. There is no  sonographic myometrial abnormality. Endometrium: 10 mm    The ovaries are not visualized due to bowel gas and postmenopausal state. There  is no adnexal mass. Free fluid: None. Endovaginal ultrasound:  Uterus: measures 11.0 x 5.3 x 4.8 cm, which is within normal limits. There is no  sonographic myometrial abnormality. Endometrium: 11 mm in thickness. Homogeneous. The ovaries are not visualized due to bowel gas and postmenopausal state. There  is no adnexal mass. Free fluid: None. Impression  IMPRESSION:  1. The endometrium is thickened for a postmenopausal patient. Correlate  clinically. Consider further evaluation with hysterosonography or hysteroscopy  as clinically warranted. 2. The ovaries are not visualized due to bowel gas and postmenopausal state. There is no adnexal mass. 23X      US PELV NON OBS    Narrative  EXAM:  US PELV NON OBS, US TRANSVAGINAL    INDICATION:  Postmenopausal spotting. COMPARISON:  CT 3/26/2015    TECHNIQUE: Transabdominal and transvaginal ultrasound of the female pelvis. FINDINGS: Transabdominal ultrasound:  Urinary bladder: within normal limits. Uterus: measures 8.8 x 4.1 x 4.5 cm, which is within normal limits. There is no  sonographic myometrial abnormality. Endometrium: 10 mm    The ovaries are not visualized due to bowel gas and postmenopausal state. There  is no adnexal mass. Free fluid: None. Endovaginal ultrasound:  Uterus: measures 11.0 x 5.3 x 4.8 cm, which is within normal limits. There is no  sonographic myometrial abnormality. Endometrium: 11 mm in thickness. Homogeneous. The ovaries are not visualized due to bowel gas and postmenopausal state. There  is no adnexal mass. Free fluid: None. Impression  IMPRESSION:  1. The endometrium is thickened for a postmenopausal patient. Correlate  clinically.  Consider further evaluation with hysterosonography or hysteroscopy  as clinically warranted. 2. The ovaries are not visualized due to bowel gas and postmenopausal state. There is no adnexal mass. 23X      DEXA Results (maximum last 3): No results found for this or any previous visit. KEEGAN Results (maximum last 3): Results from [place] encounter on 07/10/09    MAMMOGRAPHY, SCREENING    Narrative         ICD Codes / Adm. Diagnosis:    /   SCREENING  Examination:  MAMMOGRAM SCREEN BI  - [id number] - Jul 10 2009  9:08AM  Accession No:  [id number]  Reason:  SCREENING    REPORT:  INDICATION:  Baseline screening. FINDINGS:  Bilateral CC and MLO film screen mammography was obtained. No  old films are available for comparison. There is a mild amount of  fibroglandular elements. No dominant nodule or suspicious clustered  microcalcifications are evident. IMPRESSION:  BI-RADS CODE: 2, Benign findings. Baseline mammogram.  No definite signs of malignancy. Follow up is suggested with mammography in one year or earlier if clinical  symptoms warrant. The results of the mammogram were not relayed to the patient at the time of  the study; the patient will be informed by letter. Interpreting/Reading Doctor: Diya Garcia ([id number])  Transcribed:  on 07/16/2009  Approved: Diya Garcia ([id number])  07/16/2009        Distribution:  Attending Doctor: Jerica Funez Doctor: [facility]      IR Results (maximum last 3): Results from [place] encounter on 11/13/15    IR INJ SPINE L/S THERAPEUTIC SINGLE    Narrative  **Final Report**      ICD Codes / Adm. Diagnosis: 724.00  M48.00 / Spinal stenosis, unspecified r  Spinal stenosis, site unspec  ExaminationGloria Cindyman EPIDURAL  - [id number] - Nov 13 2015  1:00PM  Accession No:  [id number]  Reason:  back pain      REPORT:  CLINICAL HISTORY: Back pain    PROCEDURE: The patient was informed of the risks and benefits of the  procedure.  Informed consent was obtained and place in the patient's medical  record. MRI images were reviewed. The risks of bleeding, infection, allergy,  elevation of blood sugar, leg weakness, steroid FLAIR, low blood pressure  etc. were explained and understood. Following sterile prep and local anesthesia, percutaneous placement of a  22-gauge needle into the lumbar epidural space was performed via posterior  approach under fluoroscopic biplane imaging control. Following demonstration  of loss of resistance, a tiny amount (1 mL) of water soluble contrast was  injected to confirm needle tip position within the epidural space. Following  this 80 mg of Depo Medrol and  2 mL of 1% lidocaine mixed with normal saline  total volume of 10 and  slowly injected. The needle was removed and a  dressing placed. No complications were evident. The patient reported immediate pain relief after the procedure. FINDINGS: Tip of needle lies in the lumbar epidural space at L4-L5 level. Contrast opacifies the epidural space. Fluoroscopy: 0.2    Impression  :  Successful lumbar epidural steroid injection at the L4-L5 level. Signing/Reading Doctor: Ayad Garcia ([id number])  Emerson Vasquez ([id number])  Nov 13 2015  1:44PM      Results from Hospital Encounter encounter on 12/12/14    IR INJ SPINE L/S THERAPEUTIC SINGLE    Narrative  **Final Report**      ICD Codes / Adm. Diagnosis: 724.02  719.45 / Spinal stenosis, lumbar region  Angio  ExaminationMa Sides SACRAL EPIDURAL  - [id number] - Dec 12 2014  2:15PM  Accession No:  [id number]  Reason:  back pain      REPORT:  Epidural steroid injection    INDICATION: Pain    Informed consent obtained. At L3/4, a Chiba needle was advanced into the  epidural space under fluoroscopic guidance without difficulty. After loss of  resistance, a small amount of contrast was injected to confirm appropriate  position within the epidural space. Subsequently, 80 mg Depomedrol,  2 cc 1%  lidocaine, and 5 cc saline were injected.  The patient tolerated the  procedure well. There was no immediate complication. Fluoroscopy time: 0.5 minutes    Impression  : Successful epidural steroid injection as described above. Signing/Reading Doctor: Ryan Nunez ([id number])  Approved: Ryan Nunez ([id number])  Dec 12 2014  4:44PM      VAS/US Results (maximum last 3): No results found for this or any previous visit. PET Results (maximum last 3): No results found for this or any previous visit. Assessment/Plan:     Left femoral neck fracture acute on chronic    Findings were discussed with the patient and family today. Based on previous imaging she has likely been dealing with bilateral hip pain for a long time. She is too high risk for any surgical treatment and with her being bedbound I do not feel like surgery would provide her any benefit compared to nonoperative treatment. I recommend pain control as needed. No need to repeat x-rays.       Rosemarie Escobar, DO

## 2023-01-27 NOTE — PROGRESS NOTES
Critical Care Progress Note  Rizwan Wray MD          Date of Service:  2023  NAME:  Jules Zayas  :  1960  MRN:  [id number]      Subjective/Hospital course: Tegan Noland is a 60 y.o.  female with pertinent past medical history of chronic hypoxemic hypercapnic respiratory failure on 4 L/min home oxygen (daytime) and Trilogy at night, COPD, JOSLYN/OHS, diabetes mellitus, essential hypertension, hyperlipidemia,history duodenal ulcers, chronic anemia of indeterminate etiology who was admitted  with hemoglobin of 5.3. At the time, CXR demonstrates diffuse hazy interstitial opacities suggestive of edema with bandlike right upper lobe atelectasis. Patient was transfused with PRBC. GI consulted. GI recommends hematology evaluation. Deferred EGD at this time as patient doesn't have signs of overt bleeding and fecal occult blood test was negative. Per notes patient began complaining of increased shortness of breath on  and had desaturation with exertion (baseline 4LNC). She was given Lasix and CPAP. , Diamox was added for concern for \"CO2 is high\" . She was increased to [facility]. Overnight, she was increased to 10L then 15L early morning . ABG revealed 7.25/105/24 on 15L. She was placed on bipap and transferred to CCU as stepdown overflow.  - Remained on Bipap - avaps mode overnight. Mental status significantly improved.   Mild hypotension yesterday, started on low dose NE       Problem list:         Assessment/Plan:   PULMONARY:  Acute on chronic hypercapnic hypoxic respiratory failure  Flu covid negative   Appears she was getting CPAP on the floor not home Trilogy   Needs ventilatory support with Bipap or trilogy   Chronic metabolic alkalosis secondary to chronic respiratory acidosis  Base bicarb calc should be approx 40, with CO2 likely 70-80 per abg and metabolic panel         Plan  - avoid diamox   - Bipap adjusted to AVAPs with 7cc/kg   Transition to NC during day   Continue NIPPV with naps and nightly   - close monitoring of mental status   - Continue nebulized bronchodilators  - Supplemental O2 to maintain SpO2 90-92              Avoid hyperoxia         CARDIOVASCULAR/HEMODYNAMIC:  Mild hypotension with bipap   Volume overload   ECHO benign but poor windows  If persistently positive blood cx, will need to discuss YANNA        Current vasopressors: low dose NE      Plan    - ICU hemodynamic/cardiac monitoring  - MAP goal > 65 mmHg  - hold lasix due to hypotension           RENAL:  hypokalemia     Plan  - Monitor I/Os and Uo  - Monitor renal function panel intermittently  - Correct electrolytes as needed  - Avoid nephrotoxic meds     GI/NUTRITION:  Obesity   Risk for malnutrition      Plan  - advance diet as tolerated      INFECTIOUS DISEASE  Bacteremia  Blood culture 1/1 + MSSA 1/21  Blood culture 1/2 + GPC 1/25     Plan  - continue nafcillin  - repeat blood cultures daily until clear  - trend procal        HEME  Chronic anemia  Stable hgb   Hematology / GI consulted on floor         Plan  - appreciate heme recs   - Daily CBC  - Transfuse as needed to maintain Hgb > 7.0 gm/dL or for hemodynamically significant bleeding  - no plans for GI intervention. - continue protonix  - hold home xarelto  - start lovenox prophylaxis     NEURO  CO2 narcosis - improved      Plan    - ABCDEF mobility bundle  - PT/OT involvement when appropriate  RASS goal:  Current sedatives:  Current analgesics        ENDO  No acute issues     Plan  - accuchecks while NPO      Other  - complaining of hip / back pain   Xray hip 1/26 with femoral neck fracture   Consult Ortho   - right big toe infection s/p nail avulsion   Consult wound care    Code status: Full. Palliative care consulted on the floor  Will follow along. Care Plan discussed with: Patient/Family and Nurse     I personally spent 45 minutes of critical care time.   This is time spent at this critically ill patient's bedside actively involved in patient care as well as the coordination of care and discussions with the patient's family. This does not include any procedural time which has been billed separately. Review of Systems:   ROS   Back pain    Vital Signs:   Patient Vitals for the past 4 hrs:   SpO2   01/27/23 0506 98 %          Intake/Output Summary (Last 24 hours) at 1/27/2023 0711  Last data filed at 1/26/2023 2200  Gross per 24 hour   Intake 494.44 ml   Output 605 ml   Net -110.56 ml          Physical Examination:    Physical Exam  Sleeping arousable  On bipap, mildly diminished   RRR  Bilateral edema  Soft NT ND obese    Labs and Imaging:   Reviewed.       Medications:     Current Facility-Administered Medications   Medication Dose Route Frequency    NOREPINephrine (LEVOPHED) 8 mg in 5% dextrose 250mL (32 mcg/mL) infusion  0.5-20 mcg/min IntraVENous TITRATE    enoxaparin (LOVENOX) injection 30 mg  30 mg SubCUTAneous Q12H    nafcillin (NALLPEN) 2 g in 0.9% sodium chloride (MBP/ADV) 100 mL MBP  2 g IntraVENous Q4H    alcohol 62% (NOZIN) nasal  1 Ampule  1 Ampule Topical Q12H    insulin lispro (HUMALOG) injection   SubCUTAneous Q6H    glucose chewable tablet 16 g  4 Tablet Oral PRN    glucagon (GLUCAGEN) injection 1 mg  1 mg IntraMUSCular PRN    dextrose 10% infusion 0-250 mL  0-250 mL IntraVENous PRN    [Held by provider] furosemide (LASIX) injection 20 mg  20 mg IntraVENous BID    [Held by provider] metoprolol tartrate (LOPRESSOR) tablet 12.5 mg  12.5 mg Oral BID    folic acid (FOLVITE) tablet 1 mg  1 mg Oral DAILY    albuterol-ipratropium (DUO-NEB) 2.5 MG-0.5 MG/3 ML  3 mL Nebulization Q4H PRN    atorvastatin (LIPITOR) tablet 10 mg  10 mg Oral QHS    pantoprazole (PROTONIX) tablet 40 mg  40 mg Oral ACB&D    sodium chloride (NS) flush 5-40 mL  5-40 mL IntraVENous Q8H    sodium chloride (NS) flush 5-40 mL  5-40 mL IntraVENous PRN    acetaminophen (TYLENOL) tablet 650 mg  650 mg Oral Q6H PRN    Or acetaminophen (TYLENOL) suppository 650 mg  650 mg Rectal Q6H PRN    polyethylene glycol (MIRALAX) packet 17 g  17 g Oral DAILY PRN    ondansetron (ZOFRAN ODT) tablet 4 mg  4 mg Oral Q8H PRN    Or    ondansetron (ZOFRAN) injection 4 mg  4 mg IntraVENous Q6H PRN    dextrose 10% infusion 0-250 mL  0-250 mL IntraVENous PRN    balsam peru-castor oiL (VENELEX) ointment   Topical BID     ______________________________________________________________________  EXPECTED LENGTH OF STAY: 2d 16h  ACTUAL LENGTH OF STAY:          5                 Eusebio Morales MD   Pulmonary/CCM  [address] [phone]

## 2023-01-27 NOTE — PROGRESS NOTES
Hematology Oncology Progress Note    Follow up for: anemia    Chart notes reviewed since last visit. Case discussed with following: .     Patient complains of the following: still in ICU on BIPAP, patient sleeping    Additional concerns noted by the staff:     Patient Vitals for the past 24 hrs:   BP Temp Pulse Resp SpO2 Weight   01/27/23 1000 -- -- 63 20 99 % --   01/27/23 0959 -- -- -- -- -- 127.8 kg (281 lb 12 oz)   01/27/23 0900 (!) 119/57 97.1 °F (36.2 °C) 62 15 100 % --   01/27/23 0825 -- -- -- -- 96 % --   01/27/23 0800 (!) 105/55 -- 63 18 96 % --   01/27/23 0700 124/67 -- 65 18 97 % --   01/27/23 0506 -- -- -- -- 98 % --   01/27/23 0500 (!) 158/60 -- 74 23 99 % --   01/27/23 0445 (!) 102/51 -- 64 18 100 % --   01/27/23 0430 (!) 112/55 -- 63 18 100 % --   01/27/23 0415 124/61 -- (!) 58 20 99 % --   01/27/23 0400 123/63 97.9 °F (36.6 °C) (!) 56 18 99 % --   01/27/23 0345 125/64 -- 61 20 100 % --   01/27/23 0330 (!) 113/59 -- (!) 58 20 100 % --   01/27/23 0315 (!) 117/59 -- (!) 58 23 98 % --   01/27/23 0300 (!) 119/55 -- (!) 59 18 100 % --   01/27/23 0245 108/65 -- (!) 58 18 98 % --   01/27/23 0230 (!) 123/53 -- (!) 58 18 95 % --   01/27/23 0215 114/63 -- 64 23 97 % --   01/27/23 0200 (!) 91/45 -- 63 19 93 % --   01/27/23 0145 (!) 99/55 -- 63 20 96 % --   01/27/23 0130 (!) 123/55 -- 63 19 94 % --   01/27/23 0115 (!) 102/52 -- 62 23 96 % --   01/27/23 0100 (!) 103/50 -- 60 19 98 % --   01/27/23 0045 (!) 156/68 -- 62 24 100 % --   01/27/23 0030 (!) 116/58 -- (!) 57 21 94 % --   01/27/23 0015 (!) 124/54 -- (!) 57 19 97 % --   01/27/23 0000 (!) 121/58 98.3 °F (36.8 °C) (!) 59 21 99 % --   01/26/23 2351 -- -- -- -- 98 % --   01/26/23 2345 (!) 118/56 -- (!) 57 19 97 % --   01/26/23 2330 (!) 116/58 -- (!) 57 20 92 % --   01/26/23 2315 (!) 109/51 -- 61 19 94 % --   01/26/23 2300 (!) 95/51 -- 66 20 99 % --   01/26/23 2245 (!) 96/48 -- 63 16 100 % --   01/26/23 2230 (!) 99/46 -- 60 16 97 % --   01/26/23 2215 (!) 106/48 -- 64 16 92 % --   01/26/23 2200 (!) 96/50 -- (!) 58 20 96 % --   01/26/23 2145 (!) 95/44 -- (!) 58 18 95 % --   01/26/23 2130 (!) 99/41 -- 60 17 98 % --   01/26/23 2118 -- -- -- -- 96 % --   01/26/23 2115 (!) 105/44 -- (!) 58 18 98 % --   01/26/23 2100 (!) 106/44 -- 61 19 95 % --   01/26/23 2045 (!) 101/48 -- 60 14 99 % --   01/26/23 2030 (!) 115/53 -- 61 17 98 % --   01/26/23 2015 (!) 117/53 -- (!) 59 16 98 % --   01/26/23 2000 (!) 111/50 98 °F (36.7 °C) (!) 59 19 98 % --   01/26/23 1900 (!) 114/46 -- (!) 58 14 97 % --   01/26/23 1800 100/61 -- (!) 59 19 97 % --   01/26/23 1712 -- -- -- -- 95 % --   01/26/23 1700 (!) 112/90 -- 61 25 95 % --   01/26/23 1600 (!) 113/96 98.4 °F (36.9 °C) 65 17 90 % --   01/26/23 1403 112/67 -- (!) 56 20 97 % --   01/26/23 1400 (!) 99/44 -- (!) 56 20 97 % --   01/26/23 1300 (!) 99/54 -- (!) 59 27 95 % --   01/26/23 1200 (!) 90/50 98.2 °F (36.8 °C) (!) 59 19 96 % --   01/26/23 1100 (!) 111/47 -- 64 19 94 % --         Physical Examination:  Gen some distress  Resp some distress on BIPAP      Labs:  Recent Results (from the past 24 hour(s))   GLUCOSE, POC    Collection Time: 01/26/23 12:28 PM   Result Value Ref Range    Glucose (POC) 112 65 - 117 mg/dL    Performed by [facility]    CULTURE, BLOOD, PAIRED    Collection Time: 01/26/23  5:29 PM    Specimen: Blood   Result Value Ref Range    Special Requests: NO SPECIAL REQUESTS      Culture result: NO GROWTH AFTER 13 HOURS     GLUCOSE, POC    Collection Time: 01/26/23  5:37 PM   Result Value Ref Range    Glucose (POC) 117 65 - 117 mg/dL    [facility]    GLUCOSE, POC    Collection Time: 01/27/23 12:49 AM   Result Value Ref Range    Glucose (POC) 101 65 - 117 mg/dL    Performed by Jodie Nelson RN    CBC WITH AUTOMATED DIFF    Collection Time: 01/27/23  4:00 AM   Result Value Ref Range    WBC 16.3 (H) 3.6 - 11.0 K/uL    RBC 2.82 (L) 3.80 - 5.20 M/uL    HGB 8.1 (L) 11.5 - 16.0 g/dL    HCT 27.6 (L) 35.0 - 47.0 % MCV 97.9 80.0 - 99.0 FL    MCH 28.7 26.0 - 34.0 PG    MCHC 29.3 (L) 30.0 - 36.5 g/dL    RDW 16.9 (H) 11.5 - 14.5 %    PLATELET 336 807 - 266 K/uL    MPV 10.9 8.9 - 12.9 FL    NRBC 0.5 (H) 0  WBC    ABSOLUTE NRBC 0.08 (H) 0.00 - 0.01 K/uL    NEUTROPHILS 85 (H) 32 - 75 %    LYMPHOCYTES 10 (L) 12 - 49 %    MONOCYTES 4 (L) 5 - 13 %    EOSINOPHILS 0 0 - 7 %    BASOPHILS 0 0 - 1 %    IMMATURE GRANULOCYTES 1 (H) 0.0 - 0.5 %    ABS. NEUTROPHILS 13.8 (H) 1.8 - 8.0 K/UL    ABS. LYMPHOCYTES 1.7 0.8 - 3.5 K/UL    ABS. MONOCYTES 0.7 0.0 - 1.0 K/UL    ABS. EOSINOPHILS 0.0 0.0 - 0.4 K/UL    ABS. BASOPHILS 0.0 0.0 - 0.1 K/UL    ABS. IMM. GRANS. 0.2 (H) 0.00 - 0.04 K/UL    DF AUTOMATED     METABOLIC PANEL, BASIC    Collection Time: 01/27/23  4:00 AM   Result Value Ref Range    Sodium 138 136 - 145 mmol/L    Potassium 3.3 (L) 3.5 - 5.1 mmol/L    Chloride 91 (L) 97 - 108 mmol/L    CO2 42 (HH) 21 - 32 mmol/L    Anion gap 5 5 - 15 mmol/L    Glucose 123 (H) 65 - 100 mg/dL    BUN 37 (H) 6 - 20 MG/DL    Creatinine 1.22 (H) 0.55 - 1.02 MG/DL    BUN/Creatinine ratio 30 (H) 12 - 20      eGFR 50 (L) >60 ml/min/1.73m2    Calcium 8.2 (L) 8.5 - 10.1 MG/DL   HEPATIC FUNCTION PANEL    Collection Time: 01/27/23  4:00 AM   Result Value Ref Range    Protein, total 6.9 6.4 - 8.2 g/dL    Albumin 2.1 (L) 3.5 - 5.0 g/dL    Globulin 4.8 (H) 2.0 - 4.0 g/dL    A-G Ratio 0.4 (L) 1.1 - 2.2      Bilirubin, total 4.2 (H) 0.2 - 1.0 MG/DL    Bilirubin, direct 2.8 (H) 0.0 - 0.2 MG/DL    Alk.  phosphatase 83 45 - 117 U/L    AST (SGOT) 54 (H) 15 - 37 U/L    ALT (SGPT) 26 12 - 78 U/L   PROCALCITONIN    Collection Time: 01/27/23  4:00 AM   Result Value Ref Range    Procalcitonin 0.44 ng/mL   PHOSPHORUS    Collection Time: 01/27/23  4:00 AM   Result Value Ref Range    Phosphorus 2.4 (L) 2.6 - 4.7 MG/DL   CULTURE, BLOOD, PAIRED    Collection Time: 01/27/23  4:45 AM    Specimen: Blood   Result Value Ref Range    Special Requests: NO SPECIAL REQUESTS      Culture result: NO GROWTH AFTER 2 HOURS         Assessment and Plan:   Anemia  -folate and iron deficiency, started on folic acid and IV iron  -transfuse for HBG < 7  -no sign of hemolysis  -GI following, needing to optimise resp status before any attempt at EGD/colonoscopy, may never happen  -HBG stable    Resp distress  -now in ICU on BIPAP  -plan per ICU doc    Please call over the weekend with any questions.   Will have service follow up on Monday

## 2023-01-28 LAB
ALBUMIN SERPL-MCNC: 1.9 G/DL (ref 3.5–5)
ANION GAP SERPL CALC-SCNC: 7 MMOL/L (ref 5–15)
BACTERIA SPEC CULT: ABNORMAL
BASOPHILS # BLD: 0 K/UL (ref 0–0.1)
BASOPHILS NFR BLD: 0 % (ref 0–1)
BUN SERPL-MCNC: 40 MG/DL (ref 6–20)
BUN/CREAT SERPL: 30 (ref 12–20)
CALCIUM SERPL-MCNC: 7.9 MG/DL (ref 8.5–10.1)
CHLORIDE SERPL-SCNC: 91 MMOL/L (ref 97–108)
CO2 SERPL-SCNC: 40 MMOL/L (ref 21–32)
CREAT SERPL-MCNC: 1.32 MG/DL (ref 0.55–1.02)
DIFFERENTIAL METHOD BLD: ABNORMAL
EOSINOPHIL # BLD: 0 K/UL (ref 0–0.4)
EOSINOPHIL NFR BLD: 0 % (ref 0–7)
ERYTHROCYTE [DISTWIDTH] IN BLOOD BY AUTOMATED COUNT: 18 % (ref 11.5–14.5)
GLUCOSE BLD STRIP.AUTO-MCNC: 116 MG/DL (ref 65–117)
GLUCOSE BLD STRIP.AUTO-MCNC: 144 MG/DL (ref 65–117)
GLUCOSE BLD STRIP.AUTO-MCNC: 150 MG/DL (ref 65–117)
GLUCOSE BLD STRIP.AUTO-MCNC: 182 MG/DL (ref 65–117)
GLUCOSE SERPL-MCNC: 127 MG/DL (ref 65–100)
HCT VFR BLD AUTO: 27 % (ref 35–47)
HEMOCCULT STL QL: POSITIVE
HGB BLD-MCNC: 8 G/DL (ref 11.5–16)
IMM GRANULOCYTES # BLD AUTO: 0.2 K/UL (ref 0–0.04)
IMM GRANULOCYTES NFR BLD AUTO: 1 % (ref 0–0.5)
LYMPHOCYTES # BLD: 1.4 K/UL (ref 0.8–3.5)
LYMPHOCYTES NFR BLD: 9 % (ref 12–49)
MCH RBC QN AUTO: 29.2 PG (ref 26–34)
MCHC RBC AUTO-ENTMCNC: 29.6 G/DL (ref 30–36.5)
MCV RBC AUTO: 98.5 FL (ref 80–99)
MONOCYTES # BLD: 0.6 K/UL (ref 0–1)
MONOCYTES NFR BLD: 4 % (ref 5–13)
NEUTS SEG # BLD: 13.2 K/UL (ref 1.8–8)
NEUTS SEG NFR BLD: 86 % (ref 32–75)
NRBC # BLD: 0.12 K/UL (ref 0–0.01)
NRBC BLD-RTO: 0.8 PER 100 WBC
PHOSPHATE SERPL-MCNC: 2.4 MG/DL (ref 2.6–4.7)
PLATELET # BLD AUTO: 236 K/UL (ref 150–400)
PMV BLD AUTO: 10.7 FL (ref 8.9–12.9)
POTASSIUM SERPL-SCNC: 2.8 MMOL/L (ref 3.5–5.1)
RBC # BLD AUTO: 2.74 M/UL (ref 3.8–5.2)
SERVICE CMNT-IMP: ABNORMAL
SERVICE CMNT-IMP: NORMAL
SODIUM SERPL-SCNC: 138 MMOL/L (ref 136–145)
WBC # BLD AUTO: 15.5 K/UL (ref 3.6–11)

## 2023-01-28 PROCEDURE — 94660 CPAP INITIATION&MGMT: CPT

## 2023-01-28 PROCEDURE — 65610000006 HC RM INTENSIVE CARE

## 2023-01-28 PROCEDURE — C9399 UNCLASSIFIED DRUGS OR BIOLOG: HCPCS | Performed by: NURSE PRACTITIONER

## 2023-01-28 PROCEDURE — 74011250636 HC RX REV CODE- 250/636: Performed by: INTERNAL MEDICINE

## 2023-01-28 PROCEDURE — 74011000250 HC RX REV CODE- 250: Performed by: NURSE PRACTITIONER

## 2023-01-28 PROCEDURE — 74011000258 HC RX REV CODE- 258: Performed by: NURSE PRACTITIONER

## 2023-01-28 PROCEDURE — 74011000258 HC RX REV CODE- 258: Performed by: INTERNAL MEDICINE

## 2023-01-28 PROCEDURE — 74011250637 HC RX REV CODE- 250/637: Performed by: INTERNAL MEDICINE

## 2023-01-28 PROCEDURE — 74011250637 HC RX REV CODE- 250/637: Performed by: STUDENT IN AN ORGANIZED HEALTH CARE EDUCATION/TRAINING PROGRAM

## 2023-01-28 PROCEDURE — 74011000250 HC RX REV CODE- 250: Performed by: INTERNAL MEDICINE

## 2023-01-28 PROCEDURE — 74011000250 HC RX REV CODE- 250: Performed by: STUDENT IN AN ORGANIZED HEALTH CARE EDUCATION/TRAINING PROGRAM

## 2023-01-28 PROCEDURE — 82962 GLUCOSE BLOOD TEST: CPT

## 2023-01-28 PROCEDURE — 93005 ELECTROCARDIOGRAM TRACING: CPT

## 2023-01-28 PROCEDURE — 80069 RENAL FUNCTION PANEL: CPT

## 2023-01-28 PROCEDURE — 74011250636 HC RX REV CODE- 250/636: Performed by: NURSE PRACTITIONER

## 2023-01-28 PROCEDURE — 85025 COMPLETE CBC W/AUTO DIFF WBC: CPT

## 2023-01-28 PROCEDURE — 36415 COLL VENOUS BLD VENIPUNCTURE: CPT

## 2023-01-28 RX ORDER — NOREPINEPHRINE BITARTRATE/D5W 8 MG/250ML
.5-2 PLASTIC BAG, INJECTION (ML) INTRAVENOUS
Status: DISPENSED | OUTPATIENT
Start: 2023-01-28 | End: 2023-01-29

## 2023-01-28 RX ORDER — MIDODRINE HYDROCHLORIDE 5 MG/1
10 TABLET ORAL
Status: DISCONTINUED | OUTPATIENT
Start: 2023-01-28 | End: 2023-02-10 | Stop reason: HOSPADM

## 2023-01-28 RX ADMIN — SODIUM CHLORIDE, PRESERVATIVE FREE 10 ML: 5 INJECTION INTRAVENOUS at 13:07

## 2023-01-28 RX ADMIN — MIDODRINE HYDROCHLORIDE 10 MG: 5 TABLET ORAL at 16:24

## 2023-01-28 RX ADMIN — Medication 1 AMPULE: at 20:08

## 2023-01-28 RX ADMIN — NAFCILLIN SODIUM 2 G: 2 INJECTION, POWDER, LYOPHILIZED, FOR SOLUTION INTRAMUSCULAR; INTRAVENOUS at 13:56

## 2023-01-28 RX ADMIN — NAFCILLIN SODIUM 2 G: 2 INJECTION, POWDER, LYOPHILIZED, FOR SOLUTION INTRAMUSCULAR; INTRAVENOUS at 20:06

## 2023-01-28 RX ADMIN — NOREPINEPHRINE BITARTRATE 5 MCG/MIN: 1 INJECTION, SOLUTION, CONCENTRATE INTRAVENOUS at 03:55

## 2023-01-28 RX ADMIN — ENOXAPARIN SODIUM 30 MG: 100 INJECTION SUBCUTANEOUS at 23:29

## 2023-01-28 RX ADMIN — CASTOR OIL AND BALSAM, PERU: 788; 87 OINTMENT TOPICAL at 20:09

## 2023-01-28 RX ADMIN — NAFCILLIN SODIUM 2 G: 2 INJECTION, POWDER, LYOPHILIZED, FOR SOLUTION INTRAMUSCULAR; INTRAVENOUS at 03:57

## 2023-01-28 RX ADMIN — CASTOR OIL AND BALSAM, PERU: 788; 87 OINTMENT TOPICAL at 08:09

## 2023-01-28 RX ADMIN — NAFCILLIN SODIUM 2 G: 2 INJECTION, POWDER, LYOPHILIZED, FOR SOLUTION INTRAMUSCULAR; INTRAVENOUS at 10:08

## 2023-01-28 RX ADMIN — NAFCILLIN SODIUM 2 G: 2 INJECTION, POWDER, LYOPHILIZED, FOR SOLUTION INTRAMUSCULAR; INTRAVENOUS at 00:38

## 2023-01-28 RX ADMIN — Medication 1 AMPULE: at 08:08

## 2023-01-28 RX ADMIN — ENOXAPARIN SODIUM 30 MG: 100 INJECTION SUBCUTANEOUS at 10:35

## 2023-01-28 RX ADMIN — NAFCILLIN SODIUM 2 G: 2 INJECTION, POWDER, LYOPHILIZED, FOR SOLUTION INTRAMUSCULAR; INTRAVENOUS at 15:52

## 2023-01-28 RX ADMIN — MIDODRINE HYDROCHLORIDE 10 MG: 5 TABLET ORAL at 13:07

## 2023-01-28 RX ADMIN — ALBUMIN HUMAN 25 G: 0.25 SOLUTION INTRAVENOUS at 23:19

## 2023-01-28 RX ADMIN — SODIUM CHLORIDE, PRESERVATIVE FREE 10 ML: 5 INJECTION INTRAVENOUS at 05:32

## 2023-01-28 RX ADMIN — PANTOPRAZOLE SODIUM 40 MG: 40 TABLET, DELAYED RELEASE ORAL at 08:08

## 2023-01-28 RX ADMIN — SODIUM CHLORIDE 1000 ML: 9 INJECTION, SOLUTION INTRAVENOUS at 08:09

## 2023-01-28 RX ADMIN — FOLIC ACID 1 MG: 1 TABLET ORAL at 08:08

## 2023-01-28 RX ADMIN — SODIUM CHLORIDE, PRESERVATIVE FREE 10 ML: 5 INJECTION INTRAVENOUS at 21:06

## 2023-01-28 RX ADMIN — NOREPINEPHRINE BITARTRATE 16 MCG/MIN: 1 INJECTION, SOLUTION, CONCENTRATE INTRAVENOUS at 21:04

## 2023-01-28 RX ADMIN — ATORVASTATIN CALCIUM 10 MG: 10 TABLET, FILM COATED ORAL at 21:08

## 2023-01-28 RX ADMIN — PANTOPRAZOLE SODIUM 40 MG: 40 TABLET, DELAYED RELEASE ORAL at 15:53

## 2023-01-28 NOTE — PROGRESS NOTES
Critical Care Progress Note  Mariela Moy MD          Date of Service:  2023  NAME:  Elda Luna  :  1960  MRN:  [id number]      Subjective/Hospital course: Isela Tejada is a 60 y.o.  female with pertinent past medical history of chronic hypoxemic hypercapnic respiratory failure on 4 L/min home oxygen (daytime) and Trilogy at night, COPD, JOSLYN/OHS, diabetes mellitus, essential hypertension, hyperlipidemia,history duodenal ulcers, chronic anemia of indeterminate etiology who was admitted  with hemoglobin of 5.3. At the time, CXR demonstrates diffuse hazy interstitial opacities suggestive of edema with bandlike right upper lobe atelectasis. Patient was transfused with PRBC. GI consulted. GI recommends hematology evaluation. Deferred EGD at this time as patient doesn't have signs of overt bleeding and fecal occult blood test was negative. Per notes patient began complaining of increased shortness of breath on  and had desaturation with exertion (baseline 4LNC). She was given Lasix and CPAP. , Diamox was added for concern for \"CO2 is high\" . She was increased to [facility]. Overnight, she was increased to 10L then 15L early morning . ABG revealed 7.25/105/24 on 15L. She was placed on bipap and transferred to CCU as stepdown overflow.  - Remained on Bipap - avaps mode overnight. Mental status significantly improved.   Mild hypotension yesterday, started on low dose NE  : on 3 mcg of epi       Problem list:     Septic shock  Hypercarbic respiratory failure  Left femur fx      Assessment/Plan:   PULMONARY:  Acute on chronic hypercapnic hypoxic respiratory failure  Flu covid negative   Appears she was getting CPAP on the floor not home Trilogy   Needs ventilatory support with Bipap or trilogy   Chronic metabolic alkalosis secondary to chronic respiratory acidosis  Base bicarb calc should be approx 40, with CO2 likely 70-80 per abg and metabolic panel         Plan  - avoid diamox   - Bipap adjusted to AVAPs with 7cc/kg   Transition to NC during day   Continue NIPPV with naps and nightly   - close monitoring of mental status   - Continue nebulized bronchodilators  - Supplemental O2 to maintain SpO2 90-92              Avoid hyperoxia         CARDIOVASCULAR/HEMODYNAMIC:  Mild hypotension with bipap   Bolus 1L NS and taper off and d/c bipap  ECHO benign but poor windows  If persistently positive blood cx, will need to discuss YANNA        Current vasopressors: low dose NE      Plan    - ICU hemodynamic/cardiac monitoring  - MAP goal > 65 mmHg  - hold lasix due to hypotension           RENAL:  hypokalemia     Plan  - Monitor I/Os and Uo  - Monitor renal function panel intermittently  - Correct electrolytes as needed  - Avoid nephrotoxic meds     GI/NUTRITION:  Obesity   Risk for malnutrition      Plan  - advance diet as tolerated to cardiac diet     INFECTIOUS DISEASE  Bacteremia  Blood culture 1/1 + MSSA 1/21  Blood culture 1/2 + GPC 1/25     Plan  - continue nafcillin  - repeat blood cultures daily until clear  - trend procal        HEME  Chronic anemia  Stable hgb   Hematology / GI consulted on floor         Plan  - appreciate heme recs   - Daily CBC  - Transfuse as needed to maintain Hgb > 7.0 gm/dL or for hemodynamically significant bleeding  - no plans for GI intervention. - continue protonix  - hold home xarelto  - start lovenox prophylaxis     NEURO  CO2 narcosis - improved      Plan    - ABCDEF mobility bundle  - PT/OT involvement when appropriate  RASS goal:  Current sedatives:  Current analgesics        ENDO  No acute issues     Plan  - accuchecks while NPO      Other  - complaining of hip / back pain   Xray hip 1/26 with femoral neck fracture   Consult Ortho   - right big toe infection s/p nail avulsion   Consult wound care    Code status: With nurse Westbrook at bedside, discussed with patient about intubation and ACLS.  She does not want to be intubated. In the event of cardiac arrest, she wants chest compressions and shock but does not want intubation. Care Plan discussed with: Patient/ and Nurse     I personally spent 45 minutes of critical care time. This is time spent at this critically ill patient's bedside actively involved in patient care as well as the coordination of care and discussions with the patient's family. This does not include any procedural time which has been billed separately. Review of Systems:   ROS   Back pain    Vital Signs:   Patient Vitals for the past 4 hrs:   BP Pulse Resp SpO2   01/28/23 0500 (!) 92/41 74 21 92 %   01/28/23 0459 -- -- -- 94 %          Intake/Output Summary (Last 24 hours) at 1/28/2023 0855  Last data filed at 1/28/2023 0809  Gross per 24 hour   Intake 2747.81 ml   Output 1250 ml   Net 1497.81 ml          Physical Examination:    Physical Exam  Morbidly obese  HEENT: normal  Heart: s1,s2  Lungs : clear  Abd: soft  Ext: left femur fx  Right 1+ edema  Cns: AAO x4  Labs and Imaging:   Reviewed.       Medications:     Current Facility-Administered Medications   Medication Dose Route Frequency    NOREPINephrine (LEVOPHED) 8 mg in 5% dextrose 250mL (32 mcg/mL) infusion  0.5-20 mcg/min IntraVENous TITRATE    enoxaparin (LOVENOX) injection 30 mg  30 mg SubCUTAneous Q12H    nafcillin (NALLPEN) 2 g in 0.9% sodium chloride (MBP/ADV) 100 mL MBP  2 g IntraVENous Q4H    alcohol 62% (NOZIN) nasal  1 Ampule  1 Ampule Topical Q12H    insulin lispro (HUMALOG) injection   SubCUTAneous Q6H    glucose chewable tablet 16 g  4 Tablet Oral PRN    glucagon (GLUCAGEN) injection 1 mg  1 mg IntraMUSCular PRN    dextrose 10% infusion 0-250 mL  0-250 mL IntraVENous PRN    [Held by provider] furosemide (LASIX) injection 20 mg  20 mg IntraVENous BID    [Held by provider] metoprolol tartrate (LOPRESSOR) tablet 12.5 mg  12.5 mg Oral BID    folic acid (FOLVITE) tablet 1 mg  1 mg Oral DAILY    albuterol-ipratropium (DUO-NEB) 2.5 MG-0.5 MG/3 ML  3 mL Nebulization Q4H PRN    atorvastatin (LIPITOR) tablet 10 mg  10 mg Oral QHS    pantoprazole (PROTONIX) tablet 40 mg  40 mg Oral ACB&D    sodium chloride (NS) flush 5-40 mL  5-40 mL IntraVENous Q8H    sodium chloride (NS) flush 5-40 mL  5-40 mL IntraVENous PRN    acetaminophen (TYLENOL) tablet 650 mg  650 mg Oral Q6H PRN    Or    acetaminophen (TYLENOL) suppository 650 mg  650 mg Rectal Q6H PRN    polyethylene glycol (MIRALAX) packet 17 g  17 g Oral DAILY PRN    ondansetron (ZOFRAN ODT) tablet 4 mg  4 mg Oral Q8H PRN    Or    ondansetron (ZOFRAN) injection 4 mg  4 mg IntraVENous Q6H PRN    dextrose 10% infusion 0-250 mL  0-250 mL IntraVENous PRN    balsam peru-castor oiL (VENELEX) ointment   Topical BID     ______________________________________________________________________  EXPECTED LENGTH OF STAY: 2d 16h  ACTUAL LENGTH OF STAY:          [address] [facility] [address], MD   Pulmonary/CCM  [address] [phone]

## 2023-01-29 ENCOUNTER — APPOINTMENT (OUTPATIENT)
Dept: GENERAL RADIOLOGY | Age: 63
End: 2023-01-29
Attending: HOSPITALIST
Payer: MEDICARE

## 2023-01-29 LAB
ALBUMIN SERPL-MCNC: 2.1 G/DL (ref 3.5–5)
ALBUMIN/GLOB SERPL: 0.5 (ref 1.1–2.2)
ALP SERPL-CCNC: 84 U/L (ref 45–117)
ALT SERPL-CCNC: 30 U/L (ref 12–78)
ANION GAP SERPL CALC-SCNC: 7 MMOL/L (ref 5–15)
ANION GAP SERPL CALC-SCNC: 8 MMOL/L (ref 5–15)
ANION GAP SERPL CALC-SCNC: 9 MMOL/L (ref 5–15)
AST SERPL-CCNC: 59 U/L (ref 15–37)
ATRIAL RATE: 74 BPM
BASOPHILS # BLD: 0 K/UL (ref 0–0.1)
BASOPHILS NFR BLD: 0 % (ref 0–1)
BILIRUB DIRECT SERPL-MCNC: 3.9 MG/DL (ref 0–0.2)
BILIRUB SERPL-MCNC: 6 MG/DL (ref 0.2–1)
BUN SERPL-MCNC: 28 MG/DL (ref 6–20)
BUN SERPL-MCNC: 29 MG/DL (ref 6–20)
BUN SERPL-MCNC: 31 MG/DL (ref 6–20)
BUN/CREAT SERPL: 22 (ref 12–20)
BUN/CREAT SERPL: 22 (ref 12–20)
BUN/CREAT SERPL: 24 (ref 12–20)
CALCIUM SERPL-MCNC: 7.4 MG/DL (ref 8.5–10.1)
CALCIUM SERPL-MCNC: 7.6 MG/DL (ref 8.5–10.1)
CALCIUM SERPL-MCNC: 7.7 MG/DL (ref 8.5–10.1)
CALCULATED P AXIS, ECG09: 81 DEGREES
CALCULATED R AXIS, ECG10: -7 DEGREES
CALCULATED T AXIS, ECG11: 93 DEGREES
CHLORIDE SERPL-SCNC: 92 MMOL/L (ref 97–108)
CHLORIDE SERPL-SCNC: 93 MMOL/L (ref 97–108)
CHLORIDE SERPL-SCNC: 93 MMOL/L (ref 97–108)
CO2 SERPL-SCNC: 35 MMOL/L (ref 21–32)
CO2 SERPL-SCNC: 37 MMOL/L (ref 21–32)
CO2 SERPL-SCNC: 37 MMOL/L (ref 21–32)
CREAT SERPL-MCNC: 1.28 MG/DL (ref 0.55–1.02)
CREAT SERPL-MCNC: 1.3 MG/DL (ref 0.55–1.02)
CREAT SERPL-MCNC: 1.32 MG/DL (ref 0.55–1.02)
DIAGNOSIS, 93000: NORMAL
DIFFERENTIAL METHOD BLD: ABNORMAL
EOSINOPHIL # BLD: 0 K/UL (ref 0–0.4)
EOSINOPHIL NFR BLD: 0 % (ref 0–7)
ERYTHROCYTE [DISTWIDTH] IN BLOOD BY AUTOMATED COUNT: 19.3 % (ref 11.5–14.5)
GLOBULIN SER CALC-MCNC: 4.4 G/DL (ref 2–4)
GLUCOSE BLD STRIP.AUTO-MCNC: 148 MG/DL (ref 65–117)
GLUCOSE BLD STRIP.AUTO-MCNC: 152 MG/DL (ref 65–117)
GLUCOSE BLD STRIP.AUTO-MCNC: 154 MG/DL (ref 65–117)
GLUCOSE BLD STRIP.AUTO-MCNC: 167 MG/DL (ref 65–117)
GLUCOSE SERPL-MCNC: 161 MG/DL (ref 65–100)
GLUCOSE SERPL-MCNC: 166 MG/DL (ref 65–100)
GLUCOSE SERPL-MCNC: 169 MG/DL (ref 65–100)
HCT VFR BLD AUTO: 25.8 % (ref 35–47)
HGB BLD-MCNC: 7.7 G/DL (ref 11.5–16)
IMM GRANULOCYTES # BLD AUTO: 0.2 K/UL (ref 0–0.04)
IMM GRANULOCYTES NFR BLD AUTO: 1 % (ref 0–0.5)
LYMPHOCYTES # BLD: 1.4 K/UL (ref 0.8–3.5)
LYMPHOCYTES NFR BLD: 8 % (ref 12–49)
MAGNESIUM SERPL-MCNC: 2.1 MG/DL (ref 1.6–2.4)
MAGNESIUM SERPL-MCNC: 2.1 MG/DL (ref 1.6–2.4)
MCH RBC QN AUTO: 29.4 PG (ref 26–34)
MCHC RBC AUTO-ENTMCNC: 29.8 G/DL (ref 30–36.5)
MCV RBC AUTO: 98.5 FL (ref 80–99)
MONOCYTES # BLD: 0.7 K/UL (ref 0–1)
MONOCYTES NFR BLD: 4 % (ref 5–13)
NEUTS SEG # BLD: 14.8 K/UL (ref 1.8–8)
NEUTS SEG NFR BLD: 87 % (ref 32–75)
NRBC # BLD: 0.13 K/UL (ref 0–0.01)
NRBC BLD-RTO: 0.8 PER 100 WBC
P-R INTERVAL, ECG05: 174 MS
PHOSPHATE SERPL-MCNC: 2.5 MG/DL (ref 2.6–4.7)
PHOSPHATE SERPL-MCNC: 2.7 MG/DL (ref 2.6–4.7)
PLATELET # BLD AUTO: 220 K/UL (ref 150–400)
PMV BLD AUTO: 10.6 FL (ref 8.9–12.9)
POTASSIUM SERPL-SCNC: 2.4 MMOL/L (ref 3.5–5.1)
POTASSIUM SERPL-SCNC: 2.6 MMOL/L (ref 3.5–5.1)
POTASSIUM SERPL-SCNC: 2.9 MMOL/L (ref 3.5–5.1)
PROT SERPL-MCNC: 6.5 G/DL (ref 6.4–8.2)
Q-T INTERVAL, ECG07: 598 MS
QRS DURATION, ECG06: 84 MS
QTC CALCULATION (BEZET), ECG08: 663 MS
RBC # BLD AUTO: 2.62 M/UL (ref 3.8–5.2)
SERVICE CMNT-IMP: ABNORMAL
SODIUM SERPL-SCNC: 136 MMOL/L (ref 136–145)
SODIUM SERPL-SCNC: 137 MMOL/L (ref 136–145)
SODIUM SERPL-SCNC: 138 MMOL/L (ref 136–145)
TROPONIN-HIGH SENSITIVITY: 28 NG/L (ref 0–51)
VENTRICULAR RATE, ECG03: 74 BPM
WBC # BLD AUTO: 17.1 K/UL (ref 3.6–11)

## 2023-01-29 PROCEDURE — 82962 GLUCOSE BLOOD TEST: CPT

## 2023-01-29 PROCEDURE — 80048 BASIC METABOLIC PNL TOTAL CA: CPT

## 2023-01-29 PROCEDURE — 74011000250 HC RX REV CODE- 250: Performed by: INTERNAL MEDICINE

## 2023-01-29 PROCEDURE — 83735 ASSAY OF MAGNESIUM: CPT

## 2023-01-29 PROCEDURE — 74011250636 HC RX REV CODE- 250/636: Performed by: NURSE PRACTITIONER

## 2023-01-29 PROCEDURE — 80076 HEPATIC FUNCTION PANEL: CPT

## 2023-01-29 PROCEDURE — 74011250637 HC RX REV CODE- 250/637: Performed by: INTERNAL MEDICINE

## 2023-01-29 PROCEDURE — 74011000258 HC RX REV CODE- 258: Performed by: NURSE PRACTITIONER

## 2023-01-29 PROCEDURE — 74011250636 HC RX REV CODE- 250/636: Performed by: INTERNAL MEDICINE

## 2023-01-29 PROCEDURE — 93005 ELECTROCARDIOGRAM TRACING: CPT

## 2023-01-29 PROCEDURE — 85025 COMPLETE CBC W/AUTO DIFF WBC: CPT

## 2023-01-29 PROCEDURE — 77010033678 HC OXYGEN DAILY

## 2023-01-29 PROCEDURE — 84100 ASSAY OF PHOSPHORUS: CPT

## 2023-01-29 PROCEDURE — 74011250637 HC RX REV CODE- 250/637: Performed by: STUDENT IN AN ORGANIZED HEALTH CARE EDUCATION/TRAINING PROGRAM

## 2023-01-29 PROCEDURE — 71045 X-RAY EXAM CHEST 1 VIEW: CPT

## 2023-01-29 PROCEDURE — 74011000250 HC RX REV CODE- 250: Performed by: STUDENT IN AN ORGANIZED HEALTH CARE EDUCATION/TRAINING PROGRAM

## 2023-01-29 PROCEDURE — 94660 CPAP INITIATION&MGMT: CPT

## 2023-01-29 PROCEDURE — 65610000006 HC RM INTENSIVE CARE

## 2023-01-29 PROCEDURE — 74011000250 HC RX REV CODE- 250: Performed by: NURSE PRACTITIONER

## 2023-01-29 PROCEDURE — C9399 UNCLASSIFIED DRUGS OR BIOLOG: HCPCS | Performed by: NURSE PRACTITIONER

## 2023-01-29 PROCEDURE — 74011250636 HC RX REV CODE- 250/636: Performed by: HOSPITALIST

## 2023-01-29 PROCEDURE — 84484 ASSAY OF TROPONIN QUANT: CPT

## 2023-01-29 PROCEDURE — 74011000258 HC RX REV CODE- 258: Performed by: HOSPITALIST

## 2023-01-29 PROCEDURE — 74011000258 HC RX REV CODE- 258: Performed by: INTERNAL MEDICINE

## 2023-01-29 PROCEDURE — 87040 BLOOD CULTURE FOR BACTERIA: CPT

## 2023-01-29 PROCEDURE — 36415 COLL VENOUS BLD VENIPUNCTURE: CPT

## 2023-01-29 RX ORDER — POTASSIUM CHLORIDE 7.45 MG/ML
10 INJECTION INTRAVENOUS
Status: COMPLETED | OUTPATIENT
Start: 2023-01-29 | End: 2023-01-29

## 2023-01-29 RX ORDER — POTASSIUM CHLORIDE 7.45 MG/ML
10 INJECTION INTRAVENOUS
Status: COMPLETED | OUTPATIENT
Start: 2023-01-29 | End: 2023-01-30

## 2023-01-29 RX ORDER — MAGNESIUM SULFATE 1 G/100ML
1 INJECTION INTRAVENOUS ONCE
Status: COMPLETED | OUTPATIENT
Start: 2023-01-29 | End: 2023-01-29

## 2023-01-29 RX ORDER — NOREPINEPHRINE BITARTRATE/D5W 8 MG/250ML
.5-2 PLASTIC BAG, INJECTION (ML) INTRAVENOUS
Status: DISPENSED | OUTPATIENT
Start: 2023-01-29 | End: 2023-01-30

## 2023-01-29 RX ADMIN — SODIUM CHLORIDE, PRESERVATIVE FREE 10 ML: 5 INJECTION INTRAVENOUS at 05:08

## 2023-01-29 RX ADMIN — POTASSIUM CHLORIDE 10 MEQ: 7.46 INJECTION, SOLUTION INTRAVENOUS at 06:44

## 2023-01-29 RX ADMIN — NOREPINEPHRINE BITARTRATE 14 MCG/MIN: 1 INJECTION, SOLUTION, CONCENTRATE INTRAVENOUS at 22:29

## 2023-01-29 RX ADMIN — ENOXAPARIN SODIUM 30 MG: 100 INJECTION SUBCUTANEOUS at 23:31

## 2023-01-29 RX ADMIN — MIDODRINE HYDROCHLORIDE 10 MG: 5 TABLET ORAL at 12:16

## 2023-01-29 RX ADMIN — ALBUMIN HUMAN 25 G: 0.25 SOLUTION INTRAVENOUS at 05:04

## 2023-01-29 RX ADMIN — ENOXAPARIN SODIUM 30 MG: 100 INJECTION SUBCUTANEOUS at 12:28

## 2023-01-29 RX ADMIN — NOREPINEPHRINE BITARTRATE 17 MCG/MIN: 1 INJECTION, SOLUTION, CONCENTRATE INTRAVENOUS at 05:07

## 2023-01-29 RX ADMIN — MAGNESIUM SULFATE HEPTAHYDRATE 1 G: 1 INJECTION, SOLUTION INTRAVENOUS at 17:26

## 2023-01-29 RX ADMIN — POTASSIUM CHLORIDE 10 MEQ: 7.46 INJECTION, SOLUTION INTRAVENOUS at 09:06

## 2023-01-29 RX ADMIN — PANTOPRAZOLE SODIUM 40 MG: 40 TABLET, DELAYED RELEASE ORAL at 07:46

## 2023-01-29 RX ADMIN — POTASSIUM CHLORIDE 10 MEQ: 7.46 INJECTION, SOLUTION INTRAVENOUS at 07:48

## 2023-01-29 RX ADMIN — ALBUMIN HUMAN 25 G: 0.25 SOLUTION INTRAVENOUS at 12:25

## 2023-01-29 RX ADMIN — FOLIC ACID 1 MG: 1 TABLET ORAL at 08:05

## 2023-01-29 RX ADMIN — POTASSIUM CHLORIDE 10 MEQ: 10 INJECTION, SOLUTION INTRAVENOUS at 20:03

## 2023-01-29 RX ADMIN — NAFCILLIN SODIUM 2 G: 2 INJECTION, POWDER, LYOPHILIZED, FOR SOLUTION INTRAMUSCULAR; INTRAVENOUS at 12:26

## 2023-01-29 RX ADMIN — SODIUM CHLORIDE, PRESERVATIVE FREE 10 ML: 5 INJECTION INTRAVENOUS at 21:08

## 2023-01-29 RX ADMIN — CASTOR OIL AND BALSAM, PERU: 788; 87 OINTMENT TOPICAL at 08:05

## 2023-01-29 RX ADMIN — MIDODRINE HYDROCHLORIDE 10 MG: 5 TABLET ORAL at 07:46

## 2023-01-29 RX ADMIN — POTASSIUM CHLORIDE 10 MEQ: 10 INJECTION, SOLUTION INTRAVENOUS at 21:04

## 2023-01-29 RX ADMIN — NAFCILLIN SODIUM 2 G: 2 INJECTION, POWDER, LYOPHILIZED, FOR SOLUTION INTRAMUSCULAR; INTRAVENOUS at 07:57

## 2023-01-29 RX ADMIN — Medication 1 AMPULE: at 08:04

## 2023-01-29 RX ADMIN — POTASSIUM CHLORIDE 10 MEQ: 7.46 INJECTION, SOLUTION INTRAVENOUS at 05:46

## 2023-01-29 RX ADMIN — CASTOR OIL AND BALSAM, PERU: 788; 87 OINTMENT TOPICAL at 21:07

## 2023-01-29 RX ADMIN — POTASSIUM CHLORIDE 10 MEQ: 10 INJECTION, SOLUTION INTRAVENOUS at 18:49

## 2023-01-29 RX ADMIN — POTASSIUM CHLORIDE 10 MEQ: 10 INJECTION, SOLUTION INTRAVENOUS at 20:02

## 2023-01-29 RX ADMIN — SODIUM CHLORIDE, PRESERVATIVE FREE 10 ML: 5 INJECTION INTRAVENOUS at 13:29

## 2023-01-29 RX ADMIN — NAFCILLIN SODIUM 2 G: 2 INJECTION, POWDER, LYOPHILIZED, FOR SOLUTION INTRAMUSCULAR; INTRAVENOUS at 03:36

## 2023-01-29 RX ADMIN — POTASSIUM CHLORIDE 10 MEQ: 10 INJECTION, SOLUTION INTRAVENOUS at 18:43

## 2023-01-29 RX ADMIN — POTASSIUM CHLORIDE 10 MEQ: 10 INJECTION, SOLUTION INTRAVENOUS at 17:24

## 2023-01-29 RX ADMIN — POTASSIUM CHLORIDE 10 MEQ: 10 INJECTION, SOLUTION INTRAVENOUS at 22:00

## 2023-01-29 RX ADMIN — Medication 1 AMPULE: at 21:08

## 2023-01-29 RX ADMIN — POTASSIUM PHOSPHATE, MONOBASIC AND POTASSIUM PHOSPHATE, DIBASIC: 224; 236 INJECTION, SOLUTION, CONCENTRATE INTRAVENOUS at 05:48

## 2023-01-29 RX ADMIN — NAFCILLIN SODIUM 2 G: 2 INJECTION, POWDER, LYOPHILIZED, FOR SOLUTION INTRAMUSCULAR; INTRAVENOUS at 00:04

## 2023-01-29 RX ADMIN — POTASSIUM CHLORIDE 10 MEQ: 10 INJECTION, SOLUTION INTRAVENOUS at 23:00

## 2023-01-29 RX ADMIN — PIPERACILLIN AND TAZOBACTAM 3.38 G: 3; .375 INJECTION, POWDER, FOR SOLUTION INTRAVENOUS at 17:47

## 2023-01-29 RX ADMIN — NOREPINEPHRINE BITARTRATE 16 MCG/MIN: 1 INJECTION, SOLUTION, CONCENTRATE INTRAVENOUS at 13:29

## 2023-01-29 RX ADMIN — NAFCILLIN SODIUM 2 G: 2 INJECTION, POWDER, LYOPHILIZED, FOR SOLUTION INTRAMUSCULAR; INTRAVENOUS at 16:15

## 2023-01-29 NOTE — PROGRESS NOTES
Critical Care Progress Note  Jackson Christy MD          Date of Service:  2023  NAME:  Melissa Coleman  :  1960  MRN:  [id number]      Subjective/Hospital course: Carl Dunn is a 60 y.o.  female with pertinent past medical history of chronic hypoxemic hypercapnic respiratory failure on 4 L/min home oxygen (daytime) and Trilogy at night, COPD, JOSLYN/OHS, diabetes mellitus, essential hypertension, hyperlipidemia,history duodenal ulcers, chronic anemia of indeterminate etiology who was admitted  with hemoglobin of 5.3. At the time, CXR demonstrates diffuse hazy interstitial opacities suggestive of edema with bandlike right upper lobe atelectasis. Patient was transfused with PRBC. GI consulted. GI recommends hematology evaluation. Deferred EGD at this time as patient doesn't have signs of overt bleeding and fecal occult blood test was negative. Per notes patient began complaining of increased shortness of breath on  and had desaturation with exertion (baseline 4LNC). She was given Lasix and CPAP. , Diamox was added for concern for \"CO2 is high\" . She was increased to [facility]. Overnight, she was increased to 10L then 15L early morning . ABG revealed 7.25/105/24 on 15L. She was placed on bipap and transferred to CCU as stepdown overflow.  - Remained on Bipap - avaps mode overnight. Mental status significantly improved. Mild hypotension yesterday, started on low dose NE  : on 3 mcg of epi  : Patient had an episode of V tach this morning. She is alert and oriented. No acute events overnight. Problem list:     Septic shock  Hypercarbic respiratory failure  MSSA bacteremia.   Left femur fx      Assessment/Plan:   PULMONARY:  Acute on chronic hypercapnic hypoxic respiratory failure  Flu covid negative   Appears she was getting CPAP on the floor not home Trilogy   Needs ventilatory support with Bipap or trilogy   Chronic metabolic alkalosis secondary to chronic respiratory acidosis  Base bicarb calc should be approx 40, with CO2 likely 70-80 per abg and metabolic panel         Plan  - avoid diamox   - Bipap adjusted to AVAPs with 7cc/kg   Transition to NC during day   Continue NIPPV with naps and nightly   - close monitoring of mental status   - Continue nebulized bronchodilators  - Supplemental O2 to maintain SpO2 90-92              Avoid hyperoxia   -Holding diuresis now due to severe hypokalemia. CARDIOVASCULAR/HEMODYNAMIC:  Mild hypotension with bipap   Bolus 1L NS and taper off and d/c bipap  V tach. Likely from severe hypokalemia. Keep mag >2 and K >4. ECHO benign but poor windows  If persistently positive blood cx, will need to discuss YANNA but high risk at this point. Would need to be intubated for that. Current vasopressors: low dose NE      Plan    - ICU hemodynamic/cardiac monitoring  - MAP goal > 65 mmHg  - hold lasix due to hypotension and severe hypokalemia. RENAL:  hypokalemia     Plan  - Monitor I/Os and Uo  - Monitor renal function panel intermittently  - Correct electrolytes as needed  - Avoid nephrotoxic meds     GI/NUTRITION:  Obesity   Risk for malnutrition      Plan  - advance diet as tolerated to cardiac diet     INFECTIOUS DISEASE  Bacteremia  Blood culture 1/1 + MSSA 1/21  Blood culture 1/2 + GPC 1/25     Plan  - continue nafcillin  - repeat blood cultures daily until clear  - trend procal        HEME  Chronic anemia  Stable hgb   Hematology / GI consulted on floor         Plan  - appreciate heme recs   - Daily CBC  - Transfuse as needed to maintain Hgb > 7.0 gm/dL or for hemodynamically significant bleeding  - no plans for GI intervention.    - continue protonix  - hold home xarelto  - start lovenox prophylaxis     NEURO  CO2 narcosis - improved      Plan    - ABCDEF mobility bundle  - PT/OT involvement when appropriate  RASS goal:  Current sedatives:  Current analgesics        ENDO  No acute issues Plan  - accuchecks while NPO      Other  - complaining of hip / back pain   Xray hip 1/26 with femoral neck fracture   Consult Ortho   - right big toe infection s/p nail avulsion   Consult wound care    Code status: With nurse Dariana at bedside, discussed with patient about intubation and ACLS. She does not want to be intubated. In the event of cardiac arrest, she wants chest compressions and shock but does not want intubation. Care Plan discussed with: Patient/ and Nurse     I personally spent 45 minutes of critical care time. This is time spent at this critically ill patient's bedside actively involved in patient care as well as the coordination of care and discussions with the patient's family. This does not include any procedural time which has been billed separately. Review of Systems:   ROS   Back pain    Vital Signs:   Patient Vitals for the past 4 hrs:   BP Temp Pulse Resp SpO2   01/29/23 1115 -- -- -- -- 93 %   01/29/23 0909 -- -- -- -- 91 %   01/29/23 0800 (!) 116/53 98.5 °F (36.9 °C) 77 25 91 %   01/29/23 0746 (!) 139/52 -- 78 -- --          Intake/Output Summary (Last 24 hours) at 1/29/2023 1140  Last data filed at 1/29/2023 1023  Gross per 24 hour   Intake 1663.45 ml   Output 825 ml   Net 838.45 ml          Physical Examination:    Physical Exam  Morbidly obese  HEENT: normal  Heart: s1,s2  Lungs : clear  Abd: soft  Ext: left femur fx  Right 1+ edema  Cns: AAO x4  Labs and Imaging:   Reviewed.       Medications:     Current Facility-Administered Medications   Medication Dose Route Frequency    NOREPINephrine (LEVOPHED) 8 mg in 5% dextrose 250mL (32 mcg/mL) infusion  0.5-20 mcg/min IntraVENous TITRATE    potassium phosphate 15 mmol in 0.9% sodium chloride 250 mL infusion   IntraVENous ONCE    midodrine (PROAMATINE) tablet 10 mg  10 mg Oral TID WITH MEALS    albumin, human-kjda 25% (ALBUMINEX) intravenous solution 25 g  25 g IntraVENous Q6H    enoxaparin (LOVENOX) injection 30 mg  30 mg SubCUTAneous Q12H    nafcillin (NALLPEN) 2 g in 0.9% sodium chloride (MBP/ADV) 100 mL MBP  2 g IntraVENous Q4H    alcohol 62% (NOZIN) nasal  1 Ampule  1 Ampule Topical Q12H    insulin lispro (HUMALOG) injection   SubCUTAneous Q6H    glucose chewable tablet 16 g  4 Tablet Oral PRN    glucagon (GLUCAGEN) injection 1 mg  1 mg IntraMUSCular PRN    [Held by provider] furosemide (LASIX) injection 20 mg  20 mg IntraVENous BID    [Held by provider] metoprolol tartrate (LOPRESSOR) tablet 12.5 mg  12.5 mg Oral BID    folic acid (FOLVITE) tablet 1 mg  1 mg Oral DAILY    albuterol-ipratropium (DUO-NEB) 2.5 MG-0.5 MG/3 ML  3 mL Nebulization Q4H PRN    atorvastatin (LIPITOR) tablet 10 mg  10 mg Oral QHS    pantoprazole (PROTONIX) tablet 40 mg  40 mg Oral ACB&D    sodium chloride (NS) flush 5-40 mL  5-40 mL IntraVENous Q8H    sodium chloride (NS) flush 5-40 mL  5-40 mL IntraVENous PRN    acetaminophen (TYLENOL) tablet 650 mg  650 mg Oral Q6H PRN    Or    acetaminophen (TYLENOL) suppository 650 mg  650 mg Rectal Q6H PRN    polyethylene glycol (MIRALAX) packet 17 g  17 g Oral DAILY PRN    ondansetron (ZOFRAN ODT) tablet 4 mg  4 mg Oral Q8H PRN    Or    ondansetron (ZOFRAN) injection 4 mg  4 mg IntraVENous Q6H PRN    dextrose 10% infusion 0-250 mL  0-250 mL IntraVENous PRN    balsam peru-castor oiL (VENELEX) ointment   Topical BID     ______________________________________________________________________  EXPECTED LENGTH OF STAY: 2d 16h  ACTUAL LENGTH OF STAY:          [address], MD   Pulmonary/[facility] Critical [facility]  [phone]        Addendum:  -Patient is alert and oriented x4. She wanted to changed her code status to DNR and did not want intubation at all. I asked her to inform her family of her decision so that they are on the same page. She then wanted me to call her daughter and update her about DNR. I called her daughter and gave her clinical update and also informed about DNR.  Also changed her code status to DNR.     Kali Aggarwal MD

## 2023-01-29 NOTE — PROGRESS NOTES
Received consult for vfib. Chart reviewed. EF 60-65%. Severe hypokalemia probably precipitation arrhythmia. Correct potassium level to >4. Discussed with RN.

## 2023-01-29 NOTE — PROGRESS NOTES
1920: Bedside shift change report received from [place]. Report included the following information SBAR, Kardex, Intake/Output, MAR, Recent Results, and Cardiac Rhythm NSR .   2000:Shift assessment completed; see flow sheet  2030: Wound care done to right great toe per providers orders; old dressing removed; noted scant amount of dark red blood on old dressing when removed; new dressing placed and affixed with tape; no odor or drainage noted coming from wound; no complaints voiced from patient. 2045: CHG bath given; wound care done to buttocks and abdominal pannus per providers orders; Patient complained of pain when turning and repositioning in bed, patient states that is normal for her. 0000:reassessment completed; see flow sheet  0400:reassessment completed' see flow sheet  End of Shift Note    Bedside shift change report given to David Zurita (oncoming nurse) by Ely Bahena RN (offgoing nurse). Report included the following information SBAR, Kardex, Intake/Output, MAR, and Cardiac Rhythm NSR    Shift worked:  [employer] phone for oncoming shift:   [phone]       Activity:  Activity Level: Bed Rest  Number times ambulated in hallways past shift: 0  Number of times OOB to chair past shift: 0    Cardiac:   Cardiac Monitoring: Yes      Cardiac Rhythm: Sinus Rhythm    Access:  Current line(s): midline     Genitourinary:   Urinary status: voiding and external catheter    Respiratory:   O2 Device: Nasal cannula  Chronic home O2 use?: YES  Incentive spirometer at bedside: NO       GI:  Last Bowel Movement Date: 01/28/23  Current diet:  ADULT ORAL NUTRITION SUPPLEMENT Breakfast; Standard 4 oz  ADULT ORAL NUTRITION SUPPLEMENT Lunch; Low Calorie/High Protein  ADULT ORAL NUTRITION SUPPLEMENT Dinner; Renal Supplement  DIET ONE TIME MESSAGE  ADULT DIET Dysphagia - Soft & Bite Sized; 4 carb choices (60 gm/meal);  Likes peas, carrots, mashed potatoes  Passing flatus: YES  Tolerating current diet: YES       Pain Management:   Patient states pain is manageable on current regimen: YES    Skin:  Gaston Score: 11  Interventions: speciality bed, float heels, limit briefs, internal/external urinary devices, and nutritional support     Patient Safety:  Fall Score:  Total Score: 1  Interventions: assistive device (walker, cane, etc), pt to call before getting OOB, and stay with me (per policy)       Length of Stay:  Expected LOS: 2d 16h  Actual LOS: 6      Dayan Hardwick RN

## 2023-01-29 NOTE — PROGRESS NOTES
Problem: Pressure Injury - Risk of  Goal: *Prevention of pressure injury  Description: Document Gaston Scale and appropriate interventions in the flowsheet. Outcome: Progressing Towards Goal  Note: Pressure Injury Interventions:  Sensory Interventions: Assess changes in LOC, Assess need for specialty bed, Avoid rigorous massage over bony prominences, Check visual cues for pain, Discuss PT/OT consult with provider, Float heels, Keep linens dry and wrinkle-free, Maintain/enhance activity level, Minimize linen layers, Monitor skin under medical devices, Pad between skin to skin, Pressure redistribution bed/mattress (bed type), Turn and reposition approx. every two hours (pillows and wedges if needed), Use 30-degree side-lying position    Moisture Interventions: Absorbent underpads, Apply protective barrier, creams and emollients, Assess need for specialty bed, Check for incontinence Q2 hours and as needed, Contain wound drainage, Internal/External urinary devices, Limit adult briefs, Maintain skin hydration (lotion/cream), Minimize layers, Moisture barrier    Activity Interventions: Assess need for specialty bed, Pressure redistribution bed/mattress(bed type)    Mobility Interventions: Assess need for specialty bed, Float heels, HOB 30 degrees or less, Pressure redistribution bed/mattress (bed type), Turn and reposition approx.  every two hours(pillow and wedges)    Nutrition Interventions: Document food/fluid/supplement intake, Discuss nutritional consult with provider    Friction and Shear Interventions: Apply protective barrier, creams and emollients, Feet elevated on foot rest, HOB 30 degrees or less, Lift sheet, Minimize layers, Transfer aides:transfer board/Soheila lift/ceiling lift, Transferring/repositioning devices

## 2023-01-29 NOTE — PROGRESS NOTES
0700:  Bedside SBAR from [address] [place] [address]  9553:  Code called directly after coming off the BIPAP and being placed onto NC/4L. Patient showed V tach on the monitor. She quickly recovered and had a strong radial pulse. No compressions were administered. 5692:  EKG shows NSR. EKG electrodes all replaced and cables tightened up. Now viewing from Lead 1 for a better rhythm. 2527:  Patient ate approx. 30% of her breakfast.  She drank the chocolate ensure because I encouraged the nutritional intake of it.  1006:  Updated Arthur, the patient's daughter. 1023:  Patient c/o SOB. Placed patient on BIPAP. Dr. Ronaldo Ceja notified. 1219: At 1213, placed the patient on NC and she desat'd. Dr. Ronaldo Ceja notified of Olman Avila on NC/5L at this time. Patient placed back on BIPAP. 1420:  Portable chest xray completed at this time. Patient turned to the right - per patient request to stay on the right because of left hip pain. 1612:  Dr. Almita Perkins returned my call for consultation of cardiology  [phone]: While in the room attending to the 1600 medications, I witnessed the patients' eyes roll back up in her head, and Vfib on the monitor. There was no pulse, and the patient was unresponsive. I pushed the code blue button, and began compression to approximately ten compressions, when the patient awoke and pushed me off. Patient alert after that, confused and scared. Vfib lasted about forty seconds. 1635:  Ordered labs for Dr. Ronaldo Ceja.  9235:  Left a message for Dr. Almita Perkins through the answering service. 1650:  Dr. Almita Perkins called and advised he will call back when the labs are done and review the results. 1800:  Dr. Ronaldo Ceja has verbally ordered potassium chloride 10mEq/100ml to run in simultaneously in separate peripheral IV sites. Pharmacy Gen has agreed. 1807:  Patient and her daughter have decided to have the patient made a DNR. 1920:  Patient's family have gathered in her room for end of life visitation.   1925: Bedside SBAR to Viet Freedman

## 2023-01-30 ENCOUNTER — APPOINTMENT (OUTPATIENT)
Dept: CT IMAGING | Age: 63
End: 2023-01-30
Attending: INTERNAL MEDICINE
Payer: MEDICARE

## 2023-01-30 LAB
ALBUMIN SERPL ELPH-MCNC: 2.3 G/DL (ref 2.9–4.4)
ALBUMIN/GLOB SERPL: 0.6 (ref 0.7–1.7)
ALPHA1 GLOB SERPL ELPH-MCNC: 0.2 G/DL (ref 0–0.4)
ALPHA2 GLOB SERPL ELPH-MCNC: 0.8 G/DL (ref 0.4–1)
ANION GAP SERPL CALC-SCNC: 5 MMOL/L (ref 5–15)
ANION GAP SERPL CALC-SCNC: 5 MMOL/L (ref 5–15)
ANION GAP SERPL CALC-SCNC: 6 MMOL/L (ref 5–15)
ATRIAL RATE: 82 BPM
ATRIAL RATE: 82 BPM
B-GLOBULIN SERPL ELPH-MCNC: 1.4 G/DL (ref 0.7–1.3)
BUN SERPL-MCNC: 18 MG/DL (ref 6–20)
BUN SERPL-MCNC: 21 MG/DL (ref 6–20)
BUN SERPL-MCNC: 24 MG/DL (ref 6–20)
BUN/CREAT SERPL: 18 (ref 12–20)
BUN/CREAT SERPL: 19 (ref 12–20)
BUN/CREAT SERPL: 20 (ref 12–20)
CALCIUM SERPL-MCNC: 7.3 MG/DL (ref 8.5–10.1)
CALCIUM SERPL-MCNC: 7.6 MG/DL (ref 8.5–10.1)
CALCIUM SERPL-MCNC: 7.7 MG/DL (ref 8.5–10.1)
CALCULATED P AXIS, ECG09: 60 DEGREES
CALCULATED P AXIS, ECG09: 90 DEGREES
CALCULATED R AXIS, ECG10: -19 DEGREES
CALCULATED T AXIS, ECG11: 144 DEGREES
CALCULATED T AXIS, ECG11: 79 DEGREES
CHLORIDE SERPL-SCNC: 95 MMOL/L (ref 97–108)
CHLORIDE SERPL-SCNC: 96 MMOL/L (ref 97–108)
CHLORIDE SERPL-SCNC: 96 MMOL/L (ref 97–108)
CO2 SERPL-SCNC: 35 MMOL/L (ref 21–32)
CO2 SERPL-SCNC: 35 MMOL/L (ref 21–32)
CO2 SERPL-SCNC: 36 MMOL/L (ref 21–32)
CREAT SERPL-MCNC: 0.99 MG/DL (ref 0.55–1.02)
CREAT SERPL-MCNC: 1.08 MG/DL (ref 0.55–1.02)
CREAT SERPL-MCNC: 1.19 MG/DL (ref 0.55–1.02)
DIAGNOSIS, 93000: NORMAL
DIAGNOSIS, 93000: NORMAL
ERYTHROCYTE [DISTWIDTH] IN BLOOD BY AUTOMATED COUNT: 20.6 % (ref 11.5–14.5)
GAMMA GLOB SERPL ELPH-MCNC: 2 G/DL (ref 0.4–1.8)
GLOBULIN SER-MCNC: 4.5 G/DL (ref 2.2–3.9)
GLUCOSE BLD STRIP.AUTO-MCNC: 122 MG/DL (ref 65–117)
GLUCOSE BLD STRIP.AUTO-MCNC: 141 MG/DL (ref 65–117)
GLUCOSE BLD STRIP.AUTO-MCNC: 142 MG/DL (ref 65–117)
GLUCOSE SERPL-MCNC: 120 MG/DL (ref 65–100)
GLUCOSE SERPL-MCNC: 141 MG/DL (ref 65–100)
GLUCOSE SERPL-MCNC: 161 MG/DL (ref 65–100)
H PYLORI IGA SER-ACNC: 19.5 UNITS (ref 0–8.9)
H PYLORI IGG SER IA-ACNC: 0.37 INDEX VALUE (ref 0–0.79)
H PYLORI IGM SER-ACNC: <9 UNITS (ref 0–8.9)
HCT VFR BLD AUTO: 23.6 % (ref 35–47)
HGB BLD-MCNC: 7 G/DL (ref 11.5–16)
HISTORY CHECKED?,CKHIST: NORMAL
IGA SERPL-MCNC: 871 MG/DL (ref 87–352)
IGG SERPL-MCNC: 1944 MG/DL (ref 586–1602)
IGM SERPL-MCNC: 119 MG/DL (ref 26–217)
INTERPRETATION SERPL IEP-IMP: ABNORMAL
KAPPA LC FREE SER-MCNC: 184.8 MG/L (ref 3.3–19.4)
KAPPA LC FREE/LAMBDA FREE SER: 2.04 (ref 0.26–1.65)
LAMBDA LC FREE SERPL-MCNC: 90.7 MG/L (ref 5.7–26.3)
M PROTEIN SERPL ELPH-MCNC: ABNORMAL G/DL
MAGNESIUM SERPL-MCNC: 2.2 MG/DL (ref 1.6–2.4)
MAGNESIUM SERPL-MCNC: 2.4 MG/DL (ref 1.6–2.4)
MCH RBC QN AUTO: 29.8 PG (ref 26–34)
MCHC RBC AUTO-ENTMCNC: 29.7 G/DL (ref 30–36.5)
MCV RBC AUTO: 100.4 FL (ref 80–99)
NRBC # BLD: 0.09 K/UL (ref 0–0.01)
NRBC BLD-RTO: 0.7 PER 100 WBC
P-R INTERVAL, ECG05: 162 MS
P-R INTERVAL, ECG05: 184 MS
PHOSPHATE SERPL-MCNC: 1.9 MG/DL (ref 2.6–4.7)
PHOSPHATE SERPL-MCNC: 2.6 MG/DL (ref 2.6–4.7)
PLATELET # BLD AUTO: 167 K/UL (ref 150–400)
PMV BLD AUTO: 11 FL (ref 8.9–12.9)
POTASSIUM SERPL-SCNC: 3.2 MMOL/L (ref 3.5–5.1)
POTASSIUM SERPL-SCNC: 3.4 MMOL/L (ref 3.5–5.1)
POTASSIUM SERPL-SCNC: 4.3 MMOL/L (ref 3.5–5.1)
PROT SERPL-MCNC: 6.8 G/DL (ref 6–8.5)
Q-T INTERVAL, ECG07: 540 MS
Q-T INTERVAL, ECG07: 566 MS
QRS DURATION, ECG06: 80 MS
QRS DURATION, ECG06: 82 MS
QTC CALCULATION (BEZET), ECG08: 630 MS
QTC CALCULATION (BEZET), ECG08: 661 MS
RBC # BLD AUTO: 2.35 M/UL (ref 3.8–5.2)
SERVICE CMNT-IMP: ABNORMAL
SODIUM SERPL-SCNC: 136 MMOL/L (ref 136–145)
SODIUM SERPL-SCNC: 136 MMOL/L (ref 136–145)
SODIUM SERPL-SCNC: 137 MMOL/L (ref 136–145)
VENTRICULAR RATE, ECG03: 82 BPM
VENTRICULAR RATE, ECG03: 82 BPM
WBC # BLD AUTO: 13.6 K/UL (ref 3.6–11)

## 2023-01-30 PROCEDURE — 72193 CT PELVIS W/DYE: CPT

## 2023-01-30 PROCEDURE — 74011250637 HC RX REV CODE- 250/637: Performed by: STUDENT IN AN ORGANIZED HEALTH CARE EDUCATION/TRAINING PROGRAM

## 2023-01-30 PROCEDURE — 74011250636 HC RX REV CODE- 250/636: Performed by: INTERNAL MEDICINE

## 2023-01-30 PROCEDURE — 74011000250 HC RX REV CODE- 250: Performed by: NURSE PRACTITIONER

## 2023-01-30 PROCEDURE — 80048 BASIC METABOLIC PNL TOTAL CA: CPT

## 2023-01-30 PROCEDURE — 74011250636 HC RX REV CODE- 250/636: Performed by: HOSPITALIST

## 2023-01-30 PROCEDURE — P9016 RBC LEUKOCYTES REDUCED: HCPCS

## 2023-01-30 PROCEDURE — 74011000258 HC RX REV CODE- 258: Performed by: NURSE PRACTITIONER

## 2023-01-30 PROCEDURE — 36415 COLL VENOUS BLD VENIPUNCTURE: CPT

## 2023-01-30 PROCEDURE — 83735 ASSAY OF MAGNESIUM: CPT

## 2023-01-30 PROCEDURE — 85027 COMPLETE CBC AUTOMATED: CPT

## 2023-01-30 PROCEDURE — 74011250637 HC RX REV CODE- 250/637: Performed by: INTERNAL MEDICINE

## 2023-01-30 PROCEDURE — 86900 BLOOD TYPING SEROLOGIC ABO: CPT

## 2023-01-30 PROCEDURE — 36430 TRANSFUSION BLD/BLD COMPNT: CPT

## 2023-01-30 PROCEDURE — 77010033678 HC OXYGEN DAILY

## 2023-01-30 PROCEDURE — 74011000250 HC RX REV CODE- 250: Performed by: INTERNAL MEDICINE

## 2023-01-30 PROCEDURE — 74011250636 HC RX REV CODE- 250/636: Performed by: NURSE PRACTITIONER

## 2023-01-30 PROCEDURE — 65610000006 HC RM INTENSIVE CARE

## 2023-01-30 PROCEDURE — 74011000636 HC RX REV CODE- 636: Performed by: INTERNAL MEDICINE

## 2023-01-30 PROCEDURE — 74011000258 HC RX REV CODE- 258: Performed by: INTERNAL MEDICINE

## 2023-01-30 PROCEDURE — 99223 1ST HOSP IP/OBS HIGH 75: CPT | Performed by: INTERNAL MEDICINE

## 2023-01-30 PROCEDURE — 94660 CPAP INITIATION&MGMT: CPT

## 2023-01-30 PROCEDURE — 84100 ASSAY OF PHOSPHORUS: CPT

## 2023-01-30 PROCEDURE — 86923 COMPATIBILITY TEST ELECTRIC: CPT

## 2023-01-30 PROCEDURE — 74011000258 HC RX REV CODE- 258: Performed by: HOSPITALIST

## 2023-01-30 PROCEDURE — 74011000250 HC RX REV CODE- 250: Performed by: STUDENT IN AN ORGANIZED HEALTH CARE EDUCATION/TRAINING PROGRAM

## 2023-01-30 PROCEDURE — 82962 GLUCOSE BLOOD TEST: CPT

## 2023-01-30 RX ORDER — SODIUM CHLORIDE 9 MG/ML
250 INJECTION, SOLUTION INTRAVENOUS AS NEEDED
Status: DISCONTINUED | OUTPATIENT
Start: 2023-01-30 | End: 2023-02-06

## 2023-01-30 RX ORDER — POTASSIUM CHLORIDE 7.45 MG/ML
10 INJECTION INTRAVENOUS
Status: COMPLETED | OUTPATIENT
Start: 2023-01-30 | End: 2023-01-30

## 2023-01-30 RX ORDER — DIPHENHYDRAMINE HYDROCHLORIDE 50 MG/ML
25 INJECTION, SOLUTION INTRAMUSCULAR; INTRAVENOUS ONCE
Status: COMPLETED | OUTPATIENT
Start: 2023-01-30 | End: 2023-01-30

## 2023-01-30 RX ORDER — NOREPINEPHRINE BITARTRATE/D5W 8 MG/250ML
.5-2 PLASTIC BAG, INJECTION (ML) INTRAVENOUS
Status: DISPENSED | OUTPATIENT
Start: 2023-01-30 | End: 2023-01-30

## 2023-01-30 RX ADMIN — SODIUM CHLORIDE, PRESERVATIVE FREE 10 ML: 5 INJECTION INTRAVENOUS at 21:30

## 2023-01-30 RX ADMIN — PIPERACILLIN AND TAZOBACTAM 3.38 G: 3; .375 INJECTION, POWDER, FOR SOLUTION INTRAVENOUS at 01:43

## 2023-01-30 RX ADMIN — FOLIC ACID 1 MG: 1 TABLET ORAL at 08:15

## 2023-01-30 RX ADMIN — POTASSIUM CHLORIDE 10 MEQ: 7.46 INJECTION, SOLUTION INTRAVENOUS at 15:26

## 2023-01-30 RX ADMIN — PIPERACILLIN AND TAZOBACTAM 3.38 G: 3; .375 INJECTION, POWDER, FOR SOLUTION INTRAVENOUS at 18:05

## 2023-01-30 RX ADMIN — POTASSIUM CHLORIDE 10 MEQ: 10 INJECTION, SOLUTION INTRAVENOUS at 00:00

## 2023-01-30 RX ADMIN — POTASSIUM PHOSPHATE, MONOBASIC AND POTASSIUM PHOSPHATE, DIBASIC: 224; 236 INJECTION, SOLUTION, CONCENTRATE INTRAVENOUS at 04:44

## 2023-01-30 RX ADMIN — Medication 1 AMPULE: at 21:29

## 2023-01-30 RX ADMIN — POTASSIUM CHLORIDE 10 MEQ: 7.46 INJECTION, SOLUTION INTRAVENOUS at 14:05

## 2023-01-30 RX ADMIN — CASTOR OIL AND BALSAM, PERU: 788; 87 OINTMENT TOPICAL at 21:29

## 2023-01-30 RX ADMIN — MIDODRINE HYDROCHLORIDE 10 MG: 5 TABLET ORAL at 16:32

## 2023-01-30 RX ADMIN — SODIUM CHLORIDE, PRESERVATIVE FREE 10 ML: 5 INJECTION INTRAVENOUS at 13:10

## 2023-01-30 RX ADMIN — Medication 1 AMPULE: at 08:11

## 2023-01-30 RX ADMIN — MIDODRINE HYDROCHLORIDE 10 MG: 5 TABLET ORAL at 08:15

## 2023-01-30 RX ADMIN — CASTOR OIL AND BALSAM, PERU: 788; 87 OINTMENT TOPICAL at 08:11

## 2023-01-30 RX ADMIN — POTASSIUM CHLORIDE 10 MEQ: 7.46 INJECTION, SOLUTION INTRAVENOUS at 13:09

## 2023-01-30 RX ADMIN — POTASSIUM CHLORIDE 10 MEQ: 7.46 INJECTION, SOLUTION INTRAVENOUS at 02:57

## 2023-01-30 RX ADMIN — SODIUM CHLORIDE, PRESERVATIVE FREE 10 ML: 5 INJECTION INTRAVENOUS at 06:02

## 2023-01-30 RX ADMIN — POTASSIUM BICARBONATE 40 MEQ: 782 TABLET, EFFERVESCENT ORAL at 16:32

## 2023-01-30 RX ADMIN — MIDODRINE HYDROCHLORIDE 10 MG: 5 TABLET ORAL at 11:36

## 2023-01-30 RX ADMIN — PANTOPRAZOLE SODIUM 40 MG: 40 TABLET, DELAYED RELEASE ORAL at 08:15

## 2023-01-30 RX ADMIN — PIPERACILLIN AND TAZOBACTAM 3.38 G: 3; .375 INJECTION, POWDER, FOR SOLUTION INTRAVENOUS at 10:04

## 2023-01-30 RX ADMIN — PANTOPRAZOLE SODIUM 40 MG: 40 TABLET, DELAYED RELEASE ORAL at 16:30

## 2023-01-30 RX ADMIN — NOREPINEPHRINE BITARTRATE 6 MCG/MIN: 1 INJECTION, SOLUTION, CONCENTRATE INTRAVENOUS at 11:34

## 2023-01-30 RX ADMIN — IOPAMIDOL 100 ML: 755 INJECTION, SOLUTION INTRAVENOUS at 17:30

## 2023-01-30 RX ADMIN — POTASSIUM BICARBONATE 40 MEQ: 782 TABLET, EFFERVESCENT ORAL at 13:09

## 2023-01-30 RX ADMIN — POTASSIUM CHLORIDE 10 MEQ: 7.46 INJECTION, SOLUTION INTRAVENOUS at 03:00

## 2023-01-30 RX ADMIN — ATORVASTATIN CALCIUM 10 MG: 10 TABLET, FILM COATED ORAL at 21:29

## 2023-01-30 RX ADMIN — DIPHENHYDRAMINE HYDROCHLORIDE 25 MG: 50 INJECTION, SOLUTION INTRAMUSCULAR; INTRAVENOUS at 22:08

## 2023-01-30 RX ADMIN — NOREPINEPHRINE BITARTRATE 12 MCG/MIN: 1 INJECTION, SOLUTION, CONCENTRATE INTRAVENOUS at 06:20

## 2023-01-30 NOTE — PROGRESS NOTES
Participated in CCU IDR where pt was discussed.   Sherin Wills M.Div, [facility] Paging Service [phone])

## 2023-01-30 NOTE — PROGRESS NOTES
Critical Care Progress Note  Sable Ganser, MD          Date of Service:  2023  NAME:  David Prajapati  :  1960  MRN:  [id number]      Subjective/Hospital course: Liza Wilde is a 60 y.o.  female with pertinent past medical history of chronic hypoxemic hypercapnic respiratory failure on 4 L/min home oxygen (daytime) and Trilogy at night, COPD, JOSLYN/OHS, diabetes mellitus, essential hypertension, hyperlipidemia,history duodenal ulcers, chronic anemia of indeterminate etiology who was admitted  with hemoglobin of 5.3. At the time, CXR demonstrates diffuse hazy interstitial opacities suggestive of edema with bandlike right upper lobe atelectasis. Patient was transfused with PRBC. GI consulted. GI recommends hematology evaluation. Deferred EGD at this time as patient doesn't have signs of overt bleeding and fecal occult blood test was negative. Per notes patient began complaining of increased shortness of breath on  and had desaturation with exertion (baseline 4LNC). She was given Lasix and CPAP. , Diamox was added for concern for \"CO2 is high\" . She was increased to [facility]. Overnight, she was increased to 10L then 15L early morning . ABG revealed 7.25/105/24 on 15L. She was placed on bipap and transferred to CCU as stepdown overflow.  - Remained on Bipap - avaps mode overnight. Mental status significantly improved. Mild hypotension yesterday, started on low dose NE  : on 3 mcg of epi  : Patient had an episode of V tach this morning. She is alert and oriented. No acute events overnight.  - No further arrhythmia issues. Weaning NE       Problem list:     Septic shock  Hypercarbic respiratory failure  MSSA bacteremia.   Left femur fx  Hypokalmia  V tach    Assessment/Plan:   PULMONARY:  Acute on chronic hypercapnic hypoxic respiratory failure  Flu covid negative   Appears she was getting CPAP on the floor not home Trilogy   Needs ventilatory support with Bipap or trilogy   Chronic metabolic alkalosis secondary to chronic respiratory acidosis  Base bicarb calc should be approx 40, with CO2 likely 70-80 per abg and metabolic panel         Plan  - avoid diamox   - Bipap adjusted to AVAPs with 7cc/kg   Transition to NC during day   Continue NIPPV with naps and nightly   - close monitoring of mental status   - Continue nebulized bronchodilators  - Supplemental O2 to maintain SpO2 90-92              Avoid hyperoxia   -Holding diuresis now due to severe hypokalemia. CARDIOVASCULAR/HEMODYNAMIC:    V tach. Likely from severe hypokalemia. Keep mag >2 and K >4. ECHO benign but poor windows  If persistently positive blood cx, will need to discuss YANNA but high risk at this point. Would need to be intubated for that. Current vasopressors: low dose NE      Plan  - ICU hemodynamic/cardiac monitoring  - MAP goal > 65 mmHg  - hold lasix due to hypotension and severe hypokalemia. RENAL:  hypokalemia     Plan  - Monitor I/Os and Uo  - Monitor renal function panel intermittently  - Correct electrolytes as needed  - Avoid nephrotoxic meds     GI/NUTRITION:  Obesity   Risk for malnutrition      Plan  - advance diet as tolerated to cardiac diet     INFECTIOUS DISEASE  Bacteremia  Blood culture 1/1 + MSSA 1/21  Blood culture 1/2 + GPC 1/25 1/27 - NGTD  1/29 - NGTD     Plan  - continue nafcillin  - repeat blood cultures daily until clear  - trend procal  - consult ID for longterm abx   - likely need PICC line when cx negative         HEME  Chronic anemia  Slowly trending down    Hematology / GI consulted on floor   GI signed off  Non invasive pill cam?        Plan  - appreciate heme recs   - Daily CBC  - Transfuse as needed to maintain Hgb > 7.0 gm/dL or for hemodynamically significant bleeding  - no plans for GI intervention.    - continue protonix  - hold home xarelto  - hold lovenox prophyl     NEURO  CO2 narcosis - improved   Bed bound at baseline- PT signed off      Plan  - ABCDEF mobility bundle         ENDO  No acute issues        Other  - complaining of hip / back pain   Xray hip 1/26 with femoral neck fracture   Ortho consulted - not a surgical candidate    - right big toe infection s/p nail avulsion   wound care    Code status: 1/29 -   Addendum:  -Patient is alert and oriented x4. She wanted to changed her code status to DNR and did not want intubation at all. I asked her to inform her family of her decision so that they are on the same page. She then wanted me to call her daughter and update her about DNR. I called her daughter and gave her clinical update and also informed about DNR. Also changed her code status to DNR. Andreina Martin MD     I personally spent 45 minutes of critical care time. This is time spent at this critically ill patient's bedside actively involved in patient care as well as the coordination of care and discussions with the patient's family. This does not include any procedural time which has been billed separately. Review of Systems:   ROS   Back pain    Vital Signs:   Patient Vitals for the past 4 hrs:   BP Temp Pulse Resp SpO2 Weight   01/30/23 0815 (!) 124/49 -- 77 -- -- --   01/30/23 0800 (!) 134/50 98.5 °F (36.9 °C) 71 22 95 % --   01/30/23 0700 (!) 119/47 -- 74 24 93 % --   01/30/23 0600 -- -- -- -- -- 132 kg (291 lb 0.1 oz)          Intake/Output Summary (Last 24 hours) at 1/30/2023 3318  Last data filed at 1/30/2023 0800  Gross per 24 hour   Intake 944.25 ml   Output 1000 ml   Net -55.75 ml          Physical Examination:    Physical Exam  Morbidly obese  HEENT: normal  Heart: s1,s2  Lungs : clear  Abd: soft  Ext: left femur fx  Right 1+ edema  Cns: AAO x4  Labs and Imaging:   Reviewed.       Medications:     Current Facility-Administered Medications   Medication Dose Route Frequency    potassium phosphate 15 mmol in 0.9% sodium chloride 250 mL infusion   IntraVENous ONCE    NOREPINephrine (LEVOPHED) 8 mg in 5% dextrose 250mL (32 mcg/mL) infusion  0.5-20 mcg/min IntraVENous TITRATE    piperacillin-tazobactam (ZOSYN) 3.375 g in 0.9% sodium chloride (MBP/ADV) 100 mL MBP  3.375 g IntraVENous Q8H    midodrine (PROAMATINE) tablet 10 mg  10 mg Oral TID WITH MEALS    enoxaparin (LOVENOX) injection 30 mg  30 mg SubCUTAneous Q12H    alcohol 62% (NOZIN) nasal  1 Ampule  1 Ampule Topical Q12H    insulin lispro (HUMALOG) injection   SubCUTAneous Q6H    glucose chewable tablet 16 g  4 Tablet Oral PRN    glucagon (GLUCAGEN) injection 1 mg  1 mg IntraMUSCular PRN    [Held by provider] furosemide (LASIX) injection 20 mg  20 mg IntraVENous BID    [Held by provider] metoprolol tartrate (LOPRESSOR) tablet 12.5 mg  12.5 mg Oral BID    folic acid (FOLVITE) tablet 1 mg  1 mg Oral DAILY    albuterol-ipratropium (DUO-NEB) 2.5 MG-0.5 MG/3 ML  3 mL Nebulization Q4H PRN    atorvastatin (LIPITOR) tablet 10 mg  10 mg Oral QHS    pantoprazole (PROTONIX) tablet 40 mg  40 mg Oral ACB&D    sodium chloride (NS) flush 5-40 mL  5-40 mL IntraVENous Q8H    sodium chloride (NS) flush 5-40 mL  5-40 mL IntraVENous PRN    acetaminophen (TYLENOL) tablet 650 mg  650 mg Oral Q6H PRN    Or    acetaminophen (TYLENOL) suppository 650 mg  650 mg Rectal Q6H PRN    polyethylene glycol (MIRALAX) packet 17 g  17 g Oral DAILY PRN    ondansetron (ZOFRAN ODT) tablet 4 mg  4 mg Oral Q8H PRN    Or    ondansetron (ZOFRAN) injection 4 mg  4 mg IntraVENous Q6H PRN    dextrose 10% infusion 0-250 mL  0-250 mL IntraVENous PRN    balsam peru-castor oiL (VENELEX) ointment   Topical BID     ______________________________________________________________________  EXPECTED LENGTH OF STAY: 2d 16h  ACTUAL LENGTH OF STAY:          8                 Bernie Salazar MD   Pulmonary/CCM  [phone]

## 2023-01-30 NOTE — PROGRESS NOTES
Hematology Oncology Progress Note    Follow up for: anemia    Chart notes reviewed since last visit. Case discussed with following: Nurse, patient, pharmacist    Patient complains of the following: still in ICU on Levophed at 10, weaning down. No bleeding reported per nurse/pt.     Additional concerns noted by the staff:  Nurse, Lena Cosme    Patient Vitals for the past 24 hrs:   BP Temp Pulse Resp SpO2 Weight   01/30/23 0900 (!) 136/53 -- 88 22 98 % --   01/30/23 0815 (!) 124/49 -- 77 -- -- --   01/30/23 0800 (!) 134/50 98.5 °F (36.9 °C) 71 22 95 % --   01/30/23 0700 (!) 119/47 -- 74 24 93 % --   01/30/23 0600 -- -- -- -- -- 132 kg (291 lb 0.1 oz)   01/30/23 0500 (!) 116/55 -- 71 23 94 % --   01/30/23 0400 90/72 99.3 °F (37.4 °C) 74 19 97 % --   01/30/23 0354 -- -- -- -- 96 % --   01/30/23 0300 (!) 127/59 -- 76 25 96 % --   01/30/23 0200 (!) 114/51 -- 73 24 96 % --   01/30/23 0100 102/81 -- 81 -- (!) 84 % --   01/30/23 0000 (!) 130/57 99.4 °F (37.4 °C) 74 25 94 % --   01/29/23 2339 -- -- -- -- 95 % --   01/29/23 2300 (!) 135/59 -- 73 24 94 % --   01/29/23 2200 135/61 -- 73 21 94 % --   01/29/23 2137 -- -- -- -- 95 % --   01/29/23 2100 (!) 144/65 -- 73 (!) 33 94 % --   01/29/23 2000 (!) 128/51 98.7 °F (37.1 °C) 74 25 93 % --   01/29/23 1900 136/64 -- 74 28 93 % --   01/29/23 1815 (!) 140/66 -- 73 (!) 31 93 % --   01/29/23 1800 135/69 -- 75 (!) 32 94 % --   01/29/23 1730 (!) 118/44 -- 75 27 93 % --   01/29/23 1715 (!) 124/43 -- 72 29 91 % --   01/29/23 1700 (!) 128/44 -- 71 30 92 % --   01/29/23 1645 (!) 130/47 -- 73 (!) 31 93 % --   01/29/23 1630 (!) 132/50 -- 75 28 94 % --   01/29/23 1615 (!) 125/48 -- 73 30 94 % --   01/29/23 1600 (!) 138/48 99.2 °F (37.3 °C) 73 30 93 % --   01/29/23 1545 (!) 137/44 -- 71 30 94 % --   01/29/23 1530 (!) 139/46 -- 71 26 94 % --   01/29/23 1518 -- -- -- -- 94 % --   01/29/23 1515 (!) 131/49 -- 71 29 94 % --   01/29/23 1500 (!) 128/57 -- 72 27 94 % --   01/29/23 1445 (!) 128/49 -- 71 (!) 34 94 % --   01/29/23 1430 (!) 126/46 -- 72 (!) 33 94 % --   01/29/23 1330 (!) 114/39 -- 71 22 94 % --   01/29/23 1300 (!) 136/48 -- 73 26 94 % --   01/29/23 1219 -- -- -- -- 93 % --   01/29/23 1216 -- -- -- -- (!) 82 % --   01/29/23 1215 -- 99.2 °F (37.3 °C) -- -- (!) 84 % --   01/29/23 1200 (!) 117/49 -- 74 26 95 % --   01/29/23 1115 -- -- -- -- 93 % --   01/29/23 1100 (!) 130/47 -- 70 20 93 % --       Physical Examination:  Gen some distress  Resp some distress on BIPAP      Labs:  Recent Results (from the past 24 hour(s))   GLUCOSE, POC    Collection Time: 01/29/23 12:00 PM   Result Value Ref Range    Glucose (POC) 167 (H) 65 - 117 mg/dL    Performed by [address], BASIC    Collection Time: 01/29/23 12:38 PM   Result Value Ref Range    Sodium 137 136 - 145 mmol/L    Potassium 2.9 (L) 3.5 - 5.1 mmol/L    Chloride 93 (L) 97 - 108 mmol/L    CO2 35 (H) 21 - 32 mmol/L    Anion gap 9 5 - 15 mmol/L    Glucose 169 (H) 65 - 100 mg/dL    BUN 29 (H) 6 - 20 MG/DL    Creatinine 1.32 (H) 0.55 - 1.02 MG/DL    BUN/Creatinine ratio 22 (H) 12 - 20      eGFR 45 (L) >60 ml/min/1.73m2    Calcium 7.4 (L) 8.5 - 10.1 MG/DL   CULTURE, BLOOD, PERIPHERAL    Collection Time: 01/29/23 12:38 PM    Specimen: Blood   Result Value Ref Range    Special Requests: NO SPECIAL REQUESTS      Culture result: NO GROWTH AFTER 17 HOURS     METABOLIC PANEL, BASIC    Collection Time: 01/29/23  4:48 PM   Result Value Ref Range    Sodium 138 136 - 145 mmol/L    Potassium 2.6 (LL) 3.5 - 5.1 mmol/L    Chloride 93 (L) 97 - 108 mmol/L    CO2 37 (H) 21 - 32 mmol/L    Anion gap 8 5 - 15 mmol/L    Glucose 166 (H) 65 - 100 mg/dL    BUN 28 (H) 6 - 20 MG/DL    Creatinine 1.30 (H) 0.55 - 1.02 MG/DL    BUN/Creatinine ratio 22 (H) 12 - 20      eGFR 46 (L) >60 ml/min/1.73m2    Calcium 7.6 (L) 8.5 - 10.1 MG/DL   MAGNESIUM    Collection Time: 01/29/23  4:48 PM   Result Value Ref Range    Magnesium 2.1 1.6 - 2.4 mg/dL   PHOSPHORUS    Collection Time: 01/29/23  4:48 PM   Result Value Ref Range    Phosphorus 2.7 2.6 - 4.7 MG/DL   TROPONIN-HIGH SENSITIVITY    Collection Time: 01/29/23  4:48 PM   Result Value Ref Range    Troponin-High Sensitivity 28 0 - 51 ng/L   GLUCOSE, POC    Collection Time: 01/29/23  5:37 PM   Result Value Ref Range    Glucose (POC) 154 (H) 65 - 117 mg/dL    Performed by Mychal Fleming, POC    Collection Time: 01/29/23 11:30 PM   Result Value Ref Range    Glucose (POC) 148 (H) 65 - 117 mg/dL    Performed by Lakshmi Hilliard RN    METABOLIC PANEL, BASIC    Collection Time: 01/30/23  1:38 AM   Result Value Ref Range    Sodium 136 136 - 145 mmol/L    Potassium 3.4 (L) 3.5 - 5.1 mmol/L    Chloride 95 (L) 97 - 108 mmol/L    CO2 36 (H) 21 - 32 mmol/L    Anion gap 5 5 - 15 mmol/L    Glucose 141 (H) 65 - 100 mg/dL    BUN 24 (H) 6 - 20 MG/DL    Creatinine 1.19 (H) 0.55 - 1.02 MG/DL    BUN/Creatinine ratio 20 12 - 20      eGFR 51 (L) >60 ml/min/1.73m2    Calcium 7.6 (L) 8.5 - 10.1 MG/DL   MAGNESIUM    Collection Time: 01/30/23  1:38 AM   Result Value Ref Range    Magnesium 2.4 1.6 - 2.4 mg/dL   CBC W/O DIFF    Collection Time: 01/30/23  1:38 AM   Result Value Ref Range    WBC 13.6 (H) 3.6 - 11.0 K/uL    RBC 2.35 (L) 3.80 - 5.20 M/uL    HGB 7.0 (L) 11.5 - 16.0 g/dL    HCT 23.6 (L) 35.0 - 47.0 %    .4 (H) 80.0 - 99.0 FL    MCH 29.8 26.0 - 34.0 PG    MCHC 29.7 (L) 30.0 - 36.5 g/dL    RDW 20.6 (H) 11.5 - 14.5 %    PLATELET 716 874 - 613 K/uL    MPV 11.0 8.9 - 12.9 FL    NRBC 0.7 (H) 0  WBC    ABSOLUTE NRBC 0.09 (H) 0.00 - 0.01 K/uL   PHOSPHORUS    Collection Time: 01/30/23  1:38 AM   Result Value Ref Range    Phosphorus 1.9 (L) 2.6 - 4.7 MG/DL   GLUCOSE, POC    Collection Time: 01/30/23  5:44 AM   Result Value Ref Range    Glucose (POC) 142 (H) 65 - 117 mg/dL    Performed by Lakshmi Hilliard RN        Assessment and Plan:   Anemia  -folate and iron deficiency, started on folic acid and IV iron given last week.  -transfuse for HBG < 7, getting transfusion today   -no sign of hemolysis  -GI following, needing to optimise resp status before any attempt at EGD/colonoscopy, no reports of GI bleeding here.       Resp distress  -improving, on NC  -plan per ICU doc        Sepsis  - On Lovophed, weaning down per nurse [place]

## 2023-01-30 NOTE — CONSULTS
Infectious Disease Consult    Date of Consultation:  January 30, 2023  Reason for Consultation:staph bacteremia, persistent shock  Referring Physician: Dr Kirstin Crawford  Date of Admission:1/21/23    Impression    MSSA bacteremia persistent  Blood cultures 1/21+ for MSSA 1/1-RA  Blood cultures 1/25+ for MSSA 2/4-L/hand  Blood cultures 1/26+ for MSSA 1/4- L/hand  Negative cultures 1/27, 1/29  Echocardiogram 1/25-negative for vegetation, poor study  Per report. Consider YANNA    Acute on chronic respiratory failure  On BiPAP, O2 by NC ICU team     S/p episode of V. Tach on 1/29   2ry to severe hypokalemia  Currently on low-dose pressors  Cardiology following      Chronic anemia  S/p Hb 5.3 on admission  Folate, iron deficiency  Hematology following. Left hip pain   X-ray -di splaced, rotated, left femoral neck fracture  CT of pelvis ordered. Report as follows:  Chronic nonunited left femoral neck fracture. Adjacent moderate hip joint  osteoarthritis with large complex joint effusion. Severe right hip joint osteophytosis with small complex joint effusion. L /femoral neck fracture, chronic  MRI of  hip -5/3/2022 +for left femoral neck displaced fracture with fluid in the fracture gap s.  ?  Healing fracture with possible superimposed osteomyelitis/osteonecrosis. S/p Fluid aspiration by IR on 5/5/22  Cultures-NG. No organisms on GS. Recommendation was for antibiotics iv. Patient was initially on on daptomycin and cefepime iv with plans for 6 weeks of therapy. Patient was readmitted in June2022 . Antibiotic therapy was completed with Zosyn IV.     R/  big toe infection s/p nail avulsion  Wound is clean  Daily wound care    Morbid obesity  BMI 51.55     Plan  Continue Zosyn IV for now  (As there is concern for infiltrates on CXR, also right toe wound & wider spectrum of coverage with  GNR and anaerobes required  Patient will require total of 6 weeks of MSSA therapy, will eventually be switched to nafcillin  IV  Repeat blood cultures x2  Respiratory cultures  Aspiration precautions  If poor visualization of valves on echo, recommend YANNA   Ortho to review CT pelvis-? Aspiration of fluid in left hip. Dayanara Chin is a 60 y.o.  female with PMH significant for chronic hypoxic respiratory failure on 4 L/min home oxygen therapy secondary to COPD, diabetes mellitus, essential hypertension, hyperlipidemia, JOSLYN compliant with CPAP, history duodenal ulcers, chronic anemia of indeterminate etiology who was admitted 1/21 with hemoglobin of 5.3. Patient is known to ID service from previous admission back in May and June /2022. Patient had chronic displaced left femoral  neck fracture. MRI of left hip showed left femoral neck displaced fracture with fluid in the fracture gap and edema/enhancement and surrounding marrow and soft tissues. Findings may reflect a healing fracture with possible superimposed osteomyelitis/osteonecrosis. S/p Fluid aspiration by IR on 5/5  Cultures-NG. No organisms on GS. No plans for Surgery per Ortho. Recommendation was for antibiotics iv. Patient was placed on daptomycin and cefepime   X 6 weeks with end date 6/16. She was readmitted in June and changed to Zosyn IV during that admission. Patient completed 6 weeks of antibiotic therapy  Patient was initially admitted to the hospitalist service on this admission. Thereafter admitted to ICU. Patient has been requiring progressively high concentrations of O2. Patient was placed on BiPAP and transferred to ICU. Patient had episode of hypotension on 1/26  And has been on pressors. Patient had episode of V. tach on 1/29. Severe hypokalemia was because of arrhythmia per cardiology  Blood cultures have been positive for MSSA. Blood cultures on 1/21+ for MSSA 1/1-RA blood cultures on 1/25+ for MSSA 2/4 left hand, 1/26 1/4. Negative cultures on 1/27, 1/29. Echocardiogram on 1/25 negative for vegetation. Poor image quality. Patient was on nafcillin IV. Changed to Zosyn IV on 1/ 29 due to concern for pulmonary infiltrates and persistent hypotension. ID service has been consulted. Patient seen today. Resting comfortably in ICU. On  6liters oxygen  Patient has persistent pain in left hip. D/w Dr Kandy Leyva. Active Hospital Problems    Diagnosis Date Noted    Symptomatic anemia 01/22/2023         Past Medical History:   Diagnosis Date    Acute on chronic respiratory failure with hypoxia and hypercapnia ([facility]) 11/20/2020    Atrial fibrillation ([facility] [address] 11/4/2020    CHF (congestive heart failure) ([facility])     Diabetes ([facility])     Hypertension     Ill-defined condition     high cholesterol    Ill-defined condition     tachycardia    JOSLYN (obstructive sleep apnea) 11/20/2020    Other and unspecified symptoms and signs involving general sensations and perceptions     ruptured disc    S/P cardiac cath 11/4/2020    11/3/2020 nonobstructive disease       Past Surgical History:   Procedure Laterality Date    MT COMPRE ELECTROPHYSIOL XM W/LEFT ATRIAL PACNG/REC N/A 11/23/2020    Lt Atrial Pace & Record During Ep Study performed by Sandoval Cobb MD at [facility] CARDIAC CATH LAB    MT COMPRE EP EVAL ABLTJ 3D MAPG TX SVT N/A 11/23/2020    ABLATION A-FLUTTER performed by Sandoval Cobb MD at [facility]. ADD ON N/A 11/23/2020    Ablation Svt/Vt Add On performed by Sandoval Cobb MD at [address] [place] [address] CARDIAC CATH LAB    MT INTRACARDIAC ELECTROPHYSIOLOGIC 3D MAPPING N/A 11/23/2020    Ep 3d Mapping performed by Sandoval Cobb MD at [address] [place] [address] CARDIAC CATH LAB    MT STIM/PACING HEART POST IV DRUG INFU N/A 11/23/2020    Drug Stimulation performed by Sandoval Cobb MD at [address] [place] [address] CARDIAC CATH LAB       Allergies   Allergen Reactions    Cefepime Rash     Tolerated zosyn 6/2022; tolerated nafcillin 1/2023    Cefazolin Rash    Other Plant, Animal, Environmental Rash     Ivory soap caused rash on hands.        Social Connections: Not on file       No family status information on file. Medication Documentation Review Audit       Reviewed by Ciarra Tapia RN (Registered Nurse) on 01/22/23 at 1007      Medication Sig Documenting Provider Last Dose Status Taking?   acetaminophen (TYLENOL) 325 mg tablet Take 650 mg by mouth every six (6) hours as needed for Pain. Provider, Historical  Active    albuterol-ipratropium (DUO-NEB) 2.5 mg-0.5 mg/3 ml nebu 3 mL by Nebulization route every four (4) hours as needed for Wheezing, Shortness of Breath or Respiratory Distress. Tammi Horne MD  Active    amiodarone (CORDARONE) 200 mg tablet Take  by mouth. Provider, Historical 1/21/2023 Active Yes   atorvastatin (LIPITOR) 10 mg tablet Take 1 Tablet by mouth nightly. START AFTER COMPLETING ANTIBIOTICS TREATMENT ON 6/17 Julien Ruano MD 1/21/2023 Active Yes   clotrimazole-betamethasone (LOTRISONE) topical cream Apply at affected area Kera Lua MD  Active    furosemide (LASIX) 20 mg tablet Take  by mouth daily. Provider, Historical 1/21/2023 Active Yes   metoprolol (LOPRESSOR) 25 mg tablet Take 25 mg by mouth two (2) times a day. OtherRuss MD 1/21/2023 Active Yes   pantoprazole (PROTONIX) 40 mg tablet Take 1 Tablet by mouth Daily (before breakfast). Patient not taking: Reported on 1/22/2023    Kera Lua MD Not Taking Active No   rivaroxaban (Xarelto) 20 mg tab tablet Take  by mouth daily. Provider, Historical 1/21/2023 Active Yes                      Review of Systems - Negative except those mentioned in H&P      PHYSICAL EXAM:  General:          Resting, arousable, cooperative, no acute distress    EENT:              EOMI. Anicteric sclerae. MMM  Resp:               CTA bilaterally, no wheezing or rales. No accessory muscle use  CV:                  Regular  rhythm,  No edema  GI:                   Soft, Non distended, Non tender.   +Bowel sounds  Neurologic:      Alert and oriented X 3, normal speech,   Psych: Good insight. Not anxious nor agitated  Skin:                No rashes. No jaundice.   Extremities   Tender over L/hip, mild warmth+ R/hip -nontender    MD Emily Weathers

## 2023-01-30 NOTE — PROGRESS NOTES
Comprehensive Nutrition Assessment    Type and Reason for Visit: Initial, RD nutrition re-screen/LOS    Nutrition Recommendations/Plan:   Continue diet and supplements as ordered  Please document % meals and supplements consumed in flowsheet I/O's under intake      Malnutrition Assessment:  Malnutrition Status: At risk for malnutrition (specify) (01/30/23 1443)    Context:  Acute illness     Findings of the 6 clinical characteristics of malnutrition:   Energy Intake:  75% or less of est energy req for 7 or more days  Weight Loss:  No significant weight loss     Body Fat Loss:  No significant body fat loss,     Muscle Mass Loss:  No significant muscle mass loss,    Fluid Accumulation:  No significant fluid accumulation,     Strength:  Not performed     Nutrition Assessment:  Pt admitted with symptomatic anemia. PMH: COPD, DM, HTN, CHF. Chart reviewed for LOS, case discussed during CCU rounds. Pt lying in bed awake and alert. She consumed mashed potatoes and milk from her tray but nothing else. Family reports she is not much for meat these days. Discussed the importance of protein intake and that dairy is a good source, she likes ensure high protein but prefers chocolate flavor. K 3.4 and phos 1.9, being repleted. Levo being titrated down. Encouraged family to bring in food pt likes as she is not eating much. Patient Vitals for the past 168 hrs:   % Diet Eaten   01/30/23 1004 51 - 75%   01/29/23 0914 26 - 50%   01/28/23 1200 0%   01/28/23 1015 1 - 25%            Nutrition Related Findings:    Meds: folvite, humalog, MagSO4, protonix, zosyn,efferK, Kphos, KCl; Drips: levo. BM 1/30 Wound Type: Deep tissue injury(L buttocks) , Moisture associate skin damage, Surgical incision    Current Nutrition Intake & Therapies:  Average Meal Intake: 26-50%  Average Supplement Intake: 51-75%  ADULT ORAL NUTRITION SUPPLEMENT Breakfast; Standard 4 oz  ADULT ORAL NUTRITION SUPPLEMENT Lunch;  Low Calorie/High Protein  ADULT ORAL NUTRITION SUPPLEMENT Dinner; Renal Supplement  ADULT DIET Dysphagia - Soft & Bite Sized; 4 carb choices (60 gm/meal); Likes peas, carrots, mashed potatoes, chocolate ensure high protein please    Anthropometric Measures:  Height: 5' 3\" (160 cm)  Ideal Body Weight (IBW): 115 lbs (52 kg)     Current Body Wt:  132 kg (291 lb 0.1 oz), 253 % IBW. Bed scale  Current BMI (kg/m2): 51.6                          BMI Category: Obese class 3 (BMI 40.0 or greater)    Estimated Daily Nutrient Needs:  Energy Requirements Based On: Formula  Weight Used for Energy Requirements: Current  Energy (kcal/day): MSJ 2000 (1845 x 1.1)  Weight Used for Protein Requirements: Ideal  Protein (g/day): 78-105g (1.5-2gPro/kg IBW)  Method Used for Fluid Requirements: Other (comment)  Fluid (ml/day): per MD    Nutrition Diagnosis:   Inadequate protein-energy intake related to other (specify) (poor appetite) as evidenced by intake 26-50%    Nutrition Interventions:   Food and/or Nutrient Delivery: Continue current diet, Continue oral nutrition supplement  Nutrition Education/Counseling: No recommendations at this time  Coordination of Nutrition Care: Continue to monitor while inpatient, Interdisciplinary rounds       Goals:     Goals: PO intake 50% or greater, by next RD assessment       Nutrition Monitoring and Evaluation:   Behavioral-Environmental Outcomes: None identified  Food/Nutrient Intake Outcomes: Food and nutrient intake, Supplement intake  Physical Signs/Symptoms Outcomes: Biochemical data, Nutrition focused physical findings, Skin, Weight, Fluid status or edema, Hemodynamic status    Discharge Planning:     Too soon to determine    Earnestine Chavez RD, [facility]  Contact: ext [phone]

## 2023-01-30 NOTE — PROGRESS NOTES
Spiritual Care Assessment/Progress Note  [facility]      NAME: Rosa Brandon      MRN: [id number]  AGE: 60 y.o.  SEX: female  Shinto Affiliation: Kassie Lozano   Language: English     1/30/2023     Total Time (in minutes): 10     Spiritual Assessment begun in [facility] CRITICAL CARE 3 through conversation with:         [x]Patient        [] Family    [] Friend(s)        Reason for Consult: Palliative Care, Initial/Spiritual Assessment     Spiritual beliefs: (Please include comment if needed)     [x] Identifies with a rosalina tradition:         [] Supported by a rosalina community:            [] Claims no spiritual orientation:           [] Seeking spiritual identity:                [] Adheres to an individual form of spirituality:           [] Not able to assess:                           Identified resources for coping:      [] Prayer                               [] Music                  [] Guided Imagery     [x] Family/friends                 [] Pet visits     [] Devotional reading                         [] Unknown     [] Other:                                            Interventions offered during this visit: (See comments for more details)    Patient Interventions: Initial/Spiritual assessment, Critical care, Affirmation of emotions/emotional suffering, Iconic (affirming the presence of God/Higher Power), Coping skills reviewed/reinforced, Prayer (actual)           Plan of Care:     [] Support spiritual and/or cultural needs    [] Support AMD and/or advance care planning process      [] Support grieving process   [] Coordinate Rites and/or Rituals    [] Coordination with community clergy   [] No spiritual needs identified at this time   [] Detailed Plan of Care below (See Comments)  [] Make referral to Music Therapy  [] Make referral to Pet Therapy     [] Make referral to Addiction services  [] Make referral to [facility]  [] Make referral to Spiritual Care Partner  [] No future visits requested        [x] Contact Spiritual Care for further referrals     Comments: Initial spiritual assessment with palliative pt in 2514. Pt appeared to be resting but awoke to 's voice. Provided empathic touch and acknowledged pt's feelings in response to pt answering how she was feeling and coping. Pt supported by visits from daughter and grand-daughter. Pleased with care but expressed feeling tired. Extended word of comfort and encouragement along with prayer. Advised of availability of chaplains. Contact Spiritual Care for any further referrals.  Oral Dunne M.Div, [facility]   Paging Service [phone])

## 2023-01-30 NOTE — CONSULTS
[facility]  [address]  [phone]  WWW. [employer]       CARDIOLOGY CONSULTATION       Date of  Admission: 1/21/2023  7:27 PM     Admission type:Emergency   Primary Care Physician:Unknown, Provider     Attending Provider: Tavia Moran MD  Cardiology Provider:     PLEASE NOTE THAT WE CONFIRMED WITH THE REFERRING PHYSICIAN PRIOR TO SEEING THE PATIENT THAT THE PATIENT IS BEING [facility] EVALUATION AND FOR LONG-TERM ONGOING CARDIAC CARE    CC/REASON FOR CONSULT: Ventricular fibrillation     Subjective: Elda Luna is a 60 y.o. female admitted for Symptomatic anemia [D64.9]. Patient is a 48-year-old female history of COPD, type 2 diabetes, hypertension, hyperlipidemia, obstructive sleep apnea, chronic anemia and additional past medical history as reported below. She has been admitted for anemia hemoglobin 5.3.     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    Sciatica 04/21/2022    Hypoxia 04/21/2022    COPD exacerbation ([address] 04/21/2022    CHF exacerbation ([address] 04/21/2022    SOB (shortness of breath) 12/04/2020    Physical deconditioning 12/04/2020    Counseling regarding advance care planning and goals of care 12/04/2020    Atrial flutter with rapid ventricular response ([address] 11/20/2020    JOSLYN (obstructive sleep apnea) 11/20/2020    Acute on chronic respiratory failure with hypoxia and hypercapnia ([address] 11/20/2020    Acute respiratory failure ([address] 11/18/2020    Mixed hyperlipidemia 11/15/2020    Morbid obesity ([address] 11/15/2020    A-fib ([address] 11/04/2020    S/P cardiac cath 11/04/2020 COVID-19 ruled out 07/24/2020      Unknown, Provider  Past Medical History:   Diagnosis Date    Acute on chronic respiratory failure with hypoxia and hypercapnia ([facility] 11/20/2020    Atrial fibrillation ([facility] 11/4/2020    CHF (congestive heart failure) ([facility]     Diabetes ([facility]     Hypertension     Ill-defined condition     high cholesterol    Ill-defined condition     tachycardia    JOSLYN (obstructive sleep apnea) 11/20/2020    Other and unspecified symptoms and signs involving general sensations and perceptions     ruptured disc    S/P cardiac cath 11/4/2020    11/3/2020 nonobstructive disease      Social History     Socioeconomic History    Marital status: SINGLE   Tobacco Use    Smoking status: Former    Smokeless tobacco: Never   Substance and Sexual Activity    Alcohol use: No    Drug use: No     Allergies   Allergen Reactions    Cefepime Rash     Tolerated zosyn 6/2022; tolerated nafcillin 1/2023    Cefazolin Rash    Other Plant, Animal, Environmental Rash     Ivory soap caused rash on hands. No family history on file.    Current Facility-Administered Medications   Medication Dose Route Frequency    potassium phosphate 15 mmol in 0.9% sodium chloride 250 mL infusion   IntraVENous ONCE    NOREPINephrine (LEVOPHED) 8 mg in 5% dextrose 250mL (32 mcg/mL) infusion  0.5-20 mcg/min IntraVENous TITRATE    piperacillin-tazobactam (ZOSYN) 3.375 g in 0.9% sodium chloride (MBP/ADV) 100 mL MBP  3.375 g IntraVENous Q8H    midodrine (PROAMATINE) tablet 10 mg  10 mg Oral TID WITH MEALS    enoxaparin (LOVENOX) injection 30 mg  30 mg SubCUTAneous Q12H    alcohol 62% (NOZIN) nasal  1 Ampule  1 Ampule Topical Q12H    insulin lispro (HUMALOG) injection   SubCUTAneous Q6H    glucose chewable tablet 16 g  4 Tablet Oral PRN    glucagon (GLUCAGEN) injection 1 mg  1 mg IntraMUSCular PRN    [Held by provider] furosemide (LASIX) injection 20 mg  20 mg IntraVENous BID    [Held by provider] metoprolol tartrate (LOPRESSOR) tablet 12.5 mg  12.5 mg Oral BID    folic acid (FOLVITE) tablet 1 mg  1 mg Oral DAILY    albuterol-ipratropium (DUO-NEB) 2.5 MG-0.5 MG/3 ML  3 mL Nebulization Q4H PRN    atorvastatin (LIPITOR) tablet 10 mg  10 mg Oral QHS    pantoprazole (PROTONIX) tablet 40 mg  40 mg Oral ACB&D    sodium chloride (NS) flush 5-40 mL  5-40 mL IntraVENous Q8H    sodium chloride (NS) flush 5-40 mL  5-40 mL IntraVENous PRN    acetaminophen (TYLENOL) tablet 650 mg  650 mg Oral Q6H PRN    Or    acetaminophen (TYLENOL) suppository 650 mg  650 mg Rectal Q6H PRN    polyethylene glycol (MIRALAX) packet 17 g  17 g Oral DAILY PRN    ondansetron (ZOFRAN ODT) tablet 4 mg  4 mg Oral Q8H PRN    Or    ondansetron (ZOFRAN) injection 4 mg  4 mg IntraVENous Q6H PRN    dextrose 10% infusion 0-250 mL  0-250 mL IntraVENous PRN    balsam peru-castor oiL (VENELEX) ointment   Topical BID          REVIEW OF SYSTEMS  Review of Systems   Constitutional:  Positive for chills and malaise/fatigue. Negative for diaphoresis, fever and weight loss. Respiratory:  Negative for cough. Cardiovascular:  Positive for leg swelling. Negative for chest pain, palpitations, orthopnea, claudication and PND. Musculoskeletal:  Positive for joint pain and myalgias. Objective:      Visit Vitals  BP (!) 136/53   Pulse 88   Temp 98.5 °F (36.9 °C)   Resp 22   Ht 5' 3\" (1.6 m)   Wt 132 kg (291 lb 0.1 oz)   SpO2 98%   Breastfeeding No   BMI 51.55 kg/m²       Physical Exam:     Physical Exam  Constitutional:       Appearance: She is obese. She is ill-appearing. Cardiovascular:      Rate and Rhythm: Normal rate and regular rhythm. Heart sounds: No murmur heard. Pulmonary:      Effort: Pulmonary effort is normal. No respiratory distress. Breath sounds: No wheezing or rales. Musculoskeletal:      Right lower leg: Edema present. Left lower leg: Edema present. Neurological:      General: No focal deficit present. Mental Status: She is alert.         Data Review:   Recent Labs     01/30/23  0138 01/29/23  0332 01/28/23  0351   WBC 13.6* 17.1* 15.5*   HGB 7.0* 7.7* 8.0*   HCT 23.6* 25.8* 27.0*    220 236     Recent Labs     01/30/23  0138 01/29/23  1648 01/29/23  1238 01/29/23  0332 01/28/23  0351    138 137 136 138   K 3.4* 2.6* 2.9* 2.4* 2.8*   CL 95* 93* 93* 92* 91*   CO2 36* 37* 35* 37* 40*   * 166* 169* 161* 127*   BUN 24* 28* 29* 31* 40*   CREA 1.19* 1.30* 1.32* 1.28* 1.32*   CA 7.6* 7.6* 7.4* 7.7* 7.9*   MG 2.4 2.1  --  2.1  --    PHOS 1.9* 2.7  --  2.5* 2.4*   ALB  --   --   --  2.1* 1.9*   TBILI  --   --   --  6.0*  --    ALT  --   --   --  30  --      No results for input(s): CPK, CKMB, TROIQ in the last 72 hours. No lab exists for component: CKQMB, CPKMB, BMPP  No results for input(s): TROIQ, CPK, CKMB in the last 72 hours. Intake/Output Summary (Last 24 hours) at 1/30/2023 0932  Last data filed at 1/30/2023 0800  Gross per 24 hour   Intake 944.25 ml   Output 1000 ml   Net -55.75 ml        Cardiographics    Telemetry: NSR    ECG: Sinus no acute process     Echocardiogram:   Left Ventricle Normal left ventricular systolic function with a visually estimated EF of 60 - 65%. Left ventricle size is normal. Normal wall thickness. Normal wall motion. Normal diastolic function. Left Atrium Left atrium size is normal.   Right Ventricle Right ventricle size is normal. Normal systolic function. Right Atrium Right atrium size is normal.   Aortic Valve Valve structure is normal. No regurgitation. No stenosis. Mitral Valve Valve structure is normal. No regurgitation. No stenosis noted. Tricuspid Valve Valve structure is normal. No regurgitation. No stenosis noted. Pulmonic Valve Valve structure is normal. No regurgitation. No stenosis noted. Aorta Normal sized aortic root. Pericardium No pericardial effusion. CXRAY:No significant change in bibasilar and right perihilar airspace opacities as  above.        Assessment: Active Problems:    Symptomatic anemia (1/22/2023)         Plan:     Ventricular fibrillation: Event 1/29/2023 in setting of profound hypokalemia. Structurally normal heart on 2D echo. Recommendation keep potassium near or at 4.0, magnesium 2.0. No further recommendations from cardiology, will sign off. Please reconsult if any arrhythmia returns. 2.   Hx of Atrial Flutter: AFL ablation 11/2020. During the ablation patient had Refractory AF despite numerous cardioversion. We have not seen patient since ablation, she does not recall the name of the cardiologist following her. Appears to be on xarelto on home medications, with her anemia would recommend Watchman placement outpatient.    3.   AVNRT: Ablation performed     [facility]    CC:Unknown, Provider

## 2023-01-30 NOTE — PROGRESS NOTES
1930: Bedside shift change report given to Monica RN (oncoming nurse) by Agnes Judd RN (offgoing nurse). Report included the following information SBAR, Kardex, Intake/Output, MAR, Recent Results, and Cardiac Rhythm NSR .     2000: Shift assessment complete, see flowsheet for details. Patient is A&Ox4 and follows commands. Patient is NSR on the monitor. Patient's breath sounds are diminished. 0000: Reassessment complete, see flowsheet for details. No changes to previous assessment. 0100: Patient having multiple runs of v tach. NP Ander Miu notified, labs drawn early. 0400: Reassessment complete, see flowsheet for details. No changes to previous assessment. 0700: Bedside shift change report given to Ruby Baumann RN (oncoming nurse) by Vikram Garner RN (offgoing nurse). Report included the following information SBAR, Kardex, Intake/Output, MAR, Recent Results, and Cardiac Rhythm NSR .

## 2023-01-30 NOTE — PROGRESS NOTES
0700- Shift report received from [facility], RN Report included the following information SBAR, Kardex, ED Summary, Procedure Summary, Intake/Output, MAR, and Recent Results    0800- Shift assessment complete, pt resting comfort ably in bed. Alert and oriented x 4 and following commands appropriately. Currently Normal Sinus rhythm with stable BP, Levophed infusing at 12 mcg/min. See MAR and Flowsheets for more details. Power- Respiratory Therapist at bedside, pt transitioned off BiPAP to 5 L NC. PO meds tolerated well. 200- Interdisciplinary team rounds were held 1/30/2023 with the following team members:Care Management, Nursing, Nutrition, Palliative Care, and Pharmacy and the patient. Plan of care discussed. See clinical pathway and/or care plan for interventions and desired outcomes. Goals of the Day: 1u PRBC ordered, wean Levo    1200- Reassessment complete, see Flowsheets MAR and Flowsheets for more details. 1417- 1 unit PRBCs started. 1600- Reassessment complete, see MAR and Flowsheets for more details. 1717- Pt off the floor to CT with this RN, transport monitor, O2 tank, and Transport. 1741- Pt back in room.     1900- Shift report given to MARIE Anderson Report included the following information SBAR, Kardex, ED Summary, Procedure Summary, Intake/Output, MAR, and Recent Results

## 2023-01-30 NOTE — PROGRESS NOTES
Physician Progress Note      Kim Vasquez  CSN #:                  [id number]  :                       1960  ADMIT DATE:       2023 7:27 PM  [address] [place] DATE:  RESPONDING  PROVIDER #:        Holly Coulter MD          QUERY TEXT:    Patient admitted with Anemia. Per wound care, noted to also have pressure ulcer DTI left buttocks with surrounded chronic skin changes. If possible, please document in progress notes and discharge summary the location, present on admission status and stage of the pressure ulcer: The medical record reflects the following:  Risk Factors: presents with MSSA, A/C hypoxic respiratory failure, CHF  Clinical Indicators: noted by wound care left buttocks DTI surrounded by chronic skin changes. Treatment: zinc paste, pressure relief    Stage 1:  Non-blanchable erythema of intact skin  Stage 2:  Abrasion, Blister, Partial-thickness skin loss, with exposed dermis  Stage 3:  Full-thickness skin loss with damage or necrosis of subcutaneous tissue  Stage 4:  Full-thickness skin & soft tissue loss through to underlying muscle, tendon or bone  Unstageable: Obscured full-thickness skin & tissue loss  Options provided:  -- Deep tissue injury pressure ulcer of left buttocks present on admission  -- Other - I will add my own diagnosis  -- Disagree - Not applicable / Not valid  -- Disagree - Clinically unable to determine / Unknown  -- Refer to Clinical Documentation Reviewer    PROVIDER RESPONSE TEXT:    This patient has a deep tissue injury pressure ulcer of the left buttocks which was present on admission. Query created by:  Ashlyn Guzman on 2023 1:04 PM      Electronically signed by:  Holly Coulter MD 2023 5:22 PM

## 2023-01-31 ENCOUNTER — APPOINTMENT (OUTPATIENT)
Dept: GENERAL RADIOLOGY | Age: 63
End: 2023-01-31
Attending: PHYSICIAN ASSISTANT
Payer: MEDICARE

## 2023-01-31 LAB
ANION GAP SERPL CALC-SCNC: 5 MMOL/L (ref 5–15)
BASOPHILS # BLD: 0 K/UL (ref 0–0.1)
BASOPHILS NFR BLD: 0 % (ref 0–1)
BUN SERPL-MCNC: 16 MG/DL (ref 6–20)
BUN/CREAT SERPL: 18 (ref 12–20)
CALCIUM SERPL-MCNC: 7.6 MG/DL (ref 8.5–10.1)
CHLORIDE SERPL-SCNC: 98 MMOL/L (ref 97–108)
CO2 SERPL-SCNC: 35 MMOL/L (ref 21–32)
CREAT SERPL-MCNC: 0.89 MG/DL (ref 0.55–1.02)
DIFFERENTIAL METHOD BLD: ABNORMAL
EOSINOPHIL # BLD: 0.2 K/UL (ref 0–0.4)
EOSINOPHIL NFR BLD: 2 % (ref 0–7)
ERYTHROCYTE [DISTWIDTH] IN BLOOD BY AUTOMATED COUNT: 21.3 % (ref 11.5–14.5)
ERYTHROCYTE [DISTWIDTH] IN BLOOD BY AUTOMATED COUNT: 22.9 % (ref 11.5–14.5)
FIBRINOGEN PPP-MCNC: 232 MG/DL (ref 200–475)
GLUCOSE BLD STRIP.AUTO-MCNC: 108 MG/DL (ref 65–117)
GLUCOSE BLD STRIP.AUTO-MCNC: 118 MG/DL (ref 65–117)
GLUCOSE BLD STRIP.AUTO-MCNC: 124 MG/DL (ref 65–117)
GLUCOSE BLD STRIP.AUTO-MCNC: 92 MG/DL (ref 65–117)
GLUCOSE SERPL-MCNC: 91 MG/DL (ref 65–100)
HAPTOGLOB SERPL-MCNC: 125 MG/DL (ref 30–200)
HCT VFR BLD AUTO: 22.2 % (ref 35–47)
HCT VFR BLD AUTO: 25 % (ref 35–47)
HGB BLD-MCNC: 6.5 G/DL (ref 11.5–16)
HGB BLD-MCNC: 7.5 G/DL (ref 11.5–16)
HISTORY CHECKED?,CKHIST: NORMAL
IMM GRANULOCYTES # BLD AUTO: 0 K/UL (ref 0–0.04)
IMM GRANULOCYTES NFR BLD AUTO: 0 % (ref 0–0.5)
LDH SERPL L TO P-CCNC: 420 U/L (ref 81–246)
LYMPHOCYTES # BLD: 0.7 K/UL (ref 0.8–3.5)
LYMPHOCYTES NFR BLD: 6 % (ref 12–49)
MAGNESIUM SERPL-MCNC: 2.4 MG/DL (ref 1.6–2.4)
MCH RBC QN AUTO: 28.7 PG (ref 26–34)
MCH RBC QN AUTO: 29.3 PG (ref 26–34)
MCHC RBC AUTO-ENTMCNC: 29.3 G/DL (ref 30–36.5)
MCHC RBC AUTO-ENTMCNC: 30 G/DL (ref 30–36.5)
MCV RBC AUTO: 100 FL (ref 80–99)
MCV RBC AUTO: 95.8 FL (ref 80–99)
MONOCYTES # BLD: 0.2 K/UL (ref 0–1)
MONOCYTES NFR BLD: 2 % (ref 5–13)
NEUTS BAND NFR BLD MANUAL: 3 %
NEUTS SEG # BLD: 10 K/UL (ref 1.8–8)
NEUTS SEG NFR BLD: 87 % (ref 32–75)
NRBC # BLD: 0.08 K/UL (ref 0–0.01)
NRBC # BLD: 0.08 K/UL (ref 0–0.01)
NRBC BLD-RTO: 0.7 PER 100 WBC
NRBC BLD-RTO: 0.7 PER 100 WBC
PHOSPHATE SERPL-MCNC: 1.8 MG/DL (ref 2.6–4.7)
PLATELET # BLD AUTO: 116 K/UL (ref 150–400)
PLATELET # BLD AUTO: 128 K/UL (ref 150–400)
PMV BLD AUTO: 10.9 FL (ref 8.9–12.9)
PMV BLD AUTO: 11 FL (ref 8.9–12.9)
POTASSIUM SERPL-SCNC: 3.9 MMOL/L (ref 3.5–5.1)
PROCALCITONIN SERPL-MCNC: 0.61 NG/ML
RBC # BLD AUTO: 2.22 M/UL (ref 3.8–5.2)
RBC # BLD AUTO: 2.61 M/UL (ref 3.8–5.2)
RBC MORPH BLD: ABNORMAL
RETICS # AUTO: 0.13 M/UL (ref 0.02–0.08)
RETICS/RBC NFR AUTO: 4.8 % (ref 0.7–2.1)
SERVICE CMNT-IMP: ABNORMAL
SERVICE CMNT-IMP: ABNORMAL
SERVICE CMNT-IMP: NORMAL
SERVICE CMNT-IMP: NORMAL
SODIUM SERPL-SCNC: 138 MMOL/L (ref 136–145)
WBC # BLD AUTO: 11.1 K/UL (ref 3.6–11)
WBC # BLD AUTO: 11.1 K/UL (ref 3.6–11)

## 2023-01-31 PROCEDURE — 80048 BASIC METABOLIC PNL TOTAL CA: CPT

## 2023-01-31 PROCEDURE — 74011000258 HC RX REV CODE- 258: Performed by: HOSPITALIST

## 2023-01-31 PROCEDURE — 83735 ASSAY OF MAGNESIUM: CPT

## 2023-01-31 PROCEDURE — 74011000258 HC RX REV CODE- 258: Performed by: INTERNAL MEDICINE

## 2023-01-31 PROCEDURE — 65270000046 HC RM TELEMETRY

## 2023-01-31 PROCEDURE — 99232 SBSQ HOSP IP/OBS MODERATE 35: CPT | Performed by: NURSE PRACTITIONER

## 2023-01-31 PROCEDURE — 36415 COLL VENOUS BLD VENIPUNCTURE: CPT

## 2023-01-31 PROCEDURE — 85027 COMPLETE CBC AUTOMATED: CPT

## 2023-01-31 PROCEDURE — 84100 ASSAY OF PHOSPHORUS: CPT

## 2023-01-31 PROCEDURE — 94660 CPAP INITIATION&MGMT: CPT

## 2023-01-31 PROCEDURE — 87040 BLOOD CULTURE FOR BACTERIA: CPT

## 2023-01-31 PROCEDURE — 94640 AIRWAY INHALATION TREATMENT: CPT

## 2023-01-31 PROCEDURE — 83615 LACTATE (LD) (LDH) ENZYME: CPT

## 2023-01-31 PROCEDURE — 74011000250 HC RX REV CODE- 250: Performed by: NURSE PRACTITIONER

## 2023-01-31 PROCEDURE — 36430 TRANSFUSION BLD/BLD COMPNT: CPT

## 2023-01-31 PROCEDURE — P9016 RBC LEUKOCYTES REDUCED: HCPCS

## 2023-01-31 PROCEDURE — 74011250637 HC RX REV CODE- 250/637: Performed by: INTERNAL MEDICINE

## 2023-01-31 PROCEDURE — 74011250636 HC RX REV CODE- 250/636: Performed by: PHYSICIAN ASSISTANT

## 2023-01-31 PROCEDURE — 84145 PROCALCITONIN (PCT): CPT

## 2023-01-31 PROCEDURE — 85025 COMPLETE CBC W/AUTO DIFF WBC: CPT

## 2023-01-31 PROCEDURE — 74011250637 HC RX REV CODE- 250/637: Performed by: STUDENT IN AN ORGANIZED HEALTH CARE EDUCATION/TRAINING PROGRAM

## 2023-01-31 PROCEDURE — 74011000250 HC RX REV CODE- 250: Performed by: INTERNAL MEDICINE

## 2023-01-31 PROCEDURE — 83010 ASSAY OF HAPTOGLOBIN QUANT: CPT

## 2023-01-31 PROCEDURE — 86022 PLATELET ANTIBODIES: CPT

## 2023-01-31 PROCEDURE — 74011000250 HC RX REV CODE- 250: Performed by: STUDENT IN AN ORGANIZED HEALTH CARE EDUCATION/TRAINING PROGRAM

## 2023-01-31 PROCEDURE — 85045 AUTOMATED RETICULOCYTE COUNT: CPT

## 2023-01-31 PROCEDURE — 74011250636 HC RX REV CODE- 250/636: Performed by: NURSE PRACTITIONER

## 2023-01-31 PROCEDURE — 85384 FIBRINOGEN ACTIVITY: CPT

## 2023-01-31 PROCEDURE — 74011250636 HC RX REV CODE- 250/636: Performed by: HOSPITALIST

## 2023-01-31 PROCEDURE — 82962 GLUCOSE BLOOD TEST: CPT

## 2023-01-31 PROCEDURE — 71045 X-RAY EXAM CHEST 1 VIEW: CPT

## 2023-01-31 PROCEDURE — 77010033678 HC OXYGEN DAILY

## 2023-01-31 PROCEDURE — 74011250636 HC RX REV CODE- 250/636: Performed by: INTERNAL MEDICINE

## 2023-01-31 PROCEDURE — 99233 SBSQ HOSP IP/OBS HIGH 50: CPT | Performed by: INTERNAL MEDICINE

## 2023-01-31 RX ORDER — FUROSEMIDE 10 MG/ML
20 INJECTION INTRAMUSCULAR; INTRAVENOUS ONCE
Status: COMPLETED | OUTPATIENT
Start: 2023-01-31 | End: 2023-01-31

## 2023-01-31 RX ORDER — FUROSEMIDE 10 MG/ML
80 INJECTION INTRAMUSCULAR; INTRAVENOUS ONCE
Status: DISCONTINUED | OUTPATIENT
Start: 2023-01-31 | End: 2023-01-31

## 2023-01-31 RX ORDER — DIPHENHYDRAMINE HYDROCHLORIDE 50 MG/ML
25 INJECTION, SOLUTION INTRAMUSCULAR; INTRAVENOUS ONCE
Status: COMPLETED | OUTPATIENT
Start: 2023-01-31 | End: 2023-01-31

## 2023-01-31 RX ORDER — FUROSEMIDE 10 MG/ML
40 INJECTION INTRAMUSCULAR; INTRAVENOUS ONCE
Status: COMPLETED | OUTPATIENT
Start: 2023-01-31 | End: 2023-01-31

## 2023-01-31 RX ORDER — FUROSEMIDE 40 MG/1
40 TABLET ORAL DAILY
Status: DISCONTINUED | OUTPATIENT
Start: 2023-02-01 | End: 2023-02-10 | Stop reason: HOSPADM

## 2023-01-31 RX ORDER — DIPHENHYDRAMINE HCL 25 MG
25 CAPSULE ORAL EVERY 6 HOURS
Status: COMPLETED | OUTPATIENT
Start: 2023-01-31 | End: 2023-02-01

## 2023-01-31 RX ORDER — SODIUM CHLORIDE 9 MG/ML
250 INJECTION, SOLUTION INTRAVENOUS AS NEEDED
Status: DISCONTINUED | OUTPATIENT
Start: 2023-01-31 | End: 2023-02-06

## 2023-01-31 RX ADMIN — PANTOPRAZOLE SODIUM 40 MG: 40 TABLET, DELAYED RELEASE ORAL at 08:19

## 2023-01-31 RX ADMIN — DIPHENHYDRAMINE HYDROCHLORIDE 25 MG: 50 INJECTION, SOLUTION INTRAMUSCULAR; INTRAVENOUS at 05:55

## 2023-01-31 RX ADMIN — Medication 1 AMPULE: at 09:18

## 2023-01-31 RX ADMIN — DIPHENHYDRAMINE HYDROCHLORIDE 25 MG: 25 CAPSULE ORAL at 18:25

## 2023-01-31 RX ADMIN — MIDODRINE HYDROCHLORIDE 10 MG: 5 TABLET ORAL at 13:14

## 2023-01-31 RX ADMIN — MIDODRINE HYDROCHLORIDE 10 MG: 5 TABLET ORAL at 08:18

## 2023-01-31 RX ADMIN — SODIUM CHLORIDE, PRESERVATIVE FREE 10 ML: 5 INJECTION INTRAVENOUS at 05:55

## 2023-01-31 RX ADMIN — DIPHENHYDRAMINE HYDROCHLORIDE 25 MG: 25 CAPSULE ORAL at 11:37

## 2023-01-31 RX ADMIN — IPRATROPIUM BROMIDE AND ALBUTEROL SULFATE 3 ML: 2.5; .5 SOLUTION RESPIRATORY (INHALATION) at 19:50

## 2023-01-31 RX ADMIN — ATORVASTATIN CALCIUM 10 MG: 10 TABLET, FILM COATED ORAL at 22:00

## 2023-01-31 RX ADMIN — SODIUM CHLORIDE, PRESERVATIVE FREE 10 ML: 5 INJECTION INTRAVENOUS at 21:16

## 2023-01-31 RX ADMIN — CASTOR OIL AND BALSAM, PERU: 788; 87 OINTMENT TOPICAL at 09:18

## 2023-01-31 RX ADMIN — POTASSIUM PHOSPHATE, MONOBASIC AND POTASSIUM PHOSPHATE, DIBASIC: 224; 236 INJECTION, SOLUTION, CONCENTRATE INTRAVENOUS at 06:23

## 2023-01-31 RX ADMIN — NAFCILLIN SODIUM 2 G: 2 INJECTION, POWDER, LYOPHILIZED, FOR SOLUTION INTRAMUSCULAR; INTRAVENOUS at 21:14

## 2023-01-31 RX ADMIN — NAFCILLIN SODIUM 2 G: 2 INJECTION, POWDER, LYOPHILIZED, FOR SOLUTION INTRAMUSCULAR; INTRAVENOUS at 10:18

## 2023-01-31 RX ADMIN — SODIUM CHLORIDE, PRESERVATIVE FREE 10 ML: 5 INJECTION INTRAVENOUS at 16:09

## 2023-01-31 RX ADMIN — FOLIC ACID: 5 INJECTION, SOLUTION INTRAMUSCULAR; INTRAVENOUS; SUBCUTANEOUS at 11:37

## 2023-01-31 RX ADMIN — FUROSEMIDE 20 MG: 10 INJECTION, SOLUTION INTRAMUSCULAR; INTRAVENOUS at 09:17

## 2023-01-31 RX ADMIN — POTASSIUM BICARBONATE 20 MEQ: 391 TABLET, EFFERVESCENT ORAL at 09:18

## 2023-01-31 RX ADMIN — PANTOPRAZOLE SODIUM 40 MG: 40 TABLET, DELAYED RELEASE ORAL at 18:25

## 2023-01-31 RX ADMIN — PIPERACILLIN AND TAZOBACTAM 3.38 G: 3; .375 INJECTION, POWDER, FOR SOLUTION INTRAVENOUS at 02:45

## 2023-01-31 RX ADMIN — Medication 1 AMPULE: at 21:16

## 2023-01-31 RX ADMIN — CASTOR OIL AND BALSAM, PERU: 788; 87 OINTMENT TOPICAL at 21:16

## 2023-01-31 RX ADMIN — FUROSEMIDE 40 MG: 10 INJECTION, SOLUTION INTRAMUSCULAR; INTRAVENOUS at 23:15

## 2023-01-31 RX ADMIN — NAFCILLIN SODIUM 2 G: 2 INJECTION, POWDER, LYOPHILIZED, FOR SOLUTION INTRAMUSCULAR; INTRAVENOUS at 16:08

## 2023-01-31 RX ADMIN — MIDODRINE HYDROCHLORIDE 10 MG: 5 TABLET ORAL at 18:25

## 2023-01-31 NOTE — PROGRESS NOTES
Hospitalist Progress Note    NAME: Sara Haji   :  1960   MRN:  [id number]     Mae Keys is a 60 y.o.  female with pertinent past medical history of chronic hypoxemic respiratory failure on 4 L/min home oxygen therapy secondary to COPD, diabetes mellitus, essential hypertension, hyperlipidemia, JOSLYN compliant with CPAP, history duodenal ulcers, chronic anemia of indeterminate etiology who was came to ED after her routine lab work showed Hb of 5.3. received PRBC transfusion, Occult stool negative, GI recommend Hematology evaluation. She develop acute hypercapnic respiratory failure on floor and was transferred to ICU on . Now on 6 liters oxygen and transferred to hospitalist service on . Assessment / Plan:  Acute on chronic hypoxic respiratory failure  COPD  JOSLYN compliant with CPAP  Diastolic heart failure     Transferred to ICU on  for acute hypercapnic resp failure  Avoid Diamox  Continue NC during day, wean as tolerated and NIPPV for Naps and nightly. Continue nebs  Resume oral lasix    Septic shock  MSSA bacteremia  Left femoral neck fracture:    Off pressors now  Ortho to review CT pelvis-? Aspiration of fluid in left hip. S/p Zosyn, now on Nafcillin  Repeat blood cultures  Respiratory cultures  Apiration precuation  May need YANNA  Cw midodrine  ID following    Acute on chronic anemia:  GI following, will be high risk for Endoscopy, colonscopy  Occult stool negative. No signs of active bleeding  GI considering Capsule study  Hematology following  Has folate and iron deficiency, started on iv folic acid and iv iron last week  S/p transfusion yesterday and today as well. Erythematous rash:  Has this rash on b/l uper and lower ext, chest as well  ? D/t medication, zosyn switched to Nafciliin     Type II DM:  Continue lispro sliding scale     Paroxysmal afib  CAD:  Continue statin. Metoprolol held??, resume if HR remains stable  Aspirin held.       Severe obesity  BMI 46     Code status: Full  Prophylaxis: SCD's  Recommended Disposition: Home w/Family       Subjective:     Chief Complaint / Reason for Physician Visit  Seen in ICU  Doesn't feel better today, has left hip pain  Has rash through out her body    Review of Systems:  Symptom Y/N Comments  Symptom Y/N Comments   Fever/Chills n   Chest Pain n    Poor Appetite y   Edema n    Cough n   Abdominal Pain n    Sputum n   Joint Pain y Left hip pain   SOB/CHOPRA n   Pruritis/Rash     Nausea/vomit    Tolerating PT/OT     Diarrhea    Tolerating Diet     Constipation    Other       Could NOT obtain due to:      Objective:     VITALS:   Last 24hrs VS reviewed since prior progress note.  Most recent are:  Patient Vitals for the past 24 hrs:   Temp Pulse Resp BP SpO2   01/31/23 1530 -- 84 (!) 31 (!) 107/46 97 %   01/31/23 1500 -- 92 (!) 36 -- 94 %   01/31/23 1400 -- 76 25 128/74 93 %   01/31/23 1300 -- 70 26 (!) 119/54 96 %   01/31/23 1200 -- 70 24 (!) 114/54 97 %   01/31/23 1130 -- 79 (!) 33 135/64 95 %   01/31/23 1100 -- 75 (!) 31 124/62 96 %   01/31/23 1000 -- 85 (!) 35 (!) 118/51 95 %   01/31/23 0917 -- -- -- 95/80 --   01/31/23 0900 -- 89 (!) 31 95/80 91 %   01/31/23 0800 97.7 °F (36.5 °C) 71 -- (!) 100/44 91 %   01/31/23 0700 -- 73 -- (!) 95/43 94 %   01/31/23 0645 98.1 °F (36.7 °C) 74 -- (!) 98/44 94 %   01/31/23 0630 98.3 °F (36.8 °C) 76 -- (!) 97/46 97 %   01/31/23 0615 98.8 °F (37.1 °C) 75 22 (!) 99/46 95 %   01/31/23 0610 98.8 °F (37.1 °C) 76 24 (!) 100/47 95 %   01/31/23 0600 -- 79 -- (!) 107/52 96 %   01/31/23 0523 -- 75 -- (!) 102/47 96 %   01/31/23 0400 98.2 °F (36.8 °C) 76 25 -- 99 %   01/31/23 0246 98.4 °F (36.9 °C) 73 21 (!) 102/43 93 %   01/31/23 0200 -- 74 20 (!) 99/44 94 %   01/31/23 0100 -- 72 22 (!) 111/45 95 %   01/31/23 0000 98.7 °F (37.1 °C) 74 20 (!) 116/44 95 %   01/30/23 2316 -- 83 (!) 33 (!) 117/47 98 %   01/30/23 2300 -- 81 (!) 31 -- 96 %   01/30/23 2200 -- 78 29 -- 97 %   01/30/23 2100 -- 76 (!) 31 (!) 117/50 96 %   01/30/23 2000 98.2 °F (36.8 °C) 76 29 (!) 110/52 98 %   01/30/23 1900 -- 75 (!) 33 (!) 119/53 100 %   01/30/23 1800 -- 84 30 (!) 140/62 97 %   01/30/23 1700 -- 88 24 (!) 157/63 97 %   01/30/23 1645 -- 93 25 128/82 91 %   01/30/23 1632 -- 89 -- 127/67 --   01/30/23 1630 -- 87 (!) 42 127/67 97 %   01/30/23 1600 98.9 °F (37.2 °C) 76 11 (!) 108/54 97 %       Intake/Output Summary (Last 24 hours) at 1/31/2023 1546  Last data filed at 1/31/2023 1015  Gross per 24 hour   Intake 995.58 ml   Output 1500 ml   Net -504.42 ml        I had a face to face encounter and independently examined this patient on 1/31/2023, as outlined below:  PHYSICAL EXAM:  General: Awake, No acute distress  EENT:  EOMI. Anicteric sclerae. MMM  Resp:  Decreased breath sound b/l  CV:  Regular  rhythm,  No edema  GI:  Soft, Non distended, Non tender. +Bowel sounds  Neurologic:  Alert and oriented X 3, normal speech,   Psych:   Not anxious nor agitated  Skin:  No rashes. No jaundice    Reviewed most current lab test results and cultures  YES  Reviewed most current radiology test results   YES  Review and summation of old records today    NO  Reviewed patient's current orders and MAR    YES  PMH/ reviewed - no change compared to H&P  ________________________________________________________________________  Care Plan discussed with:    Comments   Patient y    Family      RN y    Care Manager     Consultant                       y Multidiciplinary team rounds were held today with , nursing, pharmacist and clinical coordinator. Patient's plan of care was discussed; medications were reviewed and discharge planning was addressed.      ________________________________________________________________________  Total NON critical care TIME:  70   Minutes    Total CRITICAL CARE TIME Spent:   Minutes non procedure based      Comments   >50% of visit spent in counseling and coordination of care ________________________________________________________________________  Karissa Bales MD     Procedures: see electronic medical records for all procedures/Xrays and details which were not copied into this note but were reviewed prior to creation of Plan. LABS:  I reviewed today's most current labs and imaging studies. Pertinent labs include:  Recent Labs     01/31/23  1042 01/31/23  0254 01/30/23  0138   WBC 11.1* 11.1* 13.6*   HGB 7.5* 6.5* 7.0*   HCT 25.0* 22.2* 23.6*   * 128* 167     Recent Labs     01/31/23  0254 01/30/23  2213 01/30/23  1031 01/30/23  0138 01/29/23  1238 01/29/23  0332    137 136 136   < > 136   K 3.9 4.3 3.2* 3.4*   < > 2.4*   CL 98 96* 96* 95*   < > 92*   CO2 35* 35* 35* 36*   < > 37*   GLU 91 120* 161* 141*   < > 161*   BUN 16 18 21* 24*   < > 31*   CREA 0.89 0.99 1.08* 1.19*   < > 1.28*   CA 7.6* 7.7* 7.3* 7.6*   < > 7.7*   MG 2.4  --  2.2 2.4   < > 2.1   PHOS 1.8*  --  2.6 1.9*   < > 2.5*   ALB  --   --   --   --   --  2.1*   TBILI  --   --   --   --   --  6.0*   ALT  --   --   --   --   --  30    < > = values in this interval not displayed.        Signed: Karissa Bales MD

## 2023-01-31 NOTE — PROGRESS NOTES
Critical Care Progress Note  Cinthia Frye MD          Date of Service:  2023  NAME:  Troy Sanz  :  1960  MRN:  [id number]      Subjective/Hospital course: Lynne Montiel is a 60 y.o.  female with pertinent past medical history of chronic hypoxemic hypercapnic respiratory failure on 4 L/min home oxygen (daytime) and Trilogy at night, COPD, JOSLYN/OHS, diabetes mellitus, essential hypertension, hyperlipidemia,history duodenal ulcers, chronic anemia of indeterminate etiology who was admitted  with hemoglobin of 5.3. At the time, CXR demonstrates diffuse hazy interstitial opacities suggestive of edema with bandlike right upper lobe atelectasis. Patient was transfused with PRBC. GI consulted. GI recommends hematology evaluation. Deferred EGD at this time as patient doesn't have signs of overt bleeding and fecal occult blood test was negative. Per notes patient began complaining of increased shortness of breath on  and had desaturation with exertion (baseline 4LNC). She was given Lasix and CPAP. , Diamox was added for concern for \"CO2 is high\" . She was increased to [facility]. Overnight, she was increased to 10L then 15L early morning . ABG revealed 7.25/105/24 on 15L. She was placed on bipap and transferred to CCU as stepdown overflow.  - Remained on Bipap - avaps mode overnight. Mental status significantly improved. Mild hypotension yesterday, started on low dose NE  : on 3 mcg of epi  : Patient had an episode of V tach this morning. She is alert and oriented. No acute events overnight.  - No further arrhythmia issues. Weaning NE   - developed rash overnight        Problem list:     Septic shock  Hypercarbic respiratory failure  MSSA bacteremia.   Left femur fx  Hypokalmia  V tach    Assessment/Plan:   PULMONARY:  Acute on chronic hypercapnic hypoxic respiratory failure  Flu covid negative   Appears she was getting CPAP on the floor not home Trilogy   Needs ventilatory support with Bipap or trilogy   Chronic metabolic alkalosis secondary to chronic respiratory acidosis  Base bicarb calc should be approx 40, with CO2 likely 70-80 per abg and metabolic panel         Plan  - avoid diamox   - Bipap adjusted to AVAPs with 7cc/kg   Transition to NC during day   Continue NIPPV with naps and nightly   - close monitoring of mental status   - Continue nebulized bronchodilators  - Supplemental O2 to maintain SpO2 90-92              Avoid hyperoxia   - resume gentle diuresis for goal negative 500        CARDIOVASCULAR/HEMODYNAMIC:    V tach. Likely from severe hypokalemia. Keep mag >2 and K >4. ECHO benign but poor windows  If persistently positive blood cx, will need to discuss YANNA but high risk at this point. Would need to be intubated for that.         Current vasopressors: low dose NE      Plan  - ICU hemodynamic/cardiac monitoring  - MAP goal > 65 mmHg  - continue midodrine            RENAL:  Hypokalemia - improved      Plan  - Monitor I/Os and Uo  - Monitor renal function panel intermittently  - Correct electrolytes as needed  - Avoid nephrotoxic meds     GI/NUTRITION:  Obesity   Risk for malnutrition      Plan  - advance diet as tolerated to cardiac diet     INFECTIOUS DISEASE  Bacteremia  Blood culture 1/1 + MSSA 1/21  Blood culture 1/2 + GPC 1/25 1/27 - NGTD  1/29 - NGTD  Ct pelvis with complex effusion on left hip     Plan  - DC zosyn with rash  - previously tolerated nafcillin - discuss with ID   - repeat blood cultures daily until clear  - trend procal intermittently        HEME  Chronic anemia  Slowly trending down    Transfused 2 prbc 1/30   Inappropriate response  Hematology / GI consulted on floor   GI signed off  Non invasive pill cam?        Plan  - appreciate heme recs   - Daily CBC  - Transfuse as needed to maintain Hgb > 7.0 gm/dL or for hemodynamically significant bleeding  - repeat cbc -confirm response to transfusion   - no plans for GI intervention. - continue protonix  - hold home xarelto  - hold lovenox prophyl     NEURO  CO2 narcosis - improved   Bed bound at baseline- PT signed off      Plan  - ABCDEF mobility bundle         ENDO  No acute issues        Other  - complaining of hip / back pain   Xray hip 1/26 with femoral neck fracture   Ortho consulted - not a surgical candidate    Will re discuss with ortho ability to sample effusion    - right big toe infection s/p nail avulsion   wound care    Code status: 1/29 -   Addendum:  -Patient is alert and oriented x4. She wanted to changed her code status to DNR and did not want intubation at all. I asked her to inform her family of her decision so that they are on the same page. She then wanted me to call her daughter and update her about DNR. I called her daughter and gave her clinical update and also informed about DNR. Also changed her code status to DNR. Pete Lal MD     I personally spent 45 minutes of critical care time. This is time spent at this critically ill patient's bedside actively involved in patient care as well as the coordination of care and discussions with the patient's family. This does not include any procedural time which has been billed separately.          Review of Systems:   ROS   Back pain    Vital Signs:   Patient Vitals for the past 4 hrs:   BP Temp Pulse Resp SpO2   01/31/23 0800 (!) 100/44 97.7 °F (36.5 °C) 71 -- 91 %   01/31/23 0700 (!) 95/43 -- 73 -- 94 %   01/31/23 0645 (!) 98/44 98.1 °F (36.7 °C) 74 -- 94 %   01/31/23 0630 (!) 97/46 98.3 °F (36.8 °C) 76 -- 97 %   01/31/23 0615 (!) 99/46 98.8 °F (37.1 °C) 75 22 95 %   01/31/23 0610 (!) 100/47 98.8 °F (37.1 °C) 76 24 95 %   01/31/23 0600 (!) 107/52 -- 79 -- 96 %   01/31/23 0523 (!) 102/47 -- 75 -- 96 %          Intake/Output Summary (Last 24 hours) at 1/31/2023 0857  Last data filed at 1/31/2023 3485  Gross per 24 hour   Intake 1135.1 ml   Output 1150 ml   Net -14.9 ml          Physical Examination:    Physical Exam  Morbidly obese  HEENT: normal  Heart: s1,s2  Lungs : clear  Abd: soft  Ext: left femur fx  Right 1+ edema  Cns: AAO x4  Labs and Imaging:   Reviewed.       Medications:     Current Facility-Administered Medications   Medication Dose Route Frequency    0.9% sodium chloride infusion 250 mL  250 mL IntraVENous PRN    acetaminophen (TYLENOL) solution 650 mg  650 mg Oral ONCE    potassium phosphate 15 mmol in 0.9% sodium chloride 250 mL infusion   IntraVENous ONCE    0.9% sodium chloride infusion 250 mL  250 mL IntraVENous PRN    [Held by provider] piperacillin-tazobactam (ZOSYN) 3.375 g in 0.9% sodium chloride (MBP/ADV) 100 mL MBP  3.375 g IntraVENous Q8H    midodrine (PROAMATINE) tablet 10 mg  10 mg Oral TID WITH MEALS    [Held by provider] enoxaparin (LOVENOX) injection 30 mg  30 mg SubCUTAneous Q12H    alcohol 62% (NOZIN) nasal  1 Ampule  1 Ampule Topical Q12H    insulin lispro (HUMALOG) injection   SubCUTAneous Q6H    glucose chewable tablet 16 g  4 Tablet Oral PRN    glucagon (GLUCAGEN) injection 1 mg  1 mg IntraMUSCular PRN    [Held by provider] furosemide (LASIX) injection 20 mg  20 mg IntraVENous BID    [Held by provider] metoprolol tartrate (LOPRESSOR) tablet 12.5 mg  12.5 mg Oral BID    folic acid (FOLVITE) tablet 1 mg  1 mg Oral DAILY    albuterol-ipratropium (DUO-NEB) 2.5 MG-0.5 MG/3 ML  3 mL Nebulization Q4H PRN    atorvastatin (LIPITOR) tablet 10 mg  10 mg Oral QHS    pantoprazole (PROTONIX) tablet 40 mg  40 mg Oral ACB&D    sodium chloride (NS) flush 5-40 mL  5-40 mL IntraVENous Q8H    sodium chloride (NS) flush 5-40 mL  5-40 mL IntraVENous PRN    acetaminophen (TYLENOL) tablet 650 mg  650 mg Oral Q6H PRN    Or    acetaminophen (TYLENOL) suppository 650 mg  650 mg Rectal Q6H PRN    polyethylene glycol (MIRALAX) packet 17 g  17 g Oral DAILY PRN    ondansetron (ZOFRAN ODT) tablet 4 mg  4 mg Oral Q8H PRN    Or    ondansetron (ZOFRAN) injection 4 mg  4 mg IntraVENous Q6H PRN    dextrose 10% infusion 0-250 mL  0-250 mL IntraVENous PRN    balsam peru-castor oiL (VENELEX) ointment   Topical BID     ______________________________________________________________________  EXPECTED LENGTH OF STAY: 2d 16h  ACTUAL LENGTH OF STAY:          9                 Andrew Brett, MD   Pulmonary/CCM  [address] [facility]  [phone]

## 2023-01-31 NOTE — PROGRESS NOTES
Hematology Oncology Progress Note    Follow up for: anemia    Chart notes reviewed since last visit. Case discussed with following:     Patient complains of the following: Weaned off Levophed yesterday, bp a little soft. PRBC yesterday and today.     Additional concerns noted by the staff:  NurseTika     Patient Vitals for the past 24 hrs:   BP Temp Pulse Resp SpO2 Height   01/31/23 0917 95/80 -- -- -- -- --   01/31/23 0900 95/80 -- 89 (!) 31 91 % --   01/31/23 0800 (!) 100/44 97.7 °F (36.5 °C) 71 -- 91 % --   01/31/23 0700 (!) 95/43 -- 73 -- 94 % --   01/31/23 0645 (!) 98/44 98.1 °F (36.7 °C) 74 -- 94 % --   01/31/23 0630 (!) 97/46 98.3 °F (36.8 °C) 76 -- 97 % --   01/31/23 0615 (!) 99/46 98.8 °F (37.1 °C) 75 22 95 % --   01/31/23 0610 (!) 100/47 98.8 °F (37.1 °C) 76 24 95 % --   01/31/23 0600 (!) 107/52 -- 79 -- 96 % --   01/31/23 0523 (!) 102/47 -- 75 -- 96 % --   01/31/23 0400 -- 98.2 °F (36.8 °C) 76 25 99 % --   01/31/23 0246 (!) 102/43 98.4 °F (36.9 °C) 73 21 93 % --   01/31/23 0200 (!) 99/44 -- 74 20 94 % --   01/31/23 0100 (!) 111/45 -- 72 22 95 % --   01/31/23 0000 (!) 116/44 98.7 °F (37.1 °C) 74 20 95 % --   01/30/23 2316 (!) 117/47 -- 83 (!) 33 98 % --   01/30/23 2300 -- -- 81 (!) 31 96 % --   01/30/23 2200 -- -- 78 29 97 % --   01/30/23 2100 (!) 117/50 -- 76 (!) 31 96 % --   01/30/23 2000 (!) 110/52 98.2 °F (36.8 °C) 76 29 98 % --   01/30/23 1900 (!) 119/53 -- 75 (!) 33 100 % --   01/30/23 1800 (!) 140/62 -- 84 30 97 % --   01/30/23 1700 (!) 157/63 -- 88 24 97 % --   01/30/23 1645 128/82 -- 93 25 91 % --   01/30/23 1632 127/67 -- 89 -- -- --   01/30/23 1630 127/67 -- 87 (!) 42 97 % --   01/30/23 1600 (!) 108/54 98.9 °F (37.2 °C) 76 11 97 % --   01/30/23 1545 (!) 113/55 -- 78 21 98 % --   01/30/23 1530 (!) 115/55 98.8 °F (37.1 °C) 80 24 97 % --   01/30/23 1515 (!) 109/53 -- 79 26 97 % --   01/30/23 1500 134/60 -- 84 (!) 32 98 % --   01/30/23 1445 (!) 114/50 98.9 °F (37.2 °C) 80 (!) 31 97 % -- 01/30/23 1440 -- -- -- -- -- 5' 3\" (1.6 m)   01/30/23 1430 (!) 121/56 98.9 °F (37.2 °C) 84 (!) 37 99 % --   01/30/23 1417 (!) 115/51 98.2 °F (36.8 °C) 80 30 97 % --   01/30/23 1400 (!) 130/59 -- 86 (!) 40 97 % --   01/30/23 1330 (!) 118/54 -- 78 26 96 % --   01/30/23 1300 (!) 135/58 -- 83 (!) 32 97 % --   01/30/23 1230 115/61 -- 78 17 99 % --   01/30/23 1200 (!) 121/57 98.8 °F (37.1 °C) 76 (!) 34 98 % --   01/30/23 1136 (!) 116/49 -- 79 -- -- --   01/30/23 1130 (!) 116/49 -- 78 28 97 % --   01/30/23 1100 (!) 118/56 -- 73 25 96 % --   01/30/23 1030 (!) 149/57 -- 83 28 98 % --   01/30/23 1000 (!) 137/59 -- 87 (!) 38 96 % --       Physical Examination:  Gen some distress  Resp: 6L NC, distant anteriorly  Abd Obese  Skin: bright erythematous macular rash on chest and UE volar surfaces      Labs:  Recent Results (from the past 24 hour(s))   METABOLIC PANEL, BASIC    Collection Time: 01/30/23 10:31 AM   Result Value Ref Range    Sodium 136 136 - 145 mmol/L    Potassium 3.2 (L) 3.5 - 5.1 mmol/L    Chloride 96 (L) 97 - 108 mmol/L    CO2 35 (H) 21 - 32 mmol/L    Anion gap 5 5 - 15 mmol/L    Glucose 161 (H) 65 - 100 mg/dL    BUN 21 (H) 6 - 20 MG/DL    Creatinine 1.08 (H) 0.55 - 1.02 MG/DL    BUN/Creatinine ratio 19 12 - 20      eGFR 58 (L) >60 ml/min/1.73m2    Calcium 7.3 (L) 8.5 - 10.1 MG/DL   MAGNESIUM    Collection Time: 01/30/23 10:31 AM   Result Value Ref Range    Magnesium 2.2 1.6 - 2.4 mg/dL   PHOSPHORUS    Collection Time: 01/30/23 10:31 AM   Result Value Ref Range    Phosphorus 2.6 2.6 - 4.7 MG/DL   TYPE & SCREEN    Collection Time: 01/30/23 10:31 AM   Result Value Ref Range    Crossmatch Expiration 02/02/2023,2359     ABO/Rh(D) O POSITIVE     Antibody screen NEG     Unit number [id number]     Blood component type  LR,2     Unit division 00     Status of unit ISSUED     Crossmatch result Compatible     Unit number [id number]     Blood component type  LR,1     Unit division 00     Status of unit ISSUED Crossmatch result Compatible    GLUCOSE, POC    Collection Time: 01/30/23 11:36 AM   Result Value Ref Range    Glucose (POC) 141 (H) 65 - 117 mg/dL    Performed by Jose Rodgers RN    GLUCOSE, POC    Collection Time: 01/30/23  6:02 PM   Result Value Ref Range    Glucose (POC) 122 (H) 65 - 117 mg/dL    Performed by Jose (TIESHA RN)    METABOLIC PANEL, BASIC    Collection Time: 01/30/23 10:13 PM   Result Value Ref Range    Sodium 137 136 - 145 mmol/L    Potassium 4.3 3.5 - 5.1 mmol/L    Chloride 96 (L) 97 - 108 mmol/L    CO2 35 (H) 21 - 32 mmol/L    Anion gap 6 5 - 15 mmol/L    Glucose 120 (H) 65 - 100 mg/dL    BUN 18 6 - 20 MG/DL    Creatinine 0.99 0.55 - 1.02 MG/DL    BUN/Creatinine ratio 18 12 - 20      eGFR >60 >60 ml/min/1.73m2    Calcium 7.7 (L) 8.5 - 10.1 MG/DL   GLUCOSE, POC    Collection Time: 01/31/23 12:21 AM   Result Value Ref Range    Glucose (POC) 108 65 - 117 mg/dL    Performed by Rickey Orosco RN    PROCALCITONIN    Collection Time: 01/31/23  2:54 AM   Result Value Ref Range    Procalcitonin 2.44 ng/mL   METABOLIC PANEL, BASIC    Collection Time: 01/31/23  2:54 AM   Result Value Ref Range    Sodium 138 136 - 145 mmol/L    Potassium 3.9 3.5 - 5.1 mmol/L    Chloride 98 97 - 108 mmol/L    CO2 35 (H) 21 - 32 mmol/L    Anion gap 5 5 - 15 mmol/L    Glucose 91 65 - 100 mg/dL    BUN 16 6 - 20 MG/DL    Creatinine 0.89 0.55 - 1.02 MG/DL    BUN/Creatinine ratio 18 12 - 20      eGFR >60 >60 ml/min/1.73m2    Calcium 7.6 (L) 8.5 - 10.1 MG/DL   MAGNESIUM    Collection Time: 01/31/23  2:54 AM   Result Value Ref Range    Magnesium 2.4 1.6 - 2.4 mg/dL   PHOSPHORUS    Collection Time: 01/31/23  2:54 AM   Result Value Ref Range    Phosphorus 1.8 (L) 2.6 - 4.7 MG/DL   CBC W/O DIFF    Collection Time: 01/31/23  2:54 AM   Result Value Ref Range    WBC 11.1 (H) 3.6 - 11.0 K/uL    RBC 2.22 (L) 3.80 - 5.20 M/uL    HGB 6.5 (L) 11.5 - 16.0 g/dL    HCT 22.2 (L) 35.0 - 47.0 %    .0 (H) 80.0 - 99.0 FL    MCH 29.3 26.0 - 34.0 PG    MCHC 29.3 (L) 30.0 - 36.5 g/dL    RDW 21.3 (H) 11.5 - 14.5 %    PLATELET 649 (L) 388 - 400 K/uL    MPV 10.9 8.9 - 12.9 FL    NRBC 0.7 (H) 0  WBC    ABSOLUTE NRBC 0.08 (H) 0.00 - 0.01 K/uL   RBC, ALLOCATE    Collection Time: 01/31/23  5:30 AM   Result Value Ref Range    HISTORY CHECKED? Historical check performed    GLUCOSE, POC    Collection Time: 01/31/23  5:54 AM   Result Value Ref Range    Glucose (POC) 92 65 - 117 mg/dL    Performed by Laura Wild RN        Assessment and Plan:     Anemia:  -folate and iron deficiency, started on folic acid and IV iron given last week.  -transfuse for HBG < 7, getting transfusion today   -no sign of hemolysis  -PRBC given 1/30 and today. - Will switch folic acid to IV to ensure she is able to absorb supplement, she is still macrocytic and anemia not responding well to prbc, has some diarrhea issues well making me question absorption. -GI following, needing to optimise resp status before any attempt at EGD/colonoscopy, no reports of GI bleeding here. - transfused yesterday and today, not responding appropriately, no fever but new rash. Discussed with Dr. Domenico Kapoor and will check ldh, hapto, DIC labs, cbc now to ensure not DIC or transfusion reaction/hemolysis. No bleeding reported. Addendum at 5:12 pm: Labs reviewed, no hemolysis no evidence of DIC. Had appropriate response to 1U PRBC with hb increase from 6.5 to 7.5. Will continue to watch cbc closely. Monitor for any bleeding. Watch plt as they are down trending. Could be drug related vs sepsis, vs some component of consumption, no dic. Seems to have started going down last few days. Lovenox has lower incidence of HIT but not impossible. Looks like lovenox was started on 1/29, likely  has had prior heparin exposure given prior admits etc 4 t score intermediate, will send HIT ab. Continue holding lovenox for now. Resp distress  -On 6L NC  -improving      Sepsis  - Weaned off Levophed yesterday.  On midodrine    Rash:   - likely drug rash, switched zosyn to nafillin per primary.   - Rash appeared yesterday and received zosyn, IV Kcl, and blood.  No fever

## 2023-01-31 NOTE — PROGRESS NOTES
0700- Bedside change of shift report given to Kylee Stern RN (oncoming nurse) by Gerard Ang RN (offgoing nurse). Report included the following information SBAR, Kardex, MAR, Recent Results, Intake/Output & Cardiac Rhythm, NSR.     0800- Shift assessment complete; see flowsheets and MAR for details. 0830- Bipap removed and transitioned to 6L NC.      0908- Blood transfusion complete; patient tolerated well.     7332- Verbal update given to patient's daughter. 1000- Dr. Angelica Quintanilla with Heme/Onc rounding on patient. New lab orders placed. 1- Interdisciplinary team rounds were held 1/31/2023 with the following team members:Diabetes Treatment Specialist, Nursing, Nutrition, Pharmacy, and Physician. Plan of care discussed. See clinical pathway and/or care plan for interventions and desired outcomes. Goals of the Day: Per ID, Contact Ortho to determine if left hip can be aspirated and sampled. Contact GI to determine if pillcam study is needed. 1045- Labs sent. 1125- Paged [facility] regarding possible Pillcam study; spoke with Genevieve Lopez NP.     [id number] [facility] serve message sent to Orthopedic Surgery (/Fredis Cazares) regarding possible left hip aspiration. 1135- ID at bedside; updated on overnight events. 1310- GI NP at bedside with patient. 1330- Palliative Care NP at bedside with patient. 1- Moved patient from ICU bed to bariatric specialty bed. 1540- Spoke with [employer]; plan for joint aspiration tomorrow. 65- Dr. Oscar Gonzalez with GI at bedside. Plan for EGD at the bedside tomorrow. 1600- Reassessment complete; see flowsheets & MAR for details. 1900- Bedside change of shift report given to MARIE Anderson (oncoming nurse).

## 2023-01-31 NOTE — PROGRESS NOTES
Progress Note  Date:2023       Room:[address] [place] [address]  Patient Verito Rooney     Date of Birth:[birth year]     Age:62 y.o. Subjective    Subjective:  Symptoms:  She reports weakness. Diet:  Adequate intake. No nausea or vomiting. Pain:  She reports no pain. Review of Systems   Constitutional:  Positive for fatigue. Negative for appetite change and fever. Gastrointestinal:  Negative for abdominal pain, blood in stool, nausea and vomiting. Skin:  Negative for pallor. Neurological:  Positive for weakness. Objective         Vitals Last 24 Hours:  TEMPERATURE:  Temp  Av.5 °F (36.9 °C)  Min: 97.7 °F (36.5 °C)  Max: 98.9 °F (37.2 °C)  RESPIRATIONS RANGE: Resp  Av.9  Min: 11  Max: 42  PULSE OXIMETRY RANGE: SpO2  Av %  Min: 91 %  Max: 100 %  PULSE RANGE: Pulse  Av.8  Min: 70  Max: 93  BLOOD PRESSURE RANGE: Systolic (44RBN), IPZ:044 , Min:95 , GMI:766   ; Diastolic (28ZQD), DJX:33, Min:43, Max:82    I/O (24Hr): Intake/Output Summary (Last 24 hours) at 2023 1350  Last data filed at 2023 1015  Gross per 24 hour   Intake 1235.58 ml   Output 1500 ml   Net -264.42 ml     Objective:  General Appearance:  Not in pain and ill-appearing. Vital signs: (most recent): Blood pressure (!) 119/54, pulse 70, temperature 97.7 °F (36.5 °C), resp. rate 26, height 5' 3\" (1.6 m), weight 132 kg (291 lb 0.1 oz), SpO2 96 %, not currently breastfeeding. No fever. Output: Producing stool. HEENT: Normal HEENT exam.    Lungs:  Normal effort and normal respiratory rate. Breath sounds clear to auscultation. Heart: Normal rate. Regular rhythm. S1 normal and S2 normal.  No murmur. Abdomen: Abdomen is soft and non-distended. Bowel sounds are normal.   There is generalized tenderness. Extremities: Normal range of motion. There is dependent edema. Pulses: Distal pulses are intact.     Neurological: Patient is alert and oriented to person, place and time. Pupils:  Pupils are equal, round, and reactive to light. Skin:  Warm and dry. (Erythematous rash trunk and UE)  Labs/Imaging/Diagnostics    Labs:  CBC:  Recent Labs     01/31/23  1042 01/31/23  0254 01/30/23  0138   WBC 11.1* 11.1* 13.6*   RBC 2.61* 2.22* 2.35*   HGB 7.5* 6.5* 7.0*   HCT 25.0* 22.2* 23.6*   MCV 95.8 100.0* 100.4*   RDW 22.9* 21.3* 20.6*   * 128* 167     CHEMISTRIES:  Recent Labs     01/31/23  0254 01/30/23  2213 01/30/23  1031 01/30/23  0138    137 136 136   K 3.9 4.3 3.2* 3.4*   CL 98 96* 96* 95*   CO2 35* 35* 35* 36*   BUN 16 18 21* 24*   CA 7.6* 7.7* 7.3* 7.6*   PHOS 1.8*  --  2.6 1.9*   MG 2.4  --  2.2 2.4   PT/INR:No results for input(s): INR, INREXT in the last 72 hours. No lab exists for component: PROTIME  APTT:No results for input(s): APTT in the last 72 hours. LIVER PROFILE:  Recent Labs     01/29/23  0332   AST 59*   ALT 30     Lab Results   Component Value Date/Time    ALT (SGPT) 30 01/29/2023 03:32 AM    AST (SGOT) 59 (H) 01/29/2023 03:32 AM    Alk. phosphatase 84 01/29/2023 03:32 AM    Bilirubin, direct 3.9 (H) 01/29/2023 03:32 AM    Bilirubin, total 6.0 (H) 01/29/2023 03:32 AM       Imaging Last 24 Hours:  CT PELV W CONT    Result Date: 1/30/2023  EXAM: CT PELV W CONT INDICATION: Evaluate Left hip for acute OM COMPARISON: Pelvis and left hip radiographs 1/26/2023, MRI pelvis 5/3/2022. IV CONTRAST: 100 mL of Isovue-370. ORAL CONTRAST: None TECHNIQUE: Multislice helical CT of the pelvis was performed from the iliac crest to the symphysis pubis during uneventful rapid bolus intravenous contrast administration. Coronal and sagittal reformations were generated. CT dose reduction was achieved through use of a standardized protocol tailored for this examination and automatic exposure control for dose modulation. FINDINGS: Bowel: Partially imaged, within normal limits. Reproductive organs: Within normal limits.  Soft tissues: No pelvic mass or lymphadenopathy. Anasarca. Fluid: Small volume ascites. No drainable fluid collection. 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 Severe right and moderate left hip joint osteoarthritis. Moderate pubic symphysis arthropathy. Moderate bilateral sacroiliac joint osteoarthritis. Moderate to severe lower lumbosacral facet arthropathy. Large complex left hip effusion. Small complex right hip effusion. No aggressive osteolysis to suggest acute osteomyelitis. Miscellaneous: Atherosclerosis. No gallbladder wall thickening, nonspecific. 1.  No CT evidence of acute osteomyelitis. 2.  Chronic nonunited left femoral neck fracture. Adjacent moderate hip joint osteoarthritis with large complex joint effusion. 3.  Severe right hip joint osteophytosis with small complex joint effusion. 4.  Small volume ascites. Anasarca. Assessment//Plan   Active Problems:    Symptomatic anemia (1/22/2023)      Assessment:   (1/31/2023: Reconsult for consideration of capsule study due to ongoing anemia requiring transfusion. We were consulted recently and there was discussion regarding repeating EGD and doing colonoscopy but it was determined that her acute condition, comorbidities, and debility made the risks too great unless she were to start bleeding acutely. Her stool was heme negative at the time but was heme+ last week. Hgb down to 6.5 yesterday, 7.5 today. Has required transfusion twice in the last 2 days. She had an EGD 6/2022 with gastric and duodenal erosions. She has never had a colonoscopy. She is not on blood thinners. She denies abdominal pain although her abdomen was tender on exam, nausea, or vomiting. She has a good appetite. No overt signs of GIB per nurse report. ). Plan:    (- Continue PPI  - Follow H&H, transfuse prn  - Will discuss with Dr. Partha Jeffrey regarding timing of capsule study. ). Electronically signed by Chandler Welsh NP on 1/31/2023 at 1:50 PM    I have examined the patient.   I have reviewed the chart and agree with the documentation recorded by the ALEAH, including the assessment, treatment plan, and disposition. Patient seen and examined by me. I personally performed all components of the history, physical, and medical decision making and agree with the assessment and plan with minor modifications as noted. Exam:  Alert and awake. Obese,Pickwickian   HEENT AT  GI: soft,L   arge w/ normal bowel sounds   Latest Reference Range & Units 1/25/23 05:56 1/26/23 04:50 1/27/23 04:00 1/28/23 03:51 1/29/23 03:32 1/30/23 01:38 1/31/23 02:54 1/31/23 10:42   HGB 11.5 - 16.0 g/dL 8.0 (L) 8.4 (L) 8.1 (L) 8.0 (L) 7.7 (L) 7.0 (L) 6.5 (L) 7.5 (L)   HCT 35.0 - 47.0 % 28.5 (L) 28.8 (L) 27.6 (L) 27.0 (L) 25.8 (L) 23.6 (L) 22.2 (L) 25.0 (L)   (L): Data is abnormally low      Plan:   Tyler Canela is a 60 y.o.  female with PMH significant for chronic hypoxic respiratory failure on 4 L/min home oxygen therapy secondary to COPD, diabetes mellitus, essential hypertension, hyperlipidemia, JOSLYN compliant with CPAP, history  chronic anemia  who was admitted 1/21 with hemoglobin of 5. 3. She was seen then and anemia was not thought to be of GI origin. She is now heme+ and is requiring PRBC to keep her hgb optimized. We were asked to see her again for a possible SB Pillcam.     I spoke w/ Dr Elidia Bess/ hematology and Dr Quita Kevin. A SB Pillcam will only be diagnostic. She has a hx of gastric and duodenal erosions last year. Colonoscopy will be very difficult in her 2/2 her body habitus, current medical status and fractures noted    We will therefore proceed with a bedside EGD tomorrow, pending slot and anesthesia availability. Pt is agreeable to an EGD. Case /dw pt's ICU nurse as well. Riki Harris MD  [facility](A)

## 2023-01-31 NOTE — PROGRESS NOTES
1930: Bedside shift change report given to MARIE Anderson (oncoming nurse) by Doyle García RN (offgoing nurse). Report included the following information SBAR, Kardex, Intake/Output, MAR, Recent Results, and Cardiac Rhythm NSR .     2000: Shift assessment complete, see flowsheet for details. Patient is A&Ox4 and follows commands. Patient is NSR on the monitor. Patient's breath sounds are clear/diminished. 2200: Patient complaining of both arms hurting. Both of patients arms are very red and looks like she is breaking out in a rash. Rash is present on chest and some of back too. NP Caio Joseph notified and came to bedside to assess patient. NP placed order to give IV benadryl and continue to monitor. 0000: Reassessment complete, see flowsheet for details. No changes to previous assessment. 0400: Reassessment complete, see flowsheet for details. No changes to previous assesssment. 0530: Patient hemoglobin came back at 6.5. NP Caio Joseph notified, received orders to transfuse 1 unit PRBCs. Patient complaining of some itching at this time while zosyn is running. NP notified and was told to stop zosyn and give another dose of benadryl. 4873: Began transfusing one unit of PRBCs. Patient MAP has been less than 65 while patient has been sleeping, NP aware no interventions at this time. 0700: Bedside shift change report given to Carmelo Jimenez RN (oncoming nurse) by Saturnino Pinto RN (offgoing nurse). Report included the following information SBAR, Kardex, Intake/Output, MAR, Recent Results, and Cardiac Rhythm NSR .

## 2023-01-31 NOTE — PROGRESS NOTES
Interdisciplinary team rounds were held 1/31/2023 with the following team members:Care Management, Diabetes Treatment Specialist, Nursing, Nutrition, Pharmacy, Physician, and Clinical Coordinator and the patient. Plan of care discussed. See clinical pathway and/or care plan for interventions and desired outcomes. Goals of the Day: reconsult to GI re: Mario scan, monitor labs.  Ortho consult re: aspiration of fluid on hip joint

## 2023-01-31 NOTE — PROGRESS NOTES
Infectious Disease Progress      Impression    MSSA bacteremia persistent  Blood cultures 1/21+ for MSSA 1/1-RA  Blood cultures 1/25+ for MSSA 2/4-L/hand  Blood cultures 1/26+ for MSSA 1/4- L/hand  Negative cultures 1/27, 1/29  Echocardiogram 1/25-negative for vegetation, poor study  Per report. Consider YANNA      Left hip pain   X-ray -di splaced, rotated, left femoral neck fracture  CT of pelvis ordered. Report as follows:  Chronic nonunited left femoral neck fracture. Adjacent moderate hip joint  osteoarthritis with large complex joint effusion. Severe right hip joint osteophytosis with small complex joint effusion. L /femoral neck fracture, chronic  MRI of  hip -5/3/2022 +for left femoral neck displaced fracture with fluid in the fracture gap s.  ?  Healing fracture with possible superimposed osteomyelitis/osteonecrosis. S/p Fluid aspiration by IR on 5/5/22  Cultures-NG. No organisms on GS. Recommendation was for antibiotics iv. Patient was initially on on daptomycin and cefepime iv with plans for 6 weeks of therapy. Patient was readmitted in June2022 . Antibiotic therapy was completed with Zosyn IV. S/p intense macular rash on chest, upper abdomen &   extremities  ? Zosyn vs CT contrast or both   D/w Pharmacy, consider change to Vancomycin/ Daptomycin      Acute on chronic respiratory failure  On BiPAP, O2 by [facility] ICU team     S/p episode of V. Tach on 1/29   2ry to severe hypokalemia  Currently on low-dose pressors  Cardiology following      Chronic anemia  S/p Hb 5.3 on admission  Folate, iron deficiency  Hematology following. R/  big toe infection s/p nail avulsion  Wound is clean  Continue Daily wound care    Morbid obesity  BMI 51.55     Plan  S/p DC of Zosyn IV, on Nafcillin IV  Monitor closely for reaction, if reaction with Nafcillin change to Vancomycin .  Daptomycin would be safer for the   Long duration  given  previous Cr elevations   Patient will require total of 6 weeks of MSSA therapy  IV  Repeat blood cultures x2 pending  Respiratory cultures  Aspiration precautions  If poor visualization of valves on echo, recommend YANNA   Ortho to review CT pelvis-? Aspiration of fluid in left hip. Og Millard is a 60 y.o.  female with PMH significant for chronic hypoxic respiratory failure on 4 L/min home oxygen therapy secondary to COPD, diabetes mellitus, essential hypertension, hyperlipidemia, JOSLYN compliant with CPAP, history duodenal ulcers, chronic anemia of indeterminate etiology who was admitted 1/21 with hemoglobin of 5.3. Patient is known to ID service from previous admission back in May and June /2022. Patient had chronic displaced left femoral  neck fracture. MRI of left hip showed left femoral neck displaced fracture with fluid in the fracture gap and edema/enhancement and surrounding marrow and soft tissues. Findings may reflect a healing fracture with possible superimposed osteomyelitis/osteonecrosis. S/p Fluid aspiration by IR on 5/5  Cultures-NG. No organisms on GS. No plans for Surgery per Ortho. Recommendation was for antibiotics iv. Patient was placed on daptomycin and cefepime   X 6 weeks with end date 6/16. She was readmitted in June and changed to Zosyn IV during that admission. Patient completed 6 weeks of antibiotic therapy  Patient was initially admitted to the hospitalist service on this admission. Thereafter admitted to ICU. Patient has been requiring progressively high concentrations of O2. Patient was placed on BiPAP and transferred to ICU. Patient had episode of hypotension on 1/26  And has been on pressors. Patient had episode of V. tach on 1/29. Severe hypokalemia was because of arrhythmia per cardiology  Blood cultures have been positive for MSSA. Blood cultures on 1/21+ for MSSA 1/1-RA blood cultures on 1/25+ for MSSA 2/4 left hand, 1/26 1/4. Negative cultures on 1/27, 1/29. Echocardiogram on 1/25 negative for vegetation.   Poor image quality. Patient was on nafcillin IV. Changed to Zosyn IV on 1/ 29 due to concern for pulmonary infiltrates and persistent hypotension. ID service has been consulted. Patient seen today. Resting comfortably in ICU. On  6 liters oxygen  Pain in left hip. D/w Dr Susan Hashimoto. D/w RN events of the day . D/w Pharmacy. Active Hospital Problems    Diagnosis Date Noted    Symptomatic anemia 01/22/2023         Past Medical History:   Diagnosis Date    Acute on chronic respiratory failure with hypoxia and hypercapnia ([facility]) 11/20/2020    Atrial fibrillation ([facility] [address] 11/4/2020    CHF (congestive heart failure) ([facility])     Diabetes ([facility])     Hypertension     Ill-defined condition     high cholesterol    Ill-defined condition     tachycardia    JOSLYN (obstructive sleep apnea) 11/20/2020    Other and unspecified symptoms and signs involving general sensations and perceptions     ruptured disc    S/P cardiac cath 11/4/2020    11/3/2020 nonobstructive disease       Past Surgical History:   Procedure Laterality Date    WV COMPRE ELECTROPHYSIOL XM W/LEFT ATRIAL PACNG/REC N/A 11/23/2020    Lt Atrial Pace & Record During Ep Study performed by Adilia Nair MD at [facility] CARDIAC CATH LAB    WV COMPRE EP EVAL ABLTJ 3D MAPG TX SVT N/A 11/23/2020    ABLATION A-FLUTTER performed by Adilia Nair MD at [facility]. ADD ON N/A 11/23/2020    Ablation Svt/Vt Add On performed by Adilia Nair MD at [facility] CARDIAC CATH LAB    WV INTRACARDIAC ELECTROPHYSIOLOGIC 3D MAPPING N/A 11/23/2020    Ep 3d Mapping performed by Adilia Nair MD at [facility] CARDIAC CATH LAB    [place] STIM/PACING HEART POST IV DRUG INFU N/A 11/23/2020    Drug Stimulation performed by Adilia Nair MD at [facility] CARDIAC CATH LAB       Allergies   Allergen Reactions    Cefepime Rash     tolerated nafcillin 1/2023    Cefazolin Rash    Other Plant, Animal, Environmental Rash     Ivory soap caused rash on hands.     Zosyn [Piperacillin-Tazobactam] Rash     Possible rash to zosyn, also received IV contrast and blood  Tolerated nafcillin before/after 1/2023       Social Connections: Not on file       No family status information on file. Medication Documentation Review Audit       Reviewed by Komal Suh RN (Registered Nurse) on 01/22/23 at 1007      Medication Sig Documenting Provider Last Dose Status Taking?   acetaminophen (TYLENOL) 325 mg tablet Take 650 mg by mouth every six (6) hours as needed for Pain. Provider, Historical  Active    albuterol-ipratropium (DUO-NEB) 2.5 mg-0.5 mg/3 ml nebu 3 mL by Nebulization route every four (4) hours as needed for Wheezing, Shortness of Breath or Respiratory Distress. Anita Nye MD  Active    amiodarone (CORDARONE) 200 mg tablet Take  by mouth. Provider, Historical 1/21/2023 Active Yes   atorvastatin (LIPITOR) 10 mg tablet Take 1 Tablet by mouth nightly. START AFTER COMPLETING ANTIBIOTICS TREATMENT ON 6/17 Salbador Armijo MD 1/21/2023 Active Yes   clotrimazole-betamethasone (LOTRISONE) topical cream Apply at affected area Rubi Castelan MD  Active    furosemide (LASIX) 20 mg tablet Take  by mouth daily. Provider, Historical 1/21/2023 Active Yes   metoprolol (LOPRESSOR) 25 mg tablet Take 25 mg by mouth two (2) times a day. Russ Cano MD 1/21/2023 Active Yes   pantoprazole (PROTONIX) 40 mg tablet Take 1 Tablet by mouth Daily (before breakfast). Patient not taking: Reported on 1/22/2023    Rubi Castelan MD Not Taking Active No   rivaroxaban (Xarelto) 20 mg tab tablet Take  by mouth daily. Provider, Historical 1/21/2023 Active Yes                      Review of Systems - Negative except those mentioned in H&P      PHYSICAL EXAM:  General:          Resting, arousable, cooperative, no acute distress    EENT:              EOMI. Anicteric sclerae. MMM  Resp:               CTA bilaterally, no wheezing or rales.   No accessory muscle use  CV:                  Regular  rhythm, No edema  GI:                   Soft, Non distended, Non tender. +Bowel sounds  Neurologic:      Alert and oriented X 3, normal speech,   Psych:             Good insight. Not anxious nor agitated  Skin:                Intense erythematous rash on chest, upper abd & UEExtremities     Tender over L/hip, mild warmth+ R/hip -nontender.     MD Demar Stanley Potter

## 2023-02-01 ENCOUNTER — HOSPITAL ENCOUNTER (INPATIENT)
Dept: GENERAL RADIOLOGY | Age: 63
Discharge: HOME | End: 2023-02-01
Attending: INTERNAL MEDICINE
Payer: MEDICARE

## 2023-02-01 ENCOUNTER — ANESTHESIA (OUTPATIENT)
Dept: ENDOSCOPY | Age: 63
DRG: 811 | End: 2023-02-01
Payer: MEDICARE

## 2023-02-01 ENCOUNTER — ANESTHESIA EVENT (OUTPATIENT)
Dept: ENDOSCOPY | Age: 63
DRG: 811 | End: 2023-02-01
Payer: MEDICARE

## 2023-02-01 VITALS
SYSTOLIC BLOOD PRESSURE: 100 MMHG | DIASTOLIC BLOOD PRESSURE: 51 MMHG | OXYGEN SATURATION: 93 % | RESPIRATION RATE: 28 BRPM | HEART RATE: 81 BPM

## 2023-02-01 LAB
ABO + RH BLD: NORMAL
ANION GAP SERPL CALC-SCNC: 6 MMOL/L (ref 5–15)
APTT PPP: 29.9 SEC (ref 22.1–31)
BACTERIA SPEC CULT: ABNORMAL
BACTERIA SPEC CULT: ABNORMAL
BLD PROD TYP BPU: NORMAL
BLD PROD TYP BPU: NORMAL
BLOOD GROUP ANTIBODIES SERPL: NORMAL
BPU ID: NORMAL
BPU ID: NORMAL
BUN SERPL-MCNC: 12 MG/DL (ref 6–20)
BUN/CREAT SERPL: 15 (ref 12–20)
CALCIUM SERPL-MCNC: 8 MG/DL (ref 8.5–10.1)
CHLORIDE SERPL-SCNC: 98 MMOL/L (ref 97–108)
CO2 SERPL-SCNC: 34 MMOL/L (ref 21–32)
CREAT SERPL-MCNC: 0.78 MG/DL (ref 0.55–1.02)
CROSSMATCH RESULT,%XM: NORMAL
CROSSMATCH RESULT,%XM: NORMAL
ERYTHROCYTE [DISTWIDTH] IN BLOOD BY AUTOMATED COUNT: 23 % (ref 11.5–14.5)
GLUCOSE BLD STRIP.AUTO-MCNC: 106 MG/DL (ref 65–117)
GLUCOSE BLD STRIP.AUTO-MCNC: 113 MG/DL (ref 65–117)
GLUCOSE BLD STRIP.AUTO-MCNC: 93 MG/DL (ref 65–117)
GLUCOSE SERPL-MCNC: 83 MG/DL (ref 65–100)
HCT VFR BLD AUTO: 27.8 % (ref 35–47)
HGB BLD-MCNC: 8.3 G/DL (ref 11.5–16)
INR PPP: 1.1 (ref 0.9–1.1)
MAGNESIUM SERPL-MCNC: 2.3 MG/DL (ref 1.6–2.4)
MCH RBC QN AUTO: 29.1 PG (ref 26–34)
MCHC RBC AUTO-ENTMCNC: 29.9 G/DL (ref 30–36.5)
MCV RBC AUTO: 97.5 FL (ref 80–99)
NRBC # BLD: 0.08 K/UL (ref 0–0.01)
NRBC BLD-RTO: 0.7 PER 100 WBC
PHOSPHATE SERPL-MCNC: 2.1 MG/DL (ref 2.6–4.7)
PLATELET # BLD AUTO: 115 K/UL (ref 150–400)
PMV BLD AUTO: 12.4 FL (ref 8.9–12.9)
POTASSIUM SERPL-SCNC: 3.6 MMOL/L (ref 3.5–5.1)
PROTHROMBIN TIME: 11.9 SEC (ref 9–11.1)
RBC # BLD AUTO: 2.85 M/UL (ref 3.8–5.2)
SERVICE CMNT-IMP: ABNORMAL
SERVICE CMNT-IMP: NORMAL
SODIUM SERPL-SCNC: 138 MMOL/L (ref 136–145)
SPECIMEN EXP DATE BLD: NORMAL
STATUS OF UNIT,%ST: NORMAL
STATUS OF UNIT,%ST: NORMAL
THERAPEUTIC RANGE,PTTT: NORMAL SECS (ref 58–77)
UNIT DIVISION, %UDIV: 0
UNIT DIVISION, %UDIV: 0
WBC # BLD AUTO: 11.2 K/UL (ref 3.6–11)

## 2023-02-01 PROCEDURE — 76040000019: Performed by: INTERNAL MEDICINE

## 2023-02-01 PROCEDURE — 77030019988 HC FCPS ENDOSC DISP BSC -B: Performed by: INTERNAL MEDICINE

## 2023-02-01 PROCEDURE — 88305 TISSUE EXAM BY PATHOLOGIST: CPT

## 2023-02-01 PROCEDURE — 74011000250 HC RX REV CODE- 250: Performed by: STUDENT IN AN ORGANIZED HEALTH CARE EDUCATION/TRAINING PROGRAM

## 2023-02-01 PROCEDURE — 74011250637 HC RX REV CODE- 250/637: Performed by: STUDENT IN AN ORGANIZED HEALTH CARE EDUCATION/TRAINING PROGRAM

## 2023-02-01 PROCEDURE — 2709999900 HC NON-CHARGEABLE SUPPLY: Performed by: INTERNAL MEDICINE

## 2023-02-01 PROCEDURE — 84100 ASSAY OF PHOSPHORUS: CPT

## 2023-02-01 PROCEDURE — 0DB68ZX EXCISION OF STOMACH, VIA NATURAL OR ARTIFICIAL OPENING ENDOSCOPIC, DIAGNOSTIC: ICD-10-PCS | Performed by: INTERNAL MEDICINE

## 2023-02-01 PROCEDURE — 36415 COLL VENOUS BLD VENIPUNCTURE: CPT

## 2023-02-01 PROCEDURE — 74011000258 HC RX REV CODE- 258: Performed by: INTERNAL MEDICINE

## 2023-02-01 PROCEDURE — 74011250636 HC RX REV CODE- 250/636: Performed by: NURSE ANESTHETIST, CERTIFIED REGISTERED

## 2023-02-01 PROCEDURE — 74011250636 HC RX REV CODE- 250/636: Performed by: INTERNAL MEDICINE

## 2023-02-01 PROCEDURE — 74011250637 HC RX REV CODE- 250/637: Performed by: INTERNAL MEDICINE

## 2023-02-01 PROCEDURE — 74011000250 HC RX REV CODE- 250: Performed by: INTERNAL MEDICINE

## 2023-02-01 PROCEDURE — C9399 UNCLASSIFIED DRUGS OR BIOLOG: HCPCS | Performed by: PHYSICIAN ASSISTANT

## 2023-02-01 PROCEDURE — 74011000250 HC RX REV CODE- 250: Performed by: NURSE ANESTHETIST, CERTIFIED REGISTERED

## 2023-02-01 PROCEDURE — 74011250636 HC RX REV CODE- 250/636: Performed by: PHYSICIAN ASSISTANT

## 2023-02-01 PROCEDURE — 65270000046 HC RM TELEMETRY

## 2023-02-01 PROCEDURE — 83735 ASSAY OF MAGNESIUM: CPT

## 2023-02-01 PROCEDURE — 85730 THROMBOPLASTIN TIME PARTIAL: CPT

## 2023-02-01 PROCEDURE — 76060000031 HC ANESTHESIA FIRST 0.5 HR: Performed by: INTERNAL MEDICINE

## 2023-02-01 PROCEDURE — 94660 CPAP INITIATION&MGMT: CPT

## 2023-02-01 PROCEDURE — 99233 SBSQ HOSP IP/OBS HIGH 50: CPT | Performed by: INTERNAL MEDICINE

## 2023-02-01 PROCEDURE — 85610 PROTHROMBIN TIME: CPT

## 2023-02-01 PROCEDURE — 94640 AIRWAY INHALATION TREATMENT: CPT

## 2023-02-01 PROCEDURE — 80048 BASIC METABOLIC PNL TOTAL CA: CPT

## 2023-02-01 PROCEDURE — 77030039825 HC MSK NSL PAP SUPERNO2VA VYRM -B: Performed by: ANESTHESIOLOGY

## 2023-02-01 PROCEDURE — 0DB98ZX EXCISION OF DUODENUM, VIA NATURAL OR ARTIFICIAL OPENING ENDOSCOPIC, DIAGNOSTIC: ICD-10-PCS | Performed by: INTERNAL MEDICINE

## 2023-02-01 PROCEDURE — 82962 GLUCOSE BLOOD TEST: CPT

## 2023-02-01 PROCEDURE — 74011000250 HC RX REV CODE- 250: Performed by: PHYSICIAN ASSISTANT

## 2023-02-01 PROCEDURE — 85027 COMPLETE CBC AUTOMATED: CPT

## 2023-02-01 RX ORDER — LIDOCAINE HYDROCHLORIDE 20 MG/ML
INJECTION, SOLUTION EPIDURAL; INFILTRATION; INTRACAUDAL; PERINEURAL AS NEEDED
Status: DISCONTINUED | OUTPATIENT
Start: 2023-02-01 | End: 2023-02-01 | Stop reason: HOSPADM

## 2023-02-01 RX ORDER — IPRATROPIUM BROMIDE AND ALBUTEROL SULFATE 2.5; .5 MG/3ML; MG/3ML
3 SOLUTION RESPIRATORY (INHALATION)
Status: DISCONTINUED | OUTPATIENT
Start: 2023-02-01 | End: 2023-02-10

## 2023-02-01 RX ORDER — SODIUM CHLORIDE 9 MG/ML
75 INJECTION, SOLUTION INTRAVENOUS CONTINUOUS
Status: DISCONTINUED | OUTPATIENT
Start: 2023-02-01 | End: 2023-02-01

## 2023-02-01 RX ORDER — FLUMAZENIL 0.1 MG/ML
0.2 INJECTION INTRAVENOUS
Status: DISCONTINUED | OUTPATIENT
Start: 2023-02-01 | End: 2023-02-01

## 2023-02-01 RX ORDER — NALOXONE HYDROCHLORIDE 0.4 MG/ML
0.4 INJECTION, SOLUTION INTRAMUSCULAR; INTRAVENOUS; SUBCUTANEOUS
Status: DISCONTINUED | OUTPATIENT
Start: 2023-02-01 | End: 2023-02-01

## 2023-02-01 RX ORDER — OXYCODONE AND ACETAMINOPHEN 5; 325 MG/1; MG/1
1 TABLET ORAL
Status: DISCONTINUED | OUTPATIENT
Start: 2023-02-01 | End: 2023-02-09

## 2023-02-01 RX ORDER — MIDAZOLAM HYDROCHLORIDE 1 MG/ML
.25-5 INJECTION, SOLUTION INTRAMUSCULAR; INTRAVENOUS
Status: DISCONTINUED | OUTPATIENT
Start: 2023-02-01 | End: 2023-02-01

## 2023-02-01 RX ORDER — LIDOCAINE HYDROCHLORIDE 10 MG/ML
20 INJECTION INFILTRATION; PERINEURAL
Status: DISPENSED | OUTPATIENT
Start: 2023-02-01 | End: 2023-02-01

## 2023-02-01 RX ORDER — PROPOFOL 10 MG/ML
INJECTION, EMULSION INTRAVENOUS AS NEEDED
Status: DISCONTINUED | OUTPATIENT
Start: 2023-02-01 | End: 2023-02-01 | Stop reason: HOSPADM

## 2023-02-01 RX ORDER — LIDOCAINE HYDROCHLORIDE 10 MG/ML
10 INJECTION INFILTRATION; PERINEURAL
Status: ACTIVE | OUTPATIENT
Start: 2023-02-01 | End: 2023-02-01

## 2023-02-01 RX ORDER — EPINEPHRINE 0.1 MG/ML
1 INJECTION INTRACARDIAC; INTRAVENOUS
Status: DISCONTINUED | OUTPATIENT
Start: 2023-02-01 | End: 2023-02-01

## 2023-02-01 RX ORDER — SODIUM CHLORIDE 0.9 % (FLUSH) 0.9 %
5-40 SYRINGE (ML) INJECTION EVERY 8 HOURS
Status: DISCONTINUED | OUTPATIENT
Start: 2023-02-01 | End: 2023-02-01

## 2023-02-01 RX ORDER — SODIUM CHLORIDE 0.9 % (FLUSH) 0.9 %
5-40 SYRINGE (ML) INJECTION AS NEEDED
Status: DISCONTINUED | OUTPATIENT
Start: 2023-02-01 | End: 2023-02-01

## 2023-02-01 RX ORDER — ATROPINE SULFATE 0.1 MG/ML
0.5 INJECTION INTRAVENOUS
Status: DISCONTINUED | OUTPATIENT
Start: 2023-02-01 | End: 2023-02-01

## 2023-02-01 RX ORDER — SODIUM CHLORIDE 9 MG/ML
INJECTION, SOLUTION INTRAVENOUS
Status: DISCONTINUED | OUTPATIENT
Start: 2023-02-01 | End: 2023-02-01 | Stop reason: HOSPADM

## 2023-02-01 RX ORDER — DEXTROMETHORPHAN/PSEUDOEPHED 2.5-7.5/.8
1.2 DROPS ORAL
Status: DISCONTINUED | OUTPATIENT
Start: 2023-02-01 | End: 2023-02-01

## 2023-02-01 RX ADMIN — MIDODRINE HYDROCHLORIDE 10 MG: 5 TABLET ORAL at 16:39

## 2023-02-01 RX ADMIN — IPRATROPIUM BROMIDE AND ALBUTEROL SULFATE 3 ML: 2.5; .5 SOLUTION RESPIRATORY (INHALATION) at 19:12

## 2023-02-01 RX ADMIN — SODIUM CHLORIDE, PRESERVATIVE FREE 10 ML: 5 INJECTION INTRAVENOUS at 22:19

## 2023-02-01 RX ADMIN — NAFCILLIN SODIUM 2 G: 2 INJECTION, POWDER, LYOPHILIZED, FOR SOLUTION INTRAMUSCULAR; INTRAVENOUS at 04:46

## 2023-02-01 RX ADMIN — ALBUMIN HUMAN 12.5 G: 0.25 SOLUTION INTRAVENOUS at 02:24

## 2023-02-01 RX ADMIN — IPRATROPIUM BROMIDE AND ALBUTEROL SULFATE 3 ML: 2.5; .5 SOLUTION RESPIRATORY (INHALATION) at 14:14

## 2023-02-01 RX ADMIN — CASTOR OIL AND BALSAM, PERU: 788; 87 OINTMENT TOPICAL at 09:09

## 2023-02-01 RX ADMIN — ALBUMIN HUMAN 12.5 G: 0.25 SOLUTION INTRAVENOUS at 17:45

## 2023-02-01 RX ADMIN — FUROSEMIDE 40 MG: 40 TABLET ORAL at 14:14

## 2023-02-01 RX ADMIN — SODIUM CHLORIDE, PRESERVATIVE FREE 10 ML: 5 INJECTION INTRAVENOUS at 05:31

## 2023-02-01 RX ADMIN — POTASSIUM BICARBONATE 20 MEQ: 391 TABLET, EFFERVESCENT ORAL at 14:14

## 2023-02-01 RX ADMIN — Medication 1 AMPULE: at 22:18

## 2023-02-01 RX ADMIN — SODIUM CHLORIDE 75 ML/HR: 9 INJECTION, SOLUTION INTRAVENOUS at 12:53

## 2023-02-01 RX ADMIN — ATORVASTATIN CALCIUM 10 MG: 10 TABLET, FILM COATED ORAL at 22:19

## 2023-02-01 RX ADMIN — VANCOMYCIN HYDROCHLORIDE 2500 MG: 10 INJECTION, POWDER, LYOPHILIZED, FOR SOLUTION INTRAVENOUS at 13:35

## 2023-02-01 RX ADMIN — PROPOFOL 80 MG: 10 INJECTION, EMULSION INTRAVENOUS at 13:11

## 2023-02-01 RX ADMIN — LIDOCAINE HYDROCHLORIDE 100 MG: 20 INJECTION, SOLUTION EPIDURAL; INFILTRATION; INTRACAUDAL; PERINEURAL at 13:11

## 2023-02-01 RX ADMIN — MIDODRINE HYDROCHLORIDE 10 MG: 5 TABLET ORAL at 13:45

## 2023-02-01 RX ADMIN — SODIUM CHLORIDE: 0.9 INJECTION, SOLUTION INTRAVENOUS at 13:06

## 2023-02-01 RX ADMIN — IPRATROPIUM BROMIDE AND ALBUTEROL SULFATE 3 ML: 2.5; .5 SOLUTION RESPIRATORY (INHALATION) at 01:51

## 2023-02-01 RX ADMIN — DIPHENHYDRAMINE HYDROCHLORIDE 25 MG: 25 CAPSULE ORAL at 00:32

## 2023-02-01 RX ADMIN — Medication 1 AMPULE: at 09:07

## 2023-02-01 RX ADMIN — SODIUM CHLORIDE, PRESERVATIVE FREE 10 ML: 5 INJECTION INTRAVENOUS at 13:57

## 2023-02-01 RX ADMIN — PANTOPRAZOLE SODIUM 40 MG: 40 TABLET, DELAYED RELEASE ORAL at 15:31

## 2023-02-01 RX ADMIN — IPRATROPIUM BROMIDE AND ALBUTEROL SULFATE 3 ML: 2.5; .5 SOLUTION RESPIRATORY (INHALATION) at 07:30

## 2023-02-01 RX ADMIN — MIDODRINE HYDROCHLORIDE 10 MG: 5 TABLET ORAL at 08:33

## 2023-02-01 RX ADMIN — ALBUMIN HUMAN 12.5 G: 0.25 SOLUTION INTRAVENOUS at 05:31

## 2023-02-01 RX ADMIN — FOLIC ACID: 5 INJECTION, SOLUTION INTRAMUSCULAR; INTRAVENOUS; SUBCUTANEOUS at 09:18

## 2023-02-01 RX ADMIN — PANTOPRAZOLE SODIUM 40 MG: 40 TABLET, DELAYED RELEASE ORAL at 08:33

## 2023-02-01 RX ADMIN — CASTOR OIL AND BALSAM, PERU: 788; 87 OINTMENT TOPICAL at 22:18

## 2023-02-01 RX ADMIN — NAFCILLIN SODIUM 2 G: 2 INJECTION, POWDER, LYOPHILIZED, FOR SOLUTION INTRAMUSCULAR; INTRAVENOUS at 09:23

## 2023-02-01 RX ADMIN — NAFCILLIN SODIUM 2 G: 2 INJECTION, POWDER, LYOPHILIZED, FOR SOLUTION INTRAMUSCULAR; INTRAVENOUS at 01:04

## 2023-02-01 RX ADMIN — ALBUMIN HUMAN 12.5 G: 0.25 SOLUTION INTRAVENOUS at 12:27

## 2023-02-01 NOTE — PERIOP NOTES
Endoscopy Case End Note:    1319:  Procedure scope was pre-cleaned, per protocol, at bedside by Debi Gonzalez.      1320:  Report received from anesthesia - [address] [place] [address]. See anesthesia flowsheet for intra-procedure vital signs and events.

## 2023-02-01 NOTE — PROGRESS NOTES
Pharmacy Automatic Renal Dosing Protocol - Antimicrobials    Indication for Antimicrobials: bacteremia     Current Regimen of Each Antimicrobial:   Vancomycin - pharmacy to dose; started ; day 1     Previous Antimicrobial Therapy:   nafcillin -  zosyn -  Nafcillin -    Goal Level: VANCOMYCIN TROUGH GOAL RANGE    Vancomycin Trough: 15 - 20 mcg/mL  (AUC: 400 - 600 mg/hr/Liter/day)     Date Dose & Interval Measured (mcg/mL) Extrapolated (mcg/mL)                       Significant Cultures:    blood cx MSSA   blood cx MSSA   blood cx MSSA   paired blood cx: NGTD, pending    blood cx NGTD pending   paired blood cx: NGTD, pending       Paralysis, amputations, malnutrition: -    Labs:  Recent Labs     23  0524 23  0330 23  1042 23  0254 23  2213   CREA 0.78  --   --  0.89 0.99   BUN 12  --   --  16 18   WBC  --  11.2* 11.1* 11.1*  --      Temp (24hrs), Av.8 °F (36.6 °C), Min:97.4 °F (36.3 °C), Max:98.2 °F (36.8 °C)    Creatinine Clearance (mL/min) or Dialysis: 61.1 mL/min (IBW)    Impression/Plan:   Vancomycin 2500 mg IV x1 loading dose then 1000 mg IV q12h  Anticipated   Antimicrobial stop date pending     Pharmacy will follow daily and adjust medications as appropriate for renal function and/or serum levels.     Thank you,  HIRAL García

## 2023-02-01 NOTE — PROGRESS NOTES
Infectious Disease Progress      Impression    MSSA bacteremia persistent  Blood cultures 1/21+ for MSSA 1/1-RA  Blood cultures 1/25+ for MSSA 2/4-L/hand  Blood cultures 1/26+ for MSSA 1/4- L/hand  Negative cultures 1/27, 1/29  Echocardiogram 1/25-negative for vegetation, poor study  Per report. Consider YANNA. Left hip pain   X-ray -di splaced, rotated, left femoral neck fracture  CT of pelvis ordered. Report as follows:  Chronic nonunited left femoral neck fracture. Adjacent moderate hip joint  osteoarthritis with large complex joint effusion. Severe right hip joint osteophytosis with small complex joint effusion. D/w Ortho regarding aspiration, deferred to IR  S/p attempted aspiration by IR today  unsuccessful. L /femoral neck fracture, chronic  MRI of  hip -5/3/2022 +for left femoral neck displaced fracture with fluid in the fracture gap s.  ?  Healing fracture with possible superimposed osteomyelitis/osteonecrosis. S/p Fluid aspiration by IR on 5/5/22  Cultures-NG. No organisms on GS. Recommendation was for antibiotics iv. Patient was initially on on daptomycin and cefepime iv with plans for 6 weeks of therapy. Patient was readmitted in June2022 . Antibiotic therapy was completed with Zosyn IV. Macular rash   on chest, upper abdomen &   extremities  ? Zosyn vs CT contrast or both  Persistent with Nafcillin IV, will change to Vancomycin or Daptomycin        Acute on chronic respiratory failure  On BiPAP, O2 by [facility] ICU team     S/p episode of V. Tach on 1/29   2ry to severe hypokalemia  Currently on low-dose pressors  Cardiology following      Chronic anemia  S/p Hb 5.3 on admission  Folate, iron deficiency  Hematology following.     R/  big toe infection s/p nail avulsion  Wound is clean  Continue Daily wound care    Morbid obesity  BMI 51.55     Plan  DC Nafcillin IV  Start  Vancomycin   Pharmacy to dose per Cr clearance  Monitor Cr , daily probiotic  Daptomycin would be safer for the long duration given  previous Cr elevations   Patient will require total of 6 weeks of MSSA therapy  IV  YANNA-If poor visualization of valves on echo  Aspiration precautions. Dayanara Chin is a 60 y.o.  female with PMH significant for chronic hypoxic respiratory failure on 4 L/min home oxygen therapy secondary to COPD, diabetes mellitus, essential hypertension, hyperlipidemia, JOSLYN compliant with CPAP, history duodenal ulcers, chronic anemia of indeterminate etiology who was admitted 1/21 with hemoglobin of 5.3. Patient is known to ID service from previous admission back in May and June /2022. Patient had chronic displaced left femoral  neck fracture. MRI of left hip showed left femoral neck displaced fracture with fluid in the fracture gap and edema/enhancement and surrounding marrow and soft tissues. Findings may reflect a healing fracture with possible superimposed osteomyelitis/osteonecrosis. S/p Fluid aspiration by IR on 5/5  Cultures-NG. No organisms on GS. No plans for Surgery per Ortho. Recommendation was for antibiotics iv. Patient was placed on daptomycin and cefepime   X 6 weeks with end date 6/16. She was readmitted in June and changed to Zosyn IV during that admission. Patient completed 6 weeks of antibiotic therapy  Patient was initially admitted to the hospitalist service on this admission. Thereafter admitted to ICU. Patient has been requiring progressively high concentrations of O2. Patient was placed on BiPAP and transferred to ICU. Patient had episode of hypotension on 1/26  And has been on pressors. Patient had episode of V. tach on 1/29. Severe hypokalemia was because of arrhythmia per cardiology  Blood cultures have been positive for MSSA. Blood cultures on 1/21+ for MSSA 1/1-RA blood cultures on 1/25+ for MSSA 2/4 left hand, 1/26 1/4. Negative cultures on 1/27, 1/29. Echocardiogram on 1/25 negative for vegetation. Poor image quality.   Patient was on nafcillin IV.  Changed to Zosyn IV on 1/ 29 due to concern for pulmonary infiltrates and persistent hypotension. ID service has been consulted. Patient seen today. S/p attempted aspiration by IR  Intense macular rash+  Pain in left hip less. Denies cough  D/w RN events of the day . D/w Pharmacy. Active Hospital Problems    Diagnosis Date Noted    Symptomatic anemia 01/22/2023         Past Medical History:   Diagnosis Date    Acute on chronic respiratory failure with hypoxia and hypercapnia ([facility]) 11/20/2020    Atrial fibrillation ([facility] [address] 11/4/2020    CHF (congestive heart failure) ([facility])     Diabetes ([facility])     Hypertension     Ill-defined condition     high cholesterol    Ill-defined condition     tachycardia    JOSLYN (obstructive sleep apnea) 11/20/2020    Other and unspecified symptoms and signs involving general sensations and perceptions     ruptured disc    S/P cardiac cath 11/4/2020    11/3/2020 nonobstructive disease       Past Surgical History:   Procedure Laterality Date    [place] COMPRE ELECTROPHYSIOL XM W/LEFT ATRIAL PACNG/REC N/A 11/23/2020    Lt Atrial Pace & Record During Ep Study performed by Justino Huerta MD at [facility] CARDIAC CATH LAB    [place] COMPRE EP EVAL ABLTJ 3D MAPG TX SVT N/A 11/23/2020    ABLATION A-FLUTTER performed by Justino Huerta MD at [facility]. ADD ON N/A 11/23/2020    Ablation Svt/Vt Add On performed by Justino Huerta MD at [facility] CARDIAC CATH LAB    [place] INTRACARDIAC ELECTROPHYSIOLOGIC 3D MAPPING N/A 11/23/2020    Ep 3d Mapping performed by Justino Huerta MD at [facility] CARDIAC CATH LAB    [place] PROGRAMMED STIMJ & PACG AFTER IV DRUG NFS N/A 11/23/2020    Drug Stimulation performed by Justino Huerta MD at [facility] CARDIAC CATH LAB       Allergies   Allergen Reactions    Cefepime Rash     tolerated nafcillin 1/2023    Cefazolin Rash    Other Plant, Animal, Environmental Rash     Ivory soap caused rash on hands.     Zosyn [Piperacillin-Tazobactam] Rash     Possible rash to zosyn, also received IV contrast and blood  Tolerated nafcillin before/after 1/2023       Social Connections: Not on file       No family status information on file. Medication Documentation Review Audit       Reviewed by Yaya Vasquez RN (Registered Nurse) on 01/22/23 at 1007      Medication Sig Documenting Provider Last Dose Status Taking?   acetaminophen (TYLENOL) 325 mg tablet Take 650 mg by mouth every six (6) hours as needed for Pain. Provider, Historical  Active    albuterol-ipratropium (DUO-NEB) 2.5 mg-0.5 mg/3 ml nebu 3 mL by Nebulization route every four (4) hours as needed for Wheezing, Shortness of Breath or Respiratory Distress. Girish Loco MD  Active    amiodarone (CORDARONE) 200 mg tablet Take  by mouth. Provider, Historical 1/21/2023 Active Yes   atorvastatin (LIPITOR) 10 mg tablet Take 1 Tablet by mouth nightly. START AFTER COMPLETING ANTIBIOTICS TREATMENT ON 6/17 Waqas Booth MD 1/21/2023 Active Yes   clotrimazole-betamethasone (LOTRISONE) topical cream Apply at affected area Marek Lr MD  Active    furosemide (LASIX) 20 mg tablet Take  by mouth daily. Provider, Historical 1/21/2023 Active Yes   metoprolol (LOPRESSOR) 25 mg tablet Take 25 mg by mouth two (2) times a day. Russ Cano MD 1/21/2023 Active Yes   pantoprazole (PROTONIX) 40 mg tablet Take 1 Tablet by mouth Daily (before breakfast). Patient not taking: Reported on 1/22/2023    Marek Lr MD Not Taking Active No   rivaroxaban (Xarelto) 20 mg tab tablet Take  by mouth daily. Provider, Historical 1/21/2023 Active Yes                      Review of Systems - Negative except those mentioned in H&P      PHYSICAL EXAM:  General:          Resting, arousable, cooperative, no acute distress    EENT:              EOMI. Anicteric sclerae. MMM  Resp:               CTA bilaterally, no wheezing or rales.   No accessory muscle use  CV:                  Regular  rhythm,  No edema  GI: Soft, Non distended, Non tender. +Bowel sounds  Neurologic:      Alert and oriented X 3, normal speech,   Psych:             Good insight. Not anxious nor agitated  Skin:                Intense erythematous rash on chest, upper abd & UEExtremities     Tender over L/hip, mild warmth+ R/hip -nontender.     MD Donovan Ballard

## 2023-02-01 NOTE — PERIOP NOTES
TRANSFER - OUT REPORT:    Verbal report given to Ronnie SALAZAR(name) on Rena Pepe  being transferred to [facility](unit) for routine progression of care       Report consisted of patients Situation, Background, Assessment and   Recommendations(SBAR). Information from the following report(s) SBAR was reviewed with the receiving nurse. Lines:   Peripheral IV 02/01/23 Left;Upper (Active)   Site Assessment Clean, dry, & intact 02/01/23 1200   Phlebitis Assessment 0 02/01/23 1200   Infiltration Assessment 0 02/01/23 1200   Dressing Status Clean, dry, & intact 02/01/23 1200   Dressing Type Tape;Transparent 02/01/23 1200   Hub Color/Line Status Pink;Flushed;Capped 02/01/23 1200   Action Taken Open ports on tubing capped 02/01/23 1200   Alcohol Cap Used Yes 02/01/23 1200       Extended Dwell Peripheral IV (Active)   Criteria for Appropriate Use Limited/no vessel suitable for conventional peripheral access 02/01/23 1200   Site Assessment Clean, dry, & intact 02/01/23 1200   Phlebitis Assessment 0 02/01/23 1200   Infiltration Assessment 0 02/01/23 1200   External Catheter Length (cm) 0 centimeters 01/30/23 0800   Line Status Infusing 02/01/23 1200   Line Care Chlorhexidine wipes 02/01/23 1200   Alcohol Cap Used Yes 02/01/23 1200   Date of Last Dressing Change 01/23/23 02/01/23 1200   Dressing Type Disk with Chlorhexadine gluconate (CHG) 02/01/23 1200   Dressing Status Clean, dry, & intact 02/01/23 1200   Dressing Intervention New dressing 01/29/23 0000        Opportunity for questions and clarification was provided.       Patient transported with:   Patient remains on 6L NC

## 2023-02-01 NOTE — PROGRESS NOTES
Gastroenterology Progress Note    2/1/2023    Admit Date: 1/21/2023    Subjective: Follow up for: anemia      She is NPO for the EGD. Hgb is stable. No obvious  bleeding seen  Patient was seen in rounds by me today.      Current Facility-Administered Medications   Medication Dose Route Frequency    albuterol-ipratropium (DUO-NEB) 2.5 MG-0.5 MG/3 ML  3 mL Nebulization Q6H RT    albumin, human-kjda 25% (ALBUMINEX) intravenous solution 12.5 g  12.5 g IntraVENous Q6H    [START ON 2/2/2023] L.acidophilus-paracasei-S.thermophil-bifidobacter (RISAQUAD) 8 billion cell capsule  1 Capsule Oral DAILY    vancomycin (VANCOCIN) 2500 mg in  mL infusion  2,500 mg IntraVENous ONCE    0.9% sodium chloride infusion 250 mL  250 mL IntraVENous PRN    potassium bicarb-citric acid (EFFER-K) tablet 20 mEq  20 mEq Oral DAILY    folic acid (FOLVITE) 1 mg in 0.9% sodium chloride 50 mL ivpb   IntraVENous DAILY    furosemide (LASIX) tablet 40 mg  40 mg Oral DAILY    0.9% sodium chloride infusion 250 mL  250 mL IntraVENous PRN    midodrine (PROAMATINE) tablet 10 mg  10 mg Oral TID WITH MEALS    [Held by provider] enoxaparin (LOVENOX) injection 30 mg  30 mg SubCUTAneous Q12H    alcohol 62% (NOZIN) nasal  1 Ampule  1 Ampule Topical Q12H    insulin lispro (HUMALOG) injection   SubCUTAneous Q6H    glucose chewable tablet 16 g  4 Tablet Oral PRN    glucagon (GLUCAGEN) injection 1 mg  1 mg IntraMUSCular PRN    [Held by provider] metoprolol tartrate (LOPRESSOR) tablet 12.5 mg  12.5 mg Oral BID    atorvastatin (LIPITOR) tablet 10 mg  10 mg Oral QHS    pantoprazole (PROTONIX) tablet 40 mg  40 mg Oral ACB&D    sodium chloride (NS) flush 5-40 mL  5-40 mL IntraVENous Q8H    sodium chloride (NS) flush 5-40 mL  5-40 mL IntraVENous PRN    acetaminophen (TYLENOL) tablet 650 mg  650 mg Oral Q6H PRN    Or    acetaminophen (TYLENOL) suppository 650 mg  650 mg Rectal Q6H PRN    polyethylene glycol (MIRALAX) packet 17 g  17 g Oral DAILY PRN    ondansetron (ZOFRAN ODT) tablet 4 mg  4 mg Oral Q8H PRN    Or    ondansetron (ZOFRAN) injection 4 mg  4 mg IntraVENous Q6H PRN    dextrose 10% infusion 0-250 mL  0-250 mL IntraVENous PRN    balsam peru-castor oiL (VENELEX) ointment   Topical BID     Facility-Administered Medications Ordered in Other Encounters   Medication Dose Route Frequency    iohexoL (OMNIPAQUE) 180 mg iodine/mL injection 20 mL  20 mL Intra artICUlar RAD ONCE    lidocaine (XYLOCAINE) 10 mg/mL (1 %) injection 20 mL  20 mL SubCUTAneous RAD ONCE    lidocaine (XYLOCAINE) 10 mg/mL (1 %) injection 20 mL  20 mL SubCUTAneous RAD ONCE    lidocaine (XYLOCAINE) 10 mg/mL (1 %) injection 10 mL  10 mL SubCUTAneous RAD ONCE        Objective:     Blood pressure (!) 124/47, pulse 84, temperature 98.2 °F (36.8 °C), resp. rate 20, height 5' 3\" (1.6 m), weight 132 kg (291 lb 0.1 oz), SpO2 92 %, not currently breastfeeding.     02/01 0701 - 02/01 1900  In: 100 [I.V.:100]  Out: 200 [Urine:200]    01/30 1901 - 02/01 0700  In: 883.3 [I.V.:600]  Out: 2250 [Urine:2250]        Physical Examination:       General:AAO x 3, obese, SOB  HEENT:  EOMI,   Heart: S1, S2, RRR  GI: Soft, NT, ND + bowel sounds  Extremities: No  cyanosis  CNS: CNs grossly normal.    Data Review    Recent Results (from the past 24 hour(s))   GLUCOSE, POC    Collection Time: 01/31/23  6:20 PM   Result Value Ref Range    Glucose (POC) 118 (H) 65 - 117 mg/dL    Performed by Josiane Mg RN    GLUCOSE, POC    Collection Time: 02/01/23 12:30 AM   Result Value Ref Range    Glucose (POC) 106 65 - 117 mg/dL    Performed by Lisseth Ang RN    PROTHROMBIN TIME + INR    Collection Time: 02/01/23  3:30 AM   Result Value Ref Range    INR 1.1 0.9 - 1.1      Prothrombin time 11.9 (H) 9.0 - 11.1 sec   PTT    Collection Time: 02/01/23  3:30 AM   Result Value Ref Range    aPTT 29.9 22.1 - 31.0 sec    aPTT, therapeutic range     58.0 - 77.0 SECS   CBC W/O DIFF    Collection Time: 02/01/23  3:30 AM   Result Value Ref Range    WBC 11.2 (H) 3.6 - 11.0 K/uL    RBC 2.85 (L) 3.80 - 5.20 M/uL    HGB 8.3 (L) 11.5 - 16.0 g/dL    HCT 27.8 (L) 35.0 - 47.0 %    MCV 97.5 80.0 - 99.0 FL    MCH 29.1 26.0 - 34.0 PG    MCHC 29.9 (L) 30.0 - 36.5 g/dL    RDW 23.0 (H) 11.5 - 14.5 %    PLATELET 481 (L) 751 - 400 K/uL    MPV 12.4 8.9 - 12.9 FL    NRBC 0.7 (H) 0  WBC    ABSOLUTE NRBC 0.08 (H) 0.00 - 9.90 K/uL   METABOLIC PANEL, BASIC    Collection Time: 02/01/23  5:24 AM   Result Value Ref Range    Sodium 138 136 - 145 mmol/L    Potassium 3.6 3.5 - 5.1 mmol/L    Chloride 98 97 - 108 mmol/L    CO2 34 (H) 21 - 32 mmol/L    Anion gap 6 5 - 15 mmol/L    Glucose 83 65 - 100 mg/dL    BUN 12 6 - 20 MG/DL    Creatinine 0.78 0.55 - 1.02 MG/DL    BUN/Creatinine ratio 15 12 - 20      eGFR >60 >60 ml/min/1.73m2    Calcium 8.0 (L) 8.5 - 10.1 MG/DL   MAGNESIUM    Collection Time: 02/01/23  5:24 AM   Result Value Ref Range    Magnesium 2.3 1.6 - 2.4 mg/dL   PHOSPHORUS    Collection Time: 02/01/23  5:24 AM   Result Value Ref Range    Phosphorus 2.1 (L) 2.6 - 4.7 MG/DL   GLUCOSE, POC    Collection Time: 02/01/23 12:12 PM   Result Value Ref Range    Glucose (POC) 93 65 - 117 mg/dL    Performed by Refugio Alvarez RN      Recent Labs     02/01/23  0330 01/31/23  1042   WBC 11.2* 11.1*   HGB 8.3* 7.5*   HCT 27.8* 25.0*   * 116*     Recent Labs     02/01/23  0524 01/31/23  0254 01/30/23  2213 01/30/23  1031    138 137 136   K 3.6 3.9 4.3 3.2*   CL 98 98 96* 96*   CO2 34* 35* 35* 35*   BUN 12 16 18 21*   CREA 0.78 0.89 0.99 1.08*   GLU 83 91 120* 161*   CA 8.0* 7.6* 7.7* 7.3*   MG 2.3 2.4  --  2.2   PHOS 2.1* 1.8*  --  2.6     No results for input(s): AP, TBIL, TP, ALB, GLOB, GGT, AML, LPSE in the last 72 hours. No lab exists for component: SGOT, GPT, AMYP, HLPSE  Recent Labs     02/01/23  0330   INR 1.1   PTP 11.9*   APTT 29.9      No results for input(s): FE, TIBC, PSAT, FERR in the last 72 hours.    Lab Results   Component Value Date/Time Folate 3.8 (L) 01/23/2023 02:09 PM      No results for input(s): PH, PCO2, PO2 in the last 72 hours. No results for input(s): CPK, CKNDX, TROIQ in the last 72 hours. No lab exists for component: CPKMB  Lab Results   Component Value Date/Time    Cholesterol, total 127 07/26/2020 05:34 AM    HDL Cholesterol 41 07/26/2020 05:34 AM    LDL, calculated 55 07/26/2020 05:34 AM    Triglyceride 155 (H) 07/26/2020 05:34 AM    CHOL/HDL Ratio 3.1 07/26/2020 05:34 AM     No components found for: [place]  Lab Results   Component Value Date/Time    Color YELLOW/STRAW 01/22/2023 12:36 AM    Appearance TURBID (A) 01/22/2023 12:36 AM    Specific gravity 1.027 01/22/2023 12:36 AM    Specific gravity 1.010 05/03/2022 06:41 PM    pH (UA) 5.5 01/22/2023 12:36 AM    Protein TRACE (A) 01/22/2023 12:36 AM    Glucose Negative 01/22/2023 12:36 AM    Ketone Negative 01/22/2023 12:36 AM    Bilirubin Negative 01/22/2023 12:36 AM    Urobilinogen 1.0 01/22/2023 12:36 AM    Nitrites Positive (A) 01/22/2023 12:36 AM    Leukocyte Esterase SMALL (A) 01/22/2023 12:36 AM    Epithelial cells MODERATE (A) 01/22/2023 12:36 AM    Bacteria 2+ (A) 01/22/2023 12:36 AM    WBC 5-10 01/22/2023 12:36 AM    RBC 0-5 01/22/2023 12:36 AM        ROS: -CP, SOB, Dysuria, palpitations, cough. Assessment:    Active Problems:    Symptomatic anemia (1/22/2023)             Plan/Discussion: We will proceed w/ a bedside EGD with anesthesia's help today. Signed By: Yoko Yeung.  Lawerence Burkitt, MD    2/1/2023  1:03 PM

## 2023-02-01 NOTE — ANESTHESIA PREPROCEDURE EVALUATION
Relevant Problems   RESPIRATORY SYSTEM   (+) COPD exacerbation (HCC)   (+) JOSLYN (obstructive sleep apnea)   (+) SOB (shortness of breath)      CARDIOVASCULAR   (+) A-fib (HCC)   (+) Atrial flutter with rapid ventricular response (HCC)   (+) CHF exacerbation (HCC)   (+) Essential hypertension      ENDOCRINE   (+) Morbid obesity (HCC)      HEMATOLOGY   (+) Anemia   (+) Hip osteomyelitis (HCC)   (+) Symptomatic anemia       Anesthetic History   No history of anesthetic complications            Review of Systems / Medical History  Patient summary reviewed, nursing notes reviewed and pertinent labs reviewed    Pulmonary    COPD    Sleep apnea: CPAP  Shortness of breath      Comments: Acute on chronic hypercapnic and hypoxic respiratory failure - currently on BiPAP (2/1/23)    Dependence on supplemental O2 - 4L NC    Former smoker   Neuro/Psych             Comments: Sciatica Cardiovascular    Hypertension      CHF: dyspnea on exertion  Dysrhythmias : atrial fibrillation and atrial flutter  Hyperlipidemia    Exercise tolerance: <4 METS  Comments: S/P A-flutter ablation (11/23/20)    Patient taking Xarelto - held    ECG (1/29/23): Normal sinus rhythm   Indeterminate axis   Low voltage QRS   Inferior infarct , age undetermined   Possible Anterolateral infarct , age undetermined   Prolonged QT   Abnormal ECG    TTE (1/25/23):   Left Ventricle: Normal left ventricular systolic function with a visually estimated EF of 60 - 65%. Left ventricle size is normal. Normal wall thickness. Normal wall motion. Normal diastolic function. GI/Hepatic/Renal               Comments: Heme positive stool    Moderate to severe protein malnutrition with an albumin of 2.1    Dysphagia Endo/Other    Diabetes: type 2    Morbid obesity, arthritis and anemia (Hgb = 8.3 on 2/1/23)    Comments:  Thrombocytopenia (Platelets = 764A on 2/2/93)     Other Findings   Comments: Septic shock (1//31/23)  MSSA bacteremia    Erythematous rash (1/31/23)    Hx Left Left femoral neck fracture  Hx Left hip osteomyelitis/septic arthritis  Bilateral buttock and sacral wounds           Physical Exam    Airway  Mallampati: III  TM Distance: 4 - 6 cm  Neck ROM: normal range of motion, short neck   Mouth opening: Normal     Cardiovascular    Rhythm: irregular           Dental    Dentition: Edentulous     Pulmonary  Breath sounds clear to auscultation               Abdominal  GI exam deferred       Other Findings            Anesthetic Plan    ASA: 4  Anesthesia type: MAC and general - backup          Induction: Intravenous  Anesthetic plan and risks discussed with: Patient      [employer]

## 2023-02-01 NOTE — PROGRESS NOTES
Palliative Medicine    10:00am family meeting canceled; pt is going for a procedure now and dtr cannot make it in.   Meeting is rescheduled for Friday 2/3 around 11:30am.

## 2023-02-01 NOTE — PROCEDURES
[place] Office: [phone]      Esophagogastroduodenoscopy Procedure Note      Troy Sanz  1960  [id number]    Indication:  anemia, FOBT +      : Michelle Dutta MD    Referring Provider:  Unknown, Provider    Sedation:  MAC anesthesia Propofol    Procedure Details:  After detailed informed consent was obtained for the procedure, with all risks and benefits of procedure explained the patient was taken to the endoscopy suite and placed in the left lateral decubitus position. Following sequential administration of sedation as per above, the endoscope was inserted into the mouth and advanced under direct vision to second portion of the duodenum. A careful inspection was made as the gastroscope was withdrawn, including a retroflexed view of the proximal stomach; findings and interventions are described below. Findings:     Esophagus: The esophageal mucosa in the proximal, mid and distal esophagus is normal.   The squamo-columnar junction is at 44 cm where the Z-line was noted. Stomach: The gastric mucosa has moderately diffuse erythema compatible with gastritis in the body. Multiple biopsies taken. Antrum has spoke-like radial erythema(This does not look like GAVE though): biopsies taken  The fundus was found to be normal with no lesions noted on retroflexion. The angularis is normal as well. Duodenum:   The bulbar mucosa has patchy erythema with a few erosions. Biopsies obtained. The duodenal folds are normal in D2. No blood seen in the upper GI tract. Therapies:  biopsy of stomach body, antrum  biopsy of duodenal distal bulb    Specimen: Specimens were collected as described and send to the laboratory. Complications:   None were encountered during the procedure. EBL:  None. Recommendations:     -Continue acid suppression. ,   -Acid suppression with a proton pump inhibitor. ,   -Await pathology. ,  -Follow hgb and transfuse PRBC prn.  -I spoke w/ Kareen/daughter re: EGD findings([phone])    Thank you for entrusting me with this patient's care. Please do not hesitate to contact me with any questions or if I can be of assistance with any of your other patients' GI needs. Paz Paniagua MD  2/1/2023  1:17 PM

## 2023-02-01 NOTE — PROGRESS NOTES
1930: Bedside shift change report given to MARIE Anderson (oncoming nurse) by Juanita Dyer RN (offgoing nurse). Report included the following information SBAR, Kardex, Intake/Output, MAR, Recent Results, and Cardiac Rhythm NSR .     2000: Shift assessment complete, see flowsheet for details. Patient is A&Ox4 and follows commands. Patient is NSR on the monitor. Patient's breath sounds are diminished with some scattered wheezing. Patient still has red rash covering both arms and chest but is not complaining of any itching. 2030: Patient is wheezing and O2 sat is dropping to 86% on 6L nasal cannula. Placed patient on BIPAP early and patient still at 86%. RT came to bedside to adjust BIPAP settings. Messaged COREEN Baumann and notified him of above. Received orders for chest xray and 80mg of lasix. 2214: COREEN Baumann came to bedside to see patient. Clarified whether or not to give 80mg of lasix due to patient being hypotensive and was told to give 40mg of lasix instead. 2239: While going in room to administer lasix, patient BP is 90/39. Perfect serve sent to COREEN Baumann to confirm whether or not to give the lasix and was told it was okay to administer. 0000: Reassessment complete, see flowsheet for details. No changes to previous assessment. 0400: Reassessment complete, see flowsheet for details. No changes to previous assessment. 0700: Bedside shift change report given to MARIE Fields (oncoming nurse) by Lyudmila Sesay RN (offgoing nurse). Report included the following information SBAR, Kardex, Intake/Output, MAR, Recent Results, and Cardiac Rhythm NSR .

## 2023-02-01 NOTE — ANESTHESIA POSTPROCEDURE EVALUATION
Procedure(s):  ESOPHAGOGASTRODUODENOSCOPY (EGD)  ESOPHAGOGASTRODUODENAL (EGD) BIOPSY. MAC, general - backup    Anesthesia Post Evaluation        Patient location during evaluation: PACU  Note status: Adequate. Level of consciousness: responsive to verbal stimuli and sleepy but conscious  Pain management: satisfactory to patient  Airway patency: patent  Anesthetic complications: no  Cardiovascular status: acceptable  Respiratory status: acceptable  Hydration status: acceptable  Comments: +Post-Anesthesia Evaluation and Assessment    Patient: Kulwinder Tijerina MRN: [id number]  SSN: [id number]   YOB: 1960  Age: 60 y.o. Sex: female      Cardiovascular Function/Vital Signs    BP (!) 123/52   Pulse 83   Temp 36.8 °C (98.2 °F)   Resp (!) 33   Ht 5' 3\" (1.6 m)   Wt 132 kg (291 lb 0.1 oz)   SpO2 92%   Breastfeeding No   BMI 51.55 kg/m²     Patient is status post Procedure(s):  ESOPHAGOGASTRODUODENOSCOPY (EGD)  ESOPHAGOGASTRODUODENAL (EGD) BIOPSY. Nausea/Vomiting: Controlled. Postoperative hydration reviewed and adequate. Pain:  Pain Scale 1: Numeric (0 - 10) (02/01/23 1245)  Pain Intensity 1: 0 (02/01/23 1245)   Managed. Neurological Status: At baseline. Mental Status and Level of Consciousness: Arousable. Pulmonary Status:   O2 Device: Nasal cannula (02/01/23 1318)   Adequate oxygenation and airway patent. Complications related to anesthesia: None    Post-anesthesia assessment completed. No concerns. Signed By: Beth Collins MD    2/1/2023  Post anesthesia nausea and vomiting:  controlled      INITIAL Post-op Vital signs:   Vitals Value Taken Time   /51 02/01/23 1322   Temp     Pulse 83 02/01/23 1322   Resp 26 02/01/23 1322   SpO2 85 % 02/01/23 1322   Vitals shown include unvalidated device data.

## 2023-02-01 NOTE — PROGRESS NOTES
Hematology Oncology Progress Note    Follow up for: anemia    Chart notes reviewed since last visit. Case discussed with following:     Patient complains of the following: going down for hip aspiration, rash still present, no itching or pain associated. Lasix given ON, required bipap ON and now back on NC.     Additional concerns noted by the staff:  NurseTika     Patient Vitals for the past 24 hrs:   BP Temp Pulse Resp SpO2   02/01/23 0800 (!) 97/41 97.4 °F (36.3 °C) 68 18 96 %   02/01/23 0730 -- -- -- -- 94 %   02/01/23 0700 (!) 91/40 -- 68 22 90 %   02/01/23 0600 (!) 104/44 -- 67 23 98 %   02/01/23 0500 (!) 111/42 -- 70 21 98 %   02/01/23 0400 (!) 103/44 97.7 °F (36.5 °C) 71 23 94 %   02/01/23 0300 (!) 92/41 -- 74 23 99 %   02/01/23 0200 (!) 93/41 -- 74 23 97 %   02/01/23 0151 -- -- -- -- 98 %   02/01/23 0100 (!) 92/42 -- 71 24 98 %   02/01/23 0025 -- -- -- -- 96 %   02/01/23 0000 (!) 90/41 98.2 °F (36.8 °C) 70 19 95 %   01/31/23 2315 (!) 93/40 -- 74 -- --   01/31/23 2300 (!) 97/36 -- 77 20 99 %   01/31/23 2230 (!) 90/39 -- 74 24 99 %   01/31/23 2200 (!) 96/44 -- 76 24 95 %   01/31/23 2100 (!) 96/50 -- 81 29 91 %   01/31/23 2047 -- -- -- -- 90 %   01/31/23 2000 (!) 111/52 97.9 °F (36.6 °C) 91 (!) 36 90 %   01/31/23 1900 (!) 115/54 -- 91 28 90 %   01/31/23 1825 (!) 104/49 -- -- -- --   01/31/23 1800 (!) 104/49 -- 77 23 96 %   01/31/23 1700 102/74 -- 80 (!) 32 94 %   01/31/23 1600 (!) 117/45 97.8 °F (36.6 °C) 86 (!) 38 93 %   01/31/23 1530 (!) 107/46 -- 84 (!) 31 97 %   01/31/23 1500 -- -- 92 (!) 36 94 %   01/31/23 1400 128/74 -- 76 25 93 %   01/31/23 1300 (!) 119/54 -- 70 26 96 %   01/31/23 1200 (!) 114/54 98.1 °F (36.7 °C) 70 24 97 %   01/31/23 1130 135/64 -- 79 (!) 33 95 %   01/31/23 1100 124/62 -- 75 (!) 31 96 %       Physical Examination:  Gen some distress  Resp: 6L NC, distant anteriorly  Abd Obese  Skin: bright erythematous macular rash on chest and b/l UE volar surfaces  Ext 1-2+ pitting edema b/l Labs:  Recent Results (from the past 24 hour(s))   HAPTOGLOBIN    Collection Time: 01/31/23 10:42 AM   Result Value Ref Range    Haptoglobin 125 30 - 200 mg/dL   LD    Collection Time: 01/31/23 10:42 AM   Result Value Ref Range     (H) 81 - 246 U/L   CBC WITH AUTOMATED DIFF    Collection Time: 01/31/23 10:42 AM   Result Value Ref Range    WBC 11.1 (H) 3.6 - 11.0 K/uL    RBC 2.61 (L) 3.80 - 5.20 M/uL    HGB 7.5 (L) 11.5 - 16.0 g/dL    HCT 25.0 (L) 35.0 - 47.0 %    MCV 95.8 80.0 - 99.0 FL    MCH 28.7 26.0 - 34.0 PG    MCHC 30.0 30.0 - 36.5 g/dL    RDW 22.9 (H) 11.5 - 14.5 %    PLATELET 358 (L) 417 - 400 K/uL    MPV 11.0 8.9 - 12.9 FL    NRBC 0.7 (H) 0  WBC    ABSOLUTE NRBC 0.08 (H) 0.00 - 0.01 K/uL    NEUTROPHILS 87 (H) 32 - 75 %    BAND NEUTROPHILS 3 %    LYMPHOCYTES 6 (L) 12 - 49 %    MONOCYTES 2 (L) 5 - 13 %    EOSINOPHILS 2 0 - 7 %    BASOPHILS 0 0 - 1 %    IMMATURE GRANULOCYTES 0 0.0 - 0.5 %    ABS. NEUTROPHILS 10.0 (H) 1.8 - 8.0 K/UL    ABS. LYMPHOCYTES 0.7 (L) 0.8 - 3.5 K/UL    ABS. MONOCYTES 0.2 0.0 - 1.0 K/UL    ABS. EOSINOPHILS 0.2 0.0 - 0.4 K/UL    ABS. BASOPHILS 0.0 0.0 - 0.1 K/UL    ABS. IMM.  GRANS. 0.0 0.00 - 0.04 K/UL    DF MANUAL      RBC COMMENTS ANISOCYTOSIS  2+       RETICULOCYTE COUNT    Collection Time: 01/31/23 10:42 AM   Result Value Ref Range    Reticulocyte count 4.8 (H) 0.7 - 2.1 %    Absolute Retic Cnt. 0.1253 (H) 0.0164 - 0.0776 M/ul   FIBRINOGEN    Collection Time: 01/31/23 10:42 AM   Result Value Ref Range    Fibrinogen 232 200 - 475 mg/dL   GLUCOSE, POC    Collection Time: 01/31/23 11:41 AM   Result Value Ref Range    Glucose (POC) 124 (H) 65 - 117 mg/dL    Performed by Gabriella Pulido RN    GLUCOSE, POC    Collection Time: 01/31/23  6:20 PM   Result Value Ref Range    Glucose (POC) 118 (H) 65 - 117 mg/dL    Performed by Gabriella Pulido RN    GLUCOSE, POC    Collection Time: 02/01/23 12:30 AM   Result Value Ref Range    Glucose (POC) 106 65 - 117 mg/dL Performed by Dana Corral RN    PROTHROMBIN TIME + INR    Collection Time: 02/01/23  3:30 AM   Result Value Ref Range    INR 1.1 0.9 - 1.1      Prothrombin time 11.9 (H) 9.0 - 11.1 sec   PTT    Collection Time: 02/01/23  3:30 AM   Result Value Ref Range    aPTT 29.9 22.1 - 31.0 sec    aPTT, therapeutic range     58.0 - 77.0 SECS   CBC W/O DIFF    Collection Time: 02/01/23  3:30 AM   Result Value Ref Range    WBC 11.2 (H) 3.6 - 11.0 K/uL    RBC 2.85 (L) 3.80 - 5.20 M/uL    HGB 8.3 (L) 11.5 - 16.0 g/dL    HCT 27.8 (L) 35.0 - 47.0 %    MCV 97.5 80.0 - 99.0 FL    MCH 29.1 26.0 - 34.0 PG    MCHC 29.9 (L) 30.0 - 36.5 g/dL    RDW 23.0 (H) 11.5 - 14.5 %    PLATELET 701 (L) 258 - 400 K/uL    MPV 12.4 8.9 - 12.9 FL    NRBC 0.7 (H) 0  WBC    ABSOLUTE NRBC 0.08 (H) 0.00 - 3.56 K/uL   METABOLIC PANEL, BASIC    Collection Time: 02/01/23  5:24 AM   Result Value Ref Range    Sodium 138 136 - 145 mmol/L    Potassium 3.6 3.5 - 5.1 mmol/L    Chloride 98 97 - 108 mmol/L    CO2 34 (H) 21 - 32 mmol/L    Anion gap 6 5 - 15 mmol/L    Glucose 83 65 - 100 mg/dL    BUN 12 6 - 20 MG/DL    Creatinine 0.78 0.55 - 1.02 MG/DL    BUN/Creatinine ratio 15 12 - 20      eGFR >60 >60 ml/min/1.73m2    Calcium 8.0 (L) 8.5 - 10.1 MG/DL   MAGNESIUM    Collection Time: 02/01/23  5:24 AM   Result Value Ref Range    Magnesium 2.3 1.6 - 2.4 mg/dL   PHOSPHORUS    Collection Time: 02/01/23  5:24 AM   Result Value Ref Range    Phosphorus 2.1 (L) 2.6 - 4.7 MG/DL       Assessment and Plan:     Anemia:  -folate and iron deficiency, started on folic acid and IV iron given last week.  -transfuse for HBG < 7, getting transfusion today   -PRBC given 1/30 and 5/18  - Switched folic acid to IV on 4/97- to ensure she is able to absorb supplement, she was still macrocytic and anemia not responding well to prbc, has some diarrhea issues making me question absorption.   -GI following, needing to optimize resp status before any attempt at EGD/colonoscopy, no reports of GI bleeding here. - Hbg improved today. Labs yesterday still not showing any hemolysis or DIC.    *) Thrombocytopenia:  - DDX: Drug related, infection, HIT. No DIC  - HIT ab pending, was on Lovenox which has lower incidence of HIT but still is possibility    *) Resp distress  -required bipap ON, lasix given and now on NC. *) Sepsis  - Weaned off Levophed. . On midodrine    *) Rash:   - likely drug rash, switched zosyn to nafillin per primary.   - Rash appeared 1/30 and received zosyn, IV KCL  and blood.    - No fever

## 2023-02-01 NOTE — PROGRESS NOTES
Hospitalist Progress Note    NAME: Sonia Lira   :  1960   MRN:  [id number]     Lizbeth Quiles is a 60 y.o.  female with pertinent past medical history of chronic hypoxemic respiratory failure on 4 L/min home oxygen therapy secondary to COPD, diabetes mellitus, essential hypertension, hyperlipidemia, JOSLYN compliant with CPAP, history duodenal ulcers, chronic anemia of indeterminate etiology who was came to ED after her routine lab work showed Hb of 5.3. received PRBC transfusion, Occult stool negative, GI recommend Hematology evaluation. She develop acute hypercapnic respiratory failure on floor and was transferred to ICU on . Now on 6 liters oxygen and transferred to hospitalist service on . Assessment / Plan:  Acute on chronic hypoxic respiratory failure  COPD-- end stage/Poor prognosis,   JOSLYN compliant with CPAP  Diastolic heart failure     Transferred to ICU on  for acute hypercapnic resp failure  Avoid Diamox  Back on BIPAP overnight, cont during day as needed  Continue nebs  Resume oral lasix  Palliative meeting for hospice transition on 2/3 noted    Septic shock  MSSA bacteremia  Left femoral neck fracture:    Off pressors now  Ortho to review CT pelvis-? Aspiration of fluid in left hip. S/p Zosyn, now on Nafcillin  Repeat blood cultures  Respiratory cultures  Apiration precuation  May need YANNA  Cw midodrine  ID following    Acute on chronic anemia:  GI following, will be high risk for Endoscopy, colonscopy  Occult stool negative. No signs of active bleeding  GI considering Capsule study  Hematology following  Has folate and iron deficiency, started on iv folic acid and iv iron last week  S/p transfusion yesterday and today as well. Erythematous rash:  Has this rash on b/l uper and lower ext, chest as well  ? D/t medication, zosyn switched to Nafciliin     Type II DM:  Continue lispro sliding scale     Paroxysmal afib  CAD:  Continue statin. Metoprolol held? ?, resume if HR remains stable  Aspirin held. Severe obesity  BMI 51     Code status: DNR now  Prophylaxis: SCD's  Recommended Disposition: Home w/Family with Hospice likely pending Palliative Family meeting on 2/3 as per palliative note today       Subjective:     Chief Complaint / Reason for Physician Visit  Seen in ICU  Doesn't feel better today, on BIPAP overnight and still on it, has left hip pain  Has rash through out her body    Review of Systems:  Symptom Y/N Comments  Symptom Y/N Comments   Fever/Chills n   Chest Pain n    Poor Appetite y   Edema n    Cough n   Abdominal Pain n    Sputum n   Joint Pain y Left hip pain   SOB/CHOPRA n   Pruritis/Rash     Nausea/vomit    Tolerating PT/OT     Diarrhea    Tolerating Diet     Constipation    Other       Could NOT obtain due to:      Objective:     VITALS:   Last 24hrs VS reviewed since prior progress note.  Most recent are:  Patient Vitals for the past 24 hrs:   Temp Pulse Resp BP SpO2   02/01/23 0800 97.4 °F (36.3 °C) 68 18 (!) 97/41 96 %   02/01/23 0730 -- -- -- -- 94 %   02/01/23 0700 -- 68 22 (!) 91/40 90 %   02/01/23 0600 -- 67 23 (!) 104/44 98 %   02/01/23 0500 -- 70 21 (!) 111/42 98 %   02/01/23 0400 97.7 °F (36.5 °C) 71 23 (!) 103/44 94 %   02/01/23 0300 -- 74 23 (!) 92/41 99 %   02/01/23 0200 -- 74 23 (!) 93/41 97 %   02/01/23 0151 -- -- -- -- 98 %   02/01/23 0100 -- 71 24 (!) 92/42 98 %   02/01/23 0025 -- -- -- -- 96 %   02/01/23 0000 98.2 °F (36.8 °C) 70 19 (!) 90/41 95 %   01/31/23 2315 -- 74 -- (!) 93/40 --   01/31/23 2300 -- 77 20 (!) 97/36 99 %   01/31/23 2230 -- 74 24 (!) 90/39 99 %   01/31/23 2200 -- 76 24 (!) 96/44 95 %   01/31/23 2100 -- 81 29 (!) 96/50 91 %   01/31/23 2047 -- -- -- -- 90 %   01/31/23 2000 97.9 °F (36.6 °C) 91 (!) 36 (!) 111/52 90 %   01/31/23 1900 -- 91 28 (!) 115/54 90 %   01/31/23 1825 -- -- -- (!) 104/49 --   01/31/23 1800 -- 77 23 (!) 104/49 96 %   01/31/23 1700 -- 80 (!) 32 102/74 94 %   01/31/23 1600 97.8 °F (36.6 °C) 86 (!) 38 (!) 117/45 93 %   01/31/23 1530 -- 84 (!) 31 (!) 107/46 97 %   01/31/23 1500 -- 92 (!) 36 -- 94 %   01/31/23 1400 -- 76 25 128/74 93 %   01/31/23 1300 -- 70 26 (!) 119/54 96 %   01/31/23 1200 98.1 °F (36.7 °C) 70 24 (!) 114/54 97 %         Intake/Output Summary (Last 24 hours) at 2/1/2023 1153  Last data filed at 2/1/2023 2752  Gross per 24 hour   Intake 700 ml   Output 1250 ml   Net -550 ml          I had a face to face encounter and independently examined this patient on 2/1/2023, as outlined below:  PHYSICAL EXAM:  General: Awake, No acute distress  EENT:  EOMI. Anicteric sclerae. MMM  Resp:  Decreased breath sound b/l  CV:  Regular  rhythm,  No edema  GI:  Soft, Non distended, Non tender. +Bowel sounds  Neurologic:  Alert and oriented X 3, normal speech,   Psych:   Not anxious nor agitated  Skin:  No rashes. No jaundice    Reviewed most current lab test results and cultures  YES  Reviewed most current radiology test results   YES  Review and summation of old records today    NO  Reviewed patient's current orders and MAR    YES  PMH/ reviewed - no change compared to H&P  ________________________________________________________________________  Care Plan discussed with:    Comments   Patient x    Family      RN x    Care Manager     Consultant                        Multidiciplinary team rounds were held today with , nursing, pharmacist and clinical coordinator. Patient's plan of care was discussed; medications were reviewed and discharge planning was addressed.      ________________________________________________________________________  Total NON critical care TIME:  36   Minutes    Total CRITICAL CARE TIME Spent:   Minutes non procedure based      Comments   >50% of visit spent in counseling and coordination of care     ________________________________________________________________________  Justin Smith MD     Procedures: see electronic medical records for all procedures/Xrays and details which were not copied into this note but were reviewed prior to creation of Plan. LABS:  I reviewed today's most current labs and imaging studies.   Pertinent labs include:  Recent Labs     02/01/23 0330 01/31/23  1042 01/31/23  0254   WBC 11.2* 11.1* 11.1*   HGB 8.3* 7.5* 6.5*   HCT 27.8* 25.0* 22.2*   * 116* 128*       Recent Labs     02/01/23  0524 02/01/23 0330 01/31/23  0254 01/30/23  2213 01/30/23  1031     --  138 137 136   K 3.6  --  3.9 4.3 3.2*   CL 98  --  98 96* 96*   CO2 34*  --  35* 35* 35*   GLU 83  --  91 120* 161*   BUN 12  --  16 18 21*   CREA 0.78  --  0.89 0.99 1.08*   CA 8.0*  --  7.6* 7.7* 7.3*   MG 2.3  --  2.4  --  2.2   PHOS 2.1*  --  1.8*  --  2.6   INR  --  1.1  --   --   --          Signed: Payton Tavera MD

## 2023-02-01 NOTE — PROGRESS NOTES
0700- Bedside and Verbal shift change report given to Irma Stubbs and Prudencio Jerez RN (oncoming nurse) by Maribell Griffith (offgoing nurse). Report included the following information SBAR, Kardex, Intake/Output, MAR, Recent Results, and Cardiac Rhythm Normal Sinus . 0800- Shift assessment completed, see flow chart for findings. Patient relaxed A&O x4 still has rash on chest and arms bilaterally. 7144- Patient put on NC at 6L, and headed down to  for Hip Aspiration. 1157- Patient returned from procedure, were unable to do the intervention. 1200- Reassessment was completed  see flow sheet for details. Patient has no change. Dr. Perez Comes at bedside see Physician note       [id number]- Endo arrived at bedside      1700- Patient turned and kimmy care and zinc cream applied to abdominal excoriations and venelex applied to left hip.    1900- Bedside and Verbal shift change report given to Ct Funez RN (oncoming nurse) by Irma Stubbs and Prudencio Jerez RN (offgoing nurse). Report included the following information SBAR, Kardex, Intake/Output, MAR, and Cardiac Rhythm Normal Sinus .

## 2023-02-01 NOTE — PROGRESS NOTES
Cross cover note: This is a 56-year-old female admitted with acute on chronic hypercapnic and hypoxic respiratory failure who was found to be hypoxic again tonight with wheezing felt to be related to possible volume overload. Chest x-ray showed progressive pulmonary infiltrates with no obvious worsening of this condition. Patient with 2+ pedal edema. She has been found to be moderate to severe protein malnutrition with an albumin of 2.1. Will give 1 dose of Lasix and supplement with albumin. Patient tolerating BiPAP without difficulty with oxygen saturations greater than 95%. Have changed duo nebs to scheduled as this most likely represents some form of reactive airway disease. Patient already on potassium supplementation as she has been extremely low during this hospitalization.

## 2023-02-02 LAB
ANION GAP SERPL CALC-SCNC: 7 MMOL/L (ref 5–15)
BACTERIA SPEC CULT: NORMAL
BUN SERPL-MCNC: 9 MG/DL (ref 6–20)
BUN/CREAT SERPL: 10 (ref 12–20)
CALCIUM SERPL-MCNC: 8.2 MG/DL (ref 8.5–10.1)
CHLORIDE SERPL-SCNC: 98 MMOL/L (ref 97–108)
CO2 SERPL-SCNC: 35 MMOL/L (ref 21–32)
CREAT SERPL-MCNC: 0.87 MG/DL (ref 0.55–1.02)
ERYTHROCYTE [DISTWIDTH] IN BLOOD BY AUTOMATED COUNT: 22.8 % (ref 11.5–14.5)
GLUCOSE BLD STRIP.AUTO-MCNC: 120 MG/DL (ref 65–117)
GLUCOSE BLD STRIP.AUTO-MCNC: 121 MG/DL (ref 65–117)
GLUCOSE BLD STRIP.AUTO-MCNC: 124 MG/DL (ref 65–117)
GLUCOSE BLD STRIP.AUTO-MCNC: 80 MG/DL (ref 65–117)
GLUCOSE BLD STRIP.AUTO-MCNC: 93 MG/DL (ref 65–117)
GLUCOSE SERPL-MCNC: 91 MG/DL (ref 65–100)
HCT VFR BLD AUTO: 26.1 % (ref 35–47)
HGB BLD-MCNC: 7.8 G/DL (ref 11.5–16)
MCH RBC QN AUTO: 29.3 PG (ref 26–34)
MCHC RBC AUTO-ENTMCNC: 29.9 G/DL (ref 30–36.5)
MCV RBC AUTO: 98.1 FL (ref 80–99)
NRBC # BLD: 0.1 K/UL (ref 0–0.01)
NRBC BLD-RTO: 0.7 PER 100 WBC
PF4 HEPARIN CMPLX AB SER-ACNC: 0.11 OD (ref 0–0.4)
PLATELET # BLD AUTO: 141 K/UL (ref 150–400)
PMV BLD AUTO: 11.6 FL (ref 8.9–12.9)
POTASSIUM SERPL-SCNC: 3.6 MMOL/L (ref 3.5–5.1)
RBC # BLD AUTO: 2.66 M/UL (ref 3.8–5.2)
SERVICE CMNT-IMP: ABNORMAL
SERVICE CMNT-IMP: NORMAL
SODIUM SERPL-SCNC: 140 MMOL/L (ref 136–145)
WBC # BLD AUTO: 13.4 K/UL (ref 3.6–11)

## 2023-02-02 PROCEDURE — 74011250636 HC RX REV CODE- 250/636: Performed by: INTERNAL MEDICINE

## 2023-02-02 PROCEDURE — 74011250637 HC RX REV CODE- 250/637: Performed by: INTERNAL MEDICINE

## 2023-02-02 PROCEDURE — 85027 COMPLETE CBC AUTOMATED: CPT

## 2023-02-02 PROCEDURE — 74011250637 HC RX REV CODE- 250/637: Performed by: STUDENT IN AN ORGANIZED HEALTH CARE EDUCATION/TRAINING PROGRAM

## 2023-02-02 PROCEDURE — 65270000046 HC RM TELEMETRY

## 2023-02-02 PROCEDURE — 74011000250 HC RX REV CODE- 250: Performed by: PHYSICIAN ASSISTANT

## 2023-02-02 PROCEDURE — 74011000250 HC RX REV CODE- 250: Performed by: INTERNAL MEDICINE

## 2023-02-02 PROCEDURE — 80048 BASIC METABOLIC PNL TOTAL CA: CPT

## 2023-02-02 PROCEDURE — 36415 COLL VENOUS BLD VENIPUNCTURE: CPT

## 2023-02-02 PROCEDURE — 77010033678 HC OXYGEN DAILY

## 2023-02-02 PROCEDURE — 82962 GLUCOSE BLOOD TEST: CPT

## 2023-02-02 PROCEDURE — 74011000250 HC RX REV CODE- 250: Performed by: STUDENT IN AN ORGANIZED HEALTH CARE EDUCATION/TRAINING PROGRAM

## 2023-02-02 PROCEDURE — 94640 AIRWAY INHALATION TREATMENT: CPT

## 2023-02-02 PROCEDURE — 94660 CPAP INITIATION&MGMT: CPT

## 2023-02-02 PROCEDURE — 99233 SBSQ HOSP IP/OBS HIGH 50: CPT | Performed by: INTERNAL MEDICINE

## 2023-02-02 PROCEDURE — 74011000258 HC RX REV CODE- 258: Performed by: INTERNAL MEDICINE

## 2023-02-02 RX ORDER — SUCRALFATE 1 G/1
1 TABLET ORAL
Status: DISCONTINUED | OUTPATIENT
Start: 2023-02-02 | End: 2023-02-10 | Stop reason: HOSPADM

## 2023-02-02 RX ORDER — MICONAZOLE NITRATE 20 MG/G
CREAM TOPICAL 2 TIMES DAILY
Status: DISCONTINUED | OUTPATIENT
Start: 2023-02-02 | End: 2023-02-02 | Stop reason: CLARIF

## 2023-02-02 RX ADMIN — Medication 1 AMPULE: at 09:00

## 2023-02-02 RX ADMIN — FUROSEMIDE 40 MG: 40 TABLET ORAL at 08:51

## 2023-02-02 RX ADMIN — MIDODRINE HYDROCHLORIDE 10 MG: 5 TABLET ORAL at 17:23

## 2023-02-02 RX ADMIN — Medication 1 CAPSULE: at 08:51

## 2023-02-02 RX ADMIN — IPRATROPIUM BROMIDE AND ALBUTEROL SULFATE 3 ML: 2.5; .5 SOLUTION RESPIRATORY (INHALATION) at 01:00

## 2023-02-02 RX ADMIN — PANTOPRAZOLE SODIUM 40 MG: 40 TABLET, DELAYED RELEASE ORAL at 08:51

## 2023-02-02 RX ADMIN — VANCOMYCIN HYDROCHLORIDE 1000 MG: 1 INJECTION, POWDER, LYOPHILIZED, FOR SOLUTION INTRAVENOUS at 13:00

## 2023-02-02 RX ADMIN — IPRATROPIUM BROMIDE AND ALBUTEROL SULFATE 3 ML: 2.5; .5 SOLUTION RESPIRATORY (INHALATION) at 14:45

## 2023-02-02 RX ADMIN — CAMPHOR, MENTHOL: .5; .5 LOTION TOPICAL at 21:15

## 2023-02-02 RX ADMIN — MIDODRINE HYDROCHLORIDE 10 MG: 5 TABLET ORAL at 13:01

## 2023-02-02 RX ADMIN — CAMPHOR, MENTHOL: .5; .5 LOTION TOPICAL at 17:24

## 2023-02-02 RX ADMIN — SODIUM CHLORIDE, PRESERVATIVE FREE 10 ML: 5 INJECTION INTRAVENOUS at 13:10

## 2023-02-02 RX ADMIN — MIDODRINE HYDROCHLORIDE 10 MG: 5 TABLET ORAL at 08:52

## 2023-02-02 RX ADMIN — IPRATROPIUM BROMIDE AND ALBUTEROL SULFATE 3 ML: 2.5; .5 SOLUTION RESPIRATORY (INHALATION) at 08:10

## 2023-02-02 RX ADMIN — Medication: at 17:24

## 2023-02-02 RX ADMIN — IPRATROPIUM BROMIDE AND ALBUTEROL SULFATE 3 ML: 2.5; .5 SOLUTION RESPIRATORY (INHALATION) at 19:33

## 2023-02-02 RX ADMIN — Medication 1 AMPULE: at 21:20

## 2023-02-02 RX ADMIN — ATORVASTATIN CALCIUM 10 MG: 10 TABLET, FILM COATED ORAL at 21:15

## 2023-02-02 RX ADMIN — CASTOR OIL AND BALSAM, PERU: 788; 87 OINTMENT TOPICAL at 09:26

## 2023-02-02 RX ADMIN — SUCRALFATE 1 G: 1 TABLET ORAL at 21:15

## 2023-02-02 RX ADMIN — Medication 1 AMPULE: at 08:52

## 2023-02-02 RX ADMIN — PANTOPRAZOLE SODIUM 40 MG: 40 TABLET, DELAYED RELEASE ORAL at 17:23

## 2023-02-02 RX ADMIN — SODIUM CHLORIDE, PRESERVATIVE FREE 10 ML: 5 INJECTION INTRAVENOUS at 21:16

## 2023-02-02 RX ADMIN — POTASSIUM BICARBONATE 20 MEQ: 391 TABLET, EFFERVESCENT ORAL at 08:52

## 2023-02-02 RX ADMIN — VANCOMYCIN HYDROCHLORIDE 1000 MG: 1 INJECTION, POWDER, LYOPHILIZED, FOR SOLUTION INTRAVENOUS at 01:34

## 2023-02-02 RX ADMIN — FOLIC ACID: 5 INJECTION, SOLUTION INTRAMUSCULAR; INTRAVENOUS; SUBCUTANEOUS at 09:02

## 2023-02-02 RX ADMIN — SODIUM CHLORIDE, PRESERVATIVE FREE 10 ML: 5 INJECTION INTRAVENOUS at 06:26

## 2023-02-02 RX ADMIN — SUCRALFATE 1 G: 1 TABLET ORAL at 13:01

## 2023-02-02 RX ADMIN — SUCRALFATE 1 G: 1 TABLET ORAL at 17:23

## 2023-02-02 RX ADMIN — CASTOR OIL AND BALSAM, PERU: 788; 87 OINTMENT TOPICAL at 21:15

## 2023-02-02 NOTE — PROGRESS NOTES
1900 - Received report from Reggie Go, [address] [place] [address] - Assessment completed and documented. 2130 - Wiped with CHG, gown changed, pads changed. 2200 - Put on BIPAP mask with fi02 50%    0000 - Re-assessment completed. 0400 - No changes from previous assessment.     0700 - Report given to MARIE Rosa

## 2023-02-02 NOTE — PROGRESS NOTES
Hospitalist Progress Note    NAME: Jeff Hurtado   :  1960   MRN:  [id number]     Corky Gibbs is a 60 y.o.  female with pertinent past medical history of chronic hypoxemic respiratory failure on 4 L/min home oxygen therapy secondary to COPD, diabetes mellitus, essential hypertension, hyperlipidemia, JOSLYN compliant with CPAP, history duodenal ulcers, chronic anemia of indeterminate etiology who was came to ED after her routine lab work showed Hb of 5.3. received PRBC transfusion, Occult stool negative, GI recommend Hematology evaluation. She develop acute hypercapnic respiratory failure on floor and was transferred to ICU on . Now on 6 liters oxygen when off BIPAP- remains on it for longer in mornings for past few days  Awaiting Transfer out of ICU since . S/p EGD by GI - found duodenitis/gastritis, ID following     Assessment / Plan:  Acute on chronic hypoxic respiratory failure POA- BIPAP dependency increased in 24-48 hrs, able to get off it and remain on 6L/m for brief durations  COPD-- end stage/Poor prognosis,   JOSLYN compliant with CPAP  Diastolic heart failure     Transferred to ICU on  for acute hypercapnic resp failure  Avoid Diamox  Back on BIPAP overnight, cont during day as needed  Continue nebs  Resume oral lasix  Palliative meeting rescheduled for hospice transition on 2/3 11:30 am noted    Septic shock  MSSA bacteremia  Left femoral neck fracture    Off pressors now  Ortho to review CT pelvis-? Aspiration of fluid in left hip -failed attempt by IR ()  S/p Zosyn, was on Nafcillin- changed to Vancomycin per ID   Last Repeat blood cultures () neg x 2 days for now  Respiratory cultures  Apiration precuation  May need YANNA  Cw midodrine  ID following    Acute on chronic anemia:  GI following, will be high risk for Endoscopy, colonscopy  Occult stool negative.    No signs of active bleeding  GI considering Capsule study  Hematology following  Has folate and iron deficiency, started on iv folic acid and iv iron last week  S/p transfusions  S/p EGD 2/1 by GI- found duodenitis/gastritis - recommends to cont PPI BID + carafate (added today)    Erythematous rash- persistent  Has this rash on b/l uper and lower ext, chest as well  ? D/t medication, zosyn switched to Nafcillin switched to Vancomycin 2/1     Type II DM:  Continue lispro sliding scale     Paroxysmal afib  CAD:  Continue statin. Metoprolol held??, resume if HR remains stable  Aspirin held. Severe obesity  BMI 51     Code status: DNR now  Prophylaxis: SCD's  Recommended Disposition: Home w/Family with Hospice likely pending Palliative Family meeting on 2/3 as per palliative        Subjective:     Chief Complaint / Reason for Physician Visit  Seen in ICU  Doesn't feel much better today, on BIPAP overnight and still on it, has left hip pain  Has rash through out her body    Review of Systems:  Symptom Y/N Comments  Symptom Y/N Comments   Fever/Chills n   Chest Pain n    Poor Appetite y   Edema n    Cough n   Abdominal Pain n    Sputum n   Joint Pain y Left hip pain   SOB/CHOPRA n   Pruritis/Rash     Nausea/vomit    Tolerating PT/OT     Diarrhea    Tolerating Diet     Constipation    Other       Could NOT obtain due to:      Objective:     VITALS:   Last 24hrs VS reviewed since prior progress note.  Most recent are:  Patient Vitals for the past 24 hrs:   Temp Pulse Resp BP SpO2   02/02/23 0810 -- -- -- -- 96 %   02/02/23 0600 -- 76 22 (!) 100/46 97 %   02/02/23 0500 -- 77 22 (!) 107/50 100 %   02/02/23 0400 97.8 °F (36.6 °C) 76 22 (!) 107/50 100 %   02/02/23 0300 -- 76 26 (!) 100/45 100 %   02/02/23 0200 -- 82 28 (!) 110/46 99 %   02/02/23 0100 -- 79 22 (!) 79/61 100 %   02/02/23 0000 98 °F (36.7 °C) 77 21 (!) 97/45 100 %   02/01/23 2300 -- 80 24 (!) 90/40 100 %   02/01/23 2100 -- 93 (!) 40 (!) 130/50 90 %   02/01/23 2000 98.1 °F (36.7 °C) 90 (!) 40 (!) 118/49 93 %   02/01/23 1900 -- 87 (!) 37 122/89 96 %   02/01/23 1800 -- 87 23 93/72 91 %   02/01/23 1700 -- 91 26 137/75 (!) 79 %   02/01/23 1600 97.7 °F (36.5 °C) 81 (!) 36 (!) 135/55 96 %   02/01/23 1415 -- 89 (!) 41 (!) 121/52 91 %   02/01/23 1400 -- 91 23 (!) 135/57 (!) 85 %   02/01/23 1340 -- 86 (!) 37 136/67 93 %   02/01/23 1335 -- 86 (!) 31 130/60 (!) 85 %   02/01/23 1330 -- 86 (!) 36 (!) 126/56 (!) 88 %   02/01/23 1324 -- 83 26 (!) 109/53 90 %   02/01/23 1322 -- 83 (!) 32 (!) 116/51 (!) 87 %   02/01/23 1321 -- 83 (!) 33 (!) 123/52 92 %   02/01/23 1318 -- 80 (!) 32 (!) 117/52 93 %   02/01/23 1300 -- 82 (!) 38 (!) 125/55 96 %   02/01/23 1258 -- 82 (!) 31 (!) 129/48 96 %   02/01/23 1245 98.2 °F (36.8 °C) 84 20 (!) 124/47 92 %   02/01/23 1200 97.7 °F (36.5 °C) 84 19 (!) 122/49 92 %         Intake/Output Summary (Last 24 hours) at 2/2/2023 0827  Last data filed at 2/2/2023 9612  Gross per 24 hour   Intake 1583.75 ml   Output 1800 ml   Net -216.25 ml          I had a face to face encounter and independently examined this patient on 2/2/2023, as outlined below:  PHYSICAL EXAM:  General: Awake, No acute distress  EENT:  EOMI. Anicteric sclerae. MMM  Resp:  Decreased breath sound b/l  CV:  Regular  rhythm,  No edema  GI:  Soft, Non distended, Non tender. +Bowel sounds  Neurologic:  Alert and oriented X 3, normal speech,   Psych:   Not anxious nor agitated  Skin:  No rashes. No jaundice    Reviewed most current lab test results and cultures  YES  Reviewed most current radiology test results   YES  Review and summation of old records today    NO  Reviewed patient's current orders and MAR    YES  PMH/SH reviewed - no change compared to H&P  ________________________________________________________________________  Care Plan discussed with:    Comments   Patient x    Family      RN x    Care Manager     Consultant                        Multidiciplinary team rounds were held today with , nursing, pharmacist and clinical coordinator.   Patient's plan of care was discussed; medications were reviewed and discharge planning was addressed. ________________________________________________________________________  Total NON critical care TIME:  36   Minutes    Total CRITICAL CARE TIME Spent:   Minutes non procedure based      Comments   >50% of visit spent in counseling and coordination of care     ________________________________________________________________________  Ahmet Horne MD     Procedures: see electronic medical records for all procedures/Xrays and details which were not copied into this note but were reviewed prior to creation of Plan. LABS:  I reviewed today's most current labs and imaging studies.   Pertinent labs include:  Recent Labs     02/01/23  0330 01/31/23  1042 01/31/23  0254   WBC 11.2* 11.1* 11.1*   HGB 8.3* 7.5* 6.5*   HCT 27.8* 25.0* 22.2*   * 116* 128*       Recent Labs     02/01/23  0524 02/01/23  0330 01/31/23  0254 01/30/23  2213 01/30/23  1031     --  138 137 136   K 3.6  --  3.9 4.3 3.2*   CL 98  --  98 96* 96*   CO2 34*  --  35* 35* 35*   GLU 83  --  91 120* 161*   BUN 12  --  16 18 21*   CREA 0.78  --  0.89 0.99 1.08*   CA 8.0*  --  7.6* 7.7* 7.3*   MG 2.3  --  2.4  --  2.2   PHOS 2.1*  --  1.8*  --  2.6   INR  --  1.1  --   --   --          Signed: Ahmet Horne MD

## 2023-02-02 NOTE — PROGRESS NOTES
Gastroenterology Progress Note    2/2/2023    Admit Date: 1/21/2023    Subjective: Follow up for: Anemia,+FOBT      Seen sleeping on a Bipap this morning  Patient was seen in rounds by me today. At this time, the patient is resting comfortably. No H/H available for today. No issues after EGD.       Current Facility-Administered Medications   Medication Dose Route Frequency    albuterol-ipratropium (DUO-NEB) 2.5 MG-0.5 MG/3 ML  3 mL Nebulization Q6H RT    L.acidophilus-paracasei-S.thermophil-bifidobacter (RISAQUAD) 8 billion cell capsule  1 Capsule Oral DAILY    vancomycin (VANCOCIN) 1,000 mg in 0.9% sodium chloride 250 mL ([id number])  1,000 mg IntraVENous Q12H    oxyCODONE-acetaminophen (PERCOCET) 5-325 mg per tablet 1 Tablet  1 Tablet Oral Q4H PRN    0.9% sodium chloride infusion 250 mL  250 mL IntraVENous PRN    potassium bicarb-citric acid (EFFER-K) tablet 20 mEq  20 mEq Oral DAILY    folic acid (FOLVITE) 1 mg in 0.9% sodium chloride 50 mL ivpb   IntraVENous DAILY    furosemide (LASIX) tablet 40 mg  40 mg Oral DAILY    0.9% sodium chloride infusion 250 mL  250 mL IntraVENous PRN    midodrine (PROAMATINE) tablet 10 mg  10 mg Oral TID WITH MEALS    [Held by provider] enoxaparin (LOVENOX) injection 30 mg  30 mg SubCUTAneous Q12H    alcohol 62% (NOZIN) nasal  1 Ampule  1 Ampule Topical Q12H    insulin lispro (HUMALOG) injection   SubCUTAneous Q6H    glucose chewable tablet 16 g  4 Tablet Oral PRN    glucagon (GLUCAGEN) injection 1 mg  1 mg IntraMUSCular PRN    [Held by provider] metoprolol tartrate (LOPRESSOR) tablet 12.5 mg  12.5 mg Oral BID    atorvastatin (LIPITOR) tablet 10 mg  10 mg Oral QHS    pantoprazole (PROTONIX) tablet 40 mg  40 mg Oral ACB&D    sodium chloride (NS) flush 5-40 mL  5-40 mL IntraVENous Q8H    sodium chloride (NS) flush 5-40 mL  5-40 mL IntraVENous PRN    acetaminophen (TYLENOL) tablet 650 mg  650 mg Oral Q6H PRN    Or    acetaminophen (TYLENOL) suppository 650 mg 650 mg Rectal Q6H PRN    polyethylene glycol (MIRALAX) packet 17 g  17 g Oral DAILY PRN    ondansetron (ZOFRAN ODT) tablet 4 mg  4 mg Oral Q8H PRN    Or    ondansetron (ZOFRAN) injection 4 mg  4 mg IntraVENous Q6H PRN    dextrose 10% infusion 0-250 mL  0-250 mL IntraVENous PRN    balsam peru-castor oiL (VENELEX) ointment   Topical BID        Objective:     Blood pressure (!) 100/46, pulse 76, temperature 97.8 °F (36.6 °C), resp. rate 22, height 5' 3\" (1.6 m), weight 132 kg (291 lb 0.1 oz), SpO2 97 %, not currently breastfeeding. No intake/output data recorded. 01/31 1901 - 02/02 0700  In: 2083.8 [P.O.:50; I.V.:2033.8]  Out: 2900 [Urine:2900]        Physical Examination:       General:Asleep, on a Bipap  HEENT:  Eyes closed      Data Review    Recent Results (from the past 24 hour(s))   GLUCOSE, POC    Collection Time: 02/01/23 12:12 PM   Result Value Ref Range    Glucose (POC) 93 65 - 117 mg/dL    Performed by Dwaine Child RN    GLUCOSE, POC    Collection Time: 02/01/23  5:35 PM   Result Value Ref Range    Glucose (POC) 113 65 - 117 mg/dL    Performed by Dwaine Child RN    GLUCOSE, POC    Collection Time: 02/02/23 12:02 AM   Result Value Ref Range    Glucose (POC) 121 (H) 65 - 117 mg/dL    Performed by Gena Ruiz RN    GLUCOSE, POC    Collection Time: 02/02/23  6:25 AM   Result Value Ref Range    Glucose (POC) 80 65 - 117 mg/dL    Performed by Gena Ruiz RN      Recent Labs     02/01/23  0330 01/31/23  1042   WBC 11.2* 11.1*   HGB 8.3* 7.5*   HCT 27.8* 25.0*   * 116*     Recent Labs     02/01/23  0524 01/31/23  0254 01/30/23  2213 01/30/23  1031    138 137 136   K 3.6 3.9 4.3 3.2*   CL 98 98 96* 96*   CO2 34* 35* 35* 35*   BUN 12 16 18 21*   CREA 0.78 0.89 0.99 1.08*   GLU 83 91 120* 161*   CA 8.0* 7.6* 7.7* 7.3*   MG 2.3 2.4  --  2.2   PHOS 2.1* 1.8*  --  2.6     No results for input(s): AP, TBIL, TP, ALB, GLOB, GGT, AML, LPSE in the last 72 hours.     No lab exists for component: SGOT, GPT, AMYP, HLPSE  Recent Labs     02/01/23  0330   INR 1.1   PTP 11.9*   APTT 29.9      No results for input(s): FE, TIBC, PSAT, FERR in the last 72 hours. Lab Results   Component Value Date/Time    Folate 3.8 (L) 01/23/2023 02:09 PM      No results for input(s): PH, PCO2, PO2 in the last 72 hours. No results for input(s): CPK, CKNDX, TROIQ in the last 72 hours. No lab exists for component: CPKMB  Lab Results   Component Value Date/Time    Cholesterol, total 127 07/26/2020 05:34 AM    HDL Cholesterol 41 07/26/2020 05:34 AM    LDL, calculated 55 07/26/2020 05:34 AM    Triglyceride 155 (H) 07/26/2020 05:34 AM    CHOL/HDL Ratio 3.1 07/26/2020 05:34 AM     No components found for: [place]  Lab Results   Component Value Date/Time    Color YELLOW/STRAW 01/22/2023 12:36 AM    Appearance TURBID (A) 01/22/2023 12:36 AM    Specific gravity 1.027 01/22/2023 12:36 AM    Specific gravity 1.010 05/03/2022 06:41 PM    pH (UA) 5.5 01/22/2023 12:36 AM    Protein TRACE (A) 01/22/2023 12:36 AM    Glucose Negative 01/22/2023 12:36 AM    Ketone Negative 01/22/2023 12:36 AM    Bilirubin Negative 01/22/2023 12:36 AM    Urobilinogen 1.0 01/22/2023 12:36 AM    Nitrites Positive (A) 01/22/2023 12:36 AM    Leukocyte Esterase SMALL (A) 01/22/2023 12:36 AM    Epithelial cells MODERATE (A) 01/22/2023 12:36 AM    Bacteria 2+ (A) 01/22/2023 12:36 AM    WBC 5-10 01/22/2023 12:36 AM    RBC 0-5 01/22/2023 12:36 AM        ROS: -CP, SOB, Dysuria, palpitations, cough. Assessment:    Active Problems:    Symptomatic anemia (1/22/2023)            EGD 2.1.23  Esophagus: The esophageal mucosa in the proximal, mid and distal esophagus is normal.   The squamo-columnar junction is at 44 cm where the Z-line was noted. Stomach: The gastric mucosa has moderately diffuse erythema compatible with gastritis in the body. Multiple biopsies taken.    Antrum has spoke-like radial erythema(This does not look like GAVE though): biopsies taken  The fundus was found to be normal with no lesions noted on retroflexion. The angularis is normal as well. Duodenum:   The bulbar mucosa has patchy erythema with a few erosions. Biopsies obtained. The duodenal folds are normal in D2.   Plan/Discussion:     Isela Tejada is a 60 y.o.  female with PMH significant for chronic hypoxic respiratory failure on 4 L/min home oxygen therapy secondary to COPD, diabetes mellitus, essential hypertension, hyperlipidemia, JOSLYN compliant with CPAP, history  chronic anemia  who was admitted 1/21 with hemoglobin of 5.3. She was heme+ and required PRBCs to keep her hgb optimized. EGD was completed yesterday with gastritis/duodenitis and few duodenal erosions seen. Path pending  Follow hgb,  Keep her on PPI BID and carafate. Colonoscopy, if needed, would be very difficult/risky 2/2 co-morbidities. Signed By: Consuelo Lim.  Angi Hernandez MD    2/2/2023  7:55 AM

## 2023-02-02 NOTE — PROGRESS NOTES
0700- Bedside and Verbal shift change report given to Shelley (oncoming nurse) by Alva Pinto RN (offgoing nurse). Report included the following information SBAR, Kardex, Procedure Summary, Intake/Output, MAR, and Recent Results. 0800- Shift assessment complete; patient resting in bed with no distress on Bipap . Patient is A&Ox4; follows commands; breath sounds diminished with scattered wheezing; NSR; adequate urine o/p; patient has red rash to neck, chest, bial upper arms, abd and back; c/o itching only to chest area; groin, abd fold and perineum is extremely excoriated. 1745- TRANSFER - OUT REPORT:    Verbal report given to Chu(name) on [facility]  being transferred to PCU(unit) for routine progression of care       Report consisted of patients Situation, Background, Assessment and   Recommendations(SBAR). Information from the following report(s) SBAR, Kardex, Intake/Output, MAR, and Recent Results was reviewed with the receiving nurse. Lines:   Peripheral IV 02/01/23 Left;Upper (Active)   Site Assessment Clean, dry, & intact 02/02/23 1600   Phlebitis Assessment 0 02/02/23 1600   Infiltration Assessment 0 02/02/23 1600   Dressing Status Clean, dry, & intact 02/02/23 1600   Dressing Type Tape 02/02/23 1600   Hub Color/Line Status Pink; Infusing 02/02/23 1600   Action Taken Open ports on tubing capped 02/02/23 1600   Alcohol Cap Used Yes 02/02/23 1600       Extended Dwell Peripheral IV (Active)   Criteria for Appropriate Use Limited/no vessel suitable for conventional peripheral access 02/02/23 1200   Site Assessment Clean, dry, & intact 02/02/23 1200   Phlebitis Assessment 0 02/02/23 1200   Infiltration Assessment 0 02/02/23 1200   External Catheter Length (cm) 0 centimeters 02/02/23 0400   Line Status Infusing 02/02/23 1200   Line Care Cap changed 02/02/23 1200   Alcohol Cap Used Yes 02/02/23 1200   Date of Last Dressing Change 02/02/23 02/02/23 1200   Dressing Type Disk with Chlorhexadine gluconate (CHG) 02/02/23 1200   Dressing Status Clean, dry, & intact 02/02/23 1200   Dressing Intervention New dressing 02/02/23 1200        Opportunity for questions and clarification was provided.       Patient transported with:   Monitor  O2 @ 6 liters  Registered Nurse  [facility]

## 2023-02-02 NOTE — PROGRESS NOTES
Hematology Oncology Progress Note    Follow up for: anemia    Chart notes reviewed since last visit.     Case discussed with following:     Patient complains of the following:  Very fatigued, rash is itchyt today    Additional concerns noted by the staff:  NurseShelley    Patient Vitals for the past 24 hrs:   BP Temp Pulse Resp SpO2   02/02/23 0840 -- -- -- -- 96 %   02/02/23 0810 -- -- -- -- 96 %   02/02/23 0600 (!) 100/46 -- 76 22 97 %   02/02/23 0500 (!) 107/50 -- 77 22 100 %   02/02/23 0400 (!) 107/50 97.8 °F (36.6 °C) 76 22 100 %   02/02/23 0300 (!) 100/45 -- 76 26 100 %   02/02/23 0200 (!) 110/46 -- 82 28 99 %   02/02/23 0100 (!) 79/61 -- 79 22 100 %   02/02/23 0000 (!) 97/45 98 °F (36.7 °C) 77 21 100 %   02/01/23 2300 (!) 90/40 -- 80 24 100 %   02/01/23 2100 (!) 130/50 -- 93 (!) 40 90 %   02/01/23 2000 (!) 118/49 98.1 °F (36.7 °C) 90 (!) 40 93 %   02/01/23 1900 122/89 -- 87 (!) 37 96 %   02/01/23 1800 93/72 -- 87 23 91 %   02/01/23 1700 137/75 -- 91 26 (!) 79 %   02/01/23 1600 (!) 135/55 97.7 °F (36.5 °C) 81 (!) 36 96 %   02/01/23 1415 (!) 121/52 -- 89 (!) 41 91 %   02/01/23 1400 (!) 135/57 -- 91 23 (!) 85 %   02/01/23 1340 136/67 -- 86 (!) 37 93 %   02/01/23 1335 130/60 -- 86 (!) 31 (!) 85 %   02/01/23 1330 (!) 126/56 -- 86 (!) 36 (!) 88 %   02/01/23 1324 (!) 109/53 -- 83 26 90 %   02/01/23 1322 (!) 116/51 -- 83 (!) 32 (!) 87 %   02/01/23 1321 (!) 123/52 -- 83 (!) 33 92 %   02/01/23 1318 (!) 117/52 -- 80 (!) 32 93 %   02/01/23 1300 (!) 125/55 -- 82 (!) 38 96 %   02/01/23 1258 (!) 129/48 -- 82 (!) 31 96 %   02/01/23 1245 (!) 124/47 98.2 °F (36.8 °C) 84 20 92 %   02/01/23 1200 (!) 122/49 97.7 °F (36.5 °C) 84 19 92 %       Physical Examination:  Gen some distress  Resp: 6L NC, distant anteriorly  Abd Obese  Skin: bright erythematous macular rash on chest and b/l UE volar surfaces, progressed on chest up higher today  Ext 1-2+ pitting edema b/l       Labs:  Recent Results (from the past 24 hour(s))   GLUCOSE, POC    Collection Time: 02/01/23 12:12 PM   Result Value Ref Range    Glucose (POC) 93 65 - 117 mg/dL    Performed by Tamea Race MARIE    GLUCOSE, POC    Collection Time: 02/01/23  5:35 PM   Result Value Ref Range    Glucose (POC) 113 65 - 117 mg/dL    Performed by Tamea Race MARIE    GLUCOSE, POC    Collection Time: 02/02/23 12:02 AM   Result Value Ref Range    Glucose (POC) 121 (H) 65 - 117 mg/dL    Performed by [address] [place] [address] RN    GLUCOSE, POC    Collection Time: 02/02/23  6:25 AM   Result Value Ref Range    Glucose (POC) 80 65 - 117 mg/dL    Performed by [address] [place] [address] RN       EGD 2/1/23 Dr Pako Eaton:  Findings:      Esophagus: The esophageal mucosa in the proximal, mid and distal esophagus is normal.   The squamo-columnar junction is at 44 cm where the Z-line was noted. Stomach: The gastric mucosa has moderately diffuse erythema compatible with gastritis in the body. Multiple biopsies taken. Antrum has spoke-like radial erythema(This does not look like GAVE though): biopsies taken  The fundus was found to be normal with no lesions noted on retroflexion. The angularis is normal as well. Duodenum:   The bulbar mucosa has patchy erythema with a few erosions. Biopsies obtained. The duodenal folds are normal in D2. No blood seen in the upper GI tract. Therapies:  biopsy of stomach body, antrum  biopsy of duodenal distal bulb    Assessment and Plan:     Anemia:  -folate and iron deficiency, started on folic acid and IV iron given last week.  -transfuse for HBG < 7  -No cbc drawn yet, nurse about to draw  now. -PRBC given 1/30 and 2/13  - Switched folic acid to IV on 1/14- to ensure she is able to absorb supplement, she was still macrocytic and anemia not responding well to prbc, has some diarrhea issues making me question absorption. -GI following, needing to optimize resp status before any attempt at EGD/colonoscopy, no reports of GI bleeding here.   - Labs yesterday still not showing any hemolysis or DIC.    *) Thrombocytopenia:  - DDX: Drug related, infection, HIT. No DIC  - HIT ab pending, was on Lovenox which has lower incidence of HIT but still is possibility, has prior heparin exposure during prior hospital stays. *) Resp distress  -requires bipap ON, and on 6L nC during day    *) Sepsis  - Weaned off Levophed. . On midodrine    *) Rash:   - likely drug rash?, switched zosyn to nafillin and now on Vancomycin, rash worse today  - Rash appeared 1/30 and received zosyn, IV KCL  and blood that day  - No fever

## 2023-02-03 LAB
ANION GAP SERPL CALC-SCNC: 4 MMOL/L (ref 5–15)
BUN SERPL-MCNC: 7 MG/DL (ref 6–20)
BUN/CREAT SERPL: 9 (ref 12–20)
CALCIUM SERPL-MCNC: 8.2 MG/DL (ref 8.5–10.1)
CHLORIDE SERPL-SCNC: 98 MMOL/L (ref 97–108)
CO2 SERPL-SCNC: 36 MMOL/L (ref 21–32)
CREAT SERPL-MCNC: 0.8 MG/DL (ref 0.55–1.02)
CRP SERPL-MCNC: 9.86 MG/DL (ref 0–0.6)
DATE LAST DOSE: ABNORMAL
ERYTHROCYTE [DISTWIDTH] IN BLOOD BY AUTOMATED COUNT: 22.9 % (ref 11.5–14.5)
ERYTHROCYTE [SEDIMENTATION RATE] IN BLOOD: 62 MM/HR (ref 0–30)
GLUCOSE BLD STRIP.AUTO-MCNC: 110 MG/DL (ref 65–117)
GLUCOSE BLD STRIP.AUTO-MCNC: 113 MG/DL (ref 65–117)
GLUCOSE BLD STRIP.AUTO-MCNC: 116 MG/DL (ref 65–117)
GLUCOSE SERPL-MCNC: 114 MG/DL (ref 65–100)
HCT VFR BLD AUTO: 25.1 % (ref 35–47)
HGB BLD-MCNC: 7.3 G/DL (ref 11.5–16)
MCH RBC QN AUTO: 28.9 PG (ref 26–34)
MCHC RBC AUTO-ENTMCNC: 29.1 G/DL (ref 30–36.5)
MCV RBC AUTO: 99.2 FL (ref 80–99)
NRBC # BLD: 0.15 K/UL (ref 0–0.01)
NRBC BLD-RTO: 1.2 PER 100 WBC
PLATELET # BLD AUTO: 154 K/UL (ref 150–400)
PMV BLD AUTO: 11.4 FL (ref 8.9–12.9)
POTASSIUM SERPL-SCNC: 3.5 MMOL/L (ref 3.5–5.1)
RBC # BLD AUTO: 2.53 M/UL (ref 3.8–5.2)
REPORTED DOSE,DOSE: ABNORMAL UNITS
REPORTED DOSE/TIME,TMG: ABNORMAL
SERVICE CMNT-IMP: NORMAL
SODIUM SERPL-SCNC: 138 MMOL/L (ref 136–145)
VANCOMYCIN TROUGH SERPL-MCNC: 31 UG/ML (ref 5–10)
WBC # BLD AUTO: 12.5 K/UL (ref 3.6–11)

## 2023-02-03 PROCEDURE — 74011250637 HC RX REV CODE- 250/637: Performed by: STUDENT IN AN ORGANIZED HEALTH CARE EDUCATION/TRAINING PROGRAM

## 2023-02-03 PROCEDURE — 82962 GLUCOSE BLOOD TEST: CPT

## 2023-02-03 PROCEDURE — 74011000258 HC RX REV CODE- 258: Performed by: STUDENT IN AN ORGANIZED HEALTH CARE EDUCATION/TRAINING PROGRAM

## 2023-02-03 PROCEDURE — 99233 SBSQ HOSP IP/OBS HIGH 50: CPT | Performed by: NURSE PRACTITIONER

## 2023-02-03 PROCEDURE — 80202 ASSAY OF VANCOMYCIN: CPT

## 2023-02-03 PROCEDURE — 74011250637 HC RX REV CODE- 250/637: Performed by: INTERNAL MEDICINE

## 2023-02-03 PROCEDURE — 80048 BASIC METABOLIC PNL TOTAL CA: CPT

## 2023-02-03 PROCEDURE — 94640 AIRWAY INHALATION TREATMENT: CPT

## 2023-02-03 PROCEDURE — 74011000250 HC RX REV CODE- 250: Performed by: INTERNAL MEDICINE

## 2023-02-03 PROCEDURE — 94660 CPAP INITIATION&MGMT: CPT

## 2023-02-03 PROCEDURE — 85027 COMPLETE CBC AUTOMATED: CPT

## 2023-02-03 PROCEDURE — 74011000258 HC RX REV CODE- 258: Performed by: INTERNAL MEDICINE

## 2023-02-03 PROCEDURE — 74011250636 HC RX REV CODE- 250/636: Performed by: INTERNAL MEDICINE

## 2023-02-03 PROCEDURE — 86140 C-REACTIVE PROTEIN: CPT

## 2023-02-03 PROCEDURE — 74011000250 HC RX REV CODE- 250: Performed by: STUDENT IN AN ORGANIZED HEALTH CARE EDUCATION/TRAINING PROGRAM

## 2023-02-03 PROCEDURE — 65270000046 HC RM TELEMETRY

## 2023-02-03 PROCEDURE — 85652 RBC SED RATE AUTOMATED: CPT

## 2023-02-03 PROCEDURE — 74011000250 HC RX REV CODE- 250: Performed by: PHYSICIAN ASSISTANT

## 2023-02-03 PROCEDURE — 74011250636 HC RX REV CODE- 250/636: Performed by: STUDENT IN AN ORGANIZED HEALTH CARE EDUCATION/TRAINING PROGRAM

## 2023-02-03 PROCEDURE — 77010033678 HC OXYGEN DAILY

## 2023-02-03 PROCEDURE — 36415 COLL VENOUS BLD VENIPUNCTURE: CPT

## 2023-02-03 RX ADMIN — SUCRALFATE 1 G: 1 TABLET ORAL at 21:21

## 2023-02-03 RX ADMIN — CASTOR OIL AND BALSAM, PERU: 788; 87 OINTMENT TOPICAL at 09:17

## 2023-02-03 RX ADMIN — IPRATROPIUM BROMIDE AND ALBUTEROL SULFATE 3 ML: 2.5; .5 SOLUTION RESPIRATORY (INHALATION) at 13:38

## 2023-02-03 RX ADMIN — Medication: at 09:20

## 2023-02-03 RX ADMIN — PANTOPRAZOLE SODIUM 40 MG: 40 TABLET, DELAYED RELEASE ORAL at 17:51

## 2023-02-03 RX ADMIN — METOPROLOL TARTRATE 12.5 MG: 25 TABLET, FILM COATED ORAL at 17:51

## 2023-02-03 RX ADMIN — POTASSIUM BICARBONATE 20 MEQ: 391 TABLET, EFFERVESCENT ORAL at 09:13

## 2023-02-03 RX ADMIN — IPRATROPIUM BROMIDE AND ALBUTEROL SULFATE 3 ML: 2.5; .5 SOLUTION RESPIRATORY (INHALATION) at 07:28

## 2023-02-03 RX ADMIN — FUROSEMIDE 40 MG: 40 TABLET ORAL at 09:13

## 2023-02-03 RX ADMIN — CASTOR OIL AND BALSAM, PERU: 788; 87 OINTMENT TOPICAL at 22:16

## 2023-02-03 RX ADMIN — Medication: at 18:03

## 2023-02-03 RX ADMIN — CAMPHOR, MENTHOL: .5; .5 LOTION TOPICAL at 09:18

## 2023-02-03 RX ADMIN — MIDODRINE HYDROCHLORIDE 10 MG: 5 TABLET ORAL at 09:13

## 2023-02-03 RX ADMIN — MIDODRINE HYDROCHLORIDE 10 MG: 5 TABLET ORAL at 17:51

## 2023-02-03 RX ADMIN — SUCRALFATE 1 G: 1 TABLET ORAL at 11:28

## 2023-02-03 RX ADMIN — Medication 1 AMPULE: at 09:16

## 2023-02-03 RX ADMIN — ATORVASTATIN CALCIUM 10 MG: 10 TABLET, FILM COATED ORAL at 21:21

## 2023-02-03 RX ADMIN — SODIUM CHLORIDE, PRESERVATIVE FREE 10 ML: 5 INJECTION INTRAVENOUS at 22:17

## 2023-02-03 RX ADMIN — SODIUM CHLORIDE, PRESERVATIVE FREE 10 ML: 5 INJECTION INTRAVENOUS at 17:50

## 2023-02-03 RX ADMIN — IPRATROPIUM BROMIDE AND ALBUTEROL SULFATE 3 ML: 2.5; .5 SOLUTION RESPIRATORY (INHALATION) at 19:19

## 2023-02-03 RX ADMIN — SUCRALFATE 1 G: 1 TABLET ORAL at 09:12

## 2023-02-03 RX ADMIN — IPRATROPIUM BROMIDE AND ALBUTEROL SULFATE 3 ML: 2.5; .5 SOLUTION RESPIRATORY (INHALATION) at 01:54

## 2023-02-03 RX ADMIN — FOLIC ACID: 5 INJECTION, SOLUTION INTRAMUSCULAR; INTRAVENOUS; SUBCUTANEOUS at 11:28

## 2023-02-03 RX ADMIN — VANCOMYCIN HYDROCHLORIDE 500 MG: 500 INJECTION, POWDER, LYOPHILIZED, FOR SOLUTION INTRAVENOUS at 22:15

## 2023-02-03 RX ADMIN — SODIUM CHLORIDE, PRESERVATIVE FREE 10 ML: 5 INJECTION INTRAVENOUS at 06:51

## 2023-02-03 RX ADMIN — VANCOMYCIN HYDROCHLORIDE 1000 MG: 1 INJECTION, POWDER, LYOPHILIZED, FOR SOLUTION INTRAVENOUS at 01:29

## 2023-02-03 RX ADMIN — Medication 1 CAPSULE: at 09:12

## 2023-02-03 RX ADMIN — CAMPHOR, MENTHOL: .5; .5 LOTION TOPICAL at 22:16

## 2023-02-03 RX ADMIN — PANTOPRAZOLE SODIUM 40 MG: 40 TABLET, DELAYED RELEASE ORAL at 09:13

## 2023-02-03 RX ADMIN — ONDANSETRON 4 MG: 4 TABLET, ORALLY DISINTEGRATING ORAL at 21:21

## 2023-02-03 RX ADMIN — SUCRALFATE 1 G: 1 TABLET ORAL at 17:51

## 2023-02-03 NOTE — PROGRESS NOTES
Comprehensive Nutrition Assessment    Type and Reason for Visit: Reassess    Nutrition Recommendations/Plan:   Continue current diet  Add ensure pudding, magic cup, and ensure compact daily     Nutrition Assessment:    Chart reviewed; patient reports a good appetite. Doesn't always like the food and is particular about what she eats. Doesn't care for much meat but loves mashed potatoes and certain vegetables. She had ensure high protein ordered previously but she doesn't really care for it. Agreeable to trying other ONS to help with protein intake. Menu provided. Encouraged intake of meals. Nutrition Related Findings:    Labs reviewed   BM 2/2   2-3+ edema   Atorvastatin, Folic Acid, Lasix, humalog, Risaquad, Midodrine, Protonix, Effer-K, Carafate   Wound Type: Deep tissue injury, Skin tears    Current Nutrition Intake & Therapies:  Average Meal Intake: 51-75%  Average Supplement Intake: 51-75%  ADULT DIET Regular; Low Fat/Low Chol/High Fiber/2 gm Na; Low Sodium (2 gm); Likes peas, carrots, mashed potatoes, chocolate ensure high protein please    Anthropometric Measures:  Height: 5' 3\" (160 cm)  Ideal Body Weight (IBW): 115 lbs (52 kg)     Current Body Wt:  132 kg (291 lb 0.1 oz), 253 % IBW. Bed scale  Current BMI (kg/m2): 51.6                          BMI Category: Obese class 3 (BMI 40.0 or greater)    Estimated Daily Nutrient Needs:  Energy Requirements Based On: Formula  Weight Used for Energy Requirements: Current  Energy (kcal/day): 1964 kcals (BMR x 1. 2AF -250kcals)  Weight Used for Protein Requirements: Current  Protein (g/day): 106g (0.8 g/kg bw)  Method Used for Fluid Requirements: Other (comment)  Fluid (ml/day): 1800 mL or per MD    Nutrition Diagnosis:   No nutrition diagnosis at this time    Nutrition Interventions:   Food and/or Nutrient Delivery: Continue current diet, Start oral nutrition supplement  Nutrition Education/Counseling: No recommendations at this time  Coordination of Nutrition Care: Continue to monitor while inpatient       Goals:  Previous Goal Met: Goal(s) achieved  Goals: PO intake 75% or greater, by next RD assessment       Nutrition Monitoring and Evaluation:   Behavioral-Environmental Outcomes: None identified  Food/Nutrient Intake Outcomes: Food and nutrient intake, Supplement intake  Physical Signs/Symptoms Outcomes: Biochemical data, Weight    Discharge Planning:    Continue current diet, Continue oral nutrition supplement    Susanna Pratt RD  Contact: ext [phone]

## 2023-02-03 NOTE — PROGRESS NOTES
Infectious Disease Progress      Impression    MSSA bacteremia persistent  Blood cultures 1/21+ for MSSA 1/1-RA  Blood cultures 1/25+ for MSSA 2/4-L/hand  Blood cultures 1/26+ for MSSA 1/4- L/hand  Negative cultures 1/27, 1/29  Echocardiogram 1/25-negative for vegetation, poor study  Per report. For consideration of  YANNA. Left hip pain   Improving  X-ray -di splaced, rotated, left femoral neck fracture  CT of pelvis ordered. Report as follows:  Chronic nonunited left femoral neck fracture. Adjacent moderate hip joint  osteoarthritis with large complex joint effusion. Severe right hip joint osteophytosis with small complex joint effusion. D/w Ortho regarding aspiration, deferred to IR  S/p attempted aspiration by IR 2/1,  unsuccessful. L /femoral neck fracture, chronic  MRI of  hip -5/3/2022 +for left femoral neck displaced fracture with fluid in the fracture gap s.  ?  Healing fracture with possible superimposed osteomyelitis/osteonecrosis. S/p Fluid aspiration by IR on 5/5/22  Cultures-NG. No organisms on GS. Recommendation was for antibiotics iv. Patient was initially on on daptomycin and cefepime iv with plans for 6 weeks of therapy. Patient was readmitted in June2022 . Antibiotic therapy was completed with Zosyn IV. Macular rash   on chest, upper abdomen &   Extremities  Drug induced  Still present. For Sarna cream for itching. Acute on chronic respiratory failure  On BiPAP, O2 by [place] ICU team     S/p episode of V. Tach on 1/29   2ry to severe hypokalemia  Currently on low-dose pressors  Cardiology following      Chronic anemia  S/p Hb 5.3 on admission  Folate, iron deficiency  Hematology following. S/p EGD  Findings of gastritis, duodenitis.     R/  big toe infection s/p nail avulsion  Wound is clean  Continue Daily wound care    Morbid obesity  BMI 51.55     Plan  Continue vancomycin   Pharmacy to dose per Cr clearance  Monitor Cr , daily probiotic  Daptomycin would be safer for the long duration given  previous Cr elevations   If approved, give 1 dose of daptomycin prior to discharge  Patient will require total of 6 weeks of MSSA therapy  IV  YANNA-If poor visualization of valves on echo  Aspiration precautions. D/w Hospitalist -family meeting to discuss hospice planned. Will sign off-please reconsult as needed. Antimicrobial orders for discharge  -Daptomycin 8 mg/kg  IV daily x  weeks end date 3/10  -Pull PICC at end of therapy on/after 3/10  -Weekly CBC, CMP, CK & ESR/CRP every other week  -Fax reports to [phone], call with critical labs  at [phone]  -Encourage adequate fluids, daily probiotic/yogurt  -If PICC malfunction occurs and home health cannot reposition  please send patient to ED immediately  -ID follow-up -2/21 at 2 PM virtual.          Kevin Osorio is a 60 y.o.  female with PMH significant for chronic hypoxic respiratory failure on 4 L/min home oxygen therapy secondary to COPD, diabetes mellitus, essential hypertension, hyperlipidemia, JOSLYN compliant with CPAP, history duodenal ulcers, chronic anemia of indeterminate etiology who was admitted 1/21 with hemoglobin of 5.3. Patient is known to ID service from previous admission back in May and June /2022. Patient had chronic displaced left femoral  neck fracture. MRI of left hip showed left femoral neck displaced fracture with fluid in the fracture gap and edema/enhancement and surrounding marrow and soft tissues. Findings may reflect a healing fracture with possible superimposed osteomyelitis/osteonecrosis. S/p Fluid aspiration by IR on 5/5  Cultures-NG. No organisms on GS. No plans for Surgery per Ortho. Recommendation was for antibiotics iv. Patient was placed on daptomycin and cefepime   X 6 weeks with end date 6/16. She was readmitted in June and changed to Zosyn IV during that admission.   Patient completed 6 weeks of antibiotic therapy  Patient was initially admitted to the hospitalist service on this admission. Thereafter admitted to ICU. Patient has been requiring progressively high concentrations of O2. Patient was placed on BiPAP and transferred to ICU. Patient had episode of hypotension on 1/26  And has been on pressors. Patient had episode of V. tach on 1/29. Severe hypokalemia was because of arrhythmia per cardiology  Blood cultures have been positive for MSSA. Blood cultures on 1/21+ for MSSA 1/1-RA blood cultures on 1/25+ for MSSA 2/4 left hand, 1/26 1/4. Negative cultures on 1/27, 1/29. Echocardiogram on 1/25 negative for vegetation. Poor image quality. Patient was on nafcillin IV. Changed to Zosyn IV on 1/ 29 due to concern for pulmonary infiltrates and persistent hypotension. ID service has been consulted. Patient seen today. S/p attempted aspiration by IR  Intense macular rash+  Pain in left hip less. Denies cough  D/w RN events of the day . D/w Pharmacy.      Active Hospital Problems    Diagnosis Date Noted    Symptomatic anemia 01/22/2023         Past Medical History:   Diagnosis Date    Acute on chronic respiratory failure with hypoxia and hypercapnia (HCC) 11/20/2020    Atrial fibrillation ([facility] [address] 11/4/2020    CHF (congestive heart failure) (HCC)     Diabetes (HCC)     Hypertension     Ill-defined condition     high cholesterol    Ill-defined condition     tachycardia    JOSLYN (obstructive sleep apnea) 11/20/2020    Other and unspecified symptoms and signs involving general sensations and perceptions     ruptured disc    S/P cardiac cath 11/4/2020    11/3/2020 nonobstructive disease       Past Surgical History:   Procedure Laterality Date    MA COMPRE ELECTROPHYSIOL XM W/LEFT ATRIAL PACNG/REC N/A 11/23/2020    Lt Atrial Pace & Record During Ep Study performed by Daniel Wild MD at [facility] CARDIAC CATH LAB    MA COMPRE EP EVAL ABLTJ 3D MAPG TX SVT N/A 11/23/2020    ABLATION A-FLUTTER performed by Daniel Wild MD at [facility] CARDIAC CATH LAB    [address] [facility] ADD ON N/A 11/23/2020    Ablation Svt/Vt Add On performed by Saray Alvarenga MD at [facility] CARDIAC CATH LAB    DE INTRACARDIAC ELECTROPHYSIOLOGIC 3D MAPPING N/A 11/23/2020    Ep 3d Mapping performed by Saray Alvarenga MD at [facility] CARDIAC CATH LAB    DE PROGRAMMED STIMJ & PACG AFTER IV DRUG NFS N/A 11/23/2020    Drug Stimulation performed by Saray Alvarenga MD at [facility] CARDIAC CATH LAB       Allergies   Allergen Reactions    Cefepime Rash     tolerated nafcillin 1/2023    Nafcillin Rash    Cefazolin Rash    Other Plant, Animal, Environmental Rash     Ivory soap caused rash on hands. Zosyn [Piperacillin-Tazobactam] Rash     Possible rash to zosyn, also received IV contrast and blood  Tolerated nafcillin before/after 1/2023       Social Connections: Not on file       No family status information on file. Medication Documentation Review Audit       Reviewed by Ashleigh Ba RN (Registered Nurse) on 01/22/23 at [facility]      Medication Sig Documenting Provider Last Dose Status Taking?   acetaminophen (TYLENOL) 325 mg tablet Take 650 mg by mouth every six (6) hours as needed for Pain. Provider, Historical  Active    albuterol-ipratropium (DUO-NEB) 2.5 mg-0.5 mg/3 ml nebu 3 mL by Nebulization route every four (4) hours as needed for Wheezing, Shortness of Breath or Respiratory Distress. Jadyn Olson MD  Active    amiodarone (CORDARONE) 200 mg tablet Take  by mouth. Provider, Historical 1/21/2023 Active Yes   atorvastatin (LIPITOR) 10 mg tablet Take 1 Tablet by mouth nightly. START AFTER COMPLETING ANTIBIOTICS TREATMENT ON 6/17 Shi Fontaine MD 1/21/2023 Active Yes   clotrimazole-betamethasone (LOTRISONE) topical cream Apply at affected area Reva Aschoff, MD  Active    furosemide (LASIX) 20 mg tablet Take  by mouth daily. Provider, Historical 1/21/2023 Active Yes   metoprolol (LOPRESSOR) 25 mg tablet Take 25 mg by mouth two (2) times a day.  Russ Cano MD 1/21/2023 Active Yes   pantoprazole (PROTONIX) 40 mg tablet Take 1 Tablet by mouth Daily (before breakfast). Patient not taking: Reported on 1/22/2023    Hawa Pat MD Not Taking Active No   rivaroxaban (Xarelto) 20 mg tab tablet Take  by mouth daily. Provider, Historical 1/21/2023 Active Yes                      Review of Systems - Negative except those mentioned in H&P      PHYSICAL EXAM:  General:          Resting, arousable, cooperative, no acute distress    EENT:              EOMI. Anicteric sclerae. MMM  Resp:               CTA bilaterally, no wheezing or rales. No accessory muscle use  CV:                  Regular  rhythm,  No edema  GI:                   Soft, Non distended, Non tender. +Bowel sounds  Neurologic:      Alert and oriented X 3, normal speech,   Psych:             Good insight. Not anxious nor agitated  Skin:                Intense erythematous rash on chest, upper abd & UEExtremities     Tender over L/hip, mild warmth+ R/hip -nontender.     Jose Monroy MD [id number]

## 2023-02-03 NOTE — PROGRESS NOTES
Hematology Oncology Progress Note    Follow up for: anemia    Chart notes reviewed since last visit.     Case discussed with following:     Patient complains of the following:  Very fatigued, moved out of ICU, on 6L NC, rash looks a little better    Additional concerns noted by the staff:      Patient Vitals for the past 24 hrs:   BP Temp Pulse Resp SpO2   02/03/23 0913 (!) 136/57 -- 89 -- --   02/03/23 0730 -- -- -- -- 97 %   02/03/23 0516 -- -- -- -- 92 %   02/03/23 0309 (!) 108/47 98.3 °F (36.8 °C) 79 23 93 %   02/03/23 0203 -- -- -- -- 93 %   02/03/23 0155 -- -- -- -- 92 %   02/02/23 2211 (!) 124/49 98.2 °F (36.8 °C) 91 27 91 %   02/02/23 1933 -- -- -- -- 95 %   02/02/23 1911 (!) 141/50 98.6 °F (37 °C) 86 26 95 %   02/02/23 1851 (!) 129/51 99.1 °F (37.3 °C) 92 26 91 %   02/02/23 1600 (!) 112/48 -- 76 20 96 %   02/02/23 1521 -- -- -- -- 99 %   02/02/23 1500 (!) 126/50 -- 87 (!) 39 100 %   02/02/23 1445 -- -- -- -- 93 %   02/02/23 1400 (!) 124/54 -- 87 (!) 38 94 %   02/02/23 1300 119/61 -- 86 21 99 %   02/02/23 1210 -- -- 88 20 98 %   02/02/23 1200 (!) 127/57 97.8 °F (36.6 °C) 90 (!) 31 99 %   02/02/23 1100 127/60 -- 88 27 98 %   02/02/23 1000 112/72 -- 91 (!) 32 93 %       Physical Examination:  Gen some distress  Resp: 6L NC, distant anteriorly  Abd Obese  Skin: bright erythematous macular rash on chest and b/l UE volar surfaces, progressed on chest up higher today  Ext 1-2+ pitting edema b/l       Labs:  Recent Results (from the past 24 hour(s))   METABOLIC PANEL, BASIC    Collection Time: 02/02/23  9:39 AM   Result Value Ref Range    Sodium 140 136 - 145 mmol/L    Potassium 3.6 3.5 - 5.1 mmol/L    Chloride 98 97 - 108 mmol/L    CO2 35 (H) 21 - 32 mmol/L    Anion gap 7 5 - 15 mmol/L    Glucose 91 65 - 100 mg/dL    BUN 9 6 - 20 MG/DL    Creatinine 0.87 0.55 - 1.02 MG/DL    BUN/Creatinine ratio 10 (L) 12 - 20      eGFR >60 >60 ml/min/1.73m2    Calcium 8.2 (L) 8.5 - 10.1 MG/DL   CBC W/O DIFF    Collection Time: 02/02/23  9:39 AM   Result Value Ref Range    WBC 13.4 (H) 3.6 - 11.0 K/uL    RBC 2.66 (L) 3.80 - 5.20 M/uL    HGB 7.8 (L) 11.5 - 16.0 g/dL    HCT 26.1 (L) 35.0 - 47.0 %    MCV 98.1 80.0 - 99.0 FL    MCH 29.3 26.0 - 34.0 PG    MCHC 29.9 (L) 30.0 - 36.5 g/dL    RDW 22.8 (H) 11.5 - 14.5 %    PLATELET 979 (L) 455 - 400 K/uL    MPV 11.6 8.9 - 12.9 FL    NRBC 0.7 (H) 0  WBC    ABSOLUTE NRBC 0.10 (H) 0.00 - 0.01 K/uL   GLUCOSE, POC    Collection Time: 02/02/23 12:59 PM   Result Value Ref Range    Glucose (POC) 120 (H) 65 - 117 mg/dL    Performed by [employer]    GLUCOSE, POC    Collection Time: 02/02/23  6:08 PM   Result Value Ref Range    Glucose (POC) 93 65 - 117 mg/dL    Performed by [employer]    GLUCOSE, POC    Collection Time: 02/02/23 11:54 PM   Result Value Ref Range    Glucose (POC) 124 (H) 65 - 117 mg/dL    Performed by Riddhi Diggs RN    METABOLIC PANEL, BASIC    Collection Time: 02/03/23  1:13 AM   Result Value Ref Range    Sodium 138 136 - 145 mmol/L    Potassium 3.5 3.5 - 5.1 mmol/L    Chloride 98 97 - 108 mmol/L    CO2 36 (H) 21 - 32 mmol/L    Anion gap 4 (L) 5 - 15 mmol/L    Glucose 114 (H) 65 - 100 mg/dL    BUN 7 6 - 20 MG/DL    Creatinine 0.80 0.55 - 1.02 MG/DL    BUN/Creatinine ratio 9 (L) 12 - 20      eGFR >60 >60 ml/min/1.73m2    Calcium 8.2 (L) 8.5 - 10.1 MG/DL   CBC W/O DIFF    Collection Time: 02/03/23  1:13 AM   Result Value Ref Range    WBC 12.5 (H) 3.6 - 11.0 K/uL    RBC 2.53 (L) 3.80 - 5.20 M/uL    HGB 7.3 (L) 11.5 - 16.0 g/dL    HCT 25.1 (L) 35.0 - 47.0 %    MCV 99.2 (H) 80.0 - 99.0 FL    MCH 28.9 26.0 - 34.0 PG    MCHC 29.1 (L) 30.0 - 36.5 g/dL    RDW 22.9 (H) 11.5 - 14.5 %    PLATELET 037 512 - 827 K/uL    MPV 11.4 8.9 - 12.9 FL    NRBC 1.2 (H) 0  WBC    ABSOLUTE NRBC 0.15 (H) 0.00 - 0.01 K/uL   VANCOMYCIN, TROUGH    Collection Time: 02/03/23  1:13 AM   Result Value Ref Range    Vancomycin,trough 31.0 (HH) 5.0 - 10.0 ug/mL    Reported dose date NOT PROVIDED      Reported dose time: NOT PROVIDED      Reported dose: NOT PROVIDED UNITS   SED RATE (ESR)    Collection Time: 02/03/23  1:13 AM   Result Value Ref Range    Sed rate, automated 62 (H) 0 - 30 mm/hr   C REACTIVE PROTEIN, QT    Collection Time: 02/03/23  1:13 AM   Result Value Ref Range    C-Reactive protein 9.86 (H) 0.00 - 0.60 mg/dL   GLUCOSE, POC    Collection Time: 02/03/23  6:21 AM   Result Value Ref Range    Glucose (POC) 110 65 - 117 mg/dL    Performed by Neftaly Gilman RN       EGD 2/1/23 Dr Johnson Prude:  Findings:      Esophagus: The esophageal mucosa in the proximal, mid and distal esophagus is normal.   The squamo-columnar junction is at 44 cm where the Z-line was noted. Stomach: The gastric mucosa has moderately diffuse erythema compatible with gastritis in the body. Multiple biopsies taken. Antrum has spoke-like radial erythema(This does not look like GAVE though): biopsies taken  The fundus was found to be normal with no lesions noted on retroflexion. The angularis is normal as well. Duodenum:   The bulbar mucosa has patchy erythema with a few erosions. Biopsies obtained. The duodenal folds are normal in D2. No blood seen in the upper GI tract. Therapies:  biopsy of stomach body, antrum  biopsy of duodenal distal bulb    Assessment and Plan:     Anemia:  -folate and iron deficiency, started on folic acid and IV iron given last week.  -transfuse for HBG < 7  -Hg 7.3 today  -PRBC given 1/30 and 9/48  - Switched folic acid to IV on 8/48- to ensure she is able to absorb supplement, she was still macrocytic and anemia not responding well to prbc, has some diarrhea issues making me question absorption. -GI following, needing to optimize resp status before any attempt at EGD/colonoscopy, no reports of GI bleeding here.   - Labs repeated 1/31 ;and still not showing any hemolysis or DIC.  -Noted plans to possible move to hospice pending mtg later today    *) Thrombocytopenia-improved:  - DDX: Drug related, infection, No DIC  - HIT ab negative, plt improved. *) Resp distress  -requires bipap ON, and on 6L nC during day    *) Sepsis  - Weaned off Levophed. . On midodrine    *) Rash:   - likely drug rash?, switched zosyn to nafillin and now on Vancomycin, rash worse today  - Rash appeared 1/30 and received zosyn, IV KCL  and blood that day. - Rash looks a little less erythematous today  - No fever        Please call with questions over weekend, otherwise our service will  on Monday if still here.

## 2023-02-03 NOTE — PROGRESS NOTES
End of Shift Note    Bedside shift change report given to Roslyn Ibrahim (oncoming nurse) by Aneta Gonzalez (offgoing nurse). Report included the following information SBAR, Kardex, Procedure Summary, Intake/Output, MAR, Recent Results, and Cardiac Rhythm NSR    Shift worked:  7 am to 7 pm     Shift summary and any significant changes:    Pt arrived to unit ~1845. Dual skin completed. Pt to have palliative meeting tomorrow at 11:30 am.      Concerns for physician to address:  none     Zone phone for oncoming shift:  [phone]       Activity:  Activity Level: Bed Rest  Number times ambulated in hallways past shift: 0  Number of times OOB to chair past shift: 0    Cardiac:   Cardiac Monitoring: Yes      Cardiac Rhythm: Sinus Rhythm    Access:  Current line(s): PIV     Genitourinary:   Urinary status: voiding and external catheter    Respiratory:   O2 Device: Nasal cannula  Chronic home O2 use?: YES  Incentive spirometer at bedside:     GI:  Last Bowel Movement Date: 02/01/23  Current diet:  ADULT DIET Regular; Low Fat/Low Chol/High Fiber/2 gm Na; Low Sodium (2 gm); Likes peas, carrots, mashed potatoes, chocolate ensure high protein please  Passing flatus: Tolerating current diet: YES       Pain Management:   Patient states pain is manageable on current regimen: N/A    Skin:  Gaston Score: 12  Interventions: turn team, speciality bed, float heels, and internal/external urinary devices    Patient Safety:  Fall Score:  Total Score: 1  Interventions: bed/chair alarm       Length of Stay:  Expected LOS: 2d 16h  Actual LOS: [address] [facility]

## 2023-02-03 NOTE — PROGRESS NOTES
Pharmacy Automatic Renal Dosing Protocol - Antimicrobials    Indication for Antimicrobials: bacteremia     Current Regimen of Each Antimicrobial:   Vancomycin - pharmacy to dose; started ; day 3    Previous Antimicrobial Therapy:   nafcillin -  zosyn -  Nafcillin -    Goal Level: VANCOMYCIN TROUGH GOAL RANGE    Vancomycin Trough: 15 - 20 mcg/mL  (AUC: 400 - 600 mg/hr/Liter/day)     Date Dose & Interval Measured (mcg/mL) Extrapolated (mcg/mL)   2/3 0113 1g q 12 h 31 89/29.2                 Significant Cultures:    blood cx MSSA   blood cx MSSA   blood cx MSSA   paired blood cx: NGTD, pending    blood cx NGTD pending   paired blood cx: NGTD, pending       Paralysis, amputations, malnutrition: -    Labs:  Recent Labs     23  0113 23  0939 23  0524 23  0330   CREA 0.80 0.87 0.78  --    BUN 7 9 12  --    WBC 12.5* 13.4*  --  11.2*     Temp (24hrs), Av.5 °F (36.9 °C), Min:98.2 °F (36.8 °C), Max:99.1 °F (37.3 °C)    Creatinine Clearance (mL/min) or Dialysis: 59.5 mL/min (IBW)    Impression/Plan:   Scr stable  Vancomycin  level elevated, will reduce the dose to 500 mg BID for an estimated AUC of 457/14.9. MSSA, but allergic to cefazolin and nafcillin   Will follow the repeat blood cx  Antimicrobial stop date pending     Pharmacy will follow daily and adjust medications as appropriate for renal function and/or serum levels.     Thank you,  Bartolo Kingston, RADHAD

## 2023-02-03 NOTE — PROGRESS NOTES
ADULT PROTOCOL: JET AEROSOL ASSESSMENT    Patient  Rena Pepe     60 y.o.   female     2/3/2023  1:41 PM    Breath Sounds Pre Procedure: Right Breath Sounds: Diminished                               Left Breath Sounds: Diminished    Breath Sounds Post Procedure: Right Breath Sounds: Diminished                                 Left Breath Sounds: Diminished    Breathing pattern: Pre procedure Breathing Pattern: Regular          Post procedure Breathing Pattern: Regular    Heart Rate: Pre procedure Pulse: 89           Post procedure Pulse: 79    Resp Rate: Pre procedure Respirations: 22           Post procedure Respirations: 18    Cough: Pre procedure                 Post procedure      Oxygen: O2 Device: Nasal cannula   O2 Flow Rate (L/min): 4 l/min         SpO2: Pre procedure SpO2: 100 %   with oxygen              Post procedure SpO2: 98 %  with oxygen    Nebulizer Therapy: Current medications Aerosolized Medications: DuoNeb      Changed: NO    Pt on home regiment.      Smoking History:   Social History     Tobacco Use   Smoking Status Former   Smokeless Tobacco Never       Problem List:   Patient Active Problem List   Diagnosis Code    COVID-19 ruled out Z20.822    [facility]) I48.91    S/P cardiac cath Z98.890    Mixed hyperlipidemia E78.2    Morbid obesity ([facility] [address] E66.01    Acute respiratory failure (HCC) J96.00    Atrial flutter with rapid ventricular response (HCC) I48.92    JOSLYN (obstructive sleep apnea) G47.33    Acute on chronic respiratory failure with hypoxia and hypercapnia (HCC) J96.21, J96.22    SOB (shortness of breath) R06.02    Physical deconditioning R53.81    Counseling regarding advance care planning and goals of care Z71.89    Chronic pain G89.29    Dependence on supplemental oxygen Z99.81    Essential hypertension I10    Heart failure ([facility] [address] I50.9    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

## 2023-02-03 NOTE — PROGRESS NOTES
I have examined the patient. I have reviewed the chart and agree with the documentation recorded by the ALEAH, including the assessment, treatment plan, and disposition. Patient seen and examined by me. I personally performed all components of the history, physical, and medical decision making and agree with the assessment and plan with minor modifications as noted. Exam:  Alert. Moved to floor  HEENT AT  GI: soft w/ normal bowel sounds    A:         Symptomatic anemia (1/22/2023)    EGD 2.1.23  Esophagus: The esophageal mucosa in the proximal, mid and distal esophagus is normal.   The squamo-columnar junction is at 44 cm where the Z-line was noted. Stomach: The gastric mucosa has moderately diffuse erythema compatible with gastritis in the body. Multiple biopsies taken. Antrum has spoke-like radial erythema(This does not look like GAVE though): biopsies taken  The fundus was found to be normal with no lesions noted on retroflexion. The angularis is normal as well. Duodenum:   The bulbar mucosa has patchy erythema with a few erosions. Biopsies obtained. The duodenal folds are normal in D2.   Plan/Discussion:      Tierney Frye is a 60 y.o.  female with PMH significant for chronic hypoxic respiratory failure on 4 L/min home oxygen therapy secondary to COPD, diabetes mellitus, essential hypertension, hyperlipidemia, JOSLYN compliant with CPAP, history  chronic anemia  who was admitted 1/21 with hemoglobin of 5.3. She was heme+ and required PRBCs to keep her hgb optimized. EGD was completed  with gastritis/duodenitis and few duodenal erosions seen. Path shows reactive gastropathy only  Follow hgb daily,  Keep her on PPI BID and carafate. Colonoscopy,  would be very difficult/risky 2/2 co-morbidities. We will sign off the case. Please call us back with questions. Kris Harris MD  [address] [facility])                                    Progress Note  Date:2/3/2023       Room:[address] [place] [address]  Patient Bk Hsu     Date of Birth:[birth year]     Age:62 y.o. Subjective    Subjective:  Symptoms:  She reports weakness. Diet:  Adequate intake. No nausea or vomiting. Pain:  She reports no pain. Review of Systems   Constitutional:  Negative for appetite change and fever. Gastrointestinal:  Negative for abdominal pain, blood in stool, nausea and vomiting. Skin:  Negative for pallor. Neurological:  Positive for weakness. Objective         Vitals Last 24 Hours:  TEMPERATURE:  Temp  Av.4 °F (36.9 °C)  Min: 97.8 °F (36.6 °C)  Max: 99.1 °F (37.3 °C)  RESPIRATIONS RANGE: Resp  Av.5  Min: 20  Max: 39  PULSE OXIMETRY RANGE: SpO2  Av.2 %  Min: 91 %  Max: 100 %  PULSE RANGE: Pulse  Av.9  Min: 76  Max: 92  BLOOD PRESSURE RANGE: Systolic (05AIC), JBL:128 , Min:108 , TSX:033   ; Diastolic (55VAI), PJV:56, Min:47, Max:72    I/O (24Hr): Intake/Output Summary (Last 24 hours) at 2/3/2023 0951  Last data filed at 2/3/2023 0309  Gross per 24 hour   Intake 220 ml   Output 2200 ml   Net -1980 ml     Objective:  General Appearance:  Comfortable and not in pain. Vital signs: (most recent): Blood pressure (!) 136/57, pulse 89, temperature 98.3 °F (36.8 °C), resp. rate 23, height 5' 3\" (1.6 m), weight 132 kg (291 lb 0.1 oz), SpO2 97 %, not currently breastfeeding. No fever. Output: Producing stool. HEENT: Normal HEENT exam.    Lungs:  Normal effort and normal respiratory rate. Breath sounds clear to auscultation. Heart: Normal rate. Regular rhythm. S1 normal and S2 normal.  No murmur. Abdomen: Abdomen is soft and non-distended. Bowel sounds are normal.   There is generalized tenderness. Extremities: Normal range of motion. There is dependent edema. Pulses: Distal pulses are intact. Neurological: Patient is alert and oriented to person, place and time. Pupils:  Pupils are equal, round, and reactive to light. Skin:  Warm and dry. (Erythematous rash trunk and UE)  Labs/Imaging/Diagnostics    Labs:  CBC:  Recent Labs     02/03/23  0113 02/02/23  0939 02/01/23  0330   WBC 12.5* 13.4* 11.2*   RBC 2.53* 2.66* 2.85*   HGB 7.3* 7.8* 8.3*   HCT 25.1* 26.1* 27.8*   MCV 99.2* 98.1 97.5   RDW 22.9* 22.8* 23.0*    141* 115*     CHEMISTRIES:  Recent Labs     02/03/23  0113 02/02/23  0939 02/01/23  0524    140 138   K 3.5 3.6 3.6   CL 98 98 98   CO2 36* 35* 34*   BUN 7 9 12   CA 8.2* 8.2* 8.0*   PHOS  --   --  2.1*   MG  --   --  2.3   PT/INR:  Recent Labs     02/01/23  0330   INR 1.1     APTT:  Recent Labs     02/01/23  0330   APTT 29.9     LIVER PROFILE:  No results for input(s): AST, ALT in the last 72 hours. No lab exists for component: BETHANY Calderon    Lab Results   Component Value Date/Time    ALT (SGPT) 30 01/29/2023 03:32 AM    AST (SGOT) 59 (H) 01/29/2023 03:32 AM    Alk. phosphatase 84 01/29/2023 03:32 AM    Bilirubin, direct 3.9 (H) 01/29/2023 03:32 AM    Bilirubin, total 6.0 (H) 01/29/2023 03:32 AM       Imaging Last 24 Hours:  No results found. Assessment//Plan   Active Problems:    Symptomatic anemia (1/22/2023)    Assessment:   (1/31/2023: Reconsult for consideration of capsule study due to ongoing anemia requiring transfusion. We were consulted recently and there was discussion regarding repeating EGD and doing colonoscopy but it was determined that her acute condition, comorbidities, and debility made the risks too great unless she were to start bleeding acutely. Her stool was heme negative at the time but was heme+ last week. Hgb down to 6.5 yesterday, 7.5 today. Has required transfusion twice in the last 2 days. She had an EGD 6/2022 with gastric and duodenal erosions. She has never had a colonoscopy. She is not on blood thinners. She denies abdominal pain although her abdomen was tender on exam, nausea, or vomiting. She has a good appetite. No overt signs of GIB per nurse report. 2/3/2023: S/P EGD 2/1/2023 with gastritis and spoke like radial erythema in antrum, biopsies pending. Hgb stable, 7.3. No transfusion since 1/31/2023. No abdominal pain, nausea, or vomiting. Brown stool yesterday. ). Plan:    (- Continue PPI  - Follow H&H, transfuse prn      No plans for additional endoscopic exams at this time. Will see as needed over the weekend. Please call if needed. ).      Electronically signed by Katy Estes NP on 2/3/2023 at 1:50 PM    I

## 2023-02-03 NOTE — PROGRESS NOTES
Problem: Pressure Injury - Risk of  Goal: *Prevention of pressure injury  Description: Document Gaston Scale and appropriate interventions in the flowsheet. Outcome: Progressing Towards Goal  Note: Pressure Injury Interventions:  Sensory Interventions: Assess changes in LOC    Moisture Interventions: Absorbent underpads, Check for incontinence Q2 hours and as needed, Internal/External urinary devices, Minimize layers, Moisture barrier    Activity Interventions: Assess need for specialty bed, Increase time out of bed, Pressure redistribution bed/mattress(bed type)    Mobility Interventions: Assess need for specialty bed, Float heels, HOB 30 degrees or less, PT/OT evaluation    Nutrition Interventions: Document food/fluid/supplement intake    Friction and Shear Interventions: Apply protective barrier, creams and emollients, HOB 30 degrees or less, Minimize layers                Problem: Falls - Risk of  Goal: *Absence of Falls  Description: Document Anna Fall Risk and appropriate interventions in the flowsheet.   Outcome: Progressing Towards Goal  Note: Fall Risk Interventions:  Mobility Interventions: OT consult for ADLs, PT Consult for mobility concerns, PT Consult for assist device competence         Medication Interventions: Bed/chair exit alarm, Patient to call before getting OOB    Elimination Interventions: Call light in reach

## 2023-02-03 NOTE — PROGRESS NOTES
1900: Bedside and Verbal shift change report given to Zaid Alexander RN (oncoming nurse) by Pepe Hannon RN (offgoing nurse). Report included the following information SBAR, [address], and [facility]   7379: Patient requested to go back to sleep and do wound care at a later time. Weight on bed scale varied so greatly from the weight that is in the chart, it said 233 lbs. Nurse wasn't sure if it is accurate. End of Shift Note    Bedside shift change report given to Calista Starr (oncoming nurse) by Latrice Grijalva RN (offgoing nurse). Report included the following information SBAR, Kardex, and MAR    Shift worked:  [id number]     Shift summary and any significant changes:          Concerns for physician to address:       Zone phone for oncoming shift:          Activity:  Activity Level: Bed Rest  Number times ambulated in hallways past shift: 0  Number of times OOB to chair past shift: 0    Cardiac:   Cardiac Monitoring: Yes      Cardiac Rhythm: Sinus Rhythm    Access:  Current line(s): PIV     Genitourinary:   Urinary status: voiding and external catheter    Respiratory:   O2 Device: BIPAP  Chronic home O2 use?: N/A  Incentive spirometer at bedside: NO       GI:  Last Bowel Movement Date: 02/02/23 (patient stated)  Current diet:  ADULT DIET Regular; Low Fat/Low Chol/High Fiber/2 gm Na; Low Sodium (2 gm); Likes peas, carrots, mashed potatoes, chocolate ensure high protein please  Passing flatus: YES  Tolerating current diet: YES       Pain Management:   Patient states pain is manageable on current regimen: YES    Skin:  Gaston Score: 13  Interventions: limit briefs and internal/external urinary devices    Patient Safety:  Fall Score:  Total Score: 1  Interventions: bed/chair alarm       Length of Stay:  Expected LOS: 2d 16h  Actual LOS: 12      Latrice Grijalva RN

## 2023-02-03 NOTE — ACP (ADVANCE CARE PLANNING)
Advance Care Planning     Advance Care Planning (ACP) Physician/NP/PA Conversation      Date of Conversation: 2/3/23  Conducted with: Patient with Decision Making Capacity and daughter/Kaylen Perryad:     Primary Decision Maker: Raleigh Espinoza - [phone]    Secondary Decision Maker: Ronnie Faust - Sister - [phone]  Click here to complete [address] including selection of the Healthcare Decision Maker Relationship (ie \"Primary\")        Care Preferences:    Hospitalization: \"If your health worsens and it becomes clear that your chance of recovery is unlikely, what would be your preference regarding hospitalization? \"  The patient would prefer hospitalization. Ventilation: \"If you were unable to breathe on your own and your chance of recovery was unlikely, what would be your preference about the use of a ventilator (breathing machine) if it was available to you? \"   The patient would NOT desire the use of a ventilator. Resuscitation: \"In the event your heart stopped as a result of an underlying serious health condition, would you want attempts to be made to restart your heart, or would you prefer a natural death? \"   No, do NOT attempt to resuscitate.     Additional topics discussed: treatment goals, benefit/burden of treatment options, artificial nutrition, ventilation preferences, hospitalization preferences, resuscitation preferences, end of life care preferences (vegetative state/imminent death), and hospice care    Conversation Outcomes / Follow-Up Plan:   ACP complete - no further action today  Reviewed DNR/DNI and patient confirms current DNR status - completed forms on file (place new order if needed)     Length of Voluntary ACP Conversation in minutes:  45 minutes    Miugel Dias NP     Per my conversation with pt:  1) DNR  2) limited interventions: Bipap ok, do not intubate  3) feeding tube is ok with pt    \"Do not keep me on machines if no hope of meaningful recovery. \"

## 2023-02-03 NOTE — PROGRESS NOTES
Palliative Medicine Consult  Miguel A: [phone] ([phone])    Patient Name: Timob Kerns  YOB: 1960    Today's date:  2/3/23  Date of Initial Consult: 1/25/23  Reason for Consult: End Stage Disease  Requesting Provider: Farzad Smallwood MD   Primary Care Physician: Unknown, Provider     SUMMARY:   Timbo Kerns is a 60 y.o. with a past history of morbid obesity, DM2, CHF,  bed bound for 2 years, on 4 L of O2, COPD, CHF, morbid obesity, p. Afib, chronic pain, nonambulatory at baseline, JOSLYN compliatn with CPAP, h/o duodenal ulcers, who was admitted on 1/21/2023 from home with a diagnosis of symptomatic anemia. HPI:  pt was sent to ED by her PCP for low h/h. Pt reports feeling fatigued but denies CP, SOB, no other symptoms, no bleeding in stool or urine. Admission course: hgb was 5.2 on admission that improved to 7.0 following 1 unit prbcs, hypotensive. 1/22: underwent avulsion of toenail off right great toe. Current medical issues leading to Palliative Medicine involvement include: poor prognosis. 1/24: continues no bleeding and negative fecal occult blood test, ASA-4 very high risk, if she has cancer, not a candidate for aggressive measures. 1/25: RRT called this am for respiratory failure, on HFNC02 at 15 liters, due to C02>130, Pa02 70s, Sp02 91%, bipap started, transferred to ICU. Blood cultures positive for staph aureus, abx will be started today. 3 units prbcs given during this admission so far.  1/27: left femoral neck fracture, per ortho pt not a candidate for surgical interventions. 1/29: 07:31am: CODE called after transitioning from BiPAP to NC02, went into v-tach with quick, spontaneous recovery without intervention. 16:22pm:   CODE for no pulse/v-tach and unresponsive, CPR x 10 compressions upon which pt awoke and pushed RN off her. Pt changed her code status to DNR.  1/30: no further arrhythmias. Still weaning levophed. Receiving PRBCs. CT of pelvis:  1.   No CT evidence of acute osteomyelitis. 2.  Chronic nonunited left femoral neck fracture. Adjacent moderate hip joint osteoarthritis with large complex joint effusion. 3.  Severe right hip joint osteophytosis with small complex joint effusion. 4.  Small volume ascites. Anasarca. 1/31: prbcs for hgb 6.5.  developed rash overnight. 2/1 :  EGD  revealed  gastritis and spoke like radial erythema in antrum, biopsies pending. Hgb stable, 7.3. last transfusion  1/31/2023. Unsuccessful attempt to aspirate fluid from hip. 2/3:  lying in bed, no complaints    Needs YANNA but high risk as she would need to be intubated, ongoing GIB not a candidate for invasive procedures (? capsule study) has required 2 transfusions of prbcs over the past 24 hours,  hip fracture (chronic), pending aspiration of left hip effusion. Psychosocial: bed bound for 3 years. Home 02 3-4L. Lives with dtr and requires assists with ADLs/IADLs   PALLIATIVE DIAGNOSES:   Acute on chronic Anemia, suspect UGIB (stool neg for occult blood), history of PUD on EGD June 2022,  occult positive  Hypotension due to MSSA bacteremia   Chronic hypoxic respiratory failure, COPD, JOSLYN on CPAP  Paroxysmal afib  Severe Diastolic HF  Edema   Wounds  Right great toenail falling off due to a cat scratch   Left hip pain: XR left hip: 1. Displaced, rotated, left femoral neck fracture. 2. Osteoporosis, new from 2014. 3. End-stage right hip osteoarthritis, new from 2014. .  V-fib in setting of profound hypokalemia  Care decisions     PLAN:   Prior to visit, I completed a review of patient's medical records, including medical documentation, vital signs, MARs, and results of various labs and other diagnostics. I also spoke with patient's nurse Robel Vogel Met with pt and dtr [facility]: we completed a new POST form given there are some changes (see ACP note for details).   Spent a fair amount of time discussing Hospice benefits, what they can and cannot provide, discussed the burden of having to come to infusion center for blood transfusions which she might need indefinitely. Pt states she still has QOL, is not ready to die. I suspect the effort of transporting pt to and from infusion center from home is going to play role in her decisions moving forward once she experiences the burden of it. Counseled pt that she can ask for Hospice anytime she feels ready for it. She gets house calls from [facility], Wilton Armijo. I called his office and left a VM asking him to return my call to discuss possibility of hospice in the future. Initial consult note routed to primary continuity provider and/or primary health care team members  Communicated plan of care with: Palliative IDTMaribel 192 Team     GOALS OF CARE / TREATMENT PREFERENCES:     GOALS OF CARE:  Patient/Health Care Proxy Stated Goals: Prolong life    TREATMENT PREFERENCES:   Code Status: DNR      Advance Care Planning:  [x] [facility] Interdisciplinary Team has updated the ACP Navigator with Health Care Decision Maker and Patient Capacity      Primary Decision Maker: Yanique Bonilla Child - [phone]    Secondary Decision Maker: True Duet - Sister - [phone]  Advance Care Planning 1/25/2023   Patient's Healthcare Decision Maker is: Named in scanned ACP document   Confirm Advance Directive Yes, on file   Does the patient have other document types MOST/MOLST/POST/POLST       Medical Interventions: Full interventions       Other:    As far as possible, the palliative care team has discussed with patient / health care proxy about goals of care / treatment preferences for patient.      HISTORY:     History obtained from: chart     The patient is:   [] Verbal and participatory  [x] Non-participatory due to:   pt's condition     Clinical Pain Assessment (nonverbal scale for severity on nonverbal patients):   Clinical Pain Assessment  Severity: 0     Activity (Movement): Lying quietly, normal position    Duration: for how long has pt been experiencing pain (e.g., 2 days, 1 month, years)  Frequency: how often pain is an issue (e.g., several times per day, once every few days, constant)     FUNCTIONAL ASSESSMENT:     Palliative Performance Scale (PPS):  PPS: 20       PSYCHOSOCIAL/SPIRITUAL SCREENING:     Palliative IDT has assessed this patient for cultural preferences / practices and a referral made as appropriate to needs (Cultural Services, Patient Advocacy, Ethics, etc.)    Any spiritual / Muslim concerns:  [] Yes /  [x] No   If \"Yes\" to discuss with pastoral care during IDT     Does caregiver feel burdened by caring for their loved one:   [] Yes /  [x] No /  [] No Caregiver Present    If \"Yes\" to discuss with social work during IDT    Anticipatory grief assessment:   [x] Normal  / [] Maladaptive     If \"Maladaptive\" to discuss with social work during IDT    ESAS Anxiety: Anxiety: 0    ESAS Depression: Depression: 0         REVIEW OF SYSTEMS:     Positive and pertinent negative findings in ROS are noted above in HPI. The following systems were [x] reviewed / [] unable to be reviewed as noted in HPI  Other findings are noted below. Systems: constitutional, ears/nose/mouth/throat, respiratory, gastrointestinal, genitourinary, musculoskeletal, integumentary, neurologic, psychiatric, endocrine. Positive findings noted below. Modified ESAS Completed by: provider   Fatigue: 5 Drowsiness: 0   Depression: 0 Pain: 0   Anxiety: 0 Nausea: 0   Anorexia: 0 Dyspnea: 0           Stool Occurrence(s): 1        PHYSICAL EXAM:     From RN flowsheet:  Wt Readings from Last 3 Encounters:   01/30/23 291 lb 0.1 oz (132 kg)   06/15/22 301 lb 9.6 oz (136.8 kg)   05/16/22 298 lb 11.6 oz (135.5 kg)     Blood pressure (!) 136/57, pulse 89, temperature 98.2 °F (36.8 °C), resp. rate 23, height 5' 3\" (1.6 m), weight 291 lb 0.1 oz (132 kg), SpO2 100 %, not currently breastfeeding.     Pain Scale 1: Numeric (0 - 10)  Pain Intensity 1: 0  Pain Onset 1: chronis  Pain Location 1: Leg  Pain Orientation 1: Left, Right  Pain Description 1: Aching  Pain Intervention(s) 1: Medication (see MAR)  Last bowel movement, if known:     Constitutional: aao x3, NAD  Eyes: pupils equal, anicteric  ENMT: no nasal discharge, dry mucous membranes   Cardiovascular: regular rhythm, distal pulses intact  Respiratory: breathing not labored, symmetric  Gastrointestinal: soft non-tender, +bowel sounds  Musculoskeletal: no deformity, no tenderness to palpation  Skin: warm, dry, dressing around right forefoot  Neurologic: following  commands, moving all extremities  Psychiatric: full affect       HISTORY:     Active Problems:    Symptomatic anemia (1/22/2023)    Past Medical History:   Diagnosis Date    Acute on chronic respiratory failure with hypoxia and hypercapnia ([facility]) 11/20/2020    Atrial fibrillation ([address] 11/4/2020    CHF (congestive heart failure) ([facility])     Diabetes ([facility])     Hypertension     Ill-defined condition     high cholesterol    Ill-defined condition     tachycardia    JOSLYN (obstructive sleep apnea) 11/20/2020    Other and unspecified symptoms and signs involving general sensations and perceptions     ruptured disc    S/P cardiac cath 11/4/2020    11/3/2020 nonobstructive disease      Past Surgical History:   Procedure Laterality Date    VT COMPRE ELECTROPHYSIOL XM W/LEFT ATRIAL PACNG/REC N/A 11/23/2020    Lt Atrial Pace & Record During Ep Study performed by Isabel Houston MD at [facility] CARDIAC CATH LAB    VT COMPRE EP EVAL ABLTJ 3D MAPG TX SVT N/A 11/23/2020    ABLATION A-FLUTTER performed by Isabel Houston MD at [facility] CARDIAC CATH LAB    VT ICAR CATHETER ABLATION ARRHYTHMIA ADD ON N/A 11/23/2020    Ablation Svt/Vt Add On performed by Isabel Houston MD at [facility] CARDIAC CATH LAB    VT INTRACARDIAC ELECTROPHYSIOLOGIC 3D MAPPING N/A 11/23/2020    Ep 3d Mapping performed by Isabel Houston MD at [facility] CARDIAC CATH LAB    VT PROGRAMMED STIMJ & PACG AFTER IV DRUG NFS N/A 11/23/2020    Drug Stimulation performed by Roque Lafleur MD at [facility] CARDIAC CATH LAB      History reviewed. No pertinent family history. History reviewed, no pertinent family history. Social History     Tobacco Use    Smoking status: Former    Smokeless tobacco: Never   Substance Use Topics    Alcohol use: No     Allergies   Allergen Reactions    Cefepime Rash     tolerated nafcillin 1/2023    Nafcillin Rash    Cefazolin Rash    Other Plant, Animal, Environmental Rash     Ivory soap caused rash on hands.     Zosyn [Piperacillin-Tazobactam] Rash     Possible rash to zosyn, also received IV contrast and blood  Tolerated nafcillin before/after 1/2023      Current Facility-Administered Medications   Medication Dose Route Frequency    VANCOMYCIN INFORMATION NOTE   Other Rx Dosing/Monitoring    sucralfate (CARAFATE) tablet 1 g  1 g Oral AC&HS    camphor-menthoL (SARNA) 0.5-0.5 % lotion   Topical Q12H    miconazole (MICONTIN) 2 % ointment   Topical BID    albuterol-ipratropium (DUO-NEB) 2.5 MG-0.5 MG/3 ML  3 mL Nebulization Q6H RT    L.acidophilus-paracasei-S.thermophil-bifidobacter (RISAQUAD) 8 billion cell capsule  1 Capsule Oral DAILY    oxyCODONE-acetaminophen (PERCOCET) 5-325 mg per tablet 1 Tablet  1 Tablet Oral Q4H PRN    0.9% sodium chloride infusion 250 mL  250 mL IntraVENous PRN    potassium bicarb-citric acid (EFFER-K) tablet 20 mEq  20 mEq Oral DAILY    folic acid (FOLVITE) 1 mg in 0.9% sodium chloride 50 mL ivpb   IntraVENous DAILY    furosemide (LASIX) tablet 40 mg  40 mg Oral DAILY    0.9% sodium chloride infusion 250 mL  250 mL IntraVENous PRN    midodrine (PROAMATINE) tablet 10 mg  10 mg Oral TID WITH MEALS    [Held by provider] enoxaparin (LOVENOX) injection 30 mg  30 mg SubCUTAneous Q12H    alcohol 62% (NOZIN) nasal  1 Ampule  1 Ampule Topical Q12H    insulin lispro (HUMALOG) injection   SubCUTAneous Q6H    glucose chewable tablet 16 g  4 Tablet Oral PRN    glucagon (GLUCAGEN) injection 1 mg  1 mg IntraMUSCular PRN    [Held by provider] metoprolol tartrate (LOPRESSOR) tablet 12.5 mg  12.5 mg Oral BID    atorvastatin (LIPITOR) tablet 10 mg  10 mg Oral QHS    pantoprazole (PROTONIX) tablet 40 mg  40 mg Oral ACB&D    sodium chloride (NS) flush 5-40 mL  5-40 mL IntraVENous Q8H    sodium chloride (NS) flush 5-40 mL  5-40 mL IntraVENous PRN    acetaminophen (TYLENOL) tablet 650 mg  650 mg Oral Q6H PRN    Or    acetaminophen (TYLENOL) suppository 650 mg  650 mg Rectal Q6H PRN    polyethylene glycol (MIRALAX) packet 17 g  17 g Oral DAILY PRN    ondansetron (ZOFRAN ODT) tablet 4 mg  4 mg Oral Q8H PRN    Or    ondansetron (ZOFRAN) injection 4 mg  4 mg IntraVENous Q6H PRN    dextrose 10% infusion 0-250 mL  0-250 mL IntraVENous PRN    balsam peru-castor oiL (VENELEX) ointment   Topical BID          LAB AND IMAGING FINDINGS:     Lab Results   Component Value Date/Time    WBC 12.5 (H) 02/03/2023 01:13 AM    HGB 7.3 (L) 02/03/2023 01:13 AM    PLATELET 198 84/63/9502 01:13 AM     Lab Results   Component Value Date/Time    Sodium 138 02/03/2023 01:13 AM    Potassium 3.5 02/03/2023 01:13 AM    Chloride 98 02/03/2023 01:13 AM    CO2 36 (H) 02/03/2023 01:13 AM    BUN 7 02/03/2023 01:13 AM    Creatinine 0.80 02/03/2023 01:13 AM    Calcium 8.2 (L) 02/03/2023 01:13 AM    Magnesium 2.3 02/01/2023 05:24 AM    Phosphorus 2.1 (L) 02/01/2023 05:24 AM      Lab Results   Component Value Date/Time    Alk.  phosphatase 84 01/29/2023 03:32 AM    Protein, total 6.5 01/29/2023 03:32 AM    Albumin 2.1 (L) 01/29/2023 03:32 AM    Globulin 4.4 (H) 01/29/2023 03:32 AM     Lab Results   Component Value Date/Time    INR 1.1 02/01/2023 03:30 AM    Prothrombin time 11.9 (H) 02/01/2023 03:30 AM    aPTT 29.9 02/01/2023 03:30 AM      Lab Results   Component Value Date/Time    Iron 31 (L) 01/23/2023 02:09 PM    TIBC 328 01/23/2023 02:09 PM    Iron % saturation 9 (L) 01/23/2023 02:09 PM    Ferritin 32 01/23/2023 02:09 PM      Lab Results   Component Value Date/Time    pH 7.27 (L) 01/25/2023 12:07 PM    PCO2 104 (H) 01/25/2023 12:07 PM    PO2 68 (L) 01/25/2023 12:07 PM     No components found for: [place]   Lab Results   Component Value Date/Time    CK 8 (L) 06/17/2022 05:28 AM    CK - MB <1.0 11/01/2020 04:33 AM                Total time: 55  Counseling / coordination time, spent as noted above: 45  > 50% counseling / coordination?: y    Prolonged service was provided for  []30 min   []75 min in face to face time in the presence of the patient, spent as noted above. Time Start:   Time End:   Note: this can only be billed with 22828 (initial) or 85017 (follow up). If multiple start / stop times, list each separately.

## 2023-02-03 NOTE — PROGRESS NOTES
Patient transferred to room 2307. Handoff given to formerly [facility] to follow for dcp. Amira Thao RN BSN CRM        [phone]

## 2023-02-03 NOTE — PROGRESS NOTES
Hospitalist Progress Note    NAME: Merlyn Yeh   :  1960   MRN:  [id number]       Assessment / Plan:    Acute on chronic hypoxic respiratory failure POA  COPD-- end stage/Poor prognosis,   JOSLYN compliant with CPAP  Diastolic heart failure     Patient is currently on 4 L supplemental nasal cannula oxygen. Patient is requiring BiPAP only at nighttime. Patient is back to her baseline oxygen requirements. Septic shock  MSSA bacteremia  Left femoral neck fracture     Off pressors now  Ortho to review CT pelvis-? Aspiration of fluid in left hip -failed attempt by IR ()  S/p Zosyn, was on Nafcillin- changed to Vancomycin per ID   Last Repeat blood cultures () neg x 2 days for now  ID following. Recommended to continue vancomycin in the hospital.  Discharge on daptomycin. Patient will need PICC line placement if she doesn't opt for hospice     Acute on chronic anemia:  GI following, will be high risk for Endoscopy, colonscopy  Occult stool negative. No signs of active bleeding  GI performed EGD. High risk for colonoscopy. Signed off. Hematology following   Has folate and iron deficiency, started on iv folic acid and iv iron last week  S/p transfusions  S/p EGD  by GI- found duodenitis/gastritis - recommends to cont PPI BID + carafate     Erythematous rash- persistent  Has this rash on b/l uper and lower ext, chest as well  ? D/t medication, zosyn switched to Nafcillin switched to Vancomycin      Type II DM:  Continue lispro sliding scale     Paroxysmal afib  CAD:  Continue statin, Metoprolol  Aspirin discontinued given duodenitis and gastritis     Severe obesity  BMI 51     Code status: DNR  Prophylaxis: lovenox  Recommended Disposition: Home w/Family. Await conclusion after palliative meeting. If patient does not opt for hospice, she will need PICC line placement and prolonged antibiotic therapy. Subjective:   Patient comfortably lying in the bed.   Patient is requiring 4 L supplemental nasal cannula oxygen which is her baseline. Patient is using BiPAP at nighttime. Patient states that she feeling better now. Await conclusion after meeting with palliative care/hospice. If patient does not decide to go with hospice, patient will need prolonged antibiotic therapy and PICC line placement. ID recommends continuing vancomycin while in the hospital and to be discharged on daptomycin if she does not opt for home hospice. Objective:     VITALS:   Last 24hrs VS reviewed since prior progress note. Most recent are:  Patient Vitals for the past 24 hrs:   Temp Pulse Resp BP SpO2   02/03/23 1338 -- -- -- -- 100 %   02/03/23 1129 -- 89 -- (!) 140/48 --   02/03/23 1102 98.4 °F (36.9 °C) 84 18 (!) 140/48 100 %   02/03/23 0913 -- 89 -- (!) 136/57 --   02/03/23 0745 98.2 °F (36.8 °C) 85 23 (!) 120/45 100 %   02/03/23 0730 -- -- -- -- 97 %   02/03/23 0516 -- -- -- -- 92 %   02/03/23 0309 98.3 °F (36.8 °C) 79 23 (!) 108/47 93 %   02/03/23 0203 -- -- -- -- 93 %   02/03/23 0155 -- -- -- -- 92 %   02/02/23 2211 98.2 °F (36.8 °C) 91 27 (!) 124/49 91 %   02/02/23 1933 -- -- -- -- 95 %   02/02/23 1911 98.6 °F (37 °C) 86 26 (!) 141/50 95 %   02/02/23 1851 99.1 °F (37.3 °C) 92 26 (!) 129/51 91 %       Intake/Output Summary (Last 24 hours) at 2/3/2023 1600  Last data filed at 2/3/2023 1221  Gross per 24 hour   Intake 50 ml   Output 1600 ml   Net -1550 ml        I had a face to face encounter and independently examined this patient on 2/3/2023, as outlined below:  PHYSICAL EXAM:  General: Alert, cooperative, no acute distress    EENT:  EOMI. Anicteric sclerae. Resp:  CTA bilaterally, no wheezing or rales. No accessory muscle use  CV:  Regular  rhythm,  No edema  GI:  Soft, Non distended, Non tender. Neurologic:  Alert and oriented X 3, normal speech  Psych:   Good insight. Not anxious nor agitated  Skin:  No rashes.   No jaundice    Reviewed most current lab test results and cultures  YES  Reviewed most current radiology test results   YES  Review and summation of old records today    NO  Reviewed patient's current orders and MAR    YES  PMH/SH reviewed - no change compared to H&P  ________________________________________________________________________  Care Plan discussed with:    Comments   Patient Y    Family      RN Y    Care Manager     Consultant  Y                     Y Multidiciplinary team rounds were held today with , nursing, pharmacist and clinical coordinator. Patient's plan of care was discussed; medications were reviewed and discharge planning was addressed. ________________________________________________________________________  Total NON critical care TIME:  54   Minutes  ________________________________________________________________________  Tessa Seo MD     Procedures: see electronic medical records for all procedures/Xrays and details which were not copied into this note but were reviewed prior to creation of Plan. LABS:  I reviewed today's most current labs and imaging studies.   Pertinent labs include:  Recent Labs     02/03/23  0113 02/02/23  0939 02/01/23  0330   WBC 12.5* 13.4* 11.2*   HGB 7.3* 7.8* 8.3*   HCT 25.1* 26.1* 27.8*    141* 115*     Recent Labs     02/03/23  0113 02/02/23  0939 02/01/23  0524 02/01/23  0330    140 138  --    K 3.5 3.6 3.6  --    CL 98 98 98  --    CO2 36* 35* 34*  --    * 91 83  --    BUN 7 9 12  --    CREA 0.80 0.87 0.78  --    CA 8.2* 8.2* 8.0*  --    MG  --   --  2.3  --    PHOS  --   --  2.1*  --    INR  --   --   --  1.1       Signed: Tessa Seo MD

## 2023-02-03 NOTE — PROGRESS NOTES
0700 Bedside shift change report given to Laura (oncoming nurse) by Delmis Soto RN (offgoing nurse). Report included the following information SBAR, Kardex, MAR, Recent Results, and Cardiac Rhythm NSR .     1900 End of Shift Note    Bedside shift change report given to Carl Theodora (oncoming nurse) by Sam Grijalva RN (offgoing nurse). Report included the following information SBAR, Kardex, Intake/Output, MAR, Recent Results, and Cardiac Rhythm NSR    Shift worked:  7a-7p     Shift summary and any significant changes:     Oxygen weaned to 4L (baseline)     Concerns for physician to address:  none     Zone phone for oncoming shift:          Activity:  Activity Level: Bed Rest  Number times ambulated in hallways past shift: 0  Number of times OOB to chair past shift: 0    Cardiac:   Cardiac Monitoring: Yes      Cardiac Rhythm: Sinus Rhythm    Access:  Current line(s): PIV     Genitourinary:   Urinary status: voiding and external catheter    Respiratory:   O2 Device: BIPAP  Chronic home O2 use?: YES  Incentive spirometer at bedside: NO       GI:  Last Bowel Movement Date: 02/02/23 (patient stated)  Current diet:  ADULT DIET Regular; Low Sodium (2 gm); Mashed potatoes with every lunch and dinner  ADULT ORAL NUTRITION SUPPLEMENT Breakfast; Standard 4 oz  ADULT ORAL NUTRITION SUPPLEMENT Lunch; Fortified Pudding  ADULT ORAL NUTRITION SUPPLEMENT Dinner; Frozen Supplement  DIET ONE TIME MESSAGE  Passing flatus: YES  Tolerating current diet: YES       Pain Management:   Patient states pain is manageable on current regimen: YES    Skin:  Gaston Score: 13  Interventions: float heels, increase time out of bed, internal/external urinary devices, and nutritional support     Patient Safety:  Fall Score:  Total Score: 1  Interventions: bed/chair alarm and gripper socks       Length of Stay:  Expected LOS: 2d 16h  Actual LOS: 12      Sam Grijalva RN

## 2023-02-04 ENCOUNTER — APPOINTMENT (OUTPATIENT)
Dept: GENERAL RADIOLOGY | Age: 63
End: 2023-02-04
Attending: STUDENT IN AN ORGANIZED HEALTH CARE EDUCATION/TRAINING PROGRAM
Payer: MEDICARE

## 2023-02-04 LAB
ANION GAP SERPL CALC-SCNC: 1 MMOL/L (ref 5–15)
BACTERIA SPEC CULT: NORMAL
BUN SERPL-MCNC: 7 MG/DL (ref 6–20)
BUN/CREAT SERPL: 8 (ref 12–20)
CALCIUM SERPL-MCNC: 8.3 MG/DL (ref 8.5–10.1)
CHLORIDE SERPL-SCNC: 97 MMOL/L (ref 97–108)
CO2 SERPL-SCNC: 37 MMOL/L (ref 21–32)
CREAT SERPL-MCNC: 0.89 MG/DL (ref 0.55–1.02)
ERYTHROCYTE [DISTWIDTH] IN BLOOD BY AUTOMATED COUNT: 23.5 % (ref 11.5–14.5)
GLUCOSE BLD STRIP.AUTO-MCNC: 118 MG/DL (ref 65–117)
GLUCOSE BLD STRIP.AUTO-MCNC: 119 MG/DL (ref 65–117)
GLUCOSE BLD STRIP.AUTO-MCNC: 124 MG/DL (ref 65–117)
GLUCOSE BLD STRIP.AUTO-MCNC: 124 MG/DL (ref 65–117)
GLUCOSE SERPL-MCNC: 123 MG/DL (ref 65–100)
HCT VFR BLD AUTO: 26.6 % (ref 35–47)
HGB BLD-MCNC: 7.7 G/DL (ref 11.5–16)
MCH RBC QN AUTO: 29.4 PG (ref 26–34)
MCHC RBC AUTO-ENTMCNC: 28.9 G/DL (ref 30–36.5)
MCV RBC AUTO: 101.5 FL (ref 80–99)
NRBC # BLD: 0.38 K/UL (ref 0–0.01)
NRBC BLD-RTO: 2.4 PER 100 WBC
PLATELET # BLD AUTO: 174 K/UL (ref 150–400)
PMV BLD AUTO: 11.9 FL (ref 8.9–12.9)
POTASSIUM SERPL-SCNC: 3.8 MMOL/L (ref 3.5–5.1)
RBC # BLD AUTO: 2.62 M/UL (ref 3.8–5.2)
SERVICE CMNT-IMP: ABNORMAL
SERVICE CMNT-IMP: NORMAL
SODIUM SERPL-SCNC: 135 MMOL/L (ref 136–145)
WBC # BLD AUTO: 16.1 K/UL (ref 3.6–11)

## 2023-02-04 PROCEDURE — 74011000250 HC RX REV CODE- 250: Performed by: STUDENT IN AN ORGANIZED HEALTH CARE EDUCATION/TRAINING PROGRAM

## 2023-02-04 PROCEDURE — 82962 GLUCOSE BLOOD TEST: CPT

## 2023-02-04 PROCEDURE — 76937 US GUIDE VASCULAR ACCESS: CPT

## 2023-02-04 PROCEDURE — 74011250637 HC RX REV CODE- 250/637: Performed by: STUDENT IN AN ORGANIZED HEALTH CARE EDUCATION/TRAINING PROGRAM

## 2023-02-04 PROCEDURE — 74011250637 HC RX REV CODE- 250/637: Performed by: INTERNAL MEDICINE

## 2023-02-04 PROCEDURE — C1751 CATH, INF, PER/CENT/MIDLINE: HCPCS

## 2023-02-04 PROCEDURE — 74011000250 HC RX REV CODE- 250: Performed by: INTERNAL MEDICINE

## 2023-02-04 PROCEDURE — 94660 CPAP INITIATION&MGMT: CPT

## 2023-02-04 PROCEDURE — 65270000046 HC RM TELEMETRY

## 2023-02-04 PROCEDURE — 71045 X-RAY EXAM CHEST 1 VIEW: CPT

## 2023-02-04 PROCEDURE — 94640 AIRWAY INHALATION TREATMENT: CPT

## 2023-02-04 PROCEDURE — 74011250636 HC RX REV CODE- 250/636: Performed by: STUDENT IN AN ORGANIZED HEALTH CARE EDUCATION/TRAINING PROGRAM

## 2023-02-04 PROCEDURE — 77030018786 HC NDL GD F/USND BARD -B

## 2023-02-04 PROCEDURE — 74011250636 HC RX REV CODE- 250/636: Performed by: INTERNAL MEDICINE

## 2023-02-04 PROCEDURE — 36573 INSJ PICC RS&I 5 YR+: CPT | Performed by: STUDENT IN AN ORGANIZED HEALTH CARE EDUCATION/TRAINING PROGRAM

## 2023-02-04 PROCEDURE — 02HV33Z INSERTION OF INFUSION DEVICE INTO SUPERIOR VENA CAVA, PERCUTANEOUS APPROACH: ICD-10-PCS | Performed by: STUDENT IN AN ORGANIZED HEALTH CARE EDUCATION/TRAINING PROGRAM

## 2023-02-04 PROCEDURE — 85027 COMPLETE CBC AUTOMATED: CPT

## 2023-02-04 PROCEDURE — 74011000258 HC RX REV CODE- 258: Performed by: INTERNAL MEDICINE

## 2023-02-04 PROCEDURE — 36415 COLL VENOUS BLD VENIPUNCTURE: CPT

## 2023-02-04 PROCEDURE — 74011000250 HC RX REV CODE- 250: Performed by: PHYSICIAN ASSISTANT

## 2023-02-04 PROCEDURE — 80048 BASIC METABOLIC PNL TOTAL CA: CPT

## 2023-02-04 PROCEDURE — 74011000258 HC RX REV CODE- 258: Performed by: STUDENT IN AN ORGANIZED HEALTH CARE EDUCATION/TRAINING PROGRAM

## 2023-02-04 RX ADMIN — Medication 1 CAPSULE: at 08:38

## 2023-02-04 RX ADMIN — ENOXAPARIN SODIUM 30 MG: 100 INJECTION SUBCUTANEOUS at 22:09

## 2023-02-04 RX ADMIN — MIDODRINE HYDROCHLORIDE 10 MG: 5 TABLET ORAL at 08:38

## 2023-02-04 RX ADMIN — CASTOR OIL AND BALSAM, PERU: 788; 87 OINTMENT TOPICAL at 21:41

## 2023-02-04 RX ADMIN — Medication: at 17:20

## 2023-02-04 RX ADMIN — ATORVASTATIN CALCIUM 10 MG: 10 TABLET, FILM COATED ORAL at 21:40

## 2023-02-04 RX ADMIN — METOPROLOL TARTRATE 12.5 MG: 25 TABLET, FILM COATED ORAL at 17:14

## 2023-02-04 RX ADMIN — SUCRALFATE 1 G: 1 TABLET ORAL at 11:35

## 2023-02-04 RX ADMIN — Medication: at 08:45

## 2023-02-04 RX ADMIN — SUCRALFATE 1 G: 1 TABLET ORAL at 17:14

## 2023-02-04 RX ADMIN — PANTOPRAZOLE SODIUM 40 MG: 40 TABLET, DELAYED RELEASE ORAL at 17:14

## 2023-02-04 RX ADMIN — PANTOPRAZOLE SODIUM 40 MG: 40 TABLET, DELAYED RELEASE ORAL at 08:39

## 2023-02-04 RX ADMIN — FOLIC ACID: 5 INJECTION, SOLUTION INTRAMUSCULAR; INTRAVENOUS; SUBCUTANEOUS at 10:13

## 2023-02-04 RX ADMIN — SUCRALFATE 1 G: 1 TABLET ORAL at 08:38

## 2023-02-04 RX ADMIN — IPRATROPIUM BROMIDE AND ALBUTEROL SULFATE 3 ML: 2.5; .5 SOLUTION RESPIRATORY (INHALATION) at 15:18

## 2023-02-04 RX ADMIN — SODIUM CHLORIDE, PRESERVATIVE FREE 10 ML: 5 INJECTION INTRAVENOUS at 17:18

## 2023-02-04 RX ADMIN — Medication 1 AMPULE: at 22:13

## 2023-02-04 RX ADMIN — IPRATROPIUM BROMIDE AND ALBUTEROL SULFATE 3 ML: 2.5; .5 SOLUTION RESPIRATORY (INHALATION) at 02:23

## 2023-02-04 RX ADMIN — ENOXAPARIN SODIUM 30 MG: 100 INJECTION SUBCUTANEOUS at 11:35

## 2023-02-04 RX ADMIN — CAMPHOR, MENTHOL: .5; .5 LOTION TOPICAL at 08:43

## 2023-02-04 RX ADMIN — SODIUM CHLORIDE, PRESERVATIVE FREE 10 ML: 5 INJECTION INTRAVENOUS at 06:11

## 2023-02-04 RX ADMIN — VANCOMYCIN HYDROCHLORIDE 500 MG: 500 INJECTION, POWDER, LYOPHILIZED, FOR SOLUTION INTRAVENOUS at 10:14

## 2023-02-04 RX ADMIN — MIDODRINE HYDROCHLORIDE 10 MG: 5 TABLET ORAL at 17:14

## 2023-02-04 RX ADMIN — POTASSIUM BICARBONATE 20 MEQ: 391 TABLET, EFFERVESCENT ORAL at 08:38

## 2023-02-04 RX ADMIN — CASTOR OIL AND BALSAM, PERU: 788; 87 OINTMENT TOPICAL at 08:44

## 2023-02-04 RX ADMIN — Medication 1 AMPULE: at 01:24

## 2023-02-04 RX ADMIN — SODIUM CHLORIDE, PRESERVATIVE FREE 10 ML: 5 INJECTION INTRAVENOUS at 21:41

## 2023-02-04 RX ADMIN — VANCOMYCIN HYDROCHLORIDE 500 MG: 500 INJECTION, POWDER, LYOPHILIZED, FOR SOLUTION INTRAVENOUS at 21:58

## 2023-02-04 RX ADMIN — Medication 1 AMPULE: at 08:42

## 2023-02-04 RX ADMIN — CAMPHOR, MENTHOL: .5; .5 LOTION TOPICAL at 21:40

## 2023-02-04 RX ADMIN — METOPROLOL TARTRATE 12.5 MG: 25 TABLET, FILM COATED ORAL at 08:38

## 2023-02-04 RX ADMIN — FUROSEMIDE 40 MG: 40 TABLET ORAL at 08:39

## 2023-02-04 RX ADMIN — MIDODRINE HYDROCHLORIDE 10 MG: 5 TABLET ORAL at 11:35

## 2023-02-04 RX ADMIN — IPRATROPIUM BROMIDE AND ALBUTEROL SULFATE 3 ML: 2.5; .5 SOLUTION RESPIRATORY (INHALATION) at 07:05

## 2023-02-04 RX ADMIN — SUCRALFATE 1 G: 1 TABLET ORAL at 21:40

## 2023-02-04 RX ADMIN — IPRATROPIUM BROMIDE AND ALBUTEROL SULFATE 3 ML: 2.5; .5 SOLUTION RESPIRATORY (INHALATION) at 19:31

## 2023-02-04 NOTE — PROGRESS NOTES
Hospitalist Progress Note    NAME: Melissa Coleman   :  1960   MRN:  [id number]       Assessment / Plan:    Acute on chronic hypoxic respiratory failure POA  COPD-- end stage/Poor prognosis,   JOSLYN compliant with CPAP  Diastolic heart failure     Patient is currently on 4 L supplemental nasal cannula oxygen. Patient is requiring BiPAP only at nighttime. Patient is back to her baseline oxygen requirements. Septic shock  MSSA bacteremia  Left femoral neck fracture     Off pressors now  Ortho to review CT pelvis-? Aspiration of fluid in left hip -failed attempt by IR ()  S/p Zosyn, was on Nafcillin- changed to Vancomycin per ID   Last Repeat blood cultures () neg x 2 days for now  ID following. Recommended to continue vancomycin in the hospital.  Discharge on daptomycin. Patient did not want hospice. PICC team consulted. Acute on chronic anemia:  GI following, will be high risk for Endoscopy, colonscopy  Occult stool negative. No signs of active bleeding  GI performed EGD. High risk for colonoscopy. Signed off. Hematology following   Has folate and iron deficiency, started on iv folic acid and iv iron last week  S/p transfusions  S/p EGD  by GI- found duodenitis/gastritis - recommends to cont PPI BID + carafate     Erythematous rash- persistent  Has this rash on b/l uper and lower ext, chest as well  ? D/t medication, zosyn switched to Nafcillin switched to Vancomycin      Type II DM:  Continue lispro sliding scale     Paroxysmal afib  CAD:  Continue statin, Metoprolol  Aspirin discontinued given duodenitis and gastritis     Severe obesity  BMI 51  Complicates overall care     Code status: DNR  Prophylaxis: lovenox  Recommended Disposition: Home w/Family. Patient did not want hospice. PICC team consulted. Subjective:   Patient comfortably lying in the bed. Patient is requiring 4 L supplemental nasal cannula oxygen which is her baseline.   Patient is using BiPAP at nighttime. Patient states that she feeling better now. Patient did not want hospice. PICC team consulted. ID recommends continuing vancomycin while in the hospital and to be discharged on daptomycin if she does not opt for home hospice. Objective:     VITALS:   Last 24hrs VS reviewed since prior progress note. Most recent are:  Patient Vitals for the past 24 hrs:   Temp Pulse Resp BP SpO2   02/04/23 1153 -- 74 19 (!) 108/51 96 %   02/04/23 1149 -- -- -- -- 97 %   02/04/23 1135 97.8 °F (36.6 °C) 75 20 (!) 110/52 96 %   02/04/23 0838 -- 77 -- (!) 104/41 --   02/04/23 0739 -- -- -- -- 94 %   02/04/23 0718 98 °F (36.7 °C) 82 27 (!) 126/47 95 %   02/04/23 0330 98 °F (36.7 °C) 82 24 (!) 128/59 95 %   02/04/23 0223 -- -- -- -- (!) 89 %   02/04/23 0020 -- 82 22 (!) 129/54 (!) 89 %   02/04/23 0000 97.8 °F (36.6 °C) -- -- -- --   02/03/23 2257 -- -- -- -- 92 %   02/03/23 2200 -- 82 24 (!) 118/41 92 %   02/03/23 2000 97.7 °F (36.5 °C) 85 29 (!) 118/41 91 %   02/03/23 1921 -- -- -- -- 94 %   02/03/23 1751 -- 83 -- (!) 125/44 --   02/03/23 1426 98.2 °F (36.8 °C) 93 24 (!) 134/44 97 %   02/03/23 1338 -- -- -- -- 100 %       Intake/Output Summary (Last 24 hours) at 2/4/2023 1311  Last data filed at 2/3/2023 2000  Gross per 24 hour   Intake --   Output 875 ml   Net -875 ml        I had a face to face encounter and independently examined this patient on 2/4/2023, as outlined below:  PHYSICAL EXAM:  General: Alert, cooperative, no acute distress    EENT:  EOMI. Anicteric sclerae. Resp:  CTA bilaterally, no wheezing or rales. No accessory muscle use  CV:  Regular  rhythm,  No edema  GI:  Soft, Non distended, Non tender. Neurologic:  Alert and oriented X 3, normal speech  Psych:   Good insight. Not anxious nor agitated  Skin:  Erythematous rash all over the body.   No jaundice    Reviewed most current lab test results and cultures  YES  Reviewed most current radiology test results   YES  Review and summation of old records today NO  Reviewed patient's current orders and MAR    YES  PMH/SH reviewed - no change compared to H&P  ________________________________________________________________________  Care Plan discussed with:    Comments   Patient [address]     Consultant                        Multidiciplinary team rounds were held today with , nursing, pharmacist and clinical coordinator. Patient's plan of care was discussed; medications were reviewed and discharge planning was addressed. I spent a total of 35 minutes on the day of the visit. This includes face to face time and non-face to face time preparing to see the patient such as review of tests, obtaining and/or reviewing separately obtained history, documenting clinical information in the electronic or other health record, independently interpreting results and communication results to the patient/family/caregiver, or care coordinator. Niles Carrion MD     Procedures: see electronic medical records for all procedures/Xrays and details which were not copied into this note but were reviewed prior to creation of Plan. LABS:  I reviewed today's most current labs and imaging studies.   Pertinent labs include:  Recent Labs     02/04/23 0534 02/03/23  0113 02/02/23  0939   WBC 16.1* 12.5* 13.4*   HGB 7.7* 7.3* 7.8*   HCT 26.6* 25.1* 26.1*    154 141*     Recent Labs     02/04/23 0534 02/03/23  0113 02/02/23  0939   * 138 140   K 3.8 3.5 3.6   CL 97 98 98   CO2 37* 36* 35*   * 114* 91   BUN 7 7 9   CREA 0.89 0.80 0.87   CA 8.3* 8.2* 8.2*       Signed: Niles Carrion MD

## 2023-02-04 NOTE — PROGRESS NOTES
0700. Bedside shift change report given to Laura (oncoming nurse) by Bouchra Song RN (offgoing nurse). Report included the following information SBAR, Kardex, ED Summary, MAR, Recent Results, and Cardiac Rhythm NSR .     1900. End of Shift Note    Bedside shift change report given to Huey Coburn (oncoming nurse) by Indy Hitchcock RN (offgoing nurse). Report included the following information SBAR, Kardex, MAR, Recent Results, and Cardiac Rhythm NSR    Shift worked:  7a-7p     Shift summary and any significant changes:     PICC line Inserted     Concerns for physician to address:       Zone phone for oncoming shift:          Activity:  Activity Level: Bed Rest  Number times ambulated in hallways past shift: 0  Number of times OOB to chair past shift: 0    Cardiac:   Cardiac Monitoring: Yes      Cardiac Rhythm: Sinus Rhythm    Access:  Current line(s): PIV and PICC     Genitourinary:   Urinary status: voiding and external catheter    Respiratory:   O2 Device: BIPAP  Chronic home O2 use?: YES  Incentive spirometer at bedside: NO       GI:  Last Bowel Movement Date: 02/03/23  Current diet:  ADULT DIET Regular; Low Sodium (2 gm); Mashed potatoes with every lunch and dinner  ADULT ORAL NUTRITION SUPPLEMENT Breakfast; Standard 4 oz  ADULT ORAL NUTRITION SUPPLEMENT Lunch; Fortified Pudding  ADULT ORAL NUTRITION SUPPLEMENT Dinner; Frozen Supplement  DIET ONE TIME MESSAGE  Passing flatus: YES  Tolerating current diet: YES       Pain Management:   Patient states pain is manageable on current regimen: YES    Skin:  Gaston Score: 13  Interventions: turn team, increase time out of bed, internal/external urinary devices, and nutritional support     Patient Safety:  Fall Score:  Total Score: 1  Interventions: bed/chair alarm, assistive device (walker, cane, etc), gripper socks, and pt to call before getting OOB       Length of Stay:  Expected LOS: 2d 16h  Actual LOS: [id number] RN

## 2023-02-04 NOTE — PROGRESS NOTES
PICC Education: Explained reason and rationale for PICC placement along with providing education in order to make an informed consent including nature, risks, benefits, potential complications, care and maintenance of PICC line. The opportunity for questions or concerns was given. Patient  gave written consent for PICC procedure to be done at the bedside. Patient verbalizes understanding at this time. Procedure:  Time out completed. Pre procedure assessment done. Maximum sterile barrier precautions observed throughout procedure. Lidocaine 1% 3ml sc given prior to cannulation  Cannulated left basilic vein using ultrasound guidance and modified seldinger technique. Inserted single lumen 4 fr PICC in  left arm using, VPS locations system and 3CG   Patient has sinus rhythm. 3CG Tip Positioning System indicating tall P wave and no negative deflection before P wave which would indicate the PICC tip is properly placed in the distal SVC or the CAJ. PICC tip was confirmed by 2 PICC nurses and 3CG printout was placed on patients chart. Blood return verified and flushed with 20ml NS in each port. Sterile dressing applied with Biopatch, Stat loc, occlusive dressing per protocol. Curios caps applied to each port. Reason for access (long-term IV antibiotics). Complications related to insertion (hx of unsuccessful RUE PICC insertions, pt has generalized erythematous rash, upper and lower body, chest and back. Area at and around PICC insertion as well). Patient tolerated procedure well with minimal blood loss. PICC procedure performed by: Astrid Nye RN [facility] / Vascular Access Nurse  Assisted by: Jessica Sam RN [facility] / Vascular Access Nurse  Left  arm circumference: 40 cm.    Total Catheter Length:  41 cm  Catheter internal length:  41 cm  Catheter external length: 0 cm  PICC catheter occupies 31% of vein  Brand of catheter:  BARD POWER PICC SOLO  Lot #[id number]   REF# [id number]    EXP 2023-11-30. Primary nurse MARIE Tidwell, notified PICC line may be used and to hang new infusion tubing prior to use.       Jaylyn Ramirez RN [facility]  Vascular Access Team

## 2023-02-04 NOTE — PROGRESS NOTES
ADULT PROTOCOL: JET AEROSOL ASSESSMENT    Patient  Rena Pepe     60 y.o.   female     2/4/2023  8:32 AM    Breath Sounds Pre Procedure: Right Breath Sounds: Diminished                               Left Breath Sounds: Diminished    Breath Sounds Post Procedure: Right Breath Sounds: Diminished                                 Left Breath Sounds: Diminished    Breathing pattern: Pre procedure Breathing Pattern: Tachypneic          Post procedure Breathing Pattern: Tachypneic    Heart Rate: Pre procedure Pulse: 82           Post procedure Pulse: 82    Resp Rate: Pre procedure Respirations: 28           Post procedure Respirations: 28    Cough: Pre procedure                 Post procedure      Oxygen: O2 Device: BIPAP   O2 Flow Rate (L/min): 5 l/min         SpO2: Pre procedure SpO2: (!) 89 %   with oxygen              Post procedure SpO2: 94 %  with oxygen    Nebulizer Therapy: Current medications Aerosolized Medications: DuoNeb      Changed: NO    Smoking History:   Social History     Tobacco Use   Smoking Status Former   Smokeless Tobacco Never       Problem List:   Patient Active Problem List   Diagnosis Code    COVID-19 ruled out Z20.822    A-fib ([facility] I48.91    S/P cardiac cath Z98.890    Mixed hyperlipidemia E78.2    Morbid obesity (HCC) E66.01    Acute respiratory failure (HCC) J96.00    Atrial flutter with rapid ventricular response (HCC) I48.92    JOSLYN (obstructive sleep apnea) G47.33    Acute on chronic respiratory failure with hypoxia and hypercapnia (HCC) J96.21, J96.22    SOB (shortness of breath) R06.02    Physical deconditioning R53.81    Counseling regarding advance care planning and goals of care Z71.89    Chronic pain G89.29    Dependence on supplemental oxygen Z99.81    Essential hypertension I10    Heart failure (HCC) I50.9    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

## 2023-02-04 NOTE — PROGRESS NOTES
2330 pt placed on bipap, sats high 80s, Notified RT , increased fio2 to 50%    0200 Pt sat 89%, RT increased fio2 to 55% on bipap/ pt is tolerating well. VS are stable,   Changed Purewik,     Bedside and Verbal shift change report given to [address] [place] [address] Karel (oncoming nurse) by Ahmed Severance RN (offgoing nurse). Report included the following information SBAR, Kardex, Intake/Output, MAR, Recent Results, Med Rec Status, Cardiac Rhythm Afib, and Alarm Parameters .

## 2023-02-05 LAB
ANION GAP SERPL CALC-SCNC: 1 MMOL/L (ref 5–15)
BUN SERPL-MCNC: 8 MG/DL (ref 6–20)
BUN/CREAT SERPL: 9 (ref 12–20)
CALCIUM SERPL-MCNC: 8.1 MG/DL (ref 8.5–10.1)
CHLORIDE SERPL-SCNC: 96 MMOL/L (ref 97–108)
CK SERPL-CCNC: 21 U/L (ref 26–192)
CO2 SERPL-SCNC: 38 MMOL/L (ref 21–32)
CREAT SERPL-MCNC: 0.86 MG/DL (ref 0.55–1.02)
GLUCOSE BLD STRIP.AUTO-MCNC: 108 MG/DL (ref 65–117)
GLUCOSE BLD STRIP.AUTO-MCNC: 167 MG/DL (ref 65–117)
GLUCOSE BLD STRIP.AUTO-MCNC: 99 MG/DL (ref 65–117)
GLUCOSE SERPL-MCNC: 107 MG/DL (ref 65–100)
HCT VFR BLD AUTO: 28.5 % (ref 35–47)
HGB BLD-MCNC: 7.9 G/DL (ref 11.5–16)
POTASSIUM SERPL-SCNC: 3.9 MMOL/L (ref 3.5–5.1)
SERVICE CMNT-IMP: ABNORMAL
SERVICE CMNT-IMP: NORMAL
SERVICE CMNT-IMP: NORMAL
SODIUM SERPL-SCNC: 135 MMOL/L (ref 136–145)
VANCOMYCIN SERPL-MCNC: 26.9 UG/ML

## 2023-02-05 PROCEDURE — 80202 ASSAY OF VANCOMYCIN: CPT

## 2023-02-05 PROCEDURE — 74011250637 HC RX REV CODE- 250/637: Performed by: INTERNAL MEDICINE

## 2023-02-05 PROCEDURE — 74011000250 HC RX REV CODE- 250: Performed by: INTERNAL MEDICINE

## 2023-02-05 PROCEDURE — 74011000258 HC RX REV CODE- 258: Performed by: INTERNAL MEDICINE

## 2023-02-05 PROCEDURE — 82962 GLUCOSE BLOOD TEST: CPT

## 2023-02-05 PROCEDURE — 65270000046 HC RM TELEMETRY

## 2023-02-05 PROCEDURE — 80048 BASIC METABOLIC PNL TOTAL CA: CPT

## 2023-02-05 PROCEDURE — 74011250636 HC RX REV CODE- 250/636: Performed by: STUDENT IN AN ORGANIZED HEALTH CARE EDUCATION/TRAINING PROGRAM

## 2023-02-05 PROCEDURE — 94640 AIRWAY INHALATION TREATMENT: CPT

## 2023-02-05 PROCEDURE — 74011250637 HC RX REV CODE- 250/637: Performed by: STUDENT IN AN ORGANIZED HEALTH CARE EDUCATION/TRAINING PROGRAM

## 2023-02-05 PROCEDURE — 74011000258 HC RX REV CODE- 258: Performed by: STUDENT IN AN ORGANIZED HEALTH CARE EDUCATION/TRAINING PROGRAM

## 2023-02-05 PROCEDURE — 74011000250 HC RX REV CODE- 250: Performed by: STUDENT IN AN ORGANIZED HEALTH CARE EDUCATION/TRAINING PROGRAM

## 2023-02-05 PROCEDURE — 74011250636 HC RX REV CODE- 250/636: Performed by: INTERNAL MEDICINE

## 2023-02-05 PROCEDURE — 36415 COLL VENOUS BLD VENIPUNCTURE: CPT

## 2023-02-05 PROCEDURE — 85018 HEMOGLOBIN: CPT

## 2023-02-05 PROCEDURE — 94660 CPAP INITIATION&MGMT: CPT

## 2023-02-05 PROCEDURE — 51798 US URINE CAPACITY MEASURE: CPT

## 2023-02-05 PROCEDURE — 82550 ASSAY OF CK (CPK): CPT

## 2023-02-05 PROCEDURE — 74011000250 HC RX REV CODE- 250: Performed by: PHYSICIAN ASSISTANT

## 2023-02-05 RX ORDER — NYSTATIN 100000 [USP'U]/G
POWDER TOPICAL 2 TIMES DAILY
Status: DISCONTINUED | OUTPATIENT
Start: 2023-02-05 | End: 2023-02-10 | Stop reason: HOSPADM

## 2023-02-05 RX ADMIN — IPRATROPIUM BROMIDE AND ALBUTEROL SULFATE 3 ML: 2.5; .5 SOLUTION RESPIRATORY (INHALATION) at 07:11

## 2023-02-05 RX ADMIN — Medication 1 AMPULE: at 08:13

## 2023-02-05 RX ADMIN — MIDODRINE HYDROCHLORIDE 10 MG: 5 TABLET ORAL at 08:13

## 2023-02-05 RX ADMIN — ATORVASTATIN CALCIUM 10 MG: 10 TABLET, FILM COATED ORAL at 22:02

## 2023-02-05 RX ADMIN — POTASSIUM BICARBONATE 20 MEQ: 391 TABLET, EFFERVESCENT ORAL at 08:13

## 2023-02-05 RX ADMIN — SODIUM CHLORIDE, PRESERVATIVE FREE 10 ML: 5 INJECTION INTRAVENOUS at 17:33

## 2023-02-05 RX ADMIN — SODIUM CHLORIDE, PRESERVATIVE FREE 10 ML: 5 INJECTION INTRAVENOUS at 22:02

## 2023-02-05 RX ADMIN — Medication: at 08:22

## 2023-02-05 RX ADMIN — VANCOMYCIN HYDROCHLORIDE 500 MG: 500 INJECTION, POWDER, LYOPHILIZED, FOR SOLUTION INTRAVENOUS at 09:00

## 2023-02-05 RX ADMIN — ENOXAPARIN SODIUM 30 MG: 100 INJECTION SUBCUTANEOUS at 11:59

## 2023-02-05 RX ADMIN — SUCRALFATE 1 G: 1 TABLET ORAL at 08:13

## 2023-02-05 RX ADMIN — PANTOPRAZOLE SODIUM 40 MG: 40 TABLET, DELAYED RELEASE ORAL at 08:13

## 2023-02-05 RX ADMIN — SUCRALFATE 1 G: 1 TABLET ORAL at 22:02

## 2023-02-05 RX ADMIN — CAMPHOR, MENTHOL: .5; .5 LOTION TOPICAL at 08:22

## 2023-02-05 RX ADMIN — MIDODRINE HYDROCHLORIDE 10 MG: 5 TABLET ORAL at 17:08

## 2023-02-05 RX ADMIN — Medication 1 CAPSULE: at 08:13

## 2023-02-05 RX ADMIN — NYSTATIN: 100000 POWDER TOPICAL at 12:03

## 2023-02-05 RX ADMIN — NYSTATIN: 100000 POWDER TOPICAL at 17:34

## 2023-02-05 RX ADMIN — SUCRALFATE 1 G: 1 TABLET ORAL at 17:08

## 2023-02-05 RX ADMIN — PANTOPRAZOLE SODIUM 40 MG: 40 TABLET, DELAYED RELEASE ORAL at 17:09

## 2023-02-05 RX ADMIN — FOLIC ACID: 5 INJECTION, SOLUTION INTRAMUSCULAR; INTRAVENOUS; SUBCUTANEOUS at 08:54

## 2023-02-05 RX ADMIN — DAPTOMYCIN 1050 MG: 500 INJECTION, POWDER, LYOPHILIZED, FOR SOLUTION INTRAVENOUS at 17:28

## 2023-02-05 RX ADMIN — CAMPHOR, MENTHOL: .5; .5 LOTION TOPICAL at 22:00

## 2023-02-05 RX ADMIN — IPRATROPIUM BROMIDE AND ALBUTEROL SULFATE 3 ML: 2.5; .5 SOLUTION RESPIRATORY (INHALATION) at 20:27

## 2023-02-05 RX ADMIN — MIDODRINE HYDROCHLORIDE 10 MG: 5 TABLET ORAL at 11:59

## 2023-02-05 RX ADMIN — CASTOR OIL AND BALSAM, PERU: 788; 87 OINTMENT TOPICAL at 22:01

## 2023-02-05 RX ADMIN — FUROSEMIDE 40 MG: 40 TABLET ORAL at 08:13

## 2023-02-05 RX ADMIN — IPRATROPIUM BROMIDE AND ALBUTEROL SULFATE 3 ML: 2.5; .5 SOLUTION RESPIRATORY (INHALATION) at 01:18

## 2023-02-05 RX ADMIN — METOPROLOL TARTRATE 12.5 MG: 25 TABLET, FILM COATED ORAL at 08:13

## 2023-02-05 RX ADMIN — Medication 1 AMPULE: at 22:02

## 2023-02-05 RX ADMIN — CASTOR OIL AND BALSAM, PERU: 788; 87 OINTMENT TOPICAL at 09:00

## 2023-02-05 RX ADMIN — SUCRALFATE 1 G: 1 TABLET ORAL at 11:59

## 2023-02-05 RX ADMIN — IPRATROPIUM BROMIDE AND ALBUTEROL SULFATE 3 ML: 2.5; .5 SOLUTION RESPIRATORY (INHALATION) at 13:39

## 2023-02-05 RX ADMIN — SODIUM CHLORIDE, PRESERVATIVE FREE 10 ML: 5 INJECTION INTRAVENOUS at 06:03

## 2023-02-05 NOTE — PROGRESS NOTES
ADULT PROTOCOL: JET AEROSOL ASSESSMENT    Patient  Latonya Pandya     60 y.o.   female     2/5/2023  8:04 AM    Breath Sounds Pre Procedure: Right Breath Sounds: Diminished                               Left Breath Sounds: Diminished    Breath Sounds Post Procedure: Right Breath Sounds: Diminished                                 Left Breath Sounds: Diminished    Breathing pattern: Pre procedure Breathing Pattern: Regular          Post procedure Breathing Pattern: Regular    Heart Rate: Pre procedure Pulse: 88           Post procedure Pulse: 67    Resp Rate: Pre procedure Respirations: 24           Post procedure Respirations: 24    Cough: Pre procedure                 Post procedure      Oxygen: O2 Device: Nasal cannula   O2 Flow Rate (L/min): (S) 6 l/min (decrease to 5L)         SpO2: Pre procedure SpO2: 100 %   with oxygen              Post procedure SpO2: 99 %  with oxygen    Nebulizer Therapy: Current medications Aerosolized Medications: DuoNeb      Changed: NO    Smoking History:   Social History     Tobacco Use   Smoking Status Former   Smokeless Tobacco Never       Problem List:   Patient Active Problem List   Diagnosis Code    COVID-19 ruled out Z20.822    A-fib ([address] I48.91    S/P cardiac cath Z98.890    Mixed hyperlipidemia E78.2    Morbid obesity (HCC) E66.01    Acute respiratory failure (HCC) J96.00    Atrial flutter with rapid ventricular response (HCC) I48.92    JOSLYN (obstructive sleep apnea) G47.33    Acute on chronic respiratory failure with hypoxia and hypercapnia (HCC) J96.21, J96.22    SOB (shortness of breath) R06.02    Physical deconditioning R53.81    Counseling regarding advance care planning and goals of care Z71.89    Chronic pain G89.29    Dependence on supplemental oxygen Z99.81    Essential hypertension I10    Heart failure (HCC) I50.9    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

## 2023-02-05 NOTE — PROGRESS NOTES
Hospitalist Progress Note    NAME: Elda Luna   :  1960   MRN:  [id number]       Assessment / Plan:    Acute on chronic hypoxic respiratory failure POA  COPD-- end stage/Poor prognosis,   JOSLYN compliant with CPAP  Diastolic heart failure     Patient is currently on 4 L supplemental nasal cannula oxygen. Patient is requiring BiPAP only at nighttime. Patient is back to her baseline oxygen requirements. Septic shock  MSSA bacteremia  Left femoral neck fracture     Off pressors now  Ortho to review CT pelvis-? Aspiration of fluid in left hip -failed attempt by IR ()  S/p Zosyn, was on Nafcillin- changed to Vancomycin per ID   Last Repeat blood cultures () neg x 2 days for now  ID following. Recommended to continue vancomycin in the hospital.  Discharge on daptomycin. Patient did not want hospice. PICC line in place     Acute on chronic anemia:  GI following, high risk for Endoscopy, colonscopy  Occult stool negative. No signs of active bleeding  GI performed EGD. High risk for colonoscopy. Signed off. Hematology following   Has folate and iron deficiency, started on iv folic acid and iv iron last week  S/p transfusions  S/p EGD  by GI- found duodenitis/gastritis - recommends to cont PPI BID + carafate     Erythematous rash- persistent  Has this rash on b/l uper and lower ext, chest as well  ? D/t medication, zosyn switched to Nafcillin switched to Vancomycin      Type II DM:  Continue lispro sliding scale     Paroxysmal afib  CAD:  Continue statin, Metoprolol  Aspirin discontinued given duodenitis and gastritis     Severe obesity  BMI 51  Complicates overall care     Code status: DNR  Prophylaxis: lovenox  Recommended Disposition: Home w/Family. Patient did not want hospice. PICC line placed. Home daptomycin arrangement. Subjective:   Patient comfortably lying in the bed. Patient is requiring 4-5 L supplemental nasal cannula oxygen which is her baseline.   Patient is using BiPAP at nighttime. Patient states that she feeling better now. Patient did not want hospice. PICC line placed. ID recommends continuing vancomycin while in the hospital and to be discharged on daptomycin. Objective:     VITALS:   Last 24hrs VS reviewed since prior progress note. Most recent are:  Patient Vitals for the past 24 hrs:   Temp Pulse Resp BP SpO2   02/05/23 1159 -- 67 -- 105/82 --   02/05/23 1056 98.2 °F (36.8 °C) 68 27 105/82 91 %   02/05/23 0813 -- 73 -- (!) 108/37 --   02/05/23 0712 98.8 °F (37.1 °C) 67 27 (!) 100/44 100 %   02/05/23 0711 -- -- -- -- 100 %   02/05/23 0530 -- -- -- -- 95 %   02/05/23 0512 98.4 °F (36.9 °C) 68 16 122/89 98 %   02/05/23 0119 -- -- -- -- 100 %   02/04/23 2242 98.4 °F (36.9 °C) 68 16 (!) 91/47 91 %   02/04/23 2145 -- -- -- -- 94 %   02/04/23 1931 -- -- -- -- 91 %   02/04/23 1909 98.4 °F (36.9 °C) 73 16 (!) 100/35 94 %   02/04/23 1832 -- -- -- -- 91 %   02/04/23 1714 -- 75 -- (!) 114/48 --   02/04/23 1518 -- -- -- -- 91 %   02/04/23 1441 98.8 °F (37.1 °C) 74 21 (!) 121/55 93 %       Intake/Output Summary (Last 24 hours) at 2/5/2023 1329  Last data filed at 2/5/2023 2502  Gross per 24 hour   Intake --   Output 800 ml   Net -800 ml        I had a face to face encounter and independently examined this patient on 2/5/2023, as outlined below:  PHYSICAL EXAM:  General: Alert, cooperative, no acute distress    EENT:  EOMI. Anicteric sclerae. Resp:  CTA bilaterally, no wheezing or rales. No accessory muscle use  CV:  Regular  rhythm,  No edema  GI:  Soft, Non distended, Non tender. Neurologic:  Alert and oriented X 3, normal speech  Psych:   Good insight. Not anxious nor agitated  Skin:  Erythematous rash all over the body.   No jaundice    Reviewed most current lab test results and cultures  YES  Reviewed most current radiology test results   YES  Review and summation of old records today    NO  Reviewed patient's current orders and MAR    YES  PMH/SH reviewed - no change compared to H&P  ________________________________________________________________________  Care Plan discussed with:    Comments   Patient [address]     Consultant                        Multidiciplinary team rounds were held today with , nursing, pharmacist and clinical coordinator. Patient's plan of care was discussed; medications were reviewed and discharge planning was addressed. I spent a total of 35 minutes on the day of the visit. This includes face to face time and non-face to face time preparing to see the patient such as review of tests, obtaining and/or reviewing separately obtained history, documenting clinical information in the electronic or other health record, independently interpreting results and communication results to the patient/family/caregiver, or care coordinator. Caty Serrano MD     Procedures: see electronic medical records for all procedures/Xrays and details which were not copied into this note but were reviewed prior to creation of Plan. LABS:  I reviewed today's most current labs and imaging studies.   Pertinent labs include:  Recent Labs     02/05/23  0240 02/04/23  0534 02/03/23  0113   WBC  --  16.1* 12.5*   HGB 7.9* 7.7* 7.3*   HCT 28.5* 26.6* 25.1*   PLT  --  174 154     Recent Labs     02/05/23  0240 02/04/23  0534 02/03/23  0113   * 135* 138   K 3.9 3.8 3.5   CL 96* 97 98   CO2 38* 37* 36*   * 123* 114*   BUN 8 7 7   CREA 0.86 0.89 0.80   CA 8.1* 8.3* 8.2*       Signed: Caty Serrano MD

## 2023-02-05 NOTE — PROGRESS NOTES
Vancomycin dosing has been unpredictable resulting in high levels. Per ID can switch to daptomycin now. Starting dapto 8 mg/kg q24h.  Ordered weekly CK

## 2023-02-05 NOTE — PROGRESS NOTES
Problem: Pressure Injury - Risk of  Goal: *Prevention of pressure injury  Description: Document Gaston Scale and appropriate interventions in the flowsheet. Outcome: Progressing Towards Goal  Note: Pressure Injury Interventions:  Sensory Interventions: Keep linens dry and wrinkle-free, Assess changes in LOC, Discuss PT/OT consult with provider, Float heels, Pressure redistribution bed/mattress (bed type), Turn and reposition approx. every two hours (pillows and wedges if needed), Maintain/enhance activity level    Moisture Interventions: Absorbent underpads, Internal/External urinary devices, Apply protective barrier, creams and emollients, Check for incontinence Q2 hours and as needed    Activity Interventions: Increase time out of bed, Pressure redistribution bed/mattress(bed type), PT/OT evaluation    Mobility Interventions: Assess need for specialty bed, Pressure redistribution bed/mattress (bed type), PT/OT evaluation, Turn and reposition approx. every two hours(pillow and wedges), HOB 30 degrees or less, Float heels    Nutrition Interventions: Document food/fluid/supplement intake, Offer support with meals,snacks and hydration, Discuss nutritional consult with provider    Friction and Shear Interventions: Foam dressings/transparent film/skin sealants, Apply protective barrier, creams and emollients, Lift sheet, Lift team/patient mobility team, Minimize layers                Problem: Patient Education: Go to Patient Education Activity  Goal: Patient/Family Education  Outcome: Progressing Towards Goal     Problem: Falls - Risk of  Goal: *Absence of Falls  Description: Document Anna Fall Risk and appropriate interventions in the flowsheet.   Outcome: Progressing Towards Goal  Note: Fall Risk Interventions:  Mobility Interventions: OT consult for ADLs, PT Consult for mobility concerns, PT Consult for assist device competence         Medication Interventions: Bed/chair exit alarm, Patient to call before getting OOB    Elimination Interventions: Call light in reach              Problem: Patient Education: Go to Patient Education Activity  Goal: Patient/Family Education  Outcome: Progressing Towards Goal     Problem: Infection - Risk of, Central Venous Catheter-Associated Bloodstream Infection  Goal: *Absence of infection signs and symptoms  Outcome: Progressing Towards Goal     Problem: Patient Education: Go to Patient Education Activity  Goal: Patient/Family Education  Outcome: Progressing Towards Goal

## 2023-02-05 NOTE — PROGRESS NOTES
End of Shift Note    Bedside shift change report given to jamie (oncoming nurse) by Fozia Zamarripa (offgoing nurse).   Report included the following information SBAR    Shift worked:  night     Shift summary and any significant changes:     Transitioned to nasal cannula 6L early morning while pt is awake, tolerating well,   Chg done, needs nystatin    Concerns for physician to address:       Zone phone for oncoming shift:            Fozia Zamarripa

## 2023-02-05 NOTE — PROGRESS NOTES
Pharmacy Automatic Renal Dosing Protocol - Antimicrobials    Indication for Antimicrobials: bacteremia     Current Regimen of Each Antimicrobial:   Vancomycin - pharmacy to dose; started ; day 5    Previous Antimicrobial Therapy:   nafcillin -  zosyn -  Nafcillin -    Goal Level: VANCOMYCIN TROUGH GOAL RANGE    Vancomycin Trough: 15 - 20 mcg/mL  (AUC: 400 - 600 mg/hr/Liter/day)     Date Dose & Interval Measured (mcg/mL) Extrapolated (mcg/mL)   2/3 0113 1g q 12 h  894/29.2    500 mg q12h 26 522           Significant Cultures:    blood cx MSSA   blood cx MSSA   blood cx MSSA   paired blood cx: NGTD, pending    blood cx NGTD pending   paired blood cx: NGTD, pending       Paralysis, amputations, malnutrition: -    Labs:  Recent Labs     23  0240 23  0534 23  0113   CREA 0.86 0.89 0.80   BUN 8 7 7   WBC  --  16.1* 12.5*     Temp (24hrs), Av.5 °F (36.9 °C), Min:98.2 °F (36.8 °C), Max:98.8 °F (37.1 °C)    Creatinine Clearance (mL/min) or Dialysis: 55.4 mL/min (IBW)    Impression/Plan:   Vancomycin level drawn early was 26. Extrapolates to therapeutic AUC  Will hold one dose of vanc and restart 2/6 AM  Plan is to start dapto tomorrow  Vanc level 2/6 with AM labs in case dapto not started  MSSA, but allergic to cefazolin and nafcillin   Will follow the repeat blood cx  Antimicrobial stop date pending     Pharmacy will follow daily and adjust medications as appropriate for renal function and/or serum levels.     Thank you,  Anjel Weber, PHARMD

## 2023-02-05 NOTE — PROGRESS NOTES
0700 Bedside shift change report given to Laura (oncoming nurse) by Juwan Mock RN (offgoing nurse). Report included the following information SBAR, Kardex, ED Summary, MAR, Recent Results, and Cardiac Rhythm NSR .        02/05/23 0712   Vitals   Temp 98.8 °F (37.1 °C)   Temp Source Oral   Pulse (Heart Rate) 67   Resp Rate 27   O2 Sat (%) 100 %   Level of Consciousness 0   BP (!) 100/44   MAP (Monitor) (!) 56   MAP (Calculated) (!) 63   MEWS Score 3   Oxygen Therapy   Pulse via Oximetry 67 beats per minute     Scheduled midodrine with AM meds    0930. Messaged DR New Harding to clarify 9am vancomycin dose due to provider held status at 2691. Dose to be given per his order.    1900. Bedside shift change [facility] ([facility] nurse) by Laura (offgoing nurse). Report included the following information SBAR, Kardex, MAR, Recent Results, and Cardiac Rhythm NSR .

## 2023-02-06 LAB
ANION GAP SERPL CALC-SCNC: 5 MMOL/L (ref 5–15)
BACTERIA SPEC CULT: NORMAL
BUN SERPL-MCNC: 10 MG/DL (ref 6–20)
BUN/CREAT SERPL: 9 (ref 12–20)
CALCIUM SERPL-MCNC: 8.3 MG/DL (ref 8.5–10.1)
CHLORIDE SERPL-SCNC: 93 MMOL/L (ref 97–108)
CO2 SERPL-SCNC: 37 MMOL/L (ref 21–32)
CREAT SERPL-MCNC: 1.08 MG/DL (ref 0.55–1.02)
GLUCOSE BLD STRIP.AUTO-MCNC: 124 MG/DL (ref 65–117)
GLUCOSE BLD STRIP.AUTO-MCNC: 144 MG/DL (ref 65–117)
GLUCOSE BLD STRIP.AUTO-MCNC: 154 MG/DL (ref 65–117)
GLUCOSE BLD STRIP.AUTO-MCNC: 94 MG/DL (ref 65–117)
GLUCOSE SERPL-MCNC: 96 MG/DL (ref 65–100)
POTASSIUM SERPL-SCNC: 4.4 MMOL/L (ref 3.5–5.1)
SERVICE CMNT-IMP: ABNORMAL
SERVICE CMNT-IMP: NORMAL
SERVICE CMNT-IMP: NORMAL
SODIUM SERPL-SCNC: 135 MMOL/L (ref 136–145)

## 2023-02-06 PROCEDURE — 74011250637 HC RX REV CODE- 250/637: Performed by: INTERNAL MEDICINE

## 2023-02-06 PROCEDURE — 74011000258 HC RX REV CODE- 258: Performed by: STUDENT IN AN ORGANIZED HEALTH CARE EDUCATION/TRAINING PROGRAM

## 2023-02-06 PROCEDURE — 77010033678 HC OXYGEN DAILY

## 2023-02-06 PROCEDURE — 74011250637 HC RX REV CODE- 250/637: Performed by: STUDENT IN AN ORGANIZED HEALTH CARE EDUCATION/TRAINING PROGRAM

## 2023-02-06 PROCEDURE — 74011000250 HC RX REV CODE- 250: Performed by: PHYSICIAN ASSISTANT

## 2023-02-06 PROCEDURE — 65270000046 HC RM TELEMETRY

## 2023-02-06 PROCEDURE — 80048 BASIC METABOLIC PNL TOTAL CA: CPT

## 2023-02-06 PROCEDURE — 74011000258 HC RX REV CODE- 258: Performed by: INTERNAL MEDICINE

## 2023-02-06 PROCEDURE — 74011250636 HC RX REV CODE- 250/636: Performed by: INTERNAL MEDICINE

## 2023-02-06 PROCEDURE — 74011250636 HC RX REV CODE- 250/636: Performed by: STUDENT IN AN ORGANIZED HEALTH CARE EDUCATION/TRAINING PROGRAM

## 2023-02-06 PROCEDURE — 36415 COLL VENOUS BLD VENIPUNCTURE: CPT

## 2023-02-06 PROCEDURE — 74011000250 HC RX REV CODE- 250: Performed by: STUDENT IN AN ORGANIZED HEALTH CARE EDUCATION/TRAINING PROGRAM

## 2023-02-06 PROCEDURE — 94640 AIRWAY INHALATION TREATMENT: CPT

## 2023-02-06 PROCEDURE — 82962 GLUCOSE BLOOD TEST: CPT

## 2023-02-06 PROCEDURE — 74011000250 HC RX REV CODE- 250: Performed by: INTERNAL MEDICINE

## 2023-02-06 PROCEDURE — 94660 CPAP INITIATION&MGMT: CPT

## 2023-02-06 RX ORDER — SUCRALFATE 1 G/1
1 TABLET ORAL
Qty: 120 TABLET | Refills: 2 | Status: SHIPPED | OUTPATIENT
Start: 2023-02-06 | End: 2023-02-13

## 2023-02-06 RX ORDER — PANTOPRAZOLE SODIUM 40 MG/1
40 TABLET, DELAYED RELEASE ORAL
Qty: 60 TABLET | Refills: 2 | Status: SHIPPED | OUTPATIENT
Start: 2023-02-06 | End: 2023-02-13

## 2023-02-06 RX ORDER — MIDODRINE HYDROCHLORIDE 10 MG/1
10 TABLET ORAL
Qty: 90 TABLET | Refills: 0 | Status: SHIPPED | OUTPATIENT
Start: 2023-02-06 | End: 2023-02-13

## 2023-02-06 RX ORDER — SODIUM CHLORIDE 9 MG/ML
100 INJECTION, SOLUTION INTRAVENOUS CONTINUOUS
Status: DISCONTINUED | OUTPATIENT
Start: 2023-02-06 | End: 2023-02-10 | Stop reason: HOSPADM

## 2023-02-06 RX ADMIN — METOPROLOL TARTRATE 12.5 MG: 25 TABLET, FILM COATED ORAL at 12:48

## 2023-02-06 RX ADMIN — SUCRALFATE 1 G: 1 TABLET ORAL at 17:59

## 2023-02-06 RX ADMIN — Medication 1 CAPSULE: at 12:47

## 2023-02-06 RX ADMIN — FOLIC ACID: 5 INJECTION, SOLUTION INTRAMUSCULAR; INTRAVENOUS; SUBCUTANEOUS at 12:44

## 2023-02-06 RX ADMIN — SODIUM CHLORIDE, PRESERVATIVE FREE 10 ML: 5 INJECTION INTRAVENOUS at 06:21

## 2023-02-06 RX ADMIN — POTASSIUM BICARBONATE 20 MEQ: 391 TABLET, EFFERVESCENT ORAL at 12:47

## 2023-02-06 RX ADMIN — CASTOR OIL AND BALSAM, PERU: 788; 87 OINTMENT TOPICAL at 20:00

## 2023-02-06 RX ADMIN — Medication 1 AMPULE: at 12:42

## 2023-02-06 RX ADMIN — CAMPHOR, MENTHOL: .5; .5 LOTION TOPICAL at 12:43

## 2023-02-06 RX ADMIN — CASTOR OIL AND BALSAM, PERU: 788; 87 OINTMENT TOPICAL at 12:42

## 2023-02-06 RX ADMIN — NYSTATIN: 100000 POWDER TOPICAL at 19:58

## 2023-02-06 RX ADMIN — ENOXAPARIN SODIUM 30 MG: 100 INJECTION SUBCUTANEOUS at 12:44

## 2023-02-06 RX ADMIN — PANTOPRAZOLE SODIUM 40 MG: 40 TABLET, DELAYED RELEASE ORAL at 18:08

## 2023-02-06 RX ADMIN — SUCRALFATE 1 G: 1 TABLET ORAL at 21:56

## 2023-02-06 RX ADMIN — IPRATROPIUM BROMIDE AND ALBUTEROL SULFATE 3 ML: 2.5; .5 SOLUTION RESPIRATORY (INHALATION) at 02:43

## 2023-02-06 RX ADMIN — SUCRALFATE 1 G: 1 TABLET ORAL at 12:50

## 2023-02-06 RX ADMIN — IPRATROPIUM BROMIDE AND ALBUTEROL SULFATE 3 ML: 2.5; .5 SOLUTION RESPIRATORY (INHALATION) at 13:30

## 2023-02-06 RX ADMIN — SODIUM CHLORIDE 100 ML/HR: 9 INJECTION, SOLUTION INTRAVENOUS at 17:58

## 2023-02-06 RX ADMIN — SODIUM CHLORIDE, PRESERVATIVE FREE 10 ML: 5 INJECTION INTRAVENOUS at 14:00

## 2023-02-06 RX ADMIN — DAPTOMYCIN 1000 MG: 500 INJECTION, POWDER, LYOPHILIZED, FOR SOLUTION INTRAVENOUS at 18:01

## 2023-02-06 RX ADMIN — ENOXAPARIN SODIUM 30 MG: 100 INJECTION SUBCUTANEOUS at 00:10

## 2023-02-06 RX ADMIN — PANTOPRAZOLE SODIUM 40 MG: 40 TABLET, DELAYED RELEASE ORAL at 12:50

## 2023-02-06 RX ADMIN — ENOXAPARIN SODIUM 30 MG: 100 INJECTION SUBCUTANEOUS at 22:11

## 2023-02-06 RX ADMIN — NYSTATIN: 100000 POWDER TOPICAL at 12:52

## 2023-02-06 RX ADMIN — MIDODRINE HYDROCHLORIDE 10 MG: 5 TABLET ORAL at 17:59

## 2023-02-06 RX ADMIN — SODIUM CHLORIDE, PRESERVATIVE FREE 10 ML: 5 INJECTION INTRAVENOUS at 21:57

## 2023-02-06 RX ADMIN — MIDODRINE HYDROCHLORIDE 10 MG: 5 TABLET ORAL at 08:00

## 2023-02-06 RX ADMIN — FUROSEMIDE 40 MG: 40 TABLET ORAL at 12:47

## 2023-02-06 RX ADMIN — IPRATROPIUM BROMIDE AND ALBUTEROL SULFATE 3 ML: 2.5; .5 SOLUTION RESPIRATORY (INHALATION) at 19:56

## 2023-02-06 RX ADMIN — MIDODRINE HYDROCHLORIDE 10 MG: 5 TABLET ORAL at 12:51

## 2023-02-06 RX ADMIN — Medication 1 AMPULE: at 20:00

## 2023-02-06 RX ADMIN — CAMPHOR, MENTHOL: .5; .5 LOTION TOPICAL at 20:00

## 2023-02-06 RX ADMIN — SUCRALFATE 1 G: 1 TABLET ORAL at 07:30

## 2023-02-06 RX ADMIN — IPRATROPIUM BROMIDE AND ALBUTEROL SULFATE 3 ML: 2.5; .5 SOLUTION RESPIRATORY (INHALATION) at 07:54

## 2023-02-06 NOTE — CONSULTS
Pulmonary, Critical Care, and Sleep Medicine      Name: Rosa Brandon MRN: [id number]   : 1960 Hospital: [facility]   Date: 2023  Admission date: 2023 Hospital Day: 17       History:     23: Medical records and data reviewed. Pt no longer able to go to physician offices. Followed by home visiting physicians? Had Trilogy machine at home. Likes CPAP machine here better. Found to be eating when fully supine. Pt strongly advised to sit up for meals. High risk of aspiration. 23: We were asked to re evaluate the pt for worsening hypoxia. When seen she was on the bipap with fio2 of 40% . She was only able to give limited hx. She felt like her breathing was not comfortable. NO sputum production. No other overt changes. Discussed with her bedside nurse Armin Allen. WE were asked to reconsult on the pt. IMPRESSION:   Acute chronic respiratory failure with Hypoxia and Hypercapnea; home O2, NIV, had worsening hypoxia today. She was on NIV when seen, Fio2 at 40%. Pox was 88-90%. Right basilar atelectasis. Possible issues with Volume overload and fluid retention in the context of morbid obesity. Has been with anasarca. Asthma (vs COPD) not acutely exacerbated; normal eosinophils, she is pretty clear anteriorly when seen this pm. .   Former smoker  Suspected JOSLYN and /or OHS  (COPD OHS Thoracic Restrictive disease ) Restrictive disease from obesity J84.4 and E 66.8  Afib, refractory  DM   Non-ambulatory at baseline due to arthritis, Obesity, SOB ; bedbound; wear adult diapers  Discussed with nurse and RT. Morbid Obesity Body mass index is 43.22 kg/m². RECOMMENDATIONS/PLAN:   Continue noninvasive ventilation, Will adjust fio2 to keep pox over 90%. If off the bipap, will place on midflow. Agree with diuresis  Will repeat CXR in am.   Repeat labs in am.   Follow up with home visiting physician group.  Will not be able to recertify O2 or NIV since she no longer comes to our office. Aspiration precautions  bronchial hygiene with respiratory therapy techniques, bronchodilators  DVT prophylaxis  Prescription drug management with home med reconciliation reviewed          [x] High complexity decision making was performed  [x] See my orders for details      Subjective/Initial History:     I was asked by Gamaliel Dior DO to see Kajal Geronimo  a 60 y.o.  female in consultation for a chief complaint of respiratory failure    Excerpts from admission 1/21/2023 or consult notes as follows:     \"   Concepcion Headley is a 60 y.o.  female with pertinent past medical history of chronic hypoxemic respiratory failure on 4 L/min home oxygen therapy secondary to COPD, diabetes mellitus, essential hypertension, hyperlipidemia, JOSLYN compliant with CPAP, history duodenal ulcers, chronic anemia of indeterminate etiology who presents after routine lab work performed yesterday demonstrated hemoglobin of 5.3. Patient denies any sources of blood loss including hematochezia, melena, hematuria, hematemesis, epistaxis, bruising/bleeding. She reports she has had lower than normal blood pressures at home with systolics in the 14U when her baseline is in the 130s, but denies any other associated symptoms. Medication reconciliation does not list any anticoagulation, but patient reports she is taking a blood thinner-unsure which one. ROS otherwise negative. Patient denies tobacco, alcohol, or illicit drug use. In the ED, patient afebrile and hemodynamically stable saturating upper 90s on baseline 4 L/min home oxygen therapy. Labs demonstrate: WBC 9.3, hemoglobin 5.3, platelets 647, sodium 140, potassium 4.4, chloride 95, bicarb 42 (chronic), glucose 152, BUN 17, creatinine 0.89, lactic acid 3.86 improved to 2.4 with IV fluids and 1 unit packed red blood cells. Fecal occult blood negative. UA not reflexed to culture.   CXR demonstrates diffuse hazy interstitial opacities suggestive of edema with bandlike right upper lobe atelectasis. \"    Patient poor historian. Patient sent home April 2022 with noninvasive ventilation. Patient thinks she is on CPAP. Now on BiPAP. She has never made it back to the office for clinic follow-up. [employer] company is [employer]. She does not recall having had a sleep study. At 1 point she weighed 310 pounds. She states she lost 50 pounds. Weight on admission 282 pounds. BMI 50 kg/m². Patient says she is bedbound. Daughter helps her at home. Daughter has back problems. Patient without any stomach pain. She is thirsty all the time. She has no teeth. Last saw Dr. Ramos Clements in the office December 2020. Echocardiogram April 2022 left ejection fraction 50 to 65%     PAST MEDICAL HISTORY:  Significant for AFib, CHF, diabetes, hypertension, obstructive sleep apnea, COPD    Allergies   Allergen Reactions    Cefepime Rash     tolerated nafcillin 1/2023    Nafcillin Rash    Cefazolin Rash    Other Plant, Animal, Environmental Rash     Ivory soap caused rash on hands.     Zosyn [Piperacillin-Tazobactam] Rash     Possible rash to zosyn, also received IV contrast and blood  Tolerated nafcillin before/after 1/2023        MAR reviewed and pertinent medications noted or modified as needed       Current Facility-Administered Medications   Medication    DAPTOmycin (CUBICIN) 1,000 mg in 0.9% sodium chloride 50 mL IVPB    0.9% sodium chloride infusion    nystatin (MYCOSTATIN) 100,000 unit/gram powder    sucralfate (CARAFATE) tablet 1 g    camphor-menthoL (SARNA) 0.5-0.5 % lotion    albuterol-ipratropium (DUO-NEB) 2.5 MG-0.5 MG/3 ML    L.acidophilus-paracasei-S.thermophil-bifidobacter (RISAQUAD) 8 billion cell capsule    oxyCODONE-acetaminophen (PERCOCET) 5-325 mg per tablet 1 Tablet    potassium bicarb-citric acid (EFFER-K) tablet 20 mEq    folic acid (FOLVITE) 1 mg in 0.9% sodium chloride 50 mL ivpb    furosemide (LASIX) tablet 40 mg    midodrine (PROAMATINE) tablet 10 mg    enoxaparin (LOVENOX) injection 30 mg    alcohol 62% (NOZIN) nasal  1 Ampule    insulin lispro (HUMALOG) injection    glucose chewable tablet 16 g    glucagon (GLUCAGEN) injection 1 mg    metoprolol tartrate (LOPRESSOR) tablet 12.5 mg    [Held by provider] atorvastatin (LIPITOR) tablet 10 mg    pantoprazole (PROTONIX) tablet 40 mg    sodium chloride (NS) flush 5-40 mL    sodium chloride (NS) flush 5-40 mL    acetaminophen (TYLENOL) tablet 650 mg    Or    acetaminophen (TYLENOL) suppository 650 mg    polyethylene glycol (MIRALAX) packet 17 g    ondansetron (ZOFRAN ODT) tablet 4 mg    Or    ondansetron (ZOFRAN) injection 4 mg    dextrose 10% infusion 0-250 mL    balsam peru-castor oiL (VENELEX) ointment      Patient PCP: Unknown, Provider  PMH:  has a past medical history of Acute on chronic respiratory failure with hypoxia and hypercapnia ([facility] (11/20/2020), Atrial fibrillation ([facility] (11/4/2020), CHF (congestive heart failure) ([facility] [address] [facility] Diabetes ([facility] [address] Hypertension, Ill-defined condition, Ill-defined condition, JOSLYN (obstructive sleep apnea) (11/20/2020), Other and unspecified symptoms and signs involving general sensations and perceptions, and S/P cardiac cath (11/4/2020). PSH:   has a past surgical history that includes pr compre ep eval abltj 3d mapg tx svt (N/A, 11/23/2020); pr icar catheter ablation arrhythmia add on (N/A, 11/23/2020); pr intracardiac electrophysiologic 3d mapping (N/A, 11/23/2020); pr compre electrophysiol xm w/left atrial pacng/rec (N/A, 11/23/2020); and pr programmed stimj & pacg after iv drug nfs (N/A, 11/23/2020). FHX: family history is not on file. SHX:  reports that she has quit smoking. She has never used smokeless tobacco. She reports that she does not drink alcohol and does not use drugs. ROS:A comprehensive review of systems was negative except for that written in the HPI.     Objective:     Vital Signs: Telemetry:     Intake/Output:   Visit Vitals  BP (!) 110/51   Pulse 81   Temp 100.4 °F (38 °C)   Resp (!) 37   Ht 5' 3\" (1.6 m)   Wt 110.7 kg (244 lb)   SpO2 (!) 85%   Breastfeeding No   BMI 43.22 kg/m²       Temp (24hrs), Av.1 °F (37.3 °C), Min:98.3 °F (36.8 °C), Max:100.4 °F (38 °C)        O2 Device: Hi flow nasal cannula O2 Flow Rate (L/min): 6 l/min       Wt Readings from Last 4 Encounters:   23 110.7 kg (244 lb)   06/15/22 136.8 kg (301 lb 9.6 oz)   22 135.5 kg (298 lb 11.6 oz)   22 134.7 kg (297 lb)          Intake/Output Summary (Last 24 hours) at 2023 1537  Last data filed at 2023 0556  Gross per 24 hour   Intake --   Output 500 ml   Net -500 ml         Last shift:      No intake/output data recorded. Last 3 shifts:  1901 -  0700  In: -   Out: 900 [Urine:900]       Physical Exam:   General:  female; cooperative;  on NIV when seen. HEENT: NCAT, no dentition, lips and mucosa dry  Eyes: anicteric; conjunctiva clear  Neck: no nodes, no accessory MM use. Chest: no deformity, thick  Cardiac: regular rate, rhythm; no murmur  Lungs: distant breath sounds; no wheezes, deisy,  no rhonchi  Abd: soft, NT, hypoactive BS, OBESE,  Ext: 2-3+ edema; no joint swelling; No clubbing, has dressing to the RLE. : No irwin,   Neuro: fluent,  follows commands; generalized weakness  Psych- no agitation, oriented to person;   Skin: warm, dry, no cyanosis; seems flush over upper chest.   Pulses: 1-2+ Bilateral pedal, radial  Capillary: brisk; pale    Labs:    Recent Labs     23  0240 23  0534   WBC  --  16.1*   HGB 7.9* 7.7*   PLT  --  174       Recent Labs     23  0454 23  0240 23  0534   * 135* 135*   K 4.4 3.9 3.8   CL 93* 96* 97   CO2 37* 38* 37*   GLU 96 107* 123*   BUN 10 8 7   CREA 1.08* 0.86 0.89   CA 8.3* 8.1* 8.3*       No results for input(s): PH, PCO2, PO2, HCO3, FIO2 in the last 72 hours.   Recent Labs     23  1537   CPK 21*     Lab Results   Component Value Date/Time    BNP 14 04/19/2009 05:00 PM        Lab Results   Component Value Date/Time    Culture result: NO GROWTH 6 DAYS 01/31/2023 02:54 AM    Culture result: NO GROWTH 6 DAYS 01/29/2023 12:38 PM    Culture result: NO GROWTH 6 DAYS 01/27/2023 04:45 AM     Lab Results   Component Value Date/Time    TSH 1.88 09/08/2014 02:20 PM       Imaging:    CXR Results  (Last 48 hours)                 02/04/23 2024  XR CHEST PORT Final result    Impression:  Central venous acces catheter in position for use. No pneumothorax       No other significant interval change. Narrative:  Clinical history: Central venous catheter placement   INDICATION:   Central venous catheter placement   COMPARISON: 1/31/2023       FINDINGS:   AP portable upright view of the chest demonstrates a stable enlarged   cardiopericardial silhouette. The lungs are unchanged in overall appearance. .   Atelectasis on the right. Left subclavian venous access catheter is in place. Catheter tip is in appropriate position for use. There is no associated   pneumothorax. The osseous structures are unremarkable. Patient is on a cardiac   monitor. Please note that this dictation was completed with [employer], the computer voice recognition software. Quite often unanticipated grammatical, syntax, homophones, and other interpretive errors are inadvertently transcribed by the computer software. Please disregard these errors. Please excuse any errors that have escaped final proofreading  ______________________________________________________________________    2-4-23:   COMPARISON: 1/31/2023     FINDINGS:  AP portable upright view of the chest demonstrates a stable enlarged  cardiopericardial silhouette. The lungs are unchanged in overall appearance. .  Atelectasis on the right. Left subclavian venous access catheter is in place. Catheter tip is in appropriate position for use. There is no associated  pneumothorax.  The osseous structures are unremarkable. Patient is on a cardiac  monitor. IMPRESSION  Central venous acces catheter in position for use. No pneumothorax     No other significant interval change. Thank you for allowing us to participate in the care of this patient.       This care involved high complexity medical decision making: I personally:  Reviewed the flowsheet and previous days notes  Reviewed and summarized records or history from previous days note or discussions with staff, family  High Risk Drug therapy requiring intensive monitoring for toxicity: eg steroids, antibiotics  Reviewed and/or ordered Clinical lab tests  Reviewed images and/or ordered Radiology tests  Reviewed and/or adjusted NiPPV settings  discussed my assessment/management with : Nursing, for coordination of care      Reyes Hatfield MD

## 2023-02-06 NOTE — PROGRESS NOTES
Transition of Care Plan:     RUR: 21% \"high risk\"  Arturo Driscoll 53 with [facility] for [facility]. If SNF or IPR: Date FOC offered:  Date FOC received:  Date authorization started with reference number:  Date authorization received and expires:  Accepting facility:  Follow up appointments: PCP & Specialists as needed  DME needed: Pt has Hospital Bed and is bed bound  Transportation at Discharge: Stretcher transport will need to be arranged  Keys or means to access home: Pt has access to home  IM Medicare Letter: 2nd IM needed at d/c  Is patient a [place] and connected with the [place]? N/A  Caregiver Contact: Pt's daughter Sharif [place]: [phone]  Discharge Caregiver contacted prior to discharge? yes  Care Conference needed?: Not at this time    Update 2 pm:  MD to keep pt for one day and d/c tomorrow. CM will continue to follow up. Update 1pm: CM secured hospital to home at 3pm. CM met with assigned RN who reported that pt is on 40 litres of O2 ON Bipap. CM has reached out to MD via perfect [employer] to confirm whether pt is d/c today. CM will continue to follow up. Update 12pm: [employer] has approved for IVABX. Pt has a co-pay $20.59/day for initial 7 day fill. [employer] to offer education to pt and daughter. Daughter contacted and given updates. Daughter is agreeable with the co-pay. Daughter notified that [employer] will do an education. [employer] to call daughter and schedule a virtual education. [facility] has accepted pt back. CM awaiting on eduction to be completed by [employer]. CM to set up transport. CM will continue to follow up. Initial note: Chart reviewed for updates. Order for IVABX placed on Friday 02/03/23. CM has sent referral to [employer] via [employer] pending approval.  JUAN order for [facility] SN sent via [employer] pending acceptance. CM will continue to follow up. Care Management Interventions  PCP Verified by CM:  Yes  Palliative Care Criteria Met (RRAT>21 & CHF Dx)?: No  Mode of Transport at Discharge: [facility] Transport Time of Discharge: Let[place] (CM Consult): Discharge Planning  MyChart Signup: No  Discharge Durable Medical Equipment: No  Health Maintenance Reviewed:  (Pt has DME at home)  Physical Therapy Consult: No  Occupational Therapy Consult: No  Speech Therapy Consult: No  Support Systems: Child(dejuan) (Pt's daughter to provide transport at d/c)  Confirm Follow Up Transport: Other (see comment) ([facility] to be secured for transport)  The Plan for Transition of Care is Related to the Following Treatment Goals : [facility] [address] [facility] Provided?: No  Discharge Location  Patient Expects to be Discharged to[de-identified] Home with home health (Pt is d/c home with [address])     DOROTHY Martin    [phone]    DOROTHY Martin    [phone]

## 2023-02-06 NOTE — PROGRESS NOTES
Hematology Oncology Progress Note    Follow up for: anemia    Chart notes reviewed since last visit. Case discussed with following: Dr. Brodie Sow    Patient complains of the following:  got moved back onto BiPap earlier today when became hypoxic and anxious (not sure which came first)    Additional concerns noted by the staff:      Patient Vitals for the past 24 hrs:   BP Temp Pulse Resp SpO2 Weight   02/06/23 1348 -- -- -- -- -- 110.7 kg (244 lb)   02/06/23 1330 -- -- 81 (!) 37 91 % --   02/06/23 1247 (!) 110/51 100.4 °F (38 °C) 80 (!) 31 (!) 89 % --   02/06/23 1121 -- -- -- -- 90 % --   02/06/23 0800 -- -- 75 -- (!) 87 % --   02/06/23 0758 -- -- 77 -- (!) 89 % --   02/06/23 0756 -- -- 73 -- (!) 88 % --   02/06/23 0754 -- -- 74 -- 92 % --   02/06/23 0752 -- -- 73 -- (!) 85 % --   02/06/23 0747 -- -- 74 -- (!) 83 % --   02/06/23 0740 -- -- 75 -- (!) 86 % --   02/06/23 0739 (!) 110/46 99 °F (37.2 °C) 75 25 (!) 86 % --   02/06/23 0509 (!) 93/31 -- 69 18 90 % --   02/06/23 0457 (!) 191/149 98.3 °F (36.8 °C) 72 18 (!) 85 % --   02/06/23 0254 -- -- -- -- 90 % --   02/06/23 0240 -- -- -- -- (!) 87 % --   02/05/23 2300 (!) 102/40 98.7 °F (37.1 °C) 69 18 96 % --   02/05/23 2237 -- -- -- -- 94 % --   02/05/23 2040 (!) 116/43 99.3 °F (37.4 °C) 70 (!) 36 93 % --   02/05/23 2023 -- -- -- -- (!) 85 % --   02/05/23 1708 (!) 100/40 -- 69 -- -- --   02/05/23 1519 (!) 103/43 98.8 °F (37.1 °C) 68 27 94 % --         Physical Examination:  General: on BiPap and tachypneic. O2 sat at 88% on monitor. HEENT: NC, AT   Neck: supple  Resp: tachypneic distant anteriorly  Abd Obese, + BS, size would preclude accurate assessment of liver and spleen size  Skin: bright erythematous macular rash on chest and b/l UE volar surfaces, progressed on chest with dry desquamation noted. Ext 1-2+ pitting edema b/l . Feet quite puffy  Neuro: able to sqeeze my hands bilaterally with good . Says no movement in legs. Psych: alert, conversant.  Wants to go home. Labs:  Recent Results (from the past 24 hour(s))   CK    Collection Time: 02/05/23  3:37 PM   Result Value Ref Range    CK 21 (L) 26 - 192 U/L   GLUCOSE, POC    Collection Time: 02/05/23  5:36 PM   Result Value Ref Range    Glucose (POC) 167 (H) 65 - 117 mg/dL    Performed by Jenn Walker PCT    GLUCOSE, POC    Collection Time: 02/06/23 12:11 AM   Result Value Ref Range    Glucose (POC) 124 (H) 65 - 117 mg/dL    Performed by Rosa Huddleston RN    METABOLIC PANEL, BASIC    Collection Time: 02/06/23  4:54 AM   Result Value Ref Range    Sodium 135 (L) 136 - 145 mmol/L    Potassium 4.4 3.5 - 5.1 mmol/L    Chloride 93 (L) 97 - 108 mmol/L    CO2 37 (H) 21 - 32 mmol/L    Anion gap 5 5 - 15 mmol/L    Glucose 96 65 - 100 mg/dL    BUN 10 6 - 20 MG/DL    Creatinine 1.08 (H) 0.55 - 1.02 MG/DL    BUN/Creatinine ratio 9 (L) 12 - 20      eGFR 58 (L) >60 ml/min/1.73m2    Calcium 8.3 (L) 8.5 - 10.1 MG/DL   GLUCOSE, POC    Collection Time: 02/06/23  6:09 AM   Result Value Ref Range    Glucose (POC) 94 65 - 117 mg/dL    Performed by Ernestina Feliz (TIESHA RN)    GLUCOSE, POC    Collection Time: 02/06/23 12:08 PM   Result Value Ref Range    Glucose (POC) 144 (H) 65 - 117 mg/dL    Performed by Juana Rios PCT       EGD 2/1/23 Dr Hunter Garcia:  Findings:      Esophagus: The esophageal mucosa in the proximal, mid and distal esophagus is normal.   The squamo-columnar junction is at 44 cm where the Z-line was noted. Stomach: The gastric mucosa has moderately diffuse erythema compatible with gastritis in the body. Multiple biopsies taken. Antrum has spoke-like radial erythema(This does not look like GAVE though): biopsies taken  The fundus was found to be normal with no lesions noted on retroflexion. The angularis is normal as well. Duodenum:   The bulbar mucosa has patchy erythema with a few erosions. Biopsies obtained. The duodenal folds are normal in D2. No blood seen in the upper GI tract.      Therapies: biopsy of stomach body, antrum  biopsy of duodenal distal bulb    Assessment and Plan:   *) Anemia:  -folate and iron deficiency, started on folic acid and IV iron given last week.  -transfuse for HBG < 7  -Hg 7.3 today  -PRBC given 1/30 and 5/33  - Switched folic acid to IV on 8/43- to ensure she is able to absorb supplement, she was still macrocytic and anemia not responding well to prbc, has some diarrhea issues making me question absorption. -GI following  - Labs repeated 1/31 ;and still not showing any hemolysis or DIC.  -Noted plans to possible move to hospice pending mtg later today    *) Thrombocytopenia-improved:  - DDX: Drug related, infection, No DIC  - HIT ab negative, plt improved. *) Resp distress  -requires bipap at present. Efforts to wean down underway. *) Sepsis  - Weaned off Levophed. . On midodrine    *) Rash:   - likely drug rash?, switched zosyn to nafillin and now on Vancomycin, rash worse today  - Rash appeared 1/30 and received zosyn, IV KCL  and blood that day.   - Rash looks a little less erythematous today  - No fever

## 2023-02-06 NOTE — PROGRESS NOTES
Problem: Pressure Injury - Risk of  Goal: *Prevention of pressure injury  Description: Document Gaston Scale and appropriate interventions in the flowsheet. Outcome: Progressing Towards Goal  Note: Pressure Injury Interventions:  Sensory Interventions: Assess changes in LOC, Keep linens dry and wrinkle-free    Moisture Interventions: Absorbent underpads    Activity Interventions: Assess need for specialty bed    Mobility Interventions: Pressure redistribution bed/mattress (bed type)    Nutrition Interventions: Document food/fluid/supplement intake, Offer support with meals,snacks and hydration    Friction and Shear Interventions: Apply protective barrier, creams and emollients, HOB 30 degrees or less                Problem: Patient Education: Go to Patient Education Activity  Goal: Patient/Family Education  Outcome: Progressing Towards Goal     Problem: Falls - Risk of  Goal: *Absence of Falls  Description: Document Anna Fall Risk and appropriate interventions in the flowsheet.   Outcome: Progressing Towards Goal  Note: Fall Risk Interventions:  Mobility Interventions: OT consult for ADLs, PT Consult for mobility concerns, PT Consult for assist device competence         Medication Interventions: Bed/chair exit alarm, Patient to call before getting OOB    Elimination Interventions: Call light in reach              Problem: Patient Education: Go to Patient Education Activity  Goal: Patient/Family Education  Outcome: Progressing Towards Goal     Problem: Heart Failure: Day 1  Goal: Off Pathway (Use only if patient is Off Pathway)  Outcome: Progressing Towards Goal  Goal: Activity/Safety  Outcome: Progressing Towards Goal  Goal: Consults, if ordered  Outcome: Progressing Towards Goal  Goal: Diagnostic Test/Procedures  Outcome: Progressing Towards Goal  Goal: Nutrition/Diet  Outcome: Progressing Towards Goal  Goal: Discharge Planning  Outcome: Progressing Towards Goal  Goal: Medications  Outcome: Progressing Towards Goal  Goal: Respiratory  Outcome: Progressing Towards Goal  Goal: Treatments/Interventions/Procedures  Outcome: Progressing Towards Goal  Goal: Psychosocial  Outcome: Progressing Towards Goal  Goal: *Oxygen saturation within defined limits  Outcome: Progressing Towards Goal  Goal: *Hemodynamically stable  Outcome: Progressing Towards Goal  Goal: *Optimal pain control at patient's stated goal  Outcome: Progressing Towards Goal  Goal: *Anxiety reduced or absent  Outcome: Progressing Towards Goal     Problem: Heart Failure: Day 2  Goal: Off Pathway (Use only if patient is Off Pathway)  Outcome: Progressing Towards Goal  Goal: Activity/Safety  Outcome: Progressing Towards Goal  Goal: Consults, if ordered  Outcome: Progressing Towards Goal  Goal: Diagnostic Test/Procedures  Outcome: Progressing Towards Goal  Goal: Nutrition/Diet  Outcome: Progressing Towards Goal  Goal: Discharge Planning  Outcome: Progressing Towards Goal  Goal: Medications  Outcome: Progressing Towards Goal  Goal: Respiratory  Outcome: Progressing Towards Goal  Goal: Treatments/Interventions/Procedures  Outcome: Progressing Towards Goal  Goal: Psychosocial  Outcome: Progressing Towards Goal  Goal: *Oxygen saturation within defined limits  Outcome: Progressing Towards Goal  Goal: *Hemodynamically stable  Outcome: Progressing Towards Goal  Goal: *Optimal pain control at patient's stated goal  Outcome: Progressing Towards Goal  Goal: *Anxiety reduced or absent  Outcome: Progressing Towards Goal  Goal: *Demonstrates progressive activity  Outcome: Progressing Towards Goal     Problem: Patient Education: Go to Patient Education Activity  Goal: Patient/Family Education  Outcome: Progressing Towards Goal     Problem: Infection - Risk of, Central Venous Catheter-Associated Bloodstream Infection  Goal: *Absence of infection signs and symptoms  Outcome: Progressing Towards Goal

## 2023-02-06 NOTE — DISCHARGE INSTRUCTIONS
Hospitalist Discharge Instructions      DISCHARGE DIAGNOSIS:  Acute on chronic hypoxic respiratory failure POA  COPD-- end stage/Poor prognosis,   JOSLYN compliant with CPAP  Diastolic heart failure  Patient is currently on 4 L supplemental nasal cannula oxygen. Patient is requiring BiPAP only at nighttime. Patient is back to her baseline oxygen requirements. Outpatient follow-up with pulmonology     Septic shock  MSSA bacteremia  - ID recommended discharge on daptomycin  - Outpatient follow-up with ID    Left femoral neck fracture  - No intervention per ortho  - Outpatient follow up with ortho     Acute on chronic anemia:  GI following, high risk for Endoscopy, colonscopy  Occult stool negative. No signs of active bleeding  GI performed EGD. High risk for colonoscopy. Signed off. Hematology following   Has folate and iron deficiency, started on iv folic acid and iv iron last week  S/p transfusions  S/p EGD 2/1 by GI- found duodenitis/gastritis - recommends to cont PPI BID + carafate  Outpatient follow-up with hematology and GI     Erythematous rash- persistent  Pencillin and cephalosporins     Type II DM:  Continue lispro sliding scale     Paroxysmal afib  CAD:  Continue statin, Metoprolol  Continue xarelto which will help with DVT ppx and A Fib  Aspirin discontinued given duodenitis and gastritis     Severe obesity  BMI 51  Complicates overall care     CONSULTATIONS:  IP CONSULT TO PODIATRY  IP CONSULT TO HEMATOLOGY  IP CONSULT TO PALLIATIVE CARE - PROVIDER  IP CONSULT TO INFECTIOUS DISEASES  IP CONSULT TO INTERVENTIONAL RADIOLOGY  IP CONSULT TO PULMONOLOGY  IP CONSULT TO ORTHOPEDIC SURGERY  IP CONSULT TO CARDIOLOGY  ______________________________________________________________________  DISCHARGE SUMMARY/HOSPITAL COURSE:  for full details see H&P, daily progress notes, labs, consult notes.    _______________________________________________________________________  Patient seen and examined by me on discharge day.  Pertinent Findings:  Gen:    Not in distress  Chest:  Clear lungs  CVS:    Regular rhythm. mild edema  Abd:     Soft, not distended, not tender  Neuro:  Alert  _______________________________________________________________________  DISCHARGE MEDICATIONS:   Current Discharge Medication List              START taking these medications     Details   DAPTOmycin (CUBICIN) IVPB 1,000 mg by IntraVENous route every twenty-four (24) hours for 32 days. Qty: 32 Dose, Refills: 0  Start date: 2/6/2023, End date: 3/10/2023       sucralfate (CARAFATE) 1 gram tablet Take 1 Tablet by mouth Before breakfast, lunch, dinner and at bedtime for 90 days. Qty: 120 Tablet, Refills: 2  Start date: 2/6/2023, End date: 5/7/2023       midodrine (PROAMATINE) 10 mg tablet Take 1 Tablet by mouth three (3) times daily (with meals) for 30 days. Qty: 90 Tablet, Refills: 0  Start date: 2/6/2023, End date: 3/8/2023                  CONTINUE these medications which have CHANGED     Details   pantoprazole (PROTONIX) 40 mg tablet Take 1 Tablet by mouth Before breakfast and dinner for 90 days. Qty: 60 Tablet, Refills: 2  Start date: 2/6/2023, End date: 5/7/2023                  CONTINUE these medications which have NOT CHANGED     Details   rivaroxaban (Xarelto) 20 mg tab tablet Take  by mouth daily. amiodarone (CORDARONE) 200 mg tablet Take  by mouth. furosemide (LASIX) 20 mg tablet Take  by mouth daily. metoprolol (LOPRESSOR) 25 mg tablet Take 25 mg by mouth two (2) times a day. clotrimazole-betamethasone (LOTRISONE) topical cream Apply at affected area  Qty: 15 g, Refills: 0       albuterol-ipratropium (DUO-NEB) 2.5 mg-0.5 mg/3 ml nebu 3 mL by Nebulization route every four (4) hours as needed for Wheezing, Shortness of Breath or Respiratory Distress. Qty: 100 Nebule, Refills: 0       acetaminophen (TYLENOL) 325 mg tablet Take 650 mg by mouth every six (6) hours as needed for Pain.                    STOP taking these medications         atorvastatin (LIPITOR) 10 mg tablet Comments:   Reason for Stopping:                       Patient Follow Up Instructions: Activity: Activity as tolerated  Diet: Diabetic Diet  Wound Care: None needed     Follow-up with PCP, Podiatry, ID, Pulmonology, Hematology, GI in 3 weeks. Follow-up tests/labs weekly CPK, CBC, CMP, CK.  ESR/CRP     Follow-up Information         Follow up With Specialties Details Why Contact Info     Unknown, Provider       Patient not available to ask        Chucky Irvin DPM Podiatry, Orthopedic Surgery Follow up in 2 week(s)   [address] [facility]  [phone]        Hope Motta MD Infectious Disease Physician, Internal Medicine Physician Follow up in 2 week(s)   [address] [facility] [address] [phone]        Ruba Villatoro MD Internal Medicine Physician, Pulmonary Disease Follow up in 2 week(s)   [address] [facility] [address] [facility]        Wyvonna Siemens, MD Hematology and Oncology, Hematology Physician, Oncology Follow up in 4 week(s)   [address] [facility]  [address]  [address] [place] Ashok Gamble MD Gastroenterology Follow up in 3 week(s)   [address] [facility]  [address] [facility] [address] [phone]

## 2023-02-06 NOTE — PROGRESS NOTES
Hospitalist Progress Note    NAME: Kajal Geronimo   :  1960   MRN:  [id number]       Assessment / Plan:    Acute on chronic hypoxic respiratory failure POA  COPD-- end stage/Poor prognosis,   JOSLYN compliant with CPAP  Diastolic heart failure  Patient was on 4-6 L supplemental nasal cannula oxygen at home and BiPAP at night time. Patient oxygen requirements have increased. Went up to FiO2 50 % on BiPAP. Saturating 85% on 6 L NC oxygen. Will attribute this to anxiety more than anything else. Patient did not opt for hospice. Will re-consult pulmonology for any further recommendations. Septic shock  MSSA bacteremia  - ID recommended discharge on daptomycin  - Outpatient follow-up with ID    Left femoral neck fracture  - No intervention per ortho  - Outpatient follow up with ortho     Acute on chronic anemia:  GI following, high risk for Endoscopy, colonscopy  Occult stool negative. No signs of active bleeding  GI performed EGD. High risk for colonoscopy. Signed off. Hematology following   Has folate and iron deficiency, started on iv folic acid and iv iron last week  S/p transfusions  S/p EGD  by GI- found duodenitis/gastritis - recommends to cont PPI BID + carafate  Outpatient follow-up with hematology and GI     Erythematous rash- persistent  Pencillin and cephalosporins     Type II DM:  Continue lispro sliding scale     Paroxysmal afib  CAD:  Continue statin, Metoprolol  Continue xarelto on discharge which will help with DVT ppx and A Fib  Aspirin discontinued given duodenitis and gastritis     Severe obesity  BMI 51  Complicates overall care    Acute kindey Injury:  - Will start IVF    40 or above Morbid obesity / Body mass index is 43.22 kg/m². Estimated discharge date: 23  Barriers: Increasing oxygen requirements    Code status: DNR  Prophylaxis: Lovenox  Recommended Disposition: Home w/Family     Subjective:     Patient oxygen requirements have increased.  Went up to FiO2 50 % on BiPAP. Saturating 85% on 6 L NC oxygen. Will attribute this to anxiety more than anything else. Patient did not opt for hospice. Will re-consult pulmonology for any further recommendations. Elevated Cr. Will start IVF    Objective:     VITALS:   Last 24hrs VS reviewed since prior progress note. Most recent are:  Patient Vitals for the past 24 hrs:   Temp Pulse Resp BP SpO2   02/06/23 1417 -- -- -- -- (!) 85 %   02/06/23 1330 -- 81 (!) 37 -- 91 %   02/06/23 1247 100.4 °F (38 °C) 80 (!) 31 (!) 110/51 (!) 89 %   02/06/23 1121 -- -- -- -- 90 %   02/06/23 0800 -- 75 -- -- (!) 87 %   02/06/23 0758 -- 77 -- -- (!) 89 %   02/06/23 0756 -- 73 -- -- (!) 88 %   02/06/23 0754 -- 74 -- -- 92 %   02/06/23 0752 -- 73 -- -- (!) 85 %   02/06/23 0747 -- 74 -- -- (!) 83 %   02/06/23 0740 -- 75 -- -- (!) 86 %   02/06/23 0739 99 °F (37.2 °C) 75 25 (!) 110/46 (!) 86 %   02/06/23 0509 -- 69 18 (!) 93/31 90 %   02/06/23 0457 98.3 °F (36.8 °C) 72 18 (!) 191/149 (!) 85 %   02/06/23 0254 -- -- -- -- 90 %   02/06/23 0240 -- -- -- -- (!) 87 %   02/05/23 2300 98.7 °F (37.1 °C) 69 18 (!) 102/40 96 %   02/05/23 2237 -- -- -- -- 94 %   02/05/23 2040 99.3 °F (37.4 °C) 70 (!) 36 (!) 116/43 93 %   02/05/23 2023 -- -- -- -- (!) 85 %   02/05/23 1708 -- 69 -- (!) 100/40 --   02/05/23 1519 98.8 °F (37.1 °C) 68 27 (!) 103/43 94 %       Intake/Output Summary (Last 24 hours) at 2/6/2023 1445  Last data filed at 2/6/2023 0556  Gross per 24 hour   Intake --   Output 500 ml   Net -500 ml        I had a face to face encounter and independently examined this patient on 2/6/2023, as outlined below:  PHYSICAL EXAM:  General:          Alert, cooperative, no acute distress    EENT:              EOMI. Anicteric sclerae. Resp:               CTA bilaterally, no wheezing or rales. No accessory muscle use  CV:                  Regular  rhythm,  No edema  GI:                   Soft, Non distended, Non tender.   Neurologic:       Alert and oriented X 3, normal speech  Psych:   Good insight. Not anxious nor agitated  Skin:                Erythematous rash all over the body. No jaundice    Reviewed most current lab test results and cultures  YES  Reviewed most current radiology test results   YES  Review and summation of old records today    NO  Reviewed patient's current orders and MAR    YES  PMH/SH reviewed - no change compared to H&P  ________________________________________________________________________  Care Plan discussed with:    Comments   Patient [address] Y    Consultant                       Y Multidiciplinary team rounds were held today with , nursing, pharmacist and clinical coordinator. Patient's plan of care was discussed; medications were reviewed and discharge planning was addressed. I spent a total of 50 minutes on the day of the visit. This includes face to face time and non-face to face time preparing to see the patient such as review of tests, obtaining and/or reviewing separately obtained history, documenting clinical information in the electronic or other health record, independently interpreting results and communication results to the patient/family/caregiver, or care coordinator. Janice Suresh MD     Procedures: see electronic medical records for all procedures/Xrays and details which were not copied into this note but were reviewed prior to creation of Plan. LABS:  I reviewed today's most current labs and imaging studies.   Pertinent labs include:  Recent Labs     02/05/23  0240 02/04/23  0534   WBC  --  16.1*   HGB 7.9* 7.7*   HCT 28.5* 26.6*   PLT  --  174     Recent Labs     02/06/23  0454 02/05/23 0240 02/04/23  0534   * 135* 135*   K 4.4 3.9 3.8   CL 93* 96* 97   CO2 37* 38* 37*   GLU 96 107* 123*   BUN 10 8 7   CREA 1.08* 0.86 0.89   CA 8.3* 8.1* 8.3*       Signed: Janice Suresh MD

## 2023-02-06 NOTE — PROGRESS NOTES
ADULT PROTOCOL: JET AEROSOL ASSESSMENT    Patient  Jules Zayas     60 y.o.   female     2/6/2023  6:25 PM    Breath Sounds Pre Procedure: Right Breath Sounds: Diminished                               Left Breath Sounds: Diminished    Breath Sounds Post Procedure: Right Breath Sounds: Diminished                                 Left Breath Sounds: Diminished    Breathing pattern: Pre procedure Breathing Pattern: Tachypneic          Post procedure Breathing Pattern: Tachypneic    Heart Rate: Pre procedure Pulse: 81           Post procedure Pulse: 75    Resp Rate: Pre procedure Respirations: 31           Post procedure Respirations: 30    Cough: Pre procedure Cough: Non-productive               Post procedure      Oxygen: O2 Device: BIPAP   O2 Flow Rate (L/min): 6 l/min         SpO2: Pre procedure SpO2: 91 %   with oxygen              Post procedure SpO2: 90 %  with oxygen    Nebulizer Therapy: Current medications Aerosolized Medications: DuoNeb      Changed: NO    Smoking History:   Social History     Tobacco Use   Smoking Status Former   Smokeless Tobacco Never       Problem List:   Patient Active Problem List   Diagnosis Code    COVID-19 ruled out Z20.822    [facility]) I48.91    S/P cardiac cath Z98.890    Mixed hyperlipidemia E78.2    Morbid obesity (HCC) E66.01    Acute respiratory failure ([facility]) J96.00    Atrial flutter with rapid ventricular response ([facility]) I48.92    JOSLYN (obstructive sleep apnea) G47.33    Acute on chronic respiratory failure with hypoxia and hypercapnia ([facility]) J96.21, J96.22    SOB (shortness of breath) R06.02    Physical deconditioning R53.81    Counseling regarding advance care planning and goals of care Z71.89    Chronic pain G89.29    Dependence on supplemental oxygen Z99.81    Essential hypertension I10    Heart failure ([facility]) I50.9    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

## 2023-02-06 NOTE — DISCHARGE SUMMARY
Hospitalist Discharge Summary     Patient ID:  lEda Luna  [id number]  60 y.o.  1960 1/21/2023    PCP on record: Unknown, Provider    Admit date: 1/21/2023  Discharge date and time: 2/6/2023    DISCHARGE DIAGNOSIS:  Acute on chronic hypoxic respiratory failure POA  COPD-- end stage/Poor prognosis,   JOSLYN compliant with CPAP  Diastolic heart failure  Patient is currently on 4 L supplemental nasal cannula oxygen. Patient is requiring BiPAP only at nighttime. Patient is back to her baseline oxygen requirements. Outpatient follow-up with pulmonology     Septic shock  MSSA bacteremia  - ID recommended discharge on daptomycin  - Outpatient follow-up with ID    Left femoral neck fracture  - No intervention per ortho  - Outpatient follow up with ortho    Acute on chronic anemia:  GI following, high risk for Endoscopy, colonscopy  Occult stool negative. No signs of active bleeding  GI performed EGD. High risk for colonoscopy. Signed off.   Hematology following   Has folate and iron deficiency, started on iv folic acid and iv iron last week  S/p transfusions  S/p EGD 2/1 by GI- found duodenitis/gastritis - recommends to cont PPI BID + carafate  Outpatient follow-up with hematology and GI     Erythematous rash- persistent  Pencillin and cephalosporins     Type II DM:  Continue lispro sliding scale     Paroxysmal afib  CAD:  Continue statin, Metoprolol  Continue xarelto which will help with DVT ppx and A Fib  Aspirin discontinued given duodenitis and gastritis     Severe obesity  BMI 51  Complicates overall care    CONSULTATIONS:  IP CONSULT TO PODIATRY  IP CONSULT TO HEMATOLOGY  IP CONSULT TO PALLIATIVE CARE - PROVIDER  IP CONSULT TO INFECTIOUS DISEASES  IP CONSULT TO INTERVENTIONAL RADIOLOGY  IP CONSULT TO PULMONOLOGY  IP CONSULT TO ORTHOPEDIC SURGERY  IP CONSULT TO CARDIOLOGY    Excerpted HPI from H&P of Sage Panda DO:  Isela Tejada is a 60 y.o.  female with pertinent past medical history of chronic hypoxemic respiratory failure on 4 L/min home oxygen therapy secondary to COPD, diabetes mellitus, essential hypertension, hyperlipidemia, JOSLYN compliant with CPAP, history duodenal ulcers, chronic anemia of indeterminate etiology who presents after routine lab work performed yesterday demonstrated hemoglobin of 5.3. Patient denies any sources of blood loss including hematochezia, melena, hematuria, hematemesis, epistaxis, bruising/bleeding. She reports she has had lower than normal blood pressures at home with systolics in the 02T when her baseline is in the 130s, but denies any other associated symptoms. Medication reconciliation does not list any anticoagulation, but patient reports she is taking a blood thinner-unsure which one. ROS otherwise negative. Patient denies tobacco, alcohol, or illicit drug use. In the ED, patient afebrile and hemodynamically stable saturating upper 90s on baseline 4 L/min home oxygen therapy. Labs demonstrate: WBC 9.3, hemoglobin 5.3, platelets 993, sodium 140, potassium 4.4, chloride 95, bicarb 42 (chronic), glucose 152, BUN 17, creatinine 0.89, lactic acid 3.86 improved to 2.4 with IV fluids and 1 unit packed red blood cells. Fecal occult blood negative. UA not reflexed to culture. CXR demonstrates diffuse hazy interstitial opacities suggestive of edema with bandlike right upper lobe atelectasis. We were asked to admit for work up and evaluation of the above problems. ______________________________________________________________________  DISCHARGE SUMMARY/HOSPITAL COURSE:  for full details see H&P, daily progress notes, labs, consult notes. _______________________________________________________________________  Patient seen and examined by me on discharge day. Pertinent Findings:  Gen:    Not in distress  Chest: Clear lungs  CVS:   Regular rhythm.   No edema  Abd:  Soft, not distended, not tender  Neuro: Alert  _______________________________________________________________________  DISCHARGE MEDICATIONS:   Current Discharge Medication List        START taking these medications    Details   DAPTOmycin (CUBICIN) IVPB 1,000 mg by IntraVENous route every twenty-four (24) hours for 32 days. Qty: 32 Dose, Refills: 0  Start date: 2/6/2023, End date: 3/10/2023      sucralfate (CARAFATE) 1 gram tablet Take 1 Tablet by mouth Before breakfast, lunch, dinner and at bedtime for 90 days. Qty: 120 Tablet, Refills: 2  Start date: 2/6/2023, End date: 5/7/2023      midodrine (PROAMATINE) 10 mg tablet Take 1 Tablet by mouth three (3) times daily (with meals) for 30 days. Qty: 90 Tablet, Refills: 0  Start date: 2/6/2023, End date: 3/8/2023           CONTINUE these medications which have CHANGED    Details   pantoprazole (PROTONIX) 40 mg tablet Take 1 Tablet by mouth Before breakfast and dinner for 90 days. Qty: 60 Tablet, Refills: 2  Start date: 2/6/2023, End date: 5/7/2023           CONTINUE these medications which have NOT CHANGED    Details   rivaroxaban (Xarelto) 20 mg tab tablet Take  by mouth daily. amiodarone (CORDARONE) 200 mg tablet Take  by mouth. furosemide (LASIX) 20 mg tablet Take  by mouth daily. metoprolol (LOPRESSOR) 25 mg tablet Take 25 mg by mouth two (2) times a day. clotrimazole-betamethasone (LOTRISONE) topical cream Apply at affected area  Qty: 15 g, Refills: 0      albuterol-ipratropium (DUO-NEB) 2.5 mg-0.5 mg/3 ml nebu 3 mL by Nebulization route every four (4) hours as needed for Wheezing, Shortness of Breath or Respiratory Distress. Qty: 100 Nebule, Refills: 0      acetaminophen (TYLENOL) 325 mg tablet Take 650 mg by mouth every six (6) hours as needed for Pain. STOP taking these medications       atorvastatin (LIPITOR) 10 mg tablet Comments:   Reason for Stopping:                 Patient Follow Up Instructions:    Activity: Activity as tolerated  Diet: Diabetic Diet  Wound Care: None needed    Follow-up with PCP, Podiatry, ID, Pulmonology, Hematology, GI in 3 weeks. Follow-up tests/labs weekly CPK, CBC, CMP, CK.  ESR/CRP    Follow-up Information       Follow up With Specialties Details Why Contact Info    Unknown, Provider    Patient not available to ask      Jenifer Ball DPM Podiatry, Orthopedic Surgery Follow up in 2 week(s)  [address]  [phone]      Ema Lorenz MD Infectious Disease Physician, Internal Medicine Physician Follow up in 2 week(s)  [address]  [address] [phone]      Eren Mason MD Internal Medicine Physician, Pulmonary Disease Follow up in 2 week(s)  [address] [facility] [address]  [address] [facility]      Wendie Palumbo MD Hematology and Oncology, Hematology Physician, Oncology Follow up in 4 week(s)  [address]  [address]  [address] [place] Ashok Cyr MD Gastroenterology Follow up in 3 week(s)  [address]  [phone]            ________________________________________________________________    Risk of deterioration: Low    Condition at Discharge:  Stable  __________________________________________________________________    Disposition  Home with family, no needs    ____________________________________________________________________    Code Status: Full Code  ___________________________________________________________________      Total time in minutes spent coordinating this discharge (includes going over instructions, follow-up, prescriptions, and preparing report for sign off to her PCP) :  35 minutes    Signed:  Corey Alvarez MD

## 2023-02-07 ENCOUNTER — APPOINTMENT (OUTPATIENT)
Dept: GENERAL RADIOLOGY | Age: 63
End: 2023-02-07
Attending: INTERNAL MEDICINE
Payer: MEDICARE

## 2023-02-07 LAB
ALBUMIN SERPL-MCNC: 1.8 G/DL (ref 3.5–5)
ALBUMIN/GLOB SERPL: 0.4 (ref 1.1–2.2)
ALP SERPL-CCNC: 88 U/L (ref 45–117)
ALT SERPL-CCNC: 24 U/L (ref 12–78)
ANION GAP SERPL CALC-SCNC: 3 MMOL/L (ref 5–15)
AST SERPL-CCNC: 41 U/L (ref 15–37)
BILIRUB SERPL-MCNC: 1.3 MG/DL (ref 0.2–1)
BUN SERPL-MCNC: 13 MG/DL (ref 6–20)
BUN/CREAT SERPL: 11 (ref 12–20)
CALCIUM SERPL-MCNC: 8.1 MG/DL (ref 8.5–10.1)
CHLORIDE SERPL-SCNC: 95 MMOL/L (ref 97–108)
CO2 SERPL-SCNC: 36 MMOL/L (ref 21–32)
CREAT SERPL-MCNC: 1.17 MG/DL (ref 0.55–1.02)
ERYTHROCYTE [DISTWIDTH] IN BLOOD BY AUTOMATED COUNT: 27.5 % (ref 11.5–14.5)
GLOBULIN SER CALC-MCNC: 4.6 G/DL (ref 2–4)
GLUCOSE BLD STRIP.AUTO-MCNC: 105 MG/DL (ref 65–117)
GLUCOSE BLD STRIP.AUTO-MCNC: 106 MG/DL (ref 65–117)
GLUCOSE BLD STRIP.AUTO-MCNC: 109 MG/DL (ref 65–117)
GLUCOSE BLD STRIP.AUTO-MCNC: 121 MG/DL (ref 65–117)
GLUCOSE SERPL-MCNC: 96 MG/DL (ref 65–100)
HCT VFR BLD AUTO: 23.8 % (ref 35–47)
HGB BLD-MCNC: 6.9 G/DL (ref 11.5–16)
HISTORY CHECKED?,CKHIST: NORMAL
MCH RBC QN AUTO: 30.1 PG (ref 26–34)
MCHC RBC AUTO-ENTMCNC: 29 G/DL (ref 30–36.5)
MCV RBC AUTO: 103.9 FL (ref 80–99)
NRBC # BLD: 0.41 K/UL (ref 0–0.01)
NRBC BLD-RTO: 2 PER 100 WBC
PLATELET # BLD AUTO: 165 K/UL (ref 150–400)
PMV BLD AUTO: 11.7 FL (ref 8.9–12.9)
POTASSIUM SERPL-SCNC: 4.1 MMOL/L (ref 3.5–5.1)
PROT SERPL-MCNC: 6.4 G/DL (ref 6.4–8.2)
RBC # BLD AUTO: 2.29 M/UL (ref 3.8–5.2)
SERVICE CMNT-IMP: ABNORMAL
SERVICE CMNT-IMP: NORMAL
SODIUM SERPL-SCNC: 134 MMOL/L (ref 136–145)
WBC # BLD AUTO: 20.7 K/UL (ref 3.6–11)

## 2023-02-07 PROCEDURE — 36415 COLL VENOUS BLD VENIPUNCTURE: CPT

## 2023-02-07 PROCEDURE — 74011000258 HC RX REV CODE- 258: Performed by: INTERNAL MEDICINE

## 2023-02-07 PROCEDURE — 74011250636 HC RX REV CODE- 250/636: Performed by: INTERNAL MEDICINE

## 2023-02-07 PROCEDURE — 74011250637 HC RX REV CODE- 250/637: Performed by: STUDENT IN AN ORGANIZED HEALTH CARE EDUCATION/TRAINING PROGRAM

## 2023-02-07 PROCEDURE — 77010033678 HC OXYGEN DAILY

## 2023-02-07 PROCEDURE — 94640 AIRWAY INHALATION TREATMENT: CPT

## 2023-02-07 PROCEDURE — 74011250636 HC RX REV CODE- 250/636: Performed by: STUDENT IN AN ORGANIZED HEALTH CARE EDUCATION/TRAINING PROGRAM

## 2023-02-07 PROCEDURE — P9016 RBC LEUKOCYTES REDUCED: HCPCS

## 2023-02-07 PROCEDURE — 74011250637 HC RX REV CODE- 250/637: Performed by: INTERNAL MEDICINE

## 2023-02-07 PROCEDURE — 74011000250 HC RX REV CODE- 250: Performed by: INTERNAL MEDICINE

## 2023-02-07 PROCEDURE — 80053 COMPREHEN METABOLIC PANEL: CPT

## 2023-02-07 PROCEDURE — 85027 COMPLETE CBC AUTOMATED: CPT

## 2023-02-07 PROCEDURE — 36430 TRANSFUSION BLD/BLD COMPNT: CPT

## 2023-02-07 PROCEDURE — 74011000250 HC RX REV CODE- 250: Performed by: STUDENT IN AN ORGANIZED HEALTH CARE EDUCATION/TRAINING PROGRAM

## 2023-02-07 PROCEDURE — 82962 GLUCOSE BLOOD TEST: CPT

## 2023-02-07 PROCEDURE — 65270000046 HC RM TELEMETRY

## 2023-02-07 PROCEDURE — 86900 BLOOD TYPING SEROLOGIC ABO: CPT

## 2023-02-07 PROCEDURE — 74011000250 HC RX REV CODE- 250: Performed by: PHYSICIAN ASSISTANT

## 2023-02-07 PROCEDURE — 74011000258 HC RX REV CODE- 258: Performed by: STUDENT IN AN ORGANIZED HEALTH CARE EDUCATION/TRAINING PROGRAM

## 2023-02-07 PROCEDURE — 94660 CPAP INITIATION&MGMT: CPT

## 2023-02-07 PROCEDURE — 71045 X-RAY EXAM CHEST 1 VIEW: CPT

## 2023-02-07 RX ORDER — DIPHENHYDRAMINE HCL 25 MG
25 CAPSULE ORAL
Status: DISCONTINUED | OUTPATIENT
Start: 2023-02-07 | End: 2023-02-10 | Stop reason: HOSPADM

## 2023-02-07 RX ORDER — ACETAMINOPHEN 325 MG/1
650 TABLET ORAL
Status: DISCONTINUED | OUTPATIENT
Start: 2023-02-07 | End: 2023-02-10 | Stop reason: HOSPADM

## 2023-02-07 RX ORDER — SODIUM CHLORIDE 9 MG/ML
250 INJECTION, SOLUTION INTRAVENOUS AS NEEDED
Status: DISCONTINUED | OUTPATIENT
Start: 2023-02-07 | End: 2023-02-10 | Stop reason: HOSPADM

## 2023-02-07 RX ADMIN — SODIUM CHLORIDE, PRESERVATIVE FREE 10 ML: 5 INJECTION INTRAVENOUS at 05:17

## 2023-02-07 RX ADMIN — METOPROLOL TARTRATE 12.5 MG: 25 TABLET, FILM COATED ORAL at 11:42

## 2023-02-07 RX ADMIN — MIDODRINE HYDROCHLORIDE 10 MG: 5 TABLET ORAL at 11:42

## 2023-02-07 RX ADMIN — MIDODRINE HYDROCHLORIDE 10 MG: 5 TABLET ORAL at 15:51

## 2023-02-07 RX ADMIN — CASTOR OIL AND BALSAM, PERU: 788; 87 OINTMENT TOPICAL at 20:51

## 2023-02-07 RX ADMIN — IPRATROPIUM BROMIDE AND ALBUTEROL SULFATE 3 ML: 2.5; .5 SOLUTION RESPIRATORY (INHALATION) at 14:12

## 2023-02-07 RX ADMIN — Medication 1 CAPSULE: at 11:42

## 2023-02-07 RX ADMIN — CASTOR OIL AND BALSAM, PERU: 788; 87 OINTMENT TOPICAL at 11:45

## 2023-02-07 RX ADMIN — SODIUM CHLORIDE 100 ML/HR: 9 INJECTION, SOLUTION INTRAVENOUS at 15:53

## 2023-02-07 RX ADMIN — SODIUM CHLORIDE 100 ML/HR: 9 INJECTION, SOLUTION INTRAVENOUS at 05:17

## 2023-02-07 RX ADMIN — ENOXAPARIN SODIUM 30 MG: 100 INJECTION SUBCUTANEOUS at 11:45

## 2023-02-07 RX ADMIN — FUROSEMIDE 40 MG: 40 TABLET ORAL at 11:42

## 2023-02-07 RX ADMIN — SODIUM CHLORIDE, PRESERVATIVE FREE 10 ML: 5 INJECTION INTRAVENOUS at 22:02

## 2023-02-07 RX ADMIN — IPRATROPIUM BROMIDE AND ALBUTEROL SULFATE 3 ML: 2.5; .5 SOLUTION RESPIRATORY (INHALATION) at 20:43

## 2023-02-07 RX ADMIN — PANTOPRAZOLE SODIUM 40 MG: 40 TABLET, DELAYED RELEASE ORAL at 15:51

## 2023-02-07 RX ADMIN — IPRATROPIUM BROMIDE AND ALBUTEROL SULFATE 3 ML: 2.5; .5 SOLUTION RESPIRATORY (INHALATION) at 07:17

## 2023-02-07 RX ADMIN — CAMPHOR, MENTHOL: .5; .5 LOTION TOPICAL at 11:44

## 2023-02-07 RX ADMIN — SODIUM CHLORIDE, PRESERVATIVE FREE 10 ML: 5 INJECTION INTRAVENOUS at 15:52

## 2023-02-07 RX ADMIN — SUCRALFATE 1 G: 1 TABLET ORAL at 11:42

## 2023-02-07 RX ADMIN — SUCRALFATE 1 G: 1 TABLET ORAL at 22:02

## 2023-02-07 RX ADMIN — METOPROLOL TARTRATE 12.5 MG: 25 TABLET, FILM COATED ORAL at 18:25

## 2023-02-07 RX ADMIN — MIDODRINE HYDROCHLORIDE 10 MG: 5 TABLET ORAL at 18:25

## 2023-02-07 RX ADMIN — PANTOPRAZOLE SODIUM 40 MG: 40 TABLET, DELAYED RELEASE ORAL at 11:42

## 2023-02-07 RX ADMIN — NYSTATIN: 100000 POWDER TOPICAL at 11:52

## 2023-02-07 RX ADMIN — CAMPHOR, MENTHOL: .5; .5 LOTION TOPICAL at 20:51

## 2023-02-07 RX ADMIN — ENOXAPARIN SODIUM 30 MG: 100 INJECTION SUBCUTANEOUS at 22:02

## 2023-02-07 RX ADMIN — Medication 1 AMPULE: at 20:54

## 2023-02-07 RX ADMIN — FOLIC ACID: 5 INJECTION, SOLUTION INTRAMUSCULAR; INTRAVENOUS; SUBCUTANEOUS at 15:51

## 2023-02-07 RX ADMIN — POTASSIUM BICARBONATE 20 MEQ: 391 TABLET, EFFERVESCENT ORAL at 11:41

## 2023-02-07 RX ADMIN — SUCRALFATE 1 G: 1 TABLET ORAL at 15:51

## 2023-02-07 RX ADMIN — IPRATROPIUM BROMIDE AND ALBUTEROL SULFATE 3 ML: 2.5; .5 SOLUTION RESPIRATORY (INHALATION) at 02:10

## 2023-02-07 RX ADMIN — NYSTATIN: 100000 POWDER TOPICAL at 18:27

## 2023-02-07 RX ADMIN — DAPTOMYCIN 1000 MG: 500 INJECTION, POWDER, LYOPHILIZED, FOR SOLUTION INTRAVENOUS at 20:45

## 2023-02-07 NOTE — PROGRESS NOTES
Problem: Pressure Injury - Risk of  Goal: *Prevention of pressure injury  Description: Document Gaston Scale and appropriate interventions in the flowsheet. Outcome: Progressing Towards Goal  Note: Pressure Injury Interventions:  Sensory Interventions: Assess changes in LOC, Keep linens dry and wrinkle-free, Minimize linen layers    Moisture Interventions: Minimize layers    Activity Interventions: Assess need for specialty bed, PT/OT evaluation, Increase time out of bed    Mobility Interventions: Assess need for specialty bed, Float heels, PT/OT evaluation    Nutrition Interventions: Document food/fluid/supplement intake, Discuss nutritional consult with provider, Offer support with meals,snacks and hydration    Friction and Shear Interventions: Apply protective barrier, creams and emollients, Minimize layers                Problem: Falls - Risk of  Goal: *Absence of Falls  Description: Document Anna Fall Risk and appropriate interventions in the flowsheet.   Outcome: Progressing Towards Goal  Note: Fall Risk Interventions:  Mobility Interventions: Bed/chair exit alarm, Patient to call before getting OOB, Strengthening exercises (ROM-active/passive)         Medication Interventions: Bed/chair exit alarm, Patient to call before getting OOB, Teach patient to arise slowly    Elimination Interventions: Bed/chair exit alarm, Call light in reach, Patient to call for help with toileting needs              Problem: Heart Failure: Day 1  Goal: Off Pathway (Use only if patient is Off Pathway)  Outcome: Progressing Towards Goal

## 2023-02-07 NOTE — CONSULTS
Pulmonary, Critical Care, and Sleep Medicine      Name: Jah Cunha MRN: [id number]   : 1960 Hospital: [facility]   Date: 2023  Admission date: 2023 Hospital Day:        History:     23: Medical records and data reviewed. Pt no longer able to go to physician offices. Followed by home visiting physicians? Had Trilogy machine at home. Likes CPAP machine here better. Found to be eating when fully supine. Pt strongly advised to sit up for meals. High risk of aspiration. 23: We were asked to re evaluate the pt for worsening hypoxia. When seen she was on the bipap with fio2 of 40% . She was only able to give limited hx. She felt like her breathing was not comfortable. NO sputum production. No other overt changes. Discussed with her bedside nurse Tim Stinson. WE were asked to reconsult on the pt.     23: PT has been about the same. ON NIV when seen this am.   Discussed with nurse this am. Pt denies chest pain. States she does not feel well but not able to get much more details. IMPRESSION:   Acute chronic respiratory failure with Hypoxia and Hypercapnea; home O2, NIV, had worsening hypoxia today. She was on NIV when seen, Fio2 at 40%. Pox was 88-90%. Can be placed on High flow NC. Right basilar atelectasis. Possible issues with Volume overload and fluid retention in the context of morbid obesity. Has been with anasarca. Asthma (vs COPD) not acutely exacerbated; normal eosinophils, she is pretty clear anteriorly when seen this pm. .   Former smoker  Suspected JOSLYN and /or OHS  (COPD OHS Thoracic Restrictive disease ) Restrictive disease from obesity J84.4 and E 66.8  Afib, refractory  DM   Non-ambulatory at baseline due to arthritis, Obesity, SOB ; bedbound; wear adult diapers  Discussed with nurse and RT. Morbid Obesity Body mass index is 43.22 kg/m².       RECOMMENDATIONS/PLAN:   Continue noninvasive ventilation, Will adjust fio2 to keep pox over 90%. If off the bipap, will place on midflow. Agree with diuresis  Repeat labs in am.   Follow up with home visiting physician group. Will not be able to recertify O2 or NIV since she no longer comes to our office. Aspiration precautions  bronchial hygiene with respiratory therapy techniques, bronchodilators  DVT prophylaxis  Prescription drug management with home med reconciliation reviewed          [x] High complexity decision making was performed  [x] See my orders for details      Subjective/Initial History:     I was asked by Sage Panda DO to see Elda Luna  a 60 y.o.  female in consultation for a chief complaint of respiratory failure    Excerpts from admission 1/21/2023 or consult notes as follows:     \"   Isela Tejada is a 60 y.o.  female with pertinent past medical history of chronic hypoxemic respiratory failure on 4 L/min home oxygen therapy secondary to COPD, diabetes mellitus, essential hypertension, hyperlipidemia, JOSLYN compliant with CPAP, history duodenal ulcers, chronic anemia of indeterminate etiology who presents after routine lab work performed yesterday demonstrated hemoglobin of 5.3. Patient denies any sources of blood loss including hematochezia, melena, hematuria, hematemesis, epistaxis, bruising/bleeding. She reports she has had lower than normal blood pressures at home with systolics in the 65S when her baseline is in the 130s, but denies any other associated symptoms. Medication reconciliation does not list any anticoagulation, but patient reports she is taking a blood thinner-unsure which one. ROS otherwise negative. Patient denies tobacco, alcohol, or illicit drug use. In the ED, patient afebrile and hemodynamically stable saturating upper 90s on baseline 4 L/min home oxygen therapy.   Labs demonstrate: WBC 9.3, hemoglobin 5.3, platelets 721, sodium 140, potassium 4.4, chloride 95, bicarb 42 (chronic), glucose 152, BUN 17, creatinine 0.89, lactic acid 3.86 improved to 2.4 with IV fluids and 1 unit packed red blood cells. Fecal occult blood negative. UA not reflexed to culture. CXR demonstrates diffuse hazy interstitial opacities suggestive of edema with bandlike right upper lobe atelectasis. \"    Patient poor historian. Patient sent home April 2022 with noninvasive ventilation. Patient thinks she is on CPAP. Now on BiPAP. She has never made it back to the office for clinic follow-up. [employer] is [employer]. She does not recall having had a sleep study. At 1 point she weighed 310 pounds. She states she lost 50 pounds. Weight on admission 282 pounds. BMI 50 kg/m². Patient says she is bedbound. Daughter helps her at home. Daughter has back problems. Patient without any stomach pain. She is thirsty all the time. She has no teeth. Last saw Dr. Marlee Contreras in the office December 2020. Echocardiogram April 2022 left ejection fraction 50 to 65%     PAST MEDICAL HISTORY:  Significant for AFib, CHF, diabetes, hypertension, obstructive sleep apnea, COPD    Allergies   Allergen Reactions    Cefepime Rash     tolerated nafcillin 1/2023    Nafcillin Rash    Cefazolin Rash    Other Plant, Animal, Environmental Rash     Ivory soap caused rash on hands.     Zosyn [Piperacillin-Tazobactam] Rash     Possible rash to zosyn, also received IV contrast and blood  Tolerated nafcillin before/after 1/2023        MAR reviewed and pertinent medications noted or modified as needed       Current Facility-Administered Medications   Medication    DAPTOmycin (CUBICIN) 1,000 mg in 0.9% sodium chloride 50 mL IVPB    0.9% sodium chloride infusion    nystatin (MYCOSTATIN) 100,000 unit/gram powder    sucralfate (CARAFATE) tablet 1 g    camphor-menthoL (SARNA) 0.5-0.5 % lotion    albuterol-ipratropium (DUO-NEB) 2.5 MG-0.5 MG/3 ML    L.acidophilus-paracasei-S.thermophil-bifidobacter (RISAQUAD) 8 billion cell capsule    oxyCODONE-acetaminophen (PERCOCET) 5-325 mg per tablet 1 Tablet    potassium bicarb-citric acid (EFFER-K) tablet 20 mEq    folic acid (FOLVITE) 1 mg in 0.9% sodium chloride 50 mL ivpb    furosemide (LASIX) tablet 40 mg    midodrine (PROAMATINE) tablet 10 mg    enoxaparin (LOVENOX) injection 30 mg    alcohol 62% (NOZIN) nasal  1 Ampule    insulin lispro (HUMALOG) injection    glucose chewable tablet 16 g    glucagon (GLUCAGEN) injection 1 mg    metoprolol tartrate (LOPRESSOR) tablet 12.5 mg    [Held by provider] atorvastatin (LIPITOR) tablet 10 mg    pantoprazole (PROTONIX) tablet 40 mg    sodium chloride (NS) flush 5-40 mL    sodium chloride (NS) flush 5-40 mL    acetaminophen (TYLENOL) tablet 650 mg    Or    acetaminophen (TYLENOL) suppository 650 mg    polyethylene glycol (MIRALAX) packet 17 g    ondansetron (ZOFRAN ODT) tablet 4 mg    Or    ondansetron (ZOFRAN) injection 4 mg    dextrose 10% infusion 0-250 mL    balsam peru-castor oiL (VENELEX) ointment      Patient PCP: Unknown, Provider  PMH:  has a past medical history of Acute on chronic respiratory failure with hypoxia and hypercapnia ([facility] (11/20/2020), Atrial fibrillation ([facility] [facility] (11/4/2020), CHF (congestive heart failure) ([facility] [address] [facility] Diabetes ([facility] [address] Hypertension, Ill-defined condition, Ill-defined condition, JOSLYN (obstructive sleep apnea) (11/20/2020), Other and unspecified symptoms and signs involving general sensations and perceptions, and S/P cardiac cath (11/4/2020). PSH:   has a past surgical history that includes pr compre ep eval abltj 3d mapg tx svt (N/A, 11/23/2020); pr icar catheter ablation arrhythmia add on (N/A, 11/23/2020); pr intracardiac electrophysiologic 3d mapping (N/A, 11/23/2020); pr compre electrophysiol xm w/left atrial pacng/rec (N/A, 11/23/2020); and pr programmed stimj & pacg after iv drug nfs (N/A, 11/23/2020). FHX: family history is not on file. SHX:  reports that she has quit smoking.  She has never used smokeless tobacco. She reports that she does not drink alcohol and does not use drugs. ROS:A comprehensive review of systems was negative except for that written in the HPI. Objective:     Vital Signs: Telemetry:     Intake/Output:   Visit Vitals  BP (!) 96/43   Pulse 71   Temp 98.5 °F (36.9 °C)   Resp 26   Ht 5' 3\" (1.6 m)   Wt 110.7 kg (244 lb)   SpO2 96%   Breastfeeding No   BMI 43.22 kg/m²       Temp (24hrs), Av.6 °F (37 °C), Min:98 °F (36.7 °C), Max:100.4 °F (38 °C)        O2 Device: BIPAP O2 Flow Rate (L/min): 6 l/min       Wt Readings from Last 4 Encounters:   23 110.7 kg (244 lb)   06/15/22 136.8 kg (301 lb 9.6 oz)   22 135.5 kg (298 lb 11.6 oz)   22 134.7 kg (297 lb)          Intake/Output Summary (Last 24 hours) at 2023 0935  Last data filed at 2023 1801  Gross per 24 hour   Intake 390 ml   Output 170 ml   Net 220 ml         Last shift:      No intake/output data recorded. Last 3 shifts:  1901 -  0700  In: 0 [P.O.:540; I.V.:50]  Out: 670 [Urine:670]       Physical Exam:   General:  female; cooperative;  on NIV when seen. Alert and interactive. HEENT: NCAT, no dentition, lips and mucosa dry  Eyes: anicteric; conjunctiva clear  Neck: no nodes, no accessory MM use. Chest: no deformity, thick  Cardiac: regular rate, rhythm; no murmur  Lungs: distant breath sounds; no wheezes, deisy,  no rhonchi  Abd: soft, NT, hypoactive BS, OBESE,  Ext: 2-3+ edema; no joint swelling; No clubbing, has dressing to the RLE.    : No irwin,   Neuro: fluent,  follows commands; generalized weakness  Psych- no agitation, oriented to person;   Skin: warm, dry, no cyanosis; seems flush over upper chest.   Pulses: 1-2+ Bilateral pedal, radial  Capillary: brisk; pale    Labs:    Recent Labs     2315 23  0240   WBC 20.7*  --    HGB 6.9* 7.9*     --        Recent Labs     2315 23  0454 23  0240   * 135* 135*   K 4.1 4.4 3.9   CL 95* 93* 96*   CO2 36* 37* 38*   GLU 96 96 107* BUN 13 10 8   CREA 1.17* 1.08* 0.86   CA 8.1* 8.3* 8.1*   ALB 1.8*  --   --    ALT 24  --   --        No results for input(s): PH, PCO2, PO2, HCO3, FIO2 in the last 72 hours. Recent Labs     02/05/23  1537   CPK 21*       Lab Results   Component Value Date/Time    BNP 14 04/19/2009 05:00 PM        Lab Results   Component Value Date/Time    Culture result: NO GROWTH 6 DAYS 01/31/2023 02:54 AM    Culture result: NO GROWTH 6 DAYS 01/29/2023 12:38 PM    Culture result: NO GROWTH 6 DAYS 01/27/2023 04:45 AM     Lab Results   Component Value Date/Time    TSH 1.88 09/08/2014 02:20 PM       Imaging:    CXR Results  (Last 48 hours)      None          Please note that this dictation was completed with [employer], the computer voice recognition software. Quite often unanticipated grammatical, syntax, homophones, and other interpretive errors are inadvertently transcribed by the computer software. Please disregard these errors. Please excuse any errors that have escaped final proofreading  ______________________________________________________________________    2-4-23:   COMPARISON: 1/31/2023     FINDINGS:  AP portable upright view of the chest demonstrates a stable enlarged  cardiopericardial silhouette. The lungs are unchanged in overall appearance. .  Atelectasis on the right. Left subclavian venous access catheter is in place. Catheter tip is in appropriate position for use. There is no associated  pneumothorax. The osseous structures are unremarkable. Patient is on a cardiac  monitor. IMPRESSION  Central venous acces catheter in position for use. No pneumothorax     No other significant interval change. Thank you for allowing us to participate in the care of this patient.       This care involved high complexity medical decision making: I personally:  Reviewed the flowsheet and previous days notes  Reviewed and summarized records or history from previous days note or discussions with staff, family  High Risk Drug therapy requiring intensive monitoring for toxicity: eg steroids, antibiotics  Reviewed and/or ordered Clinical lab tests  Reviewed images and/or ordered Radiology tests  Reviewed and/or adjusted NiPPV settings  discussed my assessment/management with : Nursing, for coordination of care      Carmine Pride MD

## 2023-02-07 NOTE — PROGRESS NOTES
End of Shift Note    Bedside shift change report given to Jewel Troncoso RN (oncoming nurse) by Rodo Stubbs (offgoing nurse). Report included the following information SBAR, Kardex, ED Summary, Intake/Output, MAR, and Recent Results    Shift worked:  [id number]     Shift summary and any significant changes:     Pt remained on BIPAP overnight FIO2 back up to 50%.      Concerns for physician to address:       Zone phone for oncoming shift:              Rodo Stubbs

## 2023-02-07 NOTE — PROGRESS NOTES
Hospitalist Progress Note    NAME: Ileana Goldman   :  1960   MRN:  [id number]       Assessment / Plan:    Acute on chronic hypoxic respiratory failure POA  COPD-- end stage/Poor prognosis,   JOSLYN compliant with CPAP  Diastolic heart failure  Patient was on 4-6 L supplemental nasal cannula oxygen at home and BiPAP at night time. Patient oxygen requirements have increased. Went up to FiO2 50 % on BiPAP. Saturating 85% on 6 L NC oxygen. Will attribute this to anxiety more than anything else. Patient did not opt for hospice. Will re-consult pulmonology for any further recommendations.   Patient had to be placed back on BiPAP yesterday due to respiratory distress  When seen this morning resting on BiPAP, will try to wean it off during the day and switch to high flow  Patient is currently on Lasix  Repeat chest x-ray. Septic shock  MSSA bacteremia  - ID recommended discharge on daptomycin  - Outpatient follow-up with ID    Left femoral neck fracture  - No intervention per ortho  - Outpatient follow up with ortho     Acute on chronic anemia:  GI following, high risk for Endoscopy, colonscopy  Occult stool negative. No signs of active bleeding  GI performed EGD. High risk for colonoscopy. Signed off.   Hematology following   Has folate and iron deficiency, started on iv folic acid and iv iron last week  S/p transfusions  S/p EGD  by GI- found duodenitis/gastritis - recommends to cont PPI BID + carafate  Outpatient follow-up with hematology and GI     Hb 6.9 , PRBC ordered by heme-onc, appreciate help  Erythematous rash- persistent  S/p Pencillin and cephalosporins  Patient is now on daptomycin  Type II DM:  Continue lispro sliding scale     Paroxysmal afib  CAD:  Continue with metoprolol, hold statin while  Continue xarelto on discharge if hemoglobin stays stable  Aspirin discontinued given duodenitis and gastritis     Severe obesity  BMI 51  Complicates overall care    [facility] Injury:  Creatinine 1.17, on IV fluid  Lasix on hold due to rising creatinine  Patient reported not eating much    40 or above Morbid obesity / Body mass index is 43.22 kg/m². Estimated discharge date: 02/07/23  Barriers: Increasing oxygen requirements    Code status: DNR  Prophylaxis: Lovenox  Recommended Disposition: Home w/Family     Subjective: Follow-up acute hypoxic respiratory failure, COPD   was placed back on BiPAP yesterday, who was on BiPAP this morning  Reported not feeling well  Reported that she is not eating much  Objective:     VITALS:   Last 24hrs VS reviewed since prior progress note. Most recent are:  Patient Vitals for the past 24 hrs:   Temp Pulse Resp BP SpO2   02/07/23 0757 -- 71 26 (!) 96/43 96 %   02/07/23 0742 98.5 °F (36.9 °C) 71 25 (!) 77/37 93 %   02/07/23 0719 -- -- -- -- 96 %   02/07/23 0357 98.2 °F (36.8 °C) 69 27 (!) 94/52 90 %   02/07/23 0213 -- -- -- -- 91 %   02/07/23 0212 -- -- -- -- 90 %   02/06/23 2348 98.1 °F (36.7 °C) 70 18 (!) 92/43 91 %   02/06/23 2001 -- -- -- -- 90 %   02/06/23 1948 -- -- -- -- 90 %   02/06/23 1909 98.5 °F (36.9 °C) 72 21 114/63 (!) 89 %   02/06/23 1717 -- -- -- -- (!) 87 %   02/06/23 1716 -- -- -- -- (!) 86 %   02/06/23 1658 98 °F (36.7 °C) 70 29 (!) 96/50 (!) 87 %   02/06/23 1417 -- -- -- -- (!) 85 %   02/06/23 1330 -- 81 (!) 37 -- 91 %   02/06/23 1247 100.4 °F (38 °C) 80 (!) 31 (!) 110/51 (!) 89 %   02/06/23 1121 -- -- -- -- 90 %         Intake/Output Summary (Last 24 hours) at 2/7/2023 4095  Last data filed at 2/6/2023 1801  Gross per 24 hour   Intake 590 ml   Output 170 ml   Net 420 ml          I had a face to face encounter and independently examined this patient on 2/7/2023, as outlined below:  PHYSICAL EXAM:  General:          Alert, cooperative, no acute distress    EENT:              EOMI. Anicteric sclerae. Resp:               CTA bilaterally, no wheezing or rales.   No accessory muscle use  CV:                  Regular  rhythm,  No edema  GI:                   Soft, Non distended, Non tender. Neurologic:       Alert and oriented X 3, normal speech  Psych:   Good insight. Not anxious nor agitated  Skin:                Erythematous rash all over the body. No jaundice    Reviewed most current lab test results and cultures  YES  Reviewed most current radiology test results   YES  Review and summation of old records today    NO  Reviewed patient's current orders and MAR    YES  PMH/SH reviewed - no change compared to H&P  ________________________________________________________________________  Care Plan discussed with:    Comments   Patient [address] Y    Consultant                       Y Multidiciplinary team rounds were held today with , nursing, pharmacist and clinical coordinator. Patient's plan of care was discussed; medications were reviewed and discharge planning was addressed. Columba Lomeli MD     Procedures: see electronic medical records for all procedures/Xrays and details which were not copied into this note but were reviewed prior to creation of Plan. LABS:  I reviewed today's most current labs and imaging studies.   Pertinent labs include:  Recent Labs     02/07/23  0515 02/05/23  0240   WBC 20.7*  --    HGB 6.9* 7.9*   HCT 23.8* 28.5*     --        Recent Labs     02/07/23  0515 02/06/23  0454 02/05/23  0240   * 135* 135*   K 4.1 4.4 3.9   CL 95* 93* 96*   CO2 36* 37* 38*   GLU 96 96 107*   BUN 13 10 8   CREA 1.17* 1.08* 0.86   CA 8.1* 8.3* 8.1*   ALB 1.8*  --   --    TBILI 1.3*  --   --    ALT 24  --   --          Signed: Columba Lomeli MD

## 2023-02-07 NOTE — PROGRESS NOTES
9537: Bedside shift change report given to Ramsey Buck RN (oncoming nurse) by Obie Rudd RN (offgoing nurse). Report included the following information SBAR and Kardex. 1900: End of Shift Note    Bedside shift change report given to Obie Rudd RN (oncoming nurse) by Gokul Mario RN (offgoing nurse). Report included the following information SBAR and Kardex    Shift worked:  7a-7p     Shift summary and any significant changes:     Patient received 1 unit of PRBCs     Concerns for physician to address:       Zone phone for oncoming shift:          Activity:  Activity Level: Bed Rest  Number times ambulated in hallways past shift: 0  Number of times OOB to chair past shift: 0    Cardiac:   Cardiac Monitoring: Yes      Cardiac Rhythm: Sinus Rhythm    Access:  Current line(s): PIV and PICC     Genitourinary:   Urinary status: voiding    Respiratory:   O2 Device: BIPAP  Chronic home O2 use?: YES  Incentive spirometer at bedside: YES       GI:  Last Bowel Movement Date: 02/03/23  Current diet:  ADULT DIET Regular; Low Sodium (2 gm); Mashed potatoes with every lunch and dinner  ADULT ORAL NUTRITION SUPPLEMENT Breakfast; Standard 4 oz  ADULT ORAL NUTRITION SUPPLEMENT Lunch; Fortified Pudding  ADULT ORAL NUTRITION SUPPLEMENT Dinner; Frozen Supplement  DIET ONE TIME MESSAGE  Passing flatus: YES  Tolerating current diet: YES       Pain Management:   Patient states pain is manageable on current regimen: YES    Skin:  Gaston Score: 12  Interventions: turn team and increase time out of bed    Patient Safety:  Fall Score:  Total Score: 2  Interventions: bed/chair alarm, assistive device (walker, cane, etc), gripper socks, and pt to call before getting OOB       Length of Stay:  Expected LOS: 2d 16h  Actual LOS: Zeke Crawford RN

## 2023-02-07 NOTE — PROGRESS NOTES
Hematology Oncology Progress Note    Follow up for: anemia    Chart notes reviewed since last visit. Case discussed with following: RN at bedside (Mariaa Velazquez)     Patient complains of the following:  O2 sat 85-86% on nasal cannula. She reports feeling poorly but has hard time being specific. Acknowledges that she is bedbound at home - apparently care providers come to her. Additional concerns noted by the staff:      Patient Vitals for the past 24 hrs:   BP Temp Pulse Resp SpO2 Weight   02/07/23 1142 (!) 116/43 -- 79 -- -- --   02/07/23 1126 -- -- -- -- 95 % --   02/07/23 1106 (!) 116/43 98.2 °F (36.8 °C) 73 29 96 % --   02/07/23 0757 (!) 96/43 -- 71 26 96 % --   02/07/23 0742 (!) 77/37 98.5 °F (36.9 °C) 71 25 93 % --   02/07/23 0719 -- -- -- -- 96 % --   02/07/23 0357 (!) 94/52 98.2 °F (36.8 °C) 69 27 90 % --   02/07/23 0213 -- -- -- -- 91 % --   02/07/23 0212 -- -- -- -- 90 % --   02/06/23 2348 (!) 92/43 98.1 °F (36.7 °C) 70 18 91 % --   02/06/23 2001 -- -- -- -- 90 % --   02/06/23 1948 -- -- -- -- 90 % --   02/06/23 1909 114/63 98.5 °F (36.9 °C) 72 21 (!) 89 % --   02/06/23 1717 -- -- -- -- (!) 87 % --   02/06/23 1716 -- -- -- -- (!) 86 % --   02/06/23 1658 (!) 96/50 98 °F (36.7 °C) 70 29 (!) 87 % --   02/06/23 1417 -- -- -- -- (!) 85 % --   02/06/23 1348 -- -- -- -- -- 110.7 kg (244 lb)   02/06/23 1330 -- -- 81 (!) 37 91 % --   02/06/23 1247 (!) 110/51 100.4 °F (38 °C) 80 (!) 31 (!) 89 % --         Physical Examination:  General: on BiPap and tachypneic. O2 sat at 85% on monitor. HEENT: NC, AT   Neck: supple  Resp: tachypneic distant anteriorly  Abd Obese, + BS, size would preclude accurate assessment of liver and spleen size  Skin: bright erythematous macular rash on chest and b/l UE volar surfaces, progressed on chest with dry desquamation noted. Ext 1-2+ pitting edema b/l . Feet quite puffy  Neuro: able to sqeeze my hands bilaterally with good . Says no movement in legs. Psych: alert, conversant. Wants to go home. Labs:  Recent Results (from the past 24 hour(s))   GLUCOSE, POC    Collection Time: 02/06/23 12:08 PM   Result Value Ref Range    Glucose (POC) 144 (H) 65 - 117 mg/dL    Performed by Katya Madden PCT    GLUCOSE, POC    Collection Time: 02/06/23  6:37 PM   Result Value Ref Range    Glucose (POC) 154 (H) 65 - 117 mg/dL    Performed by Jayesh Guevara PCT    GLUCOSE, POC    Collection Time: 02/07/23 12:14 AM   Result Value Ref Range    Glucose (POC) 121 (H) 65 - 117 mg/dL    Performed by [place] Soren PCT    GLUCOSE, POC    Collection Time: 02/07/23  5:08 AM   Result Value Ref Range    Glucose (POC) 105 65 - 117 mg/dL    Performed by Anand García RN    CBC W/O DIFF    Collection Time: 02/07/23  5:15 AM   Result Value Ref Range    WBC 20.7 (H) 3.6 - 11.0 K/uL    RBC 2.29 (L) 3.80 - 5.20 M/uL    HGB 6.9 (L) 11.5 - 16.0 g/dL    HCT 23.8 (L) 35.0 - 47.0 %    .9 (H) 80.0 - 99.0 FL    MCH 30.1 26.0 - 34.0 PG    MCHC 29.0 (L) 30.0 - 36.5 g/dL    RDW 27.5 (H) 11.5 - 14.5 %    PLATELET 652 757 - 950 K/uL    MPV 11.7 8.9 - 12.9 FL    NRBC 2.0 (H) 0  WBC    ABSOLUTE NRBC 0.41 (H) 0.00 - 4.22 K/uL   METABOLIC PANEL, COMPREHENSIVE    Collection Time: 02/07/23  5:15 AM   Result Value Ref Range    Sodium 134 (L) 136 - 145 mmol/L    Potassium 4.1 3.5 - 5.1 mmol/L    Chloride 95 (L) 97 - 108 mmol/L    CO2 36 (H) 21 - 32 mmol/L    Anion gap 3 (L) 5 - 15 mmol/L    Glucose 96 65 - 100 mg/dL    BUN 13 6 - 20 MG/DL    Creatinine 1.17 (H) 0.55 - 1.02 MG/DL    BUN/Creatinine ratio 11 (L) 12 - 20      eGFR 52 (L) >60 ml/min/1.73m2    Calcium 8.1 (L) 8.5 - 10.1 MG/DL    Bilirubin, total 1.3 (H) 0.2 - 1.0 MG/DL    ALT (SGPT) 24 12 - 78 U/L    AST (SGOT) 41 (H) 15 - 37 U/L    Alk.  phosphatase 88 45 - 117 U/L    Protein, total 6.4 6.4 - 8.2 g/dL    Albumin 1.8 (L) 3.5 - 5.0 g/dL    Globulin 4.6 (H) 2.0 - 4.0 g/dL    A-G Ratio 0.4 (L) 1.1 - 2.2     GLUCOSE, POC    Collection Time: 02/07/23 11:58 AM   Result Value Ref Range    Glucose (POC) 109 65 - 117 mg/dL    Performed by Edita Ruffin PCT       EGD 2/1/23 Dr Stefano Cunha:  Findings:      Esophagus: The esophageal mucosa in the proximal, mid and distal esophagus is normal.   The squamo-columnar junction is at 44 cm where the Z-line was noted. Stomach: The gastric mucosa has moderately diffuse erythema compatible with gastritis in the body. Multiple biopsies taken. Antrum has spoke-like radial erythema(This does not look like GAVE though): biopsies taken  The fundus was found to be normal with no lesions noted on retroflexion. The angularis is normal as well. Duodenum:   The bulbar mucosa has patchy erythema with a few erosions. Biopsies obtained. The duodenal folds are normal in D2. No blood seen in the upper GI tract. Therapies:  biopsy of stomach body, antrum  biopsy of duodenal distal bulb    Assessment and Plan:   *) Anemia:  -folate and iron deficiency, started on folic acid and IV iron given last week.  -transfuse for HBG < 7, which she is today at 6.9. Reviewed with her potential side effects including tranfusion reaction and she is agreeable to proceeding. -PRBC given 1/30 and 2/94  - Switched folic acid to IV on 8/09- to ensure she is able to absorb supplement, she was still macrocytic and anemia not responding well to prbc, has some diarrhea issues making me question absorption. -GI following  - Labs repeated 1/31 ;and still not showing any hemolysis or DIC.  -Noted plans to possible move to hospice pending mtg later today    *) Thrombocytopenia- resolved:  - DDX: Drug related, infection, No DIC  - HIT ab negative, plt improved. *) Resp distress  -requires bipap at present. Efforts to wean down underway. *) Sepsis  - Weaned off Levophed. . On midodrine    *) Rash:   - likely drug rash?, switched zosyn to nafillin and now on Vancomycin, rash worse today  - Rash appeared 1/30 and received zosyn, IV KCL  and blood that day.   - Rash looks a little less erythematous today  - No fever

## 2023-02-08 LAB
ABO + RH BLD: NORMAL
ALBUMIN SERPL-MCNC: 1.8 G/DL (ref 3.5–5)
ALBUMIN/GLOB SERPL: 0.4 (ref 1.1–2.2)
ALP SERPL-CCNC: 99 U/L (ref 45–117)
ALT SERPL-CCNC: 24 U/L (ref 12–78)
ANION GAP SERPL CALC-SCNC: 5 MMOL/L (ref 5–15)
AST SERPL-CCNC: 46 U/L (ref 15–37)
BILIRUB SERPL-MCNC: 1.2 MG/DL (ref 0.2–1)
BLD PROD TYP BPU: NORMAL
BLOOD GROUP ANTIBODIES SERPL: NORMAL
BPU ID: NORMAL
BUN SERPL-MCNC: 15 MG/DL (ref 6–20)
BUN/CREAT SERPL: 12 (ref 12–20)
CALCIUM SERPL-MCNC: 8 MG/DL (ref 8.5–10.1)
CHLORIDE SERPL-SCNC: 95 MMOL/L (ref 97–108)
CO2 SERPL-SCNC: 34 MMOL/L (ref 21–32)
CREAT SERPL-MCNC: 1.22 MG/DL (ref 0.55–1.02)
CROSSMATCH RESULT,%XM: NORMAL
ERYTHROCYTE [DISTWIDTH] IN BLOOD BY AUTOMATED COUNT: 26.1 % (ref 11.5–14.5)
GLOBULIN SER CALC-MCNC: 4.9 G/DL (ref 2–4)
GLUCOSE BLD STRIP.AUTO-MCNC: 76 MG/DL (ref 65–117)
GLUCOSE BLD STRIP.AUTO-MCNC: 80 MG/DL (ref 65–117)
GLUCOSE BLD STRIP.AUTO-MCNC: 81 MG/DL (ref 65–117)
GLUCOSE BLD STRIP.AUTO-MCNC: 82 MG/DL (ref 65–117)
GLUCOSE BLD STRIP.AUTO-MCNC: 89 MG/DL (ref 65–117)
GLUCOSE SERPL-MCNC: 81 MG/DL (ref 65–100)
HCT VFR BLD AUTO: 28.7 % (ref 35–47)
HGB BLD-MCNC: 8.7 G/DL (ref 11.5–16)
MCH RBC QN AUTO: 31.2 PG (ref 26–34)
MCHC RBC AUTO-ENTMCNC: 30.3 G/DL (ref 30–36.5)
MCV RBC AUTO: 102.9 FL (ref 80–99)
NRBC # BLD: 0.23 K/UL (ref 0–0.01)
NRBC BLD-RTO: 1.2 PER 100 WBC
PLATELET # BLD AUTO: 171 K/UL (ref 150–400)
PMV BLD AUTO: 12.5 FL (ref 8.9–12.9)
POTASSIUM SERPL-SCNC: 4 MMOL/L (ref 3.5–5.1)
PROT SERPL-MCNC: 6.7 G/DL (ref 6.4–8.2)
RBC # BLD AUTO: 2.79 M/UL (ref 3.8–5.2)
SERVICE CMNT-IMP: NORMAL
SODIUM SERPL-SCNC: 134 MMOL/L (ref 136–145)
SPECIMEN EXP DATE BLD: NORMAL
STATUS OF UNIT,%ST: NORMAL
UNIT DIVISION, %UDIV: 0
WBC # BLD AUTO: 18.9 K/UL (ref 3.6–11)

## 2023-02-08 PROCEDURE — 74011250636 HC RX REV CODE- 250/636: Performed by: INTERNAL MEDICINE

## 2023-02-08 PROCEDURE — 74011250637 HC RX REV CODE- 250/637: Performed by: INTERNAL MEDICINE

## 2023-02-08 PROCEDURE — 36415 COLL VENOUS BLD VENIPUNCTURE: CPT

## 2023-02-08 PROCEDURE — 74011000250 HC RX REV CODE- 250: Performed by: STUDENT IN AN ORGANIZED HEALTH CARE EDUCATION/TRAINING PROGRAM

## 2023-02-08 PROCEDURE — 74011000258 HC RX REV CODE- 258: Performed by: INTERNAL MEDICINE

## 2023-02-08 PROCEDURE — 74011000250 HC RX REV CODE- 250: Performed by: INTERNAL MEDICINE

## 2023-02-08 PROCEDURE — 77010033678 HC OXYGEN DAILY

## 2023-02-08 PROCEDURE — 74011250637 HC RX REV CODE- 250/637: Performed by: STUDENT IN AN ORGANIZED HEALTH CARE EDUCATION/TRAINING PROGRAM

## 2023-02-08 PROCEDURE — 80053 COMPREHEN METABOLIC PANEL: CPT

## 2023-02-08 PROCEDURE — 74011250636 HC RX REV CODE- 250/636: Performed by: STUDENT IN AN ORGANIZED HEALTH CARE EDUCATION/TRAINING PROGRAM

## 2023-02-08 PROCEDURE — 74011000250 HC RX REV CODE- 250: Performed by: PHYSICIAN ASSISTANT

## 2023-02-08 PROCEDURE — 65270000046 HC RM TELEMETRY

## 2023-02-08 PROCEDURE — 94660 CPAP INITIATION&MGMT: CPT

## 2023-02-08 PROCEDURE — 82962 GLUCOSE BLOOD TEST: CPT

## 2023-02-08 PROCEDURE — 85027 COMPLETE CBC AUTOMATED: CPT

## 2023-02-08 PROCEDURE — 94640 AIRWAY INHALATION TREATMENT: CPT

## 2023-02-08 PROCEDURE — 74011000258 HC RX REV CODE- 258: Performed by: STUDENT IN AN ORGANIZED HEALTH CARE EDUCATION/TRAINING PROGRAM

## 2023-02-08 RX ADMIN — SODIUM CHLORIDE 100 ML/HR: 9 INJECTION, SOLUTION INTRAVENOUS at 18:01

## 2023-02-08 RX ADMIN — IPRATROPIUM BROMIDE AND ALBUTEROL SULFATE 3 ML: 2.5; .5 SOLUTION RESPIRATORY (INHALATION) at 07:23

## 2023-02-08 RX ADMIN — ENOXAPARIN SODIUM 30 MG: 100 INJECTION SUBCUTANEOUS at 12:11

## 2023-02-08 RX ADMIN — CAMPHOR, MENTHOL: .5; .5 LOTION TOPICAL at 08:59

## 2023-02-08 RX ADMIN — SODIUM CHLORIDE, PRESERVATIVE FREE 10 ML: 5 INJECTION INTRAVENOUS at 05:49

## 2023-02-08 RX ADMIN — IPRATROPIUM BROMIDE AND ALBUTEROL SULFATE 3 ML: 2.5; .5 SOLUTION RESPIRATORY (INHALATION) at 13:16

## 2023-02-08 RX ADMIN — SUCRALFATE 1 G: 1 TABLET ORAL at 21:52

## 2023-02-08 RX ADMIN — METOPROLOL TARTRATE 12.5 MG: 25 TABLET, FILM COATED ORAL at 17:51

## 2023-02-08 RX ADMIN — SODIUM CHLORIDE, PRESERVATIVE FREE 10 ML: 5 INJECTION INTRAVENOUS at 21:52

## 2023-02-08 RX ADMIN — CASTOR OIL AND BALSAM, PERU: 788; 87 OINTMENT TOPICAL at 09:00

## 2023-02-08 RX ADMIN — SUCRALFATE 1 G: 1 TABLET ORAL at 17:50

## 2023-02-08 RX ADMIN — POTASSIUM BICARBONATE 20 MEQ: 391 TABLET, EFFERVESCENT ORAL at 08:44

## 2023-02-08 RX ADMIN — SUCRALFATE 1 G: 1 TABLET ORAL at 08:46

## 2023-02-08 RX ADMIN — PANTOPRAZOLE SODIUM 40 MG: 40 TABLET, DELAYED RELEASE ORAL at 17:50

## 2023-02-08 RX ADMIN — MIDODRINE HYDROCHLORIDE 10 MG: 5 TABLET ORAL at 12:11

## 2023-02-08 RX ADMIN — SODIUM CHLORIDE, PRESERVATIVE FREE 10 ML: 5 INJECTION INTRAVENOUS at 17:56

## 2023-02-08 RX ADMIN — NYSTATIN: 100000 POWDER TOPICAL at 09:01

## 2023-02-08 RX ADMIN — FOLIC ACID: 5 INJECTION, SOLUTION INTRAMUSCULAR; INTRAVENOUS; SUBCUTANEOUS at 08:59

## 2023-02-08 RX ADMIN — PANTOPRAZOLE SODIUM 40 MG: 40 TABLET, DELAYED RELEASE ORAL at 08:46

## 2023-02-08 RX ADMIN — MIDODRINE HYDROCHLORIDE 10 MG: 5 TABLET ORAL at 08:46

## 2023-02-08 RX ADMIN — IPRATROPIUM BROMIDE AND ALBUTEROL SULFATE 3 ML: 2.5; .5 SOLUTION RESPIRATORY (INHALATION) at 20:06

## 2023-02-08 RX ADMIN — Medication 1 AMPULE: at 21:52

## 2023-02-08 RX ADMIN — IPRATROPIUM BROMIDE AND ALBUTEROL SULFATE 3 ML: 2.5; .5 SOLUTION RESPIRATORY (INHALATION) at 01:55

## 2023-02-08 RX ADMIN — METOPROLOL TARTRATE 12.5 MG: 25 TABLET, FILM COATED ORAL at 08:46

## 2023-02-08 RX ADMIN — NYSTATIN: 100000 POWDER TOPICAL at 17:55

## 2023-02-08 RX ADMIN — CASTOR OIL AND BALSAM, PERU: 788; 87 OINTMENT TOPICAL at 21:52

## 2023-02-08 RX ADMIN — ENOXAPARIN SODIUM 30 MG: 100 INJECTION SUBCUTANEOUS at 22:00

## 2023-02-08 RX ADMIN — CAMPHOR, MENTHOL: .5; .5 LOTION TOPICAL at 21:53

## 2023-02-08 RX ADMIN — Medication 1 AMPULE: at 08:57

## 2023-02-08 RX ADMIN — MIDODRINE HYDROCHLORIDE 10 MG: 5 TABLET ORAL at 17:51

## 2023-02-08 RX ADMIN — SUCRALFATE 1 G: 1 TABLET ORAL at 12:11

## 2023-02-08 RX ADMIN — Medication 1 CAPSULE: at 08:46

## 2023-02-08 RX ADMIN — DAPTOMYCIN 1000 MG: 500 INJECTION, POWDER, LYOPHILIZED, FOR SOLUTION INTRAVENOUS at 17:52

## 2023-02-08 NOTE — PROGRESS NOTES
End of Shift Note    Bedside shift change report given to Cindy Mart RN (oncoming nurse) by Latesha Alva (offgoing nurse). Report included the following information SBAR, Kardex, ED Summary, Intake/Output, MAR, and Recent Results    Shift worked:  [id number]     Shift summary and any significant changes:     Pt remained on BIPAP overnight. CHG bath completed. No acute changes.      Concerns for physician to address:  Continue to wean O2 demands     Zone phone for oncoming shift:            Latesha Alva

## 2023-02-08 NOTE — PROGRESS NOTES
Problem: Pressure Injury - Risk of  Goal: *Prevention of pressure injury  Description: Document Gaston Scale and appropriate interventions in the flowsheet. Outcome: Progressing Towards Goal  Note: Pressure Injury Interventions:  Sensory Interventions: Assess changes in LOC    Moisture Interventions: Maintain skin hydration (lotion/cream), Check for incontinence Q2 hours and as needed, Absorbent underpads    Activity Interventions: PT/OT evaluation    Mobility Interventions: Float heels, Turn and reposition approx. every two hours(pillow and wedges)    Nutrition Interventions: Document food/fluid/supplement intake, Offer support with meals,snacks and hydration    Friction and Shear Interventions: HOB 30 degrees or less                Problem: Falls - Risk of  Goal: *Absence of Falls  Description: Document Anna Fall Risk and appropriate interventions in the flowsheet.   Outcome: Progressing Towards Goal  Note: Fall Risk Interventions:  Mobility Interventions: Bed/chair exit alarm, Patient to call before getting OOB, Strengthening exercises (ROM-active/passive)         Medication Interventions: Bed/chair exit alarm, Patient to call before getting OOB, Teach patient to arise slowly    Elimination Interventions: Bed/chair exit alarm, Call light in reach, Patient to call for help with toileting needs              Problem: Heart Failure: Day 1  Goal: Off Pathway (Use only if patient is Off Pathway)  Outcome: Progressing Towards Goal

## 2023-02-08 NOTE — PROGRESS NOTES
ADULT PROTOCOL: JET AEROSOL  REASSESSMENT    Patient  Jules Zayas     60 y.o.   female     2/8/2023  1:17 PM    Breath Sounds Pre Procedure: Right Breath Sounds: Diminished                               Left Breath Sounds: Diminished    Breath Sounds Post Procedure: Right Breath Sounds: Diminished                                 Left Breath Sounds: Diminished    Breathing pattern: Pre procedure Breathing Pattern: Tachypneic          Post procedure Breathing Pattern: Tachypneic    Heart Rate: Pre procedure Pulse: 61           Post procedure Pulse: 60    Resp Rate: Pre procedure Respirations: 29           Post procedure Respirations: 24    Cough: Pre procedure Cough: Non-productive               Post procedure Cough: Non-productive    Oxygen: O2 Device: BIPAP        Changed: no    SpO2: Pre procedure SpO2: 96 %                 Post procedure SpO2: 100 %      Nebulizer Therapy: Current medications Aerosolized Medications: DuoNeb      Changed: no    Problem List:   Patient Active Problem List   Diagnosis Code    COVID-19 ruled out Z20.822    [facility]) I48.91    S/P cardiac cath Z98.890    Mixed hyperlipidemia E78.2    Morbid obesity ([facility]) E66.01    Acute respiratory failure ([facility]) J96.00    Atrial flutter with rapid ventricular response ([facility]) I48.92    JOSLYN (obstructive sleep apnea) G47.33    Acute on chronic respiratory failure with hypoxia and hypercapnia ([facility]) J96.21, J96.22    SOB (shortness of breath) R06.02    Physical deconditioning R53.81    Counseling regarding advance care planning and goals of care Z71.89    Chronic pain G89.29    Dependence on supplemental oxygen Z99.81    Essential hypertension I10    Heart failure ([facility]) I50.9    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

## 2023-02-08 NOTE — PROGRESS NOTES
Hospitalist Progress Note    NAME: Timbo Kerns   :  1960   MRN:  [id number]       Assessment / Plan:    Acute on chronic hypoxic respiratory failure POA  COPD-- end stage/Poor prognosis,   JOSLYN compliant with CPAP  Diastolic heart failure  Patient was on 4-6 L supplemental nasal cannula oxygen at home and BiPAP at night time. Patient oxygen requirements have increased. Went up to FiO2 50 % on BiPAP. Saturating 85% on 6 L NC oxygen. Will attribute this to anxiety more than anything else. Patient did not opt for hospice. Will re-consult pulmonology for any further recommendations.   Patient had to be placed back on BiPAP yesterday due to respiratory distress  When seen this morning resting on BiPAP, will try to wean it off during the day and switch to high flow  Patient is currently on Lasix  Repeat chest x-ray. Showed no acute changes     Patient was on BiPAP the whole day, still short of breath and tachypneic  We will try to transition off to nasal cannula    Septic shock  MSSA bacteremia  Leukocytosis  - ID recommended discharge on daptomycin  - Outpatient follow-up with ID    Left femoral neck fracture  - No intervention per ortho  - Outpatient follow up with ortho     Acute on chronic anemia:  GI following, high risk for Endoscopy, colonscopy  Occult stool negative. No signs of active bleeding  GI performed EGD. High risk for colonoscopy. Signed off.   Hematology following   Has folate and iron deficiency, started on iv folic acid and iv iron last week  S/p transfusions  S/p EGD  by GI- found duodenitis/gastritis - recommends to cont PPI BID + carafate  Outpatient follow-up with hematology and GI     Hb 6.9 , PRBC ordered by heme-onc, appreciate help  :  Repeat hemoglobin 8.7, persistent leukocytosis  Erythematous rash- persistent  S/p Pencillin and cephalosporins  Patient is now on daptomycin  Type II DM:  Continue lispro sliding scale     Paroxysmal afib  CAD:  Continue with metoprolol, hold statin while  Continue xarelto on discharge if hemoglobin stays stable  Aspirin discontinued given duodenitis and gastritis     Severe obesity  BMI 51  Complicates overall care    Acute kindey Injury:  Creatinine 1.17, on IV fluid, creatinine continues to trend up  Lasix on hold due to rising creatinine  Patient reported not eating much    40 or above Morbid obesity / Body mass index is 43.22 kg/m². Estimated discharge date:02/13  Barriers: Increasing oxygen requirements    Code status: DNR  Prophylaxis: Lovenox  Recommended Disposition: Home w/Family     Subjective: Follow-up acute hypoxic respiratory failure, COPD  Remains on BiPAP this morning  Still feeling short of breath  Objective:     VITALS:   Last 24hrs VS reviewed since prior progress note.  Most recent are:  Patient Vitals for the past 24 hrs:   Temp Pulse Resp BP SpO2   02/08/23 1316 -- -- -- -- 96 %   02/08/23 1211 -- (!) 59 -- (!) 109/50 --   02/08/23 1200 -- 62 -- -- --   02/08/23 1135 -- -- -- -- 96 %   02/08/23 1115 98.1 °F (36.7 °C) 60 30 (!) 120/58 93 %   02/08/23 0909 -- -- -- -- 96 %   02/08/23 0846 -- 64 -- (!) 121/54 --   02/08/23 0800 -- 69 -- -- --   02/08/23 0725 -- -- -- -- 100 %   02/08/23 0724 -- -- -- -- 100 %   02/08/23 0723 98.4 °F (36.9 °C) 61 27 (!) 100/41 98 %   02/08/23 0415 -- -- -- -- 94 %   02/08/23 0327 98.2 °F (36.8 °C) 62 27 (!) 90/44 93 %   02/08/23 0155 -- -- -- -- 93 %   02/08/23 0000 98.5 °F (36.9 °C) 60 23 (!) 98/41 94 %   02/07/23 2332 -- -- -- -- 94 %   02/07/23 2200 -- 61 30 (!) 101/48 93 %   02/07/23 2043 -- -- -- -- 91 %   02/07/23 2025 -- 62 25 (!) 99/57 93 %   02/07/23 2010 -- 64 -- (!) 107/55 92 %   02/07/23 1958 98.1 °F (36.7 °C) 62 (!) 35 (!) 107/56 92 %   02/07/23 1825 -- 66 -- (!) 101/45 --   02/07/23 1718 98.3 °F (36.8 °C) 66 27 (!) 104/40 95 %   02/07/23 1602 98.3 °F (36.8 °C) 69 24 (!) 111/43 96 %   02/07/23 1551 -- 70 -- -- --   02/07/23 1526 -- -- -- -- 95 %         Intake/Output Summary (Last 24 hours) at 2/8/2023 1430  Last data filed at 2/7/2023 2025  Gross per 24 hour   Intake 333 ml   Output 300 ml   Net 33 ml          I had a face to face encounter and independently examined this patient on 2/8/2023, as outlined below:  PHYSICAL EXAM:  General:          Alert, cooperative, no acute distress    EENT:              EOMI. Anicteric sclerae. Resp:               CTA bilaterally, no wheezing or rales. No accessory muscle use  CV:                  Regular  rhythm,  No edema  GI:                   Soft, Non distended, Non tender. Neurologic:       Alert and oriented X 3, normal speech  Psych:   Good insight. Not anxious nor agitated  Skin:                Erythematous rash all over the body. No jaundice    Reviewed most current lab test results and cultures  YES  Reviewed most current radiology test results   YES  Review and summation of old records today    NO  Reviewed patient's current orders and MAR    YES  PMH/SH reviewed - no change compared to H&P  ________________________________________________________________________  Care Plan discussed with:    Comments   Patient [address]    Consultant                       Y Multidiciplinary team rounds were held today with , nursing, pharmacist and clinical coordinator. Patient's plan of care was discussed; medications were reviewed and discharge planning was addressed. Felipe Lawson MD     Procedures: see electronic medical records for all procedures/Xrays and details which were not copied into this note but were reviewed prior to creation of Plan. LABS:  I reviewed today's most current labs and imaging studies.   Pertinent labs include:  Recent Labs     02/08/23 0252 02/07/23  0515   WBC 18.9* 20.7*   HGB 8.7* 6.9*   HCT 28.7* 23.8*    165       Recent Labs     02/08/23 0252 02/07/23  0515 02/06/23  0454   * 134* 135*   K 4.0 4.1 4.4   CL 95* 95* 93* CO2 34* 36* 37*   GLU 81 96 96   BUN 15 13 10   CREA 1.22* 1.17* 1.08*   CA 8.0* 8.1* 8.3*   ALB 1.8* 1.8*  --    TBILI 1.2* 1.3*  --    ALT 24 24  --          Signed: Britt Manjarrez MD

## 2023-02-08 NOTE — PROGRESS NOTES
Hematology Oncology Progress Note    Follow up for: anemia    Chart notes reviewed since last visit. Case discussed with following: Kayleen Pritchett RN    Patient complains of the following:  \"I can't breathe. \" Does not feel well. Bipap mask riding up on face and to be adjusted by nurse. Additional concerns noted by the staff:  not eating or drinking well. Patient Vitals for the past 24 hrs:   BP Temp Pulse Resp SpO2   02/08/23 1211 (!) 109/50 -- (!) 59 -- --   02/08/23 1200 -- -- 62 -- --   02/08/23 1135 -- -- -- -- 96 %   02/08/23 1115 (!) 120/58 98.1 °F (36.7 °C) 60 30 93 %   02/08/23 0909 -- -- -- -- 96 %   02/08/23 0846 (!) 121/54 -- 64 -- --   02/08/23 0800 -- -- 69 -- --   02/08/23 0725 -- -- -- -- 100 %   02/08/23 0724 -- -- -- -- 100 %   02/08/23 0723 (!) 100/41 98.4 °F (36.9 °C) 61 27 98 %   02/08/23 0415 -- -- -- -- 94 %   02/08/23 0327 (!) 90/44 98.2 °F (36.8 °C) 62 27 93 %   02/08/23 0155 -- -- -- -- 93 %   02/08/23 0000 (!) 98/41 98.5 °F (36.9 °C) 60 23 94 %   02/07/23 2332 -- -- -- -- 94 %   02/07/23 2200 (!) 101/48 -- 61 30 93 %   02/07/23 2043 -- -- -- -- 91 %   02/07/23 2025 (!) 99/57 -- 62 25 93 %   02/07/23 2010 (!) 107/55 -- 64 -- 92 %   02/07/23 1958 (!) 107/56 98.1 °F (36.7 °C) 62 (!) 35 92 %   02/07/23 1825 (!) 101/45 -- 66 -- --   02/07/23 1718 (!) 104/40 98.3 °F (36.8 °C) 66 27 95 %   02/07/23 1602 (!) 111/43 98.3 °F (36.8 °C) 69 24 96 %   02/07/23 1551 -- -- 70 -- --   02/07/23 1526 -- -- -- -- 95 %         Physical Examination:  General: on BiPap and tachypneic. O2 sat at 96% on monitor. HEENT: NC, AT   Neck: supple  Resp: tachypneic distant anteriorly  Abd Obese, + BS, size would preclude accurate assessment of liver and spleen size  Skin: bright erythematous macular rash on chest and b/l UE volar surfaces, progressed on chest with dry desquamation noted. Ext 1-2+ pitting edema b/l . Feet quite puffy  Neuro: able to sqeeze my hands bilaterally with good .  Says no movement in legs.   Psych: alert, conversant. Labs:  Recent Results (from the past 24 hour(s))   TYPE & SCREEN    Collection Time: 02/07/23  1:01 PM   Result Value Ref Range    Crossmatch Expiration 02/10/2023,2359     ABO/Rh(D) David Castro POSITIVE     Antibody screen NEG     Unit number [id number]     Blood component type RC LR     Unit division 00     Status of unit TRANSFUSED     Crossmatch result Compatible    GLUCOSE, POC    Collection Time: 02/07/23  5:33 PM   Result Value Ref Range    Glucose (POC) 106 65 - 117 mg/dL    Performed by Wiley Haley PCT    GLUCOSE, POC    Collection Time: 02/08/23 12:06 AM   Result Value Ref Range    Glucose (POC) 89 65 - 117 mg/dL    Performed by Sam Urban    CBC W/O DIFF    Collection Time: 02/08/23  2:52 AM   Result Value Ref Range    WBC 18.9 (H) 3.6 - 11.0 K/uL    RBC 2.79 (L) 3.80 - 5.20 M/uL    HGB 8.7 (L) 11.5 - 16.0 g/dL    HCT 28.7 (L) 35.0 - 47.0 %    .9 (H) 80.0 - 99.0 FL    MCH 31.2 26.0 - 34.0 PG    MCHC 30.3 30.0 - 36.5 g/dL    RDW 26.1 (H) 11.5 - 14.5 %    PLATELET 476 136 - 042 K/uL    MPV 12.5 8.9 - 12.9 FL    NRBC 1.2 (H) 0  WBC    ABSOLUTE NRBC 0.23 (H) 0.00 - 8.59 K/uL   METABOLIC PANEL, COMPREHENSIVE    Collection Time: 02/08/23  2:52 AM   Result Value Ref Range    Sodium 134 (L) 136 - 145 mmol/L    Potassium 4.0 3.5 - 5.1 mmol/L    Chloride 95 (L) 97 - 108 mmol/L    CO2 34 (H) 21 - 32 mmol/L    Anion gap 5 5 - 15 mmol/L    Glucose 81 65 - 100 mg/dL    BUN 15 6 - 20 MG/DL    Creatinine 1.22 (H) 0.55 - 1.02 MG/DL    BUN/Creatinine ratio 12 12 - 20      eGFR 50 (L) >60 ml/min/1.73m2    Calcium 8.0 (L) 8.5 - 10.1 MG/DL    Bilirubin, total 1.2 (H) 0.2 - 1.0 MG/DL    ALT (SGPT) 24 12 - 78 U/L    AST (SGOT) 46 (H) 15 - 37 U/L    Alk.  phosphatase 99 45 - 117 U/L    Protein, total 6.7 6.4 - 8.2 g/dL    Albumin 1.8 (L) 3.5 - 5.0 g/dL    Globulin 4.9 (H) 2.0 - 4.0 g/dL    A-G Ratio 0.4 (L) 1.1 - 2.2     GLUCOSE, POC    Collection Time: 02/08/23  5:38 AM Result Value Ref Range    Glucose (POC) 80 65 - 117 mg/dL    Performed by Luke Leal    GLUCOSE, POC    Collection Time: 02/08/23 11:14 AM   Result Value Ref Range    Glucose (POC) 76 65 - 117 mg/dL    Performed by Raúl Cristina       EGD 2/1/23 Dr Hernandez [place]:  Findings:      Esophagus: The esophageal mucosa in the proximal, mid and distal esophagus is normal.   The squamo-columnar junction is at 44 cm where the Z-line was noted. Stomach: The gastric mucosa has moderately diffuse erythema compatible with gastritis in the body. Multiple biopsies taken. Antrum has spoke-like radial erythema(This does not look like GAVE though): biopsies taken  The fundus was found to be normal with no lesions noted on retroflexion. The angularis is normal as well. Duodenum:   The bulbar mucosa has patchy erythema with a few erosions. Biopsies obtained. The duodenal folds are normal in D2. No blood seen in the upper GI tract. Therapies:  biopsy of stomach body, antrum  biopsy of duodenal distal bulb    Assessment and Plan:   *) Anemia:  - folate and iron deficiency, started on folic acid and IV iron given last week. - transfuse for HBG < 7  - Hgb 8.7  today after transfusion yesterday  - PRBC given 1/30 and 2/11  - Switched folic acid to IV on 5/17- to ensure she is able to absorb supplement, she was still macrocytic and anemia not responding well to prbc, has some diarrhea issues making me question absorption. -GI following  - Labs repeated 1/31 ;and still not showing any hemolysis or DIC.    *) Thrombocytopenia- resolved:  - DDX: Drug related, infection, No DIC  - HIT ab negative, plt improved. *) Resp distress  -requires bipap at present. Efforts to wean down underway. *) Sepsis  - Weaned off Levophed. . On midodrine    *) Rash:   - likely drug rash?, switched zosyn to nafillin and now on Vancomycin, rash worse today  - Rash appeared 1/30 and received zosyn, IV KCL  and blood that day.   - Rash looks a little less erythematous today  - No fever

## 2023-02-08 NOTE — PROGRESS NOTES
1201: Bedside shift change report given to Beata Finch RN (oncoming nurse) by Siddharth Gaines RN (offgoing nurse). Report included the following information SBAR and Kardex. 1900: End of Shift Note    Bedside shift change report given to Lewis Morales RN (oncoming nurse) by Cole Bal RN (offgoing nurse). Report included the following information SBAR, Kardex, and MAR    Shift worked:  7a-7p     Shift summary and any significant changes:     No significant changes     Concerns for physician to address:  O2 demand. Poor appetite      Zone phone for oncoming shift:          Activity:  Activity Level: Bed Rest  Number times ambulated in hallways past shift: 0  Number of times OOB to chair past shift: 0    Cardiac:   Cardiac Monitoring: Yes      Cardiac Rhythm: Sinus Rhythm    Access:  Current line(s): PIV and PICC     Genitourinary:   Urinary status: voiding and external catheter    Respiratory:   O2 Device: BIPAP  Chronic home O2 use?: YES  Incentive spirometer at bedside: YES       GI:  Last Bowel Movement Date: 02/07/23  Current diet:  ADULT DIET Regular; Low Sodium (2 gm); Mashed potatoes with every lunch and dinner  ADULT ORAL NUTRITION SUPPLEMENT Breakfast; Standard 4 oz  ADULT ORAL NUTRITION SUPPLEMENT Lunch; Fortified Pudding  ADULT ORAL NUTRITION SUPPLEMENT Dinner; Frozen Supplement  DIET ONE TIME MESSAGE  Passing flatus: YES  Tolerating current diet: NO       Pain Management:   Patient states pain is manageable on current regimen: YES    Skin:  Gaston Score: 11  Interventions: increase time out of bed and PT/OT consult    Patient Safety:  Fall Score:  Total Score: 2  Interventions: bed/chair alarm, assistive device (walker, cane, etc), gripper socks, and pt to call before getting OOB       Length of Stay:  Expected LOS: 2d 16h  Actual LOS: Kizzy Lee RN

## 2023-02-08 NOTE — CONSULTS
Pulmonary, Critical Care, and Sleep Medicine      Name: Kajal Geronimo MRN: [id number]   : 1960 Hospital: [facility]   Date: 2023  Admission date: 2023 Hospital Day:        History:     23: Medical records and data reviewed. Pt no longer able to go to physician offices. Followed by home visiting physicians? Had Trilogy machine at home. Likes CPAP machine here better. Found to be eating when fully supine. Pt strongly advised to sit up for meals. High risk of aspiration. 23: We were asked to re evaluate the pt for worsening hypoxia. When seen she was on the bipap with fio2 of 40% . She was only able to give limited hx. She felt like her breathing was not comfortable. NO sputum production. No other overt changes. Discussed with her bedside nurse Zachery Chavis. WE were asked to reconsult on the pt.     23: PT has been about the same. ON NIV when seen this am.   Discussed with nurse this am. Pt denies chest pain. States she does not feel well but not able to get much more details. 23: She has remained on NIV. She has ongoing chest discomfort, dyspnea. She has a home trilogy machine. Reviewed vitals, nursing and EHR notes from last 24 hours. She has been spending most of her time on the bipap. IMPRESSION:   Acute chronic respiratory failure with Hypoxia and Hypercapnea; home O2, NIV, had worsening hypoxia today. She was on NIV when seen, Fio2 at 40%. Pox was 88-90%. Can be placed on High flow NC as able. Right basilar atelectasis. Possible issues with Volume overload and fluid retention in the context of morbid obesity. Has been with anasarca.    Asthma (vs COPD) not acutely exacerbated; normal eosinophils, she is pretty clear anteriorly when seen this pm. .   Former smoker  Suspected JOSLYN and /or OHS  (COPD OHS Thoracic Restrictive disease ) Restrictive disease from obesity J84.4 and E 66.8  Afib, refractory  DM   Non-ambulatory at baseline due to arthritis, Obesity, SOB ; bedbound; wear adult diapers  Discussed with nurse and RT. Morbid Obesity Body mass index is 43.22 kg/m². RECOMMENDATIONS/PLAN:   Continue noninvasive ventilation, Will adjust fio2 to keep pox over 90%. If off the bipap, will place on midflow. Will repeat CXR in am.   Follow up with home visiting physician group. Will not be able to recertify O2 or NIV since she no longer comes to our office. Aspiration precautions  bronchial hygiene with respiratory therapy techniques, bronchodilators  DVT prophylaxis  Prescription drug management with home med reconciliation reviewed          [x] High complexity decision making was performed  [x] See my orders for details      Subjective/Initial History:     I was asked by Enrique Lorenz DO to see David Prajapati  a 60 y.o.  female in consultation for a chief complaint of respiratory failure    Excerpts from admission 1/21/2023 or consult notes as follows:     \"   Liza Wilde is a 60 y.o.  female with pertinent past medical history of chronic hypoxemic respiratory failure on 4 L/min home oxygen therapy secondary to COPD, diabetes mellitus, essential hypertension, hyperlipidemia, JOSLYN compliant with CPAP, history duodenal ulcers, chronic anemia of indeterminate etiology who presents after routine lab work performed yesterday demonstrated hemoglobin of 5.3. Patient denies any sources of blood loss including hematochezia, melena, hematuria, hematemesis, epistaxis, bruising/bleeding. She reports she has had lower than normal blood pressures at home with systolics in the 06L when her baseline is in the 130s, but denies any other associated symptoms. Medication reconciliation does not list any anticoagulation, but patient reports she is taking a blood thinner-unsure which one. ROS otherwise negative. Patient denies tobacco, alcohol, or illicit drug use.      In the ED, patient afebrile and hemodynamically stable saturating upper 90s on baseline 4 L/min home oxygen therapy. Labs demonstrate: WBC 9.3, hemoglobin 5.3, platelets 670, sodium 140, potassium 4.4, chloride 95, bicarb 42 (chronic), glucose 152, BUN 17, creatinine 0.89, lactic acid 3.86 improved to 2.4 with IV fluids and 1 unit packed red blood cells. Fecal occult blood negative. UA not reflexed to culture. CXR demonstrates diffuse hazy interstitial opacities suggestive of edema with bandlike right upper lobe atelectasis. \"    Patient poor historian. Patient sent home April 2022 with noninvasive ventilation. Patient thinks she is on CPAP. Now on BiPAP. She has never made it back to the office for clinic follow-up. [employer] is [employer]. She does not recall having had a sleep study. At 1 point she weighed 310 pounds. She states she lost 50 pounds. Weight on admission 282 pounds. BMI 50 kg/m². Patient says she is bedbound. Daughter helps her at home. Daughter has back problems. Patient without any stomach pain. She is thirsty all the time. She has no teeth. Last saw Dr. Daryn Cruz in the office December 2020. Echocardiogram April 2022 left ejection fraction 50 to 65%     PAST MEDICAL HISTORY:  Significant for AFib, CHF, diabetes, hypertension, obstructive sleep apnea, COPD    Allergies   Allergen Reactions    Cefepime Rash     tolerated nafcillin 1/2023    Nafcillin Rash    Cefazolin Rash    Other Plant, Animal, Environmental Rash     Ivory soap caused rash on hands.     Zosyn [Piperacillin-Tazobactam] Rash     Possible rash to zosyn, also received IV contrast and blood  Tolerated nafcillin before/after 1/2023        MAR reviewed and pertinent medications noted or modified as needed       Current Facility-Administered Medications   Medication    0.9% sodium chloride infusion 250 mL    acetaminophen (TYLENOL) tablet 650 mg    diphenhydrAMINE (BENADRYL) capsule 25 mg    DAPTOmycin (CUBICIN) 1,000 mg in 0.9% sodium chloride 50 mL IVPB    0.9% sodium chloride infusion    nystatin (MYCOSTATIN) 100,000 unit/gram powder    sucralfate (CARAFATE) tablet 1 g    camphor-menthoL (SARNA) 0.5-0.5 % lotion    albuterol-ipratropium (DUO-NEB) 2.5 MG-0.5 MG/3 ML    L.acidophilus-paracasei-S.thermophil-bifidobacter (RISAQUAD) 8 billion cell capsule    oxyCODONE-acetaminophen (PERCOCET) 5-325 mg per tablet 1 Tablet    potassium bicarb-citric acid (EFFER-K) tablet 20 mEq    folic acid (FOLVITE) 1 mg in 0.9% sodium chloride 50 mL ivpb    [Held by provider] furosemide (LASIX) tablet 40 mg    midodrine (PROAMATINE) tablet 10 mg    enoxaparin (LOVENOX) injection 30 mg    alcohol 62% (NOZIN) nasal  1 Ampule    insulin lispro (HUMALOG) injection    glucose chewable tablet 16 g    glucagon (GLUCAGEN) injection 1 mg    metoprolol tartrate (LOPRESSOR) tablet 12.5 mg    [Held by provider] atorvastatin (LIPITOR) tablet 10 mg    pantoprazole (PROTONIX) tablet 40 mg    sodium chloride (NS) flush 5-40 mL    sodium chloride (NS) flush 5-40 mL    acetaminophen (TYLENOL) tablet 650 mg    Or    acetaminophen (TYLENOL) suppository 650 mg    polyethylene glycol (MIRALAX) packet 17 g    ondansetron (ZOFRAN ODT) tablet 4 mg    Or    ondansetron (ZOFRAN) injection 4 mg    dextrose 10% infusion 0-250 mL    balsam peru-castor oiL (VENELEX) ointment      Patient PCP: Unknown, Provider  PMH:  has a past medical history of Acute on chronic respiratory failure with hypoxia and hypercapnia ([facility] [address] (11/20/2020), Atrial fibrillation ([facility] [address] (11/4/2020), CHF (congestive heart failure) ([facility] [address] Diabetes ([facility] [address] Hypertension, Ill-defined condition, Ill-defined condition, JOSLYN (obstructive sleep apnea) (11/20/2020), Other and unspecified symptoms and signs involving general sensations and perceptions, and S/P cardiac cath (11/4/2020).   PSH:   has a past surgical history that includes pr compre ep eval abltj 3d mapg tx svt (N/A, 11/23/2020); pr icar catheter ablation arrhythmia add on (N/A, 2020); pr intracardiac electrophysiologic 3d mapping (N/A, 2020); pr compre electrophysiol xm w/left atrial pacng/rec (N/A, 2020); and pr programmed stimj & pacg after iv drug nfs (N/A, 2020). FHX: family history is not on file. SHX:  reports that she has quit smoking. She has never used smokeless tobacco. She reports that she does not drink alcohol and does not use drugs. ROS:A comprehensive review of systems was negative except for that written in the HPI. Objective:     Vital Signs: Telemetry:     Intake/Output:   Visit Vitals  BP (!) 109/50   Pulse (!) 59   Temp 98.1 °F (36.7 °C)   Resp 30   Ht 5' 3\" (1.6 m)   Wt 110.7 kg (244 lb)   SpO2 96%   Breastfeeding No   BMI 43.22 kg/m²       Temp (24hrs), Av.3 °F (36.8 °C), Min:98.1 °F (36.7 °C), Max:98.5 °F (36.9 °C)        O2 Device: BIPAP O2 Flow Rate (L/min): 6 l/min       Wt Readings from Last 4 Encounters:   23 110.7 kg (244 lb)   06/15/22 136.8 kg (301 lb 9.6 oz)   22 135.5 kg (298 lb 11.6 oz)   22 134.7 kg (297 lb)          Intake/Output Summary (Last 24 hours) at 2023 1317  Last data filed at 2023  Gross per 24 hour   Intake 333 ml   Output 300 ml   Net 33 ml         Last shift:      No intake/output data recorded. Last 3 shifts:  1901 -  0700  In: 333   Out: 300 [Urine:300]       Physical Exam:   General:  female; cooperative;  on NIV when seen. Alert and interactive. No distress. HEENT: NCAT, no dentition, lips and mucosa dry  Eyes: anicteric; conjunctiva clear  Neck: no nodes, no accessory MM use. Chest: no deformity, thick  Cardiac: regular rate, rhythm; no murmur  Lungs: distant breath sounds; no wheezes, deisy,  no rhonchi, seems comfortable with NIV inplace   Abd: soft, NT, hypoactive BS, OBESE,  Ext: 2-3+ edema; no joint swelling; No clubbing, has dressing to the RLE.    : No irwin,   Neuro: fluent,  follows commands; generalized weakness  Psych- no agitation, oriented to person;   Skin: warm, dry, no cyanosis; seems flush over upper chest.   Pulses: 1-2+ Bilateral pedal, radial  Capillary: brisk; pale    Labs:    Recent Labs     02/08/23  0252 02/07/23  0515   WBC 18.9* 20.7*   HGB 8.7* 6.9*    165       Recent Labs     02/08/23  0252 02/07/23  0515 02/06/23  0454   * 134* 135*   K 4.0 4.1 4.4   CL 95* 95* 93*   CO2 34* 36* 37*   GLU 81 96 96   BUN 15 13 10   CREA 1.22* 1.17* 1.08*   CA 8.0* 8.1* 8.3*   ALB 1.8* 1.8*  --    ALT 24 24  --        No results for input(s): PH, PCO2, PO2, HCO3, FIO2 in the last 72 hours. Recent Labs     02/05/23  1537   CPK 21*       Lab Results   Component Value Date/Time    BNP 14 04/19/2009 05:00 PM        Lab Results   Component Value Date/Time    Culture result: NO GROWTH 6 DAYS 01/31/2023 02:54 AM    Culture result: NO GROWTH 6 DAYS 01/29/2023 12:38 PM    Culture result: NO GROWTH 6 DAYS 01/27/2023 04:45 AM     Lab Results   Component Value Date/Time    TSH 1.88 09/08/2014 02:20 PM       Imaging:    CXR Results  (Last 48 hours)                 02/07/23 1608  XR CHEST PORT Final result    Impression:  No significant change. Narrative:  INDICATION:    fu sob        EXAMINATION:  AP CHEST, PORTABLE       COMPARISON: February 4       FINDINGS: Single AP portable view of the chest at 1552 hours demonstrates a left   PICC line, unchanged. The cardiomediastinal silhouette is stable. There is no   new airspace disease. Patchy airspace opacity at the right lung base and in the   right midlung is similar to prior study. There is also retrocardiac   opacification. Examination is limited by portable AP technique and morbid   patient obesity. Please note that this dictation was completed with [employer], the [employer] voice recognition software. Quite often unanticipated grammatical, syntax, homophones, and other interpretive errors are inadvertently transcribed by the computer software.   Please disregard these errors. Please excuse any errors that have escaped final proofreading  ______________________________________________________________________    2-4-23:   COMPARISON: 1/31/2023     FINDINGS:  AP portable upright view of the chest demonstrates a stable enlarged  cardiopericardial silhouette. The lungs are unchanged in overall appearance. .  Atelectasis on the right. Left subclavian venous access catheter is in place. Catheter tip is in appropriate position for use. There is no associated  pneumothorax. The osseous structures are unremarkable. Patient is on a cardiac  monitor. IMPRESSION  Central venous acces catheter in position for use. No pneumothorax     No other significant interval change. Thank you for allowing us to participate in the care of this patient.       This care involved high complexity medical decision making: I personally:  Reviewed the flowsheet and previous days notes  Reviewed and summarized records or history from previous days note or discussions with staff, family  High Risk Drug therapy requiring intensive monitoring for toxicity: eg steroids, antibiotics  Reviewed and/or ordered Clinical lab tests  Reviewed images and/or ordered Radiology tests  Reviewed and/or adjusted NiPPV settings  discussed my assessment/management with : Nursing, for coordination of care      Lauren Mcneal MD

## 2023-02-09 ENCOUNTER — APPOINTMENT (OUTPATIENT)
Dept: GENERAL RADIOLOGY | Age: 63
End: 2023-02-09
Attending: INTERNAL MEDICINE
Payer: MEDICARE

## 2023-02-09 LAB
ALBUMIN SERPL-MCNC: 1.8 G/DL (ref 3.5–5)
ALBUMIN/GLOB SERPL: 0.4 (ref 1.1–2.2)
ALP SERPL-CCNC: 104 U/L (ref 45–117)
ALT SERPL-CCNC: 22 U/L (ref 12–78)
ANION GAP SERPL CALC-SCNC: 6 MMOL/L (ref 5–15)
ARTERIAL PATENCY WRIST A: YES
AST SERPL-CCNC: 41 U/L (ref 15–37)
BASE EXCESS BLDA CALC-SCNC: 4.2 MMOL/L
BASOPHILS # BLD: 0 K/UL (ref 0–0.1)
BASOPHILS NFR BLD: 0 % (ref 0–1)
BDY SITE: ABNORMAL
BILIRUB SERPL-MCNC: 1.6 MG/DL (ref 0.2–1)
BREATHS.SPONTANEOUS ON VENT: 32
BUN SERPL-MCNC: 18 MG/DL (ref 6–20)
BUN/CREAT SERPL: 14 (ref 12–20)
CALCIUM SERPL-MCNC: 7.7 MG/DL (ref 8.5–10.1)
CHLORIDE SERPL-SCNC: 97 MMOL/L (ref 97–108)
CO2 SERPL-SCNC: 34 MMOL/L (ref 21–32)
CREAT SERPL-MCNC: 1.26 MG/DL (ref 0.55–1.02)
DIFFERENTIAL METHOD BLD: ABNORMAL
EOSINOPHIL # BLD: 0.1 K/UL (ref 0–0.4)
EOSINOPHIL NFR BLD: 1 % (ref 0–7)
ERYTHROCYTE [DISTWIDTH] IN BLOOD BY AUTOMATED COUNT: 27 % (ref 11.5–14.5)
FIO2 ON VENT: 50 %
GLOBULIN SER CALC-MCNC: 4.7 G/DL (ref 2–4)
GLUCOSE BLD STRIP.AUTO-MCNC: 77 MG/DL (ref 65–117)
GLUCOSE BLD STRIP.AUTO-MCNC: 78 MG/DL (ref 65–117)
GLUCOSE BLD STRIP.AUTO-MCNC: 82 MG/DL (ref 65–117)
GLUCOSE BLD STRIP.AUTO-MCNC: 90 MG/DL (ref 65–117)
GLUCOSE SERPL-MCNC: 67 MG/DL (ref 65–100)
HCO3 BLDA-SCNC: 32 MMOL/L (ref 22–26)
HCT VFR BLD AUTO: 29.6 % (ref 35–47)
HGB BLD-MCNC: 8.8 G/DL (ref 11.5–16)
IMM GRANULOCYTES # BLD AUTO: 0.1 K/UL (ref 0–0.04)
IMM GRANULOCYTES NFR BLD AUTO: 1 % (ref 0–0.5)
LYMPHOCYTES # BLD: 0.9 K/UL (ref 0.8–3.5)
LYMPHOCYTES NFR BLD: 7 % (ref 12–49)
MCH RBC QN AUTO: 30.7 PG (ref 26–34)
MCHC RBC AUTO-ENTMCNC: 29.7 G/DL (ref 30–36.5)
MCV RBC AUTO: 103.1 FL (ref 80–99)
MONOCYTES # BLD: 0.5 K/UL (ref 0–1)
MONOCYTES NFR BLD: 4 % (ref 5–13)
NEUTS SEG # BLD: 10.8 K/UL (ref 1.8–8)
NEUTS SEG NFR BLD: 87 % (ref 32–75)
NRBC # BLD: 0.08 K/UL (ref 0–0.01)
NRBC BLD-RTO: 0.6 PER 100 WBC
PCO2 BLDA: 59 MMHG (ref 35–45)
PH BLDA: 7.35 (ref 7.35–7.45)
PLATELET # BLD AUTO: 172 K/UL (ref 150–400)
PLATELET COMMENTS,PCOM: ABNORMAL
PMV BLD AUTO: 11.9 FL (ref 8.9–12.9)
PO2 BLDA: 62 MMHG (ref 80–100)
POTASSIUM SERPL-SCNC: 4.2 MMOL/L (ref 3.5–5.1)
PROT SERPL-MCNC: 6.5 G/DL (ref 6.4–8.2)
RBC # BLD AUTO: 2.87 M/UL (ref 3.8–5.2)
RBC MORPH BLD: ABNORMAL
RBC MORPH BLD: ABNORMAL
SAO2 % BLD: 90 % (ref 92–97)
SAO2% DEVICE SAO2% SENSOR NAME: ABNORMAL
SERVICE CMNT-IMP: NORMAL
SODIUM SERPL-SCNC: 137 MMOL/L (ref 136–145)
SPECIMEN SITE: ABNORMAL
WBC # BLD AUTO: 12.4 K/UL (ref 3.6–11)

## 2023-02-09 PROCEDURE — 80053 COMPREHEN METABOLIC PANEL: CPT

## 2023-02-09 PROCEDURE — 74011000258 HC RX REV CODE- 258: Performed by: STUDENT IN AN ORGANIZED HEALTH CARE EDUCATION/TRAINING PROGRAM

## 2023-02-09 PROCEDURE — 74011250637 HC RX REV CODE- 250/637: Performed by: INTERNAL MEDICINE

## 2023-02-09 PROCEDURE — 82803 BLOOD GASES ANY COMBINATION: CPT

## 2023-02-09 PROCEDURE — 74011250636 HC RX REV CODE- 250/636: Performed by: STUDENT IN AN ORGANIZED HEALTH CARE EDUCATION/TRAINING PROGRAM

## 2023-02-09 PROCEDURE — 94660 CPAP INITIATION&MGMT: CPT

## 2023-02-09 PROCEDURE — 74011000250 HC RX REV CODE- 250: Performed by: STUDENT IN AN ORGANIZED HEALTH CARE EDUCATION/TRAINING PROGRAM

## 2023-02-09 PROCEDURE — 65270000046 HC RM TELEMETRY

## 2023-02-09 PROCEDURE — 74011250637 HC RX REV CODE- 250/637: Performed by: NURSE PRACTITIONER

## 2023-02-09 PROCEDURE — 99233 SBSQ HOSP IP/OBS HIGH 50: CPT | Performed by: NURSE PRACTITIONER

## 2023-02-09 PROCEDURE — 74011000250 HC RX REV CODE- 250: Performed by: INTERNAL MEDICINE

## 2023-02-09 PROCEDURE — 36600 WITHDRAWAL OF ARTERIAL BLOOD: CPT

## 2023-02-09 PROCEDURE — 71045 X-RAY EXAM CHEST 1 VIEW: CPT

## 2023-02-09 PROCEDURE — 94640 AIRWAY INHALATION TREATMENT: CPT

## 2023-02-09 PROCEDURE — 82962 GLUCOSE BLOOD TEST: CPT

## 2023-02-09 PROCEDURE — 74011000258 HC RX REV CODE- 258: Performed by: INTERNAL MEDICINE

## 2023-02-09 PROCEDURE — 74011250637 HC RX REV CODE- 250/637: Performed by: STUDENT IN AN ORGANIZED HEALTH CARE EDUCATION/TRAINING PROGRAM

## 2023-02-09 PROCEDURE — 74011250636 HC RX REV CODE- 250/636: Performed by: INTERNAL MEDICINE

## 2023-02-09 PROCEDURE — 36415 COLL VENOUS BLD VENIPUNCTURE: CPT

## 2023-02-09 PROCEDURE — 85025 COMPLETE CBC W/AUTO DIFF WBC: CPT

## 2023-02-09 PROCEDURE — 74011000250 HC RX REV CODE- 250: Performed by: PHYSICIAN ASSISTANT

## 2023-02-09 RX ORDER — MORPHINE SULFATE ORAL SOLUTION 10 MG/5ML
5 SOLUTION ORAL
Status: DISCONTINUED | OUTPATIENT
Start: 2023-02-09 | End: 2023-02-10 | Stop reason: HOSPADM

## 2023-02-09 RX ORDER — MORPHINE SULFATE 20 MG/ML
5 SOLUTION ORAL
Status: DISCONTINUED | OUTPATIENT
Start: 2023-02-09 | End: 2023-02-09 | Stop reason: CLARIF

## 2023-02-09 RX ADMIN — SUCRALFATE 1 G: 1 TABLET ORAL at 17:56

## 2023-02-09 RX ADMIN — DAPTOMYCIN 1000 MG: 500 INJECTION, POWDER, LYOPHILIZED, FOR SOLUTION INTRAVENOUS at 18:02

## 2023-02-09 RX ADMIN — SODIUM CHLORIDE, PRESERVATIVE FREE 10 ML: 5 INJECTION INTRAVENOUS at 05:24

## 2023-02-09 RX ADMIN — SUCRALFATE 1 G: 1 TABLET ORAL at 07:43

## 2023-02-09 RX ADMIN — NYSTATIN: 100000 POWDER TOPICAL at 12:09

## 2023-02-09 RX ADMIN — MIDODRINE HYDROCHLORIDE 10 MG: 5 TABLET ORAL at 17:56

## 2023-02-09 RX ADMIN — MIDODRINE HYDROCHLORIDE 10 MG: 5 TABLET ORAL at 11:59

## 2023-02-09 RX ADMIN — SUCRALFATE 1 G: 1 TABLET ORAL at 21:15

## 2023-02-09 RX ADMIN — FOLIC ACID: 5 INJECTION, SOLUTION INTRAMUSCULAR; INTRAVENOUS; SUBCUTANEOUS at 09:04

## 2023-02-09 RX ADMIN — SUCRALFATE 1 G: 1 TABLET ORAL at 11:58

## 2023-02-09 RX ADMIN — SODIUM CHLORIDE 100 ML/HR: 9 INJECTION, SOLUTION INTRAVENOUS at 21:16

## 2023-02-09 RX ADMIN — CAMPHOR, MENTHOL: .5; .5 LOTION TOPICAL at 12:07

## 2023-02-09 RX ADMIN — SODIUM CHLORIDE, PRESERVATIVE FREE 10 ML: 5 INJECTION INTRAVENOUS at 21:15

## 2023-02-09 RX ADMIN — CASTOR OIL AND BALSAM, PERU: 788; 87 OINTMENT TOPICAL at 17:58

## 2023-02-09 RX ADMIN — SODIUM CHLORIDE, PRESERVATIVE FREE 10 ML: 5 INJECTION INTRAVENOUS at 18:09

## 2023-02-09 RX ADMIN — Medication 1 AMPULE: at 08:59

## 2023-02-09 RX ADMIN — ENOXAPARIN SODIUM 30 MG: 100 INJECTION SUBCUTANEOUS at 12:00

## 2023-02-09 RX ADMIN — Medication 1 CAPSULE: at 08:59

## 2023-02-09 RX ADMIN — IPRATROPIUM BROMIDE AND ALBUTEROL SULFATE 3 ML: 2.5; .5 SOLUTION RESPIRATORY (INHALATION) at 02:57

## 2023-02-09 RX ADMIN — SODIUM CHLORIDE 100 ML/HR: 9 INJECTION, SOLUTION INTRAVENOUS at 18:09

## 2023-02-09 RX ADMIN — PANTOPRAZOLE SODIUM 40 MG: 40 TABLET, DELAYED RELEASE ORAL at 17:56

## 2023-02-09 RX ADMIN — PANTOPRAZOLE SODIUM 40 MG: 40 TABLET, DELAYED RELEASE ORAL at 07:47

## 2023-02-09 RX ADMIN — MIDODRINE HYDROCHLORIDE 10 MG: 5 TABLET ORAL at 07:47

## 2023-02-09 RX ADMIN — NYSTATIN: 100000 POWDER TOPICAL at 17:58

## 2023-02-09 RX ADMIN — CASTOR OIL AND BALSAM, PERU: 788; 87 OINTMENT TOPICAL at 21:15

## 2023-02-09 RX ADMIN — METOPROLOL TARTRATE 12.5 MG: 25 TABLET, FILM COATED ORAL at 17:56

## 2023-02-09 RX ADMIN — Medication 1 AMPULE: at 21:16

## 2023-02-09 RX ADMIN — IPRATROPIUM BROMIDE AND ALBUTEROL SULFATE 3 ML: 2.5; .5 SOLUTION RESPIRATORY (INHALATION) at 20:02

## 2023-02-09 RX ADMIN — SODIUM CHLORIDE 100 ML/HR: 9 INJECTION, SOLUTION INTRAVENOUS at 04:48

## 2023-02-09 RX ADMIN — IPRATROPIUM BROMIDE AND ALBUTEROL SULFATE 3 ML: 2.5; .5 SOLUTION RESPIRATORY (INHALATION) at 07:11

## 2023-02-09 RX ADMIN — CAMPHOR, MENTHOL: .5; .5 LOTION TOPICAL at 21:15

## 2023-02-09 RX ADMIN — POTASSIUM BICARBONATE 20 MEQ: 391 TABLET, EFFERVESCENT ORAL at 08:58

## 2023-02-09 RX ADMIN — METOPROLOL TARTRATE 12.5 MG: 25 TABLET, FILM COATED ORAL at 09:00

## 2023-02-09 RX ADMIN — ENOXAPARIN SODIUM 30 MG: 100 INJECTION SUBCUTANEOUS at 22:50

## 2023-02-09 RX ADMIN — MORPHINE SULFATE 5 MG: 10 SOLUTION ORAL at 19:50

## 2023-02-09 RX ADMIN — IPRATROPIUM BROMIDE AND ALBUTEROL SULFATE 3 ML: 2.5; .5 SOLUTION RESPIRATORY (INHALATION) at 14:18

## 2023-02-09 NOTE — PROGRESS NOTES
Problem: Pressure Injury - Risk of  Goal: *Prevention of pressure injury  Description: Document Gaston Scale and appropriate interventions in the flowsheet. Outcome: Progressing Towards Goal  Note: Pressure Injury Interventions:  Sensory Interventions: Assess changes in LOC    Moisture Interventions: Maintain skin hydration (lotion/cream), Internal/External urinary devices    Activity Interventions: PT/OT evaluation    Mobility Interventions: Float heels, HOB 30 degrees or less    Nutrition Interventions: Document food/fluid/supplement intake    Friction and Shear Interventions: Apply protective barrier, creams and emollients, Lift sheet, Minimize layers                Problem: Falls - Risk of  Goal: *Absence of Falls  Description: Document Anna Fall Risk and appropriate interventions in the flowsheet.   Outcome: Progressing Towards Goal  Note: Fall Risk Interventions:  Mobility Interventions: Bed/chair exit alarm         Medication Interventions: Bed/chair exit alarm    Elimination Interventions: Bed/chair exit alarm              Problem: Heart Failure: Day 1  Goal: Off Pathway (Use only if patient is Off Pathway)  Outcome: Progressing Towards Goal

## 2023-02-09 NOTE — CONSULTS
Pulmonary, Critical Care, and Sleep Medicine      Name: Troy Sanz MRN: [id number]   : 1960 Hospital: [facility]   Date: 2023  Admission date: 2023 Hospital Day: 20       History:     23: Medical records and data reviewed. Pt no longer able to go to physician offices. Followed by home visiting physicians? Had Trilogy machine at home. Likes CPAP machine here better. Found to be eating when fully supine. Pt strongly advised to sit up for meals. High risk of aspiration. 23: We were asked to re evaluate the pt for worsening hypoxia. When seen she was on the bipap with fio2 of 40% . She was only able to give limited hx. She felt like her breathing was not comfortable. NO sputum production. No other overt changes. Discussed with her bedside nurse Shannon Bedoya. WE were asked to reconsult on the pt.     23: PT has been about the same. ON NIV when seen this am.   Discussed with nurse this am. Pt denies chest pain. States she does not feel well but not able to get much more details. 23: She has remained on NIV. She has ongoing chest discomfort, dyspnea. She has a home trilogy machine. Reviewed vitals, nursing and EHR notes from last 24 hours. She has been spending most of her time on the bipap. 23:     Pt has been pretty much stuck on bipap. This am I was present with RT as he tried her on maximal high flow. Despite high flow her oxygen levels were still in mid 80s. She was have nursing care and repositioning. She had to be placed back on the bipap. She would prefer the high flow but I stated her oxygen levels are dropping to low to be maintained on the high flow. She feels as though it is difficult for her to breath. Reviewed and she has been here since 23. Despite maximal medical therapy she has not been able to improve. Prior to admission she had been pretty much house bound.  I think it is time to get palliative care back involved and consider trying to get pt into hospice. IMPRESSION:   Acute chronic respiratory failure with Hypoxia and Hypercapnea; home O2, NIV, had worsening hypoxia today. She was on NIV when seen, Fio2 at 40%. Pox was 88-90%. Can be placed on High flow NC as able. Right basilar atelectasis. Possible issues with Volume overload and fluid retention in the context of morbid obesity. Has been with anasarca. Asthma (vs COPD) not acutely exacerbated; normal eosinophils, she is pretty clear anteriorly when seen this pm. .   Former smoker  Suspected JOSLYN and /or OHS  (COPD OHS Thoracic Restrictive disease ) Restrictive disease from obesity J84.4 and E 66.8  Afib, refractory  DM   Non-ambulatory at baseline due to arthritis, Obesity, SOB ; bedbound; wear adult diapers  Discussed with nurse and RT. Morbid Obesity Body mass index is 43.22 kg/m². RECOMMENDATIONS/PLAN:     Continue noninvasive ventilation, Will adjust fio2 to keep pox over 90%. If off the bipap, will place on midflow. Will ask palliative to re evaluate. At this point I think she needs hospice. Follow up with home visiting physician group. Will not be able to recertify O2 or NIV since she no longer comes to our office. Aspiration precautions  bronchial hygiene with respiratory therapy techniques, bronchodilators  DVT prophylaxis  Prescription drug management with home med reconciliation reviewed          [x] High complexity decision making was performed  [x] See my orders for details      Subjective/Initial History:     I was asked by Sage Panda DO to see Elda Luna  a 60 y.o.    female in consultation for a chief complaint of respiratory failure    Excerpts from admission 1/21/2023 or consult notes as follows:     \"   Isela Tejada is a 60 y.o.  female with pertinent past medical history of chronic hypoxemic respiratory failure on 4 L/min home oxygen therapy secondary to COPD, diabetes mellitus, essential hypertension, hyperlipidemia, JOSLYN compliant with CPAP, history duodenal ulcers, chronic anemia of indeterminate etiology who presents after routine lab work performed yesterday demonstrated hemoglobin of 5.3. Patient denies any sources of blood loss including hematochezia, melena, hematuria, hematemesis, epistaxis, bruising/bleeding. She reports she has had lower than normal blood pressures at home with systolics in the 91T when her baseline is in the 130s, but denies any other associated symptoms. Medication reconciliation does not list any anticoagulation, but patient reports she is taking a blood thinner-unsure which one. ROS otherwise negative. Patient denies tobacco, alcohol, or illicit drug use. In the ED, patient afebrile and hemodynamically stable saturating upper 90s on baseline 4 L/min home oxygen therapy. Labs demonstrate: WBC 9.3, hemoglobin 5.3, platelets 479, sodium 140, potassium 4.4, chloride 95, bicarb 42 (chronic), glucose 152, BUN 17, creatinine 0.89, lactic acid 3.86 improved to 2.4 with IV fluids and 1 unit packed red blood cells. Fecal occult blood negative. UA not reflexed to culture. CXR demonstrates diffuse hazy interstitial opacities suggestive of edema with bandlike right upper lobe atelectasis. \"    Patient poor historian. Patient sent home April 2022 with noninvasive ventilation. Patient thinks she is on CPAP. Now on BiPAP. She has never made it back to the office for clinic follow-up. DME company is [employer]. She does not recall having had a sleep study. At 1 point she weighed 310 pounds. She states she lost 50 pounds. Weight on admission 282 pounds. BMI 50 kg/m². Patient says she is bedbound. Daughter helps her at home. Daughter has back problems. Patient without any stomach pain. She is thirsty all the time. She has no teeth. Last saw Dr. Lynn Kramer in the office December 2020.     Echocardiogram April 2022 left ejection fraction 50 to 65%     PAST MEDICAL HISTORY:  Significant for AFib, CHF, diabetes, hypertension, obstructive sleep apnea, COPD    Allergies   Allergen Reactions    Cefepime Rash     tolerated nafcillin 1/2023    Nafcillin Rash    Cefazolin Rash    Other Plant, Animal, Environmental Rash     Ivory soap caused rash on hands.     Zosyn [Piperacillin-Tazobactam] Rash     Possible rash to zosyn, also received IV contrast and blood  Tolerated nafcillin before/after 1/2023        MAR reviewed and pertinent medications noted or modified as needed       Current Facility-Administered Medications   Medication    0.9% sodium chloride infusion 250 mL    acetaminophen (TYLENOL) tablet 650 mg    diphenhydrAMINE (BENADRYL) capsule 25 mg    DAPTOmycin (CUBICIN) 1,000 mg in 0.9% sodium chloride 50 mL IVPB    0.9% sodium chloride infusion    nystatin (MYCOSTATIN) 100,000 unit/gram powder    sucralfate (CARAFATE) tablet 1 g    camphor-menthoL (SARNA) 0.5-0.5 % lotion    albuterol-ipratropium (DUO-NEB) 2.5 MG-0.5 MG/3 ML    L.acidophilus-paracasei-S.thermophil-bifidobacter (RISAQUAD) 8 billion cell capsule    oxyCODONE-acetaminophen (PERCOCET) 5-325 mg per tablet 1 Tablet    potassium bicarb-citric acid (EFFER-K) tablet 20 mEq    folic acid (FOLVITE) 1 mg in 0.9% sodium chloride 50 mL ivpb    [Held by provider] furosemide (LASIX) tablet 40 mg    midodrine (PROAMATINE) tablet 10 mg    enoxaparin (LOVENOX) injection 30 mg    alcohol 62% (NOZIN) nasal  1 Ampule    insulin lispro (HUMALOG) injection    glucose chewable tablet 16 g    glucagon (GLUCAGEN) injection 1 mg    metoprolol tartrate (LOPRESSOR) tablet 12.5 mg    [Held by provider] atorvastatin (LIPITOR) tablet 10 mg    pantoprazole (PROTONIX) tablet 40 mg    sodium chloride (NS) flush 5-40 mL    sodium chloride (NS) flush 5-40 mL    acetaminophen (TYLENOL) tablet 650 mg    Or    acetaminophen (TYLENOL) suppository 650 mg    polyethylene glycol (MIRALAX) packet 17 g    ondansetron (ZOFRAN ODT) tablet 4 mg    Or    ondansetron (ZOFRAN) injection 4 mg    dextrose 10% infusion 0-250 mL    balsam peru-castor oiL (VENELEX) ointment      Patient PCP: Unknown, Provider  PMH:  has a past medical history of Acute on chronic respiratory failure with hypoxia and hypercapnia ([facility] (2020), Atrial fibrillation ([facility] (2020), CHF (congestive heart failure) ([facility] Diabetes ([facility] Hypertension, Ill-defined condition, Ill-defined condition, JOSLYN (obstructive sleep apnea) (2020), Other and unspecified symptoms and signs involving general sensations and perceptions, and S/P cardiac cath (2020). PSH:   has a past surgical history that includes pr compre ep eval abltj 3d mapg tx svt (N/A, 2020); pr icar catheter ablation arrhythmia add on (N/A, 2020); pr intracardiac electrophysiologic 3d mapping (N/A, 2020); pr compre electrophysiol xm w/left atrial pacng/rec (N/A, 2020); and pr programmed stimj & pacg after iv drug nfs (N/A, 2020). FHX: family history is not on file. SHX:  reports that she has quit smoking. She has never used smokeless tobacco. She reports that she does not drink alcohol and does not use drugs. ROS:A comprehensive review of systems was negative except for that written in the HPI. Objective:     Vital Signs: Telemetry:     Intake/Output:   Visit Vitals  BP (!) 115/55   Pulse 75   Temp 97.8 °F (36.6 °C)   Resp 28   Ht 5' 3\" (1.6 m)   Wt 110.7 kg (244 lb)   SpO2 90%   Breastfeeding No   BMI 43.22 kg/m²       Temp (24hrs), Av.1 °F (36.7 °C), Min:97.8 °F (36.6 °C), Max:98.4 °F (36.9 °C)        O2 Device: BIPAP O2 Flow Rate (L/min): 6 l/min       Wt Readings from Last 4 Encounters:   23 110.7 kg (244 lb)   06/15/22 136.8 kg (301 lb 9.6 oz)   22 135.5 kg (298 lb 11.6 oz)   22 134.7 kg (297 lb)        No intake or output data in the 24 hours ending 23 1132      Last shift:      No intake/output data recorded.   Last 3 shifts: 02/07 1901 - 02/09 0700  In: 333   Out: -        Physical Exam:   General:  female; cooperative;  on NIV when seen. Alert and interactive. No distress. HEENT: NCAT, no dentition, lips and mucosa dry  Eyes: anicteric; conjunctiva clear  Neck: no nodes, no accessory MM use. Chest: no deformity, thick  Cardiac: regular rate, rhythm; no murmur  Lungs: distant breath sounds; no wheezes, deisy,  no rhonchi, On NIV when seen. When on high flow could not get her POx above mid 80s. Abd: soft, NT, hypoactive BS, OBESE,  Ext: 2-3+ edema; no joint swelling; No clubbing, has dressing to the RLE. : No irwin,   Neuro: fluent,  follows commands; generalized weakness  Psych- no agitation, oriented to person;   Skin: warm, dry, no cyanosis; seems flush over upper chest.   Pulses: 1-2+ Bilateral pedal, radial  Capillary: brisk; pale    Labs:    Recent Labs     02/09/23 0219 02/08/23  0252 02/07/23  0515   WBC 12.4* 18.9* 20.7*   HGB 8.8* 8.7* 6.9*    171 165       Recent Labs     02/09/23 0219 02/08/23  0252 02/07/23  0515    134* 134*   K 4.2 4.0 4.1   CL 97 95* 95*   CO2 34* 34* 36*   GLU 67 81 96   BUN 18 15 13   CREA 1.26* 1.22* 1.17*   CA 7.7* 8.0* 8.1*   ALB 1.8* 1.8* 1.8*   ALT 22 24 24       Recent Labs     02/09/23  1126   PH 7.35   PCO2 59*   PO2 62*   HCO3 32*   FIO2 50     No results for input(s): CPK, CKNDX, TROIQ in the last 72 hours. No lab exists for component: CPKMB    Lab Results   Component Value Date/Time    BNP 14 04/19/2009 05:00 PM        Lab Results   Component Value Date/Time    Culture result: NO GROWTH 6 DAYS 01/31/2023 02:54 AM    Culture result: NO GROWTH 6 DAYS 01/29/2023 12:38 PM    Culture result: NO GROWTH 6 DAYS 01/27/2023 04:45 AM     Lab Results   Component Value Date/Time    TSH 1.88 09/08/2014 02:20 PM       Imaging:    CXR Results  (Last 48 hours)                 02/09/23 0557  XR CHEST PORT Final result    Impression:  Stable cardiomegaly.  Worsening right lung interstitial and patchy   airspace consolidation, most extensive within the right midlung and right lung   base. Narrative:  INDICATION: Interval change. Portable AP view of the chest.       Direct comparison made to prior chest x-ray dated February 7, 2023. Cardiomediastinal silhouette is stable, but enlarged. There is worsening right   lung interstitial and patchy air space consolidation, most extensively in the   right midlung base. No definite pleural fluid is visualized. There is no   pneumothorax. Left-sided PICC line extends to the proximal SVC.           02/07/23 1608  XR CHEST PORT Final result    Impression:  No significant change. Narrative:  INDICATION:    fu sob        EXAMINATION:  AP CHEST, PORTABLE       COMPARISON: February 4       FINDINGS: Single AP portable view of the chest at 1552 hours demonstrates a left   PICC line, unchanged. The cardiomediastinal silhouette is stable. There is no   new airspace disease. Patchy airspace opacity at the right lung base and in the   right midlung is similar to prior study. There is also retrocardiac   opacification. Examination is limited by portable AP technique and morbid   patient obesity. Please note that this dictation was completed with [email or web address], the mAPPn voice recognition software. Quite often unanticipated grammatical, syntax, homophones, and other interpretive errors are inadvertently transcribed by the computer software. Please disregard these errors. Please excuse any errors that have escaped final proofreading  ______________________________________________________________________    2-4-23:   COMPARISON: 1/31/2023     FINDINGS:  AP portable upright view of the chest demonstrates a stable enlarged  cardiopericardial silhouette. The lungs are unchanged in overall appearance. .  Atelectasis on the right. Left subclavian venous access catheter is in place.   Catheter tip is in appropriate position for use. There is no associated  pneumothorax. The osseous structures are unremarkable. Patient is on a cardiac  monitor. IMPRESSION  Central venous acces catheter in position for use. No pneumothorax     No other significant interval change. Thank you for allowing us to participate in the care of this patient.       This care involved high complexity medical decision making: I personally:  Reviewed the flowsheet and previous days notes  Reviewed and summarized records or history from previous days note or discussions with staff, family  High Risk Drug therapy requiring intensive monitoring for toxicity: eg steroids, antibiotics  Reviewed and/or ordered Clinical lab tests  Reviewed images and/or ordered Radiology tests  Reviewed and/or adjusted NiPPV settings  discussed my assessment/management with : Nursing, for coordination of care      Carmine Pride MD

## 2023-02-09 NOTE — PROGRESS NOTES
3593: Bedside shift change report given to Alfred Quezada RN (oncoming nurse) by Otis Lopez RN (offgoing nurse). Report included the following information SBAR and Kardex. 1900: End of Shift Note    Bedside shift change report given to Otis Lopez RN (oncoming nurse) by Tray Nobles RN (offgoing nurse). Report included the following information SBAR, Kardex, and MAR    Shift worked:  7a-7p     Shift summary and any significant changes:     Hospice and palliative consulted. Concerns for physician to address:  None at this time     Zone phone for oncoming shift:          Activity:  Activity Level: Bed Rest  Number times ambulated in hallways past shift: 0  Number of times OOB to chair past shift: 0    Cardiac:   Cardiac Monitoring: Yes      Cardiac Rhythm: Sinus Rhythm    Access:  Current line(s): PIV and PICC     Genitourinary:   Urinary status: voiding and external catheter    Respiratory:   O2 Device: BIPAP  Chronic home O2 use?: YES  Incentive spirometer at bedside: NO       GI:  Last Bowel Movement Date: 02/09/23  Current diet:  ADULT DIET Regular; Low Sodium (2 gm); Mashed potatoes with every lunch and dinner  ADULT ORAL NUTRITION SUPPLEMENT Breakfast; Standard 4 oz  ADULT ORAL NUTRITION SUPPLEMENT Lunch; Fortified Pudding  ADULT ORAL NUTRITION SUPPLEMENT Dinner; Frozen Supplement  DIET ONE TIME MESSAGE  Passing flatus: YES  Tolerating current diet: YES       Pain Management:   Patient states pain is manageable on current regimen: YES    Skin:  Gaston Score: 13  Interventions: turn team, increase time out of bed, and PT/OT consult    Patient Safety:  Fall Score:  Total Score: 2  Interventions: bed/chair alarm, assistive device (walker, cane, etc), gripper socks, and pt to call before getting OOB       Length of Stay:  Expected LOS: 2d 16h  Actual LOS: 92 Thomas Calderón, RN

## 2023-02-09 NOTE — PROGRESS NOTES
Hospitalist Progress Note    NAME: Ritika Piña   :  1960   MRN:  [id number]       Assessment / Plan:    Acute on chronic hypoxic respiratory failure POA  COPD-- end stage/Poor prognosis,   JOSLYN compliant with CPAP  Diastolic heart failure  Patient was on 4-6 L supplemental nasal cannula oxygen at home and BiPAP at night time. Patient oxygen requirements have increased. Went up to FiO2 50 % on BiPAP. Saturating 85% on 6 L NC oxygen. Will attribute this to anxiety more than anything else. Patient did not opt for hospice. Will re-consult pulmonology for any further recommendations.   Patient had to be placed back on BiPAP yesterday due to respiratory distress  When seen this morning resting on BiPAP, will try to wean it off during the day and switch to high flow  Patient is currently on Lasix  Repeat chest x-ray. Showed no acute changes     Patient was on BiPAP the whole day, still short of breath and tachypneic  We will try to transition off to nasal cannula  : Remains on BiPAP during the day, unable to wean off, becomes hypoxic when tried to transition to high flow. Patient is not interested in going home with hospice  Hospice consult  placed    Septic shock  MSSA bacteremia  Leukocytosis  - ID recommended discharge on daptomycin  - Outpatient follow-up with ID    Left femoral neck fracture  - No intervention per ortho  - Outpatient follow up with ortho     Acute on chronic anemia:  GI following, high risk for Endoscopy, colonscopy  Occult stool negative. No signs of active bleeding  GI performed EGD. High risk for colonoscopy. Signed off.   Hematology following   Has folate and iron deficiency, started on iv folic acid and iv iron last week  S/p transfusions  S/p EGD  by GI- found duodenitis/gastritis - recommends to cont PPI BID + carafate  Outpatient follow-up with hematology and GI     Hb 6.9 , PRBC ordered by heme-onc, appreciate help  :  Repeat hemoglobin 8.7, persistent leukocytosis  Erythematous rash- persistent  S/p Pencillin and cephalosporins  Patient is now on daptomycin  Type II DM:  Continue lispro sliding scale     Paroxysmal afib  CAD:  Continue with metoprolol, hold statin while  Continue xarelto on discharge if hemoglobin stays stable  Aspirin discontinued given duodenitis and gastritis     Severe obesity  BMI 51  Complicates overall care    Acute kindey Injury:  Creatinine 1.17, on IV fluid, creatinine continues to trend up  Lasix on hold due to rising creatinine  Patient reported not eating much    40 or above Morbid obesity / Body mass index is 43.22 kg/m². Estimated discharge date: TBD   Barriers: Increasing oxygen requirements, still on BiPAP, patient interested in going home with hospice  Updated patient  daughter , patient answered on 02/09  Code status: DNR  Prophylaxis: Lovenox  Recommended Disposition: Home w/Family     Subjective: Follow-up acute hypoxic respiratory failure, COPD  Much improvement  Still feeling short of breath  Objective:     VITALS:   Last 24hrs VS reviewed since prior progress note.  Most recent are:  Patient Vitals for the past 24 hrs:   Temp Pulse Resp BP SpO2   02/09/23 1200 -- 61 -- -- --   02/09/23 1137 97.6 °F (36.4 °C) 62 24 (!) 120/54 91 %   02/09/23 0924 -- -- -- -- 90 %   02/09/23 0900 -- 75 -- (!) 115/55 --   02/09/23 0800 -- 62 -- -- --   02/09/23 0723 97.8 °F (36.6 °C) 63 28 130/63 98 %   02/09/23 0713 -- -- -- -- 97 %   02/09/23 0400 98 °F (36.7 °C) (!) 59 26 (!) 104/50 90 %   02/09/23 0257 -- -- -- -- 93 %   02/09/23 0000 98.2 °F (36.8 °C) (!) 58 (!) 33 (!) 105/48 91 %   02/08/23 2006 -- -- -- -- 96 %   02/08/23 2000 98.4 °F (36.9 °C) 72 29 (!) 115/56 94 %   02/08/23 1751 -- 73 -- 105/75 --   02/08/23 1628 -- -- -- -- 95 %   02/08/23 1600 98.3 °F (36.8 °C) 61 27 (!) 114/53 92 %       No intake or output data in the 24 hours ending 02/09/23 1359       I had a face to face encounter and independently examined this patient on 2/9/2023, as outlined below:  PHYSICAL EXAM:  General:          Alert, cooperative, no acute distress    EENT:              EOMI. Anicteric sclerae. Resp:               CTA bilaterally, no wheezing or rales. No accessory muscle use  CV:                  Regular  rhythm,  No edema  GI:                   Soft, Non distended, Non tender. Neurologic:       Alert and oriented X 3, normal speech  Psych:   Good insight. Not anxious nor agitated  Skin:                Erythematous rash all over the body. No jaundice    Reviewed most current lab test results and cultures  YES  Reviewed most current radiology test results   YES  Review and summation of old records today    NO  Reviewed patient's current orders and MAR    YES  PMH/SH reviewed - no change compared to H&P  ________________________________________________________________________  Care Plan discussed with:    Comments   Patient [address] [facility] [address]    Consultant                       Y Multidiciplinary team rounds were held today with , nursing, pharmacist and clinical coordinator. Patient's plan of care was discussed; medications were reviewed and discharge planning was addressed. Khoi Saez MD     Procedures: see electronic medical records for all procedures/Xrays and details which were not copied into this note but were reviewed prior to creation of Plan. LABS:  I reviewed today's most current labs and imaging studies.   Pertinent labs include:  Recent Labs     02/09/23 0219 02/08/23 0252 02/07/23  0515   WBC 12.4* 18.9* 20.7*   HGB 8.8* 8.7* 6.9*   HCT 29.6* 28.7* 23.8*    171 165       Recent Labs     02/09/23 0219 02/08/23  0252 02/07/23  0515    134* 134*   K 4.2 4.0 4.1   CL 97 95* 95*   CO2 34* 34* 36*   GLU 67 81 96   BUN 18 15 13   CREA 1.26* 1.22* 1.17*   CA 7.7* 8.0* 8.1*   ALB 1.8* 1.8* 1.8*   TBILI 1.6* 1.2* 1.3*   ALT 22 24 24         Signed: Ralph Montejo, MD

## 2023-02-09 NOTE — PROGRESS NOTES
Palliative Medicine Consult  Miguel A: [phone])    Patient Name: Fuentes Ortega  YOB: 1960    Today's date:  2/3/23  Date of Initial Consult: 1/25/23  Reason for Consult: End Stage Disease  Requesting Provider: Karis Ku MD   Primary Care Physician: Seferino Carter MD     SUMMARY:   Fuentes Ortega is a 60 y.o. with a past history of morbid obesity, DM2, CHF,  bed bound for 2 years, on 4 L of O2, COPD, CHF, morbid obesity, p. Afib, chronic pain, nonambulatory at baseline, JOSLYN compliant with CPAP, h/o duodenal ulcers, who was admitted on 1/21/2023 from home with a diagnosis of symptomatic anemia. HPI:  pt was sent to ED by her PCP for low h/h. Pt reports feeling fatigued but denies CP, SOB, no other symptoms, no bleeding in stool or urine. Admission course: hgb was 5.2 on admission that improved to 7.0 following 1 unit prbcs, hypotensive. 1/22: underwent avulsion of toenail off right great toe. Current medical issues leading to Palliative Medicine involvement include: poor prognosis. 1/24: continues no bleeding and negative fecal occult blood test, ASA-4 very high risk, if she has cancer, not a candidate for aggressive measures. 1/25: RRT called this am for respiratory failure, on HFNC02 at 15 liters, due to C02>130, Pa02 70s, Sp02 91%, bipap started, transferred to ICU. Blood cultures positive for staph aureus, abx will be started today. 3 units prbcs given during this admission so far.  1/27: left femoral neck fracture, per ortho pt not a candidate for surgical interventions. 1/29: 07:31am: CODE called after transitioning from BiPAP to NC02, went into v-tach with quick, spontaneous recovery without intervention. 16:22pm:   CODE for no pulse/v-tach and unresponsive, CPR x 10 compressions upon which pt awoke and pushed RN off her. Pt changed her code status to DNR.  1/30: no further arrhythmias. Still weaning levophed. Receiving PRBCs. CT of pelvis:  1.   No CT evidence of acute osteomyelitis. 2.  Chronic nonunited left femoral neck fracture. Adjacent moderate hip joint osteoarthritis with large complex joint effusion. 3.  Severe right hip joint osteophytosis with small complex joint effusion. 4.  Small volume ascites. Anasarca. 1/31: prbcs for hgb 6.5.  developed rash overnight. 2/1 :  EGD  revealed  gastritis and spoke like radial erythema in antrum, biopsies pending. Hgb stable, 7.3. last transfusion  1/31/2023. Unsuccessful attempt to aspirate fluid from hip. 2/3:  lying in bed, no complaints. 2/9: on bipap, per pt she has been told that it is doubtful her condition is going to improve much more than it is now. Needs YANNA but high risk as she would need to be intubated, ongoing GIB not a candidate for invasive procedures (? capsule study) has required 2 transfusions of prbcs over the past 24 hours,  hip fracture (chronic), pending aspiration of left hip effusion. Psychosocial: bed bound for 3 years. Home 02 3-4L. Lives with dtr and requires assists with ADLs/IADLs   PALLIATIVE DIAGNOSES:   Acute on chronic Anemia, suspect UGIB (stool neg for occult blood), history of PUD on EGD June 2022,  occult positive  Hypotension due to MSSA bacteremia   Chronic hypoxic respiratory failure, COPD, JOSLYN on CPAP  Paroxysmal afib  Severe Diastolic HF  Edema   Wounds  Right great toenail falling off due to a cat scratch   Left hip pain: XR left hip: 1. Displaced, rotated, left femoral neck fracture. 2. Osteoporosis, new from 2014. 3. End-stage right hip osteoarthritis, new from 2014. .  V-fib in setting of profound hypokalemia  Care decisions     PLAN:   Prior to visit, I completed a review of patient's medical records, including medical documentation, vital signs, MARs, and results of various labs and other diagnostics. I also spoke with Dr. Meek Piña and Kianna Gibbs. Met with pt: she asked that I call her dtr Jacob Lockhart; they are interested in taking pt home with hospice.  We talked about how pt will need to tolerate some time on regular 02, 8-10L max to go home. If she cannot tolerate any amount time off bipap then she will most likely die here in the hospital during the time that we wean the 02. Hospice consult ordered. Will add morphine for SOB. Will continue to follow for symptom management during this admission until she is admitted to hospice. Initial consult note routed to primary continuity provider and/or primary health care team members  Communicated plan of care with: Palliative Maribel WHALEN [facility] Team     GOALS OF CARE / TREATMENT PREFERENCES:     GOALS OF CARE:  Patient/Health Care Proxy Stated Goals: Prolong life    TREATMENT PREFERENCES:   Code Status: DNR      Advance Care Planning:  [x] [facility] Interdisciplinary Team has updated the ACP Navigator with Health Care Decision Maker and Patient Capacity      Primary Decision Maker: Rafael Vazquez - [phone]    Secondary Decision Maker: Chey Chavez [facility] - [phone]  Advance Care Planning 1/25/2023   Patient's Healthcare Decision Maker is: Named in scanned ACP document   Confirm Advance Directive Yes, on file   Does the patient have other document types MOST/MOLST/POST/POLST       Medical Interventions: Full interventions       Other:    As far as possible, the palliative care team has discussed with patient / health care proxy about goals of care / treatment preferences for patient.      HISTORY:     History obtained from: chart     The patient is:   [x] Verbal and participatory  with difficulty due to bipap mask  [] Non-participatory due to:        Clinical Pain Assessment (nonverbal scale for severity on nonverbal patients):   Clinical Pain Assessment  Severity: 0     Activity (Movement): Restless, excessive activity and/or withdrawal reflexes    Duration: for how long has pt been experiencing pain (e.g., 2 days, 1 month, years)  Frequency: how often pain is an issue (e.g., several times per day, once every few days, constant)     FUNCTIONAL ASSESSMENT:     Palliative Performance Scale (PPS):  PPS: 20       PSYCHOSOCIAL/SPIRITUAL SCREENING:     Palliative IDT has assessed this patient for cultural preferences / practices and a referral made as appropriate to needs (Cultural Services, Patient Advocacy, Ethics, etc.)    Any spiritual / Episcopal concerns:  [] Yes /  [x] No   If \"Yes\" to discuss with pastoral care during IDT     Does caregiver feel burdened by caring for their loved one:   [] Yes /  [x] No /  [] No Caregiver Present    If \"Yes\" to discuss with social work during IDT    Anticipatory grief assessment:   [x] Normal  / [] Maladaptive     If \"Maladaptive\" to discuss with social work during IDT    ESAS Anxiety: Anxiety: 2    ESAS Depression: Depression: 3         REVIEW OF SYSTEMS:     Positive and pertinent negative findings in ROS are noted above in HPI. The following systems were [x] reviewed / [] unable to be reviewed as noted in HPI  Other findings are noted below. Systems: constitutional, ears/nose/mouth/throat, respiratory, gastrointestinal, genitourinary, musculoskeletal, integumentary, neurologic, psychiatric, endocrine. Positive findings noted below. Modified ESAS Completed by: provider   Fatigue: 8 Drowsiness: 2   Depression: 3 Pain: 0   Anxiety: 2 Nausea: 0   Anorexia: 10 Dyspnea: 4           Stool Occurrence(s): 1        PHYSICAL EXAM:     From RN flowsheet:  Wt Readings from Last 3 Encounters:   02/06/23 244 lb (110.7 kg)   06/15/22 301 lb 9.6 oz (136.8 kg)   05/16/22 298 lb 11.6 oz (135.5 kg)     Blood pressure (!) 120/54, pulse 61, temperature 97.6 °F (36.4 °C), resp. rate 24, height 5' 3\" (1.6 m), weight 244 lb (110.7 kg), SpO2 91 %, not currently breastfeeding.     Pain Scale 1: Numeric (0 - 10)  Pain Intensity 1: 0  Pain Onset 1: chronis  Pain Location 1: Hip  Pain Orientation 1: Left, Right  Pain Description 1: Aching  Pain Intervention(s) 1: Medication (see MAR)  Last bowel movement, if known:     Constitutional: aao x3, on bipap  Eyes: pupils equal, anicteric  ENMT: no nasal discharge, dry mucous membranes   Cardiovascular: regular rhythm, distal pulses intact  Respiratory: breathing not labored, symmetric  Gastrointestinal: morbidly obese, soft non-tender, +bowel sounds  Musculoskeletal: no deformity, no tenderness to palpation  Skin: warm, dry, dressing around right forefoot  Neurologic: following  commands, moving all extremities  Psychiatric: depressed affect       HISTORY:     Active Problems:    Symptomatic anemia (1/22/2023)    Past Medical History:   Diagnosis Date    Acute on chronic respiratory failure with hypoxia and hypercapnia (HCC) 11/20/2020    Atrial fibrillation ([facility] 11/4/2020    CHF (congestive heart failure) ([facility]     Diabetes ([facility]     Hypertension     Ill-defined condition     high cholesterol    Ill-defined condition     tachycardia    JOSLYN (obstructive sleep apnea) 11/20/2020    Other and unspecified symptoms and signs involving general sensations and perceptions     ruptured disc    S/P cardiac cath 11/4/2020    11/3/2020 nonobstructive disease      Past Surgical History:   Procedure Laterality Date    NV COMPRE ELECTROPHYSIOL XM W/LEFT ATRIAL PACNG/REC N/A 11/23/2020    Lt Atrial Pace & Record During Ep Study performed by Yordan Gibbs MD at [facility] CARDIAC CATH LAB    NV COMPRE EP EVAL ABLTJ 3D MAPG TX SVT N/A 11/23/2020    ABLATION A-FLUTTER performed by Yordan Gibbs MD at [facility] CARDIAC CATH LAB    NV ICAR CATHETER ABLATION ARRHYTHMIA ADD ON N/A 11/23/2020    Ablation Svt/Vt Add On performed by Yordan Gibbs MD at [facility] CARDIAC CATH LAB    NV INTRACARDIAC ELECTROPHYSIOLOGIC 3D MAPPING N/A 11/23/2020    Ep 3d Mapping performed by Yordan Gibbs MD at [facility] CARDIAC CATH LAB    NV PROGRAMMED STIMJ & PACG AFTER IV DRUG NFS N/A 11/23/2020    Drug Stimulation performed by Yordan Gibbs MD at [facility] CARDIAC CATH LAB      History reviewed. No pertinent family history. History reviewed, no pertinent family history. Social History     Tobacco Use    Smoking status: Former    Smokeless tobacco: Never   Substance Use Topics    Alcohol use: No     Allergies   Allergen Reactions    Cefepime Rash     tolerated nafcillin 1/2023    Nafcillin Rash    Cefazolin Rash    Other Plant, Animal, Environmental Rash     Ivory soap caused rash on hands.     Zosyn [Piperacillin-Tazobactam] Rash     Possible rash to zosyn, also received IV contrast and blood  Tolerated nafcillin before/after 1/2023      Current Facility-Administered Medications   Medication Dose Route Frequency    morphine (ROXANOL) 100 mg/5 mL (20 mg/mL) concentrated solution 5 mg  5 mg Oral Q3H PRN    0.9% sodium chloride infusion 250 mL  250 mL IntraVENous PRN    acetaminophen (TYLENOL) tablet 650 mg  650 mg Oral Q4H PRN    diphenhydrAMINE (BENADRYL) capsule 25 mg  25 mg Oral Q6H PRN    DAPTOmycin (CUBICIN) 1,000 mg in 0.9% sodium chloride 50 mL IVPB  1,000 mg IntraVENous Q24H    0.9% sodium chloride infusion  100 mL/hr IntraVENous CONTINUOUS    nystatin (MYCOSTATIN) 100,000 unit/gram powder   Topical BID    sucralfate (CARAFATE) tablet 1 g  1 g Oral AC&HS    camphor-menthoL (SARNA) 0.5-0.5 % lotion   Topical Q12H    albuterol-ipratropium (DUO-NEB) 2.5 MG-0.5 MG/3 ML  3 mL Nebulization Q6H RT    L.acidophilus-paracasei-S.thermophil-bifidobacter (RISAQUAD) 8 billion cell capsule  1 Capsule Oral DAILY    potassium bicarb-citric acid (EFFER-K) tablet 20 mEq  20 mEq Oral DAILY    folic acid (FOLVITE) 1 mg in 0.9% sodium chloride 50 mL ivpb   IntraVENous DAILY    [Held by provider] furosemide (LASIX) tablet 40 mg  40 mg Oral DAILY    midodrine (PROAMATINE) tablet 10 mg  10 mg Oral TID WITH MEALS    enoxaparin (LOVENOX) injection 30 mg  30 mg SubCUTAneous Q12H    alcohol 62% (NOZIN) nasal  1 Ampule  1 Ampule Topical Q12H    insulin lispro (HUMALOG) injection   SubCUTAneous Q6H    glucose chewable tablet 16 g  4 Tablet Oral PRN    glucagon (GLUCAGEN) injection 1 mg  1 mg IntraMUSCular PRN    metoprolol tartrate (LOPRESSOR) tablet 12.5 mg  12.5 mg Oral BID    [Held by provider] atorvastatin (LIPITOR) tablet 10 mg  10 mg Oral QHS    pantoprazole (PROTONIX) tablet 40 mg  40 mg Oral ACB&D    sodium chloride (NS) flush 5-40 mL  5-40 mL IntraVENous Q8H    sodium chloride (NS) flush 5-40 mL  5-40 mL IntraVENous PRN    acetaminophen (TYLENOL) tablet 650 mg  650 mg Oral Q6H PRN    Or    acetaminophen (TYLENOL) suppository 650 mg  650 mg Rectal Q6H PRN    polyethylene glycol (MIRALAX) packet 17 g  17 g Oral DAILY PRN    ondansetron (ZOFRAN ODT) tablet 4 mg  4 mg Oral Q8H PRN    Or    ondansetron (ZOFRAN) injection 4 mg  4 mg IntraVENous Q6H PRN    dextrose 10% infusion 0-250 mL  0-250 mL IntraVENous PRN    balsam peru-castor oiL (VENELEX) ointment   Topical BID          LAB AND IMAGING FINDINGS:     Lab Results   Component Value Date/Time    WBC 12.4 (H) 02/09/2023 02:19 AM    HGB 8.8 (L) 02/09/2023 02:19 AM    PLATELET 458 77/05/6736 02:19 AM     Lab Results   Component Value Date/Time    Sodium 137 02/09/2023 02:19 AM    Potassium 4.2 02/09/2023 02:19 AM    Chloride 97 02/09/2023 02:19 AM    CO2 34 (H) 02/09/2023 02:19 AM    BUN 18 02/09/2023 02:19 AM    Creatinine 1.26 (H) 02/09/2023 02:19 AM    Calcium 7.7 (L) 02/09/2023 02:19 AM    Magnesium 2.3 02/01/2023 05:24 AM    Phosphorus 2.1 (L) 02/01/2023 05:24 AM      Lab Results   Component Value Date/Time    Alk.  phosphatase 104 02/09/2023 02:19 AM    Protein, total 6.5 02/09/2023 02:19 AM    Albumin 1.8 (L) 02/09/2023 02:19 AM    Globulin 4.7 (H) 02/09/2023 02:19 AM     Lab Results   Component Value Date/Time    INR 1.1 02/01/2023 03:30 AM    Prothrombin time 11.9 (H) 02/01/2023 03:30 AM    aPTT 29.9 02/01/2023 03:30 AM      Lab Results   Component Value Date/Time    Iron 31 (L) 01/23/2023 02:09 PM    TIBC 328 01/23/2023 02:09 PM    Iron % saturation 9 (L) 01/23/2023 02:09 PM    Ferritin 32 01/23/2023 02:09 PM      Lab Results   Component Value Date/Time    pH 7.35 02/09/2023 11:26 AM    PCO2 59 (H) 02/09/2023 11:26 AM    PO2 62 (L) 02/09/2023 11:26 AM     No components found for: [place]   Lab Results   Component Value Date/Time    CK 21 (L) 02/05/2023 03:37 PM    CK - MB <1.0 11/01/2020 04:33 AM                Total time: 45  Counseling / coordination time, spent as noted above: 35  > 50% counseling / coordination?: y    Prolonged service was provided for  []30 min   []75 min in face to face time in the presence of the patient, spent as noted above. Time Start:   Time End:   Note: this can only be billed with 22653 (initial) or 46859 (follow up). If multiple start / stop times, list each separately.

## 2023-02-09 NOTE — PROGRESS NOTES
Hematology Oncology Progress Note    Follow up for: anemia    Chart notes reviewed since last visit. Case discussed with following: Gabrielle from Palliative Care. Patient complains of the following:  \"I can't breathe. \" Does not feel well. Looks increasingly depressed     Additional concerns noted by the staff:  not eating or drinking well. Patient Vitals for the past 24 hrs:   BP Temp Pulse Resp SpO2   02/09/23 1200 -- -- 61 -- --   02/09/23 1137 (!) 120/54 97.6 °F (36.4 °C) 62 24 91 %   02/09/23 0924 -- -- -- -- 90 %   02/09/23 0900 (!) 115/55 -- 75 -- --   02/09/23 0800 -- -- 62 -- --   02/09/23 0723 130/63 97.8 °F (36.6 °C) 63 28 98 %   02/09/23 0713 -- -- -- -- 97 %   02/09/23 0400 (!) 104/50 98 °F (36.7 °C) (!) 59 26 90 %   02/09/23 0257 -- -- -- -- 93 %   02/09/23 0000 (!) 105/48 98.2 °F (36.8 °C) (!) 58 (!) 33 91 %   02/08/23 2006 -- -- -- -- 96 %   02/08/23 2000 (!) 115/56 98.4 °F (36.9 °C) 72 29 94 %   02/08/23 1751 105/75 -- 73 -- --   02/08/23 1628 -- -- -- -- 95 %   02/08/23 1600 (!) 114/53 98.3 °F (36.8 °C) 61 27 92 %         Physical Examination:  General: on BiPap and tachypneic. O2 sat at 96% on monitor. HEENT: NC, AT   Neck: supple  Resp: tachypneic distant anteriorly  Abd Obese, + BS, size would preclude accurate assessment of liver and spleen size  Skin: bright erythematous macular rash on chest and b/l UE volar surfaces, progressed on chest with dry desquamation noted. Ext 1-2+ pitting edema b/l . Feet quite puffy  Neuro: able to sqeeze my hands bilaterally with good . Says no movement in legs. Psych: alert, conversant.        Labs:  Recent Results (from the past 24 hour(s))   GLUCOSE, POC    Collection Time: 02/08/23  5:54 PM   Result Value Ref Range    Glucose (POC) 82 65 - 117 mg/dL    Performed by Gabriella LAU    GLUCOSE, POC    Collection Time: 02/08/23 11:30 PM   Result Value Ref Range    Glucose (POC) 81 65 - 117 mg/dL    Performed by Aishwarya Garrett RN    CBC WITH AUTOMATED DIFF    Collection Time: 02/09/23  2:19 AM   Result Value Ref Range    WBC 12.4 (H) 3.6 - 11.0 K/uL    RBC 2.87 (L) 3.80 - 5.20 M/uL    HGB 8.8 (L) 11.5 - 16.0 g/dL    HCT 29.6 (L) 35.0 - 47.0 %    .1 (H) 80.0 - 99.0 FL    MCH 30.7 26.0 - 34.0 PG    MCHC 29.7 (L) 30.0 - 36.5 g/dL    RDW 27.0 (H) 11.5 - 14.5 %    PLATELET 083 864 - 623 K/uL    MPV 11.9 8.9 - 12.9 FL    NRBC 0.6 (H) 0  WBC    ABSOLUTE NRBC 0.08 (H) 0.00 - 0.01 K/uL    NEUTROPHILS 87 (H) 32 - 75 %    LYMPHOCYTES 7 (L) 12 - 49 %    MONOCYTES 4 (L) 5 - 13 %    EOSINOPHILS 1 0 - 7 %    BASOPHILS 0 0 - 1 %    IMMATURE GRANULOCYTES 1 (H) 0.0 - 0.5 %    ABS. NEUTROPHILS 10.8 (H) 1.8 - 8.0 K/UL    ABS. LYMPHOCYTES 0.9 0.8 - 3.5 K/UL    ABS. MONOCYTES 0.5 0.0 - 1.0 K/UL    ABS. EOSINOPHILS 0.1 0.0 - 0.4 K/UL    ABS. BASOPHILS 0.0 0.0 - 0.1 K/UL    ABS. IMM. GRANS. 0.1 (H) 0.00 - 0.04 K/UL    DF SMEAR SCANNED      PLATELET COMMENTS Large Platelets      RBC COMMENTS ANISOCYTOSIS  3+        RBC COMMENTS MACROCYTOSIS  1+       METABOLIC PANEL, COMPREHENSIVE    Collection Time: 02/09/23  2:19 AM   Result Value Ref Range    Sodium 137 136 - 145 mmol/L    Potassium 4.2 3.5 - 5.1 mmol/L    Chloride 97 97 - 108 mmol/L    CO2 34 (H) 21 - 32 mmol/L    Anion gap 6 5 - 15 mmol/L    Glucose 67 65 - 100 mg/dL    BUN 18 6 - 20 MG/DL    Creatinine 1.26 (H) 0.55 - 1.02 MG/DL    BUN/Creatinine ratio 14 12 - 20      eGFR 48 (L) >60 ml/min/1.73m2    Calcium 7.7 (L) 8.5 - 10.1 MG/DL    Bilirubin, total 1.6 (H) 0.2 - 1.0 MG/DL    ALT (SGPT) 22 12 - 78 U/L    AST (SGOT) 41 (H) 15 - 37 U/L    Alk.  phosphatase 104 45 - 117 U/L    Protein, total 6.5 6.4 - 8.2 g/dL    Albumin 1.8 (L) 3.5 - 5.0 g/dL    Globulin 4.7 (H) 2.0 - 4.0 g/dL    A-G Ratio 0.4 (L) 1.1 - 2.2     GLUCOSE, POC    Collection Time: 02/09/23  7:55 AM   Result Value Ref Range    Glucose (POC) 78 65 - 117 mg/dL    Performed by MARQUITA Mcdowell 52, ARTERIAL    Collection Time: 02/09/23 11:26 AM   Result Value Ref Range    pH 7.35 7.35 - 7.45      PCO2 59 (H) 35 - 45 mmHg    PO2 62 (L) 80 - 100 mmHg    O2 SAT 90 (L) 92 - 97 %    BICARBONATE 32 (H) 22 - 26 mmol/L    BASE EXCESS 4.2 mmol/L    O2 METHOD BIPAP      FIO2 50 %    SPONTANEOUS RATE 32      Sample source ARTERIAL      SITE RIGHT RADIAL      ELIA'S TEST YES     GLUCOSE, POC    Collection Time: 02/09/23 11:30 AM   Result Value Ref Range    Glucose (POC) 90 65 - 117 mg/dL    Performed by Bridget Rojo       EGD 2/1/23 Dr Pati Diallo:  Findings:      Esophagus: The esophageal mucosa in the proximal, mid and distal esophagus is normal.   The squamo-columnar junction is at 44 cm where the Z-line was noted. Stomach: The gastric mucosa has moderately diffuse erythema compatible with gastritis in the body. Multiple biopsies taken. Antrum has spoke-like radial erythema(This does not look like GAVE though): biopsies taken  The fundus was found to be normal with no lesions noted on retroflexion. The angularis is normal as well. Duodenum:   The bulbar mucosa has patchy erythema with a few erosions. Biopsies obtained. The duodenal folds are normal in D2. No blood seen in the upper GI tract. Therapies:  biopsy of stomach body, antrum  biopsy of duodenal distal bulb    Assessment and Plan:   *) Anemia:  - folate and iron deficiency, started on folic acid and IV iron given last week. - transfuse for HBG < 7  - Hgb 8.8  today after transfusion 2/7/23  - PRBC given 1/30 and 1/31 and 8/3/17  - Switched folic acid to IV on 2/43- to ensure she is able to absorb supplement, she was still macrocytic and anemia not responding well to prbc, has some diarrhea issues making absorption questionable. .  - Labs repeated 1/31 ;and still not showing any hemolysis or DIC.    *) Thrombocytopenia- resolved:  - DDX: Drug related, infection, No DIC  - HIT ab negative, plt improved. *) Resp distress  -requires bipap at present.  Efforts to wean down underway. *) Sepsis  - Weaned off Levophed. . On midodrine    *) Rash:   - likely drug rash?, switched zosyn to nafillin and now on Vancomycin  - Rash appeared 1/30 and received zosyn, IV KCL  and blood that day.   - Rash looks a little less erythematous today - more dry desquamation  - No fever

## 2023-02-10 PROBLEM — J96.90 RESPIRATORY FAILURE (HCC): Status: ACTIVE | Noted: 2023-02-10

## 2023-02-10 LAB
ALBUMIN SERPL-MCNC: 1.9 G/DL (ref 3.5–5)
ALBUMIN/GLOB SERPL: 0.4 (ref 1.1–2.2)
ALP SERPL-CCNC: 113 U/L (ref 45–117)
ALT SERPL-CCNC: 24 U/L (ref 12–78)
ANION GAP SERPL CALC-SCNC: 4 MMOL/L (ref 5–15)
AST SERPL-CCNC: 50 U/L (ref 15–37)
BILIRUB SERPL-MCNC: 1.2 MG/DL (ref 0.2–1)
BUN SERPL-MCNC: 18 MG/DL (ref 6–20)
BUN/CREAT SERPL: 16 (ref 12–20)
CALCIUM SERPL-MCNC: 7.9 MG/DL (ref 8.5–10.1)
CHLORIDE SERPL-SCNC: 98 MMOL/L (ref 97–108)
CO2 SERPL-SCNC: 34 MMOL/L (ref 21–32)
CREAT SERPL-MCNC: 1.16 MG/DL (ref 0.55–1.02)
ERYTHROCYTE [DISTWIDTH] IN BLOOD BY AUTOMATED COUNT: 26.3 % (ref 11.5–14.5)
GLOBULIN SER CALC-MCNC: 5.1 G/DL (ref 2–4)
GLUCOSE BLD STRIP.AUTO-MCNC: 101 MG/DL (ref 65–117)
GLUCOSE BLD STRIP.AUTO-MCNC: 110 MG/DL (ref 65–117)
GLUCOSE SERPL-MCNC: 94 MG/DL (ref 65–100)
HCT VFR BLD AUTO: 32.8 % (ref 35–47)
HGB BLD-MCNC: 9.4 G/DL (ref 11.5–16)
MCH RBC QN AUTO: 30.4 PG (ref 26–34)
MCHC RBC AUTO-ENTMCNC: 28.7 G/DL (ref 30–36.5)
MCV RBC AUTO: 106.1 FL (ref 80–99)
NRBC # BLD: 0.06 K/UL (ref 0–0.01)
NRBC BLD-RTO: 0.5 PER 100 WBC
PLATELET # BLD AUTO: 209 K/UL (ref 150–400)
PMV BLD AUTO: 12.4 FL (ref 8.9–12.9)
POTASSIUM SERPL-SCNC: 4.5 MMOL/L (ref 3.5–5.1)
PROCALCITONIN SERPL-MCNC: 0.34 NG/ML
PROT SERPL-MCNC: 7 G/DL (ref 6.4–8.2)
RBC # BLD AUTO: 3.09 M/UL (ref 3.8–5.2)
SERVICE CMNT-IMP: NORMAL
SERVICE CMNT-IMP: NORMAL
SODIUM SERPL-SCNC: 136 MMOL/L (ref 136–145)
WBC # BLD AUTO: 12.1 K/UL (ref 3.6–11)

## 2023-02-10 PROCEDURE — 74011250637 HC RX REV CODE- 250/637: Performed by: STUDENT IN AN ORGANIZED HEALTH CARE EDUCATION/TRAINING PROGRAM

## 2023-02-10 PROCEDURE — 80053 COMPREHEN METABOLIC PANEL: CPT

## 2023-02-10 PROCEDURE — 74011250637 HC RX REV CODE- 250/637: Performed by: INTERNAL MEDICINE

## 2023-02-10 PROCEDURE — 36415 COLL VENOUS BLD VENIPUNCTURE: CPT

## 2023-02-10 PROCEDURE — 74011000250 HC RX REV CODE- 250: Performed by: PHYSICIAN ASSISTANT

## 2023-02-10 PROCEDURE — 74011000250 HC RX REV CODE- 250: Performed by: INTERNAL MEDICINE

## 2023-02-10 PROCEDURE — 74011000250 HC RX REV CODE- 250: Performed by: STUDENT IN AN ORGANIZED HEALTH CARE EDUCATION/TRAINING PROGRAM

## 2023-02-10 PROCEDURE — 94660 CPAP INITIATION&MGMT: CPT

## 2023-02-10 PROCEDURE — 82962 GLUCOSE BLOOD TEST: CPT

## 2023-02-10 PROCEDURE — 74011250636 HC RX REV CODE- 250/636: Performed by: INTERNAL MEDICINE

## 2023-02-10 PROCEDURE — 94640 AIRWAY INHALATION TREATMENT: CPT

## 2023-02-10 PROCEDURE — 74011000258 HC RX REV CODE- 258: Performed by: INTERNAL MEDICINE

## 2023-02-10 PROCEDURE — 74011250637 HC RX REV CODE- 250/637: Performed by: NURSE PRACTITIONER

## 2023-02-10 PROCEDURE — 85027 COMPLETE CBC AUTOMATED: CPT

## 2023-02-10 PROCEDURE — 74011250636 HC RX REV CODE- 250/636: Performed by: STUDENT IN AN ORGANIZED HEALTH CARE EDUCATION/TRAINING PROGRAM

## 2023-02-10 PROCEDURE — 84145 PROCALCITONIN (PCT): CPT

## 2023-02-10 RX ORDER — IPRATROPIUM BROMIDE AND ALBUTEROL SULFATE 2.5; .5 MG/3ML; MG/3ML
3 SOLUTION RESPIRATORY (INHALATION)
Status: DISCONTINUED | OUTPATIENT
Start: 2023-02-10 | End: 2023-02-10 | Stop reason: HOSPADM

## 2023-02-10 RX ADMIN — MIDODRINE HYDROCHLORIDE 10 MG: 5 TABLET ORAL at 08:52

## 2023-02-10 RX ADMIN — SODIUM CHLORIDE, PRESERVATIVE FREE 10 ML: 5 INJECTION INTRAVENOUS at 15:12

## 2023-02-10 RX ADMIN — SUCRALFATE 1 G: 1 TABLET ORAL at 08:51

## 2023-02-10 RX ADMIN — MORPHINE SULFATE 5 MG: 10 SOLUTION ORAL at 01:45

## 2023-02-10 RX ADMIN — IPRATROPIUM BROMIDE AND ALBUTEROL SULFATE 3 ML: 2.5; .5 SOLUTION RESPIRATORY (INHALATION) at 02:26

## 2023-02-10 RX ADMIN — FOLIC ACID: 5 INJECTION, SOLUTION INTRAMUSCULAR; INTRAVENOUS; SUBCUTANEOUS at 09:02

## 2023-02-10 RX ADMIN — Medication 1 AMPULE: at 09:00

## 2023-02-10 RX ADMIN — CASTOR OIL AND BALSAM, PERU: 788; 87 OINTMENT TOPICAL at 09:00

## 2023-02-10 RX ADMIN — SODIUM CHLORIDE, PRESERVATIVE FREE 10 ML: 5 INJECTION INTRAVENOUS at 05:22

## 2023-02-10 RX ADMIN — IPRATROPIUM BROMIDE AND ALBUTEROL SULFATE 3 ML: 2.5; .5 SOLUTION RESPIRATORY (INHALATION) at 07:12

## 2023-02-10 RX ADMIN — PANTOPRAZOLE SODIUM 40 MG: 40 TABLET, DELAYED RELEASE ORAL at 08:54

## 2023-02-10 RX ADMIN — Medication 1 CAPSULE: at 08:55

## 2023-02-10 RX ADMIN — ENOXAPARIN SODIUM 30 MG: 100 INJECTION SUBCUTANEOUS at 11:57

## 2023-02-10 RX ADMIN — POTASSIUM BICARBONATE 20 MEQ: 391 TABLET, EFFERVESCENT ORAL at 08:49

## 2023-02-10 RX ADMIN — NYSTATIN: 100000 POWDER TOPICAL at 09:01

## 2023-02-10 RX ADMIN — METOPROLOL TARTRATE 12.5 MG: 25 TABLET, FILM COATED ORAL at 08:55

## 2023-02-10 NOTE — PROGRESS NOTES
Hospitalist Progress Note    NAME: Troy Sanz   :  1960   MRN:  [id number]       Assessment / Plan:    Acute on chronic hypoxic respiratory failure POA  COPD-- end stage/Poor prognosis,   JOSLYN compliant with CPAP  Diastolic heart failure  Patient was on 4-6 L supplemental nasal cannula oxygen at home and BiPAP at night time. Patient oxygen requirements have increased. Went up to FiO2 50 % on BiPAP. Saturating 85% on 6 L NC oxygen. Will attribute this to anxiety more than anything else. Patient did not opt for hospice. Will re-consult pulmonology for any further recommendations.   Patient had to be placed back on BiPAP yesterday due to respiratory distress  When seen this morning resting on BiPAP, will try to wean it off during the day and switch to high flow  Patient is currently on Lasix  Repeat chest x-ray. Showed no acute changes     Patient was on BiPAP the whole day, still short of breath and tachypneic  We will try to transition off to nasal cannula  : Remains on BiPAP during the day, unable to wean off, becomes hypoxic when tried to transition to high flow. Patient is not interested in going home with hospice  Hospice consult  placed  02/10: Worsening respiratory failure, unable to be taken off BiPAP, oxygen requirement increasing FiO2 90% patient still saturating around 88%  Seen by hospice,  patient and family  ready for inpatient hospice consulted management      Septic shock  MSSA bacteremia  Leukocytosis  - ID recommended discharge on daptomycin  - Outpatient follow-up with ID    Left femoral neck fracture  - No intervention per ortho  - Outpatient follow up with ortho     Acute on chronic anemia:  GI following, high risk for Endoscopy, colonscopy  Occult stool negative. No signs of active bleeding  GI performed EGD. High risk for colonoscopy. Signed off.   Hematology following   Has folate and iron deficiency, started on iv folic acid and iv iron last week  S/p transfusions  S/p EGD 2/1 by GI- found duodenitis/gastritis - recommends to cont PPI BID + carafate  Outpatient follow-up with hematology and GI   02/07  Hb 6.9 , PRBC ordered by heme-onc, appreciate help  02/08:  Repeat hemoglobin 8.7, persistent leukocytosis  Erythematous rash- persistent  S/p Pencillin and cephalosporins  Patient is now on daptomycin  Type II DM:  Continue lispro sliding scale     Paroxysmal afib  CAD:  Continue with metoprolol, hold statin while  Continue xarelto on discharge if hemoglobin stays stable  Aspirin discontinued given duodenitis and gastritis     Severe obesity  BMI 51  Complicates overall care    Acute kindey Injury:  Creatinine 1.17, on IV fluid, creatinine continues to trend up  Lasix on hold due to rising creatinine  Patient reported not eating much    40 or above Morbid obesity / Body mass index is 43.22 kg/m². Estimated discharge date: TBD   Barriers: Increasing oxygen requirements, still on BiPAP, patient interested in going home with hospice  Updated patient  daughter , patient answered on 02/09  Code status: DNR  Prophylaxis: Lovenox  Recommended Disposition: Home w/Family     Subjective: Follow-up acute hypoxic respiratory failure, COPD  Worsening shortness of breath, remains on BiPAP  Objective:     VITALS:   Last 24hrs VS reviewed since prior progress note.  Most recent are:  Patient Vitals for the past 24 hrs:   Temp Pulse Resp BP SpO2   02/10/23 1142 97 °F (36.1 °C) 65 18 112/69 90 %   02/10/23 1055 -- -- -- -- (!) 89 %   02/10/23 0733 (P) 98.3 °F (36.8 °C) 68 16 (!) 112/49 92 %   02/10/23 0712 -- -- -- -- (!) 88 %   02/10/23 0400 98 °F (36.7 °C) 65 16 (!) 123/59 (!) 89 %   02/10/23 0226 -- -- -- -- (!) 89 %   02/10/23 0150 -- -- -- -- 94 %   02/10/23 0000 98.2 °F (36.8 °C) 61 29 (!) 111/52 (!) 89 %   02/09/23 2345 -- 63 -- -- --   02/09/23 2002 -- -- -- -- 91 %   02/09/23 2000 98.3 °F (36.8 °C) (!) 59 (!) 34 (!) 119/53 91 %   02/09/23 1954 -- 66 -- -- --   02/09/23 1756 -- 65 -- (!) 118/51 --   02/09/23 1600 -- 66 -- -- --   02/09/23 1529 98 °F (36.7 °C) 64 14 (!) 130/55 90 %       No intake or output data in the 24 hours ending 02/10/23 1500       I had a face to face encounter and independently examined this patient on 2/10/2023, as outlined below:  PHYSICAL EXAM:  General:          Alert, cooperative, no acute distress    EENT:              EOMI. Anicteric sclerae. Resp:               CTA bilaterally, no wheezing or rales. No accessory muscle use  CV:                  Regular  rhythm,  No edema  GI:                   Soft, Non distended, Non tender. Neurologic:       Alert and oriented X 3, normal speech  Psych:   Good insight. Not anxious nor agitated  Skin:                Erythematous rash all over the body. No jaundice    Reviewed most current lab test results and cultures  YES  Reviewed most current radiology test results   YES  Review and summation of old records today    NO  Reviewed patient's current orders and MAR    YES  PMH/SH reviewed - no change compared to H&P  ________________________________________________________________________  Care Plan discussed with:    Comments   Patient [address] Y    Consultant                       Y Multidiciplinary team rounds were held today with , nursing, pharmacist and clinical coordinator. Patient's plan of care was discussed; medications were reviewed and discharge planning was addressed. Antonella Brice MD     Procedures: see electronic medical records for all procedures/Xrays and details which were not copied into this note but were reviewed prior to creation of Plan. LABS:  I reviewed today's most current labs and imaging studies.   Pertinent labs include:  Recent Labs     02/10/23  0134 02/09/23 0219 02/08/23 0252   WBC 12.1* 12.4* 18.9*   HGB 9.4* 8.8* 8.7*   HCT 32.8* 29.6* 28.7*    172 171       Recent Labs     02/10/23  0134 02/09/23 0219 02/08/23 0252  137 134*   K 4.5 4.2 4.0   CL 98 97 95*   CO2 34* 34* 34*   GLU 94 67 81   BUN 18 18 15   CREA 1.16* 1.26* 1.22*   CA 7.9* 7.7* 8.0*   ALB 1.9* 1.8* 1.8*   TBILI 1.2* 1.6* 1.2*   ALT 24 22 24         Signed: Faye Hernandez MD

## 2023-02-10 NOTE — PROGRESS NOTES
Respiratory cares;    Per patient's assessment, adjusted patient's BIPAP settings from AVAPS mode, to ST mode 22/10/90%. SATs are 90%, patient  is a DNR/DNI. Notified patient's nurse.

## 2023-02-10 NOTE — PROGRESS NOTES
0700. Bedside shift change report given to Laura (oncoming nurse) by Lexii Morejon RN (offgoing nurse). Report included the following information SBAR, Kardex, ED Summary, MAR, Recent Results, and Cardiac Rhythm NSR .

## 2023-02-10 NOTE — PROGRESS NOTES
Hematology Oncology Progress Note    Follow up for: anemia    Chart notes reviewed since last visit. Case discussed with following: Gabrielle from [facility] yesterday afternoon. Patient complains of the following:  lethargic    Additional concerns noted by the staff:  not eating or drinking well. Gabrielle's note read - pt now interested in possible hospice admit    Patient Vitals for the past 24 hrs:   BP Temp Pulse Resp SpO2   02/10/23 1142 112/69 97 °F (36.1 °C) 65 18 90 %   02/10/23 1055 -- -- -- -- (!) 89 %   02/10/23 0733 (!) 112/49 (P) 98.3 °F (36.8 °C) 68 16 92 %   02/10/23 0712 -- -- -- -- (!) 88 %   02/10/23 0400 (!) 123/59 98 °F (36.7 °C) 65 16 (!) 89 %   02/10/23 0226 -- -- -- -- (!) 89 %   02/10/23 0150 -- -- -- -- 94 %   02/10/23 0000 (!) 111/52 98.2 °F (36.8 °C) 61 29 (!) 89 %   02/09/23 2345 -- -- 63 -- --   02/09/23 2002 -- -- -- -- 91 %   02/09/23 2000 (!) 119/53 98.3 °F (36.8 °C) (!) 59 (!) 34 91 %   02/09/23 1954 -- -- 66 -- --   02/09/23 1756 (!) 118/51 -- 65 -- --   02/09/23 1600 -- -- 66 -- --   02/09/23 1529 (!) 130/55 98 °F (36.7 °C) 64 14 90 %   02/09/23 1420 -- -- -- -- 90 %         Physical Examination:  General: on BiPap and tachypneic. O2 sat at 96% on monitor. HEENT: NC, AT   Neck: supple  Resp: tachypneic distant anteriorly  Abd Obese, + BS, size would preclude accurate assessment of liver and spleen size  Skin: bright erythematous macular rash on chest and b/l UE volar surfaces, progressed on chest with dry desquamation noted. Ext 1-2+ pitting edema b/l .  Feet quite puffy  Neuro: not tested   Psych: lethargic      Labs:  Recent Results (from the past 24 hour(s))   GLUCOSE, POC    Collection Time: 02/09/23  6:10 PM   Result Value Ref Range    Glucose (POC) 82 65 - 117 mg/dL    Performed by Sam Conklin PCT    GLUCOSE, POC    Collection Time: 02/09/23 10:54 PM   Result Value Ref Range    Glucose (POC) 77 65 - 117 mg/dL    Performed by Leroy Morgan RN    PROCALCITONIN Collection Time: 02/10/23  1:34 AM   Result Value Ref Range    Procalcitonin 0.34 ng/mL   CBC W/O DIFF    Collection Time: 02/10/23  1:34 AM   Result Value Ref Range    WBC 12.1 (H) 3.6 - 11.0 K/uL    RBC 3.09 (L) 3.80 - 5.20 M/uL    HGB 9.4 (L) 11.5 - 16.0 g/dL    HCT 32.8 (L) 35.0 - 47.0 %    .1 (H) 80.0 - 99.0 FL    MCH 30.4 26.0 - 34.0 PG    MCHC 28.7 (L) 30.0 - 36.5 g/dL    RDW 26.3 (H) 11.5 - 14.5 %    PLATELET 374 106 - 731 K/uL    MPV 12.4 8.9 - 12.9 FL    NRBC 0.5 (H) 0  WBC    ABSOLUTE NRBC 0.06 (H) 0.00 - 5.29 K/uL   METABOLIC PANEL, COMPREHENSIVE    Collection Time: 02/10/23  1:34 AM   Result Value Ref Range    Sodium 136 136 - 145 mmol/L    Potassium 4.5 3.5 - 5.1 mmol/L    Chloride 98 97 - 108 mmol/L    CO2 34 (H) 21 - 32 mmol/L    Anion gap 4 (L) 5 - 15 mmol/L    Glucose 94 65 - 100 mg/dL    BUN 18 6 - 20 MG/DL    Creatinine 1.16 (H) 0.55 - 1.02 MG/DL    BUN/Creatinine ratio 16 12 - 20      eGFR 53 (L) >60 ml/min/1.73m2    Calcium 7.9 (L) 8.5 - 10.1 MG/DL    Bilirubin, total 1.2 (H) 0.2 - 1.0 MG/DL    ALT (SGPT) 24 12 - 78 U/L    AST (SGOT) 50 (H) 15 - 37 U/L    Alk. phosphatase 113 45 - 117 U/L    Protein, total 7.0 6.4 - 8.2 g/dL    Albumin 1.9 (L) 3.5 - 5.0 g/dL    Globulin 5.1 (H) 2.0 - 4.0 g/dL    A-G Ratio 0.4 (L) 1.1 - 2.2     GLUCOSE, POC    Collection Time: 02/10/23  8:00 AM   Result Value Ref Range    Glucose (POC) 110 65 - 117 mg/dL    Performed by Collin Hernandez    GLUCOSE, POC    Collection Time: 02/10/23 12:06 PM   Result Value Ref Range    Glucose (POC) 101 65 - 117 mg/dL    Performed by Collin Hernandez       EGD 2/1/23 Dr Lily Francis:  Findings:      Esophagus: The esophageal mucosa in the proximal, mid and distal esophagus is normal.   The squamo-columnar junction is at 44 cm where the Z-line was noted. Stomach: The gastric mucosa has moderately diffuse erythema compatible with gastritis in the body. Multiple biopsies taken.    Antrum has spoke-like radial erythema(This does not look like GAVE though): biopsies taken  The fundus was found to be normal with no lesions noted on retroflexion. The angularis is normal as well. Duodenum:   The bulbar mucosa has patchy erythema with a few erosions. Biopsies obtained. The duodenal folds are normal in D2. No blood seen in the upper GI tract. Therapies:  biopsy of stomach body, antrum  biopsy of duodenal distal bulb    Assessment and Plan:   *) Anemia:  - folate and iron deficiency, started on folic acid and IV iron given last week. - transfuse for HBG < 7  - Hgb 8.8  today after transfusion 2/7/23  - PRBC given 1/30 and 1/31 and 8/9/03  - Switched folic acid to IV on 3/45- to ensure she is able to absorb supplement, she was still macrocytic and anemia not responding well to prbc, has some diarrhea issues making absorption questionable. .  - Labs repeated 1/31 ;and still not showing any hemolysis or DIC.    *) Thrombocytopenia- resolved:  - DDX: Drug related, infection, No DIC  - HIT ab negative, plt improved. *) Resp distress  -requires bipap at present. Has not been able to wean. - now [facility] - family meeting with Nkechi later this afternoon    *) Sepsis  - Weaned off Levophed. . On midodrine    *) Rash:   - likely drug rash?, switched zosyn to nafillin and then Vancomycin. Now on daptomycin.   - Rash appeared 1/30 and received zosyn, IV KCL  and blood that day. - Rash looks a little less erythematous today - more dry desquamation  - No fever     Will sign off given the planned move to hospice. Thanks!

## 2023-02-10 NOTE — HOSPICE
[facility] RN note:  consult noted. Reviewing chart. Will address shortly. T/C to daughter Alicia Cadena [phone], unable to leave message, no other contact info on chart. Will continue to attempt contact. 1:15--In to meet with pt, daughter Alicia Cadena [phone] and grand daughter at bedside. Pt on BiPAP, neither this RN or daughter able to elicit a response which is new. Discussed hospice philosophy and services as it related to inpt hopsice. Kareen very tearful stated, Jodie Andre was just waiting for me to get here, she was struggling. \"   Daughter states she feels comfortable signing consents for hospice recognizing that pt is near to end of life. Discussed coming off of BiPAP at some point after extended family notified and given opportunity to visit. Approval for inpt admission at [facility] level of care per Dr Soco Cisneros. Daughter to sign consents with this RN. Thank you for the opportunity to care for this pt and family. Please contact hospice at [phone] with any questions or concerns.

## 2023-02-10 NOTE — HOSPICE
[facility]  [phone]    Inpatient Nursing Admission   Patient Name: Erica Sanders  YOB: 1960  Age: 60 y.o.        Date of Hospice Admission: 2/10/2023  Hospice Attending Elected by Patient: Dilip Sutherland MD  Primary Care Physician: Veronica Orellana MD  Admitting RN: Harriett Calloway RN  : Erika Kuhn     Level of Care (GIP/Routine/Respite): [facility]  Facility of Care: [facility]  Patient Room: 2307/01     HOSPICE SUMMARY   ER Visits/ Hospitalizations in past year:   Hospice Diagnosis: Respiratory failure ([address] [J96.90]  Onset Date of Hospice Diagnosis: 2/10  Summary of Disease Progression Leading to Hospice Diagnosis:     Co-Morbidities:   Patient Active Problem List   Diagnosis Code    COVID-19 ruled out Z20.822    A-fib [facility]) I48.91    S/P cardiac cath Z98.890    Mixed hyperlipidemia E78.2    Morbid obesity ([address] E66.01    Acute respiratory failure ([address] J96.00    Atrial flutter with rapid ventricular response (HCC) I48.92    JOSLYN (obstructive sleep apnea) G47.33    Acute on chronic respiratory failure with hypoxia and hypercapnia (HCC) J96.21, J96.22    SOB (shortness of breath) R06.02    Physical deconditioning R53.81    Counseling regarding advance care planning and goals of care Z71.89    Chronic pain G89.29    Dependence on supplemental oxygen Z99.81    Essential hypertension I10    Heart failure ([address] I50.9    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    Sciatica M54.30    Hypoxia R09.02    COPD exacerbation ([address] J44.1    CHF exacerbation ([address] I50.9    Hip osteomyelitis ([address] M86.9    Pain in left hip M25.552    Anemia D64.9    Symptomatic anemia D64.9    Respiratory failure ([address] J96.90     Principle Hospice Diagnosis: Respiratory failure  Diagnoses RELATED to the terminal prognosis: COPD, hypercapnea, OHS, JOSLYN, diastolic heart failure, septic shock, MSSA bacteremia, leukocytosis, left femoral neck fracture, anemia  Other Diagnoses:  T2DM, CAD, PAfib, severe obesity      HOSPICE SUMMARY         Geovanna Marte is a 60y.o. year old who was admitted to [facility]. Patient came to the hospital on 1/21/2023. She came in after routine lab work showed a hgb of 5.3. She was asymptomatic but did report her was having lower BP's at home. Lactic acid 3.86 and improved with IVF and blood transfusion. She was admitted with anemia and a h/o bleeding duodenal ulcers and gastritis. EGD showed duodenitis/gastritis. Patient's respiratory status has declined during hospitalization and she is now on BiPAP with 90% oxygen. She has worsening hypercapnea every time the team tries to taper the oxygen and worsening hypoxia. She also was treated for MSSA bacteremia and septic shock and has been on daptomycin. She has a left femoral neck fracture with no interventions per ortho. The patient's principle diagnosis has resulted in obtundation, respiratory distress         Functionally, the patient's Karnofsky and/or Palliative Performance Scale has declined over a period of days and is estimated at 10%. The patient is dependent on the following ADLs:  All     Objective information that support this patients limited prognosis includes:  inability to taper off bipap due to end stage copd. The patient/family chose comfort measures with the support of Hospice. Patient meets for GIP LOC as evidenced by resp distress, anticipate exacerbate trhrough removal of Bipap    Prognosis estimated based on 02/10/23 clinical assessment is:   [] Few to Many Hours  [x] Hours to Days   [] Few to Many Days   [] Days to Weeks   [] Few to Many Weeks   [] Weeks to Months   [] Few to Many Months    ASSESSMENT    Patient self-reports:  []  Yes    [] No    SYMPTOMS: resp distress    SIGNS/PHYSICAL FINDINGS: pt is newly unresponsive    KARNOFSKY: 10%    FAST for all dementia:      Learning Assessment:  Patient  N/A  Is patient willing/able to learn?   What is the highest level of education completed? Learning preference (written material, demonstration, visual)? Learning barriers (ESOL, [place], poor vision)? Caregiver daughter Chiqui Alcantar  Is caregiver willing to learn care for patient? yes  What is the highest level of education completed?  uncertain  Learning preference (written material, demonstration, visual)? demonstration  Learning barriers (ESOL, [place], poor vision)? None expressed    CLINICAL INFORMATION     Wt Readings from Last 3 Encounters:   02/06/23 110.7 kg (244 lb)   06/15/22 136.8 kg (301 lb 9.6 oz)   05/16/22 135.5 kg (298 lb 11.6 oz)     Ht Readings from Last 3 Encounters:   02/03/23 5' 3\" (1.6 m)   06/06/22 5' 3\" (1.6 m)   05/11/22 5' 3\" (1.6 m)     There is no height or weight on file to calculate BMI. There were no vitals taken for this visit. LAB VALUES  No results found for this visit on 02/10/23 (from the past 12 hour(s)). No results found for this visit on 02/10/23 (from the past 6 hour(s)). Lab Results   Component Value Date/Time    Protein, total 7.0 02/10/2023 01:34 AM    Albumin 1.9 (L) 02/10/2023 01:34 AM       Currently this patient has:  [x] Supplemental O2 [x] Peripheral IV  [] PICC    [] PORT   [] Petersen Catheter [] NG Tube   [] PEG Tube [] Ostomy    [] AICD: Has ICD been deactivated? [] Yes [] No:______    PLAN     Admit to [facility] LOC. Patient requires frequent nursing assessments and IV Medication administration for symptom control.     Will wean bipap after family members have said their good-byes  Hydromorphone 0.5 mg IV every 15 minutes prn respiratory distress/pain  Lorazepam 0.5 mg IV every 15 minutes prn anxiety/agitation/sob  Ketorolac 30 mg IV every 8 hours prn fever  Glycopyrrolate 0.2 mg IV every 4 hours prn secretions  Petersen care  Met with daughter and granddaughter at bedside and answered questions/provided support   and SW to support family needs  Disposition: likely to die in hospital  Hospice Plan of care was reviewed in detail and agree with current plan of care       Hospice Team Frequency Orders:  Skilled Nurse -   Daily x 7 days /every other day x 7 days  with 5 PRN visits for symptom control. MSW - 1 visit for initial assessment/evaluation for family support and need for volunteer services. Adelina Barbosa - 1 visit for initial assessment/evaluation for spiritual support. ADVANCE CARE PLANNING (Complete in ACP Flow Sheet)   Code Status: DNR  Durable DNR: [x]  Yes  []  No  Code Status Discussed/Confirmed:  yes  Preference for Other Life Sustaining Treatment Discussed/Confirmed:  no life sustaining measured desired  Hospitalization Preference: prefer to remain at [facility] through end of life  Advance Care Planning 1/25/2023   Patient's [address] is: Named in scanned ACP document   Confirm Advance Directive Yes, on file   Does the patient have other document types MOST/MOLST/POST/POLST        Service: [] Yes  []  No      [x] Unknown  Appropriate for Pinning Ceremony:  [] Yes     [x] No  Temple: [address]: [place]  [phone]. DISCHARGE PLANNING     1. Discharge Plan: home with hospice should pt stabilize  2. Patient/Family teaching: end of life process  3. Response to patient/family teaching: expressed understanding    SOCIAL/EMOTIONAL/SPIRITUAL NEEDS     Spiritual Issues Identified:     Psych/ Social/ Emotional Issues Identified: pt has one child Chico Vasquez who is at bedside. Caregiver Support:  [] Provided information on End of Life Care   [] Material Provided: Gone From My Sight or [employer]   Dr. Yamini Rogel contacted, discharge to hospice order received  Dr. Enma Stubbs contacted, agrees to serve as attending provider for hospice and provided verbal certification of terminal illness with life expectancy of 6 months or less. Orders for hospice admission, medications and plan of treatment received. Medication reconciliation completed.   MEDS: See medication list below  DME: Per hospital  Supplies: Per hospital  IDT communication to include MD, SN, SW, CH and support team    ALLERGIES AND MEDICATIONS     Allergies: Allergies   Allergen Reactions    Cefepime Rash     tolerated nafcillin 1/2023    Nafcillin Rash    Cefazolin Rash    Other Plant, Animal, Environmental Rash     Ivory soap caused rash on hands.     Zosyn [Piperacillin-Tazobactam] Rash     Possible rash to zosyn, also received IV contrast and blood  Tolerated nafcillin before/after 1/2023     Current Facility-Administered Medications   Medication Dose Route Frequency    LORazepam (ATIVAN) injection 0.5 mg  0.5 mg IntraVENous Q15MIN PRN    ketorolac (TORADOL) injection 30 mg  30 mg IntraVENous Q8H PRN    glycopyrrolate (ROBINUL) injection 0.2 mg  0.2 mg IntraVENous Q4H PRN    bisacodyL (DULCOLAX) suppository 10 mg  10 mg Rectal DAILY PRN    HYDROmorphone (DILAUDID) injection 0.5 mg  0.5 mg IntraVENous Q15MIN PRN    sodium chloride (NS) flush 5 mL  5 mL IntraVENous PRN

## 2023-02-10 NOTE — H&P
[address] [facility] Help to Those in Need  [phone]    Patient Name: Carlos De Leon  YOB: 1960    Date of Provider Hospice Visit: 02/10/23    Level of Care:   [x] General Inpatient (GIP)    [] Routine   [] Respite    Current Location of Care:  [] [facility] [] [facility] [x] [address] [] [facility] [] Hospice House THE [facility]    IF [facility], patient referred from:  [] [facility] [] [facility] [] [address] [] [facility] [] Home [] Other:     Date of Original Hospice Admission: 2/10/2023  Hospice Medical Director at time of admission: Dr. Skylar Bell Diagnosis: Respiratory failure  Diagnoses RELATED to the terminal prognosis: COPD, hypercapnea, OHS, JOSLYN, diastolic heart failure, septic shock, MSSA bacteremia, leukocytosis, left femoral neck fracture, anemia  Other Diagnoses:  T2DM, CAD, PAfib, severe obesity     HOSPICE SUMMARY        Carlos De Leon is a 60y.o. year old who was admitted to [facility]. Patient came to the hospital on 1/21/2023. She came in after routine lab work showed a hgb of 5.3. She was asymptomatic but did report her was having lower BP's at home. Lactic acid 3.86 and improved with IVF and blood transfusion. She was admitted with anemia and a h/o bleeding duodenal ulcers and gastritis. EGD showed duodenitis/gastritis. Patient's respiratory status has declined during hospitalization and she is now on BiPAP with 90% oxygen. She has worsening hypercapnea every time the team tries to taper the oxygen and worsening hypoxia. She also was treated for MSSA bacteremia and septic shock and has been on daptomycin. She has a left femoral neck fracture with no interventions per ortho. The patient's principle diagnosis has resulted in obtundation, respiratory distress        Functionally, the patient's Karnofsky and/or Palliative Performance Scale has declined over a period of days and is estimated at 10%.  The patient is dependent on the following ADLs:  All    Objective information that support this patients limited prognosis includes:  inability to taper off bipap due to end stage copd. The patient/family chose comfort measures with the support of Hospice. HOSPICE DIAGNOSES   Active Symptoms:  1. Respiratory distress  2. End stage COPD  3. Obtundation   4. Hospice care patient     PLAN   Admit to [facility] LOC. Patient requires frequent nursing assessments and IV Medication administration for symptom control. Will wean bipap after family members have said their good-byes  Hydromorphone 0.5 mg IV every 15 minutes prn respiratory distress/pain  Lorazepam 0.5 mg IV every 15 minutes prn anxiety/agitation/sob  Ketorolac 30 mg IV every 8 hours prn fever  Glycopyrrolate 0.2 mg IV every 4 hours prn secretions  Petersen care  Met with daughter and granddaughter at bedside and answered questions/provided support   and SW to support family needs  Disposition: likely to die in hospital  Hospice Plan of care was reviewed in detail and agree with current plan of care    Prognosis estimated based on 02/10/23 clinical assessment is:   [x] Hours to Days    [] Days to Weeks    [] Other:    Communicated plan of care with: Hospice Case Manager; Hospice IDT; Care Team     GOALS OF CARE     Patient/Medical POA stated Goal of Care: comfort care/hospice care    [x] I have reviewed and/or updated ACP information in the Advance Care Planning Navigator. This information is available in the [facility] [address] link in the patient's chart header.     Primary Decision [facility] Agent):   Primary Decision Maker: Courtney Barber Child - [phone]    Secondary Decision Maker: Diania Severs - Sister - [phone]    Resuscitation Status: DNR  If DNR is there a Durable DNR on file? : [x] Yes [] No (If no, complete Durable DNR)    HISTORY     History obtained from: chart review and IDT    CHIEF COMPLAINT:    The patient is:   [] Verbal  [] Nonverbal  [x] Unresponsive    HPI/SUBJECTIVE:  Patient is not responsive and wearing bipap with 90% oxygen. Daughter and granddaughter at bedside.          REVIEW OF SYSTEMS     The following systems were: [] reviewed  [x] unable to be reviewed    Positive ROS include:  Constitutional: fatigue, weakness, in pain, short of breath  Ears/nose/mouth/throat: increased airway secretions  Respiratory:shortness of breath, wheezing  Gastrointestinal:poor appetite, nausea, vomiting, abdominal pain, constipation, diarrhea  Musculoskeletal:pain, deformities, swelling legs  Neurologic:confusion, hallucinations, weakness  Psychiatric:anxiety, feeling depressed, poor sleep  Endocrine:     Adult Non-Verbal Pain Assessment Score: 2/10    Face  [x] 0   No particular expression or smile  [] 1   Occasional grimace, tearing, frowning, wrinkled forehead  [] 2   Frequent grimace, tearing, frowning, wrinkled forehead    Activity (movement)  [x] 0   Lying quietly, normal position  [] 1   Seeking attention through movement or slow, cautious movement  [] 2   Restless, excessive activity and/or withdrawal reflexes    Guarding  [x] 0   Lying quietly, no positioning of hands over areas of body  [] 1   Splinting areas of the body, tense  [] 2   Rigid, stiff    Physiology (vital signs)  [] 0   Stable vital signs  [x] 1   Change in any of the following: SBP > 20mm Hg; HR > 20/minute  [] 2   Change in any of the following: SBP > 30mm Hg; HR > 25/minute    Respiratory  [] 0   Baseline RR/SpO2, compliant with ventilator  [x] 1   RR > 10 above baseline, or 5% drop SpO2, mild asynchrony with ventilator  [] 2   RR > 20 above baseline, or 10% drop SpO2, asynchrony with ventilator     FUNCTIONAL ASSESSMENT     Palliative Performance Scale (PPS):10%       PSYCHOSOCIAL/SPIRITUAL ASSESSMENT     Active Problems:    Respiratory failure ([facility] [address] (2/10/2023)      Past Medical History:   Diagnosis Date    Acute on chronic respiratory failure with hypoxia and hypercapnia (HCC) 11/20/2020    Atrial fibrillation ([address] 11/4/2020 CHF (congestive heart failure) (HCC)     Diabetes ([facility] [address]     Hypertension     Ill-defined condition     high cholesterol    Ill-defined condition     tachycardia    JOSLYN (obstructive sleep apnea) 11/20/2020    Other and unspecified symptoms and signs involving general sensations and perceptions     ruptured disc    S/P cardiac cath 11/4/2020    11/3/2020 nonobstructive disease      Past Surgical History:   Procedure Laterality Date    AK COMPRE ELECTROPHYSIOL XM W/LEFT ATRIAL PACNG/REC N/A 11/23/2020    Lt Atrial Pace & Record During Ep Study performed by Juliet Gandara MD at [facility] CARDIAC CATH LAB    AK COMPRE EP EVAL ABLTJ 3D MAPG TX SVT N/A 11/23/2020    ABLATION A-FLUTTER performed by Juliet Gandara MD at [facility] [address] ADD ON N/A 11/23/2020    Ablation Svt/Vt Add On performed by Juliet Gandara MD at [facility] CARDIAC CATH LAB    AK INTRACARDIAC ELECTROPHYSIOLOGIC 3D MAPPING N/A 11/23/2020    Ep 3d Mapping performed by Juliet Gandara MD at [facility] CARDIAC CATH LAB    AK PROGRAMMED STIMJ & PACG AFTER IV DRUG NFS N/A 11/23/2020    Drug Stimulation performed by Juliet Gandara MD at [facility] CARDIAC CATH LAB      Social History     Tobacco Use    Smoking status: Former    Smokeless tobacco: Never   Substance Use Topics    Alcohol use: No     No family history on file. Allergies   Allergen Reactions    Cefepime Rash     tolerated nafcillin 1/2023    Nafcillin Rash    Cefazolin Rash    Other Plant, Animal, Environmental Rash     Ivory soap caused rash on hands.     Zosyn [Piperacillin-Tazobactam] Rash     Possible rash to zosyn, also received IV contrast and blood  Tolerated nafcillin before/after 1/2023      Current Facility-Administered Medications   Medication Dose Route Frequency    LORazepam (ATIVAN) injection 0.5 mg  0.5 mg IntraVENous Q15MIN PRN    ketorolac (TORADOL) injection 30 mg  30 mg IntraVENous Q8H PRN    glycopyrrolate (ROBINUL) injection 0.2 mg  0.2 mg IntraVENous Q4H PRN    bisacodyL (DULCOLAX) suppository 10 mg  10 mg Rectal DAILY PRN    HYDROmorphone (DILAUDID) injection 0.5 mg  0.5 mg IntraVENous Q15MIN PRN    sodium chloride (NS) flush 5 mL  5 mL IntraVENous PRN        PHYSICAL EXAM     Wt Readings from Last 3 Encounters:   02/06/23 110.7 kg (244 lb)   06/15/22 136.8 kg (301 lb 9.6 oz)   05/16/22 135.5 kg (298 lb 11.6 oz)       There were no vitals taken for this visit. Supplemental O2 at 90% with Bipap    [x] Yes  [] NO  Last bowel movement: N/A    Currently this patient has:  [x] Peripheral IV [] PICC  [] PORT [] ICD    [] Petersen Catheter [] NG Tube   [] PEG Tube    [] Rectal Tube [] Drain  [] Other:     Constitutional:  Patient wearing bipap and nonresponsive in bed. Eyes: closed  ENMT: oral mucosa cannot be assessed secondary to Bipap mask. Cardiovascular:  [id number] no GRM's, 2+ edema of LE's  Respiratory: + increased wob, rhonchi throughout  Gastrointestinal: no distention  Musculoskeletal: no deformities or contractures  Skin: warm and dry, erythema of forearms  Neurologic:  nonresponsive  Psychiatric: nonresponsive  Other:       Pertinent Lab and or Imaging Tests:  Lab Results   Component Value Date/Time    Sodium 136 02/10/2023 01:34 AM    Potassium 4.5 02/10/2023 01:34 AM    Chloride 98 02/10/2023 01:34 AM    CO2 34 (H) 02/10/2023 01:34 AM    Anion gap 4 (L) 02/10/2023 01:34 AM    Glucose 94 02/10/2023 01:34 AM    BUN 18 02/10/2023 01:34 AM    Creatinine 1.16 (H) 02/10/2023 01:34 AM    BUN/Creatinine ratio 16 02/10/2023 01:34 AM    GFR est AA >60 06/16/2022 06:09 AM    GFR est non-AA >60 06/16/2022 06:09 AM    Calcium 7.9 (L) 02/10/2023 01:34 AM     Lab Results   Component Value Date/Time    Protein, total 7.0 02/10/2023 01:34 AM    Albumin 1.9 (L) 02/10/2023 01:34 AM         Jose Pierson NP

## 2023-02-10 NOTE — PROGRESS NOTES
Nutrition: Chart reviewed for follow up. Patient is transitioning to hospice. Nutrition will sign off. Continue PO diet for pleasure. Thanks.   Tramaine Matt RD

## 2023-02-10 NOTE — PROGRESS NOTES
Transition of Care Plan:     RUR: 21% \"high risk\"  Disposition: Inpatient hospice  If SNF or IPR: Date FOC offered:  Date FOC received:  Date authorization started with reference number:  Date authorization received and expires:  Accepting facility:  Follow up appointments: PCP & Specialists as needed  DME needed: Pt has Hospital Bed and is bed bound  Transportation at Discharge: Stretcher transport will need to be arranged  Keys or means to access home: Pt has access to home  IM Medicare Letter: 2nd IM needed at d/c  Is patient a [place] and connected with the [facility]? N/A  Caregiver Contact: Pt's daughter Marion Ureña: [phone]  Discharge Caregiver contacted prior to discharge? yes  Care Conference needed?: Not at this time    Update 2pm: Pt has seen pt. Recommendation is to admit patient for inpatient hospice. Update 1020 am: CM has sent a request for hospice session. CM will continue to follow up. Initial note: Chart reviewed for updates. Pt is still pending medical clearance. CM will continue to follow up.     DOROTHY Ag    [phone]

## 2023-02-10 NOTE — CONSULTS
Pulmonary, Critical Care, and Sleep Medicine      Name: Trinidad Sagastume MRN: [id number]   : 1960 Hospital: [address]   Date: 2/10/2023  Admission date: 2023 Hospital Day: 21       History:     23:     Pt has been pretty much stuck on bipap. This am I was present with RT as he tried her on maximal high flow. Despite high flow her oxygen levels were still in mid 80s. She was have nursing care and repositioning. She had to be placed back on the bipap. She would prefer the high flow but I stated her oxygen levels are dropping to low to be maintained on the high flow. She feels as though it is difficult for her to breath. Reviewed and she has been here since 23. Despite maximal medical therapy she has not been able to improve. Prior to admission she had been pretty much house bound. I think it is time to get palliative care back involved and consider trying to get pt into hospice. 2-10-23:   She was seen this pm. Her daughter and family were at bedside. Pt is on bipap. She was given morphine earlier seems comfortable when seen. She has been accepted for hospice. Reviewed the notes from last 24 hours. IMPRESSION:   Acute chronic respiratory failure with Hypoxia and Hypercapnea; home O2, NIV, had worsening hypoxia today. She was on NIV when seen, Fio2 at 40%. Pox was 88-90%. Can be placed on High flow NC as able goal to keep over 87%. Right basilar atelectasis. Possible issues with Volume overload and fluid retention in the context of morbid obesity. Has been with anasarca.    Asthma (vs COPD) not acutely exacerbated; normal eosinophils, she is pretty clear anteriorly when seen this pm. .   Former smoker  Suspected JOSLYN and /or OHS  (COPD OHS Thoracic Restrictive disease ) Restrictive disease from obesity J84.4 and E 66.8  Afib, refractory  DM   Non-ambulatory at baseline due to arthritis, Obesity, SOB ; bedbound; wear adult diapers  Discussed with nurse and RT. Morbid Obesity Body mass index is 43.22 kg/m². RECOMMENDATIONS/PLAN:     Continue noninvasive ventilation, Will adjust fio2 to keep pox over 87%. If off the bipap, will place on midflow. Will make breathing treatments as needed  She is going to hospice. We will see again as needed. [x] High complexity decision making was performed  [x] See my orders for details      Subjective/Initial History:     I was asked by Julio C Gore DO to see Romayne Eddy  a 60 y.o.  female in consultation for a chief complaint of respiratory failure    Excerpts from admission 1/21/2023 or consult notes as follows:     \"   Cindi Dancer is a 60 y.o.  female with pertinent past medical history of chronic hypoxemic respiratory failure on 4 L/min home oxygen therapy secondary to COPD, diabetes mellitus, essential hypertension, hyperlipidemia, JOSLYN compliant with CPAP, history duodenal ulcers, chronic anemia of indeterminate etiology who presents after routine lab work performed yesterday demonstrated hemoglobin of 5.3. Patient denies any sources of blood loss including hematochezia, melena, hematuria, hematemesis, epistaxis, bruising/bleeding. She reports she has had lower than normal blood pressures at home with systolics in the 15S when her baseline is in the 130s, but denies any other associated symptoms. Medication reconciliation does not list any anticoagulation, but patient reports she is taking a blood thinner-unsure which one. ROS otherwise negative. Patient denies tobacco, alcohol, or illicit drug use. In the ED, patient afebrile and hemodynamically stable saturating upper 90s on baseline 4 L/min home oxygen therapy.   Labs demonstrate: WBC 9.3, hemoglobin 5.3, platelets 306, sodium 140, potassium 4.4, chloride 95, bicarb 42 (chronic), glucose 152, BUN 17, creatinine 0.89, lactic acid 3.86 improved to 2.4 with IV fluids and 1 unit packed red blood cells. Fecal occult blood negative. UA not reflexed to culture. CXR demonstrates diffuse hazy interstitial opacities suggestive of edema with bandlike right upper lobe atelectasis. \"    Patient poor historian. Patient sent home April 2022 with noninvasive ventilation. Patient thinks she is on CPAP. Now on BiPAP. She has never made it back to the office for clinic follow-up. [employer] is [employer]. She does not recall having had a sleep study. At 1 point she weighed 310 pounds. She states she lost 50 pounds. Weight on admission 282 pounds. BMI 50 kg/m². Patient says she is bedbound. Daughter helps her at home. Daughter has back problems. Patient without any stomach pain. She is thirsty all the time. She has no teeth. Last saw Dr. Topher Curiel in the office December 2020. Echocardiogram April 2022 left ejection fraction 50 to 65%     PAST MEDICAL HISTORY:  Significant for AFib, CHF, diabetes, hypertension, obstructive sleep apnea, COPD    Allergies   Allergen Reactions    Cefepime Rash     tolerated nafcillin 1/2023    Nafcillin Rash    Cefazolin Rash    Other Plant, Animal, Environmental Rash     Ivory soap caused rash on hands.     Zosyn [Piperacillin-Tazobactam] Rash     Possible rash to zosyn, also received IV contrast and blood  Tolerated nafcillin before/after 1/2023        MAR reviewed and pertinent medications noted or modified as needed       Current Facility-Administered Medications   Medication    morphine (10 mg/5 mL) 10 mg/5 mL oral solution 5 mg    0.9% sodium chloride infusion 250 mL    acetaminophen (TYLENOL) tablet 650 mg    diphenhydrAMINE (BENADRYL) capsule 25 mg    DAPTOmycin (CUBICIN) 1,000 mg in 0.9% sodium chloride 50 mL IVPB    0.9% sodium chloride infusion    nystatin (MYCOSTATIN) 100,000 unit/gram powder    sucralfate (CARAFATE) tablet 1 g    camphor-menthoL (SARNA) 0.5-0.5 % lotion    albuterol-ipratropium (DUO-NEB) 2.5 MG-0.5 MG/3 ML L.acidophilus-paracasei-S.thermophil-bifidobacter (RISAQUAD) 8 billion cell capsule    potassium bicarb-citric acid (EFFER-K) tablet 20 mEq    folic acid (FOLVITE) 1 mg in 0.9% sodium chloride 50 mL ivpb    [Held by provider] furosemide (LASIX) tablet 40 mg    midodrine (PROAMATINE) tablet 10 mg    enoxaparin (LOVENOX) injection 30 mg    alcohol 62% (NOZIN) nasal  1 Ampule    insulin lispro (HUMALOG) injection    glucose chewable tablet 16 g    glucagon (GLUCAGEN) injection 1 mg    metoprolol tartrate (LOPRESSOR) tablet 12.5 mg    [Held by provider] atorvastatin (LIPITOR) tablet 10 mg    pantoprazole (PROTONIX) tablet 40 mg    sodium chloride (NS) flush 5-40 mL    sodium chloride (NS) flush 5-40 mL    acetaminophen (TYLENOL) tablet 650 mg    Or    acetaminophen (TYLENOL) suppository 650 mg    polyethylene glycol (MIRALAX) packet 17 g    ondansetron (ZOFRAN ODT) tablet 4 mg    Or    ondansetron (ZOFRAN) injection 4 mg    dextrose 10% infusion 0-250 mL    balsam peru-castor oiL (VENELEX) ointment      Patient PCP: Stephanie Gil MD  PMH:  has a past medical history of Acute on chronic respiratory failure with hypoxia and hypercapnia ([facility] [address] (11/20/2020), Atrial fibrillation ([facility] [address] (11/4/2020), CHF (congestive heart failure) ([facility] [address] Diabetes ([facility] [address] Hypertension, Ill-defined condition, Ill-defined condition, JOSLYN (obstructive sleep apnea) (11/20/2020), Other and unspecified symptoms and signs involving general sensations and perceptions, and S/P cardiac cath (11/4/2020). PSH:   has a past surgical history that includes pr compre ep eval abltj 3d mapg tx svt (N/A, 11/23/2020); pr icar catheter ablation arrhythmia add on (N/A, 11/23/2020); pr intracardiac electrophysiologic 3d mapping (N/A, 11/23/2020); pr compre electrophysiol xm w/left atrial pacng/rec (N/A, 11/23/2020); and pr programmed stimj & pacg after iv drug nfs (N/A, 11/23/2020). FHX: family history is not on file.    SHX:  reports that she has quit smoking. She has never used smokeless tobacco. She reports that she does not drink alcohol and does not use drugs. ROS:A comprehensive review of systems was negative except for that written in the HPI. Objective:     Vital Signs: Telemetry:     Intake/Output:   Visit Vitals  /69 (BP 1 Location: Right lower arm, BP Patient Position: At rest)   Pulse 65   Temp 97 °F (36.1 °C)   Resp 18   Ht 5' 3\" (1.6 m)   Wt 110.7 kg (244 lb)   SpO2 90%   Breastfeeding No   BMI 43.22 kg/m²       Temp (24hrs), Av °F (36.7 °C), Min:97 °F (36.1 °C), Max:98.3 °F (36.8 °C)        O2 Device: BIPAP O2 Flow Rate (L/min): 6 l/min       Wt Readings from Last 4 Encounters:   23 110.7 kg (244 lb)   06/15/22 136.8 kg (301 lb 9.6 oz)   22 135.5 kg (298 lb 11.6 oz)   22 134.7 kg (297 lb)        No intake or output data in the 24 hours ending 02/10/23 1228      Last shift:      No intake/output data recorded. Last 3 shifts: No intake/output data recorded. Physical Exam:   General:  female; cooperative;  on NIV when seen. Alert and interactive. No distress. HEENT: NCAT, no dentition, lips and mucosa dry  Eyes: anicteric; conjunctiva clear  Neck: no nodes, no accessory MM use. Chest: no deformity, thick  Cardiac: regular rate, rhythm; no murmur  Lungs: distant breath sounds; no wheezes, deisy,  no rhonchi, On NIV when seen. Abd: soft, NT, hypoactive BS, OBESE,  Ext: 2-3+ edema; no joint swelling; No clubbing, has dressing to the RLE.    : No irwin,   Neuro: fluent,  follows commands; generalized weakness  Psych- no agitation, oriented to person;   Skin: warm, dry, no cyanosis; seems flush over upper chest.   Pulses: 1-2+ Bilateral pedal, radial  Capillary: brisk; pale    Labs:    Recent Labs     02/10/23  0134 23  0219 23  0252   WBC 12.1* 12.4* 18.9*   HGB 9.4* 8.8* 8.7*    172 171       Recent Labs     02/10/23  0134 23  0219 23    137 134*   K 4.5 4.2 4.0   CL 98 97 95*   CO2 34* 34* 34*   GLU 94 67 81   BUN 18 18 15   CREA 1.16* 1.26* 1.22*   CA 7.9* 7.7* 8.0*   ALB 1.9* 1.8* 1.8*   ALT 24 22 24       Recent Labs     02/09/23  1126   PH 7.35   PCO2 59*   PO2 62*   HCO3 32*   FIO2 50       No results for input(s): CPK, CKNDX, TROIQ in the last 72 hours. No lab exists for component: CPKMB    Lab Results   Component Value Date/Time    BNP 14 04/19/2009 05:00 PM        Lab Results   Component Value Date/Time    Culture result: NO GROWTH 6 DAYS 01/31/2023 02:54 AM    Culture result: NO GROWTH 6 DAYS 01/29/2023 12:38 PM    Culture result: NO GROWTH 6 DAYS 01/27/2023 04:45 AM     Lab Results   Component Value Date/Time    TSH 1.88 09/08/2014 02:20 PM       Imaging:    CXR Results  (Last 48 hours)                 02/09/23 0557  XR CHEST PORT Final result    Impression:  Stable cardiomegaly. Worsening right lung interstitial and patchy   airspace consolidation, most extensive within the right midlung and right lung   base. Narrative:  INDICATION: Interval change. Portable AP view of the chest.       Direct comparison made to prior chest x-ray dated February 7, 2023. Cardiomediastinal silhouette is stable, but enlarged. There is worsening right   lung interstitial and patchy air space consolidation, most extensively in the   right midlung base. No definite pleural fluid is visualized. There is no   pneumothorax. Left-sided PICC line extends to the proximal SVC. Please note that this dictation was completed with momondo, the computer voice recognition software. Quite often unanticipated grammatical, syntax, homophones, and other interpretive errors are inadvertently transcribed by the computer software. Please disregard these errors.   Please excuse any errors that have escaped final proofreading  ______________________________________________________________________    2-4-23:   COMPARISON: 1/31/2023     FINDINGS:  OCTAVIANO portable upright view of the chest demonstrates a stable enlarged  cardiopericardial silhouette. The lungs are unchanged in overall appearance. .  Atelectasis on the right. Left subclavian venous access catheter is in place. Catheter tip is in appropriate position for use. There is no associated  pneumothorax. The osseous structures are unremarkable. Patient is on a cardiac  monitor. IMPRESSION  Central venous acces catheter in position for use. No pneumothorax     No other significant interval change. Thank you for allowing us to participate in the care of this patient.       This care involved high complexity medical decision making: I personally:  Reviewed the flowsheet and previous days notes  Reviewed and summarized records or history from previous days note or discussions with staff, family  High Risk Drug therapy requiring intensive monitoring for toxicity: eg steroids, antibiotics  Reviewed and/or ordered Clinical lab tests  Reviewed images and/or ordered Radiology tests  Reviewed and/or adjusted NiPPV settings  discussed my assessment/management with : Nursing, for coordination of care      Makenzie Sandoval MD

## 2023-02-10 NOTE — PROGRESS NOTES
Palliative Medicine      Code Status: DNR    Advance Care Planning:  Advance Care Planning 2023   Patient's Healthcare Decision Maker is: Named in scanned ACP document   Confirm Advance Directive Yes, on file   Does the patient have other document types MOST/MOLST/POST/POLST       Patient / Family Encounter Documentation    Participants (names): Isabel Helio, Topher Claire, dtr, Harley Radar, granddaughter. Osito Ding, LCSW    Narrative: LCSW visited patient with family at bedside after consulting nurse, Eddie Roque, and Nke[place], hospice nurse. Anu Luna stopped in during visit to offer support. Daughter was tearful at times as she shared how close she and her mother are and engaged in life review, sharing that her grandfather  about 20 years ago, right before he was to go home on hospice. She also shared that her aunt  a little over a year ago as well as another family member. This writer provided calm, caring presence, comfort touch to daughter's shoulder, encouraged her to continue to talk to patient and touch her, to consider other ways of finding comfort, music, etc.  Affirmed her care of patient and encouraged her self care. Arthur doesn't drive so she said she can only stay until 4p when her ride comes. Psychosocial Issues Identified/ Resilience Factors:   Patient and daughter Arthur have lived together all but 1 year of [place]'s life. Diane Newman also lives with them. They were hoping patient would be able to come home on hospice, but her respiratory status is declining. Patient is on Bipap and was not awake when this writer was in the room, but daughter reported she had interacted with them when they arrived. Daughter was tearful at times as she shared how close she and her mother are and engaged in life review, sharing that her grandfather  about 20 years ago, right before he was to go home on hospice.   She also shared that her aunt  a little over a year ago as well as another family member. RUDYW educated family on hospice bereavement support as part of hospice care. Caregiver [place]: high  Does the caregiver feel confident administering medication? No  Does the caregiver need any help connecting with community resources? No. Hospice consult is active. Does the caregiver feel confident assisting with activities of daily living? Yes. She has cared for other family members who were bedbound. Goals of Care / Plan:   Patient's symptoms will be managed. Patient will likely be admitted to [facility], as representative is meeting with family now. Palliative team is available as appropriate and will sign off once patient is admitted to hospice. Thank you for including Palliative Medicine in the care of Ms. Diego Brown.     Pantera Pat LCSW  [phone])

## 2023-02-11 VITALS
HEIGHT: 63 IN | DIASTOLIC BLOOD PRESSURE: 69 MMHG | HEART RATE: 65 BPM | OXYGEN SATURATION: 90 % | RESPIRATION RATE: 18 BRPM | TEMPERATURE: 97 F | WEIGHT: 244 LBS | SYSTOLIC BLOOD PRESSURE: 112 MMHG | BODY MASS INDEX: 43.23 KG/M2

## 2023-02-11 PROBLEM — E11.9 DM2 (DIABETES MELLITUS, TYPE 2) (HCC): Status: ACTIVE | Noted: 2023-02-11

## 2023-02-11 PROBLEM — S72.002A LEFT DISPLACED FEMORAL NECK FRACTURE (HCC): Status: ACTIVE | Noted: 2023-02-11

## 2023-02-11 PROBLEM — A41.01 SEPTIC SHOCK WITH ACUTE ORGAN DYSFUNCTION DUE TO METHICILLIN SUSCEPTIBLE STAPHYLOCOCCUS AUREUS (MSSA) (HCC): Status: ACTIVE | Noted: 2023-02-11

## 2023-02-11 PROBLEM — R78.81 MSSA BACTEREMIA: Status: ACTIVE | Noted: 2023-02-11

## 2023-02-11 PROBLEM — B95.61 MSSA BACTEREMIA: Status: ACTIVE | Noted: 2023-02-11

## 2023-02-11 PROBLEM — R65.21 SEPTIC SHOCK WITH ACUTE ORGAN DYSFUNCTION DUE TO METHICILLIN SUSCEPTIBLE STAPHYLOCOCCUS AUREUS (MSSA) (HCC): Status: ACTIVE | Noted: 2023-02-11

## 2023-02-11 PROBLEM — I25.10 CAD (CORONARY ARTERY DISEASE): Status: ACTIVE | Noted: 2023-02-11

## 2023-02-11 NOTE — PROGRESS NOTES
Bedside shift change report given to Lucy Rosas  (oncoming nurse) Meng Salgado RN (offgoing nurse). Report included the following information SBAR, Kardex, ED Summary, Procedure Summary, Intake/Output, MAR, and Recent Results.

## 2023-02-11 NOTE — DISCHARGE SUMMARY
Hospitalist Discharge Summary     Patient ID:    José Antonio Lopez  [id number]  30 y.o.  1960    Admit date: 2/10/2023    Discharge date : 2/11/2023        Final Diagnoses: Active Problems:    Respiratory failure ([address] (2/10/2023)    Active Hospital Problems    Diagnosis Date Noted    MSSA bacteremia 02/11/2023    Septic shock with acute organ dysfunction due to methicillin susceptible Staphylococcus aureus (MSSA) ([address] 02/11/2023    Left displaced femoral neck fracture ([address] 02/11/2023    DM2 (diabetes mellitus, type 2) ([address] 02/11/2023    CAD (coronary artery disease) 02/11/2023    Respiratory failure ([address] 02/10/2023    Symptomatic anemia 01/22/2023    Hypoxia 04/21/2022    Heart failure ([address] 04/21/2022    COPD exacerbation ([address] 04/21/2022    JOSLYN (obstructive sleep apnea) 11/20/2020    Acute on chronic respiratory failure with hypoxia and hypercapnia ([address] 11/20/2020    Acute respiratory failure ([address] 11/18/2020    Morbid obesity ([address] 11/15/2020    A-fib ([address] 11/04/2020        Reason for Hospitalization:  60 y.o.  female with pertinent past medical history of chronic hypoxemic respiratory failure on 4 L/min home oxygen therapy secondary to COPD, diabetes mellitus, essential hypertension, hyperlipidemia, JOSLYN compliant with CPAP, history duodenal ulcers, chronic anemia of indeterminate etiology who presents after routine lab work performed yesterday demonstrated hemoglobin of 5.3. Patient denies any sources of blood loss including hematochezia, melena, hematuria, hematemesis, epistaxis, bruising/bleeding. She reports she has had lower than normal blood pressures at home with systolics in the 53H when her baseline is in the 130s, but denies any other associated symptoms. Medication reconciliation does not list any anticoagulation, but patient reports she is taking a blood thinner-unsure which one. ROS otherwise negative.   Patient denies tobacco, alcohol, or illicit drug use.    Hospital Course:   Acute on chronic hypoxic respiratory failure POA  COPD-- end stage/Poor prognosis,   JOSLYN compliant with CPAP  Diastolic heart failure  Patient was on 4-6 L supplemental nasal cannula oxygen at home and BiPAP at night time. Patient oxygen requirements have increased. Went up to FiO2 50 % on BiPAP. Saturating 85% on 6 L NC oxygen. Will attribute this to anxiety more than anything else. Patient did not opt for hospice. Will re-consult pulmonology for any further recommendations. 02/07  Patient had to be placed back on BiPAP yesterday due to respiratory distress  When seen this morning resting on BiPAP, will try to wean it off during the day and switch to high flow  Patient is currently on Lasix  Repeat chest x-ray. Showed no acute changes   02/08  Patient was on BiPAP the whole day, still short of breath and tachypneic  We will try to transition off to nasal cannula  02/09: Remains on BiPAP during the day, unable to wean off, becomes hypoxic when tried to transition to high flow. Patient is not interested in going home with hospice  Hospice consult  placed  02/10: Worsening respiratory failure, unable to be taken off BiPAP, oxygen requirement increasing FiO2 90% patient still saturating around 88%  Seen by hospice,  patient and family  ready for inpatient hospice consulted management        Septic shock  MSSA bacteremia  Leukocytosis  - ID recommended discharge on daptomycin  - Outpatient follow-up with ID    Left femoral neck fracture  - No intervention per ortho  - Outpatient follow up with ortho     Acute on chronic anemia:  GI following, high risk for Endoscopy, colonscopy  Occult stool negative. No signs of active bleeding  GI performed EGD. High risk for colonoscopy. Signed off.   Hematology following   Has folate and iron deficiency, started on iv folic acid and iv iron last week  S/p transfusions  S/p EGD 2/1 by GI- found duodenitis/gastritis - recommends to cont PPI BID + carafate  Outpatient follow-up with hematology and GI   02/07  Hb 6.9 , PRBC ordered by heme-onc, appreciate help  02/08:  Repeat hemoglobin 8.7, persistent leukocytosis  Erythematous rash- persistent  S/p Pencillin and cephalosporins  Patient is now on daptomycin  Type II DM:  Continue lispro sliding scale     Paroxysmal afib  CAD:  Continue with metoprolol, hold statin while  Continue xarelto on discharge if hemoglobin stays stable  Aspirin discontinued given duodenitis and gastritis     Severe obesity  BMI 51  Complicates overall care     Acute kindey Injury:  Creatinine 1.17, on IV fluid, creatinine continues to trend up  Lasix on hold due to rising creatinine  Patient reported not eating much     40 or above Morbid obesity / Body mass index is 43.22 kg/m². Estimated discharge date: TBD   Barriers: Increasing oxygen requirements, still on BiPAP, patient interested in going home with hospice  Updated patient  daughter , patient answered on 02/09  Code status: DNR  Prophylaxis: Lovenox  Recommended Disposition: Home w/Family    Discharge Medications:   Current Discharge Medication List            Follow up Care:    1. Jackeline Phillips MD in 1-2 weeks. Follow-up Information    None           * Follow-up Care/Patient Instructions: Activity: Activity as tolerated  Diet: Comfort feeding  Wound Care: None needed      Condition at Discharge:  Stable  __________________________________________________________________    Disposition Hospice     ____________________________________________________________________    Code Status:  DNR  ___________________________________________________________________    Discharge Exam:  Patient seen and examined by me on discharge day.   Physical Exam     CONSULTATIONS: Cardiology, Pulmonary/Intensive care, GI, Hematology/Oncology, Orthopedic Surgery, and Podiatry    Significant Diagnostic Studies:   Recent Results (from the past 24 hour(s))   GLUCOSE, POC    Collection Time: 02/10/23 12:06 PM   Result Value Ref Range    Glucose (POC) 101 65 - 117 mg/dL    Performed by Isha Watkins      No orders to display       Discharge: time spent greater than 35 minutes in discharge  Education and counseling. Signed:  Viola Vidal NP  2/11/2023  8:41 AM

## 2023-02-11 NOTE — HOSPICE
[facility] RN note:  family gathering at bedside including 5 siblings, dtr and grand daughter. Pt alert this am, requiring PRN dilaudid x 3 with just enough relief to remain responsive to family. Confirmed with pt DNR status and desire to remove Bipap aware that this may be a terminal event but hopeful that she could return home. The timing of removal is up to pt as long as she is able to make decision. Deborah [facility]  [phone]    [facility] Daily Nursing Note   Patient Name: Celina Robbins  YOB: 1960  Age: 60 y.o. Date of Visit: 02/11/23  Facility of Care: [facility]  Patient Room: 1111/01     Hospice Attending: Holli Dela Cruz MD  Hospice Diagnosis: Respiratory failure [facility]) [J96.90]    Level of Care: [facility]    Current [facility] Symptoms    Resp distress          ASSESSMENT & PLAN   Must update Plan of Care including visit frequencies for IDT members  Continue GIP level of care  Resp distress:  continue BiPAP while family reuniting with plan to remove when pt is ready. PRN IV dilaudid 0.5mg ever 15min PRN for now. Will plan to pre medicate removal with 1mg IV dilaudid and 1mg IV lorazepam.          Spiritual Interventions:  visit offered, family undecided. Psych/ Social/ Emotional Interventions: daughter at bedside along with extensive family. Care Coordination Needs: communication between hospital and hospice team    Care plan and New Orders discussed / approved with Malissa Claire MD.    Description History and Chart Review   If this is initial GIP note must document RN assessment/MD communication in previous setting.  Specifically document nursing/medication needs in last 24 hours to support GIP care  Narrative History of last 24 hours that demonstrates care cannot be provided in another setting:  Continuous BiPAP  IV medications due to lethargy, unable to safely swallow on BiPAP    What has been done to control the patient's symptoms in the last 24 hours? PRN IV dilaudid for dyspnea until bipap removed, will then require scheduled    Does the patient currently require IV medications? yes  Does the patient currently require scheduled medications? no  Does the patient currently require a PCA? no    List number of doses of PRN medications in last 24 hours:  Medication 1: dilaudid 0,5mg  Number of doses: 3    Medication 2:   Number of doses:    Medication 3:   Number of doses:    Supporting documentation for GIP need for pain control:  [x] Frequent evaluation by a doctor, nurse practitioner, nurse   [x] Frequent medication adjustment    [x] IVs that cannot be administered at home   [x] Aggressive pain management   [x] Complicated technical delivery of medications              Supporting documentation for GIP need for symptom control:  []  Sudden decline necessitating intensive nursing intervention  []  Uncontrolled / intractable nausea or vomiting   []  Pathological fractures  []  Advanced open wounds requiring frequent skilled care  [] Unmanageable respiratory distress  [] New or worsening delirium   [] Delirium with behavior issues: Is 24 hour caregiver present due to safety concerns with agitation? (yes/no)  [] Imminent death - with skilled nursing needs documented above    DISCHARGE PLANNING   Daily discharge planning required for GIP  1. Discharge Plan: home with hospice if stable to transfer  2. Patient/Family teaching: end of life process  3.  Response to patient/family teaching: expressed understanding    ASSESSMENT    KARNOFSKY: 10%    Prognosis estimated based on 02/11/23 clinical assessment is:   [] Few to Many Hours  [x] Hours to Days   [] Few to Many Days   [] Days to Weeks   [] Few to Many Weeks   [] Weeks to Months   [] Few to Many Months    Quality Measure: Patient self-reports:  [x] Yes    [] No      CLINICAL INFORMATION   Patient Vitals for the past 12 hrs:   Temp Pulse Resp BP SpO2   02/11/23 0840 97.9 °F (36.6 °C) 60 22 (!) 101/55 100 %   02/11/23 0828 -- -- -- -- 100 %   02/11/23 0418 -- -- -- -- 100 %       Currently this patient has:  [x] Supplemental O2   [x] IV    [] PICC      [] PORT   [] NG Tube    [] PEG Tube   [] Ostomy     [] Petersen draining _______ urine  [] Other:     SIGNS/PHYSICAL FINDINGS     Skin (including wound):  [] Warm, dry, supple, intact and color normal for race  [] Warm   [] Dry   [] Cool     [] Clammy       [] Diaphoretic    Turgor   [] Normal   [] Decreased  Color:   [] Pink   [] Pale   [x] Cyanotic   [] Erythema   [] Jaundice   [] Normal for Race  []  Wounds:    Neuro:  [x] Lethargy  [] Restlessness / agitation  [] Confusion / delirium  [] Hallucinations  [] Responds to maximal stimulation  [] Unresponsive--minimally responsive  [] Seizures     Cardiac:  [] Dyspnea on Exertion  [] JVD  [] Murmur  [] Palpitations  [] Hypotension  [] Hypertension  [] Tachycardia  [] Bradycardia  [] Irregular HR  [] Pulses Decreased  [] Pulses Absent  [x] Edema:     generalized  [] Mottling:     noted on admission but improved day #2    Respiratory:  Breath sounds:    [x] Diminished   [] Wheeze   [] Rhonchi   [] Rales   [] Even and unlabored  [x] Labored:            [] Cough   [] Non Productive   [] Productive    [] Description:           [] Deep suctioned   [] O2 at ___ LPM  [] High flow oxygen greater than 10 LPM  [x] Bi-Pap    GI  [] Abdomen (describe)   [] Ascites  [] Nausea  [] Vomiting  [] Incontinent of bowels  [] Bowel sounds (yes/no)  [] Diarrhea  [] Constipation (see above including last bowel movement)  [] Checked for impaction  [] Last BM     Nutrition  Diet:__sips and bites________  Appetite:   [] Good   [] Fair   [] Poor   [] Tube Feeding     --external female catheter  [] Voiding  [] Incontinent   [] Petersen    Musculoskeletal  [] Balance/Blossom Unsteady   [] Weak   Strength:    [] Normal    [x] Limited    [] Decreasing   Activities:    [] Up as tolerated   [x] Bedridden    [] Specify:    SAFETY  [x] 24 hr. Caregiver   [x] Side rails ? [x] Hospital bed   [x] Reviewed Falls & Safety       ALLERGIES AND MEDICATIONS     Allergies: Allergies   Allergen Reactions    Cefepime Rash     tolerated nafcillin 1/2023    Nafcillin Rash    Cefazolin Rash    Other Plant, Animal, Environmental Rash     Ivory soap caused rash on hands.     Zosyn [Piperacillin-Tazobactam] Rash     Possible rash to zosyn, also received IV contrast and blood  Tolerated nafcillin before/after 1/2023       Current Facility-Administered Medications   Medication Dose Route Frequency    LORazepam (ATIVAN) injection 0.5 mg  0.5 mg IntraVENous Q15MIN PRN    ketorolac (TORADOL) injection 30 mg  30 mg IntraVENous Q8H PRN    glycopyrrolate (ROBINUL) injection 0.2 mg  0.2 mg IntraVENous Q4H PRN    bisacodyL (DULCOLAX) suppository 10 mg  10 mg Rectal DAILY PRN    HYDROmorphone (DILAUDID) injection 0.5 mg  0.5 mg IntraVENous Q15MIN PRN    sodium chloride (NS) flush 5 mL  5 mL IntraVENous PRN          Visit Time In: multiple visits throughout day  Visit Time Out:

## 2023-02-12 NOTE — PROGRESS NOTES
End of Shift Note    Bedside shift change report given to Saray Atkins (oncoming nurse) by Niki Hays RN (offgoing nurse). Report included the following information SBAR, Kardex, and MAR    Shift worked:  7 am to 7:30 pm     Shift summary and any significant changes:     Patient oriented, yet drowsy during shift. Patient on BiPap sating at 100%; however, was having episodes of shortness of breath. 3 doses of PRN IV Dilaudid administered to treat shortness of breath. Patient requesting ice water for dry mouth; hospice RN cleared patient to have ice water and several cups were provided to patient. Patient denied pain during shift. Incontinence care/purewick change and repositioning completed during shift. Applied lotion to patient's rash on chest, neck and face. IV's flushed and patent; PICC line with positive blood return. Family came and visited patient during shift. Frequent rounds and education completed.       Concerns for physician to address:       Zone phone for oncoming shift:              Niki Hays RN

## 2023-02-12 NOTE — PROGRESS NOTES
End of Shift Note    Bedside shift change report given to MARIE Armando (oncoming nurse) by Nigel Whitney RN (offgoing nurse). Report included the following information SBAR, Kardex, and MAR    Shift worked:  7p-7a     Shift summary and any significant changes:     Pt had complaints of SOB and prn medication given per MAR. Pt complained a 2nd time of SOB, her O2 level was in normal limits but could tell she was a bit anxious so gave PRN Ativan along with the IV Dilaudid medication and that seemed to help the Pt greatly, she was able to calm down enough to get some sleep. Incontinence care completed and Pt turned during shift.       Concerns for physician to address:       Zone phone for oncoming shift:              Nigel Whitney RN

## 2023-02-12 NOTE — HOSPICE
[facility]  [phone]    Social Work Admission Note  Patient Name: Hernandez Daugherty  YOB: 1960  Age: 60 y.o. Date of Visit: 02/12/23  Facility of Care: [facility]   Patient Room: 1111/01     Hospice Attending: Tim Morris MD  Hospice Diagnosis: Respiratory failure [facility]) [J96.90]    Level of Care:    [x]  GIP    []  Respite   []  Routine      NARRATIVE   LCSW called pts daughterGayle with hospice RN Jaquan Giang) to offer support and complete initial psychosocial assessment. Pts daughterGayle is aware that pt would like the bipap removed and is in agreement with this plan. Pts daughter stated she could not be at the hospital until 5 PM tonight due to transportation. RN and LCSW discussed waiting until Monday so the entire hospice team could support. Pts daughter and pt stated they were in agreement with this plan. Plan is for pts daughter, granddaughter, oldest sister and older brother to be at the hospital at 12:30 PM on Monday 2/13/23. LCSW spoke with pts daughter about her support network and she stated she had a lot of support from her family and friends. Pts daughter understood pts disease progression and what would happen when bipap was removed. Pts daughter engaged in some life review. Pts daughter is a only child of pt and they have a very close relationship. Pts daughter stated she has a 25 yr old daughter, Joselito Roy (birthday is 2/28/23) who is also extremely close with pt. Pts daughter stated pts granddaughter was coping ok at this time. 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 Pts daughter stated this took a toll on her physical health but that pt enjoyed her work. LCSW discussed SW role and support that was available. LCSW encouraged pts daughter to call with any needs and provided the name of primary SW (Marky Marti) at [facility]. LCSW went and visited with pt. Pt was alert and oriented.  Pt had no reports of pain but was thirsty. Hospice RN assisted with alerting pts nurse that she would like something to drink. Pt engaged in life review with LCSW and LCSW provided supportive listening. Pt discussed her relationship with her daughter and her granddaughter and how special it was. Pt reported they are all exactly 20 years apart and thought this was really special. Pt reported she relies heavily on her daughter for support and looked forward to her coming to be with her. Pt discussed being ready to remove the bipap and understood what this entailed. LCSW discussed legacy gifts for her daughter and granddaughter. Pt stated she would like to do 2 fingerprint necklaces for her girls. LCSW assisted with this and will submit. LCSW provided companionship and therapeutic touch. LCSW will continue to monitor and assess needs.     ADVANCE CARE PLANNING    Advance Directive:  [x]  Yes  []  No   []  Would like to complete  Primary Yeny Vizcarra (Daughter)  Secondary MPOA: Jade Lindsay (Sister)  Elizabeth Robb:   Code Status: DNR  Durable DNR: X_ Yes  _ No  Advance Care Planning 2023   Patient's Healthcare Decision Maker is: Named in scanned ACP document   Confirm Advance Directive Yes, on file   Does the patient have other document types MOST/MOLST/POST/POLST       Relationship Status:  []  Single     []        []      []  Domestic Partner     []  /  []  Common Law  []    [x]  Unknown      Bahai/Spirituality: Jewish  [x]  Active In Bahai/Spirituality   []  Not Active   []  N/A  Notes:     Home: [place]   Resources Provided:  []  [facility]  []  Information on applying for disability  []  FMLA Paperwork  []  Letters Requested by [facility]   []  [address]  []  Final Arrangements Resources   []  Outside counseling services (individual or group therapy)  []  Grief resources   []  David Morillo  []  [email or web address]   []  Luisito Her   []  [employer]   []  Referral Sent to [employer]   []  Referral Sent to Music Therapy   []  Referral Sent to Pet Therapy  [x]  Other Legacy Gift/Necklaces for daughter and granddaughter fingerprint. Social Work Initial Assessment     Sex:  female    Pronoun Preference:   []  He/Him/His   [x]  She/Her/Hers   []  They/Them/Theirs   []   Patient Name   []   Decline to Answer  []   Unknown  []   Other   Notes:     Race/Ethnicity: (мария all that apply)  []  [place]) or [place] Native  []    []  Black or [place] American  []   or   []   or [place]  [x]  White  []  Unknown  []  Other     Inpatient Financial Agreement Completed:   [x]  Yes  []  No    Insurance:   []  Medicare   []  Medicaid     [x]  [employer]    []  Self-Pay/Uninsured   Notes:      Has pt applied for FAP? []  Needs to Apply  []  Application Completed and Submitted   []  Approved/ Expiration Date:   [x]  N/A  Notes:     Has pt applied for Medicaid? []  Needs to Apply  []  Application Completed and Submitted/Application Number:   [x]  N/A  Notes:     Has a long term care screening ([facility]) been requested? []  Requested   []  Not Requested, Needs follow up  []  Completed  [x]  N/A  Notes:     Does the pt have a long term care insurance policy? []  Yes  [x]  No  []  Yes, Needing assistance with paperwork  Notes:      Service:    []  Yes   [x]  No       []  Unknown    Appropriate for Pinning Ceremony:   []  Yes      [x]  No    Is patient using VA benefits? 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     Primary Language: English  []   Needed  []   utilized during visit  []   Waived/ form completed    Ability to express thoughts/needs/feelings  [x]  Expressed thoughts/feelings/needs without difficulty  []  Requires extra time and cuing  []  Speech limited single words  []  Uses only gestures (eye, blinking eye or head movement/pointing)  []  Unable to express thoughts/feelings/needs (speech unintelligible or inappropriate)  []  Unresponsive  Notes: Pt expressed being ready to remove bipap and stated she looked forward to her daughter being with her. Mental Status:  [x]  Alert-oriented to:     [x]  Person     [x]  Place     [x]  Time  []  Comatose-responds to:    []   Verbal stimuli    []  Tactile stimuli    []  Painful stimuli  []  Forgetful  []  Disoriented/Confused  []  Lethargic  []  Agitated  []  Unresponsive  []  Other (specify):    Notes: Pt alert and oriented. Patients description of Illness/Current Health Status:  Pt is accepting and ready for bipap removal tomorrow. []  Patient unable to discuss  []  Patient unwilling to discuss  []  (Specify)        Knowledge/Understanding of Disease Process  Patient:    [x]  Demonstrates knowledge/understanding of disease process   [x]  Demonstrates knowledge/understanding of treatment plan   [x]  Demonstrates knowledge/understanding of prognosis   [x]  Demonstrates acceptance of prognosis   [x]  Demonstrates knowledge/understanding of resuscitation status   []  Other (specify)  Caregiver:   [x]  Demonstrates knowledge/understanding of disease process   [x]  Demonstrates knowledge/understanding of treatment plan   [x]  Demonstrates knowledge/understanding of prognosis   [x]  Demonstrates acceptance of prognosis   [x]  Demonstrates knowledge/understanding of resuscitation status   []  Other (specify)  Notes: Pt and pts daughter expressed understanding pts decline and disease progression. Patients living arrangement/care setting:  Use the PRIOR COLUMN when the PATIENTS current health status necessitated a change in his/her primary residence.     Prior Current Response              []             []    Patients own home/residence              [x]             []    Home of family member/friend              []             []    Boarding home/Group Home              []             []    Assisted living facility/care home center              []             []    Hospital/Acute care facility              []             []    Skilled nursing facility              []             []    Long term care facility/Nursing home              []             [x]    Hospice in Patient      Primary Caregiver:  [x]  No Primary Caregiver  Name of Primary Caregiver: GIP at [facility]  Primary Caregiver Phone Number:   Relationship or Primary Caregiver:    []  Spouse/Significant other       []  Child        []  Step child       []  Sibling   []  Parent   []  Friend/Neighbor   []  Community/Shinto Volunteer   []  Paid help   [x]  Other (specify):_____GIP at [facility] ______  Notes:  Pt is currently GIP at [facility] but was previously living with daughter. Family members/Significant others:  Name: Codie Hernandez  Relationship: Daughter   Phone Number: [phone]  Actively involved in care? [x]  Yes  []  No    Name: Enoch Rodas   Relationship: Granddaughter   Phone Number:  Actively involved in care? [x]  Yes  []  No    Social support systems: (select ONE best description)  []  Excellent social support system which includes three or more family members or friends  [x]  Good social support system which includes two or less members or friends  []  Fair social support which includes one family member or friend  []  Poor social support; no family members or friends; basically ALONE  Notes: Pts daughter reported that her daughter, uncle and aunt would come and support for the weaning.      Emotional Status: (мария all that apply)    Patient Caregiver Response                 []                []    Mood/Affect stable and appropriate                   []                []    Angry                 []                []    Anxious                 []                []    Apprehensive                 []                [] Avoidant                 []                []    Clinging                 []                []    Depressed                 []                []    Distraught                 []                []    Fearful                 []                []    Flat Affect                 []                []    Helpless                 []                []    Hostile                 []                []    Impulsive                 []                []    Irritable                 []                []    Labile                 []                []    Manic                 []                []    Restlessness                 [x]                [x]    Sad                 []                []    Strain/Stress                 []                []    Suspicious                 []                []     Tearful                 []                 []     Withdrawn          Notes: Both pts daughter and pt reported feeling sad but accepting. Coping Skills (strengths/weakness):    Patient: Coping Skills (strength/weakness): Pt appears very accepting. Pt relies heavily on her daughter for support. Family/caregiver (strength/weakness): Pts daughter reports being accepting and coping ok. Reports a strong support network. [place] of care upon discharge (мария all that apply):     [x]  No burden evident   []  Family must administer medications   []  Illness causing financial strain   []  Family/Support feels overwhelmed   []  Family/Support sleep disturbed with patients care   []  Patients care causes extra physical stress  of death  []  Illness causes changes in family lifestyle  []  Illness impacting family/support employment  []  Family experiencing increased time demands  []  Patients behavior endangers family  []  Denial of patients illness  []  Concern over outcome of illness/fear  []  Patients behavior embarrassing to family   Notes: Daughter does not have transportation.      Risk Factors: (мария all that apply):    []  No burden evident   []  Alcohol abuse  []  Financial resources inadequate to meet basic needs (food/house/etc)  []  Financial resources inadequate to meet health care needs (supplies/equipment/medications)  []  Food/nutrition resources inadequate  []  Home environment unsafe/inadequate for home care  []  Homicidal risk  []  Lives alone or without concerned relatives  []  Multiple medications/complex schedule  []  Physical limitations increase likelihood of falls  []  Plan of care/treatments complicated  []  Substance use/abuse  []  Suicidal risk  []  Visual impairment threatens safety/ability to perform self-care  [x]  Other (specify): Untimely. Abuse/Neglect (actual/potential risks):  [x]  No signs of abuse/neglect  []  History of abuse/neglect                 []  Physical          []  Sexual  []  History of domestic violence  []  Lacks adequate physical care  []  Lacks emotional nurturing/support  []  Lacks appropriate stimulation/cognitive experiences  []  Left alone inappropriately  []  Lacks necessary supervision  []  Inadequate or delayed medical care  []  Unsafe environment (i.e guns/drug use/history of violence in the home/etc.)  []  Bruising or other physical signs of injury present  []  Refer to child/adult protective services  []  Other (specify):   Notes:      Current Sources of Stress (in Addition to Current Illness):   [x]  None reported  []  Bills/Debt    []  Career/Job change    []   (short term)    []   (long term)    []  Death of a child (recent)    []  Death of a parent (recent)   []  Death of a spouse (recent)   []  Employment status changed   []  Family discord    []  Financial loss/Inadequate inther (specify):come  []  Job loss  []  Legal issues unresolved  []  Lifestyle change  []  Marital discord  []  Marriage within the last year  []  Paperwork (insurance/legal/etc) overwhelming  []  Separation/Divorce  []  Other (specify):  Notes: None reported.       Interventions/Plan of Care   []  MSW will assess social and emotional factors related to coping with end of life issues  []  MSW will provide community resource planning/referral   []  MSW to assist with relocation to different care setting if/when symptoms stabilize  [x]  Pt/Pts family will receive emotional support. [x]  Pt/Pts family will share the details surrounding the pts disease progression  [x]  Pt/Pts Family will show expression of grief by participating in life review. [x]  Pt/Pts Family will be educated and able to verbalize understanding of mental, emotional, and physical symptoms of grief. []  Pt/Pts Family will be educated and able to identify skills and social support to help cope with grief. []  Pts family will receive support for grief with emphasis on developmentally appropriate language. [] Other:       Discharge Planning   [x]  Home with family member    []  Return back to nursing home/facility  []  Needs assistance with placement/paid caregivers  []  Short Stay under routine level of care at [facility]   []  Other  Notes: Pt currently at [facility] level of care. Pt would like to return home with daughter if stabilized.     MSW Assessment Completed by: Asha Villasenor LCSW  02/12/23    Time In:2:00 PM       Time Out:3:00 PM

## 2023-02-12 NOTE — PROGRESS NOTES
[address] [facility] to Those in Need  [phone]    Patient Name: Dyana Spangler  YOB: 1960    Date of Provider Hospice Visit: 02/12/23    Level of Care:   [x] General Inpatient (GIP)    [] Routine   [] Respite    Current Location of Care:  [] [facility] [] [facility] [x] [facility] [] [facility] [] [facility], patient referred from:  [] [facility] [] [facility] [] [facility] [] [facility] [] Home [] Other:     Date of Original Hospice Admission: 2/10/2023  Hospice Medical Director at time of admission: Dr. Solorio July Diagnosis: Respiratory failure  Diagnoses RELATED to the terminal prognosis: COPD, hypercapnea, OHS, JOSLYN, diastolic heart failure, septic shock, MSSA bacteremia, leukocytosis, left femoral neck fracture, anemia  Other Diagnoses:  T2DM, CAD, PAfib, severe obesity     HOSPICE SUMMARY        Dyana Spangler is a 60y.o. year old who was admitted to [facility]. Patient came to the hospital on 1/21/2023. She came in after routine lab work showed a hgb of 5.3. She was asymptomatic but did report her was having lower BP's at home. Lactic acid 3.86 and improved with IVF and blood transfusion. She was admitted with anemia and a h/o bleeding duodenal ulcers and gastritis. EGD showed duodenitis/gastritis. Patient's respiratory status has declined during hospitalization and she is now on BiPAP with 90% oxygen. She has worsening hypercapnea every time the team tries to taper the oxygen and worsening hypoxia. She also was treated for MSSA bacteremia and septic shock and has been on daptomycin. She has a left femoral neck fracture with no interventions per ortho. The patient's principle diagnosis has resulted in obtundation, respiratory distress. Functionally, the patient's Karnofsky and/or Palliative Performance Scale has declined over a period of days and is estimated at 10%.  The patient is dependent on the following ADLs:  All    Objective information that support this patients limited prognosis includes:  inability to taper off bipap due to end stage copd. The patient/family chose comfort measures with the support of Hospice. Pt's next of kin is dtr Adama Cartwright. Pt was living w/ her before admission. Has been bed bound x 3 years. HOSPICE DIAGNOSES   Active Symptoms:  1. Respiratory distress  2. End stage COPD  3. Obtundation   4. Hospice care patient     PLAN   Admit to [facility] LOC. Patient requires frequent nursing assessments and IV Medication administration for symptom control. Upon admission to Hospice pt was minimally responsive, but yesterday was fully alert/oriented and able to have good visits from multiple family members incl her dtr Adama Cartwright. Therefore, decision to remove BIPAP was deferred until today. Meet w/ pt mj w/ Vin Nolasco RN and Petey Miramontes RN. She is alert/oriented, her O2 sats drop when BIPAP removed even for sips of water. Pt and dtr Kareen (on phone) aware that when mask removed, life will be quite limited and we will need to use medication for SOB/anxiety and pain that will have unintended side effect of sedation, so she likely will not be awake/alert. They are in agreement on plan, but timing depends on when Adama Cartwright can get a ride to the hospital today. Pt said Adama Cartwright did not have to be here when mask removed, but Adama Cartwright would like to be- and pt in agreement. For now continue prn Dilaudid 0.5mg IV prn pain (pt w/ pain w/ any movement) and SOB, as well as Ativan 0.5mg IV prn anxiety. Will allow increased doses of medications prn when pt and dtr ready to make transition off BIPAP.  and SW to support family needs  Disposition: likely to die in hospital  Hospice Plan of care was reviewed in detail and agree with current plan of care    Prognosis estimated based on 02/12/23 clinical assessment is:   [x] Hours to Days    [] Days to Weeks    [] Other:    Communicated plan of care with: Hospice Case Manager;  Hospice IDT; Care Team     GOALS OF CARE     Patient/Medical POA stated Goal of Care: comfort care/hospice care    [x] I have reviewed and/or updated ACP information in the Advance Care Planning Navigator. This information is available in the [address] link in the patient's chart header. Primary Decision [facility] Agent):   Primary Decision Maker: Nathalia Jose - Child - [phone]    Secondary Decision Maker: Donovan Brewer - Sister - [phone]    Resuscitation Status: DNR  If DNR is there a Durable DNR on file? : [x] Yes [] No (If no, complete Durable DNR)    HISTORY     History obtained from: chart review and IDT    CHIEF COMPLAINT:    The patient is:   [x] Verbal  [] Nonverbal  [] Unresponsive    HPI/SUBJECTIVE:  Pt awake/alert on BIPAP, very thirsty.       REVIEW OF SYSTEMS     The following systems were: [x] reviewed  [] unable to be reviewed    Positive ROS include:  Constitutional: fatigue, weakness, in pain, short of breath  Ears/nose/mouth/throat: increased airway secretions  Respiratory:shortness of breath, wheezing  Gastrointestinal:poor appetite, nausea, vomiting, abdominal pain, constipation, diarrhea  Musculoskeletal:pain, deformities, swelling legs  Neurologic:confusion, hallucinations, weakness  Psychiatric:anxiety, feeling depressed, poor sleep  Endocrine:     Adult Non-Verbal Pain Assessment Score: 2/10    Face  [x] 0   No particular expression or smile  [] 1   Occasional grimace, tearing, frowning, wrinkled forehead  [] 2   Frequent grimace, tearing, frowning, wrinkled forehead    Activity (movement)  [x] 0   Lying quietly, normal position  [] 1   Seeking attention through movement or slow, cautious movement  [] 2   Restless, excessive activity and/or withdrawal reflexes    Guarding  [x] 0   Lying quietly, no positioning of hands over areas of body  [] 1   Splinting areas of the body, tense  [] 2   Rigid, stiff    Physiology (vital signs)  [] 0   Stable vital signs  [x] 1   Change in any of the following: SBP > 20mm Hg; HR > 20/minute  [] 2   Change in any of the following: SBP > 30mm Hg; HR > 25/minute    Respiratory  [] 0   Baseline RR/SpO2, compliant with ventilator  [x] 1   RR > 10 above baseline, or 5% drop SpO2, mild asynchrony with ventilator  [] 2   RR > 20 above baseline, or 10% drop SpO2, asynchrony with ventilator     FUNCTIONAL ASSESSMENT     Palliative Performance Scale (PPS):10%-20       PSYCHOSOCIAL/SPIRITUAL ASSESSMENT     Active Problems:    A-fib ([address] (11/4/2020)      Morbid obesity ([address] (11/15/2020)      Acute respiratory failure ([address] (11/18/2020)      JOSLYN (obstructive sleep apnea) (11/20/2020)      Acute on chronic respiratory failure with hypoxia and hypercapnia (HCC) (11/20/2020)      Heart failure ([address] (4/21/2022)      Hypoxia (4/21/2022)      COPD exacerbation ([address] (4/21/2022)      Symptomatic anemia (1/22/2023)      Respiratory failure ([address] (2/10/2023)      MSSA bacteremia (2/11/2023)      Septic shock with acute organ dysfunction due to methicillin susceptible Staphylococcus aureus (MSSA) ([address] (2/11/2023)      Left displaced femoral neck fracture ([address] (2/11/2023)      DM2 (diabetes mellitus, type 2) ([address] (2/11/2023)      CAD (coronary artery disease) (2/11/2023)    Past Medical History:   Diagnosis Date    Acute on chronic respiratory failure with hypoxia and hypercapnia ([address] 11/20/2020    Atrial fibrillation ([address] 11/4/2020    CHF (congestive heart failure) ([address]     Diabetes ([address]     Hypertension     Ill-defined condition     high cholesterol    Ill-defined condition     tachycardia    JOSLYN (obstructive sleep apnea) 11/20/2020    Other and unspecified symptoms and signs involving general sensations and perceptions     ruptured disc    S/P cardiac cath 11/4/2020    11/3/2020 nonobstructive disease      Past Surgical History:   Procedure Laterality Date    CO COMPRE ELECTROPHYSIOL XM W/LEFT ATRIAL PACNG/REC N/A 11/23/2020    Lt Atrial Pace & Record During Ep Study performed by Yokasta Morales MD at [facility] CARDIAC CATH LAB    NM COMPRE EP EVAL ABLTJ 3D MAPG TX SVT N/A 11/23/2020    ABLATION A-FLUTTER performed by Yokasta Morales MD at [facility] CARDIAC CATH LAB    NM ICAR CATHETER ABLATION ARRHYTHMIA ADD ON N/A 11/23/2020    Ablation Svt/Vt Add On performed by Yokasta Morales MD at [address] CARDIAC CATH LAB    NM INTRACARDIAC ELECTROPHYSIOLOGIC 3D MAPPING N/A 11/23/2020    Ep 3d Mapping performed by Yokasta Morales MD at [facility] CARDIAC CATH LAB    NM PROGRAMMED STIMJ & PACG AFTER IV DRUG NFS N/A 11/23/2020    Drug Stimulation performed by Yokasta Morales MD at [facility] CARDIAC CATH LAB      Social History     Tobacco Use    Smoking status: Former    Smokeless tobacco: Never   Substance Use Topics    Alcohol use: No     No family history on file. Allergies   Allergen Reactions    Cefepime Rash     tolerated nafcillin 1/2023    Nafcillin Rash    Cefazolin Rash    Other Plant, Animal, Environmental Rash     Ivory soap caused rash on hands.     Zosyn [Piperacillin-Tazobactam] Rash     Possible rash to zosyn, also received IV contrast and blood  Tolerated nafcillin before/after 1/2023      Current Facility-Administered Medications   Medication Dose Route Frequency    LORazepam (ATIVAN) injection 0.5 mg  0.5 mg IntraVENous Q15MIN PRN    ketorolac (TORADOL) injection 30 mg  30 mg IntraVENous Q8H PRN    glycopyrrolate (ROBINUL) injection 0.2 mg  0.2 mg IntraVENous Q4H PRN    bisacodyL (DULCOLAX) suppository 10 mg  10 mg Rectal DAILY PRN    HYDROmorphone (DILAUDID) injection 0.5 mg  0.5 mg IntraVENous Q15MIN PRN    sodium chloride (NS) flush 5 mL  5 mL IntraVENous PRN        PHYSICAL EXAM     Wt Readings from Last 3 Encounters:   02/06/23 244 lb (110.7 kg)   06/15/22 301 lb 9.6 oz (136.8 kg)   05/16/22 298 lb 11.6 oz (135.5 kg)       Visit Vitals  BP (!) 104/49   Pulse 68   Temp 97.2 °F (36.2 °C)   Resp 20   SpO2 96%       Supplemental O2 at 90% with Bipap    [x] Yes  [] NO  Last bowel movement: N/A    Currently this patient has:  [x] Peripheral IV [] PICC  [] PORT [] ICD    [] Petersen Catheter [] NG Tube   [] PEG Tube    [] Rectal Tube [] Drain  [] Other:     Constitutional:  Patient wearing bipap and awake/alert fully   Eyes: open, sclera clear  ENMT: dry MM   Cardiovascular:  regular , 2+ edema of LE's  Respiratory: + increased wob  Gastrointestinal: no distention  Musculoskeletal: no deformities or contractures  Skin: warm and dry, erythema of forearms  Neurologic:  alert       Pertinent Lab and or Imaging Tests:  Lab Results   Component Value Date/Time    Sodium 136 02/10/2023 01:34 AM    Potassium 4.5 02/10/2023 01:34 AM    Chloride 98 02/10/2023 01:34 AM    CO2 34 (H) 02/10/2023 01:34 AM    Anion gap 4 (L) 02/10/2023 01:34 AM    Glucose 94 02/10/2023 01:34 AM    BUN 18 02/10/2023 01:34 AM    Creatinine 1.16 (H) 02/10/2023 01:34 AM    BUN/Creatinine ratio 16 02/10/2023 01:34 AM    GFR est AA >60 06/16/2022 06:09 AM    GFR est non-AA >60 06/16/2022 06:09 AM    Calcium 7.9 (L) 02/10/2023 01:34 AM     Lab Results   Component Value Date/Time    Protein, total 7.0 02/10/2023 01:34 AM    Albumin 1.9 (L) 02/10/2023 01:34 AM

## 2023-02-12 NOTE — HOSPICE
[facility]  [phone]    Nursing Note   Patient Name: Hernandez Daugherty  YOB: 1960  Age: 60 y.o. [facility] RN Note:      Spoke with Edda/daughter by phone about when she would be here. She stated that it would be late. Reviewed that it would be too late to do BiPAP removal.  New plan has been arranged for tomorrow, Monday 2/13, at 12:30pm.  Suyapa Sagastume stated she would bring her daughter (25years old) to be with her. This information will be provided to the hospice team who will be here tomorrow.

## 2023-02-13 NOTE — HOSPICE
[facility] RN note:  Pt  peacefully with daughter Phi Stinson, grand daughter and sister at bedside. TOD 1635,  jorje, hospice chaplain present at bedside for removal of BiPAP. JHONATAN Brooks to serve. Reportedly both daughter Phi Stinson and grand daughter suffer from anxiety and depression, grand daughter sees a counselor. Dtr expressed concerns about finances, needing to grieve and also get a job given that Phi Stinson was pt's primary care giver for years. Mod bereavement risk. [facility]  [phone]    Discharge/Death Nursing Note   Patient Name: Laura Landa  YOB: 1960  Age: 60 y.o. Date of Death: 23  Admitted Date: 2/10/2023  Time of Death: 16:35    Facility of Care: [facility]  Level of Care: [facility]  Patient Room: [address] [place] [address]     Hospice Attending: Flora Mccain MD  Hospice Diagnosis: Respiratory failure [facility]) [J96.90]    Death Pronouncement   Pronouncement of death completed by: [id number] [facility] staff was present at the time of death    At the time of death the patient was documented as:     The pt  within [facility]    The following were notified of the patient's death: hospital , hospice team    Medications were disposed of per facility protocol     Discharge Summary   Discharge Reason: Death    Summary of Care Provided:    [x] Post mortem care provided by staff RN  [x] Notification of  home by JHONATAN brooks  [] Referrals/Community resources provided:   [] Goals completed  [] Durable Medical Equipment vendor notified     Disciplines involved: [x] RN [x] SW [x]  [] VERNON [] Vol [] PT [] OT [] ST [] BC    [] IDT communication/notification    Attending Physician, Dr. Shruti Mejia, notified of death    Bereaved   Verify bereaved identified with name, address, telephone number and risk level      Advance Care Planning 2023   Patient's Healthcare Decision Maker is: Named in scanned ACP document   Confirm Advance Directive Yes, on file   Does the patient have other document types MOST/MOLST/POST/POLST

## 2023-02-13 NOTE — PROGRESS NOTES
Pt had family in at start of shift. Pt and family/friends were talking and saying some goodbyes. Pt kept comfortable during night with medication given per MAR, see MAR. Pt stated she knew it was getting harder to breathe and was looking forward to resting. Pt and this RN had a good conversation and Pt was able to relax and get some rest. Safety rounding complete during shift.     7p-7a shift  Ananya Hanna RN

## 2023-02-13 NOTE — HOSPICE
[facility] RN note:  Pt c/o back pain and dyspnea after bath. PRN dose of IV dilaudid 0.5mg given x 1 with some relief. Family panning to visit around 12:30 prior to removal of BIPAP. Reviewed sequence of events with pt to which she nodded in agreement. Resp therapist aware and will be present around 1:00 after pre medication with IV lorazepam and dilaudid. 11:30---family present at bedside. Aura Kleinor present at family request to support and pray with family. 12:15--bipap removed after visit with  and premedication. Pt agonal, family grieving appropriately, telling stories, listening to pt's favorite music. Thank you for the opportunity to care for this pt and family. Please contact hospice at [phone] with any questions or concerns.

## 2023-02-13 NOTE — HOSPICE
[facility]  [phone]    [facility] Daily Nursing Note   Patient Name: Oleg Dyson  YOB: 1960  Age: 60 y.o. Date of Visit: 02/12/23  Facility of Care: [facility]  Patient Room: [facility]/     Hospice Attending: Seth Moran MD  Hospice Diagnosis: Respiratory failure [facility]) [J96.90]    Level of Care: GIP    Current GIP Symptoms    1. Respiratory distress  2. Anxiety  3. Hospice care patient    ASSESSMENT & PLAN   1. Pt is on BiPAP. Has been receiving PRN dilaudid to help with comfort of breathing. Plan is for BiPAP removal tomorrow at 12:30pm with family present. 2. Patient requesting PRN lorazepam for anxiety related to work-of-breathing. 3. Patient is requiring frequent assessments by skilled medical staff. Spiritual Interventions: Asked patient if she needed a  visit and she stated no. Informed that [facility] are available 24/7     Psych/Social/Emotional Interventions: Call placed to Edda/daughter to provide update on patient's desire to have BiPAP removed with her present. Plan is for Adriana Barth to be present Monday at 12:30pm. SW from hospice team will be present. Care Coordination Needs: Communication with hospital staff and hospice team     Care plan and New Orders discussed / approved with Gerhardt Murders MD.    Description History and Chart Review     Narrative History of last 24 hours that demonstrates care cannot be provided in another setting:    Pt requiring frequent assessments by skilled medical staff with administration of IV medications along with adjustments in medication dosage. This care cannot be provided outside the inpatient setting. What has been done to control the patient's symptoms in the last 24 hours? PRN doses of IV dilaudid and IV lorazepam administered    Does the patient currently require IV medications?  yes  Does the patient currently require scheduled medications? no  Does the patient currently require a PCA? no    List number of doses of PRN medications in last 24 hours:  Medication 1: dilaudid  Number of doses: 4    Medication 2: lorazepam  Number of doses: 3    Medication 3:   Number of doses:    Supporting documentation for GIP need for pain control:  [x] Frequent evaluation by a doctor, nurse practitioner, nurse   [x] Frequent medication adjustment    [x] IVs that cannot be administered at home   [] Aggressive pain management   [x] Complicated technical delivery of medications              Supporting documentation for GIP need for symptom control:  [x]  Sudden decline necessitating intensive nursing intervention  []  Uncontrolled / intractable nausea or vomiting   []  Pathological fractures  []  Advanced open wounds requiring frequent skilled care  [x] Unmanageable respiratory distress  [] New or worsening delirium   [] Delirium with behavior issues: Is 24 hour caregiver present due to safety concerns with agitation? (yes/no)  [] Imminent death - with skilled nursing needs documented above    DISCHARGE PLANNING     1. Discharge Plan: If patient stabilizes, she will return home with family  2. Patient/Family teaching: Patient educated on removal of BiPAP along with use of comfort medications to help with respiratory distress  3. Response to patient/family teaching: verbalized understanding    ASSESSMENT    KARNOFSKY: 10%    Prognosis estimated based on 02/12/23 clinical assessment is:   [] Few to Many Hours  [x] Hours to Days   [] Few to Many Days   [] Days to Weeks   [] Few to Many Weeks   [] Weeks to Months   [] Few to Many Months    Quality Measure: Patient self-reports:  [x] Yes    [] No    ESAS:   Time of Assessment: 1200  Pain (1-10): 0  Fatigue (1-10): 8  Shortness of breath (1-10): 10  Nausea (1-10): Appetite (1-10):    Anxiety: (1-10): 5  Depression: (1-10):   Well-being: (1-10):   Constipation:   LAST BM:     CLINICAL INFORMATION   Patient Vitals for the past 12 hrs:   SpO2 02/12/23 2053 98 %   02/12/23 1612 100 %   02/12/23 1228 100 %       Currently this patient has:  [] Supplemental O2   [] IV    [] PICC      [] PORT   [] NG Tube    [] PEG Tube   [] Ostomy     [] Petersen draining _______ urine  [x] Other: BiPAP    SIGNS/PHYSICAL FINDINGS     Skin (including wound):  [] Warm, dry, supple, intact and color normal for race  [] Warm   [] Dry   [x] Cool     [] Clammy       [] Diaphoretic    Turgor   [] Normal   [x] Decreased  Color:   [] Pink   [x] Pale   [] Cyanotic   [] Erythema   [] Jaundice   [] Normal for Race  [x]  Wounds:    Neuro:  [x] Lethargy  [] Restlessness / agitation  [] Confusion / delirium  [] Hallucinations  [] Responds to maximal stimulation  [] Unresponsive  [] Seizures     Cardiac:  [] Dyspnea on Exertion  [] JVD  [] Murmur  [] Palpitations  [x] Hypotension  [] Hypertension  [] Tachycardia  [] Bradycardia  [] Irregular HR  [] Pulses Decreased  [] Pulses Absent  [] Edema:         [] Mottling:         Respiratory:  Breath sounds:    [x] Diminished   [] Wheeze   [] Rhonchi   [] Rales   [] Even and unlabored  [x] Labored:    20      [] Cough   [] Non Productive   [] Productive    [] Description:           [] Deep suctioned   [] O2 at ___ LPM  [] High flow oxygen greater than 10 LPM  [x] Bi-Pap    GI  [] Abdomen  [] Ascites  [] Nausea  [] Vomiting  [x] Incontinent of bowels  [] Bowel sounds   [] Diarrhea  [] Constipation (see above including last bowel movement)  [] Checked for impaction  [] Last BM     Nutrition  Diet:__bites & sips___  Appetite:   [] Good   [] Fair   [] Poor   [] Tube Feeding       [x] Voiding  [] Incontinent   [] Petersen    Musculoskeletal  [] Balance/[place] Unsteady   [] Weak   Strength:    [] Normal    [] Limited    [x] Decreasing   Activities:    [] Up as tolerated   [x] Bedridden    [] Specify:    SAFETY  [x] 24 hr. Caregiver   [x] Side rails ? [x] Hospital bed   [x] Reviewed Falls & Safety       ALLERGIES AND MEDICATIONS     Allergies:    Allergies Allergen Reactions    Cefepime Rash     tolerated nafcillin 1/2023    Nafcillin Rash    Cefazolin Rash    Other Plant, Animal, Environmental Rash     Ivory soap caused rash on hands.     Zosyn [Piperacillin-Tazobactam] Rash     Possible rash to zosyn, also received IV contrast and blood  Tolerated nafcillin before/after 1/2023       Current Facility-Administered Medications   Medication Dose Route Frequency    HYDROmorphone (DILAUDID) injection 1-2 mg  1-2 mg IntraVENous Q15MIN PRN    LORazepam (ATIVAN) injection 2 mg  2 mg IntraVENous Q15MIN PRN    LORazepam (ATIVAN) injection 0.5 mg  0.5 mg IntraVENous Q15MIN PRN    glycopyrrolate (ROBINUL) injection 0.2 mg  0.2 mg IntraVENous Q4H PRN    bisacodyL (DULCOLAX) suppository 10 mg  10 mg Rectal DAILY PRN    HYDROmorphone (DILAUDID) injection 0.5 mg  0.5 mg IntraVENous Q15MIN PRN    sodium chloride (NS) flush 5 mL  5 mL IntraVENous PRN          Visit Time In: 1200, 1400  Visit Time Out: 1230, 1420

## 2023-02-13 NOTE — HOSPICE
[facility]  Death Visit Note:    Narrative: Pt's brother notified this LMSW and Chris Pinto NP. Family was gathered at bedside and grieving appropriately. Family grateful they were present for pt's peaceful death.   Final Arrangements: JHONATAN HIGGINS  Resources Needed/Provided: Emotional support  Bereavement Services Explained: Yes    Rene Gonzalez, [facility]    [phone]

## 2023-02-13 NOTE — PROGRESS NOTES
LMSW visited with pt, daughter Arthur, granddaughter Sukhi Mackey, sister Gena Reese, and brother at bedside. Pt was weaned from BIPAP to nasal canula. Family was tearful but accepting and supporting one another. No needs at this time. LMSW provided emotional support. LMSW will continue to provide support.

## 2023-02-13 NOTE — PROGRESS NOTES
[address] [facility] Help to Those in Need  [phone]    Patient Name: Pedro Pablo Borden  YOB: 1960    Date of Provider Hospice Visit: 02/13/23    Level of Care:   [x] General Inpatient (GIP)    [] Routine   [] Respite    Current Location of Care:  [] [facility] [] [facility] [x] [id number] [] [facility] [] Hospice [facility], patient referred from:  [] [facility] [] [facility] [] [address] [] [facility] [] Home [] Other:     Date of Original Hospice Admission: 2/10/2023  Hospice Medical Director at time of admission: Dr. Rosanna Noble Diagnosis: Respiratory failure  Diagnoses RELATED to the terminal prognosis: COPD, hypercapnea, OHS, JOSLYN, diastolic heart failure, septic shock, MSSA bacteremia, leukocytosis, left femoral neck fracture, anemia  Other Diagnoses:  T2DM, CAD, PAfib, severe obesity     HOSPICE SUMMARY        Pedro Pablo Borden is a 60y.o. year old who was admitted to [facility]. Patient came to the hospital on 1/21/2023. She came in after routine lab work showed a hgb of 5.3. She was asymptomatic but did report her was having lower BP's at home. Lactic acid 3.86 and improved with IVF and blood transfusion. She was admitted with anemia and a h/o bleeding duodenal ulcers and gastritis. EGD showed duodenitis/gastritis. Patient's respiratory status has declined during hospitalization and she is now on BiPAP with 90% oxygen. She has worsening hypercapnea every time the team tries to taper the oxygen and worsening hypoxia. She also was treated for MSSA bacteremia and septic shock and has been on daptomycin. She has a left femoral neck fracture with no interventions per ortho. The patient's principle diagnosis has resulted in obtundation, respiratory distress. Functionally, the patient's Karnofsky and/or Palliative Performance Scale has declined over a period of days and is estimated at 10%.  The patient is dependent on the following ADLs:  All    Objective information that support this patients limited prognosis includes:  inability to taper off bipap due to end stage copd. The patient/family chose comfort measures with the support of Hospice. Pt's next of kin is dtr Genaro Saavedra. Pt was living w/ her before admission. Has been bed bound x 3 years. HOSPICE DIAGNOSES   Active Symptoms:  1. Respiratory distress  2. End stage COPD  3. Obtundation   4. Hospice care patient  5. Pain  6. Periods of apnea     PLAN   Continues to meet criteria for GIP LOC. Patient requires frequent nursing assessments and IV Medication administration for symptom control. Patient had bipap tapered at midday today and oxygen removed 30 minutes ago. Had 2 mg of ativan and 2 mg of dilaudid before bipap removed. Hydromorphone 1 mg IV every 4 hours and also prn every 15 minutes prn respiratory distress/pain  Lorazepam 1 mg IV every 4 hours and every 15 minutes prn anxiety/agitation/respiratory distress. Robinul 0.2 mg IV every 4 hours prn secretions. Ketorolac 30 mg IV every 8 hours prn fever  Petersen prn bladder distention   and SW to support family needs  Disposition: likely to die in hospital  Hospice Plan of care was reviewed in detail and agree with current plan of care    Prognosis estimated based on 02/13/23 clinical assessment is:   [x] Hours to Days    [] Days to Weeks    [] Other:    Communicated plan of care with: Hospice Case Manager; Hospice IDT; Care Team     GOALS OF CARE     Patient/Medical POA stated Goal of Care: comfort care/hospice care    [x] I have reviewed and/or updated ACP information in the Advance Care Planning Navigator. This information is available in the [address] link in the patient's chart header.     Primary Decision [facility] ([address]):   Primary Decision Maker: Temitope Gross Child - [phone]    Secondary Decision Maker: Stacey Hutson - Sister - [phone]    Resuscitation Status: DNR  If DNR is there a Durable DNR on file? : [x] Yes [] No (If no, complete Durable DNR)    HISTORY     History obtained from: chart review and IDT    CHIEF COMPLAINT:    The patient is:   [x] Verbal  [] Nonverbal  [] Unresponsive    HPI/SUBJECTIVE:  Pt awake/alert on BIPAP, very thirsty. 2/13/2023:  Patient is unresponsive since bipap removed with periods of apnea up to 20 seconds. Agonal breathing. Family at bedside.        REVIEW OF SYSTEMS     The following systems were: [] reviewed  [x] unable to be reviewed    Positive ROS include:  Constitutional: fatigue, weakness, in pain, short of breath  Ears/nose/mouth/throat: increased airway secretions  Respiratory:shortness of breath, wheezing  Gastrointestinal:poor appetite, nausea, vomiting, abdominal pain, constipation, diarrhea  Musculoskeletal:pain, deformities, swelling legs  Neurologic:confusion, hallucinations, weakness  Psychiatric:anxiety, feeling depressed, poor sleep  Endocrine:     Adult Non-Verbal Pain Assessment Score: 2/10    Face  [x] 0   No particular expression or smile  [] 1   Occasional grimace, tearing, frowning, wrinkled forehead  [] 2   Frequent grimace, tearing, frowning, wrinkled forehead    Activity (movement)  [x] 0   Lying quietly, normal position  [] 1   Seeking attention through movement or slow, cautious movement  [] 2   Restless, excessive activity and/or withdrawal reflexes    Guarding  [x] 0   Lying quietly, no positioning of hands over areas of body  [] 1   Splinting areas of the body, tense  [] 2   Rigid, stiff    Physiology (vital signs)  [] 0   Stable vital signs  [x] 1   Change in any of the following: SBP > 20mm Hg; HR > 20/minute  [] 2   Change in any of the following: SBP > 30mm Hg; HR > 25/minute    Respiratory  [] 0   Baseline RR/SpO2, compliant with ventilator  [x] 1   RR > 10 above baseline, or 5% drop SpO2, mild asynchrony with ventilator  [] 2   RR > 20 above baseline, or 10% drop SpO2, asynchrony with ventilator     FUNCTIONAL ASSESSMENT Palliative Performance Scale (PPS):10%       PSYCHOSOCIAL/SPIRITUAL ASSESSMENT     Active Problems:    A-fib (HCC) (11/4/2020)      Morbid obesity ([address] (11/15/2020)      Acute respiratory failure ([facility]) (11/18/2020)      JOSLYN (obstructive sleep apnea) (11/20/2020)      Acute on chronic respiratory failure with hypoxia and hypercapnia ([facility]) (11/20/2020)      Heart failure ([address] (4/21/2022)      Hypoxia (4/21/2022)      COPD exacerbation ([facility]) (4/21/2022)      Symptomatic anemia (1/22/2023)      Respiratory failure ([address] (2/10/2023)      MSSA bacteremia (2/11/2023)      Septic shock with acute organ dysfunction due to methicillin susceptible Staphylococcus aureus (MSSA) ([address] (2/11/2023)      Left displaced femoral neck fracture ([address] (2/11/2023)      DM2 (diabetes mellitus, type 2) ([address] (2/11/2023)      CAD (coronary artery disease) (2/11/2023)    Past Medical History:   Diagnosis Date    Acute on chronic respiratory failure with hypoxia and hypercapnia ([address] 11/20/2020    Atrial fibrillation ([address] 11/4/2020    CHF (congestive heart failure) ([address]     Diabetes ([address]     Hypertension     Ill-defined condition     high cholesterol    Ill-defined condition     tachycardia    JOSLYN (obstructive sleep apnea) 11/20/2020    Other and unspecified symptoms and signs involving general sensations and perceptions     ruptured disc    S/P cardiac cath 11/4/2020    11/3/2020 nonobstructive disease      Past Surgical History:   Procedure Laterality Date    AR COMPRE ELECTROPHYSIOL XM W/LEFT ATRIAL PACNG/REC N/A 11/23/2020    Lt Atrial Pace & Record During Ep Study performed by Jud Oglesby MD at [facility] CARDIAC CATH LAB    AR COMPRE EP EVAL ABLTJ 3D MAPG TX SVT N/A 11/23/2020    ABLATION A-FLUTTER performed by Jud Oglesby MD at [place] [address]. ADD ON N/A 11/23/2020    Ablation Svt/Vt Add On performed by Jud Oglesby MD at [facility] CARDIAC CATH LAB    AR INTRACARDIAC ELECTROPHYSIOLOGIC 3D MAPPING N/A 11/23/2020    Ep 3d Mapping performed by Nicholas Hilliard MD at [facility] CARDIAC CATH LAB    PA PROGRAMMED STIMJ & PACG AFTER IV DRUG NFS N/A 11/23/2020    Drug Stimulation performed by Nicholas Hilliard MD at [facility] CARDIAC CATH LAB      Social History     Tobacco Use    Smoking status: Former    Smokeless tobacco: Never   Substance Use Topics    Alcohol use: No     No family history on file. Allergies   Allergen Reactions    Cefepime Rash     tolerated nafcillin 1/2023    Nafcillin Rash    Cefazolin Rash    Other Plant, Animal, Environmental Rash     Ivory soap caused rash on hands.     Zosyn [Piperacillin-Tazobactam] Rash     Possible rash to zosyn, also received IV contrast and blood  Tolerated nafcillin before/after 1/2023      Current Facility-Administered Medications   Medication Dose Route Frequency    HYDROmorphone (DILAUDID) injection 1-2 mg  1-2 mg IntraVENous Q15MIN PRN    LORazepam (ATIVAN) injection 2 mg  2 mg IntraVENous Q15MIN PRN    LORazepam (ATIVAN) injection 0.5 mg  0.5 mg IntraVENous Q15MIN PRN    glycopyrrolate (ROBINUL) injection 0.2 mg  0.2 mg IntraVENous Q4H PRN    bisacodyL (DULCOLAX) suppository 10 mg  10 mg Rectal DAILY PRN    HYDROmorphone (DILAUDID) injection 0.5 mg  0.5 mg IntraVENous Q15MIN PRN    sodium chloride (NS) flush 5 mL  5 mL IntraVENous PRN        PHYSICAL EXAM     Wt Readings from Last 3 Encounters:   02/06/23 110.7 kg (244 lb)   06/15/22 136.8 kg (301 lb 9.6 oz)   05/16/22 135.5 kg (298 lb 11.6 oz)       Visit Vitals  BP (!) 88/68   Pulse 74   Temp 97.3 °F (36.3 °C)   Resp 23   SpO2 90%       Supplemental O2 at 90% with Bipap    [x] Yes  [] NO  Last bowel movement: N/A    Currently this patient has:  [x] Peripheral IV [] PICC  [] PORT [] ICD    [] Petersen Catheter [] NG Tube   [] PEG Tube    [] Rectal Tube [] Drain  [] Other:     Constitutional:  Patient unresponsive in bed with agonal breathing and periods of apnea up to 20 seconds  Eyes: closed  ENMT: dry MM   Cardiovascular:  regular, 2+ edema of LE's  Respiratory: + increased wob, agonal, rhonchi scattered, apnea up to 20 seconds  Gastrointestinal: no distention  Musculoskeletal: no deformities or contractures  Skin: warm and dry, erythema of forearms, no mottling  Neurologic:unresponsive      Pertinent Lab and or Imaging Tests:  Lab Results   Component Value Date/Time    Sodium 136 02/10/2023 01:34 AM    Potassium 4.5 02/10/2023 01:34 AM    Chloride 98 02/10/2023 01:34 AM    CO2 34 (H) 02/10/2023 01:34 AM    Anion gap 4 (L) 02/10/2023 01:34 AM    Glucose 94 02/10/2023 01:34 AM    BUN 18 02/10/2023 01:34 AM    Creatinine 1.16 (H) 02/10/2023 01:34 AM    BUN/Creatinine ratio 16 02/10/2023 01:34 AM    GFR est AA >60 06/16/2022 06:09 AM    GFR est non-AA >60 06/16/2022 06:09 AM    Calcium 7.9 (L) 02/10/2023 01:34 AM     Lab Results   Component Value Date/Time    Protein, total 7.0 02/10/2023 01:34 AM    Albumin 1.9 (L) 02/10/2023 01:34 AM           Shade Pierson, MOISE

## 2023-02-13 NOTE — DEATH NOTE
[facility]  Provider Death Note    Called to examine patient who has . No response to verbal and tactile stimuli. No respiratory effort. Absent heart sounds and pulses. Pupils fixed and dilated. Patient pronounced dead at 26. Yeimi Pierson NP

## 2023-02-13 NOTE — HOSPICE
This was an initial visit to assess needs and offer support yo pt and family. Upon my arrival pt was in bed and appeared to be resting comfortably. Her daughter, granddaughter, oldest sister and youngest brother were at bedside. Family was supportive and interactive of pt and each other. Affirmed their desire to honor her wishes around end of life care and their expressed desire that she not suffer. They noted they are a close family and family has been here over the past several days. They are [place] and are leaning on their rosalina. In the words of her granddaughter, they are asking and believing God to take good care of her. Prayers were offered at bedside. Thanks for the opportunity to  to this family.

## 2023-02-13 NOTE — PROGRESS NOTES
End of Shift Note    Bedside shift change report given to Ruslan Patel (oncoming nurse) by Sandoval Nunez RN (offgoing nurse). Report included the following information SBAR, Kardex, and MAR    Shift worked:  7 am to 7:30 pm     Shift summary and any significant changes:     Patient oriented, yet drowsy during shift. Patient on BiPap sating at 97%; however, was having episodes of shortness of breath with some anxiety. 2 doses of PRN IV Dilaudid and 2 doses of PRN IV Ativan administered, see MAR. Patient to be weaned off BiPap tomorrow; new orders for 1-2 mg PRN Dilaudid and 2 mg PRN Ativan placed per hospice for when this occurs. Patient drank several cups of ice water for dry mouth. Patient denied pain during shift. Repositioning and incontinence care completed during shift. Applied lotion to patient's rash on chest, neck and face. IV's flushed and patent; PICC line with positive blood return. Family came and visited patient during shift. Frequent rounds and education completed.       Concerns for physician to address:       Zone phone for oncoming shift:            Sandoval Nunez RN

## 2023-02-13 NOTE — DISCHARGE SUMMARY
Hospice Discharge Summary    [facility]  Good Help to Those in Need        Date of Admission: 2/10/2023  Date of Discharge: 2/13/2023    Annabella Valladares is a 60y.o. year old who was admitted to [facility] at [facility] with a Hospice diagnosis of Respiratory failure ([facility] [J96.90]. The patient's care was focused on comfort and the patient passed away on 2/13/2023. Rosie Pierson NP

## 2023-02-15 NOTE — HOSPICE
[address] Bereavement/Condolence Call: This LMSW called pt's daughter Arthur to offer condolences and support. Arthur reported she and family are doing okay. LMSW offered [facility] information for ongoing support.        Rene Gonzalez, [facility]    [phone]

## 2023-07-17 NOTE — PROGRESS NOTES
Problem: Pressure Injury - Risk of  Goal: *Prevention of pressure injury  Description: Document Gaston Scale and appropriate interventions in the flowsheet. Outcome: Progressing Towards Goal  Note: Pressure Injury Interventions:  Sensory Interventions: Assess changes in LOC,Assess need for specialty bed    Moisture Interventions: Absorbent underpads,Apply protective barrier, creams and emollients    Activity Interventions: Assess need for specialty bed,Chair cushion,PT/OT evaluation    Mobility Interventions: Assess need for specialty bed,Chair cushion    Nutrition Interventions: Document food/fluid/supplement intake    Friction and Shear Interventions: Apply protective barrier, creams and emollients                Problem: Pressure Injury - Risk of  Goal: *Prevention of pressure injury  Description: Document Gaston Scale and appropriate interventions in the flowsheet. Outcome: Progressing Towards Goal  Note: Pressure Injury Interventions:  Sensory Interventions: Assess changes in LOC,Assess need for specialty bed    Moisture Interventions: Absorbent underpads,Apply protective barrier, creams and emollients    Activity Interventions: Assess need for specialty bed,Chair cushion,PT/OT evaluation    Mobility Interventions: Assess need for specialty bed,Chair cushion    Nutrition Interventions: Document food/fluid/supplement intake    Friction and Shear Interventions: Apply protective barrier, creams and emollients                Problem: Falls - Risk of  Goal: *Absence of Falls  Description: Document Anna Fall Risk and appropriate interventions in the flowsheet.   Outcome: Progressing Towards Goal  Note: Fall Risk Interventions:            Medication Interventions: Assess postural VS orthostatic hypotension,Bed/chair exit alarm    Elimination Interventions: Bed/chair exit alarm,Call light in reach n/a

## 2024-03-19 NOTE — PROGRESS NOTES
1:38  I will cancel ambulance due to cardiac cath today. Dr Ben Brown on 11/2 wrote no DOAC secondary to cost.  Her PCP sheridan't has been made. 2nd IM has been completed over the phone with the dtr. Discussed with MD who will discuss options with Dr Ben Brown. 1245  D/C plans discussed in IDR. Will dc to home via 4pm [facility] ambulance if all in agreement. Please send emar, d/c instructions, facesheet//[facility] form(on clipboard). Thanks      1030  I called and spoke with dtr on the phone. She verified demographics; states the pt would need ambulance transport at d/c; if discharged today, that would be fine; pt lives with herself/Kianna's dtr, and pt's sister; uses [employer]; oxygen is thru [place]; [facility] has closed the case; and hasn't seen PCP in 2020. I'll ask CM Specialist to arrange for a PCP followup.       Transition of Care Plan:                    -need to clarify most recent PCP sheridan't-done  -need to contact dtr-done  -need to clarify name of oxygen company-[employer]  -may benefit from Palliative Care consult-not at present time per MD. No

## 2024-08-16 NOTE — PROGRESS NOTES
Hospitalist Progress Note    NAME: Konstantin Reese   :  1960   MRN:  [id number]     Admit date: 2020    Today's date: 20    PCP: MOISE Gu M.D. Cell [phone]    Assessment / Plan:  Acute respiratory failure with hypoxia and hypercarbia POA  Probable acute on chronic diastolic CHF POA LVEF 36-38%  ? Acute bronchitis POA   Underlying JOSLYN or OHS POA with Body mass index is 58.58 kg/m². Metabolic encephalopathy POA due to hypercarbia   COVID-19 rule out POA testing negative x 2  Baseline does not wear home o2, admits to chronic CHOPRA  Past few days worsening CHOPRA and SOB at rest  Positive LE edema and orthopnea  No fevers, positive dry cough  HCO3 elevated at admit 36, suspect some chronic CO2 retention at baseline  CXR with bilateral interstitial edema   pBNP 2,987  procalcitonin < 0.05  Differential; Acute left CHF, atypical pneumonia              Bacterial pneumonia unlikely               ?  All superimposed on underlying JOSLYN or OHS  SARS-CoV-2 test negative, repeat pending  More lethargic evening , transferred to ICU         ABG 7.17, pCO2 > 90, pO2 88 on 15 liters         BIPAP then intubated on   IV lasix, - 4000 for admit, HCO3 rising, now holding lasix  ASA, B-blocker  Serial cardiac enzymes negative for MI  No wheezing, held steroids and nebs  No SIRS criteria, procalcitonin < 0.05  SARS-CoV-2 testing negative x 2  Echo LVEF 50-55%, mild LVH, normal RV function  Pulm consult, failed SBT trial today, awake and interactive, following commands on vent  No clear pneumonia, pulm added antibiotics, sputum culture  Spoke with daughter by phone today    Thrombocytopenia  PLTs dropped from 210,000 to 120,000  Only after 2 doses of lovenox, unlikely POP  Recheck in AM  Normal coags, fibrinogen     Essential HTN POA  Hyperlipidemia POA  Holding metoprolol and lipitor with intubation, borderline low BP  PRN labetalol  LDL 55, TG 155     DM type 2 POA  Not currently on treatment  POC, SSI  HgBa1c 6.0%     Remote tobacco use quit 40 years ago     Impaired mobility POA due to DJD knees  Not walking much at home, limited by knee pain     Fungal rash in trunk skin folds POA  Topical nystatin    Morbid obesity POA Body mass index is 53.15 kg/m².     Given the patient's current clinical presentation, I have a high level of concern for decompensation if discharged from the emergency department. My assessment of this patient's clinical condition and my plan of care is as noted above.     DVT prophylaxis with lovenox     Code status: full code  NOK: daughter     Subjective:     Chief Complaint / Reason for Physician Visit f/u acute respiratory failure  Intubated, alert  tracking and following commands  Failed SBT again today  No other issues  Nodded no when asked if she is SOB or having pain    Review of Systems:  Symptom Y/N Comments  Symptom Y/N Comments   Fever/Chills    Chest Pain     Poor Appetite    Edema     Cough    Abdominal Pain     Sputum    Joint Pain     SOB/CHOPRA    Headache     Nausea/vomit    Tolerating PT/OT     Diarrhea    Tolerating Diet     Constipation    Other       Could NOT obtain due to: Intubated     Objective:     VITALS:   Last 24hrs VS reviewed since prior progress note.  Most recent are:  Patient Vitals for the past 24 hrs:   Temp Pulse Resp BP SpO2   07/27/20 1400  65 20 118/56 98 %   07/27/20 1300  65 20 126/63 95 %   07/27/20 1200 99.2 °F (37.3 °C) 70 18 98/56 95 %   07/27/20 1138  83 17  97 %   07/27/20 1100  65 12 101/46 97 %   07/27/20 1000  68 13 107/57 99 %   07/27/20 0900  71 24 106/55 97 %   07/27/20 0848    116/59    07/27/20 0819  73 16 116/59 96 %   07/27/20 0800 99.2 °F (37.3 °C) 68 11 (!) 91/18 98 %   07/27/20 0756  72 12  95 %   07/27/20 0744  67 13 (!) 88/45 95 %   07/27/20 0740  65 12 90/48 95 %   07/27/20 0737  64 18 (!) 76/33 95 %   07/27/20 0736  65 12  95 %   07/27/20 0700  66 12 (!) 81/39 95 %   07/27/20 0600  86 22 (!) 103/36 91 %   07/27/20 0500  63 12 (!) 84/39 95 %   07/27/20 0400  63 12 (!) 82/32 95 %   07/27/20 0324  64 12  95 %   07/27/20 0300  62 13 (!) 84/33 95 %   07/27/20 0200  63 12 101/52 95 %   07/27/20 0100  63 12 99/45 95 %   07/27/20 0000  63 12  95 %   07/26/20 2344  62 12  95 %   07/26/20 2300  64 12 92/51 95 %   07/26/20 2200  64 12 93/48 95 %   07/26/20 2100  64 12 90/43 95 %   07/26/20 2001  64 12  95 %   07/26/20 2000  63 12 102/48 95 %   07/26/20 1900  76 12 104/46 95 %   07/26/20 1800  63 12 98/47 96 %   07/26/20 1700  69 12 101/49 96 %   07/26/20 1600 99.1 °F (37.3 °C) 72 13 102/49 97 %       Intake/Output Summary (Last 24 hours) at 7/27/2020 1519  Last data filed at 7/27/2020 1400  Gross per 24 hour   Intake 2156.17 ml   Output 2060 ml   Net 96.17 ml        Wt Readings from Last 12 Encounters:   07/27/20 136.1 kg (300 lb 0.7 oz)   12/27/19 136.1 kg (300 lb)   01/30/19 136.1 kg (300 lb)   11/13/15 134.7 kg (297 lb)   03/17/15 140.1 kg (308 lb 13.8 oz)       PHYSICAL EXAM:  General: WD, WN. Intubated, alert  EENT:  PERRL. Anicteric sclerae. ETT in place  Resp:  Crackles at bases bilaterally, no wheezing. No accessory muscle use  CV:  Regular  rhythm,  1+ edema  GI:  Soft, Non distended, Non tender. +Bowel sounds, no rebound  LN:  No cervical or inguinal MONICA  Neurologic:  Intubated, alert and following commands  Psych:    Not anxious nor agitated  Skin:  No rashes.   No jaundice    Reviewed most current lab test results and cultures  YES  Reviewed most current radiology test results   YES  Review and summation of old records today    NO  Reviewed patient's current orders and MAR    YES  PMH/SH reviewed - no change compared to H&P  ________________________________________________________________________  Care Plan discussed with:    Comments   Patient x    Family  x Daughter by phone   RN x    Care Manager     Consultant  x Dr Garland Lafleur Multidiciplinary team rounds were held today with , nursing, pharmacist and clinical coordinator. Patient's plan of care was discussed; medications were reviewed and discharge planning was addressed. ________________________________________________________________________      Comments   >50% of visit spent in counseling and coordination of care     ________________________________________________________________________  Tara Carias MD     Procedures: see electronic medical records for all procedures/Xrays and details which were not copied into this note but were reviewed prior to creation of Plan. LABS:  I reviewed today's most current labs and imaging studies.   Pertinent labs include:  Recent Labs     07/27/20 0442 07/26/20  0534 07/25/20  0545   WBC 9.2 8.0 9.3   HGB 13.6 14.1 13.5   HCT 44.2 46.4 45.2   * 120* 142*     Recent Labs     07/27/20 0442 07/26/20  0534 07/25/20  0545    141 143   K 3.2* 3.3* 3.9   CL 93* 96* 100   CO2 41* 42* 38*   GLU 93 133* 83   BUN 25* 27* 27*   CREA 1.03* 1.10* 1.05*   CA 8.5 9.1 9.1   ALB 2.3* 2.5*  --    TBILI 2.2* 1.8*  --    ALT 16 17  --    INR  --  1.1  -- Detail Level: Zone Photo Preface (Leave Blank If You Do Not Want): Photographs were obtained today

## 2025-07-03 NOTE — PROGRESS NOTES
Palliative Medicine Consult  Miguel A: [phone] ([phone])    Patient Name: Ileana Goldman  YOB: 1960    Today's date:  1/31/23  Date of Initial Consult: 1/25/23  Reason for Consult: End Stage Disease  Requesting Provider: Goran Livingston MD   Primary Care Physician: Unknown, Provider     SUMMARY:   Ileana Goldman is a 60 y.o. with a past history of morbid obesity, DM2, CHF,  bed bound for 2 years, on 4 L of O2, COPD, CHF, morbid obesity, p. Afib, chronic pain, nonambulatory at baseline, JOSLYN compliatn with CPAP, h/o duodenal ulcers, who was admitted on 1/21/2023 from home with a diagnosis of symptomatic anemia. HPI:  pt was sent to ED by her PCP for low h/h. Pt reports feeling fatigued but denies CP, SOB, no other symptoms, no bleeding in stool or urine. Admission course: hgb was 5.2 on admission that improved to 7.0 following 1 unit prbcs, hypotensive. 1/22: underwent avulsion of toenail off right great toe. Current medical issues leading to Palliative Medicine involvement include: poor prognosis. 1/24: continues no bleeding and negative fecal occult blood test, ASA-4 very high risk, if she has cancer, not a candidate for aggressive measures. 1/25: RRT called this am for respiratory failure, on HFNC02 at 15 liters, due to C02>130, Pa02 70s, Sp02 91%, bipap started, transferred to ICU. Blood cultures positive for staph aureus, abx will be started today. 3 units prbcs given during this admission so far.  1/27: left femoral neck fracture, per ortho pt not a candidate for surgical interventions. 1/29: 07:31am: CODE called after transitioning from BiPAP to NC02, went into v-tach with quick, spontaneous recovery without intervention. 16:22pm:   CODE for no pulse/v-tach and unresponsive, CPR x 10 compressions upon which pt awoke and pushed RN off her. Pt changed her code status to DNR.  1/30: no further arrhythmias. Still weaning levophed. Receiving PRBCs. CT of pelvis:  1.   No CT evidence of acute osteomyelitis. 2.  Chronic nonunited left femoral neck fracture. Adjacent moderate hip joint osteoarthritis with large complex joint effusion. 3.  Severe right hip joint osteophytosis with small complex joint effusion. 4.  Small volume ascites. Anasarca. 1/31: prbcs for hgb 6.5.  developed rash overnight. Needs YANNA but high risk as she would need to be intubated, ongoing GIB not a candidate for invasive procedures (? capsule study) has required 2 transfusions of prbcs over the past 24 hours,  hip fracture (chronic), pending aspiration of left hip effusion. Psychosocial: bed bound for 3 years. Home 02 3-4L. Lives with dtr and requires assists with ADLs/IADLs   PALLIATIVE DIAGNOSES:   Acute on chronic Anemia, suspect UGIB (stool neg for occult blood), history of PUD on EGD June 2022,  occult positive  Hypotension due to MSSA bacteremia   Chronic hypoxic respiratory failure, COPD, JOSLYN on CPAP  Paroxysmal afib  Severe Diastolic HF  Edema   Wounds  Right great toenail falling off due to a cat scratch   Left hip pain: XR left hip: 1. Displaced, rotated, left femoral neck fracture. 2. Osteoporosis, new from 2014. 3. End-stage right hip osteoarthritis, new from 2014. .  V-fib in setting of profound hypokalemia  Care decisions     PLAN:   Prior to visit, I completed a review of patient's medical records, including medical documentation, vital signs, MARs, and results of various labs and other diagnostics. I also spoke with patient's nurse, Jamarcus Patient and pulmonologist/intensivist Dr. Laura Scherer. Met with pt: discussed her complicated health issues including ongoing GIB requiring transfusions and not able to fully w/u due to poor performance status and overall physical condition, MSSA infection, hip fracture with possible aspiration of hip, need for YANNA but also too high risk.   Pt states her ultimate goal is to return home and be with her dtr [facility], if we cannot treat and make her better she would rather go home with hospice. Pt shared that she enjoys playing electronic games and hanging out with dtr. Family meeting tomorrow at 10:00am to discuss goals of care. Initial consult note routed to primary continuity provider and/or primary health care team members  Communicated plan of care with: Palliative IDTMaribel 192 Team     GOALS OF CARE / TREATMENT PREFERENCES:     GOALS OF CARE:  Patient/Health Care Proxy Stated Goals: Prolong life    TREATMENT PREFERENCES:   Code Status: DNR      Advance Care Planning:  [x] [facility] Interdisciplinary Team has updated the ACP Navigator with Health Care Decision Maker and Patient Capacity      Primary Decision Maker: Shubham Vazquez - [phone]    Secondary Decision Maker: José Angela - Sister - [phone]  Advance Care Planning 1/25/2023   Patient's Healthcare Decision Maker is: Named in scanned ACP document   Confirm Advance Directive Yes, on file   Does the patient have other document types MOST/MOLST/POST/POLST       Medical Interventions: Full interventions       Other:    As far as possible, the palliative care team has discussed with patient / health care proxy about goals of care / treatment preferences for patient.      HISTORY:     History obtained from: chart     The patient is:   [] Verbal and participatory  [x] Non-participatory due to:   pt's condition     Clinical Pain Assessment (nonverbal scale for severity on nonverbal patients):   Clinical Pain Assessment  Severity: 0     Activity (Movement): Lying quietly, normal position    Duration: for how long has pt been experiencing pain (e.g., 2 days, 1 month, years)  Frequency: how often pain is an issue (e.g., several times per day, once every few days, constant)     FUNCTIONAL ASSESSMENT:     Palliative Performance Scale (PPS):  PPS: 20       PSYCHOSOCIAL/SPIRITUAL SCREENING:     Palliative IDT has assessed this patient for cultural preferences / practices and a referral made as appropriate to needs (Cultural Services, Patient Advocacy, Ethics, etc.)    Any spiritual / Uatsdin concerns:  [] Yes /  [x] No   If \"Yes\" to discuss with pastoral care during IDT     Does caregiver feel burdened by caring for their loved one:   [] Yes /  [x] No /  [] No Caregiver Present    If \"Yes\" to discuss with social work during IDT    Anticipatory grief assessment:   [x] Normal  / [] Maladaptive     If \"Maladaptive\" to discuss with social work during IDT    ESAS Anxiety: Anxiety: 0    ESAS Depression: Depression: 0         REVIEW OF SYSTEMS:     Positive and pertinent negative findings in ROS are noted above in HPI. The following systems were [x] reviewed / [] unable to be reviewed as noted in HPI  Other findings are noted below. Systems: constitutional, ears/nose/mouth/throat, respiratory, gastrointestinal, genitourinary, musculoskeletal, integumentary, neurologic, psychiatric, endocrine. Positive findings noted below. Modified ESAS Completed by: provider   Fatigue: 5 Drowsiness: 0   Depression: 0 Pain: 0   Anxiety: 0 Nausea: 0   Anorexia: 0 Dyspnea: 0           Stool Occurrence(s): 1        PHYSICAL EXAM:     From RN flowsheet:  Wt Readings from Last 3 Encounters:   01/30/23 291 lb 0.1 oz (132 kg)   06/15/22 301 lb 9.6 oz (136.8 kg)   05/16/22 298 lb 11.6 oz (135.5 kg)     Blood pressure (!) 107/46, pulse 84, temperature 97.7 °F (36.5 °C), resp. rate (!) 31, height 5' 3\" (1.6 m), weight 291 lb 0.1 oz (132 kg), SpO2 97 %, not currently breastfeeding.     Pain Scale 1: Numeric (0 - 10)  Pain Intensity 1: 0  Pain Onset 1: Chronic  Pain Location 1: Back, Hip  Pain Orientation 1: Left, Right  Pain Description 1: Aching  Pain Intervention(s) 1: Medication (see MAR)  Last bowel movement, if known:     Constitutional: aao x3, NAD  Eyes: pupils equal, anicteric  ENMT: no nasal discharge, dry mucous membranes   Cardiovascular: regular rhythm, distal pulses intact  Respiratory: breathing not labored, symmetric  Gastrointestinal: soft non-tender, +bowel sounds  Musculoskeletal: no deformity, no tenderness to palpation  Skin: warm, dry, dressing around right forefoot  Neurologic: following  commands, moving all extremities  Psychiatric: full affect       HISTORY:     Active Problems:    Symptomatic anemia (1/22/2023)    Past Medical History:   Diagnosis Date    Acute on chronic respiratory failure with hypoxia and hypercapnia (HCC) 11/20/2020    Atrial fibrillation ([facility] 11/4/2020    CHF (congestive heart failure) ([facility]     Diabetes ([facility]     Hypertension     Ill-defined condition     high cholesterol    Ill-defined condition     tachycardia    JOSLYN (obstructive sleep apnea) 11/20/2020    Other and unspecified symptoms and signs involving general sensations and perceptions     ruptured disc    S/P cardiac cath 11/4/2020    11/3/2020 nonobstructive disease      Past Surgical History:   Procedure Laterality Date    IL COMPRE ELECTROPHYSIOL XM W/LEFT ATRIAL PACNG/REC N/A 11/23/2020    Lt Atrial Pace & Record During Ep Study performed by Marce Ordonez MD at [facility] CARDIAC CATH LAB    IL COMPRE EP EVAL ABLTJ 3D MAPG TX SVT N/A 11/23/2020    ABLATION A-FLUTTER performed by Marce Ordonez MD at [facility] ADD ON N/A 11/23/2020    Ablation Svt/Vt Add On performed by Marce Ordonez MD at [facility] CARDIAC CATH LAB    IL INTRACARDIAC ELECTROPHYSIOLOGIC 3D MAPPING N/A 11/23/2020    Ep 3d Mapping performed by Marce Ordonez MD at [facility] CARDIAC CATH LAB    IL STIM/PACING HEART POST IV DRUG INFU N/A 11/23/2020    Drug Stimulation performed by Marce Ordonez MD at [facility] CARDIAC CATH LAB      No family history on file. History reviewed, no pertinent family history.   Social History     Tobacco Use    Smoking status: Former    Smokeless tobacco: Never   Substance Use Topics    Alcohol use: No     Allergies   Allergen Reactions    Cefepime Rash     tolerated nafcillin 1/2023    Cefazolin Rash    Other Plant, Animal, Environmental Rash     Ivory soap caused rash on hands.     Zosyn [Piperacillin-Tazobactam] Rash     Possible rash to zosyn, also received IV contrast and blood  Tolerated nafcillin before/after 1/2023      Current Facility-Administered Medications   Medication Dose Route Frequency    0.9% sodium chloride infusion 250 mL  250 mL IntraVENous PRN    acetaminophen (TYLENOL) solution 650 mg  650 mg Oral ONCE    potassium bicarb-citric acid (EFFER-K) tablet 20 mEq  20 mEq Oral DAILY    nafcillin (NALLPEN) 2 g in 0.9% sodium chloride (MBP/ADV) 100 mL MBP  2 g IntraVENous F7V    folic acid (FOLVITE) 1 mg in 0.9% sodium chloride 50 mL ivpb   IntraVENous DAILY    diphenhydrAMINE (BENADRYL) capsule 25 mg  25 mg Oral Q6H    0.9% sodium chloride infusion 250 mL  250 mL IntraVENous PRN    midodrine (PROAMATINE) tablet 10 mg  10 mg Oral TID WITH MEALS    [Held by provider] enoxaparin (LOVENOX) injection 30 mg  30 mg SubCUTAneous Q12H    alcohol 62% (NOZIN) nasal  1 Ampule  1 Ampule Topical Q12H    insulin lispro (HUMALOG) injection   SubCUTAneous Q6H    glucose chewable tablet 16 g  4 Tablet Oral PRN    glucagon (GLUCAGEN) injection 1 mg  1 mg IntraMUSCular PRN    [Held by provider] metoprolol tartrate (LOPRESSOR) tablet 12.5 mg  12.5 mg Oral BID    albuterol-ipratropium (DUO-NEB) 2.5 MG-0.5 MG/3 ML  3 mL Nebulization Q4H PRN    atorvastatin (LIPITOR) tablet 10 mg  10 mg Oral QHS    pantoprazole (PROTONIX) tablet 40 mg  40 mg Oral ACB&D    sodium chloride (NS) flush 5-40 mL  5-40 mL IntraVENous Q8H    sodium chloride (NS) flush 5-40 mL  5-40 mL IntraVENous PRN    acetaminophen (TYLENOL) tablet 650 mg  650 mg Oral Q6H PRN    Or    acetaminophen (TYLENOL) suppository 650 mg  650 mg Rectal Q6H PRN    polyethylene glycol (MIRALAX) packet 17 g  17 g Oral DAILY PRN    ondansetron (ZOFRAN ODT) tablet 4 mg  4 mg Oral Q8H PRN    Or    ondansetron (ZOFRAN) injection 4 mg  4 mg IntraVENous Q6H PRN    dextrose 10% infusion 0-250 mL  0-250 mL IntraVENous PRN    balsam peru-castor oiL (VENELEX) ointment   Topical BID          LAB AND IMAGING FINDINGS:     Lab Results   Component Value Date/Time    WBC 11.1 (H) 01/31/2023 10:42 AM    HGB 7.5 (L) 01/31/2023 10:42 AM    PLATELET 820 (L) 49/70/4791 10:42 AM     Lab Results   Component Value Date/Time    Sodium 138 01/31/2023 02:54 AM    Potassium 3.9 01/31/2023 02:54 AM    Chloride 98 01/31/2023 02:54 AM    CO2 35 (H) 01/31/2023 02:54 AM    BUN 16 01/31/2023 02:54 AM    Creatinine 0.89 01/31/2023 02:54 AM    Calcium 7.6 (L) 01/31/2023 02:54 AM    Magnesium 2.4 01/31/2023 02:54 AM    Phosphorus 1.8 (L) 01/31/2023 02:54 AM      Lab Results   Component Value Date/Time    Alk. phosphatase 84 01/29/2023 03:32 AM    Protein, total 6.5 01/29/2023 03:32 AM    Albumin 2.1 (L) 01/29/2023 03:32 AM    Globulin 4.4 (H) 01/29/2023 03:32 AM     Lab Results   Component Value Date/Time    INR 1.4 (H) 11/24/2020 02:59 AM    Prothrombin time 14.6 (H) 11/24/2020 02:59 AM    aPTT 29.6 06/08/2022 06:15 AM      Lab Results   Component Value Date/Time    Iron 31 (L) 01/23/2023 02:09 PM    TIBC 328 01/23/2023 02:09 PM    Iron % saturation 9 (L) 01/23/2023 02:09 PM    Ferritin 32 01/23/2023 02:09 PM      Lab Results   Component Value Date/Time    pH 7.27 (L) 01/25/2023 12:07 PM    PCO2 104 (H) 01/25/2023 12:07 PM    PO2 68 (L) 01/25/2023 12:07 PM     No components found for: [place]   Lab Results   Component Value Date/Time    CK 8 (L) 06/17/2022 05:28 AM    CK - MB <1.0 11/01/2020 04:33 AM                Total time: 35  Counseling / coordination time, spent as noted above: 25  > 50% counseling / coordination?: y    Prolonged service was provided for  []30 min   []75 min in face to face time in the presence of the patient, spent as noted above. Time Start:   Time End:   Note: this can only be billed with 69700 (initial) or 04471 (follow up).   If multiple start / stop Adult times, list each separately.

## (undated) DEVICE — 1200 GUARD II KIT W/5MM TUBE W/O VAC TUBE: Brand: GUARDIAN

## (undated) DEVICE — REM POLYHESIVE ADULT PATIENT RETURN ELECTRODE: Brand: VALLEYLAB

## (undated) DEVICE — SYRINGE MED 10ML LUERLOCK TIP W/O SFTY DISP

## (undated) DEVICE — GUIDEWIRE VASC L260CM 0.035IN J TIP L3MM PTFE FIX COR NAMIC

## (undated) DEVICE — CATH NAVISTAR BIDIR FJ 8MM --

## (undated) DEVICE — PINNACLE INTRODUCER SHEATH: Brand: PINNACLE

## (undated) DEVICE — PACK PROCEDURE SURG HRT CATH

## (undated) DEVICE — CONTAINER SPEC 20 ML LID NEUT BUFF FORMALIN 10 % POLYPR STS

## (undated) DEVICE — BITE BLK ENDOSCP AD 54FR GRN POLYETH ENDOSCP W STRP SLD

## (undated) DEVICE — TR BAND RADIAL ARTERY COMPRESSION DEVICE: Brand: TR BAND

## (undated) DEVICE — SOLIDIFIER FLD 2OZ 1500CC N DISINF IN BTL DISP SAFESORB

## (undated) DEVICE — CATH EP CRV 7F DUO 2/8 2M LG -- LIVEWIRE STRL

## (undated) DEVICE — CATH RMFG DECA DUO 7F2/8 95S --

## (undated) DEVICE — Device

## (undated) DEVICE — CABLE CATH L10FT RED PIN CONN 25-34 FOR NAVISTAR CARTO 3

## (undated) DEVICE — KIT,1200CC CANISTER,3/16"X6' TUBING: Brand: MEDLINE INDUSTRIES, INC.

## (undated) DEVICE — SYR ART 700 CLEAR MARK 7 -- ARTERION

## (undated) DEVICE — MEDI-TRACE CADENCE ADULT, DEFIBRILLATION ELECTRODE -RTS  (10 PR/PK) - PHILIPS: Brand: MEDI-TRACE CADENCE

## (undated) DEVICE — CATH IV AUTOGRD BC PNK 20GA 25 -- INSYTE

## (undated) DEVICE — TOWEL 4 PLY TISS 19X30 SUE WHT

## (undated) DEVICE — Z DISCONTINUED PER MEDLINE LINE GAS SAMPLING O2/CO2 LNG AD 13 FT NSL W/ TBNG FILTERLINE

## (undated) DEVICE — BAG SPEC BIOHZRD 10 X 10 IN --

## (undated) DEVICE — YANKAUER,TAPERED BULBOUS TIP,W/O VENT: Brand: MEDLINE

## (undated) DEVICE — RADIFOCUS OPTITORQUE ANGIOGRAPHIC CATHETER: Brand: OPTITORQUE

## (undated) DEVICE — SYR 3ML LL TIP 1/10ML GRAD --

## (undated) DEVICE — ELECTRODE,RADIOTRANSLUCENT,FOAM,5PK: Brand: MEDLINE

## (undated) DEVICE — BLOCK BITE ENDOSCP AD 21 MM W/ DIL BLU LF DISP

## (undated) DEVICE — CATH 4MM NAVIGATIONAL BI-DIREC --

## (undated) DEVICE — GLIDESHEATH SLENDER ACCESS KIT: Brand: GLIDESHEATH SLENDER

## (undated) DEVICE — 3M™ TEGADERM™ TRANSPARENT FILM DRESSING FRAME STYLE, 1626W, 4 IN X 4-3/4 IN (10 CM X 12 CM), 50/CT 4CT/CASE: Brand: 3M™ TEGADERM™

## (undated) DEVICE — HYPODERMIC SAFETY NEEDLE: Brand: MAGELLAN

## (undated) DEVICE — CATHETER ETER ANGIO L110CM OD5FR ID046IN L75CM 038IN 145DEG CARD

## (undated) DEVICE — SET ADMIN 16ML TBNG L100IN 2 Y INJ SITE IV PIGGY BK DISP (ORDER IN MULIPLES OF 48)

## (undated) DEVICE — BASIN EMSIS 16OZ GRAPHITE PLAS KID SHP MOLD GRAD FOR ORAL

## (undated) DEVICE — DRESSING HEMOSTATIC SFT INTVENT W/O SLT DBL WRP QUIKCLOT LF

## (undated) DEVICE — CABLE EXT EP H/O/D BLK 150CM --

## (undated) DEVICE — PATCH CARTO 3 EXT REF --

## (undated) DEVICE — TUBING PRSS MON L6IN PVC M FEM CONN

## (undated) DEVICE — INTRODUCER SHTH J 0.038 IN 3 MM 8 FRX60 CM DIL FAST-CATH

## (undated) DEVICE — ANGIOGRAPHY KIT CUST [K0910930B] [MERIT MEDICAL SYSTEMS INC]

## (undated) DEVICE — ESOPHAGEAL BALLOON DILATATION CATHETER: Brand: CRE FIXED WIRE

## (undated) DEVICE — FORCEPS BX L160CM DIA8MM GRSP DISECT CUP TIP NONLOCKING ROT

## (undated) DEVICE — SPLINT WR POS F/ARTERIAL ACC -- BX/10

## (undated) DEVICE — NEONATAL-ADULT SPO2 SENSOR: Brand: NELLCOR